# Patient Record
Sex: MALE | Race: WHITE | Employment: OTHER | URBAN - METROPOLITAN AREA
[De-identification: names, ages, dates, MRNs, and addresses within clinical notes are randomized per-mention and may not be internally consistent; named-entity substitution may affect disease eponyms.]

---

## 2017-01-02 ENCOUNTER — TELEPHONE (OUTPATIENT)
Dept: FAMILY MEDICINE | Facility: CLINIC | Age: 57
End: 2017-01-02

## 2017-01-02 ENCOUNTER — ALLIED HEALTH/NURSE VISIT (OUTPATIENT)
Dept: FAMILY MEDICINE | Facility: CLINIC | Age: 57
End: 2017-01-02
Payer: COMMERCIAL

## 2017-01-02 VITALS — DIASTOLIC BLOOD PRESSURE: 108 MMHG | SYSTOLIC BLOOD PRESSURE: 172 MMHG

## 2017-01-02 DIAGNOSIS — I10 HYPERTENSION GOAL BP (BLOOD PRESSURE) < 140/90: Primary | ICD-10-CM

## 2017-01-02 DIAGNOSIS — I10 ESSENTIAL HYPERTENSION WITH GOAL BLOOD PRESSURE LESS THAN 140/90: Primary | ICD-10-CM

## 2017-01-02 PROCEDURE — 99207 ZZC NO CHARGE NURSE ONLY: CPT | Performed by: FAMILY MEDICINE

## 2017-01-02 RX ORDER — LISINOPRIL 20 MG/1
40 TABLET ORAL DAILY
Qty: 60 TABLET | Refills: 0 | Status: SHIPPED | OUTPATIENT
Start: 2017-01-02 | End: 2017-01-09

## 2017-01-02 NOTE — TELEPHONE ENCOUNTER
Pt was told from the pharmacy he needed to be worked in today     Huddled with MD ANNA - bp 182/108 and he needs to double the lisinopril and be seen next Monday   BP Readings from Last 3 Encounters:   02/26/16 164/90   10/02/15 158/86   10/01/15 162/98       Advised pt on the above     Patient stated an understanding and agreed with plan.    Joanna Romero RN, BSN  [facility]

## 2017-01-02 NOTE — PROGRESS NOTES
"Estevan Aaron is enrolled/participating in the retail pharmacy Blood Pressure Goals Achievement Program (BPGAP).  Estevan Aaron was evaluated at [facility] on January 2, 2017 at which time his blood pressure was:    BP Readings from Last 3 Encounters:   01/02/17 172/108   02/26/16 164/90   10/02/15 158/86     Reviewed lifestyle modifications for blood pressure control and reduction: including making healthy food choices, managing weight, getting regular exercise, smoking cessation, reducing alcohol consumption, monitoring blood pressure regularly.     Estevan Aaron is not experiencing symptoms.    Follow-Up: BP is not at goal of < 140/90mmHg (patient 18+ years of age with or without diabetes), Recommended follow-up with PCP.  Routing to PCP for further review.    Completed by: Thank you,  Laila Steel [facility], Mgr [facility] [place] [phone]    NOTE: pt had been sitting x10 min.  Consistent with previous bp checks.  Pt reluctant to have office visit because he reports \"feeling fine\"  Also reports quite a bit of caffeine use and nicotine this morning.    RN increased lisinopril dose while pt was here and scheduled appt for 1/9/17     "

## 2017-01-06 ENCOUNTER — ALLIED HEALTH/NURSE VISIT (OUTPATIENT)
Dept: FAMILY MEDICINE | Facility: CLINIC | Age: 57
End: 2017-01-06
Payer: COMMERCIAL

## 2017-01-06 VITALS — DIASTOLIC BLOOD PRESSURE: 88 MMHG | SYSTOLIC BLOOD PRESSURE: 158 MMHG

## 2017-01-06 DIAGNOSIS — I10 ESSENTIAL HYPERTENSION WITH GOAL BLOOD PRESSURE LESS THAN 140/90: Primary | ICD-10-CM

## 2017-01-06 PROCEDURE — 99207 ZZC NO CHARGE NURSE ONLY: CPT | Performed by: FAMILY MEDICINE

## 2017-01-06 NOTE — PROGRESS NOTES
Estevan Aaron is enrolled/participating in the retail pharmacy Blood Pressure Goals Achievement Program (BPGAP).  Estevan Aaron was evaluated at [facility] on January 6, 2017 at which time his blood pressure was:    BP Readings from Last 3 Encounters:   01/06/17 158/88   01/02/17 172/108   02/26/16 164/90     Reviewed lifestyle modifications for blood pressure control and reduction: including making healthy food choices, managing weight, getting regular exercise, smoking cessation, reducing alcohol consumption, monitoring blood pressure regularly.     Estevan Aaron is not  experiencing symtoms: ()    Follow-Up: BP is not at goal of < 140/90mmHg (patient 18+ years of age with or without diabetes), Recommended follow-up with PCP.  Routing to PCP for further review.    Completed by: Thank you,  Laila Steel [facility], [facility] [place] [phone]    Pt has appt to be seen for htn 1/9/17.  NOTE: lisinopril was doubled (to 40 mg) daily about 5 days ago.  BP has come down but still not near goal.  Thank you,  Laila Steel [facility], [facility] [place] [phone]

## 2017-01-09 ENCOUNTER — OFFICE VISIT (OUTPATIENT)
Dept: FAMILY MEDICINE | Facility: CLINIC | Age: 57
End: 2017-01-09
Payer: COMMERCIAL

## 2017-01-09 VITALS
DIASTOLIC BLOOD PRESSURE: 86 MMHG | WEIGHT: 224 LBS | HEIGHT: 68 IN | HEART RATE: 105 BPM | SYSTOLIC BLOOD PRESSURE: 138 MMHG | OXYGEN SATURATION: 97 % | BODY MASS INDEX: 33.95 KG/M2 | TEMPERATURE: 98.1 F

## 2017-01-09 DIAGNOSIS — F17.200 TOBACCO USE DISORDER: ICD-10-CM

## 2017-01-09 DIAGNOSIS — I10 ESSENTIAL HYPERTENSION WITH GOAL BLOOD PRESSURE LESS THAN 140/90: Primary | ICD-10-CM

## 2017-01-09 DIAGNOSIS — Z12.5 SPECIAL SCREENING FOR MALIGNANT NEOPLASM OF PROSTATE: ICD-10-CM

## 2017-01-09 DIAGNOSIS — Z13.220 LIPID SCREENING: ICD-10-CM

## 2017-01-09 DIAGNOSIS — Z11.59 NEED FOR HEPATITIS C SCREENING TEST: ICD-10-CM

## 2017-01-09 DIAGNOSIS — E83.52 SERUM CALCIUM ELEVATED: ICD-10-CM

## 2017-01-09 PROCEDURE — 82043 UR ALBUMIN QUANTITATIVE: CPT | Performed by: PHYSICIAN ASSISTANT

## 2017-01-09 PROCEDURE — 99214 OFFICE O/P EST MOD 30 MIN: CPT | Performed by: PHYSICIAN ASSISTANT

## 2017-01-09 RX ORDER — TRIAMTERENE AND HYDROCHLOROTHIAZIDE 37.5; 25 MG/1; MG/1
1 CAPSULE ORAL DAILY
Qty: 90 CAPSULE | Refills: 0 | Status: SHIPPED | OUTPATIENT
Start: 2017-01-09 | End: 2017-01-17

## 2017-01-09 RX ORDER — LISINOPRIL 20 MG/1
20 TABLET ORAL DAILY
Qty: 90 TABLET | Refills: 0 | Status: SHIPPED | OUTPATIENT
Start: 2017-01-09 | End: 2017-01-17

## 2017-01-09 NOTE — NURSING NOTE
"Chief Complaint   Patient presents with     Recheck Medication     lisinopril        Initial /88 mmHg  Pulse 105  Temp(Src) 98.1  F (36.7  C) (Tympanic)  Ht 5' 8\" (1.727 m)  Wt 224 lb (101.606 kg)  BMI 34.07 kg/m2  SpO2 97% Estimated body mass index is 34.07 kg/(m^2) as calculated from the following:    Height as of this encounter: 5' 8\" (1.727 m).    Weight as of this encounter: 224 lb (101.606 kg).  BP completed using cuff size: opal Calle CMA      "

## 2017-01-09 NOTE — MR AVS SNAPSHOT
After Visit Summary   1/9/2017    Estevan Aaron    MRN: [id number]           Patient Information     Date Of Birth          1960        Visit Information        Provider Department      1/9/2017 5:20 PM Flora Gordillo PA-C [facility]        Today's Diagnoses     Essential hypertension with goal blood pressure less than 140/90    -  1     Serum calcium elevated         Special screening for malignant neoplasm of prostate         Need for hepatitis C screening test         Lipid screening         Tobacco use disorder           Care Instructions    Pharmacy BP follow up in 1 week.    Lab only appointment (fasting) in 4 weeks.        Follow-ups after your visit        Future tests that were ordered for you today     Open Future Orders        Priority Expected Expires Ordered    Basic metabolic panel Routine 2/8/2017 3/10/2017 1/9/2017    Lipid panel reflex to direct LDL Routine 2/8/2017 3/10/2017 1/9/2017            Who to contact     If you have questions or need follow up information about today's clinic visit or your schedule please contact [facility] directly at [phone].  Normal or non-critical lab and imaging results will be communicated to you by B[employer] P[employer] Gameshart, letter or phone within 4 business days after the clinic has received the results. If you do not hear from us within 7 days, please contact the clinic through [employer] or phone. If you have a critical or abnormal lab result, we will notify you by phone as soon as possible.  Submit refill requests through [employer] or call your pharmacy and they will forward the refill request to us. Please allow 3 business days for your refill to be completed.          Additional Information About Your Visit        [employer] P[employer] Information     [employer] lets you send messages to your doctor, view your test results, renew your prescriptions, schedule appointments and more. To sign up, go to [email or web address] . Click on  "\"Log in\" on the left side of the screen, which will take you to the Welcome page. Then click on \"Sign up Now\" on the right side of the page.     You will be asked to enter the access code listed below, as well as some personal information. Please follow the directions to create your username and password.     Your access code is: [email or web address]  Expires: 2017  5:55 PM     Your access code will  in 90 days. If you need help or a new code, please call your [facility] clinic or [phone].        Care EveryWhere ID     This is your Care EveryWhere ID. This could be used by other organizations to access your [facility] medical records  [id number]        Your Vitals Were     Pulse Temperature Height BMI (Body Mass Index) Pulse Oximetry       105 98.1  F (36.7  C) (Tympanic) 5' 8\" (1.727 m) 34.07 kg/m2 97%        Blood Pressure from Last 3 Encounters:   17 140/88   17 158/88   17 172/108    Weight from Last 3 Encounters:   17 224 lb (101.606 kg)   16 225 lb 3.2 oz (102.15 kg)   10/02/15 225 lb (102.059 kg)              We Performed the Following     Albumin Random Urine Quantitative     Calcium ionized     Comprehensive metabolic panel (BMP + Alb, Alk Phos, ALT, AST, Total. Bili, TP)     Hepatitis C Screen Reflex to HCV RNA Quant and Genotype     Parathyroid Hormone Intact     PSA, screen     Vitamin D Deficiency          Today's Medication Changes          These changes are accurate as of: 17  5:56 PM.  If you have any questions, ask your nurse or doctor.               Start taking these medicines.        Dose/Directions    triamterene-hydrochlorothiazide 37.5-25 MG per capsule   Commonly known as:  DYAZIDE   Used for:  Essential hypertension with goal blood pressure less than 140/90   Started by:  Flora Gordillo PA-C        Dose:  1 capsule   Take 1 capsule by mouth daily   Quantity:  90 capsule   Refills:  0         These medicines have changed or have updated " prescriptions.        Dose/Directions    lisinopril 20 MG tablet   Commonly known as:  PRINIVIL/ZESTRIL   This may have changed:  how much to take   Changed by:  Flora Gordillo PA-C        Dose:  20 mg   Take 1 tablet (20 mg) by mouth daily   Quantity:  90 tablet   Refills:  0            Where to get your medicines      These medications were sent to [facility] [address] [facility]  [address] [place] [address], [facility] [address]     Phone:  [phone]    - lisinopril 20 MG tablet  - triamterene-hydrochlorothiazide 37.5-25 MG per capsule             Primary Care Provider Office Phone # Fax #    Estevan Blair [phone]       [address] [address]        Thank you!     Thank you for choosing [facility]  for your care. Our goal is always to provide you with excellent care. Hearing back from our patients is one way we can continue to improve our services. Please take a few minutes to complete the written survey that you may receive in the mail after your visit with us. Thank you!             Your Updated Medication List - Protect others around you: Learn how to safely use, store and throw away your medicines at www.disposemymeds.org.          This list is accurate as of: 1/9/17  5:56 PM.  Always use your most recent med list.                   Brand Name Dispense Instructions for use    lisinopril 20 MG tablet    PRINIVIL/ZESTRIL    90 tablet    Take 1 tablet (20 mg) by mouth daily       triamterene-hydrochlorothiazide 37.5-25 MG per capsule    DYAZIDE    90 capsule    Take 1 capsule by mouth daily

## 2017-01-09 NOTE — PROGRESS NOTES
SUBJECTIVE:                                                    Estevan Aaron is a 56 year old male who presents to clinic today for the following health issues:    Hypertension Follow-up  Patient presents to clinic today for hypertension and medication follow up. He reports that he is aware he has high BP and attributes high BP to various life stressors. 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 He is interested in other medication options if it will help better control his BP. Currently taking 20 mg lisinopril daily as prescribed. Denies side effects from medication.   Family history in paternal grandfather and father significant for hypertension.     Outpatient blood pressures are being checked at home and pharmacy.  Results are variable.    Low Salt Diet: not monitoring salt  BP Readings from Last 6 Encounters:   01/09/17 140/88   01/06/17 158/88   01/02/17 172/108   02/26/16 164/90   10/02/15 158/86   10/01/15 162/98        Of mention, patient has PMH significant for elevated calcium levels. He has never followed up on his calcium labs. He admits to history significant for kidney stone which he was able to pass on his own.      Problem list and histories reviewed & adjusted, as indicated.  Additional history: as documented    Patient Active Problem List   Diagnosis     Tobacco use disorder     CARDIOVASCULAR SCREENING; LDL GOAL LESS THAN 160     Headache     Essential hypertension with goal blood pressure less than 140/90     Past Surgical History   Procedure Laterality Date     No history of surgery         Social History   Substance Use Topics     Smoking status: Current Every Day Smoker     Smokeless tobacco: Never Used      Comment: has cut down a lot - is very interested in [employer] quit program- referral given 4/19/06     Alcohol Use: No     Family History   Problem Relation Age of Onset     Prostate  "Cancer Father       age 59 cancer prostate, liver     CANCER Maternal Grandmother       of cancer     CEREBROVASCULAR DISEASE Maternal Grandfather      Hypertension Father          Current Outpatient Prescriptions   Medication Sig Dispense Refill     lisinopril (PRINIVIL/ZESTRIL) 20 MG tablet Take 2 tablets (40 mg) by mouth daily 60 tablet 0     No Known Allergies    ROS:  Constitutional, HEENT, cardiovascular, pulmonary, GI, , musculoskeletal, neuro, skin, endocrine and psych systems are negative, except as otherwise noted.    This document serves as a record of the services and decisions personally performed and made by Flora Gordillo PA-C. It was created on her behalf by Tonia Rubi, a trained medical scribe. The creation of this document is based the provider's statements to the medical scribe.  Tonia Rubi, 2017 5:42 PM    OBJECTIVE:                                                    /86 mmHg  Pulse 105  Temp(Src) 98.1  F (36.7  C) (Tympanic)  Ht 5' 8\" (1.727 m)  Wt 224 lb (101.606 kg)  BMI 34.07 kg/m2  SpO2 97%  Body mass index is 34.07 kg/(m^2).     GENERAL: healthy, alert and no distress  RESP: lungs clear to auscultation - no rales, rhonchi or wheezes  CV: regular rate and rhythm, normal S1 S2, no S3 or S4, no murmur, click or rub, no peripheral edema and peripheral pulses strong  NEURO: Normal strength and tone, mentation intact and speech normal  PSYCH: mentation appears normal, affect normal/bright    Diagnostic Test Results:  No results found for this or any previous visit (from the past 24 hour(s)).     ASSESSMENT/PLAN:                                                    Estevan was seen today for recheck medication.    Diagnoses and all orders for this visit:    Essential hypertension with goal blood pressure less than 140/90  Discussed with patient importance of a healthy diet, weight loss and smoking cessation. BP barely meeting goals.  Decrease lisinopril from 40 mg " to 20 mg and add triamterene-hydrochlorothiazide daily . RTC in ~1 week for pharamacy BP recheck. RTC in 4 weeks for lab-only appointment to do fasting labs.   -     Comprehensive metabolic panel (BMP + Alb, Alk Phos, ALT, AST, Total. Bili, TP)  -     Albumin Random Urine Quantitative  -     triamterene-hydrochlorothiazide (DYAZIDE) 37.5-25 MG per capsule; Take 1 capsule by mouth daily    Serum calcium elevated - will follow up pending lab results   -     Vitamin D Deficiency  -     Comprehensive metabolic panel (BMP + Alb, Alk Phos, ALT, AST, Total. Bili, TP)  -     Calcium ionized  -     Parathyroid Hormone Intact    Special screening for malignant neoplasm of prostate  -     PSA, screen    Need for hepatitis C screening test  -     Hepatitis C Screen Reflex to HCV RNA Quant and Genotype    The information in this document, created by the medical scribe for me, accurately reflects the services I personally performed and the decisions made by me. I have reviewed and approved this document for accuracy prior to leaving the patient care area.  Flora Gordillo PA-C January 9, 2017 5:42 PM    Flora Gordillo PA-C  [facility]

## 2017-01-11 LAB
CREAT UR-MCNC: 69 MG/DL
MICROALBUMIN UR-MCNC: 8 MG/L
MICROALBUMIN/CREAT UR: 11.92 MG/G CR (ref 0–17)

## 2017-01-17 ENCOUNTER — ALLIED HEALTH/NURSE VISIT (OUTPATIENT)
Dept: FAMILY MEDICINE | Facility: CLINIC | Age: 57
End: 2017-01-17
Payer: COMMERCIAL

## 2017-01-17 VITALS — DIASTOLIC BLOOD PRESSURE: 78 MMHG | SYSTOLIC BLOOD PRESSURE: 130 MMHG

## 2017-01-17 DIAGNOSIS — I10 ESSENTIAL HYPERTENSION WITH GOAL BLOOD PRESSURE LESS THAN 140/90: Primary | ICD-10-CM

## 2017-01-17 PROCEDURE — 99207 ZZC NO CHARGE NURSE ONLY: CPT | Performed by: FAMILY MEDICINE

## 2017-01-17 RX ORDER — TRIAMTERENE AND HYDROCHLOROTHIAZIDE 37.5; 25 MG/1; MG/1
1 CAPSULE ORAL DAILY
Qty: 90 CAPSULE | Refills: 1 | Status: SHIPPED | OUTPATIENT
Start: 2017-01-17 | End: 2017-09-08

## 2017-01-17 RX ORDER — LISINOPRIL 20 MG/1
20 TABLET ORAL DAILY
Qty: 90 TABLET | Refills: 1 | Status: SHIPPED | OUTPATIENT
Start: 2017-01-17 | End: 2017-10-18

## 2017-01-17 NOTE — PROGRESS NOTES
Great news.  Please ensure pt gets labs done (fasting) and follow up in 3 months.      Flora Gordillo, MS, PA-C

## 2017-01-17 NOTE — PROGRESS NOTES
Estevan Aaron is enrolled/participating in the retail pharmacy Blood Pressure Goals Achievement Program (BPGAP).  Estevan Aaron was evaluated at [facility] on January 17, 2017 at which time his blood pressure was:    BP Readings from Last 3 Encounters:   01/17/17 130/78   01/09/17 138/86   01/06/17 158/88     Reviewed lifestyle modifications for blood pressure control and reduction: including making healthy food choices, managing weight, getting regular exercise, smoking cessation, reducing alcohol consumption, monitoring blood pressure regularly.     Estevan Aaron is not experiencing symptoms.    Follow-Up: BP is at goal of < 140/90mmHg (patient 18+ years of age with or without diabetes).  Recommended follow-up at pharmacy in 6 months.     Completed by: Thank you,  Laila Steel RPh,  [facility] [place] [phone]

## 2017-03-14 ENCOUNTER — ALLIED HEALTH/NURSE VISIT (OUTPATIENT)
Dept: FAMILY MEDICINE | Facility: CLINIC | Age: 57
End: 2017-03-14
Payer: COMMERCIAL

## 2017-03-14 VITALS — DIASTOLIC BLOOD PRESSURE: 82 MMHG | SYSTOLIC BLOOD PRESSURE: 140 MMHG

## 2017-03-14 DIAGNOSIS — I10 ESSENTIAL HYPERTENSION WITH GOAL BLOOD PRESSURE LESS THAN 140/90: Primary | ICD-10-CM

## 2017-03-14 PROCEDURE — 99207 ZZC NO CHARGE NURSE ONLY: CPT | Performed by: FAMILY MEDICINE

## 2017-03-14 NOTE — MR AVS SNAPSHOT
"              After Visit Summary   3/14/2017    Estevan Aaron    MRN: [id number]           Patient Information     Date Of Birth          1960        Visit Information        Provider Department      3/14/2017 5:18 PM Estevan Blair MD [facility]        Today's Diagnoses     Essential hypertension with goal blood pressure less than 140/90    -  1       Follow-ups after your visit        Who to contact     If you have questions or need follow up information about today's clinic visit or your schedule please contact [facility] directly at [phone].  Normal or non-critical lab and imaging results will be communicated to you by myBestHelperhart, letter or phone within 4 business days after the clinic has received the results. If you do not hear from us within 7 days, please contact the clinic through WorldPassKeyt or phone. If you have a critical or abnormal lab result, we will notify you by phone as soon as possible.  Submit refill requests through [employer] or call your pharmacy and they will forward the refill request to us. Please allow 3 business days for your refill to be completed.          Additional Information About Your Visit        MyChart Information     [employer] lets you send messages to your doctor, view your test results, renew your prescriptions, schedule appointments and more. To sign up, go to [email or web address] . Click on \"Log in\" on the left side of the screen, which will take you to the Welcome page. Then click on \"Sign up Now\" on the right side of the page.     You will be asked to enter the access code listed below, as well as some personal information. Please follow the directions to create your username and password.     Your access code is: [email or web address]  Expires: 2017  6:55 PM     Your access code will  in 90 days. If you need help or a new code, please call your [facility] clinic or [phone].        Care EveryWhere ID     This is your Care " EveryWhere ID. This could be used by other organizations to access your [facility] medical records  [id number]         Blood Pressure from Last 3 Encounters:   03/14/17 140/82   01/17/17 130/78   01/09/17 138/86    Weight from Last 3 Encounters:   01/09/17 224 lb (101.6 kg)   02/26/16 225 lb 3.2 oz (102.2 kg)   10/02/15 225 lb (102.1 kg)              Today, you had the following     No orders found for display       Primary Care Provider Office Phone # Fax #    Estevan Blair [phone]       [facility] [address] [place] [id number]        Thank you!     Thank you for choosing [facility]  for your care. Our goal is always to provide you with excellent care. Hearing back from our patients is one way we can continue to improve our services. Please take a few minutes to complete the written survey that you may receive in the mail after your visit with us. Thank you!             Your Updated Medication List - Protect others around you: Learn how to safely use, store and throw away your medicines at www.disposemymeds.org.          This list is accurate as of: 3/14/17  5:42 PM.  Always use your most recent med list.                   Brand Name Dispense Instructions for use    lisinopril 20 MG tablet    PRINIVIL/ZESTRIL    90 tablet    Take 1 tablet (20 mg) by mouth daily       triamterene-hydrochlorothiazide 37.5-25 MG per capsule    DYAZIDE    90 capsule    Take 1 capsule by mouth daily

## 2017-03-14 NOTE — PROGRESS NOTES
Estevan Aaron is enrolled/participating in the retail pharmacy Blood Pressure Goals Achievement Program (BPGAP).  Estevan Aaron was evaluated at [facility] on March 14, 2017 at which time his blood pressure was:    BP Readings from Last 3 Encounters:   03/14/17 140/82   01/17/17 130/78   01/09/17 138/86       Discussed contributing factors to BP being a bit high in comparison to last measurement (had smoked 15 minutes earlier, had substantial coffee for the day, was upset about vehicle problems)    Estevan Aaron is not experiencing symptoms.    Follow-Up: BP is at goal of < 140/90mmHg (patient 18+ years of age with or without diabetes).  Recommended follow-up at pharmacy in 6 months.     Completed by:     Kalyan Ng [facility] Pharmacist on behalf of: [facility]

## 2017-06-26 ENCOUNTER — TELEPHONE (OUTPATIENT)
Dept: FAMILY MEDICINE | Facility: CLINIC | Age: 57
End: 2017-06-26

## 2017-06-26 NOTE — LETTER
[facility]  [address] [place] [address]  [place] [address]  [phone]  June 26, 2017    Estevan T Page  [address] [place] [address]    Dear Santos,    I care about your health and have reviewed your health plan. I have reviewed your medical conditions, medication list, and lab results and am making recommendations based on this review, to better manage your health.    You are in particular need of attention regarding:  -High Blood Pressure    I am recommending that you:  -schedule a NURSE-ONLY BLOOD PRESSURE APPOINTMENT within the next 1-4 weeks.  Our [facility] has walk-in blood pressure program.      Here is a list of Health Maintenance topics that are due now or due soon:  Health Maintenance Due   Topic Date Due     Wellness Visit with your Primary Provider - yearly  1960     Discuss Advance Directive Planning  04/22/1977     Hepatitis C Screening  04/22/1977     Cholesterol Lab - every 5 years  04/22/1994     Colon Cancer Screening - every 10 years.  04/22/2009     Basic Metabolic Lab - yearly  10/02/2016     Prostate Test (PSA) - yearly  10/02/2016     Please call us at [phone] (or use Zady) to address the above recommendations.                     Thank you for trusting [facility] and we appreciate the opportunity to serve you.  We look forward to supporting your healthcare needs in the future.    Healthy Regards,      Flora Gordillo PA-C

## 2017-06-26 NOTE — TELEPHONE ENCOUNTER
Please contact pt and have him do BP recheck with pharmacy when he is able.      Flora Gordillo, MS, PA-C

## 2017-07-11 ENCOUNTER — ALLIED HEALTH/NURSE VISIT (OUTPATIENT)
Dept: FAMILY MEDICINE | Facility: CLINIC | Age: 57
End: 2017-07-11
Payer: COMMERCIAL

## 2017-07-11 VITALS — DIASTOLIC BLOOD PRESSURE: 66 MMHG | SYSTOLIC BLOOD PRESSURE: 136 MMHG

## 2017-07-11 DIAGNOSIS — I10 ESSENTIAL HYPERTENSION WITH GOAL BLOOD PRESSURE LESS THAN 140/90: Primary | ICD-10-CM

## 2017-07-11 PROCEDURE — 99207 ZZC NO CHARGE NURSE ONLY: CPT | Performed by: FAMILY MEDICINE

## 2017-07-11 NOTE — MR AVS SNAPSHOT
"              After Visit Summary   2017    Estevan Aaron    MRN: [id number]           Patient Information     Date Of Birth          1960        Visit Information        Provider Department      2017 2:39 PM Estevan Blair MD [facility]        Today's Diagnoses     Essential hypertension with goal blood pressure less than 140/90    -  1       Follow-ups after your visit        Who to contact     If you have questions or need follow up information about today's clinic visit or your schedule please contact [facility] directly at [phone].  Normal or non-critical lab and imaging results will be communicated to you by [employer], letter or phone within 4 business days after the clinic has received the results. If you do not hear from us within 7 days, please contact the clinic through [employer] or phone. If you have a critical or abnormal lab result, we will notify you by phone as soon as possible.  Submit refill requests through [employer] or call your pharmacy and they will forward the refill request to us. Please allow 3 business days for your refill to be completed.          Additional Information About Your Visit        MyChart Information     [employer] lets you send messages to your doctor, view your test results, renew your prescriptions, schedule appointments and more. To sign up, go to [email or web address] . Click on \"Log in\" on the left side of the screen, which will take you to the Welcome page. Then click on \"Sign up Now\" on the right side of the page.     You will be asked to enter the access code listed below, as well as some personal information. Please follow the directions to create your username and password.     Your access code is: [email or web address]  Expires: 10/9/2017  2:41 PM     Your access code will  in 90 days. If you need help or a new code, please call your [facility] or [phone].        Care EveryWhere ID     This is your Care " EveryWhere ID. This could be used by other organizations to access your [facility] medical records  [id number]         Blood Pressure from Last 3 Encounters:   07/11/17 136/66   03/14/17 140/82   01/17/17 130/78    Weight from Last 3 Encounters:   01/09/17 224 lb (101.6 kg)   02/26/16 225 lb 3.2 oz (102.2 kg)   10/02/15 225 lb (102.1 kg)              Today, you had the following     No orders found for display       Primary Care Provider Office Phone # Fax #    Estevan Blair [phone]       [facility] [address] [facility] [address]        Equal Access to Services     MASSIMO MORRELL : Hadii aad ku hadasho Sooswaldoali, waaxda luqadaha, qaybta kaalmada adeegyada, waxay idiin haychristine kern. So [facility] [phone].    ATENCIÓN: Si habla español, tiene a jimenes disposición servicios gratuitos de asistencia lingüística. [facility] [phone].    We comply with applicable federal civil rights laws and [place] laws. We do not discriminate on the basis of race, color, national origin, age, disability sex, sexual orientation or gender identity.            Thank you!     Thank you for choosing [facility]  for your care. Our goal is always to provide you with excellent care. Hearing back from our patients is one way we can continue to improve our services. Please take a few minutes to complete the written survey that you may receive in the mail after your visit with us. Thank you!             Your Updated Medication List - Protect others around you: Learn how to safely use, store and throw away your medicines at www.disposemymeds.org.          This list is accurate as of: 7/11/17  2:41 PM.  Always use your most recent med list.                   Brand Name Dispense Instructions for use Diagnosis    lisinopril 20 MG tablet    PRINIVIL/ZESTRIL    90 tablet    Take 1 tablet (20 mg) by mouth daily    Essential hypertension with goal blood pressure less than  140/90       triamterene-hydrochlorothiazide 37.5-25 MG per capsule    DYAZIDE    90 capsule    Take 1 capsule by mouth daily    Essential hypertension with goal blood pressure less than 140/90

## 2017-07-11 NOTE — NURSING NOTE
Estevan Aaron is enrolled/participating in the retail pharmacy Blood Pressure Goals Achievement Program (BPGAP).  Estevan Aaron was evaluated at [facility] on July 11, 2017 at which time his blood pressure was:    BP Readings from Last 3 Encounters:   07/11/17 136/66   03/14/17 140/82   01/17/17 130/78     Reviewed lifestyle modifications for blood pressure control and reduction: including making healthy food choices, managing weight, getting regular exercise, smoking cessation, reducing alcohol consumption, monitoring blood pressure regularly.     Estevan Aaron is not experiencing symptoms.    Follow-Up: BP is at goal of < 140/90mmHg (patient 18+ years of age with or without diabetes).  Recommended follow-up at pharmacy in 6 months.     Recommendation to Provider: Continue current meds.  Pt ran out of 1 med, and will restart and recheck bp in 1 month     Estevan Aaron was evaluated for enrollment into the PGEN study today.    Patient eligible for enrollment:  No  Patient interested in enrollment:  No    Completed by: Thank you,  Laila Steel [facility], Mgr [facility] [phone]

## 2017-09-08 DIAGNOSIS — I10 ESSENTIAL HYPERTENSION WITH GOAL BLOOD PRESSURE LESS THAN 140/90: ICD-10-CM

## 2017-09-08 NOTE — LETTER
"September 27, 2017      Estevan PRABHAKAR Page  [address] [place] [address]        Dear Estevan,       Your triamterene-hydrochlorothiazide (DYAZIDE) 37.5-25 MG per capsule was refilled for 30 days  You are due to be seen for a \"Lab Only\" appointment for your potassium and creatinine prior to your next refill   Please contact the clinic and allow enough time to schedule prior to your next refill need.  [phone]      Galilea  for :  Dr. Kelli Marino, MPH, PA-C  Dr. Sunil Doherty                "

## 2017-09-08 NOTE — TELEPHONE ENCOUNTER
Dyazide      /Last Written Prescription Date: 1/17/17  Last Fill Quantity: 90, # refills: 1  Last Office Visit with [facility], [facility] or [facility] prescribing provider: 1/9/17       Potassium   Date Value Ref Range Status   10/02/2015 4.8 3.4 - 5.3 mmol/L Final     Creatinine   Date Value Ref Range Status   10/02/2015 0.95 0.66 - 1.25 mg/dL Final     BP Readings from Last 3 Encounters:   07/11/17 136/66   03/14/17 140/82   01/17/17 130/78     Ragini Lopez [facility]

## 2017-09-11 RX ORDER — TRIAMTERENE AND HYDROCHLOROTHIAZIDE 37.5; 25 MG/1; MG/1
CAPSULE ORAL
Qty: 30 CAPSULE | Refills: 0 | Status: SHIPPED | OUTPATIENT
Start: 2017-09-11 | End: 2017-10-18

## 2017-09-11 NOTE — TELEPHONE ENCOUNTER
Medication is being filled for 1 time refill only due tpotassium, creatinine Patient needs labs potassium, creatinine.   Vera Cat RN

## 2017-09-22 NOTE — TELEPHONE ENCOUNTER
left voicemail message for patient to contact Main Clinic Number to schedule.  NTBS: Patient needs labs BMP (does he want to schedule for [facility] or [facility])  Galilea Sol TC

## 2017-09-27 NOTE — TELEPHONE ENCOUNTER
left voicemail message for patient to contact main clinic to speak with TC  TC also mailed letter  Galilea TAYLOR

## 2017-10-18 DIAGNOSIS — I10 ESSENTIAL HYPERTENSION WITH GOAL BLOOD PRESSURE LESS THAN 140/90: ICD-10-CM

## 2017-10-18 NOTE — LETTER
[facility]  [address] [place] [address]  [place] [address]  [phone]  November 15, 2017    Estevan T Page  [address] [place] [address]    Dear Santos,    I care about your health and have reviewed your health plan. I have reviewed your medical conditions, medication list, and lab results and am making recommendations based on this review, to better manage your health.    You are in particular need of attention regarding:  -Wellness (Physical) Visit  before your next refill    I am recommending that you:  -schedule a WELLNESS (Physical) APPOINTMENT with me.   I will check fasting labs the same day - nothing to eat except water and meds for 8-10 hours prior.      Here is a list of Health Maintenance topics that are due now or due soon:  Health Maintenance Due   Topic Date Due     Wellness Visit with your Primary Provider - yearly  1960     Hepatitis C Screening  04/22/1977     Cholesterol Lab - every 5 years  04/22/1994     Colon Cancer Screening - every 10 years.  04/22/2009     Discuss Advance Directive Planning  04/22/2014     Basic Metabolic Lab - yearly  10/02/2016     Prostate Test (PSA) - yearly  10/02/2016     Flu Vaccine - yearly  09/01/2017       Please call us at [phone] (or use [employer]) to address the above recommendations.                 Thank you for trusting [facility] and we appreciate the opportunity to serve you.  We look forward to supporting your healthcare needs in the future.    Healthy Regards,            Chance Blair M.D.

## 2017-10-20 RX ORDER — LISINOPRIL 20 MG/1
TABLET ORAL
Qty: 3 TABLET | Refills: 0 | Status: SHIPPED | OUTPATIENT
Start: 2017-10-20 | End: 2017-12-11

## 2017-10-20 RX ORDER — TRIAMTERENE AND HYDROCHLOROTHIAZIDE 37.5; 25 MG/1; MG/1
CAPSULE ORAL
Qty: 30 CAPSULE | Refills: 0 | Status: SHIPPED | OUTPATIENT
Start: 2017-10-20 | End: 2017-12-11

## 2017-10-20 NOTE — TELEPHONE ENCOUNTER
Refill(s) for 1 month only.  Please call the patient and schedule a followup appointment within the next month.

## 2017-11-02 NOTE — TELEPHONE ENCOUNTER
Second attempt - Left non-detailed message for patient to call back.  (see message below)    Yany Albright

## 2017-11-15 NOTE — TELEPHONE ENCOUNTER
Third attempt - Left non-detailed message for patient to call back.  (see message below)  Letter sent.  Closing encounter.    Yany Albright

## 2017-12-11 DIAGNOSIS — I10 ESSENTIAL HYPERTENSION WITH GOAL BLOOD PRESSURE LESS THAN 140/90: ICD-10-CM

## 2017-12-11 NOTE — LETTER
[facility]  [address] [place] [address]  [place], [place] [address] [phone]  December 19, 2017    Estevan T Page  [address] [place] [address]    Dear Santos,    I care about your health and have reviewed your health plan. I have reviewed your medical conditions, medication list, and lab results and am making recommendations based on this review, to better manage your health.    You are in particular need of attention regarding:  -medication check    I am recommending that you:  -schedule a FOLLOWUP OFFICE APPOINTMENT with me.  Fill out PHQ-9 and send back in the envelope provided.        Here is a list of Health Maintenance topics that are due now or due soon:  Health Maintenance Due   Topic Date Due     Wellness Visit with your Primary Provider - yearly  1960     Hepatitis C Screening  04/22/1977     Cholesterol Lab - every 5 years  04/22/1994     Colon Cancer Screening - every 10 years.  04/22/2009     Discuss Advance Directive Planning  04/22/2014     Basic Metabolic Lab - yearly  10/02/2016     Prostate Test (PSA) - yearly  10/02/2016     Flu Vaccine - yearly  09/01/2017     Microalbumin Lab - yearly  01/09/2018       Please call us at [phone] (or use [employer]) to address the above recommendations.               Thank you for trusting [facility] and we appreciate the opportunity to serve you.  We look forward to supporting your healthcare needs in the future.    Healthy Regards,            Chance Blair M.D./rs

## 2017-12-12 NOTE — TELEPHONE ENCOUNTER
Requested Prescriptions   Pending Prescriptions Disp Refills     lisinopril (PRINIVIL/ZESTRIL) 20 MG tablet [Pharmacy Med Name: LISINOPRIL 20MG TABS]  Last Written Prescription Date:  10/20/2017  Last Fill Quantity: 3 tablet,  # refills: 0   Last Office Visit with [facility], [facility] or [facility] prescribing provider:  1/9/2017   Future Office Visit:      30 tablet 0     Sig: TAKE ONE TABLET BY MOUTH ONCE DAILY    ACE Inhibitors (Including Combos) Protocol Failed    12/11/2017  8:47 AM       Failed - Normal serum creatinine on file in past 12 months    Recent Labs   Lab Test  10/02/15   1055   CR  0.95          Failed - Normal serum potassium on file in past 12 months    Recent Labs   Lab Test  10/02/15   1055   POTASSIUM  4.8          Passed - Blood pressure under 140/90    BP Readings from Last 3 Encounters:   07/11/17 136/66   03/14/17 140/82   01/17/17 130/78          Passed - Recent or future visit with authorizing provider's specialty    Patient had office visit in the last year or has a visit in the next 30 days with authorizing provider.  See chart review.          Passed - Patient is age 18 or older              triamterene-hydrochlorothiazide (DYAZIDE) 37.5-25 MG per capsule [Pharmacy Med Name: TRIAMTERENE-HCTZ 37.5-25MG CAPS]  Last Written Prescription Date:  10/20/2017  Last Fill Quantity: 30 capsule,  # refills: 0   Last Office Visit with [facility], [facility] or [facility] prescribing provider:  1/9/2017   Future Office Visit:      30 capsule 0     Sig: TAKE ONE CAPSULE BY MOUTH EVERY DAY. DUE FOR LABS    Diuretics (Including Combos) Protocol Failed    12/11/2017  8:47 AM       Failed - Normal serum creatinine on file in past 12 months    Recent Labs   Lab Test  10/02/15   1055   CR  0.95           Failed - Normal serum potassium on file in past 12 months    Recent Labs   Lab Test  10/02/15   1055   POTASSIUM  4.8           Failed - Normal serum sodium on file in past 12 months    Recent Labs   Lab Test  10/02/15   1055   NA   139           Passed - Blood pressure under 140/90    BP Readings from Last 3 Encounters:   07/11/17 136/66   03/14/17 140/82   01/17/17 130/78          Passed - Recent or future visit with authorizing provider's specialty    Patient had office visit in the last year or has a visit in the next 30 days with authorizing provider.  See chart review.          Passed - Patient is age 18 or older

## 2017-12-13 NOTE — TELEPHONE ENCOUNTER
Routing refill request to provider for review/approval because:  Failed protocol.      Rebeca Beltran, BS, RN, PHN  [facility]) [phone]

## 2017-12-14 RX ORDER — LISINOPRIL 20 MG/1
TABLET ORAL
Qty: 30 TABLET | Refills: 0 | Status: SHIPPED | OUTPATIENT
Start: 2017-12-14 | End: 2018-01-05

## 2017-12-14 RX ORDER — TRIAMTERENE AND HYDROCHLOROTHIAZIDE 37.5; 25 MG/1; MG/1
CAPSULE ORAL
Qty: 30 CAPSULE | Refills: 0 | Status: SHIPPED | OUTPATIENT
Start: 2017-12-14 | End: 2018-01-05

## 2017-12-14 NOTE — TELEPHONE ENCOUNTER
Left non-detailed message for patient to call back.  Please schedule follow up when patient calls back.  (see previous notes for details)    Yany Albright

## 2017-12-19 NOTE — TELEPHONE ENCOUNTER
Second attempt - Left non-detailed message for patient to call back.  Please schedule follow up when patient calls back.  (see previous notes for details)   Letter sent.  Closing encounter    Yany Albright

## 2018-01-04 NOTE — PROGRESS NOTES
"  SUBJECTIVE:                                                    Estevan Aaron is a 57 year old male who presents to clinic today for the following health issues:    Hypertension Follow-up      Outpatient blood pressures are not being checked.    Low Salt Diet: low salt    Estevan is doing well on lisinopril 20 mg and Dyazide 37.5-25 mg. He states he experiences headaches if he does not take medications. Lower extremity edema has improved some with diuretic and compression socks to above the ankle.       Amount of exercise or physical activity: work active    Problems taking medications regularly: No    Medication side effects: none    Diet: low salt      Tobacco use -- He smokes 0.5 ppd, unless he has an alcoholic beverage then use increases. He states he has reduced use overall. He is not ready to cease at this time. No sleep difficulties.     Generalized aches and pains -- Estevan experiences generalized aches and pains, especially arthritis pain in hands. Secondary to job as a . He relates his skin is cracking at work despite wearing protective gloves.     Problem list and histories reviewed & adjusted, as indicated.  Additional history: as documented    ROS:  Constitutional, HEENT, cardiovascular, pulmonary, GI, , musculoskeletal, neuro, skin, endocrine and psych systems are negative, except as otherwise noted.    This document serves as a record of the services and decisions personally performed and made by Estevan Blair MD. It was created on his behalf by Charleen Ritter, a trained medical scribe. The creation of this document is based on the provider's statements to the medical scribe.  Charleen Ritter 7:52 AM January 5, 2018    OBJECTIVE:                                                    /80 (BP Location: Right arm, Patient Position: Sitting, Cuff Size: Adult Large)  Pulse 84  Temp 98.3  F (36.8  C) (Oral)  Ht 1.727 m (5' 8\")  Wt 97.5 kg (215 lb)  SpO2 96%  BMI 32.69 kg/m2 Body " mass index is 32.69 kg/(m^2).     GENERAL: healthy, alert, well nourished, well hydrated, no distress  HENT: ear canals- normal; TMs- normal; Nose- normal; Mouth- no ulcers, no lesions  NECK: no tenderness, no adenopathy, no asymmetry, no masses, no stiffness; thyroid- normal to palpation  RESP: lungs clear to auscultation - no rales, no rhonchi, no wheezes  CV: regular rates and rhythm, normal S1 S2, no S3 or S4 and no murmur, no click or rub, no carotid bruits   ABDOMEN: soft, no tenderness, no  hepatosplenomegaly, no masses, normal bowel sounds  MS: extremities- no gross deformities noted, no edema  SKIN: varicose veins on left inner ankle, otherwise no suspicious lesions, no rashes      Diagnostic test results:  No results found for this or any previous visit (from the past 24 hour(s)).     ASSESSMENT/PLAN:         Estevan was seen today for recheck medication.    Diagnoses and all orders for this visit:    Essential hypertension with goal blood pressure less than 140/90  Controlled. Continue current meds. Updating labs today.   -     BASIC METABOLIC PANEL  -     Lipid panel reflex to direct LDL Fasting  -     Albumin Random Urine Quantitative with Creat Ratio  -     lisinopril (PRINIVIL/ZESTRIL) 20 MG tablet; Take 1 tablet (20 mg) by mouth daily  -     triamterene-hydrochlorothiazide (DYAZIDE) 37.5-25 MG per capsule; Take 1 capsule by mouth daily    Tobacco use disorder  Encouraged tobacco cessation, patient is not interested at this time.     Screen for colon cancer  -     GASTROENTEROLOGY ADULT REF PROCEDURE ONLY    Screening for prostate cancer  -     PROSTATE SPEC ANTIGEN SCREEN    Need for hepatitis C screening test  -     Hepatitis C Screen Reflex to HCV RNA Quant and Genotype    CARDIOVASCULAR SCREENING; LDL GOAL LESS THAN 160  Patient is fasting.   -     Lipid panel reflex to direct LDL Fasting    Risks, benefits and alternatives of treatments discussed. Plan agreed on.      Followup: 6-12 months      Will call, return to clinic, or go to ED if worsening or symptoms not improving as discussed.    See patient instructions.     Tobacco Cessation:   reports that he has been smoking.  He has been smoking about 0.50 packs per day. He has never used smokeless tobacco.  Tobacco Cessation Action Plan: Information offered: Patient not interested at this time    Declined immunizations: Influenza due to Conscientious objector    The information in this document, created by the medical scribe for me, accurately reflects the services I personally performed and the decisions made by me. I have reviewed and approved this document for accuracy prior to leaving the patient care area.  January 5, 2018 8:17 AM        Chance Blair MD   Pager: [phone]

## 2018-01-05 ENCOUNTER — OFFICE VISIT (OUTPATIENT)
Dept: FAMILY MEDICINE | Facility: CLINIC | Age: 58
End: 2018-01-05
Payer: COMMERCIAL

## 2018-01-05 VITALS
BODY MASS INDEX: 32.58 KG/M2 | HEIGHT: 68 IN | HEART RATE: 84 BPM | OXYGEN SATURATION: 96 % | SYSTOLIC BLOOD PRESSURE: 136 MMHG | DIASTOLIC BLOOD PRESSURE: 80 MMHG | TEMPERATURE: 98.3 F | WEIGHT: 215 LBS

## 2018-01-05 DIAGNOSIS — Z12.11 SCREEN FOR COLON CANCER: ICD-10-CM

## 2018-01-05 DIAGNOSIS — Z12.5 SCREENING FOR PROSTATE CANCER: ICD-10-CM

## 2018-01-05 DIAGNOSIS — E83.52 SERUM CALCIUM ELEVATED: ICD-10-CM

## 2018-01-05 DIAGNOSIS — F17.200 TOBACCO USE DISORDER: ICD-10-CM

## 2018-01-05 DIAGNOSIS — Z11.59 NEED FOR HEPATITIS C SCREENING TEST: ICD-10-CM

## 2018-01-05 DIAGNOSIS — E78.5 HYPERLIPIDEMIA LDL GOAL <130: ICD-10-CM

## 2018-01-05 DIAGNOSIS — I10 ESSENTIAL HYPERTENSION WITH GOAL BLOOD PRESSURE LESS THAN 140/90: Primary | ICD-10-CM

## 2018-01-05 LAB
ANION GAP SERPL CALCULATED.3IONS-SCNC: 9 MMOL/L (ref 3–14)
BUN SERPL-MCNC: 19 MG/DL (ref 7–30)
CALCIUM SERPL-MCNC: 11.2 MG/DL (ref 8.5–10.1)
CHLORIDE SERPL-SCNC: 104 MMOL/L (ref 94–109)
CHOLEST SERPL-MCNC: 273 MG/DL
CO2 SERPL-SCNC: 24 MMOL/L (ref 20–32)
CREAT SERPL-MCNC: 0.94 MG/DL (ref 0.66–1.25)
CREAT UR-MCNC: 24 MG/DL
GFR SERPL CREATININE-BSD FRML MDRD: 82 ML/MIN/1.7M2
GLUCOSE SERPL-MCNC: 93 MG/DL (ref 70–99)
HDLC SERPL-MCNC: 33 MG/DL
LDLC SERPL CALC-MCNC: 172 MG/DL
MICROALBUMIN UR-MCNC: <5 MG/L
MICROALBUMIN/CREAT UR: NORMAL MG/G CR (ref 0–17)
NONHDLC SERPL-MCNC: 240 MG/DL
POTASSIUM SERPL-SCNC: 4.2 MMOL/L (ref 3.4–5.3)
PSA SERPL-ACNC: 2.69 UG/L (ref 0–4)
SODIUM SERPL-SCNC: 137 MMOL/L (ref 133–144)
TRIGL SERPL-MCNC: 341 MG/DL

## 2018-01-05 PROCEDURE — 99214 OFFICE O/P EST MOD 30 MIN: CPT | Performed by: FAMILY MEDICINE

## 2018-01-05 PROCEDURE — G0103 PSA SCREENING: HCPCS | Performed by: FAMILY MEDICINE

## 2018-01-05 PROCEDURE — 36415 COLL VENOUS BLD VENIPUNCTURE: CPT | Performed by: FAMILY MEDICINE

## 2018-01-05 PROCEDURE — 80061 LIPID PANEL: CPT | Performed by: FAMILY MEDICINE

## 2018-01-05 PROCEDURE — 82043 UR ALBUMIN QUANTITATIVE: CPT | Performed by: FAMILY MEDICINE

## 2018-01-05 PROCEDURE — 80048 BASIC METABOLIC PNL TOTAL CA: CPT | Performed by: FAMILY MEDICINE

## 2018-01-05 PROCEDURE — 86803 HEPATITIS C AB TEST: CPT | Performed by: FAMILY MEDICINE

## 2018-01-05 RX ORDER — LISINOPRIL 20 MG/1
20 TABLET ORAL DAILY
Qty: 90 TABLET | Refills: 3 | Status: SHIPPED | OUTPATIENT
Start: 2018-01-05 | End: 2019-02-04

## 2018-01-05 RX ORDER — TRIAMTERENE AND HYDROCHLOROTHIAZIDE 37.5; 25 MG/1; MG/1
1 CAPSULE ORAL DAILY
Qty: 90 CAPSULE | Refills: 3 | Status: SHIPPED | OUTPATIENT
Start: 2018-01-05 | End: 2019-02-04

## 2018-01-05 NOTE — MR AVS SNAPSHOT
After Visit Summary   1/5/2018    Estevan Aaron    MRN: [id number]           Patient Information     Date Of Birth          1960        Visit Information        Provider Department      1/5/2018 7:40 AM Estevan Blair MD [facility]        Today's Diagnoses     Essential hypertension with goal blood pressure less than 140/90    -  1    Tobacco use disorder        Screen for colon cancer        Screening for prostate cancer        Need for hepatitis C screening test        CARDIOVASCULAR SCREENING; LDL GOAL LESS THAN 160           Follow-ups after your visit        Additional Services     GASTROENTEROLOGY ADULT REF PROCEDURE ONLY       Last Lab Result: Creatinine (mg/dL)       Date                     Value                 10/02/2015               0.95             ----------  Body mass index is 32.69 kg/(m^2).      Patient will be contacted to schedule procedure.     Please be aware that coverage of these services is subject to the terms and limitations of your health insurance plan.  Call member services at your health plan with any benefit or coverage questions.  Any procedures must be performed at a [facility] facility OR coordinated by your clinic's referral office.    Please bring the following with you to your appointment:    (1) Any X-Rays, CTs or MRIs which have been performed.  Contact the facility where they were done to arrange for  prior to your scheduled appointment.    (2) List of current medications   (3) This referral request   (4) Any documents/labs given to you for this referral                  Who to contact     If you have questions or need follow up information about today's clinic visit or your schedule please contact [facility] directly at [phone].  Normal or non-critical lab and imaging results will be communicated to you by MyChart, letter or phone within 4 business days after the clinic has received the results. If you do not  "hear from us within 7 days, please contact the clinic through [facility] or phone. If you have a critical or abnormal lab result, we will notify you by phone as soon as possible.  Submit refill requests through [facility] or call your pharmacy and they will forward the refill request to us. Please allow 3 business days for your refill to be completed.          Additional Information About Your Visit        Kuona Information     K[facility]ona lets you send messages to your doctor, view your test results, renew your prescriptions, schedule appointments and more. To sign up, go to [email or web address] . Click on \"Log in\" on the left side of the screen, which will take you to the Welcome page. Then click on \"Sign up Now\" on the right side of the page.     You will be asked to enter the access code listed below, as well as some personal information. Please follow the directions to create your username and password.     Your access code is: [email or web address]  Expires: 2018  8:12 AM     Your access code will  in 90 days. If you need help or a new code, please call your [place] clinic or [phone].        Care EveryWhere ID     This is your Care EveryWhere ID. This could be used by other organizations to access your [place] medical records  [id number]        Your Vitals Were     Pulse Temperature Height Pulse Oximetry BMI (Body Mass Index)       84 98.3  F (36.8  C) (Oral) 5' 8\" (1.727 m) 96% 32.69 kg/m2        Blood Pressure from Last 3 Encounters:   18 136/80   17 136/66   17 140/82    Weight from Last 3 Encounters:   18 215 lb (97.5 kg)   17 224 lb (101.6 kg)   16 225 lb 3.2 oz (102.2 kg)              We Performed the Following     Albumin Random Urine Quantitative with Creat Ratio     BASIC METABOLIC PANEL     GASTROENTEROLOGY ADULT REF PROCEDURE ONLY     Hepatitis C Screen Reflex to HCV RNA Quant and Genotype     Lipid panel reflex to direct LDL Fasting     PROSTATE SPEC ANTIGEN " SCREEN          Today's Medication Changes          These changes are accurate as of: 1/5/18  8:12 AM.  If you have any questions, ask your nurse or doctor.               These medicines have changed or have updated prescriptions.        Dose/Directions    lisinopril 20 MG tablet   Commonly known as:  PRINIVIL/ZESTRIL   This may have changed:  See the new instructions.   Used for:  Essential hypertension with goal blood pressure less than 140/90   Changed by:  Estevan Blair MD        Dose:  20 mg   Take 1 tablet (20 mg) by mouth daily   Quantity:  90 tablet   Refills:  3       triamterene-hydrochlorothiazide 37.5-25 MG per capsule   Commonly known as:  DYAZIDE   This may have changed:  See the new instructions.   Used for:  Essential hypertension with goal blood pressure less than 140/90   Changed by:  Estevan Blair MD        Dose:  1 capsule   Take 1 capsule by mouth daily   Quantity:  90 capsule   Refills:  3            Where to get your medicines      These medications were sent to [place] [address]     Phone:  [phone]     lisinopril 20 MG tablet    triamterene-hydrochlorothiazide 37.5-25 MG per capsule                Primary Care Provider Office Phone # Fax #    Estevan Blair [phone]       [address] [place], [place] [address]        Equal Access to Services     [facility] CenterRAPHAEL AH: Hadii aad ku hadasho Soomaali, waaxda luqadaha, qaybta kaalmada adeegyada, mimi kern. So [facility] [phone].    ATENCIÓN: Si habla español, tiene a jimenes disposición servicios gratuitos de asistencia lingüística. Llame al [phone].    We comply with applicable federal civil rights laws and [place] laws. We do not discriminate on the basis of race, color, national origin, age, disability, sex, sexual orientation, or gender identity.            Thank you!     Thank  you for choosing [facility]  for your care. Our goal is always to provide you with excellent care. Hearing back from our patients is one way we can continue to improve our services. Please take a few minutes to complete the written survey that you may receive in the mail after your visit with us. Thank you!             Your Updated Medication List - Protect others around you: Learn how to safely use, store and throw away your medicines at www.disposemymeds.org.          This list is accurate as of: 1/5/18  8:12 AM.  Always use your most recent med list.                   Brand Name Dispense Instructions for use Diagnosis    lisinopril 20 MG tablet    PRINIVIL/ZESTRIL    90 tablet    Take 1 tablet (20 mg) by mouth daily    Essential hypertension with goal blood pressure less than 140/90       triamterene-hydrochlorothiazide 37.5-25 MG per capsule    DYAZIDE    90 capsule    Take 1 capsule by mouth daily    Essential hypertension with goal blood pressure less than 140/90

## 2018-01-05 NOTE — LETTER
[facility]  [address] [place] [address]  [place] [address]                  [phone]   January 10, 2018    Estevan PRABHAKAR Page  [address] [place] [address]      Dear Estevan,    Here is a summary of your recent test results:    Kidney function (GFR) is normal.   -Sodium is normal.   -Potassium is normal.   -Glucose (diabetic screening test) is normal.   -Calcium is elevated still and a followup lab test to check your PTH level is recommended.   -LDL(bad) cholesterol level is elevated, HDL(good) cholesterol level is low and your triglycerides are elevated which can increase your heart disease risk.  A diet high in fat and simple carbohydrates, genetics and being overweight can contribute to this. ADVISE: considering starting a medication to lower you heart disease risk - please let me know if I could send in a medication.  Also, exercise, a low fat, low carbohydrate diet, weight control, and omega-3 fatty acids (fish oil) 4576-3994 mg daily are helpful to improve this.  Rechecking your fasting cholesterol panel in 3 months is recommended (Lipid w/ LDL reflex, DX: hyperlipidemia)   -PSA (prostate specific antigen) test is normal.  This indicates a low likelihood of prostate cancer.  ADVISE: yearly recheck.   -Hepatitis C antibody screen test shows no signs of a previous hepatitis C infection.   -Microalbumin (urine protein) test is normal.  ADVISE: recheck annually     For additional lab test information, labtestsonline.org is an excellent reference.     Your test results are enclosed.      Please contact me if you have any questions.    In addition, here is a list of due or overdue Health Maintenance reminders.    Health Maintenance Due   Topic Date Due     Wellness Visit with your Primary Provider - yearly  1960     Colon Cancer Screening - every 10 years.  04/22/2009       Please call us at [phone] (or use [employer]) to address the above recommendations.             Thank you very much for trusting [facility]..     Healthy regards,          Chance Blair M.D.        Results for orders placed or performed in visit on 01/05/18   PROSTATE SPEC ANTIGEN SCREEN   Result Value Ref Range    PSA 2.69 0 - 4 ug/L   Hepatitis C Screen Reflex to HCV RNA Quant and Genotype   Result Value Ref Range    Hepatitis C Antibody Nonreactive NR^Nonreactive   BASIC METABOLIC PANEL   Result Value Ref Range    Sodium 137 133 - 144 mmol/L    Potassium 4.2 3.4 - 5.3 mmol/L    Chloride 104 94 - 109 mmol/L    Carbon Dioxide 24 20 - 32 mmol/L    Anion Gap 9 3 - 14 mmol/L    Glucose 93 70 - 99 mg/dL    Urea Nitrogen 19 7 - 30 mg/dL    Creatinine 0.94 0.66 - 1.25 mg/dL    GFR Estimate 82 >60 mL/min/1.7m2    GFR Estimate If Black >90 >60 mL/min/1.7m2    Calcium 11.2 (H) 8.5 - 10.1 mg/dL   Lipid panel reflex to direct LDL Fasting   Result Value Ref Range    Cholesterol 273 (H) <200 mg/dL    Triglycerides 341 (H) <150 mg/dL    HDL Cholesterol 33 (L) >39 mg/dL    LDL Cholesterol Calculated 172 (H) <100 mg/dL    Non HDL Cholesterol 240 (H) <130 mg/dL   Albumin Random Urine Quantitative with Creat Ratio   Result Value Ref Range    Creatinine Urine 24 mg/dL    Albumin Urine mg/L <5 mg/L    Albumin Urine mg/g Cr Unable to calculate due to low value 0 - 17 mg/g Cr

## 2018-01-05 NOTE — NURSING NOTE
"Chief Complaint   Patient presents with     Recheck Medication       Initial /80 (BP Location: Right arm, Patient Position: Sitting, Cuff Size: Adult Large)  Pulse 84  Temp 98.3  F (36.8  C) (Oral)  Ht 5' 8\" (1.727 m)  Wt 215 lb (97.5 kg)  SpO2 96%  BMI 32.69 kg/m2 Estimated body mass index is 32.69 kg/(m^2) as calculated from the following:    Height as of this encounter: 5' 8\" (1.727 m).    Weight as of this encounter: 215 lb (97.5 kg).  Medication Reconciliation: complete  "

## 2018-01-08 LAB — HCV AB SERPL QL IA: NONREACTIVE

## 2018-01-10 PROBLEM — E83.52 SERUM CALCIUM ELEVATED: Status: ACTIVE | Noted: 2018-01-10

## 2018-01-10 NOTE — PROGRESS NOTES
Note to Staff: please send a result letter    -Kidney function (GFR) is normal.  -Sodium is normal.  -Potassium is normal.  -Glucose (diabetic screening test) is normal.  -Calcium is elevated still and a followup lab test to check your PTH level is recommended.   -LDL(bad) cholesterol level is elevated, HDL(good) cholesterol level is low and your triglycerides are elevated which can increase your heart disease risk.  A diet high in fat and simple carbohydrates, genetics and being overweight can contribute to this. ADVISE: considering starting a medication to lower you heart disease risk - please let me know if I could send in a medication.  Also, exercise, a low fat, low carbohydrate diet, weight control, and omega-3 fatty acids (fish oil) 9391-0109 mg daily are helpful to improve this.  Rechecking your fasting cholesterol panel in 3 months is recommended (Lipid w/ LDL reflex, DX: hyperlipidemia)  -PSA (prostate specific antigen) test is normal.  This indicates a low likelihood of prostate cancer.  ADVISE: yearly recheck.   -Hepatitis C antibody screen test shows no signs of a previous hepatitis C infection.  -Microalbumin (urine protein) test is normal.  ADVISE: recheck annually     For additional lab test information, labtestsonline.org is an excellent reference.

## 2018-02-01 ENCOUNTER — TELEPHONE (OUTPATIENT)
Dept: FAMILY MEDICINE | Facility: CLINIC | Age: 58
End: 2018-02-01

## 2018-02-01 DIAGNOSIS — E78.5 HYPERLIPIDEMIA LDL GOAL <130: Primary | ICD-10-CM

## 2018-02-01 RX ORDER — ATORVASTATIN CALCIUM 10 MG/1
10 TABLET, FILM COATED ORAL DAILY
Qty: 90 TABLET | Refills: 3 | Status: SHIPPED | OUTPATIENT
Start: 2018-02-01 | End: 2019-02-04

## 2018-02-01 NOTE — TELEPHONE ENCOUNTER
Per 01/05/2018 result note:   -LDL(bad) cholesterol level is elevated, HDL(good) cholesterol level is low and your triglycerides are elevated which can increase your heart disease risk.  A diet high in fat and simple carbohydrates, genetics and being overweight can contribute to this. ADVISE: considering starting a medication to lower you heart disease risk - please let me know if I could send in a medication.    Patient requesting Rx (pharmacy pended)  Routing to PCP for further review/recommendations/orders.    Amalia Horne RN  [facility]

## 2018-02-01 NOTE — TELEPHONE ENCOUNTER
Reason for Call:  Medication or medication refill:    Do you use a [facility]?  Name of the pharmacy and phone number for the current request:  [facility] - [phone]    Name of the medication requested: High cholesterol medicine?    Other request: Dr Blair wants him to go on Med for his high cholesterol. Please prescribe him one & send RX to pharmacy. Let him know when done.    Can we leave a detailed message on this number? YES    Phone number patient can be reached at: Cell number on file:    Telephone Information:   Mobile None       Best Time: any      Call taken on 2/1/2018 at 10:34 AM by Irma Sheehan

## 2019-02-04 DIAGNOSIS — E78.5 HYPERLIPIDEMIA LDL GOAL <130: ICD-10-CM

## 2019-02-04 DIAGNOSIS — I10 ESSENTIAL HYPERTENSION WITH GOAL BLOOD PRESSURE LESS THAN 140/90: ICD-10-CM

## 2019-02-04 RX ORDER — LISINOPRIL 20 MG/1
TABLET ORAL
Qty: 30 TABLET | Refills: 0 | Status: SHIPPED | OUTPATIENT
Start: 2019-02-04 | End: 2019-03-07

## 2019-02-04 RX ORDER — ATORVASTATIN CALCIUM 10 MG/1
TABLET, FILM COATED ORAL
Qty: 30 TABLET | Refills: 0 | Status: SHIPPED | OUTPATIENT
Start: 2019-02-04 | End: 2019-03-07

## 2019-02-04 RX ORDER — TRIAMTERENE AND HYDROCHLOROTHIAZIDE 37.5; 25 MG/1; MG/1
CAPSULE ORAL
Qty: 30 CAPSULE | Refills: 0 | Status: SHIPPED | OUTPATIENT
Start: 2019-02-04 | End: 2019-03-07

## 2019-02-04 NOTE — TELEPHONE ENCOUNTER
"Requested Prescriptions   Pending Prescriptions Disp Refills     lisinopril (PRINIVIL/ZESTRIL) 20 MG tablet [Pharmacy Med Name: LISINOPRIL 20MG TABS] 120 tablet 0     Sig: TAKE ONE TABLET BY MOUTH DAILY    Last Refill:    Disp Refills Start End LOGAN   lisinopril (PRINIVIL/ZESTRIL) 20 MG tablet 90 tablet 3 1/5/2018  No   Sig - Route: Take 1 tablet (20 mg) by mouth daily - Oral     ACE Inhibitors (Including Combos) Protocol Failed - 2/4/2019  1:41 PM       Failed - Blood pressure under 140/90 in past 12 months    BP Readings from Last 3 Encounters:   01/05/18 136/80   07/11/17 136/66 03/14/17 140/82          Failed - Recent (12 mo) or future (30 days) visit within the authorizing provider's specialty    Patient had office visit in the last 12 months or has a visit in the next 30 days with authorizing provider or within the authorizing provider's specialty.  See \"Patient Info\" tab in inbasket, or \"Choose Columns\" in Meds & Orders section of the refill encounter.      LOV: 01/05/2018         Failed - Normal serum creatinine on file in past 12 months    Recent Labs   Lab Test 01/05/18  0813   CR 0.94          Failed - Normal serum potassium on file in past 12 months    Recent Labs   Lab Test 01/05/18  0813   POTASSIUM 4.2          Passed - Medication is active on med list       Passed - Patient is age 18 or older        triamterene-HCTZ (DYAZIDE) 37.5-25 MG capsule [Pharmacy Med Name: TRIAMTERENE-HCTZ 37.5-25MG CAPS] 120 capsule 0     Sig: TAKE ONE CAPSULE BY MOUTH DAILY    Last Refill:   triamterene-hydrochlorothiazide (DYAZIDE) 37.5-25 MG per capsule 90 capsule 3 1/5/2018  No   Sig - Route: Take 1 capsule by mouth daily - Oral     Diuretics (Including Combos) Protocol Failed - 2/4/2019  1:41 PM       Failed - Blood pressure under 140/90 in past 12 months    BP Readings from Last 3 Encounters:   01/05/18 136/80   07/11/17 136/66   03/14/17 140/82          Failed - Recent (12 mo) or future (30 days) visit within the " "authorizing provider's specialty    Patient had office visit in the last 12 months or has a visit in the next 30 days with authorizing provider or within the authorizing provider's specialty.  See \"Patient Info\" tab in inbasket, or \"Choose Columns\" in Meds & Orders section of the refill encounter.      LOV: 01/05/2018         Failed - Normal serum creatinine on file in past 12 months    Recent Labs   Lab Test 01/05/18  0813   CR 0.94           Failed - Normal serum potassium on file in past 12 months    Recent Labs   Lab Test 01/05/18  0813   POTASSIUM 4.2           Failed - Normal serum sodium on file in past 12 months    Recent Labs   Lab Test 01/05/18  0813              Passed - Medication is active on med list       Passed - Patient is age 18 or older        atorvastatin (LIPITOR) 10 MG tablet [Pharmacy Med Name: ATORVASTATIN CALCIUM 10MG TABS] 120 tablet 0     Sig: TAKE ONE TABLET BY MOUTH DAILY    Last Refill:   atorvastatin (LIPITOR) 10 MG tablet 90 tablet 3 2/1/2018  --   Sig - Route: Take 1 tablet (10 mg) by mouth daily - Oral     Statins Protocol Failed - 2/4/2019  1:41 PM       Failed - LDL on file in past 12 months    Recent Labs   Lab Test 01/05/18  0813   *          Failed - Recent (12 mo) or future (30 days) visit within the authorizing provider's specialty    Patient had office visit in the last 12 months or has a visit in the next 30 days with authorizing provider or within the authorizing provider's specialty.  See \"Patient Info\" tab in inRedMicasket, or \"Choose Columns\" in Meds & Orders section of the refill encounter.      LOV: 01/05/2018         Passed - No abnormal creatine kinase in past 12 months    No lab results found.          Passed - Medication is active on med list       Passed - Patient is age 18 or older        Patient due for fasting physical - no future appt scheduled  30 day supply sent with note to schedule    Amalia Horne RN  [place] [facility]  "

## 2019-02-18 ENCOUNTER — ALLIED HEALTH/NURSE VISIT (OUTPATIENT)
Dept: FAMILY MEDICINE | Facility: CLINIC | Age: 59
End: 2019-02-18
Payer: COMMERCIAL

## 2019-02-18 VITALS — SYSTOLIC BLOOD PRESSURE: 130 MMHG | DIASTOLIC BLOOD PRESSURE: 80 MMHG

## 2019-02-18 DIAGNOSIS — I10 ESSENTIAL HYPERTENSION WITH GOAL BLOOD PRESSURE LESS THAN 140/90: Primary | ICD-10-CM

## 2019-02-18 PROCEDURE — 99207 ZZC NO CHARGE NURSE ONLY: CPT | Performed by: FAMILY MEDICINE

## 2019-02-19 NOTE — PROGRESS NOTES
Estevan Aaron is enrolled/participating in the retail pharmacy Blood Pressure Goals Achievement Program (BPGAP).  Estevan Aaron was evaluated at [facility] on February 18, 2019 at which time his blood pressure was:    BP Readings from Last 3 Encounters:   02/18/19 130/80   01/05/18 136/80   07/11/17 136/66     Reviewed lifestyle modifications for blood pressure control and reduction: including making healthy food choices, managing weight, getting regular exercise, smoking cessation, reducing alcohol consumption, monitoring blood pressure regularly.     Estevan Aaron is not experiencing symptoms.    Follow-Up: BP is at goal of < 140/90mmHg (patient 18+ years of age with or without diabetes).  Recommended follow-up at pharmacy in 6 months.         This note completed by:     Kalyan Ng [facility] Pharmacist on behalf of: [facility]

## 2019-03-06 DIAGNOSIS — I10 ESSENTIAL HYPERTENSION WITH GOAL BLOOD PRESSURE LESS THAN 140/90: ICD-10-CM

## 2019-03-06 DIAGNOSIS — E78.5 HYPERLIPIDEMIA LDL GOAL <130: ICD-10-CM

## 2019-03-06 NOTE — TELEPHONE ENCOUNTER
"Requested Prescriptions   Pending Prescriptions Disp Refills     triamterene-HCTZ (DYAZIDE) 37.5-25 MG capsule [Pharmacy Med Name: TRIAMTERENE-HCTZ 37.5-25MG CAPS] 30 capsule 0      Last Written Prescription Date:  2/4/19  Last Fill Quantity: 30,  # refills: 0   Last office visit: 1/5/2018 with prescribing provider:     Future Office Visit:         Sig: TAKE ONE CAPSULE BY MOUTH ONCE DAILY (NEED TO BE SEEN IN CLINIC FOR FURTHER REFILLS)    Diuretics (Including Combos) Protocol Failed - 3/6/2019 12:52 PM       Failed - Recent (12 mo) or future (30 days) visit within the authorizing provider's specialty    Patient had office visit in the last 12 months or has a visit in the next 30 days with authorizing provider or within the authorizing provider's specialty.  See \"Patient Info\" tab in inbasket, or \"Choose Columns\" in Meds & Orders section of the refill encounter.             Failed - Normal serum creatinine on file in past 12 months    Recent Labs   Lab Test 01/05/18  0813   CR 0.94             Failed - Normal serum potassium on file in past 12 months    Recent Labs   Lab Test 01/05/18  0813   POTASSIUM 4.2                   Failed - Normal serum sodium on file in past 12 months    Recent Labs   Lab Test 01/05/18  0813                Passed - Blood pressure under 140/90 in past 12 months    BP Readings from Last 3 Encounters:   02/18/19 130/80   01/05/18 136/80   07/11/17 136/66                Passed - Medication is active on med list       Passed - Patient is age 18 or older        atorvastatin (LIPITOR) 10 MG tablet [Pharmacy Med Name: ATORVASTATIN CALCIUM 10MG TABS] 30 tablet 0    Last Written Prescription Date:  2/4/2019  Last Fill Quantity: 30,  # refills: 0   Last office visit: 1/5/2018 with prescribing provider:     Future Office Visit:       Sig: TAKE ONE TABLET BY MOUTH ONCE DAILY (NEED TO BE SEEN IN CLINIC FOR FURTHER REFILLS)    Statins Protocol Failed - 3/6/2019 12:52 PM       Failed - LDL on file " "in past 12 months    Recent Labs   Lab Test 01/05/18  0813   *            Failed - Recent (12 mo) or future (30 days) visit within the authorizing provider's specialty    Patient had office visit in the last 12 months or has a visit in the next 30 days with authorizing provider or within the authorizing provider's specialty.  See \"Patient Info\" tab in inbasket, or \"Choose Columns\" in Meds & Orders section of the refill encounter.             Passed - No abnormal creatine kinase in past 12 months    No lab results found.            Passed - Medication is active on med list       Passed - Patient is age 18 or older        lisinopril (PRINIVIL/ZESTRIL) 20 MG tablet [Pharmacy Med Name: LISINOPRIL 20MG TABS] 30 tablet 0      Last Written Prescription Date:  2/4/2019  Last Fill Quantity: 30,  # refills: 0   Last office visit: 1/5/2018 with prescribing provider:     Future Office Visit:         Sig: TAKE ONE TABLET BY MOUTH ONCE DAILY    ACE Inhibitors (Including Combos) Protocol Failed - 3/6/2019 12:52 PM       Failed - Recent (12 mo) or future (30 days) visit within the authorizing provider's specialty    Patient had office visit in the last 12 months or has a visit in the next 30 days with authorizing provider or within the authorizing provider's specialty.  See \"Patient Info\" tab in inbasket, or \"Choose Columns\" in Meds & Orders section of the refill encounter.             Failed - Normal serum creatinine on file in past 12 months    Recent Labs   Lab Test 01/05/18  0813   CR 0.94            Failed - Normal serum potassium on file in past 12 months    Recent Labs   Lab Test 01/05/18  0813   POTASSIUM 4.2            Passed - Blood pressure under 140/90 in past 12 months    BP Readings from Last 3 Encounters:   02/18/19 130/80   01/05/18 136/80   07/11/17 136/66                Passed - Medication is active on med list       Passed - Patient is age 18 or older        "

## 2019-03-07 RX ORDER — ATORVASTATIN CALCIUM 10 MG/1
10 TABLET, FILM COATED ORAL DAILY
Qty: 30 TABLET | Refills: 0 | Status: SHIPPED | OUTPATIENT
Start: 2019-03-07 | End: 2019-03-14

## 2019-03-07 RX ORDER — TRIAMTERENE AND HYDROCHLOROTHIAZIDE 37.5; 25 MG/1; MG/1
1 CAPSULE ORAL DAILY
Qty: 30 CAPSULE | Refills: 0 | Status: SHIPPED | OUTPATIENT
Start: 2019-03-07 | End: 2019-03-14

## 2019-03-07 RX ORDER — TRIAMTERENE AND HYDROCHLOROTHIAZIDE 37.5; 25 MG/1; MG/1
CAPSULE ORAL
Qty: 30 CAPSULE | Refills: 0 | OUTPATIENT
Start: 2019-03-07

## 2019-03-07 RX ORDER — LISINOPRIL 20 MG/1
TABLET ORAL
Qty: 30 TABLET | Refills: 0 | OUTPATIENT
Start: 2019-03-07

## 2019-03-07 RX ORDER — ATORVASTATIN CALCIUM 10 MG/1
TABLET, FILM COATED ORAL
Qty: 30 TABLET | Refills: 0 | OUTPATIENT
Start: 2019-03-07

## 2019-03-07 RX ORDER — LISINOPRIL 20 MG/1
20 TABLET ORAL DAILY
Qty: 30 TABLET | Refills: 0 | Status: SHIPPED | OUTPATIENT
Start: 2019-03-07 | End: 2019-03-14

## 2019-03-07 NOTE — TELEPHONE ENCOUNTER
Patient already given 1x lizzy refill and did not follow up (due for FASTING PHYSICAL) - no future appointment scheduled  Rx denied. Pharmacy notified    Routing to response pool to call/schedule      Amalia Horne RN  [place] [facility]

## 2019-03-07 NOTE — TELEPHONE ENCOUNTER
Attempt #1  Called patient @ [phone] - Left a non-detailed message to call back.    If patient calls back, please schedule a FASTING PHYSICAL and route back to RN team.       Amalia Horne RN  [facility]

## 2019-03-14 ENCOUNTER — OFFICE VISIT (OUTPATIENT)
Dept: FAMILY MEDICINE | Facility: CLINIC | Age: 59
End: 2019-03-14
Payer: COMMERCIAL

## 2019-03-14 VITALS
BODY MASS INDEX: 31.22 KG/M2 | SYSTOLIC BLOOD PRESSURE: 126 MMHG | OXYGEN SATURATION: 96 % | HEART RATE: 82 BPM | DIASTOLIC BLOOD PRESSURE: 80 MMHG | TEMPERATURE: 98.6 F | HEIGHT: 68 IN | WEIGHT: 206 LBS

## 2019-03-14 DIAGNOSIS — I10 HYPERTENSION GOAL BP (BLOOD PRESSURE) < 130/80: ICD-10-CM

## 2019-03-14 DIAGNOSIS — E78.5 HYPERLIPIDEMIA LDL GOAL <130: ICD-10-CM

## 2019-03-14 DIAGNOSIS — L82.1 SEBORRHEIC KERATOSIS: ICD-10-CM

## 2019-03-14 DIAGNOSIS — Z00.00 ROUTINE GENERAL MEDICAL EXAMINATION AT A HEALTH CARE FACILITY: Primary | ICD-10-CM

## 2019-03-14 DIAGNOSIS — Z12.11 SCREEN FOR COLON CANCER: ICD-10-CM

## 2019-03-14 DIAGNOSIS — E83.52 SERUM CALCIUM ELEVATED: ICD-10-CM

## 2019-03-14 DIAGNOSIS — E21.3 HYPERPARATHYROIDISM (H): ICD-10-CM

## 2019-03-14 DIAGNOSIS — Z12.5 SCREENING FOR PROSTATE CANCER: ICD-10-CM

## 2019-03-14 LAB
ERYTHROCYTE [DISTWIDTH] IN BLOOD BY AUTOMATED COUNT: 19.7 % (ref 10–15)
HCT VFR BLD AUTO: 46.8 % (ref 40–53)
HGB BLD-MCNC: 16.3 G/DL (ref 13.3–17.7)
MCH RBC QN AUTO: 20.5 PG (ref 26.5–33)
MCHC RBC AUTO-ENTMCNC: 34.8 G/DL (ref 31.5–36.5)
MCV RBC AUTO: 59 FL (ref 78–100)
PLATELET # BLD AUTO: 270 10E9/L (ref 150–450)
PTH-INTACT SERPL-MCNC: 103 PG/ML (ref 18–80)
RBC # BLD AUTO: 7.96 10E12/L (ref 4.4–5.9)
WBC # BLD AUTO: 7.6 10E9/L (ref 4–11)

## 2019-03-14 PROCEDURE — 82043 UR ALBUMIN QUANTITATIVE: CPT | Performed by: FAMILY MEDICINE

## 2019-03-14 PROCEDURE — 80048 BASIC METABOLIC PNL TOTAL CA: CPT | Performed by: FAMILY MEDICINE

## 2019-03-14 PROCEDURE — 80061 LIPID PANEL: CPT | Performed by: FAMILY MEDICINE

## 2019-03-14 PROCEDURE — 17110 DESTRUCTION B9 LES UP TO 14: CPT | Performed by: FAMILY MEDICINE

## 2019-03-14 PROCEDURE — 36415 COLL VENOUS BLD VENIPUNCTURE: CPT | Performed by: FAMILY MEDICINE

## 2019-03-14 PROCEDURE — 99396 PREV VISIT EST AGE 40-64: CPT | Mod: 25 | Performed by: FAMILY MEDICINE

## 2019-03-14 PROCEDURE — 85027 COMPLETE CBC AUTOMATED: CPT | Performed by: FAMILY MEDICINE

## 2019-03-14 PROCEDURE — 83970 ASSAY OF PARATHORMONE: CPT | Performed by: FAMILY MEDICINE

## 2019-03-14 PROCEDURE — G0103 PSA SCREENING: HCPCS | Performed by: FAMILY MEDICINE

## 2019-03-14 RX ORDER — TRIAMTERENE AND HYDROCHLOROTHIAZIDE 37.5; 25 MG/1; MG/1
1 CAPSULE ORAL DAILY
Qty: 90 CAPSULE | Refills: 3 | Status: SHIPPED | OUTPATIENT
Start: 2019-03-14 | End: 2019-03-16 | Stop reason: SINTOL

## 2019-03-14 RX ORDER — ATORVASTATIN CALCIUM 10 MG/1
10 TABLET, FILM COATED ORAL DAILY
Qty: 90 TABLET | Refills: 3 | Status: SHIPPED | OUTPATIENT
Start: 2019-03-14 | End: 2020-04-02

## 2019-03-14 RX ORDER — LISINOPRIL 20 MG/1
20 TABLET ORAL DAILY
Qty: 90 TABLET | Refills: 3 | Status: SHIPPED | OUTPATIENT
Start: 2019-03-14 | End: 2020-04-02

## 2019-03-14 ASSESSMENT — MIFFLIN-ST. JEOR: SCORE: 1723.91

## 2019-03-14 NOTE — PROGRESS NOTES
SUBJECTIVE:   CC: Estevan Aaron is an 58 year old male who presents for preventive health visit.     Healthy Habits:    Do you get at least three servings of calcium containing foods daily (dairy, green leafy vegetables, etc.)? yes    Amount of exercise or daily activities, outside of work: work is active    Problems taking medications regularly No    Medication side effects: No    Have you had an eye exam in the past two years? yes    Do you see a dentist twice per year? no    Do you have sleep apnea, excessive snoring or daytime drowsiness?sleep apnea    Hyperlipidemia Follow-Up    Rate your low fat/cholesterol diet?: good    Taking statin?  Yes, no muscle aches from statin    Other lipid medications/supplements?:  None    Medication: atorvastatin     Status: Santos is doing well on his medication daily without complication.    Hypertension Follow-up    Outpatient blood pressures some times    Low Salt Diet: low salt    Medication: lisinopril, triamterene-hydrochlorothiazide    Status: Santos is doing well on his medication daily without complication.      Today's PHQ-2 Score:   PHQ-2 ( 1999 Pfizer) 3/14/2019 1/31/2012   Q1: Little interest or pleasure in doing things 0 0   Q2: Feeling down, depressed or hopeless 0 0   PHQ-2 Score 0 0       Abuse: Current or Past(Physical, Sexual or Emotional)- No  Do you feel safe in your environment? Yes    Social History     Tobacco Use     Smoking status: Current Every Day Smoker     Packs/day: 0.50     Smokeless tobacco: Never Used     Tobacco comment: has cut down a lot - is very interested in [employer] quit program- referral given 4/19/06   Substance Use Topics     Alcohol use: Yes     Alcohol/week: 0.0 oz     If you drink alcohol do you typically have >3 drinks per day or >7 drinks per week? No                      Last PSA:   PSA   Date Value Ref Range Status   03/14/2019 2.49 0 - 4 ug/L Final     Comment:     Assay Method:  Chemiluminescence using Siemens Vista analyzer  "      Reviewed orders with patient. Reviewed health maintenance and updated orders accordingly - Yes  Labs reviewed in EPIC    Reviewed and updated as needed this visit by clinical staff  Tobacco  Allergies  Meds  Problems  Med Hx  Surg Hx  Fam Hx  Soc Hx          Reviewed and updated as needed this visit by Provider  Tobacco  Allergies  Meds  Problems  Med Hx  Surg Hx  Fam Hx        ROS:  Constitutional, HEENT, cardiovascular, pulmonary, GI, , musculoskeletal, neuro, skin, endocrine and psych systems are negative, except as otherwise noted.  This document serves as a record of the services and decisions personally performed and made by Estevan Blair MD. It was created on his behalf by Deepak Coto, a trained medical scribe. The creation of this document is based the provider's statements to the medical scribe.  Scribe Deepak Coto 12:16 PM, March 14, 2019    OBJECTIVE:   /80   Pulse 82   Temp 98.6  F (37  C) (Oral)   Ht 1.727 m (5' 8\")   Wt 93.4 kg (206 lb)   SpO2 96%   BMI 31.32 kg/m    EXAM:  GENERAL: healthy, alert, well nourished, well hydrated, no distress  EYES: Eyes grossly normal to inspection, extraocular movements - intact, and PERRL  HENT: ear canals- normal; TMs- normal; Nose- normal; Mouth- no ulcers, no lesions  NECK: no tenderness, no adenopathy, no asymmetry, no masses, no stiffness; thyroid- normal to palpation  RESP: lungs clear to auscultation - no rales, no rhonchi, no wheezes  CV: regular rates and rhythm, normal S1 S2, no S3 or S4 and no murmur, no click or rub -  ABDOMEN: soft, no tenderness, no  hepatosplenomegaly, no masses, normal bowel sounds  MS: extremities- no gross deformities noted, no edema  SKIN: 3mm brown growth on the left side of the nasal bridge, 3mm brown growth lateral to the left lower lid, 2mm brown growth left lateral inferior lid, otherwise no suspicious lesions, no rashes  BACK: no CVA tenderness, no paralumbar tenderness  - male: testicles- normal, no " atrophy, no masses;  no inguinal hernias  RECTAL- male: no masses, no hemorhoids, Prostate- symmetric, no  nodularity, no masses, no hypertrophy  PSYCH: Alert and oriented times 3; speech- coherent , normal rate and volume; able to articulate logical thoughts, able to abstract reason, no tangential thoughts, no hallucinations or delusions, affect- normal  LYMPHATICS: ant. cervical- normal, post. cervical- normal, axillary- normal, supraclavicular- normal, inguinal- normal  NEURO: strength and tone- normal, sensory exam- grossly normal, mentation- intact speech- normal, reflexes- symmetric    Procedure Note (Seborrheic Keratoses)  Liquid nitrogen was applied for 5-10 seconds x3 to the skin lesions at the left eyelid and left bridge of nose and the expected blistering or scabbing reaction explained. Do not pick at the areas. Patient reminded to expect hypopigmented scars from the procedure. Return if lesions fail to fully resolve.    ASSESSMENT/PLAN:   Estevan was seen today for physical.    Diagnoses and all orders for this visit:    Routine general medical examination at a health care facility  -     PROSTATE SPEC ANTIGEN SCREEN  -     BASIC METABOLIC PANEL  -     CBC with platelets    Hypertension goal BP (blood pressure) < 130/80 - Controlled, continue:   -     Albumin Random Urine Quantitative with Creat Ratio  -     lisinopril (PRINIVIL/ZESTRIL) 20 MG tablet; Take 1 tablet (20 mg) by mouth daily  -     triamterene-HCTZ (DYAZIDE) 37.5-25 MG capsule; Take 1 capsule by mouth daily    Hyperlipidemia LDL goal <130 - Controlled, continue:   -     atorvastatin (LIPITOR) 10 MG tablet; Take 1 tablet (10 mg) by mouth daily  -     Lipid panel reflex to direct LDL Fasting    Seborrheic keratosis - see procedure note  -     DESTRUCT BENIGN LESION, UP TO 14    Serum calcium elevated  -     **Parathyroid Hormone Intact FUTURE 2mo    Screening for prostate cancer  -     PROSTATE SPEC ANTIGEN SCREEN    Screen for colon  "cancer      COUNSELING:  Reviewed preventive health counseling, as reflected in patient instructions       Regular exercise       Healthy diet/nutrition    BP Readings from Last 1 Encounters:   03/14/19 126/80     Estimated body mass index is 31.32 kg/m  as calculated from the following:    Height as of this encounter: 1.727 m (5' 8\").    Weight as of this encounter: 93.4 kg (206 lb).   reports that he has been smoking.  He has been smoking about 0.50 packs per day. he has never used smokeless tobacco.    Counseling Resources:  ATP IV Guidelines  Pooled Cohorts Equation Calculator  FRAX Risk Assessment  ICSI Preventive Guidelines  Dietary Guidelines for Americans, 2010  USDA's MyPlate  ASA Prophylaxis  Lung CA Screening    The information in this document, created by the medical scribe for me, accurately reflects the services I personally performed and the decisions made by me. I have reviewed and approved this document for accuracy prior to leaving the patient care area.  12:17 PM, 03/14/19      Estevan Blair MD  [facility] [place]  "

## 2019-03-15 LAB
ANION GAP SERPL CALCULATED.3IONS-SCNC: 9 MMOL/L (ref 3–14)
BUN SERPL-MCNC: 14 MG/DL (ref 7–30)
CALCIUM SERPL-MCNC: 12 MG/DL (ref 8.5–10.1)
CHLORIDE SERPL-SCNC: 103 MMOL/L (ref 94–109)
CHOLEST SERPL-MCNC: 185 MG/DL
CO2 SERPL-SCNC: 24 MMOL/L (ref 20–32)
CREAT SERPL-MCNC: 1.01 MG/DL (ref 0.66–1.25)
CREAT UR-MCNC: 21 MG/DL
GFR SERPL CREATININE-BSD FRML MDRD: 81 ML/MIN/{1.73_M2}
GLUCOSE SERPL-MCNC: 79 MG/DL (ref 70–99)
HDLC SERPL-MCNC: 35 MG/DL
LDLC SERPL CALC-MCNC: 123 MG/DL
MICROALBUMIN UR-MCNC: 7 MG/L
MICROALBUMIN/CREAT UR: 35.94 MG/G CR (ref 0–17)
NONHDLC SERPL-MCNC: 150 MG/DL
POTASSIUM SERPL-SCNC: 4.3 MMOL/L (ref 3.4–5.3)
PSA SERPL-ACNC: 2.49 UG/L (ref 0–4)
SODIUM SERPL-SCNC: 136 MMOL/L (ref 133–144)
TRIGL SERPL-MCNC: 134 MG/DL

## 2019-03-16 RX ORDER — FUROSEMIDE 20 MG
20 TABLET ORAL DAILY
Qty: 90 TABLET | Refills: 3 | Status: SHIPPED | OUTPATIENT
Start: 2019-03-16 | End: 2020-03-05

## 2019-03-16 NOTE — RESULT ENCOUNTER NOTE
Dear Santos,    Here is a summary of your recent test results:  -Normal red blood cell (hgb) levels, normal white blood cell count and normal platelet levels.  -PSA (prostate specific antigen) test is normal.  This indicates a low likelihood of prostate cancer.  ADVISE: rechecking this in 1 year.  -Cholesterol levels are at your goal levels.  ADVISE: continuing your medication, a regular exercise program with at least 150 minutes of aerobic exercise per week, and eating a low saturated fat/low carbohydrate diet.  Also, you should recheck this fasting cholesterol panel in 12 months.  -Kidney function (GFR) is normal.  -Sodium is normal.  -Potassium is normal.  -Calcium is elevated still this can be related to your triamterene.hydrocholrothiazide medication but also it can be related to an overactive parathyroid gland.  ADVISE: stopping the triamterene/hydrochhlorothizide and starting furosemide 20 mg instead (a new prescription was sent to the pharmacy).  When you  the furosemide I would like you to stop by the lab to pick a jug to collect your urine for 24 hours.  Lastly you should recheck your blood in and blood pressure in about 1-2 weeks after starting the furosemide to see what happens to your labs.    -Glucose (diabetic screening test) is normal.  -Microalbumin (urine protein) level is elevated. This is suggestive of early damage to your kidneys from high blood pressure.  ADVISE: avoiding anti-inflamatory agents such as ibuprofen (Advil, Motrin) or naproxen (Aleve) as much as possible, keeping your blood pressure in a normal range, and continuing your medication (lisinopril) that helps protect your kidneys.  Also, this should be rechecked in 1 year.     For additional lab test information, labtestsonline.org is an excellent reference.           Thank you very much for trusting me and [facility].     Healthy regards,  Chance Blair MD

## 2019-03-18 ENCOUNTER — TELEPHONE (OUTPATIENT)
Dept: FAMILY MEDICINE | Facility: CLINIC | Age: 59
End: 2019-03-18

## 2019-03-18 NOTE — TELEPHONE ENCOUNTER
Pt called. WEnt through lab results.  The patient indicates understanding of these issues and agrees with the plan.    Rebeca Rowe RN    [facility][note 3]

## 2019-04-03 ENCOUNTER — ALLIED HEALTH/NURSE VISIT (OUTPATIENT)
Dept: FAMILY MEDICINE | Facility: CLINIC | Age: 59
End: 2019-04-03

## 2019-04-03 VITALS — DIASTOLIC BLOOD PRESSURE: 75 MMHG | SYSTOLIC BLOOD PRESSURE: 132 MMHG

## 2019-04-03 DIAGNOSIS — E83.52 SERUM CALCIUM ELEVATED: ICD-10-CM

## 2019-04-03 DIAGNOSIS — I10 HYPERTENSION GOAL BP (BLOOD PRESSURE) < 130/80: ICD-10-CM

## 2019-04-03 DIAGNOSIS — I10 HYPERTENSION GOAL BP (BLOOD PRESSURE) < 130/80: Primary | ICD-10-CM

## 2019-04-03 PROCEDURE — 82306 VITAMIN D 25 HYDROXY: CPT | Performed by: FAMILY MEDICINE

## 2019-04-03 PROCEDURE — 99207 ZZC NO CHARGE NURSE ONLY: CPT | Performed by: FAMILY MEDICINE

## 2019-04-03 PROCEDURE — 36415 COLL VENOUS BLD VENIPUNCTURE: CPT | Performed by: FAMILY MEDICINE

## 2019-04-03 PROCEDURE — 80048 BASIC METABOLIC PNL TOTAL CA: CPT | Performed by: FAMILY MEDICINE

## 2019-04-03 NOTE — PROGRESS NOTES
Estevan Aaron is enrolled/participating in the retail pharmacy Blood Pressure Goals Achievement Program (BPGAP).  Estevan Aaron was evaluated at [facility] on April 3, 2019 at which time his blood pressure was:    BP Readings from Last 3 Encounters:   04/03/19 132/75   03/14/19 126/80   02/18/19 130/80     Reviewed lifestyle modifications for blood pressure control and reduction: including making healthy food choices, managing weight, getting regular exercise, smoking cessation, reducing alcohol consumption, monitoring blood pressure regularly.     Estevan Aaron is not experiencing symptoms.    Follow-Up: BP is not at goal of < 130/80, Recommended follow-up in 1 month at the pharmacy. Routing to PCP as an FYI.    Recommendation to Provider: patient was almost at goal. He said he had a little extra stress today so attributed his elevated BP to that. I suggest he come back in 1 month to pharmacy to be checked again - and if continues to be high then follow up with provider.    This note completed by: Laila Mane, PharmD  [facility]  PH: [phone]

## 2019-04-04 LAB
ANION GAP SERPL CALCULATED.3IONS-SCNC: 7 MMOL/L (ref 3–14)
BUN SERPL-MCNC: 12 MG/DL (ref 7–30)
CALCIUM SERPL-MCNC: 11 MG/DL (ref 8.5–10.1)
CHLORIDE SERPL-SCNC: 108 MMOL/L (ref 94–109)
CO2 SERPL-SCNC: 25 MMOL/L (ref 20–32)
CREAT SERPL-MCNC: 0.86 MG/DL (ref 0.66–1.25)
DEPRECATED CALCIDIOL+CALCIFEROL SERPL-MC: 10 UG/L (ref 20–75)
GFR SERPL CREATININE-BSD FRML MDRD: >90 ML/MIN/{1.73_M2}
GLUCOSE SERPL-MCNC: 117 MG/DL (ref 70–99)
POTASSIUM SERPL-SCNC: 4.2 MMOL/L (ref 3.4–5.3)
SODIUM SERPL-SCNC: 140 MMOL/L (ref 133–144)

## 2019-04-05 DIAGNOSIS — E83.52 SERUM CALCIUM ELEVATED: ICD-10-CM

## 2019-04-05 PROCEDURE — 81050 URINALYSIS VOLUME MEASURE: CPT | Performed by: FAMILY MEDICINE

## 2019-04-05 PROCEDURE — 82340 ASSAY OF CALCIUM IN URINE: CPT | Performed by: FAMILY MEDICINE

## 2019-04-06 LAB
CALCIUM 24H UR-MRATE: 0.28 G/24 H (ref 0.1–0.3)
CALCIUM UR-MCNC: 9.5 MG/DL
CALCIUM/CREAT UR: 0.2 G/G CR
COLLECT DURATION TIME UR: NORMAL H
CREAT 24H UR-MRATE: 1.41 G/(24.H) (ref 1–2)
CREAT UR-MCNC: 47 MG/DL
SPECIMEN VOL UR: NORMAL ML

## 2019-11-20 ENCOUNTER — ALLIED HEALTH/NURSE VISIT (OUTPATIENT)
Dept: FAMILY MEDICINE | Facility: CLINIC | Age: 59
End: 2019-11-20
Payer: COMMERCIAL

## 2019-11-20 VITALS — SYSTOLIC BLOOD PRESSURE: 148 MMHG | DIASTOLIC BLOOD PRESSURE: 84 MMHG

## 2019-11-20 DIAGNOSIS — I10 HYPERTENSION GOAL BP (BLOOD PRESSURE) < 130/80: Primary | ICD-10-CM

## 2019-11-20 PROCEDURE — 99207 ZZC NO CHARGE NURSE ONLY: CPT | Performed by: FAMILY MEDICINE

## 2019-11-20 NOTE — PROGRESS NOTES
Estevan Aaron was evaluated at [facility] on November 20, 2019 at which time his blood pressure was:    BP Readings from Last 3 Encounters:   11/20/19 (!) 148/84   04/03/19 132/75   03/14/19 126/80     Pulse Readings from Last 3 Encounters:   03/14/19 82   01/05/18 84   01/09/17 105       Reviewed lifestyle modifications for blood pressure control and reduction: including making healthy food choices, managing weight, getting regular exercise, smoking cessation, reducing alcohol consumption, monitoring blood pressure regularly.     Symptoms: None    BP Goal:Other: <130/80    BP Assessment:  BP too high    Potential Reasons for BP too high: Tobacco use within past 30 minutes, salty foods, and 1 pot coffee today     BP Follow-Up Plan: Recheck BP in 30 days at pharmacy    Recommendation to Provider: Recheck in 1 month.  Discussed reducing factors that contribute to elevated blood pressure.  Pt aware and will consider.    Note completed by: Thank you,  Laila Steel RPh, Mgr [facility] [place] [phone]

## 2020-03-03 DIAGNOSIS — I10 HYPERTENSION GOAL BP (BLOOD PRESSURE) < 130/80: ICD-10-CM

## 2020-03-03 NOTE — LETTER
[address] [place] [address]                                                                                                       [phone]    March 12, 2020    Estevan PRABHAKAR Page  [phone] [place] [address]      Estevan:    We have been calling you regarding a recent refill request we received for Lasix.  Unfortunately, we were unable to reach you.  We are notifying you that you are due for Nurse visit for a blood pressure check prior to your next refill.  You can schedule this appointment via [facility] or by calling the clinic at [phone].            Sincerely,          Chance Blair M.D./[facility]

## 2020-03-03 NOTE — TELEPHONE ENCOUNTER
"Requested Prescriptions   Pending Prescriptions Disp Refills     furosemide (LASIX) 20 MG tablet [Pharmacy Med Name: FUROSEMIDE 20MG TABS] 90 tablet 3     Sig: TAKE ONE TABLET BY MOUTH ONCE DAILY     Last Written Prescription Date:  3/16/2019  Last Fill Quantity: 90,  # refills: 3   Last office visit: 3/14/2019 with prescribing provider:     Future Office Visit:          Diuretics (Including Combos) Protocol Failed - 3/3/2020  5:01 AM        Failed - Blood pressure under 140/90 in past 12 months     BP Readings from Last 3 Encounters:   11/20/19 (!) 148/84   04/03/19 132/75   03/14/19 126/80                 Passed - Recent (12 mo) or future (30 days) visit within the authorizing provider's specialty     Patient has had an office visit with the authorizing provider or a provider within the authorizing providers department within the previous 12 mos or has a future within next 30 days. See \"Patient Info\" tab in inbasket, or \"Choose Columns\" in Meds & Orders section of the refill encounter.              Passed - Medication is active on med list        Passed - Patient is age 18 or older        Passed - Normal serum creatinine on file in past 12 months     Recent Labs   Lab Test 04/03/19  1058   CR 0.86              Passed - Normal serum potassium on file in past 12 months     Recent Labs   Lab Test 04/03/19  1058   POTASSIUM 4.2                    Passed - Normal serum sodium on file in past 12 months     Recent Labs   Lab Test 04/03/19  1058                 "

## 2020-03-05 RX ORDER — FUROSEMIDE 20 MG
TABLET ORAL
Qty: 90 TABLET | Refills: 3 | Status: SHIPPED | OUTPATIENT
Start: 2020-03-05 | End: 2020-05-08

## 2020-03-10 ENCOUNTER — ALLIED HEALTH/NURSE VISIT (OUTPATIENT)
Dept: FAMILY MEDICINE | Facility: CLINIC | Age: 60
End: 2020-03-10
Payer: COMMERCIAL

## 2020-03-10 VITALS — SYSTOLIC BLOOD PRESSURE: 130 MMHG | DIASTOLIC BLOOD PRESSURE: 78 MMHG

## 2020-03-10 DIAGNOSIS — I10 HYPERTENSION GOAL BP (BLOOD PRESSURE) < 130/80: Primary | ICD-10-CM

## 2020-03-10 NOTE — PROGRESS NOTES
Estevan Aaron was evaluated at [facility] on March 10, 2020 at which time his blood pressure was:    BP Readings from Last 3 Encounters:   03/10/20 130/88   11/20/19 (!) 148/84   04/03/19 132/75     Pulse Readings from Last 3 Encounters:   03/14/19 82   01/05/18 84   01/09/17 105       Reviewed lifestyle modifications for blood pressure control and reduction: including making healthy food choices, managing weight, getting regular exercise, smoking cessation, reducing alcohol consumption, monitoring blood pressure regularly.     Symptoms: None    BP Goal:Other: <130/80    BP Assessment:  BP too high    Potential Reasons for BP too high: Tobacco use within past 30 minutes    BP Follow-Up Plan: recheck 6 months    Recommendation to Provider: recheck 6 months.  Pt will continue to reduce sodium and reduce nicotine use.      Note completed by: Thank you,  Laila Steel RPh, r [facility] [place] [phone]

## 2020-03-16 ENCOUNTER — OFFICE VISIT (OUTPATIENT)
Dept: FAMILY MEDICINE | Facility: CLINIC | Age: 60
End: 2020-03-16
Payer: COMMERCIAL

## 2020-03-16 ENCOUNTER — TELEPHONE (OUTPATIENT)
Dept: FAMILY MEDICINE | Facility: CLINIC | Age: 60
End: 2020-03-16

## 2020-03-16 VITALS
TEMPERATURE: 98.4 F | HEART RATE: 68 BPM | WEIGHT: 208 LBS | SYSTOLIC BLOOD PRESSURE: 157 MMHG | DIASTOLIC BLOOD PRESSURE: 89 MMHG | BODY MASS INDEX: 31.63 KG/M2 | OXYGEN SATURATION: 100 %

## 2020-03-16 DIAGNOSIS — S61.210A LACERATION OF RIGHT INDEX FINGER, FOREIGN BODY PRESENCE UNSPECIFIED, NAIL DAMAGE STATUS UNSPECIFIED, INITIAL ENCOUNTER: Primary | ICD-10-CM

## 2020-03-16 DIAGNOSIS — S61.209A OPEN WOUND OF FINGER, INITIAL ENCOUNTER: ICD-10-CM

## 2020-03-16 DIAGNOSIS — Z12.11 SCREEN FOR COLON CANCER: ICD-10-CM

## 2020-03-16 PROCEDURE — 12001 RPR S/N/AX/GEN/TRNK 2.5CM/<: CPT | Performed by: FAMILY MEDICINE

## 2020-03-16 RX ORDER — CEPHALEXIN 500 MG/1
500 CAPSULE ORAL 2 TIMES DAILY
Qty: 14 CAPSULE | Refills: 0 | Status: SHIPPED | OUTPATIENT
Start: 2020-03-16 | End: 2020-03-23

## 2020-03-16 NOTE — PROGRESS NOTES
"Subjective   Estevan Aaron is a 59 year old male who presents to clinic today for the following health issues:    HPI   Stated he was cutting a filter off, took his gloves off and knife out to cut a zip tie. Cut his finger on a very sharp piece of metal.   He is having a hard time getting it to stop bleeding.     Laceration - Right Index Finger    Onset: today @ 10:45    Description:   Location: R Index Finger  Character: Dull ache    Intensity: 5/10    Progression of Symptoms: same, constant    Accompanying Signs & Symptoms:  Other symptoms:     History:   Previous similar pain: no       Precipitating factors:   Trauma or overuse: YES    Alleviating factors:  Improved by: nothing     Therapies Tried and outcome: Nothing     DENIES: radiation of pain, numbness, tingling, weakness, warmth, swelling, redness, discoloration, cough, SOB, Fever, or travel    Reviewed and updated as needed this visit by provider:    Review of Systems   Constitutional, HEENT, cardiovascular, pulmonary, GI, , musculoskeletal, neuro, skin, endocrine and psych systems are negative, except as otherwise noted.    This document serves as a record of the services and decisions personally performed and made by Estevan Blair MD. It was created on his behalf by Mal Stevens, a trained medical scribe. The creation of this document is based the provider's statements to the medical scribe.  Scribe Mal Stevens 12:51 PM, March 16, 2020        Objective   BP (!) 157/89   Pulse 68   Temp 98.4  F (36.9  C)   Wt 94.3 kg (208 lb)   SpO2 100%   BMI 31.63 kg/m   Body mass index is 31.63 kg/m .  Physical Exam   GENERAL: healthy, alert, well nourished, well hydrated, no distress  EYES: Eyes grossly normal to inspection, extraocular movements - intact, and PERRL  MS: extremities- no gross deformities noted, no edema  SKIN: right second finger proximal 2 cm \"U\" shaped laceration, light touch sensation intact distally, full active extention and flexion - no " tendon laceration, otherwise, no suspicious lesions, no rashes  NEURO: strength and tone- normal, sensory exam- grossly normal, mentation- intact, speech- normal, reflexes- symmetric  PSYCH: Alert and oriented times 3; speech- coherent , normal rate and volume; able to articulate logical thoughts, able to abstract reason, no tangential thoughts, no hallucinations or delusions, affect- normal        Assessment & Plan   Estevan was seen today for laceration.    Diagnoses and all orders for this visit:    Laceration of right index finger, no foreign body , nail damage status unspecified, initial encounter - cut today from a piece of metal. Self-employed - See procedure note. Sutured today.   -     cephALEXin (KEFLEX) 500 MG capsule; Take 1 capsule (500 mg) by mouth 2 times daily for 7 days    Procedure Note:  After informed consent was obtained, using Hibiclens for cleansing and 1% Lidocaine without epinephrine for anesthetic, with sterile technique, suturing was performed. Antibiotic dressing applied and wound sutured with 5-O Ethilon with skin edges approximated - total of 8. Wound care instructions provided. Be alert for any signs of cutaneous infection. The procedure was well tolerated without complications. Follow up: Return for suture removal in 10 days.    Screen for colon cancer  -     Fecal colorectal cancer screen (FIT); Future      Tobacco Cessation:  Quit smoking !    See Patient Instructions    Return in about 10 days (around 3/26/2020) for Suture Removal.    The information in this document, created by the medical scribe for me, accurately reflects the services I personally performed and the decisions made by me. I have reviewed and approved this document for accuracy prior to leaving the patient care area.  1:35 PM, 03/16/20        Chance Blair MD      [address] [address]  [email or web address]   Office: [phone]  Pager:  [phone]

## 2020-03-16 NOTE — TELEPHONE ENCOUNTER
Called patient @ # below -     Stated he was cutting a filter off, took his gloves off and knife out to cut a big zip tie. Cut his finger on a very sharp piece of metal.   Finger is still bleeding    Laceration - R Index Finger    Onset: 3/16/20 @ 1045    Description:   Location: R Index Finger  Character: Dull ache    Intensity: 5/10    Progression of Symptoms: same, constant    Accompanying Signs & Symptoms:  Other symptoms:     History:   Previous similar pain: no       Precipitating factors:   Trauma or overuse: YES    Alleviating factors:  Improved by: nothing    Therapies Tried and outcome: Nothing    DENIES: radiation of pain, numbness, tingling, weakness, warmth, swelling, redness, discoloration, cough, SOB, Fever, or travel    Immunization History   Administered Date(s) Administered     Influenza Vaccine IM > 6 months Valent IIV4 12/31/2013     TDAP Vaccine (Boostrix) 02/26/2016     Advised OV -   Next 5 appointments (look out 90 days)    Mar 16, 2020 12:40 PM CDT  [facility] Blair [facility] [address] [place] [id number] [phone]          Amalia Horne RN  [facility]

## 2020-03-16 NOTE — TELEPHONE ENCOUNTER
Reason for call:  Patient reporting a symptom    Symptom or request: Patient has a cut finger & needs stitches. Stephy RN said she couldn't take the call & said our nurses have to talk to him & huddle with our Dr's to see where we can put him.    Duration (how long have symptoms been present): Today    Have you been treated for this before? No    Additional comments: Please call & triage him right away    Phone Number patient can be reached at:  Cell number on file:    Telephone Information:   Mobile [phone]       Best Time:  now    Can we leave a detailed message on this number:  YES    Call taken on 3/16/2020 at 12:00 PM by Irma Sheehan

## 2020-03-27 ENCOUNTER — ALLIED HEALTH/NURSE VISIT (OUTPATIENT)
Dept: NURSING | Facility: CLINIC | Age: 60
End: 2020-03-27
Payer: COMMERCIAL

## 2020-03-27 DIAGNOSIS — Z48.02 VISIT FOR SUTURE REMOVAL: Primary | ICD-10-CM

## 2020-03-27 NOTE — PROGRESS NOTES
Suture removal:     Date sutures applied: 3/16/20         Where (setting) in which they applied:Clinic    Description:  Type: sutures x 8  Location: R Index Finger    History:    Cause of laceration: metal    Accompanying Signs & Symptoms: (staff: if yes-describe)  Redness: no  Warmth: no  Drainage: no  Still bleeding: no  Fevers: no    Last tetanus shot: last tetanus booster within 10 years        Amalia Horne RN  [facility]

## 2020-03-31 DIAGNOSIS — E78.5 HYPERLIPIDEMIA LDL GOAL <130: ICD-10-CM

## 2020-03-31 DIAGNOSIS — I10 HYPERTENSION GOAL BP (BLOOD PRESSURE) < 130/80: ICD-10-CM

## 2020-03-31 NOTE — LETTER
[address] [id number] [phone]       April 6, 2020    Estevan T Page  [address] [place] [address]      We have been calling you regarding a recent refill request we received for Atorvastatin and Lisinopril.  Unfortunately, we were unable to reach you.  We are notifying you that you are due for a telephone or video visit prior to your next refill.  You can schedule this appointment by calling the clinic at [phone].          Sincerely,          Chance Blair M.D.

## 2020-03-31 NOTE — TELEPHONE ENCOUNTER
"ATORVASTATIN CALCIUM 10MG TABS   Last Written Prescription Date:  3/14/2019  Last Fill Quantity: 90,  # refills: 3   Last office visit: 3/16/2020 with prescribing provider:  Estevan Blair MD   Future Office Visit:      LISINOPRIL 20MG TABS   Last Written Prescription Date:  3/14/2019  Last Fill Quantity: 90,  # refills: 3   Last office visit: 3/16/2020 with prescribing provider:  Estevan Blair MD   Future Office Visit:        Requested Prescriptions   Pending Prescriptions Disp Refills     atorvastatin (LIPITOR) 10 MG tablet [Pharmacy Med Name: ATORVASTATIN CALCIUM 10MG TABS] 90 tablet 3     Sig: TAKE ONE TABLET BY MOUTH DAILY       Statins Protocol Failed - 3/31/2020  5:01 AM        Failed - LDL on file in past 12 months     Recent Labs   Lab Test 03/14/19  1239   *             Passed - No abnormal creatine kinase in past 12 months     No lab results found.             Passed - Recent (12 mo) or future (30 days) visit within the authorizing provider's specialty     Patient has had an office visit with the authorizing provider or a provider within the authorizing providers department within the previous 12 mos or has a future within next 30 days. See \"Patient Info\" tab in inbasket, or \"Choose Columns\" in Meds & Orders section of the refill encounter.              Passed - Medication is active on med list        Passed - Patient is age 18 or older           lisinopril (ZESTRIL) 20 MG tablet [Pharmacy Med Name: LISINOPRIL 20MG TABS] 90 tablet 3     Sig: TAKE ONE TABLET BY MOUTH DAILY       ACE Inhibitors (Including Combos) Protocol Failed - 3/31/2020  5:01 AM        Failed - Blood pressure under 140/90 in past 12 months     BP Readings from Last 3 Encounters:   03/16/20 (!) 157/89   03/10/20 130/78   11/20/19 (!) 148/84                 Passed - Recent (12 mo) or future (30 days) visit within the authorizing provider's specialty     Patient has had an office visit with the authorizing provider or a " "provider within the authorizing providers department within the previous 12 mos or has a future within next 30 days. See \"Patient Info\" tab in inbasket, or \"Choose Columns\" in Meds & Orders section of the refill encounter.              Passed - Medication is active on med list        Passed - Patient is age 18 or older        Passed - Normal serum creatinine on file in past 12 months     Recent Labs   Lab Test 04/03/19  1058   CR 0.86       Ok to refill medication if creatinine is low          Passed - Normal serum potassium on file in past 12 months     Recent Labs   Lab Test 04/03/19  1058   POTASSIUM 4.2                  "

## 2020-04-02 RX ORDER — ATORVASTATIN CALCIUM 10 MG/1
TABLET, FILM COATED ORAL
Qty: 30 TABLET | Refills: 0 | Status: SHIPPED | OUTPATIENT
Start: 2020-04-02 | End: 2020-05-08

## 2020-04-02 RX ORDER — LISINOPRIL 20 MG/1
TABLET ORAL
Qty: 30 TABLET | Refills: 0 | Status: SHIPPED | OUTPATIENT
Start: 2020-04-02 | End: 2020-05-08

## 2020-04-02 NOTE — TELEPHONE ENCOUNTER
Attempt #1  Called patient @ [phone] - Left a non-detailed message to call back.    If patient calls back, please schedule a TELEPHONE/VIDEO VISIT (MED CHECK) within 1 month and close encounter.     Amalia Horne RN  [facility]

## 2020-04-06 NOTE — TELEPHONE ENCOUNTER
Left non-detailed message for patient to call back.  Please schedule follow up when patient calls back.  (see previous notes for details)  Letter sent.  Closing encounter.    Yany Albright

## 2020-05-08 ENCOUNTER — VIRTUAL VISIT (OUTPATIENT)
Dept: FAMILY MEDICINE | Facility: CLINIC | Age: 60
End: 2020-05-08
Payer: COMMERCIAL

## 2020-05-08 DIAGNOSIS — I10 HYPERTENSION GOAL BP (BLOOD PRESSURE) < 130/80: ICD-10-CM

## 2020-05-08 DIAGNOSIS — E78.5 HYPERLIPIDEMIA LDL GOAL <130: ICD-10-CM

## 2020-05-08 PROCEDURE — 99214 OFFICE O/P EST MOD 30 MIN: CPT | Mod: 95 | Performed by: FAMILY MEDICINE

## 2020-05-08 RX ORDER — LISINOPRIL 20 MG/1
20 TABLET ORAL DAILY
Qty: 90 TABLET | Refills: 1 | Status: SHIPPED | OUTPATIENT
Start: 2020-05-08 | End: 2020-10-21

## 2020-05-08 RX ORDER — FUROSEMIDE 20 MG
20 TABLET ORAL DAILY
Qty: 90 TABLET | Refills: 1 | Status: SHIPPED | OUTPATIENT
Start: 2020-05-08 | End: 2020-11-13

## 2020-05-08 RX ORDER — ATORVASTATIN CALCIUM 10 MG/1
10 TABLET, FILM COATED ORAL DAILY
Qty: 90 TABLET | Refills: 1 | Status: SHIPPED | OUTPATIENT
Start: 2020-05-08 | End: 2020-10-21

## 2020-05-08 NOTE — PROGRESS NOTES
"Subjective   Estevan Aaron is a 60 year old male who is being evaluated via a billable telephone visit.      The patient has been notified of following:     \"This telephone visit will be conducted via a call between you and your physician/provider. We have found that certain health care needs can be provided without the need for a physical exam.  This service lets us provide the care you need with a short phone conversation.  If a prescription is necessary we can send it directly to your pharmacy.  If lab work is needed we can place an order for that and you can then stop by our lab to have the test done at a later time.    Telephone visits are billed at different rates depending on your insurance coverage. During this emergency period, for some insurers they may be billed the same as an in-person visit.  Please reach out to your insurance provider with any questions.    If during the course of the call the physician/provider feels a telephone visit is not appropriate, you will not be charged for this service.\"     Patient has given verbal consent for Telephone visit?  {YES-NO  Default Yes:4444::\"Yes\"}    How would you like to obtain your AVS? {AVS Preference:206107}    Telephone visit Start Time: {telephone visit start time for provider to select:827159}    Estevan Aaron is a 60 year old male who presents today for the following health issues:    Chief Complaint  No chief complaint on file.       Hypertension Follow-up      Do you check your blood pressure regularly outside of the clinic? { :653421}     Are you following a low salt diet? { :769240}    Are your blood pressures ever more than 140 on the top number (systolic) OR more   than 90 on the bottom number (diastolic), for example 140/90? { :475216}      How many servings of fruits and vegetables do you eat daily?  { :533101}    On average, how many sweetened beverages do you drink each day (Examples: soda, juice, sweet tea, etc.  Do NOT count diet or " "artificially sweetened beverages)?   { 1-11:539497}    How many days per week do you exercise enough to make your heart beat faster? { :826195}    How many minutes a day do you exercise enough to make your heart beat faster? { :828096}    How many days per week do you miss taking your medication? {0-7 :944759}    {PEDS Chronic and Acute Problems (Optional):357175}     Reviewed and updated as needed this visit by Provider         ROS: Constitutional, HEENT, cardiovascular, pulmonary, GI, , musculoskeletal, neuro, skin, endocrine and psych systems are negative, except as otherwise noted.       Objective   Reported vitals:  There were no vitals taken for this visit.   Gen: healthy, alert, and no distress  Psych: Alert and oriented times 3; coherent speech, normal   rate and volume, able to articulate logical thoughts, able   to abstract reason, no tangential thoughts, no hallucinations   or delusions.  His affect is normal.    {Diagnostic Test Results (Optional):961896::\"Diagnostic Test Results:\",\"Labs reviewed in Epic\"}        Assessment/Plan:  There are no diagnoses linked to this encounter.    No follow-ups on file.    Telephone visit Stop Time: {telephone visit stop time for provider to select:911117}  Phone call duration:  *** minutes        Chance Blair MD     [address] [address]  [email or web address]   Office: [phone]  Pager: [phone]     "

## 2020-05-08 NOTE — PROGRESS NOTES
"Subjective   Estevan Aaron is a 60 year old male who is being evaluated via a billable telephone visit.      The patient has been notified of following:     \"This telephone visit will be conducted via a call between you and your physician/provider. We have found that certain health care needs can be provided without the need for a physical exam.  This service lets us provide the care you need with a short phone conversation.  If a prescription is necessary we can send it directly to your pharmacy.  If lab work is needed we can place an order for that and you can then stop by our lab to have the test done at a later time.    Telephone visits are billed at different rates depending on your insurance coverage. During this emergency period, for some insurers they may be billed the same as an in-person visit.  Please reach out to your insurance provider with any questions.    If during the course of the call the physician/provider feels a telephone visit is not appropriate, you will not be charged for this service.\"     Patient has given verbal consent for Telephone visit?  Yes    How would you like to obtain your AVS? Mail a copy    Telephone visit Start Time: 4:28    Estevan Aaron is a 60 year old male who presents today for the following health issues:    Chief Complaint  Patient presents with:  Medication Problem       Hyperlipidemia Follow-Up      Are you regularly taking any medication or supplement to lower your cholesterol?   Yes- Lipitor    Are you having muscle aches or other side effects that you think could be caused by your cholesterol lowering medication?  No    Hypertension Follow-up      Do you check your blood pressure regularly outside of the clinic? No     Are you following a low salt diet? Yes    Are your blood pressures ever more than 140 on the top number (systolic) OR more   than 90 on the bottom number (diastolic), for example 140/90? No         Reviewed and updated as needed this visit by " Provider  Tobacco  Allergies  Meds  Problems  Med Hx  Surg Hx  Fam Hx         ROS: Constitutional, HEENT, cardiovascular, pulmonary, GI, , musculoskeletal, neuro, skin, endocrine and psych systems are negative, except as otherwise noted.       Objective   Reported vitals:  There were no vitals taken for this visit.   Gen: healthy, alert, and no distress  Psych: Alert and oriented times 3; coherent speech, normal   rate and volume, able to articulate logical thoughts, able   to abstract reason, no tangential thoughts, no hallucinations   or delusions.  His affect is normal.    Diagnostic Test Results:  Labs reviewed in Epic        Assessment/Plan:  Estevan was seen today for medication problem.    Diagnoses and all orders for this visit:    Hypertension goal BP (blood pressure) < 130/80 - high recently and due for recheck   Continue medications   -     lisinopril (ZESTRIL) 20 MG tablet; Take 1 tablet (20 mg) by mouth daily  -     furosemide (LASIX) 20 MG tablet; Take 1 tablet (20 mg) by mouth daily    Hyperlipidemia LDL goal <130 - controlled - continue medication.   -     atorvastatin (LIPITOR) 10 MG tablet; Take 1 tablet (10 mg) by mouth daily        Return for Wellness Exam and fasting labs and Blood Pressure Recheck in 1-2 weeks.    Telephone visit Stop Time: 4:33 PM  Phone call duration:  5 minutes        Chance Blair MD     [address] [address]  [email or web address]  [email or web address]   Office: [phone]  Pager: [phone]

## 2020-05-21 ENCOUNTER — TELEPHONE (OUTPATIENT)
Dept: FAMILY MEDICINE | Facility: CLINIC | Age: 60
End: 2020-05-21

## 2020-05-21 DIAGNOSIS — K05.10 SUPERFICIAL INJURY OF GINGIVA WITH INFECTION, INITIAL ENCOUNTER: Primary | ICD-10-CM

## 2020-05-21 DIAGNOSIS — S00.502A SUPERFICIAL INJURY OF GINGIVA WITH INFECTION, INITIAL ENCOUNTER: Primary | ICD-10-CM

## 2020-05-21 RX ORDER — AMOXICILLIN 875 MG
875 TABLET ORAL 2 TIMES DAILY
Qty: 14 TABLET | Refills: 0 | Status: SHIPPED | OUTPATIENT
Start: 2020-05-21 | End: 2020-05-28

## 2020-05-21 NOTE — TELEPHONE ENCOUNTER
Patient called again and is requesting an antibiotic for a possible gum infection in the upper right molar/wisdom tooth area. Patient feels his gum is infected but that he did get the chip out of the gum but continues to have pain.  Patient states he has been gargling with warm salt water which helps provider relief for only a short time.    Patient states he cannot afford to go to the dentist and doesn't want to be seen in clinic and is requesting and antibiotic.      Denies fever or drainage from site, edema in the face.    Routing to provider for review and advise as appropriate.  Pharmacy is pended.    CARO RandallN, RN  [employer] [facility]

## 2020-05-21 NOTE — TELEPHONE ENCOUNTER
Reason for call:  Patient reporting a symptom    Symptom or request:pt a week ago ate chip and cut gums back by wisdom tooth, feels it infected.     Duration (how long have symptoms been present): one week doesn't know if chip is still there    Have you been treated for this before? No    Additional comments: Patient is requesting antibotics doesn't want to come in    Phone Number patient can be reached at:  Home number on file [phone] (home)    Best Time:  anytime    Can we leave a detailed message on this number:  NO    Call taken on 5/21/2020 at 11:06 AM by Rebeca Bonner

## 2020-06-26 ENCOUNTER — TRANSFERRED RECORDS (OUTPATIENT)
Dept: HEALTH INFORMATION MANAGEMENT | Facility: CLINIC | Age: 60
End: 2020-06-26

## 2020-08-11 NOTE — TELEPHONE ENCOUNTER
Failed protocol    Routing refill request to provider for review/approval because:  Labs not current:  Potassium and Creatinine - last done Oct 2015    Sandy Upton RN               normal...

## 2020-09-18 ENCOUNTER — ALLIED HEALTH/NURSE VISIT (OUTPATIENT)
Dept: FAMILY MEDICINE | Facility: CLINIC | Age: 60
End: 2020-09-18

## 2020-09-18 VITALS — DIASTOLIC BLOOD PRESSURE: 78 MMHG | SYSTOLIC BLOOD PRESSURE: 146 MMHG

## 2020-09-18 DIAGNOSIS — I10 HYPERTENSION GOAL BP (BLOOD PRESSURE) < 130/80: Primary | ICD-10-CM

## 2020-09-18 PROCEDURE — 99207 ZZC NO CHARGE NURSE ONLY: CPT | Performed by: FAMILY MEDICINE

## 2020-09-18 NOTE — PROGRESS NOTES
Estevan Aaron was evaluated at [facility] on September 18, 2020 at which time his blood pressure was:    BP Readings from Last 3 Encounters:   09/18/20 (!) 146/78   03/16/20 (!) 157/89   03/10/20 130/78     Pulse Readings from Last 3 Encounters:   03/16/20 68   03/14/19 82   01/05/18 84       Reviewed lifestyle modifications for blood pressure control and reduction: including making healthy food choices, managing weight, getting regular exercise, smoking cessation, reducing alcohol consumption, monitoring blood pressure regularly.     Symptoms: None    BP Goal:< 140/90 mmHg    BP Assessment:  BP too high    Potential Reasons for BP too high: Dose of BP medication too low    BP Follow-Up Plan: Recheck BP in 30 days at pharmacy    Recommendation to Provider: Pt reports that he still is drinking 1 pot coffee per day and still smoking.  We discussed reducing this by small amounts over the course of the next several weeks/months.  He agreed to come in again in the afternoon without smoking and see what his bp is.      Note completed by: Thank you,  Laila Steel [facility], Mgr [facility] [place] [phone]

## 2020-10-21 DIAGNOSIS — E78.5 HYPERLIPIDEMIA LDL GOAL <130: ICD-10-CM

## 2020-10-21 DIAGNOSIS — I10 HYPERTENSION GOAL BP (BLOOD PRESSURE) < 130/80: ICD-10-CM

## 2020-10-21 RX ORDER — LISINOPRIL 20 MG/1
TABLET ORAL
Qty: 90 TABLET | Refills: 0 | Status: SHIPPED | OUTPATIENT
Start: 2020-10-21 | End: 2021-01-13

## 2020-10-21 RX ORDER — ATORVASTATIN CALCIUM 10 MG/1
TABLET, FILM COATED ORAL
Qty: 90 TABLET | Refills: 0 | Status: SHIPPED | OUTPATIENT
Start: 2020-10-21 | End: 2021-01-13

## 2020-10-21 NOTE — TELEPHONE ENCOUNTER
Medication recheck appointment is due please schedule with.  1 refill sent for now.    Last visit in this dept:    5/8/2020     Next visit in this dept:       Health Maintenance   Topic Date Due     COLORECTAL CANCER SCREENING  04/22/1969     ZOSTER IMMUNIZATION (1 of 2) 04/22/2009     PREVENTIVE CARE VISIT  03/14/2020     LIPID  03/14/2020     MICROALBUMIN  03/14/2020     PSA  03/14/2020     BMP  04/03/2020     INFLUENZA VACCINE (1) 09/01/2020     ADVANCE CARE PLANNING  01/05/2023     DTAP/TDAP/TD IMMUNIZATION (2 - Td) 02/26/2026     HEPATITIS C SCREENING  Completed     PHQ-2  Completed     HIV SCREENING  Addressed     Pneumococcal Vaccine: Pediatrics (0 to 5 Years) and At-Risk Patients (6 to 64 Years)  Aged Out     IPV IMMUNIZATION  Aged Out     MENINGITIS IMMUNIZATION  Aged Out     HEPATITIS B IMMUNIZATION  Aged Out

## 2020-10-21 NOTE — LETTER
[address] [address] [phone]       November 2, 2020    Estevan T Page  [address] [place] [address]    Santos:    We have been calling you regarding a recent refill request we received for liptor and lisinopril.  Unfortunately, we were unable to reach you.  We are notifying you that you are due for an office visit with labs  prior to your next refill.  You can schedule this appointment via [employer] or by calling the clinic at [phone].        Sincerely,          Chance Blair M.D./[email or web address]

## 2020-10-21 NOTE — TELEPHONE ENCOUNTER
Left non-detailed message for patient to call back.  Please schedule office visit/med check  when patient calls back.  (see previous notes for details)    Thanks Mariaelena

## 2020-10-21 NOTE — TELEPHONE ENCOUNTER
Routing refill request to provider for review/approval because:  Labs out of range:  Bp  Labs not current:  LDL, creat, K+    BP Readings from Last 3 Encounters:   09/18/20 (!) 146/78   03/16/20 (!) 157/89   03/10/20 130/78

## 2020-11-02 ENCOUNTER — NURSE TRIAGE (OUTPATIENT)
Dept: FAMILY MEDICINE | Facility: CLINIC | Age: 60
End: 2020-11-02

## 2020-11-02 NOTE — TELEPHONE ENCOUNTER
"Pt stated he was feeling not well this morning. Pt noted he was driving to the gas station when symptoms started, Pt stated may have CO poisoning. Pt stated he has a ruy van. Pt also noted he recently had a new furnace installed. Pt does have new CO detectors.   Pt stated legs felt cold, Pt stated is was cold out this morning.     Pt stated he felt dizzy. Pt stated possible blurred vision. Pt stated vision is normal as of writing.    Pt denies HA, CP, SOB, N/V, numbness or tingling.    Pt stated he is a current smoker and has BP issues. Pt advised need to check blood gases, unable to do so in clinic and would need to be done in ER. Pt advised ER visit if CO poisoning is suspected. Pt declined stating he is not going to ER. Pt does want an appointment in clinic for workup of BP and dizziness.     Pt stated dizziness actually started this morning upon awakening prior to riding in [employer] Pt stated no other family members have c/o similar symptoms or symptoms of CO poisoning.    Reason for Disposition    [1] SUSPECTED CO EXPOSURE (e.g., CO alarm sounded) AND [2] asymptomatic now (no CO poisoning symptoms)    Answer Assessment - Initial Assessment Questions  1. SYMPTOMS: \"What symptoms are you having?\" (e.g., none, headache, dizziness, nausea)      dizziness  2. ONSET:  \"When did the symptoms begin?\"      Today upon waking  3. SOURCE OF EXPOSURE: \"What happened?\" (e.g., broken furnace, home fire)      Ruy van  4. CO EXPOSURE:  \"Were you exposed to carbon monoxide?\"     - KNOWN - high CO measured in air; coworker or family member diagnosed with CO poisoning;      - SUSPECTED - CO alarm sounded; exposure to car fumes, burning charcoal, burning kerosene, or other gas burning device.      Suspected today  5. OTHERS: \"Is anyone else having similar/same symptoms?\"       No one else in home is experiencing symptoms  6. TREATMENT: \"What have you done so far to treat this?\" (e.g., open the windows, go outside)      " "rest  7. PREGNANCY: \"Is there any chance you are pregnant?\" \"When was your last menstrual period?\"      No    Protocols used: CARBON MONOXIDE EXPOSURE-BLAINE-SHANA MIRANDA RN   [facility]      "

## 2020-11-03 ENCOUNTER — OFFICE VISIT (OUTPATIENT)
Dept: FAMILY MEDICINE | Facility: CLINIC | Age: 60
End: 2020-11-03
Payer: COMMERCIAL

## 2020-11-03 VITALS
WEIGHT: 201 LBS | HEIGHT: 68 IN | OXYGEN SATURATION: 99 % | SYSTOLIC BLOOD PRESSURE: 130 MMHG | TEMPERATURE: 96.5 F | DIASTOLIC BLOOD PRESSURE: 80 MMHG | HEART RATE: 76 BPM | BODY MASS INDEX: 30.46 KG/M2

## 2020-11-03 DIAGNOSIS — R42 LIGHTHEADEDNESS: Primary | ICD-10-CM

## 2020-11-03 DIAGNOSIS — F17.200 TOBACCO USE DISORDER: ICD-10-CM

## 2020-11-03 DIAGNOSIS — D56.9 THALASSEMIA, UNSPECIFIED TYPE: ICD-10-CM

## 2020-11-03 DIAGNOSIS — E21.3 HYPERPARATHYROIDISM (H): ICD-10-CM

## 2020-11-03 LAB
ANION GAP SERPL CALCULATED.3IONS-SCNC: 6 MMOL/L (ref 3–14)
BUN SERPL-MCNC: 13 MG/DL (ref 7–30)
CALCIUM SERPL-MCNC: 11.4 MG/DL (ref 8.5–10.1)
CHLORIDE SERPL-SCNC: 107 MMOL/L (ref 94–109)
CO2 SERPL-SCNC: 23 MMOL/L (ref 20–32)
CREAT SERPL-MCNC: 0.94 MG/DL (ref 0.66–1.25)
ERYTHROCYTE [DISTWIDTH] IN BLOOD BY AUTOMATED COUNT: 19.5 % (ref 10–15)
GFR SERPL CREATININE-BSD FRML MDRD: 86 ML/MIN/{1.73_M2}
GLUCOSE SERPL-MCNC: 95 MG/DL (ref 70–99)
HCT VFR BLD AUTO: 48.2 % (ref 40–53)
HGB BLD-MCNC: 16.1 G/DL (ref 13.3–17.7)
MCH RBC QN AUTO: 20 PG (ref 26.5–33)
MCHC RBC AUTO-ENTMCNC: 33.4 G/DL (ref 31.5–36.5)
MCV RBC AUTO: 60 FL (ref 78–100)
PLATELET # BLD AUTO: 244 10E9/L (ref 150–450)
POTASSIUM SERPL-SCNC: 4.5 MMOL/L (ref 3.4–5.3)
RBC # BLD AUTO: 8.03 10E12/L (ref 4.4–5.9)
SODIUM SERPL-SCNC: 136 MMOL/L (ref 133–144)
WBC # BLD AUTO: 9.4 10E9/L (ref 4–11)

## 2020-11-03 PROCEDURE — 85027 COMPLETE CBC AUTOMATED: CPT | Performed by: FAMILY MEDICINE

## 2020-11-03 PROCEDURE — 80048 BASIC METABOLIC PNL TOTAL CA: CPT | Performed by: FAMILY MEDICINE

## 2020-11-03 PROCEDURE — 36415 COLL VENOUS BLD VENIPUNCTURE: CPT | Performed by: FAMILY MEDICINE

## 2020-11-03 PROCEDURE — 99214 OFFICE O/P EST MOD 30 MIN: CPT | Performed by: FAMILY MEDICINE

## 2020-11-03 PROCEDURE — 93000 ELECTROCARDIOGRAM COMPLETE: CPT | Performed by: FAMILY MEDICINE

## 2020-11-03 ASSESSMENT — MIFFLIN-ST. JEOR: SCORE: 1691.23

## 2020-11-03 NOTE — PROGRESS NOTES
"Assessment & Plan   Lightheadedness  EKG is okay.  Blood pressure okay as well.  Unclear if orthostatic versus otherwise.  No palpitations or chest pains.  Feels okay at the moment.  Recommended hydration.  Recheck if worsening  - EKG 12-lead complete w/read - Clinics  - CBC with platelets  - Basic metabolic panel  (Ca, Cl, CO2, Creat, Gluc, K, Na, BUN)    Hyperparathyroidism (H)  Mild hypercalcemia declines further work-up at this time  - Basic metabolic panel  (Ca, Cl, CO2, Creat, Gluc, K, Na, BUN)    Thalassemia, unspecified type  Family history and ongoing microcytic anemia    Tobacco use disorder  Continue smoking recommend quitting       Decrease caffeine intake  BMI:   Estimated body mass index is 30.56 kg/m  as calculated from the following:    Height as of this encounter: 1.727 m (5' 8\").    Weight as of this encounter: 91.2 kg (201 lb).   Weight management plan: Discussed healthy diet and exercise guidelines          Return in about 2 weeks (around 11/17/2020) for symptoms failing to improve or sooner if worsening.      Chance Blair MD      [address] [address]  [email or web address]   Office: [phone]  Pager: [phone]         Subjective   Estevan Aaron is a 60 year old male who presents to clinic today for the following health issues:    HPI     Dizziness  Noted yesterday morning. Has 2 pots of coffee the day before.  ? Palpitations. No associated chest pain.  Symptoms in the morning and he had not eaten yet - just coffee.  No melena or heartburn.     Triage note from 11/2/2020:   Pt stated he was feeling not well this morning. Pt noted he was driving to the gas station when symptoms started, Pt stated may have CO poisoning. Pt stated he has a jason van. Pt also noted he recently had a new furnace installed. Pt does have new CO detectors.   Pt stated legs felt cold, Pt stated is was cold out this morning.      Pt stated " "he felt dizzy. Pt stated possible blurred vision. Pt stated vision is normal as of writing.     Pt denies HA, CP, SOB, N/V, numbness or tingling.     Pt stated he is a current smoker and has BP issues. Pt advised need to check blood gases, unable to do so in clinic and would need to be done in ER. Pt advised ER visit if CO poisoning is suspected. Pt declined stating he is not going to ER. Pt does want an appointment in clinic for workup of BP and dizziness.      Pt stated dizziness actually started this morning upon awakening prior to riding in [employer]. Pt stated no other family members have c/o similar symptoms or symptoms of CO poisoning.       Reviewed and updated as needed this visit by provider:  Tobacco  Allergies  Meds  Problems  Med Hx  Surg Hx  Fam Hx          Review of Systems   Constitutional, HEENT, cardiovascular, pulmonary, GI, , musculoskeletal, neuro, skin, endocrine and psych systems are negative, except as otherwise noted.        Objective   /80   Pulse 76   Temp 96.5  F (35.8  C) (Tympanic)   Ht 1.727 m (5' 8\")   Wt 91.2 kg (201 lb)   SpO2 99%   BMI 30.56 kg/m   Body mass index is 30.56 kg/m .  Physical Exam   GENERAL: healthy, alert, well nourished, well hydrated, no distress  HENT: ear canals- normal; TMs- normal; Nose- normal; Mouth- no ulcers, no lesions  NECK: no tenderness, no adenopathy, no asymmetry, no masses, no stiffness; thyroid- normal to palpation  RESP: lungs clear to auscultation - no rales, no rhonchi, no wheezes  CV: regular rates and rhythm, normal S1 S2, no S3 or S4 and no murmur, no click or rub -  ABDOMEN: soft, no tenderness, no  hepatosplenomegaly, no masses, normal bowel sounds  MS: extremities- no gross deformities noted, no edema  SKIN: no suspicious lesions, no rashes  NEURO: strength and tone- normal, sensory exam- grossly normal, mentation- intact, speech- normal, reflexes- symmetric  BACK: no CVA tenderness, right> left  paralumbar tenderness " negative straight leg raising   PSYCH: Alert and oriented times 3; speech- coherent , normal rate and volume; able to articulate logical thoughts, able to abstract reason, no tangential thoughts, no hallucinations or delusions, affect- normal    Diagnostic Test Results  Results for orders placed or performed in visit on 11/03/20 (from the past 24 hour(s))   CBC with platelets   Result Value Ref Range    WBC 9.4 4.0 - 11.0 10e9/L    RBC Count 8.03 (H) 4.4 - 5.9 10e12/L    Hemoglobin 16.1 13.3 - 17.7 g/dL    Hematocrit 48.2 40.0 - 53.0 %    MCV 60 (L) 78 - 100 fl    MCH 20.0 (L) 26.5 - 33.0 pg    MCHC 33.4 31.5 - 36.5 g/dL    RDW 19.5 (H) 10.0 - 15.0 %    Platelet Count 244 150 - 450 10e9/L     EKG - appears normal, NSR, normal axis, normal intervals, no acute ST/T changes c/w ischemia, no LVH by voltage criteria, unchanged from previous tracings

## 2020-11-03 NOTE — LETTER
[facility]  [address] [place] [address]  [phone]                    November 4, 2020    Estevan PRABHAKAR Page  [address] [place] [address]      Dear Estevan,    Here is a summary of your recent test results:    ***    Your test results are enclosed.      Please contact me if you have any questions.    In addition, here is a list of due or overdue Health Maintenance reminders.    Health Maintenance Due   Topic Date Due     Pneumococcal Vaccine (1 of 3 - PCV13) 04/22/1965     Colorectal Cancer Screening  04/22/1969     Zoster (Shingles) Vaccine (1 of 2) 04/22/2009     Preventive Care Visit  03/14/2020     Cholesterol Lab  03/14/2020     Kidney Microalbumin Urine Test  03/14/2020     Prostate Test  03/14/2020     Flu Vaccine (1) 09/01/2020       Please call us at [phone] (or use [employer]) to address the above recommendations.            Thank you very much for trusting [facility]..     Healthy regards,    { providers with signatures:769867}        Results for orders placed or performed in visit on 11/03/20   CBC with platelets     Status: Abnormal   Result Value Ref Range    WBC 9.4 4.0 - 11.0 10e9/L    RBC Count 8.03 (H) 4.4 - 5.9 10e12/L    Hemoglobin 16.1 13.3 - 17.7 g/dL    Hematocrit 48.2 40.0 - 53.0 %    MCV 60 (L) 78 - 100 fl    MCH 20.0 (L) 26.5 - 33.0 pg    MCHC 33.4 31.5 - 36.5 g/dL    RDW 19.5 (H) 10.0 - 15.0 %    Platelet Count 244 150 - 450 10e9/L   Basic metabolic panel  (Ca, Cl, CO2, Creat, Gluc, K, Na, BUN)     Status: Abnormal   Result Value Ref Range    Sodium 136 133 - 144 mmol/L    Potassium 4.5 3.4 - 5.3 mmol/L    Chloride 107 94 - 109 mmol/L    Carbon Dioxide 23 20 - 32 mmol/L    Anion Gap 6 3 - 14 mmol/L    Glucose 95 70 - 99 mg/dL    Urea Nitrogen 13 7 - 30 mg/dL    Creatinine 0.94 0.66 - 1.25 mg/dL    GFR Estimate 86 >60 mL/min/[1.73_m2]    GFR Estimate If Black >90 >60 mL/min/[1.73_m2]    Calcium 11.4 (H) 8.5 - 10.1 mg/dL

## 2020-11-03 NOTE — LETTER
[facility]  [address] [place] [address], [place] [address]  [phone]                    November 4, 2020    Estevan PRABHAKAR Page  [address] [place] [address]      Dear Estevan,    Here is a summary of your recent test results:    -Normal red blood cell (hgb) levels, normal white blood cell count and normal platelet levels.   -Kidney function (GFR) is normal.   -Sodium is normal.   -Potassium is normal.   -Calcium is still elevated.     -Glucose (diabetic screening test) is normal.     For additional lab test information, [email or web address] is an excellent reference.     Your test results are enclosed.      Please contact me if you have any questions.    In addition, here is a list of due or overdue Health Maintenance reminders.    Health Maintenance Due   Topic Date Due     Pneumococcal Vaccine (1 of 3 - PCV13) 04/22/1965     Colorectal Cancer Screening  04/22/1969     Zoster (Shingles) Vaccine (1 of 2) 04/22/2009     Preventive Care Visit  03/14/2020     Cholesterol Lab  03/14/2020     Kidney Microalbumin Urine Test  03/14/2020     Prostate Test  03/14/2020     Flu Vaccine (1) 09/01/2020       Please call us at [phone] (or use [employer]) to address the above recommendations.            Thank you very much for trusting [facility]..     Healthy regards,            Chance Blair M.D.        Results for orders placed or performed in visit on 11/03/20   CBC with platelets     Status: Abnormal   Result Value Ref Range    WBC 9.4 4.0 - 11.0 10e9/L    RBC Count 8.03 (H) 4.4 - 5.9 10e12/L    Hemoglobin 16.1 13.3 - 17.7 g/dL    Hematocrit 48.2 40.0 - 53.0 %    MCV 60 (L) 78 - 100 fl    MCH 20.0 (L) 26.5 - 33.0 pg    MCHC 33.4 31.5 - 36.5 g/dL    RDW 19.5 (H) 10.0 - 15.0 %    Platelet Count 244 150 - 450 10e9/L   Basic metabolic panel  (Ca, Cl, CO2, Creat, Gluc, K, Na, BUN)     Status: Abnormal   Result Value Ref Range    Sodium 136 133 - 144 mmol/L    Potassium 4.5 3.4 - 5.3  mmol/L    Chloride 107 94 - 109 mmol/L    Carbon Dioxide 23 20 - 32 mmol/L    Anion Gap 6 3 - 14 mmol/L    Glucose 95 70 - 99 mg/dL    Urea Nitrogen 13 7 - 30 mg/dL    Creatinine 0.94 0.66 - 1.25 mg/dL    GFR Estimate 86 >60 mL/min/[1.73_m2]    GFR Estimate If Black >90 >60 mL/min/[1.73_m2]    Calcium 11.4 (H) 8.5 - 10.1 mg/dL

## 2020-11-04 NOTE — RESULT ENCOUNTER NOTE
Note to Staff: please send a result letter    -Normal red blood cell (hgb) levels, normal white blood cell count and normal platelet levels.  -Kidney function (GFR) is normal.  -Sodium is normal.  -Potassium is normal.  -Calcium is still elevated.    -Glucose (diabetic screening test) is normal.     For additional lab test information, [email or web address].[email or web address] is an excellent reference.

## 2020-11-06 ENCOUNTER — TELEPHONE (OUTPATIENT)
Dept: FAMILY MEDICINE | Facility: CLINIC | Age: 60
End: 2020-11-06

## 2020-11-06 NOTE — TELEPHONE ENCOUNTER
Returned call to patient.  Patient states he continues to have dizziness and lightheadedness off and on since appointment.     Patient was tested COVID-19 yesterday and was told he would have his results back in 3-7 days and is wondering if he should have a rapid test now.  Patient had the PCR COVID-19 test, advised patient this is the most sensitive test and will need to wait for the results.    Review LOV notes on 11/3/2020 with patient:Return in about 2 weeks (around 11/17/2020) for symptoms failing to improve or sooner if worsening.    Patient stated understanding and was agreeable with plan.    CARO RandallN, RN  [employer] [facility]

## 2020-11-06 NOTE — TELEPHONE ENCOUNTER
Symptoms    Describe your symptoms: Lightheadedness and dizzy the symptoms that he was seen already for this week with Dr Blair.        How long have you been having symptoms: over a week     Have you been seen for this:  Yes:     Appointment offered?: No    Triage offered?: Yes: okay to call back today          Okay to leave a detailed message? No at Cell number on file:    Telephone Information:   Mobile [phone]

## 2020-11-13 DIAGNOSIS — I10 HYPERTENSION GOAL BP (BLOOD PRESSURE) < 130/80: ICD-10-CM

## 2020-11-13 RX ORDER — FUROSEMIDE 20 MG
TABLET ORAL
Qty: 90 TABLET | Refills: 2 | Status: SHIPPED | OUTPATIENT
Start: 2020-11-13 | End: 2021-01-25

## 2020-11-13 NOTE — TELEPHONE ENCOUNTER
Prescription approved per [facility] Refill Protocol.  Vera Cat RN

## 2020-12-11 ENCOUNTER — ALLIED HEALTH/NURSE VISIT (OUTPATIENT)
Dept: FAMILY MEDICINE | Facility: CLINIC | Age: 60
End: 2020-12-11
Payer: COMMERCIAL

## 2020-12-11 VITALS — SYSTOLIC BLOOD PRESSURE: 122 MMHG | DIASTOLIC BLOOD PRESSURE: 70 MMHG

## 2020-12-11 DIAGNOSIS — I10 HYPERTENSION GOAL BP (BLOOD PRESSURE) < 130/80: Primary | ICD-10-CM

## 2020-12-11 PROCEDURE — 99207 PR NO CHARGE NURSE ONLY: CPT | Performed by: FAMILY MEDICINE

## 2021-01-13 DIAGNOSIS — I10 HYPERTENSION GOAL BP (BLOOD PRESSURE) < 130/80: ICD-10-CM

## 2021-01-13 DIAGNOSIS — E78.5 HYPERLIPIDEMIA LDL GOAL <130: ICD-10-CM

## 2021-01-13 RX ORDER — LISINOPRIL 20 MG/1
TABLET ORAL
Qty: 90 TABLET | Refills: 0 | Status: SHIPPED | OUTPATIENT
Start: 2021-01-13 | End: 2021-01-25

## 2021-01-13 RX ORDER — ATORVASTATIN CALCIUM 10 MG/1
TABLET, FILM COATED ORAL
Qty: 90 TABLET | Refills: 0 | Status: SHIPPED | OUTPATIENT
Start: 2021-01-13 | End: 2021-01-25

## 2021-01-13 NOTE — TELEPHONE ENCOUNTER
Medication is being filled for 1 time refill only due to:  Patient needs to be seen because due for physical.    Amalia Horne RN  [facility]

## 2021-01-25 ENCOUNTER — OFFICE VISIT (OUTPATIENT)
Dept: FAMILY MEDICINE | Facility: CLINIC | Age: 61
End: 2021-01-25
Payer: COMMERCIAL

## 2021-01-25 VITALS
SYSTOLIC BLOOD PRESSURE: 138 MMHG | HEART RATE: 67 BPM | OXYGEN SATURATION: 97 % | HEIGHT: 68 IN | BODY MASS INDEX: 30.01 KG/M2 | DIASTOLIC BLOOD PRESSURE: 80 MMHG | TEMPERATURE: 95.8 F | WEIGHT: 198 LBS

## 2021-01-25 DIAGNOSIS — R00.2 PALPITATIONS: ICD-10-CM

## 2021-01-25 DIAGNOSIS — F41.8 SITUATIONAL ANXIETY: Primary | ICD-10-CM

## 2021-01-25 DIAGNOSIS — I10 HYPERTENSION GOAL BP (BLOOD PRESSURE) < 130/80: ICD-10-CM

## 2021-01-25 DIAGNOSIS — E78.5 HYPERLIPIDEMIA LDL GOAL <130: ICD-10-CM

## 2021-01-25 PROCEDURE — 99214 OFFICE O/P EST MOD 30 MIN: CPT | Performed by: FAMILY MEDICINE

## 2021-01-25 RX ORDER — FUROSEMIDE 20 MG
20 TABLET ORAL DAILY
Qty: 90 TABLET | Refills: 1 | Status: SHIPPED | OUTPATIENT
Start: 2021-01-25 | End: 2022-01-04

## 2021-01-25 RX ORDER — FLUOXETINE 10 MG/1
10 CAPSULE ORAL DAILY
Qty: 90 CAPSULE | Refills: 1 | Status: SHIPPED | OUTPATIENT
Start: 2021-01-25 | End: 2021-07-06

## 2021-01-25 RX ORDER — LISINOPRIL 20 MG/1
20 TABLET ORAL DAILY
Qty: 90 TABLET | Refills: 1 | Status: SHIPPED | OUTPATIENT
Start: 2021-01-25 | End: 2021-08-03

## 2021-01-25 RX ORDER — ATORVASTATIN CALCIUM 10 MG/1
10 TABLET, FILM COATED ORAL DAILY
Qty: 90 TABLET | Refills: 1 | Status: SHIPPED | OUTPATIENT
Start: 2021-01-25 | End: 2021-10-01

## 2021-01-25 ASSESSMENT — ANXIETY QUESTIONNAIRES
GAD7 TOTAL SCORE: 14
2. NOT BEING ABLE TO STOP OR CONTROL WORRYING: MORE THAN HALF THE DAYS
7. FEELING AFRAID AS IF SOMETHING AWFUL MIGHT HAPPEN: SEVERAL DAYS
3. WORRYING TOO MUCH ABOUT DIFFERENT THINGS: MORE THAN HALF THE DAYS
6. BECOMING EASILY ANNOYED OR IRRITABLE: SEVERAL DAYS
5. BEING SO RESTLESS THAT IT IS HARD TO SIT STILL: NEARLY EVERY DAY
IF YOU CHECKED OFF ANY PROBLEMS ON THIS QUESTIONNAIRE, HOW DIFFICULT HAVE THESE PROBLEMS MADE IT FOR YOU TO DO YOUR WORK, TAKE CARE OF THINGS AT HOME, OR GET ALONG WITH OTHER PEOPLE: SOMEWHAT DIFFICULT
1. FEELING NERVOUS, ANXIOUS, OR ON EDGE: NEARLY EVERY DAY

## 2021-01-25 ASSESSMENT — PATIENT HEALTH QUESTIONNAIRE - PHQ9
5. POOR APPETITE OR OVEREATING: MORE THAN HALF THE DAYS
SUM OF ALL RESPONSES TO PHQ QUESTIONS 1-9: 9

## 2021-01-25 ASSESSMENT — MIFFLIN-ST. JEOR: SCORE: 1677.62

## 2021-01-25 NOTE — PATIENT INSTRUCTIONS
"  Patient Education   Breathing Tips to Help You Relax  [facility]  How can breathing help me relax?  When we are have strong emotions, our bodies can tense up, and our breathing can change to short, quick breaths. Sometimes, we hold our breath without even thinking about it. Taking slow, deep breaths can help us calm down and feel more balanced.   How do I do it?  Start by tensing different muscle groups as you breathe in through your nose. Then, gradually relax them as you breathe out (exhale) For example:   1. Legs and feet: Point or flex your toes. Breathe in as you tighten the muscles in your legs. Relax these muscles as you breathe out slowly.  2. Arms: Breathe in as you tighten your arm muscles. Pretend you're squeezing evi with your hands. Relax your muscles as you breathe out slowly.  3. Face: Breathe in as you tighten the muscles of your face. Relax your muscles as you breathe out slowly.  4. Breathe out through pursed lips, like you are blowing out a candle. Notice how the air feels on the tip of your nose, and then expands your belly and ribs. Try sensing your shoulders move up--like you're filling a cup. Focus on allowing the breath to flow, instead of forcing it.  Tips to become the most relaxed:    Imagine the breath first emptying from your belly, then the lung and rib area, and then the upper chest. It helps to count and draw the number out through the entire exhale (for example, \"onnnnnnnnneeeeeee\").    Try to let all the air out when you breathe out. Your out breath (exhale) should be about twice as long as your in breath (inhale).    Close your eyes, or try watching your belly rise and fall. You can also focus on something in the room or a word picture from your imagination.    You can breathe loudly (like you're trying to fog up a mirror) or silently.    Notice how your breath feels as you inhale and exhale through your nose, mouth or both.    It may help to place a hand " over your belly, upper chest area or any part of your body that is tense.    If your mind wanders, bring it back to what you are focusing on. Be kind to yourself--this breathing technique takes practice.    Set a time each day to practice. Work up to longer periods of time, if you can. Even brief periods of regular practice can improve mood and brain function.    For informational purposes only. Not to replace the advice of your health care provider.   Copyright   2013 [employer]". All rights reserved. [employer] 438771 - Rev 02/16.

## 2021-01-25 NOTE — PROGRESS NOTES
Assessment & Plan   Situational anxiety  Worsening symptoms.  And discussed therapy versus medications and he like to initiate meds:  - FLUoxetine (PROZAC) 10 MG capsule  Dispense: 90 capsule; Refill: 1    Hypertension goal BP (blood pressure) < 130/80  Controlled - continue medication.  - lisinopril (ZESTRIL) 20 MG tablet  Dispense: 90 tablet; Refill: 1  - furosemide (LASIX) 20 MG tablet  Dispense: 90 tablet; Refill: 1    Hyperlipidemia LDL goal <130  Controlled - continue medication.  - atorvastatin (LIPITOR) 10 MG tablet  Dispense: 90 tablet; Refill: 1    Palpitations  Off-and-on symptoms when he feels anxious.  No significant chest pains, lightheadedness or syncope.      Return in about 3 months (around 4/25/2021) for wellness exam with fasting labs, in person, with Dr Chance Blair.      Chance Blair MD      [address] [address]  [email or web address]   Office: [phone]       Meghna Graham is a 60 year old who presents to clinic today for the following health issues     HPI     Anxiety  Onset/Duration: for the last 2-3 months   Description: anxiety  Depression (if yes, do PHQ-9): YES  Anxiety (if yes, do GEORGINA-7): YES  Accompanying Signs & Symptoms:  Still participating in activities that you used to enjoy: YES  Fatigue: YES  Irritability: no  Difficulty concentrating: YES  Changes in appetite: no  Problems with sleep: no  Heart racing/beating fast: YES - skips at times.  Abnormally elevated, expansive, or irritable mood: YES  Persistently increased activity or energy: no  Thoughts of hurting yourself or others: no  History:  Recent stress or major life event: covid- work  Prior depression or anxiety: None  Family history of depression or anxiety:   Alcohol/drug use: no  Difficulty sleeping: wakes up middle of night can not shut mind off  Precipitating or alleviating factors: None  Therapies tried and outcome: none  PHQ 1/25/2021   PHQ-9 Total Score 9  "  Q9: Thoughts of better off dead/self-harm past 2 weeks Not at all     GEORGINA-7 SCORE 1/25/2021   Total Score 14       ADAM history - sleeps in recliner - declined trying a CPAP    Review of Systems   Constitutional, HEENT, cardiovascular, pulmonary, GI, , musculoskeletal, neuro, skin, endocrine and psych systems are negative, except as otherwise noted.      Objective    /80   Pulse 67   Temp 95.8  F (35.4  C) (Tympanic)   Ht 1.727 m (5' 8\")   Wt 89.8 kg (198 lb)   SpO2 97%   BMI 30.11 kg/m    Body mass index is 30.11 kg/m .  Physical Exam   GENERAL: healthy, alert and no distress  NECK: no adenopathy, no asymmetry, masses, or scars and thyroid normal to palpation  RESP: lungs clear to auscultation - no rales, rhonchi or wheezes  CV: regular rate and rhythm, normal S1 S2, no S3 or S4, no murmur, click or rub, no peripheral edema and peripheral pulses strong  ABDOMEN: soft, nontender, no hepatosplenomegaly, no masses and bowel sounds normal  MS: no gross musculoskeletal defects noted, no edema  NEURO: Normal strength and tone, mentation intact and speech normal  PSYCH: mentation appears normal, affect normal/bright  LYMPH: no cervical, supraclavicular, axillary, or inguinal adenopathy          "

## 2021-01-26 ASSESSMENT — ANXIETY QUESTIONNAIRES: GAD7 TOTAL SCORE: 14

## 2021-03-01 ENCOUNTER — ANCILLARY PROCEDURE (OUTPATIENT)
Dept: GENERAL RADIOLOGY | Facility: CLINIC | Age: 61
End: 2021-03-01
Attending: NURSE PRACTITIONER
Payer: COMMERCIAL

## 2021-03-01 ENCOUNTER — OFFICE VISIT (OUTPATIENT)
Dept: FAMILY MEDICINE | Facility: CLINIC | Age: 61
End: 2021-03-01
Payer: COMMERCIAL

## 2021-03-01 VITALS
OXYGEN SATURATION: 99 % | WEIGHT: 198 LBS | SYSTOLIC BLOOD PRESSURE: 138 MMHG | HEIGHT: 68 IN | TEMPERATURE: 97.3 F | DIASTOLIC BLOOD PRESSURE: 80 MMHG | BODY MASS INDEX: 30.01 KG/M2 | HEART RATE: 68 BPM

## 2021-03-01 DIAGNOSIS — T18.9XXA SWALLOWED CHICKEN BONE, INITIAL ENCOUNTER: ICD-10-CM
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
## 2021-03-01 DIAGNOSIS — T18.9XXA SWALLOWED CHICKEN BONE, INITIAL ENCOUNTER: Primary | ICD-10-CM
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
## 2021-03-01 PROCEDURE — 99213 OFFICE O/P EST LOW 20 MIN: CPT | Performed by: NURSE PRACTITIONER

## 2021-03-01 PROCEDURE — 74019 RADEX ABDOMEN 2 VIEWS: CPT | Mod: FY | Performed by: RADIOLOGY

## 2021-03-01 ASSESSMENT — MIFFLIN-ST. JEOR: SCORE: 1677.62

## 2021-03-01 NOTE — PROGRESS NOTES
"    Assessment & Plan     Swallowed chicken bone, initial encounter  Reassuring examination.  Reassurance provided.  Does not wish to seek higher level of care GI referral for consultation.    X-ray appears unremarkable awaiting official radiology read.  At home care described red flag symptoms discussed.  Red flag symptoms discussed and if these occur present to the emergency room or call 911.  Estevan verbalizes understanding of plan of care and is in agreement.   - XR Abdomen 2 Views        Return if symptoms worsen or fail to improve, for Recheck.    Laila Berry, [facility]    Meghna Graham is a 60 year old who presents for the following health issues   HPI       Concern - Feels he swallowed a chicken bone last night   Onset: 02/28/2021  Description: mild  Intensity: 0/10  Progression of Symptoms:  improving  Accompanying Signs & Symptoms: no pain  Previous history of similar problem: no  Precipitating factors:        Worsened by: none  Alleviating factors:        Improved by: none  Therapies tried and outcome: Milk Bread    Just wants to ensure everything is okay.    Denies nausea or vomiting.  Denies abdominal pain any difficulty swallowing or breathing.  Denies any blood in stool.    Review of Systems   Constitutional, HEENT, cardiovascular, pulmonary, GI, , musculoskeletal, neuro, skin, endocrine and psych systems are negative, except as otherwise noted in the HPI.      Objective    /80   Pulse 68   Temp 97.3  F (36.3  C) (Tympanic)   Ht 1.727 m (5' 8\")   Wt 89.8 kg (198 lb)   SpO2 99%   BMI 30.11 kg/m    Body mass index is 30.11 kg/m .  Physical Exam   GENERAL: healthy, alert and no distress  RESP: lungs clear to auscultation - no rales, rhonchi or wheezes  CV: regular rate and rhythm, normal S1 S2, no S3 or S4, no murmur, click or rub, no peripheral edema and peripheral pulses strong  ABDOMEN: soft, nontender, no hepatosplenomegaly, no masses and bowel " sounds normal  PSYCH: mentation appears normal, affect normal/bright    Results for orders placed or performed in visit on 03/01/21   XR Abdomen 2 Views     Status: None    Narrative    XR ABDOMEN 2 VW   3/1/2021 11:56 AM     HISTORY: Swallowed chicken bone, initial encounter    COMPARISON: None.      Impression    IMPRESSION: No free air. Nonobstructive bowel gas pattern. Moderate  amount of formed stool in the colon. Pelvic phleboliths. Mild  degenerative changes in the spine.     PATRICK TUCKER MD

## 2021-03-01 NOTE — PATIENT INSTRUCTIONS
Patient Education     Swallowed Foreign Body (Adult)  Indigestible objects (foreign bodies) are sometimes swallowed by adults. Whether or not the object moves all the way through the digestive tract depends on many factors. This includes the size and shape of the object, whether the object is sharp and pointy, and what the object is made of.   Based on your evaluation, no treatment is needed at this time. The swallowed object is expected to move through your digestive tract and pass out of the body in the stool with no problems. This may take about 24 to 48 hours, but could take longer depending on your bowel habits. If imaging tests were done, you will be told when the results are ready and if they affect your treatment.   Home care    Follow any instructions from your provider about eating and drinking. In some cases, you may be told to only eat soft foods and drink liquids for the first 24 to 48 hours.    You will need to check your stool each time you have a bowel movement. This is so you can confirm that the object has passed, and look for signs of bleeding. If the object does not pass, it may mean that the object is stuck somewhere along the digestive tract. In such cases, the object may need to be removed with a procedure.    Follow-up care  Follow up with your healthcare provider as advised. You will be told if further treatment is needed. In certain cases, you may need to return to have imaging tests done. This especially true if the object you swallowed is visible on X-ray and doesn't seem to be passing. Call your healthcare provider if you have any questions or concerns.   When to seek medical advice  Call your healthcare provider right away if any of these occur:    Belly pain, cramps, or swelling    Shortness of breath or coughing that won t stop    Trouble swallowing or pain with swallowing    Vomiting that won t stop    Blood in the stool (black color, like tar)    Fever of 100.4 F (38 C) or higher,  or as directed by your provider    Inability to pass stool  Call 911  Call 911 if any of these occur:     Trouble breathing, wheezing    Trouble speaking    Unusually fast heart rate    New or worsening chest pain    Vomiting blood (red or black) or bleeding from your rectum (red)    Swelling of the neck    Weakness, dizziness, or faintness  Shirlene last reviewed this educational content on 12/1/2019 2000-2020 [employer]. [address] [place] [address]. All rights reserved. This information is not intended as a substitute for professional medical care. Always follow your healthcare professional's instructions.

## 2021-03-02 NOTE — RESULT ENCOUNTER NOTE
Note to Staff: please call the patient to explain results.      Please explain abdomen xray is read by radiology also as normal. Follow up if needed.       Laila Berry, LISAP-BC

## 2021-03-24 ENCOUNTER — TRANSFERRED RECORDS (OUTPATIENT)
Dept: HEALTH INFORMATION MANAGEMENT | Facility: CLINIC | Age: 61
End: 2021-03-24

## 2021-03-30 ENCOUNTER — TRANSFERRED RECORDS (OUTPATIENT)
Dept: HEALTH INFORMATION MANAGEMENT | Facility: CLINIC | Age: 61
End: 2021-03-30

## 2021-04-15 ENCOUNTER — OFFICE VISIT (OUTPATIENT)
Dept: FAMILY MEDICINE | Facility: CLINIC | Age: 61
End: 2021-04-15
Payer: COMMERCIAL

## 2021-04-15 VITALS
HEIGHT: 68 IN | TEMPERATURE: 97.4 F | DIASTOLIC BLOOD PRESSURE: 82 MMHG | BODY MASS INDEX: 31.07 KG/M2 | RESPIRATION RATE: 20 BRPM | WEIGHT: 205 LBS | HEART RATE: 68 BPM | SYSTOLIC BLOOD PRESSURE: 146 MMHG | OXYGEN SATURATION: 98 %

## 2021-04-15 DIAGNOSIS — Z01.818 PREOPERATIVE EXAMINATION: Primary | ICD-10-CM

## 2021-04-15 DIAGNOSIS — S46.011S SUPRASPINATUS TENDON RUPTURE, RIGHT, SEQUELA: ICD-10-CM

## 2021-04-15 DIAGNOSIS — M25.511 RIGHT SHOULDER PAIN, UNSPECIFIED CHRONICITY: ICD-10-CM

## 2021-04-15 DIAGNOSIS — Z12.11 SCREEN FOR COLON CANCER: ICD-10-CM

## 2021-04-15 DIAGNOSIS — Z12.5 SCREENING FOR MALIGNANT NEOPLASM OF PROSTATE: ICD-10-CM

## 2021-04-15 DIAGNOSIS — E21.3 HYPERPARATHYROIDISM (H): ICD-10-CM

## 2021-04-15 DIAGNOSIS — I10 HYPERTENSION GOAL BP (BLOOD PRESSURE) < 130/80: ICD-10-CM

## 2021-04-15 DIAGNOSIS — E83.52 SERUM CALCIUM ELEVATED: ICD-10-CM

## 2021-04-15 PROCEDURE — 80048 BASIC METABOLIC PNL TOTAL CA: CPT | Performed by: FAMILY MEDICINE

## 2021-04-15 PROCEDURE — 99214 OFFICE O/P EST MOD 30 MIN: CPT | Performed by: FAMILY MEDICINE

## 2021-04-15 PROCEDURE — 36415 COLL VENOUS BLD VENIPUNCTURE: CPT | Performed by: FAMILY MEDICINE

## 2021-04-15 PROCEDURE — G0103 PSA SCREENING: HCPCS | Performed by: FAMILY MEDICINE

## 2021-04-15 PROCEDURE — 82043 UR ALBUMIN QUANTITATIVE: CPT | Performed by: FAMILY MEDICINE

## 2021-04-15 ASSESSMENT — MIFFLIN-ST. JEOR: SCORE: 1709.37

## 2021-04-15 NOTE — PROGRESS NOTES
[address] [facility] [address]  Phone: [phone]  Primary Provider: Estevan Small  Pre-op Performing Provider: ESTEVAN SMALL      PREOPERATIVE EVALUATION:  Today's date: 4/15/2021    Estevan Aaron is a 60 year old male who presents for a preoperative evaluation.    Surgical Information:  Surgery/Procedure: right shoulder tendon repair  Surgery Location: [facility]  Surgeon: Dr. Willoughby  Surgery Date: 4-  Time of Surgery: AM  Where patient plans to recover: At home with family  Fax number for surgical facility: [phone]    Type of Anesthesia Anticipated: General    1. No - Have you ever had a heart attack or stroke?  2. No - Have you ever had surgery on your heart or blood vessels, such as a stent, coronary (heart) bypass, or surgery on an artery in the head, neck, heart, or legs?  3. No - Do you have chest pain when you are physically active?  4. No - Do you have a history of heart failure?  5. No - Do you currently have a cold, bronchitis, or symptoms of other respiratory (head and chest) infections?  6. No - Do you have a cough, shortness of breath, or wheezing?  7. No - Do you or anyone in your family have a history of blood clots?  8. No - Do you or anyone in your family have a serious bleeding problem, such as long-lasting bleeding after surgeries or cuts?  9. No - Have you ever had anemia or been told to take iron pills?  10. No - Have you had any abnormal blood loss such as black, tarry or bloody stools, or abnormal vaginal bleeding?  11. No - Have you ever had a blood transfusion?  12. Yes - Are you willing to have a blood transfusion if it is medically needed before, during, or after your surgery?  13. No - Have you or anyone in your family ever had problems with anesthesia (sedation for surgery)?  14. Yes - Do you have sleep apnea, excessive snoring, or daytime drowsiness? Snores and sleep in a recliner, no  witnessed apnea Do you have a CPAP machine? No - never tested.  15. No - Do you have any artifical heart valves or other implanted medical devices, such as a pacemaker, defibrillator, or continuous glucose monitor?  16. No - Do you have any artifical joints?  17. No - Are you allergic to latex?    Assessment & Plan     The proposed surgical procedure is considered INTERMEDIATE risk.    Preoperative examination    Right shoulder pain, unspecified chronicity  Supraspinatus tendon rupture, right, sequela      Screen for colon cancer  - GASTROENTEROLOGY ADULT REF PROCEDURE ONLY    Medication Instructions:  Patient is to take all scheduled medications on the day of surgery EXCEPT for modifications listed below:   - ACE/ARB: May be continued on the day of surgery.   Hold Hydrochlorothiazide the day of surgery    RECOMMENDATION:  APPROVAL GIVEN to proceed with proposed procedure, without further diagnostic evaluation.      Chance Blair MD     [address] [address]  Office: [phone]  [email or web address]       Subjective     HPI related to upcoming procedure: Right shoulder pain    Health Care Directive:  Patient does not have a Health Care Directive or Living Will: Discussed advance care planning with patient; information given to patient to review.    Preoperative Review of :   reviewed - controlled substances prescribed by other outside provider(s).    Review of Systems  CONSTITUTIONAL: NEGATIVE for fever, chills, change in weight  ENT/MOUTH: NEGATIVE for ear, mouth and throat problems  RESP: NEGATIVE for significant cough or SOB  CV: NEGATIVE for chest pain, palpitations or peripheral edema    Patient Active Problem List    Diagnosis Date Noted     Hyperparathyroidism (H) 11/03/2020     Priority: High     Hypertension goal BP (blood pressure) < 130/80 10/20/2016     Priority: High     Thalassemia, unspecified type 11/03/2020      "Priority: Medium     Serum calcium elevated 01/10/2018     Priority: Medium     Hyperlipidemia LDL goal <130 01/31/2012     Priority: Medium     The 10-year ASCVD risk score (Jonasjohanne MCKEON Jr, et al., 2013) is: 28.2%    Values used to calculate the score:      Age: 57 years      Sex: Male      Is Non- : No      Diabetic: No      Tobacco smoker: Yes      Systolic Blood Pressure: 136 mmHg      Is BP treated: Yes      HDL Cholesterol: 33 mg/dL      Total Cholesterol: 273 mg/dL          Past Medical History:   Diagnosis Date     Kidney stone      Serum calcium elevated      Tobacco use disorder     tobacco quit line referral done 4/19/06     Tobacco use disorder     tobacco quit line referral done 4/19/06     Past Surgical History:   Procedure Laterality Date     NO HISTORY OF SURGERY       Current Outpatient Medications   Medication Sig Dispense Refill     atorvastatin (LIPITOR) 10 MG tablet Take 1 tablet (10 mg) by mouth daily 90 tablet 1     FLUoxetine (PROZAC) 10 MG capsule Take 1 capsule (10 mg) by mouth daily 90 capsule 1     furosemide (LASIX) 20 MG tablet Take 1 tablet (20 mg) by mouth daily 90 tablet 1     lisinopril (ZESTRIL) 20 MG tablet Take 1 tablet (20 mg) by mouth daily 90 tablet 1       No Known Allergies     Social History     Tobacco Use     Smoking status: Former Smoker     Packs/day: 0.50     Types: Cigarettes     Smokeless tobacco: Never Used   Substance Use Topics     Alcohol use: Yes     Alcohol/week: 0.0 standard drinks       History   Drug Use No     Comment: no herbal meds either          Objective     BP (!) 146/82   Pulse 68   Temp 97.4  F (36.3  C) (Tympanic)   Resp 20   Ht 1.727 m (5' 8\")   Wt 93 kg (205 lb)   SpO2 98%   BMI 31.17 kg/m      Physical Exam    GENERAL APPEARANCE: healthy, alert and no distress     EYES: EOMI,  PERRL     HENT: ear canals and TM's normal and nose and mouth without ulcers or lesions     NECK: no adenopathy, no asymmetry, masses, or scars " and thyroid normal to palpation     RESP: lungs clear to auscultation - no rales, rhonchi or wheezes     CV: regular rates and rhythm, normal S1 S2, no S3 or S4 and no murmur, click or rub     ABDOMEN:  soft, nontender, no HSM or masses and bowel sounds normal     MS: extremities normal- no gross deformities noted, no evidence of inflammation in joints, FROM in all extremities.     SKIN: no suspicious lesions or rashes     NEURO: Normal strength and tone, sensory exam grossly normal, mentation intact and speech normal     PSYCH: mentation appears normal. and affect normal/bright     LYMPHATICS: No cervical adenopathy    Recent Labs   Lab Test 11/03/20  0935   HGB 16.1         POTASSIUM 4.5   CR 0.94        Diagnostics:  Labs pending at this time.  Results will be reviewed when available.   No EKG required, no history of coronary heart disease, significant arrhythmia, peripheral arterial disease or other structural heart disease.    Revised Cardiac Risk Index (RCRI):  The patient has the following serious cardiovascular risks for perioperative complications:   - No serious cardiac risks = 0 points     RCRI Interpretation: 0 points: Class I (very low risk - 0.4% complication rate)         Signed Electronically by: Estevan Blair MD  Copy of this evaluation report is provided to requesting physician.

## 2021-04-16 LAB
ANION GAP SERPL CALCULATED.3IONS-SCNC: 3 MMOL/L (ref 3–14)
BUN SERPL-MCNC: 17 MG/DL (ref 7–30)
CALCIUM SERPL-MCNC: 11.1 MG/DL (ref 8.5–10.1)
CHLORIDE SERPL-SCNC: 105 MMOL/L (ref 94–109)
CO2 SERPL-SCNC: 29 MMOL/L (ref 20–32)
CREAT SERPL-MCNC: 0.9 MG/DL (ref 0.66–1.25)
CREAT UR-MCNC: 67 MG/DL
GFR SERPL CREATININE-BSD FRML MDRD: >90 ML/MIN/{1.73_M2}
GLUCOSE SERPL-MCNC: 86 MG/DL (ref 70–99)
MICROALBUMIN UR-MCNC: 8 MG/L
MICROALBUMIN/CREAT UR: 12.55 MG/G CR (ref 0–17)
POTASSIUM SERPL-SCNC: 3.8 MMOL/L (ref 3.4–5.3)
PSA SERPL-ACNC: 2.88 UG/L (ref 0–4)
SODIUM SERPL-SCNC: 137 MMOL/L (ref 133–144)

## 2021-04-20 NOTE — RESULT ENCOUNTER NOTE
Please call to inform them of endocrinology referral recommendation due to his hyperparathyroidism.    -PSA (prostate specific antigen) test is normal.  This indicates a low likelihood of prostate cancer.  ADVISE: rechecking this in 1 year.  -Kidney function (GFR) is normal.  -Sodium is normal.  -Potassium is normal.  -Calcium is still elevated and due to hyperparathyroidism. This can cause kidney stones (which you have had) and other problems. ADVISE: Meeting with an endocrinologist for further assessment and work-up as well as discussion of whether a procedure on your parathyroid gland would be indicated. Endocrinology referral -the scheduling department should be calling you to schedule this. If you do not hear from them then you can call: [facility] [phone]  -Glucose (diabetic screening test) is normal.  -Microalbumin (urine protein) test is normal.  ADVISE: rechecking this annually.     For additional lab test information, labtestsonline.org is an excellent reference.

## 2021-04-29 ENCOUNTER — NURSE TRIAGE (OUTPATIENT)
Dept: FAMILY MEDICINE | Facility: CLINIC | Age: 61
End: 2021-04-29

## 2021-04-29 DIAGNOSIS — K08.89 TOOTHACHE: Primary | ICD-10-CM

## 2021-04-29 NOTE — TELEPHONE ENCOUNTER
Patient calling back, in a lot of pain, wont sleep tonight without some medication. Please call patient once you get something approved.       Mariaelena Mann

## 2021-04-29 NOTE — TELEPHONE ENCOUNTER
"Patient calling    Answer Assessment - Initial Assessment Questions  1. LOCATION: \"Which tooth is hurting?\"  (e.g., right-side/left-side, upper/lower, front/back)      L Upper - filled molar   2. ONSET: \"When did the toothache start?\"  (e.g., hours, days)       Yesterday morning patient felt pain then it went away. Patient woke up this morning with much more pain.   3. SEVERITY: \"How bad is the toothache?\"  (Scale 1-10; mild, moderate or severe)    - MILD (1-3): doesn't interfere with chewing     - MODERATE (4-7): interferes with chewing, interferes with normal activities, awakens from sleep      - SEVERE (8-10): unable to eat, unable to do any normal activities, excruciating pain         6-7/10  4. SWELLING: \"Is there any visible swelling of your face?\"      no  5. OTHER SYMPTOMS: \"Do you have any other symptoms?\" (e.g., fever)      Nothing    Protocols used: TOOTHACHE-A-[place]    Patient stated his spouse is +COVID and patient is in quarantine until next Wednesday so cannot see a dentist at this time.     Patient requesting antibiotic and a small amount of pain medicatio (had been taking oxycodone 5mg tabs R/T recent shoulder surgery).     [facility]    Ph. [phone] (OK to leave a detailed VM)      Amalia Horne RN  [facility]  "

## 2021-04-30 RX ORDER — ACETAMINOPHEN 500 MG
500-1000 TABLET ORAL EVERY 6 HOURS PRN
COMMUNITY
Start: 2021-04-30 | End: 2022-01-18

## 2021-04-30 RX ORDER — IBUPROFEN 200 MG
600-800 TABLET ORAL EVERY 8 HOURS PRN
COMMUNITY
Start: 2021-04-30 | End: 2021-05-08

## 2021-04-30 RX ORDER — OXYCODONE HYDROCHLORIDE 5 MG/1
5 TABLET ORAL EVERY 6 HOURS PRN
Qty: 10 TABLET | Refills: 0 | Status: SHIPPED | OUTPATIENT
Start: 2021-04-30 | End: 2021-05-03

## 2021-04-30 RX ORDER — AMOXICILLIN 875 MG
875 TABLET ORAL 2 TIMES DAILY
Qty: 20 TABLET | Refills: 0 | Status: SHIPPED | OUTPATIENT
Start: 2021-04-30 | End: 2021-05-10

## 2021-04-30 NOTE — TELEPHONE ENCOUNTER
Pt called back, Patient stated an understanding and agreed with plan.  Pt had no further questions.    Kenneth MIRANDA RN   [facility]

## 2021-04-30 NOTE — TELEPHONE ENCOUNTER
Attempt # 1  Called # [phone]     Left a non detailed VM to call back at [phone] and ask for any available Triage Nurse.    Kenneth Haque RN   [facility]

## 2021-04-30 NOTE — TELEPHONE ENCOUNTER
I sent in some antibiotic as well as small quantity of pain medicine.  He also should take ibuprofen 6-800 every 6-8 hours and can add Tylenol 1000 mg every 6 hours.  Ice or heat might help to the area.  He should see the dentist as soon as possible and I will not be planning on prescribing any further pain medication.

## 2021-05-04 ENCOUNTER — TRANSFERRED RECORDS (OUTPATIENT)
Dept: HEALTH INFORMATION MANAGEMENT | Facility: CLINIC | Age: 61
End: 2021-05-04

## 2021-05-19 ENCOUNTER — TELEPHONE (OUTPATIENT)
Dept: FAMILY MEDICINE | Facility: CLINIC | Age: 61
End: 2021-05-19

## 2021-05-19 NOTE — TELEPHONE ENCOUNTER
Refill request for  aspirin EC 81 mg    Medication never ordered here, Dr. Blair pre-op. Patient had surgery on 4/22 at [facility]. He thought he was going to be on it 30 days, they gave him 60 tabs. He isn't sure if he needs to keep taking it.     Advised him to call [facility] to clarify and to reorder if needed. He saw Dr. Craig. Pharmacy deleted request.     Jewell Frazier RN  [facility]

## 2021-06-01 ENCOUNTER — TRANSFERRED RECORDS (OUTPATIENT)
Dept: HEALTH INFORMATION MANAGEMENT | Facility: CLINIC | Age: 61
End: 2021-06-01

## 2021-07-13 ENCOUNTER — TRANSFERRED RECORDS (OUTPATIENT)
Dept: HEALTH INFORMATION MANAGEMENT | Facility: CLINIC | Age: 61
End: 2021-07-13

## 2021-07-29 DIAGNOSIS — I10 HYPERTENSION GOAL BP (BLOOD PRESSURE) < 130/80: ICD-10-CM

## 2021-08-02 NOTE — TELEPHONE ENCOUNTER
Routing refill request to provider for review/approval because:  BP Readings from Last 3 Encounters:   04/15/21 (!) 146/82   03/01/21 138/80   01/25/21 138/80        Qi Aaron RN

## 2021-08-03 RX ORDER — LISINOPRIL 20 MG/1
20 TABLET ORAL DAILY
Qty: 90 TABLET | Refills: 1 | Status: SHIPPED | OUTPATIENT
Start: 2021-08-03 | End: 2022-01-04

## 2021-08-09 ENCOUNTER — NURSE TRIAGE (OUTPATIENT)
Dept: NURSING | Facility: CLINIC | Age: 61
End: 2021-08-09

## 2021-08-10 NOTE — TELEPHONE ENCOUNTER
Patient calling about right great toe that is painful. The toe has a pocket of fluid on top and the toe is swollen and achy. Patient has soaked in epsom salt and that has not helped.   Pain with walking 8-9/10 and at rest pain is mild when elevated.   Patient states that the symptoms started this morning when he woke up.   Protocol recommends see PCP within 24 hours.   Patient to call back with any worsening symptoms.   Kelsey Phillip RN   08/09/21 9:05 PM  [facility] Nurse Advisor    Reason for Disposition    [1] Redness of the skin AND [2] no fever    Additional Information    Negative: Followed a foot injury    Negative: Diabetes    Negative: Ankle pain is main symptom    Negative: Thigh or calf pain is main symptom    Negative: Entire foot is cool or blue in comparison to other foot    Negative: Purple or black skin on foot or toe    Negative: [1] Red area or streak AND [2] fever    Negative: [1] Swollen foot AND [2] fever    Negative: Patient sounds very sick or weak to the triager    Negative: [1] SEVERE pain (e.g., excruciating, unable to do any normal activities) AND [2] not improved after 2 hours of pain medicine    Negative: [1] Looks infected (spreading redness, pus) AND [2] large red area (> 2 in. or 5 cm)    Negative: Looks like a boil, infected sore, or deep ulcer    Protocols used: FOOT PAIN-A-

## 2021-08-10 NOTE — TELEPHONE ENCOUNTER
Pt calling to inquire if there is anything to prevent gout. Pt noted he had a foot pain yesterday in big toe. Pt noted he has 3 big pieces of fish recently.     Pt advised there are medications to help relieve gout and there are medications to help prevent gout if chronic problem. Writer advised diet changes can help alleviate/prevent gout for future. Pt advised can use OTC NSAID's for pain. Pt advised to be seen if persistent. Patient stated an understanding and agreed with plan.    Kenneth MIRANDA RN   [facility] Triage

## 2021-09-22 DIAGNOSIS — E78.5 HYPERLIPIDEMIA LDL GOAL <130: ICD-10-CM

## 2021-09-22 DIAGNOSIS — F41.8 SITUATIONAL ANXIETY: ICD-10-CM

## 2021-09-23 RX ORDER — FLUOXETINE 10 MG/1
CAPSULE ORAL
Qty: 90 CAPSULE | Refills: 0 | Status: SHIPPED | OUTPATIENT
Start: 2021-09-23 | End: 2021-12-23

## 2021-09-23 NOTE — TELEPHONE ENCOUNTER
Pt is in need of labs , then with office or virtual visit prior to refills or lizzy refill.  Last labs 3/14/19    No future appointment scheduled    Routing to [facility] to help schedule Pt.    Vika Gomes RN

## 2021-09-30 NOTE — TELEPHONE ENCOUNTER
Lab appointment scheduled for 10/1/21. Needs lab orders placed. Patient did not want to schedule at this time the follow up appointment, will call when to schedule that when his schedule opens up.     Bill Odell

## 2021-10-01 ENCOUNTER — DOCUMENTATION ONLY (OUTPATIENT)
Dept: LAB | Facility: CLINIC | Age: 61
End: 2021-10-01

## 2021-10-01 ENCOUNTER — LAB (OUTPATIENT)
Dept: LAB | Facility: CLINIC | Age: 61
End: 2021-10-01
Payer: COMMERCIAL

## 2021-10-01 DIAGNOSIS — M79.676 PAIN OF TOE, UNSPECIFIED LATERALITY: ICD-10-CM

## 2021-10-01 DIAGNOSIS — Z13.220 SCREENING FOR HYPERLIPIDEMIA: ICD-10-CM

## 2021-10-01 DIAGNOSIS — M79.676 PAIN OF TOE, UNSPECIFIED LATERALITY: Primary | ICD-10-CM

## 2021-10-01 DIAGNOSIS — Z13.220 SCREENING FOR HYPERLIPIDEMIA: Primary | ICD-10-CM

## 2021-10-01 LAB
ERYTHROCYTE [DISTWIDTH] IN BLOOD BY AUTOMATED COUNT: 18.6 % (ref 10–15)
HCT VFR BLD AUTO: 47.5 % (ref 40–53)
HGB BLD-MCNC: 15.5 G/DL (ref 13.3–17.7)
MCH RBC QN AUTO: 20.1 PG (ref 26.5–33)
MCHC RBC AUTO-ENTMCNC: 32.6 G/DL (ref 31.5–36.5)
MCV RBC AUTO: 62 FL (ref 78–100)
PLATELET # BLD AUTO: 241 10E3/UL (ref 150–450)
RBC # BLD AUTO: 7.7 10E6/UL (ref 4.4–5.9)
WBC # BLD AUTO: 8.1 10E3/UL (ref 4–11)

## 2021-10-01 PROCEDURE — 85027 COMPLETE CBC AUTOMATED: CPT

## 2021-10-01 PROCEDURE — 84550 ASSAY OF BLOOD/URIC ACID: CPT

## 2021-10-01 PROCEDURE — 80061 LIPID PANEL: CPT

## 2021-10-01 PROCEDURE — 36415 COLL VENOUS BLD VENIPUNCTURE: CPT

## 2021-10-01 RX ORDER — ATORVASTATIN CALCIUM 10 MG/1
10 TABLET, FILM COATED ORAL DAILY
Qty: 90 TABLET | Refills: 1 | Status: SHIPPED | OUTPATIENT
Start: 2021-10-01 | End: 2021-10-06

## 2021-10-01 NOTE — LETTER
[facility]  [address] [place] [address]  [phone]                    October 6, 2021    Estevan PRABHAKAR Page  [address] [place] [address]      Dear Estevan,    Here is a summary of your recent test results:    Normal red blood cell (hgb) levels, normal white blood cell count and normal platelet levels.   -Cholesterol levels are above your goal levels (LDL<70).  ADVISE: increasing the dose of your medication to a atorvastatin 20 mg daily (you can take 2 of the 10 mg tablets you have and I have sent in a new prescription to the pharmacy when you run out just let them know you like it filled for 20 mg tablets), a regular exercise program with at least 150 minutes of aerobic exercise per week, and eating a low saturated fat/low carbohydrate diet.  Also, you should recheck this fasting cholesterol panel in 12 months.   Your test results are enclosed.               Thank you very much for trusting [facility].     Healthy regards,            Chance Blair M.D.        Results for orders placed or performed in visit on 10/01/21   Lipid panel reflex to direct LDL Fasting     Status: Abnormal   Result Value Ref Range    Cholesterol 202 (H) <200 mg/dL    Triglycerides 298 (H) <150 mg/dL    Direct Measure HDL 28 (L) >=40 mg/dL    LDL Cholesterol Calculated 114 (H) <=100 mg/dL    Non HDL Cholesterol 174 (H) <130 mg/dL    Patient Fasting > 8hrs? No     Narrative    Cholesterol  Desirable:  <200 mg/dL    Triglycerides  Normal:  Less than 150 mg/dL  Borderline High:  150-199 mg/dL  High:  200-499 mg/dL  Very High:  Greater than or equal to 500 mg/dL    Direct Measure HDL  Female:  Greater than or equal to 50 mg/dL   Male:  Greater than or equal to 40 mg/dL    LDL Cholesterol  Desirable:  <100mg/dL  Above Desirable:  100-129 mg/dL   Borderline High:  130-159 mg/dL   High:  160-189 mg/dL   Very High:  >= 190 mg/dL    Non HDL Cholesterol  Desirable:  130  mg/dL  Above Desirable:  130-159 mg/dL  Borderline High:  160-189 mg/dL  High:  190-219 mg/dL  Very High:  Greater than or equal to 220 mg/dL   CBC with platelets     Status: Abnormal   Result Value Ref Range    WBC Count 8.1 4.0 - 11.0 10e3/uL    RBC Count 7.70 (H) 4.40 - 5.90 10e6/uL    Hemoglobin 15.5 13.3 - 17.7 g/dL    Hematocrit 47.5 40.0 - 53.0 %    MCV 62 (L) 78 - 100 fL    MCH 20.1 (L) 26.5 - 33.0 pg    MCHC 32.6 31.5 - 36.5 g/dL    RDW 18.6 (H) 10.0 - 15.0 %    Platelet Count 241 150 - 450 10e3/uL   Uric acid     Status: Abnormal   Result Value Ref Range    Uric Acid 7.7 (H) 3.5 - 7.2 mg/dL

## 2021-10-02 LAB
CHOLEST SERPL-MCNC: 202 MG/DL
FASTING STATUS PATIENT QL REPORTED: NO
HDLC SERPL-MCNC: 28 MG/DL
LDLC SERPL CALC-MCNC: 114 MG/DL
NONHDLC SERPL-MCNC: 174 MG/DL
TRIGL SERPL-MCNC: 298 MG/DL
URATE SERPL-MCNC: 7.7 MG/DL (ref 3.5–7.2)

## 2021-10-06 DIAGNOSIS — E78.5 HYPERLIPIDEMIA LDL GOAL <130: ICD-10-CM

## 2021-10-06 RX ORDER — ATORVASTATIN CALCIUM 20 MG/1
20 TABLET, FILM COATED ORAL DAILY
Qty: 90 TABLET | Refills: 1 | Status: SHIPPED | OUTPATIENT
Start: 2021-10-06 | End: 2022-01-18

## 2021-10-06 NOTE — RESULT ENCOUNTER NOTE
Note to Staff: please send a result letter    -Normal red blood cell (hgb) levels, normal white blood cell count and normal platelet levels.  -Cholesterol levels are above your goal levels (LDL<70).  ADVISE: increasing the dose of your medication to a atorvastatin 20 mg daily (you can take 2 of the 10 mg tablets you have and I have sent in a new prescription to the pharmacy when you run out just let them know you like it filled for 20 mg tablets), a regular exercise program with at least 150 minutes of aerobic exercise per week, and eating a low saturated fat/low carbohydrate diet.  Also, you should recheck this fasting cholesterol panel in 12 months.    -Uric acid is elevated and puts her at risk for a possible gout related joint pain episode.    For additional lab test information, labtestsonline.org is an excellent reference.

## 2021-11-02 ENCOUNTER — TRANSFERRED RECORDS (OUTPATIENT)
Dept: HEALTH INFORMATION MANAGEMENT | Facility: CLINIC | Age: 61
End: 2021-11-02
Payer: COMMERCIAL

## 2021-12-29 ENCOUNTER — NURSE TRIAGE (OUTPATIENT)
Dept: FAMILY MEDICINE | Facility: CLINIC | Age: 61
End: 2021-12-29
Payer: COMMERCIAL

## 2021-12-29 DIAGNOSIS — R22.0 FACIAL SWELLING: Primary | ICD-10-CM

## 2021-12-29 NOTE — TELEPHONE ENCOUNTER
"Please review and advise.Routed to Dr. Blair    Called patient to follow up on phone call concerning tooth pain.      Patient stated pain started last night, left upper side, patient couldn't tell me exactly what tooth and has not looked at the gum area to be able to answer if there is a white pocket or visable swelling, patient does not think the tooth is draining. Patient is not sure if there is a crown on that tooth.   Pain is moderate mostly chewing or touching the area.   no fevers, no chills     Able to work declined to come in the clinic today or tomorrow due to work. Did encouraged an e visit (gave information on how to do that) or urgent care today (gave location information) patient declined    Encouraged for him to call his dentist today( time was 430pm) and make an appointment as soon as possible, stated will when he can gets around to it with his work schedule.    Patient stated \"it should not be a big deal to just write an antibiotic prescription I have been coming to the clinic for years\" \"I don't think I should have to be seen for a toothache.\"    Declined need for pain medication, instructed to take tylenol OTC as directed, and  use ice 20 minutes on and one hour off  to reduce pain and swelling.     Reason for Disposition    Face is swollen     \"I think my cheek on the left side is a little swollen\"    Additional Information    Negative: Toothache followed tooth injury    Negative: Fever    Negative: SEVERE toothache pain     \"moderate\"    Negative: Pale cold skin and very weak (can't stand)    Negative: Similar pain previously and it was from 'heart attack'    Negative: Similar pain previously and it was from 'angina' and not relieved by nitroglycerin    Negative: Sounds like a life-threatening emergency to the triager    Negative: Chest pain    Negative: Patient sounds very sick or weak to the triager    Protocols used: TOOTHACHE-A-[place]    Declined to answer all assessment questions- per patient " needed to get off the phone to do his job.     Celi SAUCEDO RN   [facility] Triage

## 2021-12-29 NOTE — TELEPHONE ENCOUNTER
Patient calling stating that he has a bad tooth and possibly infected and he's in a lot of pain. He's wondering if you'll be able to prescribe some type of medication for this as he has to wait to get it worked on due to possible infection. Please advise.     Bill Odell

## 2021-12-30 NOTE — TELEPHONE ENCOUNTER
Attempt # 1  Called Phone # [phone]    Left a detailed voicemail , verbal ok per patient yesterday. Instructed that his rx will be ready to  when he gets a text from the pharmacy and to make a dentist appointment as soon as possible. Stated please call if you have any questions or concerns about the tooth and or medication, or if symptoms are persisting. Left phone # [phone]. Writer stated there are triage nurses available day, night and on the weekend along with urgent care and most dentist have an emergency line.     Celi SAUCEDO RN   [facility] Triage

## 2021-12-31 NOTE — TELEPHONE ENCOUNTER
Patient calling stating that his pain has severe pain. He has a throbbing headache and he has appointment at an emergency dental for Saturday 1/1/22 morning at 9am, but he's wondering if you can prescribe anything on top of his antibiotic. Please advise.     Patient would like a call back if someone is or is not sent in.     Bill Odell

## 2022-01-03 ENCOUNTER — TELEPHONE (OUTPATIENT)
Dept: FAMILY MEDICINE | Facility: CLINIC | Age: 62
End: 2022-01-03
Payer: COMMERCIAL

## 2022-01-04 ENCOUNTER — OFFICE VISIT (OUTPATIENT)
Dept: FAMILY MEDICINE | Facility: CLINIC | Age: 62
End: 2022-01-04
Payer: COMMERCIAL

## 2022-01-04 VITALS
BODY MASS INDEX: 32.3 KG/M2 | WEIGHT: 213.1 LBS | OXYGEN SATURATION: 96 % | DIASTOLIC BLOOD PRESSURE: 80 MMHG | TEMPERATURE: 98.1 F | HEART RATE: 73 BPM | HEIGHT: 68 IN | SYSTOLIC BLOOD PRESSURE: 138 MMHG

## 2022-01-04 DIAGNOSIS — I10 HYPERTENSION GOAL BP (BLOOD PRESSURE) < 130/80: Primary | ICD-10-CM

## 2022-01-04 DIAGNOSIS — Z12.11 SCREEN FOR COLON CANCER: ICD-10-CM

## 2022-01-04 DIAGNOSIS — E21.3 HYPERPARATHYROIDISM (H): ICD-10-CM

## 2022-01-04 PROCEDURE — 99213 OFFICE O/P EST LOW 20 MIN: CPT | Performed by: FAMILY MEDICINE

## 2022-01-04 RX ORDER — FUROSEMIDE 20 MG
20 TABLET ORAL DAILY
Qty: 90 TABLET | Refills: 1 | Status: SHIPPED | OUTPATIENT
Start: 2022-01-04 | End: 2022-01-18

## 2022-01-04 RX ORDER — LISINOPRIL 40 MG/1
40 TABLET ORAL DAILY
Qty: 90 TABLET | Refills: 1 | Status: SHIPPED | OUTPATIENT
Start: 2022-01-04 | End: 2022-01-18

## 2022-01-04 ASSESSMENT — MIFFLIN-ST. JEOR: SCORE: 1733.18

## 2022-01-04 NOTE — PROGRESS NOTES
"  Assessment & Plan   Hypertension goal BP (blood pressure) < 130/80  BP elevated and will increase the dose of lisinopril to 40 mg daily.  Close follow-up of blood pressure  - furosemide (LASIX) 20 MG tablet  Dispense: 90 tablet; Refill: 1  - lisinopril (ZESTRIL) 40 MG tablet  Dispense: 90 tablet; Refill: 1    Hyperparathyroidism (H)  Known history and stable, will check labs with next preventive visit    Screen for colon cancer  Due for screening  - Adult Gastro Ref - Procedure Only      BMI:   Estimated body mass index is 32.88 kg/m  as calculated from the following:    Height as of this encounter: 1.715 m (5' 7.5\").    Weight as of this encounter: 96.7 kg (213 lb 1.6 oz).   Weight management plan: Discussed healthy diet and exercise guidelines      Return in about 2 weeks (around 1/18/2022) for wellness exam with fasting labs with Chance Blair MD.      Chance Blair MD      [address] [address]  [email or web address]   Office: [phone]       Meghna Graham is a 61 year old who presents for the following health issues     HPI       Hypertension Follow-up      Do you check your blood pressure regularly outside of the clinic? Normally no - but since the Dentist - two readings that day - 187/110 and 176/101.  Headache is better lately    Are you following a low salt diet? No    Are your blood pressures ever more than 140 on the top number (systolic) OR more   than 90 on the bottom number (diastolic), for example 140/90? Yes       Review of Systems   Constitutional, HEENT, cardiovascular, pulmonary, gi and gu systems are negative, except as otherwise noted.      Objective    /80 (BP Location: Right arm, Patient Position: Sitting, Cuff Size: Adult Large)   Pulse 73   Temp 98.1  F (36.7  C) (Tympanic)   Ht 1.715 m (5' 7.5\")   Wt 96.7 kg (213 lb 1.6 oz)   SpO2 96%   BMI 32.88 kg/m    Body mass index is 32.88 kg/m .  Physical Exam   GENERAL: healthy, " alert, well nourished, well hydrated, no distress  EYES: Eyes grossly normal to inspection, extraocular movements - intact, and PERRL  HENT: ear canals- normal; TMs- normal; Nose- normal; Mouth- no ulcers, no lesions  NECK: no tenderness, no adenopathy, no asymmetry, no masses, no stiffness; thyroid- normal to palpation  RESP: lungs clear to auscultation - no rales, no rhonchi, no wheezes  CV: regular rates and rhythm, normal S1 S2, no S3 or S4 and no murmur, no click or rub -  ABDOMEN: soft, no tenderness, no  hepatosplenomegaly, no masses, normal bowel sounds  MS: extremities- no gross deformities noted, bilateral edema  SKIN: no suspicious lesions, no rashes  PSYCH: Alert and oriented times 3; speech- coherent , normal rate and volume; able to articulate logical thoughts, able to abstract reason, no tangential thoughts, no hallucinations or delusions, affect- normal

## 2022-01-18 ENCOUNTER — OFFICE VISIT (OUTPATIENT)
Dept: FAMILY MEDICINE | Facility: CLINIC | Age: 62
End: 2022-01-18
Payer: COMMERCIAL

## 2022-01-18 VITALS
TEMPERATURE: 97.4 F | OXYGEN SATURATION: 95 % | BODY MASS INDEX: 34.07 KG/M2 | HEART RATE: 70 BPM | HEIGHT: 66 IN | DIASTOLIC BLOOD PRESSURE: 88 MMHG | WEIGHT: 212 LBS | SYSTOLIC BLOOD PRESSURE: 138 MMHG

## 2022-01-18 DIAGNOSIS — Z00.00 ROUTINE GENERAL MEDICAL EXAMINATION AT A HEALTH CARE FACILITY: Primary | ICD-10-CM

## 2022-01-18 DIAGNOSIS — F41.8 SITUATIONAL ANXIETY: ICD-10-CM

## 2022-01-18 DIAGNOSIS — E78.5 HYPERLIPIDEMIA LDL GOAL <130: ICD-10-CM

## 2022-01-18 DIAGNOSIS — Z12.11 SCREEN FOR COLON CANCER: ICD-10-CM

## 2022-01-18 DIAGNOSIS — Z12.5 SCREENING FOR MALIGNANT NEOPLASM OF PROSTATE: ICD-10-CM

## 2022-01-18 DIAGNOSIS — I10 HYPERTENSION GOAL BP (BLOOD PRESSURE) < 130/80: ICD-10-CM

## 2022-01-18 DIAGNOSIS — Z13.0 SCREENING, DEFICIENCY ANEMIA, IRON: ICD-10-CM

## 2022-01-18 LAB
ALBUMIN SERPL-MCNC: 4.1 G/DL (ref 3.4–5)
ALP SERPL-CCNC: 124 U/L (ref 40–150)
ALT SERPL W P-5'-P-CCNC: 32 U/L (ref 0–70)
ANION GAP SERPL CALCULATED.3IONS-SCNC: 7 MMOL/L (ref 3–14)
AST SERPL W P-5'-P-CCNC: 23 U/L (ref 0–45)
BILIRUB SERPL-MCNC: 0.9 MG/DL (ref 0.2–1.3)
BUN SERPL-MCNC: 16 MG/DL (ref 7–30)
CALCIUM SERPL-MCNC: 10.9 MG/DL (ref 8.5–10.1)
CHLORIDE BLD-SCNC: 108 MMOL/L (ref 94–109)
CHOLEST SERPL-MCNC: 180 MG/DL
CO2 SERPL-SCNC: 22 MMOL/L (ref 20–32)
CREAT SERPL-MCNC: 0.87 MG/DL (ref 0.66–1.25)
CREAT UR-MCNC: 50 MG/DL
ERYTHROCYTE [DISTWIDTH] IN BLOOD BY AUTOMATED COUNT: 18.3 % (ref 10–15)
FASTING STATUS PATIENT QL REPORTED: YES
GFR SERPL CREATININE-BSD FRML MDRD: >90 ML/MIN/1.73M2
GLUCOSE BLD-MCNC: 98 MG/DL (ref 70–99)
HCT VFR BLD AUTO: 49.3 % (ref 40–53)
HDLC SERPL-MCNC: 31 MG/DL
HGB BLD-MCNC: 15.8 G/DL (ref 13.3–17.7)
LDLC SERPL CALC-MCNC: 123 MG/DL
MCH RBC QN AUTO: 19.8 PG (ref 26.5–33)
MCHC RBC AUTO-ENTMCNC: 32 G/DL (ref 31.5–36.5)
MCV RBC AUTO: 62 FL (ref 78–100)
MICROALBUMIN UR-MCNC: 8 MG/L
MICROALBUMIN/CREAT UR: 16 MG/G CR (ref 0–17)
NONHDLC SERPL-MCNC: 149 MG/DL
PLATELET # BLD AUTO: 274 10E3/UL (ref 150–450)
POTASSIUM BLD-SCNC: 4.5 MMOL/L (ref 3.4–5.3)
PROT SERPL-MCNC: 7.5 G/DL (ref 6.8–8.8)
PSA SERPL-MCNC: 3 UG/L (ref 0–4)
RBC # BLD AUTO: 7.98 10E6/UL (ref 4.4–5.9)
SODIUM SERPL-SCNC: 137 MMOL/L (ref 133–144)
TRIGL SERPL-MCNC: 131 MG/DL
WBC # BLD AUTO: 7.5 10E3/UL (ref 4–11)

## 2022-01-18 PROCEDURE — 99396 PREV VISIT EST AGE 40-64: CPT | Performed by: FAMILY MEDICINE

## 2022-01-18 PROCEDURE — 36415 COLL VENOUS BLD VENIPUNCTURE: CPT | Performed by: FAMILY MEDICINE

## 2022-01-18 PROCEDURE — 80053 COMPREHEN METABOLIC PANEL: CPT | Performed by: FAMILY MEDICINE

## 2022-01-18 PROCEDURE — 85027 COMPLETE CBC AUTOMATED: CPT | Performed by: FAMILY MEDICINE

## 2022-01-18 PROCEDURE — 82043 UR ALBUMIN QUANTITATIVE: CPT | Performed by: FAMILY MEDICINE

## 2022-01-18 PROCEDURE — G0103 PSA SCREENING: HCPCS | Performed by: FAMILY MEDICINE

## 2022-01-18 PROCEDURE — 80061 LIPID PANEL: CPT | Performed by: FAMILY MEDICINE

## 2022-01-18 RX ORDER — FUROSEMIDE 20 MG
20 TABLET ORAL DAILY
Qty: 90 TABLET | Refills: 3 | Status: SHIPPED | OUTPATIENT
Start: 2022-01-18 | End: 2023-02-15

## 2022-01-18 RX ORDER — FLUOXETINE 10 MG/1
10 CAPSULE ORAL DAILY
Qty: 90 CAPSULE | Refills: 3 | Status: SHIPPED | OUTPATIENT
Start: 2022-01-18 | End: 2023-02-15

## 2022-01-18 RX ORDER — LISINOPRIL 20 MG/1
TABLET ORAL
Qty: 90 TABLET | Refills: 1 | OUTPATIENT
Start: 2022-01-18

## 2022-01-18 RX ORDER — ATORVASTATIN CALCIUM 20 MG/1
20 TABLET, FILM COATED ORAL DAILY
Qty: 90 TABLET | Refills: 3 | Status: SHIPPED | OUTPATIENT
Start: 2022-01-18 | End: 2023-02-15

## 2022-01-18 RX ORDER — LISINOPRIL 40 MG/1
40 TABLET ORAL DAILY
Qty: 90 TABLET | Refills: 3 | Status: SHIPPED | OUTPATIENT
Start: 2022-01-18 | End: 2023-02-15

## 2022-01-18 ASSESSMENT — ENCOUNTER SYMPTOMS
FREQUENCY: 0
HEMATOCHEZIA: 0
PARESTHESIAS: 0
CONSTIPATION: 0
NAUSEA: 0
SORE THROAT: 0
HEARTBURN: 0
EYE PAIN: 0
DIARRHEA: 0
HEADACHES: 0
CHILLS: 0
NERVOUS/ANXIOUS: 1
ABDOMINAL PAIN: 0
HEMATURIA: 0
PALPITATIONS: 0
ARTHRALGIAS: 1
FEVER: 0
WEAKNESS: 0
SHORTNESS OF BREATH: 0
MYALGIAS: 0
COUGH: 0
DYSURIA: 0
DIZZINESS: 0
JOINT SWELLING: 0

## 2022-01-18 ASSESSMENT — ANXIETY QUESTIONNAIRES
5. BEING SO RESTLESS THAT IT IS HARD TO SIT STILL: NOT AT ALL
GAD7 TOTAL SCORE: 1
2. NOT BEING ABLE TO STOP OR CONTROL WORRYING: NOT AT ALL
6. BECOMING EASILY ANNOYED OR IRRITABLE: NOT AT ALL
7. FEELING AFRAID AS IF SOMETHING AWFUL MIGHT HAPPEN: NOT AT ALL
3. WORRYING TOO MUCH ABOUT DIFFERENT THINGS: NOT AT ALL
1. FEELING NERVOUS, ANXIOUS, OR ON EDGE: SEVERAL DAYS
IF YOU CHECKED OFF ANY PROBLEMS ON THIS QUESTIONNAIRE, HOW DIFFICULT HAVE THESE PROBLEMS MADE IT FOR YOU TO DO YOUR WORK, TAKE CARE OF THINGS AT HOME, OR GET ALONG WITH OTHER PEOPLE: NOT DIFFICULT AT ALL

## 2022-01-18 ASSESSMENT — PATIENT HEALTH QUESTIONNAIRE - PHQ9
SUM OF ALL RESPONSES TO PHQ QUESTIONS 1-9: 2
5. POOR APPETITE OR OVEREATING: NOT AT ALL

## 2022-01-18 ASSESSMENT — MIFFLIN-ST. JEOR: SCORE: 1704.38

## 2022-01-18 NOTE — LETTER
[facility]  [address] [place] [address]  [phone]                    January 27, 2022    Estevan PRABHAKAR Page  [address] [place] [address]      Dear Estevan,    Here is a summary of your recent test results:  Normal red blood cell (hgb) levels, normal white blood cell count and normal platelet levels.   -PSA (prostate specific antigen) test is normal.  This indicates a low likelihood of prostate cancer.  ADVISE: rechecking this in 1 year.   -Cholesterol levels are above your goal levels.  ADVISE: continuing your medication, a regular exercise program with at least 150 minutes of aerobic exercise per week, and eating a low saturated fat/low carbohydrate diet.  Also, you should recheck this fasting cholesterol panel in 6 months.   -Liver and gallbladder tests (ALT,AST, Alk phos,bilirubin) are normal.   -Kidney function (GFR) is normal.   -Sodium is normal.   -Potassium is normal.   -Calcium is elevated.  ADVISE: rechecking this in 1 month.   -Glucose (diabetic screening test) is normal.   -Microalbumin (urine protein) test is normal.  ADVISE: rechecking this annually.   Your test results are enclosed.      Please contact me if you have any questions.    In addition, here is a list of due or overdue Health Maintenance reminders.    Health Maintenance Due   Topic Date Due     Pneumococcal Vaccine (1 of 4 - PCV13) Never done     Colorectal Cancer Screening  Never done     LUNG CANCER SCREENING  Never done     COVID-19 Vaccine (3 - Booster for Pfizer series) 10/27/2021       Please call us at [phone] (or use [employer]) to address the above recommendations.            Thank you very much for trusting [facility].     Healthy regards,            Chance Blair M.D.        Results for orders placed or performed in visit on 01/18/22   COMPREHENSIVE METABOLIC PANEL     Status: Abnormal   Result Value Ref Range    Sodium 137 133 - 144 mmol/L    Potassium  4.5 3.4 - 5.3 mmol/L    Chloride 108 94 - 109 mmol/L    Carbon Dioxide (CO2) 22 20 - 32 mmol/L    Anion Gap 7 3 - 14 mmol/L    Urea Nitrogen 16 7 - 30 mg/dL    Creatinine 0.87 0.66 - 1.25 mg/dL    Calcium 10.9 (H) 8.5 - 10.1 mg/dL    Glucose 98 70 - 99 mg/dL    Alkaline Phosphatase 124 40 - 150 U/L    AST 23 0 - 45 U/L    ALT 32 0 - 70 U/L    Protein Total 7.5 6.8 - 8.8 g/dL    Albumin 4.1 3.4 - 5.0 g/dL    Bilirubin Total 0.9 0.2 - 1.3 mg/dL    GFR Estimate >90 >60 mL/min/1.73m2   PSA, screen     Status: Normal   Result Value Ref Range    Prostate Specific Antigen Screen 3.00 0.00 - 4.00 ug/L   Lipid panel reflex to direct LDL Fasting     Status: Abnormal   Result Value Ref Range    Cholesterol 180 <200 mg/dL    Triglycerides 131 <150 mg/dL    Direct Measure HDL 31 (L) >=40 mg/dL    LDL Cholesterol Calculated 123 (H) <=100 mg/dL    Non HDL Cholesterol 149 (H) <130 mg/dL    Patient Fasting > 8hrs? Yes     Narrative    Cholesterol  Desirable:  <200 mg/dL    Triglycerides  Normal:  Less than 150 mg/dL  Borderline High:  150-199 mg/dL  High:  200-499 mg/dL  Very High:  Greater than or equal to 500 mg/dL    Direct Measure HDL  Female:  Greater than or equal to 50 mg/dL   Male:  Greater than or equal to 40 mg/dL    LDL Cholesterol  Desirable:  <100mg/dL  Above Desirable:  100-129 mg/dL   Borderline High:  130-159 mg/dL   High:  160-189 mg/dL   Very High:  >= 190 mg/dL    Non HDL Cholesterol  Desirable:  130 mg/dL  Above Desirable:  130-159 mg/dL  Borderline High:  160-189 mg/dL  High:  190-219 mg/dL  Very High:  Greater than or equal to 220 mg/dL   CBC with platelets     Status: Abnormal   Result Value Ref Range    WBC Count 7.5 4.0 - 11.0 10e3/uL    RBC Count 7.98 (H) 4.40 - 5.90 10e6/uL    Hemoglobin 15.8 13.3 - 17.7 g/dL    Hematocrit 49.3 40.0 - 53.0 %    MCV 62 (L) 78 - 100 fL    MCH 19.8 (L) 26.5 - 33.0 pg    MCHC 32.0 31.5 - 36.5 g/dL    RDW 18.3 (H) 10.0 - 15.0 %    Platelet Count 274 150 - 450 10e3/uL   Albumin  Random Urine Quantitative with Creat Ratio     Status: None   Result Value Ref Range    Creatinine Urine mg/dL 50 mg/dL    Albumin Urine mg/L 8 mg/L    Albumin Urine mg/g Cr 16.00 0.00 - 17.00 mg/g Cr

## 2022-01-18 NOTE — PROGRESS NOTES
SUBJECTIVE:   CC: Estevan Aaron is an 61 year old male who presents for preventative health visit.       Patient has been advised of split billing requirements and indicates understanding: Yes  Healthy Habits:     Getting at least 3 servings of Calcium per day:  Yes    Bi-annual eye exam:  Yes    Dental care twice a year:  NO    Sleep apnea or symptoms of sleep apnea:  Excessive snoring    Diet:  Low salt    Frequency of exercise:  None    Taking medications regularly:  Yes    Medication side effects:  None    PHQ-2 Total Score: 1    Additional concerns today:  No    Hyperlipidemia Follow-Up      Are you regularly taking any medication or supplement to lower your cholesterol?   Yes- atorvastatin (LIPITOR) 20 MG tablet    Are you having muscle aches or other side effects that you think could be caused by your cholesterol lowering medication?  No    Hypertension Follow-up      Do you check your blood pressure regularly outside of the clinic? No     Are you following a low salt diet? Yes    Are your blood pressures ever more than 140 on the top number (systolic) OR more   than 90 on the bottom number (diastolic), for example 140/90? ?    Anxiety Follow-Up    How are you doing with your anxiety since your last visit? Improved     Are you having other symptoms that might be associated with anxiety? No    Have you had a significant life event? No     Are you feeling depressed? Yes:  sometimes    Do you have any concerns with your use of alcohol or other drugs? No    Social History     Tobacco Use     Smoking status: Former Smoker     Packs/day: 0.75     Years: 35.00     Pack years: 26.25     Types: Cigarettes     Smokeless tobacco: Never Used   Substance Use Topics     Alcohol use: Yes     Alcohol/week: 0.0 standard drinks     Drug use: No     Comment: no herbal meds either      GEORGINA-7 SCORE 1/25/2021 1/18/2022   Total Score 14 1     PHQ 1/25/2021 1/18/2022   PHQ-9 Total Score 9 2   Q9: Thoughts of better off  dead/self-harm past 2 weeks Not at all Not at all     Last PHQ-9 1/18/2022   1.  Little interest or pleasure in doing things 0   2.  Feeling down, depressed, or hopeless 1   3.  Trouble falling or staying asleep, or sleeping too much 0   4.  Feeling tired or having little energy 1   5.  Poor appetite or overeating 0   6.  Feeling bad about yourself 0   7.  Trouble concentrating 0   8.  Moving slowly or restless 0   Q9: Thoughts of better off dead/self-harm past 2 weeks 0   PHQ-9 Total Score 2   Difficulty at work, home, or with people Somewhat difficult     GEORGINA-7  1/18/2022   1. Feeling nervous, anxious, or on edge 1   2. Not being able to stop or control worrying 0   3. Worrying too much about different things 0   4. Trouble relaxing 0   5. Being so restless that it is hard to sit still 0   6. Becoming easily annoyed or irritable 0   7. Feeling afraid, as if something awful might happen 0   GEORGINA-7 Total Score 1   If you checked any problems, how difficult have they made it for you to do your work, take care of things at home, or get along with other people? Not difficult at all     Today's PHQ-2 Score:   PHQ-2 ( 1999 Pfizer) 1/18/2022   Q1: Little interest or pleasure in doing things 0   Q2: Feeling down, depressed or hopeless 1   PHQ-2 Score 1   PHQ-2 Total Score (12-17 Years)- Positive if 3 or more points; Administer PHQ-A if positive -   Q1: Little interest or pleasure in doing things Not at all   Q2: Feeling down, depressed or hopeless Several days   PHQ-2 Score 1       Abuse: Current or Past(Physical, Sexual or Emotional)- No  Do you feel safe in your environment? Yes    Social History     Tobacco Use     Smoking status: Former Smoker     Packs/day: 0.75     Years: 35.00     Pack years: 26.25     Types: Cigarettes     Smokeless tobacco: Never Used   Substance Use Topics     Alcohol use: Yes     Alcohol/week: 0.0 standard drinks     Alcohol Use 1/18/2022   Prescreen: >3 drinks/day or >7 drinks/week? No     Last  "PSA:   PSA   Date Value Ref Range Status   04/15/2021 2.88 0 - 4 ug/L Final     Comment:     Assay Method:  Chemiluminescence using Siemens Vista analyzer       Reviewed orders with patient. Reviewed health maintenance and updated orders accordingly - Yes    Reviewed and updated as needed this visit by clinical staff  Tobacco  Allergies  Meds  Problems  Med Hx  Surg Hx  Fam Hx         Reviewed and updated as needed this visit by Provider  Tobacco  Allergies  Meds  Problems  Med Hx  Surg Hx  Fam Hx        Review of Systems   Constitutional: Negative for chills and fever.   HENT: Positive for hearing loss. Negative for congestion, ear pain and sore throat.    Eyes: Negative for pain and visual disturbance.   Respiratory: Negative for cough and shortness of breath.    Cardiovascular: Positive for peripheral edema. Negative for chest pain and palpitations.   Gastrointestinal: Negative for abdominal pain, constipation, diarrhea, heartburn, hematochezia and nausea.   Genitourinary: Negative for dysuria, frequency, genital sores, hematuria, impotence and urgency.   Musculoskeletal: Positive for arthralgias. Negative for joint swelling and myalgias.   Skin: Negative for rash.   Neurological: Negative for dizziness, weakness, headaches and paresthesias.   Psychiatric/Behavioral: Negative for mood changes. The patient is nervous/anxious.        OBJECTIVE:   /88 (BP Location: Left arm, Patient Position: Sitting, Cuff Size: Adult Regular)   Pulse 70   Temp 97.4  F (36.3  C) (Tympanic)   Ht 1.676 m (5' 6\")   Wt 96.2 kg (212 lb)   SpO2 95%   BMI 34.22 kg/m    EXAM:  GENERAL: healthy, alert and no distress  EYES: Eyes grossly normal to inspection, PERRL and conjunctivae and sclerae normal  HENT: ear canals and TM's normal, nose and mouth without ulcers or lesions  NECK: no adenopathy, no asymmetry, masses, or scars and thyroid normal to palpation  RESP: lungs clear to auscultation - no rales, rhonchi or " wheezes  BREAST: normal without masses, tenderness or nipple discharge and no palpable axillary masses or adenopathy  CV: regular rate and rhythm, normal S1 S2, no S3 or S4, no murmur, click or rub, no peripheral edema and peripheral pulses strong  ABDOMEN: soft, nontender, no hepatosplenomegaly, no masses and bowel sounds normal   (male): normal male genitalia without lesions or urethral discharge, no hernia  MS: no gross musculoskeletal defects noted, no edema  SKIN: no suspicious lesions or rashes  NEURO: Normal strength and tone, mentation intact and speech normal  PSYCH: mentation appears normal, affect normal/bright  LYMPH: no cervical, supraclavicular, axillary, or inguinal adenopathy  RECTAL: declined exam  Diabetic foot exam: normal DP and PT pulses, no trophic changes or ulcerative lesions and normal sensory exam  Results for orders placed or performed in visit on 01/18/22   CBC with platelets     Status: Abnormal   Result Value Ref Range    WBC Count 7.5 4.0 - 11.0 10e3/uL    RBC Count 7.98 (H) 4.40 - 5.90 10e6/uL    Hemoglobin 15.8 13.3 - 17.7 g/dL    Hematocrit 49.3 40.0 - 53.0 %    MCV 62 (L) 78 - 100 fL    MCH 19.8 (L) 26.5 - 33.0 pg    MCHC 32.0 31.5 - 36.5 g/dL    RDW 18.3 (H) 10.0 - 15.0 %    Platelet Count 274 150 - 450 10e3/uL         ASSESSMENT/PLAN:   Routine general medical examination at a health care facility    Hyperlipidemia LDL goal <130  Controlled - continue medication(s).  - atorvastatin (LIPITOR) 20 MG tablet  Dispense: 90 tablet; Refill: 3  - Lipid panel reflex to direct LDL Fasting  - Lipid panel reflex to direct LDL Fasting    Hypertension goal BP (blood pressure) < 130/80  Controlled - continue medication(s).  - COMPREHENSIVE METABOLIC PANEL  - furosemide (LASIX) 20 MG tablet  Dispense: 90 tablet; Refill: 3  - lisinopril (ZESTRIL) 40 MG tablet  Dispense: 90 tablet; Refill: 3  - Albumin Random Urine Quantitative with Creat Ratio  - COMPREHENSIVE METABOLIC PANEL  - Albumin Random  "Urine Quantitative with Creat Ratio    Situational anxiety  Stable, ongoing stress, will continue medication.  - FLUoxetine (PROZAC) 10 MG capsule  Dispense: 90 capsule; Refill: 3    Screen for colon cancer  - Adult Gastro Ref - Procedure Only    Screening, deficiency anemia, iron  - CBC with platelets  - CBC with platelets    Screening for malignant neoplasm of prostate  - PSA, screen  - PSA, screen      COUNSELING:  Reviewed preventive health counseling, as reflected in patient instructions    Estimated body mass index is 34.22 kg/m  as calculated from the following:    Height as of this encounter: 1.676 m (5' 6\").    Weight as of this encounter: 96.2 kg (212 lb).  Weight management plan: Discussed healthy diet and exercise guidelines     reports that he has quit smoking. His smoking use included cigarettes. He has a 26.25 pack-year smoking history. He has never used smokeless tobacco.      Return in about 1 year (around 1/18/2023) for wellness exam with fasting labs with Chance Blair MD.         Chance Blair MD     [address] [address]  [email or web address]     Office: [phone]     "

## 2022-01-19 ASSESSMENT — ANXIETY QUESTIONNAIRES: GAD7 TOTAL SCORE: 1

## 2022-01-21 ENCOUNTER — TELEPHONE (OUTPATIENT)
Dept: FAMILY MEDICINE | Facility: CLINIC | Age: 62
End: 2022-01-21
Payer: COMMERCIAL

## 2022-01-21 DIAGNOSIS — R22.0 FACIAL SWELLING: ICD-10-CM

## 2022-01-21 NOTE — TELEPHONE ENCOUNTER
Called and spoke with patient.     Woke up and tooth was sore. Grinds teeth. Couldn't tell if tooth was cracked or not.  Sees dentist on 1/31 in [place].     Resent prescription to alternate pharmacy as [facility] pharmacy closes at 5.     Jewell Frazier RN  [facility]

## 2022-01-21 NOTE — TELEPHONE ENCOUNTER
[employer] is having issues refilling the amoxicillin and plavix due to it showing that [facility] has filled the prescription already. Fabi is requesting that deleted from system.     For further questions please contact pharmacy.    Thank you,    Elizabeth Olivia

## 2022-01-24 NOTE — TELEPHONE ENCOUNTER
Told our pharmacy to correct this     Joanna Romero RN, BSN  [facility]

## 2022-01-27 NOTE — RESULT ENCOUNTER NOTE
Please send result note    Here is a summary of your recent test results:  -Normal red blood cell (hgb) levels, normal white blood cell count and normal platelet levels.  -PSA (prostate specific antigen) test is normal.  This indicates a low likelihood of prostate cancer.  ADVISE: rechecking this in 1 year.  -Cholesterol levels are above your goal levels.  ADVISE: continuing your medication, a regular exercise program with at least 150 minutes of aerobic exercise per week, and eating a low saturated fat/low carbohydrate diet.  Also, you should recheck this fasting cholesterol panel in 6 months.  -Liver and gallbladder tests (ALT,AST, Alk phos,bilirubin) are normal.  -Kidney function (GFR) is normal.  -Sodium is normal.  -Potassium is normal.  -Calcium is elevated.  ADVISE: rechecking this in 1 month.  -Glucose (diabetic screening test) is normal.  -Microalbumin (urine protein) test is normal.  ADVISE: rechecking this annually.    For additional lab test information, labtestsonline.org is an excellent reference.    In addition, here is a list of due or overdue Health Maintenance reminders:  Pneumococcal Vaccine(1 of 4 - PCV13) Never done  Colorectal Cancer Screening Never done  LUNG CANCER SCREENING Never done  COVID-19 Vaccine(3 - Booster for Pfizer series) due on 10/27/2021    Please call us at [phone] (or use [employer]) to address the above recommendations if needed.           Thank you very much for trusting me and [facility].     Have a peaceful day.    Healthy regards,  Chance Blair MD

## 2022-03-15 ENCOUNTER — TRANSFERRED RECORDS (OUTPATIENT)
Dept: HEALTH INFORMATION MANAGEMENT | Facility: CLINIC | Age: 62
End: 2022-03-15
Payer: COMMERCIAL

## 2022-04-06 ENCOUNTER — TELEPHONE (OUTPATIENT)
Dept: FAMILY MEDICINE | Facility: CLINIC | Age: 62
End: 2022-04-06
Payer: COMMERCIAL

## 2022-04-06 NOTE — TELEPHONE ENCOUNTER
Situation:  Patient inhaled a large breath of  gas an hour ago that was very strong that it almost made him pass out.    Background:  Patient is a      Assessment:  No hard time breathing, does not feel short of breath  Throat was burning   Patient feels like he can take a deep breath  Patient has not been coughing  Patient feels high still one hour later.   no heart palpitations, no racing heart  A little dizziness still -able to walk around ok without worsening dizziness  Lungs do not feel like they are burning anymore, burning feeling in chest last half hour  Can breath through nose   No mouth sores developing   Skin on face is ok is not red   Voice sounds normal to patient   Patient is able to swallow ok    Recommendation: huddled with provider here at the clinic due to potential risk of throat and airway swelling     [facility] is closed to ed patient (patient knows location) insist on driving himself  instructed patient to call a friend or family and ask if someone can immediatly drive him. If not tell them he is driving to [employer].   Pull  Over and call 911 with difficulty breathing on the way or moderate dizziness.     Patient denied any other questions and agreed to go to the [facility] ED.    Celi SAUCEDO RN   [facility] Triage

## 2022-04-08 ENCOUNTER — TELEPHONE (OUTPATIENT)
Dept: FAMILY MEDICINE | Facility: CLINIC | Age: 62
End: 2022-04-08
Payer: COMMERCIAL

## 2022-04-08 NOTE — TELEPHONE ENCOUNTER
Reason for Call:  Medication or medication refill:    Do you use a [facility]?  Name of the pharmacy and phone number for the current request:  [facility] - [phone]    Name of the medication requested: loperamide     Other request: chronic diarrhea for the last two days. Patient is requesting to be put on something to help with this and was referred by a friend to try loperamide. He's unable to make an appointment as he works 7 days a week. He'd like something today. Please advise.     Can we leave a detailed message on this number? YES    Phone number patient can be reached at: Cell number on file:    Telephone Information:   Mobile [phone]       Best Time: Any     Call taken on 4/8/2022 at 9:07 AM by Bill Odell

## 2022-04-08 NOTE — TELEPHONE ENCOUNTER
Spoke with patient to get more information. He states that when he does not eat well, eating junk or not eating he will get diarrhea.  He ate a large salad at the [employer] and started having diarrhea. He drinks a pot or more of coffee a day along with pop.     Advised to eat a bland diet for the next couple days, decrease caffeine/coffee intake, increase water and fluids, and can try OTC imodium.  If pt wants a prescription medication he does need an appointment of some kind.    He opted to try the OTC medication.    Jaun MIJARES RN, BSN

## 2022-06-13 ENCOUNTER — TELEPHONE (OUTPATIENT)
Dept: FAMILY MEDICINE | Facility: CLINIC | Age: 62
End: 2022-06-13
Payer: COMMERCIAL

## 2022-06-13 NOTE — TELEPHONE ENCOUNTER
Pt calls.    He tested for covid at home.  He said there is a faint line.  Advised it sounds like he is positive.  Advised he could always do a pcr test to double check.  He thinks it is positive.  Advised of quarantine guidelines.  Advised to call or be seen if any concerning symptoms like chest pain or problems breathing.    Will advise infection prevention.

## 2022-07-18 ENCOUNTER — TRANSFERRED RECORDS (OUTPATIENT)
Dept: HEALTH INFORMATION MANAGEMENT | Facility: CLINIC | Age: 62
End: 2022-07-18

## 2022-08-01 ENCOUNTER — NURSE TRIAGE (OUTPATIENT)
Dept: FAMILY MEDICINE | Facility: CLINIC | Age: 62
End: 2022-08-01

## 2022-08-01 NOTE — TELEPHONE ENCOUNTER
"LOV 1/18/2022    SEE IN OFFICE WITHIN 3 DAYS:   * You need to be examined.   * Let me give you an appointment.    Pt stated that he is not coming in to be seen.   He will continue to soak his toe in Epsom salts and take ibuprofen. Then he hung up     Joanna Romero RN, BSN  [facility]    Reason for Disposition    MODERATE pain (e.g., interferes with normal activities, limping) and present > 3 days    Pain in the big toe joint    Additional Information    Negative: Followed an ankle or foot injury    Negative: Ankle pain is the main symptom    Negative: Entire foot is cool or blue in comparison to other foot    Negative: Purple or black skin on foot or toe    Negative: Red area or streak and fever    Negative: Swollen foot and fever    Negative: Patient sounds very sick or weak to the triager    Negative: SEVERE pain (e.g., excruciating, unable to do any normal activities)    Negative: Looks like a boil, infected sore, deep ulcer, or other infected rash (spreading redness, pus)    Negative: Swollen foot (Exceptions: localized bump from bunions, calluses, insect bite, sting)    Negative: Numbness in one foot (i.e., loss of sensation)    Negative: MILD pain (e.g., does not interfere with normal activities) and present > 7 days    Negative: Foot pain is a chronic symptom (recurrent or ongoing AND lasting > 4 weeks)    Negative: Caused by known bunions, plantar wart, or flat feet    Answer Assessment - Initial Assessment Questions  1. ONSET: \"When did the pain start?\"       3 days ago     2. LOCATION: \"Where is the pain located?\"       Right big toe     3. PAIN: \"How bad is the pain?\"    (Scale 1-10; or mild, moderate, severe)    -  MILD (1-3): doesn't interfere with normal activities     -  MODERATE (4-7): interferes with normal activities (e.g., work or school) or awakens from sleep, limping     -  SEVERE (8-10): excruciating pain, unable to do any normal activities, unable to walk      " "Moderate - painful to walk unable to do normal activities without a lot of pain     4. WORK OR EXERCISE: \"Has there been any recent work or exercise that involved this part of the body?\"       None    5. CAUSE: \"What do you think is causing the foot pain?\"      Gout     6. OTHER SYMPTOMS: \"Do you have any other symptoms?\" (e.g., leg pain, rash, fever, numbness)      Denies: leg pain, fever, rash , numbness     7. PREGNANCY: \"Is there any chance you are pregnant?\" \"When was your last menstrual period?\"      None    Protocols used: FOOT PAIN-A-OH      "

## 2023-02-15 ENCOUNTER — ALLIED HEALTH/NURSE VISIT (OUTPATIENT)
Dept: FAMILY MEDICINE | Facility: CLINIC | Age: 63
End: 2023-02-15
Payer: COMMERCIAL

## 2023-02-15 VITALS — SYSTOLIC BLOOD PRESSURE: 178 MMHG | DIASTOLIC BLOOD PRESSURE: 105 MMHG

## 2023-02-15 DIAGNOSIS — I10 HYPERTENSION GOAL BP (BLOOD PRESSURE) < 130/80: Primary | ICD-10-CM

## 2023-02-15 PROCEDURE — 99207 PR NO CHARGE NURSE ONLY: CPT | Performed by: FAMILY MEDICINE

## 2023-02-15 NOTE — PROGRESS NOTES
Estevan Aaron was evaluated at [facility] on February 15, 2023 at which time his blood pressure was:    BP Readings from Last 3 Encounters:   02/15/23 (!) 178/105   01/18/22 138/88   01/04/22 138/80     Pulse Readings from Last 3 Encounters:   01/18/22 70   01/04/22 73   04/15/21 68       Reviewed lifestyle modifications for blood pressure control and reduction: including making healthy food choices, managing weight, getting regular exercise, smoking cessation, reducing alcohol consumption, monitoring blood pressure regularly.     Symptoms: None    BP Goal:Other: <130/80    BP Assessment:  BP too high    Potential Reasons for BP too high: Dose of BP medication too low. Estevan stated that starting at 5:30am he drank 2 tall mugs of coffee and finished in the parking lot prior to coming in for BP check at 8:45am. He also said he is under a significant amount of stress / anxiety. He scheduled PE with Dr. Blair 2/17/2023. He did not want to wait to see if his BP came down. Also currently smokes.    BP Follow-Up Plan: Referral to PCP    Recommendation to Provider: may need additional medication added to regimen if BP doesn't come down to goal.     Note completed by: Laila Mane, PharmD  [facility]  PH: [phone]

## 2023-02-17 ENCOUNTER — OFFICE VISIT (OUTPATIENT)
Dept: FAMILY MEDICINE | Facility: CLINIC | Age: 63
End: 2023-02-17
Payer: COMMERCIAL

## 2023-02-17 ENCOUNTER — LAB (OUTPATIENT)
Dept: FAMILY MEDICINE | Facility: CLINIC | Age: 63
End: 2023-02-17

## 2023-02-17 VITALS
HEIGHT: 66 IN | TEMPERATURE: 97.1 F | SYSTOLIC BLOOD PRESSURE: 170 MMHG | RESPIRATION RATE: 16 BRPM | HEART RATE: 69 BPM | BODY MASS INDEX: 34.55 KG/M2 | OXYGEN SATURATION: 95 % | WEIGHT: 215 LBS | DIASTOLIC BLOOD PRESSURE: 90 MMHG

## 2023-02-17 DIAGNOSIS — Z12.11 SCREEN FOR COLON CANCER: ICD-10-CM

## 2023-02-17 DIAGNOSIS — Z00.00 ROUTINE GENERAL MEDICAL EXAMINATION AT A HEALTH CARE FACILITY: Primary | ICD-10-CM

## 2023-02-17 DIAGNOSIS — Z87.891 PERSONAL HISTORY OF TOBACCO USE: ICD-10-CM

## 2023-02-17 DIAGNOSIS — I10 HYPERTENSION GOAL BP (BLOOD PRESSURE) < 130/80: ICD-10-CM

## 2023-02-17 DIAGNOSIS — D56.9 THALASSEMIA, UNSPECIFIED TYPE: ICD-10-CM

## 2023-02-17 DIAGNOSIS — E78.5 HYPERLIPIDEMIA LDL GOAL <100: ICD-10-CM

## 2023-02-17 DIAGNOSIS — Z12.5 SCREENING FOR PROSTATE CANCER: ICD-10-CM

## 2023-02-17 DIAGNOSIS — H91.93 DECREASED HEARING OF BOTH EARS: ICD-10-CM

## 2023-02-17 DIAGNOSIS — F41.8 SITUATIONAL ANXIETY: ICD-10-CM

## 2023-02-17 LAB
ALBUMIN SERPL BCG-MCNC: 4.7 G/DL (ref 3.5–5.2)
ALP SERPL-CCNC: 95 U/L (ref 40–129)
ALT SERPL W P-5'-P-CCNC: 30 U/L (ref 10–50)
ANION GAP SERPL CALCULATED.3IONS-SCNC: 12 MMOL/L (ref 7–15)
AST SERPL W P-5'-P-CCNC: 33 U/L (ref 10–50)
BILIRUB SERPL-MCNC: 0.9 MG/DL
BUN SERPL-MCNC: 14.4 MG/DL (ref 8–23)
CALCIUM SERPL-MCNC: 10.9 MG/DL (ref 8.8–10.2)
CHLORIDE SERPL-SCNC: 104 MMOL/L (ref 98–107)
CHOLEST SERPL-MCNC: 187 MG/DL
CREAT SERPL-MCNC: 0.89 MG/DL (ref 0.67–1.17)
CREAT UR-MCNC: 122 MG/DL
DEPRECATED HCO3 PLAS-SCNC: 22 MMOL/L (ref 22–29)
ERYTHROCYTE [DISTWIDTH] IN BLOOD BY AUTOMATED COUNT: 18.8 % (ref 10–15)
GFR SERPL CREATININE-BSD FRML MDRD: >90 ML/MIN/1.73M2
GLUCOSE SERPL-MCNC: 78 MG/DL (ref 70–99)
HCT VFR BLD AUTO: 49.5 % (ref 40–53)
HDLC SERPL-MCNC: 30 MG/DL
HGB BLD-MCNC: 16 G/DL (ref 13.3–17.7)
LDLC SERPL CALC-MCNC: 126 MG/DL
MCH RBC QN AUTO: 20 PG (ref 26.5–33)
MCHC RBC AUTO-ENTMCNC: 32.3 G/DL (ref 31.5–36.5)
MCV RBC AUTO: 62 FL (ref 78–100)
MICROALBUMIN UR-MCNC: 36.2 MG/L
MICROALBUMIN/CREAT UR: 29.67 MG/G CR (ref 0–17)
NONHDLC SERPL-MCNC: 157 MG/DL
PLATELET # BLD AUTO: 209 10E3/UL (ref 150–450)
POTASSIUM SERPL-SCNC: 4.2 MMOL/L (ref 3.4–5.3)
PROT SERPL-MCNC: 6.9 G/DL (ref 6.4–8.3)
PSA SERPL-MCNC: 2.4 NG/ML (ref 0–4.5)
RBC # BLD AUTO: >8 10E6/UL (ref 4.4–5.9)
SODIUM SERPL-SCNC: 138 MMOL/L (ref 136–145)
TRIGL SERPL-MCNC: 157 MG/DL
WBC # BLD AUTO: 8.1 10E3/UL (ref 4–11)

## 2023-02-17 PROCEDURE — 82570 ASSAY OF URINE CREATININE: CPT | Performed by: FAMILY MEDICINE

## 2023-02-17 PROCEDURE — 85027 COMPLETE CBC AUTOMATED: CPT | Performed by: FAMILY MEDICINE

## 2023-02-17 PROCEDURE — 80061 LIPID PANEL: CPT | Performed by: FAMILY MEDICINE

## 2023-02-17 PROCEDURE — 80053 COMPREHEN METABOLIC PANEL: CPT | Performed by: FAMILY MEDICINE

## 2023-02-17 PROCEDURE — 99214 OFFICE O/P EST MOD 30 MIN: CPT | Mod: 25 | Performed by: FAMILY MEDICINE

## 2023-02-17 PROCEDURE — G0296 VISIT TO DETERM LDCT ELIG: HCPCS | Performed by: FAMILY MEDICINE

## 2023-02-17 PROCEDURE — 82043 UR ALBUMIN QUANTITATIVE: CPT | Performed by: FAMILY MEDICINE

## 2023-02-17 PROCEDURE — 99396 PREV VISIT EST AGE 40-64: CPT | Performed by: FAMILY MEDICINE

## 2023-02-17 PROCEDURE — G0103 PSA SCREENING: HCPCS | Performed by: FAMILY MEDICINE

## 2023-02-17 PROCEDURE — 36415 COLL VENOUS BLD VENIPUNCTURE: CPT | Performed by: FAMILY MEDICINE

## 2023-02-17 RX ORDER — FUROSEMIDE 20 MG
20 TABLET ORAL DAILY
Qty: 90 TABLET | Refills: 3 | Status: SHIPPED | OUTPATIENT
Start: 2023-02-17 | End: 2023-03-13

## 2023-02-17 RX ORDER — ATORVASTATIN CALCIUM 40 MG/1
40 TABLET, FILM COATED ORAL DAILY
Qty: 90 TABLET | Refills: 3 | Status: SHIPPED | OUTPATIENT
Start: 2023-02-17 | End: 2024-02-20

## 2023-02-17 RX ORDER — AMLODIPINE BESYLATE 5 MG/1
5 TABLET ORAL DAILY
Qty: 90 TABLET | Refills: 3 | Status: SHIPPED | OUTPATIENT
Start: 2023-02-17 | End: 2023-02-28

## 2023-02-17 RX ORDER — LISINOPRIL 40 MG/1
40 TABLET ORAL DAILY
Qty: 90 TABLET | Refills: 3 | Status: SHIPPED | OUTPATIENT
Start: 2023-02-17 | End: 2023-05-12

## 2023-02-17 RX ORDER — FLUOXETINE 10 MG/1
10 CAPSULE ORAL DAILY
Qty: 90 CAPSULE | Refills: 3 | Status: SHIPPED | OUTPATIENT
Start: 2023-02-17 | End: 2023-02-17

## 2023-02-17 ASSESSMENT — ENCOUNTER SYMPTOMS
WEAKNESS: 0
NERVOUS/ANXIOUS: 0
PARESTHESIAS: 0
HEMATURIA: 0
NAUSEA: 0
JOINT SWELLING: 0
SHORTNESS OF BREATH: 0
EYE PAIN: 0
PALPITATIONS: 0
DYSURIA: 0
SORE THROAT: 0
CONSTIPATION: 0
MYALGIAS: 0
FEVER: 0
HEADACHES: 0
CHILLS: 0
HEMATOCHEZIA: 0
FREQUENCY: 1
DIARRHEA: 0
ARTHRALGIAS: 1
ABDOMINAL PAIN: 0
HEARTBURN: 0
COUGH: 0
DIZZINESS: 0

## 2023-02-17 ASSESSMENT — ANXIETY QUESTIONNAIRES
4. TROUBLE RELAXING: NOT AT ALL
GAD7 TOTAL SCORE: 9
GAD7 TOTAL SCORE: 9
5. BEING SO RESTLESS THAT IT IS HARD TO SIT STILL: NOT AT ALL
2. NOT BEING ABLE TO STOP OR CONTROL WORRYING: NEARLY EVERY DAY
IF YOU CHECKED OFF ANY PROBLEMS ON THIS QUESTIONNAIRE, HOW DIFFICULT HAVE THESE PROBLEMS MADE IT FOR YOU TO DO YOUR WORK, TAKE CARE OF THINGS AT HOME, OR GET ALONG WITH OTHER PEOPLE: SOMEWHAT DIFFICULT
7. FEELING AFRAID AS IF SOMETHING AWFUL MIGHT HAPPEN: NOT AT ALL
GAD7 TOTAL SCORE: 9
1. FEELING NERVOUS, ANXIOUS, OR ON EDGE: NEARLY EVERY DAY
6. BECOMING EASILY ANNOYED OR IRRITABLE: NOT AT ALL
7. FEELING AFRAID AS IF SOMETHING AWFUL MIGHT HAPPEN: NOT AT ALL
8. IF YOU CHECKED OFF ANY PROBLEMS, HOW DIFFICULT HAVE THESE MADE IT FOR YOU TO DO YOUR WORK, TAKE CARE OF THINGS AT HOME, OR GET ALONG WITH OTHER PEOPLE?: SOMEWHAT DIFFICULT
3. WORRYING TOO MUCH ABOUT DIFFERENT THINGS: NEARLY EVERY DAY

## 2023-02-17 ASSESSMENT — PATIENT HEALTH QUESTIONNAIRE - PHQ9
SUM OF ALL RESPONSES TO PHQ QUESTIONS 1-9: 0
SUM OF ALL RESPONSES TO PHQ QUESTIONS 1-9: 0
10. IF YOU CHECKED OFF ANY PROBLEMS, HOW DIFFICULT HAVE THESE PROBLEMS MADE IT FOR YOU TO DO YOUR WORK, TAKE CARE OF THINGS AT HOME, OR GET ALONG WITH OTHER PEOPLE: NOT DIFFICULT AT ALL

## 2023-02-17 NOTE — PROGRESS NOTES
SUBJECTIVE:   CC: Santos is an 62 year old who presents for preventative health visit.       Patient has been advised of split billing requirements and indicates understanding: Yes     Healthy Habits:     Getting at least 3 servings of Calcium per day:  Yes    Bi-annual eye exam:  NO    Dental care twice a year:  Yes    Sleep apnea or symptoms of sleep apnea:  Daytime drowsiness    Diet:  Low salt    Frequency of exercise:  2-3 days/week    Duration of exercise:  N/A    Taking medications regularly:  Yes    Medication side effects:  None    PHQ-2 Total Score: 0    Additional concerns today:  No    Hyperlipidemia Follow-Up      Are you regularly taking any medication or supplement to lower your cholesterol?   Yes- atorvastatin (LIPITOR) 20 MG tablet    Are you having muscle aches or other side effects that you think could be caused by your cholesterol lowering medication?  No    Hypertension Follow-up      Do you check your blood pressure regularly outside of the clinic? No     Are you following a low salt diet? No - Patient frequently eats out and eats pre-packaged meals and soups.     Are your blood pressures ever more than 140 on the top number (systolic) OR more   than 90 on the bottom number (diastolic), for example 140/90? n/a    BP Readings from Last 2 Encounters:   02/17/23 (!) 170/90   02/15/23 (!) 178/105     LDL Cholesterol Calculated (mg/dL)   Date Value   01/18/2022 123 (H)   10/01/2021 114 (H)   03/14/2019 123 (H)   01/05/2018 172 (H)     Depression and Anxiety Follow-Up    How are you doing with your depression since your last visit? No change    How are you doing with your anxiety since your last visit?  Worsened     Are you having other symptoms that might be associated with depression or anxiety? No    Have you had a significant life event? No     Do you have any concerns with your use of alcohol or other drugs? No    Social History     Tobacco Use     Smoking status: Every Day     Packs/day: 1.00      Years: 35.00     Pack years: 35.00     Types: Cigarettes     Smokeless tobacco: Never   Vaping Use     Vaping Use: Never used   Substance Use Topics     Alcohol use: Yes     Alcohol/week: 0.0 standard drinks     Drug use: No     Comment: no herbal meds either      PHQ 1/25/2021 1/18/2022 2/17/2023   PHQ-9 Total Score 9 2 0   Q9: Thoughts of better off dead/self-harm past 2 weeks Not at all Not at all Not at all     GEORGINA-7 SCORE 1/25/2021 1/18/2022 2/17/2023   Total Score - - 9 (mild anxiety)   Total Score 14 1 9     Last PHQ-9 2/17/2023   1.  Little interest or pleasure in doing things 0   2.  Feeling down, depressed, or hopeless 0   3.  Trouble falling or staying asleep, or sleeping too much 0   4.  Feeling tired or having little energy 0   5.  Poor appetite or overeating 0   6.  Feeling bad about yourself 0   7.  Trouble concentrating 0   8.  Moving slowly or restless 0   Q9: Thoughts of better off dead/self-harm past 2 weeks 0   PHQ-9 Total Score 0   Difficulty at work, home, or with people -     GEORGINA-7  2/17/2023   1. Feeling nervous, anxious, or on edge 3   2. Not being able to stop or control worrying 3   3. Worrying too much about different things 3   4. Trouble relaxing 0   5. Being so restless that it is hard to sit still 0   6. Becoming easily annoyed or irritable 0   7. Feeling afraid, as if something awful might happen 0   GEORGINA-7 Total Score 9   If you checked any problems, how difficult have they made it for you to do your work, take care of things at home, or get along with other people? Somewhat difficult     Suicide Assessment Five-step Evaluation and Treatment (SAFE-T)    Have you ever done Advance Care Planning? (For example, a Health Directive, POLST, or a discussion with a medical provider or your loved ones about your wishes): No, advance care planning information given to patient to review.  Patient declined advance care planning discussion at this time.    Social History     Tobacco Use      Smoking status: Every Day     Packs/day: 1.00     Years: 35.00     Pack years: 35.00     Types: Cigarettes     Smokeless tobacco: Never   Substance Use Topics     Alcohol use: Yes     Alcohol/week: 0.0 standard drinks     If you drink alcohol do you typically have >3 drinks per day or >7 drinks per week? No    Alcohol Use 2/17/2023   Prescreen: >3 drinks/day or >7 drinks/week? No   Prescreen: >3 drinks/day or >7 drinks/week? -     Last PSA:   PSA   Date Value Ref Range Status   04/15/2021 2.88 0 - 4 ug/L Final     Comment:     Assay Method:  Chemiluminescence using Siemens Vista analyzer     Prostate Specific Antigen Screen   Date Value Ref Range Status   01/18/2022 3.00 0.00 - 4.00 ug/L Final     Reviewed orders with patient. Reviewed health maintenance and updated orders accordingly - Yes    Reviewed and updated as needed this visit by clinical staff   Tobacco  Allergies  Meds              Review of Systems   Constitutional: Negative for chills and fever.   HENT: Positive for hearing loss. Negative for congestion, ear pain and sore throat.    Eyes: Negative for pain and visual disturbance.   Respiratory: Negative for cough and shortness of breath.    Cardiovascular: Negative for chest pain, palpitations and peripheral edema.   Gastrointestinal: Negative for abdominal pain, constipation, diarrhea, heartburn, hematochezia and nausea.   Genitourinary: Positive for frequency. Negative for dysuria, genital sores, hematuria, impotence, penile discharge and urgency.   Musculoskeletal: Positive for arthralgias. Negative for joint swelling and myalgias.   Skin: Negative for rash.   Neurological: Negative for dizziness, weakness, headaches and paresthesias.   Psychiatric/Behavioral: Negative for mood changes. The patient is not nervous/anxious.      OBJECTIVE:   BP (!) 170/90 (BP Location: Left arm, Patient Position: Sitting, Cuff Size: Adult Large)   Pulse 69   Temp 97.1  F (36.2  C) (Tympanic)   Resp 16   Ht 1.67 m  "(5' 5.75\")   Wt 97.5 kg (215 lb)   SpO2 95%   BMI 34.97 kg/m    EXAM:  GENERAL: healthy, alert and no distress  EYES: Eyes grossly normal to inspection, PERRL and conjunctivae and sclerae normal  HENT: ear canals and TM's normal, nose and mouth without ulcers or lesions  NECK: no adenopathy, no asymmetry, masses, or scars and thyroid normal to palpation  RESP: lungs clear to auscultation - no rales, rhonchi or wheezes  BREAST: normal without masses, tenderness or nipple discharge and no palpable axillary masses or adenopathy  CV: regular rate and rhythm, normal S1 S2, no S3 or S4, no murmur, click or rub, no peripheral edema and peripheral pulses strong  ABDOMEN: soft, nontender, no hepatosplenomegaly, no masses and bowel sounds normal  MS: no gross musculoskeletal defects noted, no edema  SKIN: no suspicious lesions or rashes  NEURO: Normal strength and tone, mentation intact and speech normal  PSYCH: mentation appears normal, affect normal/bright  LYMPH: no cervical, supraclavicular, axillary, or inguinal adenopathy    ASSESSMENT/PLAN:   Routine general medical examination at a health care facility  - Declined colonoscopy; ordered cologuard   - Declines routine immunizations     Hypertension goal BP (blood pressure) < 130/80  Poorly controlled hypertension with SBP in the high 160's-170's. No symptoms of end organ damage on history or exam. Patient's diet consists of eating out and pre-made meals. Discussed importance of low-salt diet. Will add Amlodipine onto current regimen. BP recheck in 2 weeks.   - COMPREHENSIVE METABOLIC PANEL  - Albumin Random Urine Quantitative with Creat Ratio  - furosemide (LASIX) 20 MG tablet  Dispense: 90 tablet; Refill: 3  - lisinopril (ZESTRIL) 40 MG tablet  Dispense: 90 tablet; Refill: 3  - amLODIPine (NORVASC) 5 MG tablet  Dispense: 90 tablet; Refill: 3    Hyperlipidemia LDL goal <100  - Lipid panel reflex to direct LDL Non-fasting  - atorvastatin (LIPITOR) 40 MG tablet " "    Situational anxiety  Increased symptoms of anxiety with recent issues related to work. Will increase fluoxetine from 10 mg to 20 mg.   - FLUoxetine (PROZAC) 20 MG capsule     Thalassemia, unspecified type  - CBC with Platelets    Decreased hearing of both ears  - Adult ENT  Referral    Screening for prostate cancer  - PROSTATE SPEC ANTIGEN SCREEN    Screen for colon cancer  Patient does not want to do colonoscopies.   - COLOGUARD([employer])    Personal history of tobacco use  Cessation recommended.  History of smoking ~1.5 ppd for 35 years.   - Prof fee: Shared Decision Making for Lung Cancer Screening  - CT Chest Lung Cancer Scrn Low Dose wo    COUNSELING:  Reviewed preventive health counseling, as reflected in patient instructions    Estimated body mass index is 34.97 kg/m  as calculated from the following:    Height as of this encounter: 1.67 m (5' 5.75\").    Weight as of this encounter: 97.5 kg (215 lb).  Weight management plan: Discussed healthy diet and exercise guidelines     reports that he has been smoking cigarettes. He has a 35.00 pack-year smoking history. He has never used smokeless tobacco.  Tobacco Cessation Action Plan: Information offered: Patient not interested at this time    No follow-ups on file.     Kristi Alas MS3,  has participated in the care of this patient.     Provider Disclosure:  I agree with above History, Review of Systems, Physical exam and Plan.  I have reviewed the content of the documentation and have edited it as needed. I have personally performed the services documented here and the documentation accurately represents those services and the decisions I have made.      Electronically signed by:          Chance Blair M.D.   "

## 2023-02-17 NOTE — PATIENT INSTRUCTIONS
Preventive Health Recommendations  Male Ages 50 - 64    Yearly exam:             See your health care provider every year in order to  o   Review health changes.   o   Discuss preventive care.    o   Review your medicines if your doctor has prescribed any.     Have a cholesterol test every 5 years, or more frequently if you are at risk for high cholesterol/heart disease.     Have a diabetes test (fasting glucose) every three years. If you are at risk for diabetes, you should have this test more often.     Have a colonoscopy at age 50, or have a yearly FIT test (stool test). These exams will check for colon cancer.      Talk with your health care provider about whether or not a prostate cancer screening test (PSA) is right for you.    You should be tested each year for STDs (sexually transmitted diseases), if you re at risk.     Shots: Get a flu shot each year. Get a tetanus shot every 10 years.     Nutrition:    Eat at least 5 servings of fruits and vegetables daily.     Eat whole-grain bread, whole-wheat pasta and brown rice instead of white grains and rice.     Get adequate Calcium and Vitamin D.     Lifestyle    Exercise for at least 150 minutes a week (30 minutes a day, 5 days a week). This will help you control your weight and prevent disease.     Limit alcohol to one drink per day.     No smoking.     Wear sunscreen to prevent skin cancer.     See your dentist every six months for an exam and cleaning.     See your eye doctor every 1 to 2 years.    Lung Cancer Screening   Frequently Asked Questions  If you are at high-risk for lung cancer, getting screened with low-dose computed tomography (LDCT) every year can help save your life. This handout offers answers to some of the most common questions about lung cancer screening. If you have other questions, please call [phone] ([phone]).     What is it?  Lung cancer screening uses special X-ray technology to create an image of your lung tissue.  The exam is quick and easy and takes less than 10 seconds. We don t give you any medicine or use any needles. You can eat before and after the exam. You don t need to change your clothes as long as the clothing on your chest doesn t contain metal. But, you do need to be able to hold your breath for at least 6 seconds during the exam.    What is the goal of lung cancer screening?  The goal of lung cancer screening is to save lives. Many times, lung cancer is not found until a person starts having physical symptoms. Lung cancer screening can help detect lung cancer in the earliest stages when it may be easier to treat.    Who should be screened for lung cancer?  We suggest lung cancer screening for anyone who is at high-risk for lung cancer. You are in the high-risk group if you:      are between the ages of 55 and 79, and    have smoked at least 1 pack of cigarettes a day for 20 or more years, and    still smoke or have quit within the past 15 years.    However, if you have a new cough or shortness of breath, you should talk to your doctor before being screened.    Why does it matter if I have symptoms?  Certain symptoms can be a sign that you have a condition in your lungs that should be checked and treated by your doctor. These symptoms include fever, chest pain, a new or changing cough, shortness of breath that you have never felt before, coughing up blood or unexplained weight loss. Having any of these symptoms can greatly affect the results of lung cancer screening.       Should all smokers get an LDCT lung cancer screening exam?  It depends. Lung cancer screening is for a very specific group of men and women who have a history of heavy smoking over a long period of time (see  Who should be screened for lung cancer  above).  I am in the high-risk group, but have been diagnosed with cancer in the past. Is LDCT lung cancer screening right for me?  In some cases, you should not have LDCT lung screening, such as  when your doctor is already following your cancer with CT scan studies. Your doctor will help you decide if LDCT lung screening is right for you.  Do I need to have a screening exam every year?  Yes. If you are in the high-risk group described earlier, you should get an LDCT lung cancer screening exam every year until you are 79, or are no longer willing or able to undergo screening and possible procedures to diagnose and treat lung cancer.  How effective is LDCT at preventing death from lung cancer?  Studies have shown that LDCT lung cancer screening can lower the risk of death from lung cancer by 20 percent in people who are at high-risk.  What are the risks?  There are some risks and limitations of LDCT lung cancer screening. We want to make sure you understand the risks and benefits, so please let us know if you have any questions. Your doctor may want to talk with you more about these risks.    Radiation exposure: As with any exam that uses radiation, there is a very small increased risk of cancer. The amount of radiation in LDCT is small--about the same amount a person would get from a mammogram. Your doctor orders the exam when he or she feels the potential benefits outweigh the risks.    False negatives: No test is perfect, including LDCT. It is possible that you may have a medical condition, including lung cancer, that is not found during your exam. This is called a false negative result.    False positives and more testing: LDCT very often finds something in the lung that could be cancer, but in fact is not. This is called a false positive result. False positive tests often cause anxiety. To make sure these findings are not cancer, you may need to have more tests. These tests will be done only if you give us permission. Sometimes patients need a treatment that can have side effects, such as a biopsy. For more information on false positives, see  What can I expect from the results?     Findings not related  to lung cancer: Your LDCT exam also takes pictures of areas of your body next to your lungs. In a very small number of cases, the CT scan will show an abnormal finding in one of these areas, such as your kidneys, adrenal glands, liver or thyroid. This finding may not be serious, but you may need more tests. Your doctor can help you decide what other tests you may need, if any.  What can I expect from the results?  About 1 out of 4 LDCT exams will find something that may need more tests. Most of the time, these findings are lung nodules. Lung nodules are very small collections of tissue in the lung. These nodules are very common, and the vast majority--more than 97 percent--are not cancer (benign). Most are normal lymph nodes or small areas of scarring from past infections.  But, if a small lung nodule is found to be cancer, the cancer can be cured more than 90 percent of the time. To know if the nodule is cancer, we may need to get more images before your next yearly screening exam. If the nodule has suspicious features (for example, it is large, has an odd shape or grows over time), we will refer you to a specialist for further testing.  Will my doctor also get the results?  Yes. Your doctor will get a copy of your results.  Is it okay to keep smoking now that there s a cancer screening exam?  No. Tobacco is one of the strongest cancer-causing agents. It causes not only lung cancer, but other cancers and cardiovascular (heart) diseases as well. The damage caused by smoking builds over time. This means that the longer you smoke, the higher your risk of disease. While it is never too late to quit, the sooner you quit, the better.  Where can I find help to quit smoking?  The best way to prevent lung cancer is to stop smoking. If you have already quit smoking, congratulations and keep it up! For help on quitting smoking, please call [employer] at 8-647-QUIT-NOW (1-583.687.3335) or the American Cancer Society at  1-636.598.1433 to find local resources near you.  One-on-one health coaching:  If you d prefer to work individually with a health care provider on tobacco cessation, we offer:      Medication Therapy Management:  Our specially trained pharmacists work closely with you and your doctor to help you quit smoking.  Call [phone] or [phone] (toll free).    Lung Cancer Screening   Frequently Asked Questions  If you are at high-risk for lung cancer, getting screened with low-dose computed tomography (LDCT) every year can help save your life. This handout offers answers to some of the most common questions about lung cancer screening. If you have other questions, please call [phone] ([phone]).     What is it?  Lung cancer screening uses special X-ray technology to create an image of your lung tissue. The exam is quick and easy and takes less than 10 seconds. We don t give you any medicine or use any needles. You can eat before and after the exam. You don t need to change your clothes as long as the clothing on your chest doesn t contain metal. But, you do need to be able to hold your breath for at least 6 seconds during the exam.    What is the goal of lung cancer screening?  The goal of lung cancer screening is to save lives. Many times, lung cancer is not found until a person starts having physical symptoms. Lung cancer screening can help detect lung cancer in the earliest stages when it may be easier to treat.    Who should be screened for lung cancer?  We suggest lung cancer screening for anyone who is at high-risk for lung cancer. You are in the high-risk group if you:      are between the ages of 55 and 79, and    have smoked at least 1 pack of cigarettes a day for 20 or more years, and    still smoke or have quit within the past 15 years.    However, if you have a new cough or shortness of breath, you should talk to your doctor before being screened.    Why does it matter if I have  symptoms?  Certain symptoms can be a sign that you have a condition in your lungs that should be checked and treated by your doctor. These symptoms include fever, chest pain, a new or changing cough, shortness of breath that you have never felt before, coughing up blood or unexplained weight loss. Having any of these symptoms can greatly affect the results of lung cancer screening.       Should all smokers get an LDCT lung cancer screening exam?  It depends. Lung cancer screening is for a very specific group of men and women who have a history of heavy smoking over a long period of time (see  Who should be screened for lung cancer  above).  I am in the high-risk group, but have been diagnosed with cancer in the past. Is LDCT lung cancer screening right for me?  In some cases, you should not have LDCT lung screening, such as when your doctor is already following your cancer with CT scan studies. Your doctor will help you decide if LDCT lung screening is right for you.  Do I need to have a screening exam every year?  Yes. If you are in the high-risk group described earlier, you should get an LDCT lung cancer screening exam every year until you are 79, or are no longer willing or able to undergo screening and possible procedures to diagnose and treat lung cancer.  How effective is LDCT at preventing death from lung cancer?  Studies have shown that LDCT lung cancer screening can lower the risk of death from lung cancer by 20 percent in people who are at high-risk.  What are the risks?  There are some risks and limitations of LDCT lung cancer screening. We want to make sure you understand the risks and benefits, so please let us know if you have any questions. Your doctor may want to talk with you more about these risks.    Radiation exposure: As with any exam that uses radiation, there is a very small increased risk of cancer. The amount of radiation in LDCT is small--about the same amount a person would get from a  mammogram. Your doctor orders the exam when he or she feels the potential benefits outweigh the risks.    False negatives: No test is perfect, including LDCT. It is possible that you may have a medical condition, including lung cancer, that is not found during your exam. This is called a false negative result.    False positives and more testing: LDCT very often finds something in the lung that could be cancer, but in fact is not. This is called a false positive result. False positive tests often cause anxiety. To make sure these findings are not cancer, you may need to have more tests. These tests will be done only if you give us permission. Sometimes patients need a treatment that can have side effects, such as a biopsy. For more information on false positives, see  What can I expect from the results?     Findings not related to lung cancer: Your LDCT exam also takes pictures of areas of your body next to your lungs. In a very small number of cases, the CT scan will show an abnormal finding in one of these areas, such as your kidneys, adrenal glands, liver or thyroid. This finding may not be serious, but you may need more tests. Your doctor can help you decide what other tests you may need, if any.  What can I expect from the results?  About 1 out of 4 LDCT exams will find something that may need more tests. Most of the time, these findings are lung nodules. Lung nodules are very small collections of tissue in the lung. These nodules are very common, and the vast majority--more than 97 percent--are not cancer (benign). Most are normal lymph nodes or small areas of scarring from past infections.  But, if a small lung nodule is found to be cancer, the cancer can be cured more than 90 percent of the time. To know if the nodule is cancer, we may need to get more images before your next yearly screening exam. If the nodule has suspicious features (for example, it is large, has an odd shape or grows over time), we will  refer you to a specialist for further testing.  Will my doctor also get the results?  Yes. Your doctor will get a copy of your results.  Is it okay to keep smoking now that there s a cancer screening exam?  No. Tobacco is one of the strongest cancer-causing agents. It causes not only lung cancer, but other cancers and cardiovascular (heart) diseases as well. The damage caused by smoking builds over time. This means that the longer you smoke, the higher your risk of disease. While it is never too late to quit, the sooner you quit, the better.  Where can I find help to quit smoking?  The best way to prevent lung cancer is to stop smoking. If you have already quit smoking, congratulations and keep it up! For help on quitting smoking, please call [employer] at 0-566-QUITNOW (1-494.398.5478) or the American Cancer Society at 1-215.698.9775 to find local resources near you.  One-on-one health coaching:  If you d prefer to work individually with a health care provider on tobacco cessation, we offer:      Medication Therapy Management:  Our specially trained pharmacists work closely with you and your doctor to help you quit smoking.  Call [phone] or [phone] (toll free).

## 2023-02-17 NOTE — LETTER
[facility]  [address] [place] [address]  [phone]                    February 27, 2023    Estevan PRABHAKAR Page  [address] [place] [address]      Dear Estevan,    Here is a summary of your recent test results:    Normal red blood cell (hgb) levels, normal white blood cell count and normal platelet levels.   -PSA (prostate specific antigen) test is normal.  This indicates a low likelihood of prostate cancer.  ADVISE: rechecking this in 1 year.   -Cholesterol levels are at your goal levels.  ADVISE: continuing your medication, a regular exercise program with at least 150 minutes of aerobic exercise per week, and eating a low saturated fat/low carbohydrate diet.  Also, you should recheck this fasting cholesterol panel in 12 months.   -Liver and gallbladder tests are normal (ALT,AST, Alk phos, bilirubin), kidney function is normal (Cr, GFR), sodium is normal, potassium is normal, calcium is normal, glucose is normal.   -Microalbumin (urine protein) level is elevated. This is suggestive of early damage to your kidneys from high blood pressure.  ADVISE: avoiding anti-inflamatory agents such as ibuprofen (Advil, Motrin) or naproxen (Aleve) as much as possible, keeping your blood pressure in a normal range, and continuing your medication (lisinopril) that helps protect your kidneys.  Also, this should be rechecked in 1 year.              Thank you very much for trusting [facility].     Healthy regards,            Chance Blair M.D.        Results for orders placed or performed in visit on 02/17/23   Albumin Random Urine Quantitative with Creat Ratio     Status: Abnormal   Result Value Ref Range    Creatinine Urine mg/dL 122.0 mg/dL    Albumin Urine mg/L 36.2 mg/L    Albumin Urine mg/g Cr 29.67 (H) 0.00 - 17.00 mg/g Cr   COMPREHENSIVE METABOLIC PANEL     Status: Abnormal   Result Value Ref Range    Sodium 138 136 - 145 mmol/L    Potassium 4.2 3.4 - 5.3  mmol/L    Chloride 104 98 - 107 mmol/L    Carbon Dioxide (CO2) 22 22 - 29 mmol/L    Anion Gap 12 7 - 15 mmol/L    Urea Nitrogen 14.4 8.0 - 23.0 mg/dL    Creatinine 0.89 0.67 - 1.17 mg/dL    Calcium 10.9 (H) 8.8 - 10.2 mg/dL    Glucose 78 70 - 99 mg/dL    Alkaline Phosphatase 95 40 - 129 U/L    AST 33 10 - 50 U/L    ALT 30 10 - 50 U/L    Protein Total 6.9 6.4 - 8.3 g/dL    Albumin 4.7 3.5 - 5.2 g/dL    Bilirubin Total 0.9 <=1.2 mg/dL    GFR Estimate >90 >60 mL/min/1.73m2   Lipid panel reflex to direct LDL Non-fasting     Status: Abnormal   Result Value Ref Range    Cholesterol 187 <200 mg/dL    Triglycerides 157 (H) <150 mg/dL    Direct Measure HDL 30 (L) >=40 mg/dL    LDL Cholesterol Calculated 126 (H) <=100 mg/dL    Non HDL Cholesterol 157 (H) <130 mg/dL    Narrative    Cholesterol  Desirable:  <200 mg/dL    Triglycerides  Normal:  Less than 150 mg/dL  Borderline High:  150-199 mg/dL  High:  200-499 mg/dL  Very High:  Greater than or equal to 500 mg/dL    Direct Measure HDL  Female:  Greater than or equal to 50 mg/dL   Male:  Greater than or equal to 40 mg/dL    LDL Cholesterol  Desirable:  <100mg/dL  Above Desirable:  100-129 mg/dL   Borderline High:  130-159 mg/dL   High:  160-189 mg/dL   Very High:  >= 190 mg/dL    Non HDL Cholesterol  Desirable:  130 mg/dL  Above Desirable:  130-159 mg/dL  Borderline High:  160-189 mg/dL  High:  190-219 mg/dL  Very High:  Greater than or equal to 220 mg/dL   CBC with Platelets     Status: Abnormal   Result Value Ref Range    WBC Count 8.1 4.0 - 11.0 10e3/uL    RBC Count >8.00 (H) 4.40 - 5.90 10e6/uL    Hemoglobin 16.0 13.3 - 17.7 g/dL    Hematocrit 49.5 40.0 - 53.0 %    MCV 62 (L) 78 - 100 fL    MCH 20.0 (L) 26.5 - 33.0 pg    MCHC 32.3 31.5 - 36.5 g/dL    RDW 18.8 (H) 10.0 - 15.0 %    Platelet Count 209 150 - 450 10e3/uL   PROSTATE SPEC ANTIGEN SCREEN     Status: Normal   Result Value Ref Range    Prostate Specific Antigen Screen 2.40 0.00 - 4.50 ng/mL    Narrative    This  result is obtained using the Roche Elecsys total PSA method on the dayron e801 immunoassay analyzer. Results obtained with different assay methods or kits cannot be used interchangeably.

## 2023-02-22 NOTE — RESULT ENCOUNTER NOTE
Note to Staff: please send a result letter    -Normal red blood cell (hgb) levels, normal white blood cell count and normal platelet levels.  -PSA (prostate specific antigen) test is normal.  This indicates a low likelihood of prostate cancer.  ADVISE: rechecking this in 1 year.  -Cholesterol levels are at your goal levels.  ADVISE: continuing your medication, a regular exercise program with at least 150 minutes of aerobic exercise per week, and eating a low saturated fat/low carbohydrate diet.  Also, you should recheck this fasting cholesterol panel in 12 months.  -Liver and gallbladder tests are normal (ALT,AST, Alk phos, bilirubin), kidney function is normal (Cr, GFR), sodium is normal, potassium is normal, calcium is normal, glucose is normal.  -Microalbumin (urine protein) level is elevated. This is suggestive of early damage to your kidneys from high blood pressure.  ADVISE: avoiding anti-inflamatory agents such as ibuprofen (Advil, Motrin) or naproxen (Aleve) as much as possible, keeping your blood pressure in a normal range, and continuing your medication (lisinopril) that helps protect your kidneys.  Also, this should be rechecked in 1 year.      For additional lab test information, www.testing.com is a very good reference.

## 2023-02-24 ENCOUNTER — ALLIED HEALTH/NURSE VISIT (OUTPATIENT)
Dept: FAMILY MEDICINE | Facility: CLINIC | Age: 63
End: 2023-02-24

## 2023-02-24 ENCOUNTER — ALLIED HEALTH/NURSE VISIT (OUTPATIENT)
Dept: FAMILY MEDICINE | Facility: CLINIC | Age: 63
End: 2023-02-24
Payer: COMMERCIAL

## 2023-02-24 VITALS — DIASTOLIC BLOOD PRESSURE: 90 MMHG | SYSTOLIC BLOOD PRESSURE: 160 MMHG

## 2023-02-24 DIAGNOSIS — I10 HYPERTENSION GOAL BP (BLOOD PRESSURE) < 130/80: Primary | ICD-10-CM

## 2023-02-24 PROCEDURE — 99207 PR NO CHARGE NURSE ONLY: CPT | Performed by: FAMILY MEDICINE

## 2023-02-24 NOTE — CONFIDENTIAL NOTE
Estevan Aaron was evaluated at [facility] on February 24, 2023 at which time his blood pressure was:    BP Readings from Last 1 Encounters:   02/17/23 (!) 170/90     No data recorded      Reviewed lifestyle modifications for blood pressure control and reduction: including making healthy food choices, managing weight, getting regular exercise, smoking cessation, reducing alcohol consumption, monitoring blood pressure regularly.     Symptoms: None    BP Goal:Other: <130/80    BP Assessment:  BP too high    Potential Reasons for BP too high: Tobacco use within past 30 minutes and Caffeine use within past 30 minutes    BP Follow-Up Plan: Recheck BP in 30 days at pharmacy    Recommendation to Provider: Continue amlodipine, recheck in 2-4 weeks. x    Note completed by: Laila Steel RPH       03-Apr-2017 13:01

## 2023-02-24 NOTE — PROGRESS NOTES
Estevan Aaron was evaluated at [facility] on February 24, 2023 at which time his blood pressure was:    BP Readings from Last 1 Encounters:   02/24/23 (!) 160/90     No data recorded      Reviewed lifestyle modifications for blood pressure control and reduction: including making healthy food choices, managing weight, getting regular exercise, smoking cessation, reducing alcohol consumption, monitoring blood pressure regularly.     Symptoms: None    BP Goal:Other: <130/80    BP Assessment:  BP too high    Potential Reasons for BP too high: Tobacco use within past 30 minutes    BP Follow-Up Plan: Recheck BP in 30 days at pharmacy    Recommendation to Provider: Continue amlodipine dose that was just started 5 days ago.  Recheck in 2-4 weeks.  Pt reports that he is still smoking up to 0.75 pack per day.  He does express interest in trying the nicotine gum as he said that it helped him quit several years ago.  He will also try to start watching the sodium content of foods as well.    Note completed by: Laila Steel RPH

## 2023-02-25 NOTE — PROGRESS NOTES
Okay to increase amlodipine to 10 mg daily, (2 of the 5 mg tablets at the same time -side effect may be ankle swelling (edema) and especially if he eats too much salt, is good to Walk around as often as possible or wear compressive socks if needed.), recheck blood pressure in 1 to 2 weeks.

## 2023-02-28 RX ORDER — AMLODIPINE BESYLATE 10 MG/1
10 TABLET ORAL DAILY
Qty: 90 TABLET | Refills: 3 | Status: SHIPPED | OUTPATIENT
Start: 2023-02-28 | End: 2024-02-16

## 2023-02-28 NOTE — PROGRESS NOTES
Called and spoke with patient.     Advised of provider's note below.   He will increase his amlodipine to 10 mg daily. Will take two 5 mg tablets until he needs a refill.     He is scheduled with the pharmacy for another BP check on 3/13 at 0845.     Rx pended for amlodipine 10 mg tablets. Routing to provider to review and sign.     YAN ELIAS RN on 2/28/2023 at 5:04 PM   [facility]       English

## 2023-03-13 ENCOUNTER — TELEPHONE (OUTPATIENT)
Dept: FAMILY MEDICINE | Facility: CLINIC | Age: 63
End: 2023-03-13

## 2023-03-13 ENCOUNTER — ALLIED HEALTH/NURSE VISIT (OUTPATIENT)
Dept: FAMILY MEDICINE | Facility: CLINIC | Age: 63
End: 2023-03-13
Payer: COMMERCIAL

## 2023-03-13 VITALS — SYSTOLIC BLOOD PRESSURE: 162 MMHG | DIASTOLIC BLOOD PRESSURE: 85 MMHG

## 2023-03-13 DIAGNOSIS — F17.209 NICOTINE DEPENDENCE WITH NICOTINE-INDUCED DISORDER, UNSPECIFIED NICOTINE PRODUCT TYPE: Primary | ICD-10-CM

## 2023-03-13 DIAGNOSIS — I10 HYPERTENSION GOAL BP (BLOOD PRESSURE) < 130/80: ICD-10-CM

## 2023-03-13 DIAGNOSIS — F41.8 SITUATIONAL ANXIETY: ICD-10-CM

## 2023-03-13 DIAGNOSIS — I10 HYPERTENSION GOAL BP (BLOOD PRESSURE) < 130/80: Primary | ICD-10-CM

## 2023-03-13 PROCEDURE — 99207 PR NO CHARGE NURSE ONLY: CPT | Performed by: FAMILY MEDICINE

## 2023-03-13 RX ORDER — FUROSEMIDE 20 MG
40 TABLET ORAL DAILY
Qty: 180 TABLET | Refills: 3 | Status: SHIPPED | OUTPATIENT
Start: 2023-03-13 | End: 2024-02-20

## 2023-03-13 NOTE — PROGRESS NOTES
Estevan Aaron was evaluated at [facility] on March 13, 2023 at which time his blood pressure was:    BP Readings from Last 1 Encounters:   03/13/23 (!) 162/85     No data recorded      Reviewed lifestyle modifications for blood pressure control and reduction: including making healthy food choices, managing weight, getting regular exercise, smoking cessation, reducing alcohol consumption, monitoring blood pressure regularly.     Symptoms: None    BP Goal:Other: <130/80    BP Assessment:  BP too high    Potential Reasons for BP too high: Anxiety, BP dose too low/needs additional therapy/smoking    BP Follow-Up Plan: Referral to PCP    Recommendation to Provider: Santos came in for BP follow up today in pharmacy. He smokes 1 ppd and is  interested in trying nicotine gum for smoking cessation (has worked in past).     Additionally, he may benefit from increased dose of fluoxetine from 20mg to 40mg daily to try and reduce his daily anxiety (work related).     He would also benefit from increased dose of furosemide or an additional BP medication from a different class added to his regimen to bring him down to <130/80.     Note completed by: Laila Mane RP

## 2023-03-13 NOTE — TELEPHONE ENCOUNTER
Note from pharmacy that he like to try nicotine gum-sent.  Also there is a comment from the pharmacist that he might benefit from a higher dose of his fluoxetine and could increase dose to 40 mg.  Please call and see if he would like to increase his dose of fluoxetine to 40 mg daily and send that in if so.

## 2023-03-14 NOTE — TELEPHONE ENCOUNTER
Also recent blood pressure is elevated and would recommend increasing furosemide to 40 mg daily (with this he should increase potassium in diet) with recheck of blood pressure in 1 week at pharmacy as well as lab check (BMP-nonfasting okay).  Please schedule lab only appointment.

## 2023-03-14 NOTE — TELEPHONE ENCOUNTER
See notes below from Dr. Blair.     Attempt # 1    Called # [phone]     Left a non detailed VM to call back at [phone] and ask for any available Triage Nurse.    YAN ELIAS RN on 3/14/2023 at 10:55 AM   [facility]

## 2023-03-14 NOTE — TELEPHONE ENCOUNTER
S-(situation): Pt called in and was advised of Furosamide increase and BP/lab rechecks.     B-(background): Pharmacy had also stated he may benefit from an increase in  increase in his Fluoxetine.    A-(assessment): See questionnaires below. Patient states he has Anxiety and he doesn't think the Fluoxetine does anything.     R-(recommendations): Please advise re: Fluoxetine dose.         PHQ 1/25/2021 1/18/2022 2/17/2023   PHQ-9 Total Score 9 2 0   Q9: Thoughts of better off dead/self-harm past 2 weeks Not at all Not at all Not at all          GEORGINA-7 SCORE 1/25/2021 1/18/2022 2/17/2023   Total Score - - 9 (mild anxiety)   Total Score 14 1 9

## 2023-03-16 NOTE — TELEPHONE ENCOUNTER
Patient states he has been taking 20 mg of Prozac for the past month and he is ok with an increase.   States anxiety is not controlled with the 20mg of Prozac. He states there is a lot of things going on his life.    Celi SAUCEDO RN   [facility] Triage

## 2023-03-16 NOTE — TELEPHONE ENCOUNTER
Attempt #1  Called Phone # [phone]    Left a non detailed voicemail to please call back and ask for any available triage nurse @ [phone].     Celi SAUCEDO RN   [facility] Triage

## 2023-03-16 NOTE — TELEPHONE ENCOUNTER
I would still recommend increasing the fluoxetine from anxiety.  Recheck appointment at that time we recommended to

## 2023-03-17 RX ORDER — FLUOXETINE 40 MG/1
40 CAPSULE ORAL DAILY
Qty: 90 CAPSULE | Refills: 3 | Status: SHIPPED | OUTPATIENT
Start: 2023-03-17 | End: 2024-02-20

## 2023-03-17 NOTE — TELEPHONE ENCOUNTER
Fluoxetine 40 mg daily sent to our pharmacy.  He can use two of his 20 mg fluoxetine at the same time to use up his current stock.

## 2023-03-20 NOTE — TELEPHONE ENCOUNTER
Attempt # 1    Called #   Telephone Information:   Mobile [phone]         Left a non detailed VM     Joanna Romero RN, BSN  [place] Triage

## 2023-03-21 NOTE — TELEPHONE ENCOUNTER
Attempt # 2    Called #   Telephone Information:   Mobile [phone]       Left a non detailed VM     Joanna Romero RN, BSN  [place] [facility]riage

## 2023-03-22 ENCOUNTER — ALLIED HEALTH/NURSE VISIT (OUTPATIENT)
Dept: FAMILY MEDICINE | Facility: CLINIC | Age: 63
End: 2023-03-22

## 2023-03-22 ENCOUNTER — LAB (OUTPATIENT)
Dept: LAB | Facility: CLINIC | Age: 63
End: 2023-03-22
Payer: COMMERCIAL

## 2023-03-22 VITALS — HEART RATE: 69 BPM | DIASTOLIC BLOOD PRESSURE: 78 MMHG | SYSTOLIC BLOOD PRESSURE: 145 MMHG

## 2023-03-22 DIAGNOSIS — I10 HYPERTENSION GOAL BP (BLOOD PRESSURE) < 130/80: Primary | ICD-10-CM

## 2023-03-22 DIAGNOSIS — I10 HYPERTENSION GOAL BP (BLOOD PRESSURE) < 130/80: ICD-10-CM

## 2023-03-22 LAB
ANION GAP SERPL CALCULATED.3IONS-SCNC: 14 MMOL/L (ref 7–15)
BUN SERPL-MCNC: 19.3 MG/DL (ref 8–23)
CALCIUM SERPL-MCNC: 11.4 MG/DL (ref 8.8–10.2)
CHLORIDE SERPL-SCNC: 101 MMOL/L (ref 98–107)
CREAT SERPL-MCNC: 1 MG/DL (ref 0.67–1.17)
DEPRECATED HCO3 PLAS-SCNC: 21 MMOL/L (ref 22–29)
GFR SERPL CREATININE-BSD FRML MDRD: 85 ML/MIN/1.73M2
GLUCOSE SERPL-MCNC: 116 MG/DL (ref 70–99)
POTASSIUM SERPL-SCNC: 3.9 MMOL/L (ref 3.4–5.3)
SODIUM SERPL-SCNC: 136 MMOL/L (ref 136–145)

## 2023-03-22 PROCEDURE — 99207 PR NO CHARGE NURSE ONLY: CPT | Performed by: FAMILY MEDICINE

## 2023-03-22 PROCEDURE — 80048 BASIC METABOLIC PNL TOTAL CA: CPT

## 2023-03-22 PROCEDURE — 36415 COLL VENOUS BLD VENIPUNCTURE: CPT

## 2023-03-22 NOTE — PROGRESS NOTES
Estevan Aaron was evaluated at [facility] on March 22, 2023 at which time his blood pressure was:    BP Readings from Last 1 Encounters:   03/22/23 (!) 145/78     No data recorded      Reviewed lifestyle modifications for blood pressure control and reduction: including making healthy food choices, managing weight, getting regular exercise, smoking cessation, reducing alcohol consumption, monitoring blood pressure regularly.     Symptoms: None    BP Goal:Other: <130/80    BP Assessment:  BP too high    Potential Reasons for BP too high: Anxiety, stress, smokes.    BP Follow-Up Plan: Recheck BP in 30 days at pharmacy    Recommendation to Provider: Santos came into pharmacy for BP check today. He had recent dose increase of furosemide from 20mg to 40mg. He also had dose increase of fluoxetine 20mg to 40mg daily, which he was hesitant to start. He started using nicotine gum to try and cut back/quit smoking. Encouraged him to start fluoxetine 40mg daily to help keep anxiety/mood controlled while he cuts back on smoking. His BP was not at goal quite yet. He will bring in home monitor at next pharmacy BP check.    Note completed by: Laila Mane [facility]

## 2023-03-22 NOTE — LETTER
[facility]  [address] [place] [address]  [phone]                    March 24, 2023    Estevan PRABHAKAR Page  [address] [place] [address]      Dear Estevan,    Here is a summary of your recent test results:    -Kidney function (GFR) is normal.   -Sodium is normal.   -Potassium is normal.   -Calcium is elevated.     -Glucose is mildly elevated but you were nonfasting and this is okay..     For additional lab test information, [email or web address] is a very good reference.     Your test results are enclosed.      Please contact me if you have any questions.    In addition, here is a list of due or overdue Health Maintenance reminders.    Health Maintenance Due   Topic Date Due     Pneumococcal Vaccine (1 - PCV) Never done     Colorectal Cancer Screening  Never done     LUNG CANCER SCREENING  Never done     COVID-19 Vaccine (3 - Booster for Pfizer series) 07/22/2021     Zoster (Shingles) Vaccine (2 of 2) 03/15/2022     Flu Vaccine (1) 09/01/2022       Please call us at [phone] (or use [employer]) to address the above recommendations.            Thank you very much for trusting [facility].     Healthy regards,            Chance Blair M.D.        Results for orders placed or performed in visit on 03/22/23   **Basic metabolic panel FUTURE 14d     Status: Abnormal   Result Value Ref Range    Sodium 136 136 - 145 mmol/L    Potassium 3.9 3.4 - 5.3 mmol/L    Chloride 101 98 - 107 mmol/L    Carbon Dioxide (CO2) 21 (L) 22 - 29 mmol/L    Anion Gap 14 7 - 15 mmol/L    Urea Nitrogen 19.3 8.0 - 23.0 mg/dL    Creatinine 1.00 0.67 - 1.17 mg/dL    Calcium 11.4 (H) 8.8 - 10.2 mg/dL    Glucose 116 (H) 70 - 99 mg/dL    GFR Estimate 85 >60 mL/min/1.73m2

## 2023-03-23 NOTE — RESULT ENCOUNTER NOTE
Note to Staff: please send a result letter    -Kidney function (GFR) is normal.  -Sodium is normal.  -Potassium is normal.  -Calcium is elevated.    -Glucose is mildly elevated but you were nonfasting and this is okay..     For additional lab test information, [email or web address] is a very good reference.

## 2023-03-23 NOTE — TELEPHONE ENCOUNTER
Attempt # 3    Called # [phone]     Left a non detailed VM to call back at [phone] and ask for any available Triage Nurse.    Checked with [facility] pharmacy - patient picked up medications yesterday. Closing encounter.     YAN ELIAS RN on 3/23/2023 at 11:20 AM   [facility]

## 2023-04-24 ENCOUNTER — ALLIED HEALTH/NURSE VISIT (OUTPATIENT)
Dept: FAMILY MEDICINE | Facility: CLINIC | Age: 63
End: 2023-04-24
Payer: COMMERCIAL

## 2023-04-24 DIAGNOSIS — Z53.9 ERRONEOUS ENCOUNTER--DISREGARD: Primary | ICD-10-CM

## 2023-05-11 DIAGNOSIS — I10 HYPERTENSION GOAL BP (BLOOD PRESSURE) < 130/80: ICD-10-CM

## 2023-05-12 RX ORDER — LISINOPRIL 40 MG/1
TABLET ORAL
Qty: 90 TABLET | Refills: 0 | Status: SHIPPED | OUTPATIENT
Start: 2023-05-12 | End: 2024-02-20

## 2023-05-19 ENCOUNTER — ALLIED HEALTH/NURSE VISIT (OUTPATIENT)
Dept: FAMILY MEDICINE | Facility: CLINIC | Age: 63
End: 2023-05-19
Payer: COMMERCIAL

## 2023-05-19 VITALS — SYSTOLIC BLOOD PRESSURE: 142 MMHG | DIASTOLIC BLOOD PRESSURE: 84 MMHG

## 2023-05-19 DIAGNOSIS — I10 HYPERTENSION GOAL BP (BLOOD PRESSURE) < 130/80: Primary | ICD-10-CM

## 2023-05-19 PROCEDURE — 99207 PR NO CHARGE NURSE ONLY: CPT | Performed by: FAMILY MEDICINE

## 2023-05-19 NOTE — PROGRESS NOTES
"Estevan Aaron was evaluated at [facility] on May 19, 2023 at which time his blood pressure was:    BP Readings from Last 1 Encounters:   05/19/23 (!) 142/84     No data recorded      Reviewed lifestyle modifications for blood pressure control and reduction: including making healthy food choices, managing weight, getting regular exercise, smoking cessation, reducing alcohol consumption, monitoring blood pressure regularly.     Symptoms: None    BP Goal:Other: <130/80    BP Assessment:  BP too high    Potential Reasons for BP too high: Dose of BP medication too low    BP Follow-Up Plan: Recheck BP in 30 days at pharmacy    Recommendation to Provider: Pt has been consistently mid 140'd/80's.  We talk about smoking cessation each visit and he reports that \"I dont know if ill ever be able to quit.\"  Encouraged him to keep trying to reduce which he says he is doing.  Suggested NOT keeping cigarettes in the visor of his car and storing them less accessibly if possible.   Recommend a bump in his dose(s) to get blood pressure within range.  He is tolerating the curent doses very well with no side effects.    Note completed by: Laila Steel [facility]    "

## 2023-05-19 NOTE — Clinical Note
Routing message to PCP for review because BP checked at pharmacy is above goal. Recommended dose increase on one or more of his blood pressure medications.

## 2023-06-15 ENCOUNTER — ALLIED HEALTH/NURSE VISIT (OUTPATIENT)
Dept: FAMILY MEDICINE | Facility: CLINIC | Age: 63
End: 2023-06-15
Payer: COMMERCIAL

## 2023-06-15 VITALS — SYSTOLIC BLOOD PRESSURE: 157 MMHG | DIASTOLIC BLOOD PRESSURE: 82 MMHG

## 2023-06-15 DIAGNOSIS — I10 HYPERTENSION GOAL BP (BLOOD PRESSURE) < 130/80: Primary | ICD-10-CM

## 2023-06-15 PROCEDURE — 99207 PR NO CHARGE NURSE ONLY: CPT | Performed by: FAMILY MEDICINE

## 2023-06-15 NOTE — PROGRESS NOTES
"Estevan Aaron was evaluated at [facility] on Niyah 15, 2023 at which time his blood pressure was:    BP Readings from Last 1 Encounters:   06/15/23 (!) 157/82     No data recorded      Reviewed lifestyle modifications for blood pressure control and reduction: including making healthy food choices, managing weight, getting regular exercise, smoking cessation, reducing alcohol consumption, monitoring blood pressure regularly.     Symptoms: None    BP Goal:Other: <130/80    BP Assessment:  BP too high    Potential Reasons for BP too high: Dose of BP medication too low    BP Follow-Up Plan: Recheck BP in 30 days at pharmacy    Recommendation to Provider: BP has been consistently above goal since last med change 3/12/23.  He does report that he feels like he 'Has to pee all the time\" and is not interested in increasing the diuretic.  He is starting to consider quitting smoking as he realizes that this is hindering the progress.  He wants to try and rechck bp in 2 weeks in the morning when he hasnt smoked to see how the reading is.    Recommend consideration of adding another class of blood pressure med after the next reading if still not at goal.    Note completed by: Laila Steel RPH  "

## 2023-06-27 ENCOUNTER — ALLIED HEALTH/NURSE VISIT (OUTPATIENT)
Dept: FAMILY MEDICINE | Facility: CLINIC | Age: 63
End: 2023-06-27
Payer: COMMERCIAL

## 2023-06-27 VITALS — DIASTOLIC BLOOD PRESSURE: 85 MMHG | SYSTOLIC BLOOD PRESSURE: 166 MMHG

## 2023-06-27 DIAGNOSIS — I10 HYPERTENSION GOAL BP (BLOOD PRESSURE) < 130/80: Primary | ICD-10-CM

## 2023-06-27 PROCEDURE — 99207 PR NO CHARGE NURSE ONLY: CPT | Performed by: FAMILY MEDICINE

## 2023-06-27 NOTE — PROGRESS NOTES
Estevan Aaron was evaluated at [facility] on June 27, 2023 at which time his blood pressure was:    BP Readings from Last 1 Encounters:   06/27/23 (!) 166/85     No data recorded      Reviewed lifestyle modifications for blood pressure control and reduction: including making healthy food choices, managing weight, getting regular exercise, smoking cessation, reducing alcohol consumption, monitoring blood pressure regularly.     Symptoms: None    BP Goal:Other: <130/80    BP Assessment:  BP too high    Potential Reasons for BP too high: Dose of BP medication too low    BP Follow-Up Plan: Recheck BP in 30 days at pharmacy    Recommendation to Provider: Pt has repeatedly been above goal and has not been successful in reducing his smoking (although he is aware this is needed.)  He reports extra stressors as he and wife have recently decided to separate.    Recommendation to consider adding a class (beta blocker) to his regimen to get him to goal.  Next bp check scheduled audrey 7/18/23.  If this is decided upon, he is requesting a call as he does not use mychart.  Note completed by: Laila Steel RPH

## 2023-07-07 RX ORDER — METOPROLOL SUCCINATE 25 MG/1
25 TABLET, EXTENDED RELEASE ORAL DAILY
Qty: 90 TABLET | Refills: 3 | Status: SHIPPED | OUTPATIENT
Start: 2023-07-07 | End: 2023-07-20

## 2023-07-07 NOTE — PROGRESS NOTES
Will add metoprolol XL 25 mg daily.  Recheck blood pressure in about 2 weeks at the pharmacy or clinic, please notify him of the new blood pressure medication and schedule this.

## 2023-07-07 NOTE — PROGRESS NOTES
Attempt # 1    Called # [phone]     Left a non detailed VM to call back at [phone] and ask for any available Triage Nurse.    Tello Chávez RN  [facility]

## 2023-07-18 ENCOUNTER — ALLIED HEALTH/NURSE VISIT (OUTPATIENT)
Dept: FAMILY MEDICINE | Facility: CLINIC | Age: 63
End: 2023-07-18
Payer: COMMERCIAL

## 2023-07-18 VITALS — DIASTOLIC BLOOD PRESSURE: 81 MMHG | SYSTOLIC BLOOD PRESSURE: 150 MMHG

## 2023-07-18 DIAGNOSIS — I10 HYPERTENSION GOAL BP (BLOOD PRESSURE) < 130/80: Primary | ICD-10-CM

## 2023-07-18 PROCEDURE — 99207 PR NO CHARGE NURSE ONLY: CPT | Performed by: FAMILY MEDICINE

## 2023-07-18 NOTE — PROGRESS NOTES
Estevan Aaron was evaluated at [facility] on July 18, 2023 at which time his blood pressure was:    BP Readings from Last 1 Encounters:   06/27/23 (!) 166/85     No data recorded      Reviewed lifestyle modifications for blood pressure control and reduction: including making healthy food choices, managing weight, getting regular exercise, smoking cessation, reducing alcohol consumption, monitoring blood pressure regularly.     Symptoms: None    BP Goal: <130/80     BP Assessment:  BP too high    Potential Reasons for BP too high: NA - Not applicable    BP Follow-Up Plan: Recheck BP in 30 days at pharmacy    Recommendation to Provider: Recheck in 2 weeks- scheduled.  Pt tolerating metoprolol well.  He did not smoke more than 1/2 cigarette and had only a 'few sips of coffee' this morning.  Took meds at least 2 hours ago.    Note completed by: Laila Steel RP

## 2023-07-20 RX ORDER — METOPROLOL SUCCINATE 50 MG/1
50 TABLET, EXTENDED RELEASE ORAL DAILY
Qty: 90 TABLET | Refills: 3 | Status: SHIPPED | OUTPATIENT
Start: 2023-07-20 | End: 2024-02-20

## 2023-07-20 NOTE — PROGRESS NOTES
Blood pressure is still above goal of less than 130/80 and would recommend increasing metoprolol to 50 mg daily.  It is okay if he takes two of his 25 mg tabs lead at the same time to use up his supply and I sent in a new prescription to his pharmacy for 50 mg pills that he can fill that when he needs them by contacting the pharmacy.

## 2023-07-21 NOTE — PROGRESS NOTES
BP Readings from Last 3 Encounters:   07/18/23 (!) 150/81   06/27/23 (!) 166/85   06/15/23 (!) 157/82        Called and spoke with patient.     Advised to increase metoprolol from 25 mg to 50 mg daily. Can use up current supply by taking two 25 mg tablets, then can take one 50 mg tablet by picking up new prescription from pharmacy.     He already has another BP check at the pharmacy scheduled in 2 weeks.     YAN ELIAS RN on 7/21/2023 at 9:36 AM   [facility]

## 2023-07-31 ENCOUNTER — ALLIED HEALTH/NURSE VISIT (OUTPATIENT)
Dept: FAMILY MEDICINE | Facility: CLINIC | Age: 63
End: 2023-07-31
Payer: COMMERCIAL

## 2023-07-31 VITALS — DIASTOLIC BLOOD PRESSURE: 76 MMHG | SYSTOLIC BLOOD PRESSURE: 136 MMHG | HEART RATE: 53 BPM

## 2023-07-31 DIAGNOSIS — I10 HYPERTENSION GOAL BP (BLOOD PRESSURE) < 130/80: Primary | ICD-10-CM

## 2023-07-31 PROCEDURE — 99207 PR NO CHARGE NURSE ONLY: CPT | Performed by: FAMILY MEDICINE

## 2023-07-31 NOTE — PROGRESS NOTES
Estevan Aaron was evaluated at [facility] on July 31, 2023 at which time his blood pressure was:    BP Readings from Last 1 Encounters:   07/31/23 136/76     No data recorded      Reviewed lifestyle modifications for blood pressure control and reduction: including making healthy food choices, managing weight, getting regular exercise, smoking cessation, reducing alcohol consumption, monitoring blood pressure regularly.     Symptoms: None    BP Goal:Other: <130/80    BP Assessment:  BP too high    Potential Reasons for BP too high: Unknown/Other: Santos smokes cigarettes. Had smoked this morning.    BP Follow-Up Plan: Recheck BP in 30 days at pharmacy    Recommendation to Provider: no change at this time. Will recheck Santos in 2 weeks at pharmacy. If continues to be above goal of 130/80 then consider increasing furosemide or adding in another medication from a different class. Continue to encourage smoking cessation.     Laila Mane, PharmD  [facility]  PH: [phone]      Note completed by: Laila Mane [facility]

## 2023-08-14 ENCOUNTER — ALLIED HEALTH/NURSE VISIT (OUTPATIENT)
Dept: FAMILY MEDICINE | Facility: CLINIC | Age: 63
End: 2023-08-14
Payer: COMMERCIAL

## 2023-08-14 VITALS — SYSTOLIC BLOOD PRESSURE: 149 MMHG | DIASTOLIC BLOOD PRESSURE: 84 MMHG

## 2023-08-14 DIAGNOSIS — I10 HYPERTENSION GOAL BP (BLOOD PRESSURE) < 130/80: Primary | ICD-10-CM

## 2023-08-14 PROCEDURE — 99207 PR NO CHARGE NURSE ONLY: CPT | Performed by: FAMILY MEDICINE

## 2023-08-14 NOTE — PROGRESS NOTES
Estevan Aaron was evaluated at [facility] on August 14, 2023 at which time his blood pressure was:    BP Readings from Last 1 Encounters:   08/14/23 (!) 146/79     No data recorded      Reviewed lifestyle modifications for blood pressure control and reduction: including making healthy food choices, managing weight, getting regular exercise, smoking cessation, reducing alcohol consumption, monitoring blood pressure regularly.     Symptoms: None    BP Goal:Other: <130/80    BP Assessment:  BP too high    Potential Reasons for BP too high: Dose of BP medication too low    BP Follow-Up Plan: Recheck BP in 30 days at pharmacy    Recommendation to Provider: Blood pressure coming down since previous reading (started Metoprolol 50 mg once daily).  Consider dose increase to bump it down to goal.    Note completed by: Laila Steel RPH

## 2023-08-15 NOTE — PROGRESS NOTES
BP still above goal but pulse is quite low and would likely not tolerate elevation of his metoprolol.  Low salt and fat diet recommended.  Exercise as able and weight loss if possible, recheck blood pressure in 2 to 4 weeks.

## 2023-08-28 ENCOUNTER — ALLIED HEALTH/NURSE VISIT (OUTPATIENT)
Dept: FAMILY MEDICINE | Facility: CLINIC | Age: 63
End: 2023-08-28
Payer: COMMERCIAL

## 2023-08-28 VITALS — HEART RATE: 57 BPM | DIASTOLIC BLOOD PRESSURE: 80 MMHG | SYSTOLIC BLOOD PRESSURE: 152 MMHG

## 2023-08-28 DIAGNOSIS — I10 HYPERTENSION GOAL BP (BLOOD PRESSURE) < 130/80: Primary | ICD-10-CM

## 2023-08-28 PROCEDURE — 99207 PR NO CHARGE NURSE ONLY: CPT | Performed by: FAMILY MEDICINE

## 2023-08-28 NOTE — PROGRESS NOTES
Estevan Aaron was evaluated at [facility] on August 28, 2023 at which time his blood pressure was:    BP Readings from Last 1 Encounters:   08/28/23 (!) 152/80     No data recorded      Reviewed lifestyle modifications for blood pressure control and reduction: including making healthy food choices, managing weight, getting regular exercise, smoking cessation, reducing alcohol consumption, monitoring blood pressure regularly.     Symptoms: None    BP Goal:Other: >130/80    BP Assessment:  BP too high    Potential Reasons for BP too high: Unknown/Other: smoking, stress    BP Follow-Up Plan: Recheck BP in 30 days at pharmacy    Recommendation to Provider: Santos is taking 4 BP medications and could benefit from additional medication added to help bring BP to goal. Spoke with Santos about smoking cessation / lifestyle changes to help bring his BP to goal.     Laila Mane, PharmD  [facility]  PH: [phone]      Note completed by: Laila Mane [facility]

## 2023-08-30 RX ORDER — CLONIDINE HYDROCHLORIDE 0.1 MG/1
0.1 TABLET ORAL 2 TIMES DAILY
Qty: 180 TABLET | Refills: 3 | Status: SHIPPED | OUTPATIENT
Start: 2023-08-30 | End: 2024-02-20

## 2023-08-30 NOTE — PROGRESS NOTES
We will add low-dose clonidine 0.1 mg twice daily    Please make appointment within about the next month for follow-up visit with myself.Okay to use same-day, next day, virtual release or virtual slots (do not use provider approval slot).      Last visit in this dept:    2/17/2023     Last visit -this provider:  8/14/2023     Next visit in this dept:   Future Appointments 8/30/2023 - 2/26/2024      None            Health Maintenance   Topic Date Due    Pneumococcal Vaccine: Pediatrics (0 to 5 Years) and At-Risk Patients (6 to 64 Years) (1 - PCV) Never done    COLORECTAL CANCER SCREENING  Never done    LUNG CANCER SCREENING  Never done    COVID-19 Vaccine (3 - Pfizer series) 07/22/2021    ZOSTER IMMUNIZATION (2 of 2) 03/15/2022    INFLUENZA VACCINE (1) 09/01/2023    NICOTINE/TOBACCO CESSATION COUNSELING Q 1 YR  02/17/2024    YEARLY PREVENTIVE VISIT  02/17/2024    CMP  02/17/2024    LIPID  02/17/2024    MICROALBUMIN  02/17/2024    PSA  02/17/2024    ANNUAL REVIEW OF HM ORDERS  02/17/2024    CBC  02/17/2024    DTAP/TDAP/TD IMMUNIZATION (2 - Td or Tdap) 02/26/2026    ADVANCE CARE PLANNING  02/21/2028    HEPATITIS C SCREENING  Completed    PHQ-2 (once per calendar year)  Completed    HIV SCREENING  Addressed    IPV IMMUNIZATION  Aged Out    HPV IMMUNIZATION  Aged Out    MENINGITIS IMMUNIZATION  Aged Out        CSA -- Patient Level:    CSA: None found at the patient level.

## 2023-08-31 NOTE — PROGRESS NOTES
Attempt # 1    Called # [phone]     Left a non detailed VM to call back at [phone] and ask for any available Triage Nurse.    Tello Chávez RN  [facility]

## 2023-09-11 NOTE — PROGRESS NOTES
Called patient and advised of medication and providers note     The patient declined to schedule a follow up, he will look at his schedule and call back.     Patient was advised to ask for a triage nurse and refer to 8/28 BP so patient is scheduled in appropriate time frame.

## 2023-09-11 NOTE — PROGRESS NOTES
Attempt # 2    Called #   Telephone Information:   Mobile [phone]         Left a non detailed VM     Joanna Romero RN, BSN  [facility] Triage

## 2023-09-15 ENCOUNTER — TELEPHONE (OUTPATIENT)
Dept: FAMILY MEDICINE | Facility: CLINIC | Age: 63
End: 2023-09-15
Payer: COMMERCIAL

## 2023-09-15 NOTE — TELEPHONE ENCOUNTER
Would we be able to use any virtual spots for this patient to be seen? Please advise.    Thank you,    Amalia Winters CMA

## 2023-09-15 NOTE — TELEPHONE ENCOUNTER
Reason for Call:  Appointment Request    Patient requesting this type of appt: Chronic Diease Management/Medication/Follow-Up    Requested provider: Estevan Blair    Reason patient unable to be scheduled: Not within requested timeframe    When does patient want to be seen/preferred time: 3-7 days    Comments: Pt needs to be seen within a week or so for a follow up appointment for his blood pressure/new blood pressure medication.    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    Call taken on 9/15/2023 at 11:41 AM by Jenna Diaz

## 2023-09-19 NOTE — TELEPHONE ENCOUNTER
Placed call to patient and help schedule sooner BP check/new med appointment with Dr. Blair for 9/22/23. Closing encounter.

## 2023-09-22 ENCOUNTER — OFFICE VISIT (OUTPATIENT)
Dept: FAMILY MEDICINE | Facility: CLINIC | Age: 63
End: 2023-09-22
Payer: COMMERCIAL

## 2023-09-22 VITALS
SYSTOLIC BLOOD PRESSURE: 120 MMHG | OXYGEN SATURATION: 96 % | WEIGHT: 219 LBS | TEMPERATURE: 97.5 F | HEIGHT: 66 IN | BODY MASS INDEX: 35.2 KG/M2 | HEART RATE: 57 BPM | RESPIRATION RATE: 16 BRPM | DIASTOLIC BLOOD PRESSURE: 74 MMHG

## 2023-09-22 DIAGNOSIS — E66.01 SEVERE OBESITY (BMI 35.0-39.9) WITH COMORBIDITY (H): ICD-10-CM

## 2023-09-22 DIAGNOSIS — E21.3 HYPERPARATHYROIDISM (H): ICD-10-CM

## 2023-09-22 DIAGNOSIS — E83.52 SERUM CALCIUM ELEVATED: ICD-10-CM

## 2023-09-22 DIAGNOSIS — Z12.11 SCREEN FOR COLON CANCER: ICD-10-CM

## 2023-09-22 DIAGNOSIS — L82.1 SEBORRHEIC KERATOSIS: ICD-10-CM

## 2023-09-22 DIAGNOSIS — I10 HYPERTENSION GOAL BP (BLOOD PRESSURE) < 130/80: Primary | ICD-10-CM

## 2023-09-22 LAB
ANION GAP SERPL CALCULATED.3IONS-SCNC: 8 MMOL/L (ref 7–15)
BUN SERPL-MCNC: 14.6 MG/DL (ref 8–23)
CA-I BLD-MCNC: 5.7 MG/DL (ref 4.4–5.2)
CALCIUM SERPL-MCNC: 11 MG/DL (ref 8.8–10.2)
CHLORIDE SERPL-SCNC: 103 MMOL/L (ref 98–107)
CREAT SERPL-MCNC: 0.9 MG/DL (ref 0.67–1.17)
DEPRECATED HCO3 PLAS-SCNC: 26 MMOL/L (ref 22–29)
EGFRCR SERPLBLD CKD-EPI 2021: >90 ML/MIN/1.73M2
GLUCOSE SERPL-MCNC: 88 MG/DL (ref 70–99)
POTASSIUM SERPL-SCNC: 4.6 MMOL/L (ref 3.4–5.3)
PTH-INTACT SERPL-MCNC: 81 PG/ML (ref 15–65)
SODIUM SERPL-SCNC: 137 MMOL/L (ref 136–145)

## 2023-09-22 PROCEDURE — 83970 ASSAY OF PARATHORMONE: CPT | Performed by: FAMILY MEDICINE

## 2023-09-22 PROCEDURE — 99214 OFFICE O/P EST MOD 30 MIN: CPT | Mod: 25 | Performed by: FAMILY MEDICINE

## 2023-09-22 PROCEDURE — 82330 ASSAY OF CALCIUM: CPT | Performed by: FAMILY MEDICINE

## 2023-09-22 PROCEDURE — 17110 DESTRUCTION B9 LES UP TO 14: CPT | Performed by: FAMILY MEDICINE

## 2023-09-22 PROCEDURE — 36415 COLL VENOUS BLD VENIPUNCTURE: CPT | Performed by: FAMILY MEDICINE

## 2023-09-22 PROCEDURE — 80048 BASIC METABOLIC PNL TOTAL CA: CPT | Performed by: FAMILY MEDICINE

## 2023-09-22 NOTE — LETTER
September 25, 2023      Santos PRABHAKAR Agnieszka  [address] [place] [address]        Dear ,    We are writing to inform you of your test results.    -Kidney function (GFR) is normal.   -Sodium is normal.   -Potassium is normal.   -Calcium parathyroid hormone labs are elevated.  This suggest that you have hyperparathyroidism and you should meet with an endocrinologist as already recommended and I gave your referral at your last visit.     For additional lab test information, [email or web address].com is a very good reference.     Resulted Orders   Parathyroid Hormone Intact   Result Value Ref Range    Parathyroid Hormone Intact 81 (H) 15 - 65 pg/mL    Narrative    This result was obtained with the Roche Elecsys PTH STAT assay.   This reference range differs from PTH assays used in other [facility] laboratories.   Ionized Calcium   Result Value Ref Range    Calcium Ionized Whole Blood 5.7 (H) 4.4 - 5.2 mg/dL   Basic metabolic panel  (Ca, Cl, CO2, Creat, Gluc, K, Na, BUN)   Result Value Ref Range    Sodium 137 136 - 145 mmol/L    Potassium 4.6 3.4 - 5.3 mmol/L    Chloride 103 98 - 107 mmol/L    Carbon Dioxide (CO2) 26 22 - 29 mmol/L    Anion Gap 8 7 - 15 mmol/L    Urea Nitrogen 14.6 8.0 - 23.0 mg/dL    Creatinine 0.90 0.67 - 1.17 mg/dL    Calcium 11.0 (H) 8.8 - 10.2 mg/dL    Glucose 88 70 - 99 mg/dL    GFR Estimate >90 >60 mL/min/1.73m2       If you have any questions or concerns, please call the clinic at the number listed above.       Sincerely,              Chance Blair M.D.

## 2023-09-22 NOTE — PROGRESS NOTES
"  Assessment & Plan   Hypertension goal BP (blood pressure) < 130/80  Controlled - continue medication.   - Basic metabolic panel  (Ca, Cl, CO2, Creat, Gluc, K, Na, BUN)  - Basic metabolic panel  (Ca, Cl, CO2, Creat, Gluc, K, Na, BUN)    Seborrheic keratosis  treated  - DESTRUCT BENIGN LESION, UP TO 14    Severe obesity (BMI 35.0-39.9) with comorbidity (H)  Healthy diet and exercise    Screen for colon cancer  - Colonoscopy Screening  Referral    Hyperparathyroidism (H)  Will refer to endocrine after recheck ing labs.   - Parathyroid Hormone Intact  - Ionized Calcium  - Adult Endocrinology  Referral  - Parathyroid Hormone Intact  - Ionized Calcium    Serum calcium elevated  See above.  - Parathyroid Hormone Intact  - Ionized Calcium  - Adult Endocrinology  Referral  - Parathyroid Hormone Intact  - Ionized Calcium        BMI:   Estimated body mass index is 35.62 kg/m  as calculated from the following:    Height as of this encounter: 1.67 m (5' 5.75\").    Weight as of this encounter: 99.3 kg (219 lb).   Weight management plan: Discussed healthy diet and exercise guidelines          No follow-ups on file.          Chance Blair MD      [address] [address]  [email or web address]   Office: [phone]       Meghna Graham is a 63 year old, presenting for the following health issues:  Recheck Medication and Hypertension        9/22/2023    11:43 AM   Additional Questions   Roomed by Sharmin BRAMBILA CMA       History of Present Illness       Hypertension: He presents for follow up of hypertension.  He does not check blood pressure  regularly outside of the clinic. Outpatient blood pressures have not been over 140/90. He follows a low salt diet.     He eats 0-1 servings of fruits and vegetables daily.He consumes 11 or more sweetened beverage(s) daily.He exercises with enough effort to increase his heart rate 9 or less minutes per day.  He exercises " with enough effort to increase his heart rate 5 days per week.   He is taking medications regularly.     He denies getting lightheaded or feeling like he is going to faint lately.    He has a spot on his left lower eyelid that he would like to have removed. He had it frozen in the past, but it never fell off. It did not get sore afterwards.     Hyperlipidemia Follow-Up    Are you regularly taking any medication or supplement to lower your cholesterol?   Yes- atorvastatin  Are you having muscle aches or other side effects that you think could be caused by your cholesterol lowering medication?  No    Anxiety Follow-Up  How are you doing with your anxiety since your last visit? Improved   Are you having other symptoms that might be associated with anxiety? No  Have you had a significant life event? No   Are you feeling depressed? No  Do you have any concerns with your use of alcohol or other drugs? No    Social History     Tobacco Use    Smoking status: Every Day     Packs/day: 1.00     Years: 35.00     Pack years: 35.00     Types: Cigarettes    Smokeless tobacco: Never   Vaping Use    Vaping Use: Never used   Substance Use Topics    Alcohol use: Yes     Alcohol/week: 0.0 standard drinks of alcohol    Drug use: No     Comment: no herbal meds either          1/25/2021     4:46 PM 1/18/2022     9:08 AM 2/17/2023    10:04 AM   GEORGINA-7 SCORE   Total Score   9 (mild anxiety)   Total Score 14 1 9         1/25/2021     4:46 PM 1/18/2022     9:08 AM 2/17/2023    10:02 AM   PHQ   PHQ-9 Total Score 9 2 0   Q9: Thoughts of better off dead/self-harm past 2 weeks Not at all Not at all Not at all         2/17/2023    10:02 AM   Last PHQ-9   1.  Little interest or pleasure in doing things 0   2.  Feeling down, depressed, or hopeless 0   3.  Trouble falling or staying asleep, or sleeping too much 0   4.  Feeling tired or having little energy 0   5.  Poor appetite or overeating 0   6.  Feeling bad about yourself 0   7.  Trouble  "concentrating 0   8.  Moving slowly or restless 0   Q9: Thoughts of better off dead/self-harm past 2 weeks 0   PHQ-9 Total Score 0         2/17/2023    10:04 AM   GEORGINA-7    1. Feeling nervous, anxious, or on edge 3   2. Not being able to stop or control worrying 3   3. Worrying too much about different things 3   4. Trouble relaxing 0   5. Being so restless that it is hard to sit still 0   6. Becoming easily annoyed or irritable 0   7. Feeling afraid, as if something awful might happen 0   GEORGINA-7 Total Score 9   If you checked any problems, how difficult have they made it for you to do your work, take care of things at home, or get along with other people? Somewhat difficult           Review of Systems         Objective    /74   Pulse 57   Temp 97.5  F (36.4  C) (Tympanic)   Resp 16   Ht 1.67 m (5' 5.75\")   Wt 99.3 kg (219 lb)   SpO2 96%   BMI 35.62 kg/m    Body mass index is 35.62 kg/m .  Physical Exam   GENERAL: healthy, alert and no distress  NECK: no adenopathy, no asymmetry, masses, or scars and thyroid normal to palpation  RESP: lungs clear to auscultation - no rales, rhonchi or wheezes  CV: regular rate and rhythm, normal S1 S2, no S3 or S4, no murmur, click or rub, no peripheral edema and peripheral pulses strong  ABDOMEN: soft, nontender, no hepatosplenomegaly, no masses and bowel sounds normal  MS: no gross musculoskeletal defects noted, no edema  SKIN: Cyst 3 x 5 mm raised papule inferior and lateral to his left lower lead-cryotherapy x3 cycles done otherwise no suspicious lesions or rashes                "

## 2023-09-25 NOTE — RESULT ENCOUNTER NOTE
Note to Staff: please send a result letter    -Kidney function (GFR) is normal.  -Sodium is normal.  -Potassium is normal.  -Calcium parathyroid hormone labs are elevated.  This suggest that you have hyperparathyroidism and you should meet with an endocrinologist as already recommended and I gave your referral at your last visit.     For additional lab test information, [email or web address] is a very good reference.

## 2024-01-19 DIAGNOSIS — E78.5 HYPERLIPIDEMIA LDL GOAL <100: ICD-10-CM

## 2024-01-19 RX ORDER — ATORVASTATIN CALCIUM 40 MG/1
40 TABLET, FILM COATED ORAL DAILY
Qty: 90 TABLET | Refills: 3 | OUTPATIENT
Start: 2024-01-19

## 2024-01-26 DIAGNOSIS — E78.5 HYPERLIPIDEMIA LDL GOAL <100: ICD-10-CM

## 2024-01-26 RX ORDER — ATORVASTATIN CALCIUM 40 MG/1
40 TABLET, FILM COATED ORAL DAILY
Qty: 90 TABLET | Refills: 3 | OUTPATIENT
Start: 2024-01-26

## 2024-02-16 DIAGNOSIS — I10 HYPERTENSION GOAL BP (BLOOD PRESSURE) < 130/80: ICD-10-CM

## 2024-02-16 DIAGNOSIS — F41.8 SITUATIONAL ANXIETY: ICD-10-CM

## 2024-02-16 RX ORDER — AMLODIPINE BESYLATE 10 MG/1
10 TABLET ORAL DAILY
Qty: 90 TABLET | Refills: 0 | Status: SHIPPED | OUTPATIENT
Start: 2024-02-16 | End: 2024-02-20

## 2024-02-16 RX ORDER — FLUOXETINE 40 MG/1
40 CAPSULE ORAL DAILY
Qty: 90 CAPSULE | Refills: 3 | OUTPATIENT
Start: 2024-02-16

## 2024-02-20 ENCOUNTER — ORDERS ONLY (AUTO-RELEASED) (OUTPATIENT)
Dept: FAMILY MEDICINE | Facility: CLINIC | Age: 64
End: 2024-02-20

## 2024-02-20 ENCOUNTER — OFFICE VISIT (OUTPATIENT)
Dept: FAMILY MEDICINE | Facility: CLINIC | Age: 64
End: 2024-02-20
Payer: COMMERCIAL

## 2024-02-20 VITALS
SYSTOLIC BLOOD PRESSURE: 126 MMHG | OXYGEN SATURATION: 97 % | TEMPERATURE: 97.2 F | HEIGHT: 66 IN | BODY MASS INDEX: 36.96 KG/M2 | DIASTOLIC BLOOD PRESSURE: 68 MMHG | HEART RATE: 61 BPM | WEIGHT: 230 LBS | RESPIRATION RATE: 13 BRPM

## 2024-02-20 DIAGNOSIS — E66.01 SEVERE OBESITY (BMI 35.0-39.9) WITH COMORBIDITY (H): ICD-10-CM

## 2024-02-20 DIAGNOSIS — E83.52 HYPERCALCEMIA: ICD-10-CM

## 2024-02-20 DIAGNOSIS — I10 HYPERTENSION GOAL BP (BLOOD PRESSURE) < 130/80: ICD-10-CM

## 2024-02-20 DIAGNOSIS — D56.9 THALASSEMIA, UNSPECIFIED TYPE: ICD-10-CM

## 2024-02-20 DIAGNOSIS — Z12.11 SCREEN FOR COLON CANCER: ICD-10-CM

## 2024-02-20 DIAGNOSIS — Z00.00 ROUTINE GENERAL MEDICAL EXAMINATION AT A HEALTH CARE FACILITY: Primary | ICD-10-CM

## 2024-02-20 DIAGNOSIS — F41.8 SITUATIONAL ANXIETY: ICD-10-CM

## 2024-02-20 DIAGNOSIS — Z12.5 SCREENING FOR PROSTATE CANCER: ICD-10-CM

## 2024-02-20 DIAGNOSIS — E21.3 HYPERPARATHYROIDISM (H): ICD-10-CM

## 2024-02-20 DIAGNOSIS — Z87.891 PERSONAL HISTORY OF TOBACCO USE: ICD-10-CM

## 2024-02-20 DIAGNOSIS — E78.5 HYPERLIPIDEMIA LDL GOAL <100: ICD-10-CM

## 2024-02-20 LAB
ALBUMIN SERPL BCG-MCNC: 4.8 G/DL (ref 3.5–5.2)
ALP SERPL-CCNC: 110 U/L (ref 40–150)
ALT SERPL W P-5'-P-CCNC: 45 U/L (ref 0–70)
ANION GAP SERPL CALCULATED.3IONS-SCNC: 7 MMOL/L (ref 7–15)
AST SERPL W P-5'-P-CCNC: 34 U/L (ref 0–45)
BILIRUB SERPL-MCNC: 0.9 MG/DL
BUN SERPL-MCNC: 14.8 MG/DL (ref 8–23)
CALCIUM SERPL-MCNC: 11.4 MG/DL (ref 8.8–10.2)
CHLORIDE SERPL-SCNC: 102 MMOL/L (ref 98–107)
CHOLEST SERPL-MCNC: 192 MG/DL
CREAT SERPL-MCNC: 1 MG/DL (ref 0.67–1.17)
CREAT UR-MCNC: 79.3 MG/DL
DEPRECATED HCO3 PLAS-SCNC: 29 MMOL/L (ref 22–29)
EGFRCR SERPLBLD CKD-EPI 2021: 84 ML/MIN/1.73M2
ERYTHROCYTE [DISTWIDTH] IN BLOOD BY AUTOMATED COUNT: 19.3 % (ref 10–15)
FASTING STATUS PATIENT QL REPORTED: YES
GLUCOSE SERPL-MCNC: 97 MG/DL (ref 70–99)
HCT VFR BLD AUTO: 48.1 % (ref 40–53)
HDLC SERPL-MCNC: 34 MG/DL
HGB BLD-MCNC: 15.6 G/DL (ref 13.3–17.7)
LDLC SERPL CALC-MCNC: 109 MG/DL
MCH RBC QN AUTO: 20.4 PG (ref 26.5–33)
MCHC RBC AUTO-ENTMCNC: 32.4 G/DL (ref 31.5–36.5)
MCV RBC AUTO: 63 FL (ref 78–100)
MICROALBUMIN UR-MCNC: <12 MG/L
MICROALBUMIN/CREAT UR: NORMAL MG/G{CREAT}
NONHDLC SERPL-MCNC: 158 MG/DL
PLATELET # BLD AUTO: 214 10E3/UL (ref 150–450)
POTASSIUM SERPL-SCNC: 4.8 MMOL/L (ref 3.4–5.3)
PROT SERPL-MCNC: 7.2 G/DL (ref 6.4–8.3)
PSA SERPL DL<=0.01 NG/ML-MCNC: 2.32 NG/ML (ref 0–4.5)
PTH-INTACT SERPL-MCNC: 100 PG/ML (ref 15–65)
RBC # BLD AUTO: 7.66 10E6/UL (ref 4.4–5.9)
SODIUM SERPL-SCNC: 138 MMOL/L (ref 135–145)
TRIGL SERPL-MCNC: 244 MG/DL
WBC # BLD AUTO: 9.8 10E3/UL (ref 4–11)

## 2024-02-20 PROCEDURE — 85027 COMPLETE CBC AUTOMATED: CPT | Performed by: FAMILY MEDICINE

## 2024-02-20 PROCEDURE — 83970 ASSAY OF PARATHORMONE: CPT | Performed by: FAMILY MEDICINE

## 2024-02-20 PROCEDURE — 90471 IMMUNIZATION ADMIN: CPT | Performed by: FAMILY MEDICINE

## 2024-02-20 PROCEDURE — G0103 PSA SCREENING: HCPCS | Performed by: FAMILY MEDICINE

## 2024-02-20 PROCEDURE — G0296 VISIT TO DETERM LDCT ELIG: HCPCS | Performed by: FAMILY MEDICINE

## 2024-02-20 PROCEDURE — 80053 COMPREHEN METABOLIC PANEL: CPT | Performed by: FAMILY MEDICINE

## 2024-02-20 PROCEDURE — 99214 OFFICE O/P EST MOD 30 MIN: CPT | Mod: 25 | Performed by: FAMILY MEDICINE

## 2024-02-20 PROCEDURE — 36415 COLL VENOUS BLD VENIPUNCTURE: CPT | Performed by: FAMILY MEDICINE

## 2024-02-20 PROCEDURE — 82570 ASSAY OF URINE CREATININE: CPT | Performed by: FAMILY MEDICINE

## 2024-02-20 PROCEDURE — 82043 UR ALBUMIN QUANTITATIVE: CPT | Performed by: FAMILY MEDICINE

## 2024-02-20 PROCEDURE — 80061 LIPID PANEL: CPT | Performed by: FAMILY MEDICINE

## 2024-02-20 PROCEDURE — 90750 HZV VACC RECOMBINANT IM: CPT | Performed by: FAMILY MEDICINE

## 2024-02-20 PROCEDURE — 99396 PREV VISIT EST AGE 40-64: CPT | Mod: 25 | Performed by: FAMILY MEDICINE

## 2024-02-20 RX ORDER — ATORVASTATIN CALCIUM 40 MG/1
40 TABLET, FILM COATED ORAL DAILY
Qty: 90 TABLET | Refills: 4 | Status: SHIPPED | OUTPATIENT
Start: 2024-02-20

## 2024-02-20 RX ORDER — AMLODIPINE BESYLATE 10 MG/1
10 TABLET ORAL DAILY
Qty: 90 TABLET | Refills: 4 | Status: SHIPPED | OUTPATIENT
Start: 2024-02-20

## 2024-02-20 RX ORDER — FLUOXETINE 40 MG/1
40 CAPSULE ORAL DAILY
Qty: 90 CAPSULE | Refills: 4 | Status: SHIPPED | OUTPATIENT
Start: 2024-02-20

## 2024-02-20 RX ORDER — LISINOPRIL 40 MG/1
40 TABLET ORAL DAILY
Qty: 90 TABLET | Refills: 4 | Status: SHIPPED | OUTPATIENT
Start: 2024-02-20

## 2024-02-20 RX ORDER — FUROSEMIDE 20 MG
40 TABLET ORAL DAILY
Qty: 180 TABLET | Refills: 4 | Status: SHIPPED | OUTPATIENT
Start: 2024-02-20

## 2024-02-20 RX ORDER — METOPROLOL SUCCINATE 50 MG/1
50 TABLET, EXTENDED RELEASE ORAL DAILY
Qty: 90 TABLET | Refills: 4 | Status: SHIPPED | OUTPATIENT
Start: 2024-02-20

## 2024-02-20 RX ORDER — CLONIDINE HYDROCHLORIDE 0.1 MG/1
0.1 TABLET ORAL 2 TIMES DAILY
Qty: 180 TABLET | Refills: 4 | Status: SHIPPED | OUTPATIENT
Start: 2024-02-20

## 2024-02-20 SDOH — HEALTH STABILITY: PHYSICAL HEALTH: ON AVERAGE, HOW MANY DAYS PER WEEK DO YOU ENGAGE IN MODERATE TO STRENUOUS EXERCISE (LIKE A BRISK WALK)?: 2 DAYS

## 2024-02-20 SDOH — HEALTH STABILITY: PHYSICAL HEALTH: ON AVERAGE, HOW MANY MINUTES DO YOU ENGAGE IN EXERCISE AT THIS LEVEL?: 10 MIN

## 2024-02-20 ASSESSMENT — ANXIETY QUESTIONNAIRES
1. FEELING NERVOUS, ANXIOUS, OR ON EDGE: SEVERAL DAYS
7. FEELING AFRAID AS IF SOMETHING AWFUL MIGHT HAPPEN: NOT AT ALL
3. WORRYING TOO MUCH ABOUT DIFFERENT THINGS: MORE THAN HALF THE DAYS
GAD7 TOTAL SCORE: 3
6. BECOMING EASILY ANNOYED OR IRRITABLE: NOT AT ALL
IF YOU CHECKED OFF ANY PROBLEMS ON THIS QUESTIONNAIRE, HOW DIFFICULT HAVE THESE PROBLEMS MADE IT FOR YOU TO DO YOUR WORK, TAKE CARE OF THINGS AT HOME, OR GET ALONG WITH OTHER PEOPLE: NOT DIFFICULT AT ALL
7. FEELING AFRAID AS IF SOMETHING AWFUL MIGHT HAPPEN: NOT AT ALL
4. TROUBLE RELAXING: NOT AT ALL
GAD7 TOTAL SCORE: 3
8. IF YOU CHECKED OFF ANY PROBLEMS, HOW DIFFICULT HAVE THESE MADE IT FOR YOU TO DO YOUR WORK, TAKE CARE OF THINGS AT HOME, OR GET ALONG WITH OTHER PEOPLE?: NOT DIFFICULT AT ALL
2. NOT BEING ABLE TO STOP OR CONTROL WORRYING: NOT AT ALL
5. BEING SO RESTLESS THAT IT IS HARD TO SIT STILL: NOT AT ALL
GAD7 TOTAL SCORE: 3

## 2024-02-20 ASSESSMENT — PATIENT HEALTH QUESTIONNAIRE - PHQ9
10. IF YOU CHECKED OFF ANY PROBLEMS, HOW DIFFICULT HAVE THESE PROBLEMS MADE IT FOR YOU TO DO YOUR WORK, TAKE CARE OF THINGS AT HOME, OR GET ALONG WITH OTHER PEOPLE: NOT DIFFICULT AT ALL
SUM OF ALL RESPONSES TO PHQ QUESTIONS 1-9: 2
SUM OF ALL RESPONSES TO PHQ QUESTIONS 1-9: 2

## 2024-02-20 ASSESSMENT — SOCIAL DETERMINANTS OF HEALTH (SDOH): HOW OFTEN DO YOU GET TOGETHER WITH FRIENDS OR RELATIVES?: ONCE A WEEK

## 2024-02-20 NOTE — PROGRESS NOTES
Preventive Care Visit  [facility]  Estevan Blair MD, Family Medicine  Feb 20, 2024      Assessment & Plan     Routine general medical examination at a health care facility  Overall doing okay, he feels some stress in regards to planning to retire and having a hard time selling his home.    Hypertension goal BP (blood pressure) < 130/80  Controlled - continue medication(s).  - COMPREHENSIVE METABOLIC PANEL  - Albumin Random Urine Quantitative with Creat Ratio  - amLODIPine (NORVASC) 10 MG tablet  Dispense: 90 tablet; Refill: 4  - cloNIDine (CATAPRES) 0.1 MG tablet  Dispense: 180 tablet; Refill: 4  - metoprolol succinate ER (TOPROL XL) 50 MG 24 hr tablet  Dispense: 90 tablet; Refill: 4  - lisinopril (ZESTRIL) 40 MG tablet  Dispense: 90 tablet; Refill: 4  - furosemide (LASIX) 20 MG tablet  Dispense: 180 tablet; Refill: 4    Thalassemia, unspecified type  Stable - continue medication(s).  - CBC with Platelets    Screening for prostate cancer  - PROSTATE SPEC ANTIGEN SCREEN    Situational anxiety  Controlled - continue medication(s).  - FLUoxetine (PROZAC) 40 MG capsule  Dispense: 90 capsule; Refill: 4    Hyperlipidemia LDL goal <100  Controlled - continue medication(s).  - Lipid panel reflex to direct LDL Non-fasting  - atorvastatin (LIPITOR) 40 MG tablet  Dispense: 90 tablet; Refill: 4    Severe obesity (BMI 35.0-39.9) with comorbidity (H)  Healthy diet and exercise recommend    Screen for colon cancer  - SALVADOR([employer])    Personal history of tobacco use  - Prof fee: Shared Decision Making for Lung Cancer Screening  - CT Chest Lung Cancer Scrn Low Dose wo    Hyperparathyroidism (H24)  Hypercalcemia  Hypercalcemia noted last year and found to be hyperparathyroid.  Referred to endocrine but he never did make that appointment, will refer back to endocrine:  - Adult Endocrinology  Referral  - Parathyroid Hormone Intact    COUNSELING:  Reviewed preventive health counseling,  as reflected in patient instructions      Nicotine/Tobacco Cessation  He reports that he has been smoking cigarettes. He has a 35 pack-year smoking history. He has never used smokeless tobacco.  Nicotine/Tobacco Cessation Plan  Self help information given to patient      Counseling  Appropriate preventive services were discussed with this patient, including applicable screening as appropriate for fall prevention, nutrition, physical activity, Tobacco-use cessation, weight loss and cognition.  Checklist reviewing preventive services available has been given to the patient.  Reviewed patient's diet, addressing concerns and/or questions.   He is at risk for lack of exercise and has been provided with information to increase physical activity for the benefit of his well-being.   The patient was instructed to see the dentist every 6 months.       No follow-ups on file.    Follow-up Visit   Expected date:  Feb 20, 2025 (Approximate)      Follow Up Appointment Details:     Follow-up with whom?: PCP    Follow-Up for what?: Adult Preventive    Any Additional Chronic Condition Management?:  Hypertension  Hyperlipidemia       How?: In Person                         Chance Blair MD     [address] [address]  [email or web address]     Office: [phone]         Meghna Garham is a 63 year old, presenting for the following:  Physical (Not Fasting (had banana))    Health Care Directive  Patient does not have a Health Care Directive or Living Will: Discussed advance care planning with patient; however, patient declined at this time.    HPI        Hyperlipidemia Follow-Up    Are you regularly taking any medication or supplement to lower your cholesterol?   Yes- atorvastatin (LIPITOR) 40 MG tablet  Are you having muscle aches or other side effects that you think could be caused by your cholesterol lowering medication?  No    Hypertension Follow-up    Do you check your blood  pressure regularly outside of the clinic? No   Are you following a low salt diet? Yes  Are your blood pressures ever more than 140 on the top number (systolic) OR more   than 90 on the bottom number (diastolic), for example 140/90? N/A    BP Readings from Last 2 Encounters:   02/20/24 126/68   09/22/23 120/74     LDL Cholesterol Calculated (mg/dL)   Date Value   02/17/2023 126 (H)   01/18/2022 123 (H)   03/14/2019 123 (H)   01/05/2018 172 (H)     Depression and Anxiety Follow-Up  How are you doing with your depression since your last visit? Worsened slightly  How are you doing with your anxiety since your last visit?  No change  Are you having other symptoms that might be associated with depression or anxiety? No  Have you had a significant life event? OTHER: Stress    Do you have any concerns with your use of alcohol or other drugs? No    Social History     Tobacco Use    Smoking status: Every Day     Packs/day: 1.00     Years: 35.00     Additional pack years: 0.00     Total pack years: 35.00     Types: Cigarettes    Smokeless tobacco: Never   Vaping Use    Vaping Use: Never used   Substance Use Topics    Alcohol use: Yes     Alcohol/week: 0.0 standard drinks of alcohol    Drug use: No     Comment: no herbal meds either          1/18/2022     9:08 AM 2/17/2023    10:02 AM 2/20/2024     8:33 AM   PHQ   PHQ-9 Total Score 2 0 2   Q9: Thoughts of better off dead/self-harm past 2 weeks Not at all Not at all Not at all         1/18/2022     9:08 AM 2/17/2023    10:04 AM 2/20/2024     8:37 AM   GEORGINA-7 SCORE   Total Score  9 (mild anxiety) 3 (minimal anxiety)   Total Score 1 9 3         2/20/2024     8:33 AM   Last PHQ-9   1.  Little interest or pleasure in doing things 0   2.  Feeling down, depressed, or hopeless 0   3.  Trouble falling or staying asleep, or sleeping too much 0   4.  Feeling tired or having little energy 2   5.  Poor appetite or overeating 0   6.  Feeling bad about yourself 0   7.  Trouble concentrating 0    8.  Moving slowly or restless 0   Q9: Thoughts of better off dead/self-harm past 2 weeks 0   PHQ-9 Total Score 2         2/20/2024     8:37 AM   GEORGINA-7    1. Feeling nervous, anxious, or on edge 1   2. Not being able to stop or control worrying 0   3. Worrying too much about different things 2   4. Trouble relaxing 0   5. Being so restless that it is hard to sit still 0   6. Becoming easily annoyed or irritable 0   7. Feeling afraid, as if something awful might happen 0   GEORGINA-7 Total Score 3   If you checked any problems, how difficult have they made it for you to do your work, take care of things at home, or get along with other people? Not difficult at all     Suicide Assessment Five-step Evaluation and Treatment (SAFE-T)      2/20/2024   General Health   How would you rate your overall physical health? Good   Feel stress (tense, anxious, or unable to sleep) To some extent   (!) STRESS CONCERN      2/20/2024   Nutrition   Three or more servings of calcium each day? (!) NO   Diet: Low salt   How many servings of fruit and vegetables per day? (!) 0-1   How many sweetened beverages each day? 0-1         2/20/2024   Exercise   Days per week of moderate/strenous exercise 2 days   Average minutes spent exercising at this level 10 min   (!) EXERCISE CONCERN      2/20/2024   Social Factors   Frequency of gathering with friends or relatives Once a week   Worry food won't last until get money to buy more No   Food not last or not have enough money for food? No   Do you have housing?  Yes   Are you worried about losing your housing? No   Lack of transportation? No   Unable to get utilities (heat,electricity)? No         2/20/2024   Fall Risk   Fallen 2 or more times in the past year? No   Trouble with walking or balance? No          2/20/2024   Dental   Dentist two times every year? (!) NO         2/20/2024   TB Screening   Were you born outside of [place]?  No       Today's PHQ-9 Score:       2/20/2024     8:33 AM   PHQ-9 SCORE  "  PHQ-9 Total Score MyChart 2 (Minimal depression)   PHQ-9 Total Score 2         2/20/2024   Substance Use   Alcohol more than 3/day or more than 7/wk No   Do you use any other substances recreationally? No     Social History     Tobacco Use    Smoking status: Every Day     Packs/day: 1.00     Years: 35.00     Additional pack years: 0.00     Total pack years: 35.00     Types: Cigarettes    Smokeless tobacco: Never   Vaping Use    Vaping Use: Never used   Substance Use Topics    Alcohol use: Yes     Alcohol/week: 0.0 standard drinks of alcohol    Drug use: No     Comment: no herbal meds either            2/20/2024   STI Screening   New sexual partner(s) since last STI/HIV test? No   Last PSA:   PSA   Date Value Ref Range Status   04/15/2021 2.88 0 - 4 ug/L Final     Comment:     Assay Method:  Chemiluminescence using Siemens Vista analyzer     Prostate Specific Antigen Screen   Date Value Ref Range Status   02/17/2023 2.40 0.00 - 4.50 ng/mL Final   01/18/2022 3.00 0.00 - 4.00 ug/L Final            Reviewed and updated as needed this visit by Provider   Tobacco  Allergies  Meds  Problems  Med Hx  Surg Hx  Fam Hx     Sexual Activity             Objective    Exam  /68   Pulse 61   Temp 97.2  F (36.2  C) (Tympanic)   Resp 13   Ht 1.676 m (5' 6\")   Wt 104.3 kg (230 lb)   SpO2 97%   BMI 37.12 kg/m     Estimated body mass index is 37.12 kg/m  as calculated from the following:    Height as of this encounter: 1.676 m (5' 6\").    Weight as of this encounter: 104.3 kg (230 lb).    Physical Exam  GENERAL: healthy, alert and no distress  EYES: Eyes grossly normal to inspection, PERRL and conjunctivae and sclerae normal  HENT: ear canals and TM's normal, nose and mouth without ulcers or lesions  NECK: no adenopathy, no asymmetry, masses, or scars and thyroid normal to palpation  RESP: lungs clear to auscultation - no rales, rhonchi or wheezes  BREAST: normal without masses, tenderness or nipple discharge and " no palpable axillary masses or adenopathy  CV: regular rate and rhythm, normal S1 S2, no S3 or S4, no murmur, click or rub, no peripheral edema and peripheral pulses strong  ABDOMEN: soft, nontender, no hepatosplenomegaly, no masses and bowel sounds normal   (male): normal male genitalia without lesions or urethral discharge, no hernia  MS: no gross musculoskeletal defects noted, no edema  SKIN: no suspicious lesions or rashes  NEURO: Normal strength and tone, mentation intact and speech normal  PSYCH: mentation appears normal, affect normal/bright  LYMPH: no cervical, supraclavicular, axillary, or inguinal adenopathy  RECTAL: declined exam      Signed Electronically by: Estevan Blair MD  Lung Cancer Screening Shared Decision Making Visit     Estevan Aaron, a 63 year old male, is eligible for lung cancer screening    History   Smoking Status    Every Day    Packs/day: 1.00    Years: 35.00    Types: Cigarettes   Smokeless Tobacco    Never       I have discussed with patient the risks and benefits of screening for lung cancer with low-dose CT.     The risks include:    radiation exposure: one low dose chest CT has as much ionizing radiation as about 15 chest x-rays, or 6 months of background radiation living in [place]      false positives: most findings/nodules are NOT cancer, but some might still require additional diagnostic evaluation, including biopsy    over-diagnosis: some slow growing cancers that might never have been clinically significant will be detected and treated unnecessarily     The benefit of early detection of lung cancer is contingent upon adherence to annual screening or more frequent follow up if indicated.     Furthermore, to benefit from screening, Estevan must be willing and able to undergo diagnostic procedures, if indicated. Although no specific guide is available for determining severity of comorbidities, it is reasonable to withhold screening in patients who have greater mortality  risk from other diseases.     We did discuss that the best way to prevent lung cancer is to not smoke.    Some patients may value a numeric estimation of lung cancer risk when evaluating if lung cancer screening is right for them, here is one calculator:    ShouldIScreen

## 2024-02-20 NOTE — LETTER
February 21, 2024      Santos PRABHAKAR Page  [id number] [place] [address]        Dear ,    We are writing to inform you of your test results.    -Normal red blood cell (hgb) levels, normal white blood cell count and normal platelet levels.   -PSA (prostate specific antigen) test is normal.  This indicates a low likelihood of prostate cancer.  ADVISE: rechecking this in 1 year.   -Cholesterol levels are at your goal levels.  ADVISE: continuing your medication, a regular exercise program with at least 150 minutes of aerobic exercise per week, and eating a low saturated fat/low carbohydrate diet.  Also, you should recheck this fasting cholesterol panel in 12 months.   -Liver and gallbladder tests (ALT,AST, Alk phos,bilirubin) are normal.   -Kidney function (GFR) is normal.   -Sodium is normal.   -Potassium is normal.   -Calcium is elevated.  ADVISE: Make an appointment with an endocrinologist as discussed.   -Glucose (diabetic screening test) is normal.   -Microalbumin (urine protein) test is normal.  ADVISE: rechecking this annually.   -Parathyroid hormone level is still elevated and you should see an endocrinologist as recommended.     For additional lab test information, www.testing.com is a very good reference.     Resulted Orders   Albumin Random Urine Quantitative with Creat Ratio   Result Value Ref Range    Creatinine Urine mg/dL 79.3 mg/dL      Comment:      The reference ranges have not been established in urine creatinine. The results should be integrated into the clinical context for interpretation.    Albumin Urine mg/L <12.0 mg/L      Comment:      The reference ranges have not been established in urine albumin. The results should be integrated into the clinical context for interpretation.    Albumin Urine mg/g Cr        Comment:      Unable to calculate, urine albumin and/or urine creatinine is outside detectable limits.  Microalbuminuria is defined as an albumin:creatinine ratio of 17 to 299 for  males and 25 to 299 for females. A ratio of albumin:creatinine of 300 or higher is indicative of overt proteinuria.  Due to biologic variability, positive results should be confirmed by a second, first-morning random or 24-hour timed urine specimen. If there is discrepancy, a third specimen is recommended. When 2 out of 3 results are in the microalbuminuria range, this is evidence for incipient nephropathy and warrants increased efforts at glucose control, blood pressure control, and institution of therapy with an angiotensin-converting-enzyme (ACE) inhibitor (if the patient can tolerate it).     COMPREHENSIVE METABOLIC PANEL   Result Value Ref Range    Sodium 138 135 - 145 mmol/L      Comment:      Reference intervals for this test were updated on 09/26/2023 to more accurately reflect our healthy population. There may be differences in the flagging of prior results with similar values performed with this method. Interpretation of those prior results can be made in the context of the updated reference intervals.     Potassium 4.8 3.4 - 5.3 mmol/L    Carbon Dioxide (CO2) 29 22 - 29 mmol/L    Anion Gap 7 7 - 15 mmol/L    Urea Nitrogen 14.8 8.0 - 23.0 mg/dL    Creatinine 1.00 0.67 - 1.17 mg/dL    GFR Estimate 84 >60 mL/min/1.73m2    Calcium 11.4 (H) 8.8 - 10.2 mg/dL    Chloride 102 98 - 107 mmol/L    Glucose 97 70 - 99 mg/dL    Alkaline Phosphatase 110 40 - 150 U/L      Comment:      Reference intervals for this test were updated on 11/14/2023 to more accurately reflect our healthy population. There may be differences in the flagging of prior results with similar values performed with this method. Interpretation of those prior results can be made in the context of the updated reference intervals.    AST 34 0 - 45 U/L      Comment:      Reference intervals for this test were updated on 6/12/2023 to more accurately reflect our healthy population. There may be differences in the flagging of prior results with similar  values performed with this method. Interpretation of those prior results can be made in the context of the updated reference intervals.    ALT 45 0 - 70 U/L      Comment:      Reference intervals for this test were updated on 6/12/2023 to more accurately reflect our healthy population. There may be differences in the flagging of prior results with similar values performed with this method. Interpretation of those prior results can be made in the context of the updated reference intervals.      Protein Total 7.2 6.4 - 8.3 g/dL    Albumin 4.8 3.5 - 5.2 g/dL    Bilirubin Total 0.9 <=1.2 mg/dL   Lipid panel reflex to direct LDL Non-fasting   Result Value Ref Range    Cholesterol 192 <200 mg/dL    Triglycerides 244 (H) <150 mg/dL    Direct Measure HDL 34 (L) >=40 mg/dL    LDL Cholesterol Calculated 109 (H) <=100 mg/dL    Non HDL Cholesterol 158 (H) <130 mg/dL    Patient Fasting > 8hrs? Yes     Narrative    Cholesterol  Desirable:  <200 mg/dL    Triglycerides  Normal:  Less than 150 mg/dL  Borderline High:  150-199 mg/dL  High:  200-499 mg/dL  Very High:  Greater than or equal to 500 mg/dL    Direct Measure HDL  Female:  Greater than or equal to 50 mg/dL   Male:  Greater than or equal to 40 mg/dL    LDL Cholesterol  Desirable:  <100mg/dL  Above Desirable:  100-129 mg/dL   Borderline High:  130-159 mg/dL   High:  160-189 mg/dL   Very High:  >= 190 mg/dL    Non HDL Cholesterol  Desirable:  130 mg/dL  Above Desirable:  130-159 mg/dL  Borderline High:  160-189 mg/dL  High:  190-219 mg/dL  Very High:  Greater than or equal to 220 mg/dL   CBC with Platelets   Result Value Ref Range    WBC Count 9.8 4.0 - 11.0 10e3/uL    RBC Count 7.66 (H) 4.40 - 5.90 10e6/uL    Hemoglobin 15.6 13.3 - 17.7 g/dL    Hematocrit 48.1 40.0 - 53.0 %    MCV 63 (L) 78 - 100 fL    MCH 20.4 (L) 26.5 - 33.0 pg    MCHC 32.4 31.5 - 36.5 g/dL    RDW 19.3 (H) 10.0 - 15.0 %    Platelet Count 214 150 - 450 10e3/uL   PROSTATE SPEC ANTIGEN SCREEN   Result Value  Ref Range    Prostate Specific Antigen Screen 2.32 0.00 - 4.50 ng/mL    Narrative    This result is obtained using the Roche Elecsys total PSA method on the dayron e801 immunoassay analyzer. Results obtained with different assay methods or kits cannot be used interchangeably.   Parathyroid Hormone Intact   Result Value Ref Range    Parathyroid Hormone Intact 100 (H) 15 - 65 pg/mL    Narrative    This result was obtained with the Roche Elecsys PTH STAT assay.   This reference range differs from PTH assays used in other [facility] laboratories.       If you have any questions or concerns, please call the clinic at the number listed above.       Sincerely,              Chance Blair M.D.

## 2024-02-20 NOTE — PATIENT INSTRUCTIONS
Prostate Cancer Screening: Care Instructions  Overview     Prostate cancer is the abnormal growth of cells in the prostate. The prostate is part of the male reproductive system. It is a small organ below the bladder that makes fluid for semen.  Screening can help find prostate cancer early. When it's found and treated early, the cancer may be cured. But it's not always treated. That's because the treatments can cause serious side effects. In most cases, prostate cancer isn't life-threatening. This is especially true in someone who is older and when the cancer grows slowly.  Prostate cancer is a common type of cancer. Most cases occur after age 65. The disease runs in families. And it's more common in  Americans.  Follow-up care is a key part of your treatment and safety. Be sure to make and go to all appointments, and call your doctor if you are having problems. It's also a good idea to know your test results and keep a list of the medicines you take.  What is the screening test for prostate cancer?  The main screening test for prostate cancer is the prostate-specific antigen (PSA) test. This is a blood test that measures how much PSA is in your blood. A high level may mean that you have an enlarged prostate, an infection, or cancer.  Along with the PSA test, you may have a digital (finger) rectal exam. This exam checks for anything abnormal in your prostate. To do the exam, the doctor puts a lubricated, gloved finger into your rectum.  If these tests suggest cancer, you may need a prostate biopsy to see if there are cancer cells in the prostate.  What are the pros and cons of screening?  Neither a PSA test nor a digital rectal exam can tell you for sure that you do or do not have cancer. But they can help you decide if you need more tests, such as a prostate biopsy. Screening tests may be useful because prostate cancer often doesn't cause symptoms. It can be hard to know if you have cancer until it's more  "advanced. And then it's harder to treat.  But having a PSA test can also cause harm. The test may show high levels of PSA that aren't caused by cancer. So you could have a prostate biopsy you didn't need. Or the PSA test might be normal when there is cancer, so a cancer might not be found early. The test can also find cancers that would never have caused a problem during your lifetime. So you might have treatment that wasn't needed.  Prostate cancer usually develops late in life and grows slowly. In most cases, it doesn't shorten lives.  Talk with your doctor to see if screening is right for you.  Where can you learn more?  Go to https://www.[employer].net/patiented  Enter R550 in the search box to learn more about \"Prostate Cancer Screening: Care Instructions.\"  Current as of: February 28, 2023               Content Version: 13.8    2123-3321 [employer].   Care instructions adapted under license by your healthcare professional. If you have questions about a medical condition or this instruction, always ask your healthcare professional. [employer] disclaims any warranty or liability for your use of this information.      Preventive Care Advice   This is general advice given by our system to help you stay healthy. However, your care team may have specific advice just for you. Please talk to your care team about your preventive care needs.  Nutrition  Eat 5 or more servings of fruits and vegetables each day.  Try wheat bread, brown rice and whole grain pasta (instead of white bread, rice, and pasta).  Get enough calcium and vitamin D. Check the label on foods and aim for 100% of the RDA (recommended daily allowance).  Lifestyle  Exercise at least 150 minutes each week  (30 minutes a day, 5 days a week).  Do muscle strengthening activities 2 days a week. These help control your weight and prevent disease.  No smoking.  Wear sunscreen to prevent skin cancer.  Have a dental exam and cleaning " every 6 months.  Yearly exams  See your health care team every year to talk about:  Any changes in your health.  Any medicines your care team has prescribed.  Preventive care, family planning, and ways to prevent chronic diseases.  Shots (vaccines)   HPV shots (up to age 26), if you've never had them before.  Hepatitis B shots (up to age 59), if you've never had them before.  COVID-19 shot: Get this shot when it's due.  Flu shot: Get a flu shot every year.  Tetanus shot: Get a tetanus shot every 10 years.  Pneumococcal, hepatitis A, and RSV shots: Ask your care team if you need these based on your risk.  Shingles shot (for age 50 and up)  General health tests  Diabetes screening:  Starting at age 35, Get screened for diabetes at least every 3 years.  If you are younger than age 35, ask your care team if you should be screened for diabetes.  Cholesterol test: At age 39, start having a cholesterol test every 5 years, or more often if advised.  Bone density scan (DEXA): At age 50, ask your care team if you should have this scan for osteoporosis (brittle bones).  Hepatitis C: Get tested at least once in your life.  STIs (sexually transmitted infections)  Before age 24: Ask your care team if you should be screened for STIs.  After age 24: Get screened for STIs if you're at risk. You are at risk for STIs (including HIV) if:  You are sexually active with more than one person.  You don't use condoms every time.  You or a partner was diagnosed with a sexually transmitted infection.  If you are at risk for HIV, ask about PrEP medicine to prevent HIV.  Get tested for HIV at least once in your life, whether you are at risk for HIV or not.  Cancer screening tests  Cervical cancer screening: If you have a cervix, begin getting regular cervical cancer screening tests starting at age 21.  Breast cancer scan (mammogram): If you've ever had breasts, begin having regular mammograms starting at age 40. This is a scan to check for  breast cancer.  Colon cancer screening: It is important to start screening for colon cancer at age 45.  Have a colonoscopy test every 10 years (or more often if you're at risk) Or, ask your provider about stool tests like a FIT test every year or Cologuard test every 3 years.  To learn more about your testing options, visit:   https://www.[employer]/595539.pdf.  For help making a decision, visit:   https://bit.ly/up35543.  Prostate cancer screening test: If you have a prostate, ask your care team if a prostate cancer screening test (PSA) at age 55 is right for you.  Lung cancer screening: If you are a current or former smoker ages 50 to 80, ask your care team if ongoing lung cancer screenings are right for you.  For informational purposes only. Not to replace the advice of your health care provider. Copyright   2023 [facility]. All rights reserved. Clinically reviewed by the [facility]. Ze[employer] [id number] - REV 01/24.    Learning About Stress  What is stress?     Stress is your body's response to a hard situation. Your body can have a physical, emotional, or mental response. Stress is a fact of life for most people, and it affects everyone differently. What causes stress for you may not be stressful for someone else.  A lot of things can cause stress. You may feel stress when you go on a job interview, take a test, or run a race. This kind of short-term stress is normal and even useful. It can help you if you need to work hard or react quickly. For example, stress can help you finish an important job on time.  Long-term stress is caused by ongoing stressful situations or events. Examples of long-term stress include long-term health problems, ongoing problems at work, or conflicts in your family. Long-term stress can harm your health.  How does stress affect your health?  When you are stressed, your body responds as though you are in danger. It makes hormones that speed up  your heart, make you breathe faster, and give you a burst of energy. This is called the fight-or-flight stress response. If the stress is over quickly, your body goes back to normal and no harm is done.  But if stress happens too often or lasts too long, it can have bad effects. Long-term stress can make you more likely to get sick, and it can make symptoms of some diseases worse. If you tense up when you are stressed, you may develop neck, shoulder, or low back pain. Stress is linked to high blood pressure and heart disease.  Stress also harms your emotional health. It can make you caruso, tense, or depressed. Your relationships may suffer, and you may not do well at work or school.  What can you do to manage stress?  You can try these things to help manage stress:   Do something active. Exercise or activity can help reduce stress. Walking is a great way to get started. Even everyday activities such as housecleaning or yard work can help.  Try yoga or kermit chi. These techniques combine exercise and meditation. You may need some training at first to learn them.  Do something you enjoy. For example, listen to music or go to a movie. Practice your hobby or do volunteer work.  Meditate. This can help you relax, because you are not worrying about what happened before or what may happen in the future.  Do guided imagery. Imagine yourself in any setting that helps you feel calm. You can use online videos, books, or a teacher to guide you.  Do breathing exercises. For example:  From a standing position, bend forward from the waist with your knees slightly bent. Let your arms dangle close to the floor.  Breathe in slowly and deeply as you return to a standing position. Roll up slowly and lift your head last.  Hold your breath for just a few seconds in the standing position.  Breathe out slowly and bend forward from the waist.  Let your feelings out. Talk, laugh, cry, and express anger when you need to. Talking with supportive  "friends or family, a counselor, or a cruzito leader about your feelings is a healthy way to relieve stress. Avoid discussing your feelings with people who make you feel worse.  Write. It may help to write about things that are bothering you. This helps you find out how much stress you feel and what is causing it. When you know this, you can find better ways to cope.  What can you do to prevent stress?  You might try some of these things to help prevent stress:  Manage your time. This helps you find time to do the things you want and need to do.  Get enough sleep. Your body recovers from the stresses of the day while you are sleeping.  Get support. Your family, friends, and community can make a difference in how you experience stress.  Limit your news feed. Avoid or limit time on social media or news that may make you feel stressed.  Do something active. Exercise or activity can help reduce stress. Walking is a great way to get started.  Where can you learn more?  Go to https://www.[employer].net/patiented  Enter N032 in the search box to learn more about \"Learning About Stress.\"  Current as of: February 26, 2023               Content Version: 13.8    6067-3640 [employer].   Care instructions adapted under license by your healthcare professional. If you have questions about a medical condition or this instruction, always ask your healthcare professional. [employer] disclaims any warranty or liability for your use of this information.      Lung Cancer Screening   Frequently Asked Questions  If you are at high-risk for lung cancer, getting screened with low-dose computed tomography (LDCT) every year can help save your life. This handout offers answers to some of the most common questions about lung cancer screening. If you have other questions, please call [phone] ([phone]).     What is it?  Lung cancer screening uses special X-ray technology to create an image of your lung " tissue. The exam is quick and easy and takes less than 10 seconds. We don t give you any medicine or use any needles. You can eat before and after the exam. You don t need to change your clothes as long as the clothing on your chest doesn t contain metal. But, you do need to be able to hold your breath for at least 6 seconds during the exam.    What is the goal of lung cancer screening?  The goal of lung cancer screening is to save lives. Many times, lung cancer is not found until a person starts having physical symptoms. Lung cancer screening can help detect lung cancer in the earliest stages when it may be easier to treat.    Who should be screened for lung cancer?  We suggest lung cancer screening for anyone who is at high-risk for lung cancer. You are in the high-risk group if you:      are between the ages of 55 and 79, and    have smoked at least 1 pack of cigarettes a day for 20 or more years, and    still smoke or have quit within the past 15 years.    However, if you have a new cough or shortness of breath, you should talk to your doctor before being screened.    Why does it matter if I have symptoms?  Certain symptoms can be a sign that you have a condition in your lungs that should be checked and treated by your doctor. These symptoms include fever, chest pain, a new or changing cough, shortness of breath that you have never felt before, coughing up blood or unexplained weight loss. Having any of these symptoms can greatly affect the results of lung cancer screening.       Should all smokers get an LDCT lung cancer screening exam?  It depends. Lung cancer screening is for a very specific group of men and women who have a history of heavy smoking over a long period of time (see  Who should be screened for lung cancer  above).  I am in the high-risk group, but have been diagnosed with cancer in the past. Is LDCT lung cancer screening right for me?  In some cases, you should not have LDCT lung screening,  such as when your doctor is already following your cancer with CT scan studies. Your doctor will help you decide if LDCT lung screening is right for you.  Do I need to have a screening exam every year?  Yes. If you are in the high-risk group described earlier, you should get an LDCT lung cancer screening exam every year until you are 79, or are no longer willing or able to undergo screening and possible procedures to diagnose and treat lung cancer.  How effective is LDCT at preventing death from lung cancer?  Studies have shown that LDCT lung cancer screening can lower the risk of death from lung cancer by 20 percent in people who are at high-risk.  What are the risks?  There are some risks and limitations of LDCT lung cancer screening. We want to make sure you understand the risks and benefits, so please let us know if you have any questions. Your doctor may want to talk with you more about these risks.    Radiation exposure: As with any exam that uses radiation, there is a very small increased risk of cancer. The amount of radiation in LDCT is small--about the same amount a person would get from a mammogram. Your doctor orders the exam when he or she feels the potential benefits outweigh the risks.    False negatives: No test is perfect, including LDCT. It is possible that you may have a medical condition, including lung cancer, that is not found during your exam. This is called a false negative result.    False positives and more testing: LDCT very often finds something in the lung that could be cancer, but in fact is not. This is called a false positive result. False positive tests often cause anxiety. To make sure these findings are not cancer, you may need to have more tests. These tests will be done only if you give us permission. Sometimes patients need a treatment that can have side effects, such as a biopsy. For more information on false positives, see  What can I expect from the results?     Findings not  related to lung cancer: Your LDCT exam also takes pictures of areas of your body next to your lungs. In a very small number of cases, the CT scan will show an abnormal finding in one of these areas, such as your kidneys, adrenal glands, liver or thyroid. This finding may not be serious, but you may need more tests. Your doctor can help you decide what other tests you may need, if any.  What can I expect from the results?  About 1 out of 4 LDCT exams will find something that may need more tests. Most of the time, these findings are lung nodules. Lung nodules are very small collections of tissue in the lung. These nodules are very common, and the vast majority--more than 97 percent--are not cancer (benign). Most are normal lymph nodes or small areas of scarring from past infections.  But, if a small lung nodule is found to be cancer, the cancer can be cured more than 90 percent of the time. To know if the nodule is cancer, we may need to get more images before your next yearly screening exam. If the nodule has suspicious features (for example, it is large, has an odd shape or grows over time), we will refer you to a specialist for further testing.  Will my doctor also get the results?  Yes. Your doctor will get a copy of your results.  Is it okay to keep smoking now that there s a cancer screening exam?  No. Tobacco is one of the strongest cancer-causing agents. It causes not only lung cancer, but other cancers and cardiovascular (heart) diseases as well. The damage caused by smoking builds over time. This means that the longer you smoke, the higher your risk of disease. While it is never too late to quit, the sooner you quit, the better.  Where can I find help to quit smoking?  The best way to prevent lung cancer is to stop smoking. If you have already quit smoking, congratulations and keep it up! For help on quitting smoking, please call [employer] at 2-361-QUIT-NOW (1-604.609.9030) or the American [employer] Society  at 1-165.360.4158 to find local resources near you.  One-on-one health coaching:  If you d prefer to work individually with a health care provider on tobacco cessation, we offer:      Medication Therapy Management:  Our specially trained pharmacists work closely with you and your doctor to help you quit smoking.  Call [phone] or [phone] (toll free).

## 2024-02-21 NOTE — RESULT ENCOUNTER NOTE
Note to Staff: please send a result letter    -Normal red blood cell (hgb) levels, normal white blood cell count and normal platelet levels.  -PSA (prostate specific antigen) test is normal.  This indicates a low likelihood of prostate cancer.  ADVISE: rechecking this in 1 year.  -Cholesterol levels are at your goal levels.  ADVISE: continuing your medication, a regular exercise program with at least 150 minutes of aerobic exercise per week, and eating a low saturated fat/low carbohydrate diet.  Also, you should recheck this fasting cholesterol panel in 12 months.  -Liver and gallbladder tests (ALT,AST, Alk phos,bilirubin) are normal.  -Kidney function (GFR) is normal.  -Sodium is normal.  -Potassium is normal.  -Calcium is elevated.  ADVISE: Make an appointment with an endocrinologist as discussed.  -Glucose (diabetic screening test) is normal.  -Microalbumin (urine protein) test is normal.  ADVISE: rechecking this annually.   -Parathyroid hormone level is still elevated and you should see an endocrinologist as recommended.    For additional lab test information, www.testing.com is a very good reference.

## 2024-03-18 DIAGNOSIS — I10 HYPERTENSION GOAL BP (BLOOD PRESSURE) < 130/80: ICD-10-CM

## 2024-03-18 RX ORDER — LISINOPRIL 40 MG/1
40 TABLET ORAL DAILY
Qty: 90 TABLET | Refills: 3 | OUTPATIENT
Start: 2024-03-18

## 2024-04-11 ENCOUNTER — TRANSFERRED RECORDS (OUTPATIENT)
Dept: HEALTH INFORMATION MANAGEMENT | Facility: CLINIC | Age: 64
End: 2024-04-11
Payer: COMMERCIAL

## 2024-04-18 ENCOUNTER — TRANSFERRED RECORDS (OUTPATIENT)
Dept: HEALTH INFORMATION MANAGEMENT | Facility: CLINIC | Age: 64
End: 2024-04-18
Payer: COMMERCIAL

## 2024-09-03 ENCOUNTER — ALLIED HEALTH/NURSE VISIT (OUTPATIENT)
Dept: FAMILY MEDICINE | Facility: CLINIC | Age: 64
End: 2024-09-03
Payer: COMMERCIAL

## 2024-09-03 VITALS — SYSTOLIC BLOOD PRESSURE: 143 MMHG | HEART RATE: 61 BPM | DIASTOLIC BLOOD PRESSURE: 79 MMHG

## 2024-09-03 DIAGNOSIS — I10 HYPERTENSION GOAL BP (BLOOD PRESSURE) < 130/80: Primary | ICD-10-CM

## 2024-09-03 PROCEDURE — 99207 PR NO CHARGE NURSE ONLY: CPT | Performed by: FAMILY MEDICINE

## 2024-09-03 NOTE — PROGRESS NOTES
Estevan Aaron was evaluated at [facility] on September 3, 2024 at which time his blood pressure was:    BP Readings from Last 1 Encounters:   09/03/24 (!) 143/79     No data recorded      Reviewed lifestyle modifications for blood pressure control and reduction: including making healthy food choices, managing weight, getting regular exercise, smoking cessation, reducing alcohol consumption, monitoring blood pressure regularly.     Symptoms: None    BP Goal:Other: <130/80    BP Assessment:  BP too high    Potential Reasons for BP too high: Anxiety    BP Follow-Up Plan: Recheck BP in 30 days in the pharmacy.Date of next scheduled BP visit or call: Estevan did not want to schedule at time we will call when due in 30 days. He is under some stress right now which is probably contributing to his elevated BP.    Recommendation to Provider: we will recheck in 30 days.    Laila Mane, PharmD  [facility]  PH: [phone]      Note completed by: Laila Mane [facility]

## 2024-09-27 ENCOUNTER — ALLIED HEALTH/NURSE VISIT (OUTPATIENT)
Dept: FAMILY MEDICINE | Facility: CLINIC | Age: 64
End: 2024-09-27
Payer: COMMERCIAL

## 2024-09-27 VITALS — DIASTOLIC BLOOD PRESSURE: 91 MMHG | SYSTOLIC BLOOD PRESSURE: 171 MMHG

## 2024-09-27 DIAGNOSIS — I10 HYPERTENSION GOAL BP (BLOOD PRESSURE) < 130/80: Primary | ICD-10-CM

## 2024-09-27 PROCEDURE — 99207 PR NO CHARGE LOS: CPT | Performed by: FAMILY MEDICINE

## 2024-09-27 NOTE — PROGRESS NOTES
Patient called.  Does not want to make any adjustments to meds and feels like most of his symptoms are due to stress of needing to find a home with relationship separation.  Does not have any headache or chest pains and recommended he get to the emergency department if those develop in any significant way.  Suggested increasing clonidine to 0.2 mg twice daily but he does not want to make any changes.  He plans to follow-up with the pharmacy in about 3 to 4 weeks.

## 2024-09-27 NOTE — PROGRESS NOTES
"Estevan Aaron was evaluated at [facility] on September 27, 2024 at which time his blood pressure was:    BP Readings from Last 1 Encounters:   09/27/24 (!) 171/91     No data recorded      Reviewed lifestyle modifications for blood pressure control and reduction: including making healthy food choices, managing weight, getting regular exercise, smoking cessation, reducing alcohol consumption, monitoring blood pressure regularly.     Symptoms: None    BP Goal:Other: 130/80    BP Assessment:  BP too high    Potential Reasons for BP too high: Unknown/Other: Pt had cigarrettes this morning and brought his large cup of coffee into the appointment.     BP Follow-Up Plan: Recheck BP in 30 days in the pharmacy.Date of next scheduled BP visit or call: 10/16    Recommendation to Provider: Pt is living in a hotel, smoking, drinking high amounts of caffeine, and is in the process of finding new housing (\"very high stress times\") he reports.  Denies any symptoms.  Pt does not want to have any medication changes at this time and was reluctant to schedule the next appointment.  I encouraged him to watch the factors he can control and we will continue to keep appointments    Note completed by: Laila Steel [facility]    "

## 2024-10-10 ENCOUNTER — HOSPITAL ENCOUNTER (EMERGENCY)
Facility: CLINIC | Age: 64
Discharge: PSYCHIATRIC OR SUBSTANCE USE FACILITY | End: 2024-10-12
Attending: EMERGENCY MEDICINE | Admitting: EMERGENCY MEDICINE
Payer: COMMERCIAL

## 2024-10-10 DIAGNOSIS — F23 ACUTE PSYCHOSIS (H): ICD-10-CM

## 2024-10-10 LAB
ALBUMIN SERPL BCG-MCNC: 4.1 G/DL (ref 3.5–5.2)
ALP SERPL-CCNC: 124 U/L (ref 40–150)
ALT SERPL W P-5'-P-CCNC: 26 U/L (ref 0–70)
ANION GAP SERPL CALCULATED.3IONS-SCNC: 13 MMOL/L (ref 7–15)
AST SERPL W P-5'-P-CCNC: 35 U/L (ref 0–45)
BASOPHILS # BLD AUTO: 0.1 10E3/UL (ref 0–0.2)
BASOPHILS NFR BLD AUTO: 1 %
BILIRUB SERPL-MCNC: 0.5 MG/DL
BUN SERPL-MCNC: 19.2 MG/DL (ref 8–23)
CA-I BLD-MCNC: 5.8 MG/DL (ref 4.4–5.2)
CALCIUM SERPL-MCNC: 10.9 MG/DL (ref 8.8–10.4)
CHLORIDE SERPL-SCNC: 102 MMOL/L (ref 98–107)
CREAT SERPL-MCNC: 1.05 MG/DL (ref 0.67–1.17)
EGFRCR SERPLBLD CKD-EPI 2021: 79 ML/MIN/1.73M2
EOSINOPHIL # BLD AUTO: 0.2 10E3/UL (ref 0–0.7)
EOSINOPHIL NFR BLD AUTO: 2 %
ERYTHROCYTE [DISTWIDTH] IN BLOOD BY AUTOMATED COUNT: 18.9 % (ref 10–15)
ETHANOL SERPL-MCNC: <0.01 G/DL
GLUCOSE SERPL-MCNC: 124 MG/DL (ref 70–99)
HCO3 SERPL-SCNC: 22 MMOL/L (ref 22–29)
HCT VFR BLD AUTO: 46.4 % (ref 40–53)
HGB BLD-MCNC: 14.2 G/DL (ref 13.3–17.7)
HOLD SPECIMEN: NORMAL
HOLD SPECIMEN: NORMAL
IMM GRANULOCYTES # BLD: 0.1 10E3/UL
IMM GRANULOCYTES NFR BLD: 1 %
LYMPHOCYTES # BLD AUTO: 2.5 10E3/UL (ref 0.8–5.3)
LYMPHOCYTES NFR BLD AUTO: 26 %
MAGNESIUM SERPL-MCNC: 2.1 MG/DL (ref 1.7–2.3)
MCH RBC QN AUTO: 19.7 PG (ref 26.5–33)
MCHC RBC AUTO-ENTMCNC: 30.6 G/DL (ref 31.5–36.5)
MCV RBC AUTO: 64 FL (ref 78–100)
MONOCYTES # BLD AUTO: 0.9 10E3/UL (ref 0–1.3)
MONOCYTES NFR BLD AUTO: 9 %
NEUTROPHILS # BLD AUTO: 6.1 10E3/UL (ref 1.6–8.3)
NEUTROPHILS NFR BLD AUTO: 61 %
NRBC # BLD AUTO: 0 10E3/UL
NRBC BLD AUTO-RTO: 0 /100
PHOSPHATE SERPL-MCNC: 2.3 MG/DL (ref 2.5–4.5)
PLAT MORPH BLD: NORMAL
PLATELET # BLD AUTO: 247 10E3/UL (ref 150–450)
POTASSIUM SERPL-SCNC: 4.2 MMOL/L (ref 3.4–5.3)
PROT SERPL-MCNC: 6.5 G/DL (ref 6.4–8.3)
RBC # BLD AUTO: 7.21 10E6/UL (ref 4.4–5.9)
RBC MORPH BLD: NORMAL
SODIUM SERPL-SCNC: 137 MMOL/L (ref 135–145)
TSH SERPL DL<=0.005 MIU/L-ACNC: 2.99 UIU/ML (ref 0.3–4.2)
WBC # BLD AUTO: 9.9 10E3/UL (ref 4–11)

## 2024-10-10 PROCEDURE — 99291 CRITICAL CARE FIRST HOUR: CPT | Mod: 25

## 2024-10-10 PROCEDURE — 83735 ASSAY OF MAGNESIUM: CPT | Performed by: EMERGENCY MEDICINE

## 2024-10-10 PROCEDURE — 258N000003 HC RX IP 258 OP 636: Performed by: EMERGENCY MEDICINE

## 2024-10-10 PROCEDURE — 80053 COMPREHEN METABOLIC PANEL: CPT | Performed by: EMERGENCY MEDICINE

## 2024-10-10 PROCEDURE — 96360 HYDRATION IV INFUSION INIT: CPT | Mod: 59

## 2024-10-10 PROCEDURE — 82330 ASSAY OF CALCIUM: CPT | Performed by: EMERGENCY MEDICINE

## 2024-10-10 PROCEDURE — 82077 ASSAY SPEC XCP UR&BREATH IA: CPT | Performed by: EMERGENCY MEDICINE

## 2024-10-10 PROCEDURE — 84443 ASSAY THYROID STIM HORMONE: CPT | Performed by: EMERGENCY MEDICINE

## 2024-10-10 PROCEDURE — 93005 ELECTROCARDIOGRAM TRACING: CPT | Mod: RTG

## 2024-10-10 PROCEDURE — 36415 COLL VENOUS BLD VENIPUNCTURE: CPT | Performed by: EMERGENCY MEDICINE

## 2024-10-10 PROCEDURE — 85025 COMPLETE CBC W/AUTO DIFF WBC: CPT | Performed by: EMERGENCY MEDICINE

## 2024-10-10 PROCEDURE — 84100 ASSAY OF PHOSPHORUS: CPT | Performed by: EMERGENCY MEDICINE

## 2024-10-10 RX ADMIN — SODIUM CHLORIDE 1000 ML: 9 INJECTION, SOLUTION INTRAVENOUS at 23:01

## 2024-10-10 ASSESSMENT — ACTIVITIES OF DAILY LIVING (ADL)
ADLS_ACUITY_SCORE: 35
ADLS_ACUITY_SCORE: 35

## 2024-10-11 ENCOUNTER — TELEPHONE (OUTPATIENT)
Dept: BEHAVIORAL HEALTH | Facility: CLINIC | Age: 64
End: 2024-10-11
Payer: COMMERCIAL

## 2024-10-11 PROBLEM — F29 PSYCHOSIS, UNSPECIFIED PSYCHOSIS TYPE (H): Status: ACTIVE | Noted: 2024-10-11

## 2024-10-11 LAB
AMPHETAMINES UR QL SCN: NORMAL
ATRIAL RATE - MUSE: 69 BPM
BARBITURATES UR QL SCN: NORMAL
BENZODIAZ UR QL SCN: NORMAL
BZE UR QL SCN: NORMAL
CANNABINOIDS UR QL SCN: NORMAL
DIASTOLIC BLOOD PRESSURE - MUSE: NORMAL MMHG
FENTANYL UR QL: NORMAL
INTERPRETATION ECG - MUSE: NORMAL
OPIATES UR QL SCN: NORMAL
P AXIS - MUSE: 50 DEGREES
PCP QUAL URINE (ROCHE): NORMAL
PR INTERVAL - MUSE: 176 MS
QRS DURATION - MUSE: 92 MS
QT - MUSE: 364 MS
QTC - MUSE: 390 MS
R AXIS - MUSE: -10 DEGREES
SYSTOLIC BLOOD PRESSURE - MUSE: NORMAL MMHG
T AXIS - MUSE: 61 DEGREES
VENTRICULAR RATE- MUSE: 69 BPM

## 2024-10-11 PROCEDURE — 250N000011 HC RX IP 250 OP 636: Mod: JZ | Performed by: EMERGENCY MEDICINE

## 2024-10-11 PROCEDURE — 80307 DRUG TEST PRSMV CHEM ANLYZR: CPT | Performed by: EMERGENCY MEDICINE

## 2024-10-11 PROCEDURE — 250N000013 HC RX MED GY IP 250 OP 250 PS 637: Performed by: EMERGENCY MEDICINE

## 2024-10-11 PROCEDURE — 96372 THER/PROPH/DIAG INJ SC/IM: CPT | Performed by: EMERGENCY MEDICINE

## 2024-10-11 RX ORDER — LISINOPRIL 10 MG/1
40 TABLET ORAL DAILY
Status: DISCONTINUED | OUTPATIENT
Start: 2024-10-11 | End: 2024-10-12 | Stop reason: HOSPADM

## 2024-10-11 RX ORDER — AMLODIPINE BESYLATE 5 MG/1
10 TABLET ORAL DAILY
Status: DISCONTINUED | OUTPATIENT
Start: 2024-10-11 | End: 2024-10-12 | Stop reason: HOSPADM

## 2024-10-11 RX ORDER — FUROSEMIDE 40 MG/1
40 TABLET ORAL DAILY
Status: DISCONTINUED | OUTPATIENT
Start: 2024-10-11 | End: 2024-10-12 | Stop reason: HOSPADM

## 2024-10-11 RX ORDER — OLANZAPINE 5 MG/1
10 TABLET, ORALLY DISINTEGRATING ORAL
Status: COMPLETED | OUTPATIENT
Start: 2024-10-11 | End: 2024-10-11

## 2024-10-11 RX ORDER — NICOTINE 21 MG/24HR
1 PATCH, TRANSDERMAL 24 HOURS TRANSDERMAL DAILY
Status: DISCONTINUED | OUTPATIENT
Start: 2024-10-11 | End: 2024-10-12 | Stop reason: HOSPADM

## 2024-10-11 RX ORDER — CLONIDINE HYDROCHLORIDE 0.1 MG/1
0.1 TABLET ORAL 2 TIMES DAILY
Status: DISCONTINUED | OUTPATIENT
Start: 2024-10-11 | End: 2024-10-12 | Stop reason: HOSPADM

## 2024-10-11 RX ORDER — OLANZAPINE 10 MG/2ML
10 INJECTION, POWDER, FOR SOLUTION INTRAMUSCULAR
Status: COMPLETED | OUTPATIENT
Start: 2024-10-11 | End: 2024-10-11

## 2024-10-11 RX ORDER — METOPROLOL SUCCINATE 50 MG/1
50 TABLET, EXTENDED RELEASE ORAL DAILY
Status: DISCONTINUED | OUTPATIENT
Start: 2024-10-11 | End: 2024-10-12 | Stop reason: HOSPADM

## 2024-10-11 RX ADMIN — CLONIDINE HYDROCHLORIDE 0.1 MG: 0.1 TABLET ORAL at 19:41

## 2024-10-11 RX ADMIN — METOPROLOL SUCCINATE 50 MG: 50 TABLET, EXTENDED RELEASE ORAL at 10:30

## 2024-10-11 RX ADMIN — CLONIDINE HYDROCHLORIDE 0.1 MG: 0.1 TABLET ORAL at 10:13

## 2024-10-11 RX ADMIN — AMLODIPINE BESYLATE 10 MG: 5 TABLET ORAL at 10:11

## 2024-10-11 RX ADMIN — OLANZAPINE 10 MG: 5 TABLET, ORALLY DISINTEGRATING ORAL at 08:29

## 2024-10-11 RX ADMIN — OLANZAPINE 10 MG: 10 INJECTION, POWDER, FOR SOLUTION INTRAMUSCULAR at 21:35

## 2024-10-11 RX ADMIN — NICOTINE 1 PATCH: 21 PATCH, EXTENDED RELEASE TRANSDERMAL at 10:29

## 2024-10-11 RX ADMIN — LISINOPRIL 40 MG: 10 TABLET ORAL at 10:11

## 2024-10-11 RX ADMIN — FUROSEMIDE 40 MG: 40 TABLET ORAL at 10:12

## 2024-10-11 RX ADMIN — FLUOXETINE HYDROCHLORIDE 40 MG: 20 CAPSULE ORAL at 10:11

## 2024-10-11 NOTE — ED PROVIDER NOTES
Patient medically cleared for mental health admission.   Pt currently declining urine drug screen. This is not required for medical clearance.      Troy Hensley MD  10/11/24 [id number]

## 2024-10-11 NOTE — PROGRESS NOTES
IP MH Referral Acuity Rating Score (RARS)    LMHP complete at referral to IP MH, with DEC; and, daily while awaiting IP MH placement. Call score to PPS.  CRITERIA SCORING   New 71 HH and Involuntary for IP MH (not adolescent) 0/1   Boarding over 24 hours 0/1   Vulnerable adult at least 55+ with multiple co morbidities; or, Patient age 11 or under 1/1   Suicide ideation without relief of precipitating factors 0/1   Current plan for suicide 0/1   Current plan for homicide 1/1   Imminent risk or actual attempt to seriously harm another without relief of factors precipitating the attempt 1/1   Severe dysfunction in daily living (ex: complete neglect for self care, extreme disruption in vegetative function, extreme deterioration in social interactions) 1/1   Recent (last 2 weeks) or current physical aggression in the ED 0/1   Restraints or seclusion episode in ED 0/1   Verbal aggression, agitation, yelling, etc., while in the ED 0/1   Active psychosis with psychomotor agitation or catatonia 0/1   Need for constant or near constant redirection (from leaving, from others, etc).  0/1   Intrusive or disruptive behaviors 0/1   TOTAL Acuity Total Score: 4
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
## 2024-10-11 NOTE — CONSULTS
Diagnostic Evaluation Consultation  Crisis Assessment    Patient Name: Estevan Aaron  Age:  64 year old  Legal Sex: male  Gender Identity: male  Pronouns: he/him   Race: White  Ethnicity: Choose not to answer  Language: English    Patient was assessed: Virtual: Unight   Crisis Assessment Start Date: 10/11/24  Crisis Assessment Start Time: 0903  Crisis Assessment Stop Time: 0937  Patient location: [facility] EMERGENCY DEPT                             ED07    Referral Data and Chief Complaint  Estevan Aaron presents to the ED via police. Patient is presenting to the ED for the following concerns:  (Psychosis).       Factors that make the mental health crisis life threatening or complex are:  The patient reports that since selling his house two months ago, a gang of people have been  after me,  as the result of reported scam between his realtor and a third party. He has been living in hotels for the past two months. The gang has been following in  mobs  down the highway and finding him wherever he goes. Last night, he saw the gang outside with guns and then heard them in the hallway. He left his hotel room, went to the lobby, called police and told them people were looking for him. Police did not find anyone outside. The patient did not have any guns or weapons on his person but stated he had loaded guns in his car and in his hotel room. Police brought patient to [facility] ED last night, 10/10/2024 around 2130.       Patient spent the night in the ED was interviewed on 10/11/2024 at 0902. Currently the patient is alert, oriented to person and place, engaged in interview, but is having trouble providing a history (some thought blocking and tangential thinking) and has impaired insight. The patient does not think this could be a delusion or hallucinations even though police did not see the people he saw. The patient reports having 40 guns because  we are a hunting family.  He states he has loaded guns in his  hotel room and in his car. When asked directly if has homicidal ideation he reports  no  but also states he needs them to  protect myself  against the people following him. He denies suicidal ideation, intent, or plan. He denies history of suicide attempts. He denies history of drug or alcohol use. Drug screen has not yet been collected. [place] [address] records shows no history of violence, commitment, or guardianship.       Attempted to safety plan but patient was not able or willing to engage. He was adamant that we can not call a collateral contact and was not willing to have someone hold his guns until he feels safer.  He made several arguments about his right to own guns and would like to be discharged. Explained that because we can t contact collateral and understand his history and risk better and allow his family to help safety plan, he has impaired insight and judgement, he appears to have delusions and hallucinations, he still feels he is being threatened and unsafe, and he won t consider letting someone hold his guns, it doesn t appear safe for him to be discharged at this time. Patient not in agreement. MD Zita, signed 72 hour hold at 09 on 10/11/2024.    Informed Consent and Assessment Methods  Explained the crisis assessment process, including applicable information disclosures and limits to confidentiality, assessed understanding of the process, and obtained consent to proceed with the assessment.  Assessment methods included conducting a formal interview with patient, review of medical records, collaboration with medical staff, and obtaining relevant collateral information from family and community providers when available: done     Patient response to interventions: acceptance expressed  Coping skills were attempted to reduce the crisis:  Unknown     History of the Crisis   The patient denies any history of mental illness or mental health admissions. EPIC shows no history of mental illness or  admissions. Patient has no outpatient providers and takes no medicines for mental health.    Brief Psychosocial History  Family:   ( two months ago), Children yes (adult children)  Support System:     Employment Status:   (self employeed )  Source of Income:  salary/wages  Financial Environmental Concerns:  none  Current Hobbies:   (unknown)  Barriers in Personal Life:   (unknown)    Significant Clinical History  Current Anxiety Symptoms:  anxious  Current Depression/Trauma:   (none reported)  Current Somatic Symptoms:  anxious  Current Psychosis/Thought Disturbance:  visual hallucinations, auditory hallucinations  Current Eating Symptoms:   (none reported)  Chemical Use History:  Alcohol:  (denies)  Benzodiazepines:  (denies)  Opiates:  (denies)  Cocaine:  (denies)  Marijuana:  (denies)  Other Use:  (denies)  Withdrawal Symptoms:  (denies)  Addictions:  (denies)   Past diagnosis:  No known past diagnosis  Family history:   (unknown)  Past treatment:  No known formal treatment attempts     Collateral Information  Is there collateral information: No (Patient declined to allow collateral to be contacted.)      Risk Assessment  [place] Suicide Severity Rating Scale Full Clinical Version: 10/11/2024  Suicidal Ideation  Q1 Wish to be Dead (Lifetime): No  Q2 Non-Specific Active Suicidal Thoughts (Lifetime): No     Suicidal Behavior (Lifetime)  Actual Attempt (Lifetime): No    [place] Suicide Severity Rating Scale Recent: 10/11/2024  Suicidal Ideation (Recent)  Q1 Wished to be Dead (Past Month): no  Q2 Suicidal Thoughts (Past Month): no  Level of Risk per Screen: no risks indicated     Suicidal Behavior (Recent)  Actual Attempt (Past 3 Months): No    Environmental or Psychosocial Events:  (believes people are after him)  Protective Factors: Protective Factors:  (limited)    Does the patient have thoughts of harming others? Feels Like Hurting Others: no  Previous Attempt to Hurt Others: no  Is the patient  engaging in sexually inappropriate behavior?: no    Is the patient engaging in sexually inappropriate behavior?  no        Mental Status Exam   Affect: Labile  Appearance: Disheveled  Attention Span/Concentration: Attentive  Eye Contact: Engaged    Fund of Knowledge: Appropriate   Language /Speech Content: Fluent  Language /Speech Volume: Normal  Language /Speech Rate/Productions: Normal  Recent Memory: Poor  Remote Memory: Poor  Mood: Normal, Anxious  Orientation to Person: Yes   Orientation to Place: Yes  Orientation to Time of Day: Yes  Orientation to Date: Yes     Situation (Do they understand why they are here?): No  Psychomotor Behavior: Normal  Thought Content: Hallucinations, Delusions  Thought Form: Tangential     Medication  Psychotropic medications:   Medication Orders - Psychiatric (From admission, onward)      Start     Dose/Rate Route Frequency Ordered Stop    10/11/24 1025  nicotine (NICODERM CQ) 21 MG/24HR 24 hr patch 1 patch         1 patch  over 24 Hours Transdermal DAILY 10/11/24 1020      10/11/24 0955  FLUoxetine (PROzac) capsule 40 mg         40 mg Oral DAILY 10/11/24 0950      10/11/24 0534  OLANZapine (zyPREXA) injection 10 mg         10 mg Intramuscular ONCE PRN 10/11/24 0534          Current Care Team  Patient Care Team:  Estevan Blair MD as PCP - General (Family Practice)  Estevan Blair MD as Assigned PCP    Diagnosis  Patient Active Problem List   Diagnosis Code    Hyperlipidemia LDL goal <130 E78.5    Hypertension goal BP (blood pressure) < 130/80 I10    Hypercalcemia E83.52    Hyperparathyroidism (H) E21.3    Thalassemia, unspecified type D56.9    Psychosis, unspecified psychosis type (H) F29     Primary Problem This Admission  Active Hospital Problems    *F29 Psychosis, unspecified psychosis type     Clinical Summary and Substantiation of Recommendations   The patient reports that since selling his house two months ago, a gang of people have been  after me,  as the result of  reported scam between his realtor and a third party. He has been living in hotels for the past two months. The gang has been following in  mobs  down the highway and finding him wherever he goes. Last night, he saw the gang outside with guns and then heard them in the hallway but police did not find any people. The patient states he has guns he is not willing to have someone hold because he needs them for his  safety  against the people following him.  Attempted to safety plan but patient was not able or willing to engage. He was adamant that we can not call a collateral contact and was not willing to have someone hold his guns until he feels safer.  He made several arguments about his right to own guns and would like to be discharged. Explained that because we can t contact collateral and understand his history and risk better and allow his family to help safety plan, he has impaired insight and judgement, he appears to have delusions and hallucinations, he still feels he is being threatened and unsafe, and he won t consider letting someone hold his guns, it doesn t appear safe for him to be discharged at this time. Patient not in agreement. MD Zita, signed 72 hour hold at 0949 on 10/11/2024.    Patient coping skills attempted to reduce the crisis:  Unknown    Disposition  Recommended disposition: Inpatient Mental Health        Reviewed case and recommendations with attending provider. Attending Name: MD Zita       Attending concurs with disposition: yes       Patient and/or validated legal guardian concurs with disposition: no     Final disposition:  inpatient mental health    Legal status on admission:  72 hour hold     Called intake and patient was placed on waiting list for inpatient mental health.     Assessment Details   Total duration spent with the patient: 34 min     CPT code(s) utilized: 47578 - Psychotherapy for Crisis - 60 (30-74*) min    JESSICA Bazzi   DEC - Triage & Transition Services    Callback: [phone]

## 2024-10-11 NOTE — PHARMACY-ADMISSION MEDICATION HISTORY
Pharmacist Admission Medication History    Admission medication history is complete. The information provided in this note is only as accurate as the sources available at the time of the update.    Information Source(s): Patient via in-person    Pertinent Information: Pt had pictures of med bottles on phone.    Changes made to PTA medication list:  Added: None  Deleted: None  Changed: None    Allergies reviewed with patient and updates made in EHR: yes    Medication History Completed By: Jeannie Dempsey [facility] 10/11/2024 8:25 AM    PTA Med List   Medication Sig Last Dose    amLODIPine (NORVASC) 10 MG tablet Take 1 tablet (10 mg) by mouth daily 10/10/2024 at am    atorvastatin (LIPITOR) 40 MG tablet Take 1 tablet (40 mg) by mouth daily 10/10/2024 at am    cloNIDine (CATAPRES) 0.1 MG tablet Take 1 tablet (0.1 mg) by mouth 2 times daily 10/10/2024 at am    FLUoxetine (PROZAC) 40 MG capsule Take 1 capsule (40 mg) by mouth daily 10/10/2024 at am    furosemide (LASIX) 20 MG tablet Take 2 tablets (40 mg) by mouth daily 10/10/2024 at am    lisinopril (ZESTRIL) 40 MG tablet Take 1 tablet (40 mg) by mouth daily 10/10/2024 at am    metoprolol succinate ER (TOPROL XL) 50 MG 24 hr tablet Take 1 tablet (50 mg) by mouth daily 10/10/2024 at am

## 2024-10-11 NOTE — PLAN OF CARE
Estevan PRABHAKAR Page  October 11, 2024  Plan of Care Hand-off Note     Patient Care Path: inpatient mental health    Plan for Care:   The patient reports that since selling his house two months ago, a gang of people have been  after me,  as the result of reported scam between his realtor and a third party. He has been living in hotels for the past two months. The gang has been following in  mobs  down the highway and finding him wherever he goes. Last night, he saw the gang outside with guns and then heard them in the hallway but police did not find any people. The patient states he has loaded guns he is not willing to have someone hold because he needs them for his  safety  against the people following him.      Attempted to safety plan but patient was not able or willing to engage. He was adamant that we can not call a collateral contact and was not willing to have someone hold his guns until he feels safer.  He made several arguments about his right to own guns and would like to be discharged. Explained that because we can t contact collateral and understand his history and risk better and allow his family to help safety plan, he has impaired insight and judgement, he appears to have delusions and hallucinations, he still feels he is being threatened and unsafe, and he won t consider letting someone hold his guns, it doesn t appear safe for him to be discharged at this time. Patient not in agreement. MD Zita, signed 72 hour hold at 0949 on 10/11/2024.    Called intake and patient placed on waiting list.     Legal Status:  72 hour hold    Psychiatry Consult: MD can order if needed     Updated MD regarding plan of care.         JESSICA Bazzi

## 2024-10-11 NOTE — ED PROVIDER NOTES
"  Emergency Department Note      History of Present Illness     Chief Complaint   Mental Health Problem      HPI   Estevan Aaron is a 64 year old male with a history of hyperlipidemia and hypertension who presents to the emergency department via EMS for evaluation of a mental health problem. The patient reports that he is feeling fine and the  misunderstood him and sent him here. He states that he was \"almost shot through the door by rebels\" because he \"knows too much about realtors\". He denies any alcohol or drug use.    Per police report, the patient was at [facility] and called 911 because he said \"people were looking for him\". They state that when the arrived nobody was outside the room. They report that the patient said he had a loaded gun in the hotel room and police found a loaded gun in the room. They report that the patient was searched before arrival with no weapons on his persons. Patient is on a transport hold. They report that patient stated he would shoot anyone who came into his room. They state that he is a danger to others.    Independent Historian   EMS as detailed above.    Review of External Notes       Past Medical History     Medical History and Problem List   Kidney stone  Tobacco use disorder  Hyperlipidemia  Hypertension  Hyperparathyroidism  Thalassemia    Medications   Norvasc  Lipitor  Catapres  Prozac  Lasix  Zestril  Toprol    Surgical History   Rotator cuff repair    Physical Exam     Patient Vitals for the past 24 hrs:   BP Temp Temp src Pulse Resp SpO2 Height   10/10/24 2132 (!) 158/85 98.6  F (37  C) Oral 71 18 95 % 1.676 m (5' 6\")     Physical Exam  Vitals and nursing note reviewed.   HENT:      Head: Normocephalic.   Cardiovascular:      Rate and Rhythm: Normal rate.   Pulmonary:      Effort: Pulmonary effort is normal.   Abdominal:      General: Abdomen is flat. Bowel sounds are normal.   Musculoskeletal:         General: Normal range of motion.   Skin:     General: Skin is " warm.      Capillary Refill: Capillary refill takes less than 2 seconds.   Neurological:      General: No focal deficit present.      Mental Status: He is alert and oriented to person, place, and time.           Diagnostics     Lab Results   Labs Ordered and Resulted from Time of ED Arrival to Time of ED Departure   COMPREHENSIVE METABOLIC PANEL - Abnormal       Result Value    Sodium 137      Potassium 4.2      Carbon Dioxide (CO2) 22      Anion Gap 13      Urea Nitrogen 19.2      Creatinine 1.05      GFR Estimate 79      Calcium 10.9 (*)     Chloride 102      Glucose 124 (*)     Alkaline Phosphatase 124      AST 35      ALT 26      Protein Total 6.5      Albumin 4.1      Bilirubin Total 0.5     PHOSPHORUS - Abnormal    Phosphorus 2.3 (*)    IONIZED CALCIUM - Abnormal    Calcium Ionized Whole Blood 5.8 (*)    CBC WITH PLATELETS AND DIFFERENTIAL - Abnormal    WBC Count 9.9      RBC Count 7.21 (*)     Hemoglobin 14.2      Hematocrit 46.4      MCV 64 (*)     MCH 19.7 (*)     MCHC 30.6 (*)     RDW 18.9 (*)     Platelet Count 247      % Neutrophils 61      % Lymphocytes 26      % Monocytes 9      % Eosinophils 2      % Basophils 1      % Immature Granulocytes 1      NRBCs per 100 WBC 0      Absolute Neutrophils 6.1      Absolute Lymphocytes 2.5      Absolute Monocytes 0.9      Absolute Eosinophils 0.2      Absolute Basophils 0.1      Absolute Immature Granulocytes 0.1      Absolute NRBCs 0.0     TSH WITH FREE T4 REFLEX - Normal    TSH 2.99     ETHYL ALCOHOL LEVEL - Normal    Alcohol ethyl <0.01     MAGNESIUM - Normal    Magnesium 2.1     URINE DRUG SCREEN PANEL - Normal    Amphetamines Urine Screen Negative      Barbituates Urine Screen Negative      Benzodiazepine Urine Screen Negative      Cannabinoids Urine Screen Negative      Cocaine Urine Screen Negative      Fentanyl Qual Urine Screen Negative      Opiates Urine Screen Negative      PCP Urine Screen Negative     RBC AND PLATELET MORPHOLOGY    RBC Morphology  Confirmed RBC Indices      Platelet Assessment        Value: Automated Count Confirmed. Platelet morphology is normal.       Imaging   No orders to display         Independent Interpretation   None    ED Course      Medications Administered   Medications - No data to display    Procedures   Procedures     Discussion of Management   ED Mental Health,      ED Course   ED Course as of 10/10/24 2153   Thu Oct 10, 2024   2141 I initially assessed the patient and obtained the above history and physical exam.         Additional Documentation  None    Medical Decision Making / Diagnosis     CMS Diagnoses: None    MIPS       None    MDM   Estevan Aaron is a 64 year old male presents with agitated behavior and concerns for homicidal ideation patient has access to guns.  Is quite manic.  Is difficult to follow his conversation line.  Patient is placed on a hold by police and this was maintained in the emergency room.  Due to delays in mental health care patient was signed out to the oncoming physician patient would likely require placement and admission due to his manic state and access to weapons in his threatening behavior.    Disposition   Care of the patient was transferred to my colleague Dr. Schofield pending dec ASSESSMENT  .     Diagnosis     ICD-10-CM    1. Acute psychosis (H)  F23            Discharge Medications   New Prescriptions    No medications on file         Scribe Disclosure:  I, Lydia Osman, am serving as a scribe at 10:01 PM on 10/10/2024 to document services personally performed by Brown Roberts MD based on my observations and the provider's statements to me.        Brown Roberts MD  10/24/24 0009

## 2024-10-11 NOTE — PROGRESS NOTES
DEBORAH received a [facility] Crisis workerMarilyn [phone]. She reported that the [place] police found a loaded gun and 12 boxes of ammunition in patients hotel room.     Information given to DEC worker.    Kaitlynn Morton, [facility] DEBORAH   Inpatient Care Coordination   Supervisor  [facility]  [phone]

## 2024-10-11 NOTE — ED NOTES
RN ED Mental Health Handoff Note    72 hour hold    Does patient require 1:1? No    Hold and rights been given and documented for patient: Yes    Is the patient in BH scrubs? Yes    Has the patient been searched? Yes    Is the 15 minute observation tool up to date? Yes    Was patient issued a welcome folder? Yes    Room check completed this shift: Yes    PSS3 and [place] Assessment/Reassessment this shift:    C-SSRS ([place])      Date and Time Q1 Wished to be Dead (Past Month) Q2 Suicidal Thoughts (Past Month) Q3 Suicidal Thought Method Q4 Suicidal Intent without Specific Plan Q5 Suicide Intent with Specific Plan Q6 Suicide Behavior (Lifetime) If yes to Q6, within past 3 months? Level of Risk per Screen Level of Risk per Screen User   10/11/24 1017 0-->no 0-->no -- -- -- -- -- -- no risks indicated JH            Behavioral status of patient: Green    Code 21 called this shift? No    Use of restraints/seclusion this shift? No    Most recent vital signs:  Temp: 98.6  F (37  C) Temp src: Oral BP: (!) 157/98 Pulse: 69   Resp: 18 SpO2: 100 % O2 Device: None (Room air)      Medications:  Scheduled medication compliance? Yes    PRN Meds administered this shift? No    Medications   OLANZapine (zyPREXA) injection 10 mg (has no administration in time range)   amLODIPine (NORVASC) tablet 10 mg (10 mg Oral $Given 10/11/24 1011)   cloNIDine (CATAPRES) tablet 0.1 mg (0.1 mg Oral $Given 10/11/24 1013)   FLUoxetine (PROzac) capsule 40 mg (40 mg Oral $Given 10/11/24 1011)   furosemide (LASIX) tablet 40 mg (40 mg Oral $Given 10/11/24 1012)   lisinopril (ZESTRIL) tablet 40 mg (40 mg Oral $Given 10/11/24 1011)   metoprolol succinate ER (TOPROL XL) 24 hr tablet 50 mg (50 mg Oral $Given 10/11/24 1030)   nicotine (NICODERM CQ) 21 MG/24HR 24 hr patch 1 patch (1 patch Transdermal $Patch/Med Applied 10/11/24 1029)   sodium chloride 0.9% BOLUS 1,000 mL (0 mLs Intravenous Stopped 10/11/24 0025)   OLANZapine zydis (zyPREXA) ODT tab 10 mg (10  mg Oral $Given 10/11/24 0879)         ADLs    Meal Provided this shift? Yes    Hygiene items provided? No    ADLs completed? No    Date of last shower: PTA    Any significant events this shift? No    Any information that would be helpful in caring for this patient?  N/A    Family present/updated? No    Location of patient's belongings: DEC office    Critical Care Minutes:  Does the patient need critical care minutes documented? No

## 2024-10-11 NOTE — ED NOTES
"Pt became agitative when waking up, keep saying \"you have no right to keep me here\", \"I'm not going to stay\" \"you are not going to put me through any psych place\", \"I'm a professional tom and you all doing this to me\", \"what happened to the bad guys out there trying to shoot me\". Writer,  and Dr. Esparza explained to pt the process and SURYA multiple time, and frequent reinforcement required. Pt demonstrated no evident of learning. Pt declined food     Pt's belongings were collected and placed in DEC office. Pt still has his phone and eyeglasses   "

## 2024-10-11 NOTE — ED NOTES
RN ED Mental Health Handoff Note    SURYA    Does patient require 1:1? Yes    Hold and rights been given and documented for patient: Yes    Is the patient in  scrubs? Yes    Has the patient been searched? Yes - pt was searched by PD and his belongings were locked in DEC office     Is the 15 minute observation tool up to date? Yes    Was patient issued a welcome folder? Yes    Room check completed this shift: Yes    PSS3 and [place] Assessment/Reassessment this shift:        Behavioral status of patient: Yellow. Threaten to leave     Code 21 called this shift? No    Use of restraints/seclusion this shift? No    Most recent vital signs:  Temp: 98.6  F (37  C) Temp src: Oral BP: (!) 185/70 Pulse: 70   Resp: 18 SpO2: 94 % O2 Device: None (Room air)      Medications:  Scheduled medication compliance? No    PRN Meds administered this shift? No    Medications   OLANZapine (zyPREXA) injection 10 mg (has no administration in time range)   sodium chloride 0.9% BOLUS 1,000 mL (0 mLs Intravenous Stopped 10/11/24 0025)         ADLs    Meal Provided this shift? Yes - Pt declined     Hygiene items provided? No    ADLs completed? No    Date of last shower: PTA    Any significant events this shift? Yes. Details: verbally agitative and threaten to leave when woke up     Any information that would be helpful in caring for this patient?    NA     Family present/updated? No    Location of patient's belongings: DEC office, 3 bags     Critical Care Minutes:  Does the patient need critical care minutes documented? No

## 2024-10-11 NOTE — TELEPHONE ENCOUNTER
"S: [facility] ED , DEC  Leila  calling at 9:50 am about a 64 year old/Male presenting last night around 9:30 pm appearing to be delusional.     B: Pt arrived via Police. Presenting problem, stressors:   he appears delusional. He called 911 from a hotel last night saying people were in the [place] and parking lot trying to \"get him.\" He was referencing his guns in his car and hotel room that he could use to protect himself. Police did not find anyone and then brought him in. Today he says people follow him in mobs on the highway and says he has his gun to protect himself with. No hx of psychosis or mh issues. He refuses to allow  to call his family. He refuses to safety plan.     Pt affect in ED: Labile angry at times, happy other times  Pt Dx: Unspecified Psychosis  Previous [facility] hx? no  Pt denies SI   Hx of suicide attempt? No  Pt denies SIB  Pt     says he is not going to hurt anyone \"unless someone is chasing me\"; he references having guns to protect himself with   Pt denies hallucinations .  Pt RARS Score: 4    Hx of aggression/violence, sexual offenses, legal concerns, Epic care plan? describe: no  Current concerns for aggression this visit? No  Does pt have a history of Civil Commitment? No  Is Pt their own guardian? Yes    Pt is not prescribed medication. Is patient medication compliant? N/A  Pt denies OP services   CD concerns: None he denies  Acute or chronic medical concerns: none  Does Pt present with specific needs, assistive devices, or exclusionary criteria? None      Pt is ambulatory  Pt is able to perform ADLs independently      A: Pt to be reviewed for [facility] admission. Pt is on a 72HH, initiated 10/11/24 at 0949  Preferred placement: Statewide    COVID Symptoms: No  If yes, COVID test required   Utox: Not ordered, intake to request lab  called ED at 10:04 am and asked Cipriano to order this  CMP: Abnormalities: calcium 10.9, glucose 124  CBC: Abnormalities: RBC count 7.21, MCV 64, MCH 19.7, MCHC " 30.6, RDW 18.9  HCG: N/A    R: Patient cleared and ready for behavioral bed placement: Yes  Pt placed on IPMH worklist? Yes    Does Patient need a Transfer Center request created? Yes, writer completed Transfer Center request at:  10:01 am    Paged Joanne at 11:46 am  Per Joanne at 12 pm, she just learned unit !0 is now case by case.   Per Dayana at 12:05 pm, #10 is now case by case.  Paged Joanne at 12:06 pm and said that since unit is case by case, author will have unit CRN review pt.   Called #10 CRN Mable at 12:08 pm and asked her to review pt, as unit is currently case by case.   Per Mable at 12:47 pm, she has not yet had a chance to review pt but said she will call back after she does.   Passed to lesli. starr

## 2024-10-11 NOTE — ED NOTES
Pt woke up to use the restroom. Was also incontinent in the bed and on the floor. Bed changed and given a pair of scrubs to change into.

## 2024-10-11 NOTE — ED PROVIDER NOTES
Care for this patient after signout from my colleague.  Plan at time of signout was to follow-up with DEC after their assessment.  DEC informed me that they recommend inpatient admission given that he reports having guns at his disposal and he seems very paranoid and in acute psychosis.  He is on a 72-hour hold and currently awaiting bed placement at time of signout to my oncoming colleague.    Nic Ahumada MD on 10/11/2024 at 4:31 PM       Nic Ahumada MD  10/11/24 [id number]

## 2024-10-11 NOTE — ED NOTES
RN gave pt all his home medications in a med cup. He is sleepy, and went to grab it, then dropped his pills on the floor. Pt wanted RN to pick them up and he wanted to take them. RN picked pills up. Pt took some, however when he moved rooms there was still some pills under his chair. Unsure what full doses he got. Only 1 pill was found under his chair. All the others he took.

## 2024-10-12 ENCOUNTER — APPOINTMENT (OUTPATIENT)
Dept: CT IMAGING | Facility: CLINIC | Age: 64
End: 2024-10-12
Attending: EMERGENCY MEDICINE
Payer: COMMERCIAL

## 2024-10-12 ENCOUNTER — HOSPITAL ENCOUNTER (INPATIENT)
Facility: CLINIC | Age: 64
End: 2024-10-12
Attending: PSYCHIATRY & NEUROLOGY | Admitting: PSYCHIATRY & NEUROLOGY
Payer: COMMERCIAL

## 2024-10-12 ENCOUNTER — TELEPHONE (OUTPATIENT)
Dept: BEHAVIORAL HEALTH | Facility: CLINIC | Age: 64
End: 2024-10-12

## 2024-10-12 VITALS
BODY MASS INDEX: 37.12 KG/M2 | TEMPERATURE: 98.1 F | DIASTOLIC BLOOD PRESSURE: 103 MMHG | OXYGEN SATURATION: 97 % | SYSTOLIC BLOOD PRESSURE: 191 MMHG | RESPIRATION RATE: 20 BRPM | HEART RATE: 62 BPM | HEIGHT: 66 IN

## 2024-10-12 DIAGNOSIS — I10 HYPERTENSION GOAL BP (BLOOD PRESSURE) < 130/80: ICD-10-CM

## 2024-10-12 DIAGNOSIS — F29 PSYCHOSIS, UNSPECIFIED PSYCHOSIS TYPE (H): ICD-10-CM

## 2024-10-12 DIAGNOSIS — F23 ACUTE PSYCHOSIS (H): Primary | ICD-10-CM

## 2024-10-12 DIAGNOSIS — E83.52 HYPERCALCEMIA: ICD-10-CM

## 2024-10-12 DIAGNOSIS — E78.5 HYPERLIPIDEMIA LDL GOAL <100: ICD-10-CM

## 2024-10-12 PROCEDURE — 250N000013 HC RX MED GY IP 250 OP 250 PS 637: Performed by: EMERGENCY MEDICINE

## 2024-10-12 PROCEDURE — 99207 PR NO BILLABLE SERVICE THIS VISIT: CPT | Performed by: STUDENT IN AN ORGANIZED HEALTH CARE EDUCATION/TRAINING PROGRAM

## 2024-10-12 PROCEDURE — 124N000002 HC R&B MH UMMC

## 2024-10-12 PROCEDURE — 70450 CT HEAD/BRAIN W/O DYE: CPT

## 2024-10-12 PROCEDURE — 250N000013 HC RX MED GY IP 250 OP 250 PS 637

## 2024-10-12 PROCEDURE — 99223 1ST HOSP IP/OBS HIGH 75: CPT | Mod: AI | Performed by: PSYCHIATRY & NEUROLOGY

## 2024-10-12 RX ORDER — GABAPENTIN 100 MG/1
100 CAPSULE ORAL EVERY 6 HOURS PRN
Status: DISPENSED | OUTPATIENT
Start: 2024-10-12

## 2024-10-12 RX ORDER — METOPROLOL SUCCINATE 50 MG/1
50 TABLET, EXTENDED RELEASE ORAL DAILY
Status: DISPENSED | OUTPATIENT
Start: 2024-10-12

## 2024-10-12 RX ORDER — LISINOPRIL 10 MG/1
40 TABLET ORAL DAILY
Status: DISPENSED | OUTPATIENT
Start: 2024-10-12

## 2024-10-12 RX ORDER — ACETAMINOPHEN 325 MG/1
650 TABLET ORAL EVERY 4 HOURS PRN
Status: DISPENSED | OUTPATIENT
Start: 2024-10-12

## 2024-10-12 RX ORDER — OLANZAPINE 10 MG/2ML
10 INJECTION, POWDER, FOR SOLUTION INTRAMUSCULAR
Status: COMPLETED | OUTPATIENT
Start: 2024-10-12 | End: 2024-10-12

## 2024-10-12 RX ORDER — OLANZAPINE 5 MG/1
5 TABLET ORAL 3 TIMES DAILY PRN
Status: DISCONTINUED | OUTPATIENT
Start: 2024-10-12 | End: 2024-10-13

## 2024-10-12 RX ORDER — POLYETHYLENE GLYCOL 3350 17 G/17G
17 POWDER, FOR SOLUTION ORAL DAILY PRN
Status: ACTIVE | OUTPATIENT
Start: 2024-10-12

## 2024-10-12 RX ORDER — OLANZAPINE 10 MG/1
10 TABLET, ORALLY DISINTEGRATING ORAL AT BEDTIME
Status: DISCONTINUED | OUTPATIENT
Start: 2024-10-12 | End: 2024-10-14

## 2024-10-12 RX ORDER — AMLODIPINE BESYLATE 5 MG/1
10 TABLET ORAL DAILY
Status: DISPENSED | OUTPATIENT
Start: 2024-10-12

## 2024-10-12 RX ORDER — FUROSEMIDE 40 MG/1
40 TABLET ORAL DAILY
Status: DISPENSED | OUTPATIENT
Start: 2024-10-12

## 2024-10-12 RX ORDER — CLONIDINE HYDROCHLORIDE 0.1 MG/1
0.1 TABLET ORAL 2 TIMES DAILY
Status: DISPENSED | OUTPATIENT
Start: 2024-10-12

## 2024-10-12 RX ORDER — ATORVASTATIN CALCIUM 20 MG/1
40 TABLET, FILM COATED ORAL DAILY
Status: DISPENSED | OUTPATIENT
Start: 2024-10-12

## 2024-10-12 RX ORDER — OLANZAPINE 10 MG/2ML
10 INJECTION, POWDER, FOR SOLUTION INTRAMUSCULAR ONCE
Status: COMPLETED | OUTPATIENT
Start: 2024-10-12 | End: 2024-10-12

## 2024-10-12 RX ORDER — MAGNESIUM HYDROXIDE/ALUMINUM HYDROXICE/SIMETHICONE 120; 1200; 1200 MG/30ML; MG/30ML; MG/30ML
30 SUSPENSION ORAL EVERY 4 HOURS PRN
Status: DISPENSED | OUTPATIENT
Start: 2024-10-12

## 2024-10-12 RX ORDER — OLANZAPINE 10 MG/2ML
5 INJECTION, POWDER, FOR SOLUTION INTRAMUSCULAR 3 TIMES DAILY PRN
Status: DISCONTINUED | OUTPATIENT
Start: 2024-10-12 | End: 2024-10-13

## 2024-10-12 RX ADMIN — AMLODIPINE BESYLATE 10 MG: 5 TABLET ORAL at 09:07

## 2024-10-12 RX ADMIN — NICOTINE 1 PATCH: 21 PATCH, EXTENDED RELEASE TRANSDERMAL at 09:05

## 2024-10-12 RX ADMIN — FLUOXETINE HYDROCHLORIDE 40 MG: 20 CAPSULE ORAL at 09:09

## 2024-10-12 RX ADMIN — METOPROLOL SUCCINATE 50 MG: 50 TABLET, EXTENDED RELEASE ORAL at 09:18

## 2024-10-12 RX ADMIN — FUROSEMIDE 40 MG: 40 TABLET ORAL at 09:09

## 2024-10-12 RX ADMIN — CLONIDINE HYDROCHLORIDE 0.1 MG: 0.1 TABLET ORAL at 09:09

## 2024-10-12 RX ADMIN — CLONIDINE HYDROCHLORIDE 0.1 MG: 0.1 TABLET ORAL at 19:31

## 2024-10-12 RX ADMIN — LISINOPRIL 40 MG: 10 TABLET ORAL at 09:10

## 2024-10-12 ASSESSMENT — ACTIVITIES OF DAILY LIVING (ADL)
ADLS_ACUITY_SCORE: 35
WALKING_OR_CLIMBING_STAIRS_DIFFICULTY: NO
ADLS_ACUITY_SCORE: 28
ADLS_ACUITY_SCORE: 35
ADLS_ACUITY_SCORE: 28
ADLS_ACUITY_SCORE: 35
ADLS_ACUITY_SCORE: 35
ADLS_ACUITY_SCORE: 28
WEAR_GLASSES_OR_BLIND: NO
TOILETING_ISSUES: NO
ADLS_ACUITY_SCORE: 28
ADLS_ACUITY_SCORE: 28
DIFFICULTY_EATING/SWALLOWING: NO
ORAL_HYGIENE: INDEPENDENT
ADLS_ACUITY_SCORE: 28
ADLS_ACUITY_SCORE: 35
DIFFICULTY_COMMUNICATING: NO
DRESS: INDEPENDENT;SCRUBS (BEHAVIORAL HEALTH)
ADLS_ACUITY_SCORE: 35
ADLS_ACUITY_SCORE: 28
ADLS_ACUITY_SCORE: 28
ADLS_ACUITY_SCORE: 35
HEARING_DIFFICULTY_OR_DEAF: NO
ADLS_ACUITY_SCORE: 35
CONCENTRATING,_REMEMBERING_OR_MAKING_DECISIONS_DIFFICULTY: NO
ADLS_ACUITY_SCORE: 28
ADLS_ACUITY_SCORE: 45
HYGIENE/GROOMING: INDEPENDENT
ADLS_ACUITY_SCORE: 28
DOING_ERRANDS_INDEPENDENTLY_DIFFICULTY: NO
ADLS_ACUITY_SCORE: 35
ADLS_ACUITY_SCORE: 28
DRESSING/BATHING_DIFFICULTY: NO
FALL_HISTORY_WITHIN_LAST_SIX_MONTHS: NO
CHANGE_IN_FUNCTIONAL_STATUS_SINCE_ONSET_OF_CURRENT_ILLNESS/INJURY: NO
ADLS_ACUITY_SCORE: 35
ADLS_ACUITY_SCORE: 28
HYGIENE/GROOMING: INDEPENDENT

## 2024-10-12 NOTE — PROGRESS NOTES
"Pt admitted to station 20 via [facility] ED on a 72 hour hold .  Pt cooperative with sear and vitals being taken,  Pt given tour of unit.  Pt appears very confused and disorganized.  During admit interview pt focused on talking about the sale of this house and incident last night at the hotel he was staying at.  This staff unable to follow what he was saying.  Some of things he was saying didn't make sense.  He was also having trouble recalling words and names.  Pt did acknowledge having been under a lot of stress recently.  Pt states has 3 daughters. 1 daughter lives in [place].  Pt states this daughter wants him to stay with her.  Pt denies SI, denies thoughts of wanting to hurt others.  Pt ate 100% of lunch.  Pt at first wanted to take a shower. Pt appears he hasn't take a shower. But later he didn't want to.  Pt got very upset that he can't have all his belongings, with them.  Pt has demanded that he have all his belongings.  Pt attempted to get behind the desk.   Staff tried to redirect him which worked for a minute but then would go to back to being angry. Pt offered PRN Zyprexa or Gabapentin.  Pt delcined them stating \"no drugs.\"  Pt states this is his 1st psych hosp. P t reports hx of CD tx about 10 years ago in [facility].  PT stats he does not drink or do drugs.  [place] [facility] [place]  P  "

## 2024-10-12 NOTE — PROVIDER NOTIFICATION
10/12/24 1322   Patient Belongings   Did you bring any home meds/supplements to the hospital?  No   Patient Belongings locker;sent to security per site process   Patient Belongings Put in Hospital Secure Location (Security or Locker, etc.) body jewelry;cash/credit card;shoes;wallet;keys;glasses;clothing   Belongings Search Yes   Clothing Search Yes   Second Staff riya         1 pair of galsses, 1 belt, 1 gold chain, 1 ring says LEXIE, 1 green lighter, 1 vikings hat, 1 pair of new balance tennis shoes, 1 pair of aury shorts, 1 gray shirt, 1 brown wallet, 1 insurance card, 1 AAA card, 1 [place] ID card, 1 state of [place] ID, auto insurance card,   Addendum 1 phone  Addendum 10/17/2024: Shirt (4), Pants (4), Package of Underwear, Jacket  2 sets of keys were given to family member    Envelope sent to security #[id number]  923$ in [employer], Zayda gift cards x3, [employer] cards #0011 x2 cards, [employer] Visa #3361, Capital on #8857, Capital one Spark Business #4730, Visa Citi #3697, Discover #9693, [place] [employer] Debit #6847, Visa Zayda, #5494, Home depot #1558, [place] bank #3170, [employer] farm Visa #3046    ..A               Admission:  I am responsible for any personal items that are not sent to the safe or pharmacy.  [facility] is not responsible for loss, theft or damage of any property in my possession.    Signature:  _________________________________ Date: _______  Time: _____                                              Staff Signature:  ____________________________ Date: ________  Time: _____      2nd Staff person, if patient is unable/unwilling to sign:    Signature: ________________________________ Date: ________  Time: _____     Discharge:  [facility] has returned all of my personal belongings:    Signature: _________________________________ Date: ________  Time: _____                                          Staff Signature:  ____________________________ Date: ________  Time: _____              Signature:  ____________________________ Date: ________  Time: _____

## 2024-10-12 NOTE — ED NOTES
RN ED Mental Health Handoff Note    72 hour hold    Does patient require 1:1? Yes    Hold and rights been given and documented for patient: Yes    Is the patient in BH scrubs? yes    Has the patient been searched? Yes    Is the 15 minute observation tool up to date? Yes    Was patient issued a welcome folder? No -    Room check completed this shift: Yes    PSS3 and [place] Assessment/Reassessment this shift:    C-SSRS ([place])      Date and Time Q1 Wished to be Dead (Past Month) Q2 Suicidal Thoughts (Past Month) Q3 Suicidal Thought Method Q4 Suicidal Intent without Specific Plan Q5 Suicide Intent with Specific Plan Q6 Suicide Behavior (Lifetime) If yes to Q6, within past 3 months? Level of Risk per Screen Level of Risk per Screen User   10/11/24 2140 0-->no 0-->no -- -- -- 0-->no -- -- no risks indicated MRM   10/11/24 1017 0-->no 0-->no -- -- -- -- -- -- no risks indicated JH            Behavioral status of patient: Red.      Code 21 called this shift? Yes    Use of restraints/seclusion this shift? Yes. Details: pt required restraints during the night    Most recent vital signs:      BP: (!) 175/99 Pulse: 69   Resp: 18 SpO2: 96 %        Medications:  Scheduled medication compliance? Yes    PRN Meds administered this shift? Yes    Medications   amLODIPine (NORVASC) tablet 10 mg (10 mg Oral $Given 10/11/24 1011)   cloNIDine (CATAPRES) tablet 0.1 mg (0.1 mg Oral $Given 10/11/24 1941)   FLUoxetine (PROzac) capsule 40 mg (40 mg Oral $Given 10/11/24 1011)   furosemide (LASIX) tablet 40 mg (40 mg Oral $Given 10/11/24 1012)   lisinopril (ZESTRIL) tablet 40 mg (40 mg Oral $Given 10/11/24 1011)   metoprolol succinate ER (TOPROL XL) 24 hr tablet 50 mg (50 mg Oral $Given 10/11/24 1030)   nicotine (NICODERM CQ) 21 MG/24HR 24 hr patch 1 patch (1 patch Transdermal $Patch/Med Applied 10/11/24 1029)   sodium chloride 0.9% BOLUS 1,000 mL (0 mLs Intravenous Stopped 10/11/24 0025)   OLANZapine (zyPREXA) injection 10 mg (10 mg  Intramuscular $Given 10/11/24 0306)   OLANZapine zydis (zyPREXA) ODT tab 10 mg (10 mg Oral $Given 10/11/24 0685)   OLANZapine (zyPREXA) injection 10 mg (0 mg Intramuscular Return to Cabinet 10/12/24 0045)         ADLs    Meal Provided this shift? No-snacks    Hygiene items provided? Yes    ADLs completed? No    Date of last shower: unknown    Any significant events this shift? Yes. Details: pt became agitated and wanted to leave. Required restraints.    Any information that would be helpful in caring for this patient?      Family present/updated? No--pt refuses to allow any family contact    Location of patient's belongings: DEC office    Critical Care Minutes:  Does the patient need critical care minutes documented? No

## 2024-10-12 NOTE — ED NOTES
"Pt came out of room stating \"I am going to leave I have stuff to do\". RN explained to pt that he is on a hold and is going inpatient for mental health. Pt states \"no I am leaving\". RN tried to deescalate pt and get him back in room. Security also tried to deescalate pt. Pt refused to go back in room. Code 21 called.  "

## 2024-10-12 NOTE — ED NOTES
Patient initially refusing to go with ALS for transfer to [place], verbal de escalation was used to settle patient who eventually got onto ALS stretcher independently. Care explained throughout process and reassurance given to patient. Snack and drink provided.

## 2024-10-12 NOTE — TELEPHONE ENCOUNTER
R: Iron / Sarah    7:18 Pt accepted by on call provider Dr. Caceres    7:27 PM Unit notified.     7:28 PM Indicia Completed
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
## 2024-10-12 NOTE — PLAN OF CARE
"BP (!) 155/75   Pulse 83   Temp 97.7  F (36.5  C) (Oral)   Resp 16   Wt 105.7 kg (233 lb)   SpO2 97%   BMI 37.61 kg/m    Iso:  No active isolations  Diet: Regular Diet Adult  Mental Status:       Problem: Adult Behavioral Health Plan of Care  Goal: Patient-Specific Goal (Individualization)  Description: You can add care plan individualizations to a care plan. Examples of Individualization might be:  \"Parent requests to be called daily at 9am for status\", \"I have a hard time hearing out of my right ear\", or \"Do not touch me to wake me up as it startles  me\".  Outcome: Progressing   Goal Outcome Evaluation:      Plan of Care Reviewed With: patient           RN Assessment:  SI/Self harm:  Denied.  Aggression/agitation/HI:  Denied  AVH:  Denied  Sleep:   PRN Med: No PRNs administered this shift  Medication : Compliant   Physical Complaints/Issues:  I & O: eating and drinking well  LBM:   ADLs: independent  Visits:   Vitals: BP was 158/45,rechecked and was 134/75  COVID 19 Assessment:    Milieu Participation: Visible in the milieu  Behavior: Impulsive ,irritable and Constantly pacing the hallway and lingering next to the exits . House keeping staff opened the exit door and tried collect grab the rubbish bin from the dining room ,pt took the opportunity and tried to elope. Pt was redirected back to the nursing station .  Pt does not understand why he is in the unit. Very intrusive and need a a lot of redirection . Pt denied having pain and discomfort . Pt endorsed anxiety and depression but refused interventions .Denied all MH symptoms. Pt refused to have his labs drawn. Provider rescheduled labs for tomorrow.Continue with POC.    "

## 2024-10-12 NOTE — ED NOTES
RN ED Mental Health Handoff Note    72 hour hold    Does patient require 1:1? Yes    Hold and rights been given and documented for patient: Yes    Is the patient in BH scrubs? Yes    Has the patient been searched? Yes    Is the 15 minute observation tool up to date? Yes    Was patient issued a welcome folder? Yes    Room check completed this shift: Yes    PSS3 and [place] Assessment/Reassessment this shift:    C-SSRS ([place])      Date and Time Q1 Wished to be Dead (Past Month) Q2 Suicidal Thoughts (Past Month) Q3 Suicidal Thought Method Q4 Suicidal Intent without Specific Plan Q5 Suicide Intent with Specific Plan Q6 Suicide Behavior (Lifetime) If yes to Q6, within past 3 months? Level of Risk per Screen Level of Risk per Screen User   10/12/24 0932 0-->no 0-->no -- -- -- 0-->no -- -- no risks indicated WH   10/11/24 2140 0-->no 0-->no -- -- -- 0-->no -- -- no risks indicated MRM   10/11/24 1017 0-->no 0-->no -- -- -- -- -- -- no risks indicated             Behavioral status of patient: Yellow. Verbal de escalation     Code 21 called this shift? No    Use of restraints/seclusion this shift? No    Most recent vital signs:  Temp: 98.1  F (36.7  C) Temp src: Oral BP: (!) 191/103 Pulse: 62   Resp: 20 SpO2: 97 % O2 Device: None (Room air)      Medications:  Scheduled medication compliance? Yes    PRN Meds administered this shift? No    Medications   amLODIPine (NORVASC) tablet 10 mg (10 mg Oral $Given 10/12/24 0907)   cloNIDine (CATAPRES) tablet 0.1 mg (0.1 mg Oral $Given 10/12/24 0909)   FLUoxetine (PROzac) capsule 40 mg (40 mg Oral $Given 10/12/24 0909)   furosemide (LASIX) tablet 40 mg (40 mg Oral $Given 10/12/24 0909)   lisinopril (ZESTRIL) tablet 40 mg (40 mg Oral $Given 10/12/24 0910)   metoprolol succinate ER (TOPROL XL) 24 hr tablet 50 mg (50 mg Oral $Given 10/12/24 0918)   nicotine (NICODERM CQ) 21 MG/24HR 24 hr patch 1 patch (1 patch Transdermal Patch/Med Removed 10/12/24 0910)   sodium chloride 0.9% BOLUS  1,000 mL (0 mLs Intravenous Stopped 10/11/24 0025)   OLANZapine (zyPREXA) injection 10 mg (10 mg Intramuscular $Given 10/11/24 2135)   OLANZapine zydis (zyPREXA) ODT tab 10 mg (10 mg Oral $Given 10/11/24 0829)   OLANZapine (zyPREXA) injection 10 mg (0 mg Intramuscular Return to Cabinet 10/12/24 0045)         ADLs    Meal Provided this shift? Yes    Hygiene items provided? Yes    ADLs completed? No    Date of last shower: NA    Any significant events this shift? Patient transferred to [place] via ALS at 1018    Any information that would be helpful in caring for this patient?  NA    Family present/updated? No, patient refuses for family to be contacted.     Location of patient's belongings: given to ALS staff to transfer with patient to [place].     Critical Care Minutes:  Does the patient need critical care minutes documented? Yes

## 2024-10-12 NOTE — ED PROVIDER NOTES
I received patient in signout from Dr. Perez.  Please refer to their complete H&P for further information.  Briefly, patient presented for mental health eval. Patient required IM zyprexa and restraints, at time of signout reeval pending. Has been evaluated by DEC who recommended inpatient placement given concern for new onset psychosis.     Patient taken out of restraints on my shift. Formal head CT ordered as well given concern for new onset psychosis. To be followed by Dr. Perez. Remains on an SURYA.            Jewell Alegria,   10/12/24 0616

## 2024-10-12 NOTE — ED NOTES
RN called and gave report to [place] [facility]. LESLY Stephens, is talking with on call supervisor regarding pt being in restraints. [place] RN to call back.

## 2024-10-12 NOTE — CONSULTS
Brief Hospital Progress Note      Assessment and plan:  Estevan Aaron is a 64 year old male with a known history of hyperparathyroidism and hypertension.  Patient currently admitted to station 20 for mental health evaluation in context of psychosis.  Internal medicine consult was placed due to hypercalcemia in context of known hyperparathyroidism    # Hypercalcemia secondary to hyperparathyroidism  Discussed consult with psychiatry service via [employer].  Psychiatry team concerned that hypercalcemia could be contributing to current psychosis/change in mental status.  Per chart review, the patient has known hyperparathyroidism documented since at least November 2020 and has been advised to undergo parathyroidectomy for over 1 year.  Unfortunately, there are no simple, oral medication options to correct hypercalcemia to my knowledge.  Ultimately, definitive treatment for the patient's hypercalcemia is parathyroidectomy.  - Medicine team unfortunately has nothing to offer in this context apart from repeating PTH level (ordered)   - I advised endocrine consult for further evaluation and guidance on management.      Patient was not seen today. No charge. Not created to fulfill consult order.    Arjun Gallegos PA-C  Internal Medicine [facility]  Pager [phone]

## 2024-10-12 NOTE — TELEPHONE ENCOUNTER
11:58am - Resident Nicki called Behavioral Intake to ask questions about possible next steps when a pt's acuity rises when they are in unit queue. Intake notified Resident of unit calling ANS and speaking with them as to next steps, otherwise wait until restraints are concluded and re-evaluate pt's acuity after medication. Resident understood and will call unit to pass info along.     3:54am - INtake called [facility] about any updates or ETA for pt admission. Per CRN, they talked to the Resident and the ANS and they agreed to wait until next shift to re-evaluate/admit pt due to pt needing medication and restraints prior to transfer.     Due to pt's acuity change, admission will be pushed to next shift. Pt will remain in Station 20's queue.

## 2024-10-12 NOTE — ED NOTES
Pt out of room to restroom.  Had to be redirected to room after using the restroom.  Was then given snacks and fresh water.   Pt stating he needs an , making comments about guns, making nonsensical mumbling statements and asking for breakfast.

## 2024-10-12 NOTE — PHARMACY-ADMISSION MEDICATION HISTORY
Please see Admission Medication reconciliation note placed 10/11 for information regarding prior to admission medications.     Missy Prasad, PharmD  PGY-2 Behavioral Health Pharmacy Resident  [facility] ([facility])  Available on [employer] [place]

## 2024-10-12 NOTE — ED PROVIDER NOTES
Estevan Aaron was signed out to me by Dr. Alegria awaiting further psychiatric evaluation with concerns for new onset paranoia/psychosis.  He has no history in the past therefore CT head was obtained to exclude intracranial process.  He was signed out to me awaiting this imaging.  Fortunately this was normal.  He has no fever.  No focal neurologic deficit.  No concerning laboratory abnormality to explain his symptoms.  No indication for lumbar puncture or further emergent medical evaluation of aside from ongoing mental health assessment.  Using reasonable clinical judgment, medically cleared at this time for further evaluation.  Awaiting inpatient psychiatric bed availability.     Tricia Perez MD  10/12/24 5643

## 2024-10-12 NOTE — ED NOTES
"Transfer planned to [facility] today and care explained to patient. Patient states \"I will not go, there is nothing wrong with me\". Patient raising voice, demanding his phone and belongings and coffee. Explained that his belongings are safe and will not be returned to him at this time. Patient continues to state \"I'm getting out of here but no one is taking me somewhere else\". Patient then talking about \"people after him with guns at a hotel, pointing a gun at his head through the peek hole on the door\".   "

## 2024-10-12 NOTE — ED NOTES
RN went into pt room to assess coming out of restraints. RN educated pt on situation and why he is going inpatient. Pt rude and uncooperative. RN will continue to monitor pt.

## 2024-10-13 LAB
ALBUMIN SERPL BCG-MCNC: 4.6 G/DL (ref 3.5–5.2)
ALBUMIN UR-MCNC: NEGATIVE MG/DL
APPEARANCE UR: CLEAR
BILIRUB UR QL STRIP: NEGATIVE
CALCIUM SERPL-MCNC: 12.5 MG/DL (ref 8.8–10.4)
CHOLEST SERPL-MCNC: 210 MG/DL
COLOR UR AUTO: NORMAL
EST. AVERAGE GLUCOSE BLD GHB EST-MCNC: 123 MG/DL
FOLATE SERPL-MCNC: 14.8 NG/ML (ref 4.6–34.8)
GLUCOSE UR STRIP-MCNC: NEGATIVE MG/DL
HBA1C MFR BLD: 5.9 %
HDLC SERPL-MCNC: 30 MG/DL
HGB UR QL STRIP: NEGATIVE
KETONES UR STRIP-MCNC: NEGATIVE MG/DL
LDLC SERPL CALC-MCNC: 108 MG/DL
LEUKOCYTE ESTERASE UR QL STRIP: NEGATIVE
NITRATE UR QL: NEGATIVE
NONHDLC SERPL-MCNC: 180 MG/DL
PH UR STRIP: 6 [PH] (ref 5–7)
PTH-INTACT SERPL-MCNC: 85 PG/ML (ref 15–65)
RBC URINE: <1 /HPF
SP GR UR STRIP: 1.01 (ref 1–1.03)
T4 FREE SERPL-MCNC: 1.13 NG/DL (ref 0.9–1.7)
TRIGL SERPL-MCNC: 362 MG/DL
UROBILINOGEN UR STRIP-MCNC: NORMAL MG/DL
VIT B12 SERPL-MCNC: 493 PG/ML (ref 232–1245)
VIT D+METAB SERPL-MCNC: 18 NG/ML (ref 20–50)
WBC URINE: 1 /HPF

## 2024-10-13 PROCEDURE — 84439 ASSAY OF FREE THYROXINE: CPT

## 2024-10-13 PROCEDURE — 250N000013 HC RX MED GY IP 250 OP 250 PS 637

## 2024-10-13 PROCEDURE — 99253 IP/OBS CNSLTJ NEW/EST LOW 45: CPT | Performed by: INTERNAL MEDICINE

## 2024-10-13 PROCEDURE — 83970 ASSAY OF PARATHORMONE: CPT

## 2024-10-13 PROCEDURE — 81001 URINALYSIS AUTO W/SCOPE: CPT

## 2024-10-13 PROCEDURE — 82310 ASSAY OF CALCIUM: CPT

## 2024-10-13 PROCEDURE — 250N000011 HC RX IP 250 OP 636: Mod: JW

## 2024-10-13 PROCEDURE — 83036 HEMOGLOBIN GLYCOSYLATED A1C: CPT

## 2024-10-13 PROCEDURE — 80061 LIPID PANEL: CPT

## 2024-10-13 PROCEDURE — 82607 VITAMIN B-12: CPT

## 2024-10-13 PROCEDURE — 82746 ASSAY OF FOLIC ACID SERUM: CPT

## 2024-10-13 PROCEDURE — 82040 ASSAY OF SERUM ALBUMIN: CPT

## 2024-10-13 PROCEDURE — 82306 VITAMIN D 25 HYDROXY: CPT

## 2024-10-13 PROCEDURE — 250N000011 HC RX IP 250 OP 636

## 2024-10-13 PROCEDURE — 36415 COLL VENOUS BLD VENIPUNCTURE: CPT

## 2024-10-13 PROCEDURE — 124N000002 HC R&B MH UMMC

## 2024-10-13 RX ORDER — NICOTINE 21 MG/24HR
1 PATCH, TRANSDERMAL 24 HOURS TRANSDERMAL DAILY PRN
Status: DISCONTINUED | OUTPATIENT
Start: 2024-10-13 | End: 2024-12-09

## 2024-10-13 RX ORDER — OLANZAPINE 10 MG/2ML
5 INJECTION, POWDER, FOR SOLUTION INTRAMUSCULAR 4 TIMES DAILY PRN
Status: DISCONTINUED | OUTPATIENT
Start: 2024-10-13 | End: 2024-10-15

## 2024-10-13 RX ORDER — CINACALCET 30 MG/1
30 TABLET, FILM COATED ORAL 2 TIMES DAILY
Status: DISCONTINUED | OUTPATIENT
Start: 2024-10-13 | End: 2024-10-16

## 2024-10-13 RX ORDER — OLANZAPINE 5 MG/1
5 TABLET ORAL 4 TIMES DAILY PRN
Status: DISCONTINUED | OUTPATIENT
Start: 2024-10-13 | End: 2024-10-15

## 2024-10-13 RX ADMIN — OLANZAPINE 5 MG: 10 INJECTION, POWDER, FOR SOLUTION INTRAMUSCULAR at 10:01

## 2024-10-13 RX ADMIN — LISINOPRIL 40 MG: 10 TABLET ORAL at 08:09

## 2024-10-13 RX ADMIN — AMLODIPINE BESYLATE 10 MG: 5 TABLET ORAL at 08:08

## 2024-10-13 RX ADMIN — OLANZAPINE 5 MG: 10 INJECTION, POWDER, FOR SOLUTION INTRAMUSCULAR at 17:02

## 2024-10-13 RX ADMIN — OLANZAPINE 10 MG: 10 TABLET, ORALLY DISINTEGRATING ORAL at 20:42

## 2024-10-13 RX ADMIN — CLONIDINE HYDROCHLORIDE 0.1 MG: 0.1 TABLET ORAL at 08:08

## 2024-10-13 RX ADMIN — METOPROLOL SUCCINATE 50 MG: 50 TABLET, EXTENDED RELEASE ORAL at 08:09

## 2024-10-13 RX ADMIN — NICOTINE 1 PATCH: 21 PATCH, EXTENDED RELEASE TRANSDERMAL at 16:11

## 2024-10-13 RX ADMIN — CINACALCET 30 MG: 30 TABLET ORAL at 20:18

## 2024-10-13 RX ADMIN — ATORVASTATIN CALCIUM 40 MG: 20 TABLET, FILM COATED ORAL at 08:09

## 2024-10-13 RX ADMIN — CLONIDINE HYDROCHLORIDE 0.1 MG: 0.1 TABLET ORAL at 20:18

## 2024-10-13 RX ADMIN — FUROSEMIDE 40 MG: 40 TABLET ORAL at 08:09

## 2024-10-13 RX ADMIN — CINACALCET 30 MG: 30 TABLET ORAL at 11:00

## 2024-10-13 RX ADMIN — OLANZAPINE 5 MG: 5 TABLET, FILM COATED ORAL at 13:48

## 2024-10-13 ASSESSMENT — ACTIVITIES OF DAILY LIVING (ADL)
ADLS_ACUITY_SCORE: 28
LAUNDRY: UNABLE TO COMPLETE
ADLS_ACUITY_SCORE: 28
ADLS_ACUITY_SCORE: 28
ORAL_HYGIENE: INDEPENDENT
ADLS_ACUITY_SCORE: 28
ADLS_ACUITY_SCORE: 28
HYGIENE/GROOMING: INDEPENDENT
ADLS_ACUITY_SCORE: 28
ADLS_ACUITY_SCORE: 28
LAUNDRY: UNABLE TO COMPLETE
ORAL_HYGIENE: INDEPENDENT
ADLS_ACUITY_SCORE: 28
ADLS_ACUITY_SCORE: 28
DRESS: INDEPENDENT;SCRUBS (BEHAVIORAL HEALTH)
ADLS_ACUITY_SCORE: 28
HYGIENE/GROOMING: INDEPENDENT
ADLS_ACUITY_SCORE: 28
DRESS: INDEPENDENT;SCRUBS (BEHAVIORAL HEALTH)

## 2024-10-13 NOTE — PROGRESS NOTES
Pt as morning progressed pt getting more and more agitated.  Pt getting more focused on discharge. Pt demanding to leave.  Pt telling staff to open the door for him.  Staff repeatedly telling pt unable to do this.  Many staff tried to redirect and distract him onto something else. Pt lingering at front doors.   Pt continues to focus on leaving.  Pt standing extremely close to staff. Pt redirected to back up.  Pt did for a second or so then again got extremely close to staffs face. Pt then threatened to break things and break the door down if he was not let off the unit. Pt offered po PRN Zyprexa.  Pt threw the pill across room.  Staff did physical hold and IM Zyprexa 5mg administered.  Pt was escorted to his room.  Explained to pt that he needs to stay in his room for 15 minutes.  Pt did comply with staying in his room fir that time.

## 2024-10-13 NOTE — PLAN OF CARE
"  Problem: Sleep Disturbance  Goal: Adequate Sleep/Rest  Outcome: Not Progressing   Goal Outcome Evaluation:    Patient slept a total of 0 hours.     Awake in lounge at start of shift and remained awake until the end of the shift, continuously asking his SIO attendees to let him out of the unit. Refused to go to his room all shift despite encouragements from staff to get rest. Staff offered him PRN gabapentin, PRN melatonin, and PRN Zyprexa but patient refused all of them and continued to perseverate on needing to leave. \"Get my phone first, my gun, and let me out!\" Patient's thought process is disorganized and he would often make paranoid statements (\"It wasn't my fault, get that in your mind. I didn't tell him that I was gonna beat him up, the other marina did...\").     At some point patient appeared to be hallucinating (e.g. talking to a person that's not present or talking to and petting a dog, etc). Patient spent much of the night walking around and trying to open all the doors and fidgeting with badge readers and light switches in hopes of getting out (asking staff, \"Come on, get me outta here!\"). Eventually redirectable from these behaviors and able to remain calm most of the night. Safety/environment checks conducted every 15 minutes with no further concerns noted. No complaints of pain/discomfort.          "

## 2024-10-13 NOTE — CARE PLAN
10/13/24 1740   Seclusion or Restraint Order Upon Initiation and With Every Order   In Person Face to Face Assessment Conducted Yes-Eval of pt's immediate situation, reaction to intervention, complete review of systems assessment, behavioral assessment & review/assessment of hx, drugs & meds, recent labs, etc, behavioral condition, need to continue/terminate restraint/seclusion  (Writer offered snack and water, pt declined. Pt refused to answer assesment questions, No bodily injury was observed. The pt contracted for safety and the physician was notified.)   RN Notified Provider of Result of Face to Face Yes   Describe adverse physical outcome N/A   Describe follow-up needed none

## 2024-10-13 NOTE — PLAN OF CARE
"After pt rec'd IM Zyprexa pt calmed down.  Pt was more oriented.  He got numbers out of his phone. Pt ate lunch.  At about 2 pm pt getting agitated demanding to be discharged.  Pt demanding his keys.  Pt thought his car was outside.  Explained to pt few times that he is in the hospital.  Pt did not seem to understand.  Pt talking about going hunting and to get his DNR license out of his belongings.   Again trying to explain to pt need D/C order.  Pt offered PRN Zyprexa 5mg PO which he did take.  Pt again demanding to leave.  Pt wanting his DNR license and credit credit.  Pt telling staff we've taken his cash.  Pt activity trying to leave the unit.  Again explained to pt many times by many people that his belongings are safe.  Multiple people are trying to redirect him watching the doors.   Pt continues to be disoriented about where he is.  Pt remains on 2:1 for severe intrusiveness and agressiveness.      Problem: Adult Behavioral Health Plan of Care  Goal: Plan of Care Review  Outcome: Not Progressing  Goal: Patient-Specific Goal (Individualization)  Description: You can add care plan individualizations to a care plan. Examples of Individualization might be:  \"Parent requests to be called daily at 9am for status\", \"I have a hard time hearing out of my right ear\", or \"Do not touch me to wake me up as it startles  me\".  Outcome: Not Progressing  Goal: Adheres to Safety Considerations for Self and Others  Outcome: Not Progressing  Intervention: Develop and Maintain Individualized Safety Plan  Recent Flowsheet Documentation  Taken 10/13/2024 1300 by Catherine Szymanski, RN  Safety Measures: safety rounds completed  Goal: Absence of New-Onset Illness or Injury  Outcome: Not Progressing  Intervention: Identify and Manage Fall Risk  Recent Flowsheet Documentation  Taken 10/13/2024 1300 by Catherine Szymanski, RN  Safety Measures: safety rounds completed  Goal: Optimized Coping Skills in Response to Life Stressors  Outcome: Not " Progressing  Goal: Develops/Participates in Therapeutic [place] to Support Successful Transition  Outcome: Not Progressing     Problem: Psychotic Signs/Symptoms  Goal: Improved Behavioral Control (Psychotic Signs/Symptoms)  Outcome: Not Progressing  Goal: Optimal Cognitive Function (Psychotic Signs/Symptoms)  Outcome: Not Progressing  Goal: Increased Participation and Engagement (Psychotic Signs/Symptoms)  Outcome: Not Progressing  Goal: Improved Mood Symptoms (Psychotic Signs/Symptoms)  Outcome: Not Progressing  Goal: Improved Psychomotor Symptoms (Psychotic Signs/Symptoms)  Outcome: Not Progressing  Goal: Decreased Sensory Symptoms (Psychotic Signs/Symptoms)  Outcome: Not Progressing  Goal: Improved Sleep (Psychotic Signs/Symptoms)  Outcome: Not Progressing  Goal: Enhanced Social, Occupational or Functional Skills (Psychotic Signs/Symptoms)  Outcome: Not Progressing     Problem: Depression  Goal: Improved Mood  Outcome: Not Progressing     Problem: Suicidal Behavior  Goal: Suicidal Behavior is Absent or Managed  Outcome: Not Progressing     Problem: Anxiety  Goal: Anxiety Reduction or Resolution  Outcome: Not Progressing     Problem: Sleep Disturbance  Goal: Adequate Sleep/Rest  Outcome: Not Progressing     Problem: Seclusion/Restraint, Behavioral  Goal: Seclusion/Behavioral Restraint Goal: Absence of Harm or Injury  Outcome: Not Progressing  Intervention: Implement Least Restrictive Safety Strategies  Recent Flowsheet Documentation  Taken 10/13/2024 1300 by Catherine Szymanski RN  Safety Measures: safety rounds completed   Goal Outcome Evaluation:

## 2024-10-13 NOTE — CONSULTS
Endocrinology Consult        I was asked to see Estevan Aaron in consultation by Dr. Dagher for evaluation and treatment of hypercalcemia.    Assessment:     Primary hyperparathyroidism.  Longstanding mild to moderate hypercalcemia, with concurrent hyperparathyroidism and upper-normal 24-hour urine calcium excretion consistent with primary hyperparathyroidism.  Review of records in our EHR does not indicate he has had prior treatment with lithium.  Psychosis.  Although hypercalcemia can present with altered mental status or psychosis, not certain that this is the underlying etiology for this patient.  Typically, longstanding mild and moderate hypercalcemia (patient has been hypercalcemic since 2015) is generally tolerated.  Nonetheless, would recommend treating primary hyperparathyroidism since--in addition to mental status/mental health changes--the patient would be a candidate for treatment based on degree of hypercalcemia and history of kidney stones.  I am not certain that the patient has the ability to consent to immediate intervention such as surgery.  We have the option to try cinacalcet for calcium lowering at least for the short-term.  History of kidney stones.  History of vitamin D deficiency.  Noted in 4/2019.  Follow-up vitamin D level is pending.    Recommendations:  -Start cinacalcet (Sensipar) 30 mg twice daily  -I do not expect that patient will be able to cooperate with imaging studies or surgical consultation: Once psychiatrically improved/stable, recommend coordinating imaging studies and ENT surgery consult for consideration of parathyroidectomy (will likely need to do this outpatient)  -Recommend rechecking calcium tomorrow, 10/14/2024, along with albumin (would need to periodically monitor calcium while on cinacalcet)  -Maintain sufficient hydration  -Endocrinology service will continue to follow peripherally, please contact us with any questions      Chief Complaint: Hypercalcemia,  psychosis    History of Present Illness:        Mr. Aaron is a 64 year old patient who was hospitalized on the inpatient psychiatry service: He was admitted from the ER on 10/12/2024 due to concern for homicidal ideation and psychosis.    I visited the patient on inpatient psychiatry floor: He is agitated, eager to leave, and unable to provide any history.  He repeatedly declines interview.    Based on my review of his chart, the patient has had hypercalcemia dating back to 2015.  His primary care physician has completed a thorough workup: In the event that thiazide therapy was contributing to hypercalcemia, hydrochlorothiazide was discontinued in 2019.  Yet, hypercalcemia persisted.  He has had concurrently elevated PTH.  24-hour urine calcium excretion, as detailed below, was noted to be on 280 mg per 24 hours in 4/2019.    He has been referred to endocrinology twice but has not been able to complete this consultation.    His records indicate history of kidney stones.    No DXA scan on record.    No history of treatment with lithium based on review of medication records and progress notes.    Social History: Reviewed with patient, pertinent findings in HPI.        Past Medical History:  Past Medical History:   Diagnosis Date    Kidney stone     Serum calcium elevated     Tobacco use disorder     tobacco quit line referral done 4/19/06       Medications:  I reviewed inpatient and out medications in MAR and also reviewed outpatient regimen.  Pertinent outpatient and inpatient medications are noted in HPI.     Allergies:    No Known Allergies                Family history: Unable to elicit family history as above.    Review of Systems: Complete review of systems was attempted but review of systems cannot be completed due to agitation.    Physical Exam:  BP (!) 155/75   Pulse 83   Temp 97.7  F (36.5  C) (Oral)   Resp 16   Wt 105.7 kg (233 lb)   SpO2 96%   BMI 37.61 kg/m      GENERAL: Mild to moderate agitation,  eager to leave the enriquez.    SKIN: Visible skin clear. No significant rash, abnormal pigmentation or lesions.  EYES: Eyes grossly normal to inspection.  No discharge or erythema, or obvious scleral/conjunctival abnormalities.  NECK: No visible masses; no visible goiter.  RESP: No audible wheeze, cough, or visible cyanosis.  No visible retractions or increased work of breathing.    NEURO: Cranial nerves grossly intact.  Awake, alert.  PSYCH: Agitated, limited insight.      Laboratory and Radiology Results:               I spent a total of 50 minutes on the date of encounter reviewing medical records, evaluating the patient, coordinating care and documenting in the EHR, as well as performing any further activities detailed above.      Isa Vang MD   Division of Diabetes, Endocrinology and Metabolism  Department of Medicine

## 2024-10-13 NOTE — PLAN OF CARE
"  Problem: Anxiety  Goal: Anxiety Reduction or Resolution  Outcome: Progressing     Problem: Seclusion/Restraint, Behavioral  Goal: Seclusion/Behavioral Restraint Goal: Absence of Harm or Injury  Outcome: Progressing   Goal Outcome Evaluation:    Pt became agitated at the beginning of the shift.  [place] near exit  doors. started pacing the hallway, asking staff to open the door  because he wanted to get out of here. He was making threats to staff, aggressive towards staff stated\" I will kill you , I have guns,\" He was verbally abuse to staff, he kicked the exit door multiple times he also bang his shoulder against the  exit door and posturing making treats. Writer offer snacks pt declined. Pt refused assessment questions. No bodily injury was observed. Writer offered  oral PRN Zyprexa for agitation pt declined. He throw it on the floor and stepped on it. IM Zyprexa offered pt decline. The [facility] team  was called. They encourage pt to take the medication pt declined. Writer called the doctor  at 1637 and obtain orders for physical hold. IM Zyprexa was administered at 1640.  Face to face done and no apparent injury noted. Doctor notified. Pt calmed down after IM medication was administered and contracted for safety. The physical hold lasted for 3 minutes and it was discontinued.     Currently pt is on the hallway sitting calmly, he ate his dinner . Will continue to monitor.  Pt  started pacing the [place]  again at about 10:45 PM. Writer gave pt PRN Melatonin but pt refused. Will continue to monitor.      "

## 2024-10-14 LAB
ALBUMIN SERPL BCG-MCNC: 4.3 G/DL (ref 3.5–5.2)
CALCIUM SERPL-MCNC: 11.4 MG/DL (ref 8.8–10.4)
HOLD SPECIMEN: NORMAL

## 2024-10-14 PROCEDURE — 124N000002 HC R&B MH UMMC

## 2024-10-14 PROCEDURE — 99232 SBSQ HOSP IP/OBS MODERATE 35: CPT | Mod: GC | Performed by: PSYCHIATRY & NEUROLOGY

## 2024-10-14 PROCEDURE — 250N000013 HC RX MED GY IP 250 OP 250 PS 637

## 2024-10-14 PROCEDURE — 36415 COLL VENOUS BLD VENIPUNCTURE: CPT | Performed by: PSYCHIATRY & NEUROLOGY

## 2024-10-14 PROCEDURE — 82310 ASSAY OF CALCIUM: CPT | Performed by: PSYCHIATRY & NEUROLOGY

## 2024-10-14 PROCEDURE — 82040 ASSAY OF SERUM ALBUMIN: CPT | Performed by: PSYCHIATRY & NEUROLOGY

## 2024-10-14 RX ORDER — CHOLECALCIFEROL (VITAMIN D3) 1250 MCG
1250 CAPSULE ORAL
Status: DISPENSED | OUTPATIENT
Start: 2024-10-15

## 2024-10-14 RX ORDER — OLANZAPINE 10 MG/1
10 TABLET, ORALLY DISINTEGRATING ORAL 2 TIMES DAILY
Status: DISCONTINUED | OUTPATIENT
Start: 2024-10-14 | End: 2024-10-15

## 2024-10-14 RX ORDER — NICOTINE 21 MG/24HR
1 PATCH, TRANSDERMAL 24 HOURS TRANSDERMAL DAILY
Status: DISPENSED | OUTPATIENT
Start: 2024-10-14

## 2024-10-14 RX ORDER — TRAZODONE HYDROCHLORIDE 50 MG/1
50 TABLET, FILM COATED ORAL
Status: DISPENSED | OUTPATIENT
Start: 2024-10-14

## 2024-10-14 RX ADMIN — CINACALCET 30 MG: 30 TABLET ORAL at 10:15

## 2024-10-14 RX ADMIN — FUROSEMIDE 40 MG: 40 TABLET ORAL at 10:15

## 2024-10-14 RX ADMIN — CINACALCET 30 MG: 30 TABLET ORAL at 19:05

## 2024-10-14 RX ADMIN — Medication 1 PATCH: at 15:43

## 2024-10-14 RX ADMIN — Medication 3 MG: at 18:51

## 2024-10-14 RX ADMIN — CLONIDINE HYDROCHLORIDE 0.1 MG: 0.1 TABLET ORAL at 10:15

## 2024-10-14 RX ADMIN — OLANZAPINE 10 MG: 10 TABLET, ORALLY DISINTEGRATING ORAL at 19:06

## 2024-10-14 RX ADMIN — ATORVASTATIN CALCIUM 40 MG: 20 TABLET, FILM COATED ORAL at 10:15

## 2024-10-14 RX ADMIN — Medication 25 MG: at 23:22

## 2024-10-14 RX ADMIN — METOPROLOL SUCCINATE 50 MG: 50 TABLET, EXTENDED RELEASE ORAL at 10:15

## 2024-10-14 RX ADMIN — LISINOPRIL 40 MG: 10 TABLET ORAL at 10:15

## 2024-10-14 RX ADMIN — AMLODIPINE BESYLATE 10 MG: 5 TABLET ORAL at 10:15

## 2024-10-14 RX ADMIN — CLONIDINE HYDROCHLORIDE 0.1 MG: 0.1 TABLET ORAL at 19:05

## 2024-10-14 ASSESSMENT — ACTIVITIES OF DAILY LIVING (ADL)
ORAL_HYGIENE: PROMPTS;INDEPENDENT
ADLS_ACUITY_SCORE: 28
DRESS: PROMPTS;INDEPENDENT
ADLS_ACUITY_SCORE: 28
ADLS_ACUITY_SCORE: 48
ADLS_ACUITY_SCORE: 48
ADLS_ACUITY_SCORE: 28
HYGIENE/GROOMING: PROMPTS;INDEPENDENT
ADLS_ACUITY_SCORE: 48
ADLS_ACUITY_SCORE: 48
ADLS_ACUITY_SCORE: 28
ADLS_ACUITY_SCORE: 48
ADLS_ACUITY_SCORE: 28
LAUNDRY: WITH SUPERVISION
ADLS_ACUITY_SCORE: 28

## 2024-10-14 NOTE — PLAN OF CARE
BEH IP Unit Acuity Rating Score (UARS)  Patient is given one point for every criteria they meet.    CRITERIA SCORING   On a 72 hour hold, court hold, committed, stay of commitment, or revocation. 1    Patient LOS on BEH unit exceeds 20 days. 0  LOS: 2   Patient under guardianship, 55+, otherwise medically complex, or under age 11. 1   Suicide ideation without relief of precipitating factors. 0   Current plan for suicide. 0   Current plan for homicide. 0   Imminent risk or actual attempt to seriously harm another without relief of factors precipitating the attempt. 1   Severe dysfunction in daily living (ex: complete neglect for self care, extreme disruption in vegetative function, extreme deterioration in social interactions). 1   Recent (last 7 days) or current physical aggression in the ED or on unit. 0   Restraints or seclusion episode in past 72 hours. 0   Recent (last 7 days) or current verbal aggression, agitation, yelling, etc., while in the ED or unit. 1 - last 10/13   Active psychosis. 1   Need for constant or near constant redirection (from leaving, from others, etc).  1   Intrusive or disruptive behaviors. 1   Patient requires 3 or more hours of individualized nursing care per 8-hour shift (i.e. for ADLs, meds, therapeutic interventions). 1   TOTAL 9

## 2024-10-14 NOTE — PLAN OF CARE
Team Note Due:  Monday    Assessment/Intervention/Current Symtoms and Care Coordination:  Chart review and met with team, discussed pt progress, symptomology, and response to treatment.  Discussed the discharge plan and any potential impediments to discharge.    Per MD, a petition for MI Commitment and [facility]tega will be filed due to concerns for safety. Documents given to MD to complete. Writer completed Exhibit A. Sent completed documents to leadership for signature.    Writer spoke with [facility] as pt most recently had residence there but due to patient selling his home in August, [facility] stated they would not accept the petition.    Writer spoke with Lonnie Co as pt last stayed in a hotel in [place]. [facility] will accept the petition. Provided initial information to have case assigned to [facility].    Petition documents sent to [employer] at 2:05pm. Called and left vm with [facility] intake to confirm documents were sent. Katie responded and confirmed documents were received and this will be assigned to Shelby MIJARES    Copy of petition documents placed in pt's paper chart.    Received call from [facility] Shelby confirming she plans to visit sometime tomorrow morning before 12pm. She requested clarification on pt's  as they have things noted by the county as his  being 1961. RN verified  via pt's drivers license in his locker as 1960 and confirmed this with Shelby.    Discharge Plan or Goal:  TBD - patient has been living out of hotels for the last 2 months but may be able to stay with one of his daughters upon discharge.     Barriers to Discharge:  Patient requires further psychiatric stabilization due to current symptomology, medication management with changes subject to provider, coordination with outside supports, and aftercare planning. Pt is also on [facility] and a petition for MI Commitment/Ortega has been filed.     Referral Status:  None at this time     Legal Status:  [facility] - 10/11 at 0949 - 10/16 at 0949.      Petition for MI Commitment and Ortega filed 10/14/24  County: [place]  File Number: [id number]  Start and expiration date of commitment: TBD    Ortega meds requested: Haldol, Prolixin, Clozaril, Risperdal, Invega, Zyprexa, Seroquel, Abilify    [facility]PS:  Shelby Chowdary: [phone]  [email or web address]    Contacts:  N/A      Upcoming Meetings and Dates/Important Information and next steps:  Follow up on petition

## 2024-10-14 NOTE — PLAN OF CARE
"Pt was sleeping when staff got here.  Pt slept until about 1000 am.  Pt called his daughter.  Pt compliant with having his vitals taken and took am medications.  Pt later in shift pt getting more agitated asking for his belongings for discharge.  Tried to explain again that we need a discharge order from MD.  Pt continued to state he wanted his belongings.  Staff attempted to redirect and distract him.  Pt currently watching TV in [facility].  Pt took a nicotine patch off that he rec'd at [facility].  Got new order for another one. Pt has said many times that he wants to smoke.  Offered him the nicotine patch.  Pt looked at patch very carefully.  Then he proceeded to attempt to put the pat on a piece of paper.  Took patch back.  Explained to pt to ask for it if he wanted it.  Pt did able to state he was at the hospital.  Asked which hospital pt [facility].  Pt states does not need to be in hospital and how he is in half-way.   Pt remains on 2:1 SIO for severe intrusiveness  and assault risk.    Problem: Adult Behavioral Health Plan of Care  Goal: Plan of Care Review  Outcome: Not Progressing  Goal: Patient-Specific Goal (Individualization)  Description: You can add care plan individualizations to a care plan. Examples of Individualization might be:  \"Parent requests to be called daily at 9am for status\", \"I have a hard time hearing out of my right ear\", or \"Do not touch me to wake me up as it startles  me\".  Outcome: Not Progressing  Goal: Adheres to Safety Considerations for Self and Others  Outcome: Not Progressing  Goal: Absence of New-Onset Illness or Injury  Outcome: Not Progressing  Goal: Optimized Coping Skills in Response to Life Stressors  Outcome: Not Progressing  Goal: Develops/Participates in Therapeutic [place] to Support Successful Transition  Outcome: Not Progressing     Problem: Psychotic Signs/Symptoms  Goal: Improved Behavioral Control (Psychotic Signs/Symptoms)  Outcome: Not " "Progressing  Goal: Optimal Cognitive Function (Psychotic Signs/Symptoms)  Outcome: Not Progressing  Goal: Increased Participation and Engagement (Psychotic Signs/Symptoms)  Outcome: Not Progressing  Goal: Improved Mood Symptoms (Psychotic Signs/Symptoms)  Outcome: Not Progressing  Goal: Improved Psychomotor Symptoms (Psychotic Signs/Symptoms)  Outcome: Not Progressing  Goal: Decreased Sensory Symptoms (Psychotic Signs/Symptoms)  Outcome: Not Progressing  Goal: Improved Sleep (Psychotic Signs/Symptoms)  Outcome: Not Progressing  Goal: Enhanced Social, Occupational or Functional Skills (Psychotic Signs/Symptoms)  Outcome: Not Progressing     Problem: Depression  Goal: Improved Mood  Outcome: Not Progressing     Problem: Suicidal Behavior  Goal: Suicidal Behavior is Absent or Managed  Outcome: Not Progressing     Problem: Anxiety  Goal: Anxiety Reduction or Resolution  Outcome: Not Progressing     Problem: Sleep Disturbance  Goal: Adequate Sleep/Rest  Outcome: Not Progressing     Problem: Seclusion/Restraint, Behavioral  Goal: Seclusion/Behavioral Restraint Goal: Absence of Harm or Injury  Outcome: Not Progressing     Problem: Adult Behavioral Health Plan of Care  Goal: Plan of Care Review  Outcome: Not Progressing  Goal: Patient-Specific Goal (Individualization)  Description: You can add care plan individualizations to a care plan. Examples of Individualization might be:  \"Parent requests to be called daily at 9am for status\", \"I have a hard time hearing out of my right ear\", or \"Do not touch me to wake me up as it startles  me\".  Outcome: Not Progressing  Goal: Adheres to Safety Considerations for Self and Others  Outcome: Not Progressing  Goal: Absence of New-Onset Illness or Injury  Outcome: Not Progressing  Goal: Optimized Coping Skills in Response to Life Stressors  Outcome: Not Progressing  Goal: Develops/Participates in Therapeutic [place] to Support Successful Transition  Outcome: Not Progressing     Problem: " Psychotic Signs/Symptoms  Goal: Improved Behavioral Control (Psychotic Signs/Symptoms)  Outcome: Not Progressing  Goal: Optimal Cognitive Function (Psychotic Signs/Symptoms)  Outcome: Not Progressing  Goal: Increased Participation and Engagement (Psychotic Signs/Symptoms)  Outcome: Not Progressing  Goal: Improved Mood Symptoms (Psychotic Signs/Symptoms)  Outcome: Not Progressing  Goal: Improved Psychomotor Symptoms (Psychotic Signs/Symptoms)  Outcome: Not Progressing  Goal: Decreased Sensory Symptoms (Psychotic Signs/Symptoms)  Outcome: Not Progressing  Goal: Improved Sleep (Psychotic Signs/Symptoms)  Outcome: Not Progressing  Goal: Enhanced Social, Occupational or Functional Skills (Psychotic Signs/Symptoms)  Outcome: Not Progressing     Problem: Depression  Goal: Improved Mood  Outcome: Not Progressing     Problem: Suicidal Behavior  Goal: Suicidal Behavior is Absent or Managed  Outcome: Not Progressing     Problem: Anxiety  Goal: Anxiety Reduction or Resolution  Outcome: Not Progressing     Problem: Sleep Disturbance  Goal: Adequate Sleep/Rest  Outcome: Not Progressing     Problem: Seclusion/Restraint, Behavioral  Goal: Seclusion/Behavioral Restraint Goal: Absence of Harm or Injury  Outcome: Not Progressing   Goal Outcome Evaluation:

## 2024-10-14 NOTE — CARE PLAN
10/13/24 0950   Seclusion or Restraint Order Upon Initiation and With Every Order   Attending Provider Notified Yes   Attending Provider's Name Dr Trujillo   In Person Face to Face Assessment Conducted Yes-Eval of pt's immediate situation, reaction to intervention, complete review of systems assessment, behavioral assessment & review/assessment of hx, drugs & meds, recent labs, etc, behavioral condition, need to continue/terminate restraint/seclusion   RN Notified Provider of Result of Face to Face Yes   Explain why Face to Face not done N/A   Describe adverse physical outcome N/A   Describe actions taken N/A   Describe follow-up needed none

## 2024-10-14 NOTE — PLAN OF CARE
Problem: Sleep Disturbance  Goal: Adequate Sleep/Rest  Outcome: Progressing   Goal Outcome Evaluation:    Patient appears to have slept a total of 6.25 hours. Safety/environment checks conducted every 15 minutes with no concerns noted. No complaints of pain/discomfort.

## 2024-10-14 NOTE — PROGRESS NOTES
"  ----------------------------------------------------------------------------------------------------------  [facility]  Psychiatry Progress Note  Hospital Day #2     Interim History:     The patient's care was discussed with the treatment team and chart notes were reviewed.    Vitals: VSS  Sleep: 6.25 hours (10/14/24 0657)  Scheduled medications: Took all scheduled medications as prescribed  Psychiatric PRN medications: No psychiatric prns given    Staff Report:   No acute events or safety concerns overnight. Please see staff notes for details.     Subjective:     Patient Interview:  Estevan Aaron was interviewed in his room. Describes this weekend as \"no good\". Perseverates on his belongings being missing and about being chased by a realtor named Wang Gomez for the past 6 months.  Reports that he was going to sell his property in July but the realtor was not able to close the deal. Then explains he's been chased by the realtor and his gangs for the past 6 months, how the realtor has been altering things over the phone. When there was a beep in the room (from a phone), patient says \"that's how he (realtor) identifies himself. Turn that phone off\".     He expresses increased anxiety for the past 6 months since being chased; has been staying in hotels to be be a safe environment. As far as the events leading up to this current admission, says that he was watching football at the hotel he was staying at,  saw a truck pulling into the hotel, saw people outside, got extremely anxious and called the police. Upon being questioned about the gang members, he responds \"It's not in my head, promise me\".    Upon being asked about his understanding of why he's hospitalized, he notes \"I was just looking for shelter.\" When informed his daughters are worried about him, he responded with \"my daughter, who is a nurse, does not want me to take Zyprexa because it gets you high. I don't " "need it.\" Denies needing to be in the hospital and expresses desire to be discharged. Upon being asked how the writer can help, he responds, \"not my brain. My brain is fine\". Regarding sleep, patient notes he \"always get sleep\" but unable to say how many hours of sleep he's been getting.     Collateral was obtained from daughter Maria C Aaron ([phone]):   Maria C shares the patient has a long history of intermittent delusions (decades). Patient has said \"weird things, like seeing UFOs, aliens, people moving in the trees\" for daughter's entire life, but these symptoms have never impacted his functioning as a . Patient split with his ex-wife in spring of 2023. The divorce was finalized in August 2024. Daughter feels a history of \"anger issues\" in the patient and has been \"sleeping with a gun next to him\" for multiple years. Daughter shares patient has slept in a recliner for years and snores loudly. Patient repeatedly declined family wishes for him to get a sleep study. Patient became much more anxious during covid, when he started fluoxetine. Daughter states he was forced to sell his \"dream home\", which he largely built himself. She shared it has been incredibly hard for him to part with the home and is emotionally difficult for him. Shared patient bought a 70k truck in cash the past few weeks. Daughter says patient or family has no known hospitalizations for psychiatric illness. Says patient's memory has appeared to decline in the past 2 years. For example, last thanksgiving he forgot the name of the long-time family cat. Daughter states patient has \"many guns\". Shares that patient is avid buck and hunted as much as he could each fall.    ROS:  Patient has no bothersome physical symptoms  Patient denies insomnia (falling asleep  or staying asleep )     Objective:     Vitals:  BP (!) 141/76   Pulse 75   Temp 97.7  F (36.5  C) (Oral)   Resp 16   Wt 105.7 kg (233 lb)   SpO2 96%   BMI 37.61 kg/m  "     Allergies:  No Known Allergies    Current Medications:  Scheduled:  Current Facility-Administered Medications   Medication Dose Route Frequency Provider Last Rate Last Admin    acetaminophen (TYLENOL) tablet 650 mg  650 mg Oral Q4H PRN Nikki Caceres MD        alum & mag hydroxide-simethicone (MAALOX) suspension 30 mL  30 mL Oral Q4H PRN Nikki Caceres MD        amLODIPine (NORVASC) tablet 10 mg  10 mg Oral Daily Nikki Caceres MD   10 mg at 10/14/24 1015    atorvastatin (LIPITOR) tablet 40 mg  40 mg Oral Daily Nikki Caceres MD   40 mg at 10/14/24 1015    cinacalcet (SENSIPAR) tablet 30 mg  30 mg Oral BID Britt Kang MD   30 mg at 10/14/24 1015    cloNIDine (CATAPRES) tablet 0.1 mg  0.1 mg Oral BID Nikki Caceres MD   0.1 mg at 10/14/24 1015    [Held by provider] FLUoxetine (PROzac) capsule 40 mg  40 mg Oral Daily Nikki Caceres MD        furosemide (LASIX) tablet 40 mg  40 mg Oral Daily Nikki Caceres MD   40 mg at 10/14/24 1015    gabapentin (NEURONTIN) capsule 100 mg  100 mg Oral Q6H PRN Nikki Caceres MD        lisinopril (ZESTRIL) tablet 40 mg  40 mg Oral Daily Nikki Caceres MD   40 mg at 10/14/24 1015    melatonin tablet 3 mg  3 mg Oral At Bedtime PRN Nikki Caceres MD        metoprolol succinate ER (TOPROL XL) 24 hr tablet 50 mg  50 mg Oral Daily Nikki Caceres MD   50 mg at 10/14/24 1015    nicotine (NICODERM CQ) 21 MG/24HR 24 hr patch 1 patch  1 patch Transdermal Daily PRN Britt Kang MD   1 patch at 10/13/24 1611    OLANZapine (zyPREXA) tablet 5 mg  5 mg Oral 4x Daily PRN Britt Kang MD   5 mg at 10/13/24 1348    Or    OLANZapine (zyPREXA) injection 5 mg  5 mg Intramuscular 4x Daily PRN Britt Kang MD   5 mg at 10/13/24 1702    OLANZapine zydis (zyPREXA) ODT tab 10 mg  10 mg Oral At Bedtime Dagher, Ramez, MD   10 mg at 10/13/24 2042    polyethylene glycol (MIRALAX) Packet 17 g  17 g Oral Daily PRN Nikki Caceres MD            PRN:  Current Facility-Administered Medications   Medication Dose Route Frequency Provider Last Rate Last Admin    acetaminophen (TYLENOL) tablet 650 mg  650 mg Oral Q4H PRN Nikki Caceres MD        alum & mag hydroxide-simethicone (MAALOX) suspension 30 mL  30 mL Oral Q4H PRN Nikki Caceres MD        amLODIPine (NORVASC) tablet 10 mg  10 mg Oral Daily Nikki Caceres MD   10 mg at 10/14/24 1015    atorvastatin (LIPITOR) tablet 40 mg  40 mg Oral Daily Nikki Caceres MD   40 mg at 10/14/24 1015    cinacalcet (SENSIPAR) tablet 30 mg  30 mg Oral BID Britt Kang MD   30 mg at 10/14/24 1015    cloNIDine (CATAPRES) tablet 0.1 mg  0.1 mg Oral BID Nikki Caceres MD   0.1 mg at 10/14/24 1015    [Held by provider] FLUoxetine (PROzac) capsule 40 mg  40 mg Oral Daily Nikki Caceres MD        furosemide (LASIX) tablet 40 mg  40 mg Oral Daily Nikki Caceres MD   40 mg at 10/14/24 1015    gabapentin (NEURONTIN) capsule 100 mg  100 mg Oral Q6H PRN Nikki Caceres MD        lisinopril (ZESTRIL) tablet 40 mg  40 mg Oral Daily Nikki Caceres MD   40 mg at 10/14/24 1015    melatonin tablet 3 mg  3 mg Oral At Bedtime PRN Nikki Caceres MD        metoprolol succinate ER (TOPROL XL) 24 hr tablet 50 mg  50 mg Oral Daily Nikki Caceres MD   50 mg at 10/14/24 1015    nicotine (NICODERM CQ) 21 MG/24HR 24 hr patch 1 patch  1 patch Transdermal Daily PRN Britt Kang MD   1 patch at 10/13/24 1611    OLANZapine (zyPREXA) tablet 5 mg  5 mg Oral 4x Daily PRN Britt Kang MD   5 mg at 10/13/24 1348    Or    OLANZapine (zyPREXA) injection 5 mg  5 mg Intramuscular 4x Daily PRN Britt Kang MD   5 mg at 10/13/24 1702    OLANZapine zydis (zyPREXA) ODT tab 10 mg  10 mg Oral At Bedtime Dagher, Ramez, MD   10 mg at 10/13/24 2042    polyethylene glycol (MIRALAX) Packet 17 g  17 g Oral Daily PRN Nikki Caceres MD           Labs and Imaging:  New results:   Recent Results  "(from the past 24 hour(s))   UA with Microscopic reflex to Culture    Collection Time: 10/13/24  2:47 PM    Specimen: Urine, NOS   Result Value Ref Range    Color Urine Light Yellow Colorless, Straw, Light Yellow, Yellow    Appearance Urine Clear Clear    Glucose Urine Negative Negative mg/dL    Bilirubin Urine Negative Negative    Ketones Urine Negative Negative mg/dL    Specific Gravity Urine 1.010 1.003 - 1.035    Blood Urine Negative Negative    pH Urine 6.0 5.0 - 7.0    Protein Albumin Urine Negative Negative mg/dL    Urobilinogen Urine Normal Normal, 2.0 mg/dL    Nitrite Urine Negative Negative    Leukocyte Esterase Urine Negative Negative    RBC Urine <1 <=2 /HPF    WBC Urine 1 <=5 /HPF       Data this admission:  - CBC unremarkable  - CMP unremarkable  - TSH normal  - UDS negative  - Vit D low  - Hgb A1c 5.9 (10/13/24)  - Lipids unremarkable  - Vit B12 normal  - Folate normal  - Urinalysis unremarkable  - EKG normal sinus rhythm, QTc 390  - Head CT showed no acute changes     Mental Status Exam:     Oriented to:  Grossly Oriented, Person/Self, City, and Year  General:  Awake and Alert  Appearance:  appears stated age  Behavior/Attitude:  accusatory, grandiose, easily distracted, secretive, and suspicious  Eye Contact:  appropriate  Psychomotor: no evidence of tics, dystonia, or tardive dyskinesia, agitated, and restless no catatonia present  Speech:  loud volume/tone and talkative  Language: Fluent in English with appropriate syntax and vocabulary.  Mood:  \"no good\"  Affect:  irritable  Thought Process:  looseness of association, thought blocking, disorganized, and confused  Thought Content:  homicidal ideation; delusions of paranoia and delusions of grandiosity  Associations:  loose  Insight:  impaired due to beliefs that he is being followed by realtors  Judgment:  impaired due to recent plan to fire gun at \"mob\" at hotel. Does not recognize that he needs treatment  Impulse control: decreased: loaded gun " with HI  Attention Span:  decreased  Concentration: Distractible  Recent and Remote Memory:  not formally assessed  Fund of Knowledge:  Not assessed  Muscle Strength and Tone: normal  Gait and Station: Normal     Psychiatric Assessment     Estevan Aaron is a 64 year old male with previous psychiatric diagnoses of unspecified anxiety vs generalized anxiety disorder admitted from the ER on 10/12/2024 due to concern for HI and psychosis in the context of medical issues (hyperthyroidism, hypercalcemia), psychosocial stressors including family dynamics with recent possible divorce. This is the patient's first psychiatric hospitalization and possibly contact with psychiatry. Significant symptoms on admission include delusions of persecution with grandiose beliefs, as with secondary homicidal ideations, as well as disorganized thinking and behavior. The MSE on admission was pertinent for a thought process which was perseverative, with rambling, looseness of association, circumstantial, tangential, flight of ideals, disorganized, word salad, and incoherent. Psychological contributions to mental health presentation include absent insight at the moment. Social factors contributing to mental health presentation include a possibly poor support system as well as possible marital conflicts that could be secondary or triggers to mental health symptoms. Biological contributions to mental health presentation include a history of hyperparathyroidism with chronic hypercalcemia per charts.     Patient was an extremely poor historian due to severe disorganized thinking on interview; he is observed with 1) persecutory delusion pertaining to the realtor and his gang members, 2) disorganized behavior as these delusions have led him to flee to different hotels to stay safe/ has called  complaining of being targeted and 3) auditory hallucinations of voices for the past 12 months. Patient's symptoms of severely disorganized thinking and  behavior with delusions fitting a picture of acute to subacute psychosis. Though he has no prior dx of psychosis or past decompensation leading to psychiatric hospitalization, per collateral, in the past has endorsed visual hallucinations 10-15 years ago and in the past has been paranoid/distrusting of others, hence this may not be a first episode psychosis.     Due to patient's prior history of anxiety with possible / speculated comorbid mood components with it and his current treatment with fluoxetine, and considering the patient grossly showed symptoms on interview such as an affect that was irritable, non blunted, and somewhat labile, grandiosity in regards to his profession, flight of ideas, and self-described changes in sleep that has been more poor, primary leading ddx is bipolar I disorder, current manic episode with psychotic features. Additionally, per collateral, he has recently been noted with hyper verbal speech and recently has spent $70,000 on a car in the past month, which is not usual for him. For these reasons, it would be valuable to hold his Fluoxetine at the moment and continue to monitor for symptoms.     Patient's presentation remains unusual in regards to his age, and psychosis/zelalem due to a medical illness such chronic hypercalcemia, his long standing hyperparathyroid disorder, or any other possible endocrine disorder or syndrome associated with these disorders can be contributing to the patient's symptoms, as case reports in the literature have correlated onsets of psychosis with hypercalcemia or hyperparathyroidism. A prompt management of his hyperparathyroidism might thus be beneficial to patient's mental health presentation.    In summary, the patient's diagnosis is still in evolution. He will likely benefit from further assessment and management this admission. Given that he currently has HI and psychosis, patient warrants inpatient psychiatric hospitalization to maintain his  safety.     Psychiatric Plan by Diagnosis      Today's changes:  - Increase Olanzapine from 10 mg at bedtime to Olanzapine 10 mg BID  - Start Vitamin D3 1250 mcg supplementation  - Filed for commitment with Jordan through [facility]      # Bipolar I Disorder, current manic episode with psychotic features    1. Medications:  - Hold PTA fluoxetine 40 mg.  - Zyprexa 10 mg BID      2. Pertinent Labs/Monitoring:   - Ca2+ (10/13) 12.5, (10/14) 11.4      3. Additional Plans:  - Patient will be treated in therapeutic milieu with appropriate individual and group therapies as described     # Unspecified anxiety vs Generalized Anxiety Disorder  - Monitor for symptoms.  - Fluoxetine held due to suspicion of ongoing manic symptoms.     Psychiatric Hospital Course:      Estevan Aaron was admitted to [facility] on a 72 hour hold.   Medications:  PTA fluoxetine was held due to concern for worsening of zelalem   New medications started at the time of admission include olanzapine.   Zyprexa 10 mg at bedtime was increased to 10 BID (10/14)     The risks, benefits, alternatives, and side effects were discussed and understood by the patient and other caregivers.     Medical Assessment and Plan     Medical diagnoses to be addressed this admission:    # Hypertension  - Continue PTA medications  Furosemide 40 mg daily  Lisinopril 40 mg daily  Metoprolol 50 mg daily  Amlodipine 10 mg daily  Clonidine 0.1 mg BID     # Hyperlipidemia  - Continue PTA Atorvastatin 40 mg     # Hyperparathyroidism  # Hypercalcemia, hypophosphoremia   Increased Ca level to 10.9 and decreased Ph level to 2.3 in the ED. Suspicion patient's hypercalcemia could be contributing to symptoms of psychosis.  - Consulted endocrinology, who started cinacalcet 30 mg BID on 10/13     Medical course: Patient was physically examined by the ED prior to being transferred to the unit and was found to be medically stable and appropriate for admission.      Consults: Psychiatry,  Endocrinology (follow-up of hyperthyroidism / hypercalcemia and hypertension)     Checklist     Legal Status: 72-h Hold signed on 10/12 , Filed for commitment with Jordan through [facility] on 10/14    Safety Assessment:   Behavioral Orders   Procedures    Assault precautions    Code 1 - Restrict to Unit    Elopement precautions    Routine Programming     As clinically indicated    Status 15     Every 15 minutes.    Status Individual Observation     2:1 SIO    Patient SIO status reviewed with team/RN.  Please also refer to RN/team documentation for add'l detail.    -SIO staff to monitor following which have contributed to patient being on SIO:  Pt has psychosis regarding being followed and attacked, very paranoid, HI    -Possible interventions SIO staff could use to support patient's treatment progress:  Redirect and reassure  -When following observed, team will review discontinuation of SIO:  Psychosis improves     Order Specific Question:   CONTINUOUS 24 hours / day     Answer:   Other     Order Specific Question:   Specify distance     Answer:   10 feet     Order Specific Question:   Indications for SIO     Answer:   Assault risk     Order Specific Question:   Indications for SIO     Answer:   Severe intrusiveness       Risk Assessment:  Risk for harm is elevated.  Risk factors: SI, HI, impulsive, and past behaviors  Protective factors: family     SIO: 2:1    Disposition: Pending stabilization, medication optimization, & development of a safe discharge plan. We are in the process of submitting commitment request to [facility].     Attestations     Chandra Castanon, MS3  [facility] [employer]    Resident/Fellow Attestation   I, Evelia Grimm DO, was present with the medical/ALLEN student who participated in the service and in the documentation of the note.  I have verified the history and personally performed the physical exam and medical decision making.  I agree with the assessment and plan of care as documented in the  note.        Evelia Grimm DO  PGY-1   Date of Service (when I saw the patient): 10/14/24    Attestation:  This patient has been seen and evaluated by me, Pete Smith MD.  I have discussed this patient with the house staff team including the resident and/or medical student and I agree with the findings and plan in this note.    I have reviewed today's vital signs, medications, labs and imaging. Pete Smith MD , PhD.

## 2024-10-14 NOTE — PROVIDER NOTIFICATION
10/13/24 0950   Seclusion or Restraint Order Upon Initiation and With Every Order   In Person Face to Face Assessment Conducted Yes-Eval of pt's immediate situation, reaction to intervention, complete review of systems assessment, behavioral assessment & review/assessment of hx, drugs & meds, recent labs, etc, behavioral condition, need to continue/terminate restraint/seclusion   RN Notified Provider of Result of Face to Face Yes   Explain why Face to Face not done N/A   Describe adverse physical outcome N/A   Describe actions taken N/A   Describe follow-up needed none     Writer conducted face to face with patient regarding physical hold; recent orders, labs, and medications reviewed. Pt reported no physical sequelae following physical hold event. Pt showing minimally insight regarding situation but was able to agree to walk to room after medication administration. Primary RN and on call provider notified of face to face results.

## 2024-10-14 NOTE — PROGRESS NOTES
Endocrine Progress Note  Patient: Estevan Aaron   MRN: [id number]  Date of Service: 10/14/2024      Physical Examination:  BP (!) 141/76   Pulse 75   Temp 97.7  F (36.5  C) (Oral)   Resp 16   Wt 105.7 kg (233 lb)   SpO2 96%   BMI 37.61 kg/m        Medications:  Medications:    Scheduled:   amLODIPine, 10 mg, Daily  atorvastatin, 40 mg, Daily  cinacalcet, 30 mg, BID  cloNIDine, 0.1 mg, BID  [Held by provider] FLUoxetine, 40 mg, Daily  furosemide, 40 mg, Daily  lisinopril, 40 mg, Daily  metoprolol succinate ER, 50 mg, Daily  OLANZapine zydis, 10 mg, At Bedtime      Endocrine Labs     Most Recent 3 CBC's:  Recent Labs   Lab Test 10/10/24  2205 02/20/24  0933 02/17/23  1159   WBC 9.9 9.8 8.1   HGB 14.2 15.6 16.0   MCV 64* 63* 62*    214 209      Most Recent 3 BMP's:  Recent Labs   Lab Test 10/13/24  0805 10/10/24  2205 02/20/24  0933 09/22/23  1245   NA  --  137 138 137   POTASSIUM  --  4.2 4.8 4.6   CHLORIDE  --  102 102 103   CO2  --  22 29 26   BUN  --  19.2 14.8 14.6   CR  --  1.05 1.00 0.90   ANIONGAP  --  13 7 8   UDAY 12.5* 10.9* 11.4* 11.0*   GLC  --  124* 97 88      Latest Reference Range & Units 09/22/23 12:45 02/20/24 09:33 10/10/24 22:05 10/13/24 08:05 10/14/24 12:41   Calcium 8.8 - 10.4 mg/dL 11.0 (H) 11.4 (H) 10.9 (H) 12.5 (H) 11.4 (H)   Albumin 3.5 - 5.2 g/dL  4.8 4.1 4.6 4.3   (H): Data is abnormally high(H): Data is abnormally high  Most Recent 2 LFT's:  Recent Labs   Lab Test 10/10/24  2205 02/20/24  0933   AST 35 34   ALT 26 45   ALKPHOS 124 110   BILITOTAL 0.5 0.9     Most Recent Cholesterol Panel:  Recent Labs   Lab Test 10/13/24  0805   CHOL 210*   *   HDL 30*   TRIG 362*     Most Recent TSH, T4 and A1c Labs:  Recent Labs   Lab Test 10/13/24  0805 10/10/24  2205   TSH  --  2.99   T4 1.13  --    A1C 5.9*  --      Most Recent 6 glucoses:  Recent Labs   Lab Test 10/10/24  2205 02/20/24  0933 09/22/23  1245 03/22/23  1004 02/17/23  1159 01/18/22  0851   * 97 88 116* 78 98          Assessment and plan:  Estevan Aaron is a 64 year old  male who was hospitalized on the inpatient psychiatry service: He was admitted from the ER on 10/12/2024 due to concern for homicidal ideation and psychosis.       Plan:   1. Primary Hyperparathyroidism: The patient has a history of mild to moderate hypercalcemia, along with elevated parathyroid hormone levels and upper-normal 24-hour urine calcium excretion, indicating primary hyperparathyroidism. A review of his records in our EHR shows no prior use of lithium. It was given 2 doses on 10/12/24, 11AM, 8 PM and Today in the AM was given. Scheduled today at 8PM. Calcium today is 11.4, will continue monitoring.      2. Psychosis: While hypercalcemia can sometimes cause altered mental status or psychosis, it s unclear if this is the cause in this patient. Longstanding mild to moderate hypercalcemia (present since 2015) is generally well-tolerated. However, treatment for primary hyperparathyroidism is advised due to the patient's mental health changes, hypercalcemia severity, and history of kidney stones. I'm unsure if the patient is able to consent to immediate intervention, such as surgery. Cinacalcet could be an option to manage calcium levels in the short term.    3. History of Kidney Stones    4. Vitamin D Deficiency: Noted in April 2019.18 (10/13/24)    Plan:   - Continue monitoring, calcium and albumin, daily. Will consider Bisphosphonate if no responding to treatment.   - Continue with fluids   - DEXA scan, outpatient.   - Endocrinology will follow up      Erica Alvarez MD   Endocrinology Fellow   Page # [phone]  On Afsaneh    Case was discussed and reviewed with Endocrinology Attending Dr. David Hull

## 2024-10-14 NOTE — PROVIDER NOTIFICATION
10/14/24 1114   Individualization/Patient Specific Goals   Patient Personal Strengths resourceful;resilient;positive vocational history;ability to maintain sobriety   Patient Vulnerabilities housing insecurity;lacks insight into illness   Interprofessional Rounds   Summary New admit. Admitted due to concern for psychosis. Pt placed on 72HH while in ED.   Participants nursing;CTC;psychiatrist;other (see comments)   Behavioral Team Discussion   Participants Dr. Sarah MD; Dr. Sirisha DO; Catherine GRACE; Kylee Becerra [facility]; medical students   Progress Initial assessment   Anticipated length of stay 5-10 days   Continued Stay Criteria/Rationale Medication management; symptom stabilization; care coordination   Medical/Physical See H&P   Precautions See below   Plan Psychiatric assessment/Medication management. Therapeutic Milieu. Individual care planning and after care planning. Patient to participate in unit groups and activities. Individual and group support on unit.   Safety Plan Completed by unit therapist prior to discharge   Anticipated Discharge Disposition home with family     PRECAUTIONS AND SAFETY    Behavioral Orders   Procedures    Assault precautions    Code 1 - Restrict to Unit    Elopement precautions    Routine Programming     As clinically indicated    Status 15     Every 15 minutes.    Status Individual Observation     2:1 SIO    Patient SIO status reviewed with team/RN.  Please also refer to RN/team documentation for add'l detail.    -SIO staff to monitor following which have contributed to patient being on SIO:  Pt has psychosis regarding being followed and attacked, very paranoid, HI    -Possible interventions SIO staff could use to support patient's treatment progress:  Redirect and reassure  -When following observed, team will review discontinuation of SIO:  Psychosis improves     Order Specific Question:   CONTINUOUS 24 hours / day     Answer:   Other     Order Specific Question:   Specify distance      Answer:   10 feet     Order Specific Question:   Indications for SIO     Answer:   Assault risk     Order Specific Question:   Indications for SIO     Answer:   Severe intrusiveness       Safety  Safety WDL: WDL  Patient Location: hallway, patient room, own, lounge, dining room  Observed Behavior:  (pacing, agitated)  Safety Measures: safety rounds completed  Assault: status 15, status continuous sight  Elopement Assessment: Loitering near exit doors, Hypervigilance to activities on and off the unit, Statements about wanting to leave, Distress about legal situation (holds for mental health or criminal)  Elopement Interventions: status 15, status continuous sight

## 2024-10-14 NOTE — PLAN OF CARE
Initial meeting note:    Therapist introduced self to patient and discussed psychotherapy service available to patient.     Pt response: Pt reported he plans to discharge today and was not interested in engaging further, did not indicate if he would like to meet in the future.    Plan: Will continue to check in with Pt for 1:1 sessions.

## 2024-10-15 PROCEDURE — 124N000002 HC R&B MH UMMC

## 2024-10-15 PROCEDURE — 250N000013 HC RX MED GY IP 250 OP 250 PS 637

## 2024-10-15 PROCEDURE — 99232 SBSQ HOSP IP/OBS MODERATE 35: CPT | Mod: GC | Performed by: PSYCHIATRY & NEUROLOGY

## 2024-10-15 RX ORDER — OLANZAPINE 5 MG/1
5 TABLET ORAL 3 TIMES DAILY PRN
Status: DISPENSED | OUTPATIENT
Start: 2024-10-15

## 2024-10-15 RX ORDER — OLANZAPINE 10 MG/2ML
5 INJECTION, POWDER, FOR SOLUTION INTRAMUSCULAR 3 TIMES DAILY PRN
Status: ACTIVE | OUTPATIENT
Start: 2024-10-15

## 2024-10-15 RX ORDER — OLANZAPINE 15 MG/1
15 TABLET, ORALLY DISINTEGRATING ORAL AT BEDTIME
Status: DISCONTINUED | OUTPATIENT
Start: 2024-10-15 | End: 2024-10-21

## 2024-10-15 RX ORDER — OLANZAPINE 10 MG/1
10 TABLET ORAL EVERY MORNING
Status: DISCONTINUED | OUTPATIENT
Start: 2024-10-16 | End: 2024-10-28

## 2024-10-15 RX ADMIN — METOPROLOL SUCCINATE 50 MG: 50 TABLET, EXTENDED RELEASE ORAL at 08:30

## 2024-10-15 RX ADMIN — FUROSEMIDE 40 MG: 40 TABLET ORAL at 08:30

## 2024-10-15 RX ADMIN — OLANZAPINE 10 MG: 10 TABLET, ORALLY DISINTEGRATING ORAL at 08:30

## 2024-10-15 RX ADMIN — CLONIDINE HYDROCHLORIDE 0.1 MG: 0.1 TABLET ORAL at 08:30

## 2024-10-15 RX ADMIN — CLONIDINE HYDROCHLORIDE 0.1 MG: 0.1 TABLET ORAL at 21:08

## 2024-10-15 RX ADMIN — AMLODIPINE BESYLATE 10 MG: 5 TABLET ORAL at 08:30

## 2024-10-15 RX ADMIN — CINACALCET 30 MG: 30 TABLET ORAL at 21:08

## 2024-10-15 RX ADMIN — LISINOPRIL 40 MG: 10 TABLET ORAL at 08:30

## 2024-10-15 RX ADMIN — OLANZAPINE 15 MG: 15 TABLET, ORALLY DISINTEGRATING ORAL at 21:08

## 2024-10-15 RX ADMIN — ATORVASTATIN CALCIUM 40 MG: 20 TABLET, FILM COATED ORAL at 08:30

## 2024-10-15 RX ADMIN — Medication 1250 MCG: at 12:52

## 2024-10-15 RX ADMIN — CINACALCET 30 MG: 30 TABLET ORAL at 08:30

## 2024-10-15 RX ADMIN — Medication 25 MG: at 21:08

## 2024-10-15 RX ADMIN — Medication 1 PATCH: at 08:37

## 2024-10-15 ASSESSMENT — ACTIVITIES OF DAILY LIVING (ADL)
ADLS_ACUITY_SCORE: 28
ADLS_ACUITY_SCORE: 48
ADLS_ACUITY_SCORE: 48
ADLS_ACUITY_SCORE: 28
ADLS_ACUITY_SCORE: 48
ADLS_ACUITY_SCORE: 28
ADLS_ACUITY_SCORE: 48
DRESS: INDEPENDENT
ADLS_ACUITY_SCORE: 28
ADLS_ACUITY_SCORE: 28
ADLS_ACUITY_SCORE: 48
HYGIENE/GROOMING: INDEPENDENT
ORAL_HYGIENE: INDEPENDENT
ADLS_ACUITY_SCORE: 48
ADLS_ACUITY_SCORE: 28
ADLS_ACUITY_SCORE: 28
DRESS: INDEPENDENT;SCRUBS (BEHAVIORAL HEALTH)
ADLS_ACUITY_SCORE: 48
LAUNDRY: WITH SUPERVISION
ADLS_ACUITY_SCORE: 28
HYGIENE/GROOMING: INDEPENDENT
ADLS_ACUITY_SCORE: 28
ADLS_ACUITY_SCORE: 48
ADLS_ACUITY_SCORE: 48
ADLS_ACUITY_SCORE: 28
ORAL_HYGIENE: INDEPENDENT
ADLS_ACUITY_SCORE: 48
ADLS_ACUITY_SCORE: 28

## 2024-10-15 NOTE — PLAN OF CARE
BEH IP Unit Acuity Rating Score (UARS)  Patient is given one point for every criteria they meet.    CRITERIA SCORING   On a 72 hour hold, court hold, committed, stay of commitment, or revocation. 1    Patient LOS on BEH unit exceeds 20 days. 0  LOS: 3   Patient under guardianship, 55+, otherwise medically complex, or under age 11. 1   Suicide ideation without relief of precipitating factors. 0   Current plan for suicide. 0   Current plan for homicide. 0   Imminent risk or actual attempt to seriously harm another without relief of factors precipitating the attempt. 1   Severe dysfunction in daily living (ex: complete neglect for self care, extreme disruption in vegetative function, extreme deterioration in social interactions). 1   Recent (last 7 days) or current physical aggression in the ED or on unit. 0   Restraints or seclusion episode in past 72 hours. 0   Recent (last 7 days) or current verbal aggression, agitation, yelling, etc., while in the ED or unit. 1 - last 10/14   Active psychosis. 1   Need for constant or near constant redirection (from leaving, from others, etc).  1   Intrusive or disruptive behaviors. 1   Patient requires 3 or more hours of individualized nursing care per 8-hour shift (i.e. for ADLs, meds, therapeutic interventions). 1   TOTAL 9

## 2024-10-15 NOTE — PLAN OF CARE
Team Note Due:  Monday    Assessment/Intervention/Current Symtoms and Care Coordination:  Chart review and met with team, discussed pt progress, symptomology, and response to treatment.  Discussed the discharge plan and any potential impediments to discharge.    PPS came to meet with pt. Provided Shelby with phone number for pt's daughter Maria C who was able to provide collateral information yesterday to MD. Shelby confirmed she will send an update on support/nonsupport before the end of the day with 72HH expiring tomorrow morning.    Received update from Shelby at 4:05pm they are supporting the petition and a court hold will be issued shortly. MD updated.    Discharge Plan or Goal:  TBD - patient has been living out of hotels for the last 2 months but may be able to stay with one of his daughters upon discharge.     Barriers to Discharge:  Patient requires further psychiatric stabilization due to current symptomology, medication management with changes subject to provider, coordination with outside supports, and aftercare planning. Pt is also on 72HH and a petition for MI Commitment/Ortega has been filed.     Referral Status:  None at this time     Legal Status:  Court hold    Petition for MI Commitment and Ortega filed 10/14/24  [facility]: [place]  File Number: TBD  Start and expiration date of commitment: TBD    Ortega meds requested: Haldol, Prolixin, Clozaril, Risperdal, Invega, Zyprexa, Seroquel, Abilify    PPS:  Shelby Chowdary: [phone]  [email or web address].    Contacts:  N/A      Upcoming Meetings and Dates/Important Information and next steps:  Follow commitment process

## 2024-10-15 NOTE — PROGRESS NOTES
"  ----------------------------------------------------------------------------------------------------------  [facility]  Psychiatry Progress Note  Hospital Day #3     Interim History:     The patient's care was discussed with the treatment team and chart notes were reviewed.    Vitals: VSS  Sleep: 5.25 hours (10/15/24 0623)  Scheduled medications: Took all scheduled medications as prescribed  Psychiatric PRN medications: Trazodone 25 mg x1     Staff Report:   - DARREN team called for physical agitation   - Patient making multiple elopement attempts, staying around the unit exit doors. Patient eventually redirectable back to room and fell asleep by 0045.   -Please see staff notes for details.     Subjective:     Patient Interview:  Estevan Aaron was interviewed in his room. Reports sleep was \"pretty good\".   Still with disorganized thought process as he continues by saying \"they don't wanna hear what they can hear in that room\".     Perseverates on being discharged, still with disorganized thought process.  \"I'm not gonna answer anything\". Upon questioning on mood says \"I don't ever feel down. Yeah, I'm okay.\"  Asks for an \"expert person\" to tell him why he cannot be released at this time. Says \"now you're gonna screw me. So don't go there. My mind is still there adry. You don't have a choice\". Upon being informed that his safety is of concern, he is adamant that there is nothing to worry and that he has \"everything under control\". Then he unpromptly shares that at the hotel he was staying at there were a lot of cars that were sent by the [employer].     Upon being asked if there was anything else team could do to make him more comfortable, he says \"hell no\". When told the [facility]  has arrived says we are \"screwing him.\" Ended the interview by saying he needs to be given time to go buy land or a house.    ROS:  Patient has no bothersome physical symptoms  Patient " denies insomnia (falling asleep  or staying asleep )     Objective:     Vitals:  /80   Pulse 73   Temp 98  F (36.7  C) (Temporal)   Resp 16   Wt 105.7 kg (233 lb)   SpO2 95%   BMI 37.61 kg/m      Allergies:  No Known Allergies    Current Medications:  Scheduled:  Current Facility-Administered Medications   Medication Dose Route Frequency Provider Last Rate Last Admin    - Psychiatric Emergency -   Other See Admin Instructions Evelia Grimm DO        acetaminophen (TYLENOL) tablet 650 mg  650 mg Oral Q4H PRN Nikki Caceres MD        alum & mag hydroxide-simethicone (MAALOX) suspension 30 mL  30 mL Oral Q4H PRN Nikki Caceres MD        amLODIPine (NORVASC) tablet 10 mg  10 mg Oral Daily Nikki Caceres MD   10 mg at 10/14/24 1015    atorvastatin (LIPITOR) tablet 40 mg  40 mg Oral Daily Nikki Caceres MD   40 mg at 10/14/24 1015    cholecalciferol (VITAMIN D3) capsule 1,250 mcg  1,250 mcg Oral Q7 Days Evelia Grimm DO        cinacalcet (SENSIPAR) tablet 30 mg  30 mg Oral BID Britt Kang MD   30 mg at 10/14/24 1905    cloNIDine (CATAPRES) tablet 0.1 mg  0.1 mg Oral BID Nikki Caceres MD   0.1 mg at 10/14/24 1905    [Held by provider] FLUoxetine (PROzac) capsule 40 mg  40 mg Oral Daily Nikki Caceres MD        furosemide (LASIX) tablet 40 mg  40 mg Oral Daily Nikki Caceres MD   40 mg at 10/14/24 1015    gabapentin (NEURONTIN) capsule 100 mg  100 mg Oral Q6H PRN Nikki Caceres MD        lisinopril (ZESTRIL) tablet 40 mg  40 mg Oral Daily Nikki Caceres MD   40 mg at 10/14/24 1015    metoprolol succinate ER (TOPROL XL) 24 hr tablet 50 mg  50 mg Oral Daily Nikki Caceres MD   50 mg at 10/14/24 1015    nicotine (NICODERM CQ) 14 MG/24HR 24 hr patch 1 patch  1 patch Transdermal Daily Evelia Grimm DO   1 patch at 10/14/24 1543    nicotine (NICODERM CQ) 21 MG/24HR 24 hr patch 1 patch  1 patch Transdermal Daily PRN Britt Kang MD   1 patch at 10/13/24 0660     OLANZapine (zyPREXA) tablet 5 mg  5 mg Oral 4x Daily PRN Evelia Grimm DO   5 mg at 10/13/24 1348    Or    OLANZapine (zyPREXA) injection 5 mg  5 mg Intramuscular 4x Daily PRN Evelia Grimm DO   5 mg at 10/13/24 1702    OLANZapine zydis (zyPREXA) ODT tab 10 mg  10 mg Oral BID Evelia Grimm, DO   10 mg at 10/14/24 1906    polyethylene glycol (MIRALAX) Packet 17 g  17 g Oral Daily PRN Nikki Caceres MD        traZODone (DESYREL) half-tab 25 mg  25 mg Oral At Bedtime PRN Evelia Grimm DO   25 mg at 10/14/24 2322    Or    traZODone (DESYREL) tablet 50 mg  50 mg Oral At Bedtime PRN Evelia Grimm DO           PRN:  Current Facility-Administered Medications   Medication Dose Route Frequency Provider Last Rate Last Admin    - Psychiatric Emergency -   Other See Admin Instructions Evelia Grimm DO        acetaminophen (TYLENOL) tablet 650 mg  650 mg Oral Q4H PRN Nikki Caceres MD        alum & mag hydroxide-simethicone (MAALOX) suspension 30 mL  30 mL Oral Q4H PRN Nikki Caceres MD        amLODIPine (NORVASC) tablet 10 mg  10 mg Oral Daily Nikki Caceres MD   10 mg at 10/14/24 1015    atorvastatin (LIPITOR) tablet 40 mg  40 mg Oral Daily Nikki Caceres MD   40 mg at 10/14/24 1015    cholecalciferol (VITAMIN D3) capsule 1,250 mcg  1,250 mcg Oral Q7 Days Evelia Grimm DO        cinacalcet (SENSIPAR) tablet 30 mg  30 mg Oral BID Britt Kang MD   30 mg at 10/14/24 1905    cloNIDine (CATAPRES) tablet 0.1 mg  0.1 mg Oral BID Nikki Caceres MD   0.1 mg at 10/14/24 1905    [Held by provider] FLUoxetine (PROzac) capsule 40 mg  40 mg Oral Daily Nikki Caceres MD        furosemide (LASIX) tablet 40 mg  40 mg Oral Daily Nikki Caceres MD   40 mg at 10/14/24 1015    gabapentin (NEURONTIN) capsule 100 mg  100 mg Oral Q6H PRN Nikki Caceres MD        lisinopril (ZESTRIL) tablet 40 mg  40 mg Oral Daily Nikki Caceres MD   40 mg at 10/14/24 1015    metoprolol succinate ER (TOPROL XL) 24 hr tablet 50  mg  50 mg Oral Daily Nikki Caceres MD   50 mg at 10/14/24 1015    nicotine (NICODERM CQ) 14 MG/24HR 24 hr patch 1 patch  1 patch Transdermal Daily Evelia Grimm,    1 patch at 10/14/24 1543    nicotine (NICODERM CQ) 21 MG/24HR 24 hr patch 1 patch  1 patch Transdermal Daily PRN Britt Kang MD   1 patch at 10/13/24 1611    OLANZapine (zyPREXA) tablet 5 mg  5 mg Oral 4x Daily PRN Evelia Grimm, DO   5 mg at 10/13/24 1348    Or    OLANZapine (zyPREXA) injection 5 mg  5 mg Intramuscular 4x Daily PRN Evelia Grimm, DO   5 mg at 10/13/24 1702    OLANZapine zydis (zyPREXA) ODT tab 10 mg  10 mg Oral BID Evelia Grimm, DO   10 mg at 10/14/24 1906    polyethylene glycol (MIRALAX) Packet 17 g  17 g Oral Daily PRN Nikki Caceres MD        traZODone (DESYREL) half-tab 25 mg  25 mg Oral At Bedtime PRN Evelia Grimm DO   25 mg at 10/14/24 2322    Or    traZODone (DESYREL) tablet 50 mg  50 mg Oral At Bedtime PRN Evelia Grimm DO           Labs and Imaging:  New results:   Recent Results (from the past 24 hour(s))   Albumin level    Collection Time: 10/14/24 12:41 PM   Result Value Ref Range    Albumin 4.3 3.5 - 5.2 g/dL   Calcium    Collection Time: 10/14/24 12:41 PM   Result Value Ref Range    Calcium 11.4 (H) 8.8 - 10.4 mg/dL   Extra Purple Top Tube    Collection Time: 10/14/24 12:41 PM   Result Value Ref Range    Hold Specimen JI        Data this admission:  - CBC unremarkable  - CMP unremarkable  - TSH normal  - UDS negative  - Vit D low  - Hgb A1c 5.9 (10/13/24)  - Lipids unremarkable  - Vit B12 normal  - Folate normal  - Urinalysis unremarkable  - EKG normal sinus rhythm, QTc 390  - Head CT showed no acute changes     Mental Status Exam:     Oriented to:  Grossly Oriented, Person/Self, City, and Year  General:  Awake and Alert  Appearance:  appears stated age  Behavior/Attitude:  accusatory, grandiose, easily distracted, secretive, and suspicious  Eye Contact:  appropriate  Psychomotor: no evidence of tics, dystonia, or  "tardive dyskinesia, agitated, and restless no catatonia present  Speech:  loud volume/tone and talkative  Language: Fluent in English with appropriate syntax and vocabulary.  Mood:  \"I don't ever feel down. Yeah, I'm okay.\"  Affect:  irritable  Thought Process:  looseness of association, thought blocking, disorganized, and confused  Thought Content:  homicidal ideation; delusions of paranoia and delusions of grandiosity  Associations:  loose  Insight:  impaired due to beliefs that he is being followed by realtors  Judgment:  impaired due to recent plan to fire gun at \"mob\" at hotel. Does not recognize that he needs treatment  Impulse control: decreased: loaded gun with HI  Attention Span:  decreased  Concentration: Distractible  Recent and Remote Memory:  not formally assessed  Fund of Knowledge:  Not assessed  Muscle Strength and Tone: normal  Gait and Station: Normal     Psychiatric Assessment     Estevan Aaron is a 64 year old male with previous psychiatric diagnoses of GEORGINA admitted from the ER on 10/12/2024 due to concern for HI and psychosis in the context of medical issues (hyperthyroidism, hypercalcemia), psychosocial stressors including family dynamics with recent possible divorce. This is the patient's first psychiatric hospitalization and possibly contact with psychiatry. Significant symptoms on admission include delusions of persecution with grandiose beliefs, as with secondary homicidal ideations, as well as disorganized thinking and behavior. The MSE on admission was pertinent for a thought process which was perseverative, with rambling, looseness of association, circumstantial, tangential, flight of ideals, disorganized, word salad, and incoherent. Psychological contributions to mental health presentation include absent insight at the moment. Social factors contributing to mental health presentation include a possibly poor support system as well as possible marital conflicts that could be secondary or " triggers to mental health symptoms. Biological contributions to mental health presentation include a history of hyperparathyroidism with chronic hypercalcemia per charts.     Patient was an extremely poor historian due to severe disorganized thinking on interview; he is observed with 1) persecutory delusion pertaining to the realtor and his gang members, 2) disorganized behavior as these delusions have led him to flee to different hotels to stay safe/ has called  complaining of being targeted and 3) auditory hallucinations of voices for the past 12 months. Patient's symptoms of severely disorganized thinking and behavior with delusions fitting a picture of acute to subacute psychosis. Though he has no prior dx of psychosis or past decompensation leading to psychiatric hospitalization, per collateral, in the past has endorsed visual hallucinations 10-15 years ago and in the past has been paranoid/distrusting of others, hence this may not be a first episode psychosis.     Due to patient's prior history of anxiety with possible / speculated comorbid mood components with it and his current treatment with fluoxetine, and considering the patient grossly showed symptoms on interview such as an affect that was irritable, non blunted, and somewhat labile, grandiosity in regards to his profession, flight of ideas, and self-described changes in sleep that has been more poor, primary leading ddx is bipolar I disorder, current manic episode with psychotic features. Additionally, per collateral, he has recently been noted with hyper verbal speech and recently has spent $70,000 on a car in the past month, which is not usual for him. For these reasons, it would be valuable to hold his Fluoxetine at the moment and continue to monitor for symptoms.     Patient's presentation remains unusual in regards to his age, and psychosis/zelalem due to a medical illness such chronic hypercalcemia, his long standing hyperparathyroid disorder,  or any other possible endocrine disorder or syndrome associated with these disorders can be contributing to the patient's symptoms, as case reports in the literature have correlated onsets of psychosis with hypercalcemia or hyperparathyroidism. A prompt management of his hyperparathyroidism might thus be beneficial to patient's mental health presentation.    In summary, the patient's diagnosis is still in evolution. He will likely benefit from further assessment and management this admission. Given that he currently has HI and psychosis, patient warrants inpatient psychiatric hospitalization to maintain his safety.     Psychiatric Plan by Diagnosis      Today's changes:  - Increase Zyprexa 10 mg BID to 10 mg during day and 15 mg at bedtime    - Hold daily Ca2+ and albumin labs due to patient agitation.   - Commitment with Ortega supported by [facility], patient on court hold as of 10/15.      # Bipolar I Disorder, current manic episode with psychotic features    1. Medications:  - Hold PTA fluoxetine 40 mg.  - Zyprexa 10 mg during day and 15 mg at bedtime     2. Pertinent Labs/Monitoring:   - Ca2+ (10/13) 12.5, (10/14) 11.4, held 10/15 due to patient agitation     3. Additional Plans:  - Patient will be treated in therapeutic milieu with appropriate individual and group therapies as described     # Unspecified anxiety vs Generalized Anxiety Disorder  - Monitor for symptoms.  - Fluoxetine held due to suspicion of ongoing manic symptoms.     Psychiatric Hospital Course:      Estevan Aaron was admitted to [facility] on a 72 hour hold.   Medications:  PTA fluoxetine was held due to concern for worsening of zelalem   New medications started at the time of admission include Zyprexa.   Increased Zyprexa 10 mg at bedtime was to 10 mg BID (10/14)   Increased Zyprexa 10 mg BID to 10 mg during day and 15 mg at bedtime (10/15)  The risks, benefits, alternatives, and side effects were discussed and understood by the patient and  other caregivers.     Medical Assessment and Plan     Medical diagnoses to be addressed this admission:    # Hypertension  - Continue PTA medications  Furosemide 40 mg daily  Lisinopril 40 mg daily  Metoprolol 50 mg daily  Amlodipine 10 mg daily  Clonidine 0.1 mg BID     # Hyperlipidemia  - Continue PTA Atorvastatin 40 mg     # Hyperparathyroidism  # Hypercalcemia, hypophosphoremia   Increased Ca level to 10.9 and decreased Ph level to 2.3 in the ED. Suspicion patient's hypercalcemia could be contributing to symptoms of psychosis.  - Consulted endocrinology, who started cinacalcet 30 mg BID on 10/13     Medical course: Patient was physically examined by the ED prior to being transferred to the unit and was found to be medically stable and appropriate for admission.      Consults: Psychiatry, Endocrinology (follow-up of hyperthyroidism / hypercalcemia and hypertension)     Checklist     Legal Status: Court hold     Safety Assessment:   Behavioral Orders   Procedures    Assault precautions    Code 1 - Restrict to Unit    Elopement precautions    Routine Programming     As clinically indicated    Status 15     Every 15 minutes.    Status Individual Observation     2:1 SIO    Patient SIO status reviewed with team/RN.  Please also refer to RN/team documentation for add'l detail.    -SIO staff to monitor following which have contributed to patient being on SIO:  Pt has psychosis regarding being followed and attacked, very paranoid, HI    -Possible interventions SIO staff could use to support patient's treatment progress:  Redirect and reassure  -When following observed, team will review discontinuation of SIO:  Psychosis improves     Order Specific Question:   CONTINUOUS 24 hours / day     Answer:   Other     Order Specific Question:   Specify distance     Answer:   10 feet     Order Specific Question:   Indications for SIO     Answer:   Assault risk     Order Specific Question:   Indications for SIO     Answer:   Severe  intrusiveness       Risk Assessment:  Risk for harm is elevated.  Risk factors: SI, HI, impulsive, and past behaviors  Protective factors: family     SIO: 2:1    Disposition: Pending stabilization, medication optimization, & development of a safe discharge plan. We are in the process of submitting commitment request to [facility].     Attestations     Chandra Castanon, MS3  [employer]    Resident/Fellow Attestation   I, Evelia Grimm DO, was present with the medical/ALLEN student who participated in the service and in the documentation of the note.  I have verified the history and personally performed the physical exam and medical decision making.  I agree with the assessment and plan of care as documented in the note.        Evelia Grimm DO  PGY-1   Date of Service (when I saw the patient): 10/15/24    Attestation:  This patient has been seen and evaluated by me, Pete Smith MD.  I have discussed this patient with the house staff team including the resident and/or medical student and I agree with the findings and plan in this note.    I have reviewed today's vital signs, medications, labs and imaging. Pete Smith MD , PhD.

## 2024-10-15 NOTE — PLAN OF CARE
"This am pt stated he did not want his vitals taken did not want any drugs.  His sister is a RN and she didn't want him to do these.  Pt was redirected and pt allowed to have his vitals taken and took his am medications.  Pt got coffee and stated there was a hair in his cup.  Pt put his fingers in the cup and pulled them out and said there is a hair.  Staff saw not felt any hairs.  During lunch pt appeared to be picking this up in the hair.  Pt was also talking himself. At about 1:45pm pt went to front doors demanding to leave. Pt appeared very agitated.   Pt watching if anyone was trying to come onto unit. Pt declined PRN.  Its almost 3:30pm and pt continuing to demand to leave.  Pt states is being held against his will.  Staff tried to explain being on hold and court process.  Pt did not appear to understand what was being told to him.  Pt remains on 2:1 for assault risk and self intrusiveness.       Problem: Adult Behavioral Health Plan of Care  Goal: Plan of Care Review  Outcome: Not Progressing  Goal: Patient-Specific Goal (Individualization)  Description: You can add care plan individualizations to a care plan. Examples of Individualization might be:  \"Parent requests to be called daily at 9am for status\", \"I have a hard time hearing out of my right ear\", or \"Do not touch me to wake me up as it startles  me\".  Outcome: Not Progressing  Goal: Adheres to Safety Considerations for Self and Others  Outcome: Not Progressing  Intervention: Develop and Maintain Individualized Safety Plan  Recent Flowsheet Documentation  Taken 10/15/2024 1200 by Catherine Szymanski RN  Safety Measures:   environmental rounds completed   safety rounds completed  Goal: Absence of New-Onset Illness or Injury  Outcome: Not Progressing  Intervention: Identify and Manage Fall Risk  Recent Flowsheet Documentation  Taken 10/15/2024 1200 by Catherine Szymanski RN  Safety Measures:   environmental rounds completed   safety rounds completed  Goal: " Optimized Coping Skills in Response to Life Stressors  Outcome: Not Progressing  Goal: Develops/Participates in Therapeutic [place] to Support Successful Transition  Outcome: Not Progressing     Problem: Psychotic Signs/Symptoms  Goal: Improved Behavioral Control (Psychotic Signs/Symptoms)  Outcome: Not Progressing  Goal: Optimal Cognitive Function (Psychotic Signs/Symptoms)  Outcome: Not Progressing  Goal: Increased Participation and Engagement (Psychotic Signs/Symptoms)  Outcome: Not Progressing  Goal: Improved Mood Symptoms (Psychotic Signs/Symptoms)  Outcome: Not Progressing  Goal: Improved Psychomotor Symptoms (Psychotic Signs/Symptoms)  Outcome: Not Progressing  Intervention: Manage Psychomotor Movement  Recent Flowsheet Documentation  Taken 10/15/2024 1200 by Catherine Szymanski RN  Activity (Behavioral Health): up ad diya  Goal: Decreased Sensory Symptoms (Psychotic Signs/Symptoms)  Outcome: Not Progressing  Goal: Improved Sleep (Psychotic Signs/Symptoms)  Outcome: Not Progressing  Goal: Enhanced Social, Occupational or Functional Skills (Psychotic Signs/Symptoms)  Outcome: Not Progressing     Problem: Depression  Goal: Improved Mood  Outcome: Not Progressing     Problem: Suicidal Behavior  Goal: Suicidal Behavior is Absent or Managed  Outcome: Not Progressing     Problem: Anxiety  Goal: Anxiety Reduction or Resolution  Outcome: Not Progressing     Problem: Sleep Disturbance  Goal: Adequate Sleep/Rest  Outcome: Not Progressing     Problem: Seclusion/Restraint, Behavioral  Goal: Seclusion/Behavioral Restraint Goal: Absence of Harm or Injury  Outcome: Not Progressing  Intervention: Implement Least Restrictive Safety Strategies  Recent Flowsheet Documentation  Taken 10/15/2024 1200 by Catherine Szymanski, RN  Safety Measures:   environmental rounds completed   safety rounds completed   Goal Outcome Evaluation:

## 2024-10-15 NOTE — PLAN OF CARE
Problem: Sleep Disturbance  Goal: Adequate Sleep/Rest  Outcome: Progressing   Goal Outcome Evaluation:    Patient appears to have slept a total of 5.25 hours.     Observed lingering around the unit exit doors, trying to leave at start of shift. Patient eventually redirectable back to room and fell asleep by 0045. Remains on 2:1 for assault risk and severe intrusiveness. Safety/environment checks conducted every 15 minutes with no further concerns noted. No complaints of pain/discomfort.

## 2024-10-15 NOTE — PLAN OF CARE
Problem: Adult Behavioral Health Plan of Care  Goal: Plan of Care Review  Outcome: Not Progressing  Flowsheets (Taken 10/14/2024 1630)  Patient Agreement with Plan of Care: agrees   Goal Outcome Evaluation:    Plan of Care Reviewed With: patient          Pt continued on SIO, 2:1 for assault, intrusiveness, and elopement. He was out in the milieu all shift. He was pacing loitering near all the exit doors demanding to leave. He made statements about wanting to leave. He was observed trying handles of doors to see if they will open. Pt needed lots of redirections. Pt is very confused and lacks concentration. He is in [place] and thinks he is not supposed to be hear, as a result, he has been asking to leave. He has no insight of his situation and lacks judgement. At about 5:30 pm, he started demanding to go home. He was yelling, and walking towards the exit door and he pushed it but it did not opened. Staff were able to de-escalate the situation and Pt was redirectable. This happens several time and he was redirected several times but difficult to follow redirections sometimes. He is eating and drinking okay. Ate 100% of his dinner and snacks. Hygiene was poor. Pt encouraged to take a shower but refused. He was cooperative with assessment. He denied pain and all mental health psych symptoms even though he made some few delusional statements. He contracted for safety. He was medication compliant. He was given prn Melatonin 3 mg for insomnia. After taking his bed time medications, he calmed down and was engaged with the SIO staff. He played cards with them and later he went to his room but sat at the door. He took a little nap sitting on the chair. At about 11 pm, pt went back to the exit door demanding to leave. He was redirected but refused to go to his room. The DARREN Team was called and they also tried to redirect pt but he kept insisting to leave. He was offered prn Trazodone 25 mg which he took. Report given to the  night nurse. He is still awake pacing the hallway and loitering around the exit door. Will continue to monitor and will assist if need arise. Pt was moved from N223 to N202 this evening.     Pt has new order for psychiatry Emergency. See order. He was started on Trazodone 25 mg and 50 mg for insomnia. Vitals were WNL.

## 2024-10-16 LAB
ALBUMIN SERPL BCG-MCNC: 4.3 G/DL (ref 3.5–5.2)
CALCIUM SERPL-MCNC: 10.3 MG/DL (ref 8.8–10.4)
HOLD SPECIMEN: NORMAL

## 2024-10-16 PROCEDURE — 250N000013 HC RX MED GY IP 250 OP 250 PS 637

## 2024-10-16 PROCEDURE — 82310 ASSAY OF CALCIUM: CPT

## 2024-10-16 PROCEDURE — 82040 ASSAY OF SERUM ALBUMIN: CPT

## 2024-10-16 PROCEDURE — 99232 SBSQ HOSP IP/OBS MODERATE 35: CPT | Mod: GC | Performed by: PSYCHIATRY & NEUROLOGY

## 2024-10-16 PROCEDURE — 36415 COLL VENOUS BLD VENIPUNCTURE: CPT

## 2024-10-16 PROCEDURE — 124N000002 HC R&B MH UMMC

## 2024-10-16 RX ORDER — OLANZAPINE 20 MG/1
20 TABLET, ORALLY DISINTEGRATING ORAL AT BEDTIME
Status: CANCELLED | OUTPATIENT
Start: 2024-10-16

## 2024-10-16 RX ORDER — CINACALCET 30 MG/1
30 TABLET, FILM COATED ORAL 2 TIMES DAILY
Status: COMPLETED | OUTPATIENT
Start: 2024-10-16 | End: 2024-10-16

## 2024-10-16 RX ADMIN — CLONIDINE HYDROCHLORIDE 0.1 MG: 0.1 TABLET ORAL at 08:43

## 2024-10-16 RX ADMIN — LISINOPRIL 40 MG: 10 TABLET ORAL at 08:37

## 2024-10-16 RX ADMIN — CINACALCET 30 MG: 30 TABLET ORAL at 22:44

## 2024-10-16 RX ADMIN — CLONIDINE HYDROCHLORIDE 0.1 MG: 0.1 TABLET ORAL at 22:43

## 2024-10-16 RX ADMIN — CINACALCET 30 MG: 30 TABLET ORAL at 08:38

## 2024-10-16 RX ADMIN — FUROSEMIDE 40 MG: 40 TABLET ORAL at 08:39

## 2024-10-16 RX ADMIN — OLANZAPINE 10 MG: 10 TABLET, FILM COATED ORAL at 08:37

## 2024-10-16 RX ADMIN — OLANZAPINE 15 MG: 15 TABLET, ORALLY DISINTEGRATING ORAL at 22:44

## 2024-10-16 RX ADMIN — ATORVASTATIN CALCIUM 40 MG: 20 TABLET, FILM COATED ORAL at 08:38

## 2024-10-16 RX ADMIN — METOPROLOL SUCCINATE 50 MG: 50 TABLET, EXTENDED RELEASE ORAL at 08:37

## 2024-10-16 RX ADMIN — AMLODIPINE BESYLATE 10 MG: 5 TABLET ORAL at 08:38

## 2024-10-16 RX ADMIN — Medication 1 PATCH: at 08:45

## 2024-10-16 ASSESSMENT — ACTIVITIES OF DAILY LIVING (ADL)
ADLS_ACUITY_SCORE: 28
DRESS: INDEPENDENT;SCRUBS (BEHAVIORAL HEALTH)
ADLS_ACUITY_SCORE: 28
ORAL_HYGIENE: INDEPENDENT
ADLS_ACUITY_SCORE: 28
HYGIENE/GROOMING: HANDWASHING;INDEPENDENT
ADLS_ACUITY_SCORE: 28

## 2024-10-16 NOTE — PLAN OF CARE
Team Note Due:  Monday    Assessment/Intervention/Current Symtoms and Care Coordination:  Chart review and met with team, discussed pt progress, symptomology, and response to treatment.  Discussed the discharge plan and any potential impediments to discharge.    MD completed treating physician recommendation form for preliminary hearing tomorrow and medication side effects form. Sent to PPS.    Sent email to coordinators regarding upcoming court hearings.    DA completed and returned to PPS.    Discharge Plan or Goal:  TBD - patient has been living out of hotels for the last 2 months but may be able to stay with one of his daughters upon discharge.     Barriers to Discharge:  Patient requires further psychiatric stabilization due to current symptomology, medication management with changes subject to provider, coordination with outside supports, and aftercare planning. Pt is also on 72HH and a petition for MI Commitment/Ortega has been filed.     Referral Status:  None at this time     Legal Status:  Court hold    Petition for MI Commitment and Ortega filed 10/14/24  [facility]: [place]  File Number: [id number]  Start and expiration date of commitment: TBD    Ortega meds requested: Haldol, Prolixin, Clozaril, Risperdal, Invega, Zyprexa, Seroquel, Abilify    Future Court Hearings:  Preliminary Hearing: Thursday 10/17 at 2:30pm  Final Hearing: Thursday 10/24 at TBD    PPS:  Shelby Chowdary: [phone]  [email or web address]    Contacts:  N/A      Upcoming Meetings and Dates/Important Information and next steps:  Follow commitment process  Send Treating Physician Recommendation form to PPS by Wedneday 10/23 at 12pm for final hearing.

## 2024-10-16 NOTE — PLAN OF CARE
"  Problem: Adult Behavioral Health Plan of Care  Goal: Plan of Care Review  10/16/2024 1457 by Timothy Uriostegui RN  Outcome: Not Progressing  Flowsheets (Taken 10/16/2024 1457)  Plan of Care Reviewed With: patient  Overall Patient Progress: no change  Patient Agreement with Plan of Care: agrees with comment (describe)  10/16/2024 1454 by Timothy Uriostegui RN  Outcome: Progressing  Flowsheets (Taken 10/16/2024 1454)  Plan of Care Reviewed With: patient  10/16/2024 1355 by Timothy Uriostegui RN  Outcome: Not Progressing  Flowsheets (Taken 10/16/2024 1355)  Plan of Care Reviewed With: patient  Overall Patient Progress: no change  Patient Agreement with Plan of Care: agrees with comment (describe)  Goal: Patient-Specific Goal (Individualization)  Description: You can add care plan individualizations to a care plan. Examples of Individualization might be:  \"Parent requests to be called daily at 9am for status\", \"I have a hard time hearing out of my right ear\", or \"Do not touch me to wake me up as it startles  me\".  10/16/2024 1457 by Timothy Uriostegui RN  Outcome: Not Progressing  10/16/2024 1454 by Timothy Uriostegui RN  Outcome: Progressing  10/16/2024 1355 by Timothy Uriostegui RN  Outcome: Not Progressing  Goal: Adheres to Safety Considerations for Self and Others  10/16/2024 1457 by Timothy Uriostegui RN  Outcome: Not Progressing  10/16/2024 1454 by Timothy Uriostegui RN  Outcome: Progressing  10/16/2024 1355 by Timothy Uriostegui RN  Outcome: Not Progressing  Goal: Absence of New-Onset Illness or Injury  10/16/2024 1457 by Timothy Uriostegui RN  Outcome: Not Progressing  10/16/2024 1454 by Timothy Uriostegui RN  Outcome: Progressing  10/16/2024 1355 by Timothy Uriostegui RN  Outcome: Not Progressing  Goal: Optimized Coping Skills in Response to Life Stressors  10/16/2024 1457 by Timothy Uriostegui RN  Outcome: Not Progressing  10/16/2024 1454 by Timothy Uriostegui, RN  Outcome: Progressing  10/16/2024 1355 by Timothy Uriostegui, RN  Outcome: Not Progressing  Intervention: " Promote Effective Coping Strategies  Recent Flowsheet Documentation  Taken 10/16/2024 0800 by Timothy Uriostegui RN  Supportive Measures:   verbalization of feelings encouraged   positive reinforcement provided   active listening utilized  Goal: Develops/Participates in Therapeutic [place] to Support Successful Transition  10/16/2024 1457 by Timothy Uriostegui RN  Outcome: Not Progressing  10/16/2024 1454 by Timothy Uriostegui RN  Outcome: Progressing  10/16/2024 1355 by Timothy Uriostegui RN  Outcome: Not Progressing  Intervention: Foster Therapeutic [place]  Recent Flowsheet Documentation  Taken 10/16/2024 0800 by Timothy Uriostegui RN  Trust Relationship/Rapport:   care explained   choices provided   emotional support provided   empathic listening provided   questions answered   questions encouraged   reassurance provided   thoughts/feelings acknowledged     Problem: Psychotic Signs/Symptoms  Goal: Improved Behavioral Control (Psychotic Signs/Symptoms)  10/16/2024 1457 by Timothy Uriostegui RN  Outcome: Not Progressing  10/16/2024 1454 by Timothy Uriostegui RN  Outcome: Progressing  10/16/2024 1355 by Timothy Uriostegui RN  Outcome: Not Progressing  Goal: Optimal Cognitive Function (Psychotic Signs/Symptoms)  10/16/2024 1457 by Timothy Uriostegui RN  Outcome: Not Progressing  10/16/2024 1454 by Timothy Uriostegui RN  Outcome: Progressing  10/16/2024 1355 by Timothy Uriostegui RN  Outcome: Not Progressing  Intervention: Support and Promote Cognitive Ability  Recent Flowsheet Documentation  Taken 10/16/2024 0800 by Timothy Uriostegui RN  Trust Relationship/Rapport:   care explained   choices provided   emotional support provided   empathic listening provided   questions answered   questions encouraged   reassurance provided   thoughts/feelings acknowledged  Goal: Increased Participation and Engagement (Psychotic Signs/Symptoms)  10/16/2024 1457 by Timothy Uriostegui RN  Outcome: Not Progressing  10/16/2024 1454 by Timothy Uriostegui RN  Outcome:  Progressing  10/16/2024 1355 by Timothy Uriostegui RN  Outcome: Not Progressing  Intervention: Facilitate Participation and Engagement  Recent Flowsheet Documentation  Taken 10/16/2024 0800 by Timothy Uriostegui RN  Supportive Measures:   verbalization of feelings encouraged   positive reinforcement provided   active listening utilized  Goal: Improved Mood Symptoms (Psychotic Signs/Symptoms)  10/16/2024 1457 by Timothy Uriostegui RN  Outcome: Not Progressing  10/16/2024 1454 by Timothy Uriostegui RN  Outcome: Progressing  10/16/2024 1355 by Timothy Uriostegui RN  Outcome: Not Progressing  Intervention: Optimize Emotion and Mood  Recent Flowsheet Documentation  Taken 10/16/2024 0800 by Timothy Uriostegui RN  Supportive Measures:   verbalization of feelings encouraged   positive reinforcement provided   active listening utilized  Goal: Improved Psychomotor Symptoms (Psychotic Signs/Symptoms)  10/16/2024 1457 by Timothy Uriostegui RN  Outcome: Not Progressing  10/16/2024 1454 by Timothy Uriostegui RN  Outcome: Progressing  10/16/2024 1355 by Timothy Uriostegui RN  Outcome: Not Progressing  Goal: Decreased Sensory Symptoms (Psychotic Signs/Symptoms)  10/16/2024 1457 by Timothy Uriostegui RN  Outcome: Not Progressing  10/16/2024 1454 by Timothy Uriosetgui RN  Outcome: Progressing  10/16/2024 1355 by Timothy Uriostegui RN  Outcome: Not Progressing  Intervention: Minimize and Manage Sensory Impairment  Recent Flowsheet Documentation  Taken 10/16/2024 0800 by Timothy Uriostegui RN  Sensory Stimulation Regulation: quiet environment promoted  Goal: Improved Sleep (Psychotic Signs/Symptoms)  10/16/2024 1457 by Timothy Uriostegui RN  Outcome: Not Progressing  10/16/2024 1454 by Timothy Uriostegui RN  Outcome: Progressing  10/16/2024 1355 by Timothy Uriostegui RN  Outcome: Not Progressing  Goal: Enhanced Social, Occupational or Functional Skills (Psychotic Signs/Symptoms)  10/16/2024 1457 by Timothy Uriostegui RN  Outcome: Not Progressing  10/16/2024 1454 by Timothy Uriostegui RN  Outcome:  Progressing  10/16/2024 1355 by Timothy Uriostegui RN  Outcome: Not Progressing  Intervention: Promote Social, Occupational and Functional Ability  Recent Flowsheet Documentation  Taken 10/16/2024 0800 by Timothy Uriostegui RN  Social Functional Ability Promotion:   opportunity for activity provided   self-expression encouraged   social interaction promoted  Trust Relationship/Rapport:   care explained   choices provided   emotional support provided   empathic listening provided   questions answered   questions encouraged   reassurance provided   thoughts/feelings acknowledged     Problem: Depression  Goal: Improved Mood  10/16/2024 1457 by Timothy Uriostegui RN  Outcome: Not Progressing  10/16/2024 1454 by Timothy Uriostegui RN  Outcome: Progressing  10/16/2024 1355 by Timothy Uriostegui RN  Outcome: Not Progressing  Intervention: Monitor and Manage Depressive Symptoms  Recent Flowsheet Documentation  Taken 10/16/2024 0800 by Timothy Urisotegui RN  Supportive Measures:   verbalization of feelings encouraged   positive reinforcement provided   active listening utilized  Family/Support System Care:   involvement promoted   presence promoted   self-care encouraged   support provided     Problem: Suicidal Behavior  Goal: Suicidal Behavior is Absent or Managed  10/16/2024 1457 by Timothy Uriostegui RN  Outcome: Not Progressing  10/16/2024 1454 by Timothy Uriostegui RN  Outcome: Progressing  10/16/2024 1355 by Timothy Uriostegui RN  Outcome: Not Progressing  Intervention: Provide Immediate and Ongoing Protective Physical Environment  Recent Flowsheet Documentation  Taken 10/16/2024 0800 by Timothy Uriostegui RN  Safe Transition Promotion: protective factors promoted     Problem: Anxiety  Goal: Anxiety Reduction or Resolution  10/16/2024 1457 by Timothy Uriostegui RN  Outcome: Not Progressing  10/16/2024 1454 by Timothy Uriostegui RN  Outcome: Progressing  10/16/2024 1355 by Timothy Uriostegui RN  Outcome: Not Progressing  Intervention: Promote Anxiety Reduction  Recent  Flowsheet Documentation  Taken 10/16/2024 0800 by Timothy Uriostegui RN  Supportive Measures:   verbalization of feelings encouraged   positive reinforcement provided   active listening utilized  Family/Support System Care:   involvement promoted   presence promoted   self-care encouraged   support provided     Problem: Sleep Disturbance  Goal: Adequate Sleep/Rest  10/16/2024 1457 by Timothy Uriostegui RN  Outcome: Not Progressing  10/16/2024 1454 by Timothy Uriostegui RN  Outcome: Progressing  10/16/2024 1355 by Timothy Uriostegui RN  Outcome: Not Progressing  Intervention: Promote Sleep/Rest  Recent Flowsheet Documentation  Taken 10/16/2024 0800 by Timothy Uriostegui RN  Sleep/Rest Enhancement: regular sleep/rest pattern promoted     Problem: Seclusion/Restraint, Behavioral  Goal: Seclusion/Behavioral Restraint Goal: Absence of Harm or Injury  10/16/2024 1457 by Timothy Uriostegui RN  Outcome: Not Progressing  10/16/2024 1454 by Timothy Uriostegui RN  Outcome: Progressing  10/16/2024 1355 by Timothy Uriostegui RN  Outcome: Not Progressing  Intervention: Protect Dignity, Rights, and Personal Wellbeing  Recent Flowsheet Documentation  Taken 10/16/2024 0800 by Timothy Uriostegui RN  Trust Relationship/Rapport:   care explained   choices provided   emotional support provided   empathic listening provided   questions answered   questions encouraged   reassurance provided   thoughts/feelings acknowledged  Intervention: Protect Skin and Joint Integrity  Recent Flowsheet Documentation  Taken 10/16/2024 0800 by Timothy Uriostegui RN  Body Position: position changed independently   Goal Outcome Evaluation:      Plan of Care Reviewed With: patient    Overall Patient Progress: no change  Patient has been visible in the milieu.Still SIO 2:1.Patient took all his morning medication without issues.Patient refused prn Gabapentin that was offered to him.Patient is disorganized and he demands to leave.Patient stated he was being held here against his will.Patient became  argumentative when he was re-directed.He told the SIO staff when he is discharged he will come with guns and kill the staff here,message send to attending doctor.Patient has not escalated behaviors.Patient denies all psych symptoms.Patient is in denial of his illness.Patient has no insight & no judgement.Affect is flat and blunted.Mood is anxious.Patient  has flight of ideas.In the afternoon,patient was encouraged to color and this seemed to calm him down.Patient spend most of the time in the dining room without acting out or asking to leave.  Temp: 97.5  F (36.4  C) Temp src: Oral BP: 112/73 Pulse: 68   Resp: 16 SpO2: 96 % O2 Device: None (Room air)

## 2024-10-16 NOTE — PLAN OF CARE
Problem: Sleep Disturbance  Goal: Adequate Sleep/Rest  Outcome: Progressing   Goal Outcome Evaluation:    Patient appears to have slept a total of 5.25 hours. Safety/environment checks conducted every 15 minutes with no concerns noted. No complaints of pain/discomfort.      Continues on a 2:1 for severe intrusiveness and assault risk. Also has acuity order due to high elopement risk.

## 2024-10-16 NOTE — PLAN OF CARE
BEH IP Unit Acuity Rating Score (UARS)  Patient is given one point for every criteria they meet.    CRITERIA SCORING   On a 72 hour hold, court hold, committed, stay of commitment, or revocation. 1    Patient LOS on BEH unit exceeds 20 days. 0  LOS: 4   Patient under guardianship, 55+, otherwise medically complex, or under age 11. 1   Suicide ideation without relief of precipitating factors. 0   Current plan for suicide. 0   Current plan for homicide. 0   Imminent risk or actual attempt to seriously harm another without relief of factors precipitating the attempt. 1   Severe dysfunction in daily living (ex: complete neglect for self care, extreme disruption in vegetative function, extreme deterioration in social interactions). 1   Recent (last 7 days) or current physical aggression in the ED or on unit. 0   Restraints or seclusion episode in past 72 hours. 0   Recent (last 7 days) or current verbal aggression, agitation, yelling, etc., while in the ED or unit. 1 - last 10/14   Active psychosis. 1   Need for constant or near constant redirection (from leaving, from others, etc).  1   Intrusive or disruptive behaviors. 1   Patient requires 3 or more hours of individualized nursing care per 8-hour shift (i.e. for ADLs, meds, therapeutic interventions). 1   TOTAL 9

## 2024-10-16 NOTE — PROGRESS NOTES
"  ----------------------------------------------------------------------------------------------------------  [facility]  Psychiatry Progress Note  Hospital Day #4     Interim History:     The patient's care was discussed with the treatment team and chart notes were reviewed.    Vitals: VSS  Sleep: 5.25 hours (10/16/24 0600)  Scheduled medications: Took all scheduled medications as prescribed  Psychiatric PRN medications: Trazodone 25 mg at bedtime     Staff Report:   -Patient stands by exit doors with his belongings in a bag, demanding to leave  -DARREN team present while he took medications, took several attempts for patient to take his medications   -Attempted to enter staff break room    Please see staff note for details      Subjective:     Patient Interview:  Estevan Aaron was interviewed in his room. Reports he is doing \"fine\" today. When asked about his mood he says \"I could have left 12 hours ago but they messed it up.\" Tells this writer he is in [place] on floor 15 of [facility]. States he needs to get out of here and then accuses new team member of \"ruining everything, \" saying \"I'm gonna make you pay.\" Tells this writer \"I'm not going to hurt anybody, I never have.\" Reports \"I feel I've been mistreated\" and shows staff his armpit which appears normal. Reports no pain in that region.     Upon informing him we may increase his evening Zyprexa he says \"No. No, no, no. I don't want any drugs. That's what you do, you get them all drugged up, I've seen it.\" Expressed frustration regarding his preliminary hearing because he says he's never hit anyone and doesn't see the need for the hearing. Tells this writer \"What a joke. You are all jokes.\" Repeats stories about being at the hotel. Upon inquiring what we can do for him he says \"not drugs. You turn them into zombies.\" Reports I need to get my [place] and get it out of there [motions to window]. \"I'm gonna go to " "[place] and build a cabin. Get away from you- whoever you are.\" Tells staff \"you're an idiot. It's nothing mental.\" Tells this writer about people outside the hotel \"Mexicans\" and the  not doing everything. Tells this writer \"I'm gonna break laws tonight if I don't get my phone.\" Raises his voice upon being told we cannot give him his phone. Upon being told we won't unlock the doors he says \"you'll never guess. The plates are... the DMV can't keep up.\" Tells more stories about the  and having guns on him.     ROS:  Negative except for above.      Objective:     Vitals:  /77 (BP Location: Right arm, Patient Position: Sitting, Cuff Size: Adult Large)   Pulse 62   Temp 97.5  F (36.4  C) (Oral)   Resp 16   Wt 105.7 kg (233 lb)   SpO2 96%   BMI 37.61 kg/m      Allergies:  No Known Allergies    Current Medications:  Scheduled:  Current Facility-Administered Medications   Medication Dose Route Frequency Provider Last Rate Last Admin    acetaminophen (TYLENOL) tablet 650 mg  650 mg Oral Q4H PRN Nikki Caceres MD        alum & mag hydroxide-simethicone (MAALOX) suspension 30 mL  30 mL Oral Q4H PRN Nikki Caceres MD        amLODIPine (NORVASC) tablet 10 mg  10 mg Oral Daily Nikki Caceres MD   10 mg at 10/15/24 0830    atorvastatin (LIPITOR) tablet 40 mg  40 mg Oral Daily Nikki Caceres MD   40 mg at 10/15/24 0830    cholecalciferol (VITAMIN D3) capsule 1,250 mcg  1,250 mcg Oral Q7 Days Evelia Grimm DO   1,250 mcg at 10/15/24 1252    cinacalcet (SENSIPAR) tablet 30 mg  30 mg Oral BID Britt Kang MD   30 mg at 10/15/24 2108    cloNIDine (CATAPRES) tablet 0.1 mg  0.1 mg Oral BID Nikki Caceres MD   0.1 mg at 10/15/24 2108    [Held by provider] FLUoxetine (PROzac) capsule 40 mg  40 mg Oral Daily Nikki Caceres MD        furosemide (LASIX) tablet 40 mg  40 mg Oral Daily Nikki Caceres MD   40 mg at 10/15/24 0830    gabapentin (NEURONTIN) capsule 100 mg  100 mg Oral Q6H " PRN Nikki Caceres MD        lisinopril (ZESTRIL) tablet 40 mg  40 mg Oral Daily Nikki Caceres MD   40 mg at 10/15/24 0830    metoprolol succinate ER (TOPROL XL) 24 hr tablet 50 mg  50 mg Oral Daily Nikki Caceres MD   50 mg at 10/15/24 0830    nicotine (NICODERM CQ) 14 MG/24HR 24 hr patch 1 patch  1 patch Transdermal Daily Evelia Grimm DO   1 patch at 10/15/24 0837    nicotine (NICODERM CQ) 21 MG/24HR 24 hr patch 1 patch  1 patch Transdermal Daily PRN Britt Kang MD   1 patch at 10/13/24 1611    OLANZapine (zyPREXA) tablet 5 mg  5 mg Oral TID PRN Evelia Grimm DO        Or    OLANZapine (zyPREXA) injection 5 mg  5 mg Intramuscular TID PRN Evelia Grimm DO        OLANZapine (zyPREXA) tablet 10 mg  10 mg Oral QAM Evelia Grimm DO        OLANZapine zydis (zyPREXA) ODT tab 15 mg  15 mg Oral At Bedtime Evelia Grimm DO   15 mg at 10/15/24 2108    polyethylene glycol (MIRALAX) Packet 17 g  17 g Oral Daily PRN Nikki Caceres MD        traZODone (DESYREL) half-tab 25 mg  25 mg Oral At Bedtime PRN Evelia Grimm DO   25 mg at 10/15/24 2108    Or    traZODone (DESYREL) tablet 50 mg  50 mg Oral At Bedtime PRN Evelia Grimm DO         PRN:  Current Facility-Administered Medications   Medication Dose Route Frequency Provider Last Rate Last Admin    acetaminophen (TYLENOL) tablet 650 mg  650 mg Oral Q4H PRN Nikki Caceres MD        alum & mag hydroxide-simethicone (MAALOX) suspension 30 mL  30 mL Oral Q4H PRN Nikki Caceres MD        amLODIPine (NORVASC) tablet 10 mg  10 mg Oral Daily Nikki Caceres MD   10 mg at 10/15/24 0830    atorvastatin (LIPITOR) tablet 40 mg  40 mg Oral Daily Nikki Caceres MD   40 mg at 10/15/24 0830    cholecalciferol (VITAMIN D3) capsule 1,250 mcg  1,250 mcg Oral Q7 Days Evelia Grimm DO   1,250 mcg at 10/15/24 1252    cinacalcet (SENSIPAR) tablet 30 mg  30 mg Oral BID Britt Kang MD   30 mg at 10/15/24 2108    cloNIDine (CATAPRES) tablet 0.1 mg  0.1 mg Oral BID  Nikki Caceres MD   0.1 mg at 10/15/24 2108    [Held by provider] FLUoxetine (PROzac) capsule 40 mg  40 mg Oral Daily Nikki Caceres MD        furosemide (LASIX) tablet 40 mg  40 mg Oral Daily Nikki Caceres MD   40 mg at 10/15/24 0830    gabapentin (NEURONTIN) capsule 100 mg  100 mg Oral Q6H PRN Nikki Caceres MD        lisinopril (ZESTRIL) tablet 40 mg  40 mg Oral Daily Nikki Caceres MD   40 mg at 10/15/24 0830    metoprolol succinate ER (TOPROL XL) 24 hr tablet 50 mg  50 mg Oral Daily Nikki Caceres MD   50 mg at 10/15/24 0830    nicotine (NICODERM CQ) 14 MG/24HR 24 hr patch 1 patch  1 patch Transdermal Daily Evelia Grimm DO   1 patch at 10/15/24 0837    nicotine (NICODERM CQ) 21 MG/24HR 24 hr patch 1 patch  1 patch Transdermal Daily PRN Britt Kang MD   1 patch at 10/13/24 1611    OLANZapine (zyPREXA) tablet 5 mg  5 mg Oral TID PRN Evelia Grimm DO        Or    OLANZapine (zyPREXA) injection 5 mg  5 mg Intramuscular TID PRN Evelia Grimm DO        OLANZapine (zyPREXA) tablet 10 mg  10 mg Oral QAM Evelia Grimm DO        OLANZapine zydis (zyPREXA) ODT tab 15 mg  15 mg Oral At Bedtime Evelia Grimm DO   15 mg at 10/15/24 2108    polyethylene glycol (MIRALAX) Packet 17 g  17 g Oral Daily PRN Nikki Caceres MD        traZODone (DESYREL) half-tab 25 mg  25 mg Oral At Bedtime PRN Evelia Grimm DO   25 mg at 10/15/24 2108    Or    traZODone (DESYREL) tablet 50 mg  50 mg Oral At Bedtime PRN Evelia Grimm DO         Labs and Imaging:  Data this admission:  - CBC unremarkable  - CMP unremarkable  - TSH normal  - UDS negative  - Vit D low  - Hgb A1c 5.9 (10/13/24)  - Lipids unremarkable  - Vit B12 normal  - Folate normal  - Urinalysis unremarkable  - EKG normal sinus rhythm, QTc 390  - Head CT showed no acute changes    Parathyroid hormone:   10/13: 85    Calcium:  10/14: 11.4    Albumin:  10/14: 4.3     Mental Status Exam:     Oriented to:   Not oriented to situation or city, knew he was in a  "hospital  General:  Awake and Alert  Appearance:  appears stated age  Behavior/Attitude:  accusatory, grandiose, easily distracted, secretive, and suspicious  Eye Contact:  appropriate  Psychomotor: no evidence of tics, dystonia, or tardive dyskinesia, agitated, and restless no catatonia present  Speech:  loud volume/tone and talkative  Language: Fluent in English with appropriate syntax and vocabulary.  Mood:  \"fine\"  Affect:  angry, hostile, and irritable  Thought Process:  ruminative, looseness of association, thought blocking, disorganized, and confused  Thought Content:   denies HI ; delusions of paranoia and delusions of grandiosity  Associations:  loose  Insight:  impaired due to beliefs that he is being followed by realtors  Judgment:  impaired due to recent plan to fire gun at \"mob\" at hotel. Does not recognize that he needs treatment  Impulse control: decreased: loaded gun with HI  Attention Span:  decreased  Concentration: Distractible  Recent and Remote Memory:  not formally assessed  Fund of Knowledge:  Not assessed  Muscle Strength and Tone: normal  Gait and Station: Normal     Psychiatric Assessment     Estevan Aaron is a 64 year old male with previous psychiatric diagnoses of GEORGINA admitted from the ER on 10/12/2024 due to concern for HI and psychosis in the context of medical issues (hyperthyroidism, hypercalcemia), psychosocial stressors including family dynamics with recent possible divorce. This is the patient's first psychiatric hospitalization and possibly contact with psychiatry. Significant symptoms on admission included delusions of persecution with grandiose beliefs, homicidal ideations, as well as disorganized thinking and behavior. The MSE on admission was pertinent for a thought process which was perseverative, with rambling, looseness of association, circumstantial, tangential, flight of ideals, disorganized, word salad, and incoherent. Psychological contributions to mental health " presentation included absent insight at the moment. Social factors contributing to mental health presentation included a possibly poor support system as well as possible marital conflicts that could be secondary or triggers to mental health symptoms. Biological contributions to mental health presentation included a history of hyperparathyroidism with chronic hypercalcemia per charts.     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 Patient's symptoms of severely disorganized thinking and behavior with delusions fit with a picture of acute to subacute psychosis. Though he has no prior dx of psychosis or past decompensation leading to psychiatric hospitalization, per collateral, in the past has endorsed visual hallucinations 10-15 years ago and in the past has been paranoid/distrusting of others, hence this may not be a first episode psychosis.     Due to patient's prior history of anxiety with possible / speculated comorbid mood components with it and his current treatment with fluoxetine, and considering the patient grossly showed symptoms on interview such as an affect that was irritable, non blunted, and somewhat labile, grandiosity in regards to his profession, flight of ideas, and self-described changes in sleep that has been more poor, primary leading ddx is bipolar I disorder, current manic episode with psychotic features. Additionally, per collateral, he has recently been noted with hyper verbal speech and recently has spent $70,000 on a car in the past month, which is not usual for him. For these reasons, it would be valuable to hold his Fluoxetine at the moment and  continue to monitor for symptoms.     Patient's presentation remains unusual in regards to his age, and psychosis/zelalem due to a medical illness such chronic hypercalcemia, his long standing hyperparathyroid disorder, or any other possible endocrine disorder or syndrome associated with these disorders can be contributing to the patient's symptoms, as case reports in the literature have correlated onsets of psychosis with hypercalcemia or hyperparathyroidism. A prompt management of his hyperparathyroidism might thus be beneficial to patient's mental health presentation.    In summary, the patient's diagnosis is still in evolution. He will likely benefit from further assessment and management this admission. Given that he currently has HI and psychosis, patient warrants inpatient psychiatric hospitalization to maintain his safety.     Psychiatric Plan by Diagnosis      Today's changes:  - None     # Bipolar I Disorder, current manic episode with psychotic features    1. Medications:  - Hold PTA fluoxetine 40 mg.  - Zyprexa 10 mg during day and 15 mg at bedtime     2. Pertinent Labs/Monitoring:   - Ca2+ (10/13) 12.5, (10/14) 11.4, held 10/15 due to patient agitation     3. Additional Plans:  - Patient will be treated in therapeutic milieu with appropriate individual and group therapies as described     # Unspecified anxiety vs Generalized Anxiety Disorder  - Monitor for symptoms.  - Fluoxetine held due to suspicion of ongoing manic symptoms.     Psychiatric Hospital Course:      Estevan Aaron was admitted to [facility] on a 72 hour hold.   Medications:  PTA fluoxetine was held due to concern for worsening of zelalem   New medications started at the time of admission include Zyprexa.   Increased Zyprexa 10 mg at bedtime was to 10 mg BID (10/14)   Increased Zyprexa 10 mg BID to 10 mg during day and 15 mg at bedtime (10/15)  The risks, benefits, alternatives, and side effects were discussed and understood by the patient and  other caregivers.     Medical Assessment and Plan     Medical diagnoses to be addressed this admission:    # Hypertension  - Continue PTA medications  Furosemide 40 mg daily  Lisinopril 40 mg daily  Metoprolol 50 mg daily  Amlodipine 10 mg daily  Clonidine 0.1 mg BID     # Hyperlipidemia  - Continue PTA Atorvastatin 40 mg     # Hyperparathyroidism  # Hypercalcemia, hypophosphoremia   Increased Ca level to 10.9 and decreased Ph level to 2.3 in the ED. Suspicion patient's hypercalcemia could be contributing to symptoms of psychosis.  - Consulted endocrinology, who started cinacalcet 30 mg BID on 10/13  - 10/16 endocrinology recommended continuing to trend calcium and albumin to make sure patient does not become hypocalcemic     Medical course: Patient was physically examined by the ED prior to being transferred to the unit and was found to be medically stable and appropriate for admission.      Consults: Psychiatry, Endocrinology (follow-up of hyperthyroidism / hypercalcemia and hypertension)     Checklist     Legal Status: Court hold     Safety Assessment:   Behavioral Orders   Procedures    Assault precautions    Code 1 - Restrict to Unit    Elopement precautions    Routine Programming     As clinically indicated    Status 15     Every 15 minutes.    Status Individual Observation     2:1 SIO    Patient SIO status reviewed with team/RN.  Please also refer to RN/team documentation for add'l detail.    -SIO staff to monitor following which have contributed to patient being on SIO:  Pt has psychosis regarding being followed and attacked, very paranoid, HI    -Possible interventions SIO staff could use to support patient's treatment progress:  Redirect and reassure  -When following observed, team will review discontinuation of SIO:  Psychosis improves     Order Specific Question:   CONTINUOUS 24 hours / day     Answer:   Other     Order Specific Question:   Specify distance     Answer:   10 feet     Order Specific  Question:   Indications for SIO     Answer:   Assault risk     Order Specific Question:   Indications for SIO     Answer:   Severe intrusiveness     Risk Assessment:  Risk for harm is elevated.  Risk factors: SI, HI, impulsive, and past behaviors  Protective factors: family     SIO: 2:1    Disposition: Pending stabilization, medication optimization, & development of a safe discharge plan. We are in the process of submitting commitment request to [facility].     Attestations     Rebeca Saucedo, MS3  [employer]    Resident/Fellow Attestation   I, Presley Barrera MD, was present with the medical/ALLEN student who participated in the service and in the documentation of the note.  I have verified the history and personally performed the physical exam and medical decision making.  I agree with the assessment and plan of care as documented in the note.      Presley Barrera MD  PGY-1   Date of Service (when I saw the patient): 10/16/2024    Attestation:  This patient has been seen and evaluated by me, Pete Smith MD.  I have discussed this patient with the house staff team including the resident and/or medical student and I agree with the findings and plan in this note.    I have reviewed today's vital signs, medications, labs and imaging. Pete Smith MD , PhD.

## 2024-10-16 NOTE — PLAN OF CARE
"Problem: Adult Behavioral Health Plan of Care  Goal: Plan of Care Review  Outcome: Not Progressing  Flowsheets (Taken 10/15/2024 1955)  Patient Agreement with Plan of Care: agrees   Goal Outcome Evaluation:    Plan of Care Reviewed With: patient          Pt continued on SIO, 2:1 for assault, intrusiveness, and elopement. Pt was out in the milieu all shift. He was intermittently visible in lounge watching TV with peers. Pt continue to loiter near all the exit doors caring a paper bag with his belongings in it demanding to leave. He made statements about wanting to leave. He tried handles of doors to see if they will open. Pt was very confused and lacks concentration. He was redirected several times away from the exit door.     Pt is in denier of his situation and thinks he is held out of his will. Pt made statements \"I am not supposed to be hear, I have business to do, I have to go  my truck.\" As a result of all these, he continued to demand to leave which required lots of redirection. Pt has no insight of his situation and lacks any judgement. His appetite is good. He is eating and drinking okay. Ate 100% of his dinner and snacks. Hygiene was poor. Pt encouraged to take a shower but refused.     He was cooperative with assessment. He denied pain and all mental health psych symptoms. He denied hearing voices or seeing things which were not real. His thoughts were very disorganized with flight of ideas. He contracted for safety. He was medication compliant after several attempts. The DARREN team was called and present before he took his medications. He was calm after taking his bed time medication and was observed playing cards with his SIO staff.    He was very argumentative. He is also still very intrusive. He attempted eloping into the staff break room when a staff opened the break room door. He was redirected out of the break room by the staff and the SIO staff who were present. The staff were told to take " precaution when leaving or entering the unit, break room, or kitchen. His vitals were WNL. He was also noted playing with the electrical outlet in the dinning room. The SIO staff redirected him and it was covered with a paper tape.     His legal status was changed to court hold. He was given prn Trazodone 25 mg for insomnia. No medication side effect noted or reported this shift. He attended community meeting at 4 pm but did not participate. He did not attend OT evening group. Pt spoke with one of his daughter on the phone. Will continue to monitor and will assist if need arise.

## 2024-10-16 NOTE — PROGRESS NOTES
Endocrine Progress Note  Patient: Estevan Aaron   MRN: [id number]  Date of Service: 10/14/2024    Assessment and plan:  Estevan Aaron is a 64 year old  male who was hospitalized on the inpatient psychiatry service: He was admitted from the ER on 10/12/2024 due to concern for homicidal ideation and psychosis.         Plan:   1. Primary Hyperparathyroidism: The patient has a history of mild to moderate hypercalcemia, along with elevated parathyroid hormone levels and upper-normal 24-hour urine calcium excretion, indicating primary hyperparathyroidism. A review of his records in our EHR shows no prior use of lithium. It was given 2 doses on 10/12/24, 11AM, 8 PM and Today in the AM was given. Scheduled today at 8PM. Calcium today is 11.4, will continue monitoring.       2. Psychosis: While hypercalcemia can sometimes cause altered mental status or psychosis, it s unclear if this is the cause in this patient. Longstanding mild to moderate hypercalcemia (present since 2015) is generally well-tolerated. However, treatment for primary hyperparathyroidism is advised due to the patient's mental health changes, hypercalcemia severity, and history of kidney stones. I'm unsure if the patient is able to consent to immediate intervention, such as surgery. Cinacalcet could be an option to manage calcium levels in the short term. Yesterday psychosis got worse.      3. History of Kidney Stones     4. Vitamin D Deficiency: Noted in April 2019.18 (10/13/24)     Plan:   - Get calcium and albumin, today. It was communicated with primary team about the importance of calcium monitoring when patient is on Cinacalcet medication. He already received 4 days twice a day. No labs since 10/14/2024.   - DEXA scan, outpatient.   - Endocrinology will follow up    Erica Alvarez MD   Endocrinology Fellow   Page # 7675  On Vocera    Case was discussed and reviewed with Endocrinology Attending Dr. David Hull

## 2024-10-17 LAB
ALBUMIN SERPL BCG-MCNC: 4.1 G/DL (ref 3.5–5.2)
CALCIUM SERPL-MCNC: 10.3 MG/DL (ref 8.8–10.4)

## 2024-10-17 PROCEDURE — 124N000002 HC R&B MH UMMC

## 2024-10-17 PROCEDURE — 250N000013 HC RX MED GY IP 250 OP 250 PS 637

## 2024-10-17 PROCEDURE — 36415 COLL VENOUS BLD VENIPUNCTURE: CPT

## 2024-10-17 PROCEDURE — 99232 SBSQ HOSP IP/OBS MODERATE 35: CPT | Mod: GC | Performed by: PSYCHIATRY & NEUROLOGY

## 2024-10-17 PROCEDURE — 82040 ASSAY OF SERUM ALBUMIN: CPT

## 2024-10-17 PROCEDURE — 82310 ASSAY OF CALCIUM: CPT

## 2024-10-17 RX ORDER — OLANZAPINE 20 MG/1
20 TABLET, ORALLY DISINTEGRATING ORAL AT BEDTIME
Status: CANCELLED | OUTPATIENT
Start: 2024-10-17

## 2024-10-17 RX ORDER — CINACALCET 30 MG/1
30 TABLET, FILM COATED ORAL DAILY
Status: DISPENSED | OUTPATIENT
Start: 2024-10-17

## 2024-10-17 RX ORDER — CINACALCET 30 MG/1
30 TABLET, FILM COATED ORAL 2 TIMES DAILY
Status: DISCONTINUED | OUTPATIENT
Start: 2024-10-17 | End: 2024-10-17

## 2024-10-17 RX ADMIN — OLANZAPINE 10 MG: 10 TABLET, FILM COATED ORAL at 08:36

## 2024-10-17 RX ADMIN — CLONIDINE HYDROCHLORIDE 0.1 MG: 0.1 TABLET ORAL at 19:20

## 2024-10-17 RX ADMIN — OLANZAPINE 15 MG: 15 TABLET, ORALLY DISINTEGRATING ORAL at 19:03

## 2024-10-17 RX ADMIN — ACETAMINOPHEN 650 MG: 325 TABLET, FILM COATED ORAL at 08:37

## 2024-10-17 RX ADMIN — FUROSEMIDE 40 MG: 40 TABLET ORAL at 08:36

## 2024-10-17 RX ADMIN — LISINOPRIL 40 MG: 10 TABLET ORAL at 08:35

## 2024-10-17 RX ADMIN — Medication 1 PATCH: at 08:39

## 2024-10-17 RX ADMIN — CINACALCET 30 MG: 30 TABLET ORAL at 13:24

## 2024-10-17 RX ADMIN — AMLODIPINE BESYLATE 10 MG: 5 TABLET ORAL at 08:34

## 2024-10-17 RX ADMIN — CLONIDINE HYDROCHLORIDE 0.1 MG: 0.1 TABLET ORAL at 08:36

## 2024-10-17 RX ADMIN — ALUMINUM HYDROXIDE, MAGNESIUM HYDROXIDE, AND SIMETHICONE 30 ML: 1200; 120; 1200 SUSPENSION ORAL at 08:37

## 2024-10-17 RX ADMIN — METOPROLOL SUCCINATE 50 MG: 50 TABLET, EXTENDED RELEASE ORAL at 08:36

## 2024-10-17 RX ADMIN — ATORVASTATIN CALCIUM 40 MG: 20 TABLET, FILM COATED ORAL at 08:36

## 2024-10-17 ASSESSMENT — ACTIVITIES OF DAILY LIVING (ADL)
ADLS_ACUITY_SCORE: 28
ORAL_HYGIENE: INDEPENDENT
ADLS_ACUITY_SCORE: 28
DRESS: INDEPENDENT
ADLS_ACUITY_SCORE: 28
HYGIENE/GROOMING: HANDWASHING;INDEPENDENT
ADLS_ACUITY_SCORE: 28

## 2024-10-17 NOTE — PROGRESS NOTES
Endocrine Progress Note  Patient: Estevan Aaron   MRN: [id number]  Date of Service: 10/17/2024    Patient has received cinacalcet 30 mg twice day for the past few days. Calcium is trending down to 10.3 yesterday.        Lab reviewed   Latest Reference Range & Units 10/13/24 08:05 10/14/24 12:41 10/16/24 14:15 10/17/24 08:09   Calcium 8.8 - 10.4 mg/dL 12.5 (H) 11.4 (H) 10.3 10.3   Albumin 3.5 - 5.2 g/dL 4.6 4.3 4.3 4.1   (H): Data is abnormally high    Assessment and plan:  Estevan Aaron is a 64 year old  male who was hospitalized on the inpatient psychiatry service: He was admitted from the ER on 10/12/2024 due to concern for homicidal ideation and psychosis. Endocrinology is consulted due to hypercalcemia and hyperparathyroidism     1. Primary Hyperparathyroidism: The patient has a history of mild to moderate hypercalcemia, along with elevated parathyroid hormone levels and upper-normal 24-hour urine calcium excretion, indicating primary hyperparathyroidism. A review of his records in our EHR shows no prior use of lithium.   - calcium trending down responded well with cinacalcet.  - recommend to reduce cinacalcet to 30 mg daily and recheck calcium on Saturday     2. Psychosis: While hypercalcemia can sometimes cause altered mental status or psychosis, it s unclear if this is the cause in this patient. Longstanding mild to moderate hypercalcemia (present since 2015) is generally well-tolerated. However, treatment for primary hyperparathyroidism is advised due to the patient's mental health changes, hypercalcemia severity, and history of kidney stones. Unclear if the patient is able to consent to immediate intervention, such as surgery. Cinacalcet could be an option to manage calcium levels in the short term.      3. History of Kidney Stones     4. Vitamin D Deficiency: Noted in April 2019.18 (10/13/24). Currently on vitamin D3 50,000 international unit(s) weekly. Can reduce to 1000 international unit(s) daily.      Plan:   - recommend to reduce cinacalcet to 30 mg daily and recheck calcium on Saturday  - reduce vitamin D to 1000 international unit(s) daily.  - Endocrinology will follow up peripherally    David Hull MD, MS  Division of Diabetes and Endocrinology  Department of Medicine

## 2024-10-17 NOTE — PLAN OF CARE
BEH IP Unit Acuity Rating Score (UARS)  Patient is given one point for every criteria they meet.    CRITERIA SCORING   On a 72 hour hold, court hold, committed, stay of commitment, or revocation. 1    Patient LOS on BEH unit exceeds 20 days. 0  LOS: 5   Patient under guardianship, 55+, otherwise medically complex, or under age 11. 1   Suicide ideation without relief of precipitating factors. 0   Current plan for suicide. 0   Current plan for homicide. 0   Imminent risk or actual attempt to seriously harm another without relief of factors precipitating the attempt. 1   Severe dysfunction in daily living (ex: complete neglect for self care, extreme disruption in vegetative function, extreme deterioration in social interactions). 1   Recent (last 7 days) or current physical aggression in the ED or on unit. 0   Restraints or seclusion episode in past 72 hours. 0   Recent (last 7 days) or current verbal aggression, agitation, yelling, etc., while in the ED or unit. 1 - last 10/16   Active psychosis. 1   Need for constant or near constant redirection (from leaving, from others, etc).  1   Intrusive or disruptive behaviors. 1   Patient requires 3 or more hours of individualized nursing care per 8-hour shift (i.e. for ADLs, meds, therapeutic interventions). 1   TOTAL 9

## 2024-10-17 NOTE — PLAN OF CARE
"  Problem: Adult Behavioral Health Plan of Care  Goal: Plan of Care Review  Outcome: Not Progressing  Flowsheets (Taken 10/17/2024 1406)  Plan of Care Reviewed With: patient  Overall Patient Progress: no change  Patient Agreement with Plan of Care: agrees with comment (describe)  Goal: Patient-Specific Goal (Individualization)  Description: You can add care plan individualizations to a care plan. Examples of Individualization might be:  \"Parent requests to be called daily at 9am for status\", \"I have a hard time hearing out of my right ear\", or \"Do not touch me to wake me up as it startles  me\".  Outcome: Not Progressing  Goal: Adheres to Safety Considerations for Self and Others  Outcome: Not Progressing  Goal: Absence of New-Onset Illness or Injury  Outcome: Not Progressing  Goal: Optimized Coping Skills in Response to Life Stressors  Outcome: Not Progressing  Intervention: Promote Effective Coping Strategies  Recent Flowsheet Documentation  Taken 10/17/2024 0900 by Timothy Uriostegui RN  Supportive Measures:   verbalization of feelings encouraged   positive reinforcement provided   active listening utilized  Goal: Develops/Participates in Therapeutic [place] to Support Successful Transition  Outcome: Not Progressing  Intervention: Foster Therapeutic [place]  Recent Flowsheet Documentation  Taken 10/17/2024 0900 by Timothy Uriostegui RN  Trust Relationship/Rapport:   care explained   choices provided   emotional support provided   empathic listening provided   questions answered   questions encouraged   reassurance provided   thoughts/feelings acknowledged     Problem: Psychotic Signs/Symptoms  Goal: Improved Behavioral Control (Psychotic Signs/Symptoms)  Outcome: Not Progressing  Goal: Optimal Cognitive Function (Psychotic Signs/Symptoms)  Outcome: Not Progressing  Intervention: Support and Promote Cognitive Ability  Recent Flowsheet Documentation  Taken 10/17/2024 0900 by Timothy Uriostegui RN  Trust " Relationship/Rapport:   care explained   choices provided   emotional support provided   empathic listening provided   questions answered   questions encouraged   reassurance provided   thoughts/feelings acknowledged  Goal: Increased Participation and Engagement (Psychotic Signs/Symptoms)  Outcome: Not Progressing  Intervention: Facilitate Participation and Engagement  Recent Flowsheet Documentation  Taken 10/17/2024 0900 by Timothy Uriostegui RN  Supportive Measures:   verbalization of feelings encouraged   positive reinforcement provided   active listening utilized  Goal: Improved Mood Symptoms (Psychotic Signs/Symptoms)  Outcome: Not Progressing  Intervention: Optimize Emotion and Mood  Recent Flowsheet Documentation  Taken 10/17/2024 0900 by Timothy Uriostegui RN  Supportive Measures:   verbalization of feelings encouraged   positive reinforcement provided   active listening utilized  Goal: Improved Psychomotor Symptoms (Psychotic Signs/Symptoms)  Outcome: Not Progressing  Goal: Decreased Sensory Symptoms (Psychotic Signs/Symptoms)  Outcome: Not Progressing  Intervention: Minimize and Manage Sensory Impairment  Recent Flowsheet Documentation  Taken 10/17/2024 0900 by Timothy Uriostegui RN  Sensory Stimulation Regulation: quiet environment promoted  Goal: Improved Sleep (Psychotic Signs/Symptoms)  Outcome: Not Progressing  Goal: Enhanced Social, Occupational or Functional Skills (Psychotic Signs/Symptoms)  Outcome: Not Progressing  Intervention: Promote Social, Occupational and Functional Ability  Recent Flowsheet Documentation  Taken 10/17/2024 0900 by Timothy Uriostegui RN  Social Functional Ability Promotion:   opportunity for activity provided   self-expression encouraged   social interaction promoted  Trust Relationship/Rapport:   care explained   choices provided   emotional support provided   empathic listening provided   questions answered   questions encouraged   reassurance provided   thoughts/feelings acknowledged      Problem: Depression  Goal: Improved Mood  Outcome: Not Progressing  Intervention: Monitor and Manage Depressive Symptoms  Recent Flowsheet Documentation  Taken 10/17/2024 0900 by Timothy Uriostegui RN  Supportive Measures:   verbalization of feelings encouraged   positive reinforcement provided   active listening utilized  Family/Support System Care:   involvement promoted   presence promoted   self-care encouraged   support provided     Problem: Suicidal Behavior  Goal: Suicidal Behavior is Absent or Managed  Outcome: Not Progressing  Intervention: Provide Immediate and Ongoing Protective Physical Environment  Recent Flowsheet Documentation  Taken 10/17/2024 0900 by Timothy Uriostegui RN  Safe Transition Promotion: protective factors promoted     Problem: Anxiety  Goal: Anxiety Reduction or Resolution  Outcome: Not Progressing  Intervention: Promote Anxiety Reduction  Recent Flowsheet Documentation  Taken 10/17/2024 0900 by Timothy Uriostegui RN  Supportive Measures:   verbalization of feelings encouraged   positive reinforcement provided   active listening utilized  Family/Support System Care:   involvement promoted   presence promoted   self-care encouraged   support provided     Problem: Sleep Disturbance  Goal: Adequate Sleep/Rest  Outcome: Not Progressing  Intervention: Promote Sleep/Rest  Recent Flowsheet Documentation  Taken 10/17/2024 0900 by Timothy Uriostegui RN  Sleep/Rest Enhancement: regular sleep/rest pattern promoted     Problem: Seclusion/Restraint, Behavioral  Goal: Seclusion/Behavioral Restraint Goal: Absence of Harm or Injury  Outcome: Not Progressing  Intervention: Protect Dignity, Rights, and Personal Wellbeing  Recent Flowsheet Documentation  Taken 10/17/2024 0900 by Timothy Uriostegui RN  Trust Relationship/Rapport:   care explained   choices provided   emotional support provided   empathic listening provided   questions answered   questions encouraged   reassurance provided   thoughts/feelings  acknowledged  Intervention: Protect Skin and Joint Integrity  Recent Flowsheet Documentation  Taken 10/17/2024 0900 by Timothy Uriostegui RN  Body Position: position changed independently   Goal Outcome Evaluation:      Plan of Care Reviewed With: patient    Overall Patient Progress: no change  Patient still on SIO 2:1,still disorganized.Patient is confused only oriented to self.Patient does not know where he is,cannot say today's date/time.Patient was prompted for ten minutes in the morning before he took his medication.This afternoon patient put a pill in his mouth and removed it.After 3-4 prompts he took it.Patient has been demanding for his phone,difficult to redirect.Patient almost snatched phone from lab tech this morning.Patient is independent with bathroom,he was given a urinal to collect sample but instead he filled it with water.Patient has no insight of his illness.Patient does not understand why he is here.Patient was dismissive of  assessment questions.He eats and drinks okay.Hygiene is appropriate.Plan is to continue to redirect patient hoping that things will turn around soon.  Temp: 97.7  F (36.5  C) Temp src: Oral BP: 108/73 Pulse: 60     SpO2: 95 % O2 Device: None (Room air)

## 2024-10-17 NOTE — PLAN OF CARE
Problem: Adult Behavioral Health Plan of Care  Goal: Adheres to Safety Considerations for Self and Others  Intervention: Develop and Maintain Individualized Safety Plan  Recent Flowsheet Documentation  Taken 10/16/2024 1856 by Torito Cruz, RN  Safety Measures:   environmental rounds completed   safety rounds completed     Problem: Adult Behavioral Health Plan of Care  Goal: Absence of New-Onset Illness or Injury  Outcome: Progressing  Intervention: Identify and Manage Fall Risk  Recent Flowsheet Documentation  Taken 10/16/2024 1856 by Torito Cruz RN  Safety Measures:   environmental rounds completed   safety rounds completed   Goal Outcome Evaluation:    Plan of Care Reviewed With: patient Plan of Care Reviewed With: patient    Overall Patient Progress: no changeOverall Patient Progress: no change      Alert, forgetful and confused, he was able to communicate needs. Visible in milieu, intermittently restless, observed loitering by exit doors, trying door handles and was intrusive at times but he was redirectable. Social and interactive with peers and staff members. Flat, tangential, rambling speech at times. He denied anxiety or depression, no suicidal behavior observed. Medication administered with a lot of encouragement. ADLs WNL, he denied pain or discomfort. Food and fluid intake WNL.

## 2024-10-17 NOTE — PROGRESS NOTES
"  ----------------------------------------------------------------------------------------------------------  [facility]  Psychiatry Progress Note  Hospital Day #5     Interim History:     The patient's care was discussed with the treatment team and chart notes were reviewed.    Vitals: VSS  Sleep: 6.25 hours (10/17/24 0600)  Scheduled medications: Took all scheduled medications as prescribed  Psychiatric PRN medications: None     Staff Report:   -He told the O staff when he is discharged he will come with guns and kill the staff here   -Continues to stand by doors, trying the handles  -Tried to enter other patient's rooms    Please see staff note for details      Subjective:     Patient Interview:   Estevan Aaron was interviewed outside his room. He reports \"doing pretty good\" this morning and that he slept well last night. Upon asking about his preliminary hearing he says he's not going to go. Says he doesn't need to go because he can \"russ better than ever.\" Reports his mood is \"good because he's hanging out with good people.\" Argumentative with this writer when told he cannot have this phone on this unit. Threatens staff when told he cannot have his phone. Not agreeable to increasing evening Zyprexa dose. Tells this writer \"that's what you do to people\" \"it turns them into zombies\" upon being told we would like to increase Zyprexa.    ROS:  Negative except for above.      Objective:     Vitals:  BP (P) 130/72   Pulse 68   Temp 97.8  F (36.6  C) (Oral)   Resp 16   Wt 105.7 kg (233 lb)   SpO2 97%   BMI 37.61 kg/m      Allergies:  No Known Allergies    Current Medications:  Scheduled:  Current Facility-Administered Medications   Medication Dose Route Frequency Provider Last Rate Last Admin    acetaminophen (TYLENOL) tablet 650 mg  650 mg Oral Q4H PRN Nikki Caceres MD        alum & mag hydroxide-simethicone (MAALOX) suspension 30 mL  30 mL Oral Q4H PRN " Nikki Caceres MD        amLODIPine (NORVASC) tablet 10 mg  10 mg Oral Daily Presley Barrera MD   10 mg at 10/16/24 0838    atorvastatin (LIPITOR) tablet 40 mg  40 mg Oral Daily Nikki Caceres MD   40 mg at 10/16/24 0838    cholecalciferol (VITAMIN D3) capsule 1,250 mcg  1,250 mcg Oral Q7 Days Evelia Grimm DO   1,250 mcg at 10/15/24 1252    cloNIDine (CATAPRES) tablet 0.1 mg  0.1 mg Oral BID Presley Barrera MD   0.1 mg at 10/16/24 2243    [Held by provider] FLUoxetine (PROzac) capsule 40 mg  40 mg Oral Daily Nikki Caceres MD        furosemide (LASIX) tablet 40 mg  40 mg Oral Daily Presley Barrera MD   40 mg at 10/16/24 0839    gabapentin (NEURONTIN) capsule 100 mg  100 mg Oral Q6H PRN Nikki Caceres MD        lisinopril (ZESTRIL) tablet 40 mg  40 mg Oral Daily Presley Barrera MD   40 mg at 10/16/24 0837    metoprolol succinate ER (TOPROL XL) 24 hr tablet 50 mg  50 mg Oral Daily Presley Barrera MD   50 mg at 10/16/24 0837    nicotine (NICODERM CQ) 14 MG/24HR 24 hr patch 1 patch  1 patch Transdermal Daily Evelia Grimm DO   1 patch at 10/16/24 0845    nicotine (NICODERM CQ) 21 MG/24HR 24 hr patch 1 patch  1 patch Transdermal Daily PRN Britt Kang MD   1 patch at 10/13/24 1611    OLANZapine (zyPREXA) tablet 5 mg  5 mg Oral TID PRN Evelia Grimm DO        Or    OLANZapine (zyPREXA) injection 5 mg  5 mg Intramuscular TID PRN Evelia Grimm DO        OLANZapine (zyPREXA) tablet 10 mg  10 mg Oral QAM Evelia Grimm DO   10 mg at 10/16/24 0837    OLANZapine zydis (zyPREXA) ODT tab 15 mg  15 mg Oral At Bedtime Evelia Grimm DO   15 mg at 10/16/24 2244    polyethylene glycol (MIRALAX) Packet 17 g  17 g Oral Daily PRN Nikki Caceres MD        traZODone (DESYREL) half-tab 25 mg  25 mg Oral At Bedtime PRN Evelia Grimm DO   25 mg at 10/15/24 2108    Or    traZODone (DESYREL) tablet 50 mg  50 mg Oral At Bedtime PRN Evelia Grimm DO         PRN:  Current Facility-Administered Medications   Medication Dose Route  Frequency Provider Last Rate Last Admin    acetaminophen (TYLENOL) tablet 650 mg  650 mg Oral Q4H PRN Nikki Caceres MD        alum & mag hydroxide-simethicone (MAALOX) suspension 30 mL  30 mL Oral Q4H PRN Nikki Caceres MD        amLODIPine (NORVASC) tablet 10 mg  10 mg Oral Daily Presley Barrera MD   10 mg at 10/16/24 0838    atorvastatin (LIPITOR) tablet 40 mg  40 mg Oral Daily Nikki Caceres MD   40 mg at 10/16/24 0838    cholecalciferol (VITAMIN D3) capsule 1,250 mcg  1,250 mcg Oral Q7 Days Evelia Grimm DO   1,250 mcg at 10/15/24 1252    cloNIDine (CATAPRES) tablet 0.1 mg  0.1 mg Oral BID Presley Barrera MD   0.1 mg at 10/16/24 2243    [Held by provider] FLUoxetine (PROzac) capsule 40 mg  40 mg Oral Daily Nikki Caceres MD        furosemide (LASIX) tablet 40 mg  40 mg Oral Daily Presley Barrera MD   40 mg at 10/16/24 0839    gabapentin (NEURONTIN) capsule 100 mg  100 mg Oral Q6H PRN Nikki Caceres MD        lisinopril (ZESTRIL) tablet 40 mg  40 mg Oral Daily Presley Barrera MD   40 mg at 10/16/24 0837    metoprolol succinate ER (TOPROL XL) 24 hr tablet 50 mg  50 mg Oral Daily Presley Barrera MD   50 mg at 10/16/24 0837    nicotine (NICODERM CQ) 14 MG/24HR 24 hr patch 1 patch  1 patch Transdermal Daily Evelia Grimm DO   1 patch at 10/16/24 0845    nicotine (NICODERM CQ) 21 MG/24HR 24 hr patch 1 patch  1 patch Transdermal Daily PRN Britt Kang MD   1 patch at 10/13/24 1611    OLANZapine (zyPREXA) tablet 5 mg  5 mg Oral TID PRN Evelia Grimm DO        Or    OLANZapine (zyPREXA) injection 5 mg  5 mg Intramuscular TID PRN Evelia Grimm DO        OLANZapine (zyPREXA) tablet 10 mg  10 mg Oral QAM Evelia Grimm DO   10 mg at 10/16/24 0837    OLANZapine zydis (zyPREXA) ODT tab 15 mg  15 mg Oral At Bedtime Evelia Grimm DO   15 mg at 10/16/24 3879    polyethylene glycol (MIRALAX) Packet 17 g  17 g Oral Daily PRN Nikki Caceres MD        traZODone (DESYREL) half-tab 25 mg  25 mg Oral At Bedtime  "Evelia Kyle DO   25 mg at 10/15/24 2108    Or    traZODone (DESYREL) tablet 50 mg  50 mg Oral At Bedtime PREvelia Talavera DO         Labs and Imaging:  Data this admission:  - CBC unremarkable  - CMP unremarkable  - TSH normal  - UDS negative  - Vit D low  - Hgb A1c 5.9 (10/13/24)  - Lipids unremarkable  - Vit B12 normal  - Folate normal  - Urinalysis unremarkable  - EKG normal sinus rhythm, QTc 390  - Head CT showed no acute changes    Parathyroid hormone:   10/13: 85    Calcium:  10/14: 11.4  10/16: 10.3  10/17: 10.3    Albumin:  10/14: 4.3  10/16: 4.3  10/17: 4.1     Mental Status Exam:     Oriented to:  Grossly Oriented  General:  Awake and Alert  Appearance:  appears stated age, in hospital scrubs  Behavior/Attitude:  accusatory, grandiose, easily distracted, secretive, and suspicious  Eye Contact:  appropriate  Psychomotor: no evidence of tics, dystonia, or tardive dyskinesia, agitated, and restless no catatonia present  Speech:  loud volume/tone and talkative  Language: Fluent in English with appropriate syntax and vocabulary.  Mood:  \"good\"  Affect:  angry, hostile, and irritable  Thought Process:  ruminative, looseness of association, thought blocking, disorganized, and confused  Thought Content:   denies HI ; delusions of paranoia and delusions of grandiosity  Associations:  loose  Insight:  impaired   Judgment:  impaired   Impulse control: decreased  Attention Span:  decreased  Concentration: Distractible  Recent and Remote Memory:  not formally assessed  Fund of Knowledge:  Not assessed  Muscle Strength and Tone: normal  Gait and Station: Normal     Psychiatric Assessment     Estevan Aaron is a 64 year old male with previous psychiatric diagnoses of GEORGINA admitted from the ER on 10/12/2024 due to concern for HI and psychosis in the context of medical issues (hyperthyroidism, hypercalcemia), psychosocial stressors including family dynamics with recent possible divorce. This is the patient's first " psychiatric hospitalization and possibly contact with psychiatry. Significant symptoms on admission included delusions of persecution with grandiose beliefs, homicidal ideations, as well as disorganized thinking and behavior. The MSE on admission was pertinent for a thought process which was perseverative, with rambling, looseness of association, circumstantial, tangential, flight of ideals, disorganized, word salad, and incoherent. Psychological contributions to mental health presentation included absent insight at the moment. Social factors contributing to mental health presentation included a possibly poor support system as well as possible marital conflicts that could be secondary or triggers to mental health symptoms. Biological contributions to mental health presentation included a history of hyperparathyroidism with chronic hypercalcemia per charts.     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 Patient's symptoms of severely disorganized thinking and behavior with delusions fit with a picture of acute to subacute psychosis. Though he has no prior dx of psychosis or past decompensation leading to psychiatric hospitalization, per collateral, in the past has endorsed visual hallucinations 10-15 years ago and in the past has been paranoid/distrusting of others, hence this may not be a first episode psychosis.     Due to patient's prior history of anxiety with possible / speculated comorbid mood components with it and his current treatment with fluoxetine, and considering the patient grossly showed symptoms on interview such as an affect that was irritable, non  blunted, and somewhat labile, grandiosity in regards to his profession, flight of ideas, and self-described changes in sleep that has been more poor, primary leading ddx is bipolar I disorder, current manic episode with psychotic features. Additionally, per collateral, he has recently been noted with hyper verbal speech and recently has spent $70,000 on a car in the past month, which is not usual for him. For these reasons, it would be valuable to hold his Fluoxetine at the moment and continue to monitor for symptoms.     Patient's presentation remains unusual in regards to his age, and psychosis/zelalem due to a medical illness such chronic hypercalcemia, his long standing hyperparathyroid disorder, or any other possible endocrine disorder or syndrome associated with these disorders can be contributing to the patient's symptoms, as case reports in the literature have correlated onsets of psychosis with hypercalcemia or hyperparathyroidism. Patient's calcium returned to normal limits on 10/16, but patient continues to have psychosis.    In summary, the patient's diagnosis is still in evolution. He will likely benefit from further assessment and management this admission. Given that he currently has HI and psychosis, patient warrants inpatient psychiatric hospitalization to maintain his safety.     Psychiatric Plan by Diagnosis      Today's changes:  - None  - FEP work-up started     # Bipolar I Disorder, current manic episode with psychotic features    1. Medications:  - Hold PTA fluoxetine 40 mg.  - Zyprexa 10 mg during day and 15 mg at bedtime     2. Pertinent Labs/Monitoring:   - See above     3. Additional Plans:  - Patient will be treated in therapeutic milieu with appropriate individual and group therapies as described     # Unspecified anxiety vs Generalized Anxiety Disorder  - Monitor for symptoms.  - Fluoxetine held due to suspicion of ongoing manic symptoms.     Psychiatric Hospital Course:      Estevan PRABHAKAR  Agnieszka was admitted to [facility] on a 72 hour hold.   Medications:  PTA fluoxetine was held due to concern for worsening of zelalem   New medications started at the time of admission include Zyprexa.   Increased Zyprexa 10 mg at bedtime was to 10 mg BID (10/14)   Increased Zyprexa 10 mg BID to 10 mg during day and 15 mg at bedtime (10/15)  The risks, benefits, alternatives, and side effects were discussed and understood by the patient and other caregivers.     Medical Assessment and Plan     Medical diagnoses to be addressed this admission:    # Hypertension  - Continue PTA medications  Furosemide 40 mg daily  Lisinopril 40 mg daily  Metoprolol 50 mg daily  Amlodipine 10 mg daily  Clonidine 0.1 mg BID     # Hyperlipidemia  - Continue PTA Atorvastatin 40 mg     # Hyperparathyroidism  # Hypercalcemia, hypophosphoremia   Increased Ca level to 10.9 and decreased Ph level to 2.3 in the ED. Suspicion patient's hypercalcemia could be contributing to symptoms of psychosis.  - Consulted endocrinology, who started cinacalcet 30 mg BID on 10/13  - 10/16 endocrinology recommended continuing to trend calcium and albumin to make sure patient does not become hypocalcemic and recommended holding cinacalcet   - 10/17, calcium and albumin wnl, endo recommended cinacalcet 30 mg once daily and repeat labs 10/19     Medical course: Patient was physically examined by the ED prior to being transferred to the unit and was found to be medically stable and appropriate for admission.      Consults: Psychiatry, Endocrinology (follow-up of hyperthyroidism / hypercalcemia and hypertension)     Checklist     Legal Status: Court hold     Safety Assessment:   Behavioral Orders   Procedures    Assault precautions    Code 1 - Restrict to Unit    Elopement precautions    Routine Programming     As clinically indicated    Status 15     Every 15 minutes.    Status Individual Observation     2:1 SIO    Patient SIO status reviewed with team/RN.  Please also  refer to RN/team documentation for add'l detail.    -SIO staff to monitor following which have contributed to patient being on SIO:  Pt has psychosis regarding being followed and attacked, very paranoid, HI    -Possible interventions SIO staff could use to support patient's treatment progress:  Redirect and reassure  -When following observed, team will review discontinuation of SIO:  Psychosis improves     Order Specific Question:   CONTINUOUS 24 hours / day     Answer:   Other     Order Specific Question:   Specify distance     Answer:   10 feet, 5 feet when close to the doors     Order Specific Question:   Indications for SIO     Answer:   Assault risk     Order Specific Question:   Indications for SIO     Answer:   Severe intrusiveness     Risk Assessment:  Risk for harm is elevated.  Risk factors: SI, HI, impulsive, and past behaviors  Protective factors: family     SIO: 2:1    Disposition: Pending stabilization, medication optimization, & development of a safe discharge plan. We are in the process of submitting commitment request to [facility].     Attestations     Rebeca Saucedo, MS3  [employer]    Resident/Fellow Attestation   I, Presley Barrera MD, was present with the medical/ALLEN student who participated in the service and in the documentation of the note.  I have verified the history and personally performed the physical exam and medical decision making.  I agree with the assessment and plan of care as documented in the note.      Presley Barerra MD  PGY-1   Date of Service (when I saw the patient): 10/17/2024      Attestation:  This patient has been seen and evaluated by me, Pete Smith MD.  I have discussed this patient with the house staff team including the resident and/or medical student and I agree with the findings and plan in this note.    I have reviewed today's vital signs, medications, labs and imaging. Pete Smith MD , PhD.

## 2024-10-17 NOTE — PLAN OF CARE
Problem: Sleep Disturbance  Goal: Adequate Sleep/Rest  Outcome: Progressing   Goal Outcome Evaluation:       Pt was in bed at the start of this shift. Pt is on SIO 2:1 for assault risk, and severe intrusiveness. He apppears to have slept for  6.25 hours. Pt denies any pain or discomfort this shift.his shift. Pt was observed pacing the marmolejo a few times, and trying to enter other pt's rooms, and exit doors. He was redirected by his SIO staffs a few times.    Pt's most recent vitals:  Blood pressure (P) 130/72, pulse 68, temperature 97.8  F (36.6  C), temperature source Oral, resp. rate 16, weight 105.7 kg (233 lb), SpO2 97%.

## 2024-10-17 NOTE — PLAN OF CARE
"Team Note Due:  Monday    Assessment/Intervention/Current Symtoms and Care Coordination:  Chart review and met with team, discussed pt progress, symptomology, and response to treatment.  Discussed the discharge plan and any potential impediments to discharge.    Pt has preliminary court hearing this afternoon and when this was brought up to pt in the morning, he stated he would not be attending the hearing. Sent update to PPS on possibility of pt declining to participate.    Met with pt again at 2pm to remind him of court this afternoon. Pt said \"well that doesn't sound good \" and asked \"so you'll have the locks taken off then?\" I let pt know it would be encouraged for him to attend and we would have him on a laptop in one of the interview rooms for privacy.    Pt reluctantly agreed to participate in hearing with encouragement.    Discharge Plan or Goal:  TBD - patient has been living out of hotels for the last 2 months but may be able to stay with one of his daughters upon discharge.     Barriers to Discharge:  Patient requires further psychiatric stabilization due to current symptomology, medication management with changes subject to provider, coordination with outside supports, and aftercare planning. Pt is also on [facility] and a petition for MI Commitment/Ortega has been filed.     Referral Status:  None at this time     Legal Status:  Court hold    Petition for MI Commitment and Ortega filed 10/14/24  [facility]: [place]  File Number: [id number]  Start and expiration date of commitment: [facility]    Ortega meds requested: Haldol, Prolixin, Clozaril, Risperdal, Invega, Zyprexa, Seroquel, Abilify    Future Court Hearings:  Final Hearing: Thursday 10/24 at [facility]    PPS:  Shelby Chowdary: [phone]  [email or web address]    Contacts:  N/A      Upcoming Meetings and Dates/Important Information and next steps:  Follow commitment process  Send Treating Physician Recommendation form to PPS by Wedneday 10/23 at 12pm for final " hearing.

## 2024-10-18 PROCEDURE — 250N000013 HC RX MED GY IP 250 OP 250 PS 637

## 2024-10-18 PROCEDURE — 124N000002 HC R&B MH UMMC

## 2024-10-18 PROCEDURE — 99232 SBSQ HOSP IP/OBS MODERATE 35: CPT | Mod: GC | Performed by: PSYCHIATRY & NEUROLOGY

## 2024-10-18 RX ADMIN — AMLODIPINE BESYLATE 10 MG: 5 TABLET ORAL at 08:42

## 2024-10-18 RX ADMIN — FUROSEMIDE 40 MG: 40 TABLET ORAL at 08:42

## 2024-10-18 RX ADMIN — Medication 1 PATCH: at 08:41

## 2024-10-18 RX ADMIN — CLONIDINE HYDROCHLORIDE 0.1 MG: 0.1 TABLET ORAL at 08:43

## 2024-10-18 RX ADMIN — OLANZAPINE 2.5 MG: 5 TABLET, FILM COATED ORAL at 14:55

## 2024-10-18 RX ADMIN — OLANZAPINE 15 MG: 15 TABLET, ORALLY DISINTEGRATING ORAL at 19:49

## 2024-10-18 RX ADMIN — LISINOPRIL 40 MG: 10 TABLET ORAL at 08:42

## 2024-10-18 RX ADMIN — CINACALCET 30 MG: 30 TABLET ORAL at 08:43

## 2024-10-18 RX ADMIN — OLANZAPINE 10 MG: 10 TABLET, FILM COATED ORAL at 08:43

## 2024-10-18 RX ADMIN — METOPROLOL SUCCINATE 50 MG: 50 TABLET, EXTENDED RELEASE ORAL at 08:43

## 2024-10-18 RX ADMIN — ATORVASTATIN CALCIUM 40 MG: 20 TABLET, FILM COATED ORAL at 08:43

## 2024-10-18 RX ADMIN — CLONIDINE HYDROCHLORIDE 0.1 MG: 0.1 TABLET ORAL at 19:49

## 2024-10-18 ASSESSMENT — ACTIVITIES OF DAILY LIVING (ADL)
ADLS_ACUITY_SCORE: 28
HYGIENE/GROOMING: INDEPENDENT;HANDWASHING
LAUNDRY: UNABLE TO COMPLETE
ADLS_ACUITY_SCORE: 28
ADLS_ACUITY_SCORE: 28
DRESS: STREET CLOTHES
DRESS: INDEPENDENT
ADLS_ACUITY_SCORE: 28
ORAL_HYGIENE: INDEPENDENT
ADLS_ACUITY_SCORE: 28
ORAL_HYGIENE: INDEPENDENT
ADLS_ACUITY_SCORE: 28

## 2024-10-18 NOTE — PLAN OF CARE
Problem: Adult Behavioral Health Plan of Care  Goal: Plan of Care Review  Outcome: Not Progressing  Flowsheets  Taken 10/17/2024 2002  Plan of Care Reviewed With: patient  Overall Patient Progress: no change  Patient Agreement with Plan of Care: agrees with comment (describe)  Taken 10/17/2024 1700  Patient Agreement with Plan of Care: agrees with comment (describe)     Problem: Adult Behavioral Health Plan of Care  Goal: Absence of New-Onset Illness or Injury  Intervention: Identify and Manage Fall Risk  Recent Flowsheet Documentation  Taken 10/17/2024 1700 by Torito Cruz RN  Safety Measures:   environmental rounds completed   safety rounds completed   Goal Outcome Evaluation:    Plan of Care Reviewed With: patient Plan of Care Reviewed With: patient    Overall Patient Progress: no changeOverall Patient Progress: no change     Patient continues to frequently loiter at exit doors, try door handles, and make statement of wanting to leave. He attempted to go into several other rooms, he was redirectable and he required constant reorientation and redirection. 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 Able to communicate needs, remained in milieu most of the shift. Intermittently pleasant, he denied any anxiety or depression, no suicidal behavior observed. Food and fluid intake WNL, he denied any pain or discomfort. Medication compliant.   Ex-wife visited, visit went well, FILIBERTO signed
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
## 2024-10-18 NOTE — PLAN OF CARE
Problem: Sleep Disturbance  Goal: Adequate Sleep/Rest  Outcome: Progressing    Pt appears to have slept for 6 hours. Pt did not complain of pain and no PRN medications were given. Pt denies anxiety, depression, and SI/SIB. Pt is on 2-1 SIO for assault risk and severe intrusiveness. 15 minutes safety checks were in place. Staff will continue to offer support to pt.

## 2024-10-18 NOTE — PROGRESS NOTES
At 1530 Upon walking on the unit from report room, duress bottom activated, writer observed from a distance patient being manually guided out of the break room. Per PA report, patient attempted to go into the break room and was not responding to verbal redirections/reorientation. Patient remains confused, disoriented to place, time and situation. He requires constant redirection and reorientation for elopement behaviors.

## 2024-10-18 NOTE — PLAN OF CARE
BEH IP Unit Acuity Rating Score (UARS)  Patient is given one point for every criteria they meet.    CRITERIA SCORING   On a 72 hour hold, court hold, committed, stay of commitment, or revocation. 1    Patient LOS on BEH unit exceeds 20 days. 0  LOS: 6   Patient under guardianship, 55+, otherwise medically complex, or under age 11. 1   Suicide ideation without relief of precipitating factors. 0   Current plan for suicide. 0   Current plan for homicide. 0   Imminent risk or actual attempt to seriously harm another without relief of factors precipitating the attempt. 1   Severe dysfunction in daily living (ex: complete neglect for self care, extreme disruption in vegetative function, extreme deterioration in social interactions). 1   Recent (last 7 days) or current physical aggression in the ED or on unit. 0   Restraints or seclusion episode in past 72 hours. 0   Recent (last 7 days) or current verbal aggression, agitation, yelling, etc., while in the ED or unit. 1 - last 10/16   Active psychosis. 1   Need for constant or near constant redirection (from leaving, from others, etc).  1   Intrusive or disruptive behaviors. 1   Patient requires 3 or more hours of individualized nursing care per 8-hour shift (i.e. for ADLs, meds, therapeutic interventions). 1   TOTAL 9

## 2024-10-18 NOTE — PLAN OF CARE
Team Note Due:  Monday    Assessment/Intervention/Current Symtoms and Care Coordination:  Chart review and met with team, discussed pt progress, symptomology, and response to treatment.  Discussed the discharge plan and any potential impediments to discharge.    Pt's daughter and ex-wife are requesting conference call with team today as they have questions and would like an update on plan for pt. Scheduled for 1pm.    Received update from [employer] there are no Ortega examiners next week, so pt's final hearing will still take place on 10/24 but Ortega hearing is not until 10/31.    Attending MD, resident MD, and writer met with pt's daughter Maria C and ex-wife Clifford via Teams at 1pm. Family provided additional information about pt's history and concern about longstanding paranoia/delusions. Maria C agreed to be the main point of contact. She and Clifford are looking into emergency power of  and were given information for [facility]  as they will likely need this court ordered.    Discharge Plan or Goal:  TBD - patient has been living out of hotels for the last 2 months but may be able to stay with one of his daughters upon discharge.     Barriers to Discharge:  Patient requires further psychiatric stabilization due to current symptomology, medication management with changes subject to provider, coordination with outside supports, and aftercare planning. A petition for MI Commitment/Ortega has been filed.     Referral Status:  None at this time     Legal Status:  Court hold    Petition for MI Commitment and Ortega filed 10/14/24  [facility]: [place]  File Number: [id number]  Start and expiration date of commitment: [facility]    Ortega meds requested: Haldol, Prolixin, Clozaril, Risperdal, Invega, Zyprexa, Seroquel, Abilify    Future Court Hearings:  Final Hearing: Thursday 10/24 at [facility]  Ortega Hearing: Thursday 10/31 at [facility]    [employer]PS:  Shelby Chowdary:  [phone]  [email or web address].    Contacts:  Maria C Agnieszka (Daughter): [phone]   Clifford Agnieszka (ex-wife): [phone]      Upcoming Meetings and Dates/Important Information and next steps:  Follow commitment process  Send Treating Physician Recommendation form to [employer] by Wedneday 10/23 at 12pm for final hearing.

## 2024-10-18 NOTE — PLAN OF CARE
"At the start of the shift patient was sleeping in his room. Upon waking he came to the nurses station and stood there waiting without saying anything. When another staff member asked him if he was waiting for coffee he replied \"Of course I am. Why else would I be standing here\". Staff asked him if he would be willing to do a urine sample or blood draw as lab had arrived on the unit and he replied \"Why? Why? There's nothing wrong with me. Why do you need that?\" Staff attempted to explain why this was necessary however, pt showed no insight into why the blood draw was important and then adamantly refused. Patient stated \"I'm not giving any urine, or doing any 24 hour blood draws\". Writer asked pt if he would be okay with writer taking vitals and pt agreed. Writer walked to pts room with pt. During vitals pt kept removing blood pressure cuff, stating it was too loose and that he could fit his fist into there. He continued to critique how writer was taking vitals. Writer informed pt that they did not need to take pts vitals if he didn't want him to and pt replied \"No, I do want you too, but do it right\". At one point during vitals he looked at writer and stated \"You know what's going on\".    Throughout shift patient presented as very disorganized and was difficult to assess.  At one point during shift pt made comment to nurse on floor about wanting to die but then told the nurse \"that was just a joke. Don't tell anyone I said that\". Pt appears to be experiencing paranoid delusions. At one point he told writer to \"Give Bill a call\" when asked who Bill is patient replied \"Never mind\". Pt later mentioned that Bill was the reason for everything. He continued to bring Bill up to staff, mumbling under his breath. When nurse on staff asked about Bill he replied \"You know who he is\" and then crossed his arms.     When taking his meds patient presented as very suspicious. He sat looking at his legs which both had noticeable " "edema. He stated \"look what they've done to me\". He agreed to take his meds so staff began to go over each medication as they opened it. After all of the medications had been opened he became suspicious and he commented that the number of pills he was being given were more then what he took at home. He examined each pill and for some of the pills asked \"What's this one?\" After staff spoke to him for awhile patient eventually took his meds.     During check in pt was focused intently on something he saw in his hands and was moving his hands in a way that resembled reeling in fishing lines. When asked what he was doing he said \"What do you mean what I'm doing? I'm fishing\". He then brushed his hand along the surface of the table. When asked what was on the table he replied \"fishing hooks\". Both the table and his hands were empty. Pt clearly having visual hallucinations. When asked who he was fishing with he pointed and looked over to his left stating \"Them. They were here and now they're not here\". Staff asked who and he replied \"my wife\". When asked if he knew where he was he replied   \"[facility] [place]\". Staff asked \"you think you're in a villa?\" Pt replied \"I didn't say villa, they're condos.\" When asked if he knew what year it was he first stated that it was 1352 and then laughed and replied that it is almost 2025. When told that he was in the hospital and asked if he knew why he was here he replied \"Yes\" but did not elaborate and instead mumbled disorganized phrases. When asked if he was having thoughts of harming himself or others he adamantly replied \"No! I would never\". When asked if he was hearing voices he shook his head no. Pt presented as relatively calm, however, tensed up a little when the team of residents and doctors entered the milieu. He stated \"they're mad at me but I don't give a shit.\" Pt then raised both of his middle fingers. At one point during the assessment he also said \"Fuck the doctors\". " Pt appeared to become even more tense when another patient began laughing loudly. Staff asked him if he wanted to go to his room where it was quieter he agreed and then walked to his room with staff. Later in the shift pt went up to exit doors and was peering out, lingering by the exits but was redirected by staff and then moved into milieu. He then tried to enter another patients room but was again able to be redirected.    Towards the end of the shift patient became increasingly restless and had increased elopement behaviors. Staff attempted to redirect patient and nursing staff attempted to administer Zyprexa. Staff explained what the medication was and patient took the pill and dropped it into the water cup, dissolving the medication. Pt started trying to get other staff members to drink the water cup with the pill in it. Nursing staff asked him if he was going to drink it or not and explained that if he wasn't going to then staff would need to dispose of it. Pt drank half of the cup and then threw the rest of it in the sink.     At start of shift pts pulse was 57, otherwise all other vital signs were stable.    Problem: Psychotic Signs/Symptoms  Goal: Optimal Cognitive Function (Psychotic Signs/Symptoms)  Outcome: Not Progressing

## 2024-10-19 LAB
ALBUMIN SERPL BCG-MCNC: 4.2 G/DL (ref 3.5–5.2)
CALCIUM SERPL-MCNC: 9.8 MG/DL (ref 8.8–10.4)

## 2024-10-19 PROCEDURE — 36415 COLL VENOUS BLD VENIPUNCTURE: CPT

## 2024-10-19 PROCEDURE — 124N000002 HC R&B MH UMMC

## 2024-10-19 PROCEDURE — 250N000013 HC RX MED GY IP 250 OP 250 PS 637

## 2024-10-19 PROCEDURE — 82040 ASSAY OF SERUM ALBUMIN: CPT

## 2024-10-19 PROCEDURE — 82310 ASSAY OF CALCIUM: CPT

## 2024-10-19 RX ADMIN — CINACALCET 30 MG: 30 TABLET ORAL at 08:17

## 2024-10-19 RX ADMIN — ATORVASTATIN CALCIUM 40 MG: 20 TABLET, FILM COATED ORAL at 08:17

## 2024-10-19 RX ADMIN — CLONIDINE HYDROCHLORIDE 0.1 MG: 0.1 TABLET ORAL at 19:12

## 2024-10-19 RX ADMIN — OLANZAPINE 15 MG: 15 TABLET, ORALLY DISINTEGRATING ORAL at 19:13

## 2024-10-19 RX ADMIN — FUROSEMIDE 40 MG: 40 TABLET ORAL at 08:19

## 2024-10-19 RX ADMIN — Medication 1 PATCH: at 08:19

## 2024-10-19 RX ADMIN — OLANZAPINE 10 MG: 10 TABLET, FILM COATED ORAL at 08:17

## 2024-10-19 RX ADMIN — CLONIDINE HYDROCHLORIDE 0.1 MG: 0.1 TABLET ORAL at 08:18

## 2024-10-19 RX ADMIN — AMLODIPINE BESYLATE 10 MG: 5 TABLET ORAL at 08:19

## 2024-10-19 RX ADMIN — LISINOPRIL 40 MG: 10 TABLET ORAL at 08:17

## 2024-10-19 RX ADMIN — METOPROLOL SUCCINATE 50 MG: 50 TABLET, EXTENDED RELEASE ORAL at 08:19

## 2024-10-19 RX ADMIN — OLANZAPINE 5 MG: 5 TABLET, FILM COATED ORAL at 11:10

## 2024-10-19 ASSESSMENT — ACTIVITIES OF DAILY LIVING (ADL)
ADLS_ACUITY_SCORE: 28
ORAL_HYGIENE: INDEPENDENT
HYGIENE/GROOMING: HANDWASHING;INDEPENDENT
ADLS_ACUITY_SCORE: 28

## 2024-10-19 NOTE — PROGRESS NOTES
Endocrine Progress Note  Patient: Estevan Aaron   MRN: [id number]  Date of Service: 10/19/2024    Patient has received cinacalcet 30 mg twice day for the past few days. Calcium is trending down to 10.3 10/17/2024. .        Lab reviewed   Latest Reference Range & Units 10/13/24 08:05 10/14/24 12:41 10/16/24 14:15 10/17/24 08:09   Calcium 8.8 - 10.4 mg/dL 12.5 (H) 11.4 (H) 10.3 10.3   Albumin 3.5 - 5.2 g/dL 4.6 4.3 4.3 4.1   (H): Data is abnormally high    Assessment and plan:  Estevan Aaron is a 64 year old  male who was hospitalized on the inpatient psychiatry service: He was admitted from the ER on 10/12/2024 due to concern for homicidal ideation and psychosis. Endocrinology is consulted due to hypercalcemia and hyperparathyroidism     1. Primary Hyperparathyroidism: The patient has a history of mild to moderate hypercalcemia, along with elevated parathyroid hormone levels and upper-normal 24-hour urine calcium excretion, indicating primary hyperparathyroidism. A review of his records in our EHR shows no prior use of lithium.   - calcium trending down responded well with cinacalcet.  - recommend to reduce cinacalcet to 30 mg daily and recheck calcium on Wednesday and will be check today.      2. Psychosis: While hypercalcemia can sometimes cause altered mental status or psychosis, it s unclear if this is the cause in this patient. Longstanding mild to moderate hypercalcemia (present since 2015) is generally well-tolerated. However, treatment for primary hyperparathyroidism is advised due to the patient's mental health changes, hypercalcemia severity, and history of kidney stones. Unclear if the patient is able to consent to immediate intervention, such as surgery. Cinacalcet could be an option to manage calcium levels in the short term.      3. History of Kidney Stones     4. Vitamin D Deficiency: Noted in April 2019.18 (10/13/24). Currently on vitamin D3 50,000 international unit(s) weekly. Can reduce to 1000  international unit(s) daily.     Plan:   - Continue cinacalcet to 30 mg daily and recheck calcium today  - Continue  vitamin D to 1000 international unit(s) daily.  - Endocrinology will follow up peripherally      Erica Alvarez MD   Endocrinology Fellow   Page # [phone]  On Vocera    Case was discussed and reviewed with Endocrinology Attending Dr. David Hull

## 2024-10-19 NOTE — PLAN OF CARE
Problem: Adult Behavioral Health Plan of Care  Goal: Plan of Care Review  Outcome: Not Progressing  Flowsheets  Taken 10/19/2024 1813  Plan of Care Reviewed With: patient  Overall Patient Progress: no change  Patient Agreement with Plan of Care: unable to participate  Taken 10/19/2024 1650  Patient Agreement with Plan of Care: unable to participate     Problem: Adult Behavioral Health Plan of Care  Goal: Develops/Participates in Therapeutic [place] to Support Successful Transition  Intervention: Foster Therapeutic [place]  Recent Flowsheet Documentation  Taken 10/19/2024 1650 by Torito Cruz RN  Trust Relationship/Rapport:   choices provided   emotional support provided   empathic listening provided   questions answered   questions encouraged   reassurance provided   Goal Outcome Evaluation:    Plan of Care Reviewed With: patient Plan of Care Reviewed With: patient    Overall Patient Progress: no changeOverall Patient Progress: no change     Patient remains restless and agitated all shift. He required constant reorientation and redirections. Alert but confused, disoriented to time, place and situation. Continues with loitering at exit doors, trying door handles, and making statement of wanting to leave. Continues to to be intrusive, attempting to enter other patient' rooms. Observed running in hallway, appeared to be having visual hallucinations. SIO 2:1 continues.  He denied pain or discomfort, food and fluid intake WNL. Unable to participate in mental health assessment.

## 2024-10-19 NOTE — PLAN OF CARE
Problem: Adult Behavioral Health Plan of Care  Goal: Absence of New-Onset Illness or Injury  Outcome: Progressing     Problem: Adult Behavioral Health Plan of Care  Goal: Plan of Care Review  Outcome: Not Progressing  Flowsheets  Taken 10/18/2024 2000  Plan of Care Reviewed With: patient  Overall Patient Progress: no change  Patient Agreement with Plan of Care: unable to participate  Taken 10/18/2024 1609  Patient Agreement with Plan of Care: unable to participate   Goal Outcome Evaluation:    Plan of Care Reviewed With: patient Plan of Care Reviewed With: patient    Overall Patient Progress: no changeOverall Patient Progress: no change     Alert, disoriented to time, place and situation. Visible in milieu all shift, continued to try door handles, loiter by exit doors, and make statement about leaving. Required constant redirections, he was mostly restless and agitated. He denied anxiety or depression, denied suicidal thoughts during assessment. Appeared to be responding to internal stimuli, and having visual hallucinations. Stated that he was fishing when observed gesturing motioning his arms. He denied pain or discomfort. Food and fluid intake WNL. Required encouragement for medication administration.
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
## 2024-10-19 NOTE — PLAN OF CARE
Problem: Depression  Goal: Improved Mood  Outcome: Progressing  Intervention: Monitor and Manage Depressive Symptoms  Recent Flowsheet Documentation  Taken 10/19/2024 1200 by Rocío Curry RN  Supportive Measures: self-care encouraged     Problem: Suicidal Behavior  Goal: Suicidal Behavior is Absent or Managed  Outcome: Progressing     Problem: Anxiety  Goal: Anxiety Reduction or Resolution  Outcome: Progressing  Intervention: Promote Anxiety Reduction  Recent Flowsheet Documentation  Taken 10/19/2024 1200 by Rocío Curry RN  Supportive Measures: self-care encouraged   Goal Outcome Evaluation:    Plan of Care Reviewed With: patient    Pt continues to be confused and  disorganized. He loiters near doorways, tries to open them  or goes into any open doors.  In the morning pt was cooperative with medications. He is pleasant and cooperative. When given medications, pt looks a the wrappers despite appearing not to know his medications. Pt is eating and drinking fine. Pt was encouraged to shower but he declined. Pt goes to and from his room, walks the halls and sits by the dining room, wear the door. Aften times he is bringing his folder  to the dining area and forgets about it. When he remembers his paper work, he goes around looking for it. Pt is being redirected by his SIO staff. At times , he is compliant, at other times, he gets frustrated and becomes verbally aggressive to them. Mid morning, he started to be agitated and really pushing the doors  to open them. He then started to verbally  insult staff. PRN administered.  Pt also observed to be sitting on the phone talking to someone. At other times, pt observed to be fixing a imaginary fishing line on something at the same time rambling to himself. Pt appear to be responding to internal stimuli. Pt tells staff that there is  boat and water  to where he is going .     PRN : Zyprexa

## 2024-10-20 PROCEDURE — 250N000013 HC RX MED GY IP 250 OP 250 PS 637

## 2024-10-20 PROCEDURE — 124N000002 HC R&B MH UMMC

## 2024-10-20 RX ADMIN — Medication 1 PATCH: at 08:33

## 2024-10-20 RX ADMIN — FUROSEMIDE 40 MG: 40 TABLET ORAL at 08:33

## 2024-10-20 RX ADMIN — CLONIDINE HYDROCHLORIDE 0.1 MG: 0.1 TABLET ORAL at 08:33

## 2024-10-20 RX ADMIN — OLANZAPINE 15 MG: 15 TABLET, ORALLY DISINTEGRATING ORAL at 19:05

## 2024-10-20 RX ADMIN — CLONIDINE HYDROCHLORIDE 0.1 MG: 0.1 TABLET ORAL at 19:05

## 2024-10-20 RX ADMIN — AMLODIPINE BESYLATE 10 MG: 5 TABLET ORAL at 08:32

## 2024-10-20 RX ADMIN — OLANZAPINE 10 MG: 10 TABLET, FILM COATED ORAL at 08:33

## 2024-10-20 RX ADMIN — METOPROLOL SUCCINATE 50 MG: 50 TABLET, EXTENDED RELEASE ORAL at 08:33

## 2024-10-20 RX ADMIN — LISINOPRIL 40 MG: 10 TABLET ORAL at 08:32

## 2024-10-20 RX ADMIN — ATORVASTATIN CALCIUM 40 MG: 20 TABLET, FILM COATED ORAL at 08:33

## 2024-10-20 RX ADMIN — CINACALCET 30 MG: 30 TABLET ORAL at 08:33

## 2024-10-20 ASSESSMENT — ACTIVITIES OF DAILY LIVING (ADL)
ADLS_ACUITY_SCORE: 28
HYGIENE/GROOMING: INDEPENDENT
ADLS_ACUITY_SCORE: 28
ADLS_ACUITY_SCORE: 28
ORAL_HYGIENE: INDEPENDENT
ADLS_ACUITY_SCORE: 28
ADLS_ACUITY_SCORE: 28
DRESS: SCRUBS (BEHAVIORAL HEALTH);INDEPENDENT
ADLS_ACUITY_SCORE: 28
ADLS_ACUITY_SCORE: 28
ORAL_HYGIENE: INDEPENDENT
ADLS_ACUITY_SCORE: 28
HYGIENE/GROOMING: HANDWASHING;INDEPENDENT
ADLS_ACUITY_SCORE: 28
DRESS: INDEPENDENT

## 2024-10-20 NOTE — PLAN OF CARE
Problem: Adult Behavioral Health Plan of Care  Goal: Plan of Care Review  Outcome: Not Progressing  Flowsheets  Taken 10/20/2024 1824  Plan of Care Reviewed With: patient  Overall Patient Progress: no change  Patient Agreement with Plan of Care: unable to participate  Taken 10/20/2024 1736  Patient Agreement with Plan of Care: unable to participate     Problem: Adult Behavioral Health Plan of Care  Goal: Absence of New-Onset Illness or Injury  Intervention: Identify and Manage Fall Risk  Recent Flowsheet Documentation  Taken 10/20/2024 1736 by Torito Cruz RN  Safety Measures:   environmental rounds completed   safety rounds completed   Goal Outcome Evaluation:    Plan of Care Reviewed With: patient Plan of Care Reviewed With: patient    Overall Patient Progress: no changeOverall Patient Progress: no change      Unable to participate in MH assessment, denied pain or discomfort. Adequate food and fluids intake. Medication administered with a lot of encouragement/reapproaching. Patient remains on SIO 2:1, he continues to loiter at exit doors, trying door handles, attempted to kick door a few times. Constant reorientation and redirections not always effective. Confused, disoriented to place, time and situation. Had poor boundaries, severely intrusive, wondered in other patient's room several times. Increased restlessness and agitation most after 2000, attempting to hit when told not to go into other patient' room. Extreme visual hallucinations observed. At 2155, DARREN team involved to deescalate, difficult to redirect or reorient as he is confused, refused offered PRN Zyprexa and Trazodone. Patient became calmer, redirected to his room.

## 2024-10-20 NOTE — PROGRESS NOTES
Endocrine Progress Note  Patient: Estevan Aaron   MRN: [id number]  Date of Service: 10/19/2024    Patient has received cinacalcet 30 mg twice day for the past few days. Calcium is trending down to  normal, yesterday.         Lab reviewed   Latest Reference Range & Units 10/13/24 08:05 10/14/24 12:41 10/16/24 14:15 10/17/24 08:09 10/19/24 07:46   Calcium 8.8 - 10.4 mg/dL 12.5 (H) 11.4 (H) 10.3 10.3 9.8   Albumin 3.5 - 5.2 g/dL 4.6 4.3 4.3 4.1 4.2   (H): Data is abnormally high    Assessment and plan:  Estevan Aaron is a 64 year old  male who was hospitalized on the inpatient psychiatry service: He was admitted from the ER on 10/12/2024 due to concern for homicidal ideation and psychosis. Endocrinology is consulted due to hypercalcemia and hyperparathyroidism     1. Primary Hyperparathyroidism: The patient has a history of mild to moderate hypercalcemia, along with elevated parathyroid hormone levels and upper-normal 24-hour urine calcium excretion, indicating primary hyperparathyroidism. A review of his records in our EHR shows no prior use of lithium.   - calcium trending down to normal, responded well with cinacalcet.  - recommend to reduce cinacalcet to 30 mg daily and recheck calcium Monday.      2. Psychosis: While hypercalcemia can sometimes cause altered mental status or psychosis, it s unclear if this is the cause in this patient. Longstanding mild to moderate hypercalcemia (present since 2015) is generally well-tolerated. However, treatment for primary hyperparathyroidism is advised due to the patient's mental health changes, hypercalcemia severity, and history of kidney stones. Unclear if the patient is able to consent to immediate intervention, such as surgery. Cinacalcet could be an option to manage calcium levels in the short term.      3. History of Kidney Stones     4. Vitamin D Deficiency: Noted in April 2019.18 (10/13/24). Currently on vitamin D3 50,000 international unit(s) weekly. Can reduce to  1000 international unit(s) daily.     Plan:   - Continue cinacalcet to 30 mg   and recheck calcium on Monday  - Continue  vitamin D to 1000 international unit(s) daily.  - Endocrinology will follow up peripherally      Erica Alvarez MD   Endocrinology Fellow   Page # [phone]  On Vocera    Case was discussed and reviewed with Endocrinology Attending Dr. David Hull
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
## 2024-10-20 NOTE — PLAN OF CARE
Problem: Sleep Disturbance  Goal: Adequate Sleep/Rest  Outcome: Not Progressing     Problem: Depression  Goal: Improved Mood  Outcome: Progressing  Intervention: Monitor and Manage Depressive Symptoms  Recent Flowsheet Documentation  Taken 10/20/2024 1200 by Rocío Curry RN  Supportive Measures:   active listening utilized   positive reinforcement provided   self-care encouraged   Goal Outcome Evaluation:  Pt is intermittently visible in the milieu. His appetite is adequate. He consumes 100% of his meals. Pt was medication compliant. He was asking about his medications and counting the tablets that were on the cup. Pt was provided with explanations which he appear to accept without much understanding and took the medications. Pt warmed up to this writer when asked about fishing. He  then talked about  how much they catch,  fishing tournament and that he sold his boat. Speech was clear and logical at this time. He was polite and appreciative with staff who helped him with his oatmeal. When pt went back to his room, he was observed to be fixing a line or reeling a fishing line with his fingers.  He appears to be responding to internal stimuli. Pt continues to be confused and  disorganized. He loiters around doorways and  to tries  to open doors. He is aware that badges open to doors.  He walks about with his folder on hand. Continues to ask for his phone.      During visiting hours, pt bolted for the OT room, said he was  thinking that he saw his ex wife going there. Staff redirected him and he went out of the OT room. Pt stayed by the main doorway for a long time and was again trying for the doors. On redirection, he yelled at staff. PRN offered. Pt declined. After a while, he  finally agreed to go back to his area and sat outside his room.      Pt on 2:1 SIO for severe intrusiveness.

## 2024-10-21 LAB
ALBUMIN SERPL BCG-MCNC: 4.5 G/DL (ref 3.5–5.2)
CALCIUM SERPL-MCNC: 10.6 MG/DL (ref 8.8–10.4)
HOLD SPECIMEN: NORMAL

## 2024-10-21 PROCEDURE — 124N000002 HC R&B MH UMMC

## 2024-10-21 PROCEDURE — 82040 ASSAY OF SERUM ALBUMIN: CPT

## 2024-10-21 PROCEDURE — 99232 SBSQ HOSP IP/OBS MODERATE 35: CPT | Mod: GC | Performed by: STUDENT IN AN ORGANIZED HEALTH CARE EDUCATION/TRAINING PROGRAM

## 2024-10-21 PROCEDURE — 36415 COLL VENOUS BLD VENIPUNCTURE: CPT

## 2024-10-21 PROCEDURE — 87476 LYME DIS DNA AMP PROBE: CPT

## 2024-10-21 PROCEDURE — 250N000013 HC RX MED GY IP 250 OP 250 PS 637

## 2024-10-21 PROCEDURE — 82310 ASSAY OF CALCIUM: CPT

## 2024-10-21 RX ORDER — OLANZAPINE 20 MG/1
20 TABLET, ORALLY DISINTEGRATING ORAL AT BEDTIME
Status: DISCONTINUED | OUTPATIENT
Start: 2024-10-21 | End: 2024-10-30

## 2024-10-21 RX ADMIN — AMLODIPINE BESYLATE 10 MG: 5 TABLET ORAL at 08:41

## 2024-10-21 RX ADMIN — OLANZAPINE 20 MG: 20 TABLET, ORALLY DISINTEGRATING ORAL at 20:09

## 2024-10-21 RX ADMIN — LISINOPRIL 40 MG: 10 TABLET ORAL at 08:41

## 2024-10-21 RX ADMIN — CINACALCET 30 MG: 30 TABLET ORAL at 08:41

## 2024-10-21 RX ADMIN — Medication 1 PATCH: at 08:56

## 2024-10-21 RX ADMIN — METOPROLOL SUCCINATE 50 MG: 50 TABLET, EXTENDED RELEASE ORAL at 08:42

## 2024-10-21 RX ADMIN — ATORVASTATIN CALCIUM 40 MG: 20 TABLET, FILM COATED ORAL at 08:42

## 2024-10-21 RX ADMIN — FUROSEMIDE 40 MG: 40 TABLET ORAL at 08:56

## 2024-10-21 RX ADMIN — OLANZAPINE 10 MG: 10 TABLET, FILM COATED ORAL at 08:42

## 2024-10-21 RX ADMIN — CLONIDINE HYDROCHLORIDE 0.1 MG: 0.1 TABLET ORAL at 20:09

## 2024-10-21 RX ADMIN — CLONIDINE HYDROCHLORIDE 0.1 MG: 0.1 TABLET ORAL at 08:42

## 2024-10-21 ASSESSMENT — ACTIVITIES OF DAILY LIVING (ADL)
ADLS_ACUITY_SCORE: 28
HYGIENE/GROOMING: INDEPENDENT
ADLS_ACUITY_SCORE: 28
ORAL_HYGIENE: INDEPENDENT
ADLS_ACUITY_SCORE: 28
LAUNDRY: WITH SUPERVISION
ADLS_ACUITY_SCORE: 28
ADLS_ACUITY_SCORE: 28
ORAL_HYGIENE: INDEPENDENT
ADLS_ACUITY_SCORE: 28
HYGIENE/GROOMING: INDEPENDENT
ADLS_ACUITY_SCORE: 28
ADLS_ACUITY_SCORE: 28
DRESS: INDEPENDENT
DRESS: INDEPENDENT
ADLS_ACUITY_SCORE: 28

## 2024-10-21 NOTE — PLAN OF CARE
Pt remains on SIO 2:1 for severe intrusiveness. Pt was up at the beginning of the shift. Pt attempted a couple to times to enter  peers rooms, staff would timely intervene. Pt noted to be agitated and confused whenever staff redirected or reoriented him. Pt refused PRN Zyprexa and Trazodone offered. Denied pain; refused to answer any other MH assessment questions. Safety checks completed every 15 minutes. Pt was able to fall asleep towards the end of the shift; appears to have slept a total of 3.75 hours. Will continue to monitor and offer support.    Problem: Psychotic Signs/Symptoms  Goal: Improved Behavioral Control (Psychotic Signs/Symptoms)  Outcome: Not Progressing   Goal Outcome Evaluation:

## 2024-10-21 NOTE — PLAN OF CARE
BEH IP Unit Acuity Rating Score (UARS)  Patient is given one point for every criteria they meet.    CRITERIA SCORING   On a 72 hour hold, court hold, committed, stay of commitment, or revocation. 1    Patient LOS on BEH unit exceeds 20 days. 0  LOS: 9   Patient under guardianship, 55+, otherwise medically complex, or under age 11. 1   Suicide ideation without relief of precipitating factors. 0   Current plan for suicide. 0   Current plan for homicide. 0   Imminent risk or actual attempt to seriously harm another without relief of factors precipitating the attempt. 1   Severe dysfunction in daily living (ex: complete neglect for self care, extreme disruption in vegetative function, extreme deterioration in social interactions). 1   Recent (last 7 days) or current physical aggression in the ED or on unit. 0   Restraints or seclusion episode in past 72 hours. 0   Recent (last 7 days) or current verbal aggression, agitation, yelling, etc., while in the ED or unit. 1 - last 10/20   Active psychosis. 1   Need for constant or near constant redirection (from leaving, from others, etc).  1   Intrusive or disruptive behaviors. 1   Patient requires 3 or more hours of individualized nursing care per 8-hour shift (i.e. for ADLs, meds, therapeutic interventions). 1   TOTAL 9

## 2024-10-21 NOTE — PLAN OF CARE
Goal Outcome Evaluation:    Plan of Care Reviewed With: patient      Problem: Sleep Disturbance  Goal: Adequate Sleep/Rest  Outcome: Not Progressing     Problem: Adult Behavioral Health Plan of Care  Goal: Absence of New-Onset Illness or Injury  Outcome: Progressing  Intervention: Identify and Manage Fall Risk  Recent Flowsheet Documentation  Taken 10/21/2024 1100 by Rocío Curry RN  Safety Measures:   environmental rounds completed   safety rounds completed     Pt is intermittently visible in the milieu. He comes out for meals. Nutrition and hydration is adequate.  He is medication compliant. Pt continues to be confused and appears to  have visual hallucinations. Pt tangential in speech. Pt unable to participate with assessment.  He slept for 3.75 hours last night and he was sleepy in the morning. Pt able to take a long nap this morning. Pt is pleasant when he got up from his nap. After lunch, pt got back to his room and slept.

## 2024-10-21 NOTE — PROGRESS NOTES
"  ----------------------------------------------------------------------------------------------------------  [facility]  Psychiatry Progress Note  Hospital Day #9     Interim History:     The patient's care was discussed with the treatment team and chart notes were reviewed.    Vitals: VSS, intermittently bradycardic to 50s   Sleep: 4.75 hours (10/20/24 0636)  Scheduled medications: Took all scheduled medications as prescribed  Psychiatric PRN medications:   Zyprexa 5 mg x 1  on Saturday     Staff Report:   - Continues to loiter near doors and will enter any open door   - Kicked the door multiple times  - Appears to be going through the movements of fishing  - Disoriented to time, place, situation  - Requires frequent redirection   - Possibly having visual hallucinations    Please see staff notes for details      Subjective:     Patient Interview:   Estevan Aaron was interviewed in his room. Reports his mood is \"okay.\" Tells this writer the weekend was fine, he enjoyed watching TV. Tells this writer the date is February 19th, 2024. Says this building is a \"walk-out rambler\" then correctly identifies it as a hospital when given options by interviewer. Reports sleep is good. Tells this writer he is getting \"pissed off\" that he cannot have his phone and he's getting anxiety talking about it. Upon discussing his behaviors trying to leave the unit/ entering other patient's rooms he says \"I'm a 64 year old boy so that was kind of fun.\" Denies AVH, but then tells this writer to come watch something that is moving inside his shelf. Unwilling to elaborate on what he is seeing. Tells this writer there are a bunch of critters in this place. Agreeable to increasing nighttime Zyprexa. Tells this writer \"there's nothing wrong with me and I could go back to work anytime.\" States \"Im not going to any mental hospital.\" At the end of the interview, tells this writer \"I wish you'd hang " "out in the mornings. See that has chipmunks [points to shelves]. See it's there.\"    ROS:  Negative except for above.      Objective:     Vitals:  /64 (BP Location: Right arm, Patient Position: Sitting, Cuff Size: Adult Regular)   Pulse 83   Temp 97.5  F (36.4  C) (Temporal)   Resp 18   Wt 104.1 kg (229 lb 9.6 oz)   SpO2 98%   BMI 37.06 kg/m      Allergies:  No Known Allergies    Current Medications:  Scheduled:  Current Facility-Administered Medications   Medication Dose Route Frequency Provider Last Rate Last Admin    acetaminophen (TYLENOL) tablet 650 mg  650 mg Oral Q4H PRN Nikki Caceres MD   650 mg at 10/17/24 0837    alum & mag hydroxide-simethicone (MAALOX) suspension 30 mL  30 mL Oral Q4H PRN Nikki Caceres MD   30 mL at 10/17/24 0837    amLODIPine (NORVASC) tablet 10 mg  10 mg Oral Daily Presley Barrera MD   10 mg at 10/20/24 0832    atorvastatin (LIPITOR) tablet 40 mg  40 mg Oral Daily Nikki Caceres MD   40 mg at 10/20/24 0833    cholecalciferol (VITAMIN D3) capsule 1,250 mcg  1,250 mcg Oral Q7 Days Evelia Grimm DO   1,250 mcg at 10/15/24 1252    cinacalcet (SENSIPAR) tablet 30 mg  30 mg Oral Daily Presley Barrera MD   30 mg at 10/20/24 0833    cloNIDine (CATAPRES) tablet 0.1 mg  0.1 mg Oral BID Presley Barrera MD   0.1 mg at 10/20/24 1905    [Held by provider] FLUoxetine (PROzac) capsule 40 mg  40 mg Oral Daily Nikki Caceres MD        furosemide (LASIX) tablet 40 mg  40 mg Oral Daily Presley Barrera MD   40 mg at 10/20/24 0833    gabapentin (NEURONTIN) capsule 100 mg  100 mg Oral Q6H PRN Nikki Caceres MD        lisinopril (ZESTRIL) tablet 40 mg  40 mg Oral Daily Presley Barrera MD   40 mg at 10/20/24 0832    metoprolol succinate ER (TOPROL XL) 24 hr tablet 50 mg  50 mg Oral Daily Presley Barrera MD   50 mg at 10/20/24 0833    nicotine (NICODERM CQ) 14 MG/24HR 24 hr patch 1 patch  1 patch Transdermal Daily Evelia Grimm DO   1 patch at 10/20/24 0833    nicotine " (NICODERM CQ) 21 MG/24HR 24 hr patch 1 patch  1 patch Transdermal Daily PRN Britt Kang MD   1 patch at 10/13/24 1611    nicotine (NICORETTE) gum 2 mg  2 mg Buccal Q1H PRN González Garcia MD        OLANZapine (zyPREXA) tablet 5 mg  5 mg Oral TID PRN Evelia Grimm DO   5 mg at 10/19/24 1110    Or    OLANZapine (zyPREXA) injection 5 mg  5 mg Intramuscular TID PRN Evelia Grimm DO        OLANZapine (zyPREXA) tablet 10 mg  10 mg Oral QAM Evelia Grimm DO   10 mg at 10/20/24 0833    OLANZapine zydis (zyPREXA) ODT tab 15 mg  15 mg Oral At Bedtime Evelia Grimm DO   15 mg at 10/20/24 1905    polyethylene glycol (MIRALAX) Packet 17 g  17 g Oral Daily PRN Nikki Caceres MD        traZODone (DESYREL) half-tab 25 mg  25 mg Oral At Bedtime PRN Evelia Grimm DO   25 mg at 10/15/24 2108    Or    traZODone (DESYREL) tablet 50 mg  50 mg Oral At Bedtime PRN Evelia Grimm DO         PRN:  Current Facility-Administered Medications   Medication Dose Route Frequency Provider Last Rate Last Admin    acetaminophen (TYLENOL) tablet 650 mg  650 mg Oral Q4H PRN Nikki Caceres MD   650 mg at 10/17/24 0837    alum & mag hydroxide-simethicone (MAALOX) suspension 30 mL  30 mL Oral Q4H PRN Nikki Caceres MD   30 mL at 10/17/24 0837    amLODIPine (NORVASC) tablet 10 mg  10 mg Oral Daily Presley Barrera MD   10 mg at 10/20/24 0832    atorvastatin (LIPITOR) tablet 40 mg  40 mg Oral Daily Nikki Caceres MD   40 mg at 10/20/24 0833    cholecalciferol (VITAMIN D3) capsule 1,250 mcg  1,250 mcg Oral Q7 Days Evelia Grimm DO   1,250 mcg at 10/15/24 1252    cinacalcet (SENSIPAR) tablet 30 mg  30 mg Oral Daily Presley Barrera MD   30 mg at 10/20/24 0833    cloNIDine (CATAPRES) tablet 0.1 mg  0.1 mg Oral BID Presley Barrera MD   0.1 mg at 10/20/24 1905    [Held by provider] FLUoxetine (PROzac) capsule 40 mg  40 mg Oral Daily Nikki Caceres MD        furosemide (LASIX) tablet 40 mg  40 mg Oral Daily Presley Barrera MD   40 mg at 10/20/24  0833    gabapentin (NEURONTIN) capsule 100 mg  100 mg Oral Q6H PRN Nikki Caceres MD        lisinopril (ZESTRIL) tablet 40 mg  40 mg Oral Daily Presley Barrera MD   40 mg at 10/20/24 0832    metoprolol succinate ER (TOPROL XL) 24 hr tablet 50 mg  50 mg Oral Daily Presley Barrera MD   50 mg at 10/20/24 0833    nicotine (NICODERM CQ) 14 MG/24HR 24 hr patch 1 patch  1 patch Transdermal Daily Evelia Grimm DO   1 patch at 10/20/24 0833    nicotine (NICODERM CQ) 21 MG/24HR 24 hr patch 1 patch  1 patch Transdermal Daily PRN Britt Kang MD   1 patch at 10/13/24 1611    nicotine (NICORETTE) gum 2 mg  2 mg Buccal Q1H PRN González Garcia MD        OLANZapine (zyPREXA) tablet 5 mg  5 mg Oral TID PRN Evelia Grimm DO   5 mg at 10/19/24 1110    Or    OLANZapine (zyPREXA) injection 5 mg  5 mg Intramuscular TID PRN Evelia Grimm DO        OLANZapine (zyPREXA) tablet 10 mg  10 mg Oral QAM Evelia Grimm DO   10 mg at 10/20/24 0833    OLANZapine zydis (zyPREXA) ODT tab 15 mg  15 mg Oral At Bedtime Evelia Grimm DO   15 mg at 10/20/24 1905    polyethylene glycol (MIRALAX) Packet 17 g  17 g Oral Daily PRN Nikki Caceres MD        traZODone (DESYREL) half-tab 25 mg  25 mg Oral At Bedtime PRN Evelia Grimm DO   25 mg at 10/15/24 2108    Or    traZODone (DESYREL) tablet 50 mg  50 mg Oral At Bedtime PRN Evelia Grimm DO         Labs and Imaging:  Data this admission:  - CBC unremarkable  - CMP unremarkable  - TSH normal  - UDS negative  - Vit D low  - Hgb A1c 5.9 (10/13/24)  - Lipids unremarkable  - Vit B12 normal  - Folate normal  - Urinalysis unremarkable  - EKG normal sinus rhythm, QTc 390  - Head CT showed no acute changes    Parathyroid hormone:   10/13: 85    Calcium:  10/14: 11.4  10/16: 10.3  10/17: 10.3  10/19: 9.8  10/21: 10.6    Albumin:  10/14: 4.3  10/16: 4.3  10/17: 4.1  10/19: 4.2  10/21: 4.5     Mental Status Exam:     Oriented to:  Oriented to self. Not oriented to date, location, or situation.  General:  Awake and  "Drowsy  Appearance:  appears stated age, in hospital scrubs  Behavior/Attitude:  accusatory, easily distracted, secretive, and suspicious  Eye Contact:  appropriate  Psychomotor: no evidence of tics, dystonia, or tardive dyskinesia, agitated, and restless no catatonia present  Speech:  loud volume/tone and talkative  Language: Fluent in English with appropriate syntax and vocabulary.  Mood:  \"okay\"  Affect:  irritable  Thought Process:  ruminative, looseness of association, thought blocking, disorganized, and confused  Thought Content:  denies HI/SI, endorses visual hallucinations; delusions of paranoia and delusions of grandiosity  Associations:  loose  Insight:  impaired   Judgment:  impaired   Impulse control: decreased  Attention Span:  decreased  Concentration: Distractible  Recent and Remote Memory:  not formally assessed  Fund of Knowledge:  Not assessed  Muscle Strength and Tone: normal  Gait and Station: Normal     Psychiatric Assessment     Estevan Aaron is a 64 year old male with previous psychiatric diagnoses of GEORGINA admitted from the ER on 10/12/2024 due to concern for HI and psychosis in the context of medical issues (hyperthyroidism, hypercalcemia), psychosocial stressors including family dynamics with recent possible divorce. This is the patient's first psychiatric hospitalization and possibly contact with psychiatry. Significant symptoms on admission included delusions of persecution with grandiose beliefs, homicidal ideations, as well as disorganized thinking and behavior. The MSE on admission was pertinent for a thought process which was perseverative, with rambling, looseness of association, circumstantial, tangential, flight of ideals, disorganized, word salad, and incoherent. Psychological contributions to presentation included absent insight at the moment. Social factors contributing to presentation included a possibly poor support system as well as possible marital conflicts that could be " secondary or triggers to symptoms. Biological contributions to presentation included a history of hyperparathyroidism with chronic hypercalcemia per charts.     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 Patient's symptoms of severely disorganized thinking and behavior with delusions fit with a picture of acute to subacute psychosis. Though he has no prior dx of psychosis or past decompensation leading to psychiatric hospitalization, per collateral, in the past has endorsed visual hallucinations 10-15 years ago and in the past has been paranoid/distrusting of others, hence this may not be a first episode psychosis.     Due to patient's prior history of anxiety with possible / speculated comorbid mood components with it and his current treatment with fluoxetine, and considering the patient grossly showed symptoms on interview such as an affect that was irritable, non blunted, and somewhat labile, grandiosity in regards to his profession, flight of ideas, and self-described changes in sleep that has been more poor, primary leading ddx is bipolar I disorder, current manic episode with psychotic features. Additionally, per collateral, he has recently been noted with hyper verbal speech and recently has spent $70,000 on a car in the past month, which is not usual for him. For these reasons, it would be valuable to hold his Fluoxetine at the moment and continue to monitor for symptoms.     Patient's presentation remains unusual in regards to his age, and psychosis/zelalem due to a medical illness such chronic hypercalcemia, his long standing hyperparathyroid disorder, or any  other possible endocrine disorder or syndrome associated with these disorders can be contributing to the patient's symptoms, as case reports in the literature have correlated onsets of psychosis with hypercalcemia or hyperparathyroidism. Patient's calcium returned to normal limits on 10/16, but patient continues to have psychosis.    In summary, the patient's diagnosis is still in evolution. He will likely benefit from further assessment and management this admission. Given that he currently has psychosis, patient warrants inpatient psychiatric hospitalization to maintain his safety.     Psychiatric Plan by Diagnosis      Today's changes:  - Increased olanzapine pm dose from 15 to 20 mg  - Fluoxetine was held on admission, discontinued it today     # Bipolar I Disorder, current manic episode with psychotic features    1. Medications:  - Discontinued PTA fluoxetine 40 mg, consider restarting at a later time   - Olanzapine 10 mg during day and 20 mg at bedtime     2. Pertinent Labs/Monitoring:   - See above     3. Additional Plans:  - Patient will be treated in therapeutic milieu with appropriate individual and group therapies as described     # Unspecified anxiety vs Generalized Anxiety Disorder  - Monitor for symptoms.  - Fluoxetine held due to suspicion of ongoing manic symptoms.     Psychiatric Hospital Course:      Estevan Aaron was admitted to [facility] on a 72 hour hold.   Medications:  PTA fluoxetine was held due to concern for worsening of zelalem   New medications started at the time of admission include Zyprexa.   Increased olanzapine 10 mg at bedtime was to 10 mg BID (10/14)   Increased olanzapine 10 mg BID to 10 mg during day and 15 mg at bedtime (10/15)  10/21: increased Zyprexa pm dose from 15 to 20 mg    Care conference 10/18/24 with daughter Maria C and ex-wife Clifford  - Report that patient has always been paranoid since ex-wife first met him. She describes him always feeling like he is getting ripped  off.   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 They reported once incidence in which he made a bid on a job and the client paid him, but he never ended up finishing the job  - Wife and him  officially this year, and since selling the house several months ago, patient has been moving from hotel to hotel due to thinking people are out to get him   - When they were selling their house, patient threatened realtors and felt he was being ripped off   - Clifford reports that she started to be afraid to be around him alone  - When he was found in the hotel, he had a generator full of gasoline, binoculars, bullets, and hunting equipment.    10/21/24: updated daughter on Sanots's treatment plan     The risks, benefits, alternatives, and side effects were discussed and understood by the patient and other caregivers.     Medical Assessment and Plan     Medical diagnoses to be addressed this admission:    # Hypertension  - Continue PTA medications  Furosemide 40 mg daily  Lisinopril 40 mg daily  Metoprolol 50 mg daily  Amlodipine 10 mg daily  Clonidine 0.1 mg BID     # Hyperlipidemia  - Continue PTA Atorvastatin 40 mg     # Hyperparathyroidism  # Hypercalcemia, hypophosphoremia   Increased Ca level to 10.9 and decreased Ph level to 2.3 in the ED. Suspicion patient's hypercalcemia could be contributing to symptoms of psychosis.  - Consulted endocrinology, who started cinacalcet 30 mg BID on 10/13  - 10/16 endocrinology recommended continuing to trend calcium and albumin to make sure patient does not  become hypocalcemic and recommended holding cinacalcet   - 10/17, calcium and albumin wnl, endo recommended cinacalcet 30 mg once daily   - 10/21, per endo continue cincaclcet and recheck calcium and albumin on October 24     Medical course: Patient was physically examined by the ED prior to being transferred to the unit and was found to be medically stable and appropriate for admission.      Consults: Psychiatry, Endocrinology (follow-up of hyperthyroidism / hypercalcemia and hypertension)     Checklist     Legal Status: Court hold     Safety Assessment:   Behavioral Orders   Procedures    Assault precautions    Code 1 - Restrict to Unit    Elopement precautions    Routine Programming     As clinically indicated    Status 15     Every 15 minutes.    Status Individual Observation     2:1 SIO    Patient SIO status reviewed with team/RN.  Please also refer to RN/team documentation for add'l detail.    -SIO staff to monitor following which have contributed to patient being on SIO:  Pt has psychosis regarding being followed and attacked, very paranoid, HI    -Possible interventions SIO staff could use to support patient's treatment progress:  Redirect and reassure  -When following observed, team will review discontinuation of SIO:  Psychosis improves     Order Specific Question:   CONTINUOUS 24 hours / day     Answer:   Other     Order Specific Question:   Specify distance     Answer:   10 feet, 5 feet when close to the doors     Order Specific Question:   Indications for SIO     Answer:   Assault risk     Order Specific Question:   Indications for SIO     Answer:   Severe intrusiveness     Risk Assessment:  Risk for harm is elevated.  Risk factors: SI, HI, impulsive, and past behaviors  Protective factors: family     SIO: 2:1    Disposition: Pending stabilization, medication optimization, & development of a safe discharge plan.      Attestations     This patient was seen and discussed with my attending  physician.  Presley Barrera MD  PGY-1 Psychiatry Resident

## 2024-10-21 NOTE — PROGRESS NOTES
Endocrine Progress Note  Patient: Estevan Aaron   MRN: [id number]  Date of Service: 10/19/2024    Patient has received cinacalcet 30 mg. Calcium was trending down to  normal, 2 days ago, now slight trending up  calcium 10.6      Lab reviewed   Latest Reference Range & Units 10/14/24 12:41 10/16/24 14:15 10/17/24 08:09 10/19/24 07:46 10/21/24 07:52   Calcium 8.8 - 10.4 mg/dL 11.4 (H) 10.3 10.3 9.8 10.6 (H)   Albumin 3.5 - 5.2 g/dL 4.3 4.3 4.1 4.2 4.5   (H): Data is abnormally high    Assessment and plan:  Estevan Aaron is a 64 year old  male who was hospitalized on the inpatient psychiatry service: He was admitted from the ER on 10/12/2024 due to concern for homicidal ideation and psychosis. Endocrinology is consulted due to hypercalcemia and hyperparathyroidism     1. Primary Hyperparathyroidism: The patient has a history of mild to moderate hypercalcemia, along with elevated parathyroid hormone levels and upper-normal 24-hour urine calcium excretion, indicating primary hyperparathyroidism. A review of his records in our EHR shows no prior use of lithium.   - calcium trending down to normal, now trending up. Will continue monitoring.  - Continue with cinacalcet to 30 mg daily and recheck calcium and albumin in 3 days.      2. Psychosis: While hypercalcemia can sometimes cause altered mental status or psychosis, it s unclear if this is the cause in this patient. Longstanding mild to moderate hypercalcemia (present since 2015) is generally well-tolerated. However, treatment for primary hyperparathyroidism is advised due to the patient's mental health changes, hypercalcemia severity, and history of kidney stones. Unclear if the patient is able to consent to immediate intervention, such as surgery. Cinacalcet could be an option to manage calcium levels in the short term, then follow up outpatient for further studies.      3. History of Kidney Stones     4. Vitamin D Deficiency: Noted in April 2019.18 (10/13/24).  Currently on vitamin D3 50,000 international unit(s) weekly, then was reduced to 1000 international unit(s) daily.     Plan:   - Continue cinacalcet to 30 mg   and recheck calcium on Friday. If patient is discharged before please let us know, patient will need follow-up endocrinology outpatient for further primary parathyroid work up.   - Continue  vitamin D to 1000 international unit(s) daily  - Endocrinology will follow up peripherally      Erica Alvarez MD   Endocrinology Fellow   Page # [phone]  On Vocera    Case was discussed and reviewed with Endocrinology Attending Dr. Briseyda Persaud

## 2024-10-21 NOTE — PLAN OF CARE
Team Note Due:  Monday    Assessment/Intervention/Current Symtoms and Care Coordination:  Chart review and met with team, discussed pt progress, symptomology, and response to treatment.  Discussed the discharge plan and any potential impediments to discharge.    Received call from PPS regarding questions about POA and emergency guardianship/conservatorship. Pt's ex-wife planned to visit on Wednesday to have pt sign POA forms but given pt's capacity being in question with civil commitment being petitioned, pt would not be able to consent to POA. Updated pt's ex-wife on this and discussed routes for emergency access to assist with bills, etc.     Discharge Plan or Goal:  TBD - patient has been living out of hotels for the last 2 months but may be able to stay with one of his daughters upon discharge.     Barriers to Discharge:  Patient requires further psychiatric stabilization due to current symptomology, medication management with changes subject to provider, coordination with outside supports, and aftercare planning. A petition for MI Commitment/Ortega has been filed.     Referral Status:  None at this time     Legal Status:  Court hold    Petition for MI Commitment and Ortega filed 10/14/24  [facility]  File Number: [id number]  Start and expiration date of commitment: [facility]    Ortega meds requested: Haldol, Prolixin, Clozaril, Risperdal, Invega, Zyprexa, Seroquel, Abilify    Future Court Hearings:  Final Hearing: Thursday 10/24 at [facility]  Ortega Hearing: Thursday 10/31 at [facility]    PPS:  Shelby Chowdary: [phone]  [email or web address].    Contacts:  Maria C Agnieszka (Daughter): [phone]   Clifford Agnieszka (ex-wife): [phone]      Upcoming Meetings and Dates/Important Information and next steps:  Follow commitment process  Send Treating Physician Recommendation form to PPS by Wedneday 10/23 at 12pm for final hearing.
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
## 2024-10-22 PROCEDURE — 250N000013 HC RX MED GY IP 250 OP 250 PS 637

## 2024-10-22 PROCEDURE — 124N000002 HC R&B MH UMMC

## 2024-10-22 PROCEDURE — 99232 SBSQ HOSP IP/OBS MODERATE 35: CPT | Mod: GC | Performed by: STUDENT IN AN ORGANIZED HEALTH CARE EDUCATION/TRAINING PROGRAM

## 2024-10-22 RX ADMIN — ATORVASTATIN CALCIUM 40 MG: 20 TABLET, FILM COATED ORAL at 08:37

## 2024-10-22 RX ADMIN — FUROSEMIDE 40 MG: 40 TABLET ORAL at 08:37

## 2024-10-22 RX ADMIN — Medication 1 PATCH: at 08:50

## 2024-10-22 RX ADMIN — LISINOPRIL 40 MG: 10 TABLET ORAL at 08:37

## 2024-10-22 RX ADMIN — Medication 1250 MCG: at 11:24

## 2024-10-22 RX ADMIN — METOPROLOL SUCCINATE 50 MG: 50 TABLET, EXTENDED RELEASE ORAL at 08:37

## 2024-10-22 RX ADMIN — OLANZAPINE 20 MG: 20 TABLET, ORALLY DISINTEGRATING ORAL at 19:54

## 2024-10-22 RX ADMIN — CLONIDINE HYDROCHLORIDE 0.1 MG: 0.1 TABLET ORAL at 19:54

## 2024-10-22 RX ADMIN — AMLODIPINE BESYLATE 10 MG: 5 TABLET ORAL at 08:36

## 2024-10-22 RX ADMIN — Medication 25 MG: at 22:25

## 2024-10-22 RX ADMIN — OLANZAPINE 10 MG: 10 TABLET, FILM COATED ORAL at 08:37

## 2024-10-22 RX ADMIN — CLONIDINE HYDROCHLORIDE 0.1 MG: 0.1 TABLET ORAL at 08:37

## 2024-10-22 RX ADMIN — CINACALCET 30 MG: 30 TABLET ORAL at 08:37

## 2024-10-22 ASSESSMENT — ACTIVITIES OF DAILY LIVING (ADL)
ADLS_ACUITY_SCORE: 28
ORAL_HYGIENE: INDEPENDENT
LAUNDRY: WITH SUPERVISION
ADLS_ACUITY_SCORE: 28
ADLS_ACUITY_SCORE: 28
HYGIENE/GROOMING: INDEPENDENT
ADLS_ACUITY_SCORE: 28
HYGIENE/GROOMING: INDEPENDENT
DRESS: INDEPENDENT
ADLS_ACUITY_SCORE: 28
DRESS: INDEPENDENT
ADLS_ACUITY_SCORE: 28
ORAL_HYGIENE: INDEPENDENT
ADLS_ACUITY_SCORE: 28

## 2024-10-22 NOTE — PLAN OF CARE
Problem: Adult Behavioral Health Plan of Care  Goal: Absence of New-Onset Illness or Injury  Outcome: Progressing  Intervention: Identify and Manage Fall Risk  Recent Flowsheet Documentation  Taken 10/22/2024 1400 by Rocío Curry RN  Safety Measures:   environmental rounds completed   safety rounds completed     Problem: Suicidal Behavior  Goal: Suicidal Behavior is Absent or Managed  10/22/2024 1447 by Rocío Curry RN  Outcome: Progressing  10/22/2024 1446 by Rocío Curry RN  Outcome: Progressing     Problem: Suicidal Behavior  Goal: Suicidal Behavior is Absent or Managed  10/22/2024 1447 by Rocío Curry RN  Outcome: Progressing  10/22/2024 1446 by Rocío Curry RN  Outcome: Progressing   Goal Outcome Evaluation:    Plan of Care Reviewed With: patient    Pt continues to be disorganized and confused. He asks for his phone  and tries for the doors.  When he is redirected, pt gets agitated. Speech is tangential and he can be heard rambling. Pt has tactile hallucinations, like he is fixing a fishing line and he is observed  handing something to SIO staff.  When given medications, pt looks at the wrappings and questions the quantity, complains about taking a lot of medications. BP elevated in the morning managed with scheduled BP medications. Pt continues to loiter around doors. Pt appetite is good, pt is eating and drinking adequately.

## 2024-10-22 NOTE — PLAN OF CARE
Team Note Due:  Monday    Assessment/Intervention/Current Symtoms and Care Coordination:  Chart review and met with team, discussed pt progress, symptomology, and response to treatment.  Discussed the discharge plan and any potential impediments to discharge.    Pt's daughter sent FMLA forms requesting MD complete. Forms given to MD.    Received call from Dr. Hinojosa (county examiner) and provided brief update. He intends to call and speak with pt as well.    Discharge Plan or Goal:  TBD - patient has been living out of hotels for the last 2 months but may be able to stay with one of his daughters upon discharge.     Barriers to Discharge:  Patient requires further psychiatric stabilization due to current symptomology, medication management with changes subject to provider, coordination with outside supports, and aftercare planning. A petition for MI Commitment/Ortega has been filed.     Referral Status:  None at this time     Legal Status:  Court hold    Petition for MI Commitment and Ortega filed 10/14/24  [facility] [place]  File Number: [id number]  Start and expiration date of commitment: [facility]    Ortega meds requested: Haldol, Prolixin, Clozaril, Risperdal, Invega, Zyprexa, Seroquel, Abilify    Future Court Hearings:  Final Hearing: Thursday 10/24 at [facility]  Ortega Hearing: Thursday 10/31 at [facility]    PPS:  Shelby Chowdary: [phone]  [email or web address]    Contacts:  Maria C Aaron (Daughter): [phone]   Clifford Aaron (ex-wife): [phone]      Upcoming Meetings and Dates/Important Information and next steps:  Follow commitment process  Send Treating Physician Recommendation form to PPS by Wedneday 10/23 at 12pm for final hearing.
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
## 2024-10-22 NOTE — PLAN OF CARE
BEH IP Unit Acuity Rating Score (UARS)  Patient is given one point for every criteria they meet.    CRITERIA SCORING   On a 72 hour hold, court hold, committed, stay of commitment, or revocation. 1    Patient LOS on BEH unit exceeds 20 days. 0  LOS: 10   Patient under guardianship, 55+, otherwise medically complex, or under age 11. 1   Suicide ideation without relief of precipitating factors. 0   Current plan for suicide. 0   Current plan for homicide. 0   Imminent risk or actual attempt to seriously harm another without relief of factors precipitating the attempt. 1   Severe dysfunction in daily living (ex: complete neglect for self care, extreme disruption in vegetative function, extreme deterioration in social interactions). 1   Recent (last 7 days) or current physical aggression in the ED or on unit. 0   Restraints or seclusion episode in past 72 hours. 0   Recent (last 7 days) or current verbal aggression, agitation, yelling, etc., while in the ED or unit. 1 - last 10/21   Active psychosis. 1   Need for constant or near constant redirection (from leaving, from others, etc).  1   Intrusive or disruptive behaviors. 1   Patient requires 3 or more hours of individualized nursing care per 8-hour shift (i.e. for ADLs, meds, therapeutic interventions). 1   TOTAL 9
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
## 2024-10-22 NOTE — PROGRESS NOTES
"  ----------------------------------------------------------------------------------------------------------  [facility]  Psychiatry Progress Note  Hospital Day #10     Interim History:     The patient's care was discussed with the treatment team and chart notes were reviewed.    Vitals: VSS, intermittently bradycardic to 50s   Sleep: 3.25 hours (10/22/24 0703)  Scheduled medications: Took all scheduled medications as prescribed  Psychiatric PRNs:  - None    Staff Report:   - Yelling to get his belongings because he wants to leave  - Confused and disorganized  - Attempting to enter any open door  - Agitation when redirected     Please see staff notes for details      Subjective:     Patient Interview:   Estevan Aaron was interviewed outside his room. Tells this writer he did not sleep well last night because someone was messing with a board outside his room. Asks if we are going to throw him in treatment. Tells this writer he is going to get dressed and then get out of here. Reports his mood is \"not good\" and he is feeling anxious because he wants out of here. Tells this writer he is in a motel in [place]. Reports it is Wednesday, October 20th and the year is 2024. Tells this writer \"this is a shadow of what's to come.\" Wants to speak to his daughter again before signing [employer] forms for her [facility] paperwork. Accuses the writer of telling him he could have his phone. States \"I want to go hunting. You knew it was for gun hunting.\"     ROS:  Negative except for above.      Objective:     Vitals:  /68   Pulse 57   Temp 98.3  F (36.8  C) (Oral)   Resp 18   Wt 104.1 kg (229 lb 9.6 oz)   SpO2 97%   BMI 37.06 kg/m      Allergies:  No Known Allergies    Current Medications:  Scheduled:  Current Facility-Administered Medications   Medication Dose Route Frequency Provider Last Rate Last Admin    acetaminophen (TYLENOL) tablet 650 mg  650 mg Oral Q4H PRN Nikki Caceres, " MD   650 mg at 10/17/24 0837    alum & mag hydroxide-simethicone (MAALOX) suspension 30 mL  30 mL Oral Q4H PRN Nikki Caceres MD   30 mL at 10/17/24 0837    amLODIPine (NORVASC) tablet 10 mg  10 mg Oral Daily Presley Barrera MD   10 mg at 10/21/24 0841    atorvastatin (LIPITOR) tablet 40 mg  40 mg Oral Daily Nikki Caceres MD   40 mg at 10/21/24 0842    cholecalciferol (VITAMIN D3) capsule 1,250 mcg  1,250 mcg Oral Q7 Days Evelia Grimm DO   1,250 mcg at 10/15/24 1252    cinacalcet (SENSIPAR) tablet 30 mg  30 mg Oral Daily Presley Barrera MD   30 mg at 10/21/24 0841    cloNIDine (CATAPRES) tablet 0.1 mg  0.1 mg Oral BID Presley Barrera MD   0.1 mg at 10/21/24 2009    furosemide (LASIX) tablet 40 mg  40 mg Oral Daily Presley Barrera MD   40 mg at 10/21/24 0856    gabapentin (NEURONTIN) capsule 100 mg  100 mg Oral Q6H PRN Nikki Caceres MD        lisinopril (ZESTRIL) tablet 40 mg  40 mg Oral Daily Presley Barrera MD   40 mg at 10/21/24 0841    metoprolol succinate ER (TOPROL XL) 24 hr tablet 50 mg  50 mg Oral Daily Presley Barrera MD   50 mg at 10/21/24 0842    nicotine (NICODERM CQ) 14 MG/24HR 24 hr patch 1 patch  1 patch Transdermal Daily Evelia Grimm DO   1 patch at 10/21/24 0856    nicotine (NICODERM CQ) 21 MG/24HR 24 hr patch 1 patch  1 patch Transdermal Daily PRN Britt Kang MD   1 patch at 10/13/24 1611    nicotine (NICORETTE) gum 2 mg  2 mg Buccal Q1H PRN González Garcia MD        OLANZapine (zyPREXA) tablet 5 mg  5 mg Oral TID PRN Evelia Grimm DO   5 mg at 10/19/24 1110    Or    OLANZapine (zyPREXA) injection 5 mg  5 mg Intramuscular TID PRN Evelia Grimm DO        OLANZapine (zyPREXA) tablet 10 mg  10 mg Oral QAM Sirisha, Evelia, DO   10 mg at 10/21/24 0842    OLANZapine zydis (zyPREXA) ODT tab 20 mg  20 mg Oral At Bedtime Presley Barrera MD   20 mg at 10/21/24 2009    polyethylene glycol (MIRALAX) Packet 17 g  17 g Oral Daily PRN Nikki Caceres MD        traZODone (DESYREL) half-tab 25 mg   25 mg Oral At Bedtime PRN Evelia Grimm DO   25 mg at 10/15/24 2108    Or    traZODone (DESYREL) tablet 50 mg  50 mg Oral At Bedtime PRN Evelia Grimm DO         PRN:  Current Facility-Administered Medications   Medication Dose Route Frequency Provider Last Rate Last Admin    acetaminophen (TYLENOL) tablet 650 mg  650 mg Oral Q4H PRN Nikki Caceres MD   650 mg at 10/17/24 0837    alum & mag hydroxide-simethicone (MAALOX) suspension 30 mL  30 mL Oral Q4H PRN Nikki Caceres MD   30 mL at 10/17/24 0837    amLODIPine (NORVASC) tablet 10 mg  10 mg Oral Daily Presley Barrera MD   10 mg at 10/21/24 0841    atorvastatin (LIPITOR) tablet 40 mg  40 mg Oral Daily Nikki Caceres MD   40 mg at 10/21/24 0842    cholecalciferol (VITAMIN D3) capsule 1,250 mcg  1,250 mcg Oral Q7 Days Evelia Grimm DO   1,250 mcg at 10/15/24 1252    cinacalcet (SENSIPAR) tablet 30 mg  30 mg Oral Daily Presley Barrera MD   30 mg at 10/21/24 0841    cloNIDine (CATAPRES) tablet 0.1 mg  0.1 mg Oral BID Presley Barrera MD   0.1 mg at 10/21/24 2009    furosemide (LASIX) tablet 40 mg  40 mg Oral Daily Presley Barrera MD   40 mg at 10/21/24 0856    gabapentin (NEURONTIN) capsule 100 mg  100 mg Oral Q6H PRN Nikki Caceres MD        lisinopril (ZESTRIL) tablet 40 mg  40 mg Oral Daily Presley Barrera MD   40 mg at 10/21/24 0841    metoprolol succinate ER (TOPROL XL) 24 hr tablet 50 mg  50 mg Oral Daily Presley Barrera MD   50 mg at 10/21/24 0842    nicotine (NICODERM CQ) 14 MG/24HR 24 hr patch 1 patch  1 patch Transdermal Daily Evelia Grimm DO   1 patch at 10/21/24 0856    nicotine (NICODERM CQ) 21 MG/24HR 24 hr patch 1 patch  1 patch Transdermal Daily PRN Britt Kang MD   1 patch at 10/13/24 1611    nicotine (NICORETTE) gum 2 mg  2 mg Buccal Q1H PRN González Garcia MD        OLANZapine (zyPREXA) tablet 5 mg  5 mg Oral TID PRN Evelia Grimm DO   5 mg at 10/19/24 1110    Or    OLANZapine (zyPREXA) injection 5 mg  5 mg Intramuscular TID PRN  "Evelia Grimm DO        OLANZapine (zyPREXA) tablet 10 mg  10 mg Oral QAM Evelia Grimm DO   10 mg at 10/21/24 0842    OLANZapine zydis (zyPREXA) ODT tab 20 mg  20 mg Oral At Bedtime Presley Barrera MD   20 mg at 10/21/24 2009    polyethylene glycol (MIRALAX) Packet 17 g  17 g Oral Daily PRN Nikki Caceres MD        traZODone (DESYREL) half-tab 25 mg  25 mg Oral At Bedtime PRN Evelia Grimm DO   25 mg at 10/15/24 2108    Or    traZODone (DESYREL) tablet 50 mg  50 mg Oral At Bedtime PRN Evelia Grimm DO         Labs and Imaging:  Data this admission:  - CBC unremarkable  - CMP unremarkable  - TSH normal  - UDS negative  - Vit D low  - Hgb A1c 5.9 (10/13/24)  - Lipids unremarkable  - Vit B12 normal  - Folate normal  - Urinalysis unremarkable  - EKG normal sinus rhythm, QTc 390  - Head CT showed no acute changes    Parathyroid hormone:   10/13: 85    Calcium:  10/14: 11.4  10/16: 10.3  10/17: 10.3  10/19: 9.8  10/21: 10.6    Albumin:  10/14: 4.3  10/16: 4.3  10/17: 4.1  10/19: 4.2  10/21: 4.5     Mental Status Exam:     Oriented to:  Oriented to self. Not oriented to date, location, or situation.  General:  Awake and Drowsy  Appearance:  appears stated age, in hospital scrubs  Behavior/Attitude:  accusatory, easily distracted, secretive, and suspicious  Eye Contact:  appropriate  Psychomotor: no evidence of tics, dystonia, or tardive dyskinesia, agitated, and restless no catatonia present  Speech:  loud volume/tone and talkative  Language: Fluent in English with appropriate syntax and vocabulary.  Mood:  \"okay\"  Affect:  hostile and irritable  Thought Process:  ruminative, looseness of association, thought blocking, disorganized, and confused  Thought Content:  did not assess SI/HI endorses visual hallucinations; delusions of paranoia and delusions of grandiosity  Associations:  loose  Insight:  impaired   Judgment:  impaired   Impulse control: decreased  Attention Span:  decreased  Concentration: Distractible  Recent and " Remote Memory:  not formally assessed  Fund of Knowledge:  Not assessed  Muscle Strength and Tone: normal  Gait and Station: Normal     Psychiatric Assessment     Estevan Aaron is a 64 year old male with previous psychiatric diagnoses of GEORGINA admitted from the ER on 10/12/2024 due to concern for HI and psychosis in the context of medical issues (hyperthyroidism, hypercalcemia), psychosocial stressors including with recent divorce and selling his home. This is the patient's first psychiatric hospitalization. Significant symptoms on admission included delusions of persecution with grandiose beliefs, homicidal ideations, as well as disorganized thinking and behavior. The MSE on admission was pertinent for a thought process which was perseverative, circumstantial, tangential, disorganized and tangential; with rambling and looseness of associations,. Psychological contributions to presentation included lack of insight. Social factors contributing to presentation include isolation, recent divorce, selling his house, and moving from hotel to hotel. Biological contributions to presentation included a history of hyperparathyroidism with chronic hypercalcemia per charts as well as a history of methamphetamine use per collateral from ex-wife.     History was difficult to obtain due to the patient's severe disorganized thinking on interview; he was observed with 1) persecutory delusions pertaining to the realtor and gang members, 2) disorganized behavior as these delusions have led him to flee to different hotels to stay safe/ has called  complaining of being targeted and 3) auditory hallucinations of voices for the past 12 months. Per collateral from ex-wife, Santos has had paranoid ideations since they first met. He has always felt like people are out to get him or trying to rip him off. She says that he has had visual hallucinations (he will point things out that the rest of the family can't see) for a long time, but the  family would just go along with him to avoid making him angry. Things began to worsen around the beginning of the COVID pandemic, when Santos became more isolated and the family started to notice cognitive issues such as impaired memory. Immediately prior to this hospitalization, the ex-wife found Santos in a hotel room with a generator full of gasoline, binoculars, and hunting equipment. This time course does not suggest acute psychosis, however given the patient has never had a full psychiatric work-up, it is reasonable to obtain a first-episode psychosis work-up.     Other things that could be contributing to his presentation is hyperparathyroidism with hypercalcemia. However, patient's calcium returned to normal limits on 10/16, and patient continues to have psychosis.    In summary, the patient's diagnosis is still in evolution. He will likely benefit from further assessment and management this admission. Given that he currently has psychosis, patient warrants inpatient psychiatric hospitalization to maintain his safety.     Psychiatric Plan by Diagnosis      Today's changes:  - None     # Bipolar I Disorder, current manic episode with psychotic features    1. Medications:  - Discontinued PTA fluoxetine 40 mg, consider restarting at a later time   - Olanzapine 10 mg during day and 20 mg at bedtime     2. Pertinent Labs/Monitoring:   - See above     3. Additional Plans:  - Patient will be treated in therapeutic milieu with appropriate individual and group therapies as described     # Unspecified anxiety vs Generalized Anxiety Disorder  - Monitor for symptoms.  - Fluoxetine held due to suspicion of ongoing manic symptoms.     Psychiatric Hospital Course:      Estevan Aaron was admitted to [facility] on a 72 hour hold.   Medications:  PTA fluoxetine was held due to concern for worsening of zelalem   New medications started at the time of admission include Zyprexa.   Increased olanzapine 10 mg at bedtime was to 10 mg BID  (10/14)   Increased olanzapine 10 mg BID to 10 mg during day and 15 mg at bedtime (10/15)  10/21: increased Zyprexa pm dose from 15 to 20 mg    Care conference 10/18/24 with daughter Maria C and ex-wife Clifford  - Report that patient has always been paranoid since ex-wife first met him. She describes him always feeling like he is getting ripped off.   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 They reported once incidence in which he made a bid on a job and the client paid him, but he never ended up finishing the job  - Wife and him  officially this year, and since selling the house several months ago, patient has been moving from hotel to hotel due to thinking people are out to get him   - When they were selling their house, patient threatened realtors and felt he was being ripped off   - Clifford reports that she started to be afraid to be around him alone  - When he was found in the hotel, he had a generator full of gasoline, binoculars, bullets, and hunting equipment.    10/21/24: updated daughter on Santos's treatment plan     The risks, benefits, alternatives, and side effects were discussed and understood by the patient and other caregivers.     Medical Assessment and Plan     Medical diagnoses to be addressed this admission:    # Hypertension  - Continue PTA medications  Furosemide 40 mg daily  Lisinopril 40 mg daily  Metoprolol 50 mg daily  Amlodipine 10 mg daily  Clonidine 0.1 mg BID     # Hyperlipidemia  - Continue PTA Atorvastatin 40 mg     # Hyperparathyroidism  #  Hypercalcemia, hypophosphoremia   Increased Ca level to 10.9 and decreased Ph level to 2.3 in the ED. Suspicion patient's hypercalcemia could be contributing to symptoms of psychosis.  - Consulted endocrinology, who started cinacalcet 30 mg BID on 10/13  - 10/16 endocrinology recommended continuing to trend calcium and albumin to make sure patient does not become hypocalcemic and recommended holding cinacalcet   - 10/17, calcium and albumin wnl, endo recommended cinacalcet 30 mg once daily   - 10/21, per endo continue cincaclcet and recheck calcium and albumin on October 24     Medical course: Patient was physically examined by the ED prior to being transferred to the unit and was found to be medically stable and appropriate for admission.      Consults: Psychiatry, Endocrinology (follow-up of hyperthyroidism / hypercalcemia and hypertension)     Checklist     Legal Status: Court hold     Safety Assessment:   Behavioral Orders   Procedures    Assault precautions    Code 1 - Restrict to Unit    Elopement precautions    Routine Programming     As clinically indicated    Status 15     Every 15 minutes.    Status Individual Observation     1:1 SIO    Patient SIO status reviewed with team/RN.  Please also refer to RN/team documentation for add'l detail.    -SIO staff to monitor following which have contributed to patient being on SIO:  Pt has psychosis regarding being followed and attacked, very paranoid, HI    -Possible interventions SIO staff could use to support patient's treatment progress:  Redirect and reassure  -When following observed, team will review discontinuation of SIO:  Psychosis improves     Order Specific Question:   CONTINUOUS 24 hours / day     Answer:   Other     Order Specific Question:   Specify distance     Answer:   10 feet, 5 feet when close to the doors     Order Specific Question:   Indications for SIO     Answer:   Assault risk     Order Specific Question:   Indications for SIO     Answer:    Severe intrusiveness     Risk Assessment:  Risk for harm is elevated.  Risk factors: SI, HI, impulsive, and past behaviors  Protective factors: family     Disposition: Pending stabilization, medication optimization, & development of a safe discharge plan.      Attestations     This patient was seen and discussed with my attending physician.  Presley Barrera MD  PGY-1 Psychiatry Resident

## 2024-10-23 LAB
ALBUMIN SERPL BCG-MCNC: 4.3 G/DL (ref 3.5–5.2)
B BURGDOR DNA SPEC QL NAA+PROBE: NOT DETECTED
CALCIUM SERPL-MCNC: 10.3 MG/DL (ref 8.8–10.4)
ERYTHROCYTE [SEDIMENTATION RATE] IN BLOOD BY WESTERGREN METHOD: 2 MM/HR (ref 0–20)
HIV 1+2 AB+HIV1 P24 AG SERPL QL IA: NONREACTIVE
T PALLIDUM AB SER QL: NONREACTIVE

## 2024-10-23 PROCEDURE — 250N000013 HC RX MED GY IP 250 OP 250 PS 637

## 2024-10-23 PROCEDURE — 82390 ASSAY OF CERULOPLASMIN: CPT | Performed by: STUDENT IN AN ORGANIZED HEALTH CARE EDUCATION/TRAINING PROGRAM

## 2024-10-23 PROCEDURE — 36415 COLL VENOUS BLD VENIPUNCTURE: CPT | Performed by: STUDENT IN AN ORGANIZED HEALTH CARE EDUCATION/TRAINING PROGRAM

## 2024-10-23 PROCEDURE — 86038 ANTINUCLEAR ANTIBODIES: CPT | Performed by: STUDENT IN AN ORGANIZED HEALTH CARE EDUCATION/TRAINING PROGRAM

## 2024-10-23 PROCEDURE — 85652 RBC SED RATE AUTOMATED: CPT | Performed by: STUDENT IN AN ORGANIZED HEALTH CARE EDUCATION/TRAINING PROGRAM

## 2024-10-23 PROCEDURE — 99232 SBSQ HOSP IP/OBS MODERATE 35: CPT | Mod: GC | Performed by: STUDENT IN AN ORGANIZED HEALTH CARE EDUCATION/TRAINING PROGRAM

## 2024-10-23 PROCEDURE — 87389 HIV-1 AG W/HIV-1&-2 AB AG IA: CPT | Performed by: STUDENT IN AN ORGANIZED HEALTH CARE EDUCATION/TRAINING PROGRAM

## 2024-10-23 PROCEDURE — 82310 ASSAY OF CALCIUM: CPT | Performed by: STUDENT IN AN ORGANIZED HEALTH CARE EDUCATION/TRAINING PROGRAM

## 2024-10-23 PROCEDURE — 82040 ASSAY OF SERUM ALBUMIN: CPT | Performed by: STUDENT IN AN ORGANIZED HEALTH CARE EDUCATION/TRAINING PROGRAM

## 2024-10-23 PROCEDURE — 86780 TREPONEMA PALLIDUM: CPT | Performed by: STUDENT IN AN ORGANIZED HEALTH CARE EDUCATION/TRAINING PROGRAM

## 2024-10-23 PROCEDURE — 124N000002 HC R&B MH UMMC

## 2024-10-23 RX ADMIN — CLONIDINE HYDROCHLORIDE 0.1 MG: 0.1 TABLET ORAL at 19:32

## 2024-10-23 RX ADMIN — ATORVASTATIN CALCIUM 40 MG: 20 TABLET, FILM COATED ORAL at 08:53

## 2024-10-23 RX ADMIN — METOPROLOL SUCCINATE 50 MG: 50 TABLET, EXTENDED RELEASE ORAL at 08:54

## 2024-10-23 RX ADMIN — OLANZAPINE 20 MG: 20 TABLET, ORALLY DISINTEGRATING ORAL at 19:32

## 2024-10-23 RX ADMIN — LISINOPRIL 40 MG: 10 TABLET ORAL at 08:54

## 2024-10-23 RX ADMIN — CLONIDINE HYDROCHLORIDE 0.1 MG: 0.1 TABLET ORAL at 08:54

## 2024-10-23 RX ADMIN — Medication 1 PATCH: at 08:55

## 2024-10-23 RX ADMIN — Medication 3 MG: at 19:32

## 2024-10-23 RX ADMIN — OLANZAPINE 10 MG: 10 TABLET, FILM COATED ORAL at 08:54

## 2024-10-23 RX ADMIN — AMLODIPINE BESYLATE 10 MG: 5 TABLET ORAL at 08:55

## 2024-10-23 RX ADMIN — CINACALCET 30 MG: 30 TABLET ORAL at 08:54

## 2024-10-23 RX ADMIN — FUROSEMIDE 40 MG: 40 TABLET ORAL at 08:54

## 2024-10-23 RX ADMIN — OLANZAPINE 5 MG: 5 TABLET, FILM COATED ORAL at 04:49

## 2024-10-23 ASSESSMENT — ACTIVITIES OF DAILY LIVING (ADL)
ADLS_ACUITY_SCORE: 0
ADLS_ACUITY_SCORE: 0
ORAL_HYGIENE: INDEPENDENT
ADLS_ACUITY_SCORE: 0
DRESS: INDEPENDENT
ADLS_ACUITY_SCORE: 0
ADLS_ACUITY_SCORE: 28
ADLS_ACUITY_SCORE: 13.5
ADLS_ACUITY_SCORE: 0
ADLS_ACUITY_SCORE: 13.5
ADLS_ACUITY_SCORE: 0
ADLS_ACUITY_SCORE: 13.5
HYGIENE/GROOMING: INDEPENDENT
ADLS_ACUITY_SCORE: 13.5
ADLS_ACUITY_SCORE: 13.5
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
## 2024-10-23 NOTE — PLAN OF CARE
BEH IP Unit Acuity Rating Score (UARS)  Patient is given one point for every criteria they meet.    CRITERIA SCORING   On a 72 hour hold, court hold, committed, stay of commitment, or revocation. 1    Patient LOS on BEH unit exceeds 20 days. 0  LOS: 11   Patient under guardianship, 55+, otherwise medically complex, or under age 11. 1   Suicide ideation without relief of precipitating factors. 0   Current plan for suicide. 0   Current plan for homicide. 0   Imminent risk or actual attempt to seriously harm another without relief of factors precipitating the attempt. 1   Severe dysfunction in daily living (ex: complete neglect for self care, extreme disruption in vegetative function, extreme deterioration in social interactions). 1   Recent (last 7 days) or current physical aggression in the ED or on unit. 1 - last 10/22   Restraints or seclusion episode in past 72 hours. 0   Recent (last 7 days) or current verbal aggression, agitation, yelling, etc., while in the ED or unit. 1 - last 10/22   Active psychosis. 1   Need for constant or near constant redirection (from leaving, from others, etc).  1   Intrusive or disruptive behaviors. 1   Patient requires 3 or more hours of individualized nursing care per 8-hour shift (i.e. for ADLs, meds, therapeutic interventions). 1   TOTAL 10

## 2024-10-23 NOTE — PLAN OF CARE
The pt remained on SIO 2:1 for severe intrusiveness during the shift. The pt visible in milieu for most of the shift and presented with a calm mood, and flat/blunted affect. The pt appeared disorganized and tangential in thought, hyper verbal at times, and verbalized frustration about being here. The pt seen loitering around the exit door, denied of all MH concerns and contracted for safety. The pt engaged in playing cards and watching TV with staff as distraction but occasionally wandered around exit door, staff able to redirect the pt without any behavioral escalation. The pt complied med with prompt and multiple attempt during HS.    Problem: Adult Behavioral Health Plan of Care  Goal: Plan of Care Review  Outcome: Progressing  Flowsheets (Taken 10/23/2024 1645)  Plan of Care Reviewed With: patient  Overall Patient Progress: improving  Patient Agreement with Plan of Care: agrees     Problem: Psychotic Signs/Symptoms  Goal: Improved Mood Symptoms (Psychotic Signs/Symptoms)  Outcome: Progressing  Intervention: Optimize Emotion and Mood  Recent Flowsheet Documentation  Taken 10/23/2024 1645 by Jarrod Keenan RN  Supportive Measures:   active listening utilized                        positive reinforcement provided   self-care encouraged                           self-reflection promoted   self-responsibility promoted               verbalization of feelings encouraged  Diversional Activity:   art work            journaling               music   play                   puzzles                    reading   television   Goal Outcome Evaluation:    Plan of Care Reviewed With: patient Plan of Care Reviewed With: patient    Overall Patient Progress: improvingOverall Patient Progress: improving

## 2024-10-23 NOTE — PLAN OF CARE
Goal Outcome Evaluation:    Met pt in the room on taking over, pt is calm and relaxed, dosing off and on. Spent the morning part quietly in his room. Out in the milieu around 1155. Pt is confused, cooperative and redirectable. Presents with flat affect and depressed mood. Med compliant. Spent the rest of the shift in lounge sitting quietly and watching TV, sitting in [address] way close to his room. Continues on SIO. Denies anxiety, depression, SI,HI SIB,AVH. Denied physical discomfort. Pt is eating and drinking in adequate amount. No behavioral issue noted.  Plan: Continue to provide safe environment and therapeutic milieu.        Vital signs:  Temp: 97.6  F (36.4  C) Temp src: Temporal BP: (!) 150/95 Pulse: 61   Resp: 16 SpO2: 98 % O2 Device: None (Room air)
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
## 2024-10-23 NOTE — PROGRESS NOTES
Endocrine Progress Note  Patient: Estevan Aaron   MRN: [id number]  Date of Service: 10/19/2024    Patient has received cinacalcet 30 mg. Calcium was trending down to  normal, 2 days ago, now slight trending up  calcium 10.6      Lab reviewed   Latest Reference Range & Units 10/02/15 10:55 01/05/18 08:13 03/14/19 12:39 04/03/19 10:58 11/03/20 09:35 04/15/21 15:04 01/18/22 08:51 02/17/23 11:59 03/22/23 10:04 09/22/23 12:45 02/20/24 09:33 10/10/24 22:05 10/13/24 08:05 10/14/24 12:41 10/16/24 14:15 10/17/24 08:09 10/19/24 07:46 10/21/24 07:52 10/23/24 08:16   Calcium 8.8 - 10.4 mg/dL 11.1 (H) 11.2 (H) 12.0 (H) 11.0 (H) 11.4 (H) 11.1 (H) 10.9 (H) 10.9 (H) 11.4 (H) 11.0 (H) 11.4 (H) 10.9 (H) 12.5 (H) 11.4 (H) 10.3 10.3 9.8 10.6 (H) 10.3   Albumin 3.5 - 5.2 g/dL        4.7   4.8 4.1 4.6 4.3 4.3 4.1 4.2 4.5 4.3   (H): Data is abnormally high    Assessment and plan:  Estevan Aaron is a 64 year old  male who was hospitalized on the inpatient psychiatry service: He was admitted from the ER on 10/12/2024 due to concern for homicidal ideation and psychosis. Endocrinology is consulted due to hypercalcemia and hyperparathyroidism     1. Primary Hyperparathyroidism: The patient has a history of mild to moderate hypercalcemia, along with elevated parathyroid hormone levels and upper-normal 24-hour urine calcium excretion, indicating primary hyperparathyroidism. A review of his records in our EHR shows no prior use of lithium.   - calcium is stable no need to continue monitoring   - Continue with cinacalcet to 30 mg daily      2. Psychosis: While hypercalcemia can sometimes cause altered mental status or psychosis, it s unclear if this is the cause in this patient. Longstanding mild to moderate hypercalcemia (present since 2015) is generally well-tolerated. However, treatment for primary hyperparathyroidism is advised due to the patient's mental health changes, hypercalcemia severity, and history of kidney stones. Unclear if the  patient is able to consent to immediate intervention, such as surgery. Cinacalcet could be an option to manage calcium levels in the short term, then follow up outpatient for further studies.      3. History of Kidney Stones     4. Vitamin D Deficiency: Noted in April 2019.18 (10/13/24). Currently on vitamin D3 50,000 international unit(s) weekly, then was reduced to 1000 international unit(s) daily.     Plan:   - Continue cinacalcet to 30 mg   and recheck calcium in 1 month with Endocrinology.      Patient will need follow-up endocrinology outpatient for further primary parathyroid work up.   - Continue  vitamin D to 1000 international unit(s) daily  - Endocrinology will sign off.      Erica Alvarez MD   Endocrinology Fellow   Page # [phone]  On Vocera    Case was discussed and reviewed with Endocrinology Attending Dr. Briseyda Persaud

## 2024-10-23 NOTE — PROGRESS NOTES
----------------------------------------------------------------------------------------------------------  [facility]  Psychiatry Progress Note  Hospital Day #11     Interim History:     The patient's care was discussed with the treatment team and chart notes were reviewed.    Vitals: VSS, intermittently bradycardic to 50s   Sleep: 3.25 hours (10/22/24 0703)  Scheduled medications: Took all scheduled medications as prescribed  Psychiatric PRNs:  - Zyprexa 5 mg x 1  - Trazodone 25 mg x 1    Staff Report:   -Continues to ask for his phone  -Trying multiple doors  -Carrying paper bag with his belongings   -Loitered around exit doors  -Entered other patients rooms  -Using his scrubs to clean the floors of other patients' rooms and the hallway   -Was put back on 2:1   -Tried to attack SIO staff    Please see staff notes for details      Subjective:     Patient Interview:   Estevan Aaron was interviewed in his room. Upon approach he was sleeping in bed and difficult to arouse. Later met with patient in the dining room. He reports he doing well. He asked how this writer is doing. Patient knew it was October (thought it was the 24th), but he thought he was in a hotel in [place]. Patient became mildly agitated when he was informed that he still cannot leave the hospital. Patient reports he has been sleeping well, and was surprised when he was told that nursing reports said he did not sleep. Patient was agreeable to trying melatonin.     ROS:  Negative except for above.      Objective:     Vitals:  /62 (BP Location: Left arm, Patient Position: Sitting)   Pulse 61   Temp 97  F (36.1  C) (Oral)   Resp 16   Wt 104.1 kg (229 lb 9.6 oz)   SpO2 97%   BMI 37.06 kg/m      Allergies:  No Known Allergies    Current Medications:  Scheduled:  Current Facility-Administered Medications   Medication Dose Route Frequency Provider Last Rate Last Admin    acetaminophen (TYLENOL)  tablet 650 mg  650 mg Oral Q4H PRN Nikki Caceres MD   650 mg at 10/17/24 0837    alum & mag hydroxide-simethicone (MAALOX) suspension 30 mL  30 mL Oral Q4H PRN Nikki Caceres MD   30 mL at 10/17/24 0837    amLODIPine (NORVASC) tablet 10 mg  10 mg Oral Daily Presley Barrera MD   10 mg at 10/22/24 0836    atorvastatin (LIPITOR) tablet 40 mg  40 mg Oral Daily Nikki Caceres MD   40 mg at 10/22/24 0837    cholecalciferol (VITAMIN D3) capsule 1,250 mcg  1,250 mcg Oral Q7 Days Evelia Grimm DO   1,250 mcg at 10/22/24 1124    cinacalcet (SENSIPAR) tablet 30 mg  30 mg Oral Daily Presley Barrera MD   30 mg at 10/22/24 0837    cloNIDine (CATAPRES) tablet 0.1 mg  0.1 mg Oral BID Presley Barrera MD   0.1 mg at 10/22/24 1954    furosemide (LASIX) tablet 40 mg  40 mg Oral Daily Presley Barrera MD   40 mg at 10/22/24 0837    gabapentin (NEURONTIN) capsule 100 mg  100 mg Oral Q6H PRN Nikki Caceres MD        lisinopril (ZESTRIL) tablet 40 mg  40 mg Oral Daily Presley Barrera MD   40 mg at 10/22/24 0837    metoprolol succinate ER (TOPROL XL) 24 hr tablet 50 mg  50 mg Oral Daily Presley Barrera MD   50 mg at 10/22/24 0837    nicotine (NICODERM CQ) 14 MG/24HR 24 hr patch 1 patch  1 patch Transdermal Daily Evelia Grimm DO   1 patch at 10/22/24 0850    nicotine (NICODERM CQ) 21 MG/24HR 24 hr patch 1 patch  1 patch Transdermal Daily PRN Britt Kang MD   1 patch at 10/13/24 1611    nicotine (NICORETTE) gum 2 mg  2 mg Buccal Q1H PRN González Garcia MD        OLANZapine (zyPREXA) tablet 5 mg  5 mg Oral TID PRN Evelia Grimm DO   5 mg at 10/23/24 0449    Or    OLANZapine (zyPREXA) injection 5 mg  5 mg Intramuscular TID PRN Evelia Grimm DO        OLANZapine (zyPREXA) tablet 10 mg  10 mg Oral QAM Evelia Grimm DO   10 mg at 10/22/24 0837    OLANZapine zydis (zyPREXA) ODT tab 20 mg  20 mg Oral At Bedtime Presley Barrera MD   20 mg at 10/22/24 1954    polyethylene glycol (MIRALAX) Packet 17 g  17 g Oral Daily PRN Nicki,  MD Nikki        traZODone (DESYREL) half-tab 25 mg  25 mg Oral At Bedtime PRN Evelia Grimm DO   25 mg at 10/22/24 2225    Or    traZODone (DESYREL) tablet 50 mg  50 mg Oral At Bedtime PRN Evelia Grimm DO         PRN:  Current Facility-Administered Medications   Medication Dose Route Frequency Provider Last Rate Last Admin    acetaminophen (TYLENOL) tablet 650 mg  650 mg Oral Q4H PRN Nikki Caceres MD   650 mg at 10/17/24 0837    alum & mag hydroxide-simethicone (MAALOX) suspension 30 mL  30 mL Oral Q4H PRN Nikki Caceres MD   30 mL at 10/17/24 0837    amLODIPine (NORVASC) tablet 10 mg  10 mg Oral Daily Presley Barrera MD   10 mg at 10/22/24 0836    atorvastatin (LIPITOR) tablet 40 mg  40 mg Oral Daily Nikki Caceres MD   40 mg at 10/22/24 0837    cholecalciferol (VITAMIN D3) capsule 1,250 mcg  1,250 mcg Oral Q7 Days Evelia Grimm DO   1,250 mcg at 10/22/24 1124    cinacalcet (SENSIPAR) tablet 30 mg  30 mg Oral Daily Presley Barrera MD   30 mg at 10/22/24 0837    cloNIDine (CATAPRES) tablet 0.1 mg  0.1 mg Oral BID Presley Barrera MD   0.1 mg at 10/22/24 1954    furosemide (LASIX) tablet 40 mg  40 mg Oral Daily Presley Barrera MD   40 mg at 10/22/24 0837    gabapentin (NEURONTIN) capsule 100 mg  100 mg Oral Q6H PRN Nikki Caceres MD        lisinopril (ZESTRIL) tablet 40 mg  40 mg Oral Daily Presley Barrera MD   40 mg at 10/22/24 0837    metoprolol succinate ER (TOPROL XL) 24 hr tablet 50 mg  50 mg Oral Daily Presley Barrera MD   50 mg at 10/22/24 0837    nicotine (NICODERM CQ) 14 MG/24HR 24 hr patch 1 patch  1 patch Transdermal Daily Evelia Grimm DO   1 patch at 10/22/24 0850    nicotine (NICODERM CQ) 21 MG/24HR 24 hr patch 1 patch  1 patch Transdermal Daily PRN Britt Kang MD   1 patch at 10/13/24 1611    nicotine (NICORETTE) gum 2 mg  2 mg Buccal Q1H PRN González Garcia MD        OLANZapine (zyPREXA) tablet 5 mg  5 mg Oral TID PRN Evelia Grimm DO   5 mg at 10/23/24 9390    Or     "OLANZapine (zyPREXA) injection 5 mg  5 mg Intramuscular TID PRN Evelia Grimm DO        OLANZapine (zyPREXA) tablet 10 mg  10 mg Oral QAM Evelia Grimm DO   10 mg at 10/22/24 0837    OLANZapine zydis (zyPREXA) ODT tab 20 mg  20 mg Oral At Bedtime Presley Barrera MD   20 mg at 10/22/24 1954    polyethylene glycol (MIRALAX) Packet 17 g  17 g Oral Daily PRN Nikki Caceres MD        traZODone (DESYREL) half-tab 25 mg  25 mg Oral At Bedtime PRN Evelia Grimm DO   25 mg at 10/22/24 2225    Or    traZODone (DESYREL) tablet 50 mg  50 mg Oral At Bedtime PRN Evelia Grimm DO         Labs and Imaging:  Data this admission:  - CBC unremarkable  - CMP unremarkable  - TSH normal  - UDS negative  - Vit D low  - Hgb A1c 5.9 (10/13/24)  - Lipids unremarkable  - Vit B12 normal  - Folate normal  - Urinalysis unremarkable  - EKG normal sinus rhythm, QTc 390  - Head CT showed no acute changes    Parathyroid hormone:   10/13: 85    Calcium:  10/14: 11.4  10/16: 10.3  10/17: 10.3  10/19: 9.8  10/21: 10.6    Albumin:  10/14: 4.3  10/16: 4.3  10/17: 4.1  10/19: 4.2  10/21: 4.5     Mental Status Exam:     Oriented to:  Oriented to self and month. Not oriented to location, or situation.  General:  Awake and Drowsy  Appearance:  appears stated age, in hospital scrubs  Behavior/Attitude:  easily distracted, secretive, and suspicious  Eye Contact:  appropriate  Psychomotor: no evidence of tics, dystonia, or tardive dyskinesia, agitated, and restless no catatonia present  Speech:  appropriate volume/tone  Language: Fluent in English with appropriate syntax and vocabulary.  Mood:  \"okay\"  Affect:  hostile and irritable  Thought Process:  ruminative, looseness of association, thought blocking, disorganized, and confused  Thought Content:  did not assess SI/HI endorses visual hallucinations; delusions of paranoia and delusions of grandiosity  Associations:  loose  Insight:  impaired   Judgment:  impaired   Impulse control: decreased  Attention Span:  " decreased  Concentration: Distractible  Recent and Remote Memory:  not formally assessed  Fund of Knowledge:  Not assessed  Muscle Strength and Tone: normal  Gait and Station: Normal     Psychiatric Assessment     Estevan Aaron is a 64 year old male with previous psychiatric diagnoses of GEORGINA admitted from the ER on 10/12/2024 due to concern for HI and psychosis in the context of medical issues (hyperthyroidism, hypercalcemia), psychosocial stressors including with recent divorce and selling his home. This is the patient's first psychiatric hospitalization. Significant symptoms on admission included delusions of persecution with grandiose beliefs, homicidal ideations, as well as disorganized thinking and behavior. The MSE on admission was pertinent for a thought process which was perseverative, circumstantial, tangential, disorganized and tangential; with rambling and looseness of associations,. Psychological contributions to presentation included lack of insight. Social factors contributing to presentation include isolation, recent divorce, selling his house, and moving from hotel to hotel. Biological contributions to presentation included a history of hyperparathyroidism with chronic hypercalcemia per charts as well as a history of methamphetamine use per collateral from ex-wife.     History was difficult to obtain due to the patient's severe disorganized thinking on interview; he was observed with 1) persecutory delusions pertaining to the realtor and gang members, 2) disorganized behavior as these delusions have led him to flee to different hotels to stay safe/ has called  complaining of being targeted and 3) auditory hallucinations of voices for the past 12 months. Per collateral from ex-wife, Snatos has had paranoid ideations since they first met. He has always felt like people are out to get him or trying to rip him off. She says that he has had visual hallucinations (he will point things out that the rest of  the family can't see) for a long time, but the family would just go along with him to avoid making him angry. Things began to worsen around the beginning of the COVID pandemic, when Santos became more isolated and the family started to notice cognitive issues such as impaired memory. Immediately prior to this hospitalization, the ex-wife found Santos in a hotel room with a generator full of gasoline, binoculars, and hunting equipment. This time course does not suggest acute psychosis, however given the patient has never had a full psychiatric work-up, it is reasonable to obtain a first-episode psychosis work-up.     Other things that could be contributing to his presentation is hyperparathyroidism with hypercalcemia. However, patient's calcium returned to normal limits on 10/16, and patient continues to have psychosis.    Patient also continues to be disoriented and his behaviors appear to worsen in the evenings. This raises concern for underlying neurocognitive disorder. We are continuing to rule out other possible etiologies and will attempt to get brain imaging when patient is more stable and can tolerate MRI.     In summary, the patient's diagnosis is still in evolution. He will likely benefit from further assessment and management this admission. Given that he currently has psychosis, patient warrants inpatient psychiatric hospitalization to maintain his safety.     Psychiatric Plan by Diagnosis      Today's changes:  - Schedule melatonin 3 mg at 7 pm      # Bipolar I Disorder, current manic episode with psychotic features    1. Medications:  - Discontinued PTA fluoxetine 40 mg, consider restarting at a later time   - Olanzapine 10 mg during day and 20 mg at bedtime     2. Pertinent Labs/Monitoring:   - See above     3. Additional Plans:  - Patient will be treated in therapeutic milieu with appropriate individual and group therapies as described     # Unspecified anxiety vs Generalized Anxiety Disorder  - Monitor for  symptoms.  - Fluoxetine held due to suspicion of ongoing manic symptoms.     Psychiatric Hospital Course:      Estevan Aaron was admitted to [facility] on a 72 hour hold.   Medications:  PTA fluoxetine was held due to concern for worsening of zelalem   New medications started at the time of admission include Zyprexa.   Increased olanzapine 10 mg at bedtime was to 10 mg BID (10/14)   Increased olanzapine 10 mg BID to 10 mg during day and 15 mg at bedtime (10/15)  10/21: increased Zyprexa pm dose from 15 to 20 mg  10/23: started melatonin 3 mg at 7 pm to help patient with circadian rhythm as he has been staying up throughout the night and sleeping a lot during the day and there is some concern for delirium     Care conference 10/18/24 with daughter Maria C and ex-wife Clifford  - Report that patient has always been paranoid since ex-wife first met him. She describes him always feeling like he is getting ripped off.   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 They reported once incidence in which he made a bid on a job and the client paid him, but he never ended up finishing the job  - Wife and him  officially this year, and since selling the house several months ago, patient has been moving from hotel to hotel due to thinking people are out to get him   - When they were selling their house, patient threatened realtors and felt he was being ripped off   - Clifford reports that she started to be afraid to be around him alone  - When he was found in the hotel, he had a generator full of  gasoline, binoculars, bullets, and hunting equipment.    10/21/24: updated daughter on Santos's treatment plan     10/23/24: updated daughter on Santos's treatment plan     The risks, benefits, alternatives, and side effects were discussed and understood by the patient and other caregivers.     Medical Assessment and Plan     Medical diagnoses to be addressed this admission:    # Hypertension  - Continue PTA medications  Furosemide 40 mg daily  Lisinopril 40 mg daily  Metoprolol 50 mg daily  Amlodipine 10 mg daily  Clonidine 0.1 mg BID     # Hyperlipidemia  - Continue PTA Atorvastatin 40 mg     # Hyperparathyroidism  # Hypercalcemia, hypophosphoremia   Increased Ca level to 10.9 and decreased Ph level to 2.3 in the ED. Suspicion patient's hypercalcemia could be contributing to symptoms of psychosis.  - Consulted endocrinology, who started cinacalcet 30 mg BID on 10/13  - 10/16 endocrinology recommended continuing to trend calcium and albumin to make sure patient does not become hypocalcemic and recommended holding cinacalcet   - 10/17, calcium and albumin wnl, endo recommended cinacalcet 30 mg once daily   - 10/21, per endo continue cincaclcet and recheck calcium and albumin on October 24     Medical course: Patient was physically examined by the ED prior to being transferred to the unit and was found to be medically stable and appropriate for admission.      Consults: Psychiatry, Endocrinology (follow-up of hyperthyroidism / hypercalcemia and hypertension)     Checklist     Legal Status: Court hold     Safety Assessment:   Behavioral Orders   Procedures    Assault precautions    Code 1 - Restrict to Unit    Elopement precautions    Routine Programming     As clinically indicated    Status 15     Every 15 minutes.    Status Individual Observation     1:1 during day shift, 2:1 during evening and night shift     Patient SIO status reviewed with team/RN.  Please also refer to RN/team documentation for add'l detail.    -SIO  staff to monitor following which have contributed to patient being on SIO:  Pt has psychosis regarding being followed and attacked, very paranoid, HI    -Possible interventions SIO staff could use to support patient's treatment progress:  Redirect and reassure  -When following observed, team will review discontinuation of SIO:  Psychosis improves     Order Specific Question:   CONTINUOUS 24 hours / day     Answer:   Other     Order Specific Question:   Specify distance     Answer:   10 feet, 5 feet when close to the doors     Order Specific Question:   Indications for SIO     Answer:   Assault risk     Order Specific Question:   Indications for SIO     Answer:   Severe intrusiveness     Risk Assessment:  Risk for harm is elevated.  Risk factors: impulsive and past behaviors  Protective factors: family     Disposition: Pending stabilization, medication optimization, & development of a safe discharge plan.      Attestations     This patient was seen and discussed with my attending physician.  Presley Barrera MD  PGY-1 Psychiatry Resident

## 2024-10-24 LAB
ANA SER QL IF: NEGATIVE
CERULOPLASMIN SERPL-MCNC: 33 MG/DL (ref 20–60)

## 2024-10-24 PROCEDURE — 250N000013 HC RX MED GY IP 250 OP 250 PS 637

## 2024-10-24 PROCEDURE — 99232 SBSQ HOSP IP/OBS MODERATE 35: CPT | Mod: GC | Performed by: PSYCHIATRY & NEUROLOGY

## 2024-10-24 PROCEDURE — 124N000002 HC R&B MH UMMC

## 2024-10-24 RX ADMIN — OLANZAPINE 10 MG: 10 TABLET, FILM COATED ORAL at 08:49

## 2024-10-24 RX ADMIN — LISINOPRIL 40 MG: 10 TABLET ORAL at 09:49

## 2024-10-24 RX ADMIN — AMLODIPINE BESYLATE 10 MG: 5 TABLET ORAL at 08:48

## 2024-10-24 RX ADMIN — ATORVASTATIN CALCIUM 40 MG: 20 TABLET, FILM COATED ORAL at 08:48

## 2024-10-24 RX ADMIN — FUROSEMIDE 40 MG: 40 TABLET ORAL at 08:51

## 2024-10-24 RX ADMIN — NICOTINE POLACRILEX 2 MG: 2 GUM, CHEWING BUCCAL at 12:54

## 2024-10-24 RX ADMIN — OLANZAPINE 5 MG: 5 TABLET, FILM COATED ORAL at 11:01

## 2024-10-24 RX ADMIN — Medication 3 MG: at 19:41

## 2024-10-24 RX ADMIN — Medication 1 PATCH: at 08:59

## 2024-10-24 RX ADMIN — OLANZAPINE 20 MG: 20 TABLET, ORALLY DISINTEGRATING ORAL at 19:41

## 2024-10-24 RX ADMIN — CINACALCET 30 MG: 30 TABLET ORAL at 08:49

## 2024-10-24 RX ADMIN — CLONIDINE HYDROCHLORIDE 0.1 MG: 0.1 TABLET ORAL at 19:41

## 2024-10-24 RX ADMIN — CLONIDINE HYDROCHLORIDE 0.1 MG: 0.1 TABLET ORAL at 08:49

## 2024-10-24 RX ADMIN — METOPROLOL SUCCINATE 50 MG: 50 TABLET, EXTENDED RELEASE ORAL at 08:51

## 2024-10-24 ASSESSMENT — ACTIVITIES OF DAILY LIVING (ADL)
ADLS_ACUITY_SCORE: 13.5
DRESS: INDEPENDENT
ADLS_ACUITY_SCORE: 13.5
HYGIENE/GROOMING: INDEPENDENT
ADLS_ACUITY_SCORE: 13.5
ORAL_HYGIENE: INDEPENDENT
ADLS_ACUITY_SCORE: 13.5
ADLS_ACUITY_SCORE: 13.5
HYGIENE/GROOMING: INDEPENDENT
ADLS_ACUITY_SCORE: 13.5
DRESS: INDEPENDENT
ADLS_ACUITY_SCORE: 13.5
ADLS_ACUITY_SCORE: 13.5
ORAL_HYGIENE: INDEPENDENT

## 2024-10-24 NOTE — PLAN OF CARE
Team Note Due:  Monday    Assessment/Intervention/Current Symtoms and Care Coordination:  Chart review and met with team, discussed pt progress, symptomology, and response to treatment.  Discussed the discharge plan and any potential impediments to discharge.    Pt attended final court hearing at 9:30am. Ortega hearing scheduled 10/31.    Discharge Plan or Goal:  TBD - patient has been living out of hotels for the last 2 months but may be able to stay with one of his daughters upon discharge.     Barriers to Discharge:  Patient requires further psychiatric stabilization due to current symptomology, medication management with changes subject to provider, coordination with outside supports, and aftercare planning. A petition for MI Commitment/Ortega has been filed.     Referral Status:  None at this time     Legal Status:  Court hold    Petition for MI Commitment and Ortega filed 10/14/24  [facility]  File Number: [id number]  Start and expiration date of commitment: TBD    Ortega meds requested: Haldol, Prolixin, Clozaril, Risperdal, Invega, Zyprexa, Seroquel, Abilify    Future Court Hearings:  Final Hearing: Thursday 10/24 at 09:30 AM  Ortega Hearing: Thursday 10/31 at D    PPS:  Shelby Chowdary: [phone]  [email or web address].    Contacts:  Maria C Agnieszka (Daughter): [phone]   Clifford Agnieszka (ex-wife): [phone]      Upcoming Meetings and Dates/Important Information and next steps:  Follow commitment process  Discharge planning when appropriate    Provisional Discharge and Change of Status needed at discharge.

## 2024-10-24 NOTE — PLAN OF CARE
BEH IP Unit Acuity Rating Score (UARS)  Patient is given one point for every criteria they meet.    CRITERIA SCORING   On a 72 hour hold, court hold, committed, stay of commitment, or revocation. 1    Patient LOS on BEH unit exceeds 20 days. 0  LOS: 12   Patient under guardianship, 55+, otherwise medically complex, or under age 11. 1   Suicide ideation without relief of precipitating factors. 0   Current plan for suicide. 0   Current plan for homicide. 0   Imminent risk or actual attempt to seriously harm another without relief of factors precipitating the attempt. 1   Severe dysfunction in daily living (ex: complete neglect for self care, extreme disruption in vegetative function, extreme deterioration in social interactions). 1   Recent (last 7 days) or current physical aggression in the ED or on unit. 1 - last 10/22   Restraints or seclusion episode in past 72 hours. 0   Recent (last 7 days) or current verbal aggression, agitation, yelling, etc., while in the ED or unit. 1 - last 10/22   Active psychosis. 1   Need for constant or near constant redirection (from leaving, from others, etc).  1   Intrusive or disruptive behaviors. 1   Patient requires 3 or more hours of individualized nursing care per 8-hour shift (i.e. for ADLs, meds, therapeutic interventions). 1   TOTAL 10

## 2024-10-24 NOTE — PROGRESS NOTES
"  ----------------------------------------------------------------------------------------------------------  [facility]  Psychiatry Progress Note  Hospital Day #12     Interim History:     The patient's care was discussed with the treatment team and chart notes were reviewed.    Vitals: VSS, intermittently bradycardic to 50s   Sleep: 6.25 hours (10/24/24 0615)  Scheduled medications: Took all scheduled medications as prescribed  Psychiatric PRNs: None    Staff Report:   -Loitering near exit doors  -Redirectable   -Disorganized     Please see staff notes for details      Subjective:     Patient Interview:     Patient interviewed in the hallway. When asked how he is today points to chair and tells this writer \"It's broke. Pam katz. I've had it. I can russ the world. When the  sees the part is off you call the .\" When asked how he slept \"patient replies \"Well, I don't know. Plumbing is everyone's dream. I've never heard a contractor say. Look down at me. He's huge. I said, go get the part. A contractor has. They are not fool-proof.\" Patient then ended interview by walking into his room.     ROS:  Negative except for above.      Objective:     Vitals:  /69 (BP Location: Left arm, Patient Position: Sitting, Cuff Size: Adult Large)   Pulse 61   Temp 97.5  F (36.4  C) (Oral)   Resp 16   Wt 104.1 kg (229 lb 9.6 oz)   SpO2 97%   BMI 37.06 kg/m      Allergies:  No Known Allergies    Current Medications:  Scheduled:  Current Facility-Administered Medications   Medication Dose Route Frequency Provider Last Rate Last Admin    acetaminophen (TYLENOL) tablet 650 mg  650 mg Oral Q4H PRN Nikki Caceres MD   650 mg at 10/17/24 0837    alum & mag hydroxide-simethicone (MAALOX) suspension 30 mL  30 mL Oral Q4H PRN Nikki Caceres MD   30 mL at 10/17/24 0837    amLODIPine (NORVASC) tablet 10 mg  10 mg Oral Daily Presley Barrera MD   10 mg " at 10/23/24 0855    atorvastatin (LIPITOR) tablet 40 mg  40 mg Oral Daily Nikki Caceres MD   40 mg at 10/23/24 0853    cholecalciferol (VITAMIN D3) capsule 1,250 mcg  1,250 mcg Oral Q7 Days Evelia Grimm DO   1,250 mcg at 10/22/24 1124    cinacalcet (SENSIPAR) tablet 30 mg  30 mg Oral Daily Presley Barrera MD   30 mg at 10/23/24 0854    cloNIDine (CATAPRES) tablet 0.1 mg  0.1 mg Oral BID Presley Barrera MD   0.1 mg at 10/23/24 1932    furosemide (LASIX) tablet 40 mg  40 mg Oral Daily Presley Barrera MD   40 mg at 10/23/24 0854    gabapentin (NEURONTIN) capsule 100 mg  100 mg Oral Q6H PRN Nikki Caceres MD        lisinopril (ZESTRIL) tablet 40 mg  40 mg Oral Daily Presley Barrera MD   40 mg at 10/23/24 0854    melatonin tablet 3 mg  3 mg Oral At Bedtime Presley Barrera MD   3 mg at 10/23/24 1932    metoprolol succinate ER (TOPROL XL) 24 hr tablet 50 mg  50 mg Oral Daily Presley Barrera MD   50 mg at 10/23/24 0854    nicotine (NICODERM CQ) 14 MG/24HR 24 hr patch 1 patch  1 patch Transdermal Daily Evelia Grimm DO   1 patch at 10/23/24 0855    nicotine (NICODERM CQ) 21 MG/24HR 24 hr patch 1 patch  1 patch Transdermal Daily PRN Britt Kang MD   1 patch at 10/13/24 1611    nicotine (NICORETTE) gum 2 mg  2 mg Buccal Q1H PRN González Garcia MD        OLANZapine (zyPREXA) tablet 5 mg  5 mg Oral TID PRN Evelia Grimm DO   5 mg at 10/23/24 0449    Or    OLANZapine (zyPREXA) injection 5 mg  5 mg Intramuscular TID PRN Evelia Grimm,         OLANZapine (zyPREXA) tablet 10 mg  10 mg Oral QAM Evelia Grimm DO   10 mg at 10/23/24 0854    OLANZapine zydis (zyPREXA) ODT tab 20 mg  20 mg Oral At Bedtime Presley Barrrea MD   20 mg at 10/23/24 1932    polyethylene glycol (MIRALAX) Packet 17 g  17 g Oral Daily PRN Nikki Caceres MD        traZODone (DESYREL) half-tab 25 mg  25 mg Oral At Bedtime PRN Evelia Grimm DO   25 mg at 10/22/24 2225    Or    traZODone (DESYREL) tablet 50 mg  50 mg Oral At Bedtime PRN Evelia Grimm DO          PRN:  Current Facility-Administered Medications   Medication Dose Route Frequency Provider Last Rate Last Admin    acetaminophen (TYLENOL) tablet 650 mg  650 mg Oral Q4H PRN Nikki Caceres MD   650 mg at 10/17/24 0837    alum & mag hydroxide-simethicone (MAALOX) suspension 30 mL  30 mL Oral Q4H PRN Nikki Caceres MD   30 mL at 10/17/24 0837    amLODIPine (NORVASC) tablet 10 mg  10 mg Oral Daily Presley Barrera MD   10 mg at 10/23/24 0855    atorvastatin (LIPITOR) tablet 40 mg  40 mg Oral Daily Nikki Caceres MD   40 mg at 10/23/24 0853    cholecalciferol (VITAMIN D3) capsule 1,250 mcg  1,250 mcg Oral Q7 Days Evelia Grimm DO   1,250 mcg at 10/22/24 1124    cinacalcet (SENSIPAR) tablet 30 mg  30 mg Oral Daily Presley Barrera MD   30 mg at 10/23/24 0854    cloNIDine (CATAPRES) tablet 0.1 mg  0.1 mg Oral BID Presley Barrera MD   0.1 mg at 10/23/24 1932    furosemide (LASIX) tablet 40 mg  40 mg Oral Daily Presley Barrera MD   40 mg at 10/23/24 0854    gabapentin (NEURONTIN) capsule 100 mg  100 mg Oral Q6H PRN Nikki Caceres MD        lisinopril (ZESTRIL) tablet 40 mg  40 mg Oral Daily Presley Barrera MD   40 mg at 10/23/24 0854    melatonin tablet 3 mg  3 mg Oral At Bedtime Presley Barrera MD   3 mg at 10/23/24 1932    metoprolol succinate ER (TOPROL XL) 24 hr tablet 50 mg  50 mg Oral Daily Presley Barrera MD   50 mg at 10/23/24 0854    nicotine (NICODERM CQ) 14 MG/24HR 24 hr patch 1 patch  1 patch Transdermal Daily Evelia Grimm DO   1 patch at 10/23/24 0855    nicotine (NICODERM CQ) 21 MG/24HR 24 hr patch 1 patch  1 patch Transdermal Daily PRN Britt Kang MD   1 patch at 10/13/24 1611    nicotine (NICORETTE) gum 2 mg  2 mg Buccal Q1H PRN González Garcia MD        OLANZapine (zyPREXA) tablet 5 mg  5 mg Oral TID PRN Evelia Grimm DO   5 mg at 10/23/24 0449    Or    OLANZapine (zyPREXA) injection 5 mg  5 mg Intramuscular TID PRN Evelia Grimm DO        OLANZapine (zyPREXA) tablet 10 mg  10 mg  Oral QAM Evelia Grimm DO   10 mg at 10/23/24 0854    OLANZapine zydis (zyPREXA) ODT tab 20 mg  20 mg Oral At Bedtime Presley Barrera MD   20 mg at 10/23/24 1932    polyethylene glycol (MIRALAX) Packet 17 g  17 g Oral Daily PRN Nikki Caceres MD        traZODone (DESYREL) half-tab 25 mg  25 mg Oral At Bedtime PRN Evelia Grimm DO   25 mg at 10/22/24 2225    Or    traZODone (DESYREL) tablet 50 mg  50 mg Oral At Bedtime PRN Evelia Grimm DO         Labs and Imaging:  Data this admission:  - CBC unremarkable  - CMP unremarkable  - TSH normal  - UDS negative  - Vit D low  - Hgb A1c 5.9 (10/13/24)  - Lipids unremarkable  - Vit B12 normal  - Folate normal  - Urinalysis unremarkable  - EKG normal sinus rhythm, QTc 390  - Head CT showed no acute changes  - HIV non reactive  - Treponema antibody non reactive  - ESR wnl   - Ceruloplasmin pending  - BOOGIE pending  - Lyme negative     Parathyroid hormone:   10/13: 85    Calcium:  10/14: 11.4  10/16: 10.3  10/17: 10.3  10/19: 9.8  10/21: 10.6  10/23: 10.3    Albumin:  10/14: 4.3  10/16: 4.3  10/17: 4.1  10/19: 4.2  10/21: 4.5  10/23: 4.3      Mental Status Exam:     Oriented to:  Not oriented to situation.   General:  Awake and Confused  Appearance:  appears stated age, in hospital scrubs  Behavior/Attitude:  easily distracted, secretive, and suspicious  Eye Contact:  appropriate  Psychomotor: no evidence of tics, dystonia, or tardive dyskinesia, agitated, and restless no catatonia present  Speech:  appropriate volume/tone  Language: Speaks nonsensically   Mood:  unable to assess   Affect:  labile and irritable  Thought Process:  ruminative, looseness of association, thought blocking, disorganized, and confused  Thought Content:  did not assess SI/HI endorses visual hallucinations; delusions of paranoia and delusions of grandiosity  Associations:  loose  Insight:  impaired   Judgment:  impaired   Impulse control: decreased  Attention Span:  decreased  Concentration:  Distractible  Recent and Remote Memory:  not formally assessed  Fund of Knowledge:  Not assessed  Muscle Strength and Tone: normal  Gait and Station: Normal     Psychiatric Assessment     Estevan Aaron is a 64 year old male with previous psychiatric diagnoses of GEORGINA admitted from the ER on 10/12/2024 due to concern for HI and psychosis in the context of medical issues (hyperthyroidism, hypercalcemia), psychosocial stressors including with recent divorce and selling his home. This is the patient's first psychiatric hospitalization. Significant symptoms on admission included delusions of persecution with grandiose beliefs, homicidal ideations, as well as disorganized thinking and behavior. The MSE on admission was pertinent for a thought process which was perseverative, circumstantial, tangential, disorganized and tangential; with rambling and looseness of associations,. Psychological contributions to presentation included lack of insight. Social factors contributing to presentation include isolation, recent divorce, selling his house, and moving from hotel to hotel. Biological contributions to presentation included a history of hyperparathyroidism with chronic hypercalcemia per charts as well as a history of methamphetamine use per collateral from ex-wife.     History was difficult to obtain due to the patient's severe disorganized thinking on interview; he was observed with 1) persecutory delusions pertaining to the realtor and gang members, 2) disorganized behavior as these delusions have led him to flee to different hotels to stay safe/ has called  complaining of being targeted and 3) auditory hallucinations of voices for the past 12 months. Per collateral from ex-wife, Santos has had paranoid ideations since they first met. He has always felt like people are out to get him or trying to rip him off. She says that he has had visual hallucinations (he will point things out that the rest of the family can't see) for  a long time, but the family would just go along with him to avoid making him angry. Things began to worsen around the beginning of the COVID pandemic, when Santos became more isolated and the family started to notice cognitive issues such as impaired memory. Immediately prior to this hospitalization, the ex-wife found Santos in a hotel room with a generator full of gasoline, binoculars, and hunting equipment. This time course does not suggest acute psychosis, however given the patient has never had a full psychiatric work-up, it is reasonable to obtain a first-episode psychosis work-up.     Other things that could be contributing to his presentation is hyperparathyroidism with hypercalcemia. However, patient's calcium returned to normal limits on 10/16, and patient continues to have psychosis.    Patient also continues to be disoriented and his behaviors appear to worsen in the evenings. This raises concern for underlying neurocognitive disorder. We are continuing to rule out other possible etiologies and will attempt to get brain MRI which will require sedation.     In summary, the patient's diagnosis is still in evolution. He will likely benefit from further assessment and management this admission. Given that he currently has psychosis, patient warrants inpatient psychiatric hospitalization to maintain his safety.     Psychiatric Plan by Diagnosis      Today's changes:  - No med changes  - Consulted anesthesia for MRI sedation      # Bipolar I Disorder, current manic episode with psychotic features    1. Medications:  - Discontinued PTA fluoxetine 40 mg, consider restarting at a later time   - Olanzapine 10 mg during day and 20 mg at bedtime     2. Pertinent Labs/Monitoring:   - See above     3. Additional Plans:  - Patient will be treated in therapeutic milieu with appropriate individual and group therapies as described     # Unspecified anxiety vs Generalized Anxiety Disorder  - Monitor for symptoms.  - Fluoxetine  held due to suspicion of ongoing manic symptoms.     Psychiatric Hospital Course:      Estevan Aaron was admitted to [facility] on a 72 hour hold.   Medications:  PTA fluoxetine was held due to concern for worsening of zelalem   New medications started at the time of admission include Zyprexa.   Increased olanzapine 10 mg at bedtime was to 10 mg BID (10/14)   Increased olanzapine 10 mg BID to 10 mg during day and 15 mg at bedtime (10/15)  10/21: increased Zyprexa pm dose from 15 to 20 mg  10/23: started melatonin 3 mg at 7 pm to help patient with circadian rhythm as he has been staying up throughout the night and sleeping a lot during the day and there is some concern for delirium   10/24: consulted anesthesia for MRI brain     Care conference 10/18/24 with daughter Maria C and ex-wife Clifford  - Report that patient has always been paranoid since ex-wife first met him. She describes him always feeling like he is getting ripped off.   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 They reported once incidence in which he made a bid on a job and the client paid him, but he never ended up finishing the job  - Wife and him  officially this year, and since selling the house several months ago, patient has been moving from hotel to hotel due to thinking people are out to get him   - When they were selling their house, patient threatened realtors and felt he was being ripped off   - Clifford reports that she started to be afraid to be around him alone  - When he was found in the hotel, he had a  generator full of gasoline, binoculars, bullets, and hunting equipment.    10/21/24: updated daughter on Santos's treatment plan     10/23/24: updated daughter on Santos's treatment plan     The risks, benefits, alternatives, and side effects were discussed and understood by the patient and other caregivers.     Medical Assessment and Plan     Medical diagnoses to be addressed this admission:    # Hypertension  - Continue PTA medications  Furosemide 40 mg daily  Lisinopril 40 mg daily  Metoprolol 50 mg daily  Amlodipine 10 mg daily  Clonidine 0.1 mg BID     # Hyperlipidemia  - Continue PTA Atorvastatin 40 mg     # Hyperparathyroidism  # Hypercalcemia, hypophosphoremia   Increased Ca level to 10.9 and decreased Ph level to 2.3 in the ED. Suspicion patient's hypercalcemia could be contributing to symptoms of psychosis.  - Consulted endocrinology, who started cinacalcet 30 mg BID on 10/13  - 10/16 endocrinology recommended continuing to trend calcium and albumin to make sure patient does not become hypocalcemic and recommended holding cinacalcet   - 10/17, calcium and albumin wnl, endo recommended cinacalcet 30 mg once daily   - 10/21, per endo continue cincaclcet and recheck calcium and albumin on October 24  - 10/23: per endo, patient needs to follow up outpatient for further management of hyperparathyroidism      Medical course: Patient was physically examined by the ED prior to being transferred to the unit and was found to be medically stable and appropriate for admission.      Consults: Psychiatry, Endocrinology (follow-up of hyperthyroidism / hypercalcemia and hypertension)     Checklist     Legal Status: Court hold     Safety Assessment:   Behavioral Orders   Procedures    Assault precautions    Code 1 - Restrict to Unit    Elopement precautions    Routine Programming     As clinically indicated    Status 15     Every 15 minutes.    Status Individual Observation     1:1 during day shift, 2:1 during evening and night  shift     Patient SIO status reviewed with team/RN.  Please also refer to RN/team documentation for add'l detail.    -SIO staff to monitor following which have contributed to patient being on SIO:  Pt has psychosis regarding being followed and attacked, very paranoid, HI    -Possible interventions SIO staff could use to support patient's treatment progress:  Redirect and reassure  -When following observed, team will review discontinuation of SIO:  Psychosis improves     Order Specific Question:   CONTINUOUS 24 hours / day     Answer:   Other     Order Specific Question:   Specify distance     Answer:   10 feet, 5 feet when close to the doors     Order Specific Question:   Indications for SIO     Answer:   Assault risk     Order Specific Question:   Indications for SIO     Answer:   Severe intrusiveness     Risk Assessment:  Risk for harm is elevated.  Risk factors: impulsive and past behaviors  Protective factors: family     Disposition: Pending stabilization, medication optimization, & development of a safe discharge plan.      Attestations     This patient was seen and discussed with my attending physician.  Presley Barrera MD  PGY-1 Psychiatry Resident    Attestation:  This patient has been seen and evaluated by me, Pete Smith MD.  I have discussed this patient with the house staff team including the resident and/or medical student and I agree with the findings and plan in this note.    I have reviewed today's vital signs, medications, labs and imaging. Pete Smith MD , PhD.

## 2024-10-24 NOTE — PLAN OF CARE
Problem: Sleep Disturbance  Goal: Adequate Sleep/Rest  Outcome: Progressing     Pt  appears to have slept for 6.25 hours. Pt did not complain of pain and no PRN medications were given. Pt denies anxiety, depression, and SI/SIB. Pt is on 2-1 SIO for assault risk and severe intrusiveness for evening and night shift. 15 minutes safety checks were in place. Staff will continue to offer support to pt.

## 2024-10-24 NOTE — PLAN OF CARE
Problem: Adult Behavioral Health Plan of Care  Goal: Optimized Coping Skills in Response to Life Stressors  Outcome: Not Progressing  Intervention: Promote Effective Coping Strategies  Recent Flowsheet Documentation  Taken 10/24/2024 1100 by Rocío Curry RN  Supportive Measures:   active listening utilized   positive reinforcement provided   self-care encouraged   self-reflection promoted   self-responsibility promoted   verbalization of feelings encouraged     Problem: Adult Behavioral Health Plan of Care  Goal: Absence of New-Onset Illness or Injury  Outcome: Progressing  Intervention: Identify and Manage Fall Risk  Recent Flowsheet Documentation  Taken 10/24/2024 1100 by Rocío Curry RN  Safety Measures: safety rounds completed     Problem: Psychotic Signs/Symptoms  Goal: Improved Behavioral Control (Psychotic Signs/Symptoms)  Outcome: Progressing  Intervention: Manage Behavior  Recent Flowsheet Documentation  Taken 10/24/2024 1100 by Rocío Curry RN  De-Escalation Techniques:   appropriate behavior reinforced   diversional activity encouraged   verbally redirected   Goal Outcome Evaluation:    Plan of Care Reviewed With: patient    Pt is visible in the milieu. He comes out for meals and medications. Pt appeared sleepy at the beginning of the shift. He needs explanations when accepting medications. Pt is eating and drinking adequately. No shower done this shift. Mid morning pt got agitated, trying to push doors open and turning chairs upside down. Pt was difficult to redirect and he got more agitated with redirection. It took a while before the pt took a PRN. Pt is observed to have tactile hallucinations, he appeared busy reeling a fishing line. Pt unable to participate with assessment.  He declined blood draw 2 x this shift. Pt continues to be confused, forgetful and disorganized.    Continues to be on SIO for severe intrusiveness.

## 2024-10-25 PROCEDURE — 250N000013 HC RX MED GY IP 250 OP 250 PS 637

## 2024-10-25 PROCEDURE — 99231 SBSQ HOSP IP/OBS SF/LOW 25: CPT | Mod: GC | Performed by: PSYCHIATRY & NEUROLOGY

## 2024-10-25 PROCEDURE — 124N000002 HC R&B MH UMMC

## 2024-10-25 RX ADMIN — LISINOPRIL 40 MG: 10 TABLET ORAL at 08:44

## 2024-10-25 RX ADMIN — CINACALCET 30 MG: 30 TABLET ORAL at 08:44

## 2024-10-25 RX ADMIN — ATORVASTATIN CALCIUM 40 MG: 20 TABLET, FILM COATED ORAL at 08:44

## 2024-10-25 RX ADMIN — FUROSEMIDE 40 MG: 40 TABLET ORAL at 08:44

## 2024-10-25 RX ADMIN — CLONIDINE HYDROCHLORIDE 0.1 MG: 0.1 TABLET ORAL at 19:30

## 2024-10-25 RX ADMIN — CLONIDINE HYDROCHLORIDE 0.1 MG: 0.1 TABLET ORAL at 08:44

## 2024-10-25 RX ADMIN — METOPROLOL SUCCINATE 50 MG: 50 TABLET, EXTENDED RELEASE ORAL at 08:47

## 2024-10-25 RX ADMIN — AMLODIPINE BESYLATE 10 MG: 5 TABLET ORAL at 08:44

## 2024-10-25 RX ADMIN — OLANZAPINE 10 MG: 10 TABLET, FILM COATED ORAL at 08:44

## 2024-10-25 RX ADMIN — OLANZAPINE 20 MG: 20 TABLET, ORALLY DISINTEGRATING ORAL at 19:30

## 2024-10-25 RX ADMIN — Medication 3 MG: at 19:30

## 2024-10-25 RX ADMIN — Medication 1 PATCH: at 08:45

## 2024-10-25 ASSESSMENT — ACTIVITIES OF DAILY LIVING (ADL)
ADLS_ACUITY_SCORE: 13.5
ADLS_ACUITY_SCORE: 13.5
HYGIENE/GROOMING: INDEPENDENT
ADLS_ACUITY_SCORE: 13.5
ADLS_ACUITY_SCORE: 0
DRESS: INDEPENDENT
ADLS_ACUITY_SCORE: 13.5
ADLS_ACUITY_SCORE: 0
ADLS_ACUITY_SCORE: 13.5
ADLS_ACUITY_SCORE: 13.5
HYGIENE/GROOMING: INDEPENDENT
ADLS_ACUITY_SCORE: 13.5
ADLS_ACUITY_SCORE: 0
DRESS: INDEPENDENT
LAUNDRY: WITH SUPERVISION
ADLS_ACUITY_SCORE: 13.5
ADLS_ACUITY_SCORE: 0
ADLS_ACUITY_SCORE: 13.5
ADLS_ACUITY_SCORE: 13.5
ADLS_ACUITY_SCORE: 0
ADLS_ACUITY_SCORE: 13.5
ADLS_ACUITY_SCORE: 13.5
ADLS_ACUITY_SCORE: 0
ADLS_ACUITY_SCORE: 13.5
ORAL_HYGIENE: INDEPENDENT
ORAL_HYGIENE: INDEPENDENT
LAUNDRY: WITH SUPERVISION

## 2024-10-25 NOTE — DISCHARGE INSTRUCTIONS
Behavioral Discharge Planning and Instructions    Summary: You were admitted on 10/12/2024 due to concern for HI and psychosis in the context of medical issues (hyperthyroidism, hypercalcemia) and psychosocial stressors. You were treated by Pete Smith MD and discharged on *** from [facility] to {[facility] D/C Locations:[id number]}    Main Diagnosis: ***  Bipolar I Disorder, current manic episode with psychotic features   Unspecified anxiety vs Generalized Anxiety Disorder     Commitment:   You were dually committed to the [facility] on 10/24/2024 and you are being discharged on a Provisional Discharge Agreement which shall remain in effect for the duration of the Commitment which expires on 04/24/2025. Ortega: You are also court ordered to take the medications that the doctor ordered for you.   : As part of your commitment, you have been assigned a  through [facility]. You are expected to maintain contact with your  throughout the duration of your commitment.      Health Care Follow-up:   Appointment Date/Time: ***   Psychiatrist/Primary Care Giver: ***   Address: ***   Phone Number: ***          Information will be faxed to your outpatient providers to ensure a healthy continuity of care for you.     Attend all scheduled appointments with your outpatient providers. Call at least 24 hours in advance if you need to reschedule an appointment to ensure continued access to your outpatient providers.     Major Treatments, Procedures and Findings:  You were provided with: a psychiatric assessment, assessed for medical stability, medication evaluation and/or management, group therapy, individual therapy, milieu management, and medical interventions    Symptoms to Report: feeling more aggressive, increased confusion, losing more sleep, mood getting worse, or thoughts of suicide    Early warning signs can include: increased  depression or anxiety sleep disturbances increased thoughts or behaviors of suicide or self-harm  increased unusual thinking, such as paranoia or hearing voices    Safety and Wellness:  Take all medicines as directed.  Make no changes unless your doctor suggests them.      Follow treatment recommendations.  Refrain from alcohol and non-prescribed drugs.  If there is a concern for safety, call 911.    Resources:   Mental Health Crisis Resources  Throughout [place]: call **CRISIS (**003491)  Crisis Text Line: is available for free, 24/7 by texting [place] to 832056  Suicide Awareness Voices of Education (SAVE) (www.save.org): 759-305-MTBM (9119)  The [place] Suicide Prevention Lifeline is now: 988 Suicide and Crisis Lifeline. Call 988 anytime.  National [place] on Mental Illness (www.[place].lui.org): 847.790.6537 or 045-876-4799.  [employer]vra[employer]ahks: text the word LIFE to 04911 for immediate support and crisis intervention  Mental Health Consumer/Survivor Network of [place] (www.mhcsn.net): 888.567.9093 or 010-609-0179  Mental Health Association of [place] (www.mentalhealth.org): 977.671.6594 or 015-655-5880  Peer Support Connection [place] Warmline (PSC) 1-658.449.3411 Available from 5pm - 9am (7 days a week/365 days a year)  [facility] 1-855.636.1368      General Medication Instructions:   See your medication sheet(s) for instructions.   Take all medicines as directed.  Make no changes unless your doctor suggests them.   Go to all your doctor visits.  Be sure to have all your required lab tests. This way, your medicines can be refilled on time.  Do not use any drugs not prescribed by your doctor.  Avoid alcohol.    Advance Directives:   Scanned document on file with [facility]? No scanned doc  Is document scanned? Pt states no documents  Honoring Choices Your Rights Handout: Informed and given  Was more information offered? Pt declined    The Treatment team has appreciated the opportunity to work with you. If you have any questions or  concerns about your recent admission, you can contact the unit which can receive your call 24 hours a day, 7 days a week. They will be able to get in touch with a Provider if needed. The unit number is [phone] .

## 2024-10-25 NOTE — PLAN OF CARE
"Team Note Due:  Monday    Assessment/Intervention/Current Symtoms and Care Coordination:  Chart review and met with team, discussed pt progress, symptomology, and response to treatment.  Discussed the discharge plan and any potential impediments to discharge.    Received copy of order for commitment. Copy placed in pt's paper chart, copy given to pt. Pt stated \"I'm not signing anything\" and was informed there is nothing for him to sign with the court paperwork but is simply his copy to keep. Requested pt follow up with any questions after looking through paperwork.     MD updated on legal status.    Discharge Plan or Goal:  TBD - patient has been living out of hotels for the last 2 months but may be able to stay with one of his daughters upon discharge.     Barriers to Discharge:  Patient requires further psychiatric stabilization due to current symptomology, medication management with changes subject to provider, coordination with outside supports, and aftercare planning. A petition for MI Commitment/Ortega has been filed.     Referral Status:  None at this time     Legal Status:  MI Commitment (Ortega pending - hearing 10/31)   [facility]: [place]  File Number: [id number]  Start and expiration date of commitment: 10/24/24 - 04/24/25    Ortega meds requested: Haldol, Prolixin, Clozaril, Risperdal, Invega, Zyprexa, Seroquel, Abilify    Future Court Hearings:  Ortega Hearing: Thursday 10/31 at [facility]    PPS:  Shelby Chowdary: [phone]  [email or web address]    Contacts:  Maria C Agnieszka (Daughter): [phone]   Clifford Agnieszka (ex-wife): [phone]      Upcoming Meetings and Dates/Important Information and next steps:  Follow commitment process  Ortega hearing 10/31  Discharge planning when appropriate  If pt needs Memory Care and needs funding assistance, pt will need to apply for MA with financial counselors before a M[employer]hoices Assessment/SMRT can be requested for waivers.    Provisional Discharge and Change " of Status needed at discharge.

## 2024-10-25 NOTE — PLAN OF CARE
The pt remained on SIO 2:1 for severe intrusiveness throughout the shift. The pt exhibited a calm to tense and suspicious mood. The pt occasionally loitered intrusively around the exit door, demanding to leave. The pt thought was disorganized and hyper verbal, and occasionally confused, thinking he was in a hotel and stating he did not have money to pay for it. Staff continued to redirect the pt and sometimes needed to prompt the pt. The pt was able to engage in playing cards with staff and watching TV for distraction. No behavioral escalations or safety concerns were noted/reported. Appetite was good and fluid intake was adequate. The pt complied with scheduled HS med with minimal prompting and complied with care.    /70 (BP Location: Left arm, Patient Position: Sitting, Cuff Size: Adult Large)   Pulse 61   Temp 97.6  F (36.4  C) (Oral)   Resp 16   Wt 104.1 kg (229 lb 9.6 oz)   SpO2 95%   BMI 37.06 kg/m         Problem: Adult Behavioral Health Plan of Care  Goal: Plan of Care Review  Outcome: Progressing  Flowsheets (Taken 10/24/2024 1630)  Plan of Care Reviewed With: patient  Overall Patient Progress: improving  Patient Agreement with Plan of Care: agrees     Problem: Psychotic Signs/Symptoms  Goal: Improved Mood Symptoms (Psychotic Signs/Symptoms)  Outcome: Progressing  Intervention: Optimize Emotion and Mood  Recent Flowsheet Documentation  Taken 10/24/2024 1630 by Jarrod Keenan RN  Supportive Measures:   active listening utilized                      positive reinforcement provided   self-care encouraged                         self-reflection promoted   self-responsibility promoted              verbalization of feelings encouraged  Diversional Activity: television   Goal Outcome Evaluation:    Plan of Care Reviewed With: patient Plan of Care Reviewed With: patient    Overall Patient Progress: improvingOverall Patient Progress: improving

## 2024-10-25 NOTE — PROGRESS NOTES
"  ----------------------------------------------------------------------------------------------------------  [facility]  Psychiatry Progress Note  Hospital Day #13     Interim History:     The patient's care was discussed with the treatment team and chart notes were reviewed.    Vitals: VSS, intermittently bradycardic to 50s   Sleep: 5 hours (10/25/24 0615)  Scheduled medications: Took all scheduled medications as prescribed  Psychiatric PRNs: Zyprexa 5 mg x 1    Staff Report:   -Agitated--tried to push doors open and flipped chairs upside down  -Difficult to redirect  -Appears to be reeling a fishing line     Please see staff notes for details      Subjective:     Patient Interview:   Estevan Aaron was interviewed in the milieu. Tells this writer he slept \"as good as a bell.\" Reports it is a Friday in August. When asked what type of building we are in he struggles to provide a response. Tells this writer he enjoys golfing, fishing, hunting, \"cleaning all of the leaves\" when he is outside the hospital. Upon asking what we can do for him today he responds \"let me out of her.\"     ROS:  Negative except for above.      Objective:     Vitals:  /70 (BP Location: Left arm, Patient Position: Sitting, Cuff Size: Adult Large)   Pulse 61   Temp 97.6  F (36.4  C) (Oral)   Resp 16   Wt 104.1 kg (229 lb 9.6 oz)   SpO2 95%   BMI 37.06 kg/m      Allergies:  No Known Allergies    Current Medications:  Scheduled:  Current Facility-Administered Medications   Medication Dose Route Frequency Provider Last Rate Last Admin    acetaminophen (TYLENOL) tablet 650 mg  650 mg Oral Q4H PRN Nikki Caceres MD   650 mg at 10/17/24 0837    alum & mag hydroxide-simethicone (MAALOX) suspension 30 mL  30 mL Oral Q4H PRN Nikki Caceres MD   30 mL at 10/17/24 0837    amLODIPine (NORVASC) tablet 10 mg  10 mg Oral Daily Presley Barrera MD   10 mg at 10/24/24 0848    atorvastatin " (LIPITOR) tablet 40 mg  40 mg Oral Daily Nikki Caceres MD   40 mg at 10/24/24 0848    cholecalciferol (VITAMIN D3) capsule 1,250 mcg  1,250 mcg Oral Q7 Days Evelia Grimm DO   1,250 mcg at 10/22/24 1124    cinacalcet (SENSIPAR) tablet 30 mg  30 mg Oral Daily Presley Barrera MD   30 mg at 10/24/24 0849    cloNIDine (CATAPRES) tablet 0.1 mg  0.1 mg Oral BID Presley Barrera MD   0.1 mg at 10/24/24 1941    furosemide (LASIX) tablet 40 mg  40 mg Oral Daily Presley Barrera MD   40 mg at 10/24/24 0851    gabapentin (NEURONTIN) capsule 100 mg  100 mg Oral Q6H PRN Nikki Caceres MD        lisinopril (ZESTRIL) tablet 40 mg  40 mg Oral Daily Presley Barrera MD   40 mg at 10/24/24 0949    melatonin tablet 3 mg  3 mg Oral At Bedtime Presley Barrera MD   3 mg at 10/24/24 1941    metoprolol succinate ER (TOPROL XL) 24 hr tablet 50 mg  50 mg Oral Daily Presley Barrera MD   50 mg at 10/24/24 0851    nicotine (NICODERM CQ) 14 MG/24HR 24 hr patch 1 patch  1 patch Transdermal Daily Evelia Grimm DO   1 patch at 10/24/24 0859    nicotine (NICODERM CQ) 21 MG/24HR 24 hr patch 1 patch  1 patch Transdermal Daily PRN Britt Kang MD   1 patch at 10/13/24 1611    nicotine (NICORETTE) gum 2 mg  2 mg Buccal Q1H PRN González Garcia MD   2 mg at 10/24/24 1254    OLANZapine (zyPREXA) tablet 5 mg  5 mg Oral TID PRN Evelia Grimm DO   5 mg at 10/24/24 1101    Or    OLANZapine (zyPREXA) injection 5 mg  5 mg Intramuscular TID PRN Evelia Grimm DO        OLANZapine (zyPREXA) tablet 10 mg  10 mg Oral QAM Evelia Grimm DO   10 mg at 10/24/24 0849    OLANZapine zydis (zyPREXA) ODT tab 20 mg  20 mg Oral At Bedtime Presley Barrera MD   20 mg at 10/24/24 1941    polyethylene glycol (MIRALAX) Packet 17 g  17 g Oral Daily PRN Nikki Caceres MD        traZODone (DESYREL) half-tab 25 mg  25 mg Oral At Bedtime PRN Evelia Grimm DO   25 mg at 10/22/24 2225    Or    traZODone (DESYREL) tablet 50 mg  50 mg Oral At Bedtime PRN Evelia Grimm DO          PRN:  Current Facility-Administered Medications   Medication Dose Route Frequency Provider Last Rate Last Admin    acetaminophen (TYLENOL) tablet 650 mg  650 mg Oral Q4H PRN Nikki Caceres MD   650 mg at 10/17/24 0837    alum & mag hydroxide-simethicone (MAALOX) suspension 30 mL  30 mL Oral Q4H PRN Nikki Caceres MD   30 mL at 10/17/24 0837    amLODIPine (NORVASC) tablet 10 mg  10 mg Oral Daily Presley Barrera MD   10 mg at 10/24/24 0848    atorvastatin (LIPITOR) tablet 40 mg  40 mg Oral Daily Nikki Caceres MD   40 mg at 10/24/24 0848    cholecalciferol (VITAMIN D3) capsule 1,250 mcg  1,250 mcg Oral Q7 Days Evelia Grimm DO   1,250 mcg at 10/22/24 1124    cinacalcet (SENSIPAR) tablet 30 mg  30 mg Oral Daily Presley Barrera MD   30 mg at 10/24/24 0849    cloNIDine (CATAPRES) tablet 0.1 mg  0.1 mg Oral BID Presley Barrera MD   0.1 mg at 10/24/24 1941    furosemide (LASIX) tablet 40 mg  40 mg Oral Daily Presley Barrera MD   40 mg at 10/24/24 0851    gabapentin (NEURONTIN) capsule 100 mg  100 mg Oral Q6H PRN Nikki Caceres MD        lisinopril (ZESTRIL) tablet 40 mg  40 mg Oral Daily Presley Barrera MD   40 mg at 10/24/24 0949    melatonin tablet 3 mg  3 mg Oral At Bedtime Presley Barrera MD   3 mg at 10/24/24 1941    metoprolol succinate ER (TOPROL XL) 24 hr tablet 50 mg  50 mg Oral Daily Presley Barrera MD   50 mg at 10/24/24 0851    nicotine (NICODERM CQ) 14 MG/24HR 24 hr patch 1 patch  1 patch Transdermal Daily Evelia Grimm DO   1 patch at 10/24/24 0859    nicotine (NICODERM CQ) 21 MG/24HR 24 hr patch 1 patch  1 patch Transdermal Daily PRN Britt Kang MD   1 patch at 10/13/24 1611    nicotine (NICORETTE) gum 2 mg  2 mg Buccal Q1H PRN González Garcia MD   2 mg at 10/24/24 1254    OLANZapine (zyPREXA) tablet 5 mg  5 mg Oral TID PRN Evelia Grimm DO   5 mg at 10/24/24 1101    Or    OLANZapine (zyPREXA) injection 5 mg  5 mg Intramuscular TID PRN Evelia Grimm DO        OLANZapine (zyPREXA)  "tablet 10 mg  10 mg Oral QAM Evelia Grimm DO   10 mg at 10/24/24 0849    OLANZapine zydis (zyPREXA) ODT tab 20 mg  20 mg Oral At Bedtime Presley Barrera MD   20 mg at 10/24/24 1941    polyethylene glycol (MIRALAX) Packet 17 g  17 g Oral Daily PRN Nikki Caceres MD        traZODone (DESYREL) half-tab 25 mg  25 mg Oral At Bedtime PRN Evelia Grimm DO   25 mg at 10/22/24 2225    Or    traZODone (DESYREL) tablet 50 mg  50 mg Oral At Bedtime PRN Evelia Grimm,          Labs and Imaging:  Data this admission:  - CBC unremarkable  - CMP unremarkable  - TSH normal  - UDS negative  - Vit D low  - Hgb A1c 5.9 (10/13/24)  - Lipids unremarkable  - Vit B12 normal  - Folate normal  - Urinalysis unremarkable  - EKG normal sinus rhythm, QTc 390  - Head CT showed no acute changes  - HIV non reactive  - Treponema antibody non reactive  - ESR wnl   - Ceruloplasmin pending  - BOOGIE pending  - Lyme negative     Parathyroid hormone:   10/13: 85    Calcium:  10/14: 11.4  10/16: 10.3  10/17: 10.3  10/19: 9.8  10/21: 10.6  10/23: 10.3    Albumin:  10/14: 4.3  10/16: 4.3  10/17: 4.1  10/19: 4.2  10/21: 4.5  10/23: 4.3      Mental Status Exam:     Oriented to:  Oriented to day of the week. Said it was August. Not oriented to situation.   General:  Awake and Confused  Appearance:  appears stated age, in hospital scrubs  Behavior/Attitude:  easily distracted and suspicious  Eye Contact:  appropriate  Psychomotor: no evidence of tics, dystonia, or tardive dyskinesia, agitated, and restless no catatonia present  Speech:  appropriate volume/tone  Language: Speaks nonsensically   Mood:  \"good\"   Affect:  blunted and irritable  Thought Process:  ruminative, looseness of association, thought blocking, disorganized, and confused  Thought Content:  did not assess SI/HI/AH/VH; delusions of paranoia and delusions of grandiosity  Associations:  loose  Insight:  impaired   Judgment:  impaired   Impulse control: decreased  Attention Span:  " decreased  Concentration: Distractible  Recent and Remote Memory:  not formally assessed  Fund of Knowledge:  Not assessed  Muscle Strength and Tone: normal  Gait and Station: Normal     Psychiatric Assessment     Estevan Aaron is a 64 year old male with previous psychiatric diagnoses of GEORGINA admitted from the ER on 10/12/2024 due to concern for HI and psychosis in the context of medical issues (hyperthyroidism, hypercalcemia), psychosocial stressors including with recent divorce and selling his home. This is the patient's first psychiatric hospitalization. Significant symptoms on admission included delusions of persecution with grandiose beliefs, homicidal ideations, as well as disorganized thinking and behavior. The MSE on admission was pertinent for a thought process which was perseverative, circumstantial, tangential, disorganized and tangential; with rambling and looseness of associations,. Psychological contributions to presentation included lack of insight. Social factors contributing to presentation include isolation, recent divorce, selling his house, and moving from hotel to hotel. Biological contributions to presentation included a history of hyperparathyroidism with chronic hypercalcemia per charts as well as a history of methamphetamine use per collateral from ex-wife.     History was difficult to obtain due to the patient's severe disorganized thinking on interview; he was observed with 1) persecutory delusions pertaining to the realtor and gang members, 2) disorganized behavior as these delusions have led him to flee to different hotels to stay safe/ has called  complaining of being targeted and 3) auditory hallucinations of voices for the past 12 months. Per collateral from ex-wife, Santos has had paranoid ideations since they first met. He has always felt like people are out to get him or trying to rip him off. She says that he has had visual hallucinations (he will point things out that the rest of  the family can't see) for a long time, but the family would just go along with him to avoid making him angry. Things began to worsen around the beginning of the COVID pandemic, when Santos became more isolated and the family started to notice cognitive issues such as impaired memory. Immediately prior to this hospitalization, the ex-wife found Santos in a hotel room with a generator full of gasoline, binoculars, and hunting equipment. This time course does not suggest acute psychosis, however given the patient has never had a full psychiatric work-up, it is reasonable to obtain a first-episode psychosis work-up.     Other things that could be contributing to his presentation is hyperparathyroidism with hypercalcemia. However, patient's calcium returned to normal limits on 10/16, and patient continues to have psychosis.    Patient also continues to be disoriented and his behaviors appear to worsen in the evenings. This raises concern for underlying neurocognitive disorder. We are continuing to rule out other possible etiologies and will attempt to get brain MRI which will require sedation.     In summary, the patient's diagnosis is still in evolution. He will likely benefit from further assessment and management this admission. Given that he currently has psychosis, patient warrants inpatient psychiatric hospitalization to maintain his safety.     Psychiatric Plan by Diagnosis      Today's changes:  - None     # Bipolar I Disorder, current manic episode with psychotic features    1. Medications:  - Discontinued PTA fluoxetine 40 mg, consider restarting at a later time   - Olanzapine 10 mg during day and 20 mg at bedtime     2. Pertinent Labs/Monitoring:   - See above     3. Additional Plans:  - Patient will be treated in therapeutic milieu with appropriate individual and group therapies as described     # Unspecified anxiety vs Generalized Anxiety Disorder  - Monitor for symptoms.  - Fluoxetine held due to suspicion of  ongoing manic symptoms.     Psychiatric Hospital Course:      Estevan Aaron was admitted to [facility] on a 72 hour hold.   Medications:  PTA fluoxetine was held due to concern for worsening of zelalem   New medications started at the time of admission include Zyprexa.   Increased olanzapine 10 mg at bedtime was to 10 mg BID (10/14)   Increased olanzapine 10 mg BID to 10 mg during day and 15 mg at bedtime (10/15)  10/21: increased Zyprexa pm dose from 15 to 20 mg  10/23: started melatonin 3 mg at 7 pm to help patient with circadian rhythm as he has been staying up throughout the night and sleeping a lot during the day and there is some concern for delirium   10/24: consulted anesthesia for MRI brain     Care conference 10/18/24 with daughter Maria C and ex-wife Clifford  - Report that patient has always been paranoid since ex-wife first met him. She describes him always feeling like he is getting ripped off.   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 They reported once incidence in which he made a bid on a job and the client paid him, but he never ended up finishing the job  - Wife and him  officially this year, and since selling the house several months ago, patient has been moving from hotel to hotel due to thinking people are out to get him   - When they were selling their house, patient threatened realtors and felt he was being ripped off   - Clifford reports that she started to be afraid to be around him alone  - When he was found in the hotel, he had a generator full of gasoline,  binoculars, bullets, and hunting equipment.    10/21/24: updated daughter on Santos's treatment plan     10/23/24: updated daughter on Santos's treatment plan     10/25/24: updated daughter on Santos's treatment plan, including plan to try and get MRI brain done under sedation. She said that Santos's grandfather had dementia and his sister has a brain tumor.     The risks, benefits, alternatives, and side effects were discussed and understood by the patient and other caregivers.     Medical Assessment and Plan     Medical diagnoses to be addressed this admission:    # Hypertension  - Continue PTA medications  Furosemide 40 mg daily  Lisinopril 40 mg daily  Metoprolol 50 mg daily  Amlodipine 10 mg daily  Clonidine 0.1 mg BID     # Hyperlipidemia  - Continue PTA Atorvastatin 40 mg     # Hyperparathyroidism  # Hypercalcemia, hypophosphoremia   Increased Ca level to 10.9 and decreased Ph level to 2.3 in the ED. Suspicion patient's hypercalcemia could be contributing to symptoms of psychosis.  - Consulted endocrinology, who started cinacalcet 30 mg BID on 10/13  - 10/16 endocrinology recommended continuing to trend calcium and albumin to make sure patient does not become hypocalcemic and recommended holding cinacalcet   - 10/17, calcium and albumin wnl, endo recommended cinacalcet 30 mg once daily   - 10/21, per endo continue cincaclcet and recheck calcium and albumin on October 24  - 10/23: per endo, patient needs to follow up outpatient for further management of hyperparathyroidism      Medical course: Patient was physically examined by the ED prior to being transferred to the unit and was found to be medically stable and appropriate for admission.      Consults: Psychiatry, Endocrinology (follow-up of hyperthyroidism / hypercalcemia and hypertension)     Checklist     Legal Status: Court hold     Safety Assessment:   Behavioral Orders   Procedures    Assault precautions    Code 1 - Restrict to Unit    Elopement precautions     Routine Programming     As clinically indicated    Status 15     Every 15 minutes.    Status Individual Observation     1:1 during day shift, 2:1 during evening and night shift     Patient SIO status reviewed with team/RN.  Please also refer to RN/team documentation for add'l detail.    -SIO staff to monitor following which have contributed to patient being on SIO:  Pt has psychosis regarding being followed and attacked, very paranoid, HI    -Possible interventions SIO staff could use to support patient's treatment progress:  Redirect and reassure  -When following observed, team will review discontinuation of SIO:  Psychosis improves     Order Specific Question:   CONTINUOUS 24 hours / day     Answer:   Other     Order Specific Question:   Specify distance     Answer:   10 feet, 5 feet when close to the doors     Order Specific Question:   Indications for SIO     Answer:   Assault risk     Order Specific Question:   Indications for SIO     Answer:   Severe intrusiveness     Risk Assessment:  Risk for harm is elevated.  Risk factors: impulsive and past behaviors  Protective factors: family     Disposition: Pending stabilization, medication optimization, & development of a safe discharge plan.      Attestations     This patient was seen and discussed with my attending physician.  Presley Barrera MD  PGY-1 Psychiatry Resident      Attestation:  This patient has been seen and evaluated by me, Pete Smith MD.  I have discussed this patient with the house staff team including the resident and/or medical student and I agree with the findings and plan in this note.    I have reviewed today's vital signs, medications, labs and imaging. Pete Smith MD , PhD.

## 2024-10-25 NOTE — PLAN OF CARE
Pt has been visible in the milieu, he does not attend groups and does not tend to interact with peers. Pt has appeared sedated throughout the shift and has spent a majority of the day sleeping in the lounge. He denies all mental health symptoms upon assessment, continues to have poor insight to situation. Pt continues to present as confused and is not aware where he is or his current situation. He is med compliant with encouragement, no PRNs received. He has not displayed any attempts to elope from the unit today and has not been agitated.     Problem: Psychotic Signs/Symptoms  Goal: Improved Behavioral Control (Psychotic Signs/Symptoms)  Outcome: Progressing   Goal Outcome Evaluation:    Plan of Care Reviewed With: patient

## 2024-10-25 NOTE — PLAN OF CARE
BEH IP Unit Acuity Rating Score (UARS)  Patient is given one point for every criteria they meet.    CRITERIA SCORING   On a 72 hour hold, court hold, committed, stay of commitment, or revocation. 1    Patient LOS on BEH unit exceeds 20 days. 0  LOS: 13   Patient under guardianship, 55+, otherwise medically complex, or under age 11. 1   Suicide ideation without relief of precipitating factors. 0   Current plan for suicide. 0   Current plan for homicide. 0   Imminent risk or actual attempt to seriously harm another without relief of factors precipitating the attempt. 1   Severe dysfunction in daily living (ex: complete neglect for self care, extreme disruption in vegetative function, extreme deterioration in social interactions). 1   Recent (last 7 days) or current physical aggression in the ED or on unit. 1 - last 10/22   Restraints or seclusion episode in past 72 hours. 0   Recent (last 7 days) or current verbal aggression, agitation, yelling, etc., while in the ED or unit. 1 - last 10/24   Active psychosis. 1   Need for constant or near constant redirection (from leaving, from others, etc).  1   Intrusive or disruptive behaviors. 1   Patient requires 3 or more hours of individualized nursing care per 8-hour shift (i.e. for ADLs, meds, therapeutic interventions). 1   TOTAL 10

## 2024-10-25 NOTE — PLAN OF CARE
Problem: Sleep Disturbance  Goal: Adequate Sleep/Rest  Outcome: Progressing   Goal Outcome Evaluation:  Patient was sleeping in the lounge area at the beginning if the shift but was encouraged to get in bed at around midnight but was out again at approximately 0400. No acute concerns during the shift. No complaints of pain, no administered prn's. Thought process is disorganized. Patient on SIO 2:1 as per ordered during the night. Appears to have slept for 5 hours.      .

## 2024-10-26 PROCEDURE — 250N000013 HC RX MED GY IP 250 OP 250 PS 637

## 2024-10-26 PROCEDURE — 124N000002 HC R&B MH UMMC

## 2024-10-26 RX ADMIN — ATORVASTATIN CALCIUM 40 MG: 20 TABLET, FILM COATED ORAL at 09:05

## 2024-10-26 RX ADMIN — OLANZAPINE 20 MG: 20 TABLET, ORALLY DISINTEGRATING ORAL at 20:43

## 2024-10-26 RX ADMIN — AMLODIPINE BESYLATE 10 MG: 5 TABLET ORAL at 09:05

## 2024-10-26 RX ADMIN — CLONIDINE HYDROCHLORIDE 0.1 MG: 0.1 TABLET ORAL at 20:30

## 2024-10-26 RX ADMIN — METOPROLOL SUCCINATE 50 MG: 50 TABLET, EXTENDED RELEASE ORAL at 09:06

## 2024-10-26 RX ADMIN — Medication 1 PATCH: at 09:06

## 2024-10-26 RX ADMIN — CINACALCET 30 MG: 30 TABLET ORAL at 09:05

## 2024-10-26 RX ADMIN — LISINOPRIL 40 MG: 10 TABLET ORAL at 09:06

## 2024-10-26 RX ADMIN — FUROSEMIDE 40 MG: 40 TABLET ORAL at 09:14

## 2024-10-26 RX ADMIN — CLONIDINE HYDROCHLORIDE 0.1 MG: 0.1 TABLET ORAL at 09:05

## 2024-10-26 RX ADMIN — OLANZAPINE 10 MG: 10 TABLET, FILM COATED ORAL at 09:06

## 2024-10-26 RX ADMIN — Medication 3 MG: at 20:42

## 2024-10-26 ASSESSMENT — ACTIVITIES OF DAILY LIVING (ADL)
ADLS_ACUITY_SCORE: 0
HYGIENE/GROOMING: INDEPENDENT
ADLS_ACUITY_SCORE: 0
ORAL_HYGIENE: INDEPENDENT
ADLS_ACUITY_SCORE: 0
DRESS: INDEPENDENT
ADLS_ACUITY_SCORE: 0
LAUNDRY: WITH SUPERVISION
ADLS_ACUITY_SCORE: 0
ORAL_HYGIENE: INDEPENDENT
ADLS_ACUITY_SCORE: 0
HYGIENE/GROOMING: INDEPENDENT
ADLS_ACUITY_SCORE: 0
ADLS_ACUITY_SCORE: 0
DRESS: INDEPENDENT

## 2024-10-26 NOTE — PLAN OF CARE
Problem: Sleep Disturbance  Goal: Adequate Sleep/Rest  Outcome: Progressing   Goal Outcome Evaluation:  Patient had no new concerns during the night. Asleep at the beginning of the shift but awake during the early morning hours. No complaints or noted signs of pain. No requested or administered prn's. Remains on SIO 2:1 as per orders for assault risk and severe intrusiveness, no behavioral issues this shift. Patient appears to have slept for 5 hours.

## 2024-10-26 NOTE — PLAN OF CARE
Pt remains on SIO 1:1 for intrusive behavior and elopement risk. He was initially pleasant this morning and was cooperative with care, then became agitated around lunchtime demanding to leave and demanding to have his phone. Pt did not escalate beyond becoming verbally agitated and was generally redirectable. Pt denies all mental health symptoms upon assessment. He was compliant with morning medications without issue today, no PRNs received. Hygiene appears unkempt, intake is adequate.    Problem: Psychotic Signs/Symptoms  Goal: Improved Mood Symptoms (Psychotic Signs/Symptoms)  Outcome: Not Progressing   Goal Outcome Evaluation:    Plan of Care Reviewed With: patient

## 2024-10-26 NOTE — PLAN OF CARE
Problem: Adult Behavioral Health Plan of Care  Goal: Plan of Care Review  Outcome: Progressing  Flowsheets (Taken 10/25/2024 4552)  Patient Agreement with Plan of Care: agrees   Goal Outcome Evaluation:    Plan of Care Reviewed With: patient          Pt was out in the milieu most of the shift. He continued on SIO 2:1 10 Ft for assault and severe intrusiveness. He is on assaults and elopement precautions. His affect was flat and blunted. His appearance was unkempt. Encouraged to take a shower but refused. He is eating and drinking okay. He was out for dinner and snacks and ate 100%. He was observed loitering around the exit doors with a paper bag containing his belongings requesting to leave. He had to be redirected several times. He attempted to elope when anyone is leaving or entering the unit. He was cooperative with assessment. He denied pain and all mental health psych symptoms even though he was noted with visual hallucination. Pt said there was water in his room and he was going to do some fishing. He was also observed doing actions as though he was getting something from the wall in his room. No prn given to him this shift. He contracted for safety and was medication compliant. Will continue to monitor and will assist if need arise. Pt is scheduled for labs tomorrow morning. Pt has CBC with Platelets and differential and Vit B1. His Vitals were WNL.    Blood pressure 125/64, pulse 53, temperature 97.6  F (36.4  C), temperature source Oral, resp. rate 16, SpO2 98%.

## 2024-10-26 NOTE — PLAN OF CARE
Problem: Psychotic Signs/Symptoms  Goal: Improved Behavioral Control (Psychotic Signs/Symptoms)  Outcome: Progressing  Intervention: Manage Behavior  Recent Flowsheet Documentation  Taken 10/26/2024 1700 by Pool Urbina RN  De-Escalation Techniques:   verbally redirected   diversional activity encouraged     Problem: Anxiety  Goal: Anxiety Reduction or Resolution  Outcome: Progressing  Intervention: Promote Anxiety Reduction  Recent Flowsheet Documentation  Taken 10/26/2024 1700 by Pool Urbina RN  Supportive Measures:   verbalization of feelings encouraged   positive reinforcement provided   active listening utilized  Family/Support System Care:   involvement promoted   presence promoted   self-care encouraged   support provided   Goal Outcome Evaluation:    Plan of Care Reviewed With: patient      Patient has been actively trying to elope. He has been standing by the main entrance, banging on the door, yelling and trying to follow staff out of the unit. Multiple attempts to redirect was somewhat challenging, as he loitered around the exit door telling every body he wants out.Thought process continue to be disorganized and disoriented; patient continue to be intrusive even with a 2:1 SIO by his side. He declined vitals and refused dinner.

## 2024-10-27 ENCOUNTER — ANESTHESIA EVENT (OUTPATIENT)
Dept: SURGERY | Facility: CLINIC | Age: 64
End: 2024-10-27
Payer: COMMERCIAL

## 2024-10-27 LAB
BASOPHILS # BLD AUTO: 0.1 10E3/UL (ref 0–0.2)
BASOPHILS NFR BLD AUTO: 1 %
EOSINOPHIL # BLD AUTO: 0.2 10E3/UL (ref 0–0.7)
EOSINOPHIL NFR BLD AUTO: 3 %
ERYTHROCYTE [DISTWIDTH] IN BLOOD BY AUTOMATED COUNT: 18.1 % (ref 10–15)
HCT VFR BLD AUTO: 46.5 % (ref 40–53)
HGB BLD-MCNC: 14.2 G/DL (ref 13.3–17.7)
HOLD SPECIMEN: NORMAL
IMM GRANULOCYTES # BLD: 0 10E3/UL
IMM GRANULOCYTES NFR BLD: 0 %
LYMPHOCYTES # BLD AUTO: 2.1 10E3/UL (ref 0.8–5.3)
LYMPHOCYTES NFR BLD AUTO: 28 %
MCH RBC QN AUTO: 19.8 PG (ref 26.5–33)
MCHC RBC AUTO-ENTMCNC: 30.5 G/DL (ref 31.5–36.5)
MCV RBC AUTO: 65 FL (ref 78–100)
MONOCYTES # BLD AUTO: 0.7 10E3/UL (ref 0–1.3)
MONOCYTES NFR BLD AUTO: 9 %
NEUTROPHILS # BLD AUTO: 4.4 10E3/UL (ref 1.6–8.3)
NEUTROPHILS NFR BLD AUTO: 59 %
NRBC # BLD AUTO: 0 10E3/UL
NRBC BLD AUTO-RTO: 0 /100
PLATELET # BLD AUTO: 242 10E3/UL (ref 150–450)
RBC # BLD AUTO: 7.17 10E6/UL (ref 4.4–5.9)
WBC # BLD AUTO: 7.5 10E3/UL (ref 4–11)

## 2024-10-27 PROCEDURE — 36415 COLL VENOUS BLD VENIPUNCTURE: CPT | Performed by: PSYCHIATRY & NEUROLOGY

## 2024-10-27 PROCEDURE — 84425 ASSAY OF VITAMIN B-1: CPT | Performed by: PSYCHIATRY & NEUROLOGY

## 2024-10-27 PROCEDURE — 83550 IRON BINDING TEST: CPT

## 2024-10-27 PROCEDURE — 250N000013 HC RX MED GY IP 250 OP 250 PS 637

## 2024-10-27 PROCEDURE — 85004 AUTOMATED DIFF WBC COUNT: CPT | Performed by: PSYCHIATRY & NEUROLOGY

## 2024-10-27 PROCEDURE — 124N000002 HC R&B MH UMMC

## 2024-10-27 PROCEDURE — 85041 AUTOMATED RBC COUNT: CPT | Performed by: PSYCHIATRY & NEUROLOGY

## 2024-10-27 PROCEDURE — 82728 ASSAY OF FERRITIN: CPT

## 2024-10-27 RX ADMIN — FUROSEMIDE 40 MG: 40 TABLET ORAL at 08:00

## 2024-10-27 RX ADMIN — METOPROLOL SUCCINATE 50 MG: 50 TABLET, EXTENDED RELEASE ORAL at 08:01

## 2024-10-27 RX ADMIN — Medication 3 MG: at 20:56

## 2024-10-27 RX ADMIN — ATORVASTATIN CALCIUM 40 MG: 20 TABLET, FILM COATED ORAL at 08:00

## 2024-10-27 RX ADMIN — CLONIDINE HYDROCHLORIDE 0.1 MG: 0.1 TABLET ORAL at 20:56

## 2024-10-27 RX ADMIN — OLANZAPINE 20 MG: 20 TABLET, ORALLY DISINTEGRATING ORAL at 20:56

## 2024-10-27 RX ADMIN — Medication 1 PATCH: at 08:01

## 2024-10-27 RX ADMIN — CLONIDINE HYDROCHLORIDE 0.1 MG: 0.1 TABLET ORAL at 08:01

## 2024-10-27 RX ADMIN — OLANZAPINE 10 MG: 10 TABLET, FILM COATED ORAL at 08:01

## 2024-10-27 RX ADMIN — AMLODIPINE BESYLATE 10 MG: 5 TABLET ORAL at 08:01

## 2024-10-27 RX ADMIN — LISINOPRIL 40 MG: 10 TABLET ORAL at 08:01

## 2024-10-27 RX ADMIN — CINACALCET 30 MG: 30 TABLET ORAL at 08:01

## 2024-10-27 ASSESSMENT — ACTIVITIES OF DAILY LIVING (ADL)
LAUNDRY: WITH SUPERVISION
LAUNDRY: WITH SUPERVISION
ADLS_ACUITY_SCORE: 0
ORAL_HYGIENE: INDEPENDENT
DRESS: INDEPENDENT
ADLS_ACUITY_SCORE: 0
DRESS: INDEPENDENT
ADLS_ACUITY_SCORE: 0
HYGIENE/GROOMING: INDEPENDENT;PROMPTS
ADLS_ACUITY_SCORE: 0
HYGIENE/GROOMING: INDEPENDENT
ADLS_ACUITY_SCORE: 0
ORAL_HYGIENE: INDEPENDENT
ADLS_ACUITY_SCORE: 0

## 2024-10-27 NOTE — PLAN OF CARE
Pt continues on SIO 1:1 for intrusive behavior and elopement risk. He has presented with a labile mood today, pt is intermittently calm and pleasant but overall irritable. Pt fixated on using his phone and accusing staff of using his phone; pt only semi-redirectable when he becomes agitated. He has not had any elopement attempts today but does watch the exit door often. Pt denies mental health symptoms but appears responding to visual hallucinations at times, believes he is fishing while sitting in the lounge. He was med compliant with minimal issue, no PRNs received. Hygiene is poor, pt encouraged to shower but declined.     Problem: Psychotic Signs/Symptoms  Goal: Improved Behavioral Control (Psychotic Signs/Symptoms)  Outcome: Not Progressing   Goal Outcome Evaluation:    Plan of Care Reviewed With: patient

## 2024-10-28 ENCOUNTER — ANESTHESIA (OUTPATIENT)
Dept: SURGERY | Facility: CLINIC | Age: 64
End: 2024-10-28
Payer: COMMERCIAL

## 2024-10-28 ENCOUNTER — APPOINTMENT (OUTPATIENT)
Dept: MRI IMAGING | Facility: CLINIC | Age: 64
End: 2024-10-28
Payer: COMMERCIAL

## 2024-10-28 LAB
FERRITIN SERPL-MCNC: 396 NG/ML (ref 31–409)
IRON BINDING CAPACITY (ROCHE): 291 UG/DL (ref 240–430)
IRON SATN MFR SERPL: 37 % (ref 15–46)
IRON SERPL-MCNC: 108 UG/DL (ref 61–157)

## 2024-10-28 PROCEDURE — 370N000017 HC ANESTHESIA TECHNICAL FEE, PER MIN

## 2024-10-28 PROCEDURE — 70551 MRI BRAIN STEM W/O DYE: CPT | Performed by: NURSE ANESTHETIST, CERTIFIED REGISTERED

## 2024-10-28 PROCEDURE — 99232 SBSQ HOSP IP/OBS MODERATE 35: CPT | Mod: GC | Performed by: PSYCHIATRY & NEUROLOGY

## 2024-10-28 PROCEDURE — 70553 MRI BRAIN STEM W/O & W/DYE: CPT

## 2024-10-28 PROCEDURE — 124N000002 HC R&B MH UMMC

## 2024-10-28 PROCEDURE — 999N000202 HC STATISTICAL VASC ACCESS NURSE TIME, 1-15 MINUTES

## 2024-10-28 PROCEDURE — 250N000011 HC RX IP 250 OP 636: Performed by: NURSE ANESTHETIST, CERTIFIED REGISTERED

## 2024-10-28 PROCEDURE — 250N000009 HC RX 250: Performed by: NURSE ANESTHETIST, CERTIFIED REGISTERED

## 2024-10-28 PROCEDURE — 258N000003 HC RX IP 258 OP 636: Performed by: NURSE ANESTHETIST, CERTIFIED REGISTERED

## 2024-10-28 PROCEDURE — A9585 GADOBUTROL INJECTION: HCPCS

## 2024-10-28 PROCEDURE — 255N000002 HC RX 255 OP 636

## 2024-10-28 PROCEDURE — 250N000013 HC RX MED GY IP 250 OP 250 PS 637

## 2024-10-28 PROCEDURE — 70551 MRI BRAIN STEM W/O DYE: CPT | Performed by: ANESTHESIOLOGY

## 2024-10-28 PROCEDURE — 250N000009 HC RX 250

## 2024-10-28 PROCEDURE — 999N000141 HC STATISTIC PRE-PROCEDURE NURSING ASSESSMENT

## 2024-10-28 PROCEDURE — 250N000013 HC RX MED GY IP 250 OP 250 PS 637: Performed by: ANESTHESIOLOGY

## 2024-10-28 PROCEDURE — 250N000011 HC RX IP 250 OP 636

## 2024-10-28 PROCEDURE — 70553 MRI BRAIN STEM W/O & W/DYE: CPT | Mod: 26 | Performed by: RADIOLOGY

## 2024-10-28 PROCEDURE — 710N000010 HC RECOVERY PHASE 1, LEVEL 2, PER MIN

## 2024-10-28 RX ORDER — PROPOFOL 10 MG/ML
INJECTION, EMULSION INTRAVENOUS CONTINUOUS PRN
Status: DISCONTINUED | OUTPATIENT
Start: 2024-10-28 | End: 2024-10-28

## 2024-10-28 RX ORDER — EPHEDRINE SULFATE 50 MG/ML
INJECTION, SOLUTION INTRAMUSCULAR; INTRAVENOUS; SUBCUTANEOUS PRN
Status: DISCONTINUED | OUTPATIENT
Start: 2024-10-28 | End: 2024-10-28

## 2024-10-28 RX ORDER — GADOBUTROL 604.72 MG/ML
10 INJECTION INTRAVENOUS ONCE
Status: COMPLETED | OUTPATIENT
Start: 2024-10-28 | End: 2024-10-28

## 2024-10-28 RX ORDER — OLANZAPINE 5 MG/1
5 TABLET ORAL EVERY MORNING
Status: DISCONTINUED | OUTPATIENT
Start: 2024-10-29 | End: 2024-10-30

## 2024-10-28 RX ORDER — PROPOFOL 10 MG/ML
INJECTION, EMULSION INTRAVENOUS PRN
Status: DISCONTINUED | OUTPATIENT
Start: 2024-10-28 | End: 2024-10-28

## 2024-10-28 RX ORDER — DEXAMETHASONE SODIUM PHOSPHATE 4 MG/ML
INJECTION, SOLUTION INTRA-ARTICULAR; INTRALESIONAL; INTRAMUSCULAR; INTRAVENOUS; SOFT TISSUE PRN
Status: DISCONTINUED | OUTPATIENT
Start: 2024-10-28 | End: 2024-10-28

## 2024-10-28 RX ORDER — LIDOCAINE HYDROCHLORIDE 20 MG/ML
INJECTION, SOLUTION INFILTRATION; PERINEURAL PRN
Status: DISCONTINUED | OUTPATIENT
Start: 2024-10-28 | End: 2024-10-28

## 2024-10-28 RX ORDER — SODIUM CHLORIDE, SODIUM LACTATE, POTASSIUM CHLORIDE, CALCIUM CHLORIDE 600; 310; 30; 20 MG/100ML; MG/100ML; MG/100ML; MG/100ML
INJECTION, SOLUTION INTRAVENOUS CONTINUOUS PRN
Status: DISCONTINUED | OUTPATIENT
Start: 2024-10-28 | End: 2024-10-28

## 2024-10-28 RX ORDER — KETAMINE HYDROCHLORIDE 100 MG/ML
300 INJECTION, SOLUTION INTRAMUSCULAR; INTRAVENOUS ONCE
Status: DISCONTINUED | OUTPATIENT
Start: 2024-10-28 | End: 2024-10-28 | Stop reason: HOSPADM

## 2024-10-28 RX ORDER — MIDAZOLAM HYDROCHLORIDE 2 MG/ML
20 SYRUP ORAL ONCE
Status: COMPLETED | OUTPATIENT
Start: 2024-10-28 | End: 2024-10-28

## 2024-10-28 RX ADMIN — FUROSEMIDE 40 MG: 40 TABLET ORAL at 08:53

## 2024-10-28 RX ADMIN — Medication 3 MG: at 20:00

## 2024-10-28 RX ADMIN — OLANZAPINE 20 MG: 20 TABLET, ORALLY DISINTEGRATING ORAL at 20:00

## 2024-10-28 RX ADMIN — EPHEDRINE SULFATE 5 MG: 5 INJECTION INTRAVENOUS at 12:07

## 2024-10-28 RX ADMIN — METOPROLOL SUCCINATE 50 MG: 50 TABLET, EXTENDED RELEASE ORAL at 08:53

## 2024-10-28 RX ADMIN — EPHEDRINE SULFATE 5 MG: 5 INJECTION INTRAVENOUS at 12:06

## 2024-10-28 RX ADMIN — CLONIDINE HYDROCHLORIDE 0.1 MG: 0.1 TABLET ORAL at 08:53

## 2024-10-28 RX ADMIN — OLANZAPINE 10 MG: 10 TABLET, FILM COATED ORAL at 08:53

## 2024-10-28 RX ADMIN — EPHEDRINE SULFATE 5 MG: 5 INJECTION INTRAVENOUS at 12:11

## 2024-10-28 RX ADMIN — EPHEDRINE SULFATE 10 MG: 5 INJECTION INTRAVENOUS at 12:20

## 2024-10-28 RX ADMIN — GADOBUTROL 10 ML: 604.72 INJECTION INTRAVENOUS at 12:01

## 2024-10-28 RX ADMIN — PROPOFOL 60 MG: 10 INJECTION, EMULSION INTRAVENOUS at 11:39

## 2024-10-28 RX ADMIN — AMLODIPINE BESYLATE 10 MG: 5 TABLET ORAL at 08:53

## 2024-10-28 RX ADMIN — DEXAMETHASONE SODIUM PHOSPHATE 1 MG: 4 INJECTION, SOLUTION INTRAMUSCULAR; INTRAVENOUS at 13:24

## 2024-10-28 RX ADMIN — PROPOFOL 150 MCG/KG/MIN: 10 INJECTION, EMULSION INTRAVENOUS at 11:39

## 2024-10-28 RX ADMIN — MIDAZOLAM HYDROCHLORIDE 20 MG: 2 SYRUP ORAL at 10:57

## 2024-10-28 RX ADMIN — SODIUM CHLORIDE, POTASSIUM CHLORIDE, SODIUM LACTATE AND CALCIUM CHLORIDE: 600; 310; 30; 20 INJECTION, SOLUTION INTRAVENOUS at 11:37

## 2024-10-28 RX ADMIN — CLONIDINE HYDROCHLORIDE 0.1 MG: 0.1 TABLET ORAL at 20:00

## 2024-10-28 RX ADMIN — LIDOCAINE HYDROCHLORIDE 60 MG: 20 INJECTION, SOLUTION INFILTRATION; PERINEURAL at 11:37

## 2024-10-28 ASSESSMENT — ACTIVITIES OF DAILY LIVING (ADL)
ADLS_ACUITY_SCORE: 0
ORAL_HYGIENE: INDEPENDENT
ADLS_ACUITY_SCORE: 0
LAUNDRY: WITH SUPERVISION
HYGIENE/GROOMING: INDEPENDENT
ORAL_HYGIENE: INDEPENDENT
ADLS_ACUITY_SCORE: 0
DRESS: INDEPENDENT
HYGIENE/GROOMING: INDEPENDENT
ADLS_ACUITY_SCORE: 0
DRESS: INDEPENDENT
ADLS_ACUITY_SCORE: 0

## 2024-10-28 NOTE — PLAN OF CARE
Problem: Psychotic Signs/Symptoms  Goal: Improved Behavioral Control (Psychotic Signs/Symptoms)  Outcome: Progressing  Intervention: Manage Behavior  Recent Flowsheet Documentation  Taken 10/28/2024 1800 by Pool Urbina RN  De-Escalation Techniques: verbally redirected     Problem: Anxiety  Goal: Anxiety Reduction or Resolution  Outcome: Progressing  Intervention: Promote Anxiety Reduction  Recent Flowsheet Documentation  Taken 10/28/2024 1800 by Pool Urbina RN  Supportive Measures:   verbalization of feelings encouraged   positive reinforcement provided   active listening utilized  Family/Support System Care:   involvement promoted   presence promoted   self-care encouraged   support provided   Goal Outcome Evaluation:    Plan of Care Reviewed With: patient      No significant change in behavior observed. Patient was still trying to leave the building . On multiple occasion, he was standing by the main entrance with folders in his hands trying to exit. Mentation was poor as he lacked insight to his situations and environment. He was still disorganized, disoriented, and intrusive and needed to be repeatedly redirected by SIO staff. He initially declined his bedtime medications, but eventually took them after multiple attempts. BP was normal and he took a shower.

## 2024-10-28 NOTE — PLAN OF CARE
Pt continues on SIO 1:1 for disorganized behavior and elopement risk. He was initially irritable this morning and strongly opposed to going to MRI, but then was cooperative and pleasant. [facility] team called for transport to pre-op d/t high elopement tendencies. He denies all mental health symptoms upon assessment. Pt is med compliant, no PRNs received. Writer unable to give remaining morning meds (lisinopril, lipitor, vitamin D, sensipar) d/t increased level of sedation. Pt sleeping upon return from MRI, did not wake up in transfer from [facility] to unit bed. Pt propped up with additional pillows to keep head elevated. Pt has not eaten yet today d/t being NPO and then being sedated upon return to unit, he did shower this morning; no other concerns at this time.    Problem: Psychotic Signs/Symptoms  Goal: Improved Behavioral Control (Psychotic Signs/Symptoms)  Outcome: Progressing   Goal Outcome Evaluation:    Plan of Care Reviewed With: patient

## 2024-10-28 NOTE — PLAN OF CARE
BEH IP Unit Acuity Rating Score (UARS)  Patient is given one point for every criteria they meet.    CRITERIA SCORING   On a 72 hour hold, court hold, committed, stay of commitment, or revocation. 1    Patient LOS on BEH unit exceeds 20 days. 0  LOS: 16   Patient under guardianship, 55+, otherwise medically complex, or under age 11. 1   Suicide ideation without relief of precipitating factors. 0   Current plan for suicide. 0   Current plan for homicide. 0   Imminent risk or actual attempt to seriously harm another without relief of factors precipitating the attempt. 1   Severe dysfunction in daily living (ex: complete neglect for self care, extreme disruption in vegetative function, extreme deterioration in social interactions). 1   Recent (last 7 days) or current physical aggression in the ED or on unit. 1 - last 10/22   Restraints or seclusion episode in past 72 hours. 0   Recent (last 7 days) or current verbal aggression, agitation, yelling, etc., while in the ED or unit. 1 - last 10/26   Active psychosis. 1   Need for constant or near constant redirection (from leaving, from others, etc).  1   Intrusive or disruptive behaviors. 1   Patient requires 3 or more hours of individualized nursing care per 8-hour shift (i.e. for ADLs, meds, therapeutic interventions). 1   TOTAL 10

## 2024-10-28 NOTE — ANESTHESIA POSTPROCEDURE EVALUATION
Patient: Estevan Aaron    Procedure: Procedure(s):  1.5 MRI Brain       Anesthesia Type:  MAC    Note:  Disposition: Inpatient   Postop Pain Control: Uneventful            Sign Out: Well controlled pain   PONV: No   Neuro/Psych: Uneventful            Sign Out: Acceptable/Baseline neuro status   Airway/Respiratory: Uneventful            Sign Out: Acceptable/Baseline resp. status   CV/Hemodynamics: Uneventful            Sign Out: Acceptable CV status; No obvious hypovolemia; No obvious fluid overload   Other NRE: NONE   DID A NON-ROUTINE EVENT OCCUR? No           Last vitals:  Vitals Value Taken Time   /76 10/28/24 1327   Temp 36.4  C (97.6  F) 10/28/24 1245   Pulse 61 10/28/24 1305   Resp 16 10/28/24 1305   SpO2 93 % 10/28/24 1329   Vitals shown include unfiled device data.    Electronically Signed By: Jorge Long DO  October 28, 2024  1:29 PM

## 2024-10-28 NOTE — PROVIDER NOTIFICATION
Dr. Long bedside to assess pt; Dr. Long informed that [facility] does not have medical beds that elevate, but per RN from [facility], they have recliners and foam wedges they could use to have pt sleep elevated post operatively because pt does snore and likely have undiagnosed ADAM. Dr. Long explained pt would be more sleepy today because of their baseline medications as well as the anesthesia medications. Dr. Long clears pt to transfer back to [facility] on room air and to sleep elevated as much as possible for the rest of the day. Pt is stable VSS; denies pain; denies nausea. Pt is 93% on room air while elevated. Pt is able to clear on own.

## 2024-10-28 NOTE — PROGRESS NOTES
----------------------------------------------------------------------------------------------------------  [facility]  Psychiatry Progress Note  Hospital Day #16     Interim History:     The patient's care was discussed with the treatment team and chart notes were reviewed.    Vitals: VSS, intermittently bradycardic to 50s   Sleep: 6 hours (10/27/24 0642)  Scheduled medications: Took all scheduled medications as prescribed  Psychiatric PRNs: None    Staff Report:   -Intermittently agitated  -Demands to have his phone   -Standing by main door, banging on door  -Telling staff he wants to leave   -Disorganized  -Sometimes redirectable, sometimes very resistant and becomes agitated     Please see staff notes for details      Subjective:     Patient Interview:   Patient interview deferred as patient is very sedated after his MRI.    ROS:  Negative except for above.      Objective:     Vitals:  /70 (BP Location: Left arm, Patient Position: Sitting, Cuff Size: Adult Regular)   Pulse 53   Temp 98.1  F (36.7  C) (Oral)   Resp 18   Wt 104.8 kg (231 lb 1.6 oz)   SpO2 94%   BMI 37.30 kg/m      Allergies:  No Known Allergies    Current Medications:  Scheduled:  Current Facility-Administered Medications   Medication Dose Route Frequency Provider Last Rate Last Admin    acetaminophen (TYLENOL) tablet 650 mg  650 mg Oral Q4H PRN Nikki Caceres MD   650 mg at 10/17/24 0837    alum & mag hydroxide-simethicone (MAALOX) suspension 30 mL  30 mL Oral Q4H PRN Nikki Caceres MD   30 mL at 10/17/24 0837    amLODIPine (NORVASC) tablet 10 mg  10 mg Oral Daily Presley Barrera MD   10 mg at 10/27/24 0801    atorvastatin (LIPITOR) tablet 40 mg  40 mg Oral Daily Nikki Caceres MD   40 mg at 10/27/24 0800    cholecalciferol (VITAMIN D3) capsule 1,250 mcg  1,250 mcg Oral Q7 Days Evelia Grimm DO   1,250 mcg at 10/22/24 1124    cinacalcet (SENSIPAR) tablet 30 mg  30 mg Oral  Daily Presley Barrera MD   30 mg at 10/27/24 0801    cloNIDine (CATAPRES) tablet 0.1 mg  0.1 mg Oral BID Presley Barrera MD   0.1 mg at 10/27/24 2056    furosemide (LASIX) tablet 40 mg  40 mg Oral Daily Presley Barrera MD   40 mg at 10/27/24 0800    gabapentin (NEURONTIN) capsule 100 mg  100 mg Oral Q6H PRN Nikki Caceres MD        lisinopril (ZESTRIL) tablet 40 mg  40 mg Oral Daily Presley Barrera MD   40 mg at 10/27/24 0801    melatonin tablet 3 mg  3 mg Oral At Bedtime Presley Barrera MD   3 mg at 10/27/24 2056    metoprolol succinate ER (TOPROL XL) 24 hr tablet 50 mg  50 mg Oral Daily Presley Barrera MD   50 mg at 10/27/24 0801    nicotine (NICODERM CQ) 14 MG/24HR 24 hr patch 1 patch  1 patch Transdermal Daily Evelia Grimm DO   1 patch at 10/27/24 0801    nicotine (NICODERM CQ) 21 MG/24HR 24 hr patch 1 patch  1 patch Transdermal Daily PRN Britt Kang MD   1 patch at 10/13/24 1611    nicotine (NICORETTE) gum 2 mg  2 mg Buccal Q1H PRN González Garcia MD   2 mg at 10/24/24 1254    OLANZapine (zyPREXA) tablet 5 mg  5 mg Oral TID PRN Evelia Grimm, DO   5 mg at 10/24/24 1101    Or    OLANZapine (zyPREXA) injection 5 mg  5 mg Intramuscular TID PRN Evelia Grimm DO        OLANZapine (zyPREXA) tablet 10 mg  10 mg Oral QAM Evelia Grimm DO   10 mg at 10/27/24 0801    OLANZapine zydis (zyPREXA) ODT tab 20 mg  20 mg Oral At Bedtime Presley Barrera MD   20 mg at 10/27/24 2056    polyethylene glycol (MIRALAX) Packet 17 g  17 g Oral Daily PRN Nikki Caceres MD        traZODone (DESYREL) half-tab 25 mg  25 mg Oral At Bedtime PRN Evelia Grimm DO   25 mg at 10/22/24 2225    Or    traZODone (DESYREL) tablet 50 mg  50 mg Oral At Bedtime PRN Evelia Grimm DO         PRN:  Current Facility-Administered Medications   Medication Dose Route Frequency Provider Last Rate Last Admin    acetaminophen (TYLENOL) tablet 650 mg  650 mg Oral Q4H PRN Nikki Caceres MD   650 mg at 10/17/24 0837    alum & mag hydroxide-simethicone  (MAALOX) suspension 30 mL  30 mL Oral Q4H PRN Nikki Caceres MD   30 mL at 10/17/24 0837    amLODIPine (NORVASC) tablet 10 mg  10 mg Oral Daily Presley Barrera MD   10 mg at 10/27/24 0801    atorvastatin (LIPITOR) tablet 40 mg  40 mg Oral Daily Nikki Caceres MD   40 mg at 10/27/24 0800    cholecalciferol (VITAMIN D3) capsule 1,250 mcg  1,250 mcg Oral Q7 Days Evelia Grimm DO   1,250 mcg at 10/22/24 1124    cinacalcet (SENSIPAR) tablet 30 mg  30 mg Oral Daily Presley Barrera MD   30 mg at 10/27/24 0801    cloNIDine (CATAPRES) tablet 0.1 mg  0.1 mg Oral BID Presley Barrera MD   0.1 mg at 10/27/24 2056    furosemide (LASIX) tablet 40 mg  40 mg Oral Daily Presley Barrera MD   40 mg at 10/27/24 0800    gabapentin (NEURONTIN) capsule 100 mg  100 mg Oral Q6H PRN Nikki Caceres MD        lisinopril (ZESTRIL) tablet 40 mg  40 mg Oral Daily Presley Barrera MD   40 mg at 10/27/24 0801    melatonin tablet 3 mg  3 mg Oral At Bedtime Presley Barrera MD   3 mg at 10/27/24 2056    metoprolol succinate ER (TOPROL XL) 24 hr tablet 50 mg  50 mg Oral Daily Presley Barrera MD   50 mg at 10/27/24 0801    nicotine (NICODERM CQ) 14 MG/24HR 24 hr patch 1 patch  1 patch Transdermal Daily Evelia Grimm DO   1 patch at 10/27/24 0801    nicotine (NICODERM CQ) 21 MG/24HR 24 hr patch 1 patch  1 patch Transdermal Daily PRN Britt Kang MD   1 patch at 10/13/24 1611    nicotine (NICORETTE) gum 2 mg  2 mg Buccal Q1H PRN González Garcia MD   2 mg at 10/24/24 1254    OLANZapine (zyPREXA) tablet 5 mg  5 mg Oral TID PRN Evelia Grimm DO   5 mg at 10/24/24 1101    Or    OLANZapine (zyPREXA) injection 5 mg  5 mg Intramuscular TID PRN Evelia Grimm DO        OLANZapine (zyPREXA) tablet 10 mg  10 mg Oral QAM Evelia Grimm DO   10 mg at 10/27/24 0801    OLANZapine zydis (zyPREXA) ODT tab 20 mg  20 mg Oral At Bedtime Presley Barrera MD   20 mg at 10/27/24 2056    polyethylene glycol (MIRALAX) Packet 17 g  17 g Oral Daily PRN Nikki Caceres,  MD        traZODone (DESYREL) half-tab 25 mg  25 mg Oral At Bedtime PRN Evelia Grimm DO   25 mg at 10/22/24 2225    Or    traZODone (DESYREL) tablet 50 mg  50 mg Oral At Bedtime PRN Evelia Grimm DO         Labs and Imaging:  Data this admission:  - CBC unremarkable  - CMP unremarkable  - TSH normal  - UDS negative  - Vit D low  - Hgb A1c 5.9 (10/13/24)  - Lipids unremarkable  - Vit B12 normal  - Folate normal  - Urinalysis unremarkable  - EKG normal sinus rhythm, QTc 390  - Head CT showed no acute changes  - HIV non reactive  - Treponema antibody non reactive  - ESR wnl   - Ceruloplasmin wnl   - BOOGIE negative  - Lyme negative     Parathyroid hormone:   10/13: 85    Calcium:  10/14: 11.4  10/16: 10.3  10/17: 10.3  10/19: 9.8  10/21: 10.6  10/23: 10.3    Albumin:  10/14: 4.3  10/16: 4.3  10/17: 4.1  10/19: 4.2  10/21: 4.5  10/23: 4.3      Mental Status Exam:     Patient asleep  in bed. Deferred MSE.      Psychiatric Assessment     Estevan Aaron is a 64 year old male with previous psychiatric diagnoses of GEORGINA admitted from the ER on 10/12/2024 due to concern for HI and psychosis in the context of medical issues (hyperthyroidism, hypercalcemia), psychosocial stressors including with recent divorce and selling his home. This is the patient's first psychiatric hospitalization. Significant symptoms on admission included delusions of persecution with grandiose beliefs, homicidal ideations, as well as disorganized thinking and behavior. The MSE on admission was pertinent for a thought process which was perseverative, circumstantial, tangential, disorganized and tangential; with rambling and looseness of associations,. Psychological contributions to presentation included lack of insight. Social factors contributing to presentation include isolation, recent divorce, selling his house, and moving from hotel to hotel. Biological contributions to presentation included a history of hyperparathyroidism with chronic hypercalcemia per  charts as well as a history of methamphetamine use per collateral from ex-wife.     History was difficult to obtain due to the patient's severe disorganized thinking on interview; he was observed with 1) persecutory delusions pertaining to the realtor and gang members, 2) disorganized behavior as these delusions have led him to flee to different hotels to stay safe/ has called  complaining of being targeted and 3) auditory hallucinations of voices for the past 12 months. Per collateral from ex-wife, Santos has had paranoid ideations since they first met. He has always felt like people are out to get him or trying to rip him off. She says that he has had visual hallucinations (he will point things out that the rest of the family can't see) for a long time, but the family would just go along with him to avoid making him angry. Things began to worsen around the beginning of the COVID pandemic, when Santos became more isolated and the family started to notice cognitive issues such as impaired memory. Immediately prior to this hospitalization, the ex-wife found Santos in a hotel room with a generator full of gasoline, binoculars, and hunting equipment. This time course does not suggest acute psychosis, however given the patient has never had a full psychiatric work-up, it is reasonable to obtain a first-episode psychosis work-up.     Other things that could be contributing to his presentation is hyperparathyroidism with hypercalcemia. However, patient's calcium returned to normal limits on 10/16, and patient continues to have psychosis.    Patient also continues to be disoriented and his behaviors appear to worsen in the evenings. This raises concern for underlying neurocognitive disorder. Patient received MRI brain with and without contrast which showed frontal and parietal atrophy greater than would be expected for patient's age. This is consistent with neurocognitive disorder. Therefore, will attempt to lower patient's  antipsychotic and see if he begins to improve.     In summary, the patient's diagnosis is still in evolution. He will likely benefit from further assessment and management this admission. Given that he currently has psychosis, patient warrants inpatient psychiatric hospitalization to maintain his safety.     Psychiatric Plan by Diagnosis      Today's changes:  - Decrease morning Zyprexa to 5 mg      # Bipolar I Disorder, current manic episode with psychotic features    1. Medications:  - Discontinued PTA fluoxetine 40 mg, consider restarting at a later time   - Olanzapine 5 mg during day and 20 mg at bedtime     2. Pertinent Labs/Monitoring:   - See above     3. Additional Plans:  - Patient will be treated in therapeutic milieu with appropriate individual and group therapies as described     # Unspecified anxiety vs Generalized Anxiety Disorder  - Monitor for symptoms.  - Fluoxetine held due to suspicion of ongoing manic symptoms.     Psychiatric Hospital Course:      Estevan Aaron was admitted to [facility] on a 72 hour hold.   Medications:  PTA fluoxetine was held due to concern for worsening of zelalem   New medications started at the time of admission include Zyprexa.   Increased olanzapine 10 mg at bedtime was to 10 mg BID (10/14)   Increased olanzapine 10 mg BID to 10 mg during day and 15 mg at bedtime (10/15)  10/21: increased Zyprexa pm dose from 15 to 20 mg  10/23: started melatonin 3 mg at 7 pm to help patient with circadian rhythm as he has been staying up throughout the night and sleeping a lot during the day and there is some concern for delirium   10/24: consulted anesthesia for MRI brain   10/28: MRI brain complete, decrease AM zyprexa from 10 to 5 mg     Care conference 10/18/24 with daughter Maria C and ex-wife Clifford  - Report that patient has always been paranoid since ex-wife first met him. She describes him always feeling like he is getting ripped off.   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 They reported once incidence in which he made a bid on a job and the client paid him, but he never ended up finishing the job  - Wife and him  officially this year, and since selling the house several months ago, patient has been moving from hotel to hotel due to thinking people are out to get him   - When they were selling their house, patient threatened realtors and felt he was being ripped off   - Clifford reports that she started to be afraid to be around him alone  - When he was found in the hotel, he had a generator full of gasoline, binoculars, bullets, and hunting equipment.    10/21/24: updated daughter on Santos's treatment plan     10/23/24: updated daughter on Santos's treatment plan     10/25/24: updated daughter on Santos's treatment plan, including plan to try and get MRI brain done under sedation. She said that Santos's grandfather had dementia and his sister has a brain tumor.     10/28/24: updated daughter on Santos's MRI results. She is planning on coming into town soon.     The risks, benefits, alternatives, and side effects were discussed and understood by the patient and other caregivers.     Medical Assessment and Plan     Medical diagnoses to be addressed this admission:    # Hypertension  - Continue PTA medications  Furosemide 40 mg daily  Lisinopril 40 mg daily  Metoprolol 50 mg daily  Amlodipine 10 mg daily  Clonidine 0.1 mg BID     # Hyperlipidemia  - Continue PTA Atorvastatin 40 mg     # Hyperparathyroidism  # Hypercalcemia, hypophosphoremia   Increased Ca level to 10.9  and decreased Ph level to 2.3 in the ED. Suspicion patient's hypercalcemia could be contributing to symptoms of psychosis.  - Consulted endocrinology, who started cinacalcet 30 mg BID on 10/13  - 10/16 endocrinology recommended continuing to trend calcium and albumin to make sure patient does not become hypocalcemic and recommended holding cinacalcet   - 10/17, calcium and albumin wnl, endo recommended cinacalcet 30 mg once daily   - 10/21, per endo continue cincaclcet and recheck calcium and albumin on October 24  - 10/23: per endo, patient needs to follow up outpatient for further management of hyperparathyroidism      Medical course: Patient was physically examined by the ED prior to being transferred to the unit and was found to be medically stable and appropriate for admission.      Consults: Psychiatry, Endocrinology (follow-up of hyperthyroidism / hypercalcemia and hypertension)     Checklist     Legal Status: Court hold     Safety Assessment:   Behavioral Orders   Procedures    Assault precautions    Code 1 - Restrict to Unit    Elopement precautions    Routine Programming     As clinically indicated    Status 15     Every 15 minutes.    Status Individual Observation     1:1 during day shift, 2:1 during evening and night shift     Patient SIO status reviewed with team/RN.  Please also refer to RN/team documentation for add'l detail.    -SIO staff to monitor following which have contributed to patient being on SIO:  Pt has psychosis regarding being followed and attacked, very paranoid, HI    -Possible interventions SIO staff could use to support patient's treatment progress:  Redirect and reassure  -When following observed, team will review discontinuation of SIO:  Psychosis improves     Order Specific Question:   CONTINUOUS 24 hours / day     Answer:   Other     Order Specific Question:   Specify distance     Answer:   10 feet, 5 feet when close to the doors     Order Specific Question:   Indications for SIO      Answer:   Assault risk     Order Specific Question:   Indications for SIO     Answer:   Severe intrusiveness     Risk Assessment:  Risk for harm is elevated.  Risk factors: impulsive and past behaviors  Protective factors: family     Disposition: Pending stabilization, medication optimization, & development of a safe discharge plan.      Attestations     This patient was seen and discussed with my attending physician.  Presley Barrera MD  PGY-1 Psychiatry Resident      Attestation:  This patient has been seen and evaluated by me, Pete Smith MD.  I have discussed this patient with the house staff team including the resident and/or medical student and I agree with the findings and plan in this note.    I have reviewed today's vital signs, medications, labs and imaging. Pete Smith MD , PhD.

## 2024-10-28 NOTE — PROGRESS NOTES
"PACU to Inpatient Nursing Handoff    Patient Estevan Aaron is a 64 year old male who speaks English.   Procedure Procedure(s):  1.5 MRI Brain   Surgeon(s) Primary: GENERIC ANESTHESIA PROVIDER     No Known Allergies    Isolation  No active isolations     Past Medical History   has a past medical history of Kidney stone, Serum calcium elevated, and Tobacco use disorder.    Anesthesia General   Dermatome Level     Preop Meds Not applicable   Nerve block Not applicable   Intraop Meds Versed 20mg @1057; ephedrine 25mg total   Local Meds No   Antibiotics Not applicable     Pain Patient Currently in Pain: no   PACU meds  Not applicable   PCA / epidural No   Capnography     Telemetry ECG Rhythm: Sinus bradycardia   Inpatient Telemetry Monitor Ordered? No        Labs Glucose Lab Results   Component Value Date     10/10/2024    GLC 98 01/18/2022    GLC 86 04/15/2021       Hgb Lab Results   Component Value Date    HGB 14.2 10/27/2024    HGB 16.1 11/03/2020       INR No results found for: \"INR\"   PACU Imaging Not applicable     Wound/Incision     CMS        Equipment Not applicable   Other LDA       IV Access Peripheral IV 10/28/24 Right Lower forearm (Active)   Site Assessment WDL 10/28/24 1224   Line Status Infusing 10/28/24 1224   Dressing Transparent 10/28/24 1224   Dressing Status clean;dry;intact 10/28/24 1224   Phlebitis Scale 0-->no symptoms 10/28/24 1224   Infiltration? no 10/28/24 1224   Number of days: 0      Blood Products Not applicable EBL none mL   Intake/Output    Drains / Alberto     Time of void PreOp Time of Void Prior to Procedure: 0800 (10/28/24 1037)    PostOp      Diapered? No   Bladder Scan     PO    PACU     Vitals    B/P: 98/65  T: 97.8  F (36.6  C)    Temp src: Temporal  P:  Pulse: 51 (10/28/24 1245)          R: 19  O2:  SpO2: 97 %    O2 Device: Simple face mask (10/28/24 1224)    Oxygen Delivery: 5 LPM (10/28/24 1224)         Family/support present Daughter- Maria C   Patient belongings   "   Patient transported on cart   DC meds/scripts (obs/outpt) Not applicable   Inpatient Pain Meds Released? No       Special needs/considerations None   Tasks needing completion None       Maria L Acharya, RN  [facility]

## 2024-10-28 NOTE — PLAN OF CARE
Problem: Adult Behavioral Health Plan of Care  Goal: Plan of Care Review  Outcome: Progressing  Flowsheets (Taken 10/27/2024 1645)  Patient Agreement with Plan of Care: agrees   Goal Outcome Evaluation:    Plan of Care Reviewed With: patient          Pt continued on SIO, 2:110 Ft for assault risk, severe intrusiveness, and elopement risk. He was intermittently visible at the exit door requesting to leave. He was redirected away from the exit door several times. Sometimes he follows redirections but sometimes he was very resistant and irritated. He had minimal interaction with peers. His mood was labile. Affect was flat and blunted. Eating and drinking okay. Ate 100% of his snacks and dinner. Hygiene was poor and was encouraged to take a shower but he refused. His vitals were WNL. He denied pain and all mental health psych symptoms all shift even though he was observed responding to visual hallucinations. Pt thought he was fishing while he was sitting in the dinning room and lounge area. He contracted for safety. He was medication compliant. No prn was given this shift. Pt has MRI of the Brain scheduled at 11 am tomorrow morning 10/28/24. Per morning report, pt should be NPO from mid night. No safety concern noted at this time. Will continue to monitor and will assist if need arise.

## 2024-10-28 NOTE — ANESTHESIA PREPROCEDURE EVALUATION
Anesthesia Pre-Procedure Evaluation    Patient: Estevan Aaron   MRN: [id number] : 1960        Procedure : Procedure(s):  1.5 MRI Brain          Past Medical History:   Diagnosis Date    Kidney stone     Serum calcium elevated     Tobacco use disorder     tobacco quit line referral done 06      Past Surgical History:   Procedure Laterality Date    ROTATOR CUFF REPAIR RT/LT Right       No Known Allergies   Social History     Tobacco Use    Smoking status: Every Day     Current packs/day: 1.00     Average packs/day: 1 pack/day for 35.0 years (35.0 ttl pk-yrs)     Types: Cigarettes    Smokeless tobacco: Never   Substance Use Topics    Alcohol use: Yes     Alcohol/week: 0.0 standard drinks of alcohol      Wt Readings from Last 1 Encounters:   10/27/24 104.8 kg (231 lb 1.6 oz)        Anesthesia Evaluation   Pt has had prior anesthetic.     No history of anesthetic complications       ROS/MED HX  ENT/Pulmonary:  - neg pulmonary ROS     Neurologic:  - neg neurologic ROS     Cardiovascular:     (+)  hypertension- -   -  - -                                      METS/Exercise Tolerance: >4 METS    Hematologic:  - neg hematologic  ROS     Musculoskeletal:  - neg musculoskeletal ROS     GI/Hepatic:  - neg GI/hepatic ROS     Renal/Genitourinary:     (+)       Nephrolithiasis ,       Endo: Comment: hyperparathyroid      Psychiatric/Substance Use:     (+) psychiatric history other (comment) (Psychosis)       Infectious Disease:  - neg infectious disease ROS     Malignancy:  - neg malignancy ROS     Other:  - neg other ROS          Physical Exam    Airway  airway exam normal      Mallampati: I   TM distance: > 3 FB   Neck ROM: full   Mouth opening: > 3 cm    Respiratory Devices and Support         Dental       (+) Completely normal teeth      Cardiovascular   cardiovascular exam normal       Rhythm and rate: regular and normal     Pulmonary   pulmonary exam normal        breath sounds clear to auscultation  "      Other findings: [unfilled]     OUTSIDE LABS:  CBC:   Lab Results   Component Value Date    WBC 7.5 10/27/2024    WBC 9.9 10/10/2024    HGB 14.2 10/27/2024    HGB 14.2 10/10/2024    HCT 46.5 10/27/2024    HCT 46.4 10/10/2024     10/27/2024     10/10/2024     BMP:   Lab Results   Component Value Date     10/10/2024     02/20/2024    POTASSIUM 4.2 10/10/2024    POTASSIUM 4.8 02/20/2024    CHLORIDE 102 10/10/2024    CHLORIDE 102 02/20/2024    CO2 22 10/10/2024    CO2 29 02/20/2024    BUN 19.2 10/10/2024    BUN 14.8 02/20/2024    CR 1.05 10/10/2024    CR 1.00 02/20/2024     (H) 10/10/2024    GLC 97 02/20/2024     COAGS: No results found for: \"PTT\", \"INR\", \"FIBR\"  POC: No results found for: \"BGM\", \"HCG\", \"HCGS\"  HEPATIC:   Lab Results   Component Value Date    ALBUMIN 4.3 10/23/2024    PROTTOTAL 6.5 10/10/2024    ALT 26 10/10/2024    AST 35 10/10/2024    ALKPHOS 124 10/10/2024    BILITOTAL 0.5 10/10/2024     OTHER:   Lab Results   Component Value Date    A1C 5.9 (H) 10/13/2024    UDAY 10.3 10/23/2024    PHOS 2.3 (L) 10/10/2024    MAG 2.1 10/10/2024    TSH 2.99 10/10/2024    T4 1.13 10/13/2024    SED 2 10/23/2024       Anesthesia Plan    ASA Status:  2    NPO Status:  NPO Appropriate    Anesthesia Type: MAC.     - Reason for MAC: Deep or markedly invasive procedure (G8)   Induction: Propofol.   Maintenance: N/A.        Consents    Anesthesia Plan(s) and associated risks, benefits, and realistic alternatives discussed. Questions answered and patient/representative(s) expressed understanding.     - Discussed:     - Discussed with:  Patient       Use of blood products discussed: No .     Postoperative Care    Pain management: Multi-modal analgesia, Oral pain medications.   PONV prophylaxis: Ondansetron (or other 5HT-3), Dexamethasone or Solumedrol     Comments:               Jorge Long, DO    I have reviewed the pertinent notes and labs in the chart from the past 30 days and " "(re)examined the patient.  Any updates or changes from those notes are reflected in this note.               # Hypertension: Noted on problem list           # Obesity: Estimated body mass index is 37.3 kg/m  as calculated from the following:    Height as of 10/10/24: 1.676 m (5' 6\").    Weight as of this encounter: 104.8 kg (231 lb 1.6 oz).      # Financial/Environmental Concerns:          "

## 2024-10-28 NOTE — ANESTHESIA CARE TRANSFER NOTE
Patient: Estevan Aaron    Procedure: Procedure(s):  1.5 MRI Brain       Diagnosis: Psychosis (H) [F29]  Diagnosis Additional Information: No value filed.    Anesthesia Type:   MAC     Note:    Oropharynx: oropharynx clear of all foreign objects and spontaneously breathing  Level of Consciousness: drowsy  Oxygen Supplementation: face mask  Level of Supplemental Oxygen (L/min / FiO2): 6  Independent Airway: airway patency satisfactory and stable  Dentition: dentition unchanged  Vital Signs Stable: post-procedure vital signs reviewed and stable  Report to RN Given: handoff report given  Patient transferred to: PACU    Handoff Report: Identifed the Patient, Identified the Reponsible Provider, Reviewed the pertinent medical history, Discussed the surgical course, Reviewed Intra-OP anesthesia mangement and issues during anesthesia, Set expectations for post-procedure period and Allowed opportunity for questions and acknowledgement of understanding      Vitals:  Vitals Value Taken Time   /68 10/28/24 1300   Temp 36.4  C (97.6  F) 10/28/24 1245   Pulse 61 10/28/24 1305   Resp 16 10/28/24 1305   SpO2 94 % 10/28/24 1314   Vitals shown include unfiled device data.    Electronically Signed By: DILCIA Perera CRNA  October 28, 2024  1:17 PM

## 2024-10-28 NOTE — PLAN OF CARE
10/28/24 0913   Individualization/Patient Specific Goals   Patient Personal Strengths resourceful;resilient;positive vocational history;ability to maintain sobriety   Patient Vulnerabilities housing insecurity;lacks insight into illness;poor impulse control   Interprofessional Rounds   Summary Discussed patient progress and continued confusion, verbal agitation, and lack of insight into mental illness/hospitalization. Patient is under full commitment with Ortega hearing on 10/31/24.   Participants nursing;CTC;psychiatrist;other (see comments);pharmacy   Behavioral Team Discussion   Participants Dr. Sarah MD; Dr. Bruce MD; Adilia RN; Kylee Becerra [facility]; medical students; pharmacy resident   Progress Minimal   Anticipated length of stay 20+ days   Continued Stay Criteria/Rationale Medication management; symptom stabilization; care coordination   Medical/Physical See H&P   Precautions See below   Plan Psychiatric assessment/Medication management. Therapeutic Milieu. Individual care planning and after care planning. Patient to participate in unit groups and activities. Individual and group support on unit.   Safety Plan Completed by unit therapist prior to discharge   Anticipated Discharge Disposition home with family     PRECAUTIONS AND SAFETY    Behavioral Orders   Procedures    Assault precautions    Code 1 - Restrict to Unit    Elopement precautions    Routine Programming     As clinically indicated    Status 15     Every 15 minutes.    Status Individual Observation     1:1 during day shift, 2:1 during evening and night shift     Patient SIO status reviewed with team/RN.  Please also refer to RN/team documentation for add'l detail.    -SIO staff to monitor following which have contributed to patient being on SIO:  Pt has psychosis regarding being followed and attacked, very paranoid, HI    -Possible interventions SIO staff could use to support patient's treatment progress:  Redirect and reassure  -When  following observed, team will review discontinuation of SIO:  Psychosis improves     Order Specific Question:   CONTINUOUS 24 hours / day     Answer:   Other     Order Specific Question:   Specify distance     Answer:   10 feet, 5 feet when close to the doors     Order Specific Question:   Indications for SIO     Answer:   Assault risk     Order Specific Question:   Indications for SIO     Answer:   Severe intrusiveness       Safety  Safety WDL: WDL  Patient Location: patient room, own  Observed Behavior: angry/hostile, pacing, sitting, walking  Observed Behavior (Comment): pacing hallway  Safety Measures: environmental rounds completed, safety rounds completed  Diversional Activity: art work, journaling, music, play, puzzles, reading, television  De-Escalation Techniques: verbally redirected  Suicidality: Status 15, Minimal personal belongings in room  Assault: status continuous sight, minimal personal belongings in room  Elopement Assessment: Loitering near exit doors, Statements about wanting to leave  Elopement Interventions: status continuous sight, signs posted on unit entrance / exit doors

## 2024-10-28 NOTE — PLAN OF CARE
Problem: Sleep Disturbance  Goal: Adequate Sleep/Rest  Outcome: Progressing  Patient is being monitored per Status Individual Observation due to severe intrusiveness , assault risk and elopement risk. Number of staff required to monitor 2. Staff to patient distance 10 feet, 5 feet when close to the doors. Patient appears sleeping most of the shift. No complaints of pain and discomfort. Patient continues on q15 minutes safety checks, Patient has VH noted. The patient had 6.25 total hours of sleep this shift. Patient started NPO @ midnight-MRI procedure at 1030.  Will continue to monitor the patient and provide therapeutic intervention as needed. Will continue with current plan of care. Notify MD with any concerns.

## 2024-10-29 ENCOUNTER — TELEPHONE (OUTPATIENT)
Dept: FAMILY MEDICINE | Facility: CLINIC | Age: 64
End: 2024-10-29
Payer: COMMERCIAL

## 2024-10-29 PROCEDURE — 250N000013 HC RX MED GY IP 250 OP 250 PS 637

## 2024-10-29 PROCEDURE — 99231 SBSQ HOSP IP/OBS SF/LOW 25: CPT | Mod: GC | Performed by: PSYCHIATRY & NEUROLOGY

## 2024-10-29 PROCEDURE — 124N000002 HC R&B MH UMMC

## 2024-10-29 RX ADMIN — CINACALCET 30 MG: 30 TABLET ORAL at 08:59

## 2024-10-29 RX ADMIN — AMLODIPINE BESYLATE 10 MG: 5 TABLET ORAL at 08:59

## 2024-10-29 RX ADMIN — Medication 1 PATCH: at 08:58

## 2024-10-29 RX ADMIN — OLANZAPINE 5 MG: 5 TABLET, FILM COATED ORAL at 08:59

## 2024-10-29 RX ADMIN — CLONIDINE HYDROCHLORIDE 0.1 MG: 0.1 TABLET ORAL at 08:59

## 2024-10-29 RX ADMIN — OLANZAPINE 20 MG: 20 TABLET, ORALLY DISINTEGRATING ORAL at 22:20

## 2024-10-29 RX ADMIN — METOPROLOL SUCCINATE 50 MG: 50 TABLET, EXTENDED RELEASE ORAL at 08:59

## 2024-10-29 RX ADMIN — ATORVASTATIN CALCIUM 40 MG: 20 TABLET, FILM COATED ORAL at 08:59

## 2024-10-29 RX ADMIN — LISINOPRIL 40 MG: 10 TABLET ORAL at 08:59

## 2024-10-29 RX ADMIN — CLONIDINE HYDROCHLORIDE 0.1 MG: 0.1 TABLET ORAL at 22:21

## 2024-10-29 RX ADMIN — FUROSEMIDE 40 MG: 40 TABLET ORAL at 08:59

## 2024-10-29 RX ADMIN — Medication 3 MG: at 22:18

## 2024-10-29 ASSESSMENT — ACTIVITIES OF DAILY LIVING (ADL)
ORAL_HYGIENE: INDEPENDENT
ADLS_ACUITY_SCORE: 0
HYGIENE/GROOMING: INDEPENDENT
ADLS_ACUITY_SCORE: 0
ORAL_HYGIENE: PROMPTS;INDEPENDENT
ADLS_ACUITY_SCORE: 0
HYGIENE/GROOMING: INDEPENDENT
ADLS_ACUITY_SCORE: 0
DRESS: INDEPENDENT
ADLS_ACUITY_SCORE: 0
DRESS: INDEPENDENT

## 2024-10-29 NOTE — TELEPHONE ENCOUNTER
Spouse called to notify provider that patient had an MRI brain yesterday, which confirmed that the patient has dementia.     Patient was admitted on 10/10/24 for acute psychosis and is currently being locked up at [facility] psych enriquez. Per spouse, there is no way to contact patient right now. The family will have a meeting with the doctor on Thursday/Friday to determine next steps.     For any questions, can call spouse Sharon at [phone].

## 2024-10-29 NOTE — PLAN OF CARE
Team Note Due:  Monday    Assessment/Intervention/Current Symtoms and Care Coordination:  Chart review and met with team, discussed pt progress, symptomology, and response to treatment.  Discussed the discharge plan and any potential impediments to discharge.    Update PPS on MRI results and difficulties moving forward with MA application and MNChoice assessment referral as pt needs to consent to these as well as provide information, which he struggles to do. Per MD, pt's daughter has been encouraged to move forward with emergency POA/guardianship to be able to assist with these next steps.    Discharge Plan or Goal:  TBD - patient has been living out of hotels for the last 2 months but may be able to stay with one of his daughters upon discharge.     Barriers to Discharge:  Patient requires further psychiatric stabilization due to current symptomology, medication management with changes subject to provider, coordination with outside supports, and aftercare planning. Pt is under civil commitment.     Referral Status:  None at this time     Legal Status:  MI Commitment (Ortega pending - hearing 10/31)   [facility]: [place]  File Number: [id number]  Start and expiration date of commitment: 10/24/24 - 04/24/25    Ortega meds requested: Haldol, Prolixin, Clozaril, Risperdal, Invega, Zyprexa, Seroquel, Abilify    Future Court Hearings:  [facility] Hearing: Thursday 10/31 at [facility]    PPS:  Shelby Chowdary: [phone]  [email or web address]    Contacts:  Maria C Aaron (Daughter): [phone]   Clifford Aaron (ex-wife): [phone]      Upcoming Meetings and Dates/Important Information and next steps:  Follow commitment process  Ortega hearing 10/31  Discharge planning when appropriate  If pt needs Memory Care and needs funding assistance, pt will need to apply for MA with financial counselors before a MNChoices Assessment/SMRT can be requested for waivers.    Provisional Discharge and Change of Status needed at  discharge.

## 2024-10-29 NOTE — PLAN OF CARE
BEH IP Unit Acuity Rating Score (UARS)  Patient is given one point for every criteria they meet.    CRITERIA SCORING   On a 72 hour hold, court hold, committed, stay of commitment, or revocation. 1    Patient LOS on BEH unit exceeds 20 days. 0  LOS: 17   Patient under guardianship, 55+, otherwise medically complex, or under age 11. 1   Suicide ideation without relief of precipitating factors. 0   Current plan for suicide. 0   Current plan for homicide. 0   Imminent risk or actual attempt to seriously harm another without relief of factors precipitating the attempt. 1   Severe dysfunction in daily living (ex: complete neglect for self care, extreme disruption in vegetative function, extreme deterioration in social interactions). 1   Recent (last 7 days) or current physical aggression in the ED or on unit. 0   Restraints or seclusion episode in past 72 hours. 0   Recent (last 7 days) or current verbal aggression, agitation, yelling, etc., while in the ED or unit. 1 - last 10/26   Active psychosis. 1   Need for constant or near constant redirection (from leaving, from others, etc).  1   Intrusive or disruptive behaviors. 1   Patient requires 3 or more hours of individualized nursing care per 8-hour shift (i.e. for ADLs, meds, therapeutic interventions). 1   TOTAL 9

## 2024-10-29 NOTE — PLAN OF CARE
"  Problem: Sleep Disturbance  Goal: Adequate Sleep/Rest  Outcome: Not Progressing     Problem: Adult Behavioral Health Plan of Care  Goal: Adheres to Safety Considerations for Self and Others  Outcome: Progressing  Intervention: Develop and Maintain Individualized Safety Plan  Recent Flowsheet Documentation  Taken 10/29/2024 1100 by Rocío Curry RN  Safety Measures: environmental rounds completed  Goal: Absence of New-Onset Illness or Injury  Outcome: Progressing  Intervention: Identify and Manage Fall Risk  Recent Flowsheet Documentation  Taken 10/29/2024 1100 by Rocío Curry RN  Safety Measures: environmental rounds completed     Problem: Suicidal Behavior  Goal: Suicidal Behavior is Absent or Managed  Outcome: Progressing   Goal Outcome Evaluation:    Pt is visible in the milieu. He comes out for meals. He is medication compliant. Pt nutrition and hydration is adequate. Pt is pleasant and cooperative, medication compliant. Pt is disorganized and is seeing things. His visual hallucinations are so real to him that he sees his work projects and shows  these to staff and discusses how he plans to go about these. 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 Pt had been busy with his tactile hallucinations and he kept to himself. No elopement behaviors this shift. Pt is  confused and has tried going to other rooms. Verbal redirection provided by staff.     Pt continues to be on SIO 1:1 this shift for severe intrusiveness.     "

## 2024-10-29 NOTE — PLAN OF CARE
Initial meeting note:    Therapist introduced self to patient and discussed psychotherapy service available to patient.     Pt response: Pt was fairly disorganized but polite, not interested in meeting 1:1 currently.    Plan: Will continue to check in with Pt for 1:1 sessions.

## 2024-10-29 NOTE — PLAN OF CARE
Problem: Sleep Disturbance  Goal: Adequate Sleep/Rest  Outcome: Not Progressing   Patient alert, confused, paced the hallway, tried to open multiple doors- redirectable. Patient continuous with VH/AH, patient stated he was fishing, seeing cat and dogs, calling and talking on the phone. Patient is being monitored per Status Individual Observation due to severe intrusiveness and elopement risk.  Number of staff required to monitor 2. Staff to patient distance 10 feet, 5 feet when close to the doors. Patient didn't sleep well this shift- had 2 hour of sleep. Will continue to monitor the patient and notify MD with any issues.

## 2024-10-29 NOTE — PROGRESS NOTES
"  ----------------------------------------------------------------------------------------------------------  [facility]  Psychiatry Progress Note  Hospital Day #17     Interim History:     The patient's care was discussed with the treatment team and chart notes were reviewed.    Vitals: VSS, intermittently bradycardic to the 50s   Sleep: 2 hours (10/29/24 0600)  Scheduled medications: Took all scheduled medications as prescribed (except some morning meds due to sedated MRI)  Psychiatric PRNs: None    Staff Report:   -Got MRI without significant issues   -Later in evening was standing by main entrance trying to leave  -Disorganized, disoriented  -Did not sleep well over night     Please see staff notes for details      Subjective:     Patient Interview:   Estevan \"Santos\" Page was interviewed in his room. Reports that he slept \"perfect\" last night. Writer told patient that we tried to see him after his MRI yesterday but he was sleeping. Patient is oriented to the day and month. Not oriented to location. Thought this building was a hotel. Tells this writer that he has been unable to get a hold of his family. Writer discussed with patient that his daughter is heading into town soon. Reports that his \"kids were nice when they were little\".     Explained that we will be decreasing his medications. Patient requests red jolly ranchers.     ROS:  Negative except for above.      Objective:     Vitals:  /67 (BP Location: Right arm, Patient Position: Sitting)   Pulse 67   Temp 97.6  F (36.4  C)   Resp 16   Wt 104.8 kg (231 lb 1.6 oz)   SpO2 97%   BMI 37.30 kg/m      Allergies:  No Known Allergies    Current Medications:  Scheduled:  Current Facility-Administered Medications   Medication Dose Route Frequency Provider Last Rate Last Admin    acetaminophen (TYLENOL) tablet 650 mg  650 mg Oral Q4H PRN Nikki Caceres MD   650 mg at 10/17/24 0837    alum & mag " hydroxide-simethicone (MAALOX) suspension 30 mL  30 mL Oral Q4H PRN Nikki Caceres MD   30 mL at 10/17/24 0837    amLODIPine (NORVASC) tablet 10 mg  10 mg Oral Daily Presley Barrera MD   10 mg at 10/28/24 0853    atorvastatin (LIPITOR) tablet 40 mg  40 mg Oral Daily Nikki Caceres MD   40 mg at 10/27/24 0800    cholecalciferol (VITAMIN D3) capsule 1,250 mcg  1,250 mcg Oral Q7 Days Evelia Grimm DO   1,250 mcg at 10/22/24 1124    cinacalcet (SENSIPAR) tablet 30 mg  30 mg Oral Daily Presley Barrera MD   30 mg at 10/27/24 0801    cloNIDine (CATAPRES) tablet 0.1 mg  0.1 mg Oral BID Presley Barrera MD   0.1 mg at 10/28/24 2000    furosemide (LASIX) tablet 40 mg  40 mg Oral Daily Presley Barrera MD   40 mg at 10/28/24 0853    gabapentin (NEURONTIN) capsule 100 mg  100 mg Oral Q6H PRN Nikki Caceres MD        lisinopril (ZESTRIL) tablet 40 mg  40 mg Oral Daily Presley Barrera MD   40 mg at 10/27/24 0801    melatonin tablet 3 mg  3 mg Oral At Bedtime Presley Barrera MD   3 mg at 10/28/24 2000    metoprolol succinate ER (TOPROL XL) 24 hr tablet 50 mg  50 mg Oral Daily Presley Barrera MD   50 mg at 10/28/24 0853    nicotine (NICODERM CQ) 14 MG/24HR 24 hr patch 1 patch  1 patch Transdermal Daily Evelia Grimm DO   1 patch at 10/27/24 0801    nicotine (NICODERM CQ) 21 MG/24HR 24 hr patch 1 patch  1 patch Transdermal Daily PRN Britt Kang MD   1 patch at 10/13/24 1611    nicotine (NICORETTE) gum 2 mg  2 mg Buccal Q1H PRN González Garcia MD   2 mg at 10/24/24 1254    OLANZapine (zyPREXA) tablet 5 mg  5 mg Oral TID PRN Evelia Grimm DO   5 mg at 10/24/24 1101    Or    OLANZapine (zyPREXA) injection 5 mg  5 mg Intramuscular TID PRN Evelia Grimm DO        OLANZapine (zyPREXA) tablet 5 mg  5 mg Oral QAM Presley Barrera MD        OLANZapine zydis (zyPREXA) ODT tab 20 mg  20 mg Oral At Bedtime Presley Barrera MD   20 mg at 10/28/24 2000    polyethylene glycol (MIRALAX) Packet 17 g  17 g Oral Daily PRN Nicki,  MD Nikki        traZODone (DESYREL) half-tab 25 mg  25 mg Oral At Bedtime PRN Evelia Grimm DO   25 mg at 10/22/24 2225    Or    traZODone (DESYREL) tablet 50 mg  50 mg Oral At Bedtime PRN Evelia Grimm DO         PRN:  Current Facility-Administered Medications   Medication Dose Route Frequency Provider Last Rate Last Admin    acetaminophen (TYLENOL) tablet 650 mg  650 mg Oral Q4H PRN Nikki Caceres MD   650 mg at 10/17/24 0837    alum & mag hydroxide-simethicone (MAALOX) suspension 30 mL  30 mL Oral Q4H PRN Nikki Caceres MD   30 mL at 10/17/24 0837    amLODIPine (NORVASC) tablet 10 mg  10 mg Oral Daily Presley Barrera MD   10 mg at 10/28/24 0853    atorvastatin (LIPITOR) tablet 40 mg  40 mg Oral Daily Nikki Caceres MD   40 mg at 10/27/24 0800    cholecalciferol (VITAMIN D3) capsule 1,250 mcg  1,250 mcg Oral Q7 Days Evelia Grimm DO   1,250 mcg at 10/22/24 1124    cinacalcet (SENSIPAR) tablet 30 mg  30 mg Oral Daily Presley Barrera MD   30 mg at 10/27/24 0801    cloNIDine (CATAPRES) tablet 0.1 mg  0.1 mg Oral BID Presley Barrera MD   0.1 mg at 10/28/24 2000    furosemide (LASIX) tablet 40 mg  40 mg Oral Daily Presley Barrera MD   40 mg at 10/28/24 0853    gabapentin (NEURONTIN) capsule 100 mg  100 mg Oral Q6H PRN Nikki Caceres MD        lisinopril (ZESTRIL) tablet 40 mg  40 mg Oral Daily Presley Barrera MD   40 mg at 10/27/24 0801    melatonin tablet 3 mg  3 mg Oral At Bedtime Presley Barrera MD   3 mg at 10/28/24 2000    metoprolol succinate ER (TOPROL XL) 24 hr tablet 50 mg  50 mg Oral Daily Presley Barrera MD   50 mg at 10/28/24 0853    nicotine (NICODERM CQ) 14 MG/24HR 24 hr patch 1 patch  1 patch Transdermal Daily Evelia Grimm DO   1 patch at 10/27/24 0801    nicotine (NICODERM CQ) 21 MG/24HR 24 hr patch 1 patch  1 patch Transdermal Daily PRN Britt Kang MD   1 patch at 10/13/24 1611    nicotine (NICORETTE) gum 2 mg  2 mg Buccal Q1H PRN González Garcia MD   2 mg at 10/24/24 1254     "OLANZapine (zyPREXA) tablet 5 mg  5 mg Oral TID PRN Evelia Grimm DO   5 mg at 10/24/24 1101    Or    OLANZapine (zyPREXA) injection 5 mg  5 mg Intramuscular TID PRN Evelia Grimm DO        OLANZapine (zyPREXA) tablet 5 mg  5 mg Oral QAM Presley Barrera MD        OLANZapine zydis (zyPREXA) ODT tab 20 mg  20 mg Oral At Bedtime Presley Barrera MD   20 mg at 10/28/24 2000    polyethylene glycol (MIRALAX) Packet 17 g  17 g Oral Daily PRN Nikki Caceres MD        traZODone (DESYREL) half-tab 25 mg  25 mg Oral At Bedtime PRN Evelia Grimm DO   25 mg at 10/22/24 2225    Or    traZODone (DESYREL) tablet 50 mg  50 mg Oral At Bedtime PRN Evelia Grimm DO         Labs and Imaging:  Data this admission:  - CBC unremarkable  - CMP unremarkable  - TSH normal  - UDS negative  - Vit D low  - Hgb A1c 5.9 (10/13/24)  - Lipids unremarkable  - Vit B12 normal  - Folate normal  - Urinalysis unremarkable  - EKG normal sinus rhythm, QTc 390  - Head CT showed no acute changes  - HIV non reactive  - Treponema antibody non reactive  - ESR wnl   - Ceruloplasmin wnl   - BOOGIE negative  - Lyme negative     Parathyroid hormone:   10/13: 85    Calcium:  10/14: 11.4  10/16: 10.3  10/17: 10.3  10/19: 9.8  10/21: 10.6  10/23: 10.3    Albumin:  10/14: 4.3  10/16: 4.3  10/17: 4.1  10/19: 4.2  10/21: 4.5  10/23: 4.3      Mental Status Exam:     Oriented to:  Oriented to to day and month. Not oriented to location or situation.   General:  Awake and Confused  Appearance:  appears stated age, in hospital scrubs  Behavior/Attitude:  easily distracted and suspicious  Eye Contact:  appropriate  Psychomotor: no evidence of tics, dystonia, or tardive dyskinesia, agitated, and restless no catatonia present  Speech:  appropriate volume/tone  Language: Speaks nonsensically   Mood:  \"good\"   Affect:  congruent with mood (smiling intermittently)  Thought Process:  ruminative, looseness of association, thought blocking, disorganized, and confused  Thought Content:  did " not assess SI/HI/AH/VH; delusions of paranoia   Associations:  loose  Insight:  impaired   Judgment:  impaired   Impulse control: decreased  Attention Span:  decreased  Concentration: Distractible  Recent and Remote Memory:  not formally assessed  Fund of Knowledge:  Not assessed  Muscle Strength and Tone: normal  Gait and Station: Normal     Psychiatric Assessment     Estevan Aaron is a 64 year old male with previous psychiatric diagnoses of GEORGINA admitted from the ER on 10/12/2024 due to concern for HI and psychosis in the context of medical issues (hyperthyroidism, hypercalcemia), psychosocial stressors including with recent divorce and selling his home. This is the patient's first psychiatric hospitalization. Significant symptoms on admission included delusions of persecution with grandiose beliefs, homicidal ideations, as well as disorganized thinking and behavior. The MSE on admission was pertinent for a thought process which was perseverative, circumstantial, tangential, disorganized and tangential; with rambling and looseness of associations,. Psychological contributions to presentation included lack of insight. Social factors contributing to presentation include isolation, recent divorce, selling his house, and moving from hotel to hotel. Biological contributions to presentation included a history of hyperparathyroidism with chronic hypercalcemia per charts as well as a history of methamphetamine use per collateral from ex-wife.     History was difficult to obtain due to the patient's severe disorganized thinking on interview; he was observed with 1) persecutory delusions pertaining to the realtor and gang members, 2) disorganized behavior as these delusions have led him to flee to different hotels to stay safe/ has called  complaining of being targeted and 3) auditory hallucinations of voices for the past 12 months. Per collateral from ex-wife, Santos has had paranoid ideations since they first met. He has  always felt like people are out to get him or trying to rip him off. She says that he has had visual hallucinations (he will point things out that the rest of the family can't see) for a long time, but the family would just go along with him to avoid making him angry. Things began to worsen around the beginning of the COVID pandemic, when Santos became more isolated and the family started to notice cognitive issues such as impaired memory. Immediately prior to this hospitalization, the ex-wife found Santos in a hotel room with a generator full of gasoline, binoculars, and hunting equipment. This time course does not suggest acute psychosis, however given the patient has never had a full psychiatric work-up, it is reasonable to obtain a first-episode psychosis work-up.     Other things that could be contributing to his presentation is hyperparathyroidism with hypercalcemia. However, patient's calcium returned to normal limits on 10/16, and patient continues to have psychosis.    Patient also continues to be disoriented and his behaviors appear to worsen in the evenings. This raises concern for underlying neurocognitive disorder with delirium. Patient received MRI brain with and without contrast which showed frontal and parietal atrophy greater than would be expected for patient's age. This is consistent with neurocognitive disorder. Therefore, will attempt to lower patient's antipsychotic and see if he begins to improve. Patient will likely require long-term memory care.     Given that he currently has psychosis, patient warrants inpatient psychiatric hospitalization to maintain his safety.     Psychiatric Plan by Diagnosis      Today's changes:  - None     # Bipolar I Disorder, current manic episode with psychotic features    1. Medications:  - Discontinued PTA fluoxetine 40 mg, consider restarting at a later time   - Olanzapine 5 mg during day and 20 mg at bedtime     2. Pertinent Labs/Monitoring:   - See above     3.  Additional Plans:  - Patient will be treated in therapeutic milieu with appropriate individual and group therapies as described     # Unspecified anxiety vs Generalized Anxiety Disorder  - Monitor for symptoms.  - Fluoxetine held due to suspicion of ongoing manic symptoms.     Psychiatric Hospital Course:      Estevan Aaron was admitted to [facility] on a 72 hour hold.   Medications:  PTA fluoxetine was held due to concern for worsening of zelalem   New medications started at the time of admission include Zyprexa.   Increased olanzapine 10 mg at bedtime was to 10 mg BID (10/14)   Increased olanzapine 10 mg BID to 10 mg during day and 15 mg at bedtime (10/15)  10/21: increased Zyprexa pm dose from 15 to 20 mg  10/23: started melatonin 3 mg at 7 pm to help patient with circadian rhythm as he has been staying up throughout the night and sleeping a lot during the day and there is some concern for delirium   10/24: consulted anesthesia for MRI brain   10/28: MRI brain complete, decrease AM zyprexa from 10 to 5 mg     Care conference 10/18/24 with daughter Maria C and ex-wife Clifford  - Report that patient has always been paranoid since ex-wife first met him. She describes him always feeling like he is getting ripped off.   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 They reported once incidence in which he made a bid on a job and the client paid him, but he never ended up finishing the job  - Wife and him  officially this year, and since selling the house several months ago, patient has been  moving from hotel to hotel due to thinking people are out to get him   - When they were selling their house, patient threatened realtors and felt he was being ripped off   - Clifford reports that she started to be afraid to be around him alone  - When he was found in the hotel, he had a generator full of gasoline, binoculars, bullets, and hunting equipment.    10/21/24: updated daughter on Santos's treatment plan     10/23/24: updated daughter on Satnos's treatment plan     10/25/24: updated daughter on Santos's treatment plan, including plan to try and get MRI brain done under sedation. She said that Santos's grandfather had dementia and his sister has a brain tumor.     10/28/24: updated daughter on Santos's MRI results. She is planning on coming into town soon.     The risks, benefits, alternatives, and side effects were discussed and understood by the patient and other caregivers.     Medical Assessment and Plan     Medical diagnoses to be addressed this admission:    # Hypertension  - Continue PTA medications  Furosemide 40 mg daily  Lisinopril 40 mg daily  Metoprolol 50 mg daily  Amlodipine 10 mg daily  Clonidine 0.1 mg BID     # Hyperlipidemia  - Continue PTA Atorvastatin 40 mg     # Hyperparathyroidism  # Hypercalcemia, hypophosphoremia   Increased Ca level to 10.9 and decreased Ph level to 2.3 in the ED. Suspicion patient's hypercalcemia could be contributing to symptoms of psychosis.  - Consulted endocrinology, who started cinacalcet 30 mg BID on 10/13  - 10/16 endocrinology recommended continuing to trend calcium and albumin to make sure patient does not become hypocalcemic and recommended holding cinacalcet   - 10/17, calcium and albumin wnl, endo recommended cinacalcet 30 mg once daily   - 10/21, per endo continue cincaclcet and recheck calcium and albumin on October 24  - 10/23: per endo, patient needs to follow up outpatient for further management of hyperparathyroidism      Medical course: Patient was physically  examined by the ED prior to being transferred to the unit and was found to be medically stable and appropriate for admission.      Consults: Psychiatry, Endocrinology (follow-up of hyperthyroidism / hypercalcemia and hypertension)     Checklist     Legal Status: Court hold     Safety Assessment:   Behavioral Orders   Procedures    Assault precautions    Code 1 - Restrict to Unit    Elopement precautions    Routine Programming     As clinically indicated    Status 15     Every 15 minutes.    Status Individual Observation     1:1 during day shift, 2:1 during evening and night shift     Patient SIO status reviewed with team/RN.  Please also refer to RN/team documentation for add'l detail.    -SIO staff to monitor following which have contributed to patient being on SIO:  Pt has psychosis regarding being followed and attacked, very paranoid, HI    -Possible interventions SIO staff could use to support patient's treatment progress:  Redirect and reassure  -When following observed, team will review discontinuation of SIO:  Psychosis improves     Order Specific Question:   CONTINUOUS 24 hours / day     Answer:   Other     Order Specific Question:   Specify distance     Answer:   10 feet, 5 feet when close to the doors     Order Specific Question:   Indications for SIO     Answer:   Assault risk     Order Specific Question:   Indications for SIO     Answer:   Severe intrusiveness     Risk Assessment:  Risk for harm is elevated.  Risk factors: impulsive and past behaviors  Protective factors: family     Disposition: Pending stabilization, medication optimization, & development of a safe discharge plan.      Attestations     This patient was seen and discussed with my attending physician.  Presley Barrera MD  PGY-1 Psychiatry Resident    Attestation:  This patient has been seen and evaluated by me, Pete Smith MD.  I have discussed this patient with the house staff team including the resident and/or medical student and I  agree with the findings and plan in this note.    I have reviewed today's vital signs, medications, labs and imaging. Pete Smith MD , PhD.

## 2024-10-30 PROCEDURE — 82175 ASSAY OF ARSENIC: CPT | Performed by: PSYCHIATRY & NEUROLOGY

## 2024-10-30 PROCEDURE — 250N000013 HC RX MED GY IP 250 OP 250 PS 637

## 2024-10-30 PROCEDURE — 124N000002 HC R&B MH UMMC

## 2024-10-30 PROCEDURE — 36415 COLL VENOUS BLD VENIPUNCTURE: CPT | Performed by: PSYCHIATRY & NEUROLOGY

## 2024-10-30 PROCEDURE — 99232 SBSQ HOSP IP/OBS MODERATE 35: CPT | Mod: GC | Performed by: PSYCHIATRY & NEUROLOGY

## 2024-10-30 PROCEDURE — 90853 GROUP PSYCHOTHERAPY: CPT

## 2024-10-30 PROCEDURE — 83825 ASSAY OF MERCURY: CPT | Performed by: PSYCHIATRY & NEUROLOGY

## 2024-10-30 RX ORDER — OLANZAPINE 15 MG/1
15 TABLET, ORALLY DISINTEGRATING ORAL AT BEDTIME
Status: DISCONTINUED | OUTPATIENT
Start: 2024-10-30 | End: 2024-11-01

## 2024-10-30 RX ADMIN — FUROSEMIDE 40 MG: 40 TABLET ORAL at 08:33

## 2024-10-30 RX ADMIN — CINACALCET 30 MG: 30 TABLET ORAL at 08:33

## 2024-10-30 RX ADMIN — Medication 1 PATCH: at 08:31

## 2024-10-30 RX ADMIN — OLANZAPINE 15 MG: 15 TABLET, ORALLY DISINTEGRATING ORAL at 19:25

## 2024-10-30 RX ADMIN — CLONIDINE HYDROCHLORIDE 0.1 MG: 0.1 TABLET ORAL at 08:33

## 2024-10-30 RX ADMIN — OLANZAPINE 5 MG: 5 TABLET, FILM COATED ORAL at 08:32

## 2024-10-30 RX ADMIN — ATORVASTATIN CALCIUM 40 MG: 20 TABLET, FILM COATED ORAL at 08:33

## 2024-10-30 RX ADMIN — AMLODIPINE BESYLATE 10 MG: 5 TABLET ORAL at 08:33

## 2024-10-30 RX ADMIN — METOPROLOL SUCCINATE 50 MG: 50 TABLET, EXTENDED RELEASE ORAL at 08:33

## 2024-10-30 RX ADMIN — CLONIDINE HYDROCHLORIDE 0.1 MG: 0.1 TABLET ORAL at 19:26

## 2024-10-30 RX ADMIN — LISINOPRIL 40 MG: 10 TABLET ORAL at 08:33

## 2024-10-30 RX ADMIN — Medication 3 MG: at 19:24

## 2024-10-30 ASSESSMENT — ACTIVITIES OF DAILY LIVING (ADL)
ADLS_ACUITY_SCORE: 0
ORAL_HYGIENE: INDEPENDENT
ADLS_ACUITY_SCORE: 0
DRESS: INDEPENDENT
ORAL_HYGIENE: INDEPENDENT
ADLS_ACUITY_SCORE: 0
HYGIENE/GROOMING: INDEPENDENT
ADLS_ACUITY_SCORE: 0
HYGIENE/GROOMING: INDEPENDENT
ADLS_ACUITY_SCORE: 0
DRESS: INDEPENDENT
ADLS_ACUITY_SCORE: 0

## 2024-10-30 NOTE — PROGRESS NOTES
"  ----------------------------------------------------------------------------------------------------------  [facility]  Psychiatry Progress Note  Hospital Day #18     Interim History:     The patient's care was discussed with the treatment team and chart notes were reviewed.    Vitals: VSS, intermittently bradycardic to the 50s   Sleep: 5.5 hours (10/30/24 0600)  Scheduled medications: Took all scheduled medications as prescribed   Psychiatric PRNs: None    Staff Report:   -More calm and cooperative  -Accepting of redirection  -Wife and daughter visited  -Appears to be having visual hallucinations     Please see staff notes for details      Subjective:     Patient Interview:   Estevan \"Santos\" Page was interviewed in the [facility].     Patient was sleeping in a chair in the community area when approached by the team. Reports that visit from daughter was \"fine\". Believes that she will also come to visit today. No orientated to the day or month. Orientated to county but not to building. Unsure if he was in a school or hotel or hospital when asked by the writer.     Says that \"they drug me up pretty good at night.\" Writer explained that we will decrease the dose of his medication today. Patient was agitated when writer asked if patient had any needs. Patient started yelling and said that he needs his phone.     ROS:  Negative except for above.      Objective:     Vitals:  BP (!) 144/83   Pulse 55   Temp 96.8  F (36  C) (Temporal)   Resp 18   Wt 104.2 kg (229 lb 11.2 oz)   SpO2 94%   BMI 37.07 kg/m      Allergies:  No Known Allergies    Current Medications:  Scheduled:  Current Facility-Administered Medications   Medication Dose Route Frequency Provider Last Rate Last Admin    acetaminophen (TYLENOL) tablet 650 mg  650 mg Oral Q4H PRN Nikki Caceres MD   650 mg at 10/17/24 0837    alum & mag hydroxide-simethicone (MAALOX) suspension 30 mL  30 mL Oral Q4H PRN Nicki, " MD Nikki   30 mL at 10/17/24 0837    amLODIPine (NORVASC) tablet 10 mg  10 mg Oral Daily Presley Barrera MD   10 mg at 10/29/24 0859    atorvastatin (LIPITOR) tablet 40 mg  40 mg Oral Daily Nikki Caceres MD   40 mg at 10/29/24 0859    cholecalciferol (VITAMIN D3) capsule 1,250 mcg  1,250 mcg Oral Q7 Days Evelia Grimm DO   1,250 mcg at 10/22/24 1124    cinacalcet (SENSIPAR) tablet 30 mg  30 mg Oral Daily Presley Barrera MD   30 mg at 10/29/24 0859    cloNIDine (CATAPRES) tablet 0.1 mg  0.1 mg Oral BID Presley Barrera MD   0.1 mg at 10/29/24 2221    furosemide (LASIX) tablet 40 mg  40 mg Oral Daily Presley Barrera MD   40 mg at 10/29/24 0859    gabapentin (NEURONTIN) capsule 100 mg  100 mg Oral Q6H PRN Nikki Caceres MD        lisinopril (ZESTRIL) tablet 40 mg  40 mg Oral Daily Presley Barrera MD   40 mg at 10/29/24 0859    melatonin tablet 3 mg  3 mg Oral At Bedtime Presley Barrera MD   3 mg at 10/29/24 2218    metoprolol succinate ER (TOPROL XL) 24 hr tablet 50 mg  50 mg Oral Daily Presley Barrera MD   50 mg at 10/29/24 0859    nicotine (NICODERM CQ) 14 MG/24HR 24 hr patch 1 patch  1 patch Transdermal Daily Evelia Grimm DO   1 patch at 10/29/24 0858    nicotine (NICODERM CQ) 21 MG/24HR 24 hr patch 1 patch  1 patch Transdermal Daily PRN Britt Kang MD   1 patch at 10/13/24 1611    nicotine (NICORETTE) gum 2 mg  2 mg Buccal Q1H PRN González Garcia MD   2 mg at 10/24/24 1254    OLANZapine (zyPREXA) tablet 5 mg  5 mg Oral TID PRN Evelia Grimm DO   5 mg at 10/24/24 1101    Or    OLANZapine (zyPREXA) injection 5 mg  5 mg Intramuscular TID PRN Evelia Grimm DO        OLANZapine (zyPREXA) tablet 5 mg  5 mg Oral QAM Presley Barrera MD   5 mg at 10/29/24 0859    OLANZapine zydis (zyPREXA) ODT tab 20 mg  20 mg Oral At Bedtime Presley Barrera MD   20 mg at 10/29/24 2220    polyethylene glycol (MIRALAX) Packet 17 g  17 g Oral Daily PRN Nikki Caceres MD        traZODone (DESYREL) half-tab 25 mg  25 mg  Oral At Bedtime PRN Evelia Grimm DO   25 mg at 10/22/24 2225    Or    traZODone (DESYREL) tablet 50 mg  50 mg Oral At Bedtime PRN Evelia Grimm DO         PRN:  Current Facility-Administered Medications   Medication Dose Route Frequency Provider Last Rate Last Admin    acetaminophen (TYLENOL) tablet 650 mg  650 mg Oral Q4H PRN Nikki Caceres MD   650 mg at 10/17/24 0837    alum & mag hydroxide-simethicone (MAALOX) suspension 30 mL  30 mL Oral Q4H PRN Nikki Caceres MD   30 mL at 10/17/24 0837    amLODIPine (NORVASC) tablet 10 mg  10 mg Oral Daily Presley Barrera MD   10 mg at 10/29/24 0859    atorvastatin (LIPITOR) tablet 40 mg  40 mg Oral Daily Nikki Caceres MD   40 mg at 10/29/24 0859    cholecalciferol (VITAMIN D3) capsule 1,250 mcg  1,250 mcg Oral Q7 Days Evelia Grimm DO   1,250 mcg at 10/22/24 1124    cinacalcet (SENSIPAR) tablet 30 mg  30 mg Oral Daily Presley Barrera MD   30 mg at 10/29/24 0859    cloNIDine (CATAPRES) tablet 0.1 mg  0.1 mg Oral BID Presley Barrera MD   0.1 mg at 10/29/24 2221    furosemide (LASIX) tablet 40 mg  40 mg Oral Daily Presley Barrera MD   40 mg at 10/29/24 0859    gabapentin (NEURONTIN) capsule 100 mg  100 mg Oral Q6H PRN Nikki Caceres MD        lisinopril (ZESTRIL) tablet 40 mg  40 mg Oral Daily Presley Barrera MD   40 mg at 10/29/24 0859    melatonin tablet 3 mg  3 mg Oral At Bedtime Presley Barrera MD   3 mg at 10/29/24 2218    metoprolol succinate ER (TOPROL XL) 24 hr tablet 50 mg  50 mg Oral Daily Presley Barrera MD   50 mg at 10/29/24 0859    nicotine (NICODERM CQ) 14 MG/24HR 24 hr patch 1 patch  1 patch Transdermal Daily Evelia Grimm DO   1 patch at 10/29/24 0858    nicotine (NICODERM CQ) 21 MG/24HR 24 hr patch 1 patch  1 patch Transdermal Daily PRN Britt Kang MD   1 patch at 10/13/24 1611    nicotine (NICORETTE) gum 2 mg  2 mg Buccal Q1H PRN González Garcia MD   2 mg at 10/24/24 1254    OLANZapine (zyPREXA) tablet 5 mg  5 mg Oral TID PRN Sirisha  "DO Evelia   5 mg at 10/24/24 1101    Or    OLANZapine (zyPREXA) injection 5 mg  5 mg Intramuscular TID PRN Evelia Grimm DO        OLANZapine (zyPREXA) tablet 5 mg  5 mg Oral QAM Presley Barrera MD   5 mg at 10/29/24 0859    OLANZapine zydis (zyPREXA) ODT tab 20 mg  20 mg Oral At Bedtime Presley Barrera MD   20 mg at 10/29/24 2220    polyethylene glycol (MIRALAX) Packet 17 g  17 g Oral Daily PRN Nikki Caceres MD        traZODone (DESYREL) half-tab 25 mg  25 mg Oral At Bedtime PRN Evelia Grimm DO   25 mg at 10/22/24 2225    Or    traZODone (DESYREL) tablet 50 mg  50 mg Oral At Bedtime PRN Evelia Grimm DO         Labs and Imaging:  Data this admission:  - CBC unremarkable  - CMP unremarkable  - TSH normal  - UDS negative  - Vit D low  - Hgb A1c 5.9 (10/13/24)  - Lipids unremarkable  - Vit B12 normal  - Folate normal  - Urinalysis unremarkable  - EKG normal sinus rhythm, QTc 390  - Head CT showed no acute changes  - HIV non reactive  - Treponema antibody non reactive  - ESR wnl   - Ceruloplasmin wnl   - BOOGIE negative  - Lyme negative     Parathyroid hormone:   10/13: 85    Calcium:  10/14: 11.4  10/16: 10.3  10/17: 10.3  10/19: 9.8  10/21: 10.6  10/23: 10.3    Albumin:  10/14: 4.3  10/16: 4.3  10/17: 4.1  10/19: 4.2  10/21: 4.5  10/23: 4.3      Mental Status Exam:     Oriented to:  Oriented to to day. Not oriented to location or situation.   General:  Awake and Confused  Appearance:  appears stated age, in hospital scrubs  Behavior/Attitude:  easily distracted and suspicious  Eye Contact:  appropriate  Psychomotor: no evidence of tics, dystonia, or tardive dyskinesia, agitated, and restless no catatonia present  Speech:  appropriate volume/tone  Language: Speaks nonsensically   Mood:  \"good\"   Affect:  congruent with mood (smiling intermittently)  Thought Process:  ruminative, looseness of association, thought blocking, disorganized, and confused  Thought Content:  did not assess SI/HI/AH/VH; delusions of paranoia "   Associations:  loose  Insight:  impaired   Judgment:  impaired   Impulse control: decreased  Attention Span:  decreased  Concentration: Distractible  Recent and Remote Memory:  not formally assessed  Fund of Knowledge:  Not assessed  Muscle Strength and Tone: normal  Gait and Station: Normal     Psychiatric Assessment     Estevan Aaron is a 64 year old male with previous psychiatric diagnoses of GEORGINA admitted from the ER on 10/12/2024 due to concern for HI and psychosis in the context of medical issues (hyperthyroidism, hypercalcemia), psychosocial stressors including with recent divorce and selling his home. This is the patient's first psychiatric hospitalization. Significant symptoms on admission included delusions of persecution with grandiose beliefs, homicidal ideations, as well as disorganized thinking and behavior. The MSE on admission was pertinent for a thought process which was perseverative, circumstantial, tangential, disorganized and tangential; with rambling and looseness of associations,. Psychological contributions to presentation included lack of insight. Social factors contributing to presentation include isolation, recent divorce, selling his house, and moving from hotel to hotel. Biological contributions to presentation included a history of hyperparathyroidism with chronic hypercalcemia per charts as well as a history of methamphetamine use per collateral from ex-wife.     History was difficult to obtain due to the patient's severe disorganized thinking on interview; he was observed with 1) persecutory delusions pertaining to the realtor and gang members, 2) disorganized behavior as these delusions have led him to flee to different hotels to stay safe/ has called  complaining of being targeted and 3) auditory hallucinations of voices for the past 12 months. Per collateral from ex-wife, Santos has had paranoid ideations since they first met. He has always felt like people are out to get him or  trying to rip him off. She says that he has had visual hallucinations (he will point things out that the rest of the family can't see) for a long time, but the family would just go along with him to avoid making him angry. Things began to worsen around the beginning of the COVID pandemic, when Santos became more isolated and the family started to notice cognitive issues such as impaired memory. Immediately prior to this hospitalization, the ex-wife found Santos in a hotel room with a generator full of gasoline, binoculars, and hunting equipment. This time course does not suggest acute psychosis, however given the patient has never had a full psychiatric work-up, it is reasonable to obtain a first-episode psychosis work-up.     Other things that could be contributing to his presentation is hyperparathyroidism with hypercalcemia. However, patient's calcium returned to normal limits on 10/16, and patient continues to have psychosis.    Patient also continues to be disoriented and his behaviors appear to worsen in the evenings. This raises concern for underlying neurocognitive disorder with delirium. Patient received MRI brain with and without contrast which showed frontal and parietal atrophy greater than would be expected for patient's age. This is consistent with neurocognitive disorder. Therefore, will attempt to lower patient's antipsychotic and see if he begins to improve. Patient will likely require long-term memory care.     Given that he currently has psychosis, patient warrants inpatient psychiatric hospitalization to maintain his safety.     Psychiatric Plan by Diagnosis      Today's changes:  - Morning Zyprexa decreased to 2.5 mg   - Evening Zyprexa decreased to 15 mg      # Bipolar I Disorder, current manic episode with psychotic features    1. Medications:  - Discontinued PTA fluoxetine 40 mg, consider restarting at a later time   - Olanzapine 2.5 mg during day and 15 mg at bedtime     2. Pertinent  Labs/Monitoring:   - See above     3. Additional Plans:  - Patient will be treated in therapeutic milieu with appropriate individual and group therapies as described     # Unspecified anxiety vs Generalized Anxiety Disorder  - Monitor for symptoms.  - Fluoxetine held due to suspicion of ongoing manic symptoms.     Psychiatric Hospital Course:      Estevan Aaron was admitted to [facility] on a 72 hour hold.   Medications:  PTA fluoxetine was held due to concern for worsening of zelalem   New medications started at the time of admission include Zyprexa.   Increased olanzapine 10 mg at bedtime was to 10 mg BID (10/14)   Increased olanzapine 10 mg BID to 10 mg during day and 15 mg at bedtime (10/15)  10/21: increased Zyprexa pm dose from 15 to 20 mg  10/23: started melatonin 3 mg at 7 pm to help patient with circadian rhythm as he has been staying up throughout the night and sleeping a lot during the day and there is some concern for delirium   10/24: consulted anesthesia for MRI brain   10/28: MRI brain complete, decrease AM zyprexa from 10 to 5 mg  10/29:  Morning Zyprexa decreased to 2.5 mg, evening Zyprexa decreased to 15 mg     Care conference 10/18/24 with daughter Maria C and ex-wife Clifford  - Report that patient has always been paranoid since ex-wife first met him. She describes him always feeling like he is getting ripped off.   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 They reported once incidence in which he made a bid on a job and the client paid him, but he never ended up finishing the  job  - Wife and him  officially this year, and since selling the house several months ago, patient has been moving from hotel to hotel due to thinking people are out to get him   - When they were selling their house, patient threatened realtors and felt he was being ripped off   - Clifford reports that she started to be afraid to be around him alone  - When he was found in the hotel, he had a generator full of gasoline, binoculars, bullets, and hunting equipment.    10/21/24: updated daughter on Santos's treatment plan     10/23/24: updated daughter on Santos's treatment plan     10/25/24: updated daughter on Santos's treatment plan, including plan to try and get MRI brain done under sedation. She said that Santos's grandfather had dementia and his sister has a brain tumor.     10/28/24: updated daughter on Santos's MRI results. She is planning on coming into town soon.     The risks, benefits, alternatives, and side effects were discussed and understood by the patient and other caregivers.     Medical Assessment and Plan     Medical diagnoses to be addressed this admission:    # Hypertension  - Continue PTA medications  Furosemide 40 mg daily  Lisinopril 40 mg daily  Metoprolol 50 mg daily  Amlodipine 10 mg daily  Clonidine 0.1 mg BID     # Hyperlipidemia  - Continue PTA Atorvastatin 40 mg     # Hyperparathyroidism  # Hypercalcemia, hypophosphoremia   Increased Ca level to 10.9 and decreased Ph level to 2.3 in the ED. Suspicion patient's hypercalcemia could be contributing to symptoms of psychosis.  - Consulted endocrinology, who started cinacalcet 30 mg BID on 10/13  - 10/16 endocrinology recommended continuing to trend calcium and albumin to make sure patient does not become hypocalcemic and recommended holding cinacalcet   - 10/17, calcium and albumin wnl, endo recommended cinacalcet 30 mg once daily   - 10/21, per endo continue cincaclcet and recheck calcium and albumin on October 24  - 10/23: per endo, patient needs  to follow up outpatient for further management of hyperparathyroidism      Medical course: Patient was physically examined by the ED prior to being transferred to the unit and was found to be medically stable and appropriate for admission.      Consults: Psychiatry, Endocrinology (follow-up of hyperthyroidism / hypercalcemia and hypertension)     Checklist     Legal Status: Court hold     Safety Assessment:   Behavioral Orders   Procedures    Assault precautions    Code 1 - Restrict to Unit    Elopement precautions    Routine Programming     As clinically indicated    Status 15     Every 15 minutes.    Status Individual Observation     1:1 during day shift, 2:1 during evening and night shift     Patient SIO status reviewed with team/RN.  Please also refer to RN/team documentation for add'l detail.    -SIO staff to monitor following which have contributed to patient being on SIO:  Pt has psychosis regarding being followed and attacked, very paranoid, HI    -Possible interventions SIO staff could use to support patient's treatment progress:  Redirect and reassure  -When following observed, team will review discontinuation of SIO:  Psychosis improves     Order Specific Question:   CONTINUOUS 24 hours / day     Answer:   Other     Order Specific Question:   Specify distance     Answer:   10 feet, 5 feet when close to the doors     Order Specific Question:   Indications for SIO     Answer:   Assault risk     Order Specific Question:   Indications for SIO     Answer:   Severe intrusiveness     Risk Assessment:  Risk for harm is elevated.  Risk factors: impulsive and past behaviors  Protective factors: family     Disposition: Pending stabilization, medication optimization, & development of a safe discharge plan.      Attestations     This patient was seen and discussed with my attending physician.  Presley Barrera MD  PGY-1 Psychiatry Resident    Attestation:  This patient has been seen and evaluated by me, Pete Smith,  MD.  I have discussed this patient with the house staff team including the resident and/or medical student and I agree with the findings and plan in this note.    I have reviewed today's vital signs, medications, labs and imaging. Pete Smith MD , PhD.

## 2024-10-30 NOTE — PLAN OF CARE
BEH IP Unit Acuity Rating Score (UARS)  Patient is given one point for every criteria they meet.    CRITERIA SCORING   On a 72 hour hold, court hold, committed, stay of commitment, or revocation. 1    Patient LOS on BEH unit exceeds 20 days. 0  LOS: 18   Patient under guardianship, 55+, otherwise medically complex, or under age 11. 1   Suicide ideation without relief of precipitating factors. 0   Current plan for suicide. 0   Current plan for homicide. 0   Imminent risk or actual attempt to seriously harm another without relief of factors precipitating the attempt. 1   Severe dysfunction in daily living (ex: complete neglect for self care, extreme disruption in vegetative function, extreme deterioration in social interactions). 1   Recent (last 7 days) or current physical aggression in the ED or on unit. 0   Restraints or seclusion episode in past 72 hours. 0   Recent (last 7 days) or current verbal aggression, agitation, yelling, etc., while in the ED or unit. 1 - last 10/26   Active psychosis. 1   Need for constant or near constant redirection (from leaving, from others, etc).  1   Intrusive or disruptive behaviors. 1   Patient requires 3 or more hours of individualized nursing care per 8-hour shift (i.e. for ADLs, meds, therapeutic interventions). 1   TOTAL 9

## 2024-10-30 NOTE — PLAN OF CARE
Problem: Psychotic Signs/Symptoms  Goal: Improved Behavioral Control (Psychotic Signs/Symptoms)  Outcome: Progressing  Intervention: Manage Behavior  Recent Flowsheet Documentation  Taken 10/30/2024 8133 by Tamie Fraser RN  De-Escalation Techniques: verbally redirected     Problem: Anxiety  Goal: Anxiety Reduction or Resolution  Outcome: Progressing   Goal Outcome Evaluation:    Pt was visible in the in the milieu watching TV with peers. He was up pacing the hallway talking to staff. Speech  is rambling and confused. Pt was observed on the phone multiple times  making phone calls. Pt was dismissive with mental health assessments. He was pleasant and flirtatious on approach. Pt nutrition and hydration is adequate. Pt has lab orders for Covid swap but declined when writer approach him for the swab.Pt was compliant with medication. No safety concerns. Pt continuous on SIO 1:!.for days and 2:1 on evening shift  and NOC for severe intrusiveness and assault Risk. Pt  got irritated slamped the door because he does not want his door left open at night. Notified the resident he gave new orders for ok to do 15 minute checks through the window blind.

## 2024-10-30 NOTE — PLAN OF CARE
Team Note Due:  Monday    Assessment/Intervention/Current Symtoms and Care Coordination:  Chart review and met with team, discussed pt progress, symptomology, and response to treatment.  Discussed the discharge plan and any potential impediments to discharge.    Pt's ex-wife reached out requesting a care conference this week. Confirmed with providers Friday 11/1 at 12:30pm works. Clifford confirmed this works for family as well. Discussed coordinating with financial counselors as well to try and complete MA application with pt on Friday while family is here to hopefully assist with the application.    Message sent to financial counselors regarding applying for MA. Writer was notified pt also has Medicare and confirmed he will still need to apply for MA due to likely discharge plan to memory care facility.    [place] Hearing scheduled for 9:30am tomorrow.    Discharge Plan or Goal:  Likely memory care facility     Barriers to Discharge:  Patient requires further psychiatric stabilization due to current symptomology, medication management with changes subject to provider, coordination with outside supports, and aftercare planning. Pt is under civil commitment.     Referral Status:  None at this time     Legal Status:  MI Commitment (Ortega pending - hearing 10/31)   [facility]  File Number: [id number]  Start and expiration date of commitment: 10/24/24 - 04/24/25    [place]ga meds requested: Haldol, Prolixin, Clozaril, Risperdal, Invega, Zyprexa, Seroquel, Abilify    Future Court Hearings:  [place]ga Hearing: Thursday 10/31 at 0930    PPS:  Shelby Chowdary: [phone]  [email or web address].    Contacts:  Maria C Agnieszka (Daughter): [phone]   Clifford Agnieszka (ex-wife): [phone]      Upcoming Meetings and Dates/Important Information and next steps:  Follow commitment process  Ortega hearing 10/31  Family conference Friday 11/1 at 12:30pm  Discharge planning when appropriate  If pt needs Memory Care and  needs funding assistance, pt will need to apply for MA with financial counselors before a MNChoices Assessment/SMRT can be requested for waivers.    Provisional Discharge and Change of Status needed at discharge.

## 2024-10-30 NOTE — PROGRESS NOTES
"  Rehab Group    Start time: 1625  End time: 1730  Patient time total: 35 minutes    attended partial group    #6 attended   Group Type: dance movement   Group Topic Covered: coping skills, relationship skills/support systems, and experiencing safety through emotional connection       Group Session Detail:  Group theme revolved around bound vs flow states of safety and relational connection.  Creative self-expression was increasingly fluid as it was affirmed.  Self-determination within the context of social consideration was danced and verbalized.  This simultaneous \"loosening\" of self-protective or defensive postures subsided throughout the group as participants moved through environmental/unit-based factors and were able to focus on their own needs.  While this was supported (by both therapist, unit staff and peers) self-expression also increased, therefore, more complex expressions emerged toward the end of the session.     Patient Response/Contribution:  positive affect, supportive of peers, actively engaged, and appeared confused        Patient Detail:    Pt presented as disheveled or lacking understanding but swung his arms from a seated position and smiled appropriately at group interactions.  He appeared to enjoy his own movements as well as those of his peers and engaged himself fully.  He left the session a bit early though appeared to have completed his process for the session.  He left a card with important phone numbers so when this therapist returned that information to him, his reaction was socially appropriate as he recognized this was important information he had misplaced.      Group Therapy [address]210      Patient Active Problem List   Diagnosis    Hyperlipidemia LDL goal <130    Hypertension goal BP (blood pressure) < 130/80    Hypercalcemia    Hyperparathyroidism (H)    Thalassemia, unspecified type    Psychosis, unspecified psychosis type (H)    Acute psychosis (H)      "

## 2024-10-30 NOTE — PLAN OF CARE
Problem: Adult Behavioral Health Plan of Care  Goal: Adheres to Safety Considerations for Self and Others  Outcome: Progressing  Intervention: Develop and Maintain Individualized Safety Plan  Recent Flowsheet Documentation  Taken 10/30/2024 1300 by Rocío Curry RN  Safety Measures:   environmental rounds completed   safety rounds completed     Problem: Anxiety  Goal: Anxiety Reduction or Resolution  Outcome: Progressing  Intervention: Promote Anxiety Reduction  Recent Flowsheet Documentation  Taken 10/30/2024 1300 by Rocío Curry RN  Supportive Measures:   verbalization of feelings encouraged   positive reinforcement provided   active listening utilized  Family/Support System Care:   involvement promoted   self-care encouraged   support provided     Problem: Sleep Disturbance  Goal: Adequate Sleep/Rest  Outcome: Progressing   Goal Outcome Evaluation:    Plan of Care Reviewed With: patient      Pt is visible in the milieu. He mostly stayed in the lounge area snoozing at times. At other times he watches TV. He comes out for meals. He is medication compliant. Pt nutrition and hydration is adequate. Pt is pleasant and cooperative. Pt is observed to be on the phone a couple of times. His speech is relevant. At one time though,  he talked about plumbing with his SIO staff.  No elopement behaviors this shift. Pt appears sleepy mostly this shift. PT BP was elevated in the morning, managed with scheduled medications. Last BP is 101/65 P 53.    Later in the afternoon, pt adamantly asked for his wallet and all the items in it.  Explained to him that it is his locker , pt was angry about it. SIO intervened and redirected him.      Pt  SIO 1:1 modified for 1:1 on days and evenings, and 2:1 for NOC shift for severe intrusiveness.

## 2024-10-30 NOTE — PLAN OF CARE
Problem: Psychotic Signs/Symptoms  Goal: Improved Behavioral Control (Psychotic Signs/Symptoms)  Outcome: Progressing  Intervention: Manage Behavior  Recent Flowsheet Documentation  Taken 10/29/2024 1900 by Pool Urbina RN  De-Escalation Techniques: verbally redirected     Problem: Anxiety  Goal: Anxiety Reduction or Resolution  Outcome: Progressing  Intervention: Promote Anxiety Reduction  Recent Flowsheet Documentation  Taken 10/29/2024 1900 by Pool Urbina RN  Supportive Measures:   verbalization of feelings encouraged   positive reinforcement provided   active listening utilized  Family/Support System Care:   involvement promoted   presence promoted   self-care encouraged   support provided   Goal Outcome Evaluation:    Plan of Care Reviewed With: patient      Patient mentation is improving. He was less confused and more constructive and logical with his sentences. On multiple occasions he was able to identify were he was and the staff around him. He also, identified his daughter and his x-wife when they visited him.His insight to his situations and environment is improving. He however, continue to be intrusive and remains on SIO as a result. He initially declined his bedtime medications, but eventually took them after multiple attempts.

## 2024-10-30 NOTE — PLAN OF CARE
Problem: Sleep Disturbance  Goal: Adequate Sleep/Rest  Outcome: Progressing   Goal Outcome Evaluation:  Patient appears to have slept for 5.5 hours. Intermittently awake during the shift, snacks offered while awake. Patient thought process is disorganized, required verbal redirections as on one occasion he picked up the PA phone at the . Patient was receptive to redirections.  No complaints of pain. No requested or administered prn's. Continues on SIO 2:1 for overnights.

## 2024-10-31 LAB — SARS-COV-2 RNA RESP QL NAA+PROBE: NEGATIVE

## 2024-10-31 PROCEDURE — 250N000013 HC RX MED GY IP 250 OP 250 PS 637

## 2024-10-31 PROCEDURE — 124N000002 HC R&B MH UMMC

## 2024-10-31 PROCEDURE — 87635 SARS-COV-2 COVID-19 AMP PRB: CPT

## 2024-10-31 PROCEDURE — 97150 GROUP THERAPEUTIC PROCEDURES: CPT | Mod: GO

## 2024-10-31 PROCEDURE — 99231 SBSQ HOSP IP/OBS SF/LOW 25: CPT | Mod: GC | Performed by: PSYCHIATRY & NEUROLOGY

## 2024-10-31 RX ADMIN — LISINOPRIL 40 MG: 10 TABLET ORAL at 09:10

## 2024-10-31 RX ADMIN — OLANZAPINE 2.5 MG: 5 TABLET, ORALLY DISINTEGRATING ORAL at 09:09

## 2024-10-31 RX ADMIN — CLONIDINE HYDROCHLORIDE 0.1 MG: 0.1 TABLET ORAL at 20:30

## 2024-10-31 RX ADMIN — Medication 1 PATCH: at 09:10

## 2024-10-31 RX ADMIN — CLONIDINE HYDROCHLORIDE 0.1 MG: 0.1 TABLET ORAL at 09:09

## 2024-10-31 RX ADMIN — FUROSEMIDE 40 MG: 40 TABLET ORAL at 09:09

## 2024-10-31 RX ADMIN — CINACALCET 30 MG: 30 TABLET ORAL at 09:09

## 2024-10-31 RX ADMIN — ATORVASTATIN CALCIUM 40 MG: 20 TABLET, FILM COATED ORAL at 09:09

## 2024-10-31 RX ADMIN — AMLODIPINE BESYLATE 10 MG: 5 TABLET ORAL at 09:10

## 2024-10-31 RX ADMIN — OLANZAPINE 15 MG: 15 TABLET, ORALLY DISINTEGRATING ORAL at 20:31

## 2024-10-31 RX ADMIN — METOPROLOL SUCCINATE 50 MG: 50 TABLET, EXTENDED RELEASE ORAL at 09:10

## 2024-10-31 RX ADMIN — Medication 3 MG: at 20:30

## 2024-10-31 NOTE — CARE PLAN
Rehab Group    Start time: 1015  End time: 1400  Patient time total: 45 minutes  *group split by lunch hour    attended partial group    #7 attended   Group Type: OT Clinic   Group Topic Covered: activity therapy and coping skills     Group Session Detail:  Coping skill exploration, creative expression within personally meaningful activities, and observation of social, cognitive, and kinesthetic performance skills     Patient Response/Contribution:  positive affect, cooperative with task, and actively engaged     Patient Detail:  Presented with a polite and friendly demeanor. SIO present. Expressed desire to initiate a self-directed project that he observed peers participating in; a novel stained-glass painting task. Required min assist to gather materials, organize work space, and sequence task. Although Pt needed additional directions with modeling, he demonstrated fair problem solving and organization within follow-through. Engaged in appropriate conversation with peers upon approach. Plans to complete project next session.         [id number] OT Group (2 or more in attendance)      Patient Active Problem List   Diagnosis    Hyperlipidemia LDL goal <130    Hypertension goal BP (blood pressure) < 130/80    Hypercalcemia    Hyperparathyroidism (H)    Thalassemia, unspecified type    Psychosis, unspecified psychosis type (H)    Acute psychosis (H)

## 2024-10-31 NOTE — PLAN OF CARE
Problem: Sleep Disturbance  Goal: Adequate Sleep/Rest  Outcome: Progressing  Patient is being monitored per Status Individual Observation due to severe intrusiveness , assault risk and elopement risk. Number of staff required to monitor 2. Staff to patient distance 10 feet, 5 feet when close to the doors. Patient appears sleeping most of the shift. No complaints of pain and discomfort. Patient continues on q15 minutes safety checks,The patient had 6.25 total hours of sleep this shift.  Will continue to monitor the patient and provide therapeutic intervention as needed. Will continue with current plan of care. Notify MD with any concerns.

## 2024-10-31 NOTE — PLAN OF CARE
Team Note Due:  Monday    Assessment/Intervention/Current Symtoms and Care Coordination:  Chart review and met with team, discussed pt progress, symptomology, and response to treatment.  Discussed the discharge plan and any potential impediments to discharge.    Pt attended [facility] Hearing 9:30am.    Received update from financial counselor. She will call pt tomorrow afternoon while family is on the unit to assist with an MA application.    Consulted with PPS regarding plan for conference tomorrow, which she intends to join either in person or virtually. She stated she will make a [place] assessment referral as the court order will allow her to do so without pt's consent (pt has not consented as he's not understanding the reasoning/need for M[place]hoice assessment due to ongoing confusion).     [employer] order received via fax at 12:57pm. MD updated. In reviewing [facility] order, Prolixin was requested by MD when petitio was filed but this medication was not included in the order. Per MD, the medications included in the order are sufficient, so it does not appear necessary to contact the 's office to request regarding this.    Copy of order placed in pt's paper chart. Copy given to pt.    Discharge Plan or Goal:  Likely memory care facility     Barriers to Discharge:  Patient requires further psychiatric stabilization due to current symptomology, medication management with changes subject to provider, coordination with outside supports, and aftercare planning. Pt is under civil commitment.     Referral Status:  None at this time     Legal Status:  MI Commitment with [facility]  File Number: [id number]  Start and expiration date of commitment: 10/24/24 - 04/24/25    [employer] meds: Haldol, Clozaril, Risperdal, Invega, Zyprexa, Seroquel, Abilify    PPS:  Shelby Chowdary: [phone]  [email or web address]    Contacts:  Maria C Agnieszka (Daughter): [phone]   Clifford Agnieszka (ex-wife):  [phone]      Upcoming Meetings and Dates/Important Information and next steps:  Family conference Friday 11/1 at 12:30pm  Discharge planning when appropriate  If pt needs Memory Care and needs funding assistance, pt will need to apply for MA with financial counselors before a [facility] Assessment/SMRT can be requested for waivers.    Provisional Discharge and Change of Status needed at discharge

## 2024-10-31 NOTE — PROGRESS NOTES
"  ----------------------------------------------------------------------------------------------------------  [facility]  Psychiatry Progress Note  Hospital Day #19     Interim History:     The patient's care was discussed with the treatment team and chart notes were reviewed.    Vitals: VSS, intermittently bradycardic to the 50s   Sleep: 6.25 hours (10/31/24 0600)  Scheduled medications: Took all scheduled medications as prescribed   Psychiatric PRNs: None    Staff Report:   -Patient sleeping in the lounge for a lot of day shift, later became irritable that he cannot have his wallet  -Speech is rambling and confused  -Patient irritable because he wants his door shut at night     Please see staff notes for details      Subjective:     Patient Interview:   Estevan \"Santos\" Page was interviewed in his room. Reports that \"I am not doing too bad, and I'm ready to go home.\" Tells the team that all of his daughters are coming to town. Says his mood is \"good.\"     Writer explained that we have been decreasing his medications and explained that most of the medications that he is currently taking are for his blood pressure. Discussed with patient that we will continue to decrease his medications tomorrow. No oriented to the day, city, or building. He says \"I do not need all of this anxiety mixed in with the narcotics.\"     ROS:  Negative except for above.      Objective:     Vitals:  /64   Pulse 58   Temp 97.7  F (36.5  C) (Oral)   Resp 16   Wt 104.2 kg (229 lb 11.2 oz)   SpO2 95%   BMI 37.07 kg/m      Allergies:  No Known Allergies    Current Medications:  Scheduled:  Current Facility-Administered Medications   Medication Dose Route Frequency Provider Last Rate Last Admin    acetaminophen (TYLENOL) tablet 650 mg  650 mg Oral Q4H PRN Nikki Caceres MD   650 mg at 10/17/24 0837    alum & mag hydroxide-simethicone (MAALOX) suspension 30 mL  30 mL Oral Q4H PRN Nicki, " MD Nikki   30 mL at 10/17/24 0837    amLODIPine (NORVASC) tablet 10 mg  10 mg Oral Daily Presley Barrera MD   10 mg at 10/30/24 0833    atorvastatin (LIPITOR) tablet 40 mg  40 mg Oral Daily Nikki Caceres MD   40 mg at 10/30/24 0833    cholecalciferol (VITAMIN D3) capsule 1,250 mcg  1,250 mcg Oral Q7 Days Evelia Grimm DO   1,250 mcg at 10/22/24 1124    cinacalcet (SENSIPAR) tablet 30 mg  30 mg Oral Daily Presley Barrera MD   30 mg at 10/30/24 0833    cloNIDine (CATAPRES) tablet 0.1 mg  0.1 mg Oral BID Presley Barrera MD   0.1 mg at 10/30/24 1926    furosemide (LASIX) tablet 40 mg  40 mg Oral Daily Presley Barrera MD   40 mg at 10/30/24 0833    gabapentin (NEURONTIN) capsule 100 mg  100 mg Oral Q6H PRN Nikki Caceres MD        lisinopril (ZESTRIL) tablet 40 mg  40 mg Oral Daily Presley Barrera MD   40 mg at 10/30/24 0833    melatonin tablet 3 mg  3 mg Oral At Bedtime Presley Barrera MD   3 mg at 10/30/24 1924    metoprolol succinate ER (TOPROL XL) 24 hr tablet 50 mg  50 mg Oral Daily Presley Barrera MD   50 mg at 10/30/24 0833    nicotine (NICODERM CQ) 14 MG/24HR 24 hr patch 1 patch  1 patch Transdermal Daily Evelia Grimm DO   1 patch at 10/30/24 0831    nicotine (NICODERM CQ) 21 MG/24HR 24 hr patch 1 patch  1 patch Transdermal Daily PRN Britt Kang MD   1 patch at 10/13/24 1611    nicotine (NICORETTE) gum 2 mg  2 mg Buccal Q1H PRN González Garcia MD   2 mg at 10/24/24 1254    OLANZapine (zyPREXA) tablet 5 mg  5 mg Oral TID PRN Evelia Grimm DO   5 mg at 10/24/24 1101    Or    OLANZapine (zyPREXA) injection 5 mg  5 mg Intramuscular TID PRN Evelia Grimm DO        OLANZapine zydis (zyPREXA) ODT half-tab 2.5 mg  2.5 mg Oral QAM Presley Barrera MD        OLANZapine zydis (zyPREXA) ODT tab 15 mg  15 mg Oral At Bedtime Presley Barrera MD   15 mg at 10/30/24 1925    polyethylene glycol (MIRALAX) Packet 17 g  17 g Oral Daily PRN Nikki Caceres MD        traZODone (DESYREL) half-tab 25 mg  25 mg Oral  At Bedtime PRN Evelia Grimm DO   25 mg at 10/22/24 2225    Or    traZODone (DESYREL) tablet 50 mg  50 mg Oral At Bedtime PRN Evelia Grimm DO         PRN:  Current Facility-Administered Medications   Medication Dose Route Frequency Provider Last Rate Last Admin    acetaminophen (TYLENOL) tablet 650 mg  650 mg Oral Q4H PRN Nikki Caceres MD   650 mg at 10/17/24 0837    alum & mag hydroxide-simethicone (MAALOX) suspension 30 mL  30 mL Oral Q4H PRN Nikki Caceres MD   30 mL at 10/17/24 0837    amLODIPine (NORVASC) tablet 10 mg  10 mg Oral Daily Presley Barrera MD   10 mg at 10/30/24 0833    atorvastatin (LIPITOR) tablet 40 mg  40 mg Oral Daily Nikki Caceres MD   40 mg at 10/30/24 0833    cholecalciferol (VITAMIN D3) capsule 1,250 mcg  1,250 mcg Oral Q7 Days Evelia Grimm DO   1,250 mcg at 10/22/24 1124    cinacalcet (SENSIPAR) tablet 30 mg  30 mg Oral Daily Presley Barrera MD   30 mg at 10/30/24 0833    cloNIDine (CATAPRES) tablet 0.1 mg  0.1 mg Oral BID Presley Barrera MD   0.1 mg at 10/30/24 1926    furosemide (LASIX) tablet 40 mg  40 mg Oral Daily Presley Barrera MD   40 mg at 10/30/24 0833    gabapentin (NEURONTIN) capsule 100 mg  100 mg Oral Q6H PRN Nikki Caceres MD        lisinopril (ZESTRIL) tablet 40 mg  40 mg Oral Daily Presley Barrera MD   40 mg at 10/30/24 0833    melatonin tablet 3 mg  3 mg Oral At Bedtime Presley Barrera MD   3 mg at 10/30/24 1924    metoprolol succinate ER (TOPROL XL) 24 hr tablet 50 mg  50 mg Oral Daily Presley Barrera MD   50 mg at 10/30/24 0833    nicotine (NICODERM CQ) 14 MG/24HR 24 hr patch 1 patch  1 patch Transdermal Daily Evelia Grimm DO   1 patch at 10/30/24 0831    nicotine (NICODERM CQ) 21 MG/24HR 24 hr patch 1 patch  1 patch Transdermal Daily PRN Britt Kang MD   1 patch at 10/13/24 1611    nicotine (NICORETTE) gum 2 mg  2 mg Buccal Q1H PRN González Garcia MD   2 mg at 10/24/24 1254    OLANZapine (zyPREXA) tablet 5 mg  5 mg Oral TID PRN Evelia Grimm, DO   " 5 mg at 10/24/24 1101    Or    OLANZapine (zyPREXA) injection 5 mg  5 mg Intramuscular TID PRN Evelia Grimm DO        OLANZapine zydis (zyPREXA) ODT half-tab 2.5 mg  2.5 mg Oral QAM Presley Barrera MD        OLANZapine zydis (zyPREXA) ODT tab 15 mg  15 mg Oral At Bedtime Presley Barrera MD   15 mg at 10/30/24 1925    polyethylene glycol (MIRALAX) Packet 17 g  17 g Oral Daily PRN Nikki Caceres MD        traZODone (DESYREL) half-tab 25 mg  25 mg Oral At Bedtime PRN Evelia Grimm DO   25 mg at 10/22/24 2225    Or    traZODone (DESYREL) tablet 50 mg  50 mg Oral At Bedtime PRN Evelia Grimm DO         Labs and Imaging:  Data this admission:  - CBC unremarkable  - CMP unremarkable  - TSH normal  - UDS negative  - Vit D low  - Hgb A1c 5.9 (10/13/24)  - Lipids unremarkable  - Vit B12 normal  - Folate normal  - Urinalysis unremarkable  - EKG normal sinus rhythm, QTc 390  - Head CT showed no acute changes  - HIV non reactive  - Treponema antibody non reactive  - ESR wnl   - Ceruloplasmin wnl   - BOOGIE negative  - Lyme negative     Parathyroid hormone:   10/13: 85    Calcium:  10/14: 11.4  10/16: 10.3  10/17: 10.3  10/19: 9.8  10/21: 10.6  10/23: 10.3    Albumin:  10/14: 4.3  10/16: 4.3  10/17: 4.1  10/19: 4.2  10/21: 4.5  10/23: 4.3      Mental Status Exam:     Oriented to:  Not oriented to date, location or situation.   General:  Awake and Confused  Appearance:  appears stated age, in hospital scrubs  Behavior/Attitude:  easily distracted and suspicious  Eye Contact:  appropriate  Psychomotor: no evidence of tics, dystonia, or tardive dyskinesia, agitated, and restless no catatonia present  Speech:  appropriate volume/tone  Language: Speaks nonsensically   Mood:  \"good\"   Affect:  congruent with mood (smiling intermittently)  Thought Process:  ruminative, looseness of association, thought blocking, disorganized, and confused  Thought Content:  did not assess SI/HI, appears to be having VH; delusions of paranoia "   Associations:  loose  Insight:  impaired   Judgment:  impaired   Impulse control: decreased  Attention Span:  decreased  Concentration: Distractible  Recent and Remote Memory:  not formally assessed  Fund of Knowledge:  Not assessed  Muscle Strength and Tone: normal  Gait and Station: Normal     Psychiatric Assessment     Estevan Aaron is a 64 year old male with previous psychiatric diagnoses of GEORGINA admitted from the ER on 10/12/2024 due to concern for HI and psychosis in the context of medical issues (hyperthyroidism, hypercalcemia), psychosocial stressors including with recent divorce and selling his home. This is the patient's first psychiatric hospitalization. Significant symptoms on admission included delusions of persecution with grandiose beliefs, homicidal ideations, as well as disorganized thinking and behavior. The MSE on admission was pertinent for a thought process which was perseverative, circumstantial, tangential, disorganized and tangential; with rambling and looseness of associations,. Psychological contributions to presentation included lack of insight. Social factors contributing to presentation include isolation, recent divorce, selling his house, and moving from hotel to hotel. Biological contributions to presentation included a history of hyperparathyroidism with chronic hypercalcemia per charts as well as a history of methamphetamine use per collateral from ex-wife.     History was difficult to obtain due to the patient's severe disorganized thinking on interview; he was observed with 1) persecutory delusions pertaining to the realtor and gang members, 2) disorganized behavior as these delusions have led him to flee to different hotels to stay safe/ has called  complaining of being targeted and 3) auditory hallucinations of voices for the past 12 months. Per collateral from ex-wife, Santos has had paranoid ideations since they first met. He has always felt like people are out to get him or  trying to rip him off. She says that he has had visual hallucinations (he will point things out that the rest of the family can't see) for a long time, but the family would just go along with him to avoid making him angry. Things began to worsen around the beginning of the COVID pandemic, when Santos became more isolated and the family started to notice cognitive issues such as impaired memory. Immediately prior to this hospitalization, the ex-wife found Santos in a hotel room with a generator full of gasoline, binoculars, and hunting equipment. This time course does not suggest acute psychosis, however given the patient has never had a full psychiatric work-up, it is reasonable to obtain a first-episode psychosis work-up.     Other things that could be contributing to his presentation is hyperparathyroidism with hypercalcemia. However, patient's calcium returned to normal limits on 10/16, and patient continues to have psychosis so likely not a significant contributing factor to patient's presentation.     Patient also continues to be disoriented and his behaviors appear to worsen in the evenings. This raises concern for underlying neurocognitive disorder with delirium. Patient received MRI brain with and without contrast which showed frontal and parietal atrophy greater than would be expected for patient's age. This is consistent with neurocognitive disorder. Therefore, will attempt to lower patient's antipsychotic and see if he begins to improve. Patient will likely require long-term memory care.     Given that he currently has psychosis, patient warrants inpatient psychiatric hospitalization to maintain his safety.     Psychiatric Plan by Diagnosis      Today's changes:  - None     # Bipolar I Disorder, current manic episode with psychotic features    1. Medications:  - Discontinued PTA fluoxetine 40 mg, consider restarting at a later time   - Olanzapine 2.5 mg during day and 15 mg at bedtime     2. Pertinent  Labs/Monitoring:   - See above     3. Additional Plans:  - Patient will be treated in therapeutic milieu with appropriate individual and group therapies as described     # Unspecified anxiety vs Generalized Anxiety Disorder  - Monitor for symptoms.  - Fluoxetine held due to suspicion of ongoing manic symptoms     Psychiatric Hospital Course:      Estevan Aaron was admitted to [facility] on a 72 hour hold.   Medications:  PTA fluoxetine was held due to concern for worsening of zelalem   New medications started at the time of admission include Zyprexa.   Increased olanzapine 10 mg at bedtime was to 10 mg BID (10/14)   Increased olanzapine 10 mg BID to 10 mg during day and 15 mg at bedtime (10/15)  10/21: increased Zyprexa pm dose from 15 to 20 mg  10/23: started melatonin 3 mg at 7 pm to help patient with circadian rhythm as he has been staying up throughout the night and sleeping a lot during the day and there is some concern for delirium   10/24: consulted anesthesia for MRI brain   10/28: MRI brain complete, decrease AM zyprexa from 10 to 5 mg  10/29: Morning Zyprexa decreased to 2.5 mg, evening Zyprexa decreased to 15 mg     Care conference 10/18/24 with daughter Maria C and ex-wife Clifford  - Report that patient has always been paranoid since ex-wife first met him. She describes him always feeling like he is getting ripped off.   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 They reported once incidence in which he made a bid on a job and the client paid him, but he never ended up finishing the  job  - Wife and him  officially this year, and since selling the house several months ago, patient has been moving from hotel to hotel due to thinking people are out to get him   - When they were selling their house, patient threatened realtors and felt he was being ripped off   - Clifford reports that she started to be afraid to be around him alone  - When he was found in the hotel, he had a generator full of gasoline, binoculars, bullets, and hunting equipment.    10/21/24: updated daughter on Santos's treatment plan     10/23/24: updated daughter on Santos's treatment plan     10/25/24: updated daughter on Santos's treatment plan, including plan to try and get MRI brain done under sedation. She said that Santos's grandfather had dementia and his sister has a brain tumor.     10/28/24: updated daughter on Santos's MRI results. She is planning on coming into town soon.     The risks, benefits, alternatives, and side effects were discussed and understood by the patient and other caregivers.     Medical Assessment and Plan     Medical diagnoses to be addressed this admission:    # Hypertension  - Continue PTA medications  Furosemide 40 mg daily  Lisinopril 40 mg daily  Metoprolol 50 mg daily  Amlodipine 10 mg daily  Clonidine 0.1 mg BID     # Hyperlipidemia  - Continue PTA Atorvastatin 40 mg     # Hyperparathyroidism  # Hypercalcemia, hypophosphoremia   Increased Ca level to 10.9 and decreased Ph level to 2.3 in the ED. Suspicion patient's hypercalcemia could be contributing to symptoms of psychosis.  - Consulted endocrinology, who started cinacalcet 30 mg BID on 10/13  - 10/16 endocrinology recommended continuing to trend calcium and albumin to make sure patient does not become hypocalcemic and recommended holding cinacalcet   - 10/17, calcium and albumin wnl, endo recommended cinacalcet 30 mg once daily   - 10/21, per endo continue cincaclcet and recheck calcium and albumin on October 24  - 10/23: per endo, patient needs  to follow up outpatient for further management of hyperparathyroidism      Medical course: Patient was physically examined by the ED prior to being transferred to the unit and was found to be medically stable and appropriate for admission.      Consults: Psychiatry, Endocrinology (follow-up of hyperthyroidism / hypercalcemia and hypertension)     Checklist     Legal Status: Committed     Safety Assessment:   Behavioral Orders   Procedures    Assault precautions    Code 1 - Restrict to Unit    Elopement precautions    Routine Programming     As clinically indicated    Status 15     Every 15 minutes.    Status Individual Observation     1:1 during day shift and evening shift, 2:1 during night shift  If patient refuses overnight presence of staff in his room, it's ok to do 15 min checks through the window blinds.    Patient SIO status reviewed with team/RN.  Please also refer to RN/team documentation for add'l detail.    -SIO staff to monitor following which have contributed to patient being on SIO:  Pt has psychosis regarding being followed and attacked, very paranoid, HI    -Possible interventions SIO staff could use to support patient's treatment progress:  Redirect and reassure  -When following observed, team will review discontinuation of SIO:  Psychosis improves     Order Specific Question:   CONTINUOUS 24 hours / day     Answer:   Other     Order Specific Question:   Specify distance     Answer:   10 feet, 5 feet when close to the doors     Order Specific Question:   Indications for SIO     Answer:   Assault risk     Order Specific Question:   Indications for SIO     Answer:   Severe intrusiveness     Risk Assessment:  Risk for harm is elevated.  Risk factors: impulsive and past behaviors  Protective factors: family     Disposition: Pending stabilization, medication optimization, & development of a safe discharge plan.      Attestations     This patient was seen and discussed with my attending physician.  Presley  MD Bruce  PGY-1 Psychiatry Resident    Attestation:  This patient has been seen and evaluated by me, Pete Smith MD.  I have discussed this patient with the house staff team including the resident and/or medical student and I agree with the findings and plan in this note.    I have reviewed today's vital signs, medications, labs and imaging. Pete Smith MD , PhD.

## 2024-10-31 NOTE — PLAN OF CARE
BEH IP Unit Acuity Rating Score (UARS)  Patient is given one point for every criteria they meet.    CRITERIA SCORING   On a 72 hour hold, court hold, committed, stay of commitment, or revocation. 1    Patient LOS on BEH unit exceeds 20 days. 0  LOS: 19   Patient under guardianship, 55+, otherwise medically complex, or under age 11. 1   Suicide ideation without relief of precipitating factors. 0   Current plan for suicide. 0   Current plan for homicide. 0   Imminent risk or actual attempt to seriously harm another without relief of factors precipitating the attempt. 1   Severe dysfunction in daily living (ex: complete neglect for self care, extreme disruption in vegetative function, extreme deterioration in social interactions). 1   Recent (last 7 days) or current physical aggression in the ED or on unit. 0   Restraints or seclusion episode in past 72 hours. 0   Recent (last 7 days) or current verbal aggression, agitation, yelling, etc., while in the ED or unit. 1 - last 10/30   Active psychosis. 1   Need for constant or near constant redirection (from leaving, from others, etc).  1   Intrusive or disruptive behaviors. 1   Patient requires 3 or more hours of individualized nursing care per 8-hour shift (i.e. for ADLs, meds, therapeutic interventions). 1   TOTAL 9

## 2024-11-01 LAB
ARSENIC BLD-MCNC: <10 UG/L
LEAD BLDV-MCNC: <2 UG/DL
MERCURY BLD-MCNC: <2.5 UG/L
VIT B1 PYROPHOSHATE BLD-SCNC: 126 NMOL/L

## 2024-11-01 PROCEDURE — 250N000013 HC RX MED GY IP 250 OP 250 PS 637

## 2024-11-01 PROCEDURE — 124N000002 HC R&B MH UMMC

## 2024-11-01 PROCEDURE — 99232 SBSQ HOSP IP/OBS MODERATE 35: CPT | Mod: GC | Performed by: PSYCHIATRY & NEUROLOGY

## 2024-11-01 RX ORDER — OLANZAPINE 10 MG/1
10 TABLET, ORALLY DISINTEGRATING ORAL AT BEDTIME
Status: DISCONTINUED | OUTPATIENT
Start: 2024-11-01 | End: 2024-11-04

## 2024-11-01 RX ADMIN — Medication 3 MG: at 20:03

## 2024-11-01 RX ADMIN — CLONIDINE HYDROCHLORIDE 0.1 MG: 0.1 TABLET ORAL at 20:03

## 2024-11-01 RX ADMIN — OLANZAPINE 2.5 MG: 5 TABLET, ORALLY DISINTEGRATING ORAL at 08:50

## 2024-11-01 RX ADMIN — ATORVASTATIN CALCIUM 40 MG: 20 TABLET, FILM COATED ORAL at 08:50

## 2024-11-01 RX ADMIN — Medication 1 PATCH: at 08:55

## 2024-11-01 RX ADMIN — LISINOPRIL 40 MG: 10 TABLET ORAL at 08:50

## 2024-11-01 RX ADMIN — METOPROLOL SUCCINATE 50 MG: 50 TABLET, EXTENDED RELEASE ORAL at 08:50

## 2024-11-01 RX ADMIN — CLONIDINE HYDROCHLORIDE 0.1 MG: 0.1 TABLET ORAL at 08:50

## 2024-11-01 RX ADMIN — CINACALCET 30 MG: 30 TABLET ORAL at 08:50

## 2024-11-01 RX ADMIN — AMLODIPINE BESYLATE 10 MG: 5 TABLET ORAL at 08:49

## 2024-11-01 RX ADMIN — FUROSEMIDE 40 MG: 40 TABLET ORAL at 08:50

## 2024-11-01 RX ADMIN — OLANZAPINE 10 MG: 10 TABLET, ORALLY DISINTEGRATING ORAL at 20:03

## 2024-11-01 ASSESSMENT — ACTIVITIES OF DAILY LIVING (ADL)
ADLS_ACUITY_SCORE: 0
ORAL_HYGIENE: INDEPENDENT
DRESS: INDEPENDENT
ADLS_ACUITY_SCORE: 0
HYGIENE/GROOMING: INDEPENDENT
ADLS_ACUITY_SCORE: 0

## 2024-11-01 NOTE — PROGRESS NOTES
"  ----------------------------------------------------------------------------------------------------------  [facility]  Psychiatry Progress Note  Hospital Day #20     Interim History:     The patient's care was discussed with the treatment team and chart notes were reviewed.    Vitals: VSS, intermittently bradycardic to the 50s   Sleep: 5.5 hours (11/01/24 0600)  Scheduled medications: Took all scheduled medications as prescribed   Psychiatric PRNs: None    Staff Report:   - No significant behavioral issues  - Remains disorganized, but not trying to elope   - Fell off of his bed this am ~6:45, neuro check normal, vital signs normal     Please see staff notes for details      Subjective:     Patient Interview:   Estevan \"Santos\" Agnieszka was interviewed in the milieu. He was sleeping on a longue chair when approached by the team.     Writer asked patient about his fall yesterday. He reports that he fell off the side of his bed while he was sleeping and hit his head. Writer examined his head, no obvious signs of concern.     Tells the team that his \"900 came through\". Not oriented to the city. Oriented to the date. Writer informed the patient that we will continue to decrease his medications.     ROS:  Negative except for above.      Objective:     Vitals:  /81   Pulse 80   Temp 98.2  F (36.8  C) (Oral)   Resp 17   Wt 104.3 kg (230 lb)   SpO2 98%   BMI 37.12 kg/m      Allergies:  No Known Allergies    Current Medications:  Scheduled:  Current Facility-Administered Medications   Medication Dose Route Frequency Provider Last Rate Last Admin    acetaminophen (TYLENOL) tablet 650 mg  650 mg Oral Q4H PRN Nikki Caceres MD   650 mg at 10/17/24 0837    alum & mag hydroxide-simethicone (MAALOX) suspension 30 mL  30 mL Oral Q4H PRN Nikki Caceres MD   30 mL at 10/17/24 0837    amLODIPine (NORVASC) tablet 10 mg  10 mg Oral Daily Presley Barrera MD   10 mg at 10/31/24 " 0910    atorvastatin (LIPITOR) tablet 40 mg  40 mg Oral Daily Nikki Caceres MD   40 mg at 10/31/24 0909    cholecalciferol (VITAMIN D3) capsule 1,250 mcg  1,250 mcg Oral Q7 Days Evelia Grimm DO   1,250 mcg at 10/22/24 1124    cinacalcet (SENSIPAR) tablet 30 mg  30 mg Oral Daily Presley Barrera MD   30 mg at 10/31/24 0909    cloNIDine (CATAPRES) tablet 0.1 mg  0.1 mg Oral BID Presley Barrera MD   0.1 mg at 10/31/24 2030    furosemide (LASIX) tablet 40 mg  40 mg Oral Daily Presley Barrera MD   40 mg at 10/31/24 0909    gabapentin (NEURONTIN) capsule 100 mg  100 mg Oral Q6H PRN Nikki Caceres MD        lisinopril (ZESTRIL) tablet 40 mg  40 mg Oral Daily Presley Barrera MD   40 mg at 10/31/24 0910    melatonin tablet 3 mg  3 mg Oral At Bedtime Presley Barrera MD   3 mg at 10/31/24 2030    metoprolol succinate ER (TOPROL XL) 24 hr tablet 50 mg  50 mg Oral Daily Presley Barrera MD   50 mg at 10/31/24 0910    nicotine (NICODERM CQ) 14 MG/24HR 24 hr patch 1 patch  1 patch Transdermal Daily Evelia Grimm DO   1 patch at 10/31/24 0910    nicotine (NICODERM CQ) 21 MG/24HR 24 hr patch 1 patch  1 patch Transdermal Daily PRN Britt Kang MD   1 patch at 10/13/24 1611    nicotine (NICORETTE) gum 2 mg  2 mg Buccal Q1H PRN González Garcia MD   2 mg at 10/24/24 1254    OLANZapine (zyPREXA) tablet 5 mg  5 mg Oral TID PRN Evelia Grimm DO   5 mg at 10/24/24 1101    Or    OLANZapine (zyPREXA) injection 5 mg  5 mg Intramuscular TID PRN Evelia Grimm DO        OLANZapine zydis (zyPREXA) ODT half-tab 2.5 mg  2.5 mg Oral QAM Presley Barrera MD   2.5 mg at 10/31/24 0909    OLANZapine zydis (zyPREXA) ODT tab 15 mg  15 mg Oral At Bedtime Presley Barrera MD   15 mg at 10/31/24 2031    polyethylene glycol (MIRALAX) Packet 17 g  17 g Oral Daily PRN Nikki Caceres MD        traZODone (DESYREL) half-tab 25 mg  25 mg Oral At Bedtime PRN Evelia Grimm DO   25 mg at 10/22/24 2225    Or    traZODone (DESYREL) tablet 50 mg  50 mg Oral At  Bedtime PRN Evelia Grimm DO         PRN:  Current Facility-Administered Medications   Medication Dose Route Frequency Provider Last Rate Last Admin    acetaminophen (TYLENOL) tablet 650 mg  650 mg Oral Q4H PRN Nikki Caceres MD   650 mg at 10/17/24 0837    alum & mag hydroxide-simethicone (MAALOX) suspension 30 mL  30 mL Oral Q4H PRN Nikki Caceres MD   30 mL at 10/17/24 0837    amLODIPine (NORVASC) tablet 10 mg  10 mg Oral Daily Presley Barrera MD   10 mg at 10/31/24 0910    atorvastatin (LIPITOR) tablet 40 mg  40 mg Oral Daily Nikki Caceres MD   40 mg at 10/31/24 0909    cholecalciferol (VITAMIN D3) capsule 1,250 mcg  1,250 mcg Oral Q7 Days Evelia Grimm DO   1,250 mcg at 10/22/24 1124    cinacalcet (SENSIPAR) tablet 30 mg  30 mg Oral Daily Presley Barrera MD   30 mg at 10/31/24 0909    cloNIDine (CATAPRES) tablet 0.1 mg  0.1 mg Oral BID Presley Barrera MD   0.1 mg at 10/31/24 2030    furosemide (LASIX) tablet 40 mg  40 mg Oral Daily Presley Barrera MD   40 mg at 10/31/24 0909    gabapentin (NEURONTIN) capsule 100 mg  100 mg Oral Q6H PRN Nikki Caceres MD        lisinopril (ZESTRIL) tablet 40 mg  40 mg Oral Daily Presley aBrrera MD   40 mg at 10/31/24 0910    melatonin tablet 3 mg  3 mg Oral At Bedtime Presley Barrera MD   3 mg at 10/31/24 2030    metoprolol succinate ER (TOPROL XL) 24 hr tablet 50 mg  50 mg Oral Daily Presley Barrera MD   50 mg at 10/31/24 0910    nicotine (NICODERM CQ) 14 MG/24HR 24 hr patch 1 patch  1 patch Transdermal Daily Evelia Grimm DO   1 patch at 10/31/24 0910    nicotine (NICODERM CQ) 21 MG/24HR 24 hr patch 1 patch  1 patch Transdermal Daily PRN Britt Kang MD   1 patch at 10/13/24 1611    nicotine (NICORETTE) gum 2 mg  2 mg Buccal Q1H PRN González Garcia MD   2 mg at 10/24/24 1254    OLANZapine (zyPREXA) tablet 5 mg  5 mg Oral TID PRN Evelia Grimm DO   5 mg at 10/24/24 1101    Or    OLANZapine (zyPREXA) injection 5 mg  5 mg Intramuscular TID PRN Evelia Grimm, DO   "      OLANZapine zydis (zyPREXA) ODT half-tab 2.5 mg  2.5 mg Oral QAM Presley Barrera MD   2.5 mg at 10/31/24 0909    OLANZapine zydis (zyPREXA) ODT tab 15 mg  15 mg Oral At Bedtime Presley Barrera MD   15 mg at 10/31/24 2031    polyethylene glycol (MIRALAX) Packet 17 g  17 g Oral Daily PRN Nikki Caceres MD        traZODone (DESYREL) half-tab 25 mg  25 mg Oral At Bedtime PRN Evleia Grimm DO   25 mg at 10/22/24 2225    Or    traZODone (DESYREL) tablet 50 mg  50 mg Oral At Bedtime PRN Evelia Grimm DO         Labs and Imaging:  Data this admission:  - CBC unremarkable  - CMP unremarkable  - TSH normal  - UDS negative  - Vit D low  - Hgb A1c 5.9 (10/13/24)  - Lipids unremarkable  - Vit B12 normal  - Folate normal  - Urinalysis unremarkable  - EKG normal sinus rhythm, QTc 390  - Head CT showed no acute changes  - HIV non reactive  - Treponema antibody non reactive  - ESR wnl   - Ceruloplasmin wnl   - BOOGIE negative  - Lyme negative     Parathyroid hormone:   10/13: 85    Calcium:  10/14: 11.4  10/16: 10.3  10/17: 10.3  10/19: 9.8  10/21: 10.6  10/23: 10.3    Albumin:  10/14: 4.3  10/16: 4.3  10/17: 4.1  10/19: 4.2  10/21: 4.5  10/23: 4.3     MRI:   IMPRESSION:  1. No acute intracranial pathology.  2. No focal lesion or structural abnormality.   3. Symmetric frontoparietal cortical volume loss slightly more than  expected for age.     Mental Status Exam:     Oriented to:  Oriented to date. Not oriented to location or situation.   General:  Awake and Confused  Appearance:  appears stated age, in hospital scrubs  Behavior/Attitude: calm, cooperative, engaged  Eye Contact:  appropriate  Psychomotor: no evidence of tics, dystonia, or tardive dyskinesia, agitated, and restless no catatonia present  Speech:  appropriate volume/tone  Language: Fluent in English with appropriate syntax and vocabulary  Mood:  \"good\"   Affect:  congruent with mood (smiling intermittently)  Thought Process:  looseness of association, disorganized, " and confused  Thought Content:  did not assess SI/HI, appears to be having VH; no apparent delusions  Associations:  loose  Insight:  impaired   Judgment:  partial   Impulse control: limited  Attention Span:  decreased  Concentration: Distractible  Recent and Remote Memory:  recent memory impaired, remote memory intact  Fund of Knowledge:  estimated average  Muscle Strength and Tone: normal  Gait and Station: Normal     Psychiatric Assessment     Estevan Aaron is a 64 year old male with previous psychiatric diagnoses of GEORGINA admitted from the ER on 10/12/2024 due to concern for HI and psychosis in the context of medical issues (hyperthyroidism, hypercalcemia), psychosocial stressors including with recent divorce and selling his home. This is the patient's first psychiatric hospitalization. Significant symptoms on admission included delusions of persecution with grandiose beliefs, homicidal ideations, as well as disorganized thinking and behavior. The MSE on admission was pertinent for a thought process which was perseverative, circumstantial, tangential, disorganized and tangential; with rambling and looseness of associations,. Psychological contributions to presentation included lack of insight. Social factors contributing to presentation include isolation, recent divorce, selling his house, and moving from hotel to hotel. Biological contributions to presentation included a history of hyperparathyroidism with chronic hypercalcemia per charts as well as a history of methamphetamine use per collateral from ex-wife.     History was difficult to obtain due to the patient's severe disorganized thinking on interview; he was observed with 1) persecutory delusions pertaining to the realtor and gang members, 2) disorganized behavior as these delusions have led him to flee to different hotels to stay safe/ has called  complaining of being targeted and 3) auditory hallucinations of voices for the past 12 months. Per  collateral from ex-wife, Santos has had paranoid ideations since they first met. He has always felt like people are out to get him or trying to rip him off. She says that he has had visual hallucinations (he will point things out that the rest of the family can't see) for a long time, but the family would just go along with him to avoid making him angry. Things began to worsen around the beginning of the COVID pandemic, when Satnos became more isolated and the family started to notice cognitive issues such as impaired memory. Immediately prior to this hospitalization, the ex-wife found Santos in a hotel room with a generator full of gasoline, binoculars, and hunting equipment. This time course does not suggest acute psychosis, however given the patient has never had a full psychiatric work-up, it is reasonable to obtain a first-episode psychosis work-up.     Other things that could be contributing to his presentation is hyperparathyroidism with hypercalcemia. However, patient's calcium returned to normal limits on 10/16, and patient continues to have psychosis so likely not a significant contributing factor to patient's presentation.     Patient also continues to be disoriented and his behaviors appear to worsen in the evenings. This raises concern for underlying neurocognitive disorder with delirium. Patient received MRI brain with and without contrast which showed frontal and parietal atrophy greater than would be expected for patient's age. This is consistent with neurocognitive disorder. Therefore, will attempt to lower patient's antipsychotic and see if he begins to improve. Patient will likely require long-term memory care.     Given that he currently has psychosis, patient warrants inpatient psychiatric hospitalization to maintain his safety.     Psychiatric Plan by Diagnosis      Today's changes:  - Decrease evening dose of Zyprexa to 10 mg      # Bipolar I Disorder, current manic episode with psychotic features     1. Medications:  - Discontinued PTA fluoxetine 40 mg, consider restarting at a later time   - Olanzapine 2.5 mg during day and 10 mg at bedtime     2. Pertinent Labs/Monitoring:   - See above     3. Additional Plans:  - Patient will be treated in therapeutic milieu with appropriate individual and group therapies as described     # Unspecified anxiety vs Generalized Anxiety Disorder  - Monitor for symptoms.  - Fluoxetine held due to suspicion of ongoing manic symptoms     Psychiatric Hospital Course:      Estevan Aaron was admitted to [facility] on a 72 hour hold.   Medications:  PTA fluoxetine was held due to concern for worsening of zelalem   New medications started at the time of admission include Zyprexa.   Increased olanzapine 10 mg at bedtime was to 10 mg BID (10/14)   Increased olanzapine 10 mg BID to 10 mg during day and 15 mg at bedtime (10/15)  10/21: increased Zyprexa pm dose from 15 to 20 mg  10/23: started melatonin 3 mg at 7 pm to help patient with circadian rhythm as he has been staying up throughout the night and sleeping a lot during the day and there is some concern for delirium   10/24: consulted anesthesia for MRI brain   10/28: MRI brain complete, decrease AM zyprexa from 10 to 5 mg  10/29: Morning Zyprexa decreased to 2.5 mg, evening Zyprexa decreased to 15 mg   11/1: Decreased evening Zyprexa to 10 mg     Care conference 10/18/24 with daughter Maria C and ex-wife Clifford  - Report that patient has always been paranoid since ex-wife first met him. She describes him always feeling like he is getting ripped off.   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 They reported once incidence in which he made a bid on a job and the client paid him, but he never ended up finishing the job  - Wife and him  officially this year, and since selling the house several months ago, patient has been moving from hotel to hotel due to thinking people are out to get him   - When they were selling their house, patient threatened realtors and felt he was being ripped off   - Clifford reports that she started to be afraid to be around him alone  - When he was found in the hotel, he had a generator full of gasoline, binoculars, bullets, and hunting equipment.    10/21/24: updated daughter on Santos's treatment plan     10/23/24: updated daughter on Santos's treatment plan     10/25/24: updated daughter on Santos's treatment plan, including plan to try and get MRI brain done under sedation. She said that Santos's grandfather had dementia and his sister has a brain tumor.     10/28/24: updated daughter on Santos's MRI results. She is planning on coming into town soon.     Care conference 11/1/24 with daughters Maria C and Estefani and ex-wife Clifford  - Discussed dementia diagnosis   - Clifford asked about plans moving forward. Explained that applying for medical assistance is the first step. Explained that application processing time can be very variable. Informed family that Santos will most likely be staying on this inpatient unit during this time.   - Clifford expressed that their family would like to be the power of /have conservatorship for Santos.   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 Reports that he has bills that he has not paid.   - Maria C is planning to move to [place], and Clifford currently lives in [place]. Family prefer that Santos would in a facility that are near these locations. Discussed with family that they can also try to request a  specific location (memory care).   - Clifford reports that he had previously gotten help applying for his social security and medicare, but unsure if it had been approved.   - Informed family that there is a geriatric unit and there might be a transfer if there becomes availability on this unit.   - Family shared that he was completely different just two months ago.   - Estefani shared that his family found his medications in his old truck recently, expressed that it was likely that he was not taking his medications prior to his hospitalization.     The risks, benefits, alternatives, and side effects were discussed and understood by the patient and other caregivers.     Medical Assessment and Plan     Medical diagnoses to be addressed this admission:    # Hypertension  - Continue PTA medications  Furosemide 40 mg daily  Lisinopril 40 mg daily  Metoprolol 50 mg daily  Amlodipine 10 mg daily  Clonidine 0.1 mg BID     # Hyperlipidemia  - Continue PTA Atorvastatin 40 mg     # Hyperparathyroidism  # Hypercalcemia, hypophosphoremia   Increased Ca level to 10.9 and decreased Ph level to 2.3 in the ED. Suspicion patient's hypercalcemia could be contributing to symptoms of psychosis.  - Consulted endocrinology, who started cinacalcet 30 mg BID on 10/13  - 10/16 endocrinology recommended continuing to trend calcium and albumin to make sure patient does not become hypocalcemic and recommended holding cinacalcet   - 10/17, calcium and albumin wnl, endo recommended cinacalcet 30 mg once daily   - 10/21, per endo continue cincaclcet and recheck calcium and albumin on October 24  - 10/23: per endo, patient needs to follow up outpatient for further management of hyperparathyroidism      Medical course: Patient was physically examined by the ED prior to being transferred to the unit and was found to be medically stable and appropriate for admission.      Consults: Psychiatry, Endocrinology (follow-up of hyperthyroidism / hypercalcemia  and hypertension)     Checklist     Legal Status: Committed     Safety Assessment:   Behavioral Orders   Procedures    Assault precautions    Code 1 - Restrict to Unit    Elopement precautions    Routine Programming     As clinically indicated    Status 15     Every 15 minutes.    Status Individual Observation     1:1 during day shift and evening shift, 2:1 during night shift  If patient refuses overnight presence of staff in his room, it's ok to do 15 min checks through the window blinds.    Patient SIO status reviewed with team/RN.  Please also refer to RN/team documentation for add'l detail.    -SIO staff to monitor following which have contributed to patient being on SIO:  Pt has psychosis regarding being followed and attacked, very paranoid, HI    -Possible interventions SIO staff could use to support patient's treatment progress:  Redirect and reassure  -When following observed, team will review discontinuation of SIO:  Psychosis improves     Order Specific Question:   CONTINUOUS 24 hours / day     Answer:   Other     Order Specific Question:   Specify distance     Answer:   10 feet, 5 feet when close to the doors     Order Specific Question:   Indications for SIO     Answer:   Assault risk     Order Specific Question:   Indications for SIO     Answer:   Severe intrusiveness     Risk Assessment:  Risk for harm is elevated.  Risk factors: impulsive and past behaviors  Protective factors: family     Disposition: Pending stabilization, medication optimization, & development of a safe discharge plan.      Attestations     This patient was seen and discussed with my attending physician.  Presley Barrera MD  PGY-1 Psychiatry Resident    Attestation:  This patient has been seen and evaluated by me, Pete Smith MD.  I have discussed this patient with the house staff team including the resident and/or medical student and I agree with the findings and plan in this note.    I have reviewed today's vital signs,  medications, labs and imaging. Pete Smith MD , PhD.

## 2024-11-01 NOTE — PLAN OF CARE
BEH IP Unit Acuity Rating Score (UARS)  Patient is given one point for every criteria they meet.    CRITERIA SCORING   On a 72 hour hold, court hold, committed, stay of commitment, or revocation. 1    Patient LOS on BEH unit exceeds 20 days. 0  LOS: 20   Patient under guardianship, 55+, otherwise medically complex, or under age 11. 1   Suicide ideation without relief of precipitating factors. 0   Current plan for suicide. 0   Current plan for homicide. 0   Imminent risk or actual attempt to seriously harm another without relief of factors precipitating the attempt. 1   Severe dysfunction in daily living (ex: complete neglect for self care, extreme disruption in vegetative function, extreme deterioration in social interactions). 1   Recent (last 7 days) or current physical aggression in the ED or on unit. 0   Restraints or seclusion episode in past 72 hours. 0   Recent (last 7 days) or current verbal aggression, agitation, yelling, etc., while in the ED or unit. 1 - last 10/30   Active psychosis. 1   Need for constant or near constant redirection (from leaving, from others, etc).  1   Intrusive or disruptive behaviors. 1   Patient requires 3 or more hours of individualized nursing care per 8-hour shift (i.e. for ADLs, meds, therapeutic interventions). 1   TOTAL 9

## 2024-11-01 NOTE — PROGRESS NOTES
Paged Dr. Dagher via [employer] @ [phone] to continue  SIO with 5 feet distance. Per Dr. Dagher will let the team know.

## 2024-11-01 NOTE — PLAN OF CARE
"  Problem: Suicidal Behavior  Goal: Suicidal Behavior is Absent or Managed  Outcome: Progressing   Goal Outcome Evaluation:       Pt has been up and out in the lounge all morning. Pt took his meds and ate breakfast. Pt was perseverating on money he brought to the hospital insisting someone stole it. Staff reassured him it is in our safe and he will get it at discharge. Pt denies SI/SIB/HI and hallucinations. Pt has scattered and disconnected thoughts. Pt c/o the top of his head hurting from his fall this morning. Pt said, \"feel my head, I have a lump.\" Writer felt the top of his head and no bump felt at all.\" Writer offered pt tylenol but pt declined.                  "

## 2024-11-01 NOTE — PROGRESS NOTES
Patient has been sleeping since the start of writer shift he woke up around 1400 and paced the halls. Pt denies all mental health symptoms. Pt denies pain or any physical concerns.  No concerns noted from the remainder for the shift from 11:30-3:00pm.

## 2024-11-01 NOTE — PLAN OF CARE
Problem: Sleep Disturbance  Goal: Adequate Sleep/Rest  Outcome: Progressing   Patient is being monitored per Status Individual Observation due to severe intrussiveness. Number of staff required to monitor 2. Staff to patient distance 10, Staff to remain 10 feet from the patient space. Patient continues on q15 minutes safety checks, no behavioral issues noted. Will continue to monitor the patient and provide therapeutic intervention as needed. Will continue with current plan of care. Notify MD with any concerns. The patient had 5.5 total hours of sleep this shift.

## 2024-11-01 NOTE — PLAN OF CARE
Problem: Psychotic Signs/Symptoms  Goal: Improved Behavioral Control (Psychotic Signs/Symptoms)  Intervention: Manage Behavior  Recent Flowsheet Documentation  Taken 10/31/2024 1755 by Tamie Fraser RN  De-Escalation Techniques: verbally redirected     Problem: Adult Behavioral Health Plan of Care  Goal: Adheres to Safety Considerations for Self and Others  Intervention: Develop and Maintain Individualized Safety Plan  Recent Flowsheet Documentation  Taken 10/31/2024 1755 by Tamie Fraser RN  Safety Measures:   environmental rounds completed   safety rounds completed  Goal: Absence of New-Onset Illness or Injury  Intervention: Identify and Manage Fall Risk  Recent Flowsheet Documentation  Taken 10/31/2024 1755 by Tamie Fraser RN  Safety Measures:   environmental rounds completed   safety rounds completed  Intervention: Prevent Infection  Recent Flowsheet Documentation  Taken 10/31/2024 1855 by Tamie Fraser RN  Infection Prevention: other (see comments)     Problem: Psychotic Signs/Symptoms  Goal: Improved Behavioral Control (Psychotic Signs/Symptoms)  Intervention: Manage Behavior  Recent Flowsheet Documentation  Taken 10/31/2024 1755 by Tamie Fraser RN  De-Escalation Techniques: verbally redirected  Goal: Increased Participation and Engagement (Psychotic Signs/Symptoms)  Intervention: Facilitate Participation and Engagement  Recent Flowsheet Documentation  Taken 10/31/2024 1755 by Tamie Fraser RN  Diversional Activity: television  Goal: Improved Mood Symptoms (Psychotic Signs/Symptoms)  Intervention: Optimize Emotion and Mood  Recent Flowsheet Documentation  Taken 10/31/2024 1755 by Tamie Fraser RN  Diversional Activity: television  Goal: Improved Psychomotor Symptoms (Psychotic Signs/Symptoms)  Intervention: Manage Psychomotor Movement  Recent Flowsheet Documentation  Taken 10/31/2024 1755 by Tamie Fraser RN  Diversional Activity: television  Activity  (Behavioral Health): activity encouraged     Problem: Suicidal Behavior  Goal: Suicidal Behavior is Absent or Managed  Intervention: Provide Immediate and Ongoing Protective Physical Environment  Recent Flowsheet Documentation  Taken 10/31/2024 1755 by Tamie Fraser RN  Safe Transition Promotion: protective factors promoted     Problem: Seclusion/Restraint, Behavioral  Goal: Seclusion/Behavioral Restraint Goal: Absence of Harm or Injury  Intervention: Implement Least Restrictive Safety Strategies  Recent Flowsheet Documentation  Taken 10/31/2024 1755 by Tamie Fraser RN  De-Escalation Techniques: verbally redirected  Safety Measures:   environmental rounds completed   safety rounds completed  Intervention: Protect Skin and Joint Integrity  Recent Flowsheet Documentation  Taken 10/31/2024 1755 by Tamie Fraser RN  Body Position: position changed independently   Goal Outcome Evaluation:    Pt was visible in the milieu watching TV with select peers. Affect  Labile, blunted,  flat. Pt was pleasant on approach even though his thought process was disorganized.Mood is calm, distrustful and suspicious.  Pt was observed pacing the marmolejo  way and socializing with staff.Denies pain or discomfort. Denies all mental health and psych symptoms. Nutrition and hydration adequate. Pt was compliant with medications. No safety concerns. No behavioral concerns noted this shift. Pt family visited and it went well.

## 2024-11-01 NOTE — PROGRESS NOTES
Patient fell off on the right side of the bed at 0648.  Blood pressure (!) 148/85, pulse 51, temperature 97.2  F (36.2  C), temperature source Temporal, resp. rate 20, weight 104.3 kg (230 lb), SpO2 96%.  No LOC. Patient stated he was dreaming. The SIO staff saw the patient fell off on the bed but didn't witness or observed what the patient hit first when he landed on the floor. The patient stood up right after he fell on the floor. The patient complained of tenderness when this writer touch his anterior top right of his head. Neuro check done see flowsheet. Patient able to move all extremities. No bruises or any open area noted on the head. Patient denies any pain but the anterior head. Patient declined to remove his clothes for assessment of his lower body. Dr. Barrera and Dr. Dagher paged and notified via [employer] about the patient's fall. New order for Neuro checks was ordered by Dr. Dagher. Reported to the next shift.

## 2024-11-01 NOTE — PLAN OF CARE
Team Note Due:  Monday    Assessment/Intervention/Current Symtoms and Care Coordination:  Chart review and met with team, discussed pt progress, symptomology, and response to treatment.  Discussed the discharge plan and any potential impediments to discharge.    Met with patient to discuss family coming to visit this afternoon. Also informed pt one of the financial counselors will be calling while family is here to start an application for MA.     Met with pt's ex-wife and two daughters, Maria C and Estefani, for a care conference with MDs and JAYSON/WAYNE Ryan. Discussed pt's diagnosis and recommended placement being memory care. They prefer to find a facility in the [facility]. I let family know I would look into facilities and send a list early next week to start considering. PPS/CM Shelby addressed pursuing emergency guardianship/emergency conservatorship and provided family with a list of attorneys to consult with to start this process. It was emphasized this will need to be started ASAP.     Pt and family spoke with financial counselor to apply for MA. Writer received update from financial counselor who will be following up with Clifford next week as family needed to look into how much money pt has in his accounts.    Discharge Plan or Goal:  Memory care facility     Barriers to Discharge:  Patient requires further psychiatric stabilization due to current symptomology, medication management with changes subject to provider, coordination with outside supports, and aftercare planning. Pt is under civil commitment.     Referral Status:  None at this time     Legal Status:  MI Commitment with [facility]  File Number: [id number]  Start and expiration date of commitment: 10/24/24 - 04/24/25    [facility] meds: Haldol, Clozaril, Risperdal, Invega, Zyprexa, Seroquel, Abilify    PPS:  Shelby Chowdary: [phone]  [email or web address].    Contacts:  Maria C Aaron (Daughter): [phone]    Cliffordjackeline Aaron (ex-wife): [phone]      Upcoming Meetings and Dates/Important Information and next steps:  Send updated DA with new diagnosis to Shelby  Discharge planning when appropriate  Pt will need to apply for MA with financial counselors before a [employer] Assessment/SMRT can be requested for waivers.    Provisional Discharge and Change of Status needed at discharge

## 2024-11-02 PROCEDURE — 250N000013 HC RX MED GY IP 250 OP 250 PS 637

## 2024-11-02 PROCEDURE — 124N000002 HC R&B MH UMMC

## 2024-11-02 RX ADMIN — LISINOPRIL 40 MG: 10 TABLET ORAL at 08:33

## 2024-11-02 RX ADMIN — Medication 3 MG: at 21:05

## 2024-11-02 RX ADMIN — CLONIDINE HYDROCHLORIDE 0.1 MG: 0.1 TABLET ORAL at 08:33

## 2024-11-02 RX ADMIN — OLANZAPINE 2.5 MG: 5 TABLET, ORALLY DISINTEGRATING ORAL at 08:33

## 2024-11-02 RX ADMIN — Medication 1 PATCH: at 08:34

## 2024-11-02 RX ADMIN — CINACALCET 30 MG: 30 TABLET ORAL at 08:34

## 2024-11-02 RX ADMIN — FUROSEMIDE 40 MG: 40 TABLET ORAL at 08:34

## 2024-11-02 RX ADMIN — CLONIDINE HYDROCHLORIDE 0.1 MG: 0.1 TABLET ORAL at 21:05

## 2024-11-02 RX ADMIN — METOPROLOL SUCCINATE 50 MG: 50 TABLET, EXTENDED RELEASE ORAL at 08:33

## 2024-11-02 RX ADMIN — AMLODIPINE BESYLATE 10 MG: 5 TABLET ORAL at 08:33

## 2024-11-02 RX ADMIN — OLANZAPINE 10 MG: 10 TABLET, ORALLY DISINTEGRATING ORAL at 21:05

## 2024-11-02 RX ADMIN — ATORVASTATIN CALCIUM 40 MG: 20 TABLET, FILM COATED ORAL at 08:33

## 2024-11-02 ASSESSMENT — ACTIVITIES OF DAILY LIVING (ADL)
ADLS_ACUITY_SCORE: 0
ORAL_HYGIENE: INDEPENDENT
HYGIENE/GROOMING: INDEPENDENT
ADLS_ACUITY_SCORE: 0
ADLS_ACUITY_SCORE: 0
DRESS: INDEPENDENT
ADLS_ACUITY_SCORE: 0

## 2024-11-02 NOTE — PLAN OF CARE
"  Problem: Sleep Disturbance  Goal: Adequate Sleep/Rest  Outcome: Not Progressing  Intervention: Promote Sleep/Rest  Recent Flowsheet Documentation  Taken 11/2/2024 1100 by Rocío Curry RN  Sleep/Rest Enhancement: regular sleep/rest pattern promoted     Problem: Adult Behavioral Health Plan of Care  Goal: Patient-Specific Goal (Individualization)  Description: You can add care plan individualizations to a care plan. Examples of Individualization might be:  \"Parent requests to be called daily at 9am for status\", \"I have a hard time hearing out of my right ear\", or \"Do not touch me to wake me up as it startles  me\".  Outcome: Progressing  Goal: Adheres to Safety Considerations for Self and Others  Outcome: Progressing  Intervention: Develop and Maintain Individualized Safety Plan  Recent Flowsheet Documentation  Taken 11/2/2024 1100 by Rocío Curry RN  Safety Measures: environmental rounds completed  Goal: Absence of New-Onset Illness or Injury  Outcome: Progressing  Intervention: Identify and Manage Fall Risk  Recent Flowsheet Documentation  Taken 11/2/2024 1100 by Rocío Curry RN  Safety Measures: environmental rounds completed     Problem: Depression  Goal: Improved Mood  Outcome: Progressing  Intervention: Monitor and Manage Depressive Symptoms  Recent Flowsheet Documentation  Taken 11/2/2024 1100 by Rocío Curry RN  Supportive Measures:   verbalization of feelings encouraged   positive reinforcement provided   active listening utilized  Family/Support System Care:   involvement promoted   self-care encouraged   support provided     Problem: Suicidal Behavior  Goal: Suicidal Behavior is Absent or Managed  Outcome: Progressing  Intervention: Provide Immediate and Ongoing Protective Physical Environment  Recent Flowsheet Documentation  Taken 11/2/2024 1100 by Rocío Curry RN  Safe Transition Promotion: protective factors promoted     Problem: " Anxiety  Goal: Anxiety Reduction or Resolution  Outcome: Progressing  Intervention: Promote Anxiety Reduction  Recent Flowsheet Documentation  Taken 11/2/2024 1100 by Rocío Curry RN  Supportive Measures:   verbalization of feelings encouraged   positive reinforcement provided   active listening utilized  Family/Support System Care:   involvement promoted   self-care encouraged   support provided   Goal Outcome Evaluation:    Plan of Care Reviewed With: patient    Pt is intermittently visible in the milieu. Pt is eating and drinking adequately. No shower done this shift. He spilled his coffee by the lounge area and he changed his scrubs then. He felt bad about spilling. Pt was re assured by staff. Pt continues to be confused  and was having some scattered thoughts. He approaches the nurses' station asking for his money and bill fold. Showed some irritability but  was redirected by staff. Pt took a long nap in the morning. Per report, he slept for 2 hours only last night.   Pt had his family visit in the afternoon. Pt agreed for daughter Maria C to take his cash and credit cards. Pt thought that he is leaving with family. Pt banged the main door to get out and had been trying to open other doors. Pt was suspicious that some people are stealing his property .  Staff and RN continues to redirect him.     Continues to be on SIO 1:1 on days and evening, 2:1 at night for severe intrusiveness.

## 2024-11-02 NOTE — PLAN OF CARE
Patient exhibited a lack of insight regarding his situation and environment. Displayed disorganized and disoriented behavior, as he is observed aimlessly looking for thinks in the group and checking the exit doors, Patient intermittently required repeated redirection from staff some intrusive behaviors were noted, however he appears to have improved from baseline, his vitals were initially low improvements were noted on repeat vitals. 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   Problem: Adult Behavioral Health Plan of Care  Goal: Optimized Coping Skills in Response to Life Stressors  Outcome: Not Progressing  Flowsheets (Taken 11/1/2024 2051)  Optimized Coping Skills in Response to Life Stressors: unable to achieve outcome  Note: Patient continues to present with disorganized thinking.      Problem: Adult Behavioral Health Plan of Care  Goal: Adheres to Safety Considerations for Self and Others  Intervention: Develop and Maintain Individualized Safety Plan  Recent Flowsheet Documentation  Taken 11/1/2024 2000 by Alice Sethi RN  Safety Measures:   environmental rounds completed   safety rounds completed     Problem: Psychotic Signs/Symptoms  Goal: Improved Behavioral Control (Psychotic Signs/Symptoms)  Outcome: Progressing  Intervention: Manage Behavior  Recent Flowsheet Documentation  Taken 11/1/2024 2000 by Alice Sethi RN  De-Escalation Techniques: verbally redirected   Goal Outcome Evaluation:    Plan of Care Reviewed With: patient

## 2024-11-02 NOTE — PLAN OF CARE
Problem: Sleep Disturbance  Goal: Adequate Sleep/Rest  Outcome: Progressing   Goal Outcome Evaluation:     Pt has been pacing the marmolejo intermittently this shift. Pt has been intrusive and difficult to redirect at times. Pt c/o of not being able to sleep,  and was offered PRN trazodone, which he initially agreed to take. When writer went to give the medication to the pt, he declined.   Pt appears to have slept for 2 hours this shift.    Blood pressure 117/69, pulse 56, temperature 98.5  F (36.9  C), temperature source Oral, resp. rate 18, weight 104.3 kg (230 lb), SpO2 95%.

## 2024-11-03 PROCEDURE — 250N000013 HC RX MED GY IP 250 OP 250 PS 637

## 2024-11-03 PROCEDURE — 124N000002 HC R&B MH UMMC

## 2024-11-03 RX ADMIN — CINACALCET 30 MG: 30 TABLET ORAL at 08:33

## 2024-11-03 RX ADMIN — Medication 3 MG: at 20:35

## 2024-11-03 RX ADMIN — ATORVASTATIN CALCIUM 40 MG: 20 TABLET, FILM COATED ORAL at 08:33

## 2024-11-03 RX ADMIN — FUROSEMIDE 40 MG: 40 TABLET ORAL at 08:33

## 2024-11-03 RX ADMIN — CLONIDINE HYDROCHLORIDE 0.1 MG: 0.1 TABLET ORAL at 20:35

## 2024-11-03 RX ADMIN — LISINOPRIL 40 MG: 10 TABLET ORAL at 08:34

## 2024-11-03 RX ADMIN — AMLODIPINE BESYLATE 10 MG: 5 TABLET ORAL at 08:34

## 2024-11-03 RX ADMIN — CLONIDINE HYDROCHLORIDE 0.1 MG: 0.1 TABLET ORAL at 08:33

## 2024-11-03 RX ADMIN — OLANZAPINE 2.5 MG: 5 TABLET, ORALLY DISINTEGRATING ORAL at 08:33

## 2024-11-03 RX ADMIN — OLANZAPINE 10 MG: 10 TABLET, ORALLY DISINTEGRATING ORAL at 20:35

## 2024-11-03 RX ADMIN — METOPROLOL SUCCINATE 50 MG: 50 TABLET, EXTENDED RELEASE ORAL at 08:33

## 2024-11-03 RX ADMIN — Medication 1 PATCH: at 08:42

## 2024-11-03 ASSESSMENT — ACTIVITIES OF DAILY LIVING (ADL)
ADLS_ACUITY_SCORE: 0
ORAL_HYGIENE: INDEPENDENT
ADLS_ACUITY_SCORE: 0
HYGIENE/GROOMING: INDEPENDENT
ADLS_ACUITY_SCORE: 0
DRESS: INDEPENDENT
ADLS_ACUITY_SCORE: 0
ADLS_ACUITY_SCORE: 0
DRESS: INDEPENDENT
ADLS_ACUITY_SCORE: 0
ORAL_HYGIENE: INDEPENDENT
ADLS_ACUITY_SCORE: 0
ADLS_ACUITY_SCORE: 0
HYGIENE/GROOMING: INDEPENDENT
ADLS_ACUITY_SCORE: 0
ADLS_ACUITY_SCORE: 0

## 2024-11-03 NOTE — PLAN OF CARE
Problem: Sleep Disturbance  Goal: Adequate Sleep/Rest  Outcome: Progressing    Pt appears to have slept for  7 hours. Pt denies pain and no PRN medications were given. Pt denies anxiety, depression, and SI/SIB. Pt is on 2-1 SIO for assault risk and severe intrusiveness for night shift. 15 minutes safety checks were in place. Staff will continue to offer support to pt.

## 2024-11-03 NOTE — PLAN OF CARE
Patient was observed in the milieu watching tv with peers, he appeared calm, was compliant with care management, he denied pain and discomfort, writer was unable to assess patient's mental state as he continues to present with disorganized confused state, denied all mental health symptoms, intake was adequate.   Problem: Adult Behavioral Health Plan of Care  Goal: Adheres to Safety Considerations for Self and Others  Outcome: Progressing  Flowsheets (Taken 11/2/2024 2238)  Adheres to Safety Considerations for Self and Others: making progress toward outcome  Intervention: Develop and Maintain Individualized Safety Plan  Recent Flowsheet Documentation  Taken 11/2/2024 2200 by Alice Sethi, RN  Safety Measures: environmental rounds completed   Goal Outcome Evaluation:    Plan of Care Reviewed With: patient

## 2024-11-03 NOTE — PLAN OF CARE
Problem: Adult Behavioral Health Plan of Care  Goal: Adheres to Safety Considerations for Self and Others  Outcome: Progressing  Intervention: Develop and Maintain Individualized Safety Plan  Recent Flowsheet Documentation  Taken 11/3/2024 1300 by Rocío Curry RN  Safety Measures: environmental rounds completed     Problem: Depression  Goal: Improved Mood  Outcome: Progressing  Intervention: Monitor and Manage Depressive Symptoms  Recent Flowsheet Documentation  Taken 11/3/2024 1300 by Rocío Curry, RN  Family/Support System Care:   involvement promoted   self-care encouraged   support provided     Problem: Anxiety  Goal: Anxiety Reduction or Resolution  Outcome: Progressing  Intervention: Promote Anxiety Reduction  Recent Flowsheet Documentation  Taken 11/3/2024 1300 by Rocío Curry, RN  Family/Support System Care:   involvement promoted   self-care encouraged   support provided   Goal Outcome Evaluation:    Plan of Care Reviewed With: patient      Pt appears sleepy  this shift. He is observed to be snoozing off and on by the lounge area. He took a nap in the morning. He is pleasant and cooperative, medication compliant. He complained of mild pain at the back, unable to rate it and did not want any intervention. Pt continues to be disorganized , having scattered thoughts. At one point, he was able to carry out conversation with this RN about his previous job and his children when they were young. Pt nutrition and hydration is adequate. He sat by the lounge area and watched  football game. Pt does not appear to engage in what he was watching. No interaction observed with peers.     Continues to be on SIO 1:1 for severe intrusiveness.

## 2024-11-04 PROCEDURE — 250N000013 HC RX MED GY IP 250 OP 250 PS 637

## 2024-11-04 PROCEDURE — 99232 SBSQ HOSP IP/OBS MODERATE 35: CPT | Mod: GC | Performed by: PSYCHIATRY & NEUROLOGY

## 2024-11-04 PROCEDURE — 124N000002 HC R&B MH UMMC

## 2024-11-04 PROCEDURE — 97150 GROUP THERAPEUTIC PROCEDURES: CPT | Mod: GO

## 2024-11-04 RX ADMIN — CINACALCET 30 MG: 30 TABLET ORAL at 08:56

## 2024-11-04 RX ADMIN — Medication 1 PATCH: at 09:07

## 2024-11-04 RX ADMIN — ACETAMINOPHEN 650 MG: 325 TABLET, FILM COATED ORAL at 01:41

## 2024-11-04 RX ADMIN — AMLODIPINE BESYLATE 10 MG: 5 TABLET ORAL at 08:56

## 2024-11-04 RX ADMIN — LISINOPRIL 40 MG: 10 TABLET ORAL at 08:56

## 2024-11-04 RX ADMIN — FUROSEMIDE 40 MG: 40 TABLET ORAL at 08:56

## 2024-11-04 RX ADMIN — CLONIDINE HYDROCHLORIDE 0.1 MG: 0.1 TABLET ORAL at 19:32

## 2024-11-04 RX ADMIN — Medication 3 MG: at 19:30

## 2024-11-04 RX ADMIN — CLONIDINE HYDROCHLORIDE 0.1 MG: 0.1 TABLET ORAL at 08:56

## 2024-11-04 RX ADMIN — ATORVASTATIN CALCIUM 40 MG: 20 TABLET, FILM COATED ORAL at 08:56

## 2024-11-04 RX ADMIN — OLANZAPINE 2.5 MG: 5 TABLET, ORALLY DISINTEGRATING ORAL at 08:56

## 2024-11-04 RX ADMIN — METOPROLOL SUCCINATE 50 MG: 50 TABLET, EXTENDED RELEASE ORAL at 08:56

## 2024-11-04 RX ADMIN — Medication 25 MG: at 01:41

## 2024-11-04 RX ADMIN — OLANZAPINE 7.5 MG: 5 TABLET, ORALLY DISINTEGRATING ORAL at 19:31

## 2024-11-04 ASSESSMENT — ACTIVITIES OF DAILY LIVING (ADL)
ADLS_ACUITY_SCORE: 0
DRESS: SCRUBS (BEHAVIORAL HEALTH);INDEPENDENT
ADLS_ACUITY_SCORE: 0
DRESS: INDEPENDENT
ADLS_ACUITY_SCORE: 0
ADLS_ACUITY_SCORE: 0
ORAL_HYGIENE: INDEPENDENT
ADLS_ACUITY_SCORE: 0
ORAL_HYGIENE: INDEPENDENT
ADLS_ACUITY_SCORE: 0
HYGIENE/GROOMING: INDEPENDENT
ADLS_ACUITY_SCORE: 0
HYGIENE/GROOMING: INDEPENDENT

## 2024-11-04 NOTE — PROVIDER NOTIFICATION
11/04/24 0913   Individualization/Patient Specific Goals   Patient Personal Strengths resourceful;resilient;positive vocational history;ability to maintain sobriety   Patient Vulnerabilities housing insecurity;lacks insight into illness;poor impulse control   Interprofessional Rounds   Summary Discussed patient progress and improvement in pt's behaviors (less intrusive and agitated, not trying to elope) but continues to exhibit confusion and disorientation.   Participants nursing;CTC;psychiatrist;other (see comments);pharmacy   Behavioral Team Discussion   Participants Dr. Vilma MD; Dr. Jose MD; Timothy GRACE; Kylee Becerra [facility]; medical students; pharmacy resident   Progress Pt has improved slightly   Anticipated length of stay 60+ days   Continued Stay Criteria/Rationale Medication management; symptom stabilization; care coordination   Medical/Physical See H&P   Precautions See below   Plan Psychiatric assessment/Medication management. Therapeutic Milieu. Individual care planning and after care planning. Patient to participate in unit groups and activities. Individual and group support on unit.   Safety Plan Completed by unit therapist prior to discharge   Anticipated Discharge Disposition other (see comments)  (Memory Care)     PRECAUTIONS AND SAFETY    Behavioral Orders   Procedures    Assault precautions    Code 1 - Restrict to Unit    Routine Programming     As clinically indicated    Status 15     Every 15 minutes.    Status Individual Observation     1:1 during day shift, evening shift, & night shift.    If patient refuses overnight presence of staff in his room, it's ok to do 15 min checks through the window blinds.    Patient SIO status reviewed with team/RN.  Please also refer to RN/team documentation for add'l detail.    -SIO staff to monitor following which have contributed to patient being on SIO:  Pt has psychosis regarding being followed and attacked, very paranoid, HI    -Possible  interventions SIO staff could use to support patient's treatment progress:  Redirect and reassure  -When following observed, team will review discontinuation of SIO:  Psychosis improves     Order Specific Question:   CONTINUOUS 24 hours / day     Answer:   Other     Order Specific Question:   Specify distance     Answer:   10 feet, 5 feet when close to the doors     Order Specific Question:   Indications for SIO     Answer:   Assault risk     Order Specific Question:   Indications for SIO     Answer:   Severe intrusiveness       Safety  Safety WDL: WDL  Patient Location: lounge, patient room, own, hallway  Observed Behavior: calm, walking, sitting  Observed Behavior (Comment): pacing hallway  Airway Safety Measures: other (see comments)  Safety Measures: environmental rounds completed  Diversional Activity: television  De-Escalation Techniques: verbally redirected  Suicidality: Status 15, SIO (Status Individual Observation)  (NOTE - order will specify distance  Assault: status continuous sight, private room, status 15  Elopement Assessment: Loitering near exit doors, Statements about wanting to leave  Elopement Interventions: status continuous sight, no shoes, room away from unit doors

## 2024-11-04 NOTE — PLAN OF CARE
Patient was observed out in the milieu dowsing off intermittently, he watched tv and paced the hallway, his mood was calm consistent with affect, continues to slightly improve however no significance noted, she denied pain, insight is inappropriate.  Problem: Psychotic Signs/Symptoms  Goal: Increased Participation and Engagement (Psychotic Signs/Symptoms)  Outcome: Progressing  Intervention: Facilitate Participation and Engagement  Recent Flowsheet Documentation  Taken 11/3/2024 1900 by Alice Sethi RN  Diversional Activity: television  Goal: Improved Mood Symptoms (Psychotic Signs/Symptoms)  Outcome: Progressing  Intervention: Optimize Emotion and Mood  Recent Flowsheet Documentation  Taken 11/3/2024 1900 by Alice Sethi RN  Diversional Activity: television   Goal Outcome Evaluation:    Plan of Care Reviewed With: patient

## 2024-11-04 NOTE — PLAN OF CARE
BEH IP Unit Acuity Rating Score (UARS)  Patient is given one point for every criteria they meet.    CRITERIA SCORING   On a 72 hour hold, court hold, committed, stay of commitment, or revocation. 1    Patient LOS on BEH unit exceeds 20 days. 1  LOS: 23   Patient under guardianship, 55+, otherwise medically complex, or under age 11. 1   Suicide ideation without relief of precipitating factors. 0   Current plan for suicide. 0   Current plan for homicide. 0   Imminent risk or actual attempt to seriously harm another without relief of factors precipitating the attempt. 1   Severe dysfunction in daily living (ex: complete neglect for self care, extreme disruption in vegetative function, extreme deterioration in social interactions). 1   Recent (last 7 days) or current physical aggression in the ED or on unit. 0   Restraints or seclusion episode in past 72 hours. 0   Recent (last 7 days) or current verbal aggression, agitation, yelling, etc., while in the ED or unit. 0   Active psychosis. 1   Need for constant or near constant redirection (from leaving, from others, etc).  0   Intrusive or disruptive behaviors. 0   Patient requires 3 or more hours of individualized nursing care per 8-hour shift (i.e. for ADLs, meds, therapeutic interventions). 1   TOTAL 7

## 2024-11-04 NOTE — PLAN OF CARE
"  Problem: Adult Behavioral Health Plan of Care  Goal: Adheres to Safety Considerations for Self and Others  Outcome: Progressing  Intervention: Develop and Maintain Individualized Safety Plan  Recent Flowsheet Documentation  Taken 11/4/2024 1200 by Rocío Curry RN  Safety Measures: environmental rounds completed     Problem: Depression  Goal: Improved Mood  Outcome: Progressing  Intervention: Monitor and Manage Depressive Symptoms  Recent Flowsheet Documentation  Taken 11/4/2024 1200 by Rocío Curry RN  Family/Support System Care:   involvement promoted   self-care encouraged   support provided     Problem: Suicidal Behavior  Goal: Suicidal Behavior is Absent or Managed  Outcome: Progressing  Intervention: Provide Immediate and Ongoing Protective Physical Environment  Recent Flowsheet Documentation  Taken 11/4/2024 1200 by Rocío Curry RN  Safe Transition Promotion: protective factors promoted     Problem: Anxiety  Goal: Anxiety Reduction or Resolution  Outcome: Progressing  Intervention: Promote Anxiety Reduction  Recent Flowsheet Documentation  Taken 11/4/2024 1200 by Rocío Curry RN  Family/Support System Care:   involvement promoted   self-care encouraged   support provided   Goal Outcome Evaluation:    Plan of Care Reviewed With: patient      Pt is intermittently visible in the milieu. He sits by the unge area, watches TV and snoozes every now and then. Pt is pleasant and cooperative, he is medication compliant. Food and fluid intake is adequate. When he took the medications in the morning, pt said \" I don't trust these medications\"  looking at the pills but took them anyway. Pt got a little confused when he awoke from sleep by the lounge area and requested for a cigarette.He got a little irritated when staff redirected him.  During check in with pt, pt said the he will try to \"call his adry so they can do hunting tomorrow.\" Encouraged pt to take shower, " "he said, \"not today. I will go hunting tomorrow\" . Pt complained of back pain that he feels when he gets up from bed, but gets resolved afterward. Pt was observed to be loitering by the main door holding his paper bag of belongings. When he was in front of the door, he  said \" now, I don't know what to do\". Staff redirected him and he was receptive to redirection. Pt able to join 1 OT group in the morning.    Pt SIO discontinued in the morning but on 1:1 for evenings and nights for severe intrusiveness.     "

## 2024-11-04 NOTE — PLAN OF CARE
Team Note Due:  Monday    Assessment/Intervention/Current Symtoms and Care Coordination:  Chart review and met with team, discussed pt progress, symptomology, and response to treatment.  Discussed the discharge plan and any potential impediments to discharge.    Identified multiple memory care facilities in [facility] (preferred locations per family). Sent list to family and PPS/CM Shelby to review.    Discharge Plan or Goal:  Memory care facility     Barriers to Discharge:  Patient requires further psychiatric stabilization due to current symptomology, medication management with changes subject to provider, coordination with outside supports, and aftercare planning. Pt is under civil commitment.     Referral Status:  None at this time     Legal Status:  MI Commitment with [facility]  File Number: [id number]  Start and expiration date of commitment: 10/24/24 - 04/24/25    [facility] meds: Haldol, Clozaril, Risperdal, Invega, Zyprexa, Seroquel, Abilify    PPS:  Shelby Chowdary: [phone]  [email or web address].    Contacts:  Maria C Agnieszka (Daughter): [phone]   Clifford Agnieszka (ex-wife): [phone]      Upcoming Meetings and Dates/Important Information and next steps:  Send updated DA with new diagnosis to Shelby  Discharge planning when appropriate  Pt will need to apply for MA with financial counselors before a [employer] Assessment/SMRT can be requested for waivers.    Provisional Discharge and Change of Status needed at discharge

## 2024-11-04 NOTE — PLAN OF CARE
Problem: Sleep Disturbance  Goal: Adequate Sleep/Rest  Outcome: Progressing    Pt appears to have slept for  4.25  hours. PRN Tylenol was given for lower back pain rated 5/10 and PRN Trazodone was given to promote sleep. PRN medications were effective, as there was no further complain of pain and pt was able to sleep after medications administration. Pt denies anxiety, depression, and SI/SIB. Pt is on 1-1 SIO for assault risk and severe intrusiveness. 15 minutes safety checks were in place. Staff will continue to offer support to pt.

## 2024-11-04 NOTE — CARE PLAN
Rehab Group    Start time: 1115  End time: 1200  Patient time total: 45 minutes    attended full group    #7 attended   Group Type: OT Clinic   Group Topic Covered: activity therapy and coping skills     Group Session Detail:  Coping skill exploration, creative expression within personally meaningful activities, and observation of social, cognitive, and kinesthetic performance skills     Patient Response/Contribution:  positive affect, cooperative with task, and organized     Patient Detail:  Expressed interest to initiate a self-directed project - one previously started from last session. IND to gather materials, organize work space, and sequence task. Demonstrated fair organization and sustained attention throughout entire session.         [id number] OT Group (2 or more in attendance)      Patient Active Problem List   Diagnosis    Hyperlipidemia LDL goal <130    Hypertension goal BP (blood pressure) < 130/80    Hypercalcemia    Hyperparathyroidism (H)    Thalassemia, unspecified type    Psychosis, unspecified psychosis type (H)    Acute psychosis (H)        equal bilaterally/clear

## 2024-11-04 NOTE — PROGRESS NOTES
"  ----------------------------------------------------------------------------------------------------------  [facility]  Psychiatry Progress Note  Hospital Day #23     Interim History:     The patient's care was discussed with the treatment team and chart notes were reviewed.    Vitals: VSS except for bradycardia (HR 56 and 57 yesterday)   Sleep: 4.25 hours (11/04/24 0600)  Scheduled medications: Took all scheduled medications as prescribed  Psychiatric PRN medications: Trazadone 25 mg tablet x1     Staff Report:   - Pleasant and cooperative with staff   - Intermittently in the milieu, frequently napping in the lounge area   - Reported 5/10 back pain yesterday and was given PRN tylenol   - Continues to be on SIO for assault risk and severe intrusiveness     Please see staff notes for details.      Subjective:     Patient Interview:  Estevan Aaron was interviewed in the milieu. He was sleeping when approached by the team. He was alert and oriented to person and place, but had some difficulty with time orientation. When asked about the current date, he initially stated it was Monday the 9th, then corrected to the 4th when prompted, but was unsure of the month (thought it was October when it is November).     Patient reported his family, including his daughters and ex-wives, visited last Friday. He described the visit neutrally, stating they were \"doing fine.\" Patient mentioned experiencing intermittent back pain that radiates down his leg, particularly when getting out of bed. He denied any numbness or tingling associated with the pain. When discussing this, patient showed some confusion about the timing, initially saying it was \"in the past, way long ago\" before acknowledging it occurred over the weekend.     Patient did not spontaneously report any psychotic symptoms during the interview. He did not express any suicidal ideation, intent, or plan. When informed about the " "plan to decrease his olanzapine dose, patient appeared indifferent, stating \"That's their problem.\" He did not voice any concerns or questions about this medication change.     Overall, patient's affect was flat and his responses were brief. He demonstrated some mild confusion and disorientation but was generally cooperative with the interview.     ROS:  Negative except for above.      Objective:     Vitals:  /67 (BP Location: Right arm, Patient Position: Sitting, Cuff Size: Adult Large)   Pulse 57   Temp 97.5  F (36.4  C) (Oral)   Resp 18   Wt 105.3 kg (232 lb 1.6 oz)   SpO2 93%   BMI 37.46 kg/m      Allergies:  No Known Allergies    Current Medications:  Scheduled:  Current Facility-Administered Medications   Medication Dose Route Frequency Provider Last Rate Last Admin    acetaminophen (TYLENOL) tablet 650 mg  650 mg Oral Q4H PRN Nikki Caceres MD   650 mg at 11/04/24 0141    alum & mag hydroxide-simethicone (MAALOX) suspension 30 mL  30 mL Oral Q4H PRN Nikki Caceres MD   30 mL at 10/17/24 0837    amLODIPine (NORVASC) tablet 10 mg  10 mg Oral Daily Presley Barrera MD   10 mg at 11/03/24 0834    atorvastatin (LIPITOR) tablet 40 mg  40 mg Oral Daily Nikki Caceres MD   40 mg at 11/03/24 0833    cholecalciferol (VITAMIN D3) capsule 1,250 mcg  1,250 mcg Oral Q7 Days SirishaEveliaDO   1,250 mcg at 10/22/24 1124    cinacalcet (SENSIPAR) tablet 30 mg  30 mg Oral Daily Presley Barrera MD   30 mg at 11/03/24 0833    cloNIDine (CATAPRES) tablet 0.1 mg  0.1 mg Oral BID Presley Barrera MD   0.1 mg at 11/03/24 2035    furosemide (LASIX) tablet 40 mg  40 mg Oral Daily Presley Barrera MD   40 mg at 11/03/24 0833    gabapentin (NEURONTIN) capsule 100 mg  100 mg Oral Q6H PRN Nikki Caceres MD        lisinopril (ZESTRIL) tablet 40 mg  40 mg Oral Daily Presley Barrera MD   40 mg at 11/03/24 0834    melatonin tablet 3 mg  3 mg Oral At Bedtime Presley Barrera MD   3 mg at 11/03/24 2035    metoprolol " succinate ER (TOPROL XL) 24 hr tablet 50 mg  50 mg Oral Daily Presley Barrera MD   50 mg at 11/03/24 0833    nicotine (NICODERM CQ) 14 MG/24HR 24 hr patch 1 patch  1 patch Transdermal Daily Evelia Grimm DO   1 patch at 11/03/24 0842    nicotine (NICODERM CQ) 21 MG/24HR 24 hr patch 1 patch  1 patch Transdermal Daily PRN Britt Kang MD   1 patch at 10/13/24 1611    nicotine (NICORETTE) gum 2 mg  2 mg Buccal Q1H PRN González Garcia MD   2 mg at 10/24/24 1254    OLANZapine (zyPREXA) tablet 5 mg  5 mg Oral TID PRN Evelia Grimm DO   5 mg at 10/24/24 1101    Or    OLANZapine (zyPREXA) injection 5 mg  5 mg Intramuscular TID PRN Evelia Grimm DO        OLANZapine zydis (zyPREXA) ODT half-tab 2.5 mg  2.5 mg Oral QAM Presley Barrera MD   2.5 mg at 11/03/24 0833    OLANZapine zydis (zyPREXA) ODT tab 10 mg  10 mg Oral At Bedtime Presley Barrera MD   10 mg at 11/03/24 2035    polyethylene glycol (MIRALAX) Packet 17 g  17 g Oral Daily PRN Nikki Caceres MD        traZODone (DESYREL) half-tab 25 mg  25 mg Oral At Bedtime PRN Evelia Grimm DO   25 mg at 11/04/24 0141    Or    traZODone (DESYREL) tablet 50 mg  50 mg Oral At Bedtime PRN Evelia Grimm DO           PRN:  Current Facility-Administered Medications   Medication Dose Route Frequency Provider Last Rate Last Admin    acetaminophen (TYLENOL) tablet 650 mg  650 mg Oral Q4H PRN Nikki Caceres MD   650 mg at 11/04/24 0141    alum & mag hydroxide-simethicone (MAALOX) suspension 30 mL  30 mL Oral Q4H PRN Nikki Caceres MD   30 mL at 10/17/24 0837    amLODIPine (NORVASC) tablet 10 mg  10 mg Oral Daily Presley Barrera MD   10 mg at 11/03/24 0834    atorvastatin (LIPITOR) tablet 40 mg  40 mg Oral Daily Nikki Caceres MD   40 mg at 11/03/24 0833    cholecalciferol (VITAMIN D3) capsule 1,250 mcg  1,250 mcg Oral Q7 Days Evelia Grimm DO   1,250 mcg at 10/22/24 1124    cinacalcet (SENSIPAR) tablet 30 mg  30 mg Oral Daily Presley Barrera MD   30 mg at 11/03/24 0833     cloNIDine (CATAPRES) tablet 0.1 mg  0.1 mg Oral BID Presley Barrera MD   0.1 mg at 11/03/24 2035    furosemide (LASIX) tablet 40 mg  40 mg Oral Daily Presley Barrera MD   40 mg at 11/03/24 0833    gabapentin (NEURONTIN) capsule 100 mg  100 mg Oral Q6H PRN Nikki Caceres MD        lisinopril (ZESTRIL) tablet 40 mg  40 mg Oral Daily Presley Barrera MD   40 mg at 11/03/24 0834    melatonin tablet 3 mg  3 mg Oral At Bedtime Presley Barrera MD   3 mg at 11/03/24 2035    metoprolol succinate ER (TOPROL XL) 24 hr tablet 50 mg  50 mg Oral Daily Presley Barrera MD   50 mg at 11/03/24 0833    nicotine (NICODERM CQ) 14 MG/24HR 24 hr patch 1 patch  1 patch Transdermal Daily Evelia Grimm DO   1 patch at 11/03/24 0842    nicotine (NICODERM CQ) 21 MG/24HR 24 hr patch 1 patch  1 patch Transdermal Daily PRN Britt Kang MD   1 patch at 10/13/24 1611    nicotine (NICORETTE) gum 2 mg  2 mg Buccal Q1H PRN González Garcia MD   2 mg at 10/24/24 1254    OLANZapine (zyPREXA) tablet 5 mg  5 mg Oral TID PRN Evelia Grimm DO   5 mg at 10/24/24 1101    Or    OLANZapine (zyPREXA) injection 5 mg  5 mg Intramuscular TID PRN Evelia Grimm DO        OLANZapine zydis (zyPREXA) ODT half-tab 2.5 mg  2.5 mg Oral QAM Presley Barrera MD   2.5 mg at 11/03/24 0833    OLANZapine zydis (zyPREXA) ODT tab 10 mg  10 mg Oral At Bedtime Presley Barrera MD   10 mg at 11/03/24 2035    polyethylene glycol (MIRALAX) Packet 17 g  17 g Oral Daily PRN Nikki Caceres MD        traZODone (DESYREL) half-tab 25 mg  25 mg Oral At Bedtime PRN Evelia Grimm DO   25 mg at 11/04/24 0141    Or    traZODone (DESYREL) tablet 50 mg  50 mg Oral At Bedtime PRN Evelia Grimm, DO           Labs and Imaging:    Data this admission:  - CBC unremarkable  - CMP unremarkable  - TSH normal  - UDS negative  - Vit D low  - Hgb A1c 5.9 (10/13/24)  - Lipids unremarkable  - Vit B12 normal  - Folate normal  - Urinalysis unremarkable  - EKG normal sinus rhythm, QTc 390  - Head CT showed no  "acute changes  - HIV non reactive  - Treponema antibody non reactive  - ESR wnl   - Ceruloplasmin wnl   - BOOGIE negative  - Lyme negative      Parathyroid hormone:   10/13: 85     Calcium:  10/14: 11.4  10/16: 10.3  10/17: 10.3  10/19: 9.8  10/21: 10.6  10/23: 10.3     Albumin:  10/14: 4.3  10/16: 4.3  10/17: 4.1  10/19: 4.2  10/21: 4.5  10/23: 4.3      MRI:   IMPRESSION:  1. No acute intracranial pathology.  2. No focal lesion or structural abnormality.   3. Symmetric frontoparietal cortical volume loss slightly more than  expected for age.     Mental Status Exam:     Oriented to:  Oriented to person and place. Not oriented to time.   General:  Awake and Confused  Appearance:  appears stated age and Dressed in hospital scrubs  Behavior/Attitude:  Calm, Cooperative, and Indifferent  Eye Contact: Appropriate  Psychomotor: No evidence of tics, dystonia, or tardive dyskinesia  no catatonia present  Speech:  appropriate volume/tone  Language: Fluent in English with appropriate syntax and vocabulary.  Mood:  \"Good\"  Affect:  congruent with mood  Thought Process:  looseness of association, disorganized, and confused  Thought Content:   No reports of SI; No apparent delusions  Associations:  loose  Insight:  impaired   Judgment:  partial   Impulse control: limited  Attention Span:  decreased  Concentration:  distractible  Recent and Remote Memory:  recent memory impaired, remote memory intact  Fund of Knowledge: average  Muscle Strength and Tone: normal  Gait and Station: Normal     Psychiatric Assessment     Estevan Aaron is a 64 year old male with previous psychiatric diagnoses of GEORGINA admitted from the ER on 10/12/2024 due to concern for HI and psychosis in the context of medical issues (hyperthyroidism, hypercalcemia), psychosocial stressors including with recent divorce and selling his home. This is the patient's first psychiatric hospitalization. Significant symptoms on admission included delusions of persecution with " grandiose beliefs, homicidal ideations, as well as disorganized thinking and behavior. The MSE on admission was pertinent for a thought process which was perseverative, circumstantial, tangential, disorganized and tangential; with rambling and looseness of associations,. Psychological contributions to presentation included lack of insight. Social factors contributing to presentation include isolation, recent divorce, selling his house, and moving from hotel to hotel. Biological contributions to presentation included a history of hyperparathyroidism with chronic hypercalcemia per charts as well as a history of methamphetamine use per collateral from ex-wife.     History was difficult to obtain due to the patient's severe disorganized thinking on interview; he was observed with 1) persecutory delusions pertaining to the realtor and gang members, 2) disorganized behavior as these delusions have led him to flee to different hotels to stay safe/ has called  complaining of being targeted and 3) auditory hallucinations of voices for the past 12 months. Per collateral from ex-wife, Santos has had paranoid ideations since they first met. He has always felt like people are out to get him or trying to rip him off. She says that he has had visual hallucinations (he will point things out that the rest of the family can't see) for a long time, but the family would just go along with him to avoid making him angry. Things began to worsen around the beginning of the COVID pandemic, when Santos became more isolated and the family started to notice cognitive issues such as impaired memory. Immediately prior to this hospitalization, the ex-wife found Santos in a hotel room with a generator full of gasoline, binoculars, and hunting equipment. This time course does not suggest acute psychosis, however given the patient has never had a full psychiatric work-up, it is reasonable to obtain a first-episode psychosis work-up.      Other things  that could be contributing to his presentation is hyperparathyroidism with hypercalcemia. However, patient's calcium returned to normal limits on 10/16, and patient continues to have psychosis so likely not a significant contributing factor to patient's presentation.      Patient also continues to be disoriented and his behaviors appear to worsen in the evenings. This raises concern for underlying neurocognitive disorder with delirium. Patient received MRI brain with and without contrast which showed frontal and parietal atrophy greater than would be expected for patient's age. This is consistent with neurocognitive disorder. Therefore, will attempt to lower patient's antipsychotic and see if he begins to improve. Patient will likely require long-term memory care.      Given that he currently has psychosis, patient warrants inpatient psychiatric hospitalization to maintain his safety.     Psychiatric Plan by Diagnosis      Today's changes:  - Decrease evening dose of Zyprexa to 7.5 mg      # Bipolar I Disorder, current manic episode with psychotic features    1. Medications:  - Discontinued PTA fluoxetine 40 mg, consider restarting at a later time   - Olanzapine 2.5 mg during day and 7.5 mg at bedtime     2. Pertinent Labs/Monitoring:   - See above     3. Additional Plans:  - Patient will be treated in therapeutic milieu with appropriate individual and group therapies as described     # Unspecified anxiety vs Generalized Anxiety Disorder  - Monitor for symptoms.  - Fluoxetine held due to suspicion of ongoing manic symptoms     Psychiatric Hospital Course:      Estevan Aaron was admitted to [facility] on a 72 hour hold.   Medications:  PTA fluoxetine was held due to concern for worsening of zelalem   New medications started at the time of admission include Zyprexa.   Increased olanzapine 10 mg at bedtime was to 10 mg BID (10/14)   Increased olanzapine 10 mg BID to 10 mg during day and 15 mg at bedtime (10/15)  10/21:  increased Zyprexa pm dose from 15 to 20 mg  10/23: started melatonin 3 mg at 7 pm to help patient with circadian rhythm as he has been staying up throughout the night and sleeping a lot during the day and there is some concern for delirium   10/24: consulted anesthesia for MRI brain   10/28: MRI brain complete, decrease AM zyprexa from 10 to 5 mg  10/29: Morning Zyprexa decreased to 2.5 mg, evening Zyprexa decreased to 15 mg   11/1: Decreased evening Zyprexa to 10 mg   11/4: Decreased evening Zyprexa to 7.5 mg      Care conference 10/18/24 with daughter Maria C and ex-wife Clifford  - Report that patient has always been paranoid since ex-wife first met him. She describes him always feeling like he is getting ripped off.   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 They reported once incidence in which he made a bid on a job and the client paid him, but he never ended up finishing the job  - Wife and him  officially this year, and since selling the house several months ago, patient has been moving from hotel to hotel due to thinking people are out to get him   - When they were selling their house, patient threatened realtors and felt he was being ripped off   - Clifford reports that she started to be afraid to be around him alone  - When he was found in the hotel, he had a generator full of gasoline, binoculars, bullets, and hunting equipment.     10/21/24: updated daughter on Santos's treatment plan      10/23/24: updated daughter on Santos's treatment plan      10/25/24: updated daughter on  Santos's treatment plan, including plan to try and get MRI brain done under sedation. She said that Santos's grandfather had dementia and his sister has a brain tumor.      10/28/24: updated daughter on Santos's MRI results. She is planning on coming into town soon.      Care conference 11/1/24 with daughters Maria C and Estefani and ex-wife Clifford  - Discussed dementia diagnosis   - Clifford asked about plans moving forward. Explained that applying for medical assistance is the first step. Explained that application processing time can be very variable. Informed family that Santos will most likely be staying on this inpatient unit during this time.   - Clifford expressed that their family would like to be the power of /have conservatorship for Santos.   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 Reports that he has bills that he has not paid.   - Maria C is planning to move to [place], and Clifford currently lives in [place]. Family prefer that Santos would in a facility that are near these locations. Discussed with family that they can also try to request a specific location (memory care).   - Clifford reports that he had previously gotten help applying for his social security and medicare, but unsure if it had been approved.   - Informed family that there is a geriatric unit and there might be a transfer if there becomes availability on this unit.   - Family shared that he was completely different just two months ago.   - Estefani shared that his family found his medications in his old truck recently, expressed that it was likely that he was not taking his medications prior to his hospitalization.      The risks, benefits, alternatives, and side effects were discussed and understood by the patient and other caregivers.     Medical Assessment and Plan     Medical diagnoses to be addressed this admission:    # Hypertension  - Continue PTA medications  Furosemide 40 mg  daily  Lisinopril 40 mg daily  Metoprolol 50 mg daily  Amlodipine 10 mg daily  Clonidine 0.1 mg BID     # Hyperlipidemia  - Continue PTA Atorvastatin 40 mg     # Hyperparathyroidism  # Hypercalcemia, hypophosphoremia   Increased Ca level to 10.9 and decreased Ph level to 2.3 in the ED. Suspicion patient's hypercalcemia could be contributing to symptoms of psychosis.  - Consulted endocrinology, who started cinacalcet 30 mg BID on 10/13  - 10/16 endocrinology recommended continuing to trend calcium and albumin to make sure patient does not become hypocalcemic and recommended holding cinacalcet   - 10/17, calcium and albumin wnl, endo recommended cinacalcet 30 mg once daily   - 10/21, per endo continue cincaclcet and recheck calcium and albumin on October 24  - 10/23: per endo, patient needs to follow up outpatient for further management of hyperparathyroidism      Medical course: Patient was physically examined by the ED prior to being transferred to the unit and was found to be medically stable and appropriate for admission.      Consults: Psychiatry, Endocrinology (follow-up of hyperthyroidism / hypercalcemia and hypertension)     Checklist     Legal Status: Committed     Safety Assessment:   Behavioral Orders   Procedures    Assault precautions    Code 1 - Restrict to Unit    Routine Programming     As clinically indicated    Status 15     Every 15 minutes.    Status Individual Observation     1:1 during day shift, evening shift, & night shift.    If patient refuses overnight presence of staff in his room, it's ok to do 15 min checks through the window blinds.    Patient SIO status reviewed with team/RN.  Please also refer to RN/team documentation for add'l detail.    -SIO staff to monitor following which have contributed to patient being on SIO:  Pt has psychosis regarding being followed and attacked, very paranoid, HI    -Possible interventions SIO staff could use to support patient's treatment  progress:  Redirect and reassure  -When following observed, team will review discontinuation of SIO:  Psychosis improves     Order Specific Question:   CONTINUOUS 24 hours / day     Answer:   Other     Order Specific Question:   Specify distance     Answer:   10 feet, 5 feet when close to the doors     Order Specific Question:   Indications for SIO     Answer:   Assault risk     Order Specific Question:   Indications for SIO     Answer:   Severe intrusiveness       Risk Assessment:  Risk for harm is elevated.  Risk factors: impulsive and past behaviors  Protective factors: family      Disposition: Pending stabilization, medication optimization, & development of a safe discharge plan.      Attestations     This patient was seen and discussed with my resident and attending physician.   Qi Jean, MS3  [facility] Medical Student    Resident/Fellow Attestation   I, González Garcia MD, was present with the medical/ALLEN student who participated in the service and in the documentation of the note.  I have verified the history and personally performed the physical exam and medical decision making.  I agree with the assessment and plan of care as documented in the note.      González Garcia MD  PGY2  Date of Service (when I saw the patient): 11/04/24    Attending Attestation:  I saw and evaluated the patient.  I agree with the history, findings, assessment and plan documented by González Garcia MD.  I personally spent a total of 35 minutes on care for this patient on the date of the encounter. This includes face-to-face as well as non-face-to-face time reviewing the chart, lab review, and coordination of care.     LANA Esparza MD  [facility]  Department of Psychiatry & Behavioral Sciences  11/04/24

## 2024-11-05 PROCEDURE — 99232 SBSQ HOSP IP/OBS MODERATE 35: CPT | Mod: GC | Performed by: PSYCHIATRY & NEUROLOGY

## 2024-11-05 PROCEDURE — 124N000002 HC R&B MH UMMC

## 2024-11-05 PROCEDURE — 250N000013 HC RX MED GY IP 250 OP 250 PS 637

## 2024-11-05 PROCEDURE — 97150 GROUP THERAPEUTIC PROCEDURES: CPT | Mod: GO

## 2024-11-05 RX ORDER — OLANZAPINE 5 MG/1
5 TABLET, ORALLY DISINTEGRATING ORAL AT BEDTIME
Status: DISCONTINUED | OUTPATIENT
Start: 2024-11-05 | End: 2024-12-02

## 2024-11-05 RX ADMIN — OLANZAPINE 5 MG: 5 TABLET, ORALLY DISINTEGRATING ORAL at 19:48

## 2024-11-05 RX ADMIN — CLONIDINE HYDROCHLORIDE 0.1 MG: 0.1 TABLET ORAL at 19:48

## 2024-11-05 RX ADMIN — METOPROLOL SUCCINATE 50 MG: 50 TABLET, EXTENDED RELEASE ORAL at 09:04

## 2024-11-05 RX ADMIN — OLANZAPINE 2.5 MG: 5 TABLET, ORALLY DISINTEGRATING ORAL at 08:50

## 2024-11-05 RX ADMIN — ATORVASTATIN CALCIUM 40 MG: 20 TABLET, FILM COATED ORAL at 08:50

## 2024-11-05 RX ADMIN — Medication 1 PATCH: at 09:03

## 2024-11-05 RX ADMIN — Medication 1250 MCG: at 12:34

## 2024-11-05 RX ADMIN — ACETAMINOPHEN 650 MG: 325 TABLET, FILM COATED ORAL at 09:05

## 2024-11-05 RX ADMIN — CLONIDINE HYDROCHLORIDE 0.1 MG: 0.1 TABLET ORAL at 08:50

## 2024-11-05 RX ADMIN — FUROSEMIDE 40 MG: 40 TABLET ORAL at 09:10

## 2024-11-05 RX ADMIN — LISINOPRIL 40 MG: 10 TABLET ORAL at 08:50

## 2024-11-05 RX ADMIN — CINACALCET 30 MG: 30 TABLET ORAL at 09:23

## 2024-11-05 RX ADMIN — Medication 3 MG: at 19:48

## 2024-11-05 RX ADMIN — AMLODIPINE BESYLATE 10 MG: 5 TABLET ORAL at 08:50

## 2024-11-05 ASSESSMENT — ACTIVITIES OF DAILY LIVING (ADL)
ADLS_ACUITY_SCORE: 0
DRESS: INDEPENDENT
ADLS_ACUITY_SCORE: 0
ORAL_HYGIENE: INDEPENDENT
ADLS_ACUITY_SCORE: 0
DRESS: SCRUBS (BEHAVIORAL HEALTH);INDEPENDENT
ORAL_HYGIENE: INDEPENDENT
ADLS_ACUITY_SCORE: 0
HYGIENE/GROOMING: INDEPENDENT
ADLS_ACUITY_SCORE: 0
HYGIENE/GROOMING: INDEPENDENT
ADLS_ACUITY_SCORE: 0

## 2024-11-05 NOTE — PLAN OF CARE
"  Problem: Psychotic Signs/Symptoms  Goal: Improved Behavioral Control (Psychotic Signs/Symptoms)  Outcome: Progressing  Intervention: Manage Behavior  Recent Flowsheet Documentation  Taken 11/5/2024 1634 by Tamie Fraser RN  De-Escalation Techniques: verbally redirected     Problem: Anxiety  Goal: Anxiety Reduction or Resolution  Outcome: Progressing     I     Problem: Psychotic Signs/Symptoms  Goal: Increased Participation and Engagement (Psychotic Signs/Symptoms)  Intervention: Facilitate Participation and Engagement  Recent Flowsheet Documentation  Taken 11/5/2024 1634 by Tamie Fraser RN  Diversional Activity: television  Goal: Improved Mood Symptoms (Psychotic Signs/Symptoms)  Intervention: Optimize Emotion and Mood  Recent Flowsheet Documentation  Taken 11/5/2024 1634 by Tamie Fraser RN  Diversional Activity: television      Pt was visible in the lounge watching TV dozing on an off. Pt denies pain or discomfort. Stated \"I had leg pain this morning but it is gone now\". Denies all mental health and psych symptoms. Po intake adequate. Pt is pleasant, cooperative and compliant with medication. Hygiene unkempt. Mood is flat and labile.Pt family visited and it went well. Pt  kept walking towards the door demanding to go home. No escalation of behavior this shift.  "

## 2024-11-05 NOTE — PLAN OF CARE
Rehab Group    Start time: 10:25  End time: 11:50  Patient time total: 40 minutes    attended partial group    #5 attended   Group Type: occupational therapy   Group Topic Covered: balanced lifestyle, cognitive activities, coping skills, emotional regulation, healthy leisure time, relaxation , and social skills     Group Session Detail:  Patient engaged in a therapeutic creative task activity (sun-catcher) in order to improve focus/attention on a goal-directed activity, promote social engagement, and foster creativity and creative expression.        Patient Response/Contribution:  cooperative with task, socially appropriate, safe use of materials/supplies, attentive, and actively engaged     Patient Detail:  Patient entered group late. Patient opted to continue work on a previously started task. Patient was independent in workstation set-up. Patient worked in an organized manner and exhibited moments of good problem-solving. Patient worked in a goal-directed manner with good follow-through of verbalized plans and ideas. Patient was intermittently social with writer and spoke about his past occupational experience, artistic interests, and family. Patient was able to sustain attention on the task for roughly 35 minutes in order to reach task completion. Patient endorsed a positive response to activity and completed project. Patient assisted with clean-up of workstation and thanked writer for group prior to exiting. No safety or behavioral concerns observed in group setting.        [id number] OT Group (2 or more in attendance)      Patient Active Problem List   Diagnosis    Hyperlipidemia LDL goal <130    Hypertension goal BP (blood pressure) < 130/80    Hypercalcemia    Hyperparathyroidism (H)    Thalassemia, unspecified type    Psychosis, unspecified psychosis type (H)    Acute psychosis (H)

## 2024-11-05 NOTE — PLAN OF CARE
Problem: Psychotic Signs/Symptoms  Goal: Improved Behavioral Control (Psychotic Signs/Symptoms)  Outcome: Progressing  Intervention: Manage Behavior  Recent Flowsheet Documentation  Taken 11/4/2024 1700 by Tamie Fraser RN  De-Escalation Techniques: verbally redirected     Problem: Anxiety  Goal: Anxiety Reduction or Resolution  Outcome: Progressing   Goal Outcome Evaluation:    Pt was visible in the milieu intermittently watching TV with select peers. He was observed pacing the marmolejo interacting with staff. Pt denied pain or discomfort. Denies all mental health and psych symptoms.General appearance unkempt. Disoriented to situation. Mood flight of ideas labile. Food and fluid intake adequate. General appearance is unkempt. PT is compliant with medications. He was a little suspicious when writer gave  him his medication but he took anyway. No safety concerns this shift. Pt SIO for day shift discontinue but on  I:I for evenings   and nights.  No behavior escalation noted.   Hamida Angel  (RN)  2018 16:58:24

## 2024-11-05 NOTE — PLAN OF CARE
Team Note Due:  Monday    Assessment/Intervention/Current Symtoms and Care Coordination:  Chart review and met with team, discussed pt progress, symptomology, and response to treatment.  Discussed the discharge plan and any potential impediments to discharge.    Per UR, writer reached out to Clifford asking when pt will be off [employer] [place] insurance plan. Will update Flora in UR when this is clarified.    Discharge Plan or Goal:  Memory care facility     Barriers to Discharge:  Patient requires further psychiatric stabilization due to current symptomology, medication management with changes subject to provider, coordination with outside supports, and aftercare planning. Pt is under civil commitment.     Referral Status:  None at this time     Legal Status:  MI Commitment with [facility]  File Number: [id number]  Start and expiration date of commitment: 10/24/24 - 04/24/25    [facility] meds: Haldol, Clozaril, Risperdal, Invega, Zyprexa, Seroquel, Abilify    PPS:  Shelby Chowdary: [phone]  [email or web address].    Contacts:  Maria C Aaron (Daughter): [phone]   Clifford Aaron (ex-wife): [phone]      Upcoming Meetings and Dates/Important Information and next steps:  Send updated DA with new diagnosis to Shelby  Follow up with family regarding list of Memory Care facilities  Discharge planning when appropriate  Pt will need to apply for MA with financial counselors before a [employer] Assessment/SMRT can be requested for waivers.    Provisional Discharge and Change of Status needed at discharge

## 2024-11-05 NOTE — PLAN OF CARE
BEH IP Unit Acuity Rating Score (UARS)  Patient is given one point for every criteria they meet.    CRITERIA SCORING   On a 72 hour hold, court hold, committed, stay of commitment, or revocation. 1    Patient LOS on BEH unit exceeds 20 days. 1  LOS: 24   Patient under guardianship, 55+, otherwise medically complex, or under age 11. 1   Suicide ideation without relief of precipitating factors. 0   Current plan for suicide. 0   Current plan for homicide. 0   Imminent risk or actual attempt to seriously harm another without relief of factors precipitating the attempt. 1   Severe dysfunction in daily living (ex: complete neglect for self care, extreme disruption in vegetative function, extreme deterioration in social interactions). 1   Recent (last 7 days) or current physical aggression in the ED or on unit. 0   Restraints or seclusion episode in past 72 hours. 0   Recent (last 7 days) or current verbal aggression, agitation, yelling, etc., while in the ED or unit. 0   Active psychosis. 1   Need for constant or near constant redirection (from leaving, from others, etc).  0   Intrusive or disruptive behaviors. 0   Patient requires 3 or more hours of individualized nursing care per 8-hour shift (i.e. for ADLs, meds, therapeutic interventions). 1   TOTAL 7

## 2024-11-05 NOTE — PLAN OF CARE
"  Problem: Adult Behavioral Health Plan of Care  Goal: Adheres to Safety Considerations for Self and Others  Outcome: Progressing  Intervention: Develop and Maintain Individualized Safety Plan  Recent Flowsheet Documentation  Taken 11/5/2024 1200 by Rocío Curry RN  Safety Measures: environmental rounds completed  Goal: Absence of New-Onset Illness or Injury  Outcome: Progressing  Intervention: Identify and Manage Fall Risk  Recent Flowsheet Documentation  Taken 11/5/2024 1200 by Rocío Curry RN  Safety Measures: environmental rounds completed     Problem: Depression  Goal: Improved Mood  Outcome: Progressing     Problem: Anxiety  Goal: Anxiety Reduction or Resolution  Outcome: Progressing     Problem: Sleep Disturbance  Goal: Adequate Sleep/Rest  Outcome: Progressing   Goal Outcome Evaluation:    Plan of Care Reviewed With: patient    Pt is  visible in the milieu. Comes out for meals. Sits by the lounge and TV area. Watches TV and doses off, on and off. At one time, he appeared to be waking up from a dream and was talking and showing this staff \"something heavy\". Pt is eating and drinking adequately. He is pleasant and cooperative, medication compliant. Pt was observed bringing his paper bag of his belongings and was asking this staff to go to intake room and get his belongings so he can get out. Pt continues to have poor insight . Pt was able to participate and join 1 OT group this shift.     Pt is on SIO for evening and nights for severe intrusiveness.   "

## 2024-11-05 NOTE — PROGRESS NOTES
"  ----------------------------------------------------------------------------------------------------------  [facility]  Psychiatry Progress Note  Hospital Day #24     Interim History:     The patient's care was discussed with the treatment team and chart notes were reviewed.    Vitals: VSS (BP was 109/73)   Sleep: 6.25 hours (11/05/24 0600)  Scheduled medications: Took all scheduled medications as prescribed  Psychiatric PRN medications: Trazodone 25 mg x1     Staff Report:   - Pleasant and cooperative with staff   - Intermittently in the milieu  - Reported back pain when he gets out of bed, but quickly resolves afterward  - Compliant with medications   - No behavior escalations noted following removal of SIO for day shift     Please see staff notes for details.      Subjective:     Patient Interview:  Estevan Aaron was interviewed in the milieu. He was participating in an art activity when approached by the team. 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 Told the team that he was in [facility]. 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     Denies SI/HI/AH/VH. Reports improvement in his back pain after receiving medication. He expressed concern about feeling \"drugged\" from his medications. The team informed him about continuing to decrease his dose of olanzapine at bedtime, and he seemed agreeable.     ROS:  Negative except for above.      Objective:     Vitals:  /73   Pulse 54   Temp 97.6  F (36.4  C) (Oral)   Resp 18   Wt 105.3 kg (232 lb 1.6 oz)   SpO2 95%   BMI 37.46 kg/m      Allergies:  No Known Allergies    Current Medications:  Scheduled:  Current Facility-Administered Medications   Medication Dose Route Frequency Provider Last Rate Last Admin    acetaminophen (TYLENOL) tablet 650 mg  650 mg Oral Q4H PRN Nicki, " MD Nikki   650 mg at 11/04/24 0141    alum & mag hydroxide-simethicone (MAALOX) suspension 30 mL  30 mL Oral Q4H PRN Nikki Caceres MD   30 mL at 10/17/24 0837    amLODIPine (NORVASC) tablet 10 mg  10 mg Oral Daily Presley Barrera MD   10 mg at 11/04/24 0856    atorvastatin (LIPITOR) tablet 40 mg  40 mg Oral Daily Nikki Caceres MD   40 mg at 11/04/24 0856    cholecalciferol (VITAMIN D3) capsule 1,250 mcg  1,250 mcg Oral Q7 Days Evelia Grimm DO   1,250 mcg at 10/22/24 1124    cinacalcet (SENSIPAR) tablet 30 mg  30 mg Oral Daily Presley Barrera MD   30 mg at 11/04/24 0856    cloNIDine (CATAPRES) tablet 0.1 mg  0.1 mg Oral BID Presley Barrera MD   0.1 mg at 11/04/24 1932    furosemide (LASIX) tablet 40 mg  40 mg Oral Daily Presley Barrera MD   40 mg at 11/04/24 0856    gabapentin (NEURONTIN) capsule 100 mg  100 mg Oral Q6H PRN Nikki Caceres MD        lisinopril (ZESTRIL) tablet 40 mg  40 mg Oral Daily Presley Barrera MD   40 mg at 11/04/24 0856    melatonin tablet 3 mg  3 mg Oral At Bedtime Presley Barrera MD   3 mg at 11/04/24 1930    metoprolol succinate ER (TOPROL XL) 24 hr tablet 50 mg  50 mg Oral Daily Presley Barrera MD   50 mg at 11/04/24 0856    nicotine (NICODERM CQ) 14 MG/24HR 24 hr patch 1 patch  1 patch Transdermal Daily Evelia Grimm DO   1 patch at 11/04/24 0907    nicotine (NICODERM CQ) 21 MG/24HR 24 hr patch 1 patch  1 patch Transdermal Daily PRN Britt Kang MD   1 patch at 10/13/24 1611    nicotine (NICORETTE) gum 2 mg  2 mg Buccal Q1H PRN González Garcia MD   2 mg at 10/24/24 1254    OLANZapine (zyPREXA) tablet 5 mg  5 mg Oral TID PRN Evelia Grimm DO   5 mg at 10/24/24 1101    Or    OLANZapine (zyPREXA) injection 5 mg  5 mg Intramuscular TID PRN Evelia Grimm,         OLANZapine zydis (zyPREXA) ODT half-tab 2.5 mg  2.5 mg Oral QAM Presley Barrera MD   2.5 mg at 11/04/24 0856    OLANZapine zydis (zyPREXA) ODT half-tab 7.5 mg  7.5 mg Oral At Bedtime González Garcia MD   7.5  mg at 11/04/24 1931    polyethylene glycol (MIRALAX) Packet 17 g  17 g Oral Daily PRN Nikki Caceres MD        traZODone (DESYREL) half-tab 25 mg  25 mg Oral At Bedtime PRN Evelia Grimm DO   25 mg at 11/04/24 0141    Or    traZODone (DESYREL) tablet 50 mg  50 mg Oral At Bedtime PRN Evelia Grimm DO           PRN:  Current Facility-Administered Medications   Medication Dose Route Frequency Provider Last Rate Last Admin    acetaminophen (TYLENOL) tablet 650 mg  650 mg Oral Q4H PRN Nikki Caceres MD   650 mg at 11/04/24 0141    alum & mag hydroxide-simethicone (MAALOX) suspension 30 mL  30 mL Oral Q4H PRN Nikki Caceres MD   30 mL at 10/17/24 0837    amLODIPine (NORVASC) tablet 10 mg  10 mg Oral Daily Presley Barrera MD   10 mg at 11/04/24 0856    atorvastatin (LIPITOR) tablet 40 mg  40 mg Oral Daily Nikki Caceres MD   40 mg at 11/04/24 0856    cholecalciferol (VITAMIN D3) capsule 1,250 mcg  1,250 mcg Oral Q7 Days Evelia Grimm DO   1,250 mcg at 10/22/24 1124    cinacalcet (SENSIPAR) tablet 30 mg  30 mg Oral Daily Presley Barrera MD   30 mg at 11/04/24 0856    cloNIDine (CATAPRES) tablet 0.1 mg  0.1 mg Oral BID Presley Barrera MD   0.1 mg at 11/04/24 1932    furosemide (LASIX) tablet 40 mg  40 mg Oral Daily Presley Barrera MD   40 mg at 11/04/24 0856    gabapentin (NEURONTIN) capsule 100 mg  100 mg Oral Q6H PRN Nikki Caceres MD        lisinopril (ZESTRIL) tablet 40 mg  40 mg Oral Daily Presley Barrera MD   40 mg at 11/04/24 0856    melatonin tablet 3 mg  3 mg Oral At Bedtime Presley Barrera MD   3 mg at 11/04/24 1930    metoprolol succinate ER (TOPROL XL) 24 hr tablet 50 mg  50 mg Oral Daily Presley Barrera MD   50 mg at 11/04/24 0856    nicotine (NICODERM CQ) 14 MG/24HR 24 hr patch 1 patch  1 patch Transdermal Daily Evelia Grimm DO   1 patch at 11/04/24 0907    nicotine (NICODERM CQ) 21 MG/24HR 24 hr patch 1 patch  1 patch Transdermal Daily PRN Britt Kang MD   1 patch at 10/13/24 7969     nicotine (NICORETTE) gum 2 mg  2 mg Buccal Q1H PRN González Garcia MD   2 mg at 10/24/24 1254    OLANZapine (zyPREXA) tablet 5 mg  5 mg Oral TID PRN Evelia Grimm DO   5 mg at 10/24/24 1101    Or    OLANZapine (zyPREXA) injection 5 mg  5 mg Intramuscular TID PRN Evelia Grimm DO        OLANZapine zydis (zyPREXA) ODT half-tab 2.5 mg  2.5 mg Oral QAM Presley Barrera MD   2.5 mg at 11/04/24 0856    OLANZapine zydis (zyPREXA) ODT half-tab 7.5 mg  7.5 mg Oral At Bedtime González Garcia MD   7.5 mg at 11/04/24 1931    polyethylene glycol (MIRALAX) Packet 17 g  17 g Oral Daily PRN Nikki Caceres MD        traZODone (DESYREL) half-tab 25 mg  25 mg Oral At Bedtime PRN Evelia Grimm DO   25 mg at 11/04/24 0141    Or    traZODone (DESYREL) tablet 50 mg  50 mg Oral At Bedtime PRN Evelia Grimm DO           Labs and Imaging:    Data this admission:  - CBC unremarkable  - CMP unremarkable  - TSH normal  - UDS negative  - Vit D low  - Hgb A1c 5.9 (10/13/24)  - Lipids unremarkable  - Vit B12 normal  - Folate normal  - Urinalysis unremarkable  - EKG normal sinus rhythm, QTc 390  - Head CT showed no acute changes  - HIV non reactive  - Treponema antibody non reactive  - ESR wnl   - Ceruloplasmin wnl   - BOOGIE negative  - Lyme negative      Parathyroid hormone:   10/13: 85     Calcium:  10/14: 11.4  10/16: 10.3  10/17: 10.3  10/19: 9.8  10/21: 10.6  10/23: 10.3     Albumin:  10/14: 4.3  10/16: 4.3  10/17: 4.1  10/19: 4.2  10/21: 4.5  10/23: 4.3      MRI:   IMPRESSION:  1. No acute intracranial pathology.  2. No focal lesion or structural abnormality.   3. Symmetric frontoparietal cortical volume loss slightly more than  expected for age.     Mental Status Exam:     Oriented to:  Oriented to person and building. Not oriented to the month or city.   General:  Awake and Confused  Appearance:  appears stated age and Dressed in hospital scrubs  Behavior/Attitude:  Calm, Cooperative, and Indifferent  Eye Contact: Appropriate  Psychomotor:  "No evidence of tics, dystonia, or tardive dyskinesia  no catatonia present  Speech:  appropriate volume/tone  Language: Fluent in English with appropriate syntax and vocabulary.  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   Associations:  loose  Insight:  impaired   Judgment:  partial   Impulse control: limited  Attention Span:  decreased  Concentration:  distractible  Recent and Remote Memory:  recent memory impaired, remote memory intact  Fund of Knowledge: average  Muscle Strength and Tone: normal  Gait and Station: Normal     Psychiatric Assessment     Estevan Aaron is a 64 year old male with previous psychiatric diagnoses of GEORGINA admitted from the ER on 10/12/2024 due to concern for HI and psychosis in the context of medical issues (hyperthyroidism, hypercalcemia), psychosocial stressors including with recent divorce and selling his home. This is the patient's first psychiatric hospitalization. Significant symptoms on admission included delusions of persecution with grandiose beliefs, homicidal ideations, as well as disorganized thinking and behavior. The MSE on admission was pertinent for a thought process which was perseverative, circumstantial, tangential, disorganized and tangential; with rambling and looseness of associations,. Psychological contributions to presentation included lack of insight. Social factors contributing to presentation include isolation, recent divorce, selling his house, and moving from hotel to hotel. Biological contributions to presentation included a history of hyperparathyroidism with chronic hypercalcemia per charts as well as a history of methamphetamine use per collateral from ex-wife.     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 Per collateral from ex-wife, Santos has had paranoid ideations since they first met. He has always felt like people are out to get him or trying to rip him off. She says that he has had visual hallucinations (he will point things out that the rest of the family can't see) for a long time, but the family would just go along with him to avoid making him angry. Things began to worsen around the beginning of the COVID pandemic, when Santos became more isolated and the family started to notice cognitive issues such as impaired memory. Immediately prior to this hospitalization, the ex-wife found Santos in a hotel room with a generator full of gasoline, binoculars, and hunting equipment. This time course does not suggest acute psychosis, however given the patient has never had a full psychiatric work-up, it is reasonable to obtain a first-episode psychosis work-up.      Other things that could be contributing to his presentation is hyperparathyroidism with hypercalcemia. However, patient's calcium returned to normal limits on 10/16, and patient continues to have psychosis so likely not a significant contributing factor to patient's presentation.      Patient also continues to be disoriented and his behaviors appear to worsen in the evenings. This raises concern for underlying neurocognitive disorder with delirium. Patient received MRI brain with and without contrast which showed frontal and parietal atrophy greater than would be expected for patient's age. This is consistent with neurocognitive disorder. Therefore, will attempt to lower patient's antipsychotic and see if he begins to improve. Patient will likely require long-term memory care.      Given that he currently has  psychosis, patient warrants inpatient psychiatric hospitalization to maintain his safety.     Psychiatric Plan by Diagnosis      Today's changes:  - Decreased olanzapine to 5 mg PO at bedtime      # Bipolar I Disorder, current manic episode with psychotic features    1. Medications:  - Discontinued PTA fluoxetine 40 mg, consider restarting at a later time   - Olanzapine 2.5 mg during day and 5 mg at bedtime     2. Pertinent Labs/Monitoring:   - See above     3. Additional Plans:  - Patient will be treated in therapeutic milieu with appropriate individual and group therapies as described     # Unspecified anxiety vs Generalized Anxiety Disorder  - Monitor for symptoms.  - Fluoxetine held due to suspicion of ongoing manic symptoms     Psychiatric Hospital Course:      Estevan Aaron was admitted to [facility] on a 72 hour hold.   Medications:  PTA fluoxetine was held due to concern for worsening of zelalem   New medications started at the time of admission include Zyprexa.   Increased olanzapine 10 mg at bedtime was to 10 mg BID (10/14)   Increased olanzapine 10 mg BID to 10 mg during day and 15 mg at bedtime (10/15)  10/21: increased Zyprexa pm dose from 15 to 20 mg  10/23: started melatonin 3 mg at 7 pm to help patient with circadian rhythm as he has been staying up throughout the night and sleeping a lot during the day and there is some concern for delirium   10/24: consulted anesthesia for MRI brain   10/28: MRI brain complete, decrease AM zyprexa from 10 to 5 mg  10/29: Morning Zyprexa decreased to 2.5 mg, evening Zyprexa decreased to 15 mg   11/1: Decreased evening Zyprexa to 10 mg   11/4: Decreased evening Zyprexa to 7.5 mg   11/5: Decreased evening Zyprexa to 5 mg      Care conference 10/18/24 with daughter Maria C and ex-wife Clifford  - Report that patient has always been paranoid since ex-wife first met him. She describes him always feeling like he is getting ripped off.   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 They reported once incidence in which he made a bid on a job and the client paid him, but he never ended up finishing the job  - Wife and him  officially this year, and since selling the house several months ago, patient has been moving from hotel to hotel due to thinking people are out to get him   - When they were selling their house, patient threatened realtors and felt he was being ripped off   - Clifford reports that she started to be afraid to be around him alone  - When he was found in the hotel, he had a generator full of gasoline, binoculars, bullets, and hunting equipment.     10/21/24: updated daughter on Santos's treatment plan      10/23/24: updated daughter on Santos's treatment plan      10/25/24: updated daughter on Santos's treatment plan, including plan to try and get MRI brain done under sedation. She said that Santos's grandfather had dementia and his sister has a brain tumor.      10/28/24: updated daughter on Santos's MRI results. She is planning on coming into town soon.      Care conference 11/1/24 with daughters Maria C and Estefani and ex-wife Clifford  - Discussed dementia diagnosis   - Clifford asked about plans moving forward. Explained that applying for medical assistance is the first step. Explained that application processing time can be very variable. Informed family that Santos will most likely be staying on this inpatient unit during this time.   - Clifford expressed that their family would like to be the power of /have conservatorship for Santos.   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 Reports that he has bills that he has not paid.   - Maria C is planning to move to [place], and Clifford currently lives in [place]. Family prefer that Santos would in a facility that are near these locations. Discussed with family that they can also try to request a specific location (memory care).   - Clifford reports that he had previously gotten help applying for his social security and medicare, but unsure if it had been approved.   - Informed family that there is a geriatric unit and there might be a transfer if there becomes availability on this unit.   - Family shared that he was completely different just two months ago.   - Estefani shared that his family found his medications in his old truck recently, expressed that it was likely that he was not taking his medications prior to his hospitalization.      The risks, benefits, alternatives, and side effects were discussed and understood by the patient and other caregivers.     Medical Assessment and Plan     Medical diagnoses to be addressed this admission:    # Hypertension  - Continue PTA medications  Furosemide 40 mg daily  Lisinopril 40 mg daily  Metoprolol 50 mg daily  Amlodipine 10 mg daily  Clonidine 0.1 mg BID     # Hyperlipidemia  - Continue PTA Atorvastatin 40 mg     # Hyperparathyroidism  # Hypercalcemia, hypophosphoremia   Increased Ca level to 10.9 and decreased Ph level to 2.3 in the ED. Suspicion patient's hypercalcemia could be contributing to symptoms of psychosis.  - Consulted endocrinology, who started cinacalcet 30 mg BID on 10/13  - 10/16 endocrinology recommended continuing to trend calcium and albumin to make sure patient does not become hypocalcemic and recommended holding cinacalcet   - 10/17, calcium and albumin wnl, endo recommended cinacalcet 30 mg once daily   - 10/21, per endo continue cincaclcet and recheck calcium and albumin on October 24  -  10/23: per endo, patient needs to follow up outpatient for further management of hyperparathyroidism      Medical course: Patient was physically examined by the ED prior to being transferred to the unit and was found to be medically stable and appropriate for admission.      Consults: Psychiatry, Endocrinology (follow-up of hyperthyroidism / hypercalcemia and hypertension)     Checklist     Legal Status: Committed     Safety Assessment:   Behavioral Orders   Procedures    Assault precautions    Code 1 - Restrict to Unit    Routine Programming     As clinically indicated    Status 15     Every 15 minutes.    Status Individual Observation     1:1 during evening shift, & night shift.    If patient refuses overnight presence of staff in his room, it's ok to do 15 min checks through the window blinds.    Patient SIO status reviewed with team/RN.  Please also refer to RN/team documentation for add'l detail.    -SIO staff to monitor following which have contributed to patient being on SIO:  Pt has psychosis regarding being followed and attacked, very paranoid, HI    -Possible interventions SIO staff could use to support patient's treatment progress:  Redirect and reassure  -When following observed, team will review discontinuation of SIO:  Psychosis improves     Order Specific Question:   CONTINUOUS 24 hours / day     Answer:   Other     Order Specific Question:   Specify distance     Answer:   10 feet, 5 feet when close to the doors     Order Specific Question:   Indications for SIO     Answer:   Assault risk     Order Specific Question:   Indications for SIO     Answer:   Severe intrusiveness       Risk Assessment:  Risk for harm is elevated.  Risk factors: impulsive and past behaviors  Protective factors: family     SIO: 1:1 during evening and night shift     Disposition: Pending stabilization, medication optimization, & development of a safe discharge plan.      Attestations     This patient was seen and discussed with  my resident and attending physician.   Qi Jean, MS3  [facility] Medical Student    Resident/Fellow Attestation   I, González Garcia MD, was present with the medical/ALLEN student who participated in the service and in the documentation of the note.  I have verified the history and personally performed the physical exam and medical decision making.  I agree with the assessment and plan of care as documented in the note.    González Garcia MD  PGY2  Date of Service (when I saw the patient): 11/05/24    Attestation:  This patient has been seen and evaluated by me, Pete Smith MD.  I have discussed this patient with the house staff team including the resident and/or medical student and I agree with the findings and plan in this note.    I have reviewed today's vital signs, medications, labs and imaging. Pete Smith MD , PhD.

## 2024-11-05 NOTE — PLAN OF CARE
Problem: Sleep Disturbance  Goal: Adequate Sleep/Rest  Outcome: Progressing      Pt appears to have slept for  6.25 hours. Pt denies pain and no PRN medications were given. Pt denies anxiety, depression, and SI/SIB. Pt is on 1-1 SIO for assault risk and severe intrusiveness for night shift. 15 minutes safety checks were in place. Staff will continue to offer support to pt.

## 2024-11-06 ENCOUNTER — TELEPHONE (OUTPATIENT)
Dept: BEHAVIORAL HEALTH | Facility: CLINIC | Age: 64
End: 2024-11-06
Payer: COMMERCIAL

## 2024-11-06 PROCEDURE — 250N000013 HC RX MED GY IP 250 OP 250 PS 637

## 2024-11-06 PROCEDURE — 124N000002 HC R&B MH UMMC

## 2024-11-06 PROCEDURE — 99232 SBSQ HOSP IP/OBS MODERATE 35: CPT | Mod: GC | Performed by: PSYCHIATRY & NEUROLOGY

## 2024-11-06 RX ADMIN — CINACALCET 30 MG: 30 TABLET ORAL at 08:03

## 2024-11-06 RX ADMIN — ATORVASTATIN CALCIUM 40 MG: 20 TABLET, FILM COATED ORAL at 08:03

## 2024-11-06 RX ADMIN — OLANZAPINE 2.5 MG: 5 TABLET, ORALLY DISINTEGRATING ORAL at 08:03

## 2024-11-06 RX ADMIN — OLANZAPINE 5 MG: 5 TABLET, ORALLY DISINTEGRATING ORAL at 19:05

## 2024-11-06 RX ADMIN — CLONIDINE HYDROCHLORIDE 0.1 MG: 0.1 TABLET ORAL at 08:04

## 2024-11-06 RX ADMIN — METOPROLOL SUCCINATE 50 MG: 50 TABLET, EXTENDED RELEASE ORAL at 08:04

## 2024-11-06 RX ADMIN — CLONIDINE HYDROCHLORIDE 0.1 MG: 0.1 TABLET ORAL at 19:04

## 2024-11-06 RX ADMIN — FUROSEMIDE 40 MG: 40 TABLET ORAL at 08:04

## 2024-11-06 RX ADMIN — Medication 3 MG: at 19:04

## 2024-11-06 RX ADMIN — LISINOPRIL 40 MG: 10 TABLET ORAL at 08:04

## 2024-11-06 RX ADMIN — Medication 1 PATCH: at 08:09

## 2024-11-06 RX ADMIN — AMLODIPINE BESYLATE 10 MG: 5 TABLET ORAL at 08:03

## 2024-11-06 ASSESSMENT — ACTIVITIES OF DAILY LIVING (ADL)
ADLS_ACUITY_SCORE: 0
HYGIENE/GROOMING: INDEPENDENT;PROMPTS
ADLS_ACUITY_SCORE: 0
DRESS: INDEPENDENT;SCRUBS (BEHAVIORAL HEALTH)
HYGIENE/GROOMING: INDEPENDENT;PROMPTS
ADLS_ACUITY_SCORE: 0
ORAL_HYGIENE: INDEPENDENT
ORAL_HYGIENE: INDEPENDENT
ADLS_ACUITY_SCORE: 0
DRESS: INDEPENDENT;SCRUBS (BEHAVIORAL HEALTH)
ADLS_ACUITY_SCORE: 0
ADLS_ACUITY_SCORE: 0

## 2024-11-06 NOTE — PROGRESS NOTES
"  ----------------------------------------------------------------------------------------------------------  [facility]  Psychiatry Progress Note  Hospital Day #25     Interim History:     The patient's care was discussed with the treatment team and chart notes were reviewed.    Vitals: VSS, intermittently bradycardic to 50s  Sleep: 3.75 h  Scheduled medications: Took all scheduled medications as prescribed  Psychiatric PRN medications: None     Staff Report:   -Visible in the milieu, often sleeping   -Family visited, went well   -Continues to ask to go home    Please see staff notes for details.      Subjective:     Patient Interview:  Estevan \"Santos\" Agnieszka was interviewed in his room. Reports that the visit from his family was good. Shares details about his family. Reports that he is \"hanging around and talking today.\" Tells the team that he slept \"here\" yesterday. Shares that he is getting up early to look at the eagles that dive and catch fish outside his window.     Tells the team that he wants chocolate cookies and shares that his favorite candy is watermelon. Shares that he does not know what he will do tomorrow. Orientated to his age. Expressed interest in the art group activities, currently working on a bird project.     ROS:  Negative except for above.      Objective:     Vitals:  /79   Pulse 54   Temp 98.3  F (36.8  C) (Oral)   Resp 18   Wt 105.4 kg (232 lb 4.8 oz)   SpO2 95%   BMI 37.49 kg/m      Allergies:  No Known Allergies    Current Medications:  Scheduled:  Current Facility-Administered Medications   Medication Dose Route Frequency Provider Last Rate Last Admin    acetaminophen (TYLENOL) tablet 650 mg  650 mg Oral Q4H PRN Nikki Caceres MD   650 mg at 11/05/24 0905    alum & mag hydroxide-simethicone (MAALOX) suspension 30 mL  30 mL Oral Q4H PRN Nikki Caceres MD   30 mL at 10/17/24 0837    amLODIPine (NORVASC) tablet 10 mg  10 mg " Oral Daily Presley Barrera MD   10 mg at 11/05/24 0850    atorvastatin (LIPITOR) tablet 40 mg  40 mg Oral Daily Nikki Caceres MD   40 mg at 11/05/24 0850    cholecalciferol (VITAMIN D3) capsule 1,250 mcg  1,250 mcg Oral Q7 Days Evelia Grimm DO   1,250 mcg at 11/05/24 1234    cinacalcet (SENSIPAR) tablet 30 mg  30 mg Oral Daily Presley Barrera MD   30 mg at 11/05/24 0923    cloNIDine (CATAPRES) tablet 0.1 mg  0.1 mg Oral BID Presley Barrera MD   0.1 mg at 11/05/24 1948    furosemide (LASIX) tablet 40 mg  40 mg Oral Daily Presley Barrera MD   40 mg at 11/05/24 0910    gabapentin (NEURONTIN) capsule 100 mg  100 mg Oral Q6H PRN Nikki Caceres MD        lisinopril (ZESTRIL) tablet 40 mg  40 mg Oral Daily Presley Barrera MD   40 mg at 11/05/24 0850    melatonin tablet 3 mg  3 mg Oral At Bedtime Presley Barrera MD   3 mg at 11/05/24 1948    metoprolol succinate ER (TOPROL XL) 24 hr tablet 50 mg  50 mg Oral Daily Presley Barrera MD   50 mg at 11/05/24 0904    nicotine (NICODERM CQ) 14 MG/24HR 24 hr patch 1 patch  1 patch Transdermal Daily Evelia Grimm DO   1 patch at 11/05/24 0903    nicotine (NICODERM CQ) 21 MG/24HR 24 hr patch 1 patch  1 patch Transdermal Daily PRN Britt Kang MD   1 patch at 10/13/24 1611    nicotine (NICORETTE) gum 2 mg  2 mg Buccal Q1H PRN González Garcia MD   2 mg at 10/24/24 1254    OLANZapine (zyPREXA) tablet 5 mg  5 mg Oral TID PRN Evelia Grimm DO   5 mg at 10/24/24 1101    Or    OLANZapine (zyPREXA) injection 5 mg  5 mg Intramuscular TID PRN Evelia Grimm DO        OLANZapine zydis (zyPREXA) ODT half-tab 2.5 mg  2.5 mg Oral QAM Presley Barrera MD   2.5 mg at 11/05/24 0850    OLANZapine zydis (zyPREXA) ODT tab 5 mg  5 mg Oral At Bedtime González Garcia MD   5 mg at 11/05/24 1948    polyethylene glycol (MIRALAX) Packet 17 g  17 g Oral Daily PRN Nikki Caceres MD        traZODone (DESYREL) half-tab 25 mg  25 mg Oral At Bedtime PRN Evelia Grimm DO   25 mg at 11/04/24 0141    Or     traZODone (DESYREL) tablet 50 mg  50 mg Oral At Bedtime PRN Evelia Grimm DO           PRN:  Current Facility-Administered Medications   Medication Dose Route Frequency Provider Last Rate Last Admin    acetaminophen (TYLENOL) tablet 650 mg  650 mg Oral Q4H PRN Nikki Caceres MD   650 mg at 11/05/24 0905    alum & mag hydroxide-simethicone (MAALOX) suspension 30 mL  30 mL Oral Q4H PRN Nikki Caceres MD   30 mL at 10/17/24 0837    amLODIPine (NORVASC) tablet 10 mg  10 mg Oral Daily Presley Barrera MD   10 mg at 11/05/24 0850    atorvastatin (LIPITOR) tablet 40 mg  40 mg Oral Daily Nikki Caceres MD   40 mg at 11/05/24 0850    cholecalciferol (VITAMIN D3) capsule 1,250 mcg  1,250 mcg Oral Q7 Days Evelia Grimm DO   1,250 mcg at 11/05/24 1234    cinacalcet (SENSIPAR) tablet 30 mg  30 mg Oral Daily Presley Barrera MD   30 mg at 11/05/24 0923    cloNIDine (CATAPRES) tablet 0.1 mg  0.1 mg Oral BID Presley Barrera MD   0.1 mg at 11/05/24 1948    furosemide (LASIX) tablet 40 mg  40 mg Oral Daily Presley Barrera MD   40 mg at 11/05/24 0910    gabapentin (NEURONTIN) capsule 100 mg  100 mg Oral Q6H PRN Nikki Caceres MD        lisinopril (ZESTRIL) tablet 40 mg  40 mg Oral Daily Presley Barrera MD   40 mg at 11/05/24 0850    melatonin tablet 3 mg  3 mg Oral At Bedtime Presley Barrera MD   3 mg at 11/05/24 1948    metoprolol succinate ER (TOPROL XL) 24 hr tablet 50 mg  50 mg Oral Daily Presley Barrera MD   50 mg at 11/05/24 0904    nicotine (NICODERM CQ) 14 MG/24HR 24 hr patch 1 patch  1 patch Transdermal Daily Evelia Grimm DO   1 patch at 11/05/24 0903    nicotine (NICODERM CQ) 21 MG/24HR 24 hr patch 1 patch  1 patch Transdermal Daily PRN Britt Kang MD   1 patch at 10/13/24 1611    nicotine (NICORETTE) gum 2 mg  2 mg Buccal Q1H PRN González Garcia MD   2 mg at 10/24/24 1254    OLANZapine (zyPREXA) tablet 5 mg  5 mg Oral TID PRN Evelia Grimm DO   5 mg at 10/24/24 1101    Or    OLANZapine (zyPREXA)  "injection 5 mg  5 mg Intramuscular TID PRN Evelia Grimm DO        OLANZapine zydis (zyPREXA) ODT half-tab 2.5 mg  2.5 mg Oral QAM Presley Barrera MD   2.5 mg at 11/05/24 0850    OLANZapine zydis (zyPREXA) ODT tab 5 mg  5 mg Oral At Bedtime González Garcia MD   5 mg at 11/05/24 1948    polyethylene glycol (MIRALAX) Packet 17 g  17 g Oral Daily PRN Nikki Caceres MD        traZODone (DESYREL) half-tab 25 mg  25 mg Oral At Bedtime PRN Evelia Grimm DO   25 mg at 11/04/24 0141    Or    traZODone (DESYREL) tablet 50 mg  50 mg Oral At Bedtime PRN Evelia Grimm DO           Labs and Imaging:    Data this admission:  - CBC unremarkable  - CMP unremarkable  - TSH normal  - UDS negative  - Vit D low  - Hgb A1c 5.9 (10/13/24)  - Lipids unremarkable  - Vit B12 normal  - Folate normal  - Urinalysis unremarkable  - EKG normal sinus rhythm, QTc 390  - Head CT showed no acute changes  - HIV non reactive  - Treponema antibody non reactive  - ESR wnl   - Ceruloplasmin wnl   - BOOGIE negative  - Lyme negative      Parathyroid hormone:   10/13: 85     Calcium:  10/14: 11.4  10/16: 10.3  10/17: 10.3  10/19: 9.8  10/21: 10.6  10/23: 10.3     Albumin:  10/14: 4.3  10/16: 4.3  10/17: 4.1  10/19: 4.2  10/21: 4.5  10/23: 4.3      MRI:   IMPRESSION:  1. No acute intracranial pathology.  2. No focal lesion or structural abnormality.   3. Symmetric frontoparietal cortical volume loss slightly more than  expected for age.     Mental Status Exam:     Oriented to:  Oriented to self  General:  Awake and Confused  Appearance:  appears stated age and Dressed in hospital scrubs  Behavior/Attitude:  Calm, Cooperative, and Indifferent  Eye Contact: Appropriate  Psychomotor: No evidence of tics, dystonia, or tardive dyskinesia  no catatonia present  Speech:  appropriate volume/tone  Language: Fluent in English with appropriate syntax and vocabulary.  Mood:  \"good\"  Affect:  congruent with mood  Thought Process:  looseness of association, disorganized, " and confused  Thought Content:   Did not assess SI/HI/AH/VH today; no apparent delusions  Associations:  loose  Insight:  impaired   Judgment:  partial   Impulse control: limited  Attention Span:  decreased  Concentration:  distractible  Recent and Remote Memory:  recent memory impaired, remote memory intact  Fund of Knowledge: average  Muscle Strength and Tone: normal  Gait and Station: Normal     Psychiatric Assessment     Estevan Aaron is a 64 year old male with previous psychiatric diagnoses of GEORGINA admitted from the ER on 10/12/2024 due to concern for HI and psychosis in the context of medical issues (hyperthyroidism, hypercalcemia), psychosocial stressors including with recent divorce and selling his home. This is the patient's first psychiatric hospitalization. Significant symptoms on admission included delusions of persecution with grandiose beliefs, homicidal ideations, as well as disorganized thinking and behavior. The MSE on admission was pertinent for a thought process which was perseverative, circumstantial, tangential, disorganized and tangential; with rambling and looseness of associations. Psychological contributions to presentation included lack of insight. Social factors contributing to presentation included isolation, recent divorce, selling his house, and moving from hotel to hotel. Biological contributions to presentation included a history of hyperparathyroidism with chronic hypercalcemia per charts as well as a history of methamphetamine use per collateral from ex-wife.     History was difficult to obtain due to the patient's severe disorganized thinking on interview; he was observed with persecutory delusions pertaining to the realtor and gang members, disorganized behavior as these delusions have led him to flee to different hotels to stay safe/ has called  complaining of being targeted and auditory hallucinations of voices for the past 12 months. Per collateral from ex-wife, Santos has had  paranoid ideations since they first met. He has always felt like people are out to get him or trying to rip him off. She says that he has had visual hallucinations (he will point things out that the rest of the family can't see) for a long time, but the family would just go along with him to avoid making him angry. This timeline and presentation could be consistent with diagnosis of paranoid personality disorder. Things began to worsen around the beginning of the COVID pandemic, when Santos became more isolated and the family started to notice cognitive issues such as impaired memory. Immediately prior to this hospitalization, the ex-wife found Santos in a hotel room with a generator full of gasoline, binoculars, and hunting equipment. This time course does not suggest acute psychosis, however given the patient has never had a full psychiatric work-up, we completed a first-episode psychosis work-up.      Other things that could be contributing to his presentation is hyperparathyroidism with hypercalcemia. However, patient's calcium returned to normal limits on 10/16, and patient continues to have psychosis so likely not a significant contributing factor to patient's presentation.      Patient also continues to be disoriented and his behaviors appear to worsen in the evenings. This raised concern for underlying neurocognitive disorder with delirium. Patient received MRI brain with and without contrast which showed frontal and parietal atrophy greater than would be expected for patient's age. This is consistent with neurocognitive disorder. Patient has continued to improve with decreasing his antipsychotic. Working to get patient transferred to geriatric psychiatry in the short term and to memory care long term.      Given that he currently has psychosis, patient warrants inpatient psychiatric hospitalization to maintain his safety.     Psychiatric Plan by Diagnosis      Today's changes:  - None     # Major Neurocognitive  Disorder  # Rule out paranoid personality disorder  1. Medications:  - Discontinued PTA fluoxetine 40 mg, consider restarting at a later time   - Olanzapine 2.5 mg during day and 5 mg at bedtime     2. Pertinent Labs/Monitoring:   - See above     3. Additional Plans:  - Patient will be treated in therapeutic milieu with appropriate individual and group therapies as described     # Unspecified anxiety vs Generalized Anxiety Disorder  - Monitor for symptoms.  - Fluoxetine held due to suspicion of ongoing manic symptoms     Psychiatric Hospital Course:      Estevan Aaron was admitted to [facility] on a 72 hour hold.   Medications:  PTA fluoxetine was held due to concern for worsening of zelalem   New medications started at the time of admission include Zyprexa.   Increased olanzapine 10 mg at bedtime was to 10 mg BID (10/14)   Increased olanzapine 10 mg BID to 10 mg during day and 15 mg at bedtime (10/15)  10/21: increased Zyprexa pm dose from 15 to 20 mg  10/23: started melatonin 3 mg at 7 pm to help patient with circadian rhythm as he has been staying up throughout the night and sleeping a lot during the day and there is some concern for delirium   10/24: consulted anesthesia for MRI brain   10/28: MRI brain complete, decrease AM zyprexa from 10 to 5 mg  10/29: Morning Zyprexa decreased to 2.5 mg, evening Zyprexa decreased to 15 mg   11/1: Decreased evening Zyprexa to 10 mg   11/4: Decreased evening Zyprexa to 7.5 mg   11/5: Decreased evening Zyprexa to 5 mg      Care conference 10/18/24 with daughter Maria C and ex-wife Clifford  - Report that patient has always been paranoid since ex-wife first met him. She describes him always feeling like he is getting ripped off.   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 They reported once incidence in which he made a bid on a job and the client paid him, but he never ended up finishing the job  - Wife and him  officially this year, and since selling the house several months ago, patient has been moving from hotel to hotel due to thinking people are out to get him   - When they were selling their house, patient threatened realtors and felt he was being ripped off   - Clifford reports that she started to be afraid to be around him alone  - When he was found in the hotel, he had a generator full of gasoline, binoculars, bullets, and hunting equipment.     10/21/24: updated daughter on Santos's treatment plan      10/23/24: updated daughter on Santos's treatment plan      10/25/24: updated daughter on Santos's treatment plan, including plan to try and get MRI brain done under sedation. She said that Santos's grandfather had dementia and his sister has a brain tumor.      10/28/24: updated daughter on Santos's MRI results. She is planning on coming into town soon.      Care conference 11/1/24 with daughters Maria C and Estefani and ex-wife Clifford  - Discussed dementia diagnosis   - Clifford asked about plans moving forward. Explained that applying for medical assistance is the first step. Explained that application processing time can be very variable. Informed family that Santos will most likely be staying on this inpatient unit during this time.   - Clifford expressed that their family would like to be the power of /have conservatorship for Santos.   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 Reports that he has bills that he has not paid.   - Maria C is planning to move to [place], and Clifford currently lives in  [place]. Family prefer that Santos would in a facility that are near these locations. Discussed with family that they can also try to request a specific location (memory care).   - Clifford reports that he had previously gotten help applying for his social security and medicare, but unsure if it had been approved.   - Informed family that there is a geriatric unit and there might be a transfer if there becomes availability on this unit.   - Family shared that he was completely different just two months ago.   - Estefani shared that his family found his medications in his old truck recently, expressed that it was likely that he was not taking his medications prior to his hospitalization.     11/6/24: updated Maria C on plan to try and transfer Santos to Geriatric unit.      The risks, benefits, alternatives, and side effects were discussed and understood by the patient and other caregivers.     Medical Assessment and Plan     Medical diagnoses to be addressed this admission:    # Hypertension  - Continue PTA medications  Furosemide 40 mg daily  Lisinopril 40 mg daily  Metoprolol 50 mg daily  Amlodipine 10 mg daily  Clonidine 0.1 mg BID     # Hyperlipidemia  - Continue PTA Atorvastatin 40 mg     # Hyperparathyroidism  # Hypercalcemia, hypophosphoremia   Increased Ca level to 10.9 and decreased Ph level to 2.3 in the ED. Suspicion patient's hypercalcemia could be contributing to symptoms of psychosis.  - Consulted endocrinology, who started cinacalcet 30 mg BID on 10/13  - 10/16 endocrinology recommended continuing to trend calcium and albumin to make sure patient does not become hypocalcemic and recommended holding cinacalcet   - 10/17, calcium and albumin wnl, endo recommended cinacalcet 30 mg once daily   - 10/21, per endo continue cincaclcet and recheck calcium and albumin on October 24  - 10/23: per endo, patient needs to follow up outpatient for further management of hyperparathyroidism      Medical course: Patient was  physically examined by the ED prior to being transferred to the unit and was found to be medically stable and appropriate for admission.      Consults: Psychiatry, Endocrinology (follow-up of hyperthyroidism / hypercalcemia and hypertension)     Checklist     Legal Status: Committed     Safety Assessment:   Behavioral Orders   Procedures    Assault precautions    Code 1 - Restrict to Unit    Routine Programming     As clinically indicated    Status 15     Every 15 minutes.    Status Individual Observation     1:1 during evening shift, & night shift.    If patient refuses overnight presence of staff in his room, it's ok to do 15 min checks through the window blinds.    Patient SIO status reviewed with team/RN.  Please also refer to RN/team documentation for add'l detail.    -SIO staff to monitor following which have contributed to patient being on SIO:  Pt has psychosis regarding being followed and attacked, very paranoid, HI    -Possible interventions SIO staff could use to support patient's treatment progress:  Redirect and reassure  -When following observed, team will review discontinuation of SIO:  Psychosis improves     Order Specific Question:   CONTINUOUS 24 hours / day     Answer:   Other     Order Specific Question:   Specify distance     Answer:   10 feet, 5 feet when close to the doors     Order Specific Question:   Indications for SIO     Answer:   Assault risk     Order Specific Question:   Indications for SIO     Answer:   Severe intrusiveness     Risk Assessment:  Risk for harm is elevated.  Risk factors: impulsive and past behaviors  Protective factors: family     SIO: 1:1 during evening and night shift     Disposition: Pending stabilization, medication optimization, & development of a safe discharge plan.      Attestations     This patient was seen and discussed with my attending physician.  Presley Barrera MD  PGY-1 Psychiatry Resident    Attestation:  This patient has been seen and evaluated by me,  Pete Smith MD.  I have discussed this patient with the house staff team including the resident and/or medical student and I agree with the findings and plan in this note.    I have reviewed today's vital signs, medications, labs and imaging. Pete Smith MD , PhD.

## 2024-11-06 NOTE — PLAN OF CARE
Problem: Sleep Disturbance  Goal: Adequate Sleep/Rest  Outcome: Progressing   Goal Outcome Evaluation:    Patient appears to have slept a total of 3.75 hours. Safety/environment checks conducted every 15 minutes with no concerns noted. No complaints of pain/discomfort.      Continues on 1:1 10 ft SIO (5 ft when close to the doors) for assault risk and severe intrusiveness.

## 2024-11-06 NOTE — PLAN OF CARE
Problem: Psychotic Signs/Symptoms  Goal: Improved Behavioral Control (Psychotic Signs/Symptoms)  Outcome: Progressing  Intervention: Manage Behavior  Recent Flowsheet Documentation  Taken 11/6/2024 1621 by Tamie Fraser RN  De-Escalation Techniques: verbally redirected     Problem: Anxiety  Goal: Anxiety Reduction or Resolution  Outcome: Progressing   Goal Outcome Evaluation:    Pt was visible in the lounge watching TV. He did not socialize with peers. Presented with a flat affect .Pleasant upon approach. Mood is calm.Pt was observed pacing the [address] way intermittently with a package in his hand.  Nutrition and hydration adequate.  Denies pain or discomfort. Denies all mental health and psych symptoms. Continuous on SIO for severe intrusiveness and assault risk.

## 2024-11-06 NOTE — PLAN OF CARE
"Pt denies SI.  Pt unsure of what day of week it is.  Pt this am very focused on getting into exam room.  Pt has been in a good mood frandy most of the day.  Pt been to one group this afternoon.  Pt has been eating meals and drinking coffee. Pt reported his mood is \"good.\"  Pt denies SI.  Pt continues to have intermittent confusions.  \"I painting with that women she's lonely.\"  Asking about some bear int he exam room.   Later in shift pt focused on calling his ex wife to bring him a key.  Pt getting upset.  Pt stating wanting his truck and phone.  Have explained to pt many times that his family took his phone.  Pt on phone will ex wife yelling telling her to bring him keys.      Problem: Adult Behavioral Health Plan of Care  Goal: Plan of Care Review  11/6/2024 1324 by Catherine Szymanski RN  Outcome: Not Progressing  11/6/2024 1324 by Catherine Szymanski RN  Outcome: Not Progressing  Goal: Patient-Specific Goal (Individualization)  Description: You can add care plan individualizations to a care plan. Examples of Individualization might be:  \"Parent requests to be called daily at 9am for status\", \"I have a hard time hearing out of my right ear\", or \"Do not touch me to wake me up as it startles  me\".  11/6/2024 1324 by Catherine Szymanski RN  Outcome: Not Progressing  11/6/2024 1324 by Catherine Szymanski RN  Outcome: Not Progressing  Goal: Adheres to Safety Considerations for Self and Others  11/6/2024 1324 by Catherine Szymanski RN  Outcome: Not Progressing  11/6/2024 1324 by Catherine Szymanski RN  Outcome: Not Progressing  Intervention: Develop and Maintain Individualized Safety Plan  Recent Flowsheet Documentation  Taken 11/6/2024 1000 by Catherine Szymanski RN  Safety Measures: environmental rounds completed  Goal: Absence of New-Onset Illness or Injury  11/6/2024 1324 by Catherine Szymanski, RN  Outcome: Not Progressing  11/6/2024 1324 by Catherine Szymanski, RN  Outcome: Not Progressing  Intervention: Identify and Manage Fall Risk  Recent Flowsheet " Documentation  Taken 11/6/2024 1000 by Catherine Szymanski RN  Safety Measures: environmental rounds completed  Goal: Optimized Coping Skills in Response to Life Stressors  11/6/2024 1324 by Catherine Szymanski RN  Outcome: Not Progressing  11/6/2024 1324 by Catherine Szymanski RN  Outcome: Not Progressing  Goal: Develops/Participates in Therapeutic [place] to Support Successful Transition  11/6/2024 1324 by Catherine Szymanski RN  Outcome: Not Progressing  11/6/2024 1324 by Catherine Szymanski RN  Outcome: Not Progressing     Problem: Psychotic Signs/Symptoms  Goal: Improved Behavioral Control (Psychotic Signs/Symptoms)  11/6/2024 1324 by Catherine Szymanski RN  Outcome: Not Progressing  11/6/2024 1324 by Catherine Szymanski RN  Outcome: Not Progressing  Goal: Optimal Cognitive Function (Psychotic Signs/Symptoms)  11/6/2024 1324 by Catherine Szymanski RN  Outcome: Not Progressing  11/6/2024 1324 by Catherine Szymanski RN  Outcome: Not Progressing  Goal: Increased Participation and Engagement (Psychotic Signs/Symptoms)  11/6/2024 1324 by Catherine Szymanski RN  Outcome: Not Progressing  11/6/2024 1324 by Catherine Szymanski RN  Outcome: Not Progressing  Goal: Improved Mood Symptoms (Psychotic Signs/Symptoms)  11/6/2024 1324 by Catherine Szymanski RN  Outcome: Not Progressing  11/6/2024 1324 by Catherine Szymanski RN  Outcome: Not Progressing  Goal: Improved Psychomotor Symptoms (Psychotic Signs/Symptoms)  11/6/2024 1324 by Catherine Szymanski RN  Outcome: Not Progressing  11/6/2024 1324 by Catherine Szymanski RN  Outcome: Not Progressing  Intervention: Manage Psychomotor Movement  Recent Flowsheet Documentation  Taken 11/6/2024 1000 by Catherine Szymanski RN  Activity (Behavioral Health): up ad diya  Goal: Decreased Sensory Symptoms (Psychotic Signs/Symptoms)  11/6/2024 1324 by Catherine Szymanski RN  Outcome: Not Progressing  11/6/2024 1324 by Catherine Szymanski RN  Outcome: Not Progressing  Goal: Improved Sleep (Psychotic Signs/Symptoms)  11/6/2024 1324 by Catherine Szymanski RN  Outcome: Not  Progressing  11/6/2024 1324 by Catherine Szymanski RN  Outcome: Not Progressing  Goal: Enhanced Social, Occupational or Functional Skills (Psychotic Signs/Symptoms)  11/6/2024 1324 by Catherine Szymanski RN  Outcome: Not Progressing  11/6/2024 1324 by Catherine Szymanski RN  Outcome: Not Progressing     Problem: Depression  Goal: Improved Mood  11/6/2024 1324 by Catherine Szymanski RN  Outcome: Not Progressing  11/6/2024 1324 by Catherine Szymanski RN  Outcome: Not Progressing     Problem: Suicidal Behavior  Goal: Suicidal Behavior is Absent or Managed  11/6/2024 1324 by Catherine Szymanski RN  Outcome: Not Progressing  11/6/2024 1324 by Catherine Szymanski RN  Outcome: Not Progressing     Problem: Anxiety  Goal: Anxiety Reduction or Resolution  11/6/2024 1324 by Catherine Szymanski RN  Outcome: Not Progressing  11/6/2024 1324 by Catherine Szymanski RN  Outcome: Not Progressing     Problem: Sleep Disturbance  Goal: Adequate Sleep/Rest  11/6/2024 1324 by Catherine Szymanski RN  Outcome: Not Progressing  11/6/2024 1324 by Catherine Szymanski RN  Outcome: Not Progressing     Problem: Seclusion/Restraint, Behavioral  Goal: Seclusion/Behavioral Restraint Goal: Absence of Harm or Injury  11/6/2024 1324 by Catherine Szymanski RN  Outcome: Not Progressing  11/6/2024 1324 by Catherine Szymanski RN  Outcome: Not Progressing  Intervention: Implement Least Restrictive Safety Strategies  Recent Flowsheet Documentation  Taken 11/6/2024 1000 by Catherine Szymanski RN  Safety Measures: environmental rounds completed     Problem: Pain Acute  Goal: Optimal Pain Control and Function  11/6/2024 1324 by Catherine Szymanski RN  Outcome: Not Progressing  11/6/2024 1324 by Catherine Szymanski RN  Outcome: Not Progressing   Goal Outcome Evaluation:

## 2024-11-06 NOTE — TELEPHONE ENCOUNTER
R:    12:19 PM Received a call from [facility] resident, Dr Barrera, who is requesting pt be placed on the work list for transfer to . Per Bruce, pt's working dx is dementia and they are waiting for memory care placement. In the mean time, she does not feel [facility] is therapeutic and would like pt placed on the work list for transfer to . She states pt is only on an SIO on the night shift.    12:26 PM Provided  LENNIE Mccracken with [facility] for review.   1:06 PM Per LENNIE Mccracken, pt's SIO orders are for evenings and nights. They cannot take anymore SIOs per SEVERIANO Cardona.     Pt remains on the work list pending appropriate bed availability.

## 2024-11-06 NOTE — CARE PLAN
"  Rehab Group    Start time: 1325  End time: 1415  Patient time total: 45 minutes    attended full group    #1 attended   Group Type: OT Clinic   Group Topic Covered: activity therapy and coping skills     Group Session Detail:  Coping skill exploration, creative expression within personally meaningful activities, and observation of social, cognitive, and kinesthetic performance skills     Patient Response/Contribution:  positive affect and actively engaged     Patient Detail:  IND to select and initiate a detailed paint-by-sticker project. Required min assist from writer for fine motor component as Pt became \"frustrated\" by such tedious step. However,demonstrated independence with sequencing all other steps. Remained detail oriented and organized. Engaged in appropriate conversation with writer upon approach.         No Charge      Patient Active Problem List   Diagnosis    Hyperlipidemia LDL goal <130    Hypertension goal BP (blood pressure) < 130/80    Hypercalcemia    Hyperparathyroidism (H)    Thalassemia, unspecified type    Psychosis, unspecified psychosis type (H)    Acute psychosis (H)       "

## 2024-11-06 NOTE — PLAN OF CARE
Team Note Due:  Monday    Assessment/Intervention/Current Symtoms and Care Coordination:  Chart review and met with team, discussed pt progress, symptomology, and response to treatment.  Discussed the discharge plan and any potential impediments to discharge.    Received update from pt's daughter Maria C that they are working with an  now to file a petition for guardianship/conservatorship.    Discharge Plan or Goal:  Memory care facility     Barriers to Discharge:  Patient requires further psychiatric stabilization due to current symptomology, medication management with changes subject to provider, coordination with outside supports, and aftercare planning. Pt is under civil commitment.     Referral Status:  None at this time    Family would like to consider the following Memory Care Facilities:  [facility] ([place])  [facility] ([place])  [facility] ([place])     Legal Status:  [place] Commitment with [facility]  File Number: [id number]  Start and expiration date of commitment: 10/24/24 - 04/24/25    [facility] meds: Haldol, Clozaril, Risperdal, Invega, Zyprexa, Seroquel, Abilify    PPS:  Shelby Chowdary: [phone]  [email or web address].    Contacts:  Maria C Aaron (Daughter): [phone]   Clifford Aaron (ex-wife): [phone]     No Del Angel (guardianship/conservatorship ): [phone]  [email or web address]     Upcoming Meetings and Dates/Important Information and next steps:  Update UR on when pt's BX GA plan will term  Send updated DA with new diagnosis to [place]  Follow up with family regarding list of Memory Care facilities  Discharge planning when appropriate  Pt will need to apply for MA with financial counselors before a [employer] Assessment/SMRT can be requested for waivers.    Provisional Discharge and Change of Status needed at discharge

## 2024-11-06 NOTE — PLAN OF CARE
BEH IP Unit Acuity Rating Score (UARS)  Patient is given one point for every criteria they meet.    CRITERIA SCORING   On a 72 hour hold, court hold, committed, stay of commitment, or revocation. 1    Patient LOS on BEH unit exceeds 20 days. 1  LOS: 25   Patient under guardianship, 55+, otherwise medically complex, or under age 11. 1   Suicide ideation without relief of precipitating factors. 0   Current plan for suicide. 0   Current plan for homicide. 0   Imminent risk or actual attempt to seriously harm another without relief of factors precipitating the attempt. 1   Severe dysfunction in daily living (ex: complete neglect for self care, extreme disruption in vegetative function, extreme deterioration in social interactions). 1   Recent (last 7 days) or current physical aggression in the ED or on unit. 0   Restraints or seclusion episode in past 72 hours. 0   Recent (last 7 days) or current verbal aggression, agitation, yelling, etc., while in the ED or unit. 0   Active psychosis. 1   Need for constant or near constant redirection (from leaving, from others, etc).  0   Intrusive or disruptive behaviors. 0   Patient requires 3 or more hours of individualized nursing care per 8-hour shift (i.e. for ADLs, meds, therapeutic interventions). 1   TOTAL 7

## 2024-11-07 ENCOUNTER — TELEPHONE (OUTPATIENT)
Dept: BEHAVIORAL HEALTH | Facility: CLINIC | Age: 64
End: 2024-11-07
Payer: COMMERCIAL

## 2024-11-07 LAB

## 2024-11-07 PROCEDURE — 250N000013 HC RX MED GY IP 250 OP 250 PS 637

## 2024-11-07 PROCEDURE — 87635 SARS-COV-2 COVID-19 AMP PRB: CPT

## 2024-11-07 PROCEDURE — 124N000002 HC R&B MH UMMC

## 2024-11-07 PROCEDURE — 87633 RESP VIRUS 12-25 TARGETS: CPT

## 2024-11-07 PROCEDURE — 97150 GROUP THERAPEUTIC PROCEDURES: CPT | Mod: GO

## 2024-11-07 PROCEDURE — 99232 SBSQ HOSP IP/OBS MODERATE 35: CPT | Mod: GC | Performed by: STUDENT IN AN ORGANIZED HEALTH CARE EDUCATION/TRAINING PROGRAM

## 2024-11-07 RX ADMIN — CLONIDINE HYDROCHLORIDE 0.1 MG: 0.1 TABLET ORAL at 08:33

## 2024-11-07 RX ADMIN — LISINOPRIL 40 MG: 10 TABLET ORAL at 08:33

## 2024-11-07 RX ADMIN — OLANZAPINE 2.5 MG: 5 TABLET, ORALLY DISINTEGRATING ORAL at 08:33

## 2024-11-07 RX ADMIN — AMLODIPINE BESYLATE 10 MG: 5 TABLET ORAL at 08:33

## 2024-11-07 RX ADMIN — OLANZAPINE 5 MG: 5 TABLET, ORALLY DISINTEGRATING ORAL at 20:31

## 2024-11-07 RX ADMIN — ATORVASTATIN CALCIUM 40 MG: 20 TABLET, FILM COATED ORAL at 08:33

## 2024-11-07 RX ADMIN — FUROSEMIDE 40 MG: 40 TABLET ORAL at 08:33

## 2024-11-07 RX ADMIN — CINACALCET 30 MG: 30 TABLET ORAL at 08:33

## 2024-11-07 RX ADMIN — CLONIDINE HYDROCHLORIDE 0.1 MG: 0.1 TABLET ORAL at 20:32

## 2024-11-07 RX ADMIN — Medication 1 PATCH: at 08:33

## 2024-11-07 RX ADMIN — Medication 3 MG: at 20:32

## 2024-11-07 RX ADMIN — METOPROLOL SUCCINATE 50 MG: 50 TABLET, EXTENDED RELEASE ORAL at 08:33

## 2024-11-07 ASSESSMENT — ACTIVITIES OF DAILY LIVING (ADL)
ADLS_ACUITY_SCORE: 0
ADLS_ACUITY_SCORE: 0
HYGIENE/GROOMING: INDEPENDENT;PROMPTS
ADLS_ACUITY_SCORE: 0
ADLS_ACUITY_SCORE: 0
ORAL_HYGIENE: PROMPTS;INDEPENDENT
ADLS_ACUITY_SCORE: 0
ORAL_HYGIENE: INDEPENDENT
ADLS_ACUITY_SCORE: 0
DRESS: PROMPTS;INDEPENDENT
ADLS_ACUITY_SCORE: 0
HYGIENE/GROOMING: INDEPENDENT;PROMPTS
ADLS_ACUITY_SCORE: 0
DRESS: INDEPENDENT;STREET CLOTHES
ADLS_ACUITY_SCORE: 0
ADLS_ACUITY_SCORE: 0

## 2024-11-07 NOTE — TELEPHONE ENCOUNTER
R: [place]  Access Inpatient Bed Call Log 11/7/24 @ 7:04 am:    Intake has called facilities that have not updated the bed status within the last 12 hours.  -TRANSFER TO [address] [place] [address] is at capacity.                Pt remains on the work list pending appropriate bed availability.

## 2024-11-07 NOTE — TELEPHONE ENCOUNTER
R: [place]  Access Inpatient Bed Call Log  11/7/24 @ 1:00am   Intake has called facilities that have not updated their bed status within the last 12 hours.     *[facility] Transfer:  [place] [facility]: @ [facility].     Pt remains on waitlist pending appropriate placement availability.

## 2024-11-07 NOTE — CARE PLAN
Rehab Group    Start time: 1015  End time: 1055  Patient time total: 35 minutes    attended partial group    #5 attended   Group Type: occupational therapy   Group Topic Covered: coping skills and healthy leisure time     Group Session Detail:  Chair Yoga for occupation-based coping skill exploration group through mindful movement to facilitate stress management, awakening and/or relaxation. Education was provided on the use of stretching, exercise, and meditation practice as a coping tool within daily/weekly routine.      Patient Response/Contribution:  positive affect, cooperative with task, and socially appropriate     Patient Detail:  Independently followed and engaged in ~15 minutes of a guided yoga practice. Required min VC to refocus d/t intermittent distractibility. Expressed feeling sore in shoulders; writer encouraged continued gentle movement within ROM.         [id number] OT Group (2 or more in attendance)      Patient Active Problem List   Diagnosis    Hyperlipidemia LDL goal <130    Hypertension goal BP (blood pressure) < 130/80    Hypercalcemia    Hyperparathyroidism (H)    Thalassemia, unspecified type    Psychosis, unspecified psychosis type (H)    Acute psychosis (H)

## 2024-11-07 NOTE — PLAN OF CARE
Problem: Psychotic Signs/Symptoms  Goal: Improved Behavioral Control (Psychotic Signs/Symptoms)  Outcome: Progressing  Intervention: Manage Behavior  Recent Flowsheet Documentation  Taken 11/7/2024 1700 by Pool Urbina RN  De-Escalation Techniques: verbally redirected     Problem: Anxiety  Goal: Anxiety Reduction or Resolution  Outcome: Progressing  Intervention: Promote Anxiety Reduction  Recent Flowsheet Documentation  Taken 11/7/2024 1700 by Pool Urbina RN  Family/Support System Care:   involvement promoted   self-care encouraged   support provided   Goal Outcome Evaluation:    Plan of Care Reviewed With: patient      Patient is pleasant and cooperative with staff; he is present in the milieu intermittently; he is compliant with medications; he is calm, but suspicious of staff;he still exhibits flight of ideas when communicating and still makes illogical statements; his judgement and insight to his situation is still somewhat poor, but better than before; he still appears confused and disoriented to situations; this evening, he thought his 1:1 staff was his niece and refused vital sign checks. He  denied mental health symptoms and was difficult to redirect. Will continue to monitor

## 2024-11-07 NOTE — PROGRESS NOTES
"  ----------------------------------------------------------------------------------------------------------  [facility]  Psychiatry Progress Note  Hospital Day #26     Interim History:     The patient's care was discussed with the treatment team and chart notes were reviewed.    Vitals: VSS  Sleep: 6 h  Scheduled medications: Took all scheduled medications as prescribed  Psychiatric PRN medications: None     Staff Report:   -Trying to get into the exam room   -Attended group  -During evening, did not try to elope     Please see staff notes for details.      Subjective:     Patient Interview:  Estevan \"Santos\" Agnieszka was interviewed in his room. He was lying on his bed when approached by the team. Reports that he has been coughing white phlegm that has been occurring for the past couple weeks. Reports feeling achy. The team informed him that we will order a respiratory panel and test for COVID, patient was agreeable.     Reports his mood is good. Not oriented to the building. Oriented to the date and month (November 7th). Shares that he has not gotten a chance to talk to his daughter Maria C. Tells the team that there was a party and \"I don't know where I was. I was wondering around at the [facility] building.\"     ROS:  Negative except for above.      Objective:     Vitals:  /68   Pulse 60   Temp 97.6  F (36.4  C) (Oral)   Resp 16   Wt 105.4 kg (232 lb 4.8 oz)   SpO2 96%   BMI 37.49 kg/m      Allergies:  No Known Allergies    Current Medications:  Scheduled:  Current Facility-Administered Medications   Medication Dose Route Frequency Provider Last Rate Last Admin    acetaminophen (TYLENOL) tablet 650 mg  650 mg Oral Q4H PRN Nikki Caceres MD   650 mg at 11/05/24 0905    alum & mag hydroxide-simethicone (MAALOX) suspension 30 mL  30 mL Oral Q4H PRN Nikki Caceres MD   30 mL at 10/17/24 0837    amLODIPine (NORVASC) tablet 10 mg  10 mg Oral Daily Bruce, " MD Presley   10 mg at 11/06/24 0803    atorvastatin (LIPITOR) tablet 40 mg  40 mg Oral Daily Nikki Caceres MD   40 mg at 11/06/24 0803    cholecalciferol (VITAMIN D3) capsule 1,250 mcg  1,250 mcg Oral Q7 Days Evelia Grimm DO   1,250 mcg at 11/05/24 1234    cinacalcet (SENSIPAR) tablet 30 mg  30 mg Oral Daily Presley Barrera MD   30 mg at 11/06/24 0803    cloNIDine (CATAPRES) tablet 0.1 mg  0.1 mg Oral BID Presley Barrera MD   0.1 mg at 11/06/24 1904    furosemide (LASIX) tablet 40 mg  40 mg Oral Daily Presley Barrera MD   40 mg at 11/06/24 0804    gabapentin (NEURONTIN) capsule 100 mg  100 mg Oral Q6H PRN Nikki Caceres MD        lisinopril (ZESTRIL) tablet 40 mg  40 mg Oral Daily Presley Barrera MD   40 mg at 11/06/24 0804    melatonin tablet 3 mg  3 mg Oral At Bedtime Presley Barrera MD   3 mg at 11/06/24 1904    metoprolol succinate ER (TOPROL XL) 24 hr tablet 50 mg  50 mg Oral Daily Presley Barrera MD   50 mg at 11/06/24 0804    nicotine (NICODERM CQ) 14 MG/24HR 24 hr patch 1 patch  1 patch Transdermal Daily Evelia Grimm DO   1 patch at 11/06/24 0809    nicotine (NICODERM CQ) 21 MG/24HR 24 hr patch 1 patch  1 patch Transdermal Daily PRN Britt Kang MD   1 patch at 10/13/24 1611    nicotine (NICORETTE) gum 2 mg  2 mg Buccal Q1H PRN González Garcia MD   2 mg at 10/24/24 1254    OLANZapine (zyPREXA) tablet 5 mg  5 mg Oral TID PRN Evelia Grimm DO   5 mg at 10/24/24 1101    Or    OLANZapine (zyPREXA) injection 5 mg  5 mg Intramuscular TID PRN Evelia Grimm DO        OLANZapine zydis (zyPREXA) ODT half-tab 2.5 mg  2.5 mg Oral QAM Presley Barrera MD   2.5 mg at 11/06/24 0803    OLANZapine zydis (zyPREXA) ODT tab 5 mg  5 mg Oral At Bedtime González Garcia MD   5 mg at 11/06/24 1905    polyethylene glycol (MIRALAX) Packet 17 g  17 g Oral Daily PRN Nikki Caceres MD        traZODone (DESYREL) half-tab 25 mg  25 mg Oral At Bedtime PRN Evelia Grimm DO   25 mg at 11/04/24 0141    Or    traZODone  (DESYREL) tablet 50 mg  50 mg Oral At Bedtime PRN Evelia Grimm DO         PRN:  Current Facility-Administered Medications   Medication Dose Route Frequency Provider Last Rate Last Admin    acetaminophen (TYLENOL) tablet 650 mg  650 mg Oral Q4H PRN Nikki Caceres MD   650 mg at 11/05/24 0905    alum & mag hydroxide-simethicone (MAALOX) suspension 30 mL  30 mL Oral Q4H PRN Nikki Caceres MD   30 mL at 10/17/24 0837    amLODIPine (NORVASC) tablet 10 mg  10 mg Oral Daily Presley Barrera MD   10 mg at 11/06/24 0803    atorvastatin (LIPITOR) tablet 40 mg  40 mg Oral Daily Nikki Caceres MD   40 mg at 11/06/24 0803    cholecalciferol (VITAMIN D3) capsule 1,250 mcg  1,250 mcg Oral Q7 Days Evelia Grimm DO   1,250 mcg at 11/05/24 1234    cinacalcet (SENSIPAR) tablet 30 mg  30 mg Oral Daily Presley Barrera MD   30 mg at 11/06/24 0803    cloNIDine (CATAPRES) tablet 0.1 mg  0.1 mg Oral BID Presley Barrera MD   0.1 mg at 11/06/24 1904    furosemide (LASIX) tablet 40 mg  40 mg Oral Daily Presley Barrera MD   40 mg at 11/06/24 0804    gabapentin (NEURONTIN) capsule 100 mg  100 mg Oral Q6H PRN Nikki Caceres MD        lisinopril (ZESTRIL) tablet 40 mg  40 mg Oral Daily Presley Barrera MD   40 mg at 11/06/24 0804    melatonin tablet 3 mg  3 mg Oral At Bedtime Presley Barrera MD   3 mg at 11/06/24 1904    metoprolol succinate ER (TOPROL XL) 24 hr tablet 50 mg  50 mg Oral Daily Presley Barrera MD   50 mg at 11/06/24 0804    nicotine (NICODERM CQ) 14 MG/24HR 24 hr patch 1 patch  1 patch Transdermal Daily Evelia Grimm DO   1 patch at 11/06/24 0809    nicotine (NICODERM CQ) 21 MG/24HR 24 hr patch 1 patch  1 patch Transdermal Daily PRN Britt Kang MD   1 patch at 10/13/24 1611    nicotine (NICORETTE) gum 2 mg  2 mg Buccal Q1H PRN González Garcia MD   2 mg at 10/24/24 1254    OLANZapine (zyPREXA) tablet 5 mg  5 mg Oral TID PRN Evelia Grimm DO   5 mg at 10/24/24 1101    Or    OLANZapine (zyPREXA) injection 5 mg  5 mg  "Intramuscular TID PRN Evelia Grimm DO        OLANZapine zydis (zyPREXA) ODT half-tab 2.5 mg  2.5 mg Oral QAM Presley Barrera MD   2.5 mg at 11/06/24 0803    OLANZapine zydis (zyPREXA) ODT tab 5 mg  5 mg Oral At Bedtime González Garcia MD   5 mg at 11/06/24 1905    polyethylene glycol (MIRALAX) Packet 17 g  17 g Oral Daily PRN Nikki Caceres MD        traZODone (DESYREL) half-tab 25 mg  25 mg Oral At Bedtime PRN Evelia Grimm DO   25 mg at 11/04/24 0141    Or    traZODone (DESYREL) tablet 50 mg  50 mg Oral At Bedtime PRN Evelia Grimm DO         Labs and Imaging:    Data this admission:  - CBC unremarkable  - CMP unremarkable  - TSH normal  - UDS negative  - Vit D low  - Hgb A1c 5.9 (10/13/24)  - Lipids unremarkable  - Vit B12 normal  - Folate normal  - Urinalysis unremarkable  - EKG normal sinus rhythm, QTc 390  - Head CT showed no acute changes  - HIV non reactive  - Treponema antibody non reactive  - ESR wnl   - Ceruloplasmin wnl   - BOOGIE negative  - Lyme negative      Parathyroid hormone:   10/13: 85     Calcium:  10/14: 11.4  10/16: 10.3  10/17: 10.3  10/19: 9.8  10/21: 10.6  10/23: 10.3     Albumin:  10/14: 4.3  10/16: 4.3  10/17: 4.1  10/19: 4.2  10/21: 4.5  10/23: 4.3      MRI:   IMPRESSION:  1. No acute intracranial pathology.  2. No focal lesion or structural abnormality.   3. Symmetric frontoparietal cortical volume loss slightly more than  expected for age.     Mental Status Exam:     Oriented to:  Oriented to self and day, not oriented to location or situation  General:  Awake and Confused  Appearance:  appears stated age and Dressed in his own clothes   Behavior/Attitude:  Calm, Cooperative, and Indifferent  Eye Contact: Appropriate  Psychomotor: No evidence of tics, dystonia, or tardive dyskinesia  no catatonia present  Speech:  appropriate volume/tone  Language: Fluent in English with appropriate syntax and vocabulary.  Mood:  \"good\"  Affect:  congruent with mood  Thought Process:  looseness of " association, disorganized, and confused  Thought Content:   Did not assess SI/HI/AH/VH today; no apparent delusions  Associations:  loose  Insight:  impaired   Judgment:  partial   Impulse control: limited  Attention Span:  decreased  Concentration:  distractible  Recent and Remote Memory:  recent memory impaired, remote memory intact  Fund of Knowledge: average  Muscle Strength and Tone: normal  Gait and Station: Normal     Psychiatric Assessment     Estevan Aaron is a 64 year old male with previous psychiatric diagnoses of GEORGINA admitted from the ER on 10/12/2024 due to concern for HI and psychosis in the context of medical issues (hyperthyroidism, hypercalcemia), psychosocial stressors including with recent divorce and selling his home. This is the patient's first psychiatric hospitalization. Significant symptoms on admission included delusions of persecution with grandiose beliefs, homicidal ideations, as well as disorganized thinking and behavior. The MSE on admission was pertinent for a thought process which was perseverative, circumstantial, tangential, disorganized and tangential; with rambling and looseness of associations. Psychological contributions to presentation included lack of insight. Social factors contributing to presentation included isolation, recent divorce, selling his house, and moving from hotel to hotel. Biological contributions to presentation included a history of hyperparathyroidism with chronic hypercalcemia per charts as well as a history of methamphetamine use per collateral from ex-wife.     History was difficult to obtain due to the patient's severe disorganized thinking on interview; he was observed with persecutory delusions pertaining to the realtor and gang members, disorganized behavior as these delusions have led him to flee to different hotels to stay safe/ has called  complaining of being targeted and auditory hallucinations of voices for the past 12 months. Per collateral  from ex-wife, Santos has had paranoid ideations since they first met. He has always felt like people are out to get him or trying to rip him off. She says that he has had visual hallucinations (he will point things out that the rest of the family can't see) for a long time, but the family would just go along with him to avoid making him angry. This timeline and presentation could be consistent with diagnosis of paranoid personality disorder. Things began to worsen around the beginning of the COVID pandemic, when Santos became more isolated and the family started to notice cognitive issues such as impaired memory. Immediately prior to this hospitalization, the ex-wife found Santos in a hotel room with a generator full of gasoline, binoculars, and hunting equipment. This time course does not suggest acute psychosis, however given the patient has never had a full psychiatric work-up, we completed a first-episode psychosis work-up.      Other things that could be contributing to his presentation is hyperparathyroidism with hypercalcemia. However, patient's calcium returned to normal limits on 10/16, and patient continues to have psychosis so likely not a significant contributing factor to patient's presentation.      Patient also continues to be disoriented and his behaviors appear to worsen in the evenings. This raised concern for underlying neurocognitive disorder with delirium. Patient received MRI brain with and without contrast which showed frontal and parietal atrophy greater than would be expected for patient's age. This is consistent with neurocognitive disorder. Patient has continued to improve with decreasing his antipsychotic. Working to get patient transferred to geriatric psychiatry in the short term and to memory care long term.      Given that he currently has psychosis, patient warrants inpatient psychiatric hospitalization to maintain his safety.     Psychiatric Plan by Diagnosis      Today's changes:  - None      # Major Neurocognitive Disorder  # Rule out paranoid personality disorder  1. Medications:  - Discontinued PTA fluoxetine 40 mg, consider restarting at a later time   - Olanzapine 2.5 mg during day and 5 mg at bedtime     2. Pertinent Labs/Monitoring:   - See above     3. Additional Plans:  - Patient will be treated in therapeutic milieu with appropriate individual and group therapies as described     # Unspecified anxiety vs Generalized Anxiety Disorder  - Monitor for symptoms.  - Fluoxetine held due to suspicion of ongoing manic symptoms     Psychiatric Hospital Course:      Estevan Aaron was admitted to [facility] on a 72 hour hold.   Medications:  PTA fluoxetine was held due to concern for worsening of zelalem   New medications started at the time of admission include Zyprexa.   Increased olanzapine 10 mg at bedtime was to 10 mg BID (10/14)   Increased olanzapine 10 mg BID to 10 mg during day and 15 mg at bedtime (10/15)  10/21: increased olanzapine pm dose from 15 to 20 mg  10/23: started melatonin 3 mg at 7 pm to help patient with circadian rhythm as he has been staying up throughout the night and sleeping a lot during the day and there is some concern for delirium   10/24: consulted anesthesia for MRI brain   10/28: MRI brain complete, decrease AM olanzapine from 10 to 5 mg  10/29: Morning olanzapine decreased to 2.5 mg, evening olanzapine decreased to 15 mg   11/1: Decreased evening olanzapine to 10 mg   11/4: Decreased evening olanzapine to 7.5 mg   11/5: Decreased evening olanzapine to 5 mg      Care conference 10/18/24 with daughter Maria C and ex-wife Clifford  - Report that patient has always been paranoid since ex-wife first met him. She describes him always feeling like he is getting ripped off.   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 They reported once incidence in which he made a bid on a job and the client paid him, but he never ended up finishing the job  - Wife and him  officially this year, and since selling the house several months ago, patient has been moving from hotel to hotel due to thinking people are out to get him   - When they were selling their house, patient threatened realtors and felt he was being ripped off   - Clifford reports that she started to be afraid to be around him alone  - When he was found in the hotel, he had a generator full of gasoline, binoculars, bullets, and hunting equipment.     10/21/24: updated daughter on Santos's treatment plan      10/23/24: updated daughter on Santos's treatment plan      10/25/24: updated daughter on Santos's treatment plan, including plan to try and get MRI brain done under sedation. She said that Santos's grandfather had dementia and his sister has a brain tumor.      10/28/24: updated daughter on Santos's MRI results. She is planning on coming into town soon.      Care conference 11/1/24 with daughters Maria C and Estefani and ex-wife Clifford  - Discussed dementia diagnosis   - Clifford asked about plans moving forward. Explained that applying for medical assistance is the first step. Explained that application processing time can be very variable. Informed family that Santos will most likely be staying on this inpatient unit during this time.   - Clifford expressed that their family would like to be the power of /have conservatorship for Santos.   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 Reports that he has bills that he has not paid.   - Maria C is planning to move to  [place], and Clifford currently lives in [place]. Family prefer that Santos would in a facility that are near these locations. Discussed with family that they can also try to request a specific location (memory care).   - Clifford reports that he had previously gotten help applying for his social security and medicare, but unsure if it had been approved.   - Informed family that there is a geriatric unit and there might be a transfer if there becomes availability on this unit.   - Family shared that he was completely different just two months ago.   - Estefani shared that his family found his medications in his old truck recently, expressed that it was likely that he was not taking his medications prior to his hospitalization.     11/6/24: updated Maria C on plan to try and transfer Santos to Geriatric unit.      The risks, benefits, alternatives, and side effects were discussed and understood by the patient and other caregivers.     Medical Assessment and Plan     Medical diagnoses to be addressed this admission:    # Hypertension  - Continue PTA medications  Furosemide 40 mg daily  Lisinopril 40 mg daily  Metoprolol 50 mg daily  Amlodipine 10 mg daily  Clonidine 0.1 mg BID     # Hyperlipidemia  - Continue PTA Atorvastatin 40 mg     # Hyperparathyroidism  # Hypercalcemia, hypophosphoremia   Increased Ca level to 10.9 and decreased Ph level to 2.3 in the ED. Suspicion patient's hypercalcemia could be contributing to symptoms of psychosis.  - Consulted endocrinology, who started cinacalcet 30 mg BID on 10/13  - 10/16 endocrinology recommended continuing to trend calcium and albumin to make sure patient does not become hypocalcemic and recommended holding cinacalcet   - 10/17, calcium and albumin wnl, endo recommended cinacalcet 30 mg once daily   - 10/21, per endo continue cincaclcet and recheck calcium and albumin on October 24  - 10/23: per endo, patient needs to follow up outpatient for further management of  hyperparathyroidism      Medical course: Patient was physically examined by the ED prior to being transferred to the unit and was found to be medically stable and appropriate for admission.      Consults: Psychiatry, Endocrinology (follow-up of hyperthyroidism / hypercalcemia and hypertension)     Checklist     Legal Status: Committed     Safety Assessment:   Behavioral Orders   Procedures    Assault precautions    Code 1 - Restrict to Unit    Routine Programming     As clinically indicated    Status 15     Every 15 minutes.    Status Individual Observation     1:1 during evening shift, & night shift.    If patient refuses overnight presence of staff in his room, it's ok to do 15 min checks through the window blinds.    Patient SIO status reviewed with team/RN.  Please also refer to RN/team documentation for add'l detail.    -SIO staff to monitor following which have contributed to patient being on SIO:  Pt has psychosis regarding being followed and attacked, very paranoid, HI    -Possible interventions SIO staff could use to support patient's treatment progress:  Redirect and reassure  -When following observed, team will review discontinuation of SIO:  Psychosis improves     Order Specific Question:   CONTINUOUS 24 hours / day     Answer:   Other     Order Specific Question:   Specify distance     Answer:   10 feet, 5 feet when close to the doors     Order Specific Question:   Indications for SIO     Answer:   Assault risk     Order Specific Question:   Indications for SIO     Answer:   Severe intrusiveness     Risk Assessment:  Risk for harm is elevated.  Risk factors: impulsive and past behaviors  Protective factors: family     SIO: 1:1 during evening and night shift     Disposition: Pending stabilization, medication optimization, & development of a safe discharge plan.      Attestations     This patient was seen and discussed with my attending physician.  Presley Barrera MD  PGY-1 Psychiatry Resident

## 2024-11-07 NOTE — PLAN OF CARE
Problem: Sleep Disturbance  Goal: Adequate Sleep/Rest  Outcome: Progressing    Pt appears to have slept for 6 hours. Pt denies pain and no PRN medications were given. Pt denies anxiety, depression, and SI/SIB. Pt is on 1-1 SIO for assault risk and severe intrusiveness . 15 minutes safety checks were in place. Staff will continue to offer support to pt.

## 2024-11-07 NOTE — PLAN OF CARE
BEH IP Unit Acuity Rating Score (UARS)  Patient is given one point for every criteria they meet.    CRITERIA SCORING   On a 72 hour hold, court hold, committed, stay of commitment, or revocation. 1    Patient LOS on BEH unit exceeds 20 days. 1  LOS: 26   Patient under guardianship, 55+, otherwise medically complex, or under age 11. 1   Suicide ideation without relief of precipitating factors. 0   Current plan for suicide. 0   Current plan for homicide. 0   Imminent risk or actual attempt to seriously harm another without relief of factors precipitating the attempt. 1   Severe dysfunction in daily living (ex: complete neglect for self care, extreme disruption in vegetative function, extreme deterioration in social interactions). 1   Recent (last 7 days) or current physical aggression in the ED or on unit. 0   Restraints or seclusion episode in past 72 hours. 0   Recent (last 7 days) or current verbal aggression, agitation, yelling, etc., while in the ED or unit. 0   Active psychosis. 1   Need for constant or near constant redirection (from leaving, from others, etc).  0   Intrusive or disruptive behaviors. 0   Patient requires 3 or more hours of individualized nursing care per 8-hour shift (i.e. for ADLs, meds, therapeutic interventions). 1   TOTAL 7

## 2024-11-07 NOTE — PLAN OF CARE
Team Note Due:  Monday    Assessment/Intervention/Current Symtoms and Care Coordination:  Chart review and met with team, discussed pt progress, symptomology, and response to treatment.  Discussed the discharge plan and any potential impediments to discharge.    Updated DA completed to reflect Major Neurcog Disorder dx.    Received update from PPS/CM indicating she's been unable to make referral for MNChoice assessment but is continuing to reach out to [facility].    Discharge Plan or Goal:  Memory care facility     Barriers to Discharge:  Patient requires further psychiatric stabilization due to current symptomology, medication management with changes subject to provider, coordination with outside supports, and aftercare planning. Pt is under civil commitment.     Referral Status:  None at this time    Family would like to consider the following Memory Care Facilities:  [facility] ([facility] ([facility]     Legal Status:  MI Commitment with [facility]  File Number: [id number]  Start and expiration date of commitment: 10/24/24 - 04/24/25    [facility] meds: Haldol, Clozaril, Risperdal, Invega, Zyprexa, Seroquel, Abilify    PPS/CM:  Shelby Chowdary: [phone]  [email or web address].    Contacts:  Maria C Aaron (Daughter): [phone]   Clifford Agnieszka (ex-wife): [phone]     No Del Angel (guardianship/conservatorship ): [phone]  [email or web address]     Upcoming Meetings and Dates/Important Information and next steps:  Update UR on when pt's BX GA plan will term  Follow up with family regarding list of Memory Care facilities  Discharge planning when appropriate  Pt will need to apply for MA with financial counselors before a MNChoices Assessment/SMRT can be requested for waivers.    Provisional Discharge and Change of Status needed at discharge
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
## 2024-11-07 NOTE — TELEPHONE ENCOUNTER
R: [place]  Access Inpatient Bed Call Log 11/7/24 @ 7:04 am:    Intake has called facilities that have not updated the bed status within the last 12 hours.  -TRANSFER TO .                          [facility] is at capacity.              Pt remains on the work list pending appropriate bed availability.

## 2024-11-08 ENCOUNTER — TELEPHONE (OUTPATIENT)
Dept: BEHAVIORAL HEALTH | Facility: CLINIC | Age: 64
End: 2024-11-08
Payer: COMMERCIAL

## 2024-11-08 PROCEDURE — 124N000002 HC R&B MH UMMC

## 2024-11-08 PROCEDURE — 250N000013 HC RX MED GY IP 250 OP 250 PS 637

## 2024-11-08 PROCEDURE — 99232 SBSQ HOSP IP/OBS MODERATE 35: CPT | Mod: GC | Performed by: PSYCHIATRY & NEUROLOGY

## 2024-11-08 RX ADMIN — CLONIDINE HYDROCHLORIDE 0.1 MG: 0.1 TABLET ORAL at 08:07

## 2024-11-08 RX ADMIN — OLANZAPINE 2.5 MG: 5 TABLET, ORALLY DISINTEGRATING ORAL at 08:07

## 2024-11-08 RX ADMIN — Medication 3 MG: at 19:56

## 2024-11-08 RX ADMIN — LISINOPRIL 40 MG: 10 TABLET ORAL at 08:07

## 2024-11-08 RX ADMIN — FUROSEMIDE 40 MG: 40 TABLET ORAL at 08:07

## 2024-11-08 RX ADMIN — ATORVASTATIN CALCIUM 40 MG: 20 TABLET, FILM COATED ORAL at 08:07

## 2024-11-08 RX ADMIN — CLONIDINE HYDROCHLORIDE 0.1 MG: 0.1 TABLET ORAL at 19:56

## 2024-11-08 RX ADMIN — OLANZAPINE 5 MG: 5 TABLET, ORALLY DISINTEGRATING ORAL at 19:56

## 2024-11-08 RX ADMIN — METOPROLOL SUCCINATE 50 MG: 50 TABLET, EXTENDED RELEASE ORAL at 08:07

## 2024-11-08 RX ADMIN — AMLODIPINE BESYLATE 10 MG: 5 TABLET ORAL at 08:07

## 2024-11-08 RX ADMIN — Medication 1 PATCH: at 08:12

## 2024-11-08 RX ADMIN — CINACALCET 30 MG: 30 TABLET ORAL at 08:07

## 2024-11-08 ASSESSMENT — ACTIVITIES OF DAILY LIVING (ADL)
ADLS_ACUITY_SCORE: 0
LAUNDRY: UNABLE TO COMPLETE
ADLS_ACUITY_SCORE: 0
LAUNDRY: UNABLE TO COMPLETE
ADLS_ACUITY_SCORE: 0
ORAL_HYGIENE: INDEPENDENT;PROMPTS
ADLS_ACUITY_SCORE: 0
ORAL_HYGIENE: INDEPENDENT;PROMPTS
ADLS_ACUITY_SCORE: 0
ADLS_ACUITY_SCORE: 0
DRESS: INDEPENDENT;STREET CLOTHES
ADLS_ACUITY_SCORE: 0
HYGIENE/GROOMING: INDEPENDENT;PROMPTS
ADLS_ACUITY_SCORE: 0
HYGIENE/GROOMING: INDEPENDENT
ADLS_ACUITY_SCORE: 0
DRESS: INDEPENDENT;STREET CLOTHES
ADLS_ACUITY_SCORE: 0
ADLS_ACUITY_SCORE: 0

## 2024-11-08 NOTE — TELEPHONE ENCOUNTER
R: Per Zayda on 3b at 3:03 pm, pt is declined due to needing an SIO and unit is capped for SIO's. Author informed Yara in intake.

## 2024-11-08 NOTE — PLAN OF CARE
BEH IP Unit Acuity Rating Score (UARS)  Patient is given one point for every criteria they meet.    CRITERIA SCORING   On a 72 hour hold, court hold, committed, stay of commitment, or revocation. 1    Patient LOS on BEH unit exceeds 20 days. 1  LOS: 27   Patient under guardianship, 55+, otherwise medically complex, or under age 11. 1   Suicide ideation without relief of precipitating factors. 0   Current plan for suicide. 0   Current plan for homicide. 0   Imminent risk or actual attempt to seriously harm another without relief of factors precipitating the attempt. 1   Severe dysfunction in daily living (ex: complete neglect for self care, extreme disruption in vegetative function, extreme deterioration in social interactions). 1   Recent (last 7 days) or current physical aggression in the ED or on unit. 0   Restraints or seclusion episode in past 72 hours. 0   Recent (last 7 days) or current verbal aggression, agitation, yelling, etc., while in the ED or unit. 0   Active psychosis. 1   Need for constant or near constant redirection (from leaving, from others, etc).  0   Intrusive or disruptive behaviors. 0   Patient requires 3 or more hours of individualized nursing care per 8-hour shift (i.e. for ADLs, meds, therapeutic interventions). 1   TOTAL 7

## 2024-11-08 NOTE — PLAN OF CARE
Team Note Due:  Monday    Assessment/Intervention/Current Symtoms and Care Coordination:  Chart review and met with team, discussed pt progress, symptomology, and response to treatment.  Discussed the discharge plan and any potential impediments to discharge.    Shelby submitted referral for MNChoice assessment. Writer was listed as primary scheduling contact.    Discharge Plan or Goal:  Memory care facility     Barriers to Discharge:  Patient requires further psychiatric stabilization due to current symptomology, medication management with changes subject to provider, coordination with outside supports, and aftercare planning. Pt is under civil commitment.     Referral Status:  None at this time    Family would like to consider the following Memory Care Facilities:  [facility] ([place])  [facility] ([place])  [facility] ([place])     Legal Status:  MI Commitment with [facility]  File Number: [id number]  Start and expiration date of commitment: 10/24/24 - 04/24/25    [facility] meds: Haldol, Clozaril, Risperdal, Invega, Zyprexa, Seroquel, Abilify    PPS/CM:  Shelby Chowdary: [phone]  [email or web address]    Contacts:  Maria C Aaron (Daughter): [phone]   Clifford Agnieszka (ex-wife): [phone]     No Del Angel (guardianship/conservatorship ): [phone]  [email or web address]     Upcoming Meetings and Dates/Important Information and next steps:  Update UR on when pt's BX GA plan will term  Follow up with family regarding list of Memory Care facilities  Discharge planning when appropriate  Pt will need to apply for MA with financial counselors before a MNChoices Assessment/SMRT can be requested for waivers.    Provisional Discharge and Change of Status needed at discharge

## 2024-11-08 NOTE — PLAN OF CARE
"Pt took am meds as per order.  Pt eat meals and filled out his menu this am.  Pt did not attend any groups. Pt observed sitting on bed and looked out his window.  Pt continues to have periods of confusion.  Pt irritable in late afternoon.  Told staff to go away and did not want to talk to them,.     Problem: Adult Behavioral Health Plan of Care  Goal: Plan of Care Review  Outcome: Not Progressing  Goal: Patient-Specific Goal (Individualization)  Description: You can add care plan individualizations to a care plan. Examples of Individualization might be:  \"Parent requests to be called daily at 9am for status\", \"I have a hard time hearing out of my right ear\", or \"Do not touch me to wake me up as it startles  me\".  Outcome: Not Progressing  Goal: Adheres to Safety Considerations for Self and Others  Outcome: Not Progressing  Intervention: Develop and Maintain Individualized Safety Plan  Recent Flowsheet Documentation  Taken 11/8/2024 1000 by Catherine Szymanski RN  Safety Measures: environmental rounds completed  Goal: Absence of New-Onset Illness or Injury  Outcome: Not Progressing  Intervention: Identify and Manage Fall Risk  Recent Flowsheet Documentation  Taken 11/8/2024 1000 by Catherine Szymanski RN  Safety Measures: environmental rounds completed  Goal: Optimized Coping Skills in Response to Life Stressors  Outcome: Not Progressing  Goal: Develops/Participates in Therapeutic [place] to Support Successful Transition  Outcome: Not Progressing     Problem: Psychotic Signs/Symptoms  Goal: Improved Behavioral Control (Psychotic Signs/Symptoms)  Outcome: Not Progressing  Goal: Optimal Cognitive Function (Psychotic Signs/Symptoms)  Outcome: Not Progressing  Goal: Increased Participation and Engagement (Psychotic Signs/Symptoms)  Outcome: Not Progressing  Goal: Improved Mood Symptoms (Psychotic Signs/Symptoms)  Outcome: Not Progressing  Goal: Improved Psychomotor Symptoms (Psychotic Signs/Symptoms)  Outcome: Not " Progressing  Intervention: Manage Psychomotor Movement  Recent Flowsheet Documentation  Taken 11/8/2024 1000 by Catherine Szymanski RN  Activity (Behavioral Health): up ad diya  Goal: Decreased Sensory Symptoms (Psychotic Signs/Symptoms)  Outcome: Not Progressing  Goal: Improved Sleep (Psychotic Signs/Symptoms)  Outcome: Not Progressing  Goal: Enhanced Social, Occupational or Functional Skills (Psychotic Signs/Symptoms)  Outcome: Not Progressing     Problem: Depression  Goal: Improved Mood  Outcome: Not Progressing     Problem: Suicidal Behavior  Goal: Suicidal Behavior is Absent or Managed  Outcome: Not Progressing     Problem: Anxiety  Goal: Anxiety Reduction or Resolution  Outcome: Not Progressing     Problem: Sleep Disturbance  Goal: Adequate Sleep/Rest  Outcome: Not Progressing     Problem: Seclusion/Restraint, Behavioral  Goal: Seclusion/Behavioral Restraint Goal: Absence of Harm or Injury  Outcome: Not Progressing  Intervention: Implement Least Restrictive Safety Strategies  Recent Flowsheet Documentation  Taken 11/8/2024 1000 by Catherine Szymanski, RN  Safety Measures: environmental rounds completed     Problem: Pain Acute  Goal: Optimal Pain Control and Function  Outcome: Not Progressing   Goal Outcome Evaluation:

## 2024-11-08 NOTE — PLAN OF CARE
Problem: Sleep Disturbance  Goal: Adequate Sleep/Rest  Outcome: Progressing   Patient sleeps on and off this shift. No complaints of pain and discomfort. Patient continues on q15 minutes safety checks, no behavioral issues noted. Patient easy to redirect. Patient continuous on SIO (1:1) on evening and night shift only. Will continue to monitor the patient and provide therapeutic intervention as needed. Will continue with current plan of care. Notify MD with any concerns. The patient had 4.25 total hours of sleep this shift.

## 2024-11-08 NOTE — PLAN OF CARE
Problem: Psychotic Signs/Symptoms  Goal: Improved Behavioral Control (Psychotic Signs/Symptoms)  Outcome: Progressing  Intervention: Manage Behavior  Recent Flowsheet Documentation  Taken 11/8/2024 0383 by Tamie Fraser RN  De-Escalation Techniques: verbally redirected     Problem: Anxiety  Goal: Anxiety Reduction or Resolution  Outcome: Progressing   Goal Outcome Evaluation:    Pt was visible  in the lounge watching TV. Pt presented  with a flat  affect but friendly upon approach. Mood is calm and cooperative.  He was observed pacing the marmolejo. Pt denied pain or discomfort. Nutrition and hydration adequate. Pt denies all psych and mental health symptoms. Hygiene is appropriate. Pt was cooperative, compliant with medication and contracted for safety. No behavioral escalation. No PRN administered.

## 2024-11-08 NOTE — PROGRESS NOTES
"  ----------------------------------------------------------------------------------------------------------  [facility]  Psychiatry Progress Note  Hospital Day #27     Interim History:     The patient's care was discussed with the treatment team and chart notes were reviewed.    Vitals: VSS  Sleep: 4.25 h  Scheduled medications: Took all scheduled medications as prescribed  Psychiatric PRN medications: None     Staff Report:   -Trying to get into the exam room   -Attended group  -During evening, did not try to elope     Please see staff notes for details.      Subjective:     Patient Interview:  Estevan \"Santos\" Agnieszka was interviewed in his room. He was sitting on his bed looking out the window when approached by the team. Reports he slept good last night. Tells the team he thinks we will be getting some rain. Reports his mood is good today. Shares that he is making a Goose for his art project.     Not oriented to building (thought he was in a school). Oriented to day, month, and year (November 8, 2024). Patient pointed out the window and asked \"Are those docks?\"     ROS:  Negative except for above.      Objective:     Vitals:  /79 (BP Location: Right arm, Patient Position: Sitting, Cuff Size: Adult Large)   Pulse 76   Temp 97.6  F (36.4  C) (Temporal)   Resp 16   Wt 105.5 kg (232 lb 8 oz)   SpO2 98%   BMI 37.53 kg/m      Allergies:  No Known Allergies    Current Medications:  Scheduled:  Current Facility-Administered Medications   Medication Dose Route Frequency Provider Last Rate Last Admin    acetaminophen (TYLENOL) tablet 650 mg  650 mg Oral Q4H PRN Nikki Caceres MD   650 mg at 11/05/24 0905    alum & mag hydroxide-simethicone (MAALOX) suspension 30 mL  30 mL Oral Q4H PRN Nikki Caceres MD   30 mL at 10/17/24 0837    amLODIPine (NORVASC) tablet 10 mg  10 mg Oral Daily Dishno, Presley, MD   10 mg at 11/07/24 0833    atorvastatin (LIPITOR) tablet 40 " mg  40 mg Oral Daily Nikki Caceres MD   40 mg at 11/07/24 0833    cholecalciferol (VITAMIN D3) capsule 1,250 mcg  1,250 mcg Oral Q7 Days Evelia Grimm DO   1,250 mcg at 11/05/24 1234    cinacalcet (SENSIPAR) tablet 30 mg  30 mg Oral Daily Presley Barrera MD   30 mg at 11/07/24 0833    cloNIDine (CATAPRES) tablet 0.1 mg  0.1 mg Oral BID Presley Barrera MD   0.1 mg at 11/07/24 2032    furosemide (LASIX) tablet 40 mg  40 mg Oral Daily Presley Barrera MD   40 mg at 11/07/24 0833    gabapentin (NEURONTIN) capsule 100 mg  100 mg Oral Q6H PRN Nikki Caceres MD        lisinopril (ZESTRIL) tablet 40 mg  40 mg Oral Daily Presley Barrera MD   40 mg at 11/07/24 0833    melatonin tablet 3 mg  3 mg Oral At Bedtime Presley Barrera MD   3 mg at 11/07/24 2032    metoprolol succinate ER (TOPROL XL) 24 hr tablet 50 mg  50 mg Oral Daily Presley Barrera MD   50 mg at 11/07/24 0833    nicotine (NICODERM CQ) 14 MG/24HR 24 hr patch 1 patch  1 patch Transdermal Daily Evelia Grimm DO   1 patch at 11/07/24 0833    nicotine (NICODERM CQ) 21 MG/24HR 24 hr patch 1 patch  1 patch Transdermal Daily PRN Britt Kang MD   1 patch at 10/13/24 1611    nicotine (NICORETTE) gum 2 mg  2 mg Buccal Q1H PRN González Garcia MD   2 mg at 10/24/24 1254    OLANZapine (zyPREXA) tablet 5 mg  5 mg Oral TID PRN Evelia Grimm DO   5 mg at 10/24/24 1101    Or    OLANZapine (zyPREXA) injection 5 mg  5 mg Intramuscular TID PRN Evelia Grimm DO        OLANZapine zydis (zyPREXA) ODT half-tab 2.5 mg  2.5 mg Oral QAM Presley Barrera MD   2.5 mg at 11/07/24 0833    OLANZapine zydis (zyPREXA) ODT tab 5 mg  5 mg Oral At Bedtime González Garcia MD   5 mg at 11/07/24 2031    polyethylene glycol (MIRALAX) Packet 17 g  17 g Oral Daily PRN Nikki Caceres MD        traZODone (DESYREL) half-tab 25 mg  25 mg Oral At Bedtime PRN Evelia Grimm DO   25 mg at 11/04/24 0141    Or    traZODone (DESYREL) tablet 50 mg  50 mg Oral At Bedtime PRN Evelia Grimm DO          PRN:  Current Facility-Administered Medications   Medication Dose Route Frequency Provider Last Rate Last Admin    acetaminophen (TYLENOL) tablet 650 mg  650 mg Oral Q4H PRN Nikki Caceres MD   650 mg at 11/05/24 0905    alum & mag hydroxide-simethicone (MAALOX) suspension 30 mL  30 mL Oral Q4H PRN Nikki Caceres MD   30 mL at 10/17/24 0837    amLODIPine (NORVASC) tablet 10 mg  10 mg Oral Daily Presley Barrera MD   10 mg at 11/07/24 0833    atorvastatin (LIPITOR) tablet 40 mg  40 mg Oral Daily Nikki Caceres MD   40 mg at 11/07/24 0833    cholecalciferol (VITAMIN D3) capsule 1,250 mcg  1,250 mcg Oral Q7 Days Evelia Grimm DO   1,250 mcg at 11/05/24 1234    cinacalcet (SENSIPAR) tablet 30 mg  30 mg Oral Daily Presley Barrera MD   30 mg at 11/07/24 0833    cloNIDine (CATAPRES) tablet 0.1 mg  0.1 mg Oral BID Presley Barrera MD   0.1 mg at 11/07/24 2032    furosemide (LASIX) tablet 40 mg  40 mg Oral Daily Presley Barrera MD   40 mg at 11/07/24 0833    gabapentin (NEURONTIN) capsule 100 mg  100 mg Oral Q6H PRN Nikki Caceres MD        lisinopril (ZESTRIL) tablet 40 mg  40 mg Oral Daily Presley Barrera MD   40 mg at 11/07/24 0833    melatonin tablet 3 mg  3 mg Oral At Bedtime Presley Barrera MD   3 mg at 11/07/24 2032    metoprolol succinate ER (TOPROL XL) 24 hr tablet 50 mg  50 mg Oral Daily Presley Barrera MD   50 mg at 11/07/24 0833    nicotine (NICODERM CQ) 14 MG/24HR 24 hr patch 1 patch  1 patch Transdermal Daily Evelia Grimm DO   1 patch at 11/07/24 0833    nicotine (NICODERM CQ) 21 MG/24HR 24 hr patch 1 patch  1 patch Transdermal Daily PRN Britt Kang MD   1 patch at 10/13/24 1611    nicotine (NICORETTE) gum 2 mg  2 mg Buccal Q1H PRN González Garcia MD   2 mg at 10/24/24 1254    OLANZapine (zyPREXA) tablet 5 mg  5 mg Oral TID PRN Evelia Grimm DO   5 mg at 10/24/24 1101    Or    OLANZapine (zyPREXA) injection 5 mg  5 mg Intramuscular TID PRN Evelia Grimm DO        OLANZapine zydis  "(zyPREXA) ODT half-tab 2.5 mg  2.5 mg Oral QAM Presley Barrera MD   2.5 mg at 11/07/24 0833    OLANZapine zydis (zyPREXA) ODT tab 5 mg  5 mg Oral At Bedtime González Garcia MD   5 mg at 11/07/24 2031    polyethylene glycol (MIRALAX) Packet 17 g  17 g Oral Daily PRN Nikki Caceres MD        traZODone (DESYREL) half-tab 25 mg  25 mg Oral At Bedtime PRN Evelia Grimm DO   25 mg at 11/04/24 0141    Or    traZODone (DESYREL) tablet 50 mg  50 mg Oral At Bedtime PRN Evelia Grimm DO         Labs and Imaging:    Data this admission:  - CBC unremarkable  - CMP unremarkable  - TSH normal  - UDS negative  - Vit D low  - Hgb A1c 5.9 (10/13/24)  - Lipids unremarkable  - Vit B12 normal  - Folate normal  - Urinalysis unremarkable  - EKG normal sinus rhythm, QTc 390  - Head CT showed no acute changes  - HIV non reactive  - Treponema antibody non reactive  - ESR wnl   - Ceruloplasmin wnl   - BOOGIE negative  - Lyme negative      Parathyroid hormone:   10/13: 85     Calcium:  10/14: 11.4  10/16: 10.3  10/17: 10.3  10/19: 9.8  10/21: 10.6  10/23: 10.3     Albumin:  10/14: 4.3  10/16: 4.3  10/17: 4.1  10/19: 4.2  10/21: 4.5  10/23: 4.3      MRI:   IMPRESSION:  1. No acute intracranial pathology.  2. No focal lesion or structural abnormality.   3. Symmetric frontoparietal cortical volume loss slightly more than  expected for age.     Mental Status Exam:     Oriented to:  Oriented to self and day, not oriented to location or situation  General:  Awake and Confused  Appearance:  appears stated age and Dressed in his own clothes   Behavior/Attitude:  Calm, Cooperative, and Indifferent  Eye Contact: Appropriate  Psychomotor: No evidence of tics, dystonia, or tardive dyskinesia  no catatonia present  Speech:  appropriate volume/tone  Language: Fluent in English with appropriate syntax and vocabulary.  Mood:  \"good\"  Affect:  congruent with mood  Thought Process:  looseness of association, disorganized, and confused  Thought Content:   Did " not assess SI/HI/AH/VH today; no apparent delusions  Associations:  loose  Insight:  impaired   Judgment:  partial   Impulse control: limited  Attention Span:  decreased  Concentration:  distractible  Recent and Remote Memory:  recent memory impaired, remote memory intact  Fund of Knowledge: average  Muscle Strength and Tone: normal  Gait and Station: Normal     Psychiatric Assessment     Estevan Aaron is a 64 year old male with previous psychiatric diagnoses of GEORGINA admitted from the ER on 10/12/2024 due to concern for HI and psychosis in the context of medical issues (hyperthyroidism, hypercalcemia), psychosocial stressors including with recent divorce and selling his home. This is the patient's first psychiatric hospitalization. Significant symptoms on admission included delusions of persecution with grandiose beliefs, homicidal ideations, as well as disorganized thinking and behavior. The MSE on admission was pertinent for a thought process which was perseverative, circumstantial, tangential, disorganized and tangential; with rambling and looseness of associations. Psychological contributions to presentation included lack of insight. Social factors contributing to presentation included isolation, recent divorce, selling his house, and moving from hotel to hotel. Biological contributions to presentation included a history of hyperparathyroidism with chronic hypercalcemia per charts as well as a history of methamphetamine use per collateral from ex-wife.     History was difficult to obtain due to the patient's severe disorganized thinking on interview; he was observed with persecutory delusions pertaining to the realtor and gang members, disorganized behavior as these delusions have led him to flee to different hotels to stay safe/ has called  complaining of being targeted and auditory hallucinations of voices for the past 12 months. Per collateral from ex-wife, Santos has had paranoid ideations since they first  met. He has always felt like people are out to get him or trying to rip him off. She says that he has had visual hallucinations (he will point things out that the rest of the family can't see) for a long time, but the family would just go along with him to avoid making him angry. This timeline and presentation could be consistent with diagnosis of paranoid personality disorder. Things began to worsen around the beginning of the COVID pandemic, when Santos became more isolated and the family started to notice cognitive issues such as impaired memory. Immediately prior to this hospitalization, the ex-wife found Santos in a hotel room with a generator full of gasoline, binoculars, and hunting equipment. This time course does not suggest acute psychosis, however given the patient has never had a full psychiatric work-up, we completed a first-episode psychosis work-up.      Other things that could be contributing to his presentation is hyperparathyroidism with hypercalcemia. However, patient's calcium returned to normal limits on 10/16, and patient continues to have psychosis so likely not a significant contributing factor to patient's presentation.      Patient also continues to be disoriented and his behaviors appear to worsen in the evenings. This raised concern for underlying neurocognitive disorder with delirium. Patient received MRI brain with and without contrast which showed frontal and parietal atrophy greater than would be expected for patient's age. This is consistent with neurocognitive disorder. Patient has continued to improve with decreasing his antipsychotic. Working to get patient transferred to geriatric psychiatry in the short term and to memory care long term.      Given that he currently has psychosis, patient warrants inpatient psychiatric hospitalization to maintain his safety.     Psychiatric Plan by Diagnosis      Today's changes:  - None     # Major Neurocognitive Disorder  # Rule out paranoid  personality disorder  1. Medications:  - Discontinued PTA fluoxetine 40 mg, consider restarting at a later time   - Olanzapine 2.5 mg during day and 5 mg at bedtime  - Neurology consult 11/8/24     2. Pertinent Labs/Monitoring:   - See above     3. Additional Plans:  - Patient will be treated in therapeutic milieu with appropriate individual and group therapies as described     # Unspecified anxiety vs Generalized Anxiety Disorder  - Monitor for symptoms.  - Fluoxetine held due to suspicion of ongoing manic symptoms     Psychiatric Hospital Course:      Estevan Aaron was admitted to [facility] on a 72 hour hold.   Medications:  PTA fluoxetine was held due to concern for worsening of zelalem   New medications started at the time of admission include Zyprexa.   Increased olanzapine 10 mg at bedtime was to 10 mg BID (10/14)   Increased olanzapine 10 mg BID to 10 mg during day and 15 mg at bedtime (10/15)  10/21: increased olanzapine pm dose from 15 to 20 mg  10/23: started melatonin 3 mg at 7 pm to help patient with circadian rhythm as he has been staying up throughout the night and sleeping a lot during the day and there is some concern for delirium   10/24: consulted anesthesia for MRI brain   10/28: MRI brain complete, decrease AM olanzapine from 10 to 5 mg  10/29: Morning olanzapine decreased to 2.5 mg, evening olanzapine decreased to 15 mg   11/1: Decreased evening olanzapine to 10 mg   11/4: Decreased evening olanzapine to 7.5 mg   11/5: Decreased evening olanzapine to 5 mg      Care conference 10/18/24 with daughter Maria C and ex-wife Clifford  - Report that patient has always been paranoid since ex-wife first met him. She describes him always feeling like he is getting ripped off.   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 They reported once incidence in which he made a bid on a job and the client paid him, but he never ended up finishing the job  - Wife and him  officially this year, and since selling the house several months ago, patient has been moving from hotel to hotel due to thinking people are out to get him   - When they were selling their house, patient threatened realtors and felt he was being ripped off   - Clifford reports that she started to be afraid to be around him alone  - When he was found in the hotel, he had a generator full of gasoline, binoculars, bullets, and hunting equipment.     10/21/24: updated daughter on Santos's treatment plan      10/23/24: updated daughter on Santos's treatment plan      10/25/24: updated daughter on Santos's treatment plan, including plan to try and get MRI brain done under sedation. She said that Santos's grandfather had dementia and his sister has a brain tumor.      10/28/24: updated daughter on Santos's MRI results. She is planning on coming into town soon.      Care conference 11/1/24 with daughters Maria C and Estefani and ex-wife Clifford  - Discussed dementia diagnosis   - Clifford asked about plans moving forward. Explained that applying for medical assistance is the first step. Explained that application processing time can be very variable. Informed family that Santos will most likely be staying on this inpatient unit during this time.   - Clifford expressed that their family would like to be the power of /have conservatorship for Santos.   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 Reports that he has bills that he has not paid.   - Maria C is planning to move to [place], and Clifford  currently lives in [place]. Family prefer that Santos would in a facility that are near these locations. Discussed with family that they can also try to request a specific location (memory care).   - Clifford reports that he had previously gotten help applying for his social security and medicare, but unsure if it had been approved.   - Informed family that there is a geriatric unit and there might be a transfer if there becomes availability on this unit.   - Family shared that he was completely different just two months ago.   - Estefani shared that his family found his medications in his old truck recently, expressed that it was likely that he was not taking his medications prior to his hospitalization.     11/6/24: updated Maria C on plan to try and transfer Santos to Geriatric unit.      The risks, benefits, alternatives, and side effects were discussed and understood by the patient and other caregivers.     Medical Assessment and Plan     Medical diagnoses to be addressed this admission:    # Hypertension  - Continue PTA medications  Furosemide 40 mg daily  Lisinopril 40 mg daily  Metoprolol 50 mg daily  Amlodipine 10 mg daily  Clonidine 0.1 mg BID     # Hyperlipidemia  - Continue PTA Atorvastatin 40 mg     # Hyperparathyroidism  # Hypercalcemia, hypophosphoremia   Increased Ca level to 10.9 and decreased Ph level to 2.3 in the ED. Suspicion patient's hypercalcemia could be contributing to symptoms of psychosis.  - Consulted endocrinology, who started cinacalcet 30 mg BID on 10/13  - 10/16 endocrinology recommended continuing to trend calcium and albumin to make sure patient does not become hypocalcemic and recommended holding cinacalcet   - 10/17, calcium and albumin wnl, endo recommended cinacalcet 30 mg once daily   - 10/21, per endo continue cincaclcet and recheck calcium and albumin on October 24  - 10/23: per endo, patient needs to follow up outpatient for further management of hyperparathyroidism      Medical  course: Patient was physically examined by the ED prior to being transferred to the unit and was found to be medically stable and appropriate for admission.      Consults: Psychiatry, Endocrinology (follow-up of hyperthyroidism / hypercalcemia and hypertension), neurology     Checklist     Legal Status: Committed with [facility]     Safety Assessment:   Behavioral Orders   Procedures    Assault precautions    Code 1 - Restrict to Unit    Routine Programming     As clinically indicated    Status 15     Every 15 minutes.    Status Individual Observation     1:1 during evening shift, & night shift.    If patient refuses overnight presence of staff in his room, it's ok to do 15 min checks through the window blinds.    Patient SIO status reviewed with team/RN.  Please also refer to RN/team documentation for add'l detail.    -SIO staff to monitor following which have contributed to patient being on SIO:  Pt has psychosis regarding being followed and attacked, very paranoid, HI    -Possible interventions SIO staff could use to support patient's treatment progress:  Redirect and reassure  -When following observed, team will review discontinuation of SIO:  Psychosis improves     Order Specific Question:   CONTINUOUS 24 hours / day     Answer:   Other     Order Specific Question:   Specify distance     Answer:   10 feet, 5 feet when close to the doors     Order Specific Question:   Indications for SIO     Answer:   Assault risk     Order Specific Question:   Indications for SIO     Answer:   Severe intrusiveness     Risk Assessment:  Risk for harm is elevated.  Risk factors: impulsive and past behaviors  Protective factors: family     SIO: 1:1 during evening and night shift     Disposition: Pending stabilization, medication optimization, & development of a safe discharge plan.      Attestations     This patient was seen and discussed with my attending physician.  Presley Barrera MD  PGY-1 Psychiatry Resident    Attestation:  This  patient has been seen and evaluated by me, Pete Smith MD.  I have discussed this patient with the house staff team including the resident and/or medical student and I agree with the findings and plan in this note.    I have reviewed today's vital signs, medications, labs and imaging. Pete Smith MD , PhD.

## 2024-11-08 NOTE — TELEPHONE ENCOUNTER
R: PT NEEDS TRANSFER TO UNIT 3B  2:58p Received notification from [facility] that pt is under review for Unit 3B, awaiting response.  R: Per Zayda on 3b at 3:03 pm, pt is declined due to needing an SIO and unit is capped for SIO's. Author informed Yara in intake.   [facility] Access Inpatient Bed Call Log 11/8/2024 8:23:59 AM  [facility] has called facilities that have not updated the bed status within the last 12 hours.         (Adults);        [facility]:                [facility] is posting 0 beds.                 Pt remains on the work list pending appropriate bed availability.

## 2024-11-09 ENCOUNTER — TELEPHONE (OUTPATIENT)
Dept: BEHAVIORAL HEALTH | Facility: CLINIC | Age: 64
End: 2024-11-09
Payer: COMMERCIAL

## 2024-11-09 PROCEDURE — 124N000002 HC R&B MH UMMC

## 2024-11-09 PROCEDURE — 99254 IP/OBS CNSLTJ NEW/EST MOD 60: CPT | Performed by: PSYCHIATRY & NEUROLOGY

## 2024-11-09 PROCEDURE — 250N000013 HC RX MED GY IP 250 OP 250 PS 637

## 2024-11-09 RX ADMIN — OLANZAPINE 5 MG: 5 TABLET, ORALLY DISINTEGRATING ORAL at 19:20

## 2024-11-09 RX ADMIN — CLONIDINE HYDROCHLORIDE 0.1 MG: 0.1 TABLET ORAL at 08:05

## 2024-11-09 RX ADMIN — METOPROLOL SUCCINATE 50 MG: 50 TABLET, EXTENDED RELEASE ORAL at 08:05

## 2024-11-09 RX ADMIN — CINACALCET 30 MG: 30 TABLET ORAL at 08:04

## 2024-11-09 RX ADMIN — OLANZAPINE 2.5 MG: 5 TABLET, ORALLY DISINTEGRATING ORAL at 08:05

## 2024-11-09 RX ADMIN — Medication 1 PATCH: at 08:05

## 2024-11-09 RX ADMIN — Medication 3 MG: at 19:19

## 2024-11-09 RX ADMIN — FUROSEMIDE 40 MG: 40 TABLET ORAL at 08:05

## 2024-11-09 RX ADMIN — ATORVASTATIN CALCIUM 40 MG: 20 TABLET, FILM COATED ORAL at 08:04

## 2024-11-09 RX ADMIN — LISINOPRIL 40 MG: 10 TABLET ORAL at 08:05

## 2024-11-09 RX ADMIN — AMLODIPINE BESYLATE 10 MG: 5 TABLET ORAL at 08:05

## 2024-11-09 RX ADMIN — CLONIDINE HYDROCHLORIDE 0.1 MG: 0.1 TABLET ORAL at 19:20

## 2024-11-09 ASSESSMENT — ACTIVITIES OF DAILY LIVING (ADL)
ADLS_ACUITY_SCORE: 0
LAUNDRY: UNABLE TO COMPLETE
ADLS_ACUITY_SCORE: 0
ADLS_ACUITY_SCORE: 0
ORAL_HYGIENE: INDEPENDENT;PROMPTS
ADLS_ACUITY_SCORE: 0
LAUNDRY: UNABLE TO COMPLETE
ADLS_ACUITY_SCORE: 0
DRESS: INDEPENDENT;STREET CLOTHES
HYGIENE/GROOMING: INDEPENDENT
ADLS_ACUITY_SCORE: 0
DRESS: INDEPENDENT;STREET CLOTHES
ORAL_HYGIENE: INDEPENDENT;PROMPTS
HYGIENE/GROOMING: INDEPENDENT
ADLS_ACUITY_SCORE: 0

## 2024-11-09 NOTE — TELEPHONE ENCOUNTER
R: [place]  Access Inpatient Bed Call Log 11/8/2024 4:45 PM  Intake has called facilities that have not updated the bed status within the last 12 hours.         (3B ONLY);        [facility] is posting 0 beds.          Pt remains on the work list pending appropriate bed availability.

## 2024-11-09 NOTE — TELEPHONE ENCOUNTER
[facility] ONLY: 3B Unit  R: [place] [facility] Access Inpatient Bed Call Log  11/9/24 @ 1:00am    [facility]: @ capacity for Adult MH beds.     Pt remains on waitlist pending appropriate placement availability.

## 2024-11-09 NOTE — CONSULTS
[facility]  Neurology Consult[facility]    Patient Name:  Estevan Aaron  MRN:  [id number]    :  1960  Date of Service:  2024  Primary care provider:  Estevan Blair      Neurology consultation service was asked to see Estevan Aaron by Dr. Barrera to evaluate neurocogntive disorder.    Chief Complaint:  he denies complaints    History of Present Illness:   Estevan Aaron is a 64 year old male with history of kidney stone, elevated calcium who presents with homicidal ideations and psychosis in the setting of psychosocial stressors and medical issues.  History is obtained primarily through chart as patient's history is not entirely reliable when corroborated.  He does report that he went through a separation couple months ago.  Since then it appears there is been more paranoia.  He has been living in hotels, and concerned that he is going to be attacked.  He was noted to have tangential thinking and thought brought going when he was brought to the ED by police.  Patient was then unable to engage in safety plan and ultimately had to be transferred to inpatient psychiatry.  His only previous psychiatric diagnoses were anxiety, and he had not been psychiatrically stabilized before.  Symptoms on admission included delusions of persecution with grandiose beliefs, homicidal ideations, disorganized behavior and thinking.  Patient was escalated on antipsychotics.  However, most recent psychiatry notes and notes that his behaviors are improving with decreasing doses of antipsychotics.   Psychiatry team noted that his behaviors seem to worsen in the evenings and were concerned for an underlying neurocognitive disorder.  He got an MRI that showed frontoparietal cortical volume loss more than expected for age.  They have been having care conferences and discussion with family regarding concern for additional assistance.      Patient tells me he feels well currently and denies  headache, vision changes, numbness, weakness, chest pain, shortness of breath, nausea, vomiting.      PMH  Past Medical History:   Diagnosis Date    Kidney stone     Serum calcium elevated     Tobacco use disorder     tobacco quit line referral done 4/19/06     Past Surgical History:   Procedure Laterality Date    ANESTHESIA OUT OF OR MRI N/A 10/28/2024    Procedure: 1.5 MRI Brain;  Surgeon: GENERIC ANESTHESIA PROVIDER;  Location: [facility] OR    ROTATOR CUFF REPAIR RT/LT Right 2021       Medications   I have personally reviewed the patient's medication list.     Allergies  I have personally reviewed the patient's allergy list.     Social History  Quit tobacco many years ago.  Denies alcohol or drugs.  Worked as a .    Family History    States both his grandfather had dementia but is unclear what type.      Physical Examination   Vitals: BP (!) 156/80 (BP Location: Right arm, Patient Position: Sitting)   Pulse 109   Temp 97.8  F (36.6  C) (Oral)   Resp 18   Wt 105.5 kg (232 lb 8 oz)   SpO2 96%   BMI 37.53 kg/m    General: Sitting in chair NAD  Head: NC/AT  Eyes: no icterus, op pink and moist  Cardiac: Extremities warm, no edema.   Respiratory: non-labored on RA  Skin: No rash or lesion on exposed skin  Psych: Mood pleasant, thought processes are tangential but not responding to internal stimuli.  Neuro:  Mental status: Awake, alert, attentive, oriented to self, and year, states month is October.  Unable to tell me where he is and states he is here because of his daughters.  Able to name simple objects.  Able to follow multistep commands.  Has difficulty with calculating quarters.    Cranial nerves: VFF, PERRL, conjugate gaze, EOMI, facial sensation intact, face symmetric, shoulder shrug strong, tongue/uvula midline, no dysarthria.   Motor: Normal bulk and tone. No abnormal movements. 5/5 strength bilaterally in deltoids, biceps, triceps, hand , hip flexors, hip extensors, knee flexion, knee extension,  plantarflexion, dorsiflexion.   Reflexes: Normo-reflexic and symmetric biceps, brachioradialis, triceps, patellae, and achilles.  no clonus,   Sensory: Intact to light touch throughout.  No sensory extinction.  Negative Romberg   coordination: FNF intact without dysmetria   gait: Normal width, stride length, turn, and arm swing.   Tandem walk intact     investigations   I have personally reviewed pertinent labs, tests, and radiological imaging. Discussion of notable findings is included under Impression.     MRI brain personally reviewed: There is subtle cortical atrophy in the frontal lobes.  No hydrocephalus seen.  Few scattered T2 hyperintensities with no significant susceptibility weighted changes.  Mild T2 changes seen in the cerebellum on the left    Was patient transferred from outside hospital?   No    Impression  #Neurocognitive disorder  #psychosis    Ms. Aaron is a 64-year-old male who neurology is consulted for neurocognitive disorder.  It is not possible to formally diagnose neurocognitive disorder in the setting of psychosis.  He does not manifest overt signs of parkinsonism to suggest Lewy body disease.  Possible early frontotemporal dementia given that there appears to be a significant personality change.  His MRI is not particularly impressive or suggestive of any clear entity.  At this time would recommend continuing to optimize treatment of his psychosis and he should follow-up with neurology outpatient to consider outpatient neuropsych testing for more formal diagnosis when his psychiatric symptoms are stabilized.    Recommendations  -- OUtpaitent neurology referral and neuropsych testing  -- Will sign off    Thank you for involving Neurology in the care of Estevan Aaron.     Devan Scott, DO      Medical Decision Making       MANAGEMENT DISCUSSED with the following over the past 24 hours: psych resident   NOTE(S)/MEDICAL RECORDS REVIEWED over the past 24 hours: psych h&p and progress  notes  Tests personally interpreted in the past 24 hours:  - BRAIN MRI showing as above  Tests ORDERED & REVIEWED in the past 24 hours:  -Ferritin, heavy metal panel, COVID, ceruloplasmin, ESR, treponema, HIV, Lyme, B12, folate, vitamin D, UA, free T4, thiamine reviewed

## 2024-11-09 NOTE — TELEPHONE ENCOUNTER
R: [place]  [place] Inpatient Bed Call Log 11/9/2024 3:15PM  Intake has called facilities that have not updated the bed status within the last 12 hours.         (Adults);          [facility] ONLY- Transfer:                [facility] is posting 0 beds.                Pt remains on the work list pending appropriate bed availability.

## 2024-11-09 NOTE — PLAN OF CARE
Problem: Psychotic Signs/Symptoms  Goal: Improved Behavioral Control (Psychotic Signs/Symptoms)  Outcome: Progressing  Intervention: Manage Behavior  Recent Flowsheet Documentation  Taken 11/9/2024 1738 by Tamie Fraser RN  De-Escalation Techniques: verbally redirected     Problem: Anxiety  Goal: Anxiety Reduction or Resolution  Outcome: Progressing   Goal Outcome Evaluation:    Pt was up pacing the hallway  socializing with staff. Affect flat but brightened upon approach.Pt denied pain or discomfort. Denies all mental health and psych symptoms. Pt slide through the door into the man trap when the pharmacy tech was leaving the unit. He was redirected back to the unit without issues. Pt is eating and drink adequately. Continous on SIO 1:1 for Assault risk and severe intrusiveness. Pt was cooperative and compliant with medications. No escalation of behavior.

## 2024-11-09 NOTE — PLAN OF CARE
Goal Outcome Evaluation:    Plan of Care Reviewed With: patient     Patient affect was flat and blunted but pleasant upon approach. His mood was calm. Patient was compliant with medication. He was pleasant throughout the shift. His hygiene was unkept. He is eating and drinking adequately. Patient denied all MH psych symptoms and contracted for safety. He denied pain or any discomfort. There was no safety or behavioral concerns.

## 2024-11-09 NOTE — PLAN OF CARE
Problem: Sleep Disturbance  Goal: Adequate Sleep/Rest  Outcome: Progressing     Patient alert, confused, agitated at beginning of the shift. Patient paced the hallway, declined to take sleep medication when offered. Patient was easily redirectable. Patient slept for 5.75 hours this shift. No psyche symptoms observed this shift. Patient continuous on 1:1 SIO on evening and night shift.  No complaints of pain and discomfort. Patient continues on q15 minutes safety checks. Will continue to monitor the patient and provide therapeutic intervention as needed. Will continue with current plan of care. Notify MD with any concerns.

## 2024-11-09 NOTE — TELEPHONE ENCOUNTER
R: 9:33AM- Pt is Vol and pending a transfer to  when an appropriate bed becomes available.      Pt remains on the waitlist.

## 2024-11-10 ENCOUNTER — TELEPHONE (OUTPATIENT)
Dept: BEHAVIORAL HEALTH | Facility: CLINIC | Age: 64
End: 2024-11-10
Payer: COMMERCIAL

## 2024-11-10 PROCEDURE — 124N000002 HC R&B MH UMMC

## 2024-11-10 PROCEDURE — 250N000013 HC RX MED GY IP 250 OP 250 PS 637

## 2024-11-10 RX ADMIN — Medication 1 PATCH: at 07:52

## 2024-11-10 RX ADMIN — OLANZAPINE 2.5 MG: 5 TABLET, ORALLY DISINTEGRATING ORAL at 07:51

## 2024-11-10 RX ADMIN — AMLODIPINE BESYLATE 10 MG: 5 TABLET ORAL at 07:52

## 2024-11-10 RX ADMIN — METOPROLOL SUCCINATE 50 MG: 50 TABLET, EXTENDED RELEASE ORAL at 07:52

## 2024-11-10 RX ADMIN — CLONIDINE HYDROCHLORIDE 0.1 MG: 0.1 TABLET ORAL at 07:52

## 2024-11-10 RX ADMIN — ATORVASTATIN CALCIUM 40 MG: 20 TABLET, FILM COATED ORAL at 07:51

## 2024-11-10 RX ADMIN — LISINOPRIL 40 MG: 10 TABLET ORAL at 07:52

## 2024-11-10 RX ADMIN — CLONIDINE HYDROCHLORIDE 0.1 MG: 0.1 TABLET ORAL at 20:08

## 2024-11-10 RX ADMIN — CINACALCET 30 MG: 30 TABLET ORAL at 07:52

## 2024-11-10 RX ADMIN — Medication 3 MG: at 20:08

## 2024-11-10 RX ADMIN — OLANZAPINE 5 MG: 5 TABLET, ORALLY DISINTEGRATING ORAL at 20:08

## 2024-11-10 RX ADMIN — FUROSEMIDE 40 MG: 40 TABLET ORAL at 07:52

## 2024-11-10 NOTE — PLAN OF CARE
Goal Outcome Evaluation:               Problem: Adult Behavioral Health Plan of Care  Goal: Adheres to Safety Considerations for Self and Others  Outcome: Progressing  Intervention: Develop and Maintain Individualized Safety Plan  Recent Flowsheet Documentation  Taken 11/10/2024 1713 by Tamie Fraser RN  Safety Measures: environmental rounds completed     Problem: Psychotic Signs/Symptoms  Goal: Improved Behavioral Control (Psychotic Signs/Symptoms)  Outcome: Progressing  Intervention: Manage Behavior  Recent Flowsheet Documentation  Taken 11/10/2024 1713 by Tamie Fraser RN  De-Escalation Techniques: verbally redirected     Pt was visible in the lounge  majority of the shift watching TV. He was observed pacing the hallway intermittently and interacting with staff and peers. Pt mood  and behavior has improved. Pt is eating and drinking adequately. He denied all mental health and psych symptoms. Pt was compliant with medications. No behavior escalation noted this shift. No safety issues. Pt continuous on SIO 1:1 for severe intrusivness and Assault risk.

## 2024-11-10 NOTE — TELEPHONE ENCOUNTER
R: [facility] 3B ONLY    9:47a Called Unit 20 LENNIE Pat to inquire if pt is on SIO d/t unit 3B being unable to accommodate SIO's per their Nurse Manager. CRN informed pt is not on an SIO during days, but is on an SIO during evening and nights.     [facility] Access Inpatient Bed Call Log 11/10/24 @8:02 AM:   Intake has called facilities that have not updated the bed status within the last 12 hours.                                                      [facility] is at capacity.                 Pt remains on the work list pending appropriate bed availability.

## 2024-11-10 NOTE — TELEPHONE ENCOUNTER
[facility] ONLY: 3B Unit  R: [place] [facility] [place]patient [place] Call [place]  11/10/24 @ 1:00am    [facility]: @ capacity for Adult MH beds.     Pt remains on waitlist pending appropriate placement availability.

## 2024-11-10 NOTE — PLAN OF CARE
"  Problem: Adult Behavioral Health Plan of Care  Goal: Plan of Care Review  Outcome: Progressing  Flowsheets  Taken 11/10/2024 1140  Plan of Care Reviewed With: patient  Patient Agreement with Plan of Care: agrees  Taken 11/10/2024 0900  Patient Agreement with Plan of Care: agrees  Goal: Patient-Specific Goal (Individualization)  Description: You can add care plan individualizations to a care plan. Examples of Individualization might be:  \"Parent requests to be called daily at 9am for status\", \"I have a hard time hearing out of my right ear\", or \"Do not touch me to wake me up as it startles  me\".  Outcome: Progressing  Goal: Adheres to Safety Considerations for Self and Others  Outcome: Progressing  Intervention: Develop and Maintain Individualized Safety Plan  Recent Flowsheet Documentation  Taken 11/10/2024 0900 by Vera Graham RN  Safety Measures: environmental rounds completed  Goal: Absence of New-Onset Illness or Injury  Outcome: Progressing  Intervention: Identify and Manage Fall Risk  Recent Flowsheet Documentation  Taken 11/10/2024 0900 by Vera Graham RN  Safety Measures: environmental rounds completed  Intervention: Prevent VTE (Venous Thromboembolism)  Recent Flowsheet Documentation  Taken 11/10/2024 0900 by Vera Graham RN  VTE Prevention/Management: SCDs off (sequential compression devices)  Goal: Optimized Coping Skills in Response to Life Stressors  Outcome: Progressing  Intervention: Promote Effective Coping Strategies  Recent Flowsheet Documentation  Taken 11/10/2024 0900 by Vera Graham RN  Supportive Measures:   verbalization of feelings encouraged   positive reinforcement provided   active listening utilized  Goal: Develops/Participates in Therapeutic Assaria to Support Successful Transition  Outcome: Progressing  Intervention: Foster Therapeutic Assaria  Recent Flowsheet Documentation  Taken 11/10/2024 0900 by Vera Graham RN  Trust " Relationship/Rapport:   choices provided   emotional support provided   empathic listening provided   questions encouraged   reassurance provided   thoughts/feelings acknowledged       Goal Outcome Evaluation:    Plan of Care Reviewed With: patient     Patient was visible in the milieu during the shift.. He was mostly keeping to himself and interacting more with the staff than peers. Patient spent a lot of time in his room today. He denied pain or any form of discomfort. He denied all psych and mental health symptoms and contracted for safety. He was medication compliant. He dressed properly for the whether.  His fluid and food intake is adequate. There was no safety or behavioral concerns. His VS were WNL and per patient's baseline.  /76 (BP Location: Left arm, Patient Position: Sitting, Cuff Size: Adult Regular)   Pulse 60   Temp 98.2  F (36.8  C) (Oral)   Resp 18   Wt 105.5 kg (232 lb 8 oz)   SpO2 98%   BMI 37.53 kg/m

## 2024-11-10 NOTE — PLAN OF CARE
Problem: Sleep Disturbance  Goal: Adequate Sleep/Rest  Outcome: Progressing   Patient woke up a few times this shift but sleeping most of the shift. No complaints of pain and discomfort. Patient continues on q15 minutes safety checks, no behavioral issues noted or any safety concerns this shift. Patient on status individual observation with 1 staffing, and space restriction for safety, to prevent/manage severe intrusiveness, assaultive behavior, to mitigate safety risks.  Will continue to monitor the patient and provide therapeutic intervention as needed. Will continue with current plan of care. Notify MD with any concerns. The patient had 6.25 total hours of sleep this shift.

## 2024-11-10 NOTE — TELEPHONE ENCOUNTER
[place]: [place] Inpatient Bed Call Log 11/10/24 @3:23 PM:   Intake has called facilities that have not updated the bed status within the last 12 hours.                                                  [facility] is at capacity.               Pt remains on the work list pending appropriate bed availability.

## 2024-11-11 ENCOUNTER — TELEPHONE (OUTPATIENT)
Dept: BEHAVIORAL HEALTH | Facility: CLINIC | Age: 64
End: 2024-11-11
Payer: COMMERCIAL

## 2024-11-11 PROCEDURE — 124N000002 HC R&B MH UMMC

## 2024-11-11 PROCEDURE — 250N000013 HC RX MED GY IP 250 OP 250 PS 637

## 2024-11-11 PROCEDURE — 99232 SBSQ HOSP IP/OBS MODERATE 35: CPT | Mod: GC | Performed by: PSYCHIATRY & NEUROLOGY

## 2024-11-11 RX ADMIN — OLANZAPINE 5 MG: 5 TABLET, ORALLY DISINTEGRATING ORAL at 19:54

## 2024-11-11 RX ADMIN — LISINOPRIL 40 MG: 10 TABLET ORAL at 09:46

## 2024-11-11 RX ADMIN — OLANZAPINE 2.5 MG: 5 TABLET, ORALLY DISINTEGRATING ORAL at 14:24

## 2024-11-11 RX ADMIN — FUROSEMIDE 40 MG: 40 TABLET ORAL at 09:46

## 2024-11-11 RX ADMIN — AMLODIPINE BESYLATE 10 MG: 5 TABLET ORAL at 09:47

## 2024-11-11 RX ADMIN — Medication 3 MG: at 19:54

## 2024-11-11 RX ADMIN — ATORVASTATIN CALCIUM 40 MG: 20 TABLET, FILM COATED ORAL at 09:46

## 2024-11-11 RX ADMIN — CLONIDINE HYDROCHLORIDE 0.1 MG: 0.1 TABLET ORAL at 19:54

## 2024-11-11 RX ADMIN — Medication 1 PATCH: at 09:47

## 2024-11-11 RX ADMIN — CLONIDINE HYDROCHLORIDE 0.1 MG: 0.1 TABLET ORAL at 09:46

## 2024-11-11 RX ADMIN — CINACALCET 30 MG: 30 TABLET ORAL at 09:46

## 2024-11-11 RX ADMIN — METOPROLOL SUCCINATE 50 MG: 50 TABLET, EXTENDED RELEASE ORAL at 09:47

## 2024-11-11 ASSESSMENT — ACTIVITIES OF DAILY LIVING (ADL)
ADLS_ACUITY_SCORE: 0
ORAL_HYGIENE: INDEPENDENT;PROMPTS
ADLS_ACUITY_SCORE: 0
HYGIENE/GROOMING: INDEPENDENT
LAUNDRY: WITH SUPERVISION
ADLS_ACUITY_SCORE: 0
ORAL_HYGIENE: INDEPENDENT
ADLS_ACUITY_SCORE: 0
HYGIENE/GROOMING: INDEPENDENT;PROMPTS
DRESS: INDEPENDENT
ADLS_ACUITY_SCORE: 0
DRESS: INDEPENDENT
LAUNDRY: WITH SUPERVISION
ADLS_ACUITY_SCORE: 0

## 2024-11-11 NOTE — TELEPHONE ENCOUNTER
[facility] ONLY: 3B Unit  R: [place] [facility] Access [place]patient Bed Call Log  11/11/24 @ 1:00am     [facility]: @ capacity for Adult MH beds.      Pt remains on waitlist pending appropriate placement availability.

## 2024-11-11 NOTE — PLAN OF CARE
"Team Note Due:  Monday    Assessment/Intervention/Current Symtoms and Care Coordination:  Chart review and met with team, discussed pt progress, symptomology, and response to treatment.  Discussed the discharge plan and any potential impediments to discharge.    Writer met with pt who asked if I had heard from his ex-wife today. When I told him I hadn't spoken with Clifford today, pt then stated \"she's telling everyone I've been doing candy for the last 5 years.\" Pt states he now feels he has to prove himself to show he hasn't been doing drugs/cocaine. Writer attempted to assure pt the team hasn't been concerned about substance use and pt said \"well good, I have a job lined up in a week.\" Pt asked writer to let the team know he's not doing drugs and was assured this would be passed along.    Discharge Plan or Goal:  Memory care facility     Barriers to Discharge:  Patient requires further psychiatric stabilization due to current symptomology, medication management with changes subject to provider, coordination with outside supports, and aftercare planning. Pt is under civil commitment.     Referral Status:  None at this time    Family would like to consider the following Memory Care Facilities:  [facility] ([facility]     Legal Status:  [place] Commitment with [facility]  File Number: [id number]  Start and expiration date of commitment: 10/24/24 - 04/24/25    [facility] meds: Haldol, Clozaril, Risperdal, Invega, Zyprexa, Seroquel, Abilify    PPS/CM:  Shelby Chowdary: [phone]  [email or web address].    Contacts:  Maria C Aaron (Daughter): [phone]   Clifford Aaron (ex-wife): [phone]     No Del Angel (guardianship/conservatorship ): [phone]  [email or web address]     Upcoming Meetings and Dates/Important Information and next steps:  Update [facility] on when pt's BX GA plan will term  Follow up with " family regarding list of Memory Care facilities  Discharge planning when appropriate  Pt will need to apply for MA with financial counselors before a MNChoices Assessment/SMRT can be requested for waivers.    Provisional Discharge and Change of Status needed at discharge

## 2024-11-11 NOTE — PLAN OF CARE
Problem: Sleep Disturbance  Goal: Adequate Sleep/Rest  Outcome: Progressing  Patient alert, pleasantly confused. Patient sleeps on and of but sleeping most of the shift. Patient denies pain and discomfort.  Patient continues on q15 minutes safety checks, no behavioral issues noted. Patient continues to be on SIO with severe intrusiveness and assault risk . Patient appeared to be asleep for 5 hours during this shift. No concerns or complaints voiced by patient or noted by staff. Will continue to monitor  the patient and update MD if there are any concerns. Will continue to monitor the patient and provide therapeutic intervention as needed. Will continue with current plan of care.

## 2024-11-11 NOTE — PLAN OF CARE
"Pt has been visible in the milieu most of the shift, he is selectively social with peers. Pt has not had any behavioral concerns or elopements attempts this shift. He was initially pretty disoriented this morning and fixated on whether or not he got a job here, also stating that the team is trying to \"birth a baby out of him.\" Pt was med compliant without issue, verbalized being happy that one of his medications was moved to the afternoon. He denies all mental health symptoms upon assessment. No other concerns at this time.    Problem: Psychotic Signs/Symptoms  Goal: Improved Behavioral Control (Psychotic Signs/Symptoms)  Outcome: Progressing   Goal Outcome Evaluation:    Plan of Care Reviewed With: patient                   "

## 2024-11-11 NOTE — PROVIDER NOTIFICATION
11/11/24 0911   Individualization/Patient Specific Goals   Patient Personal Strengths resourceful;resilient;positive vocational history;ability to maintain sobriety   Patient Vulnerabilities housing insecurity;lacks insight into illness;poor impulse control   Interprofessional Rounds   Summary Discussed patient progress and ongoing mental health symptoms.   Participants nursing;CTC;psychiatrist;other (see comments);pharmacy   Behavioral Team Discussion   Participants Dr. Sarah MD; Dr. Bruce MD; Jose A GRACE; Kylee Becerra [facility]; medical students; pharmacy resident   Progress Pt has improved slightly   Anticipated length of stay 60+ days   Continued Stay Criteria/Rationale Medication management; symptom stabilization; care coordination   Medical/Physical See H&P   Precautions See below   Plan Psychiatric assessment/Medication management. Therapeutic Milieu. Individual care planning and after care planning. Patient to participate in unit groups and activities. Individual and group support on unit.   Safety Plan Completed by unit therapist prior to discharge   Anticipated Discharge Disposition another healthcare facility     PRECAUTIONS AND SAFETY    Behavioral Orders   Procedures    Assault precautions    Code 1 - Restrict to Unit    Routine Programming     As clinically indicated    Status 15     Every 15 minutes.    Status Individual Observation     1:1 during evening shift, & night shift.    If patient refuses overnight presence of staff in his room, it's ok to do 15 min checks through the window blinds.    Patient SIO status reviewed with team/RN.  Please also refer to RN/team documentation for add'l detail.    -SIO staff to monitor following which have contributed to patient being on SIO:  Pt has psychosis regarding being followed and attacked, very paranoid, HI    -Possible interventions SIO staff could use to support patient's treatment progress:  Redirect and reassure  -When following observed, team will  review discontinuation of SIO:  Psychosis improves     Order Specific Question:   CONTINUOUS 24 hours / day     Answer:   Other     Order Specific Question:   Specify distance     Answer:   10 feet, 5 feet when close to the doors     Order Specific Question:   Indications for SIO     Answer:   Assault risk     Order Specific Question:   Indications for SIO     Answer:   Severe intrusiveness       Safety  Safety WDL: WDL  Patient Location[facility]  Observed Behavior: calm, sitting, walking  Observed Behavior (Comment): pacing hallway  Airway Safety Measures: other (see comments)  Safety Measures: environmental rounds completed  Diversional Activity: television  De-Escalation Techniques: verbally redirected  Suicidality: Status 15, SIO (Status Individual Observation)  (NOTE - order will specify distance  Assault: status 15, private room  Elopement Assessment: Loitering near exit doors, Statements about wanting to leave  Elopement Interventions: status continuous sight, no shoes, room away from unit doors

## 2024-11-11 NOTE — PROGRESS NOTES
"  ----------------------------------------------------------------------------------------------------------  [facility]  Psychiatry Progress Note  Hospital Day #30     Interim History:     The patient's care was discussed with the treatment team and chart notes were reviewed.    Vitals: VSS  Sleep: 5 h  Scheduled medications: Took all scheduled medications as prescribed  Psychiatric PRN medications: None     Staff Report:   - Seen by neurology   - Patient did try to walk out the main doors once, but was easily redirectable  - Denying all mental health concerns     Please see staff notes for details.      Subjective:     Patient Interview:  Reports his weekend was good. Reports he slept good. Talked to the team about the [employer]. The team discussed changing one of his medications to the evening time. Asked the team \"Can you count me out?\"     ROS:  Negative except for above.      Objective:     Vitals:  /74   Pulse 60   Temp 97.6  F (36.4  C) (Oral)   Resp 16   Wt 105.5 kg (232 lb 8 oz)   SpO2 95%   BMI 37.53 kg/m      Allergies:  No Known Allergies    Current Medications:  Scheduled:  Current Facility-Administered Medications   Medication Dose Route Frequency Provider Last Rate Last Admin    acetaminophen (TYLENOL) tablet 650 mg  650 mg Oral Q4H PRN Nikki Caceres MD   650 mg at 11/05/24 0905    alum & mag hydroxide-simethicone (MAALOX) suspension 30 mL  30 mL Oral Q4H PRN Nikki Caceres MD   30 mL at 10/17/24 0837    amLODIPine (NORVASC) tablet 10 mg  10 mg Oral Daily Presley Barrera MD   10 mg at 11/10/24 0752    atorvastatin (LIPITOR) tablet 40 mg  40 mg Oral Daily Nikki Caceres MD   40 mg at 11/10/24 0751    cholecalciferol (VITAMIN D3) capsule 1,250 mcg  1,250 mcg Oral Q7 Days Sirisha, Evelia, DO   1,250 mcg at 11/05/24 1234    cinacalcet (SENSIPAR) tablet 30 mg  30 mg Oral Daily Presley Barrera MD   30 mg at 11/10/24 0752    cloNIDine " (CATAPRES) tablet 0.1 mg  0.1 mg Oral BID Presley Barrera MD   0.1 mg at 11/10/24 2008    furosemide (LASIX) tablet 40 mg  40 mg Oral Daily Presley Barrera MD   40 mg at 11/10/24 0752    gabapentin (NEURONTIN) capsule 100 mg  100 mg Oral Q6H PRN Nikki Caceres MD        lisinopril (ZESTRIL) tablet 40 mg  40 mg Oral Daily Presley Barrera MD   40 mg at 11/10/24 0752    melatonin tablet 3 mg  3 mg Oral At Bedtime Presley Barrera MD   3 mg at 11/10/24 2008    metoprolol succinate ER (TOPROL XL) 24 hr tablet 50 mg  50 mg Oral Daily Presley Barrera MD   50 mg at 11/10/24 0752    nicotine (NICODERM CQ) 14 MG/24HR 24 hr patch 1 patch  1 patch Transdermal Daily Evelia Grimm DO   1 patch at 11/10/24 0752    nicotine (NICODERM CQ) 21 MG/24HR 24 hr patch 1 patch  1 patch Transdermal Daily PRN Britt Kang MD   1 patch at 10/13/24 1611    nicotine (NICORETTE) gum 2 mg  2 mg Buccal Q1H PRN González Garcia MD   2 mg at 10/24/24 1254    OLANZapine (zyPREXA) tablet 5 mg  5 mg Oral TID PRN Evelia Grimm DO   5 mg at 10/24/24 1101    Or    OLANZapine (zyPREXA) injection 5 mg  5 mg Intramuscular TID PRN Evelia Grimm DO        OLANZapine zydis (zyPREXA) ODT half-tab 2.5 mg  2.5 mg Oral QAM Presley Barrera MD   2.5 mg at 11/10/24 0751    OLANZapine zydis (zyPREXA) ODT tab 5 mg  5 mg Oral At Bedtime González Garcia MD   5 mg at 11/10/24 2008    polyethylene glycol (MIRALAX) Packet 17 g  17 g Oral Daily PRN Nikki Caceres MD        traZODone (DESYREL) half-tab 25 mg  25 mg Oral At Bedtime PRN Evelia Grimm DO   25 mg at 11/04/24 0141    Or    traZODone (DESYREL) tablet 50 mg  50 mg Oral At Bedtime PRN Evelia Grimm DO         PRN:  Current Facility-Administered Medications   Medication Dose Route Frequency Provider Last Rate Last Admin    acetaminophen (TYLENOL) tablet 650 mg  650 mg Oral Q4H PRN Nikki Caceres MD   650 mg at 11/05/24 0905    alum & mag hydroxide-simethicone (MAALOX) suspension 30 mL  30 mL Oral Q4H PRN  Nikki Caceres MD   30 mL at 10/17/24 0837    amLODIPine (NORVASC) tablet 10 mg  10 mg Oral Daily Presley Barrera MD   10 mg at 11/10/24 0752    atorvastatin (LIPITOR) tablet 40 mg  40 mg Oral Daily Nikki Caceres MD   40 mg at 11/10/24 0751    cholecalciferol (VITAMIN D3) capsule 1,250 mcg  1,250 mcg Oral Q7 Days Evelia Grimm DO   1,250 mcg at 11/05/24 1234    cinacalcet (SENSIPAR) tablet 30 mg  30 mg Oral Daily Presley Barrera MD   30 mg at 11/10/24 0752    cloNIDine (CATAPRES) tablet 0.1 mg  0.1 mg Oral BID Presley Barrera MD   0.1 mg at 11/10/24 2008    furosemide (LASIX) tablet 40 mg  40 mg Oral Daily Presley Barrera MD   40 mg at 11/10/24 0752    gabapentin (NEURONTIN) capsule 100 mg  100 mg Oral Q6H PRN Nikki Caceres MD        lisinopril (ZESTRIL) tablet 40 mg  40 mg Oral Daily Presley Barrera MD   40 mg at 11/10/24 0752    melatonin tablet 3 mg  3 mg Oral At Bedtime Presley Barrera MD   3 mg at 11/10/24 2008    metoprolol succinate ER (TOPROL XL) 24 hr tablet 50 mg  50 mg Oral Daily Presley Barrera MD   50 mg at 11/10/24 0752    nicotine (NICODERM CQ) 14 MG/24HR 24 hr patch 1 patch  1 patch Transdermal Daily Evelia Grimm DO   1 patch at 11/10/24 0752    nicotine (NICODERM CQ) 21 MG/24HR 24 hr patch 1 patch  1 patch Transdermal Daily PRN Britt Kang MD   1 patch at 10/13/24 1611    nicotine (NICORETTE) gum 2 mg  2 mg Buccal Q1H PRN González Garcia MD   2 mg at 10/24/24 1254    OLANZapine (zyPREXA) tablet 5 mg  5 mg Oral TID PRN Evelia Grimm DO   5 mg at 10/24/24 1101    Or    OLANZapine (zyPREXA) injection 5 mg  5 mg Intramuscular TID PRN Evelia Grimm DO        OLANZapine zydis (zyPREXA) ODT half-tab 2.5 mg  2.5 mg Oral QAM Presley Barrera MD   2.5 mg at 11/10/24 0751    OLANZapine zydis (zyPREXA) ODT tab 5 mg  5 mg Oral At Bedtime González Garcia MD   5 mg at 11/10/24 2008    polyethylene glycol (MIRALAX) Packet 17 g  17 g Oral Daily PRN Nikki Caceres MD        traZODone  "(DESYREL) half-tab 25 mg  25 mg Oral At Bedtime PRN Evelia Grimm DO   25 mg at 11/04/24 0141    Or    traZODone (DESYREL) tablet 50 mg  50 mg Oral At Bedtime PRN Evelia Grimm DO         Labs and Imaging:    Data this admission:  - CBC unremarkable  - CMP unremarkable  - TSH normal  - UDS negative  - Vit D low  - Hgb A1c 5.9 (10/13/24)  - Lipids unremarkable  - Vit B12 normal  - Folate normal  - Urinalysis unremarkable  - EKG normal sinus rhythm, QTc 390  - Head CT showed no acute changes  - HIV non reactive  - Treponema antibody non reactive  - ESR wnl   - Ceruloplasmin wnl   - BOOGIE negative  - Lyme negative      Parathyroid hormone:   10/13: 85     Calcium:  10/14: 11.4  10/16: 10.3  10/17: 10.3  10/19: 9.8  10/21: 10.6  10/23: 10.3     Albumin:  10/14: 4.3  10/16: 4.3  10/17: 4.1  10/19: 4.2  10/21: 4.5  10/23: 4.3      MRI:   IMPRESSION:  1. No acute intracranial pathology.  2. No focal lesion or structural abnormality.   3. Symmetric frontoparietal cortical volume loss slightly more than  expected for age.     Mental Status Exam:     Oriented to:  Oriented to self and day, not oriented to location or situation  General:  Awake and Confused  Appearance:  appears stated age and Dressed in his own clothes   Behavior/Attitude:  Calm, Cooperative, and Indifferent  Eye Contact: Appropriate  Psychomotor: No evidence of tics, dystonia, or tardive dyskinesia  no catatonia present  Speech:  appropriate volume/tone  Language: Fluent in English with appropriate syntax and vocabulary.  Mood:  \"good\"  Affect:  congruent with mood  Thought Process:  looseness of association, disorganized, and confused  Thought Content:   Did not assess SI/HI/AH/VH today; no apparent delusions  Associations:  loose  Insight:  impaired   Judgment:  partial   Impulse control: limited  Attention Span:  decreased  Concentration:  distractible  Recent and Remote Memory:  recent memory impaired, remote memory intact  Fund of Knowledge: average  Muscle " Strength and Tone: normal  Gait and Station: Normal     Psychiatric Assessment     Estevan Aaron is a 64 year old male with previous psychiatric diagnoses of GEORGINA admitted from the ER on 10/12/2024 due to concern for HI and psychosis in the context of medical issues (hyperthyroidism, hypercalcemia), psychosocial stressors including with recent divorce and selling his home. This is the patient's first psychiatric hospitalization. Significant symptoms on admission included delusions of persecution with grandiose beliefs, homicidal ideations, as well as disorganized thinking and behavior. The MSE on admission was pertinent for a thought process which was perseverative, circumstantial, tangential, disorganized and tangential; with rambling and looseness of associations. Psychological contributions to presentation included lack of insight. Social factors contributing to presentation included isolation, recent divorce, selling his house, and moving from hotel to hotel. Biological contributions to presentation included a history of hyperparathyroidism with chronic hypercalcemia per charts as well as a history of methamphetamine use per collateral from ex-wife.     History was difficult to obtain due to the patient's severe disorganized thinking on interview; he was observed with persecutory delusions pertaining to the realtor and gang members, disorganized behavior as these delusions have led him to flee to different hotels to stay safe/ has called  complaining of being targeted and auditory hallucinations of voices for the past 12 months. Per collateral from ex-wife, Santos has had paranoid ideations since they first met. He has always felt like people are out to get him or trying to rip him off. She says that he has had visual hallucinations (he will point things out that the rest of the family can't see) for a long time, but the family would just go along with him to avoid making him angry. This timeline and  presentation could be consistent with diagnosis of paranoid personality disorder. Things began to worsen around the beginning of the COVID pandemic, when Santos became more isolated and the family started to notice cognitive issues such as impaired memory. Immediately prior to this hospitalization, the ex-wife found Santos in a hotel room with a generator full of gasoline, binoculars, and hunting equipment. This time course does not suggest acute psychosis, however given the patient has never had a full psychiatric work-up, we completed a first-episode psychosis work-up.      Other things that could be contributing to his presentation is hyperparathyroidism with hypercalcemia. However, patient's calcium returned to normal limits on 10/16, and patient continues to have psychosis so likely not a significant contributing factor to patient's presentation.      Patient also continues to be disoriented and his behaviors appear to worsen in the evenings. This raised concern for underlying neurocognitive disorder with delirium. Patient received MRI brain with and without contrast which showed frontal and parietal atrophy greater than would be expected for patient's age. This is consistent with neurocognitive disorder. Patient has continued to improve with decreasing his antipsychotic. Working to get patient transferred to geriatric psychiatry in the short term and to memory care long term.      Given that he currently has psychosis, patient warrants inpatient psychiatric hospitalization to maintain his safety.     Psychiatric Plan by Diagnosis      Today's changes:  - Moved daytime dose of olanzapine to afternoon (2 pm)     # Major Neurocognitive Disorder  # Rule out paranoid personality disorder  1. Medications:  - Discontinued PTA fluoxetine 40 mg, consider restarting at a later time   - Olanzapine 2.5 mg during afternoon and 5 mg at bedtime  - Neurology consult 11/8/24, recommend outpatient follow-up and neuropsychiatric  testing     2. Pertinent Labs/Monitoring:   - See above     3. Additional Plans:  - Patient will be treated in therapeutic milieu with appropriate individual and group therapies as described     # Unspecified anxiety vs Generalized Anxiety Disorder  - Monitor for symptoms.  - Fluoxetine held due to suspicion of ongoing manic symptoms     Psychiatric Hospital Course:      Estevan Aaron was admitted to [facility] on a 72 hour hold.   Medications:  PTA fluoxetine was held due to concern for worsening of zelalem   New medications started at the time of admission include Zyprexa.   Increased olanzapine 10 mg at bedtime was to 10 mg BID (10/14)   Increased olanzapine 10 mg BID to 10 mg during day and 15 mg at bedtime (10/15)  10/21: increased olanzapine pm dose from 15 to 20 mg  10/23: started melatonin 3 mg at 7 pm to help patient with circadian rhythm as he has been staying up throughout the night and sleeping a lot during the day and there is some concern for delirium   10/24: consulted anesthesia for MRI brain   10/28: MRI brain complete, decrease AM olanzapine from 10 to 5 mg  10/29: Morning olanzapine decreased to 2.5 mg, evening olanzapine decreased to 15 mg   11/1: Decreased evening olanzapine to 10 mg   11/4: Decreased evening olanzapine to 7.5 mg   11/5: Decreased evening olanzapine to 5 mg   11/11: Moved morning dose of olanzapine to the afternoon as patient appears more agitated in the afternoons/evenings     Care conference 10/18/24 with daughter Maria C and ex-wife Clifford  - Report that patient has always been paranoid since ex-wife first met him. She describes him always feeling like he is getting ripped off.   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 They reported once incidence in which he made a bid on a job and the client paid him, but he never ended up finishing the job  - Wife and him  officially this year, and since selling the house several months ago, patient has been moving from hotel to hotel due to thinking people are out to get him   - When they were selling their house, patient threatened realtors and felt he was being ripped off   - Clifford reports that she started to be afraid to be around him alone  - When he was found in the hotel, he had a generator full of gasoline, binoculars, bullets, and hunting equipment.     10/21/24: updated daughter on Santos's treatment plan      10/23/24: updated daughter on Santos's treatment plan      10/25/24: updated daughter on Santos's treatment plan, including plan to try and get MRI brain done under sedation. She said that Santos's grandfather had dementia and his sister has a brain tumor.      10/28/24: updated daughter on Santos's MRI results. She is planning on coming into town soon.      Care conference 11/1/24 with daughters Maria C and Estefani and ex-wife Clifford  - Discussed dementia diagnosis   - Clifford asked about plans moving forward. Explained that applying for medical assistance is the first step. Explained that application processing time can be very variable. Informed family that Santos will most likely be staying on this inpatient unit during this time.   - Clifford expressed that their family would like to be the power of /have conservatorship for Santos.   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 Reports that he has bills that he has not paid.   - Maria C is planning to move to [place], and Clifford currently lives in [place]. Family prefer that Santos would in a facility  that are near these locations. Discussed with family that they can also try to request a specific location (memory care).   - Clifford reports that he had previously gotten help applying for his social security and medicare, but unsure if it had been approved.   - Informed family that there is a geriatric unit and there might be a transfer if there becomes availability on this unit.   - Family shared that he was completely different just two months ago.   - Estefani shared that his family found his medications in his old truck recently, expressed that it was likely that he was not taking his medications prior to his hospitalization.     11/6/24: updated Maria C on plan to try and transfer Santos to Geriatric unit.     11/11/24: updated Maria C on neurology consult      The risks, benefits, alternatives, and side effects were discussed and understood by the patient and other caregivers.     Medical Assessment and Plan     Medical diagnoses to be addressed this admission:    # Hypertension  - Continue PTA medications  Furosemide 40 mg daily  Lisinopril 40 mg daily  Metoprolol 50 mg daily  Amlodipine 10 mg daily  Clonidine 0.1 mg BID     # Hyperlipidemia  - Continue PTA Atorvastatin 40 mg     # Hyperparathyroidism  # Hypercalcemia, hypophosphoremia   Increased Ca level to 10.9 and decreased Ph level to 2.3 in the ED. Suspicion patient's hypercalcemia could be contributing to symptoms of psychosis.  - Consulted endocrinology, who started cinacalcet 30 mg BID on 10/13  - 10/16 endocrinology recommended continuing to trend calcium and albumin to make sure patient does not become hypocalcemic and recommended holding cinacalcet   - 10/17, calcium and albumin wnl, endo recommended cinacalcet 30 mg once daily   - 10/21, per endo continue cincaclcet and recheck calcium and albumin on October 24  - 10/23: per endo, patient needs to follow up outpatient for further management of hyperparathyroidism      Medical course: Patient was  physically examined by the ED prior to being transferred to the unit and was found to be medically stable and appropriate for admission.      Consults: Psychiatry, Endocrinology (follow-up of hyperthyroidism / hypercalcemia and hypertension), neurology     Checklist     Legal Status: Committed with [facility]     Safety Assessment:   Behavioral Orders   Procedures    Assault precautions    Code 1 - Restrict to Unit    Routine Programming     As clinically indicated    Status 15     Every 15 minutes.    Status Individual Observation     1:1 during evening shift, & night shift.    If patient refuses overnight presence of staff in his room, it's ok to do 15 min checks through the window blinds.    Patient SIO status reviewed with team/RN.  Please also refer to RN/team documentation for add'l detail.    -SIO staff to monitor following which have contributed to patient being on SIO:  Pt has psychosis regarding being followed and attacked, very paranoid, HI    -Possible interventions SIO staff could use to support patient's treatment progress:  Redirect and reassure  -When following observed, team will review discontinuation of SIO:  Psychosis improves     Order Specific Question:   CONTINUOUS 24 hours / day     Answer:   Other     Order Specific Question:   Specify distance     Answer:   10 feet, 5 feet when close to the doors     Order Specific Question:   Indications for SIO     Answer:   Assault risk     Order Specific Question:   Indications for SIO     Answer:   Severe intrusiveness     Risk Assessment:  Risk for harm is elevated.  Risk factors: impulsive and past behaviors  Protective factors: family     SIO: 1:1 during evening and night shift     Disposition: Pending stabilization, medication optimization, & development of a safe discharge plan.      Attestations     This patient was seen and discussed with my attending physician.  Presley Barrera MD  PGY-1 Psychiatry Resident     seen and evaluated by me, Pete Smith MD.  I have discussed this patient with the house staff team including the resident and/or medical student and I agree with the findings and plan in this note.    I have reviewed today's vital signs, medications, labs and imaging. Pete Smith MD , PhD.

## 2024-11-11 NOTE — PLAN OF CARE
BEH IP Unit Acuity Rating Score (UARS)  Patient is given one point for every criteria they meet.    CRITERIA SCORING   On a 72 hour hold, court hold, committed, stay of commitment, or revocation. 1    Patient LOS on BEH unit exceeds 20 days. 1  LOS: 30   Patient under guardianship, 55+, otherwise medically complex, or under age 11. 1   Suicide ideation without relief of precipitating factors. 0   Current plan for suicide. 0   Current plan for homicide. 0   Imminent risk or actual attempt to seriously harm another without relief of factors precipitating the attempt. 1   Severe dysfunction in daily living (ex: complete neglect for self care, extreme disruption in vegetative function, extreme deterioration in social interactions). 1   Recent (last 7 days) or current physical aggression in the ED or on unit. 0   Restraints or seclusion episode in past 72 hours. 0   Recent (last 7 days) or current verbal aggression, agitation, yelling, etc., while in the ED or unit. 0   Active psychosis. 1   Need for constant or near constant redirection (from leaving, from others, etc).  0   Intrusive or disruptive behaviors. 0   Patient requires 3 or more hours of individualized nursing care per 8-hour shift (i.e. for ADLs, meds, therapeutic interventions). 1   TOTAL 7

## 2024-11-11 NOTE — TELEPHONE ENCOUNTER
R:  [place]  Access Inpatient Bed Call Log 11/11/2024  @4:20 PM:  Intake has called facilities that have not updated the bed status within the last 12 hours.         (Adults);                   [facility] is posting 0 beds.                 Pt remains on the work list pending appropriate bed availability.

## 2024-11-12 ENCOUNTER — APPOINTMENT (OUTPATIENT)
Dept: GENERAL RADIOLOGY | Facility: CLINIC | Age: 64
End: 2024-11-12
Payer: COMMERCIAL

## 2024-11-12 ENCOUNTER — TELEPHONE (OUTPATIENT)
Dept: BEHAVIORAL HEALTH | Facility: CLINIC | Age: 64
End: 2024-11-12
Payer: COMMERCIAL

## 2024-11-12 PROCEDURE — 124N000002 HC R&B MH UMMC

## 2024-11-12 PROCEDURE — 99231 SBSQ HOSP IP/OBS SF/LOW 25: CPT | Mod: GC | Performed by: PSYCHIATRY & NEUROLOGY

## 2024-11-12 PROCEDURE — 71045 X-RAY EXAM CHEST 1 VIEW: CPT

## 2024-11-12 PROCEDURE — 250N000013 HC RX MED GY IP 250 OP 250 PS 637

## 2024-11-12 PROCEDURE — 71045 X-RAY EXAM CHEST 1 VIEW: CPT | Mod: 26 | Performed by: RADIOLOGY

## 2024-11-12 RX ADMIN — OLANZAPINE 5 MG: 5 TABLET, FILM COATED ORAL at 02:49

## 2024-11-12 RX ADMIN — Medication 3 MG: at 21:22

## 2024-11-12 RX ADMIN — OLANZAPINE 5 MG: 5 TABLET, ORALLY DISINTEGRATING ORAL at 21:22

## 2024-11-12 RX ADMIN — METOPROLOL SUCCINATE 50 MG: 50 TABLET, EXTENDED RELEASE ORAL at 09:32

## 2024-11-12 RX ADMIN — CLONIDINE HYDROCHLORIDE 0.1 MG: 0.1 TABLET ORAL at 09:32

## 2024-11-12 RX ADMIN — OLANZAPINE 2.5 MG: 5 TABLET, ORALLY DISINTEGRATING ORAL at 14:14

## 2024-11-12 RX ADMIN — CINACALCET 30 MG: 30 TABLET ORAL at 09:31

## 2024-11-12 RX ADMIN — Medication 1250 MCG: at 11:53

## 2024-11-12 RX ADMIN — CLONIDINE HYDROCHLORIDE 0.1 MG: 0.1 TABLET ORAL at 21:22

## 2024-11-12 RX ADMIN — Medication 25 MG: at 02:49

## 2024-11-12 RX ADMIN — FUROSEMIDE 40 MG: 40 TABLET ORAL at 09:32

## 2024-11-12 RX ADMIN — ATORVASTATIN CALCIUM 40 MG: 20 TABLET, FILM COATED ORAL at 09:31

## 2024-11-12 RX ADMIN — Medication 1 PATCH: at 09:32

## 2024-11-12 RX ADMIN — LISINOPRIL 40 MG: 10 TABLET ORAL at 09:31

## 2024-11-12 RX ADMIN — AMLODIPINE BESYLATE 10 MG: 5 TABLET ORAL at 09:32

## 2024-11-12 ASSESSMENT — ACTIVITIES OF DAILY LIVING (ADL)
ADLS_ACUITY_SCORE: 0
ORAL_HYGIENE: INDEPENDENT
ADLS_ACUITY_SCORE: 0
HYGIENE/GROOMING: INDEPENDENT
ADLS_ACUITY_SCORE: 0
DRESS: INDEPENDENT
ADLS_ACUITY_SCORE: 0

## 2024-11-12 NOTE — PLAN OF CARE
Problem: Sleep Disturbance  Goal: Adequate Sleep/Rest  Outcome: Progressing     Patient denies pain and discomfort. Patient agitated, very intrusive tried to open multiple doors, wanted to go home, likes to watch TV before 0700.  Patient sleeps in the lounge area most of the shift. Encouraged to go in his room but declined. Patient was difficult to be redirected- [facility] team was called. Patient agreed to take Trazodone and Zyprexa for sleep and agitation. Patient exposing self in the lounge, pulling his pants down-multiple encouragement to go to his room by the staff.  Patient continues to be on SIO with severe intrusiveness and assault risk . Patient appeared to be asleep for 5 hours during this shift. Will continue to monitor  the patient and update MD if there are any concerns. Will continue to monitor the patient and provide therapeutic intervention as needed. Will continue with current plan of care.

## 2024-11-12 NOTE — PLAN OF CARE
Problem: Psychotic Signs/Symptoms  Goal: Improved Behavioral Control (Psychotic Signs/Symptoms)  Outcome: Progressing  Intervention: Manage Behavior  Recent Flowsheet Documentation  Taken 11/11/2024 1900 by Pool Urbina RN  De-Escalation Techniques: verbally redirected     Problem: Anxiety  Goal: Anxiety Reduction or Resolution  Outcome: Progressing  Intervention: Promote Anxiety Reduction  Recent Flowsheet Documentation  Taken 11/11/2024 1900 by Pool Urbina RN  Supportive Measures:   verbalization of feelings encouraged   positive reinforcement provided   active listening utilized  Family/Support System Care:   involvement promoted   self-care encouraged   support provided   Goal Outcome Evaluation:    Plan of Care Reviewed With: patient      Patient was disorganized and confused as evidence by him walking into peers' rooms and unable to identify where his room was . His thoughts were scattered,as evidence from his senseless statements. he was difficult to redirect. On multiple occasion, he was standing by the main entrance with folders in his hands trying to exit and was irritable on redirect. He refused vitals and medications, but eventually too his meds after multiple attempts.

## 2024-11-12 NOTE — PROGRESS NOTES
"  ----------------------------------------------------------------------------------------------------------  [facility]  Psychiatry Progress Note  Hospital Day #31     Interim History:     The patient's care was discussed with the treatment team and chart notes were reviewed.    Vitals: VSS  Sleep: 5 h  Scheduled medications: Took all scheduled medications as prescribed  Psychiatric PRN medications: None     Staff Report:   - During evening shift, was walking into other patient's rooms  - Stood by exit with folders in his hands, trying to leave    Please see staff notes for details.      Subjective:     Patient Interview:  Reports he is good today. Reports he \"slept like a log.\" Shares with the team that he was going to look up his daughter's address and check on his car. The team discussed that one of his medications was moved to the afternoon yesterday. Patient says \"20 of them?\"     Patient does not remember waking up at 3am this morning. Denies sleep walking in the past.     ROS:  Negative except for above.      Objective:     Vitals:  /79   Pulse 52   Temp 97.7  F (36.5  C) (Oral)   Resp 16   Wt 105.5 kg (232 lb 8 oz)   SpO2 95%   BMI 37.53 kg/m      Allergies:  No Known Allergies    Current Medications:  Scheduled:  Current Facility-Administered Medications   Medication Dose Route Frequency Provider Last Rate Last Admin    acetaminophen (TYLENOL) tablet 650 mg  650 mg Oral Q4H PRN Nikki Caceres MD   650 mg at 11/05/24 0905    alum & mag hydroxide-simethicone (MAALOX) suspension 30 mL  30 mL Oral Q4H PRN Nikki Caceres MD   30 mL at 10/17/24 0837    amLODIPine (NORVASC) tablet 10 mg  10 mg Oral Daily Presley Barrera MD   10 mg at 11/11/24 0947    atorvastatin (LIPITOR) tablet 40 mg  40 mg Oral Daily Nikki Caceres MD   40 mg at 11/11/24 0946    cholecalciferol (VITAMIN D3) capsule 1,250 mcg  1,250 mcg Oral Q7 Days Evelia Grimm DO   1,250 " mcg at 11/05/24 1234    cinacalcet (SENSIPAR) tablet 30 mg  30 mg Oral Daily Presley Barrera MD   30 mg at 11/11/24 0946    cloNIDine (CATAPRES) tablet 0.1 mg  0.1 mg Oral BID Presley Barrera MD   0.1 mg at 11/11/24 1954    furosemide (LASIX) tablet 40 mg  40 mg Oral Daily Presley Barrera MD   40 mg at 11/11/24 0946    gabapentin (NEURONTIN) capsule 100 mg  100 mg Oral Q6H PRN Nikki Caceres MD        lisinopril (ZESTRIL) tablet 40 mg  40 mg Oral Daily Presley Barrera MD   40 mg at 11/11/24 0946    melatonin tablet 3 mg  3 mg Oral At Bedtime Presley Barrera MD   3 mg at 11/11/24 1954    metoprolol succinate ER (TOPROL XL) 24 hr tablet 50 mg  50 mg Oral Daily Presley Barrera MD   50 mg at 11/11/24 0947    nicotine (NICODERM CQ) 14 MG/24HR 24 hr patch 1 patch  1 patch Transdermal Daily Evelia Grimm DO   1 patch at 11/11/24 0947    nicotine (NICODERM CQ) 21 MG/24HR 24 hr patch 1 patch  1 patch Transdermal Daily PRN Britt Kang MD   1 patch at 10/13/24 1611    nicotine (NICORETTE) gum 2 mg  2 mg Buccal Q1H PRN González Garcia MD   2 mg at 10/24/24 1254    OLANZapine (zyPREXA) tablet 5 mg  5 mg Oral TID PRN Evelia Grimm DO   5 mg at 11/12/24 0249    Or    OLANZapine (zyPREXA) injection 5 mg  5 mg Intramuscular TID PRN Evelia Grimm DO        OLANZapine zydis (zyPREXA) ODT half-tab 2.5 mg  2.5 mg Oral QAM Presley Barrera MD   2.5 mg at 11/11/24 1424    OLANZapine zydis (zyPREXA) ODT tab 5 mg  5 mg Oral At Bedtime González Garcia MD   5 mg at 11/11/24 1954    polyethylene glycol (MIRALAX) Packet 17 g  17 g Oral Daily PRN Nikki Caceres MD        traZODone (DESYREL) half-tab 25 mg  25 mg Oral At Bedtime PRN Evelia Grimm DO   25 mg at 11/12/24 0249    Or    traZODone (DESYREL) tablet 50 mg  50 mg Oral At Bedtime PRN Evelia Grimm DO         PRN:  Current Facility-Administered Medications   Medication Dose Route Frequency Provider Last Rate Last Admin    acetaminophen (TYLENOL) tablet 650 mg  650 mg Oral Q4H  PRN Nikki Caceres MD   650 mg at 11/05/24 0905    alum & mag hydroxide-simethicone (MAALOX) suspension 30 mL  30 mL Oral Q4H PRN Nikki Caceres MD   30 mL at 10/17/24 0837    amLODIPine (NORVASC) tablet 10 mg  10 mg Oral Daily Presley Barrera MD   10 mg at 11/11/24 0947    atorvastatin (LIPITOR) tablet 40 mg  40 mg Oral Daily Nikki Caceres MD   40 mg at 11/11/24 0946    cholecalciferol (VITAMIN D3) capsule 1,250 mcg  1,250 mcg Oral Q7 Days Evelia Grimm DO   1,250 mcg at 11/05/24 1234    cinacalcet (SENSIPAR) tablet 30 mg  30 mg Oral Daily Presley Barrera MD   30 mg at 11/11/24 0946    cloNIDine (CATAPRES) tablet 0.1 mg  0.1 mg Oral BID Presley Barrera MD   0.1 mg at 11/11/24 1954    furosemide (LASIX) tablet 40 mg  40 mg Oral Daily Presley Barrrea MD   40 mg at 11/11/24 0946    gabapentin (NEURONTIN) capsule 100 mg  100 mg Oral Q6H PRN Nikki Caceres MD        lisinopril (ZESTRIL) tablet 40 mg  40 mg Oral Daily Presley Barrera MD   40 mg at 11/11/24 0946    melatonin tablet 3 mg  3 mg Oral At Bedtime Presley Barrera MD   3 mg at 11/11/24 1954    metoprolol succinate ER (TOPROL XL) 24 hr tablet 50 mg  50 mg Oral Daily Presley Barrera MD   50 mg at 11/11/24 0947    nicotine (NICODERM CQ) 14 MG/24HR 24 hr patch 1 patch  1 patch Transdermal Daily Evelia Grimm DO   1 patch at 11/11/24 0947    nicotine (NICODERM CQ) 21 MG/24HR 24 hr patch 1 patch  1 patch Transdermal Daily PRN Britt Kang MD   1 patch at 10/13/24 1611    nicotine (NICORETTE) gum 2 mg  2 mg Buccal Q1H PRN González Garcia MD   2 mg at 10/24/24 1254    OLANZapine (zyPREXA) tablet 5 mg  5 mg Oral TID PRN Evelia Grimm DO   5 mg at 11/12/24 0249    Or    OLANZapine (zyPREXA) injection 5 mg  5 mg Intramuscular TID PRN Evelia Grimm DO        OLANZapine zydis (zyPREXA) ODT half-tab 2.5 mg  2.5 mg Oral Presley Mendiola MD   2.5 mg at 11/11/24 1424    OLANZapine zydis (zyPREXA) ODT tab 5 mg  5 mg Oral At Bedtime González Garcia MD    "5 mg at 11/11/24 1954    polyethylene glycol (MIRALAX) Packet 17 g  17 g Oral Daily PRN Nikki Caceres MD        traZODone (DESYREL) half-tab 25 mg  25 mg Oral At Bedtime PRN Evelia Grimm DO   25 mg at 11/12/24 0249    Or    traZODone (DESYREL) tablet 50 mg  50 mg Oral At Bedtime PRN Evelia Grimm DO         Labs and Imaging:    Data this admission:  - CBC unremarkable  - CMP unremarkable  - TSH normal  - UDS negative  - Vit D low  - Hgb A1c 5.9 (10/13/24)  - Lipids unremarkable  - Vit B12 normal  - Folate normal  - Urinalysis unremarkable  - EKG normal sinus rhythm, QTc 390  - Head CT showed no acute changes  - HIV non reactive  - Treponema antibody non reactive  - ESR wnl   - Ceruloplasmin wnl   - BOOGIE negative  - Lyme negative      Parathyroid hormone:   10/13: 85     Calcium:  10/14: 11.4  10/16: 10.3  10/17: 10.3  10/19: 9.8  10/21: 10.6  10/23: 10.3     Albumin:  10/14: 4.3  10/16: 4.3  10/17: 4.1  10/19: 4.2  10/21: 4.5  10/23: 4.3     CXR:   -Pending      MRI:   IMPRESSION:  1. No acute intracranial pathology.  2. No focal lesion or structural abnormality.   3. Symmetric frontoparietal cortical volume loss slightly more than  expected for age.     Mental Status Exam:     Oriented to:  Not oriented to location or situation  General:  Awake and Confused  Appearance:  appears stated age and Dressed in his own clothes   Behavior/Attitude:  Calm, Cooperative, and Indifferent  Eye Contact: Appropriate  Psychomotor: No evidence of tics, dystonia, or tardive dyskinesia  no catatonia present  Speech:  appropriate volume/tone  Language: Fluent in English with appropriate syntax and vocabulary.  Mood:  \"good\"  Affect:  congruent with mood  Thought Process:  looseness of association, disorganized, and confused  Thought Content:   Did not assess SI/HI/AH/VH today; no apparent delusions  Associations:  loose  Insight:  impaired   Judgment:  partial   Impulse control: limited  Attention Span:  decreased  Concentration:  " distractible  Recent and Remote Memory:  recent memory impaired, remote memory intact  Fund of Knowledge: average  Muscle Strength and Tone: normal  Gait and Station: Normal     Psychiatric Assessment     Estevan Aaron is a 64 year old male with previous psychiatric diagnoses of GEORGINA admitted from the ER on 10/12/2024 due to concern for HI and psychosis in the context of medical issues (hyperthyroidism, hypercalcemia), psychosocial stressors including with recent divorce and selling his home. This is the patient's first psychiatric hospitalization. Significant symptoms on admission included delusions of persecution with grandiose beliefs, homicidal ideations, as well as disorganized thinking and behavior. The MSE on admission was pertinent for a thought process which was perseverative, circumstantial, tangential, disorganized and tangential; with rambling and looseness of associations. Psychological contributions to presentation included lack of insight. Social factors contributing to presentation included isolation, recent divorce, selling his house, and moving from hotel to hotel. Biological contributions to presentation included a history of hyperparathyroidism with chronic hypercalcemia per charts as well as a history of methamphetamine use per collateral from ex-wife.     History was difficult to obtain due to the patient's severe disorganized thinking on interview; he was observed with persecutory delusions pertaining to the realtor and gang members, disorganized behavior as these delusions have led him to flee to different hotels to stay safe/ has called  complaining of being targeted and auditory hallucinations of voices for the past 12 months. Per collateral from ex-wife, Santos has had paranoid ideations since they first met. He has always felt like people are out to get him or trying to rip him off. She says that he has had visual hallucinations (he will point things out that the rest of the family can't  see) for a long time, but the family would just go along with him to avoid making him angry. This timeline and presentation could be consistent with diagnosis of paranoid personality disorder. Things began to worsen around the beginning of the COVID pandemic, when Santos became more isolated and the family started to notice cognitive issues such as impaired memory. Immediately prior to this hospitalization, the ex-wife found Santos in a hotel room with a generator full of gasoline, binoculars, and hunting equipment. This time course does not suggest acute psychosis, however given the patient has never had a full psychiatric work-up, we completed a first-episode psychosis work-up.      Other things that could be contributing to his presentation is hyperparathyroidism with hypercalcemia. However, patient's calcium returned to normal limits on 10/16, and patient continues to have psychosis so likely not a significant contributing factor to patient's presentation.      Patient also continues to be disoriented and his behaviors appear to worsen in the evenings. This raised concern for underlying neurocognitive disorder with delirium. Patient received MRI brain with and without contrast which showed frontal and parietal atrophy greater than would be expected for patient's age. This is consistent with neurocognitive disorder. Patient has continued to improve with decreasing his antipsychotic. Working to get patient transferred to geriatric psychiatry in the short term and to memory care long term.      Given that he currently has psychosis, patient warrants inpatient psychiatric hospitalization to maintain his safety.     Psychiatric Plan by Diagnosis      Today's changes:  - None     # Major Neurocognitive Disorder  # Rule out paranoid personality disorder  1. Medications:  - Discontinued PTA fluoxetine 40 mg, consider restarting at a later time   - Olanzapine 2.5 mg during afternoon and 5 mg at bedtime  - Neurology consult  11/8/24, recommend outpatient follow-up and neuropsychiatric testing     2. Pertinent Labs/Monitoring:   - See above     3. Additional Plans:  - Patient will be treated in therapeutic milieu with appropriate individual and group therapies as described     # Unspecified anxiety vs Generalized Anxiety Disorder  - Monitor for symptoms.  - Fluoxetine held due to suspicion of ongoing manic symptoms     Psychiatric Hospital Course:      Estevan Aaron was admitted to [facility] on a 72 hour hold.   Medications:  PTA fluoxetine was held due to concern for worsening of zelalem   New medications started at the time of admission include Zyprexa.   Increased olanzapine 10 mg at bedtime was to 10 mg BID (10/14)   Increased olanzapine 10 mg BID to 10 mg during day and 15 mg at bedtime (10/15)  10/21: increased olanzapine pm dose from 15 to 20 mg  10/23: started melatonin 3 mg at 7 pm to help patient with circadian rhythm as he has been staying up throughout the night and sleeping a lot during the day and there is some concern for delirium   10/24: consulted anesthesia for MRI brain   10/28: MRI brain complete, decrease AM olanzapine from 10 to 5 mg  10/29: Morning olanzapine decreased to 2.5 mg, evening olanzapine decreased to 15 mg   11/1: Decreased evening olanzapine to 10 mg   11/4: Decreased evening olanzapine to 7.5 mg   11/5: Decreased evening olanzapine to 5 mg   11/11: Moved morning dose of olanzapine to the afternoon as patient appears more agitated in the afternoons/evenings     Care conference 10/18/24 with daughter Maria C and ex-wife Clifford  - Report that patient has always been paranoid since ex-wife first met him. She describes him always feeling like he is getting ripped off.   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 They reported once incidence in which he made a bid on a job and the client paid him, but he never ended up finishing the job  - Wife and him  officially this year, and since selling the house several months ago, patient has been moving from hotel to hotel due to thinking people are out to get him   - When they were selling their house, patient threatened realtors and felt he was being ripped off   - Clifford reports that she started to be afraid to be around him alone  - When he was found in the hotel, he had a generator full of gasoline, binoculars, bullets, and hunting equipment.     10/21/24: updated daughter on Santos's treatment plan      10/23/24: updated daughter on Santos's treatment plan      10/25/24: updated daughter on Santos's treatment plan, including plan to try and get MRI brain done under sedation. She said that Santos's grandfather had dementia and his sister has a brain tumor.      10/28/24: updated daughter on Santos's MRI results. She is planning on coming into town soon.      Care conference 11/1/24 with daughters Maria C and Estefani and ex-wife Clifford  - Discussed dementia diagnosis   - Clifford asked about plans moving forward. Explained that applying for medical assistance is the first step. Explained that application processing time can be very variable. Informed family that Santos will most likely be staying on this inpatient unit during this time.   - Clifford expressed that their family would like to be the power of /have conservatorship for Santos.   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 Reports that he has bills that he has not paid.   - Maria C is planning to move to [place], and Clifford  currently lives in [place]. Family prefer that Santos would in a facility that are near these locations. Discussed with family that they can also try to request a specific location (memory care).   - Clifford reports that he had previously gotten help applying for his social security and medicare, but unsure if it had been approved.   - Informed family that there is a geriatric unit and there might be a transfer if there becomes availability on this unit.   - Family shared that he was completely different just two months ago.   - Estefani shared that his family found his medications in his old truck recently, expressed that it was likely that he was not taking his medications prior to his hospitalization.     11/6/24: updated Maria C on plan to try and transfer Santos to Geriatric unit.     11/11/24: updated Maria C on neurology consult      The risks, benefits, alternatives, and side effects were discussed and understood by the patient and other caregivers.     Medical Assessment and Plan     Medical diagnoses to be addressed this admission:    # Hypertension  - Continue PTA medications  Furosemide 40 mg daily  Lisinopril 40 mg daily  Metoprolol 50 mg daily  Amlodipine 10 mg daily  Clonidine 0.1 mg BID     # Hyperlipidemia  - Continue PTA Atorvastatin 40 mg     # Hyperparathyroidism  # Hypercalcemia, hypophosphoremia   Increased Ca level to 10.9 and decreased Ph level to 2.3 in the ED. Suspicion patient's hypercalcemia could be contributing to symptoms of psychosis.  - Consulted endocrinology, who started cinacalcet 30 mg BID on 10/13  - 10/16 endocrinology recommended continuing to trend calcium and albumin to make sure patient does not become hypocalcemic and recommended holding cinacalcet   - 10/17, calcium and albumin wnl, endo recommended cinacalcet 30 mg once daily   - 10/21, per endo continue cincaclcet and recheck calcium and albumin on October 24  - 10/23: per endo, patient needs to follow up outpatient for further  management of hyperparathyroidism      Medical course: Patient was physically examined by the ED prior to being transferred to the unit and was found to be medically stable and appropriate for admission.      Consults: Psychiatry, Endocrinology (follow-up of hyperthyroidism / hypercalcemia and hypertension), neurology     Checklist     Legal Status: Committed with [facility]     Safety Assessment:   Behavioral Orders   Procedures    Assault precautions    Code 1 - Restrict to Unit    Routine Programming     As clinically indicated    Status 15     Every 15 minutes.    Status Individual Observation     1:1 during evening shift, & night shift.    If patient refuses overnight presence of staff in his room, it's ok to do 15 min checks through the window blinds.    Patient SIO status reviewed with team/RN.  Please also refer to RN/team documentation for add'l detail.    -SIO staff to monitor following which have contributed to patient being on SIO:  Pt has psychosis regarding being followed and attacked, very paranoid, HI    -Possible interventions SIO staff could use to support patient's treatment progress:  Redirect and reassure  -When following observed, team will review discontinuation of SIO:  Psychosis improves     Order Specific Question:   CONTINUOUS 24 hours / day     Answer:   Other     Order Specific Question:   Specify distance     Answer:   10 feet, 5 feet when close to the doors     Order Specific Question:   Indications for SIO     Answer:   Assault risk     Order Specific Question:   Indications for SIO     Answer:   Severe intrusiveness     Risk Assessment:  Risk for harm is elevated.  Risk factors: impulsive and past behaviors  Protective factors: family     SIO: 1:1 during evening and night shift     Disposition: Pending stabilization, medication optimization, & development of a safe discharge plan.      Attestations     This patient was seen and discussed with my attending physician.  Presley Barrera,  MD  PGY-1 Psychiatry Resident

## 2024-11-12 NOTE — PLAN OF CARE
BEH IP Unit Acuity Rating Score (UARS)  Patient is given one point for every criteria they meet.    CRITERIA SCORING   On a 72 hour hold, court hold, committed, stay of commitment, or revocation. 1    Patient LOS on BEH unit exceeds 20 days. 1  LOS: 31   Patient under guardianship, 55+, otherwise medically complex, or under age 11. 1   Suicide ideation without relief of precipitating factors. 0   Current plan for suicide. 0   Current plan for homicide. 0   Imminent risk or actual attempt to seriously harm another without relief of factors precipitating the attempt. 1   Severe dysfunction in daily living (ex: complete neglect for self care, extreme disruption in vegetative function, extreme deterioration in social interactions). 1   Recent (last 7 days) or current physical aggression in the ED or on unit. 0   Restraints or seclusion episode in past 72 hours. 0   Recent (last 7 days) or current verbal aggression, agitation, yelling, etc., while in the ED or unit. 0   Active psychosis. 1   Need for constant or near constant redirection (from leaving, from others, etc).  0   Intrusive or disruptive behaviors. 1   Patient requires 3 or more hours of individualized nursing care per 8-hour shift (i.e. for ADLs, meds, therapeutic interventions). 1   TOTAL 8

## 2024-11-12 NOTE — PLAN OF CARE
"  Problem: Sleep Disturbance  Goal: Adequate Sleep/Rest  Outcome: Not Progressing     Problem: Adult Behavioral Health Plan of Care  Goal: Adheres to Safety Considerations for Self and Others  Outcome: Progressing  Intervention: Develop and Maintain Individualized Safety Plan  Recent Flowsheet Documentation  Taken 11/12/2024 1400 by Rocío Curry RN  Safety Measures: environmental rounds completed  Goal: Absence of New-Onset Illness or Injury  Outcome: Progressing  Intervention: Identify and Manage Fall Risk  Recent Flowsheet Documentation  Taken 11/12/2024 1400 by Rocío Curry RN  Safety Measures: environmental rounds completed     Problem: Suicidal Behavior  Goal: Suicidal Behavior is Absent or Managed  11/12/2024 1436 by Rocío Curry RN  Outcome: Progressing  11/12/2024 1434 by Rocío Curry RN  Outcome: Not Progressing  Intervention: Provide Immediate and Ongoing Protective Physical Environment  Recent Flowsheet Documentation  Taken 11/12/2024 1400 by Rocío Curry RN  Safe Transition Promotion: protective factors promoted   Goal Outcome Evaluation:    Plan of Care Reviewed With: patient    Pt is intermittently visible in the milieu. He is pleasant and cooperative, medication compliant. He is eating and drinking fine. He needs prompting with hygiene. Pt was offered shower and responded \" maybe\".  Pt continues to be confused. He denied anxiety , SI and hallucinations. Initially declined Xray, but eventually agreed to it.  Pt  talked about  his suspicion about his wife taking away his money. Pt also talked about hunting.  Pt did not go tyo any group this shift.     "

## 2024-11-12 NOTE — TELEPHONE ENCOUNTER
R: Bed search ([facility] transfer - 3B) @ 12:07AM:    [facility]: No appropriate bed available on 3B    Pt remains on the work list pending appropriate bed availability.

## 2024-11-12 NOTE — PLAN OF CARE
Team Note Due:  Monday    Assessment/Intervention/Current Symtoms and Care Coordination:  Chart review and met with team, discussed pt progress, symptomology, and response to treatment.  Discussed the discharge plan and any potential impediments to discharge.    Received paperwork from family regarding emergency guardianship for MD to fill out. Paperwork provided to MD.    Discharge Plan or Goal:  Memory care facility     Barriers to Discharge:  Patient requires further psychiatric stabilization due to current symptomology, medication management with changes subject to provider, coordination with outside supports, and aftercare planning. Pt is under civil commitment.     Referral Status:  None at this time    Family would like to consider the following Memory Care Facilities:  [facility] ([place])  [facility] ([place])  [facility] ([place])     Legal Status:  MI Commitment with [facility]  File Number: [id number]  Start and expiration date of commitment: 10/24/24 - 04/24/25    [facility] meds: Haldol, Clozaril, Risperdal, Invega, Zyprexa, Seroquel, Abilify    PPS/CM:  Shelby Chowdary: [phone]  [email or web address].    Contacts:  Maria C Aaron (Daughter): [phone]   Clifford Agnieszka (ex-wife): [phone]     No Del Angel (guardianship/conservatorship ): [phone]  [email or web address]     Upcoming Meetings and Dates/Important Information and next steps:  Update UR on when pt's BX GA plan will term  Follow up with family regarding list of Memory Care facilities  Discharge planning when appropriate  Pt will need to apply for MA with financial counselors before a M[employer]hoices Assessment/SMRT can be requested for waivers.    Provisional Discharge and Change of Status needed at discharge

## 2024-11-13 ENCOUNTER — TELEPHONE (OUTPATIENT)
Dept: BEHAVIORAL HEALTH | Facility: CLINIC | Age: 64
End: 2024-11-13
Payer: COMMERCIAL

## 2024-11-13 VITALS
TEMPERATURE: 98 F | HEART RATE: 59 BPM | RESPIRATION RATE: 16 BRPM | WEIGHT: 232.5 LBS | SYSTOLIC BLOOD PRESSURE: 132 MMHG | OXYGEN SATURATION: 97 % | DIASTOLIC BLOOD PRESSURE: 79 MMHG | BODY MASS INDEX: 37.53 KG/M2

## 2024-11-13 PROCEDURE — 99232 SBSQ HOSP IP/OBS MODERATE 35: CPT | Mod: GC | Performed by: STUDENT IN AN ORGANIZED HEALTH CARE EDUCATION/TRAINING PROGRAM

## 2024-11-13 PROCEDURE — 250N000013 HC RX MED GY IP 250 OP 250 PS 637

## 2024-11-13 PROCEDURE — 124N000002 HC R&B MH UMMC

## 2024-11-13 RX ORDER — DIAPER,BRIEF,INFANT-TODD,DISP
EACH MISCELLANEOUS DAILY PRN
Status: ACTIVE | OUTPATIENT
Start: 2024-11-13

## 2024-11-13 RX ADMIN — AMLODIPINE BESYLATE 10 MG: 5 TABLET ORAL at 08:26

## 2024-11-13 RX ADMIN — CLONIDINE HYDROCHLORIDE 0.1 MG: 0.1 TABLET ORAL at 08:26

## 2024-11-13 RX ADMIN — OLANZAPINE 5 MG: 5 TABLET, ORALLY DISINTEGRATING ORAL at 20:06

## 2024-11-13 RX ADMIN — FUROSEMIDE 40 MG: 40 TABLET ORAL at 08:27

## 2024-11-13 RX ADMIN — ATORVASTATIN CALCIUM 40 MG: 20 TABLET, FILM COATED ORAL at 08:27

## 2024-11-13 RX ADMIN — CLONIDINE HYDROCHLORIDE 0.1 MG: 0.1 TABLET ORAL at 20:05

## 2024-11-13 RX ADMIN — CINACALCET 30 MG: 30 TABLET ORAL at 08:27

## 2024-11-13 RX ADMIN — Medication 1 PATCH: at 08:41

## 2024-11-13 RX ADMIN — LISINOPRIL 40 MG: 10 TABLET ORAL at 08:27

## 2024-11-13 RX ADMIN — OLANZAPINE 2.5 MG: 5 TABLET, ORALLY DISINTEGRATING ORAL at 15:21

## 2024-11-13 RX ADMIN — METOPROLOL SUCCINATE 50 MG: 50 TABLET, EXTENDED RELEASE ORAL at 08:26

## 2024-11-13 RX ADMIN — ACETAMINOPHEN 650 MG: 325 TABLET, FILM COATED ORAL at 20:03

## 2024-11-13 RX ADMIN — Medication 3 MG: at 20:06

## 2024-11-13 NOTE — PLAN OF CARE
Team Note Due:  Monday    Assessment/Intervention/Current Symtoms and Care Coordination:  Chart review and met with team, discussed pt progress, symptomology, and response to treatment.  Discussed the discharge plan and any potential impediments to discharge.    Emergency guardianship completed by MD. Returned to Kristen.    Discharge Plan or Goal:  Memory care facility     Barriers to Discharge:  Patient requires further psychiatric stabilization due to current symptomology, medication management with changes subject to provider, coordination with outside supports, and aftercare planning. Pt is under civil commitment.     Referral Status:  None at this time    Family would like to consider the following Memory Care Facilities:  [facility] ([place])  [facility] ([place])  [facility] ([place])     Legal Status:  MI Commitment with [facility]  File Number: [id number]  Start and expiration date of commitment: 10/24/24 - 04/24/25    [facility] meds: Haldol, Clozaril, Risperdal, Invega, Zyprexa, Seroquel, Abilify    PPS/CM:  Shelby Chowdary: [phone]  [email or web address]    Contacts:  Maria C Agnieszka (Daughter): [phone]   Clifford Agnieszka (ex-wife): [phone]     No Del Angel (guardianship/conservatorship ): [phone]  [email or web address]     Upcoming Meetings and Dates/Important Information and next steps:  Update UR on when pt's BX GA plan will term  Follow up with family regarding list of Memory Care facilities  Discharge planning when appropriate  Pt will need to apply for MA with financial counselors before a M[employer]hoices Assessment/SMRT can be requested for waivers.    Provisional Discharge and Change of Status needed at discharge

## 2024-11-13 NOTE — PLAN OF CARE
Patient attempted to engage however looses train of thought, he was able to recall some memories, writer was not able to assess mental status as patient struggles with confusion, he endorsed hip pain declined medication when offered, he was observed talking on the phone, hygiene and food intake were adequate, patient continues to be on an SIO for intrusive behaviors.   Problem: Psychotic Signs/Symptoms  Goal: Optimal Cognitive Function (Psychotic Signs/Symptoms)  Outcome: Not Progressing     Problem: Adult Behavioral Health Plan of Care  Goal: Adheres to Safety Considerations for Self and Others  Outcome: Progressing  Flowsheets (Taken 11/12/2024 1916)  Adheres to Safety Considerations for Self and Others: making progress toward outcome   Goal Outcome Evaluation:    Plan of Care Reviewed With: patient

## 2024-11-13 NOTE — PLAN OF CARE
BEH IP Unit Acuity Rating Score (UARS)  Patient is given one point for every criteria they meet.    CRITERIA SCORING   On a 72 hour hold, court hold, committed, stay of commitment, or revocation. 1    Patient LOS on BEH unit exceeds 20 days. 1  LOS: 32   Patient under guardianship, 55+, otherwise medically complex, or under age 11. 1   Suicide ideation without relief of precipitating factors. 0   Current plan for suicide. 0   Current plan for homicide. 0   Imminent risk or actual attempt to seriously harm another without relief of factors precipitating the attempt. 1   Severe dysfunction in daily living (ex: complete neglect for self care, extreme disruption in vegetative function, extreme deterioration in social interactions). 1   Recent (last 7 days) or current physical aggression in the ED or on unit. 0   Restraints or seclusion episode in past 72 hours. 0   Recent (last 7 days) or current verbal aggression, agitation, yelling, etc., while in the ED or unit. 0   Active psychosis. 1   Need for constant or near constant redirection (from leaving, from others, etc).  0   Intrusive or disruptive behaviors. 1   Patient requires 3 or more hours of individualized nursing care per 8-hour shift (i.e. for ADLs, meds, therapeutic interventions). 1   TOTAL 8

## 2024-11-13 NOTE — PLAN OF CARE
Problem: Psychotic Signs/Symptoms  Goal: Improved Behavioral Control (Psychotic Signs/Symptoms)  Outcome: Progressing  Intervention: Manage Behavior  Recent Flowsheet Documentation  Taken 11/13/2024 1620 by Tamie Fraser RN  De-Escalation Techniques: verbally redirected     Problem: Anxiety  Goal: Anxiety Reduction or Resolution  Outcome: Progressing   Goal Outcome Evaluation:  Pt was visible in the milieu majority of the shift watching TV. Pt did not engage or socialize with peer. Pt is still confused. Affect is flat. Pt was pleasant upon approach. Mood is calm. He complained of a headache. Requested  and received  PRN Tylenol . Pt reported intervention was effective.Pt is eating and drinking adequately.  Pt denies all mental health and psych symptoms. He was compliant with medications. No safety concerns. No escalation of behavior this shift.

## 2024-11-13 NOTE — TELEPHONE ENCOUNTER
2:40 PM 3B/CRN declined d/t Pt requiring SIO. WL and admit board updated.    R: [place]  Access Inpatient Bed Call Log 11/13/24 8:00 AM    Intake has called facilities that have not updated the bed status within the last 12 hours.                    TRANSFER TO [address] [place] [address] is at capacity.               Pt remains on the work list pending appropriate bed availability.

## 2024-11-13 NOTE — PLAN OF CARE
"  Problem: Adult Behavioral Health Plan of Care  Goal: Adheres to Safety Considerations for Self and Others  Outcome: Progressing  Intervention: Develop and Maintain Individualized Safety Plan  Recent Flowsheet Documentation  Taken 11/13/2024 1300 by Rocío Curry RN  Safety Measures: environmental rounds completed     Problem: Depression  Goal: Improved Mood  Outcome: Progressing  Intervention: Monitor and Manage Depressive Symptoms  Recent Flowsheet Documentation  Taken 11/13/2024 1300 by Rocío Curry RN  Family/Support System Care:   involvement promoted   self-care encouraged     Problem: Suicidal Behavior  Goal: Suicidal Behavior is Absent or Managed  Outcome: Progressing  Intervention: Provide Immediate and Ongoing Protective Physical Environment  Recent Flowsheet Documentation  Taken 11/13/2024 1300 by Rocío Curry, RN  Safe Transition Promotion: protective factors promoted     Problem: Anxiety  Goal: Anxiety Reduction or Resolution  Outcome: Progressing  Intervention: Promote Anxiety Reduction  Recent Flowsheet Documentation  Taken 11/13/2024 1300 by Rocío Curry RN  Family/Support System Care:   involvement promoted   self-care encouraged   Goal Outcome Evaluation:    Plan of Care Reviewed With: patient      Pt is intermittently visible in the milieu. He is pleasant and cooperative, medication compliant. Pt nutrition, hydration and hygiene is adequate. Pt was able to do self care this shift. He cut his nails and took a shower. Bedding had been changed as well.  Pt continues to be confused and suspicious about his medication. Pt asked for a copy of his medications which were administereand was aware that there had been a medication reduction and he wanted to make sure of that. Pt cannot specify what it was. Pt said that he is \"trying to catch up\". Pt has orders for compression stockings. Pt complained of pain on butt cheeks. New orders for Hydrocortisone " cream for red rash in between butt cheeks.

## 2024-11-13 NOTE — PLAN OF CARE
Brief Psychotherapy Note    Therapist checked in with Pt to remind Pt about psychotherapy service available.    Pt response: Pt not interested currently in meeting 1:1.    Plan: Writer will remain available to Pt, encouraged Pt to attend group sessions.
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
## 2024-11-13 NOTE — TELEPHONE ENCOUNTER
R: [place]  Access Inpatient Bed Call Log  11/13/2024 12:31 AM  Intake has called facilities that have not updated their bed status within the last 12 hours.??      ADULTS:     *[facility] Only: Transfer  [place] [facility]: @ Cap per website.     Pt remains on waitlist pending appropriate placement availability.

## 2024-11-14 ENCOUNTER — TELEPHONE (OUTPATIENT)
Dept: BEHAVIORAL HEALTH | Facility: CLINIC | Age: 64
End: 2024-11-14
Payer: COMMERCIAL

## 2024-11-14 PROCEDURE — 250N000013 HC RX MED GY IP 250 OP 250 PS 637

## 2024-11-14 PROCEDURE — 90853 GROUP PSYCHOTHERAPY: CPT

## 2024-11-14 PROCEDURE — 99232 SBSQ HOSP IP/OBS MODERATE 35: CPT | Mod: GC | Performed by: STUDENT IN AN ORGANIZED HEALTH CARE EDUCATION/TRAINING PROGRAM

## 2024-11-14 PROCEDURE — 97150 GROUP THERAPEUTIC PROCEDURES: CPT | Mod: GO

## 2024-11-14 PROCEDURE — 124N000002 HC R&B MH UMMC

## 2024-11-14 RX ADMIN — ACETAMINOPHEN 650 MG: 325 TABLET, FILM COATED ORAL at 06:13

## 2024-11-14 RX ADMIN — OLANZAPINE 5 MG: 5 TABLET, ORALLY DISINTEGRATING ORAL at 23:04

## 2024-11-14 RX ADMIN — CINACALCET 30 MG: 30 TABLET ORAL at 08:52

## 2024-11-14 RX ADMIN — CLONIDINE HYDROCHLORIDE 0.1 MG: 0.1 TABLET ORAL at 08:53

## 2024-11-14 RX ADMIN — ATORVASTATIN CALCIUM 40 MG: 20 TABLET, FILM COATED ORAL at 08:53

## 2024-11-14 RX ADMIN — FUROSEMIDE 40 MG: 40 TABLET ORAL at 08:52

## 2024-11-14 RX ADMIN — AMLODIPINE BESYLATE 10 MG: 5 TABLET ORAL at 08:52

## 2024-11-14 RX ADMIN — LISINOPRIL 40 MG: 10 TABLET ORAL at 08:52

## 2024-11-14 RX ADMIN — OLANZAPINE 2.5 MG: 5 TABLET, ORALLY DISINTEGRATING ORAL at 14:42

## 2024-11-14 RX ADMIN — Medication 1 PATCH: at 08:55

## 2024-11-14 ASSESSMENT — ACTIVITIES OF DAILY LIVING (ADL)
ADLS_ACUITY_SCORE: 0
DRESS: STREET CLOTHES;INDEPENDENT
DRESS: STREET CLOTHES;INDEPENDENT;PROMPTS
HYGIENE/GROOMING: INDEPENDENT;HANDWASHING
ADLS_ACUITY_SCORE: 0
ORAL_HYGIENE: INDEPENDENT;PROMPTS
ADLS_ACUITY_SCORE: 0
ADLS_ACUITY_SCORE: 0
ORAL_HYGIENE: INDEPENDENT;PROMPTS
ADLS_ACUITY_SCORE: 0
HYGIENE/GROOMING: HANDWASHING;INDEPENDENT
LAUNDRY: UNABLE TO COMPLETE
ADLS_ACUITY_SCORE: 0
LAUNDRY: UNABLE TO COMPLETE
ADLS_ACUITY_SCORE: 0
ADLS_ACUITY_SCORE: 0

## 2024-11-14 NOTE — TELEPHONE ENCOUNTER
R: [place]  Access Inpatient Bed Call Log 11/14/24 @ 7:03 am:    Intake has called facilities that have not updated the bed status within the last 12 hours.                                 [facility] is at capacity.                  Pt remains on the work list pending appropriate bed on 3B availability.

## 2024-11-14 NOTE — PLAN OF CARE
Initial meeting note:    Therapist introduced self to patient and discussed psychotherapy service available to patient.     Pt response: Pt not interested currently in meeting 1:1; therapist will continue remaining available for pt     Plan: Pt was encouraged to attend groups and therapist will remain available for 1:1 sessions    Pt talked with writer for a few minutes, was very confused and disorganized. Pt went to group to work on a project. Pt was kind and polite during out conversation.

## 2024-11-14 NOTE — PLAN OF CARE
Team Note Due:  Monday    Assessment/Intervention/Current Symtoms and Care Coordination:  Chart review and met with team, discussed pt progress, symptomology, and response to treatment.  Discussed the discharge plan and any potential impediments to discharge.    Received update from [facility] that pt's [facility] plan is denying coverage but an expedited appeal was requested. [facility] requested update on discharge plan/barriers. Update provided.    Received update from Clifford that pt's [employer] plan will term on 12/31/24. Updated [facility].    Discharge Plan or Goal:  Memory care facility     Barriers to Discharge:  Patient requires further psychiatric stabilization due to current symptomology, medication management with changes subject to provider, coordination with outside supports, and aftercare planning. Pt is under civil commitment.     Referral Status:  None at this time    Family would like to consider the following Memory Care Facilities:  [facility] ([facility] ([place])     Legal Status:  [place] Commitment with [facility]  File Number: [id number]  Start and expiration date of commitment: 10/24/24 - 04/24/25    [facility] meds: Haldol, Clozaril, Risperdal, Invega, Zyprexa, Seroquel, Abilify    PPS/CM:  Shelby Chowdary: [phone]  [email or web address]    Contacts:  Maria C Aaron (Daughter): [phone]   Clifford Agnieszka (ex-wife): [phone]     No Del Angel (guardianship/conservatorship ): [phone]  [email or web address]     Upcoming Meetings and Dates/Important Information and next steps:  Follow up with family regarding emergency guardianship/conservatorship and list of Memory Care facilities  Discharge planning when appropriate  Pt does not qualify for MA per financial counselor on 11/8/2024. Pt will likely privately pay for Memory Care until he qualifies for MA/waivers in the future.  Pt will need to apply for MA before a  [place] Assessment/SMRT can be completed for waivers.    Provisional Discharge and Change of Status needed at discharge

## 2024-11-14 NOTE — PROGRESS NOTES
"  ----------------------------------------------------------------------------------------------------------  [facility]  Psychiatry Progress Note  Hospital Day #33     Interim History:     The patient's care was discussed with the treatment team and chart notes were reviewed.    Vitals: VSS  Sleep: 5.5 hours (11/13/24 0632)  Scheduled medications: Took all scheduled medications as prescribed  Psychiatric PRN medications: No psychiatric prns given    Staff Report:   - Intermittently visible in the milieu  - Able to perform self care yesterday. He cut his nails and took a shower.   - Confused and suspicious about his medications  - Patient reported pain on his bottom. Reported improvement with Hydrocortisone cream.   - Patient reported a headache. Reported improvement after PRN tylenol.   - Patient woke up around 0230 and stayed awake in his room for a period of time before coming out to the [facility]. Patient was confused and accusing staff of taking his clothes. After returning to his room, he came out to the [facility] again and asked for a cigarette.     Please see staff notes for details.      Subjective:     Patient Interview:  Estevan Aaron was interviewed in his room. He was suspicious about his medications and told the team he was getting illegal pills. Declined offer when team asked if he wanted to talk about his medications. Reports that the rash on his bottom is less itchy and he longer has any pain in this area after using lotion.     Reports his mood is good. Shares that he slept well last night. He says he remembers waking up at 2am and walked to the [facility]. He reports falling asleep after coming back to his room. Patient walks out of his room mid-interview, saying he had to throw away his cups, and then comes back.     Reports that he is here because \"my freaky wife brought me in here.\" Shares that \"It all felt like a dream.\" He endorses meeting a neurologist who " "told him he was a smart marina. He says the neurologist did not assess for  strength because \"I could crush it.\" He does not remember getting an MRI of his brain.     Patient was engaged and interested in looking at the MRI of his brain. The team discussed with the patient that he had early-stage dementia and explained the results of his MRI. Patient asked for a picture of his brain images.     ROS:  Negative except for above.      Objective:     Vitals:  /79   Pulse 59   Temp 98  F (36.7  C)   Resp 16   Wt 105.5 kg (232 lb 8 oz)   SpO2 97%   BMI 37.53 kg/m      Allergies:  No Known Allergies    Current Medications:  Scheduled:  Current Facility-Administered Medications   Medication Dose Route Frequency Provider Last Rate Last Admin    acetaminophen (TYLENOL) tablet 650 mg  650 mg Oral Q4H PRN Nikki Caceres MD   650 mg at 11/14/24 0613    alum & mag hydroxide-simethicone (MAALOX) suspension 30 mL  30 mL Oral Q4H PRN Nikki Caceres MD   30 mL at 10/17/24 0837    amLODIPine (NORVASC) tablet 10 mg  10 mg Oral Daily Presley Barrera MD   10 mg at 11/13/24 0826    atorvastatin (LIPITOR) tablet 40 mg  40 mg Oral Daily Nikki Caceres MD   40 mg at 11/13/24 0827    cholecalciferol (VITAMIN D3) capsule 1,250 mcg  1,250 mcg Oral Q7 Days Sirisha EveliaDO   1,250 mcg at 11/12/24 1153    cinacalcet (SENSIPAR) tablet 30 mg  30 mg Oral Daily Presley Barrera MD   30 mg at 11/13/24 0827    cloNIDine (CATAPRES) tablet 0.1 mg  0.1 mg Oral BID Presley Barrera MD   0.1 mg at 11/13/24 2005    furosemide (LASIX) tablet 40 mg  40 mg Oral Daily Presley Barrera MD   40 mg at 11/13/24 0827    gabapentin (NEURONTIN) capsule 100 mg  100 mg Oral Q6H PRN Nikki Caceres MD        hydrocortisone (CORTAID) 0.5 % cream   Topical Daily PRN Savi Chamorro MD        lisinopril (ZESTRIL) tablet 40 mg  40 mg Oral Daily Presley Barrera MD   40 mg at 11/13/24 0827    melatonin tablet 3 mg  3 mg Oral At Bedtime Bruce, " MD Presley   3 mg at 11/13/24 2006    metoprolol succinate ER (TOPROL XL) 24 hr tablet 50 mg  50 mg Oral Daily Presley Barrera MD   50 mg at 11/13/24 0826    nicotine (NICODERM CQ) 14 MG/24HR 24 hr patch 1 patch  1 patch Transdermal Daily Evelia Grimm DO   1 patch at 11/13/24 0841    nicotine (NICODERM CQ) 21 MG/24HR 24 hr patch 1 patch  1 patch Transdermal Daily PRN Britt Kang MD   1 patch at 10/13/24 1611    nicotine (NICORETTE) gum 2 mg  2 mg Buccal Q1H PRN González Garcia MD   2 mg at 10/24/24 1254    OLANZapine (zyPREXA) tablet 5 mg  5 mg Oral TID PRN Evelia Grimm DO   5 mg at 11/12/24 0249    Or    OLANZapine (zyPREXA) injection 5 mg  5 mg Intramuscular TID PRN Evelia Grimm DO        OLANZapine zydis (zyPREXA) ODT half-tab 2.5 mg  2.5 mg Oral QAM Presley Barrera MD   2.5 mg at 11/13/24 1521    OLANZapine zydis (zyPREXA) ODT tab 5 mg  5 mg Oral At Bedtime González Garcia MD   5 mg at 11/13/24 2006    polyethylene glycol (MIRALAX) Packet 17 g  17 g Oral Daily PRN Nikki Caceres MD        traZODone (DESYREL) half-tab 25 mg  25 mg Oral At Bedtime PRN Evelia Grimm DO   25 mg at 11/12/24 0249    Or    traZODone (DESYREL) tablet 50 mg  50 mg Oral At Bedtime PRN Evelia rGimm DO           PRN:  Current Facility-Administered Medications   Medication Dose Route Frequency Provider Last Rate Last Admin    acetaminophen (TYLENOL) tablet 650 mg  650 mg Oral Q4H PRN Nikki Caceres MD   650 mg at 11/14/24 0613    alum & mag hydroxide-simethicone (MAALOX) suspension 30 mL  30 mL Oral Q4H PRN Nikki Caceres MD   30 mL at 10/17/24 0837    amLODIPine (NORVASC) tablet 10 mg  10 mg Oral Daily Presley Barrera MD   10 mg at 11/13/24 0826    atorvastatin (LIPITOR) tablet 40 mg  40 mg Oral Daily Nikki Caceres MD   40 mg at 11/13/24 0827    cholecalciferol (VITAMIN D3) capsule 1,250 mcg  1,250 mcg Oral Q7 Days Evelia Grimm DO   1,250 mcg at 11/12/24 1153    cinacalcet (SENSIPAR) tablet 30 mg  30 mg Oral Daily  Presley Barrera MD   30 mg at 11/13/24 0827    cloNIDine (CATAPRES) tablet 0.1 mg  0.1 mg Oral BID Presley Barrera MD   0.1 mg at 11/13/24 2005    furosemide (LASIX) tablet 40 mg  40 mg Oral Daily Presley Barrera MD   40 mg at 11/13/24 0827    gabapentin (NEURONTIN) capsule 100 mg  100 mg Oral Q6H PRN Nikki Caceres MD        hydrocortisone (CORTAID) 0.5 % cream   Topical Daily PRN Savi Chamorro MD        lisinopril (ZESTRIL) tablet 40 mg  40 mg Oral Daily Presley Barrera MD   40 mg at 11/13/24 0827    melatonin tablet 3 mg  3 mg Oral At Bedtime Presley Barrera MD   3 mg at 11/13/24 2006    metoprolol succinate ER (TOPROL XL) 24 hr tablet 50 mg  50 mg Oral Daily Presley Barrera MD   50 mg at 11/13/24 0826    nicotine (NICODERM CQ) 14 MG/24HR 24 hr patch 1 patch  1 patch Transdermal Daily Evelia Grimm DO   1 patch at 11/13/24 0841    nicotine (NICODERM CQ) 21 MG/24HR 24 hr patch 1 patch  1 patch Transdermal Daily PRN Britt Kang MD   1 patch at 10/13/24 1611    nicotine (NICORETTE) gum 2 mg  2 mg Buccal Q1H PRN González Garcia MD   2 mg at 10/24/24 1254    OLANZapine (zyPREXA) tablet 5 mg  5 mg Oral TID PRN Evelia Grimm DO   5 mg at 11/12/24 0249    Or    OLANZapine (zyPREXA) injection 5 mg  5 mg Intramuscular TID PRN Evelia Grimm DO        OLANZapine zydis (zyPREXA) ODT half-tab 2.5 mg  2.5 mg Oral QAM Presley Barrera MD   2.5 mg at 11/13/24 1521    OLANZapine zydis (zyPREXA) ODT tab 5 mg  5 mg Oral At Bedtime González Garcia MD   5 mg at 11/13/24 2006    polyethylene glycol (MIRALAX) Packet 17 g  17 g Oral Daily PRN Nikki Caceres MD        traZODone (DESYREL) half-tab 25 mg  25 mg Oral At Bedtime PRN Evelia Grimm DO   25 mg at 11/12/24 0249    Or    traZODone (DESYREL) tablet 50 mg  50 mg Oral At Bedtime PRN Evelia Grimm DO           Labs and Imaging:    Data this admission:  - CBC unremarkable  - CMP unremarkable  - TSH normal  - UDS negative  - Vit D low  - Hgb A1c 5.9 (10/13/24)  -  "Lipids unremarkable  - Vit B12 normal  - Folate normal  - Urinalysis unremarkable  - EKG normal sinus rhythm, QTc 390  - Head CT showed no acute changes  - HIV non reactive  - Treponema antibody non reactive  - ESR wnl   - Ceruloplasmin wnl   - BOOGIE negative  - Lyme negative      Parathyroid hormone:   10/13: 85     Calcium:  10/14: 11.4  10/16: 10.3  10/17: 10.3  10/19: 9.8  10/21: 10.6  10/23: 10.3     Albumin:  10/14: 4.3  10/16: 4.3  10/17: 4.1  10/19: 4.2  10/21: 4.5  10/23: 4.3      CXR:   Impression:   1. No definite radiographic evidence of asbestosis. If clinically  indicated, consider evaluation with high resolution chest CT.  2. Nonspecific left costophrenic blunting. Given symmetrically low  lung volumes may be related to poor inspiratory effort/timing.  3. Pulmonary vascular congestion.     MRI:   IMPRESSION:  1. No acute intracranial pathology.  2. No focal lesion or structural abnormality.   3. Symmetric frontoparietal cortical volume loss slightly more than  expected for age.     Mental Status Exam:     Oriented to:  Oriented to time, not oriented to location or building.   General:  Awake and Confused  Appearance:  appears stated age and Dressed in hospital pants and his own shirt  Behavior/Attitude:  Calm and Indifferent  Eye Contact: Appropriate  Psychomotor: No evidence of tics, dystonia, or tardive dyskinesia  no catatonia present  Speech:  appropriate volume/tone  Language: Fluent in English with appropriate syntax and vocabulary.  Mood:  \"Good\"  Affect:  congruent with mood and irritable  Thought Process:  looseness of association, disorganized, and confused  Thought Content:  Did not assess SI/HI/AH/VH; delusions of confusion   Associations:  loose  Insight:  impaired   Judgment:  partial   Impulse control: limited  Attention Span:  decreased  Concentration:  distractible   Recent and Remote Memory:  recent memory impaired, remote memory intact   Fund of Knowledge: average  Muscle Strength and " Tone: normal  Gait and Station: Normal     Psychiatric Assessment     Estevan Aaron is a 64 year old male with previous psychiatric diagnoses of GEORGINA admitted from the ER on 10/12/2024 due to concern for HI and psychosis in the context of medical issues (hyperthyroidism, hypercalcemia), psychosocial stressors including with recent divorce and selling his home. This is the patient's first psychiatric hospitalization. Significant symptoms on admission included delusions of persecution with grandiose beliefs, homicidal ideations, as well as disorganized thinking and behavior. The MSE on admission was pertinent for a thought process which was perseverative, circumstantial, tangential, disorganized and tangential; with rambling and looseness of associations. Psychological contributions to presentation included lack of insight. Social factors contributing to presentation included isolation, recent divorce, selling his house, and moving from hotel to hotel. Biological contributions to presentation included a history of hyperparathyroidism with chronic hypercalcemia per charts as well as a history of methamphetamine use per collateral from ex-wife.     History was difficult to obtain due to the patient's severe disorganized thinking on interview; he was observed with persecutory delusions pertaining to the realtor and gang members, disorganized behavior as these delusions have led him to flee to different hotels to stay safe/ has called  complaining of being targeted and auditory hallucinations of voices for the past 12 months. Per collateral from ex-wife, Santos has had paranoid ideations since they first met. He has always felt like people are out to get him or trying to rip him off. She says that he has had visual hallucinations (he will point things out that the rest of the family can't see) for a long time, but the family would just go along with him to avoid making him angry. This timeline and presentation could be  consistent with diagnosis of paranoid personality disorder. Things began to worsen around the beginning of the COVID pandemic, when Santos became more isolated and the family started to notice cognitive issues such as impaired memory. Immediately prior to this hospitalization, the ex-wife found Santos in a hotel room with a generator full of gasoline, binoculars, and hunting equipment. This time course does not suggest acute psychosis, however given the patient has never had a full psychiatric work-up, we completed a first-episode psychosis work-up.      Other things that could be contributing to his presentation is hyperparathyroidism with hypercalcemia. However, patient's calcium returned to normal limits on 10/16, and patient continues to have psychosis so likely not a significant contributing factor to patient's presentation.      Patient also continues to be disoriented and his behaviors appear to worsen in the evenings. This raised concern for underlying neurocognitive disorder with delirium. Patient received MRI brain with and without contrast which showed frontal and parietal atrophy greater than would be expected for patient's age. This is consistent with neurocognitive disorder. Patient has continued to improve with decreasing his antipsychotic. Working to get patient transferred to geriatric psychiatry in the short term and to memory care long term.      Given that he currently has psychosis, patient warrants inpatient psychiatric hospitalization to maintain his safety.     Psychiatric Plan by Diagnosis      Today's changes:  - None     # Major Neurocognitive Disorder  # Rule out paranoid personality disorder  1. Medications:  - Discontinued PTA fluoxetine 40 mg, consider restarting at a later time   - Olanzapine 2.5 mg during afternoon and 5 mg at bedtime  - Neurology consult 11/8/24, recommend outpatient follow-up and neuropsychiatric testing     2. Pertinent Labs/Monitoring:   - See above     3. Additional  Plans:  - Patient will be treated in therapeutic milieu with appropriate individual and group therapies as described     # Unspecified anxiety vs Generalized Anxiety Disorder  - Monitor for symptoms.  - Fluoxetine held due to suspicion of ongoing manic symptoms     Psychiatric Hospital Course:      Estevan Aaron was admitted to [facility] on a 72 hour hold.   Medications:  PTA fluoxetine was held due to concern for worsening of zelalem   New medications started at the time of admission include Zyprexa.   Increased olanzapine 10 mg at bedtime was to 10 mg BID (10/14)   Increased olanzapine 10 mg BID to 10 mg during day and 15 mg at bedtime (10/15)  10/21: increased olanzapine pm dose from 15 to 20 mg  10/23: started melatonin 3 mg at 7 pm to help patient with circadian rhythm as he has been staying up throughout the night and sleeping a lot during the day and there is some concern for delirium   10/24: consulted anesthesia for MRI brain   10/28: MRI brain complete, decrease AM olanzapine from 10 to 5 mg  10/29: Morning olanzapine decreased to 2.5 mg, evening olanzapine decreased to 15 mg   11/1: Decreased evening olanzapine to 10 mg   11/4: Decreased evening olanzapine to 7.5 mg   11/5: Decreased evening olanzapine to 5 mg   11/11: Moved morning dose of olanzapine to the afternoon as patient appears more agitated in the afternoons/evenings     Care conference 10/18/24 with daughter Maria C and ex-wife Clifford  - Report that patient has always been paranoid since ex-wife first met him. She describes him always feeling like he is getting ripped off.   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 They reported once incidence in which he made a bid on a job and the client paid him, but he never ended up finishing the job  - Wife and him  officially this year, and since selling the house several months ago, patient has been moving from hotel to hotel due to thinking people are out to get him   - When they were selling their house, patient threatened realtors and felt he was being ripped off   - Clifford reports that she started to be afraid to be around him alone  - When he was found in the hotel, he had a generator full of gasoline, binoculars, bullets, and hunting equipment.     10/21/24: updated daughter on Santos's treatment plan      10/23/24: updated daughter on Santos's treatment plan      10/25/24: updated daughter on Santos's treatment plan, including plan to try and get MRI brain done under sedation. She said that Santos's grandfather had dementia and his sister has a brain tumor.      10/28/24: updated daughter on Santos's MRI results. She is planning on coming into town soon.      Care conference 11/1/24 with daughters Maria C and Estefani and ex-wife Clifford  - Discussed dementia diagnosis   - Clifford asked about plans moving forward. Explained that applying for medical assistance is the first step. Explained that application processing time can be very variable. Informed family that Santos will most likely be staying on this inpatient unit during this time.   - Clifford expressed that their family would like to be the power of /have conservatorship for Santos.   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 Reports that he has bills that he has not paid.   - Maria C is planning to move to [place], and Clifford currently lives in [place]. Family prefer that Santos would in a facility that are near these locations. Discussed with family that they can  also try to request a specific location (memory care).   - Clifford reports that he had previously gotten help applying for his social security and medicare, but unsure if it had been approved.   - Informed family that there is a geriatric unit and there might be a transfer if there becomes availability on this unit.   - Family shared that he was completely different just two months ago.   - Estefani shared that his family found his medications in his old truck recently, expressed that it was likely that he was not taking his medications prior to his hospitalization.      11/6/24: updated Maria C on plan to try and transfer Santos to Geriatric unit.      11/11/24: updated Maria C on neurology consult      The risks, benefits, alternatives, and side effects were discussed and understood by the patient and other caregivers.     Medical Assessment and Plan     Medical diagnoses to be addressed this admission:    # Hypertension  - Continue PTA medications  Furosemide 40 mg daily  Lisinopril 40 mg daily  Metoprolol 50 mg daily  Amlodipine 10 mg daily  Clonidine 0.1 mg BID     # Hyperlipidemia  - Continue PTA Atorvastatin 40 mg     # Hyperparathyroidism  # Hypercalcemia, hypophosphoremia   Increased Ca level to 10.9 and decreased Ph level to 2.3 in the ED. Suspicion patient's hypercalcemia could be contributing to symptoms of psychosis.  - Consulted endocrinology, who started cinacalcet 30 mg BID on 10/13  - 10/16 endocrinology recommended continuing to trend calcium and albumin to make sure patient does not become hypocalcemic and recommended holding cinacalcet   - 10/17, calcium and albumin wnl, endo recommended cinacalcet 30 mg once daily   - 10/21, per endo continue cincaclcet and recheck calcium and albumin on October 24  - 10/23: per endo, patient needs to follow up outpatient for further management of hyperparathyroidism      Medical course: Patient was physically examined by the ED prior to being transferred to the unit  and was found to be medically stable and appropriate for admission.      Consults: Psychiatry, Endocrinology (follow-up of hyperthyroidism / hypercalcemia and hypertension), neurology     Checklist     Legal Status: Committed with [facility] (Haldol, Clozaril, Risperdal, Invega, Zyprexa, Seroquel, Abilify)    Safety Assessment:   Behavioral Orders   Procedures    Assault precautions    Code 1 - Restrict to Unit    Routine Programming     As clinically indicated    Status 15     Every 15 minutes.    Status Individual Observation     1:1 during evening shift, & night shift.    If patient refuses overnight presence of staff in his room, it's ok to do 15 min checks through the window blinds.    Patient SIO status reviewed with team/RN.  Please also refer to RN/team documentation for add'l detail.    -SIO staff to monitor following which have contributed to patient being on SIO:  Pt has psychosis regarding being followed and attacked, very paranoid, HI    -Possible interventions SIO staff could use to support patient's treatment progress:  Redirect and reassure  -When following observed, team will review discontinuation of SIO:  Psychosis improves     Order Specific Question:   CONTINUOUS 24 hours / day     Answer:   Other     Order Specific Question:   Specify distance     Answer:   10 feet, 5 feet when close to the doors     Order Specific Question:   Indications for SIO     Answer:   Assault risk     Order Specific Question:   Indications for SIO     Answer:   Severe intrusiveness       Risk Assessment:  Risk for harm is elevated.  Risk factors: impulsive and past behaviors  Protective factors: family      Disposition: Pending stabilization, medication optimization, & development of a safe discharge plan.      Attestations     The patient was seen and discussed with my resident and attending physician.   Qi Jean, MS3  [facility] Medical Student      Resident/Fellow Attestation   I, Savi Chamorro MD, was present with  the medical/ALLEN student who participated in the service and in the documentation of the note.  I have verified the history and personally performed the physical exam and medical decision making.  I agree with the assessment and plan of care as documented in the note.        Savi Chamorro MD  PGY1  Date of Service (when I saw the patient): 11/14/24

## 2024-11-14 NOTE — PLAN OF CARE
"Pt woke up at about 0230 and stayed in his room for a while before coming out to the lounge. Pt was confused and was accusing staff of taking his clothes. Pt was agitated telling staff \"you are crazy all of you\". Prn Trazodone was offered but pt declined. Pt returned to his room but did not go back to bed. Pt was out again and was asking for cigarette and staff redirected him. [facility]. Slept  for 4 hours this shift. Prn Tylenol was given for headache.    Goal Outcome Evaluation:    Problem: Psychotic Signs/Symptoms  Goal: Improved Behavioral Control (Psychotic Signs/Symptoms)  Outcome: Progressing     Problem: Sleep Disturbance  Goal: Adequate Sleep/Rest  Outcome: Progressing                         "

## 2024-11-14 NOTE — PLAN OF CARE
Goal Outcome Evaluation:    Plan of Care Reviewed With: patient Plan of Care Reviewed With: patient    Overall Patient Progress: improvingOverall Patient Progress: improving       Problem: Adult Behavioral Health Plan of Care  Goal: Plan of Care Review  Outcome: Progressing  Flowsheets  Taken 11/14/2024 1247  Plan of Care Reviewed With: patient  Overall Patient Progress: improving  Patient Agreement with Plan of Care: agrees  Taken 11/14/2024 1200  Patient Agreement with Plan of Care: agrees     Pt was up and visible in the milieu intermittently. Pt minimally socializing with peers. Pt remains disorganized with delusional statement, stated, he wants the car keys to go visit his ex wife.  Pt denies SI/HI/SIB, hallucinations, anxiety and depression. Pt denies pain.  Pt was on assault precautions. Safety was maintained throughout the shift. Pt is medication complaint but requires several prompts. Metoprolol was held due to parameters not met.     Blood pressure 120/75, pulse 56, temperature 97.8  F (36.6  C), resp. rate 16, weight 105.5 kg (232 lb 8 oz), SpO2 95%.

## 2024-11-14 NOTE — PLAN OF CARE
BEH IP Unit Acuity Rating Score (UARS)  Patient is given one point for every criteria they meet.    CRITERIA SCORING   On a 72 hour hold, court hold, committed, stay of commitment, or revocation. 1    Patient LOS on BEH unit exceeds 20 days. 1  LOS: 33   Patient under guardianship, 55+, otherwise medically complex, or under age 11. 1   Suicide ideation without relief of precipitating factors. 0   Current plan for suicide. 0   Current plan for homicide. 0   Imminent risk or actual attempt to seriously harm another without relief of factors precipitating the attempt. 1   Severe dysfunction in daily living (ex: complete neglect for self care, extreme disruption in vegetative function, extreme deterioration in social interactions). 1   Recent (last 7 days) or current physical aggression in the ED or on unit. 0   Restraints or seclusion episode in past 72 hours. 0   Recent (last 7 days) or current verbal aggression, agitation, yelling, etc., while in the ED or unit. 0   Active psychosis. 1   Need for constant or near constant redirection (from leaving, from others, etc).  0   Intrusive or disruptive behaviors. 1   Patient requires 3 or more hours of individualized nursing care per 8-hour shift (i.e. for ADLs, meds, therapeutic interventions). 1   TOTAL 8

## 2024-11-14 NOTE — CARE PLAN
Rehab Group    Start time: 1115  End time: 1400  Patient time total: 55 minutes    attended partial group    #5 attended   Group Type: OT Clinic   Group Topic Covered: activity therapy and coping skills     Group Session Detail:  Coping skill exploration, creative expression within personally meaningful activities, and observation of social, cognitive, and kinesthetic performance skills     Patient Response/Contribution:  positive affect, cooperative with task, and socially appropriate     Patient Detail:  Expressed minimal interest to initiate an independent project. However, appeared highly invested in helping a peer with her wooden box. Demonstrated good problem solving as he worked towards opening a wooden box that had been painted shut; used a variety of tools and strategies appropriately. IND to gather materials, organize work space, and sequence to completion. Friendly and E=engaged in appropriate conversation with peers.         [id number] OT Group (2 or more in attendance)      Patient Active Problem List   Diagnosis    Hyperlipidemia LDL goal <130    Hypertension goal BP (blood pressure) < 130/80    Hypercalcemia    Hyperparathyroidism (H)    Thalassemia, unspecified type    Psychosis, unspecified psychosis type (H)    Acute psychosis (H)

## 2024-11-14 NOTE — TELEPHONE ENCOUNTER
R: Bed search ([facility] transfer - 3B) @ 1:13AM:     [facility]: No appropriate bed available on 3B     Pt remains on the work list pending appropriate bed availability.

## 2024-11-14 NOTE — PLAN OF CARE
Problem: Psychotic Signs/Symptoms  Goal: Improved Behavioral Control (Psychotic Signs/Symptoms)  Outcome: Progressing  Intervention: Manage Behavior  Recent Flowsheet Documentation  Taken 11/14/2024 1625 by Tamie Fraser RN  De-Escalation Techniques: verbally redirected     Problem: Anxiety  Goal: Anxiety Reduction or Resolution  Outcome: Progressing   Goal Outcome Evaluation:    Pt was visible in the milieu most of the shift watching TV and interacting with select peers.Pt is  calm. Thought process is confused. He denied mental health and psych symptoms. Hydration and nutrition adequate.Pt is compliant with medications but requires multiple prompts and reassurance. Hygiene is appropriate. Pt was cooperative, compliant with medication and contracted for safety.

## 2024-11-15 ENCOUNTER — TELEPHONE (OUTPATIENT)
Dept: BEHAVIORAL HEALTH | Facility: CLINIC | Age: 64
End: 2024-11-15
Payer: COMMERCIAL

## 2024-11-15 PROCEDURE — 124N000002 HC R&B MH UMMC

## 2024-11-15 PROCEDURE — 250N000013 HC RX MED GY IP 250 OP 250 PS 637

## 2024-11-15 PROCEDURE — 99232 SBSQ HOSP IP/OBS MODERATE 35: CPT | Mod: GC | Performed by: STUDENT IN AN ORGANIZED HEALTH CARE EDUCATION/TRAINING PROGRAM

## 2024-11-15 PROCEDURE — H2032 ACTIVITY THERAPY, PER 15 MIN: HCPCS

## 2024-11-15 RX ADMIN — ATORVASTATIN CALCIUM 40 MG: 20 TABLET, FILM COATED ORAL at 07:53

## 2024-11-15 RX ADMIN — LISINOPRIL 40 MG: 10 TABLET ORAL at 07:53

## 2024-11-15 RX ADMIN — FUROSEMIDE 40 MG: 40 TABLET ORAL at 07:53

## 2024-11-15 RX ADMIN — Medication 3 MG: at 21:53

## 2024-11-15 RX ADMIN — METOPROLOL SUCCINATE 50 MG: 50 TABLET, EXTENDED RELEASE ORAL at 07:53

## 2024-11-15 RX ADMIN — OLANZAPINE 2.5 MG: 5 TABLET, ORALLY DISINTEGRATING ORAL at 14:51

## 2024-11-15 RX ADMIN — AMLODIPINE BESYLATE 10 MG: 5 TABLET ORAL at 07:53

## 2024-11-15 RX ADMIN — CLONIDINE HYDROCHLORIDE 0.1 MG: 0.1 TABLET ORAL at 07:54

## 2024-11-15 RX ADMIN — Medication 1 PATCH: at 08:01

## 2024-11-15 RX ADMIN — CLONIDINE HYDROCHLORIDE 0.1 MG: 0.1 TABLET ORAL at 21:53

## 2024-11-15 RX ADMIN — OLANZAPINE 5 MG: 5 TABLET, ORALLY DISINTEGRATING ORAL at 21:53

## 2024-11-15 RX ADMIN — GABAPENTIN 100 MG: 100 CAPSULE ORAL at 04:18

## 2024-11-15 RX ADMIN — CINACALCET 30 MG: 30 TABLET ORAL at 07:53

## 2024-11-15 ASSESSMENT — ACTIVITIES OF DAILY LIVING (ADL)
ADLS_ACUITY_SCORE: 0
LAUNDRY: WITH SUPERVISION
ADLS_ACUITY_SCORE: 0
HYGIENE/GROOMING: HANDWASHING;SHOWER;INDEPENDENT
ADLS_ACUITY_SCORE: 0
DRESS: STREET CLOTHES
ADLS_ACUITY_SCORE: 0
ORAL_HYGIENE: INDEPENDENT
HYGIENE/GROOMING: INDEPENDENT
ORAL_HYGIENE: INDEPENDENT;PROMPTS
ADLS_ACUITY_SCORE: 0
DRESS: STREET CLOTHES;INDEPENDENT
ADLS_ACUITY_SCORE: 0

## 2024-11-15 NOTE — PLAN OF CARE
BEH IP Unit Acuity Rating Score (UARS)  Patient is given one point for every criteria they meet.    CRITERIA SCORING   On a 72 hour hold, court hold, committed, stay of commitment, or revocation. 1    Patient LOS on BEH unit exceeds 20 days. 1  LOS: 34   Patient under guardianship, 55+, otherwise medically complex, or under age 11. 1   Suicide ideation without relief of precipitating factors. 0   Current plan for suicide. 0   Current plan for homicide. 0   Imminent risk or actual attempt to seriously harm another without relief of factors precipitating the attempt. 1   Severe dysfunction in daily living (ex: complete neglect for self care, extreme disruption in vegetative function, extreme deterioration in social interactions). 1   Recent (last 7 days) or current physical aggression in the ED or on unit. 0   Restraints or seclusion episode in past 72 hours. 0   Recent (last 7 days) or current verbal aggression, agitation, yelling, etc., while in the ED or unit. 0   Active psychosis. 1   Need for constant or near constant redirection (from leaving, from others, etc).  0   Intrusive or disruptive behaviors. 1   Patient requires 3 or more hours of individualized nursing care per 8-hour shift (i.e. for ADLs, meds, therapeutic interventions). 1   TOTAL 8

## 2024-11-15 NOTE — PROGRESS NOTES
Rehab Group     Start time: 1115  End time: 1445  Patient time total: 45 minutes     attended partial groups      #3 attended   Group Type: music   Group Topic Covered: coping skills, emotional regulation, mindfulness, and problem solving      Group Session Details:      1) relaxation  2) HUMS (Healthy-Unhealthy Music Assessment)  3)Music Preferences and History Interviews/Topic Discussion       Patient Response/Contribution:  positive affect, cooperative with task, organized, attentive, and actively engaged      Patient Detail:     Pt calmly identified healthy and unhealthy ways they use music in their personal life and discussed with the group.     Also engaged and cooperative in group music interviews re: formative music in their lives.     Appropriately social with peers, brightened with interventions, appeared comfortable sharing, though seemed to process information more slowly at times.           Activity Therapy Per 15 min ()    Patient Active Problem List   Diagnosis    Hyperlipidemia LDL goal <130    Hypertension goal BP (blood pressure) < 130/80    Hypercalcemia    Hyperparathyroidism (H)    Thalassemia, unspecified type    Psychosis, unspecified psychosis type (H)    Acute psychosis (H)

## 2024-11-15 NOTE — PROGRESS NOTES
"  ----------------------------------------------------------------------------------------------------------  [facility]  Psychiatry Progress Note  Hospital Day #34     Interim History:     The patient's care was discussed with the treatment team and chart notes were reviewed.    Vitals: Had lower blood pressure 91/69   Sleep: 4.75 hours (11/15/24 0600)  Scheduled medications: Did not take evening dose of clonidine 0.1 mg tablet or metoprolol succinate ER 24 hr 50 mg tablet (held), or scheduled melatonin 3 mg tablet   Psychiatric PRN medications: Gabapentin 100 mg capsule x1 (anxiety)     Staff Report:   - Intermittently visible in the milieu   - Participated in group   - Reported to be disorganized and made delusional statements (wanted the car keys to go visit his ex-wife)   - Patient reported anxiety and was given PRN gabapentin     Please see staff notes for details.      Subjective:     Patient Interview:  Estevan Aaron is interviewed in his room. He reports he is fantastic. Reports his mood is pretty good. Shows his brain picture (printed image from MRI) and is fascinated by it. Says \"This is the most fascinating picture I have ever seen.\"     Denies chest pain and abdominal pain. Denies having anxiety last night and denied taking any PRN medication for anxiety (He reported anxiety and took gabapentin per chart review). Shares with the team that he feels safe despite the incident with the new patient earlier today. The new patient was holding Santos's hand.     Tells the team that the [place] building [place] is [place]. Oriented to building, city, and time. Tells the team that he is in a hospital in [place]. Tells the team that he has not seen them lately.     ROS:  Negative except for above.      Objective:     Vitals:  BP 91/69   Pulse 56   Temp 97.7  F (36.5  C)   Resp 16   Wt 105.5 kg (232 lb 8 oz)   SpO2 92%   BMI 37.53 kg/m  "     Allergies:  No Known Allergies    Current Medications:  Scheduled:  Current Facility-Administered Medications   Medication Dose Route Frequency Provider Last Rate Last Admin    acetaminophen (TYLENOL) tablet 650 mg  650 mg Oral Q4H PRN Nikki Caceres MD   650 mg at 11/14/24 0613    alum & mag hydroxide-simethicone (MAALOX) suspension 30 mL  30 mL Oral Q4H PRN Nikki Caceres MD   30 mL at 10/17/24 0837    amLODIPine (NORVASC) tablet 10 mg  10 mg Oral Daily Presley Barrera MD   10 mg at 11/14/24 0852    atorvastatin (LIPITOR) tablet 40 mg  40 mg Oral Daily Nikki Caceres MD   40 mg at 11/14/24 0853    cholecalciferol (VITAMIN D3) capsule 1,250 mcg  1,250 mcg Oral Q7 Days Evelia Grimm DO   1,250 mcg at 11/12/24 1153    cinacalcet (SENSIPAR) tablet 30 mg  30 mg Oral Daily Presley Barrera MD   30 mg at 11/14/24 0852    cloNIDine (CATAPRES) tablet 0.1 mg  0.1 mg Oral BID Presley Barrera MD   0.1 mg at 11/14/24 0853    furosemide (LASIX) tablet 40 mg  40 mg Oral Daily Presley Barrera MD   40 mg at 11/14/24 0852    gabapentin (NEURONTIN) capsule 100 mg  100 mg Oral Q6H PRN Nikki Caceres MD   100 mg at 11/15/24 0418    hydrocortisone (CORTAID) 0.5 % cream   Topical Daily PRN Savi Chamorro MD        lisinopril (ZESTRIL) tablet 40 mg  40 mg Oral Daily Presley Barrera MD   40 mg at 11/14/24 0852    melatonin tablet 3 mg  3 mg Oral At Bedtime Presley Barrera MD   3 mg at 11/13/24 2006    metoprolol succinate ER (TOPROL XL) 24 hr tablet 50 mg  50 mg Oral Daily Presley Barrera MD   50 mg at 11/13/24 0826    nicotine (NICODERM CQ) 14 MG/24HR 24 hr patch 1 patch  1 patch Transdermal Daily Evelia Grimm DO   1 patch at 11/14/24 0855    nicotine (NICODERM CQ) 21 MG/24HR 24 hr patch 1 patch  1 patch Transdermal Daily PRN Britt Kang MD   1 patch at 10/13/24 1611    nicotine (NICORETTE) gum 2 mg  2 mg Buccal Q1H PRN González Garcia MD   2 mg at 10/24/24 1254    OLANZapine (zyPREXA) tablet 5 mg  5  mg Oral TID PRN Evelia Grimm DO   5 mg at 11/12/24 0249    Or    OLANZapine (zyPREXA) injection 5 mg  5 mg Intramuscular TID PRN Evelia Grimm DO        OLANZapine zydis (zyPREXA) ODT half-tab 2.5 mg  2.5 mg Oral QAM Presley Barrera MD   2.5 mg at 11/14/24 1442    OLANZapine zydis (zyPREXA) ODT tab 5 mg  5 mg Oral At Bedtime González Garcia MD   5 mg at 11/14/24 2304    polyethylene glycol (MIRALAX) Packet 17 g  17 g Oral Daily PRN Nikki Caceres MD        traZODone (DESYREL) half-tab 25 mg  25 mg Oral At Bedtime PRN Evelia Grimm DO   25 mg at 11/12/24 0249    Or    traZODone (DESYREL) tablet 50 mg  50 mg Oral At Bedtime PRN Evelia Grimm DO           PRN:  Current Facility-Administered Medications   Medication Dose Route Frequency Provider Last Rate Last Admin    acetaminophen (TYLENOL) tablet 650 mg  650 mg Oral Q4H PRN Nikki Caceres MD   650 mg at 11/14/24 0613    alum & mag hydroxide-simethicone (MAALOX) suspension 30 mL  30 mL Oral Q4H PRN Nikki Caceres MD   30 mL at 10/17/24 0837    amLODIPine (NORVASC) tablet 10 mg  10 mg Oral Daily Presley Barrera MD   10 mg at 11/14/24 0852    atorvastatin (LIPITOR) tablet 40 mg  40 mg Oral Daily Nikki Caceres MD   40 mg at 11/14/24 0853    cholecalciferol (VITAMIN D3) capsule 1,250 mcg  1,250 mcg Oral Q7 Days Evelia Grimm DO   1,250 mcg at 11/12/24 1153    cinacalcet (SENSIPAR) tablet 30 mg  30 mg Oral Daily Presley Barrera MD   30 mg at 11/14/24 0852    cloNIDine (CATAPRES) tablet 0.1 mg  0.1 mg Oral BID Presley Barrera MD   0.1 mg at 11/14/24 0853    furosemide (LASIX) tablet 40 mg  40 mg Oral Daily Presley Barrera MD   40 mg at 11/14/24 0852    gabapentin (NEURONTIN) capsule 100 mg  100 mg Oral Q6H PRN Nikki Caceres MD   100 mg at 11/15/24 0418    hydrocortisone (CORTAID) 0.5 % cream   Topical Daily PRN Savi Chamorro MD        lisinopril (ZESTRIL) tablet 40 mg  40 mg Oral Daily Presley Barrera MD   40 mg at 11/14/24 0852    melatonin  tablet 3 mg  3 mg Oral At Bedtime Presley Barrera MD   3 mg at 11/13/24 2006    metoprolol succinate ER (TOPROL XL) 24 hr tablet 50 mg  50 mg Oral Daily Presley Barrera MD   50 mg at 11/13/24 0826    nicotine (NICODERM CQ) 14 MG/24HR 24 hr patch 1 patch  1 patch Transdermal Daily Evelia Grimm DO   1 patch at 11/14/24 0855    nicotine (NICODERM CQ) 21 MG/24HR 24 hr patch 1 patch  1 patch Transdermal Daily PRN Britt Kang MD   1 patch at 10/13/24 1611    nicotine (NICORETTE) gum 2 mg  2 mg Buccal Q1H PRN González Garcia MD   2 mg at 10/24/24 1254    OLANZapine (zyPREXA) tablet 5 mg  5 mg Oral TID PRN Evelia Grimm DO   5 mg at 11/12/24 0249    Or    OLANZapine (zyPREXA) injection 5 mg  5 mg Intramuscular TID PRN Evelia Grimm DO        OLANZapine zydis (zyPREXA) ODT half-tab 2.5 mg  2.5 mg Oral QAM Presley Barrera MD   2.5 mg at 11/14/24 1442    OLANZapine zydis (zyPREXA) ODT tab 5 mg  5 mg Oral At Bedtime González Garcia MD   5 mg at 11/14/24 2304    polyethylene glycol (MIRALAX) Packet 17 g  17 g Oral Daily PRN Nikki Caceres MD        traZODone (DESYREL) half-tab 25 mg  25 mg Oral At Bedtime PRN Evelia Grimm DO   25 mg at 11/12/24 0249    Or    traZODone (DESYREL) tablet 50 mg  50 mg Oral At Bedtime PRN Evelia Grimm DO           Labs and Imaging:    Data this admission:  - CBC unremarkable  - CMP unremarkable  - TSH normal  - UDS negative  - Vit D low  - Hgb A1c 5.9 (10/13/24)  - Lipids unremarkable  - Vit B12 normal  - Folate normal  - Urinalysis unremarkable  - EKG normal sinus rhythm, QTc 390 ms  - Head CT showed no acute changes  - HIV non reactive  - Treponema antibody non reactive  - ESR wnl   - Ceruloplasmin wnl   - BOOGIE negative  - Lyme negative      Parathyroid hormone:   10/13: 85     Calcium:  10/14: 11.4  10/16: 10.3  10/17: 10.3  10/19: 9.8  10/21: 10.6  10/23: 10.3     Albumin:  10/14: 4.3  10/16: 4.3  10/17: 4.1  10/19: 4.2  10/21: 4.5  10/23: 4.3      CXR:   Impression:   1. No definite  "radiographic evidence of asbestosis. If clinically  indicated, consider evaluation with high resolution chest CT.  2. Nonspecific left costophrenic blunting. Given symmetrically low  lung volumes may be related to poor inspiratory effort/timing.  3. Pulmonary vascular congestion.     MRI:   IMPRESSION:  1. No acute intracranial pathology.  2. No focal lesion or structural abnormality.   3. Symmetric frontoparietal cortical volume loss slightly more than  expected for age.     Mental Status Exam:     Oriented to:  Grossly Oriented; oriented to building, city, and date  General:  Awake and Confused  Appearance:  appears stated age, Grooming is adequate, and Dressed in hospital scrubs and his own tshirt  Behavior/Attitude:  Calm, Cooperative, and Engaged  Eye Contact: Appropriate  Psychomotor: No evidence of tics, dystonia, or tardive dyskinesia  no catatonia present  Speech:  appropriate volume/tone  Language: Fluent in English with appropriate syntax and vocabulary.  Mood:  \"Pretty good\"  Affect:  congruent with mood  Thought Process:  looseness of association, disorganized, and confused   Thought Content:   Did not assess SI/HI/AH/VH; No apparent delusions  Associations:  loose  Insight:  impaired   Judgment:  partial   Impulse control: limited  Attention Span:  decreased  Concentration:  distractible  Recent and Remote Memory:  recent memory impaired, remote memory intact   Fund of Knowledge: average  Muscle Strength and Tone: normal  Gait and Station: Normal     Psychiatric Assessment     Estevan Aaron is a 64 year old male with previous psychiatric diagnoses of GEORGINA admitted from the ER on 10/12/2024 due to concern for HI and psychosis in the context of medical issues (hyperthyroidism, hypercalcemia), psychosocial stressors including with recent divorce and selling his home. This is the patient's first psychiatric hospitalization. Significant symptoms on admission included delusions of persecution with grandiose " beliefs, homicidal ideations, as well as disorganized thinking and behavior. The MSE on admission was pertinent for a thought process which was perseverative, circumstantial, tangential, disorganized and tangential; with rambling and looseness of associations. Psychological contributions to presentation included lack of insight. Social factors contributing to presentation included isolation, recent divorce, selling his house, and moving from hotel to hotel. Biological contributions to presentation included a history of hyperparathyroidism with chronic hypercalcemia per charts as well as a history of methamphetamine use per collateral from ex-wife.     History was difficult to obtain due to the patient's severe disorganized thinking on interview; he was observed with persecutory delusions pertaining to the realtor and gang members, disorganized behavior as these delusions have led him to flee to different hotels to stay safe/ has called  complaining of being targeted and auditory hallucinations of voices for the past 12 months. Per collateral from ex-wife, Santos has had paranoid ideations since they first met. He has always felt like people are out to get him or trying to rip him off. She says that he has had visual hallucinations (he will point things out that the rest of the family can't see) for a long time, but the family would just go along with him to avoid making him angry. This timeline and presentation could be consistent with diagnosis of paranoid personality disorder. Things began to worsen around the beginning of the COVID pandemic, when Santos became more isolated and the family started to notice cognitive issues such as impaired memory. Immediately prior to this hospitalization, the ex-wife found Santos in a hotel room with a generator full of gasoline, binoculars, and hunting equipment. This time course does not suggest acute psychosis, however given the patient has never had a full psychiatric work-up, we  completed a first-episode psychosis work-up.      Other things that could be contributing to his presentation is hyperparathyroidism with hypercalcemia. However, patient's calcium returned to normal limits on 10/16, and patient continues to have psychosis so likely not a significant contributing factor to patient's presentation.      Patient also continues to be disoriented and his behaviors appear to worsen in the evenings. This raised concern for underlying neurocognitive disorder with delirium. Patient received MRI brain with and without contrast which showed frontal and parietal atrophy greater than would be expected for patient's age. This is consistent with neurocognitive disorder. Patient has continued to improve with decreasing his antipsychotic. Working to get patient transferred to geriatric psychiatry in the short term and to memory care long term.      Given that he currently has psychosis, patient warrants inpatient psychiatric hospitalization to maintain his safety.     Psychiatric Plan by Diagnosis      Today's changes:  - None     # Major Neurocognitive Disorder  # Rule out paranoid personality disorder  1. Medications:  - Discontinued PTA fluoxetine 40 mg, consider restarting at a later time   - Olanzapine 2.5 mg during afternoon and 5 mg at bedtime  - Neurology consult 11/8/24, recommend outpatient follow-up and neuropsychiatric testing     2. Pertinent Labs/Monitoring:   - See above     3. Additional Plans:  - Patient will be treated in therapeutic milieu with appropriate individual and group therapies as described     # Unspecified anxiety vs Generalized Anxiety Disorder  - Monitor for symptoms.  - Fluoxetine held due to suspicion of ongoing manic symptoms     Psychiatric Hospital Course:      Estevan Aaron was admitted to [facility] on a 72 hour hold.   Medications:  PTA fluoxetine was held due to concern for worsening of zelalem   New medications started at the time of admission include Zyprexa.    Increased olanzapine 10 mg at bedtime was to 10 mg BID (10/14)   Increased olanzapine 10 mg BID to 10 mg during day and 15 mg at bedtime (10/15)  10/21: increased olanzapine pm dose from 15 to 20 mg  10/23: started melatonin 3 mg at 7 pm to help patient with circadian rhythm as he has been staying up throughout the night and sleeping a lot during the day and there is some concern for delirium   10/24: consulted anesthesia for MRI brain   10/28: MRI brain complete, decrease AM olanzapine from 10 to 5 mg  10/29: Morning olanzapine decreased to 2.5 mg, evening olanzapine decreased to 15 mg   11/1: Decreased evening olanzapine to 10 mg   11/4: Decreased evening olanzapine to 7.5 mg   11/5: Decreased evening olanzapine to 5 mg   11/11: Moved morning dose of olanzapine to the afternoon as patient appears more agitated in the afternoons/evenings     Care conference 10/18/24 with daughter Maria C and ex-wife Clifford  - Report that patient has always been paranoid since ex-wife first met him. She describes him always feeling like he is getting ripped off.   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 They reported once incidence in which he made a bid on a job and the client paid him, but he never ended up finishing the job  - Wife and him  officially this year, and since selling the house several months ago, patient has been moving from hotel to hotel due to thinking people are out to get him   - When they were selling their house, patient threatened realtors and felt he was being ripped off    - Clifford reports that she started to be afraid to be around him alone  - When he was found in the hotel, he had a generator full of gasoline, binoculars, bullets, and hunting equipment.     10/21/24: updated daughter on Santos's treatment plan      10/23/24: updated daughter on Santos's treatment plan      10/25/24: updated daughter on Santos's treatment plan, including plan to try and get MRI brain done under sedation. She said that Santos's grandfather had dementia and his sister has a brain tumor.      10/28/24: updated daughter on Santos's MRI results. She is planning on coming into town soon.      Care conference 11/1/24 with daughters Maria C and Estefani and ex-wife Clifford  - Discussed dementia diagnosis   - Clifford asked about plans moving forward. Explained that applying for medical assistance is the first step. Explained that application processing time can be very variable. Informed family that Santos will most likely be staying on this inpatient unit during this time.   - Clifford expressed that their family would like to be the power of /have conservatorship for Santos.   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 Reports that he has bills that he has not paid.   - Maria C is planning to move to [place], and Clifford currently lives in [place]. Family prefer that Santos would in a facility that are near these locations. Discussed with family that they can also try to request a specific location (memory care).   - Clifford reports that he had previously gotten help applying for his social security and medicare, but unsure if it had been approved.   - Informed family that there is a geriatric unit and there might be a transfer if there becomes availability on this unit.   - Family shared that he was completely different just two months ago.   - Estefani shared that his family found his medications in his old truck recently, expressed that it was likely that he  was not taking his medications prior to his hospitalization.      11/6/24: updated Maria C on plan to try and transfer Satnos to Geriatric unit.      11/11/24: updated Maria C on neurology consult      The risks, benefits, alternatives, and side effects were discussed and understood by the patient and other caregivers.     Medical Assessment and Plan     Medical diagnoses to be addressed this admission:    # Hypertension  - Continue PTA medications  Furosemide 40 mg daily  Lisinopril 40 mg daily  Metoprolol 50 mg daily  Amlodipine 10 mg daily  Clonidine 0.1 mg BID     # Hyperlipidemia  - Continue PTA Atorvastatin 40 mg     # Hyperparathyroidism  # Hypercalcemia, hypophosphoremia   Increased Ca level to 10.9 and decreased Ph level to 2.3 in the ED. Suspicion patient's hypercalcemia could be contributing to symptoms of psychosis.  - Consulted endocrinology, who started cinacalcet 30 mg BID on 10/13  - 10/16 endocrinology recommended continuing to trend calcium and albumin to make sure patient does not become hypocalcemic and recommended holding cinacalcet   - 10/17, calcium and albumin wnl, endo recommended cinacalcet 30 mg once daily   - 10/21, per endo continue cincaclcet and recheck calcium and albumin on October 24  - 10/23: per endo, patient needs to follow up outpatient for further management of hyperparathyroidism      Medical course: Patient was physically examined by the ED prior to being transferred to the unit and was found to be medically stable and appropriate for admission.      Consults: Psychiatry, Endocrinology (follow-up of hyperthyroidism / hypercalcemia and hypertension), neurology     Checklist     Legal Status: Committed with [facility] (Haldol, Clozaril, Risperdal, Invega, Zyprexa, Seroquel, Abilify)     Safety Assessment:   Behavioral Orders   Procedures    Assault precautions    Code 1 - Restrict to Unit    Routine Programming     As clinically indicated    Status 15     Every 15 minutes.    Status  Individual Observation     1:1 during evening shift, & night shift.    If patient refuses overnight presence of staff in his room, it's ok to do 15 min checks through the window blinds.    Patient SIO status reviewed with team/RN.  Please also refer to RN/team documentation for add'l detail.    -SIO staff to monitor following which have contributed to patient being on SIO:  Pt has psychosis regarding being followed and attacked, very paranoid, HI    -Possible interventions SIO staff could use to support patient's treatment progress:  Redirect and reassure  -When following observed, team will review discontinuation of SIO:  Psychosis improves     Order Specific Question:   CONTINUOUS 24 hours / day     Answer:   Other     Order Specific Question:   Specify distance     Answer:   10 feet, 5 feet when close to the doors     Order Specific Question:   Indications for SIO     Answer:   Assault risk     Order Specific Question:   Indications for SIO     Answer:   Severe intrusiveness       Risk Assessment:  Risk for harm is elevated.  Risk factors: impulsive and past behaviors  Protective factors: family      Disposition: Pending stabilization, medication optimization, & development of a safe discharge plan.      Attestations     The patient was seen and discussed with my resident and attending physician.   Qi Jean, MS3  [facility] Medical Student    Resident/Fellow Attestation   I, Savi Chamorro MD, was present with the medical/ALLEN student who participated in the service and in the documentation of the note.  I have verified the history and personally performed the physical exam and medical decision making.  I agree with the assessment and plan of care as documented in the note.        Savi Chamorro MD  PGY1  Date of Service (when I saw the patient): 11/15/24

## 2024-11-15 NOTE — PLAN OF CARE
Goal Outcome Evaluation:    Plan of Care Reviewed With: patient Plan of Care Reviewed With: patient    Overall Patient Progress: improvingOverall Patient Progress: improving       Problem: Adult Behavioral Health Plan of Care  Goal: Plan of Care Review  Outcome: Progressing  Flowsheets  Taken 11/15/2024 1219  Plan of Care Reviewed With: patient  Overall Patient Progress: improving  Patient Agreement with Plan of Care: agrees  Taken 11/15/2024 1122  Patient Agreement with Plan of Care: agrees     Pt was presented with calm mood and brighter affect. Pt was up and visible in the milieu intermittently. Pt minimally socializing with peers. Pt was less disorganized with less delusional statement. Pt denies SI/HI/SIB, hallucinations, anxiety and depression. Pt denies pain. Pt was on assault precautions. Safety was maintained throughout the shift. Pt is medication complaint.    Blood pressure (!) 148/76, pulse 67, temperature 98.2  F (36.8  C), temperature source Oral, resp. rate 16, weight 105.5 kg (232 lb 8 oz), SpO2 96%.

## 2024-11-15 NOTE — PLAN OF CARE
Team Note Due:  Monday    Assessment/Intervention/Current Symtoms and Care Coordination:  Chart review and met with team, discussed pt progress, symptomology, and response to treatment.  Discussed the discharge plan and any potential impediments to discharge.    Updated Clifford on exploring special needs trust with their  to see if this is something pt would qualify for. Advised he may need to privately pay for memory care until he qualifies for MA + waiver in the future. Clifford shared the petition for emergency guardianship/conservatorship was filed and there is a hearing scheduled next week.    Left  for [facility] [address] requesting call back to discuss referral process, availability, and funding options.     Discharge Plan or Goal:  Memory care facility     Barriers to Discharge:  Patient requires further psychiatric stabilization due to current symptomology, medication management with changes subject to provider, coordination with outside supports, and aftercare planning. Pt is under civil commitment.     Referral Status:  None at this time    Family would like to consider the following Memory Care Facilities:  [facility] ([facility] ([place])  [facility] ([place])     Legal Status:  MI Commitment with [facility]  File Number: [id number]  Start and expiration date of commitment: 10/24/24 - 04/24/25    [facility] meds: Haldol, Clozaril, Risperdal, Invega, Zyprexa, Seroquel, Abilify    PPS/CM:  Shelby Chowdary: [phone]  [email or web address].    Contacts:  Maria C Aaron (Daughter): [phone]   Clifford Aaron (ex-wife): [phone]     No Del Angel (guardianship/conservatorship ): [phone]  [email or web address]     Upcoming Meetings and Dates/Important Information and next steps:  Follow up with family regarding emergency guardianship/conservatorship and list of Memory Care  facilities  Discharge planning when appropriate  Pt does not qualify for MA per financial counselor on 11/8/2024. Pt will likely privately pay for Memory Care until he qualifies for MA/waivers in the future.  Pt will need to apply for MA before a M[employer] Assessment/SMRT can be completed for waivers.    Provisional Discharge and Change of Status needed at discharge

## 2024-11-15 NOTE — TELEPHONE ENCOUNTER
R: Bed search ([facility] transfer - 3B) @ 11:51PM:     [facility]: No appropriate bed available on 3B     Pt remains on the work list pending appropriate bed availability.

## 2024-11-15 NOTE — TELEPHONE ENCOUNTER
R:  Pt awaiting bed availability for transfer to unit 3B.  No available beds.      Pt to remain on worklist

## 2024-11-15 NOTE — PLAN OF CARE
Problem: Sleep Disturbance  Goal: Adequate Sleep/Rest  Outcome: Progressing     Pt appears to have slept for 4.75 hours. PRN Gabapentin was given for anxiety. PRN medication was effective, as there was no further complain of anxiety, and pt was able to sleep after medication administration. Pt denies  pain, depression, and SI/SIB. Pt is on 1-1 SIO for assault risk and severe intrusiveness . 15 minutes safety checks were in place. Staff will continue to offer support to pt.

## 2024-11-15 NOTE — PLAN OF CARE
Group Attendance:  attended full group    Time session began: 2000  Time session ended: 2033  Patient's total time in group: 33    Total # Attendees   6   Group Type psychotherapeutic     Group Topic Covered relationships and healthy coping skills     Group Session Detail Group members participated in a discussion centered on loneliness and relationships. Coping methods for loneliness and challenging relationship dynamics were discussed.      Patient's response to the group topic/interactions:  socially appropriate, listened actively, expressed understanding of topic, attentive, and actively engaged     Patient Details: Santos shared feelings of loneliness after  from his wife this August. He was supportive of peers as they shared their lived experiences with loneliness.          [id number] - Group psychotherapy - 1 Session  Patient Active Problem List   Diagnosis    Hyperlipidemia LDL goal <130    Hypertension goal BP (blood pressure) < 130/80    Hypercalcemia    Hyperparathyroidism (H)    Thalassemia, unspecified type    Psychosis, unspecified psychosis type (H)    Acute psychosis (H)        -Known diffuse bilateral lower extremity atherosclerotic changes, right ISMAEL 1 01 and left ISMAEL noncompressible   -Slow gait with walker  -No claudication symptoms  -Prior non invasive testing 2018  -Recommended lower extremity arterial study now   -Continue antiplatelet and statin therapy, on for stroke prevention  -Follow up after duplex to review

## 2024-11-16 ENCOUNTER — TELEPHONE (OUTPATIENT)
Dept: BEHAVIORAL HEALTH | Facility: CLINIC | Age: 64
End: 2024-11-16
Payer: COMMERCIAL

## 2024-11-16 VITALS
DIASTOLIC BLOOD PRESSURE: 70 MMHG | TEMPERATURE: 97.9 F | WEIGHT: 232.5 LBS | HEART RATE: 59 BPM | OXYGEN SATURATION: 95 % | SYSTOLIC BLOOD PRESSURE: 117 MMHG | BODY MASS INDEX: 37.53 KG/M2 | RESPIRATION RATE: 16 BRPM

## 2024-11-16 PROCEDURE — 250N000013 HC RX MED GY IP 250 OP 250 PS 637

## 2024-11-16 PROCEDURE — 124N000002 HC R&B MH UMMC

## 2024-11-16 RX ADMIN — Medication 1 PATCH: at 08:17

## 2024-11-16 RX ADMIN — CINACALCET 30 MG: 30 TABLET ORAL at 08:18

## 2024-11-16 RX ADMIN — Medication 3 MG: at 19:48

## 2024-11-16 RX ADMIN — LISINOPRIL 40 MG: 10 TABLET ORAL at 08:19

## 2024-11-16 RX ADMIN — METOPROLOL SUCCINATE 50 MG: 50 TABLET, EXTENDED RELEASE ORAL at 08:20

## 2024-11-16 RX ADMIN — OLANZAPINE 5 MG: 5 TABLET, ORALLY DISINTEGRATING ORAL at 19:48

## 2024-11-16 RX ADMIN — FUROSEMIDE 40 MG: 40 TABLET ORAL at 08:18

## 2024-11-16 RX ADMIN — CLONIDINE HYDROCHLORIDE 0.1 MG: 0.1 TABLET ORAL at 08:19

## 2024-11-16 RX ADMIN — ATORVASTATIN CALCIUM 40 MG: 20 TABLET, FILM COATED ORAL at 08:20

## 2024-11-16 RX ADMIN — AMLODIPINE BESYLATE 10 MG: 5 TABLET ORAL at 08:18

## 2024-11-16 RX ADMIN — CLONIDINE HYDROCHLORIDE 0.1 MG: 0.1 TABLET ORAL at 19:48

## 2024-11-16 ASSESSMENT — ACTIVITIES OF DAILY LIVING (ADL)
ADLS_ACUITY_SCORE: 0
ORAL_HYGIENE: INDEPENDENT
ADLS_ACUITY_SCORE: 0
DRESS: STREET CLOTHES;INDEPENDENT
ADLS_ACUITY_SCORE: 0
HYGIENE/GROOMING: INDEPENDENT
ADLS_ACUITY_SCORE: 0
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
## 2024-11-16 NOTE — TELEPHONE ENCOUNTER
R: [place]  Access Inpatient Bed Call Log 11/16/24 @4:00 PM::    Intake has called facilities that have not updated the bed status within the last 12 hours.                                 TRANSFER TO  / NO APPROPRIATE BED AVAILABLE     [facility] is posting 0 beds.                 Pt remains on the work list pending appropriate bed availability.

## 2024-11-16 NOTE — TELEPHONE ENCOUNTER
R: Bed search ([facility] transfer - 3B) @ 11:54PM:     [facility]: No appropriate bed available on 3B     Pt remains on the work list pending appropriate bed availability.

## 2024-11-16 NOTE — PLAN OF CARE
Problem: Sleep Disturbance  Goal: Adequate Sleep/Rest  Outcome: Progressing    Pt appears to have slept for 5.75 hours.  Pt denies  pain, and no PRN medications were given. Denies anxiety,depression, and SI/SIB. Pt is on 1-1 SIO for assault risk and severe intrusiveness . 15 minutes safety checks were in place. Staff will continue to offer support to pt.

## 2024-11-16 NOTE — TELEPHONE ENCOUNTER
R: [place]  Access Inpatient Bed Call Log 11/15/24 @ 4:45PM    Intake has called facilities that have not updated the bed status within the last 12 hours.                               TRANSFER TO 3B / NO APPROPRIATE BED D/T NEEDING AN O     [facility] is at capacity.                Pt remains on the work list pending appropriate bed availability.

## 2024-11-16 NOTE — TELEPHONE ENCOUNTER
R: [place]  Access Inpatient Bed Call Log 11/16/24 @ 7:06 am:    Intake has called facilities that have not updated the bed status within the last 12 hours.                                 [facility] is posting 0 beds.                  Pt remains on the work list pending appropriate bed availability.

## 2024-11-16 NOTE — PLAN OF CARE
The writer met with the patient to assess overall health, mental status, and physical well-being. Hygiene and food intake were observed to be adequate. The patient was seen talking on the phone during the assessment, suggesting some level of social interaction. The patient continues to be on an SIO for intrusive behaviors. He denied pain and discomfort, was observed watching TV, keeps to self, calm cooperative and polite on interaction.   Problem: Adult Behavioral Health Plan of Care  Goal: Adheres to Safety Considerations for Self and Others  Outcome: Progressing  Flowsheets (Taken 11/15/2024 1843)  Adheres to Safety Considerations for Self and Others: making progress toward outcome     Problem: Psychotic Signs/Symptoms  Goal: Improved Mood Symptoms (Psychotic Signs/Symptoms)  Intervention: Optimize Emotion and Mood  Recent Flowsheet Documentation  Taken 11/15/2024 1800 by Alice Sethi, RN  Diversional Activity: television   Goal Outcome Evaluation:    Plan of Care Reviewed With: patient

## 2024-11-17 ENCOUNTER — TELEPHONE (OUTPATIENT)
Dept: BEHAVIORAL HEALTH | Facility: CLINIC | Age: 64
End: 2024-11-17
Payer: COMMERCIAL

## 2024-11-17 PROCEDURE — 250N000013 HC RX MED GY IP 250 OP 250 PS 637

## 2024-11-17 PROCEDURE — 124N000002 HC R&B MH UMMC

## 2024-11-17 RX ADMIN — Medication 1 PATCH: at 07:50

## 2024-11-17 RX ADMIN — CLONIDINE HYDROCHLORIDE 0.1 MG: 0.1 TABLET ORAL at 07:48

## 2024-11-17 RX ADMIN — CINACALCET 30 MG: 30 TABLET ORAL at 07:49

## 2024-11-17 RX ADMIN — FUROSEMIDE 40 MG: 40 TABLET ORAL at 07:48

## 2024-11-17 RX ADMIN — OLANZAPINE 2.5 MG: 5 TABLET, ORALLY DISINTEGRATING ORAL at 14:41

## 2024-11-17 RX ADMIN — LISINOPRIL 40 MG: 10 TABLET ORAL at 07:48

## 2024-11-17 RX ADMIN — CLONIDINE HYDROCHLORIDE 0.1 MG: 0.1 TABLET ORAL at 20:54

## 2024-11-17 RX ADMIN — Medication 3 MG: at 20:54

## 2024-11-17 RX ADMIN — AMLODIPINE BESYLATE 10 MG: 5 TABLET ORAL at 07:48

## 2024-11-17 RX ADMIN — OLANZAPINE 5 MG: 5 TABLET, ORALLY DISINTEGRATING ORAL at 20:53

## 2024-11-17 RX ADMIN — ATORVASTATIN CALCIUM 40 MG: 20 TABLET, FILM COATED ORAL at 07:48

## 2024-11-17 RX ADMIN — Medication 25 MG: at 01:48

## 2024-11-17 ASSESSMENT — ACTIVITIES OF DAILY LIVING (ADL)
ADLS_ACUITY_SCORE: 0
LAUNDRY: WITH SUPERVISION
ADLS_ACUITY_SCORE: 0
HYGIENE/GROOMING: HANDWASHING;SHOWER;INDEPENDENT
ADLS_ACUITY_SCORE: 0
ORAL_HYGIENE: INDEPENDENT
ADLS_ACUITY_SCORE: 0
DRESS: STREET CLOTHES;INDEPENDENT
ADLS_ACUITY_SCORE: 0
ADLS_ACUITY_SCORE: 0
DRESS: STREET CLOTHES;INDEPENDENT
ADLS_ACUITY_SCORE: 0
ADLS_ACUITY_SCORE: 0
HYGIENE/GROOMING: INDEPENDENT
ADLS_ACUITY_SCORE: 0
ORAL_HYGIENE: INDEPENDENT;PROMPTS
ADLS_ACUITY_SCORE: 0

## 2024-11-17 NOTE — TELEPHONE ENCOUNTER
R:  Pt is on the Adult worklist @ 7:30am awaiting review and availability for a bed on 3B.  Currently no beds available on unit 3B for pt to be reviewed for a transfer to.      Pt will remain on the adult worklist pending appropriate 3B bed to be reviewed for.

## 2024-11-17 NOTE — TELEPHONE ENCOUNTER
R: Bed search ([facility] transfer - 3B) @ 11:27PM:     [facility]: No appropriate bed available on 3B     Pt remains on the work list pending appropriate bed availability.

## 2024-11-17 NOTE — PLAN OF CARE
Patient was out visible watching tv with peers, appropriately social and engaging however continues to present with confusion, he denied pain and discomfort, unable to assess patient's emotional and mental state as he struggles to focus and concentrate with confused state of mind.   Problem: Adult Behavioral Health Plan of Care  Goal: Adheres to Safety Considerations for Self and Others  Outcome: Progressing  Flowsheets (Taken 11/16/2024 2008)  Adheres to Safety Considerations for Self and Others: making progress toward outcome  Intervention: Develop and Maintain Individualized Safety Plan  Recent Flowsheet Documentation  Taken 11/16/2024 2000 by Alice Sethi RN  Safety Measures:   environmental rounds completed   safety rounds completed     Problem: Psychotic Signs/Symptoms  Goal: Optimal Cognitive Function (Psychotic Signs/Symptoms)  Outcome: Progressing  Flowsheets (Taken 11/16/2024 2008)  Mutually Determined Action Steps (Optimal Cognitive Function): follows step-by-step instructions   Goal Outcome Evaluation:    Plan of Care Reviewed With: patient

## 2024-11-17 NOTE — PLAN OF CARE
"Goal Outcome Evaluation:    Plan of Care Reviewed With: patient Plan of Care Reviewed With: patient    Overall Patient Progress: improvingOverall Patient Progress: improving       Problem: Adult Behavioral Health Plan of Care  Goal: Plan of Care Review  Outcome: Progressing  Flowsheets  Taken 11/17/2024 0928  Plan of Care Reviewed With: patient  Overall Patient Progress: improving  Patient Agreement with Plan of Care: agrees  Taken 11/17/2024 0858  Patient Agreement with Plan of Care: agrees     Pt was presented with disorganized behavior. Pt was observed packing his belongings in the paper bag, stated that \"I am going home.\" Pt was argumentative with TV remote while the group was going on. Pt remain visible in the milieu intermittently. Pt minimally socializing with peers. Pt denies SI/HI/SIB, hallucinations, anxiety and depression. Pt denies pain. Pt was on assault precautions. Safety was maintained throughout the shift. Pt is medication complaint.     Blood pressure 118/76, pulse 50, temperature 98  F (36.7  C), temperature source Oral, resp. rate 16, weight 105.5 kg (232 lb 8 oz), SpO2 97%.    "

## 2024-11-17 NOTE — PLAN OF CARE
Problem: Sleep Disturbance  Goal: Adequate Sleep/Rest  Outcome: Progressing    Pt appears to have slept for 4 hours. PRN Trazodone  was given to promote sleep. PRN medication was effective, as  pt was able to sleep after medication administration. Pt denies  pain, depression, and SI/SIB. Pt is on 1-1 SIO for assault risk and severe intrusiveness for evening and night shift. 15 minutes safety checks were in place. Staff will continue to offer support to pt.

## 2024-11-18 ENCOUNTER — TELEPHONE (OUTPATIENT)
Dept: BEHAVIORAL HEALTH | Facility: CLINIC | Age: 64
End: 2024-11-18
Payer: COMMERCIAL

## 2024-11-18 PROCEDURE — 250N000013 HC RX MED GY IP 250 OP 250 PS 637

## 2024-11-18 PROCEDURE — 124N000002 HC R&B MH UMMC

## 2024-11-18 PROCEDURE — 99232 SBSQ HOSP IP/OBS MODERATE 35: CPT | Mod: GC | Performed by: PSYCHIATRY & NEUROLOGY

## 2024-11-18 RX ADMIN — CLONIDINE HYDROCHLORIDE 0.1 MG: 0.1 TABLET ORAL at 07:53

## 2024-11-18 RX ADMIN — AMLODIPINE BESYLATE 10 MG: 5 TABLET ORAL at 07:53

## 2024-11-18 RX ADMIN — OLANZAPINE 2.5 MG: 5 TABLET, ORALLY DISINTEGRATING ORAL at 15:14

## 2024-11-18 RX ADMIN — OLANZAPINE 5 MG: 5 TABLET, FILM COATED ORAL at 04:34

## 2024-11-18 RX ADMIN — CINACALCET 30 MG: 30 TABLET ORAL at 07:53

## 2024-11-18 RX ADMIN — CLONIDINE HYDROCHLORIDE 0.1 MG: 0.1 TABLET ORAL at 20:36

## 2024-11-18 RX ADMIN — ATORVASTATIN CALCIUM 40 MG: 20 TABLET, FILM COATED ORAL at 07:53

## 2024-11-18 RX ADMIN — LISINOPRIL 40 MG: 10 TABLET ORAL at 07:53

## 2024-11-18 RX ADMIN — Medication 1 PATCH: at 08:05

## 2024-11-18 RX ADMIN — Medication 25 MG: at 04:34

## 2024-11-18 RX ADMIN — FUROSEMIDE 40 MG: 40 TABLET ORAL at 07:53

## 2024-11-18 RX ADMIN — Medication 3 MG: at 20:36

## 2024-11-18 RX ADMIN — OLANZAPINE 5 MG: 5 TABLET, ORALLY DISINTEGRATING ORAL at 20:35

## 2024-11-18 ASSESSMENT — ACTIVITIES OF DAILY LIVING (ADL)
ADLS_ACUITY_SCORE: 0
ADLS_ACUITY_SCORE: 0
DRESS: STREET CLOTHES;INDEPENDENT
ORAL_HYGIENE: INDEPENDENT;PROMPTS
ADLS_ACUITY_SCORE: 0
ADLS_ACUITY_SCORE: 0
LAUNDRY: WITH SUPERVISION
ADLS_ACUITY_SCORE: 0
HYGIENE/GROOMING: INDEPENDENT
ADLS_ACUITY_SCORE: 0
DRESS: STREET CLOTHES;INDEPENDENT
ADLS_ACUITY_SCORE: 0
ORAL_HYGIENE: INDEPENDENT;PROMPTS
ADLS_ACUITY_SCORE: 0
ADLS_ACUITY_SCORE: 0
HYGIENE/GROOMING: HANDWASHING;SHOWER;INDEPENDENT
ADLS_ACUITY_SCORE: 0
LAUNDRY: WITH SUPERVISION
ADLS_ACUITY_SCORE: 0
ADLS_ACUITY_SCORE: 0

## 2024-11-18 NOTE — TELEPHONE ENCOUNTER
R: Bed search ([facility] transfer - 3B) @ 11:00 AM:     [facility]: No appropriate bed available on 3B     Pt remains on the work list pending appropriate bed availability.
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
## 2024-11-18 NOTE — PLAN OF CARE
BEH IP Unit Acuity Rating Score (UARS)  Patient is given one point for every criteria they meet.    CRITERIA SCORING   On a 72 hour hold, court hold, committed, stay of commitment, or revocation. 1    Patient LOS on BEH unit exceeds 20 days. 1  LOS: 37   Patient under guardianship, 55+, otherwise medically complex, or under age 11. 1   Suicide ideation without relief of precipitating factors. 0   Current plan for suicide. 0   Current plan for homicide. 0   Imminent risk or actual attempt to seriously harm another without relief of factors precipitating the attempt. 1   Severe dysfunction in daily living (ex: complete neglect for self care, extreme disruption in vegetative function, extreme deterioration in social interactions). 1   Recent (last 7 days) or current physical aggression in the ED or on unit. 0   Restraints or seclusion episode in past 72 hours. 0   Recent (last 7 days) or current verbal aggression, agitation, yelling, etc., while in the ED or unit. 0   Active psychosis. 1   Need for constant or near constant redirection (from leaving, from others, etc).  0   Intrusive or disruptive behaviors. 1   Patient requires 3 or more hours of individualized nursing care per 8-hour shift (i.e. for ADLs, meds, therapeutic interventions). 1   TOTAL 8

## 2024-11-18 NOTE — PLAN OF CARE
Team Note Due:  Monday    Assessment/Intervention/Current Symtoms and Care Coordination:  Chart review and met with team, discussed pt progress, symptomology, and response to treatment.  Discussed the discharge plan and any potential impediments to discharge.    Writer spoke with Fanta at [facility]. They currently have a studio apartment open and accept private pay for 2 years with potential to accept MA afterwards. Writer indicated I would pass this information along to family to follow up. Update sent to Joleen.     Left vms for [facility].    Summary of updates sent to Maria C Horton, Patel, and Shelby/PPS.    Discharge Plan or Goal:  Memory care facility     Barriers to Discharge:  Patient requires further psychiatric stabilization due to current symptomology, medication management with changes subject to provider, coordination with outside supports, and aftercare planning. Pt is under civil commitment.     Referral Status:  None at this time    Family would like to consider the following Memory Care Facilities:  [facility] ([place])  [facility] ([place])  [facility] ([place]) - has studio apt available as of 11/18     Legal Status:  MI Commitment with [facility]  File Number: [id number]  Start and expiration date of commitment: 10/24/24 - 04/24/25    [facility] meds: Haldol, Clozaril, Risperdal, Invega, Zyprexa, Seroquel, Abilify    PPS/CM:  Shelby Chowdary: [phone]  [email or web address].    Contacts:  Maria C Aaron (Daughter): [phone]   Cliffordjackeline Aaron (ex-wife): [phone]     No Del Angel (guardianship/conservatorship ): [phone]  [email or web address]     Upcoming Meetings and Dates/Important Information and next steps:  Follow up with family regarding emergency guardianship/conservatorship and list of Memory Care facilities  Discharge planning  when appropriate  Pt does not qualify for MA per financial counselor on 11/8/2024. Pt will likely privately pay for Memory Care until he qualifies for MA/waivers in the future.  Pt will need to apply for MA before a MNChoices Assessment/SMRT can be completed for waivers.    Provisional Discharge and Change of Status needed at discharge

## 2024-11-18 NOTE — PROGRESS NOTES
"  ----------------------------------------------------------------------------------------------------------  [facility]  Psychiatry Progress Note  Hospital Day #37     Interim History:     The patient's care was discussed with the treatment team and chart notes were reviewed.    Vitals: Low heart rates between 50-55 when patient awake   Sleep: 4.25 hours (11/18/24 0600)  Scheduled medications: Took all scheduled medications as prescribed except the following below  11/16/24:  - Did not take olanzapine 2.5 mg half-tablet   11/17/24:  - Did not take metoprolol succinate ER 24hr 50 mg tablet  Psychiatric PRN medications:   11/17/24:  - Trazodone 25 mg half-tablet x1   11/18/24:  - Olanzapine 5 mg tablet x1  - Trazodone 25 mg half-tablet x1    Staff Report:   - Attended group (music therapy)   - Slept between 4 to 5.75 hours this weekend   - No reports of behavioral outbursts of attempts of elopement  - Continued to have disorganized behaviors (thought other patients would go into his room and take all of his belongings, packed his belongings in a paper bag and told staff that he was going home, argumentative with TV remote)      Please see staff notes for details.      Subjective:     Patient Interview:  Estevan Aaron is interviewed in his room. He reports his mood is good today. Reports that he watched the [employer] game and that the [place] will be going to the super bowl. Tells he team that they played against \"[employer]ers or something. I can't remember.\" Reports that it was windy last night. Tells the team that he had trouble sleeping. Denied taking any PRN medications and was unsure if they were effective. Denies pain.     Oriented to building and date. Told the team that we are in a hospital and the date is November 18th. Mentioned that we are in a building where surgeries are performed. The team informed that he would not be having any surgeries performed. He was also " "informed of his guardianship hearing this week. Patient got agitated after hearing this update. He raised his voice and said \"My ex-wife is the one behind this. She is trying to take all my money.\" The interview ended when the patient told the team to leave his room.     ROS:  Negative except for above.      Objective:     Vitals:  /73 (BP Location: Left arm, Patient Position: Sitting, Cuff Size: Adult Large)   Pulse 55   Temp 97.6  F (36.4  C) (Oral)   Resp 16   Wt 105.5 kg (232 lb 8 oz)   SpO2 93%   BMI 37.53 kg/m      Allergies:  No Known Allergies    Current Medications:  Scheduled:  Current Facility-Administered Medications   Medication Dose Route Frequency Provider Last Rate Last Admin    acetaminophen (TYLENOL) tablet 650 mg  650 mg Oral Q4H PRN Nikki Caceres MD   650 mg at 11/14/24 0613    alum & mag hydroxide-simethicone (MAALOX) suspension 30 mL  30 mL Oral Q4H PRN Nikki Caceres MD   30 mL at 10/17/24 0837    amLODIPine (NORVASC) tablet 10 mg  10 mg Oral Daily Presley Barrera MD   10 mg at 11/17/24 0748    atorvastatin (LIPITOR) tablet 40 mg  40 mg Oral Daily Nikki Caceres MD   40 mg at 11/17/24 0748    cholecalciferol (VITAMIN D3) capsule 1,250 mcg  1,250 mcg Oral Q7 Days Sirisha EveliaDO   1,250 mcg at 11/12/24 1153    cinacalcet (SENSIPAR) tablet 30 mg  30 mg Oral Daily Presley Barrera MD   30 mg at 11/17/24 0749    cloNIDine (CATAPRES) tablet 0.1 mg  0.1 mg Oral BID Presley Barrera MD   0.1 mg at 11/17/24 2054    furosemide (LASIX) tablet 40 mg  40 mg Oral Daily Presley Barrera MD   40 mg at 11/17/24 0748    gabapentin (NEURONTIN) capsule 100 mg  100 mg Oral Q6H PRN Nikki Caceres MD   100 mg at 11/15/24 0418    hydrocortisone (CORTAID) 0.5 % cream   Topical Daily PRN Savi Chamorro MD        lisinopril (ZESTRIL) tablet 40 mg  40 mg Oral Daily Presley Barrera MD   40 mg at 11/17/24 0748    melatonin tablet 3 mg  3 mg Oral At Bedtime Presley Barrera MD   3 mg at " 11/17/24 2054    metoprolol succinate ER (TOPROL XL) 24 hr tablet 50 mg  50 mg Oral Daily Presley Barrera MD   50 mg at 11/16/24 0820    nicotine (NICODERM CQ) 14 MG/24HR 24 hr patch 1 patch  1 patch Transdermal Daily Evelia Grimm DO   1 patch at 11/17/24 0750    nicotine (NICODERM CQ) 21 MG/24HR 24 hr patch 1 patch  1 patch Transdermal Daily PRN Britt Kang MD   1 patch at 10/13/24 1611    nicotine (NICORETTE) gum 2 mg  2 mg Buccal Q1H PRN González Garcia MD   2 mg at 10/24/24 1254    OLANZapine (zyPREXA) tablet 5 mg  5 mg Oral TID PRN Evelia Grimm DO   5 mg at 11/18/24 0434    Or    OLANZapine (zyPREXA) injection 5 mg  5 mg Intramuscular TID PRN Evelia Grimm DO        OLANZapine zydis (zyPREXA) ODT half-tab 2.5 mg  2.5 mg Oral QAM Presley Barrera MD   2.5 mg at 11/17/24 1441    OLANZapine zydis (zyPREXA) ODT tab 5 mg  5 mg Oral At Bedtime González Garcia MD   5 mg at 11/17/24 2053    polyethylene glycol (MIRALAX) Packet 17 g  17 g Oral Daily PRN Nikki Caceres MD        traZODone (DESYREL) half-tab 25 mg  25 mg Oral At Bedtime PRN Evelia Grimm DO   25 mg at 11/18/24 0434    Or    traZODone (DESYREL) tablet 50 mg  50 mg Oral At Bedtime PRN Evelia Grimm DO           PRN:  Current Facility-Administered Medications   Medication Dose Route Frequency Provider Last Rate Last Admin    acetaminophen (TYLENOL) tablet 650 mg  650 mg Oral Q4H PRN Nikki Caceres MD   650 mg at 11/14/24 0613    alum & mag hydroxide-simethicone (MAALOX) suspension 30 mL  30 mL Oral Q4H PRN Nikki Caceres MD   30 mL at 10/17/24 0837    amLODIPine (NORVASC) tablet 10 mg  10 mg Oral Daily Presley Barrera MD   10 mg at 11/17/24 0748    atorvastatin (LIPITOR) tablet 40 mg  40 mg Oral Daily Nikki Caceres MD   40 mg at 11/17/24 0748    cholecalciferol (VITAMIN D3) capsule 1,250 mcg  1,250 mcg Oral Q7 Days Evelia Grimm DO   1,250 mcg at 11/12/24 1153    cinacalcet (SENSIPAR) tablet 30 mg  30 mg Oral Daily Presley Barrera MD    30 mg at 11/17/24 0749    cloNIDine (CATAPRES) tablet 0.1 mg  0.1 mg Oral BID Presley Barrera MD   0.1 mg at 11/17/24 2054    furosemide (LASIX) tablet 40 mg  40 mg Oral Daily Presley Barrera MD   40 mg at 11/17/24 0748    gabapentin (NEURONTIN) capsule 100 mg  100 mg Oral Q6H PRN Nikki Cacerse MD   100 mg at 11/15/24 0418    hydrocortisone (CORTAID) 0.5 % cream   Topical Daily PRN Savi Chamorro MD        lisinopril (ZESTRIL) tablet 40 mg  40 mg Oral Daily Presley Barrera MD   40 mg at 11/17/24 0748    melatonin tablet 3 mg  3 mg Oral At Bedtime Presley Barrera MD   3 mg at 11/17/24 2054    metoprolol succinate ER (TOPROL XL) 24 hr tablet 50 mg  50 mg Oral Daily Presley Barrera MD   50 mg at 11/16/24 0820    nicotine (NICODERM CQ) 14 MG/24HR 24 hr patch 1 patch  1 patch Transdermal Daily Evelia Grimm DO   1 patch at 11/17/24 0750    nicotine (NICODERM CQ) 21 MG/24HR 24 hr patch 1 patch  1 patch Transdermal Daily PRN Britt Kang MD   1 patch at 10/13/24 1611    nicotine (NICORETTE) gum 2 mg  2 mg Buccal Q1H PRN González Garcia MD   2 mg at 10/24/24 1254    OLANZapine (zyPREXA) tablet 5 mg  5 mg Oral TID PRN Evelia Grimm DO   5 mg at 11/18/24 0434    Or    OLANZapine (zyPREXA) injection 5 mg  5 mg Intramuscular TID PRN Evelia Grimm DO        OLANZapine zydis (zyPREXA) ODT half-tab 2.5 mg  2.5 mg Oral QAM Presley Barrera MD   2.5 mg at 11/17/24 1441    OLANZapine zydis (zyPREXA) ODT tab 5 mg  5 mg Oral At Bedtime González Garcia MD   5 mg at 11/17/24 2053    polyethylene glycol (MIRALAX) Packet 17 g  17 g Oral Daily PRN Nikki Caceres MD        traZODone (DESYREL) half-tab 25 mg  25 mg Oral At Bedtime PRN Evelia Grimm DO   25 mg at 11/18/24 0434    Or    traZODone (DESYREL) tablet 50 mg  50 mg Oral At Bedtime PRN Evelia Grimm DO           Labs and Imaging:  Data this admission:  - CBC unremarkable  - CMP unremarkable  - TSH normal  - UDS negative  - Vit D low  - Hgb A1c 5.9 (10/13/24)  -  "Lipids unremarkable  - Vit B12 normal  - Folate normal  - Urinalysis unremarkable  - EKG normal sinus rhythm, QTc 390 ms  - Head CT showed no acute changes  - HIV non reactive  - Treponema antibody non reactive  - ESR wnl   - Ceruloplasmin wnl   - BOOGIE negative  - Lyme negative      Parathyroid hormone:   10/13: 85     Calcium:  10/14: 11.4  10/16: 10.3  10/17: 10.3  10/19: 9.8  10/21: 10.6  10/23: 10.3     Albumin:  10/14: 4.3  10/16: 4.3  10/17: 4.1  10/19: 4.2  10/21: 4.5  10/23: 4.3      CXR:   Impression:   1. No definite radiographic evidence of asbestosis. If clinically  indicated, consider evaluation with high resolution chest CT.  2. Nonspecific left costophrenic blunting. Given symmetrically low  lung volumes may be related to poor inspiratory effort/timing.  3. Pulmonary vascular congestion.     MRI:   IMPRESSION:  1. No acute intracranial pathology.  2. No focal lesion or structural abnormality.   3. Symmetric frontoparietal cortical volume loss slightly more than  expected for age.     Mental Status Exam:     Oriented to:  Oriented to building and time.   General:  Awake and Confused  Appearance:  appears stated age, Grooming is adequate, and Dressed in his own clothes  Behavior/Attitude:  Engaged, Accusatory, and Guarded  Eye Contact: Appropriate  Psychomotor: No evidence of tics, dystonia, or tardive dyskinesia  no catatonia present  Speech:  loud volume/tone and paucity with trying to find words   Language: Fluent in English with appropriate syntax and vocabulary.  Mood:  \"Good\"  Affect:  congruent with mood and irritable  Thought Process:  disorganized and confused  Thought Content:   Did not assess SI/HI/AH/VH; delusions of confusion   Associations:  loose  Insight:  limited   Judgment:  limited   Impulse control: limited  Attention Span:  decreased   Concentration:  distractible   Recent and Remote Memory:  recent memory impaired, remote memory intact   Fund of Knowledge: average  Muscle Strength " and Tone: normal   Gait and Station: normal      Psychiatric Assessment     Estevan Aaron is a 64 year old male with previous psychiatric diagnoses of GEORGINA admitted from the ER on 10/12/2024 due to concern for HI and psychosis in the context of medical issues (hyperthyroidism, hypercalcemia), psychosocial stressors including with recent divorce and selling his home. This is the patient's first psychiatric hospitalization. Significant symptoms on admission included delusions of persecution with grandiose beliefs, homicidal ideations, as well as disorganized thinking and behavior. The MSE on admission was pertinent for a thought process which was perseverative, circumstantial, tangential, disorganized and tangential; with rambling and looseness of associations. Psychological contributions to presentation included lack of insight. Social factors contributing to presentation included isolation, recent divorce, selling his house, and moving from hotel to hotel. Biological contributions to presentation included a history of hyperparathyroidism with chronic hypercalcemia per charts as well as a history of methamphetamine use per collateral from ex-wife.     History was difficult to obtain due to the patient's severe disorganized thinking on interview; he was observed with persecutory delusions pertaining to the realtor and gang members, disorganized behavior as these delusions have led him to flee to different hotels to stay safe/ has called  complaining of being targeted and auditory hallucinations of voices for the past 12 months. Per collateral from ex-wife, Santos has had paranoid ideations since they first met. He has always felt like people are out to get him or trying to rip him off. She says that he has had visual hallucinations (he will point things out that the rest of the family can't see) for a long time, but the family would just go along with him to avoid making him angry. This timeline and presentation could  be consistent with diagnosis of paranoid personality disorder. Things began to worsen around the beginning of the COVID pandemic, when Santos became more isolated and the family started to notice cognitive issues such as impaired memory. Immediately prior to this hospitalization, the ex-wife found Santos in a hotel room with a generator full of gasoline, binoculars, and hunting equipment. This time course does not suggest acute psychosis, however given the patient has never had a full psychiatric work-up, we completed a first-episode psychosis work-up.      Other things that could be contributing to his presentation is hyperparathyroidism with hypercalcemia. However, patient's calcium returned to normal limits on 10/16, and patient continues to have psychosis so likely not a significant contributing factor to patient's presentation.      Patient also continues to be disoriented and his behaviors appear to worsen in the evenings. This raised concern for underlying neurocognitive disorder with delirium. Patient received MRI brain with and without contrast which showed frontal and parietal atrophy greater than would be expected for patient's age. This is consistent with neurocognitive disorder. Patient has continued to improve with decreasing his antipsychotic. Working to get patient transferred to geriatric psychiatry in the short term and to memory care long term.      Given that he currently has psychosis, patient warrants inpatient psychiatric hospitalization to maintain his safety.     Psychiatric Plan by Diagnosis      Today's changes:  - None     # Major Neurocognitive Disorder  # Rule out paranoid personality disorder  1. Medications:  - Discontinued PTA fluoxetine 40 mg, consider restarting at a later time   - Olanzapine 2.5 mg during afternoon and 5 mg at bedtime  - Neurology consult 11/8/24, recommend outpatient follow-up and neuropsychiatric testing     2. Pertinent Labs/Monitoring:   - See above     3.  Additional Plans:  - Patient will be treated in therapeutic milieu with appropriate individual and group therapies as described     # Unspecified anxiety vs Generalized Anxiety Disorder  - Monitor for symptoms.  - Fluoxetine held due to suspicion of ongoing manic symptoms     Psychiatric Hospital Course:      Estevan Aaron was admitted to [facility] on a 72 hour hold.   Medications:  PTA fluoxetine was held due to concern for worsening of zelalem   New medications started at the time of admission include Zyprexa.   Increased olanzapine 10 mg at bedtime was to 10 mg BID (10/14)   Increased olanzapine 10 mg BID to 10 mg during day and 15 mg at bedtime (10/15)  10/21: increased olanzapine pm dose from 15 to 20 mg  10/23: started melatonin 3 mg at 7 pm to help patient with circadian rhythm as he has been staying up throughout the night and sleeping a lot during the day and there is some concern for delirium   10/24: consulted anesthesia for MRI brain   10/28: MRI brain complete, decrease AM olanzapine from 10 to 5 mg  10/29: Morning olanzapine decreased to 2.5 mg, evening olanzapine decreased to 15 mg   11/1: Decreased evening olanzapine to 10 mg   11/4: Decreased evening olanzapine to 7.5 mg   11/5: Decreased evening olanzapine to 5 mg   11/11: Moved morning dose of olanzapine to the afternoon as patient appears more agitated in the afternoons/evenings     Care conference 10/18/24 with daughter Maria C and ex-wife Clifford  - Report that patient has always been paranoid since ex-wife first met him. She describes him always feeling like he is getting ripped off.   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 They reported once incidence in which he made a bid on a job and the client paid him, but he never ended up finishing the job  - Wife and him  officially this year, and since selling the house several months ago, patient has been moving from hotel to hotel due to thinking people are out to get him   - When they were selling their house, patient threatened realtors and felt he was being ripped off   - Clifford reports that she started to be afraid to be around him alone  - When he was found in the hotel, he had a generator full of gasoline, binoculars, bullets, and hunting equipment.     10/21/24: updated daughter on Santos's treatment plan      10/23/24: updated daughter on Santos's treatment plan      10/25/24: updated daughter on Santos's treatment plan, including plan to try and get MRI brain done under sedation. She said that Santos's grandfather had dementia and his sister has a brain tumor.      10/28/24: updated daughter on Santos's MRI results. She is planning on coming into town soon.      Care conference 11/1/24 with daughters Maria C and Estefani and ex-wife Clifford  - Discussed dementia diagnosis   - Clifford asked about plans moving forward. Explained that applying for medical assistance is the first step. Explained that application processing time can be very variable. Informed family that Santos will most likely be staying on this inpatient unit during this time.   - Clifford expressed that their family would like to be the power of /have conservatorship for Santos.   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 Reports that he has bills that he has not paid.   - Maria C is planning to move to [place], and Clifford currently lives in [place]. Family prefer that Santos would in a facility that are near these locations. Discussed with family that  they can also try to request a specific location (memory care).   - Clifford reports that he had previously gotten help applying for his social security and medicare, but unsure if it had been approved.   - Informed family that there is a geriatric unit and there might be a transfer if there becomes availability on this unit.   - Family shared that he was completely different just two months ago.   - Estefani shared that his family found his medications in his old truck recently, expressed that it was likely that he was not taking his medications prior to his hospitalization.      11/6/24: updated Maria C on plan to try and transfer Santos to Geriatric unit.      11/11/24: updated Maria C on neurology consult      The risks, benefits, alternatives, and side effects were discussed and understood by the patient and other caregivers.     Medical Assessment and Plan     Medical diagnoses to be addressed this admission:    # Hypertension  - Continue PTA medications  Furosemide 40 mg daily  Lisinopril 40 mg daily  Metoprolol 50 mg daily  Amlodipine 10 mg daily  Clonidine 0.1 mg BID     # Hyperlipidemia  - Continue PTA Atorvastatin 40 mg     # Hyperparathyroidism  # Hypercalcemia, hypophosphoremia   Increased Ca level to 10.9 and decreased Ph level to 2.3 in the ED. Suspicion patient's hypercalcemia could be contributing to symptoms of psychosis.  - Consulted endocrinology, who started cinacalcet 30 mg BID on 10/13  - 10/16 endocrinology recommended continuing to trend calcium and albumin to make sure patient does not become hypocalcemic and recommended holding cinacalcet   - 10/17, calcium and albumin wnl, endo recommended cinacalcet 30 mg once daily   - 10/21, per endo continue cincaclcet and recheck calcium and albumin on October 24  - 10/23: per endo, patient needs to follow up outpatient for further management of hyperparathyroidism      Medical course: Patient was physically examined by the ED prior to being transferred to  the unit and was found to be medically stable and appropriate for admission.      Consults: Psychiatry, Endocrinology (follow-up of hyperthyroidism / hypercalcemia and hypertension), neurology     Checklist     Legal Status: Committed with [facility] (Haldol, Clozaril, Risperdal, Invega, Zyprexa, Seroquel, Abilify)     Safety Assessment:   Behavioral Orders   Procedures    Assault precautions    Code 1 - Restrict to Unit    Routine Programming     As clinically indicated    Status 15     Every 15 minutes.    Status Individual Observation     1:1 during evening shift, & night shift.    If patient refuses overnight presence of staff in his room, it's ok to do 15 min checks through the window blinds.    Patient SIO status reviewed with team/RN.  Please also refer to RN/team documentation for add'l detail.    -SIO staff to monitor following which have contributed to patient being on SIO:  Pt has psychosis regarding being followed and attacked, very paranoid, HI    -Possible interventions SIO staff could use to support patient's treatment progress:  Redirect and reassure  -When following observed, team will review discontinuation of SIO:  Psychosis improves     Order Specific Question:   CONTINUOUS 24 hours / day     Answer:   Other     Order Specific Question:   Specify distance     Answer:   10 feet, 5 feet when close to the doors     Order Specific Question:   Indications for SIO     Answer:   Assault risk     Order Specific Question:   Indications for SIO     Answer:   Severe intrusiveness       Risk Assessment:  Risk for harm is elevated.  Risk factors: impulsive and past behaviors  Protective factors: family      Disposition: Pending stabilization, medication optimization, & development of a safe discharge plan.      Attestations     The patient was seen and discussed with my resident and attending physician.   Qi Jean, MS3  [facility] Medical Student      Resident/Fellow Attestation   I, Savi Chamorro MD, was  present with the medical/ALLEN student who participated in the service and in the documentation of the note.  I have verified the history and personally performed the physical exam and medical decision making.  I agree with the assessment and plan of care as documented in the note.      Savi Chamorro MD  PGY1  Date of Service (when I saw the patient): 11/18/24     Attestation:  This patient has been seen and evaluated by me, Pete Smith MD.  I have discussed this patient with the house staff team including the resident and/or medical student and I agree with the findings and plan in this note.    I have reviewed today's vital signs, medications, labs and imaging. Pete Smith MD , PhD.

## 2024-11-18 NOTE — PLAN OF CARE
Problem: Sleep Disturbance  Goal: Adequate Sleep/Rest  Outcome: Progressing    Pt appears to have slept for 4.25 hours. Pt was agitated and was not compliant with redirection. PRN Zyprexa  and Trazodone  were given with  several encouragement/ redirection. PRN medications were effective, as pt was able to sleep after medication administration. Pt denies  pain, depression, and SI/SIB. Pt is on 1-1 SIO for assault risk and severe intrusiveness for evening and night shift. 15 minutes safety checks were in place. Staff will continue to offer support to pt.

## 2024-11-18 NOTE — PLAN OF CARE
Problem: Psychotic Signs/Symptoms  Goal: Improved Behavioral Control (Psychotic Signs/Symptoms)  Outcome: Progressing     Problem: Anxiety  Goal: Anxiety Reduction or Resolution  Outcome: Progressing   Goal Outcome Evaluation:  Pt was visible in the milieu watching TV with select peers. Pt was observed pacing the [facility] intermittently socializing with staff. Pt describe his mood as calm. Pt denied any physical pain or discomfort.Disoriented to situation. Denies all mental health and psych symptoms.Food and fluid intake adequate. Pt was cooperative and compliant with medication. No safety concerns. No escalation of behavior.

## 2024-11-18 NOTE — TELEPHONE ENCOUNTER
R: 6:30 PM Bed Search Update- No appropriate beds available on 3B.  Pt remains on PPS worklist awaiting appropriate bed.

## 2024-11-18 NOTE — TELEPHONE ENCOUNTER
R: Bed search ([facility] transfer - 3B) @3:15 PM:     [facility]: No appropriate bed available on 3B     Pt remains on the work list pending appropriate bed availability.

## 2024-11-18 NOTE — PLAN OF CARE
Goal Outcome Evaluation:    Plan of Care Reviewed With: patient Plan of Care Reviewed With: patient    Overall Patient Progress: improvingOverall Patient Progress: improving           Problem: Adult Behavioral Health Plan of Care  Goal: Plan of Care Review  Outcome: Progressing  Flowsheets  Taken 11/18/2024 1210  Plan of Care Reviewed With: patient  Overall Patient Progress: improving  Patient Agreement with Plan of Care: agrees  Taken 11/18/2024 1000  Patient Agreement with Plan of Care: agrees    Pt was up and visible in the milieu majority of the time, sitting watching TV with peers. Pt attended medication education group.  Pt appeared less disorganized. Pt minimally socializing with peers. Pt denies SI/HI/SIB, hallucinations, anxiety and depression. Pt denies pain. Pt was on assault precautions. Safety was maintained throughout the shift. Pt is medication complaint. Pt has a new diet order for double portion.    Metoprolol was held due to HR being less than 60.    Blood pressure 130/72, pulse 56, temperature 98.2  F (36.8  C), temperature source Oral, resp. rate 16, weight 105.5 kg (232 lb 8 oz), SpO2 95%.

## 2024-11-18 NOTE — TELEPHONE ENCOUNTER
R: Bed search ([facility] transfer - 3B) @ 11:29PM:     [facility]: No appropriate bed available on 3B     Pt remains on the work list pending appropriate bed availability.

## 2024-11-18 NOTE — PLAN OF CARE
Patient was out visible calm cooperative with care management, he was compliant with medication and vitals, continues to present with confusion and inability to assess his emotional state, denied pain and discomfort, hygiene and intake were adequate, no other behavioral concerns noted.   Problem: Adult Behavioral Health Plan of Care  Goal: Optimized Coping Skills in Response to Life Stressors  Outcome: Progressing  Flowsheets (Taken 11/17/2024 2214)  Optimized Coping Skills in Response to Life Stressors: making progress toward outcome     Problem: Psychotic Signs/Symptoms  Goal: Improved Behavioral Control (Psychotic Signs/Symptoms)  Intervention: Manage Behavior  Recent Flowsheet Documentation  Taken 11/17/2024 2200 by Alice Sethi RN  De-Escalation Techniques: verbally redirected   Goal Outcome Evaluation:    Plan of Care Reviewed With: patient

## 2024-11-19 ENCOUNTER — TELEPHONE (OUTPATIENT)
Dept: BEHAVIORAL HEALTH | Facility: CLINIC | Age: 64
End: 2024-11-19
Payer: COMMERCIAL

## 2024-11-19 PROCEDURE — 250N000013 HC RX MED GY IP 250 OP 250 PS 637

## 2024-11-19 PROCEDURE — 124N000002 HC R&B MH UMMC

## 2024-11-19 PROCEDURE — 97150 GROUP THERAPEUTIC PROCEDURES: CPT | Mod: GO

## 2024-11-19 PROCEDURE — 99231 SBSQ HOSP IP/OBS SF/LOW 25: CPT | Mod: GC | Performed by: PSYCHIATRY & NEUROLOGY

## 2024-11-19 RX ADMIN — CLONIDINE HYDROCHLORIDE 0.1 MG: 0.1 TABLET ORAL at 07:52

## 2024-11-19 RX ADMIN — CINACALCET 30 MG: 30 TABLET ORAL at 07:51

## 2024-11-19 RX ADMIN — OLANZAPINE 5 MG: 5 TABLET, ORALLY DISINTEGRATING ORAL at 19:19

## 2024-11-19 RX ADMIN — FUROSEMIDE 40 MG: 40 TABLET ORAL at 07:52

## 2024-11-19 RX ADMIN — Medication 1 PATCH: at 07:58

## 2024-11-19 RX ADMIN — Medication 1250 MCG: at 12:59

## 2024-11-19 RX ADMIN — METOPROLOL SUCCINATE 50 MG: 50 TABLET, EXTENDED RELEASE ORAL at 07:51

## 2024-11-19 RX ADMIN — CLONIDINE HYDROCHLORIDE 0.1 MG: 0.1 TABLET ORAL at 19:19

## 2024-11-19 RX ADMIN — Medication 3 MG: at 19:19

## 2024-11-19 RX ADMIN — AMLODIPINE BESYLATE 10 MG: 5 TABLET ORAL at 07:51

## 2024-11-19 RX ADMIN — ATORVASTATIN CALCIUM 40 MG: 20 TABLET, FILM COATED ORAL at 07:51

## 2024-11-19 RX ADMIN — LISINOPRIL 40 MG: 10 TABLET ORAL at 07:51

## 2024-11-19 ASSESSMENT — ACTIVITIES OF DAILY LIVING (ADL)
ADLS_ACUITY_SCORE: 0
DRESS: INDEPENDENT;STREET CLOTHES
ADLS_ACUITY_SCORE: 0
LAUNDRY: UNABLE TO COMPLETE
ADLS_ACUITY_SCORE: 0
ORAL_HYGIENE: INDEPENDENT
ADLS_ACUITY_SCORE: 0
ORAL_HYGIENE: INDEPENDENT
ADLS_ACUITY_SCORE: 0
HYGIENE/GROOMING: INDEPENDENT
ADLS_ACUITY_SCORE: 0
LAUNDRY: UNABLE TO COMPLETE
HYGIENE/GROOMING: INDEPENDENT
ADLS_ACUITY_SCORE: 0
ADLS_ACUITY_SCORE: 0
DRESS: INDEPENDENT;STREET CLOTHES

## 2024-11-19 NOTE — TELEPHONE ENCOUNTER
R: [place]  Access Inpatient Bed Call Log 11/19/24 8:20 AM    Intake has called facilities that have not updated the bed status within the last 12 hours.                          Transfer to [address] [place] [address] is at capacity.             Pt remains on the work list pending appropriate bed availability.

## 2024-11-19 NOTE — PLAN OF CARE
BEH IP Unit Acuity Rating Score (UARS)  Patient is given one point for every criteria they meet.    CRITERIA SCORING   On a 72 hour hold, court hold, committed, stay of commitment, or revocation. 1    Patient LOS on BEH unit exceeds 20 days. 1  LOS: 38   Patient under guardianship, 55+, otherwise medically complex, or under age 11. 1   Suicide ideation without relief of precipitating factors. 0   Current plan for suicide. 0   Current plan for homicide. 0   Imminent risk or actual attempt to seriously harm another without relief of factors precipitating the attempt. 1   Severe dysfunction in daily living (ex: complete neglect for self care, extreme disruption in vegetative function, extreme deterioration in social interactions). 1   Recent (last 7 days) or current physical aggression in the ED or on unit. 0   Restraints or seclusion episode in past 72 hours. 0   Recent (last 7 days) or current verbal aggression, agitation, yelling, etc., while in the ED or unit. 0   Active psychosis. 1   Need for constant or near constant redirection (from leaving, from others, etc).  0   Intrusive or disruptive behaviors. 1   Patient requires 3 or more hours of individualized nursing care per 8-hour shift (i.e. for ADLs, meds, therapeutic interventions). 1   TOTAL 8

## 2024-11-19 NOTE — PLAN OF CARE
"  Problem: Psychotic Signs/Symptoms  Goal: Improved Behavioral Control (Psychotic Signs/Symptoms)  Outcome: Progressing  Intervention: Manage Behavior  Recent Flowsheet Documentation  Taken 11/19/2024 1609 by Tamie Fraser RN  De-Escalation Techniques: verbally redirected     Problem: Suicidal Behavior  Goal: Suicidal Behavior is Absent or Managed  Outcome: Progressing  Intervention: Provide Immediate and Ongoing Protective Physical Environment  Recent Flowsheet Documentation  Taken 11/19/2024 1609 by Tamie Fraser RN  Safe Transition Promotion: protective factors promoted     Problem: Depression  Goal: Improved Mood  Outcome: Progressing   Goal Outcome Evaluation:    Pt was visible in the lounge watching TV with select peers. Affect labile. Disoriented to situation.  Stated,\"I am having a bad day everything is going wrong\". Speech tangential. Hygiene is appropriate. Pt took a shower this evening.Pt denies pain or discomfort. Denies all mental health and psych symptoms. Food and fluid intake adequate. Pt was cooperative, compliant with medication and contracted for safety. No behavioral issues this shift. Pt continuous on SIO 1:1  10 ft for severe intrusiveness  "

## 2024-11-19 NOTE — PROGRESS NOTES
"  ----------------------------------------------------------------------------------------------------------  [facility]  Psychiatry Progress Note  Hospital Day #38     Interim History:     The patient's care was discussed with the treatment team and chart notes were reviewed.    Vitals: Had a low HR of 56  Sleep: 6 hours (11/19/24 0659)  Scheduled medications:   - Metoprolol was held due to HR being less than 60 (second day in a row this medication was held)   Psychiatric PRN medications: Olanzapine 5 mg tablet x1 and trazodone 25 mg half-tablet x1     Staff Report:   - Visible in the milieu, watching TV with peers  - Attended medication education group yesterday   - Denies SI/HI/SIB, hallucinations, anxiety, depression, and pain     Please see staff notes for details.    Subjective:     Patient Interview:  Estevan Aaron is interviewed in his room. Patient reminisces about his occupation as a . He reports that his wife did not make any financial contributions to their house payments. He reports being on medicaid right now. Reports his mood is good. Patient talks at length about moving into a different house. Tells the team \"The guys are looking at me for plumbing, the customer is now Estevan. He needs help with snow plowing.\" He reports the customer is on the unit and needs help with snow plowing because he is busy with customers during the winter. Patient reports feeling safe on the unit. Denies any safety concerns.     Patient denies any medication side effects. Denies shortness of breath, dizziness, nausea. Denies pain. Patient says \"I am getting upset now, if I don't get what they took from me, I get high blood pressure\". I don't want to hurt anyone, I love people and people love me. I have many friends, not many people can do what I do.\" He also mentioned, \"I can't thank the team enough for the MRI pictures.\"     ROS:  Negative except for above.      " Objective:     Vitals:  /89   Pulse 68   Temp 98.1  F (36.7  C)   Resp 16   Wt 105.5 kg (232 lb 8 oz)   SpO2 96%   BMI 37.53 kg/m      Allergies:  No Known Allergies    Current Medications:  Scheduled:  Current Facility-Administered Medications   Medication Dose Route Frequency Provider Last Rate Last Admin    acetaminophen (TYLENOL) tablet 650 mg  650 mg Oral Q4H PRN Nikki Caceres MD   650 mg at 11/14/24 0613    alum & mag hydroxide-simethicone (MAALOX) suspension 30 mL  30 mL Oral Q4H PRN Nikki Caceres MD   30 mL at 10/17/24 0837    amLODIPine (NORVASC) tablet 10 mg  10 mg Oral Daily Presley Barrera MD   10 mg at 11/18/24 0753    atorvastatin (LIPITOR) tablet 40 mg  40 mg Oral Daily Nikki Caceres MD   40 mg at 11/18/24 0753    cholecalciferol (VITAMIN D3) capsule 1,250 mcg  1,250 mcg Oral Q7 Days Evelia Grimm DO   1,250 mcg at 11/12/24 1153    cinacalcet (SENSIPAR) tablet 30 mg  30 mg Oral Daily Presley Barrera MD   30 mg at 11/18/24 0753    cloNIDine (CATAPRES) tablet 0.1 mg  0.1 mg Oral BID Presley Barrera MD   0.1 mg at 11/18/24 2036    furosemide (LASIX) tablet 40 mg  40 mg Oral Daily Presley Barrera MD   40 mg at 11/18/24 0753    gabapentin (NEURONTIN) capsule 100 mg  100 mg Oral Q6H PRN Nikki Caceres MD   100 mg at 11/15/24 0418    hydrocortisone (CORTAID) 0.5 % cream   Topical Daily PRN Savi Chamorro MD        lisinopril (ZESTRIL) tablet 40 mg  40 mg Oral Daily Presley Barrera MD   40 mg at 11/18/24 0753    melatonin tablet 3 mg  3 mg Oral At Bedtime Presley Barrera MD   3 mg at 11/18/24 2036    metoprolol succinate ER (TOPROL XL) 24 hr tablet 50 mg  50 mg Oral Daily Presley Barrera MD   50 mg at 11/16/24 0820    nicotine (NICODERM CQ) 14 MG/24HR 24 hr patch 1 patch  1 patch Transdermal Daily Evelia Grimm DO   1 patch at 11/18/24 0805    nicotine (NICODERM CQ) 21 MG/24HR 24 hr patch 1 patch  1 patch Transdermal Daily PRN Britt Kang MD   1 patch at 10/13/24  1611    nicotine (NICORETTE) gum 2 mg  2 mg Buccal Q1H PRN González Garcia MD   2 mg at 10/24/24 1254    OLANZapine (zyPREXA) tablet 5 mg  5 mg Oral TID PRN Evelia Grimm DO   5 mg at 11/18/24 0434    Or    OLANZapine (zyPREXA) injection 5 mg  5 mg Intramuscular TID PRN Evelia Grimm DO        OLANZapine zydis (zyPREXA) ODT half-tab 2.5 mg  2.5 mg Oral QAM Presley Barrera MD   2.5 mg at 11/18/24 1514    OLANZapine zydis (zyPREXA) ODT tab 5 mg  5 mg Oral At Bedtime González Garcia MD   5 mg at 11/18/24 2035    polyethylene glycol (MIRALAX) Packet 17 g  17 g Oral Daily PRN Nikki Caceres MD        traZODone (DESYREL) half-tab 25 mg  25 mg Oral At Bedtime PRN Evelia Grimm DO   25 mg at 11/18/24 0434    Or    traZODone (DESYREL) tablet 50 mg  50 mg Oral At Bedtime PRN Evelia Grimm DO           PRN:  Current Facility-Administered Medications   Medication Dose Route Frequency Provider Last Rate Last Admin    acetaminophen (TYLENOL) tablet 650 mg  650 mg Oral Q4H PRN Nikki Caceres MD   650 mg at 11/14/24 0613    alum & mag hydroxide-simethicone (MAALOX) suspension 30 mL  30 mL Oral Q4H PRN Nikki Caceres MD   30 mL at 10/17/24 0837    amLODIPine (NORVASC) tablet 10 mg  10 mg Oral Daily Presley Barrera MD   10 mg at 11/18/24 0753    atorvastatin (LIPITOR) tablet 40 mg  40 mg Oral Daily Nikki Caceres MD   40 mg at 11/18/24 0753    cholecalciferol (VITAMIN D3) capsule 1,250 mcg  1,250 mcg Oral Q7 Days Evelia Grimm DO   1,250 mcg at 11/12/24 1153    cinacalcet (SENSIPAR) tablet 30 mg  30 mg Oral Daily Presley Barrera MD   30 mg at 11/18/24 0753    cloNIDine (CATAPRES) tablet 0.1 mg  0.1 mg Oral BID Presley Barrera MD   0.1 mg at 11/18/24 2036    furosemide (LASIX) tablet 40 mg  40 mg Oral Daily Presley Barrera MD   40 mg at 11/18/24 0753    gabapentin (NEURONTIN) capsule 100 mg  100 mg Oral Q6H PRN Nikki Caceres MD   100 mg at 11/15/24 0418    hydrocortisone (CORTAID) 0.5 % cream   Topical Daily PRN  Savi Chamorro MD        lisinopril (ZESTRIL) tablet 40 mg  40 mg Oral Daily Presley Barrera MD   40 mg at 11/18/24 0753    melatonin tablet 3 mg  3 mg Oral At Bedtime Presley Barrera MD   3 mg at 11/18/24 2036    metoprolol succinate ER (TOPROL XL) 24 hr tablet 50 mg  50 mg Oral Daily Presley Barrera MD   50 mg at 11/16/24 0820    nicotine (NICODERM CQ) 14 MG/24HR 24 hr patch 1 patch  1 patch Transdermal Daily Evelia Grimm DO   1 patch at 11/18/24 0805    nicotine (NICODERM CQ) 21 MG/24HR 24 hr patch 1 patch  1 patch Transdermal Daily PRN Britt Kang MD   1 patch at 10/13/24 1611    nicotine (NICORETTE) gum 2 mg  2 mg Buccal Q1H PRN González Garcia MD   2 mg at 10/24/24 1254    OLANZapine (zyPREXA) tablet 5 mg  5 mg Oral TID PRN Evelia Grimm DO   5 mg at 11/18/24 0434    Or    OLANZapine (zyPREXA) injection 5 mg  5 mg Intramuscular TID PRN Evelia Grimm DO        OLANZapine zydis (zyPREXA) ODT half-tab 2.5 mg  2.5 mg Oral QAM Presley Barrera MD   2.5 mg at 11/18/24 1514    OLANZapine zydis (zyPREXA) ODT tab 5 mg  5 mg Oral At Bedtime González Garcia MD   5 mg at 11/18/24 2035    polyethylene glycol (MIRALAX) Packet 17 g  17 g Oral Daily PRN Nikki Caceres MD        traZODone (DESYREL) half-tab 25 mg  25 mg Oral At Bedtime PRN Evelia Grimm DO   25 mg at 11/18/24 0434    Or    traZODone (DESYREL) tablet 50 mg  50 mg Oral At Bedtime PRN Evelia Grimm DO           Labs and Imaging:  Data this admission:  - CBC unremarkable  - CMP unremarkable  - TSH normal  - UDS negative  - Vit D low  - Hgb A1c 5.9 (10/13/24)  - Lipids unremarkable  - Vit B12 normal  - Folate normal  - Urinalysis unremarkable  - EKG normal sinus rhythm, QTc 390 ms  - Head CT showed no acute changes  - HIV non reactive  - Treponema antibody non reactive  - ESR wnl   - Ceruloplasmin wnl   - BOOGIE negative  - Lyme negative      Parathyroid hormone:   10/13: 85     Calcium:  10/14: 11.4  10/16: 10.3  10/17: 10.3  10/19: 9.8  10/21:  "10.6  10/23: 10.3     Albumin:  10/14: 4.3  10/16: 4.3  10/17: 4.1  10/19: 4.2  10/21: 4.5  10/23: 4.3      CXR:   Impression:   1. No definite radiographic evidence of asbestosis. If clinically  indicated, consider evaluation with high resolution chest CT.  2. Nonspecific left costophrenic blunting. Given symmetrically low  lung volumes may be related to poor inspiratory effort/timing.  3. Pulmonary vascular congestion.     MRI:   IMPRESSION:  1. No acute intracranial pathology.  2. No focal lesion or structural abnormality.   3. Symmetric frontoparietal cortical volume loss slightly more than  expected for age.     Mental Status Exam:     Oriented to:  Oriented to building and time.   General:  Awake and Confused  Appearance:  appears stated age, Grooming is adequate, and Dressed in his own clothes  Behavior/Attitude:  Engaged and Easily distracted  Eye Contact: Appropriate  Psychomotor: No evidence of tics, dystonia, or tardive dyskinesia  no catatonia present  Speech:  appropriate volume/tone  Language: Fluent in English with appropriate syntax and vocabulary.  Mood:  \"Good\"  Affect:  congruent with mood  Thought Process:  disorganized and confused  Thought Content:   Did not assess SI/HI/AH/VH; delusions of confusion   Associations:  loose   Insight:  limited   Judgment:  limited   Impulse control: limited   Attention Span:  decreased  Concentration:  distractible   Recent and Remote Memory:  recent memory impaired, remote memory intact   Fund of Knowledge: average  Muscle Strength and Tone: normal  Gait and Station: Normal     Psychiatric Assessment     Estevan Aaron is a 64 year old male with previous psychiatric diagnoses of GEORGINA admitted from the ER on 10/12/2024 due to concern for HI and psychosis in the context of medical issues (hyperthyroidism, hypercalcemia), psychosocial stressors including with recent divorce and selling his home. This is the patient's first psychiatric hospitalization. Significant " symptoms on admission included delusions of persecution with grandiose beliefs, homicidal ideations, as well as disorganized thinking and behavior. The MSE on admission was pertinent for a thought process which was perseverative, circumstantial, tangential, disorganized and tangential; with rambling and looseness of associations. Psychological contributions to presentation included lack of insight. Social factors contributing to presentation included isolation, recent divorce, selling his house, and moving from hotel to hotel. Biological contributions to presentation included a history of hyperparathyroidism with chronic hypercalcemia per charts as well as a history of methamphetamine use per collateral from ex-wife.     History was difficult to obtain due to the patient's severe disorganized thinking on interview; he was observed with persecutory delusions pertaining to the realtor and gang members, disorganized behavior as these delusions have led him to flee to different hotels to stay safe/ has called  complaining of being targeted and auditory hallucinations of voices for the past 12 months. Per collateral from ex-wife, Santos has had paranoid ideations since they first met. He has always felt like people are out to get him or trying to rip him off. She says that he has had visual hallucinations (he will point things out that the rest of the family can't see) for a long time, but the family would just go along with him to avoid making him angry. This timeline and presentation could be consistent with diagnosis of paranoid personality disorder. Things began to worsen around the beginning of the COVID pandemic, when Santos became more isolated and the family started to notice cognitive issues such as impaired memory. Immediately prior to this hospitalization, the ex-wife found Santos in a hotel room with a generator full of gasoline, binoculars, and hunting equipment. This time course does not suggest acute psychosis,  however given the patient has never had a full psychiatric work-up, we completed a first-episode psychosis work-up.      Other things that could be contributing to his presentation is hyperparathyroidism with hypercalcemia. However, patient's calcium returned to normal limits on 10/16, and patient continues to have psychosis so likely not a significant contributing factor to patient's presentation.      Patient also continues to be disoriented and his behaviors appear to worsen in the evenings. This raised concern for underlying neurocognitive disorder with delirium. Patient received MRI brain with and without contrast which showed frontal and parietal atrophy greater than would be expected for patient's age. This is consistent with neurocognitive disorder. Patient has continued to improve with decreasing his antipsychotic. Working to get patient transferred to geriatric psychiatry in the short term and to memory care long term.      Given that he currently has psychosis, patient warrants inpatient psychiatric hospitalization to maintain his safety.     Psychiatric Plan by Diagnosis      Today's changes:  - None     # Major Neurocognitive Disorder  # Rule out paranoid personality disorder  1. Medications:  - Discontinued PTA fluoxetine 40 mg, consider restarting at a later time   - Olanzapine 2.5 mg during afternoon and 5 mg at bedtime  - Neurology consult 11/8/24, recommend outpatient follow-up and neuropsychiatric testing     2. Pertinent Labs/Monitoring:   - See above     3. Additional Plans:  - Patient will be treated in therapeutic milieu with appropriate individual and group therapies as described     # Unspecified anxiety vs Generalized Anxiety Disorder  - Monitor for symptoms.  - Fluoxetine held due to suspicion of ongoing manic symptoms     Psychiatric Hospital Course:      Estevan Aaron was admitted to [facility] on a 72 hour hold.   Medications:  PTA fluoxetine was held due to concern for worsening of  zelalem   New medications started at the time of admission include Zyprexa.   Increased olanzapine 10 mg at bedtime was to 10 mg BID (10/14)   Increased olanzapine 10 mg BID to 10 mg during day and 15 mg at bedtime (10/15)  10/21: increased olanzapine pm dose from 15 to 20 mg  10/23: started melatonin 3 mg at 7 pm to help patient with circadian rhythm as he has been staying up throughout the night and sleeping a lot during the day and there is some concern for delirium   10/24: consulted anesthesia for MRI brain   10/28: MRI brain complete, decrease AM olanzapine from 10 to 5 mg  10/29: Morning olanzapine decreased to 2.5 mg, evening olanzapine decreased to 15 mg   11/1: Decreased evening olanzapine to 10 mg   11/4: Decreased evening olanzapine to 7.5 mg   11/5: Decreased evening olanzapine to 5 mg   11/11: Moved morning dose of olanzapine to the afternoon as patient appears more agitated in the afternoons/evenings     Care conference 10/18/24 with daughter Maria C and ex-wife Clifford  - Report that patient has always been paranoid since ex-wife first met him. She describes him always feeling like he is getting ripped off.   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 They reported once incidence in which he made a bid on a job and the client paid him, but he never ended up finishing the job  - Wife and him  officially this year, and since selling the house several months ago, patient has been moving from hotel to hotel due to thinking people are out to get him   - When they were selling  their house, patient threatened realtors and felt he was being ripped off   - Clifford reports that she started to be afraid to be around him alone  - When he was found in the hotel, he had a generator full of gasoline, binoculars, bullets, and hunting equipment.     10/21/24: updated daughter on Santos's treatment plan      10/23/24: updated daughter on Santos's treatment plan      10/25/24: updated daughter on Santos's treatment plan, including plan to try and get MRI brain done under sedation. She said that Santos's grandfather had dementia and his sister has a brain tumor.      10/28/24: updated daughter on Santos's MRI results. She is planning on coming into town soon.      Care conference 11/1/24 with daughters Maria C and Estefani and ex-wife Clifford  - Discussed dementia diagnosis   - Clifford asked about plans moving forward. Explained that applying for medical assistance is the first step. Explained that application processing time can be very variable. Informed family that Santos will most likely be staying on this inpatient unit during this time.   - Clifford expressed that their family would like to be the power of /have conservatorship for Santos.   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 Reports that he has bills that he has not paid.   - Maria C is planning to move to [place], and Clifford currently lives in [place]. Family prefer that Santos would in a facility that are near these locations. Discussed with family that they can also try to request a specific location (memory care).   - Clifford reports that he had previously gotten help applying for his social security and medicare, but unsure if it had been approved.   - Informed family that there is a geriatric unit and there might be a transfer if there becomes availability on this unit.   - Family shared that he was completely different just two months ago.   - Estefani shared that his family found his  medications in his old truck recently, expressed that it was likely that he was not taking his medications prior to his hospitalization.      11/6/24: updated Maria C on plan to try and transfer Santos to Geriatric unit.      11/11/24: updated Maria C on neurology consult      The risks, benefits, alternatives, and side effects were discussed and understood by the patient and other caregivers.     Medical Assessment and Plan     Medical diagnoses to be addressed this admission:    # Hypertension  - Continue PTA medications  Furosemide 40 mg daily  Lisinopril 40 mg daily  Metoprolol 50 mg daily  Amlodipine 10 mg daily  Clonidine 0.1 mg BID     # Hyperlipidemia  - Continue PTA Atorvastatin 40 mg     # Hyperparathyroidism  # Hypercalcemia, hypophosphoremia   Increased Ca level to 10.9 and decreased Ph level to 2.3 in the ED. Suspicion patient's hypercalcemia could be contributing to symptoms of psychosis.  - Consulted endocrinology, who started cinacalcet 30 mg BID on 10/13  - 10/16 endocrinology recommended continuing to trend calcium and albumin to make sure patient does not become hypocalcemic and recommended holding cinacalcet   - 10/17, calcium and albumin wnl, endo recommended cinacalcet 30 mg once daily   - 10/21, per endo continue cincaclcet and recheck calcium and albumin on October 24  - 10/23: per endo, patient needs to follow up outpatient for further management of hyperparathyroidism      Medical course: Patient was physically examined by the ED prior to being transferred to the unit and was found to be medically stable and appropriate for admission.      Consults: Psychiatry, Endocrinology (follow-up of hyperthyroidism / hypercalcemia and hypertension), neurology     Checklist     Legal Status: Committed with [facility] (Haldol, Clozaril, Risperdal, Invega, Zyprexa, Seroquel, Abilify)     Safety Assessment:   Behavioral Orders   Procedures    Assault precautions    Code 1 - Restrict to Unit    Routine Programming      As clinically indicated    Status 15     Every 15 minutes.    Status Individual Observation     1:1 during evening shift, & night shift.    If patient refuses overnight presence of staff in his room, it's ok to do 15 min checks through the window blinds.    Patient SIO status reviewed with team/RN.  Please also refer to RN/team documentation for add'l detail.    -SIO staff to monitor following which have contributed to patient being on SIO:  Pt has psychosis regarding being followed and attacked, very paranoid, HI    -Possible interventions SIO staff could use to support patient's treatment progress:  Redirect and reassure  -When following observed, team will review discontinuation of SIO:  Psychosis improves     Order Specific Question:   CONTINUOUS 24 hours / day     Answer:   Other     Order Specific Question:   Specify distance     Answer:   10 feet, 5 feet when close to the doors     Order Specific Question:   Indications for SIO     Answer:   Assault risk     Order Specific Question:   Indications for SIO     Answer:   Severe intrusiveness     Risk Assessment:  Risk for harm is elevated.  Risk factors: impulsive and past behaviors  Protective factors: family      Disposition: Pending stabilization, medication optimization, & development of a safe discharge plan.      Attestations     The patient was seen and discussed with my resident and attending physician.   Qi Jean, MS3  [facility] Medical Student      Resident/Fellow Attestation   I, Savi Chamorro MD, was present with the medical/ALLEN student who participated in the service and in the documentation of the note.  I have verified the history and personally performed the physical exam and medical decision making.  I agree with the assessment and plan of care as documented in the note.        Savi Chamorro MD  PGY1  Date of Service (when I saw the patient): 11/19/24     Attestation:  This patient has been seen and evaluated by me, Pete Smith MD.   I have discussed this patient with the house staff team including the resident and/or medical student and I agree with the findings and plan in this note.    I have reviewed today's vital signs, medications, labs and imaging. Pete Smith MD , PhD.

## 2024-11-19 NOTE — PLAN OF CARE
Problem: Sleep Disturbance  Goal: Adequate Sleep/Rest  Outcome: Progressing    Pt appears to have slept for 6 hours. Pt denies pain or discomfort, and no PRN medications were given.Pt denies anxiety,  depression, and SI/SIB. Pt is on 1-1 SIO for assault risk and severe intrusiveness for evening and night shift. 15 minutes safety checks were in place. Staff will continue to offer support to pt.

## 2024-11-19 NOTE — PLAN OF CARE
Team Note Due:  Monday    Assessment/Intervention/Current Symtoms and Care Coordination:  Chart review and met with team, discussed pt progress, symptomology, and response to treatment.  Discussed the discharge plan and any potential impediments to discharge.    Received call from [facility]. Gathered general information to pass along to family - currently have a few units available. 2 years of private pay required before they may accept EW (have limited availability for EW).     Sent update to pt's family regarding [facility] and their contact information.    Discharge Plan or Goal:  Memory care facility     Barriers to Discharge:  Patient requires further psychiatric stabilization due to current symptomology, medication management with changes subject to provider, coordination with outside supports, and aftercare planning. Pt is under civil commitment.     Referral Status:  None at this time    Family would like to consider the following Memory Care Facilities:  [facility] ([place]) - have a few units available as of 11/19. Requires 2 years of private pay before accepting MA (limited availability for EW).  [facility] ([place]) - vm left 11/18  [facility] ([place]) - has studio apt available as of 11/18. Requires 2 years of private pay before accepting MA.     Legal Status:  MI Commitment with [facility]  File Number: [id number]  Start and expiration date of commitment: 10/24/24 - 04/24/25    [facility] meds: Haldol, Clozaril, Risperdal, Invega, Zyprexa, Seroquel, Abilify    PPS/CM:  Shelby Chowdary: [phone]  [email or web address].    Contacts:  Maria C Aaron (Daughter): [phone]   Clifford Agnieszka (ex-wife): [phone]     No Del Angel (guardianship/conservatorship ): [phone]  [email or web address]     Upcoming Meetings and Dates/Important Information and next steps:  Follow up with family regarding emergency  guardianship/conservatorship and list of Memory Care facilities  Discharge planning when appropriate  Pt does not qualify for MA per financial counselor on 11/8/2024. Pt will likely privately pay for Memory Care until he qualifies for MA/waivers in the future.  Pt will need to apply for MA before a [employer] Assessment/SMRT can be completed for waivers.    Provisional Discharge and Change of Status needed at discharge

## 2024-11-19 NOTE — TELEPHONE ENCOUNTER
5:38 PM 3B/RN declined d/t requiring SIO. WL and admit board updated.    R: [facility] Call Log 11/19/24  @3:15 PM:  Intake has called facilities that have not updated the bed status within the last 12 hours.                        Transfer to [address] [place] [address] is at capacity.               Pt remains on the work list pending appropriate bed availability.

## 2024-11-19 NOTE — TELEPHONE ENCOUNTER
R: [place]  Access Inpatient Bed Call Log  11/19/24 @ 1:00am   Intake has called facilities that have not updated their bed status within the last 12 hours.     *[facility] Only: Transfer  [place] [facility]: @ capacity.     Pt remains on waitlist pending appropriate placement availability.

## 2024-11-19 NOTE — CARE PLAN
"  Rehab Group    Start time: 1330  End time: 1415  Patient time total: 45 minutes    attended full group     #3 attended   Group Type: occupational therapy   Group Topic Covered: activity therapy, cognitive activities, and healthy leisure time     Group Session Detail:  Leisure exploration and engagement offered for increased intrinsic motivation to engage in social non-obligatory occupations, challenge new learning, sequencing, problem solving, and retention via a cognitive group game (card game).     Patient Response/Contribution:  socially appropriate, attentive, and required repetition of directions     Patient Detail:  Overall positive affect. Required encouragement and direct verbal cue to attend group.     Able to learn and follow novel game instructions with mod-max assist along the way. Overall mostly able to track the activity, though did need assistance to follow pattern and rectify errors. Pt was mildly delayed with processing and needed additional time to play each turn.     Became distracted by suspected VH as he pointed out the group window towards the river and stated \"Look those guys are tagging their bucks. The guys in the orange hats. I wish I was with them\". Accepted redirection back to game. 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 Mildly receptive to supportive words from writer and peers.       [id number] OT Group (2 or more in attendance)      Patient Active Problem List   Diagnosis    Hyperlipidemia LDL goal <130    Hypertension goal BP (blood pressure) < 130/80    Hypercalcemia    Hyperparathyroidism (H)    Thalassemia, unspecified type    Psychosis, unspecified psychosis type (H)    Acute psychosis (H)       "

## 2024-11-19 NOTE — PLAN OF CARE
"Pt denies SI.  Pt asked pt what day of the week it is. Pt stated he did not know.  Asked pt what year it is pt stated 2024.  Pt stated he wants to discharge. Pt stated currently waiting for MD to come to talk to them.  Pt at times pauses when talks appears to be trying to find words or maybe confused.  Pt out in lounge eating meals and took medications w/o issues.    Problem: Adult Behavioral Health Plan of Care  Goal: Plan of Care Review  Outcome: Not Progressing  Goal: Patient-Specific Goal (Individualization)  Description: You can add care plan individualizations to a care plan. Examples of Individualization might be:  \"Parent requests to be called daily at 9am for status\", \"I have a hard time hearing out of my right ear\", or \"Do not touch me to wake me up as it startles  me\".  Outcome: Not Progressing  Goal: Adheres to Safety Considerations for Self and Others  Outcome: Not Progressing  Intervention: Develop and Maintain Individualized Safety Plan  Recent Flowsheet Documentation  Taken 11/19/2024 1000 by Catherine Szymanski RN  Safety Measures: environmental rounds completed  Goal: Absence of New-Onset Illness or Injury  Outcome: Not Progressing  Intervention: Identify and Manage Fall Risk  Recent Flowsheet Documentation  Taken 11/19/2024 1000 by Catherine Szymanski RN  Safety Measures: environmental rounds completed  Goal: Optimized Coping Skills in Response to Life Stressors  Outcome: Not Progressing  Goal: Develops/Participates in Therapeutic [place] to Support Successful Transition  Outcome: Not Progressing     Problem: Psychotic Signs/Symptoms  Goal: Improved Behavioral Control (Psychotic Signs/Symptoms)  Outcome: Not Progressing  Goal: Optimal Cognitive Function (Psychotic Signs/Symptoms)  Outcome: Not Progressing  Goal: Increased Participation and Engagement (Psychotic Signs/Symptoms)  Outcome: Not Progressing  Goal: Improved Mood Symptoms (Psychotic Signs/Symptoms)  Outcome: Not Progressing  Goal: Improved " Psychomotor Symptoms (Psychotic Signs/Symptoms)  Outcome: Not Progressing  Intervention: Manage Psychomotor Movement  Recent Flowsheet Documentation  Taken 11/19/2024 1000 by Catherine Szymanski RN  Activity (Behavioral Health):   up ad diya   activity encouraged  Goal: Decreased Sensory Symptoms (Psychotic Signs/Symptoms)  Outcome: Not Progressing  Goal: Improved Sleep (Psychotic Signs/Symptoms)  Outcome: Not Progressing  Goal: Enhanced Social, Occupational or Functional Skills (Psychotic Signs/Symptoms)  Outcome: Not Progressing     Problem: Depression  Goal: Improved Mood  Outcome: Not Progressing     Problem: Suicidal Behavior  Goal: Suicidal Behavior is Absent or Managed  Outcome: Not Progressing     Problem: Anxiety  Goal: Anxiety Reduction or Resolution  Outcome: Not Progressing     Problem: Sleep Disturbance  Goal: Adequate Sleep/Rest  Outcome: Not Progressing     Problem: Seclusion/Restraint, Behavioral  Goal: Seclusion/Behavioral Restraint Goal: Absence of Harm or Injury  Outcome: Not Progressing  Intervention: Implement Least Restrictive Safety Strategies  Recent Flowsheet Documentation  Taken 11/19/2024 1000 by Catherine Szymanski, RN  Safety Measures: environmental rounds completed     Problem: Pain Acute  Goal: Optimal Pain Control and Function  Outcome: Not Progressing   Goal Outcome Evaluation:

## 2024-11-20 ENCOUNTER — TELEPHONE (OUTPATIENT)
Dept: BEHAVIORAL HEALTH | Facility: CLINIC | Age: 64
End: 2024-11-20
Payer: COMMERCIAL

## 2024-11-20 PROCEDURE — 250N000013 HC RX MED GY IP 250 OP 250 PS 637

## 2024-11-20 PROCEDURE — 124N000002 HC R&B MH UMMC

## 2024-11-20 PROCEDURE — 99231 SBSQ HOSP IP/OBS SF/LOW 25: CPT | Mod: GC | Performed by: PSYCHIATRY & NEUROLOGY

## 2024-11-20 RX ADMIN — CLONIDINE HYDROCHLORIDE 0.1 MG: 0.1 TABLET ORAL at 09:53

## 2024-11-20 RX ADMIN — ATORVASTATIN CALCIUM 40 MG: 20 TABLET, FILM COATED ORAL at 07:51

## 2024-11-20 RX ADMIN — CLONIDINE HYDROCHLORIDE 0.1 MG: 0.1 TABLET ORAL at 19:53

## 2024-11-20 RX ADMIN — Medication 1 PATCH: at 10:03

## 2024-11-20 RX ADMIN — FUROSEMIDE 40 MG: 40 TABLET ORAL at 09:53

## 2024-11-20 RX ADMIN — OLANZAPINE 5 MG: 5 TABLET, ORALLY DISINTEGRATING ORAL at 19:53

## 2024-11-20 RX ADMIN — Medication 3 MG: at 19:52

## 2024-11-20 RX ADMIN — METOPROLOL SUCCINATE 50 MG: 50 TABLET, EXTENDED RELEASE ORAL at 09:53

## 2024-11-20 RX ADMIN — LISINOPRIL 40 MG: 10 TABLET ORAL at 09:53

## 2024-11-20 RX ADMIN — CINACALCET 30 MG: 30 TABLET ORAL at 07:51

## 2024-11-20 RX ADMIN — AMLODIPINE BESYLATE 10 MG: 5 TABLET ORAL at 09:53

## 2024-11-20 ASSESSMENT — ACTIVITIES OF DAILY LIVING (ADL)
ADLS_ACUITY_SCORE: 0
ADLS_ACUITY_SCORE: 26
ADLS_ACUITY_SCORE: 26
ORAL_HYGIENE: INDEPENDENT
ADLS_ACUITY_SCORE: 0
ADLS_ACUITY_SCORE: 26
ADLS_ACUITY_SCORE: 26
DRESS: INDEPENDENT;STREET CLOTHES
HYGIENE/GROOMING: INDEPENDENT
ADLS_ACUITY_SCORE: 0
ADLS_ACUITY_SCORE: 26
ORAL_HYGIENE: INDEPENDENT
ADLS_ACUITY_SCORE: 0
DRESS: INDEPENDENT;STREET CLOTHES
LAUNDRY: UNABLE TO COMPLETE
ADLS_ACUITY_SCORE: 0
HYGIENE/GROOMING: INDEPENDENT
ADLS_ACUITY_SCORE: 0
ADLS_ACUITY_SCORE: 0
ADLS_ACUITY_SCORE: 26
ADLS_ACUITY_SCORE: 0
ADLS_ACUITY_SCORE: 26
ADLS_ACUITY_SCORE: 0

## 2024-11-20 NOTE — TELEPHONE ENCOUNTER
On list for transfer to .     R: [facility] Call Log 11/20/24  @0845 Intake has called facilities that have not updated the bed status within the last 12 hours.                             [facility] is at capacity.             Pt remains on the work list pending appropriate bed availability.     1:14 PM Called  CRN [facility] to see if unit has capabilities to accommodate admission today. He states that the unit already has 3 SIOs but he is going to discuss with NM can call back.   1:48 PM Writer informed that per 3B CRN, pt is not appropriate for current milieu.

## 2024-11-20 NOTE — PLAN OF CARE
BEH IP Unit Acuity Rating Score (UARS)  Patient is given one point for every criteria they meet.    CRITERIA SCORING   On a 72 hour hold, court hold, committed, stay of commitment, or revocation. 1    Patient LOS on BEH unit exceeds 20 days. 1  LOS: 39   Patient under guardianship, 55+, otherwise medically complex, or under age 11. 1   Suicide ideation without relief of precipitating factors. 0   Current plan for suicide. 0   Current plan for homicide. 0   Imminent risk or actual attempt to seriously harm another without relief of factors precipitating the attempt. 1   Severe dysfunction in daily living (ex: complete neglect for self care, extreme disruption in vegetative function, extreme deterioration in social interactions). 1   Recent (last 7 days) or current physical aggression in the ED or on unit. 0   Restraints or seclusion episode in past 72 hours. 0   Recent (last 7 days) or current verbal aggression, agitation, yelling, etc., while in the ED or unit. 0   Active psychosis. 1   Need for constant or near constant redirection (from leaving, from others, etc).  0   Intrusive or disruptive behaviors. 1   Patient requires 3 or more hours of individualized nursing care per 8-hour shift (i.e. for ADLs, meds, therapeutic interventions). 1   TOTAL 8

## 2024-11-20 NOTE — PLAN OF CARE
"Pt denies SI.  When talking with pt he said look over there its [place].  Explained to pt that there were no people where he was pointing.  Pt out in [facility] is social with other patients.  Talking to pt about hockey which he was talking about too.  Then in middle of conversation he's tarts talking about some marina up there.  And something about construction.   Pt frequently does not stay on topic with conversation and will jump around and seems confused.   gave him guardianship paperwork.   Problem: Adult Behavioral Health Plan of Care  Goal: Plan of Care Review  Outcome: Not Progressing  Goal: Patient-Specific Goal (Individualization)  Description: You can add care plan individualizations to a care plan. Examples of Individualization might be:  \"Parent requests to be called daily at 9am for status\", \"I have a hard time hearing out of my right ear\", or \"Do not touch me to wake me up as it startles  me\".  Outcome: Not Progressing  Goal: Adheres to Safety Considerations for Self and Others  Outcome: Not Progressing  Intervention: Develop and Maintain Individualized Safety Plan  Recent Flowsheet Documentation  Taken 11/20/2024 1100 by Catherine Szymanski RN  Safety Measures: environmental rounds completed  Goal: Absence of New-Onset Illness or Injury  Outcome: Not Progressing  Intervention: Identify and Manage Fall Risk  Recent Flowsheet Documentation  Taken 11/20/2024 1100 by Catherine Szymanski RN  Safety Measures: environmental rounds completed  Goal: Optimized Coping Skills in Response to Life Stressors  Outcome: Not Progressing  Goal: Develops/Participates in Therapeutic [place] to Support Successful Transition  Outcome: Not Progressing     Problem: Psychotic Signs/Symptoms  Goal: Improved Behavioral Control (Psychotic Signs/Symptoms)  Outcome: Not Progressing  Goal: Optimal Cognitive Function (Psychotic Signs/Symptoms)  Outcome: Not Progressing  Goal: Increased Participation and Engagement (Psychotic " Signs/Symptoms)  Outcome: Not Progressing  Goal: Improved Mood Symptoms (Psychotic Signs/Symptoms)  Outcome: Not Progressing  Goal: Improved Psychomotor Symptoms (Psychotic Signs/Symptoms)  Outcome: Not Progressing  Intervention: Manage Psychomotor Movement  Recent Flowsheet Documentation  Taken 11/20/2024 1100 by Catherine Szymanski RN  Activity (Behavioral Health):   up ad diya   activity encouraged  Goal: Decreased Sensory Symptoms (Psychotic Signs/Symptoms)  Outcome: Not Progressing  Goal: Improved Sleep (Psychotic Signs/Symptoms)  Outcome: Not Progressing  Goal: Enhanced Social, Occupational or Functional Skills (Psychotic Signs/Symptoms)  Outcome: Not Progressing     Problem: Depression  Goal: Improved Mood  Outcome: Not Progressing     Problem: Suicidal Behavior  Goal: Suicidal Behavior is Absent or Managed  Outcome: Not Progressing     Problem: Anxiety  Goal: Anxiety Reduction or Resolution  Outcome: Not Progressing     Problem: Sleep Disturbance  Goal: Adequate Sleep/Rest  Outcome: Not Progressing     Problem: Seclusion/Restraint, Behavioral  Goal: Seclusion/Behavioral Restraint Goal: Absence of Harm or Injury  Outcome: Not Progressing  Intervention: Implement Least Restrictive Safety Strategies  Recent Flowsheet Documentation  Taken 11/20/2024 1100 by Catherine Szymanski, RN  Safety Measures: environmental rounds completed     Problem: Pain Acute  Goal: Optimal Pain Control and Function  Outcome: Not Progressing   Goal Outcome Evaluation:

## 2024-11-20 NOTE — PLAN OF CARE
The pt continues on SIO 1:1 during evening and night shifts due to assault risk and severe intrusiveness. The pt was visible in the milieu for most of the shift, watching TV and minimally socializing with selected peers. The pt was presented with a calm mood, displaying a flat/labile affect. The pt reported feeling in a good mood but continued to express a desire to leave. The pt speech remain tangential and the pt occasionally appeared disoriented to situation. The pt also appeared to experience visual hallucinations, making remarks about seeing people and objects on ceiling that were not present, and requested to perform plumbing on the floor. Staff provided verbal redirection, to which the pt was receptive, and no safety or behavioral escalations were observed or reported. The pt denied all MH concerns and contracted for safety. The pt complied with med and care.    /71 (BP Location: Left arm, Patient Position: Sitting, Cuff Size: Adult Large)   Pulse 62   Temp 97.8  F (36.6  C) (Oral)   Resp 16   Wt 106 kg (233 lb 11.2 oz)   SpO2 94%   BMI 37.72 kg/m      Problem: Psychotic Signs/Symptoms  Goal: Improved Behavioral Control (Psychotic Signs/Symptoms)  Outcome: Progressing  Intervention: Manage Behavior  Recent Flowsheet Documentation  Taken 11/20/2024 1530 by Jarrod Keenan RN  De-Escalation Techniques: verbally redirected  Goal: Improved Mood Symptoms (Psychotic Signs/Symptoms)  Outcome: Progressing  Intervention: Optimize Emotion and Mood  Recent Flowsheet Documentation  Taken 11/20/2024 1530 by Jarrod Keenan RN  Supportive Measures:   verbalization of feelings encouraged             positive reinforcement provided   active listening utilized                                   self-care encouraged   self-reflection promoted                                 self-responsibility promoted   relaxation techniques promoted  Diversional Activity:   television   art work   Goal Outcome  Evaluation:    Plan of Care Reviewed With: patient

## 2024-11-20 NOTE — PROGRESS NOTES
"  ----------------------------------------------------------------------------------------------------------  [facility]  Psychiatry Progress Note  Hospital Day #39     Interim History:     The patient's care was discussed with the treatment team and chart notes were reviewed.    Vitals: Low HR of 54  Sleep: 5 hours (11/20/24 0600)  Scheduled medications: Did not take scheduled olanzapine 2.5 mg half-tablet   Psychiatric PRN medications: No psychiatric prns given    Staff Report:   - Patient watched TV in the milieu.    - Patient took a shower yesterday.   - Attended OT group. OT suspected the patient had a visual hallucination as he point out the window towards the river and stated, \"Look those guys are tagging their bucks. The guys in the orange hats. I wish I was with them.\"     Please see staff notes for details.      Subjective:     Patient Interview:  Estevan Aaron is interviewed in the [facility]. He reports being fine. He reports his mood is fine. He asked the team \"How are you doing?\" He denies any thoughts of hurting himself or someone else. Tells the team \"I wasn't seeing anything yesterday.\" Denies hearing any voices. He says \"I want to get out of this situation that I am in. I have a big family I want to go back to.\" Does not have any questions for the team. Requested to get sagittal images of his brain MRI.     ROS:  Negative except for above.      Objective:     Vitals:  /68   Pulse 54   Temp 98.2  F (36.8  C) (Oral)   Resp 16   Wt 106 kg (233 lb 11.2 oz)   SpO2 95%   BMI 37.72 kg/m      Allergies:  No Known Allergies    Current Medications:  Scheduled:  Current Facility-Administered Medications   Medication Dose Route Frequency Provider Last Rate Last Admin    acetaminophen (TYLENOL) tablet 650 mg  650 mg Oral Q4H PRN Nikki Caceres MD   650 mg at 11/14/24 0613    alum & mag hydroxide-simethicone (MAALOX) suspension 30 mL  30 mL Oral Q4H PRN " Nikki Caceres MD   30 mL at 10/17/24 0837    amLODIPine (NORVASC) tablet 10 mg  10 mg Oral Daily Presley Barrera MD   10 mg at 11/19/24 0751    atorvastatin (LIPITOR) tablet 40 mg  40 mg Oral Daily Nikki Caceres MD   40 mg at 11/19/24 0751    cholecalciferol (VITAMIN D3) capsule 1,250 mcg  1,250 mcg Oral Q7 Days Evelia Grimm DO   1,250 mcg at 11/19/24 1259    cinacalcet (SENSIPAR) tablet 30 mg  30 mg Oral Daily Presley Barrera MD   30 mg at 11/19/24 0751    cloNIDine (CATAPRES) tablet 0.1 mg  0.1 mg Oral BID Presley Barrera MD   0.1 mg at 11/19/24 1919    furosemide (LASIX) tablet 40 mg  40 mg Oral Daily Presley Barrera MD   40 mg at 11/19/24 0752    gabapentin (NEURONTIN) capsule 100 mg  100 mg Oral Q6H PRN Nikki Caceres MD   100 mg at 11/15/24 0418    hydrocortisone (CORTAID) 0.5 % cream   Topical Daily PRN Savi Chamorro MD        lisinopril (ZESTRIL) tablet 40 mg  40 mg Oral Daily Presley Barrera MD   40 mg at 11/19/24 0751    melatonin tablet 3 mg  3 mg Oral At Bedtime Presley Barrera MD   3 mg at 11/19/24 1919    metoprolol succinate ER (TOPROL XL) 24 hr tablet 50 mg  50 mg Oral Daily Presley Barrera MD   50 mg at 11/19/24 0751    nicotine (NICODERM CQ) 14 MG/24HR 24 hr patch 1 patch  1 patch Transdermal Daily Evelia Grimm DO   1 patch at 11/19/24 0758    nicotine (NICODERM CQ) 21 MG/24HR 24 hr patch 1 patch  1 patch Transdermal Daily PRN Britt Kang MD   1 patch at 10/13/24 1611    nicotine (NICORETTE) gum 2 mg  2 mg Buccal Q1H PRN González Garcia MD   2 mg at 10/24/24 1254    OLANZapine (zyPREXA) tablet 5 mg  5 mg Oral TID PRN Evelia Grimm DO   5 mg at 11/18/24 0434    Or    OLANZapine (zyPREXA) injection 5 mg  5 mg Intramuscular TID PRN Evelia Grimm,         OLANZapine zydis (zyPREXA) ODT half-tab 2.5 mg  2.5 mg Oral QAM Presley Barrera MD   2.5 mg at 11/18/24 1514    OLANZapine zydis (zyPREXA) ODT tab 5 mg  5 mg Oral At Bedtime González Garcia MD   5 mg at 11/19/24 1919     polyethylene glycol (MIRALAX) Packet 17 g  17 g Oral Daily PRN Nikki Caceres MD        traZODone (DESYREL) half-tab 25 mg  25 mg Oral At Bedtime PRN Evelia Grimm DO   25 mg at 11/18/24 0434    Or    traZODone (DESYREL) tablet 50 mg  50 mg Oral At Bedtime PRN Evelia Grimm DO           PRN:  Current Facility-Administered Medications   Medication Dose Route Frequency Provider Last Rate Last Admin    acetaminophen (TYLENOL) tablet 650 mg  650 mg Oral Q4H PRN Nikki Caceres MD   650 mg at 11/14/24 0613    alum & mag hydroxide-simethicone (MAALOX) suspension 30 mL  30 mL Oral Q4H PRN Nikki Caceres MD   30 mL at 10/17/24 0837    amLODIPine (NORVASC) tablet 10 mg  10 mg Oral Daily Presley Barrera MD   10 mg at 11/19/24 0751    atorvastatin (LIPITOR) tablet 40 mg  40 mg Oral Daily Nikki Caceres MD   40 mg at 11/19/24 0751    cholecalciferol (VITAMIN D3) capsule 1,250 mcg  1,250 mcg Oral Q7 Days Evelia Grimm DO   1,250 mcg at 11/19/24 1259    cinacalcet (SENSIPAR) tablet 30 mg  30 mg Oral Daily Presley Barrera MD   30 mg at 11/19/24 0751    cloNIDine (CATAPRES) tablet 0.1 mg  0.1 mg Oral BID Presley Barrera MD   0.1 mg at 11/19/24 1919    furosemide (LASIX) tablet 40 mg  40 mg Oral Daily Presley Barrera MD   40 mg at 11/19/24 0752    gabapentin (NEURONTIN) capsule 100 mg  100 mg Oral Q6H PRN Nikki Caceres MD   100 mg at 11/15/24 0418    hydrocortisone (CORTAID) 0.5 % cream   Topical Daily PRN Savi Chamorro MD        lisinopril (ZESTRIL) tablet 40 mg  40 mg Oral Daily Presley Barrera MD   40 mg at 11/19/24 0751    melatonin tablet 3 mg  3 mg Oral At Bedtime Presley Barrera MD   3 mg at 11/19/24 1919    metoprolol succinate ER (TOPROL XL) 24 hr tablet 50 mg  50 mg Oral Daily Presley Barrera MD   50 mg at 11/19/24 0751    nicotine (NICODERM CQ) 14 MG/24HR 24 hr patch 1 patch  1 patch Transdermal Daily Evelia Grimm DO   1 patch at 11/19/24 0758    nicotine (NICODERM CQ) 21 MG/24HR 24 hr patch 1  patch  1 patch Transdermal Daily PRN Britt Kang MD   1 patch at 10/13/24 1611    nicotine (NICORETTE) gum 2 mg  2 mg Buccal Q1H PRN González Garcia MD   2 mg at 10/24/24 1254    OLANZapine (zyPREXA) tablet 5 mg  5 mg Oral TID PRN Evelia Grimm DO   5 mg at 11/18/24 0434    Or    OLANZapine (zyPREXA) injection 5 mg  5 mg Intramuscular TID PRN Evelia Grimm DO        OLANZapine zydis (zyPREXA) ODT half-tab 2.5 mg  2.5 mg Oral QAM Presley Barrera MD   2.5 mg at 11/18/24 1514    OLANZapine zydis (zyPREXA) ODT tab 5 mg  5 mg Oral At Bedtime González Garcia MD   5 mg at 11/19/24 1919    polyethylene glycol (MIRALAX) Packet 17 g  17 g Oral Daily PRN Nikki Caceres MD        traZODone (DESYREL) half-tab 25 mg  25 mg Oral At Bedtime PRN Evelia Grimm DO   25 mg at 11/18/24 0434    Or    traZODone (DESYREL) tablet 50 mg  50 mg Oral At Bedtime PRN Evelia Grimm DO           Labs and Imaging:  Data this admission:  - CBC unremarkable  - CMP unremarkable  - TSH normal  - UDS negative  - Vit D low  - Hgb A1c 5.9 (10/13/24)  - Lipids unremarkable  - Vit B12 normal  - Folate normal  - Urinalysis unremarkable  - EKG normal sinus rhythm, QTc 390 ms  - Head CT showed no acute changes  - HIV non reactive  - Treponema antibody non reactive  - ESR wnl   - Ceruloplasmin wnl   - BOOGIE negative  - Lyme negative      Parathyroid hormone:   10/13: 85     Calcium:  10/14: 11.4  10/16: 10.3  10/17: 10.3  10/19: 9.8  10/21: 10.6  10/23: 10.3     Albumin:  10/14: 4.3  10/16: 4.3  10/17: 4.1  10/19: 4.2  10/21: 4.5  10/23: 4.3      CXR:   Impression:   1. No definite radiographic evidence of asbestosis. If clinically  indicated, consider evaluation with high resolution chest CT.  2. Nonspecific left costophrenic blunting. Given symmetrically low  lung volumes may be related to poor inspiratory effort/timing.  3. Pulmonary vascular congestion.     MRI:   IMPRESSION:  1. No acute intracranial pathology.  2. No focal lesion or structural  "abnormality.   3. Symmetric frontoparietal cortical volume loss slightly more than  expected for age.     Mental Status Exam:     Oriented to:  Oriented to the building   General:  Awake and Confused  Appearance:  appears stated age, Grooming is adequate, and Dressed in hospital scrubs  Behavior/Attitude:  Calm, Cooperative, and Engaged  Eye Contact: Appropriate  Psychomotor: No evidence of tics, dystonia, or tardive dyskinesia  no catatonia present  Speech:  appropriate volume/tone  Language: Fluent in English with appropriate syntax and vocabulary.  Mood:  \"Fine\"  Affect:  congruent with mood and irritable  Thought Process:  disorganized and confused  Thought Content:   Denies SI/HI/AH/VH; Delusions of confusion  Associations:  loose   Insight:  limited   Judgment:  limited  Impulse control: limited  Attention Span:  decreased  Concentration:  distractible  Recent and Remote Memory:  recent memory impaired, remote memory intact   Fund of Knowledge: average  Muscle Strength and Tone: normal  Gait and Station: Normal     Psychiatric Assessment     Estevan Aaron is a 64 year old male with previous psychiatric diagnoses of GEORGINA admitted from the ER on 10/12/2024 due to concern for HI and psychosis in the context of medical issues (hyperthyroidism, hypercalcemia), psychosocial stressors including with recent divorce and selling his home. This is the patient's first psychiatric hospitalization. Significant symptoms on admission included delusions of persecution with grandiose beliefs, homicidal ideations, as well as disorganized thinking and behavior. The MSE on admission was pertinent for a thought process which was perseverative, circumstantial, tangential, disorganized and tangential; with rambling and looseness of associations. Psychological contributions to presentation included lack of insight. Social factors contributing to presentation included isolation, recent divorce, selling his house, and moving from [facility] " to hotel. Biological contributions to presentation included a history of hyperparathyroidism with chronic hypercalcemia per charts as well as a history of methamphetamine use per collateral from ex-wife.     History was difficult to obtain due to the patient's severe disorganized thinking on interview; he was observed with persecutory delusions pertaining to the realtor and gang members, disorganized behavior as these delusions have led him to flee to different hotels to stay safe/ has called  complaining of being targeted and auditory hallucinations of voices for the past 12 months. Per collateral from ex-wife, Santos has had paranoid ideations since they first met. He has always felt like people are out to get him or trying to rip him off. She says that he has had visual hallucinations (he will point things out that the rest of the family can't see) for a long time, but the family would just go along with him to avoid making him angry. This timeline and presentation could be consistent with diagnosis of paranoid personality disorder. Things began to worsen around the beginning of the COVID pandemic, when Santos became more isolated and the family started to notice cognitive issues such as impaired memory. Immediately prior to this hospitalization, the ex-wife found Santos in a hotel room with a generator full of gasoline, binoculars, and hunting equipment. This time course does not suggest acute psychosis, however given the patient has never had a full psychiatric work-up, we completed a first-episode psychosis work-up.      Other things that could be contributing to his presentation is hyperparathyroidism with hypercalcemia. However, patient's calcium returned to normal limits on 10/16, and patient continues to have psychosis so likely not a significant contributing factor to patient's presentation.      Patient also continues to be disoriented and his behaviors appear to worsen in the evenings. This raised concern  for underlying neurocognitive disorder with delirium. Patient received MRI brain with and without contrast which showed frontal and parietal atrophy greater than would be expected for patient's age. This is consistent with neurocognitive disorder. Patient has continued to improve with decreasing his antipsychotic. Working to get patient transferred to geriatric psychiatry in the short term and to memory care long term.      Given that he currently has psychosis, patient warrants inpatient psychiatric hospitalization to maintain his safety.     Psychiatric Plan by Diagnosis      Today's changes:  - None     # Major Neurocognitive Disorder  # Rule out paranoid personality disorder  1. Medications:  - Discontinued PTA fluoxetine 40 mg, consider restarting at a later time   - Olanzapine 2.5 mg during afternoon and 5 mg at bedtime  - Neurology consult 11/8/24, recommend outpatient follow-up and neuropsychiatric testing     2. Pertinent Labs/Monitoring:   - See above     3. Additional Plans:  - Patient will be treated in therapeutic milieu with appropriate individual and group therapies as described     # Unspecified anxiety vs Generalized Anxiety Disorder  - Monitor for symptoms.  - Fluoxetine held due to suspicion of ongoing manic symptoms     Psychiatric Hospital Course:      Estevan Aaron was admitted to [facility] on a 72 hour hold.   Medications:  PTA fluoxetine was held due to concern for worsening of zelalem   New medications started at the time of admission include Zyprexa.   Increased olanzapine 10 mg at bedtime was to 10 mg BID (10/14)   Increased olanzapine 10 mg BID to 10 mg during day and 15 mg at bedtime (10/15)  10/21: increased olanzapine pm dose from 15 to 20 mg  10/23: started melatonin 3 mg at 7 pm to help patient with circadian rhythm as he has been staying up throughout the night and sleeping a lot during the day and there is some concern for delirium   10/24: consulted anesthesia for MRI brain    10/28: MRI brain complete, decrease AM olanzapine from 10 to 5 mg  10/29: Morning olanzapine decreased to 2.5 mg, evening olanzapine decreased to 15 mg   11/1: Decreased evening olanzapine to 10 mg   11/4: Decreased evening olanzapine to 7.5 mg   11/5: Decreased evening olanzapine to 5 mg   11/11: Moved morning dose of olanzapine to the afternoon as patient appears more agitated in the afternoons/evenings     Care conference 10/18/24 with daughter Maria C and ex-wife Cilfford  - Report that patient has always been paranoid since ex-wife first met him. She describes him always feeling like he is getting ripped off.   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 They reported once incidence in which he made a bid on a job and the client paid him, but he never ended up finishing the job  - Wife and him  officially this year, and since selling the house several months ago, patient has been moving from hotel to hotel due to thinking people are out to get him   - When they were selling their house, patient threatened realtors and felt he was being ripped off   - Clifford reports that she started to be afraid to be around him alone  - When he was found in the hotel, he had a generator full of gasoline, binoculars, bullets, and hunting equipment.     10/21/24: updated daughter on Santos's treatment plan      10/23/24: updated daughter on Santos's treatment plan      10/25/24: updated daughter on Santos's treatment plan, including plan to try and get MRI brain done under sedation. She said that Santos's  grandfather had dementia and his sister has a brain tumor.      10/28/24: updated daughter on Santos's MRI results. She is planning on coming into town soon.      Care conference 11/1/24 with daughters Maria C and Estefani and ex-wife Clifford  - Discussed dementia diagnosis   - Clifford asked about plans moving forward. Explained that applying for medical assistance is the first step. Explained that application processing time can be very variable. Informed family that Santos will most likely be staying on this inpatient unit during this time.   - Clifford expressed that their family would like to be the power of /have conservatorship for Santos.   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 Reports that he has bills that he has not paid.   - Maria C is planning to move to [place], and Clifford currently lives in [place]. Family prefer that Santos would in a facility that are near these locations. Discussed with family that they can also try to request a specific location (memory care).   - Clifford reports that he had previously gotten help applying for his social security and medicare, but unsure if it had been approved.   - Informed family that there is a geriatric unit and there might be a transfer if there becomes availability on this unit.   - Family shared that he was completely different just two months ago.   - Estefani shared that his family found his medications in his old truck recently, expressed that it was likely that he was not taking his medications prior to his hospitalization.      11/6/24: updated Maria C on plan to try and transfer Santos to Geriatric unit.      11/11/24: updated Maria C on neurology consult      The risks, benefits, alternatives, and side effects were discussed and understood by the patient and other caregivers.     Medical Assessment and Plan     Medical diagnoses to be addressed this admission:    # Hypertension  - Continue PTA  medications  Furosemide 40 mg daily  Lisinopril 40 mg daily  Metoprolol 50 mg daily  Amlodipine 10 mg daily  Clonidine 0.1 mg BID     # Hyperlipidemia  - Continue PTA Atorvastatin 40 mg     # Hyperparathyroidism  # Hypercalcemia, hypophosphoremia   Increased Ca level to 10.9 and decreased Ph level to 2.3 in the ED. Suspicion patient's hypercalcemia could be contributing to symptoms of psychosis.  - Consulted endocrinology, who started cinacalcet 30 mg BID on 10/13  - 10/16 endocrinology recommended continuing to trend calcium and albumin to make sure patient does not become hypocalcemic and recommended holding cinacalcet   - 10/17, calcium and albumin wnl, endo recommended cinacalcet 30 mg once daily   - 10/21, per endo continue cincaclcet and recheck calcium and albumin on October 24  - 10/23: per endo, patient needs to follow up outpatient for further management of hyperparathyroidism      Medical course: Patient was physically examined by the ED prior to being transferred to the unit and was found to be medically stable and appropriate for admission.      Consults: Psychiatry, Endocrinology (follow-up of hyperthyroidism / hypercalcemia and hypertension), neurology     Checklist     Legal Status: Committed with [facility] (Haldol, Clozaril, Risperdal, Invega, Zyprexa, Seroquel, Abilify)     Safety Assessment:   Behavioral Orders   Procedures    Assault precautions    Code 1 - Restrict to Unit    Routine Programming     As clinically indicated    Status 15     Every 15 minutes.    Status Individual Observation     1:1 during evening shift, & night shift.    If patient refuses overnight presence of staff in his room, it's ok to do 15 min checks through the window blinds.    Patient SIO status reviewed with team/RN.  Please also refer to RN/team documentation for add'l detail.    -SIO staff to monitor following which have contributed to patient being on SIO:  Pt has psychosis regarding being followed and attacked, very  paranoid, HI    -Possible interventions SIO staff could use to support patient's treatment progress:  Redirect and reassure  -When following observed, team will review discontinuation of SIO:  Psychosis improves     Order Specific Question:   CONTINUOUS 24 hours / day     Answer:   Other     Order Specific Question:   Specify distance     Answer:   10 feet, 5 feet when close to the doors     Order Specific Question:   Indications for SIO     Answer:   Assault risk     Order Specific Question:   Indications for SIO     Answer:   Severe intrusiveness     Risk Assessment:  Risk for harm is elevated.  Risk factors: impulsive and past behaviors  Protective factors: family      Disposition: Pending stabilization, medication optimization, & development of a safe discharge plan.      Attestations     The patient was seen and discussed with my resident and attending physician.   Qi Jean, MS3  [facility] Medical Student      Resident/Fellow Attestation   I, Savi Chamorro MD, was present with the medical/ALLEN student who participated in the service and in the documentation of the note.  I have verified the history and personally performed the physical exam and medical decision making.  I agree with the assessment and plan of care as documented in the note.      Savi Chamorro MD  PGY1  Date of Service (when I saw the patient): 11/20/24     Attestation:  This patient has been seen and evaluated by me, Pete Smith MD.  I have discussed this patient with the house staff team including the resident and/or medical student and I agree with the findings and plan in this note.    I have reviewed today's vital signs, medications, labs and imaging. Pete Smith MD , PhD.

## 2024-11-20 NOTE — TELEPHONE ENCOUNTER
[facility] ONLY: 3B Unit  R: [place] [facility] Access Inpatient Bed Call Log  11/19/24 @ 1:00am    [facility]: @ capacity for Adult MH beds.     Pt remains on waitlist pending appropriate placement availability.

## 2024-11-20 NOTE — PLAN OF CARE
Problem: Sleep Disturbance  Goal: Adequate Sleep/Rest  Outcome: Progressing   Goal Outcome Evaluation:    Patient appears to have slept a total of 5 hours. Safety/environment checks conducted every 15 minutes with no concerns noted. No complaints of pain/discomfort.

## 2024-11-21 ENCOUNTER — TELEPHONE (OUTPATIENT)
Dept: BEHAVIORAL HEALTH | Facility: CLINIC | Age: 64
End: 2024-11-21
Payer: COMMERCIAL

## 2024-11-21 PROCEDURE — 99232 SBSQ HOSP IP/OBS MODERATE 35: CPT | Mod: GC | Performed by: PSYCHIATRY & NEUROLOGY

## 2024-11-21 PROCEDURE — 250N000013 HC RX MED GY IP 250 OP 250 PS 637

## 2024-11-21 PROCEDURE — 124N000002 HC R&B MH UMMC

## 2024-11-21 PROCEDURE — 97150 GROUP THERAPEUTIC PROCEDURES: CPT | Mod: GO

## 2024-11-21 RX ORDER — MEMANTINE HYDROCHLORIDE 5 MG/1
5 TABLET ORAL DAILY
Status: DISCONTINUED | OUTPATIENT
Start: 2024-11-21 | End: 2024-12-02

## 2024-11-21 RX ADMIN — CINACALCET 30 MG: 30 TABLET ORAL at 10:13

## 2024-11-21 RX ADMIN — OLANZAPINE 5 MG: 5 TABLET, ORALLY DISINTEGRATING ORAL at 19:48

## 2024-11-21 RX ADMIN — AMLODIPINE BESYLATE 10 MG: 5 TABLET ORAL at 10:13

## 2024-11-21 RX ADMIN — CLONIDINE HYDROCHLORIDE 0.1 MG: 0.1 TABLET ORAL at 19:48

## 2024-11-21 RX ADMIN — Medication 3 MG: at 19:48

## 2024-11-21 RX ADMIN — FUROSEMIDE 40 MG: 40 TABLET ORAL at 10:13

## 2024-11-21 RX ADMIN — MEMANTINE 5 MG: 5 TABLET ORAL at 13:56

## 2024-11-21 RX ADMIN — LISINOPRIL 40 MG: 10 TABLET ORAL at 10:14

## 2024-11-21 RX ADMIN — OLANZAPINE 5 MG: 5 TABLET, FILM COATED ORAL at 02:53

## 2024-11-21 RX ADMIN — Medication 1 PATCH: at 10:14

## 2024-11-21 RX ADMIN — METOPROLOL SUCCINATE 50 MG: 50 TABLET, EXTENDED RELEASE ORAL at 10:13

## 2024-11-21 RX ADMIN — OLANZAPINE 2.5 MG: 5 TABLET, ORALLY DISINTEGRATING ORAL at 13:56

## 2024-11-21 RX ADMIN — CLONIDINE HYDROCHLORIDE 0.1 MG: 0.1 TABLET ORAL at 10:13

## 2024-11-21 RX ADMIN — ATORVASTATIN CALCIUM 40 MG: 20 TABLET, FILM COATED ORAL at 10:14

## 2024-11-21 RX ADMIN — Medication 25 MG: at 02:54

## 2024-11-21 ASSESSMENT — ACTIVITIES OF DAILY LIVING (ADL)
ADLS_ACUITY_SCORE: 26
ORAL_HYGIENE: INDEPENDENT
ADLS_ACUITY_SCORE: 26
ADLS_ACUITY_SCORE: 26
HYGIENE/GROOMING: INDEPENDENT
ADLS_ACUITY_SCORE: 26
DRESS: INDEPENDENT
ADLS_ACUITY_SCORE: 26

## 2024-11-21 NOTE — TELEPHONE ENCOUNTER
R: [place]  Access Inpatient Bed Call Log  11/20/24 @ 1:00am   Intake has called facilities that have not updated their bed status within the last 12 hours.     *[facility] Only- Transfer:  [place] -- [facility]: @ capacity.     Pt remains on waitlist pending appropriate placement availability.
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
## 2024-11-21 NOTE — PLAN OF CARE
Problem: Sleep Disturbance  Goal: Adequate Sleep/Rest  Outcome: Progressing    Pt appears to have slept for  4.5 hours. PRN Zyprexa was given for agitation and PRN Trazodone was given to promote sleep. PRN medications were effective, as there was no further agitation noted or reported and pt was able to sleep after medications administration. Pt denies pain, anxiety, depression, and SI/SIB. Pt is on 1-1 SIO for assault risk and severe intrusiveness. 15 minutes safety checks were in place. Staff will continue to offer support to pt.

## 2024-11-21 NOTE — CARE PLAN
"  Rehab Group    Start time: 1330  End time: 1415  Patient time total: 45 minutes    attended full group     #3 attended   Group Type: occupational therapy   Group Topic Covered: Physical activity     Group Session Detail:  Ch[employer] for occupation-based coping skill exploration group through mindful movement to facilitate stress management, awakening and/or relaxation. Education was provided on the use of stretching, exercise, and meditation practice as a coping tool within daily/weekly routine.      Patient Response/Contribution:  positive affect and cooperative with task     Patient Detail:  Unable to engage in check-in conversation r/t experience with movement/yoga at home; Pt stated \"cold\" and \"I don't have boots\" w/o elaboration when questioned what type of movement he has enjoyed in the past.    Intermittently followed a 30 minute guided yoga practice. Appeared distracted and initiated various conversation with peers throughout sequence. Verbalized feeling \"good\" at the end of group and stated \"I needed that\".         [id number] OT Group (2 or more in attendance)      Patient Active Problem List   Diagnosis    Hyperlipidemia LDL goal <130    Hypertension goal BP (blood pressure) < 130/80    Hypercalcemia    Hyperparathyroidism (H)    Thalassemia, unspecified type    Psychosis, unspecified psychosis type (H)    Acute psychosis (H)       "

## 2024-11-21 NOTE — PLAN OF CARE
BEH IP Unit Acuity Rating Score (UARS)  Patient is given one point for every criteria they meet.    CRITERIA SCORING   On a 72 hour hold, court hold, committed, stay of commitment, or revocation. 1    Patient LOS on BEH unit exceeds 20 days. 1  LOS: 40   Patient under guardianship, 55+, otherwise medically complex, or under age 11. 1   Suicide ideation without relief of precipitating factors. 0   Current plan for suicide. 0   Current plan for homicide. 0   Imminent risk or actual attempt to seriously harm another without relief of factors precipitating the attempt. 1   Severe dysfunction in daily living (ex: complete neglect for self care, extreme disruption in vegetative function, extreme deterioration in social interactions). 1   Recent (last 7 days) or current physical aggression in the ED or on unit. 0   Restraints or seclusion episode in past 72 hours. 0   Recent (last 7 days) or current verbal aggression, agitation, yelling, etc., while in the ED or unit. 0   Active psychosis. 1   Need for constant or near constant redirection (from leaving, from others, etc).  0   Intrusive or disruptive behaviors. 1   Patient requires 3 or more hours of individualized nursing care per 8-hour shift (i.e. for ADLs, meds, therapeutic interventions). 1   TOTAL 8

## 2024-11-21 NOTE — PLAN OF CARE
"Team Note Due:  Monday    Assessment/Intervention/Current Symtoms and Care Coordination:  Chart review and met with team, discussed pt progress, symptomology, and response to treatment.  Discussed the discharge plan and any potential impediments to discharge.    Writer assisted pt with emergency guardianship/conservatorship hearing at 9:00 AM. Pt was very confused and despite multiple attempts at explaining purpose of the hearing, pt continued to believe the hearing was related to a \"construction project.\" Emergency guardianship/conservatorship was granted, will await court order to send to [facility].    Discharge Plan or Goal:  Memory care facility     Barriers to Discharge:  Patient requires further psychiatric stabilization due to current symptomology, medication management with changes subject to provider, coordination with outside supports, and aftercare planning. Pt is under civil commitment.     Referral Status:  None at this time    Family would like to consider the following Memory Care Facilities:  [facility] ([place]) - have a few units available as of 11/19. Requires 2 years of private pay before accepting MA (limited availability for ).  [facility] ([place]) - vm left 11/18  [facility] ([place]) - has studio apt available as of 11/18. Requires 2 years of private pay before accepting MA.     Legal Status:  MI Commitment with [facility]  File Number: [id number]  Start and expiration date of commitment: 10/24/24 - 04/24/25    [facility] meds: Haldol, Clozaril, Risperdal, Invega, Zyprexa, Seroquel, Abilify    PPS/CM:  Shelby Chowdary: [phone]  [email or web address].    Contacts:  Maria C Agnieszka (Daughter): [phone]   Clifford Agnieszka (ex-wife): [phone]     No Del Angel (guardianship/conservatorship ): [phone]  [email or web address]     Upcoming Meetings and Dates/Important Information and next steps:  Send " emergency guardianship/conservatorship paperwork to [facility] when received  Follow up with family regarding list of Memory Care facilities  Discharge planning when appropriate  Pt does not qualify for MA per financial counselor on 11/8/2024. Pt will likely privately pay for Memory Care until he qualifies for MA/waivers in the future.  Pt will need to apply for MA before a [employer] Assessment/SMRT can be completed for waivers.    Provisional Discharge and Change of Status needed at discharge

## 2024-11-21 NOTE — PLAN OF CARE
Problem: Psychotic Signs/Symptoms  Goal: Improved Behavioral Control (Psychotic Signs/Symptoms)  Outcome: Progressing  Intervention: Manage Behavior  Recent Flowsheet Documentation  Taken 11/21/2024 1636 by Pool Urbina RN  De-Escalation Techniques: verbally redirected     Problem: Anxiety  Goal: Anxiety Reduction or Resolution  Outcome: Progressing  Intervention: Promote Anxiety Reduction  Recent Flowsheet Documentation  Taken 11/21/2024 1636 by Pool Urbina RN  Supportive Measures:   verbalization of feelings encouraged   positive reinforcement provided   active listening utilized   self-care encouraged   self-reflection promoted   self-responsibility promoted   relaxation techniques promoted  Family/Support System Care:   involvement promoted   self-care encouraged   presence promoted   support provided   Goal Outcome Evaluation:    Plan of Care Reviewed With: patient      Patient was in the milieu the entire shift, watching TV and socializing minimally.  Mood was calm and affect was somewhat flat and labile. He was confused and intrusive at times and repeatedly asked writer to open the storage room so he could collect his belonging and go home. When writer told him it wasn't possible, patient became agitated and yelled at writer. He was however, redirectable. He refused his vitals, but ate 100% of his dinner and HS snacks. He continues on SIO 1:1 for assault risk and severe intrusiveness.Will continue to encourage and support.

## 2024-11-21 NOTE — PLAN OF CARE
Problem: Psychotic Signs/Symptoms  Goal: Improved Behavioral Control (Psychotic Signs/Symptoms)  Outcome: Not Progressing  Note: Patient appears distracted and preoccupied. Speech is tangential. He is adequately groomed. He did attend and participate in group. Spends time out in the milieu. He is cooperative with medication. He seems surprised when he looks at them as though he has no recall of what he takes. He is appropriate out on the unit. No intrusive behaviors. Requires no redirection. Aga Reardon RN      Goal Outcome Evaluation:

## 2024-11-21 NOTE — TELEPHONE ENCOUNTER
R:  Intake called 3B Charge @ 8:46am to review pt for 3B Transfer.    Warren reviewed and pt too acute for current [facility]    Pt added to admit board under decline.

## 2024-11-22 LAB — CALCIUM SERPL-MCNC: 10.6 MG/DL (ref 8.8–10.4)

## 2024-11-22 PROCEDURE — 250N000013 HC RX MED GY IP 250 OP 250 PS 637

## 2024-11-22 PROCEDURE — 124N000002 HC R&B MH UMMC

## 2024-11-22 PROCEDURE — 82310 ASSAY OF CALCIUM: CPT

## 2024-11-22 PROCEDURE — H2032 ACTIVITY THERAPY, PER 15 MIN: HCPCS

## 2024-11-22 PROCEDURE — 99231 SBSQ HOSP IP/OBS SF/LOW 25: CPT | Mod: GC | Performed by: PSYCHIATRY & NEUROLOGY

## 2024-11-22 PROCEDURE — 36415 COLL VENOUS BLD VENIPUNCTURE: CPT

## 2024-11-22 RX ADMIN — FUROSEMIDE 40 MG: 40 TABLET ORAL at 08:01

## 2024-11-22 RX ADMIN — ATORVASTATIN CALCIUM 40 MG: 20 TABLET, FILM COATED ORAL at 08:01

## 2024-11-22 RX ADMIN — CLONIDINE HYDROCHLORIDE 0.1 MG: 0.1 TABLET ORAL at 08:01

## 2024-11-22 RX ADMIN — Medication 3 MG: at 20:56

## 2024-11-22 RX ADMIN — METOPROLOL SUCCINATE 50 MG: 50 TABLET, EXTENDED RELEASE ORAL at 08:01

## 2024-11-22 RX ADMIN — AMLODIPINE BESYLATE 10 MG: 5 TABLET ORAL at 08:01

## 2024-11-22 RX ADMIN — CLONIDINE HYDROCHLORIDE 0.1 MG: 0.1 TABLET ORAL at 20:56

## 2024-11-22 RX ADMIN — CINACALCET 30 MG: 30 TABLET ORAL at 08:01

## 2024-11-22 RX ADMIN — Medication 1 PATCH: at 08:01

## 2024-11-22 RX ADMIN — OLANZAPINE 2.5 MG: 5 TABLET, ORALLY DISINTEGRATING ORAL at 14:57

## 2024-11-22 RX ADMIN — MEMANTINE 5 MG: 5 TABLET ORAL at 08:01

## 2024-11-22 RX ADMIN — OLANZAPINE 5 MG: 5 TABLET, ORALLY DISINTEGRATING ORAL at 20:56

## 2024-11-22 RX ADMIN — LISINOPRIL 40 MG: 10 TABLET ORAL at 08:00

## 2024-11-22 ASSESSMENT — ACTIVITIES OF DAILY LIVING (ADL)
HYGIENE/GROOMING: INDEPENDENT
ADLS_ACUITY_SCORE: 26
ORAL_HYGIENE: INDEPENDENT
ADLS_ACUITY_SCORE: 26
LAUNDRY: WITH SUPERVISION
ADLS_ACUITY_SCORE: 26
DRESS: INDEPENDENT
DRESS: INDEPENDENT
ORAL_HYGIENE: INDEPENDENT
ADLS_ACUITY_SCORE: 26
HYGIENE/GROOMING: INDEPENDENT

## 2024-11-22 NOTE — PLAN OF CARE
Patient observed dowsing off in the milieu area; spent a significant amount of time watching tv; briefly paced the hallway;  mood was calm and affect dull; patient  denied all psych symptoms including pain; insight continue to be somewhat inappropriate. However, there were a number of interventions utilized to help mitigate behavior(see below)    Problem: Psychotic Signs/Symptoms  Goal: Improved Behavioral Control (Psychotic Signs/Symptoms)  Outcome: Progressing  Intervention: Manage Behavior  Recent Flowsheet Documentation  Taken 11/22/2024 1600 by Pool Urbina RN  De-Escalation Techniques: verbally redirected     Problem: Anxiety  Goal: Anxiety Reduction or Resolution  Outcome: Progressing  Intervention: Promote Anxiety Reduction  Recent Flowsheet Documentation  Taken 11/22/2024 1600 by Pool Urbina, RN  Supportive Measures:   verbalization of feelings encouraged   positive reinforcement provided   active listening utilized   self-care encouraged   self-reflection promoted   self-responsibility promoted   relaxation techniques promoted  Family/Support System Care:   involvement promoted   self-care encouraged   presence promoted   support provided   Goal Outcome Evaluation:    Plan of Care Reviewed With: patient

## 2024-11-22 NOTE — TELEPHONE ENCOUNTER
R: 7:45 PM Bed Search Update [facility] Only- Pt requesting transfer to .  3B declined pt 11/21 due to current acuity.  Pt remains on worklist awaiting transfer.

## 2024-11-22 NOTE — PROGRESS NOTES
----------------------------------------------------------------------------------------------------------  [facility]  Psychiatry Progress Note  Hospital Day #41     Interim History:     The patient's care was discussed with the treatment team and chart notes were reviewed.    Vitals: Low HR of 54  Sleep: 5.25 hours (11/22/24 0643)  Scheduled medications: Took all scheduled medications as prescribed  Psychiatric PRN medications: Olanzapine 5 mg tablet x1 and trazodone 25 mg half-tablet     Staff Report:   - Visible in the milieu with minimal socializing   - Mood was calm and affect was flat and labile   - Attended OT group  - Repeatedly asked nurse to open the storage room so he could collect his belongings and go home, yelled at nurse     Please see staff notes for details.      Subjective:     Patient Interview:  Estevan Aaron was interviewed in the milieu, he reports to be doing good. He states sleeping well last night besides waking up to a lady in his room and a flashing light. He notes been dressed for work but does not know what the plan is. He says I am just sitting here making so much money. He denies experiencing any new symptoms today. He denies chest pain, dizziness, nausea, and headache. He reports having to take a lot of medications lately, and does not mind this. He denies any new medication side effects. He does not have any questions for the team at this time. Patient feels safe on the unit.    ROS:  Patient has no bothersome physical symptoms  Patient denies acute concerns     Objective:     Vitals:  /65 (BP Location: Left arm, Patient Position: Sitting, Cuff Size: Adult Large)   Pulse 54   Temp 98.8  F (37.1  C) (Oral)   Resp 16   Wt 107.4 kg (236 lb 11.2 oz)   SpO2 97%   BMI 38.20 kg/m      Allergies:  No Known Allergies    Current Medications:  Scheduled:  Current Facility-Administered Medications   Medication Dose Route Frequency  Provider Last Rate Last Admin    acetaminophen (TYLENOL) tablet 650 mg  650 mg Oral Q4H PRN Nikki Caceres MD   650 mg at 11/14/24 0613    alum & mag hydroxide-simethicone (MAALOX) suspension 30 mL  30 mL Oral Q4H PRN Nikki Caceres MD   30 mL at 10/17/24 0837    amLODIPine (NORVASC) tablet 10 mg  10 mg Oral Daily Presley Barrera MD   10 mg at 11/21/24 1013    atorvastatin (LIPITOR) tablet 40 mg  40 mg Oral Daily Nikki Caceres MD   40 mg at 11/21/24 1014    cholecalciferol (VITAMIN D3) capsule 1,250 mcg  1,250 mcg Oral Q7 Days Evelia Grimm DO   1,250 mcg at 11/19/24 1259    cinacalcet (SENSIPAR) tablet 30 mg  30 mg Oral Daily Presley Barrera MD   30 mg at 11/21/24 1013    cloNIDine (CATAPRES) tablet 0.1 mg  0.1 mg Oral BID Presley Barrera MD   0.1 mg at 11/21/24 1948    furosemide (LASIX) tablet 40 mg  40 mg Oral Daily Presley Barrera MD   40 mg at 11/21/24 1013    gabapentin (NEURONTIN) capsule 100 mg  100 mg Oral Q6H PRN Nikki Caceres MD   100 mg at 11/15/24 0418    hydrocortisone (CORTAID) 0.5 % cream   Topical Daily PRN Savi Chamorro MD        lisinopril (ZESTRIL) tablet 40 mg  40 mg Oral Daily Presley Barrera MD   40 mg at 11/21/24 1014    melatonin tablet 3 mg  3 mg Oral At Bedtime Presley Barrera MD   3 mg at 11/21/24 1948    memantine (NAMENDA) tablet 5 mg  5 mg Oral Daily Savi Chamorro MD   5 mg at 11/21/24 1356    metoprolol succinate ER (TOPROL XL) 24 hr tablet 50 mg  50 mg Oral Daily Presley Barrera MD   50 mg at 11/21/24 1013    nicotine (NICODERM CQ) 14 MG/24HR 24 hr patch 1 patch  1 patch Transdermal Daily Evelia Grimm DO   1 patch at 11/21/24 1014    nicotine (NICODERM CQ) 21 MG/24HR 24 hr patch 1 patch  1 patch Transdermal Daily PRN Britt Kang MD   1 patch at 10/13/24 1611    nicotine (NICORETTE) gum 2 mg  2 mg Buccal Q1H PRN González Garcia MD   2 mg at 10/24/24 1254    OLANZapine (zyPREXA) tablet 5 mg  5 mg Oral TID PRN Evelia Grimm DO   5 mg at  11/21/24 0253    Or    OLANZapine (zyPREXA) injection 5 mg  5 mg Intramuscular TID PRN Evelia Grimm DO        OLANZapine zydis (zyPREXA) ODT half-tab 2.5 mg  2.5 mg Oral QAM Presley Barrera MD   2.5 mg at 11/21/24 1356    OLANZapine zydis (zyPREXA) ODT tab 5 mg  5 mg Oral At Bedtime González Garcia MD   5 mg at 11/21/24 1948    polyethylene glycol (MIRALAX) Packet 17 g  17 g Oral Daily PRN Nikki Caceres MD        traZODone (DESYREL) half-tab 25 mg  25 mg Oral At Bedtime PRN Evelia Grimm DO   25 mg at 11/21/24 0254    Or    traZODone (DESYREL) tablet 50 mg  50 mg Oral At Bedtime PRN Evelia Grimm DO           PRN:  Current Facility-Administered Medications   Medication Dose Route Frequency Provider Last Rate Last Admin    acetaminophen (TYLENOL) tablet 650 mg  650 mg Oral Q4H PRN Nikki Caceres MD   650 mg at 11/14/24 0613    alum & mag hydroxide-simethicone (MAALOX) suspension 30 mL  30 mL Oral Q4H PRN Nikki Caceres MD   30 mL at 10/17/24 0837    amLODIPine (NORVASC) tablet 10 mg  10 mg Oral Daily Presley Barrera MD   10 mg at 11/21/24 1013    atorvastatin (LIPITOR) tablet 40 mg  40 mg Oral Daily Nikki Caceres MD   40 mg at 11/21/24 1014    cholecalciferol (VITAMIN D3) capsule 1,250 mcg  1,250 mcg Oral Q7 Days Evelia Grimm DO   1,250 mcg at 11/19/24 1259    cinacalcet (SENSIPAR) tablet 30 mg  30 mg Oral Daily Presley Barrera MD   30 mg at 11/21/24 1013    cloNIDine (CATAPRES) tablet 0.1 mg  0.1 mg Oral BID Presley Barrera MD   0.1 mg at 11/21/24 1948    furosemide (LASIX) tablet 40 mg  40 mg Oral Daily Presley Barrera MD   40 mg at 11/21/24 1013    gabapentin (NEURONTIN) capsule 100 mg  100 mg Oral Q6H PRN Nikki Caceres MD   100 mg at 11/15/24 0418    hydrocortisone (CORTAID) 0.5 % cream   Topical Daily PRN Savi Chamorro MD        lisinopril (ZESTRIL) tablet 40 mg  40 mg Oral Daily Presley Barrera MD   40 mg at 11/21/24 1014    melatonin tablet 3 mg  3 mg Oral At Bedtime Bruce  MD Presley   3 mg at 11/21/24 1948    memantine (NAMENDA) tablet 5 mg  5 mg Oral Daily Savi Chamorro MD   5 mg at 11/21/24 1356    metoprolol succinate ER (TOPROL XL) 24 hr tablet 50 mg  50 mg Oral Daily Presley Barrera MD   50 mg at 11/21/24 1013    nicotine (NICODERM CQ) 14 MG/24HR 24 hr patch 1 patch  1 patch Transdermal Daily Evelia Grimm DO   1 patch at 11/21/24 1014    nicotine (NICODERM CQ) 21 MG/24HR 24 hr patch 1 patch  1 patch Transdermal Daily PRN Britt Kang MD   1 patch at 10/13/24 1611    nicotine (NICORETTE) gum 2 mg  2 mg Buccal Q1H PRN González Garcia MD   2 mg at 10/24/24 1254    OLANZapine (zyPREXA) tablet 5 mg  5 mg Oral TID PRN Evelia Grimm DO   5 mg at 11/21/24 0253    Or    OLANZapine (zyPREXA) injection 5 mg  5 mg Intramuscular TID PRN Evelia Grimm DO        OLANZapine zydis (zyPREXA) ODT half-tab 2.5 mg  2.5 mg Oral QAM Presley Barrera MD   2.5 mg at 11/21/24 1356    OLANZapine zydis (zyPREXA) ODT tab 5 mg  5 mg Oral At Bedtime González Garcia MD   5 mg at 11/21/24 1948    polyethylene glycol (MIRALAX) Packet 17 g  17 g Oral Daily PRN Nikki Caceres MD        traZODone (DESYREL) half-tab 25 mg  25 mg Oral At Bedtime PRN Evelia Grimm DO   25 mg at 11/21/24 0254    Or    traZODone (DESYREL) tablet 50 mg  50 mg Oral At Bedtime PRN Evelia Grimm DO         Labs and Imaging:  Data this admission:  - CBC unremarkable  - CMP unremarkable  - TSH normal  - UDS negative  - Vit D low  - Hgb A1c 5.9 (10/13/24)  - Lipids unremarkable  - Vit B12 normal  - Folate normal  - Urinalysis unremarkable  - EKG normal sinus rhythm, QTc 390 ms  - Head CT showed no acute changes  - HIV non reactive  - Treponema antibody non reactive  - ESR wnl   - Ceruloplasmin wnl   - BOOGIE negative  - Lyme negative      Parathyroid hormone:   10/13: 85     Calcium:  10/14: 11.4  10/16: 10.3  10/17: 10.3  10/19: 9.8  10/21: 10.6  10/23: 10.3     Albumin:  10/14: 4.3  10/16: 4.3  10/17: 4.1  10/19: 4.2  10/21:  "4.5  10/23: 4.3      CXR:   Impression:   1. No definite radiographic evidence of asbestosis. If clinically  indicated, consider evaluation with high resolution chest CT.  2. Nonspecific left costophrenic blunting. Given symmetrically low  lung volumes may be related to poor inspiratory effort/timing.  3. Pulmonary vascular congestion.     MRI:   IMPRESSION:  1. No acute intracranial pathology.  2. No focal lesion or structural abnormality.   3. Symmetric frontoparietal cortical volume loss slightly more than  expected for age.     Mental Status Exam:     Oriented to:  Grossly Oriented  General:  Awake and Alert  Appearance:  appears stated age, Grooming is adequate, and Dressed in his own clothes  Behavior/Attitude:  Calm and Easily distracted  Eye Contact: Appropriate for conversation  Psychomotor: No evidence of tics, dystonia, or tardive dyskinesia  no catatonia present  Speech:  appropriate volume/tone  Language: Fluent in English with appropriate syntax and vocabulary.  Mood:  \"Doing good\"  Affect:  congruent with mood  Thought Process:  disorganized and confused  Thought Content:   No SI/HI/ delusion of confusion  unable to assess AH/VH  Associations:  loose  Insight:  limited   Judgment:  limited   Impulse control: limited  Attention Span:   decreased  Concentration:   distractible  Recent and Remote Memory:   remote memory intact, recent memory impaired  Fund of Knowledge: average  Muscle Strength and Tone: normal  Gait and Station: Normal     Psychiatric Assessment     Estevan Aaron is a 64 year old male with previous psychiatric diagnoses of GEORGINA admitted from the ER on 10/12/2024 due to concern for HI and psychosis in the context of medical issues (hyperthyroidism, hypercalcemia), psychosocial stressors including with recent divorce and selling his home. This is the patient's first psychiatric hospitalization. Significant symptoms on admission included delusions of persecution with grandiose beliefs, " homicidal ideations, as well as disorganized thinking and behavior. The MSE on admission was pertinent for a thought process which was perseverative, circumstantial, tangential, disorganized and tangential; with rambling and looseness of associations. Psychological contributions to presentation included lack of insight. Social factors contributing to presentation included isolation, recent divorce, selling his house, and moving from hotel to hotel. Biological contributions to presentation included a history of hyperparathyroidism with chronic hypercalcemia per charts as well as a history of methamphetamine use per collateral from ex-wife.     History was difficult to obtain due to the patient's severe disorganized thinking on interview; he was observed with persecutory delusions pertaining to the realtor and gang members, disorganized behavior as these delusions have led him to flee to different hotels to stay safe/ has called  complaining of being targeted and auditory hallucinations of voices for the past 12 months. Per collateral from ex-wife, Santos has had paranoid ideations since they first met. He has always felt like people are out to get him or trying to rip him off. She says that he has had visual hallucinations (he will point things out that the rest of the family can't see) for a long time, but the family would just go along with him to avoid making him angry. This timeline and presentation could be consistent with diagnosis of paranoid personality disorder. Things began to worsen around the beginning of the COVID pandemic, when Santos became more isolated and the family started to notice cognitive issues such as impaired memory. Immediately prior to this hospitalization, the ex-wife found Santos in a hotel room with a generator full of gasoline, binoculars, and hunting equipment. This time course does not suggest acute psychosis, however given the patient has never had a full psychiatric work-up, we  completed a first-episode psychosis work-up.      Other things that could be contributing to his presentation is hyperparathyroidism with hypercalcemia. However, patient's calcium returned to normal limits on 10/16, and patient continues to have psychosis so likely not a significant contributing factor to patient's presentation.      Patient also continues to be disoriented and his behaviors appear to worsen in the evenings. This raised concern for underlying neurocognitive disorder with delirium. Patient received MRI brain with and without contrast which showed frontal and parietal atrophy greater than would be expected for patient's age. This is consistent with neurocognitive disorder. Patient has continued to improve with decreasing his antipsychotic. Working to get patient transferred to geriatric psychiatry in the short term and to memory care long term.      Given that he currently has psychosis, patient warrants inpatient psychiatric hospitalization to maintain his safety.     Psychiatric Plan by Diagnosis      Today's changes:  - None     # Major Neurocognitive Disorder  # Rule out paranoid personality disorder  1. Medications:  - Discontinued PTA fluoxetine 40 mg, consider restarting at a later time   - Olanzapine 2.5 mg during afternoon and 5 mg at bedtime  - Memantine 5 mg, daily  - Neurology consult 11/8/24, recommend outpatient follow-up and neuropsychiatric testing     2. Pertinent Labs/Monitoring:   - See above     3. Additional Plans:  - Patient will be treated in therapeutic milieu with appropriate individual and group therapies as described     # Unspecified anxiety vs Generalized Anxiety Disorder  - Monitor for symptoms.  - Fluoxetine held due to suspicion of ongoing manic symptoms     Psychiatric Hospital Course:      Estevan Aaron was admitted to [facility] on a 72 hour hold.   Medications:  PTA fluoxetine was held due to concern for worsening of zelalem   New medications started at the time of  admission include Zyprexa.   Increased olanzapine 10 mg at bedtime was to 10 mg BID (10/14)   Increased olanzapine 10 mg BID to 10 mg during day and 15 mg at bedtime (10/15)  10/21: increased olanzapine pm dose from 15 to 20 mg  10/23: started melatonin 3 mg at 7 pm to help patient with circadian rhythm as he has been staying up throughout the night and sleeping a lot during the day and there is some concern for delirium   10/24: consulted anesthesia for MRI brain   10/28: MRI brain complete, decrease AM olanzapine from 10 to 5 mg  10/29: Morning olanzapine decreased to 2.5 mg, evening olanzapine decreased to 15 mg   11/1: Decreased evening olanzapine to 10 mg   11/4: Decreased evening olanzapine to 7.5 mg   11/5: Decreased evening olanzapine to 5 mg   11/11: Moved morning dose of olanzapine to the afternoon as patient appears more agitated in the afternoons/evenings  11/21: Started memantine 5 mg daily      Care conference 10/18/24 with daughter Maria C and ex-wife Clifford  - Report that patient has always been paranoid since ex-wife first met him. She describes him always feeling like he is getting ripped off.   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 They reported once incidence in which he made a bid on a job and the client paid him, but he never ended up finishing the job  - Wife and him  officially this year, and since selling the house several months ago, patient has been moving from hotel to hotel due to thinking people are out to get him   - When they were selling their  house, patient threatened realtors and felt he was being ripped off   - Clifford reports that she started to be afraid to be around him alone  - When he was found in the hotel, he had a generator full of gasoline, binoculars, bullets, and hunting equipment.     10/21/24: updated daughter on Santos's treatment plan      10/23/24: updated daughter on Santos's treatment plan      10/25/24: updated daughter on Santos's treatment plan, including plan to try and get MRI brain done under sedation. She said that Santos's grandfather had dementia and his sister has a brain tumor.      10/28/24: updated daughter on Santos's MRI results. She is planning on coming into town soon.      Care conference 11/1/24 with daughters Maria C and Estefani and ex-wife Clifford  - Discussed dementia diagnosis   - Clifford asked about plans moving forward. Explained that applying for medical assistance is the first step. Explained that application processing time can be very variable. Informed family that Santos will most likely be staying on this inpatient unit during this time.   - Clifford expressed that their family would like to be the power of /have conservatorship for Santos.   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 Reports that he has bills that he has not paid.   - Maria C is planning to move to [place], and Clifford currently lives in [place]. Family prefer that Santos would in a facility that are near these locations. Discussed with family that they can also try to request a specific location (memory care).   - Clifford reports that he had previously gotten help applying for his social security and medicare, but unsure if it had been approved.   - Informed family that there is a geriatric unit and there might be a transfer if there becomes availability on this unit.   - Family shared that he was completely different just two months ago.   - Estefani shared that his family found his  medications in his old truck recently, expressed that it was likely that he was not taking his medications prior to his hospitalization.      11/6/24: updated Maria C on plan to try and transfer Santos to Geriatric unit.      11/11/24: updated Maria C on neurology consult      The risks, benefits, alternatives, and side effects were discussed and understood by the patient and other caregivers.     Medical Assessment and Plan     Medical diagnoses to be addressed this admission:    # Hypertension  - Continue PTA medications  Furosemide 40 mg daily  Lisinopril 40 mg daily  Metoprolol 50 mg daily  Amlodipine 10 mg daily  Clonidine 0.1 mg BID     # Hyperlipidemia  - Continue PTA Atorvastatin 40 mg     # Hyperparathyroidism  # Hypercalcemia, hypophosphoremia   Increased Ca level to 10.9 and decreased Ph level to 2.3 in the ED. Suspicion patient's hypercalcemia could be contributing to symptoms of psychosis.  - Consulted endocrinology, who started cinacalcet 30 mg BID on 10/13  - 10/16 endocrinology recommended continuing to trend calcium and albumin to make sure patient does not become hypocalcemic and recommended holding cinacalcet   - 10/17, calcium and albumin wnl, endo recommended cinacalcet 30 mg once daily   - 10/21, per endo continue cincaclcet and recheck calcium and albumin on October 24  - 10/23: per endo, patient needs to follow up outpatient for further management of hyperparathyroidism      Medical course: Patient was physically examined by the ED prior to being transferred to the unit and was found to be medically stable and appropriate for admission.      Consults: Psychiatry, Endocrinology (follow-up of hyperthyroidism / hypercalcemia and hypertension), neurology     Checklist     Legal Status: Committed     Safety Assessment:   Behavioral Orders   Procedures    Assault precautions    Code 1 - Restrict to Unit    Routine Programming     As clinically indicated    Status 15     Every 15 minutes.    Status  Individual Observation     1:1 during evening shift, & night shift.    If patient refuses overnight presence of staff in his room, it's ok to do 15 min checks through the window blinds.    Patient SIO status reviewed with team/RN.  Please also refer to RN/team documentation for add'l detail.    -SIO staff to monitor following which have contributed to patient being on SIO:  Pt has psychosis regarding being followed and attacked, very paranoid, HI    -Possible interventions SIO staff could use to support patient's treatment progress:  Redirect and reassure  -When following observed, team will review discontinuation of SIO:  Psychosis improves     Order Specific Question:   CONTINUOUS 24 hours / day     Answer:   Other     Order Specific Question:   Specify distance     Answer:   10 feet, 5 feet when close to the doors     Order Specific Question:   Indications for SIO     Answer:   Assault risk     Order Specific Question:   Indications for SIO     Answer:   Severe intrusiveness       Risk Assessment:  Risk for harm is elevated.  Risk factors: impulsive and past behaviors  Protective factors: family      Disposition: Pending stabilization, medication optimization, & development of a safe discharge plan.     Attestations     Resident without student: This patient was seen and discussed with my attending physician.  [facility]  [facility] Resident Physician    Attestation:  This patient has been seen and evaluated by me, Pete Smith MD.  I have discussed this patient with the house staff team including the resident and/or medical student and I agree with the findings and plan in this note.    I have reviewed today's vital signs, medications, labs and imaging. Pete Smith MD , PhD.

## 2024-11-22 NOTE — PLAN OF CARE
Pt has been visible in the milieu, he spends most of his time watching TV and is selectively social today. 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 Pt is med compliant with some encouragement. He denies all mental health symptoms upon assessment. No other concerns at this time.    Problem: Psychotic Signs/Symptoms  Goal: Optimal Cognitive Function (Psychotic Signs/Symptoms)  Outcome: Not Progressing   Goal Outcome Evaluation:    Plan of Care Reviewed With: patient

## 2024-11-22 NOTE — PLAN OF CARE
Problem: Sleep Disturbance  Goal: Adequate Sleep/Rest  Outcome: Progressing   Goal Outcome Evaluation:    Patient appears to have slept a total of 5.25 hours. Safety/environment checks conducted every 15 minutes with no concerns noted. No complaints of pain/discomfort.      Continues on SIO (10 feet, 5 feet when close to the doors ) for assault risk and severe intrusiveness.

## 2024-11-22 NOTE — PROGRESS NOTES
"  Rehab Group    Start time: 1115  End time: 1445  Patient time total: 35 minutes (billed for only 15 minutes d/t being the only pt in attendance for portions of session)    came in and out of group session     #3 attended   Group Type: music   Group Topic Covered: cognitive activities, coping skills, healthy leisure time, and problem solving     Group Session Detail: Participated in Music Therapy intervention of Music Apryl bowser.  Goals of session were focusing, memory recall, positive distraction, emotional containment and social cohesion.       Patient Response/Contribution:  worked intermittently, required encouragement to attend group, left group on several occasions, and required prompts or assistance to participate     Patient Detail: MT encountered Santos this afternoon when he was cleaning up coffee he spilled on the unit floor.  He was cooperative and organized in the cleaning up process, but presented as confused and disoriented overall.    With short, clear, prompts from Music Therapist he readily accepted invitation to come to session.     MT and pt walked down the marmolejo to group room.  Pt was pleasantly social, to the best of his ability.     Pt engaged with MT Bindarien card by crossing off the artists he knew (a more unorthodox way to play).  Showed poor memory recall for matching bands with songs played, but maintained calm affect.     At one point during session, a member of the housekeeping staff came by the change the trash.  \"She's a hard worker!\" Santos exclaimed.  \"I want to give her a shoulder rub!\"  Then Santos articulated and remembered how \"we can't touch people in the hospital.\"  MT affirmed.    Santos was in and out of session x 3, maintaining a 10-15 minute attention span at maximum, and within that time being easily distractible.     Santos asked MT about where she went to college, and MT returned the question. Santos stated he did not go to college, but worked \"on the job. Not easy.\"    Towards the end of " the afternoon, Santos went to check on the status of the floor where he spilled his coffee, and did not return to group after that.       Activity Therapy Per 15 min ()    Patient Active Problem List   Diagnosis    Hyperlipidemia LDL goal <130    Hypertension goal BP (blood pressure) < 130/80    Hypercalcemia    Hyperparathyroidism (H)    Thalassemia, unspecified type    Psychosis, unspecified psychosis type (H)    Acute psychosis (H)

## 2024-11-22 NOTE — PLAN OF CARE
BEH IP Unit Acuity Rating Score (UARS)  Patient is given one point for every criteria they meet.    CRITERIA SCORING   On a 72 hour hold, court hold, committed, stay of commitment, or revocation. 1    Patient LOS on BEH unit exceeds 20 days. 1  LOS: 41   Patient under guardianship, 55+, otherwise medically complex, or under age 11. 1   Suicide ideation without relief of precipitating factors. 0   Current plan for suicide. 0   Current plan for homicide. 0   Imminent risk or actual attempt to seriously harm another without relief of factors precipitating the attempt. 1   Severe dysfunction in daily living (ex: complete neglect for self care, extreme disruption in vegetative function, extreme deterioration in social interactions). 1   Recent (last 7 days) or current physical aggression in the ED or on unit. 0   Restraints or seclusion episode in past 72 hours. 0   Recent (last 7 days) or current verbal aggression, agitation, yelling, etc., while in the ED or unit. 0   Active psychosis. 1   Need for constant or near constant redirection (from leaving, from others, etc).  0   Intrusive or disruptive behaviors. 1   Patient requires 3 or more hours of individualized nursing care per 8-hour shift (i.e. for ADLs, meds, therapeutic interventions). 1   TOTAL 8

## 2024-11-23 ENCOUNTER — TELEPHONE (OUTPATIENT)
Dept: BEHAVIORAL HEALTH | Facility: CLINIC | Age: 64
End: 2024-11-23
Payer: COMMERCIAL

## 2024-11-23 PROCEDURE — 250N000013 HC RX MED GY IP 250 OP 250 PS 637

## 2024-11-23 PROCEDURE — 124N000002 HC R&B MH UMMC

## 2024-11-23 RX ADMIN — METOPROLOL SUCCINATE 50 MG: 50 TABLET, EXTENDED RELEASE ORAL at 08:58

## 2024-11-23 RX ADMIN — Medication 3 MG: at 21:09

## 2024-11-23 RX ADMIN — LISINOPRIL 40 MG: 10 TABLET ORAL at 08:58

## 2024-11-23 RX ADMIN — CLONIDINE HYDROCHLORIDE 0.1 MG: 0.1 TABLET ORAL at 21:13

## 2024-11-23 RX ADMIN — MEMANTINE 5 MG: 5 TABLET ORAL at 08:58

## 2024-11-23 RX ADMIN — OLANZAPINE 5 MG: 5 TABLET, ORALLY DISINTEGRATING ORAL at 21:14

## 2024-11-23 RX ADMIN — ATORVASTATIN CALCIUM 40 MG: 20 TABLET, FILM COATED ORAL at 08:58

## 2024-11-23 RX ADMIN — CINACALCET 30 MG: 30 TABLET ORAL at 08:58

## 2024-11-23 RX ADMIN — FUROSEMIDE 40 MG: 40 TABLET ORAL at 08:58

## 2024-11-23 RX ADMIN — CLONIDINE HYDROCHLORIDE 0.1 MG: 0.1 TABLET ORAL at 08:58

## 2024-11-23 RX ADMIN — AMLODIPINE BESYLATE 10 MG: 5 TABLET ORAL at 08:58

## 2024-11-23 RX ADMIN — OLANZAPINE 2.5 MG: 5 TABLET, ORALLY DISINTEGRATING ORAL at 15:28

## 2024-11-23 RX ADMIN — Medication 1 PATCH: at 08:58

## 2024-11-23 ASSESSMENT — ACTIVITIES OF DAILY LIVING (ADL)
ADLS_ACUITY_SCORE: 26
ORAL_HYGIENE: INDEPENDENT
ADLS_ACUITY_SCORE: 26
ADLS_ACUITY_SCORE: 0
ADLS_ACUITY_SCORE: 26
DRESS: INDEPENDENT
ADLS_ACUITY_SCORE: 26
DRESS: INDEPENDENT
ADLS_ACUITY_SCORE: 26
LAUNDRY: WITH SUPERVISION
ADLS_ACUITY_SCORE: 0
ORAL_HYGIENE: INDEPENDENT
HYGIENE/GROOMING: INDEPENDENT
ADLS_ACUITY_SCORE: 26
HYGIENE/GROOMING: INDEPENDENT
ADLS_ACUITY_SCORE: 26
LAUNDRY: WITH SUPERVISION
ADLS_ACUITY_SCORE: 26

## 2024-11-23 NOTE — TELEPHONE ENCOUNTER
R: [place]  Access Inpatient Bed Call Log 11/23/24 8:20 AM    Intake has called facilities that have not updated the bed status within the last 12 hours.                                       [facility] is at capacity.             Pt remains on the work list pending appropriate bed availability.

## 2024-11-23 NOTE — PLAN OF CARE
Patient appeared mildly confused, but less disorganized this shift. He socialized appropriately with peers and staff and  all psych symptoms. He was present in the milieu through the shift  with no significant intrusive behavior noted. He hydrated well, ate 100 % of his dinner and was medication compliant.     Problem: Psychotic Signs/Symptoms  Goal: Improved Behavioral Control (Psychotic Signs/Symptoms)  Outcome: Progressing  Intervention: Manage Behavior  Recent Flowsheet Documentation  Taken 11/23/2024 1625 by Pool Urbina RN  De-Escalation Techniques: verbally redirected     Problem: Anxiety  Goal: Anxiety Reduction or Resolution  Outcome: Progressing  Intervention: Promote Anxiety Reduction  Recent Flowsheet Documentation  Taken 11/23/2024 1625 by Pool Urbina, RN  Supportive Measures:   verbalization of feelings encouraged   positive reinforcement provided   active listening utilized   self-care encouraged   self-reflection promoted   self-responsibility promoted   relaxation techniques promoted  Family/Support System Care:   involvement promoted   self-care encouraged   presence promoted   support provided   Goal Outcome Evaluation:    Plan of Care Reviewed With: patient                    Statement Selected

## 2024-11-23 NOTE — TELEPHONE ENCOUNTER
[facility] ONLY: 3B Only  R: [place] MH Access Inpatient Bed Call Log  11/23/24 @ 1:00am    [facility]: @ capacity for Adult MH beds.     Pt remains on waitlist pending appropriate placement availability.

## 2024-11-23 NOTE — PLAN OF CARE
Pt has been visible in the milieu, selectively social with peers. He is overall disorganized and gets confused easily during conversation. He was becoming irritable when trying to dial phone numbers and insisting that he needed to be picked up to go home. Pt has not had any significant behavioral escalation and was generally easily redirectable. Pt denies all mental health symptoms upon assessment. Pt was medication compliant, though dumped some of his pills into his water cup this morning. Pt hygiene is poor, he was encouraged to shower and was eventually compliant.    Problem: Psychotic Signs/Symptoms  Goal: Optimal Cognitive Function (Psychotic Signs/Symptoms)  Outcome: Not Progressing   Goal Outcome Evaluation:    Plan of Care Reviewed With: patient

## 2024-11-23 NOTE — TELEPHONE ENCOUNTER
R: 8:38AM-Zayda reports they are at capacity for SIOs.  Unable to accommodate Pt at this time.      [facility] Access Inpatient Bed Call Log 11/23/24 8:40AM    Intake has called facilities that have not updated the bed status within the last 12 hours.                             [facility] is at capacity.             Pt remains on the work list pending appropriate bed availability.

## 2024-11-23 NOTE — PLAN OF CARE
Problem: Sleep Disturbance  Goal: Adequate Sleep/Rest  Outcome: Progressing   Goal Outcome Evaluation:    Patient appears to have slept a total of 6 hours.     At times suddenly irritable and argumentative with SIO staff. Redirectable after repeated attempts.   Safety/environment checks conducted every 15 minutes with no further concerns noted. No complaints of pain/discomfort.      Continues on SIO (10 feet, 5 feet when close to the doors ) for assault risk and severe intrusiveness.

## 2024-11-24 ENCOUNTER — TELEPHONE (OUTPATIENT)
Dept: BEHAVIORAL HEALTH | Facility: CLINIC | Age: 64
End: 2024-11-24
Payer: COMMERCIAL

## 2024-11-24 PROCEDURE — 250N000013 HC RX MED GY IP 250 OP 250 PS 637

## 2024-11-24 PROCEDURE — 124N000002 HC R&B MH UMMC

## 2024-11-24 RX ADMIN — METOPROLOL SUCCINATE 50 MG: 50 TABLET, EXTENDED RELEASE ORAL at 08:24

## 2024-11-24 RX ADMIN — LISINOPRIL 40 MG: 10 TABLET ORAL at 08:24

## 2024-11-24 RX ADMIN — CLONIDINE HYDROCHLORIDE 0.1 MG: 0.1 TABLET ORAL at 08:24

## 2024-11-24 RX ADMIN — MEMANTINE 5 MG: 5 TABLET ORAL at 08:24

## 2024-11-24 RX ADMIN — ATORVASTATIN CALCIUM 40 MG: 20 TABLET, FILM COATED ORAL at 08:24

## 2024-11-24 RX ADMIN — CLONIDINE HYDROCHLORIDE 0.1 MG: 0.1 TABLET ORAL at 20:23

## 2024-11-24 RX ADMIN — Medication 3 MG: at 20:22

## 2024-11-24 RX ADMIN — AMLODIPINE BESYLATE 10 MG: 5 TABLET ORAL at 08:24

## 2024-11-24 RX ADMIN — OLANZAPINE 5 MG: 5 TABLET, ORALLY DISINTEGRATING ORAL at 20:23

## 2024-11-24 RX ADMIN — Medication 1 PATCH: at 08:23

## 2024-11-24 RX ADMIN — OLANZAPINE 5 MG: 5 TABLET, FILM COATED ORAL at 04:36

## 2024-11-24 RX ADMIN — OLANZAPINE 2.5 MG: 5 TABLET, ORALLY DISINTEGRATING ORAL at 13:16

## 2024-11-24 RX ADMIN — FUROSEMIDE 40 MG: 40 TABLET ORAL at 08:24

## 2024-11-24 RX ADMIN — CINACALCET 30 MG: 30 TABLET ORAL at 08:24

## 2024-11-24 ASSESSMENT — ACTIVITIES OF DAILY LIVING (ADL)
ADLS_ACUITY_SCORE: 0
LAUNDRY: WITH SUPERVISION
ADLS_ACUITY_SCORE: 0
DRESS: PROMPTS;INDEPENDENT
ADLS_ACUITY_SCORE: 0
ORAL_HYGIENE: PROMPTS;INDEPENDENT
ADLS_ACUITY_SCORE: 0
ADLS_ACUITY_SCORE: 0
HYGIENE/GROOMING: INDEPENDENT;PROMPTS
ADLS_ACUITY_SCORE: 0

## 2024-11-24 NOTE — PLAN OF CARE
Problem: Psychotic Signs/Symptoms  Goal: Improved Behavioral Control (Psychotic Signs/Symptoms)  Outcome: Progressing  Intervention: Manage Behavior  Recent Flowsheet Documentation  Taken 11/24/2024 1700 by Pool Urbina RN  De-Escalation Techniques: verbally redirected   Goal Outcome Evaluation:    Plan of Care Reviewed With: patient      Quite an uneventful night for patient. He slept in the lounge area through out the shift, and was quiet and withdrawn to self when awake.. He denied all mental health symptoms, but still appeared disorganized and confused. He initially refused medications and was mildly irritable when writer tried to convince him to take them. His vitals were normal and he ate and hydrated well. Overall, patient was calm and pleasant.

## 2024-11-24 NOTE — TELEPHONE ENCOUNTER
R: 8PM- Pt wants 3B only.  3B is unable to accommodate d/t acuity.    Pt remains on waitlist pending appropriate bed availability.

## 2024-11-24 NOTE — PLAN OF CARE
Pt has been intermittently visible in the milieu, has been overall socially withdrawn and quiet today. Pt denies all mental health symptoms upon assessment. Medication compliant, no PRNs received. Pt continues to present as disorganized, no behavioral outbursts this shift. Pt entered peer's room and attempted to use their bathroom, redirected by staff. Hygiene and intake are adequate, no other concerns at this time.    Problem: Psychotic Signs/Symptoms  Goal: Optimal Cognitive Function (Psychotic Signs/Symptoms)  Outcome: Not Progressing   Goal Outcome Evaluation:    Plan of Care Reviewed With: patient

## 2024-11-24 NOTE — PLAN OF CARE
"  Problem: Sleep Disturbance  Goal: Adequate Sleep/Rest  Outcome: Progressing     Problem: Pain Acute  Goal: Optimal Pain Control and Function  Intervention: Develop Pain Management Plan  Recent Flowsheet Documentation  Taken 11/24/2024 0145 by Sirena Man RN  Pain Management Interventions: declines   Goal Outcome Evaluation:    Patient appears to have slept a total of 3.75 hours.     Complained of pain in leg and right hip area. Declined PRN medication but accepted heat pack. Later also complained about edema in his feet (states it's worse in left feet) and was advised to elevate his feet in bed instead of sleeping in the chair in the lounge. Large compression socks ordered and applied.      Around 0415 patient suddenly woke up and became irritable, yelling and accusing his SIO attendant of stealing from him. Patient difficult to redirect from this and continued to make paranoid statements: \"She's a swindler!\" ; \"She stole $70,000 from me!\" \"Where is my truck?!\" ; \"You guys are all going to long-term!\"     Given oral PRN Zyprexa (5 mg) for agitation. Presented with calmer affect afterwards and was sleeping on later assessment. On-call psychiatrist notified per orders.     Safety/environment checks conducted every 15 minutes with no further concerns noted.   "

## 2024-11-24 NOTE — TELEPHONE ENCOUNTER
R: 5pm- Bed search: 3B only at [facility].     [facility] is at capacity.     Pt remains on worklist pending appropriate bed availability.

## 2024-11-25 ENCOUNTER — DOCUMENTATION ONLY (OUTPATIENT)
Dept: OTHER | Facility: CLINIC | Age: 64
End: 2024-11-25
Payer: COMMERCIAL

## 2024-11-25 ENCOUNTER — TELEPHONE (OUTPATIENT)
Dept: BEHAVIORAL HEALTH | Facility: CLINIC | Age: 64
End: 2024-11-25
Payer: COMMERCIAL

## 2024-11-25 PROCEDURE — 250N000013 HC RX MED GY IP 250 OP 250 PS 637

## 2024-11-25 PROCEDURE — 124N000002 HC R&B MH UMMC

## 2024-11-25 PROCEDURE — H2032 ACTIVITY THERAPY, PER 15 MIN: HCPCS

## 2024-11-25 PROCEDURE — 99231 SBSQ HOSP IP/OBS SF/LOW 25: CPT | Mod: GC | Performed by: PSYCHIATRY & NEUROLOGY

## 2024-11-25 RX ADMIN — OLANZAPINE 5 MG: 5 TABLET, ORALLY DISINTEGRATING ORAL at 19:16

## 2024-11-25 RX ADMIN — ATORVASTATIN CALCIUM 40 MG: 20 TABLET, FILM COATED ORAL at 08:54

## 2024-11-25 RX ADMIN — OLANZAPINE 2.5 MG: 5 TABLET, ORALLY DISINTEGRATING ORAL at 15:20

## 2024-11-25 RX ADMIN — AMLODIPINE BESYLATE 10 MG: 5 TABLET ORAL at 08:54

## 2024-11-25 RX ADMIN — FUROSEMIDE 40 MG: 40 TABLET ORAL at 08:55

## 2024-11-25 RX ADMIN — METOPROLOL SUCCINATE 50 MG: 50 TABLET, EXTENDED RELEASE ORAL at 08:54

## 2024-11-25 RX ADMIN — CINACALCET 30 MG: 30 TABLET ORAL at 08:54

## 2024-11-25 RX ADMIN — CLONIDINE HYDROCHLORIDE 0.1 MG: 0.1 TABLET ORAL at 19:16

## 2024-11-25 RX ADMIN — Medication 3 MG: at 19:16

## 2024-11-25 RX ADMIN — LISINOPRIL 40 MG: 10 TABLET ORAL at 08:54

## 2024-11-25 RX ADMIN — CLONIDINE HYDROCHLORIDE 0.1 MG: 0.1 TABLET ORAL at 08:54

## 2024-11-25 RX ADMIN — MEMANTINE 5 MG: 5 TABLET ORAL at 08:54

## 2024-11-25 RX ADMIN — Medication 1 PATCH: at 09:16

## 2024-11-25 ASSESSMENT — ACTIVITIES OF DAILY LIVING (ADL)
HYGIENE/GROOMING: INDEPENDENT;PROMPTS
ORAL_HYGIENE: INDEPENDENT;PROMPTS
LAUNDRY: WITH SUPERVISION
ADLS_ACUITY_SCORE: 74
ADLS_ACUITY_SCORE: 0
ADLS_ACUITY_SCORE: 74
ADLS_ACUITY_SCORE: 0
ADLS_ACUITY_SCORE: 85
ADLS_ACUITY_SCORE: 0
ADLS_ACUITY_SCORE: 0
ADLS_ACUITY_SCORE: 85
ADLS_ACUITY_SCORE: 0
ADLS_ACUITY_SCORE: 85
ADLS_ACUITY_SCORE: 0
ADLS_ACUITY_SCORE: 74
ADLS_ACUITY_SCORE: 0
ADLS_ACUITY_SCORE: 85
ADLS_ACUITY_SCORE: 0
ADLS_ACUITY_SCORE: 0
DRESS: INDEPENDENT;PROMPTS
ADLS_ACUITY_SCORE: 74
ADLS_ACUITY_SCORE: 85
ADLS_ACUITY_SCORE: 85

## 2024-11-25 NOTE — TELEPHONE ENCOUNTER
[facility], 3B ONLY     R: [place] MH Access Inpatient Bed Call Log 11/25/24 7:46 AM   Intake has called facilities that have not updated the bed status within the last 12 hours.                          [facility] is posting 0 beds.                     Pt remains on the work list pending appropriate bed availability.          12:05 PM Per call to , CRN requesting NM to messaged about pt. Not appropriate for  at this time.   12:11 PM Messaged NM Brendan via TEAMS chat.

## 2024-11-25 NOTE — TELEPHONE ENCOUNTER
[facility] ONLY: 3B UNIT ONLY  R: [place] MH Access Inpatient Bed Call Log  11/25/24 @ 1:00am    [facility]: @ capacity for Adult MH beds.     Pt remains on waitlist pending appropriate placement availability.

## 2024-11-25 NOTE — PLAN OF CARE
"Pt has been repeatly coming to staff asking for his truck. Pt states its downstairs.  Pt wanting his wallet and phone. Explained to pt these things were given to his family. Pt not accepting this information.  Pt asked a male pt to come to his room.  Pt redirected.  Pt has been confused and irritable at times.  Pt took his medications without issues.  Pt bene out of his room most of the shift,  Pt denies SI.    Problem: Adult Behavioral Health Plan of Care  Goal: Plan of Care Review  Outcome: Not Progressing  Goal: Patient-Specific Goal (Individualization)  Description: You can add care plan individualizations to a care plan. Examples of Individualization might be:  \"Parent requests to be called daily at 9am for status\", \"I have a hard time hearing out of my right ear\", or \"Do not touch me to wake me up as it startles  me\".  Outcome: Not Progressing  Goal: Adheres to Safety Considerations for Self and Others  Outcome: Not Progressing  Goal: Absence of New-Onset Illness or Injury  Outcome: Not Progressing  Goal: Optimized Coping Skills in Response to Life Stressors  Outcome: Not Progressing  Goal: Develops/Participates in Therapeutic Richardson to Support Successful Transition  Outcome: Not Progressing     Problem: Psychotic Signs/Symptoms  Goal: Improved Behavioral Control (Psychotic Signs/Symptoms)  Outcome: Not Progressing  Goal: Optimal Cognitive Function (Psychotic Signs/Symptoms)  Outcome: Not Progressing  Goal: Increased Participation and Engagement (Psychotic Signs/Symptoms)  Outcome: Not Progressing  Goal: Improved Mood Symptoms (Psychotic Signs/Symptoms)  Outcome: Not Progressing  Goal: Improved Psychomotor Symptoms (Psychotic Signs/Symptoms)  Outcome: Not Progressing  Goal: Decreased Sensory Symptoms (Psychotic Signs/Symptoms)  Outcome: Not Progressing  Goal: Improved Sleep (Psychotic Signs/Symptoms)  Outcome: Not Progressing  Goal: Enhanced Social, Occupational or Functional Skills (Psychotic " Signs/Symptoms)  Outcome: Not Progressing     Problem: Depression  Goal: Improved Mood  Outcome: Not Progressing     Problem: Suicidal Behavior  Goal: Suicidal Behavior is Absent or Managed  Outcome: Not Progressing     Problem: Anxiety  Goal: Anxiety Reduction or Resolution  Outcome: Not Progressing     Problem: Sleep Disturbance  Goal: Adequate Sleep/Rest  Outcome: Not Progressing     Problem: Seclusion/Restraint, Behavioral  Goal: Seclusion/Behavioral Restraint Goal: Absence of Harm or Injury  Outcome: Not Progressing     Problem: Pain Acute  Goal: Optimal Pain Control and Function  Outcome: Not Progressing   Goal Outcome Evaluation:

## 2024-11-25 NOTE — PLAN OF CARE
The pt was visible in the milieu for most of the shift, watching TV and socializing with selected peers. The pt presented as calm to irritable in mood, with a flat affect. Occasionally, the pt exhibited tangential speech and minimally disorganized, making delusional statements about candy given on the unit tapered with drugs, and expressed suspicious thoughts. The pt minimally receptive to staff redirection but was verbally able to accept it. No behavioral escalations or safety concerns were observed or noted. Appetite was good and fluid intake was adequate. The pt complied with med and care.    Problem: Psychotic Signs/Symptoms  Goal: Improved Mood Symptoms (Psychotic Signs/Symptoms)  Outcome: Progressing  Intervention: Optimize Emotion and Mood  Recent Flowsheet Documentation  Taken 11/25/2024 1630 by Jarrod Keenan, RN  Supportive Measures:   active listening utilized                       self-care encouraged   self-reflection promoted                     self-responsibility promoted   verbalization of feelings encouraged  Diversional Activity:   television   art work   Goal Outcome Evaluation:    Plan of Care Reviewed With: patient

## 2024-11-25 NOTE — PLAN OF CARE
Team Note Due:  Monday    Assessment/Intervention/Current Symtoms and Care Coordination:  Chart review and met with team, discussed pt progress, symptomology, and response to treatment.  Discussed the discharge plan and any potential impediments to discharge. Clifford Aaron is currently emergency guardian/conservator until 01/20/2025.    Emergency guardianship/conservatorship documents sent to [facility]. Documents also scanned into pt's chart.    Discharge Plan or Goal:  Memory care facility     Barriers to Discharge:  Patient requires further psychiatric stabilization due to current symptomology, medication management with changes subject to provider, coordination with outside supports, and aftercare planning. Pt is under civil commitment.     Referral Status:  None at this time    Family would like to consider the following Memory Care Facilities:  [facility] ([place]) - have a few units available as of 11/19. Requires 2 years of private pay before accepting MA (limited availability for ).  [facility] ([place]) - vm left 11/18  [facility] ([place]) - has studio apt available as of 11/18. Requires 2 years of private pay before accepting MA.     Legal Status:  MI Commitment with [facility]  File Number: [id number]  Start and expiration date of commitment: 10/24/24 - 04/24/25    [facility] meds: Haldol, Clozaril, Risperdal, Invega, Zyprexa, Seroquel, Abilify    PPS/CM:  Shelby Chowdary: [phone]  [email or web address]    Contacts:  Maria C Aaron (Daughter): [phone]   Clifford Aaron (ex-wife): [phone]     No Del Angel (guardianship/conservatorship ): [phone]  [email or web address]     Upcoming Meetings and Dates/Important Information and next steps:  Follow up with family regarding list of Memory Care facilities  Discharge planning when appropriate  Pt does not qualify for MA per financial counselor on 11/8/2024. Pt will  likely privately pay for Memory Care until he qualifies for MA/waivers in the future.  Pt will need to apply for MA before a [employer] Assessment/SMRT can be completed for waivers.    Provisional Discharge and Change of Status needed at discharge

## 2024-11-25 NOTE — PROGRESS NOTES
"Rehab Group     Start time: 1015  End time: 1200  Patient time total: 45 minutes     attended partial group     #4 attended   Group Type: music   Group Topic Covered: emotional regulation, healthy leisure time, mindfulness, self-care, and self-esteem      Group Session Detail: Therapeutic Group Music Listening Clinic      Patient Response/Contribution:  cooperative to the best of his ability,  perseverative on one topic    Patient Detail:  Pt was perseverative on trying to \"find my truck - it's a Meka\" ... Throughout the morning.  Pt did participate in 45 minutes of session.  Pt is well accepted by peers.  Pt shared about a [employer] concert he had been to while group listened to \"[employer]\".   Pt appears to have intact long-term memory, but presents with some confusion in the present.         Activity Therapy Per 15 min ()    Patient Active Problem List   Diagnosis    Hyperlipidemia LDL goal <130    Hypertension goal BP (blood pressure) < 130/80    Hypercalcemia    Hyperparathyroidism (H)    Thalassemia, unspecified type    Psychosis, unspecified psychosis type (H)    Acute psychosis (H)       "

## 2024-11-25 NOTE — PLAN OF CARE
BEH IP Unit Acuity Rating Score (UARS)  Patient is given one point for every criteria they meet.    CRITERIA SCORING   On a 72 hour hold, court hold, committed, stay of commitment, or revocation. 1    Patient LOS on BEH unit exceeds 20 days. 1  LOS: 44   Patient under guardianship, 55+, otherwise medically complex, or under age 11. 1   Suicide ideation without relief of precipitating factors. 0   Current plan for suicide. 0   Current plan for homicide. 0   Imminent risk or actual attempt to seriously harm another without relief of factors precipitating the attempt. 1   Severe dysfunction in daily living (ex: complete neglect for self care, extreme disruption in vegetative function, extreme deterioration in social interactions). 1   Recent (last 7 days) or current physical aggression in the ED or on unit. 0   Restraints or seclusion episode in past 72 hours. 0   Recent (last 7 days) or current verbal aggression, agitation, yelling, etc., while in the ED or unit. 0   Active psychosis. 1   Need for constant or near constant redirection (from leaving, from others, etc).  0   Intrusive or disruptive behaviors. 1   Patient requires 3 or more hours of individualized nursing care per 8-hour shift (i.e. for ADLs, meds, therapeutic interventions). 1   TOTAL 8

## 2024-11-25 NOTE — TELEPHONE ENCOUNTER
R: 5:57pm- Pt wants [facility] only.  3B.     [place]  Access Inpatient Bed Call Log 11/25/24   Intake has called facilities that have not updated the bed status within the last 12 hours.                          [facility] is posting 0 beds.                     Pt remains on the work list pending appropriate bed availability.

## 2024-11-25 NOTE — PROGRESS NOTES
"  ----------------------------------------------------------------------------------------------------------  [facility]  Psychiatry Progress Note  Hospital Day #44     Interim History:     The patient's care was discussed with the treatment team and chart notes were reviewed.    Vitals: Low HR of 54  Sleep: 3.5 hours (11/25/24 0600)  Scheduled medications: Took all scheduled medications as prescribed  Psychiatric PRN medications:   None  Staff Report:   Pt has been intermittently visible in the milieu.   Pt denies all mental health symptoms upon assessment.   Medication compliant, no PRNs received.   Pt continues to present as disorganized, no behavioral outbursts this shift.   Pt entered peer's room and attempted to use their bathroom, redirected by staff.     - Please see staff notes for details.      Subjective:     Patient Interview:  Patient wanted to show medical students his MRI. Said there was light in his brain that allowed the MRI. Patient stated he was doing well. Believed that everyone in the enriquez was having fun together and couldn't move on because they were doing construction upstairs. Didn't know it was November. Upon prompting he knew he was in a medical facility. Excited for Superbowl. Believed [employer]  could not hold under pressure. Slept well. No chest pain or dizziness. Doesn't know when medications are given to him. No new side effects noticed. Feels safe on the unit. No visual or auditory hallucinations. Does not believed anyone is out to get him. Would state random sentences \"cleaning hunting slug\" and asked \"if we have three stop bells in the repertoire\" and \"wants to grab one of his three cars\" and \"six or seven people looking for his Meka\" and \"what a bullwax is.\" Said that he has \"problems with Mexicans.\" Does not want to hurt anyone. States that he is good to go.    ROS:  See above     Objective:     Vitals:  /82   Pulse 65   " Temp 97.8  F (36.6  C) (Oral)   Resp 18   Wt 107.3 kg (236 lb 9.6 oz)   SpO2 96%   BMI 38.19 kg/m      Allergies:  No Known Allergies    Current Medications:  Scheduled:  Current Facility-Administered Medications   Medication Dose Route Frequency Provider Last Rate Last Admin    acetaminophen (TYLENOL) tablet 650 mg  650 mg Oral Q4H PRN Nikki Caceres MD   650 mg at 11/14/24 0613    alum & mag hydroxide-simethicone (MAALOX) suspension 30 mL  30 mL Oral Q4H PRN Nikki Caceres MD   30 mL at 10/17/24 0837    amLODIPine (NORVASC) tablet 10 mg  10 mg Oral Daily Presley Barrera MD   10 mg at 11/24/24 0824    atorvastatin (LIPITOR) tablet 40 mg  40 mg Oral Daily Nikki Caceres MD   40 mg at 11/24/24 0824    cholecalciferol (VITAMIN D3) capsule 1,250 mcg  1,250 mcg Oral Q7 Days Evelia Grimm DO   1,250 mcg at 11/19/24 1259    cinacalcet (SENSIPAR) tablet 30 mg  30 mg Oral Daily Presley Barrera MD   30 mg at 11/24/24 0824    cloNIDine (CATAPRES) tablet 0.1 mg  0.1 mg Oral BID Presley Barrera MD   0.1 mg at 11/24/24 2023    furosemide (LASIX) tablet 40 mg  40 mg Oral Daily Presley Barrera MD   40 mg at 11/24/24 0824    gabapentin (NEURONTIN) capsule 100 mg  100 mg Oral Q6H PRN Nikki Caecres MD   100 mg at 11/15/24 0418    hydrocortisone (CORTAID) 0.5 % cream   Topical Daily PRN Savi Chamorro MD        lisinopril (ZESTRIL) tablet 40 mg  40 mg Oral Daily Presley Barrera MD   40 mg at 11/24/24 0824    melatonin tablet 3 mg  3 mg Oral At Bedtime Presley Barrera MD   3 mg at 11/24/24 2022    memantine (NAMENDA) tablet 5 mg  5 mg Oral Daily Savi Chamorro MD   5 mg at 11/24/24 0824    metoprolol succinate ER (TOPROL XL) 24 hr tablet 50 mg  50 mg Oral Daily Presley Barrera MD   50 mg at 11/24/24 0824    nicotine (NICODERM CQ) 14 MG/24HR 24 hr patch 1 patch  1 patch Transdermal Daily Evelia Grimm DO   1 patch at 11/24/24 0823    nicotine (NICODERM CQ) 21 MG/24HR 24 hr patch 1 patch  1 patch  Transdermal Daily PRN Britt Kang MD   1 patch at 10/13/24 1611    nicotine (NICORETTE) gum 2 mg  2 mg Buccal Q1H PRN González Garcia MD   2 mg at 10/24/24 1254    OLANZapine (zyPREXA) tablet 5 mg  5 mg Oral TID PRN Evelia Grimm DO   5 mg at 11/24/24 0436    Or    OLANZapine (zyPREXA) injection 5 mg  5 mg Intramuscular TID PRN Evelia Grimm DO        OLANZapine zydis (zyPREXA) ODT half-tab 2.5 mg  2.5 mg Oral QAM Presley Barrera MD   2.5 mg at 11/24/24 1316    OLANZapine zydis (zyPREXA) ODT tab 5 mg  5 mg Oral At Bedtime González Garcia MD   5 mg at 11/24/24 2023    polyethylene glycol (MIRALAX) Packet 17 g  17 g Oral Daily PRN Nikki Caceres MD        traZODone (DESYREL) half-tab 25 mg  25 mg Oral At Bedtime PRN Evelia Grimm DO   25 mg at 11/21/24 0254    Or    traZODone (DESYREL) tablet 50 mg  50 mg Oral At Bedtime PRN Evelia Grimm DO           PRN:  Current Facility-Administered Medications   Medication Dose Route Frequency Provider Last Rate Last Admin    acetaminophen (TYLENOL) tablet 650 mg  650 mg Oral Q4H PRN Nikki Caceres MD   650 mg at 11/14/24 0613    alum & mag hydroxide-simethicone (MAALOX) suspension 30 mL  30 mL Oral Q4H PRN Nikki Caceres MD   30 mL at 10/17/24 0837    amLODIPine (NORVASC) tablet 10 mg  10 mg Oral Daily Presley Barrera MD   10 mg at 11/24/24 0824    atorvastatin (LIPITOR) tablet 40 mg  40 mg Oral Daily Nikki Caceres MD   40 mg at 11/24/24 0824    cholecalciferol (VITAMIN D3) capsule 1,250 mcg  1,250 mcg Oral Q7 Days Evelia Grimm DO   1,250 mcg at 11/19/24 1259    cinacalcet (SENSIPAR) tablet 30 mg  30 mg Oral Daily Presley Barrera MD   30 mg at 11/24/24 0824    cloNIDine (CATAPRES) tablet 0.1 mg  0.1 mg Oral BID Presley Barrera MD   0.1 mg at 11/24/24 2023    furosemide (LASIX) tablet 40 mg  40 mg Oral Daily Presley Barrera MD   40 mg at 11/24/24 0824    gabapentin (NEURONTIN) capsule 100 mg  100 mg Oral Q6H PRN Nikki Caceres MD   100 mg at 11/15/24  0418    hydrocortisone (CORTAID) 0.5 % cream   Topical Daily PRN Savi Chamorro MD        lisinopril (ZESTRIL) tablet 40 mg  40 mg Oral Daily Presley Barrera MD   40 mg at 11/24/24 0824    melatonin tablet 3 mg  3 mg Oral At Bedtime Presley Barrera MD   3 mg at 11/24/24 2022    memantine (NAMENDA) tablet 5 mg  5 mg Oral Daily Savi Chamorro MD   5 mg at 11/24/24 0824    metoprolol succinate ER (TOPROL XL) 24 hr tablet 50 mg  50 mg Oral Daily Presley Barrera MD   50 mg at 11/24/24 0824    nicotine (NICODERM CQ) 14 MG/24HR 24 hr patch 1 patch  1 patch Transdermal Daily Evelia Grimm DO   1 patch at 11/24/24 0823    nicotine (NICODERM CQ) 21 MG/24HR 24 hr patch 1 patch  1 patch Transdermal Daily PRN Britt Kang MD   1 patch at 10/13/24 1611    nicotine (NICORETTE) gum 2 mg  2 mg Buccal Q1H PRN González Garcia MD   2 mg at 10/24/24 1254    OLANZapine (zyPREXA) tablet 5 mg  5 mg Oral TID PRN Evelia Grimm DO   5 mg at 11/24/24 0436    Or    OLANZapine (zyPREXA) injection 5 mg  5 mg Intramuscular TID PRN Evelia Grimm DO        OLANZapine zydis (zyPREXA) ODT half-tab 2.5 mg  2.5 mg Oral QAM Presley Barrera MD   2.5 mg at 11/24/24 1316    OLANZapine zydis (zyPREXA) ODT tab 5 mg  5 mg Oral At Bedtime González Garcia MD   5 mg at 11/24/24 2023    polyethylene glycol (MIRALAX) Packet 17 g  17 g Oral Daily PRN Nikki Caceres MD        traZODone (DESYREL) half-tab 25 mg  25 mg Oral At Bedtime PRN Evelia Grimm DO   25 mg at 11/21/24 0254    Or    traZODone (DESYREL) tablet 50 mg  50 mg Oral At Bedtime PRN Evelia Grimm DO         Labs and Imaging:  Data this admission:  - CBC unremarkable  - CMP unremarkable  - TSH normal  - UDS negative  - Vit D low  - Hgb A1c 5.9 (10/13/24)  - Lipids unremarkable  - Vit B12 normal  - Folate normal  - Urinalysis unremarkable  - EKG normal sinus rhythm, QTc 390 ms  - Head CT showed no acute changes  - HIV non reactive  - Treponema antibody non reactive  - ESR wnl   -  "Ceruloplasmin wnl   - BOOGIE negative  - Lyme negative      Parathyroid hormone:   10/13: 85     Calcium:  10/14: 11.4  10/16: 10.3  10/17: 10.3  10/19: 9.8  10/21: 10.6  10/23: 10.3     Albumin:  10/14: 4.3  10/16: 4.3  10/17: 4.1  10/19: 4.2  10/21: 4.5  10/23: 4.3      CXR:   Impression:   1. No definite radiographic evidence of asbestosis. If clinically  indicated, consider evaluation with high resolution chest CT.  2. Nonspecific left costophrenic blunting. Given symmetrically low  lung volumes may be related to poor inspiratory effort/timing.  3. Pulmonary vascular congestion.     MRI:   IMPRESSION:  1. No acute intracranial pathology.  2. No focal lesion or structural abnormality.   3. Symmetric frontoparietal cortical volume loss slightly more than  expected for age.     Mental Status Exam:     Oriented to: Oriented to self but not time and place  General:  Awake and Alert  Appearance:  appears stated age, Grooming is adequate, and Dressed in his own clothes  Behavior/Attitude:  Calm and Easily distracted  Eye Contact: Appropriate for conversation  Psychomotor: No evidence of tics, dystonia, or tardive dyskinesia  no catatonia present  Speech:  appropriate volume/tone  Language: Fluent in English with appropriate syntax and vocabulary.  Mood:  \"good\"  Affect:  congruent with mood  Thought Process:  disorganized and confused  Thought Content:   No SI/HI/ delusion of confusion  Denies AH/VH.  Associations:  loose  Insight:  limited   Judgment:  limited   Impulse control: limited  Attention Span:   decreased  Concentration:   distractible  Recent and Remote Memory:   remote memory intact, recent memory impaired  Fund of Knowledge: average  Muscle Strength and Tone: normal  Gait and Station: Normal     Psychiatric Assessment     Estevan Aaron is a 64 year old male with previous psychiatric diagnoses of GEORGINA admitted from the ER on 10/12/2024 due to concern for HI and psychosis in the context of medical issues " (hyperthyroidism, hypercalcemia), psychosocial stressors including with recent divorce and selling his home. This is the patient's first psychiatric hospitalization. Significant symptoms on admission included delusions of persecution with grandiose beliefs, homicidal ideations, as well as disorganized thinking and behavior. The MSE on admission was pertinent for a thought process which was perseverative, circumstantial, tangential, disorganized and tangential; with rambling and looseness of associations. Psychological contributions to presentation included lack of insight. Social factors contributing to presentation included isolation, recent divorce, selling his house, and moving from hotel to hotel. Biological contributions to presentation included a history of hyperparathyroidism with chronic hypercalcemia per charts as well as a history of methamphetamine use per collateral from ex-wife.     History was difficult to obtain due to the patient's severe disorganized thinking on interview; he was observed with persecutory delusions pertaining to the realtor and gang members, disorganized behavior as these delusions have led him to flee to different hotels to stay safe/ has called  complaining of being targeted and auditory hallucinations of voices for the past 12 months. Per collateral from ex-wife, Santos has had paranoid ideations since they first met. He has always felt like people are out to get him or trying to rip him off. She says that he has had visual hallucinations (he will point things out that the rest of the family can't see) for a long time, but the family would just go along with him to avoid making him angry. This timeline and presentation could be consistent with diagnosis of paranoid personality disorder. Things began to worsen around the beginning of the COVID pandemic, when Santos became more isolated and the family started to notice cognitive issues such as impaired memory. Immediately prior to  this hospitalization, the ex-wife found Santos in a hotel room with a generator full of gasoline, binoculars, and hunting equipment. This time course does not suggest acute psychosis, however given the patient has never had a full psychiatric work-up, we completed a first-episode psychosis work-up.      Other things that could be contributing to his presentation is hyperparathyroidism with hypercalcemia. However, patient's calcium returned to normal limits on 10/16, and patient continues to have psychosis so likely not a significant contributing factor to patient's presentation.      Patient also continues to be disoriented and his behaviors appear to worsen in the evenings. This raised concern for underlying neurocognitive disorder with delirium. Patient received MRI brain with and without contrast which showed frontal and parietal atrophy greater than would be expected for patient's age. This is consistent with neurocognitive disorder. Patient has continued to improve with decreasing his antipsychotic. Working to get patient transferred to geriatric psychiatry in the short term and to memory care long term.      Given that he currently has psychosis, patient warrants inpatient psychiatric hospitalization to maintain his safety.     Psychiatric Plan by Diagnosis      Today's changes:  None     # Major Neurocognitive Disorder  # Rule out paranoid personality disorder  1. Medications:  - Discontinued PTA fluoxetine 40 mg, consider restarting at a later time   - Olanzapine 2.5 mg during afternoon and 5 mg at bedtime  - Memantine 5 mg, daily  - Neurology consult 11/8/24, recommend outpatient follow-up and neuropsychiatric testing     2. Pertinent Labs/Monitoring:   - See above     3. Additional Plans:  - Patient will be treated in therapeutic milieu with appropriate individual and group therapies as described     # Unspecified anxiety vs Generalized Anxiety Disorder  - Monitor for symptoms.  - Fluoxetine held due to  suspicion of ongoing manic symptoms     Psychiatric Hospital Course:      Estevan Aaron was admitted to [facility] on a 72 hour hold.   Medications:  PTA fluoxetine was held due to concern for worsening of zelalem   New medications started at the time of admission include Zyprexa.   Increased olanzapine 10 mg at bedtime was to 10 mg BID (10/14)   Increased olanzapine 10 mg BID to 10 mg during day and 15 mg at bedtime (10/15)  10/21: increased olanzapine pm dose from 15 to 20 mg  10/23: started melatonin 3 mg at 7 pm to help patient with circadian rhythm as he has been staying up throughout the night and sleeping a lot during the day and there is some concern for delirium   10/24: consulted anesthesia for MRI brain   10/28: MRI brain complete, decrease AM olanzapine from 10 to 5 mg  10/29: Morning olanzapine decreased to 2.5 mg, evening olanzapine decreased to 15 mg   11/1: Decreased evening olanzapine to 10 mg   11/4: Decreased evening olanzapine to 7.5 mg   11/5: Decreased evening olanzapine to 5 mg   11/11: Moved morning dose of olanzapine to the afternoon as patient appears more agitated in the afternoons/evenings  11/21: Started memantine 5 mg daily      Care conference 10/18/24 with daughter Maria C and ex-wife Clifford  - Report that patient has always been paranoid since ex-wife first met him. She describes him always feeling like he is getting ripped off.   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 They reported once incidence in which he made a bid on a job and the client paid him, but  he never ended up finishing the job  - Wife and him  officially this year, and since selling the house several months ago, patient has been moving from hotel to hotel due to thinking people are out to get him   - When they were selling their house, patient threatened realtors and felt he was being ripped off   - Clifford reports that she started to be afraid to be around him alone  - When he was found in the hotel, he had a generator full of gasoline, binoculars, bullets, and hunting equipment.     10/21/24: updated daughter on Santos's treatment plan      10/23/24: updated daughter on Santos's treatment plan      10/25/24: updated daughter on Santos's treatment plan, including plan to try and get MRI brain done under sedation. She said that Santos's grandfather had dementia and his sister has a brain tumor.      10/28/24: updated daughter on Santos's MRI results. She is planning on coming into town soon.      Care conference 11/1/24 with daughters Maria C and Estefani and ex-wife Clifford  - Discussed dementia diagnosis   - Clifford asked about plans moving forward. Explained that applying for medical assistance is the first step. Explained that application processing time can be very variable. Informed family that Santos will most likely be staying on this inpatient unit during this time.   - Clifford expressed that their family would like to be the power of /have conservatorship for Santos.   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 Reports that he has bills that he has not paid.   - Maria C is planning to move to [place], and Clifford currently lives in [place]. Family prefer that Santos would in a facility that are near these locations. Discussed with family that they can also try to request a specific location (memory care).   - Clifford reports that he had previously gotten help applying for his social security and medicare, but unsure if it had been approved.    - Informed family that there is a geriatric unit and there might be a transfer if there becomes availability on this unit.   - Family shared that he was completely different just two months ago.   - Estefani shared that his family found his medications in his old truck recently, expressed that it was likely that he was not taking his medications prior to his hospitalization.      11/6/24: updated Maria C on plan to try and transfer Santos to Geriatric unit.      11/11/24: updated Maria C on neurology consult      The risks, benefits, alternatives, and side effects were discussed and understood by the patient and other caregivers.     Medical Assessment and Plan     Medical diagnoses to be addressed this admission:    # Hypertension  - Continue PTA medications  Furosemide 40 mg daily  Lisinopril 40 mg daily  Metoprolol 50 mg daily  Amlodipine 10 mg daily  Clonidine 0.1 mg BID     # Hyperlipidemia  - Continue PTA Atorvastatin 40 mg     # Hyperparathyroidism  # Hypercalcemia, hypophosphoremia   Increased Ca level to 10.9 and decreased Ph level to 2.3 in the ED. Suspicion patient's hypercalcemia could be contributing to symptoms of psychosis.  - Consulted endocrinology, who started cinacalcet 30 mg BID on 10/13  - 10/16 endocrinology recommended continuing to trend calcium and albumin to make sure patient does not become hypocalcemic and recommended holding cinacalcet   - 10/17, calcium and albumin wnl, endo recommended cinacalcet 30 mg once daily   - 10/21, per endo continue cincaclcet and recheck calcium and albumin on October 24  - 10/23: per endo, patient needs to follow up outpatient for further management of hyperparathyroidism      Medical course: Patient was physically examined by the ED prior to being transferred to the unit and was found to be medically stable and appropriate for admission.      Consults: Psychiatry, Endocrinology (follow-up of hyperthyroidism / hypercalcemia and hypertension), neurology      Checklist     Legal Status: Committed     Safety Assessment:   Behavioral Orders   Procedures    Assault precautions    Code 1 - Restrict to Unit    Routine Programming     As clinically indicated    Status 15     Every 15 minutes.    Status Individual Observation     1:1 during evening shift, & night shift.    If patient refuses overnight presence of staff in his room, it's ok to do 15 min checks through the window blinds.    Patient SIO status reviewed with team/RN.  Please also refer to RN/team documentation for add'l detail.    -SIO staff to monitor following which have contributed to patient being on SIO:  Pt has psychosis regarding being followed and attacked, very paranoid, HI    -Possible interventions SIO staff could use to support patient's treatment progress:  Redirect and reassure  -When following observed, team will review discontinuation of SIO:  Psychosis improves     Order Specific Question:   CONTINUOUS 24 hours / day     Answer:   Other     Order Specific Question:   Specify distance     Answer:   10 feet, 5 feet when close to the doors     Order Specific Question:   Indications for SIO     Answer:   Assault risk     Order Specific Question:   Indications for SIO     Answer:   Severe intrusiveness       Risk Assessment:  Risk for harm is elevated.  Risk factors: impulsive and past behaviors  Protective factors: family      Disposition: Pending stabilization, medication optimization, & development of a safe discharge plan.     Attestations     Resident without student: This patient was seen and discussed with my attending physician.  [facility]  Psychiatry Resident Physician    Attestation:  This patient has been seen and evaluated by me, Pete Smith MD.  I have discussed this patient with the house staff team including the resident and/or medical student and I agree with the findings and plan in this note.    I have reviewed today's vital signs, medications, labs and imaging. Pete  MD Sarah , PhD.

## 2024-11-25 NOTE — PROVIDER NOTIFICATION
11/25/24 0914   Individualization/Patient Specific Goals   Patient Personal Strengths resourceful;resilient;positive vocational history;ability to maintain sobriety   Patient Vulnerabilities housing insecurity;lacks insight into illness;poor impulse control   Interprofessional Rounds   Summary Discussed patient progress and ongoing MH symptoms. Emergency guardianship/conservatorship has been granted.   Participants nursing;CTC;psychiatrist;other (see comments)   Behavioral Team Discussion   Participants Dr. Sarah MD; Dr. Pedro Pablo MD; Catherine GRACE; Kylee Becerra [facility]; medical students   Progress Minimal   Anticipated length of stay 60+ days   Continued Stay Criteria/Rationale Medication management; symptom stabilization; care coordination   Medical/Physical See H&P   Precautions See below   Plan Psychiatric assessment/Medication management. Therapeutic Milieu. Individual care planning and after care planning. Patient to participate in unit groups and activities. Individual and group support on unit.   Safety Plan Completed by unit therapist prior to discharge   Anticipated Discharge Disposition another healthcare facility     PRECAUTIONS AND SAFETY    Behavioral Orders   Procedures    Assault precautions    Code 1 - Restrict to Unit    Routine Programming     As clinically indicated    Status 15     Every 15 minutes.    Status Individual Observation     1:1 during evening shift, & night shift.    If patient refuses overnight presence of staff in his room, it's ok to do 15 min checks through the window blinds.    Patient SIO status reviewed with team/RN.  Please also refer to RN/team documentation for add'l detail.    -SIO staff to monitor following which have contributed to patient being on SIO:  Pt has psychosis regarding being followed and attacked, very paranoid, HI    -Possible interventions SIO staff could use to support patient's treatment progress:  Redirect and reassure  -When following observed,  team will review discontinuation of SIO:  Psychosis improves     Order Specific Question:   CONTINUOUS 24 hours / day     Answer:   Other     Order Specific Question:   Specify distance     Answer:   10 feet, 5 feet when close to the doors     Order Specific Question:   Indications for SIO     Answer:   Assault risk     Order Specific Question:   Indications for SIO     Answer:   Severe intrusiveness       Safety  Safety WDL: WDL  Patient Location: lounge, dining room, hallway, patient room, own  Observed Behavior: calm, walking, sitting  Observed Behavior (Comment): Watching TV  Airway Safety Measures: other (see comments)  Safety Measures: safety rounds completed  Diversional Activity: television, art work  De-Escalation Techniques: verbally redirected  Suicidality: Status 15, SIO (Status Individual Observation)  (NOTE - order will specify distance  Assault: status 15, private room  Elopement Assessment: Loitering near exit doors, Statements about wanting to leave  Elopement Interventions: status 15

## 2024-11-25 NOTE — PLAN OF CARE
"  Problem: Sleep Disturbance  Goal: Adequate Sleep/Rest  Outcome: Progressing   Goal Outcome Evaluation:    Patient appears to have slept a total of 3.5 hours.     Patient suddenly upset and argumentative with staff around mid shift, calling staff \"slobs\", asking about trucks, and later on slamming his room door shut. Pt also tried to barricade his room with a chair but was redirected from this. Pt only receptive to redirection from one select staff during these episodes and generally verbally hostile to rest of staff.     Compression socks removed due to being too tight on Pt's ankle. Pt encouraged to elevate his legs. Safety/environment checks conducted every 15 minutes with no further concerns noted. No complaints of pain/discomfort.      "

## 2024-11-26 ENCOUNTER — TELEPHONE (OUTPATIENT)
Dept: BEHAVIORAL HEALTH | Facility: CLINIC | Age: 64
End: 2024-11-26
Payer: COMMERCIAL

## 2024-11-26 PROCEDURE — 250N000013 HC RX MED GY IP 250 OP 250 PS 637

## 2024-11-26 PROCEDURE — 99232 SBSQ HOSP IP/OBS MODERATE 35: CPT | Mod: GC | Performed by: PSYCHIATRY & NEUROLOGY

## 2024-11-26 PROCEDURE — 124N000002 HC R&B MH UMMC

## 2024-11-26 RX ADMIN — AMLODIPINE BESYLATE 10 MG: 5 TABLET ORAL at 08:16

## 2024-11-26 RX ADMIN — ATORVASTATIN CALCIUM 40 MG: 20 TABLET, FILM COATED ORAL at 08:16

## 2024-11-26 RX ADMIN — Medication 1 PATCH: at 08:17

## 2024-11-26 RX ADMIN — CLONIDINE HYDROCHLORIDE 0.1 MG: 0.1 TABLET ORAL at 20:27

## 2024-11-26 RX ADMIN — CLONIDINE HYDROCHLORIDE 0.1 MG: 0.1 TABLET ORAL at 08:17

## 2024-11-26 RX ADMIN — CINACALCET 30 MG: 30 TABLET ORAL at 08:16

## 2024-11-26 RX ADMIN — Medication 3 MG: at 20:28

## 2024-11-26 RX ADMIN — METOPROLOL SUCCINATE 50 MG: 50 TABLET, EXTENDED RELEASE ORAL at 08:17

## 2024-11-26 RX ADMIN — LISINOPRIL 40 MG: 10 TABLET ORAL at 08:16

## 2024-11-26 RX ADMIN — MEMANTINE 5 MG: 5 TABLET ORAL at 08:17

## 2024-11-26 RX ADMIN — Medication 1250 MCG: at 15:26

## 2024-11-26 RX ADMIN — OLANZAPINE 5 MG: 5 TABLET, ORALLY DISINTEGRATING ORAL at 20:27

## 2024-11-26 RX ADMIN — FUROSEMIDE 40 MG: 40 TABLET ORAL at 08:17

## 2024-11-26 ASSESSMENT — ACTIVITIES OF DAILY LIVING (ADL)
ADLS_ACUITY_SCORE: 85
ADLS_ACUITY_SCORE: 74
ADLS_ACUITY_SCORE: 85
HYGIENE/GROOMING: INDEPENDENT;PROMPTS
ADLS_ACUITY_SCORE: 85
DRESS: INDEPENDENT;PROMPTS
ADLS_ACUITY_SCORE: 85
HYGIENE/GROOMING: PROMPTS;INDEPENDENT
ADLS_ACUITY_SCORE: 85
DRESS: PROMPTS;INDEPENDENT
ORAL_HYGIENE: PROMPTS;INDEPENDENT
ORAL_HYGIENE: INDEPENDENT;PROMPTS
ADLS_ACUITY_SCORE: 85

## 2024-11-26 NOTE — PLAN OF CARE
Problem: Sleep Disturbance  Goal: Adequate Sleep/Rest  Outcome: Not Progressing   Patient sleeps on and off this shift. Patient is pleasantly confused, mood calm, no behavioral issues noted. Patient denies pain and discomfort. BLE with +3 edema noted-encouraged to elevate on pillow while in bed. Patient is on diuretics. Patient continuous on SIO at night and evening shift with 1 staff for Assault risk and Severe intrusiveness. Specify distance: 10 feet, 5 feet when close to the doors. Patient is easy to redirect. Will continue to monitor the patient and notify MD with any concerns. Patient appears to have slept for 3.75 hours.

## 2024-11-26 NOTE — PROGRESS NOTES
"  ----------------------------------------------------------------------------------------------------------  [facility]  Psychiatry Progress Note  Hospital Day #45     Interim History:     The patient's care was discussed with the treatment team and chart notes were reviewed.    Vitals: VSS  Sleep: 3.75 hours (11/26/24 0634)  Scheduled medications: Took all scheduled medications as prescribed  Psychiatric PRN medications:   None  Staff Report:   Patient sleeps on and off this shift. Patient is pleasantly confused, and irritable at times. no behavioral issues noted   Patient denies pain and discomfort.   BLE with +3 edema noted-encouraged to elevate on pillow while in bed.   Pt has been repeatly coming to staff asking for his truck. Pt states its downstairs. Pt wanting his wallet and phone.  - Please see staff notes for details.      Subjective:     Patient Interview:  Patient states he is feeling good. Feels some anxiety. Patient feels his mood is doing well. Still demonstrates confusion and states \"he is going up to get the plumbing changed\" and states \"he has done it many times.\" Also said he had a meeting with the . Santos denies any chest pain. There is bilateral pitting edema which is more pronounced on the right than on the left. The patient states that he has bilateral leg edema for a significant amount of time. The patient was unable to state his location but could confirm that he was at a hospital when prompted. The patient was unable to provide comprehensible goals and instead stated he was hoping to work more on the construction project that he believed was happening in the unit.    ROS:  See above     Objective:     Vitals:  /71 (BP Location: Left arm, Patient Position: Sitting, Cuff Size: Adult Large)   Pulse 61   Temp 97.7  F (36.5  C) (Oral)   Resp 16   Wt 107.5 kg (236 lb 14.4 oz)   SpO2 96%   BMI 38.24 kg/m      Allergies:  No " Known Allergies    Current Medications:  Scheduled:  Current Facility-Administered Medications   Medication Dose Route Frequency Provider Last Rate Last Admin    acetaminophen (TYLENOL) tablet 650 mg  650 mg Oral Q4H PRN Nikki Caceres MD   650 mg at 11/14/24 0613    alum & mag hydroxide-simethicone (MAALOX) suspension 30 mL  30 mL Oral Q4H PRN Nikki Caceres MD   30 mL at 10/17/24 0837    amLODIPine (NORVASC) tablet 10 mg  10 mg Oral Daily Presley Barrera MD   10 mg at 11/25/24 0854    atorvastatin (LIPITOR) tablet 40 mg  40 mg Oral Daily Nikki Caceres MD   40 mg at 11/25/24 0854    cholecalciferol (VITAMIN D3) capsule 1,250 mcg  1,250 mcg Oral Q7 Days Evelia Grimm DO   1,250 mcg at 11/19/24 1259    cinacalcet (SENSIPAR) tablet 30 mg  30 mg Oral Daily Presley Barrera MD   30 mg at 11/25/24 0854    cloNIDine (CATAPRES) tablet 0.1 mg  0.1 mg Oral BID Presley Barrera MD   0.1 mg at 11/25/24 1916    furosemide (LASIX) tablet 40 mg  40 mg Oral Daily Presley Barrera MD   40 mg at 11/25/24 0855    gabapentin (NEURONTIN) capsule 100 mg  100 mg Oral Q6H PRN Nikki Caceres MD   100 mg at 11/15/24 0418    hydrocortisone (CORTAID) 0.5 % cream   Topical Daily PRN Savi Chamorro MD        lisinopril (ZESTRIL) tablet 40 mg  40 mg Oral Daily Presley Barrera MD   40 mg at 11/25/24 0854    melatonin tablet 3 mg  3 mg Oral At Bedtime Presley Barrera MD   3 mg at 11/25/24 1916    memantine (NAMENDA) tablet 5 mg  5 mg Oral Daily Savi Chamorro MD   5 mg at 11/25/24 0854    metoprolol succinate ER (TOPROL XL) 24 hr tablet 50 mg  50 mg Oral Daily Presley Barrera MD   50 mg at 11/25/24 0854    nicotine (NICODERM CQ) 14 MG/24HR 24 hr patch 1 patch  1 patch Transdermal Daily Siirsha, Evelia, DO   1 patch at 11/25/24 0916    nicotine (NICODERM CQ) 21 MG/24HR 24 hr patch 1 patch  1 patch Transdermal Daily PRN Britt Kang MD   1 patch at 10/13/24 1611    nicotine (NICORETTE) gum 2 mg  2 mg Buccal Q1H PRN  González Garcia MD   2 mg at 10/24/24 1254    OLANZapine (zyPREXA) tablet 5 mg  5 mg Oral TID PRN Evelia Grimm DO   5 mg at 11/24/24 0436    Or    OLANZapine (zyPREXA) injection 5 mg  5 mg Intramuscular TID PRN Evelia Grimm DO        OLANZapine zydis (zyPREXA) ODT half-tab 2.5 mg  2.5 mg Oral QAM Presley Barrera MD   2.5 mg at 11/25/24 1520    OLANZapine zydis (zyPREXA) ODT tab 5 mg  5 mg Oral At Bedtime González Garcia MD   5 mg at 11/25/24 1916    polyethylene glycol (MIRALAX) Packet 17 g  17 g Oral Daily PRN Nikki Caceres MD        traZODone (DESYREL) half-tab 25 mg  25 mg Oral At Bedtime PRN Evelia Grimm DO   25 mg at 11/21/24 0254    Or    traZODone (DESYREL) tablet 50 mg  50 mg Oral At Bedtime PRN Evelia Grimm DO           PRN:  Current Facility-Administered Medications   Medication Dose Route Frequency Provider Last Rate Last Admin    acetaminophen (TYLENOL) tablet 650 mg  650 mg Oral Q4H PRN Nikki Caceres MD   650 mg at 11/14/24 0613    alum & mag hydroxide-simethicone (MAALOX) suspension 30 mL  30 mL Oral Q4H PRN Nikki Caceres MD   30 mL at 10/17/24 0837    amLODIPine (NORVASC) tablet 10 mg  10 mg Oral Daily Presley Barrera MD   10 mg at 11/25/24 0854    atorvastatin (LIPITOR) tablet 40 mg  40 mg Oral Daily Nikki Caceres MD   40 mg at 11/25/24 0854    cholecalciferol (VITAMIN D3) capsule 1,250 mcg  1,250 mcg Oral Q7 Days Evelia Grimm DO   1,250 mcg at 11/19/24 1259    cinacalcet (SENSIPAR) tablet 30 mg  30 mg Oral Daily Presley Barrera MD   30 mg at 11/25/24 0854    cloNIDine (CATAPRES) tablet 0.1 mg  0.1 mg Oral BID Presley Barrera MD   0.1 mg at 11/25/24 1916    furosemide (LASIX) tablet 40 mg  40 mg Oral Daily Presley Barrera MD   40 mg at 11/25/24 0855    gabapentin (NEURONTIN) capsule 100 mg  100 mg Oral Q6H PRN Nikki Caceres MD   100 mg at 11/15/24 0418    hydrocortisone (CORTAID) 0.5 % cream   Topical Daily PRN Savi Chamorro MD        lisinopril (ZESTRIL) tablet  40 mg  40 mg Oral Daily Presley Barrera MD   40 mg at 11/25/24 0854    melatonin tablet 3 mg  3 mg Oral At Bedtime Presley Barrera MD   3 mg at 11/25/24 1916    memantine (NAMENDA) tablet 5 mg  5 mg Oral Daily Savi Chamorro MD   5 mg at 11/25/24 0854    metoprolol succinate ER (TOPROL XL) 24 hr tablet 50 mg  50 mg Oral Daily Presley Barrera MD   50 mg at 11/25/24 0854    nicotine (NICODERM CQ) 14 MG/24HR 24 hr patch 1 patch  1 patch Transdermal Daily Evelia Grimm DO   1 patch at 11/25/24 0916    nicotine (NICODERM CQ) 21 MG/24HR 24 hr patch 1 patch  1 patch Transdermal Daily PRN Britt Kang MD   1 patch at 10/13/24 1611    nicotine (NICORETTE) gum 2 mg  2 mg Buccal Q1H PRN González Garcia MD   2 mg at 10/24/24 1254    OLANZapine (zyPREXA) tablet 5 mg  5 mg Oral TID PRN Evelia Grimm DO   5 mg at 11/24/24 0436    Or    OLANZapine (zyPREXA) injection 5 mg  5 mg Intramuscular TID PRN Evelia Grimm DO        OLANZapine zydis (zyPREXA) ODT half-tab 2.5 mg  2.5 mg Oral QAM Presley Barrera MD   2.5 mg at 11/25/24 1520    OLANZapine zydis (zyPREXA) ODT tab 5 mg  5 mg Oral At Bedtime González Garcia MD   5 mg at 11/25/24 1916    polyethylene glycol (MIRALAX) Packet 17 g  17 g Oral Daily PRN Nikki Caceres MD        traZODone (DESYREL) half-tab 25 mg  25 mg Oral At Bedtime PRN Evelia Grimm DO   25 mg at 11/21/24 0254    Or    traZODone (DESYREL) tablet 50 mg  50 mg Oral At Bedtime PRN Evelia Grimm DO         Labs and Imaging:  Data this admission:  - CBC unremarkable  - CMP unremarkable  - TSH normal  - UDS negative  - Vit D low  - Hgb A1c 5.9 (10/13/24)  - Lipids unremarkable  - Vit B12 normal  - Folate normal  - Urinalysis unremarkable  - EKG normal sinus rhythm, QTc 390 ms  - Head CT showed no acute changes  - HIV non reactive  - Treponema antibody non reactive  - ESR wnl   - Ceruloplasmin wnl   - BOOGIE negative  - Lyme negative      Parathyroid hormone:   10/13: 85     Calcium:  10/14: 11.4  10/16:  "10.3  10/17: 10.3  10/19: 9.8  10/21: 10.6  10/23: 10.3     Albumin:  10/14: 4.3  10/16: 4.3  10/17: 4.1  10/19: 4.2  10/21: 4.5  10/23: 4.3      CXR:   Impression:   1. No definite radiographic evidence of asbestosis. If clinically  indicated, consider evaluation with high resolution chest CT.  2. Nonspecific left costophrenic blunting. Given symmetrically low  lung volumes may be related to poor inspiratory effort/timing.  3. Pulmonary vascular congestion.     MRI:   IMPRESSION:  1. No acute intracranial pathology.  2. No focal lesion or structural abnormality.   3. Symmetric frontoparietal cortical volume loss slightly more than  expected for age.     Mental Status Exam:   Oriented to: Oriented to self but not time and place  General:  Awake and Alert  Appearance:  appears stated age, Grooming is adequate, and Dressed in his own clothes  Behavior/Attitude:  Calm and Easily distracted  Eye Contact: Appropriate for conversation, downcast at times  Psychomotor: No evidence of tics, dystonia, or tardive dyskinesia  no catatonia present  Speech:  appropriate volume/tone  Language: Fluent in English with appropriate syntax and vocabulary.  Mood:  \"I'm doing good\"  Affect:  congruent with mood  Thought Process:  disorganized and confused  Thought Content:   SI/HI/ delusion of confusion  . Patient denies AH/VH  Associations:  loose  Insight:  limited   Judgment:  limited   Impulse control: limited  Attention Span:   decreased  Concentration:   distractible  Recent and Remote Memory:   remote memory intact, recent memory impaired  Fund of Knowledge: average  Muscle Strength and Tone: normal  Gait and Station: Normal     Psychiatric Assessment     Estevan Aaron is a 64 year old male with previous psychiatric diagnoses of GEORGINA admitted from the ER on 10/12/2024 due to concern for HI and psychosis in the context of medical issues (hyperthyroidism, hypercalcemia), psychosocial stressors including with recent divorce and " selling his home. This is the patient's first psychiatric hospitalization. Significant symptoms on admission included delusions of persecution with grandiose beliefs, homicidal ideations, as well as disorganized thinking and behavior. The MSE on admission was pertinent for a thought process which was perseverative, circumstantial, tangential, disorganized and tangential; with rambling and looseness of associations. Psychological contributions to presentation included lack of insight. Social factors contributing to presentation included isolation, recent divorce, selling his house, and moving from hotel to hotel. Biological contributions to presentation included a history of hyperparathyroidism with chronic hypercalcemia per charts as well as a history of methamphetamine use per collateral from ex-wife.     History was difficult to obtain due to the patient's severe disorganized thinking on interview; he was observed with persecutory delusions pertaining to the realtor and gang members, disorganized behavior as these delusions have led him to flee to different hotels to stay safe/ has called  complaining of being targeted and auditory hallucinations of voices for the past 12 months. Per collateral from ex-wife, Santos has had paranoid ideations since they first met. He has always felt like people are out to get him or trying to rip him off. She says that he has had visual hallucinations (he will point things out that the rest of the family can't see) for a long time, but the family would just go along with him to avoid making him angry. This timeline and presentation could be consistent with diagnosis of paranoid personality disorder. Things began to worsen around the beginning of the COVID pandemic, when Santos became more isolated and the family started to notice cognitive issues such as impaired memory. Immediately prior to this hospitalization, the ex-wife found Santos in a hotel room with a generator full of  gasoline, binoculars, and hunting equipment. This time course does not suggest acute psychosis, however given the patient has never had a full psychiatric work-up, we completed a first-episode psychosis work-up.      Other things that could be contributing to his presentation is hyperparathyroidism with hypercalcemia. However, patient's calcium returned to normal limits on 10/16, and patient continues to have psychosis so likely not a significant contributing factor to patient's presentation.      Patient also continues to be disoriented and his behaviors appear to worsen in the evenings. This raised concern for underlying neurocognitive disorder with delirium. Patient received MRI brain with and without contrast which showed frontal and parietal atrophy greater than would be expected for patient's age. This is consistent with neurocognitive disorder. Patient has continued to improve with decreasing his antipsychotic. Working to get patient transferred to geriatric psychiatry in the short term and to memory care long term.      Given that he currently has psychosis, patient warrants inpatient psychiatric hospitalization to maintain his safety.     Psychiatric Plan by Diagnosis    Today's changes:   Discontinued olanzapine 2.5 mg during the afternoon  # Major Neurocognitive Disorder  # Rule out paranoid personality disorder  1. Medications:  - Olanzapine 5 mg at bedtime  - Memantine 5 mg, daily  - Neurology consult 11/8/24, recommend outpatient follow-up and neuropsychiatric testing     2. Pertinent Labs/Monitoring:   - See above     3. Additional Plans:  - Patient will be treated in therapeutic milieu with appropriate individual and group therapies as described     # Unspecified anxiety vs Generalized Anxiety Disorder  - Monitor for symptoms.  - Fluoxetine held due to suspicion of ongoing manic symptoms     Psychiatric Hospital Course:      Estevan Aaron was admitted to [facility] on a 72 hour hold.    Medications:  PTA fluoxetine was held due to concern for worsening of zelalem   New medications started at the time of admission include Zyprexa.   Increased olanzapine 10 mg at bedtime was to 10 mg BID (10/14)   Increased olanzapine 10 mg BID to 10 mg during day and 15 mg at bedtime (10/15)  10/21: increased olanzapine pm dose from 15 to 20 mg  10/23: started melatonin 3 mg at 7 pm to help patient with circadian rhythm as he has been staying up throughout the night and sleeping a lot during the day and there is some concern for delirium   10/24: consulted anesthesia for MRI brain   10/28: MRI brain complete, decrease AM olanzapine from 10 to 5 mg  10/29: Morning olanzapine decreased to 2.5 mg, evening olanzapine decreased to 15 mg   11/1: Decreased evening olanzapine to 10 mg   11/4: Decreased evening olanzapine to 7.5 mg   11/5: Decreased evening olanzapine to 5 mg   11/11: Moved morning dose of olanzapine to the afternoon as patient appears more agitated in the afternoons/evenings  11/21: Started memantine 5 mg daily   11/26: Discontinued olanzapine 2.5 mg during the afternoon     Care conference 10/18/24 with daughter Maria C and ex-wife Clifford  - Report that patient has always been paranoid since ex-wife first met him. She describes him always feeling like he is getting ripped off.   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 They reported once incidence in which he made a bid on a job and the client paid him, but he never ended up finishing the job  - Wife and him  officially  this year, and since selling the house several months ago, patient has been moving from hotel to hotel due to thinking people are out to get him   - When they were selling their house, patient threatened realtors and felt he was being ripped off   - Clifford reports that she started to be afraid to be around him alone  - When he was found in the hotel, he had a generator full of gasoline, binoculars, bullets, and hunting equipment.     10/21/24: updated daughter on Santos's treatment plan      10/23/24: updated daughter on Santos's treatment plan      10/25/24: updated daughter on Santos's treatment plan, including plan to try and get MRI brain done under sedation. She said that Santos's grandfather had dementia and his sister has a brain tumor.      10/28/24: updated daughter on Santos's MRI results. She is planning on coming into town soon.      Care conference 11/1/24 with daughters Maria C and Estefani and ex-wife Clifford  - Discussed dementia diagnosis   - Clifford asked about plans moving forward. Explained that applying for medical assistance is the first step. Explained that application processing time can be very variable. Informed family that Santos will most likely be staying on this inpatient unit during this time.   - Clifford expressed that their family would like to be the power of /have conservatorship for Santos.   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 Reports that he has bills that he has not paid.   - Maria C is planning to move to [place], and Clifford currently lives in [place]. Family prefer that Santos would in a facility that are near these locations. Discussed with family that they can also try to request a specific location (memory care).   - Clifford reports that he had previously gotten help applying for his social security and medicare, but unsure if it had been approved.   - Informed family that there is a geriatric unit and there might be a  transfer if there becomes availability on this unit.   - Family shared that he was completely different just two months ago.   - Estefani shared that his family found his medications in his old truck recently, expressed that it was likely that he was not taking his medications prior to his hospitalization.      11/6/24: updated Maria C on plan to try and transfer Santos to Geriatric unit.      11/11/24: updated Maria C on neurology consult      The risks, benefits, alternatives, and side effects were discussed and understood by the patient and other caregivers.     Medical Assessment and Plan     Medical diagnoses to be addressed this admission:    # Hypertension  - Continue PTA medications  Furosemide 40 mg daily  Lisinopril 40 mg daily  Metoprolol 50 mg daily  Amlodipine 10 mg daily  Clonidine 0.1 mg BID     # Hyperlipidemia  - Continue PTA Atorvastatin 40 mg     # Hyperparathyroidism  # Hypercalcemia, hypophosphoremia   Increased Ca level to 10.9 and decreased Ph level to 2.3 in the ED. Suspicion patient's hypercalcemia could be contributing to symptoms of psychosis.  - Consulted endocrinology, who started cinacalcet 30 mg BID on 10/13  - 10/16 endocrinology recommended continuing to trend calcium and albumin to make sure patient does not become hypocalcemic and recommended holding cinacalcet   - 10/17, calcium and albumin wnl, endo recommended cinacalcet 30 mg once daily   - 10/21, per endo continue cincaclcet and recheck calcium and albumin on October 24  - 10/23: per endo, patient needs to follow up outpatient for further management of hyperparathyroidism      Medical course: Patient was physically examined by the ED prior to being transferred to the unit and was found to be medically stable and appropriate for admission.      Consults: Psychiatry, Endocrinology (follow-up of hyperthyroidism / hypercalcemia and hypertension), neurology     Checklist     Legal Status: Committed     Safety Assessment:   Behavioral  Orders   Procedures    Assault precautions    Code 1 - Restrict to Unit    Routine Programming     As clinically indicated    Status 15     Every 15 minutes.    Status Individual Observation     1:1 during evening shift, & night shift.    If patient refuses overnight presence of staff in his room, it's ok to do 15 min checks through the window blinds.    Patient SIO status reviewed with team/RN.  Please also refer to RN/team documentation for add'l detail.    -SIO staff to monitor following which have contributed to patient being on SIO:  Pt has psychosis regarding being followed and attacked, very paranoid, HI    -Possible interventions SIO staff could use to support patient's treatment progress:  Redirect and reassure  -When following observed, team will review discontinuation of SIO:  Psychosis improves     Order Specific Question:   CONTINUOUS 24 hours / day     Answer:   Other     Order Specific Question:   Specify distance     Answer:   10 feet, 5 feet when close to the doors     Order Specific Question:   Indications for SIO     Answer:   Assault risk     Order Specific Question:   Indications for SIO     Answer:   Severe intrusiveness       Risk Assessment:  Risk for harm is elevated.  Risk factors: impulsive and past behaviors  Protective factors: family      Disposition: Pending stabilization, medication optimization, & development of a safe discharge plan.     Attestations     Resident without student: This patient was seen and discussed with my attending physician.  [facility]  [facility]iatry Resident Physician    Attestation:  This patient has been seen and evaluated by me, Pete Smith MD.  I have discussed this patient with the house staff team including the resident and/or medical student and I agree with the findings and plan in this note.    I have reviewed today's vital signs, medications, labs and imaging. Pete Smith MD , PhD.

## 2024-11-26 NOTE — TELEPHONE ENCOUNTER
R: Bed search ([facility] transfer - 3B) @ 1:40AM:     [facility]: No appropriate bed available on 3B. Declined 11/25/24 for 3B     Pt remains on the work list pending appropriate bed availability.

## 2024-11-26 NOTE — TELEPHONE ENCOUNTER
R: 12:13AM- Pt wants [facility] only.  3B.      [place]  Access Inpatient Bed Call Log 11/25/24   Intake has called facilities that have not updated the bed status within the last 12 hours.                          [facility] is posting 0 beds.                     Pt remains on the work list pending appropriate bed availability.

## 2024-11-26 NOTE — PLAN OF CARE
"  Problem: Sleep Disturbance  Goal: Adequate Sleep/Rest  Outcome: Not Progressing     Problem: Adult Behavioral Health Plan of Care  Goal: Patient-Specific Goal (Individualization)  Description: You can add care plan individualizations to a care plan. Examples of Individualization might be:  \"Parent requests to be called daily at 9am for status\", \"I have a hard time hearing out of my right ear\", or \"Do not touch me to wake me up as it startles  me\".  Outcome: Progressing  Goal: Adheres to Safety Considerations for Self and Others  Outcome: Progressing  Intervention: Develop and Maintain Individualized Safety Plan  Recent Flowsheet Documentation  Taken 11/26/2024 1200 by Rocío Curry RN  Safety Measures: safety rounds completed  Goal: Absence of New-Onset Illness or Injury  Outcome: Progressing  Intervention: Identify and Manage Fall Risk  Recent Flowsheet Documentation  Taken 11/26/2024 1200 by Rocío Curry RN  Safety Measures: safety rounds completed     Problem: Suicidal Behavior  Goal: Suicidal Behavior is Absent or Managed  Outcome: Progressing  Intervention: Provide Immediate and Ongoing Protective Physical Environment  Recent Flowsheet Documentation  Taken 11/26/2024 1200 by Rocío Curry RN  Safe Transition Promotion: protective factors promoted     Problem: Anxiety  Goal: Anxiety Reduction or Resolution  Outcome: Progressing  Intervention: Promote Anxiety Reduction  Recent Flowsheet Documentation  Taken 11/26/2024 1200 by Rocío Curry RN  Supportive Measures:   active listening utilized   self-care encouraged   self-reflection promoted   self-responsibility promoted   verbalization of feelings encouraged  Family/Support System Care:   involvement promoted   self-care encouraged   presence promoted   support provided   Goal Outcome Evaluation:    Plan of Care Reviewed With: patient      Pt continues to be disoriented and confused about his situation. His " thoughts perseverates on  getting  his truck. He is suspicious about people around him. He expressed frustration about his situation and his coffee. Pt nutrition and hydration is adequate. Consumes 100% of his meals. He pleasant and cooperative, medication compliant. Pt continues to have bilateral LE swelling. Pt  sits by [facility] area and snoozes off and on. No behavioral concerns this shift.

## 2024-11-26 NOTE — PLAN OF CARE
Team Note Due:  Monday    Assessment/Intervention/Current Symtoms and Care Coordination:  Chart review and met with team, discussed pt progress, symptomology, and response to treatment.  Discussed the discharge plan and any potential impediments to discharge. Clifford Aaron is currently emergency guardian/conservator until 01/20/2025.    Received update from family on Memory Care facilities, planned tours, etc. Updated below.    Discharge Plan or Goal:  Memory care facility     Barriers to Discharge:  Patient requires further psychiatric stabilization due to current symptomology, medication management with changes subject to provider, coordination with outside supports, and aftercare planning. Pt is under civil commitment.     Referral Status:    Memory Care facilities currently in process:  [facility]. Tour scheduled 11/27.  [facility]. Tour scheduled 11/29.  [facility]. Tour scheduled 11/29.  [facility]. Tour scheduled 11/30.    Memory Care facilities pending family review:  [facility].   [facility].  [facility].  [facility].  On license of [facility].  [facility].  [facility].    Memory Care facilities declined:  [facility] (private pay only, no EW).  [facility] [address] (private pay only, no EW).  [facility] (no openings).     Legal Status:  MI Commitment with [facility]  File Number: [id number]  Start and expiration date of commitment: 10/24/24 - 04/24/25    [facility] meds: Haldol, Clozaril, Risperdal, Invega, Zyprexa, Seroquel, Abilify    PPS/CM:  Shelby Chowdary: [phone]  [email or web address]    Contacts:  Maria C Aaron (Daughter): [phone]   Clifford Aaron (ex-wife): [phone]     No Del Angel (guardianship/conservatorship ): [phone]  [email or web address]     Upcoming Meetings and Dates/Important Information and next steps:  Follow up with family regarding list of Memory Care facilities  Discharge planning when appropriate  Pt does not qualify for MA per financial counselor on 11/8/2024. Pt will likely privately pay for Memory Care until he qualifies for MA/waivers in the future.  Pt will need to apply for MA before a MNChoices Assessment/SMRT can be completed for waivers.    Provisional Discharge and Change of Status needed at discharge

## 2024-11-26 NOTE — PLAN OF CARE
BEH IP Unit Acuity Rating Score (UARS)  Patient is given one point for every criteria they meet.    CRITERIA SCORING   On a 72 hour hold, court hold, committed, stay of commitment, or revocation. 1    Patient LOS on BEH unit exceeds 20 days. 1  LOS: 45   Patient under guardianship, 55+, otherwise medically complex, or under age 11. 1   Suicide ideation without relief of precipitating factors. 0   Current plan for suicide. 0   Current plan for homicide. 0   Imminent risk or actual attempt to seriously harm another without relief of factors precipitating the attempt. 1   Severe dysfunction in daily living (ex: complete neglect for self care, extreme disruption in vegetative function, extreme deterioration in social interactions). 1   Recent (last 7 days) or current physical aggression in the ED or on unit. 0   Restraints or seclusion episode in past 72 hours. 0   Recent (last 7 days) or current verbal aggression, agitation, yelling, etc., while in the ED or unit. 0   Active psychosis. 1   Need for constant or near constant redirection (from leaving, from others, etc).  0   Intrusive or disruptive behaviors. 1   Patient requires 3 or more hours of individualized nursing care per 8-hour shift (i.e. for ADLs, meds, therapeutic interventions). 1   TOTAL 8

## 2024-11-26 NOTE — TELEPHONE ENCOUNTER
R:  At the direction of Intake supervisor Kala, after consulting with Dr. Lambert and Eileen Cruz, there is not option for pt's transfer to . It is recommended that pt remain on [facility] to stabilize. Pt to be removed from work list.     3:32 PM Paged Sarah to inform him of pt being removed from work list.   3:33 PM Per call with Sarah, he states [facility] has been able to reasonably accommodate pt and is accepting of pt being removed from [facility] at this time.   3:39 PM Updated [facility] CRN on pt being removed from work list. He will pass onto the team.    Pt removed from work list at this time. Intake no longer following.

## 2024-11-26 NOTE — PLAN OF CARE
"Initial meeting note:    Therapist introduced self to patient and discussed psychotherapy service available to patient.     Pt response: Pt politely declined, reports \"not today,\" not currently interested in meeting 1:1.     Plan: Will continue to check in with Pt for 1:1 sessions.          "

## 2024-11-26 NOTE — PLAN OF CARE
Group Attendance:  attended partial group    Time session began: 1115  Time session ended: 1145  Patient's total time in group: 10    Total # Attendees   6   Group Type psychotherapeutic, life skills, and DBT     Group Topic Covered insight improvement, symptom management, DBT skills: emotional validation, Triggers and warning sighs, and relationships and boundaries     Group Session Detail Discussed DBT skill emotional validation, importance of self-compassion on mental health to enhance emotional resilience, reduce stress and anxiety, boost motivation and self-esteem and foster better relationships.      Patient's response to the group topic/interactions:  verbalizations were off topic, disruptive to the group, and was asked to leave      Patient Details: Pt came into group room partway through discussion, off-topic verbalizations were projected to entire group, was unable to redirect. Pt was gently asked to leave but did not, group was ended early.           No Charge (0-15 min)  Patient Active Problem List   Diagnosis    Hyperlipidemia LDL goal <130    Hypertension goal BP (blood pressure) < 130/80    Hypercalcemia    Hyperparathyroidism (H)    Thalassemia, unspecified type    Psychosis, unspecified psychosis type (H)    Acute psychosis (H)

## 2024-11-27 ENCOUNTER — APPOINTMENT (OUTPATIENT)
Dept: ULTRASOUND IMAGING | Facility: CLINIC | Age: 64
End: 2024-11-27
Payer: COMMERCIAL

## 2024-11-27 PROCEDURE — 250N000013 HC RX MED GY IP 250 OP 250 PS 637

## 2024-11-27 PROCEDURE — 93971 EXTREMITY STUDY: CPT | Mod: 26 | Performed by: RADIOLOGY

## 2024-11-27 PROCEDURE — 97150 GROUP THERAPEUTIC PROCEDURES: CPT | Mod: GO

## 2024-11-27 PROCEDURE — 99232 SBSQ HOSP IP/OBS MODERATE 35: CPT

## 2024-11-27 PROCEDURE — 99231 SBSQ HOSP IP/OBS SF/LOW 25: CPT | Mod: GC | Performed by: PSYCHIATRY & NEUROLOGY

## 2024-11-27 PROCEDURE — 124N000002 HC R&B MH UMMC

## 2024-11-27 PROCEDURE — 93971 EXTREMITY STUDY: CPT | Mod: LT

## 2024-11-27 RX ADMIN — MEMANTINE 5 MG: 5 TABLET ORAL at 08:36

## 2024-11-27 RX ADMIN — OLANZAPINE 5 MG: 5 TABLET, ORALLY DISINTEGRATING ORAL at 22:03

## 2024-11-27 RX ADMIN — Medication 3 MG: at 22:03

## 2024-11-27 RX ADMIN — LISINOPRIL 40 MG: 10 TABLET ORAL at 08:23

## 2024-11-27 RX ADMIN — CLONIDINE HYDROCHLORIDE 0.1 MG: 0.1 TABLET ORAL at 08:23

## 2024-11-27 RX ADMIN — ATORVASTATIN CALCIUM 40 MG: 20 TABLET, FILM COATED ORAL at 08:28

## 2024-11-27 RX ADMIN — FUROSEMIDE 40 MG: 40 TABLET ORAL at 08:19

## 2024-11-27 RX ADMIN — AMLODIPINE BESYLATE 10 MG: 5 TABLET ORAL at 08:19

## 2024-11-27 RX ADMIN — METOPROLOL SUCCINATE 50 MG: 50 TABLET, EXTENDED RELEASE ORAL at 08:23

## 2024-11-27 RX ADMIN — Medication 1 PATCH: at 08:46

## 2024-11-27 RX ADMIN — CLONIDINE HYDROCHLORIDE 0.1 MG: 0.1 TABLET ORAL at 22:02

## 2024-11-27 RX ADMIN — CINACALCET 30 MG: 30 TABLET ORAL at 08:19

## 2024-11-27 ASSESSMENT — ACTIVITIES OF DAILY LIVING (ADL)
ADLS_ACUITY_SCORE: 74
HYGIENE/GROOMING: PROMPTS;INDEPENDENT
ORAL_HYGIENE: PROMPTS;INDEPENDENT
ADLS_ACUITY_SCORE: 74
DRESS: PROMPTS;INDEPENDENT
ADLS_ACUITY_SCORE: 74

## 2024-11-27 NOTE — CONSULTS
[facility]  Consult Note - Hospitalist Service  Date of Admission:  10/12/2024  Consult Requested by: Savi Chamorro MD   Reason for Consult: Left lower extremity pain, low extremity edema     Assessment & Plan   Estevan Aaron is a 64 year old male admitted on 10/12/2024.  Medical history notable for hyperparathyroidism, hypertension, hyperlipidemia, tobacco use disorder, thalassemia.  Admitted by psychiatry for psychosis.    #Left lower extremity pain  #Bilateral lower extremity edema  States he has had lower extremity edema most of his life.  Recently noted that it has been increasing.  It was noted that he had some ache in his left lower extremity. Denies any past history of heart failure, shortness of breath, orthopnea.  Psych associate notes that he spends most of his day sitting and will sleep with recliner with feet down.  Upon exam note 2-3+ bilateral lower extremity edema, worse on the left.  DVT ultrasound left leg negative.  Current concern likely due to dependent edema.  Offered the patient compression stockings/lymphedema therapy.  Declined lymphedema therapy.   -Recommend compression stockings  -May need additional workup to assess for possible cardiac etiology  -Will reassess in a.m.       The patient's care was discussed with the Patient.    Clinically Significant Risk Factors                   # Hypertension: Noted on problem list                # Financial/Environmental Concerns:           Mohamud Miller PA-C  Hospitalist Service  Securely message with [facility] (more info)  Text page via [facility] Paging/Directory   ______________________________________________________________________    Chief Complaint   Lower extremity edema, left lower extremity pain    History is obtained from the patient    History of Present Illness   Estevan Aaron is a 64 year old male admitted on 10/12/2024.  Medical history notable for hyperparathyroidism, hypertension,  hyperlipidemia, tobacco use disorder, thalassemia.  Admitted by psychiatry for psychosis.  Saw patient in room on station [facility].  States that he has had lower extremity edema most of his life.  Recently has been getting worse.  Denies any cardiac history, shortness of breath, orthopnea.       Past Medical History    Past Medical History:   Diagnosis Date    Kidney stone     Serum calcium elevated     Tobacco use disorder     tobacco quit line referral done 4/19/06       Past Surgical History   Past Surgical History:   Procedure Laterality Date    ANESTHESIA OUT OF OR MRI N/A 10/28/2024    Procedure: 1.5 MRI Brain;  Surgeon: GENERIC ANESTHESIA PROVIDER;  Location: [facility] OR    ROTATOR CUFF REPAIR RT/LT Right 2021       Medications   Current Facility-Administered Medications   Medication Dose Route Frequency Provider Last Rate Last Admin    acetaminophen (TYLENOL) tablet 650 mg  650 mg Oral Q4H PRN Nikki Caceres MD   650 mg at 11/14/24 0613    alum & mag hydroxide-simethicone (MAALOX) suspension 30 mL  30 mL Oral Q4H PRN Nikki Caceres MD   30 mL at 10/17/24 0837    amLODIPine (NORVASC) tablet 10 mg  10 mg Oral Daily Presley Barrera MD   10 mg at 11/27/24 0819    atorvastatin (LIPITOR) tablet 40 mg  40 mg Oral Daily Nikki Caceres MD   40 mg at 11/27/24 0828    cholecalciferol (VITAMIN D3) capsule 1,250 mcg  1,250 mcg Oral Q7 Days Evelia Grimm DO   1,250 mcg at 11/26/24 1526    cinacalcet (SENSIPAR) tablet 30 mg  30 mg Oral Daily Presley Barrera MD   30 mg at 11/27/24 0819    cloNIDine (CATAPRES) tablet 0.1 mg  0.1 mg Oral BID Presley Barrera MD   0.1 mg at 11/27/24 0823    furosemide (LASIX) tablet 40 mg  40 mg Oral Daily Presley Barrera MD   40 mg at 11/27/24 0819    gabapentin (NEURONTIN) capsule 100 mg  100 mg Oral Q6H PRN Nikki Caceres MD   100 mg at 11/15/24 0418    hydrocortisone (CORTAID) 0.5 % cream   Topical Daily PRN Savi Chamorro MD        lisinopril (ZESTRIL) tablet 40 mg  40 mg Oral  Daily Presley Barrera MD   40 mg at 11/27/24 0823    melatonin tablet 3 mg  3 mg Oral At Bedtime Presley Barrera MD   3 mg at 11/26/24 2028    memantine (NAMENDA) tablet 5 mg  5 mg Oral Daily Savi Chamorro MD   5 mg at 11/27/24 0836    metoprolol succinate ER (TOPROL XL) 24 hr tablet 50 mg  50 mg Oral Daily Presley Barrera MD   50 mg at 11/27/24 0823    nicotine (NICODERM CQ) 14 MG/24HR 24 hr patch 1 patch  1 patch Transdermal Daily Evelia Grimm DO   1 patch at 11/27/24 0846    nicotine (NICODERM CQ) 21 MG/24HR 24 hr patch 1 patch  1 patch Transdermal Daily PRN Britt Kang MD   1 patch at 10/13/24 1611    nicotine (NICORETTE) gum 2 mg  2 mg Buccal Q1H PRN González Garcia MD   2 mg at 10/24/24 1254    OLANZapine (zyPREXA) tablet 5 mg  5 mg Oral TID PRN Evelia Grimm DO   5 mg at 11/24/24 0436    Or    OLANZapine (zyPREXA) injection 5 mg  5 mg Intramuscular TID PRN Evelia Grimm DO        OLANZapine zydis (zyPREXA) ODT tab 5 mg  5 mg Oral At Bedtime González Garcia MD   5 mg at 11/26/24 2027    polyethylene glycol (MIRALAX) Packet 17 g  17 g Oral Daily PRN Nikki Caceres MD        traZODone (DESYREL) half-tab 25 mg  25 mg Oral At Bedtime PRN Evelia Grimm DO   25 mg at 11/21/24 0254    Or    traZODone (DESYREL) tablet 50 mg  50 mg Oral At Bedtime PRN Evelia Grimm DO              Physical Exam   Vital Signs: Temp: 97.5  F (36.4  C)   BP: 119/74 Pulse: 54     SpO2: 96 % O2 Device: None (Room air)    Weight: 237 lbs 1.6 oz    Exam:  General: Appears stated age, no acute distress  Head: Normocephalic.   Cardiovascular: S1/S2, Normal rate and rhythm, 2-3+ lower extremity edema, L>R   Respiratory: Normal respiratory effort, lung fields grossly clear to auscultation  Musculoskeletal: extremities normal- no gross deformities noted, gait normal, and normal muscle tone  Neuropsych: Speaks clearly, answers questions appropriately, motor/sensory innervation of extremities grossly intact         Medical Decision  Making       45 MINUTES SPENT BY ME on the date of service doing chart review, history, exam, documentation & further activities per the note.

## 2024-11-27 NOTE — PLAN OF CARE
Problem: Psychotic Signs/Symptoms  Goal: Improved Behavioral Control (Psychotic Signs/Symptoms)  Outcome: Progressing   Goal Outcome Evaluation:    Plan of Care Reviewed With: patient        Patient was disoriented and confused; was irritable and rude when staff attempted to redirect.  Initially refused bed time medications, including his Ortega( Zyprexa 5 mg), but eventually took it from a different staff. Told writer to Shove  the medications up his  his ass. Appeared  anxious suspicious of staff, Thinks staff was out to drug him. Denied all psych symptoms and refused vitals. Will continue to monitor and encourage.

## 2024-11-27 NOTE — PLAN OF CARE
Problem: Sleep Disturbance  Goal: Adequate Sleep/Rest  Outcome: Progressing   Patient sleeping at beginning of the shift. Patient declined to take his HS medication. VS Blood pressure (!) 140/95, pulse 57, temperature 97.5  F (36.4  C), resp. rate 16, weight 107.5 kg (237 lb 1.6 oz), SpO2 95%.  Patient encouraged to take his medications by this writer-patient agreed to take all his medications without any problem.Patient sleeps on and off. Patient using inappropriate words at times - easy to redirect. . Patient has edema on BLE-encouraged to elevate legs on pillows- compliant. Patient continuous on SIO 1:1 on noc and evening shift.Patient appears to have slept for  5.75 hours. Will continue with current plan of care.

## 2024-11-27 NOTE — CARE PLAN
Rehab Group    Start time: 1015  End time: 1100  Patient time total: 35 minutes    attended partial group    #4 attended   Group Type: occupational therapy   Group Topic Covered: activity therapy and social skills     Group Session Detail:  Occupation-based group focused on identifying and expressing gratitude towards support network.        Patient Response/Contribution:  attentive, disorganized, and appeared confused      Patient Detail:  Pt initially reported having a difficult time getting organized. Perseverated on this thought when asked different questions (ie. What his goal is for the day and who he is thankful for). Unable to clearly identify a significant support. Opted to color vs create a handmade card.       [id number] OT Group (2 or more in attendance)      Patient Active Problem List   Diagnosis    Hyperlipidemia LDL goal <130    Hypertension goal BP (blood pressure) < 130/80    Hypercalcemia    Hyperparathyroidism (H)    Thalassemia, unspecified type    Psychosis, unspecified psychosis type (H)    Acute psychosis (H)

## 2024-11-27 NOTE — PLAN OF CARE
"    Problem: Adult Behavioral Health Plan of Care  Goal: Plan of Care Review  Outcome: Progressing  Flowsheets  Taken 11/27/2024 1206  Plan of Care Reviewed With: patient  Taken 11/27/2024 0823  Patient Agreement with Plan of Care: agrees  Goal: Patient-Specific Goal (Individualization)  Description: You can add care plan individualizations to a care plan. Examples of Individualization might be:  \"Parent requests to be called daily at 9am for status\", \"I have a hard time hearing out of my right ear\", or \"Do not touch me to wake me up as it startles  me\".  Outcome: Progressing  Goal: Adheres to Safety Considerations for Self and Others  Outcome: Progressing  Goal: Absence of New-Onset Illness or Injury  Outcome: Progressing  Intervention: Prevent VTE (Venous Thromboembolism)  Recent Flowsheet Documentation  Taken 11/27/2024 0823 by Vera Graham RN  VTE Prevention/Management: SCDs off (sequential compression devices)  Intervention: Prevent Infection  Recent Flowsheet Documentation  Taken 11/27/2024 0823 by Vera Graham RN  Infection Prevention: other (see comments)  Goal: Optimized Coping Skills in Response to Life Stressors  Outcome: Progressing  Intervention: Promote Effective Coping Strategies  Recent Flowsheet Documentation  Taken 11/27/2024 0823 by Vera Graham RN  Supportive Measures:   active listening utilized   self-care encouraged   self-reflection promoted   self-responsibility promoted   verbalization of feelings encouraged  Goal: Develops/Participates in Therapeutic [place] to Support Successful Transition  Outcome: Progressing  Intervention: Foster Therapeutic [place]  Recent Flowsheet Documentation  Taken 11/27/2024 0823 by Vera Graham RN  Trust Relationship/Rapport:   choices provided   emotional support provided   empathic listening provided   reassurance provided   thoughts/feelings acknowledged   questions answered   care explained       Goal Outcome " Evaluation:    Plan of Care Reviewed With: patient     Patient was at the launch area at the start of shift. He was sleeping on and off. Upon approach patient was pleasant. He denied pain. He denied all MH psych symptoms and contracted for safety. His hygiene was appropriate. His fluid and food intake was adequate; patient ate 100% of his meals. He is medication compliant, though at first he would not take the medication from this writer. He eventually took the medication from another staff with no hesitation. Patient continues to have BLE edema. Ultra sound was ordered for BLE. The patient was taken to [facility] accompanied by the [facility] team and security.  There was no disruptive behavior this shift. Will continue the current plan of care and notify the MD of any changes.

## 2024-11-27 NOTE — PLAN OF CARE
Team Note Due:  Monday    Assessment/Intervention/Current Symtoms and Care Coordination:  Chart review and met with team, discussed pt progress, symptomology, and response to treatment.  Discussed the discharge plan and any potential impediments to discharge. Clifford Aaron is currently emergency guardian/conservator until 01/20/2025.    Discharge Plan or Goal:  Memory care facility     Barriers to Discharge:  Patient requires further psychiatric stabilization due to current symptomology, medication management with changes subject to provider, coordination with outside supports, and aftercare planning. Pt is under civil commitment.     Referral Status:    Memory Care facilities currently in process:  [facility]. Tour scheduled 11/27.  [facility]. Tour scheduled 11/29.  [facility]. Tour scheduled 11/29.  [facility]. Tour scheduled 11/30.    Memory Care facilities pending family review:  [facility].   [facility].  [facility].  [facility].  [facility].  [facility].  [facility].    Memory Care facilities declined:  [facility] (private pay only, no EW).  [facility] (private pay only, no EW).  [facility] (no openings).     Legal Status:  MI Commitment with [facility]: [place]  File Number: [id number]  Start and expiration date of commitment: 10/24/24 - 04/24/25    [facility] meds: Haldol, Clozaril, Risperdal, Invega, Zyprexa, Seroquel, Abilify    PPS/CM:  Shelby Chowdary: [phone]  [email or web address]    Contacts:  Maria C Aaron (Daughter): [phone]   Clifford Aaron (ex-wife): [phone]     No Del Angel (guardianship/conservatorship ): [phone]  [email or web address]     Upcoming Meetings and Dates/Important Information  and next steps:  Follow up with family regarding list of Memory Care facilities  Discharge planning when appropriate  Pt does not qualify for MA per financial counselor on 11/8/2024. Pt will likely privately pay for Memory Care until he qualifies for MA/waivers in the future.  Pt will need to apply for MA before a MNChoices Assessment/SMRT can be completed for waivers.    Provisional Discharge and Change of Status needed at discharge

## 2024-11-27 NOTE — PROGRESS NOTES
----------------------------------------------------------------------------------------------------------  [facility]  Psychiatry Progress Note  Hospital Day #46     Interim History:     The patient's care was discussed with the treatment team and chart notes were reviewed.    Vitals: VSS  Sleep: 3.75 hours (11/26/24 0634)  Scheduled medications: Took all scheduled medications as prescribed  Psychiatric PRN medications:   none  Staff Report:   Patient was disoriented and confused; was irritable and rude when staff attempted to redirect.  Initially refused bed time medications, including his Ortega( Zyprexa 5 mg), but eventually took it from a different staff.  Thinks staff was out to drug him.   Denied all psych symptoms and refused vitals.     - Please see staff notes for details.      Subjective:     Patient Interview:  Patient is interviewed in the milieu with a piece of paper that had a couple phone numbers. Patient reports he was trying to call a person called 'Juliano,' on the phone, a , and 'Aby, a mechanical lady.' He says they were supposed to show up here last evening but they did not come. He says he has Maria C, his daughter on the phone and wants to know if there has been any harm to any of his cars. Patient reports medications are going 'just fine,' but wants to be off all of the medications except his blood pressure medications. Patient does not feel like anyone is out to get him here on the unit 'anymore.' He states he is not afraid of anyone, says 'just be yourself.'     He endorses intermittent throbbing, pain, achiness in L ankle and distal tibial area, started as was watching movie last night. Denies trauma to the area. No pain in R leg. Asks if there is something he can take for the pain. Patient denies any uncomfortable sensation in his hands.     ROS:  See above     Objective:     Vitals:  BP (!) 140/95 (BP Location: Left arm)   Pulse  57   Temp 97.5  F (36.4  C)   Resp 16   Wt 107.5 kg (237 lb 1.6 oz)   SpO2 95%   BMI 38.27 kg/m      Allergies:  No Known Allergies    Current Medications:  Scheduled:  Current Facility-Administered Medications   Medication Dose Route Frequency Provider Last Rate Last Admin    acetaminophen (TYLENOL) tablet 650 mg  650 mg Oral Q4H PRN Nikki Caceres MD   650 mg at 11/14/24 0613    alum & mag hydroxide-simethicone (MAALOX) suspension 30 mL  30 mL Oral Q4H PRN Nikki Caceres MD   30 mL at 10/17/24 0837    amLODIPine (NORVASC) tablet 10 mg  10 mg Oral Daily Presley Barrera MD   10 mg at 11/26/24 0816    atorvastatin (LIPITOR) tablet 40 mg  40 mg Oral Daily Nikki Caceres MD   40 mg at 11/26/24 0816    cholecalciferol (VITAMIN D3) capsule 1,250 mcg  1,250 mcg Oral Q7 Days Evelia Grimm DO   1,250 mcg at 11/26/24 1526    cinacalcet (SENSIPAR) tablet 30 mg  30 mg Oral Daily Presley Barrera MD   30 mg at 11/26/24 0816    cloNIDine (CATAPRES) tablet 0.1 mg  0.1 mg Oral BID Presley Barrera MD   0.1 mg at 11/26/24 2027    furosemide (LASIX) tablet 40 mg  40 mg Oral Daily Presley Barrera MD   40 mg at 11/26/24 0817    gabapentin (NEURONTIN) capsule 100 mg  100 mg Oral Q6H PRN Nikki Caceres MD   100 mg at 11/15/24 0418    hydrocortisone (CORTAID) 0.5 % cream   Topical Daily PRN Savi Chamorro MD        lisinopril (ZESTRIL) tablet 40 mg  40 mg Oral Daily Presley Barrera MD   40 mg at 11/26/24 0816    melatonin tablet 3 mg  3 mg Oral At Bedtime Presley Barrera MD   3 mg at 11/26/24 2028    memantine (NAMENDA) tablet 5 mg  5 mg Oral Daily Savi Chamorro MD   5 mg at 11/26/24 0817    metoprolol succinate ER (TOPROL XL) 24 hr tablet 50 mg  50 mg Oral Daily Presley Barrera MD   50 mg at 11/26/24 0817    nicotine (NICODERM CQ) 14 MG/24HR 24 hr patch 1 patch  1 patch Transdermal Daily Evelia Grimm DO   1 patch at 11/26/24 0817    nicotine (NICODERM CQ) 21 MG/24HR 24 hr patch 1 patch  1 patch  Transdermal Daily PRN Britt Kang MD   1 patch at 10/13/24 1611    nicotine (NICORETTE) gum 2 mg  2 mg Buccal Q1H PRN González Garcia MD   2 mg at 10/24/24 1254    OLANZapine (zyPREXA) tablet 5 mg  5 mg Oral TID PRN Evelia Grimm DO   5 mg at 11/24/24 0436    Or    OLANZapine (zyPREXA) injection 5 mg  5 mg Intramuscular TID PRN Evelia Grimm DO        OLANZapine zydis (zyPREXA) ODT tab 5 mg  5 mg Oral At Bedtime González Garcia MD   5 mg at 11/26/24 2027    polyethylene glycol (MIRALAX) Packet 17 g  17 g Oral Daily PRN Nikki Caceres MD        traZODone (DESYREL) half-tab 25 mg  25 mg Oral At Bedtime PRN Evelia Grimm DO   25 mg at 11/21/24 0254    Or    traZODone (DESYREL) tablet 50 mg  50 mg Oral At Bedtime PRN Evelia Grimm DO           PRN:  Current Facility-Administered Medications   Medication Dose Route Frequency Provider Last Rate Last Admin    acetaminophen (TYLENOL) tablet 650 mg  650 mg Oral Q4H PRN Nikki Caceres MD   650 mg at 11/14/24 0613    alum & mag hydroxide-simethicone (MAALOX) suspension 30 mL  30 mL Oral Q4H PRN Nikki Caceres MD   30 mL at 10/17/24 0837    amLODIPine (NORVASC) tablet 10 mg  10 mg Oral Daily Presley Barrera MD   10 mg at 11/26/24 0816    atorvastatin (LIPITOR) tablet 40 mg  40 mg Oral Daily Nikki Caceres MD   40 mg at 11/26/24 0816    cholecalciferol (VITAMIN D3) capsule 1,250 mcg  1,250 mcg Oral Q7 Days Evelia Grimm DO   1,250 mcg at 11/26/24 1526    cinacalcet (SENSIPAR) tablet 30 mg  30 mg Oral Daily Presley Barrera MD   30 mg at 11/26/24 0816    cloNIDine (CATAPRES) tablet 0.1 mg  0.1 mg Oral BID Presley Barrera MD   0.1 mg at 11/26/24 2027    furosemide (LASIX) tablet 40 mg  40 mg Oral Daily Presley Barrera MD   40 mg at 11/26/24 0817    gabapentin (NEURONTIN) capsule 100 mg  100 mg Oral Q6H PRN Nikki Caceres MD   100 mg at 11/15/24 0418    hydrocortisone (CORTAID) 0.5 % cream   Topical Daily PRN Savi Chamorro MD        lisinopril  (ZESTRIL) tablet 40 mg  40 mg Oral Daily Presley Barrera MD   40 mg at 11/26/24 0816    melatonin tablet 3 mg  3 mg Oral At Bedtime Presley Barrera MD   3 mg at 11/26/24 2028    memantine (NAMENDA) tablet 5 mg  5 mg Oral Daily Savi Chamorro MD   5 mg at 11/26/24 0817    metoprolol succinate ER (TOPROL XL) 24 hr tablet 50 mg  50 mg Oral Daily Presley Barrera MD   50 mg at 11/26/24 0817    nicotine (NICODERM CQ) 14 MG/24HR 24 hr patch 1 patch  1 patch Transdermal Daily Evelia Grimm DO   1 patch at 11/26/24 0817    nicotine (NICODERM CQ) 21 MG/24HR 24 hr patch 1 patch  1 patch Transdermal Daily PRN Britt Kang MD   1 patch at 10/13/24 1611    nicotine (NICORETTE) gum 2 mg  2 mg Buccal Q1H PRN González Garcia MD   2 mg at 10/24/24 1254    OLANZapine (zyPREXA) tablet 5 mg  5 mg Oral TID PRN Evelia Grimm DO   5 mg at 11/24/24 0436    Or    OLANZapine (zyPREXA) injection 5 mg  5 mg Intramuscular TID PRN Evelia Grimm DO        OLANZapine zydis (zyPREXA) ODT tab 5 mg  5 mg Oral At Bedtime González Garcia MD   5 mg at 11/26/24 2027    polyethylene glycol (MIRALAX) Packet 17 g  17 g Oral Daily PRN Nikki Caceres MD        traZODone (DESYREL) half-tab 25 mg  25 mg Oral At Bedtime PRN Evelia Grimm DO   25 mg at 11/21/24 0254    Or    traZODone (DESYREL) tablet 50 mg  50 mg Oral At Bedtime PRN Evelia Grimm DO         Labs and Imaging:  Data this admission:  - CBC unremarkable  - CMP unremarkable  - TSH normal  - UDS negative  - Vit D low  - Hgb A1c 5.9 (10/13/24)  - Lipids unremarkable  - Vit B12 normal  - Folate normal  - Urinalysis unremarkable  - EKG normal sinus rhythm, QTc 390 ms  - Head CT showed no acute changes  - HIV non reactive  - Treponema antibody non reactive  - ESR wnl   - Ceruloplasmin wnl   - BOOGIE negative  - Lyme negative      Parathyroid hormone:   10/13: 85     Calcium:  10/14: 11.4  10/16: 10.3  10/17: 10.3  10/19: 9.8  10/21: 10.6  10/23: 10.3     Albumin:  10/14: 4.3  10/16: 4.3  10/17:  "4.1  10/19: 4.2  10/21: 4.5  10/23: 4.3      CXR:   Impression:   1. No definite radiographic evidence of asbestosis. If clinically  indicated, consider evaluation with high resolution chest CT.  2. Nonspecific left costophrenic blunting. Given symmetrically low  lung volumes may be related to poor inspiratory effort/timing.  3. Pulmonary vascular congestion.     MRI:   IMPRESSION:  1. No acute intracranial pathology.  2. No focal lesion or structural abnormality.   3. Symmetric frontoparietal cortical volume loss slightly more than  expected for age.     Mental Status Exam:   Oriented to: Oriented to self but not time and place  General:  Awake and Alert  Appearance:  appears stated age, Grooming is adequate, and Dressed in his own clothes  Behavior/Attitude:  Calm and Easily distracted  Eye Contact: Appropriate for conversation, downcast at times  Psychomotor: No evidence of tics, dystonia, or tardive dyskinesia  no catatonia present  Speech:  appropriate volume/tone  Language: Fluent in English with appropriate syntax and vocabulary.  Mood:  \"I don't know\"  Affect:  confused  Thought Process:  disorganized and confused  Thought Content:   SI/HI/ delusion of confusion  . Patient denies paranoia  Associations:  loose  Insight:  limited   Judgment:  limited   Impulse control: limited  Attention Span:   decreased  Concentration:   distractible  Recent and Remote Memory:   remote memory intact, recent memory impaired  Fund of Knowledge: average  Muscle Strength and Tone: normal  Gait and Station: Normal     Psychiatric Assessment     Estevan Aaron is a 64 year old male with previous psychiatric diagnoses of GEORGINA admitted from the ER on 10/12/2024 due to concern for HI and psychosis in the context of medical issues (hyperthyroidism, hypercalcemia), psychosocial stressors including with recent divorce and selling his home. This is the patient's first psychiatric hospitalization. Significant symptoms on admission included " delusions of persecution with grandiose beliefs, homicidal ideations, as well as disorganized thinking and behavior. The MSE on admission was pertinent for a thought process which was perseverative, circumstantial, tangential, disorganized and tangential; with rambling and looseness of associations. Psychological contributions to presentation included lack of insight. Social factors contributing to presentation included isolation, recent divorce, selling his house, and moving from hotel to hotel. Biological contributions to presentation included a history of hyperparathyroidism with chronic hypercalcemia per charts as well as a history of methamphetamine use per collateral from ex-wife.     History was difficult to obtain due to the patient's severe disorganized thinking on interview; he was observed with persecutory delusions pertaining to the realtor and gang members, disorganized behavior as these delusions have led him to flee to different hotels to stay safe/ has called  complaining of being targeted and auditory hallucinations of voices for the past 12 months. Per collateral from ex-wife, Santos has had paranoid ideations since they first met. He has always felt like people are out to get him or trying to rip him off. She says that he has had visual hallucinations (he will point things out that the rest of the family can't see) for a long time, but the family would just go along with him to avoid making him angry. This timeline and presentation could be consistent with diagnosis of paranoid personality disorder. Things began to worsen around the beginning of the COVID pandemic, when Santos became more isolated and the family started to notice cognitive issues such as impaired memory. Immediately prior to this hospitalization, the ex-wife found Santos in a hotel room with a generator full of gasoline, binoculars, and hunting equipment. This time course does not suggest acute psychosis, however given the patient has  never had a full psychiatric work-up, we completed a first-episode psychosis work-up.      Other things that could be contributing to his presentation is hyperparathyroidism with hypercalcemia. However, patient's calcium returned to normal limits on 10/16, and patient continues to have psychosis so likely not a significant contributing factor to patient's presentation.      Patient also continues to be disoriented and his behaviors appear to worsen in the evenings. This raised concern for underlying neurocognitive disorder with delirium. Patient received MRI brain with and without contrast which showed frontal and parietal atrophy greater than would be expected for patient's age. This is consistent with neurocognitive disorder. Patient has continued to improve with decreasing his antipsychotic. Working to get patient transferred to geriatric psychiatry in the short term and to memory care long term.      Given that he currently has psychosis, patient warrants inpatient psychiatric hospitalization to maintain his safety.     Psychiatric Plan by Diagnosis   Today's changes:   None    # Major Neurocognitive Disorder  # Rule out paranoid personality disorder  1. Medications:  - Olanzapine 5 mg at bedtime  - Memantine 5 mg, daily  - Neurology consult 11/8/24, recommend outpatient follow-up and neuropsychiatric testing     2. Pertinent Labs/Monitoring:   - See above     3. Additional Plans:  - Patient will be treated in therapeutic milieu with appropriate individual and group therapies as described     # Unspecified anxiety vs Generalized Anxiety Disorder  - Monitor for symptoms.  - Fluoxetine held due to suspicion of ongoing manic symptoms     Psychiatric Hospital Course:      Estevan Aaron was admitted to [facility] on a 72 hour hold.   Medications:  PTA fluoxetine was held due to concern for worsening of zelalem   New medications started at the time of admission include Zyprexa.   Increased olanzapine 10 mg at bedtime  was to 10 mg BID (10/14)   Increased olanzapine 10 mg BID to 10 mg during day and 15 mg at bedtime (10/15)  10/21: increased olanzapine pm dose from 15 to 20 mg  10/23: started melatonin 3 mg at 7 pm to help patient with circadian rhythm as he has been staying up throughout the night and sleeping a lot during the day and there is some concern for delirium   10/24: consulted anesthesia for MRI brain   10/28: MRI brain complete, decrease AM olanzapine from 10 to 5 mg  10/29: Morning olanzapine decreased to 2.5 mg, evening olanzapine decreased to 15 mg   11/1: Decreased evening olanzapine to 10 mg   11/4: Decreased evening olanzapine to 7.5 mg   11/5: Decreased evening olanzapine to 5 mg   11/11: Moved morning dose of olanzapine to the afternoon as patient appears more agitated in the afternoons/evenings  11/21: Started memantine 5 mg daily   11/26: Discontinued olanzapine 2.5 mg during the afternoon     Care conference 10/18/24 with daughter Maria C and ex-wife Clifford  - Report that patient has always been paranoid since ex-wife first met him. She describes him always feeling like he is getting ripped off.   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 They reported once incidence in which he made a bid on a job and the client paid him, but he never ended up finishing the job  - Wife and him  officially this year, and since selling the house several months ago, patient has been moving from hotel to hotel due to thinking people are out to get him   - When they were selling their house,  patient threatened realtors and felt he was being ripped off   - Clifford reports that she started to be afraid to be around him alone  - When he was found in the hotel, he had a generator full of gasoline, binoculars, bullets, and hunting equipment.     10/21/24: updated daughter on Santos's treatment plan      10/23/24: updated daughter on Santos's treatment plan      10/25/24: updated daughter on Santos's treatment plan, including plan to try and get MRI brain done under sedation. She said that Santos's grandfather had dementia and his sister has a brain tumor.      10/28/24: updated daughter on Santos's MRI results. She is planning on coming into town soon.      Care conference 11/1/24 with daughters Maria C and Estefani and ex-wife Clifford  - Discussed dementia diagnosis   - Clifford asked about plans moving forward. Explained that applying for medical assistance is the first step. Explained that application processing time can be very variable. Informed family that Santos will most likely be staying on this inpatient unit during this time.   - Clifford expressed that their family would like to be the power of /have conservatorship for Santos.   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 Reports that he has bills that he has not paid.   - Maria C is planning to move to [place], and Clifford currently lives in [place]. Family prefer that Santos would in a facility that are near these locations. Discussed with family that they can also try to request a specific location (memory care).   - Clifford reports that he had previously gotten help applying for his social security and medicare, but unsure if it had been approved.   - Informed family that there is a geriatric unit and there might be a transfer if there becomes availability on this unit.   - Family shared that he was completely different just two months ago.   - Estefani shared that his family found his medications in  his old truck recently, expressed that it was likely that he was not taking his medications prior to his hospitalization.      11/6/24: updated Maria C on plan to try and transfer Santos to Geriatric unit.      11/11/24: updated Maria C on neurology consult      The risks, benefits, alternatives, and side effects were discussed and understood by the patient and other caregivers.     Medical Assessment and Plan     Medical diagnoses to be addressed this admission:    # Hypertension  - Continue PTA medications  Furosemide 40 mg daily  Lisinopril 40 mg daily  Metoprolol 50 mg daily  Amlodipine 10 mg daily  Clonidine 0.1 mg BID     # Hyperlipidemia  - Continue PTA Atorvastatin 40 mg     # Hyperparathyroidism  # Hypercalcemia, hypophosphoremia   Increased Ca level to 10.9 and decreased Ph level to 2.3 in the ED. Suspicion patient's hypercalcemia could be contributing to symptoms of psychosis.  - Consulted endocrinology, who started cinacalcet 30 mg BID on 10/13  - 10/16 endocrinology recommended continuing to trend calcium and albumin to make sure patient does not become hypocalcemic and recommended holding cinacalcet   - 10/17, calcium and albumin wnl, endo recommended cinacalcet 30 mg once daily   - 10/21, per endo continue cincaclcet and recheck calcium and albumin on October 24  - 10/23: per endo, patient needs to follow up outpatient for further management of hyperparathyroidism      Medical course: Patient was physically examined by the ED prior to being transferred to the unit and was found to be medically stable and appropriate for admission.      Consults: Psychiatry, Endocrinology (follow-up of hyperthyroidism / hypercalcemia and hypertension), neurology     Checklist     Legal Status: Committed     Safety Assessment:   Behavioral Orders   Procedures    Assault precautions    Code 1 - Restrict to Unit    Routine Programming     As clinically indicated    Status 15     Every 15 minutes.    Status Individual Observation      1:1 during evening shift, & night shift.    If patient refuses overnight presence of staff in his room, it's ok to do 15 min checks through the window blinds.    Patient SIO status reviewed with team/RN.  Please also refer to RN/team documentation for add'l detail.    -SIO staff to monitor following which have contributed to patient being on SIO:  Pt has psychosis regarding being followed and attacked, very paranoid, HI    -Possible interventions SIO staff could use to support patient's treatment progress:  Redirect and reassure  -When following observed, team will review discontinuation of SIO:  Psychosis improves     Order Specific Question:   CONTINUOUS 24 hours / day     Answer:   Other     Order Specific Question:   Specify distance     Answer:   10 feet, 5 feet when close to the doors     Order Specific Question:   Indications for SIO     Answer:   Assault risk     Order Specific Question:   Indications for SIO     Answer:   Severe intrusiveness       Risk Assessment:  Risk for harm is elevated.  Risk factors: impulsive and past behaviors  Protective factors: family      Disposition: Pending stabilization, medication optimization, & development of a safe discharge plan.     Attestations     Resident without student: This patient was seen and discussed with my attending physician.  [facility]  Psychiatry Resident Physician    Attestation:  This patient has been seen and evaluated by me, Pete Smith MD.  I have discussed this patient with the house staff team including the resident and/or medical student and I agree with the findings and plan in this note.    I have reviewed today's vital signs, medications, labs and imaging. Pete Smith MD , PhD.

## 2024-11-27 NOTE — PLAN OF CARE
BEH IP Unit Acuity Rating Score (UARS)  Patient is given one point for every criteria they meet.    CRITERIA SCORING   On a 72 hour hold, court hold, committed, stay of commitment, or revocation. 1    Patient LOS on BEH unit exceeds 20 days. 1  LOS: 46   Patient under guardianship, 55+, otherwise medically complex, or under age 11. 1   Suicide ideation without relief of precipitating factors. 0   Current plan for suicide. 0   Current plan for homicide. 0   Imminent risk or actual attempt to seriously harm another without relief of factors precipitating the attempt. 1   Severe dysfunction in daily living (ex: complete neglect for self care, extreme disruption in vegetative function, extreme deterioration in social interactions). 1   Recent (last 7 days) or current physical aggression in the ED or on unit. 0   Restraints or seclusion episode in past 72 hours. 0   Recent (last 7 days) or current verbal aggression, agitation, yelling, etc., while in the ED or unit. 0   Active psychosis. 1   Need for constant or near constant redirection (from leaving, from others, etc).  0   Intrusive or disruptive behaviors. 1   Patient requires 3 or more hours of individualized nursing care per 8-hour shift (i.e. for ADLs, meds, therapeutic interventions). 1   TOTAL 8

## 2024-11-28 PROCEDURE — 250N000013 HC RX MED GY IP 250 OP 250 PS 637

## 2024-11-28 PROCEDURE — 124N000002 HC R&B MH UMMC

## 2024-11-28 PROCEDURE — H2032 ACTIVITY THERAPY, PER 15 MIN: HCPCS

## 2024-11-28 RX ADMIN — LISINOPRIL 40 MG: 10 TABLET ORAL at 08:00

## 2024-11-28 RX ADMIN — ATORVASTATIN CALCIUM 40 MG: 20 TABLET, FILM COATED ORAL at 08:01

## 2024-11-28 RX ADMIN — CINACALCET 30 MG: 30 TABLET ORAL at 08:01

## 2024-11-28 RX ADMIN — Medication 3 MG: at 20:31

## 2024-11-28 RX ADMIN — METOPROLOL SUCCINATE 50 MG: 50 TABLET, EXTENDED RELEASE ORAL at 08:01

## 2024-11-28 RX ADMIN — FUROSEMIDE 40 MG: 40 TABLET ORAL at 08:01

## 2024-11-28 RX ADMIN — Medication 1 PATCH: at 08:11

## 2024-11-28 RX ADMIN — MEMANTINE 5 MG: 5 TABLET ORAL at 08:01

## 2024-11-28 RX ADMIN — CLONIDINE HYDROCHLORIDE 0.1 MG: 0.1 TABLET ORAL at 20:32

## 2024-11-28 RX ADMIN — AMLODIPINE BESYLATE 10 MG: 5 TABLET ORAL at 08:00

## 2024-11-28 RX ADMIN — OLANZAPINE 5 MG: 5 TABLET, ORALLY DISINTEGRATING ORAL at 20:32

## 2024-11-28 RX ADMIN — CLONIDINE HYDROCHLORIDE 0.1 MG: 0.1 TABLET ORAL at 08:01

## 2024-11-28 ASSESSMENT — ACTIVITIES OF DAILY LIVING (ADL)
ADLS_ACUITY_SCORE: 74

## 2024-11-28 NOTE — PROGRESS NOTES
Rehab Group    Start time: 1015  End time: 1115  Patient time total: 45 minutes    attended full group    #6 attended   Group Type: art   Group Topic Covered: activity therapy       Group Session Detail:  Art Therapy directive was to create a peaceful/safe place drawing in response to a guided imagery relaxation script read by author. Pts were encouraged to identify the five senses within their peaceful/safe place.  Goals of directive: emotional expression, emotional regulation, trauma containment, relaxation.     Patient Response/Contribution:  cooperative with task       Patient Detail:    Pt was focused on task for the first half of time spent in group today. Pt appeared disorganized during the second half of group and was observed putting the wrong caps on various markers. Pt appeared to be trying to organize markers.  Pt briefly shared his drawing with author and group. Pt created a free hand drawing of a horse and talked about how he loved horses when he was young and still would like to be around horses if it were an option.  Pts mood was calm.      Activity Therapy Per 15 min ()      Patient Active Problem List   Diagnosis    Hyperlipidemia LDL goal <130    Hypertension goal BP (blood pressure) < 130/80    Hypercalcemia    Hyperparathyroidism (H)    Thalassemia, unspecified type    Psychosis, unspecified psychosis type (H)    Acute psychosis (H)

## 2024-11-28 NOTE — PLAN OF CARE
"  Problem: Adult Behavioral Health Plan of Care  Goal: Plan of Care Review  Outcome: Progressing  Flowsheets  Taken 11/28/2024 1036  Plan of Care Reviewed With: patient  Taken 11/28/2024 1000  Patient Agreement with Plan of Care: agrees  Goal: Patient-Specific Goal (Individualization)  Description: You can add care plan individualizations to a care plan. Examples of Individualization might be:  \"Parent requests to be called daily at 9am for status\", \"I have a hard time hearing out of my right ear\", or \"Do not touch me to wake me up as it startles  me\".  Outcome: Progressing  Goal: Adheres to Safety Considerations for Self and Others  Outcome: Progressing  Intervention: Develop and Maintain Individualized Safety Plan  Recent Flowsheet Documentation  Taken 11/28/2024 1000 by Vera Graham RN  Safety Measures:   environmental rounds completed   safety rounds completed  Goal: Absence of New-Onset Illness or Injury  Outcome: Progressing  Intervention: Identify and Manage Fall Risk  Recent Flowsheet Documentation  Taken 11/28/2024 1000 by Vera Graham RN  Safety Measures:   environmental rounds completed   safety rounds completed  Intervention: Prevent VTE (Venous Thromboembolism)  Recent Flowsheet Documentation  Taken 11/28/2024 1000 by Vera Graham RN  VTE Prevention/Management: SCDs off (sequential compression devices)  Intervention: Prevent Infection  Recent Flowsheet Documentation  Taken 11/28/2024 1000 by Vera Graham RN  Infection Prevention: hand hygiene promoted  Goal: Optimized Coping Skills in Response to Life Stressors  Outcome: Progressing  Intervention: Promote Effective Coping Strategies  Recent Flowsheet Documentation  Taken 11/28/2024 1000 by Vera Graham RN  Supportive Measures:   verbalization of feelings encouraged   positive reinforcement provided   active listening utilized   self-care encouraged   self-reflection promoted   self-responsibility promoted   " relaxation techniques promoted  Goal: Develops/Participates in Therapeutic [place] to Support Successful Transition  Outcome: Progressing  Intervention: Foster Therapeutic [place]  Recent Flowsheet Documentation  Taken 11/28/2024 1000 by Vera Graham RN  Trust Relationship/Rapport:   choices provided   emotional support provided   empathic listening provided   reassurance provided   thoughts/feelings acknowledged   questions answered   care explained       Goal Outcome Evaluation:    Plan of Care Reviewed With: patient     Patient was at the launch area at the start of shift. He was sleeping on and off. He presented with a flat and blunted affect but upon approach patient was pleasant. He denied pain. He denied all MH psych symptoms and contracted for safety. His hygiene was unkempt. His fluid and food intake was adequate; patient ate 100% of his meals. He is medication compliant. Patient attended group art therapy today. He was very pleased with his artwork. He showed off his piece of drawing to the writer and other staff members. There was no behavioral or safety concerns this shift. Will continue the current care plan and support. Patient's VS were per patient's baseline. BP (!) 149/85   Pulse 54   Temp 98.3  F (36.8  C) (Oral)   Resp 16   Wt 108.5 kg (239 lb 3.2 oz)   SpO2 97%   BMI 38.61 kg/m

## 2024-11-28 NOTE — PLAN OF CARE
Problem: Sleep Disturbance  Goal: Adequate Sleep/Rest  Outcome: Progressing   Goal Outcome Evaluation:         Pt was sleeping on and off all night. No sign or symptoms of pain or discomfort noted during 15 minutes safety checks. Pt appeared sleeping comfortable and breathing normally with no distress noted. He woke up some few times and came out to the lounge but was easily redirected back to his room. Pt slept for 4.25 hours. No prn given this shift. No abnormal behavior noted this shift. Will continue to monitor and will assist if need arise.

## 2024-11-28 NOTE — PLAN OF CARE
Problem: Psychotic Signs/Symptoms  Goal: Improved Behavioral Control (Psychotic Signs/Symptoms)  Outcome: Progressing  Intervention: Manage Behavior  Recent Flowsheet Documentation  Taken 11/27/2024 1800 by Pool Urbina RN  De-Escalation Techniques: verbally redirected  Goal: Optimal Cognitive Function (Psychotic Signs/Symptoms)  Outcome: Progressing   Goal Outcome Evaluation:    Plan of Care Reviewed With: patient      Patient spent a significant amount of his time sleeping in the lounge area( dozing on and off).  Attempts to direct him to his room made him irritable/angry. He continue to be disorganized and confused and needs prompting to perform basic task. His thoughts are still scattered and he still occasionally wants to elope from the unit. He refused vitals and refused to answer assessment questions.  Will continue to monitor and encourage.

## 2024-11-28 NOTE — PROGRESS NOTES
Brief medicine progress note    DVT ultrasound of left leg yesterday negative.  Current presentation likely due to dependent edema.  No current shortness of breath, history of heart failure, orthopnea etc. If edema worsens or does not improve, recommend patient follow-up with PCP for possible cardiac etiology.    Medicine will sign off, please page medicine for any additional concerns.    Mohamud Miller PA-C  Hospitalist Service, [facility]  Securely message with [employer] (more info)  Text page via [facility] Paging/Directory   See signed in provider for up to date coverage information

## 2024-11-28 NOTE — PLAN OF CARE
Problem: Psychotic Signs/Symptoms  Goal: Improved Behavioral Control (Psychotic Signs/Symptoms)  Outcome: Progressing  Intervention: Manage Behavior  Recent Flowsheet Documentation  Taken 11/28/2024 1600 by Pool Urbina RN  De-Escalation Techniques: verbally redirected   Goal Outcome Evaluation:    Plan of Care Reviewed With: patient      Patient dozed off  a couple of times while sitting in the lounge; He briefly attend group, but spent most of his time watching tv. Mood was calm and pleasant, but patient continue to be confused and disoriented. He denied all psych symptoms and was extremely receptive and exited with visiting family members. He denied vitals , but ate and hydrated well. Feet and hands are still edematic (see flow sheet)

## 2024-11-29 PROCEDURE — 250N000013 HC RX MED GY IP 250 OP 250 PS 637

## 2024-11-29 PROCEDURE — 124N000002 HC R&B MH UMMC

## 2024-11-29 RX ADMIN — LISINOPRIL 40 MG: 10 TABLET ORAL at 08:13

## 2024-11-29 RX ADMIN — Medication 1 PATCH: at 08:28

## 2024-11-29 RX ADMIN — CLONIDINE HYDROCHLORIDE 0.1 MG: 0.1 TABLET ORAL at 08:13

## 2024-11-29 RX ADMIN — OLANZAPINE 5 MG: 5 TABLET, ORALLY DISINTEGRATING ORAL at 21:30

## 2024-11-29 RX ADMIN — METOPROLOL SUCCINATE 50 MG: 50 TABLET, EXTENDED RELEASE ORAL at 08:13

## 2024-11-29 RX ADMIN — FUROSEMIDE 40 MG: 40 TABLET ORAL at 08:13

## 2024-11-29 RX ADMIN — CINACALCET 30 MG: 30 TABLET ORAL at 08:12

## 2024-11-29 RX ADMIN — MEMANTINE 5 MG: 5 TABLET ORAL at 08:13

## 2024-11-29 RX ADMIN — ATORVASTATIN CALCIUM 40 MG: 20 TABLET, FILM COATED ORAL at 08:12

## 2024-11-29 RX ADMIN — CLONIDINE HYDROCHLORIDE 0.1 MG: 0.1 TABLET ORAL at 21:30

## 2024-11-29 RX ADMIN — Medication 25 MG: at 01:48

## 2024-11-29 RX ADMIN — Medication 3 MG: at 21:30

## 2024-11-29 RX ADMIN — AMLODIPINE BESYLATE 10 MG: 5 TABLET ORAL at 08:13

## 2024-11-29 ASSESSMENT — ACTIVITIES OF DAILY LIVING (ADL)
ADLS_ACUITY_SCORE: 74
LAUNDRY: WITH SUPERVISION
ADLS_ACUITY_SCORE: 74
HYGIENE/GROOMING: INDEPENDENT;PROMPTS
ADLS_ACUITY_SCORE: 74
ORAL_HYGIENE: INDEPENDENT;PROMPTS
ADLS_ACUITY_SCORE: 74
DRESS: INDEPENDENT;PROMPTS
ADLS_ACUITY_SCORE: 74

## 2024-11-29 NOTE — PLAN OF CARE
"  Problem: Adult Behavioral Health Plan of Care  Goal: Optimized Coping Skills in Response to Life Stressors  Outcome: Not Progressing  Intervention: Promote Effective Coping Strategies  Recent Flowsheet Documentation  Taken 11/29/2024 1100 by Rocío Curry RN  Supportive Measures:   verbalization of feelings encouraged   positive reinforcement provided   active listening utilized   self-care encouraged   relaxation techniques promoted     Problem: Sleep Disturbance  Goal: Adequate Sleep/Rest  Outcome: Not Progressing     Problem: Adult Behavioral Health Plan of Care  Goal: Adheres to Safety Considerations for Self and Others  11/29/2024 1236 by Rocío Curry RN  Outcome: Progressing  11/29/2024 1222 by Rocío Curry RN  Outcome: Progressing  Intervention: Develop and Maintain Individualized Safety Plan  Recent Flowsheet Documentation  Taken 11/29/2024 1100 by Rocío Curry RN  Safety Measures:   environmental rounds completed   safety rounds completed     Problem: Suicidal Behavior  Goal: Suicidal Behavior is Absent or Managed  Outcome: Progressing  Intervention: Provide Immediate and Ongoing Protective Physical Environment  Recent Flowsheet Documentation  Taken 11/29/2024 1100 by Rocío Curry RN  Safe Transition Promotion: protective factors promoted   Goal Outcome Evaluation:    Pt is visible in the milieu. Mostly sitting by the lounge area, watching TV,  minimally interactive with peers. Pt nutrition and hydration is adequate. Pt is medication compliant. Pt denies anxiety, depression, SI and hallucinations. Pt denies any pain. His speech/thought  is tangential.He perseverated on discharge.  He is said \" My family is coming to pick me up today\".     "

## 2024-11-29 NOTE — PLAN OF CARE
Problem: Sleep Disturbance  Goal: Adequate Sleep/Rest  Outcome: Progressing    Pt appears to have slept for 3.5 hours. PRN Trazodone was given to promote sleep. PRN medication was effective, as pt was able to sleep after medication administration. Pt denies pain, anxiety, depression, and SI/SIB. Pt is on 1:1 SIO for assault risk and severe intrusiveness. 15 minutes safety checks were in place. Staff will continue to offer support to pt.

## 2024-11-29 NOTE — PLAN OF CARE
Team Note Due:  Monday    Assessment/Intervention/Current Symtoms and Care Coordination:  Chart review and met with team, discussed pt progress, symptomology, and response to treatment.  Discussed the discharge plan and any potential impediments to discharge. Clifford Aaron is currently emergency guardian/conservator until 01/20/2025.    Discharge Plan or Goal:  Memory care facility     Barriers to Discharge:  Patient requires further psychiatric stabilization due to current symptomology, medication management with changes subject to provider, coordination with outside supports, and aftercare planning. Pt is under civil commitment.     Referral Status:    Memory Care facilities currently in process:  [facility]. Tour scheduled 11/27.  [facility]. Tour scheduled 11/29.  [facility]. Tour scheduled 11/29.  [facility]. Tour scheduled 11/30.    Memory Care facilities pending family review:  [facility].   [facility].  [facility].  [facility].  [facility].  [facility].  [facility].    Memory Care facilities declined:  [facility] (private pay only, no EW).  [facility] (private pay only, no EW).  [facility] (no openings).     Legal Status:  MI Commitment with [facility] [place]  File Number: [id number]  Start and expiration date of commitment: 10/24/24 - 04/24/25    [facility] meds: Haldol, Clozaril, Risperdal, Invega, Zyprexa, Seroquel, Abilify    PPS/CM:  Shelby Chowdary: [phone]  [email or web address]    Contacts:  Maria C Aaron (Daughter): [phone]   Clifford Aaron (ex-wife): [phone]     No Del Angel (guardianship/conservatorship ): [phone]  [email or web address]     Upcoming Meetings and Dates/Important Information  and next steps:  Follow up with family regarding list of Memory Care facilities  Discharge planning when appropriate  Pt does not qualify for MA per financial counselor on 11/8/2024. Pt will likely privately pay for Memory Care until he qualifies for MA/waivers in the future.  Pt will need to apply for MA before a M[employer] Assessment/SMRT can be completed for waivers.    Provisional Discharge and Change of Status needed at discharge

## 2024-11-29 NOTE — PLAN OF CARE
BEH IP Unit Acuity Rating Score (UARS)  Patient is given one point for every criteria they meet.    CRITERIA SCORING   On a 72 hour hold, court hold, committed, stay of commitment, or revocation. 1    Patient LOS on BEH unit exceeds 20 days. 1  LOS: 48   Patient under guardianship, 55+, otherwise medically complex, or under age 11. 1   Suicide ideation without relief of precipitating factors. 0   Current plan for suicide. 0   Current plan for homicide. 0   Imminent risk or actual attempt to seriously harm another without relief of factors precipitating the attempt. 1   Severe dysfunction in daily living (ex: complete neglect for self care, extreme disruption in vegetative function, extreme deterioration in social interactions). 1   Recent (last 7 days) or current physical aggression in the ED or on unit. 0   Restraints or seclusion episode in past 72 hours. 0   Recent (last 7 days) or current verbal aggression, agitation, yelling, etc., while in the ED or unit. 0   Active psychosis. 1   Need for constant or near constant redirection (from leaving, from others, etc).  0   Intrusive or disruptive behaviors. 1   Patient requires 3 or more hours of individualized nursing care per 8-hour shift (i.e. for ADLs, meds, therapeutic interventions). 1   TOTAL 8

## 2024-11-29 NOTE — PLAN OF CARE
The pt was visible in the milieu for most of the shift watching TV, with minimally interaction with staff and peers. The pt was intermittently observed napping in the lounge area. The pt presented similarly to the last encounter with the writer, showing a calm yet suspicious mood, flat into labile affect, and occasionally confusion and tangential in speech. The pt was inappropriately focused on discharge but receptive to verbal redirection by staff. The pt remained on SIO 1:1 due to severe intrusiveness. Appetite was good and fluid intake was adequate. No behavioral escalations were observed or reported during the shift. The pt was prompted to take a shower, to which the pt was receptive and appeared well groomed. The pt complied with med after multiple prompts from staff.   BP (!) 140/76 (BP Location: Right arm, Patient Position: Sitting, Cuff Size: Adult Large)   Pulse 63   Temp 97.6  F (36.4  C) (Oral)   Resp 16   Wt 108.5 kg (239 lb 3.2 oz)   SpO2 96%   BMI 38.61 kg/m      Problem: Adult Behavioral Health Plan of Care  Goal: Plan of Care Review  Outcome: Progressing  Flowsheets (Taken 11/29/2024 1630)  Plan of Care Reviewed With: patient  Overall Patient Progress: improving  Patient Agreement with Plan of Care: agrees     Problem: Psychotic Signs/Symptoms  Goal: Improved Behavioral Control (Psychotic Signs/Symptoms)  Outcome: Progressing  Intervention: Manage Behavior  Recent Flowsheet Documentation  Taken 11/29/2024 1630 by Jarrod Keenan RN  De-Escalation Techniques: verbally redirected  Goal: Improved Mood Symptoms (Psychotic Signs/Symptoms)  Outcome: Progressing  Intervention: Optimize Emotion and Mood  Recent Flowsheet Documentation  Taken 11/29/2024 1630 by Jarrod Keenan RN  Supportive Measures:   active listening utilized                           positive reinforcement provided   relaxation techniques promoted             self-care encouraged   self-reflection promoted                          verbalization of feelings encouraged  Diversional Activity: television   Goal Outcome Evaluation:    Plan of Care Reviewed With: patient Plan of Care Reviewed With: patient    Overall Patient Progress: improvingOverall Patient Progress: improving

## 2024-11-30 PROCEDURE — 250N000013 HC RX MED GY IP 250 OP 250 PS 637

## 2024-11-30 PROCEDURE — 124N000002 HC R&B MH UMMC

## 2024-11-30 RX ADMIN — METOPROLOL SUCCINATE 50 MG: 50 TABLET, EXTENDED RELEASE ORAL at 08:54

## 2024-11-30 RX ADMIN — LISINOPRIL 40 MG: 10 TABLET ORAL at 08:54

## 2024-11-30 RX ADMIN — ACETAMINOPHEN 650 MG: 325 TABLET, FILM COATED ORAL at 11:33

## 2024-11-30 RX ADMIN — FUROSEMIDE 40 MG: 40 TABLET ORAL at 08:54

## 2024-11-30 RX ADMIN — Medication 3 MG: at 21:35

## 2024-11-30 RX ADMIN — CINACALCET 30 MG: 30 TABLET ORAL at 08:53

## 2024-11-30 RX ADMIN — Medication 1 PATCH: at 08:58

## 2024-11-30 RX ADMIN — MEMANTINE 5 MG: 5 TABLET ORAL at 08:54

## 2024-11-30 RX ADMIN — OLANZAPINE 5 MG: 5 TABLET, ORALLY DISINTEGRATING ORAL at 21:34

## 2024-11-30 RX ADMIN — AMLODIPINE BESYLATE 10 MG: 5 TABLET ORAL at 08:53

## 2024-11-30 RX ADMIN — CLONIDINE HYDROCHLORIDE 0.1 MG: 0.1 TABLET ORAL at 08:54

## 2024-11-30 RX ADMIN — ATORVASTATIN CALCIUM 40 MG: 20 TABLET, FILM COATED ORAL at 08:54

## 2024-11-30 RX ADMIN — CLONIDINE HYDROCHLORIDE 0.1 MG: 0.1 TABLET ORAL at 21:35

## 2024-11-30 ASSESSMENT — ACTIVITIES OF DAILY LIVING (ADL)
ADLS_ACUITY_SCORE: 74
ADLS_ACUITY_SCORE: 85
HYGIENE/GROOMING: INDEPENDENT;PROMPTS
ADLS_ACUITY_SCORE: 74
DRESS: INDEPENDENT;PROMPTS
LAUNDRY: WITH SUPERVISION
DRESS: INDEPENDENT;PROMPTS
HYGIENE/GROOMING: INDEPENDENT;PROMPTS
ADLS_ACUITY_SCORE: 74
ORAL_HYGIENE: INDEPENDENT;PROMPTS
ADLS_ACUITY_SCORE: 74
ADLS_ACUITY_SCORE: 85
ADLS_ACUITY_SCORE: 85
ADLS_ACUITY_SCORE: 74
ADLS_ACUITY_SCORE: 74
ADLS_ACUITY_SCORE: 85
ORAL_HYGIENE: INDEPENDENT;PROMPTS
LAUNDRY: WITH SUPERVISION
ADLS_ACUITY_SCORE: 74
ADLS_ACUITY_SCORE: 85

## 2024-11-30 NOTE — PLAN OF CARE
The [facility], watching [facility] and socializing with selected peers. The pt's presentation was similar to previous evening shift, appearing calm to suspicious mood. The pt exhibited flat to labile affect. As the evening progressed, the pt occasionally appeared confused and made delusional statements, claiming that staff had drugged him. He was suspicious when approached during HS med and declined to take med from the writer; another RN gave med with prompting. No behavioral escalations or safety concerns were observed or reported. Appetite was good and fluid intake was adequate.     Problem: Psychotic Signs/Symptoms  Goal: Improved Behavioral Control (Psychotic Signs/Symptoms)  Outcome: Progressing  Intervention: Manage Behavior  Recent Flowsheet Documentation  Taken 11/30/2024 1630 by Jarrod Keenan RN  De-Escalation Techniques: verbally redirected  Goal: Improved Mood Symptoms (Psychotic Signs/Symptoms)  Outcome: Progressing  Intervention: Optimize Emotion and Mood  Recent Flowsheet Documentation  Taken 11/30/2024 1630 by Jarrod Keenan RN  Supportive Measures:   active listening utilized                                          positive reinforcement provided   relaxation techniques promoted                            self-care encouraged   self-reflection promoted                                        verbalization of feelings encouraged  Diversional Activity: television   Goal Outcome Evaluation:    Plan of Care Reviewed With: patient
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
## 2024-11-30 NOTE — PLAN OF CARE
"Initially pt was awake at beginning of shift. After breakfast pt observed nodding off and on in the lounge. Pt eventually went to his room and napped. Pt reported headache and rec'd PRN Tylenol at 1130.  Pt encouraged to drink water.  Pt reported his mood is \"pretty good.\"  Pt asked if having hallucinations. Initially pt stated no buy then he had been.  Asked what hallucinations were pt seemed unable to say.  Pt went on to talk about a nurse and them giving him his meds.  Pt was describing his colors and shapes of his pills then pointed and said just like the pill on your pants.  Was unable to follow his story.  Explained to pt don't have any pills on my pills.  PT ate meals.  Medication compliant.    Problem: Adult Behavioral Health Plan of Care  Goal: Plan of Care Review  Outcome: Not Progressing  Goal: Patient-Specific Goal (Individualization)  Description: You can add care plan individualizations to a care plan. Examples of Individualization might be:  \"Parent requests to be called daily at 9am for status\", \"I have a hard time hearing out of my right ear\", or \"Do not touch me to wake me up as it startles  me\".  Outcome: Not Progressing  Goal: Adheres to Safety Considerations for Self and Others  Outcome: Not Progressing  Intervention: Develop and Maintain Individualized Safety Plan  Recent Flowsheet Documentation  Taken 11/30/2024 0900 by Catherine Szymanski RN  Safety Measures: safety rounds completed  Goal: Absence of New-Onset Illness or Injury  Outcome: Not Progressing  Intervention: Identify and Manage Fall Risk  Recent Flowsheet Documentation  Taken 11/30/2024 0900 by Catherine Szymanski RN  Safety Measures: safety rounds completed  Goal: Optimized Coping Skills in Response to Life Stressors  Outcome: Not Progressing  Goal: Develops/Participates in Therapeutic Shafer to Support Successful Transition  Outcome: Not Progressing     Problem: Psychotic Signs/Symptoms  Goal: Improved Behavioral Control (Psychotic " Signs/Symptoms)  Outcome: Not Progressing  Goal: Optimal Cognitive Function (Psychotic Signs/Symptoms)  Outcome: Not Progressing  Goal: Increased Participation and Engagement (Psychotic Signs/Symptoms)  Outcome: Not Progressing  Goal: Improved Mood Symptoms (Psychotic Signs/Symptoms)  Outcome: Not Progressing  Goal: Improved Psychomotor Symptoms (Psychotic Signs/Symptoms)  Outcome: Not Progressing  Intervention: Manage Psychomotor Movement  Recent Flowsheet Documentation  Taken 11/30/2024 0900 by Catherine Szymanski RN  Activity (Behavioral Health):   activity encouraged   up ad diya  Goal: Decreased Sensory Symptoms (Psychotic Signs/Symptoms)  Outcome: Not Progressing  Goal: Improved Sleep (Psychotic Signs/Symptoms)  Outcome: Not Progressing  Goal: Enhanced Social, Occupational or Functional Skills (Psychotic Signs/Symptoms)  Outcome: Not Progressing     Problem: Depression  Goal: Improved Mood  Outcome: Not Progressing     Problem: Suicidal Behavior  Goal: Suicidal Behavior is Absent or Managed  Outcome: Not Progressing     Problem: Anxiety  Goal: Anxiety Reduction or Resolution  Outcome: Not Progressing     Problem: Sleep Disturbance  Goal: Adequate Sleep/Rest  Outcome: Not Progressing     Problem: Seclusion/Restraint, Behavioral  Goal: Seclusion/Behavioral Restraint Goal: Absence of Harm or Injury  Outcome: Not Progressing  Intervention: Implement Least Restrictive Safety Strategies  Recent Flowsheet Documentation  Taken 11/30/2024 0900 by Catherine Szymanski RN  Safety Measures: safety rounds completed     Problem: Pain Acute  Goal: Optimal Pain Control and Function  Outcome: Not Progressing  Intervention: Develop Pain Management Plan  Recent Flowsheet Documentation  Taken 11/30/2024 1133 by Catherine Szymanski RN  Pain Management Interventions: medication (see MAR)   Goal Outcome Evaluation:

## 2024-12-01 PROCEDURE — 124N000002 HC R&B MH UMMC

## 2024-12-01 PROCEDURE — 250N000013 HC RX MED GY IP 250 OP 250 PS 637

## 2024-12-01 RX ADMIN — Medication 3 MG: at 22:35

## 2024-12-01 RX ADMIN — OLANZAPINE 5 MG: 5 TABLET, ORALLY DISINTEGRATING ORAL at 22:35

## 2024-12-01 RX ADMIN — METOPROLOL SUCCINATE 50 MG: 50 TABLET, EXTENDED RELEASE ORAL at 09:39

## 2024-12-01 RX ADMIN — AMLODIPINE BESYLATE 10 MG: 5 TABLET ORAL at 09:39

## 2024-12-01 RX ADMIN — LISINOPRIL 40 MG: 10 TABLET ORAL at 09:39

## 2024-12-01 RX ADMIN — FUROSEMIDE 40 MG: 40 TABLET ORAL at 09:40

## 2024-12-01 RX ADMIN — ATORVASTATIN CALCIUM 40 MG: 20 TABLET, FILM COATED ORAL at 09:39

## 2024-12-01 RX ADMIN — CLONIDINE HYDROCHLORIDE 0.1 MG: 0.1 TABLET ORAL at 09:40

## 2024-12-01 RX ADMIN — CINACALCET 30 MG: 30 TABLET ORAL at 09:39

## 2024-12-01 RX ADMIN — MEMANTINE 5 MG: 5 TABLET ORAL at 09:40

## 2024-12-01 RX ADMIN — CLONIDINE HYDROCHLORIDE 0.1 MG: 0.1 TABLET ORAL at 22:35

## 2024-12-01 RX ADMIN — Medication 1 PATCH: at 09:44

## 2024-12-01 ASSESSMENT — ACTIVITIES OF DAILY LIVING (ADL)
ADLS_ACUITY_SCORE: 85
ADLS_ACUITY_SCORE: 64
ADLS_ACUITY_SCORE: 85
ADLS_ACUITY_SCORE: 85
HYGIENE/GROOMING: INDEPENDENT;PROMPTS
ADLS_ACUITY_SCORE: 64
ADLS_ACUITY_SCORE: 85
ADLS_ACUITY_SCORE: 85
HYGIENE/GROOMING: INDEPENDENT;PROMPTS
ADLS_ACUITY_SCORE: 85
ADLS_ACUITY_SCORE: 85
ADLS_ACUITY_SCORE: 64
ORAL_HYGIENE: INDEPENDENT;PROMPTS
ADLS_ACUITY_SCORE: 85
ORAL_HYGIENE: INDEPENDENT;PROMPTS
ADLS_ACUITY_SCORE: 85
ADLS_ACUITY_SCORE: 85
ADLS_ACUITY_SCORE: 64
DRESS: INDEPENDENT;STREET CLOTHES
ADLS_ACUITY_SCORE: 64
ADLS_ACUITY_SCORE: 85
ADLS_ACUITY_SCORE: 85
LAUNDRY: UNABLE TO COMPLETE
ADLS_ACUITY_SCORE: 85
LAUNDRY: UNABLE TO COMPLETE
ADLS_ACUITY_SCORE: 85

## 2024-12-01 NOTE — PLAN OF CARE
"Pt denies SI, pt compliant with taking his scheduled medications.  Pt observed nodding off in the lounge.  Pt seemed less tense today. Pt has not talked about getting his phone or his truck.  Pt does has edema noted in both extremities.  Pt declining to wear PAM hose.  Pt eating meals.  Pt currently in lounge watching [employer] game.   Problem: Adult Behavioral Health Plan of Care  Goal: Plan of Care Review  12/1/2024 1421 by Catherine Szymanski RN  Outcome: Not Progressing  12/1/2024 0758 by Catherine Szymanski RN  Outcome: Not Progressing  Goal: Patient-Specific Goal (Individualization)  Description: You can add care plan individualizations to a care plan. Examples of Individualization might be:  \"Parent requests to be called daily at 9am for status\", \"I have a hard time hearing out of my right ear\", or \"Do not touch me to wake me up as it startles  me\".  12/1/2024 1421 by Catherine Szymanski RN  Outcome: Not Progressing  12/1/2024 0758 by Catherine Szymanski RN  Outcome: Not Progressing  Goal: Adheres to Safety Considerations for Self and Others  12/1/2024 1421 by Catherine Szymanski RN  Outcome: Not Progressing  12/1/2024 0758 by Catherine Szymanski RN  Outcome: Not Progressing  Intervention: Develop and Maintain Individualized Safety Plan  Recent Flowsheet Documentation  Taken 12/1/2024 1000 by Catherine Szymanski RN  Safety Measures:   environmental rounds completed   safety rounds completed  Goal: Absence of New-Onset Illness or Injury  12/1/2024 1421 by Catherine Szymanski RN  Outcome: Not Progressing  12/1/2024 0758 by Catherine Szymanski RN  Outcome: Not Progressing  Intervention: Identify and Manage Fall Risk  Recent Flowsheet Documentation  Taken 12/1/2024 1000 by Catherine Szymanski RN  Safety Measures:   environmental rounds completed   safety rounds completed  Goal: Optimized Coping Skills in Response to Life Stressors  12/1/2024 1421 by Catherine Szymanski RN  Outcome: Not Progressing  12/1/2024 0758 by Catherine Szymanski RN  Outcome: Not Progressing  Goal: " Develops/Participates in Therapeutic [place] Run to Support Successful Transition  12/1/2024 1421 by Catherine Szymanski RN  Outcome: Not Progressing  12/1/2024 0758 by Catherine Szymanski RN  Outcome: Not Progressing     Problem: Psychotic Signs/Symptoms  Goal: Improved Behavioral Control (Psychotic Signs/Symptoms)  12/1/2024 1421 by Catherine Szymanski RN  Outcome: Not Progressing  12/1/2024 0758 by Catherine Szymanski RN  Outcome: Not Progressing  Goal: Optimal Cognitive Function (Psychotic Signs/Symptoms)  12/1/2024 1421 by Catherine Szymanski RN  Outcome: Not Progressing  12/1/2024 0758 by Catherine Szymanski RN  Outcome: Not Progressing  Goal: Increased Participation and Engagement (Psychotic Signs/Symptoms)  12/1/2024 1421 by Catherine Szymanski RN  Outcome: Not Progressing  12/1/2024 0758 by Catherine Szymanski RN  Outcome: Not Progressing  Goal: Improved Mood Symptoms (Psychotic Signs/Symptoms)  12/1/2024 1421 by Catherine Szymanski RN  Outcome: Not Progressing  12/1/2024 0758 by Catherine Szymanski RN  Outcome: Not Progressing  Goal: Improved Psychomotor Symptoms (Psychotic Signs/Symptoms)  12/1/2024 1421 by Catherine Szymanski RN  Outcome: Not Progressing  12/1/2024 0758 by Catherine Szymanski RN  Outcome: Not Progressing  Intervention: Manage Psychomotor Movement  Recent Flowsheet Documentation  Taken 12/1/2024 1000 by Catherine Szymanski RN  Activity (Behavioral Health):   activity encouraged   up ad diya  Goal: Decreased Sensory Symptoms (Psychotic Signs/Symptoms)  12/1/2024 1421 by Catherine Szymanski RN  Outcome: Not Progressing  12/1/2024 0758 by Catherine Szymanski RN  Outcome: Not Progressing  Goal: Improved Sleep (Psychotic Signs/Symptoms)  12/1/2024 1421 by Catherine Szymanski RN  Outcome: Not Progressing  12/1/2024 0758 by Catherine Szymanski RN  Outcome: Not Progressing  Goal: Enhanced Social, Occupational or Functional Skills (Psychotic Signs/Symptoms)  12/1/2024 1421 by Catherine Szymanski RN  Outcome: Not Progressing  12/1/2024 0758 by Catherine Szymanski RN  Outcome: Not Progressing      Problem: Depression  Goal: Improved Mood  12/1/2024 1421 by Catherine Szymanski RN  Outcome: Not Progressing  12/1/2024 0758 by Catherine Szymanski RN  Outcome: Not Progressing     Problem: Suicidal Behavior  Goal: Suicidal Behavior is Absent or Managed  12/1/2024 1421 by Catherine Szymanski RN  Outcome: Not Progressing  12/1/2024 0758 by Catherine Szymanski RN  Outcome: Not Progressing     Problem: Anxiety  Goal: Anxiety Reduction or Resolution  12/1/2024 1421 by Catherine Szymanski RN  Outcome: Not Progressing  12/1/2024 0758 by Catherine Szymanski RN  Outcome: Not Progressing     Problem: Sleep Disturbance  Goal: Adequate Sleep/Rest  12/1/2024 1421 by Catherine Szymanski RN  Outcome: Not Progressing  12/1/2024 0758 by Catherine Szymanski RN  Outcome: Not Progressing     Problem: Seclusion/Restraint, Behavioral  Goal: Seclusion/Behavioral Restraint Goal: Absence of Harm or Injury  12/1/2024 1421 by Catherine Szymanski RN  Outcome: Not Progressing  12/1/2024 0758 by Catherine Szymanski RN  Outcome: Not Progressing  Intervention: Implement Least Restrictive Safety Strategies  Recent Flowsheet Documentation  Taken 12/1/2024 1000 by Catherine Szymanski RN  Safety Measures:   environmental rounds completed   safety rounds completed     Problem: Pain Acute  Goal: Optimal Pain Control and Function  12/1/2024 1421 by Catherine Szymanski RN  Outcome: Not Progressing  12/1/2024 0758 by Catherine Szymanski RN  Outcome: Not Progressing   Goal Outcome Evaluation:
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
## 2024-12-02 PROCEDURE — 250N000013 HC RX MED GY IP 250 OP 250 PS 637

## 2024-12-02 PROCEDURE — 99232 SBSQ HOSP IP/OBS MODERATE 35: CPT | Mod: GC | Performed by: PSYCHIATRY & NEUROLOGY

## 2024-12-02 PROCEDURE — 124N000002 HC R&B MH UMMC

## 2024-12-02 RX ORDER — OLANZAPINE 5 MG/1
5 TABLET, ORALLY DISINTEGRATING ORAL AT BEDTIME
Status: COMPLETED | OUTPATIENT
Start: 2024-12-03 | End: 2024-12-03

## 2024-12-02 RX ORDER — OLANZAPINE 10 MG/2ML
10 INJECTION, POWDER, FOR SOLUTION INTRAMUSCULAR AT BEDTIME
Status: DISCONTINUED | OUTPATIENT
Start: 2024-12-03 | End: 2024-12-02

## 2024-12-02 RX ORDER — OLANZAPINE 5 MG/1
5 TABLET, ORALLY DISINTEGRATING ORAL AT BEDTIME
Status: DISCONTINUED | OUTPATIENT
Start: 2024-12-03 | End: 2024-12-02

## 2024-12-02 RX ORDER — OLANZAPINE 10 MG/2ML
10 INJECTION, POWDER, FOR SOLUTION INTRAMUSCULAR AT BEDTIME
Status: COMPLETED | OUTPATIENT
Start: 2024-12-03 | End: 2024-12-03

## 2024-12-02 RX ADMIN — METOPROLOL SUCCINATE 50 MG: 50 TABLET, EXTENDED RELEASE ORAL at 08:13

## 2024-12-02 RX ADMIN — Medication 1 PATCH: at 08:21

## 2024-12-02 RX ADMIN — CLONIDINE HYDROCHLORIDE 0.1 MG: 0.1 TABLET ORAL at 08:12

## 2024-12-02 RX ADMIN — CINACALCET 30 MG: 30 TABLET ORAL at 08:12

## 2024-12-02 RX ADMIN — AMLODIPINE BESYLATE 10 MG: 5 TABLET ORAL at 08:12

## 2024-12-02 RX ADMIN — ATORVASTATIN CALCIUM 40 MG: 20 TABLET, FILM COATED ORAL at 08:12

## 2024-12-02 RX ADMIN — FUROSEMIDE 40 MG: 40 TABLET ORAL at 08:12

## 2024-12-02 RX ADMIN — MEMANTINE 5 MG: 5 TABLET ORAL at 08:12

## 2024-12-02 RX ADMIN — LISINOPRIL 40 MG: 10 TABLET ORAL at 08:12

## 2024-12-02 ASSESSMENT — ACTIVITIES OF DAILY LIVING (ADL)
ADLS_ACUITY_SCORE: 64
ADLS_ACUITY_SCORE: 64
DRESS: STREET CLOTHES;INDEPENDENT
DRESS: STREET CLOTHES
ADLS_ACUITY_SCORE: 64
HYGIENE/GROOMING: PROMPTS;INDEPENDENT
ADLS_ACUITY_SCORE: 64
ORAL_HYGIENE: PROMPTS;INDEPENDENT
ADLS_ACUITY_SCORE: 64
ORAL_HYGIENE: INDEPENDENT
HYGIENE/GROOMING: INDEPENDENT;PROMPTS
ADLS_ACUITY_SCORE: 64
LAUNDRY: UNABLE TO COMPLETE
ADLS_ACUITY_SCORE: 64
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
## 2024-12-02 NOTE — PLAN OF CARE
Problem: Sleep Disturbance  Goal: Adequate Sleep/Rest  Outcome: Progressing    Pt appears to have slept for  5.5 hours.  He woke up intermittently and sat in the lounge with his SIO staff by his side.  Pt denies pain, anxiety, depression, and SI/SIB. No PRN medications were given. Pt is on 1:1 SIO for assault risk and severe intrusiveness. 15 minutes safety checks were in place. Staff will continue to offer support to pt.

## 2024-12-02 NOTE — PLAN OF CARE
The pt presented similarly to previous encounters, displaying a mood ranging calm to suspicious in mood. The pt affect was flat to labile, and occasionally confused. The pt made fewer delusional statements but maintained tangential speech, primarily focused on his desire for discharge. Staff were able to verbally redirect the pt and he was receptive. The pt denied all MH concerns and contracted for safety. No behavioral escalations or safety concerns were observed or reported. Appetite was good and fluid intake was adequate. The pt remained suspicious when approached during HS med and declined to take med from the writer; another RN gave med with multiple prompting.     Problem: Psychotic Signs/Symptoms  Goal: Improved Behavioral Control (Psychotic Signs/Symptoms)  Outcome: Progressing  Intervention: Manage Behavior  Recent Flowsheet Documentation  Taken 12/1/2024 1630 by Jarrod Keenan RN  De-Escalation Techniques: verbally redirected  Goal: Improved Mood Symptoms (Psychotic Signs/Symptoms)  Outcome: Progressing  Intervention: Optimize Emotion and Mood  Recent Flowsheet Documentation  Taken 12/1/2024 1630 by Jarrod Keenan RN  Supportive Measures:   active listening utilized                           positive reinforcement provided   relaxation techniques promoted             self-care encouraged   self-reflection promoted                          verbalization of feelings encouraged  Diversional Activity: television   Goal Outcome Evaluation:    Plan of Care Reviewed With: patient

## 2024-12-02 NOTE — PLAN OF CARE
BEH IP Unit Acuity Rating Score (UARS)  Patient is given one point for every criteria they meet.    CRITERIA SCORING   On a 72 hour hold, court hold, committed, stay of commitment, or revocation. 1    Patient LOS on BEH unit exceeds 20 days. 1  LOS: 51   Patient under guardianship, 55+, otherwise medically complex, or under age 11. 1   Suicide ideation without relief of precipitating factors. 0   Current plan for suicide. 0   Current plan for homicide. 0   Imminent risk or actual attempt to seriously harm another without relief of factors precipitating the attempt. 1   Severe dysfunction in daily living (ex: complete neglect for self care, extreme disruption in vegetative function, extreme deterioration in social interactions). 1   Recent (last 7 days) or current physical aggression in the ED or on unit. 0   Restraints or seclusion episode in past 72 hours. 0   Recent (last 7 days) or current verbal aggression, agitation, yelling, etc., while in the ED or unit. 0   Active psychosis. 1   Need for constant or near constant redirection (from leaving, from others, etc).  0   Intrusive or disruptive behaviors. 1   Patient requires 3 or more hours of individualized nursing care per 8-hour shift (i.e. for ADLs, meds, therapeutic interventions). 1   TOTAL 8

## 2024-12-02 NOTE — PLAN OF CARE
Team Note Due:  Monday    Assessment/Intervention/Current Symtoms and Care Coordination:  Chart review and met with team, discussed pt progress, symptomology, and response to treatment.  Discussed the discharge plan and any potential impediments to discharge. Clifford Aaron is currently emergency guardian/conservator until 01/20/2025.    Received update from Clifford that they would like to move forward with having records sent to [facility] (fax [phone]). FILIBERTO sent to Clifford to sign. Copy of signed FILIBERTO placed in pt's paper chart.    3 weeks of psych notes, MAR, and recent medicine notes sent via fax to [facility].    Discharge Plan or Goal:  Memory care facility     Barriers to Discharge:  Patient requires further psychiatric stabilization due to current symptomology, medication management with changes subject to provider, coordination with outside supports, and aftercare planning. Pt is under civil commitment.     Referral Status:    Memory Care facilities currently in process:  [facility]. Tour completed 11/27.  [facility]. Tour completed 11/29. Records sent 12/2/24.  [facility]. Tour completed 11/29.  [facility]. Tour completed 11/30.    Memory Care facilities pending family review:  [facility] [place].   [facility].  [facility].  [facility].  [facility].  [facility].  [facility].    Memory Care facilities declined:  [facility] (private pay only, no EW).  [facility] (private pay only, no EW).  [facility] (no openings).     Legal Status:  MI Commitment with [facility]  File Number: [id number]  Start and expiration date of commitment: 10/24/24 - 04/24/25    [facility] meds: Haldol, Clozaril, Risperdal, Invega, Zyprexa, Seroquel,  Abilify    PPS/CM:  Shelby Chowdary: [phone]  [email or web address].    Contacts:  Maria C Aaron (Daughter): [phone]   Clifford Aaron (ex-wife): [phone]     No Bean (guardianship/conservatorship ): [phone]  [email or web address]     Upcoming Meetings and Dates/Important Information and next steps:  Follow up with family regarding list of Memory Care facilities  Discharge planning when appropriate  Pt does not qualify for MA per financial counselor on 11/8/2024. Pt will likely privately pay for Memory Care until he qualifies for MA/waivers in the future.  Pt will need to apply for MA before a [employer] Assessment/SMRT can be completed for waivers.    Provisional Discharge and Change of Status needed at discharge
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
## 2024-12-02 NOTE — PROGRESS NOTES
"  ----------------------------------------------------------------------------------------------------------  [facility]  Psychiatry Progress Note  Hospital Day #51     Interim History:     The patient's care was discussed with the treatment team and chart notes were reviewed.    Vitals: VSS  Sleep: 5.5 hours (12/02/24 0600)  Scheduled medications: Took all scheduled medications as prescribed  Psychiatric PRN medications:   None  Staff Report:   -The pt presented with mood ranging calm to suspicious in mood.   -The pt affect was flat to labile, and occasionally confused.   -The pt made fewer delusional statements but maintained tangential speech, primarily focused on his desire for discharge.   -The pt denied all MH concerns and contracted for safety.   -Appetite was good and fluid intake was adequate.      - Please see staff notes for details.      Subjective:     Patient Interview:  Santos reports he is doing well. And had a good holiday with family visiting. He reports concern regarding itchy feeling on the left ankle. He expresses interest in getting new comprehension socks. He reports mild dry mouth.     Santos, notes that his wife says he can't use guns anymore and wishes she would go away with her boyfriend. He reports missing hunting deer and his \"44\". He proceeds to request a \"44\" from the nurses, but was easily redirectable. He requested and received a candy for his dry mouth.  ROS:  See above     Objective:     Vitals:  /78 (BP Location: Right arm, Patient Position: Sitting, Cuff Size: Adult Large)   Pulse 61   Temp 97.7  F (36.5  C) (Oral)   Resp 16   Wt 108.6 kg (239 lb 8 oz)   SpO2 98%   BMI 38.66 kg/m      Allergies:  No Known Allergies    Current Medications:  Scheduled:  Current Facility-Administered Medications   Medication Dose Route Frequency Provider Last Rate Last Admin    acetaminophen (TYLENOL) tablet 650 mg  650 mg Oral Q4H PRN Nicki, " MD Nikki   650 mg at 11/30/24 1133    alum & mag hydroxide-simethicone (MAALOX) suspension 30 mL  30 mL Oral Q4H PRN Nikki Caceres MD   30 mL at 10/17/24 0837    amLODIPine (NORVASC) tablet 10 mg  10 mg Oral Daily Presley Barrera MD   10 mg at 12/01/24 0939    atorvastatin (LIPITOR) tablet 40 mg  40 mg Oral Daily Nikki Caceres MD   40 mg at 12/01/24 0939    cholecalciferol (VITAMIN D3) capsule 1,250 mcg  1,250 mcg Oral Q7 Days Evelia Grimm DO   1,250 mcg at 11/26/24 1526    cinacalcet (SENSIPAR) tablet 30 mg  30 mg Oral Daily Presley Barrera MD   30 mg at 12/01/24 0939    cloNIDine (CATAPRES) tablet 0.1 mg  0.1 mg Oral BID Presley Barrera MD   0.1 mg at 12/01/24 2235    furosemide (LASIX) tablet 40 mg  40 mg Oral Daily Presley Barrera MD   40 mg at 12/01/24 0940    gabapentin (NEURONTIN) capsule 100 mg  100 mg Oral Q6H PRN Nikki Caceres MD   100 mg at 11/15/24 0418    hydrocortisone (CORTAID) 0.5 % cream   Topical Daily PRN Savi Chamorro MD        lisinopril (ZESTRIL) tablet 40 mg  40 mg Oral Daily Presley Barrera MD   40 mg at 12/01/24 0939    melatonin tablet 3 mg  3 mg Oral At Bedtime Presley Barrera MD   3 mg at 12/01/24 2235    memantine (NAMENDA) tablet 5 mg  5 mg Oral Daily Savi Chamorro MD   5 mg at 12/01/24 0940    metoprolol succinate ER (TOPROL XL) 24 hr tablet 50 mg  50 mg Oral Daily Presley Barrera MD   50 mg at 12/01/24 0939    nicotine (NICODERM CQ) 14 MG/24HR 24 hr patch 1 patch  1 patch Transdermal Daily Evelia Grimm DO   1 patch at 12/01/24 0944    nicotine (NICODERM CQ) 21 MG/24HR 24 hr patch 1 patch  1 patch Transdermal Daily PRN Britt Kang MD   1 patch at 10/13/24 1611    nicotine (NICORETTE) gum 2 mg  2 mg Buccal Q1H PRN González Garcia MD   2 mg at 10/24/24 1254    OLANZapine (zyPREXA) tablet 5 mg  5 mg Oral TID PRN Evelia Grimm DO   5 mg at 11/24/24 0436    Or    OLANZapine (zyPREXA) injection 5 mg  5 mg Intramuscular TID PRN Evelia Grimm DO         OLANZapine zydis (zyPREXA) ODT tab 5 mg  5 mg Oral At Bedtime González Garcia MD   5 mg at 12/01/24 2235    polyethylene glycol (MIRALAX) Packet 17 g  17 g Oral Daily PRN Nikki Caceres MD        traZODone (DESYREL) half-tab 25 mg  25 mg Oral At Bedtime PRN Evelia Grimm DO   25 mg at 11/29/24 0148    Or    traZODone (DESYREL) tablet 50 mg  50 mg Oral At Bedtime PRN Evelia Grimm DO           PRN:  Current Facility-Administered Medications   Medication Dose Route Frequency Provider Last Rate Last Admin    acetaminophen (TYLENOL) tablet 650 mg  650 mg Oral Q4H PRN Nikki Caceres MD   650 mg at 11/30/24 1133    alum & mag hydroxide-simethicone (MAALOX) suspension 30 mL  30 mL Oral Q4H PRN Nikki Caceres MD   30 mL at 10/17/24 0837    amLODIPine (NORVASC) tablet 10 mg  10 mg Oral Daily Presley Barrera MD   10 mg at 12/01/24 0939    atorvastatin (LIPITOR) tablet 40 mg  40 mg Oral Daily Nikki Caceres MD   40 mg at 12/01/24 0939    cholecalciferol (VITAMIN D3) capsule 1,250 mcg  1,250 mcg Oral Q7 Days Evelia Grimm DO   1,250 mcg at 11/26/24 1526    cinacalcet (SENSIPAR) tablet 30 mg  30 mg Oral Daily Presley Barrera MD   30 mg at 12/01/24 0939    cloNIDine (CATAPRES) tablet 0.1 mg  0.1 mg Oral BID Presley Barrera MD   0.1 mg at 12/01/24 2235    furosemide (LASIX) tablet 40 mg  40 mg Oral Daily Presley Barrera MD   40 mg at 12/01/24 0940    gabapentin (NEURONTIN) capsule 100 mg  100 mg Oral Q6H PRN Nikki Caceres MD   100 mg at 11/15/24 0418    hydrocortisone (CORTAID) 0.5 % cream   Topical Daily PRN Savi Chamorro MD        lisinopril (ZESTRIL) tablet 40 mg  40 mg Oral Daily Presley Barrera MD   40 mg at 12/01/24 0939    melatonin tablet 3 mg  3 mg Oral At Bedtime Presley Barrera MD   3 mg at 12/01/24 2235    memantine (NAMENDA) tablet 5 mg  5 mg Oral Daily Savi Chamorro MD   5 mg at 12/01/24 0940    metoprolol succinate ER (TOPROL XL) 24 hr tablet 50 mg  50 mg Oral Daily Bruce  MD Presley   50 mg at 12/01/24 0939    nicotine (NICODERM CQ) 14 MG/24HR 24 hr patch 1 patch  1 patch Transdermal Daily Evelia Grimm DO   1 patch at 12/01/24 0944    nicotine (NICODERM CQ) 21 MG/24HR 24 hr patch 1 patch  1 patch Transdermal Daily PRN Britt Kang MD   1 patch at 10/13/24 1611    nicotine (NICORETTE) gum 2 mg  2 mg Buccal Q1H PRN González Garcia MD   2 mg at 10/24/24 1254    OLANZapine (zyPREXA) tablet 5 mg  5 mg Oral TID PRN Evelia Grimm DO   5 mg at 11/24/24 0436    Or    OLANZapine (zyPREXA) injection 5 mg  5 mg Intramuscular TID PRN Evelia Grimm DO        OLANZapine zydis (zyPREXA) ODT tab 5 mg  5 mg Oral At Bedtime González Garcia MD   5 mg at 12/01/24 2235    polyethylene glycol (MIRALAX) Packet 17 g  17 g Oral Daily PRN Nikki Caceres MD        traZODone (DESYREL) half-tab 25 mg  25 mg Oral At Bedtime PRN Evelia Grimm DO   25 mg at 11/29/24 0148    Or    traZODone (DESYREL) tablet 50 mg  50 mg Oral At Bedtime PRN Evelia Grimm DO         Labs and Imaging:  Data this admission:  - CBC unremarkable  - CMP unremarkable  - TSH normal  - UDS negative  - Vit D low  - Hgb A1c 5.9 (10/13/24)  - Lipids unremarkable  - Vit B12 normal  - Folate normal  - Urinalysis unremarkable  - EKG normal sinus rhythm, QTc 390 ms  - Head CT showed no acute changes  - HIV non reactive  - Treponema antibody non reactive  - ESR wnl   - Ceruloplasmin wnl   - BOOGIE negative  - Lyme negative      Parathyroid hormone:   10/13: 85     Calcium:  10/14: 11.4  10/16: 10.3  10/17: 10.3  10/19: 9.8  10/21: 10.6  10/23: 10.3     Albumin:  10/14: 4.3  10/16: 4.3  10/17: 4.1  10/19: 4.2  10/21: 4.5  10/23: 4.3      CXR:   Impression:   1. No definite radiographic evidence of asbestosis. If clinically  indicated, consider evaluation with high resolution chest CT.  2. Nonspecific left costophrenic blunting. Given symmetrically low  lung volumes may be related to poor inspiratory effort/timing.  3. Pulmonary vascular  "congestion.     MRI:   IMPRESSION:  1. No acute intracranial pathology.  2. No focal lesion or structural abnormality.   3. Symmetric frontoparietal cortical volume loss slightly more than  expected for age.     Mental Status Exam:   Oriented to: Oriented to self but not time and place  General:  Awake and Alert  Appearance:  appears stated age, Grooming is adequate, and Dressed in his own clothes  Behavior/Attitude:  Calm and Easily distracted  Eye Contact: Appropriate for conversation  Psychomotor: No evidence of tics, dystonia, or tardive dyskinesia  no catatonia present  Speech:  appropriate volume/tone  Language: Fluent in English with appropriate syntax and vocabulary.  Mood:  \"good\"  Affect:  confused  Thought Process:  disorganized and confused  Thought Content:   SI/HI/ delusion of confusion  . Patient denies paranoia  Associations:  loose  Insight:  limited   Judgment:  limited   Impulse control: limited  Attention Span:   decreased  Concentration:   distractible  Recent and Remote Memory:   remote memory intact, recent memory impaired  Fund of Knowledge: average  Muscle Strength and Tone: normal  Gait and Station: Normal     Psychiatric Assessment     Estevan Aaron is a 64 year old male with previous psychiatric diagnoses of GEORGINA admitted from the ER on 10/12/2024 due to concern for HI and psychosis in the context of medical issues (hyperthyroidism, hypercalcemia), psychosocial stressors including with recent divorce and selling his home. This is the patient's first psychiatric hospitalization. Significant symptoms on admission included delusions of persecution with grandiose beliefs, homicidal ideations, as well as disorganized thinking and behavior. The MSE on admission was pertinent for a thought process which was perseverative, circumstantial, tangential, disorganized and tangential; with rambling and looseness of associations. Psychological contributions to presentation included lack of insight. " Social factors contributing to presentation included isolation, recent divorce, selling his house, and moving from hotel to hotel. Biological contributions to presentation included a history of hyperparathyroidism with chronic hypercalcemia per charts as well as a history of methamphetamine use per collateral from ex-wife.     History was difficult to obtain due to the patient's severe disorganized thinking on interview; he was observed with persecutory delusions pertaining to the realtor and gang members, disorganized behavior as these delusions have led him to flee to different hotels to stay safe/ has called  complaining of being targeted and auditory hallucinations of voices for the past 12 months. Per collateral from ex-wife, Santos has had paranoid ideations since they first met. He has always felt like people are out to get him or trying to rip him off. She says that he has had visual hallucinations (he will point things out that the rest of the family can't see) for a long time, but the family would just go along with him to avoid making him angry. This timeline and presentation could be consistent with diagnosis of paranoid personality disorder. Things began to worsen around the beginning of the COVID pandemic, when Santos became more isolated and the family started to notice cognitive issues such as impaired memory. Immediately prior to this hospitalization, the ex-wife found Santos in a hotel room with a generator full of gasoline, binoculars, and hunting equipment. This time course does not suggest acute psychosis, however given the patient has never had a full psychiatric work-up, we completed a first-episode psychosis work-up.      Other things that could be contributing to his presentation is hyperparathyroidism with hypercalcemia. However, patient's calcium returned to normal limits on 10/16, and patient continues to have psychosis so likely not a significant contributing factor to patient's presentation.       Patient also continues to be disoriented and his behaviors appear to worsen in the evenings. This raised concern for underlying neurocognitive disorder with delirium. Patient received MRI brain with and without contrast which showed frontal and parietal atrophy greater than would be expected for patient's age. This is consistent with neurocognitive disorder. Patient has continued to improve with decreasing his antipsychotic. Working to get patient transferred to geriatric psychiatry in the short term and to memory care long term.      Given that he currently has psychosis, patient warrants inpatient psychiatric hospitalization to maintain his safety.     Psychiatric Plan by Diagnosis   Today's changes:  Discontinued memantine 5 mg    # Major Neurocognitive Disorder  # Rule out paranoid personality disorder  1. Medications:  - Olanzapine 5 mg at bedtime  - Neurology consult 11/8/24, recommend outpatient follow-up and neuropsychiatric testing     2. Pertinent Labs/Monitoring:   - See above     3. Additional Plans:  - Patient will be treated in therapeutic milieu with appropriate individual and group therapies as described     # Unspecified anxiety vs Generalized Anxiety Disorder  - Monitor for symptoms.  - Fluoxetine held due to suspicion of ongoing manic symptoms     Psychiatric Hospital Course:      Estevan Aaron was admitted to [facility] on a 72 hour hold.   Medications:  PTA fluoxetine was held due to concern for worsening of zelalem   New medications started at the time of admission include Zyprexa.   Increased olanzapine 10 mg at bedtime was to 10 mg BID (10/14)   Increased olanzapine 10 mg BID to 10 mg during day and 15 mg at bedtime (10/15)  10/21: increased olanzapine pm dose from 15 to 20 mg  10/23: started melatonin 3 mg at 7 pm to help patient with circadian rhythm as he has been staying up throughout the night and sleeping a lot during the day and there is some concern for delirium   10/24: consulted  anesthesia for MRI brain   10/28: MRI brain complete, decrease AM olanzapine from 10 to 5 mg  10/29: Morning olanzapine decreased to 2.5 mg, evening olanzapine decreased to 15 mg   11/1: Decreased evening olanzapine to 10 mg   11/4: Decreased evening olanzapine to 7.5 mg   11/5: Decreased evening olanzapine to 5 mg   11/11: Moved morning dose of olanzapine to the afternoon as patient appears more agitated in the afternoons/evenings  11/21: Started memantine 5 mg daily   11/26: Discontinued olanzapine 2.5 mg during the afternoon  12/2:   Discontinued memantine 5 mg, daily     Care conference 10/18/24 with daughter Maria C and ex-wife Clifford  - Report that patient has always been paranoid since ex-wife first met him. She describes him always feeling like he is getting ripped off.   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 They reported once incidence in which he made a bid on a job and the client paid him, but he never ended up finishing the job  - Wife and him  officially this year, and since selling the house several months ago, patient has been moving from hotel to hotel due to thinking people are out to get him   - When they were selling their house, patient threatened realtors and felt he was being ripped off   - Clifford reports that she started to be afraid to be around him alone  - When he was found in the hotel, he had a generator full of gasoline, binoculars, bullets, and hunting equipment.     10/21/24: updated daughter on Santos's treatment plan      10/23/24: updated daughter  on Santos's treatment plan      10/25/24: updated daughter on Santos's treatment plan, including plan to try and get MRI brain done under sedation. She said that Santos's grandfather had dementia and his sister has a brain tumor.      10/28/24: updated daughter on Santos's MRI results. She is planning on coming into town soon.      Care conference 11/1/24 with daughters Maria C and Estefani and ex-wife Cliffrod  - Discussed dementia diagnosis   - Clifford asked about plans moving forward. Explained that applying for medical assistance is the first step. Explained that application processing time can be very variable. Informed family that Santos will most likely be staying on this inpatient unit during this time.   - Clifford expressed that their family would like to be the power of /have conservatorship for Santos.   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 Reports that he has bills that he has not paid.   - Maria C is planning to move to [place], and Clifford currently lives in [place]. Family prefer that Santos would in a facility that are near these locations. Discussed with family that they can also try to request a specific location (memory care).   - Clifford reports that he had previously gotten help applying for his social security and medicare, but unsure if it had been approved.   - Informed family that there is a geriatric unit and there might be a transfer if there becomes availability on this unit.   - Family shared that he was completely different just two months ago.   - Estefani shared that his family found his medications in his old truck recently, expressed that it was likely that he was not taking his medications prior to his hospitalization.      11/6/24: updated Maria C on plan to try and transfer Santos to Geriatric unit.      11/11/24: updated Maria C on neurology consult      The risks, benefits, alternatives, and side effects were discussed and understood  by the patient and other caregivers.     Medical Assessment and Plan     Medical diagnoses to be addressed this admission:    # Hypertension  - Continue PTA medications  Furosemide 40 mg daily  Lisinopril 40 mg daily  Metoprolol 50 mg daily  Amlodipine 10 mg daily  Clonidine 0.1 mg BID     # Hyperlipidemia  - Continue PTA Atorvastatin 40 mg     # Hyperparathyroidism  # Hypercalcemia, hypophosphoremia   Increased Ca level to 10.9 and decreased Ph level to 2.3 in the ED. Suspicion patient's hypercalcemia could be contributing to symptoms of psychosis.  - Consulted endocrinology, who started cinacalcet 30 mg BID on 10/13  - 10/16 endocrinology recommended continuing to trend calcium and albumin to make sure patient does not become hypocalcemic and recommended holding cinacalcet   - 10/17, calcium and albumin wnl, endo recommended cinacalcet 30 mg once daily   - 10/21, per endo continue cincaclcet and recheck calcium and albumin on October 24  - 10/23: per endo, patient needs to follow up outpatient for further management of hyperparathyroidism      Medical course: Patient was physically examined by the ED prior to being transferred to the unit and was found to be medically stable and appropriate for admission.      Consults: Psychiatry, Endocrinology (follow-up of hyperthyroidism / hypercalcemia and hypertension), neurology     Checklist     Legal Status: Committed     Safety Assessment:   Behavioral Orders   Procedures    Assault precautions    Code 1 - Restrict to Unit    Routine Programming     As clinically indicated    Status 15     Every 15 minutes.    Status Individual Observation     1:1 during evening shift, & night shift.    If patient refuses overnight presence of staff in his room, it's ok to do 15 min checks through the window blinds.    Patient SIO status reviewed with team/RN.  Please also refer to RN/team documentation for add'l detail.    -SIO staff to monitor following which have contributed to  patient being on SIO:  Pt has psychosis regarding being followed and attacked, very paranoid, HI    -Possible interventions SIO staff could use to support patient's treatment progress:  Redirect and reassure  -When following observed, team will review discontinuation of SIO:  Psychosis improves     Order Specific Question:   CONTINUOUS 24 hours / day     Answer:   Other     Order Specific Question:   Specify distance     Answer:   10 feet, 5 feet when close to the doors     Order Specific Question:   Indications for SIO     Answer:   Assault risk     Order Specific Question:   Indications for SIO     Answer:   Severe intrusiveness       Risk Assessment:  Risk for harm is elevated.  Risk factors: impulsive and past behaviors  Protective factors: family      Disposition: Pending stabilization, medication optimization, & development of a safe discharge plan.     Attestations     Resident without student: This patient was seen and discussed with my attending physician.  [facility]  [facility]iatry Resident Physician    Attestation:  This patient has been seen and evaluated by me, Pete Smith MD.  I have discussed this patient with the house staff team including the resident and/or medical student and I agree with the findings and plan in this note.    I have reviewed today's vital signs, medications, labs and imaging. Pete Smith MD , PhD.

## 2024-12-03 PROCEDURE — 250N000013 HC RX MED GY IP 250 OP 250 PS 637

## 2024-12-03 PROCEDURE — 250N000011 HC RX IP 250 OP 636: Mod: JZ

## 2024-12-03 PROCEDURE — 124N000002 HC R&B MH UMMC

## 2024-12-03 PROCEDURE — 99231 SBSQ HOSP IP/OBS SF/LOW 25: CPT | Mod: GC | Performed by: PSYCHIATRY & NEUROLOGY

## 2024-12-03 RX ORDER — OLANZAPINE 10 MG/2ML
10 INJECTION, POWDER, FOR SOLUTION INTRAMUSCULAR AT BEDTIME
Status: ACTIVE | OUTPATIENT
Start: 2024-12-03

## 2024-12-03 RX ORDER — OLANZAPINE 5 MG/1
5 TABLET, ORALLY DISINTEGRATING ORAL AT BEDTIME
Status: DISPENSED | OUTPATIENT
Start: 2024-12-03

## 2024-12-03 RX ADMIN — AMLODIPINE BESYLATE 10 MG: 5 TABLET ORAL at 09:42

## 2024-12-03 RX ADMIN — FUROSEMIDE 40 MG: 40 TABLET ORAL at 09:42

## 2024-12-03 RX ADMIN — Medication 1 PATCH: at 09:46

## 2024-12-03 RX ADMIN — Medication 3 MG: at 21:35

## 2024-12-03 RX ADMIN — ATORVASTATIN CALCIUM 40 MG: 20 TABLET, FILM COATED ORAL at 09:42

## 2024-12-03 RX ADMIN — CLONIDINE HYDROCHLORIDE 0.1 MG: 0.1 TABLET ORAL at 21:34

## 2024-12-03 RX ADMIN — CINACALCET 30 MG: 30 TABLET ORAL at 09:42

## 2024-12-03 RX ADMIN — Medication 1250 MCG: at 09:43

## 2024-12-03 RX ADMIN — CLONIDINE HYDROCHLORIDE 0.1 MG: 0.1 TABLET ORAL at 09:42

## 2024-12-03 RX ADMIN — LISINOPRIL 40 MG: 10 TABLET ORAL at 09:42

## 2024-12-03 RX ADMIN — OLANZAPINE 10 MG: 10 INJECTION, POWDER, FOR SOLUTION INTRAMUSCULAR at 00:25

## 2024-12-03 RX ADMIN — OLANZAPINE 5 MG: 5 TABLET, ORALLY DISINTEGRATING ORAL at 21:55

## 2024-12-03 ASSESSMENT — ACTIVITIES OF DAILY LIVING (ADL)
ADLS_ACUITY_SCORE: 54
ADLS_ACUITY_SCORE: 64
HYGIENE/GROOMING: INDEPENDENT;PROMPTS
ADLS_ACUITY_SCORE: 64
ORAL_HYGIENE: INDEPENDENT
ADLS_ACUITY_SCORE: 64
ADLS_ACUITY_SCORE: 64
LAUNDRY: UNABLE TO COMPLETE
ADLS_ACUITY_SCORE: 54
ADLS_ACUITY_SCORE: 54
ADLS_ACUITY_SCORE: 64
ADLS_ACUITY_SCORE: 54
ADLS_ACUITY_SCORE: 64
ADLS_ACUITY_SCORE: 64
ADLS_ACUITY_SCORE: 54
ADLS_ACUITY_SCORE: 64
ADLS_ACUITY_SCORE: 64
DRESS: INDEPENDENT
ADLS_ACUITY_SCORE: 54
ADLS_ACUITY_SCORE: 64
ADLS_ACUITY_SCORE: 64
ORAL_HYGIENE: INDEPENDENT;PROMPTS
ADLS_ACUITY_SCORE: 64
DRESS: INDEPENDENT;STREET CLOTHES
ADLS_ACUITY_SCORE: 64
HYGIENE/GROOMING: INDEPENDENT
LAUNDRY: UNABLE TO COMPLETE

## 2024-12-03 NOTE — PLAN OF CARE
"Pt dismissive   Problem: Adult Behavioral Health Plan of Care  Goal: Plan of Care Review  Outcome: Progressing  Flowsheets (Taken 12/2/2024 1630)  Patient Agreement with Plan of Care: agrees     Problem: Psychotic Signs/Symptoms  Goal: Improved Behavioral Control (Psychotic Signs/Symptoms)  Outcome: Progressing     Problem: Anxiety  Goal: Anxiety Reduction or Resolution  Outcome: Progressing   Goal Outcome Evaluation:    Plan of Care Reviewed With: patient      Pt has been visible in the milieu, paced the halls with SIO staff, affect remains flat and blunted, no insight of his mental illness, pt occasionally confused with delusional statement, frequently was asking to be given \"44\" however was able to be redirectable by staff SIO. Pt dismissive with mental health assessment, walked away from the writer. Adequate food/fluid intake, dubious on approach when giving HS medication, denied medication from the writer after two attempts, another RN prompted but the pt declined stated he's not tired yet to take the medication \"I feel fine with no drugs\". RN will try again           "

## 2024-12-03 NOTE — PLAN OF CARE
Team Note Due:  Monday    Assessment/Intervention/Current Symtoms and Care Coordination:  Chart review and met with team, discussed pt progress, symptomology, and response to treatment.  Discussed the discharge plan and any potential impediments to discharge. Clifford Aaron is currently emergency guardian/conservator until 01/20/2025.    Family indicated pt was initially declined from [facility] due to concern for aggression based on what they read in psych notes faxed yesterday. [facility] is open to a meeting with writer and treating MDs to discuss pt further and clarify their concerns as pt has improved significantly since admission.    Per family's request, writer contacted Lorraine at [facility] to request a meeting.    Discharge Plan or Goal:  Memory care facility     Barriers to Discharge:  Patient requires further psychiatric stabilization due to current symptomology, medication management with changes subject to provider, coordination with outside supports, and aftercare planning. Pt is under civil commitment.     Referral Status:    Memory Care facilities currently in process:  [facility]. Tour completed 11/27.  [facility]. Tour completed 11/29. Records sent 12/2/24.  Lorraine (): [email or web address]; [phone]  Isatu (director of nursing): [email or web address]  [facility]. Tour completed 11/29.  [facility]. Tour completed 11/30.    Memory Care facilities pending family review:  [facility].   [facility].  [facility].  [facility].  [facility].  [facility].  [facility].    Memory Care facilities declined:  [facility] (private pay only, no EW).  [facility] (private [facility] only, no EW).  [facility]  (no openings).     Legal Status:  MI Commitment with [facility]  File Number: [id number]  Start and expiration date of commitment: 10/24/24 - 04/24/25    Jordan meds: Haldol, Clozaril, Risperdal, Invega, Zyprexa, Seroquel, Abilify    PPS/CM:  Shelby Chowdary: [phone]  [email or web address]    Contacts:  Maria C Aaron (Daughter): [phone]   Clifford Aaron (ex-wife): [phone]     No Del Angel (guardianship/conservatorship ): [phone]  [email or web address]     Upcoming Meetings and Dates/Important Information and next steps:  Follow up with family regarding list of Memory Care facilities  Discharge planning when appropriate  Pt does not qualify for MA per financial counselor on 11/8/2024. Pt will likely privately pay for Memory Care until he qualifies for MA/waivers in the future.  Pt will need to apply for MA before a [employer] Assessment/SMRT can be completed for waivers.    Provisional Discharge and Change of Status needed at discharge

## 2024-12-03 NOTE — PLAN OF CARE
BEH IP Unit Acuity Rating Score (UARS)  Patient is given one point for every criteria they meet.    CRITERIA SCORING   On a 72 hour hold, court hold, committed, stay of commitment, or revocation. 1    Patient LOS on BEH unit exceeds 20 days. 1  LOS: 52   Patient under guardianship, 55+, otherwise medically complex, or under age 11. 1   Suicide ideation without relief of precipitating factors. 0   Current plan for suicide. 0   Current plan for homicide. 0   Imminent risk or actual attempt to seriously harm another without relief of factors precipitating the attempt. 1   Severe dysfunction in daily living (ex: complete neglect for self care, extreme disruption in vegetative function, extreme deterioration in social interactions). 1   Recent (last 7 days) or current physical aggression in the ED or on unit. 0   Restraints or seclusion episode in past 72 hours. 0   Recent (last 7 days) or current verbal aggression, agitation, yelling, etc., while in the ED or unit. 1 - last 12/3   Active psychosis. 1   Need for constant or near constant redirection (from leaving, from others, etc).  0   Intrusive or disruptive behaviors. 1   Patient requires 3 or more hours of individualized nursing care per 8-hour shift (i.e. for ADLs, meds, therapeutic interventions). 1   TOTAL 9

## 2024-12-03 NOTE — PLAN OF CARE
Problem: Seclusion/Restraint, Behavioral  Goal: Seclusion/Behavioral Restraint Goal: Absence of Harm or Injury  Outcome: Progressing  Sleep hours 4  Pt was awake at the start of shift in his room. He was observed to be agitated, restless, not redirectable, posturing ( making fist at staff), and verbal threatening to staff. Oral Zyprexa Ortega medication was offered to patient several times with education/encouragement, but patient continue to refuse. BELT team was called, and encouraged patient to take his  oral Ortega medication, but still continue to refuse. Patient was then offered the option to take the IM injection without physical hold, pt refused. Then physical hold was applied at 0025 AM. Medication was administered on the left deltoid, patient tolerated procedure.  Face to face done, no injury noted or reported at this time. Doctor was notified and order for physical hold was obtained.  Physical hold ended at 0030 AM. Pt is on 1:1 SIO for assault risk and severe intrusiveness   Environmental safety checks and 15 minutes safety checks were in place. Staff will continue to offer support to pt.

## 2024-12-03 NOTE — PLAN OF CARE
"Pt focused on getting his truck.  He wanting staff to talk to his ex wife about getting his vehicles. Pt states his [place] his ex wife [place].   Staff tried to reassure pt that seems like a safe place for them to be.  Pt continues to want staff to get his truck. Pt came to staff again wants staff to ell his ex wife that his 43 (gun) is clear to . Pt wanting his gums so he can go hunting.   Pt did take am medications w/o issues.  Pt spent time in lounge. Pt social with some other patients.  Pt denies all psych sx's/  Pt able to state the year, date and where he is.  Pt has had no bx issues this shift.       Problem: Adult Behavioral Health Plan of Care  Goal: Plan of Care Review  Outcome: Not Progressing  Goal: Patient-Specific Goal (Individualization)  Description: You can add care plan individualizations to a care plan. Examples of Individualization might be:  \"Parent requests to be called daily at 9am for status\", \"I have a hard time hearing out of my right ear\", or \"Do not touch me to wake me up as it startles  me\".  Outcome: Not Progressing  Goal: Adheres to Safety Considerations for Self and Others  Outcome: Not Progressing  Intervention: Develop and Maintain Individualized Safety Plan  Recent Flowsheet Documentation  Taken 12/3/2024 1100 by Catherine Szymanski, RN  Safety Measures: safety rounds completed  Goal: Absence of New-Onset Illness or Injury  Outcome: Not Progressing  Intervention: Identify and Manage Fall Risk  Recent Flowsheet Documentation  Taken 12/3/2024 1100 by Catherine Szymanski RN  Safety Measures: safety rounds completed  Goal: Optimized Coping Skills in Response to Life Stressors  Outcome: Not Progressing  Goal: Develops/Participates in Therapeutic [place] to Support Successful Transition  Outcome: Not Progressing     Problem: Psychotic Signs/Symptoms  Goal: Improved Behavioral Control (Psychotic Signs/Symptoms)  Outcome: Not Progressing  Goal: Optimal " Cognitive Function (Psychotic Signs/Symptoms)  Outcome: Not Progressing  Goal: Increased Participation and Engagement (Psychotic Signs/Symptoms)  Outcome: Not Progressing  Goal: Improved Mood Symptoms (Psychotic Signs/Symptoms)  Outcome: Not Progressing  Goal: Improved Psychomotor Symptoms (Psychotic Signs/Symptoms)  Outcome: Not Progressing  Goal: Decreased Sensory Symptoms (Psychotic Signs/Symptoms)  Outcome: Not Progressing  Goal: Improved Sleep (Psychotic Signs/Symptoms)  Outcome: Not Progressing  Goal: Enhanced Social, Occupational or Functional Skills (Psychotic Signs/Symptoms)  Outcome: Not Progressing     Problem: Depression  Goal: Improved Mood  Outcome: Not Progressing     Problem: Suicidal Behavior  Goal: Suicidal Behavior is Absent or Managed  Outcome: Not Progressing     Problem: Anxiety  Goal: Anxiety Reduction or Resolution  Outcome: Not Progressing     Problem: Sleep Disturbance  Goal: Adequate Sleep/Rest  Outcome: Not Progressing     Problem: Seclusion/Restraint, Behavioral  Goal: Seclusion/Behavioral Restraint Goal: Absence of Harm or Injury  Outcome: Not Progressing  Intervention: Implement Least Restrictive Safety Strategies  Recent Flowsheet Documentation  Taken 12/3/2024 1100 by Catherine Szymanski RN  Safety Measures: safety rounds completed     Problem: Pain Acute  Goal: Optimal Pain Control and Function  Outcome: Not Progressing   Goal Outcome Evaluation:

## 2024-12-03 NOTE — CARE PLAN
12/03/24 0025   Seclusion or Restraint Order Upon Initiation and With Every Order   Attending Provider Notified Yes   Attending Provider's Name Dr. González Garcia   In Person Face to Face Assessment Conducted Yes-Eval of pt's immediate situation, reaction to intervention, complete review of systems assessment, behavioral assessment & review/assessment of hx, drugs & meds, recent labs, etc, behavioral condition, need to continue/terminate restraint/seclusion   RN Notified Provider of Result of Face to Face Yes   Describe adverse physical outcome N/A   Describe actions taken N/A   Describe follow-up needed None

## 2024-12-03 NOTE — PROGRESS NOTES
"  ----------------------------------------------------------------------------------------------------------  [facility]  Psychiatry Progress Note  Hospital Day #52     Interim History:     The patient's care was discussed with the treatment team and chart notes were reviewed.    Vitals: VSS  Sleep: 4 hours (12/03/24 0633)  Scheduled medications: Took all scheduled medications as prescribed  Psychiatric PRN medications:   None  Staff Report:   Pt was awake at the start of shift in his room. He was observed to be agitated, restless, not redirectable, posturing ( making fist at staff), and verbal threatening to staff. Oral Zyprexa Ortega medication was offered to patient several times with education/encouragement, but patient continue to refuse. BELT team was called, and encouraged patient to take his  oral Ortega medication, but still continue to refuse. Patient was then offered the option to take the IM injection without physical hold, pt refused. Then physical hold was applied at 0025 AM. Medication was administered on the left deltoid, patient tolerated procedure.  Face to face done, no injury noted or reported at this time. Doctor was notified and order for physical hold was obtained.  Physical hold ended at 0030 AM. Pt is on 1:1 SIO for assault risk and severe intrusiveness   Environmental safety checks and 15 minutes safety checks were in place.       - Please see staff notes for details.      Subjective:     Patient Interview:  Patient stated he was doing good. Said there was a good group in the lobby and came into his room to take a nap. Patient said he was forced to take the medications yesterday but said it was \"kind of fun.\" Patient said he got sleep last night and is well rested. Patient said he does not feel pain anywhere. Says his ankle feels fine now. Was wondering about masks and whether they are needed. Said Brendan thomas.       ROS:  See above     " Objective:     Vitals:  /75 (BP Location: Left arm, Patient Position: Sitting, Cuff Size: Adult Large)   Pulse 57   Temp 97.2  F (36.2  C) (Oral)   Resp 16   Wt 109.6 kg (241 lb 9.6 oz)   SpO2 98%   BMI 39.00 kg/m      Allergies:  No Known Allergies    Current Medications:  Scheduled:  Current Facility-Administered Medications   Medication Dose Route Frequency Provider Last Rate Last Admin    acetaminophen (TYLENOL) tablet 650 mg  650 mg Oral Q4H PRN Nikki Caceres MD   650 mg at 11/30/24 1133    alum & mag hydroxide-simethicone (MAALOX) suspension 30 mL  30 mL Oral Q4H PRN Nikki Caceres MD   30 mL at 10/17/24 0837    amLODIPine (NORVASC) tablet 10 mg  10 mg Oral Daily Presley Barrera MD   10 mg at 12/02/24 0812    atorvastatin (LIPITOR) tablet 40 mg  40 mg Oral Daily Nikki Caceres MD   40 mg at 12/02/24 0812    cholecalciferol (VITAMIN D3) capsule 1,250 mcg  1,250 mcg Oral Q7 Days Sirisha EveliaDO adalid   1,250 mcg at 11/26/24 1526    cinacalcet (SENSIPAR) tablet 30 mg  30 mg Oral Daily Presley Barrera MD   30 mg at 12/02/24 0812    cloNIDine (CATAPRES) tablet 0.1 mg  0.1 mg Oral BID Presley Barrera MD   0.1 mg at 12/02/24 0812    furosemide (LASIX) tablet 40 mg  40 mg Oral Daily Presley aBrrera MD   40 mg at 12/02/24 0812    gabapentin (NEURONTIN) capsule 100 mg  100 mg Oral Q6H PRN Nikki Caceres MD   100 mg at 11/15/24 0418    hydrocortisone (CORTAID) 0.5 % cream   Topical Daily PRN Savi Chamorro MD        lisinopril (ZESTRIL) tablet 40 mg  40 mg Oral Daily Presley Barrera MD   40 mg at 12/02/24 0812    melatonin tablet 3 mg  3 mg Oral At Bedtime Presley Barrera MD   3 mg at 12/01/24 2235    metoprolol succinate ER (TOPROL XL) 24 hr tablet 50 mg  50 mg Oral Daily Presley Barrera MD   50 mg at 12/02/24 0813    nicotine (NICODERM CQ) 14 MG/24HR 24 hr patch 1 patch  1 patch Transdermal Daily Evelia Grimm DO   1 patch at 12/02/24 0821    nicotine (NICODERM CQ) 21 MG/24HR 24 hr patch  1 patch  1 patch Transdermal Daily PRN Britt Kang MD   1 patch at 10/13/24 1611    nicotine (NICORETTE) gum 2 mg  2 mg Buccal Q1H PRN González Garcia MD   2 mg at 10/24/24 1254    OLANZapine zydis (zyPREXA) ODT tab 5 mg  5 mg Oral At Bedtime González Garcia MD        Or    OLANZapine (zyPREXA) injection 10 mg  10 mg Intramuscular At Bedtime González Garcia MD        OLANZapine (zyPREXA) tablet 5 mg  5 mg Oral TID PRN Evelia Grimm DO   5 mg at 11/24/24 0436    Or    OLANZapine (zyPREXA) injection 5 mg  5 mg Intramuscular TID PRN Evelia Grimm DO        polyethylene glycol (MIRALAX) Packet 17 g  17 g Oral Daily PRN Nikki Caceres MD        traZODone (DESYREL) half-tab 25 mg  25 mg Oral At Bedtime PRN Evelia Grimm DO   25 mg at 11/29/24 0148    Or    traZODone (DESYREL) tablet 50 mg  50 mg Oral At Bedtime PRN Evelia Grimm DO           PRN:  Current Facility-Administered Medications   Medication Dose Route Frequency Provider Last Rate Last Admin    acetaminophen (TYLENOL) tablet 650 mg  650 mg Oral Q4H PRN Nikki Caceres MD   650 mg at 11/30/24 1133    alum & mag hydroxide-simethicone (MAALOX) suspension 30 mL  30 mL Oral Q4H PRN Nikki Caceres MD   30 mL at 10/17/24 0837    amLODIPine (NORVASC) tablet 10 mg  10 mg Oral Daily Presley Barrera MD   10 mg at 12/02/24 0812    atorvastatin (LIPITOR) tablet 40 mg  40 mg Oral Daily Nikki Caceres MD   40 mg at 12/02/24 0812    cholecalciferol (VITAMIN D3) capsule 1,250 mcg  1,250 mcg Oral Q7 Days Evelia Grimm DO   1,250 mcg at 11/26/24 1526    cinacalcet (SENSIPAR) tablet 30 mg  30 mg Oral Daily Presley Barrera MD   30 mg at 12/02/24 0812    cloNIDine (CATAPRES) tablet 0.1 mg  0.1 mg Oral BID Presley Barrera MD   0.1 mg at 12/02/24 0812    furosemide (LASIX) tablet 40 mg  40 mg Oral Daily Presley Barrera MD   40 mg at 12/02/24 0812    gabapentin (NEURONTIN) capsule 100 mg  100 mg Oral Q6H PRN Nikki Caceres MD   100 mg at 11/15/24 0416     hydrocortisone (CORTAID) 0.5 % cream   Topical Daily PRN Savi Chamorro MD        lisinopril (ZESTRIL) tablet 40 mg  40 mg Oral Daily Presley Barrera MD   40 mg at 12/02/24 0812    melatonin tablet 3 mg  3 mg Oral At Bedtime Presley Barrera MD   3 mg at 12/01/24 2235    metoprolol succinate ER (TOPROL XL) 24 hr tablet 50 mg  50 mg Oral Daily Presley Barrera MD   50 mg at 12/02/24 0813    nicotine (NICODERM CQ) 14 MG/24HR 24 hr patch 1 patch  1 patch Transdermal Daily Evelia Grimm DO   1 patch at 12/02/24 0821    nicotine (NICODERM CQ) 21 MG/24HR 24 hr patch 1 patch  1 patch Transdermal Daily PRN Britt Kang MD   1 patch at 10/13/24 1611    nicotine (NICORETTE) gum 2 mg  2 mg Buccal Q1H PRN González Garcia MD   2 mg at 10/24/24 1254    OLANZapine zydis (zyPREXA) ODT tab 5 mg  5 mg Oral At Bedtime González Garcia MD        Or    OLANZapine (zyPREXA) injection 10 mg  10 mg Intramuscular At Bedtime González Garcia MD        OLANZapine (zyPREXA) tablet 5 mg  5 mg Oral TID PRN Evelia Grimm DO   5 mg at 11/24/24 0436    Or    OLANZapine (zyPREXA) injection 5 mg  5 mg Intramuscular TID PRN Evelia Grimm DO        polyethylene glycol (MIRALAX) Packet 17 g  17 g Oral Daily PRN Nikki Caceres MD        traZODone (DESYREL) half-tab 25 mg  25 mg Oral At Bedtime PRN Evelia Grimm DO   25 mg at 11/29/24 0148    Or    traZODone (DESYREL) tablet 50 mg  50 mg Oral At Bedtime PRN Evelia Grimm DO         Labs and Imaging:  Data this admission:  - CBC unremarkable  - CMP unremarkable  - TSH normal  - UDS negative  - Vit D low  - Hgb A1c 5.9 (10/13/24)  - Lipids unremarkable  - Vit B12 normal  - Folate normal  - Urinalysis unremarkable  - EKG normal sinus rhythm, QTc 390 ms  - Head CT showed no acute changes  - HIV non reactive  - Treponema antibody non reactive  - ESR wnl   - Ceruloplasmin wnl   - BOOGIE negative  - Lyme negative      Parathyroid hormone:   10/13: 85     Calcium:  10/14: 11.4  10/16: 10.3  10/17:  "10.3  10/19: 9.8  10/21: 10.6  10/23: 10.3     Albumin:  10/14: 4.3  10/16: 4.3  10/17: 4.1  10/19: 4.2  10/21: 4.5  10/23: 4.3      CXR:   Impression:   1. No definite radiographic evidence of asbestosis. If clinically  indicated, consider evaluation with high resolution chest CT.  2. Nonspecific left costophrenic blunting. Given symmetrically low  lung volumes may be related to poor inspiratory effort/timing.  3. Pulmonary vascular congestion.     MRI:   IMPRESSION:  1. No acute intracranial pathology.  2. No focal lesion or structural abnormality.   3. Symmetric frontoparietal cortical volume loss slightly more than  expected for age.     Mental Status Exam:   Oriented to: Oriented to self but not time and place  General:  Awake and Alert  Appearance:  appears stated age, Grooming is adequate, and Dressed in his own clothes  Behavior/Attitude:  Calm and Easily distracted  Eye Contact: Appropriate for conversation  Psychomotor: No evidence of tics, dystonia, or tardive dyskinesia  no catatonia present  Speech:  appropriate volume/tone  Language: Fluent in English with appropriate syntax and vocabulary.  Mood:  \"doing good\"  Affect:  confused  Thought Process:  disorganized and confused  Thought Content:   SI/HI/ delusion of confusion  . Patient denies paranoia  Associations:  loose  Insight:  limited   Judgment:  limited   Impulse control: limited  Attention Span:   decreased  Concentration:   distractible  Recent and Remote Memory:   remote memory intact, recent memory impaired  Fund of Knowledge: average  Muscle Strength and Tone: normal  Gait and Station: Normal     Psychiatric Assessment     Estevan Aaron is a 64 year old male with previous psychiatric diagnoses of GEORGINA admitted from the ER on 10/12/2024 due to concern for HI and psychosis in the context of medical issues (hyperthyroidism, hypercalcemia), psychosocial stressors including with recent divorce and selling his home. This is the patient's first " psychiatric hospitalization. Significant symptoms on admission included delusions of persecution with grandiose beliefs, homicidal ideations, as well as disorganized thinking and behavior. The MSE on admission was pertinent for a thought process which was perseverative, circumstantial, tangential, disorganized and tangential; with rambling and looseness of associations. Psychological contributions to presentation included lack of insight. Social factors contributing to presentation included isolation, recent divorce, selling his house, and moving from hotel to hotel. Biological contributions to presentation included a history of hyperparathyroidism with chronic hypercalcemia per charts as well as a history of methamphetamine use per collateral from ex-wife.     History was difficult to obtain due to the patient's severe disorganized thinking on interview; he was observed with persecutory delusions pertaining to the realtor and gang members, disorganized behavior as these delusions have led him to flee to different hotels to stay safe/ has called  complaining of being targeted and auditory hallucinations of voices for the past 12 months. Per collateral from ex-wife, Santos has had paranoid ideations since they first met. He has always felt like people are out to get him or trying to rip him off. She says that he has had visual hallucinations (he will point things out that the rest of the family can't see) for a long time, but the family would just go along with him to avoid making him angry. This timeline and presentation could be consistent with diagnosis of paranoid personality disorder. Things began to worsen around the beginning of the COVID pandemic, when Santos became more isolated and the family started to notice cognitive issues such as impaired memory. Immediately prior to this hospitalization, the ex-wife found Santos in a hotel room with a generator full of gasoline, binoculars, and hunting equipment. This time  course does not suggest acute psychosis, however given the patient has never had a full psychiatric work-up, we completed a first-episode psychosis work-up.      Other things that could be contributing to his presentation is hyperparathyroidism with hypercalcemia. However, patient's calcium returned to normal limits on 10/16, and patient continues to have psychosis so likely not a significant contributing factor to patient's presentation.      Patient also continues to be disoriented and his behaviors appear to worsen in the evenings. This raised concern for underlying neurocognitive disorder with delirium. Patient received MRI brain with and without contrast which showed frontal and parietal atrophy greater than would be expected for patient's age. This is consistent with neurocognitive disorder. Patient has continued to improve with decreasing his antipsychotic. Working to get patient transferred to geriatric psychiatry in the short term and to memory care long term.      Given that he currently has psychosis, patient warrants inpatient psychiatric hospitalization to maintain his safety.     Psychiatric Plan by Diagnosis   Today's changes:  None  # Major Neurocognitive Disorder  # Rule out paranoid personality disorder  1. Medications:  - Olanzapine 5 mg at bedtime  - Neurology consult 11/8/24, recommend outpatient follow-up and neuropsychiatric testing     2. Pertinent Labs/Monitoring:   - See above     3. Additional Plans:  - Patient will be treated in therapeutic milieu with appropriate individual and group therapies as described     # Unspecified anxiety vs Generalized Anxiety Disorder  - Monitor for symptoms.  - Fluoxetine held due to suspicion of ongoing manic symptoms     Psychiatric Hospital Course:      Estevan Aaron was admitted to [facility] on a 72 hour hold.   Medications:  PTA fluoxetine was held due to concern for worsening of zelalem   New medications started at the time of admission include Zyprexa.    Increased olanzapine 10 mg at bedtime was to 10 mg BID (10/14)   Increased olanzapine 10 mg BID to 10 mg during day and 15 mg at bedtime (10/15)  10/21: increased olanzapine pm dose from 15 to 20 mg  10/23: started melatonin 3 mg at 7 pm to help patient with circadian rhythm as he has been staying up throughout the night and sleeping a lot during the day and there is some concern for delirium   10/24: consulted anesthesia for MRI brain   10/28: MRI brain complete, decrease AM olanzapine from 10 to 5 mg  10/29: Morning olanzapine decreased to 2.5 mg, evening olanzapine decreased to 15 mg   11/1: Decreased evening olanzapine to 10 mg   11/4: Decreased evening olanzapine to 7.5 mg   11/5: Decreased evening olanzapine to 5 mg   11/11: Moved morning dose of olanzapine to the afternoon as patient appears more agitated in the afternoons/evenings  11/21: Started memantine 5 mg daily   11/26: Discontinued olanzapine 2.5 mg during the afternoon  12/2:   Discontinued memantine 5 mg, daily     Care conference 10/18/24 with daughter Maria C and ex-wife Clifford  - Report that patient has always been paranoid since ex-wife first met him. She describes him always feeling like he is getting ripped off.   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 They reported once incidence in which he made a bid on a job and the client paid him, but he never ended up finishing the job  - Wife and him  officially this year, and since selling the house several months ago, patient has been moving from hotel to hotel  due to thinking people are out to get him   - When they were selling their house, patient threatened realtors and felt he was being ripped off   - Clifford reports that she started to be afraid to be around him alone  - When he was found in the hotel, he had a generator full of gasoline, binoculars, bullets, and hunting equipment.     10/21/24: updated daughter on Santos's treatment plan      10/23/24: updated daughter on Santos's treatment plan      10/25/24: updated daughter on Santos's treatment plan, including plan to try and get MRI brain done under sedation. She said that Santos's grandfather had dementia and his sister has a brain tumor.      10/28/24: updated daughter on Santos's MRI results. She is planning on coming into town soon.      Care conference 11/1/24 with daughters Maria C and Estefani and ex-wife Clifford  - Discussed dementia diagnosis   - Clifford asked about plans moving forward. Explained that applying for medical assistance is the first step. Explained that application processing time can be very variable. Informed family that Santos will most likely be staying on this inpatient unit during this time.   - Clifford expressed that their family would like to be the power of /have conservatorship for Santos.   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 Reports that he has bills that he has not paid.   - Maria C is planning to move to [place], and Clifford currently lives in [place]. Family prefer that Santos would in a facility that are near these locations. Discussed with family that they can also try to request a specific location (memory care).   - Clifford reports that he had previously gotten help applying for his social security and medicare, but unsure if it had been approved.   - Informed family that there is a geriatric unit and there might be a transfer if there becomes availability on this unit.   - Family shared that he was completely different  just two months ago.   - Estefani shared that his family found his medications in his old truck recently, expressed that it was likely that he was not taking his medications prior to his hospitalization.      11/6/24: updated Maria C on plan to try and transfer Santos to Geriatric unit.      11/11/24: updated Maria C on neurology consult      The risks, benefits, alternatives, and side effects were discussed and understood by the patient and other caregivers.     Medical Assessment and Plan     Medical diagnoses to be addressed this admission:    # Hypertension  - Continue PTA medications  Furosemide 40 mg daily  Lisinopril 40 mg daily  Metoprolol 50 mg daily  Amlodipine 10 mg daily  Clonidine 0.1 mg BID     # Hyperlipidemia  - Continue PTA Atorvastatin 40 mg     # Hyperparathyroidism  # Hypercalcemia, hypophosphoremia   Increased Ca level to 10.9 and decreased Ph level to 2.3 in the ED. Suspicion patient's hypercalcemia could be contributing to symptoms of psychosis.  - Consulted endocrinology, who started cinacalcet 30 mg BID on 10/13  - 10/16 endocrinology recommended continuing to trend calcium and albumin to make sure patient does not become hypocalcemic and recommended holding cinacalcet   - 10/17, calcium and albumin wnl, endo recommended cinacalcet 30 mg once daily   - 10/21, per endo continue cincaclcet and recheck calcium and albumin on October 24  - 10/23: per endo, patient needs to follow up outpatient for further management of hyperparathyroidism      Medical course: Patient was physically examined by the ED prior to being transferred to the unit and was found to be medically stable and appropriate for admission.      Consults: Psychiatry, Endocrinology (follow-up of hyperthyroidism / hypercalcemia and hypertension), neurology     Checklist     Legal Status: Committed     Safety Assessment:   Behavioral Orders   Procedures    Assault precautions    Code 1 - Restrict to Unit    Routine Programming     As  clinically indicated    Status 15     Every 15 minutes.    Status Individual Observation     1:1 during evening shift, & night shift.    If patient refuses overnight presence of staff in his room, it's ok to do 15 min checks through the window blinds.    Patient SIO status reviewed with team/RN.  Please also refer to RN/team documentation for add'l detail.    -SIO staff to monitor following which have contributed to patient being on SIO:  Pt has psychosis regarding being followed and attacked, very paranoid, HI    -Possible interventions SIO staff could use to support patient's treatment progress:  Redirect and reassure  -When following observed, team will review discontinuation of SIO:  Psychosis improves     Order Specific Question:   CONTINUOUS 24 hours / day     Answer:   Other     Order Specific Question:   Specify distance     Answer:   10 feet, 5 feet when close to the doors     Order Specific Question:   Indications for SIO     Answer:   Assault risk     Order Specific Question:   Indications for SIO     Answer:   Severe intrusiveness       Risk Assessment:  Risk for harm is elevated.  Risk factors: impulsive and past behaviors  Protective factors: family      Disposition: Pending stabilization, medication optimization, & development of a safe discharge plan.     Attestations     Resident without student: This patient was seen and discussed with my attending physician.  [facility]  [facility]iatry Resident Physician    Attestation:  This patient has been seen and evaluated by me, Pete Smith MD.  I have discussed this patient with the house staff team including the resident and/or medical student and I agree with the findings and plan in this note.    I have reviewed today's vital signs, medications, labs and imaging. Pete Smith MD , PhD.

## 2024-12-04 PROCEDURE — 250N000013 HC RX MED GY IP 250 OP 250 PS 637

## 2024-12-04 PROCEDURE — 99254 IP/OBS CNSLTJ NEW/EST MOD 60: CPT | Performed by: PHYSICIAN ASSISTANT

## 2024-12-04 PROCEDURE — 93010 ELECTROCARDIOGRAM REPORT: CPT | Performed by: INTERNAL MEDICINE

## 2024-12-04 PROCEDURE — 124N000002 HC R&B MH UMMC

## 2024-12-04 PROCEDURE — 99231 SBSQ HOSP IP/OBS SF/LOW 25: CPT | Mod: GC | Performed by: PSYCHIATRY & NEUROLOGY

## 2024-12-04 PROCEDURE — 99222 1ST HOSP IP/OBS MODERATE 55: CPT | Performed by: PHYSICIAN ASSISTANT

## 2024-12-04 PROCEDURE — 93005 ELECTROCARDIOGRAM TRACING: CPT

## 2024-12-04 RX ADMIN — FUROSEMIDE 40 MG: 40 TABLET ORAL at 09:25

## 2024-12-04 RX ADMIN — CINACALCET 30 MG: 30 TABLET ORAL at 09:24

## 2024-12-04 RX ADMIN — CLONIDINE HYDROCHLORIDE 0.1 MG: 0.1 TABLET ORAL at 20:25

## 2024-12-04 RX ADMIN — AMLODIPINE BESYLATE 10 MG: 5 TABLET ORAL at 09:25

## 2024-12-04 RX ADMIN — LISINOPRIL 40 MG: 10 TABLET ORAL at 09:24

## 2024-12-04 RX ADMIN — ATORVASTATIN CALCIUM 40 MG: 20 TABLET, FILM COATED ORAL at 09:24

## 2024-12-04 RX ADMIN — OLANZAPINE 5 MG: 5 TABLET, ORALLY DISINTEGRATING ORAL at 20:25

## 2024-12-04 RX ADMIN — CLONIDINE HYDROCHLORIDE 0.1 MG: 0.1 TABLET ORAL at 09:25

## 2024-12-04 RX ADMIN — Medication 3 MG: at 20:25

## 2024-12-04 RX ADMIN — Medication 1 PATCH: at 09:28

## 2024-12-04 ASSESSMENT — ACTIVITIES OF DAILY LIVING (ADL)
ADLS_ACUITY_SCORE: 54
ADLS_ACUITY_SCORE: 54
ADLS_ACUITY_SCORE: 64
HYGIENE/GROOMING: INDEPENDENT;PROMPTS
ADLS_ACUITY_SCORE: 54
ADLS_ACUITY_SCORE: 64
ADLS_ACUITY_SCORE: 54
DRESS: INDEPENDENT;STREET CLOTHES
ADLS_ACUITY_SCORE: 54
DRESS: INDEPENDENT
ADLS_ACUITY_SCORE: 64
HYGIENE/GROOMING: HANDWASHING;INDEPENDENT
ADLS_ACUITY_SCORE: 54
ADLS_ACUITY_SCORE: 54
ADLS_ACUITY_SCORE: 64
ORAL_HYGIENE: INDEPENDENT;PROMPTS
ADLS_ACUITY_SCORE: 64
LAUNDRY: UNABLE TO COMPLETE
ORAL_HYGIENE: INDEPENDENT

## 2024-12-04 NOTE — PROGRESS NOTES
"  ----------------------------------------------------------------------------------------------------------  [facility]  Psychiatry Progress Note  Hospital Day #53     Interim History:     The patient's care was discussed with the treatment team and chart notes were reviewed.    Vitals: VSS  Sleep: 5.75 hours (12/04/24 0600)  Scheduled medications: Took all scheduled medications as prescribed  Psychiatric PRN medications:   None  Staff Report:   -Pt has been in and out of his room, seen on the phone couple times talking.  -Affect remains flat and labile, he periodically comes to the desk with statements of wanting his car and go hunt.   -Pt dismissive with mental health assessment questions, no insight of his mental health status,   -Denied physical pain, compliant with medications with prompts and encouragement.   -Food/intake adequate      - Please see staff notes for details.      Subjective:     Patient Interview:  Patient was on the phone. Wanted to have Ileana with the family. Patient reported his sleep was good. Mood is somewhat down because he needed to get in touch with family but people were busy. Said wife found a place for him to live but he hasn't heard from her. [place] a couple weeks ago. No chest pain. Said hands and feet are always swollen. No pain in the feet. \"Are you going to respect me on the 65 so I can get out of here.\" Was saying that the team are trapping him.    ROS:  See above     Objective:     Vitals:  /75 (BP Location: Right arm, Patient Position: Sitting, Cuff Size: Adult Large)   Pulse 58   Temp 97  F (36.1  C) (Temporal)   Resp 16   Wt 109.6 kg (241 lb 9.6 oz)   SpO2 98%   BMI 39.00 kg/m      Allergies:  No Known Allergies    Current Medications:  Scheduled:  Current Facility-Administered Medications   Medication Dose Route Frequency Provider Last Rate Last Admin    acetaminophen (TYLENOL) tablet 650 mg  650 mg Oral Q4H PRN " Nikki Caceres MD   650 mg at 11/30/24 1133    alum & mag hydroxide-simethicone (MAALOX) suspension 30 mL  30 mL Oral Q4H PRN Nikki Caceres MD   30 mL at 10/17/24 0837    amLODIPine (NORVASC) tablet 10 mg  10 mg Oral Daily Presley Barrera MD   10 mg at 12/03/24 0942    atorvastatin (LIPITOR) tablet 40 mg  40 mg Oral Daily Nikki Caceres MD   40 mg at 12/03/24 0942    cholecalciferol (VITAMIN D3) capsule 1,250 mcg  1,250 mcg Oral Q7 Days Evelia Grimm DO   1,250 mcg at 12/03/24 0943    cinacalcet (SENSIPAR) tablet 30 mg  30 mg Oral Daily Presley Barrera MD   30 mg at 12/03/24 0942    cloNIDine (CATAPRES) tablet 0.1 mg  0.1 mg Oral BID Presley Barrera MD   0.1 mg at 12/03/24 2134    furosemide (LASIX) tablet 40 mg  40 mg Oral Daily Presley Barrera MD   40 mg at 12/03/24 0942    gabapentin (NEURONTIN) capsule 100 mg  100 mg Oral Q6H PRN Nikki Caceres MD   100 mg at 11/15/24 0418    hydrocortisone (CORTAID) 0.5 % cream   Topical Daily PRN Savi Chamorro MD        lisinopril (ZESTRIL) tablet 40 mg  40 mg Oral Daily Presley Barrera MD   40 mg at 12/03/24 0942    melatonin tablet 3 mg  3 mg Oral At Bedtime Presley Barrera MD   3 mg at 12/03/24 2135    metoprolol succinate ER (TOPROL XL) 24 hr tablet 50 mg  50 mg Oral Daily Presley Barrera MD   50 mg at 12/02/24 0813    nicotine (NICODERM CQ) 14 MG/24HR 24 hr patch 1 patch  1 patch Transdermal Daily Evelia Grimm DO   1 patch at 12/03/24 0946    nicotine (NICODERM CQ) 21 MG/24HR 24 hr patch 1 patch  1 patch Transdermal Daily PRN Britt Kang MD   1 patch at 10/13/24 1611    nicotine (NICORETTE) gum 2 mg  2 mg Buccal Q1H PRN González Garcia MD   2 mg at 10/24/24 1254    OLANZapine zydis (zyPREXA) ODT tab 5 mg  5 mg Oral At Bedtime González Garcia MD   5 mg at 12/03/24 2155    Or    OLANZapine (zyPREXA) injection 10 mg  10 mg Intramuscular At Bedtime González Garcia MD        OLANZapine (zyPREXA) tablet 5 mg  5 mg Oral TID PRN Sirisha  DO Evelia   5 mg at 11/24/24 0436    Or    OLANZapine (zyPREXA) injection 5 mg  5 mg Intramuscular TID PRN Evelia Grimm DO        polyethylene glycol (MIRALAX) Packet 17 g  17 g Oral Daily PRN Nikki Caceres MD        traZODone (DESYREL) half-tab 25 mg  25 mg Oral At Bedtime PRN Evelia Grimm DO   25 mg at 11/29/24 0148    Or    traZODone (DESYREL) tablet 50 mg  50 mg Oral At Bedtime PRN Evelia Grimm DO           PRN:  Current Facility-Administered Medications   Medication Dose Route Frequency Provider Last Rate Last Admin    acetaminophen (TYLENOL) tablet 650 mg  650 mg Oral Q4H PRN Nikki Caceres MD   650 mg at 11/30/24 1133    alum & mag hydroxide-simethicone (MAALOX) suspension 30 mL  30 mL Oral Q4H PRN Nikki Caceres MD   30 mL at 10/17/24 0837    amLODIPine (NORVASC) tablet 10 mg  10 mg Oral Daily Presley Barrera MD   10 mg at 12/03/24 0942    atorvastatin (LIPITOR) tablet 40 mg  40 mg Oral Daily Nikki Caceres MD   40 mg at 12/03/24 0942    cholecalciferol (VITAMIN D3) capsule 1,250 mcg  1,250 mcg Oral Q7 Days Evelia Grimm DO   1,250 mcg at 12/03/24 0943    cinacalcet (SENSIPAR) tablet 30 mg  30 mg Oral Daily Presley Barrera MD   30 mg at 12/03/24 0942    cloNIDine (CATAPRES) tablet 0.1 mg  0.1 mg Oral BID Presley Barrera MD   0.1 mg at 12/03/24 2134    furosemide (LASIX) tablet 40 mg  40 mg Oral Daily Presley Brarera MD   40 mg at 12/03/24 0942    gabapentin (NEURONTIN) capsule 100 mg  100 mg Oral Q6H PRN Nikki Caceres MD   100 mg at 11/15/24 0418    hydrocortisone (CORTAID) 0.5 % cream   Topical Daily PRN Savi Chamorro MD        lisinopril (ZESTRIL) tablet 40 mg  40 mg Oral Daily Presley Barrera MD   40 mg at 12/03/24 0942    melatonin tablet 3 mg  3 mg Oral At Bedtime Presley Barrera MD   3 mg at 12/03/24 2135    metoprolol succinate ER (TOPROL XL) 24 hr tablet 50 mg  50 mg Oral Daily Presley Barrera MD   50 mg at 12/02/24 0813    nicotine (NICODERM CQ) 14 MG/24HR 24 hr patch 1  patch  1 patch Transdermal Daily Evelia Grimm DO   1 patch at 12/03/24 0946    nicotine (NICODERM CQ) 21 MG/24HR 24 hr patch 1 patch  1 patch Transdermal Daily PRN Britt Kang MD   1 patch at 10/13/24 1611    nicotine (NICORETTE) gum 2 mg  2 mg Buccal Q1H PRN González Garcia MD   2 mg at 10/24/24 1254    OLANZapine zydis (zyPREXA) ODT tab 5 mg  5 mg Oral At Bedtime González Garcia MD   5 mg at 12/03/24 2155    Or    OLANZapine (zyPREXA) injection 10 mg  10 mg Intramuscular At Bedtime González Garcia MD        OLANZapine (zyPREXA) tablet 5 mg  5 mg Oral TID PRN Evelia Grimm DO   5 mg at 11/24/24 0436    Or    OLANZapine (zyPREXA) injection 5 mg  5 mg Intramuscular TID PRN Evelia Grimm DO        polyethylene glycol (MIRALAX) Packet 17 g  17 g Oral Daily PRN Nikki Caceres MD        traZODone (DESYREL) half-tab 25 mg  25 mg Oral At Bedtime PRN Evelia Grimm DO   25 mg at 11/29/24 0148    Or    traZODone (DESYREL) tablet 50 mg  50 mg Oral At Bedtime PRN Evelia Grimm DO         Labs and Imaging:  Data this admission:  - CBC unremarkable  - CMP unremarkable  - TSH normal  - UDS negative  - Vit D low  - Hgb A1c 5.9 (10/13/24)  - Lipids unremarkable  - Vit B12 normal  - Folate normal  - Urinalysis unremarkable  - EKG normal sinus rhythm, QTc 390 ms  - Head CT showed no acute changes  - HIV non reactive  - Treponema antibody non reactive  - ESR wnl   - Ceruloplasmin wnl   - BOOGIE negative  - Lyme negative      Parathyroid hormone:   10/13: 85     Calcium:  10/14: 11.4  10/16: 10.3  10/17: 10.3  10/19: 9.8  10/21: 10.6  10/23: 10.3     Albumin:  10/14: 4.3  10/16: 4.3  10/17: 4.1  10/19: 4.2  10/21: 4.5  10/23: 4.3      CXR:   Impression:   1. No definite radiographic evidence of asbestosis. If clinically  indicated, consider evaluation with high resolution chest CT.  2. Nonspecific left costophrenic blunting. Given symmetrically low  lung volumes may be related to poor inspiratory effort/timing.  3. Pulmonary  "vascular congestion.     MRI:   IMPRESSION:  1. No acute intracranial pathology.  2. No focal lesion or structural abnormality.   3. Symmetric frontoparietal cortical volume loss slightly more than  expected for age.     Mental Status Exam:   Oriented to: Oriented to self but not time and place  General:  Awake and Alert  Appearance:  appears stated age, Grooming is adequate, and Dressed in his own clothes  Behavior/Attitude:  Calm and Easily distracted  Eye Contact: Appropriate for conversation  Psychomotor: No evidence of tics, dystonia, or tardive dyskinesia  no catatonia present  Speech:  appropriate volume/tone  Language: Fluent in English with appropriate syntax and vocabulary.  Mood:  \"low\"  Affect:  confused  Thought Process:  disorganized and confused  Thought Content:   no SI/HI/ delusion of confusion . Patient denies paranoia  Associations:  loose  Insight:  limited   Judgment:  limited   Impulse control: limited  Attention Span:   decreased  Concentration:   distractible  Recent and Remote Memory:   remote memory intact, recent memory impaired  Fund of Knowledge: average  Muscle Strength and Tone: normal  Gait and Station: Normal     Psychiatric Assessment     Estevan Aaron is a 64 year old male with previous psychiatric diagnoses of GEORGINA admitted from the ER on 10/12/2024 due to concern for HI and psychosis in the context of medical issues (hyperthyroidism, hypercalcemia), psychosocial stressors including with recent divorce and selling his home. This is the patient's first psychiatric hospitalization. Significant symptoms on admission included delusions of persecution with grandiose beliefs, homicidal ideations, as well as disorganized thinking and behavior. The MSE on admission was pertinent for a thought process which was perseverative, circumstantial, tangential, disorganized and tangential; with rambling and looseness of associations. Psychological contributions to presentation included lack of " insight. Social factors contributing to presentation included isolation, recent divorce, selling his house, and moving from hotel to hotel. Biological contributions to presentation included a history of hyperparathyroidism with chronic hypercalcemia per charts as well as a history of methamphetamine use per collateral from ex-wife.     History was difficult to obtain due to the patient's severe disorganized thinking on interview; he was observed with persecutory delusions pertaining to the realtor and gang members, disorganized behavior as these delusions have led him to flee to different hotels to stay safe/ has called  complaining of being targeted and auditory hallucinations of voices for the past 12 months. Per collateral from ex-wife, Santos has had paranoid ideations since they first met. He has always felt like people are out to get him or trying to rip him off. She says that he has had visual hallucinations (he will point things out that the rest of the family can't see) for a long time, but the family would just go along with him to avoid making him angry. This timeline and presentation could be consistent with diagnosis of paranoid personality disorder. Things began to worsen around the beginning of the COVID pandemic, when Santos became more isolated and the family started to notice cognitive issues such as impaired memory. Immediately prior to this hospitalization, the ex-wife found Santos in a hotel room with a generator full of gasoline, binoculars, and hunting equipment. This time course does not suggest acute psychosis, however given the patient has never had a full psychiatric work-up, we completed a first-episode psychosis work-up.      Other things that could be contributing to his presentation is hyperparathyroidism with hypercalcemia. However, patient's calcium returned to normal limits on 10/16, and patient continues to have psychosis so likely not a significant contributing factor to patient's  presentation.      Patient also continues to be disoriented and his behaviors appear to worsen in the evenings. This raised concern for underlying neurocognitive disorder with delirium. Patient received MRI brain with and without contrast which showed frontal and parietal atrophy greater than would be expected for patient's age. This is consistent with neurocognitive disorder. Patient has continued to improve with decreasing his antipsychotic. Working to get patient transferred to geriatric psychiatry in the short term and to memory care long term.      Given that he currently has psychosis, patient warrants inpatient psychiatric hospitalization to maintain his safety.     Psychiatric Plan by Diagnosis   Today's changes:  None  # Major Neurocognitive Disorder  # Rule out paranoid personality disorder  1. Medications:  - Olanzapine 5 mg at bedtime  - Neurology consult 11/8/24, recommend outpatient follow-up and neuropsychiatric testing     2. Pertinent Labs/Monitoring:   - See above     3. Additional Plans:  - Patient will be treated in therapeutic milieu with appropriate individual and group therapies as described     # Unspecified anxiety vs Generalized Anxiety Disorder  - Monitor for symptoms.  - Fluoxetine held due to suspicion of ongoing manic symptoms     Psychiatric Hospital Course:      Estevan Aaron was admitted to [facility] on a 72 hour hold.   Medications:  PTA fluoxetine was held due to concern for worsening of zelalem   New medications started at the time of admission include Zyprexa.   Increased olanzapine 10 mg at bedtime was to 10 mg BID (10/14)   Increased olanzapine 10 mg BID to 10 mg during day and 15 mg at bedtime (10/15)  10/21: increased olanzapine pm dose from 15 to 20 mg  10/23: started melatonin 3 mg at 7 pm to help patient with circadian rhythm as he has been staying up throughout the night and sleeping a lot during the day and there is some concern for delirium   10/24: consulted anesthesia  for MRI brain   10/28: MRI brain complete, decrease AM olanzapine from 10 to 5 mg  10/29: Morning olanzapine decreased to 2.5 mg, evening olanzapine decreased to 15 mg   11/1: Decreased evening olanzapine to 10 mg   11/4: Decreased evening olanzapine to 7.5 mg   11/5: Decreased evening olanzapine to 5 mg   11/11: Moved morning dose of olanzapine to the afternoon as patient appears more agitated in the afternoons/evenings  11/21: Started memantine 5 mg daily   11/26: Discontinued olanzapine 2.5 mg during the afternoon  12/2:   Discontinued memantine 5 mg, daily     Care conference 10/18/24 with daughter Maria C and ex-wife Clifford  - Report that patient has always been paranoid since ex-wife first met him. She describes him always feeling like he is getting ripped off.   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 They reported once incidence in which he made a bid on a job and the client paid him, but he never ended up finishing the job  - Wife and him  officially this year, and since selling the house several months ago, patient has been moving from hotel to hotel due to thinking people are out to get him   - When they were selling their house, patient threatened realtors and felt he was being ripped off   - Clifford reports that she started to be afraid to be around him alone  - When he was found in the hotel, he had a generator full of gasoline, binoculars, bullets, and hunting equipment.     10/21/24: updated daughter on Santos's treatment plan      10/23/24: updated daughter on Santos's  treatment plan      10/25/24: updated daughter on Santos's treatment plan, including plan to try and get MRI brain done under sedation. She said that Santos's grandfather had dementia and his sister has a brain tumor.      10/28/24: updated daughter on Santos's MRI results. She is planning on coming into town soon.      Care conference 11/1/24 with daughters Maria C and Estefani and ex-wife Clifford  - Discussed dementia diagnosis   - Clifford asked about plans moving forward. Explained that applying for medical assistance is the first step. Explained that application processing time can be very variable. Informed family that Santos will most likely be staying on this inpatient unit during this time.   - Clifford expressed that their family would like to be the power of /have conservatorship for Santos.   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 Reports that he has bills that he has not paid.   - Maria C is planning to move to [place], and Clifford currently lives in [place]. Family prefer that Santos would in a facility that are near these locations. Discussed with family that they can also try to request a specific location (memory care).   - Clifford reports that he had previously gotten help applying for his social security and medicare, but unsure if it had been approved.   - Informed family that there is a geriatric unit and there might be a transfer if there becomes availability on this unit.   - Family shared that he was completely different just two months ago.   - Estefani shared that his family found his medications in his old truck recently, expressed that it was likely that he was not taking his medications prior to his hospitalization.      11/6/24: updated Maria C on plan to try and transfer Santos to Geriatric unit.      11/11/24: updated Maria C on neurology consult      The risks, benefits, alternatives, and side effects were discussed and understood by the  patient and other caregivers.     Medical Assessment and Plan     Medical diagnoses to be addressed this admission:    # Hypertension  - Continue PTA medications  Furosemide 40 mg daily  Lisinopril 40 mg daily  Metoprolol 50 mg daily  Amlodipine 10 mg daily  Clonidine 0.1 mg BID     # Hyperlipidemia  - Continue PTA Atorvastatin 40 mg     # Hyperparathyroidism  # Hypercalcemia, hypophosphoremia   Increased Ca level to 10.9 and decreased Ph level to 2.3 in the ED. Suspicion patient's hypercalcemia could be contributing to symptoms of psychosis.  - Consulted endocrinology, who started cinacalcet 30 mg BID on 10/13  - 10/16 endocrinology recommended continuing to trend calcium and albumin to make sure patient does not become hypocalcemic and recommended holding cinacalcet   - 10/17, calcium and albumin wnl, endo recommended cinacalcet 30 mg once daily   - 10/21, per endo continue cincaclcet and recheck calcium and albumin on October 24  - 10/23: per endo, patient needs to follow up outpatient for further management of hyperparathyroidism      Medical course: Patient was physically examined by the ED prior to being transferred to the unit and was found to be medically stable and appropriate for admission.      Consults: Psychiatry, Endocrinology (follow-up of hyperthyroidism / hypercalcemia and hypertension), neurology     Checklist     Legal Status: Committed     Safety Assessment:   Behavioral Orders   Procedures    Assault precautions    Code 1 - Restrict to Unit    Routine Programming     As clinically indicated    Status 15     Every 15 minutes.    Status Individual Observation     1:1 during evening shift, & night shift.    If patient refuses overnight presence of staff in his room, it's ok to do 15 min checks through the window blinds.    Patient SIO status reviewed with team/RN.  Please also refer to RN/team documentation for add'l detail.    -SIO staff to monitor following which have contributed to patient being  on SIO:  Pt has psychosis regarding being followed and attacked, very paranoid, HI    -Possible interventions SIO staff could use to support patient's treatment progress:  Redirect and reassure  -When following observed, team will review discontinuation of SIO:  Psychosis improves     Order Specific Question:   CONTINUOUS 24 hours / day     Answer:   Other     Order Specific Question:   Specify distance     Answer:   10 feet, 5 feet when close to the doors     Order Specific Question:   Indications for SIO     Answer:   Assault risk     Order Specific Question:   Indications for SIO     Answer:   Severe intrusiveness       Risk Assessment:  Risk for harm is elevated.  Risk factors: impulsive and past behaviors  Protective factors: family      Disposition: Pending stabilization, medication optimization, & development of a safe discharge plan.     Attestations     Resident without student: This patient was seen and discussed with my attending physician.  [facility]  [facility] Resident Physician    Attestation:  This patient has been seen and evaluated by me, Pete Smith MD.  I have discussed this patient with the house staff team including the resident and/or medical student and I agree with the findings and plan in this note.    I have reviewed today's vital signs, medications, labs and imaging. Pete Smith MD , PhD.

## 2024-12-04 NOTE — PLAN OF CARE
BEH IP Unit Acuity Rating Score (UARS)  Patient is given one point for every criteria they meet.    CRITERIA SCORING   On a 72 hour hold, court hold, committed, stay of commitment, or revocation. 1    Patient LOS on BEH unit exceeds 20 days. 1  LOS: 53   Patient under guardianship, 55+, otherwise medically complex, or under age 11. 1   Suicide ideation without relief of precipitating factors. 0   Current plan for suicide. 0   Current plan for homicide. 0   Imminent risk or actual attempt to seriously harm another without relief of factors precipitating the attempt. 1   Severe dysfunction in daily living (ex: complete neglect for self care, extreme disruption in vegetative function, extreme deterioration in social interactions). 1   Recent (last 7 days) or current physical aggression in the ED or on unit. 0   Restraints or seclusion episode in past 72 hours. 0   Recent (last 7 days) or current verbal aggression, agitation, yelling, etc., while in the ED or unit. 1 - last 12/3   Active psychosis. 1   Need for constant or near constant redirection (from leaving, from others, etc).  0   Intrusive or disruptive behaviors. 1   Patient requires 3 or more hours of individualized nursing care per 8-hour shift (i.e. for ADLs, meds, therapeutic interventions). 1   TOTAL 9

## 2024-12-04 NOTE — PLAN OF CARE
"Pt encouraged to take a shower.  Pt declined.  Pt able to state day and year.  Pt asking to discharge.  Pt talking about a marina who saw him today and is he a surgeon.  Santos said this person told him he does not have a vote.  Pt often appears confused.  But pt eating well.  Pt has been out of his room most of the shift. Pt social with a few peers.   No bx issues this shift.      Problem: Adult Behavioral Health Plan of Care  Goal: Plan of Care Review  Outcome: Not Progressing  Goal: Patient-Specific Goal (Individualization)  Description: You can add care plan individualizations to a care plan. Examples of Individualization might be:  \"Parent requests to be called daily at 9am for status\", \"I have a hard time hearing out of my right ear\", or \"Do not touch me to wake me up as it startles  me\".  Outcome: Not Progressing  Goal: Adheres to Safety Considerations for Self and Others  Outcome: Not Progressing  Intervention: Develop and Maintain Individualized Safety Plan  Recent Flowsheet Documentation  Taken 12/4/2024 1100 by Catherine Szymanski RN  Safety Measures: safety rounds completed  Goal: Absence of New-Onset Illness or Injury  Outcome: Not Progressing  Intervention: Identify and Manage Fall Risk  Recent Flowsheet Documentation  Taken 12/4/2024 1100 by Catherine Szymanski RN  Safety Measures: safety rounds completed  Goal: Optimized Coping Skills in Response to Life Stressors  Outcome: Not Progressing  Goal: Develops/Participates in Therapeutic [place] to Support Successful Transition  Outcome: Not Progressing     Problem: Psychotic Signs/Symptoms  Goal: Improved Behavioral Control (Psychotic Signs/Symptoms)  Outcome: Not Progressing  Goal: Optimal Cognitive Function (Psychotic Signs/Symptoms)  Outcome: Not Progressing  Goal: Increased Participation and Engagement (Psychotic Signs/Symptoms)  Outcome: Not Progressing  Goal: Improved Mood Symptoms (Psychotic Signs/Symptoms)  Outcome: Not Progressing  Goal: Improved Psychomotor " Symptoms (Psychotic Signs/Symptoms)  Outcome: Not Progressing  Goal: Decreased Sensory Symptoms (Psychotic Signs/Symptoms)  Outcome: Not Progressing  Goal: Improved Sleep (Psychotic Signs/Symptoms)  Outcome: Not Progressing  Goal: Enhanced Social, Occupational or Functional Skills (Psychotic Signs/Symptoms)  Outcome: Not Progressing     Problem: Depression  Goal: Improved Mood  Outcome: Not Progressing     Problem: Suicidal Behavior  Goal: Suicidal Behavior is Absent or Managed  Outcome: Not Progressing     Problem: Anxiety  Goal: Anxiety Reduction or Resolution  Outcome: Not Progressing     Problem: Sleep Disturbance  Goal: Adequate Sleep/Rest  Outcome: Not Progressing     Problem: Seclusion/Restraint, Behavioral  Goal: Seclusion/Behavioral Restraint Goal: Absence of Harm or Injury  Outcome: Not Progressing  Intervention: Implement Least Restrictive Safety Strategies  Recent Flowsheet Documentation  Taken 12/4/2024 1100 by Catherine Szymanski RN  Safety Measures: safety rounds completed     Problem: Pain Acute  Goal: Optimal Pain Control and Function  Outcome: Not Progressing   Goal Outcome Evaluation:

## 2024-12-04 NOTE — PLAN OF CARE
Problem: Adult Behavioral Health Plan of Care  Goal: Adheres to Safety Considerations for Self and Others  Outcome: Progressing     Problem: Psychotic Signs/Symptoms  Goal: Improved Behavioral Control (Psychotic Signs/Symptoms)  Outcome: Progressing     Problem: Sleep Disturbance  Goal: Adequate Sleep/Rest  Outcome: Progressing   Goal Outcome Evaluation:  Patient is alert, and oriented, and able to communicated needs without difficulty. Patient continued to be on SIO 1:1 for intrusiveness, and assault risk. Denied pain, anxiety,depression, SI/SIB, and other mental illness symptoms. Patient slept for 5.75 hours this shift. No other concerns noted at this time, team will continue to monitor for needs.

## 2024-12-04 NOTE — CONSULTS
"[facility]  Consult Note - Hospitalist Service  Date of Admission:  10/12/2024  Consult Requested by: Psychiatry   Reason for Consult: \"Patient has a persistent low pulse and is on several blood pressure lowering medications, requesting recommendations for blood pressure management while patient. In addition, patient also has bilateral hand swelling\"    Assessment & Plan   Estevan Aaron is a 64 year old male with PMH significant for HTN, thalassemia, situational anxiety, HLD, hyperparathyroidism, hypercalcemia, nicotine use, admitted on 10/12/2024 to inpatient Psychiatry for psychosis. Medicine was consulted for bradycardia and and bilateral hand swelling.     Bradycardia, sinus; chronic   Appears patient has chronically had heart rate in 50-60s per chart review and review of clinic visits over the past year. Also appears that patient's heart rate has been 50-60s here over the past month. Notably, patient is on clonidine and metoprolol PTA for HTN. Patient is asymptomatic. ECG from 10/10 without significant block. Repeat ECG without concerning block as well and normal sinus rhythm, no bradycardia appreciated with HR of 66. Suspect that this is patient's baseline and in light of no block on ECG and no symptoms, would recommend continuing regimen as already ordered with indicated hold parameters. Would recommend clonidine also be held, as appears this has been given despite outside of parameters.   -Monitor  -Please continue clonidine and metoprolol with hold parameters   -Patient will need follow up with PCP as OP to continue to manage blood pressure as patient is on multiple agents (clonidine, metoprolol, lisinopril, furosemide, amlodipine)    Reported bilateral hand edema   Concern in consult placed by nursing, noted to be swelling in AM on 12/04. Patient denies any concern for swelling in bilateral hands and none appreciated on my exam. Notes his hands have not been " "edematous. No discoloration, changes in sensation, wounds, signs of trauma. ROM in tact. Bilateral upper extremities are warm and well perfused.   -Monitor      The patient's care was discussed with the Bedside Nurse, Patient, and Primary team via this note.    Medicine team will sign off at this time. Thank you for the consult. If further questions arise, please reach out to Medicine team.     Clinically Significant Risk Factors                   # Hypertension: Noted on problem list                # Financial/Environmental Concerns:           Grace Gilman PA-C  Hospitalist Service  Securely message with [employer] (more info)  Text page via [facility] Paging/Directory   ______________________________________________________________________    Chief Complaint   \"I feel fine\"    History is obtained from the patient    History of Present Illness   Estevan Aaron is a 64 year old male with PMH significant for HTN, thalassemia, situational anxiety, HLD, hyperparathyroidism, hypercalcemia, nicotine use, admitted on 10/12/2024 to inpatient Psychiatry for psychosis. Medicine was consulted for bradycardia and and bilateral hand swelling.     Feeling well. No concerns at all. No dizziness, lightheadedness, chest pain, dyspnea. No symptoms with movement or ambulation. When asked about hand swelling patient says \"that's a lie, my hands are normal, they are not swollen\". Denies concern for swelling in bilateral upper extremities. Notes his hands are unchanged from how they have always been. No changes in sensation. Using hands without difficulty. No trauma. No other concerns.       Past Medical History    Past Medical History:   Diagnosis Date    Kidney stone     Serum calcium elevated     Tobacco use disorder     tobacco quit line referral done 4/19/06       Past Surgical History   Past Surgical History:   Procedure Laterality Date    ANESTHESIA OUT OF OR MRI N/A 10/28/2024    Procedure: 1.5 MRI Brain;  Surgeon: GENERIC ANESTHESIA " PROVIDER;  Location: [facility] OR    ROTATOR CUFF REPAIR RT/LT Right        Medications   I have reviewed this patient's current medications       Review of Systems    The 10 point Review of Systems is negative other than noted in the HPI or here.     Social History   I have reviewed this patient's social history and updated it with pertinent information if needed.  Social History     Tobacco Use    Smoking status: Every Day     Current packs/day: 1.00     Average packs/day: 1 pack/day for 35.0 years (35.0 ttl pk-yrs)     Types: Cigarettes    Smokeless tobacco: Never   Vaping Use    Vaping status: Never Used   Substance Use Topics    Alcohol use: Yes     Alcohol/week: 0.0 standard drinks of alcohol    Drug use: No     Comment: no herbal meds either          Family History   I have reviewed this patient's family history and updated it with pertinent information if needed.  Family History   Problem Relation Age of Onset    Hyperlipidemia Father     Prostate Cancer Father          age 59 cancer prostate, liver    Hypertension Father     No Known Problems Sister     No Known Problems Sister     No Known Problems Sister     Cancer Maternal Grandmother          of cancer    Cerebrovascular Disease Maternal Grandfather     No Known Problems Daughter     No Known Problems Daughter     No Known Problems Daughter     Diabetes No family hx of     Coronary Artery Disease No family hx of     Breast Cancer No family hx of     Colon Cancer No family hx of          Allergies   No Known Allergies     Physical Exam   Vital Signs: Temp: 97  F (36.1  C) Temp src: Temporal BP: 113/75 Pulse: 58   Resp: 16 SpO2: 98 % O2 Device: None (Room air)    Weight: 241 lbs 9.6 oz  General: laying in bed, alert, cooperative, awake, in no acute distress  HEENT: normocephalic, atraumatic, anicteric sclera  Respiratory: breathing comfortably on room air, clear to auscultation bilaterally without wheezing, crackles, or rhonchi appreciated  Cardiac:  regular rate and rhythm with normal S1/S2 without murmurs, clicks, rubs or gallops, 2+ radial pulse on bilateral upper extremities, < 2 second capillary refill on bilateral upper extremities. No edema appreciated on bilateral upper extremities   Neuro: grossly non-focal, alert and oriented, normal speech, sensation in tact on bilateral upper extremities, hand  5/5 on bilateral upper extremities   MSK: no bony deformities, moving all extremities independently, ROM on bilateral upper extremities in tact.   Skin: no rashes or lesions on uncovered surfaces, no jaundice. No discoloration to bilateral upper extremities       Medical Decision Making             Data

## 2024-12-04 NOTE — PLAN OF CARE
Team Note Due:  Monday    Assessment/Intervention/Current Symtoms and Care Coordination:  Chart review and met with team, discussed pt progress, symptomology, and response to treatment.  Discussed the discharge plan and any potential impediments to discharge. Clifford Agnieszka is currently emergency guardian/conservator until 01/20/2025.    Discharge Plan or Goal:  Memory care facility     Barriers to Discharge:  Patient requires further psychiatric stabilization due to current symptomology, medication management with changes subject to provider, coordination with outside supports, and aftercare planning. Pt is under civil commitment.     Referral Status:    Memory Care facilities currently in process:  [facility]. Tour completed 11/27.  [facility]. Tour completed 11/29. Records sent 12/2/24.  Lorraine (): [email or web address]; [phone]  Isatu (director of nursing): [email or web address]  [facility] [place]. Tour completed 11/29.  [facility]. Tour completed 11/30.    Memory Care facilities pending family review:  [facility].   [facility].  [facility] [place].  [facility].  [facility].  [facility].  [facility].    Memory Care facilities declined:  [facility] (private pay only, no EW).  [facility] (private pay only, no EW).  [facility] (no openings).     Legal Status:  MI Commitment with [facility] [place]  File Number: [id number]  Start and expiration date of commitment: 10/24/24 - 04/24/25    [facility] meds: Haldol, Clozaril, Risperdal, Invega, Zyprexa, Seroquel, Abilify    PPS/CM:  Shelby Chowdary: [phone]  [email or web address].    Contacts:  Maria C Agnieszka (Daughter): [phone]   Clifford Aaron (ex-wife):  [phone]     No Del Angel (guardianship/conservatorship ): [phone]  [email or web address]     Upcoming Meetings and Dates/Important Information and next steps:  Follow up with family regarding list of Memory Care facilities  Discharge planning when appropriate  Pt does not qualify for MA per financial counselor on 11/8/2024. Pt will likely privately pay for Memory Care until he qualifies for MA/waivers in the future.  Pt will need to apply for MA before a M[place]hoices Assessment/SMRT can be completed for waivers.    Provisional Discharge and Change of Status needed at discharge

## 2024-12-04 NOTE — PLAN OF CARE
Problem: Adult Behavioral Health Plan of Care  Goal: Plan of Care Review  Outcome: Progressing     Problem: Psychotic Signs/Symptoms  Goal: Improved Behavioral Control (Psychotic Signs/Symptoms)  Outcome: Progressing     Problem: Anxiety  Goal: Anxiety Reduction or Resolution  Outcome: Progressing   Goal Outcome Evaluation:         Pt has been in and out of his room, seen on the phone couple times talking. Pt was watching television with peers but keeping to himself, affect remains flat and labile, he periodically comes to the desk with statements of wanting his car and go hunt. Pt dismissive with mental health assessment questions, no insight of his mental health status, denied physical pain, compliant with medications with prompts and encouragement. Food/intake adequate

## 2024-12-05 VITALS
BODY MASS INDEX: 39 KG/M2 | RESPIRATION RATE: 16 BRPM | SYSTOLIC BLOOD PRESSURE: 127 MMHG | HEART RATE: 60 BPM | TEMPERATURE: 98.2 F | DIASTOLIC BLOOD PRESSURE: 64 MMHG | OXYGEN SATURATION: 97 % | WEIGHT: 241.6 LBS

## 2024-12-05 LAB
ATRIAL RATE - MUSE: 66 BPM
DIASTOLIC BLOOD PRESSURE - MUSE: NORMAL MMHG
INTERPRETATION ECG - MUSE: NORMAL
P AXIS - MUSE: 50 DEGREES
PR INTERVAL - MUSE: 192 MS
QRS DURATION - MUSE: 110 MS
QT - MUSE: 390 MS
QTC - MUSE: 408 MS
R AXIS - MUSE: 1 DEGREES
SYSTOLIC BLOOD PRESSURE - MUSE: NORMAL MMHG
T AXIS - MUSE: 65 DEGREES
VENTRICULAR RATE- MUSE: 66 BPM

## 2024-12-05 PROCEDURE — 250N000013 HC RX MED GY IP 250 OP 250 PS 637

## 2024-12-05 PROCEDURE — 99231 SBSQ HOSP IP/OBS SF/LOW 25: CPT | Mod: GC | Performed by: PSYCHIATRY & NEUROLOGY

## 2024-12-05 PROCEDURE — 97150 GROUP THERAPEUTIC PROCEDURES: CPT | Mod: GO

## 2024-12-05 PROCEDURE — 124N000002 HC R&B MH UMMC

## 2024-12-05 RX ADMIN — LISINOPRIL 40 MG: 10 TABLET ORAL at 08:50

## 2024-12-05 RX ADMIN — METOPROLOL SUCCINATE 50 MG: 50 TABLET, EXTENDED RELEASE ORAL at 08:51

## 2024-12-05 RX ADMIN — Medication 3 MG: at 19:49

## 2024-12-05 RX ADMIN — OLANZAPINE 5 MG: 5 TABLET, ORALLY DISINTEGRATING ORAL at 19:49

## 2024-12-05 RX ADMIN — FUROSEMIDE 40 MG: 40 TABLET ORAL at 08:51

## 2024-12-05 RX ADMIN — CLONIDINE HYDROCHLORIDE 0.1 MG: 0.1 TABLET ORAL at 08:51

## 2024-12-05 RX ADMIN — AMLODIPINE BESYLATE 10 MG: 5 TABLET ORAL at 08:51

## 2024-12-05 RX ADMIN — Medication 1 PATCH: at 08:59

## 2024-12-05 RX ADMIN — CINACALCET 30 MG: 30 TABLET ORAL at 08:50

## 2024-12-05 RX ADMIN — ATORVASTATIN CALCIUM 40 MG: 20 TABLET, FILM COATED ORAL at 08:50

## 2024-12-05 RX ADMIN — CLONIDINE HYDROCHLORIDE 0.1 MG: 0.1 TABLET ORAL at 19:49

## 2024-12-05 ASSESSMENT — ACTIVITIES OF DAILY LIVING (ADL)
DRESS: INDEPENDENT
ADLS_ACUITY_SCORE: 54
ADLS_ACUITY_SCORE: 54
HYGIENE/GROOMING: HANDWASHING;INDEPENDENT
ADLS_ACUITY_SCORE: 54
ORAL_HYGIENE: INDEPENDENT
ORAL_HYGIENE: INDEPENDENT
ADLS_ACUITY_SCORE: 54
ADLS_ACUITY_SCORE: 54
DRESS: INDEPENDENT
ADLS_ACUITY_SCORE: 54
HYGIENE/GROOMING: INDEPENDENT
ADLS_ACUITY_SCORE: 54

## 2024-12-05 NOTE — PLAN OF CARE
Problem: Sleep Disturbance  Goal: Adequate Sleep/Rest  Outcome: Progressing     Pt appears to have slept for  hours.  He woke up intermittently and sat in the lounge with his SIO staff by his side.  Pt denies pain, anxiety, depression, and SI/SIB. No PRN medications were given. Pt is on 1:1 SIO for assault risk and severe intrusiveness. 15 minutes safety checks were in place. Staff will continue to offer support to pt.

## 2024-12-05 NOTE — PLAN OF CARE
Team Note Due:  Monday    Assessment/Intervention/Current Symtoms and Care Coordination:  Chart review and met with team, discussed pt progress, symptomology, and response to treatment.  Discussed the discharge plan and any potential impediments to discharge. Clifford Aaron is currently emergency guardian/conservator until 01/20/2025.    Sent follow up to Gia at [facility] requesting availability for a meeting.    Updated Clifford on attempts to contact [facility].     Discharge Plan or Goal:  Memory care facility     Barriers to Discharge:  Patient requires further psychiatric stabilization due to current symptomology, medication management with changes subject to provider, coordination with outside supports, and aftercare planning. Pt is under civil commitment.     Referral Status:    Memory Care facilities currently in process:  [facility]. Tour completed 11/27.  [facility]. Tour completed 11/29. Records sent 12/2/24.  Lorraine (): [email or web address]; [phone]  Isatu (director of nursing): [email or web address]  [facility] [place] [facility]. Tour completed 11/29.  [facility]. Tour completed 11/30.    Memory Care facilities pending family review:  [facility].   [facility].  [facility] [place].  [facility].  [facility].  [facility].  [facility].    Memory Care facilities declined:  [facility] (private pay only, no EW).  [facility] [address] (private pay only, no EW).  [facility] (no openings).     Legal Status:  MI Commitment with [facility]  File Number: [id number]  Start and expiration date of commitment: 10/24/24 - 04/24/25    [facility] meds: Haldol, Clozaril, Risperdal, Invega, Zyprexa, Seroquel,  Abilify    PPS/CM:  Shelby Chowdary: [phone]  [email or web address].    Contacts:  Maria C Aaron (Daughter): [phone]   Clifford Aaron (ex-wife): [phone]     No Bean (guardianship/conservatorship ): [phone]  [email or web address]     Upcoming Meetings and Dates/Important Information and next steps:  Follow up with family regarding list of Memory Care facilities  Discharge planning when appropriate  Pt does not qualify for MA per financial counselor on 11/8/2024. Pt will likely privately pay for Memory Care until he qualifies for MA/waivers in the future.  Pt will need to apply for MA before a MNChoices Assessment/SMRT can be completed for waivers.    Provisional Discharge and Change of Status needed at discharge

## 2024-12-05 NOTE — PLAN OF CARE
BEH IP Unit Acuity Rating Score (UARS)  Patient is given one point for every criteria they meet.    CRITERIA SCORING   On a 72 hour hold, court hold, committed, stay of commitment, or revocation. 1    Patient LOS on BEH unit exceeds 20 days. 1  LOS: 54   Patient under guardianship, 55+, otherwise medically complex, or under age 11. 1   Suicide ideation without relief of precipitating factors. 0   Current plan for suicide. 0   Current plan for homicide. 0   Imminent risk or actual attempt to seriously harm another without relief of factors precipitating the attempt. 1   Severe dysfunction in daily living (ex: complete neglect for self care, extreme disruption in vegetative function, extreme deterioration in social interactions). 1   Recent (last 7 days) or current physical aggression in the ED or on unit. 0   Restraints or seclusion episode in past 72 hours. 0   Recent (last 7 days) or current verbal aggression, agitation, yelling, etc., while in the ED or unit. 1 - last 12/3   Active psychosis. 1   Need for constant or near constant redirection (from leaving, from others, etc).  0   Intrusive or disruptive behaviors. 1   Patient requires 3 or more hours of individualized nursing care per 8-hour shift (i.e. for ADLs, meds, therapeutic interventions). 1   TOTAL 9

## 2024-12-05 NOTE — PROGRESS NOTES
Rehab Group    Start time: 1020  End time: 1200  Patient time total: 75 minutes    attended full group     #3 attended   Group Type: occupational therapy   Group Topic Covered: coping skills     Group Session Detail:  OT clinic     Patient Response/Contribution:  cooperative with task, attentive, and actively engaged     Patient Detail:    Pt actively participated in occupational therapy clinic to facilitate coping skill exploration, creative expression within personally meaningful activities, and clinical observation of social, cognitive, and kinesthetic performance skills. Pt response: Upon arrival to group, pt needed assistance to locate his in-progress project, though chose to leave group shortly after retrieving his project due to reported knee pain. He later returned to group and initially appeared somewhat scattered, as he was observed trying to open locked cabinets after retrieving his task materials, and didn't respond when writer asked what he was looking for. Upon settling into his creative expression task, he worked in a diligent and goal-directed manner for approximately x60 minutes, with good attention to task and attention to detail observed. Occasionally socialized with peers and staff, though spent a majority of group quietly focused on his task. At the end of group, he showed staff an image of his brain that he had taped onto a canvas to trace, and pointed out a few specific areas of his brain related to vision and hearing. Calm, pleasant, and engaged.      847[address] OT Group (2 or more in attendance)      Patient Active Problem List   Diagnosis    Hyperlipidemia LDL goal <130    Hypertension goal BP (blood pressure) < 130/80    Hypercalcemia    Hyperparathyroidism (H)    Thalassemia, unspecified type    Psychosis, unspecified psychosis type (H)    Acute psychosis (H)

## 2024-12-05 NOTE — PLAN OF CARE
Problem: Adult Behavioral Health Plan of Care  Goal: Absence of New-Onset Illness or Injury  Outcome: Progressing     Problem: Adult Behavioral Health Plan of Care  Goal: Adheres to Safety Considerations for Self and Others  Outcome: Progressing  Flowsheets (Taken 12/4/2024 2158)  Adheres to Safety Considerations for Self and Others: making progress toward outcome   Goal Outcome Evaluation:    Plan of Care Reviewed With: patient Plan of Care Reviewed With: patient    Overall Patient Progress: no changeOverall Patient Progress: no change     Patient was alert, able to communicate needs. He continues on SIO 1:1. He remains disorganized, illogical/confused, and mistrustful. He spent most of this shift in milieu watching tv, he was able to interact with peers and staff member. He was dismissive and refused to participate in MH assessment. He appeared calm, no signs of anxiety or agitation noted. He denied any pain, appeared in no distress. ADLs WNL, food and fluid intake WNL. Medication compliant.

## 2024-12-05 NOTE — PLAN OF CARE
"  Problem: Psychotic Signs/Symptoms  Goal: Improved Behavioral Control (Psychotic Signs/Symptoms)  Outcome: Progressing     Problem: Suicidal Behavior  Goal: Suicidal Behavior is Absent or Managed  Outcome: Progressing     Problem: Anxiety  Goal: Anxiety Reduction or Resolution  Outcome: Progressing     Problem: Sleep Disturbance  Goal: Adequate Sleep/Rest  Outcome: Progressing   Goal Outcome Evaluation:    Pt was out by the [facility] early. He snoozes off and on. He watches TV with peers. Interacts with them. Interactions were appropriate. Pt nutrition and hydration is adequate. He appears clean. Pt is medication compliant. He was able to join and participate in groups. Mid shift, pt approached this RN asking to \"do it, I am all cleared by the doctors.\" When this writer responded  that I need  the orders, pt said \"call them!\", Pt was irritable and appeared adamant on his request. He then went to his room. When he came back, pt has not repeated or inquired about  the request to lita PENG. MD still on the unit  was notified .Pt now snoozing in front of the TV by the lounge area.   "

## 2024-12-05 NOTE — PROGRESS NOTES
"  ----------------------------------------------------------------------------------------------------------  [facility]  Psychiatry Progress Note  Hospital Day #54     Interim History:     The patient's care was discussed with the treatment team and chart notes were reviewed.    Vitals: VSS  Sleep: 4.75 hours (12/05/24 0600)  Scheduled medications: Took all scheduled medications as prescribed  Psychiatric PRN medications:   None  Staff Report:   -He remains disorganized, illogical/confused, and mistrustful   -He denied any pain, appeared in no distress   -Pt asking to discharge.     - Please see staff notes for details.      Subjective:     Patient Interview:  Pt approached for interview in the group room as he was sitting down at a table, coloring. Pt is confused and asks team if he can drive. Pt is wondering if he is \"free to go.\" When clarified that pt is committed, pt says \"oh, that's right.\" Pt asks again if he is able to drive. Pt pulls out MRI brain scan, and asks team what is wrong with his brain.     Team explains what the MRI shows, pt asks clarifying questions and seems to understand. However, pt again repeats question about if he can drive. Says \"tell my daughter Maria C to get off my back,\" as daughter told him he cannot drive. Asks whether the team can \"clear him.\"     ROS:  See above     Objective:     Vitals:  BP (!) 144/78   Pulse 64   Temp 97.3  F (36.3  C) (Oral)   Resp 16   Wt 109.6 kg (241 lb 9.6 oz)   SpO2 100%   BMI 39.00 kg/m      Allergies:  No Known Allergies    Current Medications:  Scheduled:  Current Facility-Administered Medications   Medication Dose Route Frequency Provider Last Rate Last Admin    acetaminophen (TYLENOL) tablet 650 mg  650 mg Oral Q4H PRN Nikki Caceres MD   650 mg at 11/30/24 1133    alum & mag hydroxide-simethicone (MAALOX) suspension 30 mL  30 mL Oral Q4H PRN Nikki Caceres MD   30 mL at 10/17/24 0837    " amLODIPine (NORVASC) tablet 10 mg  10 mg Oral Daily Presley Barrera MD   10 mg at 12/04/24 0925    atorvastatin (LIPITOR) tablet 40 mg  40 mg Oral Daily Nikki Caceres MD   40 mg at 12/04/24 0924    cholecalciferol (VITAMIN D3) capsule 1,250 mcg  1,250 mcg Oral Q7 Days Evelia Grimm DO   1,250 mcg at 12/03/24 0943    cinacalcet (SENSIPAR) tablet 30 mg  30 mg Oral Daily Presley Barrera MD   30 mg at 12/04/24 0924    cloNIDine (CATAPRES) tablet 0.1 mg  0.1 mg Oral BID Presley Barrera MD   0.1 mg at 12/04/24 2025    furosemide (LASIX) tablet 40 mg  40 mg Oral Daily Presley Barrera MD   40 mg at 12/04/24 0925    gabapentin (NEURONTIN) capsule 100 mg  100 mg Oral Q6H PRN Nikki Caceres MD   100 mg at 11/15/24 0418    hydrocortisone (CORTAID) 0.5 % cream   Topical Daily PRN Savi Chamorro MD        lisinopril (ZESTRIL) tablet 40 mg  40 mg Oral Daily Presley Barrera MD   40 mg at 12/04/24 0924    melatonin tablet 3 mg  3 mg Oral At Bedtime Presley Barrera MD   3 mg at 12/04/24 2025    metoprolol succinate ER (TOPROL XL) 24 hr tablet 50 mg  50 mg Oral Daily Presley Barrera MD   50 mg at 12/02/24 0813    nicotine (NICODERM CQ) 14 MG/24HR 24 hr patch 1 patch  1 patch Transdermal Daily Evelia Grimm DO   1 patch at 12/04/24 0928    nicotine (NICODERM CQ) 21 MG/24HR 24 hr patch 1 patch  1 patch Transdermal Daily PRN Britt Kang MD   1 patch at 10/13/24 1611    nicotine (NICORETTE) gum 2 mg  2 mg Buccal Q1H PRN González Garcia MD   2 mg at 10/24/24 1254    OLANZapine zydis (zyPREXA) ODT tab 5 mg  5 mg Oral At Bedtime González Garcia MD   5 mg at 12/04/24 2025    Or    OLANZapine (zyPREXA) injection 10 mg  10 mg Intramuscular At Bedtime González Garcia MD        OLANZapine (zyPREXA) tablet 5 mg  5 mg Oral TID PRN Evelia Grimm DO   5 mg at 11/24/24 0436    Or    OLANZapine (zyPREXA) injection 5 mg  5 mg Intramuscular TID PRN Evelia Grimm DO        polyethylene glycol (MIRALAX) Packet 17 g  17 g Oral Daily  PRN Nikki Caceres MD        traZODone (DESYREL) half-tab 25 mg  25 mg Oral At Bedtime PRN Evelia Grimm DO   25 mg at 11/29/24 0148    Or    traZODone (DESYREL) tablet 50 mg  50 mg Oral At Bedtime PRN Evelia Grimm DO           PRN:  Current Facility-Administered Medications   Medication Dose Route Frequency Provider Last Rate Last Admin    acetaminophen (TYLENOL) tablet 650 mg  650 mg Oral Q4H PRN Nikki Caceres MD   650 mg at 11/30/24 1133    alum & mag hydroxide-simethicone (MAALOX) suspension 30 mL  30 mL Oral Q4H PRN Nikki Caceres MD   30 mL at 10/17/24 0837    amLODIPine (NORVASC) tablet 10 mg  10 mg Oral Daily Presley Barrera MD   10 mg at 12/04/24 0925    atorvastatin (LIPITOR) tablet 40 mg  40 mg Oral Daily Nikki Caceres MD   40 mg at 12/04/24 0924    cholecalciferol (VITAMIN D3) capsule 1,250 mcg  1,250 mcg Oral Q7 Days Evelia Grimm DO   1,250 mcg at 12/03/24 0943    cinacalcet (SENSIPAR) tablet 30 mg  30 mg Oral Daily Presley Barrera MD   30 mg at 12/04/24 0924    cloNIDine (CATAPRES) tablet 0.1 mg  0.1 mg Oral BID Presley Barrera MD   0.1 mg at 12/04/24 2025    furosemide (LASIX) tablet 40 mg  40 mg Oral Daily Presley Barrear MD   40 mg at 12/04/24 0925    gabapentin (NEURONTIN) capsule 100 mg  100 mg Oral Q6H PRN Nikki Caceres MD   100 mg at 11/15/24 0418    hydrocortisone (CORTAID) 0.5 % cream   Topical Daily PRN Savi Chamorro MD        lisinopril (ZESTRIL) tablet 40 mg  40 mg Oral Daily Presley Barrera MD   40 mg at 12/04/24 0924    melatonin tablet 3 mg  3 mg Oral At Bedtime Presley Barrera MD   3 mg at 12/04/24 2025    metoprolol succinate ER (TOPROL XL) 24 hr tablet 50 mg  50 mg Oral Daily Presley Barrera MD   50 mg at 12/02/24 0813    nicotine (NICODERM CQ) 14 MG/24HR 24 hr patch 1 patch  1 patch Transdermal Daily Evelia Grimm,    1 patch at 12/04/24 0928    nicotine (NICODERM CQ) 21 MG/24HR 24 hr patch 1 patch  1 patch Transdermal Daily PRN Britt Kang MD   1  patch at 10/13/24 1611    nicotine (NICORETTE) gum 2 mg  2 mg Buccal Q1H PRN González Garcia MD   2 mg at 10/24/24 1254    OLANZapine zydis (zyPREXA) ODT tab 5 mg  5 mg Oral At Bedtime González Garcia MD   5 mg at 12/04/24 2025    Or    OLANZapine (zyPREXA) injection 10 mg  10 mg Intramuscular At Bedtime González Garcia MD        OLANZapine (zyPREXA) tablet 5 mg  5 mg Oral TID PRN Evelia Grimm DO   5 mg at 11/24/24 0436    Or    OLANZapine (zyPREXA) injection 5 mg  5 mg Intramuscular TID PRN Evelia Grimm DO        polyethylene glycol (MIRALAX) Packet 17 g  17 g Oral Daily PRN Nikki Caceres MD        traZODone (DESYREL) half-tab 25 mg  25 mg Oral At Bedtime PRN Evelia Grimm,    25 mg at 11/29/24 0148    Or    traZODone (DESYREL) tablet 50 mg  50 mg Oral At Bedtime PRN Evelia Grimm DO         Labs and Imaging:  Data this admission:  - CBC unremarkable  - CMP unremarkable  - TSH normal  - UDS negative  - Vit D low  - Hgb A1c 5.9 (10/13/24)  - Lipids unremarkable  - Vit B12 normal  - Folate normal  - Urinalysis unremarkable  - EKG normal sinus rhythm, QTc 390 ms  - Head CT showed no acute changes  - HIV non reactive  - Treponema antibody non reactive  - ESR wnl   - Ceruloplasmin wnl   - BOOGIE negative  - Lyme negative      Parathyroid hormone:   10/13: 85     Calcium:  10/14: 11.4  10/16: 10.3  10/17: 10.3  10/19: 9.8  10/21: 10.6  10/23: 10.3     Albumin:  10/14: 4.3  10/16: 4.3  10/17: 4.1  10/19: 4.2  10/21: 4.5  10/23: 4.3      CXR:   Impression:   1. No definite radiographic evidence of asbestosis. If clinically  indicated, consider evaluation with high resolution chest CT.  2. Nonspecific left costophrenic blunting. Given symmetrically low  lung volumes may be related to poor inspiratory effort/timing.  3. Pulmonary vascular congestion.     MRI:   IMPRESSION:  1. No acute intracranial pathology.  2. No focal lesion or structural abnormality.   3. Symmetric frontoparietal cortical volume loss slightly  "more than  expected for age.     Mental Status Exam:   Oriented to: Oriented to self but not time and place  General:  Awake and Alert  Appearance:  appears stated age, Grooming is adequate, and Dressed in his own clothes  Behavior/Attitude:  Calm and Easily distracted  Eye Contact: Appropriate for conversation  Psychomotor: No evidence of tics, dystonia, or tardive dyskinesia  no catatonia present  Speech:  appropriate volume/tone  Language: Fluent in English with appropriate syntax and vocabulary.  Mood:  \"can you clear me to drive\"  Affect:  confused  Thought Process:  disorganized and confused  Thought Content:   did not assess SI/HI/ delusion of confusion . Patient denies paranoia  Associations:  loose  Insight:  limited   Judgment:  limited   Impulse control: limited  Attention Span:   decreased  Concentration:   distractible  Recent and Remote Memory:   remote memory intact, recent memory impaired  Fund of Knowledge: average  Muscle Strength and Tone: normal  Gait and Station: Normal     Psychiatric Assessment     Estevan Aaron is a 64 year old male with previous psychiatric diagnoses of GEORGINA admitted from the ER on 10/12/2024 due to concern for HI and psychosis in the context of medical issues (hyperthyroidism, hypercalcemia), psychosocial stressors including with recent divorce and selling his home. This is the patient's first psychiatric hospitalization. Significant symptoms on admission included delusions of persecution with grandiose beliefs, homicidal ideations, as well as disorganized thinking and behavior. The MSE on admission was pertinent for a thought process which was perseverative, circumstantial, tangential, disorganized and tangential; with rambling and looseness of associations. Psychological contributions to presentation included lack of insight. Social factors contributing to presentation included isolation, recent divorce, selling his house, and moving from hotel to hotel. Biological " contributions to presentation included a history of hyperparathyroidism with chronic hypercalcemia per charts as well as a history of methamphetamine use per collateral from ex-wife.     History was difficult to obtain due to the patient's severe disorganized thinking on interview; he was observed with persecutory delusions pertaining to the realtor and gang members, disorganized behavior as these delusions have led him to flee to different hotels to stay safe/ has called  complaining of being targeted and auditory hallucinations of voices for the past 12 months. Per collateral from ex-wife, Santos has had paranoid ideations since they first met. He has always felt like people are out to get him or trying to rip him off. She says that he has had visual hallucinations (he will point things out that the rest of the family can't see) for a long time, but the family would just go along with him to avoid making him angry. This timeline and presentation could be consistent with diagnosis of paranoid personality disorder. Things began to worsen around the beginning of the COVID pandemic, when Santos became more isolated and the family started to notice cognitive issues such as impaired memory. Immediately prior to this hospitalization, the ex-wife found Santos in a hotel room with a generator full of gasoline, binoculars, and hunting equipment. This time course does not suggest acute psychosis, however given the patient has never had a full psychiatric work-up, we completed a first-episode psychosis work-up.      Other things that could be contributing to his presentation is hyperparathyroidism with hypercalcemia. However, patient's calcium returned to normal limits on 10/16, and patient continues to have psychosis so likely not a significant contributing factor to patient's presentation.      Patient also continues to be disoriented and his behaviors appear to worsen in the evenings. This raised concern for underlying  neurocognitive disorder with delirium. Patient received MRI brain with and without contrast which showed frontal and parietal atrophy greater than would be expected for patient's age. This is consistent with neurocognitive disorder. Patient has continued to improve with decreasing his antipsychotic. Working to get patient transferred to geriatric psychiatry in the short term and to memory care long term.      Given that he currently has psychosis, patient warrants inpatient psychiatric hospitalization to maintain his safety.     Psychiatric Plan by Diagnosis   Today's changes:  None    # Major Neurocognitive Disorder  # Rule out paranoid personality disorder  1. Medications:  - Olanzapine 5 mg at bedtime  - Neurology consult 11/8/24, recommend outpatient follow-up and neuropsychiatric testing     2. Pertinent Labs/Monitoring:   - See above     3. Additional Plans:  - Patient will be treated in therapeutic milieu with appropriate individual and group therapies as described     # Unspecified anxiety vs Generalized Anxiety Disorder  - Monitor for symptoms.  - Fluoxetine held due to suspicion of ongoing manic symptoms     Psychiatric Hospital Course:      Estevan Aaron was admitted to [facility] on a 72 hour hold.   Medications:  PTA fluoxetine was held due to concern for worsening of zelalem   New medications started at the time of admission include Zyprexa.   Increased olanzapine 10 mg at bedtime was to 10 mg BID (10/14)   Increased olanzapine 10 mg BID to 10 mg during day and 15 mg at bedtime (10/15)  10/21: increased olanzapine pm dose from 15 to 20 mg  10/23: started melatonin 3 mg at 7 pm to help patient with circadian rhythm as he has been staying up throughout the night and sleeping a lot during the day and there is some concern for delirium   10/24: consulted anesthesia for MRI brain   10/28: MRI brain complete, decrease AM olanzapine from 10 to 5 mg  10/29: Morning olanzapine decreased to 2.5 mg, evening  olanzapine decreased to 15 mg   11/1: Decreased evening olanzapine to 10 mg   11/4: Decreased evening olanzapine to 7.5 mg   11/5: Decreased evening olanzapine to 5 mg   11/11: Moved morning dose of olanzapine to the afternoon as patient appears more agitated in the afternoons/evenings  11/21: Started memantine 5 mg daily   11/26: Discontinued olanzapine 2.5 mg during the afternoon  12/2:   Discontinued memantine 5 mg, daily     Care conference 10/18/24 with daughter Maria C and ex-wife Clifford  - Report that patient has always been paranoid since ex-wife first met him. She describes him always feeling like he is getting ripped off.   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 They reported once incidence in which he made a bid on a job and the client paid him, but he never ended up finishing the job  - Wife and him  officially this year, and since selling the house several months ago, patient has been moving from hotel to hotel due to thinking people are out to get him   - When they were selling their house, patient threatened realtors and felt he was being ripped off   - Clifford reports that she started to be afraid to be around him alone  - When he was found in the hotel, he had a generator full of gasoline, binoculars, bullets, and hunting equipment.     10/21/24: updated daughter on Santos's treatment plan      10/23/24: updated daughter on Santos's treatment plan      10/25/24: updated daughter on Santos's treatment plan, including plan to try and get MRI brain done under sedation. She  said that Santos's grandfather had dementia and his sister has a brain tumor.      10/28/24: updated daughter on Santos's MRI results. She is planning on coming into town soon.      Care conference 11/1/24 with daughters Maria C and Estefani and ex-wife Clifford  - Discussed dementia diagnosis   - Clifford asked about plans moving forward. Explained that applying for medical assistance is the first step. Explained that application processing time can be very variable. Informed family that Santos will most likely be staying on this inpatient unit during this time.   - Clifford expressed that their family would like to be the power of /have conservatorship for Santos.   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 Reports that he has bills that he has not paid.   - Maria C is planning to move to [place], and Clifford currently lives in [place]. Family prefer that Santos would in a facility that are near these locations. Discussed with family that they can also try to request a specific location (memory care).   - Clifford reports that he had previously gotten help applying for his social security and medicare, but unsure if it had been approved.   - Informed family that there is a geriatric unit and there might be a transfer if there becomes availability on this unit.   - Family shared that he was completely different just two months ago.   - Estefani shared that his family found his medications in his old truck recently, expressed that it was likely that he was not taking his medications prior to his hospitalization.      11/6/24: updated Maria C on plan to try and transfer Santos to Geriatric unit.      11/11/24: updated Maria C on neurology consult      The risks, benefits, alternatives, and side effects were discussed and understood by the patient and other caregivers.     Medical Assessment and Plan     Medical diagnoses to be addressed this admission:    # Hypertension  -  Continue PTA medications  Furosemide 40 mg daily  Lisinopril 40 mg daily  Metoprolol 50 mg daily  Amlodipine 10 mg daily  Clonidine 0.1 mg BID     # Hyperlipidemia  - Continue PTA Atorvastatin 40 mg     # Hyperparathyroidism  # Hypercalcemia, hypophosphoremia   Increased Ca level to 10.9 and decreased Ph level to 2.3 in the ED. Suspicion patient's hypercalcemia could be contributing to symptoms of psychosis.  - Consulted endocrinology, who started cinacalcet 30 mg BID on 10/13  - 10/16 endocrinology recommended continuing to trend calcium and albumin to make sure patient does not become hypocalcemic and recommended holding cinacalcet   - 10/17, calcium and albumin wnl, endo recommended cinacalcet 30 mg once daily   - 10/21, per endo continue cincaclcet and recheck calcium and albumin on October 24  - 10/23: per endo, patient needs to follow up outpatient for further management of hyperparathyroidism      Medical course: Patient was physically examined by the ED prior to being transferred to the unit and was found to be medically stable and appropriate for admission.      Consults: Psychiatry, Endocrinology (follow-up of hyperthyroidism / hypercalcemia and hypertension), neurology     Checklist     Legal Status: Committed     Safety Assessment:   Behavioral Orders   Procedures    Assault precautions    Code 1 - Restrict to Unit    Routine Programming     As clinically indicated    Status 15     Every 15 minutes.    Status Individual Observation     1:1 during evening shift, & night shift.    If patient refuses overnight presence of staff in his room, it's ok to do 15 min checks through the window blinds.    Patient SIO status reviewed with team/RN.  Please also refer to RN/team documentation for add'l detail.    -SIO staff to monitor following which have contributed to patient being on SIO:  Pt has psychosis regarding being followed and attacked, very paranoid, HI    -Possible interventions SIO staff could use to  support patient's treatment progress:  Redirect and reassure  -When following observed, team will review discontinuation of SIO:  Psychosis improves     Order Specific Question:   CONTINUOUS 24 hours / day     Answer:   Other     Order Specific Question:   Specify distance     Answer:   10 feet, 5 feet when close to the doors     Order Specific Question:   Indications for SIO     Answer:   Assault risk     Order Specific Question:   Indications for SIO     Answer:   Severe intrusiveness       Risk Assessment:  Risk for harm is elevated.  Risk factors: impulsive and past behaviors  Protective factors: family      Disposition: Pending stabilization, medication optimization, & development of a safe discharge plan.     Attestations     Resident without student: This patient was seen and discussed with my attending physician.  [facility]  [facility]iat[facility] Resident Physician      Attestation:  This patient has been seen and evaluated by me, Pete Smith MD.  I have discussed this patient with the house staff team including the resident and/or medical student and I agree with the findings and plan in this note.    I have reviewed today's vital signs, medications, labs and imaging. Pete Smith MD , PhD.

## 2024-12-06 PROCEDURE — 99231 SBSQ HOSP IP/OBS SF/LOW 25: CPT | Mod: GC | Performed by: PSYCHIATRY & NEUROLOGY

## 2024-12-06 PROCEDURE — 250N000013 HC RX MED GY IP 250 OP 250 PS 637

## 2024-12-06 PROCEDURE — 124N000002 HC R&B MH UMMC

## 2024-12-06 RX ADMIN — AMLODIPINE BESYLATE 10 MG: 5 TABLET ORAL at 08:32

## 2024-12-06 RX ADMIN — ATORVASTATIN CALCIUM 40 MG: 20 TABLET, FILM COATED ORAL at 08:32

## 2024-12-06 RX ADMIN — CLONIDINE HYDROCHLORIDE 0.1 MG: 0.1 TABLET ORAL at 08:32

## 2024-12-06 RX ADMIN — CINACALCET 30 MG: 30 TABLET ORAL at 08:32

## 2024-12-06 RX ADMIN — Medication 1 PATCH: at 08:38

## 2024-12-06 RX ADMIN — FUROSEMIDE 40 MG: 40 TABLET ORAL at 08:32

## 2024-12-06 RX ADMIN — METOPROLOL SUCCINATE 50 MG: 50 TABLET, EXTENDED RELEASE ORAL at 08:32

## 2024-12-06 RX ADMIN — LISINOPRIL 40 MG: 10 TABLET ORAL at 08:32

## 2024-12-06 RX ADMIN — CLONIDINE HYDROCHLORIDE 0.1 MG: 0.1 TABLET ORAL at 20:57

## 2024-12-06 RX ADMIN — Medication 3 MG: at 20:57

## 2024-12-06 RX ADMIN — OLANZAPINE 5 MG: 5 TABLET, ORALLY DISINTEGRATING ORAL at 20:57

## 2024-12-06 ASSESSMENT — ACTIVITIES OF DAILY LIVING (ADL)
ADLS_ACUITY_SCORE: 54
ADLS_ACUITY_SCORE: 64
DRESS: INDEPENDENT;STREET CLOTHES
ADLS_ACUITY_SCORE: 54
ADLS_ACUITY_SCORE: 64
ADLS_ACUITY_SCORE: 54
ORAL_HYGIENE: INDEPENDENT
ADLS_ACUITY_SCORE: 54
ADLS_ACUITY_SCORE: 64
ADLS_ACUITY_SCORE: 64
ADLS_ACUITY_SCORE: 54
LAUNDRY: UNABLE TO COMPLETE
ADLS_ACUITY_SCORE: 54
HYGIENE/GROOMING: INDEPENDENT;PROMPTS
LAUNDRY: UNABLE TO COMPLETE
ADLS_ACUITY_SCORE: 64
ADLS_ACUITY_SCORE: 54
DRESS: INDEPENDENT;STREET CLOTHES
ORAL_HYGIENE: PROMPTS;INDEPENDENT
ADLS_ACUITY_SCORE: 54
ADLS_ACUITY_SCORE: 64
HYGIENE/GROOMING: PROMPTS;INDEPENDENT
ADLS_ACUITY_SCORE: 64

## 2024-12-06 NOTE — PLAN OF CARE
BEH IP Unit Acuity Rating Score (UARS)  Patient is given one point for every criteria they meet.    CRITERIA SCORING   On a 72 hour hold, court hold, committed, stay of commitment, or revocation. 1    Patient LOS on BEH unit exceeds 20 days. 1  LOS: 55   Patient under guardianship, 55+, otherwise medically complex, or under age 11. 1   Suicide ideation without relief of precipitating factors. 0   Current plan for suicide. 0   Current plan for homicide. 0   Imminent risk or actual attempt to seriously harm another without relief of factors precipitating the attempt. 1   Severe dysfunction in daily living (ex: complete neglect for self care, extreme disruption in vegetative function, extreme deterioration in social interactions). 1   Recent (last 7 days) or current physical aggression in the ED or on unit. 0   Restraints or seclusion episode in past 72 hours. 0   Recent (last 7 days) or current verbal aggression, agitation, yelling, etc., while in the ED or unit. 1 - last 12/3   Active psychosis. 1   Need for constant or near constant redirection (from leaving, from others, etc).  0   Intrusive or disruptive behaviors. 1   Patient requires 3 or more hours of individualized nursing care per 8-hour shift (i.e. for ADLs, meds, therapeutic interventions). 1   TOTAL 9        Patient presents to liver disease clinic with Dr. Mendoza for follow up for elevated LFT's. Medications and allergies reviewed via [employer]. Assessment deferred to MD. Per Dr. Mendoza - needs start vitamin E 400 units BID, vitamin D 2000 units daily, start multivitamin, follow up in 6 months with labs and fibroscan done prior. Plan reviewed with patient who verbalized understanding. Orders entered.

## 2024-12-06 NOTE — PLAN OF CARE
Team Note Due:  Monday    Assessment/Intervention/Current Symtoms and Care Coordination:  Chart review and met with team, discussed pt progress, symptomology, and response to treatment.  Discussed the discharge plan and any potential impediments to discharge. Clifford Agnieszka is currently emergency guardian/conservator until 01/20/2025.    Discharge Plan or Goal:  Memory care facility     Barriers to Discharge:  Patient requires further psychiatric stabilization due to current symptomology, medication management with changes subject to provider, coordination with outside supports, and aftercare planning. Pt is under civil commitment.     Referral Status:    Memory Care facilities currently in process:  [facility]. Tour completed 11/27.  [facility]. Tour completed 11/29. Records sent 12/2/24.  Lorraine (): [email or web address]; [phone]  Isatu (director of nursing): [email or web address]  [facility]. Tour completed 11/29.  [facility]. Tour completed 11/30.    Memory Care facilities pending family review:  [facility].   [facility].  [facility].  [facility].  [facility].  [facility].  [facility]    Memory Care facilities declined:  [facility] (private pay only, no EW).  [facility] (private pay only, no EW).  [facility] (no openings).     Legal Status:  MI Commitment with [facility]: [place]  File Number: [id number]  Start and expiration date of commitment: 10/24/24 - 04/24/25    [facility] meds: Haldol, Clozaril, Risperdal, Invega, Zyprexa, Seroquel, Abilify    PPS/CM:  Shelby Chowdary: [phone]  [email or web address].    Contacts:  Maria C Agnieszka (Daughter): [phone]   Clifford Aaron (ex-wife):  [phone]     No Del Angel (guardianship/conservatorship ): [phone]  [email or web address]     Upcoming Meetings and Dates/Important Information and next steps:  Follow up with family regarding list of Memory Care facilities  Discharge planning when appropriate  Pt does not qualify for MA per financial counselor on 11/8/2024. Pt will likely privately pay for Memory Care until he qualifies for MA/waivers in the future.  Pt will need to apply for MA before a [employer] Assessment/SMRT can be completed for waivers.    Provisional Discharge and Change of Status needed at discharge

## 2024-12-06 NOTE — PLAN OF CARE
Problem: Adult Behavioral Health Plan of Care  Goal: Adheres to Safety Considerations for Self and Others  Outcome: Progressing  Flowsheets (Taken 12/5/2024 1802)  Adheres to Safety Considerations for Self and Others: making progress toward outcome     Problem: Depression  Goal: Improved Mood  Outcome: Progressing     Problem: Suicidal Behavior  Goal: Suicidal Behavior is Absent or Managed  Outcome: Progressing   Goal Outcome Evaluation:    Plan of Care Reviewed With: patient Plan of Care Reviewed With: patient    Overall Patient Progress: no changeOverall Patient Progress: no change     Alert, able to communicate needs. Remained confused, increased confusion around 1800, redirectable. Continues to be disorganized and circumstantial. He was visible in milieu, he mostly watched tv, social and interactive with peers. Presented as labile but mostly cooperative and medication compliant. He denied anxiety or depression, denied SI/HI. Mistrustful at times.  Patient denied any pain or discomfort. ADLs WNL.

## 2024-12-06 NOTE — PLAN OF CARE
"  Problem: Adult Behavioral Health Plan of Care  Goal: Plan of Care Review  Outcome: Progressing     Problem: Psychotic Signs/Symptoms  Goal: Improved Behavioral Control (Psychotic Signs/Symptoms)  Outcome: Progressing     Problem: Anxiety  Goal: Anxiety Reduction or Resolution  Outcome: Progressing   Goal Outcome Evaluation:    Pt very agitated, came to the desk couple times requesting for invoice and some additional documents supporting it, pt was redirected by SIO staff and other staff but unable to. Speech tangential with irrelevant information. Pt started posturing on one specific staff stating \"I will choke you\", DARREN team was called, pt offered to take PRN Zyprexa refused and trying to throw it away from the CN. Pt later was talked to by the DARREN team, he was calm and redirectable throughout the shift. Pt dismissive with MH assessment, no insight of his mental status. Pt took his HS medication with no issues or prompts, adequate food/fluid intake                    "

## 2024-12-06 NOTE — PLAN OF CARE
"Pt was awake when day shift started. Pt insistent on getting his wallet, ID, and hunting licence.  Explained to him that his family have it.  Pt continuing to state he wants it and for staff to fix the situation.  Pt difficult to redirect.  When breakfast cart was her pt looking and heading to the front doors. Pt was redirected.  Pt appears to need a shower. PT states he not going to take one and that he doesn't stink.   After breakfast pt was nodding off in a chair in the lounge.    Problem: Adult Behavioral Health Plan of Care  Goal: Plan of Care Review  Outcome: Not Progressing  Goal: Patient-Specific Goal (Individualization)  Description: You can add care plan individualizations to a care plan. Examples of Individualization might be:  \"Parent requests to be called daily at 9am for status\", \"I have a hard time hearing out of my right ear\", or \"Do not touch me to wake me up as it startles  me\".  Outcome: Not Progressing  Goal: Adheres to Safety Considerations for Self and Others  Outcome: Not Progressing  Goal: Absence of New-Onset Illness or Injury  Outcome: Not Progressing  Goal: Optimized Coping Skills in Response to Life Stressors  Outcome: Not Progressing  Goal: Develops/Participates in Therapeutic [place] to Support Successful Transition  Outcome: Not Progressing     Problem: Psychotic Signs/Symptoms  Goal: Improved Behavioral Control (Psychotic Signs/Symptoms)  Outcome: Not Progressing  Goal: Optimal Cognitive Function (Psychotic Signs/Symptoms)  Outcome: Not Progressing  Goal: Increased Participation and Engagement (Psychotic Signs/Symptoms)  Outcome: Not Progressing  Goal: Improved Mood Symptoms (Psychotic Signs/Symptoms)  Outcome: Not Progressing  Goal: Improved Psychomotor Symptoms (Psychotic Signs/Symptoms)  Outcome: Not Progressing  Goal: Decreased Sensory Symptoms (Psychotic Signs/Symptoms)  Outcome: Not Progressing  Goal: Improved Sleep (Psychotic Signs/Symptoms)  Outcome: Not Progressing  Goal: " Enhanced Social, Occupational or Functional Skills (Psychotic Signs/Symptoms)  Outcome: Not Progressing     Problem: Depression  Goal: Improved Mood  Outcome: Not Progressing     Problem: Suicidal Behavior  Goal: Suicidal Behavior is Absent or Managed  Outcome: Not Progressing     Problem: Anxiety  Goal: Anxiety Reduction or Resolution  Outcome: Not Progressing     Problem: Sleep Disturbance  Goal: Adequate Sleep/Rest  Outcome: Not Progressing     Problem: Seclusion/Restraint, Behavioral  Goal: Seclusion/Behavioral Restraint Goal: Absence of Harm or Injury  Outcome: Not Progressing     Problem: Pain Acute  Goal: Optimal Pain Control and Function  Outcome: Not Progressing   Goal Outcome Evaluation:

## 2024-12-06 NOTE — PROGRESS NOTES
"  ----------------------------------------------------------------------------------------------------------  [facility]  Psychiatry Progress Note  Hospital Day #55     Interim History:     The patient's care was discussed with the treatment team and chart notes were reviewed.    Vitals: VSS  Sleep: 4.25 hours (12/06/24 0615)  Scheduled medications: Took all scheduled medications as prescribed  Psychiatric PRN medications:   None  Staff Report:   - Continues to be disorganized and circumstantial.   - Presented as labile but mostly cooperative and medication compliant.   - He denied anxiety or depression, denied SI/HI. Mistrustful at times. Patient denied any pain or discomfort. ADLs WNL.   - Patient appears to have slept a total of 4.25 hours.   - Continues on 1:1 10 ft SIO (5 ft when close to the doors) for assault risk and severe intrusiveness.     - Please see staff notes for details.      Subjective:     Patient Interview:  Patient stated his sleep was \"lumpy and up and down.\" Didn't wake up at all in the middle of the night. Says he falls asleep quickly. Feels well rested. Said there was a message for a little kid. Says the medications are going ok. Denies pain or any discomfort. Denies dizziness, nausea/vomiting, constipation and abdominal discomfort. Feels like his stomach is growing. Says he played football when he was younger. Says his wife is trying to suppress him. And the team should let her know that he is fine.    ROS:  See above     Objective:     Vitals:  /64   Pulse 60   Temp 98.2  F (36.8  C) (Oral)   Resp 16   Wt 109.6 kg (241 lb 9.6 oz)   SpO2 97%   BMI 39.00 kg/m      Allergies:  No Known Allergies    Current Medications:  Scheduled:  Current Facility-Administered Medications   Medication Dose Route Frequency Provider Last Rate Last Admin    acetaminophen (TYLENOL) tablet 650 mg  650 mg Oral Q4H PRN iNkki Caceres MD   650 mg at " 11/30/24 1133    alum & mag hydroxide-simethicone (MAALOX) suspension 30 mL  30 mL Oral Q4H PRN Nikki Caceres MD   30 mL at 10/17/24 0837    amLODIPine (NORVASC) tablet 10 mg  10 mg Oral Daily Presley Barrera MD   10 mg at 12/06/24 0832    atorvastatin (LIPITOR) tablet 40 mg  40 mg Oral Daily Nikki Caceres MD   40 mg at 12/06/24 0832    cholecalciferol (VITAMIN D3) capsule 1,250 mcg  1,250 mcg Oral Q7 Days Evelia Grimm DO   1,250 mcg at 12/03/24 0943    cinacalcet (SENSIPAR) tablet 30 mg  30 mg Oral Daily Presley Barrera MD   30 mg at 12/06/24 0832    cloNIDine (CATAPRES) tablet 0.1 mg  0.1 mg Oral BID Presley Barrera MD   0.1 mg at 12/06/24 0832    furosemide (LASIX) tablet 40 mg  40 mg Oral Daily Presley Barrera MD   40 mg at 12/06/24 0832    gabapentin (NEURONTIN) capsule 100 mg  100 mg Oral Q6H PRN Nikki Caceres MD   100 mg at 11/15/24 0418    hydrocortisone (CORTAID) 0.5 % cream   Topical Daily PRN Savi Chamorro MD        lisinopril (ZESTRIL) tablet 40 mg  40 mg Oral Daily Presley Barrera MD   40 mg at 12/06/24 0832    melatonin tablet 3 mg  3 mg Oral At Bedtime Presley Barrera MD   3 mg at 12/05/24 1949    metoprolol succinate ER (TOPROL XL) 24 hr tablet 50 mg  50 mg Oral Daily Presley Barrera MD   50 mg at 12/06/24 0832    nicotine (NICODERM CQ) 14 MG/24HR 24 hr patch 1 patch  1 patch Transdermal Daily Evelia Grimm DO   1 patch at 12/05/24 0859    nicotine (NICODERM CQ) 21 MG/24HR 24 hr patch 1 patch  1 patch Transdermal Daily PRN Britt Kang MD   1 patch at 10/13/24 1611    nicotine (NICORETTE) gum 2 mg  2 mg Buccal Q1H PRN González Garcia MD   2 mg at 10/24/24 1254    OLANZapine zydis (zyPREXA) ODT tab 5 mg  5 mg Oral At Bedtime González Garcia MD   5 mg at 12/05/24 1949    Or    OLANZapine (zyPREXA) injection 10 mg  10 mg Intramuscular At Bedtime González Garcia MD        OLANZapine (zyPREXA) tablet 5 mg  5 mg Oral TID PRN Evelia Grimm DO   5 mg at 11/24/24 0436    Or     OLANZapine (zyPREXA) injection 5 mg  5 mg Intramuscular TID PRN Evelia Grimm DO        polyethylene glycol (MIRALAX) Packet 17 g  17 g Oral Daily PRN Nikki Caceres MD        traZODone (DESYREL) half-tab 25 mg  25 mg Oral At Bedtime PRN Evelia Grimm DO   25 mg at 11/29/24 0148    Or    traZODone (DESYREL) tablet 50 mg  50 mg Oral At Bedtime PRN Evelia Grimm DO           PRN:  Current Facility-Administered Medications   Medication Dose Route Frequency Provider Last Rate Last Admin    acetaminophen (TYLENOL) tablet 650 mg  650 mg Oral Q4H PRN Nikki Caceres MD   650 mg at 11/30/24 1133    alum & mag hydroxide-simethicone (MAALOX) suspension 30 mL  30 mL Oral Q4H PRN Nikki Caceres MD   30 mL at 10/17/24 0837    amLODIPine (NORVASC) tablet 10 mg  10 mg Oral Daily Presley Barrera MD   10 mg at 12/06/24 0832    atorvastatin (LIPITOR) tablet 40 mg  40 mg Oral Daily Nikki Caceres MD   40 mg at 12/06/24 0832    cholecalciferol (VITAMIN D3) capsule 1,250 mcg  1,250 mcg Oral Q7 Days Evelia Grimm DO   1,250 mcg at 12/03/24 0943    cinacalcet (SENSIPAR) tablet 30 mg  30 mg Oral Daily Presley Barrera MD   30 mg at 12/06/24 0832    cloNIDine (CATAPRES) tablet 0.1 mg  0.1 mg Oral BID Presley Barrera MD   0.1 mg at 12/06/24 0832    furosemide (LASIX) tablet 40 mg  40 mg Oral Daily Presley Barrera MD   40 mg at 12/06/24 0832    gabapentin (NEURONTIN) capsule 100 mg  100 mg Oral Q6H PRN Nikki Caceres MD   100 mg at 11/15/24 0418    hydrocortisone (CORTAID) 0.5 % cream   Topical Daily PRN Savi Chamorro MD        lisinopril (ZESTRIL) tablet 40 mg  40 mg Oral Daily Presley Barrera MD   40 mg at 12/06/24 0832    melatonin tablet 3 mg  3 mg Oral At Bedtime Presley Barrera MD   3 mg at 12/05/24 1949    metoprolol succinate ER (TOPROL XL) 24 hr tablet 50 mg  50 mg Oral Daily Presley Barrera MD   50 mg at 12/06/24 0832    nicotine (NICODERM CQ) 14 MG/24HR 24 hr patch 1 patch  1 patch Transdermal Daily Sirisha  DO Evelia   1 patch at 12/05/24 0859    nicotine (NICODERM CQ) 21 MG/24HR 24 hr patch 1 patch  1 patch Transdermal Daily PRN Britt Kang MD   1 patch at 10/13/24 1611    nicotine (NICORETTE) gum 2 mg  2 mg Buccal Q1H PRN González Garcia MD   2 mg at 10/24/24 1254    OLANZapine zydis (zyPREXA) ODT tab 5 mg  5 mg Oral At Bedtime González Garcia MD   5 mg at 12/05/24 1949    Or    OLANZapine (zyPREXA) injection 10 mg  10 mg Intramuscular At Bedtime González Garcia MD        OLANZapine (zyPREXA) tablet 5 mg  5 mg Oral TID PRN Evelia Grimm DO   5 mg at 11/24/24 0436    Or    OLANZapine (zyPREXA) injection 5 mg  5 mg Intramuscular TID PRN Evelia Grimm DO        polyethylene glycol (MIRALAX) Packet 17 g  17 g Oral Daily PRN Nikki Caceres MD        traZODone (DESYREL) half-tab 25 mg  25 mg Oral At Bedtime PRN Evelia Grimm DO   25 mg at 11/29/24 0148    Or    traZODone (DESYREL) tablet 50 mg  50 mg Oral At Bedtime PRN Evelia Grimm DO         Labs and Imaging:  Data this admission:  - CBC unremarkable  - CMP unremarkable  - TSH normal  - UDS negative  - Vit D low  - Hgb A1c 5.9 (10/13/24)  - Lipids unremarkable  - Vit B12 normal  - Folate normal  - Urinalysis unremarkable  - EKG normal sinus rhythm, QTc 390 ms  - Head CT showed no acute changes  - HIV non reactive  - Treponema antibody non reactive  - ESR wnl   - Ceruloplasmin wnl   - BOOGIE negative  - Lyme negative      Parathyroid hormone:   10/13: 85     Calcium:  10/14: 11.4  10/16: 10.3  10/17: 10.3  10/19: 9.8  10/21: 10.6  10/23: 10.3     Albumin:  10/14: 4.3  10/16: 4.3  10/17: 4.1  10/19: 4.2  10/21: 4.5  10/23: 4.3      CXR:   Impression:   1. No definite radiographic evidence of asbestosis. If clinically  indicated, consider evaluation with high resolution chest CT.  2. Nonspecific left costophrenic blunting. Given symmetrically low  lung volumes may be related to poor inspiratory effort/timing.  3. Pulmonary vascular congestion.     MRI:  "  IMPRESSION:  1. No acute intracranial pathology.  2. No focal lesion or structural abnormality.   3. Symmetric frontoparietal cortical volume loss slightly more than  expected for age.     Mental Status Exam:   Oriented to: Oriented to self but not time and place  General:  Awake and Alert  Appearance:  appears stated age, Grooming is adequate, and Dressed in his own clothes  Behavior/Attitude:  Calm and Easily distracted  Eye Contact: Appropriate for conversation  Psychomotor: No evidence of tics, dystonia, or tardive dyskinesia  no catatonia present  Speech:  appropriate volume/tone  Language: Fluent in English with appropriate syntax and vocabulary.  Mood:  \"fine\"  Affect:  confused  Thought Process:  disorganized and confused  Thought Content:   did not assess SI/HI/ delusion of confusion . Patient denies paranoia  Associations:  loose  Insight:  limited   Judgment:  limited   Impulse control: limited  Attention Span:   decreased  Concentration:   distractible  Recent and Remote Memory:   remote memory intact, recent memory impaired  Fund of Knowledge: average  Muscle Strength and Tone: normal  Gait and Station: Normal     Psychiatric Assessment     Estevan Aaron is a 64 year old male with previous psychiatric diagnoses of GEORGINA admitted from the ER on 10/12/2024 due to concern for HI and psychosis in the context of medical issues (hyperthyroidism, hypercalcemia), psychosocial stressors including with recent divorce and selling his home. This is the patient's first psychiatric hospitalization. Significant symptoms on admission included delusions of persecution with grandiose beliefs, homicidal ideations, as well as disorganized thinking and behavior. The MSE on admission was pertinent for a thought process which was perseverative, circumstantial, tangential, disorganized and tangential; with rambling and looseness of associations. Psychological contributions to presentation included lack of insight. Social " factors contributing to presentation included isolation, recent divorce, selling his house, and moving from hotel to hotel. Biological contributions to presentation included a history of hyperparathyroidism with chronic hypercalcemia per charts as well as a history of methamphetamine use per collateral from ex-wife.     History was difficult to obtain due to the patient's severe disorganized thinking on interview; he was observed with persecutory delusions pertaining to the realtor and gang members, disorganized behavior as these delusions have led him to flee to different hotels to stay safe/ has called  complaining of being targeted and auditory hallucinations of voices for the past 12 months. Per collateral from ex-wife, Santos has had paranoid ideations since they first met. He has always felt like people are out to get him or trying to rip him off. She says that he has had visual hallucinations (he will point things out that the rest of the family can't see) for a long time, but the family would just go along with him to avoid making him angry. This timeline and presentation could be consistent with diagnosis of paranoid personality disorder. Things began to worsen around the beginning of the COVID pandemic, when Santos became more isolated and the family started to notice cognitive issues such as impaired memory. Immediately prior to this hospitalization, the ex-wife found Santos in a hotel room with a generator full of gasoline, binoculars, and hunting equipment. This time course does not suggest acute psychosis, however given the patient has never had a full psychiatric work-up, we completed a first-episode psychosis work-up.      Other things that could be contributing to his presentation is hyperparathyroidism with hypercalcemia. However, patient's calcium returned to normal limits on 10/16, and patient continues to have psychosis so likely not a significant contributing factor to patient's presentation.       Patient also continues to be disoriented and his behaviors appear to worsen in the evenings. This raised concern for underlying neurocognitive disorder with delirium. Patient received MRI brain with and without contrast which showed frontal and parietal atrophy greater than would be expected for patient's age. This is consistent with neurocognitive disorder. Patient has continued to improve with decreasing his antipsychotic. Working to get patient transferred to geriatric psychiatry in the short term and to memory care long term.      Given that he currently has psychosis, patient warrants inpatient psychiatric hospitalization to maintain his safety.     Psychiatric Plan by Diagnosis   Today's changes:  None  # Major Neurocognitive Disorder  # Rule out paranoid personality disorder  1. Medications:  - Olanzapine 5 mg at bedtime  - Neurology consult 11/8/24, recommend outpatient follow-up and neuropsychiatric testing     2. Pertinent Labs/Monitoring:   - See above     3. Additional Plans:  - Patient will be treated in therapeutic milieu with appropriate individual and group therapies as described     # Unspecified anxiety vs Generalized Anxiety Disorder  - Monitor for symptoms.  - Fluoxetine held due to suspicion of ongoing manic symptoms     Psychiatric Hospital Course:      Estevan Aaron was admitted to [facility] on a 72 hour hold.   Medications:  PTA fluoxetine was held due to concern for worsening of zelalme   New medications started at the time of admission include Zyprexa.   Increased olanzapine 10 mg at bedtime was to 10 mg BID (10/14)   Increased olanzapine 10 mg BID to 10 mg during day and 15 mg at bedtime (10/15)  10/21: increased olanzapine pm dose from 15 to 20 mg  10/23: started melatonin 3 mg at 7 pm to help patient with circadian rhythm as he has been staying up throughout the night and sleeping a lot during the day and there is some concern for delirium   10/24: consulted anesthesia for MRI brain    10/28: MRI brain complete, decrease AM olanzapine from 10 to 5 mg  10/29: Morning olanzapine decreased to 2.5 mg, evening olanzapine decreased to 15 mg   11/1: Decreased evening olanzapine to 10 mg   11/4: Decreased evening olanzapine to 7.5 mg   11/5: Decreased evening olanzapine to 5 mg   11/11: Moved morning dose of olanzapine to the afternoon as patient appears more agitated in the afternoons/evenings  11/21: Started memantine 5 mg daily   11/26: Discontinued olanzapine 2.5 mg during the afternoon  12/2:   Discontinued memantine 5 mg, daily     Care conference 10/18/24 with daughter Maria C and ex-wife Clifford  - Report that patient has always been paranoid since ex-wife first met him. She describes him always feeling like he is getting ripped off.   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 They reported once incidence in which he made a bid on a job and the client paid him, but he never ended up finishing the job  - Wife and him  officially this year, and since selling the house several months ago, patient has been moving from hotel to hotel due to thinking people are out to get him   - When they were selling their house, patient threatened realtors and felt he was being ripped off   - Clifford reports that she started to be afraid to be around him alone  - When he was found in the hotel, he had a generator full of gasoline, binoculars, bullets, and hunting equipment.     10/21/24: updated daughter on Santos's treatment plan      10/23/24: updated daughter on Santos's treatment plan       10/25/24: updated daughter on Santos's treatment plan, including plan to try and get MRI brain done under sedation. She said that Santos's grandfather had dementia and his sister has a brain tumor.      10/28/24: updated daughter on Santos's MRI results. She is planning on coming into town soon.      Care conference 11/1/24 with daughters Maria C and Estefani and ex-wife Clifford  - Discussed dementia diagnosis   - Clifford asked about plans moving forward. Explained that applying for medical assistance is the first step. Explained that application processing time can be very variable. Informed family that Santos will most likely be staying on this inpatient unit during this time.   - Clifford expressed that their family would like to be the power of /have conservatorship for Santos.   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 Reports that he has bills that he has not paid.   - Maria C is planning to move to [place], and Clifford currently lives in [place]. Family prefer that Santos would in a facility that are near these locations. Discussed with family that they can also try to request a specific location (memory care).   - Clifford reports that he had previously gotten help applying for his social security and medicare, but unsure if it had been approved.   - Informed family that there is a geriatric unit and there might be a transfer if there becomes availability on this unit.   - Family shared that he was completely different just two months ago.   - Estefani shared that his family found his medications in his old truck recently, expressed that it was likely that he was not taking his medications prior to his hospitalization.      11/6/24: updated Maria C on plan to try and transfer Santos to Geriatric unit.      11/11/24: updated Maria C on neurology consult      The risks, benefits, alternatives, and side effects were discussed and understood by the patient and  other caregivers.     Medical Assessment and Plan     Medical diagnoses to be addressed this admission:    # Hypertension  - Continue PTA medications  Furosemide 40 mg daily  Lisinopril 40 mg daily  Metoprolol 50 mg daily  Amlodipine 10 mg daily  Clonidine 0.1 mg BID     # Hyperlipidemia  - Continue PTA Atorvastatin 40 mg     # Hyperparathyroidism  # Hypercalcemia, hypophosphoremia   Increased Ca level to 10.9 and decreased Ph level to 2.3 in the ED. Suspicion patient's hypercalcemia could be contributing to symptoms of psychosis.  - Consulted endocrinology, who started cinacalcet 30 mg BID on 10/13  - 10/16 endocrinology recommended continuing to trend calcium and albumin to make sure patient does not become hypocalcemic and recommended holding cinacalcet   - 10/17, calcium and albumin wnl, endo recommended cinacalcet 30 mg once daily   - 10/21, per endo continue cincaclcet and recheck calcium and albumin on October 24  - 10/23: per endo, patient needs to follow up outpatient for further management of hyperparathyroidism      Medical course: Patient was physically examined by the ED prior to being transferred to the unit and was found to be medically stable and appropriate for admission.      Consults: Psychiatry, Endocrinology (follow-up of hyperthyroidism / hypercalcemia and hypertension), neurology     Checklist     Legal Status: Committed     Safety Assessment:   Behavioral Orders   Procedures    Assault precautions    Code 1 - Restrict to Unit    Routine Programming     As clinically indicated    Status 15     Every 15 minutes.    Status Individual Observation     1:1 during evening shift, & night shift.    If patient refuses overnight presence of staff in his room, it's ok to do 15 min checks through the window blinds.    Patient SIO status reviewed with team/RN.  Please also refer to RN/team documentation for add'l detail.    -SIO staff to monitor following which have contributed to patient being on SIO:  Pt  has psychosis regarding being followed and attacked, very paranoid, HI    -Possible interventions SIO staff could use to support patient's treatment progress:  Redirect and reassure  -When following observed, team will review discontinuation of SIO:  Psychosis improves     Order Specific Question:   CONTINUOUS 24 hours / day     Answer:   Other     Order Specific Question:   Specify distance     Answer:   10 feet, 5 feet when close to the doors     Order Specific Question:   Indications for SIO     Answer:   Assault risk     Order Specific Question:   Indications for SIO     Answer:   Severe intrusiveness       Risk Assessment:  Risk for harm is elevated.  Risk factors: impulsive and past behaviors  Protective factors: family      Disposition: Pending stabilization, medication optimization, & development of a safe discharge plan.     Attestations   Resident without student: This patient was seen and discussed with my attending physician.  [facility]  Psychiatry Resident Physician    Attestation:  This patient has been seen and evaluated by me, Pete Smith MD.  I have discussed this patient with the house staff team including the resident and/or medical student and I agree with the findings and plan in this note.    I have reviewed today's vital signs, medications, labs and imaging. Pete Smith MD , PhD.

## 2024-12-06 NOTE — PLAN OF CARE
Problem: Sleep Disturbance  Goal: Adequate Sleep/Rest  Outcome: Progressing   Goal Outcome Evaluation:    Patient appears to have slept a total of 4.25 hours. At times irritable and needing redirection as usual.     Safety/environment checks conducted every 15 minutes with no new concerns noted. No complaints of pain/discomfort.      Continues on 1:1 10 ft SIO (5 ft when close to the doors) for assault risk and severe intrusiveness.

## 2024-12-07 PROCEDURE — 250N000013 HC RX MED GY IP 250 OP 250 PS 637

## 2024-12-07 PROCEDURE — 124N000002 HC R&B MH UMMC

## 2024-12-07 RX ADMIN — Medication 3 MG: at 21:27

## 2024-12-07 RX ADMIN — CLONIDINE HYDROCHLORIDE 0.1 MG: 0.1 TABLET ORAL at 21:26

## 2024-12-07 RX ADMIN — CLONIDINE HYDROCHLORIDE 0.1 MG: 0.1 TABLET ORAL at 08:00

## 2024-12-07 RX ADMIN — FUROSEMIDE 40 MG: 40 TABLET ORAL at 08:00

## 2024-12-07 RX ADMIN — ATORVASTATIN CALCIUM 40 MG: 20 TABLET, FILM COATED ORAL at 08:00

## 2024-12-07 RX ADMIN — CINACALCET 30 MG: 30 TABLET ORAL at 08:00

## 2024-12-07 RX ADMIN — METOPROLOL SUCCINATE 50 MG: 50 TABLET, EXTENDED RELEASE ORAL at 08:00

## 2024-12-07 RX ADMIN — OLANZAPINE 5 MG: 5 TABLET, ORALLY DISINTEGRATING ORAL at 21:27

## 2024-12-07 RX ADMIN — AMLODIPINE BESYLATE 10 MG: 5 TABLET ORAL at 08:00

## 2024-12-07 RX ADMIN — LISINOPRIL 40 MG: 10 TABLET ORAL at 08:00

## 2024-12-07 RX ADMIN — Medication 1 PATCH: at 08:00

## 2024-12-07 ASSESSMENT — ACTIVITIES OF DAILY LIVING (ADL)
ORAL_HYGIENE: PROMPTS;INDEPENDENT
ADLS_ACUITY_SCORE: 64
LAUNDRY: UNABLE TO COMPLETE
HYGIENE/GROOMING: PROMPTS;INDEPENDENT
ADLS_ACUITY_SCORE: 64
ORAL_HYGIENE: INDEPENDENT
ADLS_ACUITY_SCORE: 64
HYGIENE/GROOMING: PROMPTS;INDEPENDENT
ADLS_ACUITY_SCORE: 64
DRESS: INDEPENDENT
ADLS_ACUITY_SCORE: 64
LAUNDRY: UNABLE TO COMPLETE
ADLS_ACUITY_SCORE: 64
DRESS: INDEPENDENT
ADLS_ACUITY_SCORE: 64

## 2024-12-07 NOTE — PLAN OF CARE
Problem: Adult Behavioral Health Plan of Care  Goal: Plan of Care Review  Outcome: Progressing  Flowsheets (Taken 12/7/2024 1610)  Patient Agreement with Plan of Care: agrees     Problem: Psychotic Signs/Symptoms  Goal: Improved Behavioral Control (Psychotic Signs/Symptoms)  Outcome: Progressing     Problem: Anxiety  Goal: Anxiety Reduction or Resolution  Outcome: Progressing   Goal Outcome Evaluation:    Plan of Care Reviewed With: patient       Pt has been pacing the halls back and forth with SIO staff in place, visible in the milieu, presents with flat affect, mood labile. Speech remains tangential, pt disorganized with lack of focus during conversation. Pt unable to answer mental health assessment questions and shifted to something else. Pt later intrusive however less than yesterday. Upon approach during HS medication, pt was calling the writer names and required a lot of prompts and encouragement before he finally took it. Hygiene is ok, intake and food was adequate

## 2024-12-07 NOTE — PLAN OF CARE
Pt had a good shift today, no behavioral escalations. Denies all mental health symptoms. Disorganized at times but redirectable. Pt took a shower this shift. Med compliant, no PRNs. No other concerns at this time.    Problem: Psychotic Signs/Symptoms  Goal: Improved Behavioral Control (Psychotic Signs/Symptoms)  Outcome: Progressing   Goal Outcome Evaluation:    Plan of Care Reviewed With: patient

## 2024-12-07 NOTE — PLAN OF CARE
Problem: Sleep Disturbance  Goal: Adequate Sleep/Rest  Outcome: Progressing   Goal Outcome Evaluation:    Patient appears to have slept a total of 6 hours. Safety/environment checks conducted every 15 minutes with no concerns noted. No complaints of pain/discomfort.

## 2024-12-08 PROCEDURE — 250N000013 HC RX MED GY IP 250 OP 250 PS 637

## 2024-12-08 PROCEDURE — 124N000002 HC R&B MH UMMC

## 2024-12-08 RX ADMIN — Medication 3 MG: at 21:41

## 2024-12-08 RX ADMIN — AMLODIPINE BESYLATE 10 MG: 5 TABLET ORAL at 08:41

## 2024-12-08 RX ADMIN — METOPROLOL SUCCINATE 50 MG: 50 TABLET, EXTENDED RELEASE ORAL at 08:41

## 2024-12-08 RX ADMIN — CLONIDINE HYDROCHLORIDE 0.1 MG: 0.1 TABLET ORAL at 21:40

## 2024-12-08 RX ADMIN — FUROSEMIDE 40 MG: 40 TABLET ORAL at 08:41

## 2024-12-08 RX ADMIN — LISINOPRIL 40 MG: 10 TABLET ORAL at 08:41

## 2024-12-08 RX ADMIN — CLONIDINE HYDROCHLORIDE 0.1 MG: 0.1 TABLET ORAL at 08:41

## 2024-12-08 RX ADMIN — Medication 1 PATCH: at 08:41

## 2024-12-08 RX ADMIN — OLANZAPINE 5 MG: 5 TABLET, ORALLY DISINTEGRATING ORAL at 21:40

## 2024-12-08 RX ADMIN — ATORVASTATIN CALCIUM 40 MG: 20 TABLET, FILM COATED ORAL at 08:41

## 2024-12-08 RX ADMIN — CINACALCET 30 MG: 30 TABLET ORAL at 08:41

## 2024-12-08 ASSESSMENT — ACTIVITIES OF DAILY LIVING (ADL)
ADLS_ACUITY_SCORE: 54
ADLS_ACUITY_SCORE: 64
ADLS_ACUITY_SCORE: 64
ADLS_ACUITY_SCORE: 54
ADLS_ACUITY_SCORE: 64
HYGIENE/GROOMING: INDEPENDENT
ADLS_ACUITY_SCORE: 54
ADLS_ACUITY_SCORE: 64
ADLS_ACUITY_SCORE: 64
ADLS_ACUITY_SCORE: 54
ADLS_ACUITY_SCORE: 54
ORAL_HYGIENE: INDEPENDENT;PROMPTS
ADLS_ACUITY_SCORE: 64
LAUNDRY: WITH SUPERVISION
ADLS_ACUITY_SCORE: 54
ADLS_ACUITY_SCORE: 54
ADLS_ACUITY_SCORE: 64
ADLS_ACUITY_SCORE: 64
LAUNDRY: UNABLE TO COMPLETE
ADLS_ACUITY_SCORE: 64
DRESS: STREET CLOTHES
ORAL_HYGIENE: INDEPENDENT
ADLS_ACUITY_SCORE: 64
HYGIENE/GROOMING: INDEPENDENT;PROMPTS
ADLS_ACUITY_SCORE: 64
ADLS_ACUITY_SCORE: 64
DRESS: INDEPENDENT
ADLS_ACUITY_SCORE: 64
ADLS_ACUITY_SCORE: 64

## 2024-12-08 NOTE — PLAN OF CARE
Problem: Anxiety  Goal: Anxiety Reduction or Resolution  12/8/2024 1656 by Tamie Fraser RN  Outcome: Progressing  12/8/2024 1651 by Tamie Fraser RN  Outcome: Progressing     Problem: Sleep Disturbance  Goal: Adequate Sleep/Rest  12/8/2024 1652 by Tamie Fraser RN  Outcome: Progressing  12/8/2024 1649 by Tamie Fraser RN  Outcome: Progressing     Problem: Adult Behavioral Health Plan of Care  Goal: Adheres to Safety Considerations for Self and Others  Intervention: Develop and Maintain Individualized Safety Plan  Recent Flowsheet Documentation  Taken 12/8/2024 1629 by Tamie Fraser RN  Safety Measures:   environmental rounds completed   safety rounds completed  Intervention: Develop and Maintain Individualized Safety Plan  Recent Flowsheet Documentation  Taken 12/8/2024 1629 by Tamie Fraser RN  Safety Measures:   environmental rounds completed   safety rounds completed   Goal Outcome Evaluation:    Pt was visible in the milieu watching TV with peers. Affect labile. Mood calm and cooperative. Pt denied pain and was dismissive with the rest of mental health and psych symptoms. Po intake is adequate. Pt was compliant with medications. No PRN administered. No safety concerns. No behavioral concerns.

## 2024-12-08 NOTE — PLAN OF CARE
Problem: Sleep Disturbance  Goal: Adequate Sleep/Rest  Outcome: Progressing  Patient sleeps on and off this shift. Affect flat, mood calm, no behavioral issues or any safety concerns so far. Patient no complaints of pain and discomfort.Patient on status individual observation with 1 staffing, and space restriction for safety, to prevent/manage severe intrusiveness, assaultive behavior, to mitigate safety risks.  Patient slept for 5.75 hours.

## 2024-12-09 PROCEDURE — 250N000013 HC RX MED GY IP 250 OP 250 PS 637

## 2024-12-09 PROCEDURE — 124N000002 HC R&B MH UMMC

## 2024-12-09 PROCEDURE — 99231 SBSQ HOSP IP/OBS SF/LOW 25: CPT | Mod: GC | Performed by: PSYCHIATRY & NEUROLOGY

## 2024-12-09 RX ADMIN — Medication 1 PATCH: at 08:06

## 2024-12-09 RX ADMIN — METOPROLOL SUCCINATE 50 MG: 50 TABLET, EXTENDED RELEASE ORAL at 08:06

## 2024-12-09 RX ADMIN — LISINOPRIL 40 MG: 10 TABLET ORAL at 08:06

## 2024-12-09 RX ADMIN — ATORVASTATIN CALCIUM 40 MG: 20 TABLET, FILM COATED ORAL at 08:06

## 2024-12-09 RX ADMIN — OLANZAPINE 5 MG: 5 TABLET, ORALLY DISINTEGRATING ORAL at 21:15

## 2024-12-09 RX ADMIN — AMLODIPINE BESYLATE 10 MG: 5 TABLET ORAL at 08:06

## 2024-12-09 RX ADMIN — Medication 3 MG: at 21:14

## 2024-12-09 RX ADMIN — CLONIDINE HYDROCHLORIDE 0.1 MG: 0.1 TABLET ORAL at 21:15

## 2024-12-09 RX ADMIN — FUROSEMIDE 40 MG: 40 TABLET ORAL at 08:07

## 2024-12-09 RX ADMIN — CLONIDINE HYDROCHLORIDE 0.1 MG: 0.1 TABLET ORAL at 08:06

## 2024-12-09 RX ADMIN — CINACALCET 30 MG: 30 TABLET ORAL at 08:06

## 2024-12-09 ASSESSMENT — ACTIVITIES OF DAILY LIVING (ADL)
ADLS_ACUITY_SCORE: 54
ORAL_HYGIENE: INDEPENDENT
ADLS_ACUITY_SCORE: 54
ORAL_HYGIENE: INDEPENDENT
ADLS_ACUITY_SCORE: 54
HYGIENE/GROOMING: INDEPENDENT
ADLS_ACUITY_SCORE: 54
LAUNDRY: WITH SUPERVISION
DRESS: INDEPENDENT
ADLS_ACUITY_SCORE: 54
ADLS_ACUITY_SCORE: 54
DRESS: INDEPENDENT
LAUNDRY: WITH SUPERVISION
ADLS_ACUITY_SCORE: 54
HYGIENE/GROOMING: INDEPENDENT
ADLS_ACUITY_SCORE: 54

## 2024-12-09 NOTE — PLAN OF CARE
Brief Psychotherapy Note    Therapist checked in with Pt to remind Pt about psychotherapy service available.    Pt response: Pt not interested currently in meeting 1:1, was at the phone in [facility], concerned with contacting someone about getting his vehicles out of storage. Shared he may be willing to meet later this week.    Plan: Writer will remain available to Pt and check in again later this week.

## 2024-12-09 NOTE — PROGRESS NOTES
"  ----------------------------------------------------------------------------------------------------------  [facility]  Psychiatry Progress Note  Hospital Day #58     Interim History:     The patient's care was discussed with the treatment team and chart notes were reviewed.    Vitals: VSS  Sleep: 5.5 hours (12/09/24 0600)  Scheduled medications: Took all scheduled medications as prescribed  Psychiatric PRN medications:   None  Staff Report:   -Pt very fixated on clogged toilet, though explained numerous times that this needs to be done by maintenance.   -Affect flat, mood calm, no behavioral issues or any safety concerns so far.   -No complaints of pain and discomfort  -Patient on status individual observation with 1 staffing, and space restriction for safety, to prevent/manage severe intrusiveness, assaultive behavior, to mitigate safety risks.   -Patient slept for 5.5 hours.     - Please see staff notes for details.      Subjective:     Patient Interview:  Patient reports he is doing good today and slept well last night. He endorses his mood is ''ok\" and denies any new symptoms today. Patient reports he would like to go sometime this week. He says \"I thought you let me go last time in the [place] [place]\". Patient wonders about his equipments and says \"it is money, I am not going to rent a locker that is expensive\". \"And she is out there spending money\". \"I don't appreciate that, we shook on that\". \"I have a lot of nice stuff, including the deer head of course\". Patient states he is willing to look at potential placement somewhere in [place], and likes that it is local. Santos talks about the games over the weekend, and says it was hard off the screen. Patient asks who is playing football tonight and responds \"sweet\" after being informed.    ROS:  See above     Objective:     Vitals:  /62   Pulse 58   Temp 97.6  F (36.4  C) (Oral)   Resp 16   Wt 109.6 kg (241 " lb 9.6 oz)   SpO2 96%   BMI 39.00 kg/m      Allergies:  No Known Allergies    Current Medications:  Scheduled:  Current Facility-Administered Medications   Medication Dose Route Frequency Provider Last Rate Last Admin    acetaminophen (TYLENOL) tablet 650 mg  650 mg Oral Q4H PRN Nikki Caceres MD   650 mg at 11/30/24 1133    alum & mag hydroxide-simethicone (MAALOX) suspension 30 mL  30 mL Oral Q4H PRN Nikki Caceres MD   30 mL at 10/17/24 0837    amLODIPine (NORVASC) tablet 10 mg  10 mg Oral Daily Presley Barrera MD   10 mg at 12/09/24 0806    atorvastatin (LIPITOR) tablet 40 mg  40 mg Oral Daily Nikki Caceres MD   40 mg at 12/09/24 0806    cholecalciferol (VITAMIN D3) capsule 1,250 mcg  1,250 mcg Oral Q7 Days Evelia Grimm DO   1,250 mcg at 12/03/24 0943    cinacalcet (SENSIPAR) tablet 30 mg  30 mg Oral Daily Presley Barrera MD   30 mg at 12/09/24 0806    cloNIDine (CATAPRES) tablet 0.1 mg  0.1 mg Oral BID Presley Barrera MD   0.1 mg at 12/09/24 0806    furosemide (LASIX) tablet 40 mg  40 mg Oral Daily Presley Barrera MD   40 mg at 12/09/24 0807    gabapentin (NEURONTIN) capsule 100 mg  100 mg Oral Q6H PRN Nikki Caceres MD   100 mg at 11/15/24 0418    hydrocortisone (CORTAID) 0.5 % cream   Topical Daily PRN Savi Chamorro MD        lisinopril (ZESTRIL) tablet 40 mg  40 mg Oral Daily Presley Barrera MD   40 mg at 12/09/24 0806    melatonin tablet 3 mg  3 mg Oral At Bedtime Presley Barrera MD   3 mg at 12/08/24 2141    metoprolol succinate ER (TOPROL XL) 24 hr tablet 50 mg  50 mg Oral Daily Presley Barrera MD   50 mg at 12/09/24 0806    nicotine (NICODERM CQ) 14 MG/24HR 24 hr patch 1 patch  1 patch Transdermal Daily Evelia Grimm DO   1 patch at 12/09/24 0806    nicotine (NICODERM CQ) 21 MG/24HR 24 hr patch 1 patch  1 patch Transdermal Daily PRN Britt Kang MD   1 patch at 10/13/24 1611    nicotine (NICORETTE) gum 2 mg  2 mg Buccal Q1H PRN González Garcia MD   2 mg at 10/24/24  1254    OLANZapine zydis (zyPREXA) ODT tab 5 mg  5 mg Oral At Bedtime González Garcia MD   5 mg at 12/08/24 2140    Or    OLANZapine (zyPREXA) injection 10 mg  10 mg Intramuscular At Bedtime González Garcia MD        OLANZapine (zyPREXA) tablet 5 mg  5 mg Oral TID PRN Evelia Grimm DO   5 mg at 11/24/24 0436    Or    OLANZapine (zyPREXA) injection 5 mg  5 mg Intramuscular TID PRN Evelia Grimm DO        polyethylene glycol (MIRALAX) Packet 17 g  17 g Oral Daily PRN Nikki Caceres MD        traZODone (DESYREL) half-tab 25 mg  25 mg Oral At Bedtime PRN Evelia Grimm DO   25 mg at 11/29/24 0148    Or    traZODone (DESYREL) tablet 50 mg  50 mg Oral At Bedtime PRN Evelia Grimm DO           PRN:  Current Facility-Administered Medications   Medication Dose Route Frequency Provider Last Rate Last Admin    acetaminophen (TYLENOL) tablet 650 mg  650 mg Oral Q4H PRN Nikki Caceres MD   650 mg at 11/30/24 1133    alum & mag hydroxide-simethicone (MAALOX) suspension 30 mL  30 mL Oral Q4H PRN Nikki Caceres MD   30 mL at 10/17/24 0837    amLODIPine (NORVASC) tablet 10 mg  10 mg Oral Daily Presley Barrera MD   10 mg at 12/09/24 0806    atorvastatin (LIPITOR) tablet 40 mg  40 mg Oral Daily Nikki Caceres MD   40 mg at 12/09/24 0806    cholecalciferol (VITAMIN D3) capsule 1,250 mcg  1,250 mcg Oral Q7 Days Evelia Grimm DO   1,250 mcg at 12/03/24 0943    cinacalcet (SENSIPAR) tablet 30 mg  30 mg Oral Daily Presley Barrera MD   30 mg at 12/09/24 0806    cloNIDine (CATAPRES) tablet 0.1 mg  0.1 mg Oral BID Presley Barrera MD   0.1 mg at 12/09/24 0806    furosemide (LASIX) tablet 40 mg  40 mg Oral Daily Presley Barrera MD   40 mg at 12/09/24 0807    gabapentin (NEURONTIN) capsule 100 mg  100 mg Oral Q6H PRN Nikki Caceres MD   100 mg at 11/15/24 0418    hydrocortisone (CORTAID) 0.5 % cream   Topical Daily PRN Savi Chamorro MD        lisinopril (ZESTRIL) tablet 40 mg  40 mg Oral Daily Presley Barrera MD   40  mg at 12/09/24 0806    melatonin tablet 3 mg  3 mg Oral At Bedtime Presley Barrera MD   3 mg at 12/08/24 2141    metoprolol succinate ER (TOPROL XL) 24 hr tablet 50 mg  50 mg Oral Daily Presley Barrera MD   50 mg at 12/09/24 0806    nicotine (NICODERM CQ) 14 MG/24HR 24 hr patch 1 patch  1 patch Transdermal Daily Evelia Grimm DO   1 patch at 12/09/24 0806    nicotine (NICODERM CQ) 21 MG/24HR 24 hr patch 1 patch  1 patch Transdermal Daily PRN Britt Kang MD   1 patch at 10/13/24 1611    nicotine (NICORETTE) gum 2 mg  2 mg Buccal Q1H PRN González Garcia MD   2 mg at 10/24/24 1254    OLANZapine zydis (zyPREXA) ODT tab 5 mg  5 mg Oral At Bedtime González Garcia MD   5 mg at 12/08/24 2140    Or    OLANZapine (zyPREXA) injection 10 mg  10 mg Intramuscular At Bedtime González Garcia MD        OLANZapine (zyPREXA) tablet 5 mg  5 mg Oral TID PRN Evelia Grimm DO   5 mg at 11/24/24 0436    Or    OLANZapine (zyPREXA) injection 5 mg  5 mg Intramuscular TID PRN Evelia Grimm DO        polyethylene glycol (MIRALAX) Packet 17 g  17 g Oral Daily PRN Nikki Caceres MD        traZODone (DESYREL) half-tab 25 mg  25 mg Oral At Bedtime PRN Evelia Grimm DO   25 mg at 11/29/24 0148    Or    traZODone (DESYREL) tablet 50 mg  50 mg Oral At Bedtime PRN Evelia Grimm DO         Labs and Imaging:  Data this admission:  - CBC unremarkable  - CMP unremarkable  - TSH normal  - UDS negative  - Vit D low  - Hgb A1c 5.9 (10/13/24)  - Lipids unremarkable  - Vit B12 normal  - Folate normal  - Urinalysis unremarkable  - EKG normal sinus rhythm, QTc 390 ms  - Head CT showed no acute changes  - HIV non reactive  - Treponema antibody non reactive  - ESR wnl   - Ceruloplasmin wnl   - BOOGIE negative  - Lyme negative      Parathyroid hormone:   10/13: 85     Calcium:  10/14: 11.4  10/16: 10.3  10/17: 10.3  10/19: 9.8  10/21: 10.6  10/23: 10.3     Albumin:  10/14: 4.3  10/16: 4.3  10/17: 4.1  10/19: 4.2  10/21: 4.5  10/23: 4.3      CXR:   Impression:  "  1. No definite radiographic evidence of asbestosis. If clinically  indicated, consider evaluation with high resolution chest CT.  2. Nonspecific left costophrenic blunting. Given symmetrically low  lung volumes may be related to poor inspiratory effort/timing.  3. Pulmonary vascular congestion.     MRI:   IMPRESSION:  1. No acute intracranial pathology.  2. No focal lesion or structural abnormality.   3. Symmetric frontoparietal cortical volume loss slightly more than  expected for age.     Mental Status Exam:   Oriented to: Oriented to self but not time and place  General:  Awake and Alert  Appearance:  appears stated age, Grooming is adequate, and Dressed in his own clothes  Behavior/Attitude:  Calm and Easily distracted  Eye Contact: Appropriate for conversation  Psychomotor: No evidence of tics, dystonia, or tardive dyskinesia  no catatonia present  Speech:  appropriate volume/tone  Language: Fluent in English with appropriate syntax and vocabulary.  Mood:  \"fine\"  Affect:  confused  Thought Process:  disorganized and confused  Thought Content:   did not assess SI/HI/ delusion of confusion . Patient denies paranoia  Associations:  loose  Insight:  limited   Judgment:  limited   Impulse control: limited  Attention Span:   decreased  Concentration:   distractible  Recent and Remote Memory:   remote memory intact, recent memory impaired  Fund of Knowledge: average  Muscle Strength and Tone: normal  Gait and Station: Normal     Psychiatric Assessment     Estevan Aaron is a 64 year old male with previous psychiatric diagnoses of GEORGINA admitted from the ER on 10/12/2024 due to concern for HI and psychosis in the context of medical issues (hyperthyroidism, hypercalcemia), psychosocial stressors including with recent divorce and selling his home. This is the patient's first psychiatric hospitalization. Significant symptoms on admission included delusions of persecution with grandiose beliefs, homicidal ideations, as " well as disorganized thinking and behavior. The MSE on admission was pertinent for a thought process which was perseverative, circumstantial, tangential, disorganized and tangential; with rambling and looseness of associations. Psychological contributions to presentation included lack of insight. Social factors contributing to presentation included isolation, recent divorce, selling his house, and moving from hotel to hotel. Biological contributions to presentation included a history of hyperparathyroidism with chronic hypercalcemia per charts as well as a history of methamphetamine use per collateral from ex-wife.     History was difficult to obtain due to the patient's severe disorganized thinking on interview; he was observed with persecutory delusions pertaining to the realtor and gang members, disorganized behavior as these delusions have led him to flee to different hotels to stay safe/ has called  complaining of being targeted and auditory hallucinations of voices for the past 12 months. Per collateral from ex-wife, Santos has had paranoid ideations since they first met. He has always felt like people are out to get him or trying to rip him off. She says that he has had visual hallucinations (he will point things out that the rest of the family can't see) for a long time, but the family would just go along with him to avoid making him angry. This timeline and presentation could be consistent with diagnosis of paranoid personality disorder. Things began to worsen around the beginning of the COVID pandemic, when Santos became more isolated and the family started to notice cognitive issues such as impaired memory. Immediately prior to this hospitalization, the ex-wife found Santos in a hotel room with a generator full of gasoline, binoculars, and hunting equipment. This time course does not suggest acute psychosis, however given the patient has never had a full psychiatric work-up, we completed a first-episode  psychosis work-up.      Other things that could be contributing to his presentation is hyperparathyroidism with hypercalcemia. However, patient's calcium returned to normal limits on 10/16, and patient continues to have psychosis so likely not a significant contributing factor to patient's presentation.      Patient also continues to be disoriented and his behaviors appear to worsen in the evenings. This raised concern for underlying neurocognitive disorder with delirium. Patient received MRI brain with and without contrast which showed frontal and parietal atrophy greater than would be expected for patient's age. This is consistent with neurocognitive disorder. Patient has continued to improve with decreasing his antipsychotic. Working to get patient transferred to geriatric psychiatry in the short term and to memory care long term.      Given that he currently has psychosis, patient warrants inpatient psychiatric hospitalization to maintain his safety.     Psychiatric Plan by Diagnosis   Today's changes:  None    # Major Neurocognitive Disorder  # Rule out paranoid personality disorder  1. Medications:  - Olanzapine 5 mg at bedtime  - Neurology consult 11/8/24, recommend outpatient follow-up and neuropsychiatric testing     2. Pertinent Labs/Monitoring:   - See above     3. Additional Plans:  - Patient will be treated in therapeutic milieu with appropriate individual and group therapies as described     # Unspecified anxiety vs Generalized Anxiety Disorder  - Monitor for symptoms.  - Fluoxetine held due to suspicion of ongoing manic symptoms     Psychiatric Hospital Course:      Estevan Aaron was admitted to [facility] on a 72 hour hold.   Medications:  PTA fluoxetine was held due to concern for worsening of zelalem   New medications started at the time of admission include Zyprexa.   Increased olanzapine 10 mg at bedtime was to 10 mg BID (10/14)   Increased olanzapine 10 mg BID to 10 mg during day and 15 mg at  bedtime (10/15)  10/21: increased olanzapine pm dose from 15 to 20 mg  10/23: started melatonin 3 mg at 7 pm to help patient with circadian rhythm as he has been staying up throughout the night and sleeping a lot during the day and there is some concern for delirium   10/24: consulted anesthesia for MRI brain   10/28: MRI brain complete, decrease AM olanzapine from 10 to 5 mg  10/29: Morning olanzapine decreased to 2.5 mg, evening olanzapine decreased to 15 mg   11/1: Decreased evening olanzapine to 10 mg   11/4: Decreased evening olanzapine to 7.5 mg   11/5: Decreased evening olanzapine to 5 mg   11/11: Moved morning dose of olanzapine to the afternoon as patient appears more agitated in the afternoons/evenings  11/21: Started memantine 5 mg daily   11/26: Discontinued olanzapine 2.5 mg during the afternoon  12/2:   Discontinued memantine 5 mg, daily     Care conference 10/18/24 with daughter Maria C and ex-wife Clifford  - Report that patient has always been paranoid since ex-wife first met him. She describes him always feeling like he is getting ripped off.   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 They reported once incidence in which he made a bid on a job and the client paid him, but he never ended up finishing the job  - Wife and him  officially this year, and since selling the house several months ago, patient has been moving from hotel to hotel due to thinking people are out to get him   - When they were selling their house, patient threatened realtors and felt he was being  ripped off   - Clifford reports that she started to be afraid to be around him alone  - When he was found in the hotel, he had a generator full of gasoline, binoculars, bullets, and hunting equipment.     10/21/24: updated daughter on Santos's treatment plan      10/23/24: updated daughter on Santos's treatment plan      10/25/24: updated daughter on Santos's treatment plan, including plan to try and get MRI brain done under sedation. She said that Santos's grandfather had dementia and his sister has a brain tumor.      10/28/24: updated daughter on Santos's MRI results. She is planning on coming into town soon.      Care conference 11/1/24 with daughters Maria C and Estefani and ex-wife Clifford  - Discussed dementia diagnosis   - Clifford asked about plans moving forward. Explained that applying for medical assistance is the first step. Explained that application processing time can be very variable. Informed family that Santos will most likely be staying on this inpatient unit during this time.   - Clifford expressed that their family would like to be the power of /have conservatorship for Santos.   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 Reports that he has bills that he has not paid.   - Maria C is planning to move to [place], and Clifford currently lives in [place]. Family prefer that Santos would in a facility that are near these locations. Discussed with family that they can also try to request a specific location (memory care).   - Clifford reports that he had previously gotten help applying for his social security and medicare, but unsure if it had been approved.   - Informed family that there is a geriatric unit and there might be a transfer if there becomes availability on this unit.   - Family shared that he was completely different just two months ago.   - Estefani shared that his family found his medications in his old truck recently, expressed that it was  likely that he was not taking his medications prior to his hospitalization.      11/6/24: updated Maria C on plan to try and transfer Santos to Geriatric unit.      11/11/24: updated Maria C on neurology consult      The risks, benefits, alternatives, and side effects were discussed and understood by the patient and other caregivers.     Medical Assessment and Plan     Medical diagnoses to be addressed this admission:    # Hypertension  - Continue PTA medications  Furosemide 40 mg daily  Lisinopril 40 mg daily  Metoprolol 50 mg daily  Amlodipine 10 mg daily  Clonidine 0.1 mg BID     # Hyperlipidemia  - Continue PTA Atorvastatin 40 mg     # Hyperparathyroidism  # Hypercalcemia, hypophosphoremia   Increased Ca level to 10.9 and decreased Ph level to 2.3 in the ED. Suspicion patient's hypercalcemia could be contributing to symptoms of psychosis.  - Consulted endocrinology, who started cinacalcet 30 mg BID on 10/13  - 10/16 endocrinology recommended continuing to trend calcium and albumin to make sure patient does not become hypocalcemic and recommended holding cinacalcet   - 10/17, calcium and albumin wnl, endo recommended cinacalcet 30 mg once daily   - 10/21, per endo continue cincaclcet and recheck calcium and albumin on October 24  - 10/23: per endo, patient needs to follow up outpatient for further management of hyperparathyroidism      Medical course: Patient was physically examined by the ED prior to being transferred to the unit and was found to be medically stable and appropriate for admission.      Consults: Psychiatry, Endocrinology (follow-up of hyperthyroidism / hypercalcemia and hypertension), neurology     Checklist     Legal Status: Committed     Safety Assessment:   Behavioral Orders   Procedures    Assault precautions    Code 1 - Restrict to Unit    Routine Programming     As clinically indicated    Status 15     Every 15 minutes.    Status Individual Observation     1:1 during evening shift, & night  shift.    If patient refuses overnight presence of staff in his room, it's ok to do 15 min checks through the window blinds.    Patient SIO status reviewed with team/RN.  Please also refer to RN/team documentation for add'l detail.    -SIO staff to monitor following which have contributed to patient being on SIO:  Pt has psychosis regarding being followed and attacked, very paranoid, HI    -Possible interventions SIO staff could use to support patient's treatment progress:  Redirect and reassure  -When following observed, team will review discontinuation of SIO:  Psychosis improves     Order Specific Question:   CONTINUOUS 24 hours / day     Answer:   Other     Order Specific Question:   Specify distance     Answer:   10 feet, 5 feet when close to the doors     Order Specific Question:   Indications for SIO     Answer:   Assault risk     Order Specific Question:   Indications for SIO     Answer:   Severe intrusiveness       Risk Assessment:  Risk for harm is elevated.  Risk factors: impulsive and past behaviors  Protective factors: family      Disposition: Pending stabilization, medication optimization, & development of a safe discharge plan.     Attestations   Resident without student: This patient was seen and discussed with my attending physician.  [facility]  Psychiatry Resident Physician    Attestation:  This patient has been seen and evaluated by me, Pete Smith MD.  I have discussed this patient with the house staff team including the resident and/or medical student and I agree with the findings and plan in this note.    I have reviewed today's vital signs, medications, labs and imaging. Pete Smith MD , PhD.
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
## 2024-12-09 NOTE — PLAN OF CARE
Pt has been intermittently visible in the milieu, selectively social with peers. He did not attend any groups today. Irritable at times but no significant behavioral concerns. Pt denies MH sx. Med compliant, no PRNs. No other concerns at this time.    Problem: Psychotic Signs/Symptoms  Goal: Improved Behavioral Control (Psychotic Signs/Symptoms)  Outcome: Progressing   Goal Outcome Evaluation:    Plan of Care Reviewed With: patient

## 2024-12-09 NOTE — PLAN OF CARE
Problem: Psychotic Signs/Symptoms  Goal: Improved Behavioral Control (Psychotic Signs/Symptoms)  Outcome: Progressing  Intervention: Manage Behavior  Recent Flowsheet Documentation  Taken 12/9/2024 1627 by Tamie Fraser RN  De-Escalation Techniques: verbally redirected     Problem: Anxiety  Goal: Anxiety Reduction or Resolution  Outcome: Progressing     Problem: Manic or Hypomanic Signs/Symptoms  Goal: Improved Impulse Control (Manic/Hypomanic Signs/Symptoms)  Outcome: Progressing  Intervention: Promote Behavior and Impulse Control  Recent Flowsheet Documentation  Taken 12/9/2024 1627 by Tamie Fraser RN  De-Escalation Techniques: verbally redirected  Diversional Activity: television   Goal Outcome Evaluation:     Pt was visible in the milieu watching TV majority of the shift Affect labile. Mood calm. Pt was pleasant upon approach. He was observed pacing the hallway intermittently. He denied any physical pain or discomfort. He denied all mental health and psych symptoms.Po intake adequate. No PRN administered. No safety concerns. Pt was cooperative and compliant with medications. No behavioral issues this shift.

## 2024-12-09 NOTE — PLAN OF CARE
Problem: Sleep Disturbance  Goal: Adequate Sleep/Rest  Outcome: Progressing   Goal Outcome Evaluation:    Patient appears to have slept a total of 5.5 hours.   Attempted to enter another patient's room and was redirected.     Safety/environment checks conducted every 15 minutes with no further concerns noted. No complaints of pain/discomfort.

## 2024-12-09 NOTE — PLAN OF CARE
Team Note Due:  Monday    Assessment/Intervention/Current Symtoms and Care Coordination:  Chart review and met with team, discussed pt progress, symptomology, and response to treatment.  Discussed the discharge plan and any potential impediments to discharge. Clifford Aaron is currently emergency guardian/conservator until 01/20/2025.    Clifford provided update she will follow up with Jessica Dean and request they coordinate with writer to schedule meeting with hospital. She is also following up with another facility in [place] to schedule a tour.    Discharge Plan or Goal:  Memory care facility     Barriers to Discharge:  Patient requires further psychiatric stabilization due to current symptomology, medication management with changes subject to provider, coordination with outside supports, and aftercare planning. Pt is under civil commitment.     Referral Status:    Memory Care facilities currently in process:  [facility]. Tour completed 11/27.  [facility]. Tour completed 11/29. Records sent 12/2/24.  Lorraine (): [email or web address]; [phone]  Isatu (director of nursing): [email or web address]  [facility]. Tour completed 11/29.  [facility]. Tour completed 11/30.    Memory Care facilities pending family review:  [facility] [place].   [facility].  [facility] [place].  [facility].  [facility].  [facility].  [facility].    Memory Care facilities declined:  [facility] (private pay only, no EW).  [facility] (private pay only, no EW).  [facility] (no openings).     Legal Status:  MI Commitment with [facility]  File Number: [id number]  Start and expiration date of commitment: 10/24/24 - 04/24/25    Jordan  meds: Haldol, Clozaril, Risperdal, Invega, Zyprexa, Seroquel, Abilify    PPS/CM:  Shelby Chowdary: [phone]  [email or web address].    Contacts:  Maria C Aaron (Daughter): [phone]   Clifford Aaron (ex-wife): [phone]     No Del Angel (guardianship/conservatorship ): [phone]  [email or web address]     Upcoming Meetings and Dates/Important Information and next steps:  Follow up with family regarding list of Memory Care facilities  Discharge planning when appropriate  Pt does not qualify for MA per financial counselor on 11/8/2024. Pt will likely privately pay for Memory Care until he qualifies for MA/waivers in the future.  Pt will need to apply for MA before a [employer] Assessment/SMRT can be completed for waivers.    Provisional Discharge and Change of Status needed at discharge

## 2024-12-09 NOTE — PLAN OF CARE
BEH IP Unit Acuity Rating Score (UARS)  Patient is given one point for every criteria they meet.    CRITERIA SCORING   On a 72 hour hold, court hold, committed, stay of commitment, or revocation. 1    Patient LOS on BEH unit exceeds 20 days. 1  LOS: 58   Patient under guardianship, 55+, otherwise medically complex, or under age 11. 1   Suicide ideation without relief of precipitating factors. 0   Current plan for suicide. 0   Current plan for homicide. 0   Imminent risk or actual attempt to seriously harm another without relief of factors precipitating the attempt. 1   Severe dysfunction in daily living (ex: complete neglect for self care, extreme disruption in vegetative function, extreme deterioration in social interactions). 1   Recent (last 7 days) or current physical aggression in the ED or on unit. 0   Restraints or seclusion episode in past 72 hours. 0   Recent (last 7 days) or current verbal aggression, agitation, yelling, etc., while in the ED or unit. 1 - last 12/3   Active psychosis. 1   Need for constant or near constant redirection (from leaving, from others, etc).  0   Intrusive or disruptive behaviors. 1   Patient requires 3 or more hours of individualized nursing care per 8-hour shift (i.e. for ADLs, meds, therapeutic interventions). 1   TOTAL 9

## 2024-12-10 PROCEDURE — 250N000013 HC RX MED GY IP 250 OP 250 PS 637

## 2024-12-10 PROCEDURE — 99231 SBSQ HOSP IP/OBS SF/LOW 25: CPT | Mod: GC | Performed by: PSYCHIATRY & NEUROLOGY

## 2024-12-10 PROCEDURE — 97150 GROUP THERAPEUTIC PROCEDURES: CPT | Mod: GO

## 2024-12-10 PROCEDURE — 124N000002 HC R&B MH UMMC

## 2024-12-10 RX ADMIN — Medication 3 MG: at 20:41

## 2024-12-10 RX ADMIN — Medication 1250 MCG: at 12:33

## 2024-12-10 RX ADMIN — OLANZAPINE 5 MG: 5 TABLET, ORALLY DISINTEGRATING ORAL at 20:41

## 2024-12-10 RX ADMIN — CLONIDINE HYDROCHLORIDE 0.1 MG: 0.1 TABLET ORAL at 20:41

## 2024-12-10 RX ADMIN — CINACALCET 30 MG: 30 TABLET ORAL at 08:13

## 2024-12-10 RX ADMIN — Medication 1 PATCH: at 09:26

## 2024-12-10 RX ADMIN — LISINOPRIL 40 MG: 10 TABLET ORAL at 09:36

## 2024-12-10 RX ADMIN — CLONIDINE HYDROCHLORIDE 0.1 MG: 0.1 TABLET ORAL at 09:36

## 2024-12-10 RX ADMIN — ATORVASTATIN CALCIUM 40 MG: 20 TABLET, FILM COATED ORAL at 08:13

## 2024-12-10 RX ADMIN — AMLODIPINE BESYLATE 10 MG: 5 TABLET ORAL at 09:36

## 2024-12-10 ASSESSMENT — ACTIVITIES OF DAILY LIVING (ADL)
ORAL_HYGIENE: INDEPENDENT
HYGIENE/GROOMING: INDEPENDENT;PROMPTS
ADLS_ACUITY_SCORE: 64
ADLS_ACUITY_SCORE: 54
DRESS: INDEPENDENT;STREET CLOTHES
ADLS_ACUITY_SCORE: 54
ADLS_ACUITY_SCORE: 64
ADLS_ACUITY_SCORE: 54
ADLS_ACUITY_SCORE: 64
ADLS_ACUITY_SCORE: 64
ADLS_ACUITY_SCORE: 54
LAUNDRY: UNABLE TO COMPLETE
ADLS_ACUITY_SCORE: 54
ADLS_ACUITY_SCORE: 64
LAUNDRY: UNABLE TO COMPLETE
ADLS_ACUITY_SCORE: 54
ADLS_ACUITY_SCORE: 54
ADLS_ACUITY_SCORE: 64
HYGIENE/GROOMING: INDEPENDENT
ADLS_ACUITY_SCORE: 54
DRESS: INDEPENDENT
ADLS_ACUITY_SCORE: 64
ADLS_ACUITY_SCORE: 54
ADLS_ACUITY_SCORE: 64
ADLS_ACUITY_SCORE: 54
ORAL_HYGIENE: INDEPENDENT;PROMPTS
ADLS_ACUITY_SCORE: 54
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
## 2024-12-10 NOTE — PLAN OF CARE
Santos spend most of the time in the in the lounge watching tv with others. Presents with flat affect. Pt thoughts are disorganized. Pt was calm most of the time. Pt was getting agitated about changing of tv channel and writer was able to redirect pt and he calmed down. Pt talks about random things and jumps from one topic to another. Denies for anxiety and depression. Compliant with medications. Food and fluid intake is adequate. No prns administered. Vital signs:  Temp: 97.7  F (36.5  C) Temp src: Oral BP: 128/80 Pulse: 73     SpO2: 93 % O2 Device: None (Room air)     Goal Outcome Evaluation:    Plan of Care Reviewed With: patient      Problem: Psychotic Signs/Symptoms  Goal: Improved Behavioral Control (Psychotic Signs/Symptoms)  Outcome: Progressing  Intervention: Manage Behavior  Recent Flowsheet Documentation  Taken 12/10/2024 1709 by Noah Murray, RN  De-Escalation Techniques: verbally redirected     Problem: Psychotic Signs/Symptoms  Goal: Improved Mood Symptoms (Psychotic Signs/Symptoms)  Intervention: Optimize Emotion and Mood  Recent Flowsheet Documentation  Taken 12/10/2024 1709 by Noah Murray RN  Diversional Activity: television     Problem: Depression  Goal: Improved Mood  Outcome: Progressing     Problem: Anxiety  Goal: Anxiety Reduction or Resolution  Outcome: Progressing

## 2024-12-10 NOTE — PROGRESS NOTES
"  ----------------------------------------------------------------------------------------------------------  [facility]  Psychiatry Progress Note  Hospital Day #59     Interim History:     The patient's care was discussed with the treatment team and chart notes were reviewed.    Vitals: VSS  Sleep: 5.25 hours (12/10/24 0639)  Scheduled medications: Took all scheduled medications as prescribed  Psychiatric PRN medications:   None  Staff Report:   - Remains on SIO 1:1 as per orders for assault risk and severe intrusiveness, no behavioral issues this shift.   -  Patient appears to have slept for 5.25 hours.   - Pt was visible in the milieu watching TV majority of the shift Affect labile.   - Mood calm. Pt was pleasant upon approach.  - He denied any physical pain or discomfort.    - He denied all mental health and psych symptoms   - Please see staff notes for details.      Subjective:     Patient Interview:  We interviewed Santos in [facility] care team room. Pt states he removed shower door and put it against the wall, and as he did that, the [employer] won, speaking of the football game last night.     Feels he slept well last night, \"amazing.\"     We updated Santos that we are working on paperwork with regard to new housing after he leaves here. Pt states he is not able to do paperwork, we clarified that we are helping with the paperwork. Pt is wondering what kind of living environment he can go to. 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     Acknowledges might be nice to live somewhere where he has some technology help.     Denies chest pain.     Endorses he is a \"puffy marina,\"  feels he is cold in the \"art studio.\"  Has been enjoying artwork.     ROS:  See above     Objective:     Vitals:  /72   Pulse 59   Temp 97.4  F (36.3  C) (Oral)   Resp 17   Wt 109.6 kg (241 lb 9.6 oz)   SpO2 97%   BMI 39.00 " kg/m      Allergies:  No Known Allergies    Current Medications:  Scheduled:  Current Facility-Administered Medications   Medication Dose Route Frequency Provider Last Rate Last Admin    acetaminophen (TYLENOL) tablet 650 mg  650 mg Oral Q4H PRN Nikki Caceres MD   650 mg at 11/30/24 1133    alum & mag hydroxide-simethicone (MAALOX) suspension 30 mL  30 mL Oral Q4H PRN Nikki Caceres MD   30 mL at 10/17/24 0837    amLODIPine (NORVASC) tablet 10 mg  10 mg Oral Daily Presley Barrera MD   10 mg at 12/09/24 0806    atorvastatin (LIPITOR) tablet 40 mg  40 mg Oral Daily Nikki Caceres MD   40 mg at 12/10/24 0813    cholecalciferol (VITAMIN D3) capsule 1,250 mcg  1,250 mcg Oral Q7 Days Evelia Grimm DO   1,250 mcg at 12/03/24 0943    cinacalcet (SENSIPAR) tablet 30 mg  30 mg Oral Daily Presley Barrera MD   30 mg at 12/10/24 0813    cloNIDine (CATAPRES) tablet 0.1 mg  0.1 mg Oral BID Presley Barrera MD   0.1 mg at 12/09/24 2115    furosemide (LASIX) tablet 40 mg  40 mg Oral Daily Presley Barrera MD   40 mg at 12/09/24 0807    gabapentin (NEURONTIN) capsule 100 mg  100 mg Oral Q6H PRN Nikki Caceres MD   100 mg at 11/15/24 0418    hydrocortisone (CORTAID) 0.5 % cream   Topical Daily PRN Savi Chamorro MD        lisinopril (ZESTRIL) tablet 40 mg  40 mg Oral Daily Presley Barrera MD   40 mg at 12/09/24 0806    melatonin tablet 3 mg  3 mg Oral At Bedtime Presley Barrera MD   3 mg at 12/09/24 2114    metoprolol succinate ER (TOPROL XL) 24 hr tablet 50 mg  50 mg Oral Daily Presley Barrera MD   50 mg at 12/09/24 0806    nicotine (NICODERM CQ) 14 MG/24HR 24 hr patch 1 patch  1 patch Transdermal Daily Evelia Grimm DO   1 patch at 12/09/24 0806    nicotine (NICORETTE) gum 2 mg  2 mg Buccal Q1H PRN González Garcia MD   2 mg at 10/24/24 1254    OLANZapine zydis (zyPREXA) ODT tab 5 mg  5 mg Oral At Bedtime González Garcia MD   5 mg at 12/09/24 2115    Or    OLANZapine (zyPREXA) injection 10 mg  10 mg  Intramuscular At Bedtime González Garcia MD        OLANZapine (zyPREXA) tablet 5 mg  5 mg Oral TID PRN Evelia Grimm DO   5 mg at 11/24/24 0436    Or    OLANZapine (zyPREXA) injection 5 mg  5 mg Intramuscular TID PRN Evelia Grimm DO        polyethylene glycol (MIRALAX) Packet 17 g  17 g Oral Daily PRN Nikki Caceres MD        traZODone (DESYREL) half-tab 25 mg  25 mg Oral At Bedtime PRN Evelia Grimm DO   25 mg at 11/29/24 0148    Or    traZODone (DESYREL) tablet 50 mg  50 mg Oral At Bedtime PRN Evelia Grimm DO           PRN:  Current Facility-Administered Medications   Medication Dose Route Frequency Provider Last Rate Last Admin    acetaminophen (TYLENOL) tablet 650 mg  650 mg Oral Q4H PRN Nikki Caceres MD   650 mg at 11/30/24 1133    alum & mag hydroxide-simethicone (MAALOX) suspension 30 mL  30 mL Oral Q4H PRN Nikki Caecres MD   30 mL at 10/17/24 0837    amLODIPine (NORVASC) tablet 10 mg  10 mg Oral Daily Presley Barrera MD   10 mg at 12/09/24 0806    atorvastatin (LIPITOR) tablet 40 mg  40 mg Oral Daily Nikki Caceres MD   40 mg at 12/10/24 0813    cholecalciferol (VITAMIN D3) capsule 1,250 mcg  1,250 mcg Oral Q7 Days Evelia Grimm DO   1,250 mcg at 12/03/24 0943    cinacalcet (SENSIPAR) tablet 30 mg  30 mg Oral Daily Presley Barrera MD   30 mg at 12/10/24 0813    cloNIDine (CATAPRES) tablet 0.1 mg  0.1 mg Oral BID Presley Barrera MD   0.1 mg at 12/09/24 2115    furosemide (LASIX) tablet 40 mg  40 mg Oral Daily Presley Barrera MD   40 mg at 12/09/24 0807    gabapentin (NEURONTIN) capsule 100 mg  100 mg Oral Q6H PRN Nikki Caceres MD   100 mg at 11/15/24 0418    hydrocortisone (CORTAID) 0.5 % cream   Topical Daily PRN Savi Chamorro MD        lisinopril (ZESTRIL) tablet 40 mg  40 mg Oral Daily Presley Barrera MD   40 mg at 12/09/24 0806    melatonin tablet 3 mg  3 mg Oral At Bedtime Presley Barrera MD   3 mg at 12/09/24 2114    metoprolol succinate ER (TOPROL XL) 24 hr tablet 50 mg   50 mg Oral Daily Presley Barrera MD   50 mg at 12/09/24 0806    nicotine (NICODERM CQ) 14 MG/24HR 24 hr patch 1 patch  1 patch Transdermal Daily Evelia Grimm DO   1 patch at 12/09/24 0806    nicotine (NICORETTE) gum 2 mg  2 mg Buccal Q1H PRN González Garcia MD   2 mg at 10/24/24 1254    OLANZapine zydis (zyPREXA) ODT tab 5 mg  5 mg Oral At Bedtime González Garcia MD   5 mg at 12/09/24 2115    Or    OLANZapine (zyPREXA) injection 10 mg  10 mg Intramuscular At Bedtime González Garcia MD        OLANZapine (zyPREXA) tablet 5 mg  5 mg Oral TID PRN Evelia Grimm DO   5 mg at 11/24/24 0436    Or    OLANZapine (zyPREXA) injection 5 mg  5 mg Intramuscular TID PRN Evelia Grimm DO        polyethylene glycol (MIRALAX) Packet 17 g  17 g Oral Daily PRN Nikki Caceres MD        traZODone (DESYREL) half-tab 25 mg  25 mg Oral At Bedtime PRN Evelia Grimm DO   25 mg at 11/29/24 0148    Or    traZODone (DESYREL) tablet 50 mg  50 mg Oral At Bedtime PRN Evelia Grimm DO         Labs and Imaging:  Data this admission:  - CBC unremarkable  - CMP unremarkable  - TSH normal  - UDS negative  - Vit D low  - Hgb A1c 5.9 (10/13/24)  - Lipids unremarkable  - Vit B12 normal  - Folate normal  - Urinalysis unremarkable  - EKG normal sinus rhythm, QTc 390 ms  - Head CT showed no acute changes  - HIV non reactive  - Treponema antibody non reactive  - ESR wnl   - Ceruloplasmin wnl   - BOOGIE negative  - Lyme negative      Parathyroid hormone:   10/13: 85     Calcium:  10/14: 11.4  10/16: 10.3  10/17: 10.3  10/19: 9.8  10/21: 10.6  10/23: 10.3     Albumin:  10/14: 4.3  10/16: 4.3  10/17: 4.1  10/19: 4.2  10/21: 4.5  10/23: 4.3      CXR:   Impression:   1. No definite radiographic evidence of asbestosis. If clinically  indicated, consider evaluation with high resolution chest CT.  2. Nonspecific left costophrenic blunting. Given symmetrically low  lung volumes may be related to poor inspiratory effort/timing.  3. Pulmonary vascular congestion.    "  MRI:   IMPRESSION:  1. No acute intracranial pathology.  2. No focal lesion or structural abnormality.   3. Symmetric frontoparietal cortical volume loss slightly more than  expected for age.     Mental Status Exam:   Oriented to: Oriented to self but not time and place  General:  Awake and Alert  Appearance:  appears stated age, Grooming is adequate, and Dressed in his own clothes  Behavior/Attitude:  Calm and Easily distracted  Eye Contact: Appropriate for conversation  Psychomotor: No evidence of tics, dystonia, or tardive dyskinesia  no catatonia present  Speech:  appropriate volume/tone  Language: Fluent in English with appropriate syntax and vocabulary.  Mood:  \"good\"  Affect:  confused  Thought Process:  disorganized and confused  Thought Content:   did not assess SI/HI/ delusion of confusion . Patient denies paranoia  Associations:  loose  Insight:  limited   Judgment:  limited   Impulse control: limited  Attention Span:   decreased  Concentration:   distractible  Recent and Remote Memory:   remote memory intact, recent memory impaired  Fund of Knowledge: average  Muscle Strength and Tone: normal  Gait and Station: Normal     Psychiatric Assessment     Estevan Aaron is a 64 year old male with previous psychiatric diagnoses of GEORGINA admitted from the ER on 10/12/2024 due to concern for HI and psychosis in the context of medical issues (hyperthyroidism, hypercalcemia), psychosocial stressors including with recent divorce and selling his home. This is the patient's first psychiatric hospitalization. Significant symptoms on admission included delusions of persecution with grandiose beliefs, homicidal ideations, as well as disorganized thinking and behavior. The MSE on admission was pertinent for a thought process which was perseverative, circumstantial, tangential, disorganized and tangential; with rambling and looseness of associations. Psychological contributions to presentation included lack of insight. " Social factors contributing to presentation included isolation, recent divorce, selling his house, and moving from hotel to hotel. Biological contributions to presentation included a history of hyperparathyroidism with chronic hypercalcemia per charts as well as a history of methamphetamine use per collateral from ex-wife.     History was difficult to obtain due to the patient's severe disorganized thinking on interview; he was observed with persecutory delusions pertaining to the realtor and gang members, disorganized behavior as these delusions have led him to flee to different hotels to stay safe/ has called  complaining of being targeted and auditory hallucinations of voices for the past 12 months. Per collateral from ex-wife, Santos has had paranoid ideations since they first met. He has always felt like people are out to get him or trying to rip him off. She says that he has had visual hallucinations (he will point things out that the rest of the family can't see) for a long time, but the family would just go along with him to avoid making him angry. This timeline and presentation could be consistent with diagnosis of paranoid personality disorder. Things began to worsen around the beginning of the COVID pandemic, when Santos became more isolated and the family started to notice cognitive issues such as impaired memory. Immediately prior to this hospitalization, the ex-wife found Santos in a hotel room with a generator full of gasoline, binoculars, and hunting equipment. This time course does not suggest acute psychosis, however given the patient has never had a full psychiatric work-up, we completed a first-episode psychosis work-up.      Other things that could be contributing to his presentation is hyperparathyroidism with hypercalcemia. However, patient's calcium returned to normal limits on 10/16, and patient continues to have psychosis so likely not a significant contributing factor to patient's presentation.       Patient also continues to be disoriented and his behaviors appear to worsen in the evenings. This raised concern for underlying neurocognitive disorder with delirium. Patient received MRI brain with and without contrast which showed frontal and parietal atrophy greater than would be expected for patient's age. This is consistent with neurocognitive disorder. Patient has continued to improve with decreasing his antipsychotic. Working to get patient transferred to geriatric psychiatry in the short term and to memory care long term.      Given that he currently has psychosis, patient warrants inpatient psychiatric hospitalization to maintain his safety.     Psychiatric Plan by Diagnosis   Today's changes:  None    # Major Neurocognitive Disorder  # Rule out paranoid personality disorder  1. Medications:  - Olanzapine 5 mg at bedtime  - Neurology consult 11/8/24, recommend outpatient follow-up and neuropsychiatric testing     2. Pertinent Labs/Monitoring:   - See above     3. Additional Plans:  - Patient will be treated in therapeutic milieu with appropriate individual and group therapies as described     # Unspecified anxiety vs Generalized Anxiety Disorder  - Monitor for symptoms.  - Fluoxetine held due to suspicion of ongoing manic symptoms     Psychiatric Hospital Course:      Estevan Aaron was admitted to [facility] on a 72 hour hold.   Medications:  PTA fluoxetine was held due to concern for worsening of zelalem   New medications started at the time of admission include Zyprexa.   Increased olanzapine 10 mg at bedtime was to 10 mg BID (10/14)   Increased olanzapine 10 mg BID to 10 mg during day and 15 mg at bedtime (10/15)  10/21: increased olanzapine pm dose from 15 to 20 mg  10/23: started melatonin 3 mg at 7 pm to help patient with circadian rhythm as he has been staying up throughout the night and sleeping a lot during the day and there is some concern for delirium   10/24: consulted anesthesia for MRI  brain   10/28: MRI brain complete, decrease AM olanzapine from 10 to 5 mg  10/29: Morning olanzapine decreased to 2.5 mg, evening olanzapine decreased to 15 mg   11/1: Decreased evening olanzapine to 10 mg   11/4: Decreased evening olanzapine to 7.5 mg   11/5: Decreased evening olanzapine to 5 mg   11/11: Moved morning dose of olanzapine to the afternoon as patient appears more agitated in the afternoons/evenings  11/21: Started memantine 5 mg daily   11/26: Discontinued olanzapine 2.5 mg during the afternoon  12/2:   Discontinued memantine 5 mg, daily     Care conference 10/18/24 with daughter Maria C and ex-wife Clifford  - Report that patient has always been paranoid since ex-wife first met him. She describes him always feeling like he is getting ripped off.   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 They reported once incidence in which he made a bid on a job and the client paid him, but he never ended up finishing the job  - Wife and him  officially this year, and since selling the house several months ago, patient has been moving from hotel to hotel due to thinking people are out to get him   - When they were selling their house, patient threatened realtors and felt he was being ripped off   - Clifford reports that she started to be afraid to be around him alone  - When he was found in the hotel, he had a generator full of gasoline, binoculars, bullets, and hunting equipment.     10/21/24: updated daughter on Santos's treatment plan      10/23/24: updated daughter on Santos's treatment  plan      10/25/24: updated daughter on Santos's treatment plan, including plan to try and get MRI brain done under sedation. She said that Santos's grandfather had dementia and his sister has a brain tumor.      10/28/24: updated daughter on Santos's MRI results. She is planning on coming into town soon.      Care conference 11/1/24 with daughters Maria C and Estefani and ex-wife Clifford  - Discussed dementia diagnosis   - Clifford asked about plans moving forward. Explained that applying for medical assistance is the first step. Explained that application processing time can be very variable. Informed family that Santos will most likely be staying on this inpatient unit during this time.   - Clifford expressed that their family would like to be the power of /have conservatorship for Santos.   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 Reports that he has bills that he has not paid.   - Maria C is planning to move to [place], and Clifford currently lives in [place]. Family prefer that Santos would in a facility that are near these locations. Discussed with family that they can also try to request a specific location (memory care).   - Clifford reports that he had previously gotten help applying for his social security and medicare, but unsure if it had been approved.   - Informed family that there is a geriatric unit and there might be a transfer if there becomes availability on this unit.   - Family shared that he was completely different just two months ago.   - Estefani shared that his family found his medications in his old truck recently, expressed that it was likely that he was not taking his medications prior to his hospitalization.      11/6/24: updated Maria C on plan to try and transfer Santos to Geriatric unit.      11/11/24: updated Maria C on neurology consult      The risks, benefits, alternatives, and side effects were discussed and understood by the patient and  other caregivers.     Medical Assessment and Plan     Medical diagnoses to be addressed this admission:    # Hypertension  - Continue PTA medications  Furosemide 40 mg daily  Lisinopril 40 mg daily  Metoprolol 50 mg daily  Amlodipine 10 mg daily  Clonidine 0.1 mg BID     # Hyperlipidemia  - Continue PTA Atorvastatin 40 mg     # Hyperparathyroidism  # Hypercalcemia, hypophosphoremia   Increased Ca level to 10.9 and decreased Ph level to 2.3 in the ED. Suspicion patient's hypercalcemia could be contributing to symptoms of psychosis.  - Consulted endocrinology, who started cinacalcet 30 mg BID on 10/13  - 10/16 endocrinology recommended continuing to trend calcium and albumin to make sure patient does not become hypocalcemic and recommended holding cinacalcet   - 10/17, calcium and albumin wnl, endo recommended cinacalcet 30 mg once daily   - 10/21, per endo continue cincaclcet and recheck calcium and albumin on October 24  - 10/23: per endo, patient needs to follow up outpatient for further management of hyperparathyroidism      Medical course: Patient was physically examined by the ED prior to being transferred to the unit and was found to be medically stable and appropriate for admission.      Consults: Psychiatry, Endocrinology (follow-up of hyperthyroidism / hypercalcemia and hypertension), neurology     Checklist     Legal Status: Committed     Safety Assessment:   Behavioral Orders   Procedures    Assault precautions    Code 1 - Restrict to Unit    Routine Programming     As clinically indicated    Status 15     Every 15 minutes.    Status Individual Observation     1:1 during evening shift, & night shift.    If patient refuses overnight presence of staff in his room, it's ok to do 15 min checks through the window blinds.    Patient SIO status reviewed with team/RN.  Please also refer to RN/team documentation for add'l detail.    -SIO staff to monitor following which have contributed to patient being on SIO:  Pt  has psychosis regarding being followed and attacked, very paranoid, HI    -Possible interventions SIO staff could use to support patient's treatment progress:  Redirect and reassure  -When following observed, team will review discontinuation of SIO:  Psychosis improves     Order Specific Question:   CONTINUOUS 24 hours / day     Answer:   Other     Order Specific Question:   Specify distance     Answer:   10 feet, 5 feet when close to the doors     Order Specific Question:   Indications for SIO     Answer:   Assault risk     Order Specific Question:   Indications for SIO     Answer:   Severe intrusiveness       Risk Assessment:  Risk for harm is elevated.  Risk factors: impulsive and past behaviors  Protective factors: family      Disposition: Pending stabilization, medication optimization, & development of a safe discharge plan.     Attestations   Resident without student: This patient was seen and discussed with my attending physician.  [facility]  Psychiatry Resident Physician    Attestation:  This patient has been seen and evaluated by me, Pete Smith MD.  I have discussed this patient with the house staff team including the resident and/or medical student and I agree with the findings and plan in this note.    I have reviewed today's vital signs, medications, labs and imaging. Pete Smith MD , PhD.

## 2024-12-10 NOTE — PLAN OF CARE
Team Note Due:  Monday    Assessment/Intervention/Current Symtoms and Care Coordination:  Chart review and met with team, discussed pt progress, symptomology, and response to treatment.  Discussed the discharge plan and any potential impediments to discharge. Clifford Aaron is currently emergency guardian/conservator until 01/20/2025.    Received call from [employer] to schedule MNChoice Assessment for pt. Scheduled for 12/31 at 10:00am. Was given contact information for scheduling team if pt discharges or if this needs to be rescheduled for any reason. Updated family on this being scheduled.    Received message from Lorraine at [facility] requesting medications and H&P Cares from this week. Provided MAR and recent RN notes, specifying pt is living independently and there is no current assistance with cares.    Discharge Plan or Goal:  Memory care facility     Barriers to Discharge:  Patient requires further psychiatric stabilization due to current symptomology, medication management with changes subject to provider, coordination with outside supports, and aftercare planning. Pt is under civil commitment.     Referral Status:    Memory Care facilities currently in process:  [facility]. Tour completed 11/27.  [facility]. Tour completed 11/29. Records sent 12/2/24.  Lorraine (): [email or web address]; [phone]  Isatu (director of nursing): [email or web address]  [facility] [place]. Tour completed 11/29.  [facility]. Tour completed 11/30.    Memory Care facilities pending family review:  [facility].   [facility].  [facility] [place].  [facility] [place].  [facility].  [facility].  [facility].    Memory Care facilities declined:  [facility] (private pay only, no  EW).  [facility] (private pay only, no EW).  [facility] [place] (no openings).     Legal Status:  MI Commitment with [facility]: [place]  File Number: [id number]  Start and expiration date of commitment: 10/24/24 - 04/24/25    [place] meds: Haldol, Clozaril, Risperdal, Invega, Zyprexa, Seroquel, Abilify    PPS/CM:  Shelby Chowdary: [phone]  [email or web address]    Contacts:  Maria C Aaron (Daughter): [phone]   Clifford Agnieszka (ex-wife): [phone]     No Del Angel (guardianship/conservatorship ): [phone]  [email or web address]     Upcoming Meetings and Dates/Important Information and next steps:  Follow up with family regarding list of Memory Care facilities  Discharge planning when appropriate  Pt does not qualify for MA per financial counselor on 11/8/2024. Pt will likely privately pay for Memory Care until he qualifies for MA/waivers in the future.  MNChoice Assessment scheduled on 12/31 at 10:00am with Amanda Beard.  Call scheduling team if pt is discharged or if this needs to be rescheduled: [phone]     Provisional Discharge and Change of Status needed at discharge

## 2024-12-10 NOTE — PROGRESS NOTES
Rehab Group    Start time: 1015  End time: 1200  Patient time total: 80 minutes    attended full group    #5 attended   Group Type: occupational therapy   Group Topic Covered: coping skills     Group Session Detail:  OT clinic     Patient Response/Contribution:  cooperative with task, attentive, and actively engaged     Patient Detail:    Pt actively participated in occupational therapy clinic to facilitate coping skill exploration, creative expression within personally meaningful activities, and clinical observation of social, cognitive, and kinesthetic performance skills. Pt response: Upon arrival to group, he needed one verbal cue to locate task materials, and was subsequently independent to initiate, gather materials, sequence, and adjust to workspace demands as needed. Demonstrated good focus, planning, and attention to detail for selected creative expression task. Able to ask for assistance as needed, and occasionally socialized with peers and staff, though spent a majority of this group quietly focused on his task. He was pleasantly dismissive when offered compliments on his task and his creative skills. He unintentionally took task materials out of the group room when he was taking a brief break from group, though readily returned materials. Calm, pleasant, and engaged.      [id number] OT Group (2 or more in attendance)      Patient Active Problem List   Diagnosis    Hyperlipidemia LDL goal <130    Hypertension goal BP (blood pressure) < 130/80    Hypercalcemia    Hyperparathyroidism (H)    Thalassemia, unspecified type    Psychosis, unspecified psychosis type (H)    Acute psychosis (H)

## 2024-12-10 NOTE — PLAN OF CARE
Problem: Sleep Disturbance  Goal: Adequate Sleep/Rest  Outcome: Progressing   Goal Outcome Evaluation:  Patient had no new concerns during the night. Awake intermittently during the shift. No complaints or noted signs of pain. No requested or administered prn's. Remains on SIO 1:1 as per orders for assault risk and severe intrusiveness, no behavioral issues this shift. Patient appears to have slept for 5.25 hours.

## 2024-12-10 NOTE — PLAN OF CARE
Individual Therapy Note    Pt presented to process group requesting access to OT cabinet, writer informed Pt that writer does not have the key and invited Pt to join for process group. Pt declined.

## 2024-12-10 NOTE — PLAN OF CARE
BEH IP Unit Acuity Rating Score (UARS)  Patient is given one point for every criteria they meet.    CRITERIA SCORING   On a 72 hour hold, court hold, committed, stay of commitment, or revocation. 1    Patient LOS on BEH unit exceeds 20 days. 1  LOS: 59   Patient under guardianship, 55+, otherwise medically complex, or under age 11. 1   Suicide ideation without relief of precipitating factors. 0   Current plan for suicide. 0   Current plan for homicide. 0   Imminent risk or actual attempt to seriously harm another without relief of factors precipitating the attempt. 1   Severe dysfunction in daily living (ex: complete neglect for self care, extreme disruption in vegetative function, extreme deterioration in social interactions). 1   Recent (last 7 days) or current physical aggression in the ED or on unit. 0   Restraints or seclusion episode in past 72 hours. 0   Recent (last 7 days) or current verbal aggression, agitation, yelling, etc., while in the ED or unit. 0   Active psychosis. 1   Need for constant or near constant redirection (from leaving, from others, etc).  0   Intrusive or disruptive behaviors. 1   Patient requires 3 or more hours of individualized nursing care per 8-hour shift (i.e. for ADLs, meds, therapeutic interventions). 1   TOTAL 8

## 2024-12-11 PROCEDURE — 99232 SBSQ HOSP IP/OBS MODERATE 35: CPT | Mod: GC | Performed by: STUDENT IN AN ORGANIZED HEALTH CARE EDUCATION/TRAINING PROGRAM

## 2024-12-11 PROCEDURE — 250N000013 HC RX MED GY IP 250 OP 250 PS 637

## 2024-12-11 PROCEDURE — 124N000002 HC R&B MH UMMC

## 2024-12-11 RX ADMIN — FUROSEMIDE 40 MG: 40 TABLET ORAL at 08:44

## 2024-12-11 RX ADMIN — Medication 1 PATCH: at 08:51

## 2024-12-11 RX ADMIN — AMLODIPINE BESYLATE 10 MG: 5 TABLET ORAL at 08:44

## 2024-12-11 RX ADMIN — CLONIDINE HYDROCHLORIDE 0.1 MG: 0.1 TABLET ORAL at 08:44

## 2024-12-11 RX ADMIN — OLANZAPINE 5 MG: 5 TABLET, ORALLY DISINTEGRATING ORAL at 20:05

## 2024-12-11 RX ADMIN — METOPROLOL SUCCINATE 50 MG: 50 TABLET, EXTENDED RELEASE ORAL at 08:44

## 2024-12-11 RX ADMIN — Medication 3 MG: at 20:04

## 2024-12-11 RX ADMIN — ATORVASTATIN CALCIUM 40 MG: 20 TABLET, FILM COATED ORAL at 08:44

## 2024-12-11 RX ADMIN — CINACALCET 30 MG: 30 TABLET ORAL at 08:44

## 2024-12-11 RX ADMIN — CLONIDINE HYDROCHLORIDE 0.1 MG: 0.1 TABLET ORAL at 20:05

## 2024-12-11 RX ADMIN — LISINOPRIL 40 MG: 10 TABLET ORAL at 08:44

## 2024-12-11 ASSESSMENT — ACTIVITIES OF DAILY LIVING (ADL)
ADLS_ACUITY_SCORE: 54
ORAL_HYGIENE: INDEPENDENT
DRESS: INDEPENDENT
ADLS_ACUITY_SCORE: 54
ORAL_HYGIENE: INDEPENDENT
ADLS_ACUITY_SCORE: 54
LAUNDRY: WITH SUPERVISION
ADLS_ACUITY_SCORE: 54
HYGIENE/GROOMING: INDEPENDENT
ADLS_ACUITY_SCORE: 54
HYGIENE/GROOMING: INDEPENDENT
DRESS: INDEPENDENT
ADLS_ACUITY_SCORE: 54

## 2024-12-11 NOTE — PLAN OF CARE
Problem: Anxiety  Goal: Anxiety Reduction or Resolution  Outcome: Progressing  Patient continues to be on SIO with 1 staffing for safety as ordered. Patient appeared to be asleep for 3.5 during this shift. No concerns or complaints voiced by patient or noted by staff. Will continue to monitor  the patient and update MD if there are any concerns. Patient appears calm, no behavioral issues or safety concerns. Will continue with current plan of care.

## 2024-12-11 NOTE — PLAN OF CARE
Problem: Sleep Disturbance  Goal: Adequate Sleep/Rest  Outcome: Not Progressing     Problem: Adult Behavioral Health Plan of Care  Goal: Adheres to Safety Considerations for Self and Others  Outcome: Progressing  Intervention: Develop and Maintain Individualized Safety Plan  Recent Flowsheet Documentation  Taken 12/11/2024 1300 by Rocío Curry RN  Safety Measures: environmental rounds completed  Intervention: Develop and Maintain Individualized Safety Plan  Recent Flowsheet Documentation  Taken 12/11/2024 1300 by Rocío Curry RN  Safety Measures: environmental rounds completed  Goal: Absence of New-Onset Illness or Injury  Outcome: Progressing  Intervention: Identify and Manage Fall Risk  Recent Flowsheet Documentation  Taken 12/11/2024 1300 by Rocío Curry RN  Safety Measures: environmental rounds completed     Problem: Depression  Goal: Improved Mood  Outcome: Progressing     Problem: Suicidal Behavior  Goal: Suicidal Behavior is Absent or Managed  Outcome: Progressing   Goal Outcome Evaluation:    Plan of Care Reviewed With: patient      Pt is visible by the lounge area, watching TV and snoozing every now and then. He goes back to his room to pee and then comes back to his chair . Pt was observed to make a phone call about lunch time.  Pt is eating and drinking adequately. Minimally interactive with peers. Interactions with peers are appropriate. Pt is pleasant and cooperative with staff. He is medication compliant. Per report , pt slept for 3.5 hours, doesn't feel it and is not aware of it. He denies any pain. Pt denies all mental health symptoms.

## 2024-12-11 NOTE — PLAN OF CARE
Team Note Due:  Monday    Assessment/Intervention/Current Symtoms and Care Coordination:  Chart review and met with team, discussed pt progress, symptomology, and response to treatment.  Discussed the discharge plan and any potential impediments to discharge. Clifford Agnieszka is currently emergency guardian/conservator until 01/20/2025.    Discharge Plan or Goal:  Memory care facility     Barriers to Discharge:  Patient requires further psychiatric stabilization due to current symptomology, medication management with changes subject to provider, coordination with outside supports, and aftercare planning. Pt is under civil commitment.     Referral Status:    Memory Care facilities currently in process:  [facility]. Tour completed 11/27.  [facility]. Tour completed 11/29. Records sent 12/2/24.  Lorraine (): [email or web address]; [phone]  Isatu (director of nursing): [email or web address]  [facility] [place]. Tour completed 11/29.  [facility]. Tour completed 11/30.    Memory Care facilities pending family review:  [facility].   [facility].  [facility] [place].  [facility].  [facility] [place].  [facility].  [facility].    Memory Care facilities declined:  [facility] (private pay only, no EW).  [facility] (private pay only, no EW).  [facility] [place] (no openings).     Legal Status:  MI Commitment with [facility]: [place]  File Number: [id number]  Start and expiration date of commitment: 10/24/24 - 04/24/25    [place] [facility] meds: Haldol, Clozaril, Risperdal, Invega, Zyprexa, Seroquel, Abilify    PPS/CM:  Shelby Chowdary: [phone]  [email or web address].    Contacts:  Maria C Agnieszka (Daughter): [phone]   Clifford Aaron (ex-wife):  [phone]     No Del Angel (guardianship/conservatorship ): [phone]  [email or web address]     Upcoming Meetings and Dates/Important Information and next steps:  Follow up with family regarding list of Memory Care facilities  Discharge planning when appropriate  Pt does not qualify for MA per financial counselor on 11/8/2024. Pt will likely privately pay for Memory Care until he qualifies for MA/waivers in the future.  MNChoice Assessment scheduled on 12/31 at 10:00am with Amanda Beard.  Call scheduling team if pt is discharged or if this needs to be rescheduled: [phone]     Provisional Discharge and Change of Status needed at discharge

## 2024-12-11 NOTE — PLAN OF CARE
BEH IP Unit Acuity Rating Score (UARS)  Patient is given one point for every criteria they meet.    CRITERIA SCORING   On a 72 hour hold, court hold, committed, stay of commitment, or revocation. 1    Patient LOS on BEH unit exceeds 20 days. 1  LOS: 60   Patient under guardianship, 55+, otherwise medically complex, or under age 11. 1   Suicide ideation without relief of precipitating factors. 0   Current plan for suicide. 0   Current plan for homicide. 0   Imminent risk or actual attempt to seriously harm another without relief of factors precipitating the attempt. 1   Severe dysfunction in daily living (ex: complete neglect for self care, extreme disruption in vegetative function, extreme deterioration in social interactions). 1   Recent (last 7 days) or current physical aggression in the ED or on unit. 0   Restraints or seclusion episode in past 72 hours. 0   Recent (last 7 days) or current verbal aggression, agitation, yelling, etc., while in the ED or unit. 0   Active psychosis. 1   Need for constant or near constant redirection (from leaving, from others, etc).  0   Intrusive or disruptive behaviors. 1   Patient requires 3 or more hours of individualized nursing care per 8-hour shift (i.e. for ADLs, meds, therapeutic interventions). 1   TOTAL 8

## 2024-12-11 NOTE — PROGRESS NOTES
"  ----------------------------------------------------------------------------------------------------------  [facility]  Psychiatry Progress Note  Hospital Day #60     Interim History:     The patient's care was discussed with the treatment team and chart notes were reviewed.    Identifier: Estevan Aaron is a 64 year old male with previous psychiatric diagnoses of unspecified anxiety vs generalized anxiety disorder admitted from the ED 10/12/2024 due to concern for HI and psychosis in the context of medical issues (hyperthyroidism, hypercalcemia) psychosocial stressors including family dynamics with recent possible divorce.    Vitals: VSS  Sleep: 3.5 hours (12/11/24 0600)  Scheduled medications: Took all scheduled medications as prescribed  Psychiatric PRN medications:   None    Staff Report:   Patient continues to be on SIO with 1 staffing for safety as ordered. No concerns or complaints voiced by patient or noted by staff. Will continue to monitor the patient and update MD if there are any concerns. Patient appears calm, no behavioral issues or safety concerns.     See staff notes for more information     Subjective:     Patient Interview:  Santos is met in his room. He says he is feeling good today, no trouble sleeping. When asked about if appetite is good, he says that the \"kids are not getting the bowl, who is feeding the kids?\". Team asked what that means and Santos responded \"you need to get out of my bowl\". Patient does not report other concerns and demonstrates some disorganized speech. He show us his progress with his drawing and mentions he will eventually finish it. He reports no other additional symptoms.     ROS:  See above     Objective:     Vitals:  BP (!) 148/79 (BP Location: Right arm)   Pulse 63   Temp 97.2  F (36.2  C) (Temporal)   Resp 18   Wt 110 kg (242 lb 8 oz)   SpO2 97%   BMI 39.14 kg/m      Allergies:  No Known Allergies    Current " Medications:  Scheduled:  Current Facility-Administered Medications   Medication Dose Route Frequency Provider Last Rate Last Admin    acetaminophen (TYLENOL) tablet 650 mg  650 mg Oral Q4H PRN Nikki Caceres MD   650 mg at 11/30/24 1133    alum & mag hydroxide-simethicone (MAALOX) suspension 30 mL  30 mL Oral Q4H PRN Nikki Caceres MD   30 mL at 10/17/24 0837    amLODIPine (NORVASC) tablet 10 mg  10 mg Oral Daily Presley Barrera MD   10 mg at 12/10/24 0936    atorvastatin (LIPITOR) tablet 40 mg  40 mg Oral Daily Nikki Caceres MD   40 mg at 12/10/24 0813    cholecalciferol (VITAMIN D3) capsule 1,250 mcg  1,250 mcg Oral Q7 Days Evelia Grimm DO   1,250 mcg at 12/10/24 1233    cinacalcet (SENSIPAR) tablet 30 mg  30 mg Oral Daily Presley Barrera MD   30 mg at 12/10/24 0813    cloNIDine (CATAPRES) tablet 0.1 mg  0.1 mg Oral BID Presley Barrera MD   0.1 mg at 12/10/24 2041    furosemide (LASIX) tablet 40 mg  40 mg Oral Daily Presley Barrera MD   40 mg at 12/09/24 0807    gabapentin (NEURONTIN) capsule 100 mg  100 mg Oral Q6H PRN Nikki Caceres MD   100 mg at 11/15/24 0418    hydrocortisone (CORTAID) 0.5 % cream   Topical Daily PRN Savi Chamorro MD        lisinopril (ZESTRIL) tablet 40 mg  40 mg Oral Daily Presley Barrera MD   40 mg at 12/10/24 0936    melatonin tablet 3 mg  3 mg Oral At Bedtime Presley Barrera MD   3 mg at 12/10/24 2041    metoprolol succinate ER (TOPROL XL) 24 hr tablet 50 mg  50 mg Oral Daily Presley Barrera MD   50 mg at 12/09/24 0806    nicotine (NICODERM CQ) 14 MG/24HR 24 hr patch 1 patch  1 patch Transdermal Daily Evelia Grimm DO   1 patch at 12/10/24 0926    nicotine (NICORETTE) gum 2 mg  2 mg Buccal Q1H PRN González Garcia MD   2 mg at 10/24/24 1254    OLANZapine zydis (zyPREXA) ODT tab 5 mg  5 mg Oral At Bedtime González Garcia MD   5 mg at 12/10/24 2041    Or    OLANZapine (zyPREXA) injection 10 mg  10 mg Intramuscular At Bedtime González Garcia MD         OLANZapine (zyPREXA) tablet 5 mg  5 mg Oral TID PRN Evelia Grimm DO   5 mg at 11/24/24 0436    Or    OLANZapine (zyPREXA) injection 5 mg  5 mg Intramuscular TID PRN Evelia Grimm DO        polyethylene glycol (MIRALAX) Packet 17 g  17 g Oral Daily PRN Nikki Caceres MD        traZODone (DESYREL) half-tab 25 mg  25 mg Oral At Bedtime PRN Evelia Grimm DO   25 mg at 11/29/24 0148    Or    traZODone (DESYREL) tablet 50 mg  50 mg Oral At Bedtime PRN Evelia Grimm DO           PRN:  Current Facility-Administered Medications   Medication Dose Route Frequency Provider Last Rate Last Admin    acetaminophen (TYLENOL) tablet 650 mg  650 mg Oral Q4H PRN Nikki Caceres MD   650 mg at 11/30/24 1133    alum & mag hydroxide-simethicone (MAALOX) suspension 30 mL  30 mL Oral Q4H PRN Nikki Caceres MD   30 mL at 10/17/24 0837    amLODIPine (NORVASC) tablet 10 mg  10 mg Oral Daily Presley Barrera MD   10 mg at 12/10/24 0936    atorvastatin (LIPITOR) tablet 40 mg  40 mg Oral Daily Nikki Caceres MD   40 mg at 12/10/24 0813    cholecalciferol (VITAMIN D3) capsule 1,250 mcg  1,250 mcg Oral Q7 Days Evelia Grimm DO   1,250 mcg at 12/10/24 1233    cinacalcet (SENSIPAR) tablet 30 mg  30 mg Oral Daily Presley Barrera MD   30 mg at 12/10/24 0813    cloNIDine (CATAPRES) tablet 0.1 mg  0.1 mg Oral BID Presley Barrera MD   0.1 mg at 12/10/24 2041    furosemide (LASIX) tablet 40 mg  40 mg Oral Daily Presley Barrera MD   40 mg at 12/09/24 0807    gabapentin (NEURONTIN) capsule 100 mg  100 mg Oral Q6H PRN Nikki Caceres MD   100 mg at 11/15/24 0418    hydrocortisone (CORTAID) 0.5 % cream   Topical Daily PRN Savi Chamorro MD        lisinopril (ZESTRIL) tablet 40 mg  40 mg Oral Daily Presley Barrera MD   40 mg at 12/10/24 0936    melatonin tablet 3 mg  3 mg Oral At Bedtime Presley Barrera MD   3 mg at 12/10/24 2041    metoprolol succinate ER (TOPROL XL) 24 hr tablet 50 mg  50 mg Oral Daily Presley Barrera MD   50 mg at  12/09/24 0806    nicotine (NICODERM CQ) 14 MG/24HR 24 hr patch 1 patch  1 patch Transdermal Daily Evelia Grimm DO   1 patch at 12/10/24 0926    nicotine (NICORETTE) gum 2 mg  2 mg Buccal Q1H PRN González Garcia MD   2 mg at 10/24/24 1254    OLANZapine zydis (zyPREXA) ODT tab 5 mg  5 mg Oral At Bedtime González Garcia MD   5 mg at 12/10/24 2041    Or    OLANZapine (zyPREXA) injection 10 mg  10 mg Intramuscular At Bedtime González Garcia MD        OLANZapine (zyPREXA) tablet 5 mg  5 mg Oral TID PRN Evelia Grimm DO   5 mg at 11/24/24 0436    Or    OLANZapine (zyPREXA) injection 5 mg  5 mg Intramuscular TID PRN Evelia Grimm DO        polyethylene glycol (MIRALAX) Packet 17 g  17 g Oral Daily PRN Nikki Caceres MD        traZODone (DESYREL) half-tab 25 mg  25 mg Oral At Bedtime PRN Evelia Grimm DO   25 mg at 11/29/24 0148    Or    traZODone (DESYREL) tablet 50 mg  50 mg Oral At Bedtime PRN Evelia Grimm DO         Labs and Imaging:  Data this admission:  - CBC unremarkable  - CMP unremarkable  - TSH normal  - UDS negative  - Vit D low  - Hgb A1c 5.9 (10/13/24)  - Lipids unremarkable  - Vit B12 normal  - Folate normal  - Urinalysis unremarkable  - EKG normal sinus rhythm, QTc 390 ms  - Head CT showed no acute changes  - HIV non reactive  - Treponema antibody non reactive  - ESR wnl   - Ceruloplasmin wnl   - BOOGIE negative  - Lyme negative      Parathyroid hormone:   10/13: 85     Calcium:  10/14: 11.4  10/16: 10.3  10/17: 10.3  10/19: 9.8  10/21: 10.6  10/23: 10.3     Albumin:  10/14: 4.3  10/16: 4.3  10/17: 4.1  10/19: 4.2  10/21: 4.5  10/23: 4.3      CXR:   Impression:   1. No definite radiographic evidence of asbestosis. If clinically  indicated, consider evaluation with high resolution chest CT.  2. Nonspecific left costophrenic blunting. Given symmetrically low  lung volumes may be related to poor inspiratory effort/timing.  3. Pulmonary vascular congestion.     MRI:   IMPRESSION:  1. No acute intracranial  "pathology.  2. No focal lesion or structural abnormality.   3. Symmetric frontoparietal cortical volume loss slightly more than  expected for age.     Mental Status Exam:   Oriented to: Oriented to self and time only  General:  Awake and Alert  Appearance:  appears stated age, Grooming is adequate, and Dressed in his own clothes  Behavior/Attitude:  Calm, Disengaged, Bizarre, Easy to redirect, and Easily distracted  Eye Contact: Appropriate for conversation  Psychomotor: No evidence of tics, dystonia, or tardive dyskinesia  no catatonia present  Speech:  appropriate volume/tone  Language: Fluent in English with appropriate syntax and vocabulary.  Mood:  \"good\"  Affect:  confused  Thought Process:  tangential, disorganized, confused, and incoherent  Thought Content:   did not assess SI/HI/ delusion of confusion . Patient denies paranoia  Associations:  loose  Insight:  impaired   Judgment:  impaired   Impulse control: limited  Attention Span:   decreased  Concentration:   distractible  Recent and Remote Memory:   remote memory intact, recent memory impaired  Fund of Knowledge: average  Muscle Strength and Tone: normal  Gait and Station: Normal     Psychiatric Assessment     Estevan Aaron is a 64 year old male with previous psychiatric diagnoses of GEORGINA admitted from the ER on 10/12/2024 due to concern for HI and psychosis in the context of medical issues (hyperthyroidism, hypercalcemia), psychosocial stressors including with recent divorce and selling his home. This is the patient's first psychiatric hospitalization. Significant symptoms on admission included delusions of persecution with grandiose beliefs, homicidal ideations, as well as disorganized thinking and behavior. The MSE on admission was pertinent for a thought process which was perseverative, circumstantial, tangential, disorganized and tangential; with rambling and looseness of associations. Psychological contributions to presentation included lack of " insight. Social factors contributing to presentation included isolation, recent divorce, selling his house, and moving from hotel to hotel. Biological contributions to presentation included a history of hyperparathyroidism with chronic hypercalcemia per charts as well as a history of methamphetamine use per collateral from ex-wife.     History was difficult to obtain due to the patient's severe disorganized thinking on interview; he was observed with persecutory delusions pertaining to the realtor and gang members, disorganized behavior as these delusions have led him to flee to different hotels to stay safe/ has called  complaining of being targeted and auditory hallucinations of voices for the past 12 months. Per collateral from ex-wife, Santos has had paranoid ideations since they first met. He has always felt like people are out to get him or trying to rip him off. She says that he has had visual hallucinations (he will point things out that the rest of the family can't see) for a long time, but the family would just go along with him to avoid making him angry. This timeline and presentation could be consistent with diagnosis of paranoid personality disorder. Things began to worsen around the beginning of the COVID pandemic, when Santos became more isolated and the family started to notice cognitive issues such as impaired memory. Immediately prior to this hospitalization, the ex-wife found Santos in a hotel room with a generator full of gasoline, binoculars, and hunting equipment. This time course does not suggest acute psychosis, however given the patient has never had a full psychiatric work-up, we completed a first-episode psychosis work-up.      Other things that could be contributing to his presentation is hyperparathyroidism with hypercalcemia. However, patient's calcium returned to normal limits on 10/16, and patient continues to have psychosis so likely not a significant contributing factor to patient's  presentation. Still we will assess with new labs tomorrow.     Patient also continues to be disoriented and his behaviors appear to worsen in the evenings, this seems to be due to current Neurocognitive Disorder diagnosed, this could evolved to be his new baseline. Patient currently with no more improve in bizarre behaviors with current neuroleptic dose, patient probably wont benefit from any modification in current therapy. Still patient did not present with any new episodes of physical agitation and is able to attend groups and interact with peers without major altercations.    Goal of treatment: pending placement to a memory care facility long term.           Psychiatric Plan by Diagnosis   Today's changes:  - none    # Major Neurocognitive Disorder  # Rule out paranoid personality disorder  1. Medications:  - Olanzapine 5 mg at bedtime  - Neurology consult 11/8/24, recommend outpatient follow-up and neuropsychiatric testing     2. Pertinent Labs/Monitoring:   - Re-eval Calcium parameters tomorrow     3. Additional Plans:  - Patient will be treated in therapeutic milieu with appropriate individual and group therapies as described  - Patient is Commitment with [facility]  - [facility] meds: Haldol, Clozaril, Risperdal, Invega, Zyprexa, Seroquel, Abilify     # Unspecified anxiety vs Generalized Anxiety Disorder  - Monitor for symptoms.  - Fluoxetine held due to suspicion of ongoing manic symptoms     Psychiatric Hospital Course:      Estevan Aaron was admitted to [facility] on a 72 hour hold.   Medications:  PTA fluoxetine was held due to concern for worsening of zelalem   New medications started at the time of admission include Zyprexa.   Increased olanzapine 10 mg at bedtime was to 10 mg BID (10/14)   Increased olanzapine 10 mg BID to 10 mg during day and 15 mg at bedtime (10/15)  10/21: increased olanzapine pm dose from 15 to 20 mg  10/23: started melatonin 3 mg at 7 pm to help patient with circadian rhythm as he has been  staying up throughout the night and sleeping a lot during the day and there is some concern for delirium   10/24: consulted anesthesia for MRI brain   10/28: MRI brain complete, decrease AM olanzapine from 10 to 5 mg  10/29: Morning olanzapine decreased to 2.5 mg, evening olanzapine decreased to 15 mg   11/1: Decreased evening olanzapine to 10 mg   11/4: Decreased evening olanzapine to 7.5 mg   11/5: Decreased evening olanzapine to 5 mg   11/11: Moved morning dose of olanzapine to the afternoon as patient appears more agitated in the afternoons/evenings  11/21: Started memantine 5 mg daily   11/26: Discontinued olanzapine 2.5 mg during the afternoon  12/2:   Discontinued memantine 5 mg, daily     Care conference 10/18/24 with daughter Maria C and ex-wife Clifford  - Report that patient has always been paranoid since ex-wife first met him. She describes him always feeling like he is getting ripped off.   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 They reported once incidence in which he made a bid on a job and the client paid him, but he never ended up finishing the job  - Wife and him  officially this year, and since selling the house several months ago, patient has been moving from hotel to hotel due to thinking people are out to get him   - When they were selling their house, patient threatened realtors and felt he was being ripped off   - Clifford reports that she started to be afraid to be around him alone  - When he was found in the hotel, he had a generator full of gasoline,  binoculars, bullets, and hunting equipment.     10/21/24: updated daughter on Santos's treatment plan      10/23/24: updated daughter on Santos's treatment plan      10/25/24: updated daughter on Santos's treatment plan, including plan to try and get MRI brain done under sedation. She said that Santos's grandfather had dementia and his sister has a brain tumor.      10/28/24: updated daughter on Santos's MRI results. She is planning on coming into town soon.      Care conference 11/1/24 with daughters Maria C and Estefani and ex-wife Clifford  - Discussed dementia diagnosis   - Clifford asked about plans moving forward. Explained that applying for medical assistance is the first step. Explained that application processing time can be very variable. Informed family that Santos will most likely be staying on this inpatient unit during this time.   - Clifford expressed that their family would like to be the power of /have conservatorship for Santos.   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 Reports that he has bills that he has not paid.   - Maria C is planning to move to [place], and Clifford currently lives in [place]. Family prefer that Santos would in a facility that are near these locations. Discussed with family that they can also try to request a specific location (memory care).   - Clifford reports that he had previously gotten help applying for his social security and medicare, but unsure if it had been approved.   - Informed family that there is a geriatric unit and there might be a transfer if there becomes availability on this unit.   - Family shared that he was completely different just two months ago.   - Estefani shared that his family found his medications in his old truck recently, expressed that it was likely that he was not taking his medications prior to his hospitalization.      11/6/24: updated Maria C on plan to try and transfer Santos to Geriatric unit.       11/11/24: updated Maria C on neurology consult      The risks, benefits, alternatives, and side effects were discussed and understood by the patient and other caregivers.     Medical Assessment and Plan     Medical diagnoses to be addressed this admission:    # Hypertension  - Continue PTA medications  Furosemide 40 mg daily  Lisinopril 40 mg daily  Metoprolol 50 mg daily  Amlodipine 10 mg daily  Clonidine 0.1 mg BID     # Hyperlipidemia  - Continue PTA Atorvastatin 40 mg     # Primary Hyperparathyroidism  # Hypercalcemia, hypophosphoremia   Increased Ca level to 10.9 and decreased Ph level to 2.3 in the ED. Suspicion patient's hypercalcemia could be contributing to symptoms of psychosis.  - Consulted endocrinology, who started cinacalcet 30 mg BID on 10/13  - 10/16 endocrinology recommended continuing to trend calcium and albumin to make sure patient does not become hypocalcemic and recommended holding cinacalcet   - 10/17, calcium and albumin wnl, endo recommended cinacalcet 30 mg once daily   - 10/21, per endo continue cincaclcet and recheck calcium and albumin on October 24  - 10/23: per endo, patient needs to follow up outpatient for further management of hyperparathyroidism      Medical course: Patient was physically examined by the ED prior to being transferred to the unit and was found to be medically stable and appropriate for admission.      Consults: Psychiatry, Endocrinology (follow-up of hyperthyroidism / hypercalcemia and hypertension), neurology     Checklist     Legal Status: Committed   MI Commitment with [facility]  File Number: [id number]  Start and expiration date of commitment: 10/24/24 - 04/24/25     [facility] meds: Haldol, Clozaril, Risperdal, Invega, Zyprexa, Seroquel, Abilify     PPS/CM:  Shelby Chowdary: [phone]  [email or web address].    Safety Assessment:   Behavioral Orders   Procedures    Assault precautions    Code 1 - Restrict to Unit    Routine  Programming     As clinically indicated    Status 15     Every 15 minutes.    Status Individual Observation     1:1 during evening shift, & night shift.    If patient refuses overnight presence of staff in his room, it's ok to do 15 min checks through the window blinds.    Patient SIO status reviewed with team/RN.  Please also refer to RN/team documentation for add'l detail.    -SIO staff to monitor following which have contributed to patient being on SIO:  Pt has psychosis regarding being followed and attacked, very paranoid, HI    -Possible interventions SIO staff could use to support patient's treatment progress:  Redirect and reassure  -When following observed, team will review discontinuation of SIO:  Psychosis improves     Order Specific Question:   CONTINUOUS 24 hours / day     Answer:   Other     Order Specific Question:   Specify distance     Answer:   10 feet, 5 feet when close to the doors     Order Specific Question:   Indications for SIO     Answer:   Assault risk     Order Specific Question:   Indications for SIO     Answer:   Severe intrusiveness       Risk Assessment:    Risk for harm is moderate  Risk factors: impulsive and past behaviors  Protective factors: family      Disposition: Pending stabilization, medication optimization, & development of a safe discharge plan.     Attestations     Resident without student: This patient was seen and discussed with my attending physician.    Rocío Orellana MD  N Psychiatry Resident  12/11/2024

## 2024-12-12 VITALS
RESPIRATION RATE: 17 BRPM | TEMPERATURE: 98.1 F | SYSTOLIC BLOOD PRESSURE: 119 MMHG | WEIGHT: 242.5 LBS | HEART RATE: 92 BPM | OXYGEN SATURATION: 98 % | BODY MASS INDEX: 39.14 KG/M2 | DIASTOLIC BLOOD PRESSURE: 73 MMHG

## 2024-12-12 PROCEDURE — 124N000002 HC R&B MH UMMC

## 2024-12-12 PROCEDURE — 250N000013 HC RX MED GY IP 250 OP 250 PS 637

## 2024-12-12 PROCEDURE — 99232 SBSQ HOSP IP/OBS MODERATE 35: CPT | Mod: GC | Performed by: STUDENT IN AN ORGANIZED HEALTH CARE EDUCATION/TRAINING PROGRAM

## 2024-12-12 RX ADMIN — Medication 3 MG: at 20:21

## 2024-12-12 RX ADMIN — AMLODIPINE BESYLATE 10 MG: 5 TABLET ORAL at 08:35

## 2024-12-12 RX ADMIN — ATORVASTATIN CALCIUM 40 MG: 20 TABLET, FILM COATED ORAL at 08:36

## 2024-12-12 RX ADMIN — METOPROLOL SUCCINATE 50 MG: 50 TABLET, EXTENDED RELEASE ORAL at 08:36

## 2024-12-12 RX ADMIN — CLONIDINE HYDROCHLORIDE 0.1 MG: 0.1 TABLET ORAL at 08:36

## 2024-12-12 RX ADMIN — CLONIDINE HYDROCHLORIDE 0.1 MG: 0.1 TABLET ORAL at 20:21

## 2024-12-12 RX ADMIN — FUROSEMIDE 40 MG: 40 TABLET ORAL at 08:35

## 2024-12-12 RX ADMIN — Medication 1 PATCH: at 08:39

## 2024-12-12 RX ADMIN — LISINOPRIL 40 MG: 10 TABLET ORAL at 08:35

## 2024-12-12 RX ADMIN — OLANZAPINE 5 MG: 5 TABLET, ORALLY DISINTEGRATING ORAL at 20:21

## 2024-12-12 RX ADMIN — CINACALCET 30 MG: 30 TABLET ORAL at 08:36

## 2024-12-12 ASSESSMENT — ACTIVITIES OF DAILY LIVING (ADL)
ADLS_ACUITY_SCORE: 54
DRESS: STREET CLOTHES;INDEPENDENT
ADLS_ACUITY_SCORE: 54
ORAL_HYGIENE: INDEPENDENT
ADLS_ACUITY_SCORE: 54
LAUNDRY: WITH SUPERVISION
ADLS_ACUITY_SCORE: 54
HYGIENE/GROOMING: BATH
LAUNDRY: WITH SUPERVISION
ADLS_ACUITY_SCORE: 54
ADLS_ACUITY_SCORE: 54
HYGIENE/GROOMING: INDEPENDENT
DRESS: INDEPENDENT
ADLS_ACUITY_SCORE: 54
ORAL_HYGIENE: INDEPENDENT
ADLS_ACUITY_SCORE: 54

## 2024-12-12 NOTE — PLAN OF CARE
"  Problem: Adult Behavioral Health Plan of Care  Goal: Patient-Specific Goal (Individualization)  Description: You can add care plan individualizations to a care plan. Examples of Individualization might be:  \"Parent requests to be called daily at 9am for status\", \"I have a hard time hearing out of my right ear\", or \"Do not touch me to wake me up as it startles  me\".  Outcome: Progressing  Goal: Adheres to Safety Considerations for Self and Others  Outcome: Progressing  Intervention: Develop and Maintain Individualized Safety Plan  Recent Flowsheet Documentation  Taken 12/12/2024 1400 by Rocío Curry, RN  Safety Measures: safety rounds completed  Intervention: Develop and Maintain Individualized Safety Plan  Recent Flowsheet Documentation  Taken 12/12/2024 1400 by Rocío Curry, RN  Safety Measures: safety rounds completed  Goal: Absence of New-Onset Illness or Injury  Outcome: Progressing  Intervention: Identify and Manage Fall Risk  Recent Flowsheet Documentation  Taken 12/12/2024 1400 by Rocío Curry RN  Safety Measures: safety rounds completed     Problem: Suicidal Behavior  Goal: Suicidal Behavior is Absent or Managed  Outcome: Progressing     Problem: Sleep Disturbance  Goal: Adequate Sleep/Rest  Outcome: Progressing     Problem: Pain Acute  Goal: Optimal Pain Control and Function  Outcome: Progressing   Goal Outcome Evaluation:    Plan of Care Reviewed With: patient    Pt  is visible in the milieu. He likes to watch TV and he has a favorite spot at the [facility] area. Pt is pleasant and cooperative, medication compliant. Pt is eating and hydrating adequately. Pt was able to take shower this shift after a lot of encouragement/promptings. He also did his laundry this shift. Pt is more interactive with staff this shift. Pt denied all mental health symptoms. He declined lab draw in the morning.    Pt SIO discontinued for evening and night shift. No behavioral concerns.        "

## 2024-12-12 NOTE — PLAN OF CARE
Team Note Due:  Monday    Assessment/Intervention/Current Symtoms and Care Coordination:  Chart review and met with team, discussed pt progress, symptomology, and response to treatment.  Discussed the discharge plan and any potential impediments to discharge. Clifford Aaron is currently emergency guardian/conservator until 01/20/2025.    Isatu from [facility] requested a call at 11:30am today to discuss pt further. Writer, attending MD Dr. Parks, and psych resident Dr. Juarez spoke with Isatu and clarified pt's medications, behaviors, and appropriate for discharge as soon as placement is secured. Isatu stated she would review information further and she or Lorraine will get back to writer with any updates.    Sent update to pt's family.    Discharge Plan or Goal:  Memory care facility     Barriers to Discharge:  Patient requires further psychiatric stabilization due to current symptomology, medication management with changes subject to provider, coordination with outside supports, and aftercare planning. Pt is under civil commitment.     Referral Status:    Memory Care facilities currently in process:  [facility]. Tour completed 11/27.  [facility]. Tour completed 11/29. Records sent 12/2/24.  Lorraine (): [email or web address]; [phone]  Isatu (director of nursing): [email or web address]  [facility] [place]. Tour completed 11/29.  [facility]. Tour completed 11/30.    Memory Care facilities pending family review:  [facility].   [facility].  [facility] [place].  [facility].  [facility].  [facility].  [facility].    Memory Care facilities declined:  [facility] (private pay only, no EW).  [facility] [address] (private pay only, no EW).  [facility] [place] (no openings).     Legal Status:  [place] Commitment with [facility]  File Number: [id number]  Start and expiration date of commitment: 10/24/24 - 04/24/25    [place] meds: Haldol, Clozaril, Risperdal, Invega, Zyprexa, Seroquel, Abilify    PPS/CM:  Shelby Chowdary: [phone]  [email or web address]    Contacts:  Maria C Aaron (Daughter): [phone]   Clifford Aaron (ex-wife): [phone]     No Del Angel (guardianship/conservatorship ): [phone]  [email or web address]     Upcoming Meetings and Dates/Important Information and next steps:  Follow up with family regarding list of Memory Care facilities  Discharge planning when appropriate  Pt does not qualify for MA per financial counselor on 11/8/2024. Pt will likely privately pay for Memory Care until he qualifies for MA/waivers in the future.  MNChoice Assessment scheduled on 12/31 at 10:00am with Amanda Beard.  Call scheduling team if pt is discharged or if this needs to be rescheduled: [phone]     Provisional Discharge and Change of Status needed at discharge
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
## 2024-12-12 NOTE — PLAN OF CARE
Pt was visible in the milieu. Affect is flat and thoughts are disorganized. Pt is confused at times and makes irrelevant and delusional statements. Pt was compliant with medications. No prns administered this shift. Ate supper well. Temp: 97.8  F (36.6  C) Temp src: Oral BP: 120/68 Pulse: 60   Resp: 18 SpO2: 96 % O2 Device: None (Room air)        Goal Outcome Evaluation:    Problem: Depression  Goal: Improved Mood  Outcome: Progressing     Problem: Suicidal Behavior  Goal: Suicidal Behavior is Absent or Managed  Outcome: Progressing

## 2024-12-12 NOTE — PROGRESS NOTES
"  ----------------------------------------------------------------------------------------------------------  [facility]  Psychiatry Progress Note  Hospital Day #61     Interim History:     The patient's care was discussed with the treatment team and chart notes were reviewed.    Identifier: Estevan Aaron is a 64 year old male with previous psychiatric diagnoses of unspecified anxiety vs generalized anxiety disorder admitted from the ED 10/12/2024 due to concern for HI and psychosis in the context of medical issues (hyperthyroidism, hypercalcemia) psychosocial stressors including family dynamics with recent possible divorce.    Vitals: VSS  Sleep: 3.75 hours (12/12/24 0600)  Scheduled medications: Took all scheduled medications as prescribed  Psychiatric PRN medications:   None    Staff Report:   Patient affect flat, mood calm, easily redirectable. Patient no complaints of pain and discomfort and was visible in the milieu. Affect is flat and thoughts are disorganized. Pt is confused at times and makes irrelevant and delusional statements but compliant with medications. No prns administered this shift.      See staff notes for more information     Subjective:     Patient Interview:  Santos was met on the milieu, he mentions that he \"cannot talk\" as he \"on his way to take a shower\". Patient mentions that \"they said I stink, but I don't\". Team informs him that if he has any needs to let the team know.     ROS:  See above     Objective:     Vitals:  /68   Pulse 60   Temp 98.1  F (36.7  C) (Oral)   Resp 18   Wt 110 kg (242 lb 8 oz)   SpO2 97%   BMI 39.14 kg/m      Allergies:  No Known Allergies    Current Medications:  Scheduled:  Current Facility-Administered Medications   Medication Dose Route Frequency Provider Last Rate Last Admin    acetaminophen (TYLENOL) tablet 650 mg  650 mg Oral Q4H PRN Nikki Caceres MD   650 mg at 11/30/24 1133    alum & mag " hydroxide-simethicone (MAALOX) suspension 30 mL  30 mL Oral Q4H PRN Nikki Caceres MD   30 mL at 10/17/24 0837    amLODIPine (NORVASC) tablet 10 mg  10 mg Oral Daily Presley Barrera MD   10 mg at 12/11/24 0844    atorvastatin (LIPITOR) tablet 40 mg  40 mg Oral Daily Nikki Caceres MD   40 mg at 12/11/24 0844    cholecalciferol (VITAMIN D3) capsule 1,250 mcg  1,250 mcg Oral Q7 Days Evelia Grimm DO   1,250 mcg at 12/10/24 1233    cinacalcet (SENSIPAR) tablet 30 mg  30 mg Oral Daily Presley Barrera MD   30 mg at 12/11/24 0844    cloNIDine (CATAPRES) tablet 0.1 mg  0.1 mg Oral BID Presley Barrera MD   0.1 mg at 12/11/24 2005    furosemide (LASIX) tablet 40 mg  40 mg Oral Daily Presley Barrera MD   40 mg at 12/11/24 0844    gabapentin (NEURONTIN) capsule 100 mg  100 mg Oral Q6H PRN Nikki Caceres MD   100 mg at 11/15/24 0418    hydrocortisone (CORTAID) 0.5 % cream   Topical Daily PRN Savi Chamorro MD        lisinopril (ZESTRIL) tablet 40 mg  40 mg Oral Daily Presley Barrera MD   40 mg at 12/11/24 0844    melatonin tablet 3 mg  3 mg Oral At Bedtime Presley Barrera MD   3 mg at 12/11/24 2004    metoprolol succinate ER (TOPROL XL) 24 hr tablet 50 mg  50 mg Oral Daily Presley Barrera MD   50 mg at 12/11/24 0844    nicotine (NICODERM CQ) 14 MG/24HR 24 hr patch 1 patch  1 patch Transdermal Daily Evelia Grimm DO   1 patch at 12/11/24 0851    nicotine (NICORETTE) gum 2 mg  2 mg Buccal Q1H PRN González Garcia MD   2 mg at 10/24/24 1254    OLANZapine zydis (zyPREXA) ODT tab 5 mg  5 mg Oral At Bedtime González Garcia MD   5 mg at 12/11/24 2005    Or    OLANZapine (zyPREXA) injection 10 mg  10 mg Intramuscular At Bedtime González Garcia MD        OLANZapine (zyPREXA) tablet 5 mg  5 mg Oral TID PRN Evelia Grimm DO   5 mg at 11/24/24 0436    Or    OLANZapine (zyPREXA) injection 5 mg  5 mg Intramuscular TID PRN Evelia Grimm DO        polyethylene glycol (MIRALAX) Packet 17 g  17 g Oral Daily PRN Nicki,  MD Nikki        traZODone (DESYREL) half-tab 25 mg  25 mg Oral At Bedtime PRN Evelia Grimm DO   25 mg at 11/29/24 0148    Or    traZODone (DESYREL) tablet 50 mg  50 mg Oral At Bedtime PRN Evelia Grimm DO           PRN:  Current Facility-Administered Medications   Medication Dose Route Frequency Provider Last Rate Last Admin    acetaminophen (TYLENOL) tablet 650 mg  650 mg Oral Q4H PRN Nikki Caceres MD   650 mg at 11/30/24 1133    alum & mag hydroxide-simethicone (MAALOX) suspension 30 mL  30 mL Oral Q4H PRN Nikki Caceres MD   30 mL at 10/17/24 0837    amLODIPine (NORVASC) tablet 10 mg  10 mg Oral Daily Presley Barrera MD   10 mg at 12/11/24 0844    atorvastatin (LIPITOR) tablet 40 mg  40 mg Oral Daily Nikki Caceres MD   40 mg at 12/11/24 0844    cholecalciferol (VITAMIN D3) capsule 1,250 mcg  1,250 mcg Oral Q7 Days Evelia Grimm DO   1,250 mcg at 12/10/24 1233    cinacalcet (SENSIPAR) tablet 30 mg  30 mg Oral Daily Presley Barrera MD   30 mg at 12/11/24 0844    cloNIDine (CATAPRES) tablet 0.1 mg  0.1 mg Oral BID Presley Barrera MD   0.1 mg at 12/11/24 2005    furosemide (LASIX) tablet 40 mg  40 mg Oral Daily Presley Barrera MD   40 mg at 12/11/24 0844    gabapentin (NEURONTIN) capsule 100 mg  100 mg Oral Q6H PRN Nikki Caceres MD   100 mg at 11/15/24 0418    hydrocortisone (CORTAID) 0.5 % cream   Topical Daily PRN Savi Chamorro MD        lisinopril (ZESTRIL) tablet 40 mg  40 mg Oral Daily Presley Barrera MD   40 mg at 12/11/24 0844    melatonin tablet 3 mg  3 mg Oral At Bedtime Presley Barrera MD   3 mg at 12/11/24 2004    metoprolol succinate ER (TOPROL XL) 24 hr tablet 50 mg  50 mg Oral Daily Presley Barrera MD   50 mg at 12/11/24 0844    nicotine (NICODERM CQ) 14 MG/24HR 24 hr patch 1 patch  1 patch Transdermal Daily Evelia Grimm,    1 patch at 12/11/24 0851    nicotine (NICORETTE) gum 2 mg  2 mg Buccal Q1H PRN González Garcia MD   2 mg at 10/24/24 1254    OLANZapine zydis  (zyPREXA) ODT tab 5 mg  5 mg Oral At Bedtime González Garcia MD   5 mg at 12/11/24 2005    Or    OLANZapine (zyPREXA) injection 10 mg  10 mg Intramuscular At Bedtime González Garcia MD        OLANZapine (zyPREXA) tablet 5 mg  5 mg Oral TID PRN Evelia Grimm,    5 mg at 11/24/24 0436    Or    OLANZapine (zyPREXA) injection 5 mg  5 mg Intramuscular TID PRN Evelia Grimm, DO        polyethylene glycol (MIRALAX) Packet 17 g  17 g Oral Daily PRN Nikki Caceres MD        traZODone (DESYREL) half-tab 25 mg  25 mg Oral At Bedtime PRN Evelia Grimm, DO   25 mg at 11/29/24 0148    Or    traZODone (DESYREL) tablet 50 mg  50 mg Oral At Bedtime PRN Evelia Grimm,          Labs and Imaging:  Data this admission:  - CBC unremarkable  - CMP unremarkable  - TSH normal  - UDS negative  - Vit D low  - Hgb A1c 5.9 (10/13/24)  - Lipids unremarkable  - Vit B12 normal  - Folate normal  - Urinalysis unremarkable  - EKG normal sinus rhythm, QTc 390 ms  - Head CT showed no acute changes  - HIV non reactive  - Treponema antibody non reactive  - ESR wnl   - Ceruloplasmin wnl   - BOOGIE negative  - Lyme negative      Parathyroid hormone:   10/13: 85     Calcium:  10/14: 11.4  10/16: 10.3  10/17: 10.3  10/19: 9.8  10/21: 10.6  10/23: 10.3     Albumin:  10/14: 4.3  10/16: 4.3  10/17: 4.1  10/19: 4.2  10/21: 4.5  10/23: 4.3      CXR:   Impression:   1. No definite radiographic evidence of asbestosis. If clinically  indicated, consider evaluation with high resolution chest CT.  2. Nonspecific left costophrenic blunting. Given symmetrically low  lung volumes may be related to poor inspiratory effort/timing.  3. Pulmonary vascular congestion.     MRI:   IMPRESSION:  1. No acute intracranial pathology.  2. No focal lesion or structural abnormality.   3. Symmetric frontoparietal cortical volume loss slightly more than  expected for age.     Mental Status Exam:   Oriented to: Oriented to self and time only  General:  Awake and Alert  Appearance:   "appears stated age, Grooming is adequate, and Dressed in his own clothes  Behavior/Attitude:  Calm, Disengaged, Bizarre, Easy to redirect, and Easily distracted  Eye Contact: Appropriate for conversation  Psychomotor: No evidence of tics, dystonia, or tardive dyskinesia  no catatonia present  Speech:  appropriate volume/tone  Language: Fluent in English with appropriate syntax and vocabulary.  Mood:  \"good\"  Affect:  confused  Thought Process:  tangential, disorganized, confused, and incoherent  Thought Content:   did not assess SI/HI/ delusion of confusion . Patient denies paranoia  Associations:  loose  Insight:  impaired   Judgment:  impaired   Impulse control: limited  Attention Span:   decreased  Concentration:   distractible  Recent and Remote Memory:   remote memory intact, recent memory impaired  Fund of Knowledge: average  Muscle Strength and Tone: normal  Gait and Station: Normal     Psychiatric Assessment     Estevan Aaron is a 64 year old male with previous psychiatric diagnoses of GEORGINA admitted from the ER on 10/12/2024 due to concern for HI and psychosis in the context of medical issues (hyperthyroidism, hypercalcemia), psychosocial stressors including with recent divorce and selling his home. This is the patient's first psychiatric hospitalization. Significant symptoms on admission included delusions of persecution with grandiose beliefs, homicidal ideations, as well as disorganized thinking and behavior. The MSE on admission was pertinent for a thought process which was perseverative, circumstantial, tangential, disorganized and tangential; with rambling and looseness of associations. Psychological contributions to presentation included lack of insight. Social factors contributing to presentation included isolation, recent divorce, selling his house, and moving from hotel to hotel. Biological contributions to presentation included a history of hyperparathyroidism with chronic hypercalcemia per charts " as well as a history of methamphetamine use per collateral from ex-wife.     History was difficult to obtain due to the patient's severe disorganized thinking on interview; he was observed with persecutory delusions pertaining to the realtor and gang members, disorganized behavior as these delusions have led him to flee to different hotels to stay safe/ has called  complaining of being targeted and auditory hallucinations of voices for the past 12 months. Per collateral from ex-wife, Santos has had paranoid ideations since they first met. He has always felt like people are out to get him or trying to rip him off. She says that he has had visual hallucinations (he will point things out that the rest of the family can't see) for a long time, but the family would just go along with him to avoid making him angry. This timeline and presentation could be consistent with diagnosis of paranoid personality disorder. Things began to worsen around the beginning of the COVID pandemic, when Santos became more isolated and the family started to notice cognitive issues such as impaired memory. Immediately prior to this hospitalization, the ex-wife found Santos in a hotel room with a generator full of gasoline, binoculars, and hunting equipment. This time course does not suggest acute psychosis, however given the patient has never had a full psychiatric work-up, we completed a first-episode psychosis work-up.      Other things that could be contributing to his presentation is hyperparathyroidism with hypercalcemia. However, patient's calcium returned to normal limits on 10/16, and patient continues to have psychosis so likely not a significant contributing factor to patient's presentation. Still we will assess with new labs tomorrow.     Patient also continues to be disoriented and his behaviors appear to worsen in the evenings, this seems to be due to current Neurocognitive Disorder diagnosed, this could evolved to be his new baseline.  Patient currently with no more improvement of bizarre behaviors with current neuroleptic dose, patient probably wont benefit from any modification in current therapy. Still patient did not present with any new episodes of physical agitation and is able to attend groups and interact with peers without major altercations. Today Memory care team interview with Psychiatry team regarding Santos's progress.    Goal of treatment: pending placement to a memory care facility long term.         Psychiatric Plan by Diagnosis   Today's changes:  - none    # Major Neurocognitive Disorder  # Rule out paranoid personality disorder  1. Medications:  - Olanzapine 5 mg at bedtime  - Neurology consult 11/8/24, recommend outpatient follow-up and neuropsychiatric testing     2. Pertinent Labs/Monitoring:   - Re-eval Calcium parameters tomorrow     3. Additional Plans:  - Patient will be treated in therapeutic milieu with appropriate individual and group therapies as described  - Patient is Commitment with [facility]  - [facility] meds: Haldol, Clozaril, Risperdal, Invega, Zyprexa, Seroquel, Abilify  - Today meeting with Memory Care facility personnel     # Unspecified anxiety vs Generalized Anxiety Disorder  - Monitor for symptoms.  - Fluoxetine held due to suspicion of ongoing manic symptoms     Psychiatric Hospital Course:      Estevan Aaron was admitted to [facility] on a 72 hour hold.   Medications:  PTA fluoxetine was held due to concern for worsening of zelalem   New medications started at the time of admission include Zyprexa.   Increased olanzapine 10 mg at bedtime was to 10 mg BID (10/14)   Increased olanzapine 10 mg BID to 10 mg during day and 15 mg at bedtime (10/15)  10/21: increased olanzapine pm dose from 15 to 20 mg  10/23: started melatonin 3 mg at 7 pm to help patient with circadian rhythm as he has been staying up throughout the night and sleeping a lot during the day and there is some concern for delirium   10/24: consulted  anesthesia for MRI brain   10/28: MRI brain complete, decrease AM olanzapine from 10 to 5 mg  10/29: Morning olanzapine decreased to 2.5 mg, evening olanzapine decreased to 15 mg   11/1: Decreased evening olanzapine to 10 mg   11/4: Decreased evening olanzapine to 7.5 mg   11/5: Decreased evening olanzapine to 5 mg   11/11: Moved morning dose of olanzapine to the afternoon as patient appears more agitated in the afternoons/evenings  11/21: Started memantine 5 mg daily   11/26: Discontinued olanzapine 2.5 mg during the afternoon  12/2:   Discontinued memantine 5 mg, daily     Care conference 10/18/24 with daughter Maria C and ex-wife Clifford  - Report that patient has always been paranoid since ex-wife first met him. She describes him always feeling like he is getting ripped off.   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 They reported once incidence in which he made a bid on a job and the client paid him, but he never ended up finishing the job  - Wife and him  officially this year, and since selling the house several months ago, patient has been moving from hotel to hotel due to thinking people are out to get him   - When they were selling their house, patient threatened realtors and felt he was being ripped off   - Clifford reports that she started to be afraid to be around him alone  - When he was found in the hotel, he had a generator full of gasoline, binoculars, bullets, and hunting equipment.     10/21/24: updated daughter on Santos's treatment plan      10/23/24: updated daughter  on Santos's treatment plan      10/25/24: updated daughter on Santos's treatment plan, including plan to try and get MRI brain done under sedation. She said that Santos's grandfather had dementia and his sister has a brain tumor.      10/28/24: updated daughter on Santos's MRI results. She is planning on coming into town soon.      Care conference 11/1/24 with daughters Maria C and Estefani and ex-wife Clifford  - Discussed dementia diagnosis   - Clifford asked about plans moving forward. Explained that applying for medical assistance is the first step. Explained that application processing time can be very variable. Informed family that Santos will most likely be staying on this inpatient unit during this time.   - Clifford expressed that their family would like to be the power of /have conservatorship for Santos.   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 Reports that he has bills that he has not paid.   - Maria C is planning to move to [place], and Clifford currently lives in [place]. Family prefer that Santos would in a facility that are near these locations. Discussed with family that they can also try to request a specific location (memory care).   - Clifford reports that he had previously gotten help applying for his social security and medicare, but unsure if it had been approved.   - Informed family that there is a geriatric unit and there might be a transfer if there becomes availability on this unit.   - Family shared that he was completely different just two months ago.   - Estefani shared that his family found his medications in his old truck recently, expressed that it was likely that he was not taking his medications prior to his hospitalization.      11/6/24: updated Maria C on plan to try and transfer Santos to Geriatric unit.      11/11/24: updated Maria C on neurology consult      The risks, benefits, alternatives, and side effects were discussed and understood  by the patient and other caregivers.     Medical Assessment and Plan     Medical diagnoses to be addressed this admission:    # Hypertension  - Continue PTA medications  Furosemide 40 mg daily  Lisinopril 40 mg daily  Metoprolol 50 mg daily  Amlodipine 10 mg daily  Clonidine 0.1 mg BID     # Hyperlipidemia  - Continue PTA Atorvastatin 40 mg     # Primary Hyperparathyroidism  # Hypercalcemia, hypophosphoremia   Increased Ca level to 10.9 and decreased Ph level to 2.3 in the ED. Suspicion patient's hypercalcemia could be contributing to symptoms of psychosis.  - Consulted endocrinology, who started cinacalcet 30 mg BID on 10/13  - 10/16 endocrinology recommended continuing to trend calcium and albumin to make sure patient does not become hypocalcemic and recommended holding cinacalcet   - 10/17, calcium and albumin wnl, endo recommended cinacalcet 30 mg once daily   - 10/21, per endo continue cincaclcet and recheck calcium and albumin on October 24  - 10/23: per endo, patient needs to follow up outpatient for further management of hyperparathyroidism      Medical course: Patient was physically examined by the ED prior to being transferred to the unit and was found to be medically stable and appropriate for admission.      Consults: Psychiatry, Endocrinology (follow-up of hyperthyroidism / hypercalcemia and hypertension), neurology     Checklist     Legal Status: Committed   MI Commitment with [facility]  File Number: [id number]  Start and expiration date of commitment: 10/24/24 - 04/24/25     [facility] meds: Haldol, Clozaril, Risperdal, Invega, Zyprexa, Seroquel, Abilify     PPS/CM:  Shelby Chowdary: [phone]  [email or web address].    Safety Assessment:   Behavioral Orders   Procedures    Code 1 - Restrict to Unit    Routine Programming     As clinically indicated    Status 15     Every 15 minutes.    Status Individual Observation     1:1 during evening shift, & night shift.    If patient  refuses overnight presence of staff in his room, it's ok to do 15 min checks through the window blinds.    Patient SIO status reviewed with team/RN.  Please also refer to RN/team documentation for add'l detail.    -SIO staff to monitor following which have contributed to patient being on SIO:  Pt has psychosis regarding being followed and attacked, very paranoid, HI    -Possible interventions SIO staff could use to support patient's treatment progress:  Redirect and reassure  -When following observed, team will review discontinuation of SIO:  Psychosis improves     Order Specific Question:   CONTINUOUS 24 hours / day     Answer:   Other     Order Specific Question:   Specify distance     Answer:   10 feet, 5 feet when close to the doors     Order Specific Question:   Indications for SIO     Answer:   Assault risk     Order Specific Question:   Indications for SIO     Answer:   Severe intrusiveness       Risk Assessment:  Risk for harm is low  Risk factors: impulsive and past behaviors  Protective factors: family      Disposition: Pending stabilization, medication optimization, & development of a safe discharge plan.     Attestations     Resident without student: This patient was seen and discussed with my attending physician.    Rocío Orellana MD  [facility] Psychiatry Resident  12/12/2024

## 2024-12-12 NOTE — PLAN OF CARE
BEH IP Unit Acuity Rating Score (UARS)  Patient is given one point for every criteria they meet.    CRITERIA SCORING   On a 72 hour hold, court hold, committed, stay of commitment, or revocation. 1    Patient LOS on BEH unit exceeds 20 days. 1  LOS: 61   Patient under guardianship, 55+, otherwise medically complex, or under age 11. 1   Suicide ideation without relief of precipitating factors. 0   Current plan for suicide. 0   Current plan for homicide. 0   Imminent risk or actual attempt to seriously harm another without relief of factors precipitating the attempt. 1   Severe dysfunction in daily living (ex: complete neglect for self care, extreme disruption in vegetative function, extreme deterioration in social interactions). 1   Recent (last 7 days) or current physical aggression in the ED or on unit. 0   Restraints or seclusion episode in past 72 hours. 0   Recent (last 7 days) or current verbal aggression, agitation, yelling, etc., while in the ED or unit. 0   Active psychosis. 1   Need for constant or near constant redirection (from leaving, from others, etc).  0   Intrusive or disruptive behaviors. 1   Patient requires 3 or more hours of individualized nursing care per 8-hour shift (i.e. for ADLs, meds, therapeutic interventions). 1   TOTAL 8

## 2024-12-12 NOTE — PLAN OF CARE
Problem: Adult Behavioral Health Plan of Care  Goal: Adheres to Safety Considerations for Self and Others  Intervention: Develop and Maintain Individualized Safety Plan  Recent Flowsheet Documentation  Taken 12/12/2024 1626 by Tamie Fraser RN  Safety Measures: safety rounds completed  Intervention: Develop and Maintain Individualized Safety Plan  Recent Flowsheet Documentation  Taken 12/12/2024 1626 by Tamie Fraser RN  Safety Measures: safety rounds completed  Goal: Absence of New-Onset Illness or Injury  Intervention: Identify and Manage Fall Risk  Recent Flowsheet Documentation  Taken 12/12/2024 1626 by Tamie Fraser RN  Safety Measures: safety rounds completed     Problem: Psychotic Signs/Symptoms  Goal: Improved Behavioral Control (Psychotic Signs/Symptoms)  Intervention: Manage Behavior  Recent Flowsheet Documentation  Taken 12/12/2024 1626 by Tamie Fraser RN  De-Escalation Techniques: verbally redirected  Goal: Increased Participation and Engagement (Psychotic Signs/Symptoms)  Intervention: Facilitate Participation and Engagement  Recent Flowsheet Documentation  Taken 12/12/2024 1626 by Tamie Fraser RN  Diversional Activity: television  Intervention: Implement Least Restrictive Safety Strategies  Recent Flowsheet Documentation  Taken 12/12/2024 1626 by Tamie Fraser RN  De-Escalation Techniques: verbally redirected  Safety Measures: safety rounds completed  Intervention: Protect Skin and Joint Integrity  Recent Flowsheet Documentation  Taken 12/12/2024 1626 by Tamie Fraser RN  Body Position: position changed independently     Problem: Manic or Hypomanic Signs/Symptoms  Goal: Improved Impulse Control (Manic/Hypomanic Signs/Symptoms)  Intervention: Promote Behavior and Impulse Control  Recent Flowsheet Documentation  Taken 12/12/2024 1626 by Tamie Fraser RN  De-Escalation Techniques: verbally redirected  Diversional Activity: television  Goal: Improved Mood  Symptoms (Manic/Hypomanic Signs/Symptoms)  Intervention: Optimize Emotion and Mood  Recent Flowsheet Documentation  Taken 12/12/2024 1626 by Tamie Fraser RN  Diversional Activity: television  Goal: Improved Psychomotor Symptoms (Manic/Hypomanic Signs/Symptoms)  Intervention: Manage Psychomotor Movement  Recent Flowsheet Documentation  Taken 12/12/2024 1626 by Tamie Fraser RN  Diersional Activity: television  Activity (Behavioral Health):   activity encouraged   up ad diya   Goal Outcome Evaluation:     Pt  was visible in the lounge watching TV with select peers. Pt was pleasant and cooperative on approach. Pt denies pain all mental health and psych symptoms. Nutrition and hydration adequate.  Pt was compliant with medicatios. No behavioral concerns. No safety concerns.

## 2024-12-13 LAB
ALBUMIN SERPL BCG-MCNC: 4.2 G/DL (ref 3.5–5.2)
ALP SERPL-CCNC: 127 U/L (ref 40–150)
ALT SERPL W P-5'-P-CCNC: 39 U/L (ref 0–70)
ANION GAP SERPL CALCULATED.3IONS-SCNC: 10 MMOL/L (ref 7–15)
AST SERPL W P-5'-P-CCNC: 29 U/L (ref 0–45)
BILIRUB SERPL-MCNC: 0.9 MG/DL
BUN SERPL-MCNC: 17.4 MG/DL (ref 8–23)
CA-I BLD-MCNC: 5.3 MG/DL (ref 4.4–5.2)
CALCIUM SERPL-MCNC: 10.5 MG/DL (ref 8.8–10.4)
CHLORIDE SERPL-SCNC: 101 MMOL/L (ref 98–107)
CREAT SERPL-MCNC: 0.85 MG/DL (ref 0.67–1.17)
EGFRCR SERPLBLD CKD-EPI 2021: >90 ML/MIN/1.73M2
GLUCOSE SERPL-MCNC: 94 MG/DL (ref 70–99)
HCO3 SERPL-SCNC: 27 MMOL/L (ref 22–29)
POTASSIUM SERPL-SCNC: 4.1 MMOL/L (ref 3.4–5.3)
PROT SERPL-MCNC: 7 G/DL (ref 6.4–8.3)
PTH-INTACT SERPL-MCNC: 64 PG/ML (ref 15–65)
SODIUM SERPL-SCNC: 138 MMOL/L (ref 135–145)

## 2024-12-13 PROCEDURE — 83970 ASSAY OF PARATHORMONE: CPT | Performed by: STUDENT IN AN ORGANIZED HEALTH CARE EDUCATION/TRAINING PROGRAM

## 2024-12-13 PROCEDURE — 250N000013 HC RX MED GY IP 250 OP 250 PS 637

## 2024-12-13 PROCEDURE — 36415 COLL VENOUS BLD VENIPUNCTURE: CPT | Performed by: STUDENT IN AN ORGANIZED HEALTH CARE EDUCATION/TRAINING PROGRAM

## 2024-12-13 PROCEDURE — 124N000002 HC R&B MH UMMC

## 2024-12-13 PROCEDURE — 82040 ASSAY OF SERUM ALBUMIN: CPT | Performed by: STUDENT IN AN ORGANIZED HEALTH CARE EDUCATION/TRAINING PROGRAM

## 2024-12-13 PROCEDURE — 82330 ASSAY OF CALCIUM: CPT | Performed by: STUDENT IN AN ORGANIZED HEALTH CARE EDUCATION/TRAINING PROGRAM

## 2024-12-13 PROCEDURE — 99232 SBSQ HOSP IP/OBS MODERATE 35: CPT | Mod: GC | Performed by: STUDENT IN AN ORGANIZED HEALTH CARE EDUCATION/TRAINING PROGRAM

## 2024-12-13 RX ADMIN — CLONIDINE HYDROCHLORIDE 0.1 MG: 0.1 TABLET ORAL at 08:27

## 2024-12-13 RX ADMIN — Medication 1 PATCH: at 08:34

## 2024-12-13 RX ADMIN — CLONIDINE HYDROCHLORIDE 0.1 MG: 0.1 TABLET ORAL at 21:07

## 2024-12-13 RX ADMIN — METOPROLOL SUCCINATE 50 MG: 50 TABLET, EXTENDED RELEASE ORAL at 08:27

## 2024-12-13 RX ADMIN — ATORVASTATIN CALCIUM 40 MG: 20 TABLET, FILM COATED ORAL at 08:27

## 2024-12-13 RX ADMIN — CINACALCET 30 MG: 30 TABLET ORAL at 08:27

## 2024-12-13 RX ADMIN — OLANZAPINE 5 MG: 5 TABLET, ORALLY DISINTEGRATING ORAL at 21:06

## 2024-12-13 RX ADMIN — MELATONIN TAB 3 MG 3 MG: 3 TAB at 21:06

## 2024-12-13 RX ADMIN — FUROSEMIDE 40 MG: 40 TABLET ORAL at 08:27

## 2024-12-13 RX ADMIN — AMLODIPINE BESYLATE 10 MG: 5 TABLET ORAL at 08:27

## 2024-12-13 RX ADMIN — LISINOPRIL 40 MG: 10 TABLET ORAL at 08:27

## 2024-12-13 ASSESSMENT — ACTIVITIES OF DAILY LIVING (ADL)
HYGIENE/GROOMING: INDEPENDENT
ADLS_ACUITY_SCORE: 54
ORAL_HYGIENE: INDEPENDENT
ADLS_ACUITY_SCORE: 54
DRESS: INDEPENDENT
HYGIENE/GROOMING: INDEPENDENT
LAUNDRY: WITH SUPERVISION
ADLS_ACUITY_SCORE: 54
ORAL_HYGIENE: INDEPENDENT
ADLS_ACUITY_SCORE: 54
DRESS: INDEPENDENT

## 2024-12-13 NOTE — PLAN OF CARE
BEH IP Unit Acuity Rating Score (UARS)  Patient is given one point for every criteria they meet.    CRITERIA SCORING   On a 72 hour hold, court hold, committed, stay of commitment, or revocation. 1    Patient LOS on BEH unit exceeds 20 days. 1  LOS: 62   Patient under guardianship, 55+, otherwise medically complex, or under age 11. 1   Suicide ideation without relief of precipitating factors. 0   Current plan for suicide. 0   Current plan for homicide. 0   Imminent risk or actual attempt to seriously harm another without relief of factors precipitating the attempt. 1   Severe dysfunction in daily living (ex: complete neglect for self care, extreme disruption in vegetative function, extreme deterioration in social interactions). 1   Recent (last 7 days) or current physical aggression in the ED or on unit. 0   Restraints or seclusion episode in past 72 hours. 0   Recent (last 7 days) or current verbal aggression, agitation, yelling, etc., while in the ED or unit. 0   Active psychosis. 1   Need for constant or near constant redirection (from leaving, from others, etc).  0   Intrusive or disruptive behaviors. 1   Patient requires 3 or more hours of individualized nursing care per 8-hour shift (i.e. for ADLs, meds, therapeutic interventions). 1   TOTAL 8

## 2024-12-13 NOTE — PLAN OF CARE
"  Problem: Adult Behavioral Health Plan of Care  Goal: Optimized Coping Skills in Response to Life Stressors  Outcome: Not Progressing     Problem: Sleep Disturbance  Goal: Adequate Sleep/Rest  Outcome: Not Progressing     Problem: Adult Behavioral Health Plan of Care  Goal: Adheres to Safety Considerations for Self and Others  Outcome: Progressing  Intervention: Develop and Maintain Individualized Safety Plan  Recent Flowsheet Documentation  Taken 12/13/2024 1100 by Rocío Curry RN  Safety Measures:   safety rounds completed   environmental rounds completed  Intervention: Develop and Maintain Individualized Safety Plan  Recent Flowsheet Documentation  Taken 12/13/2024 1100 by Rocío Curry RN  Safety Measures:   safety rounds completed   environmental rounds completed  Goal: Absence of New-Onset Illness or Injury  Outcome: Progressing  Intervention: Identify and Manage Fall Risk  Recent Flowsheet Documentation  Taken 12/13/2024 1100 by Rocío Curry RN  Safety Measures:   safety rounds completed   environmental rounds completed     Problem: Psychotic Signs/Symptoms  Goal: Improved Behavioral Control (Psychotic Signs/Symptoms)  Outcome: Progressing  Intervention: Manage Behavior  Recent Flowsheet Documentation  Taken 12/13/2024 1100 by Rocío Curry RN  De-Escalation Techniques: verbally redirected     Problem: Depression  Goal: Improved Mood  Outcome: Progressing   Goal Outcome Evaluation:    Plan of Care Reviewed With: patient       Pt is visible in the milieu. He is more alert this shift. After speaking with the team, he spread his MRI images at the dining area, showing them to staff and some peers and saying \"they say I have dementia. I am sheyla'na fight it\". He showed some confusion when he told one staff that he has known her for years. At times he  would ask questions and unable to complete his question. One time pt came to the desk and asked this writer \"Do I " "have dementia?\" Appears to deny and irritable about the diagnosis.  Pt is pleasant and cooperative, interacts with staff and peers appropriately, declined groups. Pt nutrition and hydration is adequate. Observed to be snacking on fruits. Pt denied anxiety, depression and hallucinations.     "

## 2024-12-13 NOTE — PLAN OF CARE
Problem: Sleep Disturbance  Goal: Adequate Sleep/Rest  Outcome: Progressing    Pt appears to have slept for 3.25 hours.  He woke up intermittently and sat on his bed. He attempted to enter other patients rooms, but was easily redirected by staff.  Pt denies pain, anxiety, depression, and SI/SIB. No PRN medications were given.  15 minutes safety checks were in place. Staff will continue to offer support to pt.

## 2024-12-13 NOTE — PROGRESS NOTES
"  ----------------------------------------------------------------------------------------------------------  [facility]  Psychiatry Progress Note  Hospital Day #62     Interim History:     The patient's care was discussed with the treatment team and chart notes were reviewed.    Identifier: Estevan Aaron is a 64 year old male with previous psychiatric diagnoses of unspecified anxiety vs generalized anxiety disorder admitted from the ED 10/12/2024 due to concern for HI and psychosis in the context of medical issues (hyperthyroidism, hypercalcemia) psychosocial stressors including family dynamics with recent possible divorce.    Vitals: VSS  Sleep: 3.25 hours (12/13/24 0600)  Scheduled medications: Took all scheduled medications as prescribed  Psychiatric PRN medications: none    Staff Report:   Patient woke up intermittently and sat on his bed. He attempted to enter other patients rooms, but was easily redirected by staff.  Pt denies pain, anxiety, depression, and SI/SIB. No PRN medications were given.     See staff notes for more information     Subjective:     Patient Interview:  Santos says he is doing well this morning, states he was coloring.  He mentions he had a good day, and he will go to a couple of groups today. Santos says he took shower yesterday and it was  \"stuck.\" Says he does not have any questions/concerns for the team. Team let him know to reach out if he needs anything.    ROS:  See above     Objective:     Vitals:  /77   Pulse 62   Temp 97.5  F (36.4  C) (Temporal)   Resp 18   Wt 110 kg (242 lb 8 oz)   SpO2 98%   BMI 39.14 kg/m      Allergies:  No Known Allergies    Current Medications:  Scheduled:  Current Facility-Administered Medications   Medication Dose Route Frequency Provider Last Rate Last Admin    acetaminophen (TYLENOL) tablet 650 mg  650 mg Oral Q4H PRN Nikki Caceres MD   650 mg at 11/30/24 1133    alum & mag " hydroxide-simethicone (MAALOX) suspension 30 mL  30 mL Oral Q4H PRN Nikki Caceres MD   30 mL at 10/17/24 0837    amLODIPine (NORVASC) tablet 10 mg  10 mg Oral Daily Presley Barrera MD   10 mg at 12/13/24 0827    atorvastatin (LIPITOR) tablet 40 mg  40 mg Oral Daily Nikki Caceres MD   40 mg at 12/13/24 0827    cholecalciferol (VITAMIN D3) capsule 1,250 mcg  1,250 mcg Oral Q7 Days Evelia Grimm DO   1,250 mcg at 12/10/24 1233    cinacalcet (SENSIPAR) tablet 30 mg  30 mg Oral Daily Presley Barrera MD   30 mg at 12/13/24 0827    cloNIDine (CATAPRES) tablet 0.1 mg  0.1 mg Oral BID Presley Barrera MD   0.1 mg at 12/13/24 0827    furosemide (LASIX) tablet 40 mg  40 mg Oral Daily Presley Barrera MD   40 mg at 12/13/24 0827    gabapentin (NEURONTIN) capsule 100 mg  100 mg Oral Q6H PRN Nikki Caceres MD   100 mg at 11/15/24 0418    hydrocortisone (CORTAID) 0.5 % cream   Topical Daily PRN Savi Chamorro MD        lisinopril (ZESTRIL) tablet 40 mg  40 mg Oral Daily Presley Barrera MD   40 mg at 12/13/24 0827    melatonin tablet 3 mg  3 mg Oral At Bedtime Presley Barrera MD   3 mg at 12/12/24 2021    metoprolol succinate ER (TOPROL XL) 24 hr tablet 50 mg  50 mg Oral Daily Presley Barrera MD   50 mg at 12/13/24 0827    nicotine (NICODERM CQ) 14 MG/24HR 24 hr patch 1 patch  1 patch Transdermal Daily Evelia Grimm DO   1 patch at 12/13/24 0834    nicotine (NICORETTE) gum 2 mg  2 mg Buccal Q1H PRN González Garcia MD   2 mg at 10/24/24 1254    OLANZapine zydis (zyPREXA) ODT tab 5 mg  5 mg Oral At Bedtime González Garcia MD   5 mg at 12/12/24 2021    Or    OLANZapine (zyPREXA) injection 10 mg  10 mg Intramuscular At Bedtime González Garcia MD        OLANZapine (zyPREXA) tablet 5 mg  5 mg Oral TID PRN Evelia Grimm DO   5 mg at 11/24/24 0436    Or    OLANZapine (zyPREXA) injection 5 mg  5 mg Intramuscular TID PRN Evelia Grimm DO        polyethylene glycol (MIRALAX) Packet 17 g  17 g Oral Daily PRN Nicki,  MD Nikki        traZODone (DESYREL) half-tab 25 mg  25 mg Oral At Bedtime PRN Evelia Grimm DO   25 mg at 11/29/24 0148    Or    traZODone (DESYREL) tablet 50 mg  50 mg Oral At Bedtime PRN Evelia Grimm DO           PRN:  Current Facility-Administered Medications   Medication Dose Route Frequency Provider Last Rate Last Admin    acetaminophen (TYLENOL) tablet 650 mg  650 mg Oral Q4H PRN Nikki Caceres MD   650 mg at 11/30/24 1133    alum & mag hydroxide-simethicone (MAALOX) suspension 30 mL  30 mL Oral Q4H PRN Nikki Caceres MD   30 mL at 10/17/24 0837    amLODIPine (NORVASC) tablet 10 mg  10 mg Oral Daily Presley Barrera MD   10 mg at 12/13/24 0827    atorvastatin (LIPITOR) tablet 40 mg  40 mg Oral Daily Nikki Caceres MD   40 mg at 12/13/24 0827    cholecalciferol (VITAMIN D3) capsule 1,250 mcg  1,250 mcg Oral Q7 Days Evelia Grimm DO   1,250 mcg at 12/10/24 1233    cinacalcet (SENSIPAR) tablet 30 mg  30 mg Oral Daily Presley Barrera MD   30 mg at 12/13/24 0827    cloNIDine (CATAPRES) tablet 0.1 mg  0.1 mg Oral BID Presley Barrera MD   0.1 mg at 12/13/24 0827    furosemide (LASIX) tablet 40 mg  40 mg Oral Daily Presley Barrera MD   40 mg at 12/13/24 0827    gabapentin (NEURONTIN) capsule 100 mg  100 mg Oral Q6H PRN Nikki Caceres MD   100 mg at 11/15/24 0418    hydrocortisone (CORTAID) 0.5 % cream   Topical Daily PRN Savi Chamorro MD        lisinopril (ZESTRIL) tablet 40 mg  40 mg Oral Daily Presley Barrera MD   40 mg at 12/13/24 0827    melatonin tablet 3 mg  3 mg Oral At Bedtime Presley Barrera MD   3 mg at 12/12/24 2021    metoprolol succinate ER (TOPROL XL) 24 hr tablet 50 mg  50 mg Oral Daily Presley Barrera MD   50 mg at 12/13/24 0827    nicotine (NICODERM CQ) 14 MG/24HR 24 hr patch 1 patch  1 patch Transdermal Daily Evelia Grimm,    1 patch at 12/13/24 0834    nicotine (NICORETTE) gum 2 mg  2 mg Buccal Q1H PRN González Garcia MD   2 mg at 10/24/24 1254    OLANZapine zydis  (zyPREXA) ODT tab 5 mg  5 mg Oral At Bedtime González Garcia MD   5 mg at 12/12/24 2021    Or    OLANZapine (zyPREXA) injection 10 mg  10 mg Intramuscular At Bedtime González Garcia MD        OLANZapine (zyPREXA) tablet 5 mg  5 mg Oral TID PRN Evelia Grimm,    5 mg at 11/24/24 0436    Or    OLANZapine (zyPREXA) injection 5 mg  5 mg Intramuscular TID PRN Evelia Grimm DO        polyethylene glycol (MIRALAX) Packet 17 g  17 g Oral Daily PRN Nikki Caceres MD        traZODone (DESYREL) half-tab 25 mg  25 mg Oral At Bedtime PRN Evelia Grimm DO   25 mg at 11/29/24 0148    Or    traZODone (DESYREL) tablet 50 mg  50 mg Oral At Bedtime PRN Evelia Grimm,          Labs and Imaging:  Data this admission:  - CBC unremarkable  - CMP unremarkable  - TSH normal  - UDS negative  - Vit D low  - Hgb A1c 5.9 (10/13/24)  - Lipids unremarkable  - Vit B12 normal  - Folate normal  - Urinalysis unremarkable  - EKG normal sinus rhythm, QTc 390 ms  - Head CT showed no acute changes  - HIV non reactive  - Treponema antibody non reactive  - ESR wnl   - Ceruloplasmin wnl   - BOOGIE negative  - Lyme negative      Parathyroid hormone:   10/13: 85     Calcium:  10/14: 11.4  10/16: 10.3  10/17: 10.3  10/19: 9.8  10/21: 10.6  10/23: 10.3     Albumin:  10/14: 4.3  10/16: 4.3  10/17: 4.1  10/19: 4.2  10/21: 4.5  10/23: 4.3      CXR:   Impression:   1. No definite radiographic evidence of asbestosis. If clinically  indicated, consider evaluation with high resolution chest CT.  2. Nonspecific left costophrenic blunting. Given symmetrically low  lung volumes may be related to poor inspiratory effort/timing.  3. Pulmonary vascular congestion.     MRI:   IMPRESSION:  1. No acute intracranial pathology.  2. No focal lesion or structural abnormality.   3. Symmetric frontoparietal cortical volume loss slightly more than  expected for age.     Mental Status Exam:   Oriented to: Oriented to self and time only  General:  Awake and Alert  Appearance:   "appears stated age, Grooming is adequate, and Dressed in his own clothes  Behavior/Attitude:  Engaged, Bizarre, Easy to redirect, and Easily distracted  Eye Contact: Appropriate for conversation  Psychomotor: No evidence of tics, dystonia, or tardive dyskinesia  no catatonia present  Speech:  appropriate volume/tone  Language: Fluent in English with appropriate syntax and vocabulary.  Mood:  \"good\"  Affect:  confused  Thought Process:  tangential, disorganized, confused, and incoherent  Thought Content:   did not assess SI/HI/ delusion of confusion . Patient denies paranoia  Associations:  loose  Insight:  impaired   Judgment:  impaired   Impulse control: limited  Attention Span:   decreased  Concentration:   distractible  Recent and Remote Memory:   remote memory intact, recent memory impaired  Fund of Knowledge: average  Muscle Strength and Tone: normal  Gait and Station: Normal     Psychiatric Assessment     Estevan Aaron is a 64 year old male with previous psychiatric diagnoses of GEORGINA admitted from the ER on 10/12/2024 due to concern for HI and psychosis in the context of medical issues (hyperthyroidism, hypercalcemia), psychosocial stressors including with recent divorce and selling his home. This is the patient's first psychiatric hospitalization. Significant symptoms on admission included delusions of persecution with grandiose beliefs, homicidal ideations, as well as disorganized thinking and behavior. The MSE on admission was pertinent for a thought process which was perseverative, circumstantial, tangential, disorganized and tangential; with rambling and looseness of associations. Psychological contributions to presentation included lack of insight. Social factors contributing to presentation included isolation, recent divorce, selling his house, and moving from hotel to hotel. Biological contributions to presentation included a history of hyperparathyroidism with chronic hypercalcemia per charts as well " as a history of methamphetamine use per collateral from ex-wife.     History was difficult to obtain due to the patient's severe disorganized thinking on interview; he was observed with persecutory delusions pertaining to the realtor and gang members, disorganized behavior as these delusions have led him to flee to different hotels to stay safe/ has called  complaining of being targeted and auditory hallucinations of voices for the past 12 months. Per collateral from ex-wife, Santos has had paranoid ideations since they first met. He has always felt like people are out to get him or trying to rip him off. She says that he has had visual hallucinations (he will point things out that the rest of the family can't see) for a long time, but the family would just go along with him to avoid making him angry. This timeline and presentation could be consistent with diagnosis of paranoid personality disorder. Things began to worsen around the beginning of the COVID pandemic, when Santos became more isolated and the family started to notice cognitive issues such as impaired memory. Immediately prior to this hospitalization, the ex-wife found Santos in a hotel room with a generator full of gasoline, binoculars, and hunting equipment. This time course does not suggest acute psychosis, however given the patient has never had a full psychiatric work-up, we completed a first-episode psychosis work-up.      Other things that could be contributing to his presentation is hyperparathyroidism with hypercalcemia. However, patient's calcium returned to normal limits on 10/16, and patient continues to have psychosis so likely not a significant contributing factor to patient's presentation. Still we will assess with new labs tomorrow.     Patient also continues to be disoriented and his behaviors appear to worsen in the evenings, this seems to be due to current Neurocognitive Disorder diagnosed, this could evolved to be his new baseline. Patient  currently with no more improvement of bizarre behaviors with current neuroleptic dose, patient probably wont benefit from any modification in current therapy. Still patient did not present with any new episodes of physical agitation and is able to attend groups and interact with peers without major altercations. Today Memory care team interview with Psychiatry team regarding Santos's progress.    Goal of treatment: pending placement to a memory care facility long term. CTC will reach out to second option for placement and discuss next week.        Psychiatric Plan by Diagnosis   Today's changes:  - none    # Major Neurocognitive Disorder  # Rule out paranoid personality disorder  1. Medications:  - Olanzapine 5 mg at bedtime  - Neurology consult 11/8/24, recommend outpatient follow-up and neuropsychiatric testing     2. Pertinent Labs/Monitoring:   - Re-eval Calcium parameters tomorrow     3. Additional Plans:  - Patient will be treated in therapeutic milieu with appropriate individual and group therapies as described  - Patient is Commitment with [facility]  - [facility] meds: Haldol, Clozaril, Risperdal, Invega, Zyprexa, Seroquel, Abilify  - 12/12/24: Psych team had phone call meeting with Memory Care facility personnel     # Unspecified anxiety vs Generalized Anxiety Disorder  - Monitor for symptoms.  - Fluoxetine held due to suspicion of ongoing manic symptoms     Psychiatric Hospital Course:      Estevan Aaron was admitted to [facility] on a 72 hour hold.   Medications:  PTA fluoxetine was held due to concern for worsening of zelalem   New medications started at the time of admission include Zyprexa.   Increased olanzapine 10 mg at bedtime was to 10 mg BID (10/14)   Increased olanzapine 10 mg BID to 10 mg during day and 15 mg at bedtime (10/15)  10/21: increased olanzapine pm dose from 15 to 20 mg  10/23: started melatonin 3 mg at 7 pm to help patient with circadian rhythm as he has been staying up throughout the night and  sleeping a lot during the day and there is some concern for delirium   10/24: consulted anesthesia for MRI brain   10/28: MRI brain complete, decrease AM olanzapine from 10 to 5 mg  10/29: Morning olanzapine decreased to 2.5 mg, evening olanzapine decreased to 15 mg   11/1: Decreased evening olanzapine to 10 mg   11/4: Decreased evening olanzapine to 7.5 mg   11/5: Decreased evening olanzapine to 5 mg   11/11: Moved morning dose of olanzapine to the afternoon as patient appears more agitated in the afternoons/evenings  11/21: Started memantine 5 mg daily   11/26: Discontinued olanzapine 2.5 mg during the afternoon  12/2:   Discontinued memantine 5 mg, daily     Care conference 10/18/24 with daughter Maria C and ex-wife Clifford  - Report that patient has always been paranoid since ex-wife first met him. She describes him always feeling like he is getting ripped off.   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 They reported once incidence in which he made a bid on a job and the client paid him, but he never ended up finishing the job  - Wife and him  officially this year, and since selling the house several months ago, patient has been moving from hotel to hotel due to thinking people are out to get him   - When they were selling their house, patient threatened realtors and felt he was being ripped off   - Clifford reports that she started to be afraid to be around him alone  - When he was found in the hotel, he had a generator full of gasoline, binoculars, bullets, and hunting  equipment.     10/21/24: updated daughter on Santos's treatment plan      10/23/24: updated daughter on Santos's treatment plan      10/25/24: updated daughter on Santos's treatment plan, including plan to try and get MRI brain done under sedation. She said that Santos's grandfather had dementia and his sister has a brain tumor.      10/28/24: updated daughter on Santos's MRI results. She is planning on coming into town soon.      Care conference 11/1/24 with daughters Mraia C and Estefani and ex-wife Clifford  - Discussed dementia diagnosis   - Clifford asked about plans moving forward. Explained that applying for medical assistance is the first step. Explained that application processing time can be very variable. Informed family that Santos will most likely be staying on this inpatient unit during this time.   - Clifford expressed that their family would like to be the power of /have conservatorship for Santos.   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 Reports that he has bills that he has not paid.   - Maria C is planning to move to [place], and Clifford currently lives in [place]. Family prefer that Santos would in a facility that are near these locations. Discussed with family that they can also try to request a specific location (memory care).   - Clifford reports that he had previously gotten help applying for his social security and medicare, but unsure if it had been approved.   - Informed family that there is a geriatric unit and there might be a transfer if there becomes availability on this unit.   - Family shared that he was completely different just two months ago.   - Estefani shared that his family found his medications in his old truck recently, expressed that it was likely that he was not taking his medications prior to his hospitalization.      11/6/24: updated Maria C on plan to try and transfer Santos to Geriatric unit.      11/11/24: updated Maria C on  neurology consult      The risks, benefits, alternatives, and side effects were discussed and understood by the patient and other caregivers.     Medical Assessment and Plan     Medical diagnoses to be addressed this admission:    # Hypertension  - Continue PTA medications  Furosemide 40 mg daily  Lisinopril 40 mg daily  Metoprolol 50 mg daily  Amlodipine 10 mg daily  Clonidine 0.1 mg BID     # Hyperlipidemia  - Continue PTA Atorvastatin 40 mg     # Primary Hyperparathyroidism  # Hypercalcemia, hypophosphoremia   Increased Ca level to 10.9 and decreased Ph level to 2.3 in the ED. Suspicion patient's hypercalcemia could be contributing to symptoms of psychosis.  - Consulted endocrinology, who started cinacalcet 30 mg BID on 10/13  - 10/16 endocrinology recommended continuing to trend calcium and albumin to make sure patient does not become hypocalcemic and recommended holding cinacalcet   - 10/17, calcium and albumin wnl, endo recommended cinacalcet 30 mg once daily   - 10/21, per endo continue cincaclcet and recheck calcium and albumin on October 24  - 10/23: per endo, patient needs to follow up outpatient for further management of hyperparathyroidism      Medical course: Patient was physically examined by the ED prior to being transferred to the unit and was found to be medically stable and appropriate for admission.      Consults: Psychiatry, Endocrinology (follow-up of hyperthyroidism / hypercalcemia and hypertension), neurology     Checklist     Legal Status: Committed   MI Commitment with [facility]  File Number: [id number]  Start and expiration date of commitment: 10/24/24 - 04/24/25     [facility] meds: Haldol, Clozaril, Risperdal, Invega, Zyprexa, Seroquel, Abilify     PPS/CM:  Shelby Chowdary: [phone]  [email or web address].    Safety Assessment:   Behavioral Orders   Procedures    Code 1 - Restrict to Unit    Routine Programming     As clinically indicated    Status 15     Every  15 minutes.       Risk Assessment:  Risk for harm is low  Risk factors: impulsive and past behaviors  Protective factors: family      Disposition: Pending stabilization, medication optimization, & development of a safe discharge plan.     Attestations     Resident without student: This patient was seen and discussed with my attending physician.    Rocío Orellana MD  [facility] Psychiatry Resident  12/13/2024

## 2024-12-14 PROCEDURE — 250N000013 HC RX MED GY IP 250 OP 250 PS 637

## 2024-12-14 PROCEDURE — 124N000002 HC R&B MH UMMC

## 2024-12-14 RX ADMIN — CLONIDINE HYDROCHLORIDE 0.1 MG: 0.1 TABLET ORAL at 08:01

## 2024-12-14 RX ADMIN — CINACALCET 30 MG: 30 TABLET ORAL at 08:00

## 2024-12-14 RX ADMIN — MELATONIN TAB 3 MG 3 MG: 3 TAB at 20:28

## 2024-12-14 RX ADMIN — OLANZAPINE 5 MG: 5 TABLET, ORALLY DISINTEGRATING ORAL at 20:28

## 2024-12-14 RX ADMIN — METOPROLOL SUCCINATE 50 MG: 50 TABLET, EXTENDED RELEASE ORAL at 08:01

## 2024-12-14 RX ADMIN — LISINOPRIL 40 MG: 10 TABLET ORAL at 08:00

## 2024-12-14 RX ADMIN — Medication 1 PATCH: at 08:09

## 2024-12-14 RX ADMIN — AMLODIPINE BESYLATE 10 MG: 5 TABLET ORAL at 08:00

## 2024-12-14 RX ADMIN — FUROSEMIDE 40 MG: 40 TABLET ORAL at 08:01

## 2024-12-14 RX ADMIN — ATORVASTATIN CALCIUM 40 MG: 20 TABLET, FILM COATED ORAL at 08:01

## 2024-12-14 ASSESSMENT — ACTIVITIES OF DAILY LIVING (ADL)
ADLS_ACUITY_SCORE: 64
ADLS_ACUITY_SCORE: 64
HYGIENE/GROOMING: HANDWASHING;INDEPENDENT
ADLS_ACUITY_SCORE: 64
ADLS_ACUITY_SCORE: 64
ADLS_ACUITY_SCORE: 54
ADLS_ACUITY_SCORE: 54
ORAL_HYGIENE: INDEPENDENT;PROMPTS
LAUNDRY: WITH SUPERVISION
ADLS_ACUITY_SCORE: 54
ADLS_ACUITY_SCORE: 64
ADLS_ACUITY_SCORE: 54
ADLS_ACUITY_SCORE: 64
ADLS_ACUITY_SCORE: 54
ADLS_ACUITY_SCORE: 64
ORAL_HYGIENE: INDEPENDENT
ADLS_ACUITY_SCORE: 64
ADLS_ACUITY_SCORE: 54
ADLS_ACUITY_SCORE: 64
HYGIENE/GROOMING: HANDWASHING;SHOWER;INDEPENDENT
ADLS_ACUITY_SCORE: 64
ADLS_ACUITY_SCORE: 54
ADLS_ACUITY_SCORE: 54

## 2024-12-14 NOTE — PLAN OF CARE
Problem: Sleep Disturbance  Goal: Adequate Sleep/Rest  Outcome: Progressing    Pt appears to have slept for 5.5 hours.  He woke up intermittently and sat on the lounge. Pt was severely intrusive during shift, and redirection was not effective. He went to  203 and encouraged the pt to come to the dinning area with him for some discussion( pt said they used to work in the same company long time ago). Even with staff redirection to separate both patients, redirection was not effective. Also, he  attempted to enter other patients rooms, but was easily redirected by staff.  Pt denies pain, anxiety, depression, and SI/SIB. No PRN medications were given.  15 minutes safety checks were in place. Staff will continue to offer support to pt.

## 2024-12-14 NOTE — PLAN OF CARE
Santos has been calm this shift. Pt was out in the lounge watching tv and also looking through a football book. Pt makes delusional statements every ones in a while.  Pt shows some confusion sometimes, pt thinks he knows a new pt and was telling this writer that he used to work together with the new pt in the past. Presents with flat affect. Pt was on the phone few times this shift. No prns administered. Medication compliant. Intake is adequate. Vital signs:Temp: 97.7  F (36.5  C) Temp src: Oral BP: 133/74 Pulse: 56   Resp: 18 SpO2: 98 % O2 Device: None (Room air)     Goal Outcome Evaluation:    Plan of Care Reviewed With: patient      Problem: Depression  Goal: Improved Mood  Outcome: Progressing     Problem: Suicidal Behavior  Goal: Suicidal Behavior is Absent or Managed  Outcome: Progressing     Problem: Seclusion/Restraint, Behavioral  Goal: Seclusion/Behavioral Restraint Goal: Absence of Harm or Injury  Intervention: Implement Least Restrictive Safety Strategies  Recent Flowsheet Documentation  Taken 12/13/2024 1728 by Noah Murray RN  Safety Measures:   safety rounds completed   environmental rounds completed     Problem: Manic or Hypomanic Signs/Symptoms  Goal: Improved Impulse Control (Manic/Hypomanic Signs/Symptoms)  Intervention: Promote Behavior and Impulse Control  Recent Flowsheet Documentation  Taken 12/13/2024 1728 by Noah Murray RN  Diversional Activity:   television   reading  Goal: Improved Mood Symptoms (Manic/Hypomanic Signs/Symptoms)  Intervention: Optimize Emotion and Mood  Recent Flowsheet Documentation  Taken 12/13/2024 1728 by Noah Murray RN  Diversional Activity:   television   reading  Goal: Improved Psychomotor Symptoms (Manic/Hypomanic Signs/Symptoms)  Intervention: Manage Psychomotor Movement  Recent Flowsheet Documentation  Taken 12/13/2024 1728 by Noah Murray RN  Diversional Activity:   television   reading

## 2024-12-14 NOTE — PLAN OF CARE
Goal Outcome Evaluation:    Plan of Care Reviewed With: patient Plan of Care Reviewed With: patient    Overall Patient Progress: improvingOverall Patient Progress: improving           Problem: Adult Behavioral Health Plan of Care  Goal: Plan of Care Review  Outcome: Progressing  Flowsheets  Taken 12/14/2024 1248  Plan of Care Reviewed With: patient  Overall Patient Progress: improving  Patient Agreement with Plan of Care: (pt remains intrusive, poor boundaries and argumentative at times) agrees with comment (describe)  Taken 12/14/2024 1056  Patient Agreement with Plan of Care: agrees    Pt was visible in the milieu majority of the shift. Pt did not attend group but appears to be engages with select peers. Pt is eating and drinking adequately. Hygiene is adequate to situation. Pt denied pain, denied SI/HI/SIB, hallucinations, depression or anxiety. Pt has lack of insight to his mental illness. Pt is alert and oriented x 3. Pt is able to make needs known. Mood is labile. Affect is restricted. Pt has a new order for SIO 1:1 at night due to continue exhibiting severe intrusiveness at night. Blood pressure 132/82, pulse 82, temperature 97.5  F (36.4  C), temperature source Oral, resp. rate 18, weight 110 kg (242 lb 8 oz), SpO2 97%.

## 2024-12-15 PROCEDURE — 250N000013 HC RX MED GY IP 250 OP 250 PS 637

## 2024-12-15 PROCEDURE — 124N000002 HC R&B MH UMMC

## 2024-12-15 RX ADMIN — LISINOPRIL 40 MG: 10 TABLET ORAL at 08:38

## 2024-12-15 RX ADMIN — Medication 1 PATCH: at 08:39

## 2024-12-15 RX ADMIN — ACETAMINOPHEN 650 MG: 325 TABLET, FILM COATED ORAL at 15:41

## 2024-12-15 RX ADMIN — ACETAMINOPHEN 650 MG: 325 TABLET, FILM COATED ORAL at 00:13

## 2024-12-15 RX ADMIN — OLANZAPINE 5 MG: 5 TABLET, ORALLY DISINTEGRATING ORAL at 20:20

## 2024-12-15 RX ADMIN — MELATONIN TAB 3 MG 3 MG: 3 TAB at 20:20

## 2024-12-15 RX ADMIN — CINACALCET 30 MG: 30 TABLET ORAL at 08:39

## 2024-12-15 RX ADMIN — ACETAMINOPHEN 650 MG: 325 TABLET, FILM COATED ORAL at 06:41

## 2024-12-15 RX ADMIN — FUROSEMIDE 40 MG: 40 TABLET ORAL at 08:39

## 2024-12-15 RX ADMIN — CLONIDINE HYDROCHLORIDE 0.1 MG: 0.1 TABLET ORAL at 20:21

## 2024-12-15 RX ADMIN — ATORVASTATIN CALCIUM 40 MG: 20 TABLET, FILM COATED ORAL at 08:38

## 2024-12-15 RX ADMIN — CLONIDINE HYDROCHLORIDE 0.1 MG: 0.1 TABLET ORAL at 08:38

## 2024-12-15 RX ADMIN — AMLODIPINE BESYLATE 10 MG: 5 TABLET ORAL at 08:38

## 2024-12-15 ASSESSMENT — ACTIVITIES OF DAILY LIVING (ADL)
ADLS_ACUITY_SCORE: 64
ORAL_HYGIENE: INDEPENDENT
ADLS_ACUITY_SCORE: 64
LAUNDRY: WITH SUPERVISION
ADLS_ACUITY_SCORE: 64
DRESS: STREET CLOTHES;INDEPENDENT;PROMPTS
ADLS_ACUITY_SCORE: 64
HYGIENE/GROOMING: HANDWASHING;INDEPENDENT
ADLS_ACUITY_SCORE: 64
HYGIENE/GROOMING: HANDWASHING;INDEPENDENT
ADLS_ACUITY_SCORE: 64
ORAL_HYGIENE: INDEPENDENT;PROMPTS

## 2024-12-15 NOTE — PLAN OF CARE
Problem: Psychotic Signs/Symptoms  Goal: Improved Sleep (Psychotic Signs/Symptoms)  Outcome: Progressing  Intervention: Promote Healthy Sleep Hygiene  Sleep Hygiene Promotion:   regular sleep pattern promoted   awakenings minimized   noise level reduced  Goal Outcome Evaluation:  Patient observed in assigned room. in the lounge area, and hallway walking on and off throughout the night. Patient slept intermittently throughout the night in his room and lounge area. Patient reported right hip pain.   PRN medications administered: Acetaminophen 650 mg twice.   Safety rounds completed.   Sleep hours - 5.  Nursing will continue to monitor.

## 2024-12-15 NOTE — PLAN OF CARE
Problem: Adult Behavioral Health Plan of Care  Goal: Plan of Care Review  Outcome: Progressing  Flowsheets (Taken 12/14/2024 1650)  Patient Agreement with Plan of Care: agrees with comment (describe)     Problem: Suicidal Behavior  Goal: Suicidal Behavior is Absent or Managed  Outcome: Progressing   Goal Outcome Evaluation:    Plan of Care Reviewed With: patient        Alert and oriented, intermittently confused, he was able to communicate needs. Presented as labile, irritable at times but redirectable. He was cooperative and medication compliant. Independent with ADLs, he denied any pain or discomfort. Adequate food and fluid intake. Clonidine held due to diastolic BP below parameter. Patient denied anxiety or depression, denied SI/HI.

## 2024-12-15 NOTE — PLAN OF CARE
Goal Outcome Evaluation:    Plan of Care Reviewed With: patient Plan of Care Reviewed With: patient    Overall Patient Progress: improvingOverall Patient Progress: improving           Problem: Adult Behavioral Health Plan of Care  Goal: Plan of Care Review  Outcome: Progressing  Flowsheets  Taken 12/15/2024 1319  Plan of Care Reviewed With: patient  Overall Patient Progress: improving  Patient Agreement with Plan of Care: agrees  Taken 12/15/2024 1300  Patient Agreement with Plan of Care: agrees     Pt presented with calm mood but remain labile. Affect is restricted. Pt was visible in the milieu majority of the shift. Pt appears to be engages with select peers. Pt is eating and drinking adequately. Hygiene is adequate to situation. Pt denied pain, denied SI/HI/SIB, hallucinations, depression or anxiety. Pt has lack of insight to his mental illness. Pt is alert and oriented x 3. Pt is able to make needs known. Pt voiced concerns about his right leg being swollen. Upon assessment, both ankles are +3 pitting edema. Pt has an order to wear compression stocking but declined. Provider was notified. Blood pressure (!) 146/79, pulse 56, temperature 97.9  F (36.6  C), temperature source Oral, resp. rate 18, weight 111.4 kg (245 lb 11.2 oz), SpO2 96%.

## 2024-12-16 PROCEDURE — 250N000013 HC RX MED GY IP 250 OP 250 PS 637

## 2024-12-16 PROCEDURE — 99232 SBSQ HOSP IP/OBS MODERATE 35: CPT | Mod: GC | Performed by: STUDENT IN AN ORGANIZED HEALTH CARE EDUCATION/TRAINING PROGRAM

## 2024-12-16 PROCEDURE — 124N000002 HC R&B MH UMMC

## 2024-12-16 RX ADMIN — CLONIDINE HYDROCHLORIDE 0.1 MG: 0.1 TABLET ORAL at 08:36

## 2024-12-16 RX ADMIN — ACETAMINOPHEN 650 MG: 325 TABLET, FILM COATED ORAL at 00:02

## 2024-12-16 RX ADMIN — ATORVASTATIN CALCIUM 40 MG: 20 TABLET, FILM COATED ORAL at 08:36

## 2024-12-16 RX ADMIN — CINACALCET 30 MG: 30 TABLET ORAL at 08:36

## 2024-12-16 RX ADMIN — FUROSEMIDE 40 MG: 40 TABLET ORAL at 08:37

## 2024-12-16 RX ADMIN — CLONIDINE HYDROCHLORIDE 0.1 MG: 0.1 TABLET ORAL at 21:34

## 2024-12-16 RX ADMIN — Medication 1 PATCH: at 08:44

## 2024-12-16 RX ADMIN — LISINOPRIL 40 MG: 10 TABLET ORAL at 08:36

## 2024-12-16 RX ADMIN — OLANZAPINE 5 MG: 5 TABLET, ORALLY DISINTEGRATING ORAL at 21:35

## 2024-12-16 RX ADMIN — AMLODIPINE BESYLATE 10 MG: 5 TABLET ORAL at 08:36

## 2024-12-16 RX ADMIN — MELATONIN TAB 3 MG 3 MG: 3 TAB at 21:34

## 2024-12-16 RX ADMIN — Medication 25 MG: at 00:00

## 2024-12-16 RX ADMIN — ACETAMINOPHEN 650 MG: 325 TABLET, FILM COATED ORAL at 16:47

## 2024-12-16 RX ADMIN — METOPROLOL SUCCINATE 50 MG: 50 TABLET, EXTENDED RELEASE ORAL at 08:37

## 2024-12-16 RX ADMIN — Medication 25 MG: at 21:35

## 2024-12-16 ASSESSMENT — ACTIVITIES OF DAILY LIVING (ADL)
ADLS_ACUITY_SCORE: 64
ADLS_ACUITY_SCORE: 64
DRESS: WITH SUPERVISION
ADLS_ACUITY_SCORE: 64
HYGIENE/GROOMING: HANDWASHING;INDEPENDENT
ADLS_ACUITY_SCORE: 64
ORAL_HYGIENE: INDEPENDENT
ADLS_ACUITY_SCORE: 64
ADLS_ACUITY_SCORE: 54
ORAL_HYGIENE: INDEPENDENT
HYGIENE/GROOMING: INDEPENDENT
ADLS_ACUITY_SCORE: 54
ADLS_ACUITY_SCORE: 64
LAUNDRY: WITH SUPERVISION
ADLS_ACUITY_SCORE: 54
DRESS: STREET CLOTHES;INDEPENDENT
ADLS_ACUITY_SCORE: 64
LAUNDRY: WITH SUPERVISION
ADLS_ACUITY_SCORE: 64
ADLS_ACUITY_SCORE: 54
ADLS_ACUITY_SCORE: 64
ADLS_ACUITY_SCORE: 54
ADLS_ACUITY_SCORE: 64
ADLS_ACUITY_SCORE: 54

## 2024-12-16 NOTE — PROGRESS NOTES
----------------------------------------------------------------------------------------------------------  [facility]  Psychiatry Progress Note  Hospital Day #65     Interim History:     The patient's care was discussed with the treatment team and chart notes were reviewed.    Identifier: Estevan Aaron is a 64 year old male with previous psychiatric diagnoses of  generalized anxiety disorder, Neurocognitive disorder, admitted from the ED 10/12/2024 due to concern for HI and psychosis in the context of medical issues (hyperthyroidism, hypercalcemia) psychosocial stressors including family dynamics with recent possible divorce.    Vitals: VSS  Sleep: 3.25 hours (12/16/24 0600)  Scheduled medications: Took all scheduled medications as prescribed  Psychiatric PRN medications:   Last 24H PRN:     acetaminophen (TYLENOL) tablet 650 mg, 650 mg at 12/16/24 0002    traZODone (DESYREL) half-tab 25 mg, 25 mg at 12/16/24 0000 **OR** traZODone (DESYREL) tablet 50 mg      Staff Report:   Throughout the weekend, patient presented with calm mood but remain labile, making delusional statements once in a while. Affect is restricted.  He was visible in the milieu and appears to engages with select peers. He is eating and drinking adequately. Independent with ADLs  with adequate hygiene. Yesterday he voiced concerns about his right leg being swollen. Upon assessment, both ankles are +3 pitting edema. Santos was intermittently intrusive, but was easily redirected by the SIO staff. PRN Tylenol was given for generalized body pain and Trazodone was given to promote sleep. Pt denies anxiety, depression, and SI/SIB     See staff notes for more information     Subjective:     Patient Interview:  Santos mentions that he is doing great today. He was able to sleep yesterday and today he ate scramble eggs with potatoes and cucumber. He mentions it was good. Santos says he will go to groups to draw some  more. Denied any other concerns,  team let him know to reach out if something is needed.    ROS:  See above     Objective:     Vitals:  /75 (BP Location: Right arm, Patient Position: Sitting, Cuff Size: Adult Large)   Pulse 70   Temp 97.6  F (36.4  C) (Oral)   Resp 18   Wt 111.4 kg (245 lb 11.2 oz)   SpO2 96%   BMI 39.66 kg/m      Allergies:  No Known Allergies    Current Medications:  Scheduled:  Current Facility-Administered Medications   Medication Dose Route Frequency Provider Last Rate Last Admin    acetaminophen (TYLENOL) tablet 650 mg  650 mg Oral Q4H PRN Nikki Caceres MD   650 mg at 12/16/24 0002    alum & mag hydroxide-simethicone (MAALOX) suspension 30 mL  30 mL Oral Q4H PRN Nikki Caceres MD   30 mL at 10/17/24 0837    amLODIPine (NORVASC) tablet 10 mg  10 mg Oral Daily Presley Barrera MD   10 mg at 12/15/24 0838    atorvastatin (LIPITOR) tablet 40 mg  40 mg Oral Daily Nikki Caceres MD   40 mg at 12/15/24 0838    cholecalciferol (VITAMIN D3) capsule 1,250 mcg  1,250 mcg Oral Q7 Days Evelia Grimm DO   1,250 mcg at 12/10/24 1233    cinacalcet (SENSIPAR) tablet 30 mg  30 mg Oral Daily Presley Barrera MD   30 mg at 12/15/24 0839    cloNIDine (CATAPRES) tablet 0.1 mg  0.1 mg Oral BID Presley Barrera MD   0.1 mg at 12/15/24 2021    furosemide (LASIX) tablet 40 mg  40 mg Oral Daily Presley Barrera MD   40 mg at 12/15/24 0839    gabapentin (NEURONTIN) capsule 100 mg  100 mg Oral Q6H PRN Nikki Caceres MD   100 mg at 11/15/24 0418    hydrocortisone (CORTAID) 0.5 % cream   Topical Daily PRN Savi Chamorro MD        lisinopril (ZESTRIL) tablet 40 mg  40 mg Oral Daily Presley Barrera MD   40 mg at 12/15/24 0838    melatonin tablet 3 mg  3 mg Oral At Bedtime Presley Barrera MD   3 mg at 12/15/24 2020    metoprolol succinate ER (TOPROL XL) 24 hr tablet 50 mg  50 mg Oral Daily Presley Barrera MD   50 mg at 12/14/24 0801    nicotine (NICODERM CQ) 14 MG/24HR 24 hr patch 1 patch  1  patch Transdermal Daily Evelia Grimm DO   1 patch at 12/15/24 0839    nicotine (NICORETTE) gum 2 mg  2 mg Buccal Q1H PRN González Garcia MD   2 mg at 10/24/24 1254    OLANZapine zydis (zyPREXA) ODT tab 5 mg  5 mg Oral At Bedtime González Garcia MD   5 mg at 12/15/24 2020    Or    OLANZapine (zyPREXA) injection 10 mg  10 mg Intramuscular At Bedtime González Garcia MD        OLANZapine (zyPREXA) tablet 5 mg  5 mg Oral TID PRN Evelia Grimm DO   5 mg at 11/24/24 0436    Or    OLANZapine (zyPREXA) injection 5 mg  5 mg Intramuscular TID PRN Evelia Grimm DO        polyethylene glycol (MIRALAX) Packet 17 g  17 g Oral Daily PRN Nikki Caceres MD        traZODone (DESYREL) half-tab 25 mg  25 mg Oral At Bedtime PRN Evelia Grimm DO   25 mg at 12/16/24 0000    Or    traZODone (DESYREL) tablet 50 mg  50 mg Oral At Bedtime PRN Evelia Grimm DO           PRN:  Current Facility-Administered Medications   Medication Dose Route Frequency Provider Last Rate Last Admin    acetaminophen (TYLENOL) tablet 650 mg  650 mg Oral Q4H PRN Nikki Caceres MD   650 mg at 12/16/24 0002    alum & mag hydroxide-simethicone (MAALOX) suspension 30 mL  30 mL Oral Q4H PRN Nikki Caceres MD   30 mL at 10/17/24 0837    amLODIPine (NORVASC) tablet 10 mg  10 mg Oral Daily Presley Barrera MD   10 mg at 12/15/24 0838    atorvastatin (LIPITOR) tablet 40 mg  40 mg Oral Daily Nikki Caceres MD   40 mg at 12/15/24 0838    cholecalciferol (VITAMIN D3) capsule 1,250 mcg  1,250 mcg Oral Q7 Days Evelia Grimm DO   1,250 mcg at 12/10/24 1233    cinacalcet (SENSIPAR) tablet 30 mg  30 mg Oral Daily Presley Barrera MD   30 mg at 12/15/24 0839    cloNIDine (CATAPRES) tablet 0.1 mg  0.1 mg Oral BID Presley Barrera MD   0.1 mg at 12/15/24 2021    furosemide (LASIX) tablet 40 mg  40 mg Oral Daily Presley Barrera MD   40 mg at 12/15/24 0839    gabapentin (NEURONTIN) capsule 100 mg  100 mg Oral Q6H PRN Nikki Caceres MD   100 mg at 11/15/24 3923     hydrocortisone (CORTAID) 0.5 % cream   Topical Daily PRN Savi Chamorro MD        lisinopril (ZESTRIL) tablet 40 mg  40 mg Oral Daily Presley Barrera MD   40 mg at 12/15/24 0838    melatonin tablet 3 mg  3 mg Oral At Bedtime Presley Barrera MD   3 mg at 12/15/24 2020    metoprolol succinate ER (TOPROL XL) 24 hr tablet 50 mg  50 mg Oral Daily Presley Barrera MD   50 mg at 12/14/24 0801    nicotine (NICODERM CQ) 14 MG/24HR 24 hr patch 1 patch  1 patch Transdermal Daily Evelia Grimm DO   1 patch at 12/15/24 0839    nicotine (NICORETTE) gum 2 mg  2 mg Buccal Q1H PRN González Garcia MD   2 mg at 10/24/24 1254    OLANZapine zydis (zyPREXA) ODT tab 5 mg  5 mg Oral At Bedtime González Garcia MD   5 mg at 12/15/24 2020    Or    OLANZapine (zyPREXA) injection 10 mg  10 mg Intramuscular At Bedtime González Garcia MD        OLANZapine (zyPREXA) tablet 5 mg  5 mg Oral TID PRN Evelia Grimm DO   5 mg at 11/24/24 0436    Or    OLANZapine (zyPREXA) injection 5 mg  5 mg Intramuscular TID PRN Evelia Grimm DO        polyethylene glycol (MIRALAX) Packet 17 g  17 g Oral Daily PRN Nikki Caceres MD        traZODone (DESYREL) half-tab 25 mg  25 mg Oral At Bedtime PRN Evelia Grimm DO   25 mg at 12/16/24 0000    Or    traZODone (DESYREL) tablet 50 mg  50 mg Oral At Bedtime PRN Evelia Grimm DO         Labs and Imaging:  Data this admission:  - CBC unremarkable  - CMP unremarkable  - TSH normal  - UDS negative  - Vit D low  - Hgb A1c 5.9 (10/13/24)  - Lipids unremarkable  - Vit B12 normal  - Folate normal  - Urinalysis unremarkable  - EKG normal sinus rhythm, QTc 390 ms  - Head CT showed no acute changes  - HIV non reactive  - Treponema antibody non reactive  - ESR wnl   - Ceruloplasmin wnl   - BOOGIE negative  - Lyme negative      Parathyroid hormone:   10/13: 85     Calcium:  10/14: 11.4  10/16: 10.3  10/17: 10.3  10/19: 9.8  10/21: 10.6  10/23: 10.3     Albumin:  10/14: 4.3  10/16: 4.3  10/17: 4.1  10/19: 4.2  10/21:  "4.5  10/23: 4.3      CXR:   Impression:   1. No definite radiographic evidence of asbestosis. If clinically  indicated, consider evaluation with high resolution chest CT.  2. Nonspecific left costophrenic blunting. Given symmetrically low  lung volumes may be related to poor inspiratory effort/timing.  3. Pulmonary vascular congestion.     MRI:   IMPRESSION:  1. No acute intracranial pathology.  2. No focal lesion or structural abnormality.   3. Symmetric frontoparietal cortical volume loss slightly more than  expected for age.     Mental Status Exam:   Oriented to: Oriented to self and time only  General:  Awake and Alert  Appearance:  appears stated age, Grooming is adequate, and Dressed in his own clothes  Behavior/Attitude:  Engaged, Bizarre, Easy to redirect, and Easily distracted  Eye Contact: Appropriate for conversation  Psychomotor: No evidence of tics, dystonia, or tardive dyskinesia  no catatonia present  Speech:  appropriate volume/tone  Language: Fluent in English with appropriate syntax and vocabulary.  Mood:  \"great\"  Affect:  confused  Thought Process:  tangential and confused  Thought Content:   did not assess SI/HI/ delusion of confusion . Patient denies paranoia  Associations:  loose  Insight:  impaired   Judgment:  impaired   Impulse control: limited  Attention Span:   mildly decreased  Concentration:   distractible  Recent and Remote Memory:   remote memory intact, recent memory impaired  Fund of Knowledge: average  Muscle Strength and Tone: normal  Gait and Station: Normal     Psychiatric Assessment     Estevan Aaron is a 64 year old male with previous psychiatric diagnoses of GEORGINA admitted from the ER on 10/12/2024 due to concern for HI and psychosis in the context of medical issues (hyperthyroidism, hypercalcemia), psychosocial stressors including with recent divorce and selling his home. This is the patient's first psychiatric hospitalization. Significant symptoms on admission included " delusions of persecution with grandiose beliefs, homicidal ideations, as well as disorganized thinking and behavior. The MSE on admission was pertinent for a thought process which was perseverative, circumstantial, tangential, disorganized and tangential; with rambling and looseness of associations. Psychological contributions to presentation included lack of insight. Social factors contributing to presentation included isolation, recent divorce, selling his house, and moving from hotel to hotel. Biological contributions to presentation included a history of hyperparathyroidism with chronic hypercalcemia per charts as well as a history of methamphetamine use per collateral from ex-wife.     History was difficult to obtain due to the patient's severe disorganized thinking on interview; he was observed with persecutory delusions pertaining to the realtor and gang members, disorganized behavior as these delusions have led him to flee to different hotels to stay safe/ has called  complaining of being targeted and auditory hallucinations of voices for the past 12 months. Per collateral from ex-wife, Santos has had paranoid ideations since they first met. He has always felt like people are out to get him or trying to rip him off. She says that he has had visual hallucinations (he will point things out that the rest of the family can't see) for a long time, but the family would just go along with him to avoid making him angry. This timeline and presentation could be consistent with diagnosis of paranoid personality disorder. Things began to worsen around the beginning of the COVID pandemic, when Santos became more isolated and the family started to notice cognitive issues such as impaired memory. Immediately prior to this hospitalization, the ex-wife found Santos in a hotel room with a generator full of gasoline, binoculars, and hunting equipment. This time course does not suggest acute psychosis, however given the patient has  never had a full psychiatric work-up, we completed a first-episode psychosis work-up.      Other things that could be contributing to his presentation is hyperparathyroidism with hypercalcemia. However, patient's calcium returned to normal limits on 10/16, and patient continues to have psychosis so likely not a significant contributing factor to patient's presentation. Still we will assess with new labs tomorrow.     Patient also continues to be disoriented and his behaviors appear to worsen in the evenings, this seems to be due to current Neurocognitive Disorder diagnosed, this could evolved to be his new baseline. Patient currently with no more improvement of bizarre behaviors with current neuroleptic dose, patient probably wont benefit from any modification in current therapy. Still patient did not present with any new episodes of physical agitation and is able to attend groups and interact with peers without major altercations. Today Memory care team interview with Psychiatry team regarding Santos's progress.    Goal of treatment: pending placement to a memory care facility long term. CTC will reach out to second option for placement and discuss next week.        Psychiatric Plan by Diagnosis   Today's changes:  - none    # Major Neurocognitive Disorder  # Rule out paranoid personality disorder  1. Medications:  - Olanzapine 5 mg at bedtime  - Neurology consult 11/8/24, recommend outpatient follow-up and neuropsychiatric testing     2. Pertinent Labs/Monitoring:   - Re-eval Calcium parameters tomorrow     3. Additional Plans:  - Patient will be treated in therapeutic milieu with appropriate individual and group therapies as described  - Patient is Commitment with [facility]  - [facility] meds: Haldol, Clozaril, Risperdal, Invega, Zyprexa, Seroquel, Abilify  - 12/12/24: Psych team had phone call meeting with Memory Care facility personnel     # Unspecified anxiety vs Generalized Anxiety Disorder  - Monitor for symptoms.  -  Fluoxetine held due to suspicion of ongoing manic symptoms     Psychiatric Hospital Course:      Estevan Aaron was admitted to [facility] on a 72 hour hold.   Medications:  PTA fluoxetine was held due to concern for worsening of zelalem   New medications started at the time of admission include Zyprexa.   Increased olanzapine 10 mg at bedtime was to 10 mg BID (10/14)   Increased olanzapine 10 mg BID to 10 mg during day and 15 mg at bedtime (10/15)  10/21: increased olanzapine pm dose from 15 to 20 mg  10/23: started melatonin 3 mg at 7 pm to help patient with circadian rhythm as he has been staying up throughout the night and sleeping a lot during the day and there is some concern for delirium   10/24: consulted anesthesia for MRI brain   10/28: MRI brain complete, decrease AM olanzapine from 10 to 5 mg  10/29: Morning olanzapine decreased to 2.5 mg, evening olanzapine decreased to 15 mg   11/1: Decreased evening olanzapine to 10 mg   11/4: Decreased evening olanzapine to 7.5 mg   11/5: Decreased evening olanzapine to 5 mg   11/11: Moved morning dose of olanzapine to the afternoon as patient appears more agitated in the afternoons/evenings  11/21: Started memantine 5 mg daily   11/26: Discontinued olanzapine 2.5 mg during the afternoon  12/2:   Discontinued memantine 5 mg, daily     Care conference 10/18/24 with daughter Maria C and ex-wife Clifford  - Report that patient has always been paranoid since ex-wife first met him. She describes him always feeling like he is getting ripped off.   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 They reported once incidence in which he made a bid on a job and the client paid him, but he never ended up finishing the job  - Wife and him  officially this year, and since selling the house several months ago, patient has been moving from hotel to hotel due to thinking people are out to get him   - When they were selling their house, patient threatened realtors and felt he was being ripped off   - Clifford reports that she started to be afraid to be around him alone  - When he was found in the hotel, he had a generator full of gasoline, binoculars, bullets, and hunting equipment.     10/21/24: updated daughter on Santos's treatment plan      10/23/24: updated daughter on Santos's treatment plan      10/25/24: updated daughter on Santos's treatment plan, including plan to try and get MRI brain done under sedation. She said that Santos's grandfather had dementia and his sister has a brain tumor.      10/28/24: updated daughter on Santos's MRI results. She is planning on coming into town soon.      Care conference 11/1/24 with daughters Maria C and Estefani and ex-wife Clifford  - Discussed dementia diagnosis   - Clifford asked about plans moving forward. Explained that applying for medical assistance is the first step. Explained that application processing time can be very variable. Informed family that Santos will most likely be staying on this inpatient unit during this time.   - Clifford expressed that their family would like to be the power of /have conservatorship for Santos.   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 Reports that he has bills that he has not paid.   - Maria C is planning to move to [place], and Clifford currently lives in [place]. Family prefer that Santos would in a facility that are near these locations. Discussed with family that they can also try to request a specific location (memory care).   -  Clifford reports that he had previously gotten help applying for his social security and medicare, but unsure if it had been approved.   - Informed family that there is a geriatric unit and there might be a transfer if there becomes availability on this unit.   - Family shared that he was completely different just two months ago.   - Estefani shared that his family found his medications in his old truck recently, expressed that it was likely that he was not taking his medications prior to his hospitalization.      11/6/24: updated Maria C on plan to try and transfer Santos to Geriatric unit.      11/11/24: updated Maria C on neurology consult      The risks, benefits, alternatives, and side effects were discussed and understood by the patient and other caregivers.     Medical Assessment and Plan     Medical diagnoses to be addressed this admission:    # Hypertension  - Continue PTA medications  Furosemide 40 mg daily  Lisinopril 40 mg daily  Metoprolol 50 mg daily  Amlodipine 10 mg daily  Clonidine 0.1 mg BID     # Hyperlipidemia  - Continue PTA Atorvastatin 40 mg     # Primary Hyperparathyroidism  # Hypercalcemia, hypophosphoremia   Increased Ca level to 10.9 and decreased Ph level to 2.3 in the ED. Suspicion patient's hypercalcemia could be contributing to symptoms of psychosis.  - Consulted endocrinology, who started cinacalcet 30 mg BID on 10/13  - 10/16 endocrinology recommended continuing to trend calcium and albumin to make sure patient does not become hypocalcemic and recommended holding cinacalcet   - 10/17, calcium and albumin wnl, endo recommended cinacalcet 30 mg once daily   - 10/21, per endo continue cincaclcet and recheck calcium and albumin on October 24  - 10/23: per arpita, patient needs to follow up outpatient for further management of hyperparathyroidism      Medical course: Patient was physically examined by the ED prior to being transferred to the unit and was found to be medically stable and appropriate  for admission.      Consults: Psychiatry, Endocrinology (follow-up of hyperthyroidism / hypercalcemia and hypertension), neurology     Checklist     Legal Status: Committed   MI Commitment with [facility]  File Number: [id number]  Start and expiration date of commitment: 10/24/24 - 04/24/25     [facility] meds: Haldol, Clozaril, Risperdal, Invega, Zyprexa, Seroquel, Abilify     PPS/CM:  Shelby Chowdary: [phone]  [email or web address].    Safety Assessment:   Behavioral Orders   Procedures    Code 1 - Restrict to Unit    Routine Programming     As clinically indicated    Status 15     Every 15 minutes.    Status Individual Observation     SIO AT NIGHT     Patient SIO status reviewed with team/RN.  Please also refer to RN/team documentation for add'l detail.    -SIO staff to monitor following which have contributed to patient being on SIO:  Disruptive behavior at night     -When following observed, team will review discontinuation of SIO:  Less disruptive and redirectable at night     Order Specific Question:   CONTINUOUS 24 hours / day     Answer:   Other     Order Specific Question:   Specify distance     Answer:   10 ft     Order Specific Question:   Indications for SIO     Answer:   Severe intrusiveness       Risk Assessment:  Risk for harm is low  Risk factors: impulsive and past behaviors  Protective factors: family      Disposition: Pending stabilization, medication optimization, & development of a safe discharge plan.     Attestations     Resident without student: This patient was seen and discussed with my attending physician.    Rocío Orellana MD  N Psychiatry Resident  12/16/2024

## 2024-12-16 NOTE — PLAN OF CARE
Problem: Adult Behavioral Health Plan of Care  Goal: Optimized Coping Skills in Response to Life Stressors  Outcome: Progressing     Problem: Psychotic Signs/Symptoms  Goal: Improved Behavioral Control (Psychotic Signs/Symptoms)  Outcome: Progressing  Intervention: Manage Behavior  Recent Flowsheet Documentation  Taken 12/16/2024 1635 by Tamie Fraser RN  De-Escalation Techniques: verbally redirected     Problem: Anxiety  Goal: Anxiety Reduction or Resolution  Outcome: Progressing     Problem: Manic or Hypomanic Signs/Symptoms  Goal: Improved Impulse Control (Manic/Hypomanic Signs/Symptoms)  Outcome: Progressing  Intervention: Promote Behavior and Impulse Control  Recent Flowsheet Documentation  Taken 12/16/2024 1635 by Tamie Fraser RN  De-Escalation Techniques: verbally redirected  Diversional Activity: television   Goal Outcome Evaluation:    Pt was visible in the lounge watching TV. Isolated and withdrawn to self.  Presented with a flat affect but pleasant on approach. Complained of heel and hip pain. PRN tylenol administered. Pt reported that intervention was effective. Food and fluid intake adequate. Pt was cooperative, compliant with medications and contracted for safety. No behavioral concerns.

## 2024-12-16 NOTE — PROVIDER NOTIFICATION
12/16/24 1247   Individualization/Patient Specific Goals   Patient Personal Strengths resourceful;resilient;positive vocational history;ability to maintain sobriety   Patient Vulnerabilities housing insecurity;lacks insight into illness;poor impulse control   Interprofessional Rounds   Summary Discussed patient progress and new Memory Care referral. Pt continues to await placement.   Participants nursing;CTC;psychiatrist;other (see comments)   Behavioral Team Discussion   Participants Dr. Charly MD; Dr. Larry MD; Gerson Flores RN; Kylee Becerra [facility]; medical students   Progress Pt has improved   Anticipated length of stay 60+ days   Continued Stay Criteria/Rationale Medication management; symptom stabilization; care coordination   Medical/Physical See H&P   Precautions See below   Plan Psychiatric assessment/Medication management. Therapeutic Milieu. Individual care planning and after care planning. Patient to participate in unit groups and activities. Individual and group support on unit.   Safety Plan Completed by unit therapist   Anticipated Discharge Disposition another healthcare facility     PRECAUTIONS AND SAFETY    Behavioral Orders   Procedures    Code 1 - Restrict to Unit    Routine Programming     As clinically indicated    Status 15     Every 15 minutes.    Status Individual Observation     SIO AT NIGHT     Patient SIO status reviewed with team/RN.  Please also refer to RN/team documentation for add'l detail.    -SIO staff to monitor following which have contributed to patient being on SIO:  Disruptive behavior at night     -When following observed, team will review discontinuation of SIO:  Less disruptive and redirectable at night     Order Specific Question:   CONTINUOUS 24 hours / day     Answer:   Other     Order Specific Question:   Specify distance     Answer:   10 ft     Order Specific Question:   Indications for SIO     Answer:   Severe intrusiveness       Safety  Safety WDL:  WDL  Patient Location: lounge  Observed Behavior: calm  Observed Behavior (Comment): sitting on the lounge watching a game  Airway Safety Measures: other (see comments)  Safety Measures: environmental rounds completed, safety rounds completed  Diversional Activity: television  De-Escalation Techniques: verbally redirected  Suicidality: Status 15, Minimal furniture in room, Minimal personal belongings in room, Promote patient engagement with treatment process, Identify and strengthen protective factors, Optimize communication / relationship to minimize opportunity for self-harm  Assault: status continuous sight  Elopement Assessment: Loitering near exit doors, Statements about wanting to leave  Elopement Interventions: status 15

## 2024-12-16 NOTE — PLAN OF CARE
Goal Outcome Evaluation:    Plan of Care Reviewed With: patient     Patient's affect was flat and blunted. His mood was calm. He denied pain. He denied all MH psych symptoms and contracted for safety. His hygiene is unkempt. He is eating and drinking adequately. He spent most of the time in the lounge; watching TV and sleeping on and off. He was medication compliant. He socialized with select peers and with staff. There was no safety or behavioral concerns this shift. Will continue current care plan and notify the MD of any concerns.

## 2024-12-16 NOTE — PLAN OF CARE
Team Note Due:  Monday    Assessment/Intervention/Current Symtoms and Care Coordination:  Chart review and met with team, discussed pt progress, symptomology, and response to treatment.  Discussed the discharge plan and any potential impediments to discharge. Clifford Aaron is currently emergency guardian/conservator until 01/20/2025.    Clifford requested records be sent to Nikki at [facility] in [place] as she completed an application for pt. RN notes and MAR sent via fax. Writer followed up with Nikki to notify her of incoming fax.    Discharge Plan or Goal:  Memory care facility     Barriers to Discharge:  Patient requires further psychiatric stabilization due to current symptomology, medication management with changes subject to provider, coordination with outside supports, and aftercare planning. Pt is under civil commitment.     Referral Status:    Memory Care facilities currently in process:  [facility]. Tour completed 11/27.  [facility]. [facility]. Records sent 12/2/24.  Lorraine (): [email or web address]; [phone]  Isatu (director of nursing): [email or web address]  [facility]. Tour completed 11/29.  [facility]. Tour completed 11/30.  [facility]. Records sent 12/16/14.  Nikki Noel: [email or web address]; Office [phone], Fax [phone]    Memory Care facilities pending family review:  [facility].   [facility].  [facility].  [facility].  [facility].  [facility].    Memory Care facilities declined:  [facility] (private pay only, no EW).  [facility] [address] (private pay only, no EW).  [facility] (no openings).     Legal Status:  MI Commitment with [facility]:  [place]  File Number: [id number]  Start and expiration date of commitment: 10/24/24 - 04/24/25    [place] meds: Haldol, Clozaril, Risperdal, Invega, Zyprexa, Seroquel, Abilify    PPS/CM:  Shelby Chowdary: [phone]  [email or web address]    Contacts:  Maria C Aaron (Daughter): [phone]   Clifford Agnieszka (ex-wife): [phone]     No Del Angel (guardianship/conservatorship ): [phone]  [email or web address]     Upcoming Meetings and Dates/Important Information and next steps:  Follow up with family regarding list of Memory Care facilities  Discharge planning when appropriate  Pt does not qualify for MA per financial counselor on 11/8/2024. Pt will likely privately pay for Memory Care until he qualifies for MA/waivers in the future.  MNChoice Assessment scheduled on 12/31 at 10:00am with Amanda Beard.  Call scheduling team if pt is discharged or if this needs to be rescheduled: [phone]     Provisional Discharge and Change of Status needed at discharge

## 2024-12-16 NOTE — PLAN OF CARE
Problem: Adult Behavioral Health Plan of Care  Goal: Plan of Care Review  Outcome: Progressing  Flowsheets  Taken 12/15/2024 1848  Plan of Care Reviewed With: patient  Overall Patient Progress: improving  Patient Agreement with Plan of Care: agrees  Taken 12/15/2024 1627  Patient Agreement with Plan of Care: agrees     Problem: Adult Behavioral Health Plan of Care  Goal: Absence of New-Onset Illness or Injury  Outcome: Progressing  Intervention: Identify and Manage Fall Risk  Recent Flowsheet Documentation  Taken 12/15/2024 1627 by Torito Cruz, RN  Safety Measures:   environmental rounds completed   safety rounds completed     Problem: Pain Acute  Goal: Optimal Pain Control and Function  Intervention: Develop Pain Management Plan  Recent Flowsheet Documentation  Taken 12/15/2024 1541 by Torito Cruz, RN  Pain Management Interventions: medication (see MAR)   Goal Outcome Evaluation:    Plan of Care Reviewed With: patient Plan of Care Reviewed With: patient    Overall Patient Progress: improvingOverall Patient Progress: improving      Alert, intermittently confused and forgetful. Able to communicate needs. Visible in milieu all shift. He was social and interactive with peers and staff members. Pleasant, cooperative, and medication compliant. Tylenol PRN given for mild right hip pain, medication effective. Patient denied any anxiety or depression, denied any suicidal thoughts. ADLs WNL. Food and fluid intake WNL.

## 2024-12-16 NOTE — PLAN OF CARE
BEH IP Unit Acuity Rating Score (UARS)  Patient is given one point for every criteria they meet.    CRITERIA SCORING   On a 72 hour hold, court hold, committed, stay of commitment, or revocation. 1    Patient LOS on BEH unit exceeds 20 days. 1  LOS: 65   Patient under guardianship, 55+, otherwise medically complex, or under age 11. 1   Suicide ideation without relief of precipitating factors. 0   Current plan for suicide. 0   Current plan for homicide. 0   Imminent risk or actual attempt to seriously harm another without relief of factors precipitating the attempt. 1   Severe dysfunction in daily living (ex: complete neglect for self care, extreme disruption in vegetative function, extreme deterioration in social interactions). 1   Recent (last 7 days) or current physical aggression in the ED or on unit. 0   Restraints or seclusion episode in past 72 hours. 0   Recent (last 7 days) or current verbal aggression, agitation, yelling, etc., while in the ED or unit. 0   Active psychosis. 1   Need for constant or near constant redirection (from leaving, from others, etc).  0   Intrusive or disruptive behaviors. 1   Patient requires 3 or more hours of individualized nursing care per 8-hour shift (i.e. for ADLs, meds, therapeutic interventions). 1   TOTAL 8

## 2024-12-16 NOTE — PLAN OF CARE
Problem: Sleep Disturbance  Goal: Adequate Sleep/Rest  Outcome: Progressing    Pt appears to have slept for 3.25 hours. Pt was intermittently intrusive, but was easily redirected by the SIO staff. PRN Tylenol was given for generalized body pain and Trazodone was given to promote sleep. PRN Tylenol  was effective, as there was no further complain of pain, but PRN Trazodone was partially effective. Pt denies anxiety, depression, and SI/SIB.  Pt is on 1:1 SIO for severe intrusiveness during night shift. 15 minutes safety checks were in place. Staff will continue to offer support to pt.

## 2024-12-16 NOTE — PLAN OF CARE
"Individual Therapy Note    Session duration in minutes: 9    Pt progress: Met with Pt in [facility]. Pt has previously declined 1:1 sessions but was open to light conversation, was engaged though provided mainly one word answers. Pt reports he has been \"bored\" in the hospital though overall doing well. Pt was somewhat irritable talking about his care team, reports he does not have a doctor anymore. Writer assured him he will meet with a doctor today if he hasn't already but Pt did not express understanding. Pt became less focused throughout session and became minimally engaged, ended session. Reminded Pt 1:1 therapy is available to him again.    Therapeutic Intervention(s):   Provided active listening, unconditional positive regard, and validation.   Engaged in guided discovery, explored patient's perspectives and helped expand them through socratic dialogue      No Charge (0-15 min)        "

## 2024-12-17 PROCEDURE — 250N000013 HC RX MED GY IP 250 OP 250 PS 637

## 2024-12-17 PROCEDURE — 124N000002 HC R&B MH UMMC

## 2024-12-17 PROCEDURE — 99231 SBSQ HOSP IP/OBS SF/LOW 25: CPT | Mod: GC | Performed by: PSYCHIATRY & NEUROLOGY

## 2024-12-17 RX ADMIN — CINACALCET 30 MG: 30 TABLET ORAL at 08:49

## 2024-12-17 RX ADMIN — Medication 1250 MCG: at 12:34

## 2024-12-17 RX ADMIN — Medication 25 MG: at 03:32

## 2024-12-17 RX ADMIN — FUROSEMIDE 40 MG: 40 TABLET ORAL at 08:49

## 2024-12-17 RX ADMIN — CLONIDINE HYDROCHLORIDE 0.1 MG: 0.1 TABLET ORAL at 21:25

## 2024-12-17 RX ADMIN — Medication 1 PATCH: at 08:55

## 2024-12-17 RX ADMIN — ATORVASTATIN CALCIUM 40 MG: 20 TABLET, FILM COATED ORAL at 08:49

## 2024-12-17 RX ADMIN — Medication 25 MG: at 21:24

## 2024-12-17 RX ADMIN — LISINOPRIL 40 MG: 10 TABLET ORAL at 08:49

## 2024-12-17 RX ADMIN — ACETAMINOPHEN 650 MG: 325 TABLET, FILM COATED ORAL at 03:32

## 2024-12-17 RX ADMIN — AMLODIPINE BESYLATE 10 MG: 5 TABLET ORAL at 08:49

## 2024-12-17 RX ADMIN — CLONIDINE HYDROCHLORIDE 0.1 MG: 0.1 TABLET ORAL at 08:49

## 2024-12-17 RX ADMIN — METOPROLOL SUCCINATE 50 MG: 50 TABLET, EXTENDED RELEASE ORAL at 08:49

## 2024-12-17 RX ADMIN — OLANZAPINE 5 MG: 5 TABLET, ORALLY DISINTEGRATING ORAL at 21:25

## 2024-12-17 RX ADMIN — MELATONIN TAB 3 MG 3 MG: 3 TAB at 21:25

## 2024-12-17 ASSESSMENT — ACTIVITIES OF DAILY LIVING (ADL)
ADLS_ACUITY_SCORE: 54
DRESS: INDEPENDENT;STREET CLOTHES
DRESS: INDEPENDENT
ADLS_ACUITY_SCORE: 54
ORAL_HYGIENE: INDEPENDENT
ADLS_ACUITY_SCORE: 54
ADLS_ACUITY_SCORE: 54
HYGIENE/GROOMING: INDEPENDENT
ADLS_ACUITY_SCORE: 54
HYGIENE/GROOMING: INDEPENDENT
ADLS_ACUITY_SCORE: 54
ORAL_HYGIENE: INDEPENDENT
ADLS_ACUITY_SCORE: 54
LAUNDRY: UNABLE TO COMPLETE
ADLS_ACUITY_SCORE: 54
LAUNDRY: UNABLE TO COMPLETE
ADLS_ACUITY_SCORE: 54

## 2024-12-17 NOTE — PLAN OF CARE
Team Note Due:  Monday    Assessment/Intervention/Current Symtoms and Care Coordination:  Chart review and met with team, discussed pt progress, symptomology, and response to treatment.  Discussed the discharge plan and any potential impediments to discharge. Clifford Aaron is currently emergency guardian/conservator until 01/20/2025.    Shelby plans to visit pt this afternoon for check-in. Updated RN.    Discharge Plan or Goal:  Memory care facility     Barriers to Discharge:  Patient requires further psychiatric stabilization due to current symptomology, medication management with changes subject to provider, coordination with outside supports, and aftercare planning. Pt is under civil commitment.     Referral Status:    Memory Care facilities currently in process:  [facility]. Tour completed 11/27.  [facility]. Tour completed 11/[facility]. Records sent 12/2/24.  Lorraine (): [email or web address]; [phone]  Isatu (director of nursing): [email or web address]  [facility]. Tour completed 11/29.  [facility]. Tour completed 11/30.  [facility]. Records sent 12/16/14.  Nikki Noel: [email or web address]; Office [phone], Fax [phone]    Memory Care facilities pending family review:  [facility].   [facility].  [facility].  [facility].  [facility].  [facility].    Memory Care facilities declined:  [facility] (private pay only, no EW).  [facility] [address] (private pay only, no EW).  [facility] (no openings).     Legal Status:  MI Commitment with [facility]  File Number: [id number]  Start and expiration date of commitment: 10/24/24 - 04/24/25    [facility] meds: Haldol, Clozaril, Risperdal, Invega,  Zyprexa, Seroquel, Abilify    PPS/CM:  Shelby Chowdary: [phone]  [email or web address].    Contacts:  Maria C Aaron (Daughter): [phone]   Clifford Aaron (ex-wife): [phone]     No Del Angel (guardianship/conservatorship ): [phone]  [email or web address]     Upcoming Meetings and Dates/Important Information and next steps:  Follow up with family regarding list of Memory Care facilities  Discharge planning when appropriate  Pt does not qualify for MA per financial counselor on 11/8/2024. Pt will likely privately pay for Memory Care until he qualifies for MA/waivers in the future.  MNChoice Assessment scheduled on 12/31 at 10:00am with Amanda Beard.  Call scheduling team if pt is discharged or if this needs to be rescheduled: [phone]     Provisional Discharge and Change of Status needed at discharge

## 2024-12-17 NOTE — PLAN OF CARE
"Pt took am medications. Though pt tried few times to pour of cup of am medications into his cup of water. This staff eventually put his am meds on a spoon and pt took his meds.  Pt sleeping  a lot in the lounge.  Pt encouraged to attend groups.  Pt got irritable when suggested.  Pt observed what appeared to try to screw a piece of paper into the wall. Pt not hold a screw  and there were no screws in the wall.  At lunch upset because the tomatoes were not re enough.  Pt no bx issues this shift.    Problem: Adult Behavioral Health Plan of Care  Goal: Plan of Care Review  Outcome: Not Progressing  Goal: Patient-Specific Goal (Individualization)  Description: You can add care plan individualizations to a care plan. Examples of Individualization might be:  \"Parent requests to be called daily at 9am for status\", \"I have a hard time hearing out of my right ear\", or \"Do not touch me to wake me up as it startles  me\".  Outcome: Not Progressing  Goal: Adheres to Safety Considerations for Self and Others  Outcome: Not Progressing  Intervention: Develop and Maintain Individualized Safety Plan  Recent Flowsheet Documentation  Taken 12/17/2024 1300 by Catherine Szymanski RN  Safety Measures:   environmental rounds completed   safety rounds completed  Intervention: Develop and Maintain Individualized Safety Plan  Recent Flowsheet Documentation  Taken 12/17/2024 1300 by Catherine Szymanski, RN  Safety Measures:   environmental rounds completed   safety rounds completed  Goal: Absence of New-Onset Illness or Injury  Outcome: Not Progressing  Intervention: Identify and Manage Fall Risk  Recent Flowsheet Documentation  Taken 12/17/2024 1300 by Catherine Szymanski RN  Safety Measures:   environmental rounds completed   safety rounds completed  Goal: Optimized Coping Skills in Response to Life Stressors  Outcome: Not Progressing  Goal: Develops/Participates in Therapeutic [place] to Support Successful Transition  Outcome: Not Progressing   "   Problem: Psychotic Signs/Symptoms  Goal: Improved Behavioral Control (Psychotic Signs/Symptoms)  Outcome: Not Progressing  Goal: Optimal Cognitive Function (Psychotic Signs/Symptoms)  Outcome: Not Progressing  Goal: Increased Participation and Engagement (Psychotic Signs/Symptoms)  Outcome: Not Progressing  Goal: Improved Mood Symptoms (Psychotic Signs/Symptoms)  Outcome: Not Progressing  Goal: Improved Psychomotor Symptoms (Psychotic Signs/Symptoms)  Outcome: Not Progressing  Intervention: Manage Psychomotor Movement  Recent Flowsheet Documentation  Taken 12/17/2024 1300 by Catherine Szymanski RN  Activity (Behavioral Health):   activity encouraged   up ad diya  Goal: Decreased Sensory Symptoms (Psychotic Signs/Symptoms)  Outcome: Not Progressing  Goal: Improved Sleep (Psychotic Signs/Symptoms)  Outcome: Not Progressing  Goal: Enhanced Social, Occupational or Functional Skills (Psychotic Signs/Symptoms)  Outcome: Not Progressing     Problem: Depression  Goal: Improved Mood  Outcome: Not Progressing     Problem: Suicidal Behavior  Goal: Suicidal Behavior is Absent or Managed  Outcome: Not Progressing     Problem: Anxiety  Goal: Anxiety Reduction or Resolution  Outcome: Not Progressing     Problem: Sleep Disturbance  Goal: Adequate Sleep/Rest  Outcome: Not Progressing     Problem: Seclusion/Restraint, Behavioral  Goal: Seclusion/Behavioral Restraint Goal: Absence of Harm or Injury  Outcome: Not Progressing  Intervention: Implement Least Restrictive Safety Strategies  Recent Flowsheet Documentation  Taken 12/17/2024 1300 by Catherine Szymanski, RN  Safety Measures:   environmental rounds completed   safety rounds completed     Problem: Pain Acute  Goal: Optimal Pain Control and Function  Outcome: Not Progressing     Problem: Manic or Hypomanic Signs/Symptoms  Goal: Improved Impulse Control (Manic/Hypomanic Signs/Symptoms)  Outcome: Not Progressing  Goal: Optimized Cognitive Function (Manic/Hypomanic Signs/Symptoms)  Outcome:  Not Progressing  Goal: Improved Mood Symptoms (Manic/Hypomanic Signs/Symptoms)  Outcome: Not Progressing  Goal: Optimized Nutrition Intake (Manic or Hypomanic Signs/Symptoms)  Outcome: Not Progressing  Goal: Improved Psychomotor Symptoms (Manic/Hypomanic Signs/Symptoms)  Outcome: Not Progressing  Intervention: Manage Psychomotor Movement  Recent Flowsheet Documentation  Taken 12/17/2024 1300 by Catherine Szymanski RN  Activity (Behavioral Health):   activity encouraged   up ad diya  Goal: Improved Sleep (Manic/Hypomanic Signs/Symptoms)  Outcome: Not Progressing  Goal: Enhanced Social, Occupational or Functional Skills (Manic/Hypomanic Signs/Symptoms)  Outcome: Not Progressing   Goal Outcome Evaluation:

## 2024-12-17 NOTE — PLAN OF CARE
Problem: Adult Behavioral Health Plan of Care  Goal: Plan of Care Review  Outcome: Progressing  Flowsheets (Taken 12/17/2024 1600)  Patient Agreement with Plan of Care: agrees     Problem: Psychotic Signs/Symptoms  Goal: Improved Behavioral Control (Psychotic Signs/Symptoms)  Outcome: Progressing     Problem: Anxiety  Goal: Anxiety Reduction or Resolution  Outcome: Progressing   Goal Outcome Evaluation:    Plan of Care Reviewed With: patient       Pt was visible in the milieu, speech remains tangential with word of ideas, he asked a peer to turn the TV volume down while the TV was off. Pt started mumbling to be given the remote to take control but he was able to be redirectable. Pt has been sitting on lounge calmly watching sports and dozing on and off. Dismissive with mental health assessment questions, denied any physical pain.     Pt was compliant with medication with no issues, hygiene remains unkempt, adequate food/fluid intake, no major behavior noted.

## 2024-12-17 NOTE — PROGRESS NOTES
----------------------------------------------------------------------------------------------------------  [facility]  Psychiatry Progress Note  Hospital Day #66     Interim History:     The patient's care was discussed with the treatment team and chart notes were reviewed.    Identifier: Estevan Aaron is a 64 year old male with previous psychiatric diagnoses of  generalized anxiety disorder, Neurocognitive disorder, admitted from the ED 10/12/2024 due to concern for HI and psychosis in the context of medical issues (hyperthyroidism, hypercalcemia) psychosocial stressors including family dynamics with recent possible divorce.    Vitals: VSS  Sleep: 3.25 hours (12/17/24 0622)  Scheduled medications: Took all scheduled medications as prescribed  Psychiatric PRN medications:   Last 24H PRN:     acetaminophen (TYLENOL) tablet 650 mg, 650 mg at 12/17/24 0332    traZODone (DESYREL) half-tab 25 mg, 25 mg at 12/17/24 0332 **OR** [DISCONTINUED] traZODone (DESYREL) tablet 50 mg      Staff Report:   He appears to be disorganized, and not easily redirected by the SIO staff. PRN Tylenol was given for generalized body pain and Trazodone was given to promote sleep. Pt denies anxiety, depression, and SI/SIB. Pt is on 1:1 SIO for severe intrusiveness during night shift. 15 minutes safety checks were in place. Throughout the day, he spent most of the time in the lounge; watching TV and sleeping on and off. He was medication compliant. He socialized with select peers and with staff. Food and fluid intake adequate.     See staff notes for more information     Subjective:     Patient Interview:  Santos was woken up during a nap for the interview. Santos mentions that he is doing great today. He was able to sleep yesterday and today he ate scramble eggs with potatoes and cucumber. He mentions it was good. Santos says he will go to groups to draw some more. Patient states he is drawing. Denied any  other concerns,  team let him know to reach out if something is needed.    ROS:  See above     Objective:     Vitals:  /74   Pulse 70   Temp 97.6  F (36.4  C) (Oral)   Resp 18   Wt 111.4 kg (245 lb 11.2 oz)   SpO2 99%   BMI 39.66 kg/m      Allergies:  No Known Allergies    Current Medications:  Scheduled:  Current Facility-Administered Medications   Medication Dose Route Frequency Provider Last Rate Last Admin    acetaminophen (TYLENOL) tablet 650 mg  650 mg Oral Q4H PRN Nikki Caceres MD   650 mg at 12/17/24 0332    alum & mag hydroxide-simethicone (MAALOX) suspension 30 mL  30 mL Oral Q4H PRN Nikki Caceres MD   30 mL at 10/17/24 0837    amLODIPine (NORVASC) tablet 10 mg  10 mg Oral Daily Presley Barrera MD   10 mg at 12/16/24 0836    atorvastatin (LIPITOR) tablet 40 mg  40 mg Oral Daily Nikki Caceres MD   40 mg at 12/16/24 0836    cholecalciferol (VITAMIN D3) capsule 1,250 mcg  1,250 mcg Oral Q7 Days Sirisha EveliaDO   1,250 mcg at 12/10/24 1233    cinacalcet (SENSIPAR) tablet 30 mg  30 mg Oral Daily Presley Barrera MD   30 mg at 12/16/24 0836    cloNIDine (CATAPRES) tablet 0.1 mg  0.1 mg Oral BID Presley Barrera MD   0.1 mg at 12/16/24 2134    furosemide (LASIX) tablet 40 mg  40 mg Oral Daily Presley Barrera MD   40 mg at 12/16/24 0837    gabapentin (NEURONTIN) capsule 100 mg  100 mg Oral Q6H PRN Nikki Caceres MD   100 mg at 11/15/24 0418    hydrocortisone (CORTAID) 0.5 % cream   Topical Daily PRN Savi Chamorro MD        lisinopril (ZESTRIL) tablet 40 mg  40 mg Oral Daily Presley Barrera MD   40 mg at 12/16/24 0836    melatonin tablet 3 mg  3 mg Oral At Bedtime Presley Barrera MD   3 mg at 12/16/24 2134    metoprolol succinate ER (TOPROL XL) 24 hr tablet 50 mg  50 mg Oral Daily Presley Barrera MD   50 mg at 12/16/24 0837    nicotine (NICODERM CQ) 14 MG/24HR 24 hr patch 1 patch  1 patch Transdermal Daily Evelia Grimm DO   1 patch at 12/16/24 0844    nicotine (NICORETTE) gum  2 mg  2 mg Buccal Q1H PRN González Garcia MD   2 mg at 10/24/24 1254    OLANZapine zydis (zyPREXA) ODT tab 5 mg  5 mg Oral At Bedtime González Garcia MD   5 mg at 12/16/24 2135    Or    OLANZapine (zyPREXA) injection 10 mg  10 mg Intramuscular At Bedtime González Garcia MD        OLANZapine (zyPREXA) tablet 5 mg  5 mg Oral TID PRN Evelia Grimm DO   5 mg at 11/24/24 0436    Or    OLANZapine (zyPREXA) injection 5 mg  5 mg Intramuscular TID PRN Evelia Grimm DO        polyethylene glycol (MIRALAX) Packet 17 g  17 g Oral Daily PRN Nikki Caceres MD        traZODone (DESYREL) half-tab 25 mg  25 mg Oral At Bedtime Rocío Lopez MD   25 mg at 12/16/24 2135    traZODone (DESYREL) half-tab 25 mg  25 mg Oral At Bedtime PRN Evelia Grimm DO   25 mg at 12/17/24 0332       PRN:  Current Facility-Administered Medications   Medication Dose Route Frequency Provider Last Rate Last Admin    acetaminophen (TYLENOL) tablet 650 mg  650 mg Oral Q4H PRN Nikki Caceres MD   650 mg at 12/17/24 0332    alum & mag hydroxide-simethicone (MAALOX) suspension 30 mL  30 mL Oral Q4H PRN Nikki Caceres MD   30 mL at 10/17/24 0837    amLODIPine (NORVASC) tablet 10 mg  10 mg Oral Daily Presley Barrera MD   10 mg at 12/16/24 0836    atorvastatin (LIPITOR) tablet 40 mg  40 mg Oral Daily Nikki Caceres MD   40 mg at 12/16/24 0836    cholecalciferol (VITAMIN D3) capsule 1,250 mcg  1,250 mcg Oral Q7 Days Evelia Grimm DO   1,250 mcg at 12/10/24 1233    cinacalcet (SENSIPAR) tablet 30 mg  30 mg Oral Daily Presley Barrera MD   30 mg at 12/16/24 0836    cloNIDine (CATAPRES) tablet 0.1 mg  0.1 mg Oral BID Presley Barrera MD   0.1 mg at 12/16/24 2134    furosemide (LASIX) tablet 40 mg  40 mg Oral Daily Presley Barrera MD   40 mg at 12/16/24 0837    gabapentin (NEURONTIN) capsule 100 mg  100 mg Oral Q6H PRN Nikki Caceres MD   100 mg at 11/15/24 0418    hydrocortisone (CORTAID) 0.5 % cream   Topical Daily PRN  Savi Chamorro MD        lisinopril (ZESTRIL) tablet 40 mg  40 mg Oral Daily Presley Barrera MD   40 mg at 12/16/24 0836    melatonin tablet 3 mg  3 mg Oral At Bedtime Presley Barrera MD   3 mg at 12/16/24 2134    metoprolol succinate ER (TOPROL XL) 24 hr tablet 50 mg  50 mg Oral Daily Presley Barrera MD   50 mg at 12/16/24 0837    nicotine (NICODERM CQ) 14 MG/24HR 24 hr patch 1 patch  1 patch Transdermal Daily Evelia Grimm DO   1 patch at 12/16/24 0844    nicotine (NICORETTE) gum 2 mg  2 mg Buccal Q1H PRN González Garcia MD   2 mg at 10/24/24 1254    OLANZapine zydis (zyPREXA) ODT tab 5 mg  5 mg Oral At Bedtime González Garcia MD   5 mg at 12/16/24 2135    Or    OLANZapine (zyPREXA) injection 10 mg  10 mg Intramuscular At Bedtime González Garcia MD        OLANZapine (zyPREXA) tablet 5 mg  5 mg Oral TID PRN Evelia Grimm DO   5 mg at 11/24/24 0436    Or    OLANZapine (zyPREXA) injection 5 mg  5 mg Intramuscular TID PRN Evelia Grimm DO        polyethylene glycol (MIRALAX) Packet 17 g  17 g Oral Daily PRN Nikki Caceres MD        traZODone (DESYREL) half-tab 25 mg  25 mg Oral At Bedtime Rocío Lopez MD   25 mg at 12/16/24 2135    traZODone (DESYREL) half-tab 25 mg  25 mg Oral At Bedtime PRN Evelia Grimm DO   25 mg at 12/17/24 0332     Labs and Imaging:  Data this admission:  - CBC unremarkable  - CMP unremarkable  - TSH normal  - UDS negative  - Vit D low  - Hgb A1c 5.9 (10/13/24)  - Lipids unremarkable  - Vit B12 normal  - Folate normal  - Urinalysis unremarkable  - EKG normal sinus rhythm, QTc 390 ms  - Head CT showed no acute changes  - HIV non reactive  - Treponema antibody non reactive  - ESR wnl   - Ceruloplasmin wnl   - BOOGIE negative  - Lyme negative      Parathyroid hormone:   10/13: 85     Calcium:  10/14: 11.4  10/16: 10.3  10/17: 10.3  10/19: 9.8  10/21: 10.6  10/23: 10.3     Albumin:  10/14: 4.3  10/16: 4.3  10/17: 4.1  10/19: 4.2  10/21: 4.5  10/23: 4.3      CXR:  "  Impression:   1. No definite radiographic evidence of asbestosis. If clinically  indicated, consider evaluation with high resolution chest CT.  2. Nonspecific left costophrenic blunting. Given symmetrically low  lung volumes may be related to poor inspiratory effort/timing.  3. Pulmonary vascular congestion.     MRI:   IMPRESSION:  1. No acute intracranial pathology.  2. No focal lesion or structural abnormality.   3. Symmetric frontoparietal cortical volume loss slightly more than  expected for age.     Mental Status Exam:   Oriented to: Oriented to self and time only  General:  Awake and Alert  Appearance:  appears stated age, Grooming is adequate, and Dressed in his own clothes  Behavior/Attitude:  Disengaged, Easy to redirect, Easily distracted, and sleepy  Eye Contact: Appropriate for conversation  Psychomotor: No evidence of tics, dystonia, or tardive dyskinesia  no catatonia present  Speech:  appropriate volume/tone and slurred due to being sleepy  Language: Fluent in English with appropriate syntax and vocabulary.  Mood:  \"good\"  Affect:  confused  Thought Process:  tangential and confused  Thought Content:   did not assess SI/HI/ delusion of confusion . Patient denies paranoia  Associations:  loose  Insight:  impaired   Judgment:  impaired   Impulse control: limited  Attention Span:   mildly decreased  Concentration:   distractible  Recent and Remote Memory:   remote memory intact, recent memory impaired  Fund of Knowledge: average  Muscle Strength and Tone: normal  Gait and Station: Normal     Psychiatric Assessment     Estevan Aaron is a 64 year old male with previous psychiatric diagnoses of GEORGINA admitted from the ER on 10/12/2024 due to concern for HI and psychosis in the context of medical issues (hyperthyroidism, hypercalcemia), psychosocial stressors including with recent divorce and selling his home. This is the patient's first psychiatric hospitalization. Significant symptoms on admission included " delusions of persecution with grandiose beliefs, homicidal ideations, as well as disorganized thinking and behavior. The MSE on admission was pertinent for a thought process which was perseverative, circumstantial, tangential, disorganized and tangential; with rambling and looseness of associations. Psychological contributions to presentation included lack of insight. Social factors contributing to presentation included isolation, recent divorce, selling his house, and moving from hotel to hotel. Biological contributions to presentation included a history of hyperparathyroidism with chronic hypercalcemia per charts as well as a history of methamphetamine use per collateral from ex-wife.     History was difficult to obtain due to the patient's severe disorganized thinking on interview; he was observed with persecutory delusions pertaining to the realtor and gang members, disorganized behavior as these delusions have led him to flee to different hotels to stay safe/ has called  complaining of being targeted and auditory hallucinations of voices for the past 12 months. Per collateral from ex-wife, Santos has had paranoid ideations since they first met. He has always felt like people are out to get him or trying to rip him off. She says that he has had visual hallucinations (he will point things out that the rest of the family can't see) for a long time, but the family would just go along with him to avoid making him angry. This timeline and presentation could be consistent with diagnosis of paranoid personality disorder. Things began to worsen around the beginning of the COVID pandemic, when Santos became more isolated and the family started to notice cognitive issues such as impaired memory. Immediately prior to this hospitalization, the ex-wife found Santos in a hotel room with a generator full of gasoline, binoculars, and hunting equipment. This time course does not suggest acute psychosis, however given the patient has  never had a full psychiatric work-up, we completed a first-episode psychosis work-up.      Other things that could be contributing to his presentation is hyperparathyroidism with hypercalcemia. However, patient's calcium returned to normal limits on 10/16, and patient continues to have psychosis so likely not a significant contributing factor to patient's presentation. Still we will assess with new labs tomorrow.     Patient also continues to be disoriented and his behaviors appear to worsen in the evenings, this seems to be due to current Neurocognitive Disorder diagnosed, this could evolved to be his new baseline. Patient currently with no more improvement of bizarre behaviors with current neuroleptic dose, patient probably wont benefit from any modification in current therapy. Still patient did not present with any new episodes of physical agitation and is able to attend groups and interact with peers without major altercations. Today Memory care team interview with Psychiatry team regarding Santos's progress.    Goal of treatment: pending placement to a memory care facility long term. CTC will reach out to second option for placement and discuss next week.        Psychiatric Plan by Diagnosis   Today's changes:  - none    # Major Neurocognitive Disorder  # Rule out paranoid personality disorder  1. Medications:  - Olanzapine 5 mg at bedtime  - Neurology consult 11/8/24, recommend outpatient follow-up and neuropsychiatric testing     2. Pertinent Labs/Monitoring:   - Re-eval Calcium parameters tomorrow     3. Additional Plans:  - Patient will be treated in therapeutic milieu with appropriate individual and group therapies as described  - Patient is Commitment with [facility]  - [facility] meds: Haldol, Clozaril, Risperdal, Invega, Zyprexa, Seroquel, Abilify  - 12/12/24: Psych team had phone call meeting with Memory Care facility personnel  - Pending responses primary from [facility] assisted and [facility]  Care      # Unspecified anxiety vs Generalized Anxiety Disorder  - Monitor for symptoms.  - Fluoxetine held due to suspicion of ongoing manic symptoms     Psychiatric Hospital Course:      Estevan Aaron was admitted to [facility] on a 72 hour hold.   Medications:  PTA fluoxetine was held due to concern for worsening of zelalem   New medications started at the time of admission include Zyprexa.   Increased olanzapine 10 mg at bedtime was to 10 mg BID (10/14)   Increased olanzapine 10 mg BID to 10 mg during day and 15 mg at bedtime (10/15)  10/21: increased olanzapine pm dose from 15 to 20 mg  10/23: started melatonin 3 mg at 7 pm to help patient with circadian rhythm as he has been staying up throughout the night and sleeping a lot during the day and there is some concern for delirium   10/24: consulted anesthesia for MRI brain   10/28: MRI brain complete, decrease AM olanzapine from 10 to 5 mg  10/29: Morning olanzapine decreased to 2.5 mg, evening olanzapine decreased to 15 mg   11/1: Decreased evening olanzapine to 10 mg   11/4: Decreased evening olanzapine to 7.5 mg   11/5: Decreased evening olanzapine to 5 mg   11/11: Moved morning dose of olanzapine to the afternoon as patient appears more agitated in the afternoons/evenings  11/21: Started memantine 5 mg daily   11/26: Discontinued olanzapine 2.5 mg during the afternoon  12/2:   Discontinued memantine 5 mg, daily     Care conference 10/18/24 with daughter Maria C and ex-wife Clifford  - Report that patient has always been paranoid since ex-wife first met him. She describes him always feeling like he is getting ripped off.   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 They reported once incidence in which he made a bid on a job and the client paid him, but he never ended up finishing the job  - Wife and him  officially this year, and since selling the house several months ago, patient has been moving from hotel to hotel due to thinking people are out to get him   - When they were selling their house, patient threatened realtors and felt he was being ripped off   - Clifford reports that she started to be afraid to be around him alone  - When he was found in the hotel, he had a generator full of gasoline, binoculars, bullets, and hunting equipment.     10/21/24: updated daughter on Santos's treatment plan      10/23/24: updated daughter on Santos's treatment plan      10/25/24: updated daughter on Santos's treatment plan, including plan to try and get MRI brain done under sedation. She said that Santos's grandfather had dementia and his sister has a brain tumor.      10/28/24: updated daughter on Santos's MRI results. She is planning on coming into town soon.      Care conference 11/1/24 with daughters Maria C and Estefani and ex-wife Clifford  - Discussed dementia diagnosis   - Clifford asked about plans moving forward. Explained that applying for medical assistance is the first step. Explained that application processing time can be very variable. Informed family that Santos will most likely be staying on this inpatient unit during this time.   - Clifford expressed that their family would like to be the power of /have conservatorship for Santos.   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 Reports that he has bills that he has not paid.   - Maria C is planning to move to [place], and Clifford currently lives in [place]. Family prefer that Santos would in a facility that are near these locations. Discussed  with family that they can also try to request a specific location (memory care).   - Clifford reports that he had previously gotten help applying for his social security and medicare, but unsure if it had been approved.   - Informed family that there is a geriatric unit and there might be a transfer if there becomes availability on this unit.   - Family shared that he was completely different just two months ago.   - Estefani shared that his family found his medications in his old truck recently, expressed that it was likely that he was not taking his medications prior to his hospitalization.      11/6/24: updated Maria C on plan to try and transfer Santos to Geriatric unit.      11/11/24: updated Maria C on neurology consult      The risks, benefits, alternatives, and side effects were discussed and understood by the patient and other caregivers.     Medical Assessment and Plan     Medical diagnoses to be addressed this admission:    # Hypertension  - Continue PTA medications  Furosemide 40 mg daily  Lisinopril 40 mg daily  Metoprolol 50 mg daily  Amlodipine 10 mg daily  Clonidine 0.1 mg BID     # Hyperlipidemia  - Continue PTA Atorvastatin 40 mg     # Primary Hyperparathyroidism  # Hypercalcemia, hypophosphoremia   Increased Ca level to 10.9 and decreased Ph level to 2.3 in the ED. Suspicion patient's hypercalcemia could be contributing to symptoms of psychosis.  - Consulted endocrinology, who started cinacalcet 30 mg BID on 10/13  - 10/16 endocrinology recommended continuing to trend calcium and albumin to make sure patient does not become hypocalcemic and recommended holding cinacalcet   - 10/17, calcium and albumin wnl, endo recommended cinacalcet 30 mg once daily   - 10/21, per endo continue cincaclcet and recheck calcium and albumin on October 24  - 10/23: per endo, patient needs to follow up outpatient for further management of hyperparathyroidism      Medical course: Patient was physically examined by the ED prior  to being transferred to the unit and was found to be medically stable and appropriate for admission.      Consults: Psychiatry, Endocrinology (follow-up of hyperthyroidism / hypercalcemia and hypertension), neurology     Checklist     Legal Status: Committed   MI Commitment with [facility]  File Number: [id number]  Start and expiration date of commitment: 10/24/24 - 04/24/25     [facility] meds: Haldol, Clozaril, Risperdal, Invega, Zyprexa, Seroquel, Abilify     PPS/CM:  Shelby Chowdary: [phone]  [email or web address].    Guardian/Conservator:  Clifford Aaron is currently emergency until 01/20/2025.       Safety Assessment:   Behavioral Orders   Procedures    Code 1 - Restrict to Unit    Routine Programming     As clinically indicated    Status 15     Every 15 minutes.    Status Individual Observation     SIO AT NIGHT     Patient SIO status reviewed with team/RN.  Please also refer to RN/team documentation for add'l detail.    -SIO staff to monitor following which have contributed to patient being on SIO:  Disruptive behavior at night     -When following observed, team will review discontinuation of SIO:  Less disruptive and redirectable at night     Order Specific Question:   CONTINUOUS 24 hours / day     Answer:   Other     Order Specific Question:   Specify distance     Answer:   10 ft     Order Specific Question:   Indications for SIO     Answer:   Severe intrusiveness       Risk Assessment:  Risk for harm is low  Risk factors: impulsive and past behaviors  Protective factors: family      Disposition: Pending stabilization, medication optimization, & development of a safe discharge plan.     Attestations     Resident without student: This patient was seen and discussed with my attending physician.    Rocío Orellana MD  [facility] Psychiatry Resident  12/17/2024    Attestation:  This patient has been seen and evaluated by me, Pete Smith MD.  I have discussed this patient with  the house staff team including the resident and/or medical student and I agree with the findings and plan in this note.    I have reviewed today's vital signs, medications, labs and imaging. Pete Smith MD , PhD.

## 2024-12-17 NOTE — PLAN OF CARE
BEH IP Unit Acuity Rating Score (UARS)  Patient is given one point for every criteria they meet.    CRITERIA SCORING   On a 72 hour hold, court hold, committed, stay of commitment, or revocation. 1    Patient LOS on BEH unit exceeds 20 days. 1  LOS: 66   Patient under guardianship, 55+, otherwise medically complex, or under age 11. 1   Suicide ideation without relief of precipitating factors. 0   Current plan for suicide. 0   Current plan for homicide. 0   Imminent risk or actual attempt to seriously harm another without relief of factors precipitating the attempt. 1   Severe dysfunction in daily living (ex: complete neglect for self care, extreme disruption in vegetative function, extreme deterioration in social interactions). 1   Recent (last 7 days) or current physical aggression in the ED or on unit. 0   Restraints or seclusion episode in past 72 hours. 0   Recent (last 7 days) or current verbal aggression, agitation, yelling, etc., while in the ED or unit. 0   Active psychosis. 1   Need for constant or near constant redirection (from leaving, from others, etc).  0   Intrusive or disruptive behaviors. 1   Patient requires 3 or more hours of individualized nursing care per 8-hour shift (i.e. for ADLs, meds, therapeutic interventions). 1   TOTAL 8

## 2024-12-17 NOTE — PLAN OF CARE
Problem: Sleep Disturbance  Goal: Adequate Sleep/Rest  Outcome: Progressing    Pt appears to have slept for 3.25 hours.     He appears to be disorganized, and not easily  redirected by the SIO staff. PRN Tylenol was given for generalized body pain and Trazodone was given to promote sleep. PRN Tylenol  was effective, as there was no further complain of pain, but PRN Trazodone was partially effective. Pt denies anxiety, depression, and SI/SIB.  Pt is on 1:1 SIO for severe intrusiveness during night shift. 15 minutes safety checks were in place. Staff will continue to offer support to pt.

## 2024-12-18 PROCEDURE — 124N000002 HC R&B MH UMMC

## 2024-12-18 PROCEDURE — 250N000013 HC RX MED GY IP 250 OP 250 PS 637

## 2024-12-18 PROCEDURE — 90853 GROUP PSYCHOTHERAPY: CPT

## 2024-12-18 PROCEDURE — 99231 SBSQ HOSP IP/OBS SF/LOW 25: CPT | Mod: GC | Performed by: PSYCHIATRY & NEUROLOGY

## 2024-12-18 RX ADMIN — AMLODIPINE BESYLATE 10 MG: 5 TABLET ORAL at 09:00

## 2024-12-18 RX ADMIN — METOPROLOL SUCCINATE 50 MG: 50 TABLET, EXTENDED RELEASE ORAL at 09:00

## 2024-12-18 RX ADMIN — CLONIDINE HYDROCHLORIDE 0.1 MG: 0.1 TABLET ORAL at 09:01

## 2024-12-18 RX ADMIN — OLANZAPINE 5 MG: 5 TABLET, ORALLY DISINTEGRATING ORAL at 21:17

## 2024-12-18 RX ADMIN — ATORVASTATIN CALCIUM 40 MG: 20 TABLET, FILM COATED ORAL at 09:01

## 2024-12-18 RX ADMIN — ACETAMINOPHEN 650 MG: 325 TABLET, FILM COATED ORAL at 20:25

## 2024-12-18 RX ADMIN — CLONIDINE HYDROCHLORIDE 0.1 MG: 0.1 TABLET ORAL at 21:17

## 2024-12-18 RX ADMIN — ACETAMINOPHEN 650 MG: 325 TABLET, FILM COATED ORAL at 06:26

## 2024-12-18 RX ADMIN — Medication 25 MG: at 21:17

## 2024-12-18 RX ADMIN — ACETAMINOPHEN 650 MG: 325 TABLET, FILM COATED ORAL at 09:41

## 2024-12-18 RX ADMIN — MELATONIN TAB 3 MG 3 MG: 3 TAB at 21:27

## 2024-12-18 RX ADMIN — LISINOPRIL 40 MG: 10 TABLET ORAL at 09:00

## 2024-12-18 RX ADMIN — CINACALCET 30 MG: 30 TABLET ORAL at 09:00

## 2024-12-18 RX ADMIN — Medication 1 PATCH: at 09:01

## 2024-12-18 RX ADMIN — FUROSEMIDE 40 MG: 40 TABLET ORAL at 09:01

## 2024-12-18 ASSESSMENT — ACTIVITIES OF DAILY LIVING (ADL)
ORAL_HYGIENE: INDEPENDENT;PROMPTS
ADLS_ACUITY_SCORE: 74
ADLS_ACUITY_SCORE: 54
HYGIENE/GROOMING: HANDWASHING;INDEPENDENT
ADLS_ACUITY_SCORE: 54
ADLS_ACUITY_SCORE: 74
ADLS_ACUITY_SCORE: 74
ADLS_ACUITY_SCORE: 54
DRESS: STREET CLOTHES;INDEPENDENT;PROMPTS
ADLS_ACUITY_SCORE: 54
ADLS_ACUITY_SCORE: 54
ADLS_ACUITY_SCORE: 74
ADLS_ACUITY_SCORE: 54
LAUNDRY: WITH SUPERVISION
ADLS_ACUITY_SCORE: 54
LAUNDRY: UNABLE TO COMPLETE
ADLS_ACUITY_SCORE: 54
ORAL_HYGIENE: INDEPENDENT;PROMPTS
HYGIENE/GROOMING: HANDWASHING;INDEPENDENT;PROMPTS
DRESS: STREET CLOTHES;INDEPENDENT

## 2024-12-18 NOTE — PLAN OF CARE
BEH IP Unit Acuity Rating Score (UARS)  Patient is given one point for every criteria they meet.    CRITERIA SCORING   On a 72 hour hold, court hold, committed, stay of commitment, or revocation. 1    Patient LOS on BEH unit exceeds 20 days. 1  LOS: 67   Patient under guardianship, 55+, otherwise medically complex, or under age 11. 1   Suicide ideation without relief of precipitating factors. 0   Current plan for suicide. 0   Current plan for homicide. 0   Imminent risk or actual attempt to seriously harm another without relief of factors precipitating the attempt. 1   Severe dysfunction in daily living (ex: complete neglect for self care, extreme disruption in vegetative function, extreme deterioration in social interactions). 1   Recent (last 7 days) or current physical aggression in the ED or on unit. 0   Restraints or seclusion episode in past 72 hours. 0   Recent (last 7 days) or current verbal aggression, agitation, yelling, etc., while in the ED or unit. 0   Active psychosis. 1   Need for constant or near constant redirection (from leaving, from others, etc).  0   Intrusive or disruptive behaviors. 1   Patient requires 3 or more hours of individualized nursing care per 8-hour shift (i.e. for ADLs, meds, therapeutic interventions). 1   TOTAL 8

## 2024-12-18 NOTE — PLAN OF CARE
Team Note Due:  Monday    Assessment/Intervention/Current Symtoms and Care Coordination:  Chart review and met with team, discussed pt progress, symptomology, and response to treatment.  Discussed the discharge plan and any potential impediments to discharge. Clifford Aaron is currently emergency guardian/conservator until 01/20/2025.    Discharge Plan or Goal:  Memory care facility     Barriers to Discharge:  Patient requires further psychiatric stabilization due to current symptomology, medication management with changes subject to provider, coordination with outside supports, and aftercare planning. Pt is under civil commitment.     Referral Status:    Memory Care facilities currently in process:  [facility]. Tour completed 11/27.  [facility]. [facility]. Records sent 12/2/24.  Lorraine (): [email or web address]; [phone]  Isatu (director of nursing): [email or web address]  [facility]. Tour completed 11/29.  [facility]. Tour completed 11/30.  [facility]. Records sent 12/16/14.  Nikki Noel: [email or web address]; Office [phone], Fax [phone]    Memory Care facilities pending family review:  [facility].   [facility].  [facility].  [facility].  [facility].  [facility].    Memory Care facilities declined:  [facility] (private pay only, no EW).  [facility] (private pay only, no EW).  [facility] (no openings).     Legal Status:  MI Commitment with [facility]  File Number: [id number]  Start and expiration date of commitment: 10/24/24 - 04/24/25    [facility] meds: Haldol, Clozaril, Risperdal, Invega, Zyprexa, Seroquel, Abilify    PPS/CM:  Shelby Chowdary:  [phone]  [email or web address].    Contacts:  Maria C Aaron (Daughter): [phone]   Clifford Aaron (ex-wife): [phone]     No Del Angel (guardianship/conservatorship ): [phone]  [email or web address]     Upcoming Meetings and Dates/Important Information and next steps:  Follow up with family regarding list of Memory Care facilities  Discharge planning when appropriate  Pt does not qualify for MA per financial counselor on 11/8/2024. Pt will likely privately pay for Memory Care until he qualifies for MA/waivers in the future.  M[facility]hoice Assessment scheduled on 12/31 at 10:00am with Amanda Beard.  Call scheduling team if pt is discharged or if this needs to be rescheduled: [phone]     Provisional Discharge and Change of Status needed at discharge

## 2024-12-18 NOTE — PROGRESS NOTES
[facility]    Billing code: [id number]  Start time:1020  End time: 1120  Patient total time in session:  50 minutes    Interventions:  Session theme focused on organization, self-regulation.  Energy levels varied throughout the session and this was reflected in higher or lower energy movement.  Stretching and balancing began to calm, then bounce and swing allowed for a greater range of expression.  Attention focused on self in the environment and our impact on others.  Noticing the ways we trust our own bodies and the response of others to that trust.  Sources of knowledge come from the entire nervous system.      Patient response:  Patient was inconsistent, starting with appearing fatigued, sitting and not joining movement but sharing verbally.  He then joined some movement from the chair, but slipped in and out of fatigue and a brusque approach to participation in interventions.    Billing code: [id number]

## 2024-12-18 NOTE — PLAN OF CARE
Goal Outcome Evaluation:    Plan of Care Reviewed With: patient Plan of Care Reviewed With: patient    Overall Patient Progress: improvingOverall Patient Progress: improving           Problem: Adult Behavioral Health Plan of Care  Goal: Plan of Care Review  Outcome: Progressing  Flowsheets  Taken 12/18/2024 1254  Plan of Care Reviewed With: patient  Overall Patient Progress: improving  Patient Agreement with Plan of Care: agrees  Taken 12/18/2024 1248  Patient Agreement with Plan of Care: (pt remains disorganized with delusional thoughts) agrees    Pt was up and visible in the unit majority of this shift. Pt was observed sitting on the lounge watching TV. Pt eat and drinking adequately. Hygiene appears appropriate to situation. Pt has pitting edema to both ankles +3 and has an order to wear compression stockings but pt declines. Pt complains of chronic back and right hip pain. PRN Tylenol was utilized with minimum affect. Pt affect is disorganized and lack of insight. Pt denied all psych symptoms. Pt complaint with medications. Pt remains on SIO 1:1 at night. VSS. Blood pressure 128/75, pulse 63, temperature 97.8  F (36.6  C), resp. rate 18, weight 110.6 kg (243 lb 12.8 oz), SpO2 95%.

## 2024-12-18 NOTE — PROGRESS NOTES
"  ----------------------------------------------------------------------------------------------------------  [facility]  Psychiatry Progress Note  Hospital Day #67     Interim History:     The patient's care was discussed with the treatment team and chart notes were reviewed.    Identifier: Estevan Aaron is a 64 year old male with previous psychiatric diagnoses of  generalized anxiety disorder, Neurocognitive disorder, admitted from the ED 10/12/2024 due to concern for HI and psychosis in the context of medical issues (hyperthyroidism, hypercalcemia) psychosocial stressors including family dynamics with recent possible divorce.    Vitals: VSS  Sleep: 5.5 hours (12/18/24 0615)  Scheduled medications: Took all scheduled medications as prescribed  Psychiatric PRN medications:   Last 24H PRN:     acetaminophen (TYLENOL) tablet 650 mg, 650 mg at 12/18/24 0941      Staff Report:   Santos slept in the lounge for most of the shift. Patient reported right hip pain and  PRN tylenol was given and somewhat effective. No new concern at this time.  Yesterday he was visible in the [facility], speech remains tangential with word of ideas, he started mumbling to be given the remote to take control but he was able to be redirectable. Pt has been sitting on [facility] calmly watching sports and dozing on and off. Though patient tried few times to pour of cup of am medications into his cup of water. This staff eventually put his am meds on a spoon and pt took his meds. He was encouraged to attend groups.    See staff notes for more information     Subjective:     Patient Interview:  Santos was met at the [facility], he mentions to feel good. When asked what he was doing, he started mumbling some words without any particular connection (\"pen\", \"flow?\"). Later patient started to dose off. Team addresses that if he has any questions or need any help at the moment. Santos mentions that he is okay.    ROS:  See " above     Objective:     Vitals:  /75   Pulse 63   Temp 97.8  F (36.6  C)   Resp 18   Wt 110.6 kg (243 lb 12.8 oz)   SpO2 95%   BMI 39.35 kg/m      Allergies:  No Known Allergies    Current Medications:  Scheduled:  Current Facility-Administered Medications   Medication Dose Route Frequency Provider Last Rate Last Admin    acetaminophen (TYLENOL) tablet 650 mg  650 mg Oral Q4H PRN Nikki Caceres MD   650 mg at 12/18/24 0941    alum & mag hydroxide-simethicone (MAALOX) suspension 30 mL  30 mL Oral Q4H PRN Nikki Caceres MD   30 mL at 10/17/24 0837    amLODIPine (NORVASC) tablet 10 mg  10 mg Oral Daily Presley Barrera MD   10 mg at 12/18/24 0900    atorvastatin (LIPITOR) tablet 40 mg  40 mg Oral Daily Nikki Caceres MD   40 mg at 12/18/24 0901    cholecalciferol (VITAMIN D3) capsule 1,250 mcg  1,250 mcg Oral Q7 Days Evelia Grimm DO   1,250 mcg at 12/17/24 1234    cinacalcet (SENSIPAR) tablet 30 mg  30 mg Oral Daily Presley Barrera MD   30 mg at 12/18/24 0900    cloNIDine (CATAPRES) tablet 0.1 mg  0.1 mg Oral BID Presley Barrera MD   0.1 mg at 12/18/24 0901    furosemide (LASIX) tablet 40 mg  40 mg Oral Daily Presley Barrera MD   40 mg at 12/18/24 0901    gabapentin (NEURONTIN) capsule 100 mg  100 mg Oral Q6H PRN Nikki Caceres MD   100 mg at 11/15/24 0418    hydrocortisone (CORTAID) 0.5 % cream   Topical Daily PRN Savi Chamorro MD        lisinopril (ZESTRIL) tablet 40 mg  40 mg Oral Daily Presley Barrera MD   40 mg at 12/18/24 0900    melatonin tablet 3 mg  3 mg Oral At Bedtime Presley Barrera MD   3 mg at 12/17/24 2125    metoprolol succinate ER (TOPROL XL) 24 hr tablet 50 mg  50 mg Oral Daily Presley Barrera MD   50 mg at 12/18/24 0900    nicotine (NICODERM CQ) 14 MG/24HR 24 hr patch 1 patch  1 patch Transdermal Daily Evelia Grimm DO   1 patch at 12/18/24 0901    nicotine (NICORETTE) gum 2 mg  2 mg Buccal Q1H PRN González Garcia MD   2 mg at 10/24/24 1254    OLANZapine zydis  (zyPREXA) ODT tab 5 mg  5 mg Oral At Bedtime González Garcia MD   5 mg at 12/17/24 2125    Or    OLANZapine (zyPREXA) injection 10 mg  10 mg Intramuscular At Bedtime González Garcia MD        OLANZapine (zyPREXA) tablet 5 mg  5 mg Oral TID PRN Evelia Grimm DO   5 mg at 11/24/24 0436    Or    OLANZapine (zyPREXA) injection 5 mg  5 mg Intramuscular TID PRN Evelia Grimm DO        polyethylene glycol (MIRALAX) Packet 17 g  17 g Oral Daily PRN Nikki Caceres MD        traZODone (DESYREL) half-tab 25 mg  25 mg Oral At Bedtime Rocío Lopez MD   25 mg at 12/17/24 2124    traZODone (DESYREL) half-tab 25 mg  25 mg Oral At Bedtime PRN Evelia Grimm DO   25 mg at 12/17/24 0332       PRN:  Current Facility-Administered Medications   Medication Dose Route Frequency Provider Last Rate Last Admin    acetaminophen (TYLENOL) tablet 650 mg  650 mg Oral Q4H PRN Nikki Caceres MD   650 mg at 12/18/24 0941    alum & mag hydroxide-simethicone (MAALOX) suspension 30 mL  30 mL Oral Q4H PRN Nikki Caceres MD   30 mL at 10/17/24 0837    amLODIPine (NORVASC) tablet 10 mg  10 mg Oral Daily Presley Barrera MD   10 mg at 12/18/24 0900    atorvastatin (LIPITOR) tablet 40 mg  40 mg Oral Daily Nikki Caceres MD   40 mg at 12/18/24 0901    cholecalciferol (VITAMIN D3) capsule 1,250 mcg  1,250 mcg Oral Q7 Days Evelia Grimm DO   1,250 mcg at 12/17/24 1234    cinacalcet (SENSIPAR) tablet 30 mg  30 mg Oral Daily Presley Barrera MD   30 mg at 12/18/24 0900    cloNIDine (CATAPRES) tablet 0.1 mg  0.1 mg Oral BID Presley Barrera MD   0.1 mg at 12/18/24 0901    furosemide (LASIX) tablet 40 mg  40 mg Oral Daily Presley Barrera MD   40 mg at 12/18/24 0901    gabapentin (NEURONTIN) capsule 100 mg  100 mg Oral Q6H PRN Nikki Caceres MD   100 mg at 11/15/24 0418    hydrocortisone (CORTAID) 0.5 % cream   Topical Daily PRN Savi Chamorro MD        lisinopril (ZESTRIL) tablet 40 mg  40 mg Oral Daily Bruce,  MD Presley   40 mg at 12/18/24 0900    melatonin tablet 3 mg  3 mg Oral At Bedtime Presley Barrera MD   3 mg at 12/17/24 2125    metoprolol succinate ER (TOPROL XL) 24 hr tablet 50 mg  50 mg Oral Daily Presley Barrera MD   50 mg at 12/18/24 0900    nicotine (NICODERM CQ) 14 MG/24HR 24 hr patch 1 patch  1 patch Transdermal Daily Evelia Grimm DO   1 patch at 12/18/24 0901    nicotine (NICORETTE) gum 2 mg  2 mg Buccal Q1H PRN González Garcia MD   2 mg at 10/24/24 1254    OLANZapine zydis (zyPREXA) ODT tab 5 mg  5 mg Oral At Bedtime González Garcia MD   5 mg at 12/17/24 2125    Or    OLANZapine (zyPREXA) injection 10 mg  10 mg Intramuscular At Bedtime González Garcia MD        OLANZapine (zyPREXA) tablet 5 mg  5 mg Oral TID PRN Evelia Grimm DO   5 mg at 11/24/24 0436    Or    OLANZapine (zyPREXA) injection 5 mg  5 mg Intramuscular TID PRN Evelia Grimm DO        polyethylene glycol (MIRALAX) Packet 17 g  17 g Oral Daily PRN Nikki Caceres MD        traZODone (DESYREL) half-tab 25 mg  25 mg Oral At Bedtime Rocío Lopez MD   25 mg at 12/17/24 2124    traZODone (DESYREL) half-tab 25 mg  25 mg Oral At Bedtime PRN Evelia Grimm DO   25 mg at 12/17/24 0332     Labs and Imaging:  Data this admission:  - CBC unremarkable  - CMP unremarkable  - TSH normal  - UDS negative  - Vit D low  - Hgb A1c 5.9 (10/13/24)  - Lipids unremarkable  - Vit B12 normal  - Folate normal  - Urinalysis unremarkable  - EKG normal sinus rhythm, QTc 390 ms  - Head CT showed no acute changes  - HIV non reactive  - Treponema antibody non reactive  - ESR wnl   - Ceruloplasmin wnl   - BOOGIE negative  - Lyme negative      Parathyroid hormone:   10/13: 85     Calcium:  10/14: 11.4  10/16: 10.3  10/17: 10.3  10/19: 9.8  10/21: 10.6  10/23: 10.3     Albumin:  10/14: 4.3  10/16: 4.3  10/17: 4.1  10/19: 4.2  10/21: 4.5  10/23: 4.3      CXR:   Impression:   1. No definite radiographic evidence of asbestosis. If clinically  indicated,  "consider evaluation with high resolution chest CT.  2. Nonspecific left costophrenic blunting. Given symmetrically low  lung volumes may be related to poor inspiratory effort/timing.  3. Pulmonary vascular congestion.     MRI:   IMPRESSION:  1. No acute intracranial pathology.  2. No focal lesion or structural abnormality.   3. Symmetric frontoparietal cortical volume loss slightly more than  expected for age.     Mental Status Exam:   Oriented to: Oriented to self and time only  General:  Awake and Alert  Appearance:  appears stated age, Grooming is adequate, and Dressed in his own clothes  Behavior/Attitude:  Disengaged, Easy to redirect, Easily distracted, and sleepy  Eye Contact: Appropriate for conversation  Psychomotor: No evidence of tics, dystonia, or tardive dyskinesia  no catatonia present  Speech:  appropriate volume/tone and slurred due to being sleepy  Language: Fluent in English with appropriate syntax and vocabulary.  Mood:  \"good\"  Affect:  confused  Thought Process:  tangential and confused  Thought Content:   did not assess SI/HI/ delusion of confusion . Patient denies paranoia  Associations:  loose  Insight:  impaired   Judgment:  impaired   Impulse control: limited  Attention Span:   mildly decreased  Concentration:   distractible  Recent and Remote Memory:   remote memory intact, recent memory impaired  Fund of Knowledge: average  Muscle Strength and Tone: normal  Gait and Station: Normal     Psychiatric Assessment     Estevan Aaron is a 64 year old male with previous psychiatric diagnoses of GEORGINA admitted from the ER on 10/12/2024 due to concern for HI and psychosis in the context of medical issues (hyperthyroidism, hypercalcemia), psychosocial stressors including with recent divorce and selling his home. This is the patient's first psychiatric hospitalization. Significant symptoms on admission included delusions of persecution with grandiose beliefs, homicidal ideations, as well as disorganized " thinking and behavior. The MSE on admission was pertinent for a thought process which was perseverative, circumstantial, tangential, disorganized and tangential; with rambling and looseness of associations. Psychological contributions to presentation included lack of insight. Social factors contributing to presentation included isolation, recent divorce, selling his house, and moving from hotel to hotel. Biological contributions to presentation included a history of hyperparathyroidism with chronic hypercalcemia per charts as well as a history of methamphetamine use per collateral from ex-wife.     History was difficult to obtain due to the patient's severe disorganized thinking on interview; he was observed with persecutory delusions pertaining to the realtor and gang members, disorganized behavior as these delusions have led him to flee to different hotels to stay safe/ has called  complaining of being targeted and auditory hallucinations of voices for the past 12 months. Per collateral from ex-wife, Santos has had paranoid ideations since they first met. He has always felt like people are out to get him or trying to rip him off. She says that he has had visual hallucinations (he will point things out that the rest of the family can't see) for a long time, but the family would just go along with him to avoid making him angry. This timeline and presentation could be consistent with diagnosis of paranoid personality disorder. Things began to worsen around the beginning of the COVID pandemic, when Santos became more isolated and the family started to notice cognitive issues such as impaired memory. Immediately prior to this hospitalization, the ex-wife found Santos in a hotel room with a generator full of gasoline, binoculars, and hunting equipment. This time course does not suggest acute psychosis, however given the patient has never had a full psychiatric work-up, we completed a first-episode psychosis work-up.       Other things that could be contributing to his presentation is hyperparathyroidism with hypercalcemia. However, patient's calcium returned to normal limits on 10/16, and patient continues to have psychosis so likely not a significant contributing factor to patient's presentation. Still we will assess with new labs tomorrow.     Patient also continues to be disoriented and his behaviors appear to worsen in the evenings, this seems to be due to current Neurocognitive Disorder diagnosed, this could evolved to be his new baseline. Patient currently with no more improvement of bizarre behaviors with current neuroleptic dose, patient probably wont benefit from any modification in current therapy. Still patient did not present with any new episodes of physical agitation and is able to attend groups and interact with peers without major altercations.     Goal of treatment: pending placement to a memory care facility long term.         Psychiatric Plan by Diagnosis   Today's changes:  - none    # Major Neurocognitive Disorder  # Rule out paranoid personality disorder  1. Medications:  - Olanzapine 5 mg at bedtime  - Neurology consult 11/8/24, recommend outpatient follow-up and neuropsychiatric testing     2. Pertinent Labs/Monitoring:   - Re-eval Calcium parameters tomorrow     3. Additional Plans:  - Patient will be treated in therapeutic milieu with appropriate individual and group therapies as described  - Patient is Commitment with [facility]  - [facility] meds: Haldol, Clozaril, Risperdal, Invega, Zyprexa, Seroquel, Abilify  - 12/12/24: Psych team had phone call meeting with [facility] facility personnel  - Pending responses primary from [facility] and [facility]  - MNChoice Assessment scheduled on 12/31 at 10:00am with Amanda Beard.       # Unspecified anxiety vs Generalized Anxiety Disorder  - Monitor for symptoms.  - Fluoxetine held due to suspicion of ongoing manic symptoms     Psychiatric  Hospital Course:      Estevan Aaron was admitted to [facility] on a 72 hour hold.   Medications:  PTA fluoxetine was held due to concern for worsening of zelalem   New medications started at the time of admission include Zyprexa.   Increased olanzapine 10 mg at bedtime was to 10 mg BID (10/14)   Increased olanzapine 10 mg BID to 10 mg during day and 15 mg at bedtime (10/15)  10/21: increased olanzapine pm dose from 15 to 20 mg  10/23: started melatonin 3 mg at 7 pm to help patient with circadian rhythm as he has been staying up throughout the night and sleeping a lot during the day and there is some concern for delirium   10/24: consulted anesthesia for MRI brain   10/28: MRI brain complete, decrease AM olanzapine from 10 to 5 mg  10/29: Morning olanzapine decreased to 2.5 mg, evening olanzapine decreased to 15 mg   11/1: Decreased evening olanzapine to 10 mg   11/4: Decreased evening olanzapine to 7.5 mg   11/5: Decreased evening olanzapine to 5 mg   11/11: Moved morning dose of olanzapine to the afternoon as patient appears more agitated in the afternoons/evenings  11/21: Started memantine 5 mg daily   11/26: Discontinued olanzapine 2.5 mg during the afternoon  12/2:   Discontinued memantine 5 mg, daily     Care conference 10/18/24 with daughter Maria C and ex-wife Clifford  - Report that patient has always been paranoid since ex-wife first met him. She describes him always feeling like he is getting ripped off.   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 They reported once incidence in which  he made a bid on a job and the client paid him, but he never ended up finishing the job  - Wife and him  officially this year, and since selling the house several months ago, patient has been moving from hotel to hotel due to thinking people are out to get him   - When they were selling their house, patient threatened realtors and felt he was being ripped off   - Clifford reports that she started to be afraid to be around him alone  - When he was found in the hotel, he had a generator full of gasoline, binoculars, bullets, and hunting equipment.     10/21/24: updated daughter on Santos's treatment plan      10/23/24: updated daughter on Santos's treatment plan      10/25/24: updated daughter on Santos's treatment plan, including plan to try and get MRI brain done under sedation. She said that Santos's grandfather had dementia and his sister has a brain tumor.      10/28/24: updated daughter on Santos's MRI results. She is planning on coming into town soon.      Care conference 11/1/24 with daughters Maria C and Estefani and ex-wife Clifford  - Discussed dementia diagnosis   - Clifford asked about plans moving forward. Explained that applying for medical assistance is the first step. Explained that application processing time can be very variable. Informed family that Santos will most likely be staying on this inpatient unit during this time.   - Clifford expressed that their family would like to be the power of /have conservatorship for Santos.   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 Reports that he has bills that he has not paid.   - Maria C is planning to move to [place], and Clifford currently lives in [place]. Family prefer that Santos would in a facility that are near these locations. Discussed with family that they can also try to request a specific location (memory care).   - Clifford reports that he had previously gotten help applying for his social  security and medicare, but unsure if it had been approved.   - Informed family that there is a geriatric unit and there might be a transfer if there becomes availability on this unit.   - Family shared that he was completely different just two months ago.   - Estefani shared that his family found his medications in his old truck recently, expressed that it was likely that he was not taking his medications prior to his hospitalization.      11/6/24: updated Maria C on plan to try and transfer Santos to Geriatric unit.      11/11/24: updated Maria C on neurology consult      The risks, benefits, alternatives, and side effects were discussed and understood by the patient and other caregivers.     Medical Assessment and Plan     Medical diagnoses to be addressed this admission:    # Hypertension  - Continue PTA medications  Furosemide 40 mg daily  Lisinopril 40 mg daily  Metoprolol 50 mg daily  Amlodipine 10 mg daily  Clonidine 0.1 mg BID     # Hyperlipidemia  - Continue PTA Atorvastatin 40 mg     # Primary Hyperparathyroidism  # Hypercalcemia, hypophosphoremia   Increased Ca level to 10.9 and decreased Ph level to 2.3 in the ED. Suspicion patient's hypercalcemia could be contributing to symptoms of psychosis.  - Consulted endocrinology, who started cinacalcet 30 mg BID on 10/13  - 10/16 endocrinology recommended continuing to trend calcium and albumin to make sure patient does not become hypocalcemic and recommended holding cinacalcet   - 10/17, calcium and albumin wnl, endo recommended cinacalcet 30 mg once daily   - 10/21, per endo continue cincaclcet and recheck calcium and albumin on October 24  - 10/23: per endo, patient needs to follow up outpatient for further management of hyperparathyroidism      Medical course: Patient was physically examined by the ED prior to being transferred to the unit and was found to be medically stable and appropriate for admission.      Consults: Psychiatry, Endocrinology (follow-up of  hyperthyroidism / hypercalcemia and hypertension), neurology     Checklist     Legal Status: Committed   MI Commitment with [facility]  File Number: [id number]  Start and expiration date of commitment: 10/24/24 - 04/24/25     [facility] meds: Haldol, Clozaril, Risperdal, Invega, Zyprexa, Seroquel, Abilify     PPS/CM:  Shelby Chowdary: [phone]  [email or web address]    Guardian/Conservator:  Clifford Aaron is currently emergency until 01/20/2025.       Safety Assessment:   Behavioral Orders   Procedures    Code 1 - Restrict to Unit    Routine Programming     As clinically indicated    Status 15     Every 15 minutes.    Status Individual Observation     SIO AT NIGHT     Patient SIO status reviewed with team/RN.  Please also refer to RN/team documentation for add'l detail.    -SIO staff to monitor following which have contributed to patient being on SIO:  Disruptive behavior at night     -When following observed, team will review discontinuation of SIO:  Less disruptive and redirectable at night     Order Specific Question:   CONTINUOUS 24 hours / day     Answer:   Other     Order Specific Question:   Specify distance     Answer:   10 ft     Order Specific Question:   Indications for SIO     Answer:   Severe intrusiveness       Risk Assessment:  Risk for harm is low  Risk factors: impulsive and past behaviors  Protective factors: family      Disposition: Pending stabilization, medication optimization, & development of a safe discharge plan.     Attestations     Resident without student: This patient was seen and discussed with my attending physician.    Rocío Orellana MD  [facility] Psychiatry Resident  12/18/2024    Attestation:  This patient has been seen and evaluated by me, Pete Smith MD.  I have discussed this patient with the house staff team including the resident and/or medical student and I agree with the findings and plan in this note.    I have reviewed today's vital  signs, medications, labs and imaging. Pete Smith MD , PhD.

## 2024-12-18 NOTE — PLAN OF CARE
Problem: Sleep Disturbance  Goal: Adequate Sleep/Rest  Outcome: Progressing   Goal Outcome Evaluation:    Pt slept in the lounge for most of the shift. He slept for about 5.50 hours this shift. Pt continues on SIO 1:1 for  severe intrusiveness. Pt reported right hip pain and  PRN tylenol was given and somewhat effective. No new concern at this time.  Pt most resent vitals are:  Blood pressure 122/74, pulse 60, temperature 97.4  F (36.3  C), temperature source Oral, resp. rate 18, weight 110.6 kg (243 lb 12.8 oz), SpO2 96%.

## 2024-12-18 NOTE — PLAN OF CARE
Problem: Psychotic Signs/Symptoms  Goal: Improved Mood Symptoms (Psychotic Signs/Symptoms)  Outcome: Progressing  Intervention: Optimize Emotion and Mood  Recent Flowsheet Documentation  Taken 12/18/2024 1600 by Tamie Fraser RN  Diversional Activity:   television   music  Intervention: Optimize Emotion and Mood  Recent Flowsheet Documentation  Taken 12/18/2024 1600 by Tamie Fraser RN  Diversional Activity:   television   music     Problem: Anxiety  Goal: Anxiety Reduction or Resolution  Outcome: Progressing     Problem: Manic or Hypomanic Signs/Symptoms  Goal: Improved Impulse Control (Manic/Hypomanic Signs/Symptoms)  Outcome: Progressing  Intervention: Promote Behavior and Impulse Control  Recent Flowsheet Documentation  Taken 12/18/2024 1600 by Tamie Fraser RN  De-Escalation Techniques: verbally redirected  Diversional Activity:   television   music   Goal Outcome Evaluation:  Pt was visible in the milieu watching TV majority of the shift dozing on and off.. Affect blunted and  flat. Thought process disorganized. Pt was dismissive  with  any physical pain all mental health and psych symptoms. Food and fluid intake adequate. Hygiene unkempt..Pt continuous on SIO  1:1 nights only for severe intrusive behavior. Pt was compliant with medication and contracted for safety. No behavioral concerns.

## 2024-12-19 PROCEDURE — 124N000002 HC R&B MH UMMC

## 2024-12-19 PROCEDURE — 250N000013 HC RX MED GY IP 250 OP 250 PS 637

## 2024-12-19 RX ORDER — LIDOCAINE 4 G/G
2 PATCH TOPICAL
Status: DISCONTINUED | OUTPATIENT
Start: 2024-12-19 | End: 2024-12-19

## 2024-12-19 RX ORDER — LIDOCAINE 4 G/G
1 PATCH TOPICAL
Status: DISPENSED | OUTPATIENT
Start: 2024-12-19

## 2024-12-19 RX ADMIN — DICLOFENAC SODIUM TOPICAL GEL, 1% 2 G: 10 GEL TOPICAL at 17:23

## 2024-12-19 RX ADMIN — ATORVASTATIN CALCIUM 40 MG: 20 TABLET, FILM COATED ORAL at 08:26

## 2024-12-19 RX ADMIN — Medication 25 MG: at 21:26

## 2024-12-19 RX ADMIN — CLONIDINE HYDROCHLORIDE 0.1 MG: 0.1 TABLET ORAL at 08:26

## 2024-12-19 RX ADMIN — CINACALCET 30 MG: 30 TABLET ORAL at 08:26

## 2024-12-19 RX ADMIN — CLONIDINE HYDROCHLORIDE 0.1 MG: 0.1 TABLET ORAL at 21:26

## 2024-12-19 RX ADMIN — FUROSEMIDE 40 MG: 40 TABLET ORAL at 08:26

## 2024-12-19 RX ADMIN — ACETAMINOPHEN 650 MG: 325 TABLET, FILM COATED ORAL at 12:13

## 2024-12-19 RX ADMIN — AMLODIPINE BESYLATE 10 MG: 5 TABLET ORAL at 08:26

## 2024-12-19 RX ADMIN — OLANZAPINE 5 MG: 5 TABLET, ORALLY DISINTEGRATING ORAL at 21:26

## 2024-12-19 RX ADMIN — MELATONIN TAB 3 MG 3 MG: 3 TAB at 21:26

## 2024-12-19 RX ADMIN — LIDOCAINE 4% 1 PATCH: 40 PATCH TOPICAL at 17:22

## 2024-12-19 RX ADMIN — LISINOPRIL 40 MG: 10 TABLET ORAL at 08:25

## 2024-12-19 RX ADMIN — ACETAMINOPHEN 650 MG: 325 TABLET, FILM COATED ORAL at 05:13

## 2024-12-19 RX ADMIN — Medication 1 PATCH: at 08:26

## 2024-12-19 RX ADMIN — ACETAMINOPHEN 650 MG: 325 TABLET, FILM COATED ORAL at 22:47

## 2024-12-19 ASSESSMENT — ACTIVITIES OF DAILY LIVING (ADL)
ADLS_ACUITY_SCORE: 54
LAUNDRY: WITH SUPERVISION
ADLS_ACUITY_SCORE: 54
HYGIENE/GROOMING: INDEPENDENT
ADLS_ACUITY_SCORE: 54
ORAL_HYGIENE: INDEPENDENT
ADLS_ACUITY_SCORE: 54
HYGIENE/GROOMING: HANDWASHING;SHOWER;INDEPENDENT
DRESS: STREET CLOTHES;INDEPENDENT
ADLS_ACUITY_SCORE: 54
DRESS: INDEPENDENT
LAUNDRY: WITH SUPERVISION
ADLS_ACUITY_SCORE: 54
ORAL_HYGIENE: INDEPENDENT;PROMPTS

## 2024-12-19 NOTE — PLAN OF CARE
Problem: Sleep Disturbance  Goal: Adequate Sleep/Rest  Outcome: Progressing    Pt appears to have slept for 5 hours. PRN Tylenol was given for generalized body pain rated 5/10. PRN medication was effective, as there were no further complain of pain during shift. Pt was intermittently intrusive, but was easily redirected by the SIO staff.  Pt denies anxiety, depression, and SI/SIB.  Pt is on 1:1 SIO for severe intrusiveness during night shift. 15 minutes safety checks were in place. Staff will continue to offer support to pt.

## 2024-12-19 NOTE — PLAN OF CARE
BEH IP Unit Acuity Rating Score (UARS)  Patient is given one point for every criteria they meet.    CRITERIA SCORING   On a 72 hour hold, court hold, committed, stay of commitment, or revocation. 1    Patient LOS on BEH unit exceeds 20 days. 1  LOS: 68   Patient under guardianship, 55+, otherwise medically complex, or under age 11. 1   Suicide ideation without relief of precipitating factors. 0   Current plan for suicide. 0   Current plan for homicide. 0   Imminent risk or actual attempt to seriously harm another without relief of factors precipitating the attempt. 1   Severe dysfunction in daily living (ex: complete neglect for self care, extreme disruption in vegetative function, extreme deterioration in social interactions). 1   Recent (last 7 days) or current physical aggression in the ED or on unit. 0   Restraints or seclusion episode in past 72 hours. 0   Recent (last 7 days) or current verbal aggression, agitation, yelling, etc., while in the ED or unit. 0   Active psychosis. 1   Need for constant or near constant redirection (from leaving, from others, etc).  0   Intrusive or disruptive behaviors. 1   Patient requires 3 or more hours of individualized nursing care per 8-hour shift (i.e. for ADLs, meds, therapeutic interventions). 1   TOTAL 8

## 2024-12-19 NOTE — PLAN OF CARE
Goal Outcome Evaluation:    Plan of Care Reviewed With: patient Plan of Care Reviewed With: patient    Overall Patient Progress: improvingOverall Patient Progress: improving       Problem: Adult Behavioral Health Plan of Care  Goal: Plan of Care Review  Outcome: Progressing  Flowsheets  Taken 12/19/2024 1320  Plan of Care Reviewed With: patient  Overall Patient Progress: improving  Patient Agreement with Plan of Care: agrees  Taken 12/19/2024 1000  Patient Agreement with Plan of Care: agrees     Pt was up and visible in the unit majority of this shift. Pt was observed sitting on the lounge watching TV. Pt eat and drinking adequately. Hygiene appears appropriate to situation. Pt has pitting edema to both ankles +3 and has an order to wear compression stockings but pt declines. Pt complains of chronic back and right hip pain. Rated 9/10. PRN Tylenol was utilized with minimum affect. Hot pack and pillows adjustments was utilized. Pt will benefits from hospital bed. Pt affect is disorganized and lack of insight. Pt denied all psych symptoms. Pt complaint with medications. Pt remains on SIO 1:1 at night. VSS. Metoprolol was held due to HR being less than 60. Blood pressure 123/75, pulse 59, temperature 97.5  F (36.4  C), temperature source Oral, resp. rate 18, weight 110.6 kg (243 lb 12.8 oz), SpO2 94%.

## 2024-12-19 NOTE — PROGRESS NOTES
"  ----------------------------------------------------------------------------------------------------------  [facility]  Psychiatry Progress Note  Hospital Day #68     Interim History:     The patient's care was discussed with the treatment team and chart notes were reviewed.    Identifier: Estevan Aaron is a 64 year old male with previous psychiatric diagnoses of  generalized anxiety disorder, Neurocognitive disorder, admitted from the ED 10/12/2024 due to concern for HI and psychosis in the context of medical issues (hyperthyroidism, hypercalcemia) psychosocial stressors including family dynamics with recent possible divorce.    Vitals: VSS  Sleep: 5 hours (12/19/24 0600)  Scheduled medications: Took all scheduled medications as prescribed  Psychiatric PRN medications:   Last 24H PRN:     acetaminophen (TYLENOL) tablet 650 mg, 650 mg at 12/19/24 0513      Staff Report:   Santos was able to sleep well at night. Took PRN Tylenol was given for generalized body pain rated 5/10 and was effective, as there were no further complain of pain during shift. Patient was intermittently intrusive, but was easily redirected by the SIO staff. He denies anxiety, depression, and SI/SIB.     See staff notes for more information     Subjective:     Patient Interview:  Santos states he is doing \"fine\" this morning and had a delicious breakfast. He says he will let us know if he needs anything from the team. Says he probably will do some painting today.     ROS:  See above     Objective:     Vitals:  /75   Pulse 59   Temp 97.5  F (36.4  C) (Oral)   Resp 18   Wt 110.6 kg (243 lb 12.8 oz)   SpO2 94%   BMI 39.35 kg/m      Allergies:  No Known Allergies    Current Medications:  Scheduled:  Current Facility-Administered Medications   Medication Dose Route Frequency Provider Last Rate Last Admin    acetaminophen (TYLENOL) tablet 650 mg  650 mg Oral Q4H PRN Nikki Caceres MD   650 mg " at 12/19/24 0513    alum & mag hydroxide-simethicone (MAALOX) suspension 30 mL  30 mL Oral Q4H PRN Nikki Caceres MD   30 mL at 10/17/24 0837    amLODIPine (NORVASC) tablet 10 mg  10 mg Oral Daily Presley Barrera MD   10 mg at 12/19/24 0826    atorvastatin (LIPITOR) tablet 40 mg  40 mg Oral Daily Nikki Caceres MD   40 mg at 12/19/24 0826    cholecalciferol (VITAMIN D3) capsule 1,250 mcg  1,250 mcg Oral Q7 Days Evelia Grimm DO   1,250 mcg at 12/17/24 1234    cinacalcet (SENSIPAR) tablet 30 mg  30 mg Oral Daily Presley Barrera MD   30 mg at 12/19/24 0826    cloNIDine (CATAPRES) tablet 0.1 mg  0.1 mg Oral BID Presley Barrera MD   0.1 mg at 12/19/24 0826    furosemide (LASIX) tablet 40 mg  40 mg Oral Daily Presley Barrera MD   40 mg at 12/19/24 0826    gabapentin (NEURONTIN) capsule 100 mg  100 mg Oral Q6H PRN Nikki Caceres MD   100 mg at 11/15/24 0418    hydrocortisone (CORTAID) 0.5 % cream   Topical Daily PRN Savi Chamorro MD        lisinopril (ZESTRIL) tablet 40 mg  40 mg Oral Daily Presley Barrera MD   40 mg at 12/19/24 0825    melatonin tablet 3 mg  3 mg Oral At Bedtime Presley Barrera MD   3 mg at 12/18/24 2127    metoprolol succinate ER (TOPROL XL) 24 hr tablet 50 mg  50 mg Oral Daily Presley Barrera MD   50 mg at 12/18/24 0900    nicotine (NICODERM CQ) 14 MG/24HR 24 hr patch 1 patch  1 patch Transdermal Daily Evelia Grimm DO   1 patch at 12/19/24 0826    nicotine (NICORETTE) gum 2 mg  2 mg Buccal Q1H PRN González Garcia MD   2 mg at 10/24/24 1254    OLANZapine zydis (zyPREXA) ODT tab 5 mg  5 mg Oral At Bedtime González Garcia MD   5 mg at 12/18/24 2117    Or    OLANZapine (zyPREXA) injection 10 mg  10 mg Intramuscular At Bedtime González Garcia MD        OLANZapine (zyPREXA) tablet 5 mg  5 mg Oral TID PRN Evelia Grimm DO   5 mg at 11/24/24 0436    Or    OLANZapine (zyPREXA) injection 5 mg  5 mg Intramuscular TID PRN Evelia Grimm DO        polyethylene glycol (MIRALAX) Packet 17 g   17 g Oral Daily PRN Nikki Caceres MD        traZODone (DESYREL) half-tab 25 mg  25 mg Oral At Bedtime Rocío Lopez MD   25 mg at 12/18/24 2117    traZODone (DESYREL) half-tab 25 mg  25 mg Oral At Bedtime PRN Evelia Grimm DO   25 mg at 12/17/24 0332       PRN:  Current Facility-Administered Medications   Medication Dose Route Frequency Provider Last Rate Last Admin    acetaminophen (TYLENOL) tablet 650 mg  650 mg Oral Q4H PRN Nikki Caceres MD   650 mg at 12/19/24 0513    alum & mag hydroxide-simethicone (MAALOX) suspension 30 mL  30 mL Oral Q4H PRN Nikki Caceres MD   30 mL at 10/17/24 0837    amLODIPine (NORVASC) tablet 10 mg  10 mg Oral Daily Presley Barrera MD   10 mg at 12/19/24 0826    atorvastatin (LIPITOR) tablet 40 mg  40 mg Oral Daily Nikki Caceres MD   40 mg at 12/19/24 0826    cholecalciferol (VITAMIN D3) capsule 1,250 mcg  1,250 mcg Oral Q7 Days Evelia Grimm DO   1,250 mcg at 12/17/24 1234    cinacalcet (SENSIPAR) tablet 30 mg  30 mg Oral Daily Presley Barrera MD   30 mg at 12/19/24 0826    cloNIDine (CATAPRES) tablet 0.1 mg  0.1 mg Oral BID Presley Barrera MD   0.1 mg at 12/19/24 0826    furosemide (LASIX) tablet 40 mg  40 mg Oral Daily Presley Barrera MD   40 mg at 12/19/24 0826    gabapentin (NEURONTIN) capsule 100 mg  100 mg Oral Q6H PRN Nikki Caceres MD   100 mg at 11/15/24 0418    hydrocortisone (CORTAID) 0.5 % cream   Topical Daily PRN Savi Chamorro MD        lisinopril (ZESTRIL) tablet 40 mg  40 mg Oral Daily Presley Barrera MD   40 mg at 12/19/24 0825    melatonin tablet 3 mg  3 mg Oral At Bedtime Presley Barrera MD   3 mg at 12/18/24 2127    metoprolol succinate ER (TOPROL XL) 24 hr tablet 50 mg  50 mg Oral Daily Presley Barrera MD   50 mg at 12/18/24 0900    nicotine (NICODERM CQ) 14 MG/24HR 24 hr patch 1 patch  1 patch Transdermal Daily Evelia Grimm DO   1 patch at 12/19/24 0826    nicotine (NICORETTE) gum 2 mg  2 mg Buccal Q1H PRN  González Garcia MD   2 mg at 10/24/24 1254    OLANZapine zydis (zyPREXA) ODT tab 5 mg  5 mg Oral At Bedtime González Garcia MD   5 mg at 12/18/24 2117    Or    OLANZapine (zyPREXA) injection 10 mg  10 mg Intramuscular At Bedtime González Garcia MD        OLANZapine (zyPREXA) tablet 5 mg  5 mg Oral TID PRN Evelia Grimm DO   5 mg at 11/24/24 0436    Or    OLANZapine (zyPREXA) injection 5 mg  5 mg Intramuscular TID PRN Evelia Grimm,         polyethylene glycol (MIRALAX) Packet 17 g  17 g Oral Daily PRN Nikki Caceres MD        traZODone (DESYREL) half-tab 25 mg  25 mg Oral At Bedtime Rocío Lopez MD   25 mg at 12/18/24 2117    traZODone (DESYREL) half-tab 25 mg  25 mg Oral At Bedtime PRN Evelia Grimm DO   25 mg at 12/17/24 0332     Labs and Imaging:  Data this admission:  - CBC unremarkable  - CMP unremarkable  - TSH normal  - UDS negative  - Vit D low  - Hgb A1c 5.9 (10/13/24)  - Lipids unremarkable  - Vit B12 normal  - Folate normal  - Urinalysis unremarkable  - EKG normal sinus rhythm, QTc 390 ms  - Head CT showed no acute changes  - HIV non reactive  - Treponema antibody non reactive  - ESR wnl   - Ceruloplasmin wnl   - BOOGIE negative  - Lyme negative      Parathyroid hormone:   10/13: 85     Calcium:  10/14: 11.4  10/16: 10.3  10/17: 10.3  10/19: 9.8  10/21: 10.6  10/23: 10.3     Albumin:  10/14: 4.3  10/16: 4.3  10/17: 4.1  10/19: 4.2  10/21: 4.5  10/23: 4.3      CXR:   Impression:   1. No definite radiographic evidence of asbestosis. If clinically  indicated, consider evaluation with high resolution chest CT.  2. Nonspecific left costophrenic blunting. Given symmetrically low  lung volumes may be related to poor inspiratory effort/timing.  3. Pulmonary vascular congestion.     MRI:   IMPRESSION:  1. No acute intracranial pathology.  2. No focal lesion or structural abnormality.   3. Symmetric frontoparietal cortical volume loss slightly more than  expected for age.     Mental  "Status Exam:   Oriented to: Oriented to self and time only  General:  Awake and Alert  Appearance:  appears stated age, Grooming is adequate, and Dressed in his own clothes  Behavior/Attitude:  Disengaged, Easy to redirect, Easily distracted, and sleepy  Eye Contact: Appropriate for conversation  Psychomotor: No evidence of tics, dystonia, or tardive dyskinesia  no catatonia present  Speech:  appropriate volume/tone and slurred due to being sleepy  Language: Fluent in English with appropriate syntax and vocabulary.  Mood:  \"good\"  Affect:  confused  Thought Process:  tangential and confused  Thought Content:   did not assess SI/HI/ delusion of confusion . Patient denies paranoia  Associations:  loose  Insight:  impaired   Judgment:  impaired   Impulse control: limited  Attention Span:   mildly decreased  Concentration:   distractible  Recent and Remote Memory:   remote memory intact, recent memory impaired  Fund of Knowledge: average  Muscle Strength and Tone: normal  Gait and Station: Normal     Psychiatric Assessment     Estevan Aaron is a 64 year old male with previous psychiatric diagnoses of GEORGINA admitted from the ER on 10/12/2024 due to concern for HI and psychosis in the context of medical issues (hyperthyroidism, hypercalcemia), psychosocial stressors including with recent divorce and selling his home. This is the patient's first psychiatric hospitalization. Significant symptoms on admission included delusions of persecution with grandiose beliefs, homicidal ideations, as well as disorganized thinking and behavior. The MSE on admission was pertinent for a thought process which was perseverative, circumstantial, tangential, disorganized and tangential; with rambling and looseness of associations. Psychological contributions to presentation included lack of insight. Social factors contributing to presentation included isolation, recent divorce, selling his house, and moving from hotel to hotel. Biological " contributions to presentation included a history of hyperparathyroidism with chronic hypercalcemia per charts as well as a history of methamphetamine use per collateral from ex-wife.     History was difficult to obtain due to the patient's severe disorganized thinking on interview; he was observed with persecutory delusions pertaining to the realtor and gang members, disorganized behavior as these delusions have led him to flee to different hotels to stay safe/ has called  complaining of being targeted and auditory hallucinations of voices for the past 12 months. Per collateral from ex-wife, Santos has had paranoid ideations since they first met. He has always felt like people are out to get him or trying to rip him off. She says that he has had visual hallucinations (he will point things out that the rest of the family can't see) for a long time, but the family would just go along with him to avoid making him angry. This timeline and presentation could be consistent with diagnosis of paranoid personality disorder. Things began to worsen around the beginning of the COVID pandemic, when Santos became more isolated and the family started to notice cognitive issues such as impaired memory. Immediately prior to this hospitalization, the ex-wife found Santos in a hotel room with a generator full of gasoline, binoculars, and hunting equipment. This time course does not suggest acute psychosis, however given the patient has never had a full psychiatric work-up, we completed a first-episode psychosis work-up.      Other things that could be contributing to his presentation is hyperparathyroidism with hypercalcemia. However, patient's calcium returned to normal limits on 10/16, and patient continues to have psychosis so likely not a significant contributing factor to patient's presentation. Still we will assess with new labs tomorrow.     Patient also continues to be disoriented and his confused behavior tend to increase in the  evenings, this seems to be related to current Neurocognitive Disorder diagnosed, this could evolved to be his new baseline. Patient currently with no more improvement of bizarre behaviors with current neuroleptic dose, patient probably wont benefit from any modification in current therapy. Still patient did not present with any new episodes of physical agitation and is able to attend groups and interact with peers without major altercations.     Goal of treatment: pending placement to a memory care facility long term.         Psychiatric Plan by Diagnosis   Today's changes:  - none    # Major Neurocognitive Disorder  # Rule out paranoid personality disorder  1. Medications:  - Olanzapine 5 mg at bedtime  - Neurology consult 11/8/24, recommend outpatient follow-up and neuropsychiatric testing     2. Pertinent Labs/Monitoring:   - Re-eval Calcium parameters tomorrow     3. Additional Plans:  - Patient will be treated in therapeutic milieu with appropriate individual and group therapies as described  - Patient is Commitment with [facility]  - [facility] meds: Haldol, Clozaril, Risperdal, Invega, Zyprexa, Seroquel, Abilify  - 12/12/24: Psych team had phone call meeting with [facility] Care facility personnel  - [facility]  - MNChoice Assessment scheduled on 12/31 at 10:00am with Amanda Beard.       # Unspecified anxiety vs Generalized Anxiety Disorder  - Monitor for symptoms.  - Fluoxetine held due to suspicion of ongoing manic symptoms     Psychiatric Hospital Course:      Estevan Aaron was admitted to [facility] on a 72 hour hold.   Medications:  PTA fluoxetine was held due to concern for worsening of zelalem   New medications started at the time of admission include Zyprexa.   Increased olanzapine 10 mg at bedtime was to 10 mg BID (10/14)   Increased olanzapine 10 mg BID to 10 mg during day and 15 mg at bedtime (10/15)  10/21: increased olanzapine pm dose  from 15 to 20 mg  10/23: started melatonin 3 mg at 7 pm to help patient with circadian rhythm as he has been staying up throughout the night and sleeping a lot during the day and there is some concern for delirium   10/24: consulted anesthesia for MRI brain   10/28: MRI brain complete, decrease AM olanzapine from 10 to 5 mg  10/29: Morning olanzapine decreased to 2.5 mg, evening olanzapine decreased to 15 mg   11/1: Decreased evening olanzapine to 10 mg   11/4: Decreased evening olanzapine to 7.5 mg   11/5: Decreased evening olanzapine to 5 mg   11/11: Moved morning dose of olanzapine to the afternoon as patient appears more agitated in the afternoons/evenings  11/21: Started memantine 5 mg daily   11/26: Discontinued olanzapine 2.5 mg during the afternoon  12/2:   Discontinued memantine 5 mg, daily     Care conference 10/18/24 with daughter Maria C and ex-wife Clifford  - Report that patient has always been paranoid since ex-wife first met him. She describes him always feeling like he is getting ripped off.   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 They reported once incidence in which he made a bid on a job and the client paid him, but he never ended up finishing the job  - Wife and him  officially this year, and since selling the house several months ago, patient has been moving from hotel to hotel due to thinking people are out to get him   - When they were selling their house, patient threatened realtors and felt he was being ripped off   - Clifford reports that she started to be  afraid to be around him alone  - When he was found in the hotel, he had a generator full of gasoline, binoculars, bullets, and hunting equipment.     10/21/24: updated daughter on Santos's treatment plan      10/23/24: updated daughter on Santos's treatment plan      10/25/24: updated daughter on Santos's treatment plan, including plan to try and get MRI brain done under sedation. She said that Santos's grandfather had dementia and his sister has a brain tumor.      10/28/24: updated daughter on Santos's MRI results. She is planning on coming into town soon.      Care conference 11/1/24 with daughters Maria C and Estefani and ex-wife Clifford  - Discussed dementia diagnosis   - Clifford asked about plans moving forward. Explained that applying for medical assistance is the first step. Explained that application processing time can be very variable. Informed family that Santos will most likely be staying on this inpatient unit during this time.   - Clifford expressed that their family would like to be the power of /have conservatorship for Santos.   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 Reports that he has bills that he has not paid.   - Maria C is planning to move to [place], and Clifford currently lives in [place]. Family prefer that Santos would in a facility that are near these locations. Discussed with family that they can also try to request a specific location (memory care).   - Clifford reports that he had previously gotten help applying for his social security and medicare, but unsure if it had been approved.   - Informed family that there is a geriatric unit and there might be a transfer if there becomes availability on this unit.   - Family shared that he was completely different just two months ago.   - Estefani shared that his family found his medications in his old truck recently, expressed that it was likely that he was not taking his medications prior to his  hospitalization.      11/6/24: updated Maria C on plan to try and transfer Santos to Geriatric unit.      11/11/24: updated Maria C on neurology consult      The risks, benefits, alternatives, and side effects were discussed and understood by the patient and other caregivers.     Medical Assessment and Plan     Medical diagnoses to be addressed this admission:    # Hypertension  - Continue PTA medications  Furosemide 40 mg daily  Lisinopril 40 mg daily  Metoprolol 50 mg daily  Amlodipine 10 mg daily  Clonidine 0.1 mg BID     # Hyperlipidemia  - Continue PTA Atorvastatin 40 mg     # Primary Hyperparathyroidism  # Hypercalcemia, hypophosphoremia   Increased Ca level to 10.9 and decreased Ph level to 2.3 in the ED. Suspicion patient's hypercalcemia could be contributing to symptoms of psychosis.  - Consulted endocrinology, who started cinacalcet 30 mg BID on 10/13  - 10/16 endocrinology recommended continuing to trend calcium and albumin to make sure patient does not become hypocalcemic and recommended holding cinacalcet   - 10/17, calcium and albumin wnl, endo recommended cinacalcet 30 mg once daily   - 10/21, per endo continue cincaclcet and recheck calcium and albumin on October 24  - 10/23: per endo, patient needs to follow up outpatient for further management of hyperparathyroidism      Medical course: Patient was physically examined by the ED prior to being transferred to the unit and was found to be medically stable and appropriate for admission.      Consults: Psychiatry, Endocrinology (follow-up of hyperthyroidism / hypercalcemia and hypertension), neurology     Checklist     Legal Status: Committed   MI Commitment with [facility]  File Number: [id number]  Start and expiration date of commitment: 10/24/24 - 04/24/25     [facility] meds: Haldol, Clozaril, Risperdal, Invega, Zyprexa, Seroquel, Abilify     PPS/CM:  Shelby Chowdary:  [phone]  [email or web address].    Guardian/Conservator:  Clifford Aaron is currently emergency until 01/20/2025.       Safety Assessment:   Behavioral Orders   Procedures    Code 1 - Restrict to Unit    Routine Programming     As clinically indicated    Status 15     Every 15 minutes.    Status Individual Observation     SIO AT NIGHT     Patient SIO status reviewed with team/RN.  Please also refer to RN/team documentation for add'l detail.    -SIO staff to monitor following which have contributed to patient being on SIO:  Disruptive behavior at night     -When following observed, team will review discontinuation of SIO:  Less disruptive and redirectable at night     Order Specific Question:   CONTINUOUS 24 hours / day     Answer:   Other     Order Specific Question:   Specify distance     Answer:   10 ft     Order Specific Question:   Indications for SIO     Answer:   Severe intrusiveness       Risk Assessment:  Risk for harm is low  Risk factors: impulsive and past behaviors  Protective factors: family      Disposition: Pending stabilization, medication optimization, & development of a safe discharge plan.     Attestations     Resident without student: This patient was seen and discussed with my attending physician.    Rocío Orellana MD  [facility] Psychiatry Resident  12/19/2024

## 2024-12-19 NOTE — PLAN OF CARE
Team Note Due:  Monday    Assessment/Intervention/Current Symtoms and Care Coordination:  Chart review and met with team, discussed pt progress, symptomology, and response to treatment.  Discussed the discharge plan and any potential impediments to discharge. Clifford Aaron is currently emergency guardian/conservator until 01/20/2025.    Sent follow up to [facility] EP requesting any updates. Nikki states their nursing team is doing a pre-screen this afternoon, so there should be an update later today or tomorrow.    Requested update from Jessica Dean. Per Lorraine, they are not willing to reconsider the denial.    Received call from Anneliese at [facility] to discuss pt. She has reviewed RN notes that were sent earlier this week and is requesting a face sheet, any recent therapy notes, and MD notes. She stated they should be able to have staff come meet with pt this weekend in person but she will confirm with writer tomorrow. Agreed the goal would be to discharge by the end of the month if at all possible. Additional information was sent via fax. Sent update to family.    Discharge Plan or Goal:  Memory care facility     Barriers to Discharge:  Patient requires further psychiatric stabilization due to current symptomology, medication management with changes subject to provider, coordination with outside supports, and aftercare planning. Pt is under civil commitment.     Referral Status:    Memory Care facilities currently in process:  [facility]. Tour completed 11/27.  [facility] [place]. Tour completed 11/29.  [facility]. Tour completed 11/30.  [facility] [place]. Records sent 12/16/14.  Nikki Neol: [email or web address]; Office [phone], Fax [phone]    Memory Care facilities pending family review:  [facility].   [facility] [place] [facility].  [facility].  [facility].  [facility].  [facility].    Memory Care facilities declined:  [facility] (private pay only, no EW).  [facility] [address] (private pay only, no EW).  [facility] (no openings).  [facility]. Tour completed 11/29. Records sent 12/2/24. Declined 12/19/24 (don't feel they are an appropriate facility for pt's needs).  Lorraine (): [email or web address]; [phone]  Isatu (director of nursing): [email or web address]     Legal Status:  MI Commitment with [facility]  File Number: [id number]  Start and expiration date of commitment: 10/24/24 - 04/24/25    [facility] meds: Haldol, Clozaril, Risperdal, Invega, Zyprexa, Seroquel, Abilify    PPS/CM:  Shelby Chowdary: [phone]  [email or web address]    Contacts:  Maria C Aaron (Daughter): [phone]   Clifford Agnieszka (ex-wife): [phone]     No Del Angel (guardianship/conservatorship ): [phone]  [email or web address]     Upcoming Meetings and Dates/Important Information and next steps:  Follow up with family regarding list of Memory Care facilities  Discharge planning when appropriate  Pt does not qualify for MA per financial counselor on 11/8/2024. Pt will likely privately pay for Memory Care until he qualifies for MA/waivers in the future.  MNChoice Assessment scheduled on 12/31 at 10:00am with Amanda Beard.  Call scheduling team if pt is discharged or if this needs to be rescheduled: [phone]     Provisional Discharge and Change of Status needed at discharge

## 2024-12-20 ENCOUNTER — APPOINTMENT (OUTPATIENT)
Dept: ULTRASOUND IMAGING | Facility: CLINIC | Age: 64
End: 2024-12-20
Payer: COMMERCIAL

## 2024-12-20 LAB
ALBUMIN SERPL BCG-MCNC: 4.7 G/DL (ref 3.5–5.2)
ALP SERPL-CCNC: 134 U/L (ref 40–150)
ALT SERPL W P-5'-P-CCNC: 48 U/L (ref 0–70)
ANION GAP SERPL CALCULATED.3IONS-SCNC: 11 MMOL/L (ref 7–15)
AST SERPL W P-5'-P-CCNC: 32 U/L (ref 0–45)
BASOPHILS # BLD AUTO: 0.1 10E3/UL (ref 0–0.2)
BASOPHILS NFR BLD AUTO: 1 %
BILIRUB SERPL-MCNC: 0.6 MG/DL
BUN SERPL-MCNC: 19.9 MG/DL (ref 8–23)
CALCIUM SERPL-MCNC: 10.9 MG/DL (ref 8.8–10.4)
CHLORIDE SERPL-SCNC: 101 MMOL/L (ref 98–107)
CREAT SERPL-MCNC: 0.9 MG/DL (ref 0.67–1.17)
D DIMER PPP FEU-MCNC: 0.32 UG/ML FEU (ref 0–0.5)
EGFRCR SERPLBLD CKD-EPI 2021: >90 ML/MIN/1.73M2
EOSINOPHIL # BLD AUTO: 0.2 10E3/UL (ref 0–0.7)
EOSINOPHIL NFR BLD AUTO: 3 %
ERYTHROCYTE [DISTWIDTH] IN BLOOD BY AUTOMATED COUNT: 18.7 % (ref 10–15)
GLUCOSE SERPL-MCNC: 117 MG/DL (ref 70–99)
HCO3 SERPL-SCNC: 29 MMOL/L (ref 22–29)
HCT VFR BLD AUTO: 42.5 % (ref 40–53)
HGB BLD-MCNC: 13.2 G/DL (ref 13.3–17.7)
IMM GRANULOCYTES # BLD: 0.1 10E3/UL
IMM GRANULOCYTES NFR BLD: 1 %
LYMPHOCYTES # BLD AUTO: 2 10E3/UL (ref 0.8–5.3)
LYMPHOCYTES NFR BLD AUTO: 25 %
MCH RBC QN AUTO: 19.4 PG (ref 26.5–33)
MCHC RBC AUTO-ENTMCNC: 31.1 G/DL (ref 31.5–36.5)
MCV RBC AUTO: 62 FL (ref 78–100)
MONOCYTES # BLD AUTO: 0.6 10E3/UL (ref 0–1.3)
MONOCYTES NFR BLD AUTO: 8 %
NEUTROPHILS # BLD AUTO: 4.8 10E3/UL (ref 1.6–8.3)
NEUTROPHILS NFR BLD AUTO: 61 %
NRBC # BLD AUTO: 0 10E3/UL
NRBC BLD AUTO-RTO: 0 /100
PLATELET # BLD AUTO: 288 10E3/UL (ref 150–450)
POTASSIUM SERPL-SCNC: 4.2 MMOL/L (ref 3.4–5.3)
PROT SERPL-MCNC: 7.5 G/DL (ref 6.4–8.3)
RBC # BLD AUTO: 6.81 10E6/UL (ref 4.4–5.9)
SODIUM SERPL-SCNC: 141 MMOL/L (ref 135–145)
WBC # BLD AUTO: 7.8 10E3/UL (ref 4–11)

## 2024-12-20 PROCEDURE — 99232 SBSQ HOSP IP/OBS MODERATE 35: CPT | Mod: GC | Performed by: STUDENT IN AN ORGANIZED HEALTH CARE EDUCATION/TRAINING PROGRAM

## 2024-12-20 PROCEDURE — 250N000013 HC RX MED GY IP 250 OP 250 PS 637

## 2024-12-20 PROCEDURE — 82040 ASSAY OF SERUM ALBUMIN: CPT

## 2024-12-20 PROCEDURE — 85041 AUTOMATED RBC COUNT: CPT

## 2024-12-20 PROCEDURE — 85379 FIBRIN DEGRADATION QUANT: CPT

## 2024-12-20 PROCEDURE — 93970 EXTREMITY STUDY: CPT

## 2024-12-20 PROCEDURE — 93970 EXTREMITY STUDY: CPT | Mod: 26 | Performed by: RADIOLOGY

## 2024-12-20 PROCEDURE — 124N000002 HC R&B MH UMMC

## 2024-12-20 PROCEDURE — 85004 AUTOMATED DIFF WBC COUNT: CPT

## 2024-12-20 PROCEDURE — 36415 COLL VENOUS BLD VENIPUNCTURE: CPT

## 2024-12-20 RX ADMIN — ACETAMINOPHEN 650 MG: 325 TABLET, FILM COATED ORAL at 05:08

## 2024-12-20 RX ADMIN — Medication 25 MG: at 21:00

## 2024-12-20 RX ADMIN — LIDOCAINE 4% 1 PATCH: 40 PATCH TOPICAL at 16:55

## 2024-12-20 RX ADMIN — LISINOPRIL 40 MG: 10 TABLET ORAL at 08:28

## 2024-12-20 RX ADMIN — AMLODIPINE BESYLATE 10 MG: 5 TABLET ORAL at 08:28

## 2024-12-20 RX ADMIN — Medication 1 PATCH: at 08:37

## 2024-12-20 RX ADMIN — CINACALCET 30 MG: 30 TABLET ORAL at 08:28

## 2024-12-20 RX ADMIN — MELATONIN TAB 3 MG 3 MG: 3 TAB at 20:10

## 2024-12-20 RX ADMIN — METOPROLOL SUCCINATE 50 MG: 50 TABLET, EXTENDED RELEASE ORAL at 08:28

## 2024-12-20 RX ADMIN — ATORVASTATIN CALCIUM 40 MG: 20 TABLET, FILM COATED ORAL at 08:28

## 2024-12-20 RX ADMIN — DICLOFENAC SODIUM TOPICAL GEL, 1% 2 G: 10 GEL TOPICAL at 05:08

## 2024-12-20 RX ADMIN — ACETAMINOPHEN 650 MG: 325 TABLET, FILM COATED ORAL at 14:28

## 2024-12-20 RX ADMIN — OLANZAPINE 5 MG: 5 TABLET, ORALLY DISINTEGRATING ORAL at 20:11

## 2024-12-20 RX ADMIN — CLONIDINE HYDROCHLORIDE 0.1 MG: 0.1 TABLET ORAL at 20:11

## 2024-12-20 RX ADMIN — FUROSEMIDE 40 MG: 40 TABLET ORAL at 08:28

## 2024-12-20 RX ADMIN — CLONIDINE HYDROCHLORIDE 0.1 MG: 0.1 TABLET ORAL at 08:28

## 2024-12-20 ASSESSMENT — ACTIVITIES OF DAILY LIVING (ADL)
ADLS_ACUITY_SCORE: 54
HYGIENE/GROOMING: INDEPENDENT
DRESS: INDEPENDENT
ORAL_HYGIENE: INDEPENDENT
ADLS_ACUITY_SCORE: 54
ORAL_HYGIENE: INDEPENDENT
ADLS_ACUITY_SCORE: 54
LAUNDRY: UNABLE TO COMPLETE
ADLS_ACUITY_SCORE: 54
ADLS_ACUITY_SCORE: 66
ADLS_ACUITY_SCORE: 54
ADLS_ACUITY_SCORE: 66
HYGIENE/GROOMING: INDEPENDENT
ADLS_ACUITY_SCORE: 66
ADLS_ACUITY_SCORE: 54
ADLS_ACUITY_SCORE: 66
ADLS_ACUITY_SCORE: 54
DRESS: INDEPENDENT;STREET CLOTHES
ADLS_ACUITY_SCORE: 54

## 2024-12-20 NOTE — PLAN OF CARE
"Pt reported right hip pain this am pt had rec'd PRN tyelnol 650mg at 0508. Pt had couple of warm packs on his hip.  Pt irritated on how the warm pack are designed and he stated that a  would never design them this way.  Pt pt continues to have LE edema.  MD informed.  Pt had labs drawn and completed a U/S of bilateral lower extremities. Pt has a medicine consult pending for BP, right hip pain, and his edema.  Pt at times confused.  Pt walking down [address] and stated he was going to his garage.  Pt at time seems loss for words.  Pt been eating well. No bs issues.      Problem: Adult Behavioral Health Plan of Care  Goal: Plan of Care Review  12/20/2024 1524 by Catherine Szymanski RN  Outcome: Not Progressing  12/20/2024 1504 by Catherine Szymanski RN  Outcome: Not Progressing  12/20/2024 1454 by Catherine Szymanski RN  Outcome: Not Progressing  Goal: Patient-Specific Goal (Individualization)  Description: You can add care plan individualizations to a care plan. Examples of Individualization might be:  \"Parent requests to be called daily at 9am for status\", \"I have a hard time hearing out of my right ear\", or \"Do not touch me to wake me up as it startles  me\".  12/20/2024 1524 by Catherine Szymanski RN  Outcome: Not Progressing  12/20/2024 1504 by Catherine Szymanski RN  Outcome: Not Progressing  12/20/2024 1454 by Catherine Szymanski RN  Outcome: Not Progressing  Goal: Adheres to Safety Considerations for Self and Others  12/20/2024 1524 by Catherine Szymanski RN  Outcome: Not Progressing  12/20/2024 1504 by Catherine Szymanski RN  Outcome: Not Progressing  12/20/2024 1454 by Catherine Szymanski RN  Outcome: Not Progressing  Intervention: Develop and Maintain Individualized Safety Plan  Recent Flowsheet Documentation  Taken 12/20/2024 1400 by Catherine Szymanski RN  Safety Measures: environmental rounds completed  Intervention: Develop and Maintain Individualized Safety Plan  Recent Flowsheet Documentation  Taken 12/20/2024 1400 by Catherine Szymanski RN  Safety " Measures: environmental rounds completed  Goal: Absence of New-Onset Illness or Injury  12/20/2024 1524 by Catherine Szymanski RN  Outcome: Not Progressing  12/20/2024 1504 by Catherine Szymanski RN  Outcome: Not Progressing  12/20/2024 1454 by Catherine Szymanski RN  Outcome: Not Progressing  Intervention: Identify and Manage Fall Risk  Recent Flowsheet Documentation  Taken 12/20/2024 1400 by Catherine Szymanski RN  Safety Measures: environmental rounds completed  Goal: Optimized Coping Skills in Response to Life Stressors  12/20/2024 1524 by Catherine Szymanski RN  Outcome: Not Progressing  12/20/2024 1504 by Catherine Szymanski RN  Outcome: Not Progressing  12/20/2024 1454 by Catherine Szymanski RN  Outcome: Not Progressing  Goal: Develops/Participates in Therapeutic [place] to Support Successful Transition  12/20/2024 1524 by Catherine Szymanski RN  Outcome: Not Progressing  12/20/2024 1504 by Catherine Szymanski RN  Outcome: Not Progressing  12/20/2024 1454 by Catherine Szymanski RN  Outcome: Not Progressing     Problem: Psychotic Signs/Symptoms  Goal: Improved Behavioral Control (Psychotic Signs/Symptoms)  12/20/2024 1524 by Catherine Szymanski RN  Outcome: Not Progressing  12/20/2024 1504 by Catherine Szymanski RN  Outcome: Not Progressing  12/20/2024 1454 by Catherine Szymanski RN  Outcome: Not Progressing  Goal: Optimal Cognitive Function (Psychotic Signs/Symptoms)  12/20/2024 1524 by Catherine Szymanski RN  Outcome: Not Progressing  12/20/2024 1504 by Catherine Szymanski RN  Outcome: Not Progressing  12/20/2024 1454 by Catherine Szymanski RN  Outcome: Not Progressing  Goal: Increased Participation and Engagement (Psychotic Signs/Symptoms)  12/20/2024 1524 by Catherine Szymanski RN  Outcome: Not Progressing  12/20/2024 1504 by Catherine Szymanski RN  Outcome: Not Progressing  12/20/2024 1454 by Catherine Szymanski RN  Outcome: Not Progressing  Goal: Improved Mood Symptoms (Psychotic Signs/Symptoms)  12/20/2024 1524 by Catherine Szymanski RN  Outcome: Not Progressing  12/20/2024 1504 by Catherine Szymanski  RN  Outcome: Not Progressing  12/20/2024 1454 by Catherine Szymanski RN  Outcome: Not Progressing  Goal: Improved Psychomotor Symptoms (Psychotic Signs/Symptoms)  12/20/2024 1524 by Catherine Szymanski RN  Outcome: Not Progressing  12/20/2024 1504 by Catherine Szymanski RN  Outcome: Not Progressing  12/20/2024 1454 by Catherine Szymanski RN  Outcome: Not Progressing  Intervention: Manage Psychomotor Movement  Recent Flowsheet Documentation  Taken 12/20/2024 1400 by Catherine Szymanski RN  Activity (Behavioral Health):   up ad diya   activity encouraged  Goal: Decreased Sensory Symptoms (Psychotic Signs/Symptoms)  12/20/2024 1524 by Catherine Szymanski RN  Outcome: Not Progressing  12/20/2024 1504 by Catherine Szymanski RN  Outcome: Not Progressing  12/20/2024 1454 by Catherine Szymanski RN  Outcome: Not Progressing  Goal: Improved Sleep (Psychotic Signs/Symptoms)  12/20/2024 1524 by Catherine Szymanski RN  Outcome: Not Progressing  12/20/2024 1504 by Catherine Szymanski RN  Outcome: Not Progressing  12/20/2024 1454 by Catherine Szymanski RN  Outcome: Not Progressing  Goal: Enhanced Social, Occupational or Functional Skills (Psychotic Signs/Symptoms)  12/20/2024 1524 by Catherine Szymanski RN  Outcome: Not Progressing  12/20/2024 1504 by Catherine Szymanski RN  Outcome: Not Progressing  12/20/2024 1454 by Catherine Szymanski RN  Outcome: Not Progressing     Problem: Depression  Goal: Improved Mood  12/20/2024 1524 by Catherine Szymanski RN  Outcome: Not Progressing  12/20/2024 1504 by Catherine Szymanski RN  Outcome: Not Progressing  12/20/2024 1454 by Catherine Szymanski RN  Outcome: Not Progressing     Problem: Suicidal Behavior  Goal: Suicidal Behavior is Absent or Managed  12/20/2024 1524 by Catherine Szymanski RN  Outcome: Not Progressing  12/20/2024 1504 by Catherine Szymanski RN  Outcome: Not Progressing  12/20/2024 1454 by Catherine Szymanski RN  Outcome: Not Progressing     Problem: Anxiety  Goal: Anxiety Reduction or Resolution  12/20/2024 1524 by Catherine Szymanski RN  Outcome: Not Progressing  12/20/2024  1504 by Catherine Szymanski RN  Outcome: Not Progressing  12/20/2024 1454 by Catherine Szymanski RN  Outcome: Not Progressing     Problem: Sleep Disturbance  Goal: Adequate Sleep/Rest  12/20/2024 1524 by Catherine Szymanski RN  Outcome: Not Progressing  12/20/2024 1504 by Catherine Szymanski RN  Outcome: Not Progressing  12/20/2024 1454 by Catherine Szymanski RN  Outcome: Not Progressing     Problem: Seclusion/Restraint, Behavioral  Goal: Seclusion/Behavioral Restraint Goal: Absence of Harm or Injury  12/20/2024 1524 by Catherine Szymanski RN  Outcome: Not Progressing  12/20/2024 1504 by Catherine Szymanski RN  Outcome: Not Progressing  12/20/2024 1454 by Catherine Szymanski RN  Outcome: Not Progressing  Intervention: Implement Least Restrictive Safety Strategies  Recent Flowsheet Documentation  Taken 12/20/2024 1400 by Catherine Szymanski RN  Safety Measures: environmental rounds completed     Problem: Pain Acute  Goal: Optimal Pain Control and Function  12/20/2024 1524 by Catherine Szymanski RN  Outcome: Not Progressing  12/20/2024 1504 by Catherine Szymanski RN  Outcome: Not Progressing  12/20/2024 1454 by Catherine Szymanski RN  Outcome: Not Progressing     Problem: Manic or Hypomanic Signs/Symptoms  Goal: Improved Impulse Control (Manic/Hypomanic Signs/Symptoms)  12/20/2024 1524 by Catherine Szymanski RN  Outcome: Not Progressing  12/20/2024 1504 by Catherine Szymanski RN  Outcome: Not Progressing  12/20/2024 1454 by Catherine Szymanski RN  Outcome: Not Progressing  Goal: Optimized Cognitive Function (Manic/Hypomanic Signs/Symptoms)  12/20/2024 1524 by Ctaherine Szymanski RN  Outcome: Not Progressing  12/20/2024 1504 by Catherine Szymanski RN  Outcome: Not Progressing  12/20/2024 1454 by Catherine Szymanski RN  Outcome: Not Progressing  Goal: Improved Mood Symptoms (Manic/Hypomanic Signs/Symptoms)  12/20/2024 1524 by Catherine Szymanski RN  Outcome: Not Progressing  12/20/2024 1504 by Catherine Szymanski RN  Outcome: Not Progressing  12/20/2024 1454 by Catherine Szymanski RN  Outcome: Not Progressing  Goal:  Optimized Nutrition Intake (Manic or Hypomanic Signs/Symptoms)  12/20/2024 1524 by Catherine Szymanski RN  Outcome: Not Progressing  12/20/2024 1504 by Catherine Szymanski RN  Outcome: Not Progressing  12/20/2024 1454 by Catherine Szymanski RN  Outcome: Not Progressing  Goal: Improved Psychomotor Symptoms (Manic/Hypomanic Signs/Symptoms)  12/20/2024 1524 by Catherine Szymanski RN  Outcome: Not Progressing  12/20/2024 1504 by Catherine Szymanski RN  Outcome: Not Progressing  12/20/2024 1454 by Catherine Szymanski RN  Outcome: Not Progressing  Intervention: Manage Psychomotor Movement  Recent Flowsheet Documentation  Taken 12/20/2024 1400 by Catherine Szymanski RN  Activity (Behavioral Health):   up ad diya   activity encouraged  Goal: Improved Sleep (Manic/Hypomanic Signs/Symptoms)  12/20/2024 1524 by Catherine Szymanski RN  Outcome: Not Progressing  12/20/2024 1504 by Catherine Szymanski RN  Outcome: Not Progressing  12/20/2024 1454 by Catherine Szymanski RN  Outcome: Not Progressing  Goal: Enhanced Social, Occupational or Functional Skills (Manic/Hypomanic Signs/Symptoms)  12/20/2024 1524 by Catherine Szymanski RN  Outcome: Not Progressing  12/20/2024 1504 by Catherine Szymanski RN  Outcome: Not Progressing  12/20/2024 1454 by Catherine Szymanski RN  Outcome: Not Progressing   Goal Outcome Evaluation:

## 2024-12-20 NOTE — PROGRESS NOTES
----------------------------------------------------------------------------------------------------------  [facility]  Psychiatry Progress Note  Hospital Day #69     Interim History:     The patient's care was discussed with the treatment team and chart notes were reviewed.    Identifier: Estevan Aaron is a 64 year old male with previous psychiatric diagnoses of  generalized anxiety disorder, Neurocognitive disorder, admitted from the ED 10/12/2024 due to concern for HI and psychosis in the context of medical issues (hyperthyroidism, hypercalcemia) psychosocial stressors including family dynamics with recent possible divorce.    Vitals: VSS  Sleep: 3 hours (12/20/24 0600)  Scheduled medications: Took all scheduled medications as prescribed  Psychiatric PRN medications:   Last 24H PRN:     acetaminophen (TYLENOL) tablet 650 mg, 650 mg at 12/20/24 0508    diclofenac (VOLTAREN) 1 % topical gel 2 g, 2 g at 12/20/24 0508    Lidocaine (LIDOCARE) 4 % Patch 1 patch, 1 patch at 12/19/24 1722      Staff Report:   Santos was visible in the milieu in the lounge watching tv most of the time with flat affect. He is intermittently confused. Denies for anxiety, depression, suicidal thoughts or hallucinations. Pt c/o pain on right hip and bilateral knees. Lidocaine patch and Voltaren gel ordered and administered. Patient complained of R foot pain, R knee and R hip pain-Tylenol, Diclofenac gel given. Ice pack on the R knee and warm pack on the R hip applied. Patient's bilateral hands and B eyelids are swollen, edema +3 on B feet, B legs and B ankle. Sticky note left to MD. No complaints of chest pain, SOB, lightheadedness, or any N/V. Later he declined Lidocaine patch on the R hip to be removed.     See staff notes for more information     Subjective:     Patient Interview:  Santos is sitting on the millieu. He mentions he feels good, but sleepy, says he is going to have lab work, but says  "he will not do it. He says he just had pad taken off, and says his hip has been hurting. Asks about drawing blood. Says there is pain in the back of his butt, says he will have a patch tonight. Says he will not draw blood, says \"it breaks your body down,\" says he could get an infection.     ROS:  See above     Objective:     Vitals:  BP (!) 154/81 (BP Location: Right arm)   Pulse 68   Temp 97.7  F (36.5  C) (Temporal)   Resp 18   Wt 110.6 kg (243 lb 12.8 oz)   SpO2 97%   BMI 39.35 kg/m      Allergies:  No Known Allergies    Current Medications:  Scheduled:  Current Facility-Administered Medications   Medication Dose Route Frequency Provider Last Rate Last Admin    acetaminophen (TYLENOL) tablet 650 mg  650 mg Oral Q4H PRN Nikki Caceres MD   650 mg at 12/20/24 0508    alum & mag hydroxide-simethicone (MAALOX) suspension 30 mL  30 mL Oral Q4H PRN Nikki Caceres MD   30 mL at 10/17/24 0837    amLODIPine (NORVASC) tablet 10 mg  10 mg Oral Daily Presley Barrera MD   10 mg at 12/19/24 0826    atorvastatin (LIPITOR) tablet 40 mg  40 mg Oral Daily Nikki Caceres MD   40 mg at 12/19/24 0826    cholecalciferol (VITAMIN D3) capsule 1,250 mcg  1,250 mcg Oral Q7 Days Sirisha EveliaDO   1,250 mcg at 12/17/24 1234    cinacalcet (SENSIPAR) tablet 30 mg  30 mg Oral Daily Presley Barrera MD   30 mg at 12/19/24 0826    cloNIDine (CATAPRES) tablet 0.1 mg  0.1 mg Oral BID Presley Barrera MD   0.1 mg at 12/19/24 2126    diclofenac (VOLTAREN) 1 % topical gel 2 g  2 g Topical 4x Daily PRN Rocío Lopez MD   2 g at 12/20/24 0508    furosemide (LASIX) tablet 40 mg  40 mg Oral Daily Presley Barrera MD   40 mg at 12/19/24 0826    gabapentin (NEURONTIN) capsule 100 mg  100 mg Oral Q6H PRN Nikki Caceres MD   100 mg at 11/15/24 0418    hydrocortisone (CORTAID) 0.5 % cream   Topical Daily PRN Savi Chamorro MD        Lidocaine (LIDOCARE) 4 % Patch 1 patch  1 patch Transdermal Q24H PRN Salazar " Rocío Juarez MD   1 patch at 12/19/24 1722    lisinopril (ZESTRIL) tablet 40 mg  40 mg Oral Daily Presley Barrera MD   40 mg at 12/19/24 0825    melatonin tablet 3 mg  3 mg Oral At Bedtime Presley Barrera MD   3 mg at 12/19/24 2126    metoprolol succinate ER (TOPROL XL) 24 hr tablet 50 mg  50 mg Oral Daily Presley Barrera MD   50 mg at 12/18/24 0900    nicotine (NICODERM CQ) 14 MG/24HR 24 hr patch 1 patch  1 patch Transdermal Daily Evelia Grimm DO   1 patch at 12/19/24 0826    nicotine (NICORETTE) gum 2 mg  2 mg Buccal Q1H PRN González Garcia MD   2 mg at 10/24/24 1254    OLANZapine zydis (zyPREXA) ODT tab 5 mg  5 mg Oral At Bedtime González Garcia MD   5 mg at 12/19/24 2126    Or    OLANZapine (zyPREXA) injection 10 mg  10 mg Intramuscular At Bedtime González Garcia MD        OLANZapine (zyPREXA) tablet 5 mg  5 mg Oral TID PRN Evelia Grimm DO   5 mg at 11/24/24 0436    Or    OLANZapine (zyPREXA) injection 5 mg  5 mg Intramuscular TID PRN Evelia Grimm DO        polyethylene glycol (MIRALAX) Packet 17 g  17 g Oral Daily PRN Nikki Caceres MD        traZODone (DESYREL) half-tab 25 mg  25 mg Oral At Bedtime Rocío Lopez MD   25 mg at 12/19/24 2126    traZODone (DESYREL) half-tab 25 mg  25 mg Oral At Bedtime PRN Evelia Grimm DO   25 mg at 12/17/24 0332       PRN:  Current Facility-Administered Medications   Medication Dose Route Frequency Provider Last Rate Last Admin    acetaminophen (TYLENOL) tablet 650 mg  650 mg Oral Q4H PRN Nikki Caceres MD   650 mg at 12/20/24 0508    alum & mag hydroxide-simethicone (MAALOX) suspension 30 mL  30 mL Oral Q4H PRN Nikki Caceres MD   30 mL at 10/17/24 0837    amLODIPine (NORVASC) tablet 10 mg  10 mg Oral Daily Presley Barrera MD   10 mg at 12/19/24 0826    atorvastatin (LIPITOR) tablet 40 mg  40 mg Oral Daily Nikki Caceres MD   40 mg at 12/19/24 0826    cholecalciferol (VITAMIN D3) capsule 1,250 mcg  1,250 mcg Oral Q7  Days Evelia Grimm DO   1,250 mcg at 12/17/24 1234    cinacalcet (SENSIPAR) tablet 30 mg  30 mg Oral Daily Presley Barrera MD   30 mg at 12/19/24 0826    cloNIDine (CATAPRES) tablet 0.1 mg  0.1 mg Oral BID Presley Barrera MD   0.1 mg at 12/19/24 2126    diclofenac (VOLTAREN) 1 % topical gel 2 g  2 g Topical 4x Daily PRN Rocío Lopez MD   2 g at 12/20/24 0508    furosemide (LASIX) tablet 40 mg  40 mg Oral Daily Presley Barrera MD   40 mg at 12/19/24 0826    gabapentin (NEURONTIN) capsule 100 mg  100 mg Oral Q6H PRN Nikki Caceres MD   100 mg at 11/15/24 0418    hydrocortisone (CORTAID) 0.5 % cream   Topical Daily PRN Savi Chamorro MD        Lidocaine (LIDOCARE) 4 % Patch 1 patch  1 patch Transdermal Q24H PRN Rocío Lopez MD   1 patch at 12/19/24 1722    lisinopril (ZESTRIL) tablet 40 mg  40 mg Oral Daily Presley Barrera MD   40 mg at 12/19/24 0825    melatonin tablet 3 mg  3 mg Oral At Bedtime Presley Barrera MD   3 mg at 12/19/24 2126    metoprolol succinate ER (TOPROL XL) 24 hr tablet 50 mg  50 mg Oral Daily Presley Barrera MD   50 mg at 12/18/24 0900    nicotine (NICODERM CQ) 14 MG/24HR 24 hr patch 1 patch  1 patch Transdermal Daily Evelia Grimm DO   1 patch at 12/19/24 0826    nicotine (NICORETTE) gum 2 mg  2 mg Buccal Q1H PRN González Garcia MD   2 mg at 10/24/24 1254    OLANZapine zydis (zyPREXA) ODT tab 5 mg  5 mg Oral At Bedtime González Garcia MD   5 mg at 12/19/24 2126    Or    OLANZapine (zyPREXA) injection 10 mg  10 mg Intramuscular At Bedtime González Garcia MD        OLANZapine (zyPREXA) tablet 5 mg  5 mg Oral TID PRN Evelia Grimm DO   5 mg at 11/24/24 0436    Or    OLANZapine (zyPREXA) injection 5 mg  5 mg Intramuscular TID PRN Evelia Grimm DO        polyethylene glycol (MIRALAX) Packet 17 g  17 g Oral Daily PRN Nikki Caceres MD        traZODone (DESYREL) half-tab 25 mg  25 mg Oral At Bedtime Rocío Lopez MD    "25 mg at 12/19/24 2126    traZODone (DESYREL) half-tab 25 mg  25 mg Oral At Bedtime PRN Evelia Grimm DO   25 mg at 12/17/24 0332     Labs and Imaging:  Data this admission:  - CBC unremarkable  - CMP unremarkable  - TSH normal  - UDS negative  - Vit D low  - Hgb A1c 5.9 (10/13/24)  - Lipids unremarkable  - Vit B12 normal  - Folate normal  - Urinalysis unremarkable  - EKG normal sinus rhythm, QTc 390 ms  - Head CT showed no acute changes  - HIV non reactive  - Treponema antibody non reactive  - ESR wnl   - Ceruloplasmin wnl   - BOOGIE negative  - Lyme negative      Parathyroid hormone:   10/13: 85     Calcium:  10/14: 11.4  10/16: 10.3  10/17: 10.3  10/19: 9.8  10/21: 10.6  10/23: 10.3     Albumin:  10/14: 4.3  10/16: 4.3  10/17: 4.1  10/19: 4.2  10/21: 4.5  10/23: 4.3      CXR:   Impression:   1. No definite radiographic evidence of asbestosis. If clinically  indicated, consider evaluation with high resolution chest CT.  2. Nonspecific left costophrenic blunting. Given symmetrically low  lung volumes may be related to poor inspiratory effort/timing.  3. Pulmonary vascular congestion.     MRI:   IMPRESSION:  1. No acute intracranial pathology.  2. No focal lesion or structural abnormality.   3. Symmetric frontoparietal cortical volume loss slightly more than  expected for age.     Mental Status Exam:   Oriented to: Oriented to self and time only  General:  Awake and Alert  Appearance:  appears stated age, Grooming is adequate, and Dressed in his own clothes  Behavior/Attitude:  Disengaged, Easy to redirect, Easily distracted, and sleepy  Eye Contact: Appropriate for conversation  Psychomotor: No evidence of tics, dystonia, or tardive dyskinesia  no catatonia present  Speech:  appropriate volume/tone and slurred due to being sleepy  Language: Fluent in English with appropriate syntax and vocabulary.  Mood:  \"have pain in my hip\"  Affect:  confused  Thought Process:  tangential and confused  Thought Content:   did not " assess SI/HI/ delusion of confusion . Patient denies paranoia  Associations:  loose  Insight:  impaired   Judgment:  impaired   Impulse control: limited  Attention Span:   mildly decreased  Concentration:   distractible  Recent and Remote Memory:   remote memory intact, recent memory impaired  Fund of Knowledge: average  Muscle Strength and Tone: normal  Gait and Station: Normal     Psychiatric Assessment     Estevan Aaron is a 64 year old male with previous psychiatric diagnoses of GEORGINA admitted from the ER on 10/12/2024 due to concern for HI and psychosis in the context of medical issues (hyperthyroidism, hypercalcemia), psychosocial stressors including with recent divorce and selling his home. This is the patient's first psychiatric hospitalization. Significant symptoms on admission included delusions of persecution with grandiose beliefs, homicidal ideations, as well as disorganized thinking and behavior. The MSE on admission was pertinent for a thought process which was perseverative, circumstantial, tangential, disorganized and tangential; with rambling and looseness of associations. Psychological contributions to presentation included lack of insight. Social factors contributing to presentation included isolation, recent divorce, selling his house, and moving from hotel to hotel. Biological contributions to presentation included a history of hyperparathyroidism with chronic hypercalcemia per charts as well as a history of methamphetamine use per collateral from ex-wife.     History was difficult to obtain due to the patient's severe disorganized thinking on interview; he was observed with persecutory delusions pertaining to the realtor and gang members, disorganized behavior as these delusions have led him to flee to different hotels to stay safe/ has called  complaining of being targeted and auditory hallucinations of voices for the past 12 months. Per collateral from ex-wife, Santos has had paranoid  ideations since they first met. He has always felt like people are out to get him or trying to rip him off. She says that he has had visual hallucinations (he will point things out that the rest of the family can't see) for a long time, but the family would just go along with him to avoid making him angry. This timeline and presentation could be consistent with diagnosis of paranoid personality disorder. Things began to worsen around the beginning of the COVID pandemic, when Santos became more isolated and the family started to notice cognitive issues such as impaired memory. Immediately prior to this hospitalization, the ex-wife found Santos in a hotel room with a generator full of gasoline, binoculars, and hunting equipment. This time course does not suggest acute psychosis, however given the patient has never had a full psychiatric work-up, we completed a first-episode psychosis work-up.      Other things that could be contributing to his presentation is hyperparathyroidism with hypercalcemia. However, patient's calcium returned to normal limits on 10/16, and patient continues to have psychosis so likely not a significant contributing factor to patient's presentation. Still we will assess with new labs tomorrow.     Patient also continues to be disoriented and his confused behavior tend to increase in the evenings, this seems to be related to current Neurocognitive Disorder diagnosed, this could evolved to be his new baseline. Patient currently with no more improvement of bizarre behaviors with current neuroleptic dose, patient probably wont benefit from any modification in current therapy. Still patient did not present with any new episodes of physical agitation and is able to attend groups and interact with peers without major altercations.     Goal of treatment: pending placement to a memory care facility long term.         Psychiatric Plan by Diagnosis   Today's changes:  - none    # Major Neurocognitive  Disorder  # Rule out paranoid personality disorder  1. Medications:  - Olanzapine 5 mg at bedtime  - Neurology consult 11/8/24, recommend outpatient follow-up and neuropsychiatric testing     2. Pertinent Labs/Monitoring:   - Re-eval Calcium parameters tomorrow     3. Additional Plans:  - Patient will be treated in therapeutic milieu with appropriate individual and group therapies as described  - Patient is Commitment with [facility]  - [facility] meds: Haldol, Clozaril, Risperdal, Invega, Zyprexa, Seroquel, Abilify  - 12/12/24: Psych team had phone call meeting with Memory Care facility personnel  - [facility]  - MNChoice Assessment scheduled on 12/31 at 10:00am with Amanda Beard.       # Unspecified anxiety vs Generalized Anxiety Disorder  - Monitor for symptoms.  - Fluoxetine held due to suspicion of ongoing manic symptoms     Psychiatric Hospital Course:      Estevan Aaron was admitted to [facility] on a 72 hour hold.   Medications:  PTA fluoxetine was held due to concern for worsening of zelalem   New medications started at the time of admission include Zyprexa.   Increased olanzapine 10 mg at bedtime was to 10 mg BID (10/14)   Increased olanzapine 10 mg BID to 10 mg during day and 15 mg at bedtime (10/15)  10/21: increased olanzapine pm dose from 15 to 20 mg  10/23: started melatonin 3 mg at 7 pm to help patient with circadian rhythm as he has been staying up throughout the night and sleeping a lot during the day and there is some concern for delirium   10/24: consulted anesthesia for MRI brain   10/28: MRI brain complete, decrease AM olanzapine from 10 to 5 mg  10/29: Morning olanzapine decreased to 2.5 mg, evening olanzapine decreased to 15 mg   11/1: Decreased evening olanzapine to 10 mg   11/4: Decreased evening olanzapine to 7.5 mg   11/5: Decreased evening olanzapine to 5 mg   11/11: Moved morning dose of olanzapine to the afternoon as  patient appears more agitated in the afternoons/evenings  11/21: Started memantine 5 mg daily   11/26: Discontinued olanzapine 2.5 mg during the afternoon  12/2:   Discontinued memantine 5 mg, daily     Care conference 10/18/24 with daughter Maria C and ex-wife Clifford  - Report that patient has always been paranoid since ex-wife first met him. She describes him always feeling like he is getting ripped off.   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 They reported once incidence in which he made a bid on a job and the client paid him, but he never ended up finishing the job  - Wife and him  officially this year, and since selling the house several months ago, patient has been moving from hotel to hotel due to thinking people are out to get him   - When they were selling their house, patient threatened realtors and felt he was being ripped off   - Clifford reports that she started to be afraid to be around him alone  - When he was found in the hotel, he had a generator full of gasoline, binoculars, bullets, and hunting equipment.     10/21/24: updated daughter on Santos's treatment plan      10/23/24: updated daughter on Santos's treatment plan      10/25/24: updated daughter on Santos's treatment plan, including plan to try and get MRI brain done under sedation. She said that Santos's grandfather had dementia and his sister has a brain tumor.      10/28/24: updated daughter on Santos's MRI results. She is planning on coming into town soon.      Care conference 11/1/24 with daughters Maria C and Estefani  and ex-wife Clifford  - Discussed dementia diagnosis   - Clifford asked about plans moving forward. Explained that applying for medical assistance is the first step. Explained that application processing time can be very variable. Informed family that Santos will most likely be staying on this inpatient unit during this time.   - Clifford expressed that their family would like to be the power of /have conservatorship for Santos.   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 Reports that he has bills that he has not paid.   - Maria C is planning to move to [place], and Clifford currently lives in [place]. Family prefer that Santos would in a facility that are near these locations. Discussed with family that they can also try to request a specific location (memory care).   - Clifford reports that he had previously gotten help applying for his social security and medicare, but unsure if it had been approved.   - Informed family that there is a geriatric unit and there might be a transfer if there becomes availability on this unit.   - Family shared that he was completely different just two months ago.   - Estefani shared that his family found his medications in his old truck recently, expressed that it was likely that he was not taking his medications prior to his hospitalization.      11/6/24: updated Maria C on plan to try and transfer Santos to Geriatric unit.      11/11/24: updated Maria C on neurology consult      The risks, benefits, alternatives, and side effects were discussed and understood by the patient and other caregivers.     Medical Assessment and Plan     Medical diagnoses to be addressed this admission:    # Hip Pain  - New right hip pain, and mild pain in multiple joints. Moderate response to topical antiinflammatory gel and lidocaine patch.   - Medicine consulted for recommendations 12/20    # CHF  # Hypertension  - Continue PTA medications  Furosemide  40 mg daily  Lisinopril 40 mg daily  Metoprolol 50 mg daily  Amlodipine 10 mg daily  Clonidine 0.1 mg BID  - Pitting edema in lower extremities, not painful 3+: Medicine consulted for recommendations 12/20    # Hyperlipidemia  - Continue PTA Atorvastatin 40 mg     # Primary Hyperparathyroidism  # Hypercalcemia, hypophosphoremia   Increased Ca level to 10.9 and decreased Ph level to 2.3 in the ED. Suspicion patient's hypercalcemia could be contributing to symptoms of psychosis.  - Consulted endocrinology, who started cinacalcet 30 mg BID on 10/13  - 10/16 endocrinology recommended continuing to trend calcium and albumin to make sure patient does not become hypocalcemic and recommended holding cinacalcet   - 10/17, calcium and albumin wnl, endo recommended cinacalcet 30 mg once daily   - 10/21, per endo continue cincaclcet and recheck calcium and albumin on October 24  - 10/23: per endo, patient needs to follow up outpatient for further management of hyperparathyroidism      Medical course: Patient was physically examined by the ED prior to being transferred to the unit and was found to be medically stable and appropriate for admission.      Consults: Psychiatry, Endocrinology (follow-up of hyperthyroidism / hypercalcemia and hypertension), neurology     Checklist     Legal Status: Committed   MI Commitment with [facility]  File Number: [id number]  Start and expiration date of commitment: 10/24/24 - 04/24/25     [facility] meds: Haldol, Clozaril, Risperdal, Invega, Zyprexa, Seroquel, Abilify     PPS/CM:  Shelby Chowdary: [phone]  [email or web address].    Guardian/Conservator:  Clifford Aaron is currently emergency until 01/20/2025.       Safety Assessment:   Behavioral Orders   Procedures    Code 1 - Restrict to Unit    Routine Programming     As clinically indicated    Status 15     Every 15 minutes.    Status Individual Observation     SIO AT NIGHT     Patient SIO status reviewed with  team/RN.  Please also refer to RN/team documentation for add'l detail.    -SIO staff to monitor following which have contributed to patient being on SIO:  Disruptive behavior at night     -When following observed, team will review discontinuation of SIO:  Less disruptive and redirectable at night     Order Specific Question:   CONTINUOUS 24 hours / day     Answer:   Other     Order Specific Question:   Specify distance     Answer:   10 ft     Order Specific Question:   Indications for SIO     Answer:   Severe intrusiveness       Risk Assessment:  Risk for harm is low  Risk factors: impulsive and past behaviors  Protective factors: family      Disposition: Pending stabilization, medication optimization, & development of a safe discharge plan.     Attestations     Resident without student: This patient was seen and discussed with my attending physician.    Rocío Orellana MD  N Psychiatry Resident  12/20/2024

## 2024-12-20 NOTE — PLAN OF CARE
"Pt reported right hip pain this am pt had rec'd PRN tyelnol 650mg at 0508. Pt had couple of warm packs on his hip.  Pt irritated on how the warm pack are designed and he stated that a  would never design them this way.  Pt pt continues to have LE edema.  MD informed.  Pt had labs drawn and completed a U/S of bilateral lower extremities. Pt has a medicine consult pending for BP, right hip pain, and his edema.  Pt at times confused.  Pt walking down [place] and stated he was going to his garage.  Pt at time seems loss for words.  Pt been eating well. No bs issues.      Problem: Adult Behavioral Health Plan of Care  Goal: Plan of Care Review  12/20/2024 1524 by Catherine Szymanski RN  Outcome: Not Progressing  12/20/2024 1504 by Catherine Szymanski RN  Outcome: Not Progressing  12/20/2024 1454 by Catherine Szymanski RN  Outcome: Not Progressing  Goal: Patient-Specific Goal (Individualization)  Description: You can add care plan individualizations to a care plan. Examples of Individualization might be:  \"Parent requests to be called daily at 9am for status\", \"I have a hard time hearing out of my right ear\", or \"Do not touch me to wake me up as it startles  me\".  12/20/2024 1524 by Catherine Szymanski RN  Outcome: Not Progressing  12/20/2024 1504 by Catherine Szymanski RN  Outcome: Not Progressing  12/20/2024 1454 by Catherine Szymanski RN  Outcome: Not Progressing  Goal: Adheres to Safety Considerations for Self and Others  12/20/2024 1524 by Catherine Szymanski RN  Outcome: Not Progressing  12/20/2024 1504 by Catherine Szymanski RN  Outcome: Not Progressing  12/20/2024 1454 by Catherine Szymanski RN  Outcome: Not Progressing  Intervention: Develop and Maintain Individualized Safety Plan  Recent Flowsheet Documentation  Taken 12/20/2024 1400 by Catherine Szymanski RN  Safety Measures: environmental rounds completed  Intervention: Develop and Maintain Individualized Safety Plan  Recent Flowsheet Documentation  Taken 12/20/2024 1400 by Catherine Szymanski RN  Safety " Measures: environmental rounds completed  Goal: Absence of New-Onset Illness or Injury  12/20/2024 1524 by Catherine Szymanski RN  Outcome: Not Progressing  12/20/2024 1504 by Catherine Szymanski RN  Outcome: Not Progressing  12/20/2024 1454 by Catherine Szymanski RN  Outcome: Not Progressing  Intervention: Identify and Manage Fall Risk  Recent Flowsheet Documentation  Taken 12/20/2024 1400 by Catherine Szymanski RN  Safety Measures: environmental rounds completed  Goal: Optimized Coping Skills in Response to Life Stressors  12/20/2024 1524 by Catherine Szymanski RN  Outcome: Not Progressing  12/20/2024 1504 by Catherine Szymanski RN  Outcome: Not Progressing  12/20/2024 1454 by Catherine Szymanski RN  Outcome: Not Progressing  Goal: Develops/Participates in Therapeutic [place] to Support Successful Transition  12/20/2024 1524 by Catherine Szymanski RN  Outcome: Not Progressing  12/20/2024 1504 by Catherine Szymanski RN  Outcome: Not Progressing  12/20/2024 1454 by Catherine Szymanski RN  Outcome: Not Progressing     Problem: Psychotic Signs/Symptoms  Goal: Improved Behavioral Control (Psychotic Signs/Symptoms)  12/20/2024 1524 by Catherine Szymanski RN  Outcome: Not Progressing  12/20/2024 1504 by Catherine Szymanski RN  Outcome: Not Progressing  12/20/2024 1454 by Catherine Szymanski RN  Outcome: Not Progressing  Goal: Optimal Cognitive Function (Psychotic Signs/Symptoms)  12/20/2024 1524 by Catherine Szymanski RN  Outcome: Not Progressing  12/20/2024 1504 by Catherine Szymanski RN  Outcome: Not Progressing  12/20/2024 1454 by Catherine Szymanski RN  Outcome: Not Progressing  Goal: Increased Participation and Engagement (Psychotic Signs/Symptoms)  12/20/2024 1524 by Catherine Szymanski RN  Outcome: Not Progressing  12/20/2024 1504 by Catherine Szymanski RN  Outcome: Not Progressing  12/20/2024 1454 by Catherine Szymanski RN  Outcome: Not Progressing  Goal: Improved Mood Symptoms (Psychotic Signs/Symptoms)  12/20/2024 1524 by Catherine Szymanski RN  Outcome: Not Progressing  12/20/2024 1504 by Catherine Szymanski  RN  Outcome: Not Progressing  12/20/2024 1454 by Catherine Szymanski RN  Outcome: Not Progressing  Goal: Improved Psychomotor Symptoms (Psychotic Signs/Symptoms)  12/20/2024 1524 by Catherine Szymanski RN  Outcome: Not Progressing  12/20/2024 1504 by Catherine Syzmanski RN  Outcome: Not Progressing  12/20/2024 1454 by Catherine Szymanski RN  Outcome: Not Progressing  Intervention: Manage Psychomotor Movement  Recent Flowsheet Documentation  Taken 12/20/2024 1400 by Catherine Szymanski RN  Activity (Behavioral Health):   up ad diya   activity encouraged  Goal: Decreased Sensory Symptoms (Psychotic Signs/Symptoms)  12/20/2024 1524 by Catherine Szymanski RN  Outcome: Not Progressing  12/20/2024 1504 by Catherine Szymanski RN  Outcome: Not Progressing  12/20/2024 1454 by Catherine Szymanski RN  Outcome: Not Progressing  Goal: Improved Sleep (Psychotic Signs/Symptoms)  12/20/2024 1524 by Catherine Szymanski RN  Outcome: Not Progressing  12/20/2024 1504 by Catherine Szymanski RN  Outcome: Not Progressing  12/20/2024 1454 by Catherine Szymanski RN  Outcome: Not Progressing  Goal: Enhanced Social, Occupational or Functional Skills (Psychotic Signs/Symptoms)  12/20/2024 1524 by Catherine Szymanski RN  Outcome: Not Progressing  12/20/2024 1504 by Catherine Szymanski RN  Outcome: Not Progressing  12/20/2024 1454 by Catherine Szymanski RN  Outcome: Not Progressing     Problem: Depression  Goal: Improved Mood  12/20/2024 1524 by Catherine Szymanski RN  Outcome: Not Progressing  12/20/2024 1504 by Catherine Szymanski RN  Outcome: Not Progressing  12/20/2024 1454 by Catherine Szymanski RN  Outcome: Not Progressing     Problem: Suicidal Behavior  Goal: Suicidal Behavior is Absent or Managed  12/20/2024 1524 by Catherine Szymanski RN  Outcome: Not Progressing  12/20/2024 1504 by Catherine Szymanski RN  Outcome: Not Progressing  12/20/2024 1454 by Catherine Szymanski RN  Outcome: Not Progressing     Problem: Anxiety  Goal: Anxiety Reduction or Resolution  12/20/2024 1524 by Catherine Szymanski RN  Outcome: Not Progressing  12/20/2024  1504 by Catherine Szymanski RN  Outcome: Not Progressing  12/20/2024 1454 by Catherine Szymanski RN  Outcome: Not Progressing     Problem: Sleep Disturbance  Goal: Adequate Sleep/Rest  12/20/2024 1524 by Catherine Szymanski RN  Outcome: Not Progressing  12/20/2024 1504 by Catherine Szymanski RN  Outcome: Not Progressing  12/20/2024 1454 by Catherine Szymanski RN  Outcome: Not Progressing     Problem: Seclusion/Restraint, Behavioral  Goal: Seclusion/Behavioral Restraint Goal: Absence of Harm or Injury  12/20/2024 1524 by Catherine Szymanski RN  Outcome: Not Progressing  12/20/2024 1504 by Catherine Szymanski RN  Outcome: Not Progressing  12/20/2024 1454 by Catherine Szymanski RN  Outcome: Not Progressing  Intervention: Implement Least Restrictive Safety Strategies  Recent Flowsheet Documentation  Taken 12/20/2024 1400 by Catherine Szymanski RN  Safety Measures: environmental rounds completed     Problem: Pain Acute  Goal: Optimal Pain Control and Function  12/20/2024 1524 by Catherine Szymanski RN  Outcome: Not Progressing  12/20/2024 1504 by Catherine Szymanski RN  Outcome: Not Progressing  12/20/2024 1454 by Catherine Szymanski RN  Outcome: Not Progressing     Problem: Manic or Hypomanic Signs/Symptoms  Goal: Improved Impulse Control (Manic/Hypomanic Signs/Symptoms)  12/20/2024 1524 by Catherine Szymanski RN  Outcome: Not Progressing  12/20/2024 1504 by Catherine Szymanski RN  Outcome: Not Progressing  12/20/2024 1454 by Catherine Szymanski RN  Outcome: Not Progressing  Goal: Optimized Cognitive Function (Manic/Hypomanic Signs/Symptoms)  12/20/2024 1524 by Catherine Szymanski RN  Outcome: Not Progressing  12/20/2024 1504 by Catherine Szymanski RN  Outcome: Not Progressing  12/20/2024 1454 by Catherine Szymanski RN  Outcome: Not Progressing  Goal: Improved Mood Symptoms (Manic/Hypomanic Signs/Symptoms)  12/20/2024 1524 by Catherine Szymanski RN  Outcome: Not Progressing  12/20/2024 1504 by Catherine Szymanski RN  Outcome: Not Progressing  12/20/2024 1454 by Catherine Szymanski RN  Outcome: Not Progressing  Goal:  Optimized Nutrition Intake (Manic or Hypomanic Signs/Symptoms)  12/20/2024 1524 by Catherine Szymanski RN  Outcome: Not Progressing  12/20/2024 1504 by Catherine Szymanski RN  Outcome: Not Progressing  12/20/2024 1454 by Catherine Szymanski RN  Outcome: Not Progressing  Goal: Improved Psychomotor Symptoms (Manic/Hypomanic Signs/Symptoms)  12/20/2024 1524 by Catherine Szymanski RN  Outcome: Not Progressing  12/20/2024 1504 by Catherine Szymanski RN  Outcome: Not Progressing  12/20/2024 1454 by Catherine Szymanski RN  Outcome: Not Progressing  Intervention: Manage Psychomotor Movement  Recent Flowsheet Documentation  Taken 12/20/2024 1400 by Catherine Szymanski RN  Activity (Behavioral Health):   up ad diya   activity encouraged  Goal: Improved Sleep (Manic/Hypomanic Signs/Symptoms)  12/20/2024 1524 by Catherine Szymanski RN  Outcome: Not Progressing  12/20/2024 1504 by Catherine Szymanski RN  Outcome: Not Progressing  12/20/2024 1454 by Catherine Szymanski RN  Outcome: Not Progressing  Goal: Enhanced Social, Occupational or Functional Skills (Manic/Hypomanic Signs/Symptoms)  12/20/2024 1524 by Catherine Szymanski RN  Outcome: Not Progressing  12/20/2024 1504 by Catherine Szymanski RN  Outcome: Not Progressing  12/20/2024 1454 by Catherine Szymanski RN  Outcome: Not Progressing   Goal Outcome Evaluation:

## 2024-12-20 NOTE — PLAN OF CARE
Problem: Psychotic Signs/Symptoms  Goal: Improved Behavioral Control (Psychotic Signs/Symptoms)  Outcome: Progressing  Intervention: Manage Behavior  Recent Flowsheet Documentation  Taken 12/20/2024 1700 by Pool Urbina RN  De-Escalation Techniques: verbally redirected     Problem: Pain Acute  Goal: Optimal Pain Control and Function  Outcome: Progressing  Intervention: Optimize Psychosocial Wellbeing  Recent Flowsheet Documentation  Taken 12/20/2024 1735 by Pool Urbina RN  Supportive Measures:   problem-solving facilitated   self-care encouraged  Taken 12/20/2024 1700 by Pool Urbina RN  Supportive Measures:   active listening utilized   positive reinforcement provided   relaxation techniques promoted   self-care encouraged   self-reflection promoted   verbalization of feelings encouraged  Intervention: Develop Pain Management Plan  Recent Flowsheet Documentation  Taken 12/20/2024 1700 by Pool Urbina RN  Pain Management Interventions: medication (see MAR)  Intervention: Prevent or Manage Pain  Recent Flowsheet Documentation  Taken 12/20/2024 1735 by Pool Urbina RN  Sleep/Rest Enhancement:   noise level reduced   relaxation techniques promoted  Taken 12/20/2024 1700 by Pool Urbina RN  Sensory Stimulation Regulation: quiet environment promoted  Sleep/Rest Enhancement: regular sleep/rest pattern promoted  Bowel Elimination Promotion:   adequate fluid intake promoted   ambulation promoted   Goal Outcome Evaluation:    Plan of Care Reviewed With: patient      Patient was visible in the the lounge area majority of the shift watching tv. Affect  remained flat, but pleasant on approach, with patient appearing confused intermittently. He denied all mental health symptom, but endorsed a right hip pain of 5/10, and requested and received Lidocaine patch  with some relief. He was overall calm and cooperative and took his evening medications willingly.

## 2024-12-20 NOTE — PLAN OF CARE
Team Note Due:  Monday    Assessment/Intervention/Current Symtoms and Care Coordination:  Chart review and met with team, discussed pt progress, symptomology, and response to treatment.  Discussed the discharge plan and any potential impediments to discharge. Clifford Aaron is currently emergency guardian/conservator until 01/20/2025.    Spoke with Anneliese at [facility] and she confirmed staff will be coming to visit this weekend but is unsure of day/time. RN updated.    Discharge Plan or Goal:  Memory care facility     Barriers to Discharge:  Patient requires further psychiatric stabilization due to current symptomology, medication management with changes subject to provider, coordination with outside supports, and aftercare planning. Pt is under civil commitment.     Referral Status:    Memory Care facilities currently in process:  [facility]. Tour completed 11/27.  [facility] [place]. Tour completed 11/29.  [facility]. Tour completed 11/30.  [facility] [place]. Records sent 12/16/14.  Nikki Noel: [email or web address]; Office [phone], Fax [phone]    Memory Care facilities pending family review:  [facility] [place].   [facility] [place].  [facility].  [facility].  [facility].  [facility].    Memory Care facilities declined:  [facility] (private pay only, no EW).  [facility] [address] (private pay only, no EW).  [facility] (no openings).  [facility]. Tour completed 11/29. Records sent 12/2/24. Declined 12/19/24 (don't feel they are an appropriate facility for pt's needs).  Lorraine (): [email or web address]; [phone]  Isatu (director of nursing): [email or web address]     Legal Status:  MI Commitment with [facility]:  [place]  File Number: [id number]  Start and expiration date of commitment: 10/24/24 - 04/24/25    [place] meds: Haldol, Clozaril, Risperdal, Invega, Zyprexa, Seroquel, Abilify    PPS/CM:  Shelby Chowdary: [phone]  [email or web address]    Contacts:  Maria C Aaron (Daughter): [phone]   Clifford Agnieszka (ex-wife): [phone]     No Del Angel (guardianship/conservatorship ): [phone]  [email or web address]     Upcoming Meetings and Dates/Important Information and next steps:  Follow up with family regarding list of Memory Care facilities  Discharge planning when appropriate  Pt does not qualify for MA per financial counselor on 11/8/2024. Pt will likely privately pay for Memory Care until he qualifies for MA/waivers in the future.  MNChoice Assessment scheduled on 12/31 at 10:00am with Amanda Beard.  Call scheduling team if pt is discharged or if this needs to be rescheduled: [phone]     Provisional Discharge and Change of Status needed at discharge

## 2024-12-20 NOTE — PLAN OF CARE
Pt was visible in the milieu in the lounge watching tv most of the time. Pt presents with flat affect. Pt is intermittently confused. Denies for anxiety, depression, suicidal thoughts or hallucinations. Pt c/o pain on right hip and bilateral knees. Lidocaine patch and Voltaren gel ordered and administered. Pt reported moderate relief. Pt ate adequate for supper and snacks. Medications compliant. Prn Tylenol administered bedtime for hip pain. Vital signs:  Temp: 97.5  F (36.4  C) Temp src: Oral BP: (!) 148/72 Pulse: 71   Resp: 18 SpO2: 94 % O2 Device: None (Room air)     Goal Outcome Evaluation:    Plan of Care Reviewed With: patient      Problem: Psychotic Signs/Symptoms  Goal: Improved Behavioral Control (Psychotic Signs/Symptoms)  Outcome: Progressing  Intervention: Manage Behavior  Recent Flowsheet Documentation  Taken 12/19/2024 1800 by Noah Murray RN  De-Escalation Techniques: verbally redirected  Goal: Increased Participation and Engagement (Psychotic Signs/Symptoms)  Intervention: Facilitate Participation and Engagement  Recent Flowsheet Documentation  Taken 12/19/2024 1800 by Noah Murray RN  Diversional Activity: television  Goal: Improved Mood Symptoms (Psychotic Signs/Symptoms)  Intervention: Optimize Emotion and Mood  Recent Flowsheet Documentation  Taken 12/19/2024 1800 by Noah Murray RN  Diversional Activity: television  Intervention: Optimize Emotion and Mood  Recent Flowsheet Documentation  Taken 12/19/2024 1800 by Noah Murray RN  Diversional Activity: television  Goal: Improved Psychomotor Symptoms (Psychotic Signs/Symptoms)  Intervention: Manage Psychomotor Movement  Recent Flowsheet Documentation  Taken 12/19/2024 1800 by Noah Murray RN  Diversional Activity: television  Activity (Behavioral Health):   up ad diya   activity encouraged     Problem: Depression  Goal: Improved Mood  Outcome: Progressing     Problem: Suicidal Behavior  Goal: Suicidal Behavior is Absent or  Managed  Outcome: Progressing     Problem: Anxiety  Goal: Anxiety Reduction or Resolution  Outcome: Progressing

## 2024-12-20 NOTE — PLAN OF CARE
BEH IP Unit Acuity Rating Score (UARS)  Patient is given one point for every criteria they meet.    CRITERIA SCORING   On a 72 hour hold, court hold, committed, stay of commitment, or revocation. 1    Patient LOS on BEH unit exceeds 20 days. 1  LOS: 69   Patient under guardianship, 55+, otherwise medically complex, or under age 11. 1   Suicide ideation without relief of precipitating factors. 0   Current plan for suicide. 0   Current plan for homicide. 0   Imminent risk or actual attempt to seriously harm another without relief of factors precipitating the attempt. 1   Severe dysfunction in daily living (ex: complete neglect for self care, extreme disruption in vegetative function, extreme deterioration in social interactions). 1   Recent (last 7 days) or current physical aggression in the ED or on unit. 0   Restraints or seclusion episode in past 72 hours. 0   Recent (last 7 days) or current verbal aggression, agitation, yelling, etc., while in the ED or unit. 0   Active psychosis. 1   Need for constant or near constant redirection (from leaving, from others, etc).  0   Intrusive or disruptive behaviors. 1   Patient requires 3 or more hours of individualized nursing care per 8-hour shift (i.e. for ADLs, meds, therapeutic interventions). 1   TOTAL 8

## 2024-12-21 PROCEDURE — 124N000002 HC R&B MH UMMC

## 2024-12-21 PROCEDURE — 250N000013 HC RX MED GY IP 250 OP 250 PS 637

## 2024-12-21 RX ADMIN — MELATONIN TAB 3 MG 3 MG: 3 TAB at 19:16

## 2024-12-21 RX ADMIN — LIDOCAINE 4% 1 PATCH: 40 PATCH TOPICAL at 21:19

## 2024-12-21 RX ADMIN — ACETAMINOPHEN 650 MG: 325 TABLET, FILM COATED ORAL at 02:21

## 2024-12-21 RX ADMIN — CLONIDINE HYDROCHLORIDE 0.1 MG: 0.1 TABLET ORAL at 19:16

## 2024-12-21 RX ADMIN — CINACALCET 30 MG: 30 TABLET ORAL at 08:51

## 2024-12-21 RX ADMIN — ACETAMINOPHEN 650 MG: 325 TABLET, FILM COATED ORAL at 15:25

## 2024-12-21 RX ADMIN — CLONIDINE HYDROCHLORIDE 0.1 MG: 0.1 TABLET ORAL at 08:50

## 2024-12-21 RX ADMIN — ATORVASTATIN CALCIUM 40 MG: 20 TABLET, FILM COATED ORAL at 08:51

## 2024-12-21 RX ADMIN — LISINOPRIL 40 MG: 10 TABLET ORAL at 08:50

## 2024-12-21 RX ADMIN — FUROSEMIDE 40 MG: 40 TABLET ORAL at 08:50

## 2024-12-21 RX ADMIN — METOPROLOL SUCCINATE 50 MG: 50 TABLET, EXTENDED RELEASE ORAL at 08:50

## 2024-12-21 RX ADMIN — Medication 25 MG: at 21:18

## 2024-12-21 RX ADMIN — AMLODIPINE BESYLATE 10 MG: 5 TABLET ORAL at 08:51

## 2024-12-21 RX ADMIN — OLANZAPINE 5 MG: 5 TABLET, ORALLY DISINTEGRATING ORAL at 21:18

## 2024-12-21 ASSESSMENT — ACTIVITIES OF DAILY LIVING (ADL)
ADLS_ACUITY_SCORE: 66

## 2024-12-21 NOTE — PLAN OF CARE
Problem: Sleep Disturbance  Goal: Adequate Sleep/Rest  Outcome: Not Progressing   Patient;s affect flat and blunted. Patient complained of R hip pain and R knee pain rate pain as 10/10- Tylenol given and he was able to sleep after. Patient's Lidocaine patch on the R thigh was removed. Patient had an US of BLE and result was negative for DVT. Patient sleep on and off. Patient calm no behavioral issue or any safety concerns at this time. Will continue with current plan of care. Notify MD with any concerns.

## 2024-12-21 NOTE — PLAN OF CARE
"  Problem: Psychotic Signs/Symptoms  Goal: Improved Behavioral Control (Psychotic Signs/Symptoms)  Outcome: Progressing  Intervention: Manage Behavior  Recent Flowsheet Documentation  Taken 12/21/2024 1700 by Pool Urbina RN  De-Escalation Techniques: verbally redirected     Problem: Pain Acute  Goal: Optimal Pain Control and Function  Outcome: Progressing  Intervention: Optimize Psychosocial Wellbeing  Recent Flowsheet Documentation  Taken 12/21/2024 1700 by Pool Urbina RN  Supportive Measures:   active listening utilized   positive reinforcement provided   relaxation techniques promoted   self-care encouraged   self-reflection promoted   verbalization of feelings encouraged  Intervention: Develop Pain Management Plan  Recent Flowsheet Documentation  Taken 12/21/2024 1700 by Pool Urbina RN  Pain Management Interventions: medication (see MAR)  Intervention: Prevent or Manage Pain  Recent Flowsheet Documentation  Taken 12/21/2024 1700 by Pool Urbina RN  Sensory Stimulation Regulation: quiet environment promoted  Sleep/Rest Enhancement: regular sleep/rest pattern promoted  Bowel Elimination Promotion:   adequate fluid intake promoted   ambulation promoted   Goal Outcome Evaluation:    Plan of Care Reviewed With: patient      Patient is still mildly confused, but less disorganized. He socializes appropriately with others and  denies all psych symptoms. Vitals are normal and patient is cooperative and friendly, but reports a severe hip pain rate @ 10 and can barely walk. Lidocaine patch PRN  administered with little or no effect as patient repeatedly states \" it hurts. It still hurts very much. You are not doing your job. I told you and you spent too much time.The pain is bad.\" Resident notified and patient offered ice pack, which appeared to be helpful. Appetite was poor this shift as evidence by patient not eating dinner and HS snacks. He remains compliant with medications. Will continue to support " and monitor.

## 2024-12-21 NOTE — CONSULTS
"Brief Consult Note   21 December 2024       Internal Medicine consulted for right hip pain, knee pain, and bilateral lower extremity edema.  Attempted to see patient x 2 yesterday and was eating lunch and then had gone to ultrasound.  Today he declines physical exam, but does tell me his R hip and leg hurt, but that it is nothing new.  He says he \"doesn't have\" any swelling.  I asked about exam and he says \"I'm not doing it\".  US venous doppler of bilateral lower extremities is negative for DVT but does show a 3.8cm fluid collection in R popliteal fossa c/w Baker's cyst.  This can be managed as outpatient unless there is concern for septic joint.  Currently afebrile and no acute distress.       Medicine will sign off, no further recommendations at this time.  Please feel free to reconsult if patient's symptoms worsen or if new problems arise.  Thank you for the opportunity to care for this patient.     Elisa Mitchell, CNP, APRN  Internal Medicine [facility]  [employer]  Available on [employer]    "

## 2024-12-21 NOTE — PLAN OF CARE
"Per the start of the shift pt was calm and cooperative. He reported his mood as \"okay\" and was med complaint. Pt had a calm demeanor with a depressed affect. He ate breakfast/lunch in the lounge, watched football with peers and napped through the shift. Pt denied anxiety,depression, SI,HI,SIB, paranoia and AVC hallucinations.Denied pain.No medical or behavioral concerns.     BP (!) 152/89 (BP Location: Right arm)   Pulse 56   Temp 98.3  F (36.8  C) (Oral)   Resp 18   Wt 110.6 kg (243 lb 12.8 oz)   SpO2 96%   BMI 39.35 kg/m         Last 24H PRN:     acetaminophen (TYLENOL) tablet 650 mg, 650 mg at 12/21/24 0221    Lidocaine (LIDOCARE) 4 % Patch 1 patch, 1 patch at 12/20/24 4285   "

## 2024-12-22 PROCEDURE — 250N000013 HC RX MED GY IP 250 OP 250 PS 637

## 2024-12-22 PROCEDURE — 124N000002 HC R&B MH UMMC

## 2024-12-22 RX ADMIN — DICLOFENAC SODIUM TOPICAL GEL, 1% 2 G: 10 GEL TOPICAL at 20:32

## 2024-12-22 RX ADMIN — ACETAMINOPHEN 650 MG: 325 TABLET, FILM COATED ORAL at 05:18

## 2024-12-22 RX ADMIN — FUROSEMIDE 40 MG: 40 TABLET ORAL at 08:17

## 2024-12-22 RX ADMIN — METOPROLOL SUCCINATE 50 MG: 50 TABLET, EXTENDED RELEASE ORAL at 08:17

## 2024-12-22 RX ADMIN — ACETAMINOPHEN 650 MG: 325 TABLET, FILM COATED ORAL at 17:51

## 2024-12-22 RX ADMIN — MELATONIN TAB 3 MG 3 MG: 3 TAB at 20:21

## 2024-12-22 RX ADMIN — CLONIDINE HYDROCHLORIDE 0.1 MG: 0.1 TABLET ORAL at 08:16

## 2024-12-22 RX ADMIN — Medication 25 MG: at 21:00

## 2024-12-22 RX ADMIN — LISINOPRIL 40 MG: 10 TABLET ORAL at 08:16

## 2024-12-22 RX ADMIN — AMLODIPINE BESYLATE 10 MG: 5 TABLET ORAL at 08:16

## 2024-12-22 RX ADMIN — ACETAMINOPHEN 650 MG: 325 TABLET, FILM COATED ORAL at 09:51

## 2024-12-22 RX ADMIN — CLONIDINE HYDROCHLORIDE 0.1 MG: 0.1 TABLET ORAL at 20:20

## 2024-12-22 RX ADMIN — CINACALCET 30 MG: 30 TABLET ORAL at 08:16

## 2024-12-22 RX ADMIN — ATORVASTATIN CALCIUM 40 MG: 20 TABLET, FILM COATED ORAL at 08:16

## 2024-12-22 RX ADMIN — LIDOCAINE 4% 1 PATCH: 40 PATCH TOPICAL at 20:23

## 2024-12-22 RX ADMIN — OLANZAPINE 5 MG: 5 TABLET, ORALLY DISINTEGRATING ORAL at 20:21

## 2024-12-22 ASSESSMENT — ACTIVITIES OF DAILY LIVING (ADL)
ADLS_ACUITY_SCORE: 66
HYGIENE/GROOMING: INDEPENDENT
ADLS_ACUITY_SCORE: 66
ORAL_HYGIENE: INDEPENDENT
ADLS_ACUITY_SCORE: 66
LAUNDRY: WITH SUPERVISION
ADLS_ACUITY_SCORE: 66
DRESS: INDEPENDENT
ADLS_ACUITY_SCORE: 66

## 2024-12-22 NOTE — PLAN OF CARE
"Per the start of the shift pt was laying in bed. He eventually got up, ate breakfast then took his meds. He spent most of the shift watching tv, napping in the lounge area and his room. Pt was in the lounge while staff was handling another pts code, and he refused to go to the conference room or his room for safety saying 'I don't have to. Leave me alone.\".Pt was irritable with a depressed affect. He reported his mood as \"crankey\" but was labile. At  times being tearful, to being upset then to trying to make jokes. He denied anxiety,depression, SI,HI,SIB, paranoia and AVC hallucinations. No medical or other behavioral concerns. Used tylenol and hot packs to manage side pain which he reported as high today but was relieved with interventions.    BP (!) 146/79 (BP Location: Right arm, Patient Position: Sitting)   Pulse 65   Temp 98  F (36.7  C) (Oral)   Resp 18   Wt 110.6 kg (243 lb 12.8 oz)   SpO2 97%   BMI 39.35 kg/m         Last 24H PRN:     acetaminophen (TYLENOL) tablet 650 mg, 650 mg at 12/22/24 0951    Lidocaine (LIDOCARE) 4 % Patch 1 patch, 1 patch at 12/21/24 4399   "

## 2024-12-22 NOTE — PLAN OF CARE
Problem: Psychotic Signs/Symptoms  Goal: Improved Behavioral Control (Psychotic Signs/Symptoms)  Outcome: Progressing  Intervention: Manage Behavior  Recent Flowsheet Documentation  Taken 12/22/2024 1700 by Pool Urbina RN  De-Escalation Techniques: verbally redirected     Problem: Pain Acute  Goal: Optimal Pain Control and Function  Outcome: Progressing  Intervention: Optimize Psychosocial Wellbeing  Recent Flowsheet Documentation  Taken 12/22/2024 1717 by Pool Urbina RN  Supportive Measures:   active listening utilized   problem-solving facilitated   positive reinforcement provided   relaxation techniques promoted   self-care encouraged  Taken 12/22/2024 1700 by Pool Urbina RN  Supportive Measures:   active listening utilized   positive reinforcement provided   relaxation techniques promoted   self-care encouraged   self-reflection promoted   verbalization of feelings encouraged  Intervention: Develop Pain Management Plan  Recent Flowsheet Documentation  Taken 12/22/2024 1700 by Pool Urbina RN  Pain Management Interventions: medication (see MAR)  Intervention: Prevent or Manage Pain  Recent Flowsheet Documentation  Taken 12/22/2024 1717 by Pool Urbina RN  Sleep/Rest Enhancement: relaxation techniques promoted  Taken 12/22/2024 1700 by Pool Urbina RN  Sensory Stimulation Regulation: quiet environment promoted  Sleep/Rest Enhancement: regular sleep/rest pattern promoted  Bowel Elimination Promotion:   adequate fluid intake promoted   ambulation promoted   Goal Outcome Evaluation:    Plan of Care Reviewed With: patient      Patient was somewhat disorganized, and difficult to redirect this shift. He reported right hip pain  and was administered tylenol 650 mg, lidocaine patch PRN, which was effective.Vitals were stable; patient was calm, friendly and funny, and affect was bright and approachable. He denied all psych symptoms.Mentation was still somewhat disorganized and confused, but  significantly better, compared to the past couple of weeks. Patient ate and hydrated well. Will continue to encourage.

## 2024-12-22 NOTE — PLAN OF CARE
Problem: Sleep Disturbance  Goal: Adequate Sleep/Rest  Outcome: Progressing   Patient sleeping most of the time in the chair. Patient woke up complained of  pain on his R hip. Patient was medicated with Tylenol and heat applied, Lidocaine patch on the R hip. Patient was encouraged to elevate BLE on a pillow to  decrease edema. Continuous to have swelling on B hands and edema on BLE. Patient went in his room, assisted to get situated in bed, warm pack reapplied. Patient continuous on SIO, mood labile more agitated but redirectable. Will continue to monitor  the patient and notify MD with any issues. Patient slept for 5.75 hours this shift.

## 2024-12-23 PROCEDURE — 250N000013 HC RX MED GY IP 250 OP 250 PS 637

## 2024-12-23 PROCEDURE — 99231 SBSQ HOSP IP/OBS SF/LOW 25: CPT | Mod: GC | Performed by: PSYCHIATRY & NEUROLOGY

## 2024-12-23 PROCEDURE — 124N000002 HC R&B MH UMMC

## 2024-12-23 RX ADMIN — ATORVASTATIN CALCIUM 40 MG: 20 TABLET, FILM COATED ORAL at 09:04

## 2024-12-23 RX ADMIN — CLONIDINE HYDROCHLORIDE 0.1 MG: 0.1 TABLET ORAL at 09:04

## 2024-12-23 RX ADMIN — METOPROLOL SUCCINATE 50 MG: 50 TABLET, EXTENDED RELEASE ORAL at 09:04

## 2024-12-23 RX ADMIN — MELATONIN TAB 3 MG 3 MG: 3 TAB at 20:02

## 2024-12-23 RX ADMIN — OLANZAPINE 5 MG: 5 TABLET, ORALLY DISINTEGRATING ORAL at 20:02

## 2024-12-23 RX ADMIN — CLONIDINE HYDROCHLORIDE 0.1 MG: 0.1 TABLET ORAL at 20:02

## 2024-12-23 RX ADMIN — Medication 1 PATCH: at 09:02

## 2024-12-23 RX ADMIN — Medication 25 MG: at 20:02

## 2024-12-23 RX ADMIN — ACETAMINOPHEN 650 MG: 325 TABLET, FILM COATED ORAL at 15:16

## 2024-12-23 RX ADMIN — ACETAMINOPHEN 650 MG: 325 TABLET, FILM COATED ORAL at 02:49

## 2024-12-23 RX ADMIN — FUROSEMIDE 40 MG: 40 TABLET ORAL at 09:04

## 2024-12-23 RX ADMIN — LISINOPRIL 40 MG: 10 TABLET ORAL at 09:04

## 2024-12-23 RX ADMIN — CINACALCET 30 MG: 30 TABLET ORAL at 09:04

## 2024-12-23 RX ADMIN — AMLODIPINE BESYLATE 10 MG: 5 TABLET ORAL at 09:04

## 2024-12-23 ASSESSMENT — ACTIVITIES OF DAILY LIVING (ADL)
ADLS_ACUITY_SCORE: 66
ADLS_ACUITY_SCORE: 66
DRESS: INDEPENDENT;STREET CLOTHES
ADLS_ACUITY_SCORE: 66
HYGIENE/GROOMING: HANDWASHING;INDEPENDENT
ADLS_ACUITY_SCORE: 66
DRESS: INDEPENDENT
ADLS_ACUITY_SCORE: 66
ADLS_ACUITY_SCORE: 66
ORAL_HYGIENE: INDEPENDENT
ORAL_HYGIENE: INDEPENDENT
ADLS_ACUITY_SCORE: 66
HYGIENE/GROOMING: INDEPENDENT
ADLS_ACUITY_SCORE: 66

## 2024-12-23 NOTE — PROVIDER NOTIFICATION
12/23/24 0947   Individualization/Patient Specific Goals   Patient Personal Strengths resourceful;resilient;positive vocational history;ability to maintain sobriety   Patient Vulnerabilities housing insecurity;lacks insight into illness;poor impulse control   Anxieties, Fears or Concerns None   Interprofessional Rounds   Summary Discussed patient progress and new Memory Care referral. Pt continues to await placement.   Participants nursing;CTC;psychiatrist;other (see comments)   Behavioral Team Discussion   Participants Dr. Smith,Dr. Juarez, Catherine Hammond RN; Jolie Holden MA.LP   Progress Patient has remained fairly stable.  Has been compliant with meds, cares.  No behavioral issues   Anticipated length of stay 7-10 days   Continued Stay Criteria/Rationale Medication management; symptom stabilization; care coordination, placement   Medical/Physical See H&P   Precautions See below   Plan Psychiatric assessment/Medication management. Therapeutic Milieu. Individual care planning and after care planning. Patient to participate in unit groups and activities. Individual and group support on unit.    Family/CTC working on Memory Care placement- currently under consideration at a facilty   Rationale for change in precautions or plan No change in plan/precautions   Safety Plan Completed by unit therapist   Anticipated Discharge Disposition another healthcare facility     Goal Outcome Evaluation:

## 2024-12-23 NOTE — PLAN OF CARE
Problem: Sleep Disturbance  Goal: Adequate Sleep/Rest  Outcome: Progressing   Patient sleeps on and off. Patient   Complained of R hip pain-had Tylenol and ice pack applied-effective per patient's report. Patient stated pain is getting better. Lidocaine patch on the R hip. Patient continues on q15 minutes safety checks. Patient on SIO at night only. Patient mood labile, agitated at times but easy to redirect. Patient has BLE +3 pitting edema- BLE elevated on a pillows. Will continue with current plan of care. Notify MD with any concerns. The patient had 3.75  total hours of sleep this shift.

## 2024-12-23 NOTE — PLAN OF CARE
BEH IP Unit Acuity Rating Score (UARS)  Patient is given one point for every criteria they meet.    CRITERIA SCORING   On a 72 hour hold, court hold, committed, stay of commitment, or revocation. 1    Patient LOS on BEH unit exceeds 20 days. 1  LOS: 72   Patient under guardianship, 55+, otherwise medically complex, or under age 11. 1   Suicide ideation without relief of precipitating factors. 0   Current plan for suicide. 0   Current plan for homicide. 0   Imminent risk or actual attempt to seriously harm another without relief of factors precipitating the attempt. 1   Severe dysfunction in daily living (ex: complete neglect for self care, extreme disruption in vegetative function, extreme deterioration in social interactions). 1   Recent (last 7 days) or current physical aggression in the ED or on unit. 0   Restraints or seclusion episode in past 72 hours. 0   Recent (last 7 days) or current verbal aggression, agitation, yelling, etc., while in the ED or unit. 0   Active psychosis. 1   Need for constant or near constant redirection (from leaving, from others, etc).  0   Intrusive or disruptive behaviors. 1   Patient requires 3 or more hours of individualized nursing care per 8-hour shift (i.e. for ADLs, meds, therapeutic interventions). 1   TOTAL 8

## 2024-12-23 NOTE — PLAN OF CARE
"  Problem: Psychotic Signs/Symptoms  Goal: Improved Sleep (Psychotic Signs/Symptoms)  Outcome: Not Progressing     Problem: Adult Behavioral Health Plan of Care  Goal: Adheres to Safety Considerations for Self and Others  Outcome: Progressing     Problem: Depression  Goal: Improved Mood  Outcome: Progressing     Problem: Suicidal Behavior  Goal: Suicidal Behavior is Absent or Managed  Outcome: Progressing   Goal Outcome Evaluation:    Pt is agitated this morning, and ranted about how he feels.. He was perseverating on his master tom card, and was asking for it . He is angry and yelling  about being \"stuck here in a rotten hole, wants his car to drive in and out of here, needing to get out and about\". Pt talked to one of the RNs, who he tried to reason with, saying, I had been good, he also tried telling her that she should be on his side\". After ventilating how he feels, pt now sitting quietly  in front of the TV, eating his candy.  Pt approached the nurses' desk few more times,asking for his belongings and cards. He then goes back to his chair at the [facility].   Pt is eating and drinking adequately. He is medication compliant.       "

## 2024-12-23 NOTE — PLAN OF CARE
Brief Coverage Note  Patient met with LESLY Carolina from [facility] this AM.  Per  Rocío GRACE, meeting went well.  Amalia requested last few days meds records be sent, which they were..    Writer will follow up tomorrow on whether patient has been accepted.

## 2024-12-23 NOTE — PROGRESS NOTES
"  ----------------------------------------------------------------------------------------------------------  [facility]  Psychiatry Progress Note  Hospital Day #72     Interim History:     The patient's care was discussed with the treatment team and chart notes were reviewed.    Identifier: Estevan Aaron is a 64 year old male with previous psychiatric diagnoses of  generalized anxiety disorder, Neurocognitive disorder, admitted from the ED 10/12/2024 due to concern for HI and psychosis in the context of medical issues (hyperthyroidism, hypercalcemia) psychosocial stressors including family dynamics with recent possible divorce.    Vitals: VSS  Sleep: 3.75 hours (12/23/24 0600)  Scheduled medications: Took all scheduled medications as prescribed  Psychiatric PRN medications:   Last 24H PRN:     acetaminophen (TYLENOL) tablet 650 mg, 650 mg at 12/23/24 0249    diclofenac (VOLTAREN) 1 % topical gel 2 g, 2 g at 12/22/24 2032    Lidocaine (LIDOCARE) 4 % Patch 1 patch, 1 patch at 12/22/24 2023      Staff Report:   Throughout the weekend Santos had R hip pain-had Tylenol and ice pack applied-effective per patient's report. Patient stated pain is getting better. Lidocaine patch on the R hip. Patient mood labile, agitated at times but easy to redirect. Patient has BLE +3 pitting edema- BLE elevated on a pillows.     See staff notes for more information     Subjective:     Patient Interview:  Santos was seen in the common room. He says he feels \"all right\" he says that the writer must give him a signed \"card\" which would allow him to leave the unit.  Santos was informed that was not accurate. Throughout the interview Gael was oriented to self only.  He was impatient with questions, we decided to follow up with him later if he has any needs, he is agreeable to it.    ROS:  See above     Objective:     Vitals:  /73 (BP Location: Left arm, Patient Position: Sitting, Cuff Size: Adult " Large)   Pulse 58   Temp 97.8  F (36.6  C) (Oral)   Resp 16   Wt 110.6 kg (243 lb 12.8 oz)   SpO2 96%   BMI 39.35 kg/m      Allergies:  No Known Allergies    Current Medications:  Scheduled:  Current Facility-Administered Medications   Medication Dose Route Frequency Provider Last Rate Last Admin    acetaminophen (TYLENOL) tablet 650 mg  650 mg Oral Q4H PRN Nikki Caceres MD   650 mg at 12/23/24 0249    alum & mag hydroxide-simethicone (MAALOX) suspension 30 mL  30 mL Oral Q4H PRN Nikki Caceres MD   30 mL at 10/17/24 0837    amLODIPine (NORVASC) tablet 10 mg  10 mg Oral Daily Presley Barrera MD   10 mg at 12/22/24 0816    atorvastatin (LIPITOR) tablet 40 mg  40 mg Oral Daily Nikki Caceres MD   40 mg at 12/22/24 0816    cholecalciferol (VITAMIN D3) capsule 1,250 mcg  1,250 mcg Oral Q7 Days Evelia Grimm DO   1,250 mcg at 12/17/24 1234    cinacalcet (SENSIPAR) tablet 30 mg  30 mg Oral Daily Presley Barrera MD   30 mg at 12/22/24 0816    cloNIDine (CATAPRES) tablet 0.1 mg  0.1 mg Oral BID Presley Barrera MD   0.1 mg at 12/22/24 2020    diclofenac (VOLTAREN) 1 % topical gel 2 g  2 g Topical 4x Daily PRN Rocío Lopez MD   2 g at 12/22/24 2032    furosemide (LASIX) tablet 40 mg  40 mg Oral Daily Presley Barrera MD   40 mg at 12/22/24 0817    gabapentin (NEURONTIN) capsule 100 mg  100 mg Oral Q6H PRN Nikki Caceres MD   100 mg at 11/15/24 0418    hydrocortisone (CORTAID) 0.5 % cream   Topical Daily PRN Savi Chamorro MD        Lidocaine (LIDOCARE) 4 % Patch 1 patch  1 patch Transdermal Q24H PRN Rocío Lopez MD   1 patch at 12/22/24 2023    lisinopril (ZESTRIL) tablet 40 mg  40 mg Oral Daily Presley Barrera MD   40 mg at 12/22/24 0816    melatonin tablet 3 mg  3 mg Oral At Bedtime Presley Barrera MD   3 mg at 12/22/24 2021    metoprolol succinate ER (TOPROL XL) 24 hr tablet 50 mg  50 mg Oral Daily Presley Barrera MD   50 mg at 12/22/24 0817     nicotine (NICODERM CQ) 14 MG/24HR 24 hr patch 1 patch  1 patch Transdermal Daily Evelia Grimm DO   1 patch at 12/20/24 0837    nicotine (NICORETTE) gum 2 mg  2 mg Buccal Q1H PRN González Garcia MD   2 mg at 10/24/24 1254    OLANZapine zydis (zyPREXA) ODT tab 5 mg  5 mg Oral At Bedtime González Garcia MD   5 mg at 12/22/24 2021    Or    OLANZapine (zyPREXA) injection 10 mg  10 mg Intramuscular At Bedtime González Garcia MD        OLANZapine (zyPREXA) tablet 5 mg  5 mg Oral TID PRN Evelia Grimm DO   5 mg at 11/24/24 0436    Or    OLANZapine (zyPREXA) injection 5 mg  5 mg Intramuscular TID PRN Evelia Grimm DO        polyethylene glycol (MIRALAX) Packet 17 g  17 g Oral Daily PRN Nikki Caceres MD        traZODone (DESYREL) half-tab 25 mg  25 mg Oral At Bedtime Rocío Lopez MD   25 mg at 12/22/24 2100    traZODone (DESYREL) half-tab 25 mg  25 mg Oral At Bedtime PRN Evelia Grimm DO   25 mg at 12/17/24 0332       PRN:  Current Facility-Administered Medications   Medication Dose Route Frequency Provider Last Rate Last Admin    acetaminophen (TYLENOL) tablet 650 mg  650 mg Oral Q4H PRN Nikki Caceres MD   650 mg at 12/23/24 0249    alum & mag hydroxide-simethicone (MAALOX) suspension 30 mL  30 mL Oral Q4H PRN Nikki Caceres MD   30 mL at 10/17/24 0837    amLODIPine (NORVASC) tablet 10 mg  10 mg Oral Daily Presley Barrera MD   10 mg at 12/22/24 0816    atorvastatin (LIPITOR) tablet 40 mg  40 mg Oral Daily Nikki Caceres MD   40 mg at 12/22/24 0816    cholecalciferol (VITAMIN D3) capsule 1,250 mcg  1,250 mcg Oral Q7 Days Evelia Grimm DO   1,250 mcg at 12/17/24 1234    cinacalcet (SENSIPAR) tablet 30 mg  30 mg Oral Daily Presley Barrera MD   30 mg at 12/22/24 0816    cloNIDine (CATAPRES) tablet 0.1 mg  0.1 mg Oral BID Presley Barrera MD   0.1 mg at 12/22/24 2020    diclofenac (VOLTAREN) 1 % topical gel 2 g  2 g Topical 4x Daily PRN Rocío Lopez MD   2 g at  12/22/24 2032    furosemide (LASIX) tablet 40 mg  40 mg Oral Daily Presley Barrera MD   40 mg at 12/22/24 0817    gabapentin (NEURONTIN) capsule 100 mg  100 mg Oral Q6H PRN Nikki Caceres MD   100 mg at 11/15/24 0418    hydrocortisone (CORTAID) 0.5 % cream   Topical Daily PRN Savi Chamorro MD        Lidocaine (LIDOCARE) 4 % Patch 1 patch  1 patch Transdermal Q24H PRN Rocío Lopez MD   1 patch at 12/22/24 2023    lisinopril (ZESTRIL) tablet 40 mg  40 mg Oral Daily Presley Barrera MD   40 mg at 12/22/24 0816    melatonin tablet 3 mg  3 mg Oral At Bedtime Presley Barrera MD   3 mg at 12/22/24 2021    metoprolol succinate ER (TOPROL XL) 24 hr tablet 50 mg  50 mg Oral Daily Presley Barrera MD   50 mg at 12/22/24 0817    nicotine (NICODERM CQ) 14 MG/24HR 24 hr patch 1 patch  1 patch Transdermal Daily Evelia Grimm DO   1 patch at 12/20/24 0837    nicotine (NICORETTE) gum 2 mg  2 mg Buccal Q1H PRN González Garcia MD   2 mg at 10/24/24 1254    OLANZapine zydis (zyPREXA) ODT tab 5 mg  5 mg Oral At Bedtime González Garcia MD   5 mg at 12/22/24 2021    Or    OLANZapine (zyPREXA) injection 10 mg  10 mg Intramuscular At Bedtime González Garcia MD        OLANZapine (zyPREXA) tablet 5 mg  5 mg Oral TID PRN Evelia Grimm DO   5 mg at 11/24/24 0436    Or    OLANZapine (zyPREXA) injection 5 mg  5 mg Intramuscular TID PRN Evelia Grimm DO        polyethylene glycol (MIRALAX) Packet 17 g  17 g Oral Daily PRN Nikki Caceres MD        traZODone (DESYREL) half-tab 25 mg  25 mg Oral At Bedtime Rocío Lopez MD   25 mg at 12/22/24 2100    traZODone (DESYREL) half-tab 25 mg  25 mg Oral At Bedtime PRN Evelia Grimm, DO   25 mg at 12/17/24 0332     Labs and Imaging:  Data this admission:  - CBC unremarkable  - CMP unremarkable  - TSH normal  - UDS negative  - Vit D low  - Hgb A1c 5.9 (10/13/24)  - Lipids unremarkable  - Vit B12 normal  - Folate normal  - Urinalysis unremarkable  -  "EKG normal sinus rhythm, QTc 390 ms  - Head CT showed no acute changes  - HIV non reactive  - Treponema antibody non reactive  - ESR wnl   - Ceruloplasmin wnl   - BOOGIE negative  - Lyme negative      Parathyroid hormone:   10/13: 85     Calcium:  10/14: 11.4  10/16: 10.3  10/17: 10.3  10/19: 9.8  10/21: 10.6  10/23: 10.3     Albumin:  10/14: 4.3  10/16: 4.3  10/17: 4.1  10/19: 4.2  10/21: 4.5  10/23: 4.3      CXR:   Impression:   1. No definite radiographic evidence of asbestosis. If clinically  indicated, consider evaluation with high resolution chest CT.  2. Nonspecific left costophrenic blunting. Given symmetrically low  lung volumes may be related to poor inspiratory effort/timing.  3. Pulmonary vascular congestion.     MRI:   IMPRESSION:  1. No acute intracranial pathology.  2. No focal lesion or structural abnormality.   3. Symmetric frontoparietal cortical volume loss slightly more than  expected for age.     Mental Status Exam:   Oriented to: Oriented to self only  General:  Awake and Alert  Appearance:  appears stated age, Grooming is adequate, and Dressed in his own clothes  Behavior/Attitude:  Disengaged, Easy to redirect, Easily distracted, and sleepy  Eye Contact: Downcast distracted  Psychomotor: No evidence of tics, dystonia, or tardive dyskinesia  no catatonia present  Speech:  appropriate volume/tone and slurred due to being sleepy  Language: Fluent in English with appropriate syntax and vocabulary.  Mood:  \"have pain in my hip\"  Affect:  confused  Thought Process:  tangential and confused  Thought Content:   did not assess SI/HI/ delusion of confusion . Patient denies paranoia  Associations:  loose  Insight:  impaired   Judgment:  impaired   Impulse control: limited  Attention Span:   mildly decreased  Concentration:   distractible  Recent and Remote Memory:   remote memory intact, recent memory impaired  Fund of Knowledge: average  Muscle Strength and Tone: normal  Gait and Station: Normal     " Psychiatric Assessment     Estevan Aaron is a 64 year old male with previous psychiatric diagnoses of GEORGINA admitted from the ER on 10/12/2024 due to concern for HI and psychosis in the context of medical issues (hyperthyroidism, hypercalcemia), psychosocial stressors including with recent divorce and selling his home. This is the patient's first psychiatric hospitalization. Significant symptoms on admission included delusions of persecution with grandiose beliefs, homicidal ideations, as well as disorganized thinking and behavior. The MSE on admission was pertinent for a thought process which was perseverative, circumstantial, tangential, disorganized and tangential; with rambling and looseness of associations. Psychological contributions to presentation included lack of insight. Social factors contributing to presentation included isolation, recent divorce, selling his house, and moving from hotel to hotel. Biological contributions to presentation included a history of hyperparathyroidism with chronic hypercalcemia per charts as well as a history of methamphetamine use per collateral from ex-wife.     History was difficult to obtain due to the patient's severe disorganized thinking on interview; he was observed with persecutory delusions pertaining to the realtor and gang members, disorganized behavior as these delusions have led him to flee to different hotels to stay safe/ has called  complaining of being targeted and auditory hallucinations of voices for the past 12 months. Per collateral from ex-wife, Santos has had paranoid ideations since they first met. He has always felt like people are out to get him or trying to rip him off. She says that he has had visual hallucinations (he will point things out that the rest of the family can't see) for a long time, but the family would just go along with him to avoid making him angry. This timeline and presentation could be consistent with diagnosis of paranoid  personality disorder. Things began to worsen around the beginning of the COVID pandemic, when Santos became more isolated and the family started to notice cognitive issues such as impaired memory. Immediately prior to this hospitalization, the ex-wife found Santos in a hotel room with a generator full of gasoline, binoculars, and hunting equipment. This time course does not suggest acute psychosis, however given the patient has never had a full psychiatric work-up, we completed a first-episode psychosis work-up.      Other things that could be contributing to his presentation is hyperparathyroidism with hypercalcemia. However, patient's calcium returned to normal limits on 10/16, and patient continues to have psychosis so likely not a significant contributing factor to patient's presentation. Still we will assess with new labs tomorrow.     Santos is still disoriented and his confused behavior tend to increase in the evenings, this seems to be related to current Neurocognitive Disorder diagnosed, this could evolved to be his new baseline. Patient currently with no more improvement of bizarre behaviors with current neuroleptic dose, patient probably wont benefit from any modification in current therapy. Still patient did not present with any new episodes of physical agitation and is able to attend groups and interact with peers without major altercations.     Goal of treatment: Today interview with new memory care placement.         Psychiatric Plan by Diagnosis   Today's changes:  - none    # Major Neurocognitive Disorder  # Rule out paranoid personality disorder  1. Medications:  - Olanzapine 5 mg at bedtime  - Neurology consult 11/8/24, recommend outpatient follow-up and neuropsychiatric testing     2. Pertinent Labs/Monitoring:   - Re-eval Calcium parameters tomorrow     3. Additional Plans:  - Patient will be treated in therapeutic milieu with appropriate individual and group therapies as described  - Patient is  Commitment with [facility]  - [facility] meds: Haldol, Clozaril, Risperdal, Invega, Zyprexa, Seroquel, Abilify  - 12/12/24: Psych team had phone call meeting with Memory Care facility personnel  - Pending responses primary from [facility] and [facility]  - MNChoice Assessment scheduled on 12/31 at 10:00am with Amanda Beard.       # Unspecified anxiety vs Generalized Anxiety Disorder  - Monitor for symptoms.  - Fluoxetine held due to suspicion of ongoing manic symptoms     Psychiatric Hospital Course:      Estevan Aaron was admitted to [facility] on a 72 hour hold.   Medications:  PTA fluoxetine was held due to concern for worsening of zelalem   New medications started at the time of admission include Zyprexa.   Increased olanzapine 10 mg at bedtime was to 10 mg BID (10/14)   Increased olanzapine 10 mg BID to 10 mg during day and 15 mg at bedtime (10/15)  10/21: increased olanzapine pm dose from 15 to 20 mg  10/23: started melatonin 3 mg at 7 pm to help patient with circadian rhythm as he has been staying up throughout the night and sleeping a lot during the day and there is some concern for delirium   10/24: consulted anesthesia for MRI brain   10/28: MRI brain complete, decrease AM olanzapine from 10 to 5 mg  10/29: Morning olanzapine decreased to 2.5 mg, evening olanzapine decreased to 15 mg   11/1: Decreased evening olanzapine to 10 mg   11/4: Decreased evening olanzapine to 7.5 mg   11/5: Decreased evening olanzapine to 5 mg   11/11: Moved morning dose of olanzapine to the afternoon as patient appears more agitated in the afternoons/evenings  11/21: Started memantine 5 mg daily   11/26: Discontinued olanzapine 2.5 mg during the afternoon  12/2:   Discontinued memantine 5 mg, daily     Care conference 10/18/24 with daughter Maria C and ex-wife Clifford  - Report that patient has always been paranoid since ex-wife first met him. She describes him always feeling like he is getting ripped off.   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 They reported once incidence in which he made a bid on a job and the client paid him, but he never ended up finishing the job  - Wife and him  officially this year, and since selling the house several months ago, patient has been moving from hotel to hotel due to thinking people are out to get him   - When they were selling their house, patient threatened realtors and felt he was being ripped off   - Clifford reports that she started to be afraid to be around him alone  - When he was found in the hotel, he had a generator full of gasoline, binoculars, bullets, and hunting equipment.     10/21/24: updated daughter on Santos's treatment plan      10/23/24: updated daughter on Santos's treatment plan      10/25/24: updated daughter on Santos's treatment plan, including plan to try and get MRI brain done under sedation. She said that Santos's grandfather had dementia and his sister has a brain tumor.      10/28/24: updated daughter on Santos's MRI results. She is planning on coming into town soon.      Care conference 11/1/24 with daughters Maria C and Estefani and ex-wife Clifford  - Discussed dementia diagnosis   - Clifford asked about plans moving forward. Explained that applying for medical assistance is the first step. Explained that application processing time can be very variable. Informed family that Santos will most likely be staying on this inpatient unit during this time.   - Clifford expressed that their family would like to be the power of /have  conservatorship for Santos.   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 Reports that he has bills that he has not paid.   - Maria C is planning to move to [place], and Clifford currently lives in [place]. Family prefer that Santos would in a facility that are near these locations. Discussed with family that they can also try to request a specific location (memory care).   - Clifford reports that he had previously gotten help applying for his social security and medicare, but unsure if it had been approved.   - Informed family that there is a geriatric unit and there might be a transfer if there becomes availability on this unit.   - Family shared that he was completely different just two months ago.   - Estefani shared that his family found his medications in his old truck recently, expressed that it was likely that he was not taking his medications prior to his hospitalization.      11/6/24: updated Maria C on plan to try and transfer Santos to Geriatric unit.      11/11/24: updated Maria C on neurology consult      The risks, benefits, alternatives, and side effects were discussed and understood by the patient and other caregivers.     Medical Assessment and Plan     Medical diagnoses to be addressed this admission:    # Hip Pain  - New right hip pain, and mild pain in multiple joints. Moderate response to topical antiinflammatory gel and lidocaine patch.   - Medicine consulted for recommendations 12/20    # CHF  # Hypertension  - Continue PTA medications  Furosemide 40 mg daily  Lisinopril 40 mg daily  Metoprolol 50 mg daily  Amlodipine 10 mg daily  Clonidine 0.1 mg BID  - Pitting edema in lower extremities, not painful 3+: Medicine consulted for recommendations 12/20    # Hyperlipidemia  - Continue PTA Atorvastatin 40 mg     # Primary Hyperparathyroidism  # Hypercalcemia, hypophosphoremia   Increased Ca level to 10.9 and decreased Ph level to 2.3 in the ED.  Suspicion patient's hypercalcemia could be contributing to symptoms of psychosis.  - Consulted endocrinology, who started cinacalcet 30 mg BID on 10/13  - 10/16 endocrinology recommended continuing to trend calcium and albumin to make sure patient does not become hypocalcemic and recommended holding cinacalcet   - 10/17, calcium and albumin wnl, endo recommended cinacalcet 30 mg once daily   - 10/21, per endo continue cincaclcet and recheck calcium and albumin on October 24  - 10/23: per endo, patient needs to follow up outpatient for further management of hyperparathyroidism      Medical course: Patient was physically examined by the ED prior to being transferred to the unit and was found to be medically stable and appropriate for admission.      Consults: Psychiatry, Endocrinology (follow-up of hyperthyroidism / hypercalcemia and hypertension), neurology     Checklist     Legal Status: Committed   MI Commitment with [facility]  File Number: [id number]  Start and expiration date of commitment: 10/24/24 - 04/24/25     [facility] meds: Haldol, Clozaril, Risperdal, Invega, Zyprexa, Seroquel, Abilify     PPS/CM:  Shelby Chowdary: [phone]  [email or web address].    Guardian/Conservator:  Clifford Aaron is currently emergency until 01/20/2025.       Safety Assessment:   Behavioral Orders   Procedures    Code 1 - Restrict to Unit    Routine Programming     As clinically indicated    Status 15     Every 15 minutes.    Status Individual Observation     SIO AT NIGHT     Patient SIO status reviewed with team/RN.  Please also refer to RN/team documentation for add'l detail.    -SIO staff to monitor following which have contributed to patient being on SIO:  Disruptive behavior at night     -When following observed, team will review discontinuation of SIO:  Less disruptive and redirectable at night     Order Specific Question:   CONTINUOUS 24 hours / day     Answer:   Other     Order Specific Question:    Specify distance     Answer:   10 ft     Order Specific Question:   Indications for SIO     Answer:   Severe intrusiveness       Risk Assessment:  Risk for harm is low  Risk factors: impulsive and past behaviors  Protective factors: family      SIO: 1:1 at nights    Disposition: Pending stabilization, medication optimization, & development of a safe discharge plan.     Attestations     Resident without student: This patient was seen and discussed with my attending physician.    Rocío Orellana MD  [facility] [employer] Psychiatry Resident  12/23/2024    Attestation:  This patient has been seen and evaluated by me, Pete Smith MD.  I have discussed this patient with the house staff team including the resident and/or medical student and I agree with the findings and plan in this note.    I have reviewed today's vital signs, medications, labs and imaging. Pete Smith MD , PhD.

## 2024-12-24 PROCEDURE — 250N000013 HC RX MED GY IP 250 OP 250 PS 637

## 2024-12-24 PROCEDURE — 124N000002 HC R&B MH UMMC

## 2024-12-24 RX ADMIN — ATORVASTATIN CALCIUM 40 MG: 20 TABLET, FILM COATED ORAL at 08:40

## 2024-12-24 RX ADMIN — CLONIDINE HYDROCHLORIDE 0.1 MG: 0.1 TABLET ORAL at 21:37

## 2024-12-24 RX ADMIN — ACETAMINOPHEN 650 MG: 325 TABLET, FILM COATED ORAL at 03:50

## 2024-12-24 RX ADMIN — METOPROLOL SUCCINATE 50 MG: 50 TABLET, EXTENDED RELEASE ORAL at 08:41

## 2024-12-24 RX ADMIN — Medication 1 PATCH: at 08:47

## 2024-12-24 RX ADMIN — AMLODIPINE BESYLATE 10 MG: 5 TABLET ORAL at 08:40

## 2024-12-24 RX ADMIN — FUROSEMIDE 40 MG: 40 TABLET ORAL at 08:41

## 2024-12-24 RX ADMIN — MELATONIN TAB 3 MG 3 MG: 3 TAB at 21:37

## 2024-12-24 RX ADMIN — Medication 1250 MCG: at 12:52

## 2024-12-24 RX ADMIN — CINACALCET 30 MG: 30 TABLET ORAL at 08:40

## 2024-12-24 RX ADMIN — CLONIDINE HYDROCHLORIDE 0.1 MG: 0.1 TABLET ORAL at 08:41

## 2024-12-24 RX ADMIN — Medication 25 MG: at 00:46

## 2024-12-24 RX ADMIN — Medication 25 MG: at 21:38

## 2024-12-24 RX ADMIN — OLANZAPINE 5 MG: 5 TABLET, ORALLY DISINTEGRATING ORAL at 21:38

## 2024-12-24 RX ADMIN — LISINOPRIL 40 MG: 10 TABLET ORAL at 08:40

## 2024-12-24 RX ADMIN — GABAPENTIN 100 MG: 100 CAPSULE ORAL at 00:46

## 2024-12-24 ASSESSMENT — ACTIVITIES OF DAILY LIVING (ADL)
ADLS_ACUITY_SCORE: 66
ORAL_HYGIENE: INDEPENDENT
ADLS_ACUITY_SCORE: 66
HYGIENE/GROOMING: INDEPENDENT
ADLS_ACUITY_SCORE: 66
DRESS: STREET CLOTHES;INDEPENDENT
ADLS_ACUITY_SCORE: 66
HYGIENE/GROOMING: INDEPENDENT
ADLS_ACUITY_SCORE: 66
DRESS: INDEPENDENT
ADLS_ACUITY_SCORE: 66
LAUNDRY: WITH SUPERVISION
ADLS_ACUITY_SCORE: 66
ORAL_HYGIENE: INDEPENDENT
ADLS_ACUITY_SCORE: 66
ADLS_ACUITY_SCORE: 66

## 2024-12-24 NOTE — PLAN OF CARE
Problem: Psychotic Signs/Symptoms  Goal: Improved Behavioral Control (Psychotic Signs/Symptoms)  Outcome: Not Progressing   Goal Outcome Evaluation:    Patient out at 00:30 started ramping about getting his medications and vitals checked, he told nurse to shout up when he was talking and became hyper verbal disrupting the Unit. Patient was redirected by 3 staffs, he took PRN Gabapentin, second dose of  Trazodone 25 mg and Tylenol. Patient slept for 5 hrs sitting in chair in the lounge.

## 2024-12-24 NOTE — PLAN OF CARE
"  Problem: Adult Behavioral Health Plan of Care  Goal: Optimized Coping Skills in Response to Life Stressors  Outcome: Not Progressing     Problem: Depression  Goal: Improved Mood  Outcome: Not Progressing     Problem: Adult Behavioral Health Plan of Care  Goal: Patient-Specific Goal (Individualization)  Description: You can add care plan individualizations to a care plan. Examples of Individualization might be:  \"Parent requests to be called daily at 9am for status\", \"I have a hard time hearing out of my right ear\", or \"Do not touch me to wake me up as it startles  me\".  Outcome: Progressing  Goal: Adheres to Safety Considerations for Self and Others  Outcome: Progressing  Intervention: Develop and Maintain Individualized Safety Plan  Recent Flowsheet Documentation  Taken 12/24/2024 1100 by Rocío Curry, RN  Safety Measures:   environmental rounds completed   safety rounds completed  Intervention: Develop and Maintain Individualized Safety Plan  Recent Flowsheet Documentation  Taken 12/24/2024 1100 by Rocío Curry, RN  Safety Measures:   environmental rounds completed   safety rounds completed     Problem: Suicidal Behavior  Goal: Suicidal Behavior is Absent or Managed  Outcome: Progressing   Goal Outcome Evaluation:    Plan of Care Reviewed With: patient      Pt is visible in the milieu, sitting by the lounge area. Pt snoozed a few times while sitting in front of the TV. Pt nutrition and hydration is adequate. Pt is medication compliant. Pt again presented with same mood as that of yesterday morning, demanded to have his belongings from the locker because he wanted to go home. He was argumentative and suspicious with staff, saying that he is being robbed of his belongings and that staff tells him lies.  Pt had been hard to redirect, but eventually got distracted with some sports on TV.   In the afternoon, pt expressed disappointment about his situation . He is unable to  verbalize his " thoughts clearly, appearing to be tangential/illogical in speech. Pt does not join groups despite encouragement. Interaction with peers are short  single word comments.

## 2024-12-24 NOTE — PLAN OF CARE
"  Problem: Psychotic Signs/Symptoms  Goal: Improved Behavioral Control (Psychotic Signs/Symptoms)  Outcome: Progressing     Problem: Sleep Disturbance  Goal: Adequate Sleep/Rest  Outcome: Progressing     Problem: Pain Acute  Goal: Optimal Pain Control and Function  Outcome: Progressing  Intervention: Prevent or Manage Pain  Recent Flowsheet Documentation  Taken 12/24/2024 1653 by Tamie Fraser RN  Sensory Stimulation Regulation: quiet environment promoted     Problem: Manic or Hypomanic Signs/Symptoms  Goal: Improved Impulse Control (Manic/Hypomanic Signs/Symptoms)  Outcome: Progressing  Intervention: Promote Behavior and Impulse Control  Recent Flowsheet Documentation  Taken 12/24/2024 1653 by Tamie Frsaer RN  Diversional Activity: television   Goal Outcome Evaluation:    Pt was visible in the lounge watching TV dozing on and off. Affect flat but pleasant on approach. Pt C/O of leg pain but denied intervention. Stated,\"They gave me tylenol already and it is effective\". Pt paced the hallway intermittently and returned to his room. Nutrition and hydration adequate. Pt denies all mental health and psych symptoms. Pt was compliant with medications. No safety concerns. No behavior outburst.  "

## 2024-12-24 NOTE — PLAN OF CARE
BEH IP Unit Acuity Rating Score (UARS)  Patient is given one point for every criteria they meet.    CRITERIA SCORING   On a 72 hour hold, court hold, committed, stay of commitment, or revocation. 1    Patient LOS on BEH unit exceeds 20 days. 1  LOS: 73   Patient under guardianship, 55+, otherwise medically complex, or under age 11. 1   Suicide ideation without relief of precipitating factors. 0   Current plan for suicide. 0   Current plan for homicide. 0   Imminent risk or actual attempt to seriously harm another without relief of factors precipitating the attempt. 1   Severe dysfunction in daily living (ex: complete neglect for self care, extreme disruption in vegetative function, extreme deterioration in social interactions). 1   Recent (last 7 days) or current physical aggression in the ED or on unit. 0   Restraints or seclusion episode in past 72 hours. 0   Recent (last 7 days) or current verbal aggression, agitation, yelling, etc., while in the ED or unit. 0   Active psychosis. 1   Need for constant or near constant redirection (from leaving, from others, etc).  0   Intrusive or disruptive behaviors. 1   Patient requires 3 or more hours of individualized nursing care per 8-hour shift (i.e. for ADLs, meds, therapeutic interventions). 1   TOTAL 8

## 2024-12-24 NOTE — PLAN OF CARE
Problem: Adult Behavioral Health Plan of Care  Goal: Plan of Care Review  Outcome: Not Progressing  Flowsheets  Taken 12/23/2024 1920  Plan of Care Reviewed With: patient  Overall Patient Progress: no change  Patient Agreement with Plan of Care: agrees  Taken 12/23/2024 1654  Patient Agreement with Plan of Care: agrees     Problem: Psychotic Signs/Symptoms  Goal: Improved Mood Symptoms (Psychotic Signs/Symptoms)  Intervention: Optimize Emotion and Mood  Recent Flowsheet Documentation  Taken 12/23/2024 1654 by Torito Cruz RN  Diversional Activity: television   Goal Outcome Evaluation:    Plan of Care Reviewed With: patient Plan of Care Reviewed With: patient    Overall Patient Progress: no changeOverall Patient Progress: no change     Alert, able to communicate needs, intermittently confused. He was argumentative at beginning of the shift but redirectable. Visible in milieu all shift, mostly watched tv. Interactive with peers and staff members. Pleasant, cooperative, and medication compliant. He denied any anxiety or depression, denied suicidal thoughts. ADLs WNL, he denied pain. Food and fluid intake WNL.

## 2024-12-25 PROCEDURE — 250N000013 HC RX MED GY IP 250 OP 250 PS 637

## 2024-12-25 PROCEDURE — 124N000002 HC R&B MH UMMC

## 2024-12-25 RX ADMIN — OLANZAPINE 5 MG: 5 TABLET, ORALLY DISINTEGRATING ORAL at 20:57

## 2024-12-25 RX ADMIN — Medication 1 PATCH: at 09:27

## 2024-12-25 RX ADMIN — ACETAMINOPHEN 650 MG: 325 TABLET, FILM COATED ORAL at 23:24

## 2024-12-25 RX ADMIN — AMLODIPINE BESYLATE 10 MG: 5 TABLET ORAL at 09:14

## 2024-12-25 RX ADMIN — FUROSEMIDE 40 MG: 40 TABLET ORAL at 09:14

## 2024-12-25 RX ADMIN — CINACALCET 30 MG: 30 TABLET ORAL at 09:14

## 2024-12-25 RX ADMIN — METOPROLOL SUCCINATE 50 MG: 50 TABLET, EXTENDED RELEASE ORAL at 09:14

## 2024-12-25 RX ADMIN — ATORVASTATIN CALCIUM 40 MG: 20 TABLET, FILM COATED ORAL at 09:14

## 2024-12-25 RX ADMIN — MELATONIN TAB 3 MG 3 MG: 3 TAB at 20:57

## 2024-12-25 RX ADMIN — CLONIDINE HYDROCHLORIDE 0.1 MG: 0.1 TABLET ORAL at 20:56

## 2024-12-25 RX ADMIN — Medication 25 MG: at 20:59

## 2024-12-25 RX ADMIN — LISINOPRIL 40 MG: 10 TABLET ORAL at 09:14

## 2024-12-25 RX ADMIN — CLONIDINE HYDROCHLORIDE 0.1 MG: 0.1 TABLET ORAL at 09:14

## 2024-12-25 RX ADMIN — ACETAMINOPHEN 650 MG: 325 TABLET, FILM COATED ORAL at 02:32

## 2024-12-25 ASSESSMENT — ACTIVITIES OF DAILY LIVING (ADL)
ADLS_ACUITY_SCORE: 66

## 2024-12-25 NOTE — PLAN OF CARE
Problem: Psychotic Signs/Symptoms  Goal: Improved Behavioral Control (Psychotic Signs/Symptoms)  Outcome: Progressing     Problem: Anxiety  Goal: Anxiety Reduction or Resolution  Outcome: Progressing     Problem: Manic or Hypomanic Signs/Symptoms  Goal: Improved Impulse Control (Manic/Hypomanic Signs/Symptoms)  Outcome: Progressing  Intervention: Promote Behavior and Impulse Control  Recent Flowsheet Documentation  Taken 12/25/2024 0108 by Tamie Fraser RN  Diversional Activity: television   Goal Outcome Evaluation:     Pt was visible in the lounge watching TV. Affect labile. Mood is anxious. Pleasant and cooperative on approach. Lack of insight to his illness. Denies pain and all mental health and psych symptoms. A medical bed was ordered for pt by on call MD. Po intake adequate. Pt was compliant with medications. No safety concerns noted this shift. No behavioral concerns noted this shift. Pt continues on SIO 1:1 for severe intrusive behavior.

## 2024-12-25 NOTE — PLAN OF CARE
"  Problem: Adult Behavioral Health Plan of Care  Goal: Optimized Coping Skills in Response to Life Stressors  Outcome: Not Progressing     Problem: Psychotic Signs/Symptoms  Goal: Improved Behavioral Control (Psychotic Signs/Symptoms)  Outcome: Not Progressing     Problem: Anxiety  Goal: Anxiety Reduction or Resolution  Outcome: Not Progressing     Problem: Adult Behavioral Health Plan of Care  Goal: Adheres to Safety Considerations for Self and Others  Outcome: Progressing  Intervention: Develop and Maintain Individualized Safety Plan  Recent Flowsheet Documentation  Taken 12/25/2024 1400 by Rocío Curry RN  Safety Measures:   environmental rounds completed   safety rounds completed  Intervention: Develop and Maintain Individualized Safety Plan  Recent Flowsheet Documentation  Taken 12/25/2024 1400 by Rocío Curry RN  Safety Measures:   environmental rounds completed   safety rounds completed  Goal: Absence of New-Onset Illness or Injury  Outcome: Progressing  Intervention: Identify and Manage Fall Risk  Recent Flowsheet Documentation  Taken 12/25/2024 1400 by Rocío Curry RN  Safety Measures:   environmental rounds completed   safety rounds completed   Goal Outcome Evaluation:    Plan of Care Reviewed With: patient      Pt is visible in the milieu. Compliant with medications. Adequate food and fluid intake. Pt is irritable this shift, asking to watch football game on the big TV., which was impossible as game was only showing on Netflix. When staff was able to put the game on at OT room, pt was angry. He was not receptive to redirection.He was arguumentative.  Pt eventually agreed to watching football by the OT room. Pt dozed off a few times while watching TV.  Pt complained of his mattress, saying\" If I am going to stay here, I want my mattress changed\". Awaiting to get egg crate mattress from [facility].  In the morning, pt sadly said \"I feel like I am gonna die\". Asked him to " elaborate and he could not. He babbled some illogical  words that did not make sense.

## 2024-12-26 LAB — SARS-COV-2 RNA RESP QL NAA+PROBE: NEGATIVE

## 2024-12-26 PROCEDURE — 250N000013 HC RX MED GY IP 250 OP 250 PS 637

## 2024-12-26 PROCEDURE — 124N000002 HC R&B MH UMMC

## 2024-12-26 PROCEDURE — 87635 SARS-COV-2 COVID-19 AMP PRB: CPT

## 2024-12-26 RX ADMIN — CLONIDINE HYDROCHLORIDE 0.1 MG: 0.1 TABLET ORAL at 08:12

## 2024-12-26 RX ADMIN — OLANZAPINE 5 MG: 5 TABLET, ORALLY DISINTEGRATING ORAL at 21:30

## 2024-12-26 RX ADMIN — CINACALCET 30 MG: 30 TABLET ORAL at 08:13

## 2024-12-26 RX ADMIN — Medication 1 PATCH: at 08:38

## 2024-12-26 RX ADMIN — MELATONIN TAB 3 MG 3 MG: 3 TAB at 21:30

## 2024-12-26 RX ADMIN — LISINOPRIL 40 MG: 10 TABLET ORAL at 08:12

## 2024-12-26 RX ADMIN — FUROSEMIDE 40 MG: 40 TABLET ORAL at 08:13

## 2024-12-26 RX ADMIN — Medication 25 MG: at 21:31

## 2024-12-26 RX ADMIN — METOPROLOL SUCCINATE 50 MG: 50 TABLET, EXTENDED RELEASE ORAL at 08:12

## 2024-12-26 RX ADMIN — ATORVASTATIN CALCIUM 40 MG: 20 TABLET, FILM COATED ORAL at 08:13

## 2024-12-26 RX ADMIN — CLONIDINE HYDROCHLORIDE 0.1 MG: 0.1 TABLET ORAL at 21:29

## 2024-12-26 RX ADMIN — AMLODIPINE BESYLATE 10 MG: 5 TABLET ORAL at 08:12

## 2024-12-26 ASSESSMENT — ACTIVITIES OF DAILY LIVING (ADL)
ADLS_ACUITY_SCORE: 66
HYGIENE/GROOMING: INDEPENDENT
ADLS_ACUITY_SCORE: 66
LAUNDRY: WITH SUPERVISION
ADLS_ACUITY_SCORE: 66
ORAL_HYGIENE: INDEPENDENT;PROMPTS
ADLS_ACUITY_SCORE: 66
HYGIENE/GROOMING: HANDWASHING;SHOWER;INDEPENDENT
ADLS_ACUITY_SCORE: 66
DRESS: STREET CLOTHES;INDEPENDENT
DRESS: STREET CLOTHES;INDEPENDENT
ADLS_ACUITY_SCORE: 66

## 2024-12-26 NOTE — PLAN OF CARE
Goal Outcome Evaluation:    Plan of Care Reviewed With: patient Plan of Care Reviewed With: patient    Overall Patient Progress: improvingOverall Patient Progress: improving         Problem: Adult Behavioral Health Plan of Care  Goal: Plan of Care Review  Outcome: Progressing  Flowsheets  Taken 12/26/2024 1059  Plan of Care Reviewed With: patient  Overall Patient Progress: improving  Patient Agreement with Plan of Care: agrees  Taken 12/26/2024 1000  Patient Agreement with Plan of Care: agrees     Pt had covid test done. Results are negative. Pt was up and visible in the lounge most part of the shift. Pt was intermittently observed getting up and go to other patients rooms. Pt was re directed few times. Pt appears more disorganized today.  Pt is medications complaint. Hygiene appears to be inadequate. Pt was encouraged to shower but he declined. Good intake of foods and fluids. Pt denied all psych symptoms. Endorsed some mild pain and did not require interventions. Blood pressure (!) 159/69, pulse 61, temperature 97.4  F (36.3  C), temperature source Oral, resp. rate 16, weight 110.6 kg (243 lb 12.8 oz), SpO2 96%.  BP elevated, pt on BP medications. Possible discharge on the 30th.

## 2024-12-26 NOTE — PLAN OF CARE
Problem: Psychotic Signs/Symptoms  Goal: Improved Behavioral Control (Psychotic Signs/Symptoms)  Outcome: Progressing  Intervention: Manage Behavior  Recent Flowsheet Documentation  Taken 12/26/2024 1656 by Tamie Fraser RN  De-Escalation Techniques:   1:1 observation initiated   appropriate behavior reinforced   diversional activity encouraged   medication administered   stimulation decreased     Problem: Anxiety  Goal: Anxiety Reduction or Resolution  Outcome: Progressing     Problem: Pain Acute  Goal: Optimal Pain Control and Function  Outcome: Progressing     Problem: Psychotic Signs/Symptoms  Goal: Improved Behavioral Control (Psychotic Signs/Symptoms)  Outcome: Progressing  Intervention: Manage Behavior  Recent Flowsheet Documentation  Taken 12/26/2024 1656 by Tamie Fraser RN  De-Escalation Techniques:   1:1 observation initiated   appropriate behavior reinforced   diversional activity encouraged   medication administered   stimulation decreased   Goal Outcome Evaluation:     Pt was sitting in the lounge watching TV dozing on and off. Affect labile. Mood anxious and restless.  Pt denies pain or discomfort. Denies all mental health and psych symptoms. Food and fluid intake adequate. Hygiene inappropriate. Pt refused to take a shower. He  was cooperative and compliant with medications No safety concerns. No behavioral issues this shift.

## 2024-12-26 NOTE — PLAN OF CARE
BEH IP Unit Acuity Rating Score (UARS)  Patient is given one point for every criteria they meet.    CRITERIA SCORING   On a 72 hour hold, court hold, committed, stay of commitment, or revocation. 1    Patient LOS on BEH unit exceeds 20 days. 1  LOS: 75   Patient under guardianship, 55+, otherwise medically complex, or under age 11. 1   Suicide ideation without relief of precipitating factors. 0   Current plan for suicide. 0   Current plan for homicide. 0   Imminent risk or actual attempt to seriously harm another without relief of factors precipitating the attempt. 1   Severe dysfunction in daily living (ex: complete neglect for self care, extreme disruption in vegetative function, extreme deterioration in social interactions). 1   Recent (last 7 days) or current physical aggression in the ED or on unit. 0   Restraints or seclusion episode in past 72 hours. 0   Recent (last 7 days) or current verbal aggression, agitation, yelling, etc., while in the ED or unit. 0   Active psychosis. 1   Need for constant or near constant redirection (from leaving, from others, etc).  0   Intrusive or disruptive behaviors. 1   Patient requires 3 or more hours of individualized nursing care per 8-hour shift (i.e. for ADLs, meds, therapeutic interventions). 1   TOTAL 8

## 2024-12-26 NOTE — PLAN OF CARE
Team Note Due:  Monday    Assessment/Intervention/Current Symtoms and Care Coordination:  Chart review and met with team, discussed pt progress, symptomology, and response to treatment.  Discussed the discharge plan and any potential impediments to discharge. Clifford Aaron is currently emergency guardian/conservator until 01/20/2025.    Requested update from [facility] to clarify possible plan for discharge next week.    Discharge Plan or Goal:  Memory care facility     Barriers to Discharge:  Patient requires further psychiatric stabilization due to current symptomology, medication management with changes subject to provider, coordination with outside supports, and aftercare planning. Pt is under civil commitment.     Referral Status:    Memory Care facilities currently in process:  [facility]. Tour completed 11/27.  [facility]. Tour completed 11/29.  [facility]. Tour completed 11/30.  [facility] [place] [place]. Admission pending.  Nikki Noel: [email or web address]; Office [phone], Fax [phone]    Memory Care facilities pending family review:  [facility].   [facility] [place].  [facility].  [facility].  [facility].  [facility].    Memory Care facilities declined:  [facility] (private pay only, no EW).  [facility] (private pay only, no EW).  [facility] (no openings).  [facility]. Tour completed 11/29. Records sent 12/2/24. Declined 12/19/24 (don't feel they are an appropriate facility for pt's needs).  Lorraine (): [email or web address]; [phone]  Isatu (director of nursing): [email or web address]     Legal Status:  MI Commitment with [facility]  File Number: [id number]  Start and  expiration date of commitment: 10/24/24 - 04/24/25    [place] meds: Haldol, Clozaril, Risperdal, Invega, Zyprexa, Seroquel, Abilify    PPS/CM:  Shelby Chowdary: [phone]  [email or web address].    Contacts:  Maria C Aaron (Daughter): [phone]   Clifford Aaron (ex-wife): [phone]     No Del Angel (guardianship/conservatorship ): [phone]  [email or web address]     Upcoming Meetings and Dates/Important Information and next steps:  Follow up with family regarding list of Memory Care facilities  Discharge planning when appropriate  Pt does not qualify for MA per financial counselor on 11/8/2024. Pt will likely privately pay for Memory Care until he qualifies for MA/waivers in the future.  MNChoice Assessment scheduled on 12/31 at 10:00am with Amanda Beard.  Call scheduling team if pt is discharged or if this needs to be rescheduled: [phone]     Provisional Discharge and Change of Status needed at discharge

## 2024-12-26 NOTE — PLAN OF CARE
Problem: Sleep Disturbance  Goal: Adequate Sleep/Rest  Outcome: Progressing   Patient sleeps on and off had 5.5 hours of sleep this shift. Patient had Tylenol at beginning of the shift. No other complaints so far. Patient has hospital bed and he likes it. Patient BLE elevated on a pillow, raised foot of the bed. Patient on status individual observation with 1 staffing, and space restriction for safety, to prevent/manage severe intrusiveness, assaultive behavior, to mitigate safety risks.

## 2024-12-27 PROCEDURE — 124N000002 HC R&B MH UMMC

## 2024-12-27 PROCEDURE — 250N000013 HC RX MED GY IP 250 OP 250 PS 637

## 2024-12-27 PROCEDURE — 99231 SBSQ HOSP IP/OBS SF/LOW 25: CPT | Mod: GC | Performed by: PSYCHIATRY & NEUROLOGY

## 2024-12-27 RX ADMIN — CLONIDINE HYDROCHLORIDE 0.1 MG: 0.1 TABLET ORAL at 07:57

## 2024-12-27 RX ADMIN — AMLODIPINE BESYLATE 10 MG: 5 TABLET ORAL at 07:57

## 2024-12-27 RX ADMIN — Medication 1 PATCH: at 07:57

## 2024-12-27 RX ADMIN — LISINOPRIL 40 MG: 10 TABLET ORAL at 07:57

## 2024-12-27 RX ADMIN — MELATONIN TAB 3 MG 3 MG: 3 TAB at 21:51

## 2024-12-27 RX ADMIN — ATORVASTATIN CALCIUM 40 MG: 20 TABLET, FILM COATED ORAL at 07:56

## 2024-12-27 RX ADMIN — FUROSEMIDE 40 MG: 40 TABLET ORAL at 07:57

## 2024-12-27 RX ADMIN — CINACALCET 30 MG: 30 TABLET ORAL at 07:56

## 2024-12-27 RX ADMIN — CLONIDINE HYDROCHLORIDE 0.1 MG: 0.1 TABLET ORAL at 21:51

## 2024-12-27 RX ADMIN — Medication 25 MG: at 21:51

## 2024-12-27 RX ADMIN — OLANZAPINE 5 MG: 5 TABLET, ORALLY DISINTEGRATING ORAL at 21:51

## 2024-12-27 ASSESSMENT — ACTIVITIES OF DAILY LIVING (ADL)
ADLS_ACUITY_SCORE: 66
ADLS_ACUITY_SCORE: 66
HYGIENE/GROOMING: INDEPENDENT;HANDWASHING
ADLS_ACUITY_SCORE: 66
DRESS: STREET CLOTHES
ADLS_ACUITY_SCORE: 66
ORAL_HYGIENE: INDEPENDENT
ADLS_ACUITY_SCORE: 66
DRESS: INDEPENDENT
ADLS_ACUITY_SCORE: 66
HYGIENE/GROOMING: INDEPENDENT
ADLS_ACUITY_SCORE: 66
ORAL_HYGIENE: INDEPENDENT
ADLS_ACUITY_SCORE: 66

## 2024-12-27 NOTE — PLAN OF CARE
Problem: Sleep Disturbance  Goal: Adequate Sleep/Rest  Outcome: Progressing    Pt appears to have slept for 5.5 hours. Pt  was occasionally intrusive, but easily redirectable during shift. He denies pain, anxiety, depression, and SI/SIB. No PRN medications were given. Pt is on 1:1 SIO for severe intrusiveness on night shift only. 15 minutes safety checks were in place. Staff will continue to offer support to pt.

## 2024-12-27 NOTE — PROGRESS NOTES
"  ----------------------------------------------------------------------------------------------------------  [facility]  Psychiatry Progress Note  Hospital Day #76     Interim History:     The patient's care was discussed with the treatment team and chart notes were reviewed.    Identifier: Estevan Aaron is a 64 year old male with previous psychiatric diagnoses of  generalized anxiety disorder, Neurocognitive disorder, admitted from the ED 10/12/2024 due to concern for HI and psychosis in the context of medical issues (hyperthyroidism, hypercalcemia) psychosocial stressors including family dynamics with recent possible divorce.    Sleep: 5.5 hours (12/27/24 0600)  Scheduled medications: Took all scheduled medications as prescribed  Psychiatric PRN medications: None      Staff Report:   Santos was occasionally intrusive, but easily redirectable during shift. At time with some mild pain relieved with Tylenol. Overnight, he denies pain, anxiety, depression, and SI/SIB. No PRN medications were given     See staff notes for more information     Subjective:     Patient Interview:  Santos was seen in the common room. He says he feels \"good\". Throughout the interview Gael was oriented to self only.  He was impatient with questions, we decided to follow up with him later if he has any needs, he is agreeable to it.     ROS:  See above     Objective:     Vitals:  BP (!) 156/76 (BP Location: Left arm, Patient Position: Sitting, Cuff Size: Adult Large)   Pulse 54   Temp 97.3  F (36.3  C) (Temporal)   Resp 16   Wt 110.6 kg (243 lb 12.8 oz)   SpO2 98%   BMI 39.35 kg/m      Allergies:  No Known Allergies    Current Medications:  Scheduled:  Current Facility-Administered Medications   Medication Dose Route Frequency Provider Last Rate Last Admin    acetaminophen (TYLENOL) tablet 650 mg  650 mg Oral Q4H PRN Nikki Caceres MD   650 mg at 12/25/24 3703    alum & mag " hydroxide-simethicone (MAALOX) suspension 30 mL  30 mL Oral Q4H PRN Nikki Caceres MD   30 mL at 10/17/24 0837    amLODIPine (NORVASC) tablet 10 mg  10 mg Oral Daily Presley Barrera MD   10 mg at 12/27/24 0757    atorvastatin (LIPITOR) tablet 40 mg  40 mg Oral Daily Nikki Caceres MD   40 mg at 12/27/24 0756    cholecalciferol (VITAMIN D3) capsule 1,250 mcg  1,250 mcg Oral Q7 Days Evelia Grimm DO   1,250 mcg at 12/24/24 1252    cinacalcet (SENSIPAR) tablet 30 mg  30 mg Oral Daily Presley Barrera MD   30 mg at 12/27/24 0756    cloNIDine (CATAPRES) tablet 0.1 mg  0.1 mg Oral BID Presley Barrera MD   0.1 mg at 12/27/24 0757    diclofenac (VOLTAREN) 1 % topical gel 2 g  2 g Topical 4x Daily PRN Rocío Lopez MD   2 g at 12/22/24 2032    furosemide (LASIX) tablet 40 mg  40 mg Oral Daily Presley Barrera MD   40 mg at 12/27/24 0757    gabapentin (NEURONTIN) capsule 100 mg  100 mg Oral Q6H PRN Nikki Caceres MD   100 mg at 12/24/24 0046    hydrocortisone (CORTAID) 0.5 % cream   Topical Daily PRN Savi Chamorro MD        Lidocaine (LIDOCARE) 4 % Patch 1 patch  1 patch Transdermal Q24H PRN Rocío Lopez MD   1 patch at 12/22/24 2023    lisinopril (ZESTRIL) tablet 40 mg  40 mg Oral Daily Presley Barrera MD   40 mg at 12/27/24 0757    melatonin tablet 3 mg  3 mg Oral At Bedtime Presley Barrera MD   3 mg at 12/26/24 2130    metoprolol succinate ER (TOPROL XL) 24 hr tablet 50 mg  50 mg Oral Daily Presley Barrera MD   50 mg at 12/26/24 0812    nicotine (NICODERM CQ) 14 MG/24HR 24 hr patch 1 patch  1 patch Transdermal Daily Evelia Grimm DO   1 patch at 12/27/24 0757    nicotine (NICORETTE) gum 2 mg  2 mg Buccal Q1H PRN González Garcia MD   2 mg at 10/24/24 1254    OLANZapine zydis (zyPREXA) ODT tab 5 mg  5 mg Oral At Bedtime González Garcia MD   5 mg at 12/26/24 2130    Or    OLANZapine (zyPREXA) injection 10 mg  10 mg Intramuscular At Bedtime González Garcia,  MD        OLANZapine (zyPREXA) tablet 5 mg  5 mg Oral TID PRN Evelia Grimm DO   5 mg at 11/24/24 0436    Or    OLANZapine (zyPREXA) injection 5 mg  5 mg Intramuscular TID PRN Evelia Grimm DO        polyethylene glycol (MIRALAX) Packet 17 g  17 g Oral Daily PRN Nikki Caceres MD        traZODone (DESYREL) half-tab 25 mg  25 mg Oral At Bedtime Rocío Lopez MD   25 mg at 12/26/24 2131    traZODone (DESYREL) half-tab 25 mg  25 mg Oral At Bedtime PRN Evelia Grimm DO   25 mg at 12/24/24 0046       PRN:  Current Facility-Administered Medications   Medication Dose Route Frequency Provider Last Rate Last Admin    acetaminophen (TYLENOL) tablet 650 mg  650 mg Oral Q4H PRN Nikki Caceres MD   650 mg at 12/25/24 2324    alum & mag hydroxide-simethicone (MAALOX) suspension 30 mL  30 mL Oral Q4H PRN Nikki Caceres MD   30 mL at 10/17/24 0837    amLODIPine (NORVASC) tablet 10 mg  10 mg Oral Daily Presley Barrera MD   10 mg at 12/27/24 0757    atorvastatin (LIPITOR) tablet 40 mg  40 mg Oral Daily Nikki Caceres MD   40 mg at 12/27/24 0756    cholecalciferol (VITAMIN D3) capsule 1,250 mcg  1,250 mcg Oral Q7 Days Evelai Grimm DO   1,250 mcg at 12/24/24 1252    cinacalcet (SENSIPAR) tablet 30 mg  30 mg Oral Daily Presley Barrera MD   30 mg at 12/27/24 0756    cloNIDine (CATAPRES) tablet 0.1 mg  0.1 mg Oral BID Presley Barrera MD   0.1 mg at 12/27/24 0757    diclofenac (VOLTAREN) 1 % topical gel 2 g  2 g Topical 4x Daily PRN Rocío Lopez MD   2 g at 12/22/24 2032    furosemide (LASIX) tablet 40 mg  40 mg Oral Daily Presley Barrera MD   40 mg at 12/27/24 0757    gabapentin (NEURONTIN) capsule 100 mg  100 mg Oral Q6H PRN Nikki Caceres MD   100 mg at 12/24/24 0046    hydrocortisone (CORTAID) 0.5 % cream   Topical Daily PRN Savi Chamorro MD        Lidocaine (LIDOCARE) 4 % Patch 1 patch  1 patch Transdermal Q24H PRN Rocío Lopez MD   1 patch  at 12/22/24 2023    lisinopril (ZESTRIL) tablet 40 mg  40 mg Oral Daily Presley Barrera MD   40 mg at 12/27/24 0757    melatonin tablet 3 mg  3 mg Oral At Bedtime Presley Barrera MD   3 mg at 12/26/24 2130    metoprolol succinate ER (TOPROL XL) 24 hr tablet 50 mg  50 mg Oral Daily Presley Barrera MD   50 mg at 12/26/24 0812    nicotine (NICODERM CQ) 14 MG/24HR 24 hr patch 1 patch  1 patch Transdermal Daily Evelia Grimm DO   1 patch at 12/27/24 0757    nicotine (NICORETTE) gum 2 mg  2 mg Buccal Q1H PRN González Garcia MD   2 mg at 10/24/24 1254    OLANZapine zydis (zyPREXA) ODT tab 5 mg  5 mg Oral At Bedtime González Garcia MD   5 mg at 12/26/24 2130    Or    OLANZapine (zyPREXA) injection 10 mg  10 mg Intramuscular At Bedtime González Garcia MD        OLANZapine (zyPREXA) tablet 5 mg  5 mg Oral TID PRN Evelia Grimm DO   5 mg at 11/24/24 0436    Or    OLANZapine (zyPREXA) injection 5 mg  5 mg Intramuscular TID PRN Evelia Grimm DO        polyethylene glycol (MIRALAX) Packet 17 g  17 g Oral Daily PRN Nikki Caceres MD        traZODone (DESYREL) half-tab 25 mg  25 mg Oral At Bedtime Rocío Lopez MD   25 mg at 12/26/24 2131    traZODone (DESYREL) half-tab 25 mg  25 mg Oral At Bedtime PRN Evelia Grimm DO   25 mg at 12/24/24 0046     Labs and Imaging:  Data this admission:  - CBC unremarkable  - CMP unremarkable  - TSH normal  - UDS negative  - Vit D low  - Hgb A1c 5.9 (10/13/24)  - Lipids unremarkable  - Vit B12 normal  - Folate normal  - Urinalysis unremarkable  - EKG normal sinus rhythm, QTc 390 ms  - Head CT showed no acute changes  - HIV non reactive  - Treponema antibody non reactive  - ESR wnl   - Ceruloplasmin wnl   - BOOGIE negative  - Lyme negative      Parathyroid hormone:   10/13: 85     Calcium:  10/14: 11.4  10/16: 10.3  10/17: 10.3  10/19: 9.8  10/21: 10.6  10/23: 10.3     Albumin:  10/14: 4.3  10/16: 4.3  10/17: 4.1  10/19: 4.2  10/21: 4.5  10/23: 4.3      CXR:   Impression:  "  1. No definite radiographic evidence of asbestosis. If clinically  indicated, consider evaluation with high resolution chest CT.  2. Nonspecific left costophrenic blunting. Given symmetrically low  lung volumes may be related to poor inspiratory effort/timing.  3. Pulmonary vascular congestion.     MRI:   IMPRESSION:  1. No acute intracranial pathology.  2. No focal lesion or structural abnormality.   3. Symmetric frontoparietal cortical volume loss slightly more than  expected for age.     Mental Status Exam:   Oriented to: Oriented to self only  General:  Awake and Alert  Appearance:  appears stated age, Grooming is adequate, and Dressed in his own clothes  Behavior/Attitude:  Disengaged, Easy to redirect, and Easily distracted  Eye Contact: Downcast distracted  Psychomotor: No evidence of tics, dystonia, or tardive dyskinesia  no catatonia present  Speech:  appropriate volume/tone, talkative, and spontaneous   Language: Fluent in English with appropriate syntax and vocabulary.  Mood:  \"good, do not want to talk to you\"  Affect:  confused  Thought Process:  tangential and confused  Thought Content:   did not assess SI/HI/ delusion of confusion . Patient denies paranoia  Associations:  loose  Insight:  impaired   Judgment:  impaired   Impulse control: limited  Attention Span:   mildly decreased  Concentration:   distractible  Recent and Remote Memory:   remote memory intact, recent memory impaired  Fund of Knowledge: average  Muscle Strength and Tone: normal  Gait and Station: Normal     Psychiatric Assessment     Estevan Aaron is a 64 year old male with previous psychiatric diagnoses of GEORGINA admitted from the ER on 10/12/2024 due to concern for HI and psychosis in the context of medical issues (hyperthyroidism, hypercalcemia), psychosocial stressors including with recent divorce and selling his home. This is the patient's first psychiatric hospitalization. Significant symptoms on admission included delusions of " persecution with grandiose beliefs, homicidal ideations, as well as disorganized thinking and behavior. The MSE on admission was pertinent for a thought process which was perseverative, circumstantial, tangential, disorganized and tangential; with rambling and looseness of associations. Psychological contributions to presentation included lack of insight. Social factors contributing to presentation included isolation, recent divorce, selling his house, and moving from hotel to hotel. Biological contributions to presentation included a history of hyperparathyroidism with chronic hypercalcemia per charts as well as a history of methamphetamine use per collateral from ex-wife.     History was difficult to obtain due to the patient's severe disorganized thinking on interview; he was observed with persecutory delusions pertaining to the realtor and gang members, disorganized behavior as these delusions have led him to flee to different hotels to stay safe/ has called  complaining of being targeted and auditory hallucinations of voices for the past 12 months. Per collateral from ex-wife, Santos has had paranoid ideations since they first met. He has always felt like people are out to get him or trying to rip him off. She says that he has had visual hallucinations (he will point things out that the rest of the family can't see) for a long time, but the family would just go along with him to avoid making him angry. This timeline and presentation could be consistent with diagnosis of paranoid personality disorder. Things began to worsen around the beginning of the COVID pandemic, when Santos became more isolated and the family started to notice cognitive issues such as impaired memory.     Immediately prior to this hospitalization, the ex-wife found Santos in a hotel room with a generator full of gasoline, binoculars, and hunting equipment. This time course does not suggest acute psychosis, however given the patient has never had  a full psychiatric work-up, we completed a first-episode psychosis work-up. Other things that could be contributing to his presentation is hyperparathyroidism with hypercalcemia. However, patient's calcium returned to normal limits on 10/16, and patient continues to have disorganized behavior unrelated to calcium elevation, so likely this is not a significant contributing factor to patient's presentation.      Currently, Santos is at times disoriented, but easily re-directable. He is able to take care of his ADLs without much assistance and he is mostly independent in the unit.. On the other hand, his confused behavior tend to increase in the evenings, seemingly related to  Neurocognitive Disorder diagnosis, and this could evolved to be his new baseline. Santos does not present with any new episodes of physical agitation and is able to attend groups and interact with peers without major altercations. Patient currently is stable with current neuroleptic dose, patient probably wont benefit from any modification in current therapy.     Goal of treatment:  Procure a memory care placement.         Psychiatric Plan by Diagnosis   Today's changes:  - none    # Major Neurocognitive Disorder  # Rule out paranoid personality disorder  1. Medications:  - Olanzapine 5 mg at bedtime  - Neurology consult 11/8/24, recommend outpatient follow-up and neuropsychiatric testing     2. Pertinent Labs/Monitoring:   - Re-eval Calcium parameters tomorrow     3. Additional Plans:  - Patient will be treated in therapeutic milieu with appropriate individual and group therapies as described  - Patient is Commitment with [facility]  - [facility] meds: Haldol, Clozaril, Risperdal, Invega, Zyprexa, Seroquel, Abilify  - 12/12/24: Psych team had phone call meeting with Memory Care facility personnel  - Pending responses primary from [facility]  - MNChoice Assessment scheduled on 12/31 at 10:00am with Amanda Beard.        # Unspecified anxiety vs Generalized Anxiety Disorder  - Monitor for symptoms.  - Fluoxetine held due to suspicion of ongoing manic symptoms     Psychiatric Hospital Course:      Estevan Aaron was admitted to [facility] on a 72 hour hold.   Medications:  PTA fluoxetine was held due to concern for worsening of zelalem   New medications started at the time of admission include Zyprexa.   Increased olanzapine 10 mg at bedtime was to 10 mg BID (10/14)   Increased olanzapine 10 mg BID to 10 mg during day and 15 mg at bedtime (10/15)  10/21: increased olanzapine pm dose from 15 to 20 mg  10/23: started melatonin 3 mg at 7 pm to help patient with circadian rhythm as he has been staying up throughout the night and sleeping a lot during the day and there is some concern for delirium   10/24: consulted anesthesia for MRI brain   10/28: MRI brain complete, decrease AM olanzapine from 10 to 5 mg  10/29: Morning olanzapine decreased to 2.5 mg, evening olanzapine decreased to 15 mg   11/1: Decreased evening olanzapine to 10 mg   11/4: Decreased evening olanzapine to 7.5 mg   11/5: Decreased evening olanzapine to 5 mg   11/11: Moved morning dose of olanzapine to the afternoon as patient appears more agitated in the afternoons/evenings  11/21: Started memantine 5 mg daily   11/26: Discontinued olanzapine 2.5 mg during the afternoon  12/2:   Discontinued memantine 5 mg, daily     Care conference 10/18/24 with daughter Maria C and ex-wife Clifford  - Report that patient has always been paranoid since ex-wife first met him. She describes him always feeling like he is getting ripped off.   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 They reported once incidence in which he made a bid on a job and the client paid him, but he never ended up finishing the job  - Wife and him  officially this year, and since selling the house several months ago, patient has been moving from hotel to hotel due to thinking people are out to get him   - When they were selling their house, patient threatened realtors and felt he was being ripped off   - Cilfford reports that she started to be afraid to be around him alone  - When he was found in the hotel, he had a generator full of gasoline, binoculars, bullets, and hunting equipment.     10/21/24: updated daughter on Santos's treatment plan      10/23/24: updated daughter on Santos's treatment plan      10/25/24: updated daughter on Santos's treatment plan, including plan to try and get MRI brain done under sedation. She said that Santos's grandfather had dementia and his sister has a brain tumor.      10/28/24: updated daughter on Santos's MRI results. She is planning on coming into town soon.      Care conference 11/1/24 with daughters Maria C and Estefani and ex-wife Clifford  - Discussed dementia diagnosis   - Clifford asked about plans moving forward. Explained that applying for medical assistance is the first step. Explained that application processing time can be very variable. Informed family that Santos will most likely be staying on this inpatient unit during this time.   - Clifford expressed that their family would like to be the power of /have conservatorship for Santos.   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 Reports that he has bills that he has not paid.   - Maria C is planning to move to [place], and Clifford currently lives in [place]. Family prefer that Santos would in a facility that are near these locations. Discussed with  family that they can also try to request a specific location (memory care).   - Clifford reports that he had previously gotten help applying for his social security and medicare, but unsure if it had been approved.   - Informed family that there is a geriatric unit and there might be a transfer if there becomes availability on this unit.   - Family shared that he was completely different just two months ago.   - Estefani shared that his family found his medications in his old truck recently, expressed that it was likely that he was not taking his medications prior to his hospitalization.      11/6/24: updated Maria C on plan to try and transfer Santos to Geriatric unit.      11/11/24: updated Maria C on neurology consult      The risks, benefits, alternatives, and side effects were discussed and understood by the patient and other caregivers.     Medical Assessment and Plan     Medical diagnoses to be addressed this admission:    # Hip Pain  - New right hip pain, and mild pain in multiple joints. Moderate response to topical antiinflammatory gel and lidocaine patch.   - Medicine consulted for recommendations 12/20    # CHF  # Hypertension  - Continue PTA medications  Furosemide 40 mg daily  Lisinopril 40 mg daily  Metoprolol 50 mg daily  Amlodipine 10 mg daily  Clonidine 0.1 mg BID  - Pitting edema in lower extremities, not painful 3+: Medicine consulted for recommendations 12/20    # Hyperlipidemia  - Continue PTA Atorvastatin 40 mg     # Primary Hyperparathyroidism  # Hypercalcemia, hypophosphoremia   Increased Ca level to 10.9 and decreased Ph level to 2.3 in the ED. Suspicion patient's hypercalcemia could be contributing to symptoms of psychosis.  - Consulted endocrinology, who started cinacalcet 30 mg BID on 10/13  - 10/16 endocrinology recommended continuing to trend calcium and albumin to make sure patient does not become hypocalcemic and recommended holding cinacalcet   - 10/17, calcium and albumin wnl, endo  recommended cinacalcet 30 mg once daily   - 10/21, per endo continue cincaclcet and recheck calcium and albumin on October 24  - 10/23: per endo, patient needs to follow up outpatient for further management of hyperparathyroidism      Medical course: Patient was physically examined by the ED prior to being transferred to the unit and was found to be medically stable and appropriate for admission.      Consults: Psychiatry, Endocrinology (follow-up of hyperthyroidism / hypercalcemia and hypertension), neurology     Checklist     Legal Status: Committed   MI Commitment with [facility]  File Number: [id number]  Start and expiration date of commitment: 10/24/24 - 04/24/25     [facility] meds: Haldol, Clozaril, Risperdal, Invega, Zyprexa, Seroquel, Abilify     PPS/CM:  Shelby Chowdary: [phone]  [email or web address].    Guardian/Conservator:  Clifford Aaron is currently emergency until 01/20/2025.       Safety Assessment:   Behavioral Orders   Procedures    Code 1 - Restrict to Unit    Routine Programming     As clinically indicated    Status 15     Every 15 minutes.    Status Individual Observation     SIO AT NIGHT     Patient SIO status reviewed with team/RN.  Please also refer to RN/team documentation for add'l detail.    -SIO staff to monitor following which have contributed to patient being on SIO:  Disruptive behavior at night     -When following observed, team will review discontinuation of SIO:  Less disruptive and redirectable at night     Order Specific Question:   CONTINUOUS 24 hours / day     Answer:   Other     Order Specific Question:   Specify distance     Answer:   10 ft     Order Specific Question:   Indications for SIO     Answer:   Severe intrusiveness       Risk Assessment:  Risk for harm is low  Risk factors: impulsive and past behaviors  Protective factors: family      SIO: 1:1 at nights    Disposition: Pending stabilization, medication optimization, & development of a safe  discharge plan.     Attestations     Resident without student: This patient was seen and discussed with my attending physician.    Rocío Orellana MD  [facility] Psychiatry Resident  12/27/2024    Attestation:  This patient has been seen and evaluated by me, Pete Smith MD.  I have discussed this patient with the house staff team including the resident and/or medical student and I agree with the findings and plan in this note.    I have reviewed today's vital signs, medications, labs and imaging. Pete Smith MD , PhD.

## 2024-12-27 NOTE — PLAN OF CARE
BEH IP Unit Acuity Rating Score (UARS)  Patient is given one point for every criteria they meet.    CRITERIA SCORING   On a 72 hour hold, court hold, committed, stay of commitment, or revocation. 1    Patient LOS on BEH unit exceeds 20 days. 1  LOS: 76   Patient under guardianship, 55+, otherwise medically complex, or under age 11. 1   Suicide ideation without relief of precipitating factors. 0   Current plan for suicide. 0   Current plan for homicide. 0   Imminent risk or actual attempt to seriously harm another without relief of factors precipitating the attempt. 1   Severe dysfunction in daily living (ex: complete neglect for self care, extreme disruption in vegetative function, extreme deterioration in social interactions). 1   Recent (last 7 days) or current physical aggression in the ED or on unit. 0   Restraints or seclusion episode in past 72 hours. 0   Recent (last 7 days) or current verbal aggression, agitation, yelling, etc., while in the ED or unit. 0   Active psychosis. 1   Need for constant or near constant redirection (from leaving, from others, etc).  0   Intrusive or disruptive behaviors. 1   Patient requires 3 or more hours of individualized nursing care per 8-hour shift (i.e. for ADLs, meds, therapeutic interventions). 1   TOTAL 8

## 2024-12-27 NOTE — PLAN OF CARE
Problem: Adult Behavioral Health Plan of Care  Goal: Optimized Coping Skills in Response to Life Stressors  Outcome: Not Progressing  Intervention: Promote Effective Coping Strategies  Recent Flowsheet Documentation  Taken 12/27/2024 1000 by Rocío Curry RN  Supportive Measures:   active listening utilized   counseling provided   goal-setting facilitated   positive reinforcement provided   self-care encouraged   self-reflection promoted   self-responsibility promoted   verbalization of feelings encouraged  Intervention: Promote Effective Coping Strategies  Recent Flowsheet Documentation  Taken 12/27/2024 1000 by Rocío Curry RN  Supportive Measures:   active listening utilized   counseling provided   goal-setting facilitated   positive reinforcement provided   self-care encouraged   self-reflection promoted   self-responsibility promoted   verbalization of feelings encouraged     Problem: Depression  Goal: Improved Mood  Outcome: Not Progressing  Intervention: Monitor and Manage Depressive Symptoms  Recent Flowsheet Documentation  Taken 12/27/2024 1000 by Rocío Curry RN  Supportive Measures:   active listening utilized   counseling provided   goal-setting facilitated   positive reinforcement provided   self-care encouraged   self-reflection promoted   self-responsibility promoted   verbalization of feelings encouraged  Family/Support System Care:   involvement promoted   self-care encouraged   presence promoted   support provided     Problem: Anxiety  Goal: Anxiety Reduction or Resolution  Outcome: Not Progressing  Intervention: Promote Anxiety Reduction  Recent Flowsheet Documentation  Taken 12/27/2024 1000 by Rocío Curry RN  Supportive Measures:   active listening utilized   counseling provided   goal-setting facilitated   positive reinforcement provided   self-care encouraged   self-reflection promoted   self-responsibility promoted   verbalization of  "feelings encouraged  Family/Support System Care:   involvement promoted   self-care encouraged   presence promoted   support provided     Problem: Adult Behavioral Health Plan of Care  Goal: Adheres to Safety Considerations for Self and Others  Outcome: Progressing  Intervention: Develop and Maintain Individualized Safety Plan  Recent Flowsheet Documentation  Taken 12/27/2024 1000 by Rocío Curry RN  Safety Measures:   environmental rounds completed   safety rounds completed  Intervention: Develop and Maintain Individualized Safety Plan  Recent Flowsheet Documentation  Taken 12/27/2024 1000 by Rocío Curry RN  Safety Measures:   environmental rounds completed   safety rounds completed   Goal Outcome Evaluation:    Plan of Care Reviewed With: patient      Pt was sleepy at the beginning of the shift. He was out in the lounge in front of the TV. He is eating and drinking adequately. He is compliant with medications. Mid morning, pt was irritable. He said  \"Dementia??? And I can't drive a car?\" Pt is angry about his diagnosis. Pt  cannot fully verbalize his frustration, impoverished with words.Pt continue to say the word dementia whole morning.  He denied all mental health symptoms.       "

## 2024-12-27 NOTE — PLAN OF CARE
"Team Note Due:  Monday    Assessment/Intervention/Current Symtoms and Care Coordination:  Chart review and met with team, discussed pt progress, symptomology, and response to treatment.  Discussed the discharge plan and any potential impediments to discharge. Clifford Aaron is currently emergency guardian/conservator until 01/20/2025.    Per Clifford, she received an update from [facility] stating: \"After reviewing the clinical documentation and looping in our corporate team, we feel he is better suited for a Skilled setting with 24-hour Nursing oversight or a men's group home due to the level of care and oversight he is needing.\" I followed up with [facility] to request more details on reason for denial and notified family of attempting to clarify this.    Received call from [facility]  to confirm assessment scheduled next week. In discussing pt's care, writer clarified pt does not qualify for MA at this time so an application has not yet been completed. Since the [facility] Assessment is only good for 60 days, we agreed it would be best to postpone this until his discharge from the hospital. Derrell will hold onto the referral and requested she be contacted directly to reschedule.    Discharge Plan or Goal:  Memory care facility     Barriers to Discharge:  Patient requires further psychiatric stabilization due to current symptomology, medication management with changes subject to provider, coordination with outside supports, and aftercare planning. Pt is under civil commitment.     Referral Status:    [facility] Care facilities currently in process:  [facility]. Tour completed 11/27.  [facility] [place] completed 11/[facility].  [facility]. Tour completed 11/30.  [facility] [place]. Admission pending. Denied 12/26?  Nikki Noel: [email or web address]; Office [phone], Fax [phone]    Memory Care facilities pending family " review:  [facility].   [facility].  [facility].  [facility]placed by [facility].  [facility].  [facility].    Memory Care facilities declined:  [facility] (private pay only, no EW).  [facility] [address] (private pay only, no EW).  [facility] (no openings).  [facility]. Tour completed 11/29. Records sent 12/2/24. Declined 12/19/24 (don't feel they are an appropriate facility for pt's needs).  Lorraine (): [email or web address]; [phone]  Isatu (director of nursing): [email or web address]     Legal Status:  MI Commitment with [facility]  File Number: [id number]  Start and expiration date of commitment: 10/24/24 - 04/24/25    [facility] meds: Haldol, Clozaril, Risperdal, Invega, Zyprexa, Seroquel, Abilify    PPS/CM:  Shelby Chowdary: [phone]  [email or web address].    Contacts:  Maria C Aaron (Daughter): [phone]   Clifford Agnieszka (ex-wife): [phone]     No Del Angel (guardianship/conservatorship ): [phone]  [email or web address]     Upcoming Meetings and Dates/Important Information and next steps:  Follow up with family regarding list of Memory Care facilities  Discharge planning when appropriate  Pt does not qualify for MA per financial counselor on 11/8/2024. Pt will likely privately pay for Memory Care until he qualifies for MA/waivers in the future.  [facility] Assessment needs to be rescheduled (only valid for 60 days, so ideally scheduled for after pt discharges and has an opportunity to apply for MA)  Contact Derrell Duff to do this ([phone]; [email or web address].)    Provisional Discharge and Change of Status needed at discharge

## 2024-12-28 LAB — SARS-COV-2 RNA RESP QL NAA+PROBE: NEGATIVE

## 2024-12-28 PROCEDURE — 124N000002 HC R&B MH UMMC

## 2024-12-28 PROCEDURE — 250N000013 HC RX MED GY IP 250 OP 250 PS 637

## 2024-12-28 PROCEDURE — 87635 SARS-COV-2 COVID-19 AMP PRB: CPT

## 2024-12-28 RX ADMIN — AMLODIPINE BESYLATE 10 MG: 5 TABLET ORAL at 09:51

## 2024-12-28 RX ADMIN — FUROSEMIDE 40 MG: 40 TABLET ORAL at 09:51

## 2024-12-28 RX ADMIN — Medication 1 PATCH: at 10:14

## 2024-12-28 RX ADMIN — CINACALCET 30 MG: 30 TABLET ORAL at 09:50

## 2024-12-28 RX ADMIN — CLONIDINE HYDROCHLORIDE 0.1 MG: 0.1 TABLET ORAL at 09:51

## 2024-12-28 RX ADMIN — METOPROLOL SUCCINATE 50 MG: 50 TABLET, EXTENDED RELEASE ORAL at 09:51

## 2024-12-28 RX ADMIN — ACETAMINOPHEN 650 MG: 325 TABLET, FILM COATED ORAL at 23:25

## 2024-12-28 RX ADMIN — LISINOPRIL 40 MG: 10 TABLET ORAL at 09:50

## 2024-12-28 RX ADMIN — ATORVASTATIN CALCIUM 40 MG: 20 TABLET, FILM COATED ORAL at 09:50

## 2024-12-28 RX ADMIN — OLANZAPINE 5 MG: 5 TABLET, ORALLY DISINTEGRATING ORAL at 22:12

## 2024-12-28 ASSESSMENT — ACTIVITIES OF DAILY LIVING (ADL)
ADLS_ACUITY_SCORE: 66
ADLS_ACUITY_SCORE: 66
ORAL_HYGIENE: INDEPENDENT
ADLS_ACUITY_SCORE: 66
DRESS: INDEPENDENT
ADLS_ACUITY_SCORE: 66
DRESS: INDEPENDENT
ADLS_ACUITY_SCORE: 66
ORAL_HYGIENE: INDEPENDENT
ADLS_ACUITY_SCORE: 66
HYGIENE/GROOMING: INDEPENDENT
ADLS_ACUITY_SCORE: 66
HYGIENE/GROOMING: INDEPENDENT
ADLS_ACUITY_SCORE: 66

## 2024-12-28 NOTE — PLAN OF CARE
Problem: Seclusion/Restraint, Behavioral  Goal: Seclusion/Behavioral Restraint Goal: Absence of Harm or Injury  Outcome: Progressing    Pt appears to have slept for 5 hours. Pt  was intermittently intrusive/attempting to go to other pt's rooms, but was easily redirected. He denies pain, anxiety, depression, and SI/SIB. No PRN medications were given. Pt is on SIO, 1:1 for severe intrusiveness on night shift only. 15 minutes safety checks were in place. Staff will continue to offer support to pt.

## 2024-12-28 NOTE — PLAN OF CARE
Problem: Adult Behavioral Health Plan of Care  Goal: Adheres to Safety Considerations for Self and Others  Outcome: Progressing  Goal: Absence of New-Onset Illness or Injury  Outcome: Progressing  Goal: Optimized Coping Skills in Response to Life Stressors  Outcome: Progressing     Problem: Suicidal Behavior  Goal: Suicidal Behavior is Absent or Managed  Outcome: Progressing     Problem: Sleep Disturbance  Goal: Adequate Sleep/Rest  Outcome: Progressing     Problem: Pain Acute  Goal: Optimal Pain Control and Function  Outcome: Progressing   Goal Outcome Evaluation:    Pt got up later than usual. Per report he slept for 5 hours.  Pt sat up on his usual seat at the [facility] area, ate his meal, then watched movies. He snoozes every now and then. Pt is pleasant and cooperative. He denied anxiety, depression, SI and hallucinations. He still wants to get his belongings from the locker and  expresses his  disappointment  about not being able to discharge. Pt is drinking and eating adequately. He drinks lots of coffee. Pt Covid test result was negative.

## 2024-12-28 NOTE — PLAN OF CARE
Patient was out visible, appropriate, continues to present with lack of insight to situation, affect was bright consistent with mood, participated in group activities, intake and hygiene was adequate, denies any concerns, vitals were stable, he was medication compliant.   Problem: Psychotic Signs/Symptoms  Goal: Improved Behavioral Control (Psychotic Signs/Symptoms)  Outcome: Progressing  Intervention: Manage Behavior  Recent Flowsheet Documentation  Taken 12/27/2024 2200 by Alice Sethi RN  De-Escalation Techniques:   appropriate behavior reinforced   diversional activity encouraged   medication administered   stimulation decreased   Goal Outcome Evaluation:    Plan of Care Reviewed With: patient

## 2024-12-29 PROCEDURE — 124N000002 HC R&B MH UMMC

## 2024-12-29 PROCEDURE — 250N000013 HC RX MED GY IP 250 OP 250 PS 637

## 2024-12-29 RX ADMIN — CLONIDINE HYDROCHLORIDE 0.1 MG: 0.1 TABLET ORAL at 08:34

## 2024-12-29 RX ADMIN — ACETAMINOPHEN 650 MG: 325 TABLET, FILM COATED ORAL at 08:35

## 2024-12-29 RX ADMIN — AMLODIPINE BESYLATE 10 MG: 5 TABLET ORAL at 08:34

## 2024-12-29 RX ADMIN — CINACALCET 30 MG: 30 TABLET ORAL at 08:34

## 2024-12-29 RX ADMIN — ATORVASTATIN CALCIUM 40 MG: 20 TABLET, FILM COATED ORAL at 08:34

## 2024-12-29 RX ADMIN — FUROSEMIDE 40 MG: 40 TABLET ORAL at 08:34

## 2024-12-29 RX ADMIN — LISINOPRIL 40 MG: 10 TABLET ORAL at 08:34

## 2024-12-29 RX ADMIN — OLANZAPINE 5 MG: 5 TABLET, ORALLY DISINTEGRATING ORAL at 21:56

## 2024-12-29 RX ADMIN — Medication 1 PATCH: at 08:34

## 2024-12-29 RX ADMIN — METOPROLOL SUCCINATE 50 MG: 50 TABLET, EXTENDED RELEASE ORAL at 08:34

## 2024-12-29 ASSESSMENT — ACTIVITIES OF DAILY LIVING (ADL)
ADLS_ACUITY_SCORE: 66
DRESS: INDEPENDENT
ADLS_ACUITY_SCORE: 66
HYGIENE/GROOMING: INDEPENDENT
ADLS_ACUITY_SCORE: 66
ORAL_HYGIENE: INDEPENDENT
ADLS_ACUITY_SCORE: 66

## 2024-12-29 NOTE — PLAN OF CARE
Problem: Sleep Disturbance  Goal: Adequate Sleep/Rest  Outcome: Progressing    Pt appears to have slept for 4.75 hours. Pt  was occasionally  intrusive, but was easily redirected. He denies pain, or discomfort, and no PRN medications were given. Pt is on SIO, 1:1 for severe intrusiveness on night shift only. 15 minutes safety checks were in place. Staff will continue to offer support to pt.

## 2024-12-29 NOTE — PLAN OF CARE
"Goal Outcome Evaluation:    Plan of Care Reviewed With: patient        Problem: Adult Behavioral Health Plan of Care  Goal: Adheres to Safety Considerations for Self and Others  Outcome: Progressing  Intervention: Develop and Maintain Individualized Safety Plan  Recent Flowsheet Documentation  Taken 12/29/2024 1100 by Rocío Curry RN  Safety Measures:   environmental rounds completed   safety rounds completed  Intervention: Develop and Maintain Individualized Safety Plan  Recent Flowsheet Documentation  Taken 12/29/2024 1100 by Rocío Curry RN  Safety Measures:   environmental rounds completed   safety rounds completed  Goal: Absence of New-Onset Illness or Injury  Outcome: Progressing  Intervention: Identify and Manage Fall Risk  Recent Flowsheet Documentation  Taken 12/29/2024 1100 by Rocío Curry RN  Safety Measures:   environmental rounds completed   safety rounds completed     Problem: Depression  Goal: Improved Mood  Outcome: Progressing  Intervention: Monitor and Manage Depressive Symptoms  Recent Flowsheet Documentation  Taken 12/29/2024 1100 by Rocío Curry RN  Family/Support System Care:   involvement promoted   self-care encouraged   presence promoted   support provided     Problem: Suicidal Behavior  Goal: Suicidal Behavior is Absent or Managed  Outcome: Progressing     Pt is visible in the milieu. He said \" I still want to. you know... to get out of here\". His presentation has been fairly the same. Eats and drinks adequately. Medication compliant. He complained of hip pain at 8/10. PRN Tylenol administered. Pain alleviated . Pt was observed talking on the phone with his \" 2nd favorite sister.\" He can be over heard saying \"I am not retired\".   Pt was noted trying to help the  with her chores and the  declined. He came out again from his room while he was brushing his teeth. He was redirected. Pt complied, but he complained " "irritably \"everything is wrong in here, .. You can't do this, you can't do that\". Overall, pt is cooperative.   "

## 2024-12-29 NOTE — PLAN OF CARE
Patient was intermittently visible, affect was blunted, he was intermittently presenting with baseline confusion, was complaint with medication management, ate dinner well, appeared well kept, he denied pain and discomfort, required multiple redirection and prompting.     Problem: Adult Behavioral Health Plan of Care  Goal: Optimized Coping Skills in Response to Life Stressors  Outcome: Not Progressing

## 2024-12-30 LAB — SARS-COV-2 RNA RESP QL NAA+PROBE: NEGATIVE

## 2024-12-30 PROCEDURE — 87635 SARS-COV-2 COVID-19 AMP PRB: CPT

## 2024-12-30 PROCEDURE — 250N000013 HC RX MED GY IP 250 OP 250 PS 637

## 2024-12-30 PROCEDURE — 99231 SBSQ HOSP IP/OBS SF/LOW 25: CPT | Mod: GC | Performed by: PSYCHIATRY & NEUROLOGY

## 2024-12-30 PROCEDURE — 124N000002 HC R&B MH UMMC

## 2024-12-30 RX ADMIN — ACETAMINOPHEN 650 MG: 325 TABLET, FILM COATED ORAL at 04:18

## 2024-12-30 RX ADMIN — CLONIDINE HYDROCHLORIDE 0.1 MG: 0.1 TABLET ORAL at 21:12

## 2024-12-30 RX ADMIN — ATORVASTATIN CALCIUM 40 MG: 20 TABLET, FILM COATED ORAL at 08:39

## 2024-12-30 RX ADMIN — CLONIDINE HYDROCHLORIDE 0.1 MG: 0.1 TABLET ORAL at 08:39

## 2024-12-30 RX ADMIN — METOPROLOL SUCCINATE 50 MG: 50 TABLET, EXTENDED RELEASE ORAL at 08:39

## 2024-12-30 RX ADMIN — AMLODIPINE BESYLATE 10 MG: 5 TABLET ORAL at 08:39

## 2024-12-30 RX ADMIN — Medication 1 PATCH: at 08:39

## 2024-12-30 RX ADMIN — MELATONIN TAB 3 MG 3 MG: 3 TAB at 21:13

## 2024-12-30 RX ADMIN — LISINOPRIL 40 MG: 10 TABLET ORAL at 08:39

## 2024-12-30 RX ADMIN — OLANZAPINE 5 MG: 5 TABLET, ORALLY DISINTEGRATING ORAL at 21:12

## 2024-12-30 RX ADMIN — CINACALCET 30 MG: 30 TABLET ORAL at 08:39

## 2024-12-30 RX ADMIN — Medication 25 MG: at 21:11

## 2024-12-30 RX ADMIN — FUROSEMIDE 40 MG: 40 TABLET ORAL at 08:39

## 2024-12-30 ASSESSMENT — ACTIVITIES OF DAILY LIVING (ADL)
ADLS_ACUITY_SCORE: 66
DRESS: STREET CLOTHES;INDEPENDENT
ADLS_ACUITY_SCORE: 66
ORAL_HYGIENE: INDEPENDENT
DRESS: INDEPENDENT
ADLS_ACUITY_SCORE: 66
LAUNDRY: WITH SUPERVISION
ADLS_ACUITY_SCORE: 66
HYGIENE/GROOMING: BATH;INDEPENDENT
ADLS_ACUITY_SCORE: 66
ORAL_HYGIENE: INDEPENDENT
ADLS_ACUITY_SCORE: 66
HYGIENE/GROOMING: BATH;INDEPENDENT
ADLS_ACUITY_SCORE: 66
LAUNDRY: WITH SUPERVISION
ADLS_ACUITY_SCORE: 66

## 2024-12-30 NOTE — PLAN OF CARE
Problem: Adult Behavioral Health Plan of Care  Goal: Adheres to Safety Considerations for Self and Others  Outcome: Progressing  Intervention: Develop and Maintain Individualized Safety Plan  Recent Flowsheet Documentation  Taken 12/30/2024 1400 by Rocío Curry RN  Safety Measures:   environmental rounds completed   safety rounds completed  Intervention: Develop and Maintain Individualized Safety Plan  Recent Flowsheet Documentation  Taken 12/30/2024 1400 by Rocío Curry RN  Safety Measures:   environmental rounds completed   safety rounds completed  Goal: Absence of New-Onset Illness or Injury  Outcome: Progressing  Intervention: Identify and Manage Fall Risk  Recent Flowsheet Documentation  Taken 12/30/2024 1400 by Rocío Curry RN  Safety Measures:   environmental rounds completed   safety rounds completed     Problem: Depression  Goal: Improved Mood  Outcome: Progressing  Intervention: Monitor and Manage Depressive Symptoms  Recent Flowsheet Documentation  Taken 12/30/2024 1400 by Rocío Curry RN  Family/Support System Care:   involvement promoted   self-care encouraged   presence promoted   support provided     Problem: Suicidal Behavior  Goal: Suicidal Behavior is Absent or Managed  Outcome: Progressing     Problem: Anxiety  Goal: Anxiety Reduction or Resolution  Outcome: Progressing  Intervention: Promote Anxiety Reduction  Recent Flowsheet Documentation  Taken 12/30/2024 1400 by Rocío Curry RN  Family/Support System Care:   involvement promoted   self-care encouraged   presence promoted   support provided   Goal Outcome Evaluation:    Plan of Care Reviewed With: patient    Pt is visible in the milieu this shift. He is pleasant and cooperative. He was compliant with COVID swab test which is negative. His nutrition, hydration and hygiene is adequate. He took a long bath this shift. He was able to do his laundry with supervision. Pt  denied all  mental health symptoms. Pt SIO discontinued for nights. No behavioral concerns this shift.

## 2024-12-30 NOTE — PLAN OF CARE
The patient was observed out in the common area, visibly frustrated and exhibiting angry outbursts. Despite moments of agitation, he engaged in watching football during the shift. The patient denied experiencing any pain or discomfort but continued to deny his illness, exhibiting poor insight and judgment.The patient initially refused to take his HS (hour of sleep) medication. However, after multiple prompts from different staff members, he complied with taking his Ortega Zyprexa 5 mg ODT. Staff noted that repeated redirection and encouragement were required to achieve compliance. While he engaged in some activities, his frustration impacted his interactions with staff and peers. Additional support and monitoring may be needed to address his refusal behaviors and emotional regulation.  Problem: Psychotic Signs/Symptoms  Goal: Improved Behavioral Control (Psychotic Signs/Symptoms)  Outcome: Progressing   Goal Outcome Evaluation:    Plan of Care Reviewed With: patient

## 2024-12-30 NOTE — PROGRESS NOTES
----------------------------------------------------------------------------------------------------------  [facility]  Psychiatry Progress Note  Hospital Day #79     Interim History:     The patient's care was discussed with the treatment team and chart notes were reviewed.    Identifier: Estevan Aaron is a 64 year old male with previous psychiatric diagnoses of  generalized anxiety disorder, Neurocognitive disorder, admitted from the ED 10/12/2024 due to concern for HI and psychosis in the context of medical issues (hyperthyroidism, hypercalcemia) psychosocial stressors including family dynamics with recent possible divorce.    Sleep: 4.25 hours (12/30/24 0600)  Scheduled medications: Took all scheduled medications as prescribed  Psychiatric PRN medications:   Last 24H PRN:     acetaminophen (TYLENOL) tablet 650 mg, 650 mg at 12/30/24 0418None      Staff Report:   Overnight, Santos was occasionally  intrusive, but was easily redirected. He denies anxiety, depression, and SI/SIB. On the weekend, he refused to take his HS (hour of sleep) medication. However, after multiple prompts from different staff members, he complied with taking his [facility] Zyprexa 5 mg ODT. Staff noted that repeated redirection and encouragement were required to achieve compliance. While he engaged in some activities, his frustration impacted his interactions with staff and peers.     See staff notes for more information     Subjective:     Patient Interview:  Santos was met on his room. He mentions to have a shower and feels good. He denies any physical concerns and mentions how his appetite is good with physical motions of having a full belly. No psychiatric symptoms addressed.     ROS:  See above     Objective:     Vitals:  /76   Pulse 61   Temp 98.2  F (36.8  C)   Resp 17   Wt 110.6 kg (243 lb 12.8 oz)   SpO2 98%   BMI 39.35 kg/m      Allergies:  No Known Allergies    Current  Medications:  Scheduled:  Current Facility-Administered Medications   Medication Dose Route Frequency Provider Last Rate Last Admin    acetaminophen (TYLENOL) tablet 650 mg  650 mg Oral Q4H PRN Nikki Caceres MD   650 mg at 12/30/24 0418    alum & mag hydroxide-simethicone (MAALOX) suspension 30 mL  30 mL Oral Q4H PRN Nikki Caceres MD   30 mL at 10/17/24 0837    amLODIPine (NORVASC) tablet 10 mg  10 mg Oral Daily Presley Barrera MD   10 mg at 12/29/24 0834    atorvastatin (LIPITOR) tablet 40 mg  40 mg Oral Daily Nikki Caceres MD   40 mg at 12/29/24 0834    cholecalciferol (VITAMIN D3) capsule 1,250 mcg  1,250 mcg Oral Q7 Days SirishaEveliaDO   1,250 mcg at 12/24/24 1252    cinacalcet (SENSIPAR) tablet 30 mg  30 mg Oral Daily Presley Barrera MD   30 mg at 12/29/24 0834    cloNIDine (CATAPRES) tablet 0.1 mg  0.1 mg Oral BID Presley Barrera MD   0.1 mg at 12/29/24 0834    diclofenac (VOLTAREN) 1 % topical gel 2 g  2 g Topical 4x Daily PRN Rocío Lopez MD   2 g at 12/22/24 2032    furosemide (LASIX) tablet 40 mg  40 mg Oral Daily Presley Barrera MD   40 mg at 12/29/24 0834    gabapentin (NEURONTIN) capsule 100 mg  100 mg Oral Q6H PRN Nikki Caceres MD   100 mg at 12/24/24 0046    hydrocortisone (CORTAID) 0.5 % cream   Topical Daily PRN Savi Chamorro MD        Lidocaine (LIDOCARE) 4 % Patch 1 patch  1 patch Transdermal Q24H PRN Rocío Lopez MD   1 patch at 12/22/24 2023    lisinopril (ZESTRIL) tablet 40 mg  40 mg Oral Daily Presley Barrera MD   40 mg at 12/29/24 0834    melatonin tablet 3 mg  3 mg Oral At Bedtime Presley Barrera MD   3 mg at 12/27/24 2151    metoprolol succinate ER (TOPROL XL) 24 hr tablet 50 mg  50 mg Oral Daily Presley Barrera MD   50 mg at 12/29/24 0834    nicotine (NICODERM CQ) 14 MG/24HR 24 hr patch 1 patch  1 patch Transdermal Daily Evelia Grimm DO   1 patch at 12/29/24 0834    nicotine (NICORETTE) gum 2 mg  2 mg Buccal Q1H  PRN González Garcia MD   2 mg at 10/24/24 1254    OLANZapine zydis (zyPREXA) ODT tab 5 mg  5 mg Oral At Bedtime González Garcia MD   5 mg at 12/29/24 2156    Or    OLANZapine (zyPREXA) injection 10 mg  10 mg Intramuscular At Bedtime González Garcia MD        OLANZapine (zyPREXA) tablet 5 mg  5 mg Oral TID PRN Evelia Grimm DO   5 mg at 11/24/24 0436    Or    OLANZapine (zyPREXA) injection 5 mg  5 mg Intramuscular TID PRN Evelia Grimm DO        polyethylene glycol (MIRALAX) Packet 17 g  17 g Oral Daily PRN Nikki Caceres MD        traZODone (DESYREL) half-tab 25 mg  25 mg Oral At Bedtime Rocío Lopez MD   25 mg at 12/27/24 2151    traZODone (DESYREL) half-tab 25 mg  25 mg Oral At Bedtime PRN Evelia Grimm DO   25 mg at 12/24/24 0046       PRN:  Current Facility-Administered Medications   Medication Dose Route Frequency Provider Last Rate Last Admin    acetaminophen (TYLENOL) tablet 650 mg  650 mg Oral Q4H PRN Nikki Caceres MD   650 mg at 12/30/24 0418    alum & mag hydroxide-simethicone (MAALOX) suspension 30 mL  30 mL Oral Q4H PRN Nikki Caceres MD   30 mL at 10/17/24 0837    amLODIPine (NORVASC) tablet 10 mg  10 mg Oral Daily Presley Barrera MD   10 mg at 12/29/24 0834    atorvastatin (LIPITOR) tablet 40 mg  40 mg Oral Daily Nikki Caceres MD   40 mg at 12/29/24 0834    cholecalciferol (VITAMIN D3) capsule 1,250 mcg  1,250 mcg Oral Q7 Days Evelia Grimm DO   1,250 mcg at 12/24/24 1252    cinacalcet (SENSIPAR) tablet 30 mg  30 mg Oral Daily Presley Barrera MD   30 mg at 12/29/24 0834    cloNIDine (CATAPRES) tablet 0.1 mg  0.1 mg Oral BID Presley Barrera MD   0.1 mg at 12/29/24 0834    diclofenac (VOLTAREN) 1 % topical gel 2 g  2 g Topical 4x Daily PRN Rocío Lopez MD   2 g at 12/22/24 2032    furosemide (LASIX) tablet 40 mg  40 mg Oral Daily Presley Barrera MD   40 mg at 12/29/24 0834    gabapentin (NEURONTIN) capsule 100 mg  100 mg Oral Q6H PRN  Nikki Caceres MD   100 mg at 12/24/24 0046    hydrocortisone (CORTAID) 0.5 % cream   Topical Daily PRN Savi Chamorro MD        Lidocaine (LIDOCARE) 4 % Patch 1 patch  1 patch Transdermal Q24H PRN Rocío Lopez MD   1 patch at 12/22/24 2023    lisinopril (ZESTRIL) tablet 40 mg  40 mg Oral Daily Presley Barrera MD   40 mg at 12/29/24 0834    melatonin tablet 3 mg  3 mg Oral At Bedtime Presley Barrera MD   3 mg at 12/27/24 2151    metoprolol succinate ER (TOPROL XL) 24 hr tablet 50 mg  50 mg Oral Daily Presley Barrera MD   50 mg at 12/29/24 0834    nicotine (NICODERM CQ) 14 MG/24HR 24 hr patch 1 patch  1 patch Transdermal Daily Evelia Grimm DO   1 patch at 12/29/24 0834    nicotine (NICORETTE) gum 2 mg  2 mg Buccal Q1H PRN González Garcia MD   2 mg at 10/24/24 1254    OLANZapine zydis (zyPREXA) ODT tab 5 mg  5 mg Oral At Bedtime González Garcia MD   5 mg at 12/29/24 2156    Or    OLANZapine (zyPREXA) injection 10 mg  10 mg Intramuscular At Bedtime González Garcia MD        OLANZapine (zyPREXA) tablet 5 mg  5 mg Oral TID PRN Evelia Grimm DO   5 mg at 11/24/24 0436    Or    OLANZapine (zyPREXA) injection 5 mg  5 mg Intramuscular TID PRN Evelia Grimm DO        polyethylene glycol (MIRALAX) Packet 17 g  17 g Oral Daily PRN Nikki Caceres MD        traZODone (DESYREL) half-tab 25 mg  25 mg Oral At Bedtime Rocío Lopez MD   25 mg at 12/27/24 2151    traZODone (DESYREL) half-tab 25 mg  25 mg Oral At Bedtime PRN Evelia Grimm DO   25 mg at 12/24/24 0046     Labs and Imaging:  Data this admission:  - CBC unremarkable  - CMP unremarkable  - TSH normal  - UDS negative  - Vit D low  - Hgb A1c 5.9 (10/13/24)  - Lipids unremarkable  - Vit B12 normal  - Folate normal  - Urinalysis unremarkable  - EKG normal sinus rhythm, QTc 390 ms  - Head CT showed no acute changes  - HIV non reactive  - Treponema antibody non reactive  - ESR wnl   - Ceruloplasmin wnl   - BOOGIE  "negative  - Lyme negative      Parathyroid hormone:   10/13: 85     Calcium:  10/14: 11.4  10/16: 10.3  10/17: 10.3  10/19: 9.8  10/21: 10.6  10/23: 10.3     Albumin:  10/14: 4.3  10/16: 4.3  10/17: 4.1  10/19: 4.2  10/21: 4.5  10/23: 4.3      CXR:   Impression:   1. No definite radiographic evidence of asbestosis. If clinically  indicated, consider evaluation with high resolution chest CT.  2. Nonspecific left costophrenic blunting. Given symmetrically low  lung volumes may be related to poor inspiratory effort/timing.  3. Pulmonary vascular congestion.     MRI:   IMPRESSION:  1. No acute intracranial pathology.  2. No focal lesion or structural abnormality.   3. Symmetric frontoparietal cortical volume loss slightly more than  expected for age.     Mental Status Exam:   Oriented to: Oriented to self only  General:  Awake and Alert  Appearance:  appears stated age, Grooming is adequate, and Dressed in his own clothes  Behavior/Attitude:  Disengaged, Easy to redirect, and Easily distracted  Eye Contact: Downcast distracted  Psychomotor: No evidence of tics, dystonia, or tardive dyskinesia  no catatonia present  Speech:  appropriate volume/tone, talkative, and spontaneous   Language: Fluent in English with appropriate syntax and vocabulary.  Mood:  \"good\"  Affect:  appropriate  Thought Process:  tangential and confused  Thought Content:   did not assess SI/HI/ delusion of confusion . Patient denies paranoia  Associations:  loose  Insight:  impaired   Judgment:  impaired   Impulse control: limited  Attention Span:   mildly decreased  Concentration:   distractible  Recent and Remote Memory:   remote memory intact, recent memory impaired  Fund of Knowledge: average  Muscle Strength and Tone: normal  Gait and Station: Normal     Psychiatric Assessment     Estevan Aaron is a 64 year old male with previous psychiatric diagnoses of GEORGINA admitted from the ER on 10/12/2024 due to concern for HI and psychosis in the context of " medical issues (hyperthyroidism, hypercalcemia), psychosocial stressors including with recent divorce and selling his home. This is the patient's first psychiatric hospitalization. Significant symptoms on admission included delusions of persecution with grandiose beliefs, homicidal ideations, as well as disorganized thinking and behavior. The MSE on admission was pertinent for a thought process which was perseverative, circumstantial, tangential, disorganized and tangential; with rambling and looseness of associations. Psychological contributions to presentation included lack of insight. Social factors contributing to presentation included isolation, recent divorce, selling his house, and moving from hotel to hotel. Biological contributions to presentation included a history of hyperparathyroidism with chronic hypercalcemia per charts as well as a history of methamphetamine use per collateral from ex-wife.     History was difficult to obtain due to the patient's severe disorganized thinking on interview; he was observed with persecutory delusions pertaining to the realtor and gang members, disorganized behavior as these delusions have led him to flee to different hotels to stay safe/ has called  complaining of being targeted and auditory hallucinations of voices for the past 12 months. Per collateral from ex-wife, Santos has had paranoid ideations since they first met. He has always felt like people are out to get him or trying to rip him off. She says that he has had visual hallucinations (he will point things out that the rest of the family can't see) for a long time, but the family would just go along with him to avoid making him angry. This timeline and presentation could be consistent with diagnosis of paranoid personality disorder. Things began to worsen around the beginning of the COVID pandemic, when Santos became more isolated and the family started to notice cognitive issues such as impaired memory.      Immediately prior to this hospitalization, the ex-wife found Santos in a hotel room with a generator full of gasoline, binoculars, and hunting equipment. This time course does not suggest acute psychosis, however given the patient has never had a full psychiatric work-up, we completed a first-episode psychosis work-up. Other things that could be contributing to his presentation is hyperparathyroidism with hypercalcemia. However, patient's calcium returned to normal limits on 10/16, and patient continues to have disorganized behavior unrelated to calcium elevation, so likely this is not a significant contributing factor to patient's presentation.      Currently, Santos is at times disoriented, but easily re-directable. He is able to take care of his ADLs without much assistance and he is mostly independent in the unit. On the other hand, his confused behavior tend to increase in the evenings, seemingly related to  Neurocognitive Disorder diagnosis, and this could evolved to be his new baseline. Either way Santos does not present as a danger to himself or other so his SIO was discontinued. Santos does not present with any new episodes of physical agitation and is able to attend groups and interact with peers without major altercations. Patient currently is stable with current neuroleptic dose, patient probably wont benefit from any modification in current therapy.     Goal of treatment:  Procure a memory care placement.        Psychiatric Plan by Diagnosis   Today's changes:  - discontinue SIO    # Major Neurocognitive Disorder  # Rule out paranoid personality disorder  1. Medications:  - Olanzapine 5 mg at bedtime  - Neurology consult 11/8/24, recommend outpatient follow-up and neuropsychiatric testing     2. Pertinent Labs/Monitoring:   - Re-eval Calcium parameters tomorrow     3. Additional Plans:  - Patient will be treated in therapeutic milieu with appropriate individual and group therapies as described  - Patient is  Commitment with [facility]  - [facility] meds: Haldol, Clozaril, Risperdal, Invega, Zyprexa, Seroquel, Abilify  - 12/12/24: Psych team had phone call meeting with Memory Care facility personnel  - Pending responses primary from [facility] and [facility]  - MNChoice Assessment scheduled on 12/31 at 10:00am with Amanda Beard.       # Unspecified anxiety vs Generalized Anxiety Disorder  - Monitor for symptoms.  - Fluoxetine held due to suspicion of ongoing manic symptoms     Psychiatric Hospital Course:      Estevan Aaron was admitted to [facility] on a 72 hour hold.   Medications:  PTA fluoxetine was held due to concern for worsening of zelalem   New medications started at the time of admission include Zyprexa.   Increased olanzapine 10 mg at bedtime was to 10 mg BID (10/14)   Increased olanzapine 10 mg BID to 10 mg during day and 15 mg at bedtime (10/15)  10/21: increased olanzapine pm dose from 15 to 20 mg  10/23: started melatonin 3 mg at 7 pm to help patient with circadian rhythm as he has been staying up throughout the night and sleeping a lot during the day and there is some concern for delirium   10/24: consulted anesthesia for MRI brain   10/28: MRI brain complete, decrease AM olanzapine from 10 to 5 mg  10/29: Morning olanzapine decreased to 2.5 mg, evening olanzapine decreased to 15 mg   11/1: Decreased evening olanzapine to 10 mg   11/4: Decreased evening olanzapine to 7.5 mg   11/5: Decreased evening olanzapine to 5 mg   11/11: Moved morning dose of olanzapine to the afternoon as patient appears more agitated in the afternoons/evenings  11/21: Started memantine 5 mg daily   11/26: Discontinued olanzapine 2.5 mg during the afternoon  12/2:   Discontinued memantine 5 mg, daily  12/30: Discontinued SIO     Care conference 10/18/24 with daughter Maria C and ex-wife Clifford  - Report that patient has always been paranoid since ex-wife first met him. She describes him always feeling like he is  getting ripped off.   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 They reported once incidence in which he made a bid on a job and the client paid him, but he never ended up finishing the job  - Wife and him  officially this year, and since selling the house several months ago, patient has been moving from hotel to hotel due to thinking people are out to get him   - When they were selling their house, patient threatened realtors and felt he was being ripped off   - Clifford reports that she started to be afraid to be around him alone  - When he was found in the hotel, he had a generator full of gasoline, binoculars, bullets, and hunting equipment.     10/21/24: updated daughter on Santos's treatment plan      10/23/24: updated daughter on Santos's treatment plan      10/25/24: updated daughter on Santos's treatment plan, including plan to try and get MRI brain done under sedation. She said that Santos's grandfather had dementia and his sister has a brain tumor.      10/28/24: updated daughter on Santos's MRI results. She is planning on coming into town soon.      Care conference 11/1/24 with daughters Maria C and Estefani and ex-wife Clifford  - Discussed dementia diagnosis   - Clifford asked about plans moving forward. Explained that applying for medical assistance is the first step. Explained that application processing time can be very variable. Informed family that Santos will most likely be staying on this inpatient unit during this time.   - Clifford expressed that their family would like to be the power  of /have conservatorship for Santos.   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 Reports that he has bills that he has not paid.   - Maria C is planning to move to [place], and Clifford currently lives in [place]. Family prefer that Santos would in a facility that are near these locations. Discussed with family that they can also try to request a specific location (memory care).   - Clifford reports that he had previously gotten help applying for his social security and medicare, but unsure if it had been approved.   - Informed family that there is a geriatric unit and there might be a transfer if there becomes availability on this unit.   - Family shared that he was completely different just two months ago.   - Estefani shared that his family found his medications in his old truck recently, expressed that it was likely that he was not taking his medications prior to his hospitalization.      11/6/24: updated Maria C on plan to try and transfer Santos to Geriatric unit.      11/11/24: updated Maria C on neurology consult      The risks, benefits, alternatives, and side effects were discussed and understood by the patient and other caregivers.     Medical Assessment and Plan     Medical diagnoses to be addressed this admission:    # Hip Pain  - New right hip pain, and mild pain in multiple joints. Moderate response to topical antiinflammatory gel and lidocaine patch.   - Medicine consulted for recommendations 12/20    # CHF  # Hypertension  - Continue PTA medications  Furosemide 40 mg daily  Lisinopril 40 mg daily  Metoprolol 50 mg daily  Amlodipine 10 mg daily  Clonidine 0.1 mg BID  - Pitting edema in lower extremities, not painful 3+: Medicine consulted for recommendations 12/20    # Hyperlipidemia  - Continue PTA Atorvastatin 40 mg     # Primary Hyperparathyroidism  # Hypercalcemia, hypophosphoremia   Increased Ca level to 10.9 and decreased Ph level to 2.3  in the ED. Suspicion patient's hypercalcemia could be contributing to symptoms of psychosis.  - Consulted endocrinology, who started cinacalcet 30 mg BID on 10/13  - 10/16 endocrinology recommended continuing to trend calcium and albumin to make sure patient does not become hypocalcemic and recommended holding cinacalcet   - 10/17, calcium and albumin wnl, endo recommended cinacalcet 30 mg once daily   - 10/21, per endo continue cincaclcet and recheck calcium and albumin on October 24  - 10/23: per endo, patient needs to follow up outpatient for further management of hyperparathyroidism      Medical course: Patient was physically examined by the ED prior to being transferred to the unit and was found to be medically stable and appropriate for admission.      Consults: Psychiatry, Endocrinology (follow-up of hyperthyroidism / hypercalcemia and hypertension), neurology     Checklist     Legal Status: Committed   MI Commitment with [facility]  File Number: [id number]  Start and expiration date of commitment: 10/24/24 - 04/24/25     [facility] meds: Haldol, Clozaril, Risperdal, Invega, Zyprexa, Seroquel, Abilify     PPS/CM:  Shelby Chowdary: [phone]  [email or web address].    Guardian/Conservator:  Clifford Aaron is currently emergency until 01/20/2025.       Safety Assessment:   Behavioral Orders   Procedures    Code 1 - Restrict to Unit    Routine Programming     As clinically indicated    Status 15     Every 15 minutes.    Status Individual Observation     SIO AT NIGHT     Patient SIO status reviewed with team/RN.  Please also refer to RN/team documentation for add'l detail.    -SIO staff to monitor following which have contributed to patient being on SIO:  Disruptive behavior at night     -When following observed, team will review discontinuation of SIO:  Less disruptive and redirectable at night     Order Specific Question:   CONTINUOUS 24 hours / day     Answer:   Other     Order Specific  Question:   Specify distance     Answer:   10 ft     Order Specific Question:   Indications for SIO     Answer:   Severe intrusiveness       Risk Assessment:  Risk for harm is low  Risk factors: impulsive and past behaviors  Protective factors: family      SIO: No    Disposition: Pending stabilization, medication optimization, & development of a safe discharge plan.     Attestations     This patient was seen and discussed with my attending physician.  Rocío Orellana MD  [facility] Psychiatry Resident  12/30/2024    Attestation:  This patient has been seen and evaluated by me, Pete Smith MD.  I have discussed this patient with the house staff team including the resident and/or medical student and I agree with the findings and plan in this note.    I have reviewed today's vital signs, medications, labs and imaging. Pete Smith MD , PhD.

## 2024-12-30 NOTE — PLAN OF CARE
"Team Note Due:  Monday    Assessment/Intervention/Current Symtoms and Care Coordination:  Chart review and met with team, discussed pt progress, symptomology, and response to treatment.  Discussed the discharge plan and any potential impediments to discharge. Clifford Aaron is currently emergency guardian/conservator until 01/20/2025.    Called and spoke with Nikki at [facility] to clarify reason for denial. She stated their RN had concerns about dementia with psychosis, history of hallucination, still on 1:1, high elopement risk. They feel he needs \"a skilled setting with 24-hour nursing\" and they cannot accommodate this.    Provided update to family on feedback from [facility]. Maria C requesting a care conference to discuss placement plan further. Provided availability and requested confirmation on what works best for family.    Discharge Plan or Goal:  Memory care facility     Barriers to Discharge:  Patient requires further psychiatric stabilization due to current symptomology, medication management with changes subject to provider, coordination with outside supports, and aftercare planning. Pt is under civil commitment.     Referral Status:    Memory Care facilities currently in process:  [facility]. Tour completed 11/27.  [facility] [place] [facility] [place]. Tour completed 11/29.  [facility]. Tour completed 11/30.    Memory Care facilities pending family review:  [facility].   [facility] [place].  [facility].  [facility].  [facility].  [facility].    Memory Care facilities declined:  [facility] (private pay only, no EW).  [facility] (private pay only, no EW).  [facility] [place] (no openings).  [facility]. Tour completed 11/29. Records sent 12/2/24. Declined 12/19/24 (don't feel they are an appropriate facility for " pt's needs).  Lorraine (): [email or web address]; [phone]  Isatu (director of nursing): [email or web address]  [facility].  Denied 12/26 (concerned about dementia with psychosis, history of hallucinations, high elopement risk, still on 1:1).  Nikki Noel: [email or web address]; Office [phone], Fax [phone]     Legal Status:  MI Commitment with [facility]  File Number: [id number]  Start and expiration date of commitment: 10/24/24 - 04/24/25    [facility] meds: Haldol, Clozaril, Risperdal, Invega, Zyprexa, Seroquel, Abilify    PPS/CM:  Shelby Chowdary: [phone]  [email or web address].    Contacts:  Maria C Aaron (Daughter): [phone]   Clifford Agnieszka (ex-wife): [phone]     No Del Angel (guardianship/conservatorship ): [phone]  [email or web address]     Upcoming Meetings and Dates/Important Information and next steps:  Follow up with family regarding list of Memory Care facilities  Discharge planning when appropriate  Pt does not qualify for MA per financial counselor on 11/8/2024. Pt will likely privately pay for Memory Care until he qualifies for MA/waivers in the future.  MNChoice Assessment needs to be rescheduled (only valid for 60 days, so ideally scheduled for after pt discharges and has an opportunity to apply for MA)  Contact Derrell Duff to do this ([phone]; [email or web address].)    Provisional Discharge and Change of Status needed at discharge

## 2024-12-30 NOTE — PLAN OF CARE
Problem: Psychotic Signs/Symptoms  Goal: Improved Behavioral Control (Psychotic Signs/Symptoms)  Outcome: Progressing  Intervention: Manage Behavior  Recent Flowsheet Documentation  Taken 12/30/2024 1630 by Tamie Fraser RN  De-Escalation Techniques: appropriate behavior reinforced     Problem: Anxiety  Goal: Anxiety Reduction or Resolution  Outcome: Progressing     Problem: Sleep Disturbance  Goal: Adequate Sleep/Rest  Outcome: Progressing   Goal Outcome Evaluation:    Pt was visible in the lounge watching TV and interacting with select peers. Pt  was frequently seeking out staff demanding to be discharge. Affect blunted, ftat. Mood irritable, restless. Pt denied pain or discomfort. Denies all mental health and psych symptoms. Nutrition and hydration adequate. Hygiene poor. Pt was compliant with medications and contracted for safety. No behavioral concerns noted this shift.

## 2024-12-30 NOTE — PLAN OF CARE
BEH IP Unit Acuity Rating Score (UARS)  Patient is given one point for every criteria they meet.    CRITERIA SCORING   On a 72 hour hold, court hold, committed, stay of commitment, or revocation. 1    Patient LOS on BEH unit exceeds 20 days. 1  LOS: 79   Patient under guardianship, 55+, otherwise medically complex, or under age 11. 1   Suicide ideation without relief of precipitating factors. 0   Current plan for suicide. 0   Current plan for homicide. 0   Imminent risk or actual attempt to seriously harm another without relief of factors precipitating the attempt. 1   Severe dysfunction in daily living (ex: complete neglect for self care, extreme disruption in vegetative function, extreme deterioration in social interactions). 1   Recent (last 7 days) or current physical aggression in the ED or on unit. 0   Restraints or seclusion episode in past 72 hours. 0   Recent (last 7 days) or current verbal aggression, agitation, yelling, etc., while in the ED or unit. 0   Active psychosis. 1   Need for constant or near constant redirection (from leaving, from others, etc).  0   Intrusive or disruptive behaviors. 1   Patient requires 3 or more hours of individualized nursing care per 8-hour shift (i.e. for ADLs, meds, therapeutic interventions). 1   TOTAL 8

## 2024-12-30 NOTE — PROVIDER NOTIFICATION
12/30/24 1226   Individualization/Patient Specific Goals   Patient Personal Strengths resourceful;resilient;positive vocational history;ability to maintain sobriety   Patient Vulnerabilities housing insecurity;lacks insight into illness;poor impulse control   Interprofessional Rounds   Summary Discussed patient progress and challenges with Memory Care placement.   Participants nursing;CTC;psychiatrist   Behavioral Team Discussion   Participants Dr. Smith; Dr. Juarez; Rocío Curry RN; Kylee Becerra MA [facility]   Progress Pt has progressed   Anticipated length of stay 60+ days   Continued Stay Criteria/Rationale Medication management; symptom stabilization; care coordination   Medical/Physical See H&P   Precautions See below   Plan Psychiatric assessment/Medication management. Therapeutic Milieu. Individual care planning and after care planning. Patient to participate in unit groups and activities. Individual and group support on unit.   Safety Plan Completed by unit therapist prior to discharge   Anticipated Discharge Disposition another healthcare facility     PRECAUTIONS AND SAFETY    Behavioral Orders   Procedures    Code 1 - Restrict to Unit    Routine Programming     As clinically indicated    Status 15     Every 15 minutes.       Safety  Safety WDL: WDL  Patient Location: lounge, patient room, own, hallway  Observed Behavior: calm  Observed Behavior (Comment): sitting  Airway Safety Measures: other (see comments)  Safety Measures: environmental rounds completed, safety rounds completed  Diversional Activity: television  De-Escalation Techniques: appropriate behavior reinforced, diversional activity encouraged, medication administered, stimulation decreased  Suicidality: Status 15  Assault: status continuous sight  Elopement Assessment: Loitering near exit doors, Statements about wanting to leave  Elopement Interventions: status 15

## 2024-12-30 NOTE — PLAN OF CARE
Problem: Sleep Disturbance  Goal: Adequate Sleep/Rest  Outcome: Progressing    Pt appears to have slept for 4.25 hours. PRN Tylenol was given for generalized body pain rated 5/10, and was effective. Pt  was occasionally  intrusive, but was easily redirected. He denies anxiety, depression, and SI/SIB. Pt is on SIO, 1:1 for severe intrusiveness on night shift only. 15 minutes safety checks were in place. Staff will continue to offer support to pt.

## 2024-12-31 PROCEDURE — 250N000013 HC RX MED GY IP 250 OP 250 PS 637

## 2024-12-31 PROCEDURE — 99231 SBSQ HOSP IP/OBS SF/LOW 25: CPT | Mod: GC | Performed by: PSYCHIATRY & NEUROLOGY

## 2024-12-31 PROCEDURE — 124N000002 HC R&B MH UMMC

## 2024-12-31 RX ADMIN — Medication 25 MG: at 20:22

## 2024-12-31 RX ADMIN — CLONIDINE HYDROCHLORIDE 0.1 MG: 0.1 TABLET ORAL at 08:19

## 2024-12-31 RX ADMIN — Medication 1 PATCH: at 08:23

## 2024-12-31 RX ADMIN — FUROSEMIDE 40 MG: 40 TABLET ORAL at 08:19

## 2024-12-31 RX ADMIN — ATORVASTATIN CALCIUM 40 MG: 20 TABLET, FILM COATED ORAL at 08:19

## 2024-12-31 RX ADMIN — CINACALCET 30 MG: 30 TABLET ORAL at 08:19

## 2024-12-31 RX ADMIN — CLONIDINE HYDROCHLORIDE 0.1 MG: 0.1 TABLET ORAL at 20:23

## 2024-12-31 RX ADMIN — Medication 1250 MCG: at 14:26

## 2024-12-31 RX ADMIN — LISINOPRIL 40 MG: 10 TABLET ORAL at 08:19

## 2024-12-31 RX ADMIN — MELATONIN TAB 3 MG 3 MG: 3 TAB at 20:22

## 2024-12-31 RX ADMIN — METOPROLOL SUCCINATE 50 MG: 50 TABLET, EXTENDED RELEASE ORAL at 08:19

## 2024-12-31 RX ADMIN — OLANZAPINE 5 MG: 5 TABLET, ORALLY DISINTEGRATING ORAL at 20:23

## 2024-12-31 RX ADMIN — AMLODIPINE BESYLATE 10 MG: 5 TABLET ORAL at 08:19

## 2024-12-31 ASSESSMENT — ACTIVITIES OF DAILY LIVING (ADL)
ADLS_ACUITY_SCORE: 66
DRESS: INDEPENDENT
ADLS_ACUITY_SCORE: 66
ADLS_ACUITY_SCORE: 66
HYGIENE/GROOMING: INDEPENDENT
DRESS: INDEPENDENT
ADLS_ACUITY_SCORE: 66
HYGIENE/GROOMING: INDEPENDENT
ADLS_ACUITY_SCORE: 66
ORAL_HYGIENE: INDEPENDENT
ADLS_ACUITY_SCORE: 66
LAUNDRY: WITH SUPERVISION
ADLS_ACUITY_SCORE: 66
ORAL_HYGIENE: INDEPENDENT
ADLS_ACUITY_SCORE: 66
ADLS_ACUITY_SCORE: 66

## 2024-12-31 NOTE — PLAN OF CARE
Problem: Sleep Disturbance  Goal: Adequate Sleep/Rest  Outcome: Progressing   Goal Outcome Evaluation:  Patient was observed to have slept for 5.5 hours. No acute events thus far during the night. No complaints of pain or requested prn's. Respirations regular and non-labored. No behavioral issues. Routine q 15 minute safety checks completed, no acute immediate concerns noted.

## 2024-12-31 NOTE — PLAN OF CARE
Team Note Due:  Monday    Assessment/Intervention/Current Symtoms and Care Coordination:  Chart review and met with team, discussed pt progress, symptomology, and response to treatment.  Discussed the discharge plan and any potential impediments to discharge. Clifford Aaron is currently emergency guardian/conservator until 01/20/2025.    Family coming for in person care conference on Thursday at 11:30am. Will discuss plans for placement and request update on guardianship/conservatorship.    Discharge Plan or Goal:  Memory care facility     Barriers to Discharge:  Patient requires further psychiatric stabilization due to current symptomology, medication management with changes subject to provider, coordination with outside supports, and aftercare planning. Pt is under civil commitment.     Referral Status:    Memory Care facilities currently in process:  [facility]. Tour completed 11/27.  [facility]. Tour completed 11/29.  [facility]. Tour completed 11/30.    Memory Care facilities pending family review[facility]  [facility].   [facility].  [facility].  [facility].  [facility].  [facility].    Memory Care facilities declined:  [facility] (private pay only, no EW).  [facility] (private pay only, no EW).  [facility] (no openings).  [facility]. Tour completed 11/29. Records sent 12/2/24. Declined 12/19/24 (don't feel they are an appropriate facility for pt's needs).  Lorraine (): [email or web address]; [phone]  Isatu (director of nursing): [email or web address]  [facility] [place] [place].  Denied 12/26 (concerned about dementia with psychosis, history of hallucinations, high elopement risk, still on 1:1).  Nikki Noel:  [email or web address]; Office [phone], Fax [phone]     Legal Status:  MI Commitment with [facility]  File Number: [id number]  Start and expiration date of commitment: 10/24/24 - 04/24/25    [place] meds: Haldol, Clozaril, Risperdal, Invega, Zyprexa, Seroquel, Abilify    PPS/CM:  Shelby Chowdary: [phone]  [email or web address].    Contacts:  Maria C Agnieszka (Daughter): [phone]   Clifford Agnieszka (ex-wife): [phone]     No Del Angel (guardianship/conservatorship ): [phone]  [email or web address]     Upcoming Meetings and Dates/Important Information and next steps:  Follow up with family regarding list of Memory Care facilities  Discharge planning when appropriate  Pt does not qualify for MA per financial counselor on 11/8/2024. Pt will likely privately pay for Memory Care until he qualifies for MA/waivers in the future.  MNChoice Assessment needs to be rescheduled (only valid for 60 days, so ideally scheduled for after pt discharges and has an opportunity to apply for MA)  Contact Derrell Duff to do this ([phone]; [email or web address].)    Provisional Discharge and Change of Status needed at discharge

## 2024-12-31 NOTE — PROGRESS NOTES
"  ----------------------------------------------------------------------------------------------------------  [facility]  Psychiatry Progress Note  Hospital Day #80     Interim History:     The patient's care was discussed with the treatment team and chart notes were reviewed.    Identifier: Estevan Aaron is a 64 year old male with previous psychiatric diagnoses of  generalized anxiety disorder, Neurocognitive disorder, admitted from the ED 10/12/2024 due to concern for HI and psychosis in the context of medical issues (hyperthyroidism, hypercalcemia) psychosocial stressors including family dynamics with recent possible divorce.    Sleep: 5.5 hours (12/31/24 0600)  Scheduled medications: Took all scheduled medications as prescribed  Psychiatric PRN medications: None     Staff Report:   Santos was visible in the lounge watching TV and interacting with select peers. He was frequently seeking out staff demanding to be discharge. Affect blunted, ftat. Mood irritable, restless. Santos denied pain or discomfort. Denies all mental health and psych symptoms. Nutrition and hydration adequate. Hygiene poor. Santos was compliant with medications and contracted for safety     See staff notes for more information     Subjective:     Patient Interview:  aSntos was meet while pacing in the milieu. He mentions to feel good today. While walking Santos starts talking about his future \"meetings\" and progressively got frustrated when trying to articulate his responses about this. He mentions that he would like for the attending psychiatrist to do a \"clean slate\", so he will be able to have more meetings. We interpret this as him talking about future discharge placement and team will update him with more information as it comes.    ROS:  See above     Objective:     Vitals:  /70   Pulse 61   Temp 98.2  F (36.8  C)   Resp 17   Wt 110.6 kg (243 lb 12.8 oz)   SpO2 98%   BMI 39.35 kg/m  "     Allergies:  No Known Allergies    Current Medications:  Scheduled:  Current Facility-Administered Medications   Medication Dose Route Frequency Provider Last Rate Last Admin    acetaminophen (TYLENOL) tablet 650 mg  650 mg Oral Q4H PRN Nikki Caceres MD   650 mg at 12/30/24 0418    alum & mag hydroxide-simethicone (MAALOX) suspension 30 mL  30 mL Oral Q4H PRN Nikki Caceres MD   30 mL at 10/17/24 0837    amLODIPine (NORVASC) tablet 10 mg  10 mg Oral Daily Presley Barrera MD   10 mg at 12/31/24 0819    atorvastatin (LIPITOR) tablet 40 mg  40 mg Oral Daily Nikki Caceres MD   40 mg at 12/31/24 0819    cholecalciferol (VITAMIN D3) capsule 1,250 mcg  1,250 mcg Oral Q7 Days SirishaEveliaDO   1,250 mcg at 12/24/24 1252    cinacalcet (SENSIPAR) tablet 30 mg  30 mg Oral Daily Presley Barrera MD   30 mg at 12/31/24 0819    cloNIDine (CATAPRES) tablet 0.1 mg  0.1 mg Oral BID Presley Barrera MD   0.1 mg at 12/31/24 0819    diclofenac (VOLTAREN) 1 % topical gel 2 g  2 g Topical 4x Daily PRN Rocío Lopez MD   2 g at 12/22/24 2032    furosemide (LASIX) tablet 40 mg  40 mg Oral Daily Presley Barrera MD   40 mg at 12/31/24 0819    gabapentin (NEURONTIN) capsule 100 mg  100 mg Oral Q6H PRN Nikki Caceres MD   100 mg at 12/24/24 0046    hydrocortisone (CORTAID) 0.5 % cream   Topical Daily PRN Savi Chamorro MD        Lidocaine (LIDOCARE) 4 % Patch 1 patch  1 patch Transdermal Q24H PRN Rocío Lopez MD   1 patch at 12/22/24 2023    lisinopril (ZESTRIL) tablet 40 mg  40 mg Oral Daily Presley Barrera MD   40 mg at 12/31/24 0819    melatonin tablet 3 mg  3 mg Oral At Bedtime Presley Barrera MD   3 mg at 12/30/24 2113    metoprolol succinate ER (TOPROL XL) 24 hr tablet 50 mg  50 mg Oral Daily Presley Barrera MD   50 mg at 12/31/24 0819    nicotine (NICODERM CQ) 14 MG/24HR 24 hr patch 1 patch  1 patch Transdermal Daily Evelia Grimm DO   1 patch at 12/31/24 0823     nicotine (NICORETTE) gum 2 mg  2 mg Buccal Q1H PRN González Garcia MD   2 mg at 10/24/24 1254    OLANZapine zydis (zyPREXA) ODT tab 5 mg  5 mg Oral At Bedtime González Garcia MD   5 mg at 12/30/24 2112    Or    OLANZapine (zyPREXA) injection 10 mg  10 mg Intramuscular At Bedtime González Garcia MD        OLANZapine (zyPREXA) tablet 5 mg  5 mg Oral TID PRN Evelia Grimm DO   5 mg at 11/24/24 0436    Or    OLANZapine (zyPREXA) injection 5 mg  5 mg Intramuscular TID PRN Evelia Grimm DO        polyethylene glycol (MIRALAX) Packet 17 g  17 g Oral Daily PRN Nikki Caceres MD        traZODone (DESYREL) half-tab 25 mg  25 mg Oral At Bedtime Rocío Lopez MD   25 mg at 12/30/24 2111    traZODone (DESYREL) half-tab 25 mg  25 mg Oral At Bedtime PRN Evelia Grimm DO   25 mg at 12/24/24 0046       PRN:  Current Facility-Administered Medications   Medication Dose Route Frequency Provider Last Rate Last Admin    acetaminophen (TYLENOL) tablet 650 mg  650 mg Oral Q4H PRN Nikki Caceres MD   650 mg at 12/30/24 0418    alum & mag hydroxide-simethicone (MAALOX) suspension 30 mL  30 mL Oral Q4H PRN Nikki Caceres MD   30 mL at 10/17/24 0837    amLODIPine (NORVASC) tablet 10 mg  10 mg Oral Daily Presley Barrera MD   10 mg at 12/31/24 0819    atorvastatin (LIPITOR) tablet 40 mg  40 mg Oral Daily Nikki Caceres MD   40 mg at 12/31/24 0819    cholecalciferol (VITAMIN D3) capsule 1,250 mcg  1,250 mcg Oral Q7 Days Evelia Grimm DO   1,250 mcg at 12/24/24 1252    cinacalcet (SENSIPAR) tablet 30 mg  30 mg Oral Daily Presley Barrera MD   30 mg at 12/31/24 0819    cloNIDine (CATAPRES) tablet 0.1 mg  0.1 mg Oral BID Presley Barrera MD   0.1 mg at 12/31/24 0819    diclofenac (VOLTAREN) 1 % topical gel 2 g  2 g Topical 4x Daily PRN Rocío Lopez MD   2 g at 12/22/24 2032    furosemide (LASIX) tablet 40 mg  40 mg Oral Daily Presley Barrera MD   40 mg at 12/31/24 0819    gabapentin  (NEURONTIN) capsule 100 mg  100 mg Oral Q6H PRN Nikki Caceres MD   100 mg at 12/24/24 0046    hydrocortisone (CORTAID) 0.5 % cream   Topical Daily PRN Savi Chamorro MD        Lidocaine (LIDOCARE) 4 % Patch 1 patch  1 patch Transdermal Q24H PRN Rocío Lopez MD   1 patch at 12/22/24 2023    lisinopril (ZESTRIL) tablet 40 mg  40 mg Oral Daily Presley Barrera MD   40 mg at 12/31/24 0819    melatonin tablet 3 mg  3 mg Oral At Bedtime Presley Barrera MD   3 mg at 12/30/24 2113    metoprolol succinate ER (TOPROL XL) 24 hr tablet 50 mg  50 mg Oral Daily Presley Barrera MD   50 mg at 12/31/24 0819    nicotine (NICODERM CQ) 14 MG/24HR 24 hr patch 1 patch  1 patch Transdermal Daily Evelia Grimm DO   1 patch at 12/31/24 0823    nicotine (NICORETTE) gum 2 mg  2 mg Buccal Q1H PRN González Garcia MD   2 mg at 10/24/24 1254    OLANZapine zydis (zyPREXA) ODT tab 5 mg  5 mg Oral At Bedtime González Garcia MD   5 mg at 12/30/24 2112    Or    OLANZapine (zyPREXA) injection 10 mg  10 mg Intramuscular At Bedtime González Garcia MD        OLANZapine (zyPREXA) tablet 5 mg  5 mg Oral TID PRN Evelia Grimm DO   5 mg at 11/24/24 0436    Or    OLANZapine (zyPREXA) injection 5 mg  5 mg Intramuscular TID PRN Evelia Grimm DO        polyethylene glycol (MIRALAX) Packet 17 g  17 g Oral Daily PRN Nikki Caceres MD        traZODone (DESYREL) half-tab 25 mg  25 mg Oral At Bedtime Rocío Lopez MD   25 mg at 12/30/24 2111    traZODone (DESYREL) half-tab 25 mg  25 mg Oral At Bedtime PRN Evelia Grimm DO   25 mg at 12/24/24 0046     Labs and Imaging:  Data this admission:  - CBC unremarkable  - CMP unremarkable  - TSH normal  - UDS negative  - Vit D low  - Hgb A1c 5.9 (10/13/24)  - Lipids unremarkable  - Vit B12 normal  - Folate normal  - Urinalysis unremarkable  - EKG normal sinus rhythm, QTc 390 ms  - Head CT showed no acute changes  - HIV non reactive  - Treponema antibody non  "reactive  - ESR wnl   - Ceruloplasmin wnl   - BOOGIE negative  - Lyme negative      Parathyroid hormone:   10/13: 85     Calcium:  10/14: 11.4  10/16: 10.3  10/17: 10.3  10/19: 9.8  10/21: 10.6  10/23: 10.3     Albumin:  10/14: 4.3  10/16: 4.3  10/17: 4.1  10/19: 4.2  10/21: 4.5  10/23: 4.3      CXR:   Impression:   1. No definite radiographic evidence of asbestosis. If clinically  indicated, consider evaluation with high resolution chest CT.  2. Nonspecific left costophrenic blunting. Given symmetrically low  lung volumes may be related to poor inspiratory effort/timing.  3. Pulmonary vascular congestion.     MRI:   IMPRESSION:  1. No acute intracranial pathology.  2. No focal lesion or structural abnormality.   3. Symmetric frontoparietal cortical volume loss slightly more than  expected for age.     Mental Status Exam:   Oriented to: Oriented to self only  General:  Awake and Alert  Appearance:  appears stated age, Grooming is adequate, and Dressed in his own clothes  Behavior/Attitude:  Disengaged, Easy to redirect, and Easily distracted  Eye Contact: Downcast distracted  Psychomotor: No evidence of tics, dystonia, or tardive dyskinesia  no catatonia present  Speech:  appropriate volume/tone, talkative, and spontaneous , apraxia of speech  Language: Fluent in English with appropriate syntax and vocabulary.  Mood:  \"good\"  Affect:  labile  Thought Process:  perseverative, tangential, and confused  Thought Content:   did not assess SI/HI/ delusion of confusion . Patient denies paranoia  Associations:  loose  Insight:  impaired   Judgment:  impaired   Impulse control: limited  Attention Span:   mildly decreased  Concentration:   distractible  Recent and Remote Memory:   remote memory intact, recent memory impaired  Fund of Knowledge: average  Muscle Strength and Tone: normal  Gait and Station: Normal     Psychiatric Assessment     Estevan Aaron is a 64 year old male with previous psychiatric diagnoses of GEORGINA admitted " from the ER on 10/12/2024 due to concern for HI and psychosis in the context of medical issues (hyperthyroidism, hypercalcemia), psychosocial stressors including with recent divorce and selling his home. This is the patient's first psychiatric hospitalization. Significant symptoms on admission included delusions of persecution with grandiose beliefs, homicidal ideations, as well as disorganized thinking and behavior. The MSE on admission was pertinent for a thought process which was perseverative, circumstantial, tangential, disorganized and tangential; with rambling and looseness of associations. Psychological contributions to presentation included lack of insight. Social factors contributing to presentation included isolation, recent divorce, selling his house, and moving from hotel to hotel. Biological contributions to presentation included a history of hyperparathyroidism with chronic hypercalcemia per charts as well as a history of methamphetamine use per collateral from ex-wife.     History was difficult to obtain due to the patient's severe disorganized thinking on interview; he was observed with persecutory delusions pertaining to the realtor and gang members, disorganized behavior as these delusions have led him to flee to different hotels to stay safe/ has called  complaining of being targeted and auditory hallucinations of voices for the past 12 months. Per collateral from ex-wife, Santos has had paranoid ideations since they first met. He has always felt like people are out to get him or trying to rip him off. She says that he has had visual hallucinations (he will point things out that the rest of the family can't see) for a long time, but the family would just go along with him to avoid making him angry. This timeline and presentation could be consistent with diagnosis of paranoid personality disorder. Things began to worsen around the beginning of the COVID pandemic, when Santos became more isolated and  the family started to notice cognitive issues such as impaired memory.     Immediately prior to this hospitalization, the ex-wife found Santos in a hotel room with a generator full of gasoline, binoculars, and hunting equipment. This time course does not suggest acute psychosis, however given the patient has never had a full psychiatric work-up, we completed a first-episode psychosis work-up. Other things that could be contributing to his presentation is hyperparathyroidism with hypercalcemia. However, patient's calcium returned to normal limits on 10/16, and patient continues to have disorganized behavior unrelated to calcium elevation, so likely this is not a significant contributing factor to patient's presentation.      Currently, Santos is at times disoriented, but easily re-directable. Presents with some difficulty of word articulation, which contributes to his frustration when interacting with psych team, as he finds it difficult to explain himself. He is able to take care of his ADLs without much assistance and he is mostly independent in the unit. On the other hand, his confused behavior tend to increase in the evenings, seemingly related to  Neurocognitive Disorder diagnosis, and this could evolved to be his new baseline. Either way, Santos does not present as a danger to himself or other so his SIO was discontinued. Santos does not present with any new episodes of physical agitation and is able to attend groups and interact with peers without major altercations. Patient currently is stable with current neuroleptic dose, patient probably wont benefit from any modification in current therapy.     Goal of treatment:  Procure a memory care placement.        Psychiatric Plan by Diagnosis   Today's changes:  - none    # Major Neurocognitive Disorder  # Rule out paranoid personality disorder  1. Medications:  - Olanzapine 5 mg at bedtime  - Neurology consult 11/8/24, recommend outpatient follow-up and neuropsychiatric  testing     2. Pertinent Labs/Monitoring:   - Re-eval Calcium parameters tomorrow     3. Additional Plans:  - Patient will be treated in therapeutic milieu with appropriate individual and group therapies as described  - Patient is Commitment with [facility]  - [facility] meds: Haldol, Clozaril, Risperdal, Invega, Zyprexa, Seroquel, Abilify  - 12/12/24: Psych team had phone call meeting with Memory Care facility personnel  - Pending responses primary from [facility] and [facility]  - MNChoice Assessment needs to be rescheduled (only valid for 60 days, so ideally scheduled for after pt discharges and has an opportunity to apply for MA)   - Care meeting on Thursday      # Unspecified anxiety vs Generalized Anxiety Disorder  - Monitor for symptoms.  - Fluoxetine held due to suspicion of ongoing manic symptoms     Psychiatric Hospital Course:      Estevan Aaron was admitted to [facility] on a 72 hour hold.   Medications:  PTA fluoxetine was held due to concern for worsening of zelalem   New medications started at the time of admission include Zyprexa.   Increased olanzapine 10 mg at bedtime was to 10 mg BID (10/14)   Increased olanzapine 10 mg BID to 10 mg during day and 15 mg at bedtime (10/15)  10/21: increased olanzapine pm dose from 15 to 20 mg  10/23: started melatonin 3 mg at 7 pm to help patient with circadian rhythm as he has been staying up throughout the night and sleeping a lot during the day and there is some concern for delirium   10/24: consulted anesthesia for MRI brain   10/28: MRI brain complete, decrease AM olanzapine from 10 to 5 mg  10/29: Morning olanzapine decreased to 2.5 mg, evening olanzapine decreased to 15 mg   11/1: Decreased evening olanzapine to 10 mg   11/4: Decreased evening olanzapine to 7.5 mg   11/5: Decreased evening olanzapine to 5 mg   11/11: Moved morning dose of olanzapine to the afternoon as patient appears more agitated in the afternoons/evenings  11/21:  Started memantine 5 mg daily   11/26: Discontinued olanzapine 2.5 mg during the afternoon  12/2:   Discontinued memantine 5 mg, daily  12/30: Discontinued SIO     Care conference 10/18/24 with daughter Maria C and ex-wife Clifford  - Report that patient has always been paranoid since ex-wife first met him. She describes him always feeling like he is getting ripped off.   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 They reported once incidence in which he made a bid on a job and the client paid him, but he never ended up finishing the job  - Wife and him  officially this year, and since selling the house several months ago, patient has been moving from hotel to hotel due to thinking people are out to get him   - When they were selling their house, patient threatened realtors and felt he was being ripped off   - Clifford reports that she started to be afraid to be around him alone  - When he was found in the hotel, he had a generator full of gasoline, binoculars, bullets, and hunting equipment.     10/21/24: updated daughter on Santos's treatment plan      10/23/24: updated daughter on Santos's treatment plan      10/25/24: updated daughter on Santos's treatment plan, including plan to try and get MRI brain done under sedation. She said that Santos's grandfather had dementia and his sister has a brain tumor.      10/28/24: updated daughter on Santos's MRI results. She is planning on coming into town soon.      Care conference 11/1/24 with daughters Johanna and ex-wife Clifford  - Discussed  dementia diagnosis   - Clifford asked about plans moving forward. Explained that applying for medical assistance is the first step. Explained that application processing time can be very variable. Informed family that Santos will most likely be staying on this inpatient unit during this time.   - Clifford expressed that their family would like to be the power of /have conservatorship for Santos.   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 Reports that he has bills that he has not paid.   - Maria C is planning to move to [place], and Clifford currently lives in [place]. Family prefer that Santos would in a facility that are near these locations. Discussed with family that they can also try to request a specific location (memory care).   - Clifford reports that he had previously gotten help applying for his social security and medicare, but unsure if it had been approved.   - Informed family that there is a geriatric unit and there might be a transfer if there becomes availability on this unit.   - Family shared that he was completely different just two months ago.   - Estefani shared that his family found his medications in his old truck recently, expressed that it was likely that he was not taking his medications prior to his hospitalization.      11/6/24: updated Maria C on plan to try and transfer Santos to Geriatric unit.      11/11/24: updated Maria C on neurology consult      The risks, benefits, alternatives, and side effects were discussed and understood by the patient and other caregivers.     Medical Assessment and Plan     Medical diagnoses to be addressed this admission:    # Hip Pain  - New right hip pain, and mild pain in multiple joints. Moderate response to topical antiinflammatory gel and lidocaine patch.   - Medicine consulted for recommendations 12/20    # CHF  # Hypertension  - Continue PTA medications  Furosemide 40 mg daily  Lisinopril 40 mg  daily  Metoprolol 50 mg daily  Amlodipine 10 mg daily  Clonidine 0.1 mg BID  - Pitting edema in lower extremities, not painful 3+: Medicine consulted for recommendations 12/20    # Hyperlipidemia  - Continue PTA Atorvastatin 40 mg     # Primary Hyperparathyroidism  # Hypercalcemia, hypophosphoremia   Increased Ca level to 10.9 and decreased Ph level to 2.3 in the ED. Suspicion patient's hypercalcemia could be contributing to symptoms of psychosis.  - Consulted endocrinology, who started cinacalcet 30 mg BID on 10/13  - 10/16 endocrinology recommended continuing to trend calcium and albumin to make sure patient does not become hypocalcemic and recommended holding cinacalcet   - 10/17, calcium and albumin wnl, endo recommended cinacalcet 30 mg once daily   - 10/21, per endo continue cincaclcet and recheck calcium and albumin on October 24  - 10/23: per endo, patient needs to follow up outpatient for further management of hyperparathyroidism      Medical course: Patient was physically examined by the ED prior to being transferred to the unit and was found to be medically stable and appropriate for admission.      Consults: Psychiatry, Endocrinology (follow-up of hyperthyroidism / hypercalcemia and hypertension), neurology     Checklist     Legal Status: Committed   MI Commitment with [facility]  File Number: [id number]  Start and expiration date of commitment: 10/24/24 - 04/24/25     [facility] meds: Haldol, Clozaril, Risperdal, Invega, Zyprexa, Seroquel, Abilify     PPS/CM:  Shelby Chowdary: [phone]  [email or web address].    Guardian/Conservator:  Clifford Aaron is currently emergency until 01/20/2025.       Safety Assessment:   Behavioral Orders   Procedures    Code 1 - Restrict to Unit    Routine Programming     As clinically indicated    Status 15     Every 15 minutes.       Risk Assessment:  Risk for harm is low  Risk factors: impulsive and past behaviors  Protective factors: family       SIO: No    Disposition: Pending stabilization, medication optimization, & development of a safe discharge plan.     Attestations     This patient was seen and discussed with my attending physician.  Rocío Orellana MD  [facility] Psychiatry Resident  12/31/2024

## 2024-12-31 NOTE — PLAN OF CARE
"Pt denies SI.  Pt needs and rec'd reminders for meals.  Pt few times observed sleeping in lounge.  Pt at times having difficulty asking or expressing himself.  Pt said things that are missing some words. Pt medications compliant no bx issues this shift.  Pt sister came to visit.    Problem: Adult Behavioral Health Plan of Care  Goal: Plan of Care Review  Outcome: Not Progressing  Goal: Patient-Specific Goal (Individualization)  Description: You can add care plan individualizations to a care plan. Examples of Individualization might be:  \"Parent requests to be called daily at 9am for status\", \"I have a hard time hearing out of my right ear\", or \"Do not touch me to wake me up as it startles  me\".  Outcome: Not Progressing  Goal: Adheres to Safety Considerations for Self and Others  Outcome: Not Progressing  Intervention: Develop and Maintain Individualized Safety Plan  Recent Flowsheet Documentation  Taken 12/31/2024 1324 by Catherine Szymanski RN  Safety Measures: safety rounds completed  Intervention: Develop and Maintain Individualized Safety Plan  Recent Flowsheet Documentation  Taken 12/31/2024 1324 by Catherine Szymanski RN  Safety Measures: safety rounds completed  Goal: Absence of New-Onset Illness or Injury  Outcome: Not Progressing  Intervention: Identify and Manage Fall Risk  Recent Flowsheet Documentation  Taken 12/31/2024 1324 by Catherine Szymanski RN  Safety Measures: safety rounds completed  Goal: Optimized Coping Skills in Response to Life Stressors  Outcome: Not Progressing  Goal: Develops/Participates in Therapeutic [place] to Support Successful Transition  Outcome: Not Progressing     Problem: Psychotic Signs/Symptoms  Goal: Improved Behavioral Control (Psychotic Signs/Symptoms)  Outcome: Not Progressing  Goal: Optimal Cognitive Function (Psychotic Signs/Symptoms)  Outcome: Not Progressing  Goal: Increased Participation and Engagement (Psychotic Signs/Symptoms)  Outcome: Not Progressing  Goal: Improved Mood " Symptoms (Psychotic Signs/Symptoms)  Outcome: Not Progressing  Goal: Improved Psychomotor Symptoms (Psychotic Signs/Symptoms)  Outcome: Not Progressing  Goal: Decreased Sensory Symptoms (Psychotic Signs/Symptoms)  Outcome: Not Progressing  Goal: Improved Sleep (Psychotic Signs/Symptoms)  Outcome: Not Progressing  Goal: Enhanced Social, Occupational or Functional Skills (Psychotic Signs/Symptoms)  Outcome: Not Progressing     Problem: Depression  Goal: Improved Mood  Outcome: Not Progressing     Problem: Suicidal Behavior  Goal: Suicidal Behavior is Absent or Managed  Outcome: Not Progressing     Problem: Anxiety  Goal: Anxiety Reduction or Resolution  Outcome: Not Progressing     Problem: Sleep Disturbance  Goal: Adequate Sleep/Rest  Outcome: Not Progressing     Problem: Seclusion/Restraint, Behavioral  Goal: Seclusion/Behavioral Restraint Goal: Absence of Harm or Injury  Outcome: Not Progressing  Intervention: Implement Least Restrictive Safety Strategies  Recent Flowsheet Documentation  Taken 12/31/2024 1324 by Catherine Szymanski RN  Safety Measures: safety rounds completed     Problem: Pain Acute  Goal: Optimal Pain Control and Function  Outcome: Not Progressing     Problem: Manic or Hypomanic Signs/Symptoms  Goal: Improved Impulse Control (Manic/Hypomanic Signs/Symptoms)  Outcome: Not Progressing  Goal: Optimized Cognitive Function (Manic/Hypomanic Signs/Symptoms)  Outcome: Not Progressing  Goal: Improved Mood Symptoms (Manic/Hypomanic Signs/Symptoms)  Outcome: Not Progressing  Goal: Optimized Nutrition Intake (Manic or Hypomanic Signs/Symptoms)  Outcome: Not Progressing  Goal: Improved Psychomotor Symptoms (Manic/Hypomanic Signs/Symptoms)  Outcome: Not Progressing  Goal: Improved Sleep (Manic/Hypomanic Signs/Symptoms)  Outcome: Not Progressing  Goal: Enhanced Social, Occupational or Functional Skills (Manic/Hypomanic Signs/Symptoms)  Outcome: Not Progressing   Goal Outcome Evaluation:

## 2025-01-01 PROCEDURE — 250N000013 HC RX MED GY IP 250 OP 250 PS 637

## 2025-01-01 PROCEDURE — 124N000002 HC R&B MH UMMC

## 2025-01-01 RX ADMIN — LISINOPRIL 40 MG: 10 TABLET ORAL at 08:49

## 2025-01-01 RX ADMIN — CINACALCET 30 MG: 30 TABLET ORAL at 08:49

## 2025-01-01 RX ADMIN — ATORVASTATIN CALCIUM 40 MG: 20 TABLET, FILM COATED ORAL at 08:49

## 2025-01-01 RX ADMIN — METOPROLOL SUCCINATE 50 MG: 50 TABLET, EXTENDED RELEASE ORAL at 08:48

## 2025-01-01 RX ADMIN — Medication 25 MG: at 21:46

## 2025-01-01 RX ADMIN — OLANZAPINE 5 MG: 5 TABLET, ORALLY DISINTEGRATING ORAL at 21:46

## 2025-01-01 RX ADMIN — FUROSEMIDE 40 MG: 40 TABLET ORAL at 08:49

## 2025-01-01 RX ADMIN — Medication 1 PATCH: at 08:54

## 2025-01-01 RX ADMIN — AMLODIPINE BESYLATE 10 MG: 5 TABLET ORAL at 08:49

## 2025-01-01 RX ADMIN — ACETAMINOPHEN 650 MG: 325 TABLET, FILM COATED ORAL at 21:52

## 2025-01-01 RX ADMIN — CLONIDINE HYDROCHLORIDE 0.1 MG: 0.1 TABLET ORAL at 21:46

## 2025-01-01 RX ADMIN — CLONIDINE HYDROCHLORIDE 0.1 MG: 0.1 TABLET ORAL at 08:49

## 2025-01-01 RX ADMIN — MELATONIN TAB 3 MG 3 MG: 3 TAB at 21:46

## 2025-01-01 ASSESSMENT — ACTIVITIES OF DAILY LIVING (ADL)
HYGIENE/GROOMING: INDEPENDENT
ADLS_ACUITY_SCORE: 66
DRESS: INDEPENDENT
ADLS_ACUITY_SCORE: 66
ORAL_HYGIENE: INDEPENDENT
ADLS_ACUITY_SCORE: 66

## 2025-01-01 NOTE — PLAN OF CARE
Problem: Sleep Disturbance  Goal: Adequate Sleep/Rest  Outcome: Progressing   Goal Outcome Evaluation:  Patient had an uneventful night, asleep at the beginning of the shift and for majority of the night. No behavioral issues. No complaints of pain. No requested or administered prn's. Respirations regular and non-labored. Routine safety checks in place q 15 minutes. Appears to have slept for 5.75 hours.

## 2025-01-01 NOTE — PLAN OF CARE
Patient was out visible, appropriate, continues to present with lack of insight to situation, affect was bright consistent with mood, participated in group activities, intake and hygiene was adequate, denies any concerns, vitals were stable, he was medication compliant.         Problem: Adult Behavioral Health Plan of Care  Goal: Adheres to Safety Considerations for Self and Others  Outcome: Progressing  Flowsheets (Taken 12/31/2024 2230)  Adheres to Safety Considerations for Self and Others: making progress toward outcome  Intervention: Develop and Maintain Individualized Safety Plan  Recent Flowsheet Documentation  Taken 12/31/2024 2000 by Alice Sethi RN  Safety Measures: safety rounds completed  Intervention: Develop and Maintain Individualized Safety Plan  Recent Flowsheet Documentation  Taken 12/31/2024 2000 by Alice Sethi RN  Safety Measures: safety rounds completed   Goal Outcome Evaluation:    Plan of Care Reviewed With: patient

## 2025-01-01 NOTE — PLAN OF CARE
"  Problem: Adult Behavioral Health Plan of Care  Goal: Patient-Specific Goal (Individualization)  Description: You can add care plan individualizations to a care plan. Examples of Individualization might be:  \"Parent requests to be called daily at 9am for status\", \"I have a hard time hearing out of my right ear\", or \"Do not touch me to wake me up as it startles  me\".  Outcome: Progressing  Goal: Adheres to Safety Considerations for Self and Others  Outcome: Progressing  Intervention: Develop and Maintain Individualized Safety Plan  Recent Flowsheet Documentation  Taken 1/1/2025 1100 by Rocío Curry, RN  Safety Measures: safety rounds completed  Intervention: Develop and Maintain Individualized Safety Plan  Recent Flowsheet Documentation  Taken 1/1/2025 1100 by Rocío Curry RN  Safety Measures: safety rounds completed     Problem: Depression  Goal: Improved Mood  Outcome: Progressing  Intervention: Monitor and Manage Depressive Symptoms  Recent Flowsheet Documentation  Taken 1/1/2025 1100 by Rocío Curry, RN  Family/Support System Care:   involvement promoted   self-care encouraged   presence promoted   support provided     Problem: Suicidal Behavior  Goal: Suicidal Behavior is Absent or Managed  Outcome: Progressing  Intervention: Provide Immediate and Ongoing Protective Physical Environment  Recent Flowsheet Documentation  Taken 1/1/2025 1100 by Rocío Curry, RN  Safe Transition Promotion: protective factors promoted   Goal Outcome Evaluation:    Plan of Care Reviewed With: patient      Pt is visible in the milieu sitting by the lounge area. Pt is talking and engaging with peers. Interactions are appropriate. Pt is eating and drinking adequately . He appears neat and clean. Pt goes to his room to use the bathroom, then he goes back out to the lounge area. At about lunch, Pt was a little irritable with the movies playing because the genre had been the same for a " couple of days now. He was redirectable. No behavioral concerns this shift.

## 2025-01-02 VITALS
TEMPERATURE: 97.7 F | OXYGEN SATURATION: 95 % | HEART RATE: 61 BPM | SYSTOLIC BLOOD PRESSURE: 135 MMHG | DIASTOLIC BLOOD PRESSURE: 84 MMHG | WEIGHT: 249.1 LBS | BODY MASS INDEX: 40.21 KG/M2 | RESPIRATION RATE: 18 BRPM

## 2025-01-02 PROCEDURE — 250N000013 HC RX MED GY IP 250 OP 250 PS 637

## 2025-01-02 PROCEDURE — 99231 SBSQ HOSP IP/OBS SF/LOW 25: CPT | Mod: GC | Performed by: PSYCHIATRY & NEUROLOGY

## 2025-01-02 PROCEDURE — 124N000002 HC R&B MH UMMC

## 2025-01-02 RX ADMIN — OLANZAPINE 5 MG: 5 TABLET, ORALLY DISINTEGRATING ORAL at 21:06

## 2025-01-02 RX ADMIN — CINACALCET 30 MG: 30 TABLET ORAL at 08:26

## 2025-01-02 RX ADMIN — AMLODIPINE BESYLATE 10 MG: 5 TABLET ORAL at 08:26

## 2025-01-02 RX ADMIN — ATORVASTATIN CALCIUM 40 MG: 20 TABLET, FILM COATED ORAL at 08:26

## 2025-01-02 RX ADMIN — Medication 1 PATCH: at 08:32

## 2025-01-02 RX ADMIN — MELATONIN TAB 3 MG 3 MG: 3 TAB at 21:05

## 2025-01-02 RX ADMIN — CLONIDINE HYDROCHLORIDE 0.1 MG: 0.1 TABLET ORAL at 21:05

## 2025-01-02 RX ADMIN — Medication 25 MG: at 21:05

## 2025-01-02 RX ADMIN — METOPROLOL SUCCINATE 50 MG: 50 TABLET, EXTENDED RELEASE ORAL at 08:29

## 2025-01-02 RX ADMIN — FUROSEMIDE 40 MG: 40 TABLET ORAL at 08:25

## 2025-01-02 RX ADMIN — LISINOPRIL 40 MG: 10 TABLET ORAL at 09:09

## 2025-01-02 RX ADMIN — CLONIDINE HYDROCHLORIDE 0.1 MG: 0.1 TABLET ORAL at 08:29

## 2025-01-02 ASSESSMENT — ACTIVITIES OF DAILY LIVING (ADL)
ADLS_ACUITY_SCORE: 66
LAUNDRY: WITH SUPERVISION
ADLS_ACUITY_SCORE: 66
HYGIENE/GROOMING: INDEPENDENT
DRESS: INDEPENDENT
ADLS_ACUITY_SCORE: 66
ADLS_ACUITY_SCORE: 66
DRESS: INDEPENDENT
ADLS_ACUITY_SCORE: 66
ORAL_HYGIENE: INDEPENDENT
ADLS_ACUITY_SCORE: 66
ORAL_HYGIENE: INDEPENDENT
HYGIENE/GROOMING: INDEPENDENT
LAUNDRY: WITH SUPERVISION
ADLS_ACUITY_SCORE: 66

## 2025-01-02 NOTE — PROGRESS NOTES
"  ----------------------------------------------------------------------------------------------------------  [facility]  Psychiatry Progress Note  Hospital Day #82     Interim History:     The patient's care was discussed with the treatment team and chart notes were reviewed.    Identifier: Estevan Aaron is a 64 year old male with previous psychiatric diagnoses of  generalized anxiety disorder, Neurocognitive disorder, admitted from the ED 10/12/2024 due to concern for HI and psychosis in the context of medical issues (hyperthyroidism, hypercalcemia) psychosocial stressors including family dynamics with recent possible divorce.    Sleep: 3.5 hours (01/02/25 0600)  Scheduled medications: Took all scheduled medications as prescribed  Psychiatric PRN medications:   Last 24H PRN:     acetaminophen (TYLENOL) tablet 650 mg, 650 mg at 01/01/25 2152None     Staff Report:   Santos continues to present with a lack of insight into his current situation, showing limited understanding of his treatment plan and hospitalization. He participated in group activities and interacted appropriately with peers during the session, demonstrating social engagement. His mood appeared bright, and his affect was consistent with his reported mood, indicating some emotional stability. He denies any concerns regarding his hospitalization or treatment at this time.    See staff notes for more information     Subjective:     Patient Interview:  Santos was meet in the milieu. He mentions feeling \"I'm back\", when explaining further, he reports that he was talking with another patient and we interrupted and now they are gone. He mentions to have a rough sleep due to the same concerns about pain. He ate fine and have no other concerns at this moment.    ROS:  See above     Objective:     Vitals:  BP (!) 147/70 (BP Location: Right arm)   Pulse 64   Temp 97.7  F (36.5  C) (Temporal)   Resp 18   Wt 110.6 kg " (243 lb 12.8 oz)   SpO2 94%   BMI 39.35 kg/m      Allergies:  No Known Allergies    Current Medications:  Scheduled:  Current Facility-Administered Medications   Medication Dose Route Frequency Provider Last Rate Last Admin    acetaminophen (TYLENOL) tablet 650 mg  650 mg Oral Q4H PRN Nikki Caceres MD   650 mg at 01/01/25 2152    alum & mag hydroxide-simethicone (MAALOX) suspension 30 mL  30 mL Oral Q4H PRN Nikki Caceres MD   30 mL at 10/17/24 0837    amLODIPine (NORVASC) tablet 10 mg  10 mg Oral Daily Presley Barrera MD   10 mg at 01/01/25 0849    atorvastatin (LIPITOR) tablet 40 mg  40 mg Oral Daily Nikki Caceres MD   40 mg at 01/01/25 0849    cholecalciferol (VITAMIN D3) capsule 1,250 mcg  1,250 mcg Oral Q7 Days SirishaEveliaDO   1,250 mcg at 12/31/24 1426    cinacalcet (SENSIPAR) tablet 30 mg  30 mg Oral Daily Presley Barrera MD   30 mg at 01/01/25 0849    cloNIDine (CATAPRES) tablet 0.1 mg  0.1 mg Oral BID Presley Barrera MD   0.1 mg at 01/01/25 2146    diclofenac (VOLTAREN) 1 % topical gel 2 g  2 g Topical 4x Daily PRN Rocío Lopez MD   2 g at 12/22/24 2032    furosemide (LASIX) tablet 40 mg  40 mg Oral Daily Presley Barrera MD   40 mg at 01/01/25 0849    gabapentin (NEURONTIN) capsule 100 mg  100 mg Oral Q6H PRN Nikki Caceres MD   100 mg at 12/24/24 0046    hydrocortisone (CORTAID) 0.5 % cream   Topical Daily PRN Savi Chamorro MD        Lidocaine (LIDOCARE) 4 % Patch 1 patch  1 patch Transdermal Q24H PRN Rocío Lopez MD   1 patch at 12/22/24 2023    lisinopril (ZESTRIL) tablet 40 mg  40 mg Oral Daily Presley Barrera MD   40 mg at 01/01/25 0849    melatonin tablet 3 mg  3 mg Oral At Bedtime Presley Barrera MD   3 mg at 01/01/25 2146    metoprolol succinate ER (TOPROL XL) 24 hr tablet 50 mg  50 mg Oral Daily Presley Barrera MD   50 mg at 01/01/25 0848    nicotine (NICODERM CQ) 14 MG/24HR 24 hr patch 1 patch  1 patch Transdermal Daily  Evelia Grimm DO   1 patch at 01/01/25 0854    nicotine (NICORETTE) gum 2 mg  2 mg Buccal Q1H PRN González Garcia MD   2 mg at 10/24/24 1254    OLANZapine zydis (zyPREXA) ODT tab 5 mg  5 mg Oral At Bedtime González Garcia MD   5 mg at 01/01/25 2146    Or    OLANZapine (zyPREXA) injection 10 mg  10 mg Intramuscular At Bedtime González Garcia MD        OLANZapine (zyPREXA) tablet 5 mg  5 mg Oral TID PRN Evelia Grimm DO   5 mg at 11/24/24 0436    Or    OLANZapine (zyPREXA) injection 5 mg  5 mg Intramuscular TID PRN Evelia Grimm DO        polyethylene glycol (MIRALAX) Packet 17 g  17 g Oral Daily PRN Nikki Caceres MD        traZODone (DESYREL) half-tab 25 mg  25 mg Oral At Bedtime Rocío Lopez MD   25 mg at 01/01/25 2146    traZODone (DESYREL) half-tab 25 mg  25 mg Oral At Bedtime PRN Evelia Grimm DO   25 mg at 12/24/24 0046       PRN:  Current Facility-Administered Medications   Medication Dose Route Frequency Provider Last Rate Last Admin    acetaminophen (TYLENOL) tablet 650 mg  650 mg Oral Q4H PRN Nikki Caceres MD   650 mg at 01/01/25 2152    alum & mag hydroxide-simethicone (MAALOX) suspension 30 mL  30 mL Oral Q4H PRN Nikki Caceres MD   30 mL at 10/17/24 0837    amLODIPine (NORVASC) tablet 10 mg  10 mg Oral Daily Presley Barrera MD   10 mg at 01/01/25 0849    atorvastatin (LIPITOR) tablet 40 mg  40 mg Oral Daily Nikki Caceres MD   40 mg at 01/01/25 0849    cholecalciferol (VITAMIN D3) capsule 1,250 mcg  1,250 mcg Oral Q7 Days Evelia Grimm DO   1,250 mcg at 12/31/24 1426    cinacalcet (SENSIPAR) tablet 30 mg  30 mg Oral Daily Presley Barrera MD   30 mg at 01/01/25 0849    cloNIDine (CATAPRES) tablet 0.1 mg  0.1 mg Oral BID Presley Barrera MD   0.1 mg at 01/01/25 2146    diclofenac (VOLTAREN) 1 % topical gel 2 g  2 g Topical 4x Daily PRN Rocío Lopez MD   2 g at 12/22/24 2032    furosemide (LASIX) tablet 40 mg  40 mg Oral Daily Presley Barrera,  MD   40 mg at 01/01/25 0849    gabapentin (NEURONTIN) capsule 100 mg  100 mg Oral Q6H PRN Nikki Caceres MD   100 mg at 12/24/24 0046    hydrocortisone (CORTAID) 0.5 % cream   Topical Daily PRN Savi Chamorro MD        Lidocaine (LIDOCARE) 4 % Patch 1 patch  1 patch Transdermal Q24H PRN Rocío Lopez MD   1 patch at 12/22/24 2023    lisinopril (ZESTRIL) tablet 40 mg  40 mg Oral Daily Presley Barrera MD   40 mg at 01/01/25 0849    melatonin tablet 3 mg  3 mg Oral At Bedtime Presley Barrera MD   3 mg at 01/01/25 2146    metoprolol succinate ER (TOPROL XL) 24 hr tablet 50 mg  50 mg Oral Daily Presley Barrera MD   50 mg at 01/01/25 0848    nicotine (NICODERM CQ) 14 MG/24HR 24 hr patch 1 patch  1 patch Transdermal Daily Evelia Grimm DO   1 patch at 01/01/25 0854    nicotine (NICORETTE) gum 2 mg  2 mg Buccal Q1H PRN González Garcia MD   2 mg at 10/24/24 1254    OLANZapine zydis (zyPREXA) ODT tab 5 mg  5 mg Oral At Bedtime González Garcia MD   5 mg at 01/01/25 2146    Or    OLANZapine (zyPREXA) injection 10 mg  10 mg Intramuscular At Bedtime González Garcia MD        OLANZapine (zyPREXA) tablet 5 mg  5 mg Oral TID PRN Evelia Grimm DO   5 mg at 11/24/24 0436    Or    OLANZapine (zyPREXA) injection 5 mg  5 mg Intramuscular TID PRN Evelia Grimm DO        polyethylene glycol (MIRALAX) Packet 17 g  17 g Oral Daily PRN Nikki Caceres MD        traZODone (DESYREL) half-tab 25 mg  25 mg Oral At Bedtime Rocío Lopez MD   25 mg at 01/01/25 2146    traZODone (DESYREL) half-tab 25 mg  25 mg Oral At Bedtime PRN Evelia Grimm DO   25 mg at 12/24/24 0046     Labs and Imaging:  Data this admission:  - CBC unremarkable  - CMP unremarkable  - TSH normal  - UDS negative  - Vit D low  - Hgb A1c 5.9 (10/13/24)  - Lipids unremarkable  - Vit B12 normal  - Folate normal  - Urinalysis unremarkable  - EKG normal sinus rhythm, QTc 390 ms  - Head CT showed no acute changes  - HIV non  "reactive  - Treponema antibody non reactive  - ESR wnl   - Ceruloplasmin wnl   - BOOGIE negative  - Lyme negative      Parathyroid hormone:   10/13: 85     Calcium:  10/14: 11.4  10/16: 10.3  10/17: 10.3  10/19: 9.8  10/21: 10.6  10/23: 10.3     Albumin:  10/14: 4.3  10/16: 4.3  10/17: 4.1  10/19: 4.2  10/21: 4.5  10/23: 4.3      CXR:   Impression:   1. No definite radiographic evidence of asbestosis. If clinically  indicated, consider evaluation with high resolution chest CT.  2. Nonspecific left costophrenic blunting. Given symmetrically low  lung volumes may be related to poor inspiratory effort/timing.  3. Pulmonary vascular congestion.     MRI:   IMPRESSION:  1. No acute intracranial pathology.  2. No focal lesion or structural abnormality.   3. Symmetric frontoparietal cortical volume loss slightly more than  expected for age.     Mental Status Exam:   Oriented to: Oriented to self only  General:  Awake and Alert  Appearance:  appears stated age, Grooming is adequate, and Dressed in his own clothes  Behavior/Attitude:  Disengaged, Easy to redirect, and Easily distracted  Eye Contact: Downcast distracted  Psychomotor: No evidence of tics, dystonia, or tardive dyskinesia  no catatonia present  Speech:  appropriate volume/tone, talkative, and spontaneous , apraxia of speech  Language: Fluent in English with appropriate syntax and vocabulary.  Mood:  \"I'm back\"  Affect:  labile, blunted  Thought Process:  perseverative, tangential, and confused  Thought Content:   did not assess SI/HI/ delusion of confusion . Patient denies paranoia  Associations:  loose  Insight:  impaired   Judgment:  impaired   Impulse control: limited  Attention Span:   mildly decreased  Concentration:   distractible  Recent and Remote Memory:   remote memory intact, recent memory impaired  Fund of Knowledge: average  Muscle Strength and Tone: normal  Gait and Station: Normal     Psychiatric Assessment     Estevan Aaron is a 64 year old male with " previous psychiatric diagnoses of GEORGINA admitted from the ER on 10/12/2024 due to concern for HI and psychosis in the context of medical issues (hyperthyroidism, hypercalcemia), psychosocial stressors including with recent divorce and selling his home. This is the patient's first psychiatric hospitalization. Significant symptoms on admission included delusions of persecution with grandiose beliefs, homicidal ideations, as well as disorganized thinking and behavior. The MSE on admission was pertinent for a thought process which was perseverative, circumstantial, tangential, disorganized and tangential; with rambling and looseness of associations. Psychological contributions to presentation included lack of insight. Social factors contributing to presentation included isolation, recent divorce, selling his house, and moving from hotel to hotel. Biological contributions to presentation included a history of hyperparathyroidism with chronic hypercalcemia per charts as well as a history of methamphetamine use per collateral from ex-wife.     History was difficult to obtain due to the patient's severe disorganized thinking on interview; he was observed with persecutory delusions pertaining to the realtor and gang members, disorganized behavior as these delusions have led him to flee to different hotels to stay safe/ has called  complaining of being targeted and auditory hallucinations of voices for the past 12 months. Per collateral from ex-wife, Santos has had paranoid ideations since they first met. He has always felt like people are out to get him or trying to rip him off. She says that he has had visual hallucinations (he will point things out that the rest of the family can't see) for a long time, but the family would just go along with him to avoid making him angry. This timeline and presentation could be consistent with diagnosis of paranoid personality disorder. Things began to worsen around the beginning of the  COVID pandemic, when Santos became more isolated and the family started to notice cognitive issues such as impaired memory.     Immediately prior to this hospitalization, the ex-wife found Santos in a hotel room with a generator full of gasoline, binoculars, and hunting equipment. This time course does not suggest acute psychosis, however given the patient has never had a full psychiatric work-up, we completed a first-episode psychosis work-up. Other things that could be contributing to his presentation is hyperparathyroidism with hypercalcemia. However, patient's calcium returned to normal limits on 10/16, and patient continues to have disorganized behavior unrelated to calcium elevation, so likely this is not a significant contributing factor to patient's presentation.      Currently, Santos is at times disoriented, but easily re-directable. Presents with some difficulty of word articulation, which contributes to his frustration when interacting with psych team, as he finds it difficult to explain himself. He is able to take care of his ADLs without much assistance and he is mostly independent in the unit. On the other hand, his confused behavior tend to increase in the evenings, seemingly related to  Neurocognitive Disorder diagnosis, and this could evolved to be his new baseline. Either way, Santos does not present as a danger to himself or other so his SIO was discontinued. Santos does not present with any new episodes of physical agitation and is able to attend groups and interact with peers without major altercations. Patient currently is stable with current neuroleptic dose, patient probably wont benefit from any modification in current therapy.     Goal of treatment:  Procure a memory care placement.        Psychiatric Plan by Diagnosis   Today's changes:  - Care conference with family    # Major Neurocognitive Disorder  1. Medications:  - Olanzapine 5 mg at bedtime  - Neurology consult 11/8/24, recommend outpatient  follow-up and neuropsychiatric testing     2. Pertinent Labs/Monitoring:   - Re-eval Calcium parameters tomorrow     3. Additional Plans:  - Patient will be treated in therapeutic milieu with appropriate individual and group therapies as described  - Patient is Commitment with [facility]  - [facility] meds: Haldol, Clozaril, Risperdal, Invega, Zyprexa, Seroquel, Abilify  - 12/12/24: Psych team had phone call meeting with Memory Care facility personnel  - Pending responses primary from [facility] and [facility]  - MNChoice Assessment needs to be rescheduled (only valid for 60 days, so ideally scheduled for after pt discharges and has an opportunity to apply for MA)   - Care meeting today     # Unspecified anxiety vs Generalized Anxiety Disorder  - Monitor for symptoms.  - Fluoxetine held due to suspicion of ongoing manic symptoms     Psychiatric Hospital Course:      Estevan Aaron was admitted to [facility] on a 72 hour hold.   Medications:  PTA fluoxetine was held due to concern for worsening of zelalem   New medications started at the time of admission include Zyprexa.   Increased olanzapine 10 mg at bedtime was to 10 mg BID (10/14)   Increased olanzapine 10 mg BID to 10 mg during day and 15 mg at bedtime (10/15)  10/21: increased olanzapine pm dose from 15 to 20 mg  10/23: started melatonin 3 mg at 7 pm to help patient with circadian rhythm as he has been staying up throughout the night and sleeping a lot during the day and there is some concern for delirium   10/24: consulted anesthesia for MRI brain   10/28: MRI brain complete, decrease AM olanzapine from 10 to 5 mg  10/29: Morning olanzapine decreased to 2.5 mg, evening olanzapine decreased to 15 mg   11/1: Decreased evening olanzapine to 10 mg   11/4: Decreased evening olanzapine to 7.5 mg   11/5: Decreased evening olanzapine to 5 mg   11/11: Moved morning dose of olanzapine to the afternoon as patient appears more agitated in the  afternoons/evenings  11/21: Started memantine 5 mg daily   11/26: Discontinued olanzapine 2.5 mg during the afternoon  12/2:   Discontinued memantine 5 mg, daily  12/30: Discontinued SIO     Care conference 10/18/24 with daughter Maria C and ex-wife Clifford  - Report that patient has always been paranoid since ex-wife first met him. She describes him always feeling like he is getting ripped off.   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 They reported once incidence in which he made a bid on a job and the client paid him, but he never ended up finishing the job  - Wife and him  officially this year, and since selling the house several months ago, patient has been moving from hotel to hotel due to thinking people are out to get him   - When they were selling their house, patient threatened realtors and felt he was being ripped off   - Clifford reports that she started to be afraid to be around him alone  - When he was found in the hotel, he had a generator full of gasoline, binoculars, bullets, and hunting equipment.     10/21/24: updated daughter on Santos's treatment plan      10/23/24: updated daughter on Santos's treatment plan      10/25/24: updated daughter on Santos's treatment plan, including plan to try and get MRI brain done under sedation. She said that Santos's grandfather had dementia and his sister has a brain tumor.      10/28/24: updated daughter on Santos's MRI results. She is planning on coming into town soon.      Care conference 11/1/24 with daughters Maria C and Estefani and ex-wife  Clifford  - Discussed dementia diagnosis   - Clifford asked about plans moving forward. Explained that applying for medical assistance is the first step. Explained that application processing time can be very variable. Informed family that Santos will most likely be staying on this inpatient unit during this time.   - Clifford expressed that their family would like to be the power of /have conservatorship for Santos.   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 Reports that he has bills that he has not paid.   - Maria C is planning to move to [place], and Clifford currently lives in [place]. Family prefer that Santos would in a facility that are near these locations. Discussed with family that they can also try to request a specific location (memory care).   - Clifford reports that he had previously gotten help applying for his social security and medicare, but unsure if it had been approved.   - Informed family that there is a geriatric unit and there might be a transfer if there becomes availability on this unit.   - Family shared that he was completely different just two months ago.   - Estefani shared that his family found his medications in his old truck recently, expressed that it was likely that he was not taking his medications prior to his hospitalization.      11/6/24: updated Maria C on plan to try and transfer Santos to Geriatric unit.      11/11/24: updated Maria C on neurology consult      The risks, benefits, alternatives, and side effects were discussed and understood by the patient and other caregivers.     Medical Assessment and Plan     Medical diagnoses to be addressed this admission:    # Hip Pain  - New right hip pain, and mild pain in multiple joints. Moderate response to topical antiinflammatory gel and lidocaine patch.   - Medicine consulted for recommendations 12/20    # CHF  # Hypertension  - Continue PTA medications  Furosemide 40 mg  daily  Lisinopril 40 mg daily  Metoprolol 50 mg daily  Amlodipine 10 mg daily  Clonidine 0.1 mg BID  - Pitting edema in lower extremities, not painful 3+: Medicine consulted for recommendations 12/20    # Hyperlipidemia  - Continue PTA Atorvastatin 40 mg     # Primary Hyperparathyroidism  # Hypercalcemia, hypophosphoremia   Increased Ca level to 10.9 and decreased Ph level to 2.3 in the ED. Suspicion patient's hypercalcemia could be contributing to symptoms of psychosis.  - Consulted endocrinology, who started cinacalcet 30 mg BID on 10/13  - 10/16 endocrinology recommended continuing to trend calcium and albumin to make sure patient does not become hypocalcemic and recommended holding cinacalcet   - 10/17, calcium and albumin wnl, endo recommended cinacalcet 30 mg once daily   - 10/21, per endo continue cincaclcet and recheck calcium and albumin on October 24  - 10/23: per endo, patient needs to follow up outpatient for further management of hyperparathyroidism      Medical course: Patient was physically examined by the ED prior to being transferred to the unit and was found to be medically stable and appropriate for admission.      Consults: Psychiatry, Endocrinology (follow-up of hyperthyroidism / hypercalcemia and hypertension), neurology     Checklist     Legal Status: Committed   MI Commitment with [facility]  File Number: [id number]  Start and expiration date of commitment: 10/24/24 - 04/24/25     [facility] meds: Haldol, Clozaril, Risperdal, Invega, Zyprexa, Seroquel, Abilify     PPS/CM:  Shelby Chowdary: [phone]  [email or web address].    Guardian/Conservator:  Clifford Aaron is currently emergency until 01/20/2025.       Safety Assessment:   Behavioral Orders   Procedures    Code 1 - Restrict to Unit    Routine Programming     As clinically indicated    Status 15     Every 15 minutes.       Risk Assessment:  Risk for harm is low  Risk factors: impulsive and past  behaviors  Protective factors: family      SIO: No    Disposition: Pending stabilization, medication optimization, & development of a safe discharge plan.     Attestations     This patient was seen and discussed with my attending physician.  Rocío Orellana MD  [facility] Psychiatry Resident  01/02/2025

## 2025-01-02 NOTE — PLAN OF CARE
Problem: Sleep Disturbance  Goal: Adequate Sleep/Rest  Outcome: Progressing   Goal Outcome Evaluation:    Patient appears to have slept a total of 3.5 hours.     Approached nursing desk twice asking for a bottle of water. Became upset with staff when there was no more left. Patient does not seem to understand that bottled water is not stored on the unit despite it being explained multiple times.     Safety/environment checks conducted every 15 minutes with no further concerns noted. No complaints of pain/discomfort.

## 2025-01-02 NOTE — PLAN OF CARE
BEH IP Unit Acuity Rating Score (UARS)  Patient is given one point for every criteria they meet.    CRITERIA SCORING   On a 72 hour hold, court hold, committed, stay of commitment, or revocation. 1    Patient LOS on BEH unit exceeds 20 days. 1  LOS: 82   Patient under guardianship, 55+, otherwise medically complex, or under age 11. 1   Suicide ideation without relief of precipitating factors. 0   Current plan for suicide. 0   Current plan for homicide. 0   Imminent risk or actual attempt to seriously harm another without relief of factors precipitating the attempt. 1   Severe dysfunction in daily living (ex: complete neglect for self care, extreme disruption in vegetative function, extreme deterioration in social interactions). 1   Recent (last 7 days) or current physical aggression in the ED or on unit. 0   Restraints or seclusion episode in past 72 hours. 0   Recent (last 7 days) or current verbal aggression, agitation, yelling, etc., while in the ED or unit. 0   Active psychosis. 1   Need for constant or near constant redirection (from leaving, from others, etc).  0   Intrusive or disruptive behaviors. 1   Patient requires 3 or more hours of individualized nursing care per 8-hour shift (i.e. for ADLs, meds, therapeutic interventions). 1   TOTAL 8

## 2025-01-02 NOTE — PLAN OF CARE
The patient was observed to be out and visible in the common areas of the facility, appearing appropriate and engaged. Despite this, he continues to present with a lack of insight into his current situation, showing limited understanding of his treatment plan and hospitalization. He participated in group activities and interacted appropriately with peers during the session, demonstrating social engagement. His mood appeared bright, and his affect was consistent with his reported mood, indicating some emotional stability. He denies any concerns regarding his hospitalization or treatment at this time. Intake and hygiene were adequate, and he appeared to be following his personal care routines without issues. His vital signs were stable, and there were no acute physical concerns. He continues to be compliant with his medication regimen, with no reports of side effects or issues related to adherence.  Problem: Adult Behavioral Health Plan of Care  Goal: Optimized Coping Skills in Response to Life Stressors  Outcome: Progressing  Flowsheets (Taken 1/1/2025 1931)  Optimized Coping Skills in Response to Life Stressors: making progress toward outcome   Goal Outcome Evaluation:    Plan of Care Reviewed With: patient

## 2025-01-02 NOTE — PLAN OF CARE
Team Note Due:  Monday    Assessment/Intervention/Current Symtoms and Care Coordination:  Chart review and met with team, discussed pt progress, symptomology, and response to treatment.  Discussed the discharge plan and any potential impediments to discharge. Clifford Aaron is currently emergency guardian/conservator until 01/20/2025.    Care conference held with Clifford, Maria C, and Patel. Clifford is working with another  on extending emergency guardianship/conservatorship. Family is agreeable to expanding location preference and is okay with writer calling facilities to initially explore feasibility of a referral. Discussed overall patient progress and MD addressed concern about pt's legs/edema. MD okayed family will bring in pt's iPad so they can FaceTime in the future when visiting in-person isn't an option.     Contacted [facility] regarding the possibility of Medicare covering IP days starting Jan 1 now that pt's BX GA is inactive. Will likely receive an update on this tomorrow or early next week.    Discharge Plan or Goal:  Memory care facility     Barriers to Discharge:  Patient requires further psychiatric stabilization due to current symptomology, medication management with changes subject to provider, coordination with outside supports, and aftercare planning. Pt is under civil commitment.     Referral Status:    Memory Care facilities currently in process:  [facility]. Tour completed 11/27.  [facility] [place]. Tour completed 11/29.  [facility]. Tour completed 11/30.    Memory Care facilities pending family review:  [facility].   [facility].  [facility].  [facility].  [facility].  [facility].    Memory Care facilities declined:  [facility] (private pay only, no EW).  [facility] [address] (private pay only, no EW).  [facility] (no openings).  [facility]. Tour completed 11/29. Records sent 12/2/24. Declined 12/19/24 (don't feel they are an appropriate facility for pt's needs).  Lorraine (): [email or web address]; [phone]  Isatu (director of nursing): [email or web address]  [facility].  Denied 12/26 (concerned about dementia with psychosis, history of hallucinations, high elopement risk, still on 1:1).  Nikki Noel: [email or web address]; Office [phone], Fax [phone]     Legal Status:  MI Commitment with [facility]: [place]  File Number: [id number]  Start and expiration date of commitment: 10/24/24 - 04/24/25    [facility] meds: Haldol, Clozaril, Risperdal, Invega, Zyprexa, Seroquel, Abilify    PPS/CM:  Shelby Chowdary: [phone]  [email or web address].    Contacts:  Maria C Aaron (Daughter): [phone]   Clifford Aaron (ex-wife): [phone]     No Del Angel (guardianship/conservatorship ): [phone]  [email or web address]     Upcoming Meetings and Dates/Important Information and next steps:  Follow up with family regarding list of Memory Care facilities  Discharge planning when appropriate  Pt does not qualify for MA per financial counselor on 11/8/2024. Pt will likely privately pay for Memory Care until he qualifies for MA/waivers in the future.  MNChoice Assessment needs to be rescheduled (only valid for 60 days, so ideally scheduled for after pt discharges and has an opportunity to apply for MA)  Contact Derrell Duff to do this ([phone]; [email or web address])    Provisional Discharge and Change of Status needed at discharge

## 2025-01-02 NOTE — PLAN OF CARE
"Goal Outcome Evaluation:    Plan of Care Reviewed With: patient        Santos was up ad diya, slept 3.5 hrs on NOC shift and has dozed off in the lounge throughout the shift while seated upright. Writer discussed with staff who worked on NOC shift and they indicated Pt snores loudly when laying in bed, Santos is not snoring while seated upright.    Santos denies having suicidal ideation or self harm thoughts. Santos was med adherent, insisted that \"I take 13 pills in the morning every day\" though was unable to remember his medications and also was unable to remember that this writer had met with and spoken with him last week. Santos interacted minimally with peers.Santos did not attend groups. Pt's food and fluid intake were WNL.            "

## 2025-01-03 PROCEDURE — 250N000013 HC RX MED GY IP 250 OP 250 PS 637

## 2025-01-03 PROCEDURE — 124N000002 HC R&B MH UMMC

## 2025-01-03 PROCEDURE — 99231 SBSQ HOSP IP/OBS SF/LOW 25: CPT | Mod: GC | Performed by: PSYCHIATRY & NEUROLOGY

## 2025-01-03 RX ADMIN — METOPROLOL SUCCINATE 50 MG: 50 TABLET, EXTENDED RELEASE ORAL at 08:04

## 2025-01-03 RX ADMIN — AMLODIPINE BESYLATE 10 MG: 5 TABLET ORAL at 08:04

## 2025-01-03 RX ADMIN — Medication 1 PATCH: at 08:03

## 2025-01-03 RX ADMIN — CINACALCET 30 MG: 30 TABLET ORAL at 08:04

## 2025-01-03 RX ADMIN — ATORVASTATIN CALCIUM 40 MG: 20 TABLET, FILM COATED ORAL at 08:04

## 2025-01-03 RX ADMIN — MELATONIN TAB 3 MG 3 MG: 3 TAB at 21:07

## 2025-01-03 RX ADMIN — OLANZAPINE 5 MG: 5 TABLET, ORALLY DISINTEGRATING ORAL at 21:08

## 2025-01-03 RX ADMIN — CLONIDINE HYDROCHLORIDE 0.1 MG: 0.1 TABLET ORAL at 21:07

## 2025-01-03 RX ADMIN — CLONIDINE HYDROCHLORIDE 0.1 MG: 0.1 TABLET ORAL at 08:04

## 2025-01-03 RX ADMIN — LISINOPRIL 40 MG: 10 TABLET ORAL at 08:04

## 2025-01-03 RX ADMIN — FUROSEMIDE 40 MG: 40 TABLET ORAL at 08:04

## 2025-01-03 RX ADMIN — Medication 25 MG: at 21:08

## 2025-01-03 ASSESSMENT — ACTIVITIES OF DAILY LIVING (ADL)
ORAL_HYGIENE: INDEPENDENT
ADLS_ACUITY_SCORE: 66
ADLS_ACUITY_SCORE: 66
DRESS: INDEPENDENT
ADLS_ACUITY_SCORE: 66
HYGIENE/GROOMING: INDEPENDENT
ADLS_ACUITY_SCORE: 66
HYGIENE/GROOMING: INDEPENDENT
DRESS: INDEPENDENT
ADLS_ACUITY_SCORE: 66
ORAL_HYGIENE: INDEPENDENT
ADLS_ACUITY_SCORE: 66

## 2025-01-03 NOTE — PLAN OF CARE
BEH IP Unit Acuity Rating Score (UARS)  Patient is given one point for every criteria they meet.    CRITERIA SCORING   On a 72 hour hold, court hold, committed, stay of commitment, or revocation. 1    Patient LOS on BEH unit exceeds 20 days. 1  LOS: 83   Patient under guardianship, 55+, otherwise medically complex, or under age 11. 1   Suicide ideation without relief of precipitating factors. 0   Current plan for suicide. 0   Current plan for homicide. 0   Imminent risk or actual attempt to seriously harm another without relief of factors precipitating the attempt. 1   Severe dysfunction in daily living (ex: complete neglect for self care, extreme disruption in vegetative function, extreme deterioration in social interactions). 1   Recent (last 7 days) or current physical aggression in the ED or on unit. 0   Restraints or seclusion episode in past 72 hours. 0   Recent (last 7 days) or current verbal aggression, agitation, yelling, etc., while in the ED or unit. 0   Active psychosis. 1   Need for constant or near constant redirection (from leaving, from others, etc).  0   Intrusive or disruptive behaviors. 1   Patient requires 3 or more hours of individualized nursing care per 8-hour shift (i.e. for ADLs, meds, therapeutic interventions). 1   TOTAL 8

## 2025-01-03 NOTE — PLAN OF CARE
Problem: Psychotic Signs/Symptoms  Goal: Increased Participation and Engagement (Psychotic Signs/Symptoms)  Outcome: Progressing  Intervention: Facilitate Participation and Engagement  Diversional Activity: television    Pt presented as alert and oriented to self only throughout shift.  They were intermittently visible in the milieu during the day.  Pt did not attend OT groups on this shift.  They were dressed appropriately and appeared adequately hygenic.  Pt is eating and drinking adequately.  They were compliant with their scheduled medications.  Pt did not request or require any PRNs on this shift.  BP elevated, otherwise VSS and no issues with bowel or bladder.  Pt denied anxiety and depression.  They did not endorse any symptoms of psychosis.  Pt denied pain or any acute physical health concerns.  No side effects to medications noted this shift.      Goal Outcome Evaluation:    Plan of Care Reviewed With: patient

## 2025-01-03 NOTE — PLAN OF CARE
Problem: Sleep Disturbance  Goal: Adequate Sleep/Rest  Outcome: Progressing   Goal Outcome Evaluation:    Patient appears to have slept a total of 6.5 hours. Safety/environment checks conducted every 15 minutes with no concerns noted. No complaints of pain/discomfort.

## 2025-01-03 NOTE — PLAN OF CARE
Pt was mostly sitting th e lounge watching tv. Pt was observed napping in the chair in the lounge during reflection time. Pt sometimes gets confused sometimes and makes delusional statements. Denies all mental health symptoms.  Pt ate about 75% of dinner. Pt was calm this shift. Compliant with medications. No prn given this shift. Vital signs: Temp: 97.7  F (36.5  C) Temp src: Oral BP: 135/84 Pulse: 61   Resp: 18 SpO2: 95 % O2 Device: None (Room air)     Goal Outcome Evaluation:    Plan of Care Reviewed With: patient      Problem: Psychotic Signs/Symptoms  Goal: Improved Behavioral Control (Psychotic Signs/Symptoms)  Outcome: Progressing     Problem: Psychotic Signs/Symptoms  Goal: Increased Participation and Engagement (Psychotic Signs/Symptoms)  Intervention: Facilitate Participation and Engagement  Recent Flowsheet Documentation  Taken 1/2/2025 1853 by Noah Murray RN  Diversional Activity: television     Problem: Psychotic Signs/Symptoms  Goal: Improved Mood Symptoms (Psychotic Signs/Symptoms)  Intervention: Optimize Emotion and Mood  Recent Flowsheet Documentation  Taken 1/2/2025 1853 by Noah Murray RN  Diversional Activity: television  Intervention: Optimize Emotion and Mood  Recent Flowsheet Documentation  Taken 1/2/2025 1853 by Noah Murray RN  Diversional Activity: television

## 2025-01-03 NOTE — PROGRESS NOTES
----------------------------------------------------------------------------------------------------------  [facility]  Psychiatry Progress Note  Hospital Day #83     Interim History:     The patient's care was discussed with the treatment team and chart notes were reviewed.    Identifier: Estevan Aaron is a 64 year old male with previous psychiatric diagnoses of  generalized anxiety disorder, Neurocognitive disorder, admitted from the ED 10/12/2024 due to concern for HI and psychosis in the context of medical issues (hyperthyroidism, hypercalcemia) psychosocial stressors including family dynamics with recent possible divorce.    Sleep: 6.5 hours (01/03/25 0600)  Scheduled medications: Took all scheduled medications as prescribed  Psychiatric PRN medications: None     Staff Report:   Santos slept well overnight, yesterday mostly sitting in the lounge watching tv. At times napping in the chair in the lounge during reflection time. Santos sometimes gets confused and makes delusional statements but he is redirectable. He denies all mental health symptoms.     See staff notes for more information     Subjective:     Patient Interview:  Santos was met in his room. He mentions to be busy in the bathroom. He later denies any complains at the moment, is sleeping good and eating well. We talk about the pictures of his family and pets. Team informs him that if he needs anything to let the staff know.     ROS:  See above     Objective:     Vitals:  BP (!) 161/73 (BP Location: Left arm)   Pulse 58   Temp 97.4  F (36.3  C) (Temporal)   Resp 18   Wt 113 kg (249 lb 1.6 oz)   SpO2 98%   BMI 40.21 kg/m      Allergies:  No Known Allergies    Current Medications:  Scheduled:  Current Facility-Administered Medications   Medication Dose Route Frequency Provider Last Rate Last Admin    acetaminophen (TYLENOL) tablet 650 mg  650 mg Oral Q4H PRN Nikki Caceres MD   650 mg at 01/01/25 3114     alum & mag hydroxide-simethicone (MAALOX) suspension 30 mL  30 mL Oral Q4H PRN Nikki Caceres MD   30 mL at 10/17/24 0837    amLODIPine (NORVASC) tablet 10 mg  10 mg Oral Daily Presley Barrera MD   10 mg at 01/03/25 0804    atorvastatin (LIPITOR) tablet 40 mg  40 mg Oral Daily Nikki Caceres MD   40 mg at 01/03/25 0804    cholecalciferol (VITAMIN D3) capsule 1,250 mcg  1,250 mcg Oral Q7 Days Evelia Grimm DO   1,250 mcg at 12/31/24 1426    cinacalcet (SENSIPAR) tablet 30 mg  30 mg Oral Daily Presley Barrera MD   30 mg at 01/03/25 0804    cloNIDine (CATAPRES) tablet 0.1 mg  0.1 mg Oral BID Presley Barrera MD   0.1 mg at 01/03/25 0804    diclofenac (VOLTAREN) 1 % topical gel 2 g  2 g Topical 4x Daily PRN Rocío Lopez MD   2 g at 12/22/24 2032    furosemide (LASIX) tablet 40 mg  40 mg Oral Daily Presley Barrera MD   40 mg at 01/03/25 0804    gabapentin (NEURONTIN) capsule 100 mg  100 mg Oral Q6H PRN Nikki Caceres MD   100 mg at 12/24/24 0046    hydrocortisone (CORTAID) 0.5 % cream   Topical Daily PRN Savi Chamorro MD        Lidocaine (LIDOCARE) 4 % Patch 1 patch  1 patch Transdermal Q24H PRN Rocío Lopez MD   1 patch at 12/22/24 2023    lisinopril (ZESTRIL) tablet 40 mg  40 mg Oral Daily Presley Barrera MD   40 mg at 01/03/25 0804    melatonin tablet 3 mg  3 mg Oral At Bedtime Presley Barrera MD   3 mg at 01/02/25 2105    metoprolol succinate ER (TOPROL XL) 24 hr tablet 50 mg  50 mg Oral Daily Presley Barrera MD   50 mg at 01/03/25 0804    nicotine (NICODERM CQ) 14 MG/24HR 24 hr patch 1 patch  1 patch Transdermal Daily Sirisha, Evelia, DO   1 patch at 01/03/25 0803    nicotine (NICORETTE) gum 2 mg  2 mg Buccal Q1H PRN González Garcia MD   2 mg at 10/24/24 1254    OLANZapine zydis (zyPREXA) ODT tab 5 mg  5 mg Oral At Bedtime González Garcia MD   5 mg at 01/02/25 2106    Or    OLANZapine (zyPREXA) injection 10 mg  10 mg Intramuscular At Bedtime  González Garcia MD        OLANZapine (zyPREXA) tablet 5 mg  5 mg Oral TID PRN Evelia Grimm DO   5 mg at 11/24/24 0436    Or    OLANZapine (zyPREXA) injection 5 mg  5 mg Intramuscular TID PRN Evelia Grimm DO        polyethylene glycol (MIRALAX) Packet 17 g  17 g Oral Daily PRN Nikki Caceres MD        traZODone (DESYREL) half-tab 25 mg  25 mg Oral At Bedtime Rocío Lopez MD   25 mg at 01/02/25 2105    traZODone (DESYREL) half-tab 25 mg  25 mg Oral At Bedtime PRN Evelia Grimm DO   25 mg at 12/24/24 0046       PRN:  Current Facility-Administered Medications   Medication Dose Route Frequency Provider Last Rate Last Admin    acetaminophen (TYLENOL) tablet 650 mg  650 mg Oral Q4H PRN Nikki Caceres MD   650 mg at 01/01/25 2152    alum & mag hydroxide-simethicone (MAALOX) suspension 30 mL  30 mL Oral Q4H PRN Nikki Caceres MD   30 mL at 10/17/24 0837    amLODIPine (NORVASC) tablet 10 mg  10 mg Oral Daily Presley Barrera MD   10 mg at 01/03/25 0804    atorvastatin (LIPITOR) tablet 40 mg  40 mg Oral Daily Nikki Caceres MD   40 mg at 01/03/25 0804    cholecalciferol (VITAMIN D3) capsule 1,250 mcg  1,250 mcg Oral Q7 Days Evelia Grimm DO   1,250 mcg at 12/31/24 1426    cinacalcet (SENSIPAR) tablet 30 mg  30 mg Oral Daily Presley Barrera MD   30 mg at 01/03/25 0804    cloNIDine (CATAPRES) tablet 0.1 mg  0.1 mg Oral BID Presley Barrera MD   0.1 mg at 01/03/25 0804    diclofenac (VOLTAREN) 1 % topical gel 2 g  2 g Topical 4x Daily PRN Rocío Lopez MD   2 g at 12/22/24 2032    furosemide (LASIX) tablet 40 mg  40 mg Oral Daily Presley Barrera MD   40 mg at 01/03/25 0804    gabapentin (NEURONTIN) capsule 100 mg  100 mg Oral Q6H PRN Nikki Caceres MD   100 mg at 12/24/24 0046    hydrocortisone (CORTAID) 0.5 % cream   Topical Daily PRN Savi Chamorro MD        Lidocaine (LIDOCARE) 4 % Patch 1 patch  1 patch Transdermal Q24H PRN Rocío Lopez  MD Michelle   1 patch at 12/22/24 2023    lisinopril (ZESTRIL) tablet 40 mg  40 mg Oral Daily Presley Barrera MD   40 mg at 01/03/25 0804    melatonin tablet 3 mg  3 mg Oral At Bedtime Presley Barrera MD   3 mg at 01/02/25 2105    metoprolol succinate ER (TOPROL XL) 24 hr tablet 50 mg  50 mg Oral Daily Presley Barrera MD   50 mg at 01/03/25 0804    nicotine (NICODERM CQ) 14 MG/24HR 24 hr patch 1 patch  1 patch Transdermal Daily Evelia Grimm DO   1 patch at 01/03/25 0803    nicotine (NICORETTE) gum 2 mg  2 mg Buccal Q1H PRN González Garcia MD   2 mg at 10/24/24 1254    OLANZapine zydis (zyPREXA) ODT tab 5 mg  5 mg Oral At Bedtime González Garcia MD   5 mg at 01/02/25 2106    Or    OLANZapine (zyPREXA) injection 10 mg  10 mg Intramuscular At Bedtime González Garcia MD        OLANZapine (zyPREXA) tablet 5 mg  5 mg Oral TID PRN Evelia Grimm DO   5 mg at 11/24/24 0436    Or    OLANZapine (zyPREXA) injection 5 mg  5 mg Intramuscular TID PRN Evelia Grimm DO        polyethylene glycol (MIRALAX) Packet 17 g  17 g Oral Daily PRN Nikki Caceres MD        traZODone (DESYREL) half-tab 25 mg  25 mg Oral At Bedtime Rocío Lopez MD   25 mg at 01/02/25 2105    traZODone (DESYREL) half-tab 25 mg  25 mg Oral At Bedtime PRN Evelia Grimm DO   25 mg at 12/24/24 0046     Labs and Imaging:  Data this admission:  - CBC unremarkable  - CMP unremarkable  - TSH normal  - UDS negative  - Vit D low  - Hgb A1c 5.9 (10/13/24)  - Lipids unremarkable  - Vit B12 normal  - Folate normal  - Urinalysis unremarkable  - EKG normal sinus rhythm, QTc 390 ms  - Head CT showed no acute changes  - HIV non reactive  - Treponema antibody non reactive  - ESR wnl   - Ceruloplasmin wnl   - BOOGIE negative  - Lyme negative      Parathyroid hormone:   10/13: 85     Calcium:  10/14: 11.4  10/16: 10.3  10/17: 10.3  10/19: 9.8  10/21: 10.6  10/23: 10.3     Albumin:  10/14: 4.3  10/16: 4.3  10/17: 4.1  10/19: 4.2  10/21: 4.5  10/23: 4.3     "  CXR:   Impression:   1. No definite radiographic evidence of asbestosis. If clinically  indicated, consider evaluation with high resolution chest CT.  2. Nonspecific left costophrenic blunting. Given symmetrically low  lung volumes may be related to poor inspiratory effort/timing.  3. Pulmonary vascular congestion.     MRI:   IMPRESSION:  1. No acute intracranial pathology.  2. No focal lesion or structural abnormality.   3. Symmetric frontoparietal cortical volume loss slightly more than  expected for age.     Mental Status Exam:   Oriented to: Oriented to self only  General:  Awake and Alert  Appearance:  appears stated age, Grooming is adequate, and Dressed in his own clothes  Behavior/Attitude:  Disengaged, Easy to redirect, and Easily distracted  Eye Contact: Downcast distracted  Psychomotor: No evidence of tics, dystonia, or tardive dyskinesia  no catatonia present  Speech:  appropriate volume/tone, talkative, and spontaneous , apraxia of speech  Language: Fluent in English with appropriate syntax and vocabulary.  Mood:  \"I'm busy\"  Affect:  labile, blunted  Thought Process:  perseverative, tangential, and confused  Thought Content:   did not assess SI/HI/ delusion of confusion . Patient denies paranoia  Associations:  loose  Insight:  impaired   Judgment:  impaired   Impulse control: limited  Attention Span:   mildly decreased  Concentration:   distractible  Recent and Remote Memory:   remote memory intact, recent memory impaired  Fund of Knowledge: average  Muscle Strength and Tone: normal  Gait and Station: Normal     Psychiatric Assessment     Estevan Aaron is a 64 year old male with previous psychiatric diagnoses of GEORGINA admitted from the ER on 10/12/2024 due to concern for HI and psychosis in the context of medical issues (hyperthyroidism, hypercalcemia), psychosocial stressors including with recent divorce and selling his home. This is the patient's first psychiatric hospitalization. The MSE on " admission was pertinent for a thought process which was perseverative, circumstantial, tangential, disorganized and tangential; with rambling and looseness of associations. Psychological contributions to presentation included lack of insight. Social factors contributing to presentation included isolation, recent divorce, selling his house, and moving from hotel to hotel. Biological contributions to presentation included a history of hyperparathyroidism with chronic hypercalcemia per charts as well as a history of methamphetamine use per collateral from ex-wife.     Per collateral from ex-wife, Santos has had paranoid ideations since they first met. He has always felt like people are out to get him or trying to rip him off. She says that he has had visual hallucinations (he will point things out that the rest of the family can't see) for a long time, but the family would just go along with him to avoid making him angry. This timeline and presentation could be consistent with diagnosis of paranoid personality disorder. Things began to worsen around the beginning of the COVID pandemic, when Santos became more isolated and the family started to notice cognitive issues such as impaired memory. Other things that could be contributing to his presentation is hyperparathyroidism with hypercalcemia. However, patient's calcium returned to normal limits on 10/16, and patient continues to have disorganized behavior unrelated to calcium elevation, so likely this is not a significant contributing factor to patient's presentation.      Currently, Santos is at times disoriented, but easily re-directable. Presents with some difficulty of word articulation, which contributes to his frustration when interacting with psych team, as he finds it difficult to explain himself. He is able to take care of his ADLs without much assistance and he is mostly independent in the unit. On the other hand, his confused behavior tend to increase in the  evenings, seemingly related to  Neurocognitive Disorder diagnosis, and this could evolved to be his new baseline. Either way, Santos does not present as a danger to himself or other so his SIO was discontinued. Santos does not present with any new episodes of physical agitation and is able to attend groups and interact with peers without major altercations. Patient currently is stable with current neuroleptic dose, patient probably wont benefit from any modification in current therapy.     Family care meeting yesterday with psych team: update on current availability of placement options and Santos's current presentation. Family aware of Santos's level of functionality with ADLs, besides  disorientation episodes at evenings, that only requires minimal intervention for redirection.    Goal of treatment:  Procure a memory care placement.        Psychiatric Plan by Diagnosis   Today's changes:  - none    # Major Neurocognitive Disorder  1. Medications:  - Olanzapine 5 mg at bedtime  - Neurology consult 11/8/24, recommend outpatient follow-up and neuropsychiatric testing     2. Pertinent Labs/Monitoring:   - Re-eval Calcium parameters tomorrow     3. Additional Plans:  - Patient will be treated in therapeutic milieu with appropriate individual and group therapies as described  - Patient is Commitment with [facility]  - [facility] meds: Haldol, Clozaril, Risperdal, Invega, Zyprexa, Seroquel, Abilify  - 12/12/24: Psych team had phone call meeting with Memory Care facility personnel  - Pending responses primary from [facility] and [facility]  - MNChoice Assessment needs to be rescheduled (only valid for 60 days, so ideally scheduled for after pt discharges and has an opportunity to apply for MA)   - Care meeting today     # Unspecified anxiety vs Generalized Anxiety Disorder  - Monitor for symptoms.  - Fluoxetine held due to suspicion of ongoing manic symptoms     Psychiatric Hospital Course:      Estevan Aaron  was admitted to [facility] on a 72 hour hold.   Medications:  PTA fluoxetine was held due to concern for worsening of zellaem   New medications started at the time of admission include Zyprexa.   Increased olanzapine 10 mg at bedtime was to 10 mg BID (10/14)   Increased olanzapine 10 mg BID to 10 mg during day and 15 mg at bedtime (10/15)  10/21: increased olanzapine pm dose from 15 to 20 mg  10/23: started melatonin 3 mg at 7 pm to help patient with circadian rhythm as he has been staying up throughout the night and sleeping a lot during the day and there is some concern for delirium   10/24: consulted anesthesia for MRI brain   10/28: MRI brain complete, decrease AM olanzapine from 10 to 5 mg  10/29: Morning olanzapine decreased to 2.5 mg, evening olanzapine decreased to 15 mg   11/1: Decreased evening olanzapine to 10 mg   11/4: Decreased evening olanzapine to 7.5 mg   11/5: Decreased evening olanzapine to 5 mg   11/11: Moved morning dose of olanzapine to the afternoon as patient appears more agitated in the afternoons/evenings  11/21: Started memantine 5 mg daily   11/26: Discontinued olanzapine 2.5 mg during the afternoon  12/2:   Discontinued memantine 5 mg, daily  12/30: Discontinued O     Care conference 10/18/24 with daughter Maria C and ex-wife Clifford  - Report that patient has always been paranoid since ex-wife first met him. She describes him always feeling like he is getting ripped off.   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 They reported once incidence in which he made a  bid on a job and the client paid him, but he never ended up finishing the job  - Wife and him  officially this year, and since selling the house several months ago, patient has been moving from hotel to hotel due to thinking people are out to get him   - When they were selling their house, patient threatened realtors and felt he was being ripped off   - Clifford reports that she started to be afraid to be around him alone  - When he was found in the hotel, he had a generator full of gasoline, binoculars, bullets, and hunting equipment.     10/21/24: updated daughter on Santos's treatment plan      10/23/24: updated daughter on Santos's treatment plan      10/25/24: updated daughter on Santos's treatment plan, including plan to try and get MRI brain done under sedation. She said that Santos's grandfather had dementia and his sister has a brain tumor.      10/28/24: updated daughter on Santos's MRI results. She is planning on coming into town soon.      Care conference 11/1/24 with daughters Maria C and Estefani and ex-wife Clifford  - Discussed dementia diagnosis   - Clifford asked about plans moving forward. Explained that applying for medical assistance is the first step. Explained that application processing time can be very variable. Informed family that Santos will most likely be staying on this inpatient unit during this time.   - Clifford expressed that their family would like to be the power of /have conservatorship for Santos.   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 Reports that he has bills that he has not paid.   - Maira C is planning to move to [place], and Clifford currently lives in [place]. Family prefer that Santos would in a facility that are near these locations. Discussed with family that they can also try to request a specific location (memory care).   - Clifford reports that he had previously gotten help applying for his social security and  medicare, but unsure if it had been approved.   - Informed family that there is a geriatric unit and there might be a transfer if there becomes availability on this unit.   - Family shared that he was completely different just two months ago.   - Estefani shared that his family found his medications in his old truck recently, expressed that it was likely that he was not taking his medications prior to his hospitalization.      11/6/24: updated Maria C on plan to try and transfer Santos to Geriatric unit.      11/11/24: updated Maria C on neurology consult      The risks, benefits, alternatives, and side effects were discussed and understood by the patient and other caregivers.     Medical Assessment and Plan     Medical diagnoses to be addressed this admission:    # Hip Pain  - New right hip pain, and mild pain in multiple joints. Moderate response to topical antiinflammatory gel and lidocaine patch.   - Medicine consulted for recommendations 12/20    # CHF  # Hypertension  - Continue PTA medications  Furosemide 40 mg daily  Lisinopril 40 mg daily  Metoprolol 50 mg daily  Amlodipine 10 mg daily  Clonidine 0.1 mg BID  - Pitting edema in lower extremities, not painful 3+: Medicine consulted for recommendations 12/20    # Hyperlipidemia  - Continue PTA Atorvastatin 40 mg     # Primary Hyperparathyroidism  # Hypercalcemia, hypophosphoremia   Increased Ca level to 10.9 and decreased Ph level to 2.3 in the ED. Suspicion patient's hypercalcemia could be contributing to symptoms of psychosis.  - Consulted endocrinology, who started cinacalcet 30 mg BID on 10/13  - 10/16 endocrinology recommended continuing to trend calcium and albumin to make sure patient does not become hypocalcemic and recommended holding cinacalcet   - 10/17, calcium and albumin wnl, endo recommended cinacalcet 30 mg once daily   - 10/21, per endo continue cincaclcet and recheck calcium and albumin on October 24  - 10/23: per endo, patient needs to follow up  outpatient for further management of hyperparathyroidism      Medical course: Patient was physically examined by the ED prior to being transferred to the unit and was found to be medically stable and appropriate for admission.      Consults: Psychiatry, Endocrinology (follow-up of hyperthyroidism / hypercalcemia and hypertension), neurology     Checklist     Legal Status: Committed   MI Commitment with [facility]  [facility]  File Number: [id number]  Start and expiration date of commitment: 10/24/24 - 04/24/25     [facility] meds: Haldol, Clozaril, Risperdal, Invega, Zyprexa, Seroquel, Abilify     PPS/CM:  Shelby Chowdary: [phone]  [email or web address].    Guardian/Conservator:  Clifford Aaron is currently emergency until 01/20/2025.       Safety Assessment:   Behavioral Orders   Procedures    Code 1 - Restrict to Unit    Routine Programming     As clinically indicated    Status 15     Every 15 minutes.       Risk Assessment:  Risk for harm is low  Risk factors: impulsive and past behaviors  Protective factors: family      SIO: No    Disposition: Pending stabilization, medication optimization, & development of a safe discharge plan.     Attestations     This patient was seen and discussed with my attending physician.  Rocío Orellana MD  [facility] Psychiatry Resident  01/03/2025    Attestation:  This patient has been seen and evaluated by me, Pete Smith MD.  I have discussed this patient with the house staff team including the resident and/or medical student and I agree with the findings and plan in this note.    I have reviewed today's vital signs, medications, labs and imaging. Pete Smith MD , PhD.

## 2025-01-03 NOTE — PLAN OF CARE
Team Note Due:  Monday    Assessment/Intervention/Current Symtoms and Care Coordination:  Chart review and met with team, discussed pt progress, symptomology, and response to treatment.  Discussed the discharge plan and any potential impediments to discharge. Clifford Aaron is currently emergency guardian/conservator until 01/20/2025.    Discharge Plan or Goal:  Memory care facility     Barriers to Discharge:  Patient requires further psychiatric stabilization due to current symptomology, medication management with changes subject to provider, coordination with outside supports, and aftercare planning. Pt is under civil commitment.     Referral Status:    Memory Care facilities currently in process:  [facility] [place]. Tour completed 11/27.  [facility] [place] [facility] [place]. Tour completed 11/29.  [facility]. Tour completed 11/30.    Memory Care facilities pending family review:  [facility] [place].   [facility].  [facility].  [facility].  [facility].  [facility].    Memory Care facilities declined:  [facility] (private pay only, no EW).  [facility] (private pay only, no EW).  [facility] (no openings).  [facility]. Tour completed 11/29. Records sent 12/2/24. Declined 12/19/24 (don't feel they are an appropriate facility for pt's needs).  Lorraine (): [email or web address]; [phone]  Isatu (director of nursing): [email or web address]  [facility] [place].  Denied 12/26 (concerned about dementia with psychosis, history of hallucinations, high elopement risk, still on 1:1).  Nikki Noel: [email or web address]; Office [phone], Fax [phone]     Legal Status:  MI Commitment with [facility]  File Number: [id number]  Start  and expiration date of commitment: 10/24/24 - 04/24/25    [place] meds: Haldol, Clozaril, Risperdal, Invega, Zyprexa, Seroquel, Abilify    PPS/CM:  Shelby Chowdary: [phone]  [email or web address].    Contacts:  Maria C Aaron (Daughter): [phone]   Clifford Aaron (ex-wife): [phone]     No Del Angel (guardianship/conservatorship ): [phone]  [email or web address]     Upcoming Meetings and Dates/Important Information and next steps:  Follow up with family regarding list of Memory Care facilities  Discharge planning when appropriate  Pt does not qualify for MA per financial counselor on 11/8/2024. Pt will likely privately pay for Memory Care until he qualifies for MA/waivers in the future.  MNChoice Assessment needs to be rescheduled (only valid for 60 days, so ideally scheduled for after pt discharges and has an opportunity to apply for MA)  Contact Derrell Duff to do this ([phone]; [email or web address].)    Provisional Discharge and Change of Status needed at discharge

## 2025-01-04 PROCEDURE — 250N000013 HC RX MED GY IP 250 OP 250 PS 637

## 2025-01-04 PROCEDURE — 124N000002 HC R&B MH UMMC

## 2025-01-04 RX ADMIN — ACETAMINOPHEN 650 MG: 325 TABLET, FILM COATED ORAL at 00:18

## 2025-01-04 RX ADMIN — CLONIDINE HYDROCHLORIDE 0.1 MG: 0.1 TABLET ORAL at 20:55

## 2025-01-04 RX ADMIN — MELATONIN TAB 3 MG 3 MG: 3 TAB at 20:55

## 2025-01-04 RX ADMIN — FUROSEMIDE 40 MG: 40 TABLET ORAL at 08:49

## 2025-01-04 RX ADMIN — OLANZAPINE 5 MG: 5 TABLET, ORALLY DISINTEGRATING ORAL at 20:55

## 2025-01-04 RX ADMIN — CINACALCET 30 MG: 30 TABLET ORAL at 08:49

## 2025-01-04 RX ADMIN — ATORVASTATIN CALCIUM 40 MG: 20 TABLET, FILM COATED ORAL at 08:49

## 2025-01-04 RX ADMIN — Medication 1 PATCH: at 08:48

## 2025-01-04 RX ADMIN — CLONIDINE HYDROCHLORIDE 0.1 MG: 0.1 TABLET ORAL at 08:48

## 2025-01-04 RX ADMIN — AMLODIPINE BESYLATE 10 MG: 5 TABLET ORAL at 08:49

## 2025-01-04 RX ADMIN — METOPROLOL SUCCINATE 50 MG: 50 TABLET, EXTENDED RELEASE ORAL at 08:49

## 2025-01-04 RX ADMIN — Medication 25 MG: at 20:55

## 2025-01-04 RX ADMIN — LISINOPRIL 40 MG: 10 TABLET ORAL at 08:49

## 2025-01-04 ASSESSMENT — ACTIVITIES OF DAILY LIVING (ADL)
ADLS_ACUITY_SCORE: 66
HYGIENE/GROOMING: INDEPENDENT
ADLS_ACUITY_SCORE: 66
ADLS_ACUITY_SCORE: 66
HYGIENE/GROOMING: INDEPENDENT
ADLS_ACUITY_SCORE: 66

## 2025-01-04 NOTE — PLAN OF CARE
Goal Outcome Evaluation:    Plan of Care Reviewed With: patient    Patient was visible in the milieu. Patient watched TV with peers. Patient presented with bright affect, and calm mood. When approached for assessment, patient presented with disorganized thoughts. Patient made delusional statements occasionally while talking to the peers and staff. Patient denies pain. Patient was unable to engage in mental health assessment due to disorganized thoughts. Patient visited with his family. Patient used unit  I pad to face time his family with staff supervision. Patient's blood pressure at 1600 was 128/74, pulse 60, and 132/70, pulse 64 at hs. Patient was med compliant.

## 2025-01-04 NOTE — PLAN OF CARE
Goal Outcome Evaluation:    Plan of Care Reviewed With: patient     Patient was sleeping upon arrival to the unit. He woke up with a flat and blunted affect . His mood was calm. Patient denied pain. He denied all psych mental health symptoms and contracted for safety. He was medication complaint. His hygiene was appropriate. He was eating and drinking adequately. Patient was seen in the [facility] area, socializing with select peers and watching TV. Patient's daughters came to visit and it went well. There was no behavioral or safety concerns noted this shift.

## 2025-01-04 NOTE — PLAN OF CARE
Problem: Sleep Disturbance  Goal: Adequate Sleep/Rest  Outcome: Progressing     Problem: Pain Acute  Goal: Optimal Pain Control and Function  Intervention: Develop Pain Management Plan  Recent Flowsheet Documentation  Taken 1/4/2025 0018 by Sirena Man, RN  Pain Management Interventions: medication (see MAR)   Goal Outcome Evaluation:    Patient appears to have slept a total of 6.75 hours.     Given PRN Tylenol and hot packs for hip pain. Redirected to sleep in his bed and elevate his legs. Sleeping upon reassessment.     Safety/environment checks conducted every 15 minutes with no further concerns noted.

## 2025-01-05 PROCEDURE — 250N000013 HC RX MED GY IP 250 OP 250 PS 637

## 2025-01-05 PROCEDURE — 124N000002 HC R&B MH UMMC

## 2025-01-05 RX ADMIN — LISINOPRIL 40 MG: 10 TABLET ORAL at 07:52

## 2025-01-05 RX ADMIN — ACETAMINOPHEN 650 MG: 325 TABLET, FILM COATED ORAL at 04:32

## 2025-01-05 RX ADMIN — CINACALCET 30 MG: 30 TABLET ORAL at 07:51

## 2025-01-05 RX ADMIN — FUROSEMIDE 40 MG: 40 TABLET ORAL at 07:51

## 2025-01-05 RX ADMIN — OLANZAPINE 5 MG: 5 TABLET, ORALLY DISINTEGRATING ORAL at 20:36

## 2025-01-05 RX ADMIN — CLONIDINE HYDROCHLORIDE 0.1 MG: 0.1 TABLET ORAL at 20:36

## 2025-01-05 RX ADMIN — METOPROLOL SUCCINATE 50 MG: 50 TABLET, EXTENDED RELEASE ORAL at 07:51

## 2025-01-05 RX ADMIN — MELATONIN TAB 3 MG 3 MG: 3 TAB at 20:35

## 2025-01-05 RX ADMIN — Medication 25 MG: at 20:35

## 2025-01-05 RX ADMIN — AMLODIPINE BESYLATE 10 MG: 5 TABLET ORAL at 07:51

## 2025-01-05 RX ADMIN — OLANZAPINE 5 MG: 5 TABLET, FILM COATED ORAL at 16:45

## 2025-01-05 RX ADMIN — CLONIDINE HYDROCHLORIDE 0.1 MG: 0.1 TABLET ORAL at 07:51

## 2025-01-05 RX ADMIN — Medication 1 PATCH: at 07:55

## 2025-01-05 RX ADMIN — ATORVASTATIN CALCIUM 40 MG: 20 TABLET, FILM COATED ORAL at 07:52

## 2025-01-05 ASSESSMENT — ACTIVITIES OF DAILY LIVING (ADL)
ADLS_ACUITY_SCORE: 66
DRESS: INDEPENDENT
ADLS_ACUITY_SCORE: 66
LAUNDRY: WITH SUPERVISION
HYGIENE/GROOMING: INDEPENDENT
ADLS_ACUITY_SCORE: 66
LAUNDRY: WITH SUPERVISION
ADLS_ACUITY_SCORE: 66
ORAL_HYGIENE: INDEPENDENT
ADLS_ACUITY_SCORE: 66
ORAL_HYGIENE: INDEPENDENT
ADLS_ACUITY_SCORE: 66
HYGIENE/GROOMING: INDEPENDENT
ADLS_ACUITY_SCORE: 66
DRESS: INDEPENDENT

## 2025-01-05 NOTE — PLAN OF CARE
Goal Outcome Evaluation:    Plan of Care Reviewed With: patient        Patient endorses high anxiety and agitation this shift,  he is very loud on the unit and hard to redirect. Patient was upset due to the lounge area being shut down because of verbal aggression between two other peers. Staff attempted verbal de-escalation multiple times but that was unsuccessful. He continues to use derogatory words on the unit, cursing out staff very loud. DARREN Team was called but verbal de-escalation was still unsuccessful. He presents with a very a disorganized thought process with some confusion requiring constant redirection. He appears to be some how confused but he is very alert to self and place. Writer offered patient PRN Zyprexa after multiple attempts to verbally de-escalate patient was unsuccessful. Patient's verbal aggressive behavior towards staff continued, demanding the TV to be turned on even after the lounge was shut down. Patient's care plan was reviewed and an acuity staff was requested for this shift for safety. Order for acuity staff obtained. Patient agreed to take the medication. He denies having any pain at this time, refuses to answer assessment questions. Patient was reluctant with taking his scheduled meds, with a lot of encouragement, he later agreed to take all his scheduled medication.

## 2025-01-05 NOTE — PLAN OF CARE
Problem: Sleep Disturbance  Goal: Adequate Sleep/Rest  Outcome: Progressing   Goal Outcome Evaluation:    Patient appears to have slept a total of 5 hours.     Given PRN Tylenol and hot packs for right hip pain. Unable to give coherent answer on reassessment but no signs of pain observed.     Safety/environment checks conducted every 15 minutes with no further concerns noted.

## 2025-01-05 NOTE — PLAN OF CARE
"  Problem: Adult Behavioral Health Plan of Care  Goal: Plan of Care Review  Outcome: Progressing  Flowsheets  Taken 1/5/2025 1052  Plan of Care Reviewed With: patient  Taken 1/5/2025 1000  Patient Agreement with Plan of Care: agrees  Goal: Patient-Specific Goal (Individualization)  Description: You can add care plan individualizations to a care plan. Examples of Individualization might be:  \"Parent requests to be called daily at 9am for status\", \"I have a hard time hearing out of my right ear\", or \"Do not touch me to wake me up as it startles  me\".  Outcome: Progressing  Goal: Adheres to Safety Considerations for Self and Others  Outcome: Progressing  Intervention: Develop and Maintain Individualized Safety Plan  Recent Flowsheet Documentation  Taken 1/5/2025 1000 by Vera Graham RN  Safety Measures: environmental rounds completed  Intervention: Develop and Maintain Individualized Safety Plan  Recent Flowsheet Documentation  Taken 1/5/2025 1000 by Vera Graham RN  Safety Measures: environmental rounds completed  Goal: Absence of New-Onset Illness or Injury  Outcome: Progressing  Intervention: Identify and Manage Fall Risk  Recent Flowsheet Documentation  Taken 1/5/2025 1000 by Vera Graham RN  Safety Measures: environmental rounds completed  Intervention: Prevent VTE (Venous Thromboembolism)  Recent Flowsheet Documentation  Taken 1/5/2025 1000 by Vera Graham RN  VTE Prevention/Management: SCDs off (sequential compression devices)  Intervention: Prevent VTE (Venous Thromboembolism)  Recent Flowsheet Documentation  Taken 1/5/2025 1000 by Vera Graham RN  VTE Prevention/Management: SCDs off (sequential compression devices)  Goal: Optimized Coping Skills in Response to Life Stressors  Outcome: Progressing  Intervention: Promote Effective Coping Strategies  Recent Flowsheet Documentation  Taken 1/5/2025 1000 by Vera Graham RN  Supportive Measures: active listening " "utilized  Intervention: Promote Effective Coping Strategies  Recent Flowsheet Documentation  Taken 1/5/2025 1000 by Vera Graham RN  Supportive Measures: active listening utilized  Goal: Develops/Participates in Therapeutic [place] to Support Successful Transition  Outcome: Progressing  Intervention: Foster Therapeutic [place]  Recent Flowsheet Documentation  Taken 1/5/2025 1000 by Vera Graham RN  Trust Relationship/Rapport:   care explained   choices provided  Intervention: Foster Therapeutic [place]  Recent Flowsheet Documentation  Taken 1/5/2025 1000 by Vera Graham RN  Trust Relationship/Rapport:   care explained   choices provided     Problem: Adult Behavioral Health Plan of Care  Goal: Plan of Care Review  Outcome: Progressing  Flowsheets  Taken 1/5/2025 1052  Plan of Care Reviewed With: patient  Taken 1/5/2025 1000  Patient Agreement with Plan of Care: agrees  Goal: Patient-Specific Goal (Individualization)  Description: You can add care plan individualizations to a care plan. Examples of Individualization might be:  \"Parent requests to be called daily at 9am for status\", \"I have a hard time hearing out of my right ear\", or \"Do not touch me to wake me up as it startles  me\".  Outcome: Progressing  Goal: Adheres to Safety Considerations for Self and Others  Outcome: Progressing  Intervention: Develop and Maintain Individualized Safety Plan  Recent Flowsheet Documentation  Taken 1/5/2025 1000 by Vera Graham RN  Safety Measures: environmental rounds completed  Intervention: Develop and Maintain Individualized Safety Plan  Recent Flowsheet Documentation  Taken 1/5/2025 1000 by Vera Graham RN  Safety Measures: environmental rounds completed  Goal: Absence of New-Onset Illness or Injury  Outcome: Progressing  Intervention: Identify and Manage Fall Risk  Recent Flowsheet Documentation  Taken 1/5/2025 1000 by Vera Graham RN  Safety Measures: environmental rounds " "completed  Intervention: Prevent VTE (Venous Thromboembolism)  Recent Flowsheet Documentation  Taken 1/5/2025 1000 by Vera Graham RN  VTE Prevention/Management: SCDs off (sequential compression devices)  Intervention: Prevent VTE (Venous Thromboembolism)  Recent Flowsheet Documentation  Taken 1/5/2025 1000 by Vera Graham RN  VTE Prevention/Management: SCDs off (sequential compression devices)  Goal: Optimized Coping Skills in Response to Life Stressors  Outcome: Progressing  Intervention: Promote Effective Coping Strategies  Recent Flowsheet Documentation  Taken 1/5/2025 1000 by Vera Graham RN  Supportive Measures: active listening utilized  Intervention: Promote Effective Coping Strategies  Recent Flowsheet Documentation  Taken 1/5/2025 1000 by Vera Graham RN  Supportive Measures: active listening utilized  Goal: Develops/Participates in Therapeutic [place] to Support Successful Transition  Outcome: Progressing  Intervention: Foster Therapeutic [place]  Recent Flowsheet Documentation  Taken 1/5/2025 1000 by Vera Graham RN  Trust Relationship/Rapport:   care explained   choices provided  Intervention: Foster Therapeutic [place]  Recent Flowsheet Documentation  Taken 1/5/2025 1000 by Vera Graham RN  Trust Relationship/Rapport:   care explained   choices provided     Problem: Adult Behavioral Health Plan of Care  Goal: Plan of Care Review  Outcome: Progressing  Flowsheets  Taken 1/5/2025 1052  Plan of Care Reviewed With: patient  Taken 1/5/2025 1000  Patient Agreement with Plan of Care: agrees  Goal: Patient-Specific Goal (Individualization)  Description: You can add care plan individualizations to a care plan. Examples of Individualization might be:  \"Parent requests to be called daily at 9am for status\", \"I have a hard time hearing out of my right ear\", or \"Do not touch me to wake me up as it startles  me\".  Outcome: Progressing  Goal: Adheres to Safety " Considerations for Self and Others  Outcome: Progressing  Intervention: Develop and Maintain Individualized Safety Plan  Recent Flowsheet Documentation  Taken 1/5/2025 1000 by Vera Graham RN  Safety Measures: environmental rounds completed  Intervention: Develop and Maintain Individualized Safety Plan  Recent Flowsheet Documentation  Taken 1/5/2025 1000 by Vera Graham RN  Safety Measures: environmental rounds completed  Goal: Absence of New-Onset Illness or Injury  Outcome: Progressing  Intervention: Identify and Manage Fall Risk  Recent Flowsheet Documentation  Taken 1/5/2025 1000 by Vera Graham RN  Safety Measures: environmental rounds completed  Intervention: Prevent VTE (Venous Thromboembolism)  Recent Flowsheet Documentation  Taken 1/5/2025 1000 by Vera Graham RN  VTE Prevention/Management: SCDs off (sequential compression devices)  Intervention: Prevent VTE (Venous Thromboembolism)  Recent Flowsheet Documentation  Taken 1/5/2025 1000 by Vera Graham RN  VTE Prevention/Management: SCDs off (sequential compression devices)  Goal: Optimized Coping Skills in Response to Life Stressors  Outcome: Progressing  Intervention: Promote Effective Coping Strategies  Recent Flowsheet Documentation  Taken 1/5/2025 1000 by Vera Graham RN  Supportive Measures: active listening utilized  Intervention: Promote Effective Coping Strategies  Recent Flowsheet Documentation  Taken 1/5/2025 1000 by Vera Graham RN  Supportive Measures: active listening utilized  Goal: Develops/Participates in Therapeutic [place] to Support Successful Transition  Outcome: Progressing  Intervention: Foster Therapeutic [place]  Recent Flowsheet Documentation  Taken 1/5/2025 1000 by Vera Graham RN  Trust Relationship/Rapport:   care explained   choices provided  Intervention: Foster Therapeutic [place]  Recent Flowsheet Documentation  Taken 1/5/2025 1000 by Vera Graham  RN  Trust Relationship/Rapport:   care explained   choices provided       Goal Outcome Evaluation:    Plan of Care Reviewed With: patient     Patient presented with a flat and blunted affect. Patient denied pain. Patient denied all mental health symptoms and contracted for safety. The patient's hygiene was appropriate. His fluid and food intake was adequate. Patient was medication complaint. He was seen in the lounge area, watching TV and socializing with peers. He also was isolative and withdrawn to his room sometimes. One of the patient's daughters stopped by to help fix his iPad so that patient could do Facetime with family. This was shortly before lunch. The iPad was collected from patient to be placed back in safe in his locker, but patient took an office and became very irritated, and said he wanted the iPad to be with him in his safe. Patient was redirected. He was told that the iPad was going to be given back to him after lunch. Patient agreed and went quietly to his room. There was no behavioral escalations or safety concerns noted this shift. Will continue the current care plan and and notify provider as needed.

## 2025-01-05 NOTE — PLAN OF CARE
"Goal Outcome Evaluation:    Plan of Care Reviewed With: patient    Patient was isolative in his room at the beginning of the shift. Patient did not attend therapy group.When this writer approached patient for mental health assessment in his arnav, patient told the writer, \"I have been watching eagles flying\". Patient denied pain. Patient denied bowel and bladder issues. Patient denied mental health signs and symptoms. Contracts for safety. Patient's daughter called a couple times requesting to face time [id number]. According to patient care order, patient can face time family using unit Ipad. Family states that Jolie told them that patient can use his own I pad. Patient was approached and updated regarding family wanting to face time. Patient appeared disorganized and did not understand. Writer showed patient his I pad. Patient declined but agreed later. Writer and another staff attempted to login into patient's I pad without success. Writer called the family to ask for I pad access password and pass code. Writer and another staff attempted without success. Patient's family (Maria C) said that she will  patient's I pad tomorrow. Patient called his family. Patient asked this writer after phone conversation; \"do you have sites to sell to the , do you have a program buying property\". It is difficulty to tell whether I pad will be helpful considering patient's disorganization. Duration and frequency of I pad use will be beneficial to staff and family. Patient was med compliant. Vitals were within normal limit.               "

## 2025-01-06 PROCEDURE — 250N000013 HC RX MED GY IP 250 OP 250 PS 637

## 2025-01-06 PROCEDURE — 99231 SBSQ HOSP IP/OBS SF/LOW 25: CPT | Mod: GC | Performed by: PSYCHIATRY & NEUROLOGY

## 2025-01-06 PROCEDURE — 124N000002 HC R&B MH UMMC

## 2025-01-06 RX ADMIN — OLANZAPINE 5 MG: 5 TABLET, ORALLY DISINTEGRATING ORAL at 20:08

## 2025-01-06 RX ADMIN — CINACALCET 30 MG: 30 TABLET ORAL at 09:02

## 2025-01-06 RX ADMIN — CLONIDINE HYDROCHLORIDE 0.1 MG: 0.1 TABLET ORAL at 20:08

## 2025-01-06 RX ADMIN — FUROSEMIDE 40 MG: 40 TABLET ORAL at 09:02

## 2025-01-06 RX ADMIN — Medication 25 MG: at 20:08

## 2025-01-06 RX ADMIN — LISINOPRIL 40 MG: 10 TABLET ORAL at 10:00

## 2025-01-06 RX ADMIN — ATORVASTATIN CALCIUM 40 MG: 20 TABLET, FILM COATED ORAL at 09:01

## 2025-01-06 RX ADMIN — METOPROLOL SUCCINATE 50 MG: 50 TABLET, EXTENDED RELEASE ORAL at 09:01

## 2025-01-06 RX ADMIN — AMLODIPINE BESYLATE 10 MG: 5 TABLET ORAL at 09:02

## 2025-01-06 RX ADMIN — Medication 1 PATCH: at 09:01

## 2025-01-06 RX ADMIN — CLONIDINE HYDROCHLORIDE 0.1 MG: 0.1 TABLET ORAL at 09:01

## 2025-01-06 RX ADMIN — MELATONIN TAB 3 MG 3 MG: 3 TAB at 20:08

## 2025-01-06 ASSESSMENT — ACTIVITIES OF DAILY LIVING (ADL)
ADLS_ACUITY_SCORE: 66
ADLS_ACUITY_SCORE: 66
ORAL_HYGIENE: INDEPENDENT
ADLS_ACUITY_SCORE: 66
ORAL_HYGIENE: INDEPENDENT
ADLS_ACUITY_SCORE: 66
DRESS: INDEPENDENT
ADLS_ACUITY_SCORE: 66
HYGIENE/GROOMING: HANDWASHING;INDEPENDENT
ADLS_ACUITY_SCORE: 66
LAUNDRY: WITH SUPERVISION
ADLS_ACUITY_SCORE: 66
DRESS: INDEPENDENT
HYGIENE/GROOMING: INDEPENDENT

## 2025-01-06 NOTE — PLAN OF CARE
"  Problem: Adult Behavioral Health Plan of Care  Goal: Plan of Care Review  Outcome: Progressing  Flowsheets  Taken 1/6/2025 1254  Plan of Care Reviewed With: patient  Taken 1/6/2025 1000  Patient Agreement with Plan of Care: other (see comments)  Goal: Patient-Specific Goal (Individualization)  Description: You can add care plan individualizations to a care plan. Examples of Individualization might be:  \"Parent requests to be called daily at 9am for status\", \"I have a hard time hearing out of my right ear\", or \"Do not touch me to wake me up as it startles  me\".  Outcome: Progressing  Goal: Adheres to Safety Considerations for Self and Others  Outcome: Progressing  Intervention: Develop and Maintain Individualized Safety Plan  Recent Flowsheet Documentation  Taken 1/6/2025 1000 by Vera Graham RN  Safety Measures: environmental rounds completed  Intervention: Develop and Maintain Individualized Safety Plan  Recent Flowsheet Documentation  Taken 1/6/2025 1000 by Vera Graham RN  Safety Measures: environmental rounds completed  Goal: Absence of New-Onset Illness or Injury  Outcome: Progressing  Intervention: Identify and Manage Fall Risk  Recent Flowsheet Documentation  Taken 1/6/2025 1000 by Vera Graham RN  Safety Measures: environmental rounds completed  Intervention: Prevent VTE (Venous Thromboembolism)  Recent Flowsheet Documentation  Taken 1/6/2025 1000 by Vera Graham RN  VTE Prevention/Management: SCDs off (sequential compression devices)  Intervention: Prevent VTE (Venous Thromboembolism)  Recent Flowsheet Documentation  Taken 1/6/2025 1000 by Vera Graham RN  VTE Prevention/Management: SCDs off (sequential compression devices)  Goal: Optimized Coping Skills in Response to Life Stressors  Outcome: Progressing  Intervention: Promote Effective Coping Strategies  Recent Flowsheet Documentation  Taken 1/6/2025 1000 by Vera Graham RN  Supportive Measures: active " listening utilized  Intervention: Promote Effective Coping Strategies  Recent Flowsheet Documentation  Taken 1/6/2025 1000 by Vera Graham RN  Supportive Measures: active listening utilized  Goal: Develops/Participates in Therapeutic [place] to Support Successful Transition  Outcome: Progressing  Intervention: Foster Therapeutic [place]  Recent Flowsheet Documentation  Taken 1/6/2025 1000 by Vera Graham RN  Trust Relationship/Rapport:   care explained   choices provided   emotional support provided   empathic listening provided   questions answered   questions encouraged   reassurance provided   thoughts/feelings acknowledged  Intervention: Foster Therapeutic [place]  Recent Flowsheet Documentation  Taken 1/6/2025 1000 by Vera Graham RN  Trust Relationship/Rapport:   care explained   choices provided   emotional support provided   empathic listening provided   questions answered   questions encouraged   reassurance provided   thoughts/feelings acknowledged         Goal Outcome Evaluation:    Plan of Care Reviewed With: patient     The patient presented with a flat and blunted affect. His mood was calm. He denied pain or any physical discomfort. He denied SI/HI/SIB/VH/AH.He denied anxiety and depression and contracted for safety.  Patient was able to make his needs know. He is medication compliant. His hygiene was appropriate. He was eating and drinking  adequately. There was no behavioral escalations or safety concerns during the shift. Patient spent most of his shift in the lounge interacting with a few number of peers and also watching TV. There was no behavioral escalation or safety concerns noted this shift. /81 (BP Location: Left arm, Patient Position: Sitting, Cuff Size: Adult Large)   Pulse 63   Temp 97.8  F (36.6  C) (Oral)   Resp 16   Wt 113 kg (249 lb 1.6 oz)   SpO2 96%   BMI 40.21 kg/m

## 2025-01-06 NOTE — PROVIDER NOTIFICATION
01/06/25 1117   Individualization/Patient Specific Goals   Patient Personal Strengths resourceful;resilient;positive vocational history;ability to maintain sobriety   Patient Vulnerabilities housing insecurity;lacks insight into illness;poor impulse control   Interprofessional Rounds   Summary Discussed patient progress and barriers to discharge. Family clemente Hernandez and submitted an application. Pt needs MnCHOICE assessment completed before their RN will come do assessment with pt.   Participants nursing;CTC;psychiatrist;other (see comments)   Behavioral Team Discussion   Participants Dr. Smith; Dr. Juarez; Sunil Graham RN; Kylee Becerra MA [facility]; medical students   Progress Pt has progressed   Anticipated length of stay 60+ days   Continued Stay Criteria/Rationale Medication management; symptom stabilization; care coordination   Medical/Physical See H&P   Precautions See below   Plan Psychiatric assessment/Medication management. Therapeutic Milieu. Individual care planning and after care planning. Patient to participate in unit groups and activities. Individual and group support on unit.   Safety Plan Completed by unit therapist prior to discharge     PRECAUTIONS AND SAFETY    Behavioral Orders   Procedures    Code 1 - Restrict to Unit    Routine Programming     As clinically indicated    Status 15     Every 15 minutes.       Safety  Safety WDL: WDL  Patient Location: [facility]  Observed Behavior: angry/hostile  Observed Behavior (Comment): Watching tv  Airway Safety Measures: other (see comments)  Safety Measures: environmental rounds completed  Diversional Activity: television  De-Escalation Techniques: appropriate behavior reinforced  Suicidality: Status 15  Assault: private room  Elopement Assessment: Loitering near exit doors, Statements about wanting to leave  Elopement Interventions: status 15

## 2025-01-06 NOTE — PLAN OF CARE
BEH IP Unit Acuity Rating Score (UARS)  Patient is given one point for every criteria they meet.    CRITERIA SCORING   On a 72 hour hold, court hold, committed, stay of commitment, or revocation. 1    Patient LOS on BEH unit exceeds 20 days. 1  LOS: 86   Patient under guardianship, 55+, otherwise medically complex, or under age 11. 1   Suicide ideation without relief of precipitating factors. 0   Current plan for suicide. 0   Current plan for homicide. 0   Imminent risk or actual attempt to seriously harm another without relief of factors precipitating the attempt. 1   Severe dysfunction in daily living (ex: complete neglect for self care, extreme disruption in vegetative function, extreme deterioration in social interactions). 1   Recent (last 7 days) or current physical aggression in the ED or on unit. 0   Restraints or seclusion episode in past 72 hours. 0   Recent (last 7 days) or current verbal aggression, agitation, yelling, etc., while in the ED or unit. 0   Active psychosis. 1   Need for constant or near constant redirection (from leaving, from others, etc).  0   Intrusive or disruptive behaviors. 1   Patient requires 3 or more hours of individualized nursing care per 8-hour shift (i.e. for ADLs, meds, therapeutic interventions). 1   TOTAL 8

## 2025-01-06 NOTE — PROGRESS NOTES
----------------------------------------------------------------------------------------------------------  [facility]  Psychiatry Progress Note  Hospital Day #86     Interim History:     The patient's care was discussed with the treatment team and chart notes were reviewed.    Identifier: Estevan Aaron is a 64 year old male with previous psychiatric diagnoses of  generalized anxiety disorder, Neurocognitive disorder, admitted from the ED 10/12/2024 due to concern for HI and psychosis in the context of medical issues (hyperthyroidism, hypercalcemia) psychosocial stressors including family dynamics with recent possible divorce.    Sleep: 6.5 hours (01/06/25 0600)  Scheduled medications: Took all scheduled medications as prescribed  Psychiatric PRN medications:   Last 24H PRN:     OLANZapine (zyPREXA) tablet 5 mg, 5 mg at 01/05/25 1645 **OR** OLANZapine (zyPREXA) injection 5 mg    Staff Report:   Overnight events: DARREN Team was called due to Santos becoming disoriented and upset due to the lounge area being shut down because of verbal aggression between other two patients. After use of various attempts with de-escalation techniques and Zyprexa orally,Santos was able to be redirected. Over the weekend, Santos was confused and somehow disorganized in the evenings, but redirectionable.    See staff notes for more information     Subjective:     Patient Interview:  Santos was meet at the milieu. He mentions feeling good, and when his food came he kindly offered some pizza slices to the team. He mentions to be sleeping well and he is eating fine. Santos also mentions to not feel anxiety before or currently. No other concerns reported.    ROS:  See above     Objective:     Vitals:  /70   Pulse 63   Temp 97.9  F (36.6  C) (Oral)   Resp 16   Wt 113 kg (249 lb 1.6 oz)   SpO2 94%   BMI 40.21 kg/m      Allergies:  No Known Allergies    Current Medications:  Scheduled:  Current  Facility-Administered Medications   Medication Dose Route Frequency Provider Last Rate Last Admin    acetaminophen (TYLENOL) tablet 650 mg  650 mg Oral Q4H PRN Nikki Caceres MD   650 mg at 01/05/25 0432    alum & mag hydroxide-simethicone (MAALOX) suspension 30 mL  30 mL Oral Q4H PRN Nikki Caceres MD   30 mL at 10/17/24 0837    amLODIPine (NORVASC) tablet 10 mg  10 mg Oral Daily Presley Barrera MD   10 mg at 01/05/25 0751    atorvastatin (LIPITOR) tablet 40 mg  40 mg Oral Daily Nikki Caceres MD   40 mg at 01/05/25 0752    cholecalciferol (VITAMIN D3) capsule 1,250 mcg  1,250 mcg Oral Q7 Days SirishaEveliaDO   1,250 mcg at 12/31/24 1426    cinacalcet (SENSIPAR) tablet 30 mg  30 mg Oral Daily Presley Barrera MD   30 mg at 01/05/25 0751    cloNIDine (CATAPRES) tablet 0.1 mg  0.1 mg Oral BID Presley Barrera MD   0.1 mg at 01/05/25 2036    diclofenac (VOLTAREN) 1 % topical gel 2 g  2 g Topical 4x Daily PRN Rocío Lopez MD   2 g at 12/22/24 2032    furosemide (LASIX) tablet 40 mg  40 mg Oral Daily Presley Barrera MD   40 mg at 01/05/25 0751    gabapentin (NEURONTIN) capsule 100 mg  100 mg Oral Q6H PRN Nikki Caceres MD   100 mg at 12/24/24 0046    hydrocortisone (CORTAID) 0.5 % cream   Topical Daily PRN Savi Chamorro MD        Lidocaine (LIDOCARE) 4 % Patch 1 patch  1 patch Transdermal Q24H PRN Rocío Lopez MD   1 patch at 12/22/24 2023    lisinopril (ZESTRIL) tablet 40 mg  40 mg Oral Daily Presley Barrera MD   40 mg at 01/05/25 0752    melatonin tablet 3 mg  3 mg Oral At Bedtime Presley Barrera MD   3 mg at 01/05/25 2035    metoprolol succinate ER (TOPROL XL) 24 hr tablet 50 mg  50 mg Oral Daily Presley Barrera MD   50 mg at 01/05/25 0751    nicotine (NICODERM CQ) 14 MG/24HR 24 hr patch 1 patch  1 patch Transdermal Daily Evelia Grimm DO   1 patch at 01/05/25 0755    nicotine (NICORETTE) gum 2 mg  2 mg Buccal Q1H PRN González Garcia MD   2 mg at  10/24/24 1254    OLANZapine zydis (zyPREXA) ODT tab 5 mg  5 mg Oral At Bedtime González Garcia MD   5 mg at 01/05/25 2036    Or    OLANZapine (zyPREXA) injection 10 mg  10 mg Intramuscular At Bedtime González Garcia MD        OLANZapine (zyPREXA) tablet 5 mg  5 mg Oral TID PRN Evelia Grimm DO   5 mg at 01/05/25 1645    Or    OLANZapine (zyPREXA) injection 5 mg  5 mg Intramuscular TID PRN Evelia Grimm DO        polyethylene glycol (MIRALAX) Packet 17 g  17 g Oral Daily PRN Nikki Caceres MD        traZODone (DESYREL) half-tab 25 mg  25 mg Oral At Bedtime Rocío Lopez MD   25 mg at 01/05/25 2035    traZODone (DESYREL) half-tab 25 mg  25 mg Oral At Bedtime PRN Evelia Grimm DO   25 mg at 12/24/24 0046       PRN:  Current Facility-Administered Medications   Medication Dose Route Frequency Provider Last Rate Last Admin    acetaminophen (TYLENOL) tablet 650 mg  650 mg Oral Q4H PRN Nikki Caceres MD   650 mg at 01/05/25 0432    alum & mag hydroxide-simethicone (MAALOX) suspension 30 mL  30 mL Oral Q4H PRN Nikki Caceres MD   30 mL at 10/17/24 0837    amLODIPine (NORVASC) tablet 10 mg  10 mg Oral Daily Presley Barrera MD   10 mg at 01/05/25 0751    atorvastatin (LIPITOR) tablet 40 mg  40 mg Oral Daily Nikki Caceres MD   40 mg at 01/05/25 0752    cholecalciferol (VITAMIN D3) capsule 1,250 mcg  1,250 mcg Oral Q7 Days Evelia Grimm DO   1,250 mcg at 12/31/24 1426    cinacalcet (SENSIPAR) tablet 30 mg  30 mg Oral Daily Presley Barrera MD   30 mg at 01/05/25 0751    cloNIDine (CATAPRES) tablet 0.1 mg  0.1 mg Oral BID Presley Barrera MD   0.1 mg at 01/05/25 2036    diclofenac (VOLTAREN) 1 % topical gel 2 g  2 g Topical 4x Daily PRN Rocío Lopez MD   2 g at 12/22/24 2032    furosemide (LASIX) tablet 40 mg  40 mg Oral Daily Presley Barrera MD   40 mg at 01/05/25 0751    gabapentin (NEURONTIN) capsule 100 mg  100 mg Oral Q6H PRN Nikki Caceres MD   100 mg at  12/24/24 0046    hydrocortisone (CORTAID) 0.5 % cream   Topical Daily PRN Savi Chamorro MD        Lidocaine (LIDOCARE) 4 % Patch 1 patch  1 patch Transdermal Q24H PRN Rocío Lopez MD   1 patch at 12/22/24 2023    lisinopril (ZESTRIL) tablet 40 mg  40 mg Oral Daily Presley Barrera MD   40 mg at 01/05/25 0752    melatonin tablet 3 mg  3 mg Oral At Bedtime Presley Barrera MD   3 mg at 01/05/25 2035    metoprolol succinate ER (TOPROL XL) 24 hr tablet 50 mg  50 mg Oral Daily Presley Barrera MD   50 mg at 01/05/25 0751    nicotine (NICODERM CQ) 14 MG/24HR 24 hr patch 1 patch  1 patch Transdermal Daily Evelia Grimm DO   1 patch at 01/05/25 0755    nicotine (NICORETTE) gum 2 mg  2 mg Buccal Q1H PRN González Garcia MD   2 mg at 10/24/24 1254    OLANZapine zydis (zyPREXA) ODT tab 5 mg  5 mg Oral At Bedtime González Garcia MD   5 mg at 01/05/25 2036    Or    OLANZapine (zyPREXA) injection 10 mg  10 mg Intramuscular At Bedtime González Garcia MD        OLANZapine (zyPREXA) tablet 5 mg  5 mg Oral TID PRN Evelia Grimm DO   5 mg at 01/05/25 1645    Or    OLANZapine (zyPREXA) injection 5 mg  5 mg Intramuscular TID PRN Evelai Grimm DO        polyethylene glycol (MIRALAX) Packet 17 g  17 g Oral Daily PRN Nikki Caceres MD        traZODone (DESYREL) half-tab 25 mg  25 mg Oral At Bedtime Rocío Lopez MD   25 mg at 01/05/25 2035    traZODone (DESYREL) half-tab 25 mg  25 mg Oral At Bedtime PRN Evelia Grimm DO   25 mg at 12/24/24 0046     Labs and Imaging:  Data this admission:  - CBC unremarkable  - CMP unremarkable  - TSH normal  - UDS negative  - Vit D low  - Hgb A1c 5.9 (10/13/24)  - Lipids unremarkable  - Vit B12 normal  - Folate normal  - Urinalysis unremarkable  - EKG normal sinus rhythm, QTc 390 ms  - Head CT showed no acute changes  - HIV non reactive  - Treponema antibody non reactive  - ESR wnl   - Ceruloplasmin wnl   - BOOGIE negative  - Lyme negative     "  Parathyroid hormone:   10/13: 85     Calcium:  10/14: 11.4  10/16: 10.3  10/17: 10.3  10/19: 9.8  10/21: 10.6  10/23: 10.3     Albumin:  10/14: 4.3  10/16: 4.3  10/17: 4.1  10/19: 4.2  10/21: 4.5  10/23: 4.3      CXR:   Impression:   1. No definite radiographic evidence of asbestosis. If clinically  indicated, consider evaluation with high resolution chest CT.  2. Nonspecific left costophrenic blunting. Given symmetrically low  lung volumes may be related to poor inspiratory effort/timing.  3. Pulmonary vascular congestion.     MRI:   IMPRESSION:  1. No acute intracranial pathology.  2. No focal lesion or structural abnormality.   3. Symmetric frontoparietal cortical volume loss slightly more than  expected for age.     Mental Status Exam:   Oriented to: Oriented to self only  General:  Awake and Alert  Appearance:  appears stated age, Grooming is adequate, and Dressed in his own clothes  Behavior/Attitude:  Disengaged, Easy to redirect, and Easily distracted  Eye Contact: Downcast distracted  Psychomotor: No evidence of tics, dystonia, or tardive dyskinesia  no catatonia present  Speech:  appropriate volume/tone, talkative, and spontaneous , apraxia of speech  Language: Fluent in English with appropriate syntax and vocabulary.  Mood:  \"good\"  Affect: congruent with mood, appropriate  Thought Process:  perseverative, tangential, and at times confused, but mostly, at nights  Thought Content:    delusion of confusion . Patient denies paranoia  Associations:  loose  Insight:  impaired   Judgment:  impaired   Impulse control: limited  Attention Span:   mildly decreased  Concentration:   distractible  Recent and Remote Memory:   remote memory intact, recent memory impaired  Fund of Knowledge: average  Muscle Strength and Tone: normal  Gait and Station: Normal     Psychiatric Assessment     Estevan Aaron is a 64 year old male with previous psychiatric diagnoses of GEORGINA admitted from the ER on 10/12/2024 due to concern " for HI and psychosis in the context of medical issues (hyperthyroidism, hypercalcemia), psychosocial stressors including with recent divorce and selling his home. This is the patient's first psychiatric hospitalization. The MSE on admission was pertinent for a thought process which was perseverative, circumstantial, tangential, disorganized and tangential; with rambling and looseness of associations. Psychological contributions to presentation included lack of insight. Social factors contributing to presentation included isolation, recent divorce, selling his house, and moving from hotel to hotel. Biological contributions to presentation included a history of hyperparathyroidism with chronic hypercalcemia per charts as well as a history of methamphetamine use per collateral from ex-wife.     Per collateral from ex-wife, Santos has had paranoid ideations since they first met. He has always felt like people are out to get him or trying to rip him off. She says that he has had visual hallucinations (he will point things out that the rest of the family can't see) for a long time, but the family would just go along with him to avoid making him angry. This timeline and presentation could be consistent with diagnosis of paranoid personality disorder. Things began to worsen around the beginning of the COVID pandemic, when Santos became more isolated and the family started to notice cognitive issues such as impaired memory. Other things that could be contributing to his presentation is hyperparathyroidism with hypercalcemia. However, patient's calcium returned to normal limits on 10/16, and patient continues to have disorganized behavior unrelated to calcium elevation, so likely this is not a significant contributing factor to patient's presentation.      Currently, Santos is at times disoriented, but easily re-directable. Presents with some difficulty of word articulation, which contributes to his frustration when interacting with  psych team, as he finds it difficult to explain himself. He is able to take care of his ADLs without much assistance and he is mostly independent in the unit. On the other hand, his confused behavior tend to increase in the evenings, seemingly related to  Neurocognitive Disorder diagnosis, and this could evolved to be his new baseline. Either way, Santos does not present as a danger to himself or other so his SIO was discontinued. Santos does not present with any new episodes of physical agitation and is able to attend groups and interact with peers without major altercations. Patient currently is stable with current neuroleptic dose, patient probably wont benefit from any modification in current therapy.     Family care meeting on 01/02/25: update on current availability of placement options and Santos's current presentation. Family aware of Santos's level of functionality with ADLs, besides  disorientation episodes at evenings, that only requires minimal intervention for redirection.     Goal of treatment:  Procure a memory care placement.        Psychiatric Plan by Diagnosis   Today's changes:  - none    # Major Neurocognitive Disorder  1. Medications:  - Olanzapine 5 mg at bedtime  - Neurology consult 11/8/24, recommend outpatient follow-up and neuropsychiatric testing     2. Pertinent Labs/Monitoring:   - Re-eval Calcium parameters tomorrow     3. Additional Plans:  - Patient will be treated in therapeutic milieu with appropriate individual and group therapies as described  - Patient is Commitment with [facility]  - [facility] meds: Haldol, Clozaril, Risperdal, Invega, Zyprexa, Seroquel, Abilify  - 12/12/24: Psych team had phone call meeting with Memory Care facility personnel  - Pending responses of memory care facilities toured by family  - MNChoice Assessment on Friday  01/10/25     # Unspecified anxiety vs Generalized Anxiety Disorder  - Monitor for symptoms.  - Fluoxetine held due to suspicion of ongoing manic symptoms      Psychiatric Hospital Course:      Estevan Aaron was admitted to [facility] on a 72 hour hold.   Medications:  PTA fluoxetine was held due to concern for worsening of zelalem   New medications started at the time of admission include Zyprexa.   Increased olanzapine 10 mg at bedtime was to 10 mg BID (10/14)   Increased olanzapine 10 mg BID to 10 mg during day and 15 mg at bedtime (10/15)  10/21: increased olanzapine pm dose from 15 to 20 mg  10/23: started melatonin 3 mg at 7 pm to help patient with circadian rhythm as he has been staying up throughout the night and sleeping a lot during the day and there is some concern for delirium   10/24: consulted anesthesia for MRI brain   10/28: MRI brain complete, decrease AM olanzapine from 10 to 5 mg  10/29: Morning olanzapine decreased to 2.5 mg, evening olanzapine decreased to 15 mg   11/1: Decreased evening olanzapine to 10 mg   11/4: Decreased evening olanzapine to 7.5 mg   11/5: Decreased evening olanzapine to 5 mg   11/11: Moved morning dose of olanzapine to the afternoon as patient appears more agitated in the afternoons/evenings  11/21: Started memantine 5 mg daily   11/26: Discontinued olanzapine 2.5 mg during the afternoon  12/2:   Discontinued memantine 5 mg, daily  12/30: Discontinued O     Care conference 10/18/24 with daughter Maria C and ex-wife Clifford  - Report that patient has always been paranoid since ex-wife first met him. She describes him always feeling like he is getting ripped off.   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  They reported once incidence in which he made a bid on a job and the client paid him, but he never ended up finishing the job  - Wife and him  officially this year, and since selling the house several months ago, patient has been moving from hotel to hotel due to thinking people are out to get him   - When they were selling their house, patient threatened realtors and felt he was being ripped off   - Clifford reports that she started to be afraid to be around him alone  - When he was found in the hotel, he had a generator full of gasoline, binoculars, bullets, and hunting equipment.     10/21/24: updated daughter on Santos's treatment plan      10/23/24: updated daughter on Santos's treatment plan      10/25/24: updated daughter on Santos's treatment plan, including plan to try and get MRI brain done under sedation. She said that Santos's grandfather had dementia and his sister has a brain tumor.      10/28/24: updated daughter on Santos's MRI results. She is planning on coming into town soon.      Care conference 11/1/24 with daughters Maria C and Estefani and ex-wife Clifford  - Discussed dementia diagnosis   - Clifford asked about plans moving forward. Explained that applying for medical assistance is the first step. Explained that application processing time can be very variable. Informed family that Santos will most likely be staying on this inpatient unit during this time.   - Clifford expressed that their family would like to be the power of /have conservatorship for Santos.   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 Reports that he has bills that he has not paid.   - Maria C is planning to move to [place], and Clifford currently lives in [place]. Family prefer that Santos would in a facility that are near these locations. Discussed with family that they can also try to request a specific location (memory care).   - Clifford reports that he had previously  gotten help applying for his social security and medicare, but unsure if it had been approved.   - Informed family that there is a geriatric unit and there might be a transfer if there becomes availability on this unit.   - Family shared that he was completely different just two months ago.   - Estefani shared that his family found his medications in his old truck recently, expressed that it was likely that he was not taking his medications prior to his hospitalization.      11/6/24: updated Maria C on plan to try and transfer Santos to Geriatric unit.      11/11/24: updated Maria C on neurology consult      The risks, benefits, alternatives, and side effects were discussed and understood by the patient and other caregivers.     Medical Assessment and Plan     Medical diagnoses to be addressed this admission:    # Hip Pain  - New right hip pain, and mild pain in multiple joints. Moderate response to topical antiinflammatory gel and lidocaine patch.   - Medicine consulted for recommendations 12/20    # CHF  # Hypertension  - Continue PTA medications  Furosemide 40 mg daily  Lisinopril 40 mg daily  Metoprolol 50 mg daily  Amlodipine 10 mg daily  Clonidine 0.1 mg BID  - Pitting edema in lower extremities, not painful 3+: Medicine consulted for recommendations 12/20    # Hyperlipidemia  - Continue PTA Atorvastatin 40 mg     # Primary Hyperparathyroidism  # Hypercalcemia, hypophosphoremia   Increased Ca level to 10.9 and decreased Ph level to 2.3 in the ED. Suspicion patient's hypercalcemia could be contributing to symptoms of psychosis.  - Consulted endocrinology, who started cinacalcet 30 mg BID on 10/13  - 10/16 endocrinology recommended continuing to trend calcium and albumin to make sure patient does not become hypocalcemic and recommended holding cinacalcet   - 10/17, calcium and albumin wnl, endo recommended cinacalcet 30 mg once daily   - 10/21, per endo continue cincaclcet and recheck calcium and albumin on October  24  - 10/23: per endo, patient needs to follow up outpatient for further management of hyperparathyroidism      Medical course: Patient was physically examined by the ED prior to being transferred to the unit and was found to be medically stable and appropriate for admission.      Consults: Psychiatry, Endocrinology (follow-up of hyperthyroidism / hypercalcemia and hypertension), neurology     Checklist     Legal Status: Committed   [facility] Commitment with [facility]  File Number: [id number]  Start and expiration date of commitment: 10/24/24 - 04/24/25     [facility] meds: Haldol, Clozaril, Risperdal, Invega, Zyprexa, Seroquel, Abilify     PPS/CM:  Shelby Chowdary: [phone]  [email or web address].    Guardian/Conservator:  Clifford Aaron is currently emergency until 01/20/2025.       Safety Assessment:   Behavioral Orders   Procedures    Code 1 - Restrict to Unit    Routine Programming     As clinically indicated    Status 15     Every 15 minutes.       Risk Assessment:  Risk for harm is low  Risk factors: impulsive and past behaviors  Protective factors: family      SIO: No    Disposition: Pending stabilization, medication optimization, & development of a safe discharge plan.     Attestations     This patient was seen and discussed with my attending physician.  Rocío Orellana MD  [facility] Psychiatry Resident  01/06/2025    Attestation:  This patient has been seen and evaluated by me, Pete Smith MD.  I have discussed this patient with the house staff team including the resident and/or medical student and I agree with the findings and plan in this note.    I have reviewed today's vital signs, medications, labs and imaging. Pete Smith MD , PhD.

## 2025-01-06 NOTE — PLAN OF CARE
Team Note Due:  Monday    Assessment/Intervention/Current Symtoms and Care Coordination:  Chart review and met with team, discussed pt progress, symptomology, and response to treatment.  Discussed the discharge plan and any potential impediments to discharge. Clifford Aaron is currently emergency guardian/conservator until 01/20/2025.    Family toured [facility] and put a deposit to hold the unit for 2 weeks. [facility] requires the [facility] assessment to be completed before their RN will come do an assessment with pt.    Contacted [employer]  requesting assessment to be rescheduled. Ka can come on Friday at 10am. Provided contact information for Clifford as pt's legal guardian.    Discharge Plan or Goal:  Memory care facility     Barriers to Discharge:  Patient requires further psychiatric stabilization due to current symptomology, medication management with changes subject to provider, coordination with outside supports, and aftercare planning. Pt is under civil commitment.     Referral Status:    Memory Care facilities currently in process:  [facility]. Unit being held for 2 weeks as of 1/4/25. RN assessment pending completion of [facility] assessment.  [facility]. Tour completed 11/27.  [facility]. Tour completed 11/29.  [facility]. Tour completed 11/30. Willing to review records if family wants to move forward.    Memory Care facilities pending family review:  [facility].  [facility].  [facility].  [facility].  [facility].    Memory Care facilities declined:  [facility] (private pay only, no EW).  [facility] (private pay only, no EW).  [facility] (no openings).  [facility]. Tour completed 11/29. Records sent 12/2/24. Declined 12/19/24 (don't feel they are an appropriate facility for  pt's needs).  Lorraine (): [email or web address]; [phone]  Isatu (director of nursing): [email or web address]  [facility].  Denied 12/26 (concerned about dementia with psychosis, history of hallucinations, high elopement risk, still on 1:1).  Nikki Noel: [email or web address]; Office [phone], Fax [phone]     Legal Status:  MI Commitment with [facility]  File Number: [id number]  Start and expiration date of commitment: 10/24/24 - 04/24/25    [facility] meds: Haldol, Clozaril, Risperdal, Invega, Zyprexa, Seroquel, Abilify    PPS/CM:  Shelby Chowdary: [phone]  [email or web address].    Contacts:  Maria C Aaron (Daughter): [phone]   Clifford Agnieszka (ex-wife): [phone]     No Del Angel (guardianship/conservatorship ): [phone]  [email or web address]    Derrell Dfuf ([facility] ): [phone]  [email or web address]     Upcoming Meetings and Dates/Important Information and next steps:  [facility] Assessment Friday 1/10/25 at 10am.  Follow up with family regarding list of Memory Care facilities  Discharge planning when appropriate  Pt does not qualify for MA per financial counselor on 11/8/2024. Pt will likely privately pay for Memory Care until he qualifies for MA/waivers in the future.    Provisional Discharge and Change of Status needed at discharge

## 2025-01-06 NOTE — PLAN OF CARE
Problem: Sleep Disturbance  Goal: Adequate Sleep/Rest  Outcome: Progressing   Goal Outcome Evaluation:    Patient appears to have slept a total of 6.5 hours.     Bed linens changed, given new scrub top and socks. Pt encouraged to sleep in his bed but spent most of night sleeping in the lounge.     Safety/environment checks conducted every 15 minutes with no further concerns noted. No complaints of pain/discomfort.

## 2025-01-07 PROCEDURE — 250N000013 HC RX MED GY IP 250 OP 250 PS 637

## 2025-01-07 PROCEDURE — 99231 SBSQ HOSP IP/OBS SF/LOW 25: CPT | Mod: GC | Performed by: PSYCHIATRY & NEUROLOGY

## 2025-01-07 PROCEDURE — 124N000002 HC R&B MH UMMC

## 2025-01-07 RX ADMIN — Medication 1 PATCH: at 07:50

## 2025-01-07 RX ADMIN — AMLODIPINE BESYLATE 10 MG: 5 TABLET ORAL at 07:44

## 2025-01-07 RX ADMIN — Medication 25 MG: at 20:16

## 2025-01-07 RX ADMIN — METOPROLOL SUCCINATE 50 MG: 50 TABLET, EXTENDED RELEASE ORAL at 07:44

## 2025-01-07 RX ADMIN — Medication 1250 MCG: at 13:00

## 2025-01-07 RX ADMIN — OLANZAPINE 5 MG: 5 TABLET, ORALLY DISINTEGRATING ORAL at 20:17

## 2025-01-07 RX ADMIN — CINACALCET 30 MG: 30 TABLET ORAL at 07:44

## 2025-01-07 RX ADMIN — LISINOPRIL 40 MG: 10 TABLET ORAL at 07:44

## 2025-01-07 RX ADMIN — CLONIDINE HYDROCHLORIDE 0.1 MG: 0.1 TABLET ORAL at 07:44

## 2025-01-07 RX ADMIN — MELATONIN TAB 3 MG 3 MG: 3 TAB at 20:17

## 2025-01-07 RX ADMIN — CLONIDINE HYDROCHLORIDE 0.1 MG: 0.1 TABLET ORAL at 20:17

## 2025-01-07 RX ADMIN — ATORVASTATIN CALCIUM 40 MG: 20 TABLET, FILM COATED ORAL at 07:44

## 2025-01-07 RX ADMIN — FUROSEMIDE 40 MG: 40 TABLET ORAL at 07:44

## 2025-01-07 ASSESSMENT — ACTIVITIES OF DAILY LIVING (ADL)
ADLS_ACUITY_SCORE: 66
ADLS_ACUITY_SCORE: 66
LAUNDRY: UNABLE TO COMPLETE
ADLS_ACUITY_SCORE: 66
ORAL_HYGIENE: INDEPENDENT
ADLS_ACUITY_SCORE: 66
DRESS: INDEPENDENT;STREET CLOTHES
ADLS_ACUITY_SCORE: 66
HYGIENE/GROOMING: INDEPENDENT
ADLS_ACUITY_SCORE: 66

## 2025-01-07 NOTE — PROGRESS NOTES
----------------------------------------------------------------------------------------------------------  [facility]  Psychiatry Progress Note  Hospital Day #87     Interim History:     The patient's care was discussed with the treatment team and chart notes were reviewed.    Identifier: Estevan Aaron is a 64 year old male with previous psychiatric diagnoses of  generalized anxiety disorder, Neurocognitive disorder, admitted from the ED 10/12/2024 due to concern for HI and psychosis in the context of medical issues (hyperthyroidism, hypercalcemia) psychosocial stressors including family dynamics with recent possible divorce.    Sleep: 7 hours (01/07/25 0600)  Scheduled medications: Took all scheduled medications as prescribed  Psychiatric PRN medications: None     Staff Report:   Santos was alert and able to communicate needs, intermittently confused. He was visible in milieu all shift, he was social and interactive with peers and staff members. Labile mood, argumentative with staff members at times, overall cooperative and medication compliant. He denied any pain or discomfort, appeared in no distress, ADLs WNL.     See staff notes for more information     Subjective:     Patient Interview:  Santos was meet in the milieu. He has been doing fine. He mentions to be eating and sleeping good. 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 He does not report any concerns at this time but he inquired about a  worker or supplies to the team. We could not give him an answer regarding that.     ROS:  See above     Objective:     Vitals:  BP (!) 155/80 (BP Location: Left arm)   Pulse 58   Temp 97.1  F (36.2  C) (Temporal)   Resp 18   Wt 113 kg (249 lb 1.6 oz)   SpO2 96%   BMI 40.21 kg/m      Allergies:  No Known Allergies    Current Medications:  Scheduled:  Current Facility-Administered Medications   Medication Dose  Route Frequency Provider Last Rate Last Admin    acetaminophen (TYLENOL) tablet 650 mg  650 mg Oral Q4H PRN Nikki Caceres MD   650 mg at 01/05/25 0432    alum & mag hydroxide-simethicone (MAALOX) suspension 30 mL  30 mL Oral Q4H PRN Nikki Caceres MD   30 mL at 10/17/24 0837    amLODIPine (NORVASC) tablet 10 mg  10 mg Oral Daily Presley Barrera MD   10 mg at 01/07/25 0744    atorvastatin (LIPITOR) tablet 40 mg  40 mg Oral Daily Nikki Caceres MD   40 mg at 01/07/25 0744    cholecalciferol (VITAMIN D3) capsule 1,250 mcg  1,250 mcg Oral Q7 Days Domingo GrimmjiDO   1,250 mcg at 12/31/24 1426    cinacalcet (SENSIPAR) tablet 30 mg  30 mg Oral Daily Presley Barrera MD   30 mg at 01/07/25 0744    cloNIDine (CATAPRES) tablet 0.1 mg  0.1 mg Oral BID Presley Barrera MD   0.1 mg at 01/07/25 0744    diclofenac (VOLTAREN) 1 % topical gel 2 g  2 g Topical 4x Daily PRN Rocío Lopez MD   2 g at 12/22/24 2032    furosemide (LASIX) tablet 40 mg  40 mg Oral Daily Presley Barrera MD   40 mg at 01/07/25 0744    gabapentin (NEURONTIN) capsule 100 mg  100 mg Oral Q6H PRN Nikki Caceres MD   100 mg at 12/24/24 0046    hydrocortisone (CORTAID) 0.5 % cream   Topical Daily PRN Savi Chamorro MD        Lidocaine (LIDOCARE) 4 % Patch 1 patch  1 patch Transdermal Q24H PRN Rocío Lopez MD   1 patch at 12/22/24 2023    lisinopril (ZESTRIL) tablet 40 mg  40 mg Oral Daily Presley Barrera MD   40 mg at 01/07/25 0744    melatonin tablet 3 mg  3 mg Oral At Bedtime Presley Barrera MD   3 mg at 01/06/25 2008    metoprolol succinate ER (TOPROL XL) 24 hr tablet 50 mg  50 mg Oral Daily Presley Barrera MD   50 mg at 01/07/25 0744    nicotine (NICODERM CQ) 14 MG/24HR 24 hr patch 1 patch  1 patch Transdermal Daily Evelia Grimm DO   1 patch at 01/07/25 0750    nicotine (NICORETTE) gum 2 mg  2 mg Buccal Q1H PRN González Garcia MD   2 mg at 10/24/24 1254    OLANZapine zydis (zyPREXA) ODT tab  5 mg  5 mg Oral At Bedtime González Garcia MD   5 mg at 01/06/25 2008    Or    OLANZapine (zyPREXA) injection 10 mg  10 mg Intramuscular At Bedtime González Garcia MD        OLANZapine (zyPREXA) tablet 5 mg  5 mg Oral TID PRN vEelia Grimm DO   5 mg at 01/05/25 1645    Or    OLANZapine (zyPREXA) injection 5 mg  5 mg Intramuscular TID PRN Evelia Grimm DO        polyethylene glycol (MIRALAX) Packet 17 g  17 g Oral Daily PRN Nikki Caceres MD        traZODone (DESYREL) half-tab 25 mg  25 mg Oral At Bedtime Rocío Lopez MD   25 mg at 01/06/25 2008    traZODone (DESYREL) half-tab 25 mg  25 mg Oral At Bedtime PRN Evelia Grimm DO   25 mg at 12/24/24 0046       PRN:  Current Facility-Administered Medications   Medication Dose Route Frequency Provider Last Rate Last Admin    acetaminophen (TYLENOL) tablet 650 mg  650 mg Oral Q4H PRN Nikki Caceres MD   650 mg at 01/05/25 0432    alum & mag hydroxide-simethicone (MAALOX) suspension 30 mL  30 mL Oral Q4H PRN Nikki Caceres MD   30 mL at 10/17/24 0837    amLODIPine (NORVASC) tablet 10 mg  10 mg Oral Daily Presley Barrera MD   10 mg at 01/07/25 0744    atorvastatin (LIPITOR) tablet 40 mg  40 mg Oral Daily Nikki Caceres MD   40 mg at 01/07/25 0744    cholecalciferol (VITAMIN D3) capsule 1,250 mcg  1,250 mcg Oral Q7 Days Evelia Grimm DO   1,250 mcg at 12/31/24 1426    cinacalcet (SENSIPAR) tablet 30 mg  30 mg Oral Daily Presley Barrera MD   30 mg at 01/07/25 0744    cloNIDine (CATAPRES) tablet 0.1 mg  0.1 mg Oral BID Presley Barrera MD   0.1 mg at 01/07/25 0744    diclofenac (VOLTAREN) 1 % topical gel 2 g  2 g Topical 4x Daily PRN Rocío Lopez MD   2 g at 12/22/24 2032    furosemide (LASIX) tablet 40 mg  40 mg Oral Daily Presley Barrera MD   40 mg at 01/07/25 0744    gabapentin (NEURONTIN) capsule 100 mg  100 mg Oral Q6H PRN Nikki Caceres MD   100 mg at 12/24/24 0046    hydrocortisone (CORTAID) 0.5 % cream    Topical Daily PRN Savi Chamorro MD        Lidocaine (LIDOCARE) 4 % Patch 1 patch  1 patch Transdermal Q24H PRN Rocío Lopez MD   1 patch at 12/22/24 2023    lisinopril (ZESTRIL) tablet 40 mg  40 mg Oral Daily Presley Barrera MD   40 mg at 01/07/25 0744    melatonin tablet 3 mg  3 mg Oral At Bedtime Presley Barrera MD   3 mg at 01/06/25 2008    metoprolol succinate ER (TOPROL XL) 24 hr tablet 50 mg  50 mg Oral Daily Presley Barrera MD   50 mg at 01/07/25 0744    nicotine (NICODERM CQ) 14 MG/24HR 24 hr patch 1 patch  1 patch Transdermal Daily Evelia Grimm DO   1 patch at 01/07/25 0750    nicotine (NICORETTE) gum 2 mg  2 mg Buccal Q1H PRN González Garcia MD   2 mg at 10/24/24 1254    OLANZapine zydis (zyPREXA) ODT tab 5 mg  5 mg Oral At Bedtime González Garcia MD   5 mg at 01/06/25 2008    Or    OLANZapine (zyPREXA) injection 10 mg  10 mg Intramuscular At Bedtime González Garcia MD        OLANZapine (zyPREXA) tablet 5 mg  5 mg Oral TID PRN Evelia Grimm DO   5 mg at 01/05/25 1645    Or    OLANZapine (zyPREXA) injection 5 mg  5 mg Intramuscular TID PRN Evelia Grimm DO        polyethylene glycol (MIRALAX) Packet 17 g  17 g Oral Daily PRN Nikki Caceres MD        traZODone (DESYREL) half-tab 25 mg  25 mg Oral At Bedtime Rocío Lopez MD   25 mg at 01/06/25 2008    traZODone (DESYREL) half-tab 25 mg  25 mg Oral At Bedtime PRN Evelia Grimm DO   25 mg at 12/24/24 0046     Labs and Imaging:  Data this admission:  - CBC unremarkable  - CMP unremarkable  - TSH normal  - UDS negative  - Vit D low  - Hgb A1c 5.9 (10/13/24)  - Lipids unremarkable  - Vit B12 normal  - Folate normal  - Urinalysis unremarkable  - EKG normal sinus rhythm, QTc 390 ms  - Head CT showed no acute changes  - HIV non reactive  - Treponema antibody non reactive  - ESR wnl   - Ceruloplasmin wnl   - BOOGIE negative  - Lyme negative      Parathyroid hormone:   10/13: 85     Calcium:  10/14: 11.4  10/16:  "10.3  10/17: 10.3  10/19: 9.8  10/21: 10.6  10/23: 10.3     Albumin:  10/14: 4.3  10/16: 4.3  10/17: 4.1  10/19: 4.2  10/21: 4.5  10/23: 4.3      CXR:   Impression:   1. No definite radiographic evidence of asbestosis. If clinically  indicated, consider evaluation with high resolution chest CT.  2. Nonspecific left costophrenic blunting. Given symmetrically low  lung volumes may be related to poor inspiratory effort/timing.  3. Pulmonary vascular congestion.     MRI:   IMPRESSION:  1. No acute intracranial pathology.  2. No focal lesion or structural abnormality.   3. Symmetric frontoparietal cortical volume loss slightly more than  expected for age.     Mental Status Exam:   Oriented to: Oriented to self only  General:  Awake and Alert  Appearance:  appears stated age, Grooming is adequate, and Dressed in his own clothes  Behavior/Attitude:  Disengaged, Easy to redirect, and Easily distracted  Eye Contact: Downcast distracted  Psychomotor: No evidence of tics, dystonia, or tardive dyskinesia  no catatonia present  Speech:  appropriate volume/tone, talkative, and spontaneous , apraxia of speech  Language: Fluent in English with appropriate syntax and vocabulary.  Mood:  \"good\"  Affect:  labile, blunted  Thought Process:  perseverative, tangential, and confused  Thought Content:    delusion of confusion . Patient denies paranoia  Associations:  loose  Insight:  impaired   Judgment:  impaired   Impulse control: limited  Attention Span:   mildly decreased  Concentration:   distractible  Recent and Remote Memory:   remote memory intact, recent memory impaired  Fund of Knowledge: average  Muscle Strength and Tone: normal  Gait and Station: Normal     Psychiatric Assessment     Estevan Aaron is a 64 year old male with previous psychiatric diagnoses of GEORGINA admitted from the ER on 10/12/2024 due to concern for HI and psychosis in the context of medical issues (hyperthyroidism, hypercalcemia), psychosocial stressors " including with recent divorce and selling his home. This is the patient's first psychiatric hospitalization. The MSE on admission was pertinent for a thought process which was perseverative, circumstantial, tangential, disorganized and tangential; with rambling and looseness of associations. Psychological contributions to presentation included lack of insight. Social factors contributing to presentation included isolation, recent divorce, selling his house, and moving from hotel to hotel. Biological contributions to presentation included a history of hyperparathyroidism with chronic hypercalcemia per charts as well as a history of methamphetamine use per collateral from ex-wife.     Per collateral from ex-wife, Santos has had paranoid ideations since they first met. He has always felt like people are out to get him or trying to rip him off. She says that he has had visual hallucinations (he will point things out that the rest of the family can't see) for a long time, but the family would just go along with him to avoid making him angry. This timeline and presentation could be consistent with diagnosis of paranoid personality disorder. Things began to worsen around the beginning of the COVID pandemic, when Santos became more isolated and the family started to notice cognitive issues such as impaired memory. Other things that could be contributing to his presentation is hyperparathyroidism with hypercalcemia. However, patient's calcium returned to normal limits on 10/16, and patient continues to have disorganized behavior unrelated to calcium elevation, so likely this is not a significant contributing factor to patient's presentation.      Currently, Santos is at times disoriented, but easily re-directable. Presents with some difficulty of word articulation, which contributes to his frustration when interacting with psych team, as he finds it difficult to explain himself. He is able to take care of his ADLs without much  assistance and he is mostly independent in the unit. On the other hand, his confused behavior tend to increase in the evenings, seemingly related to  Neurocognitive Disorder diagnosis, and this could evolved to be his new baseline. Either way, Santos does not present as a danger to himself or other so his SIO was discontinued. Santos does not present with any new episodes of physical agitation and is able to attend groups and interact with peers without major altercations. Patient currently is stable with current neuroleptic dose, patient probably wont benefit from any modification in current therapy.     Family care meeting 01/02 with psych team: update on current availability of placement options and Santos's current presentation. Family aware of Santos's level of functionality with ADLs, besides  disorientation episodes at evenings, that only requires minimal intervention for redirection. Still pending visits to possible memory care placements by family.    Santos appeared more confused throughout the day, different from his usual baseline, per staff report. No changed in vital signs but concern for acute delirium. UA was requested.    Goal of treatment:  Procure a memory care placement.        Psychiatric Plan by Diagnosis   Today's changes:  - UA ordered    # Major Neurocognitive Disorder  1. Medications:  - Olanzapine 5 mg at bedtime  - Neurology consult 11/8/24, recommend outpatient follow-up and neuropsychiatric testing     2. Pertinent Labs/Monitoring:   - Re-eval Calcium parameters tomorrow     3. Additional Plans:  - Patient will be treated in therapeutic milieu with appropriate individual and group therapies as described  - Patient is Commitment with [facility]  - [facility] meds: Haldol, Clozaril, Risperdal, Invega, Zyprexa, Seroquel, Abilify  - 12/12/24: Psych team had phone call meeting with Memory Care facility personnel  - Pending responses primary from [facility] and [facility]  - [facility]  Assessment needs to be rescheduled (only valid for 60 days, so ideally scheduled for after pt discharges and has an opportunity to apply for MA)   - Care meeting on 01/02     # Unspecified anxiety vs Generalized Anxiety Disorder  - Monitor for symptoms.  - Fluoxetine held due to suspicion of ongoing manic symptoms     Psychiatric Hospital Course:      Estevan Aaron was admitted to [facility] on a 72 hour hold.   Medications:  PTA fluoxetine was held due to concern for worsening of zelalem   New medications started at the time of admission include Zyprexa.   Increased olanzapine 10 mg at bedtime was to 10 mg BID (10/14)   Increased olanzapine 10 mg BID to 10 mg during day and 15 mg at bedtime (10/15)  10/21: increased olanzapine pm dose from 15 to 20 mg  10/23: started melatonin 3 mg at 7 pm to help patient with circadian rhythm as he has been staying up throughout the night and sleeping a lot during the day and there is some concern for delirium   10/24: consulted anesthesia for MRI brain   10/28: MRI brain complete, decrease AM olanzapine from 10 to 5 mg  10/29: Morning olanzapine decreased to 2.5 mg, evening olanzapine decreased to 15 mg   11/1: Decreased evening olanzapine to 10 mg   11/4: Decreased evening olanzapine to 7.5 mg   11/5: Decreased evening olanzapine to 5 mg   11/11: Moved morning dose of olanzapine to the afternoon as patient appears more agitated in the afternoons/evenings  11/21: Started memantine 5 mg daily   11/26: Discontinued olanzapine 2.5 mg during the afternoon  12/2:   Discontinued memantine 5 mg, daily  12/30: Discontinued SIO     Care conference 10/18/24 with daughter Maria C and ex-wife Clifford  - Report that patient has always been paranoid since ex-wife first met him. She describes him always feeling like he is getting ripped off.   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 They reported once incidence in which he made a bid on a job and the client paid him, but he never ended up finishing the job  - Wife and him  officially this year, and since selling the house several months ago, patient has been moving from hotel to hotel due to thinking people are out to get him   - When they were selling their house, patient threatened realtors and felt he was being ripped off   - Clifford reports that she started to be afraid to be around him alone  - When he was found in the hotel, he had a generator full of gasoline, binoculars, bullets, and hunting equipment.     10/21/24: updated daughter on Santos's treatment plan      10/23/24: updated daughter on Santos's treatment plan      10/25/24: updated daughter on Santos's treatment plan, including plan to try and get MRI brain done under sedation. She said that Santos's grandfather had dementia and his sister has a brain tumor.      10/28/24: updated daughter on Santos's MRI results. She is planning on coming into town soon.      Care conference 11/1/24 with daughters Maria C and Estefani and ex-wife Clifford  - Discussed dementia diagnosis   - Clifford asked about plans moving forward. Explained that applying for medical assistance is the first step. Explained that application processing time can be very variable. Informed family that Santos will most likely be staying on this inpatient unit during this time.   - Clifford expressed that their family would like to be the power of /have conservatorship for Santos.   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 Reports that he has bills that  he has not paid.   - Maria C is planning to move to [place], and Clifford currently lives in [place]. Family prefer that Santos would in a facility that are near these locations. Discussed with family that they can also try to request a specific location (memory care).   - Clifford reports that he had previously gotten help applying for his social security and medicare, but unsure if it had been approved.   - Informed family that there is a geriatric unit and there might be a transfer if there becomes availability on this unit.   - Family shared that he was completely different just two months ago.   - Estefani shared that his family found his medications in his old truck recently, expressed that it was likely that he was not taking his medications prior to his hospitalization.      11/6/24: updated Maria C on plan to try and transfer Santos to Geriatric unit.      11/11/24: updated Maria C on neurology consult      The risks, benefits, alternatives, and side effects were discussed and understood by the patient and other caregivers.     Medical Assessment and Plan     Medical diagnoses to be addressed this admission:    # Hip Pain  - New right hip pain, and mild pain in multiple joints. Moderate response to topical antiinflammatory gel and lidocaine patch.   - Medicine consulted for recommendations 12/20    # CHF  # Hypertension  - Continue PTA medications  Furosemide 40 mg daily  Lisinopril 40 mg daily  Metoprolol 50 mg daily  Amlodipine 10 mg daily  Clonidine 0.1 mg BID  - Pitting edema in lower extremities, not painful 3+: Medicine consulted for recommendations 12/20  - Weight gain of about 8 pounds in about 2 months currently 249lb as of 01/07/25    # Hyperlipidemia  - Continue PTA Atorvastatin 40 mg     # Primary Hyperparathyroidism  # Hypercalcemia, hypophosphoremia   Increased Ca level to 10.9 and decreased Ph level to 2.3 in the ED. Suspicion patient's hypercalcemia could be contributing to symptoms of psychosis.  -  Consulted endocrinology, who started cinacalcet 30 mg BID on 10/13  - 10/16 endocrinology recommended continuing to trend calcium and albumin to make sure patient does not become hypocalcemic and recommended holding cinacalcet   - 10/17, calcium and albumin wnl, endo recommended cinacalcet 30 mg once daily   - 10/21, per endo continue cincaclcet and recheck calcium and albumin on October 24  - 10/23: per endo, patient needs to follow up outpatient for further management of hyperparathyroidism      Medical course: Patient was physically examined by the ED prior to being transferred to the unit and was found to be medically stable and appropriate for admission.      Consults: Psychiatry, Endocrinology (follow-up of hyperthyroidism / hypercalcemia and hypertension), neurology     Checklist     Legal Status: Committed   MI Commitment with [facility]  File Number: [id number]  Start and expiration date of commitment: 10/24/24 - 04/24/25     [facility] meds: Haldol, Clozaril, Risperdal, Invega, Zyprexa, Seroquel, Abilify     PPS/CM:  Shelby Chowdary: [phone]  [email or web address].    Guardian/Conservator:  Clifford Aaron is currently emergency until 01/20/2025.       Safety Assessment:   Behavioral Orders   Procedures    Code 1 - Restrict to Unit    Routine Programming     As clinically indicated    Status 15     Every 15 minutes.       Risk Assessment:  Risk for harm is low  Risk factors: impulsive and past behaviors  Protective factors: family      SIO: No    Disposition: Pending stabilization, medication optimization, & development of a safe discharge plan.     Attestations     This patient was seen and discussed with my attending physician.  Rocío Orellana MD  [facility] Psychiatry Resident  01/07/2025    Attestation:  This patient has been seen and evaluated by me, Pete Smith MD.  I have discussed this patient with the house staff team including the resident and/or medical student  and I agree with the findings and plan in this note.    I have reviewed today's vital signs, medications, labs and imaging. Pete Smith MD , PhD.

## 2025-01-07 NOTE — PLAN OF CARE
Team Note Due:  Monday    Assessment/Intervention/Current Symtoms and Care Coordination:  Chart review and met with team, discussed pt progress, symptomology, and response to treatment.  Discussed the discharge plan and any potential impediments to discharge. Clifford Aaron is currently emergency guardian/conservator until 01/20/2025.    Clifford sent update that [facility] now will not accept someone on an EW as they do not have a capacity at the moment (pt could move forward if they could private pay for 1-2 years but that does not appear to be financially feasible). Clifford is touring another facility tomorrow she hopes to move forward with.    Will contact Ka after Clifford's facility tour tomorrow to potentially reschedule MnCHOICE assessment.    Discharge Plan or Goal:  Memory care facility     Barriers to Discharge:  Patient requires further psychiatric stabilization due to current symptomology, medication management with changes subject to provider, coordination with outside supports, and aftercare planning. Pt is under civil commitment.     Referral Status:    Memory Care facilities currently in process:  [facility]. Tour completed 11/27.  [facility]. Tour completed 11/29.  [facility]. Tour completed 11/30. Willing to review records if family wants to move forward.  [facility]. Tour scheduled 1/8/25. Have immediate openings and will accept EW.    Memory Care facilities pending family review:  [facility].  [facility].  [facility].  [facility].  [facility]    Memory Care facilities declined:  [facility] (private pay only, no EW).  [facility] [address] (private pay only, no EW).  [facility] (no openings).  [facility]. Tour completed 11/29. Records sent 12/2/24. Declined 12/19/24 (don't  feel they are an appropriate facility for pt's needs).  Lorraine (): [email or web address]; [phone]  Isatu (director of nursing): [email or web address]  [facility].  Denied 12/26 (concerned about dementia with psychosis, history of hallucinations, high elopement risk, still on 1:1).  Nikki Noel: [email or web address]; Office [phone], Fax [phone]   [facility]. Not accepting EW as of 1/7/25.    Legal Status:  MI Commitment with [facility]  File Number: [id number]  Start and expiration date of commitment: 10/24/24 - 04/24/25    [facility] meds: Haldol, Clozaril, Risperdal, Invega, Zyprexa, Seroquel, Abilify    PPS/CM:  Shelby Chowdary: [phone]  [email or web address].    Contacts:  Maria C Aaron (Daughter): [phone]   Clifford Aaron (ex-wife): [phone]     No Del Angel (guardianship/conservatorship ): [phone]  [email or web address]    Derrell Duff (MnCHOICE ): [phone]  [email or web address]     Upcoming Meetings and Dates/Important Information and next steps:  MnCHOICE Assessment Friday 1/10/25 at 10am. May need to reschedule.  Follow up with family regarding list of Memory Care facilities  Discharge planning when appropriate  Pt does not qualify for MA per financial counselor on 11/8/2024. Pt will likely privately pay for Memory Care until he qualifies for MA/waivers in the future.    Provisional Discharge and Change of Status needed at discharge

## 2025-01-07 NOTE — PLAN OF CARE
BEH IP Unit Acuity Rating Score (UARS)  Patient is given one point for every criteria they meet.    CRITERIA SCORING   On a 72 hour hold, court hold, committed, stay of commitment, or revocation. 1    Patient LOS on BEH unit exceeds 20 days. 1  LOS: 87   Patient under guardianship, 55+, otherwise medically complex, or under age 11. 1   Suicide ideation without relief of precipitating factors. 0   Current plan for suicide. 0   Current plan for homicide. 0   Imminent risk or actual attempt to seriously harm another without relief of factors precipitating the attempt. 1   Severe dysfunction in daily living (ex: complete neglect for self care, extreme disruption in vegetative function, extreme deterioration in social interactions). 1   Recent (last 7 days) or current physical aggression in the ED or on unit. 0   Restraints or seclusion episode in past 72 hours. 0   Recent (last 7 days) or current verbal aggression, agitation, yelling, etc., while in the ED or unit. 0   Active psychosis. 1   Need for constant or near constant redirection (from leaving, from others, etc).  0   Intrusive or disruptive behaviors. 1   Patient requires 3 or more hours of individualized nursing care per 8-hour shift (i.e. for ADLs, meds, therapeutic interventions). 1   TOTAL 8

## 2025-01-07 NOTE — PLAN OF CARE
Problem: Adult Behavioral Health Plan of Care  Goal: Plan of Care Review  Outcome: Progressing  Flowsheets  Taken 1/6/2025 2013  Plan of Care Reviewed With: patient  Overall Patient Progress: improving  Patient Agreement with Plan of Care: agrees  Taken 1/6/2025 1725  Patient Agreement with Plan of Care: agrees     Problem: Pain Acute  Goal: Optimal Pain Control and Function  Outcome: Progressing     Problem: Manic or Hypomanic Signs/Symptoms  Goal: Improved Mood Symptoms (Manic/Hypomanic Signs/Symptoms)  Intervention: Optimize Emotion and Mood  Recent Flowsheet Documentation  Taken 1/6/2025 1725 by Torito Cruz RN  Diversional Activity: television   Goal Outcome Evaluation:    Plan of Care Reviewed With: patient Plan of Care Reviewed With: patient    Overall Patient Progress: improvingOverall Patient Progress: improving     Alert and able to communicate needs, intermittently confused. Patient was visible in milieu all shift, he was social and interactive with peers and staff members. Labile mood, argumentative with staff members at times, overall cooperative and medication compliant. He denied any pain or discomfort, appeared in no distress, ADLs WNL. He denied any anxiety or depression, denied suicidal thoughts.

## 2025-01-07 NOTE — PLAN OF CARE
"Pt denies SI.  At beginning of shift pt was standing in front of front door was looking out windows.  Pt was encouraged to sit in lounge.  Instead he followed this staff to OT room and knocking on windows just few times.  Pt was talking about what kind of purchases was made.  Pt seemed confused.  At lunch pt sitting at chair at coffee table.  He upset b/c he thought another pt burped.  Pt was sitting across form this pt.  Pt was upset because his tray was there.  Pt was able to deescalate with interventions. Pt has been sleeping off and on the in the lounge.    Problem: Adult Behavioral Health Plan of Care  Goal: Plan of Care Review  Outcome: Not Progressing  Goal: Patient-Specific Goal (Individualization)  Description: You can add care plan individualizations to a care plan. Examples of Individualization might be:  \"Parent requests to be called daily at 9am for status\", \"I have a hard time hearing out of my right ear\", or \"Do not touch me to wake me up as it startles  me\".  Outcome: Not Progressing  Goal: Adheres to Safety Considerations for Self and Others  Outcome: Not Progressing  Intervention: Develop and Maintain Individualized Safety Plan  Recent Flowsheet Documentation  Taken 1/7/2025 1200 by Catherine Szymanski RN  Safety Measures:   environmental rounds completed   safety rounds completed  Intervention: Develop and Maintain Individualized Safety Plan  Recent Flowsheet Documentation  Taken 1/7/2025 1200 by Catherine Szymanski, RN  Safety Measures:   environmental rounds completed   safety rounds completed  Goal: Absence of New-Onset Illness or Injury  Outcome: Not Progressing  Intervention: Identify and Manage Fall Risk  Recent Flowsheet Documentation  Taken 1/7/2025 1200 by Catherine Szymanski RN  Safety Measures:   environmental rounds completed   safety rounds completed  Goal: Optimized Coping Skills in Response to Life Stressors  Outcome: Not Progressing  Goal: Develops/Participates in Therapeutic [place] to Support " Successful Transition  Outcome: Not Progressing     Problem: Psychotic Signs/Symptoms  Goal: Improved Behavioral Control (Psychotic Signs/Symptoms)  Outcome: Not Progressing  Goal: Optimal Cognitive Function (Psychotic Signs/Symptoms)  Outcome: Not Progressing  Goal: Increased Participation and Engagement (Psychotic Signs/Symptoms)  Outcome: Not Progressing  Goal: Improved Mood Symptoms (Psychotic Signs/Symptoms)  Outcome: Not Progressing  Goal: Improved Psychomotor Symptoms (Psychotic Signs/Symptoms)  Outcome: Not Progressing  Intervention: Manage Psychomotor Movement  Recent Flowsheet Documentation  Taken 1/7/2025 1200 by Catherine Szymanski RN  Activity (Behavioral Health): up ad diya  Goal: Decreased Sensory Symptoms (Psychotic Signs/Symptoms)  Outcome: Not Progressing  Goal: Improved Sleep (Psychotic Signs/Symptoms)  Outcome: Not Progressing  Goal: Enhanced Social, Occupational or Functional Skills (Psychotic Signs/Symptoms)  Outcome: Not Progressing     Problem: Depression  Goal: Improved Mood  Outcome: Not Progressing     Problem: Suicidal Behavior  Goal: Suicidal Behavior is Absent or Managed  Outcome: Not Progressing     Problem: Sleep Disturbance  Goal: Adequate Sleep/Rest  Outcome: Not Progressing     Problem: Seclusion/Restraint, Behavioral  Goal: Seclusion/Behavioral Restraint Goal: Absence of Harm or Injury  Outcome: Not Progressing  Intervention: Implement Least Restrictive Safety Strategies  Recent Flowsheet Documentation  Taken 1/7/2025 1200 by Catherine Szymanski, RN  Safety Measures:   environmental rounds completed   safety rounds completed     Problem: Pain Acute  Goal: Optimal Pain Control and Function  Outcome: Not Progressing     Problem: Manic or Hypomanic Signs/Symptoms  Goal: Improved Impulse Control (Manic/Hypomanic Signs/Symptoms)  Outcome: Not Progressing  Goal: Optimized Cognitive Function (Manic/Hypomanic Signs/Symptoms)  Outcome: Not Progressing  Goal: Improved Mood Symptoms (Manic/Hypomanic  Signs/Symptoms)  Outcome: Not Progressing  Goal: Optimized Nutrition Intake (Manic or Hypomanic Signs/Symptoms)  Outcome: Not Progressing  Goal: Improved Psychomotor Symptoms (Manic/Hypomanic Signs/Symptoms)  Outcome: Not Progressing  Intervention: Manage Psychomotor Movement  Recent Flowsheet Documentation  Taken 1/7/2025 1200 by Catherine Szymanski RN  Activity (Behavioral Health): up ad diya  Goal: Improved Sleep (Manic/Hypomanic Signs/Symptoms)  Outcome: Not Progressing  Goal: Enhanced Social, Occupational or Functional Skills (Manic/Hypomanic Signs/Symptoms)  Outcome: Not Progressing   Goal Outcome Evaluation:

## 2025-01-07 NOTE — PLAN OF CARE
Problem: Sleep Disturbance  Goal: Adequate Sleep/Rest  Outcome: Progressing   Goal Outcome Evaluation:    Pt has been sleeping most the both in the launch and in his room. Pt  denies pain or discomfort this shift. No PRNs given. Pt slept for about 7 hours. Continue to monitor and offer support.    Most Resent Vitals  Blood pressure (!) 143/74, pulse 65, temperature 97.2  F (36.2  C), temperature source Oral, resp. rate 18, weight 113 kg (249 lb 1.6 oz), SpO2 96%.

## 2025-01-08 LAB
ALBUMIN UR-MCNC: NEGATIVE MG/DL
APPEARANCE UR: CLEAR
BILIRUB UR QL STRIP: NEGATIVE
COLOR UR AUTO: ABNORMAL
GLUCOSE UR STRIP-MCNC: NEGATIVE MG/DL
HGB UR QL STRIP: NEGATIVE
KETONES UR STRIP-MCNC: NEGATIVE MG/DL
LEUKOCYTE ESTERASE UR QL STRIP: NEGATIVE
MUCOUS THREADS #/AREA URNS LPF: PRESENT /LPF
NITRATE UR QL: NEGATIVE
PH UR STRIP: 6 [PH] (ref 5–7)
RBC URINE: <1 /HPF
SP GR UR STRIP: 1.01 (ref 1–1.03)
UROBILINOGEN UR STRIP-MCNC: NORMAL MG/DL
WBC URINE: 1 /HPF

## 2025-01-08 PROCEDURE — 250N000013 HC RX MED GY IP 250 OP 250 PS 637

## 2025-01-08 PROCEDURE — 99231 SBSQ HOSP IP/OBS SF/LOW 25: CPT | Mod: GC | Performed by: PSYCHIATRY & NEUROLOGY

## 2025-01-08 PROCEDURE — 81001 URINALYSIS AUTO W/SCOPE: CPT

## 2025-01-08 PROCEDURE — 124N000002 HC R&B MH UMMC

## 2025-01-08 RX ORDER — AMOXICILLIN 250 MG
1 CAPSULE ORAL AT BEDTIME
Status: DISCONTINUED | OUTPATIENT
Start: 2025-01-08 | End: 2025-01-08

## 2025-01-08 RX ORDER — AMOXICILLIN 250 MG
1 CAPSULE ORAL DAILY
Status: DISPENSED | OUTPATIENT
Start: 2025-01-08

## 2025-01-08 RX ADMIN — SENNOSIDES AND DOCUSATE SODIUM 1 TABLET: 50; 8.6 TABLET ORAL at 09:51

## 2025-01-08 RX ADMIN — OLANZAPINE 5 MG: 5 TABLET, ORALLY DISINTEGRATING ORAL at 20:16

## 2025-01-08 RX ADMIN — ATORVASTATIN CALCIUM 40 MG: 20 TABLET, FILM COATED ORAL at 09:03

## 2025-01-08 RX ADMIN — FUROSEMIDE 40 MG: 40 TABLET ORAL at 09:03

## 2025-01-08 RX ADMIN — MELATONIN TAB 3 MG 3 MG: 3 TAB at 20:16

## 2025-01-08 RX ADMIN — CLONIDINE HYDROCHLORIDE 0.1 MG: 0.1 TABLET ORAL at 20:17

## 2025-01-08 RX ADMIN — LISINOPRIL 40 MG: 10 TABLET ORAL at 09:03

## 2025-01-08 RX ADMIN — METOPROLOL SUCCINATE 50 MG: 50 TABLET, EXTENDED RELEASE ORAL at 09:03

## 2025-01-08 RX ADMIN — CINACALCET 30 MG: 30 TABLET ORAL at 09:02

## 2025-01-08 RX ADMIN — Medication 25 MG: at 20:16

## 2025-01-08 RX ADMIN — AMLODIPINE BESYLATE 10 MG: 5 TABLET ORAL at 09:02

## 2025-01-08 RX ADMIN — CLONIDINE HYDROCHLORIDE 0.1 MG: 0.1 TABLET ORAL at 09:03

## 2025-01-08 RX ADMIN — Medication 1 PATCH: at 09:09

## 2025-01-08 ASSESSMENT — ACTIVITIES OF DAILY LIVING (ADL)
LAUNDRY: UNABLE TO COMPLETE
ADLS_ACUITY_SCORE: 66
HYGIENE/GROOMING: INDEPENDENT
HYGIENE/GROOMING: INDEPENDENT
ADLS_ACUITY_SCORE: 66
DRESS: STREET CLOTHES;INDEPENDENT
ADLS_ACUITY_SCORE: 66
ORAL_HYGIENE: INDEPENDENT
ADLS_ACUITY_SCORE: 66
DRESS: INDEPENDENT
ADLS_ACUITY_SCORE: 66
ORAL_HYGIENE: INDEPENDENT
ADLS_ACUITY_SCORE: 66
ADLS_ACUITY_SCORE: 66
LAUNDRY: UNABLE TO COMPLETE

## 2025-01-08 NOTE — PLAN OF CARE
Team Note Due:  Monday    Assessment/Intervention/Current Symtoms and Care Coordination:  Chart review and met with team, discussed pt progress, symptomology, and response to treatment.  Discussed the discharge plan and any potential impediments to discharge. Clifford Aaron is currently emergency guardian/conservator until 01/20/2025.    Clifford would like to move forward with [facility]. She is visiting [facility] and will let writer know if any records are needed for a referral after the visit. Given 2 possible placement options, Clifford is requesting to keep MnCHOICE assessment on Friday.    Records faxed to Lobo Tamez at [facility].    Discharge Plan or Goal:  Memory care facility     Barriers to Discharge:  Patient requires further psychiatric stabilization due to current symptomology, medication management with changes subject to provider, coordination with outside supports, and aftercare planning. Pt is under civil commitment.     Referral Status:    Memory Care facilities currently in process:  [facility]. Tour completed 11/27.  [facility]. Tour completed 11/29.  [facility]. Tour completed 11/30. Per Clifford on 1/8, she would like to move forward with a referral. Records sent 1/8.  [facility]. Tour scheduled 1/8/25. Have immediate openings and will accept EW.    Memory Care facilities pending family review:  [facility].  [facility].  [facility].  [facility].  [facility].    Memory Care facilities declined:  [facility] (private pay only, no EW).  [facility]private pay only, no EW).  [facility] (no openings).  [facility]. Tour completed 11/29. Records sent 12/2/24. Declined 12/19/24 (don't feel they are an appropriate facility for pt's needs).  Lorraine (marketing  director): [email or web address]; [phone]  Isatu (director of nursing): [email or web address]  [facility].  Denied 12/26 (concerned about dementia with psychosis, history of hallucinations, high elopement risk, still on 1:1).  Nikki Noel: [email or web address]; Office [phone], Fax [phone]   [facility]. Not accepting EW as of 1/7/25.    Legal Status:  [facility] Commitment with [facility]  File Number: [id number]  Start and expiration date of commitment: 10/24/24 - 04/24/25    [facility] meds: Haldol, Clozaril, Risperdal, Invega, Zyprexa, Seroquel, Abilify    PPS/CM:  Shelby Chowdary: [phone]  [email or web address].    Contacts:  Maria C Aaron (Daughter): [phone]   Clifford Agnieszka (ex-wife): [phone]     No Del Angel (guardianship/conservatorship ): [phone]  [email or web address]    Derrell Duff (MnCHOICE ): [phone]  [email or web address]     Upcoming Meetings and Dates/Important Information and next steps:  MnCHOICE Assessment Friday 1/10/25 at 10am  Follow up with family regarding list of Memory Care facilities  Discharge planning when appropriate  Pt does not qualify for MA per financial counselor on 11/8/2024. Pt will likely privately pay for Memory Care until he qualifies for MA/waivers in the future.    Provisional Discharge and Change of Status needed at discharge

## 2025-01-08 NOTE — PLAN OF CARE
"  Problem: Adult Behavioral Health Plan of Care  Goal: Plan of Care Review  Outcome: Progressing  Flowsheets  Taken 1/8/2025 1447  Plan of Care Reviewed With: patient  Taken 1/8/2025 1000  Patient Agreement with Plan of Care: agrees  Goal: Patient-Specific Goal (Individualization)  Description: You can add care plan individualizations to a care plan. Examples of Individualization might be:  \"Parent requests to be called daily at 9am for status\", \"I have a hard time hearing out of my right ear\", or \"Do not touch me to wake me up as it startles  me\".  Outcome: Progressing  Goal: Adheres to Safety Considerations for Self and Others  Outcome: Progressing  Intervention: Develop and Maintain Individualized Safety Plan  Recent Flowsheet Documentation  Taken 1/8/2025 1000 by Vera Graham RN  Safety Measures:   environmental rounds completed   safety rounds completed  Intervention: Develop and Maintain Individualized Safety Plan  Recent Flowsheet Documentation  Taken 1/8/2025 1000 by Vera Graham RN  Safety Measures:   environmental rounds completed   safety rounds completed  Goal: Absence of New-Onset Illness or Injury  Outcome: Progressing  Intervention: Identify and Manage Fall Risk  Recent Flowsheet Documentation  Taken 1/8/2025 1000 by Vera Graham RN  Safety Measures:   environmental rounds completed   safety rounds completed  Intervention: Prevent VTE (Venous Thromboembolism)  Recent Flowsheet Documentation  Taken 1/8/2025 1000 by Vera Graham RN  VTE Prevention/Management: SCDs off (sequential compression devices)  Intervention: Prevent VTE (Venous Thromboembolism)  Recent Flowsheet Documentation  Taken 1/8/2025 1000 by Vera Graham RN  VTE Prevention/Management: SCDs off (sequential compression devices)  Goal: Optimized Coping Skills in Response to Life Stressors  Outcome: Progressing  Intervention: Promote Effective Coping Strategies  Recent Flowsheet Documentation  Taken " 1/8/2025 1000 by Vera Graham RN  Supportive Measures: active listening utilized  Intervention: Promote Effective Coping Strategies  Recent Flowsheet Documentation  Taken 1/8/2025 1000 by Vera Graham RN  Supportive Measures: active listening utilized  Goal: Develops/Participates in Therapeutic [place] to Support Successful Transition  Outcome: Progressing  Intervention: Foster Therapeutic Wingate  Recent Flowsheet Documentation  Taken 1/8/2025 1000 by Vera Graham RN  Trust Relationship/Rapport:   care explained   choices provided   emotional support provided   empathic listening provided   questions encouraged   questions answered   reassurance provided   thoughts/feelings acknowledged  Intervention: Foster Therapeutic Wingate  Recent Flowsheet Documentation  Taken 1/8/2025 1000 by Vera Graham RN  Trust Relationship/Rapport:   care explained   choices provided   emotional support provided   empathic listening provided   questions encouraged   questions answered   reassurance provided   thoughts/feelings acknowledged       Goal Outcome Evaluation:    Plan of Care Reviewed With: patient     Patient presented with a flat and blunted affect. His mood was calm. He denied pain. He denied all psych and MH symptoms and contracted for safety. Patient was medication compliant. His dressing is appropriate for the weather. His food and fluid intake is adequate. Patient was visible in the milieu socializing with select peers. Patient was doing his ADLs without prompts. He was in and out of his room sometimes. Patient was able to make his needs known. There was no behavioral escalations or safety concerns noted this shift. Will continue current plan of care and notify the provider of any changes.  BP (!) 153/75   Pulse 53   Temp 96.9  F (36.1  C) (Temporal)   Resp 18   Wt 113 kg (249 lb 1.6 oz)   SpO2 97%   BMI 40.21 kg/m

## 2025-01-08 NOTE — PROGRESS NOTES
"  ----------------------------------------------------------------------------------------------------------  [facility]  Psychiatry Progress Note  Hospital Day #88     Interim History:     The patient's care was discussed with the treatment team and chart notes were reviewed.    Identifier: Estevan Aaron is a 64 year old male with previous psychiatric diagnoses of  generalized anxiety disorder, Neurocognitive disorder, admitted from the ED 10/12/2024 due to concern for HI and psychosis in the context of medical issues (hyperthyroidism, hypercalcemia) psychosocial stressors including family dynamics with recent possible divorce.    Sleep: 3.75 hours (01/08/25 0600)  Scheduled medications: Took all scheduled medications as prescribed  Psychiatric PRN medications:     Staff Report:   Patient noted to be confused during the day time, intermittently irritable towards peers but redirectable.     See staff notes for more information     Subjective:     Patient Interview:  Santos was seen in his room. Starts the interview by saying \"I'm getting ready to fight you all.\" Expresses how he doesn't want to go to memory care and asks to be discharged to the community. When informed that the best disposition option is for him to go to Memory care facility, he remains frustrated. Then continues talking about how he called the  before he wad admitted, says that's the reason why he is admitted in the first place.   ROS:  See above     Objective:     Vitals:  /73 (BP Location: Left arm, Patient Position: Sitting)   Pulse 64   Temp 97.1  F (36.2  C) (Temporal)   Resp 18   Wt 113 kg (249 lb 1.6 oz)   SpO2 96%   BMI 40.21 kg/m      Allergies:  No Known Allergies    Current Medications:  Scheduled:  Current Facility-Administered Medications   Medication Dose Route Frequency Provider Last Rate Last Admin    acetaminophen (TYLENOL) tablet 650 mg  650 mg Oral Q4H PRN Nicki, " MD Nikki   650 mg at 01/05/25 0432    alum & mag hydroxide-simethicone (MAALOX) suspension 30 mL  30 mL Oral Q4H PRN Nikki Caceres MD   30 mL at 10/17/24 0837    amLODIPine (NORVASC) tablet 10 mg  10 mg Oral Daily Presley Barrera MD   10 mg at 01/07/25 0744    atorvastatin (LIPITOR) tablet 40 mg  40 mg Oral Daily Nikki Caceres MD   40 mg at 01/07/25 0744    cholecalciferol (VITAMIN D3) capsule 1,250 mcg  1,250 mcg Oral Q7 Days Evelia Grimm DO   1,250 mcg at 01/07/25 1300    cinacalcet (SENSIPAR) tablet 30 mg  30 mg Oral Daily Presley Barrera MD   30 mg at 01/07/25 0744    cloNIDine (CATAPRES) tablet 0.1 mg  0.1 mg Oral BID Presley Barrera MD   0.1 mg at 01/07/25 2017    diclofenac (VOLTAREN) 1 % topical gel 2 g  2 g Topical 4x Daily PRN Rocío Lopez MD   2 g at 12/22/24 2032    furosemide (LASIX) tablet 40 mg  40 mg Oral Daily Presley Barrera MD   40 mg at 01/07/25 0744    gabapentin (NEURONTIN) capsule 100 mg  100 mg Oral Q6H PRN Nikki Caceres MD   100 mg at 12/24/24 0046    hydrocortisone (CORTAID) 0.5 % cream   Topical Daily PRN Savi Chamorro MD        Lidocaine (LIDOCARE) 4 % Patch 1 patch  1 patch Transdermal Q24H PRN Rocío Lopez MD   1 patch at 12/22/24 2023    lisinopril (ZESTRIL) tablet 40 mg  40 mg Oral Daily Presley Barrera MD   40 mg at 01/07/25 0744    melatonin tablet 3 mg  3 mg Oral At Bedtime Presley Barrera MD   3 mg at 01/07/25 2017    metoprolol succinate ER (TOPROL XL) 24 hr tablet 50 mg  50 mg Oral Daily Presley Barrera MD   50 mg at 01/07/25 0744    nicotine (NICODERM CQ) 14 MG/24HR 24 hr patch 1 patch  1 patch Transdermal Daily Domingo Grimmji,    1 patch at 01/07/25 0750    nicotine (NICORETTE) gum 2 mg  2 mg Buccal Q1H PRN González Garcia MD   2 mg at 10/24/24 1254    OLANZapine zydis (zyPREXA) ODT tab 5 mg  5 mg Oral At Bedtime González Garcia MD   5 mg at 01/07/25 2017    Or    OLANZapine (zyPREXA) injection 10  mg  10 mg Intramuscular At Bedtime González Garcia MD        OLANZapine (zyPREXA) tablet 5 mg  5 mg Oral TID PRN Evelia Grimm DO   5 mg at 01/05/25 1645    Or    OLANZapine (zyPREXA) injection 5 mg  5 mg Intramuscular TID PRN Evelia Grimm DO        polyethylene glycol (MIRALAX) Packet 17 g  17 g Oral Daily PRN Nikki Caceres MD        traZODone (DESYREL) half-tab 25 mg  25 mg Oral At Bedtime Rocío Lopez MD   25 mg at 01/07/25 2016    traZODone (DESYREL) half-tab 25 mg  25 mg Oral At Bedtime PRN Evelia Grimm DO   25 mg at 12/24/24 0046       PRN:  Current Facility-Administered Medications   Medication Dose Route Frequency Provider Last Rate Last Admin    acetaminophen (TYLENOL) tablet 650 mg  650 mg Oral Q4H PRN Nikki Caceres MD   650 mg at 01/05/25 0432    alum & mag hydroxide-simethicone (MAALOX) suspension 30 mL  30 mL Oral Q4H PRN Nikki Caceres MD   30 mL at 10/17/24 0837    amLODIPine (NORVASC) tablet 10 mg  10 mg Oral Daily Presley Barrera MD   10 mg at 01/07/25 0744    atorvastatin (LIPITOR) tablet 40 mg  40 mg Oral Daily Nikki Caceres MD   40 mg at 01/07/25 0744    cholecalciferol (VITAMIN D3) capsule 1,250 mcg  1,250 mcg Oral Q7 Days Evelia Grimm DO   1,250 mcg at 01/07/25 1300    cinacalcet (SENSIPAR) tablet 30 mg  30 mg Oral Daily Presley Barrera MD   30 mg at 01/07/25 0744    cloNIDine (CATAPRES) tablet 0.1 mg  0.1 mg Oral BID Presley Barrera MD   0.1 mg at 01/07/25 2017    diclofenac (VOLTAREN) 1 % topical gel 2 g  2 g Topical 4x Daily PRN Rocío Lopez MD   2 g at 12/22/24 2032    furosemide (LASIX) tablet 40 mg  40 mg Oral Daily Presley Barrera MD   40 mg at 01/07/25 0744    gabapentin (NEURONTIN) capsule 100 mg  100 mg Oral Q6H PRN Nikki Caceres MD   100 mg at 12/24/24 0046    hydrocortisone (CORTAID) 0.5 % cream   Topical Daily PRN Savi Chamorro MD        Lidocaine (LIDOCARE) 4 % Patch 1 patch  1 patch Transdermal Q24H  PRN Rocío Lopez MD   1 patch at 12/22/24 2023    lisinopril (ZESTRIL) tablet 40 mg  40 mg Oral Daily Presley Barrera MD   40 mg at 01/07/25 0744    melatonin tablet 3 mg  3 mg Oral At Bedtime Presley Barrera MD   3 mg at 01/07/25 2017    metoprolol succinate ER (TOPROL XL) 24 hr tablet 50 mg  50 mg Oral Daily Presley Barrera MD   50 mg at 01/07/25 0744    nicotine (NICODERM CQ) 14 MG/24HR 24 hr patch 1 patch  1 patch Transdermal Daily Evelia Grimm DO   1 patch at 01/07/25 0750    nicotine (NICORETTE) gum 2 mg  2 mg Buccal Q1H PRN González Garcia MD   2 mg at 10/24/24 1254    OLANZapine zydis (zyPREXA) ODT tab 5 mg  5 mg Oral At Bedtime González Garcia MD   5 mg at 01/07/25 2017    Or    OLANZapine (zyPREXA) injection 10 mg  10 mg Intramuscular At Bedtime González Garcia MD        OLANZapine (zyPREXA) tablet 5 mg  5 mg Oral TID PRN Evelia Grimm DO   5 mg at 01/05/25 1645    Or    OLANZapine (zyPREXA) injection 5 mg  5 mg Intramuscular TID PRN Evelia Grimm DO        polyethylene glycol (MIRALAX) Packet 17 g  17 g Oral Daily PRN Nikki Caceres MD        traZODone (DESYREL) half-tab 25 mg  25 mg Oral At Bedtime Rocío Lopez MD   25 mg at 01/07/25 2016    traZODone (DESYREL) half-tab 25 mg  25 mg Oral At Bedtime PRN Evelia Grimm DO   25 mg at 12/24/24 0046     Labs and Imaging:  Data this admission:  - CBC unremarkable  - CMP unremarkable  - TSH normal  - UDS negative  - Vit D low  - Hgb A1c 5.9 (10/13/24)  - Lipids unremarkable  - Vit B12 normal  - Folate normal  - Urinalysis unremarkable  - EKG normal sinus rhythm, QTc 390 ms  - Head CT showed no acute changes  - HIV non reactive  - Treponema antibody non reactive  - ESR wnl   - Ceruloplasmin wnl   - BOOGIE negative  - Lyme negative      Parathyroid hormone:   85 (10/13)     Calcium:  11.4 (10/14) -> 10.3 (10/16) -> 9.8 (10/19) -> 10.6 (10/21) -> 10.3 (10/23)     Albumin:  4.3 (10/14) -->  4.3 (10/16) -> 4.1 (10/17)  "-> 4.2 (10/19) -> 4.5 (10/21) -> 4.3 (10/23)      Mental Status Exam:   Oriented to: Oriented to self only  General:  Awake and Alert  Appearance:  appears stated age, Grooming is adequate, and Dressed in his own clothes  Behavior/Attitude:  Disengaged, Easy to redirect, and Easily distracted  Eye Contact: Downcast distracted  Psychomotor: No evidence of tics, dystonia, or tardive dyskinesia  no catatonia present  Speech:  appropriate volume/tone, talkative, and spontaneous , apraxia of speech  Language: Fluent in English with appropriate syntax and vocabulary.  Mood:  \"good\"  Affect:  labile, blunted  Thought Process:  perseverative, tangential, and confused  Thought Content:    delusion of confusion . Patient denies paranoia  Associations:  loose  Insight:  impaired   Judgment:  impaired   Impulse control: limited  Attention Span:   mildly decreased  Concentration:   distractible  Recent and Remote Memory:   remote memory intact, recent memory impaired  Fund of Knowledge: average  Muscle Strength and Tone: normal  Gait and Station: Normal     Psychiatric Assessment     Estevan Aaron is a 64 year old male with previous psychiatric diagnoses of GEORGINA admitted from the ER on 10/12/2024 due to concern for HI and psychosis in the context of medical issues (hyperthyroidism, hypercalcemia), psychosocial stressors including with recent divorce and selling his home. This is the patient's first psychiatric hospitalization. The MSE on admission was pertinent for a thought process which was perseverative, circumstantial, tangential, disorganized and tangential; with rambling and looseness of associations. Psychological contributions to presentation included lack of insight. Social factors contributing to presentation included isolation, recent divorce, selling his house, and moving [place] [facility] [place]. Biological contributions to presentation included a history of hyperparathyroidism with chronic hypercalcemia per charts as " well as a history of methamphetamine use per collateral from ex-wife.     Per collateral from ex-wife, Santos has had paranoid ideations since they first met. He has always felt like people are out to get him or trying to rip him off. She says that he has had visual hallucinations (he will point things out that the rest of the family can't see) for a long time, but the family would just go along with him to avoid making him angry. This timeline and presentation could be consistent with diagnosis of paranoid personality disorder. Things began to worsen around the beginning of the COVID pandemic, when Santos became more isolated and the family started to notice cognitive issues such as impaired memory. Other things that could be contributing to his presentation is hyperparathyroidism with hypercalcemia. However, patient's calcium returned to normal limits on 10/16, and patient continues to have disorganized behavior unrelated to calcium elevation, so likely this is not a significant contributing factor to patient's presentation.      Currently, Santos is at times disoriented, but easily re-directable. Presents with some difficulty of word articulation, which contributes to his frustration when interacting with psych team, as he finds it difficult to explain himself. He is able to take care of his ADLs without much assistance and he is mostly independent in the unit. On the other hand, his confused behavior tend to increase in the evenings, seemingly related to  Neurocognitive Disorder diagnosis, and this could evolved to be his new baseline. Either way, Santos does not present as a danger to himself or other so his SIO was discontinued. Santos does not present with any new episodes of physical agitation and is able to attend groups and interact with peers without major altercations. Patient currently is stable with current neuroleptic dose, patient probably wont benefit from any modification in current therapy.     Family care  meeting 01/02 with psych team: update on current availability of placement options and Santos's current presentation. Family aware of Santos's level of functionality with ADLs, besides  disorientation episodes at evenings, that only requires minimal intervention for redirection. Still pending visits to possible memory care placements by family.    Santos appeared more confused throughout the day, different from his usual baseline, per staff report. No changed in vital signs but concern for acute delirium. UA was requested.    Goal of treatment:  Procure a memory care placement.      Psychiatric Plan by Diagnosis     Today's changes:  - No medication changes     # Major Neurocognitive Disorder  1. Medications:  - Olanzapine 5 mg at bedtime     3. Additional Plans:  - Patient will be treated in therapeutic milieu with appropriate individual and group therapies as described  - Pending memory facility placement.      # Unspecified anxiety vs Generalized Anxiety Disorder  - Monitor for symptoms.  - Fluoxetine held due to suspicion of ongoing manic symptoms     Psychiatric Hospital Course:      Estevan Aaron was admitted to [facility] on a 72 hour hold.   Medications:  PTA fluoxetine was held due to concern for worsening of zelalem   New medications started at the time of admission include Zyprexa.   Increased olanzapine 10 mg at bedtime was to 10 mg BID (10/14)   Increased olanzapine 10 mg BID to 10 mg during day and 15 mg at bedtime (10/15)  10/21: increased olanzapine pm dose from 15 to 20 mg  10/23: started melatonin 3 mg at 7 pm to help patient with circadian rhythm as he has been staying up throughout the night and sleeping a lot during the day and there is some concern for delirium   10/24: consulted anesthesia for MRI brain   10/28: MRI brain complete, decrease AM olanzapine from 10 to 5 mg  10/29: Morning olanzapine decreased to 2.5 mg, evening olanzapine decreased to 15 mg   11/1: Decreased evening olanzapine to 10 mg    11/4: Decreased evening olanzapine to 7.5 mg   11/5: Decreased evening olanzapine to 5 mg   11/11: Moved morning dose of olanzapine to the afternoon as patient appears more agitated in the afternoons/evenings  11/21: Started memantine 5 mg daily   11/26: Discontinued olanzapine 2.5 mg during the afternoon  12/2:   Discontinued memantine 5 mg, daily    The risks, benefits, alternatives, and side effects were discussed and understood by the patient and other caregivers.     Medical Assessment and Plan     Medical diagnoses to be addressed this admission:    # Hip Pain  - New right hip pain, and mild pain in multiple joints. Moderate response to topical antiinflammatory gel and lidocaine patch.   - Medicine consulted for recommendations 12/20    # CHF  # Hypertension  - Continue PTA medications  Furosemide 40 mg daily  Lisinopril 40 mg daily  Metoprolol 50 mg daily  Amlodipine 10 mg daily  Clonidine 0.1 mg BID  - Pitting edema in lower extremities, not painful 3+: Medicine consulted for recommendations 12/20  - Weight gain of about 8 pounds in about 2 months currently 249lb as of 01/07/25    # Hyperlipidemia  - Continue PTA Atorvastatin 40 mg     # Primary Hyperparathyroidism  # Hypercalcemia, hypophosphoremia   Increased Ca level to 10.9 and decreased Ph level to 2.3 in the ED. Suspicion patient's hypercalcemia could be contributing to symptoms of psychosis.  - Consulted endocrinology, who started cinacalcet 30 mg BID on 10/13  - 10/16 endocrinology recommended continuing to trend calcium and albumin to make sure patient does not become hypocalcemic and recommended holding cinacalcet   - 10/17, calcium and albumin wnl, endo recommended cinacalcet 30 mg once daily   - 10/21, per endo continue cincaclcet and recheck calcium and albumin on October 24  - 10/23: per endo, patient needs to follow up outpatient for further management of hyperparathyroidism      Medical course: Patient was physically examined by the ED  prior to being transferred to the unit and was found to be medically stable and appropriate for admission.      Consults: Psychiatry, Endocrinology (follow-up of hyperthyroidism / hypercalcemia and hypertension), neurology     Checklist     Legal Status: Committed   Ortega meds: Haldol, Clozaril, Risperdal, Invega, Zyprexa, Seroquel, Abilify    Safety Assessment:   Behavioral Orders   Procedures    Code 1 - Restrict to Unit    Routine Programming     As clinically indicated    Status 15     Every 15 minutes.       Risk Assessment:  Risk for harm is low  Risk factors: impulsive and past behaviors  Protective factors: family      SIO: No    Disposition: Pending stabilization, medication optimization, & development of a safe discharge plan.     Attestations     This patient was seen and discussed with my attending physician.  Evelia Grimm DO  PGY-1 Psychiatry Resident       Attestation:  This patient has been seen and evaluated by me, Pete Smith MD.  I have discussed this patient with the house staff team including the resident and/or medical student and I agree with the findings and plan in this note.    I have reviewed today's vital signs, medications, labs and imaging. Pete Smith MD , PhD.

## 2025-01-08 NOTE — PLAN OF CARE
Problem: Adult Behavioral Health Plan of Care  Goal: Absence of New-Onset Illness or Injury  Outcome: Progressing  Intervention: Identify and Manage Fall Risk  Recent Flowsheet Documentation  Taken 1/7/2025 1817 by Torito Cruz RN  Safety Measures:   environmental rounds completed   safety rounds completed     Problem: Suicidal Behavior  Goal: Suicidal Behavior is Absent or Managed  Outcome: Progressing   Goal Outcome Evaluation:    Plan of Care Reviewed With: patient Plan of Care Reviewed With: patient    Overall Patient Progress: improvingOverall Patient Progress: improving      Alert, intermittently confused, able to communicate needs. Visible in milieu, episode of yelling at a peers (redirectable), no further escalations. Argumentative at times and labile throughout the shift. Medication compliant. Patient denied any anxiety or depression, he denied suicidal/homicidal ideation. ADLs WNL, he denied pain or discomfort. Adequate food and fluids intake. Unable to collect urine sample, patient not able to comprehend the collection process.

## 2025-01-08 NOTE — PLAN OF CARE
Problem: Sleep Disturbance  Goal: Adequate Sleep/Rest  Outcome: Not Progressing  Patient appears calm, no behavioral issues noted. Patient cooperative with his care. Urine sample collected and send to the lab for ua with reflex to urine culture. UA result with mucus present. Patient denies pain and discomfort. Patient sleeping in the lounge area most of the shift. Patient slept for 3.75 hour this shift.  Patient on continuous safety check q15 minutes, done. Will continue with current plan of care.

## 2025-01-08 NOTE — PLAN OF CARE
BEH IP Unit Acuity Rating Score (UARS)  Patient is given one point for every criteria they meet.    CRITERIA SCORING   On a 72 hour hold, court hold, committed, stay of commitment, or revocation. 1    Patient LOS on BEH unit exceeds 20 days. 1  LOS: 88   Patient under guardianship, 55+, otherwise medically complex, or under age 11. 1   Suicide ideation without relief of precipitating factors. 0   Current plan for suicide. 0   Current plan for homicide. 0   Imminent risk or actual attempt to seriously harm another without relief of factors precipitating the attempt. 1   Severe dysfunction in daily living (ex: complete neglect for self care, extreme disruption in vegetative function, extreme deterioration in social interactions). 1   Recent (last 7 days) or current physical aggression in the ED or on unit. 0   Restraints or seclusion episode in past 72 hours. 0   Recent (last 7 days) or current verbal aggression, agitation, yelling, etc., while in the ED or unit. 0   Active psychosis. 1   Need for constant or near constant redirection (from leaving, from others, etc).  0   Intrusive or disruptive behaviors. 1   Patient requires 3 or more hours of individualized nursing care per 8-hour shift (i.e. for ADLs, meds, therapeutic interventions). 1   TOTAL 8

## 2025-01-08 NOTE — PLAN OF CARE
Problem: Psychotic Signs/Symptoms  Goal: Improved Behavioral Control (Psychotic Signs/Symptoms)  Outcome: Progressing     Problem: Manic or Hypomanic Signs/Symptoms  Goal: Improved Mood Symptoms (Manic/Hypomanic Signs/Symptoms)  Outcome: Progressing  Intervention: Optimize Emotion and Mood  Recent Flowsheet Documentation  Taken 1/8/2025 1616 by Tamie Fraser RN  Supportive Measures: active listening utilized     Problem: Psychotic Signs/Symptoms  Goal: Improved Behavioral Control (Psychotic Signs/Symptoms)  Outcome: Progressing   Goal Outcome Evaluation:    Pt was visible in the milieu having snacks and interacting with select  peers. Affect labile. Mood agitated and irritable upon approach. Pt denied any physical pain and discomfort. Denies all mental health and psych symptoms. Nutrition and hydration adequate. Pt was compliant with medications and contracted for safety. No behavioral concerns this shift.

## 2025-01-09 PROCEDURE — 250N000013 HC RX MED GY IP 250 OP 250 PS 637

## 2025-01-09 PROCEDURE — 99231 SBSQ HOSP IP/OBS SF/LOW 25: CPT | Mod: GC | Performed by: PSYCHIATRY & NEUROLOGY

## 2025-01-09 PROCEDURE — 124N000002 HC R&B MH UMMC

## 2025-01-09 RX ADMIN — CINACALCET 30 MG: 30 TABLET ORAL at 08:27

## 2025-01-09 RX ADMIN — ACETAMINOPHEN 650 MG: 325 TABLET, FILM COATED ORAL at 02:28

## 2025-01-09 RX ADMIN — Medication 25 MG: at 20:44

## 2025-01-09 RX ADMIN — SENNOSIDES AND DOCUSATE SODIUM 1 TABLET: 50; 8.6 TABLET ORAL at 08:26

## 2025-01-09 RX ADMIN — MELATONIN TAB 3 MG 3 MG: 3 TAB at 20:43

## 2025-01-09 RX ADMIN — LISINOPRIL 40 MG: 10 TABLET ORAL at 08:25

## 2025-01-09 RX ADMIN — ATORVASTATIN CALCIUM 40 MG: 20 TABLET, FILM COATED ORAL at 08:26

## 2025-01-09 RX ADMIN — AMLODIPINE BESYLATE 10 MG: 5 TABLET ORAL at 08:26

## 2025-01-09 RX ADMIN — FUROSEMIDE 40 MG: 40 TABLET ORAL at 08:26

## 2025-01-09 RX ADMIN — OLANZAPINE 5 MG: 5 TABLET, ORALLY DISINTEGRATING ORAL at 20:43

## 2025-01-09 RX ADMIN — CLONIDINE HYDROCHLORIDE 0.1 MG: 0.1 TABLET ORAL at 08:26

## 2025-01-09 RX ADMIN — CLONIDINE HYDROCHLORIDE 0.1 MG: 0.1 TABLET ORAL at 20:44

## 2025-01-09 RX ADMIN — METOPROLOL SUCCINATE 50 MG: 50 TABLET, EXTENDED RELEASE ORAL at 08:26

## 2025-01-09 RX ADMIN — Medication 1 PATCH: at 08:37

## 2025-01-09 ASSESSMENT — ACTIVITIES OF DAILY LIVING (ADL)
ADLS_ACUITY_SCORE: 66

## 2025-01-09 NOTE — PLAN OF CARE
Problem: Adult Behavioral Health Plan of Care  Goal: Optimized Coping Skills in Response to Life Stressors  Intervention: Promote Effective Coping Strategies  Recent Flowsheet Documentation  Taken 1/9/2025 0800 by Vera Graham RN  Supportive Measures: active listening utilized     Problem: Adult Behavioral Health Plan of Care  Goal: Absence of New-Onset Illness or Injury  Intervention: Prevent VTE (Venous Thromboembolism)  Recent Flowsheet Documentation  Taken 1/9/2025 0800 by Vera Graham RN  VTE Prevention/Management: SCDs off (sequential compression devices)     Goal Outcome Evaluation:    Plan of Care Reviewed With: patient     Patient's affect was flat and blunted. His mood was calm. He denied pain. He denied all psych MH symptoms and contracted for safety. Patient was medication compliant. His hygiene was appropriate. He was eating and drinking adequately. Patient said he has not had a BM even with the Senokot intervention from yesterday. Another scheduled senokot was given this morning. Patient was intermittently visible in the milieu. He was seen interacting and socializing with peers. Patient requested for a chair in his room. He was re-directed. There was no behavioral escalations or safety concerns noted this shift. Will continue current plan of care and assist when need.   BP (!) 141/72   Pulse 60   Temp 98.2  F (36.8  C)   Resp 18   Wt 112.9 kg (248 lb 12.8 oz)   SpO2 95%   BMI 40.16 kg/m

## 2025-01-09 NOTE — PLAN OF CARE
Team Note Due:  Monday    Assessment/Intervention/Current Symtoms and Care Coordination:  Chart review and met with team, discussed pt progress, symptomology, and response to treatment.  Discussed the discharge plan and any potential impediments to discharge. Clifford Aaron is currently emergency guardian/conservator until 01/20/2025.    Met with pt do notify him of MnCHOICE  coming tomorrow. I let pt know I would be present as well but the purpose of her coming to meet with pt is to help get funding for a place to discharge to. Pt in agreement with meeting for assessment tomorrow.    Sent update to WAYNE Ryan.    Discharge Plan or Goal:  Memory care facility     Barriers to Discharge:  Patient requires further psychiatric stabilization due to current symptomology, medication management with changes subject to provider, coordination with outside supports, and aftercare planning. Pt is under civil commitment.     Referral Status:    Memory Care facilities currently in process:  [facility]. Tour completed 11/27.  [facility] [place]. Tour completed 11/29.  [facility]. Tour completed 11/30. Per Clifford on 1/8, she would like to move forward with a referral. Records sent 1/8.  [facility] [place]. Tour scheduled 1/8/25. Have immediate openings and will accept EW.    Memory Care facilities pending family review:  [facility].  [facility] [place].  [facility] [place].  [facility].  [facility].    Memory Care facilities declined:  [facility] (private pay only, no EW).  [facility] [address] (private pay only, no EW).  [facility] (no openings).  [facility]. Tour completed 11/29. Records sent 12/2/24. Declined 12/19/24 (don't feel they are an appropriate facility for pt's needs).  Lorraine ():  [email or web address]; [phone]  Isatu (director of nursing): [email or web address]  [facility].  Denied 12/26 (concerned about dementia with psychosis, history of hallucinations, high elopement risk, still on 1:1).  Nikki Noel: [email or web address]; Office [phone], Fax [phone]   [facility]. Not accepting EW as of 1/7/25.    Legal Status:  MI Commitment with [facility]  File Number: [id number]  Start and expiration date of commitment: 10/24/24 - 04/24/25    [facility] meds: Haldol, Clozaril, Risperdal, Invega, Zyprexa, Seroquel, Abilify    PPS/CM:  Shelby Chowdary: [phone]  [email or web address].    Contacts:  Maria C Aaron (Daughter): [phone]   Clifford Agnieszka (ex-wife): [phone]     No Del Angel (guardianship/conservatorship ): [phone]  [email or web address]    Derrell Duff (MnCHOICE ): [phone]  [email or web address]     Upcoming Meetings and Dates/Important Information and next steps:  MnCHOICE Assessment Friday 1/10/25 at 10am  Follow up with family regarding list of Memory Care facilities  Discharge planning when appropriate  Pt does not qualify for MA per financial counselor on 11/8/2024. Pt will likely privately pay for Memory Care until he qualifies for MA/waivers in the future.    Provisional Discharge and Change of Status needed at discharge

## 2025-01-09 NOTE — PROGRESS NOTES
----------------------------------------------------------------------------------------------------------  [facility]  Psychiatry Progress Note  Hospital Day #89     Interim History:     The patient's care was discussed with the treatment team and chart notes were reviewed.    Identifier: Estevan Aaron is a 64 year old male with previous psychiatric diagnoses of  generalized anxiety disorder, Neurocognitive disorder, admitted from the ED 10/12/2024 due to concern for HI and psychosis in the context of medical issues (hyperthyroidism, hypercalcemia) psychosocial stressors including family dynamics with recent possible divorce.    Sleep: 5.25 hours (01/09/25 0600)  Scheduled medications: Took all scheduled medications as prescribed  Psychiatric PRN medications:   Last 24H PRN:     acetaminophen (TYLENOL) tablet 650 mg, 650 mg at 01/09/25 0228    Staff Report:   Was visible in the milieu having snacks and interacting with select peers. Affect labile. Denies all mental health symptoms.     See staff notes for more information     Subjective:     Patient Interview:  Santos was seen in his room. Says that he's been doing well.   Discusses with the writer what he was watching on the TV before the interview. Says that he had a good day yesterday. Notes that his plan for the day is to attend different groups.     ROS:  See above     Objective:     Vitals:  /70   Pulse 53   Temp 96.9  F (36.1  C) (Temporal)   Resp 18   Wt 113 kg (249 lb 1.6 oz)   SpO2 97%   BMI 40.21 kg/m      Allergies:  No Known Allergies    Current Medications:  Scheduled:  Current Facility-Administered Medications   Medication Dose Route Frequency Provider Last Rate Last Admin    acetaminophen (TYLENOL) tablet 650 mg  650 mg Oral Q4H PRN Nikki Caceres MD   650 mg at 01/09/25 0228    alum & mag hydroxide-simethicone (MAALOX) suspension 30 mL  30 mL Oral Q4H PRN Nikki Caceres MD   30 mL  at 10/17/24 0837    amLODIPine (NORVASC) tablet 10 mg  10 mg Oral Daily Presley Barrera MD   10 mg at 01/08/25 0902    atorvastatin (LIPITOR) tablet 40 mg  40 mg Oral Daily Nikki Caceres MD   40 mg at 01/08/25 0903    cholecalciferol (VITAMIN D3) capsule 1,250 mcg  1,250 mcg Oral Q7 Days Evelia Grimm DO   1,250 mcg at 01/07/25 1300    cinacalcet (SENSIPAR) tablet 30 mg  30 mg Oral Daily Presley Barrera MD   30 mg at 01/08/25 0902    cloNIDine (CATAPRES) tablet 0.1 mg  0.1 mg Oral BID Presley Barrera MD   0.1 mg at 01/08/25 2017    diclofenac (VOLTAREN) 1 % topical gel 2 g  2 g Topical 4x Daily PRN Rocío Lopez MD   2 g at 12/22/24 2032    furosemide (LASIX) tablet 40 mg  40 mg Oral Daily Presley Barrera MD   40 mg at 01/08/25 0903    gabapentin (NEURONTIN) capsule 100 mg  100 mg Oral Q6H PRN Nikki Caceres MD   100 mg at 12/24/24 0046    hydrocortisone (CORTAID) 0.5 % cream   Topical Daily PRN Savi Chamorro MD        Lidocaine (LIDOCARE) 4 % Patch 1 patch  1 patch Transdermal Q24H PRN Rocío Lopez MD   1 patch at 12/22/24 2023    lisinopril (ZESTRIL) tablet 40 mg  40 mg Oral Daily Presley Barrera MD   40 mg at 01/08/25 0903    melatonin tablet 3 mg  3 mg Oral At Bedtime Presley Barrera MD   3 mg at 01/08/25 2016    metoprolol succinate ER (TOPROL XL) 24 hr tablet 50 mg  50 mg Oral Daily Presley Barrera MD   50 mg at 01/08/25 0903    nicotine (NICODERM CQ) 14 MG/24HR 24 hr patch 1 patch  1 patch Transdermal Daily Evelia Grimm DO   1 patch at 01/08/25 0909    nicotine (NICORETTE) gum 2 mg  2 mg Buccal Q1H PRN González Garcia MD   2 mg at 10/24/24 1254    OLANZapine zydis (zyPREXA) ODT tab 5 mg  5 mg Oral At Bedtime González Garcia MD   5 mg at 01/08/25 2016    Or    OLANZapine (zyPREXA) injection 10 mg  10 mg Intramuscular At Bedtime González Garcia MD        OLANZapine (zyPREXA) tablet 5 mg  5 mg Oral TID PRN Evelia Grimm DO   5 mg at 01/05/25  1645    Or    OLANZapine (zyPREXA) injection 5 mg  5 mg Intramuscular TID PRN Evelia Grimm DO        polyethylene glycol (MIRALAX) Packet 17 g  17 g Oral Daily PRN Nikki Caceres MD        senna-docusate (SENOKOT-S/PERICOLACE) 8.6-50 MG per tablet 1 tablet  1 tablet Oral Daily Evelia Grimm DO   1 tablet at 01/08/25 0951    traZODone (DESYREL) half-tab 25 mg  25 mg Oral At Bedtime Rocío Lopez MD   25 mg at 01/08/25 2016    traZODone (DESYREL) half-tab 25 mg  25 mg Oral At Bedtime PRN Evelia Grimm DO   25 mg at 12/24/24 0046       PRN:  Current Facility-Administered Medications   Medication Dose Route Frequency Provider Last Rate Last Admin    acetaminophen (TYLENOL) tablet 650 mg  650 mg Oral Q4H PRN Nikki Caceres MD   650 mg at 01/09/25 0228    alum & mag hydroxide-simethicone (MAALOX) suspension 30 mL  30 mL Oral Q4H PRN Nikki Caceres MD   30 mL at 10/17/24 0837    amLODIPine (NORVASC) tablet 10 mg  10 mg Oral Daily Presley Barrera MD   10 mg at 01/08/25 0902    atorvastatin (LIPITOR) tablet 40 mg  40 mg Oral Daily Nikki Caceres MD   40 mg at 01/08/25 0903    cholecalciferol (VITAMIN D3) capsule 1,250 mcg  1,250 mcg Oral Q7 Days Evelia Grimm DO   1,250 mcg at 01/07/25 1300    cinacalcet (SENSIPAR) tablet 30 mg  30 mg Oral Daily Presley Barrera MD   30 mg at 01/08/25 0902    cloNIDine (CATAPRES) tablet 0.1 mg  0.1 mg Oral BID Presley Barrera MD   0.1 mg at 01/08/25 2017    diclofenac (VOLTAREN) 1 % topical gel 2 g  2 g Topical 4x Daily PRN Rocío Lopez MD   2 g at 12/22/24 2032    furosemide (LASIX) tablet 40 mg  40 mg Oral Daily Presley Barrera MD   40 mg at 01/08/25 0903    gabapentin (NEURONTIN) capsule 100 mg  100 mg Oral Q6H PRN Nikki Caceres MD   100 mg at 12/24/24 0046    hydrocortisone (CORTAID) 0.5 % cream   Topical Daily PRN Savi Chamorro MD        Lidocaine (LIDOCARE) 4 % Patch 1 patch  1 patch Transdermal Q24H PRN Salazar  Rocío Juarez MD   1 patch at 12/22/24 2023    lisinopril (ZESTRIL) tablet 40 mg  40 mg Oral Daily Presley Barrera MD   40 mg at 01/08/25 0903    melatonin tablet 3 mg  3 mg Oral At Bedtime Presley Barrera MD   3 mg at 01/08/25 2016    metoprolol succinate ER (TOPROL XL) 24 hr tablet 50 mg  50 mg Oral Daily Presley Barrera MD   50 mg at 01/08/25 0903    nicotine (NICODERM CQ) 14 MG/24HR 24 hr patch 1 patch  1 patch Transdermal Daily Evelia Grimm DO   1 patch at 01/08/25 0909    nicotine (NICORETTE) gum 2 mg  2 mg Buccal Q1H PRN González Garcai MD   2 mg at 10/24/24 1254    OLANZapine zydis (zyPREXA) ODT tab 5 mg  5 mg Oral At Bedtime González Garcia MD   5 mg at 01/08/25 2016    Or    OLANZapine (zyPREXA) injection 10 mg  10 mg Intramuscular At Bedtime Gonzálze Garcia MD        OLANZapine (zyPREXA) tablet 5 mg  5 mg Oral TID PRN Evelia Grimm DO   5 mg at 01/05/25 1645    Or    OLANZapine (zyPREXA) injection 5 mg  5 mg Intramuscular TID PRN Evelia Grimm DO        polyethylene glycol (MIRALAX) Packet 17 g  17 g Oral Daily PRN Nikki Caceres MD        senna-docusate (SENOKOT-S/PERICOLACE) 8.6-50 MG per tablet 1 tablet  1 tablet Oral Daily Evelia Grimm DO   1 tablet at 01/08/25 0951    traZODone (DESYREL) half-tab 25 mg  25 mg Oral At Bedtime Rocío Lopez MD   25 mg at 01/08/25 2016    traZODone (DESYREL) half-tab 25 mg  25 mg Oral At Bedtime PRN Evelia Grimm DO   25 mg at 12/24/24 0046     Labs and Imaging:  Data this admission:  - CBC unremarkable  - CMP unremarkable  - TSH normal  - UDS negative  - Vit D low  - Hgb A1c 5.9 (10/13/24)  - Lipids unremarkable  - Vit B12 normal  - Folate normal  - Urinalysis unremarkable  - EKG normal sinus rhythm, QTc 390 ms  - Head CT showed no acute changes  - HIV non reactive  - Treponema antibody non reactive  - ESR wnl   - Ceruloplasmin wnl   - BOOGIE negative  - Lyme negative      Parathyroid hormone:   85 (10/13)     Calcium:  11.4  "(10/14) -> 10.3 (10/16) -> 9.8 (10/19) -> 10.6 (10/21) -> 10.3 (10/23)     Albumin:  4.3 (10/14) -->  4.3 (10/16) -> 4.1 (10/17) -> 4.2 (10/19) -> 4.5 (10/21) -> 4.3 (10/23)      Mental Status Exam:   Oriented to: Oriented to self only  General:  Awake and Alert  Appearance:  appears stated age, Grooming is adequate, and Dressed in his own clothes  Behavior/Attitude:  Engaged   Eye Contact: Downcast distracted  Psychomotor: No evidence of tics, dystonia, or tardive dyskinesia  no catatonia present  Speech:  appropriate volume/tone, talkative, and spontaneous , apraxia of speech  Language: Fluent in English with appropriate syntax and vocabulary.  Mood:  \"good\"  Affect:  labile, blunted  Thought Process:  perseverative, tangential, and confused  Thought Content:    delusion of confusion . Patient denies paranoia  Associations:  loose  Insight:  impaired   Judgment:  impaired   Impulse control: limited  Attention Span:   mildly decreased  Concentration:   distractible  Recent and Remote Memory:   remote memory intact, recent memory impaired  Fund of Knowledge: average  Muscle Strength and Tone: normal  Gait and Station: Normal     Psychiatric Assessment     Estevan Aaron is a 64 year old male with previous psychiatric diagnoses of GEORGINA admitted from the ER on 10/12/2024 due to concern for HI and psychosis in the context of medical issues (hyperthyroidism, hypercalcemia), psychosocial stressors including with recent divorce and selling his home. This is the patient's first psychiatric hospitalization. The MSE on admission was pertinent for a thought process which was perseverative, circumstantial, tangential, disorganized and tangential; with rambling and looseness of associations. Psychological contributions to presentation included lack of insight. Social factors contributing to presentation included isolation, recent divorce, selling his house, and moving from hotel to hotel. Biological contributions to presentation " included a history of hyperparathyroidism with chronic hypercalcemia per charts as well as a history of methamphetamine use per collateral from ex-wife.     Per collateral from ex-wife, Santos has had paranoid ideations since they first met. He has always felt like people are out to get him or trying to rip him off. She says that he has had visual hallucinations (he will point things out that the rest of the family can't see) for a long time, but the family would just go along with him to avoid making him angry. This timeline and presentation could be consistent with diagnosis of paranoid personality disorder. Things began to worsen around the beginning of the COVID pandemic, when Santos became more isolated and the family started to notice cognitive issues such as impaired memory. Other things that could be contributing to his presentation is hyperparathyroidism with hypercalcemia. However, patient's calcium returned to normal limits on 10/16, and patient continues to have disorganized behavior unrelated to calcium elevation, so likely this is not a significant contributing factor to patient's presentation.      Currently, Santos is at times disoriented, but easily re-directable. Presents with some difficulty of word articulation, which contributes to his frustration when interacting with psych team, as he finds it difficult to explain himself. He is able to take care of his ADLs without much assistance and he is mostly independent in the unit. On the other hand, his confused behavior tend to increase in the evenings, seemingly related to  Neurocognitive Disorder diagnosis, and this could evolved to be his new baseline. Either way, Santos does not present as a danger to himself or other so his SIO was discontinued. Santos does not present with any new episodes of physical agitation and is able to attend groups and interact with peers without major altercations. Patient currently is stable with current neuroleptic dose, patient  probably wont benefit from any modification in current therapy.     Family care meeting 01/02 with psych team: update on current availability of placement options and Santos's current presentation. Family aware of Santos's level of functionality with ADLs, besides  disorientation episodes at evenings, that only requires minimal intervention for redirection. Still pending visits to possible memory care placements by family.    Santos appeared more confused throughout the day, different from his usual baseline, per staff report. No changed in vital signs but concern for acute delirium. UA was requested.    Goal of treatment:  Procure a memory care placement.      Psychiatric Plan by Diagnosis     Today's changes:  - No medication changes     # Major Neurocognitive Disorder  1. Medications:  - Olanzapine 5 mg at bedtime     3. Additional Plans:  - Patient will be treated in therapeutic milieu with appropriate individual and group therapies as described  - Pending memory facility placement.      # Unspecified anxiety vs Generalized Anxiety Disorder  - Monitor for symptoms.  - Fluoxetine held due to suspicion of ongoing manic symptoms     Psychiatric Hospital Course:      Estevan Aaron was admitted to [facility] on a 72 hour hold.   Medications:  PTA fluoxetine was held due to concern for worsening of zelalem   New medications started at the time of admission include Zyprexa.   Increased olanzapine 10 mg at bedtime was to 10 mg BID (10/14)   Increased olanzapine 10 mg BID to 10 mg during day and 15 mg at bedtime (10/15)  10/21: increased olanzapine pm dose from 15 to 20 mg  10/23: started melatonin 3 mg at 7 pm to help patient with circadian rhythm as he has been staying up throughout the night and sleeping a lot during the day and there is some concern for delirium   10/24: consulted anesthesia for MRI brain   10/28: MRI brain complete, decrease AM olanzapine from 10 to 5 mg  10/29: Morning olanzapine decreased to 2.5 mg, evening  olanzapine decreased to 15 mg   11/1: Decreased evening olanzapine to 10 mg   11/4: Decreased evening olanzapine to 7.5 mg   11/5: Decreased evening olanzapine to 5 mg   11/11: Moved morning dose of olanzapine to the afternoon as patient appears more agitated in the afternoons/evenings  11/21: Started memantine 5 mg daily   11/26: Discontinued olanzapine 2.5 mg during the afternoon  12/2:   Discontinued memantine 5 mg, daily    The risks, benefits, alternatives, and side effects were discussed and understood by the patient and other caregivers.     Medical Assessment and Plan     Medical diagnoses to be addressed this admission:    # Hip Pain  - New right hip pain, and mild pain in multiple joints. Moderate response to topical antiinflammatory gel and lidocaine patch.   - Medicine consulted for recommendations 12/20    # CHF  # Hypertension  - Continue PTA medications  Furosemide 40 mg daily  Lisinopril 40 mg daily  Metoprolol 50 mg daily  Amlodipine 10 mg daily  Clonidine 0.1 mg BID  - Pitting edema in lower extremities, not painful 3+: Medicine consulted for recommendations 12/20  - Weight gain of about 8 pounds in about 2 months currently 249lb as of 01/07/25    # Hyperlipidemia  - Continue PTA Atorvastatin 40 mg     # Primary Hyperparathyroidism  # Hypercalcemia, hypophosphoremia   Increased Ca level to 10.9 and decreased Ph level to 2.3 in the ED. Suspicion patient's hypercalcemia could be contributing to symptoms of psychosis.  - Consulted endocrinology, who started cinacalcet 30 mg BID on 10/13  - 10/16 endocrinology recommended continuing to trend calcium and albumin to make sure patient does not become hypocalcemic and recommended holding cinacalcet   - 10/17, calcium and albumin wnl, endo recommended cinacalcet 30 mg once daily   - 10/21, per endo continue cincaclcet and recheck calcium and albumin on October 24  - 10/23: per endo, patient needs to follow up outpatient for further management of  hyperparathyroidism      Medical course: Patient was physically examined by the ED prior to being transferred to the unit and was found to be medically stable and appropriate for admission.      Consults: Psychiatry, Endocrinology (follow-up of hyperthyroidism / hypercalcemia and hypertension), neurology     Checklist     Legal Status: Committed   [facility] meds: Haldol, Clozaril, Risperdal, Invega, Zyprexa, Seroquel, Abilify    Safety Assessment:   Behavioral Orders   Procedures    Code 1 - Restrict to Unit    Routine Programming     As clinically indicated    Status 15     Every 15 minutes.       Risk Assessment:  Risk for harm is low  Risk factors: impulsive and past behaviors  Protective factors: family      SIO: No    Disposition: Pending stabilization, medication optimization, & development of a safe discharge plan.     Attestations     This patient was seen and discussed with my attending physician.  Evelia Grimm DO  PGY-1 Psychiatry Resident     Attestation:  This patient has been seen and evaluated by me, Pete Smith MD.  I have discussed this patient with the house staff team including the resident and/or medical student and I agree with the findings and plan in this note.    I have reviewed today's vital signs, medications, labs and imaging. Pete Smith MD , PhD.

## 2025-01-09 NOTE — PLAN OF CARE
BEH IP Unit Acuity Rating Score (UARS)  Patient is given one point for every criteria they meet.    CRITERIA SCORING   On a 72 hour hold, court hold, committed, stay of commitment, or revocation. 1    Patient LOS on BEH unit exceeds 20 days. 1  LOS: 89   Patient under guardianship, 55+, otherwise medically complex, or under age 11. 1   Suicide ideation without relief of precipitating factors. 0   Current plan for suicide. 0   Current plan for homicide. 0   Imminent risk or actual attempt to seriously harm another without relief of factors precipitating the attempt. 1   Severe dysfunction in daily living (ex: complete neglect for self care, extreme disruption in vegetative function, extreme deterioration in social interactions). 1   Recent (last 7 days) or current physical aggression in the ED or on unit. 0   Restraints or seclusion episode in past 72 hours. 0   Recent (last 7 days) or current verbal aggression, agitation, yelling, etc., while in the ED or unit. 0   Active psychosis. 1   Need for constant or near constant redirection (from leaving, from others, etc).  0   Intrusive or disruptive behaviors. 1   Patient requires 3 or more hours of individualized nursing care per 8-hour shift (i.e. for ADLs, meds, therapeutic interventions). 1   TOTAL 8

## 2025-01-09 NOTE — PLAN OF CARE
Problem: Pain Acute  Goal: Optimal Pain Control and Function  Intervention: Develop Pain Management Plan  Recent Flowsheet Documentation  Taken 1/9/2025 0228 by Laila Lucero RN  Pain Management Interventions:   medication (see MAR)   heat applied        Patient complained of R leg pain-medicated with Tylenol, warm pack applied to R hip per his request. Patient's BLE elevated on a pillows-slept after. Patient woke up early at 0545, sat in the chair, reported pain better. Patient slept in the chair in the hallway. Patient calm, cooperative with his cares. No behavioral issues or safety concerns. Patient on q 15 mins safety checks. Patient had 5.25 total hours of sleep. Will continue with current plan of care.

## 2025-01-10 PROCEDURE — 99231 SBSQ HOSP IP/OBS SF/LOW 25: CPT | Mod: GC | Performed by: PSYCHIATRY & NEUROLOGY

## 2025-01-10 PROCEDURE — 250N000013 HC RX MED GY IP 250 OP 250 PS 637

## 2025-01-10 PROCEDURE — 124N000002 HC R&B MH UMMC

## 2025-01-10 RX ADMIN — Medication 1 PATCH: at 08:08

## 2025-01-10 RX ADMIN — Medication 25 MG: at 20:04

## 2025-01-10 RX ADMIN — OLANZAPINE 5 MG: 5 TABLET, ORALLY DISINTEGRATING ORAL at 20:05

## 2025-01-10 RX ADMIN — LISINOPRIL 40 MG: 10 TABLET ORAL at 08:08

## 2025-01-10 RX ADMIN — ATORVASTATIN CALCIUM 40 MG: 20 TABLET, FILM COATED ORAL at 08:08

## 2025-01-10 RX ADMIN — AMLODIPINE BESYLATE 10 MG: 5 TABLET ORAL at 08:08

## 2025-01-10 RX ADMIN — FUROSEMIDE 40 MG: 40 TABLET ORAL at 08:08

## 2025-01-10 RX ADMIN — METOPROLOL SUCCINATE 50 MG: 50 TABLET, EXTENDED RELEASE ORAL at 08:08

## 2025-01-10 RX ADMIN — CINACALCET 30 MG: 30 TABLET ORAL at 08:08

## 2025-01-10 RX ADMIN — CLONIDINE HYDROCHLORIDE 0.1 MG: 0.1 TABLET ORAL at 20:04

## 2025-01-10 RX ADMIN — CLONIDINE HYDROCHLORIDE 0.1 MG: 0.1 TABLET ORAL at 08:08

## 2025-01-10 RX ADMIN — MELATONIN TAB 3 MG 3 MG: 3 TAB at 20:04

## 2025-01-10 RX ADMIN — SENNOSIDES AND DOCUSATE SODIUM 1 TABLET: 50; 8.6 TABLET ORAL at 08:08

## 2025-01-10 ASSESSMENT — ACTIVITIES OF DAILY LIVING (ADL)
ADLS_ACUITY_SCORE: 66
HYGIENE/GROOMING: INDEPENDENT
ADLS_ACUITY_SCORE: 66
ORAL_HYGIENE: INDEPENDENT
ADLS_ACUITY_SCORE: 66
DRESS: INDEPENDENT
ADLS_ACUITY_SCORE: 66
ADLS_ACUITY_SCORE: 66
HYGIENE/GROOMING: HANDWASHING;INDEPENDENT
ADLS_ACUITY_SCORE: 66
DRESS: INDEPENDENT;STREET CLOTHES
ORAL_HYGIENE: INDEPENDENT
ADLS_ACUITY_SCORE: 66

## 2025-01-10 NOTE — PROGRESS NOTES
"  ----------------------------------------------------------------------------------------------------------  [facility]  Psychiatry Progress Note  Hospital Day #90     Interim History:     The patient's care was discussed with the treatment team and chart notes were reviewed.    Identifier: Estevan Aaron is a 64 year old male with previous psychiatric diagnoses of  generalized anxiety disorder, Neurocognitive disorder, admitted from the ED 10/12/2024 due to concern for HI and psychosis in the context of medical issues (hyperthyroidism, hypercalcemia) psychosocial stressors including family dynamics with recent possible divorce.    Sleep: 5.75 hours (01/10/25 0600)  Scheduled medications: Took all scheduled medications as prescribed  Psychiatric PRN medications:       Staff Report:   Was visible in the milieu, often watching television and interacting with peers. Pacing in the hallway. Seen with mood lability intermittently.     See staff notes for more information     Subjective:     Patient Interview:  Santos was interviewed in the milieu. Patient initially hesitant to engage with the provider. Notes that \"you guys are not set up for me (referring to housing outside the hospital).\" Says he's just going to build his house.   Notes that he hasn't followed up with his daughters in weeks. 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   When asked about if there's anything he needs help with, he says \"give me 300,000 dollars\" and talks about loans and being a homeowner.     Later encounter was told to be respectful towards peers and keep the milieu a therapeutic place for others.    ROS:  See above     Objective:     Vitals:  /63 (BP Location: Left arm, Patient Position: Sitting, Cuff Size: Adult Large)   Pulse 61   Temp 98.4  F (36.9  C)   Resp 16   Wt 112.9 kg (248 lb 12.8 " oz)   SpO2 94%   BMI 40.16 kg/m      Allergies:  No Known Allergies    Current Medications:  Scheduled:  Current Facility-Administered Medications   Medication Dose Route Frequency Provider Last Rate Last Admin    acetaminophen (TYLENOL) tablet 650 mg  650 mg Oral Q4H PRN Nikki Caceres MD   650 mg at 01/09/25 0228    alum & mag hydroxide-simethicone (MAALOX) suspension 30 mL  30 mL Oral Q4H PRN Nikki Caceres MD   30 mL at 10/17/24 0837    amLODIPine (NORVASC) tablet 10 mg  10 mg Oral Daily Presley Barrera MD   10 mg at 01/10/25 0808    atorvastatin (LIPITOR) tablet 40 mg  40 mg Oral Daily Nikki Caceres MD   40 mg at 01/10/25 0808    cholecalciferol (VITAMIN D3) capsule 1,250 mcg  1,250 mcg Oral Q7 Days SirishaEveliaDO   1,250 mcg at 01/07/25 1300    cinacalcet (SENSIPAR) tablet 30 mg  30 mg Oral Daily Presley Barrera MD   30 mg at 01/10/25 0808    cloNIDine (CATAPRES) tablet 0.1 mg  0.1 mg Oral BID Presley Barrera MD   0.1 mg at 01/10/25 0808    diclofenac (VOLTAREN) 1 % topical gel 2 g  2 g Topical 4x Daily PRN Rocío Lopez MD   2 g at 12/22/24 2032    furosemide (LASIX) tablet 40 mg  40 mg Oral Daily Presley Barrera MD   40 mg at 01/10/25 0808    gabapentin (NEURONTIN) capsule 100 mg  100 mg Oral Q6H PRN Nikki Caceres MD   100 mg at 12/24/24 0046    hydrocortisone (CORTAID) 0.5 % cream   Topical Daily PRN Savi Chamorro MD        Lidocaine (LIDOCARE) 4 % Patch 1 patch  1 patch Transdermal Q24H PRN Rocío Lopez MD   1 patch at 12/22/24 2023    lisinopril (ZESTRIL) tablet 40 mg  40 mg Oral Daily Presley Barrera MD   40 mg at 01/10/25 0808    melatonin tablet 3 mg  3 mg Oral At Bedtime Presley Barrera MD   3 mg at 01/09/25 2043    metoprolol succinate ER (TOPROL XL) 24 hr tablet 50 mg  50 mg Oral Daily Presley Barrera MD   50 mg at 01/10/25 0808    nicotine (NICODERM CQ) 14 MG/24HR 24 hr patch 1 patch  1 patch Transdermal Daily Evelia Grimm,  DO   1 patch at 01/10/25 0808    nicotine (NICORETTE) gum 2 mg  2 mg Buccal Q1H PRN González Garcia MD   2 mg at 10/24/24 1254    OLANZapine zydis (zyPREXA) ODT tab 5 mg  5 mg Oral At Bedtime González Garcia MD   5 mg at 01/09/25 2043    Or    OLANZapine (zyPREXA) injection 10 mg  10 mg Intramuscular At Bedtime González Garcia MD        OLANZapine (zyPREXA) tablet 5 mg  5 mg Oral TID PRN Evelia Grimm DO   5 mg at 01/05/25 1645    Or    OLANZapine (zyPREXA) injection 5 mg  5 mg Intramuscular TID PRN Evelia Grimm DO        polyethylene glycol (MIRALAX) Packet 17 g  17 g Oral Daily PRN Nikki Caceres MD        senna-docusate (SENOKOT-S/PERICOLACE) 8.6-50 MG per tablet 1 tablet  1 tablet Oral Daily Evelia Grimm DO   1 tablet at 01/10/25 0808    traZODone (DESYREL) half-tab 25 mg  25 mg Oral At Bedtime Rocío Lopez MD   25 mg at 01/09/25 2044    traZODone (DESYREL) half-tab 25 mg  25 mg Oral At Bedtime PRN Evelia Grimm DO   25 mg at 12/24/24 0046       PRN:  Current Facility-Administered Medications   Medication Dose Route Frequency Provider Last Rate Last Admin    acetaminophen (TYLENOL) tablet 650 mg  650 mg Oral Q4H PRN Nikki Caceres MD   650 mg at 01/09/25 0228    alum & mag hydroxide-simethicone (MAALOX) suspension 30 mL  30 mL Oral Q4H PRN Nikki Caceres MD   30 mL at 10/17/24 0837    amLODIPine (NORVASC) tablet 10 mg  10 mg Oral Daily Presley Barrera MD   10 mg at 01/10/25 0808    atorvastatin (LIPITOR) tablet 40 mg  40 mg Oral Daily Nikki Caceres MD   40 mg at 01/10/25 0808    cholecalciferol (VITAMIN D3) capsule 1,250 mcg  1,250 mcg Oral Q7 Days Evelia Grimm DO   1,250 mcg at 01/07/25 1300    cinacalcet (SENSIPAR) tablet 30 mg  30 mg Oral Daily Presley Barrera MD   30 mg at 01/10/25 0808    cloNIDine (CATAPRES) tablet 0.1 mg  0.1 mg Oral BID Presley Barrera MD   0.1 mg at 01/10/25 0808    diclofenac (VOLTAREN) 1 % topical gel 2 g  2 g Topical 4x Daily PRN Salazar  Rocío Juarez MD   2 g at 12/22/24 2032    furosemide (LASIX) tablet 40 mg  40 mg Oral Daily Presley Barrera MD   40 mg at 01/10/25 0808    gabapentin (NEURONTIN) capsule 100 mg  100 mg Oral Q6H PRN Nikki Caceres MD   100 mg at 12/24/24 0046    hydrocortisone (CORTAID) 0.5 % cream   Topical Daily PRN Savi Chamorro MD        Lidocaine (LIDOCARE) 4 % Patch 1 patch  1 patch Transdermal Q24H PRN Rocío Lopez MD   1 patch at 12/22/24 2023    lisinopril (ZESTRIL) tablet 40 mg  40 mg Oral Daily Presley Barrera MD   40 mg at 01/10/25 0808    melatonin tablet 3 mg  3 mg Oral At Bedtime Presley Barrera MD   3 mg at 01/09/25 2043    metoprolol succinate ER (TOPROL XL) 24 hr tablet 50 mg  50 mg Oral Daily Presley Barrera MD   50 mg at 01/10/25 0808    nicotine (NICODERM CQ) 14 MG/24HR 24 hr patch 1 patch  1 patch Transdermal Daily Evelia Grimm DO   1 patch at 01/10/25 0808    nicotine (NICORETTE) gum 2 mg  2 mg Buccal Q1H PRN González Garcia MD   2 mg at 10/24/24 1254    OLANZapine zydis (zyPREXA) ODT tab 5 mg  5 mg Oral At Bedtime González Garcia MD   5 mg at 01/09/25 2043    Or    OLANZapine (zyPREXA) injection 10 mg  10 mg Intramuscular At Bedtime González Garcia MD        OLANZapine (zyPREXA) tablet 5 mg  5 mg Oral TID PRN Evelia Grimm DO   5 mg at 01/05/25 1645    Or    OLANZapine (zyPREXA) injection 5 mg  5 mg Intramuscular TID PRN Evelia Grimm DO        polyethylene glycol (MIRALAX) Packet 17 g  17 g Oral Daily PRN Nikki Caceres MD        senna-docusate (SENOKOT-S/PERICOLACE) 8.6-50 MG per tablet 1 tablet  1 tablet Oral Daily Evelia Grimm DO   1 tablet at 01/10/25 0808    traZODone (DESYREL) half-tab 25 mg  25 mg Oral At Bedtime Rocío Lopez MD   25 mg at 01/09/25 2044    traZODone (DESYREL) half-tab 25 mg  25 mg Oral At Bedtime PRN Evelia Grimm DO   25 mg at 12/24/24 0046     Labs and Imaging:  Data this admission:  - CBC unremarkable  -  "CMP unremarkable  - TSH normal  - UDS negative  - Vit D low  - Hgb A1c 5.9 (10/13/24)  - Lipids unremarkable  - Vit B12 normal  - Folate normal  - Urinalysis unremarkable  - EKG normal sinus rhythm, QTc 390 ms  - Head CT showed no acute changes  - HIV non reactive  - Treponema antibody non reactive  - ESR wnl   - Ceruloplasmin wnl   - BOOGIE negative  - Lyme negative      Parathyroid hormone:   85 (10/13)     Calcium:  11.4 (10/14) -> 10.3 (10/16) -> 9.8 (10/19) -> 10.6 (10/21) -> 10.3 (10/23)     Albumin:  4.3 (10/14) -->  4.3 (10/16) -> 4.1 (10/17) -> 4.2 (10/19) -> 4.5 (10/21) -> 4.3 (10/23)      Mental Status Exam:   Oriented to: Oriented to self only  General:  Awake and Alert  Appearance:  appears stated age, Grooming is adequate, and Dressed in his own clothes  Behavior/Attitude:  Engaged   Eye Contact: Appropriate   Psychomotor: No evidence of tics, dystonia, or tardive dyskinesia  no catatonia present  Speech:  appropriate volume/tone, talkative, and spontaneous , apraxia of speech  Language: Fluent in English with appropriate syntax and vocabulary.  Mood:  \"good\"  Affect:  labile, blunted  Thought Process:  perseverative, tangential, and confused  Thought Content:    delusion of confusion . Patient denies paranoia  Associations:  loose  Insight:  impaired   Judgment:  impaired   Impulse control: limited  Attention Span:   mildly decreased  Concentration:   distractible  Recent and Remote Memory:   remote memory intact, recent memory impaired  Fund of Knowledge: average  Muscle Strength and Tone: normal  Gait and Station: Normal     Psychiatric Assessment     Estevan Aaron is a 64 year old male with previous psychiatric diagnoses of GEORGINA admitted from the ER on 10/12/2024 due to concern for HI and psychosis in the context of medical issues (hyperthyroidism, hypercalcemia), psychosocial stressors including with recent divorce and selling his home. This is the patient's first psychiatric hospitalization. The MSE " on admission was pertinent for a thought process which was perseverative, circumstantial, tangential, disorganized and tangential; with rambling and looseness of associations. Psychological contributions to presentation included lack of insight. Social factors contributing to presentation included isolation, recent divorce, selling his house, and moving from hotel to hotel. Biological contributions to presentation included a history of hyperparathyroidism with chronic hypercalcemia per charts as well as a history of methamphetamine use per collateral from ex-wife.     Per collateral from ex-wife, Santos has had paranoid ideations since they first met. He has always felt like people are out to get him or trying to rip him off. She says that he has had visual hallucinations (he will point things out that the rest of the family can't see) for a long time, but the family would just go along with him to avoid making him angry. This timeline and presentation could be consistent with diagnosis of paranoid personality disorder. Things began to worsen around the beginning of the COVID pandemic, when Santos became more isolated and the family started to notice cognitive issues such as impaired memory. Other things that could be contributing to his presentation is hyperparathyroidism with hypercalcemia. However, patient's calcium returned to normal limits on 10/16, and patient continues to have disorganized behavior unrelated to calcium elevation, so likely this is not a significant contributing factor to patient's presentation.      Currently, Santos is at times disoriented, but easily re-directable. Presents with some difficulty of word articulation, which contributes to his frustration when interacting with psych team, as he finds it difficult to explain himself. He is able to take care of his ADLs without much assistance and he is mostly independent in the unit. On the other hand, his confused behavior tend to increase in the  evenings, seemingly related to  Neurocognitive Disorder diagnosis, and this could evolved to be his new baseline. Either way, Santos does not present as a danger to himself or other so his SIO was discontinued. Santos does not present with any new episodes of physical agitation and is able to attend groups and interact with peers without major altercations. Patient currently is stable with current neuroleptic dose, patient probably wont benefit from any modification in current therapy.     Family care meeting 01/02 with psych team: update on current availability of placement options and Santos's current presentation. Family aware of Santos's level of functionality with ADLs, besides  disorientation episodes at evenings, that only requires minimal intervention for redirection. Still pending visits to possible memory care placements by family.    Santos appeared more confused throughout the day, different from his usual baseline, per staff report. No changed in vital signs but concern for acute delirium. UA was requested.    Goal of treatment:  Procure a memory care placement.      Psychiatric Plan by Diagnosis     Today's changes:  - No medication changes     # Major Neurocognitive Disorder  1. Medications:  - Olanzapine 5 mg at bedtime     3. Additional Plans:  - Patient will be treated in therapeutic milieu with appropriate individual and group therapies as described  - Pending memory facility placement.      # Unspecified anxiety vs Generalized Anxiety Disorder  - Monitor for symptoms.  - Fluoxetine held due to suspicion of ongoing manic symptoms     Psychiatric Hospital Course:      Estevan Aaron was admitted to [facility] on a 72 hour hold.   Medications:  PTA fluoxetine was held due to concern for worsening of zelalem   New medications started at the time of admission include Zyprexa.   Increased olanzapine 10 mg at bedtime was to 10 mg BID (10/14)   Increased olanzapine 10 mg BID to 10 mg during day and 15 mg at bedtime  (10/15)  10/21: increased olanzapine pm dose from 15 to 20 mg  10/23: started melatonin 3 mg at 7 pm to help patient with circadian rhythm as he has been staying up throughout the night and sleeping a lot during the day and there is some concern for delirium   10/24: consulted anesthesia for MRI brain   10/28: MRI brain complete, decrease AM olanzapine from 10 to 5 mg  10/29: Morning olanzapine decreased to 2.5 mg, evening olanzapine decreased to 15 mg   11/1: Decreased evening olanzapine to 10 mg   11/4: Decreased evening olanzapine to 7.5 mg   11/5: Decreased evening olanzapine to 5 mg   11/11: Moved morning dose of olanzapine to the afternoon as patient appears more agitated in the afternoons/evenings  11/21: Started memantine 5 mg daily   11/26: Discontinued olanzapine 2.5 mg during the afternoon  12/2:   Discontinued memantine 5 mg, daily    The risks, benefits, alternatives, and side effects were discussed and understood by the patient and other caregivers.     Medical Assessment and Plan     Medical diagnoses to be addressed this admission:    # Hip Pain  - New right hip pain, and mild pain in multiple joints. Moderate response to topical antiinflammatory gel and lidocaine patch.   - Medicine consulted for recommendations 12/20    # CHF  # Hypertension  - Continue PTA medications  Furosemide 40 mg daily  Lisinopril 40 mg daily  Metoprolol 50 mg daily  Amlodipine 10 mg daily  Clonidine 0.1 mg BID  - Pitting edema in lower extremities, not painful 3+: Medicine consulted for recommendations 12/20  - Weight gain of about 8 pounds in about 2 months currently 249lb as of 01/07/25    # Hyperlipidemia  - Continue PTA Atorvastatin 40 mg     # Primary Hyperparathyroidism  # Hypercalcemia, hypophosphoremia   Increased Ca level to 10.9 and decreased Ph level to 2.3 in the ED   - Consulted endocrinology, who started cinacalcet 30 mg BID on 10/13  - 10/16 endocrinology recommended continuing to trend calcium and albumin to  make sure patient does not become hypocalcemic and recommended holding cinacalcet   - 10/17, calcium and albumin wnl, endo recommended cinacalcet 30 mg once daily   - 10/21, per endo continue cincaclcet and recheck calcium and albumin on October 24  - 10/23: per endo, patient needs to follow up outpatient for further management of hyperparathyroidism      Medical course: Patient was physically examined by the ED prior to being transferred to the unit and was found to be medically stable and appropriate for admission.      Consults: Psychiatry, Endocrinology (follow-up of hyperthyroidism / hypercalcemia and hypertension), neurology     Checklist     Legal Status: Committed   [facility] meds: Haldol, Clozaril, Risperdal, Invega, Zyprexa, Seroquel, Abilify    Safety Assessment:   Behavioral Orders   Procedures    Code 1 - Restrict to Unit    Routine Programming     As clinically indicated    Status 15     Every 15 minutes.       Risk Assessment:  Risk for harm is low  Risk factors: impulsive and past behaviors  Protective factors: family      SIO: No    Disposition: Pending stabilization, medication optimization, & development of a safe discharge plan.     Attestations     This patient was seen and discussed with my attending physician.  Evelia Grimm DO  PGY-1 Psychiatry Resident     Attestation:  This patient has been seen and evaluated by me, Pete Smith MD.  I have discussed this patient with the house staff team including the resident and/or medical student and I agree with the findings and plan in this note.    I have reviewed today's vital signs, medications, labs and imaging. Pete Smith MD , PhD.

## 2025-01-10 NOTE — PLAN OF CARE
BEH IP Unit Acuity Rating Score (UARS)  Patient is given one point for every criteria they meet.    CRITERIA SCORING   On a 72 hour hold, court hold, committed, stay of commitment, or revocation. 1    Patient LOS on BEH unit exceeds 20 days. 1  LOS: 90   Patient under guardianship, 55+, otherwise medically complex, or under age 11. 1   Suicide ideation without relief of precipitating factors. 0   Current plan for suicide. 0   Current plan for homicide. 0   Imminent risk or actual attempt to seriously harm another without relief of factors precipitating the attempt. 1   Severe dysfunction in daily living (ex: complete neglect for self care, extreme disruption in vegetative function, extreme deterioration in social interactions). 1   Recent (last 7 days) or current physical aggression in the ED or on unit. 0   Restraints or seclusion episode in past 72 hours. 0   Recent (last 7 days) or current verbal aggression, agitation, yelling, etc., while in the ED or unit. 0   Active psychosis. 1   Need for constant or near constant redirection (from leaving, from others, etc).  0   Intrusive or disruptive behaviors. 1   Patient requires 3 or more hours of individualized nursing care per 8-hour shift (i.e. for ADLs, meds, therapeutic interventions). 1   TOTAL 8

## 2025-01-10 NOTE — PLAN OF CARE
Problem: Sleep Disturbance  Goal: Adequate Sleep/Rest  Outcome: Progressing    Pt appears to have slept for 5.75 hours. He slept intermittently in his room and the lounge area. Pt refused redirection to sleep in his room for the most part of the shift. Appears to be slightly confused, but easily redirectable. He denies pain, anxiety, depression, and SI/SIB. No PRN medications were given. 15 minutes safety checks were in place. Staff will continue to offer support to pt.

## 2025-01-10 NOTE — PLAN OF CARE
Problem: Adult Behavioral Health Plan of Care  Goal: Plan of Care Review  Outcome: Progressing  Flowsheets (Taken 1/9/2025 1620)  Patient Agreement with Plan of Care: agrees     Problem: Psychotic Signs/Symptoms  Goal: Improved Behavioral Control (Psychotic Signs/Symptoms)  Outcome: Progressing   Goal Outcome Evaluation:    Plan of Care Reviewed With: patient      Pt was intermittently visible in the milieu, alert and able to make needs known, he's been hyper verbal, affect was flat and blunted, mood is labile, he was watching television with peers and falling a sleep. He paced the halls, denied any physical pain, denied all psych symptoms. Pt was compliant with medication with no issues, food/fluid intake was adequate, hygiene ok, VSWDL.

## 2025-01-10 NOTE — PLAN OF CARE
Team Note Due:  Monday    Assessment/Intervention/Current Symtoms and Care Coordination:  Chart review and met with team, discussed pt progress, symptomology, and response to treatment.  Discussed the discharge plan and any potential impediments to discharge. Clifford Aaron is currently emergency guardian/conservator until 01/20/2025.    Met with pt and Ka to complete MnCHOICE assessment.    Discharge Plan or Goal:  Memory care facility     Barriers to Discharge:  Patient requires further psychiatric stabilization due to current symptomology, medication management with changes subject to provider, coordination with outside supports, and aftercare planning. Pt is under civil commitment.     Referral Status:    Memory Care facilities currently in process:  [facility]. Tour completed 11/27.  [facility]. Tour completed 11/29.  [facility]. Tour completed 11/30. Per Clifford on 1/8, she would like to move forward with a referral. Records sent 1/8.  [facility]. Tour scheduled 1/8/25. Have immediate openings and will accept EW.    Memory Care facilities pending family review:  [facility] [place].  [facility].  [facility].  [facility].  [facility].    Memory Care facilities declined:  [facility] (private pay only, no EW).  [facility] (private pay only, no EW).  [facility] (no openings).  [facility]. Tour completed 11/29. Records sent 12/2/24. Declined 12/19/24 (don't feel they are an appropriate facility for pt's needs).  Lorraine (): [email or web address]; [phone]  Isatu (director of nursing): [email or web address]  [facility] [place].  Denied 12/26 (concerned about dementia with psychosis, history of hallucinations, high  elopement risk, still on 1:1).  Nikki Noel: [email or web address]; Office [phone], Fax [phone]   [facility]. Not accepting EW as of 1/7/25.    Legal Status:  MI Commitment with [facility]  File Number: [id number]  Start and expiration date of commitment: 10/24/24 - 04/24/25    [facility] meds: Haldol, Clozaril, Risperdal, Invega, Zyprexa, Seroquel, Abilify    PPS/CM:  Shelby Chowdary: [phone]  [email or web address]    Contacts:  Maria C Aaron (Daughter): [phone]   Clifford Aaron (ex-wife): [phone]     No Del Angel (guardianship/conservatorship ): [phone]  [email or web address]    eDrrell Duff (MnCHOICE ): [phone]  [email or web address]     Upcoming Meetings and Dates/Important Information and next steps:  Follow up with family regarding list of Memory Care facilities  Discharge planning when appropriate  Pt does not qualify for MA per financial counselor on 11/8/2024. Pt will likely privately pay for Memory Care until he qualifies for MA/waivers in the future.    Provisional Discharge and Change of Status needed at discharge

## 2025-01-10 NOTE — PLAN OF CARE
"Pt has spent most of the shift sitting calmly in the lounge, socializing with peers and watching TV.   Continues with a labile mood. Initially this morning he was quite irritable on approach. In response to MSE assessment questions, he responded \"why are you asking me all that?!\"   Intermittent confusion and confabulation-  \"What's going on now? Did I piss off that [place] lady?\"  \"What are we doing here? Some construction? We need to make a border.\"- Pt making hand gestures in the lounge as if he's working.   Charge Nurse and writer confronted pt about reported sexually inappropriate comments that he made last evening toward a female peer. Per pt-he has no recollection and denies.   A short while later, pt was heard summoning a female peer from his room- \"Hey come here.\"  New order received for acuity staff monitoring for redirection as needed due to the above behavior.    He took scheduled medications without incident. No prns given.   He denies any mental health sx or concerns. No agitation or aggressive behavior. No overt paranoia noted.    He denies pain or physical concerns. He appears to be eating and hydrating well.   VSS. /63 (BP Location: Left arm, Patient Position: Sitting, Cuff Size: Adult Large)   Pulse 61   Temp 98.4  F (36.9  C)   Resp 16   Wt 112.9 kg (248 lb 12.8 oz)   SpO2 94%   BMI 40.16 kg/m        Problem: Adult Behavioral Health Plan of Care  Goal: Plan of Care Review  Outcome: Progressing  Flowsheets (Taken 1/10/2025 0810)  Patient Agreement with Plan of Care: agrees     Problem: Psychotic Signs/Symptoms  Goal: Improved Mood Symptoms (Psychotic Signs/Symptoms)  Outcome: Progressing  Intervention: Optimize Emotion and Mood  Recent Flowsheet Documentation  Taken 1/10/2025 0810 by Lavinia Andrade, RN  Diversional Activity: television  Intervention: Optimize Emotion and Mood  Recent Flowsheet Documentation  Taken 1/10/2025 0810 by Lavinia Andrade, RN  Diversional Activity: " television   Goal Outcome Evaluation:    Plan of Care Reviewed With: patient                    Normal for race

## 2025-01-11 PROCEDURE — 250N000013 HC RX MED GY IP 250 OP 250 PS 637

## 2025-01-11 PROCEDURE — 124N000002 HC R&B MH UMMC

## 2025-01-11 RX ADMIN — OLANZAPINE 5 MG: 5 TABLET, ORALLY DISINTEGRATING ORAL at 20:33

## 2025-01-11 RX ADMIN — Medication 25 MG: at 20:32

## 2025-01-11 RX ADMIN — METOPROLOL SUCCINATE 50 MG: 50 TABLET, EXTENDED RELEASE ORAL at 09:21

## 2025-01-11 RX ADMIN — AMLODIPINE BESYLATE 10 MG: 5 TABLET ORAL at 09:20

## 2025-01-11 RX ADMIN — ATORVASTATIN CALCIUM 40 MG: 20 TABLET, FILM COATED ORAL at 09:19

## 2025-01-11 RX ADMIN — FUROSEMIDE 40 MG: 40 TABLET ORAL at 09:21

## 2025-01-11 RX ADMIN — CLONIDINE HYDROCHLORIDE 0.1 MG: 0.1 TABLET ORAL at 09:20

## 2025-01-11 RX ADMIN — SENNOSIDES AND DOCUSATE SODIUM 1 TABLET: 50; 8.6 TABLET ORAL at 09:19

## 2025-01-11 RX ADMIN — Medication 1 PATCH: at 09:24

## 2025-01-11 RX ADMIN — MELATONIN TAB 3 MG 3 MG: 3 TAB at 20:33

## 2025-01-11 RX ADMIN — LISINOPRIL 40 MG: 10 TABLET ORAL at 09:19

## 2025-01-11 RX ADMIN — CINACALCET 30 MG: 30 TABLET ORAL at 09:19

## 2025-01-11 RX ADMIN — CLONIDINE HYDROCHLORIDE 0.1 MG: 0.1 TABLET ORAL at 20:32

## 2025-01-11 ASSESSMENT — ACTIVITIES OF DAILY LIVING (ADL)
ADLS_ACUITY_SCORE: 66
DRESS: INDEPENDENT
ADLS_ACUITY_SCORE: 66
ORAL_HYGIENE: INDEPENDENT
ADLS_ACUITY_SCORE: 66
HYGIENE/GROOMING: INDEPENDENT
ADLS_ACUITY_SCORE: 66

## 2025-01-11 NOTE — PLAN OF CARE
Problem: Adult Behavioral Health Plan of Care  Goal: Adheres to Safety Considerations for Self and Others  Outcome: Progressing  Intervention: Develop and Maintain Individualized Safety Plan  Recent Flowsheet Documentation  Taken 1/11/2025 1200 by Rocío Curry RN  Safety Measures: safety rounds completed  Intervention: Develop and Maintain Individualized Safety Plan  Recent Flowsheet Documentation  Taken 1/11/2025 1200 by Rocío Curry RN  Safety Measures: safety rounds completed  Goal: Absence of New-Onset Illness or Injury  Outcome: Progressing  Intervention: Identify and Manage Fall Risk  Recent Flowsheet Documentation  Taken 1/11/2025 1200 by Rocío Curry RN  Safety Measures: safety rounds completed  Goal: Optimized Coping Skills in Response to Life Stressors  Outcome: Progressing     Problem: Depression  Goal: Improved Mood  Outcome: Progressing     Problem: Suicidal Behavior  Goal: Suicidal Behavior is Absent or Managed  Outcome: Progressing   Goal Outcome Evaluation:    Plan of Care Reviewed With: patient      Pt is visible in the milieu. Sits [facility] area where he eats his meals. Pt is eating and drinking adequately. He is medication compliant.. Pt interacts with peers. Interactions are appropriate.  Pt was dismissive with assessment questions. He comes to desk to ask for candies. No behavioral concerns this shift.

## 2025-01-11 NOTE — PLAN OF CARE
Problem: Sleep Disturbance  Goal: Adequate Sleep/Rest  Outcome: Progressing       Pt appears to have slept for 5.75 hours. Pt was awake and sitting in the lounge area at the start of the shift. He later went to his room, slept intermittently in his room and the lounge area. Pt refused redirection to sleep in his room for the most part of the shift. Appears to be slightly confused, but easily redirectable. He denies pain, anxiety, depression, and SI/SIB. No PRN medications were given. 15 minutes safety checks were in place. Staff will continue to offer support to pt.

## 2025-01-11 NOTE — PLAN OF CARE
Problem: Suicidal Behavior  Goal: Suicidal Behavior is Absent or Managed  Outcome: Progressing  Flowsheets (Taken 1/10/2025 2953)  Mutually Determined Action Steps (Suicidal Behavior Absent/Managed): other (see comments)     Problem: Pain Acute  Goal: Optimal Pain Control and Function  Outcome: Progressing   Goal Outcome Evaluation:    Plan of Care Reviewed With: patient        Patient was unable to participate in assessment. He was alert, intermittently confused, able to communicate needs. Visible in milieu, mostly watching tv. He was social and interactive with peers. Labile, argumentative at times. No noted or reported sexual comments towards peers No signs of anxiety/depression. Medication compliant. ADLs WNL, he denied any pain/discomfort, no noted distress. Declined to eat supper.

## 2025-01-12 PROCEDURE — 250N000013 HC RX MED GY IP 250 OP 250 PS 637

## 2025-01-12 PROCEDURE — 250N000011 HC RX IP 250 OP 636: Mod: JZ

## 2025-01-12 PROCEDURE — 124N000002 HC R&B MH UMMC

## 2025-01-12 RX ADMIN — LISINOPRIL 40 MG: 10 TABLET ORAL at 09:12

## 2025-01-12 RX ADMIN — METOPROLOL SUCCINATE 50 MG: 50 TABLET, EXTENDED RELEASE ORAL at 09:12

## 2025-01-12 RX ADMIN — ATORVASTATIN CALCIUM 40 MG: 20 TABLET, FILM COATED ORAL at 09:12

## 2025-01-12 RX ADMIN — CINACALCET 30 MG: 30 TABLET ORAL at 09:18

## 2025-01-12 RX ADMIN — OLANZAPINE 10 MG: 10 INJECTION, POWDER, FOR SOLUTION INTRAMUSCULAR at 22:11

## 2025-01-12 RX ADMIN — Medication 1 PATCH: at 09:15

## 2025-01-12 RX ADMIN — AMLODIPINE BESYLATE 10 MG: 5 TABLET ORAL at 09:12

## 2025-01-12 RX ADMIN — FUROSEMIDE 40 MG: 40 TABLET ORAL at 09:12

## 2025-01-12 RX ADMIN — SENNOSIDES AND DOCUSATE SODIUM 1 TABLET: 50; 8.6 TABLET ORAL at 09:12

## 2025-01-12 RX ADMIN — CLONIDINE HYDROCHLORIDE 0.1 MG: 0.1 TABLET ORAL at 09:12

## 2025-01-12 ASSESSMENT — ACTIVITIES OF DAILY LIVING (ADL)
ADLS_ACUITY_SCORE: 66
ADLS_ACUITY_SCORE: 66
ADLS_ACUITY_SCORE: 76
ADLS_ACUITY_SCORE: 66
ADLS_ACUITY_SCORE: 66
ORAL_HYGIENE: INDEPENDENT
ADLS_ACUITY_SCORE: 66
ADLS_ACUITY_SCORE: 66
ADLS_ACUITY_SCORE: 76
ADLS_ACUITY_SCORE: 76
ADLS_ACUITY_SCORE: 66
ADLS_ACUITY_SCORE: 76
ADLS_ACUITY_SCORE: 66
ADLS_ACUITY_SCORE: 66
HYGIENE/GROOMING: INDEPENDENT
ADLS_ACUITY_SCORE: 66
ORAL_HYGIENE: INDEPENDENT
ADLS_ACUITY_SCORE: 76
ADLS_ACUITY_SCORE: 66
ADLS_ACUITY_SCORE: 76
ADLS_ACUITY_SCORE: 66
ADLS_ACUITY_SCORE: 66
DRESS: INDEPENDENT
HYGIENE/GROOMING: HANDWASHING;INDEPENDENT
ADLS_ACUITY_SCORE: 66
ADLS_ACUITY_SCORE: 76
ADLS_ACUITY_SCORE: 66
ADLS_ACUITY_SCORE: 66
DRESS: WITH ASSISTANCE;STREET CLOTHES

## 2025-01-12 NOTE — PLAN OF CARE
Goal Outcome Evaluation:    Plan of Care Reviewed With: patient      Problem: Adult Behavioral Health Plan of Care  Goal: Adheres to Safety Considerations for Self and Others  Outcome: Progressing  Intervention: Develop and Maintain Individualized Safety Plan  Recent Flowsheet Documentation  Taken 1/12/2025 1300 by Rocío Curry RN  Safety Measures:   safety rounds completed   environmental rounds completed  Intervention: Develop and Maintain Individualized Safety Plan  Recent Flowsheet Documentation  Taken 1/12/2025 1300 by Rocío Curry RN  Safety Measures:   safety rounds completed   environmental rounds completed  Goal: Optimized Coping Skills in Response to Life Stressors  Outcome: Progressing     Problem: Depression  Goal: Improved Mood  Outcome: Progressing    Pt continues to have the same presentation as that of previous days. He sits by the [facility] area, watches TV with peers, interacts with them and compliant with medication. Pt is eating and drinking adequately.  Pt tried to make conversation with this staff about some vague thing that happened last night. Pt appear confused and cannot fully verbalize what he wants to say.  Pt was intrusive when another pt was crying. Pt was advised to focus on himself and his journey to getting better. Pt was receptive to redirection.

## 2025-01-12 NOTE — PLAN OF CARE
Problem: Sleep Disturbance  Goal: Adequate Sleep/Rest  Outcome: Progressing    Pt appears to have slept for 5.25 hours.  Pt was asleep in his room at the start of the shift. He slept intermittently in his room and the lounge area. Pt refused redirection to sleep in his room for the most part of the shift. He denies pain, anxiety, depression, and SI/SIB. No PRN medications were given. 15 minutes safety checks were in place. Staff will continue to offer support to pt.

## 2025-01-12 NOTE — PLAN OF CARE
"  Problem: Adult Behavioral Health Plan of Care  Goal: Absence of New-Onset Illness or Injury  Intervention: Identify and Manage Fall Risk  Recent Flowsheet Documentation  Taken 1/11/2025 2800 by Torito Cruz RN  Safety Measures:   safety rounds completed   environmental rounds completed     Problem: Suicidal Behavior  Goal: Suicidal Behavior is Absent or Managed  Outcome: Progressing  Flowsheets (Taken 1/11/2025 0434)  Mutually Determined Action Steps (Suicidal Behavior Absent/Managed): other (see comments)     Problem: Depression  Goal: Improved Mood  Outcome: Progressing   Goal Outcome Evaluation:    Plan of Care Reviewed With: patient Plan of Care Reviewed With: patient    Overall Patient Progress: improvingOverall Patient Progress: improving      Alert, intermittent confused, able to communicate needs. Patient was intermittently visible in milieu, he spent most of the shift in his room napping. Social and interactive with peers and staff members. He denied any anxiety or depression, he denied suicidal/homicidal ideation. Anger, agitated when approach for HS medication administration. Yelling to the point of disrupting milieu, declined medication \"you people are addicts\" Threatened to strike writerStefan BANKS team called for Ortega medication administration, patient agreed to taking all HS medications orally.            "

## 2025-01-13 LAB — SARS-COV-2 RNA RESP QL NAA+PROBE: NEGATIVE

## 2025-01-13 PROCEDURE — 250N000013 HC RX MED GY IP 250 OP 250 PS 637

## 2025-01-13 PROCEDURE — 124N000002 HC R&B MH UMMC

## 2025-01-13 PROCEDURE — 99231 SBSQ HOSP IP/OBS SF/LOW 25: CPT | Mod: GC | Performed by: PSYCHIATRY & NEUROLOGY

## 2025-01-13 PROCEDURE — 87635 SARS-COV-2 COVID-19 AMP PRB: CPT

## 2025-01-13 RX ADMIN — CLONIDINE HYDROCHLORIDE 0.1 MG: 0.1 TABLET ORAL at 09:00

## 2025-01-13 RX ADMIN — LISINOPRIL 40 MG: 10 TABLET ORAL at 08:59

## 2025-01-13 RX ADMIN — FUROSEMIDE 40 MG: 40 TABLET ORAL at 09:00

## 2025-01-13 RX ADMIN — AMLODIPINE BESYLATE 10 MG: 5 TABLET ORAL at 09:00

## 2025-01-13 RX ADMIN — MELATONIN TAB 3 MG 3 MG: 3 TAB at 20:26

## 2025-01-13 RX ADMIN — METOPROLOL SUCCINATE 50 MG: 50 TABLET, EXTENDED RELEASE ORAL at 09:00

## 2025-01-13 RX ADMIN — Medication 25 MG: at 20:26

## 2025-01-13 RX ADMIN — ATORVASTATIN CALCIUM 40 MG: 20 TABLET, FILM COATED ORAL at 08:59

## 2025-01-13 RX ADMIN — CINACALCET 30 MG: 30 TABLET ORAL at 08:59

## 2025-01-13 RX ADMIN — SENNOSIDES AND DOCUSATE SODIUM 1 TABLET: 50; 8.6 TABLET ORAL at 09:00

## 2025-01-13 RX ADMIN — CLONIDINE HYDROCHLORIDE 0.1 MG: 0.1 TABLET ORAL at 20:26

## 2025-01-13 RX ADMIN — Medication 1 PATCH: at 08:59

## 2025-01-13 RX ADMIN — OLANZAPINE 5 MG: 5 TABLET, ORALLY DISINTEGRATING ORAL at 20:26

## 2025-01-13 ASSESSMENT — ACTIVITIES OF DAILY LIVING (ADL)
ADLS_ACUITY_SCORE: 76
ADLS_ACUITY_SCORE: 66
ADLS_ACUITY_SCORE: 76
DRESS: INDEPENDENT
DRESS: INDEPENDENT
ADLS_ACUITY_SCORE: 76
ADLS_ACUITY_SCORE: 66
ADLS_ACUITY_SCORE: 76
ADLS_ACUITY_SCORE: 66
HYGIENE/GROOMING: INDEPENDENT
ADLS_ACUITY_SCORE: 66
ORAL_HYGIENE: INDEPENDENT
ADLS_ACUITY_SCORE: 76
ORAL_HYGIENE: INDEPENDENT
ADLS_ACUITY_SCORE: 66
ADLS_ACUITY_SCORE: 66
ADLS_ACUITY_SCORE: 76
HYGIENE/GROOMING: INDEPENDENT
ADLS_ACUITY_SCORE: 66
ADLS_ACUITY_SCORE: 76
ADLS_ACUITY_SCORE: 66
ADLS_ACUITY_SCORE: 76
ADLS_ACUITY_SCORE: 76
ADLS_ACUITY_SCORE: 66
ADLS_ACUITY_SCORE: 66
ADLS_ACUITY_SCORE: 76

## 2025-01-13 NOTE — PROVIDER NOTIFICATION
01/13/25 1002   Individualization/Patient Specific Goals   Patient Personal Strengths resourceful;resilient;positive vocational history;ability to maintain sobriety   Patient Vulnerabilities housing insecurity;lacks insight into illness;poor impulse control   Interprofessional Rounds   Summary Discussed patient progress and barriers to discharge.   Participants nursing;CTC;psychiatrist;other (see comments)   Behavioral Team Discussion   Participants Dr. Smith; Dr. Grimm; Rocío Curry RN; Kylee Becerra MA [facility]; medical students   Progress Pt has progressed   Anticipated length of stay 60+ days   Continued Stay Criteria/Rationale Medication management; symptom stabilization; care coordination   Medical/Physical See H&P   Precautions See below   Plan Psychiatric assessment/Medication management. Therapeutic Milieu. Individual care planning and after care planning. Patient to participate in unit groups and activities. Individual and group support on unit.   Safety Plan Completed by unit therapist prior to discharge   Anticipated Discharge Disposition another healthcare facility     PRECAUTIONS AND SAFETY    Behavioral Orders   Procedures    Code 1 - Restrict to Unit    Routine Programming     As clinically indicated    Sexual precautions     Reportedly making crude sexual comments toward female peers.    Status 15     Every 15 minutes.       Safety  Safety WDL: WDL  Patient Location: [facility]  Observed Behavior: calm  Observed Behavior (Comment): Watching tv  Airway Safety Measures: other (see comments)  Safety Measures: environmental rounds completed, safety rounds completed  Diversional Activity: television  De-Escalation Techniques: appropriate behavior reinforced  Suicidality: Status 15  Assault: private room  Elopement Assessment: Loitering near exit doors, Statements about wanting to leave  Elopement Interventions: status 15  Sexual: status 15

## 2025-01-13 NOTE — PLAN OF CARE
Problem: Adult Behavioral Health Plan of Care  Goal: Adheres to Safety Considerations for Self and Others  Outcome: Progressing  Intervention: Develop and Maintain Individualized Safety Plan  Recent Flowsheet Documentation  Taken 1/13/2025 1300 by Rocío Curry RN  Safety Measures:   environmental rounds completed   safety rounds completed  Intervention: Develop and Maintain Individualized Safety Plan  Recent Flowsheet Documentation  Taken 1/13/2025 1300 by Rocío Curry RN  Safety Measures:   environmental rounds completed   safety rounds completed  Goal: Optimized Coping Skills in Response to Life Stressors  Outcome: Progressing     Problem: Psychotic Signs/Symptoms  Goal: Improved Behavioral Control (Psychotic Signs/Symptoms)  Outcome: Progressing     Problem: Depression  Goal: Improved Mood  Outcome: Progressing     Problem: Suicidal Behavior  Goal: Suicidal Behavior is Absent or Managed  Outcome: Progressing   Goal Outcome Evaluation:    Plan of Care Reviewed With: patient    Pt mostly sat on his favorite spot at the [facility] area. Pt is eating and drinking adequately. He declined offer of shower. Pt denied anxiety, SI and hallucinations. He endorsed depression because he wants to go home. Pt was observed interacting with peers . Interactions were appropriate. Pt said that he got the shot last night and asked him why, he said that sometimes I get confused. Pt snoozing  very now and then on his chair.

## 2025-01-13 NOTE — PLAN OF CARE
Problem: Adult Behavioral Health Plan of Care  Goal: Plan of Care Review  Outcome: Not Progressing  Flowsheets  Taken 1/12/2025 2149  Plan of Care Reviewed With: patient  Overall Patient Progress: declining  Patient Agreement with Plan of Care: agrees  Taken 1/12/2025 1619  Patient Agreement with Plan of Care: agrees     Problem: Adult Behavioral Health Plan of Care  Goal: Absence of New-Onset Illness or Injury  Intervention: Identify and Manage Fall Risk  Recent Flowsheet Documentation  Taken 1/12/2025 1619 by Torito Cruz RN  Safety Measures:   environmental rounds completed   safety rounds completed     Problem: Depression  Goal: Improved Mood  Outcome: Progressing   Goal Outcome Evaluation:    Plan of Care Reviewed With: patient Plan of Care Reviewed With: patient    Overall Patient Progress: decliningOverall Patient Progress: declining     Alert, intermittently confused. Visible in milieu, social and interactive with peers. He spent most of his time in the lounge watching tv. He denied any anxiety or depression, he denied SI/HI. Adequate food and fluid intake, ADLs WNL. Patient denied any pain or discomfort. He declined all PO scheduled medication at HS. DARREN team called for Ortega medication administration. Physical hold at 2215, Zyprexa IM administered with resistance. No apparent injuries during hold, ROM WNL, no pain.

## 2025-01-13 NOTE — PROGRESS NOTES
"  ----------------------------------------------------------------------------------------------------------  [facility]  Psychiatry Progress Note  Hospital Day #93     Interim History:     The patient's care was discussed with the treatment team and chart notes were reviewed.    Identifier: Estevan Aaron is a 64 year old male with previous psychiatric diagnoses of  generalized anxiety disorder, Neurocognitive disorder, admitted from the ED 10/12/2024 due to concern for HI and psychosis in the context of medical issues (hyperthyroidism, hypercalcemia) psychosocial stressors including family dynamics with recent possible divorce.    Sleep: 3.75 hours (01/13/25 0605)  Scheduled medications: Took all scheduled medications as prescribed  Psychiatric PRN medications:       Staff Report:   Was visible in the milieu, often watching television and interacting with peers. Continues to have poor boundaries with certain peers but redirectable. Denies all mental health concerns.     See staff notes for more information     Subjective:     Patient Interview:  Santos was interviewed in the milieu. He notes that his weekend went \"well\". Has no acute concerns. Says he looks forward the [employer] game today. Denies having made inappropriate comments to other peers (as reported by nursing staff).     ROS:  See above     Objective:     Vitals:  /75 (BP Location: Right arm, Patient Position: Sitting, Cuff Size: Adult Large)   Pulse 60   Temp 98.3  F (36.8  C) (Temporal)   Resp 18   Wt 112.9 kg (248 lb 12.8 oz)   SpO2 94%   BMI 40.16 kg/m      Allergies:  No Known Allergies    Current Medications:  Scheduled:  Current Facility-Administered Medications   Medication Dose Route Frequency Provider Last Rate Last Admin    acetaminophen (TYLENOL) tablet 650 mg  650 mg Oral Q4H PRN Nikki Caceres MD   650 mg at 01/09/25 0228    alum & mag hydroxide-simethicone (MAALOX) suspension 30 mL  " 30 mL Oral Q4H PRN Nikki Caceres MD   30 mL at 10/17/24 0837    amLODIPine (NORVASC) tablet 10 mg  10 mg Oral Daily Presley Barrera MD   10 mg at 01/12/25 0912    atorvastatin (LIPITOR) tablet 40 mg  40 mg Oral Daily Nikki Caceres MD   40 mg at 01/12/25 0912    cholecalciferol (VITAMIN D3) capsule 1,250 mcg  1,250 mcg Oral Q7 Days Evelia Grimm DO   1,250 mcg at 01/07/25 1300    cinacalcet (SENSIPAR) tablet 30 mg  30 mg Oral Daily Presley Barrera MD   30 mg at 01/12/25 0918    cloNIDine (CATAPRES) tablet 0.1 mg  0.1 mg Oral BID Presley Barrera MD   0.1 mg at 01/12/25 0912    diclofenac (VOLTAREN) 1 % topical gel 2 g  2 g Topical 4x Daily PRN Rocío Lopez MD   2 g at 12/22/24 2032    furosemide (LASIX) tablet 40 mg  40 mg Oral Daily Presely Barrera MD   40 mg at 01/12/25 0912    gabapentin (NEURONTIN) capsule 100 mg  100 mg Oral Q6H PRN Nikki Caceres MD   100 mg at 12/24/24 0046    hydrocortisone (CORTAID) 0.5 % cream   Topical Daily PRN Savi Chamorro MD        Lidocaine (LIDOCARE) 4 % Patch 1 patch  1 patch Transdermal Q24H PRN Rocío Lopez MD   1 patch at 12/22/24 2023    lisinopril (ZESTRIL) tablet 40 mg  40 mg Oral Daily Presley Barrera MD   40 mg at 01/12/25 0912    melatonin tablet 3 mg  3 mg Oral At Bedtime Presley Barrera MD   3 mg at 01/11/25 2033    metoprolol succinate ER (TOPROL XL) 24 hr tablet 50 mg  50 mg Oral Daily Presley Barrera MD   50 mg at 01/12/25 0912    nicotine (NICODERM CQ) 14 MG/24HR 24 hr patch 1 patch  1 patch Transdermal Daily Evelia Grimm DO   1 patch at 01/12/25 0915    nicotine (NICORETTE) gum 2 mg  2 mg Buccal Q1H PRN González Garcia MD   2 mg at 10/24/24 1254    OLANZapine zydis (zyPREXA) ODT tab 5 mg  5 mg Oral At Bedtime González Garcia MD   5 mg at 01/11/25 2033    Or    OLANZapine (zyPREXA) injection 10 mg  10 mg Intramuscular At Bedtime González Garcia MD   10 mg at 01/12/25 2211    OLANZapine  (zyPREXA) tablet 5 mg  5 mg Oral TID PRN Evelia Grimm DO   5 mg at 01/05/25 1645    Or    OLANZapine (zyPREXA) injection 5 mg  5 mg Intramuscular TID PRN Evelia Grimm DO        polyethylene glycol (MIRALAX) Packet 17 g  17 g Oral Daily PRN Nikki Caceres MD        senna-docusate (SENOKOT-S/PERICOLACE) 8.6-50 MG per tablet 1 tablet  1 tablet Oral Daily Evelia Grimm DO   1 tablet at 01/12/25 0912    traZODone (DESYREL) half-tab 25 mg  25 mg Oral At Bedtime Rocío oLpez MD   25 mg at 01/11/25 2032    traZODone (DESYREL) half-tab 25 mg  25 mg Oral At Bedtime PRN Evelia Grimm DO   25 mg at 12/24/24 0046       PRN:  Current Facility-Administered Medications   Medication Dose Route Frequency Provider Last Rate Last Admin    acetaminophen (TYLENOL) tablet 650 mg  650 mg Oral Q4H PRN Nikki Caceres MD   650 mg at 01/09/25 0228    alum & mag hydroxide-simethicone (MAALOX) suspension 30 mL  30 mL Oral Q4H PRN Nikki Caceres MD   30 mL at 10/17/24 0837    amLODIPine (NORVASC) tablet 10 mg  10 mg Oral Daily Presley Barrera MD   10 mg at 01/12/25 0912    atorvastatin (LIPITOR) tablet 40 mg  40 mg Oral Daily Nikki Caceres MD   40 mg at 01/12/25 0912    cholecalciferol (VITAMIN D3) capsule 1,250 mcg  1,250 mcg Oral Q7 Days Evelia Grimm DO   1,250 mcg at 01/07/25 1300    cinacalcet (SENSIPAR) tablet 30 mg  30 mg Oral Daily Presley Barrera MD   30 mg at 01/12/25 0918    cloNIDine (CATAPRES) tablet 0.1 mg  0.1 mg Oral BID Presley Barrera MD   0.1 mg at 01/12/25 0912    diclofenac (VOLTAREN) 1 % topical gel 2 g  2 g Topical 4x Daily PRN Rocío Lopez MD   2 g at 12/22/24 2032    furosemide (LASIX) tablet 40 mg  40 mg Oral Daily Presley Barrera MD   40 mg at 01/12/25 0912    gabapentin (NEURONTIN) capsule 100 mg  100 mg Oral Q6H PRN Nikki Caceres MD   100 mg at 12/24/24 0046    hydrocortisone (CORTAID) 0.5 % cream   Topical Daily PRN Savi Chamorro MD         Lidocaine (LIDOCARE) 4 % Patch 1 patch  1 patch Transdermal Q24H PRN Rocío Lopez MD   1 patch at 12/22/24 2023    lisinopril (ZESTRIL) tablet 40 mg  40 mg Oral Daily Presley Barrera MD   40 mg at 01/12/25 0912    melatonin tablet 3 mg  3 mg Oral At Bedtime Presley Barrera MD   3 mg at 01/11/25 2033    metoprolol succinate ER (TOPROL XL) 24 hr tablet 50 mg  50 mg Oral Daily Presley Barrera MD   50 mg at 01/12/25 0912    nicotine (NICODERM CQ) 14 MG/24HR 24 hr patch 1 patch  1 patch Transdermal Daily Evelia Grimm DO   1 patch at 01/12/25 0915    nicotine (NICORETTE) gum 2 mg  2 mg Buccal Q1H PRN González Garcia MD   2 mg at 10/24/24 1254    OLANZapine zydis (zyPREXA) ODT tab 5 mg  5 mg Oral At Bedtime González Garcia MD   5 mg at 01/11/25 2033    Or    OLANZapine (zyPREXA) injection 10 mg  10 mg Intramuscular At Bedtime González Garcia MD   10 mg at 01/12/25 2211    OLANZapine (zyPREXA) tablet 5 mg  5 mg Oral TID PRN Evelia Grimm DO   5 mg at 01/05/25 1645    Or    OLANZapine (zyPREXA) injection 5 mg  5 mg Intramuscular TID PRN Evelia Grimm DO        polyethylene glycol (MIRALAX) Packet 17 g  17 g Oral Daily PRN Nikki Caceres MD        senna-docusate (SENOKOT-S/PERICOLACE) 8.6-50 MG per tablet 1 tablet  1 tablet Oral Daily Evelia Grimm DO   1 tablet at 01/12/25 0912    traZODone (DESYREL) half-tab 25 mg  25 mg Oral At Bedtime Rocío Lopez MD   25 mg at 01/11/25 2032    traZODone (DESYREL) half-tab 25 mg  25 mg Oral At Bedtime PRN Evelia Grimm DO   25 mg at 12/24/24 0046     Labs and Imaging:  Data this admission:  - CBC unremarkable  - CMP unremarkable  - TSH normal  - UDS negative  - Vit D low  - Hgb A1c 5.9 (10/13/24)  - Lipids unremarkable  - Vit B12 normal  - Folate normal  - Urinalysis unremarkable  - EKG normal sinus rhythm, QTc 390 ms  - Head CT showed no acute changes  - HIV non reactive  - Treponema antibody non reactive  - ESR wnl   - Ceruloplasmin wnl  "  - BOOGIE negative  - Lyme negative      Parathyroid hormone:   85 (10/13)     Calcium:  11.4 (10/14) -> 10.3 (10/16) -> 9.8 (10/19) -> 10.6 (10/21) -> 10.3 (10/23)     Albumin:  4.3 (10/14) -->  4.3 (10/16) -> 4.1 (10/17) -> 4.2 (10/19) -> 4.5 (10/21) -> 4.3 (10/23)      Mental Status Exam:   Oriented to: Oriented to self only  General:  Awake and Alert  Appearance:  appears stated age, Grooming is adequate, and Dressed in his own clothes  Behavior/Attitude:  Engaged   Eye Contact: Appropriate   Psychomotor: No evidence of tics, dystonia, or tardive dyskinesia  no catatonia present  Speech:  appropriate volume/tone, talkative, and spontaneous , apraxia of speech  Language: Fluent in English with appropriate syntax and vocabulary.  Mood:  \"good\"  Affect:  labile, blunted  Thought Process:  perseverative, tangential, and confused  Thought Content:    delusion of confusion . Patient denies paranoia  Associations:  loose  Insight:  impaired   Judgment:  impaired   Impulse control: limited  Attention Span:   mildly decreased  Concentration:   distractible  Recent and Remote Memory:   remote memory intact, recent memory impaired  Fund of Knowledge: average  Muscle Strength and Tone: normal  Gait and Station: Normal     Psychiatric Assessment     Estevan Aaron is a 64 year old male with previous psychiatric diagnoses of GEORGINA admitted from the ER on 10/12/2024 due to concern for HI and psychosis in the context of medical issues (hyperthyroidism, hypercalcemia), psychosocial stressors including with recent divorce and selling his home. This is the patient's first psychiatric hospitalization. The MSE on admission was pertinent for a thought process which was perseverative, circumstantial, tangential, disorganized and tangential; with rambling and looseness of associations. Psychological contributions to presentation included lack of insight. Social factors contributing to presentation included isolation, recent divorce, selling " his house, and moving from hotel to hotel. Biological contributions to presentation included a history of hyperparathyroidism with chronic hypercalcemia per charts as well as a history of methamphetamine use per collateral from ex-wife.     Per collateral from ex-wife, Santos has had paranoid ideations since they first met. He has always felt like people are out to get him or trying to rip him off. She says that he has had visual hallucinations (he will point things out that the rest of the family can't see) for a long time, but the family would just go along with him to avoid making him angry. This timeline and presentation could be consistent with diagnosis of paranoid personality disorder. Things began to worsen around the beginning of the COVID pandemic, when Santos became more isolated and the family started to notice cognitive issues such as impaired memory. Other things that could be contributing to his presentation is hyperparathyroidism with hypercalcemia. However, patient's calcium returned to normal limits on 10/16, and patient continues to have disorganized behavior unrelated to calcium elevation, so likely this is not a significant contributing factor to patient's presentation.      Currently, Santos is at times disoriented, but easily re-directable. Presents with some difficulty of word articulation, which contributes to his frustration when interacting with psych team, as he finds it difficult to explain himself. He is able to take care of his ADLs without much assistance and he is mostly independent in the unit. On the other hand, his confused behavior tend to increase in the evenings, seemingly related to  Neurocognitive Disorder diagnosis, and this could evolved to be his new baseline. Either way, Santos does not present as a danger to himself or other so his SIO was discontinued. Santos does not present with any new episodes of physical agitation and is able to attend groups and interact with peers without major  altercations. Patient currently is stable with current neuroleptic dose, patient probably wont benefit from any modification in current therapy.     Family care meeting 01/02 with psych team: update on current availability of placement options and Santos's current presentation. Family aware of Santos's level of functionality with ADLs, besides  disorientation episodes at evenings, that only requires minimal intervention for redirection. Still pending visits to possible memory care placements by family.    Santos appeared more confused throughout the day, different from his usual baseline, per staff report. No changed in vital signs but concern for acute delirium. UA was requested.    Goal of treatment:  Procure a memory care placement.      Psychiatric Plan by Diagnosis     Today's changes:  - No medication changes     # Major Neurocognitive Disorder  1. Medications:  - Olanzapine 5 mg at bedtime     3. Additional Plans:  - Patient will be treated in therapeutic milieu with appropriate individual and group therapies as described  - Pending memory facility placement.      # Unspecified anxiety vs Generalized Anxiety Disorder  - Monitor for symptoms.  - Fluoxetine held due to suspicion of ongoing manic symptoms     Psychiatric Hospital Course:      Estevan Aaron was admitted to [facility] on a 72 hour hold.   Medications:  PTA fluoxetine was held due to concern for worsening of zelalem   New medications started at the time of admission include Zyprexa.   Increased olanzapine 10 mg at bedtime was to 10 mg BID (10/14)   Increased olanzapine 10 mg BID to 10 mg during day and 15 mg at bedtime (10/15)  10/21: increased olanzapine pm dose from 15 to 20 mg  10/23: started melatonin 3 mg at 7 pm to help patient with circadian rhythm as he has been staying up throughout the night and sleeping a lot during the day and there is some concern for delirium   10/24: consulted anesthesia for MRI brain   10/28: MRI brain complete, decrease AM  olanzapine from 10 to 5 mg  10/29: Morning olanzapine decreased to 2.5 mg, evening olanzapine decreased to 15 mg   11/1: Decreased evening olanzapine to 10 mg   11/4: Decreased evening olanzapine to 7.5 mg   11/5: Decreased evening olanzapine to 5 mg   11/11: Moved morning dose of olanzapine to the afternoon as patient appears more agitated in the afternoons/evenings  11/21: Started memantine 5 mg daily   11/26: Discontinued olanzapine 2.5 mg during the afternoon  12/2:   Discontinued memantine 5 mg, daily    The risks, benefits, alternatives, and side effects were discussed and understood by the patient and other caregivers.     Medical Assessment and Plan     Medical diagnoses to be addressed this admission:    # Hip Pain  - New right hip pain, and mild pain in multiple joints. Moderate response to topical antiinflammatory gel and lidocaine patch.   - Medicine consulted for recommendations 12/20    # CHF  # Hypertension  - Continue PTA medications  Furosemide 40 mg daily  Lisinopril 40 mg daily  Metoprolol 50 mg daily  Amlodipine 10 mg daily  Clonidine 0.1 mg BID  - Pitting edema in lower extremities, not painful 3+: Medicine consulted for recommendations 12/20  - Weight gain of about 8 pounds in about 2 months currently 249lb as of 01/07/25    # Hyperlipidemia  - Continue PTA Atorvastatin 40 mg     # Primary Hyperparathyroidism  # Hypercalcemia, hypophosphoremia   Increased Ca level to 10.9 and decreased Ph level to 2.3 in the ED   - Consulted endocrinology, who started cinacalcet 30 mg BID on 10/13  - 10/16 endocrinology recommended continuing to trend calcium and albumin to make sure patient does not become hypocalcemic and recommended holding cinacalcet   - 10/17, calcium and albumin wnl, endo recommended cinacalcet 30 mg once daily   - 10/21, per endo continue cincaclcet and recheck calcium and albumin on October 24  - 10/23: per endo, patient needs to follow up outpatient for further management of  hyperparathyroidism      Medical course: Patient was physically examined by the ED prior to being transferred to the unit and was found to be medically stable and appropriate for admission.      Consults: Psychiatry, Endocrinology (follow-up of hyperthyroidism / hypercalcemia and hypertension), neurology     Checklist     Legal Status: Committed   Ortega meds: Haldol, Clozaril, Risperdal, Invega, Zyprexa, Seroquel, Abilify    Safety Assessment:   Behavioral Orders   Procedures    Code 1 - Restrict to Unit    Routine Programming     As clinically indicated    Sexual precautions     Reportedly making crude sexual comments toward female peers.    Status 15     Every 15 minutes.       Risk Assessment:  Risk for harm is low  Risk factors: impulsive and past behaviors  Protective factors: family      SIO: No    Disposition: Pending stabilization, medication optimization, & development of a safe discharge plan.     Attestations     This patient was seen and discussed with my attending physician.  Evelia Grimm DO  PGY-1 Psychiatry Resident     Attestation:  This patient has been seen and evaluated by me, Pete Smith MD.  I have discussed this patient with the house staff team including the resident and/or medical student and I agree with the findings and plan in this note.    I have reviewed today's vital signs, medications, labs and imaging. Pete Smith MD , PhD.

## 2025-01-13 NOTE — PLAN OF CARE
BEH IP Unit Acuity Rating Score (UARS)  Patient is given one point for every criteria they meet.    CRITERIA SCORING   On a 72 hour hold, court hold, committed, stay of commitment, or revocation. 1    Patient LOS on BEH unit exceeds 20 days. 1  LOS: 93   Patient under guardianship, 55+, otherwise medically complex, or under age 11. 1   Suicide ideation without relief of precipitating factors. 0   Current plan for suicide. 0   Current plan for homicide. 0   Imminent risk or actual attempt to seriously harm another without relief of factors precipitating the attempt. 1   Severe dysfunction in daily living (ex: complete neglect for self care, extreme disruption in vegetative function, extreme deterioration in social interactions). 1   Recent (last 7 days) or current physical aggression in the ED or on unit. 0   Restraints or seclusion episode in past 72 hours. 0   Recent (last 7 days) or current verbal aggression, agitation, yelling, etc., while in the ED or unit. 0   Active psychosis. 1   Need for constant or near constant redirection (from leaving, from others, etc).  0   Intrusive or disruptive behaviors. 1   Patient requires 3 or more hours of individualized nursing care per 8-hour shift (i.e. for ADLs, meds, therapeutic interventions). 1   TOTAL 8

## 2025-01-13 NOTE — PLAN OF CARE
Problem: Sleep Disturbance  Goal: Adequate Sleep/Rest  Outcome: Progressing    Pt appears to have slept for 3.75 hours.  Pt was asleep in his room at the start of the shift. He slept intermittently in his room and the lounge area. Pt refused redirection to sleep in his room for the most part of the shift. He denies pain, anxiety, depression, and SI/SIB. No PRN medications were given. 15 minutes safety checks were in place. Staff will continue to offer support to pt.

## 2025-01-13 NOTE — PLAN OF CARE
Team Note Due:  Monday    Assessment/Intervention/Current Symtoms and Care Coordination:  Chart review and met with team, discussed pt progress, symptomology, and response to treatment.  Discussed the discharge plan and any potential impediments to discharge. Clifford Aaron is currently emergency guardian/conservator until 01/20/2025.    Left vm for Lobo Tamez at [facility] requesting an update on status of referral.    Discharge Plan or Goal:  Memory care facility     Barriers to Discharge:  Patient requires further psychiatric stabilization due to current symptomology, medication management with changes subject to provider, coordination with outside supports, and aftercare planning. Pt is under civil commitment.     Referral Status:    Memory Care facilities currently in process:  [facility]. Tour completed 11/27.  [facility]. Tour completed 11/29.  [facility]. Tour completed 11/30. Per Clifford on 1/8, she would like to move forward with a referral. Records sent 1/8.  [facility]. Tour scheduled 1/8/25. Have immediate openings and will accept EW.    Memory Care facilities pending family review:  [facility] [address].  [facility].  [facility].  [facility].  [facility].    Memory Care facilities declined:  [facility] (private pay only, no EW).  [facility] [address] (private pay only, no EW).  [facility] (no openings).  [facility]. Tour completed 11/29. Records sent 12/2/24. Declined 12/19/24 (don't feel they are an appropriate facility for pt's needs).  Lorraine (): [email or web address]; [phone]  Isatu (director of nursing): [email or web address]  [facility] [address].  Denied 12/26 (concerned about dementia with psychosis,  history of hallucinations, high elopement risk, still on 1:1).  Nikki Noel: [email or web address]; Office [phone], Fax [phone]   [facility]. Not accepting EW as of 1/7/25.    Legal Status:  MI Commitment with [facility]  File Number: [id number]  Start and expiration date of commitment: 10/24/24 - 04/24/25    Ortega meds: Haldol, Clozaril, Risperdal, Invega, Zyprexa, Seroquel, Abilify    PPS/CM:  Shelby Chowdary: [phone]  [email or web address].    Contacts:  Maria C Aaron (Daughter): [phone]   Clifford Aaron (ex-wife): [phone]     No Del Angel (guardianship/conservatorship ): [phone]  [email or web address]    Derrell Duff ([employer] ): [phone]  [email or web address]     Upcoming Meetings and Dates/Important Information and next steps:  Follow up with family regarding list of Memory Care facilities  Discharge planning when appropriate  Pt does not qualify for MA per financial counselor on 11/8/2024. Pt will likely privately pay for Memory Care until he qualifies for MA/waivers in the future.    Provisional Discharge and Change of Status needed at discharge

## 2025-01-13 NOTE — PLAN OF CARE
Problem: Adult Behavioral Health Plan of Care  Goal: Plan of Care Review  Outcome: Progressing  Flowsheets (Taken 1/13/2025 9763)  Patient Agreement with Plan of Care: agrees     Problem: Psychotic Signs/Symptoms  Goal: Improved Behavioral Control (Psychotic Signs/Symptoms)  Outcome: Progressing   Goal Outcome Evaluation:    Plan of Care Reviewed With: patient       Pt has been agitated at the start of the shift, he was arguing with a select peer and staff on wearing the mask and demanded to have the tablet for COVID that was created when COVID came. Pt was yelling but was able to be redirectable. Pt was later calm, paced the halls, speech was everywhere. Pt denied all psych symptoms, denied physical pain. Food/fluid intake was adequate, hygiene is unkempt. Pt was compliant with medication, /64 at HS

## 2025-01-13 NOTE — CARE PLAN
01/12/25 2229   Seclusion or Restraint Order Upon Initiation and With Every Order   Attending Provider Notified Yes   Attending Provider's Name Efrem Bernard   In Person Face to Face Assessment Conducted Yes-Eval of pt's immediate situation, reaction to intervention, complete review of systems assessment, behavioral assessment & review/assessment of hx, drugs & meds, recent labs, etc, behavioral condition, need to continue/terminate restraint/seclusion   RN Notified Provider of Result of Face to Face Yes

## 2025-01-14 PROCEDURE — 250N000013 HC RX MED GY IP 250 OP 250 PS 637

## 2025-01-14 PROCEDURE — 99232 SBSQ HOSP IP/OBS MODERATE 35: CPT | Mod: GC | Performed by: PSYCHIATRY & NEUROLOGY

## 2025-01-14 PROCEDURE — 124N000002 HC R&B MH UMMC

## 2025-01-14 RX ADMIN — LISINOPRIL 40 MG: 10 TABLET ORAL at 08:43

## 2025-01-14 RX ADMIN — Medication 25 MG: at 21:04

## 2025-01-14 RX ADMIN — CLONIDINE HYDROCHLORIDE 0.1 MG: 0.1 TABLET ORAL at 08:43

## 2025-01-14 RX ADMIN — Medication 1 PATCH: at 08:47

## 2025-01-14 RX ADMIN — AMLODIPINE BESYLATE 10 MG: 5 TABLET ORAL at 08:43

## 2025-01-14 RX ADMIN — CLONIDINE HYDROCHLORIDE 0.1 MG: 0.1 TABLET ORAL at 21:04

## 2025-01-14 RX ADMIN — FUROSEMIDE 40 MG: 40 TABLET ORAL at 08:43

## 2025-01-14 RX ADMIN — METOPROLOL SUCCINATE 50 MG: 50 TABLET, EXTENDED RELEASE ORAL at 08:43

## 2025-01-14 RX ADMIN — Medication 1250 MCG: at 10:53

## 2025-01-14 RX ADMIN — ATORVASTATIN CALCIUM 40 MG: 20 TABLET, FILM COATED ORAL at 08:43

## 2025-01-14 RX ADMIN — CINACALCET 30 MG: 30 TABLET ORAL at 08:43

## 2025-01-14 RX ADMIN — SENNOSIDES AND DOCUSATE SODIUM 1 TABLET: 50; 8.6 TABLET ORAL at 08:44

## 2025-01-14 RX ADMIN — OLANZAPINE 5 MG: 5 TABLET, ORALLY DISINTEGRATING ORAL at 21:04

## 2025-01-14 RX ADMIN — MELATONIN TAB 3 MG 3 MG: 3 TAB at 21:04

## 2025-01-14 ASSESSMENT — ACTIVITIES OF DAILY LIVING (ADL)
ADLS_ACUITY_SCORE: 66
ADLS_ACUITY_SCORE: 66
DRESS: INDEPENDENT
ADLS_ACUITY_SCORE: 66
ORAL_HYGIENE: INDEPENDENT
ADLS_ACUITY_SCORE: 66
HYGIENE/GROOMING: INDEPENDENT
ADLS_ACUITY_SCORE: 66
DRESS: INDEPENDENT
ADLS_ACUITY_SCORE: 66
LAUNDRY: WITH SUPERVISION
ADLS_ACUITY_SCORE: 66
ORAL_HYGIENE: INDEPENDENT
ADLS_ACUITY_SCORE: 66
HYGIENE/GROOMING: INDEPENDENT

## 2025-01-14 NOTE — PLAN OF CARE
"  Problem: Adult Behavioral Health Plan of Care  Goal: Patient-Specific Goal (Individualization)  Description: You can add care plan individualizations to a care plan. Examples of Individualization might be:  \"Parent requests to be called daily at 9am for status\", \"I have a hard time hearing out of my right ear\", or \"Do not touch me to wake me up as it startles  me\".  Outcome: Progressing  Goal: Adheres to Safety Considerations for Self and Others  Outcome: Progressing     Problem: Depression  Goal: Improved Mood  Outcome: Progressing     Problem: Suicidal Behavior  Goal: Suicidal Behavior is Absent or Managed  Outcome: Progressing   Goal Outcome Evaluation:    Pt is mostly sitting on his favorite chair at the [facility] [place]. He is pleasant and cooperative, medication compliant. Nutrition and hydration is adequate. He had some discussion with a peer who refuses to wear mask on the unit. Pt was redirected and  he then went to the OT room and watched news from there. Pt denied anxiety, depression, SI and hallucinations. He was encouraged to join OT groups but he declined, saying \"I am tired and I don't sleep much\". Gave sleep education to pt . He did not appear to be receptive. He sleeps at the [facility] chairs every now and then. No behavioral concerns.    Pt continues to be on SIO for redirection.       "

## 2025-01-14 NOTE — PROGRESS NOTES
"  ----------------------------------------------------------------------------------------------------------  [facility]  Psychiatry Progress Note  Hospital Day #94     Interim History:     The patient's care was discussed with the treatment team and chart notes were reviewed.    Identifier: Estevan Aaron is a 64 year old male with previous psychiatric diagnoses of  generalized anxiety disorder, Neurocognitive disorder, admitted from the ED 10/12/2024 due to concern for HI and psychosis in the context of medical issues (hyperthyroidism, hypercalcemia) psychosocial stressors including family dynamics with recent possible divorce.    Sleep: 5 hours (01/14/25 0600)  Scheduled medications: Took all scheduled medications as prescribed  Psychiatric PRN medications: none       Staff Report:   Was visible in the milieu, often watching television and interacting with peers. Continues to have poor boundaries with certain peers but redirectable. Denies all mental health concerns.     See staff notes for more information     Subjective:     Patient Interview:  Santos was interviewed in the hallway. Has no acute concerns. Says he talked to his wife recently and that he is working on \"deals\" and his state licensure number. Denies having any issues with memories. Denies having any acute concerns.     ROS:  See above     Objective:     Vitals:  BP (!) 142/64   Pulse 62   Temp 98.1  F (36.7  C) (Oral)   Resp 18   Wt 112.9 kg (248 lb 12.8 oz)   SpO2 95%   BMI 40.16 kg/m      Allergies:  No Known Allergies    Current Medications:  Scheduled:  Current Facility-Administered Medications   Medication Dose Route Frequency Provider Last Rate Last Admin    acetaminophen (TYLENOL) tablet 650 mg  650 mg Oral Q4H PRN Nikki Caceres MD   650 mg at 01/09/25 0228    alum & mag hydroxide-simethicone (MAALOX) suspension 30 mL  30 mL Oral Q4H PRN Nikki Caceres MD   30 mL at 10/17/24 0837    " amLODIPine (NORVASC) tablet 10 mg  10 mg Oral Daily Presley Barrera MD   10 mg at 01/13/25 0900    atorvastatin (LIPITOR) tablet 40 mg  40 mg Oral Daily Nikki Caceres MD   40 mg at 01/13/25 0859    cholecalciferol (VITAMIN D3) capsule 1,250 mcg  1,250 mcg Oral Q7 Days Evelia Grimm DO   1,250 mcg at 01/07/25 1300    cinacalcet (SENSIPAR) tablet 30 mg  30 mg Oral Daily Presley Barrera MD   30 mg at 01/13/25 0859    cloNIDine (CATAPRES) tablet 0.1 mg  0.1 mg Oral BID Presley Barrera MD   0.1 mg at 01/13/25 2026    diclofenac (VOLTAREN) 1 % topical gel 2 g  2 g Topical 4x Daily PRN Rocío Lopez MD   2 g at 12/22/24 2032    furosemide (LASIX) tablet 40 mg  40 mg Oral Daily Presley Barrera MD   40 mg at 01/13/25 0900    gabapentin (NEURONTIN) capsule 100 mg  100 mg Oral Q6H PRN Nikki Caceres MD   100 mg at 12/24/24 0046    hydrocortisone (CORTAID) 0.5 % cream   Topical Daily PRN Savi Chamorro MD        Lidocaine (LIDOCARE) 4 % Patch 1 patch  1 patch Transdermal Q24H PRN Rocío Lopez MD   1 patch at 12/22/24 2023    lisinopril (ZESTRIL) tablet 40 mg  40 mg Oral Daily Presley Barrera MD   40 mg at 01/13/25 0859    melatonin tablet 3 mg  3 mg Oral At Bedtime Presley Barrera MD   3 mg at 01/13/25 2026    metoprolol succinate ER (TOPROL XL) 24 hr tablet 50 mg  50 mg Oral Daily Presley Barrera MD   50 mg at 01/13/25 0900    nicotine (NICODERM CQ) 14 MG/24HR 24 hr patch 1 patch  1 patch Transdermal Daily Evelia Grimm DO   1 patch at 01/13/25 0859    nicotine (NICORETTE) gum 2 mg  2 mg Buccal Q1H PRN González Garcia MD   2 mg at 10/24/24 1254    OLANZapine zydis (zyPREXA) ODT tab 5 mg  5 mg Oral At Bedtime González Garcia MD   5 mg at 01/13/25 2026    Or    OLANZapine (zyPREXA) injection 10 mg  10 mg Intramuscular At Bedtime González Garcia MD   10 mg at 01/12/25 2211    OLANZapine (zyPREXA) tablet 5 mg  5 mg Oral TID PRN Evelia Grimm DO   5 mg at 01/05/25  1645    Or    OLANZapine (zyPREXA) injection 5 mg  5 mg Intramuscular TID PRN Evelia Grimm DO        polyethylene glycol (MIRALAX) Packet 17 g  17 g Oral Daily PRN Nikki Caceres MD        senna-docusate (SENOKOT-S/PERICOLACE) 8.6-50 MG per tablet 1 tablet  1 tablet Oral Daily Evelia Grimm DO   1 tablet at 01/13/25 0900    traZODone (DESYREL) half-tab 25 mg  25 mg Oral At Bedtime Rocío Lopez MD   25 mg at 01/13/25 2026    traZODone (DESYREL) half-tab 25 mg  25 mg Oral At Bedtime PRN Evelia Grimm DO   25 mg at 12/24/24 0046       PRN:  Current Facility-Administered Medications   Medication Dose Route Frequency Provider Last Rate Last Admin    acetaminophen (TYLENOL) tablet 650 mg  650 mg Oral Q4H PRN Nikki Caceres MD   650 mg at 01/09/25 0228    alum & mag hydroxide-simethicone (MAALOX) suspension 30 mL  30 mL Oral Q4H PRN Nikki Caceres MD   30 mL at 10/17/24 0837    amLODIPine (NORVASC) tablet 10 mg  10 mg Oral Daily Presley Barrera MD   10 mg at 01/13/25 0900    atorvastatin (LIPITOR) tablet 40 mg  40 mg Oral Daily Nikki Caceres MD   40 mg at 01/13/25 0859    cholecalciferol (VITAMIN D3) capsule 1,250 mcg  1,250 mcg Oral Q7 Days Evelia Grimm DO   1,250 mcg at 01/07/25 1300    cinacalcet (SENSIPAR) tablet 30 mg  30 mg Oral Daily Presley Barrera MD   30 mg at 01/13/25 0859    cloNIDine (CATAPRES) tablet 0.1 mg  0.1 mg Oral BID Presley Barrera MD   0.1 mg at 01/13/25 2026    diclofenac (VOLTAREN) 1 % topical gel 2 g  2 g Topical 4x Daily PRN Rocío Lopez MD   2 g at 12/22/24 2032    furosemide (LASIX) tablet 40 mg  40 mg Oral Daily Presley Barrera MD   40 mg at 01/13/25 0900    gabapentin (NEURONTIN) capsule 100 mg  100 mg Oral Q6H PRN Nikki Caceres MD   100 mg at 12/24/24 0046    hydrocortisone (CORTAID) 0.5 % cream   Topical Daily PRN Savi Chamorro MD        Lidocaine (LIDOCARE) 4 % Patch 1 patch  1 patch Transdermal Q24H PRN Salazar  Rocío Juarez MD   1 patch at 12/22/24 2023    lisinopril (ZESTRIL) tablet 40 mg  40 mg Oral Daily Presley Barrera MD   40 mg at 01/13/25 0859    melatonin tablet 3 mg  3 mg Oral At Bedtime Presley Barrera MD   3 mg at 01/13/25 2026    metoprolol succinate ER (TOPROL XL) 24 hr tablet 50 mg  50 mg Oral Daily Presley Barrera MD   50 mg at 01/13/25 0900    nicotine (NICODERM CQ) 14 MG/24HR 24 hr patch 1 patch  1 patch Transdermal Daily Evelia Grimm DO   1 patch at 01/13/25 0859    nicotine (NICORETTE) gum 2 mg  2 mg Buccal Q1H PRN González Garcia MD   2 mg at 10/24/24 1254    OLANZapine zydis (zyPREXA) ODT tab 5 mg  5 mg Oral At Bedtime González Garcia MD   5 mg at 01/13/25 2026    Or    OLANZapine (zyPREXA) injection 10 mg  10 mg Intramuscular At Bedtime González Garcia MD   10 mg at 01/12/25 2211    OLANZapine (zyPREXA) tablet 5 mg  5 mg Oral TID PRN Evelia Grimm DO   5 mg at 01/05/25 1645    Or    OLANZapine (zyPREXA) injection 5 mg  5 mg Intramuscular TID PRN Evelia Grimm DO        polyethylene glycol (MIRALAX) Packet 17 g  17 g Oral Daily PRN Nikki Caceres MD        senna-docusate (SENOKOT-S/PERICOLACE) 8.6-50 MG per tablet 1 tablet  1 tablet Oral Daily Evelia Grimm DO   1 tablet at 01/13/25 0900    traZODone (DESYREL) half-tab 25 mg  25 mg Oral At Bedtime Rocío Lopez MD   25 mg at 01/13/25 2026    traZODone (DESYREL) half-tab 25 mg  25 mg Oral At Bedtime PRN Evelia Grimm DO   25 mg at 12/24/24 0046     Labs and Imaging:  Data this admission:  - CBC unremarkable  - CMP unremarkable  - TSH normal  - UDS negative  - Vit D low  - Hgb A1c 5.9 (10/13/24)  - Lipids unremarkable  - Vit B12 normal  - Folate normal  - Urinalysis unremarkable  - EKG normal sinus rhythm, QTc 390 ms  - Head CT showed no acute changes  - HIV non reactive  - Treponema antibody non reactive  - ESR wnl   - Ceruloplasmin wnl   - BOOGIE negative  - Lyme negative      Parathyroid hormone:   85 (10/13)  "    Calcium:  11.4 (10/14) -> 10.3 (10/16) -> 9.8 (10/19) -> 10.6 (10/21) -> 10.3 (10/23)     Albumin:  4.3 (10/14) -->  4.3 (10/16) -> 4.1 (10/17) -> 4.2 (10/19) -> 4.5 (10/21) -> 4.3 (10/23)      Mental Status Exam:   Oriented to: Oriented to self only  General:  Awake and Alert  Appearance:  appears stated age, Grooming is adequate, and Dressed in his own clothes  Behavior/Attitude:  Engaged   Eye Contact: Appropriate   Psychomotor: No evidence of tics, dystonia, or tardive dyskinesia  no catatonia present  Speech:  appropriate volume/tone, talkative, and spontaneous , apraxia of speech  Language: Fluent in English with appropriate syntax and vocabulary.  Mood:  \"good\"  Affect: appropriate   Thought Process:  coherent  Thought Content:  No apparent delusions   Associations:  loose  Insight:  impaired   Judgment:  impaired   Impulse control: limited  Attention Span:   mildly decreased  Concentration:   distractible  Recent and Remote Memory:   remote memory intact, recent memory impaired  Fund of Knowledge: average  Muscle Strength and Tone: normal  Gait and Station: Normal     Psychiatric Assessment     Estevan Aaron is a 64 year old male with previous psychiatric diagnoses of GEORGINA admitted from the ER on 10/12/2024 due to concern for HI and psychosis in the context of medical issues (hyperthyroidism, hypercalcemia), psychosocial stressors including with recent divorce and selling his home. This is the patient's first psychiatric hospitalization. The MSE on admission was pertinent for a thought process which was perseverative, circumstantial, tangential, disorganized and tangential; with rambling and looseness of associations. Psychological contributions to presentation included lack of insight. Social factors contributing to presentation included isolation, recent divorce, selling his house, and moving from hotel to hotel. Biological contributions to presentation included a history of hyperparathyroidism with " chronic hypercalcemia per charts as well as a history of methamphetamine use per collateral from ex-wife.     Per collateral from ex-wife, Santos has had paranoid ideations since they first met. He has always felt like people are out to get him or trying to rip him off. She says that he has had visual hallucinations (he will point things out that the rest of the family can't see) for a long time, but the family would just go along with him to avoid making him angry. This timeline and presentation could be consistent with diagnosis of paranoid personality disorder. Things began to worsen around the beginning of the COVID pandemic, when Santos became more isolated and the family started to notice cognitive issues such as impaired memory. Other things that could be contributing to his presentation is hyperparathyroidism with hypercalcemia. However, patient's calcium returned to normal limits on 10/16, and patient continues to have disorganized behavior unrelated to calcium elevation, so likely this is not a significant contributing factor to patient's presentation.      Currently, Santos is at times disoriented, but easily re-directable. Presents with some difficulty of word articulation, which contributes to his frustration when interacting with psych team, as he finds it difficult to explain himself. He is able to take care of his ADLs without much assistance and he is mostly independent in the unit. On the other hand, his confused behavior tend to increase in the evenings, seemingly related to  Neurocognitive Disorder diagnosis, and this could evolved to be his new baseline. Either way, Santos does not present as a danger to himself or other so his SIO was discontinued. Santos does not present with any new episodes of physical agitation and is able to attend groups and interact with peers without major altercations. Patient currently is stable with current neuroleptic dose, patient probably wont benefit from any modification in  current therapy.     Family care meeting 01/02 with psych team: update on current availability of placement options and Santos's current presentation. Family aware of Santos's level of functionality with ADLs, besides  disorientation episodes at evenings, that only requires minimal intervention for redirection. Still pending visits to possible memory care placements by family.    Santos appeared more confused throughout the day, different from his usual baseline, per staff report. No changed in vital signs but concern for acute delirium. UA was requested.    Goal of treatment:  Procure a memory care placement.      Psychiatric Plan by Diagnosis     Today's changes:  - No medication changes     # Major Neurocognitive Disorder  1. Medications:  - Olanzapine 5 mg at bedtime     3. Additional Plans:  - Patient will be treated in therapeutic milieu with appropriate individual and group therapies as described  - Pending memory facility placement.      # Unspecified anxiety vs Generalized Anxiety Disorder  - Monitor for symptoms.  - Fluoxetine held due to suspicion of ongoing manic symptoms     Psychiatric Hospital Course:      Estevan Aaron was admitted to [facility] on a 72 hour hold.   Medications:  PTA fluoxetine was held due to concern for worsening of zelalem   New medications started at the time of admission include Zyprexa.   Increased olanzapine 10 mg at bedtime was to 10 mg BID (10/14)   Increased olanzapine 10 mg BID to 10 mg during day and 15 mg at bedtime (10/15)  10/21: increased olanzapine pm dose from 15 to 20 mg  10/23: started melatonin 3 mg at 7 pm to help patient with circadian rhythm as he has been staying up throughout the night and sleeping a lot during the day and there is some concern for delirium   10/24: consulted anesthesia for MRI brain   10/28: MRI brain complete, decrease AM olanzapine from 10 to 5 mg  10/29: Morning olanzapine decreased to 2.5 mg, evening olanzapine decreased to 15 mg   11/1: Decreased  evening olanzapine to 10 mg   11/4: Decreased evening olanzapine to 7.5 mg   11/5: Decreased evening olanzapine to 5 mg   11/11: Moved morning dose of olanzapine to the afternoon as patient appears more agitated in the afternoons/evenings  11/21: Started memantine 5 mg daily   11/26: Discontinued olanzapine 2.5 mg during the afternoon  12/2:   Discontinued memantine 5 mg, daily    The risks, benefits, alternatives, and side effects were discussed and understood by the patient and other caregivers.     Medical Assessment and Plan     Medical diagnoses to be addressed this admission:    # Hip Pain  - New right hip pain, and mild pain in multiple joints. Moderate response to topical antiinflammatory gel and lidocaine patch.   - Medicine consulted for recommendations 12/20    # CHF  # Hypertension  - Continue PTA medications  Furosemide 40 mg daily  Lisinopril 40 mg daily  Metoprolol 50 mg daily  Amlodipine 10 mg daily  Clonidine 0.1 mg BID  - Pitting edema in lower extremities, not painful 3+: Medicine consulted for recommendations 12/20  - Weight gain of about 8 pounds in about 2 months currently 249lb as of 01/07/25    # Hyperlipidemia  - Continue PTA Atorvastatin 40 mg     # Primary Hyperparathyroidism  # Hypercalcemia, hypophosphoremia   Increased Ca level to 10.9 and decreased Ph level to 2.3 in the ED   - Consulted endocrinology, who started cinacalcet 30 mg BID on 10/13  - 10/16 endocrinology recommended continuing to trend calcium and albumin to make sure patient does not become hypocalcemic and recommended holding cinacalcet   - 10/17, calcium and albumin wnl, endo recommended cinacalcet 30 mg once daily   - 10/21, per endo continue cincaclcet and recheck calcium and albumin on October 24  - 10/23: per endo, patient needs to follow up outpatient for further management of hyperparathyroidism      Medical course: Patient was physically examined by the ED prior to being transferred to the unit and was found to be  medically stable and appropriate for admission.      Consults: Psychiatry, Endocrinology (follow-up of hyperthyroidism / hypercalcemia and hypertension), neurology     Checklist     Legal Status: Committed   [facility] meds: Haldol, Clozaril, Risperdal, Invega, Zyprexa, Seroquel, Abilify    Safety Assessment:   Behavioral Orders   Procedures    Code 1 - Restrict to Unit    Routine Programming     As clinically indicated    Sexual precautions     Reportedly making crude sexual comments toward female peers.    Status 15     Every 15 minutes.    Status Individual Observation     Patient SIO status reviewed with team/RN.  Please also refer to RN/team documentation for add'l detail.    -SIO staff to monitor following which have contributed to patient being on SIO:  Severe intrusiveness towards peers, verbal and physical agitation    -Possible interventions SIO staff could use to support patient's treatment progress:  Verbal deescalation, offer PRNs   -When following observed, team will review discontinuation of SIO:  Clinical improvement     Order Specific Question:   CONTINUOUS 24 hours / day     Answer:   Other     Order Specific Question:   Specify distance     Answer:   20 feet     Order Specific Question:   Indications for SIO     Answer:   Severe intrusiveness       Risk Assessment:  Risk for harm is low  Risk factors: impulsive and past behaviors  Protective factors: family      SIO: No    Disposition: Pending stabilization, medication optimization, & development of a safe discharge plan.     Attestations     This patient was seen and discussed with my attending physician.  Evelia Grimm DO  PGY-1 Psychiatry Resident        Attestation:  This patient has been seen and evaluated by me, Pete Smith MD.  I have discussed this patient with the house staff team including the resident and/or medical student and I agree with the findings and plan in this note.    I have reviewed today's vital signs, medications, labs and  imaging. Pete Smith MD , PhD.

## 2025-01-14 NOTE — PLAN OF CARE
Problem: Sleep Disturbance  Goal: Adequate Sleep/Rest  Outcome: Progressing    Pt appears to have slept for 5 hours.  He woke up intermittently and sat in the lounge with his SIO staff by his side.  Pt denies pain, anxiety, depression, and SI/SIB. No PRN medications were given. Pt is on 1:1 SIO for assault risk and severe intrusiveness. 15 minutes safety checks were in place. Staff will continue to offer support to pt.

## 2025-01-14 NOTE — PLAN OF CARE
Team Note Due:  Monday    Assessment/Intervention/Current Symtoms and Care Coordination:  Chart review and met with team, discussed pt progress, symptomology, and response to treatment.  Discussed the discharge plan and any potential impediments to discharge. Clifford Aaron is currently emergency guardian/conservator until 01/20/2025.    Received call from , Vera, at [facility] [place] wanting to discuss the referral for pt. She had some concerns about pt's initial MH symptoms, civil commitment, and finances. These concerns were addressed and Vera felt comfortable moving forward with scheduling an RN assessment. She also plans to be in touch with Clifford tomorrow morning to discuss finances further.    Sent update to pt's family on conversation with Vera and pending RN assessment.    Discharge Plan or Goal:  Memory care facility     Barriers to Discharge:  Patient requires further psychiatric stabilization due to current symptomology, medication management with changes subject to provider, coordination with outside supports, and aftercare planning. Pt is under civil commitment.     Referral Status:    Memory Care facilities currently in process:  [facility]. Marcus completed 11/27.  [facility]. Tour completed 11/29.  [facility]. Tour completed 11/30. Per Clifford on 1/8, she would like to move forward with a referral. Records sent 1/8. RN assessment pending as of 1/14.  [facility]. Marcus scheduled 1/8/25. Have immediate openings and will accept EW.    Memory Care facilities pending family review:  [facility].  [facility] [place].  [facility].  [facility].  [facility].    Memory Care facilities declined:  [facility] (private pay only, no EW).  [facility] [address] (private pay only, no EW).  [facility] [place] (no  openings).  [facility]. Tour completed 11/29. Records sent 12/2/24. Declined 12/19/24 (don't feel they are an appropriate facility for pt's needs).  [facility] (): [email or web address]; [phone]  Isatu (director of nursing): [email or web address]  [facility] [place].  Denied 12/26 (concerned about dementia with psychosis, history of hallucinations, high elopement risk, still on 1:1).  Nikki Noel: [email or web address]; Office [phone], Fax [phone]   [facility]. Not accepting EW as of 1/7/25.    Legal Status:  MI Commitment with [facility]  File Number: [id number]  Start and expiration date of commitment: 10/24/24 - 04/24/25    [facility] meds: Haldol, Clozaril, Risperdal, Invega, Zyprexa, Seroquel, Abilify    PPS/CM:  Shelby Chowdary: [phone]  [email or web address].    Contacts:  Maria C Aaron (Daughter): [phone]   Clifford Aaron (ex-wife): [phone]     No Del Angel (guardianship/conservatorship ): [phone]  [email or web address]    Derrell Duff ([place] ): [phone]  [email or web address]     Upcoming Meetings and Dates/Important Information and next steps:  Follow up with family regarding list of Memory Care facilities  Discharge planning when appropriate  Pt does not qualify for MA per financial counselor on 11/8/2024. Pt will likely privately pay for Memory Care until he qualifies for MA/waivers in the future.    Provisional Discharge and Change of Status needed at discharge

## 2025-01-14 NOTE — PLAN OF CARE
BEH IP Unit Acuity Rating Score (UARS)  Patient is given one point for every criteria they meet.    CRITERIA SCORING   On a 72 hour hold, court hold, committed, stay of commitment, or revocation. 1    Patient LOS on BEH unit exceeds 20 days. 1  LOS: 94   Patient under guardianship, 55+, otherwise medically complex, or under age 11. 1   Suicide ideation without relief of precipitating factors. 0   Current plan for suicide. 0   Current plan for homicide. 0   Imminent risk or actual attempt to seriously harm another without relief of factors precipitating the attempt. 1   Severe dysfunction in daily living (ex: complete neglect for self care, extreme disruption in vegetative function, extreme deterioration in social interactions). 1   Recent (last 7 days) or current physical aggression in the ED or on unit. 0   Restraints or seclusion episode in past 72 hours. 0   Recent (last 7 days) or current verbal aggression, agitation, yelling, etc., while in the ED or unit. 0   Active psychosis. 1   Need for constant or near constant redirection (from leaving, from others, etc).  0   Intrusive or disruptive behaviors. 1   Patient requires 3 or more hours of individualized nursing care per 8-hour shift (i.e. for ADLs, meds, therapeutic interventions). 1   TOTAL 8

## 2025-01-15 PROCEDURE — 99231 SBSQ HOSP IP/OBS SF/LOW 25: CPT | Mod: GC | Performed by: PSYCHIATRY & NEUROLOGY

## 2025-01-15 PROCEDURE — 250N000013 HC RX MED GY IP 250 OP 250 PS 637

## 2025-01-15 PROCEDURE — 124N000002 HC R&B MH UMMC

## 2025-01-15 RX ADMIN — Medication 1 PATCH: at 08:34

## 2025-01-15 RX ADMIN — MELATONIN TAB 3 MG 3 MG: 3 TAB at 20:10

## 2025-01-15 RX ADMIN — Medication 25 MG: at 20:10

## 2025-01-15 RX ADMIN — AMLODIPINE BESYLATE 10 MG: 5 TABLET ORAL at 08:31

## 2025-01-15 RX ADMIN — OLANZAPINE 5 MG: 5 TABLET, ORALLY DISINTEGRATING ORAL at 20:10

## 2025-01-15 RX ADMIN — ATORVASTATIN CALCIUM 40 MG: 20 TABLET, FILM COATED ORAL at 08:31

## 2025-01-15 RX ADMIN — CLONIDINE HYDROCHLORIDE 0.1 MG: 0.1 TABLET ORAL at 08:31

## 2025-01-15 RX ADMIN — METOPROLOL SUCCINATE 50 MG: 50 TABLET, EXTENDED RELEASE ORAL at 08:31

## 2025-01-15 RX ADMIN — CLONIDINE HYDROCHLORIDE 0.1 MG: 0.1 TABLET ORAL at 20:10

## 2025-01-15 RX ADMIN — CINACALCET 30 MG: 30 TABLET ORAL at 08:30

## 2025-01-15 RX ADMIN — LISINOPRIL 40 MG: 10 TABLET ORAL at 08:30

## 2025-01-15 RX ADMIN — FUROSEMIDE 40 MG: 40 TABLET ORAL at 08:31

## 2025-01-15 RX ADMIN — SENNOSIDES AND DOCUSATE SODIUM 1 TABLET: 50; 8.6 TABLET ORAL at 08:31

## 2025-01-15 ASSESSMENT — ACTIVITIES OF DAILY LIVING (ADL)
ADLS_ACUITY_SCORE: 66
DRESS: INDEPENDENT
ADLS_ACUITY_SCORE: 66
ADLS_ACUITY_SCORE: 66
HYGIENE/GROOMING: INDEPENDENT
LAUNDRY: WITH SUPERVISION
ADLS_ACUITY_SCORE: 66
ORAL_HYGIENE: INDEPENDENT
ADLS_ACUITY_SCORE: 66

## 2025-01-15 NOTE — PROGRESS NOTES
----------------------------------------------------------------------------------------------------------  [facility]  Psychiatry Progress Note  Hospital Day #95     Interim History:     The patient's care was discussed with the treatment team and chart notes were reviewed.    Identifier: Estevan Aaron is a 64 year old male with previous psychiatric diagnoses of  generalized anxiety disorder, Neurocognitive disorder, admitted from the ED 10/12/2024 due to concern for HI and psychosis in the context of medical issues (hyperthyroidism, hypercalcemia) psychosocial stressors including family dynamics with recent possible divorce.    Sleep: 5.5 hours (01/15/25 0600)  Scheduled medications: Took all scheduled medications as prescribed  Psychiatric PRN medications: none     Staff Report:   No acute events overnight. In summary he has continued to be confused but generally agreeable and having interaction with select peers and discussing masking with ongoing COVID on unit. See staff notes for details.      Subjective:     Patient Interview:  Santos was interviewed in the milieu. He denies having a sore throat or sniffles, is enjoying watching a movie and prefers to not be interrupted. He believes he slept well and feels fine. Has no acute concerns.    ROS:  See above     Objective:     Vitals:  BP (!) 143/83   Pulse 56   Temp (!) 96.2  F (35.7  C)   Resp 16   Wt 113.7 kg (250 lb 9.6 oz)   SpO2 96%   BMI 40.45 kg/m      Allergies:  No Known Allergies    Current Medications:  Scheduled:  Current Facility-Administered Medications   Medication Dose Route Frequency Provider Last Rate Last Admin    acetaminophen (TYLENOL) tablet 650 mg  650 mg Oral Q4H PRN Nikki Caceres MD   650 mg at 01/09/25 0228    alum & mag hydroxide-simethicone (MAALOX) suspension 30 mL  30 mL Oral Q4H PRN Nikki Caceres MD   30 mL at 10/17/24 0837    amLODIPine (NORVASC) tablet 10 mg  10 mg  Oral Daily Presley Barrera MD   10 mg at 01/15/25 0831    atorvastatin (LIPITOR) tablet 40 mg  40 mg Oral Daily Nikki Caceres MD   40 mg at 01/15/25 0831    cholecalciferol (VITAMIN D3) capsule 1,250 mcg  1,250 mcg Oral Q7 Days Evelia Grimm DO   1,250 mcg at 01/14/25 1053    cinacalcet (SENSIPAR) tablet 30 mg  30 mg Oral Daily Presley Barrera MD   30 mg at 01/15/25 0830    cloNIDine (CATAPRES) tablet 0.1 mg  0.1 mg Oral BID Presley Barrera MD   0.1 mg at 01/15/25 0831    diclofenac (VOLTAREN) 1 % topical gel 2 g  2 g Topical 4x Daily PRN Rocío Lopez MD   2 g at 12/22/24 2032    furosemide (LASIX) tablet 40 mg  40 mg Oral Daily Presley Barrera MD   40 mg at 01/15/25 0831    gabapentin (NEURONTIN) capsule 100 mg  100 mg Oral Q6H PRN Nikki Caceres MD   100 mg at 12/24/24 0046    hydrocortisone (CORTAID) 0.5 % cream   Topical Daily PRN Savi Chamorro MD        Lidocaine (LIDOCARE) 4 % Patch 1 patch  1 patch Transdermal Q24H PRN Rocío Lopez MD   1 patch at 12/22/24 2023    lisinopril (ZESTRIL) tablet 40 mg  40 mg Oral Daily Presley Barrera MD   40 mg at 01/15/25 0830    melatonin tablet 3 mg  3 mg Oral At Bedtime Presley Barrera MD   3 mg at 01/14/25 2104    metoprolol succinate ER (TOPROL XL) 24 hr tablet 50 mg  50 mg Oral Daily Presley Barrera MD   50 mg at 01/15/25 0831    nicotine (NICODERM CQ) 14 MG/24HR 24 hr patch 1 patch  1 patch Transdermal Daily Evelia Grimm DO   1 patch at 01/14/25 0847    nicotine (NICORETTE) gum 2 mg  2 mg Buccal Q1H PRN González Garcia MD   2 mg at 10/24/24 1254    OLANZapine zydis (zyPREXA) ODT tab 5 mg  5 mg Oral At Bedtime González Garcia MD   5 mg at 01/14/25 2104    Or    OLANZapine (zyPREXA) injection 10 mg  10 mg Intramuscular At Bedtime González Garcia MD   10 mg at 01/12/25 2211    OLANZapine (zyPREXA) tablet 5 mg  5 mg Oral TID PRN Evelia Grimm DO   5 mg at 01/05/25 1645    Or    OLANZapine (zyPREXA)  injection 5 mg  5 mg Intramuscular TID PRN Evelia Grimm DO        polyethylene glycol (MIRALAX) Packet 17 g  17 g Oral Daily PRN Nikki Caceres MD        senna-docusate (SENOKOT-S/PERICOLACE) 8.6-50 MG per tablet 1 tablet  1 tablet Oral Daily Evelia Grimm DO   1 tablet at 01/15/25 0831    traZODone (DESYREL) half-tab 25 mg  25 mg Oral At Bedtime Rocío Lopez MD   25 mg at 01/14/25 2104    traZODone (DESYREL) half-tab 25 mg  25 mg Oral At Bedtime PRN Evelia Grimm DO   25 mg at 12/24/24 0046       PRN:  Current Facility-Administered Medications   Medication Dose Route Frequency Provider Last Rate Last Admin    acetaminophen (TYLENOL) tablet 650 mg  650 mg Oral Q4H PRN Nikki Caceres MD   650 mg at 01/09/25 0228    alum & mag hydroxide-simethicone (MAALOX) suspension 30 mL  30 mL Oral Q4H PRN Nikki Caceres MD   30 mL at 10/17/24 0837    amLODIPine (NORVASC) tablet 10 mg  10 mg Oral Daily Presley Barrera MD   10 mg at 01/15/25 0831    atorvastatin (LIPITOR) tablet 40 mg  40 mg Oral Daily Nikik Caceres MD   40 mg at 01/15/25 0831    cholecalciferol (VITAMIN D3) capsule 1,250 mcg  1,250 mcg Oral Q7 Days Evelia Grimm DO   1,250 mcg at 01/14/25 1053    cinacalcet (SENSIPAR) tablet 30 mg  30 mg Oral Daily Presley Barrera MD   30 mg at 01/15/25 0830    cloNIDine (CATAPRES) tablet 0.1 mg  0.1 mg Oral BID Presley Barrera MD   0.1 mg at 01/15/25 0831    diclofenac (VOLTAREN) 1 % topical gel 2 g  2 g Topical 4x Daily PRN Rocío Lopez MD   2 g at 12/22/24 2032    furosemide (LASIX) tablet 40 mg  40 mg Oral Daily Presley Barrera MD   40 mg at 01/15/25 0831    gabapentin (NEURONTIN) capsule 100 mg  100 mg Oral Q6H PRN Nikki Caceres MD   100 mg at 12/24/24 0046    hydrocortisone (CORTAID) 0.5 % cream   Topical Daily PRN Savi Chamorro MD        Lidocaine (LIDOCARE) 4 % Patch 1 patch  1 patch Transdermal Q24H PRN Rocío Lopez MD   1  patch at 12/22/24 2023    lisinopril (ZESTRIL) tablet 40 mg  40 mg Oral Daily Presley Barrera MD   40 mg at 01/15/25 0830    melatonin tablet 3 mg  3 mg Oral At Bedtime Presley Barrera MD   3 mg at 01/14/25 2104    metoprolol succinate ER (TOPROL XL) 24 hr tablet 50 mg  50 mg Oral Daily Presley Barrera MD   50 mg at 01/15/25 0831    nicotine (NICODERM CQ) 14 MG/24HR 24 hr patch 1 patch  1 patch Transdermal Daily Evelia Grimm DO   1 patch at 01/14/25 0847    nicotine (NICORETTE) gum 2 mg  2 mg Buccal Q1H PRN González Garcia MD   2 mg at 10/24/24 1254    OLANZapine zydis (zyPREXA) ODT tab 5 mg  5 mg Oral At Bedtime González Garcia MD   5 mg at 01/14/25 2104    Or    OLANZapine (zyPREXA) injection 10 mg  10 mg Intramuscular At Bedtime González Garcia MD   10 mg at 01/12/25 2211    OLANZapine (zyPREXA) tablet 5 mg  5 mg Oral TID PRN Evelia Grimm DO   5 mg at 01/05/25 1645    Or    OLANZapine (zyPREXA) injection 5 mg  5 mg Intramuscular TID PRN Evelia Grimm DO        polyethylene glycol (MIRALAX) Packet 17 g  17 g Oral Daily PRN Nikki Caceres MD        senna-docusate (SENOKOT-S/PERICOLACE) 8.6-50 MG per tablet 1 tablet  1 tablet Oral Daily Evelia Grimm DO   1 tablet at 01/15/25 0831    traZODone (DESYREL) half-tab 25 mg  25 mg Oral At Bedtime Rocío Lopez MD   25 mg at 01/14/25 2104    traZODone (DESYREL) half-tab 25 mg  25 mg Oral At Bedtime PRN Evelia Grimm DO   25 mg at 12/24/24 0046     Labs and Imaging:  Data this admission:  - CBC unremarkable  - CMP unremarkable  - TSH normal  - UDS negative  - Vit D low  - Hgb A1c 5.9 (10/13/24)  - Lipids unremarkable  - Vit B12 normal  - Folate normal  - Urinalysis unremarkable  - EKG normal sinus rhythm, QTc 390 ms  - Head CT showed no acute changes  - HIV non reactive  - Treponema antibody non reactive  - ESR wnl   - Ceruloplasmin wnl   - BOOGIE negative  - Lyme negative      Parathyroid hormone:   85 (10/13)     Calcium:  11.4 (10/14) -> 10.3  "(10/16) -> 9.8 (10/19) -> 10.6 (10/21) -> 10.3 (10/23)     Albumin:  4.3 (10/14) -->  4.3 (10/16) -> 4.1 (10/17) -> 4.2 (10/19) -> 4.5 (10/21) -> 4.3 (10/23)      Mental Status Exam:   Oriented to:  Person/Self  General:  Awake and Alert  Appearance:  appears stated age, Grooming is adequate, and Dressed in his own clothing  Behavior/Attitude:  Calm and Disengaged  Eye Contact: Appropriate and Glances  Psychomotor: Normal no catatonia present  Speech:  appropriate volume/tone  Language: Fluent in English with appropriate syntax and vocabulary.  Mood:  \"Fine\"  Affect:  appropriate and congruent with mood  Thought Process:  confused  Thought Content:   No SI/HI/AH/VH; No apparent delusions  Associations:  questionable  Insight:  impaired due to neurocognitive disorder  Judgment:  impaired due to neurocognitive disorder  Impulse control: limited  Attention Span:  inattentive  Concentration:   distractible  Recent and Remote Memory:   Remote memory intact, recent memory impaired  Fund of Knowledge: average  Muscle Strength and Tone: normal  Gait and Station: Normal     Psychiatric Assessment     Estevan Aaron is a 64 year old male with previous psychiatric diagnoses of GEORGINA admitted from the ER on 10/12/2024 due to concern for HI and psychosis in the context of medical issues (hyperthyroidism, hypercalcemia), psychosocial stressors including with recent divorce and selling his home. This is the patient's first psychiatric hospitalization. The MSE on admission was pertinent for a thought process which was perseverative, circumstantial, tangential, disorganized and tangential; with rambling and looseness of associations. Psychological contributions to presentation included lack of insight. Social factors contributing to presentation included isolation, recent divorce, selling his house, and moving from hotel to hotel. Biological contributions to presentation included a history of hyperparathyroidism with chronic " hypercalcemia per charts as well as a history of methamphetamine use per collateral from ex-wife.     Per collateral from ex-wife, Santos has had paranoid ideations since they first met. He has always felt like people are out to get him or trying to rip him off. She says that he has had visual hallucinations (he will point things out that the rest of the family can't see) for a long time, but the family would just go along with him to avoid making him angry. This timeline and presentation could be consistent with diagnosis of paranoid personality disorder. Things began to worsen around the beginning of the COVID pandemic, when Santos became more isolated and the family started to notice cognitive issues such as impaired memory. Other things that could be contributing to his presentation is hyperparathyroidism with hypercalcemia. However, patient's calcium returned to normal limits on 10/16, and patient continues to have disorganized behavior unrelated to calcium elevation, so likely this is not a significant contributing factor to patient's presentation.      Currently, Santos is at times disoriented, but easily re-directable. Presents with some difficulty of word articulation, which contributes to his frustration when interacting with psych team, as he finds it difficult to explain himself. He is able to take care of his ADLs without much assistance and he is mostly independent in the unit. On the other hand, his confused behavior tend to increase in the evenings, seemingly related to  Neurocognitive Disorder diagnosis, and this could evolved to be his new baseline. Either way, Santos does not present as a danger to himself or other so his SIO was discontinued. Santos does not present with any new episodes of physical agitation and is able to attend groups and interact with peers without major altercations. Patient currently is stable with current neuroleptic dose, patient probably wont benefit from any modification in current  therapy.     Family care meeting 01/02 with psych team: update on current availability of placement options and Santos's current presentation. Family aware of Santos's level of functionality with ADLs, besides  disorientation episodes at evenings, that only requires minimal intervention for redirection. Still pending visits to possible memory care placements by family.    Santos appeared more confused throughout the day, different from his usual baseline, per staff report. No changed in vital signs but concern for acute delirium. UA was requested.    Goal of treatment:  Procure a memory care placement.      Psychiatric Plan by Diagnosis     Today's changes:  - COVID PCR today  - Discontinue SIO at 1400 today  - No medication changes     # Major Neurocognitive Disorder  1. Medications:  - Olanzapine 5 mg at bedtime     3. Additional Plans:  - Patient will be treated in therapeutic milieu with appropriate individual and group therapies as described  - Pending memory facility placement.      # Unspecified anxiety vs Generalized Anxiety Disorder  - Monitor for symptoms.  - Fluoxetine held due to suspicion of ongoing manic symptoms     Psychiatric Hospital Course:      Estevan Aaron was admitted to [facility] on a 72 hour hold.   Medications:  PTA fluoxetine was held due to concern for worsening of zelalem   New medications started at the time of admission include Zyprexa.   Increased olanzapine 10 mg at bedtime was to 10 mg BID (10/14)   Increased olanzapine 10 mg BID to 10 mg during day and 15 mg at bedtime (10/15)  10/21: increased olanzapine pm dose from 15 to 20 mg  10/23: started melatonin 3 mg at 7 pm to help patient with circadian rhythm as he has been staying up throughout the night and sleeping a lot during the day and there is some concern for delirium   10/24: consulted anesthesia for MRI brain   10/28: MRI brain complete, decrease AM olanzapine from 10 to 5 mg  10/29: Morning olanzapine decreased to 2.5 mg, evening  olanzapine decreased to 15 mg   11/1: Decreased evening olanzapine to 10 mg   11/4: Decreased evening olanzapine to 7.5 mg   11/5: Decreased evening olanzapine to 5 mg   11/11: Moved morning dose of olanzapine to the afternoon as patient appears more agitated in the afternoons/evenings  11/21: Started memantine 5 mg daily   11/26: Discontinued olanzapine 2.5 mg during the afternoon  12/2:   Discontinued memantine 5 mg, daily  01/15 COVID PCR due to reported sniffles    The risks, benefits, alternatives, and side effects were discussed and understood by the patient and other caregivers.     Medical Assessment and Plan     Medical diagnoses to be addressed this admission:    # Hip Pain  - New right hip pain, and mild pain in multiple joints. Moderate response to topical antiinflammatory gel and lidocaine patch.   - Medicine consulted for recommendations 12/20    # CHF  # Hypertension  - Continue PTA medications  Furosemide 40 mg daily  Lisinopril 40 mg daily  Metoprolol 50 mg daily  Amlodipine 10 mg daily  Clonidine 0.1 mg BID  - Pitting edema in lower extremities, not painful 3+: Medicine consulted for recommendations 12/20  - Weight gain of about 8 pounds in about 2 months currently 249lb as of 01/07/25    # Hyperlipidemia  - Continue PTA Atorvastatin 40 mg     # Primary Hyperparathyroidism  # Hypercalcemia, hypophosphoremia   Increased Ca level to 10.9 and decreased Ph level to 2.3 in the ED   - Consulted endocrinology, who started cinacalcet 30 mg BID on 10/13  - 10/16 endocrinology recommended continuing to trend calcium and albumin to make sure patient does not become hypocalcemic and recommended holding cinacalcet   - 10/17, calcium and albumin wnl, endo recommended cinacalcet 30 mg once daily   - 10/21, per endo continue cincaclcet and recheck calcium and albumin on October 24  - 10/23: per endo, patient needs to follow up outpatient for further management of hyperparathyroidism     # Rhinorrhea  1/15  currently multiple COVID infections on unit. No other symptoms of COVID noted. COVID PCR - on 1/13.  - COVID PCR today (1/15)    Medical course: Patient was physically examined by the ED prior to being transferred to the unit and was found to be medically stable and appropriate for admission.      Consults: Psychiatry, Endocrinology (follow-up of hyperthyroidism / hypercalcemia and hypertension), neurology     Checklist     Legal Status: Committed   Ortega meds: Haldol, Clozaril, Risperdal, Invega, Zyprexa, Seroquel, Abilify    Safety Assessment:   Behavioral Orders   Procedures    Code 1 - Restrict to Unit    Routine Programming     As clinically indicated    Status 15     Every 15 minutes.    Status Individual Observation     Patient SIO status reviewed with team/RN.  Please also refer to RN/team documentation for add'l detail.    -SIO staff to monitor following which have contributed to patient being on SIO:  Severe intrusiveness towards peers, verbal and physical agitation    -Possible interventions SIO staff could use to support patient's treatment progress:  Verbal deescalation, offer PRNs   -When following observed, team will review discontinuation of SIO:  Clinical improvement     Order Specific Question:   CONTINUOUS 24 hours / day     Answer:   Other     Order Specific Question:   Specify distance     Answer:   20 feet     Order Specific Question:   Indications for SIO     Answer:   Severe intrusiveness       Risk Assessment:  Risk for harm is low  Risk factors: impulsive and past behaviors  Protective factors: family      SIO: Discontinue at 1400 today. Previously 20 ft for intrusiveness and agitation    Disposition: Pending assessment for memory care facility. Assessment planned for Friday 1/17 or Monday 1/20.     Attestations   Estevan Aaron was seen and discussed with attending psychiatrist Dr. Pete Smith.     Pb Rust, MS3  [facility]    I was present with the medical student  who participated in the service and in the documentation of the note. I have verified the history and personally performed the exam and medical decision making. I agree with the assessment and plan of care as documented in the note. Pete Smith M.D., Ph.D.

## 2025-01-15 NOTE — PLAN OF CARE
Team Note Due:  Monday    Assessment/Intervention/Current Symtoms and Care Coordination:  Chart review and met with team, discussed pt progress, symptomology, and response to treatment.  Discussed the discharge plan and any potential impediments to discharge. Clifford Aaron is currently emergency guardian/conservator until 01/20/2025.      Discharge Plan or Goal:  Memory care facility     Barriers to Discharge:  Patient requires further psychiatric stabilization due to current symptomology, medication management with changes subject to provider, coordination with outside supports, and aftercare planning. Pt is under civil commitment.     Referral Status:    Memory Care facilities currently in process:  [facility]. Tour completed 11/27.  [facility]. Tour completed 11/29.  [facility]. Tour completed 11/30. Per Clifford on 1/8, she would like to move forward with a referral. Records sent 1/8. RN assessment pending as of 1/14.  Vera (Exec Director): [phone]  [facility]. Tour scheduled 1/8/25. Have immediate openings and will accept EW.    Memory Care facilities pending family review:  [facility].  [facility].  [facility].  [facility].  [facility].    Memory Care facilities declined:  [facility] (private pay only, no EW).  [facility] (private pay only, no EW).  [facility] (no openings).  [facility]. Tour completed 11/29. Records sent 12/2/24. Declined 12/19/24 (don't feel they are an appropriate facility for pt's needs).  oLrraine (): [email or web address]; [phone]  Isatu (director of nursing): [email or web address]  [facility].  Denied 12/26 (concerned about dementia with psychosis, history of  hallucinations, high elopement risk, still on 1:1).  Nikki Noel: [email or web address]; Office [phone], Fax [phone]   [facility]. Not accepting EW as of 1/7/25.    Legal Status:  MI Commitment with [facility]  File Number: [id number]  Start and expiration date of commitment: 10/24/24 - 04/24/25    Ortega meds: Haldol, Clozaril, Risperdal, Invega, Zyprexa, Seroquel, Abilify    PPS/CM:  Shelby Chowdary: [phone]  [email or web address].    Contacts:  Maria C Aaron (Daughter): [phone]   Clifford Aaron (ex-wife): [phone]     No Del Angel (guardianship/conservatorship ): [phone]  [email or web address]    Derrell Duff ([place] ): [phone]  [email or web address]     Upcoming Meetings and Dates/Important Information and next steps:  Follow up with family regarding list of Memory Care facilities  Discharge planning when appropriate  Pt does not qualify for MA per financial counselor on 11/8/2024. Pt will likely privately pay for Memory Care until he qualifies for MA/waivers in the future.    Provisional Discharge and Change of Status needed at discharge

## 2025-01-15 NOTE — PLAN OF CARE
Problem: Sleep Disturbance  Goal: Adequate Sleep/Rest  Outcome: Progressing  Patient sleeps on and off this shift. No complaints of pain and discomfort. Patient continues on q15 minutes safety checks, no behavioral issues noted. Will continue to monitor the patient and provide therapeutic intervention as needed. Will continue with current plan of care. Notify MD with any concerns. The patient had 5.5 total hours of sleep this shift.  Patient on status individual observation with 1 staffing, and space restriction for safety, to prevent/manage severe intrusiveness, assaultive behavior, to mitigate safety risks.

## 2025-01-15 NOTE — PLAN OF CARE
Pt was visible in the milieu watching tv in the lounge. Pt was calm and interactive with select peers. Pt presents with intermittent confusion. Presents with flat affect. Denies all mental health symptoms. Compliant with medications. No prn administered this shift. Fluid and food intake was adequate. Vital signs: Temp: 97.7  F (36.5  C) Temp src: Oral BP: (!) 135/94 Pulse: 59   Resp: 16 SpO2: 96 % O2 Device: None (Room air)     Goal Outcome Evaluation:    Plan of Care Reviewed With: patient      Problem: Psychotic Signs/Symptoms  Goal: Improved Behavioral Control (Psychotic Signs/Symptoms)  Intervention: Manage Behavior  Recent Flowsheet Documentation  Taken 1/14/2025 1700 by Noah Murray RN  De-Escalation Techniques:   verbally redirected   reoriented     Problem: Psychotic Signs/Symptoms  Goal: Improved Mood Symptoms (Psychotic Signs/Symptoms)  Intervention: Optimize Emotion and Mood  Recent Flowsheet Documentation  Taken 1/14/2025 1700 by Noah Murray RN  Diversional Activity: television  Intervention: Optimize Emotion and Mood  Recent Flowsheet Documentation  Taken 1/14/2025 1700 by Noah Murray RN  Diversional Activity: television     Problem: Depression  Goal: Improved Mood  Outcome: Progressing     Problem: Suicidal Behavior  Goal: Suicidal Behavior is Absent or Managed  Outcome: Progressing

## 2025-01-15 NOTE — PLAN OF CARE
"Pt denies SI.  Pt had some runny nose this am.  VSS, temp normal.  MD's wanted pt to do COVID swab this staff attempted several times to do.  1st time pt said the swab going to give him COVID. Showed pt that the swab came form a sterile package.  Attempted to get later pt stated get that thing (swab) away from me.  Pt has been in lounge most of shift.  Pt watching TV, drinking coffee.  Pt did not attend any groups.  Pt had no bx issues this shift.  Pt at times appears confused and  appear to have difficulty finding words to say.      Problem: Adult Behavioral Health Plan of Care  Goal: Plan of Care Review  1/15/2025 1430 by Catherine Szymanski RN  Outcome: Not Progressing  1/15/2025 1344 by Catherine Szymanski RN  Outcome: Not Progressing  Goal: Patient-Specific Goal (Individualization)  Description: You can add care plan individualizations to a care plan. Examples of Individualization might be:  \"Parent requests to be called daily at 9am for status\", \"I have a hard time hearing out of my right ear\", or \"Do not touch me to wake me up as it startles  me\".  1/15/2025 1430 by Catherine Szymanski RN  Outcome: Not Progressing  1/15/2025 1344 by Catherine Szymanski RN  Outcome: Not Progressing  Goal: Adheres to Safety Considerations for Self and Others  1/15/2025 1430 by Catherine Szymanski RN  Outcome: Not Progressing  1/15/2025 1344 by Catherine Szymanski RN  Outcome: Not Progressing  Intervention: Develop and Maintain Individualized Safety Plan  Recent Flowsheet Documentation  Taken 1/15/2025 1058 by Catherine Szymanski RN  Safety Measures:   1:1 observation maintained   safety rounds completed   environmental rounds completed  Intervention: Develop and Maintain Individualized Safety Plan  Recent Flowsheet Documentation  Taken 1/15/2025 1058 by Catherine Szymanski RN  Safety Measures:   1:1 observation maintained   safety rounds completed   environmental rounds completed  Goal: Absence of New-Onset Illness or Injury  1/15/2025 1430 by Catherine Szymanski, " RN  Outcome: Not Progressing  1/15/2025 1344 by Catherine Szymanski RN  Outcome: Not Progressing  Intervention: Identify and Manage Fall Risk  Recent Flowsheet Documentation  Taken 1/15/2025 1058 by Catherine Szymanski RN  Safety Measures:   1:1 observation maintained   safety rounds completed   environmental rounds completed  Goal: Optimized Coping Skills in Response to Life Stressors  1/15/2025 1430 by Catherine Szymanski RN  Outcome: Not Progressing  1/15/2025 1344 by Catherine Szymanski RN  Outcome: Not Progressing  Goal: Develops/Participates in Therapeutic Ferguson to Support Successful Transition  1/15/2025 1430 by Catherine Szymanski RN  Outcome: Not Progressing  1/15/2025 1344 by Catherine Szymanski RN  Outcome: Not Progressing     Problem: Psychotic Signs/Symptoms  Goal: Improved Behavioral Control (Psychotic Signs/Symptoms)  1/15/2025 1430 by Catherine Szymanski RN  Outcome: Not Progressing  1/15/2025 1344 by Catherine Szymanski RN  Outcome: Not Progressing  Goal: Optimal Cognitive Function (Psychotic Signs/Symptoms)  1/15/2025 1430 by Catherine Szymanski RN  Outcome: Not Progressing  1/15/2025 1344 by Catherine Szymanski RN  Outcome: Not Progressing  Goal: Increased Participation and Engagement (Psychotic Signs/Symptoms)  1/15/2025 1430 by Catherine Szymanski RN  Outcome: Not Progressing  1/15/2025 1344 by Catherine Szymanski RN  Outcome: Not Progressing  Goal: Improved Mood Symptoms (Psychotic Signs/Symptoms)  1/15/2025 1430 by Catherine Szymanski RN  Outcome: Not Progressing  1/15/2025 1344 by Catherine Szymanski RN  Outcome: Not Progressing  Goal: Improved Psychomotor Symptoms (Psychotic Signs/Symptoms)  1/15/2025 1430 by Catherine Szymanski RN  Outcome: Not Progressing  1/15/2025 1344 by Catherine Szymanski RN  Outcome: Not Progressing  Goal: Decreased Sensory Symptoms (Psychotic Signs/Symptoms)  1/15/2025 1430 by Catherine Szymanski RN  Outcome: Not Progressing  1/15/2025 1344 by Catherine Szymanski RN  Outcome: Not Progressing  Goal: Improved Sleep (Psychotic Signs/Symptoms)  1/15/2025  1430 by Catherine Szymanski RN  Outcome: Not Progressing  1/15/2025 1344 by Catherine Szymanski RN  Outcome: Not Progressing  Goal: Enhanced Social, Occupational or Functional Skills (Psychotic Signs/Symptoms)  1/15/2025 1430 by Catherine Szymanski RN  Outcome: Not Progressing  1/15/2025 1344 by Catherine Szymanski RN  Outcome: Not Progressing     Problem: Depression  Goal: Improved Mood  1/15/2025 1430 by Catherine Szymanski RN  Outcome: Not Progressing  1/15/2025 1344 by Catherine Szymanski RN  Outcome: Not Progressing     Problem: Suicidal Behavior  Goal: Suicidal Behavior is Absent or Managed  1/15/2025 1430 by Catherine Szymanski RN  Outcome: Not Progressing  1/15/2025 1344 by Catherine Szymanski RN  Outcome: Not Progressing     Problem: Sleep Disturbance  Goal: Adequate Sleep/Rest  1/15/2025 1430 by Catherine Szymanski RN  Outcome: Not Progressing  1/15/2025 1344 by Catherine Szymanski RN  Outcome: Not Progressing     Problem: Seclusion/Restraint, Behavioral  Goal: Seclusion/Behavioral Restraint Goal: Absence of Harm or Injury  1/15/2025 1430 by Catherine Szymanski RN  Outcome: Not Progressing  1/15/2025 1344 by Catherine Szymanski RN  Outcome: Not Progressing  Intervention: Implement Least Restrictive Safety Strategies  Recent Flowsheet Documentation  Taken 1/15/2025 1058 by Catherine Szymanski RN  Safety Measures:   1:1 observation maintained   safety rounds completed   environmental rounds completed     Problem: Pain Acute  Goal: Optimal Pain Control and Function  1/15/2025 1430 by Catherine Szymanski RN  Outcome: Not Progressing  1/15/2025 1344 by Catherine Szymanski RN  Outcome: Not Progressing     Problem: Manic or Hypomanic Signs/Symptoms  Goal: Improved Impulse Control (Manic/Hypomanic Signs/Symptoms)  1/15/2025 1430 by Catherine Szymanski RN  Outcome: Not Progressing  1/15/2025 1344 by Catherine Szymanski RN  Outcome: Not Progressing  Goal: Optimized Cognitive Function (Manic/Hypomanic Signs/Symptoms)  1/15/2025 1430 by Catherine Szymanski RN  Outcome: Not Progressing  1/15/2025 1344 by  Catherine Szymanski RN  Outcome: Not Progressing  Goal: Improved Mood Symptoms (Manic/Hypomanic Signs/Symptoms)  1/15/2025 1430 by Catherine Szymanski RN  Outcome: Not Progressing  1/15/2025 1344 by Catherine Szymanski RN  Outcome: Not Progressing  Goal: Optimized Nutrition Intake (Manic or Hypomanic Signs/Symptoms)  1/15/2025 1430 by Catherine Szymanski RN  Outcome: Not Progressing  1/15/2025 1344 by Catherine Szymanski RN  Outcome: Not Progressing  Goal: Improved Psychomotor Symptoms (Manic/Hypomanic Signs/Symptoms)  1/15/2025 1430 by Catherine Szymanski RN  Outcome: Not Progressing  1/15/2025 1344 by Catherine Szymanski RN  Outcome: Not Progressing  Goal: Improved Sleep (Manic/Hypomanic Signs/Symptoms)  1/15/2025 1430 by Catherine Szymanski RN  Outcome: Not Progressing  1/15/2025 1344 by Catherine Szymanski RN  Outcome: Not Progressing  Goal: Enhanced Social, Occupational or Functional Skills (Manic/Hypomanic Signs/Symptoms)  1/15/2025 1430 by Catherine Szymanski RN  Outcome: Not Progressing  1/15/2025 1344 by Catherine Szymanski RN  Outcome: Not Progressing   Goal Outcome Evaluation:

## 2025-01-15 NOTE — PLAN OF CARE
BEH IP Unit Acuity Rating Score (UARS)  Patient is given one point for every criteria they meet.    CRITERIA SCORING   On a 72 hour hold, court hold, committed, stay of commitment, or revocation. 1    Patient LOS on BEH unit exceeds 20 days. 1  LOS: 95   Patient under guardianship, 55+, otherwise medically complex, or under age 11. 1   Suicide ideation without relief of precipitating factors. 0   Current plan for suicide. 0   Current plan for homicide. 0   Imminent risk or actual attempt to seriously harm another without relief of factors precipitating the attempt. 1   Severe dysfunction in daily living (ex: complete neglect for self care, extreme disruption in vegetative function, extreme deterioration in social interactions). 1   Recent (last 7 days) or current physical aggression in the ED or on unit. 0   Restraints or seclusion episode in past 72 hours. 0   Recent (last 7 days) or current verbal aggression, agitation, yelling, etc., while in the ED or unit. 0   Active psychosis. 1   Need for constant or near constant redirection (from leaving, from others, etc).  0   Intrusive or disruptive behaviors. 1   Patient requires 3 or more hours of individualized nursing care per 8-hour shift (i.e. for ADLs, meds, therapeutic interventions). 1   TOTAL 8

## 2025-01-16 PROCEDURE — 250N000013 HC RX MED GY IP 250 OP 250 PS 637

## 2025-01-16 PROCEDURE — 87635 SARS-COV-2 COVID-19 AMP PRB: CPT

## 2025-01-16 PROCEDURE — 124N000002 HC R&B MH UMMC

## 2025-01-16 PROCEDURE — 99231 SBSQ HOSP IP/OBS SF/LOW 25: CPT | Mod: GC | Performed by: PSYCHIATRY & NEUROLOGY

## 2025-01-16 RX ADMIN — CLONIDINE HYDROCHLORIDE 0.1 MG: 0.1 TABLET ORAL at 20:37

## 2025-01-16 RX ADMIN — LISINOPRIL 40 MG: 10 TABLET ORAL at 08:13

## 2025-01-16 RX ADMIN — CLONIDINE HYDROCHLORIDE 0.1 MG: 0.1 TABLET ORAL at 08:13

## 2025-01-16 RX ADMIN — SENNOSIDES AND DOCUSATE SODIUM 1 TABLET: 50; 8.6 TABLET ORAL at 08:13

## 2025-01-16 RX ADMIN — FUROSEMIDE 40 MG: 40 TABLET ORAL at 08:13

## 2025-01-16 RX ADMIN — Medication 25 MG: at 20:37

## 2025-01-16 RX ADMIN — OLANZAPINE 5 MG: 5 TABLET, ORALLY DISINTEGRATING ORAL at 20:37

## 2025-01-16 RX ADMIN — AMLODIPINE BESYLATE 10 MG: 5 TABLET ORAL at 08:13

## 2025-01-16 RX ADMIN — ATORVASTATIN CALCIUM 40 MG: 20 TABLET, FILM COATED ORAL at 08:13

## 2025-01-16 RX ADMIN — CINACALCET 30 MG: 30 TABLET ORAL at 08:13

## 2025-01-16 RX ADMIN — MELATONIN TAB 3 MG 3 MG: 3 TAB at 20:37

## 2025-01-16 RX ADMIN — Medication 1 PATCH: at 08:22

## 2025-01-16 RX ADMIN — METOPROLOL SUCCINATE 50 MG: 50 TABLET, EXTENDED RELEASE ORAL at 08:13

## 2025-01-16 ASSESSMENT — ACTIVITIES OF DAILY LIVING (ADL)
ADLS_ACUITY_SCORE: 66
LAUNDRY: WITH SUPERVISION
ADLS_ACUITY_SCORE: 66
HYGIENE/GROOMING: INDEPENDENT
ADLS_ACUITY_SCORE: 66
DRESS: INDEPENDENT
ADLS_ACUITY_SCORE: 66
ORAL_HYGIENE: INDEPENDENT
ADLS_ACUITY_SCORE: 66

## 2025-01-16 NOTE — PLAN OF CARE
Problem: Adult Behavioral Health Plan of Care  Goal: Adheres to Safety Considerations for Self and Others  Intervention: Develop and Maintain Individualized Safety Plan  Recent Flowsheet Documentation  Taken 2025 0900 by Vera Graham RN  Safety Measures:   environmental rounds completed   safety rounds completed   Goal Outcome Evaluation:    Plan of Care Reviewed With: patient     Patient presented with a flat and blunted affect. Patient denied pain or any physical discomfort. He denied all psych mental health symptoms and contracted for safety. Patient continues to refuse the Covid test after trying multiple times. Another staff also tried X2 to encourage patient to do his Covid test, patient still declined. Patient's hygiene is unkempt. He is eating and drinking adequately. During the course of the shift, the provider ordered SIO for patient due to severe intrusiveness. This is to  0700 in the morning at shift change tomorrow, 2025.  There was no behavior escalations or safety concerns noted this shift. Patient was mostly sitting in his favorite [place] [facility] [place] He was socializing with selected peers. He was also sleeping on and off. There was no behavior escalations or safety concerns noted this shift. Will continue current plan of care and notify provider of any concerns.  BP (!) 156/71 (BP Location: Left arm, Patient Position: Sitting, Cuff Size: Adult Regular)   Pulse 58   Temp 97.7  F (36.5  C) (Oral)   Resp 18   Wt 113.7 kg (250 lb 9.6 oz)   SpO2 96%   BMI 40.45 kg/m

## 2025-01-16 NOTE — PLAN OF CARE
Team Note Due:  Monday    Assessment/Intervention/Current Symtoms and Care Coordination:  Chart review and met with team, discussed pt progress, symptomology, and response to treatment.  Discussed the discharge plan and any potential impediments to discharge. Clifford Aaron is currently emergency guardian/conservator until 01/20/2025.    Left vm for Vera with [facility] [employer] inquiring about pending RN assessment.    Coordinated with financial clearance team regarding Medicare coverage. Pt is now covered starting Jan 1.    Discharge Plan or Goal:  Memory care facility     Barriers to Discharge:  Patient requires further psychiatric stabilization due to current symptomology, medication management with changes subject to provider, coordination with outside supports, and aftercare planning. Pt is under civil commitment.     Referral Status:    Memory Care facilities currently in process:  [facility]. Tour completed 11/27.  [facility]. Tour completed 11/29.  [facility]. Tour completed 11/30. Per Clifford on 1/8, she would like to move forward with a referral. Records sent 1/8. RN assessment pending as of 1/14.  Vera (Exec Director): [phone]  [facility]. Tour scheduled 1/8/25. Have immediate openings and will accept EW.    Memory Care facilities pending family review:  [facility].  [facility].  [facility].  [facility].  [facility].    Memory Care facilities declined:  [facility] (private pay only, no EW).  [facility] [address] (private pay only, no EW).  [facility] (no openings).  [facility]. Tour completed 11/29. Records sent 12/2/24. Declined 12/19/24 (don't feel they are an appropriate facility for pt's needs).  Lorraine (): [email or web address]; [phone]  [phone]  Isatu (director of nursing): [email or web address]  [facility].  Denied 12/26 (concerned about dementia with psychosis, history of hallucinations, high elopement risk, still on 1:1).  Nikki Noel: [email or web address]; Office [phone], Fax [phone]   [facility]. Not accepting EW as of 1/7/25.    Legal Status:  MI Commitment with [facility]  File Number: [id number]  Start and expiration date of commitment: 10/24/24 - 04/24/25    [facility] meds: Haldol, Clozaril, Risperdal, Invega, Zyprexa, Seroquel, Abilify    PPS/CM:  Shelby Chowdary: [phone]  [email or web address]    Contacts:  Maria C Aaron (Daughter): [phone]   Clifford Aaron (ex-wife): [phone]     No Del Angel (guardianship/conservatorship ): [phone]  [email or web address]    Derrell Duff ([place] ): [phone]  [email or web address]     Upcoming Meetings and Dates/Important Information and next steps:  Follow up with family regarding list of Memory Care facilities  Discharge planning when appropriate  Pt does not qualify for MA per financial counselor on 11/8/2024. Pt will likely privately pay for Memory Care until he qualifies for MA/waivers in the future.    Provisional Discharge and Change of Status needed at discharge

## 2025-01-16 NOTE — PLAN OF CARE
BEH IP Unit Acuity Rating Score (UARS)  Patient is given one point for every criteria they meet.    CRITERIA SCORING   On a 72 hour hold, court hold, committed, stay of commitment, or revocation. 1    Patient LOS on BEH unit exceeds 20 days. 1  LOS: 96   Patient under guardianship, 55+, otherwise medically complex, or under age 11. 1   Suicide ideation without relief of precipitating factors. 0   Current plan for suicide. 0   Current plan for homicide. 0   Imminent risk or actual attempt to seriously harm another without relief of factors precipitating the attempt. 1   Severe dysfunction in daily living (ex: complete neglect for self care, extreme disruption in vegetative function, extreme deterioration in social interactions). 1   Recent (last 7 days) or current physical aggression in the ED or on unit. 0   Restraints or seclusion episode in past 72 hours. 0   Recent (last 7 days) or current verbal aggression, agitation, yelling, etc., while in the ED or unit. 0   Active psychosis. 1   Need for constant or near constant redirection (from leaving, from others, etc).  0   Intrusive or disruptive behaviors. 1   Patient requires 3 or more hours of individualized nursing care per 8-hour shift (i.e. for ADLs, meds, therapeutic interventions). 1   TOTAL 8

## 2025-01-16 NOTE — PLAN OF CARE
"  Problem: Sleep Disturbance  Goal: Adequate Sleep/Rest  Outcome: Progressing     Pt appears to have slept for 5.25 hours.  Pt was asleep in his room at the start of the shift. He slept intermittently in his room and the lounge area. Pt refused redirection to sleep in his room for the most part of the shift. At 0600, pt was slightly confused and stated, \" I left some things in my truck and I want to get them,\" He was redirected, and redirection was effective. He denies pain, anxiety, depression, and SI/SIB. No PRN medications were given. 15 minutes safety checks were in place. Staff will continue to offer support to pt.                         "

## 2025-01-16 NOTE — PLAN OF CARE
Pt was out in the lounge this shift watching movies with peers. Pt presents with flat affect. Pt was confused and agitated earlier this evening. Pt was asking for his ipad and accusing staff of losing it. Pt yelled at staff when staff attempted  to explain about the ipad and pt did not seem to process anything he was told. Pt kept on fixated on the Ipad but after after awhile before calming down on himself. Pt remained calm for the rest of the shift. Took his medications without any resistance. No prns administered. Pt refused vitals taken.    Goal Outcome Evaluation:    Plan of Care Reviewed With: patient      Problem: Psychotic Signs/Symptoms  Goal: Improved Behavioral Control (Psychotic Signs/Symptoms)  Intervention: Manage Behavior  Recent Flowsheet Documentation  Taken 1/15/2025 1751 by Noah Murray RN  De-Escalation Techniques:   verbally redirected   reoriented     Problem: Depression  Goal: Improved Mood  Outcome: Progressing     Problem: Suicidal Behavior  Goal: Suicidal Behavior is Absent or Managed  Outcome: Progressing

## 2025-01-16 NOTE — PROGRESS NOTES
----------------------------------------------------------------------------------------------------------  [facility]  Psychiatry Progress Note  Hospital Day #96     Interim History:     The patient's care was discussed with the treatment team and chart notes were reviewed.    Identifier: Estevan Aaron is a 64 year old male with previous psychiatric diagnoses of  generalized anxiety disorder, Neurocognitive disorder, admitted from the ED 10/12/2024 due to concern for HI and psychosis in the context of medical issues (hyperthyroidism, hypercalcemia) psychosocial stressors including family dynamics with recent possible divorce.    Sleep: 5.25 hours (01/16/25 0600)  Scheduled medications: Took all scheduled medications as prescribed  Psychiatric PRN medications: None    Staff Report:   No acute events overnight. In summary he has continued to be confused and was breifly agitated last night about his iPad although he subsequently moved on. He unfortunately refused the COVID swab despite multiple attempts. He slept 5.25 hours. See staff notes for details.      Subjective:     Patient Interview:  Santos was interviewed in the milieu. Reports that the night was scrambled. No runny nose, sore throat, or cough. Discussed with him the need to keep his comments to himself and to be unobtrusive. He has no memory of being intrusive. Frustrated by team's questions and wishing to be left alone.     ROS:  See above     Objective:     Vitals:  BP (!) 156/71 (BP Location: Left arm, Patient Position: Sitting, Cuff Size: Adult Regular)   Pulse 58   Temp 97.7  F (36.5  C) (Oral)   Resp 18   Wt 113.7 kg (250 lb 9.6 oz)   SpO2 96%   BMI 40.45 kg/m      Allergies:  No Known Allergies    Current Medications:  Scheduled:  Current Facility-Administered Medications   Medication Dose Route Frequency Provider Last Rate Last Admin    acetaminophen (TYLENOL) tablet 650 mg  650 mg Oral Q4H PRN  Nikki Caceres MD   650 mg at 01/09/25 0228    alum & mag hydroxide-simethicone (MAALOX) suspension 30 mL  30 mL Oral Q4H PRN Nikki Caceres MD   30 mL at 10/17/24 0837    amLODIPine (NORVASC) tablet 10 mg  10 mg Oral Daily Presley Barrera MD   10 mg at 01/16/25 0813    atorvastatin (LIPITOR) tablet 40 mg  40 mg Oral Daily Nikki Caceres MD   40 mg at 01/16/25 0813    cholecalciferol (VITAMIN D3) capsule 1,250 mcg  1,250 mcg Oral Q7 Days Evelia Grimm DO   1,250 mcg at 01/14/25 1053    cinacalcet (SENSIPAR) tablet 30 mg  30 mg Oral Daily Presley Barrera MD   30 mg at 01/16/25 0813    cloNIDine (CATAPRES) tablet 0.1 mg  0.1 mg Oral BID Presley Barrera MD   0.1 mg at 01/16/25 0813    diclofenac (VOLTAREN) 1 % topical gel 2 g  2 g Topical 4x Daily PRN Rocío Lopez MD   2 g at 12/22/24 2032    furosemide (LASIX) tablet 40 mg  40 mg Oral Daily Presley Barrera MD   40 mg at 01/16/25 0813    gabapentin (NEURONTIN) capsule 100 mg  100 mg Oral Q6H PRN Nikki Caceres MD   100 mg at 12/24/24 0046    hydrocortisone (CORTAID) 0.5 % cream   Topical Daily PRN Savi Chamorro MD        Lidocaine (LIDOCARE) 4 % Patch 1 patch  1 patch Transdermal Q24H PRN Rocío Lopez MD   1 patch at 12/22/24 2023    lisinopril (ZESTRIL) tablet 40 mg  40 mg Oral Daily Presley Barrera MD   40 mg at 01/16/25 0813    melatonin tablet 3 mg  3 mg Oral At Bedtime Presley Barrera MD   3 mg at 01/15/25 2010    metoprolol succinate ER (TOPROL XL) 24 hr tablet 50 mg  50 mg Oral Daily Presley Barrera MD   50 mg at 01/16/25 0813    nicotine (NICODERM CQ) 14 MG/24HR 24 hr patch 1 patch  1 patch Transdermal Daily SirishaEvelia, DO   1 patch at 01/16/25 0822    nicotine (NICORETTE) gum 2 mg  2 mg Buccal Q1H PRN González Garcia MD   2 mg at 10/24/24 1254    OLANZapine zydis (zyPREXA) ODT tab 5 mg  5 mg Oral At Bedtime González Garcia MD   5 mg at 01/15/25 2010    Or    OLANZapine (zyPREXA)  injection 10 mg  10 mg Intramuscular At Bedtime González Garcia MD   10 mg at 01/12/25 2211    OLANZapine (zyPREXA) tablet 5 mg  5 mg Oral TID PRN Evelia Grimm DO   5 mg at 01/05/25 1645    Or    OLANZapine (zyPREXA) injection 5 mg  5 mg Intramuscular TID PRN Evelia Grimm DO        polyethylene glycol (MIRALAX) Packet 17 g  17 g Oral Daily PRN Nikki Caceres MD        senna-docusate (SENOKOT-S/PERICOLACE) 8.6-50 MG per tablet 1 tablet  1 tablet Oral Daily Evelia Grimm DO   1 tablet at 01/16/25 0813    traZODone (DESYREL) half-tab 25 mg  25 mg Oral At Bedtime Rocío Lopez MD   25 mg at 01/15/25 2010    traZODone (DESYREL) half-tab 25 mg  25 mg Oral At Bedtime PRN Evelia Grimm DO   25 mg at 12/24/24 0046       PRN:  Current Facility-Administered Medications   Medication Dose Route Frequency Provider Last Rate Last Admin    acetaminophen (TYLENOL) tablet 650 mg  650 mg Oral Q4H PRN Nikki Caceres MD   650 mg at 01/09/25 0228    alum & mag hydroxide-simethicone (MAALOX) suspension 30 mL  30 mL Oral Q4H PRN Nikki Caceres MD   30 mL at 10/17/24 0837    amLODIPine (NORVASC) tablet 10 mg  10 mg Oral Daily Presley Barrera MD   10 mg at 01/16/25 0813    atorvastatin (LIPITOR) tablet 40 mg  40 mg Oral Daily Nikki Caceres MD   40 mg at 01/16/25 0813    cholecalciferol (VITAMIN D3) capsule 1,250 mcg  1,250 mcg Oral Q7 Days Evelia Grimm DO   1,250 mcg at 01/14/25 1053    cinacalcet (SENSIPAR) tablet 30 mg  30 mg Oral Daily Presley Barrera MD   30 mg at 01/16/25 0813    cloNIDine (CATAPRES) tablet 0.1 mg  0.1 mg Oral BID Presley Barerra MD   0.1 mg at 01/16/25 0813    diclofenac (VOLTAREN) 1 % topical gel 2 g  2 g Topical 4x Daily PRN Rocío Lopez MD   2 g at 12/22/24 2032    furosemide (LASIX) tablet 40 mg  40 mg Oral Daily Presley Barrera MD   40 mg at 01/16/25 0813    gabapentin (NEURONTIN) capsule 100 mg  100 mg Oral Q6H PRN Nikki Caceres MD   100 mg at  12/24/24 0046    hydrocortisone (CORTAID) 0.5 % cream   Topical Daily PRN Savi Chamorro MD        Lidocaine (LIDOCARE) 4 % Patch 1 patch  1 patch Transdermal Q24H PRN Rocío Lopez MD   1 patch at 12/22/24 2023    lisinopril (ZESTRIL) tablet 40 mg  40 mg Oral Daily Presley Barrera MD   40 mg at 01/16/25 0813    melatonin tablet 3 mg  3 mg Oral At Bedtime Presley Barrera MD   3 mg at 01/15/25 2010    metoprolol succinate ER (TOPROL XL) 24 hr tablet 50 mg  50 mg Oral Daily Presley Barrera MD   50 mg at 01/16/25 0813    nicotine (NICODERM CQ) 14 MG/24HR 24 hr patch 1 patch  1 patch Transdermal Daily Evelia Grimm DO   1 patch at 01/16/25 0822    nicotine (NICORETTE) gum 2 mg  2 mg Buccal Q1H PRN González Garcia MD   2 mg at 10/24/24 1254    OLANZapine zydis (zyPREXA) ODT tab 5 mg  5 mg Oral At Bedtime González Garcia MD   5 mg at 01/15/25 2010    Or    OLANZapine (zyPREXA) injection 10 mg  10 mg Intramuscular At Bedtime González Garcia MD   10 mg at 01/12/25 2211    OLANZapine (zyPREXA) tablet 5 mg  5 mg Oral TID PRN Evelia Grimm DO   5 mg at 01/05/25 1645    Or    OLANZapine (zyPREXA) injection 5 mg  5 mg Intramuscular TID PRN Evelia Grimm DO        polyethylene glycol (MIRALAX) Packet 17 g  17 g Oral Daily PRN Nikki Caceres MD        senna-docusate (SENOKOT-S/PERICOLACE) 8.6-50 MG per tablet 1 tablet  1 tablet Oral Daily Evelia Grimm DO   1 tablet at 01/16/25 0813    traZODone (DESYREL) half-tab 25 mg  25 mg Oral At Bedtime Rocío Lopez MD   25 mg at 01/15/25 2010    traZODone (DESYREL) half-tab 25 mg  25 mg Oral At Bedtime PRN Sirisha, Evelia, DO   25 mg at 12/24/24 0046     Labs and Imaging:  Data this admission:  - CBC unremarkable  - CMP unremarkable  - TSH normal  - UDS negative  - Vit D low  - Hgb A1c 5.9 (10/13/24)  - Lipids unremarkable  - Vit B12 normal  - Folate normal  - Urinalysis unremarkable  - EKG normal sinus rhythm, QTc 390 ms  - Head CT  "showed no acute changes  - HIV non reactive  - Treponema antibody non reactive  - ESR wnl   - Ceruloplasmin wnl   - BOOGIE negative  - Lyme negative      Parathyroid hormone:   85 (10/13)     Calcium:  11.4 (10/14) -> 10.3 (10/16) -> 9.8 (10/19) -> 10.6 (10/21) -> 10.3 (10/23)     Albumin:  4.3 (10/14) -->  4.3 (10/16) -> 4.1 (10/17) -> 4.2 (10/19) -> 4.5 (10/21) -> 4.3 (10/23)      Mental Status Exam:   Oriented to:  Person/Self  General:  Awake, Alert, and Confused  Appearance:  appears stated age, Grooming is adequate, Dressed in own clothing, and overweight  Behavior/Attitude:  Disengaged, Refuses to participate, Accusatory, Cursing, Hostile, and Defensive  Eye Contact: Intense or Glaring  Psychomotor: Normal no catatonia present  Speech:  loud volume/tone  Language: Fluent in English with appropriate syntax and vocabulary.  Mood:  \"Fine, scrambled\"  Affect:  labile, angry, hostile, and irritable  Thought Process:  rambling and confused  Thought Content:   No SI/HI/AH/VH; No apparent delusions  Associations:  loose  Insight:  impaired due to neurocognitive disorder  Judgment:  impaired due to neurocognitive disorder  Impulse control: impaired  Attention Span:  inattentive  Concentration:   impaired by neurocognitive disorder  Recent and Remote Memory:   remote memory intact, recent memory impaired  Fund of Knowledge: average  Muscle Strength and Tone: normal  Gait and Station: Normal     Psychiatric Assessment     Estevan Aaron is a 64 year old male with previous psychiatric diagnoses of GEORGINA admitted from the ER on 10/12/2024 due to concern for HI and psychosis in the context of medical issues (hyperthyroidism, hypercalcemia), psychosocial stressors including with recent divorce and selling his home. This is the patient's first psychiatric hospitalization. The MSE on admission was pertinent for a thought process which was perseverative, circumstantial, tangential, disorganized and tangential; with rambling and " looseness of associations. Psychological contributions to presentation included lack of insight. Social factors contributing to presentation included isolation, recent divorce, selling his house, and moving from hotel to hotel. Biological contributions to presentation included a history of hyperparathyroidism with chronic hypercalcemia per charts as well as a history of methamphetamine use per collateral from ex-wife.     Per collateral from ex-wife, Santos has had paranoid ideations since they first met. He has always felt like people are out to get him or trying to rip him off. She says that he has had visual hallucinations (he will point things out that the rest of the family can't see) for a long time, but the family would just go along with him to avoid making him angry. This timeline and presentation could be consistent with diagnosis of paranoid personality disorder. Things began to worsen around the beginning of the COVID pandemic, when Santos became more isolated and the family started to notice cognitive issues such as impaired memory. Other things that could be contributing to his presentation is hyperparathyroidism with hypercalcemia. However, patient's calcium returned to normal limits on 10/16, and patient continues to have disorganized behavior unrelated to calcium elevation, so likely this is not a significant contributing factor to patient's presentation.      Currently, Santos is at times disoriented, but easily re-directable. Presents with some difficulty of word articulation, which contributes to his frustration when interacting with psych team, as he finds it difficult to explain himself. He is able to take care of his ADLs without much assistance and he is mostly independent in the unit. On the other hand, his confused behavior tend to increase in the evenings, seemingly related to  Neurocognitive Disorder diagnosis, and this could evolved to be his new baseline. Either way, Santos does not present as a  danger to himself or other so his SIO was discontinued. Santos does not present with any new episodes of physical agitation and is able to attend groups and interact with peers without major altercations. Patient currently is stable with current neuroleptic dose, patient probably wont benefit from any modification in current therapy.     Family care meeting 01/02 with psych team: update on current availability of placement options and Santos's current presentation. Family aware of Santos's level of functionality with ADLs, besides  disorientation episodes at evenings, that only requires minimal intervention for redirection. Still pending visits to possible memory care placements by family.    Santos appeared more confused throughout the day, different from his usual baseline, per staff report. No changed in vital signs but concern for acute delirium. UA was requested.    Goal of treatment:  Procure a memory care placement.      Psychiatric Plan by Diagnosis     Today's changes:  - SIO for severe intrusiveness for 24 hours.    # Major Neurocognitive Disorder  1. Medications:  - Olanzapine 5 mg at bedtime     3. Additional Plans:  - Patient will be treated in therapeutic milieu with appropriate individual and group therapies as described  - Pending memory facility placement.      # Unspecified anxiety vs Generalized Anxiety Disorder  - Monitor for symptoms.  - Fluoxetine held due to suspicion of ongoing manic symptoms     Psychiatric Hospital Course:      Estevan Aaron was admitted to [facility] on a 72 hour hold.   Medications:  PTA fluoxetine was held due to concern for worsening of zelalem   New medications started at the time of admission include Zyprexa.   Increased olanzapine 10 mg at bedtime was to 10 mg BID (10/14)   Increased olanzapine 10 mg BID to 10 mg during day and 15 mg at bedtime (10/15)  10/21: increased olanzapine pm dose from 15 to 20 mg  10/23: started melatonin 3 mg at 7 pm to help patient with circadian rhythm  as he has been staying up throughout the night and sleeping a lot during the day and there is some concern for delirium   10/24: consulted anesthesia for MRI brain   10/28: MRI brain complete, decrease AM olanzapine from 10 to 5 mg  10/29: Morning olanzapine decreased to 2.5 mg, evening olanzapine decreased to 15 mg   11/1: Decreased evening olanzapine to 10 mg   11/4: Decreased evening olanzapine to 7.5 mg   11/5: Decreased evening olanzapine to 5 mg   11/11: Moved morning dose of olanzapine to the afternoon as patient appears more agitated in the afternoons/evenings  11/21: Started memantine 5 mg daily   11/26: Discontinued olanzapine 2.5 mg during the afternoon  12/2:   Discontinued memantine 5 mg, daily    The risks, benefits, alternatives, and side effects were discussed and understood by the patient and other caregivers.     Medical Assessment and Plan     Medical diagnoses to be addressed this admission:    # Hip Pain  - New right hip pain, and mild pain in multiple joints. Moderate response to topical antiinflammatory gel and lidocaine patch.   - Medicine consulted for recommendations 12/20    # CHF  # Hypertension  - Continue PTA medications  Furosemide 40 mg daily  Lisinopril 40 mg daily  Metoprolol 50 mg daily  Amlodipine 10 mg daily  Clonidine 0.1 mg BID  - Pitting edema in lower extremities, not painful 3+: Medicine consulted for recommendations 12/20  - Weight gain of about 8 pounds in about 2 months currently 249lb as of 01/07/25    # Hyperlipidemia  - Continue PTA Atorvastatin 40 mg     # Primary Hyperparathyroidism  # Hypercalcemia, hypophosphoremia   Increased Ca level to 10.9 and decreased Ph level to 2.3 in the ED   - Consulted endocrinology, who started cinacalcet 30 mg BID on 10/13  - 10/16 endocrinology recommended continuing to trend calcium and albumin to make sure patient does not become hypocalcemic and recommended holding cinacalcet   - 10/17, calcium and albumin wnl, endo recommended  cinacalcet 30 mg once daily   - 10/21, per endo continue cincaclcet and recheck calcium and albumin on October 24  - 10/23: per endo, patient needs to follow up outpatient for further management of hyperparathyroidism     # Rhinorrhea  1/15 currently multiple COVID infections on unit. No other symptoms of COVID noted. COVID PCR - on 1/13.    Medical course: Patient was physically examined by the ED prior to being transferred to the unit and was found to be medically stable and appropriate for admission.      Consults: Psychiatry, Endocrinology (follow-up of hyperthyroidism / hypercalcemia and hypertension), neurology     Checklist     Legal Status: Committed   [place] meds: Haldol, Clozaril, Risperdal, Invega, Zyprexa, Seroquel, Abilify    Safety Assessment:   Behavioral Orders   Procedures    Code 1 - Restrict to Unit    Routine Programming     As clinically indicated    Status 15     Every 15 minutes.       Risk Assessment:  Risk for harm is low  Risk factors: impulsive and past behaviors  Protective factors: family      SIO: No    Disposition: Pending assessment for memory care facility. Assessment planned for Friday 1/17 or Monday 1/20.     Attestations   Estevan Aaron was seen and discussed with attending psychiatrist Dr. Pete Smith.     Pb Rust, MS3  [facility]    I was present with the medical student who participated in the service and in the documentation of the note. I have verified the history and personally performed the exam and medical decision making. I agree with the assessment and plan of care as documented in the note. Pete Smith M.D., Ph.D.

## 2025-01-17 LAB — SARS-COV-2 RNA RESP QL NAA+PROBE: NEGATIVE

## 2025-01-17 PROCEDURE — 250N000013 HC RX MED GY IP 250 OP 250 PS 637

## 2025-01-17 PROCEDURE — 124N000002 HC R&B MH UMMC

## 2025-01-17 PROCEDURE — 99231 SBSQ HOSP IP/OBS SF/LOW 25: CPT | Mod: GC | Performed by: PSYCHIATRY & NEUROLOGY

## 2025-01-17 RX ADMIN — OLANZAPINE 5 MG: 5 TABLET, ORALLY DISINTEGRATING ORAL at 20:21

## 2025-01-17 RX ADMIN — AMLODIPINE BESYLATE 10 MG: 5 TABLET ORAL at 09:31

## 2025-01-17 RX ADMIN — SENNOSIDES AND DOCUSATE SODIUM 1 TABLET: 50; 8.6 TABLET ORAL at 09:30

## 2025-01-17 RX ADMIN — CLONIDINE HYDROCHLORIDE 0.1 MG: 0.1 TABLET ORAL at 09:31

## 2025-01-17 RX ADMIN — Medication 25 MG: at 20:22

## 2025-01-17 RX ADMIN — LISINOPRIL 40 MG: 10 TABLET ORAL at 09:30

## 2025-01-17 RX ADMIN — CLONIDINE HYDROCHLORIDE 0.1 MG: 0.1 TABLET ORAL at 20:21

## 2025-01-17 RX ADMIN — MELATONIN TAB 3 MG 3 MG: 3 TAB at 20:22

## 2025-01-17 RX ADMIN — CINACALCET 30 MG: 30 TABLET ORAL at 09:31

## 2025-01-17 RX ADMIN — METOPROLOL SUCCINATE 50 MG: 50 TABLET, EXTENDED RELEASE ORAL at 09:30

## 2025-01-17 RX ADMIN — Medication 1 PATCH: at 09:38

## 2025-01-17 RX ADMIN — FUROSEMIDE 40 MG: 40 TABLET ORAL at 09:29

## 2025-01-17 RX ADMIN — ATORVASTATIN CALCIUM 40 MG: 20 TABLET, FILM COATED ORAL at 09:31

## 2025-01-17 ASSESSMENT — ACTIVITIES OF DAILY LIVING (ADL)
ADLS_ACUITY_SCORE: 66
DRESS: STREET CLOTHES;INDEPENDENT
ADLS_ACUITY_SCORE: 66
LAUNDRY: WITH SUPERVISION
ADLS_ACUITY_SCORE: 66

## 2025-01-17 NOTE — PLAN OF CARE
"  Problem: Adult Behavioral Health Plan of Care  Goal: Plan of Care Review  Outcome: Progressing  Flowsheets  Taken 1/17/2025 1417  Plan of Care Reviewed With: patient  Taken 1/17/2025 1000  Patient Agreement with Plan of Care: agrees  Goal: Patient-Specific Goal (Individualization)  Description: You can add care plan individualizations to a care plan. Examples of Individualization might be:  \"Parent requests to be called daily at 9am for status\", \"I have a hard time hearing out of my right ear\", or \"Do not touch me to wake me up as it startles  me\".  Outcome: Progressing  Goal: Adheres to Safety Considerations for Self and Others  Outcome: Progressing  Intervention: Develop and Maintain Individualized Safety Plan  Recent Flowsheet Documentation  Taken 1/17/2025 1000 by Vera Graham RN  Safety Measures:   1:1 observation maintained   environmental rounds completed   safety rounds completed  Intervention: Develop and Maintain Individualized Safety Plan  Recent Flowsheet Documentation  Taken 1/17/2025 1000 by Vera Graham RN  Safety Measures:   1:1 observation maintained   environmental rounds completed   safety rounds completed  Goal: Absence of New-Onset Illness or Injury  Outcome: Progressing  Intervention: Identify and Manage Fall Risk  Recent Flowsheet Documentation  Taken 1/17/2025 1000 by Vera Graham RN  Safety Measures:   1:1 observation maintained   environmental rounds completed   safety rounds completed  Intervention: Prevent VTE (Venous Thromboembolism)  Recent Flowsheet Documentation  Taken 1/17/2025 1000 by Vera Grhaam RN  VTE Prevention/Management: SCDs off (sequential compression devices)  Intervention: Prevent VTE (Venous Thromboembolism)  Recent Flowsheet Documentation  Taken 1/17/2025 1000 by Vera Graham RN  VTE Prevention/Management: SCDs off (sequential compression devices)  Goal: Optimized Coping Skills in Response to Life Stressors  Outcome: " Progressing  Intervention: Promote Effective Coping Strategies  Recent Flowsheet Documentation  Taken 1/17/2025 1000 by Vera Graham RN  Supportive Measures: active listening utilized  Intervention: Promote Effective Coping Strategies  Recent Flowsheet Documentation  Taken 1/17/2025 1000 by Vera Graham RN  Supportive Measures: active listening utilized  Goal: Develops/Participates in Therapeutic Collins to Support Successful Transition  Outcome: Progressing  Intervention: Foster Therapeutic Collins  Recent Flowsheet Documentation  Taken 1/17/2025 1000 by Vera Graham RN  Trust Relationship/Rapport:   care explained   choices provided   emotional support provided   empathic listening provided   questions answered   questions encouraged   reassurance provided   thoughts/feelings acknowledged  Intervention: Foster Therapeutic Collins  Recent Flowsheet Documentation  Taken 1/17/2025 1000 by Vera Graham RN  Trust Relationship/Rapport:   care explained   choices provided   emotional support provided   empathic listening provided   questions answered   questions encouraged   reassurance provided   thoughts/feelings acknowledged       Goal Outcome Evaluation:    Plan of Care Reviewed With: patient     Patient's affect was flat and blunted. His mood was calm. He denied pain or physical discomfort. He denied all psych MH symptoms and contracted for safety. Patient was observed sitting in his favorite chair in the milieu. He was observed sleeping on and off while sitting in his chair. He was also observed socializing with selected peers. Patient C/O his pants falling off his waist. A couple of fall precaution wrist bands were used to hold his pants in place. Patient was eating and drinking adequately. There was no behavioral escalations or safety concerns noted this shift. Will continue the current plan of care and notify the provider of any concerns.

## 2025-01-17 NOTE — PLAN OF CARE
Problem: Sleep Disturbance  Goal: Adequate Sleep/Rest  Outcome: Not Progressing    Patient denies pain and discomfort. Slept on and off this shift. Patient had 3.75 total hours of sleep. Patient was loud in the hallway but redirectable. Covid-19 specimen sent to Lab with negative result noted. Patient confused, disorganized, wants to buy some coffee outside. Patient talking while sleeping.  Patient on status individual observation with 1 staffing, and space restriction for safety, to prevent/manage severe intrusiveness, assaultive behavior, to mitigate safety risks.   Will continue with current plan of care.

## 2025-01-17 NOTE — PLAN OF CARE
Problem: Psychotic Signs/Symptoms  Goal: Improved Sleep (Psychotic Signs/Symptoms)  Outcome: Progressing     Problem: Depression  Goal: Improved Mood  Outcome: Progressing     Problem: Manic or Hypomanic Signs/Symptoms  Goal: Improved Impulse Control (Manic/Hypomanic Signs/Symptoms)  Outcome: Progressing   Goal Outcome Evaluation:     Pt was visible in  the lounge watching TV with select peers. Presented with a flat and blunted affect but pleasant on approach. Mood  is calm. Pt denied any physical pain or discomfort. Pt is eating and drinking well. Pt denied all mental health and psych symptoms upon assessment. Pt was compliant with medication and contracted for safety. No behavioral escalation noted this shift. No safety concerns.

## 2025-01-17 NOTE — PLAN OF CARE
BEH IP Unit Acuity Rating Score (UARS)  Patient is given one point for every criteria they meet.    CRITERIA SCORING   On a 72 hour hold, court hold, committed, stay of commitment, or revocation. 1    Patient LOS on BEH unit exceeds 20 days. 1  LOS: 97   Patient under guardianship, 55+, otherwise medically complex, or under age 11. 1   Suicide ideation without relief of precipitating factors. 0   Current plan for suicide. 0   Current plan for homicide. 0   Imminent risk or actual attempt to seriously harm another without relief of factors precipitating the attempt. 1   Severe dysfunction in daily living (ex: complete neglect for self care, extreme disruption in vegetative function, extreme deterioration in social interactions). 1   Recent (last 7 days) or current physical aggression in the ED or on unit. 0   Restraints or seclusion episode in past 72 hours. 0   Recent (last 7 days) or current verbal aggression, agitation, yelling, etc., while in the ED or unit. 0   Active psychosis. 1   Need for constant or near constant redirection (from leaving, from others, etc).  0   Intrusive or disruptive behaviors. 1   Patient requires 3 or more hours of individualized nursing care per 8-hour shift (i.e. for ADLs, meds, therapeutic interventions). 1   TOTAL 8

## 2025-01-17 NOTE — PLAN OF CARE
Team Note Due:  Monday    Assessment/Intervention/Current Symtoms and Care Coordination:  Chart review and met with team, discussed pt progress, symptomology, and response to treatment.  Discussed the discharge plan and any potential impediments to discharge. Clifford Aaron is currently emergency guardian/conservator until 2025.    No updates. Waiting for update from [facility] [place] and family on status of guardianship set to  next week.    Discharge Plan or Goal:  Memory care facility     Barriers to Discharge:  Patient requires further psychiatric stabilization due to current symptomology, medication management with changes subject to provider, coordination with outside supports, and aftercare planning. Pt is under civil commitment.     Referral Status:    Memory Care facilities currently in process:  [facility]. Tour completed .  [facility]. Tour completed .  [facility]. Tour completed . Per Clifford on , she would like to move forward with a referral. Records sent . RN assessment pending as of .  Vera (Exec Director): [phone]  [facility] [place]. Tour scheduled 25. Have immediate openings and will accept EW.    Memory Care facilities pending family review:  [facility].  [facility].  [facility].  [facility].  [facility].    Memory Care facilities declined:  [facility] (private pay only, no EW).  [facility] (private pay only, no EW).  [facility] (no openings).  [facility]. Tour completed . Records sent 24. Declined 24 (don't feel they are an appropriate facility for pt's needs).  Lorraine (): [email or web address]; [phone]  Isatu (director of nursing):  [email or web address]  [facility].  Denied 12/26 (concerned about dementia with psychosis, history of hallucinations, high elopement risk, still on 1:1).  Nikki Noel: [email or web address]; Office [phone], Fax [phone]   [facility]. Not accepting EW as of 1/7/25.    Legal Status:  MI Commitment with [facility]  File Number: [id number]  Start and expiration date of commitment: 10/24/24 - 04/24/25    [facility] meds: Haldol, Clozaril, Risperdal, Invega, Zyprexa, Seroquel, Abilify    PPS/CM:  Shelby Chowdary: [phone]  [email or web address]    Contacts:  Maria C Aaron (Daughter): [phone]   Clifford Agnieszka (ex-wife): [phone]     No Del Angel (guardianship/conservatorship ): [phone]  [email or web address]    Derrell Duff ([employer] ): [phone]  [email or web address]     Upcoming Meetings and Dates/Important Information and next steps:  Follow up with family regarding list of Memory Care facilities  Discharge planning when appropriate  Pt does not qualify for MA per financial counselor on 11/8/2024. Pt will likely privately pay for Memory Care until he qualifies for MA/waivers in the future.    Provisional Discharge and Change of Status needed at discharge

## 2025-01-17 NOTE — PROGRESS NOTES
"  ----------------------------------------------------------------------------------------------------------  [facility]  Psychiatry Progress Note  Hospital Day #97     Interim History:     The patient's care was discussed with the treatment team and chart notes were reviewed.    Identifier: Estevan Aaron is a 64 year old male with previous psychiatric diagnoses of  generalized anxiety disorder, Neurocognitive disorder, admitted from the ED 10/12/2024 due to concern for HI and psychosis in the context of medical issues (hyperthyroidism, hypercalcemia) psychosocial stressors including family dynamics with recent possible divorce.    Sleep: 3.75 hours (01/17/25 0600)  Scheduled medications: Took all scheduled medications as prescribed  Psychiatric PRN medications: None    Staff Report:   No acute events overnight. In summary he has continued to be confused and selectively interactive with peers. He slept 3.75 hours. See staff notes for details.      Subjective:     Patient Interview:  Santos was interviewed in the [facility]. Reports being \"Fine\". Notes that last night went fine. Did not watch any TV this morning. No concerns or questions this morning.     ROS:  See above     Objective:     Vitals:  BP (!) 147/71   Pulse 69   Temp 97.5  F (36.4  C) (Temporal)   Resp 18   Wt 113.7 kg (250 lb 9.6 oz)   SpO2 95%   BMI 40.45 kg/m      Allergies:  No Known Allergies    Current Medications:  Scheduled:  Current Facility-Administered Medications   Medication Dose Route Frequency Provider Last Rate Last Admin    acetaminophen (TYLENOL) tablet 650 mg  650 mg Oral Q4H PRN Nikki Caceres MD   650 mg at 01/09/25 0228    alum & mag hydroxide-simethicone (MAALOX) suspension 30 mL  30 mL Oral Q4H PRN Nikki Caceres MD   30 mL at 10/17/24 0837    amLODIPine (NORVASC) tablet 10 mg  10 mg Oral Daily Presley Barrera MD   10 mg at 01/16/25 0813    atorvastatin (LIPITOR) tablet 40 mg "  40 mg Oral Daily Nikki Caceres MD   40 mg at 01/16/25 0813    cholecalciferol (VITAMIN D3) capsule 1,250 mcg  1,250 mcg Oral Q7 Days Evelia Grimm DO   1,250 mcg at 01/14/25 1053    cinacalcet (SENSIPAR) tablet 30 mg  30 mg Oral Daily Presley Barrera MD   30 mg at 01/16/25 0813    cloNIDine (CATAPRES) tablet 0.1 mg  0.1 mg Oral BID Presley Barrera MD   0.1 mg at 01/16/25 2037    diclofenac (VOLTAREN) 1 % topical gel 2 g  2 g Topical 4x Daily PRN Rocío Lopez MD   2 g at 12/22/24 2032    furosemide (LASIX) tablet 40 mg  40 mg Oral Daily Presley Barrera MD   40 mg at 01/16/25 0813    gabapentin (NEURONTIN) capsule 100 mg  100 mg Oral Q6H PRN Nikki Caceres MD   100 mg at 12/24/24 0046    hydrocortisone (CORTAID) 0.5 % cream   Topical Daily PRN Savi Chamorro MD        Lidocaine (LIDOCARE) 4 % Patch 1 patch  1 patch Transdermal Q24H PRN Rocío Lopez MD   1 patch at 12/22/24 2023    lisinopril (ZESTRIL) tablet 40 mg  40 mg Oral Daily Presley Barrera MD   40 mg at 01/16/25 0813    melatonin tablet 3 mg  3 mg Oral At Bedtime Presley Barrera MD   3 mg at 01/16/25 2037    metoprolol succinate ER (TOPROL XL) 24 hr tablet 50 mg  50 mg Oral Daily Presley Barrera MD   50 mg at 01/16/25 0813    nicotine (NICODERM CQ) 14 MG/24HR 24 hr patch 1 patch  1 patch Transdermal Daily Evelia Grimm DO   1 patch at 01/16/25 0822    nicotine (NICORETTE) gum 2 mg  2 mg Buccal Q1H PRN González Garcia MD   2 mg at 10/24/24 1254    OLANZapine zydis (zyPREXA) ODT tab 5 mg  5 mg Oral At Bedtime González Garcia MD   5 mg at 01/16/25 2037    Or    OLANZapine (zyPREXA) injection 10 mg  10 mg Intramuscular At Bedtime González Garcia MD   10 mg at 01/12/25 2211    OLANZapine (zyPREXA) tablet 5 mg  5 mg Oral TID PRN Evelia Grimm DO   5 mg at 01/05/25 1645    Or    OLANZapine (zyPREXA) injection 5 mg  5 mg Intramuscular TID PRN Evelia Grimm DO        polyethylene glycol (MIRALAX) Packet  17 g  17 g Oral Daily PRN Nikki Caceres MD        senna-docusate (SENOKOT-S/PERICOLACE) 8.6-50 MG per tablet 1 tablet  1 tablet Oral Daily Evelia Grimm DO   1 tablet at 01/16/25 0813    traZODone (DESYREL) half-tab 25 mg  25 mg Oral At Bedtime Rocío Lopez MD   25 mg at 01/16/25 2037    traZODone (DESYREL) half-tab 25 mg  25 mg Oral At Bedtime PRN Evelia Grimm DO   25 mg at 12/24/24 0046       PRN:  Current Facility-Administered Medications   Medication Dose Route Frequency Provider Last Rate Last Admin    acetaminophen (TYLENOL) tablet 650 mg  650 mg Oral Q4H PRN Nikki Caceres MD   650 mg at 01/09/25 0228    alum & mag hydroxide-simethicone (MAALOX) suspension 30 mL  30 mL Oral Q4H PRN Nikki Caceres MD   30 mL at 10/17/24 0837    amLODIPine (NORVASC) tablet 10 mg  10 mg Oral Daily Presley Barrera MD   10 mg at 01/16/25 0813    atorvastatin (LIPITOR) tablet 40 mg  40 mg Oral Daily Nikki Caceres MD   40 mg at 01/16/25 0813    cholecalciferol (VITAMIN D3) capsule 1,250 mcg  1,250 mcg Oral Q7 Days Evelia Grimm DO   1,250 mcg at 01/14/25 1053    cinacalcet (SENSIPAR) tablet 30 mg  30 mg Oral Daily Presley Barrera MD   30 mg at 01/16/25 0813    cloNIDine (CATAPRES) tablet 0.1 mg  0.1 mg Oral BID Presley Barrera MD   0.1 mg at 01/16/25 2037    diclofenac (VOLTAREN) 1 % topical gel 2 g  2 g Topical 4x Daily PRN Rocío Lopez MD   2 g at 12/22/24 2032    furosemide (LASIX) tablet 40 mg  40 mg Oral Daily Presley Barrera MD   40 mg at 01/16/25 0813    gabapentin (NEURONTIN) capsule 100 mg  100 mg Oral Q6H PRN Nikki Caceres MD   100 mg at 12/24/24 0046    hydrocortisone (CORTAID) 0.5 % cream   Topical Daily PRN Savi Chamorro MD        Lidocaine (LIDOCARE) 4 % Patch 1 patch  1 patch Transdermal Q24H PRN Rocío Lopez MD   1 patch at 12/22/24 2023    lisinopril (ZESTRIL) tablet 40 mg  40 mg Oral Daily Presley Barrera MD   40 mg  at 01/16/25 0813    melatonin tablet 3 mg  3 mg Oral At Bedtime Presley Barrera MD   3 mg at 01/16/25 2037    metoprolol succinate ER (TOPROL XL) 24 hr tablet 50 mg  50 mg Oral Daily Presley Barrera MD   50 mg at 01/16/25 0813    nicotine (NICODERM CQ) 14 MG/24HR 24 hr patch 1 patch  1 patch Transdermal Daily Evelia Grimm DO   1 patch at 01/16/25 0822    nicotine (NICORETTE) gum 2 mg  2 mg Buccal Q1H PRN González Garcia MD   2 mg at 10/24/24 1254    OLANZapine zydis (zyPREXA) ODT tab 5 mg  5 mg Oral At Bedtime González Garcia MD   5 mg at 01/16/25 2037    Or    OLANZapine (zyPREXA) injection 10 mg  10 mg Intramuscular At Bedtime González Garcia MD   10 mg at 01/12/25 2211    OLANZapine (zyPREXA) tablet 5 mg  5 mg Oral TID PRN Evelia Grimm DO   5 mg at 01/05/25 1645    Or    OLANZapine (zyPREXA) injection 5 mg  5 mg Intramuscular TID PRN Evelia Grimm DO        polyethylene glycol (MIRALAX) Packet 17 g  17 g Oral Daily PRN Nikki Caceres MD        senna-docusate (SENOKOT-S/PERICOLACE) 8.6-50 MG per tablet 1 tablet  1 tablet Oral Daily Evelia Grimm DO   1 tablet at 01/16/25 0813    traZODone (DESYREL) half-tab 25 mg  25 mg Oral At Bedtime Rocío Lopez MD   25 mg at 01/16/25 2037    traZODone (DESYREL) half-tab 25 mg  25 mg Oral At Bedtime PRN Evelia Grimm DO   25 mg at 12/24/24 0046     Labs and Imaging:  Data this admission:  - CBC unremarkable  - CMP unremarkable  - TSH normal  - UDS negative  - Vit D low  - Hgb A1c 5.9 (10/13/24)  - Lipids unremarkable  - Vit B12 normal  - Folate normal  - Urinalysis unremarkable  - EKG normal sinus rhythm, QTc 390 ms  - Head CT showed no acute changes  - HIV non reactive  - Treponema antibody non reactive  - ESR wnl   - Ceruloplasmin wnl   - BOOGIE negative  - Lyme negative      Parathyroid hormone:   85 (10/13)     Calcium:  11.4 (10/14) -> 10.3 (10/16) -> 9.8 (10/19) -> 10.6 (10/21) -> 10.3 (10/23)     Albumin:  4.3 (10/14) -->  4.3 (10/16) -> 4.1  "(10/17) -> 4.2 (10/19) -> 4.5 (10/21) -> 4.3 (10/23)      Mental Status Exam:   Oriented to:  Person/Self  General:  Awake and Alert  Appearance:  appears stated age, Grooming is adequate, Dressed in own clothing, and overweight  Behavior/Attitude:  Calm and Disengaged  Eye Contact: Avoids or Evasive  Psychomotor: Normal no catatonia present  Speech:  appropriate volume/tone  Language: Fluent in English with appropriate syntax and vocabulary.  Mood:  \"Fine\"  Affect:  appropriate and congruent with mood  Thought Process:  impoverished and confused  Thought Content:   No SI/HI/AH/VH; No apparent delusions  Associations:  loose  Insight:  impaired due to neurocognitive disorder   Judgment:  impaired due to neurocognitive disorder  Impulse control: impaired  Attention Span:  inattentive  Concentration:   impaired by neurocognitive disorder  Recent and Remote Memory:   remote memory intact, recent memory impaired  Fund of Knowledge: average  Muscle Strength and Tone: normal  Gait and Station: Normal     Psychiatric Assessment     Estevan Aaron is a 64 year old male with previous psychiatric diagnoses of GEORGINA admitted from the ER on 10/12/2024 due to concern for HI and psychosis in the context of medical issues (hyperthyroidism, hypercalcemia), psychosocial stressors including with recent divorce and selling his home. This is the patient's first psychiatric hospitalization. The MSE on admission was pertinent for a thought process which was perseverative, circumstantial, tangential, disorganized and tangential; with rambling and looseness of associations. Psychological contributions to presentation included lack of insight. Social factors contributing to presentation included isolation, recent divorce, selling his house, and moving from hotel to hotel. Biological contributions to presentation included a history of hyperparathyroidism with chronic hypercalcemia per charts as well as a history of methamphetamine use per " collateral from ex-wife.     Per collateral from ex-wife, Santos has had paranoid ideations since they first met. He has always felt like people are out to get him or trying to rip him off. She says that he has had visual hallucinations (he will point things out that the rest of the family can't see) for a long time, but the family would just go along with him to avoid making him angry. This timeline and presentation could be consistent with diagnosis of paranoid personality disorder. Things began to worsen around the beginning of the COVID pandemic, when Santos became more isolated and the family started to notice cognitive issues such as impaired memory. Other things that could be contributing to his presentation is hyperparathyroidism with hypercalcemia. However, patient's calcium returned to normal limits on 10/16, and patient continues to have disorganized behavior unrelated to calcium elevation, so likely this is not a significant contributing factor to patient's presentation.      Currently, Santos is at times disoriented, but easily re-directable. Presents with some difficulty of word articulation, which contributes to his frustration when interacting with psych team, as he finds it difficult to explain himself. He is able to take care of his ADLs without much assistance and he is mostly independent in the unit. On the other hand, his confused behavior tend to increase in the evenings, seemingly related to  Neurocognitive Disorder diagnosis, and this could evolved to be his new baseline. Either way, Santos does not present as a danger to himself or other so his SIO was discontinued. Santos does not present with any new episodes of physical agitation and is able to attend groups and interact with peers without major altercations. Patient currently is stable with current neuroleptic dose, patient probably wont benefit from any modification in current therapy.     Family care meeting 01/02 with psych team: update on current  availability of placement options and Santos's current presentation. Family aware of Santos's level of functionality with ADLs, besides  disorientation episodes at evenings, that only requires minimal intervention for redirection. Still pending visits to possible memory care placements by family.    Santos appeared more confused throughout the day, different from his usual baseline, per staff report. No changed in vital signs but concern for acute delirium. UA was requested.    Goal of treatment:  Procure a memory care placement.      Psychiatric Plan by Diagnosis     Today's changes:  - None    # Major Neurocognitive Disorder  1. Medications:  - Olanzapine 5 mg at bedtime     3. Additional Plans:  - Patient will be treated in therapeutic milieu with appropriate individual and group therapies as described  - Pending memory facility placement.      # Unspecified anxiety vs Generalized Anxiety Disorder  - Monitor for symptoms.  - Fluoxetine held due to suspicion of ongoing manic symptoms     Psychiatric Hospital Course:      Estevan Aaron was admitted to [facility] on a 72 hour hold.   Medications:  PTA fluoxetine was held due to concern for worsening of zelalem   New medications started at the time of admission include Zyprexa.   Increased olanzapine 10 mg at bedtime was to 10 mg BID (10/14)   Increased olanzapine 10 mg BID to 10 mg during day and 15 mg at bedtime (10/15)  10/21: increased olanzapine pm dose from 15 to 20 mg  10/23: started melatonin 3 mg at 7 pm to help patient with circadian rhythm as he has been staying up throughout the night and sleeping a lot during the day and there is some concern for delirium   10/24: consulted anesthesia for MRI brain   10/28: MRI brain complete, decrease AM olanzapine from 10 to 5 mg  10/29: Morning olanzapine decreased to 2.5 mg, evening olanzapine decreased to 15 mg   11/1: Decreased evening olanzapine to 10 mg   11/4: Decreased evening olanzapine to 7.5 mg   11/5: Decreased  evening olanzapine to 5 mg   11/11: Moved morning dose of olanzapine to the afternoon as patient appears more agitated in the afternoons/evenings  11/21: Started memantine 5 mg daily   11/26: Discontinued olanzapine 2.5 mg during the afternoon  12/2:   Discontinued memantine 5 mg, daily    The risks, benefits, alternatives, and side effects were discussed and understood by the patient and other caregivers.     Medical Assessment and Plan     Medical diagnoses to be addressed this admission:    # Hip Pain  - New right hip pain, and mild pain in multiple joints. Moderate response to topical antiinflammatory gel and lidocaine patch.   - Medicine consulted for recommendations 12/20    # CHF  # Hypertension  - Continue PTA medications  Furosemide 40 mg daily  Lisinopril 40 mg daily  Metoprolol 50 mg daily  Amlodipine 10 mg daily  Clonidine 0.1 mg BID  - Pitting edema in lower extremities, not painful 3+: Medicine consulted for recommendations 12/20  - Weight gain of about 8 pounds in about 2 months currently 249lb as of 01/07/25    # Hyperlipidemia  - Continue PTA Atorvastatin 40 mg     # Primary Hyperparathyroidism  # Hypercalcemia, hypophosphoremia   Increased Ca level to 10.9 and decreased Ph level to 2.3 in the ED   - Consulted endocrinology, who started cinacalcet 30 mg BID on 10/13  - 10/16 endocrinology recommended continuing to trend calcium and albumin to make sure patient does not become hypocalcemic and recommended holding cinacalcet   - 10/17, calcium and albumin wnl, endo recommended cinacalcet 30 mg once daily   - 10/21, per endo continue cincaclcet and recheck calcium and albumin on October 24  - 10/23: per endo, patient needs to follow up outpatient for further management of hyperparathyroidism     # Rhinorrhea  1/15 currently multiple COVID infections on unit. No other symptoms of COVID noted. COVID PCR - on 1/13.    Medical course: Patient was physically examined by the ED prior to being transferred to  the unit and was found to be medically stable and appropriate for admission.      Consults: Psychiatry, Endocrinology (follow-up of hyperthyroidism / hypercalcemia and hypertension), neurology     Checklist     Legal Status: Committed   [place] meds: Haldol, Clozaril, Risperdal, Invega, Zyprexa, Seroquel, Abilify    Safety Assessment:   Behavioral Orders   Procedures    Code 1 - Restrict to Unit    Routine Programming     As clinically indicated    Status 15     Every 15 minutes.    Status Individual Observation     Patient SIO status reviewed with team/RN.  Please also refer to RN/team documentation for add'l detail.    -SIO staff to monitor following which have contributed to patient being on SIO:  Patient intrusiveness to other patients  -Possible interventions SIO staff could use to support patient's treatment progress:  Redirection  -When following observed, team will review discontinuation of SIO:  Improvement in intrusive behavior.     Order Specific Question:   CONTINUOUS 24 hours / day     Answer:   Other     Order Specific Question:   Specify distance     Answer:   10 feet     Order Specific Question:   Indications for SIO     Answer:   Severe intrusiveness       Risk Assessment:  Risk for harm is low  Risk factors: impulsive and past behaviors  Protective factors: family      SIO: No    Disposition: Pending assessment for memory care facility. Assessment planned for Friday 1/17 or Monday 1/20.     Attestations   Estevan VANNA Aaron was seen and discussed with attending psychiatrist Dr. Pete Smith.     Pb Rust, MS3  [facility]      I was present with the medical student who participated in the service and in the documentation of the note. I have verified the history and personally performed the exam and medical decision making. I agree with the assessment and plan of care as documented in the note. Pete Smith M.D., Ph.D.

## 2025-01-18 PROCEDURE — 250N000013 HC RX MED GY IP 250 OP 250 PS 637

## 2025-01-18 PROCEDURE — 124N000002 HC R&B MH UMMC

## 2025-01-18 RX ADMIN — AMLODIPINE BESYLATE 10 MG: 5 TABLET ORAL at 08:50

## 2025-01-18 RX ADMIN — Medication 1 PATCH: at 08:58

## 2025-01-18 RX ADMIN — METOPROLOL SUCCINATE 50 MG: 50 TABLET, EXTENDED RELEASE ORAL at 08:50

## 2025-01-18 RX ADMIN — SENNOSIDES AND DOCUSATE SODIUM 1 TABLET: 50; 8.6 TABLET ORAL at 08:50

## 2025-01-18 RX ADMIN — LISINOPRIL 40 MG: 10 TABLET ORAL at 08:50

## 2025-01-18 RX ADMIN — CINACALCET 30 MG: 30 TABLET ORAL at 08:50

## 2025-01-18 RX ADMIN — OLANZAPINE 5 MG: 5 TABLET, ORALLY DISINTEGRATING ORAL at 20:26

## 2025-01-18 RX ADMIN — CLONIDINE HYDROCHLORIDE 0.1 MG: 0.1 TABLET ORAL at 20:27

## 2025-01-18 RX ADMIN — CLONIDINE HYDROCHLORIDE 0.1 MG: 0.1 TABLET ORAL at 08:50

## 2025-01-18 RX ADMIN — Medication 25 MG: at 20:26

## 2025-01-18 RX ADMIN — MELATONIN TAB 3 MG 3 MG: 3 TAB at 20:25

## 2025-01-18 RX ADMIN — ATORVASTATIN CALCIUM 40 MG: 20 TABLET, FILM COATED ORAL at 08:50

## 2025-01-18 RX ADMIN — FUROSEMIDE 40 MG: 40 TABLET ORAL at 08:50

## 2025-01-18 ASSESSMENT — ACTIVITIES OF DAILY LIVING (ADL)
HYGIENE/GROOMING: INDEPENDENT
ADLS_ACUITY_SCORE: 66
ORAL_HYGIENE: INDEPENDENT
LAUNDRY: WITH SUPERVISION
ADLS_ACUITY_SCORE: 66
LAUNDRY: WITH SUPERVISION
ADLS_ACUITY_SCORE: 66
DRESS: STREET CLOTHES
ADLS_ACUITY_SCORE: 66
DRESS: STREET CLOTHES;INDEPENDENT
ADLS_ACUITY_SCORE: 66
ORAL_HYGIENE: INDEPENDENT
HYGIENE/GROOMING: INDEPENDENT
ADLS_ACUITY_SCORE: 66

## 2025-01-18 NOTE — PLAN OF CARE
Problem: Sleep Disturbance  Goal: Adequate Sleep/Rest  Outcome: Progressing   Goal Outcome Evaluation:    Patient appears to have slept a total of 6.25 hours.     At one point had to be reminded to put his pants on before leaving his room and was easily redirectable.     Safety/environment checks conducted every 15 minutes with no further concerns noted. No complaints of pain/discomfort.

## 2025-01-18 NOTE — PLAN OF CARE
Goal Outcome Evaluation:    Plan of Care Reviewed With: patient      Patient spent most of his time in the milieu. He was either watching TV or Socializing with peers. His affect was flat and blunted. His mood was calm. He was medication compliant. He denied pain. He denied all psych and MH symptoms and contracted for safety. His hygiene was appropriate. He was eating and hydrating well. There was no behavior escalations or safety concerns this shift.

## 2025-01-18 NOTE — PLAN OF CARE
Problem: Adult Behavioral Health Plan of Care  Goal: Adheres to Safety Considerations for Self and Others  Outcome: Progressing     Problem: Psychotic Signs/Symptoms  Goal: Improved Behavioral Control (Psychotic Signs/Symptoms)  Outcome: Progressing     Problem: Manic or Hypomanic Signs/Symptoms  Goal: Improved Impulse Control (Manic/Hypomanic Signs/Symptoms)  Outcome: Progressing   Goal Outcome Evaluation:  Pt was visible in the milieu watching TV  with select peers dosing on and off. Affect blunted and flat. Mood is calm. Pt denied any physical pain and discomfort. Denies all mental health and psych symptoms upon assessments. No safety concerns.  Pt was cooperative and compliant with medications. No behavioral escalation noted this shift.

## 2025-01-19 PROCEDURE — 124N000002 HC R&B MH UMMC

## 2025-01-19 PROCEDURE — 250N000013 HC RX MED GY IP 250 OP 250 PS 637

## 2025-01-19 RX ADMIN — Medication 25 MG: at 20:18

## 2025-01-19 RX ADMIN — CINACALCET 30 MG: 30 TABLET ORAL at 08:05

## 2025-01-19 RX ADMIN — CLONIDINE HYDROCHLORIDE 0.1 MG: 0.1 TABLET ORAL at 08:06

## 2025-01-19 RX ADMIN — LISINOPRIL 40 MG: 10 TABLET ORAL at 08:05

## 2025-01-19 RX ADMIN — Medication 1 PATCH: at 08:17

## 2025-01-19 RX ADMIN — CLONIDINE HYDROCHLORIDE 0.1 MG: 0.1 TABLET ORAL at 20:17

## 2025-01-19 RX ADMIN — MELATONIN TAB 3 MG 3 MG: 3 TAB at 20:17

## 2025-01-19 RX ADMIN — OLANZAPINE 5 MG: 5 TABLET, ORALLY DISINTEGRATING ORAL at 20:18

## 2025-01-19 RX ADMIN — METOPROLOL SUCCINATE 50 MG: 50 TABLET, EXTENDED RELEASE ORAL at 08:05

## 2025-01-19 RX ADMIN — SENNOSIDES AND DOCUSATE SODIUM 1 TABLET: 50; 8.6 TABLET ORAL at 08:06

## 2025-01-19 RX ADMIN — AMLODIPINE BESYLATE 10 MG: 5 TABLET ORAL at 08:05

## 2025-01-19 RX ADMIN — FUROSEMIDE 40 MG: 40 TABLET ORAL at 08:06

## 2025-01-19 RX ADMIN — ATORVASTATIN CALCIUM 40 MG: 20 TABLET, FILM COATED ORAL at 08:05

## 2025-01-19 ASSESSMENT — ACTIVITIES OF DAILY LIVING (ADL)
DRESS: STREET CLOTHES;INDEPENDENT
ADLS_ACUITY_SCORE: 66
HYGIENE/GROOMING: INDEPENDENT
LAUNDRY: WITH SUPERVISION
LAUNDRY: WITH SUPERVISION
ADLS_ACUITY_SCORE: 66
ORAL_HYGIENE: INDEPENDENT
ORAL_HYGIENE: INDEPENDENT
ADLS_ACUITY_SCORE: 66
DRESS: STREET CLOTHES;SCRUBS (BEHAVIORAL HEALTH)
ADLS_ACUITY_SCORE: 66
HYGIENE/GROOMING: INDEPENDENT
ADLS_ACUITY_SCORE: 66

## 2025-01-19 NOTE — PLAN OF CARE
Problem: Sleep Disturbance  Goal: Adequate Sleep/Rest  Outcome: Progressing   Goal Outcome Evaluation:    Patient appears to have slept a total of 5.25 hours.     Compression socks ordered for pt as requested. Safety/environment checks conducted every 15 minutes with no concerns noted. No complaints of pain/discomfort.

## 2025-01-19 NOTE — PLAN OF CARE
Problem: Manic or Hypomanic Signs/Symptoms  Goal: Improved Impulse Control (Manic/Hypomanic Signs/Symptoms)  Outcome: Progressing  Intervention: Promote Behavior and Impulse Control  Recent Flowsheet Documentation  Taken 1/19/2025 1700 by Tamie Fraser RN  De-Escalation Techniques:   verbally redirected   1:1 observation initiated  Diversional Activity: television     Problem: Psychotic Signs/Symptoms  Goal: Improved Behavioral Control (Psychotic Signs/Symptoms)  Outcome: Progressing  Intervention: Manage Behavior  Recent Flowsheet Documentation  Taken 1/19/2025 1700 by Tamie Fraser RN  De-Escalation Techniques:   verbally redirected   1:1 observation initiated     Problem: Manic or Hypomanic Signs/Symptoms  Goal: Improved Impulse Control (Manic/Hypomanic Signs/Symptoms)  Outcome: Progressing  Intervention: Promote Behavior and Impulse Control  Recent Flowsheet Documentation  Taken 1/19/2025 1700 by Tamie Fraser RN  De-Escalation Techniques:   verbally redirected   1:1 observation initiated  Diversional Activity: television   Goal Outcome Evaluation:    Pt was visible in the lounge watching TV with select peers. Affect labile. Mood is calm. Pt was observed pacing the mamrolejo with peers. Denies pain, all mental health and psych symptoms upon assessment. Pt is eating and drinking well. Hygiene is poor. Pt was observed sleeping on and off in the lounge. Pt refused to wear his compression socks this shift. Pt was compliant with medication and contracted for safety. No behavioral escalation noted this shift..

## 2025-01-19 NOTE — PLAN OF CARE
"  Problem: Adult Behavioral Health Plan of Care  Goal: Plan of Care Review  Outcome: Progressing  Flowsheets  Taken 1/19/2025 1054  Plan of Care Reviewed With: patient  Taken 1/19/2025 0800  Patient Agreement with Plan of Care: agrees  Goal: Patient-Specific Goal (Individualization)  Description: You can add care plan individualizations to a care plan. Examples of Individualization might be:  \"Parent requests to be called daily at 9am for status\", \"I have a hard time hearing out of my right ear\", or \"Do not touch me to wake me up as it startles  me\".  Outcome: Progressing  Goal: Adheres to Safety Considerations for Self and Others  Outcome: Progressing  Intervention: Develop and Maintain Individualized Safety Plan  Recent Flowsheet Documentation  Taken 1/19/2025 0800 by Vera Graham RN  Safety Measures:   1:1 observation maintained   environmental rounds completed   safety rounds completed  Intervention: Develop and Maintain Individualized Safety Plan  Recent Flowsheet Documentation  Taken 1/19/2025 0800 by Vera Graham RN  Safety Measures:   1:1 observation maintained   environmental rounds completed   safety rounds completed  Goal: Absence of New-Onset Illness or Injury  Outcome: Progressing  Intervention: Identify and Manage Fall Risk  Recent Flowsheet Documentation  Taken 1/19/2025 0800 by Vera Graham RN  Safety Measures:   1:1 observation maintained   environmental rounds completed   safety rounds completed  Intervention: Prevent VTE (Venous Thromboembolism)  Recent Flowsheet Documentation  Taken 1/19/2025 0800 by Vera Graham RN  VTE Prevention/Management: SCDs off (sequential compression devices)  Intervention: Prevent VTE (Venous Thromboembolism)  Recent Flowsheet Documentation  Taken 1/19/2025 0800 by Vera Graham RN  VTE Prevention/Management: SCDs off (sequential compression devices)  Goal: Optimized Coping Skills in Response to Life Stressors  Outcome: " Progressing  Intervention: Promote Effective Coping Strategies  Recent Flowsheet Documentation  Taken 1/19/2025 0800 by Vera Graham RN  Supportive Measures: active listening utilized  Intervention: Promote Effective Coping Strategies  Recent Flowsheet Documentation  Taken 1/19/2025 0800 by Vera Graham RN  Supportive Measures: active listening utilized  Goal: Develops/Participates in Therapeutic [place] to Support Successful Transition  Outcome: Progressing  Intervention: Foster Therapeutic [place]  Recent Flowsheet Documentation  Taken 1/19/2025 0800 by Vera Graham RN  Trust Relationship/Rapport:   care explained   choices provided   emotional support provided   empathic listening provided   questions answered   questions encouraged   reassurance provided   thoughts/feelings acknowledged  Intervention: Foster Therapeutic [place]  Recent Flowsheet Documentation  Taken 1/19/2025 0800 by Vera Graham RN  Trust Relationship/Rapport:   care explained   choices provided   emotional support provided   empathic listening provided   questions answered   questions encouraged   reassurance provided   thoughts/feelings acknowledged       Goal Outcome Evaluation:    Plan of Care Reviewed With: patient      Patient spent most of his time in the milieu. He was watching TV or Socializing with peers. His affect was flat and blunted. His mood was calm. He was medication compliant. He denied pain. He denied all psych and MH symptoms and contracted for safety. His hygiene was unkempt. He was eating and hydrating well. There was no behavior escalations or safety concerns noted this shift.  Will continue to support and notify the provider of any concerns.  BP (!) 157/82 (BP Location: Left arm)   Pulse 58   Temp 97.3  F (36.3  C) (Temporal)   Resp 18   Wt 114.6 kg (252 lb 9.6 oz)   SpO2 96%   BMI 40.77 kg/m

## 2025-01-20 PROCEDURE — 250N000013 HC RX MED GY IP 250 OP 250 PS 637

## 2025-01-20 PROCEDURE — 124N000002 HC R&B MH UMMC

## 2025-01-20 RX ADMIN — ATORVASTATIN CALCIUM 40 MG: 20 TABLET, FILM COATED ORAL at 08:23

## 2025-01-20 RX ADMIN — LISINOPRIL 40 MG: 10 TABLET ORAL at 08:23

## 2025-01-20 RX ADMIN — OLANZAPINE 5 MG: 5 TABLET, ORALLY DISINTEGRATING ORAL at 21:21

## 2025-01-20 RX ADMIN — MELATONIN TAB 3 MG 3 MG: 3 TAB at 21:20

## 2025-01-20 RX ADMIN — CLONIDINE HYDROCHLORIDE 0.1 MG: 0.1 TABLET ORAL at 21:20

## 2025-01-20 RX ADMIN — CINACALCET 30 MG: 30 TABLET ORAL at 08:23

## 2025-01-20 RX ADMIN — CLONIDINE HYDROCHLORIDE 0.1 MG: 0.1 TABLET ORAL at 08:23

## 2025-01-20 RX ADMIN — METOPROLOL SUCCINATE 50 MG: 50 TABLET, EXTENDED RELEASE ORAL at 08:23

## 2025-01-20 RX ADMIN — FUROSEMIDE 40 MG: 40 TABLET ORAL at 08:23

## 2025-01-20 RX ADMIN — SENNOSIDES AND DOCUSATE SODIUM 1 TABLET: 50; 8.6 TABLET ORAL at 08:23

## 2025-01-20 RX ADMIN — AMLODIPINE BESYLATE 10 MG: 5 TABLET ORAL at 08:22

## 2025-01-20 RX ADMIN — Medication 1 PATCH: at 08:26

## 2025-01-20 RX ADMIN — Medication 25 MG: at 21:20

## 2025-01-20 ASSESSMENT — ACTIVITIES OF DAILY LIVING (ADL)
ADLS_ACUITY_SCORE: 66
ORAL_HYGIENE: INDEPENDENT
ADLS_ACUITY_SCORE: 66
HYGIENE/GROOMING: INDEPENDENT
ADLS_ACUITY_SCORE: 66
ADLS_ACUITY_SCORE: 66
DRESS: INDEPENDENT
ADLS_ACUITY_SCORE: 66
ADLS_ACUITY_SCORE: 66
HYGIENE/GROOMING: INDEPENDENT
ADLS_ACUITY_SCORE: 66
ORAL_HYGIENE: INDEPENDENT
ADLS_ACUITY_SCORE: 66
ADLS_ACUITY_SCORE: 66
DRESS: INDEPENDENT
ADLS_ACUITY_SCORE: 66
ADLS_ACUITY_SCORE: 66

## 2025-01-20 NOTE — PLAN OF CARE
BEH IP Unit Acuity Rating Score (UARS)  Patient is given one point for every criteria they meet.    CRITERIA SCORING   On a 72 hour hold, court hold, committed, stay of commitment, or revocation. 1    Patient LOS on BEH unit exceeds 20 days. 1  LOS: 100   Patient under guardianship, 55+, otherwise medically complex, or under age 11. 1   Suicide ideation without relief of precipitating factors. 0   Current plan for suicide. 0   Current plan for homicide. 0   Imminent risk or actual attempt to seriously harm another without relief of factors precipitating the attempt. 1   Severe dysfunction in daily living (ex: complete neglect for self care, extreme disruption in vegetative function, extreme deterioration in social interactions). 1   Recent (last 7 days) or current physical aggression in the ED or on unit. 0   Restraints or seclusion episode in past 72 hours. 0   Recent (last 7 days) or current verbal aggression, agitation, yelling, etc., while in the ED or unit. 0   Active psychosis. 1   Need for constant or near constant redirection (from leaving, from others, etc).  0   Intrusive or disruptive behaviors. 1   Patient requires 3 or more hours of individualized nursing care per 8-hour shift (i.e. for ADLs, meds, therapeutic interventions). 1   TOTAL 8

## 2025-01-20 NOTE — PLAN OF CARE
Problem: Sleep Disturbance  Goal: Adequate Sleep/Rest  Outcome: Progressing   Goal Outcome Evaluation:    Patient appears to have slept a total of 5.5 hours.     Compression socks applied in morning with encouragement. Safety/environment checks conducted every 15 minutes with no concerns noted. No complaints of pain/discomfort.

## 2025-01-20 NOTE — PLAN OF CARE
Problem: Adult Behavioral Health Plan of Care  Goal: Adheres to Safety Considerations for Self and Others  Outcome: Progressing  Goal: Optimized Coping Skills in Response to Life Stressors  Outcome: Progressing     Problem: Psychotic Signs/Symptoms  Goal: Improved Behavioral Control (Psychotic Signs/Symptoms)  Outcome: Progressing     Problem: Depression  Goal: Improved Mood  Outcome: Progressing     Problem: Suicidal Behavior  Goal: Suicidal Behavior is Absent or Managed  Outcome: Progressing   Goal Outcome Evaluation:    Pt is intermittently out in the milieu watching television. He is pleasant and cooperative , medication compliant. Pt is fixated on his clothes, asking staff to check on the laundry. When he did not find them there, he was looking at places where he can find them. Finally, he was observed arranging his clothes in his room. Pt cut his fingernails this shift. Encouraged him to take shower and he declined. Pt spent much of his time watching TV/news. He sleeps every now and then on his chair at the [facility] area. No behavioral concerns this shift.

## 2025-01-20 NOTE — PLAN OF CARE
"  Problem: Adult Behavioral Health Plan of Care  Goal: Plan of Care Review  Outcome: Progressing  Flowsheets (Taken 1/20/2025 1600)  Patient Agreement with Plan of Care: agrees     Problem: Psychotic Signs/Symptoms  Goal: Improved Behavioral Control (Psychotic Signs/Symptoms)  Outcome: Progressing   Goal Outcome Evaluation:    Plan of Care Reviewed With: patient      Pt was intermittently visible in the milieu, alert and able to make needs known, affect remains flat, He paced the halls. At the beginning of the shift pt met with LESLY Quiroga from [facility] with the  and the pt to discuss about discharge plan, pt was cooperative.   Pt has been watching television with peers, he has been intrusive and calling names to peers and staff and cursing them but was able to be redirectable.   At 2050 the writer took the medication to the pt on the lounge, pt became so agitated, he started cursing and told the writer to go away, pt later was re-approached but refused. Two peers encouraged the pt, and after 30 minutes pt called the writer \"now I can take my medication\". Pt took all his meds, adequate food/fluid intake, hygiene unkempt.       "

## 2025-01-20 NOTE — PLAN OF CARE
"Team Note Due:  Monday    Assessment/Intervention/Current Symtoms and Care Coordination:  Chart review and met with team, discussed pt progress, symptomology, and response to treatment.  Discussed the discharge plan and any potential impediments to discharge. Clifford Aaron is currently emergency guardian/conservator until 01/20/2025.    Clifford requested records be sent to [facility]. She is waiting to hear back from [facility] to see if they have openings for a referral as well. She is working with another  on establishing permanent guardianship.    Records sent to Basilio at [facility].    Received call from Kimber GRACE ([phone]) with [employer] wanting to come out for nursing assessment today. Briefly discussed pt's history and current symptoms. She plans to be here around 3pm. Updated family.    Met with pt in his room to discuss Kimber coming to visit this afternoon. Explained this is for somewhere for him to discharge to and live. Pt believes this meeting is for a potential job and asked if \"it's a new building.\"    Kimber arrived at 3:30pm. Pt was agreeable to meeting with her for assessment.    Discharge Plan or Goal:  Memory care facility     Barriers to Discharge:  Patient requires further psychiatric stabilization due to current symptomology, medication management with changes subject to provider, coordination with outside supports, and aftercare planning. Pt is under civil commitment.     Referral Status:    Memory Care facilities currently in process:  [facility]. Marcus completed 11/27.  [facility]. Marcus completed 11/30. Per Clifford on 1/8, she would like to move forward with a referral. Records sent 1/8. RN assessment pending as of 1/14.  [facility]era (Exec Director): [phone]  [facility]. Marcus scheduled 1/8/25. Have immediate openings and will accept EW. Family not requesting referral yet.  [facility]. Per Clifford on 1/20, she would like to move forward with a referral. Records sent 1/20.  [email or web address]    Memory Care facilities pending family review:  [facility].  [facility].  [facility].  [facility].  [facility].    Memory Care facilities declined:  [facility] (private pay only, no EW).  [facility] [address] [facility] (private pay only, no EW).  [facility] (no openings).  [facility]. Tour completed 11/29. Records sent 12/2/24. Declined 12/19/24 (don't feel they are an appropriate facility for pt's needs).  Lorraine (): [email or web address]; [phone]  Isatu (director of nursing): [email or web address]  [facility].  Denied 12/26 (concerned about dementia with psychosis, history of hallucinations, high elopement risk, still on 1:1).  Nikki Noel: [email or web address]; Office [phone], Fax [phone]   [facility]. Not accepting EW as of 1/7/25.    Legal Status:  MI Commitment with [facility]  File Number: [id number]  Start and expiration date of commitment: 10/24/24 - 04/24/25    [facility] meds: Haldol, Clozaril, Risperdal, Invega, Zyprexa, Seroquel, Abilify    PPS/CM:  Shelby Chowdary: [phone]  [email or web address].    Contacts:  Maria C Aaron (Daughter): [phone]   Clifford Agnieszka (ex-wife): [phone]     No Del Angel (guardianship/conservatorship ): [phone]  [email or web address]    Derrell Duff (MnCHOICE ): [phone]  [email or web address].)     Upcoming Meetings and Dates/Important Information and next steps:  Follow up with family regarding list of Memory Care facilities  Discharge planning when appropriate  Pt does not qualify for MA per financial counselor on  11/8/2024. Pt will likely privately pay for Memory Care until he qualifies for MA/waivers in the future.    Provisional Discharge and Change of Status needed at discharge

## 2025-01-21 PROCEDURE — 124N000002 HC R&B MH UMMC

## 2025-01-21 PROCEDURE — 250N000013 HC RX MED GY IP 250 OP 250 PS 637

## 2025-01-21 PROCEDURE — 99231 SBSQ HOSP IP/OBS SF/LOW 25: CPT | Mod: GC | Performed by: PSYCHIATRY & NEUROLOGY

## 2025-01-21 RX ADMIN — FUROSEMIDE 40 MG: 40 TABLET ORAL at 08:49

## 2025-01-21 RX ADMIN — SENNOSIDES AND DOCUSATE SODIUM 1 TABLET: 50; 8.6 TABLET ORAL at 08:49

## 2025-01-21 RX ADMIN — CLONIDINE HYDROCHLORIDE 0.1 MG: 0.1 TABLET ORAL at 20:26

## 2025-01-21 RX ADMIN — Medication 1 PATCH: at 08:50

## 2025-01-21 RX ADMIN — ATORVASTATIN CALCIUM 40 MG: 20 TABLET, FILM COATED ORAL at 08:48

## 2025-01-21 RX ADMIN — Medication 1250 MCG: at 10:59

## 2025-01-21 RX ADMIN — METOPROLOL SUCCINATE 50 MG: 50 TABLET, EXTENDED RELEASE ORAL at 08:49

## 2025-01-21 RX ADMIN — AMLODIPINE BESYLATE 10 MG: 5 TABLET ORAL at 08:48

## 2025-01-21 RX ADMIN — LISINOPRIL 40 MG: 10 TABLET ORAL at 08:48

## 2025-01-21 RX ADMIN — OLANZAPINE 5 MG: 5 TABLET, ORALLY DISINTEGRATING ORAL at 20:28

## 2025-01-21 RX ADMIN — CLONIDINE HYDROCHLORIDE 0.1 MG: 0.1 TABLET ORAL at 08:48

## 2025-01-21 RX ADMIN — Medication 25 MG: at 20:28

## 2025-01-21 RX ADMIN — MELATONIN TAB 3 MG 3 MG: 3 TAB at 20:27

## 2025-01-21 RX ADMIN — CINACALCET 30 MG: 30 TABLET ORAL at 08:49

## 2025-01-21 ASSESSMENT — ACTIVITIES OF DAILY LIVING (ADL)
ADLS_ACUITY_SCORE: 66
ORAL_HYGIENE: INDEPENDENT
ADLS_ACUITY_SCORE: 66
ADLS_ACUITY_SCORE: 66
DRESS: INDEPENDENT
ADLS_ACUITY_SCORE: 66
DRESS: INDEPENDENT
ADLS_ACUITY_SCORE: 66
ORAL_HYGIENE: INDEPENDENT
ADLS_ACUITY_SCORE: 66
ADLS_ACUITY_SCORE: 66
HYGIENE/GROOMING: INDEPENDENT
ADLS_ACUITY_SCORE: 66
HYGIENE/GROOMING: INDEPENDENT
ADLS_ACUITY_SCORE: 66

## 2025-01-21 NOTE — PLAN OF CARE
Team Note Due:  Monday    Assessment/Intervention/Current Symtoms and Care Coordination:  Chart review and met with team, discussed pt progress, symptomology, and response to treatment.  Discussed the discharge plan and any potential impediments to discharge. Clifford Aaron was emergency guardian/conservator until 01/20/2025. A petition for permanent guardianship/conservatorship has been filed.    Waiting for update from [facility] with final approval.    Discharge Plan or Goal:  Memory care facility     Barriers to Discharge:  Patient requires further psychiatric stabilization due to current symptomology, medication management with changes subject to provider, coordination with outside supports, and aftercare planning. Pt is under civil commitment.     Referral Status:    Memory Care facilities currently in process:  [facility]. Tour completed 11/27.  [facility]. Tour completed 11/30. Per Clifford on 1/8, she would like to move forward with a referral. RN assessment completed 1/20 (RN approved pt). Awaiting approval from corporate.  Vera (Exec Director): [phone]  [facility]. Tour scheduled 1/8/25. Have immediate openings and will accept EW. Family not requesting referral yet.  [facility]. Per Clifford on 1/20, she would like to move forward with a referral. Records sent 1/20.  [email or web address]    Memory Care facilities pending family review:  [facility].  [facility].  [facility].  [facility].  [facility].    Memory Care facilities declined:  [facility] (private pay only, no EW).  [facility] (private pay only, no EW).  [facility] (no openings).  [facility]. Tour completed 11/29. Records sent 12/2/24. Declined 12/19/24 (don't feel they are an appropriate  facility for pt's needs).  Lorraine (): [email or web address]; [phone]  Isatu (director of nursing): [email or web address]  [facility].  Denied 12/26 (concerned about dementia with psychosis, history of hallucinations, high elopement risk, still on 1:1).  Nikki Noel: [email or web address]; Office [phone], Fax [phone]   [facility]. Not accepting EW as of 1/7/25.    Legal Status:  [facility]  File Number: [id number]  Start and expiration date of commitment: 10/24/24 - 04/24/25    [facility] meds: Haldol, Clozaril, Risperdal, Invega, Zyprexa, Seroquel, Abilify    PPS/CM:  Shelby Chowdary: [phone]  [email or web address].    Contacts:  Maria C Aaron (Daughter): [phone]   Clifford Aaron (ex-wife): [phone]     No Del Angel (guardianship/conservatorship ): [phone]  [email or web address]    Derrell Duff (MnCHOICE ): [phone]  [email or web address].)     Upcoming Meetings and Dates/Important Information and next steps:  Follow up with family regarding list of Memory Care facilities  Discharge planning when appropriate  Pt does not qualify for MA per financial counselor on 11/8/2024. Pt will likely privately pay for Memory Care until he qualifies for MA/waivers in the future.    Provisional Discharge and Change of Status needed at discharge

## 2025-01-21 NOTE — PLAN OF CARE
"  Problem: Adult Behavioral Health Plan of Care  Goal: Patient-Specific Goal (Individualization)  Description: You can add care plan individualizations to a care plan. Examples of Individualization might be:  \"Parent requests to be called daily at 9am for status\", \"I have a hard time hearing out of my right ear\", or \"Do not touch me to wake me up as it startles  me\".  Outcome: Progressing  Goal: Adheres to Safety Considerations for Self and Others  Outcome: Progressing     Problem: Psychotic Signs/Symptoms  Goal: Improved Behavioral Control (Psychotic Signs/Symptoms)  Outcome: Progressing     Problem: Suicidal Behavior  Goal: Suicidal Behavior is Absent or Managed  Outcome: Progressing   Goal Outcome Evaluation:    Pt is visible in the milieu. Pleasant and cooperative, he is compliant  with medications. Continues to decline shower. Pt  hydration and nutrition is adequate. Pt sleeps off and on in his chair while watching movies. Pt denied anxiety, SI and hallucinations. He said he feels depressed because he wants to go home and he just \"had it here\". Pt took off his silver stockings. Continues to have bilateral edema on lower extremities. Pt interacts with peers and staff. He is able to  verbalize small needs. He has intermittent confusion and has poverty of words.         "

## 2025-01-21 NOTE — PLAN OF CARE
BEH IP Unit Acuity Rating Score (UARS)  Patient is given one point for every criteria they meet.    CRITERIA SCORING   On a 72 hour hold, court hold, committed, stay of commitment, or revocation. 1    Patient LOS on BEH unit exceeds 20 days. 1  LOS: 101   Patient under guardianship, 55+, otherwise medically complex, or under age 11. 1   Suicide ideation without relief of precipitating factors. 0   Current plan for suicide. 0   Current plan for homicide. 0   Imminent risk or actual attempt to seriously harm another without relief of factors precipitating the attempt. 1   Severe dysfunction in daily living (ex: complete neglect for self care, extreme disruption in vegetative function, extreme deterioration in social interactions). 1   Recent (last 7 days) or current physical aggression in the ED or on unit. 0   Restraints or seclusion episode in past 72 hours. 0   Recent (last 7 days) or current verbal aggression, agitation, yelling, etc., while in the ED or unit. 0   Active psychosis. 1   Need for constant or near constant redirection (from leaving, from others, etc).  0   Intrusive or disruptive behaviors. 1   Patient requires 3 or more hours of individualized nursing care per 8-hour shift (i.e. for ADLs, meds, therapeutic interventions). 1   TOTAL 8

## 2025-01-21 NOTE — PROGRESS NOTES
"  ----------------------------------------------------------------------------------------------------------  [facility]  Psychiatry Progress Note  Hospital Day #101     Interim History:     The patient's care was discussed with the treatment team and chart notes were reviewed.    Identifier: Estevan Aaron is a 64 year old male with previous psychiatric diagnoses of  generalized anxiety disorder, Neurocognitive disorder, admitted from the ED 10/12/2024 due to concern for HI and psychosis in the context of medical issues (hyperthyroidism, hypercalcemia) psychosocial stressors including family dynamics with recent possible divorce.    Sleep: 6 hours (01/21/25 0600)  Scheduled medications: Took all scheduled medications as prescribed  Psychiatric PRN medications: None    Staff Report:   No acute events overnight. In summary he has continued to be confused and selectively interactive with peers. He slept 3.75 hours. See staff notes for details.      Subjective:     Patient Interview:  Santos was interviewed in workroom. Weekend was great just messing around. Joking with team. Food has not been good. Notes that he feels unadjusted \"in the hotel\". Despite being informed that he has not been hospitalized for a year, she notes \"I am hoping to get over the heap. It has been over a year.\" Reoriented the patient. Does not have any acute concerns at this time.     ROS:  See above     Objective:     Vitals:  /78   Pulse 64   Temp 97.5  F (36.4  C)   Resp 18   Wt 114.6 kg (252 lb 9.6 oz)   SpO2 95%   BMI 40.77 kg/m      Allergies:  No Known Allergies    Current Medications:  Scheduled:  Current Facility-Administered Medications   Medication Dose Route Frequency Provider Last Rate Last Admin    acetaminophen (TYLENOL) tablet 650 mg  650 mg Oral Q4H PRN Nikki Caceres MD   650 mg at 01/09/25 0228    alum & mag hydroxide-simethicone (MAALOX) suspension 30 mL  30 mL Oral Q4H " PRN Nikki Caceres MD   30 mL at 10/17/24 0837    amLODIPine (NORVASC) tablet 10 mg  10 mg Oral Daily Presley Barrera MD   10 mg at 01/20/25 0822    atorvastatin (LIPITOR) tablet 40 mg  40 mg Oral Daily Nikki Caceres MD   40 mg at 01/20/25 0823    cholecalciferol (VITAMIN D3) capsule 1,250 mcg  1,250 mcg Oral Q7 Days Evelia Grimm DO   1,250 mcg at 01/14/25 1053    cinacalcet (SENSIPAR) tablet 30 mg  30 mg Oral Daily Presley Barrera MD   30 mg at 01/20/25 0823    cloNIDine (CATAPRES) tablet 0.1 mg  0.1 mg Oral BID Presley Barrera MD   0.1 mg at 01/20/25 2120    diclofenac (VOLTAREN) 1 % topical gel 2 g  2 g Topical 4x Daily PRN Rocío Lopez MD   2 g at 12/22/24 2032    furosemide (LASIX) tablet 40 mg  40 mg Oral Daily Presley Barrera MD   40 mg at 01/20/25 0823    gabapentin (NEURONTIN) capsule 100 mg  100 mg Oral Q6H PRN Nikki Caceres MD   100 mg at 12/24/24 0046    hydrocortisone (CORTAID) 0.5 % cream   Topical Daily PRN Savi Chamorro MD        Lidocaine (LIDOCARE) 4 % Patch 1 patch  1 patch Transdermal Q24H PRN Rocío Lopez MD   1 patch at 12/22/24 2023    lisinopril (ZESTRIL) tablet 40 mg  40 mg Oral Daily Presley Barrera MD   40 mg at 01/20/25 0823    melatonin tablet 3 mg  3 mg Oral At Bedtime Presley Barrera MD   3 mg at 01/20/25 2120    metoprolol succinate ER (TOPROL XL) 24 hr tablet 50 mg  50 mg Oral Daily Presley Barrera MD   50 mg at 01/20/25 0823    nicotine (NICODERM CQ) 14 MG/24HR 24 hr patch 1 patch  1 patch Transdermal Daily Evelia Grimm DO   1 patch at 01/20/25 0826    nicotine (NICORETTE) gum 2 mg  2 mg Buccal Q1H PRN González Garcia MD   2 mg at 10/24/24 1254    OLANZapine zydis (zyPREXA) ODT tab 5 mg  5 mg Oral At Bedtime González Garcia MD   5 mg at 01/20/25 2121    Or    OLANZapine (zyPREXA) injection 10 mg  10 mg Intramuscular At Bedtime González Garcia MD   10 mg at 01/12/25 2211    OLANZapine (zyPREXA) tablet 5 mg   5 mg Oral TID PRN Evelia Grimm DO   5 mg at 01/05/25 1645    Or    OLANZapine (zyPREXA) injection 5 mg  5 mg Intramuscular TID PRN Evelia Grimm DO        polyethylene glycol (MIRALAX) Packet 17 g  17 g Oral Daily PRN Nikki Caceres MD        senna-docusate (SENOKOT-S/PERICOLACE) 8.6-50 MG per tablet 1 tablet  1 tablet Oral Daily Evelia Grimm DO   1 tablet at 01/20/25 0823    traZODone (DESYREL) half-tab 25 mg  25 mg Oral At Bedtime Rocío Lopez MD   25 mg at 01/20/25 2120    traZODone (DESYREL) half-tab 25 mg  25 mg Oral At Bedtime PRN Evelia Grimm DO   25 mg at 12/24/24 0046       PRN:  Current Facility-Administered Medications   Medication Dose Route Frequency Provider Last Rate Last Admin    acetaminophen (TYLENOL) tablet 650 mg  650 mg Oral Q4H PRN Nikki Caceres MD   650 mg at 01/09/25 0228    alum & mag hydroxide-simethicone (MAALOX) suspension 30 mL  30 mL Oral Q4H PRN Nikki Caceres MD   30 mL at 10/17/24 0837    amLODIPine (NORVASC) tablet 10 mg  10 mg Oral Daily Presley Barrera MD   10 mg at 01/20/25 0822    atorvastatin (LIPITOR) tablet 40 mg  40 mg Oral Daily Nikki Caceres MD   40 mg at 01/20/25 0823    cholecalciferol (VITAMIN D3) capsule 1,250 mcg  1,250 mcg Oral Q7 Days Evelia Grimm DO   1,250 mcg at 01/14/25 1053    cinacalcet (SENSIPAR) tablet 30 mg  30 mg Oral Daily Presley Barrera MD   30 mg at 01/20/25 0823    cloNIDine (CATAPRES) tablet 0.1 mg  0.1 mg Oral BID Presley Barrera MD   0.1 mg at 01/20/25 2120    diclofenac (VOLTAREN) 1 % topical gel 2 g  2 g Topical 4x Daily PRN Rocío Lopez MD   2 g at 12/22/24 2032    furosemide (LASIX) tablet 40 mg  40 mg Oral Daily Presley Barrera MD   40 mg at 01/20/25 0823    gabapentin (NEURONTIN) capsule 100 mg  100 mg Oral Q6H PRN Nikki Caceres MD   100 mg at 12/24/24 0046    hydrocortisone (CORTAID) 0.5 % cream   Topical Daily PRN Savi Chamorro MD        Lidocaine (LIDOCARE) 4 %  Patch 1 patch  1 patch Transdermal Q24H PRN Rocío Lopez MD   1 patch at 12/22/24 2023    lisinopril (ZESTRIL) tablet 40 mg  40 mg Oral Daily Presley Barrera MD   40 mg at 01/20/25 0823    melatonin tablet 3 mg  3 mg Oral At Bedtime Presley Barrera MD   3 mg at 01/20/25 2120    metoprolol succinate ER (TOPROL XL) 24 hr tablet 50 mg  50 mg Oral Daily Presley Barrera MD   50 mg at 01/20/25 0823    nicotine (NICODERM CQ) 14 MG/24HR 24 hr patch 1 patch  1 patch Transdermal Daily Evelia Grimm DO   1 patch at 01/20/25 0826    nicotine (NICORETTE) gum 2 mg  2 mg Buccal Q1H PRN González Garcia MD   2 mg at 10/24/24 1254    OLANZapine zydis (zyPREXA) ODT tab 5 mg  5 mg Oral At Bedtime González Garcia MD   5 mg at 01/20/25 2121    Or    OLANZapine (zyPREXA) injection 10 mg  10 mg Intramuscular At Bedtime González Garcia MD   10 mg at 01/12/25 2211    OLANZapine (zyPREXA) tablet 5 mg  5 mg Oral TID PRN Eveila Grimm DO   5 mg at 01/05/25 1645    Or    OLANZapine (zyPREXA) injection 5 mg  5 mg Intramuscular TID PRN Evelia Grimm DO        polyethylene glycol (MIRALAX) Packet 17 g  17 g Oral Daily PRN Nikki Caceres MD        senna-docusate (SENOKOT-S/PERICOLACE) 8.6-50 MG per tablet 1 tablet  1 tablet Oral Daily Evelia Grimm DO   1 tablet at 01/20/25 0823    traZODone (DESYREL) half-tab 25 mg  25 mg Oral At Bedtime Rocío Lopez MD   25 mg at 01/20/25 2120    traZODone (DESYREL) half-tab 25 mg  25 mg Oral At Bedtime PRN Evelia Grimm DO   25 mg at 12/24/24 0046     Labs and Imaging:  Data this admission:  - CBC unremarkable  - CMP unremarkable  - TSH normal  - UDS negative  - Vit D low  - Hgb A1c 5.9 (10/13/24)  - Lipids unremarkable  - Vit B12 normal  - Folate normal  - Urinalysis unremarkable  - EKG normal sinus rhythm, QTc 390 ms  - Head CT showed no acute changes  - HIV non reactive  - Treponema antibody non reactive  - ESR wnl   - Ceruloplasmin wnl   - BOOGIE negative  - Lyme  "negative      Parathyroid hormone:   85 (10/13)     Calcium:  11.4 (10/14) -> 10.3 (10/16) -> 9.8 (10/19) -> 10.6 (10/21) -> 10.3 (10/23)     Albumin:  4.3 (10/14) -->  4.3 (10/16) -> 4.1 (10/17) -> 4.2 (10/19) -> 4.5 (10/21) -> 4.3 (10/23)      Mental Status Exam:   Oriented to:  Person/Self  General:  Awake and Alert  Appearance:  appears stated age, Grooming is adequate, Dressed in own clothing, and overweight  Behavior/Attitude:  Calm and Disengaged  Eye Contact: Avoids or Evasive  Psychomotor: Normal no catatonia present  Speech:  appropriate volume/tone  Language: Fluent in English with appropriate syntax and vocabulary.  Mood:  \"wonderful\"  Affect:  appropriate and congruent with mood  Thought Process:  impoverished and confused  Thought Content:   No SI/HI/AH/VH; No apparent delusions  Associations:  loose  Insight:  impaired due to neurocognitive disorder   Judgment:  impaired due to neurocognitive disorder  Impulse control: impaired  Attention Span:  inattentive  Concentration:   impaired by neurocognitive disorder  Recent and Remote Memory:   remote memory intact, recent memory impaired  Fund of Knowledge: average  Muscle Strength and Tone: normal  Gait and Station: Normal     Psychiatric Assessment     Estevan Aaron is a 64 year old male with previous psychiatric diagnoses of GEORGINA admitted from the ER on 10/12/2024 due to concern for HI and psychosis in the context of medical issues (hyperthyroidism, hypercalcemia), psychosocial stressors including with recent divorce and selling his home. This is the patient's first psychiatric hospitalization. The MSE on admission was pertinent for a thought process which was perseverative, circumstantial, tangential, disorganized and tangential; with rambling and looseness of associations. Psychological contributions to presentation included lack of insight. Social factors contributing to presentation included isolation, recent divorce, selling his house, and moving " from hotel to hotel. Biological contributions to presentation included a history of hyperparathyroidism with chronic hypercalcemia per charts as well as a history of methamphetamine use per collateral from ex-wife.     Per collateral from ex-wife, Santos has had paranoid ideations since they first met. He has always felt like people are out to get him or trying to rip him off. She says that he has had visual hallucinations (he will point things out that the rest of the family can't see) for a long time, but the family would just go along with him to avoid making him angry. This timeline and presentation could be consistent with diagnosis of paranoid personality disorder. Things began to worsen around the beginning of the COVID pandemic, when Santos became more isolated and the family started to notice cognitive issues such as impaired memory. Other things that could be contributing to his presentation is hyperparathyroidism with hypercalcemia. However, patient's calcium returned to normal limits on 10/16, and patient continues to have disorganized behavior unrelated to calcium elevation, so likely this is not a significant contributing factor to patient's presentation.      Currently, Santos is at times disoriented, but easily re-directable. Presents with some difficulty of word articulation, which contributes to his frustration when interacting with psych team, as he finds it difficult to explain himself. He is able to take care of his ADLs without much assistance and he is mostly independent in the unit. On the other hand, his confused behavior tend to increase in the evenings, seemingly related to  Neurocognitive Disorder diagnosis, and this could evolved to be his new baseline. Either way, Santos does not present as a danger to himself or other so his SIO was discontinued. Santos does not present with any new episodes of physical agitation and is able to attend groups and interact with peers without major altercations. Patient  currently is stable with current neuroleptic dose, patient probably wont benefit from any modification in current therapy.     Family care meeting 01/02 with psych team: update on current availability of placement options and Santos's current presentation. Family aware of Santos's level of functionality with ADLs, besides  disorientation episodes at evenings, that only requires minimal intervention for redirection. Still pending visits to possible memory care placements by family.    Santos appeared more confused throughout the day, different from his usual baseline, per staff report. No changed in vital signs but concern for acute delirium. UA was requested.    Goal of treatment:  Procure a memory care placement.      Psychiatric Plan by Diagnosis     Today's changes:  - No medication changes.     # Major Neurocognitive Disorder  1. Medications:  - Olanzapine 5 mg at bedtime     3. Additional Plans:  - Patient will be treated in therapeutic milieu with appropriate individual and group therapies as described  - Pending memory facility placement.      # Unspecified anxiety vs Generalized Anxiety Disorder  - Monitor for symptoms.  - Fluoxetine held due to suspicion of ongoing manic symptoms     Psychiatric Hospital Course:      Estevan Aaron was admitted to [facility] on a 72 hour hold.   Medications:  PTA fluoxetine was held due to concern for worsening of zelalem   New medications started at the time of admission include Zyprexa.   Increased olanzapine 10 mg at bedtime was to 10 mg BID (10/14)   Increased olanzapine 10 mg BID to 10 mg during day and 15 mg at bedtime (10/15)  10/21: increased olanzapine pm dose from 15 to 20 mg  10/23: started melatonin 3 mg at 7 pm to help patient with circadian rhythm as he has been staying up throughout the night and sleeping a lot during the day and there is some concern for delirium   10/24: consulted anesthesia for MRI brain   10/28: MRI brain complete, decrease AM olanzapine from 10 to 5  mg  10/29: Morning olanzapine decreased to 2.5 mg, evening olanzapine decreased to 15 mg   11/1: Decreased evening olanzapine to 10 mg   11/4: Decreased evening olanzapine to 7.5 mg   11/5: Decreased evening olanzapine to 5 mg   11/11: Moved morning dose of olanzapine to the afternoon as patient appears more agitated in the afternoons/evenings  11/21: Started memantine 5 mg daily   11/26: Discontinued olanzapine 2.5 mg during the afternoon  12/2:   Discontinued memantine 5 mg, daily    The risks, benefits, alternatives, and side effects were discussed and understood by the patient and other caregivers.     Medical Assessment and Plan     Medical diagnoses to be addressed this admission:    # Hip Pain  - New right hip pain, and mild pain in multiple joints. Moderate response to topical antiinflammatory gel and lidocaine patch.   - Medicine consulted for recommendations 12/20    # CHF  # Hypertension  - Continue PTA medications  Furosemide 40 mg daily  Lisinopril 40 mg daily  Metoprolol 50 mg daily  Amlodipine 10 mg daily  Clonidine 0.1 mg BID  - Pitting edema in lower extremities, not painful 3+: Medicine consulted for recommendations 12/20  - Weight gain of about 8 pounds in about 2 months currently 249lb as of 01/07/25    # Hyperlipidemia  - Continue PTA Atorvastatin 40 mg     # Primary Hyperparathyroidism  # Hypercalcemia, hypophosphoremia   Increased Ca level to 10.9 and decreased Ph level to 2.3 in the ED   - Consulted endocrinology, who started cinacalcet 30 mg BID on 10/13  - 10/16 endocrinology recommended continuing to trend calcium and albumin to make sure patient does not become hypocalcemic and recommended holding cinacalcet   - 10/17, calcium and albumin wnl, endo recommended cinacalcet 30 mg once daily   - 10/21, per endo continue cincaclcet and recheck calcium and albumin on October 24  - 10/23: per endo, patient needs to follow up outpatient for further management of hyperparathyroidism     #  Rhinorrhea  1/15 currently multiple COVID infections on unit. No other symptoms of COVID noted. COVID PCR - on 1/13.    Medical course: Patient was physically examined by the ED prior to being transferred to the unit and was found to be medically stable and appropriate for admission.      Consults: Psychiatry, Endocrinology (follow-up of hyperthyroidism / hypercalcemia and hypertension), neurology     Checklist     Legal Status: Committed   Ortega meds: Haldol, Clozaril, Risperdal, Invega, Zyprexa, Seroquel, Abilify    Safety Assessment:   Behavioral Orders   Procedures    Code 1 - Restrict to Unit    Routine Programming     As clinically indicated    Status 15     Every 15 minutes.    Status Individual Observation     Patient SIO status reviewed with team/RN.  Please also refer to RN/team documentation for add'l detail.    -SIO staff to monitor following which have contributed to patient being on SIO:  Patient intrusiveness to other patients  -Possible interventions SIO staff could use to support patient's treatment progress:  Redirection  -When following observed, team will review discontinuation of SIO:  Improvement in intrusive behavior.     Order Specific Question:   CONTINUOUS 24 hours / day     Answer:   Other     Order Specific Question:   Specify distance     Answer:   10 feet     Order Specific Question:   Indications for SIO     Answer:   Severe intrusiveness       Risk Assessment:  Risk for harm is low  Risk factors: impulsive and past behaviors  Protective factors: family      SIO: No    Disposition: Pending assessment for memory care facility.       Attestations     This patient was seen and discussed with my attending physician.  Evelia Grimm DO  PGY-1 Psychiatry Resident       Attestation:  This patient has been seen and evaluated by me, Pete Smith MD.  I have discussed this patient with the house staff team including the resident and/or medical student and I agree with the findings and plan in this  note.    I have reviewed today's vital signs, medications, labs and imaging. Pete Smith MD , PhD.

## 2025-01-21 NOTE — PROVIDER NOTIFICATION
01/21/25 0924   Individualization/Patient Specific Goals   Patient Personal Strengths resourceful;resilient;positive vocational history;ability to maintain sobriety   Patient Vulnerabilities housing insecurity;lacks insight into illness;poor impulse control   Interprofessional Rounds   Summary Discussed patient progress and RN assessment for [facility] completed yesterday. Pending corporate approval for placement at [place].   Participants nursing;CTC;psychiatrist;other (see comments);pharmacy   Behavioral Team Discussion   Participants Dr. Smith; Dr. Grimm; Rocío Curry RN; Kylee Becerra MA [facility]; medical students; pharmacy   Progress Pt has progressed   Anticipated length of stay 60+ days   Continued Stay Criteria/Rationale Medication management; symptom stabilization; care coordination   Medical/Physical See H&P   Precautions See below   Plan Psychiatric assessment/Medication management. Therapeutic Milieu. Individual care planning and after care planning. Patient to participate in unit groups and activities. Individual and group support on unit.   Safety Plan Completed by unit therapist   Anticipated Discharge Disposition another healthcare facility     PRECAUTIONS AND SAFETY    Behavioral Orders   Procedures    Code 1 - Restrict to Unit    Routine Programming     As clinically indicated    Status 15     Every 15 minutes.    Status Individual Observation     Patient SIO status reviewed with team/RN.  Please also refer to RN/team documentation for add'l detail.    -SIO staff to monitor following which have contributed to patient being on SIO:  Patient intrusiveness to other patients  -Possible interventions SIO staff could use to support patient's treatment progress:  Redirection  -When following observed, team will review discontinuation of SIO:  Improvement in intrusive behavior.     Order Specific Question:   CONTINUOUS 24 hours / day     Answer:   Other     Order Specific Question:    Specify distance     Answer:   10 feet     Order Specific Question:   Indications for SIO     Answer:   Severe intrusiveness       Safety  Safety WDL: WDL  Patient Location: lounge, patient room, own  Observed Behavior: calm, sitting  Observed Behavior (Comment): sitting  Airway Safety Measures: other (see comments)  Safety Measures: 1:1 observation maintained, environmental rounds completed, safety rounds completed  Diversional Activity: television  De-Escalation Techniques: verbally redirected, 1:1 observation initiated  Suicidality: Status 15  Assault: private room  Elopement Assessment: Loitering near exit doors, Statements about wanting to leave  Elopement Interventions: status 15  Sexual: status 15

## 2025-01-21 NOTE — PLAN OF CARE
Problem: Psychotic Signs/Symptoms  Goal: Optimal Cognitive Function (Psychotic Signs/Symptoms)  Outcome: Progressing  Intervention: Support and Promote Cognitive Ability  Recent Flowsheet Documentation  Taken 1/21/2025 1612 by Tamie Fraser RN  Communication Support Strategies: active listening utilized     Problem: Manic or Hypomanic Signs/Symptoms  Goal: Improved Mood Symptoms (Manic/Hypomanic Signs/Symptoms)  Outcome: Progressing  Intervention: Optimize Emotion and Mood  Recent Flowsheet Documentation  Taken 1/21/2025 1612 by Tamie Fraser RN  Supportive Measures: active listening utilized  Diversional Activity: television   Goal Outcome Evaluation:    Pt was visible in the lounge watching TV and engaging with select peers. Affect flat and blunted. Pt denied pain and all mental health and psych symptoms. P O intake adequate. Pt was compliant with medications and contracted for safety. No behavioral concerns this shift.

## 2025-01-22 PROCEDURE — 250N000013 HC RX MED GY IP 250 OP 250 PS 637

## 2025-01-22 PROCEDURE — 99231 SBSQ HOSP IP/OBS SF/LOW 25: CPT | Mod: GC | Performed by: PSYCHIATRY & NEUROLOGY

## 2025-01-22 PROCEDURE — 124N000002 HC R&B MH UMMC

## 2025-01-22 RX ADMIN — LISINOPRIL 40 MG: 10 TABLET ORAL at 09:00

## 2025-01-22 RX ADMIN — OLANZAPINE 5 MG: 5 TABLET, ORALLY DISINTEGRATING ORAL at 20:43

## 2025-01-22 RX ADMIN — SENNOSIDES AND DOCUSATE SODIUM 1 TABLET: 50; 8.6 TABLET ORAL at 09:00

## 2025-01-22 RX ADMIN — ATORVASTATIN CALCIUM 40 MG: 20 TABLET, FILM COATED ORAL at 09:01

## 2025-01-22 RX ADMIN — AMLODIPINE BESYLATE 10 MG: 5 TABLET ORAL at 09:00

## 2025-01-22 RX ADMIN — Medication 25 MG: at 20:43

## 2025-01-22 RX ADMIN — CINACALCET 30 MG: 30 TABLET ORAL at 09:01

## 2025-01-22 RX ADMIN — FUROSEMIDE 40 MG: 40 TABLET ORAL at 09:00

## 2025-01-22 RX ADMIN — Medication 1 PATCH: at 09:01

## 2025-01-22 RX ADMIN — CLONIDINE HYDROCHLORIDE 0.1 MG: 0.1 TABLET ORAL at 09:00

## 2025-01-22 RX ADMIN — MELATONIN TAB 3 MG 3 MG: 3 TAB at 20:42

## 2025-01-22 RX ADMIN — CLONIDINE HYDROCHLORIDE 0.1 MG: 0.1 TABLET ORAL at 20:42

## 2025-01-22 RX ADMIN — METOPROLOL SUCCINATE 50 MG: 50 TABLET, EXTENDED RELEASE ORAL at 09:00

## 2025-01-22 ASSESSMENT — ACTIVITIES OF DAILY LIVING (ADL)
ADLS_ACUITY_SCORE: 66
LAUNDRY: WITH SUPERVISION
ORAL_HYGIENE: INDEPENDENT
ADLS_ACUITY_SCORE: 66
ORAL_HYGIENE: INDEPENDENT
ADLS_ACUITY_SCORE: 66
ADLS_ACUITY_SCORE: 66
DRESS: STREET CLOTHES;INDEPENDENT
ADLS_ACUITY_SCORE: 66
DRESS: INDEPENDENT
ADLS_ACUITY_SCORE: 66
HYGIENE/GROOMING: INDEPENDENT
ADLS_ACUITY_SCORE: 66
HYGIENE/GROOMING: INDEPENDENT
ADLS_ACUITY_SCORE: 66
ADLS_ACUITY_SCORE: 66

## 2025-01-22 NOTE — PLAN OF CARE
Problem: Psychotic Signs/Symptoms  Goal: Improved Behavioral Control (Psychotic Signs/Symptoms)  Outcome: Progressing     Problem: Manic or Hypomanic Signs/Symptoms  Goal: Improved Impulse Control (Manic/Hypomanic Signs/Symptoms)  Outcome: Progressing   Goal Outcome Evaluation:      Problem: Adult Behavioral Health Plan of Care  Goal: Absence of New-Onset Illness or Injury  Intervention: Prevent Infection  Recent Flowsheet Documentation  Taken 1/22/2025 1650 by Tamie Fraser RN  Infection Prevention: other (see comments)  Intervention: Prevent Infection  Recent Flowsheet Documentation  Taken 1/22/2025 1650 by Tamie Fraser RN  Infection Prevention: other (see comments)     Problem: Psychotic Signs/Symptoms  Goal: Optimal Cognitive Function (Psychotic Signs/Symptoms)  Intervention: Support and Promote Cognitive Ability  Recent Flowsheet Documentation  Taken 1/22/2025 1610 by Tamie Fraser RN  Communication Support Strategies: active listening utilized  Goal: Improved Psychomotor Symptoms (Psychotic Signs/Symptoms)  Intervention: Manage Psychomotor Movement  Recent Flowsheet Documentation  Taken 1/22/2025 1610 by Tamie Fraser RN  Activity (Behavioral Health): up ad diya     Problem: Suicidal Behavior  Goal: Suicidal Behavior is Absent or Managed  Intervention: Provide Immediate and Ongoing Protective Physical Environment  Recent Flowsheet Documentation  Taken 1/22/2025 1610 by Tamie Fraser RN  Safe Transition Promotion: protective factors promoted     Problem: Manic or Hypomanic Signs/Symptoms  Goal: Improved Psychomotor Symptoms (Manic/Hypomanic Signs/Symptoms)  Intervention: Manage Psychomotor Movement  Recent Flowsheet Documentation  Taken 1/22/2025 1610 by Tamie Fraser RN  Activity (Behavioral Health): up ad diya   Pt was visible in the lounge watching TV with peers. He was also observer pacing the marmolejo. Affect labile. Mood is calm. Pt denied pain and was dismissive with the  rest of mental health and psych symptoms. Food and fluid intake adequate. Pt was compliant with medications and contracted for safety. No behavioral escalation noted this shift.

## 2025-01-22 NOTE — PLAN OF CARE
Problem: Sleep Disturbance  Goal: Adequate Sleep/Rest  Outcome: Progressing   Goal Outcome Evaluation:    Pt had been in and out of his room this shift. He appears to have slept for about 5 hours. Pt denies pain and all mental health and psych symptoms. Continue to monitor and offer support.    Blood pressure 120/59, pulse 64, temperature 98  F (36.7  C), temperature source Temporal, resp. rate 18, weight 114.6 kg (252 lb 9.6 oz), SpO2 94%.

## 2025-01-22 NOTE — PLAN OF CARE
BEH IP Unit Acuity Rating Score (UARS)  Patient is given one point for every criteria they meet.    CRITERIA SCORING   On a 72 hour hold, court hold, committed, stay of commitment, or revocation. 1    Patient LOS on BEH unit exceeds 20 days. 1  LOS: 102   Patient under guardianship, 55+, otherwise medically complex, or under age 11. 1   Suicide ideation without relief of precipitating factors. 0   Current plan for suicide. 0   Current plan for homicide. 0   Imminent risk or actual attempt to seriously harm another without relief of factors precipitating the attempt. 1   Severe dysfunction in daily living (ex: complete neglect for self care, extreme disruption in vegetative function, extreme deterioration in social interactions). 1   Recent (last 7 days) or current physical aggression in the ED or on unit. 0   Restraints or seclusion episode in past 72 hours. 0   Recent (last 7 days) or current verbal aggression, agitation, yelling, etc., while in the ED or unit. 0   Active psychosis. 1   Need for constant or near constant redirection (from leaving, from others, etc).  0   Intrusive or disruptive behaviors. 1   Patient requires 3 or more hours of individualized nursing care per 8-hour shift (i.e. for ADLs, meds, therapeutic interventions). 1   TOTAL 8

## 2025-01-22 NOTE — PROGRESS NOTES
----------------------------------------------------------------------------------------------------------  [facility]  Psychiatry Progress Note  Hospital Day #102     Interim History:     The patient's care was discussed with the treatment team and chart notes were reviewed.    Identifier: Estevan Aaron is a 64 year old male with previous psychiatric diagnoses of  generalized anxiety disorder, Neurocognitive disorder, admitted from the ED 10/12/2024 due to concern for HI and psychosis in the context of medical issues (hyperthyroidism, hypercalcemia) psychosocial stressors including family dynamics with recent possible divorce.    Sleep: 5 hours (01/22/25 0600)  Scheduled medications: Took all scheduled medications as prescribed  Psychiatric PRN medications: None    Staff Report: (updated for 1/22/25)  Pt had been in and out of his room this shift. He appears to have slept for about 5 hours. Pt denies pain and all mental health and psych symptoms. Continue to monitor and offer support.      Subjective:     Patient Interview:  Santos was interviewed in the milieu. Was concerned about his dog, is uncertain where he was staying prior to admission. Says he is bummed out because he is worried about the dog. Endorses that his sleep, eating, and bowel movements are all fine.     ROS:  See above     Objective:     Vitals:  /59   Pulse 64   Temp 98  F (36.7  C) (Temporal)   Resp 18   Wt 114.6 kg (252 lb 9.6 oz)   SpO2 94%   BMI 40.77 kg/m      Allergies:  No Known Allergies    Current Medications:  Scheduled:  Current Facility-Administered Medications   Medication Dose Route Frequency Provider Last Rate Last Admin    acetaminophen (TYLENOL) tablet 650 mg  650 mg Oral Q4H PRN Nikki Caceres MD   650 mg at 01/09/25 0228    alum & mag hydroxide-simethicone (MAALOX) suspension 30 mL  30 mL Oral Q4H PRN Nikki Caceres MD   30 mL at 10/17/24 0837    amLODIPine  (NORVASC) tablet 10 mg  10 mg Oral Daily Presley Barrera MD   10 mg at 01/21/25 0848    atorvastatin (LIPITOR) tablet 40 mg  40 mg Oral Daily Nikki Caceres MD   40 mg at 01/21/25 0848    cholecalciferol (VITAMIN D3) capsule 1,250 mcg  1,250 mcg Oral Q7 Days Evelia Grimm DO   1,250 mcg at 01/21/25 1059    cinacalcet (SENSIPAR) tablet 30 mg  30 mg Oral Daily Presley Barrera MD   30 mg at 01/21/25 0849    cloNIDine (CATAPRES) tablet 0.1 mg  0.1 mg Oral BID Presley Barrera MD   0.1 mg at 01/21/25 2026    diclofenac (VOLTAREN) 1 % topical gel 2 g  2 g Topical 4x Daily PRN Rocío Lopez MD   2 g at 12/22/24 2032    furosemide (LASIX) tablet 40 mg  40 mg Oral Daily Presley Barrera MD   40 mg at 01/21/25 0849    gabapentin (NEURONTIN) capsule 100 mg  100 mg Oral Q6H PRN Nikki Caceres MD   100 mg at 12/24/24 0046    hydrocortisone (CORTAID) 0.5 % cream   Topical Daily PRN Savi Chamorro MD        Lidocaine (LIDOCARE) 4 % Patch 1 patch  1 patch Transdermal Q24H PRN Rocío Lopez MD   1 patch at 12/22/24 2023    lisinopril (ZESTRIL) tablet 40 mg  40 mg Oral Daily Presley Barrera MD   40 mg at 01/21/25 0848    melatonin tablet 3 mg  3 mg Oral At Bedtime Presley Barrera MD   3 mg at 01/21/25 2027    metoprolol succinate ER (TOPROL XL) 24 hr tablet 50 mg  50 mg Oral Daily Presley Barrera MD   50 mg at 01/21/25 0849    nicotine (NICODERM CQ) 14 MG/24HR 24 hr patch 1 patch  1 patch Transdermal Daily Evelia Grimm DO   1 patch at 01/21/25 0850    nicotine (NICORETTE) gum 2 mg  2 mg Buccal Q1H PRN González Garica MD   2 mg at 10/24/24 1254    OLANZapine zydis (zyPREXA) ODT tab 5 mg  5 mg Oral At Bedtime González Garcia MD   5 mg at 01/21/25 2028    Or    OLANZapine (zyPREXA) injection 10 mg  10 mg Intramuscular At Bedtime González Garcia MD   10 mg at 01/12/25 2211    OLANZapine (zyPREXA) tablet 5 mg  5 mg Oral TID PRN Evelia Grimm DO   5 mg at 01/05/25 1645    Or     OLANZapine (zyPREXA) injection 5 mg  5 mg Intramuscular TID PRN Evelia Grimm DO        polyethylene glycol (MIRALAX) Packet 17 g  17 g Oral Daily PRN Nikki Caceres MD        senna-docusate (SENOKOT-S/PERICOLACE) 8.6-50 MG per tablet 1 tablet  1 tablet Oral Daily Evelia Grimm DO   1 tablet at 01/21/25 0849    traZODone (DESYREL) half-tab 25 mg  25 mg Oral At Bedtime Rocío Lopez MD   25 mg at 01/21/25 2028    traZODone (DESYREL) half-tab 25 mg  25 mg Oral At Bedtime PRN Evelia Grimm DO   25 mg at 12/24/24 0046       PRN:  Current Facility-Administered Medications   Medication Dose Route Frequency Provider Last Rate Last Admin    acetaminophen (TYLENOL) tablet 650 mg  650 mg Oral Q4H PRN Nikki Caceres MD   650 mg at 01/09/25 0228    alum & mag hydroxide-simethicone (MAALOX) suspension 30 mL  30 mL Oral Q4H PRN Nikki Caceres MD   30 mL at 10/17/24 0837    amLODIPine (NORVASC) tablet 10 mg  10 mg Oral Daily Presley Barrera MD   10 mg at 01/21/25 0848    atorvastatin (LIPITOR) tablet 40 mg  40 mg Oral Daily Nikki Caceres MD   40 mg at 01/21/25 0848    cholecalciferol (VITAMIN D3) capsule 1,250 mcg  1,250 mcg Oral Q7 Days Evelia Grimm DO   1,250 mcg at 01/21/25 1059    cinacalcet (SENSIPAR) tablet 30 mg  30 mg Oral Daily Presley Barrera MD   30 mg at 01/21/25 0849    cloNIDine (CATAPRES) tablet 0.1 mg  0.1 mg Oral BID Presley Barrera MD   0.1 mg at 01/21/25 2026    diclofenac (VOLTAREN) 1 % topical gel 2 g  2 g Topical 4x Daily PRN Rocío Lopez MD   2 g at 12/22/24 2032    furosemide (LASIX) tablet 40 mg  40 mg Oral Daily Presley Barrera MD   40 mg at 01/21/25 0849    gabapentin (NEURONTIN) capsule 100 mg  100 mg Oral Q6H PRN Nikki Caceres MD   100 mg at 12/24/24 0046    hydrocortisone (CORTAID) 0.5 % cream   Topical Daily PRN Savi Chamorro MD        Lidocaine (LIDOCARE) 4 % Patch 1 patch  1 patch Transdermal Q24H PRN Rocío Lopez  MD Michelle   1 patch at 12/22/24 2023    lisinopril (ZESTRIL) tablet 40 mg  40 mg Oral Daily Presley Barrera MD   40 mg at 01/21/25 0848    melatonin tablet 3 mg  3 mg Oral At Bedtime Presley Barrera MD   3 mg at 01/21/25 2027    metoprolol succinate ER (TOPROL XL) 24 hr tablet 50 mg  50 mg Oral Daily Presley Barrera MD   50 mg at 01/21/25 0849    nicotine (NICODERM CQ) 14 MG/24HR 24 hr patch 1 patch  1 patch Transdermal Daily Evelia Grimm DO   1 patch at 01/21/25 0850    nicotine (NICORETTE) gum 2 mg  2 mg Buccal Q1H PRN González Garcia MD   2 mg at 10/24/24 1254    OLANZapine zydis (zyPREXA) ODT tab 5 mg  5 mg Oral At Bedtime González Garcia MD   5 mg at 01/21/25 2028    Or    OLANZapine (zyPREXA) injection 10 mg  10 mg Intramuscular At Bedtime González Garcia MD   10 mg at 01/12/25 2211    OLANZapine (zyPREXA) tablet 5 mg  5 mg Oral TID PRN Evelia Grimm DO   5 mg at 01/05/25 1645    Or    OLANZapine (zyPREXA) injection 5 mg  5 mg Intramuscular TID PRN Evelia Grimm DO        polyethylene glycol (MIRALAX) Packet 17 g  17 g Oral Daily PRN Nikki Caceres MD        senna-docusate (SENOKOT-S/PERICOLACE) 8.6-50 MG per tablet 1 tablet  1 tablet Oral Daily Evelia Grimm DO   1 tablet at 01/21/25 0849    traZODone (DESYREL) half-tab 25 mg  25 mg Oral At Bedtime Rocío Lopez MD   25 mg at 01/21/25 2028    traZODone (DESYREL) half-tab 25 mg  25 mg Oral At Bedtime PRN Evelia Grimm DO   25 mg at 12/24/24 0046     Labs and Imaging:  Data this admission:  - CBC unremarkable  - CMP unremarkable  - TSH normal  - UDS negative  - Vit D low  - Hgb A1c 5.9 (10/13/24)  - Lipids unremarkable  - Vit B12 normal  - Folate normal  - Urinalysis unremarkable  - EKG normal sinus rhythm, QTc 390 ms  - Head CT showed no acute changes  - HIV non reactive  - Treponema antibody non reactive  - ESR wnl   - Ceruloplasmin wnl   - BOOGIE negative  - Lyme negative      Parathyroid hormone:   85 (10/13)     Calcium:  11.4  "(10/14) -> 10.3 (10/16) -> 9.8 (10/19) -> 10.6 (10/21) -> 10.3 (10/23)     Albumin:  4.3 (10/14) -->  4.3 (10/16) -> 4.1 (10/17) -> 4.2 (10/19) -> 4.5 (10/21) -> 4.3 (10/23)      Mental Status Exam:   Oriented to:  Person/Self  General:  Awake and Alert  Appearance:  appears stated age, Grooming is adequate, Dressed in own clothing, and overweight  Behavior/Attitude:  Disengaged  Eye Contact: Avoids or Evasive  Psychomotor: Normal no catatonia present  Speech:  appropriate volume/tone  Language: Fluent in English with appropriate syntax and vocabulary.  Mood:  \"bummed\" about concerns over his dog  Affect:  appropriate and congruent with mood  Thought Process:  impoverished and confused  Thought Content:   No SI/HI/AH/VH; No apparent delusions  Associations:  loose  Insight:  impaired due to neurocognitive disorder   Judgment:  impaired due to neurocognitive disorder  Impulse control: impaired  Attention Span:  inattentive  Concentration:   impaired by neurocognitive disorder  Recent and Remote Memory:   remote memory intact, recent memory impaired  Fund of Knowledge: average  Muscle Strength and Tone: normal  Gait and Station: Normal     Psychiatric Assessment     Estevan Aaron is a 64 year old male with previous psychiatric diagnoses of GEORGINA admitted from the ER on 10/12/2024 due to concern for HI and psychosis in the context of medical issues (hyperthyroidism, hypercalcemia), psychosocial stressors including with recent divorce and selling his home. This is the patient's first psychiatric hospitalization. The MSE on admission was pertinent for a thought process which was perseverative, circumstantial, tangential, disorganized and tangential; with rambling and looseness of associations. Psychological contributions to presentation included lack of insight. Social factors contributing to presentation included isolation, recent divorce, selling his house, and moving from hotel to hotel. Biological contributions to " presentation included a history of hyperparathyroidism with chronic hypercalcemia per charts as well as a history of methamphetamine use per collateral from ex-wife.     Per collateral from ex-wife, Santos has had paranoid ideations since they first met. He has always felt like people are out to get him or trying to rip him off. She says that he has had visual hallucinations (he will point things out that the rest of the family can't see) for a long time, but the family would just go along with him to avoid making him angry. This timeline and presentation could be consistent with diagnosis of paranoid personality disorder. Things began to worsen around the beginning of the COVID pandemic, when Santos became more isolated and the family started to notice cognitive issues such as impaired memory. Other things that could be contributing to his presentation is hyperparathyroidism with hypercalcemia. However, patient's calcium returned to normal limits on 10/16, and patient continues to have disorganized behavior unrelated to calcium elevation, so likely this is not a significant contributing factor to patient's presentation.      Currently, Santos is at times disoriented, but easily re-directable. Presents with some difficulty of word articulation, which contributes to his frustration when interacting with psych team, as he finds it difficult to explain himself. He is able to take care of his ADLs without much assistance and he is mostly independent in the unit. On the other hand, his confused behavior tend to increase in the evenings, seemingly related to  Neurocognitive Disorder diagnosis, and this could evolved to be his new baseline. Either way, Santos does not present as a danger to himself or other so his SIO was discontinued. Santos does not present with any new episodes of physical agitation and is able to attend groups and interact with peers without major altercations. Patient currently is stable with current neuroleptic  dose, patient probably wont benefit from any modification in current therapy.          Psychiatric Plan by Diagnosis     Today's changes:  - No medication changes.     # Major Neurocognitive Disorder  1. Medications:  - Olanzapine 5 mg at bedtime     3. Additional Plans:  - Patient will be treated in therapeutic milieu with appropriate individual and group therapies as described  - Pending memory facility placement.      # Unspecified anxiety vs Generalized Anxiety Disorder  - Monitor for symptoms.  - Fluoxetine held due to suspicion of ongoing manic symptoms     Psychiatric Hospital Course:      Estevan Aaron was admitted to [facility] on a 72 hour hold.   Medications:  PTA fluoxetine was held due to concern for worsening of zelalem   New medications started at the time of admission include Zyprexa.   Increased olanzapine 10 mg at bedtime was to 10 mg BID (10/14)   Increased olanzapine 10 mg BID to 10 mg during day and 15 mg at bedtime (10/15)  10/21: increased olanzapine pm dose from 15 to 20 mg  10/23: started melatonin 3 mg at 7 pm to help patient with circadian rhythm as he has been staying up throughout the night and sleeping a lot during the day and there is some concern for delirium   10/24: consulted anesthesia for MRI brain   10/28: MRI brain complete, decrease AM olanzapine from 10 to 5 mg  10/29: Morning olanzapine decreased to 2.5 mg, evening olanzapine decreased to 15 mg   11/1: Decreased evening olanzapine to 10 mg   11/4: Decreased evening olanzapine to 7.5 mg   11/5: Decreased evening olanzapine to 5 mg   11/11: Moved morning dose of olanzapine to the afternoon as patient appears more agitated in the afternoons/evenings  11/21: Started memantine 5 mg daily   11/26: Discontinued olanzapine 2.5 mg during the afternoon  12/2:   Discontinued memantine 5 mg, daily    The risks, benefits, alternatives, and side effects were discussed and understood by the patient and other caregivers.     Medical Assessment  and Plan     Medical diagnoses to be addressed this admission:    # Hip Pain  - New right hip pain, and mild pain in multiple joints. Moderate response to topical antiinflammatory gel and lidocaine patch.   - Medicine consulted for recommendations 12/20    # CHF  # Hypertension  - Continue PTA medications  Furosemide 40 mg daily  Lisinopril 40 mg daily  Metoprolol 50 mg daily  Amlodipine 10 mg daily  Clonidine 0.1 mg BID  - Pitting edema in lower extremities, not painful 3+: Medicine consulted for recommendations 12/20  - Weight gain of about 8 pounds in about 2 months currently 249lb as of 01/07/25    # Hyperlipidemia  - Continue PTA Atorvastatin 40 mg     # Primary Hyperparathyroidism  # Hypercalcemia, hypophosphoremia   Increased Ca level to 10.9 and decreased Ph level to 2.3 in the ED   - Consulted endocrinology, who started cinacalcet 30 mg BID on 10/13  - 10/16 endocrinology recommended continuing to trend calcium and albumin to make sure patient does not become hypocalcemic and recommended holding cinacalcet   - 10/17, calcium and albumin wnl, endo recommended cinacalcet 30 mg once daily   - 10/21, per endo continue cincaclcet and recheck calcium and albumin on October 24  - 10/23: per endo, patient needs to follow up outpatient for further management of hyperparathyroidism     # Rhinorrhea  1/15 currently multiple COVID infections on unit. No other symptoms of COVID noted. COVID PCR - on 1/13.    Medical course: Patient was physically examined by the ED prior to being transferred to the unit and was found to be medically stable and appropriate for admission.      Consults: Psychiatry, Endocrinology (follow-up of hyperthyroidism / hypercalcemia and hypertension), neurology     Checklist     Legal Status: Committed   [facility] meds: Haldol, Clozaril, Risperdal, Invega, Zyprexa, Seroquel, Abilify    Safety Assessment:   Behavioral Orders   Procedures    Code 1 - Restrict to Unit    Routine Programming     As  clinically indicated    Status 15     Every 15 minutes.    Status Individual Observation     Patient SIO status reviewed with team/RN.  Please also refer to RN/team documentation for add'l detail.    -SIO staff to monitor following which have contributed to patient being on SIO:  Patient intrusiveness to other patients  -Possible interventions SIO staff could use to support patient's treatment progress:  Redirection  -When following observed, team will review discontinuation of SIO:  Improvement in intrusive behavior.     Order Specific Question:   CONTINUOUS 24 hours / day     Answer:   Other     Order Specific Question:   Specify distance     Answer:   10 feet     Order Specific Question:   Indications for SIO     Answer:   Severe intrusiveness       Risk Assessment:  Risk for harm is low  Risk factors: impulsive and past behaviors  Protective factors: family      SIO: No    Disposition: Pending assessment for memory care facility.       Attestations     This patient was seen and discussed with my attending physician.  Evelia Grimm, DO  PGY-1 Psychiatry Resident

## 2025-01-22 NOTE — PLAN OF CARE
Problem: Adult Behavioral Health Plan of Care  Goal: Adheres to Safety Considerations for Self and Others  Outcome: Progressing  Goal: Optimized Coping Skills in Response to Life Stressors  Outcome: Progressing     Problem: Psychotic Signs/Symptoms  Goal: Improved Behavioral Control (Psychotic Signs/Symptoms)  Outcome: Progressing     Problem: Suicidal Behavior  Goal: Suicidal Behavior is Absent or Managed  Outcome: Progressing     Problem: Sleep Disturbance  Goal: Adequate Sleep/Rest  Outcome: Progressing     Problem: Manic or Hypomanic Signs/Symptoms  Goal: Improved Impulse Control (Manic/Hypomanic Signs/Symptoms)  Outcome: Progressing   Goal Outcome Evaluation:    Plan of Care Reviewed With: patient      Pt woke up late this shift at about 9 AM. He ate breakfast well. He is compliant with medications. He still appears sleepy and was observed sleeping on his chair. He is minimally interactive with peers. All interactions were appropriate. He denies anxiety, SI and hallucinations. Endorsed depression. Pt took a nap about lunch time. When he came out, he requested for  fresh water. Pt did not like to drink from the machine. Pt given his bottled water.  He was offered his late lunch and he accepted it.  He appeared pre occupied with his phone numbers and his paper work in his room.

## 2025-01-22 NOTE — PLAN OF CARE
Team Note Due:  Monday    Assessment/Intervention/Current Symtoms and Care Coordination:  Chart review and met with team, discussed pt progress, symptomology, and response to treatment.  Discussed the discharge plan and any potential impediments to discharge. Clifford Aaron was emergency guardian/conservator until 01/20/2025. A petition for permanent guardianship/conservatorship has been filed.    Waiting for update from [facility] with final approval.    Discharge Plan or Goal:  Memory care facility     Barriers to Discharge:  Patient requires further psychiatric stabilization due to current symptomology, medication management with changes subject to provider, coordination with outside supports, and aftercare planning. Pt is under civil commitment.     Referral Status:    Memory Care facilities currently in process:  [facility]. Tour completed 11/27.  [facility]. Tour completed 11/30. Per Clifford on 1/8, she would like to move forward with a referral. RN assessment completed 1/20 (RN approved pt). Awaiting approval from corporate.  Vera (Exec Director): [phone]  [facility]. Tour scheduled 1/8/25. Have immediate openings and will accept EW. Family not requesting referral yet.  [facility]. Per Clifford on 1/20, she would like to move forward with a referral. Records sent 1/20.  [email or web address]    Memory Care facilities pending family review:  [facility] [place].  [facility].  [facility].  [facility].  [facility].    Memory Care facilities declined:  [facility] (private pay only, no EW).  [facility] (private pay only, no EW).  [facility] (no openings).  [facility]. Tour completed 11/29. Records sent 12/2/24. Declined 12/19/24 (don't feel they are an appropriate  facility for pt's needs).  Lor[place] (): [email or web address]; [phone]  Isatu (director of nursing): [email or web address]  [facility].  Denied 12/26 (concerned about dementia with psychosis, history of hallucinations, high elopement risk, still on 1:1).  Nikki Noel: [email or web address]; Office [phone], Fax [phone]   [facility]. Not accepting EW as of 1/7/25.    Legal Status:  MI Commitment with [facility]  File Number: [id number]  Start and expiration date of commitment: 10/24/24 - 04/24/25    [facility] meds: Haldol, Clozaril, Risperdal, Invega, Zyprexa, Seroquel, Abilify    PPS/CM:  Shelby Chowdary: [phone]  [email or web address].    Contacts:  Maria C Aaron (Daughter): [phone]   Clifford Aaron (ex-wife): [phone]     No Del Angel (guardianship/conservatorship ): [phone]  [email or web address]    Derrell Duff ([employer] ): [phone]  [email or web address]     Upcoming Meetings and Dates/Important Information and next steps:  Follow up with family regarding list of Memory Care facilities  Discharge planning when appropriate  Pt does not qualify for MA per financial counselor on 11/8/2024. Pt will likely privately pay for Memory Care until he qualifies for MA/waivers in the future.    Provisional Discharge and Change of Status needed at discharge

## 2025-01-23 VITALS
TEMPERATURE: 98.2 F | SYSTOLIC BLOOD PRESSURE: 137 MMHG | BODY MASS INDEX: 40.85 KG/M2 | OXYGEN SATURATION: 99 % | HEART RATE: 76 BPM | RESPIRATION RATE: 18 BRPM | WEIGHT: 253.1 LBS | DIASTOLIC BLOOD PRESSURE: 74 MMHG

## 2025-01-23 PROCEDURE — 124N000002 HC R&B MH UMMC

## 2025-01-23 PROCEDURE — 250N000013 HC RX MED GY IP 250 OP 250 PS 637

## 2025-01-23 PROCEDURE — 90853 GROUP PSYCHOTHERAPY: CPT

## 2025-01-23 PROCEDURE — 99231 SBSQ HOSP IP/OBS SF/LOW 25: CPT | Mod: GC | Performed by: PSYCHIATRY & NEUROLOGY

## 2025-01-23 RX ADMIN — METOPROLOL SUCCINATE 50 MG: 50 TABLET, EXTENDED RELEASE ORAL at 08:13

## 2025-01-23 RX ADMIN — CINACALCET 30 MG: 30 TABLET ORAL at 08:13

## 2025-01-23 RX ADMIN — ATORVASTATIN CALCIUM 40 MG: 20 TABLET, FILM COATED ORAL at 08:12

## 2025-01-23 RX ADMIN — CLONIDINE HYDROCHLORIDE 0.1 MG: 0.1 TABLET ORAL at 21:16

## 2025-01-23 RX ADMIN — FUROSEMIDE 40 MG: 40 TABLET ORAL at 08:14

## 2025-01-23 RX ADMIN — AMLODIPINE BESYLATE 10 MG: 5 TABLET ORAL at 08:13

## 2025-01-23 RX ADMIN — Medication 25 MG: at 21:16

## 2025-01-23 RX ADMIN — OLANZAPINE 5 MG: 5 TABLET, ORALLY DISINTEGRATING ORAL at 21:17

## 2025-01-23 RX ADMIN — LISINOPRIL 40 MG: 10 TABLET ORAL at 08:14

## 2025-01-23 RX ADMIN — CLONIDINE HYDROCHLORIDE 0.1 MG: 0.1 TABLET ORAL at 08:16

## 2025-01-23 RX ADMIN — MELATONIN TAB 3 MG 3 MG: 3 TAB at 21:16

## 2025-01-23 RX ADMIN — SENNOSIDES AND DOCUSATE SODIUM 1 TABLET: 50; 8.6 TABLET ORAL at 08:13

## 2025-01-23 ASSESSMENT — ACTIVITIES OF DAILY LIVING (ADL)
ADLS_ACUITY_SCORE: 66
DRESS: STREET CLOTHES;INDEPENDENT
ADLS_ACUITY_SCORE: 66
ORAL_HYGIENE: INDEPENDENT
ADLS_ACUITY_SCORE: 66
HYGIENE/GROOMING: INDEPENDENT
ADLS_ACUITY_SCORE: 66
LAUNDRY: WITH SUPERVISION
ADLS_ACUITY_SCORE: 66
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
## 2025-01-23 NOTE — PLAN OF CARE
Problem: Psychotic Signs/Symptoms  Goal: Improved Behavioral Control (Psychotic Signs/Symptoms)  Outcome: Progressing     Problem: Sleep Disturbance  Goal: Adequate Sleep/Rest  Outcome: Progressing     Problem: Manic or Hypomanic Signs/Symptoms  Goal: Improved Impulse Control (Manic/Hypomanic Signs/Symptoms)  Outcome: Progressing   Goal Outcome Evaluation:    Pt was visible in the lounge watching TV with peers dosing on and off. Affect flat but pleasant on approach. Mood is restless, agitated and angry. Pt was intrusive, he was in and out of other pt room. Pt became agitated and hostile towards staff when they tried to redirect him.  Noted +3 petting edema  on both wrist and feet. Notified resident.. No shortness of breath noted. VSS. Pt refused to wear his compression socks this shift. Pt was compliant with medications. No safety concerns. Behavioral outburst noted.

## 2025-01-23 NOTE — PLAN OF CARE
Group Attendance:  attended full group    Time session began: 1315  Time session ended: 1400  Patient's total time in group: 38    Total # Attendees   6   Group Type psychotherapeutic     Group Topic Covered Substance use disorder abstinence, safety, personal care, and developmental health agenda     Group Session Detail In this group, the participants reviewed the external factors that could impact mental health functioning. Focus was given to substance use, poor sleep, and environmental situations. Patients asked questions related to their own experiences and how they could cope/adapt/change their behaviors in order to guarantee healthy outcomes. Feedback was given to each participant using motivational approaches. Each participant identified a plan of action for follow through on prevention and self-care.     Patient's response to the group topic/interactions:  cooperative with task     Patient Details: Pt attended and participated           [id number] - Group psychotherapy - 1 Session    Patient Active Problem List   Diagnosis    Hyperlipidemia LDL goal <130    Hypertension goal BP (blood pressure) < 130/80    Hypercalcemia    Hyperparathyroidism    Thalassemia, unspecified type    Psychosis, unspecified psychosis type (H)    Acute psychosis (H)

## 2025-01-23 NOTE — PROGRESS NOTES
"  ----------------------------------------------------------------------------------------------------------  [facility]  Psychiatry Progress Note  Hospital Day #103     Interim History:     The patient's care was discussed with the treatment team and chart notes were reviewed.    Identifier: Estevan Aaron is a 64 year old male with previous psychiatric diagnoses of  generalized anxiety disorder, Neurocognitive disorder, admitted from the ED 10/12/2024 due to concern for HI and psychosis in the context of medical issues (hyperthyroidism, hypercalcemia) psychosocial stressors including family dynamics with recent possible divorce.    Sleep: 3.75 hours (01/23/25 0600)  Scheduled medications: Took all scheduled medications as prescribed  Psychiatric PRN medications: none     Staff Report:   Patient visibile in the milieu, often falling asleep in the milieu during the day. Denied all mental health symptoms.      Subjective:     Patient Interview:  Santos was interviewed in the milieu. Says that he's doing well. When asked about his dog (which he was distressed about yesterday), says he wasn't able to find the dog... \"in his head\". Notes that he has no other questions or concerns at this time. When asked about his kids' and wife's names, he takes a few seconds to think about them but eventually able to recall their names.     ROS:  See above     Objective:     Vitals:  /65   Pulse 66   Temp 97.2  F (36.2  C)   Resp 18   Wt 114.6 kg (252 lb 9.6 oz)   SpO2 96%   BMI 40.77 kg/m      Allergies:  No Known Allergies    Current Medications:  Scheduled:  Current Facility-Administered Medications   Medication Dose Route Frequency Provider Last Rate Last Admin    acetaminophen (TYLENOL) tablet 650 mg  650 mg Oral Q4H PRN Nikki Caceres MD   650 mg at 01/09/25 0228    alum & mag hydroxide-simethicone (MAALOX) suspension 30 mL  30 mL Oral Q4H PRN Nikki Caceres MD   30 " mL at 10/17/24 0837    amLODIPine (NORVASC) tablet 10 mg  10 mg Oral Daily Presley Barrera MD   10 mg at 01/23/25 0813    atorvastatin (LIPITOR) tablet 40 mg  40 mg Oral Daily Nikki Caceres MD   40 mg at 01/23/25 0812    cholecalciferol (VITAMIN D3) capsule 1,250 mcg  1,250 mcg Oral Q7 Days Evelia Grimm DO   1,250 mcg at 01/21/25 1059    cinacalcet (SENSIPAR) tablet 30 mg  30 mg Oral Daily Presley Barrera MD   30 mg at 01/23/25 0813    cloNIDine (CATAPRES) tablet 0.1 mg  0.1 mg Oral BID Presley Barrera MD   0.1 mg at 01/23/25 0816    diclofenac (VOLTAREN) 1 % topical gel 2 g  2 g Topical 4x Daily PRN Rocío Lopez MD   2 g at 12/22/24 2032    furosemide (LASIX) tablet 40 mg  40 mg Oral Daily Presley Barrera MD   40 mg at 01/23/25 0814    gabapentin (NEURONTIN) capsule 100 mg  100 mg Oral Q6H PRN Nikki Caceres MD   100 mg at 12/24/24 0046    hydrocortisone (CORTAID) 0.5 % cream   Topical Daily PRN Savi Chamorro MD        Lidocaine (LIDOCARE) 4 % Patch 1 patch  1 patch Transdermal Q24H PRN Rocío Lopez MD   1 patch at 12/22/24 2023    lisinopril (ZESTRIL) tablet 40 mg  40 mg Oral Daily Presley Barrera MD   40 mg at 01/23/25 0814    melatonin tablet 3 mg  3 mg Oral At Bedtime Presley Barrera MD   3 mg at 01/22/25 2042    metoprolol succinate ER (TOPROL XL) 24 hr tablet 50 mg  50 mg Oral Daily Presley Barrera MD   50 mg at 01/23/25 0813    nicotine (NICODERM CQ) 14 MG/24HR 24 hr patch 1 patch  1 patch Transdermal Daily Evelia Grimm DO   1 patch at 01/22/25 0901    nicotine (NICORETTE) gum 2 mg  2 mg Buccal Q1H PRN González Garcia MD   2 mg at 10/24/24 1254    OLANZapine zydis (zyPREXA) ODT tab 5 mg  5 mg Oral At Bedtime González Garcia MD   5 mg at 01/22/25 2043    Or    OLANZapine (zyPREXA) injection 10 mg  10 mg Intramuscular At Bedtime González Garcia MD   10 mg at 01/12/25 2211    OLANZapine (zyPREXA) tablet 5 mg  5 mg Oral TID PRN Evelia Grimm,  DO   5 mg at 01/05/25 1645    Or    OLANZapine (zyPREXA) injection 5 mg  5 mg Intramuscular TID PRN Evelia Grimm DO        polyethylene glycol (MIRALAX) Packet 17 g  17 g Oral Daily PRN Nikki Caceres MD        senna-docusate (SENOKOT-S/PERICOLACE) 8.6-50 MG per tablet 1 tablet  1 tablet Oral Daily Evelia Grimm DO   1 tablet at 01/23/25 0813    traZODone (DESYREL) half-tab 25 mg  25 mg Oral At Bedtime Rocío Lopez MD   25 mg at 01/22/25 2043    traZODone (DESYREL) half-tab 25 mg  25 mg Oral At Bedtime PRN Evelia Grimm DO   25 mg at 12/24/24 0046       PRN:  Current Facility-Administered Medications   Medication Dose Route Frequency Provider Last Rate Last Admin    acetaminophen (TYLENOL) tablet 650 mg  650 mg Oral Q4H PRN Nikki Caceres MD   650 mg at 01/09/25 0228    alum & mag hydroxide-simethicone (MAALOX) suspension 30 mL  30 mL Oral Q4H PRN Nikki Caceres MD   30 mL at 10/17/24 0837    amLODIPine (NORVASC) tablet 10 mg  10 mg Oral Daily Presley Barrera MD   10 mg at 01/23/25 0813    atorvastatin (LIPITOR) tablet 40 mg  40 mg Oral Daily Nikki Caceres MD   40 mg at 01/23/25 0812    cholecalciferol (VITAMIN D3) capsule 1,250 mcg  1,250 mcg Oral Q7 Days Evelia Grimm DO   1,250 mcg at 01/21/25 1059    cinacalcet (SENSIPAR) tablet 30 mg  30 mg Oral Daily Presley Barrera MD   30 mg at 01/23/25 0813    cloNIDine (CATAPRES) tablet 0.1 mg  0.1 mg Oral BID Presley Barrera MD   0.1 mg at 01/23/25 0816    diclofenac (VOLTAREN) 1 % topical gel 2 g  2 g Topical 4x Daily PRN Rocío Lopez MD   2 g at 12/22/24 2032    furosemide (LASIX) tablet 40 mg  40 mg Oral Daily Presley Barrera MD   40 mg at 01/23/25 0814    gabapentin (NEURONTIN) capsule 100 mg  100 mg Oral Q6H PRN Nikki Caceres MD   100 mg at 12/24/24 0046    hydrocortisone (CORTAID) 0.5 % cream   Topical Daily PRN Savi Chamorro MD        Lidocaine (LIDOCARE) 4 % Patch 1 patch  1 patch  Transdermal Q24H PRN Rocío Lopez MD   1 patch at 12/22/24 2023    lisinopril (ZESTRIL) tablet 40 mg  40 mg Oral Daily Presley Barrera MD   40 mg at 01/23/25 0814    melatonin tablet 3 mg  3 mg Oral At Bedtime Presley Barrera MD   3 mg at 01/22/25 2042    metoprolol succinate ER (TOPROL XL) 24 hr tablet 50 mg  50 mg Oral Daily Presley Barrera MD   50 mg at 01/23/25 0813    nicotine (NICODERM CQ) 14 MG/24HR 24 hr patch 1 patch  1 patch Transdermal Daily Evelia Grimm DO   1 patch at 01/22/25 0901    nicotine (NICORETTE) gum 2 mg  2 mg Buccal Q1H PRN González Garcia MD   2 mg at 10/24/24 1254    OLANZapine zydis (zyPREXA) ODT tab 5 mg  5 mg Oral At Bedtime González Garcia MD   5 mg at 01/22/25 2043    Or    OLANZapine (zyPREXA) injection 10 mg  10 mg Intramuscular At Bedtime González Garcia MD   10 mg at 01/12/25 2211    OLANZapine (zyPREXA) tablet 5 mg  5 mg Oral TID PRN Evelia Grimm DO   5 mg at 01/05/25 1645    Or    OLANZapine (zyPREXA) injection 5 mg  5 mg Intramuscular TID PRN Evelia Grimm DO        polyethylene glycol (MIRALAX) Packet 17 g  17 g Oral Daily PRN Nikki Caceres MD        senna-docusate (SENOKOT-S/PERICOLACE) 8.6-50 MG per tablet 1 tablet  1 tablet Oral Daily Evelia Grimm DO   1 tablet at 01/23/25 0813    traZODone (DESYREL) half-tab 25 mg  25 mg Oral At Bedtime Rocío Lopez MD   25 mg at 01/22/25 2043    traZODone (DESYREL) half-tab 25 mg  25 mg Oral At Bedtime PRN Evelia Grimm DO   25 mg at 12/24/24 0046     Labs and Imaging:  Data this admission:  - CBC unremarkable  - CMP unremarkable  - TSH normal  - UDS negative  - Vit D low  - Hgb A1c 5.9 (10/13/24)  - Lipids unremarkable  - Vit B12 normal  - Folate normal  - Urinalysis unremarkable  - EKG normal sinus rhythm, QTc 390 ms  - Head CT showed no acute changes  - HIV non reactive  - Treponema antibody non reactive  - ESR wnl   - Ceruloplasmin wnl   - BOOGIE negative  - Lyme negative     "  Parathyroid hormone:   85 (10/13)     Calcium:  11.4 (10/14) -> 10.3 (10/16) -> 9.8 (10/19) -> 10.6 (10/21) -> 10.3 (10/23)     Albumin:  4.3 (10/14) -->  4.3 (10/16) -> 4.1 (10/17) -> 4.2 (10/19) -> 4.5 (10/21) -> 4.3 (10/23)      Mental Status Exam:   Oriented to:  Person/Self  General:  Awake and Alert  Appearance:  appears stated age, Grooming is adequate, Dressed in own clothing, and overweight  Behavior/Attitude:  Calm, Cooperative, and Easily distracted  Eye Contact: Appropriate  Psychomotor: Normal and No evidence of tics, dystonia, or tardive dyskinesia  no catatonia present  Speech:  appropriate volume/tone  Language: Fluent in English with appropriate syntax and vocabulary.  Mood:  \"good\"   Affect:  appropriate and congruent with mood  Thought Process:  linear and coherent  Thought Content:   No SI/HI/AH/VH; No apparent delusions  Associations:  loose  Insight:  impaired due to neurocognitive disorder   Judgment:  impaired due to neurocognitive disorder  Impulse control: impaired  Attention Span:  inattentive  Concentration:   impaired by neurocognitive disorder  Recent and Remote Memory:   remote memory intact, recent memory impaired  Fund of Knowledge: average  Muscle Strength and Tone: normal  Gait and Station: Normal     Psychiatric Assessment     Estevan Aaron is a 64 year old male with previous psychiatric diagnoses of GEORGINA admitted from the ER on 10/12/2024 due to concern for HI and psychosis in the context of medical issues (hyperthyroidism, hypercalcemia), psychosocial stressors including with recent divorce and selling his home. This is the patient's first psychiatric hospitalization. The MSE on admission was pertinent for a thought process which was perseverative, circumstantial, tangential, disorganized and tangential; with rambling and looseness of associations. Psychological contributions to presentation included lack of insight. Social factors contributing to presentation included " isolation, recent divorce, selling his house, and moving from hotel to hotel. Biological contributions to presentation included a history of hyperparathyroidism with chronic hypercalcemia per charts as well as a history of methamphetamine use per collateral from ex-wife.     Per collateral from ex-wife, Santos has had paranoid ideations since they first met. He has always felt like people are out to get him or trying to rip him off. She says that he has had visual hallucinations (he will point things out that the rest of the family can't see) for a long time, but the family would just go along with him to avoid making him angry. This timeline and presentation could be consistent with diagnosis of paranoid personality disorder. Things began to worsen around the beginning of the COVID pandemic, when Santos became more isolated and the family started to notice cognitive issues such as impaired memory. Other things that could be contributing to his presentation is hyperparathyroidism with hypercalcemia. However, patient's calcium returned to normal limits on 10/16, and patient continues to have disorganized behavior unrelated to calcium elevation, so likely this is not a significant contributing factor to patient's presentation.      Currently, Santos is at times disoriented, but easily re-directable. Presents with some difficulty of word articulation, which contributes to his frustration when interacting with psych team, as he finds it difficult to explain himself. He is able to take care of his ADLs without much assistance and he is mostly independent in the unit. On the other hand, his confused behavior tend to increase in the evenings, seemingly related to  Neurocognitive Disorder diagnosis, and this could evolved to be his new baseline. Either way, Santos does not present as a danger to himself or other so his SIO was discontinued. Santos does not present with any new episodes of physical agitation and is able to attend groups  and interact with peers without major altercations. Patient currently is stable with current neuroleptic dose, patient probably wont benefit from any modification in current therapy.          Psychiatric Plan by Diagnosis     Today's changes:  - No medication changes.     # Major Neurocognitive Disorder  1. Medications:  - Olanzapine 5 mg at bedtime     3. Additional Plans:  - Patient will be treated in therapeutic milieu with appropriate individual and group therapies as described  - Pending memory facility placement.      # Unspecified anxiety vs Generalized Anxiety Disorder  - Monitor for symptoms.  - Fluoxetine held due to suspicion of ongoing manic symptoms     Psychiatric Hospital Course:      Estevan Aaron was admitted to [facility] on a 72 hour hold.   Medications:  PTA fluoxetine was held due to concern for worsening of zelalem   New medications started at the time of admission include Zyprexa.   Increased olanzapine 10 mg at bedtime was to 10 mg BID (10/14)   Increased olanzapine 10 mg BID to 10 mg during day and 15 mg at bedtime (10/15)  10/21: increased olanzapine pm dose from 15 to 20 mg  10/23: started melatonin 3 mg at 7 pm to help patient with circadian rhythm as he has been staying up throughout the night and sleeping a lot during the day and there is some concern for delirium   10/24: consulted anesthesia for MRI brain   10/28: MRI brain complete, decrease AM olanzapine from 10 to 5 mg  10/29: Morning olanzapine decreased to 2.5 mg, evening olanzapine decreased to 15 mg   11/1: Decreased evening olanzapine to 10 mg   11/4: Decreased evening olanzapine to 7.5 mg   11/5: Decreased evening olanzapine to 5 mg   11/11: Moved morning dose of olanzapine to the afternoon as patient appears more agitated in the afternoons/evenings  11/21: Started memantine 5 mg daily   11/26: Discontinued olanzapine 2.5 mg during the afternoon  12/2:   Discontinued memantine 5 mg, daily    The risks, benefits, alternatives,  and side effects were discussed and understood by the patient and other caregivers.     Medical Assessment and Plan     Medical diagnoses to be addressed this admission:    # Hip Pain  - New right hip pain, and mild pain in multiple joints. Moderate response to topical antiinflammatory gel and lidocaine patch.   - Medicine consulted for recommendations 12/20    # CHF  # Hypertension  - Continue PTA medications  Furosemide 40 mg daily  Lisinopril 40 mg daily  Metoprolol 50 mg daily  Amlodipine 10 mg daily  Clonidine 0.1 mg BID  - Pitting edema in lower extremities, not painful 3+: Medicine consulted for recommendations 12/20  - Weight gain of about 8 pounds in about 2 months currently 249lb as of 01/07/25    # Hyperlipidemia  - Continue PTA Atorvastatin 40 mg     # Primary Hyperparathyroidism  # Hypercalcemia, hypophosphoremia   Increased Ca level to 10.9 and decreased Ph level to 2.3 in the ED   - Consulted endocrinology, who started cinacalcet 30 mg BID on 10/13  - 10/16 endocrinology recommended continuing to trend calcium and albumin to make sure patient does not become hypocalcemic and recommended holding cinacalcet   - 10/17, calcium and albumin wnl, endo recommended cinacalcet 30 mg once daily   - 10/21, per endo continue cincaclcet and recheck calcium and albumin on October 24  - 10/23: per endo, patient needs to follow up outpatient for further management of hyperparathyroidism     # Rhinorrhea  1/15 currently multiple COVID infections on unit. No other symptoms of COVID noted. COVID PCR - on 1/13.    Medical course: Patient was physically examined by the ED prior to being transferred to the unit and was found to be medically stable and appropriate for admission.      Consults: Psychiatry, Endocrinology (follow-up of hyperthyroidism / hypercalcemia and hypertension), neurology     Checklist     Legal Status: Committed   [facility] meds: Haldol, Clozaril, Risperdal, Invega, Zyprexa, Seroquel, Abilify    Safety  Assessment:   Behavioral Orders   Procedures    Code 1 - Restrict to Unit    Routine Programming     As clinically indicated    Status 15     Every 15 minutes.    Status Individual Observation     Patient SIO status reviewed with team/RN.  Please also refer to RN/team documentation for add'l detail.    -SIO staff to monitor following which have contributed to patient being on SIO:  Patient intrusiveness to other patients  -Possible interventions SIO staff could use to support patient's treatment progress:  Redirection  -When following observed, team will review discontinuation of SIO:  Improvement in intrusive behavior.     Order Specific Question:   CONTINUOUS 24 hours / day     Answer:   Other     Order Specific Question:   Specify distance     Answer:   10 feet     Order Specific Question:   Indications for SIO     Answer:   Severe intrusiveness       Risk Assessment:  Risk for harm is low  Risk factors: impulsive and past behaviors  Protective factors: family      SIO: No    Disposition: Pending assessment for memory care facility.       Attestations     This patient was seen and discussed with my attending physician.  Evelia Grimm, DO  PGY-1 Psychiatry Resident

## 2025-01-23 NOTE — PLAN OF CARE
Shift Summary ([id number])  Mental Health Status   Pt is alert and oriented to his name. Forgetful. Able to communicate needs. Mood is labile with blunted affect. No signs of distress noted. Insight and judgment remains impaired.   Pt denies SI, SIB, AH, VH, anxiety, depression, racing or intrusive thoughts.   Pt was visible in lounge social. Participated in therapeutic group activity.    Pt took scheduled medication ok with no problem except for Nicotine patch. Tolerated medications well. No side effect reported or observed.  No acute events or safety concern this shift.   Prn given: None   Physical Health Status   Moves around independently with no difficulties. Remains on medical bed due to obesity.   Hygiene is fair.   Appetite and fluid intake are adequate. Ate both breakfast and lunch.   Reported no problem with bowel and bladder.   Slept 3.75 hours last night per staff's report.   No c/o pain or discomfort.   Continues with edema on Bilateral lower extremities. Pt was helped to put compression stocking on ,but after one hour came off per pt's request.   Today's Lab orders: None   Sitting /76 & pulse 60  Standing BP & pulse- Not checked.  Prn given: None   Continue to monitor pt's status Q 15 minutes and anticipate needs due to being forgetful.

## 2025-01-23 NOTE — PLAN OF CARE
Problem: Sleep Disturbance  Goal: Adequate Sleep/Rest  Outcome: Progressing   Goal Outcome Evaluation:    Pt was visible in the lounge area intermittently this shift. He had an uneventful night. Pt denies pain and discomfort. He slept for about 3.75 hours.     Blood pressure 127/65, pulse 66, temperature 97.2  F (36.2  C), resp. rate 18, weight 114.6 kg (252 lb 9.6 oz), SpO2 96%.

## 2025-01-23 NOTE — PLAN OF CARE
BEH IP Unit Acuity Rating Score (UARS)  Patient is given one point for every criteria they meet.    CRITERIA SCORING   On a 72 hour hold, court hold, committed, stay of commitment, or revocation. 1    Patient LOS on BEH unit exceeds 20 days. 1  LOS: 103   Patient under guardianship, 55+, otherwise medically complex, or under age 11. 1   Suicide ideation without relief of precipitating factors. 0   Current plan for suicide. 0   Current plan for homicide. 0   Imminent risk or actual attempt to seriously harm another without relief of factors precipitating the attempt. 1   Severe dysfunction in daily living (ex: complete neglect for self care, extreme disruption in vegetative function, extreme deterioration in social interactions). 1   Recent (last 7 days) or current physical aggression in the ED or on unit. 0   Restraints or seclusion episode in past 72 hours. 0   Recent (last 7 days) or current verbal aggression, agitation, yelling, etc., while in the ED or unit. 0   Active psychosis. 1   Need for constant or near constant redirection (from leaving, from others, etc).  0   Intrusive or disruptive behaviors. 1   Patient requires 3 or more hours of individualized nursing care per 8-hour shift (i.e. for ADLs, meds, therapeutic interventions). 1   TOTAL 8

## 2025-01-24 LAB
ALBUMIN SERPL BCG-MCNC: 4.5 G/DL (ref 3.5–5.2)
ALP SERPL-CCNC: 122 U/L (ref 40–150)
ALT SERPL W P-5'-P-CCNC: 37 U/L (ref 0–70)
ANION GAP SERPL CALCULATED.3IONS-SCNC: 9 MMOL/L (ref 7–15)
AST SERPL W P-5'-P-CCNC: 34 U/L (ref 0–45)
BASOPHILS # BLD AUTO: 0.1 10E3/UL (ref 0–0.2)
BASOPHILS NFR BLD AUTO: 1 %
BILIRUB SERPL-MCNC: 0.6 MG/DL
BUN SERPL-MCNC: 18.1 MG/DL (ref 8–23)
CALCIUM SERPL-MCNC: 10.9 MG/DL (ref 8.8–10.4)
CHLORIDE SERPL-SCNC: 102 MMOL/L (ref 98–107)
CREAT SERPL-MCNC: 0.84 MG/DL (ref 0.67–1.17)
EGFRCR SERPLBLD CKD-EPI 2021: >90 ML/MIN/1.73M2
EOSINOPHIL # BLD AUTO: 0.2 10E3/UL (ref 0–0.7)
EOSINOPHIL NFR BLD AUTO: 3 %
ERYTHROCYTE [DISTWIDTH] IN BLOOD BY AUTOMATED COUNT: 18.7 % (ref 10–15)
GLUCOSE SERPL-MCNC: 113 MG/DL (ref 70–99)
HCO3 SERPL-SCNC: 29 MMOL/L (ref 22–29)
HCT VFR BLD AUTO: 42.5 % (ref 40–53)
HGB BLD-MCNC: 13.5 G/DL (ref 13.3–17.7)
HOLD SPECIMEN: NORMAL
IMM GRANULOCYTES # BLD: 0.1 10E3/UL
IMM GRANULOCYTES NFR BLD: 1 %
LYMPHOCYTES # BLD AUTO: 1.8 10E3/UL (ref 0.8–5.3)
LYMPHOCYTES NFR BLD AUTO: 25 %
MAGNESIUM SERPL-MCNC: 1.9 MG/DL (ref 1.7–2.3)
MCH RBC QN AUTO: 20.6 PG (ref 26.5–33)
MCHC RBC AUTO-ENTMCNC: 31.8 G/DL (ref 31.5–36.5)
MCV RBC AUTO: 65 FL (ref 78–100)
MONOCYTES # BLD AUTO: 0.6 10E3/UL (ref 0–1.3)
MONOCYTES NFR BLD AUTO: 9 %
NEUTROPHILS # BLD AUTO: 4.4 10E3/UL (ref 1.6–8.3)
NEUTROPHILS NFR BLD AUTO: 61 %
NRBC # BLD AUTO: 0 10E3/UL
NRBC BLD AUTO-RTO: 0 /100
NT-PROBNP SERPL-MCNC: 39 PG/ML (ref 0–900)
PHOSPHATE SERPL-MCNC: 2.6 MG/DL (ref 2.5–4.5)
PLATELET # BLD AUTO: 241 10E3/UL (ref 150–450)
POTASSIUM SERPL-SCNC: 4.3 MMOL/L (ref 3.4–5.3)
PROT SERPL-MCNC: 7.3 G/DL (ref 6.4–8.3)
RBC # BLD AUTO: 6.55 10E6/UL (ref 4.4–5.9)
SODIUM SERPL-SCNC: 140 MMOL/L (ref 135–145)
TROPONIN T SERPL HS-MCNC: 13 NG/L
TROPONIN T SERPL HS-MCNC: 13 NG/L
WBC # BLD AUTO: 7.3 10E3/UL (ref 4–11)

## 2025-01-24 PROCEDURE — 99231 SBSQ HOSP IP/OBS SF/LOW 25: CPT | Mod: GC | Performed by: PSYCHIATRY & NEUROLOGY

## 2025-01-24 PROCEDURE — 83880 ASSAY OF NATRIURETIC PEPTIDE: CPT

## 2025-01-24 PROCEDURE — 250N000013 HC RX MED GY IP 250 OP 250 PS 637

## 2025-01-24 PROCEDURE — 124N000002 HC R&B MH UMMC

## 2025-01-24 PROCEDURE — 83735 ASSAY OF MAGNESIUM: CPT

## 2025-01-24 PROCEDURE — 85025 COMPLETE CBC W/AUTO DIFF WBC: CPT

## 2025-01-24 PROCEDURE — 36415 COLL VENOUS BLD VENIPUNCTURE: CPT

## 2025-01-24 PROCEDURE — 82374 ASSAY BLOOD CARBON DIOXIDE: CPT

## 2025-01-24 PROCEDURE — 93010 ELECTROCARDIOGRAM REPORT: CPT | Performed by: INTERNAL MEDICINE

## 2025-01-24 PROCEDURE — 84100 ASSAY OF PHOSPHORUS: CPT

## 2025-01-24 PROCEDURE — 82310 ASSAY OF CALCIUM: CPT

## 2025-01-24 PROCEDURE — 84484 ASSAY OF TROPONIN QUANT: CPT

## 2025-01-24 RX ADMIN — CINACALCET 30 MG: 30 TABLET ORAL at 10:02

## 2025-01-24 RX ADMIN — Medication 1 PATCH: at 10:04

## 2025-01-24 RX ADMIN — CLONIDINE HYDROCHLORIDE 0.1 MG: 0.1 TABLET ORAL at 10:03

## 2025-01-24 RX ADMIN — LISINOPRIL 40 MG: 10 TABLET ORAL at 10:02

## 2025-01-24 RX ADMIN — Medication 25 MG: at 20:28

## 2025-01-24 RX ADMIN — METOPROLOL SUCCINATE 50 MG: 50 TABLET, EXTENDED RELEASE ORAL at 10:03

## 2025-01-24 RX ADMIN — CLONIDINE HYDROCHLORIDE 0.1 MG: 0.1 TABLET ORAL at 20:29

## 2025-01-24 RX ADMIN — ATORVASTATIN CALCIUM 40 MG: 20 TABLET, FILM COATED ORAL at 10:02

## 2025-01-24 RX ADMIN — OLANZAPINE 5 MG: 5 TABLET, ORALLY DISINTEGRATING ORAL at 20:29

## 2025-01-24 RX ADMIN — AMLODIPINE BESYLATE 10 MG: 5 TABLET ORAL at 10:03

## 2025-01-24 RX ADMIN — SENNOSIDES AND DOCUSATE SODIUM 1 TABLET: 50; 8.6 TABLET ORAL at 10:03

## 2025-01-24 RX ADMIN — FUROSEMIDE 40 MG: 40 TABLET ORAL at 10:02

## 2025-01-24 RX ADMIN — MELATONIN TAB 3 MG 3 MG: 3 TAB at 20:29

## 2025-01-24 ASSESSMENT — ACTIVITIES OF DAILY LIVING (ADL)
ADLS_ACUITY_SCORE: 66
ORAL_HYGIENE: INDEPENDENT
ADLS_ACUITY_SCORE: 66
HYGIENE/GROOMING: INDEPENDENT
ADLS_ACUITY_SCORE: 66
DRESS: INDEPENDENT
ADLS_ACUITY_SCORE: 66
ADLS_ACUITY_SCORE: 66
LAUNDRY: WITH SUPERVISION
ADLS_ACUITY_SCORE: 66

## 2025-01-24 NOTE — PROVIDER NOTIFICATION
"Psychiatry Cross Cover Note    Subjective:   Notified by staff that patient has 3+ edema on wrists and ankles. No shortness of breathing. Patient is alert and  VSS.    Objective:     /74   Pulse 76   Temp 98.2  F (36.8  C) (Oral)   Resp 18   Wt 114.8 kg (253 lb 1.6 oz)   SpO2 99%   BMI 40.85 kg/m      Assessment:    Per chart, patient has chronic pitting edema.  He has been seen by medicine and recommended to observe and monitor. Per chart has hx of CHF. He is on furosemide 40 mg alongside clonidine, lisinopril, amlodipine, metoprolol. US venous doppler of bilateral lower extremities is negative for DVT. No SOB or signs of pulm edema, chest pain, or decrease perfusion. VSS.    Plan:    - No changes in medications at this time.   - Medicine re-consult per primary team's discretion and labs results.  - EKG at the AM  - CMP/Ca/P, BNP, troponin in the AM    Resident On-call  Kat \"Echo\" MD Jeff  PGY 2, Psychiatry Resident    "

## 2025-01-24 NOTE — PLAN OF CARE
BEH IP Unit Acuity Rating Score (UARS)  Patient is given one point for every criteria they meet.    CRITERIA SCORING   On a 72 hour hold, court hold, committed, stay of commitment, or revocation. 1    Patient LOS on BEH unit exceeds 20 days. 1  LOS: 104   Patient under guardianship, 55+, otherwise medically complex, or under age 11. 1   Suicide ideation without relief of precipitating factors. 0   Current plan for suicide. 0   Current plan for homicide. 0   Imminent risk or actual attempt to seriously harm another without relief of factors precipitating the attempt. 1   Severe dysfunction in daily living (ex: complete neglect for self care, extreme disruption in vegetative function, extreme deterioration in social interactions). 1   Recent (last 7 days) or current physical aggression in the ED or on unit. 0   Restraints or seclusion episode in past 72 hours. 0   Recent (last 7 days) or current verbal aggression, agitation, yelling, etc., while in the ED or unit. 0   Active psychosis. 1   Need for constant or near constant redirection (from leaving, from others, etc).  0   Intrusive or disruptive behaviors. 1   Patient requires 3 or more hours of individualized nursing care per 8-hour shift (i.e. for ADLs, meds, therapeutic interventions). 1   TOTAL 8

## 2025-01-24 NOTE — PLAN OF CARE
"  Problem: Adult Behavioral Health Plan of Care  Goal: Adheres to Safety Considerations for Self and Others  Outcome: Progressing     Problem: Psychotic Signs/Symptoms  Goal: Improved Behavioral Control (Psychotic Signs/Symptoms)  Outcome: Progressing     Problem: Depression  Goal: Improved Mood  Outcome: Progressing     Problem: Suicidal Behavior  Goal: Suicidal Behavior is Absent or Managed  Outcome: Progressing     Problem: Sleep Disturbance  Goal: Adequate Sleep/Rest  Outcome: Progressing   Goal Outcome Evaluation:    Pt is visible in the milieu. He is eating and drinking adequately. He is cooperative with medication and labs/tests - though it took time to convince him in the morning. Pt sits by his favorite chair at the [facility] area, and sleeps off and on his chair. Pt declined offer of shower. Intermittently , pt would speak with peers. Interactions were appropriate. Pt complained of \"hurting\" during EKG. Updated resident about it. New orders made.     "

## 2025-01-24 NOTE — PROGRESS NOTES
----------------------------------------------------------------------------------------------------------  [facility]  Psychiatry Progress Note  Hospital Day #104     Interim History:     The patient's care was discussed with the treatment team and chart notes were reviewed.    Identifier: Estevan Aaron is a 64 year old male with previous psychiatric diagnoses of  generalized anxiety disorder, Neurocognitive disorder, admitted from the ED 10/12/2024 due to concern for HI and psychosis in the context of medical issues (hyperthyroidism, hypercalcemia) psychosocial stressors including family dynamics with recent possible divorce.    Sleep: 5.25 hours (01/24/25 0600)  Scheduled medications: Took all scheduled medications as prescribed  Psychiatric PRN medications:       Staff Report:   Patient visibile in the milieu, often falling asleep in the milieu during the day. Denied all mental health symptoms.      Subjective:     Patient Interview:  Santos was interviewed in the his room. Discussed his recent EKG, which was reassuring. Evaluated his leg edema, suggested using his compression socks. Otherwise says that he's doing well. Discusses riding a alberto wheel with family members. No other concerns at this time.     ROS:  See above     Objective:     Vitals:  /74   Pulse 76   Temp 98.2  F (36.8  C) (Oral)   Resp 18   Wt 114.8 kg (253 lb 1.6 oz)   SpO2 99%   BMI 40.85 kg/m      Allergies:  No Known Allergies    Current Medications:  Scheduled:  Current Facility-Administered Medications   Medication Dose Route Frequency Provider Last Rate Last Admin    acetaminophen (TYLENOL) tablet 650 mg  650 mg Oral Q4H PRN Nikki Caceres MD   650 mg at 01/09/25 0228    alum & mag hydroxide-simethicone (MAALOX) suspension 30 mL  30 mL Oral Q4H PRN Nkiki Caceres MD   30 mL at 10/17/24 0837    amLODIPine (NORVASC) tablet 10 mg  10 mg Oral Daily Presley Barrera MD   10 mg  at 01/23/25 0813    atorvastatin (LIPITOR) tablet 40 mg  40 mg Oral Daily Nikki Caceres MD   40 mg at 01/23/25 0812    cholecalciferol (VITAMIN D3) capsule 1,250 mcg  1,250 mcg Oral Q7 Days Evelia Grimm DO   1,250 mcg at 01/21/25 1059    cinacalcet (SENSIPAR) tablet 30 mg  30 mg Oral Daily Presley Barrera MD   30 mg at 01/23/25 0813    cloNIDine (CATAPRES) tablet 0.1 mg  0.1 mg Oral BID Presley Barrera MD   0.1 mg at 01/23/25 2116    diclofenac (VOLTAREN) 1 % topical gel 2 g  2 g Topical 4x Daily PRN Rocío Lopez MD   2 g at 12/22/24 2032    furosemide (LASIX) tablet 40 mg  40 mg Oral Daily Presley Barrera MD   40 mg at 01/23/25 0814    gabapentin (NEURONTIN) capsule 100 mg  100 mg Oral Q6H PRN Nikki Caceres MD   100 mg at 12/24/24 0046    hydrocortisone (CORTAID) 0.5 % cream   Topical Daily PRN Savi Chamorro MD        Lidocaine (LIDOCARE) 4 % Patch 1 patch  1 patch Transdermal Q24H PRN Rocío Lopez MD   1 patch at 12/22/24 2023    lisinopril (ZESTRIL) tablet 40 mg  40 mg Oral Daily Presley Barrera MD   40 mg at 01/23/25 0814    melatonin tablet 3 mg  3 mg Oral At Bedtime Presley Barrera MD   3 mg at 01/23/25 2116    metoprolol succinate ER (TOPROL XL) 24 hr tablet 50 mg  50 mg Oral Daily Presley Barrera MD   50 mg at 01/23/25 0813    nicotine (NICODERM CQ) 14 MG/24HR 24 hr patch 1 patch  1 patch Transdermal Daily Evelia Grimm DO   1 patch at 01/22/25 0901    nicotine (NICORETTE) gum 2 mg  2 mg Buccal Q1H PRN González Garcia MD   2 mg at 10/24/24 1254    OLANZapine zydis (zyPREXA) ODT tab 5 mg  5 mg Oral At Bedtime González Garcia MD   5 mg at 01/23/25 2117    Or    OLANZapine (zyPREXA) injection 10 mg  10 mg Intramuscular At Bedtime González Garcia MD   10 mg at 01/12/25 2211    OLANZapine (zyPREXA) tablet 5 mg  5 mg Oral TID PRN Evelia Grimm DO   5 mg at 01/05/25 1645    Or    OLANZapine (zyPREXA) injection 5 mg  5 mg Intramuscular TID PRN  Evelia Grimm DO        polyethylene glycol (MIRALAX) Packet 17 g  17 g Oral Daily PRN Nikki Caceres MD        senna-docusate (SENOKOT-S/PERICOLACE) 8.6-50 MG per tablet 1 tablet  1 tablet Oral Daily Evelia Grimm DO   1 tablet at 01/23/25 0813    traZODone (DESYREL) half-tab 25 mg  25 mg Oral At Bedtime Rocío Lopez MD   25 mg at 01/23/25 2116    traZODone (DESYREL) half-tab 25 mg  25 mg Oral At Bedtime PRN Evelia Grimm DO   25 mg at 12/24/24 0046       PRN:  Current Facility-Administered Medications   Medication Dose Route Frequency Provider Last Rate Last Admin    acetaminophen (TYLENOL) tablet 650 mg  650 mg Oral Q4H PRN Nikki Caceres MD   650 mg at 01/09/25 0228    alum & mag hydroxide-simethicone (MAALOX) suspension 30 mL  30 mL Oral Q4H PRN Nikki Caceres MD   30 mL at 10/17/24 0837    amLODIPine (NORVASC) tablet 10 mg  10 mg Oral Daily Presley Barrera MD   10 mg at 01/23/25 0813    atorvastatin (LIPITOR) tablet 40 mg  40 mg Oral Daily Nikki Caceres MD   40 mg at 01/23/25 0812    cholecalciferol (VITAMIN D3) capsule 1,250 mcg  1,250 mcg Oral Q7 Days Evelia Grimm DO   1,250 mcg at 01/21/25 1059    cinacalcet (SENSIPAR) tablet 30 mg  30 mg Oral Daily Presley Barrera MD   30 mg at 01/23/25 0813    cloNIDine (CATAPRES) tablet 0.1 mg  0.1 mg Oral BID Presley Barrera MD   0.1 mg at 01/23/25 2116    diclofenac (VOLTAREN) 1 % topical gel 2 g  2 g Topical 4x Daily PRN Rocío Lopez MD   2 g at 12/22/24 2032    furosemide (LASIX) tablet 40 mg  40 mg Oral Daily Presley Barrera MD   40 mg at 01/23/25 0814    gabapentin (NEURONTIN) capsule 100 mg  100 mg Oral Q6H PRN Nikki Caceres MD   100 mg at 12/24/24 0046    hydrocortisone (CORTAID) 0.5 % cream   Topical Daily PRN Savi Chamorro MD        Lidocaine (LIDOCARE) 4 % Patch 1 patch  1 patch Transdermal Q24H PRN Rocío Lopez MD   1 patch at 12/22/24 2023    lisinopril (ZESTRIL)  tablet 40 mg  40 mg Oral Daily Presley Barrera MD   40 mg at 01/23/25 0814    melatonin tablet 3 mg  3 mg Oral At Bedtime Presley Barrera MD   3 mg at 01/23/25 2116    metoprolol succinate ER (TOPROL XL) 24 hr tablet 50 mg  50 mg Oral Daily Presley Barrera MD   50 mg at 01/23/25 0813    nicotine (NICODERM CQ) 14 MG/24HR 24 hr patch 1 patch  1 patch Transdermal Daily Evelia Grimm DO   1 patch at 01/22/25 0901    nicotine (NICORETTE) gum 2 mg  2 mg Buccal Q1H PRN González Garcia MD   2 mg at 10/24/24 1254    OLANZapine zydis (zyPREXA) ODT tab 5 mg  5 mg Oral At Bedtime González Garcia MD   5 mg at 01/23/25 2117    Or    OLANZapine (zyPREXA) injection 10 mg  10 mg Intramuscular At Bedtime González Garcia MD   10 mg at 01/12/25 2211    OLANZapine (zyPREXA) tablet 5 mg  5 mg Oral TID PRN Evelia Grimm DO   5 mg at 01/05/25 1645    Or    OLANZapine (zyPREXA) injection 5 mg  5 mg Intramuscular TID PRN Evelia Grimm DO        polyethylene glycol (MIRALAX) Packet 17 g  17 g Oral Daily PRN Nikki Caceres MD        senna-docusate (SENOKOT-S/PERICOLACE) 8.6-50 MG per tablet 1 tablet  1 tablet Oral Daily Evelia Grimm DO   1 tablet at 01/23/25 0813    traZODone (DESYREL) half-tab 25 mg  25 mg Oral At Bedtime Rocío Lopez MD   25 mg at 01/23/25 2116    traZODone (DESYREL) half-tab 25 mg  25 mg Oral At Bedtime PRN Evelia Grimm DO   25 mg at 12/24/24 0046     Labs and Imaging:  Data this admission:  - CBC unremarkable  - CMP unremarkable  - TSH normal  - UDS negative  - Vit D low  - Hgb A1c 5.9 (10/13/24)  - Lipids unremarkable  - Vit B12 normal  - Folate normal  - Urinalysis unremarkable  - EKG normal sinus rhythm, QTc 390 ms  - Head CT showed no acute changes  - HIV non reactive  - Treponema antibody non reactive  - ESR wnl   - Ceruloplasmin wnl   - BOOGIE negative  - Lyme negative      Parathyroid hormone:   85 (10/13)     Calcium:  11.4 (10/14) -> 10.3 (10/16) -> 9.8 (10/19) -> 10.6 (10/21) -> 10.3  "(10/23)     Albumin:  4.3 (10/14) -->  4.3 (10/16) -> 4.1 (10/17) -> 4.2 (10/19) -> 4.5 (10/21) -> 4.3 (10/23)      Mental Status Exam:   Oriented to:  Person/Self  General:  Awake and Alert  Appearance:  appears stated age, Grooming is adequate, Dressed in own clothing, and overweight  Behavior/Attitude:  Calm, Cooperative, and Easily distracted  Eye Contact: Appropriate  Psychomotor: Normal and No evidence of tics, dystonia, or tardive dyskinesia  no catatonia present  Speech:  appropriate volume/tone  Language: Fluent in English with appropriate syntax and vocabulary.  Mood:  \"good\"   Affect:  appropriate and congruent with mood  Thought Process:  linear and coherent  Thought Content:   No SI/HI/AH/VH; No apparent delusions  Associations:  loose  Insight:  impaired due to neurocognitive disorder   Judgment:  impaired due to neurocognitive disorder  Impulse control: impaired  Attention Span:  inattentive  Concentration:   impaired by neurocognitive disorder  Recent and Remote Memory:   remote memory intact, recent memory impaired  Fund of Knowledge: average  Muscle Strength and Tone: normal  Gait and Station: Normal     Psychiatric Assessment     Estevan Aaron is a 64 year old male with previous psychiatric diagnoses of GEORGINA admitted from the ER on 10/12/2024 due to concern for HI and psychosis in the context of medical issues (hyperthyroidism, hypercalcemia), psychosocial stressors including with recent divorce and selling his home. This is the patient's first psychiatric hospitalization. The MSE on admission was pertinent for a thought process which was perseverative, circumstantial, tangential, disorganized and tangential; with rambling and looseness of associations. Psychological contributions to presentation included lack of insight. Social factors contributing to presentation included isolation, recent divorce, selling his house, and moving from hotel to hotel. Biological contributions to presentation " included a history of hyperparathyroidism with chronic hypercalcemia per charts as well as a history of methamphetamine use per collateral from ex-wife.     Per collateral from ex-wife, Santos has had paranoid ideations since they first met. He has always felt like people are out to get him or trying to rip him off. She says that he has had visual hallucinations (he will point things out that the rest of the family can't see) for a long time, but the family would just go along with him to avoid making him angry. This timeline and presentation could be consistent with diagnosis of paranoid personality disorder. Things began to worsen around the beginning of the COVID pandemic, when Santos became more isolated and the family started to notice cognitive issues such as impaired memory. Other things that could be contributing to his presentation is hyperparathyroidism with hypercalcemia. However, patient's calcium returned to normal limits on 10/16, and patient continues to have disorganized behavior unrelated to calcium elevation, so likely this is not a significant contributing factor to patient's presentation.      Currently, Santos is at times disoriented, but easily re-directable. Presents with some difficulty of word articulation, which contributes to his frustration when interacting with psych team, as he finds it difficult to explain himself. He is able to take care of his ADLs without much assistance and he is mostly independent in the unit. On the other hand, his confused behavior tend to increase in the evenings, seemingly related to  Neurocognitive Disorder diagnosis, and this could evolved to be his new baseline. Either way, Santos does not present as a danger to himself or other so his SIO was discontinued. Santos does not present with any new episodes of physical agitation and is able to attend groups and interact with peers without major altercations. Patient currently is stable with current neuroleptic dose, patient  probably wont benefit from any modification in current therapy.          Psychiatric Plan by Diagnosis     Today's changes:  - No medication changes.     # Major Neurocognitive Disorder  1. Medications:  - Olanzapine 5 mg at bedtime     3. Additional Plans:  - Patient will be treated in therapeutic milieu with appropriate individual and group therapies as described  - Pending memory facility placement.      # Unspecified anxiety vs Generalized Anxiety Disorder  - Monitor for symptoms.  - Fluoxetine held due to suspicion of ongoing manic symptoms     Psychiatric Hospital Course:      Estevan Aaron was admitted to [facility] on a 72 hour hold.   Medications:  PTA fluoxetine was held due to concern for worsening of zelalem   New medications started at the time of admission include Zyprexa.   Increased olanzapine 10 mg at bedtime was to 10 mg BID (10/14)   Increased olanzapine 10 mg BID to 10 mg during day and 15 mg at bedtime (10/15)  10/21: increased olanzapine pm dose from 15 to 20 mg  10/23: started melatonin 3 mg at 7 pm to help patient with circadian rhythm as he has been staying up throughout the night and sleeping a lot during the day and there is some concern for delirium   10/24: consulted anesthesia for MRI brain   10/28: MRI brain complete, decrease AM olanzapine from 10 to 5 mg  10/29: Morning olanzapine decreased to 2.5 mg, evening olanzapine decreased to 15 mg   11/1: Decreased evening olanzapine to 10 mg   11/4: Decreased evening olanzapine to 7.5 mg   11/5: Decreased evening olanzapine to 5 mg   11/11: Moved morning dose of olanzapine to the afternoon as patient appears more agitated in the afternoons/evenings  11/21: Started memantine 5 mg daily   11/26: Discontinued olanzapine 2.5 mg during the afternoon  12/2:   Discontinued memantine 5 mg, daily    The risks, benefits, alternatives, and side effects were discussed and understood by the patient and other caregivers.     Medical Assessment and Plan      Medical diagnoses to be addressed this admission:    # Hip Pain  - New right hip pain, and mild pain in multiple joints. Moderate response to topical antiinflammatory gel and lidocaine patch.   - Medicine consulted for recommendations 12/20    # CHF  # Hypertension  - Continue PTA medications  Furosemide 40 mg daily  Lisinopril 40 mg daily  Metoprolol 50 mg daily  Amlodipine 10 mg daily  Clonidine 0.1 mg BID  - Pitting edema in lower extremities, not painful 3+: Medicine consulted for recommendations 12/20  - Weight gain of about 8 pounds in about 2 months currently 249lb as of 01/07/25    # Hyperlipidemia  - Continue PTA Atorvastatin 40 mg     # Primary Hyperparathyroidism  # Hypercalcemia, hypophosphoremia   Increased Ca level to 10.9 and decreased Ph level to 2.3 in the ED   - Consulted endocrinology, who started cinacalcet 30 mg BID on 10/13  - 10/16 endocrinology recommended continuing to trend calcium and albumin to make sure patient does not become hypocalcemic and recommended holding cinacalcet   - 10/17, calcium and albumin wnl, endo recommended cinacalcet 30 mg once daily   - 10/21, per endo continue cincaclcet and recheck calcium and albumin on October 24  - 10/23: per endo, patient needs to follow up outpatient for further management of hyperparathyroidism     # Rhinorrhea  1/15 currently multiple COVID infections on unit. No other symptoms of COVID noted. COVID PCR - on 1/13.    Medical course: Patient was physically examined by the ED prior to being transferred to the unit and was found to be medically stable and appropriate for admission.      Consults: Psychiatry, Endocrinology (follow-up of hyperthyroidism / hypercalcemia and hypertension), neurology     Checklist     Legal Status: Committed   Ortega meds: Haldol, Clozaril, Risperdal, Invega, Zyprexa, Seroquel, Abilify    Safety Assessment:   Behavioral Orders   Procedures    Code 1 - Restrict to Unit    Routine Programming     As clinically  indicated    Status 15     Every 15 minutes.    Status Individual Observation     Patient SIO status reviewed with team/RN.  Please also refer to RN/team documentation for add'l detail.    -SIO staff to monitor following which have contributed to patient being on SIO:  Patient intrusiveness to other patients  -Possible interventions SIO staff could use to support patient's treatment progress:  Redirection  -When following observed, team will review discontinuation of SIO:  Improvement in intrusive behavior.     Order Specific Question:   CONTINUOUS 24 hours / day     Answer:   Other     Order Specific Question:   Specify distance     Answer:   10 feet     Order Specific Question:   Indications for SIO     Answer:   Severe intrusiveness       Risk Assessment:  Risk for harm is low  Risk factors: impulsive and past behaviors  Protective factors: family      SIO: No    Disposition: Pending assessment for memory care facility.       Attestations     This patient was seen and discussed with my attending physician.  Evelia Grimm DO  PGY-1 Psychiatry Resident      Attestation:  This patient has been seen and evaluated by me, Pete Smith MD.  I have discussed this patient with the house staff team including the resident and/or medical student and I agree with the findings and plan in this note.    I have reviewed today's vital signs, medications, labs and imaging. Pete Smith MD , PhD.

## 2025-01-24 NOTE — PLAN OF CARE
Team Note Due:  Monday    Assessment/Intervention/Current Symtoms and Care Coordination:  Chart review and met with team, discussed pt progress, symptomology, and response to treatment.  Discussed the discharge plan and any potential impediments to discharge. Clifford Aaron was emergency guardian/conservator until 01/20/2025. A petition for permanent guardianship/conservatorship has been filed.    Received update from Clifford she has arranged things with [facility] and they would like to plan on pt moving in on Monday February 3. Family requesting ambulance transport.    Sent update to WAYNE Ryan.    Will coordinate with [facility] next week on any paperwork they will need MD to complete prior to transition.    Discharge Plan or Goal:  Memory care facility     Barriers to Discharge:  Patient requires further psychiatric stabilization due to current symptomology, medication management with changes subject to provider, coordination with outside supports, and aftercare planning. Pt is under civil commitment.     Referral Status:  Memory Care approved:  [facility]. Tour completed 11/30. Per Clifford on 1/8, she would like to move forward with a referral. Approved to move in on 2/3.    Memory Care facilities currently in process:  [facility]. Tour completed 11/27.  Vera (Exec Director): [phone]  [facility]. Tour scheduled 1/8/25. Have immediate openings and will accept EW. Family not requesting referral yet.  [facility]. Per Clifford on 1/20, she would like to move forward with a referral. Records sent 1/20.  [email or web address]    Memory Care facilities pending family review:  [facility].  [facility].  [facility].  [facility].  [facility].    Memory Care facilities declined:  [facility] (private pay only, no EW).  [facility]  [address] (private pay only, no EW).  [facility] (no openings).  [facility]. Tour completed 11/29. Records sent 12/2/24. Declined 12/19/24 (don't feel they are an appropriate facility for pt's needs).  Lorraine (): [email or web address]; [phone]  Isatu (director of nursing): [email or web address]  [facility].  Denied 12/26 (concerned about dementia with psychosis, history of hallucinations, high elopement risk, still on 1:1).  Nikki Noel: [email or web address]; Office [phone], Fax [phone]   [facility]. Not accepting EW as of 1/7/25.    Legal Status:  MI Commitment with [facility]: [place]  File Number: [id number]  Start and expiration date of commitment: 10/24/24 - 04/24/25    [facility] meds: Haldol, Clozaril, Risperdal, Invega, Zyprexa, Seroquel, Abilify    PPS/CM:  Shelby Chowdary: [phone]  [email or web address].    Contacts:  Maria C Aaron (Daughter): [phone]   Clifford Agnieszka (ex-wife): [phone]     No Del Angel (guardianship/conservatorship ): [phone]  [email or web address]    Derrell Duff ([employer] ): [phone]  [email or web address]     Upcoming Meetings and Dates/Important Information and next steps:  Schedule ambulance transport on 1/31 for Monday 2/3   Coordinate discharge/paperwork with [facility]  Send PD/Discharge Summary to   Send PD/COS to [facility]    Provisional Discharge and Change of Status needed at discharge

## 2025-01-24 NOTE — PLAN OF CARE
Problem: Sleep Disturbance  Goal: Adequate Sleep/Rest  Outcome: Progressing   Goal Outcome Evaluation:     Pt was in the lounge at the start of the shift. He later went  to his room to get some sleep. Pt slept for about 5.25 hours. No c/o pain and no prn was given. Continue to monitor and offer support.    Blood pressure 137/74, pulse 76, temperature 98.2  F (36.8  C), temperature source Oral, resp. rate 18, weight 114.8 kg (253 lb 1.6 oz), SpO2 99%.

## 2025-01-25 PROCEDURE — 250N000013 HC RX MED GY IP 250 OP 250 PS 637

## 2025-01-25 PROCEDURE — 124N000002 HC R&B MH UMMC

## 2025-01-25 RX ADMIN — SENNOSIDES AND DOCUSATE SODIUM 1 TABLET: 50; 8.6 TABLET ORAL at 08:25

## 2025-01-25 RX ADMIN — CLONIDINE HYDROCHLORIDE 0.1 MG: 0.1 TABLET ORAL at 20:06

## 2025-01-25 RX ADMIN — ATORVASTATIN CALCIUM 40 MG: 20 TABLET, FILM COATED ORAL at 08:25

## 2025-01-25 RX ADMIN — AMLODIPINE BESYLATE 10 MG: 5 TABLET ORAL at 08:25

## 2025-01-25 RX ADMIN — CLONIDINE HYDROCHLORIDE 0.1 MG: 0.1 TABLET ORAL at 08:25

## 2025-01-25 RX ADMIN — FUROSEMIDE 40 MG: 40 TABLET ORAL at 08:25

## 2025-01-25 RX ADMIN — MELATONIN TAB 3 MG 3 MG: 3 TAB at 20:06

## 2025-01-25 RX ADMIN — Medication 25 MG: at 20:06

## 2025-01-25 RX ADMIN — OLANZAPINE 5 MG: 5 TABLET, ORALLY DISINTEGRATING ORAL at 20:06

## 2025-01-25 RX ADMIN — Medication 1 PATCH: at 08:33

## 2025-01-25 RX ADMIN — LISINOPRIL 40 MG: 10 TABLET ORAL at 08:25

## 2025-01-25 RX ADMIN — CINACALCET 30 MG: 30 TABLET ORAL at 08:25

## 2025-01-25 ASSESSMENT — ACTIVITIES OF DAILY LIVING (ADL)
DRESS: INDEPENDENT
ORAL_HYGIENE: INDEPENDENT
ADLS_ACUITY_SCORE: 66
HYGIENE/GROOMING: INDEPENDENT
ADLS_ACUITY_SCORE: 66
LAUNDRY: WITH SUPERVISION
ADLS_ACUITY_SCORE: 66

## 2025-01-25 NOTE — PLAN OF CARE
Problem: Adult Behavioral Health Plan of Care  Goal: Adheres to Safety Considerations for Self and Others  Outcome: Progressing  Goal: Optimized Coping Skills in Response to Life Stressors  Outcome: Progressing     Problem: Psychotic Signs/Symptoms  Goal: Improved Behavioral Control (Psychotic Signs/Symptoms)  Outcome: Progressing     Problem: Sleep Disturbance  Goal: Adequate Sleep/Rest  Outcome: Progressing   Goal Outcome Evaluation:    Pt had a face time call with his daughters and sister this shift. The calls appear to have gone well. Pt showed some anxiety and eagerness to speak with his children. Pt is pleasant and cooperative with his medications. He is a little resistant and argumentative  when he does not understand or when he gets confused.  Pt continues to refuse shower. Pt is eating and drinking adequately. Pt denies all mental health symptoms. He continues to decline shower.

## 2025-01-25 NOTE — PLAN OF CARE
Problem: Sleep Disturbance  Goal: Adequate Sleep/Rest  Outcome: Progressing   Goal Outcome Evaluation:      Pt was visible in the lounge area intermittently this shift. He had an uneventful night. Pt denies pain and discomfort. He slept for about 5.75 hours.  No new concern,    Blood pressure 136/52, pulse 58, temperature 97.7  F (36.5  C), temperature source Oral, resp. rate 18, weight 114.8 kg (253 lb 1.6 oz), SpO2 98%.

## 2025-01-25 NOTE — PLAN OF CARE
Pt was out in the lounge most of the time this evening. Pt was noted to be dozing off and on in the chair. Pt presents with flat affect. Pt confused intermittently and with labile mood. Pt making illogical statements. Denies all mental health symptoms. Pt compliant with medications. Vital signs:  Temp: 97.7  F (36.5  C) Temp src: Oral BP: 136/52 Pulse: 58  SpO2: 98 % O2 Device: None (Room air)     Goal Outcome Evaluation:    Plan of Care Reviewed With: patient      Problem: Psychotic Signs/Symptoms  Goal: Improved Behavioral Control (Psychotic Signs/Symptoms)  Outcome: Progressing  Intervention: Manage Behavior  Recent Flowsheet Documentation  Taken 1/24/2025 1720 by Noah Murray RN  De-Escalation Techniques: verbally redirected     Problem: Psychotic Signs/Symptoms  Goal: Increased Participation and Engagement (Psychotic Signs/Symptoms)  Intervention: Facilitate Participation and Engagement  Recent Flowsheet Documentation  Taken 1/24/2025 1720 by Noah Murray RN  Diversional Activity: television

## 2025-01-26 PROCEDURE — 250N000013 HC RX MED GY IP 250 OP 250 PS 637

## 2025-01-26 PROCEDURE — 124N000002 HC R&B MH UMMC

## 2025-01-26 RX ADMIN — METOPROLOL SUCCINATE 50 MG: 50 TABLET, EXTENDED RELEASE ORAL at 09:04

## 2025-01-26 RX ADMIN — CLONIDINE HYDROCHLORIDE 0.1 MG: 0.1 TABLET ORAL at 09:04

## 2025-01-26 RX ADMIN — CINACALCET 30 MG: 30 TABLET ORAL at 09:04

## 2025-01-26 RX ADMIN — Medication 1 PATCH: at 09:06

## 2025-01-26 RX ADMIN — AMLODIPINE BESYLATE 10 MG: 5 TABLET ORAL at 09:04

## 2025-01-26 RX ADMIN — FUROSEMIDE 40 MG: 40 TABLET ORAL at 09:04

## 2025-01-26 RX ADMIN — MELATONIN TAB 3 MG 3 MG: 3 TAB at 20:40

## 2025-01-26 RX ADMIN — CLONIDINE HYDROCHLORIDE 0.1 MG: 0.1 TABLET ORAL at 20:41

## 2025-01-26 RX ADMIN — SENNOSIDES AND DOCUSATE SODIUM 1 TABLET: 50; 8.6 TABLET ORAL at 09:04

## 2025-01-26 RX ADMIN — OLANZAPINE 5 MG: 5 TABLET, ORALLY DISINTEGRATING ORAL at 20:40

## 2025-01-26 RX ADMIN — LISINOPRIL 40 MG: 10 TABLET ORAL at 09:04

## 2025-01-26 RX ADMIN — Medication 25 MG: at 20:41

## 2025-01-26 RX ADMIN — ATORVASTATIN CALCIUM 40 MG: 20 TABLET, FILM COATED ORAL at 09:04

## 2025-01-26 ASSESSMENT — ACTIVITIES OF DAILY LIVING (ADL)
HYGIENE/GROOMING: INDEPENDENT
ORAL_HYGIENE: INDEPENDENT
ADLS_ACUITY_SCORE: 66
LAUNDRY: WITH SUPERVISION
ADLS_ACUITY_SCORE: 66
ORAL_HYGIENE: INDEPENDENT
ADLS_ACUITY_SCORE: 66
DRESS: INDEPENDENT
ADLS_ACUITY_SCORE: 66
DRESS: INDEPENDENT
ADLS_ACUITY_SCORE: 66
LAUNDRY: WITH SUPERVISION
ADLS_ACUITY_SCORE: 66
HYGIENE/GROOMING: BATH
ADLS_ACUITY_SCORE: 66
ADLS_ACUITY_SCORE: 66

## 2025-01-26 NOTE — PLAN OF CARE
Pt was out in the lounge most of the time this evening watching tv with others. Pt was interactive with selective peers. Pt presents with flat affect. Intermittently confused. Denies for anxiety, depression, suicidal ideations, and hallucinations. Pt had adequate intake of fluids and food. Compliant with medications. No prn given this shift.Vital signs: Temp: 97.6  F (36.4  C) Temp src: Temporal BP: 135/76 Pulse: 59   Resp: 16 SpO2: 96 % O2 Device: None (Room air)           Goal Outcome Evaluation:    Plan of Care Reviewed With: patient    Problem: Psychotic Signs/Symptoms  Goal: Improved Behavioral Control (Psychotic Signs/Symptoms)  Intervention: Manage Behavior  Recent Flowsheet Documentation  Taken 1/25/2025 1623 by Noah Murray RN  De-Escalation Techniques: verbally redirected     Problem: Psychotic Signs/Symptoms  Goal: Improved Mood Symptoms (Psychotic Signs/Symptoms)  Intervention: Optimize Emotion and Mood  Recent Flowsheet Documentation  Taken 1/25/2025 1623 by Noah Murray RN  Diversional Activity: television

## 2025-01-26 NOTE — PLAN OF CARE
"  Problem: Adult Behavioral Health Plan of Care  Goal: Patient-Specific Goal (Individualization)  Description: You can add care plan individualizations to a care plan. Examples of Individualization might be:  \"Parent requests to be called daily at 9am for status\", \"I have a hard time hearing out of my right ear\", or \"Do not touch me to wake me up as it startles  me\".  Outcome: Progressing  Flowsheets (Taken 1/26/2025 1011)  Patient Personal Strengths:   expressive of emotions   positive vocational history  Goal: Adheres to Safety Considerations for Self and Others  Outcome: Progressing     Problem: Psychotic Signs/Symptoms  Goal: Improved Behavioral Control (Psychotic Signs/Symptoms)  Outcome: Progressing     Problem: Depression  Goal: Improved Mood  Outcome: Progressing     Problem: Suicidal Behavior  Goal: Suicidal Behavior is Absent or Managed  Outcome: Progressing   Goal Outcome Evaluation:    Plan of Care Reviewed With: patient      After much convincing, Pt able to take long shower . Staff assisted him to do his laundry as well. Pt is visible in the milieu, walking to and from his room. He is more active  and a little more persistent in his requests. He is medication compliant. He is eating and drinking adequately. Pt engaging and interacting with peers while watching TV . Pt sat at the [facility] area. He slept off and on while watching movies.     "

## 2025-01-26 NOTE — PLAN OF CARE
Problem: Sleep Disturbance  Goal: Adequate Sleep/Rest  Outcome: Progressing   Goal Outcome Evaluation:     Pt has been out in the lounge most this shift. He was not given any PRNs this shift. The pt did not report any pain or discomfort. He slept for about 2 hours. No behavioral concern at this time.    Blood pressure 135/76, pulse 59, temperature 97.6  F (36.4  C), temperature source Temporal, resp. rate 16, weight 114.8 kg (253 lb 1.6 oz), SpO2 96%.

## 2025-01-27 LAB
ATRIAL RATE - MUSE: 64 BPM
DIASTOLIC BLOOD PRESSURE - MUSE: NORMAL MMHG
INTERPRETATION ECG - MUSE: NORMAL
P AXIS - MUSE: 48 DEGREES
PR INTERVAL - MUSE: 196 MS
QRS DURATION - MUSE: 112 MS
QT - MUSE: 370 MS
QTC - MUSE: 381 MS
R AXIS - MUSE: 1 DEGREES
SYSTOLIC BLOOD PRESSURE - MUSE: NORMAL MMHG
T AXIS - MUSE: 60 DEGREES
VENTRICULAR RATE- MUSE: 64 BPM

## 2025-01-27 PROCEDURE — 250N000013 HC RX MED GY IP 250 OP 250 PS 637

## 2025-01-27 PROCEDURE — 99231 SBSQ HOSP IP/OBS SF/LOW 25: CPT | Mod: GC | Performed by: PSYCHIATRY & NEUROLOGY

## 2025-01-27 PROCEDURE — 124N000002 HC R&B MH UMMC

## 2025-01-27 RX ADMIN — CLONIDINE HYDROCHLORIDE 0.1 MG: 0.1 TABLET ORAL at 20:05

## 2025-01-27 RX ADMIN — CINACALCET 30 MG: 30 TABLET ORAL at 07:52

## 2025-01-27 RX ADMIN — SENNOSIDES AND DOCUSATE SODIUM 1 TABLET: 50; 8.6 TABLET ORAL at 07:52

## 2025-01-27 RX ADMIN — MELATONIN TAB 3 MG 3 MG: 3 TAB at 20:06

## 2025-01-27 RX ADMIN — METOPROLOL SUCCINATE 50 MG: 50 TABLET, EXTENDED RELEASE ORAL at 07:51

## 2025-01-27 RX ADMIN — Medication 25 MG: at 20:06

## 2025-01-27 RX ADMIN — LISINOPRIL 40 MG: 10 TABLET ORAL at 07:52

## 2025-01-27 RX ADMIN — CLONIDINE HYDROCHLORIDE 0.1 MG: 0.1 TABLET ORAL at 07:50

## 2025-01-27 RX ADMIN — OLANZAPINE 5 MG: 5 TABLET, ORALLY DISINTEGRATING ORAL at 20:06

## 2025-01-27 RX ADMIN — Medication 1 PATCH: at 08:01

## 2025-01-27 RX ADMIN — FUROSEMIDE 40 MG: 40 TABLET ORAL at 07:52

## 2025-01-27 RX ADMIN — ATORVASTATIN CALCIUM 40 MG: 20 TABLET, FILM COATED ORAL at 07:52

## 2025-01-27 RX ADMIN — AMLODIPINE BESYLATE 10 MG: 5 TABLET ORAL at 07:53

## 2025-01-27 ASSESSMENT — ACTIVITIES OF DAILY LIVING (ADL)
ADLS_ACUITY_SCORE: 66
ORAL_HYGIENE: PROMPTS;INDEPENDENT
ADLS_ACUITY_SCORE: 66
ORAL_HYGIENE: INDEPENDENT
ADLS_ACUITY_SCORE: 66
HYGIENE/GROOMING: INDEPENDENT
ADLS_ACUITY_SCORE: 66
DRESS: STREET CLOTHES;INDEPENDENT
ADLS_ACUITY_SCORE: 66
ADLS_ACUITY_SCORE: 66
LAUNDRY: WITH SUPERVISION
ADLS_ACUITY_SCORE: 66
HYGIENE/GROOMING: INDEPENDENT
ADLS_ACUITY_SCORE: 66
DRESS: INDEPENDENT

## 2025-01-27 NOTE — PLAN OF CARE
Pt was visible in the milieu. Pt was in the lounge most the time this evening watching football. Presents with flat affect. Pt was in good mood this evening and was making jokes to staff and smiling. Pt has intermittent confusion and makes illogical comments. Denies for SI/SIB/AH/VH, anxiety, and depression. No escalation behavior this shift. Compliant with medications. No prns  given this shift. Vital signs: Temp: 98.2  F (36.8  C) Temp src: Oral BP: 119/63 Pulse: 62     SpO2: 95 % O2 Device: None (Room air)     Goal Outcome Evaluation:    Plan of Care Reviewed With: patient      Problem: Psychotic Signs/Symptoms  Goal: Improved Behavioral Control (Psychotic Signs/Symptoms)  Outcome: Progressing  Intervention: Manage Behavior  Recent Flowsheet Documentation  Taken 1/26/2025 1731 by Noah Murray RN  De-Escalation Techniques: verbally redirected     Problem: Suicidal Behavior  Goal: Suicidal Behavior is Absent or Managed  Outcome: Progressing

## 2025-01-27 NOTE — PROGRESS NOTES
"  ----------------------------------------------------------------------------------------------------------  [facility]  Psychiatry Progress Note  Hospital Day #107     Interim History:     The patient's care was discussed with the treatment team and chart notes were reviewed.    Identifier: Estevan Aaron is a 64 year old male with previous psychiatric diagnoses of  generalized anxiety disorder, Neurocognitive disorder, admitted from the ED 10/12/2024 due to concern for HI and psychosis in the context of medical issues (hyperthyroidism, hypercalcemia) psychosocial stressors including family dynamics with recent possible divorce.    Sleep: 5.5 hours (01/27/25 0600)  Scheduled medications: Took all scheduled medications as prescribed  Psychiatric PRN medications:       Staff Report:   No acute events over the weekend. Engaged and more interactive with peers. Eating and drinking adequately without any issues.      Subjective:     Patient Interview:  Santos was interviewed in the milieu. Notes that he watched the football game last night, says that in his opinion, \"the chiefs lost\" despite the scores. When asked about his shirt ([employer] shirt), he mentions that he sent all his kids there. Has no other concerns at this time.     ROS:  See above     Objective:     Vitals:  BP (!) 145/68   Pulse 64   Temp 98.2  F (36.8  C) (Oral)   Resp 16   Wt 114.8 kg (253 lb 1.6 oz)   SpO2 95%   BMI 40.85 kg/m      Allergies:  No Known Allergies    Current Medications:  Scheduled:  Current Facility-Administered Medications   Medication Dose Route Frequency Provider Last Rate Last Admin    acetaminophen (TYLENOL) tablet 650 mg  650 mg Oral Q4H PRN Nikki Caceres MD   650 mg at 01/09/25 0228    alum & mag hydroxide-simethicone (MAALOX) suspension 30 mL  30 mL Oral Q4H PRN Nikki Caceres MD   30 mL at 10/17/24 0837    amLODIPine (NORVASC) tablet 10 mg  10 mg Oral Daily Bruce, " MD Presley   10 mg at 01/27/25 0753    atorvastatin (LIPITOR) tablet 40 mg  40 mg Oral Daily Nikki Caceres MD   40 mg at 01/27/25 0752    cholecalciferol (VITAMIN D3) capsule 1,250 mcg  1,250 mcg Oral Q7 Days Evelia Grimm DO   1,250 mcg at 01/21/25 1059    cinacalcet (SENSIPAR) tablet 30 mg  30 mg Oral Daily Preslye Barrera MD   30 mg at 01/27/25 0752    cloNIDine (CATAPRES) tablet 0.1 mg  0.1 mg Oral BID Presley Barrera MD   0.1 mg at 01/27/25 0750    diclofenac (VOLTAREN) 1 % topical gel 2 g  2 g Topical 4x Daily PRN Rocío Lopez MD   2 g at 12/22/24 2032    furosemide (LASIX) tablet 40 mg  40 mg Oral Daily Presley Barrera MD   40 mg at 01/27/25 0752    gabapentin (NEURONTIN) capsule 100 mg  100 mg Oral Q6H PRN Nikki Caceres MD   100 mg at 12/24/24 0046    hydrocortisone (CORTAID) 0.5 % cream   Topical Daily PRN Savi Chamorro MD        Lidocaine (LIDOCARE) 4 % Patch 1 patch  1 patch Transdermal Q24H PRN Rocío Lopez MD   1 patch at 12/22/24 2023    lisinopril (ZESTRIL) tablet 40 mg  40 mg Oral Daily Presley Barrera MD   40 mg at 01/27/25 0752    melatonin tablet 3 mg  3 mg Oral At Bedtime Presley Barrera MD   3 mg at 01/26/25 2040    metoprolol succinate ER (TOPROL XL) 24 hr tablet 50 mg  50 mg Oral Daily Presley Barrera MD   50 mg at 01/27/25 0751    nicotine (NICODERM CQ) 14 MG/24HR 24 hr patch 1 patch  1 patch Transdermal Daily Evelia Grimm DO   1 patch at 01/27/25 0801    nicotine (NICORETTE) gum 2 mg  2 mg Buccal Q1H PRN González Garcia MD   2 mg at 10/24/24 1254    OLANZapine zydis (zyPREXA) ODT tab 5 mg  5 mg Oral At Bedtime González Garcia MD   5 mg at 01/26/25 2040    Or    OLANZapine (zyPREXA) injection 10 mg  10 mg Intramuscular At Bedtime González Garcia MD   10 mg at 01/12/25 2211    OLANZapine (zyPREXA) tablet 5 mg  5 mg Oral TID PRN Evelia Grimm DO   5 mg at 01/05/25 1645    Or    OLANZapine (zyPREXA) injection 5 mg  5 mg  Intramuscular TID PRN Evelia Grimm DO        polyethylene glycol (MIRALAX) Packet 17 g  17 g Oral Daily PRN Nikki Caceres MD        senna-docusate (SENOKOT-S/PERICOLACE) 8.6-50 MG per tablet 1 tablet  1 tablet Oral Daily Evelia Grimm DO   1 tablet at 01/27/25 0752    traZODone (DESYREL) half-tab 25 mg  25 mg Oral At Bedtime Rocío Lopez MD   25 mg at 01/26/25 2041    traZODone (DESYREL) half-tab 25 mg  25 mg Oral At Bedtime PRN Evelia Grimm DO   25 mg at 12/24/24 0046       PRN:  Current Facility-Administered Medications   Medication Dose Route Frequency Provider Last Rate Last Admin    acetaminophen (TYLENOL) tablet 650 mg  650 mg Oral Q4H PRN Nikki Caceres MD   650 mg at 01/09/25 0228    alum & mag hydroxide-simethicone (MAALOX) suspension 30 mL  30 mL Oral Q4H PRN Nikki Caceres MD   30 mL at 10/17/24 0837    amLODIPine (NORVASC) tablet 10 mg  10 mg Oral Daily Presley Barrera MD   10 mg at 01/27/25 0753    atorvastatin (LIPITOR) tablet 40 mg  40 mg Oral Daily Nikki Caceres MD   40 mg at 01/27/25 0752    cholecalciferol (VITAMIN D3) capsule 1,250 mcg  1,250 mcg Oral Q7 Days Evelia Grimm DO   1,250 mcg at 01/21/25 1059    cinacalcet (SENSIPAR) tablet 30 mg  30 mg Oral Daily Presley Barrera MD   30 mg at 01/27/25 0752    cloNIDine (CATAPRES) tablet 0.1 mg  0.1 mg Oral BID Presley Barrera MD   0.1 mg at 01/27/25 0750    diclofenac (VOLTAREN) 1 % topical gel 2 g  2 g Topical 4x Daily PRN Rocío Lopez MD   2 g at 12/22/24 2032    furosemide (LASIX) tablet 40 mg  40 mg Oral Daily Presley Barrera MD   40 mg at 01/27/25 0752    gabapentin (NEURONTIN) capsule 100 mg  100 mg Oral Q6H PRN Nikki Caceres MD   100 mg at 12/24/24 0046    hydrocortisone (CORTAID) 0.5 % cream   Topical Daily PRN Savi Chamorro MD        Lidocaine (LIDOCARE) 4 % Patch 1 patch  1 patch Transdermal Q24H PRN Rocío Lopez MD   1 patch at 12/22/24 2023     lisinopril (ZESTRIL) tablet 40 mg  40 mg Oral Daily Presley Barrera MD   40 mg at 01/27/25 0752    melatonin tablet 3 mg  3 mg Oral At Bedtime Presley Barrera MD   3 mg at 01/26/25 2040    metoprolol succinate ER (TOPROL XL) 24 hr tablet 50 mg  50 mg Oral Daily Presley Barrera MD   50 mg at 01/27/25 0751    nicotine (NICODERM CQ) 14 MG/24HR 24 hr patch 1 patch  1 patch Transdermal Daily Evelia Grimm DO   1 patch at 01/27/25 0801    nicotine (NICORETTE) gum 2 mg  2 mg Buccal Q1H PRN González Garcia MD   2 mg at 10/24/24 1254    OLANZapine zydis (zyPREXA) ODT tab 5 mg  5 mg Oral At Bedtime González Garcia MD   5 mg at 01/26/25 2040    Or    OLANZapine (zyPREXA) injection 10 mg  10 mg Intramuscular At Bedtime González Garcia MD   10 mg at 01/12/25 2211    OLANZapine (zyPREXA) tablet 5 mg  5 mg Oral TID PRN Evelia Grimm DO   5 mg at 01/05/25 1645    Or    OLANZapine (zyPREXA) injection 5 mg  5 mg Intramuscular TID PRN Evelia Grimm DO        polyethylene glycol (MIRALAX) Packet 17 g  17 g Oral Daily PRN Nikki Caceres MD        senna-docusate (SENOKOT-S/PERICOLACE) 8.6-50 MG per tablet 1 tablet  1 tablet Oral Daily Evelia Grimm DO   1 tablet at 01/27/25 0752    traZODone (DESYREL) half-tab 25 mg  25 mg Oral At Bedtime Rocío Lopez MD   25 mg at 01/26/25 2041    traZODone (DESYREL) half-tab 25 mg  25 mg Oral At Bedtime PRN Evelia Grimm DO   25 mg at 12/24/24 0046     Labs and Imaging:  Data this admission:  - CBC unremarkable  - CMP unremarkable  - TSH normal  - UDS negative  - Vit D low  - Hgb A1c 5.9 (10/13/24)  - Lipids unremarkable  - Vit B12 normal  - Folate normal  - Urinalysis unremarkable  - EKG normal sinus rhythm, QTc 390 ms  - Head CT showed no acute changes  - HIV non reactive  - Treponema antibody non reactive  - ESR wnl   - Ceruloplasmin wnl   - BOOGIE negative  - Lyme negative      Parathyroid hormone:   85 (10/13)     Calcium:  11.4 (10/14) -> 10.3 (10/16) -> 9.8 (10/19) ->  "10.6 (10/21) -> 10.3 (10/23)     Albumin:  4.3 (10/14) -->  4.3 (10/16) -> 4.1 (10/17) -> 4.2 (10/19) -> 4.5 (10/21) -> 4.3 (10/23)      Mental Status Exam:   Oriented to:  Person/Self  General:  Awake and Alert  Appearance:  appears stated age, Grooming is adequate, Dressed in own clothing, and overweight  Behavior/Attitude:  Calm and Cooperative  Eye Contact: Appropriate  Psychomotor: Normal and No evidence of tics, dystonia, or tardive dyskinesia  no catatonia present  Speech:  appropriate volume/tone  Language: Fluent in English with appropriate syntax and vocabulary.  Mood:  \"good\"   Affect:  appropriate and congruent with mood  Thought Process:  linear and coherent  Thought Content:   No SI/HI/AH/VH; No apparent delusions  Associations:  loose  Insight:  impaired due to neurocognitive disorder   Judgment:  impaired due to neurocognitive disorder  Impulse control: impaired  Attention Span:  inattentive  Concentration:   impaired by neurocognitive disorder  Recent and Remote Memory:   remote memory intact, recent memory impaired  Fund of Knowledge: average  Muscle Strength and Tone: normal  Gait and Station: Normal     Psychiatric Assessment     Estevan Aaron is a 64 year old male with previous psychiatric diagnoses of GEORGINA admitted from the ER on 10/12/2024 due to concern for HI and psychosis in the context of medical issues (hyperthyroidism, hypercalcemia), psychosocial stressors including with recent divorce and selling his home. This is the patient's first psychiatric hospitalization. The MSE on admission was pertinent for a thought process which was perseverative, circumstantial, tangential, disorganized and tangential; with rambling and looseness of associations. Psychological contributions to presentation included lack of insight. Social factors contributing to presentation included isolation, recent divorce, selling his house, and moving from hotel to hotel. Biological contributions to presentation " included a history of hyperparathyroidism with chronic hypercalcemia per charts as well as a history of methamphetamine use per collateral from ex-wife.     Per collateral from ex-wife, Santos has had paranoid ideations since they first met. He has always felt like people are out to get him or trying to rip him off. She says that he has had visual hallucinations (he will point things out that the rest of the family can't see) for a long time, but the family would just go along with him to avoid making him angry. This timeline and presentation could be consistent with diagnosis of paranoid personality disorder. Things began to worsen around the beginning of the COVID pandemic, when Santos became more isolated and the family started to notice cognitive issues such as impaired memory. Other things that could be contributing to his presentation is hyperparathyroidism with hypercalcemia. However, patient's calcium returned to normal limits on 10/16, and patient continues to have disorganized behavior unrelated to calcium elevation, so likely this is not a significant contributing factor to patient's presentation.      Currently, Santos is at times disoriented, but easily re-directable. Presents with some difficulty of word articulation, which contributes to his frustration when interacting with psych team, as he finds it difficult to explain himself. He is able to take care of his ADLs without much assistance and he is mostly independent in the unit. On the other hand, his confused behavior tend to increase in the evenings, seemingly related to  Neurocognitive Disorder diagnosis, and this could evolved to be his new baseline. Either way, Santos does not present as a danger to himself or other so his SIO was discontinued. Santos does not present with any new episodes of physical agitation and is able to attend groups and interact with peers without major altercations. Patient currently is stable with current neuroleptic dose, patient  probably wont benefit from any modification in current therapy.          Psychiatric Plan by Diagnosis     Today's changes:  - No medication changes.     # Major Neurocognitive Disorder  1. Medications:  - Olanzapine 5 mg at bedtime     3. Additional Plans:  - Patient will be treated in therapeutic milieu with appropriate individual and group therapies as described  - Pending memory facility placement.      # Unspecified anxiety vs Generalized Anxiety Disorder  - Monitor for symptoms.  - Fluoxetine held due to suspicion of ongoing manic symptoms     Psychiatric Hospital Course:      Estevan Aaron was admitted to [facility] on a 72 hour hold.   Medications:  PTA fluoxetine was held due to concern for worsening of zelalem   New medications started at the time of admission include Zyprexa.   Increased olanzapine 10 mg at bedtime was to 10 mg BID (10/14)   Increased olanzapine 10 mg BID to 10 mg during day and 15 mg at bedtime (10/15)  10/21: increased olanzapine pm dose from 15 to 20 mg  10/23: started melatonin 3 mg at 7 pm to help patient with circadian rhythm as he has been staying up throughout the night and sleeping a lot during the day and there is some concern for delirium   10/24: consulted anesthesia for MRI brain   10/28: MRI brain complete, decrease AM olanzapine from 10 to 5 mg  10/29: Morning olanzapine decreased to 2.5 mg, evening olanzapine decreased to 15 mg   11/1: Decreased evening olanzapine to 10 mg   11/4: Decreased evening olanzapine to 7.5 mg   11/5: Decreased evening olanzapine to 5 mg   11/11: Moved morning dose of olanzapine to the afternoon as patient appears more agitated in the afternoons/evenings  11/21: Started memantine 5 mg daily   11/26: Discontinued olanzapine 2.5 mg during the afternoon  12/2:   Discontinued memantine 5 mg, daily    The risks, benefits, alternatives, and side effects were discussed and understood by the patient and other caregivers.     Medical Assessment and Plan      Medical diagnoses to be addressed this admission:    # Hip Pain  - New right hip pain, and mild pain in multiple joints. Moderate response to topical antiinflammatory gel and lidocaine patch.   - Medicine consulted for recommendations 12/20    # CHF  # Hypertension  - Continue PTA medications  Furosemide 40 mg daily  Lisinopril 40 mg daily  Metoprolol 50 mg daily  Amlodipine 10 mg daily  Clonidine 0.1 mg BID  - Pitting edema in lower extremities, not painful 3+: Medicine consulted for recommendations 12/20  - Weight gain of about 8 pounds in about 2 months currently 249lb as of 01/07/25    # Hyperlipidemia  - Continue PTA Atorvastatin 40 mg     # Primary Hyperparathyroidism  # Hypercalcemia, hypophosphoremia   Increased Ca level to 10.9 and decreased Ph level to 2.3 in the ED   - Consulted endocrinology, who started cinacalcet 30 mg BID on 10/13  - 10/16 endocrinology recommended continuing to trend calcium and albumin to make sure patient does not become hypocalcemic and recommended holding cinacalcet   - 10/17, calcium and albumin wnl, endo recommended cinacalcet 30 mg once daily   - 10/21, per endo continue cincaclcet and recheck calcium and albumin on October 24  - 10/23: per endo, patient needs to follow up outpatient for further management of hyperparathyroidism     # Rhinorrhea  1/15 currently multiple COVID infections on unit. No other symptoms of COVID noted. COVID PCR - on 1/13.    Medical course: Patient was physically examined by the ED prior to being transferred to the unit and was found to be medically stable and appropriate for admission.      Consults: Psychiatry, Endocrinology (follow-up of hyperthyroidism / hypercalcemia and hypertension), neurology     Checklist     Legal Status: Committed   Ortega meds: Haldol, Clozaril, Risperdal, Invega, Zyprexa, Seroquel, Abilify    Safety Assessment:   Behavioral Orders   Procedures    Code 1 - Restrict to Unit    Routine Programming     As clinically  indicated    Status 15     Every 15 minutes.    Status Individual Observation     Patient SIO status reviewed with team/RN.  Please also refer to RN/team documentation for add'l detail.    -SIO staff to monitor following which have contributed to patient being on SIO:  Patient intrusiveness to other patients  -Possible interventions SIO staff could use to support patient's treatment progress:  Redirection  -When following observed, team will review discontinuation of SIO:  Improvement in intrusive behavior.     Order Specific Question:   CONTINUOUS 24 hours / day     Answer:   Other     Order Specific Question:   Specify distance     Answer:   10 feet     Order Specific Question:   Indications for SIO     Answer:   Severe intrusiveness       Risk Assessment:  Risk for harm is low  Risk factors: impulsive and past behaviors  Protective factors: family      SIO: No    Disposition: Pending assessment for memory care facility.       Attestations     This patient was seen and discussed with my attending physician.  Evelia Grimm DO  PGY-1 Psychiatry Resident      Attestation:  This patient has been seen and evaluated by me, Pete Smith MD.  I have discussed this patient with the house staff team including the resident and/or medical student and I agree with the findings and plan in this note.    I have reviewed today's vital signs, medications, labs and imaging. Pete Smith MD , PhD.

## 2025-01-27 NOTE — PLAN OF CARE
Problem: Manic or Hypomanic Signs/Symptoms  Goal: Improved Mood Symptoms (Manic/Hypomanic Signs/Symptoms)  Outcome: Progressing     Problem: Sleep Disturbance  Goal: Adequate Sleep/Rest  Outcome: Progressing     Problem: Manic or Hypomanic Signs/Symptoms  Goal: Improved Mood Symptoms (Manic/Hypomanic Signs/Symptoms)  Outcome: Progressing     Problem: Psychotic Signs/Symptoms  Goal: Improved Behavioral Control (Psychotic Signs/Symptoms)  Outcome: Progressing   Goal Outcome Evaluation:    Pt was visible in the lounge watching TV with peers. Affect blunted and flat. Pt was pleasant on approach. Mood is calm. Pt denied any physical pain and discomfort . Denies all mental health and psych symptoms. Food and fluid intake adequate. Pt was compliant with medications. No safety concerns. No behavioral escalation.

## 2025-01-27 NOTE — PROVIDER NOTIFICATION
01/27/25 0913   Individualization/Patient Specific Goals   Patient Personal Strengths expressive of emotions;positive vocational history   Patient Vulnerabilities housing insecurity;lacks insight into illness;poor impulse control   Anxieties, Fears or Concerns None   Interprofessional Rounds   Summary Discussed patient progress and RN assessment for [facility] completed and approved. Possible dc next week 2/3/25   Participants nursing;CTC;psychiatrist;other (see comments);pharmacy   Behavioral Team Discussion   Participants Dr. Smith; Dr. Grimm; Rocío Curry RN; Jolie Holden MA LP; medical students; pharmacy   Progress Patient has been stable.  No changes in status. Complaint with meds and treatment plan   Anticipated length of stay 7 days   Continued Stay Criteria/Rationale Medication management; placement   Medical/Physical See H&P   Precautions See below   Plan Psychiatric assessment/Medication management. Therapeutic Milieu. Individual care planning and after care planning. Patient to participate in unit groups and activities. Individual and group support on unit.   Anticipated Discharge Disposition another healthcare facility

## 2025-01-27 NOTE — PLAN OF CARE
Brief Psychotherapy Note    Therapist checked in with Pt to remind Pt about psychotherapy service available.    Pt response: Pt at first reported he did want to meet, when offered to meet in interview room Pt remained seated in lounge, discussed his facial hair. D/t number of patients also in lounge writer encouraged Pt to meet in interview room but Pt appeared to not want to meet in interview room. Pt engaged in light conversation for a few more minutes then appeared to become disinterested in conversation.    Plan: Writer will remain available to Pt and continue to check in.

## 2025-01-27 NOTE — PLAN OF CARE
"  Problem: Adult Behavioral Health Plan of Care  Goal: Adheres to Safety Considerations for Self and Others  Outcome: Progressing     Problem: Psychotic Signs/Symptoms  Goal: Improved Behavioral Control (Psychotic Signs/Symptoms)  Outcome: Progressing     Problem: Depression  Goal: Improved Mood  Outcome: Progressing     Problem: Suicidal Behavior  Goal: Suicidal Behavior is Absent or Managed  Outcome: Progressing   Goal Outcome Evaluation:      Pt asked to speak with this RN after he had spoken with the team. Pt has expressed his disappointment that he thinks that the team is portraying him as incapable , that \"I cannot do it\", and wanted to have a team of professionals to work with him so he can prove to them that he can. Pt feelings were   acknowledged and he was provided emphatic  listening.   Pt then asked for candies to calm him down. Pt did complain of L knee pain that he is unable to rate. He was offered PRN, but he declined. Pt unable to fully express his thoughts and feelings because poverty of words. Continues to have poor insight.       "

## 2025-01-27 NOTE — PLAN OF CARE
BEH IP Unit Acuity Rating Score (UARS)  Patient is given one point for every criteria they meet.    CRITERIA SCORING   On a 72 hour hold, court hold, committed, stay of commitment, or revocation. 1    Patient LOS on BEH unit exceeds 20 days. 1  LOS: 107   Patient under guardianship, 55+, otherwise medically complex, or under age 11. 1   Suicide ideation without relief of precipitating factors. 0   Current plan for suicide. 0   Current plan for homicide. 0   Imminent risk or actual attempt to seriously harm another without relief of factors precipitating the attempt. 1   Severe dysfunction in daily living (ex: complete neglect for self care, extreme disruption in vegetative function, extreme deterioration in social interactions). 1   Recent (last 7 days) or current physical aggression in the ED or on unit. 0   Restraints or seclusion episode in past 72 hours. 0   Recent (last 7 days) or current verbal aggression, agitation, yelling, etc., while in the ED or unit. 0   Active psychosis. 1   Need for constant or near constant redirection (from leaving, from others, etc).  0   Intrusive or disruptive behaviors. 1   Patient requires 3 or more hours of individualized nursing care per 8-hour shift (i.e. for ADLs, meds, therapeutic interventions). 1   TOTAL 8

## 2025-01-27 NOTE — PLAN OF CARE
Problem: Sleep Disturbance  Goal: Adequate Sleep/Rest  Outcome: Progressing   Goal Outcome Evaluation:    Pt has been intermittently in the lounge. He slept for 5.5 hours. Pt denies pain. No PRN given this shift. No new concern at this time.    Blood pressure 119/63, pulse 62, temperature 98.2  F (36.8  C), temperature source Oral, resp. rate 16, weight 114.8 kg (253 lb 1.6 oz), SpO2 95%.

## 2025-01-28 PROCEDURE — 250N000013 HC RX MED GY IP 250 OP 250 PS 637

## 2025-01-28 PROCEDURE — 99231 SBSQ HOSP IP/OBS SF/LOW 25: CPT | Mod: GC | Performed by: PSYCHIATRY & NEUROLOGY

## 2025-01-28 PROCEDURE — 124N000002 HC R&B MH UMMC

## 2025-01-28 RX ADMIN — CLONIDINE HYDROCHLORIDE 0.1 MG: 0.1 TABLET ORAL at 20:26

## 2025-01-28 RX ADMIN — METOPROLOL SUCCINATE 50 MG: 50 TABLET, EXTENDED RELEASE ORAL at 08:11

## 2025-01-28 RX ADMIN — Medication 1250 MCG: at 11:46

## 2025-01-28 RX ADMIN — CLONIDINE HYDROCHLORIDE 0.1 MG: 0.1 TABLET ORAL at 08:10

## 2025-01-28 RX ADMIN — FUROSEMIDE 40 MG: 40 TABLET ORAL at 08:10

## 2025-01-28 RX ADMIN — SENNOSIDES AND DOCUSATE SODIUM 1 TABLET: 50; 8.6 TABLET ORAL at 08:10

## 2025-01-28 RX ADMIN — OLANZAPINE 5 MG: 5 TABLET, ORALLY DISINTEGRATING ORAL at 20:28

## 2025-01-28 RX ADMIN — Medication 25 MG: at 20:28

## 2025-01-28 RX ADMIN — ATORVASTATIN CALCIUM 40 MG: 20 TABLET, FILM COATED ORAL at 08:10

## 2025-01-28 RX ADMIN — AMLODIPINE BESYLATE 10 MG: 5 TABLET ORAL at 08:11

## 2025-01-28 RX ADMIN — LISINOPRIL 40 MG: 10 TABLET ORAL at 08:10

## 2025-01-28 RX ADMIN — CINACALCET 30 MG: 30 TABLET ORAL at 08:10

## 2025-01-28 RX ADMIN — MELATONIN TAB 3 MG 3 MG: 3 TAB at 20:27

## 2025-01-28 RX ADMIN — Medication 1 PATCH: at 08:25

## 2025-01-28 ASSESSMENT — ACTIVITIES OF DAILY LIVING (ADL)
LAUNDRY: WITH SUPERVISION
ADLS_ACUITY_SCORE: 66
DRESS: STREET CLOTHES;INDEPENDENT
ADLS_ACUITY_SCORE: 66
ADLS_ACUITY_SCORE: 66
HYGIENE/GROOMING: INDEPENDENT
ADLS_ACUITY_SCORE: 66
HYGIENE/GROOMING: INDEPENDENT
ORAL_HYGIENE: PROMPTS;INDEPENDENT
ADLS_ACUITY_SCORE: 66
ORAL_HYGIENE: INDEPENDENT
ADLS_ACUITY_SCORE: 66
DRESS: INDEPENDENT
ADLS_ACUITY_SCORE: 66

## 2025-01-28 NOTE — PLAN OF CARE
Problem: Sleep Disturbance  Goal: Adequate Sleep/Rest  Outcome: Progressing   Goal Outcome Evaluation:  Patient had an uneventful night, asleep at the beginning of the shift and for majority of the night. Alternates between sleeping the lounge area and own room. No behavioral issues. No complaints of pain. No requested or administered prn's. Respirations regular and non-labored. Routine safety checks in place q 15 minutes. Appears to have slept for 6.75 hours.

## 2025-01-28 NOTE — PLAN OF CARE
Team Note Due:  Monday    Assessment/Intervention/Current Symtoms and Care Coordination:  Chart review and met with team, discussed pt progress, symptomology, and response to treatment.  Discussed the discharge plan and any potential impediments to discharge. Clifford aAron was emergency guardian/conservator until 01/20/2025. A petition for permanent guardianship/conservatorship has been filed.    Clifford indicated [facility] will not allow pt to move in until guardianship is re-established (currently in process). She will provide updates on this as soon as she hears from her .    Discharge Plan or Goal:  Memory care facility     Barriers to Discharge:  Patient requires further psychiatric stabilization due to current symptomology, medication management with changes subject to provider, coordination with outside supports, and aftercare planning. Pt is under civil commitment.     Referral Status:  Memory Care approved:  [facility]. Tour completed 11/30. Per Clifford on 1/8, she would like to move forward with a referral. Approved to move in February but waiting on guardianship to be re-established before a discharge date can be set.    Memory Care facilities currently in process:  [facility]. Tour completed 11/27.  Vera (Exec Director): [phone]  [facility]. Tour scheduled 1/8/25. Have immediate openings and will accept EW. Family not requesting referral yet.  [facility]. Per Clifford on 1/20, she would like to move forward with a referral. Records sent 1/20.  [email or web address]    Memory Care facilities pending family review:  [facility].  [facility].  [facility].  [facility].  [facility].    Memory Care facilities declined:  [facility] (private pay only, no EW).  [facility] [address] (private pay only,  no EW).  [facility] (no openings).  [facility]. Tour completed 11/29. Records sent 12/2/24. Declined 12/19/24 (don't feel they are an appropriate facility for pt's needs).  Lorraine (): [email or web address]; [phone]  Isatu (director of nursing): [email or web address]  [facility].  Denied 12/26 (concerned about dementia with psychosis, history of hallucinations, high elopement risk, still on 1:1).  Nikki Noel: [email or web address]; Office [phone], Fax [phone]   [facility]. Not accepting EW as of 1/7/25.    Legal Status:  MI Commitment with [facility] [place]  File Number: [id number]  Start and expiration date of commitment: 10/24/24 - 04/24/25    [facility] meds: Haldol, Clozaril, Risperdal, Invega, Zyprexa, Seroquel, Abilify    PPS/CM:  Shelby Chowdary: [phone]  [email or web address].    Contacts:  Maria C Aaron (Daughter): [phone]   Clifford Agnieszka (ex-wife): [phone]     No Del Angel (guardianship/conservatorship ): [phone]  [email or web address]    Derrell Duff ([place] ): [phone]  [email or web address]     Upcoming Meetings and Dates/Important Information and next steps:  Schedule ambulance transport on 1/31 for Monday 2/3   Coordinate discharge/paperwork with [facility]  Send PD/Discharge Summary to   Send PD/COS to [facility]    Provisional Discharge and Change of Status needed at discharge

## 2025-01-28 NOTE — PLAN OF CARE
"  Problem: Adult Behavioral Health Plan of Care  Goal: Optimized Coping Skills in Response to Life Stressors  Outcome: Not Progressing     Problem: Depression  Goal: Improved Mood  Outcome: Not Progressing     Problem: Sleep Disturbance  Goal: Adequate Sleep/Rest  Outcome: Not Progressing   Goal Outcome Evaluation:    Plan of Care Reviewed With: patient    Pt mood in the morning is irritable. He said that he realized that he had been \"out of it\" for 3 weeks and he just realized it. He was questioning the medications I was giving him. Explained to pt that the medications I was giving him were basically blood pressure medications. He had expressed that he is \"furious\" with the situation . Pt cannot articulate what he wants to say and at times, would leave the sentence and just assume that he is understood. His feelings were acknowledged. Provided active listening to pt. He then went silent and had his breakfast.   Pt was observed to be more awake and engaging with peers this shift.   As the day progressed, pt mood improved. He talks to staff pleasantly.     "

## 2025-01-28 NOTE — PLAN OF CARE
BEH IP Unit Acuity Rating Score (UARS)  Patient is given one point for every criteria they meet.    CRITERIA SCORING   On a 72 hour hold, court hold, committed, stay of commitment, or revocation. 1    Patient LOS on BEH unit exceeds 20 days. 1  LOS: 108   Patient under guardianship, 55+, otherwise medically complex, or under age 11. 1   Suicide ideation without relief of precipitating factors. 0   Current plan for suicide. 0   Current plan for homicide. 0   Imminent risk or actual attempt to seriously harm another without relief of factors precipitating the attempt. 1   Severe dysfunction in daily living (ex: complete neglect for self care, extreme disruption in vegetative function, extreme deterioration in social interactions). 1   Recent (last 7 days) or current physical aggression in the ED or on unit. 0   Restraints or seclusion episode in past 72 hours. 0   Recent (last 7 days) or current verbal aggression, agitation, yelling, etc., while in the ED or unit. 0   Active psychosis. 1   Need for constant or near constant redirection (from leaving, from others, etc).  0   Intrusive or disruptive behaviors. 1   Patient requires 3 or more hours of individualized nursing care per 8-hour shift (i.e. for ADLs, meds, therapeutic interventions). 1   TOTAL 8

## 2025-01-28 NOTE — PROGRESS NOTES
"  ----------------------------------------------------------------------------------------------------------  [facility]  Psychiatry Progress Note  Hospital Day #108     Interim History:     The patient's care was discussed with the treatment team and chart notes were reviewed.    Identifier: Estevan Aaron is a 64 year old male with previous psychiatric diagnoses of  generalized anxiety disorder, Neurocognitive disorder, admitted from the ED 10/12/2024 due to concern for HI and psychosis in the context of medical issues (hyperthyroidism, hypercalcemia) psychosocial stressors including family dynamics with recent possible divorce.    Sleep: 6.75 hours (01/28/25 0600)  Scheduled medications: Took all scheduled medications as prescribed  Psychiatric PRN medications:  none     Staff Report:   No acute events over night. Intermittently irritable, often requiring redirection. Good food and fluid intake.      Subjective:     Patient Interview:  Santos was interviewed in the milieu. First words out of his mouth were \"90%! Can you imagine? Those chickens got chopped up with alligators\". Appears to be related to the movie he was watching.     He endorses delusion with someone having poisoned him, he doesn't know who or what poison but he feels like he can't remember the last few weeks. Reassured the patient that he wasn't poisoned. .     ROS:  See above     Objective:     Vitals:  /78   Pulse 60   Temp 97.7  F (36.5  C) (Oral)   Resp 16   Wt 114.8 kg (253 lb 1.6 oz)   SpO2 94%   BMI 40.85 kg/m      Allergies:  No Known Allergies    Current Medications:  Scheduled:  Current Facility-Administered Medications   Medication Dose Route Frequency Provider Last Rate Last Admin    acetaminophen (TYLENOL) tablet 650 mg  650 mg Oral Q4H PRN Nikki Caceres MD   650 mg at 01/09/25 0228    alum & mag hydroxide-simethicone (MAALOX) suspension 30 mL  30 mL Oral Q4H PRN Nicki, " MD Nikki   30 mL at 10/17/24 0837    amLODIPine (NORVASC) tablet 10 mg  10 mg Oral Daily Presley Barrera MD   10 mg at 01/28/25 0811    atorvastatin (LIPITOR) tablet 40 mg  40 mg Oral Daily Nikki Caceres MD   40 mg at 01/28/25 0810    cholecalciferol (VITAMIN D3) capsule 1,250 mcg  1,250 mcg Oral Q7 Days Evelia Grimm DO   1,250 mcg at 01/21/25 1059    cinacalcet (SENSIPAR) tablet 30 mg  30 mg Oral Daily Presley Barrera MD   30 mg at 01/28/25 0810    cloNIDine (CATAPRES) tablet 0.1 mg  0.1 mg Oral BID Presley Barrera MD   0.1 mg at 01/28/25 0810    diclofenac (VOLTAREN) 1 % topical gel 2 g  2 g Topical 4x Daily PRN Rocío Lopez MD   2 g at 12/22/24 2032    furosemide (LASIX) tablet 40 mg  40 mg Oral Daily Presley Barrera MD   40 mg at 01/28/25 0810    gabapentin (NEURONTIN) capsule 100 mg  100 mg Oral Q6H PRN Nikki Caceres MD   100 mg at 12/24/24 0046    hydrocortisone (CORTAID) 0.5 % cream   Topical Daily PRN Savi Chamorro MD        Lidocaine (LIDOCARE) 4 % Patch 1 patch  1 patch Transdermal Q24H PRN Rocío Lopez MD   1 patch at 12/22/24 2023    lisinopril (ZESTRIL) tablet 40 mg  40 mg Oral Daily Presley Barrera MD   40 mg at 01/28/25 0810    melatonin tablet 3 mg  3 mg Oral At Bedtime Presley Barrera MD   3 mg at 01/27/25 2006    metoprolol succinate ER (TOPROL XL) 24 hr tablet 50 mg  50 mg Oral Daily Presley Barrera MD   50 mg at 01/28/25 0811    nicotine (NICODERM CQ) 14 MG/24HR 24 hr patch 1 patch  1 patch Transdermal Daily Evelia Grimm DO   1 patch at 01/27/25 0801    nicotine (NICORETTE) gum 2 mg  2 mg Buccal Q1H PRN González Garcia MD   2 mg at 10/24/24 1254    OLANZapine zydis (zyPREXA) ODT tab 5 mg  5 mg Oral At Bedtime González Garcia MD   5 mg at 01/27/25 2006    Or    OLANZapine (zyPREXA) injection 10 mg  10 mg Intramuscular At Bedtime González Garcia MD   10 mg at 01/12/25 2211    OLANZapine (zyPREXA) tablet 5 mg  5 mg Oral  TID PRN Evelia Grimm DO   5 mg at 01/05/25 1645    Or    OLANZapine (zyPREXA) injection 5 mg  5 mg Intramuscular TID PRN Evelia Grimm DO        polyethylene glycol (MIRALAX) Packet 17 g  17 g Oral Daily PRN Nikki Cacerse MD        senna-docusate (SENOKOT-S/PERICOLACE) 8.6-50 MG per tablet 1 tablet  1 tablet Oral Daily Evelia Grimm DO   1 tablet at 01/28/25 0810    traZODone (DESYREL) half-tab 25 mg  25 mg Oral At Bedtime Rocío Lopez MD   25 mg at 01/27/25 2006    traZODone (DESYREL) half-tab 25 mg  25 mg Oral At Bedtime PRN Evelia Grimm DO   25 mg at 12/24/24 0046       PRN:  Current Facility-Administered Medications   Medication Dose Route Frequency Provider Last Rate Last Admin    acetaminophen (TYLENOL) tablet 650 mg  650 mg Oral Q4H PRN Nikki Caceres MD   650 mg at 01/09/25 0228    alum & mag hydroxide-simethicone (MAALOX) suspension 30 mL  30 mL Oral Q4H PRN Nikki Caceres MD   30 mL at 10/17/24 0837    amLODIPine (NORVASC) tablet 10 mg  10 mg Oral Daily Presley Barrera MD   10 mg at 01/28/25 0811    atorvastatin (LIPITOR) tablet 40 mg  40 mg Oral Daily Nikki Caceres MD   40 mg at 01/28/25 0810    cholecalciferol (VITAMIN D3) capsule 1,250 mcg  1,250 mcg Oral Q7 Days Evelia Grimm DO   1,250 mcg at 01/21/25 1059    cinacalcet (SENSIPAR) tablet 30 mg  30 mg Oral Daily Presley Barrera MD   30 mg at 01/28/25 0810    cloNIDine (CATAPRES) tablet 0.1 mg  0.1 mg Oral BID Presley Barrera MD   0.1 mg at 01/28/25 0810    diclofenac (VOLTAREN) 1 % topical gel 2 g  2 g Topical 4x Daily PRN Rocío Lopez MD   2 g at 12/22/24 2032    furosemide (LASIX) tablet 40 mg  40 mg Oral Daily Presley Barrera MD   40 mg at 01/28/25 0810    gabapentin (NEURONTIN) capsule 100 mg  100 mg Oral Q6H PRN Nikki Caceres MD   100 mg at 12/24/24 0046    hydrocortisone (CORTAID) 0.5 % cream   Topical Daily PRN Savi Chamorro MD        Lidocaine (LIDOCARE) 4 % Patch 1 patch   1 patch Transdermal Q24H PRN Rocío Lopez MD   1 patch at 12/22/24 2023    lisinopril (ZESTRIL) tablet 40 mg  40 mg Oral Daily Presley Barrera MD   40 mg at 01/28/25 0810    melatonin tablet 3 mg  3 mg Oral At Bedtime Presley Barrera MD   3 mg at 01/27/25 2006    metoprolol succinate ER (TOPROL XL) 24 hr tablet 50 mg  50 mg Oral Daily Presley Barrera MD   50 mg at 01/28/25 0811    nicotine (NICODERM CQ) 14 MG/24HR 24 hr patch 1 patch  1 patch Transdermal Daily Evelia Grimm DO   1 patch at 01/27/25 0801    nicotine (NICORETTE) gum 2 mg  2 mg Buccal Q1H PRN González Garcia MD   2 mg at 10/24/24 1254    OLANZapine zydis (zyPREXA) ODT tab 5 mg  5 mg Oral At Bedtime González Garcia MD   5 mg at 01/27/25 2006    Or    OLANZapine (zyPREXA) injection 10 mg  10 mg Intramuscular At Bedtime González Garcia MD   10 mg at 01/12/25 2211    OLANZapine (zyPREXA) tablet 5 mg  5 mg Oral TID PRN Evelia Grimm DO   5 mg at 01/05/25 1645    Or    OLANZapine (zyPREXA) injection 5 mg  5 mg Intramuscular TID PRN Evelia Grimm DO        polyethylene glycol (MIRALAX) Packet 17 g  17 g Oral Daily PRN Nikki Caceres MD        senna-docusate (SENOKOT-S/PERICOLACE) 8.6-50 MG per tablet 1 tablet  1 tablet Oral Daily Evelia Grimm DO   1 tablet at 01/28/25 0810    traZODone (DESYREL) half-tab 25 mg  25 mg Oral At Bedtime Rocío Lopez MD   25 mg at 01/27/25 2006    traZODone (DESYREL) half-tab 25 mg  25 mg Oral At Bedtime PRN Evelia Grimm DO   25 mg at 12/24/24 0046     Labs and Imaging:  Data this admission:  - CBC unremarkable  - CMP unremarkable  - TSH normal  - UDS negative  - Vit D low  - Hgb A1c 5.9 (10/13/24)  - Lipids unremarkable  - Vit B12 normal  - Folate normal  - Urinalysis unremarkable  - EKG normal sinus rhythm, QTc 390 ms  - Head CT showed no acute changes  - HIV non reactive  - Treponema antibody non reactive  - ESR wnl   - Ceruloplasmin wnl   - BOOGIE negative  - Lyme negative     "  Parathyroid hormone:   85 (10/13)     Calcium:  11.4 (10/14) -> 10.3 (10/16) -> 9.8 (10/19) -> 10.6 (10/21) -> 10.3 (10/23)     Albumin:  4.3 (10/14) -->  4.3 (10/16) -> 4.1 (10/17) -> 4.2 (10/19) -> 4.5 (10/21) -> 4.3 (10/23)      Mental Status Exam:   Oriented to:  Person/Self  General:  Awake and Alert  Appearance:  appears stated age, Grooming is adequate, Dressed in own clothing, and overweight  Behavior/Attitude:  Calm and Cooperative  Eye Contact: Appropriate  Psychomotor: Normal and No evidence of tics, dystonia, or tardive dyskinesia  no catatonia present  Speech:  appropriate volume/tone  Language: Fluent in English with appropriate syntax and vocabulary.  Mood:  \"good\"   Affect:  appropriate and congruent with mood  Thought Process:  linear and coherent  Thought Content:   No SI/HI/AH/VH; No apparent delusions  Associations:  loose  Insight:  impaired due to neurocognitive disorder   Judgment:  impaired due to neurocognitive disorder  Impulse control: impaired  Attention Span:  inattentive  Concentration:   impaired by neurocognitive disorder  Recent and Remote Memory:   remote memory intact, recent memory impaired  Fund of Knowledge: average  Muscle Strength and Tone: normal  Gait and Station: Normal     Psychiatric Assessment     Estevan Aaron is a 64 year old male with previous psychiatric diagnoses of GEORGINA admitted from the ER on 10/12/2024 due to concern for HI and psychosis in the context of medical issues (hyperthyroidism, hypercalcemia), psychosocial stressors including with recent divorce and selling his home. This is the patient's first psychiatric hospitalization. The MSE on admission was pertinent for a thought process which was perseverative, circumstantial, tangential, disorganized and tangential; with rambling and looseness of associations. Psychological contributions to presentation included lack of insight. Social factors contributing to presentation included isolation, recent divorce, " selling his house, and moving from hotel to hotel. Biological contributions to presentation included a history of hyperparathyroidism with chronic hypercalcemia per charts as well as a history of methamphetamine use per collateral from ex-wife.     Per collateral from ex-wife, Santos has had paranoid ideations since they first met. He has always felt like people are out to get him or trying to rip him off. She says that he has had visual hallucinations (he will point things out that the rest of the family can't see) for a long time, but the family would just go along with him to avoid making him angry. This timeline and presentation could be consistent with diagnosis of paranoid personality disorder. Things began to worsen around the beginning of the COVID pandemic, when Santos became more isolated and the family started to notice cognitive issues such as impaired memory. Other things that could be contributing to his presentation is hyperparathyroidism with hypercalcemia. However, patient's calcium returned to normal limits on 10/16, and patient continues to have disorganized behavior unrelated to calcium elevation, so likely this is not a significant contributing factor to patient's presentation.      Currently, Santos is at times disoriented, but easily re-directable. Presents with some difficulty of word articulation, which contributes to his frustration when interacting with psych team, as he finds it difficult to explain himself. He is able to take care of his ADLs without much assistance and he is mostly independent in the unit. On the other hand, his confused behavior tend to increase in the evenings, seemingly related to  Neurocognitive Disorder diagnosis, and this could evolved to be his new baseline. Either way, Santos does not present as a danger to himself or other so his SIO was discontinued. Santos does not present with any new episodes of physical agitation and is able to attend groups and interact with peers  without major altercations. Patient currently is stable with current neuroleptic dose, patient probably wont benefit from any modification in current therapy.          Psychiatric Plan by Diagnosis     Today's changes:  - No medication changes.     # Major Neurocognitive Disorder  1. Medications:  - Olanzapine 5 mg at bedtime     3. Additional Plans:  - Patient will be treated in therapeutic milieu with appropriate individual and group therapies as described  - Pending memory facility placement.      # Unspecified anxiety vs Generalized Anxiety Disorder  - Monitor for symptoms.  - Fluoxetine held due to suspicion of ongoing manic symptoms     Psychiatric Hospital Course:      Estevan Aaron was admitted to [facility] on a 72 hour hold.   Medications:  PTA fluoxetine was held due to concern for worsening of zelalem   New medications started at the time of admission include Zyprexa.   Increased olanzapine 10 mg at bedtime was to 10 mg BID (10/14)   Increased olanzapine 10 mg BID to 10 mg during day and 15 mg at bedtime (10/15)  10/21: increased olanzapine pm dose from 15 to 20 mg  10/23: started melatonin 3 mg at 7 pm to help patient with circadian rhythm as he has been staying up throughout the night and sleeping a lot during the day and there is some concern for delirium   10/24: consulted anesthesia for MRI brain   10/28: MRI brain complete, decrease AM olanzapine from 10 to 5 mg  10/29: Morning olanzapine decreased to 2.5 mg, evening olanzapine decreased to 15 mg   11/1: Decreased evening olanzapine to 10 mg   11/4: Decreased evening olanzapine to 7.5 mg   11/5: Decreased evening olanzapine to 5 mg   11/11: Moved morning dose of olanzapine to the afternoon as patient appears more agitated in the afternoons/evenings  11/21: Started memantine 5 mg daily   11/26: Discontinued olanzapine 2.5 mg during the afternoon  12/2:   Discontinued memantine 5 mg, daily    The risks, benefits, alternatives, and side effects were  discussed and understood by the patient and other caregivers.     Medical Assessment and Plan     Medical diagnoses to be addressed this admission:    # Hip Pain  - New right hip pain, and mild pain in multiple joints. Moderate response to topical antiinflammatory gel and lidocaine patch.   - Medicine consulted for recommendations 12/20    # CHF  # Hypertension  - Continue PTA medications  Furosemide 40 mg daily  Lisinopril 40 mg daily  Metoprolol 50 mg daily  Amlodipine 10 mg daily  Clonidine 0.1 mg BID  - Pitting edema in lower extremities, not painful 3+: Medicine consulted for recommendations 12/20  - Weight gain of about 8 pounds in about 2 months currently 249lb as of 01/07/25    # Hyperlipidemia  - Continue PTA Atorvastatin 40 mg     # Primary Hyperparathyroidism  # Hypercalcemia, hypophosphoremia   Increased Ca level to 10.9 and decreased Ph level to 2.3 in the ED   - Consulted endocrinology, who started cinacalcet 30 mg BID on 10/13  - 10/16 endocrinology recommended continuing to trend calcium and albumin to make sure patient does not become hypocalcemic and recommended holding cinacalcet   - 10/17, calcium and albumin wnl, endo recommended cinacalcet 30 mg once daily   - 10/21, per endo continue cincaclcet and recheck calcium and albumin on October 24  - 10/23: per endo, patient needs to follow up outpatient for further management of hyperparathyroidism     # Rhinorrhea  1/15 currently multiple COVID infections on unit. No other symptoms of COVID noted. COVID PCR - on 1/13.    Medical course: Patient was physically examined by the ED prior to being transferred to the unit and was found to be medically stable and appropriate for admission.      Consults: Psychiatry, Endocrinology (follow-up of hyperthyroidism / hypercalcemia and hypertension), neurology     Checklist     Legal Status: Committed   [facility] meds: Haldol, Clozaril, Risperdal, Invega, Zyprexa, Seroquel, Abilify    Safety Assessment:   Behavioral  Orders   Procedures    Code 1 - Restrict to Unit    Routine Programming     As clinically indicated    Status 15     Every 15 minutes.    Status Individual Observation     Patient SIO status reviewed with team/RN.  Please also refer to RN/team documentation for add'l detail.    -SIO staff to monitor following which have contributed to patient being on SIO:  Patient intrusiveness to other patients  -Possible interventions SIO staff could use to support patient's treatment progress:  Redirection  -When following observed, team will review discontinuation of SIO:  Improvement in intrusive behavior.     Order Specific Question:   CONTINUOUS 24 hours / day     Answer:   Other     Order Specific Question:   Specify distance     Answer:   10 feet     Order Specific Question:   Indications for SIO     Answer:   Severe intrusiveness       Risk Assessment:  Risk for harm is low  Risk factors: impulsive and past behaviors  Protective factors: family      SIO: No    Disposition: Pending assessment for memory care facility.       Attestations     This patient was seen and discussed with my attending physician.  Evelia Grimm DO  PGY-1 Psychiatry Resident        Attestation:  This patient has been seen and evaluated by me, Pete Smith MD.  I have discussed this patient with the house staff team including the resident and/or medical student and I agree with the findings and plan in this note.    I have reviewed today's vital signs, medications, labs and imaging. Pete Smith MD , PhD.

## 2025-01-29 PROCEDURE — 250N000013 HC RX MED GY IP 250 OP 250 PS 637

## 2025-01-29 PROCEDURE — 124N000002 HC R&B MH UMMC

## 2025-01-29 PROCEDURE — 99232 SBSQ HOSP IP/OBS MODERATE 35: CPT | Mod: GC | Performed by: PSYCHIATRY & NEUROLOGY

## 2025-01-29 RX ADMIN — METOPROLOL SUCCINATE 50 MG: 50 TABLET, EXTENDED RELEASE ORAL at 08:15

## 2025-01-29 RX ADMIN — Medication 1 PATCH: at 08:16

## 2025-01-29 RX ADMIN — MELATONIN TAB 3 MG 3 MG: 3 TAB at 20:17

## 2025-01-29 RX ADMIN — ATORVASTATIN CALCIUM 40 MG: 20 TABLET, FILM COATED ORAL at 08:15

## 2025-01-29 RX ADMIN — FUROSEMIDE 40 MG: 40 TABLET ORAL at 08:16

## 2025-01-29 RX ADMIN — Medication 25 MG: at 20:16

## 2025-01-29 RX ADMIN — AMLODIPINE BESYLATE 10 MG: 5 TABLET ORAL at 08:15

## 2025-01-29 RX ADMIN — SENNOSIDES AND DOCUSATE SODIUM 1 TABLET: 50; 8.6 TABLET ORAL at 08:16

## 2025-01-29 RX ADMIN — CINACALCET 30 MG: 30 TABLET ORAL at 08:15

## 2025-01-29 RX ADMIN — CLONIDINE HYDROCHLORIDE 0.1 MG: 0.1 TABLET ORAL at 20:17

## 2025-01-29 RX ADMIN — CLONIDINE HYDROCHLORIDE 0.1 MG: 0.1 TABLET ORAL at 08:16

## 2025-01-29 RX ADMIN — OLANZAPINE 5 MG: 5 TABLET, ORALLY DISINTEGRATING ORAL at 20:17

## 2025-01-29 RX ADMIN — LISINOPRIL 40 MG: 10 TABLET ORAL at 08:15

## 2025-01-29 ASSESSMENT — ACTIVITIES OF DAILY LIVING (ADL)
ADLS_ACUITY_SCORE: 66
LAUNDRY: WITH SUPERVISION
ADLS_ACUITY_SCORE: 66
HYGIENE/GROOMING: INDEPENDENT
ADLS_ACUITY_SCORE: 66
DRESS: INDEPENDENT;STREET CLOTHES
ADLS_ACUITY_SCORE: 66
ORAL_HYGIENE: PROMPTS;INDEPENDENT
ADLS_ACUITY_SCORE: 66

## 2025-01-29 NOTE — PLAN OF CARE
"  Problem: Psychotic Signs/Symptoms  Goal: Improved Behavioral Control (Psychotic Signs/Symptoms)  Outcome: Progressing        Problem: Manic or Hypomanic Signs/Symptoms  Goal: Improved Impulse Control (Manic/Hypomanic Signs/Symptoms)  Outcome: Progressing     Problem: Sleep Disturbance  Goal: Adequate Sleep/Rest  1/28/2025 1954 by Tamie Fraser RN  Outcome: Progressing  1/28/2025 1952 by Tamie Fraser RN  Outcome: Progressing   Goal Outcome Evaluation:    Pt was visible in  the milieu watching TV with select peers. Presented with  a flat affect,friendly upon approach.  Pt denied pain or discomfort. Denies all mental health and psych symptoms. Hygiene is appropriate. Food and fluid intake adequate. Pt was not compliant with medication. Pt stated, \"You guys are trying to kill me\". Pt finally took his medications after multiple approach. Behavioral out burst noted this shift.  "

## 2025-01-29 NOTE — PLAN OF CARE
Problem: Psychotic Signs/Symptoms  Goal: Improved Behavioral Control (Psychotic Signs/Symptoms)  Outcome: Not Progressing   Goal Outcome Evaluation:    Plan of Care Reviewed With: patient    Patient visible in milieu presented with flat affect, mood irritable and labile, disoriented x 3 and is requesting for his hunting license. Patient was difficult to redirect as he continue to ask staff to show him his hunting license, he looked at the belonging sheet and became verbally aggressive towards staff. Patient BP was elevated at the start of the shift at 171/87 HR 64, he is compliant with medication regimen, writer approached patient an hour and half to recheck his blood pressure to assess medication effectiveness, patient declined and got upset. Patient interactive with peers bit easily irritable with staffs, intake and hygiene is adequate. He has been visible in lounge area sleeping on and off.

## 2025-01-29 NOTE — PLAN OF CARE
Team Note Due:  Monday    Assessment/Intervention/Current Symtoms and Care Coordination:  Chart review and met with team, discussed pt progress, symptomology, and response to treatment.  Discussed the discharge plan and any potential impediments to discharge. Clifford Aaron was emergency guardian/conservator until 01/20/2025. A petition for permanent guardianship/conservatorship has been filed.    No changes. Awaiting update on guardianship status to move into [facility].    Discharge Plan or Goal:  Memory care facility     Barriers to Discharge:  Patient requires further psychiatric stabilization due to current symptomology, medication management with changes subject to provider, coordination with outside supports, and aftercare planning. Pt is under civil commitment.     Referral Status:  Memory Care approved:  [facility]. Tour completed 11/30. Per Clifford on 1/8, she would like to move forward with a referral. Approved to move in February but waiting on guardianship to be re-established before a discharge date can be set.    Memory Care facilities currently in process:  [facility]. Tour completed 11/27.  Vera (Exec Director): [phone]  [facility]. Tour scheduled 1/8/25. Have immediate openings and will accept EW. Family not requesting referral yet.  [facility]. Per Clifford on 1/20, she would like to move forward with a referral. Records sent 1/20.  [email or web address]    Memory Care facilities pending family review:  [facility].  [facility].  [facility].  [facility].  [facility].    Memory Care facilities declined:  [facility] (private pay only, no EW).  [facility] [address] (private pay only, no EW).  [facility] (no openings).  [facility]. Tour completed 11/29.  Records sent 12/2/24. Declined 12/19/24 (don't feel they are an appropriate facility for pt's needs).  Lorraine (): [email or web address]; [phone]  Isatu (director of nursing): [email or web address]  [facility].  Denied 12/26 (concerned about dementia with psychosis, history of hallucinations, high elopement risk, still on 1:1).  Nikki Noel: [email or web address]; Office [phone], Fax [phone]   [facility]. Not accepting EW as of 1/7/25.    Legal Status:  MI Commitment with [facility]  File Number: [id number]  Start and expiration date of commitment: 10/24/24 - 04/24/25    [facility] meds: Haldol, Clozaril, Risperdal, Invega, Zyprexa, Seroquel, Abilify    PPS/CM:  Shelby Chowdary: [phone]  [email or web address].    Contacts:  Maria C Aaron (Daughter): [phone]   Clifford Aaron (ex-wife): [phone]     No Del Angel (guardianship/conservatorship ): [phone]  [email or web address]    Derrell Duff ([employer] ): [phone]  [email or web address]     Upcoming Meetings and Dates/Important Information and next steps:  Schedule ambulance transport on 1/31 for Monday 2/3   Coordinate discharge/paperwork with [facility]  Send PD/Discharge Summary to   Send PD/COS to [facility]    Provisional Discharge and Change of Status needed at discharge

## 2025-01-29 NOTE — PLAN OF CARE
Goal Outcome Evaluation:         Problem: Sleep Disturbance  Goal: Adequate Sleep/Rest  Outcome: Progressing         Pt mostly awake during the night shift and slept for only 2.5 hours but most of the awake hours in his room. Pt declined prn sleeping medication.  No outburst behavior observed or reported and pt observed breathing in an even pattern and 15 minutes checks completed per protocol.

## 2025-01-29 NOTE — PROGRESS NOTES
"  ----------------------------------------------------------------------------------------------------------  [facility]  Psychiatry Progress Note  Hospital Day #109     Interim History:     The patient's care was discussed with the treatment team and chart notes were reviewed.    Identifier: Estevan Aaron is a 64 year old male with previous psychiatric diagnoses of  generalized anxiety disorder, Neurocognitive disorder, admitted from the ED 10/12/2024 due to concern for HI and psychosis in the context of medical issues (hyperthyroidism, hypercalcemia) psychosocial stressors including family dynamics with recent possible divorce.    Sleep: 2.5 hours (01/29/25 0556)  Scheduled medications: Took all scheduled medications as prescribed  Psychiatric PRN medications:  none     Staff Report:   No acute events over night. Often confused and disoriented to place and situation but no behavioral concerns.      Subjective:     Patient Interview:  Santos was interviewed in the milieu. \"I thought we wiped out the red dot.\" Says multiple incoherent sentences with no clear context. Initially says he wants to talk about \"something\" but unsure what it is he needs to talk about. Tells the provider \"you won't let me know... please leave me alone.\" Notes of frustration of being in the unit. Ended the interview and said \"just go away...\"     ROS:  See above     Objective:     Vitals:  BP (!) 171/87 (BP Location: Right arm, Patient Position: Sitting, Cuff Size: Adult Large)   Pulse 64   Temp 98.4  F (36.9  C) (Oral)   Resp 16   Wt 114.3 kg (251 lb 14.4 oz)   SpO2 95%   BMI 40.66 kg/m      Allergies:  No Known Allergies    Current Medications:  Scheduled:  Current Facility-Administered Medications   Medication Dose Route Frequency Provider Last Rate Last Admin    acetaminophen (TYLENOL) tablet 650 mg  650 mg Oral Q4H PRN Nikki Caceres MD   650 mg at 01/09/25 0228    alum & mag " hydroxide-simethicone (MAALOX) suspension 30 mL  30 mL Oral Q4H PRN Nikki Caceres MD   30 mL at 10/17/24 0837    amLODIPine (NORVASC) tablet 10 mg  10 mg Oral Daily Presley Barrera MD   10 mg at 01/28/25 0811    atorvastatin (LIPITOR) tablet 40 mg  40 mg Oral Daily Nikki Caceres MD   40 mg at 01/28/25 0810    cholecalciferol (VITAMIN D3) capsule 1,250 mcg  1,250 mcg Oral Q7 Days Evelia Grimm DO   1,250 mcg at 01/28/25 1146    cinacalcet (SENSIPAR) tablet 30 mg  30 mg Oral Daily Presley Barrera MD   30 mg at 01/28/25 0810    cloNIDine (CATAPRES) tablet 0.1 mg  0.1 mg Oral BID Presley Barrera MD   0.1 mg at 01/28/25 2026    diclofenac (VOLTAREN) 1 % topical gel 2 g  2 g Topical 4x Daily PRN Rocío Lopez MD   2 g at 12/22/24 2032    furosemide (LASIX) tablet 40 mg  40 mg Oral Daily Presley Barrera MD   40 mg at 01/28/25 0810    gabapentin (NEURONTIN) capsule 100 mg  100 mg Oral Q6H PRN Nikki Caceres MD   100 mg at 12/24/24 0046    hydrocortisone (CORTAID) 0.5 % cream   Topical Daily PRN Savi Chamorro MD        Lidocaine (LIDOCARE) 4 % Patch 1 patch  1 patch Transdermal Q24H PRN Rocío Lopez MD   1 patch at 12/22/24 2023    lisinopril (ZESTRIL) tablet 40 mg  40 mg Oral Daily Presley Barrera MD   40 mg at 01/28/25 0810    melatonin tablet 3 mg  3 mg Oral At Bedtime Presley Barrera MD   3 mg at 01/28/25 2027    metoprolol succinate ER (TOPROL XL) 24 hr tablet 50 mg  50 mg Oral Daily Presley Barrera MD   50 mg at 01/28/25 0811    nicotine (NICODERM CQ) 14 MG/24HR 24 hr patch 1 patch  1 patch Transdermal Daily Evelia Grimm DO   1 patch at 01/28/25 0825    nicotine (NICORETTE) gum 2 mg  2 mg Buccal Q1H PRN González Garcia MD   2 mg at 10/24/24 1254    OLANZapine zydis (zyPREXA) ODT tab 5 mg  5 mg Oral At Bedtime González Garcia MD   5 mg at 01/28/25 2028    Or    OLANZapine (zyPREXA) injection 10 mg  10 mg Intramuscular At Bedtime González Garcia,  MD   10 mg at 01/12/25 2211    OLANZapine (zyPREXA) tablet 5 mg  5 mg Oral TID PRN Evelia Grimm DO   5 mg at 01/05/25 1645    Or    OLANZapine (zyPREXA) injection 5 mg  5 mg Intramuscular TID PRN Evelia Grimm DO        polyethylene glycol (MIRALAX) Packet 17 g  17 g Oral Daily PRN Nikki Caceres MD        senna-docusate (SENOKOT-S/PERICOLACE) 8.6-50 MG per tablet 1 tablet  1 tablet Oral Daily Evelia Grimm DO   1 tablet at 01/28/25 0810    traZODone (DESYREL) half-tab 25 mg  25 mg Oral At Bedtime Rocío Lopez MD   25 mg at 01/28/25 2028    traZODone (DESYREL) half-tab 25 mg  25 mg Oral At Bedtime PRN Evelia Grimm DO   25 mg at 12/24/24 0046       PRN:  Current Facility-Administered Medications   Medication Dose Route Frequency Provider Last Rate Last Admin    acetaminophen (TYLENOL) tablet 650 mg  650 mg Oral Q4H PRN Nikki Caceres MD   650 mg at 01/09/25 0228    alum & mag hydroxide-simethicone (MAALOX) suspension 30 mL  30 mL Oral Q4H PRN Nikki Caceres MD   30 mL at 10/17/24 0837    amLODIPine (NORVASC) tablet 10 mg  10 mg Oral Daily Presley Barrera MD   10 mg at 01/28/25 0811    atorvastatin (LIPITOR) tablet 40 mg  40 mg Oral Daily Nikki Caceres MD   40 mg at 01/28/25 0810    cholecalciferol (VITAMIN D3) capsule 1,250 mcg  1,250 mcg Oral Q7 Days Evelia Grimm DO   1,250 mcg at 01/28/25 1146    cinacalcet (SENSIPAR) tablet 30 mg  30 mg Oral Daily Presley Barrera MD   30 mg at 01/28/25 0810    cloNIDine (CATAPRES) tablet 0.1 mg  0.1 mg Oral BID Presley Barrera MD   0.1 mg at 01/28/25 2026    diclofenac (VOLTAREN) 1 % topical gel 2 g  2 g Topical 4x Daily PRN Rocío Lopezpe, MD   2 g at 12/22/24 2032    furosemide (LASIX) tablet 40 mg  40 mg Oral Daily Presley Barrera MD   40 mg at 01/28/25 0810    gabapentin (NEURONTIN) capsule 100 mg  100 mg Oral Q6H PRN Nikki Caceres MD   100 mg at 12/24/24 0046    hydrocortisone (CORTAID) 0.5 % cream   Topical Daily  PRN Savi Chamorro MD        Lidocaine (LIDOCARE) 4 % Patch 1 patch  1 patch Transdermal Q24H PRN Rocío Lopez MD   1 patch at 12/22/24 2023    lisinopril (ZESTRIL) tablet 40 mg  40 mg Oral Daily Presley Barrera MD   40 mg at 01/28/25 0810    melatonin tablet 3 mg  3 mg Oral At Bedtime Presley Barrera MD   3 mg at 01/28/25 2027    metoprolol succinate ER (TOPROL XL) 24 hr tablet 50 mg  50 mg Oral Daily Presley Barrera MD   50 mg at 01/28/25 0811    nicotine (NICODERM CQ) 14 MG/24HR 24 hr patch 1 patch  1 patch Transdermal Daily Evelia Grimm DO   1 patch at 01/28/25 0825    nicotine (NICORETTE) gum 2 mg  2 mg Buccal Q1H PRN González Garcia MD   2 mg at 10/24/24 1254    OLANZapine zydis (zyPREXA) ODT tab 5 mg  5 mg Oral At Bedtime González Garcia MD   5 mg at 01/28/25 2028    Or    OLANZapine (zyPREXA) injection 10 mg  10 mg Intramuscular At Bedtime González Garcia MD   10 mg at 01/12/25 2211    OLANZapine (zyPREXA) tablet 5 mg  5 mg Oral TID PRN Evelia Grimm DO   5 mg at 01/05/25 1645    Or    OLANZapine (zyPREXA) injection 5 mg  5 mg Intramuscular TID PRN Evelia Grimm DO        polyethylene glycol (MIRALAX) Packet 17 g  17 g Oral Daily PRN Nikki Caceres MD        senna-docusate (SENOKOT-S/PERICOLACE) 8.6-50 MG per tablet 1 tablet  1 tablet Oral Daily Evelia Grimm DO   1 tablet at 01/28/25 0810    traZODone (DESYREL) half-tab 25 mg  25 mg Oral At Bedtime Rocío Lopez MD   25 mg at 01/28/25 2028    traZODone (DESYREL) half-tab 25 mg  25 mg Oral At Bedtime PRN Evelia Grimm DO   25 mg at 12/24/24 0046     Labs and Imaging:  Data this admission:  - CBC unremarkable  - CMP unremarkable  - TSH normal  - UDS negative  - Vit D low  - Hgb A1c 5.9 (10/13/24)  - Lipids unremarkable  - Vit B12 normal  - Folate normal  - Urinalysis unremarkable  - EKG normal sinus rhythm, QTc 390 ms  - Head CT showed no acute changes  - HIV non reactive  - Treponema antibody non  "reactive  - ESR wnl   - Ceruloplasmin wnl   - BOOGIE negative  - Lyme negative      Parathyroid hormone:   85 (10/13)     Calcium:  11.4 (10/14) -> 10.3 (10/16) -> 9.8 (10/19) -> 10.6 (10/21) -> 10.3 (10/23)     Albumin:  4.3 (10/14) -->  4.3 (10/16) -> 4.1 (10/17) -> 4.2 (10/19) -> 4.5 (10/21) -> 4.3 (10/23)      Mental Status Exam:   Oriented to:  Person/Self  General:  Awake and Alert  Appearance:  appears stated age, Grooming is adequate, Dressed in own clothing, and overweight  Behavior/Attitude:  Disengaged and Accusatory  Eye Contact: Staring  Psychomotor: Normal and No evidence of tics, dystonia, or tardive dyskinesia  no catatonia present  Speech:  appropriate volume/tone  Language: Fluent in English with appropriate syntax and vocabulary.  Mood:  \"good\"   Affect:  appropriate and congruent with mood  Thought Process:  disorganized, confused, and incoherent  Thought Content:   No SI/HI/AH/VH; No apparent delusions  Associations:  loose  Insight:  impaired due to neurocognitive disorder   Judgment:  impaired due to neurocognitive disorder  Impulse control: impaired  Attention Span:  inattentive  Concentration:   impaired by neurocognitive disorder  Recent and Remote Memory:   remote memory intact, recent memory impaired  Fund of Knowledge: average  Muscle Strength and Tone: normal  Gait and Station: Normal     Psychiatric Assessment     Estevan Aaron is a 64 year old male with previous psychiatric diagnoses of GEORGINA admitted from the ER on 10/12/2024 due to concern for HI and psychosis in the context of medical issues (hyperthyroidism, hypercalcemia), psychosocial stressors including with recent divorce and selling his home. This is the patient's first psychiatric hospitalization. The MSE on admission was pertinent for a thought process which was perseverative, circumstantial, tangential, disorganized and tangential; with rambling and looseness of associations. Psychological contributions to presentation included " lack of insight. Social factors contributing to presentation included isolation, recent divorce, selling his house, and moving from hotel to hotel. Biological contributions to presentation included a history of hyperparathyroidism with chronic hypercalcemia per charts as well as a history of methamphetamine use per collateral from ex-wife.     Per collateral from ex-wife, Santos has had paranoid ideations since they first met. He has always felt like people are out to get him or trying to rip him off. She says that he has had visual hallucinations (he will point things out that the rest of the family can't see) for a long time, but the family would just go along with him to avoid making him angry. This timeline and presentation could be consistent with diagnosis of paranoid personality disorder. Things began to worsen around the beginning of the COVID pandemic, when Santos became more isolated and the family started to notice cognitive issues such as impaired memory. Other things that could be contributing to his presentation is hyperparathyroidism with hypercalcemia. However, patient's calcium returned to normal limits on 10/16, and patient continues to have disorganized behavior unrelated to calcium elevation, so likely this is not a significant contributing factor to patient's presentation.      Currently, Santos is at times disoriented, but easily re-directable. Presents with some difficulty of word articulation, which contributes to his frustration when interacting with psych team, as he finds it difficult to explain himself. He is able to take care of his ADLs without much assistance and he is mostly independent in the unit. On the other hand, his confused behavior tend to increase in the evenings, seemingly related to  Neurocognitive Disorder diagnosis, and this could evolved to be his new baseline. Either way, Santos does not present as a danger to himself or other so his SIO was discontinued. Santos does not present with  any new episodes of physical agitation and is able to attend groups and interact with peers without major altercations. Patient currently is stable with current neuroleptic dose, patient probably wont benefit from any modification in current therapy.          Psychiatric Plan by Diagnosis     Today's changes:  - No medication changes.     # Major Neurocognitive Disorder  1. Medications:  - Olanzapine 5 mg at bedtime     3. Additional Plans:  - Patient will be treated in therapeutic milieu with appropriate individual and group therapies as described  - Pending memory facility placement.      # Unspecified anxiety vs Generalized Anxiety Disorder  - Monitor for symptoms.  - Fluoxetine held due to suspicion of ongoing manic symptoms     Psychiatric Hospital Course:      Estevan Aaron was admitted to [facility] on a 72 hour hold.   Medications:  PTA fluoxetine was held due to concern for worsening of zelalem   New medications started at the time of admission include Zyprexa.   Increased olanzapine 10 mg at bedtime was to 10 mg BID (10/14)   Increased olanzapine 10 mg BID to 10 mg during day and 15 mg at bedtime (10/15)  10/21: increased olanzapine pm dose from 15 to 20 mg  10/23: started melatonin 3 mg at 7 pm to help patient with circadian rhythm as he has been staying up throughout the night and sleeping a lot during the day and there is some concern for delirium   10/24: consulted anesthesia for MRI brain   10/28: MRI brain complete, decrease AM olanzapine from 10 to 5 mg  10/29: Morning olanzapine decreased to 2.5 mg, evening olanzapine decreased to 15 mg   11/1: Decreased evening olanzapine to 10 mg   11/4: Decreased evening olanzapine to 7.5 mg   11/5: Decreased evening olanzapine to 5 mg   11/11: Moved morning dose of olanzapine to the afternoon as patient appears more agitated in the afternoons/evenings  11/21: Started memantine 5 mg daily   11/26: Discontinued olanzapine 2.5 mg during the afternoon  12/2:    Discontinued memantine 5 mg, daily    The risks, benefits, alternatives, and side effects were discussed and understood by the patient and other caregivers.     Medical Assessment and Plan     Medical diagnoses to be addressed this admission:    # Hip Pain  - New right hip pain, and mild pain in multiple joints. Moderate response to topical antiinflammatory gel and lidocaine patch.   - Medicine consulted for recommendations 12/20    # CHF  # Hypertension  - Continue PTA medications  Furosemide 40 mg daily  Lisinopril 40 mg daily  Metoprolol 50 mg daily  Amlodipine 10 mg daily  Clonidine 0.1 mg BID  - Pitting edema in lower extremities, not painful 3+: Medicine consulted for recommendations 12/20  - Weight gain of about 8 pounds in about 2 months currently 249lb as of 01/07/25    # Hyperlipidemia  - Continue PTA Atorvastatin 40 mg     # Primary Hyperparathyroidism  # Hypercalcemia, hypophosphoremia   Increased Ca level to 10.9 and decreased Ph level to 2.3 in the ED   - Consulted endocrinology, who started cinacalcet 30 mg BID on 10/13  - 10/16 endocrinology recommended continuing to trend calcium and albumin to make sure patient does not become hypocalcemic and recommended holding cinacalcet   - 10/17, calcium and albumin wnl, endo recommended cinacalcet 30 mg once daily   - 10/21, per endo continue cincaclcet and recheck calcium and albumin on October 24  - 10/23: per endo, patient needs to follow up outpatient for further management of hyperparathyroidism     # Rhinorrhea  1/15 currently multiple COVID infections on unit. No other symptoms of COVID noted. COVID PCR - on 1/13.    Medical course: Patient was physically examined by the ED prior to being transferred to the unit and was found to be medically stable and appropriate for admission.      Consults: Psychiatry, Endocrinology (follow-up of hyperthyroidism / hypercalcemia and hypertension), neurology     Checklist     Legal Status: Committed   [facility] meds:  Haldol, Clozaril, Risperdal, Invega, Zyprexa, Seroquel, Abilify    Safety Assessment:   Behavioral Orders   Procedures    Code 1 - Restrict to Unit    Routine Programming     As clinically indicated    Status 15     Every 15 minutes.    Status Individual Observation     Patient SIO status reviewed with team/RN.  Please also refer to RN/team documentation for add'l detail.    -SIO staff to monitor following which have contributed to patient being on SIO:  Patient intrusiveness to other patients  -Possible interventions SIO staff could use to support patient's treatment progress:  Redirection  -When following observed, team will review discontinuation of SIO:  Improvement in intrusive behavior.     Order Specific Question:   CONTINUOUS 24 hours / day     Answer:   Other     Order Specific Question:   Specify distance     Answer:   10 feet     Order Specific Question:   Indications for SIO     Answer:   Severe intrusiveness       Risk Assessment:  Risk for harm is low  Risk factors: impulsive and past behaviors  Protective factors: family      SIO: No    Disposition: Pending assessment for memory care facility.       Attestations     This patient was seen and discussed with my attending physician.  Evelia Grimm DO  PGY-1 Psychiatry Resident      Attestation:  This patient has been seen and evaluated by me, Pete Smith MD.  I have discussed this patient with the house staff team including the resident and/or medical student and I agree with the findings and plan in this note.    I have reviewed today's vital signs, medications, labs and imaging. Pete Smith MD , PhD.

## 2025-01-29 NOTE — PLAN OF CARE
BEH IP Unit Acuity Rating Score (UARS)  Patient is given one point for every criteria they meet.    CRITERIA SCORING   On a 72 hour hold, court hold, committed, stay of commitment, or revocation. 1    Patient LOS on BEH unit exceeds 20 days. 1  LOS: 109   Patient under guardianship, 55+, otherwise medically complex, or under age 11. 1   Suicide ideation without relief of precipitating factors. 0   Current plan for suicide. 0   Current plan for homicide. 0   Imminent risk or actual attempt to seriously harm another without relief of factors precipitating the attempt. 1   Severe dysfunction in daily living (ex: complete neglect for self care, extreme disruption in vegetative function, extreme deterioration in social interactions). 1   Recent (last 7 days) or current physical aggression in the ED or on unit. 0   Restraints or seclusion episode in past 72 hours. 0   Recent (last 7 days) or current verbal aggression, agitation, yelling, etc., while in the ED or unit. 0   Active psychosis. 1   Need for constant or near constant redirection (from leaving, from others, etc).  0   Intrusive or disruptive behaviors. 0   Patient requires 3 or more hours of individualized nursing care per 8-hour shift (i.e. for ADLs, meds, therapeutic interventions). 0   TOTAL 6

## 2025-01-30 PROCEDURE — 250N000013 HC RX MED GY IP 250 OP 250 PS 637

## 2025-01-30 PROCEDURE — 124N000002 HC R&B MH UMMC

## 2025-01-30 PROCEDURE — 99232 SBSQ HOSP IP/OBS MODERATE 35: CPT | Mod: GC | Performed by: PSYCHIATRY & NEUROLOGY

## 2025-01-30 RX ADMIN — SENNOSIDES AND DOCUSATE SODIUM 1 TABLET: 50; 8.6 TABLET ORAL at 08:45

## 2025-01-30 RX ADMIN — CINACALCET 30 MG: 30 TABLET ORAL at 08:45

## 2025-01-30 RX ADMIN — Medication 1 PATCH: at 08:47

## 2025-01-30 RX ADMIN — OLANZAPINE 5 MG: 5 TABLET, ORALLY DISINTEGRATING ORAL at 20:39

## 2025-01-30 RX ADMIN — MELATONIN TAB 3 MG 3 MG: 3 TAB at 20:39

## 2025-01-30 RX ADMIN — AMLODIPINE BESYLATE 10 MG: 5 TABLET ORAL at 08:44

## 2025-01-30 RX ADMIN — ATORVASTATIN CALCIUM 40 MG: 20 TABLET, FILM COATED ORAL at 08:45

## 2025-01-30 RX ADMIN — LISINOPRIL 40 MG: 10 TABLET ORAL at 08:44

## 2025-01-30 RX ADMIN — CLONIDINE HYDROCHLORIDE 0.1 MG: 0.1 TABLET ORAL at 08:45

## 2025-01-30 RX ADMIN — CLONIDINE HYDROCHLORIDE 0.1 MG: 0.1 TABLET ORAL at 20:39

## 2025-01-30 RX ADMIN — Medication 25 MG: at 20:39

## 2025-01-30 RX ADMIN — FUROSEMIDE 40 MG: 40 TABLET ORAL at 08:45

## 2025-01-30 ASSESSMENT — ACTIVITIES OF DAILY LIVING (ADL)
ORAL_HYGIENE: INDEPENDENT
ADLS_ACUITY_SCORE: 66
DRESS: INDEPENDENT
ADLS_ACUITY_SCORE: 66
ADLS_ACUITY_SCORE: 66
ORAL_HYGIENE: INDEPENDENT
ADLS_ACUITY_SCORE: 66
DRESS: INDEPENDENT
ADLS_ACUITY_SCORE: 66
HYGIENE/GROOMING: INDEPENDENT
ADLS_ACUITY_SCORE: 66
HYGIENE/GROOMING: INDEPENDENT
ADLS_ACUITY_SCORE: 66

## 2025-01-30 NOTE — PROGRESS NOTES
"  ----------------------------------------------------------------------------------------------------------  [facility]  Psychiatry Progress Note  Hospital Day #110     Interim History:     The patient's care was discussed with the treatment team and chart notes were reviewed.    Identifier: Estevan Aaron is a 64 year old male with previous psychiatric diagnoses of  generalized anxiety disorder, Neurocognitive disorder, admitted from the ED 10/12/2024 due to concern for HI and psychosis in the context of medical issues (hyperthyroidism, hypercalcemia) psychosocial stressors including family dynamics with recent possible divorce.    Sleep: 4.25 hours (01/30/25 0600)  Scheduled medications: Took all scheduled medications as prescribed  Psychiatric PRN medications:  none      Staff Report:   No acute events over night. Was visible in the milieu, in the lounge watching TV, minimally socializing with select peers. Calm mood with flat effect. Denies any mental health problems.      Subjective:     Patient Interview:  Santos was interviewed in his room. Asked if the provider took a picture of the branches over his head yesterday and wondered if the \"planned on landing there\". Asked us to \"tell me my life\" regarding why and how he is here. Mentions \"[employer]\", his \"best adry\"doing something about his barn and needing drivers.     ROS:  See above     Objective:     Vitals:  /72 (BP Location: Right arm, Patient Position: Sitting, Cuff Size: Adult Regular)   Pulse 62   Temp 98.7  F (37.1  C) (Oral)   Resp 16   Wt 114.3 kg (251 lb 14.4 oz)   SpO2 95%   BMI 40.66 kg/m      Allergies:  No Known Allergies    Current Medications:  Scheduled:  Current Facility-Administered Medications   Medication Dose Route Frequency Provider Last Rate Last Admin    acetaminophen (TYLENOL) tablet 650 mg  650 mg Oral Q4H PRN Nikki Caceres MD   650 mg at 01/09/25 0228    alum & mag " hydroxide-simethicone (MAALOX) suspension 30 mL  30 mL Oral Q4H PRN Nikki Caceres MD   30 mL at 10/17/24 0837    amLODIPine (NORVASC) tablet 10 mg  10 mg Oral Daily Presley Barrera MD   10 mg at 01/29/25 0815    atorvastatin (LIPITOR) tablet 40 mg  40 mg Oral Daily Nikki Caceres MD   40 mg at 01/29/25 0815    cholecalciferol (VITAMIN D3) capsule 1,250 mcg  1,250 mcg Oral Q7 Days Evelia Grimm DO   1,250 mcg at 01/28/25 1146    cinacalcet (SENSIPAR) tablet 30 mg  30 mg Oral Daily Presley Barrera MD   30 mg at 01/29/25 0815    cloNIDine (CATAPRES) tablet 0.1 mg  0.1 mg Oral BID Presley Barrera MD   0.1 mg at 01/29/25 2017    diclofenac (VOLTAREN) 1 % topical gel 2 g  2 g Topical 4x Daily PRN Rocío Lopez MD   2 g at 12/22/24 2032    furosemide (LASIX) tablet 40 mg  40 mg Oral Daily Presley Barrera MD   40 mg at 01/29/25 0816    gabapentin (NEURONTIN) capsule 100 mg  100 mg Oral Q6H PRN Nikki Caceres MD   100 mg at 12/24/24 0046    hydrocortisone (CORTAID) 0.5 % cream   Topical Daily PRN Savi Chamorro MD        Lidocaine (LIDOCARE) 4 % Patch 1 patch  1 patch Transdermal Q24H PRN Rocío Lopez MD   1 patch at 12/22/24 2023    lisinopril (ZESTRIL) tablet 40 mg  40 mg Oral Daily Presley Barrera MD   40 mg at 01/29/25 0815    melatonin tablet 3 mg  3 mg Oral At Bedtime Presley Barrera MD   3 mg at 01/29/25 2017    metoprolol succinate ER (TOPROL XL) 24 hr tablet 50 mg  50 mg Oral Daily Presley Barrera MD   50 mg at 01/29/25 0815    nicotine (NICODERM CQ) 14 MG/24HR 24 hr patch 1 patch  1 patch Transdermal Daily Evelia Grimm DO   1 patch at 01/29/25 0816    nicotine (NICORETTE) gum 2 mg  2 mg Buccal Q1H PRN González Garcia MD   2 mg at 10/24/24 1254    OLANZapine zydis (zyPREXA) ODT tab 5 mg  5 mg Oral At Bedtime González Garcia MD   5 mg at 01/29/25 2017    Or    OLANZapine (zyPREXA) injection 10 mg  10 mg Intramuscular At Bedtime González Garcia,  MD   10 mg at 01/12/25 2211    OLANZapine (zyPREXA) tablet 5 mg  5 mg Oral TID PRN Evelia Grimm DO   5 mg at 01/05/25 1645    Or    OLANZapine (zyPREXA) injection 5 mg  5 mg Intramuscular TID PRN Evelia Grimm DO        polyethylene glycol (MIRALAX) Packet 17 g  17 g Oral Daily PRN Nikki Caceres MD        senna-docusate (SENOKOT-S/PERICOLACE) 8.6-50 MG per tablet 1 tablet  1 tablet Oral Daily Evelia Grimm DO   1 tablet at 01/29/25 0816    traZODone (DESYREL) half-tab 25 mg  25 mg Oral At Bedtime Rocío Lopez MD   25 mg at 01/29/25 2016    traZODone (DESYREL) half-tab 25 mg  25 mg Oral At Bedtime PRN Evelia Grimm DO   25 mg at 12/24/24 0046       PRN:  Current Facility-Administered Medications   Medication Dose Route Frequency Provider Last Rate Last Admin    acetaminophen (TYLENOL) tablet 650 mg  650 mg Oral Q4H PRN Nikki Caceres MD   650 mg at 01/09/25 0228    alum & mag hydroxide-simethicone (MAALOX) suspension 30 mL  30 mL Oral Q4H PRN Nikki Caceres MD   30 mL at 10/17/24 0837    amLODIPine (NORVASC) tablet 10 mg  10 mg Oral Daily Presley Barrera MD   10 mg at 01/29/25 0815    atorvastatin (LIPITOR) tablet 40 mg  40 mg Oral Daily Nikki Caceres MD   40 mg at 01/29/25 0815    cholecalciferol (VITAMIN D3) capsule 1,250 mcg  1,250 mcg Oral Q7 Days Evelia Grimm DO   1,250 mcg at 01/28/25 1146    cinacalcet (SENSIPAR) tablet 30 mg  30 mg Oral Daily Presley Barrera MD   30 mg at 01/29/25 0815    cloNIDine (CATAPRES) tablet 0.1 mg  0.1 mg Oral BID Presley Barrera MD   0.1 mg at 01/29/25 2017    diclofenac (VOLTAREN) 1 % topical gel 2 g  2 g Topical 4x Daily PRN Rocío Lopez MD   2 g at 12/22/24 2032    furosemide (LASIX) tablet 40 mg  40 mg Oral Daily Presley Barrera MD   40 mg at 01/29/25 0816    gabapentin (NEURONTIN) capsule 100 mg  100 mg Oral Q6H PRN Nikki Caceres MD   100 mg at 12/24/24 0046    hydrocortisone (CORTAID) 0.5 % cream   Topical Daily  PRN Savi Chamorro MD        Lidocaine (LIDOCARE) 4 % Patch 1 patch  1 patch Transdermal Q24H PRN Rocío Lopez MD   1 patch at 12/22/24 2023    lisinopril (ZESTRIL) tablet 40 mg  40 mg Oral Daily Presley Barrera MD   40 mg at 01/29/25 0815    melatonin tablet 3 mg  3 mg Oral At Bedtime Presley Barrera MD   3 mg at 01/29/25 2017    metoprolol succinate ER (TOPROL XL) 24 hr tablet 50 mg  50 mg Oral Daily Presley Barrera MD   50 mg at 01/29/25 0815    nicotine (NICODERM CQ) 14 MG/24HR 24 hr patch 1 patch  1 patch Transdermal Daily Evelia Grimm DO   1 patch at 01/29/25 0816    nicotine (NICORETTE) gum 2 mg  2 mg Buccal Q1H PRN González Garcia MD   2 mg at 10/24/24 1254    OLANZapine zydis (zyPREXA) ODT tab 5 mg  5 mg Oral At Bedtime González Garcia MD   5 mg at 01/29/25 2017    Or    OLANZapine (zyPREXA) injection 10 mg  10 mg Intramuscular At Bedtime González Garcia MD   10 mg at 01/12/25 2211    OLANZapine (zyPREXA) tablet 5 mg  5 mg Oral TID PRN Evelia Grimm DO   5 mg at 01/05/25 1645    Or    OLANZapine (zyPREXA) injection 5 mg  5 mg Intramuscular TID PRN Evelia Grimm DO        polyethylene glycol (MIRALAX) Packet 17 g  17 g Oral Daily PRN Nikki Caceres MD        senna-docusate (SENOKOT-S/PERICOLACE) 8.6-50 MG per tablet 1 tablet  1 tablet Oral Daily Evelia Grimm DO   1 tablet at 01/29/25 0816    traZODone (DESYREL) half-tab 25 mg  25 mg Oral At Bedtime Rocío Lopez MD   25 mg at 01/29/25 2016    traZODone (DESYREL) half-tab 25 mg  25 mg Oral At Bedtime PRN Evelia Grimm DO   25 mg at 12/24/24 0046     Labs and Imaging:  Data this admission:  - CBC unremarkable  - CMP unremarkable  - TSH normal  - UDS negative  - Vit D low  - Hgb A1c 5.9 (10/13/24)  - Lipids unremarkable  - Vit B12 normal  - Folate normal  - Urinalysis unremarkable  - EKG normal sinus rhythm, QTc 390 ms  - Head CT showed no acute changes  - HIV non reactive  - Treponema antibody non  "reactive  - ESR wnl   - Ceruloplasmin wnl   - BOOGIE negative  - Lyme negative      Parathyroid hormone:   85 (10/13)     Calcium:  11.4 (10/14) -> 10.3 (10/16) -> 9.8 (10/19) -> 10.6 (10/21) -> 10.3 (10/23)     Albumin:  4.3 (10/14) -->  4.3 (10/16) -> 4.1 (10/17) -> 4.2 (10/19) -> 4.5 (10/21) -> 4.3 (10/23)      Mental Status Exam:   Oriented to:  Person/Self  General:  Awake and Alert  Appearance:  appears stated age, Grooming is adequate, Dressed in own clothing, and overweight  Behavior/Attitude:  Disorganized, confused  Eye Contact: Staring  Psychomotor: Normal and No evidence of tics, dystonia, or tardive dyskinesia  no catatonia present  Speech:  appropriate volume/tone  Language: Fluent in English with appropriate syntax and vocabulary.  Mood:  \"good\"   Affect:  appropriate and congruent with mood  Thought Process:  disorganized, confused, and incoherent  Thought Content:   No SI/HI/AH/VH; No apparent delusions  Associations:  loose  Insight:  impaired due to neurocognitive disorder   Judgment:  impaired due to neurocognitive disorder  Impulse control: impaired  Attention Span:  inattentive  Concentration:   impaired by neurocognitive disorder  Recent and Remote Memory:   remote memory intact, recent memory impaired  Fund of Knowledge: average  Muscle Strength and Tone: normal  Gait and Station: Normal     Psychiatric Assessment     Estevan Aaron is a 64 year old male with previous psychiatric diagnoses of GEORGINA admitted from the ER on 10/12/2024 due to concern for HI and psychosis in the context of medical issues (hyperthyroidism, hypercalcemia), psychosocial stressors including with recent divorce and selling his home. This is the patient's first psychiatric hospitalization. The MSE on admission was pertinent for a thought process which was perseverative, circumstantial, tangential, disorganized and tangential; with rambling and looseness of associations. Psychological contributions to presentation included " lack of insight. Social factors contributing to presentation included isolation, recent divorce, selling his house, and moving from hotel to hotel. Biological contributions to presentation included a history of hyperparathyroidism with chronic hypercalcemia per charts as well as a history of methamphetamine use per collateral from ex-wife.     Per collateral from ex-wife, Santos has had paranoid ideations since they first met. He has always felt like people are out to get him or trying to rip him off. She says that he has had visual hallucinations (he will point things out that the rest of the family can't see) for a long time, but the family would just go along with him to avoid making him angry. This timeline and presentation could be consistent with diagnosis of paranoid personality disorder. Things began to worsen around the beginning of the COVID pandemic, when Santos became more isolated and the family started to notice cognitive issues such as impaired memory. Other things that could be contributing to his presentation is hyperparathyroidism with hypercalcemia. However, patient's calcium returned to normal limits on 10/16, and patient continues to have disorganized behavior unrelated to calcium elevation, so likely this is not a significant contributing factor to patient's presentation.      Currently, Santos is at times disoriented, but easily re-directable. Presents with some difficulty of word articulation, which contributes to his frustration when interacting with psych team, as he finds it difficult to explain himself. He is able to take care of his ADLs without much assistance and he is mostly independent in the unit. On the other hand, his confused behavior tend to increase in the evenings, seemingly related to  Neurocognitive Disorder diagnosis, and this could evolved to be his new baseline. Either way, Santos does not present as a danger to himself or other so his SIO was discontinued. Santos does not present with  any new episodes of physical agitation and is able to attend groups and interact with peers without major altercations. Patient currently is stable with current neuroleptic dose, patient probably wont benefit from any modification in current therapy.          Psychiatric Plan by Diagnosis     Today's changes:  - No medication changes.     # Major Neurocognitive Disorder  1. Medications:  - Olanzapine 5 mg at bedtime     3. Additional Plans:  - Patient will be treated in therapeutic milieu with appropriate individual and group therapies as described  - Pending memory facility placement.      # Unspecified anxiety vs Generalized Anxiety Disorder  - Monitor for symptoms.  - Fluoxetine held due to suspicion of ongoing manic symptoms     Psychiatric Hospital Course:      Estevan Aaron was admitted to [facility] on a 72 hour hold.   Medications:  PTA fluoxetine was held due to concern for worsening of zelalem   New medications started at the time of admission include Zyprexa.   Increased olanzapine 10 mg at bedtime was to 10 mg BID (10/14)   Increased olanzapine 10 mg BID to 10 mg during day and 15 mg at bedtime (10/15)  10/21: increased olanzapine pm dose from 15 to 20 mg  10/23: started melatonin 3 mg at 7 pm to help patient with circadian rhythm as he has been staying up throughout the night and sleeping a lot during the day and there is some concern for delirium   10/24: consulted anesthesia for MRI brain   10/28: MRI brain complete, decrease AM olanzapine from 10 to 5 mg  10/29: Morning olanzapine decreased to 2.5 mg, evening olanzapine decreased to 15 mg   11/1: Decreased evening olanzapine to 10 mg   11/4: Decreased evening olanzapine to 7.5 mg   11/5: Decreased evening olanzapine to 5 mg   11/11: Moved morning dose of olanzapine to the afternoon as patient appears more agitated in the afternoons/evenings  11/21: Started memantine 5 mg daily   11/26: Discontinued olanzapine 2.5 mg during the afternoon  12/2:    Discontinued memantine 5 mg, daily    The risks, benefits, alternatives, and side effects were discussed and understood by the patient and other caregivers.     Medical Assessment and Plan     Medical diagnoses to be addressed this admission:    # Hip Pain  - New right hip pain, and mild pain in multiple joints. Moderate response to topical antiinflammatory gel and lidocaine patch.   - Medicine consulted for recommendations 12/20    # CHF  # Hypertension  - Continue PTA medications  Furosemide 40 mg daily  Lisinopril 40 mg daily  Metoprolol 50 mg daily  Amlodipine 10 mg daily  Clonidine 0.1 mg BID      # Hyperlipidemia  - Continue PTA Atorvastatin 40 mg     # Primary Hyperparathyroidism  # Hypercalcemia, hypophosphoremia   Increased Ca level to 10.9 and decreased Ph level to 2.3 in the ED   - Consulted endocrinology, who started cinacalcet 30 mg BID on 10/13  - 10/16 endocrinology recommended continuing to trend calcium and albumin to make sure patient does not become hypocalcemic and recommended holding cinacalcet   - 10/17, calcium and albumin wnl, endo recommended cinacalcet 30 mg once daily   - 10/21, per endo continue cincaclcet and recheck calcium and albumin on October 24  - 10/23: per endo, patient needs to follow up outpatient for further management of hyperparathyroidism     # Rhinorrhea  1/15 currently multiple COVID infections on unit. No other symptoms of COVID noted. COVID PCR - on 1/13.    Medical course: Patient was physically examined by the ED prior to being transferred to the unit and was found to be medically stable and appropriate for admission.      Consults: Psychiatry, Endocrinology (follow-up of hyperthyroidism / hypercalcemia and hypertension), neurology     Checklist     Legal Status: Committed   [place] meds: Haldol, Clozaril, Risperdal, Invega, Zyprexa, Seroquel, Abilify    Safety Assessment:   Behavioral Orders   Procedures    Code 1 - Restrict to Unit    Routine Programming     As  clinically indicated    Status 15     Every 15 minutes.    Status Individual Observation     Patient SIO status reviewed with team/RN.  Please also refer to RN/team documentation for add'l detail.    -SIO staff to monitor following which have contributed to patient being on SIO:  Patient intrusiveness to other patients  -Possible interventions SIO staff could use to support patient's treatment progress:  Redirection  -When following observed, team will review discontinuation of SIO:  Improvement in intrusive behavior.     Order Specific Question:   CONTINUOUS 24 hours / day     Answer:   Other     Order Specific Question:   Specify distance     Answer:   10 feet     Order Specific Question:   Indications for SIO     Answer:   Severe intrusiveness       Risk Assessment:  Risk for harm is low  Risk factors: impulsive and past behaviors  Protective factors: family      SIO: No    Disposition: Pending assessment for memory care facility.       Attestations     This patient was seen and discussed with my attending physician.  Evelia Grimm DO  PGY-1 Psychiatry Resident      Attestation:  This patient has been seen and evaluated by me, Pete Smith MD.  I have discussed this patient with the house staff team including the resident and/or medical student and I agree with the findings and plan in this note.    I have reviewed today's vital signs, medications, labs and imaging. Pete Smith MD , PhD.

## 2025-01-30 NOTE — PLAN OF CARE
BEH IP Unit Acuity Rating Score (UARS)  Patient is given one point for every criteria they meet.    CRITERIA SCORING   On a 72 hour hold, court hold, committed, stay of commitment, or revocation. 1    Patient LOS on BEH unit exceeds 20 days. 1  LOS: 110   Patient under guardianship, 55+, otherwise medically complex, or under age 11. 1   Suicide ideation without relief of precipitating factors. 0   Current plan for suicide. 0   Current plan for homicide. 0   Imminent risk or actual attempt to seriously harm another without relief of factors precipitating the attempt. 1   Severe dysfunction in daily living (ex: complete neglect for self care, extreme disruption in vegetative function, extreme deterioration in social interactions). 1   Recent (last 7 days) or current physical aggression in the ED or on unit. 0   Restraints or seclusion episode in past 72 hours. 0   Recent (last 7 days) or current verbal aggression, agitation, yelling, etc., while in the ED or unit. 0   Active psychosis. 1   Need for constant or near constant redirection (from leaving, from others, etc).  0   Intrusive or disruptive behaviors. 0   Patient requires 3 or more hours of individualized nursing care per 8-hour shift (i.e. for ADLs, meds, therapeutic interventions). 0   TOTAL 6

## 2025-01-30 NOTE — PLAN OF CARE
The pt was observed in the lounge for the majority of the shift, watching TV and interacting with a select peers. The pt displayed a calm mood and a flat affect, consistent with previous observations. The pt engaged in conversation with the write, though some illogical statements and intermittently confusion were noted. The pt denied any MH concerns and contracted for safety. There were no behavioral escalations or safety concerns noted or reported. The pt's appetite was good and maintained adequate fluid intake. The pt complied with med and care.    /75 (BP Location: Left arm, Patient Position: Sitting, Cuff Size: Adult Large)   Pulse 67   Temp 97.8  F (36.6  C) (Oral)   Resp 16   Wt 115.3 kg (254 lb 1.6 oz)   SpO2 95%   BMI 41.01 kg/m      Problem: Adult Behavioral Health Plan of Care  Goal: Plan of Care Review  Outcome: Progressing  Flowsheets (Taken 1/30/2025 1630)  Plan of Care Reviewed With: patient  Patient Agreement with Plan of Care: agrees     Problem: Psychotic Signs/Symptoms  Goal: Improved Mood Symptoms (Psychotic Signs/Symptoms)  Outcome: Progressing  Intervention: Optimize Emotion and Mood  Recent Flowsheet Documentation  Taken 1/30/2025 1630 by Jarrod Keenan RN  Supportive Measures: active listening utilized  Diversional Activity: television  Intervention: Optimize Emotion and Mood  Recent Flowsheet Documentation  Taken 1/30/2025 1630 by Jarrod Keenan RN  Supportive Measures: active listening utilized  Diversional Activity: television   Goal Outcome Evaluation:    Plan of Care Reviewed With: patient Plan of Care Reviewed With: patient

## 2025-01-30 NOTE — PLAN OF CARE
Team Note Due:  Monday    Assessment/Intervention/Current Symtoms and Care Coordination:  Chart review and met with team, discussed pt progress, symptomology, and response to treatment.  Discussed the discharge plan and any potential impediments to discharge. Clifford Aaron was emergency guardian/conservator until 01/20/2025. A petition for permanent guardianship/conservatorship has been filed.    No changes. Awaiting update on guardianship status to move into [facility].    Discharge Plan or Goal:  Memory care facility     Barriers to Discharge:  Patient requires further psychiatric stabilization due to current symptomology, medication management with changes subject to provider, coordination with outside supports, and aftercare planning. Pt is under civil commitment.     Referral Status:  Memory Care approved:  [facility]. Tour completed 11/30. Per Clifford on 1/8, she would like to move forward with a referral. Approved to move in February but waiting on guardianship to be re-established before a discharge date can be set.    Memory Care facilities currently in process:  [facility]. Tour completed 11/27.  [facility] (Exec Director): [phone]  [facility]. Tour scheduled 1/8/25. Have immediate openings and will accept EW. Family not requesting referral yet.  [facility]. Per Clifford on 1/20, she would like to move forward with a referral. Records sent 1/20.  [email or web address]    Memory Care facilities pending family review:  [facility].  [facility] [place].  [facility].  [facility].  [facility].    Memory Care facilities declined:  [facility] (private pay only, no EW).  [facility] (private pay only, no EW).  [facility] (no openings).  [facility]. Tour completed 11/29.  Records sent 12/2/24. Declined 12/19/24 (don't feel they are an appropriate facility for pt's needs).  Lorraine (): [email or web address]; [phone]  Isatu (director of nursing): [email or web address]  [facility].  Denied 12/26 (concerned about dementia with psychosis, history of hallucinations, high elopement risk, still on 1:1).  Nikki Noel: [email or web address]; Office [phone], Fax [phone]   [facility]. Not accepting EW as of 1/7/25.    Legal Status:  MI Commitment with [facility]: [place]  File Number: [id number]  Start and expiration date of commitment: 10/24/24 - 04/24/25    [facility] meds: Haldol, Clozaril, Risperdal, Invega, Zyprexa, Seroquel, Abilify    PPS/CM:  Shelby Chowdary: [phone]  [email or web address].    Contacts:  Maria C Aaron (Daughter): [phone]   Clifford Aaron (ex-wife): [phone]     No Del Angel (guardianship/conservatorship ): [phone]  [email or web address]    Derrell Duff ([place] ): [phone]  [email or web address]     Upcoming Meetings and Dates/Important Information and next steps:  Schedule ambulance transport on 1/31 for Monday 2/3   Coordinate discharge/paperwork with [facility]  Send PD/Discharge Summary to   Send PD/COS to [facility]    Provisional Discharge and Change of Status needed at discharge

## 2025-01-30 NOTE — PLAN OF CARE
The pt was visible in the milieu, sitting in the lounge watching TV, and minimally socializing a selected peers. The pt displayed a calm mood, with flat affect. While appropriately engaged with staff, the pt occasional made illogical statements and showed intermittent confusion. The pt denied experiencing anxiety, depression, AH/VH, SI/HI and contracted for safety. The pt experienced periods of irritability, which were effectively managed through verbal de-escalation. No behavioral escalations or safety concerns were noted or reported. The pt complied with med and care.     Problem: Adult Behavioral Health Plan of Care  Goal: Plan of Care Review  Outcome: Progressing  Flowsheets (Taken 1/29/2025 1630)  Plan of Care Reviewed With: patient  Patient Agreement with Plan of Care: agrees    Problem: Psychotic Signs/Symptoms  Goal: Improved Mood Symptoms (Psychotic Signs/Symptoms)  Outcome: Progressing  Intervention: Optimize Emotion and Mood  Recent Flowsheet Documentation  Taken 1/29/2025 1630 by Jarrod Keenan RN  Supportive Measures: active listening utilized  Diversional Activity: television  Intervention: Optimize Emotion and Mood  Recent Flowsheet Documentation  Taken 1/29/2025 1630 by Jarrod Keenan RN  Supportive Measures: active listening utilized  Diversional Activity: television      Goal Outcome Evaluation:    Plan of Care Reviewed With: patient Plan of Care Reviewed With: patient

## 2025-01-30 NOTE — PLAN OF CARE
Problem: Adult Behavioral Health Plan of Care  Goal: Adheres to Safety Considerations for Self and Others  Outcome: Progressing     Problem: Psychotic Signs/Symptoms  Goal: Improved Behavioral Control (Psychotic Signs/Symptoms)  Outcome: Progressing     Problem: Suicidal Behavior  Goal: Suicidal Behavior is Absent or Managed  Outcome: Progressing     Problem: Sleep Disturbance  Goal: Adequate Sleep/Rest  Outcome: Progressing     Problem: Manic or Hypomanic Signs/Symptoms  Goal: Improved Impulse Control (Manic/Hypomanic Signs/Symptoms)  Outcome: Progressing   Goal Outcome Evaluation:    Pt presented with a brighter affect today. Less being argumentative with staff. He is  pleasant and cooperative. Interacts and engages with peers, interactions are appropriate. Pt sleeps off and on at the lounge area while siting. Pt is eating and drinking adequately. He is medication compliant. Metoprolol held in the morning. Order parameters not met. No behavioral concerns this shift.

## 2025-01-30 NOTE — PLAN OF CARE
Problem: Sleep Disturbance  Goal: Adequate Sleep/Rest  Outcome: Progressing    Pt appears to have slept for 4.5 hours.  Pt was asleep in his room at the start of the shift. He slept intermittently in his room and the lounge area. Pt refused redirection to sleep in his room for the most part of the shift. He denies pain, anxiety, depression, and SI/SIB. No PRN medications were given. 15 minutes safety checks were in place. Staff will continue to offer support to pt.

## 2025-01-31 PROCEDURE — 250N000013 HC RX MED GY IP 250 OP 250 PS 637

## 2025-01-31 PROCEDURE — 124N000002 HC R&B MH UMMC

## 2025-01-31 PROCEDURE — 99231 SBSQ HOSP IP/OBS SF/LOW 25: CPT | Mod: GC | Performed by: PSYCHIATRY & NEUROLOGY

## 2025-01-31 PROCEDURE — 90853 GROUP PSYCHOTHERAPY: CPT

## 2025-01-31 RX ADMIN — SENNOSIDES AND DOCUSATE SODIUM 1 TABLET: 50; 8.6 TABLET ORAL at 08:15

## 2025-01-31 RX ADMIN — Medication 25 MG: at 20:37

## 2025-01-31 RX ADMIN — MELATONIN TAB 3 MG 3 MG: 3 TAB at 20:37

## 2025-01-31 RX ADMIN — Medication 1 PATCH: at 08:15

## 2025-01-31 RX ADMIN — CLONIDINE HYDROCHLORIDE 0.1 MG: 0.1 TABLET ORAL at 08:15

## 2025-01-31 RX ADMIN — LISINOPRIL 40 MG: 10 TABLET ORAL at 08:15

## 2025-01-31 RX ADMIN — ATORVASTATIN CALCIUM 40 MG: 20 TABLET, FILM COATED ORAL at 08:15

## 2025-01-31 RX ADMIN — CINACALCET 30 MG: 30 TABLET ORAL at 08:15

## 2025-01-31 RX ADMIN — FUROSEMIDE 40 MG: 40 TABLET ORAL at 08:15

## 2025-01-31 RX ADMIN — CLONIDINE HYDROCHLORIDE 0.1 MG: 0.1 TABLET ORAL at 20:37

## 2025-01-31 RX ADMIN — AMLODIPINE BESYLATE 10 MG: 5 TABLET ORAL at 08:15

## 2025-01-31 RX ADMIN — METOPROLOL SUCCINATE 50 MG: 50 TABLET, EXTENDED RELEASE ORAL at 08:15

## 2025-01-31 RX ADMIN — OLANZAPINE 5 MG: 5 TABLET, ORALLY DISINTEGRATING ORAL at 20:38

## 2025-01-31 ASSESSMENT — ACTIVITIES OF DAILY LIVING (ADL)
ADLS_ACUITY_SCORE: 66
ORAL_HYGIENE: INDEPENDENT
ADLS_ACUITY_SCORE: 66
DRESS: INDEPENDENT
ADLS_ACUITY_SCORE: 66
HYGIENE/GROOMING: INDEPENDENT
ADLS_ACUITY_SCORE: 66
ORAL_HYGIENE: INDEPENDENT
ADLS_ACUITY_SCORE: 66
ADLS_ACUITY_SCORE: 66
LAUNDRY: WITH SUPERVISION
ADLS_ACUITY_SCORE: 66
DRESS: INDEPENDENT
ADLS_ACUITY_SCORE: 66
HYGIENE/GROOMING: INDEPENDENT
LAUNDRY: WITH SUPERVISION
ADLS_ACUITY_SCORE: 66

## 2025-01-31 NOTE — PROGRESS NOTES
----------------------------------------------------------------------------------------------------------  [facility]  Psychiatry Progress Note  Hospital Day #111     Interim History:     The patient's care was discussed with the treatment team and chart notes were reviewed.    Identifier: Estevan Aaron is a 64 year old male with previous psychiatric diagnoses of  generalized anxiety disorder, Neurocognitive disorder, admitted from the ED 10/12/2024 due to concern for HI and psychosis, which now have resolved, in the psychosocial stressors (recent divorce)     Sleep: 4.5 hours (01/31/25 0600)  Scheduled medications: Took all scheduled medications as prescribed  Psychiatric PRN medications:  none     Staff Report:   No acute events over night. Seen with bright affect and less argumentative with staff. Interacts and engages with select peers.      Subjective:     Patient Interview:  Santos was interviewed in milieu. Was seen reading through a handout on setting boundaries. Expresses concern over another peer in the unit that had a code 21 called on.     Perseverates on wanting to help the peer through what she's experiencing. Kindly asked the patient to give her space.   Aside from this peer he's concerns about, says he has no other questions or concerns.     ROS:  See above     Objective:     Vitals:  /75 (BP Location: Left arm, Patient Position: Sitting, Cuff Size: Adult Large)   Pulse 67   Temp 97.8  F (36.6  C) (Oral)   Resp 16   Wt 115.3 kg (254 lb 1.6 oz)   SpO2 95%   BMI 41.01 kg/m      Allergies:  No Known Allergies    Current Medications:  Scheduled:  Current Facility-Administered Medications   Medication Dose Route Frequency Provider Last Rate Last Admin    acetaminophen (TYLENOL) tablet 650 mg  650 mg Oral Q4H PRN Nikki Caceres MD   650 mg at 01/09/25 0228    alum & mag hydroxide-simethicone (MAALOX) suspension 30 mL  30 mL Oral Q4H PRN  Nikki Caceres MD   30 mL at 10/17/24 0837    amLODIPine (NORVASC) tablet 10 mg  10 mg Oral Daily Presley Barrera MD   10 mg at 01/30/25 0844    atorvastatin (LIPITOR) tablet 40 mg  40 mg Oral Daily Nikki Caceres MD   40 mg at 01/30/25 0845    cholecalciferol (VITAMIN D3) capsule 1,250 mcg  1,250 mcg Oral Q7 Days Evelia Grimm DO   1,250 mcg at 01/28/25 1146    cinacalcet (SENSIPAR) tablet 30 mg  30 mg Oral Daily Presley Barrera MD   30 mg at 01/30/25 0845    cloNIDine (CATAPRES) tablet 0.1 mg  0.1 mg Oral BID Presley Barrera MD   0.1 mg at 01/30/25 2039    diclofenac (VOLTAREN) 1 % topical gel 2 g  2 g Topical 4x Daily PRN Rocío Lopez MD   2 g at 12/22/24 2032    furosemide (LASIX) tablet 40 mg  40 mg Oral Daily Presley Barrera MD   40 mg at 01/30/25 0845    gabapentin (NEURONTIN) capsule 100 mg  100 mg Oral Q6H PRN Nikki Caceres MD   100 mg at 12/24/24 0046    hydrocortisone (CORTAID) 0.5 % cream   Topical Daily PRN Savi Chamorro MD        Lidocaine (LIDOCARE) 4 % Patch 1 patch  1 patch Transdermal Q24H PRN Rocío Lopez MD   1 patch at 12/22/24 2023    lisinopril (ZESTRIL) tablet 40 mg  40 mg Oral Daily Presley Barrera MD   40 mg at 01/30/25 0844    melatonin tablet 3 mg  3 mg Oral At Bedtime Presley Barrera MD   3 mg at 01/30/25 2039    metoprolol succinate ER (TOPROL XL) 24 hr tablet 50 mg  50 mg Oral Daily Presley Barrera MD   50 mg at 01/29/25 0815    nicotine (NICODERM CQ) 14 MG/24HR 24 hr patch 1 patch  1 patch Transdermal Daily Evelia Grimm DO   1 patch at 01/30/25 0847    nicotine (NICORETTE) gum 2 mg  2 mg Buccal Q1H PRN González Garcia MD   2 mg at 10/24/24 1254    OLANZapine zydis (zyPREXA) ODT tab 5 mg  5 mg Oral At Bedtime González Garcia MD   5 mg at 01/30/25 2039    Or    OLANZapine (zyPREXA) injection 10 mg  10 mg Intramuscular At Bedtime González Garcia MD   10 mg at 01/12/25 2211    OLANZapine (zyPREXA) tablet 5 mg  5  mg Oral TID PRN Evelia Grimm DO   5 mg at 01/05/25 1645    Or    OLANZapine (zyPREXA) injection 5 mg  5 mg Intramuscular TID PRN Evelia Grimm DO        polyethylene glycol (MIRALAX) Packet 17 g  17 g Oral Daily PRN Nikki Caceres MD        senna-docusate (SENOKOT-S/PERICOLACE) 8.6-50 MG per tablet 1 tablet  1 tablet Oral Daily Evelia Grimm DO   1 tablet at 01/30/25 0845    traZODone (DESYREL) half-tab 25 mg  25 mg Oral At Bedtime Rocío Lopez MD   25 mg at 01/30/25 2039    traZODone (DESYREL) half-tab 25 mg  25 mg Oral At Bedtime PRN Evelia Grimm DO   25 mg at 12/24/24 0046       PRN:  Current Facility-Administered Medications   Medication Dose Route Frequency Provider Last Rate Last Admin    acetaminophen (TYLENOL) tablet 650 mg  650 mg Oral Q4H PRN Nikki Caceres MD   650 mg at 01/09/25 0228    alum & mag hydroxide-simethicone (MAALOX) suspension 30 mL  30 mL Oral Q4H PRN Nikki Caceres MD   30 mL at 10/17/24 0837    amLODIPine (NORVASC) tablet 10 mg  10 mg Oral Daily Presley Barrera MD   10 mg at 01/30/25 0844    atorvastatin (LIPITOR) tablet 40 mg  40 mg Oral Daily Nikki Caceres MD   40 mg at 01/30/25 0845    cholecalciferol (VITAMIN D3) capsule 1,250 mcg  1,250 mcg Oral Q7 Days Evelia Grimm DO   1,250 mcg at 01/28/25 1146    cinacalcet (SENSIPAR) tablet 30 mg  30 mg Oral Daily Presley Barrera MD   30 mg at 01/30/25 0845    cloNIDine (CATAPRES) tablet 0.1 mg  0.1 mg Oral BID Presley Barrera MD   0.1 mg at 01/30/25 2039    diclofenac (VOLTAREN) 1 % topical gel 2 g  2 g Topical 4x Daily PRN Rocío Lopez MD   2 g at 12/22/24 2032    furosemide (LASIX) tablet 40 mg  40 mg Oral Daily Presley Barrera MD   40 mg at 01/30/25 0845    gabapentin (NEURONTIN) capsule 100 mg  100 mg Oral Q6H PRN Nikki Caceres MD   100 mg at 12/24/24 0046    hydrocortisone (CORTAID) 0.5 % cream   Topical Daily PRN Savi Chamorro MD        Lidocaine (LIDOCARE) 4 % Patch  1 patch  1 patch Transdermal Q24H PRN Rocío Lopez MD   1 patch at 12/22/24 2023    lisinopril (ZESTRIL) tablet 40 mg  40 mg Oral Daily Presley Barrera MD   40 mg at 01/30/25 0844    melatonin tablet 3 mg  3 mg Oral At Bedtime Presley Barrera MD   3 mg at 01/30/25 2039    metoprolol succinate ER (TOPROL XL) 24 hr tablet 50 mg  50 mg Oral Daily Presley Barrera MD   50 mg at 01/29/25 0815    nicotine (NICODERM CQ) 14 MG/24HR 24 hr patch 1 patch  1 patch Transdermal Daily Evelia Grimm DO   1 patch at 01/30/25 0847    nicotine (NICORETTE) gum 2 mg  2 mg Buccal Q1H PRN González Garcia MD   2 mg at 10/24/24 1254    OLANZapine zydis (zyPREXA) ODT tab 5 mg  5 mg Oral At Bedtime González Garcia MD   5 mg at 01/30/25 2039    Or    OLANZapine (zyPREXA) injection 10 mg  10 mg Intramuscular At Bedtime González Garcia MD   10 mg at 01/12/25 2211    OLANZapine (zyPREXA) tablet 5 mg  5 mg Oral TID PRN Evelia Grimm DO   5 mg at 01/05/25 1645    Or    OLANZapine (zyPREXA) injection 5 mg  5 mg Intramuscular TID PRN Evelia Grimm DO        polyethylene glycol (MIRALAX) Packet 17 g  17 g Oral Daily PRN Nikki Caceres MD        senna-docusate (SENOKOT-S/PERICOLACE) 8.6-50 MG per tablet 1 tablet  1 tablet Oral Daily Evelia Grimm DO   1 tablet at 01/30/25 0845    traZODone (DESYREL) half-tab 25 mg  25 mg Oral At Bedtime Rocío Lopez MD   25 mg at 01/30/25 2039    traZODone (DESYREL) half-tab 25 mg  25 mg Oral At Bedtime PRN Evelia Grimm DO   25 mg at 12/24/24 0046     Labs and Imaging:  Data this admission:  - CBC unremarkable  - CMP unremarkable  - TSH normal  - UDS negative  - Vit D low  - Hgb A1c 5.9 (10/13/24)  - Lipids unremarkable  - Vit B12 normal  - Folate normal  - Urinalysis unremarkable  - EKG normal sinus rhythm, QTc 390 ms  - Head CT showed no acute changes  - HIV non reactive  - Treponema antibody non reactive  - ESR wnl   - Ceruloplasmin wnl   - BOOGIE negative  - Lyme  "negative      Parathyroid hormone:   85 (10/13)     Calcium:  11.4 (10/14) -> 10.3 (10/16) -> 9.8 (10/19) -> 10.6 (10/21) -> 10.3 (10/23)     Albumin:  4.3 (10/14) -->  4.3 (10/16) -> 4.1 (10/17) -> 4.2 (10/19) -> 4.5 (10/21) -> 4.3 (10/23)      Mental Status Exam:   Oriented to:  Person/Self  General:  Awake and Alert  Appearance:  appears stated age, Grooming is adequate, Dressed in own clothing, and overweight  Behavior/Attitude:  Confused   Eye Contact: Appropriates   Psychomotor: Normal and No evidence of tics, dystonia, or tardive dyskinesia  no catatonia present  Speech:  appropriate volume/tone  Language: Fluent in English with appropriate syntax and vocabulary.  Mood:  \"good\"   Affect:  appropriate and congruent with mood  Thought Process:  disorganized, confused, and incoherent  Thought Content:   No SI/HI/AH/VH; No apparent delusions  Associations:  loose  Insight:  impaired due to neurocognitive disorder   Judgment:  impaired due to neurocognitive disorder  Impulse control: impaired  Attention Span:  inattentive  Concentration:   impaired by neurocognitive disorder  Recent and Remote Memory:   remote memory intact, recent memory impaired  Fund of Knowledge: average  Muscle Strength and Tone: normal  Gait and Station: Normal     Psychiatric Assessment     Estevan Aaron is a 64 year old male with previous psychiatric diagnoses of GEORGINA admitted from the ER on 10/12/2024 due to concern for HI and psychosis in the context of medical issues (hyperthyroidism, hypercalcemia), psychosocial stressors including with recent divorce and selling his home. This is the patient's first psychiatric hospitalization. The MSE on admission was pertinent for a thought process which was perseverative, circumstantial, tangential, disorganized and tangential; with rambling and looseness of associations. Psychological contributions to presentation included lack of insight. Social factors contributing to presentation included " isolation, recent divorce, selling his house, and moving from hotel to hotel. Biological contributions to presentation included a history of hyperparathyroidism with chronic hypercalcemia per charts as well as a history of methamphetamine use per collateral from ex-wife.     Per collateral from ex-wife, Santos has had paranoid ideations since they first met. He has always felt like people are out to get him or trying to rip him off. She says that he has had visual hallucinations (he will point things out that the rest of the family can't see) for a long time, but the family would just go along with him to avoid making him angry. This timeline and presentation could be consistent with diagnosis of paranoid personality disorder. Things began to worsen around the beginning of the COVID pandemic, when Santos became more isolated and the family started to notice cognitive issues such as impaired memory. Other things that could be contributing to his presentation is hyperparathyroidism with hypercalcemia. However, patient's calcium returned to normal limits on 10/16, and patient continues to have disorganized behavior unrelated to calcium elevation, so likely this is not a significant contributing factor to patient's presentation.      Currently, Santos is at times disoriented, but easily re-directable. Presents with some difficulty of word articulation, which contributes to his frustration when interacting with psych team, as he finds it difficult to explain himself. He is able to take care of his ADLs without much assistance and he is mostly independent in the unit. On the other hand, his confused behavior tend to increase in the evenings, seemingly related to  Neurocognitive Disorder diagnosis, and this could evolved to be his new baseline. Either way, Santos does not present as a danger to himself or other so his SIO was discontinued. Santos does not present with any new episodes of physical agitation and is able to attend groups  and interact with peers without major altercations. Patient currently is stable with current neuroleptic dose, patient probably wont benefit from any modification in current therapy.          Psychiatric Plan by Diagnosis     Today's changes:  - No medication changes.     # Major Neurocognitive Disorder  1. Medications:  - Olanzapine 5 mg at bedtime     3. Additional Plans:  - Patient will be treated in therapeutic milieu with appropriate individual and group therapies as described  - Pending memory facility placement.      # Unspecified anxiety vs Generalized Anxiety Disorder  - Monitor for symptoms.  - Fluoxetine held due to suspicion of ongoing manic symptoms     Psychiatric Hospital Course:      Estevan Aaron was admitted to [facility] on a 72 hour hold.   Medications:  PTA fluoxetine was held due to concern for worsening of zelalem   New medications started at the time of admission include Zyprexa.   Increased olanzapine 10 mg at bedtime was to 10 mg BID (10/14)   Increased olanzapine 10 mg BID to 10 mg during day and 15 mg at bedtime (10/15)  10/21: increased olanzapine pm dose from 15 to 20 mg  10/23: started melatonin 3 mg at 7 pm to help patient with circadian rhythm as he has been staying up throughout the night and sleeping a lot during the day and there is some concern for delirium   10/24: consulted anesthesia for MRI brain   10/28: MRI brain complete, decrease AM olanzapine from 10 to 5 mg  10/29: Morning olanzapine decreased to 2.5 mg, evening olanzapine decreased to 15 mg   11/1: Decreased evening olanzapine to 10 mg   11/4: Decreased evening olanzapine to 7.5 mg   11/5: Decreased evening olanzapine to 5 mg   11/11: Moved morning dose of olanzapine to the afternoon as patient appears more agitated in the afternoons/evenings  11/21: Started memantine 5 mg daily   11/26: Discontinued olanzapine 2.5 mg during the afternoon  12/2:   Discontinued memantine 5 mg, daily    The risks, benefits, alternatives,  and side effects were discussed and understood by the patient and other caregivers.     Medical Assessment and Plan     Medical diagnoses to be addressed this admission:    # Hip Pain  - New right hip pain, and mild pain in multiple joints. Moderate response to topical antiinflammatory gel and lidocaine patch.   - Medicine consulted for recommendations 12/20    # CHF  # Hypertension  - Continue PTA medications  Furosemide 40 mg daily  Lisinopril 40 mg daily  Metoprolol 50 mg daily  Amlodipine 10 mg daily  Clonidine 0.1 mg BID      # Hyperlipidemia  - Continue PTA Atorvastatin 40 mg     # Primary Hyperparathyroidism  # Hypercalcemia, hypophosphoremia   Increased Ca level to 10.9 and decreased Ph level to 2.3 in the ED   - Consulted endocrinology, who started cinacalcet 30 mg BID on 10/13  - 10/16 endocrinology recommended continuing to trend calcium and albumin to make sure patient does not become hypocalcemic and recommended holding cinacalcet   - 10/17, calcium and albumin wnl, endo recommended cinacalcet 30 mg once daily   - 10/21, per endo continue cincaclcet and recheck calcium and albumin on October 24  - 10/23: per endo, patient needs to follow up outpatient for further management of hyperparathyroidism     # Rhinorrhea  1/15 currently multiple COVID infections on unit. No other symptoms of COVID noted. COVID PCR - on 1/13.    Medical course: Patient was physically examined by the ED prior to being transferred to the unit and was found to be medically stable and appropriate for admission.      Consults: Psychiatry, Endocrinology (follow-up of hyperthyroidism / hypercalcemia and hypertension), neurology     Checklist     Legal Status: Committed   [place] meds: Haldol, Clozaril, Risperdal, Invega, Zyprexa, Seroquel, Abilify    Safety Assessment:   Behavioral Orders   Procedures    Code 1 - Restrict to Unit    Routine Programming     As clinically indicated    Status 15     Every 15 minutes.    Status Individual  Observation     Patient SIO status reviewed with team/RN.  Please also refer to RN/team documentation for add'l detail.    -SIO staff to monitor following which have contributed to patient being on SIO:  Patient intrusiveness to other patients  -Possible interventions SIO staff could use to support patient's treatment progress:  Redirection  -When following observed, team will review discontinuation of SIO:  Improvement in intrusive behavior.     Order Specific Question:   CONTINUOUS 24 hours / day     Answer:   Other     Order Specific Question:   Specify distance     Answer:   10 feet     Order Specific Question:   Indications for SIO     Answer:   Severe intrusiveness       Risk Assessment:  Risk for harm is low  Risk factors: impulsive and past behaviors  Protective factors: family      SIO: No    Disposition: Pending assessment for memory care facility.       Attestations     This patient was seen and discussed with my attending physician.  Evelia Grimm DO  PGY-1 Psychiatry Resident      Attestation:  This patient has been seen and evaluated by me, Pete Smith MD.  I have discussed this patient with the house staff team including the resident and/or medical student and I agree with the findings and plan in this note.    I have reviewed today's vital signs, medications, labs and imaging. Pete Smith MD , PhD.

## 2025-01-31 NOTE — PLAN OF CARE
Pt has been in the lounge for the majority of the shift, sleeping on and off. He is social with select peers, attended one OT group. Pt denies mental health symptoms upon assessment. He has not had any behavioral concerns today and has been generally pleasant. Pt is med compliant, no PRNs received. Hygiene slightly unkempt, intake adequate.    Problem: Psychotic Signs/Symptoms  Goal: Optimal Cognitive Function (Psychotic Signs/Symptoms)  Outcome: Not Progressing   Goal Outcome Evaluation:    Plan of Care Reviewed With: patient

## 2025-01-31 NOTE — PLAN OF CARE
Group Attendance:  attended partial group    Time session began: 1015  Time session ended: 1050  Patient's total time in group: 25    Total # Attendees   3   Group Type psychotherapeutic and life skills     Group Topic Covered symptom management, incorporating skill sets, healthy coping skills, and relationships and boundaries     Group Session Detail [place] setting: psychoeducation on boundaries, model assertiveness, use role-playing exercises, discuss real-life scenarios, and encourage open communication and self-awareness.        Patient's response to the group topic/interactions:  positive affect, cooperative with task, supportive of peers, and verbalizations were off topic     Patient Details: Pt joined group partway through activity but quickly became an engaged participant. Pt listened to others' and shared when prompted. Pt was unable to complete most sentences but his ideas were understandable and Pt received much support from peers.             [id number] - Group psychotherapy - 1 Session  Patient Active Problem List   Diagnosis    Hyperlipidemia LDL goal <130    Hypertension goal BP (blood pressure) < 130/80    Hypercalcemia    Hyperparathyroidism    Thalassemia, unspecified type    Psychosis, unspecified psychosis type (H)    Acute psychosis (H)

## 2025-01-31 NOTE — PLAN OF CARE
Team Note Due:  Monday    Assessment/Intervention/Current Symtoms and Care Coordination:  Chart review and met with team, discussed pt progress, symptomology, and response to treatment.  Discussed the discharge plan and any potential impediments to discharge. Clifford Aaron was emergency guardian/conservator until 01/20/2025. A petition for permanent guardianship/conservatorship has been filed.    Received message from Caro Ross with [facility] regarding pt's upcoming guardianship hearing on 2/27. She spoke with Clifford and was given my contact information as she'll need to come visit pt to provide notice on upcoming hearing. Provided address for visiting and requested clarification on when she'll be coming so staff can be aware.    No changes. Awaiting update on guardianship status to move into [facility].    Discharge Plan or Goal:  Memory care facility     Barriers to Discharge:  Patient requires further psychiatric stabilization due to current symptomology, medication management with changes subject to provider, coordination with outside supports, and aftercare planning. Pt is under civil commitment.     Referral Status:  Memory Care approved:  [facility]. Marcus completed 11/30. Per Clifford on 1/8, she would like to move forward with a referral. Approved to move in February but waiting on guardianship to be re-established before a discharge date can be set.    Memory Care facilities currently in process:  [facility]. Tour completed 11/27.  Vera (Exec Director): [phone]  [facility]. Tour scheduled 1/8/25. Have immediate openings and will accept EW. Family not requesting referral yet.  [facility]. Per Clifford on 1/20, she would like to move forward with a referral. Records sent 1/20.  [email or web address]    Memory Care facilities pending family review:  [facility].  [facility].  [facility].  [facility].  [facility].    Memory Care facilities declined:  [facility] (private pay only, no EW).  [facility] (private pay only, no EW).  [facility] (no openings).  [facility]. Tour completed 11/29. Records sent 12/2/24. Declined 12/19/24 (don't feel they are an appropriate facility for pt's needs).  Lorraine (): [email or web address]; [phone]  Isatu (director of nursing): [email or web address]  [facility].  Denied 12/26 (concerned about dementia with psychosis, history of hallucinations, high elopement risk, still on 1:1).  Nikki Noel: [email or web address]; Office [phone], Fax [phone]   [facility]. Not accepting EW as of 1/7/25.    Legal Status:  MI Commitment with [facility]  File Number: [id number]  Start and expiration date of commitment: 10/24/24 - 04/24/25    [facility] meds: Haldol, Clozaril, Risperdal, Invega, Zyprexa, Seroquel, Abilify    PPS/CM:  Shelby Chowdary: [phone]  [email or web address]    Contacts:  Maria C Aaron (Daughter): [phone]   Clifford Agnieszka (ex-wife): [phone]     No Del Angel (guardianship/conservatorship ): [phone]  [email or web address]    Caro Ross ([facility] visitor for guardianship): [phone]  [email or web address]    Derrell Duff ([employer] ): [phone]  [email or web address]     Upcoming Meetings and Dates/Important Information and next steps:  Schedule ambulance transport when discharge is confirmed  Coordinate discharge/paperwork with [facility]  Send PD/Discharge Summary to WAYNE  Send PD/COS to [facility]    Provisional Discharge and Change of Status needed at discharge

## 2025-01-31 NOTE — PLAN OF CARE
Problem: Sleep Disturbance  Goal: Adequate Sleep/Rest  Outcome: Progressing   Goal Outcome Evaluation:     Pt has been in and out of his room this shift. He did not complaint of pain, No PRN given this shift. Pt slept for 4.5 hours. No behavioral concern at this time .No new concern at this time.    Blood pressure 138/75, pulse 67, temperature 97.8  F (36.6  C), temperature source Oral, resp. rate 16, weight 115.3 kg (254 lb 1.6 oz), SpO2 95%.

## 2025-01-31 NOTE — PLAN OF CARE
Problem: Psychotic Signs/Symptoms  Goal: Improved Behavioral Control (Psychotic Signs/Symptoms)  Outcome: Progressing  Intervention: Manage Behavior  Recent Flowsheet Documentation  Taken 1/31/2025 1649 by Tamie Fraser RN  De-Escalation Techniques:   appropriate behavior reinforced   verbally redirected     Problem: Sleep Disturbance  Goal: Adequate Sleep/Rest  Outcome: Progressing     Problem: Anxiety  Goal: Anxiety Reduction or Resolution  1/31/2025 1717 by Tamie Fraser, RN  Outcome: Progressing  1/31/2025 1715 by Tamie Fraser RN  Outcome: Progressing   Goal Outcome Evaluation:    Pt was visible in the lounge watching TV dosing on and off. Affect flat and blunted but pleasant on approach. Pt endorsed right knee pain but declined intervention . Nutrition and hydration adequate. Hygiene is unkempt, unclean. Pt was compliant with medications and contracted for safety. No behavioral concerns noted this shift.

## 2025-01-31 NOTE — PLAN OF CARE
BEH IP Unit Acuity Rating Score (UARS)  Patient is given one point for every criteria they meet.    CRITERIA SCORING   On a 72 hour hold, court hold, committed, stay of commitment, or revocation. 1    Patient LOS on BEH unit exceeds 20 days. 1  LOS: 111   Patient under guardianship, 55+, otherwise medically complex, or under age 11. 1   Suicide ideation without relief of precipitating factors. 0   Current plan for suicide. 0   Current plan for homicide. 0   Imminent risk or actual attempt to seriously harm another without relief of factors precipitating the attempt. 1   Severe dysfunction in daily living (ex: complete neglect for self care, extreme disruption in vegetative function, extreme deterioration in social interactions). 1   Recent (last 7 days) or current physical aggression in the ED or on unit. 0   Restraints or seclusion episode in past 72 hours. 0   Recent (last 7 days) or current verbal aggression, agitation, yelling, etc., while in the ED or unit. 0   Active psychosis. 1   Need for constant or near constant redirection (from leaving, from others, etc).  0   Intrusive or disruptive behaviors. 0   Patient requires 3 or more hours of individualized nursing care per 8-hour shift (i.e. for ADLs, meds, therapeutic interventions). 0   TOTAL 6

## 2025-02-01 PROCEDURE — 250N000013 HC RX MED GY IP 250 OP 250 PS 637

## 2025-02-01 PROCEDURE — 124N000002 HC R&B MH UMMC

## 2025-02-01 RX ADMIN — ATORVASTATIN CALCIUM 40 MG: 20 TABLET, FILM COATED ORAL at 08:53

## 2025-02-01 RX ADMIN — FUROSEMIDE 40 MG: 40 TABLET ORAL at 08:42

## 2025-02-01 RX ADMIN — CLONIDINE HYDROCHLORIDE 0.1 MG: 0.1 TABLET ORAL at 08:42

## 2025-02-01 RX ADMIN — CLONIDINE HYDROCHLORIDE 0.1 MG: 0.1 TABLET ORAL at 20:03

## 2025-02-01 RX ADMIN — OLANZAPINE 5 MG: 5 TABLET, ORALLY DISINTEGRATING ORAL at 20:05

## 2025-02-01 RX ADMIN — METOPROLOL SUCCINATE 50 MG: 50 TABLET, EXTENDED RELEASE ORAL at 08:42

## 2025-02-01 RX ADMIN — SENNOSIDES AND DOCUSATE SODIUM 1 TABLET: 50; 8.6 TABLET ORAL at 08:42

## 2025-02-01 RX ADMIN — MELATONIN TAB 3 MG 3 MG: 3 TAB at 20:03

## 2025-02-01 RX ADMIN — CINACALCET 30 MG: 30 TABLET ORAL at 08:42

## 2025-02-01 RX ADMIN — Medication 25 MG: at 20:05

## 2025-02-01 RX ADMIN — Medication 1 PATCH: at 08:43

## 2025-02-01 RX ADMIN — AMLODIPINE BESYLATE 10 MG: 5 TABLET ORAL at 08:42

## 2025-02-01 RX ADMIN — LISINOPRIL 40 MG: 10 TABLET ORAL at 08:43

## 2025-02-01 ASSESSMENT — ACTIVITIES OF DAILY LIVING (ADL)
HYGIENE/GROOMING: INDEPENDENT
ADLS_ACUITY_SCORE: 66
HYGIENE/GROOMING: INDEPENDENT
ADLS_ACUITY_SCORE: 66

## 2025-02-01 NOTE — PLAN OF CARE
"  Problem: Psychotic Signs/Symptoms  Goal: Improved Behavioral Control (Psychotic Signs/Symptoms)  Outcome: Progressing   Milieu Participation:Behavior: Pt calm, cooperative with staff, and ate 100% of meals today. Pt declined any knee pain. Denies all mental health s/s. Pt observed at the doorway of another patients room stating, \"Well, is the helicopter ready? It's in there right?. You know I'm a  right\"? Pt reoriented and redirected from peers doorway without incident. Medication compliant.    Safety: status 15 SI/Self harm: denies  Aggression/agitation/HI: denies, exhibited safe behavior  AVH: denies Affect: blunted Mood: calm    Physical Complaints/Issues: denies    PRN Med: No PRNs administered this shift  Medication AE: denies    I & O: eating and drinking well  Sleep: denies concerns  LBM: denies concerns  ADLs: independent  Visits/calls: none    Vitals:  Blood pressure (!) 143/80, pulse 64, temperature 97.9  F (36.6  C), temperature source Temporal, resp. rate 16, weight 115.3 kg (254 lb 1.6 oz), SpO2 95%.                         "

## 2025-02-01 NOTE — PLAN OF CARE
Problem: Sleep Disturbance  Goal: Adequate Sleep/Rest  Outcome: Progressing   Goal Outcome Evaluation:  Patient had an uneventful night, asleep at the beginning of the shift and for majority of the night. No behavioral issues. No complaints of pain. No requested or administered prn's. Respirations regular and non-labored. Routine safety checks in place q 15 minutes. Appears to have slept for 6.5 hours.

## 2025-02-01 NOTE — PLAN OF CARE
Problem: Psychotic Signs/Symptoms  Goal: Improved Behavioral Control (Psychotic Signs/Symptoms)  Outcome: Progressing  Intervention: Manage Behavior  Recent Flowsheet Documentation  Taken 2/1/2025 1605 by Tamie Fraser RN  De-Escalation Techniques:   appropriate behavior reinforced   verbally redirected     Problem: Anxiety  Goal: Anxiety Reduction or Resolution  Outcome: Progressing     Problem: Psychotic Signs/Symptoms  Goal: Improved Behavioral Control (Psychotic Signs/Symptoms)  Outcome: Progressing  Intervention: Manage Behavior  Recent Flowsheet Documentation  Taken 2/1/2025 1605 by Tamie Fraser RN  De-Escalation Techniques:   appropriate behavior reinforced   verbally redirected   Goal Outcome Evaluation:    Pt was visible in  the lounge watching TV with peers dosing on and off. Presented with flat affect but pleasant upon assessments. Pt denies any physical  pain or discomfort. Denies all mental health and psych symptoms. Hygiene is unkempt. Disoriented to situation. Intermittent periods of confusion noted. Nutrition and hydration adequate.Pt was compliant with medications. No safety concerns noted. No behavioral outburst.

## 2025-02-02 PROCEDURE — 124N000002 HC R&B MH UMMC

## 2025-02-02 PROCEDURE — 250N000013 HC RX MED GY IP 250 OP 250 PS 637

## 2025-02-02 RX ADMIN — ATORVASTATIN CALCIUM 40 MG: 20 TABLET, FILM COATED ORAL at 07:53

## 2025-02-02 RX ADMIN — OLANZAPINE 5 MG: 5 TABLET, ORALLY DISINTEGRATING ORAL at 21:05

## 2025-02-02 RX ADMIN — MELATONIN TAB 3 MG 3 MG: 3 TAB at 21:05

## 2025-02-02 RX ADMIN — Medication 1 PATCH: at 07:54

## 2025-02-02 RX ADMIN — FUROSEMIDE 40 MG: 40 TABLET ORAL at 07:53

## 2025-02-02 RX ADMIN — SENNOSIDES AND DOCUSATE SODIUM 1 TABLET: 50; 8.6 TABLET ORAL at 07:53

## 2025-02-02 RX ADMIN — CLONIDINE HYDROCHLORIDE 0.1 MG: 0.1 TABLET ORAL at 21:04

## 2025-02-02 RX ADMIN — CLONIDINE HYDROCHLORIDE 0.1 MG: 0.1 TABLET ORAL at 07:53

## 2025-02-02 RX ADMIN — Medication 25 MG: at 21:05

## 2025-02-02 RX ADMIN — LISINOPRIL 40 MG: 10 TABLET ORAL at 07:53

## 2025-02-02 RX ADMIN — CINACALCET 30 MG: 30 TABLET ORAL at 07:53

## 2025-02-02 RX ADMIN — AMLODIPINE BESYLATE 10 MG: 5 TABLET ORAL at 07:53

## 2025-02-02 RX ADMIN — METOPROLOL SUCCINATE 50 MG: 50 TABLET, EXTENDED RELEASE ORAL at 07:53

## 2025-02-02 ASSESSMENT — ACTIVITIES OF DAILY LIVING (ADL)
ADLS_ACUITY_SCORE: 66
HYGIENE/GROOMING: INDEPENDENT
ADLS_ACUITY_SCORE: 66
DRESS: STREET CLOTHES;INDEPENDENT
ORAL_HYGIENE: INDEPENDENT
ADLS_ACUITY_SCORE: 66
LAUNDRY: WITH SUPERVISION
ADLS_ACUITY_SCORE: 66

## 2025-02-02 ASSESSMENT — PATIENT HEALTH QUESTIONNAIRE - PHQ9: SUM OF ALL RESPONSES TO PHQ9 QUESTIONS 1 & 2: 0

## 2025-02-02 NOTE — PLAN OF CARE
Problem: Sleep Disturbance  Goal: Adequate Sleep/Rest  Outcome: Progressing   Goal Outcome Evaluation:  Patient had an uneventful night.  Alternates between sleeping in the lounge area and own room. No behavioral issues. No complaints of pain. No requested or administered prn's. Respirations regular and non-labored. Routine safety checks in place q 15 minutes. Appears to have slept for 6.25 hours.

## 2025-02-02 NOTE — PLAN OF CARE
"  Problem: Anxiety  Goal: Anxiety Reduction or Resolution  Outcome: Progressing     Problem: Suicidal Behavior  Goal: Suicidal Behavior is Absent or Managed  Outcome: Progressing     Problem: Depression  Goal: Improved Mood  Outcome: Progressing   Patient denies anxiety, depression, HI,SI,SIB, or any AH/VH. Patient's VS Blood pressure (!) 158/72, pulse 63, temperature 97.9  F (36.6  C), temperature source Oral, resp. rate 16, weight 115.3 kg (254 lb 1.6 oz), SpO2 94%. Patient BP elevated-Patient is asymtomatic. Patient denies chest pain, SOB, lightheadedness, headache or any N/V. Patient took all his morning medications without any problem. Patient declined COVID-19 test stated \"I'll do it tomorrow\". Patient last COVID test result was negative on 1/16/25. Patient's family visited. Patient mood labile, raises voice while talking to staff but easy redirectable. Teds stockings ordered from [facility]-applied without any difficulty. Educated on the importance of the wearing TEDS, patient's seems to understand, agreed to wear until bedtime. Patient stated \"I'm tired \" encouraged to sleep and have some naps in his room.  Patient continues on q15 minutes safety checks, no behavioral issues noted. Will continue to monitor the patient and provide therapeutic intervention as needed. Will continue with current plan of care. Notify MD with any concerns.   "

## 2025-02-02 NOTE — PLAN OF CARE
Problem: Psychotic Signs/Symptoms  Goal: Improved Behavioral Control (Psychotic Signs/Symptoms)  Outcome: Progressing  Intervention: Manage Behavior  Recent Flowsheet Documentation  Taken 2/2/2025 1616 by Tamie Fraser RN  De-Escalation Techniques: medication administered     Problem: Anxiety  Goal: Anxiety Reduction or Resolution  Outcome: Progressing     Problem: Manic or Hypomanic Signs/Symptoms  Goal: Improved Impulse Control (Manic/Hypomanic Signs/Symptoms)  Outcome: Progressing  Intervention: Promote Behavior and Impulse Control  Recent Flowsheet Documentation  Taken 2/2/2025 1616 by Tamie Fraser RN  De-Escalation Techniques: medication administered  Diversional Activity: television   Goal Outcome Evaluation:    Pt was visible in the lounge majority of the shift watching TV with peers. Presented with a blunted and flat affect. Pt denied pain or discomfort. Denied all mental health and psych symptoms. Nutrition and hydration adequate. Pt was pleasant upon assessments. Hygiene is poor.Pt was compliant with medication and contracted for safety. No behavioral concerns this shift.

## 2025-02-03 PROCEDURE — 250N000013 HC RX MED GY IP 250 OP 250 PS 637

## 2025-02-03 PROCEDURE — 124N000002 HC R&B MH UMMC

## 2025-02-03 PROCEDURE — 99207 PR NO BILLABLE SERVICE THIS VISIT: CPT | Mod: GC | Performed by: PSYCHIATRY & NEUROLOGY

## 2025-02-03 PROCEDURE — 99231 SBSQ HOSP IP/OBS SF/LOW 25: CPT | Mod: GC | Performed by: PSYCHIATRY & NEUROLOGY

## 2025-02-03 RX ADMIN — LISINOPRIL 40 MG: 10 TABLET ORAL at 07:51

## 2025-02-03 RX ADMIN — Medication 1 PATCH: at 07:55

## 2025-02-03 RX ADMIN — OLANZAPINE 5 MG: 5 TABLET, ORALLY DISINTEGRATING ORAL at 20:15

## 2025-02-03 RX ADMIN — Medication 25 MG: at 20:15

## 2025-02-03 RX ADMIN — ATORVASTATIN CALCIUM 40 MG: 20 TABLET, FILM COATED ORAL at 07:50

## 2025-02-03 RX ADMIN — CINACALCET 30 MG: 30 TABLET ORAL at 07:50

## 2025-02-03 RX ADMIN — SENNOSIDES AND DOCUSATE SODIUM 1 TABLET: 50; 8.6 TABLET ORAL at 07:50

## 2025-02-03 RX ADMIN — CLONIDINE HYDROCHLORIDE 0.1 MG: 0.1 TABLET ORAL at 07:51

## 2025-02-03 RX ADMIN — MELATONIN TAB 3 MG 3 MG: 3 TAB at 20:15

## 2025-02-03 RX ADMIN — CLONIDINE HYDROCHLORIDE 0.1 MG: 0.1 TABLET ORAL at 20:15

## 2025-02-03 RX ADMIN — METOPROLOL SUCCINATE 50 MG: 50 TABLET, EXTENDED RELEASE ORAL at 07:51

## 2025-02-03 RX ADMIN — AMLODIPINE BESYLATE 10 MG: 5 TABLET ORAL at 07:51

## 2025-02-03 RX ADMIN — FUROSEMIDE 40 MG: 40 TABLET ORAL at 07:51

## 2025-02-03 ASSESSMENT — ACTIVITIES OF DAILY LIVING (ADL)
ADLS_ACUITY_SCORE: 66
ADLS_ACUITY_SCORE: 66
DRESS: INDEPENDENT
ADLS_ACUITY_SCORE: 76
ADLS_ACUITY_SCORE: 76
ADLS_ACUITY_SCORE: 66
ADLS_ACUITY_SCORE: 66
ADLS_ACUITY_SCORE: 76
ADLS_ACUITY_SCORE: 76
ADLS_ACUITY_SCORE: 66
ADLS_ACUITY_SCORE: 66
DRESS: INDEPENDENT
ADLS_ACUITY_SCORE: 66
ADLS_ACUITY_SCORE: 76
LAUNDRY: UNABLE TO COMPLETE
ADLS_ACUITY_SCORE: 76
HYGIENE/GROOMING: INDEPENDENT;PROMPTS
ADLS_ACUITY_SCORE: 76
ADLS_ACUITY_SCORE: 66
ORAL_HYGIENE: INDEPENDENT
HYGIENE/GROOMING: INDEPENDENT;PROMPTS
ADLS_ACUITY_SCORE: 76
ADLS_ACUITY_SCORE: 76
ADLS_ACUITY_SCORE: 66
ADLS_ACUITY_SCORE: 76
LAUNDRY: UNABLE TO COMPLETE
ADLS_ACUITY_SCORE: 76
ADLS_ACUITY_SCORE: 66
ORAL_HYGIENE: INDEPENDENT

## 2025-02-03 NOTE — PROVIDER NOTIFICATION
02/03/25 1010   Individualization/Patient Specific Goals   Patient Personal Strengths expressive of emotions;positive vocational history   Patient Vulnerabilities housing insecurity;lacks insight into illness;poor impulse control   Interprofessional Rounds   Summary Discussed patient progress and pending discharge to [facility] once pt's guardianship is re-established.   Participants nursing;CTC;psychiatrist;other (see comments);pharmacy   Behavioral Team Discussion   Participants Dr. Smith; Dr. Grimm; Catherine Hammond RN; Kylee Becerra [facility]; medical students; pharmacy   Progress Pt has progressed   Anticipated length of stay 60+ days   Continued Stay Criteria/Rationale Placement   Medical/Physical See H&P   Precautions See below   Plan Psychiatric assessment/Medication management. Therapeutic Milieu. Individual care planning and after care planning. Patient to participate in unit groups and activities. Individual and group support on unit.   Safety Plan Completed by unit therapist   Anticipated Discharge Disposition another healthcare facility     PRECAUTIONS AND SAFETY    Behavioral Orders   Procedures    Code 1 - Restrict to Unit    Routine Programming     As clinically indicated    Status 15     Every 15 minutes.    Status Individual Observation     Patient SIO status reviewed with team/RN.  Please also refer to RN/team documentation for add'l detail.    -SIO staff to monitor following which have contributed to patient being on SIO:  Patient intrusiveness to other patients  -Possible interventions SIO staff could use to support patient's treatment progress:  Redirection  -When following observed, team will review discontinuation of SIO:  Improvement in intrusive behavior.     Order Specific Question:   CONTINUOUS 24 hours / day     Answer:   Other     Order Specific Question:   Specify distance     Answer:   10 feet     Order Specific Question:   Indications for SIO     Answer:   Severe intrusiveness        Safety  Safety WDL: WDL  [facility]  Observed Behavior: calm  Observed Behavior (Comment): sitting  Airway Safety Measures: other (see comments)  Safety Measures: safety rounds completed  Diversional Activity: television  De-Escalation Techniques: medication administered  Suicidality: Status 15  Assault: status 15  Elopement Assessment: Loitering near exit doors, Statements about wanting to leave  Elopement Interventions: status 15  Sexual: status 15

## 2025-02-03 NOTE — PLAN OF CARE
Problem: Sleep Disturbance  Goal: Adequate Sleep/Rest  Outcome: Progressing   Goal Outcome Evaluation:`  Patient appears to have slept for 3.5 hours. Intermittently irritable when awake, patient made incoherent requests/statements but would become upset whenever writer attempted to redirect. He was initially receptive to another staff member but eventually become upset with her as well. Patient would  calmly return to own room and sit on the edge of the bed or get in bed. DARREN staff was utilized towards the morning hours but patient is now calmer. No noted signs of pain or discomfort. No requested or administered prn's. Routine safety checks completed q 15 minutes.

## 2025-02-03 NOTE — PLAN OF CARE
The pt was visible in the milieu for most of the shift, engaging in watching TV and interacting with selected peers. The pt exhibited a calm mood, with a flat affect, and occasionally displayed intermittent confusion.  However, the pt appropriately engaged in social activities with peers. The pt denied any MH concerns and contracted for safety. The pt's appetite was good and maintained adequate fluid intake. The pt complied with med and care.    BP (!) 142/82 (BP Location: Right arm, Patient Position: Sitting, Cuff Size: Adult Large)   Pulse 63   Temp 97.8  F (36.6  C) (Oral)   Resp 16   Wt 115.3 kg (254 lb 1.6 oz)   SpO2 94%   BMI 41.01 kg/m         Problem: Psychotic Signs/Symptoms  Goal: Improved Mood Symptoms (Psychotic Signs/Symptoms)  Outcome: Progressing  Intervention: Optimize Emotion and Mood  Recent Flowsheet Documentation  Taken 2/3/2025 1700 by Jarrod Keenan, RN  Supportive Measures: active listening utilized  Diversional Activity: television  Intervention: Optimize Emotion and Mood  Recent Flowsheet Documentation  Taken 2/3/2025 1700 by Jarrod Keenan RN  Supportive Measures: active listening utilized  Diversional Activity: television   Goal Outcome Evaluation:    Plan of Care Reviewed With: patient

## 2025-02-03 NOTE — PLAN OF CARE
"Pt denies SI.  Pt was asking for socks.  Asked pt if he wanted his PAM HOSE on.  Pt said yes.  Pt needed some directions about sitting on his bed so can put PAM HOSE on.  After a few redirection pt sat on his bed and PAM HOSE put on.  Pt talked to her daughter on phone this am and wrote down # for his son  in law.  Pt brought # to desk and it was too many numbers.  Called his daughter back and clarified phone number.  Pt at times argumentative and saying things that don't make sense.  Pt eating meals.  In lounge most of the shift.    Problem: Adult Behavioral Health Plan of Care  Goal: Plan of Care Review  Outcome: Not Progressing  Goal: Patient-Specific Goal (Individualization)  Description: You can add care plan individualizations to a care plan. Examples of Individualization might be:  \"Parent requests to be called daily at 9am for status\", \"I have a hard time hearing out of my right ear\", or \"Do not touch me to wake me up as it startles  me\".  Outcome: Not Progressing  Goal: Adheres to Safety Considerations for Self and Others  Outcome: Not Progressing  Intervention: Develop and Maintain Individualized Safety Plan  Recent Flowsheet Documentation  Taken 2/3/2025 1200 by Catherine Szymanski RN  Safety Measures: safety rounds completed  Intervention: Develop and Maintain Individualized Safety Plan  Recent Flowsheet Documentation  Taken 2/3/2025 1200 by Catherine Szymanski, RN  Safety Measures: safety rounds completed  Goal: Absence of New-Onset Illness or Injury  Outcome: Not Progressing  Intervention: Identify and Manage Fall Risk  Recent Flowsheet Documentation  Taken 2/3/2025 1200 by Catherine Szymanski RN  Safety Measures: safety rounds completed  Goal: Optimized Coping Skills in Response to Life Stressors  Outcome: Not Progressing     Problem: Psychotic Signs/Symptoms  Goal: Improved Behavioral Control (Psychotic Signs/Symptoms)  Outcome: Not Progressing  Goal: Optimal Cognitive Function (Psychotic Signs/Symptoms)  Outcome: " Not Progressing  Goal: Increased Participation and Engagement (Psychotic Signs/Symptoms)  Outcome: Not Progressing  Goal: Improved Mood Symptoms (Psychotic Signs/Symptoms)  Outcome: Not Progressing  Goal: Improved Psychomotor Symptoms (Psychotic Signs/Symptoms)  Outcome: Not Progressing  Goal: Decreased Sensory Symptoms (Psychotic Signs/Symptoms)  Outcome: Not Progressing  Goal: Improved Sleep (Psychotic Signs/Symptoms)  Outcome: Not Progressing  Goal: Enhanced Social, Occupational or Functional Skills (Psychotic Signs/Symptoms)  Outcome: Not Progressing     Problem: Depression  Goal: Improved Mood  Outcome: Not Progressing     Problem: Suicidal Behavior  Goal: Suicidal Behavior is Absent or Managed  Outcome: Not Progressing     Problem: Sleep Disturbance  Goal: Adequate Sleep/Rest  Outcome: Not Progressing     Problem: Seclusion/Restraint, Behavioral  Goal: Seclusion/Behavioral Restraint Goal: Absence of Harm or Injury  Outcome: Not Progressing  Intervention: Implement Least Restrictive Safety Strategies  Recent Flowsheet Documentation  Taken 2/3/2025 1200 by Catherine Szymanski RN  Safety Measures: safety rounds completed     Problem: Pain Acute  Goal: Optimal Pain Control and Function  Outcome: Not Progressing     Problem: Manic or Hypomanic Signs/Symptoms  Goal: Improved Impulse Control (Manic/Hypomanic Signs/Symptoms)  Outcome: Not Progressing  Goal: Optimized Cognitive Function (Manic/Hypomanic Signs/Symptoms)  Outcome: Not Progressing  Goal: Improved Mood Symptoms (Manic/Hypomanic Signs/Symptoms)  Outcome: Not Progressing  Goal: Optimized Nutrition Intake (Manic or Hypomanic Signs/Symptoms)  Outcome: Not Progressing  Goal: Improved Psychomotor Symptoms (Manic/Hypomanic Signs/Symptoms)  Outcome: Not Progressing  Goal: Improved Sleep (Manic/Hypomanic Signs/Symptoms)  Outcome: Not Progressing  Goal: Enhanced Social, Occupational or Functional Skills (Manic/Hypomanic Signs/Symptoms)  Outcome: Not Progressing      Problem: Anxiety  Goal: Anxiety Reduction or Resolution  Outcome: Not Progressing   Goal Outcome Evaluation:

## 2025-02-03 NOTE — PLAN OF CARE
Team Note Due:  Monday    Assessment/Intervention/Current Symtoms and Care Coordination:  Chart review and met with team, discussed pt progress, symptomology, and response to treatment.  Discussed the discharge plan and any potential impediments to discharge. Clifford Aaron was emergency guardian/conservator until 01/20/2025. A petition for permanent guardianship/conservatorship has been filed in addition to a petition requesting emergency guardianship/conservatorship be reinstated until the permanent guardianship/conservatorship hearing on 2/27/25.    No changes. Awaiting update on status of guardianship before pt can move to [facility].    Discharge Plan or Goal:  Memory care facility     Barriers to Discharge:  Patient requires further psychiatric stabilization due to current symptomology, medication management with changes subject to provider, coordination with outside supports, and aftercare planning. Pt is under civil commitment.     Referral Status:  Memory Care approved:  [facility]. Tour completed 11/30. Per Clifford on 1/8, she would like to move forward with a referral. Approved to move in February but waiting on guardianship to be re-established before a discharge date can be set.    Memory Care facilities currently in process:  [facility]. Tour completed 11/27.  Vrea (Exec Director): [phone]  [facility]. Tour scheduled 1/8/25. Have immediate openings and will accept EW. Family not requesting referral yet.  [facility]. Per Clifford on 1/20, she would like to move forward with a referral. Records sent 1/20.  [email or web address]    Memory Care facilities pending family review:  [facility].  [facility].  [facility].  [facility].  [facility].    Memory Care facilities declined:  [facility] (private pay only, no  EW).  [facility] (private pay only, no EW).  [facility] (no openings).  [facility]. Tour completed 11/29. Records sent 12/2/24. Declined 12/19/24 (don't feel they are an appropriate facility for pt's needs).  Lorraine (): [email or web address]; [phone]  Isatu (director of nursing): [email or web address]  [facility].  Denied 12/26 (concerned about dementia with psychosis, history of hallucinations, high elopement risk, still on 1:1).  Nikki Noel: [email or web address]; Office [phone], Fax [phone]   [facility]. Not accepting EW as of 1/7/25.    Legal Status:  MI Commitment with [facility] [place]  File Number: [id number]  Start and expiration date of commitment: 10/24/24 - 04/24/25    [facility] meds: Haldol, Clozaril, Risperdal, Invega, Zyprexa, Seroquel, Abilify    PPS/CM:  Shelby Chowdary: [phone]  [email or web address]    Contacts:  Maria C Aaron (Daughter): [phone]   Clifford Agnieszka (ex-wife): [phone]     No Del Angel (guardianship/conservatorship ): [phone]  [email or web address]    Caro Ross ([facility] probate court visitor for guardianship): [phone]  [email or web address]    Derrell Duff ([employer] ): [phone]  [email or web address]     Upcoming Meetings and Dates/Important Information and next steps:  Schedule ambulance transport when discharge is confirmed  Coordinate discharge/paperwork with Ofelia [facility]AS  Send PD/Discharge Summary to WAYNE  Send PD/COS to Lonnie Cannon    Provisional Discharge and Change of Status needed at discharge

## 2025-02-03 NOTE — PROGRESS NOTES
----------------------------------------------------------------------------------------------------------  [facility]  Psychiatry Progress Note  Hospital Day #114     Interim History:     The patient's care was discussed with the treatment team and chart notes were reviewed.    Identifier: Estevan Aaron is a 64 year old male with previous psychiatric diagnoses of  generalized anxiety disorder, Neurocognitive disorder, admitted from the ED 10/12/2024 due to concern for HI and psychosis, which now have resolved, in the psychosocial stressors (recent divorce)     Sleep: 3.5 hours (02/03/25 0600)  Scheduled medications: Took all scheduled medications as prescribed  Psychiatric PRN medications:  none     Staff Report:   No acute events over the weekend. Seen with bright affect and less argumentative with staff. Interacts and engages with select peers.      Subjective:     Patient Interview:  Santos was interviewed in the conference room. Asks about thermometer - doesn't clarify when asked with follow-up questions.   Says that his family visited this weekend and notes of the encounter having gone well.  Otherwise notes that he's been doing well, no concerns or questions at this time.     ROS:  See above     Objective:     Vitals:  /76   Pulse 60   Temp 98  F (36.7  C)   Resp 16   Wt 115.3 kg (254 lb 1.6 oz)   SpO2 94%   BMI 41.01 kg/m      Allergies:  No Known Allergies    Current Medications:  Scheduled:  Current Facility-Administered Medications   Medication Dose Route Frequency Provider Last Rate Last Admin    acetaminophen (TYLENOL) tablet 650 mg  650 mg Oral Q4H PRN Nikki Caceres MD   650 mg at 01/09/25 0228    alum & mag hydroxide-simethicone (MAALOX) suspension 30 mL  30 mL Oral Q4H PRN Nikki Caceres MD   30 mL at 10/17/24 0837    amLODIPine (NORVASC) tablet 10 mg  10 mg Oral Daily Presley Barrera MD   10 mg at 02/03/25 0751    atorvastatin  (LIPITOR) tablet 40 mg  40 mg Oral Daily Nikki Caceres MD   40 mg at 02/03/25 0750    cholecalciferol (VITAMIN D3) capsule 1,250 mcg  1,250 mcg Oral Q7 Days Evelia Grimm DO   1,250 mcg at 01/28/25 1146    cinacalcet (SENSIPAR) tablet 30 mg  30 mg Oral Daily Presley Barrera MD   30 mg at 02/03/25 0750    cloNIDine (CATAPRES) tablet 0.1 mg  0.1 mg Oral BID Presley Barrera MD   0.1 mg at 02/03/25 0751    diclofenac (VOLTAREN) 1 % topical gel 2 g  2 g Topical 4x Daily PRN Rocío Lopez MD   2 g at 12/22/24 2032    furosemide (LASIX) tablet 40 mg  40 mg Oral Daily Presley Barrera MD   40 mg at 02/03/25 0751    gabapentin (NEURONTIN) capsule 100 mg  100 mg Oral Q6H PRN Nikki Caceres MD   100 mg at 12/24/24 0046    hydrocortisone (CORTAID) 0.5 % cream   Topical Daily PRN Savi Chamorro MD        Lidocaine (LIDOCARE) 4 % Patch 1 patch  1 patch Transdermal Q24H PRN Rocío Lopez MD   1 patch at 12/22/24 2023    lisinopril (ZESTRIL) tablet 40 mg  40 mg Oral Daily Presley Barrera MD   40 mg at 02/03/25 0751    melatonin tablet 3 mg  3 mg Oral At Bedtime Presley Barrera MD   3 mg at 02/02/25 2105    metoprolol succinate ER (TOPROL XL) 24 hr tablet 50 mg  50 mg Oral Daily Presley Barrera MD   50 mg at 02/03/25 0751    nicotine (NICODERM CQ) 14 MG/24HR 24 hr patch 1 patch  1 patch Transdermal Daily Evelia Grimm DO   1 patch at 02/03/25 0755    nicotine (NICORETTE) gum 2 mg  2 mg Buccal Q1H PRN González Garcia MD   2 mg at 10/24/24 1254    OLANZapine zydis (zyPREXA) ODT tab 5 mg  5 mg Oral At Bedtime González Garcia MD   5 mg at 02/02/25 2105    Or    OLANZapine (zyPREXA) injection 10 mg  10 mg Intramuscular At Bedtime González Garcia MD   10 mg at 01/12/25 2211    OLANZapine (zyPREXA) tablet 5 mg  5 mg Oral TID PRN Evelia Grimm DO   5 mg at 01/05/25 1645    Or    OLANZapine (zyPREXA) injection 5 mg  5 mg Intramuscular TID PRN Evelia Grimm DO        polyethylene  glycol (MIRALAX) Packet 17 g  17 g Oral Daily PRN Nikki Caceres MD        senna-docusate (SENOKOT-S/PERICOLACE) 8.6-50 MG per tablet 1 tablet  1 tablet Oral Daily Evelia Grimm DO   1 tablet at 02/03/25 0750    traZODone (DESYREL) half-tab 25 mg  25 mg Oral At Bedtime Rocío Lopez MD   25 mg at 02/02/25 2105    traZODone (DESYREL) half-tab 25 mg  25 mg Oral At Bedtime PRN Evelia Grimm DO   25 mg at 12/24/24 0046       PRN:  Current Facility-Administered Medications   Medication Dose Route Frequency Provider Last Rate Last Admin    acetaminophen (TYLENOL) tablet 650 mg  650 mg Oral Q4H PRN Nikki Caceres MD   650 mg at 01/09/25 0228    alum & mag hydroxide-simethicone (MAALOX) suspension 30 mL  30 mL Oral Q4H PRN Nikki Caceres MD   30 mL at 10/17/24 0837    amLODIPine (NORVASC) tablet 10 mg  10 mg Oral Daily Presley Barrera MD   10 mg at 02/03/25 0751    atorvastatin (LIPITOR) tablet 40 mg  40 mg Oral Daily Nikki Caceres MD   40 mg at 02/03/25 0750    cholecalciferol (VITAMIN D3) capsule 1,250 mcg  1,250 mcg Oral Q7 Days Evelia Grimm DO   1,250 mcg at 01/28/25 1146    cinacalcet (SENSIPAR) tablet 30 mg  30 mg Oral Daily Presley Barrera MD   30 mg at 02/03/25 0750    cloNIDine (CATAPRES) tablet 0.1 mg  0.1 mg Oral BID Presley Barrera MD   0.1 mg at 02/03/25 0751    diclofenac (VOLTAREN) 1 % topical gel 2 g  2 g Topical 4x Daily PRN Rocío Lopez MD   2 g at 12/22/24 2032    furosemide (LASIX) tablet 40 mg  40 mg Oral Daily Presley Barrera MD   40 mg at 02/03/25 0751    gabapentin (NEURONTIN) capsule 100 mg  100 mg Oral Q6H PRN Nikki Caceres MD   100 mg at 12/24/24 0046    hydrocortisone (CORTAID) 0.5 % cream   Topical Daily PRN Savi Chamorro MD        Lidocaine (LIDOCARE) 4 % Patch 1 patch  1 patch Transdermal Q24H PRN Rocío Lopez MD   1 patch at 12/22/24 2023    lisinopril (ZESTRIL) tablet 40 mg  40 mg Oral Daily  Presley Barrera MD   40 mg at 02/03/25 0751    melatonin tablet 3 mg  3 mg Oral At Bedtime Presley Barrera MD   3 mg at 02/02/25 2105    metoprolol succinate ER (TOPROL XL) 24 hr tablet 50 mg  50 mg Oral Daily Presley Barrera MD   50 mg at 02/03/25 0751    nicotine (NICODERM CQ) 14 MG/24HR 24 hr patch 1 patch  1 patch Transdermal Daily Evelia Grimm DO   1 patch at 02/03/25 0755    nicotine (NICORETTE) gum 2 mg  2 mg Buccal Q1H PRN González Garcia MD   2 mg at 10/24/24 1254    OLANZapine zydis (zyPREXA) ODT tab 5 mg  5 mg Oral At Bedtime González Garcia MD   5 mg at 02/02/25 2105    Or    OLANZapine (zyPREXA) injection 10 mg  10 mg Intramuscular At Bedtime González Garcia MD   10 mg at 01/12/25 2211    OLANZapine (zyPREXA) tablet 5 mg  5 mg Oral TID PRN Evelia Grimm DO   5 mg at 01/05/25 1645    Or    OLANZapine (zyPREXA) injection 5 mg  5 mg Intramuscular TID PRN Evelia Grimm DO        polyethylene glycol (MIRALAX) Packet 17 g  17 g Oral Daily PRN Nikki Caceres MD        senna-docusate (SENOKOT-S/PERICOLACE) 8.6-50 MG per tablet 1 tablet  1 tablet Oral Daily Evelia Grimm DO   1 tablet at 02/03/25 0750    traZODone (DESYREL) half-tab 25 mg  25 mg Oral At Bedtime Rocío Lopez MD   25 mg at 02/02/25 2105    traZODone (DESYREL) half-tab 25 mg  25 mg Oral At Bedtime PRN Evelia Grimm DO   25 mg at 12/24/24 0046     Labs and Imaging:  Data this admission:  - CBC unremarkable  - CMP unremarkable  - TSH normal  - UDS negative  - Vit D low  - Hgb A1c 5.9 (10/13/24)  - Lipids unremarkable  - Vit B12 normal  - Folate normal  - Urinalysis unremarkable  - EKG normal sinus rhythm, QTc 390 ms  - Head CT showed no acute changes  - HIV non reactive  - Treponema antibody non reactive  - ESR wnl   - Ceruloplasmin wnl   - BOOGIE negative  - Lyme negative      Parathyroid hormone:   85 (10/13)     Calcium:  11.4 (10/14) -> 10.3 (10/16) -> 9.8 (10/19) -> 10.6 (10/21) -> 10.3 (10/23)     Albumin:  4.3 (10/14)  "-->  4.3 (10/16) -> 4.1 (10/17) -> 4.2 (10/19) -> 4.5 (10/21) -> 4.3 (10/23)      Mental Status Exam:     Oriented to:  Person/Self  General:  Awake and Alert  Appearance:  appears stated age, Grooming is adequate, Dressed in own clothing, and overweight  Behavior/Attitude:  Confused   Eye Contact: Appropriates   Psychomotor: Normal and No evidence of tics, dystonia, or tardive dyskinesia  no catatonia present  Speech:  appropriate volume/tone  Language: Fluent in English with appropriate syntax and vocabulary.  Mood:  \"good\"   Affect:  appropriate and congruent with mood  Thought Process:  disorganized, confused, and incoherent  Thought Content:   No SI/HI/AH/VH; No apparent delusions  Associations:  loose  Insight:  impaired due to neurocognitive disorder   Judgment:  impaired due to neurocognitive disorder  Impulse control: impaired  Attention Span:  inattentive  Concentration:   impaired by neurocognitive disorder  Recent and Remote Memory:   remote memory intact, recent memory impaired  Fund of Knowledge: average  Muscle Strength and Tone: normal  Gait and Station: Normal     Psychiatric Assessment     Estevan Aaron is a 64 year old male with previous psychiatric diagnoses of GEORGINA admitted from the ER on 10/12/2024 due to concern for HI and psychosis in the context of medical issues (hyperthyroidism, hypercalcemia), psychosocial stressors including with recent divorce and selling his home. This is the patient's first psychiatric hospitalization. The MSE on admission was pertinent for a thought process which was perseverative, circumstantial, tangential, disorganized and tangential; with rambling and looseness of associations. Psychological contributions to presentation included lack of insight. Social factors contributing to presentation included isolation, recent divorce, selling his house, and moving from hotel to hotel. Biological contributions to presentation included a history of hyperparathyroidism with " chronic hypercalcemia per charts as well as a history of methamphetamine use per collateral from ex-wife.     Per collateral from ex-wife, Santos has had paranoid ideations since they first met. He has always felt like people are out to get him or trying to rip him off. She says that he has had visual hallucinations (he will point things out that the rest of the family can't see) for a long time, but the family would just go along with him to avoid making him angry. This timeline and presentation could be consistent with diagnosis of paranoid personality disorder. Things began to worsen around the beginning of the COVID pandemic, when Santos became more isolated and the family started to notice cognitive issues such as impaired memory. Other things that could be contributing to his presentation is hyperparathyroidism with hypercalcemia. However, patient's calcium returned to normal limits on 10/16, and patient continues to have disorganized behavior unrelated to calcium elevation, so likely this is not a significant contributing factor to patient's presentation.      Currently, Santos is at times disoriented, but easily re-directable. Presents with some difficulty of word articulation, which contributes to his frustration when interacting with psych team, as he finds it difficult to explain himself. He is able to take care of his ADLs without much assistance and he is mostly independent in the unit. On the other hand, his confused behavior tend to increase in the evenings, seemingly related to  Neurocognitive Disorder diagnosis, and this could evolved to be his new baseline. Either way, Santos does not present as a danger to himself or other so his SIO was discontinued. Santos does not present with any new episodes of physical agitation and is able to attend groups and interact with peers without major altercations. Patient currently is stable with current neuroleptic dose, patient probably wont benefit from any modification in  current therapy.          Psychiatric Plan by Diagnosis     Today's changes:  - No medication changes.     # Major Neurocognitive Disorder  1. Medications:  - Olanzapine 5 mg at bedtime     3. Additional Plans:  - Patient will be treated in therapeutic milieu with appropriate individual and group therapies as described  - Pending memory facility placement.      # Unspecified anxiety vs Generalized Anxiety Disorder  - Monitor for symptoms.  - Fluoxetine held due to suspicion of ongoing manic symptoms     Psychiatric Hospital Course:      Estevan Aaron was admitted to [facility] on a 72 hour hold.   Medications:  PTA fluoxetine was held due to concern for worsening of zelalem   New medications started at the time of admission include Zyprexa.   Increased olanzapine 10 mg at bedtime was to 10 mg BID (10/14)   Increased olanzapine 10 mg BID to 10 mg during day and 15 mg at bedtime (10/15)  10/21: increased olanzapine pm dose from 15 to 20 mg  10/23: started melatonin 3 mg at 7 pm to help patient with circadian rhythm as he has been staying up throughout the night and sleeping a lot during the day and there is some concern for delirium   10/24: consulted anesthesia for MRI brain   10/28: MRI brain complete, decrease AM olanzapine from 10 to 5 mg  10/29: Morning olanzapine decreased to 2.5 mg, evening olanzapine decreased to 15 mg   11/1: Decreased evening olanzapine to 10 mg   11/4: Decreased evening olanzapine to 7.5 mg   11/5: Decreased evening olanzapine to 5 mg   11/11: Moved morning dose of olanzapine to the afternoon as patient appears more agitated in the afternoons/evenings  11/21: Started memantine 5 mg daily   11/26: Discontinued olanzapine 2.5 mg during the afternoon  12/2:   Discontinued memantine 5 mg, daily    The risks, benefits, alternatives, and side effects were discussed and understood by the patient and other caregivers.     Medical Assessment and Plan     Medical diagnoses to be addressed this  admission:    # Hip Pain  - New right hip pain, and mild pain in multiple joints. Moderate response to topical antiinflammatory gel and lidocaine patch.   - Medicine consulted for recommendations 12/20    # CHF  # Hypertension  - Continue PTA medications  Furosemide 40 mg daily  Lisinopril 40 mg daily  Metoprolol 50 mg daily  Amlodipine 10 mg daily  Clonidine 0.1 mg BID      # Hyperlipidemia  - Continue PTA Atorvastatin 40 mg     # Primary Hyperparathyroidism  # Hypercalcemia, hypophosphoremia   Increased Ca level to 10.9 and decreased Ph level to 2.3 in the ED   - Consulted endocrinology, who started cinacalcet 30 mg BID on 10/13  - 10/16 endocrinology recommended continuing to trend calcium and albumin to make sure patient does not become hypocalcemic and recommended holding cinacalcet   - 10/17, calcium and albumin wnl, endo recommended cinacalcet 30 mg once daily   - 10/21, per endo continue cincaclcet and recheck calcium and albumin on October 24  - 10/23: per endo, patient needs to follow up outpatient for further management of hyperparathyroidism     # Rhinorrhea  1/15 currently multiple COVID infections on unit. No other symptoms of COVID noted. COVID PCR - on 1/13.    Medical course: Patient was physically examined by the ED prior to being transferred to the unit and was found to be medically stable and appropriate for admission.      Consults: Psychiatry, Endocrinology (follow-up of hyperthyroidism / hypercalcemia and hypertension), neurology     Checklist     Legal Status: Committed   [facility] meds: Haldol, Clozaril, Risperdal, Invega, Zyprexa, Seroquel, Abilify    Safety Assessment:   Behavioral Orders   Procedures    Code 1 - Restrict to Unit    Routine Programming     As clinically indicated    Status 15     Every 15 minutes.    Status Individual Observation     Patient SIO status reviewed with team/RN.  Please also refer to RN/team documentation for add'l detail.    -SIO staff to monitor following which  have contributed to patient being on SIO:  Patient intrusiveness to other patients  -Possible interventions SIO staff could use to support patient's treatment progress:  Redirection  -When following observed, team will review discontinuation of SIO:  Improvement in intrusive behavior.     Order Specific Question:   CONTINUOUS 24 hours / day     Answer:   Other     Order Specific Question:   Specify distance     Answer:   10 feet     Order Specific Question:   Indications for SIO     Answer:   Severe intrusiveness       Risk Assessment:  Risk for harm is low  Risk factors: impulsive and past behaviors  Protective factors: family      SIO: No    Disposition: Pending assessment for memory care facility.       Attestations     This patient was seen and discussed with my attending physician.  Evelia Grimm DO  PGY-1 Psychiatry Resident      Attestation:  This patient has been seen and evaluated by me, Pete Smith MD.  I have discussed this patient with the house staff team including the resident and/or medical student and I agree with the findings and plan in this note.    I have reviewed today's vital signs, medications, labs and imaging. Pete Smith MD , PhD.

## 2025-02-03 NOTE — PLAN OF CARE
BEH IP Unit Acuity Rating Score (UARS)  Patient is given one point for every criteria they meet.    CRITERIA SCORING   On a 72 hour hold, court hold, committed, stay of commitment, or revocation. 1    Patient LOS on BEH unit exceeds 20 days. 1  LOS: 114   Patient under guardianship, 55+, otherwise medically complex, or under age 11. 1   Suicide ideation without relief of precipitating factors. 0   Current plan for suicide. 0   Current plan for homicide. 0   Imminent risk or actual attempt to seriously harm another without relief of factors precipitating the attempt. 1   Severe dysfunction in daily living (ex: complete neglect for self care, extreme disruption in vegetative function, extreme deterioration in social interactions). 1   Recent (last 7 days) or current physical aggression in the ED or on unit. 0   Restraints or seclusion episode in past 72 hours. 0   Recent (last 7 days) or current verbal aggression, agitation, yelling, etc., while in the ED or unit. 0   Active psychosis. 1   Need for constant or near constant redirection (from leaving, from others, etc).  0   Intrusive or disruptive behaviors. 0   Patient requires 3 or more hours of individualized nursing care per 8-hour shift (i.e. for ADLs, meds, therapeutic interventions). 0   TOTAL 6

## 2025-02-04 ENCOUNTER — DOCUMENTATION ONLY (OUTPATIENT)
Dept: OTHER | Facility: CLINIC | Age: 65
End: 2025-02-04
Payer: MEDICARE

## 2025-02-04 PROCEDURE — 250N000013 HC RX MED GY IP 250 OP 250 PS 637

## 2025-02-04 PROCEDURE — 99231 SBSQ HOSP IP/OBS SF/LOW 25: CPT | Mod: GC | Performed by: PSYCHIATRY & NEUROLOGY

## 2025-02-04 PROCEDURE — 124N000002 HC R&B MH UMMC

## 2025-02-04 RX ADMIN — CLONIDINE HYDROCHLORIDE 0.1 MG: 0.1 TABLET ORAL at 08:22

## 2025-02-04 RX ADMIN — Medication 25 MG: at 20:35

## 2025-02-04 RX ADMIN — OLANZAPINE 5 MG: 5 TABLET, ORALLY DISINTEGRATING ORAL at 20:35

## 2025-02-04 RX ADMIN — CINACALCET 30 MG: 30 TABLET ORAL at 08:22

## 2025-02-04 RX ADMIN — Medication 1 PATCH: at 08:26

## 2025-02-04 RX ADMIN — CLONIDINE HYDROCHLORIDE 0.1 MG: 0.1 TABLET ORAL at 20:35

## 2025-02-04 RX ADMIN — Medication 1250 MCG: at 12:56

## 2025-02-04 RX ADMIN — FUROSEMIDE 40 MG: 40 TABLET ORAL at 08:22

## 2025-02-04 RX ADMIN — ATORVASTATIN CALCIUM 40 MG: 20 TABLET, FILM COATED ORAL at 08:22

## 2025-02-04 RX ADMIN — MELATONIN TAB 3 MG 3 MG: 3 TAB at 20:35

## 2025-02-04 RX ADMIN — SENNOSIDES AND DOCUSATE SODIUM 1 TABLET: 50; 8.6 TABLET ORAL at 08:22

## 2025-02-04 RX ADMIN — LISINOPRIL 40 MG: 10 TABLET ORAL at 08:22

## 2025-02-04 RX ADMIN — AMLODIPINE BESYLATE 10 MG: 5 TABLET ORAL at 08:22

## 2025-02-04 RX ADMIN — METOPROLOL SUCCINATE 50 MG: 50 TABLET, EXTENDED RELEASE ORAL at 08:22

## 2025-02-04 ASSESSMENT — ACTIVITIES OF DAILY LIVING (ADL)
ADLS_ACUITY_SCORE: 76
DRESS: INDEPENDENT
ADLS_ACUITY_SCORE: 76
HYGIENE/GROOMING: INDEPENDENT;PROMPTS
ADLS_ACUITY_SCORE: 76
DRESS: INDEPENDENT
ADLS_ACUITY_SCORE: 76
ADLS_ACUITY_SCORE: 76
ORAL_HYGIENE: INDEPENDENT
ORAL_HYGIENE: INDEPENDENT
LAUNDRY: UNABLE TO COMPLETE
ADLS_ACUITY_SCORE: 76
LAUNDRY: UNABLE TO COMPLETE
ADLS_ACUITY_SCORE: 76
HYGIENE/GROOMING: INDEPENDENT;PROMPTS
ADLS_ACUITY_SCORE: 76
ADLS_ACUITY_SCORE: 76

## 2025-02-04 NOTE — PLAN OF CARE
Team Note Due:  Monday    Assessment/Intervention/Current Symtoms and Care Coordination:  Chart review and met with team, discussed pt progress, symptomology, and response to treatment.  Discussed the discharge plan and any potential impediments to discharge. Clifford Aaron is emergency guardian/conservator until 02/27/2025. A petition for permanent guardianship/conservatorship has been filed and a hearing is scheduled for 2/27/25.    Amended letters for guardianship have been signed. Sent copy to [facility] to update EPIC.    Left vm for Vera with [facility] requesting any paperwork the hospital will need to complete to facilitate pt's move.    Received update from Clifford she will be signing papers for [facility] on 2/19 and pt will likely plan to move in the following Monday 2/24.     Updated  Shelby on guardianship and tentative plan for pt to move on 2/24.    Discharge Plan or Goal:  Memory care facility     Barriers to Discharge:  Patient requires further psychiatric stabilization due to current symptomology, medication management with changes subject to provider, coordination with outside supports, and aftercare planning. Pt is under civil commitment.     Referral Status:  Memory Care approved:  [facility]. Tour completed 11/30. Per Clifford on 1/8, she would like to move forward with a referral. Tentative move in date is 2/24/25.  Vera (Exec Director): [phone]      Memory Care facilities currently in process:  [facility]. Tour completed 11/27.  [facility]. Tour scheduled 1/8/25. Have immediate openings and will accept EW. Family not requesting referral yet.  [facility]. Per Clifford on 1/20, she would like to move forward with a referral. Records sent 1/20.  [email or web address]    Memory Care facilities pending family review:  [facility].  [facility].  [facility].  [facility].  [facility].    Memory Care facilities declined:  [facility] (private pay only, no EW).  [facility] (private pay only, no EW).  [facility] (no openings).  [facility]. Tour completed 11/29. Records sent 12/2/24. Declined 12/19/24 (don't feel they are an appropriate facility for pt's needs).  Lorraine (): [email or web address]; [phone]  Isatu (director of nursing): [email or web address]  [facility].  Denied 12/26 (concerned about dementia with psychosis, history of hallucinations, high elopement risk, still on 1:1).  Nikki Noel: [email or web address]; Office [phone], Fax [phone]   [facility]. Not accepting EW as of 1/7/25.    Legal Status:  MI Commitment with [facility]  File Number: [id number]  Start and expiration date of commitment: 10/24/24 - 04/24/25    [facility] meds: Haldol, Clozaril, Risperdal, Invega, Zyprexa, Seroquel, Abilify    PPS/CM:  Shelby Chowdary: [phone]  [email or web address]    Contacts:  Maria C Agnieszka (Daughter): [phone]   Clifford Agnieszka (ex-wife): [phone]     No Del Angel (guardianship/conservatorship ): [phone]  [email or web address]    Caro Ross ([facility] probate court visitor for guardianship): [phone]  [email or web address]    Derrell Duff ([employer] ): [phone]  [email or web address]     Upcoming Meetings and Dates/Important Information and next steps:  Schedule ambulance transport when discharge is confirmed  Coordinate discharge/paperwork with Ofelia [facility]AS  Send PD/Discharge Summary to WAYNE  Send PD/COS to Lonnie Cannon    Provisional Discharge and Change of Status needed at discharge

## 2025-02-04 NOTE — PROGRESS NOTES
----------------------------------------------------------------------------------------------------------  [facility]  Psychiatry Progress Note  Hospital Day #115     Interim History:     The patient's care was discussed with the treatment team and chart notes were reviewed.    Identifier: Estevan Aaron is a 64 year old male with previous psychiatric diagnoses of  generalized anxiety disorder, Neurocognitive disorder, admitted from the ED 10/12/2024 due to concern for HI and psychosis, which now have resolved, in the psychosocial stressors (recent divorce)     Sleep: 6.25 hours (02/04/25 0600)  Scheduled medications: Took all scheduled medications as prescribed  Psychiatric PRN medications:  none       Staff Report:   No acute events over night. Seen in the milieu, interacting with peers. Remains occasionally confused but redirectable.      Subjective:     Patient Interview:  Santos was interviewed in the milieu. Says that he's feeling good. Says that his family (daughter and son in law) is no longer able to visit him and how he feels sad about that. Santos mentioned that he is feeling good enough today.  Has no other concerns or questions for the team. Informed him that treatment team is hopeful about placement and that he may be discharged some time next week.     ROS:  See above     Objective:     Vitals:  BP (!) 142/82 (BP Location: Right arm, Patient Position: Sitting, Cuff Size: Adult Large)   Pulse 63   Temp 97.8  F (36.6  C) (Oral)   Resp 16   Wt 115.3 kg (254 lb 1.6 oz)   SpO2 94%   BMI 41.01 kg/m      Allergies:  No Known Allergies    Current Medications:  Scheduled:  Current Facility-Administered Medications   Medication Dose Route Frequency Provider Last Rate Last Admin    acetaminophen (TYLENOL) tablet 650 mg  650 mg Oral Q4H PRN Nikki Caceres MD   650 mg at 01/09/25 0228    alum & mag hydroxide-simethicone (MAALOX) suspension 30 mL  30 mL Oral Q4H  PRN Nikki Caceres MD   30 mL at 10/17/24 0837    amLODIPine (NORVASC) tablet 10 mg  10 mg Oral Daily Presley Barrera MD   10 mg at 02/03/25 0751    atorvastatin (LIPITOR) tablet 40 mg  40 mg Oral Daily Nikki Caceres MD   40 mg at 02/03/25 0750    cholecalciferol (VITAMIN D3) capsule 1,250 mcg  1,250 mcg Oral Q7 Days Evelia Grimm DO   1,250 mcg at 01/28/25 1146    cinacalcet (SENSIPAR) tablet 30 mg  30 mg Oral Daily Presley Barrera MD   30 mg at 02/03/25 0750    cloNIDine (CATAPRES) tablet 0.1 mg  0.1 mg Oral BID Presley Barrera MD   0.1 mg at 02/03/25 2015    diclofenac (VOLTAREN) 1 % topical gel 2 g  2 g Topical 4x Daily PRN Rocío Lopez MD   2 g at 12/22/24 2032    furosemide (LASIX) tablet 40 mg  40 mg Oral Daily Presley Barrera MD   40 mg at 02/03/25 0751    gabapentin (NEURONTIN) capsule 100 mg  100 mg Oral Q6H PRN Nikki Caceres MD   100 mg at 12/24/24 0046    hydrocortisone (CORTAID) 0.5 % cream   Topical Daily PRN Savi Chamorro MD        Lidocaine (LIDOCARE) 4 % Patch 1 patch  1 patch Transdermal Q24H PRN Rocío Lopez MD   1 patch at 12/22/24 2023    lisinopril (ZESTRIL) tablet 40 mg  40 mg Oral Daily Presley Barrera MD   40 mg at 02/03/25 0751    melatonin tablet 3 mg  3 mg Oral At Bedtime Presley Barrera MD   3 mg at 02/03/25 2015    metoprolol succinate ER (TOPROL XL) 24 hr tablet 50 mg  50 mg Oral Daily Presley Barrera MD   50 mg at 02/03/25 0751    nicotine (NICODERM CQ) 14 MG/24HR 24 hr patch 1 patch  1 patch Transdermal Daily Evelia Grimm DO   1 patch at 02/03/25 0755    nicotine (NICORETTE) gum 2 mg  2 mg Buccal Q1H PRN González Garcia MD   2 mg at 10/24/24 1254    OLANZapine zydis (zyPREXA) ODT tab 5 mg  5 mg Oral At Bedtime González Garcia MD   5 mg at 02/03/25 2015    Or    OLANZapine (zyPREXA) injection 10 mg  10 mg Intramuscular At Bedtime González Garcia MD   10 mg at 01/12/25 2211    OLANZapine (zyPREXA) tablet 5 mg   5 mg Oral TID PRN Evelia Grimm DO   5 mg at 01/05/25 1645    Or    OLANZapine (zyPREXA) injection 5 mg  5 mg Intramuscular TID PRN Evelia Grimm DO        polyethylene glycol (MIRALAX) Packet 17 g  17 g Oral Daily PRN Nikki Caceres MD        senna-docusate (SENOKOT-S/PERICOLACE) 8.6-50 MG per tablet 1 tablet  1 tablet Oral Daily Evelia Grimm DO   1 tablet at 02/03/25 0750    traZODone (DESYREL) half-tab 25 mg  25 mg Oral At Bedtime Rocío Lopez MD   25 mg at 02/03/25 2015    traZODone (DESYREL) half-tab 25 mg  25 mg Oral At Bedtime PRN Evelia Grimm DO   25 mg at 12/24/24 0046       PRN:  Current Facility-Administered Medications   Medication Dose Route Frequency Provider Last Rate Last Admin    acetaminophen (TYLENOL) tablet 650 mg  650 mg Oral Q4H PRN Nikki Caceres MD   650 mg at 01/09/25 0228    alum & mag hydroxide-simethicone (MAALOX) suspension 30 mL  30 mL Oral Q4H PRN Nikki Caceres MD   30 mL at 10/17/24 0837    amLODIPine (NORVASC) tablet 10 mg  10 mg Oral Daily Presley Barrera MD   10 mg at 02/03/25 0751    atorvastatin (LIPITOR) tablet 40 mg  40 mg Oral Daily Nikki Caceres MD   40 mg at 02/03/25 0750    cholecalciferol (VITAMIN D3) capsule 1,250 mcg  1,250 mcg Oral Q7 Days Evelia Grimm DO   1,250 mcg at 01/28/25 1146    cinacalcet (SENSIPAR) tablet 30 mg  30 mg Oral Daily Presley Barrera MD   30 mg at 02/03/25 0750    cloNIDine (CATAPRES) tablet 0.1 mg  0.1 mg Oral BID Presley Barrera MD   0.1 mg at 02/03/25 2015    diclofenac (VOLTAREN) 1 % topical gel 2 g  2 g Topical 4x Daily PRN Rocío Lopez MD   2 g at 12/22/24 2032    furosemide (LASIX) tablet 40 mg  40 mg Oral Daily Presley Barrera MD   40 mg at 02/03/25 0751    gabapentin (NEURONTIN) capsule 100 mg  100 mg Oral Q6H PRN Nikki Caceres MD   100 mg at 12/24/24 0046    hydrocortisone (CORTAID) 0.5 % cream   Topical Daily PRN Savi Chamorro MD        Lidocaine (LIDOCARE) 4 %  Patch 1 patch  1 patch Transdermal Q24H PRN Rocío Lopez MD   1 patch at 12/22/24 2023    lisinopril (ZESTRIL) tablet 40 mg  40 mg Oral Daily Presley Barrera MD   40 mg at 02/03/25 0751    melatonin tablet 3 mg  3 mg Oral At Bedtime Presley Barrera MD   3 mg at 02/03/25 2015    metoprolol succinate ER (TOPROL XL) 24 hr tablet 50 mg  50 mg Oral Daily Presley Barrera MD   50 mg at 02/03/25 0751    nicotine (NICODERM CQ) 14 MG/24HR 24 hr patch 1 patch  1 patch Transdermal Daily Evelia Grimm DO   1 patch at 02/03/25 0755    nicotine (NICORETTE) gum 2 mg  2 mg Buccal Q1H PRN González Garcia MD   2 mg at 10/24/24 1254    OLANZapine zydis (zyPREXA) ODT tab 5 mg  5 mg Oral At Bedtime González Garcia MD   5 mg at 02/03/25 2015    Or    OLANZapine (zyPREXA) injection 10 mg  10 mg Intramuscular At Bedtime González Garcia MD   10 mg at 01/12/25 2211    OLANZapine (zyPREXA) tablet 5 mg  5 mg Oral TID PRN Evelia Grimm DO   5 mg at 01/05/25 1645    Or    OLANZapine (zyPREXA) injection 5 mg  5 mg Intramuscular TID PRN Evelia Grimm DO        polyethylene glycol (MIRALAX) Packet 17 g  17 g Oral Daily PRN Nikki Caceres MD        senna-docusate (SENOKOT-S/PERICOLACE) 8.6-50 MG per tablet 1 tablet  1 tablet Oral Daily Evelia Grimm DO   1 tablet at 02/03/25 0750    traZODone (DESYREL) half-tab 25 mg  25 mg Oral At Bedtime Rocío Lopez MD   25 mg at 02/03/25 2015    traZODone (DESYREL) half-tab 25 mg  25 mg Oral At Bedtime PRN Evelia Grimm DO   25 mg at 12/24/24 0046     Labs and Imaging:  Data this admission:  - CBC unremarkable  - CMP unremarkable  - TSH normal  - UDS negative  - Vit D low  - Hgb A1c 5.9 (10/13/24)  - Lipids unremarkable  - Vit B12 normal  - Folate normal  - Urinalysis unremarkable  - EKG normal sinus rhythm, QTc 390 ms  - Head CT showed no acute changes  - HIV non reactive  - Treponema antibody non reactive  - ESR wnl   - Ceruloplasmin wnl   - BOOGIE negative  - Lyme  "negative      Parathyroid hormone:   85 (10/13)     Calcium:  11.4 (10/14) -> 10.3 (10/16) -> 9.8 (10/19) -> 10.6 (10/21) -> 10.3 (10/23)     Albumin:  4.3 (10/14) -->  4.3 (10/16) -> 4.1 (10/17) -> 4.2 (10/19) -> 4.5 (10/21) -> 4.3 (10/23)      Mental Status Exam:     Oriented to:  Person/Self  General:  Awake and Alert  Appearance:  appears stated age, Grooming is adequate, Dressed in own clothing, and overweight  Behavior/Attitude:  Calm and cooperative  Eye Contact: Appropriate   Psychomotor: Normal and No evidence of tics, dystonia, or tardive dyskinesia  no catatonia present  Speech:  appropriate volume/tone  Language: Fluent in English with appropriate syntax and vocabulary.  Mood:  \"good\"   Affect:  appropriate and congruent with mood  Thought Process:  disorganized, confused, and incoherent  Thought Content:   No SI/HI/AH/VH; No apparent delusions  Associations:  loose  Insight:  impaired due to neurocognitive disorder   Judgment:  impaired due to neurocognitive disorder  Impulse control: impaired  Attention Span:  inattentive  Concentration:   impaired by neurocognitive disorder  Recent and Remote Memory:   remote memory intact, recent memory impaired  Fund of Knowledge: average  Muscle Strength and Tone: normal  Gait and Station: Normal     Psychiatric Assessment     Estevan Aaron is a 64 year old male with previous psychiatric diagnoses of GEORGINA admitted from the ER on 10/12/2024 due to concern for HI and psychosis in the context of medical issues (hyperthyroidism, hypercalcemia), psychosocial stressors including with recent divorce and selling his home. This is the patient's first psychiatric hospitalization. The MSE on admission was pertinent for a thought process which was perseverative, circumstantial, tangential, disorganized and tangential; with rambling and looseness of associations. Psychological contributions to presentation included lack of insight. Social factors contributing to presentation " included isolation, recent divorce, selling his house, and moving from hotel to hotel. Biological contributions to presentation included a history of hyperparathyroidism with chronic hypercalcemia per charts as well as a history of methamphetamine use per collateral from ex-wife.     Per collateral from ex-wife, Santos has had paranoid ideations since they first met. He has always felt like people are out to get him or trying to rip him off. She says that he has had visual hallucinations (he will point things out that the rest of the family can't see) for a long time, but the family would just go along with him to avoid making him angry. This timeline and presentation could be consistent with diagnosis of paranoid personality disorder. Things began to worsen around the beginning of the COVID pandemic, when Santos became more isolated and the family started to notice cognitive issues such as impaired memory. Other things that could be contributing to his presentation is hyperparathyroidism with hypercalcemia. However, patient's calcium returned to normal limits on 10/16, and patient continues to have disorganized behavior unrelated to calcium elevation, so likely this is not a significant contributing factor to patient's presentation.      Currently, Santos is at times disoriented, but easily re-directable. Presents with some difficulty of word articulation, which contributes to his frustration when interacting with psych team, as he finds it difficult to explain himself. He is able to take care of his ADLs without much assistance and he is mostly independent in the unit. On the other hand, his confused behavior tend to increase in the evenings, seemingly related to  Neurocognitive Disorder diagnosis, and this could evolved to be his new baseline. Either way, Santos does not present as a danger to himself or other so his SIO was discontinued. Santos does not present with any new episodes of physical agitation and is able to attend  groups and interact with peers without major altercations. Patient currently is stable with current neuroleptic dose, patient probably wont benefit from any modification in current therapy.          Psychiatric Plan by Diagnosis     Today's changes:  - No medication changes.     # Major Neurocognitive Disorder  1. Medications:  - Olanzapine 5 mg at bedtime     3. Additional Plans:  - Patient will be treated in therapeutic milieu with appropriate individual and group therapies as described  - Pending memory facility placement.      # Unspecified anxiety vs Generalized Anxiety Disorder  - Monitor for symptoms.  - Fluoxetine held due to suspicion of ongoing manic symptoms     Psychiatric Hospital Course:      Estevan Aaron was admitted to [facility] on a 72 hour hold.   Medications:  PTA fluoxetine was held due to concern for worsening of zelalem   New medications started at the time of admission include Zyprexa.   Increased olanzapine 10 mg at bedtime was to 10 mg BID (10/14)   Increased olanzapine 10 mg BID to 10 mg during day and 15 mg at bedtime (10/15)  10/21: increased olanzapine pm dose from 15 to 20 mg  10/23: started melatonin 3 mg at 7 pm to help patient with circadian rhythm as he has been staying up throughout the night and sleeping a lot during the day and there is some concern for delirium   10/24: consulted anesthesia for MRI brain   10/28: MRI brain complete, decrease AM olanzapine from 10 to 5 mg  10/29: Morning olanzapine decreased to 2.5 mg, evening olanzapine decreased to 15 mg   11/1: Decreased evening olanzapine to 10 mg   11/4: Decreased evening olanzapine to 7.5 mg   11/5: Decreased evening olanzapine to 5 mg   11/11: Moved morning dose of olanzapine to the afternoon as patient appears more agitated in the afternoons/evenings  11/21: Started memantine 5 mg daily   11/26: Discontinued olanzapine 2.5 mg during the afternoon  12/2:   Discontinued memantine 5 mg, daily    The risks, benefits,  alternatives, and side effects were discussed and understood by the patient and other caregivers.     Medical Assessment and Plan     Medical diagnoses to be addressed this admission:    # Hip Pain  - New right hip pain, and mild pain in multiple joints. Moderate response to topical antiinflammatory gel and lidocaine patch.   - Medicine consulted for recommendations 12/20    # CHF  # Hypertension  - Continue PTA medications  Furosemide 40 mg daily  Lisinopril 40 mg daily  Metoprolol 50 mg daily  Amlodipine 10 mg daily  Clonidine 0.1 mg BID      # Hyperlipidemia  - Continue PTA Atorvastatin 40 mg     # Primary Hyperparathyroidism  # Hypercalcemia, hypophosphoremia   Increased Ca level to 10.9 and decreased Ph level to 2.3 in the ED   - Consulted endocrinology, who started cinacalcet 30 mg BID on 10/13  - 10/16 endocrinology recommended continuing to trend calcium and albumin to make sure patient does not become hypocalcemic and recommended holding cinacalcet   - 10/17, calcium and albumin wnl, endo recommended cinacalcet 30 mg once daily   - 10/21, per endo continue cincaclcet and recheck calcium and albumin on October 24  - 10/23: per endo, patient needs to follow up outpatient for further management of hyperparathyroidism     # Rhinorrhea  1/15 currently multiple COVID infections on unit. No other symptoms of COVID noted. COVID PCR - on 1/13.    Medical course: Patient was physically examined by the ED prior to being transferred to the unit and was found to be medically stable and appropriate for admission.      Consults: Psychiatry, Endocrinology (follow-up of hyperthyroidism / hypercalcemia and hypertension), neurology     Checklist     Legal Status: Committed   [facility] meds: Haldol, Clozaril, Risperdal, Invega, Zyprexa, Seroquel, Abilify    Safety Assessment:   Behavioral Orders   Procedures    Code 1 - Restrict to Unit    Routine Programming     As clinically indicated    Status 15     Every 15 minutes.     Status Individual Observation     Patient SIO status reviewed with team/RN.  Please also refer to RN/team documentation for add'l detail.    -SIO staff to monitor following which have contributed to patient being on SIO:  Patient intrusiveness to other patients  -Possible interventions SIO staff could use to support patient's treatment progress:  Redirection  -When following observed, team will review discontinuation of SIO:  Improvement in intrusive behavior.     Order Specific Question:   CONTINUOUS 24 hours / day     Answer:   Other     Order Specific Question:   Specify distance     Answer:   10 feet     Order Specific Question:   Indications for SIO     Answer:   Severe intrusiveness       Risk Assessment:  Risk for harm is low  Risk factors: impulsive and past behaviors  Protective factors: family      SIO: No    Disposition: Pending transfer to memory care facility.       Attestations     This patient was seen and discussed with my attending physician.  Evelia Grimm DO  PGY-1 Psychiatry Resident      Attestation:  This patient has been seen and evaluated by me, Pete Smith MD.  I have discussed this patient with the house staff team including the resident and/or medical student and I agree with the findings and plan in this note.    I have reviewed today's vital signs, medications, labs and imaging. Pete Smith MD , PhD.

## 2025-02-04 NOTE — PLAN OF CARE
Problem: Sleep Disturbance  Goal: Adequate Sleep/Rest  Outcome: Progressing   Goal Outcome Evaluation:  Patient had a quiet night. Asleep in the lounge at the beginning of the shift but eventually went and settled in bed. No complaints of pain. No requests for prn's during the shift. No behavioral issues. Safety checks in place q 15 minutes. Appears to have slept for 6.25 hours.

## 2025-02-04 NOTE — PLAN OF CARE
"Pt denies SI.  Pt has not showered in a week.  Staff have encouraged him to shower, but he is declining.  Pt frequently at staff desk this am. With paper with phone numbers and envelope with papers.  Pt talking about iM (Pt's son in law.)  Pt not making sense in what he is saying.  Encouraging pt to keep papers in his room where it will be safe. Pt states Mi going to take care of something.  So his papers have been sitting at  as shift.  Pt in sits in lounge sometimes awake and sometimes sleeping.  Pt has bee medications compliant.    Problem: Adult Behavioral Health Plan of Care  Goal: Plan of Care Review  Outcome: Not Progressing  Goal: Patient-Specific Goal (Individualization)  Description: You can add care plan individualizations to a care plan. Examples of Individualization might be:  \"Parent requests to be called daily at 9am for status\", \"I have a hard time hearing out of my right ear\", or \"Do not touch me to wake me up as it startles  me\".  Outcome: Not Progressing  Goal: Adheres to Safety Considerations for Self and Others  Outcome: Not Progressing  Intervention: Develop and Maintain Individualized Safety Plan  Recent Flowsheet Documentation  Taken 2/4/2025 1300 by Catherine Szymanski RN  Safety Measures:   safety rounds completed   environmental rounds completed  Intervention: Develop and Maintain Individualized Safety Plan  Recent Flowsheet Documentation  Taken 2/4/2025 1300 by Catherine Szymanski, RN  Safety Measures:   safety rounds completed   environmental rounds completed  Goal: Absence of New-Onset Illness or Injury  Outcome: Not Progressing  Intervention: Identify and Manage Fall Risk  Recent Flowsheet Documentation  Taken 2/4/2025 1300 by Catherine Szymanski RN  Safety Measures:   safety rounds completed   environmental rounds completed  Goal: Optimized Coping Skills in Response to Life Stressors  Outcome: Not Progressing     Problem: Psychotic Signs/Symptoms  Goal: Improved Behavioral Control " (Psychotic Signs/Symptoms)  Outcome: Not Progressing  Goal: Optimal Cognitive Function (Psychotic Signs/Symptoms)  Outcome: Not Progressing  Goal: Increased Participation and Engagement (Psychotic Signs/Symptoms)  Outcome: Not Progressing  Goal: Improved Mood Symptoms (Psychotic Signs/Symptoms)  Outcome: Not Progressing  Goal: Improved Psychomotor Symptoms (Psychotic Signs/Symptoms)  Outcome: Not Progressing  Intervention: Manage Psychomotor Movement  Recent Flowsheet Documentation  Taken 2/4/2025 1300 by Catherine Szymanski RN  Activity (Behavioral Health): up ad diya  Goal: Decreased Sensory Symptoms (Psychotic Signs/Symptoms)  Outcome: Not Progressing  Goal: Improved Sleep (Psychotic Signs/Symptoms)  Outcome: Not Progressing  Goal: Enhanced Social, Occupational or Functional Skills (Psychotic Signs/Symptoms)  Outcome: Not Progressing     Problem: Depression  Goal: Improved Mood  Outcome: Not Progressing     Problem: Suicidal Behavior  Goal: Suicidal Behavior is Absent or Managed  Outcome: Not Progressing     Problem: Sleep Disturbance  Goal: Adequate Sleep/Rest  Outcome: Not Progressing     Problem: Seclusion/Restraint, Behavioral  Goal: Seclusion/Behavioral Restraint Goal: Absence of Harm or Injury  Outcome: Not Progressing  Intervention: Implement Least Restrictive Safety Strategies  Recent Flowsheet Documentation  Taken 2/4/2025 1300 by Catherine Szymanski, RN  Safety Measures:   safety rounds completed   environmental rounds completed     Problem: Pain Acute  Goal: Optimal Pain Control and Function  Outcome: Not Progressing     Problem: Manic or Hypomanic Signs/Symptoms  Goal: Improved Impulse Control (Manic/Hypomanic Signs/Symptoms)  Outcome: Not Progressing  Goal: Optimized Cognitive Function (Manic/Hypomanic Signs/Symptoms)  Outcome: Not Progressing  Goal: Improved Mood Symptoms (Manic/Hypomanic Signs/Symptoms)  Outcome: Not Progressing  Goal: Optimized Nutrition Intake (Manic or Hypomanic Signs/Symptoms)  Outcome:  Not Progressing  Goal: Improved Psychomotor Symptoms (Manic/Hypomanic Signs/Symptoms)  Outcome: Not Progressing  Intervention: Manage Psychomotor Movement  Recent Flowsheet Documentation  Taken 2/4/2025 1300 by Catherine Szymanski RN  Activity (Behavioral Health): up ad diya  Goal: Improved Sleep (Manic/Hypomanic Signs/Symptoms)  Outcome: Not Progressing  Goal: Enhanced Social, Occupational or Functional Skills (Manic/Hypomanic Signs/Symptoms)  Outcome: Not Progressing     Problem: Anxiety  Goal: Anxiety Reduction or Resolution  Outcome: Not Progressing   Goal Outcome Evaluation:

## 2025-02-04 NOTE — PLAN OF CARE
BEH IP Unit Acuity Rating Score (UARS)  Patient is given one point for every criteria they meet.    CRITERIA SCORING   On a 72 hour hold, court hold, committed, stay of commitment, or revocation. 1    Patient LOS on BEH unit exceeds 20 days. 1  LOS: 115   Patient under guardianship, 55+, otherwise medically complex, or under age 11. 1   Suicide ideation without relief of precipitating factors. 0   Current plan for suicide. 0   Current plan for homicide. 0   Imminent risk or actual attempt to seriously harm another without relief of factors precipitating the attempt. 1   Severe dysfunction in daily living (ex: complete neglect for self care, extreme disruption in vegetative function, extreme deterioration in social interactions). 1   Recent (last 7 days) or current physical aggression in the ED or on unit. 0   Restraints or seclusion episode in past 72 hours. 0   Recent (last 7 days) or current verbal aggression, agitation, yelling, etc., while in the ED or unit. 0   Active psychosis. 1   Need for constant or near constant redirection (from leaving, from others, etc).  0   Intrusive or disruptive behaviors. 0   Patient requires 3 or more hours of individualized nursing care per 8-hour shift (i.e. for ADLs, meds, therapeutic interventions). 0   TOTAL 6

## 2025-02-04 NOTE — PLAN OF CARE
The pt was visible in the milieu for the majority of the shift, watching TV and interacting with selected peers. The pt appeared calm in mood, with a flat affect, and denied any concerns. The pt made some illogical statements with intermittent confusion but was able to engage in conversation. No behavioral escalations or safety concerns were noted or reported. The pt's appetite was good and fluid intake was adequate. The pt complied with med and care.    Problem: Psychotic Signs/Symptoms  Goal: Improved Mood Symptoms (Psychotic Signs/Symptoms)  Outcome: Progressing  Intervention: Optimize Emotion and Mood  Recent Flowsheet Documentation  Taken 2/4/2025 1645 by Jarrod Keenan, RN  Supportive Measures: active listening utilized  Diversional Activity: television  Intervention: Optimize Emotion and Mood  Recent Flowsheet Documentation  Taken 2/4/2025 1645 by Jarrod Keenan, RN  Supportive Measures: active listening utilized  Diversional Activity: television   Goal Outcome Evaluation:    Plan of Care Reviewed With: patient

## 2025-02-05 PROCEDURE — 250N000013 HC RX MED GY IP 250 OP 250 PS 637

## 2025-02-05 PROCEDURE — 99232 SBSQ HOSP IP/OBS MODERATE 35: CPT | Mod: GC | Performed by: PSYCHIATRY & NEUROLOGY

## 2025-02-05 PROCEDURE — 124N000002 HC R&B MH UMMC

## 2025-02-05 RX ORDER — OLANZAPINE 10 MG/2ML
5 INJECTION, POWDER, FOR SOLUTION INTRAMUSCULAR AT BEDTIME
Status: DISCONTINUED | OUTPATIENT
Start: 2025-02-05 | End: 2025-02-10

## 2025-02-05 RX ADMIN — FUROSEMIDE 40 MG: 40 TABLET ORAL at 08:25

## 2025-02-05 RX ADMIN — CINACALCET 30 MG: 30 TABLET ORAL at 08:25

## 2025-02-05 RX ADMIN — CLONIDINE HYDROCHLORIDE 0.1 MG: 0.1 TABLET ORAL at 21:19

## 2025-02-05 RX ADMIN — LISINOPRIL 40 MG: 10 TABLET ORAL at 08:24

## 2025-02-05 RX ADMIN — OLANZAPINE 2.5 MG: 5 TABLET, ORALLY DISINTEGRATING ORAL at 21:20

## 2025-02-05 RX ADMIN — Medication 1 PATCH: at 10:00

## 2025-02-05 RX ADMIN — CLONIDINE HYDROCHLORIDE 0.1 MG: 0.1 TABLET ORAL at 08:24

## 2025-02-05 RX ADMIN — Medication 25 MG: at 21:21

## 2025-02-05 RX ADMIN — SENNOSIDES AND DOCUSATE SODIUM 1 TABLET: 50; 8.6 TABLET ORAL at 08:25

## 2025-02-05 RX ADMIN — ATORVASTATIN CALCIUM 40 MG: 20 TABLET, FILM COATED ORAL at 08:25

## 2025-02-05 RX ADMIN — MELATONIN TAB 3 MG 3 MG: 3 TAB at 21:20

## 2025-02-05 RX ADMIN — AMLODIPINE BESYLATE 10 MG: 5 TABLET ORAL at 08:24

## 2025-02-05 RX ADMIN — METOPROLOL SUCCINATE 50 MG: 50 TABLET, EXTENDED RELEASE ORAL at 08:25

## 2025-02-05 ASSESSMENT — ACTIVITIES OF DAILY LIVING (ADL)
ADLS_ACUITY_SCORE: 76
DRESS: INDEPENDENT;STREET CLOTHES
ADLS_ACUITY_SCORE: 76
ADLS_ACUITY_SCORE: 76
ORAL_HYGIENE: INDEPENDENT
ADLS_ACUITY_SCORE: 76
DRESS: INDEPENDENT;STREET CLOTHES
ADLS_ACUITY_SCORE: 76
LAUNDRY: UNABLE TO COMPLETE
HYGIENE/GROOMING: INDEPENDENT;PROMPTS
ADLS_ACUITY_SCORE: 76
LAUNDRY: UNABLE TO COMPLETE
ADLS_ACUITY_SCORE: 76
HYGIENE/GROOMING: INDEPENDENT;PROMPTS
ADLS_ACUITY_SCORE: 76
ORAL_HYGIENE: INDEPENDENT
ADLS_ACUITY_SCORE: 76
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
## 2025-02-05 NOTE — PLAN OF CARE
BEH IP Unit Acuity Rating Score (UARS)  Patient is given one point for every criteria they meet.    CRITERIA SCORING   On a 72 hour hold, court hold, committed, stay of commitment, or revocation. 1    Patient LOS on BEH unit exceeds 20 days. 1  LOS: 116   Patient under guardianship, 55+, otherwise medically complex, or under age 11. 1   Suicide ideation without relief of precipitating factors. 0   Current plan for suicide. 0   Current plan for homicide. 0   Imminent risk or actual attempt to seriously harm another without relief of factors precipitating the attempt. 1   Severe dysfunction in daily living (ex: complete neglect for self care, extreme disruption in vegetative function, extreme deterioration in social interactions). 1   Recent (last 7 days) or current physical aggression in the ED or on unit. 0   Restraints or seclusion episode in past 72 hours. 0   Recent (last 7 days) or current verbal aggression, agitation, yelling, etc., while in the ED or unit. 0   Active psychosis. 1   Need for constant or near constant redirection (from leaving, from others, etc).  0   Intrusive or disruptive behaviors. 0   Patient requires 3 or more hours of individualized nursing care per 8-hour shift (i.e. for ADLs, meds, therapeutic interventions). 0   TOTAL 6

## 2025-02-05 NOTE — PLAN OF CARE
Team Note Due:  Monday    Assessment/Intervention/Current Symtoms and Care Coordination:  Chart review and met with team, discussed pt progress, symptomology, and response to treatment.  Discussed the discharge plan and any potential impediments to discharge. Clifford Aaron is emergency guardian/conservator until 02/27/2025. A petition for permanent guardianship/conservatorship has been filed and a hearing is scheduled for 2/27/25.    Discharge Plan or Goal:  Memory care facility     Barriers to Discharge:  Patient requires further psychiatric stabilization due to current symptomology, medication management with changes subject to provider, coordination with outside supports, and aftercare planning. Pt is under civil commitment.     Referral Status:  Memory Care approved:  [facility]. Tour completed 11/30. Per Clifford on 1/8, she would like to move forward with a referral. Tentative move in date is 2/24/25.  Vera (Exec Director): [phone]      Memory Care facilities currently in process:  [facility]. Tour completed 11/27.  [facility]. Tour scheduled 1/8/25. Have immediate openings and will accept EW. Family not requesting referral yet.  [facility]. Per Clifford on 1/20, she would like to move forward with a referral. Records sent 1/20.  [email or web address]    Memory Care facilities pending family review:  [facility].  [facility].  [facility].  [facility].  [facility]    Memory Care facilities declined:  [facility] (private pay only, no EW).  [facility] (private pay only, no EW).  [facility] (no openings).  [facility]. Tour completed 11/29. Records sent 12/2/24. Declined 12/19/24 (don't feel they are an appropriate facility for pt's needs).  Lorraine (marketing  director): [email or web address]; [phone]  Isatu (director of nursing): [email or web address]  [facility].  Denied 12/26 (concerned about dementia with psychosis, history of hallucinations, high elopement risk, still on 1:1).  Nikki Noel: [email or web address]; Office [phone], Fax [phone]   [facility]. Not accepting EW as of 1/7/25.    Legal Status:  MI Commitment with [facility]: [place]  File Number: [id number]  Start and expiration date of commitment: 10/24/24 - 04/24/25    [facility] meds: Haldol, Clozaril, Risperdal, Invega, Zyprexa, Seroquel, Abilify    PPS/CM:  Shelby Chowdary: [phone]  [email or web address].    Contacts:  Maria C Aaron (Daughter): [phone]   Clifford Aaron (ex-wife): [phone]     No Del Angel (guardianship/conservatorship ): [phone]  [email or web address]    Caro Ross ([facility] probate court visitor for guardianship): [phone]  [email or web address]    Derrell Duff ([employer] ): [phone]  [email or web address]     Upcoming Meetings and Dates/Important Information and next steps:  Schedule ambulance transport when discharge is confirmed  Coordinate discharge/paperwork with [facility]  Send PD/Discharge Summary to   Send PD/COS to [facility]    Provisional Discharge and Change of Status needed at discharge

## 2025-02-05 NOTE — PLAN OF CARE
Problem: Sleep Disturbance  Goal: Adequate Sleep/Rest  Outcome: Progressing   Goal Outcome Evaluation:  Patient had an uneventful night, asleep at the beginning of the shift and for majority of the night. No behavioral issues. No complaints of pain. No requested or administered prn's. Respirations regular and non-labored. Routine safety checks in place q 15 minutes. Appears to have slept for 6 hours.

## 2025-02-05 NOTE — PROGRESS NOTES
"  ----------------------------------------------------------------------------------------------------------  [facility]  Psychiatry Progress Note  Hospital Day #116     Interim History:     The patient's care was discussed with the treatment team and chart notes were reviewed.    Identifier: Estevan Aaron is a 64 year old male with previous psychiatric diagnoses of  generalized anxiety disorder, Neurocognitive disorder, admitted from the ED 10/12/2024 due to concern for HI and psychosis, which now have resolved, in the psychosocial stressors (recent divorce)     Sleep: 6 hours (02/05/25 0600)  Scheduled medications: Took all scheduled medications as prescribed  Psychiatric PRN medications:  none       Staff Report:   No acute events over night. Seen in the milieu, interacting with peers. Remains occasionally confused but redirectable. The pt's appetite was good and fluid intake was adequate. No requested or administered prn's.      Subjective:     Patient Interview:  Santos was interviewed in the milieu. He was irritable regarding staff washing his clothes and when offered face lotion. He did share that his face was feeling sore, declined shower. Said that meds are going fine. Had no other requests or concerns. He was asked if he wanted to chat more in his room, he agreed and started to walk down the marmolejo with this provider then turned and walked the other way, muttering negative comments about staff. Redirected him that the entire team is here to try and help him feel better. He asked this provider whether she is building an addition to \"the house\", when asked which house, he repeated the same statement.     ROS:  See above     Objective:     Vitals:  /74 (BP Location: Left arm, Patient Position: Sitting, Cuff Size: Adult Large)   Pulse 55   Temp 97.9  F (36.6  C) (Temporal)   Resp 16   Wt 114.3 kg (252 lb)   SpO2 94%   BMI 40.67 kg/m      Allergies:  No Known " Allergies    Current Medications:  Scheduled:  Current Facility-Administered Medications   Medication Dose Route Frequency Provider Last Rate Last Admin    acetaminophen (TYLENOL) tablet 650 mg  650 mg Oral Q4H PRN Nikki Caceres MD   650 mg at 01/09/25 0228    alum & mag hydroxide-simethicone (MAALOX) suspension 30 mL  30 mL Oral Q4H PRN Nikki Caceres MD   30 mL at 10/17/24 0837    amLODIPine (NORVASC) tablet 10 mg  10 mg Oral Daily Presley Barrera MD   10 mg at 02/04/25 0822    atorvastatin (LIPITOR) tablet 40 mg  40 mg Oral Daily Nikki Caceres MD   40 mg at 02/04/25 0822    cholecalciferol (VITAMIN D3) capsule 1,250 mcg  1,250 mcg Oral Q7 Days Sirisha EveliaDO   1,250 mcg at 02/04/25 1256    cinacalcet (SENSIPAR) tablet 30 mg  30 mg Oral Daily Presley Barrera MD   30 mg at 02/04/25 0822    cloNIDine (CATAPRES) tablet 0.1 mg  0.1 mg Oral BID Presley Barrera MD   0.1 mg at 02/04/25 2035    diclofenac (VOLTAREN) 1 % topical gel 2 g  2 g Topical 4x Daily PRN Rocío Lopez MD   2 g at 12/22/24 2032    furosemide (LASIX) tablet 40 mg  40 mg Oral Daily Presley Barrera MD   40 mg at 02/04/25 0822    gabapentin (NEURONTIN) capsule 100 mg  100 mg Oral Q6H PRN Nikki Caceres MD   100 mg at 12/24/24 0046    hydrocortisone (CORTAID) 0.5 % cream   Topical Daily PRN Savi Chamorro MD        Lidocaine (LIDOCARE) 4 % Patch 1 patch  1 patch Transdermal Q24H PRN Rocío Lopez MD   1 patch at 12/22/24 2023    lisinopril (ZESTRIL) tablet 40 mg  40 mg Oral Daily Presley Barrera MD   40 mg at 02/04/25 0822    melatonin tablet 3 mg  3 mg Oral At Bedtime Presley Barrera MD   3 mg at 02/04/25 2035    metoprolol succinate ER (TOPROL XL) 24 hr tablet 50 mg  50 mg Oral Daily Presley Barrera MD   50 mg at 02/04/25 0822    nicotine (NICODERM CQ) 14 MG/24HR 24 hr patch 1 patch  1 patch Transdermal Daily Evelia Grimm DO   1 patch at 02/04/25 0826    nicotine (NICORETTE) gum 2  mg  2 mg Buccal Q1H PRN González Garcia MD   2 mg at 10/24/24 1254    OLANZapine zydis (zyPREXA) ODT tab 5 mg  5 mg Oral At Bedtime González Garcia MD   5 mg at 02/04/25 2035    Or    OLANZapine (zyPREXA) injection 10 mg  10 mg Intramuscular At Bedtime González Garcia MD   10 mg at 01/12/25 2211    OLANZapine (zyPREXA) tablet 5 mg  5 mg Oral TID PRN Evelia Grimm DO   5 mg at 01/05/25 1645    Or    OLANZapine (zyPREXA) injection 5 mg  5 mg Intramuscular TID PRN Evelia Grimm DO        polyethylene glycol (MIRALAX) Packet 17 g  17 g Oral Daily PRN Nikki Caceres MD        senna-docusate (SENOKOT-S/PERICOLACE) 8.6-50 MG per tablet 1 tablet  1 tablet Oral Daily Evelia Grimm DO   1 tablet at 02/04/25 0822    traZODone (DESYREL) half-tab 25 mg  25 mg Oral At Bedtime Rocío Lopez MD   25 mg at 02/04/25 2035    traZODone (DESYREL) half-tab 25 mg  25 mg Oral At Bedtime PRN Evelia Grimm DO   25 mg at 12/24/24 0046       PRN:  Current Facility-Administered Medications   Medication Dose Route Frequency Provider Last Rate Last Admin    acetaminophen (TYLENOL) tablet 650 mg  650 mg Oral Q4H PRN Nikki Caceres MD   650 mg at 01/09/25 0228    alum & mag hydroxide-simethicone (MAALOX) suspension 30 mL  30 mL Oral Q4H PRN Nikki Caceres MD   30 mL at 10/17/24 0837    amLODIPine (NORVASC) tablet 10 mg  10 mg Oral Daily Presley Barrera MD   10 mg at 02/04/25 0822    atorvastatin (LIPITOR) tablet 40 mg  40 mg Oral Daily Nikki Caceres MD   40 mg at 02/04/25 0822    cholecalciferol (VITAMIN D3) capsule 1,250 mcg  1,250 mcg Oral Q7 Days Evelia Grimm DO   1,250 mcg at 02/04/25 1256    cinacalcet (SENSIPAR) tablet 30 mg  30 mg Oral Daily Presley Barrera MD   30 mg at 02/04/25 0822    cloNIDine (CATAPRES) tablet 0.1 mg  0.1 mg Oral BID Presley Barrera MD   0.1 mg at 02/04/25 2035    diclofenac (VOLTAREN) 1 % topical gel 2 g  2 g Topical 4x Daily PRN Rocío Lopez MD   2 g at  12/22/24 2032    furosemide (LASIX) tablet 40 mg  40 mg Oral Daily Presley Barrera MD   40 mg at 02/04/25 0822    gabapentin (NEURONTIN) capsule 100 mg  100 mg Oral Q6H PRN Nikki Caceres MD   100 mg at 12/24/24 0046    hydrocortisone (CORTAID) 0.5 % cream   Topical Daily PRN Savi Chamorro MD        Lidocaine (LIDOCARE) 4 % Patch 1 patch  1 patch Transdermal Q24H PRN Rocío Lopez MD   1 patch at 12/22/24 2023    lisinopril (ZESTRIL) tablet 40 mg  40 mg Oral Daily Presley Barrera MD   40 mg at 02/04/25 0822    melatonin tablet 3 mg  3 mg Oral At Bedtime Presley Barrera MD   3 mg at 02/04/25 2035    metoprolol succinate ER (TOPROL XL) 24 hr tablet 50 mg  50 mg Oral Daily Presley Barrera MD   50 mg at 02/04/25 0822    nicotine (NICODERM CQ) 14 MG/24HR 24 hr patch 1 patch  1 patch Transdermal Daily Evelia Grimm DO   1 patch at 02/04/25 0826    nicotine (NICORETTE) gum 2 mg  2 mg Buccal Q1H PRN González Garcia MD   2 mg at 10/24/24 1254    OLANZapine zydis (zyPREXA) ODT tab 5 mg  5 mg Oral At Bedtime González Garcia MD   5 mg at 02/04/25 2035    Or    OLANZapine (zyPREXA) injection 10 mg  10 mg Intramuscular At Bedtime González Garcia MD   10 mg at 01/12/25 2211    OLANZapine (zyPREXA) tablet 5 mg  5 mg Oral TID PRN Evelia Grimm DO   5 mg at 01/05/25 1645    Or    OLANZapine (zyPREXA) injection 5 mg  5 mg Intramuscular TID PRN Evelia Grimm DO        polyethylene glycol (MIRALAX) Packet 17 g  17 g Oral Daily PRN Nikki Caceres MD        senna-docusate (SENOKOT-S/PERICOLACE) 8.6-50 MG per tablet 1 tablet  1 tablet Oral Daily Evelia Grimm DO   1 tablet at 02/04/25 0822    traZODone (DESYREL) half-tab 25 mg  25 mg Oral At Bedtime Rocío Lopez MD   25 mg at 02/04/25 2035    traZODone (DESYREL) half-tab 25 mg  25 mg Oral At Bedtime BRYANNAN Evelia Grimm DO   25 mg at 12/24/24 0046     Labs and Imaging:  Data this admission:  - CBC unremarkable  - CMP  "unremarkable  - TSH normal  - UDS negative  - Vit D low  - Hgb A1c 5.9 (10/13/24)  - Lipids unremarkable  - Vit B12 normal  - Folate normal  - Urinalysis unremarkable  - EKG normal sinus rhythm, QTc 390 ms  - Head CT showed no acute changes  - HIV non reactive  - Treponema antibody non reactive  - ESR wnl   - Ceruloplasmin wnl   - BOOGIE negative  - Lyme negative      Parathyroid hormone:   85 (10/13)     Calcium:  11.4 (10/14) -> 10.3 (10/16) -> 9.8 (10/19) -> 10.6 (10/21) -> 10.3 (10/23)     Albumin:  4.3 (10/14) -->  4.3 (10/16) -> 4.1 (10/17) -> 4.2 (10/19) -> 4.5 (10/21) -> 4.3 (10/23)      Mental Status Exam:     Oriented to:  Person/Self  General:  Awake and Alert  Appearance:  appears stated age, Dressed in own clothing, overweight, and Grooming is inadequate due to refusing to bathe for several days  Behavior/Attitude:  irritable, pacing the marmolejo, low distress tolerance  Eye Contact: Appropriate   Psychomotor: Normal and No evidence of tics, dystonia, or tardive dyskinesia  no catatonia present  Speech:  loud volume/tone  Language: Fluent in English with appropriate syntax and vocabulary.  Mood:  \"my mood!\" [Shook his head]  Affect:  dysphoric   Thought Process:  disorganized, confused, and incoherent  Thought Content:   No SI/HI/AH/VH;  delusion that he is building a house and staff are workers in various roles  Associations:  loose  Insight:  impaired due to neurocognitive disorder   Judgment:  impaired due to neurocognitive disorder  Impulse control: impaired  Attention Span:  inattentive  Concentration:   impaired by neurocognitive disorder  Recent and Remote Memory:   remote memory intact, recent memory impaired  Fund of Knowledge: average  Muscle Strength and Tone: normal  Gait and Station: Normal, slowed     Psychiatric Assessment     Estevan Aaron is a 64 year old male with previous psychiatric diagnoses of GEORGINA admitted from the ER on 10/12/2024 due to concern for HI and psychosis in the context of " medical issues (hyperthyroidism, hypercalcemia), psychosocial stressors including with recent divorce and selling his home. This is the patient's first psychiatric hospitalization. The MSE on admission was pertinent for a thought process which was perseverative, circumstantial, tangential, disorganized and tangential; with rambling and looseness of associations. Psychological contributions to presentation included lack of insight. Social factors contributing to presentation included isolation, recent divorce, selling his house, and moving from hotel to hotel. Biological contributions to presentation included a history of hyperparathyroidism with chronic hypercalcemia per charts as well as a history of methamphetamine use per collateral from ex-wife.     Per collateral from ex-wife, Santos has had paranoid ideations since they first met. He has always felt like people are out to get him or trying to rip him off. She says that he has had visual hallucinations (he will point things out that the rest of the family can't see) for a long time, but the family would just go along with him to avoid making him angry. This timeline and presentation could be consistent with diagnosis of paranoid personality disorder. Things began to worsen around the beginning of the COVID pandemic, when Santos became more isolated and the family started to notice cognitive issues such as impaired memory. Other things that could be contributing to his presentation is hyperparathyroidism with hypercalcemia. However, patient's calcium returned to normal limits on 10/16, and patient continues to have disorganized behavior unrelated to calcium elevation, so likely this is not a significant contributing factor to patient's presentation.      Currently, Santos is at times disoriented, but easily re-directable. Presents with some difficulty of word articulation, which contributes to his frustration when interacting with psych team, as he finds it difficult to  explain himself. He occasionally has struggled to attend to ADLS particularly bathing and required frequent encouragement, likely due to underlying disorganized behavior. Overall, he is mostly independent in the unit. His confused behavior tends to increase in the evenings, seemingly related to  Neurocognitive Disorder diagnosis, and this could evolved to be his new baseline.ob does not present as a danger to himself or other so his SIO was discontinued. Santos does not present with any new episodes of physical agitation and is able to attend groups and interact with peers without major altercations. Patient currently is stable with current neuroleptic doses in regards to improved paranoia. However, neuroleptics could be worsening his brain fog and may benefit from a down titration as a trial while awaiting placement. Due to inability to care for self outside a supportive sxs, would benefit from continued hospital stay with discharge to group home/nursing home when available.          Psychiatric Plan by Diagnosis     Today's changes:  - Decreased Zyprexa to 2.5mg QHS    # Major Neurocognitive Disorder  1. Medications:  - Decreased Zyprexa to 2.5mg QHS     3. Additional Plans:  - Patient will be treated in therapeutic milieu with appropriate individual and group therapies as described  - Pending memory facility placement.      # Unspecified anxiety vs Generalized Anxiety Disorder  - Monitor for symptoms.  - Fluoxetine held due to suspicion of ongoing manic symptoms     Psychiatric Hospital Course:      Estevan Aaron was admitted to [facility] on a 72 hour hold.   Medications:  PTA fluoxetine was held due to concern for worsening of zelalem   New medications started at the time of admission include Zyprexa.   Increased olanzapine 10 mg at bedtime was to 10 mg BID (10/14)   Increased olanzapine 10 mg BID to 10 mg during day and 15 mg at bedtime (10/15)  10/21: increased olanzapine pm dose from 15 to 20 mg  10/23: started  melatonin 3 mg at 7 pm to help patient with circadian rhythm as he has been staying up throughout the night and sleeping a lot during the day and there is some concern for delirium   10/24: consulted anesthesia for MRI brain   10/28: MRI brain complete, decrease AM olanzapine from 10 to 5 mg  10/29: Morning olanzapine decreased to 2.5 mg, evening olanzapine decreased to 15 mg   11/1: Decreased evening olanzapine to 10 mg   11/4: Decreased evening olanzapine to 7.5 mg   11/5: Decreased evening olanzapine to 5 mg   11/11: Moved morning dose of olanzapine to the afternoon as patient appears more agitated in the afternoons/evenings  11/21: Started memantine 5 mg daily   11/26: Discontinued olanzapine 2.5 mg during the afternoon  12/2:   Discontinued memantine 5 mg, daily  2/5: as psychosis has improved with less paranoia, it was thought patient could benefit from a decrease in Zyprexa as it could be worsening is cognitive sxs and given black box warning for his age category. Decreased Zyprexa to 2.5mg at bedtime as a trial. Can restart if psychosis worsens.     The risks, benefits, alternatives, and side effects were discussed and understood by the patient and other caregivers.     Medical Assessment and Plan     Medical diagnoses to be addressed this admission:    # Hip Pain  - New right hip pain, and mild pain in multiple joints. Moderate response to topical antiinflammatory gel and lidocaine patch.   - Medicine consulted for recommendations 12/20    # CHF  # Hypertension  - Continue PTA medications  Furosemide 40 mg daily  Lisinopril 40 mg daily  Metoprolol 50 mg daily  Amlodipine 10 mg daily  Clonidine 0.1 mg BID      # Hyperlipidemia  - Continue PTA Atorvastatin 40 mg     # Primary Hyperparathyroidism  # Hypercalcemia, hypophosphoremia   Increased Ca level to 10.9 and decreased Ph level to 2.3 in the ED   - Consulted endocrinology, who started cinacalcet 30 mg BID on 10/13  - 10/16 endocrinology recommended  continuing to trend calcium and albumin to make sure patient does not become hypocalcemic and recommended holding cinacalcet   - 10/17, calcium and albumin wnl, endo recommended cinacalcet 30 mg once daily   - 10/21, per endo continue cincaclcet and recheck calcium and albumin on October 24  - 10/23: per endo, patient needs to follow up outpatient for further management of hyperparathyroidism     # Rhinorrhea  1/15 currently multiple COVID infections on unit. No other symptoms of COVID noted. COVID PCR - on 1/13.    #Chipped tooth: Pt chipped a molar at dinner on 2/4, denied pain. No bleeding.  - would benefit from outpt dental follow up.     Medical course: Patient was physically examined by the ED prior to being transferred to the unit and was found to be medically stable and appropriate for admission.      Consults: Psychiatry, Endocrinology (follow-up of hyperthyroidism / hypercalcemia and hypertension), neurology     Checklist     Legal Status: Committed   [place] meds: Haldol, Clozaril, Risperdal, Invega, Zyprexa, Seroquel, Abilify    Safety Assessment:   Behavioral Orders   Procedures    Code 1 - Restrict to Unit    Routine Programming     As clinically indicated    Status 15     Every 15 minutes.    Status Individual Observation     Patient SIO status reviewed with team/RN.  Please also refer to RN/team documentation for add'l detail.    -SIO staff to monitor following which have contributed to patient being on SIO:  Patient intrusiveness to other patients  -Possible interventions SIO staff could use to support patient's treatment progress:  Redirection  -When following observed, team will review discontinuation of SIO:  Improvement in intrusive behavior.     Order Specific Question:   CONTINUOUS 24 hours / day     Answer:   Other     Order Specific Question:   Specify distance     Answer:   10 feet     Order Specific Question:   Indications for SIO     Answer:   Severe intrusiveness       Risk  Assessment:  Risk for harm is low  Risk factors: impulsive and past behaviors  Protective factors: family      SIO: No    Disposition: Pending transfer to memory care facility.  Accepted to [facility] for 2/24.      Attestations     This patient was seen and discussed with my attending physician.  Nikki Caceres MD  Psychiatry Resident Physician  [id number]     Attestation:  This patient has been seen and evaluated by me, Pete Smith MD.  I have discussed this patient with the house staff team including the resident and/or medical student and I agree with the findings and plan in this note.    I have reviewed today's vital signs, medications, labs and imaging. Pete Smith MD , PhD.

## 2025-02-06 PROCEDURE — 250N000013 HC RX MED GY IP 250 OP 250 PS 637

## 2025-02-06 PROCEDURE — 124N000002 HC R&B MH UMMC

## 2025-02-06 PROCEDURE — 99231 SBSQ HOSP IP/OBS SF/LOW 25: CPT | Mod: GC | Performed by: PSYCHIATRY & NEUROLOGY

## 2025-02-06 RX ADMIN — OLANZAPINE 2.5 MG: 5 TABLET, ORALLY DISINTEGRATING ORAL at 20:07

## 2025-02-06 RX ADMIN — Medication 1 PATCH: at 08:33

## 2025-02-06 RX ADMIN — MELATONIN TAB 3 MG 3 MG: 3 TAB at 20:07

## 2025-02-06 RX ADMIN — SENNOSIDES AND DOCUSATE SODIUM 1 TABLET: 50; 8.6 TABLET ORAL at 08:32

## 2025-02-06 RX ADMIN — CINACALCET 30 MG: 30 TABLET ORAL at 08:32

## 2025-02-06 RX ADMIN — FUROSEMIDE 40 MG: 40 TABLET ORAL at 08:31

## 2025-02-06 RX ADMIN — CLONIDINE HYDROCHLORIDE 0.1 MG: 0.1 TABLET ORAL at 08:32

## 2025-02-06 RX ADMIN — Medication 25 MG: at 20:06

## 2025-02-06 RX ADMIN — CLONIDINE HYDROCHLORIDE 0.1 MG: 0.1 TABLET ORAL at 20:08

## 2025-02-06 RX ADMIN — LISINOPRIL 40 MG: 10 TABLET ORAL at 08:32

## 2025-02-06 RX ADMIN — AMLODIPINE BESYLATE 10 MG: 5 TABLET ORAL at 08:33

## 2025-02-06 RX ADMIN — ATORVASTATIN CALCIUM 40 MG: 20 TABLET, FILM COATED ORAL at 08:33

## 2025-02-06 ASSESSMENT — ACTIVITIES OF DAILY LIVING (ADL)
ADLS_ACUITY_SCORE: 66
ADLS_ACUITY_SCORE: 66
ADLS_ACUITY_SCORE: 76
ADLS_ACUITY_SCORE: 76
ADLS_ACUITY_SCORE: 66
ADLS_ACUITY_SCORE: 76
HYGIENE/GROOMING: INDEPENDENT
ADLS_ACUITY_SCORE: 76
ADLS_ACUITY_SCORE: 66
ADLS_ACUITY_SCORE: 76
ADLS_ACUITY_SCORE: 66
ADLS_ACUITY_SCORE: 76
ADLS_ACUITY_SCORE: 76
LAUNDRY: WITH SUPERVISION
ADLS_ACUITY_SCORE: 76
ORAL_HYGIENE: INDEPENDENT
ADLS_ACUITY_SCORE: 66
ADLS_ACUITY_SCORE: 76
ADLS_ACUITY_SCORE: 76
ADLS_ACUITY_SCORE: 66

## 2025-02-06 NOTE — PROGRESS NOTES
----------------------------------------------------------------------------------------------------------  [facility]  Psychiatry Progress Note  Hospital Day #117     Interim History:     The patient's care was discussed with the treatment team and chart notes were reviewed.    Identifier: Estevan Aaron is a 64 year old male with previous psychiatric diagnoses of  generalized anxiety disorder, Neurocognitive disorder, admitted from the ED 10/12/2024 due to concern for HI and psychosis, which now have resolved, in the psychosocial stressors (recent divorce).     Sleep: 6.5 hours (02/06/25 0600)  Scheduled medications: Took all scheduled medications as prescribed  Psychiatric PRN medications:  none       Staff Report:   No acute events over night. Was irritable with staff, not showering despite multiple staff direction, endorsed delusion that his truck was stolen, asked staff to go get it. Not following that he is in the hospital and he denied events that brought him to the hospital. He did take his medications.      Subjective:     Patient Interview:  Santos was interviewed in his room. He shared that he had just laid down for a nap since he did not sleep much last night. Is feeling tired he said. He shared that it was noisy last night so he could not sleep. Denied trouble with bowel movements and said he has been eating well. Said he wanted to get back to sleep. Did not have any other questions for the team.     ROS:  See above     Objective:     Vitals:  BP (!) 153/75 (BP Location: Left arm)   Pulse 58   Temp 97.5  F (36.4  C) (Temporal)   Resp 18   Wt 113.9 kg (251 lb)   SpO2 95%   BMI 40.51 kg/m      Allergies:  No Known Allergies    Current Medications:  Scheduled:  Current Facility-Administered Medications   Medication Dose Route Frequency Provider Last Rate Last Admin    acetaminophen (TYLENOL) tablet 650 mg  650 mg Oral Q4H PRN Nikki Caceres MD   225  mg at 01/09/25 0228    alum & mag hydroxide-simethicone (MAALOX) suspension 30 mL  30 mL Oral Q4H PRN Nikki Caceres MD   30 mL at 10/17/24 0837    amLODIPine (NORVASC) tablet 10 mg  10 mg Oral Daily Presley Barrera MD   10 mg at 02/05/25 0824    atorvastatin (LIPITOR) tablet 40 mg  40 mg Oral Daily Nikki Caceres MD   40 mg at 02/05/25 0825    cholecalciferol (VITAMIN D3) capsule 1,250 mcg  1,250 mcg Oral Q7 Days Evelia Grimm DO   1,250 mcg at 02/04/25 1256    cinacalcet (SENSIPAR) tablet 30 mg  30 mg Oral Daily Presley Barrera MD   30 mg at 02/05/25 0825    cloNIDine (CATAPRES) tablet 0.1 mg  0.1 mg Oral BID Presley Barrera MD   0.1 mg at 02/05/25 2119    diclofenac (VOLTAREN) 1 % topical gel 2 g  2 g Topical 4x Daily PRN Rocío Lopez MD   2 g at 12/22/24 2032    furosemide (LASIX) tablet 40 mg  40 mg Oral Daily Presley Barrera MD   40 mg at 02/05/25 0825    gabapentin (NEURONTIN) capsule 100 mg  100 mg Oral Q6H PRN Nikki Caceres MD   100 mg at 12/24/24 0046    hydrocortisone (CORTAID) 0.5 % cream   Topical Daily PRN Savi Chamorro MD        Lidocaine (LIDOCARE) 4 % Patch 1 patch  1 patch Transdermal Q24H PRN Rocío Lopez MD   1 patch at 12/22/24 2023    lisinopril (ZESTRIL) tablet 40 mg  40 mg Oral Daily Presley Barrera MD   40 mg at 02/05/25 0824    melatonin tablet 3 mg  3 mg Oral At Bedtime Presley Barrera MD   3 mg at 02/05/25 2120    metoprolol succinate ER (TOPROL XL) 24 hr tablet 50 mg  50 mg Oral Daily Presley Barrera MD   50 mg at 02/05/25 0825    nicotine (NICODERM CQ) 14 MG/24HR 24 hr patch 1 patch  1 patch Transdermal Daily Evelia Grimm DO   1 patch at 02/05/25 1000    nicotine (NICORETTE) gum 2 mg  2 mg Buccal Q1H PRN González Garcia MD   2 mg at 10/24/24 1254    OLANZapine zydis (zyPREXA) ODT half-tab 2.5 mg  2.5 mg Oral At Bedtime Nikki Caceres MD   2.5 mg at 02/05/25 2120    Or    OLANZapine (zyPREXA) injection 5 mg  5 mg  Intramuscular At Bedtime Nikki Caceres MD        OLANZapine (zyPREXA) tablet 5 mg  5 mg Oral TID PRN Evelia Grimm DO   5 mg at 01/05/25 1645    Or    OLANZapine (zyPREXA) injection 5 mg  5 mg Intramuscular TID PRN Evelia Grimm DO        polyethylene glycol (MIRALAX) Packet 17 g  17 g Oral Daily PRN Nikki Caceres MD        senna-docusate (SENOKOT-S/PERICOLACE) 8.6-50 MG per tablet 1 tablet  1 tablet Oral Daily Evelia Grmim DO   1 tablet at 02/05/25 0825    traZODone (DESYREL) half-tab 25 mg  25 mg Oral At Bedtime Rocío Lopez MD   25 mg at 02/05/25 2121    traZODone (DESYREL) half-tab 25 mg  25 mg Oral At Bedtime PRN Evelia Grimm DO   25 mg at 12/24/24 0046       PRN:  Current Facility-Administered Medications   Medication Dose Route Frequency Provider Last Rate Last Admin    acetaminophen (TYLENOL) tablet 650 mg  650 mg Oral Q4H PRN Nikki Caceres MD   650 mg at 01/09/25 0228    alum & mag hydroxide-simethicone (MAALOX) suspension 30 mL  30 mL Oral Q4H PRN Nikki Caceres MD   30 mL at 10/17/24 0837    amLODIPine (NORVASC) tablet 10 mg  10 mg Oral Daily Presley Barrera MD   10 mg at 02/05/25 0824    atorvastatin (LIPITOR) tablet 40 mg  40 mg Oral Daily Nikki Caceres MD   40 mg at 02/05/25 0825    cholecalciferol (VITAMIN D3) capsule 1,250 mcg  1,250 mcg Oral Q7 Days Evelia Grimm DO   1,250 mcg at 02/04/25 1256    cinacalcet (SENSIPAR) tablet 30 mg  30 mg Oral Daily Presley Barrera MD   30 mg at 02/05/25 0825    cloNIDine (CATAPRES) tablet 0.1 mg  0.1 mg Oral BID Presley Barrera MD   0.1 mg at 02/05/25 2119    diclofenac (VOLTAREN) 1 % topical gel 2 g  2 g Topical 4x Daily PRN Rocío Lopezpe, MD   2 g at 12/22/24 2032    furosemide (LASIX) tablet 40 mg  40 mg Oral Daily Presley Barrera MD   40 mg at 02/05/25 0825    gabapentin (NEURONTIN) capsule 100 mg  100 mg Oral Q6H PRN Nikki Caceres MD   100 mg at 12/24/24 0046    hydrocortisone (CORTAID) 0.5  % cream   Topical Daily PRN Savi Chamorro MD        Lidocaine (LIDOCARE) 4 % Patch 1 patch  1 patch Transdermal Q24H PRN Rocío Lopez MD   1 patch at 12/22/24 2023    lisinopril (ZESTRIL) tablet 40 mg  40 mg Oral Daily Presley Barrera MD   40 mg at 02/05/25 0824    melatonin tablet 3 mg  3 mg Oral At Bedtime Presley Barrera MD   3 mg at 02/05/25 2120    metoprolol succinate ER (TOPROL XL) 24 hr tablet 50 mg  50 mg Oral Daily Presley Barrera MD   50 mg at 02/05/25 0825    nicotine (NICODERM CQ) 14 MG/24HR 24 hr patch 1 patch  1 patch Transdermal Daily Evelia Grimm DO   1 patch at 02/05/25 1000    nicotine (NICORETTE) gum 2 mg  2 mg Buccal Q1H PRN González Garcia MD   2 mg at 10/24/24 1254    OLANZapine zydis (zyPREXA) ODT half-tab 2.5 mg  2.5 mg Oral At Bedtime Nikki Caceres MD   2.5 mg at 02/05/25 2120    Or    OLANZapine (zyPREXA) injection 5 mg  5 mg Intramuscular At Bedtime Nikki Caceres MD        OLANZapine (zyPREXA) tablet 5 mg  5 mg Oral TID PRN Evelia Grimm DO   5 mg at 01/05/25 1645    Or    OLANZapine (zyPREXA) injection 5 mg  5 mg Intramuscular TID PRN Evelia Grimm DO        polyethylene glycol (MIRALAX) Packet 17 g  17 g Oral Daily PRN Nikki Caceres MD        senna-docusate (SENOKOT-S/PERICOLACE) 8.6-50 MG per tablet 1 tablet  1 tablet Oral Daily Evelia Grimm DO   1 tablet at 02/05/25 0825    traZODone (DESYREL) half-tab 25 mg  25 mg Oral At Bedtime Rocío Lopez MD   25 mg at 02/05/25 2121    traZODone (DESYREL) half-tab 25 mg  25 mg Oral At Bedtime PRN Evelia Grimm DO   25 mg at 12/24/24 0046     Labs and Imaging:  Data this admission:  - CBC unremarkable  - CMP unremarkable  - TSH normal  - UDS negative  - Vit D low  - Hgb A1c 5.9 (10/13/24)  - Lipids unremarkable  - Vit B12 normal  - Folate normal  - Urinalysis unremarkable  - EKG normal sinus rhythm, QTc 390 ms  - Head CT showed no acute changes  - HIV non reactive  - Treponema  "antibody non reactive  - ESR wnl   - Ceruloplasmin wnl   - BOOGIE negative  - Lyme negative      Parathyroid hormone:   85 (10/13)     Calcium:  11.4 (10/14) -> 10.3 (10/16) -> 9.8 (10/19) -> 10.6 (10/21) -> 10.3 (10/23)     Albumin:  4.3 (10/14) -->  4.3 (10/16) -> 4.1 (10/17) -> 4.2 (10/19) -> 4.5 (10/21) -> 4.3 (10/23)      Mental Status Exam:     Oriented to:  Person/Self  General:  Alert, Drowsy, and lying in bed  Appearance:  appears stated age, Dressed in own clothing, overweight, and Grooming is inadequate due to refusing to bathe for several days  Behavior/Attitude:  reserved, tired  Eye Contact: eyes closed for the duration of the interview  Psychomotor: Normal and No evidence of tics, dystonia, or tardive dyskinesia  no catatonia present  Speech:  loud volume/tone  Language: Fluent in English with appropriate syntax and vocabulary.  Mood:  \"tired\"  Affect:   sleepy, restricted  Thought Process:   concrete  Thought Content:   No SI/HI/AH/VH;  delusion that he is building a house and staff are workers in various roles  Associations:  loose  Insight:  impaired due to neurocognitive disorder   Judgment:  impaired due to neurocognitive disorder  Impulse control: impaired  Attention Span:  inattentive, limited  Concentration:   impaired by neurocognitive disorder  Recent and Remote Memory:   remote memory intact, recent memory impaired  Fund of Knowledge: average  Muscle Strength and Tone: normal  Gait and Station: not assessed today      Psychiatric Assessment     Estevan Aaron is a 64 year old male with previous psychiatric diagnoses of GEORGINA admitted from the ER on 10/12/2024 due to concern for HI and psychosis in the context of medical issues (hyperthyroidism, hypercalcemia), psychosocial stressors including with recent divorce and selling his home. This is the patient's first psychiatric hospitalization. The MSE on admission was pertinent for a thought process which was perseverative, circumstantial, " tangential, disorganized and tangential; with rambling and looseness of associations. Psychological contributions to presentation included lack of insight. Social factors contributing to presentation included isolation, recent divorce, selling his house, and moving from hotel to hotel. Biological contributions to presentation included a history of hyperparathyroidism with chronic hypercalcemia per charts as well as a history of methamphetamine use per collateral from ex-wife.     Per collateral from ex-wife, Santos has had paranoid ideations since they first met. He has always felt like people are out to get him or trying to rip him off. She says that he has had visual hallucinations (he will point things out that the rest of the family can't see) for a long time, but the family would just go along with him to avoid making him angry. This timeline and presentation could be consistent with diagnosis of paranoid personality disorder. Things began to worsen around the beginning of the COVID pandemic, when Santos became more isolated and the family started to notice cognitive issues such as impaired memory. Other things that could be contributing to his presentation is hyperparathyroidism with hypercalcemia. However, patient's calcium returned to normal limits on 10/16, and patient continues to have disorganized behavior unrelated to calcium elevation, so likely this is not a significant contributing factor to patient's presentation.      Currently, Santos is at times disoriented, but easily re-directable. Presents with some difficulty of word articulation, which contributes to his frustration when interacting with psych team, as he finds it difficult to explain himself. He occasionally has struggled to attend to ADLS particularly bathing and required frequent encouragement, likely due to underlying disorganized behavior. Overall, he is mostly independent in the unit. His confused behavior tends to increase in the evenings,  seemingly related to a Neurocognitive Disorder diagnosis, and this could evolve to be his new baseline. He does not present as a danger to himself or others so his SIO was discontinued. Santos does not present with any new episodes of physical agitation and is able to attend groups and interact with peers without major altercations. Patient currently is stable with current neuroleptic doses in regards to improved paranoia. However, neuroleptics could be worsening his brain fog and may benefit from a down titration as a trial while awaiting placement. Due to inability to care for self outside a supportive sxs, would benefit from continued hospital stay with discharge to group home/nursing home when available.          Psychiatric Plan by Diagnosis     Today's changes:  - no changes     # Major Neurocognitive Disorder  1. Medications:  - Zyprexa to 2.5mg QHS     3. Additional Plans:  - Patient will be treated in therapeutic milieu with appropriate individual and group therapies as described  - Pending memory facility placement.      # Unspecified anxiety vs Generalized Anxiety Disorder  - Monitor for symptoms.  - Fluoxetine held due to suspicion of ongoing manic symptoms     Psychiatric Hospital Course:      Estevan Aaron was admitted to [facility] on a 72 hour hold.   Medications:  PTA fluoxetine was held due to concern for worsening of zelalem   New medications started at the time of admission include Zyprexa.   Increased olanzapine 10 mg at bedtime was to 10 mg BID (10/14)   Increased olanzapine 10 mg BID to 10 mg during day and 15 mg at bedtime (10/15)  10/21: increased olanzapine pm dose from 15 to 20 mg  10/23: started melatonin 3 mg at 7 pm to help patient with circadian rhythm as he has been staying up throughout the night and sleeping a lot during the day and there is some concern for delirium   10/24: consulted anesthesia for MRI brain   10/28: MRI brain complete, decrease AM olanzapine from 10 to 5 mg  10/29:  Morning olanzapine decreased to 2.5 mg, evening olanzapine decreased to 15 mg   11/1: Decreased evening olanzapine to 10 mg   11/4: Decreased evening olanzapine to 7.5 mg   11/5: Decreased evening olanzapine to 5 mg   11/11: Moved morning dose of olanzapine to the afternoon as patient appears more agitated in the afternoons/evenings  11/21: Started memantine 5 mg daily   11/26: Discontinued olanzapine 2.5 mg during the afternoon  12/2:   Discontinued memantine 5 mg, daily  2/5: as psychosis has improved with less paranoia, it was thought patient could benefit from a decrease in Zyprexa as it could be worsening is cognitive sxs and given black box warning for his age category. Decreased Zyprexa to 2.5mg at bedtime as a trial. Can restart if psychosis worsens. Seems to be tolerating this well.     The risks, benefits, alternatives, and side effects were discussed and understood by the patient and other caregivers.     Medical Assessment and Plan     Medical diagnoses to be addressed this admission:    # Hip Pain  - New right hip pain, and mild pain in multiple joints. Moderate response to topical antiinflammatory gel and lidocaine patch.   - Medicine consulted for recommendations 12/20    # CHF  # Hypertension  - Continue PTA medications  Furosemide 40 mg daily  Lisinopril 40 mg daily  Metoprolol 50 mg daily  Amlodipine 10 mg daily  Clonidine 0.1 mg BID      # Hyperlipidemia  - Continue PTA Atorvastatin 40 mg     # Primary Hyperparathyroidism  # Hypercalcemia, hypophosphoremia   Increased Ca level to 10.9 and decreased Ph level to 2.3 in the ED   - Consulted endocrinology, who started cinacalcet 30 mg BID on 10/13  - 10/16 endocrinology recommended continuing to trend calcium and albumin to make sure patient does not become hypocalcemic and recommended holding cinacalcet   - 10/17, calcium and albumin wnl, endo recommended cinacalcet 30 mg once daily   - 10/21, per endo continue cincaclcet and recheck calcium and  albumin on October 24  - 10/23: per endo, patient needs to follow up outpatient for further management of hyperparathyroidism     # Rhinorrhea  1/15 currently multiple COVID infections on unit. No other symptoms of COVID noted. COVID PCR - on 1/13.    #Chipped tooth: Pt chipped a molar at dinner on 2/4, denied pain. No bleeding.  - would benefit from outpt dental follow up.     Medical course: Patient was physically examined by the ED prior to being transferred to the unit and was found to be medically stable and appropriate for admission.      Consults: Psychiatry, Endocrinology (follow-up of hyperthyroidism / hypercalcemia and hypertension), neurology     Checklist     Legal Status: Committed   [facility] meds: Haldol, Clozaril, Risperdal, Invega, Zyprexa, Seroquel, Abilify    Safety Assessment:   Behavioral Orders   Procedures    Code 1 - Restrict to Unit    Routine Programming     As clinically indicated    Status 15     Every 15 minutes.    Status Individual Observation     Patient SIO status reviewed with team/RN.  Please also refer to RN/team documentation for add'l detail.    -SIO staff to monitor following which have contributed to patient being on SIO:  Patient intrusiveness to other patients  -Possible interventions SIO staff could use to support patient's treatment progress:  Redirection  -When following observed, team will review discontinuation of SIO:  Improvement in intrusive behavior.     Order Specific Question:   CONTINUOUS 24 hours / day     Answer:   Other     Order Specific Question:   Specify distance     Answer:   10 feet     Order Specific Question:   Indications for SIO     Answer:   Severe intrusiveness       Risk Assessment:  Risk for harm is low  Risk factors: impulsive and past behaviors  Protective factors: family      SIO: No    Disposition: Pending transfer to [facility] care facility.  Accepted to [facility] for 2/24.      Attestations     This patient was seen and discussed with my  attending physician.  Nikki Caceres MD  Psychiatry Resident Physician  PGY2       Attestation:  This patient has been seen and evaluated by me, Pete Smith MD.  I have discussed this patient with the house staff team including the resident and/or medical student and I agree with the findings and plan in this note.    I have reviewed today's vital signs, medications, labs and imaging. Pete Smith MD , PhD.

## 2025-02-06 NOTE — PLAN OF CARE
Problem: Psychotic Signs/Symptoms  Goal: Improved Behavioral Control (Psychotic Signs/Symptoms)  Outcome: Progressing     Problem: Anxiety  Goal: Anxiety Reduction or Resolution  Outcome: Progressing     Problem: Manic or Hypomanic Signs/Symptoms  Goal: Improved Impulse Control (Manic/Hypomanic Signs/Symptoms)  Outcome: Progressing  Intervention: Promote Behavior and Impulse Control  Recent Flowsheet Documentation  Taken 2/6/2025 1633 by Tamie Fraser RN  Diversional Activity: television   Goal Outcome Evaluation:    Pt was visible in the lounge watching TV with select peers dosing on and off. Presented with a blunted and flat affect. Mood is anxious, irritable with moments of agitation. Pt was easily redirectable. Dismissive of all mental health and psych symptoms upon assessments. Po intake adequate. Hygiene inadequate.Disoriented to situation. Pt was compliant with medications. No safety concerns. No behavioral escalation.

## 2025-02-06 NOTE — PLAN OF CARE
BEH IP Unit Acuity Rating Score (UARS)  Patient is given one point for every criteria they meet.    CRITERIA SCORING   On a 72 hour hold, court hold, committed, stay of commitment, or revocation. 1    Patient LOS on BEH unit exceeds 20 days. 1  LOS: 117   Patient under guardianship, 55+, otherwise medically complex, or under age 11. 1   Suicide ideation without relief of precipitating factors. 0   Current plan for suicide. 0   Current plan for homicide. 0   Imminent risk or actual attempt to seriously harm another without relief of factors precipitating the attempt. 1   Severe dysfunction in daily living (ex: complete neglect for self care, extreme disruption in vegetative function, extreme deterioration in social interactions). 1   Recent (last 7 days) or current physical aggression in the ED or on unit. 0   Restraints or seclusion episode in past 72 hours. 0   Recent (last 7 days) or current verbal aggression, agitation, yelling, etc., while in the ED or unit. 0   Active psychosis. 1   Need for constant or near constant redirection (from leaving, from others, etc).  0   Intrusive or disruptive behaviors. 0   Patient requires 3 or more hours of individualized nursing care per 8-hour shift (i.e. for ADLs, meds, therapeutic interventions). 0   TOTAL 6

## 2025-02-06 NOTE — PLAN OF CARE
Team Note Due:  Monday    Assessment/Intervention/Current Symtoms and Care Coordination:  Chart review and met with team, discussed pt progress, symptomology, and response to treatment.  Discussed the discharge plan and any potential impediments to discharge. Clifford Aaron is emergency guardian/conservator until 02/27/2025. A petition for permanent guardianship/conservatorship has been filed and a hearing is scheduled for 2/27/25.    Discharge Plan or Goal:  Memory care facility     Barriers to Discharge:  Patient requires further psychiatric stabilization due to current symptomology, medication management with changes subject to provider, coordination with outside supports, and aftercare planning. Pt is under civil commitment.     Referral Status:  Memory Care approved:  [facility]. Tour completed 11/30. Per Clifford on 1/8, she would like to move forward with a referral. Tentative move in date is 2/24/25.  Vera (Exec Director): [phone]      Memory Care facilities currently in process:  [facility]. Tour completed 11/27.  [facility]. Tour scheduled 1/8/25. Have immediate openings and will accept EW. Family not requesting referral yet.  [facility]. Per Clifford on 1/20, she would like to move forward with a referral. Records sent 1/20.  [email or web address]    Memory Care facilities pending family review:  [facility].  [facility].  [facility].  [facility].  [facility].    Memory Care facilities declined:  [facility] (private pay only, no EW).  [facility] (private pay only, no EW).  [facility] (no openings).  [facility]. Tour completed 11/29. Records sent 12/2/24. Declined 12/19/24 (don't feel they are an appropriate facility for pt's needs).  Lorraine (marketing  director): [email or web address]; [phone]  Isatu (director of nursing): [email or web address]  [facility].  Denied 12/26 (concerned about dementia with psychosis, history of hallucinations, high elopement risk, still on 1:1).  Nikki Noel: [email or web address]; Office [phone], Fax [phone]   [facility]. Not accepting EW as of 1/7/25.    Legal Status:  MI Commitment with [facility] [place]  File Number: [id number]  Start and expiration date of commitment: 10/24/24 - 04/24/25    [facility] meds: Haldol, Clozaril, Risperdal, Invega, Zyprexa, Seroquel, Abilify    PPS/CM:  Shelby Chowdary: [phone]  [email or web address].    Contacts:  Maria C Aaron (Daughter): [phone]   Clifford Aaron (ex-wife): [phone]     No Del Angel (guardianship/conservatorship ): [phone]  [email or web address]    Caro Ross ([facility] probate court visitor for guardianship): [phone]  [email or web address]    Derrell Duff ([employer] ): [phone]  [email or web address]     Upcoming Meetings and Dates/Important Information and next steps:  Schedule ambulance transport when discharge is confirmed  Coordinate discharge/paperwork with [facility]  Send PD/Discharge Summary to   Send PD/COS to [facility]    Provisional Discharge and Change of Status needed at discharge

## 2025-02-06 NOTE — PLAN OF CARE
Goal Outcome Evaluation:    Problem: Psychotic Signs/Symptoms  Goal: Improved Behavioral Control (Psychotic Signs/Symptoms)  Outcome: Progressing     Pt presents calm, cooperative, and pleasant for majority of shift, although is labile at times. Pt is labile at times and will briefly become irritable. Pt appeared to have intermittent confusions with illogical speech. Pt disoriented to situation and time, and was forgetful throughout shift. When Pt would be speaking, at times Pt would be rambling and there were long pauses within the same sentence/thought. Pt thought process appears disorganized. Pt is mostly pleasant when talking and is social with both staff and peers, however briefly became agitated with staff but was de-escalated with therapeutic communication from staff.   Pt split time between room and milieu.     Pt denies concerns with bowel/bladder. Pt denies pain. Pt denies need for PRN medications.  VSS (BP elevated however it does not meet order parameters for notifying provider), medication compliant, behaviorally in control throughout shift.

## 2025-02-06 NOTE — PLAN OF CARE
Problem: Psychotic Signs/Symptoms  Goal: Improved Behavioral Control (Psychotic Signs/Symptoms)  Outcome: Progressing  Intervention: Manage Behavior  Recent Flowsheet Documentation  Taken 2/5/2025 1749 by Tamie Fraser RN  De-Escalation Techniques: medication administered     Problem: Anxiety  Goal: Anxiety Reduction or Resolution  Outcome: Progressing     Problem: Manic or Hypomanic Signs/Symptoms  Goal: Improved Impulse Control (Manic/Hypomanic Signs/Symptoms)  Outcome: Progressing  Intervention: Promote Behavior and Impulse Control  Recent Flowsheet Documentation  Taken 2/5/2025 1749 by Tamie Fraser RN  De-Escalation Techniques: medication administered  Diversional Activity: television   Goal Outcome Evaluation:  Pt was visible in the lounge watching TV with select peers. Mood is angry and agitated.Pt denies pain or discomfort.Pt was irritable upon assessments and dismissive of all  mental health and psych symptoms. Nutrition and hydration adequate. Pt is unkempt  Refused shower.Nutrition and hydration adequate. Pt was compliant with medication. No safety concerns. No behavioral concerns.

## 2025-02-06 NOTE — PLAN OF CARE
Problem: Sleep Disturbance  Goal: Adequate Sleep/Rest  Outcome: Progressing   Goal Outcome Evaluation:     Pt was sleeping in the lounge at the start of this shift. He went to his room for a while and was back in the lounge. Pt slept for 6.5 hours. No pain or discomfort reported. Pt did not received any PRN. No new change in condition,     Blood pressure 126/78, pulse 63, temperature 98.7  F (37.1  C), temperature source Tympanic, resp. rate 16, weight 114.3 kg (252 lb), SpO2 94%.

## 2025-02-07 PROCEDURE — 99231 SBSQ HOSP IP/OBS SF/LOW 25: CPT | Performed by: PSYCHIATRY & NEUROLOGY

## 2025-02-07 PROCEDURE — 250N000013 HC RX MED GY IP 250 OP 250 PS 637

## 2025-02-07 PROCEDURE — 124N000002 HC R&B MH UMMC

## 2025-02-07 RX ADMIN — Medication 25 MG: at 20:53

## 2025-02-07 RX ADMIN — LISINOPRIL 40 MG: 10 TABLET ORAL at 08:42

## 2025-02-07 RX ADMIN — SENNOSIDES AND DOCUSATE SODIUM 1 TABLET: 50; 8.6 TABLET ORAL at 08:42

## 2025-02-07 RX ADMIN — ATORVASTATIN CALCIUM 40 MG: 20 TABLET, FILM COATED ORAL at 08:42

## 2025-02-07 RX ADMIN — MELATONIN TAB 3 MG 3 MG: 3 TAB at 20:52

## 2025-02-07 RX ADMIN — AMLODIPINE BESYLATE 10 MG: 5 TABLET ORAL at 08:42

## 2025-02-07 RX ADMIN — CLONIDINE HYDROCHLORIDE 0.1 MG: 0.1 TABLET ORAL at 20:52

## 2025-02-07 RX ADMIN — OLANZAPINE 2.5 MG: 5 TABLET, ORALLY DISINTEGRATING ORAL at 20:55

## 2025-02-07 RX ADMIN — FUROSEMIDE 40 MG: 40 TABLET ORAL at 08:42

## 2025-02-07 RX ADMIN — CLONIDINE HYDROCHLORIDE 0.1 MG: 0.1 TABLET ORAL at 08:42

## 2025-02-07 RX ADMIN — METOPROLOL SUCCINATE 50 MG: 50 TABLET, EXTENDED RELEASE ORAL at 08:42

## 2025-02-07 RX ADMIN — CINACALCET 30 MG: 30 TABLET ORAL at 08:42

## 2025-02-07 RX ADMIN — Medication 1 PATCH: at 08:48

## 2025-02-07 ASSESSMENT — ACTIVITIES OF DAILY LIVING (ADL)
HYGIENE/GROOMING: INDEPENDENT
ADLS_ACUITY_SCORE: 66
ORAL_HYGIENE: INDEPENDENT
ADLS_ACUITY_SCORE: 66
DRESS: INDEPENDENT
ADLS_ACUITY_SCORE: 66
HYGIENE/GROOMING: INDEPENDENT
ORAL_HYGIENE: INDEPENDENT
DRESS: INDEPENDENT

## 2025-02-07 NOTE — PLAN OF CARE
The pt spent majority of the shift in milieu, watching TV and interacting with selected peer. The pt exhibited a calm mood and had a bright affect. The pt engaged in conversations appropriately, although occasionally made illogical statements and showed intermittent confusion. There were not signs of irritability. The pt denied any MH concerns, denied pain and contracted for safety. No behavioral escalations or safety concerns were noted or reported. The pt's appetite was good and maintained adequate fluid intake. The pt complied with med and care.      Problem: Adult Behavioral Health Plan of Care  Goal: Plan of Care Review  Outcome: Progressing  Flowsheets (Taken 2/7/2025 1630)  Plan of Care Reviewed With: patient  Patient Agreement with Plan of Care: agrees     Problem: Psychotic Signs/Symptoms  Goal: Improved Mood Symptoms (Psychotic Signs/Symptoms)  Outcome: Progressing  Intervention: Optimize Emotion and Mood  Recent Flowsheet Documentation  Taken 2/7/2025 1630 by Jarrod Keenan RN  Supportive Measures:   active listening utilized   self-reflection promoted  Diversional Activity: television  Intervention: Optimize Emotion and Mood  Recent Flowsheet Documentation  Taken 2/7/2025 1630 by Jarrod Keenan RN  Supportive Measures:   active listening utilized   self-reflection promoted  Diversional Activity: television   Goal Outcome Evaluation:    Plan of Care Reviewed With: patient Plan of Care Reviewed With: patient

## 2025-02-07 NOTE — PLAN OF CARE
Problem: Sleep Disturbance  Goal: Adequate Sleep/Rest  Outcome: Progressing   Goal Outcome Evaluation:     Pt intermittently visible in the lounge. Slept for about 5.5 hours. No physical s/s  of pain. No PRN given or requested. No significant change in condition.    Blood pressure (!) 147/74, pulse 69, temperature 97.1  F (36.2  C), temperature source Oral, resp. rate 16, weight 113.9 kg (251 lb), SpO2 96%.

## 2025-02-07 NOTE — PLAN OF CARE
Team Note Due:  Monday    Assessment/Intervention/Current Symtoms and Care Coordination:  Chart review and met with team, discussed pt progress, symptomology, and response to treatment.  Discussed the discharge plan and any potential impediments to discharge. Clifford Aaron is emergency guardian/conservator until 02/27/2025. A petition for permanent guardianship/conservatorship has been filed and a hearing is scheduled for 2/27/25.    Received paperwork from [employer] for MD to complete. Given to MD.    Discharge Plan or Goal:  Memory care facility     Barriers to Discharge:  Patient requires further psychiatric stabilization due to current symptomology, medication management with changes subject to provider, coordination with outside supports, and aftercare planning. Pt is under civil commitment.     Referral Status:  Memory Care approved:  [facility]. Tour completed 11/30. Per Clifford on 1/8, she would like to move forward with a referral. Tentative move in date is 2/24/25.  Vera (Exec Director): [phone]      Memory Care facilities currently in process:  [facility]. Tour completed 11/27.  [facility]. Tour scheduled 1/8/25. Have immediate openings and will accept EW. Family not requesting referral yet.  [facility]. Per Clifford on 1/20, she would like to move forward with a referral. Records sent 1/20.  [email or web address]    Memory Care facilities pending family review:  [facility].  [facility].  [facility].  [facility].  [facility].    Memory Care facilities declined:  [facility] (private pay only, no EW).  [facility] [address] (private pay only, no EW).  [facility] (no openings).  [facility]. Tour completed 11/29. Records sent 12/2/24. Declined 12/19/24 (don't feel  they are an appropriate facility for pt's needs).  Lorraine (): [email or web address]; [phone]  Isatu (director of nursing): [email or web address]  [facility] [place].  Denied 12/26 (concerned about dementia with psychosis, history of hallucinations, high elopement risk, still on 1:1).  Nikki Noel: [email or web address]; Office [phone], Fax [phone]   [facility]. Not accepting EW as of 1/7/25.    Legal Status:  MI Commitment with [facility]: [place]  File Number: [id number]  Start and expiration date of commitment: 10/24/24 - 04/24/25    [facility] meds: Haldol, Clozaril, Risperdal, Invega, Zyprexa, Seroquel, Abilify    PPS/CM:  Shelby Chowdary: [phone]  [email or web address].    Contacts:  Maria C Aaron (Daughter): [phone]   Clifford Aaron (ex-wife): [phone]     No Del Angel (guardianship/conservatorship ): [phone]  [email or web address]    Caro Ross ([facility] probate court visitor for guardianship): [phone]  [email or web address]    Derrell Duff ([employer] ): [phone]  [email or web address]     Upcoming Meetings and Dates/Important Information and next steps:  Schedule ambulance transport when discharge is confirmed  Coordinate discharge/paperwork with [employer]  Send PD/Discharge Summary to CM  Send PD/COS to [facility]    Provisional Discharge and Change of Status needed at discharge

## 2025-02-07 NOTE — PROGRESS NOTES
----------------------------------------------------------------------------------------------------------  [facility]  Psychiatry Progress Note  Hospital Day #118     Interim History:     The patient's care was discussed with the treatment team and chart notes were reviewed.    Identifier: Estevan Aaron is a 64 year old male with previous psychiatric diagnoses of  generalized anxiety disorder, Neurocognitive disorder, admitted from the ED 10/12/2024 due to concern for HI and psychosis, which now have resolved, in the psychosocial stressors (recent divorce).     Sleep: 5.5 hours (02/07/25 0647)  Scheduled medications: Took all scheduled medications as prescribed  Psychiatric PRN medications:  none       Staff Report:   No acute events over night. Intermittently visible in the lounge. No significant change in condition. Visible in lounge watching TV with select peers, dosing on and off. Blunted and flat affect. Mood early in day was calm, cooperative and pleasant; but anxious, irritable with moments of agitation in evening. Easily redirectable. Appears disorganized and disoriented to situation and time. Dismissive of all mental health and psych symptoms. Denies pain and bowel/bladder concerns. Adequate PO intake. Hygiene inadequate. Compliant with medications. No safety concerns nor behavioral escalation.     Subjective:     Patient Interview:  Santos was interviewed in the [facility]. Feeling fine. Notes left knee pain. Looking forward to getting out of here. Asked for help in getting another patient a pair of glasses. BMs going good. No other concerns or questions for team.    ROS:  Patient denies acute concerns apart from knee pain noted above.     Objective:     Vitals:  /77 (BP Location: Right arm, Patient Position: Sitting)   Pulse 62   Temp 98.2  F (36.8  C) (Temporal)   Resp 16   Wt 113.9 kg (251 lb)   SpO2 93%   BMI 40.51 kg/m      Allergies:  No Known  Allergies    Current Medications:  Scheduled:  Current Facility-Administered Medications   Medication Dose Route Frequency Provider Last Rate Last Admin    acetaminophen (TYLENOL) tablet 650 mg  650 mg Oral Q4H PRN Nikki Caceres MD   650 mg at 01/09/25 0228    alum & mag hydroxide-simethicone (MAALOX) suspension 30 mL  30 mL Oral Q4H PRN Nikki Caceres MD   30 mL at 10/17/24 0837    amLODIPine (NORVASC) tablet 10 mg  10 mg Oral Daily Presley Barrera MD   10 mg at 02/06/25 0833    atorvastatin (LIPITOR) tablet 40 mg  40 mg Oral Daily Nikki Caceres MD   40 mg at 02/06/25 0833    cholecalciferol (VITAMIN D3) capsule 1,250 mcg  1,250 mcg Oral Q7 Days SirishaEveliaDO   1,250 mcg at 02/04/25 1256    cinacalcet (SENSIPAR) tablet 30 mg  30 mg Oral Daily Presley Barrera MD   30 mg at 02/06/25 0832    cloNIDine (CATAPRES) tablet 0.1 mg  0.1 mg Oral BID Presley Barrera MD   0.1 mg at 02/06/25 2008    diclofenac (VOLTAREN) 1 % topical gel 2 g  2 g Topical 4x Daily PRN Rocío Lopez MD   2 g at 12/22/24 2032    furosemide (LASIX) tablet 40 mg  40 mg Oral Daily Presley Barrera MD   40 mg at 02/06/25 0831    gabapentin (NEURONTIN) capsule 100 mg  100 mg Oral Q6H PRN Nikki Caceres MD   100 mg at 12/24/24 0046    hydrocortisone (CORTAID) 0.5 % cream   Topical Daily PRN Savi Chamorro MD        Lidocaine (LIDOCARE) 4 % Patch 1 patch  1 patch Transdermal Q24H PRN Rocío Lopez MD   1 patch at 12/22/24 2023    lisinopril (ZESTRIL) tablet 40 mg  40 mg Oral Daily Presley Barrera MD   40 mg at 02/06/25 0832    melatonin tablet 3 mg  3 mg Oral At Bedtime Presley Barrera MD   3 mg at 02/06/25 2007    metoprolol succinate ER (TOPROL XL) 24 hr tablet 50 mg  50 mg Oral Daily Presley Barrera MD   50 mg at 02/05/25 0825    nicotine (NICODERM CQ) 14 MG/24HR 24 hr patch 1 patch  1 patch Transdermal Daily Evelia Grimm DO   1 patch at 02/06/25 0833    nicotine (NICORETTE) gum 2  mg  2 mg Buccal Q1H PRN González Garcia MD   2 mg at 10/24/24 1254    OLANZapine zydis (zyPREXA) ODT half-tab 2.5 mg  2.5 mg Oral At Bedtime Nikki Caceres MD   2.5 mg at 02/06/25 2007    Or    OLANZapine (zyPREXA) injection 5 mg  5 mg Intramuscular At Bedtime Nikki Caceres MD        OLANZapine (zyPREXA) tablet 5 mg  5 mg Oral TID PRN Evelia Grimm DO   5 mg at 01/05/25 1645    Or    OLANZapine (zyPREXA) injection 5 mg  5 mg Intramuscular TID PRN Evelia Grimm DO        polyethylene glycol (MIRALAX) Packet 17 g  17 g Oral Daily PRN Nikki Caceres MD        senna-docusate (SENOKOT-S/PERICOLACE) 8.6-50 MG per tablet 1 tablet  1 tablet Oral Daily Evelia Grimm DO   1 tablet at 02/06/25 0832    traZODone (DESYREL) half-tab 25 mg  25 mg Oral At Bedtime Rocío Lopez MD   25 mg at 02/06/25 2006    traZODone (DESYREL) half-tab 25 mg  25 mg Oral At Bedtime PRN Evelia Grimm DO   25 mg at 12/24/24 0046       PRN:  Current Facility-Administered Medications   Medication Dose Route Frequency Provider Last Rate Last Admin    acetaminophen (TYLENOL) tablet 650 mg  650 mg Oral Q4H PRN Nikki Caceres MD   650 mg at 01/09/25 0228    alum & mag hydroxide-simethicone (MAALOX) suspension 30 mL  30 mL Oral Q4H PRN Nikki Caceres MD   30 mL at 10/17/24 0837    amLODIPine (NORVASC) tablet 10 mg  10 mg Oral Daily Presley Barrera MD   10 mg at 02/06/25 0833    atorvastatin (LIPITOR) tablet 40 mg  40 mg Oral Daily Nikki Caceres MD   40 mg at 02/06/25 0833    cholecalciferol (VITAMIN D3) capsule 1,250 mcg  1,250 mcg Oral Q7 Days Evelia Grimm DO   1,250 mcg at 02/04/25 1256    cinacalcet (SENSIPAR) tablet 30 mg  30 mg Oral Daily Presley Barrera MD   30 mg at 02/06/25 0832    cloNIDine (CATAPRES) tablet 0.1 mg  0.1 mg Oral BID Presley Barrera MD   0.1 mg at 02/06/25 2008    diclofenac (VOLTAREN) 1 % topical gel 2 g  2 g Topical 4x Daily PRN Rocío Lopez MD   2 g at 12/22/24  2032    furosemide (LASIX) tablet 40 mg  40 mg Oral Daily Presley Barrera MD   40 mg at 02/06/25 0831    gabapentin (NEURONTIN) capsule 100 mg  100 mg Oral Q6H PRN Nikki Caceres MD   100 mg at 12/24/24 0046    hydrocortisone (CORTAID) 0.5 % cream   Topical Daily PRN Savi Chamorro MD        Lidocaine (LIDOCARE) 4 % Patch 1 patch  1 patch Transdermal Q24H PRN Rocío Lopez MD   1 patch at 12/22/24 2023    lisinopril (ZESTRIL) tablet 40 mg  40 mg Oral Daily Presley Barrera MD   40 mg at 02/06/25 0832    melatonin tablet 3 mg  3 mg Oral At Bedtime Presley Barrera MD   3 mg at 02/06/25 2007    metoprolol succinate ER (TOPROL XL) 24 hr tablet 50 mg  50 mg Oral Daily Presley Barrera MD   50 mg at 02/05/25 0825    nicotine (NICODERM CQ) 14 MG/24HR 24 hr patch 1 patch  1 patch Transdermal Daily Evelia Grimm DO   1 patch at 02/06/25 0833    nicotine (NICORETTE) gum 2 mg  2 mg Buccal Q1H PRN González Garcia MD   2 mg at 10/24/24 1254    OLANZapine zydis (zyPREXA) ODT half-tab 2.5 mg  2.5 mg Oral At Bedtime Nikki Caceres MD   2.5 mg at 02/06/25 2007    Or    OLANZapine (zyPREXA) injection 5 mg  5 mg Intramuscular At Bedtime Nikki Caceres MD        OLANZapine (zyPREXA) tablet 5 mg  5 mg Oral TID PRN Evelia Grimm DO   5 mg at 01/05/25 1645    Or    OLANZapine (zyPREXA) injection 5 mg  5 mg Intramuscular TID PRN Evelia Grimm DO        polyethylene glycol (MIRALAX) Packet 17 g  17 g Oral Daily PRN Nikki Caceres MD        senna-docusate (SENOKOT-S/PERICOLACE) 8.6-50 MG per tablet 1 tablet  1 tablet Oral Daily Evelia Grimm DO   1 tablet at 02/06/25 0832    traZODone (DESYREL) half-tab 25 mg  25 mg Oral At Bedtime Rocío Lopez MD   25 mg at 02/06/25 2006    traZODone (DESYREL) half-tab 25 mg  25 mg Oral At Bedtime PRN Evelia Grimm DO   25 mg at 12/24/24 0046     Labs and Imaging:  Data this admission:  - CBC unremarkable  - CMP unremarkable  - TSH normal  - UDS  "negative  - Vit D low  - Hgb A1c 5.9 (10/13/24)  - Lipids unremarkable  - Vit B12 normal  - Folate normal  - Urinalysis unremarkable  - EKG normal sinus rhythm, QTc 390 ms  - Head CT showed no acute changes  - HIV non reactive  - Treponema antibody non reactive  - ESR wnl   - Ceruloplasmin wnl   - BOOGIE negative  - Lyme negative      Parathyroid hormone:   85 (10/13)     Calcium:  11.4 (10/14) -> 10.3 (10/16) -> 9.8 (10/19) -> 10.6 (10/21) -> 10.3 (10/23)     Albumin:  4.3 (10/14) -->  4.3 (10/16) -> 4.1 (10/17) -> 4.2 (10/19) -> 4.5 (10/21) -> 4.3 (10/23)      Mental Status Exam:     Oriented to:  Person/Self  General:  Alert, eating in the lounge  Appearance:  Appears stated age, dressed in own clothing, overweight, grooming adequate with reported shower today  Behavior/Attitude: Cooperative, irritable   Eye Contact: Appropriate  Psychomotor: Normal and No evidence of tics, dystonia, or tardive dyskinesia  no catatonia present  Speech:  loud volume/tone  Language: Fluent in English with appropriate syntax and vocabulary.  Mood:  \"Fine\"  Affect:   appropriate and congruent with mood  Thought Process:   concrete, disorganized, confused  Thought Content:   No SI/HI/AH/VH;  No apparent delusions today  Associations:  loose  Insight:  impaired due to neurocognitive disorder   Judgment:  impaired due to neurocognitive disorder  Impulse control: impaired  Attention Span:  inattentive, limited  Concentration:   impaired by neurocognitive disorder  Recent and Remote Memory:   remote memory intact, recent memory impaired  Fund of Knowledge: average  Muscle Strength and Tone: normal  Gait and Station: Normal      Psychiatric Assessment     Estevan Aaron is a 64 year old male with previous psychiatric diagnoses of GEORGINA admitted from the ER on 10/12/2024 due to concern for HI and psychosis in the context of medical issues (hyperparathyroidism, hypercalcemia), psychosocial stressors including with recent divorce and selling his " home. This is the patient's first psychiatric hospitalization. The MSE on admission was pertinent for a thought process which was perseverative, circumstantial, tangential, disorganized and tangential; with rambling and looseness of associations. Psychological contributions to presentation included lack of insight. Social factors contributing to presentation included isolation, recent divorce, selling his house, and moving from hotel to hotel. Biological contributions to presentation included a history of hyperparathyroidism with chronic hypercalcemia per charts as well as a history of methamphetamine use per collateral from ex-wife.     Per collateral from ex-wife, Santos has had paranoid ideations since they first met. He has always felt like people are out to get him or trying to rip him off. She says that he has had visual hallucinations (he will point things out that the rest of the family can't see) for a long time, but the family would just go along with him to avoid making him angry. This timeline and presentation could be consistent with diagnosis of paranoid personality disorder. Things began to worsen around the beginning of the COVID pandemic, when Santos became more isolated and the family started to notice cognitive issues such as impaired memory. Other things that could be contributing to his presentation is hyperparathyroidism with hypercalcemia. However, patient's calcium returned to normal limits on 10/16, and patient continues to have disorganized behavior unrelated to calcium elevation, so likely this is not a significant contributing factor to patient's presentation.      Currently, Santos is at times disoriented, but easily re-directable. Presents with some difficulty of word articulation, which contributes to his frustration when interacting with psych team, as he finds it difficult to explain himself. He occasionally has struggled to attend to ADLS particularly bathing and required frequent  encouragement, likely due to underlying disorganized behavior. Overall, he is mostly independent in the unit. His confused behavior tends to increase in the evenings, seemingly related to a Neurocognitive Disorder diagnosis, and this could evolve to be his new baseline. He does not present as a danger to himself or others so his SIO was discontinued. Santos does not present with any new episodes of physical agitation and is able to attend groups and interact with peers without major altercations. Patient currently is stable with current neuroleptic doses in regards to improved paranoia. However, neuroleptics could be worsening his brain fog and may benefit from a down titration as a trial while awaiting placement. He is doing well with a reduction of Zyprexa and the plan is to discontinue it week of 2/10 before expected discharge on 2/24. Due to inability to care for self outside a supportive sxs, would benefit from continued hospital stay with discharge to group home/nursing home when available.          Psychiatric Plan by Diagnosis     Today's changes:  - no changes     # Major Neurocognitive Disorder  1. Medications:  - Zyprexa to 2.5mg QHS     3. Additional Plans:  - Patient will be treated in therapeutic milieu with appropriate individual and group therapies as described  - Pending memory facility placement.      # Unspecified anxiety vs Generalized Anxiety Disorder  - Monitor for symptoms.  - Fluoxetine held due to suspicion of ongoing manic symptoms     Psychiatric Hospital Course:      Estevan Aaron was admitted to [facility] on a 72 hour hold.   Medications:  PTA fluoxetine was held due to concern for worsening of zelalem   New medications started at the time of admission include Zyprexa.   Increased olanzapine 10 mg at bedtime was to 10 mg BID (10/14)   Increased olanzapine 10 mg BID to 10 mg during day and 15 mg at bedtime (10/15)  10/21: increased olanzapine pm dose from 15 to 20 mg  10/23: started melatonin  3 mg at 7 pm to help patient with circadian rhythm as he has been staying up throughout the night and sleeping a lot during the day and there is some concern for delirium   10/24: consulted anesthesia for MRI brain   10/28: MRI brain complete, decrease AM olanzapine from 10 to 5 mg  10/29: Morning olanzapine decreased to 2.5 mg, evening olanzapine decreased to 15 mg   11/1: Decreased evening olanzapine to 10 mg   11/4: Decreased evening olanzapine to 7.5 mg   11/5: Decreased evening olanzapine to 5 mg   11/11: Moved morning dose of olanzapine to the afternoon as patient appears more agitated in the afternoons/evenings  11/21: Started memantine 5 mg daily   11/26: Discontinued olanzapine 2.5 mg during the afternoon  12/2:   Discontinued memantine 5 mg, daily  2/5: as psychosis has improved with less paranoia, it was thought patient could benefit from a decrease in Zyprexa as it could be worsening is cognitive sxs and given black box warning for his age category. Decreased Zyprexa to 2.5mg at bedtime as a trial. Can restart if psychosis worsens. Seems to be tolerating this well.     The risks, benefits, alternatives, and side effects were discussed and understood by the patient and other caregivers.     Medical Assessment and Plan     Medical diagnoses to be addressed this admission:    # Hip Pain  - New right hip pain, and mild pain in multiple joints. Moderate response to topical antiinflammatory gel and lidocaine patch.   - Medicine consulted for recommendations 12/20    # CHF  # Hypertension  - Continue PTA medications  Furosemide 40 mg daily  Lisinopril 40 mg daily  Metoprolol 50 mg daily  Amlodipine 10 mg daily  Clonidine 0.1 mg BID    # Hyperlipidemia  - Continue PTA Atorvastatin 40 mg     # Primary Hyperparathyroidism  # Hypercalcemia, hypophosphoremia   Increased Ca level to 10.9 and decreased Ph level to 2.3 in the ED   - Consulted endocrinology, who started cinacalcet 30 mg BID on 10/13  - 10/16  endocrinology recommended continuing to trend calcium and albumin to make sure patient does not become hypocalcemic and recommended holding cinacalcet   - 10/17, calcium and albumin wnl, endo recommended cinacalcet 30 mg once daily   - 10/21, per endo continue cincaclcet and recheck calcium and albumin on October 24  - 10/23: per endo, patient needs to follow up outpatient for further management of hyperparathyroidism     # Rhinorrhea  1/15 currently multiple COVID infections on unit. No other symptoms of COVID noted. COVID PCR - on 1/13.    #Chipped tooth: Pt chipped a molar at dinner on 2/4, denied pain. No bleeding.  - would benefit from outpt dental follow up.     Medical course: Patient was physically examined by the ED prior to being transferred to the unit and was found to be medically stable and appropriate for admission.      Consults: Psychiatry, Endocrinology (follow-up of hyperparathyroidism / hypercalcemia and hypertension), neurology     Checklist     Legal Status: Committed   [place] meds: Haldol, Clozaril, Risperdal, Invega, Zyprexa, Seroquel, Abilify    Safety Assessment:   Behavioral Orders   Procedures    Code 1 - Restrict to Unit    Routine Programming     As clinically indicated    Status 15     Every 15 minutes.    Status Individual Observation     Patient SIO status reviewed with team/RN.  Please also refer to RN/team documentation for add'l detail.    -SIO staff to monitor following which have contributed to patient being on SIO:  Patient intrusiveness to other patients  -Possible interventions SIO staff could use to support patient's treatment progress:  Redirection  -When following observed, team will review discontinuation of SIO:  Improvement in intrusive behavior.     Order Specific Question:   CONTINUOUS 24 hours / day     Answer:   Other     Order Specific Question:   Specify distance     Answer:   10 feet     Order Specific Question:   Indications for SIO     Answer:   Severe  intrusiveness       Risk Assessment:  Risk for harm is low  Risk factors: impulsive and past behaviors  Protective factors: family      SIO: No    Disposition: Pending transfer to memory care facility.  Accepted to [place] for 2/24.      Attestations     This patient was seen and discussed with my attending physician.    Luis Finch  MS3 Medical Student    I was present with the medical student who participated in the service and in the documentation of the note. I have verified the history and personally performed the exam and medical decision making. I agree with the assessment and plan of care as documented in the note. Pete Smith M.D., Ph.D.

## 2025-02-07 NOTE — PLAN OF CARE
BEH IP Unit Acuity Rating Score (UARS)  Patient is given one point for every criteria they meet.    CRITERIA SCORING   On a 72 hour hold, court hold, committed, stay of commitment, or revocation. 1    Patient LOS on BEH unit exceeds 20 days. 1  LOS: 118   Patient under guardianship, 55+, otherwise medically complex, or under age 11. 1   Suicide ideation without relief of precipitating factors. 0   Current plan for suicide. 0   Current plan for homicide. 0   Imminent risk or actual attempt to seriously harm another without relief of factors precipitating the attempt. 1   Severe dysfunction in daily living (ex: complete neglect for self care, extreme disruption in vegetative function, extreme deterioration in social interactions). 1   Recent (last 7 days) or current physical aggression in the ED or on unit. 0   Restraints or seclusion episode in past 72 hours. 0   Recent (last 7 days) or current verbal aggression, agitation, yelling, etc., while in the ED or unit. 0   Active psychosis. 1   Need for constant or near constant redirection (from leaving, from others, etc).  0   Intrusive or disruptive behaviors. 0   Patient requires 3 or more hours of individualized nursing care per 8-hour shift (i.e. for ADLs, meds, therapeutic interventions). 0   TOTAL 6

## 2025-02-07 NOTE — PLAN OF CARE
"  Problem: Adult Behavioral Health Plan of Care  Goal: Patient-Specific Goal (Individualization)  Description: You can add care plan individualizations to a care plan. Examples of Individualization might be:  \"Parent requests to be called daily at 9am for status\", \"I have a hard time hearing out of my right ear\", or \"Do not touch me to wake me up as it startles  me\".  Outcome: Progressing  Flowsheets (Taken 2/7/2025 1154)  Patient Personal Strengths:   expressive of emotions   expressive of needs   family/social support  Goal: Adheres to Safety Considerations for Self and Others  Outcome: Progressing     Problem: Psychotic Signs/Symptoms  Goal: Improved Behavioral Control (Psychotic Signs/Symptoms)  Outcome: Progressing     Problem: Suicidal Behavior  Goal: Suicidal Behavior is Absent or Managed  Outcome: Progressing     Problem: Anxiety  Goal: Anxiety Reduction or Resolution  Outcome: Progressing   Goal Outcome Evaluation:    Pt presented with a brighter affect, as compared to 2 days before. He is pleasant and cooperative. He politely requested to be assisted to make a phone call to his daughter. Pt reported that he is happy that he is able to talk to Patel and that daughter reassured him of everything , and that she appreciates and loves him.   Pt is eating and drinking adequately. Observed [facility] to be [facility] off and on. Minimally engaging with peers.     "

## 2025-02-08 PROCEDURE — 250N000013 HC RX MED GY IP 250 OP 250 PS 637

## 2025-02-08 PROCEDURE — 124N000002 HC R&B MH UMMC

## 2025-02-08 RX ADMIN — CINACALCET 30 MG: 30 TABLET ORAL at 08:07

## 2025-02-08 RX ADMIN — CLONIDINE HYDROCHLORIDE 0.1 MG: 0.1 TABLET ORAL at 08:07

## 2025-02-08 RX ADMIN — AMLODIPINE BESYLATE 10 MG: 5 TABLET ORAL at 08:07

## 2025-02-08 RX ADMIN — MELATONIN TAB 3 MG 3 MG: 3 TAB at 20:00

## 2025-02-08 RX ADMIN — OLANZAPINE 2.5 MG: 5 TABLET, ORALLY DISINTEGRATING ORAL at 20:00

## 2025-02-08 RX ADMIN — ATORVASTATIN CALCIUM 40 MG: 20 TABLET, FILM COATED ORAL at 08:07

## 2025-02-08 RX ADMIN — LISINOPRIL 40 MG: 10 TABLET ORAL at 08:07

## 2025-02-08 RX ADMIN — FUROSEMIDE 40 MG: 40 TABLET ORAL at 08:07

## 2025-02-08 RX ADMIN — SENNOSIDES AND DOCUSATE SODIUM 1 TABLET: 50; 8.6 TABLET ORAL at 08:07

## 2025-02-08 RX ADMIN — Medication 25 MG: at 20:00

## 2025-02-08 RX ADMIN — Medication 1 PATCH: at 08:08

## 2025-02-08 ASSESSMENT — ACTIVITIES OF DAILY LIVING (ADL)
ADLS_ACUITY_SCORE: 66
ORAL_HYGIENE: INDEPENDENT
ADLS_ACUITY_SCORE: 66
HYGIENE/GROOMING: INDEPENDENT
ADLS_ACUITY_SCORE: 66
LAUNDRY: WITH SUPERVISION
ADLS_ACUITY_SCORE: 66
ADLS_ACUITY_SCORE: 66
DRESS: INDEPENDENT
ADLS_ACUITY_SCORE: 66

## 2025-02-08 NOTE — PLAN OF CARE
"  Problem: Adult Behavioral Health Plan of Care  Goal: Patient-Specific Goal (Individualization)  Description: You can add care plan individualizations to a care plan. Examples of Individualization might be:  \"Parent requests to be called daily at 9am for status\", \"I have a hard time hearing out of my right ear\", or \"Do not touch me to wake me up as it startles  me\".  Outcome: Progressing  Goal: Adheres to Safety Considerations for Self and Others  Outcome: Progressing     Problem: Suicidal Behavior  Goal: Suicidal Behavior is Absent or Managed  Outcome: Progressing     Problem: Anxiety  Goal: Anxiety Reduction or Resolution  Outcome: Progressing   Goal Outcome Evaluation:    Pt appear more alert this shift. He is pleasant and cooperative . He is eating and drinking adequately. As the day progressed he showed some confusion during the shift, talking about things and expressed some disappointment with staff when he is not understood. Pt denies any pain. Pt sat on the lounge chair most of the shift, watching TV. Interacts with peers. Did not participate in  OT group.     "

## 2025-02-08 NOTE — PLAN OF CARE
Problem: Sleep Disturbance  Goal: Adequate Sleep/Rest  Outcome: Progressing   Goal Outcome Evaluation:     Pt slept for 5 hours this shift. No c/o pain or discomfort. Np PRN given. Pt had a quiet night, No behavioral escalation noted or reported.   Pt's most recent vitals are :  Blood pressure 131/75, pulse 62, temperature 97.8  F (36.6  C), temperature source Oral, resp. rate 16, weight 113.9 kg (251 lb), SpO2 94%.

## 2025-02-09 PROCEDURE — 250N000013 HC RX MED GY IP 250 OP 250 PS 637

## 2025-02-09 PROCEDURE — 124N000002 HC R&B MH UMMC

## 2025-02-09 RX ADMIN — Medication 1 PATCH: at 07:55

## 2025-02-09 RX ADMIN — CLONIDINE HYDROCHLORIDE 0.1 MG: 0.1 TABLET ORAL at 20:46

## 2025-02-09 RX ADMIN — AMLODIPINE BESYLATE 10 MG: 5 TABLET ORAL at 07:52

## 2025-02-09 RX ADMIN — METOPROLOL SUCCINATE 50 MG: 50 TABLET, EXTENDED RELEASE ORAL at 07:53

## 2025-02-09 RX ADMIN — ATORVASTATIN CALCIUM 40 MG: 20 TABLET, FILM COATED ORAL at 07:53

## 2025-02-09 RX ADMIN — SENNOSIDES AND DOCUSATE SODIUM 1 TABLET: 50; 8.6 TABLET ORAL at 07:52

## 2025-02-09 RX ADMIN — MELATONIN TAB 3 MG 3 MG: 3 TAB at 20:46

## 2025-02-09 RX ADMIN — CLONIDINE HYDROCHLORIDE 0.1 MG: 0.1 TABLET ORAL at 07:52

## 2025-02-09 RX ADMIN — Medication 25 MG: at 20:46

## 2025-02-09 RX ADMIN — OLANZAPINE 2.5 MG: 5 TABLET, ORALLY DISINTEGRATING ORAL at 20:46

## 2025-02-09 RX ADMIN — LISINOPRIL 40 MG: 10 TABLET ORAL at 07:53

## 2025-02-09 RX ADMIN — CINACALCET 30 MG: 30 TABLET ORAL at 07:52

## 2025-02-09 RX ADMIN — FUROSEMIDE 40 MG: 40 TABLET ORAL at 07:52

## 2025-02-09 ASSESSMENT — ACTIVITIES OF DAILY LIVING (ADL)
ADLS_ACUITY_SCORE: 66
LAUNDRY: WITH SUPERVISION
ADLS_ACUITY_SCORE: 66
HYGIENE/GROOMING: INDEPENDENT
ADLS_ACUITY_SCORE: 66
ORAL_HYGIENE: INDEPENDENT
ADLS_ACUITY_SCORE: 66
ORAL_HYGIENE: INDEPENDENT
DRESS: INDEPENDENT
HYGIENE/GROOMING: INDEPENDENT
DRESS: INDEPENDENT
ADLS_ACUITY_SCORE: 66

## 2025-02-09 NOTE — PLAN OF CARE
The pt spent of the shift in the milieu, watching [employer] football on TV and interacting with peers. The pt's mood was notably calm, and exhibited a bright in affect, cheering excitedly for the games. The pt occasionally made confusional statements that were overheard. The pt participated appropriately in interactions with other peers. The pt denied experiencing pain and any MH concerns, and contracted for safety. There were no behavioral escalations or safety concerns noted or reported. The pt's appetite was good and maintained adequate fluid intake. The pt complied with med and care.      Problem: Adult Behavioral Health Plan of Care  Goal: Plan of Care Review  Outcome: Progressing  Flowsheets (Taken 2/9/2025 1630)  Plan of Care Reviewed With: patient  Overall Patient Progress: improving  Patient Agreement with Plan of Care: agrees     Problem: Psychotic Signs/Symptoms  Goal: Improved Mood Symptoms (Psychotic Signs/Symptoms)  Outcome: Progressing  Intervention: Optimize Emotion and Mood  Recent Flowsheet Documentation  Taken 2/9/2025 1630 by Jarrod Keenan RN  Supportive Measures:   active listening utilized                           self-reflection promoted   verbalization of feelings encouraged  Diversional Activity: television  Intervention: Optimize Emotion and Mood  Recent Flowsheet Documentation  Taken 2/9/2025 1630 by Jarrod Keenan RN  Supportive Measures:   active listening utilized                                  self-reflection promoted   verbalization of feelings encouraged  Diversional Activity: television   Goal Outcome Evaluation:    Plan of Care Reviewed With: patient Plan of Care Reviewed With: patient    Overall Patient Progress: improvingOverall Patient Progress: improving

## 2025-02-09 NOTE — PLAN OF CARE
The pt was visible in the milieu for most of the shift watching TV and interacting with selected peers. The pt's behavior was consistent with previous observations, demonstrating a calm mood and a bright affect. The pt track conversations with minimal confusion and responded well to redirection. The pt reported no pain and any MH concerns, and contracted for safety. No signs of irritability or behavioral escalations were noted or reported during the shift. The pt maintained a good appetite and adequate fluid intake. The pt complied with med and care. Scheduled HS Clonidine was not given as order parameter not met. The pt verbalized tired and went to bed early.      /66 (BP Location: Right arm, Patient Position: Sitting, Cuff Size: Adult Large)   Pulse 58   Temp 97.6  F (36.4  C) (Oral)   Resp 16   Wt 113.9 kg (251 lb)   SpO2 95%   BMI 40.51 kg/m      Problem: Adult Behavioral Health Plan of Care  Goal: Plan of Care Review  Outcome: Progressing  Flowsheets (Taken 2/8/2025 1700)  Plan of Care Reviewed With: patient  Overall Patient Progress: improving  Patient Agreement with Plan of Care: agrees     Problem: Psychotic Signs/Symptoms  Goal: Improved Mood Symptoms (Psychotic Signs/Symptoms)  Outcome: Progressing  Intervention: Optimize Emotion and Mood  Recent Flowsheet Documentation  Taken 2/8/2025 1700 by Jarrod Keenan RN  Supportive Measures:   active listening utilized                    self-reflection promoted   verbalization of feelings encouraged  Diversional Activity: television  Intervention: Optimize Emotion and Mood  Recent Flowsheet Documentation  Taken 2/8/2025 1700 by Jarrod Keenan, RN  Supportive Measures:   active listening utilized                       self-reflection promoted   verbalization of feelings encouraged  Diversional Activity: television   Goal Outcome Evaluation:    Plan of Care Reviewed With: patient Plan of Care Reviewed With: patient    Overall Patient Progress:  improvingOverall Patient Progress: improving

## 2025-02-09 NOTE — PLAN OF CARE
Problem: Sleep Disturbance  Goal: Adequate Sleep/Rest  Outcome: Progressing   Goal Outcome Evaluation:    Pt slept in his bed more than usual this shift. He denies any physical pain. No PRN was given. No new concern at this time. Pt slept for 5.25  hours total this shift.    Most Recent Vitals are:  Blood pressure 108/66, pulse 58, temperature 97.6  F (36.4  C), temperature source Oral, resp. rate 16, weight 113.9 kg (251 lb), SpO2 95%.

## 2025-02-09 NOTE — PLAN OF CARE
"  Problem: Adult Behavioral Health Plan of Care  Goal: Patient-Specific Goal (Individualization)  Description: You can add care plan individualizations to a care plan. Examples of Individualization might be:  \"Parent requests to be called daily at 9am for status\", \"I have a hard time hearing out of my right ear\", or \"Do not touch me to wake me up as it startles  me\".  Outcome: Progressing  Goal: Adheres to Safety Considerations for Self and Others  Outcome: Progressing  Intervention: Develop and Maintain Individualized Safety Plan  Recent Flowsheet Documentation  Taken 2/9/2025 1300 by Rocío Curry RN  Safety Measures:   safety rounds completed   environmental rounds completed  Intervention: Develop and Maintain Individualized Safety Plan  Recent Flowsheet Documentation  Taken 2/9/2025 1300 by Rocío Curry RN  Safety Measures:   safety rounds completed   environmental rounds completed     Problem: Psychotic Signs/Symptoms  Goal: Improved Behavioral Control (Psychotic Signs/Symptoms)  Outcome: Progressing  Intervention: Manage Behavior  Recent Flowsheet Documentation  Taken 2/9/2025 1300 by Rocío Curry RN  De-Escalation Techniques:   appropriate behavior reinforced   verbally redirected     Problem: Depression  Goal: Improved Mood  Outcome: Progressing  Intervention: Monitor and Manage Depressive Symptoms  Recent Flowsheet Documentation  Taken 2/9/2025 1300 by Rocío Curry RN  Supportive Measures:   active listening utilized   self-reflection promoted   verbalization of feelings encouraged  Family/Support System Care:   self-care encouraged   presence promoted     Problem: Suicidal Behavior  Goal: Suicidal Behavior is Absent or Managed  Outcome: Progressing  Intervention: Provide Immediate and Ongoing Protective Physical Environment  Recent Flowsheet Documentation  Taken 2/9/2025 1300 by Rocío Curry RN  Safe Transition Promotion: protective factors " promoted     Problem: Pain Acute  Goal: Optimal Pain Control and Function  Outcome: Progressing  Intervention: Optimize Psychosocial Wellbeing  Recent Flowsheet Documentation  Taken 2/9/2025 1300 by Rocío Curry RN  Supportive Measures:   active listening utilized   self-reflection promoted   verbalization of feelings encouraged     Problem: Anxiety  Goal: Anxiety Reduction or Resolution  Outcome: Progressing  Intervention: Promote Anxiety Reduction  Recent Flowsheet Documentation  Taken 2/9/2025 1300 by Rocío Curry RN  Supportive Measures:   active listening utilized   self-reflection promoted   verbalization of feelings encouraged  Family/Support System Care:   self-care encouraged   presence promoted   Goal Outcome Evaluation:    Plan of Care Reviewed With: patient      Pt is visible in the milieu, sits on his lounge chair, takes naps here and there. Pt is cooperative with his medications, says some illogical statements, intermittently confused but otherwise pleasant. Pt interacts with peers. Interactions are appropriate. Pt situates himself in front of  TV and watches whatever is on. Pt is eating and drinking adequately. No behavioral concerns this shift.

## 2025-02-10 PROCEDURE — 250N000013 HC RX MED GY IP 250 OP 250 PS 637

## 2025-02-10 PROCEDURE — 124N000002 HC R&B MH UMMC

## 2025-02-10 PROCEDURE — 99232 SBSQ HOSP IP/OBS MODERATE 35: CPT | Performed by: PSYCHIATRY & NEUROLOGY

## 2025-02-10 RX ADMIN — SENNOSIDES AND DOCUSATE SODIUM 1 TABLET: 50; 8.6 TABLET ORAL at 07:58

## 2025-02-10 RX ADMIN — CLONIDINE HYDROCHLORIDE 0.1 MG: 0.1 TABLET ORAL at 07:58

## 2025-02-10 RX ADMIN — CLONIDINE HYDROCHLORIDE 0.1 MG: 0.1 TABLET ORAL at 20:30

## 2025-02-10 RX ADMIN — LISINOPRIL 40 MG: 10 TABLET ORAL at 07:58

## 2025-02-10 RX ADMIN — MELATONIN TAB 3 MG 3 MG: 3 TAB at 20:29

## 2025-02-10 RX ADMIN — AMLODIPINE BESYLATE 10 MG: 5 TABLET ORAL at 07:58

## 2025-02-10 RX ADMIN — Medication 25 MG: at 20:31

## 2025-02-10 RX ADMIN — FUROSEMIDE 40 MG: 40 TABLET ORAL at 07:58

## 2025-02-10 RX ADMIN — CINACALCET 30 MG: 30 TABLET ORAL at 07:58

## 2025-02-10 RX ADMIN — ATORVASTATIN CALCIUM 40 MG: 20 TABLET, FILM COATED ORAL at 07:58

## 2025-02-10 RX ADMIN — Medication 1 PATCH: at 08:05

## 2025-02-10 ASSESSMENT — ACTIVITIES OF DAILY LIVING (ADL)
ADLS_ACUITY_SCORE: 66
HYGIENE/GROOMING: INDEPENDENT
ADLS_ACUITY_SCORE: 66
DRESS: INDEPENDENT
DRESS: INDEPENDENT
ADLS_ACUITY_SCORE: 66
ORAL_HYGIENE: INDEPENDENT
ADLS_ACUITY_SCORE: 66
ORAL_HYGIENE: INDEPENDENT;PROMPTS
HYGIENE/GROOMING: INDEPENDENT

## 2025-02-10 NOTE — PLAN OF CARE
"  Problem: Depression  Goal: Improved Mood  Outcome: Not Progressing     Problem: Anxiety  Goal: Anxiety Reduction or Resolution  Outcome: Not Progressing     Problem: Adult Behavioral Health Plan of Care  Goal: Adheres to Safety Considerations for Self and Others  Outcome: Progressing  Goal: Absence of New-Onset Illness or Injury  Outcome: Progressing     Problem: Suicidal Behavior  Goal: Suicidal Behavior is Absent or Managed  Outcome: Progressing   Goal Outcome Evaluation:    Plan of Care Reviewed With: patient      Pt was pleasant and cooperative in the morning. As the day progressed, he was more confused, annoyed , paranoid  and  irritable with staff. Pt was so bored, complained  \" nothing to do here!\" Pt calls staff and tries to communicate his needs. He tried to advocate for a peer and got frustrated about it. Pt was encouraged to focus on self. Pt is eating and drinking adequately.   "

## 2025-02-10 NOTE — PLAN OF CARE
Team Note Due:  Monday    Assessment/Intervention/Current Symtoms and Care Coordination:  Chart review and met with team, discussed pt progress, symptomology, and response to treatment.  Discussed the discharge plan and any potential impediments to discharge. Clifford Aaron is emergency guardian/conservator until 02/27/2025. A petition for permanent guardianship/conservatorship has been filed and a hearing is scheduled for 2/27/25.    MD plans to have Plan of Care forms completed tomorrow. Updated Ofelia Stefan    Discharge Plan or Goal:  Memory care facility     Barriers to Discharge:  Patient requires further psychiatric stabilization due to current symptomology, medication management with changes subject to provider, coordination with outside supports, and aftercare planning. Pt is under civil commitment.     Referral Status:  Memory Care approved:  [facility]. Tour completed 11/30. Per Clifford on 1/8, she would like to move forward with a referral. Tentative move in date is 2/24/25.  Vera (Exec Director): [phone]      Memory Care facilities currently in process:  [facility]. Tour completed 11/27.  [facility]. Tour scheduled 1/8/25. Have immediate openings and will accept EW. Family not requesting referral yet.  [facility]. Per Clifford on 1/20, she would like to move forward with a referral. Records sent 1/20.  [email or web address]    Memory Care facilities pending family review:  [facility].  [facility].  [facility].  [facility].  [facility].    Memory Care facilities declined:  [facility] (private pay only, no EW).  [facility] [address] (private pay only, no EW).  [facility] (no openings).  [facility]. Tour completed 11/29. Records sent 12/2/24. Declined 12/19/24  (don't feel they are an appropriate facility for pt's needs).  Lorraine (): [email or web address]; [phone]  Isatu (director of nursing): [email or web address]  [facility].  Denied 12/26 (concerned about dementia with psychosis, history of hallucinations, high elopement risk, still on 1:1).  Nikki Noel: [email or web address]; Office [phone], Fax [phone]   [facility]. Not accepting EW as of 1/7/25.    Legal Status:  MI Commitment with [facility]: [place]  File Number: [id number]  Start and expiration date of commitment: 10/24/24 - 04/24/25    [facility] meds: Haldol, Clozaril, Risperdal, Invega, Zyprexa, Seroquel, Abilify    PPS/CM:  Shelby Chowdary: [phone]  [email or web address]    Contacts:  Maria C Aaron (Daughter): [phone]   Clifford Aaron (ex-wife): [phone]     No Del Angel (guardianship/conservatorship ): [phone]  [email or web address]    Caro Ross ([facility]ate court visitor for guardianship): [phone]  [email or web address]    Derrell Duff ([place] ): [phone]  [email or web address]     Upcoming Meetings and Dates/Important Information and next steps:  Schedule ambulance transport when discharge is confirmed  Coordinate discharge/paperwork with Ofelia NAVAS  Send PD/Discharge Summary to CM  [facility]    Provisional Discharge and Change of Status needed at discharge

## 2025-02-10 NOTE — PROVIDER NOTIFICATION
02/10/25 0948   Individualization/Patient Specific Goals   Patient Personal Strengths expressive of emotions;expressive of needs;family/social support   Patient Vulnerabilities housing insecurity;lacks insight into illness;poor impulse control   Interprofessional Rounds   Summary Discussed patient progress and pending discharge to [facility] on 2/24.   Participants nursing;CTC;psychiatrist;other (see comments)   Behavioral Team Discussion   Participants Dr. Smith; Rocío Curry RN; [facility]; medical students   Progress Minimal   Anticipated length of stay 60+ days   Continued Stay Criteria/Rationale Placement pending   Medical/Physical See H&P   Precautions See below   Plan Psychiatric assessment/Medication management. Therapeutic Milieu. Individual care planning and after care planning. Patient to participate in unit groups and activities. Individual and group support on unit.   Safety Plan Completed by unit therapist   Anticipated Discharge Disposition another healthcare facility     PRECAUTIONS AND SAFETY    Behavioral Orders   Procedures    Code 1 - Restrict to Unit    Routine Programming     As clinically indicated    Status 15     Every 15 minutes.    Status Individual Observation     Patient SIO status reviewed with team/RN.  Please also refer to RN/team documentation for add'l detail.    -SIO staff to monitor following which have contributed to patient being on SIO:  Patient intrusiveness to other patients  -Possible interventions SIO staff could use to support patient's treatment progress:  Redirection  -When following observed, team will review discontinuation of SIO:  Improvement in intrusive behavior.     Order Specific Question:   CONTINUOUS 24 hours / day     Answer:   Other     Order Specific Question:   Specify distance     Answer:   10 feet     Order Specific Question:   Indications for SIO     Answer:   Severe intrusiveness       Safety  Safety WDL: WDL  Patient Location: [facility]  patient room, own, hallway  Observed Behavior: calm, sitting  Observed Behavior (Comment): Watching TV  Airway Safety Measures: other (see comments)  Safety Measures: safety rounds completed, environmental rounds completed  Diversional Activity: television  De-Escalation Techniques: appropriate behavior reinforced, verbally redirected  Suicidality: Status 15  Assault: status 15  Elopement Assessment: Loitering near exit doors, Statements about wanting to leave  Elopement Interventions: status 15  Sexual: status 15

## 2025-02-10 NOTE — PLAN OF CARE
BEH IP Unit Acuity Rating Score (UARS)  Patient is given one point for every criteria they meet.    CRITERIA SCORING   On a 72 hour hold, court hold, committed, stay of commitment, or revocation. 1    Patient LOS on BEH unit exceeds 20 days. 1  LOS: 121   Patient under guardianship, 55+, otherwise medically complex, or under age 11. 1   Suicide ideation without relief of precipitating factors. 0   Current plan for suicide. 0   Current plan for homicide. 0   Imminent risk or actual attempt to seriously harm another without relief of factors precipitating the attempt. 1   Severe dysfunction in daily living (ex: complete neglect for self care, extreme disruption in vegetative function, extreme deterioration in social interactions). 1   Recent (last 7 days) or current physical aggression in the ED or on unit. 0   Restraints or seclusion episode in past 72 hours. 0   Recent (last 7 days) or current verbal aggression, agitation, yelling, etc., while in the ED or unit. 0   Active psychosis. 1   Need for constant or near constant redirection (from leaving, from others, etc).  0   Intrusive or disruptive behaviors. 0   Patient requires 3 or more hours of individualized nursing care per 8-hour shift (i.e. for ADLs, meds, therapeutic interventions). 0   TOTAL 6

## 2025-02-10 NOTE — PROGRESS NOTES
----------------------------------------------------------------------------------------------------------  [facility]  Psychiatry Progress Note  Hospital Day #121     Interim History:     The patient's care was discussed with the treatment team and chart notes were reviewed.    Identifier: Estevan Aaron is a 64 year old male with previous psychiatric diagnoses of  generalized anxiety disorder, Neurocognitive disorder, admitted from the ED 10/12/2024 due to concern for HI and psychosis, which now have resolved, in the psychosocial stressors (recent divorce).     Sleep: 6 hours (02/10/25 0600)  Scheduled medications: Took all scheduled medications as prescribed  Psychiatric PRN medications:  none       Staff Report:   No acute events over night. Intermittently visible in the lounge. No significant change in condition. Visible in lounge watching TV with select peers, dosing on and off. Blunted and flat affect. Mood early in day was calm, cooperative and pleasant; but anxious, irritable with moments of agitation in evening. Easily redirectable. Appears disorganized and disoriented to situation and time. Dismissive of all mental health and psych symptoms. Denies pain and bowel/bladder concerns. Adequate PO intake. Hygiene inadequate. Compliant with medications. No safety concerns nor behavioral escalation.     Subjective:     Patient Interview:  Santos was interviewed in the conference room. The weekend went well for him. He feels god about tapering down medications.     ROS:  Patient denies acute concerns apart from knee pain noted above.     Objective:     Vitals:  /70 (BP Location: Right arm)   Pulse 56   Temp 98  F (36.7  C) (Temporal)   Resp 18   Wt 113.9 kg (251 lb)   SpO2 95%   BMI 40.51 kg/m      Allergies:  No Known Allergies    Current Medications:  Scheduled:  Current Facility-Administered Medications   Medication Dose Route Frequency Provider Last Rate  Last Admin    acetaminophen (TYLENOL) tablet 650 mg  650 mg Oral Q4H PRN Nikki Caceres MD   650 mg at 01/09/25 0228    alum & mag hydroxide-simethicone (MAALOX) suspension 30 mL  30 mL Oral Q4H PRN Nikki Caceres MD   30 mL at 10/17/24 0837    amLODIPine (NORVASC) tablet 10 mg  10 mg Oral Daily Presley Barrera MD   10 mg at 02/10/25 0758    atorvastatin (LIPITOR) tablet 40 mg  40 mg Oral Daily Nikki Caceres MD   40 mg at 02/10/25 0758    cholecalciferol (VITAMIN D3) capsule 1,250 mcg  1,250 mcg Oral Q7 Days SirishaEveliaDO   1,250 mcg at 02/04/25 1256    cinacalcet (SENSIPAR) tablet 30 mg  30 mg Oral Daily Presley Barrera MD   30 mg at 02/10/25 0758    cloNIDine (CATAPRES) tablet 0.1 mg  0.1 mg Oral BID Presley Barrera MD   0.1 mg at 02/10/25 0758    diclofenac (VOLTAREN) 1 % topical gel 2 g  2 g Topical 4x Daily PRN Rocío Lopez MD   2 g at 12/22/24 2032    furosemide (LASIX) tablet 40 mg  40 mg Oral Daily Presley Barrera MD   40 mg at 02/10/25 0758    gabapentin (NEURONTIN) capsule 100 mg  100 mg Oral Q6H PRN Nikki Caceres MD   100 mg at 12/24/24 0046    hydrocortisone (CORTAID) 0.5 % cream   Topical Daily PRN Savi Chamorro MD        Lidocaine (LIDOCARE) 4 % Patch 1 patch  1 patch Transdermal Q24H PRN Rocío Lopez MD   1 patch at 12/22/24 2023    lisinopril (ZESTRIL) tablet 40 mg  40 mg Oral Daily Presley Barrera MD   40 mg at 02/10/25 0758    melatonin tablet 3 mg  3 mg Oral At Bedtime Presley Barrera MD   3 mg at 02/09/25 2046    metoprolol succinate ER (TOPROL XL) 24 hr tablet 50 mg  50 mg Oral Daily Presley Barrera MD   50 mg at 02/09/25 0753    nicotine (NICODERM CQ) 14 MG/24HR 24 hr patch 1 patch  1 patch Transdermal Daily Evelia Grimm,    1 patch at 02/10/25 0805    nicotine (NICORETTE) gum 2 mg  2 mg Buccal Q1H PRN González Garcia MD   2 mg at 10/24/24 1254    OLANZapine zydis (zyPREXA) ODT half-tab 2.5 mg  2.5 mg Oral At  Bedtime Nikki Caceres MD   2.5 mg at 02/09/25 2046    Or    OLANZapine (zyPREXA) injection 5 mg  5 mg Intramuscular At Bedtime Nikki Caceres MD        OLANZapine (zyPREXA) tablet 5 mg  5 mg Oral TID PRN Evelia Grimm DO   5 mg at 01/05/25 1645    Or    OLANZapine (zyPREXA) injection 5 mg  5 mg Intramuscular TID PRN Evelia Grimm DO        polyethylene glycol (MIRALAX) Packet 17 g  17 g Oral Daily PRN Nikki Caceres MD        senna-docusate (SENOKOT-S/PERICOLACE) 8.6-50 MG per tablet 1 tablet  1 tablet Oral Daily Evelia Grimm DO   1 tablet at 02/10/25 0758    traZODone (DESYREL) half-tab 25 mg  25 mg Oral At Bedtime Rocío Lopez MD   25 mg at 02/09/25 2046    traZODone (DESYREL) half-tab 25 mg  25 mg Oral At Bedtime PRN Evelia Grimm DO   25 mg at 12/24/24 0046       PRN:  Current Facility-Administered Medications   Medication Dose Route Frequency Provider Last Rate Last Admin    acetaminophen (TYLENOL) tablet 650 mg  650 mg Oral Q4H PRN Nikki Caceres MD   650 mg at 01/09/25 0228    alum & mag hydroxide-simethicone (MAALOX) suspension 30 mL  30 mL Oral Q4H PRN Nikki Caceres MD   30 mL at 10/17/24 0837    amLODIPine (NORVASC) tablet 10 mg  10 mg Oral Daily Presley Barrera MD   10 mg at 02/10/25 0758    atorvastatin (LIPITOR) tablet 40 mg  40 mg Oral Daily Nikki Caceres MD   40 mg at 02/10/25 0758    cholecalciferol (VITAMIN D3) capsule 1,250 mcg  1,250 mcg Oral Q7 Days Evelia Grimm DO   1,250 mcg at 02/04/25 1256    cinacalcet (SENSIPAR) tablet 30 mg  30 mg Oral Daily Presley Barrera MD   30 mg at 02/10/25 0758    cloNIDine (CATAPRES) tablet 0.1 mg  0.1 mg Oral BID Presley Barrera MD   0.1 mg at 02/10/25 0758    diclofenac (VOLTAREN) 1 % topical gel 2 g  2 g Topical 4x Daily PRN Rocío Lopez MD   2 g at 12/22/24 2032    furosemide (LASIX) tablet 40 mg  40 mg Oral Daily Presley Barrera MD   40 mg at 02/10/25 0758    gabapentin (NEURONTIN) capsule  100 mg  100 mg Oral Q6H PRN Nikki Caceres MD   100 mg at 12/24/24 0046    hydrocortisone (CORTAID) 0.5 % cream   Topical Daily PRN Savi Chamorro MD        Lidocaine (LIDOCARE) 4 % Patch 1 patch  1 patch Transdermal Q24H PRN Rocío Lopez MD   1 patch at 12/22/24 2023    lisinopril (ZESTRIL) tablet 40 mg  40 mg Oral Daily Presley Barrera MD   40 mg at 02/10/25 0758    melatonin tablet 3 mg  3 mg Oral At Bedtime Presley Barrera MD   3 mg at 02/09/25 2046    metoprolol succinate ER (TOPROL XL) 24 hr tablet 50 mg  50 mg Oral Daily Presley Barrera MD   50 mg at 02/09/25 0753    nicotine (NICODERM CQ) 14 MG/24HR 24 hr patch 1 patch  1 patch Transdermal Daily Evelia Grimm DO   1 patch at 02/10/25 0805    nicotine (NICORETTE) gum 2 mg  2 mg Buccal Q1H PRN González Garcia MD   2 mg at 10/24/24 1254    OLANZapine zydis (zyPREXA) ODT half-tab 2.5 mg  2.5 mg Oral At Bedtime Nikki Caceres MD   2.5 mg at 02/09/25 2046    Or    OLANZapine (zyPREXA) injection 5 mg  5 mg Intramuscular At Bedtime Nikki Caceres MD        OLANZapine (zyPREXA) tablet 5 mg  5 mg Oral TID PRN Evelia Grimm DO   5 mg at 01/05/25 1645    Or    OLANZapine (zyPREXA) injection 5 mg  5 mg Intramuscular TID PRN Evelia Grimm DO        polyethylene glycol (MIRALAX) Packet 17 g  17 g Oral Daily PRN Nikki Caceres MD        senna-docusate (SENOKOT-S/PERICOLACE) 8.6-50 MG per tablet 1 tablet  1 tablet Oral Daily Evelia Grimm DO   1 tablet at 02/10/25 0758    traZODone (DESYREL) half-tab 25 mg  25 mg Oral At Bedtime Rocío Lopez MD   25 mg at 02/09/25 2046    traZODone (DESYREL) half-tab 25 mg  25 mg Oral At Bedtime PRN Evelia Grimm DO   25 mg at 12/24/24 0046     Labs and Imaging:  Data this admission:  - CBC unremarkable  - CMP unremarkable  - TSH normal  - UDS negative  - Vit D low  - Hgb A1c 5.9 (10/13/24)  - Lipids unremarkable  - Vit B12 normal  - Folate normal  - Urinalysis unremarkable  -  "EKG normal sinus rhythm, QTc 390 ms  - Head CT showed no acute changes  - HIV non reactive  - Treponema antibody non reactive  - ESR wnl   - Ceruloplasmin wnl   - BOOGIE negative  - Lyme negative      Parathyroid hormone:   85 (10/13)     Calcium:  11.4 (10/14) -> 10.3 (10/16) -> 9.8 (10/19) -> 10.6 (10/21) -> 10.3 (10/23)     Albumin:  4.3 (10/14) -->  4.3 (10/16) -> 4.1 (10/17) -> 4.2 (10/19) -> 4.5 (10/21) -> 4.3 (10/23)      Mental Status Exam:     Oriented to:  Person/Self  General:  Alert, eating in the lounge  Appearance:  Appears stated age, dressed in own clothing, overweight, grooming adequate with reported shower today  Behavior/Attitude: Cooperative, irritable   Eye Contact: Appropriate  Psychomotor: Normal and No evidence of tics, dystonia, or tardive dyskinesia  no catatonia present  Speech:  loud volume/tone  Language: Fluent in English with appropriate syntax and vocabulary.  Mood:  \"Good\"  Affect:   appropriate and congruent with mood  Thought Process:   concrete, disorganized, confused  Thought Content:   No SI/HI/AH/VH;  No apparent delusions today  Associations:  loose  Insight:  impaired due to neurocognitive disorder   Judgment:  impaired due to neurocognitive disorder  Impulse control: impaired  Attention Span:  inattentive, limited  Concentration:   impaired by neurocognitive disorder  Recent and Remote Memory:   remote memory intact, recent memory impaired  Fund of Knowledge: average  Muscle Strength and Tone: normal  Gait and Station: Normal      Psychiatric Assessment     Estevan Aaron is a 64 year old male with previous psychiatric diagnoses of GEORGINA admitted from the ER on 10/12/2024 due to concern for HI and psychosis in the context of medical issues (hyperparathyroidism, hypercalcemia), psychosocial stressors including with recent divorce and selling his home. This is the patient's first psychiatric hospitalization. The MSE on admission was pertinent for a thought process which was " perseverative, circumstantial, tangential, disorganized and tangential; with rambling and looseness of associations. Psychological contributions to presentation included lack of insight. Social factors contributing to presentation included isolation, recent divorce, selling his house, and moving from hotel to hotel. Biological contributions to presentation included a history of hyperparathyroidism with chronic hypercalcemia per charts as well as a history of methamphetamine use per collateral from ex-wife.     Per collateral from ex-wife, Santos has had paranoid ideations since they first met. He has always felt like people are out to get him or trying to rip him off. She says that he has had visual hallucinations (he will point things out that the rest of the family can't see) for a long time, but the family would just go along with him to avoid making him angry. This timeline and presentation could be consistent with diagnosis of paranoid personality disorder. Things began to worsen around the beginning of the COVID pandemic, when Santos became more isolated and the family started to notice cognitive issues such as impaired memory. Other things that could be contributing to his presentation is hyperparathyroidism with hypercalcemia. However, patient's calcium returned to normal limits on 10/16, and patient continues to have disorganized behavior unrelated to calcium elevation, so likely this is not a significant contributing factor to patient's presentation.      Currently, Santos is at times disoriented, but easily re-directable. Presents with some difficulty of word articulation, which contributes to his frustration when interacting with psych team, as he finds it difficult to explain himself. He occasionally has struggled to attend to ADLS particularly bathing and required frequent encouragement, likely due to underlying disorganized behavior. Overall, he is mostly independent in the unit. His confused behavior tends to  increase in the evenings, seemingly related to a Neurocognitive Disorder diagnosis, and this could evolve to be his new baseline. He does not present as a danger to himself or others so his SIO was discontinued. Santos does not present with any new episodes of physical agitation and is able to attend groups and interact with peers without major altercations. Patient currently is stable with current neuroleptic doses in regards to improved paranoia. However, neuroleptics could be worsening his brain fog and may benefit from a down titration as a trial while awaiting placement. He is doing well with a reduction of Zyprexa and the plan is to discontinue it week of 2/10 before expected discharge on 2/24. Due to inability to care for self outside a supportive sxs, would benefit from continued hospital stay with discharge to group home/nursing home when available.          Psychiatric Plan by Diagnosis     Today's changes:  - Discontinuing olanzapine    # Major Neurocognitive Disorder  1. Medications:  - None     3. Additional Plans:  - Patient will be treated in therapeutic milieu with appropriate individual and group therapies as described  - Pending memory facility placement.      # Unspecified anxiety vs Generalized Anxiety Disorder  - Monitor for symptoms.  - Fluoxetine held due to suspicion of ongoing manic symptoms     Psychiatric Hospital Course:      Estevan Aaron was admitted to [facility] on a 72 hour hold.   Medications:  PTA fluoxetine was held due to concern for worsening of zelalem   New medications started at the time of admission include Zyprexa.   Increased olanzapine 10 mg at bedtime was to 10 mg BID (10/14)   Increased olanzapine 10 mg BID to 10 mg during day and 15 mg at bedtime (10/15)  10/21: increased olanzapine pm dose from 15 to 20 mg  10/23: started melatonin 3 mg at 7 pm to help patient with circadian rhythm as he has been staying up throughout the night and sleeping a lot during the day and there  is some concern for delirium   10/24: consulted anesthesia for MRI brain   10/28: MRI brain complete, decrease AM olanzapine from 10 to 5 mg  10/29: Morning olanzapine decreased to 2.5 mg, evening olanzapine decreased to 15 mg   11/1: Decreased evening olanzapine to 10 mg   11/4: Decreased evening olanzapine to 7.5 mg   11/5: Decreased evening olanzapine to 5 mg   11/11: Moved morning dose of olanzapine to the afternoon as patient appears more agitated in the afternoons/evenings  11/21: Started memantine 5 mg daily   11/26: Discontinued olanzapine 2.5 mg during the afternoon  12/2:   Discontinued memantine 5 mg, daily  2/5: as psychosis has improved with less paranoia, it was thought patient could benefit from a decrease in Zyprexa as it could be worsening is cognitive sxs and given black box warning for his age category. Decreased Zyprexa to 2.5mg at bedtime as a trial. Can restart if psychosis worsens. Seems to be tolerating this well.     The risks, benefits, alternatives, and side effects were discussed and understood by the patient and other caregivers.     Medical Assessment and Plan     Medical diagnoses to be addressed this admission:    # Hip Pain  - New right hip pain, and mild pain in multiple joints. Moderate response to topical antiinflammatory gel and lidocaine patch.   - Medicine consulted for recommendations 12/20    # CHF  # Hypertension  - Continue PTA medications  Furosemide 40 mg daily  Lisinopril 40 mg daily  Metoprolol 50 mg daily  Amlodipine 10 mg daily  Clonidine 0.1 mg BID    # Hyperlipidemia  - Continue PTA Atorvastatin 40 mg     # Primary Hyperparathyroidism  # Hypercalcemia, hypophosphoremia   Increased Ca level to 10.9 and decreased Ph level to 2.3 in the ED   - Consulted endocrinology, who started cinacalcet 30 mg BID on 10/13  - 10/16 endocrinology recommended continuing to trend calcium and albumin to make sure patient does not become hypocalcemic and recommended holding cinacalcet    - 10/17, calcium and albumin wnl, endo recommended cinacalcet 30 mg once daily   - 10/21, per endo continue cincaclcet and recheck calcium and albumin on October 24  - 10/23: per endo, patient needs to follow up outpatient for further management of hyperparathyroidism     # Rhinorrhea  1/15 currently multiple COVID infections on unit. No other symptoms of COVID noted. COVID PCR - on 1/13.    #Chipped tooth: Pt chipped a molar at dinner on 2/4, denied pain. No bleeding.  - would benefit from outpt dental follow up.     Medical course: Patient was physically examined by the ED prior to being transferred to the unit and was found to be medically stable and appropriate for admission.      Consults: Psychiatry, Endocrinology (follow-up of hyperparathyroidism / hypercalcemia and hypertension), neurology     Checklist     Legal Status: Committed   [facility] meds: Haldol, Clozaril, Risperdal, Invega, Zyprexa, Seroquel, Abilify    Safety Assessment:   Behavioral Orders   Procedures    Code 1 - Restrict to Unit    Routine Programming     As clinically indicated    Status 15     Every 15 minutes.    Status Individual Observation     Patient SIO status reviewed with team/RN.  Please also refer to RN/team documentation for add'l detail.    -SIO staff to monitor following which have contributed to patient being on SIO:  Patient intrusiveness to other patients  -Possible interventions SIO staff could use to support patient's treatment progress:  Redirection  -When following observed, team will review discontinuation of SIO:  Improvement in intrusive behavior.     Order Specific Question:   CONTINUOUS 24 hours / day     Answer:   Other     Order Specific Question:   Specify distance     Answer:   10 feet     Order Specific Question:   Indications for SIO     Answer:   Severe intrusiveness       Risk Assessment:  Risk for harm is low  Risk factors: impulsive and past behaviors  Protective factors: family      SIO: No    Disposition:  Pending transfer to memory care facility.  Accepted to [facility] for 2/24.      Attestations     This patient was seen and discussed with my attending physician.  Pan Mcfarlane  MS3 Medical Student    I was present with the medical student who participated in the service and in the documentation of the note. I have verified the history and personally performed the exam and medical decision making. I agree with the assessment and plan of care as documented in the note. Pete Smith M.D., Ph.D.

## 2025-02-10 NOTE — PLAN OF CARE
Problem: Psychotic Signs/Symptoms  Goal: Increased Participation and Engagement (Psychotic Signs/Symptoms)  Outcome: Not Progressing  Intervention: Facilitate Participation and Engagement  Recent Flowsheet Documentation  Taken 2/10/2025 1646 by Tamie Fraser RN  Supportive Measures: active listening utilized  Diversional Activity: television       Problem: Sleep Disturbance  Goal: Adequate Sleep/Rest  Outcome: Progressing      Problem: Anxiety  Goal: Anxiety Reduction or Resolution  2/10/2025 1729 by Tamie Fraser RN  Outcome: Progressing  2/10/2025 1728 by Tamie Fraser RN  Outcome: Progressing  Problem: Sleep Disturbance  Goal: Adequate Sleep/Rest  Outcome: Progressing     Problem: Anxiety  Goal: Anxiety Reduction or Resolution  2/10/2025 1729 by Tamie Fraser RN  Outcome: Progressing  2/10/2025 1728 by Tamie Fraser RN  Outcome: Progressing  Pt was visible in the lounge watching TV with select peers. Presented with a flat affect but pleasant upon approach. Pt denies pain  all psych and mental health symptoms upon assessments.   PO intake adequate. Pt was compliant with medication and contracted for safety. No behavioral concerns.

## 2025-02-10 NOTE — PLAN OF CARE
Problem: Sleep Disturbance  Goal: Adequate Sleep/Rest  Outcome: Progressing    Pt appears to have slept for 5 hours.  He woke up intermittently and sat/sleep in the lounge area. He refused redirection to promote sleep. Pt denies pain, anxiety, depression, and SI/SIB. No PRN medications were given. 15 minutes safety checks were in place. Staff will continue to offer support to pt.

## 2025-02-11 PROCEDURE — 124N000002 HC R&B MH UMMC

## 2025-02-11 PROCEDURE — 250N000013 HC RX MED GY IP 250 OP 250 PS 637

## 2025-02-11 PROCEDURE — 99232 SBSQ HOSP IP/OBS MODERATE 35: CPT | Mod: GC | Performed by: PSYCHIATRY & NEUROLOGY

## 2025-02-11 RX ADMIN — CINACALCET 30 MG: 30 TABLET ORAL at 08:03

## 2025-02-11 RX ADMIN — FUROSEMIDE 40 MG: 40 TABLET ORAL at 08:03

## 2025-02-11 RX ADMIN — Medication 25 MG: at 21:29

## 2025-02-11 RX ADMIN — ATORVASTATIN CALCIUM 40 MG: 20 TABLET, FILM COATED ORAL at 08:03

## 2025-02-11 RX ADMIN — Medication 1250 MCG: at 11:27

## 2025-02-11 RX ADMIN — LISINOPRIL 40 MG: 10 TABLET ORAL at 08:02

## 2025-02-11 RX ADMIN — AMLODIPINE BESYLATE 10 MG: 5 TABLET ORAL at 08:02

## 2025-02-11 RX ADMIN — CLONIDINE HYDROCHLORIDE 0.1 MG: 0.1 TABLET ORAL at 21:27

## 2025-02-11 RX ADMIN — METOPROLOL SUCCINATE 50 MG: 50 TABLET, EXTENDED RELEASE ORAL at 08:03

## 2025-02-11 RX ADMIN — Medication 1 PATCH: at 08:11

## 2025-02-11 RX ADMIN — MELATONIN TAB 3 MG 3 MG: 3 TAB at 21:27

## 2025-02-11 RX ADMIN — CLONIDINE HYDROCHLORIDE 0.1 MG: 0.1 TABLET ORAL at 08:03

## 2025-02-11 RX ADMIN — SENNOSIDES AND DOCUSATE SODIUM 1 TABLET: 50; 8.6 TABLET ORAL at 08:03

## 2025-02-11 ASSESSMENT — ACTIVITIES OF DAILY LIVING (ADL)
ADLS_ACUITY_SCORE: 66
ADLS_ACUITY_SCORE: 66
DRESS: INDEPENDENT
LAUNDRY: WITH SUPERVISION
ADLS_ACUITY_SCORE: 66
ADLS_ACUITY_SCORE: 66
ORAL_HYGIENE: INDEPENDENT
HYGIENE/GROOMING: INDEPENDENT
HYGIENE/GROOMING: INDEPENDENT
ORAL_HYGIENE: INDEPENDENT
ADLS_ACUITY_SCORE: 66
DRESS: STREET CLOTHES;INDEPENDENT
ADLS_ACUITY_SCORE: 66

## 2025-02-11 NOTE — PLAN OF CARE
Problem: Anxiety  Goal: Anxiety Reduction or Resolution  Outcome: Progressing  Intervention: Promote Anxiety Reduction  Recent Flowsheet Documentation  Taken 2/11/2025 1616 by Tamie Fraser RN  Supportive Measures: active listening utilized     Problem: Psychotic Signs/Symptoms  Goal: Improved Behavioral Control (Psychotic Signs/Symptoms)  Outcome: Progressing  Intervention: Manage Behavior  Recent Flowsheet Documentation  Taken 2/11/2025 1616 by Tamie Fraser RN  De-Escalation Techniques:   appropriate behavior reinforced   verbally redirected     Problem: Sleep Disturbance  Goal: Adequate Sleep/Rest  Outcome: Progressing   Pt was visible in the lounge watching TV with select peers. Presented with flat affect. Pt was irritated and angry upon assessments. Mood calm and cooperative. Food and fluid intake adequate. Pt was cooperative and compliant with medications. No behavioral concerns.

## 2025-02-11 NOTE — PLAN OF CARE
BEH IP Unit Acuity Rating Score (UARS)  Patient is given one point for every criteria they meet.    CRITERIA SCORING   On a 72 hour hold, court hold, committed, stay of commitment, or revocation. 1    Patient LOS on BEH unit exceeds 20 days. 1  LOS: 122   Patient under guardianship, 55+, otherwise medically complex, or under age 11. 1   Suicide ideation without relief of precipitating factors. 0   Current plan for suicide. 0   Current plan for homicide. 0   Imminent risk or actual attempt to seriously harm another without relief of factors precipitating the attempt. 1   Severe dysfunction in daily living (ex: complete neglect for self care, extreme disruption in vegetative function, extreme deterioration in social interactions). 1   Recent (last 7 days) or current physical aggression in the ED or on unit. 0   Restraints or seclusion episode in past 72 hours. 0   Recent (last 7 days) or current verbal aggression, agitation, yelling, etc., while in the ED or unit. 0   Active psychosis. 1   Need for constant or near constant redirection (from leaving, from others, etc).  0   Intrusive or disruptive behaviors. 0   Patient requires 3 or more hours of individualized nursing care per 8-hour shift (i.e. for ADLs, meds, therapeutic interventions). 0   TOTAL 6

## 2025-02-11 NOTE — PLAN OF CARE
"  Problem: Adult Behavioral Health Plan of Care  Goal: Patient-Specific Goal (Individualization)  Description: You can add care plan individualizations to a care plan. Examples of Individualization might be:  \"Parent requests to be called daily at 9am for status\", \"I have a hard time hearing out of my right ear\", or \"Do not touch me to wake me up as it startles  me\".  Outcome: Progressing  Goal: Adheres to Safety Considerations for Self and Others  Outcome: Progressing  Goal: Absence of New-Onset Illness or Injury  Outcome: Progressing     Problem: Psychotic Signs/Symptoms  Goal: Improved Behavioral Control (Psychotic Signs/Symptoms)  Outcome: Progressing     Problem: Depression  Goal: Improved Mood  Outcome: Progressing     Problem: Suicidal Behavior  Goal: Suicidal Behavior is Absent or Managed  Outcome: Progressing     Problem: Sleep Disturbance  Goal: Adequate Sleep/Rest  Outcome: Progressing     Problem: Anxiety  Goal: Anxiety Reduction or Resolution  Outcome: Progressing   Goal Outcome Evaluation:    Pt approached the med window in the morning asking for his properties. Pt was redirected and totally forgot about his request later when approached by RN. Pt is cooperative with his medications. He is more interactive with staff this shift. He also engages  with peers. Interactions are appropriate. Pt denied all mental health symptoms. He was talking about some\" bidding for a job/project\" that he has to do for today. Pt talked with someone on the phone. Soon after , his mood improved. Pt has intermittent confusion. Overall, he is pleasant and cooperative.     "

## 2025-02-11 NOTE — PLAN OF CARE
Team Note Due:  Monday    Assessment/Intervention/Current Symtoms and Care Coordination:  Chart review and met with team, discussed pt progress, symptomology, and response to treatment.  Discussed the discharge plan and any potential impediments to discharge. Clifford Aaron is emergency guardian/conservator until 02/27/2025. A petition for permanent guardianship/conservatorship has been filed and a hearing is scheduled for 2/27/25.    MD completed Plan of Care forms, returned to [facility].     Discharge Plan or Goal:  Memory care facility     Barriers to Discharge:  Patient requires further psychiatric stabilization due to current symptomology, medication management with changes subject to provider, coordination with outside supports, and aftercare planning. Pt is under civil commitment.     Referral Status:  Memory Care approved:  [facility]. Tour completed 11/30. Per Clifford on 1/8, she would like to move forward with a referral. Tentative move in date is 2/24/25.  Vera (Exec Director): [phone]      Memory Care facilities currently in process:  [facility]. Tour completed 11/27.  [facility]. Tour scheduled 1/8/25. Have immediate openings and will accept EW. Family not requesting referral yet.  [facility]. Per Clifford on 1/20, she would like to move forward with a referral. Records sent 1/20.  [email or web address]    Memory Care facilities pending family review:  [facility].  [facility].  [facility].  [facility].  [facility].    Memory Care facilities declined:  [facility] (private pay only, no EW).  [facility] [place] [address] (private pay only, no EW).  [facility] [place] (no openings).  [facility]. Tour completed 11/29. Records sent 12/2/24. Declined 12/19/24 (don't feel they are  an appropriate facility for pt's needs).  Lorraine (): [email or web address]; [phone]  Isatu (director of nursing): [email or web address]  [facility].  Denied 12/26 (concerned about dementia with psychosis, history of hallucinations, high elopement risk, still on 1:1).  Nikki Noel: [email or web address]; Office [phone], Fax [phone]   [facility]. Not accepting EW as of 1/7/25.    Legal Status:  MI Commitment with [facility]: [place]  File Number: [id number]  Start and expiration date of commitment: 10/24/24 - 04/24/25    [facility] meds: Haldol, Clozaril, Risperdal, Invega, Zyprexa, Seroquel, Abilify    PPS/CM:  Shelby Chowdary: [phone]  [email or web address].    Contacts:  Maria C Aaron (Daughter): [phone]   Clifford Aaron (ex-wife): [phone]     No Del Angel (guardianship/conservatorship ): [phone]  [email or web address]    Caro Ross ([facility] probate court visitor for guardianship): [phone]  [email or web address]    Derrell Duff ([employer] ): [phone]  [email or web address]     Upcoming Meetings and Dates/Important Information and next steps:  Schedule ambulance transport when discharge is confirmed  Send discharge summary to [facility]  Send PD/Discharge Summary to CM  Send PD/COS to [facility]    Provisional Discharge and Change of Status needed at discharge

## 2025-02-11 NOTE — PLAN OF CARE
Problem: Sleep Disturbance  Goal: Adequate Sleep/Rest  Outcome: Progressing    Pt appears to have slept for 5.75 hours.  He woke up intermittently and sat/slept in the lounge area. He refused redirection to promote sleep. Pt denies pain, anxiety, depression, and SI/SIB. No PRN medications were given. 15 minutes safety checks were in place. Staff will continue to offer support to pt.

## 2025-02-11 NOTE — PROGRESS NOTES
"  ----------------------------------------------------------------------------------------------------------  [facility]  Psychiatry Progress Note  Hospital Day #122     Interim History:     The patient's care was discussed with the treatment team and chart notes were reviewed.    Identifier: Estevan Aaron is a 64 year old male with previous psychiatric diagnoses of  generalized anxiety disorder, Neurocognitive disorder, admitted from the ED 10/12/2024 due to concern for HI and psychosis, which now have resolved, in the psychosocial stressors (recent divorce).     Sleep: 5.75 hours (02/11/25 0621)  Scheduled medications: Took all scheduled medications as prescribed  Psychiatric PRN medications:  none       Staff Report:   Pt appears to have slept for 5.75 hours.  He woke up intermittently and sat/slept in the lounge area. He refused redirection to promote sleep. Pt denies pain, anxiety, depression, and SI/SIB. No PRN medications were given. 15 minutes safety checks were in place. Staff will continue to offer support to pt.      Subjective:     Patient Interview:  Santos was interviewed in the milieu. He said he was \"going to  a van in a few days.\" He proceeded to talk about building sites and still perseverated over the glasses of another patient. He denies any pain and overall things are going well.     ROS:  Patient denies acute concerns apart from knee pain noted above.     Objective:     Vitals:  /81 (BP Location: Right arm, Patient Position: Sitting, Cuff Size: Adult Large)   Pulse 71   Temp 98.4  F (36.9  C) (Oral)   Resp 18   Wt 114 kg (251 lb 4.8 oz)   SpO2 96%   BMI 40.56 kg/m      Allergies:  No Known Allergies    Current Medications:  Scheduled:  Current Facility-Administered Medications   Medication Dose Route Frequency Provider Last Rate Last Admin    acetaminophen (TYLENOL) tablet 650 mg  650 mg Oral Q4H PRN Nikki Caceres MD   650 mg " at 01/09/25 0228    alum & mag hydroxide-simethicone (MAALOX) suspension 30 mL  30 mL Oral Q4H PRN Nikki Caceres MD   30 mL at 10/17/24 0837    amLODIPine (NORVASC) tablet 10 mg  10 mg Oral Daily Presley Barrera MD   10 mg at 02/11/25 0802    atorvastatin (LIPITOR) tablet 40 mg  40 mg Oral Daily Nikki Caceres MD   40 mg at 02/11/25 0803    cholecalciferol (VITAMIN D3) capsule 1,250 mcg  1,250 mcg Oral Q7 Days Evelia Grimm DO   1,250 mcg at 02/04/25 1256    cinacalcet (SENSIPAR) tablet 30 mg  30 mg Oral Daily Presley Barrera MD   30 mg at 02/11/25 0803    cloNIDine (CATAPRES) tablet 0.1 mg  0.1 mg Oral BID Presley Barrera MD   0.1 mg at 02/11/25 0803    diclofenac (VOLTAREN) 1 % topical gel 2 g  2 g Topical 4x Daily PRN Rocío Lopez MD   2 g at 12/22/24 2032    furosemide (LASIX) tablet 40 mg  40 mg Oral Daily Presley Barrera MD   40 mg at 02/11/25 0803    gabapentin (NEURONTIN) capsule 100 mg  100 mg Oral Q6H PRN Nikki Caceres MD   100 mg at 12/24/24 0046    hydrocortisone (CORTAID) 0.5 % cream   Topical Daily PRN Savi Chamorro MD        Lidocaine (LIDOCARE) 4 % Patch 1 patch  1 patch Transdermal Q24H PRN Rocío Lopez MD   1 patch at 12/22/24 2023    lisinopril (ZESTRIL) tablet 40 mg  40 mg Oral Daily Presley Barrera MD   40 mg at 02/11/25 0802    melatonin tablet 3 mg  3 mg Oral At Bedtime Presley Barrera MD   3 mg at 02/10/25 2029    metoprolol succinate ER (TOPROL XL) 24 hr tablet 50 mg  50 mg Oral Daily Presley Barrera MD   50 mg at 02/11/25 0803    nicotine (NICODERM CQ) 14 MG/24HR 24 hr patch 1 patch  1 patch Transdermal Daily Evelia Grimm DO   1 patch at 02/11/25 0811    nicotine (NICORETTE) gum 2 mg  2 mg Buccal Q1H PRN González Garcia MD   2 mg at 10/24/24 1254    OLANZapine (zyPREXA) tablet 5 mg  5 mg Oral TID PRN Evelia Grimm DO   5 mg at 01/05/25 1645    Or    OLANZapine (zyPREXA) injection 5 mg  5 mg Intramuscular TID PRN Sirisha  DO Evelia        polyethylene glycol (MIRALAX) Packet 17 g  17 g Oral Daily PRN Nikki Caceres MD        senna-docusate (SENOKOT-S/PERICOLACE) 8.6-50 MG per tablet 1 tablet  1 tablet Oral Daily Evelia Grimm DO   1 tablet at 02/11/25 0803    traZODone (DESYREL) half-tab 25 mg  25 mg Oral At Bedtime Rocío Lopez MD   25 mg at 02/10/25 2031    traZODone (DESYREL) half-tab 25 mg  25 mg Oral At Bedtime PRN Evelia Grimm DO   25 mg at 12/24/24 0046       PRN:  Current Facility-Administered Medications   Medication Dose Route Frequency Provider Last Rate Last Admin    acetaminophen (TYLENOL) tablet 650 mg  650 mg Oral Q4H PRN Nikki Caceres MD   650 mg at 01/09/25 0228    alum & mag hydroxide-simethicone (MAALOX) suspension 30 mL  30 mL Oral Q4H PRN Nikki Caceres MD   30 mL at 10/17/24 0837    amLODIPine (NORVASC) tablet 10 mg  10 mg Oral Daily Presley Barrera MD   10 mg at 02/11/25 0802    atorvastatin (LIPITOR) tablet 40 mg  40 mg Oral Daily Nikki Caceres MD   40 mg at 02/11/25 0803    cholecalciferol (VITAMIN D3) capsule 1,250 mcg  1,250 mcg Oral Q7 Days Evelia Grimm DO   1,250 mcg at 02/04/25 1256    cinacalcet (SENSIPAR) tablet 30 mg  30 mg Oral Daily Presley Barrera MD   30 mg at 02/11/25 0803    cloNIDine (CATAPRES) tablet 0.1 mg  0.1 mg Oral BID Presley Barrera MD   0.1 mg at 02/11/25 0803    diclofenac (VOLTAREN) 1 % topical gel 2 g  2 g Topical 4x Daily PRN Rocío Lopez MD   2 g at 12/22/24 2032    furosemide (LASIX) tablet 40 mg  40 mg Oral Daily Presley Barrera MD   40 mg at 02/11/25 0803    gabapentin (NEURONTIN) capsule 100 mg  100 mg Oral Q6H PRN Nikki Caceres MD   100 mg at 12/24/24 0046    hydrocortisone (CORTAID) 0.5 % cream   Topical Daily PRN Savi Chamorro MD        Lidocaine (LIDOCARE) 4 % Patch 1 patch  1 patch Transdermal Q24H PRN Rocío Lopez MD   1 patch at 12/22/24 2023    lisinopril (ZESTRIL)  tablet 40 mg  40 mg Oral Daily Presley Barrera MD   40 mg at 02/11/25 0802    melatonin tablet 3 mg  3 mg Oral At Bedtime Presley Barrera MD   3 mg at 02/10/25 2029    metoprolol succinate ER (TOPROL XL) 24 hr tablet 50 mg  50 mg Oral Daily Presley Barrera MD   50 mg at 02/11/25 0803    nicotine (NICODERM CQ) 14 MG/24HR 24 hr patch 1 patch  1 patch Transdermal Daily Evelia Grimm DO   1 patch at 02/11/25 0811    nicotine (NICORETTE) gum 2 mg  2 mg Buccal Q1H PRN González Garcia MD   2 mg at 10/24/24 1254    OLANZapine (zyPREXA) tablet 5 mg  5 mg Oral TID PRN Evelia Grimm DO   5 mg at 01/05/25 1645    Or    OLANZapine (zyPREXA) injection 5 mg  5 mg Intramuscular TID PRN Evelia Grimm DO        polyethylene glycol (MIRALAX) Packet 17 g  17 g Oral Daily PRN Nikki Caceres MD        senna-docusate (SENOKOT-S/PERICOLACE) 8.6-50 MG per tablet 1 tablet  1 tablet Oral Daily Evelia Grimm DO   1 tablet at 02/11/25 0803    traZODone (DESYREL) half-tab 25 mg  25 mg Oral At Bedtime Rocío Lopez MD   25 mg at 02/10/25 2031    traZODone (DESYREL) half-tab 25 mg  25 mg Oral At Bedtime PRN Evelia Grimm DO   25 mg at 12/24/24 0046     Labs and Imaging:  Data this admission:  - CBC unremarkable  - CMP unremarkable  - TSH normal  - UDS negative  - Vit D low  - Hgb A1c 5.9 (10/13/24)  - Lipids unremarkable  - Vit B12 normal  - Folate normal  - Urinalysis unremarkable  - EKG normal sinus rhythm, QTc 390 ms  - Head CT showed no acute changes  - HIV non reactive  - Treponema antibody non reactive  - ESR wnl   - Ceruloplasmin wnl   - BOOGIE negative  - Lyme negative      Parathyroid hormone:   85 (10/13)     Calcium:  11.4 (10/14) -> 10.3 (10/16) -> 9.8 (10/19) -> 10.6 (10/21) -> 10.3 (10/23)     Albumin:  4.3 (10/14) -->  4.3 (10/16) -> 4.1 (10/17) -> 4.2 (10/19) -> 4.5 (10/21) -> 4.3 (10/23)      Mental Status Exam:     Oriented to:  Person/Self  General:  Alert, eating in the lounge  Appearance:  Appears stated age,  "dressed in own clothing, overweight, grooming adequate with reported shower today  Behavior/Attitude: Cooperative, irritable   Eye Contact: Appropriate  Psychomotor: Normal and No evidence of tics, dystonia, or tardive dyskinesia  no catatonia present  Speech:  loud volume/tone  Language: Fluent in English with appropriate syntax and vocabulary.  Mood:  \"Fine\"  Affect:   appropriate and congruent with mood  Thought Process:   concrete, disorganized, confused  Thought Content:   No SI/HI/AH/VH;  No apparent delusions today  Associations:  loose  Insight:  impaired due to neurocognitive disorder   Judgment:  impaired due to neurocognitive disorder  Impulse control: impaired  Attention Span:  inattentive, limited  Concentration:   impaired by neurocognitive disorder  Recent and Remote Memory:   remote memory intact, recent memory impaired  Fund of Knowledge: average  Muscle Strength and Tone: normal  Gait and Station: Normal      Psychiatric Assessment     Estevan Aaron is a 64 year old male with previous psychiatric diagnoses of GEORGINA admitted from the ER on 10/12/2024 due to concern for HI and psychosis in the context of medical issues (hyperparathyroidism, hypercalcemia), psychosocial stressors including with recent divorce and selling his home. This is the patient's first psychiatric hospitalization. The MSE on admission was pertinent for a thought process which was perseverative, circumstantial, tangential, disorganized and tangential; with rambling and looseness of associations. Psychological contributions to presentation included lack of insight. Social factors contributing to presentation included isolation, recent divorce, selling his house, and moving from hotel to hotel. Biological contributions to presentation included a history of hyperparathyroidism with chronic hypercalcemia per charts as well as a history of methamphetamine use per collateral from ex-wife.     Per collateral from ex-wife, Santos has had " paranoid ideations since they first met. He has always felt like people are out to get him or trying to rip him off. She says that he has had visual hallucinations (he will point things out that the rest of the family can't see) for a long time, but the family would just go along with him to avoid making him angry. This timeline and presentation could be consistent with diagnosis of paranoid personality disorder. Things began to worsen around the beginning of the COVID pandemic, when Santos became more isolated and the family started to notice cognitive issues such as impaired memory. Other things that could be contributing to his presentation is hyperparathyroidism with hypercalcemia. However, patient's calcium returned to normal limits on 10/16, and patient continues to have disorganized behavior unrelated to calcium elevation, so likely this is not a significant contributing factor to patient's presentation.      Currently, Santos is at times disoriented, but easily re-directable. Presents with some difficulty of word articulation, which contributes to his frustration when interacting with psych team, as he finds it difficult to explain himself. He occasionally has struggled to attend to ADLS particularly bathing and required frequent encouragement, likely due to underlying disorganized behavior. Overall, he is mostly independent in the unit. His confused behavior tends to increase in the evenings, seemingly related to a Neurocognitive Disorder diagnosis, and this could evolve to be his new baseline. He does not present as a danger to himself or others so his SIO was discontinued. Santos does not present with any new episodes of physical agitation and is able to attend groups and interact with peers without major altercations. Patient currently is stable with current neuroleptic doses in regards to improved paranoia. However, neuroleptics could be worsening his brain fog and may benefit from a down titration as a trial  while awaiting placement. He is doing well with a reduction of Zyprexa and the plan is to discontinue it week of 2/10 before expected discharge on 2/24. Due to inability to care for self outside a supportive sxs, would benefit from continued hospital stay with discharge to group home/nursing home when available.          Psychiatric Plan by Diagnosis     Today's changes:  - no changes     # Major Neurocognitive Disorder  1. Medications:  - Trazodone 25 mg HS  - Zyprexa 5 mg TID PRN      3. Additional Plans:  - Patient will be treated in therapeutic milieu with appropriate individual and group therapies as described  - Pending memory facility placement.      # Unspecified anxiety vs Generalized Anxiety Disorder  - Monitor for symptoms.  - Fluoxetine held due to suspicion of ongoing manic symptoms     Psychiatric Hospital Course:      Estevan Aaron was admitted to [facility] on a 72 hour hold.   Medications:  PTA fluoxetine was held due to concern for worsening of zelalem   New medications started at the time of admission include Zyprexa.   Increased olanzapine 10 mg at bedtime was to 10 mg BID (10/14)   Increased olanzapine 10 mg BID to 10 mg during day and 15 mg at bedtime (10/15)  10/21: increased olanzapine pm dose from 15 to 20 mg  10/23: started melatonin 3 mg at 7 pm to help patient with circadian rhythm as he has been staying up throughout the night and sleeping a lot during the day and there is some concern for delirium   10/24: consulted anesthesia for MRI brain   10/28: MRI brain complete, decrease AM olanzapine from 10 to 5 mg  10/29: Morning olanzapine decreased to 2.5 mg, evening olanzapine decreased to 15 mg   11/1: Decreased evening olanzapine to 10 mg   11/4: Decreased evening olanzapine to 7.5 mg   11/5: Decreased evening olanzapine to 5 mg   11/11: Moved morning dose of olanzapine to the afternoon as patient appears more agitated in the afternoons/evenings  11/21: Started memantine 5 mg daily   11/26:  Discontinued olanzapine 2.5 mg during the afternoon  12/2:   Discontinued memantine 5 mg, daily  2/5: as psychosis has improved with less paranoia, it was thought patient could benefit from a decrease in Zyprexa as it could be worsening is cognitive sxs and given black box warning for his age category. Decreased Zyprexa to 2.5mg at bedtime as a trial. Can restart if psychosis worsens. Seems to be tolerating this well.   2/10: Discontinued Zyprexa    The risks, benefits, alternatives, and side effects were discussed and understood by the patient and other caregivers.     Medical Assessment and Plan     Medical diagnoses to be addressed this admission:    # Hip Pain  - New right hip pain, and mild pain in multiple joints. Moderate response to topical antiinflammatory gel and lidocaine patch.   - Medicine consulted for recommendations 12/20    # CHF  # Hypertension  - Continue PTA medications  Furosemide 40 mg daily  Lisinopril 40 mg daily  Metoprolol 50 mg daily  Amlodipine 10 mg daily  Clonidine 0.1 mg BID    # Hyperlipidemia  - Continue PTA Atorvastatin 40 mg     # Primary Hyperparathyroidism  # Hypercalcemia, hypophosphoremia   Increased Ca level to 10.9 and decreased Ph level to 2.3 in the ED   - Consulted endocrinology, who started cinacalcet 30 mg BID on 10/13  - 10/16 endocrinology recommended continuing to trend calcium and albumin to make sure patient does not become hypocalcemic and recommended holding cinacalcet   - 10/17, calcium and albumin wnl, endo recommended cinacalcet 30 mg once daily   - 10/21, per endo continue cincaclcet and recheck calcium and albumin on October 24  - 10/23: per endo, patient needs to follow up outpatient for further management of hyperparathyroidism     # Rhinorrhea  1/15 currently multiple COVID infections on unit. No other symptoms of COVID noted. COVID PCR - on 1/13.    #Chipped tooth: Pt chipped a molar at dinner on 2/4, denied pain. No bleeding.  - would benefit from outpt  dental follow up.     Medical course: Patient was physically examined by the ED prior to being transferred to the unit and was found to be medically stable and appropriate for admission.      Consults: Psychiatry, Endocrinology (follow-up of hyperparathyroidism / hypercalcemia and hypertension), neurology     Checklist     Legal Status: Committed   [place] meds: Haldol, Clozaril, Risperdal, Invega, Zyprexa, Seroquel, Abilify    Safety Assessment:   Behavioral Orders   Procedures    Code 1 - Restrict to Unit    Routine Programming     As clinically indicated    Status 15     Every 15 minutes.    Status Individual Observation     Patient SIO status reviewed with team/RN.  Please also refer to RN/team documentation for add'l detail.    -SIO staff to monitor following which have contributed to patient being on SIO:  Patient intrusiveness to other patients  -Possible interventions SIO staff could use to support patient's treatment progress:  Redirection  -When following observed, team will review discontinuation of SIO:  Improvement in intrusive behavior.     Order Specific Question:   CONTINUOUS 24 hours / day     Answer:   Other     Order Specific Question:   Specify distance     Answer:   10 feet     Order Specific Question:   Indications for SIO     Answer:   Severe intrusiveness       Risk Assessment:  Risk for harm is low  Risk factors: impulsive and past behaviors  Protective factors: family      SIO: No    Disposition: Pending transfer to memory care facility.  Accepted to [facility] for 2/24.      Attestations   Resident/Fellow Attestation   I, Sherrie Cosby MD, was present with the medical/ALLEN student who participated in the service and in the documentation of the note.  I have verified the history and personally performed the physical exam and medical decision making.  I agree with the assessment and plan of care as documented in the note.        Sherrie Cosby MD  PGY1  Date of Service (when I saw the patient):  02/11/25     Attestation:  This patient has been seen and evaluated by me, Pete Smith MD.  I have discussed this patient with the house staff team including the resident and/or medical student and I agree with the findings and plan in this note.    I have reviewed today's vital signs, medications, labs and imaging. Pete Smith MD , PhD.

## 2025-02-12 PROCEDURE — 250N000013 HC RX MED GY IP 250 OP 250 PS 637

## 2025-02-12 PROCEDURE — 124N000002 HC R&B MH UMMC

## 2025-02-12 PROCEDURE — 99232 SBSQ HOSP IP/OBS MODERATE 35: CPT | Mod: GC | Performed by: PSYCHIATRY & NEUROLOGY

## 2025-02-12 RX ADMIN — Medication 1 PATCH: at 08:02

## 2025-02-12 RX ADMIN — FUROSEMIDE 40 MG: 40 TABLET ORAL at 07:58

## 2025-02-12 RX ADMIN — Medication 25 MG: at 20:36

## 2025-02-12 RX ADMIN — ACETAMINOPHEN 650 MG: 325 TABLET, FILM COATED ORAL at 20:36

## 2025-02-12 RX ADMIN — ATORVASTATIN CALCIUM 40 MG: 20 TABLET, FILM COATED ORAL at 07:58

## 2025-02-12 RX ADMIN — SENNOSIDES AND DOCUSATE SODIUM 1 TABLET: 50; 8.6 TABLET ORAL at 07:58

## 2025-02-12 RX ADMIN — AMLODIPINE BESYLATE 10 MG: 5 TABLET ORAL at 07:59

## 2025-02-12 RX ADMIN — METOPROLOL SUCCINATE 50 MG: 50 TABLET, EXTENDED RELEASE ORAL at 07:58

## 2025-02-12 RX ADMIN — MELATONIN TAB 3 MG 3 MG: 3 TAB at 20:35

## 2025-02-12 RX ADMIN — CLONIDINE HYDROCHLORIDE 0.1 MG: 0.1 TABLET ORAL at 20:36

## 2025-02-12 RX ADMIN — CLONIDINE HYDROCHLORIDE 0.1 MG: 0.1 TABLET ORAL at 07:58

## 2025-02-12 RX ADMIN — LISINOPRIL 40 MG: 10 TABLET ORAL at 07:58

## 2025-02-12 RX ADMIN — CINACALCET 30 MG: 30 TABLET ORAL at 07:58

## 2025-02-12 ASSESSMENT — ACTIVITIES OF DAILY LIVING (ADL)
ADLS_ACUITY_SCORE: 66
HYGIENE/GROOMING: INDEPENDENT
ADLS_ACUITY_SCORE: 66
ADLS_ACUITY_SCORE: 66
DRESS: STREET CLOTHES;INDEPENDENT
ADLS_ACUITY_SCORE: 66
LAUNDRY: WITH SUPERVISION
ADLS_ACUITY_SCORE: 66
ORAL_HYGIENE: INDEPENDENT
ADLS_ACUITY_SCORE: 66

## 2025-02-12 NOTE — PLAN OF CARE
BEH IP Unit Acuity Rating Score (UARS)  Patient is given one point for every criteria they meet.    CRITERIA SCORING   On a 72 hour hold, court hold, committed, stay of commitment, or revocation. 1    Patient LOS on BEH unit exceeds 20 days. 1  LOS: 123   Patient under guardianship, 55+, otherwise medically complex, or under age 11. 1   Suicide ideation without relief of precipitating factors. 0   Current plan for suicide. 0   Current plan for homicide. 0   Imminent risk or actual attempt to seriously harm another without relief of factors precipitating the attempt. 1   Severe dysfunction in daily living (ex: complete neglect for self care, extreme disruption in vegetative function, extreme deterioration in social interactions). 1   Recent (last 7 days) or current physical aggression in the ED or on unit. 0   Restraints or seclusion episode in past 72 hours. 0   Recent (last 7 days) or current verbal aggression, agitation, yelling, etc., while in the ED or unit. 0   Active psychosis. 1   Need for constant or near constant redirection (from leaving, from others, etc).  0   Intrusive or disruptive behaviors. 0   Patient requires 3 or more hours of individualized nursing care per 8-hour shift (i.e. for ADLs, meds, therapeutic interventions). 0   TOTAL 6

## 2025-02-12 NOTE — PROGRESS NOTES
----------------------------------------------------------------------------------------------------------  [facility]  Psychiatry Progress Note  Hospital Day #123     Interim History:     The patient's care was discussed with the treatment team and chart notes were reviewed.    Identifier: Estevan Aaron is a 64 year old male with previous psychiatric diagnoses of  generalized anxiety disorder, Neurocognitive disorder, admitted from the ED 10/12/2024 due to concern for HI and psychosis, which now have resolved, in the psychosocial stressors (recent divorce).     Sleep: 5.5 hours (02/12/25 0715)  Scheduled medications: Took all scheduled medications as prescribed  Psychiatric PRN medications:  none       Staff Report:   Patient had an uneventful night. No behavioral issues. No complaints of pain. No requested or administered prn's. Respirations regular and non-labored. Routine safety checks in place q 15 minutes. Appears to have slept for 5.5 hours.      Subjective:     Patient Interview:  Santos was interviewed in his room, sleeping. Disappointed on discharge. Feels well off meds. [place] they were not helping. Apologized for his language.     ROS:  Patient denies acute concerns apart from knee pain noted above.     Objective:     Vitals:  /79   Pulse 60   Temp 97.5  F (36.4  C)   Resp 18   Wt 114 kg (251 lb 4.8 oz)   SpO2 96%   BMI 40.56 kg/m      Allergies:  No Known Allergies    Current Medications:  Scheduled:  Current Facility-Administered Medications   Medication Dose Route Frequency Provider Last Rate Last Admin    acetaminophen (TYLENOL) tablet 650 mg  650 mg Oral Q4H PRN Nikki Caceres MD   650 mg at 01/09/25 0228    alum & mag hydroxide-simethicone (MAALOX) suspension 30 mL  30 mL Oral Q4H PRN Nikki Caceres MD   30 mL at 10/17/24 0837    amLODIPine (NORVASC) tablet 10 mg  10 mg Oral Daily Presley Barrera MD   10 mg at 02/12/25 0753     atorvastatin (LIPITOR) tablet 40 mg  40 mg Oral Daily Nikki Caceres MD   40 mg at 02/12/25 0758    cholecalciferol (VITAMIN D3) capsule 1,250 mcg  1,250 mcg Oral Q7 Days Evelia Grimm DO   1,250 mcg at 02/11/25 1127    cinacalcet (SENSIPAR) tablet 30 mg  30 mg Oral Daily Presley Barrera MD   30 mg at 02/12/25 0758    cloNIDine (CATAPRES) tablet 0.1 mg  0.1 mg Oral BID Presley Barrera MD   0.1 mg at 02/12/25 0758    diclofenac (VOLTAREN) 1 % topical gel 2 g  2 g Topical 4x Daily PRN Rocío Lopez MD   2 g at 12/22/24 2032    furosemide (LASIX) tablet 40 mg  40 mg Oral Daily Presley Barrera MD   40 mg at 02/12/25 0758    gabapentin (NEURONTIN) capsule 100 mg  100 mg Oral Q6H PRN Nikki Caceres MD   100 mg at 12/24/24 0046    hydrocortisone (CORTAID) 0.5 % cream   Topical Daily PRN Savi Chamorro MD        Lidocaine (LIDOCARE) 4 % Patch 1 patch  1 patch Transdermal Q24H PRN Rocío Lopez MD   1 patch at 12/22/24 2023    lisinopril (ZESTRIL) tablet 40 mg  40 mg Oral Daily Presley Barrera MD   40 mg at 02/12/25 0758    melatonin tablet 3 mg  3 mg Oral At Bedtime Presley Barrera MD   3 mg at 02/11/25 2127    metoprolol succinate ER (TOPROL XL) 24 hr tablet 50 mg  50 mg Oral Daily Presley Barrera MD   50 mg at 02/12/25 0758    nicotine (NICODERM CQ) 14 MG/24HR 24 hr patch 1 patch  1 patch Transdermal Daily Evelia Grimm DO   1 patch at 02/12/25 0802    nicotine (NICORETTE) gum 2 mg  2 mg Buccal Q1H PRN González Garcia MD   2 mg at 10/24/24 1254    OLANZapine (zyPREXA) tablet 5 mg  5 mg Oral TID PRN Evelia Grimm DO   5 mg at 01/05/25 1645    Or    OLANZapine (zyPREXA) injection 5 mg  5 mg Intramuscular TID PRN Evelia Grimm DO        polyethylene glycol (MIRALAX) Packet 17 g  17 g Oral Daily PRN Nikki Caceres MD        senna-docusate (SENOKOT-S/PERICOLACE) 8.6-50 MG per tablet 1 tablet  1 tablet Oral Daily Evelia Grimm DO   1 tablet at 02/12/25 0758    traZODone  (DESYREL) half-tab 25 mg  25 mg Oral At Bedtime Rocío Lopez MD   25 mg at 02/11/25 2129    traZODone (DESYREL) half-tab 25 mg  25 mg Oral At Bedtime PRN Evelia Grimm DO   25 mg at 12/24/24 0046       PRN:  Current Facility-Administered Medications   Medication Dose Route Frequency Provider Last Rate Last Admin    acetaminophen (TYLENOL) tablet 650 mg  650 mg Oral Q4H PRN Nikki Caceres MD   650 mg at 01/09/25 0228    alum & mag hydroxide-simethicone (MAALOX) suspension 30 mL  30 mL Oral Q4H PRN Nikki Caceres MD   30 mL at 10/17/24 0837    amLODIPine (NORVASC) tablet 10 mg  10 mg Oral Daily Presley Barrera MD   10 mg at 02/12/25 0759    atorvastatin (LIPITOR) tablet 40 mg  40 mg Oral Daily Nikki Caceres MD   40 mg at 02/12/25 0758    cholecalciferol (VITAMIN D3) capsule 1,250 mcg  1,250 mcg Oral Q7 Days Evelia Grimm DO   1,250 mcg at 02/11/25 1127    cinacalcet (SENSIPAR) tablet 30 mg  30 mg Oral Daily Presley Barrera MD   30 mg at 02/12/25 0758    cloNIDine (CATAPRES) tablet 0.1 mg  0.1 mg Oral BID Presley Barrera MD   0.1 mg at 02/12/25 0758    diclofenac (VOLTAREN) 1 % topical gel 2 g  2 g Topical 4x Daily PRN Rocío Lopez MD   2 g at 12/22/24 2032    furosemide (LASIX) tablet 40 mg  40 mg Oral Daily Presley Barrera MD   40 mg at 02/12/25 0758    gabapentin (NEURONTIN) capsule 100 mg  100 mg Oral Q6H PRN Nikki Caceres MD   100 mg at 12/24/24 0046    hydrocortisone (CORTAID) 0.5 % cream   Topical Daily PRN Savi Chamorro MD        Lidocaine (LIDOCARE) 4 % Patch 1 patch  1 patch Transdermal Q24H PRN Rocío Lopez MD   1 patch at 12/22/24 2023    lisinopril (ZESTRIL) tablet 40 mg  40 mg Oral Daily Presley Barrera MD   40 mg at 02/12/25 0758    melatonin tablet 3 mg  3 mg Oral At Bedtime Presley Barrera MD   3 mg at 02/11/25 2127    metoprolol succinate ER (TOPROL XL) 24 hr tablet 50 mg  50 mg Oral Daily Presley Barrera MD    50 mg at 02/12/25 0758    nicotine (NICODERM CQ) 14 MG/24HR 24 hr patch 1 patch  1 patch Transdermal Daily Evelia Grimm DO   1 patch at 02/12/25 0802    nicotine (NICORETTE) gum 2 mg  2 mg Buccal Q1H PRN González Garcia MD   2 mg at 10/24/24 1254    OLANZapine (zyPREXA) tablet 5 mg  5 mg Oral TID PRN Evelia Grimm DO   5 mg at 01/05/25 1645    Or    OLANZapine (zyPREXA) injection 5 mg  5 mg Intramuscular TID PRN Evelia Grimm DO        polyethylene glycol (MIRALAX) Packet 17 g  17 g Oral Daily PRN Nikki Caceres MD        senna-docusate (SENOKOT-S/PERICOLACE) 8.6-50 MG per tablet 1 tablet  1 tablet Oral Daily Evelia Grimm DO   1 tablet at 02/12/25 0758    traZODone (DESYREL) half-tab 25 mg  25 mg Oral At Bedtime Rocío Lopez MD   25 mg at 02/11/25 2129    traZODone (DESYREL) half-tab 25 mg  25 mg Oral At Bedtime PRN Evelia Grimm DO   25 mg at 12/24/24 0046     Labs and Imaging:  Data this admission:  - CBC unremarkable  - CMP unremarkable  - TSH normal  - UDS negative  - Vit D low  - Hgb A1c 5.9 (10/13/24)  - Lipids unremarkable  - Vit B12 normal  - Folate normal  - Urinalysis unremarkable  - EKG normal sinus rhythm, QTc 390 ms  - Head CT showed no acute changes  - HIV non reactive  - Treponema antibody non reactive  - ESR wnl   - Ceruloplasmin wnl   - BOOGIE negative  - Lyme negative      Parathyroid hormone:   85 (10/13)     Calcium:  11.4 (10/14) -> 10.3 (10/16) -> 9.8 (10/19) -> 10.6 (10/21) -> 10.3 (10/23)     Albumin:  4.3 (10/14) -->  4.3 (10/16) -> 4.1 (10/17) -> 4.2 (10/19) -> 4.5 (10/21) -> 4.3 (10/23)      Mental Status Exam:     Oriented to:  Person/Self  General:  Alert, eating in the lounge  Appearance:  Appears stated age, dressed in own clothing, overweight, grooming adequate with reported shower today  Behavior/Attitude: Cooperative, irritable   Eye Contact: Appropriate  Psychomotor: Normal and No evidence of tics, dystonia, or tardive dyskinesia  no catatonia present  Speech:  " loud volume/tone  Language: Fluent in English with appropriate syntax and vocabulary.  Mood:  \"Fine\"  Affect:   appropriate and congruent with mood  Thought Process:   concrete, disorganized, confused  Thought Content:   No SI/HI/AH/VH;  No apparent delusions today  Associations:  loose  Insight:  impaired due to neurocognitive disorder   Judgment:  impaired due to neurocognitive disorder  Impulse control: impaired  Attention Span:  inattentive, limited  Concentration:   impaired by neurocognitive disorder  Recent and Remote Memory:   remote memory intact, recent memory impaired  Fund of Knowledge: average  Muscle Strength and Tone: normal  Gait and Station: Normal      Psychiatric Assessment     Estevan Aaron is a 64 year old male with previous psychiatric diagnoses of GEORGINA admitted from the ER on 10/12/2024 due to concern for HI and psychosis in the context of medical issues (hyperparathyroidism, hypercalcemia), psychosocial stressors including with recent divorce and selling his home. This is the patient's first psychiatric hospitalization. The MSE on admission was pertinent for a thought process which was perseverative, circumstantial, tangential, disorganized and tangential; with rambling and looseness of associations. Psychological contributions to presentation included lack of insight. Social factors contributing to presentation included isolation, recent divorce, selling his house, and moving from hotel to hotel. Biological contributions to presentation included a history of hyperparathyroidism with chronic hypercalcemia per charts as well as a history of methamphetamine use per collateral from ex-wife.     Per collateral from ex-wife, Santos has had paranoid ideations since they first met. He has always felt like people are out to get him or trying to rip him off. She says that he has had visual hallucinations (he will point things out that the rest of the family can't see) for a long time, but the family " would just go along with him to avoid making him angry. This timeline and presentation could be consistent with diagnosis of paranoid personality disorder. Things began to worsen around the beginning of the COVID pandemic, when Santos became more isolated and the family started to notice cognitive issues such as impaired memory. Other things that could be contributing to his presentation is hyperparathyroidism with hypercalcemia. However, patient's calcium returned to normal limits on 10/16, and patient continues to have disorganized behavior unrelated to calcium elevation, so likely this is not a significant contributing factor to patient's presentation.      Currently, Santos is at times disoriented, but easily re-directable. Presents with some difficulty of word articulation, which contributes to his frustration when interacting with psych team, as he finds it difficult to explain himself. He occasionally has struggled to attend to ADLS particularly bathing and required frequent encouragement, likely due to underlying disorganized behavior. Overall, he is mostly independent in the unit. His confused behavior tends to increase in the evenings, seemingly related to a Neurocognitive Disorder diagnosis, and this could evolve to be his new baseline. He does not present as a danger to himself or others so his SIO was discontinued. Santos does not present with any new episodes of physical agitation and is able to attend groups and interact with peers without major altercations. Patient currently is stable with current neuroleptic doses in regards to improved paranoia. However, neuroleptics could be worsening his brain fog and may benefit from a down titration as a trial while awaiting placement. He is doing well with a reduction of Zyprexa and the plan is to discontinue it week of 2/10 before expected discharge on 2/24. Due to inability to care for self outside a supportive sxs, would benefit from continued hospital stay with  discharge to group home/nursing home when available.          Psychiatric Plan by Diagnosis     Today's changes:  - no changes     # Major Neurocognitive Disorder  1. Medications:  - Trazodone 25 mg HS  - PRN Zyprexa 5 mg TID     3. Additional Plans:  - Patient will be treated in therapeutic milieu with appropriate individual and group therapies as described  - Pending memory facility placement.      # Unspecified anxiety vs Generalized Anxiety Disorder  - Monitor for symptoms.  - Fluoxetine held due to suspicion of ongoing manic symptoms     Psychiatric Hospital Course:      Estevan Aaron was admitted to [facility] on a 72 hour hold.   Medications:  PTA fluoxetine was held due to concern for worsening of zelalem   New medications started at the time of admission include Zyprexa.   Increased olanzapine 10 mg at bedtime was to 10 mg BID (10/14)   Increased olanzapine 10 mg BID to 10 mg during day and 15 mg at bedtime (10/15)  10/21: increased olanzapine pm dose from 15 to 20 mg  10/23: started melatonin 3 mg at 7 pm to help patient with circadian rhythm as he has been staying up throughout the night and sleeping a lot during the day and there is some concern for delirium   10/24: consulted anesthesia for MRI brain   10/28: MRI brain complete, decrease AM olanzapine from 10 to 5 mg  10/29: Morning olanzapine decreased to 2.5 mg, evening olanzapine decreased to 15 mg   11/1: Decreased evening olanzapine to 10 mg   11/4: Decreased evening olanzapine to 7.5 mg   11/5: Decreased evening olanzapine to 5 mg   11/11: Moved morning dose of olanzapine to the afternoon as patient appears more agitated in the afternoons/evenings  11/21: Started memantine 5 mg daily   11/26: Discontinued olanzapine 2.5 mg during the afternoon  12/2:   Discontinued memantine 5 mg, daily  2/5: as psychosis has improved with less paranoia, it was thought patient could benefit from a decrease in Zyprexa as it could be worsening is cognitive sxs and  given black box warning for his age category. Decreased Zyprexa to 2.5mg at bedtime as a trial. Can restart if psychosis worsens. Seems to be tolerating this well.   2/10: Discontinued Zyprexa    The risks, benefits, alternatives, and side effects were discussed and understood by the patient and other caregivers.     Medical Assessment and Plan     Medical diagnoses to be addressed this admission:    # Hip Pain  - New right hip pain, and mild pain in multiple joints. Moderate response to topical antiinflammatory gel and lidocaine patch.   - Medicine consulted for recommendations 12/20    # CHF  # Hypertension  - Continue PTA medications  Furosemide 40 mg daily  Lisinopril 40 mg daily  Metoprolol 50 mg daily  Amlodipine 10 mg daily  Clonidine 0.1 mg BID    # Hyperlipidemia  - Continue PTA Atorvastatin 40 mg     # Primary Hyperparathyroidism  # Hypercalcemia, hypophosphoremia   Increased Ca level to 10.9 and decreased Ph level to 2.3 in the ED   - Consulted endocrinology, who started cinacalcet 30 mg BID on 10/13  - 10/16 endocrinology recommended continuing to trend calcium and albumin to make sure patient does not become hypocalcemic and recommended holding cinacalcet   - 10/17, calcium and albumin wnl, endo recommended cinacalcet 30 mg once daily   - 10/21, per endo continue cincaclcet and recheck calcium and albumin on October 24  - 10/23: per endo, patient needs to follow up outpatient for further management of hyperparathyroidism     # Rhinorrhea  1/15 currently multiple COVID infections on unit. No other symptoms of COVID noted. COVID PCR - on 1/13.    #Chipped tooth: Pt chipped a molar at dinner on 2/4, denied pain. No bleeding.  - would benefit from outpt dental follow up.     Medical course: Patient was physically examined by the ED prior to being transferred to the unit and was found to be medically stable and appropriate for admission.      Consults: Psychiatry, Endocrinology (follow-up of  hyperparathyroidism / hypercalcemia and hypertension), neurology     Checklist     Legal Status: Committed   [place] meds: Haldol, Clozaril, Risperdal, Invega, Zyprexa, Seroquel, Abilify    Safety Assessment:   Behavioral Orders   Procedures    Code 1 - Restrict to Unit    Routine Programming     As clinically indicated    Status 15     Every 15 minutes.    Status Individual Observation     Patient SIO status reviewed with team/RN.  Please also refer to RN/team documentation for add'l detail.    -SIO staff to monitor following which have contributed to patient being on SIO:  Patient intrusiveness to other patients  -Possible interventions SIO staff could use to support patient's treatment progress:  Redirection  -When following observed, team will review discontinuation of SIO:  Improvement in intrusive behavior.     Order Specific Question:   CONTINUOUS 24 hours / day     Answer:   Other     Order Specific Question:   Specify distance     Answer:   10 feet     Order Specific Question:   Indications for SIO     Answer:   Severe intrusiveness       Risk Assessment:  Risk for harm is low  Risk factors: impulsive and past behaviors  Protective factors: family      SIO: No    Disposition: Pending transfer to memory care facility.  Accepted to [facility] for 2/24.      Attestations   Resident/Fellow Attestation   I, Sherrie Cosby MD, was present with the medical/ALLEN student who participated in the service and in the documentation of the note.  I have verified the history and personally performed the physical exam and medical decision making.  I agree with the assessment and plan of care as documented in the note.        Sherrie Cosby MD  PGY1  Date of Service (when I saw the patient): 02/12/25     Attestation:  This patient has been seen and evaluated by me, Pete Smith MD.  I have discussed this patient with the house staff team including the resident and/or medical student and I agree with the findings and plan in this  note.    I have reviewed today's vital signs, medications, labs and imaging. Pete Smith MD , PhD.

## 2025-02-12 NOTE — PLAN OF CARE
Team Note Due:  Monday    Assessment/Intervention/Current Symtoms and Care Coordination:  Chart review and met with team, discussed pt progress, symptomology, and response to treatment.  Discussed the discharge plan and any potential impediments to discharge. Clifford Aaron is emergency guardian/conservator until 02/27/2025. A petition for permanent guardianship/conservatorship has been filed and a hearing is scheduled for 2/27/25.    Discharge Plan or Goal:  Memory care facility     Barriers to Discharge:  Patient requires further psychiatric stabilization due to current symptomology, medication management with changes subject to provider, coordination with outside supports, and aftercare planning. Pt is under civil commitment.     Referral Status:  Memory Care approved:  [facility]. Tour completed 11/30. Per Clifford on 1/8, she would like to move forward with a referral. Tentative move in date is 2/24/25.  Vera (Exec Director): [phone]      Memory Care facilities currently in process:  [facility]. Tour completed 11/27.  [facility]. Tour scheduled 1/8/25. Have immediate openings and will accept EW. Family not requesting referral yet.  [facility]. Per Clifford on 1/20, she would like to move forward with a referral. Records sent 1/20.  [email or web address]    Memory Care facilities pending family review:  [facility].  [facility].  [facility].  [facility].  [facility].    Memory Care facilities declined:  [facility] (private pay only, no EW).  [facility] (private pay only, no EW).  [facility] (no openings).  [facility]. Tour completed 11/29. Records sent 12/2/24. Declined 12/19/24 (don't feel they are an appropriate facility for pt's needs).  Lorraine (marketing  director): [email or web address]}; [phone]  Isatu (director of nursing): [email or web address]  [facility].  Denied 12/26 (concerned about dementia with psychosis, history of hallucinations, high elopement risk, still on 1:1).  Nikki Noel: [email or web address]; Office [phone], Fax [phone]   [facility]. Not accepting EW as of 1/7/25.    Legal Status:  MI Commitment with [facility]  File Number: [id number]  Start and expiration date of commitment: 10/24/24 - 04/24/25    [facility] meds: Haldol, Clozaril, Risperdal, Invega, Zyprexa, Seroquel, Abilify    PPS/CM:  Shelby Chowdary: [phone]  [email or web address].    Contacts:  Maria C Aaron (Daughter): [phone]   Clifford Aaron (ex-wife): [phone]     No Del Angel (guardianship/conservatorship ): [phone]  [email or web address]    Caro Ross ([facility] probate court visitor for guardianship): [phone]  [email or web address]    Derrell Duff ([employer] ): [phone]  [email or web address]     Upcoming Meetings and Dates/Important Information and next steps:  Schedule ambulance transport when discharge is confirmed  Send discharge summary to [facility]  Clarify if they want pt discharged with meds or if meds should be sent to an outside pharmacy  Send PD/Discharge Summary to CM  Send PD/COS to [facility]    Provisional Discharge and Change of Status needed at discharge

## 2025-02-12 NOTE — PLAN OF CARE
The pt was observed in the lounge watching TV, socializing with selected peers and occasionally napping on and off. The pt exhibited a calm mood, with a flat/labile affect. Occasionally, the pt experienced intermittent confusion, which was managed by redirecting boundaries with peers and avoiding behavioral escalations. The pt denied any MH concerns and contracted for safety. The pt's appetite was good and maintained adequate fluid intake. PRN Tylenol was given for hip pain rated at 7/10, which the pt reported as helpful. The pt complied with med and care.    Problem: Adult Behavioral Health Plan of Care  Goal: Plan of Care Review  Outcome: Progressing  Flowsheets (Taken 2/12/2025 1600)  Plan of Care Reviewed With: patient  Overall Patient Progress: improving  Patient Agreement with Plan of Care: agrees     Problem: Psychotic Signs/Symptoms  Goal: Improved Mood Symptoms (Psychotic Signs/Symptoms)  Outcome: Progressing  Intervention: Optimize Emotion and Mood  Recent Flowsheet Documentation  Taken 2/12/2025 1600 by Jarrod Keenan RN  Supportive Measures: active listening utilized  Diversional Activity: television  Intervention: Optimize Emotion and Mood  Recent Flowsheet Documentation  Taken 2/12/2025 1600 by Jarrod Keenan RN  Supportive Measures: active listening utilized  Diversional Activity: television   Goal Outcome Evaluation:    Plan of Care Reviewed With: patient Plan of Care Reviewed With: patient    Overall Patient Progress: improvingOverall Patient Progress: improving

## 2025-02-12 NOTE — PLAN OF CARE
Problem: Sleep Disturbance  Goal: Adequate Sleep/Rest  Outcome: Progressing   Goal Outcome Evaluation:  Patient had an uneventful night. No behavioral issues. No complaints of pain. No requested or administered prn's. Respirations regular and non-labored. Routine safety checks in place q 15 minutes. Appears to have slept for 5.5 hours.

## 2025-02-12 NOTE — PLAN OF CARE
Problem: Psychotic Signs/Symptoms  Goal: Improved Behavioral Control (Psychotic Signs/Symptoms)  Outcome: Progressing   Goal Outcome Evaluation:    Plan of Care Reviewed With: patient    Patient visible in milieu most of this shift, affect flat, mood calm with intermittent confusion, patient denied all mental health symptoms, interactive with peers and staffs, patient had a brief moment he got irritable when he wonder into another patient room needing multiple redirections. Patient intake is adequate, hygiene is clean and dress appropriately.

## 2025-02-13 PROCEDURE — 124N000002 HC R&B MH UMMC

## 2025-02-13 PROCEDURE — 250N000013 HC RX MED GY IP 250 OP 250 PS 637

## 2025-02-13 PROCEDURE — 99231 SBSQ HOSP IP/OBS SF/LOW 25: CPT | Mod: GC | Performed by: PSYCHIATRY & NEUROLOGY

## 2025-02-13 RX ADMIN — CINACALCET 30 MG: 30 TABLET ORAL at 07:55

## 2025-02-13 RX ADMIN — MELATONIN TAB 3 MG 3 MG: 3 TAB at 20:15

## 2025-02-13 RX ADMIN — Medication 1 PATCH: at 07:55

## 2025-02-13 RX ADMIN — FUROSEMIDE 40 MG: 40 TABLET ORAL at 07:56

## 2025-02-13 RX ADMIN — SENNOSIDES AND DOCUSATE SODIUM 1 TABLET: 50; 8.6 TABLET ORAL at 07:56

## 2025-02-13 RX ADMIN — CLONIDINE HYDROCHLORIDE 0.1 MG: 0.1 TABLET ORAL at 07:57

## 2025-02-13 RX ADMIN — ATORVASTATIN CALCIUM 40 MG: 20 TABLET, FILM COATED ORAL at 07:56

## 2025-02-13 RX ADMIN — CLONIDINE HYDROCHLORIDE 0.1 MG: 0.1 TABLET ORAL at 20:14

## 2025-02-13 RX ADMIN — AMLODIPINE BESYLATE 10 MG: 5 TABLET ORAL at 07:57

## 2025-02-13 RX ADMIN — Medication 25 MG: at 20:15

## 2025-02-13 RX ADMIN — LISINOPRIL 40 MG: 10 TABLET ORAL at 07:56

## 2025-02-13 ASSESSMENT — ACTIVITIES OF DAILY LIVING (ADL)
ADLS_ACUITY_SCORE: 66
LAUNDRY: WITH SUPERVISION
ADLS_ACUITY_SCORE: 66
HYGIENE/GROOMING: INDEPENDENT
DRESS: STREET CLOTHES;INDEPENDENT
ORAL_HYGIENE: INDEPENDENT
ADLS_ACUITY_SCORE: 66

## 2025-02-13 NOTE — PLAN OF CARE
BEH IP Unit Acuity Rating Score (UARS)  Patient is given one point for every criteria they meet.    CRITERIA SCORING   On a 72 hour hold, court hold, committed, stay of commitment, or revocation. 1    Patient LOS on BEH unit exceeds 20 days. 1  LOS: 124   Patient under guardianship, 55+, otherwise medically complex, or under age 11. 1   Suicide ideation without relief of precipitating factors. 0   Current plan for suicide. 0   Current plan for homicide. 0   Imminent risk or actual attempt to seriously harm another without relief of factors precipitating the attempt. 1   Severe dysfunction in daily living (ex: complete neglect for self care, extreme disruption in vegetative function, extreme deterioration in social interactions). 1   Recent (last 7 days) or current physical aggression in the ED or on unit. 0   Restraints or seclusion episode in past 72 hours. 0   Recent (last 7 days) or current verbal aggression, agitation, yelling, etc., while in the ED or unit. 0   Active psychosis. 1   Need for constant or near constant redirection (from leaving, from others, etc).  0   Intrusive or disruptive behaviors. 0   Patient requires 3 or more hours of individualized nursing care per 8-hour shift (i.e. for ADLs, meds, therapeutic interventions). 0   TOTAL 6

## 2025-02-13 NOTE — PROGRESS NOTES
----------------------------------------------------------------------------------------------------------  [facility]  Psychiatry Progress Note  Hospital Day #124     Interim History:     The patient's care was discussed with the treatment team and chart notes were reviewed.    Identifier: Estevan Aaron is a 64 year old male with previous psychiatric diagnoses of  generalized anxiety disorder, Neurocognitive disorder, admitted from the ED 10/12/2024 due to concern for HI and psychosis, which now have resolved, in the psychosocial stressors (recent divorce).     Sleep: 6.5 hours (02/13/25 0715)  Scheduled medications: Took all scheduled medications as prescribed  Psychiatric PRN medications:  none       Staff Report:   Pt appears to have slept for 6.5 hours.  He woke up intermittently and sat/slept in the lounge area. He refused redirection to promote sleep. Pt denies pain, anxiety, depression, and SI/SIB. No PRN medications were given. 15 minutes safety checks were in place. Staff will continue to offer support to pt.      Subjective:     Patient Interview:  Santos was interviewed in the milieu, he was fixated on the reason why the team was talking to a new patient. Stated that he has no new concerns and problems and wanted to check if plan is still same for his discharge.     ROS:  Patient denies acute concerns apart from knee pain noted above.     Objective:     Vitals:  /80 (BP Location: Right arm)   Pulse 55   Temp 97.3  F (36.3  C) (Temporal)   Resp 18   Wt 114 kg (251 lb 6.4 oz)   SpO2 94%   BMI 40.58 kg/m      Allergies:  No Known Allergies    Current Medications:  Scheduled:  Current Facility-Administered Medications   Medication Dose Route Frequency Provider Last Rate Last Admin    acetaminophen (TYLENOL) tablet 650 mg  650 mg Oral Q4H PRN Nikki Caceres MD   650 mg at 02/12/25 2036    alum & mag hydroxide-simethicone (MAALOX) suspension 30 mL   30 mL Oral Q4H PRN Nikki Caceres MD   30 mL at 10/17/24 0837    amLODIPine (NORVASC) tablet 10 mg  10 mg Oral Daily Presley Barrera MD   10 mg at 02/13/25 0757    atorvastatin (LIPITOR) tablet 40 mg  40 mg Oral Daily Nikki Caceres MD   40 mg at 02/13/25 0756    cholecalciferol (VITAMIN D3) capsule 1,250 mcg  1,250 mcg Oral Q7 Days Evelia Grimm DO   1,250 mcg at 02/11/25 1127    cinacalcet (SENSIPAR) tablet 30 mg  30 mg Oral Daily Presley Barrera MD   30 mg at 02/13/25 0755    cloNIDine (CATAPRES) tablet 0.1 mg  0.1 mg Oral BID Presley Barrera MD   0.1 mg at 02/13/25 0757    diclofenac (VOLTAREN) 1 % topical gel 2 g  2 g Topical 4x Daily PRN Rocío Lopez MD   2 g at 12/22/24 2032    furosemide (LASIX) tablet 40 mg  40 mg Oral Daily Presley Barrera MD   40 mg at 02/13/25 0756    gabapentin (NEURONTIN) capsule 100 mg  100 mg Oral Q6H PRN Nikki Caceres MD   100 mg at 12/24/24 0046    hydrocortisone (CORTAID) 0.5 % cream   Topical Daily PRN Savi Chamorro MD        Lidocaine (LIDOCARE) 4 % Patch 1 patch  1 patch Transdermal Q24H PRN Rocío Lopez MD   1 patch at 12/22/24 2023    lisinopril (ZESTRIL) tablet 40 mg  40 mg Oral Daily Presley Barrera MD   40 mg at 02/13/25 0756    melatonin tablet 3 mg  3 mg Oral At Bedtime Presley Barrera MD   3 mg at 02/12/25 2035    metoprolol succinate ER (TOPROL XL) 24 hr tablet 50 mg  50 mg Oral Daily Presley Barrera MD   50 mg at 02/12/25 0758    nicotine (NICODERM CQ) 14 MG/24HR 24 hr patch 1 patch  1 patch Transdermal Daily Evelia Grimm DO   1 patch at 02/13/25 0755    nicotine (NICORETTE) gum 2 mg  2 mg Buccal Q1H PRN González Garcia MD   2 mg at 10/24/24 1254    OLANZapine (zyPREXA) tablet 5 mg  5 mg Oral TID PRN Evelia Grimm DO   5 mg at 01/05/25 1645    Or    OLANZapine (zyPREXA) injection 5 mg  5 mg Intramuscular TID PRN Evelia Grimm DO        polyethylene glycol (MIRALAX) Packet 17 g  17 g Oral Daily PRN  Nikki Caceres MD        senna-docusate (SENOKOT-S/PERICOLACE) 8.6-50 MG per tablet 1 tablet  1 tablet Oral Daily Evelia Grimm DO   1 tablet at 02/13/25 0756    traZODone (DESYREL) half-tab 25 mg  25 mg Oral At Bedtime Rocío Lopez MD   25 mg at 02/12/25 2036    traZODone (DESYREL) half-tab 25 mg  25 mg Oral At Bedtime PRN Evelia Grimm DO   25 mg at 12/24/24 0046       PRN:  Current Facility-Administered Medications   Medication Dose Route Frequency Provider Last Rate Last Admin    acetaminophen (TYLENOL) tablet 650 mg  650 mg Oral Q4H PRN Nikki Caceres MD   650 mg at 02/12/25 2036    alum & mag hydroxide-simethicone (MAALOX) suspension 30 mL  30 mL Oral Q4H PRN Nikki Caceres MD   30 mL at 10/17/24 0837    amLODIPine (NORVASC) tablet 10 mg  10 mg Oral Daily Presley Barrera MD   10 mg at 02/13/25 0757    atorvastatin (LIPITOR) tablet 40 mg  40 mg Oral Daily Nikki Caceres MD   40 mg at 02/13/25 0756    cholecalciferol (VITAMIN D3) capsule 1,250 mcg  1,250 mcg Oral Q7 Days Evelia Grimm DO   1,250 mcg at 02/11/25 1127    cinacalcet (SENSIPAR) tablet 30 mg  30 mg Oral Daily Presley Barrera MD   30 mg at 02/13/25 0755    cloNIDine (CATAPRES) tablet 0.1 mg  0.1 mg Oral BID Presley Barrera MD   0.1 mg at 02/13/25 0757    diclofenac (VOLTAREN) 1 % topical gel 2 g  2 g Topical 4x Daily PRN Rocío Lopez MD   2 g at 12/22/24 2032    furosemide (LASIX) tablet 40 mg  40 mg Oral Daily Presley Barrera MD   40 mg at 02/13/25 0756    gabapentin (NEURONTIN) capsule 100 mg  100 mg Oral Q6H PRN Nikki Caceres MD   100 mg at 12/24/24 0046    hydrocortisone (CORTAID) 0.5 % cream   Topical Daily PRN Savi Chamorro MD        Lidocaine (LIDOCARE) 4 % Patch 1 patch  1 patch Transdermal Q24H PRN Rocío Lopez MD   1 patch at 12/22/24 2023    lisinopril (ZESTRIL) tablet 40 mg  40 mg Oral Daily Presley Barrera MD   40 mg at 02/13/25 0755     melatonin tablet 3 mg  3 mg Oral At Bedtime Presley Barrera MD   3 mg at 02/12/25 2035    metoprolol succinate ER (TOPROL XL) 24 hr tablet 50 mg  50 mg Oral Daily Presley Barrera MD   50 mg at 02/12/25 0758    nicotine (NICODERM CQ) 14 MG/24HR 24 hr patch 1 patch  1 patch Transdermal Daily Evelia Grimm DO   1 patch at 02/13/25 0755    nicotine (NICORETTE) gum 2 mg  2 mg Buccal Q1H PRN González Garcia MD   2 mg at 10/24/24 1254    OLANZapine (zyPREXA) tablet 5 mg  5 mg Oral TID PRN Evelia Grimm DO   5 mg at 01/05/25 1645    Or    OLANZapine (zyPREXA) injection 5 mg  5 mg Intramuscular TID PRN Evelia Grimm DO        polyethylene glycol (MIRALAX) Packet 17 g  17 g Oral Daily PRN Nikki Caceres MD        senna-docusate (SENOKOT-S/PERICOLACE) 8.6-50 MG per tablet 1 tablet  1 tablet Oral Daily Evelia Grimm DO   1 tablet at 02/13/25 0756    traZODone (DESYREL) half-tab 25 mg  25 mg Oral At Bedtime Rocío Lopez MD   25 mg at 02/12/25 2036    traZODone (DESYREL) half-tab 25 mg  25 mg Oral At Bedtime PRN Evelia Grimm DO   25 mg at 12/24/24 0046     Labs and Imaging:  Data this admission:  - CBC unremarkable  - CMP unremarkable  - TSH normal  - UDS negative  - Vit D low  - Hgb A1c 5.9 (10/13/24)  - Lipids unremarkable  - Vit B12 normal  - Folate normal  - Urinalysis unremarkable  - EKG normal sinus rhythm, QTc 390 ms  - Head CT showed no acute changes  - HIV non reactive  - Treponema antibody non reactive  - ESR wnl   - Ceruloplasmin wnl   - BOOGIE negative  - Lyme negative      Parathyroid hormone:   85 (10/13)     Calcium:  11.4 (10/14) -> 10.3 (10/16) -> 9.8 (10/19) -> 10.6 (10/21) -> 10.3 (10/23)     Albumin:  4.3 (10/14) -->  4.3 (10/16) -> 4.1 (10/17) -> 4.2 (10/19) -> 4.5 (10/21) -> 4.3 (10/23)      Mental Status Exam:     Oriented to:  Person/Self  General:  Alert, eating in the lounge  Appearance:  Appears stated age, dressed in own clothing, overweight, grooming adequate with reported shower  "today  Behavior/Attitude: Cooperative, irritable   Eye Contact: Appropriate  Psychomotor: Normal and No evidence of tics, dystonia, or tardive dyskinesia  no catatonia present  Speech:  loud volume/tone  Language: Fluent in English with appropriate syntax and vocabulary.  Mood:  \"Fine\"  Affect:   appropriate and congruent with mood  Thought Process:   concrete, disorganized, confused  Thought Content:   No SI/HI/AH/VH;  No apparent delusions today  Associations:  loose  Insight:  impaired due to neurocognitive disorder   Judgment:  impaired due to neurocognitive disorder  Impulse control: impaired  Attention Span:  inattentive, limited  Concentration:   impaired by neurocognitive disorder  Recent and Remote Memory:   remote memory intact, recent memory impaired  Fund of Knowledge: average  Muscle Strength and Tone: normal  Gait and Station: Normal      Psychiatric Assessment     Estevan Aaron is a 64 year old male with previous psychiatric diagnoses of GEORGINA admitted from the ER on 10/12/2024 due to concern for HI and psychosis in the context of medical issues (hyperparathyroidism, hypercalcemia), psychosocial stressors including with recent divorce and selling his home. This is the patient's first psychiatric hospitalization. The MSE on admission was pertinent for a thought process which was perseverative, circumstantial, tangential, disorganized and tangential; with rambling and looseness of associations. Psychological contributions to presentation included lack of insight. Social factors contributing to presentation included isolation, recent divorce, selling his house, and moving from hotel to hotel. Biological contributions to presentation included a history of hyperparathyroidism with chronic hypercalcemia per charts as well as a history of methamphetamine use per collateral from ex-wife.     Per collateral from ex-wife, Santos has had paranoid ideations since they first met. He has always felt like people are out " to get him or trying to rip him off. She says that he has had visual hallucinations (he will point things out that the rest of the family can't see) for a long time, but the family would just go along with him to avoid making him angry. This timeline and presentation could be consistent with diagnosis of paranoid personality disorder. Things began to worsen around the beginning of the COVID pandemic, when Santos became more isolated and the family started to notice cognitive issues such as impaired memory. Other things that could be contributing to his presentation is hyperparathyroidism with hypercalcemia. However, patient's calcium returned to normal limits on 10/16, and patient continues to have disorganized behavior unrelated to calcium elevation, so likely this is not a significant contributing factor to patient's presentation.      Currently, Santos is at times disoriented, but easily re-directable. Presents with some difficulty of word articulation, which contributes to his frustration when interacting with psych team, as he finds it difficult to explain himself. He occasionally has struggled to attend to ADLS particularly bathing and required frequent encouragement, likely due to underlying disorganized behavior. Overall, he is mostly independent in the unit. His confused behavior tends to increase in the evenings, seemingly related to a Neurocognitive Disorder diagnosis, and this could evolve to be his new baseline. He does not present as a danger to himself or others so his SIO was discontinued. Santos does not present with any new episodes of physical agitation and is able to attend groups and interact with peers without major altercations. Patient currently is stable with current neuroleptic doses in regards to improved paranoia. However, neuroleptics could be worsening his brain fog and may benefit from a down titration as a trial while awaiting placement. He is doing well with a reduction of Zyprexa and the  plan is to discontinue it week of 2/10 before expected discharge on 2/24. Due to inability to care for self outside a supportive sxs, would benefit from continued hospital stay with discharge to group home/nursing home when available.          Psychiatric Plan by Diagnosis     Today's changes:  - no changes     # Major Neurocognitive Disorder  1. Medications:  - Trazodone 25 mg HS  - PRN Zyprexa 5 mg TID     3. Additional Plans:  - Patient will be treated in therapeutic milieu with appropriate individual and group therapies as described  - Pending memory facility placement.      # Unspecified anxiety vs Generalized Anxiety Disorder  - Monitor for symptoms.  - Fluoxetine held due to suspicion of ongoing manic symptoms     Psychiatric Hospital Course:      Estevan Aaron was admitted to [facility] on a 72 hour hold.   Medications:  PTA fluoxetine was held due to concern for worsening of zelalem   New medications started at the time of admission include Zyprexa.   Increased olanzapine 10 mg at bedtime was to 10 mg BID (10/14)   Increased olanzapine 10 mg BID to 10 mg during day and 15 mg at bedtime (10/15)  10/21: increased olanzapine pm dose from 15 to 20 mg  10/23: started melatonin 3 mg at 7 pm to help patient with circadian rhythm as he has been staying up throughout the night and sleeping a lot during the day and there is some concern for delirium   10/24: consulted anesthesia for MRI brain   10/28: MRI brain complete, decrease AM olanzapine from 10 to 5 mg  10/29: Morning olanzapine decreased to 2.5 mg, evening olanzapine decreased to 15 mg   11/1: Decreased evening olanzapine to 10 mg   11/4: Decreased evening olanzapine to 7.5 mg   11/5: Decreased evening olanzapine to 5 mg   11/11: Moved morning dose of olanzapine to the afternoon as patient appears more agitated in the afternoons/evenings  11/21: Started memantine 5 mg daily   11/26: Discontinued olanzapine 2.5 mg during the afternoon  12/2:   Discontinued  memantine 5 mg, daily  2/5: as psychosis has improved with less paranoia, it was thought patient could benefit from a decrease in Zyprexa as it could be worsening is cognitive sxs and given black box warning for his age category. Decreased Zyprexa to 2.5mg at bedtime as a trial. Can restart if psychosis worsens. Seems to be tolerating this well.   2/10: Discontinued Zyprexa    The risks, benefits, alternatives, and side effects were discussed and understood by the patient and other caregivers.     Medical Assessment and Plan     Medical diagnoses to be addressed this admission:    # Hip Pain  - New right hip pain, and mild pain in multiple joints. Moderate response to topical antiinflammatory gel and lidocaine patch.   - Medicine consulted for recommendations 12/20    # CHF  # Hypertension  - Continue PTA medications  Furosemide 40 mg daily  Lisinopril 40 mg daily  Metoprolol 50 mg daily  Amlodipine 10 mg daily  Clonidine 0.1 mg BID    # Hyperlipidemia  - Continue PTA Atorvastatin 40 mg     # Primary Hyperparathyroidism  # Hypercalcemia, hypophosphoremia   Increased Ca level to 10.9 and decreased Ph level to 2.3 in the ED   - Consulted endocrinology, who started cinacalcet 30 mg BID on 10/13  - 10/16 endocrinology recommended continuing to trend calcium and albumin to make sure patient does not become hypocalcemic and recommended holding cinacalcet   - 10/17, calcium and albumin wnl, endo recommended cinacalcet 30 mg once daily   - 10/21, per endo continue cincaclcet and recheck calcium and albumin on October 24  - 10/23: per endo, patient needs to follow up outpatient for further management of hyperparathyroidism     # Rhinorrhea  1/15 currently multiple COVID infections on unit. No other symptoms of COVID noted. COVID PCR - on 1/13.    #Chipped tooth: Pt chipped a molar at dinner on 2/4, denied pain. No bleeding.  - would benefit from outpt dental follow up.     Medical course: Patient was physically examined by  the ED prior to being transferred to the unit and was found to be medically stable and appropriate for admission.      Consults: Psychiatry, Endocrinology (follow-up of hyperparathyroidism / hypercalcemia and hypertension), neurology     Checklist     Legal Status: Committed   [place] meds: Haldol, Clozaril, Risperdal, Invega, Zyprexa, Seroquel, Abilify    Safety Assessment:   Behavioral Orders   Procedures    Code 1 - Restrict to Unit    Routine Programming     As clinically indicated    Status 15     Every 15 minutes.    Status Individual Observation     Patient SIO status reviewed with team/RN.  Please also refer to RN/team documentation for add'l detail.    -SIO staff to monitor following which have contributed to patient being on SIO:  Patient intrusiveness to other patients  -Possible interventions SIO staff could use to support patient's treatment progress:  Redirection  -When following observed, team will review discontinuation of SIO:  Improvement in intrusive behavior.     Order Specific Question:   CONTINUOUS 24 hours / day     Answer:   Other     Order Specific Question:   Specify distance     Answer:   10 feet     Order Specific Question:   Indications for SIO     Answer:   Severe intrusiveness       Risk Assessment:  Risk for harm is low  Risk factors: impulsive and past behaviors  Protective factors: family      SIO: No    Disposition: Pending transfer to memory care facility.  Accepted to [facility] for 2/24.      Attestations   Resident/Fellow Attestation   I, Sherrie Cosby MD, was present with the medical/ALLEN student who participated in the service and in the documentation of the note.  I have verified the history and personally performed the physical exam and medical decision making.  I agree with the assessment and plan of care as documented in the note.        Sherrie Cosby MD  PGY1  Date of Service (when I saw the patient): 02/13/25     Attestation:  This patient has been seen and evaluated by me,  Pete Smith MD.  I have discussed this patient with the house staff team including the resident and/or medical student and I agree with the findings and plan in this note.    I have reviewed today's vital signs, medications, labs and imaging. Pete Smith MD , PhD.

## 2025-02-13 NOTE — PLAN OF CARE
The pt was observed in the milieu, watching TV and interacting with peers. The pt maintained a similar presention as the previous encounter, displaying a calm mood, with a flat affect. The pt denied any MH concerns but occasionally made confused statements. The pt contracted for safety. The pt's appetite was good and maintained adequate fluid intake. The pt complied with med and care.    Problem: Adult Behavioral Health Plan of Care  Goal: Plan of Care Review  Outcome: Progressing  Flowsheets (Taken 2/13/2025 1630)  Plan of Care Reviewed With: patient  Overall Patient Progress: improving  Patient Agreement with Plan of Care: agrees     Problem: Psychotic Signs/Symptoms  Goal: Improved Behavioral Control (Psychotic Signs/Symptoms)  Outcome: Progressing  Intervention: Manage Behavior  Recent Flowsheet Documentation  Taken 2/13/2025 1630 by Jarrod Keenan RN  De-Escalation Techniques:   appropriate behavior reinforced   verbally redirected  Goal: Improved Mood Symptoms (Psychotic Signs/Symptoms)  Outcome: Progressing  Intervention: Optimize Emotion and Mood  Recent Flowsheet Documentation  Taken 2/13/2025 1630 by Jarrod Keenan RN  Supportive Measures: active listening utilized  Diversional Activity: television  Intervention: Optimize Emotion and Mood  Recent Flowsheet Documentation  Taken 2/13/2025 1630 by Jarrod Keenan RN  Supportive Measures: active listening utilized  Diversional Activity: television   Goal Outcome Evaluation:    Plan of Care Reviewed With: patient Plan of Care Reviewed With: patient    Overall Patient Progress: improvingOverall Patient Progress: improving

## 2025-02-13 NOTE — PLAN OF CARE
Team Note Due:  Monday    Assessment/Intervention/Current Symtoms and Care Coordination:  Chart review and met with team, discussed pt progress, symptomology, and response to treatment.  Discussed the discharge plan and any potential impediments to discharge. Clifford Aaron is emergency guardian/conservator until 02/27/2025. A petition for permanent guardianship/conservatorship has been filed and a hearing is scheduled for 2/27/25.    Discharge Plan or Goal:  Memory care facility     Barriers to Discharge:  Patient requires further psychiatric stabilization due to current symptomology, medication management with changes subject to provider, coordination with outside supports, and aftercare planning. Pt is under civil commitment.     Referral Status:  Memory Care approved:  [facility]. Tour completed 11/30. Per Clifford on 1/8, she would like to move forward with a referral. Tentative move in date is 2/24/25.  Vera (Exec Director): [phone]      Memory Care facilities currently in process:  [facility]. Tour completed 11/27.  [facility]. Tour scheduled 1/8/25. Have immediate openings and will accept EW. Family not requesting referral yet.  [facility]. Per Clifford on 1/20, she would like to move forward with a referral. Records sent 1/20.  [email or web address]    Memory Care facilities pending family review:  [facility].  [facility].  [facility].  [facility].  [facility].    Memory Care facilities declined:  [facility] (private pay only, no EW).  [facility] (private pay only, no EW).  [facility] (no openings).  [facility]. Tour completed 11/29. Records sent 12/2/24. Declined 12/19/24 (don't feel they are an appropriate facility for pt's needs).  Lorraine (marketing  director): [email or web address]; [phone]  Isatu (director of nursing): [email or web address]  [facility].  Denied 12/26 (concerned about dementia with psychosis, history of hallucinations, high elopement risk, still on 1:1).  Nikki Noel: [email or web address]; Office [phone], Fax [phone]   [facility]. Not accepting EW as of 1/7/25.    Legal Status:  [place] Commitment with [facility]  File Number: [id number]  Start and expiration date of commitment: 10/24/24 - 04/24/25    [facility] meds: Haldol, Clozaril, Risperdal, Invega, Zyprexa, Seroquel, Abilify    PPS/CM:  Shelby Chowdary: [phone]  [email or web address].    Contacts:  Maria C Aaron (Daughter): [phone]   Clifford Aaron (ex-wife): [phone]     No Del Angel (guardianship/conservatorship ): [phone]  [email or web address]    Caro Ross ([facility] probate court visitor for guardianship): [phone]  [email or web address]    Derrell Duff ([employer] ): [phone]  [email or web address]     Upcoming Meetings and Dates/Important Information and next steps:  Schedule ambulance transport when discharge is confirmed  Send discharge summary to [facility]  Clarify if they want pt discharged with meds or if meds should be sent to an outside pharmacy  Send PD/Discharge Summary to CM  Send PD/COS to [facility]    Provisional Discharge and Change of Status needed at discharge

## 2025-02-13 NOTE — PLAN OF CARE
Problem: Psychotic Signs/Symptoms  Goal: Improved Behavioral Control (Psychotic Signs/Symptoms)  Outcome: Progressing  Intervention: Manage Behavior  Recent Flowsheet Documentation  Taken 2/13/2025 1200 by Mable Ford RN  De-Escalation Techniques:   appropriate behavior reinforced   verbally redirected     Problem: Psychotic Signs/Symptoms  Goal: Improved Mood Symptoms (Psychotic Signs/Symptoms)  Intervention: Optimize Emotion and Mood  Recent Flowsheet Documentation  Taken 2/13/2025 1200 by Mable Ford RN  Supportive Measures: active listening utilized  Diversional Activity: television   Goal Outcome Evaluation:    Plan of Care Reviewed With: patient    Patient visible in Milieu most of this shift, presented with flat affect, pleasant but disorganized thought precess. Patient interactive with staffs and peers, he denied all mental health symptoms, has intermittent confusion and was requesting to call main office, he was assisted with call and staff spoke with patient using the other line, he appeared calm and no further request to call . Patient continue require  redirections  to maintain appropriate boundaries. His intake is adequate, hygiene is unshaved and unkept, no safety concern noted this shift.

## 2025-02-13 NOTE — PLAN OF CARE
Problem: Sleep Disturbance  Goal: Adequate Sleep/Rest  Outcome: Progressing    Pt appears to have slept for 6.5 hours.  He woke up intermittently and sat/slept in the lounge area. He refused redirection to promote sleep. Pt denies pain, anxiety, depression, and SI/SIB. No PRN medications were given. 15 minutes safety checks were in place. Staff will continue to offer support to pt.

## 2025-02-14 PROCEDURE — 99231 SBSQ HOSP IP/OBS SF/LOW 25: CPT | Mod: GC | Performed by: PSYCHIATRY & NEUROLOGY

## 2025-02-14 PROCEDURE — 250N000013 HC RX MED GY IP 250 OP 250 PS 637

## 2025-02-14 PROCEDURE — 124N000002 HC R&B MH UMMC

## 2025-02-14 RX ADMIN — SENNOSIDES AND DOCUSATE SODIUM 1 TABLET: 50; 8.6 TABLET ORAL at 07:42

## 2025-02-14 RX ADMIN — MELATONIN TAB 3 MG 3 MG: 3 TAB at 21:27

## 2025-02-14 RX ADMIN — CINACALCET 30 MG: 30 TABLET ORAL at 07:43

## 2025-02-14 RX ADMIN — Medication 25 MG: at 21:27

## 2025-02-14 RX ADMIN — Medication 1 PATCH: at 07:42

## 2025-02-14 RX ADMIN — LISINOPRIL 40 MG: 10 TABLET ORAL at 07:42

## 2025-02-14 RX ADMIN — AMLODIPINE BESYLATE 10 MG: 5 TABLET ORAL at 07:42

## 2025-02-14 RX ADMIN — FUROSEMIDE 40 MG: 40 TABLET ORAL at 07:42

## 2025-02-14 RX ADMIN — ATORVASTATIN CALCIUM 40 MG: 20 TABLET, FILM COATED ORAL at 07:42

## 2025-02-14 RX ADMIN — CLONIDINE HYDROCHLORIDE 0.1 MG: 0.1 TABLET ORAL at 21:23

## 2025-02-14 RX ADMIN — METOPROLOL SUCCINATE 50 MG: 50 TABLET, EXTENDED RELEASE ORAL at 07:42

## 2025-02-14 RX ADMIN — CLONIDINE HYDROCHLORIDE 0.1 MG: 0.1 TABLET ORAL at 07:42

## 2025-02-14 ASSESSMENT — ACTIVITIES OF DAILY LIVING (ADL)
ADLS_ACUITY_SCORE: 66
ADLS_ACUITY_SCORE: 76
HYGIENE/GROOMING: INDEPENDENT;PROMPTS
ADLS_ACUITY_SCORE: 76
ADLS_ACUITY_SCORE: 66
ADLS_ACUITY_SCORE: 76
ADLS_ACUITY_SCORE: 66
LAUNDRY: WITH SUPERVISION
ADLS_ACUITY_SCORE: 76
DRESS: INDEPENDENT
ADLS_ACUITY_SCORE: 66
ADLS_ACUITY_SCORE: 66
ADLS_ACUITY_SCORE: 76
ADLS_ACUITY_SCORE: 66
ADLS_ACUITY_SCORE: 66
ORAL_HYGIENE: INDEPENDENT
HYGIENE/GROOMING: INDEPENDENT;PROMPTS
ADLS_ACUITY_SCORE: 76
DRESS: INDEPENDENT
ORAL_HYGIENE: INDEPENDENT
ADLS_ACUITY_SCORE: 76
ADLS_ACUITY_SCORE: 66
LAUNDRY: WITH SUPERVISION

## 2025-02-14 NOTE — PROGRESS NOTES
"  ----------------------------------------------------------------------------------------------------------  [facility]  Psychiatry Progress Note  Hospital Day #125     Interim History:     The patient's care was discussed with the treatment team and chart notes were reviewed.    Identifier: Estevan Aaron is a 64 year old male with previous psychiatric diagnoses of  generalized anxiety disorder, Neurocognitive disorder, admitted from the ED 10/12/2024 due to concern for HI and psychosis, which now have resolved, in the psychosocial stressors (recent divorce).     Sleep: 2.75 hours (02/14/25 0600)  Scheduled medications: Took all scheduled medications as prescribed  Psychiatric PRN medications:  none       Staff Report:   Patient sleeping the beginning of the shift. Paient was calm and conversing with the staff. Patient mood labile. Patient became agitated and argumentative. Patient came to the desk and he stated \"I am in charge, I am the boss here. What happened last night everybody have guns\" Patient was not easy to redirect. Patient was very intrusive. Patient knows how to open the nurses stations door he went in the nurses station. Duress was activated and Domo team was called. Patient is very confused. Patient went back to his room. Patient had 2.75 hours of sleep this shift. Patient plan to discharge to a memory care unit on 2/24/25. Patient no complaints of pain and discomfort. Patient's TEDS hose off per his report. Will continue to monitor the patient and notify MD with any issues.       Subjective:     Patient Interview:  Santos was interviewed in his room. He was laying down in his bed and was sleepy. He seemed confused and was asking about the \"the project\" but was not able to elaborate further on that. He was asking who am I and why we are keeping him here.    ROS:  Patient denies acute concerns apart from knee pain noted above.     Objective:     Vitals:  BP " 137/70 (BP Location: Right arm, Patient Position: Sitting, Cuff Size: Adult Large)   Pulse 63   Temp 97.6  F (36.4  C) (Oral)   Resp 16   Wt 114 kg (251 lb 6.4 oz)   SpO2 95%   BMI 40.58 kg/m      Allergies:  No Known Allergies    Current Medications:  Scheduled:  Current Facility-Administered Medications   Medication Dose Route Frequency Provider Last Rate Last Admin    acetaminophen (TYLENOL) tablet 650 mg  650 mg Oral Q4H PRN Nikki Caceres MD   650 mg at 02/12/25 2036    alum & mag hydroxide-simethicone (MAALOX) suspension 30 mL  30 mL Oral Q4H PRN Nikki Caceres MD   30 mL at 10/17/24 0837    amLODIPine (NORVASC) tablet 10 mg  10 mg Oral Daily Presley Barrera MD   10 mg at 02/14/25 0742    atorvastatin (LIPITOR) tablet 40 mg  40 mg Oral Daily Nikki Caceres MD   40 mg at 02/14/25 0742    cholecalciferol (VITAMIN D3) capsule 1,250 mcg  1,250 mcg Oral Q7 Days Evelia Grimm DO   1,250 mcg at 02/11/25 1127    cinacalcet (SENSIPAR) tablet 30 mg  30 mg Oral Daily Presley Barrera MD   30 mg at 02/14/25 0743    cloNIDine (CATAPRES) tablet 0.1 mg  0.1 mg Oral BID Presley Barrera MD   0.1 mg at 02/14/25 0742    diclofenac (VOLTAREN) 1 % topical gel 2 g  2 g Topical 4x Daily PRN Rocío Lopez MD   2 g at 12/22/24 2032    furosemide (LASIX) tablet 40 mg  40 mg Oral Daily Presley Barrera MD   40 mg at 02/14/25 0742    gabapentin (NEURONTIN) capsule 100 mg  100 mg Oral Q6H PRN Nikki Caceres MD   100 mg at 12/24/24 0046    hydrocortisone (CORTAID) 0.5 % cream   Topical Daily PRN Savi Chamorro MD        Lidocaine (LIDOCARE) 4 % Patch 1 patch  1 patch Transdermal Q24H PRN Rocío Lopez MD   1 patch at 12/22/24 2023    lisinopril (ZESTRIL) tablet 40 mg  40 mg Oral Daily Presley Barrera MD   40 mg at 02/14/25 0742    melatonin tablet 3 mg  3 mg Oral At Bedtime Presley Barrera MD   3 mg at 02/13/25 2015    metoprolol succinate ER (TOPROL XL) 24 hr tablet  50 mg  50 mg Oral Daily Presley Barrera MD   50 mg at 02/14/25 0742    nicotine (NICODERM CQ) 14 MG/24HR 24 hr patch 1 patch  1 patch Transdermal Daily Evelia Grimm DO   1 patch at 02/14/25 0742    nicotine (NICORETTE) gum 2 mg  2 mg Buccal Q1H PRN González Garcia MD   2 mg at 10/24/24 1254    OLANZapine (zyPREXA) tablet 5 mg  5 mg Oral TID PRN Evelia Grimm DO   5 mg at 01/05/25 1645    Or    OLANZapine (zyPREXA) injection 5 mg  5 mg Intramuscular TID PRN Evelia Grimm DO        polyethylene glycol (MIRALAX) Packet 17 g  17 g Oral Daily PRN Nikki Caceres MD        senna-docusate (SENOKOT-S/PERICOLACE) 8.6-50 MG per tablet 1 tablet  1 tablet Oral Daily Evelia Grimm DO   1 tablet at 02/14/25 0742    traZODone (DESYREL) half-tab 25 mg  25 mg Oral At Bedtime Rocío Lopez MD   25 mg at 02/13/25 2015    traZODone (DESYREL) half-tab 25 mg  25 mg Oral At Bedtime PRN Evelia Grimm DO   25 mg at 12/24/24 0046       PRN:  Current Facility-Administered Medications   Medication Dose Route Frequency Provider Last Rate Last Admin    acetaminophen (TYLENOL) tablet 650 mg  650 mg Oral Q4H PRN Nikki Caceres MD   650 mg at 02/12/25 2036    alum & mag hydroxide-simethicone (MAALOX) suspension 30 mL  30 mL Oral Q4H PRN Nikki Caceres MD   30 mL at 10/17/24 0837    amLODIPine (NORVASC) tablet 10 mg  10 mg Oral Daily Presley Barrera MD   10 mg at 02/14/25 0742    atorvastatin (LIPITOR) tablet 40 mg  40 mg Oral Daily Nikki Caceres MD   40 mg at 02/14/25 0742    cholecalciferol (VITAMIN D3) capsule 1,250 mcg  1,250 mcg Oral Q7 Days Evelia Grimm DO   1,250 mcg at 02/11/25 1127    cinacalcet (SENSIPAR) tablet 30 mg  30 mg Oral Daily Presley Barrera MD   30 mg at 02/14/25 0743    cloNIDine (CATAPRES) tablet 0.1 mg  0.1 mg Oral BID Presley Barrera MD   0.1 mg at 02/14/25 0742    diclofenac (VOLTAREN) 1 % topical gel 2 g  2 g Topical 4x Daily PRN Rocío Lopez MD   2 g at 12/22/24  2032    furosemide (LASIX) tablet 40 mg  40 mg Oral Daily Presley Barrera MD   40 mg at 02/14/25 0742    gabapentin (NEURONTIN) capsule 100 mg  100 mg Oral Q6H PRN Nikki Caceres MD   100 mg at 12/24/24 0046    hydrocortisone (CORTAID) 0.5 % cream   Topical Daily PRN Savi Chamorro MD        Lidocaine (LIDOCARE) 4 % Patch 1 patch  1 patch Transdermal Q24H PRN Rocío Lopez MD   1 patch at 12/22/24 2023    lisinopril (ZESTRIL) tablet 40 mg  40 mg Oral Daily Presley Barrera MD   40 mg at 02/14/25 0742    melatonin tablet 3 mg  3 mg Oral At Bedtime Presley Brarera MD   3 mg at 02/13/25 2015    metoprolol succinate ER (TOPROL XL) 24 hr tablet 50 mg  50 mg Oral Daily Presley Barrera MD   50 mg at 02/14/25 0742    nicotine (NICODERM CQ) 14 MG/24HR 24 hr patch 1 patch  1 patch Transdermal Daily Evelia Grimm DO   1 patch at 02/14/25 0742    nicotine (NICORETTE) gum 2 mg  2 mg Buccal Q1H PRN González Garcia MD   2 mg at 10/24/24 1254    OLANZapine (zyPREXA) tablet 5 mg  5 mg Oral TID PRN Evelia Grimm DO   5 mg at 01/05/25 1645    Or    OLANZapine (zyPREXA) injection 5 mg  5 mg Intramuscular TID PRN Evelia Grimm DO        polyethylene glycol (MIRALAX) Packet 17 g  17 g Oral Daily PRN Nikki Caceres MD        senna-docusate (SENOKOT-S/PERICOLACE) 8.6-50 MG per tablet 1 tablet  1 tablet Oral Daily Evelia Grimm DO   1 tablet at 02/14/25 0742    traZODone (DESYREL) half-tab 25 mg  25 mg Oral At Bedtime Rocío Lopez MD   25 mg at 02/13/25 2015    traZODone (DESYREL) half-tab 25 mg  25 mg Oral At Bedtime PRN Evelia Grimm DO   25 mg at 12/24/24 0046     Labs and Imaging:  Data this admission:  - CBC unremarkable  - CMP unremarkable  - TSH normal  - UDS negative  - Vit D low  - Hgb A1c 5.9 (10/13/24)  - Lipids unremarkable  - Vit B12 normal  - Folate normal  - Urinalysis unremarkable  - EKG normal sinus rhythm, QTc 390 ms  - Head CT showed no acute changes  - HIV non  "reactive  - Treponema antibody non reactive  - ESR wnl   - Ceruloplasmin wnl   - BOOGIE negative  - Lyme negative      Parathyroid hormone:   85 (10/13)     Calcium:  11.4 (10/14) -> 10.3 (10/16) -> 9.8 (10/19) -> 10.6 (10/21) -> 10.3 (10/23)     Albumin:  4.3 (10/14) -->  4.3 (10/16) -> 4.1 (10/17) -> 4.2 (10/19) -> 4.5 (10/21) -> 4.3 (10/23)      Mental Status Exam:     Oriented to:  Person/Self  General:  Alert, eating in the lounge  Appearance:  Appears stated age, dressed in own clothing, overweight, grooming adequate with reported shower today  Behavior/Attitude: Cooperative, irritable   Eye Contact: Appropriate  Psychomotor: Normal and No evidence of tics, dystonia, or tardive dyskinesia  no catatonia present  Speech:  loud volume/tone  Language: Fluent in English with appropriate syntax and vocabulary.  Mood:  \"Fine\"  Affect:   appropriate and congruent with mood  Thought Process:   concrete, disorganized, confused  Thought Content:   No SI/HI/AH/VH;  No apparent delusions today  Associations:  loose  Insight:  impaired due to neurocognitive disorder   Judgment:  impaired due to neurocognitive disorder  Impulse control: impaired  Attention Span:  inattentive, limited  Concentration:   impaired by neurocognitive disorder  Recent and Remote Memory:   remote memory intact, recent memory impaired  Fund of Knowledge: average  Muscle Strength and Tone: normal  Gait and Station: Normal      Psychiatric Assessment     Estevan Aaron is a 64 year old male with previous psychiatric diagnoses of GEORGINA admitted from the ER on 10/12/2024 due to concern for HI and psychosis in the context of medical issues (hyperparathyroidism, hypercalcemia), psychosocial stressors including with recent divorce and selling his home. This is the patient's first psychiatric hospitalization. The MSE on admission was pertinent for a thought process which was perseverative, circumstantial, tangential, disorganized and tangential; with rambling and " looseness of associations. Psychological contributions to presentation included lack of insight. Social factors contributing to presentation included isolation, recent divorce, selling his house, and moving from hotel to hotel. Biological contributions to presentation included a history of hyperparathyroidism with chronic hypercalcemia per charts as well as a history of methamphetamine use per collateral from ex-wife.     Per collateral from ex-wife, Santos has had paranoid ideations since they first met. He has always felt like people are out to get him or trying to rip him off. She says that he has had visual hallucinations (he will point things out that the rest of the family can't see) for a long time, but the family would just go along with him to avoid making him angry. This timeline and presentation could be consistent with diagnosis of paranoid personality disorder. Things began to worsen around the beginning of the COVID pandemic, when Santos became more isolated and the family started to notice cognitive issues such as impaired memory. Other things that could be contributing to his presentation is hyperparathyroidism with hypercalcemia. However, patient's calcium returned to normal limits on 10/16, and patient continues to have disorganized behavior unrelated to calcium elevation, so likely this is not a significant contributing factor to patient's presentation.      Currently, Santos is at times disoriented, but easily re-directable. Presents with some difficulty of word articulation, which contributes to his frustration when interacting with psych team, as he finds it difficult to explain himself. He occasionally has struggled to attend to ADLS particularly bathing and required frequent encouragement, likely due to underlying disorganized behavior. Overall, he is mostly independent in the unit. His confused behavior tends to increase in the evenings, seemingly related to a Neurocognitive Disorder diagnosis, and  this could evolve to be his new baseline. He does not present as a danger to himself or others so his SIO was discontinued. Santos does not present with any new episodes of physical agitation and is able to attend groups and interact with peers without major altercations. Patient currently is stable with current neuroleptic doses in regards to improved paranoia. However, neuroleptics could be worsening his brain fog and may benefit from a down titration as a trial while awaiting placement. He is doing well with a reduction of Zyprexa and the plan is to discontinue it week of 2/10 before expected discharge on 2/24. Due to inability to care for self outside a supportive sxs, would benefit from continued hospital stay with discharge to group home/nursing home when available.          Psychiatric Plan by Diagnosis     Today's changes:  - no changes     # Major Neurocognitive Disorder  1. Medications:  - Trazodone 25 mg HS  - PRN Zyprexa 5 mg TID     3. Additional Plans:  - Patient will be treated in therapeutic milieu with appropriate individual and group therapies as described  - Pending memory facility placement.      # Unspecified anxiety vs Generalized Anxiety Disorder  - Monitor for symptoms.  - Fluoxetine held due to suspicion of ongoing manic symptoms     Psychiatric Hospital Course:      Estevan Aaron was admitted to [facility] on a 72 hour hold.   Medications:  PTA fluoxetine was held due to concern for worsening of zelalem   New medications started at the time of admission include Zyprexa.   Increased olanzapine 10 mg at bedtime was to 10 mg BID (10/14)   Increased olanzapine 10 mg BID to 10 mg during day and 15 mg at bedtime (10/15)  10/21: increased olanzapine pm dose from 15 to 20 mg  10/23: started melatonin 3 mg at 7 pm to help patient with circadian rhythm as he has been staying up throughout the night and sleeping a lot during the day and there is some concern for delirium   10/24: consulted anesthesia for  MRI brain   10/28: MRI brain complete, decrease AM olanzapine from 10 to 5 mg  10/29: Morning olanzapine decreased to 2.5 mg, evening olanzapine decreased to 15 mg   11/1: Decreased evening olanzapine to 10 mg   11/4: Decreased evening olanzapine to 7.5 mg   11/5: Decreased evening olanzapine to 5 mg   11/11: Moved morning dose of olanzapine to the afternoon as patient appears more agitated in the afternoons/evenings  11/21: Started memantine 5 mg daily   11/26: Discontinued olanzapine 2.5 mg during the afternoon  12/2:   Discontinued memantine 5 mg, daily  2/5: as psychosis has improved with less paranoia, it was thought patient could benefit from a decrease in Zyprexa as it could be worsening is cognitive sxs and given black box warning for his age category. Decreased Zyprexa to 2.5mg at bedtime as a trial. Can restart if psychosis worsens. Seems to be tolerating this well.   2/10: Discontinued Zyprexa    The risks, benefits, alternatives, and side effects were discussed and understood by the patient and other caregivers.     Medical Assessment and Plan     Medical diagnoses to be addressed this admission:    # Hip Pain  - New right hip pain, and mild pain in multiple joints. Moderate response to topical antiinflammatory gel and lidocaine patch.   - Medicine consulted for recommendations 12/20    # CHF  # Hypertension  - Continue PTA medications  Furosemide 40 mg daily  Lisinopril 40 mg daily  Metoprolol 50 mg daily  Amlodipine 10 mg daily  Clonidine 0.1 mg BID    # Hyperlipidemia  - Continue PTA Atorvastatin 40 mg     # Primary Hyperparathyroidism  # Hypercalcemia, hypophosphoremia   Increased Ca level to 10.9 and decreased Ph level to 2.3 in the ED   - Consulted endocrinology, who started cinacalcet 30 mg BID on 10/13  - 10/16 endocrinology recommended continuing to trend calcium and albumin to make sure patient does not become hypocalcemic and recommended holding cinacalcet   - 10/17, calcium and albumin wnl,  endo recommended cinacalcet 30 mg once daily   - 10/21, per endo continue cincaclcet and recheck calcium and albumin on October 24  - 10/23: per endo, patient needs to follow up outpatient for further management of hyperparathyroidism     # Rhinorrhea  1/15 currently multiple COVID infections on unit. No other symptoms of COVID noted. COVID PCR - on 1/13.    #Chipped tooth: Pt chipped a molar at dinner on 2/4, denied pain. No bleeding.  - would benefit from outpt dental follow up.     Medical course: Patient was physically examined by the ED prior to being transferred to the unit and was found to be medically stable and appropriate for admission.      Consults: Psychiatry, Endocrinology (follow-up of hyperparathyroidism / hypercalcemia and hypertension), neurology     Checklist     Legal Status: Committed   [facility] meds: Haldol, Clozaril, Risperdal, Invega, Zyprexa, Seroquel, Abilify    Safety Assessment:   Behavioral Orders   Procedures    Code 1 - Restrict to Unit    Routine Programming     As clinically indicated    Status 15     Every 15 minutes.    Status Individual Observation     Patient SIO status reviewed with team/RN.  Please also refer to RN/team documentation for add'l detail.    -SIO staff to monitor following which have contributed to patient being on SIO:  Patient intrusiveness to other patients  -Possible interventions SIO staff could use to support patient's treatment progress:  Redirection  -When following observed, team will review discontinuation of SIO:  Improvement in intrusive behavior.     Order Specific Question:   CONTINUOUS 24 hours / day     Answer:   Other     Order Specific Question:   Specify distance     Answer:   10 feet     Order Specific Question:   Indications for SIO     Answer:   Severe intrusiveness       Risk Assessment:  Risk for harm is low  Risk factors: impulsive and past behaviors  Protective factors: family      SIO: No    Disposition: [facility]  [facility].  Accepted to [facility] for 2/24.      Attestations   Sherrie Cosby MD  PGY1 Psychiatry Resident    Attestation:  This patient has been seen and evaluated by me, Pete Smith MD.  I have discussed this patient with the house staff team including the resident and/or medical student and I agree with the findings and plan in this note.    I have reviewed today's vital signs, medications, labs and imaging. Pete Smith MD , PhD.

## 2025-02-14 NOTE — PLAN OF CARE
"  Problem: Sleep Disturbance  Goal: Adequate Sleep/Rest  Outcome: Not Progressing   Patient sleeping the beginning of the shift. Paient was calm and conversing with the staff. Patient mood labile. Patient became agitated and argumentative. Patient came to the desk and he stated \"I am in charge, I am the boss here. What happened last night everybody have guns\" Patient was not easy to redirect. Patient was very intrusive. Patient knows how to open the nurses stations door he went in the nurses station. Duress was activated and Domo team was called. Patient is very confused. Patient went back to his room. Patient had 2.75 hours of sleep this shift. Patient plan to discharge to a memory care unit on 2/24/25. Patient no complaints of pain and discomfort. Patient's TEDS hose off per his  report. Will continue to monitor the patient and notify MD with any issues.  "

## 2025-02-14 NOTE — PLAN OF CARE
BEH IP Unit Acuity Rating Score (UARS)  Patient is given one point for every criteria they meet.    CRITERIA SCORING   On a 72 hour hold, court hold, committed, stay of commitment, or revocation. 1    Patient LOS on BEH unit exceeds 20 days. 1  LOS: 125   Patient under guardianship, 55+, otherwise medically complex, or under age 11. 1   Suicide ideation without relief of precipitating factors. 0   Current plan for suicide. 0   Current plan for homicide. 0   Imminent risk or actual attempt to seriously harm another without relief of factors precipitating the attempt. 1   Severe dysfunction in daily living (ex: complete neglect for self care, extreme disruption in vegetative function, extreme deterioration in social interactions). 1   Recent (last 7 days) or current physical aggression in the ED or on unit. 0   Restraints or seclusion episode in past 72 hours. 0   Recent (last 7 days) or current verbal aggression, agitation, yelling, etc., while in the ED or unit. 0   Active psychosis. 1   Need for constant or near constant redirection (from leaving, from others, etc).  0   Intrusive or disruptive behaviors. 0   Patient requires 3 or more hours of individualized nursing care per 8-hour shift (i.e. for ADLs, meds, therapeutic interventions). 0   TOTAL 6

## 2025-02-14 NOTE — PLAN OF CARE
Pt is intermittently visible in the milieu, sleeping on and off. Pt is selectively social with peers, did not attend OT groups today. Pt denies all mental health symptoms upon assessment. Pt is medication compliant without issue, did not receive any PRNs. Pt continues to be confused during conversation, asks if the nursing station is the plumbing desk. He has been redirectable without any further behavioral escalation. Hygiene is unkempt, intake is adequate. No other concerns at this time.    Problem: Psychotic Signs/Symptoms  Goal: Improved Behavioral Control (Psychotic Signs/Symptoms)  Outcome: Progressing   Goal Outcome Evaluation:    Plan of Care Reviewed With: patient

## 2025-02-14 NOTE — PLAN OF CARE
Team Note Due:  Monday    Assessment/Intervention/Current Symtoms and Care Coordination:  Chart review and met with team, discussed pt progress, symptomology, and response to treatment.  Discussed the discharge plan and any potential impediments to discharge. Clifford Aaron is emergency guardian/conservator until 02/27/2025. A petition for permanent guardianship/conservatorship has been filed and a hearing is scheduled for 2/27/25.    Discharge Plan or Goal:  Memory care facility     Barriers to Discharge:  Patient requires further psychiatric stabilization due to current symptomology, medication management with changes subject to provider, coordination with outside supports, and aftercare planning. Pt is under civil commitment.     Referral Status:  Memory Care approved:  [facility]. Tour completed 11/30. Per Clifford on 1/8, she would like to move forward with a referral. Tentative move in date is 2/24/25.  Vera (Exec Director): [phone]      Memory Care facilities currently in process:  [facility]. Tour completed 11/27.  [facility]. Tour scheduled 1/8/25. Have immediate openings and will accept EW. Family not requesting referral yet.  [facility]. Per Clifford on 1/20, she would like to move forward with a referral. Records sent 1/20.  [email or web address]    Memory Care facilities pending family review:  [facility].  [facility].  [facility].  [facility].  [facility].    Memory Care facilities declined:  [facility] (private pay only, no EW).  [facility] (private pay only, no EW).  [facility] (no openings).  [facility]. Tour completed 11/29. Records sent 12/2/24. Declined 12/19/24 (don't feel they are an appropriate facility for pt's needs).  Lorraine (marketing  director): [email or web address]; [phone]  Isatu (director of nursing): [email or web address]  [facility].  Denied 12/26 (concerned about dementia with psychosis, history of hallucinations, high elopement risk, still on 1:1).  Nikki Noel: [email or web address]; Office [phone], Fax [phone]   [facility]. Not accepting EW as of 1/7/25.    Legal Status:  MI Commitment with [facility]  File Number: [id number]  Start and expiration date of commitment: 10/24/24 - 04/24/25    [facility] meds: Haldol, Clozaril, Risperdal, Invega, Zyprexa, Seroquel, Abilify    PPS/CM:  Shelby Chowdary: [phone]  [email or web address].    Contacts:  Maria C Aaron (Daughter): [phone]   Clifford Aaron (ex-wife): [phone]     No Del Angel (guardianship/conservatorship ): [phone]  [email or web address]    Caro Ross ([facility] probate court visitor for guardianship): [phone]  [email or web address]    Derrell Duff ([employer] ): [phone]  [email or web address]     Upcoming Meetings and Dates/Important Information and next steps:  Schedule ambulance transport when discharge is confirmed  Send discharge summary to [facility]  Clarify if they want pt discharged with meds or if meds should be sent to an outside pharmacy  Send PD/Discharge Summary to CM  Send PD/COS to [facility]    Provisional Discharge and Change of Status needed at discharge

## 2025-02-15 PROCEDURE — 250N000013 HC RX MED GY IP 250 OP 250 PS 637

## 2025-02-15 PROCEDURE — 124N000002 HC R&B MH UMMC

## 2025-02-15 RX ADMIN — CINACALCET 30 MG: 30 TABLET ORAL at 07:51

## 2025-02-15 RX ADMIN — Medication 1 PATCH: at 07:52

## 2025-02-15 RX ADMIN — AMLODIPINE BESYLATE 10 MG: 5 TABLET ORAL at 07:52

## 2025-02-15 RX ADMIN — CLONIDINE HYDROCHLORIDE 0.1 MG: 0.1 TABLET ORAL at 07:52

## 2025-02-15 RX ADMIN — LISINOPRIL 40 MG: 10 TABLET ORAL at 07:52

## 2025-02-15 RX ADMIN — SENNOSIDES AND DOCUSATE SODIUM 1 TABLET: 50; 8.6 TABLET ORAL at 07:51

## 2025-02-15 RX ADMIN — CLONIDINE HYDROCHLORIDE 0.1 MG: 0.1 TABLET ORAL at 20:44

## 2025-02-15 RX ADMIN — Medication 25 MG: at 20:44

## 2025-02-15 RX ADMIN — ATORVASTATIN CALCIUM 40 MG: 20 TABLET, FILM COATED ORAL at 07:51

## 2025-02-15 RX ADMIN — SODIUM CHLORIDE 1 DROP: 20 SOLUTION OPHTHALMIC at 12:05

## 2025-02-15 RX ADMIN — MELATONIN TAB 3 MG 3 MG: 3 TAB at 20:44

## 2025-02-15 RX ADMIN — FUROSEMIDE 40 MG: 40 TABLET ORAL at 07:52

## 2025-02-15 RX ADMIN — METOPROLOL SUCCINATE 50 MG: 50 TABLET, EXTENDED RELEASE ORAL at 07:52

## 2025-02-15 ASSESSMENT — ACTIVITIES OF DAILY LIVING (ADL)
ADLS_ACUITY_SCORE: 66
ADLS_ACUITY_SCORE: 66
ORAL_HYGIENE: INDEPENDENT
ADLS_ACUITY_SCORE: 66
HYGIENE/GROOMING: INDEPENDENT
ADLS_ACUITY_SCORE: 76
ADLS_ACUITY_SCORE: 76
ORAL_HYGIENE: INDEPENDENT
ADLS_ACUITY_SCORE: 76
ADLS_ACUITY_SCORE: 76
ADLS_ACUITY_SCORE: 66
ADLS_ACUITY_SCORE: 66
ADLS_ACUITY_SCORE: 76
ADLS_ACUITY_SCORE: 66
DRESS: INDEPENDENT
ADLS_ACUITY_SCORE: 66
ADLS_ACUITY_SCORE: 76
DRESS: STREET CLOTHES;INDEPENDENT
LAUNDRY: WITH SUPERVISION
ADLS_ACUITY_SCORE: 76
ADLS_ACUITY_SCORE: 66
ADLS_ACUITY_SCORE: 76
ADLS_ACUITY_SCORE: 66
ADLS_ACUITY_SCORE: 76
LAUNDRY: WITH SUPERVISION
HYGIENE/GROOMING: INDEPENDENT
ADLS_ACUITY_SCORE: 76

## 2025-02-15 NOTE — PLAN OF CARE
Problem: Psychotic Signs/Symptoms  Goal: Improved Behavioral Control (Psychotic Signs/Symptoms)  Outcome: Progressing     Problem: Pain Acute  Goal: Optimal Pain Control and Function  Outcome: Progressing  Intervention: Prevent or Manage Pain  Recent Flowsheet Documentation  Taken 2/15/2025 1616 by Tamie Fraser RN  Sensory Stimulation Regulation: quiet environment promoted     Problem: Anxiety  Goal: Anxiety Reduction or Resolution  Outcome: Progressing   Goal Outcome Evaluation:    Pt was visible in the milieu watching TV and socializing with select peers.Affect flat but pleasant upon assessments. Pt denied all mental health and psych symptoms. Po intake adequate. Hygiene adequate. Pt was compliant with medications and contracted for safety. No behavioral concerns.

## 2025-02-15 NOTE — PLAN OF CARE
Problem: Psychotic Signs/Symptoms  Goal: Improved Behavioral Control (Psychotic Signs/Symptoms)  Outcome: Progressing     Problem: Anxiety  Goal: Anxiety Reduction or Resolution  Outcome: Progressing     Problem: Manic or Hypomanic Signs/Symptoms  Goal: Improved Impulse Control (Manic/Hypomanic Signs/Symptoms)  Outcome: Progressing     Problem: Pain Acute  Goal: Optimal Pain Control and Function  Outcome: Progressing  Intervention: Prevent or Manage Pain  Recent Flowsheet Documentation  Taken 2/14/2025 1815 by Tamie Fraser RN  Sensory Stimulation Regulation: quiet environment promoted   Goal Outcome Evaluation:     Pt was visible in the  milieu watching TV with select peers. Pt was wandering in and out of other pt rooms. He was easily redirected. Food and fluid intake adequate. Hygiene is poor. Pt was compliant with medications, contracted for safety. No behavioral outburst.

## 2025-02-15 NOTE — PLAN OF CARE
Pt has been mostly visible in the milieu, watching TV in the lounge. He is intermittently irritable but redirectable. Pt denies all mental health symptoms upon assessment. He's still confused at times but has mostly been able to verbalize his needs. Pt is med compliant, received PRN eye drops. Pt took a shower this shift with extensive encouragement. Intake is adequate. No other concerns at this time.  Problem: Psychotic Signs/Symptoms  Goal: Improved Mood Symptoms (Psychotic Signs/Symptoms)  Outcome: Progressing   Goal Outcome Evaluation:    Plan of Care Reviewed With: patient

## 2025-02-15 NOTE — PLAN OF CARE
"  Problem: Sleep Disturbance  Goal: Adequate Sleep/Rest  Outcome: Not Progressing   Patient affect flat and blunted, mood labile. Patient came out of his room started yelling in the hallway asking \"where is my truck, my $73,000 truck\". DARREN team was called. Patient was very argumentative, agitated to the staff. The patient is very confused, went in his room. Pacing the hallway. Patient goes in and out of his room. Will continue to monitor the patient and notify MD with any issues. Patient had 3.75 hours of sleep this shift.  "

## 2025-02-16 PROCEDURE — 124N000002 HC R&B MH UMMC

## 2025-02-16 PROCEDURE — 250N000013 HC RX MED GY IP 250 OP 250 PS 637

## 2025-02-16 RX ADMIN — Medication 25 MG: at 20:13

## 2025-02-16 RX ADMIN — CLONIDINE HYDROCHLORIDE 0.1 MG: 0.1 TABLET ORAL at 20:13

## 2025-02-16 RX ADMIN — MELATONIN TAB 3 MG 3 MG: 3 TAB at 20:12

## 2025-02-16 RX ADMIN — CLONIDINE HYDROCHLORIDE 0.1 MG: 0.1 TABLET ORAL at 08:11

## 2025-02-16 RX ADMIN — ATORVASTATIN CALCIUM 40 MG: 20 TABLET, FILM COATED ORAL at 08:11

## 2025-02-16 RX ADMIN — SENNOSIDES AND DOCUSATE SODIUM 1 TABLET: 50; 8.6 TABLET ORAL at 08:11

## 2025-02-16 RX ADMIN — CINACALCET 30 MG: 30 TABLET ORAL at 08:11

## 2025-02-16 RX ADMIN — FUROSEMIDE 40 MG: 40 TABLET ORAL at 08:11

## 2025-02-16 RX ADMIN — Medication 1 PATCH: at 08:11

## 2025-02-16 RX ADMIN — LISINOPRIL 40 MG: 10 TABLET ORAL at 08:11

## 2025-02-16 RX ADMIN — AMLODIPINE BESYLATE 10 MG: 5 TABLET ORAL at 08:11

## 2025-02-16 RX ADMIN — METOPROLOL SUCCINATE 50 MG: 50 TABLET, EXTENDED RELEASE ORAL at 08:11

## 2025-02-16 ASSESSMENT — ACTIVITIES OF DAILY LIVING (ADL)
ADLS_ACUITY_SCORE: 66
DRESS: INDEPENDENT
ADLS_ACUITY_SCORE: 66
ADLS_ACUITY_SCORE: 66
ORAL_HYGIENE: INDEPENDENT
ADLS_ACUITY_SCORE: 66
DRESS: STREET CLOTHES;INDEPENDENT
ADLS_ACUITY_SCORE: 66
HYGIENE/GROOMING: INDEPENDENT
ADLS_ACUITY_SCORE: 66
LAUNDRY: WITH SUPERVISION
ADLS_ACUITY_SCORE: 66
HYGIENE/GROOMING: INDEPENDENT
ORAL_HYGIENE: INDEPENDENT
ADLS_ACUITY_SCORE: 66
LAUNDRY: WITH SUPERVISION
ADLS_ACUITY_SCORE: 66

## 2025-02-16 NOTE — PLAN OF CARE
Problem: Sleep Disturbance  Goal: Adequate Sleep/Rest  Outcome: Not Progressing   Patient slept better compared from last night, had 4.5 hours of sleep. Patient calmer this shift no behavioral issues so far. Patient no complained of pain and discomfort. Will continue with current plan of care. Notify MD with any concerns.

## 2025-02-16 NOTE — PLAN OF CARE
Problem: Psychotic Signs/Symptoms  Goal: Improved Behavioral Control (Psychotic Signs/Symptoms)  Outcome: Progressing     Problem: Anxiety  Goal: Anxiety Reduction or Resolution  Outcome: Progressing   Goal Outcome Evaluation:  Pt was visible in the milieu watching TV dosing on and off. Mood calm and cooperative. Denies all mental health and psych symptoms. Po intake inadequate. Pt ate less than 25% of his meal. Pt was cooperative, compliant with medications and contracted for safety. No behavioral concerns.

## 2025-02-16 NOTE — PLAN OF CARE
Pt has been visible in the lounge, watching tv. He has been in a pleasant mood today, has not had any behavioral concerns. Pt continues to present as confused with impaired concentration. Denies all mental health symptoms upon assessment. Pt is med compliant, no PRNs received. He spoke to two of his daughters on the phone. Intake and hygiene are adequate, no other concerns at this time.    Problem: Psychotic Signs/Symptoms  Goal: Improved Behavioral Control (Psychotic Signs/Symptoms)  Outcome: Progressing   Goal Outcome Evaluation:    Plan of Care Reviewed With: patient

## 2025-02-17 PROCEDURE — 99231 SBSQ HOSP IP/OBS SF/LOW 25: CPT | Mod: GC | Performed by: PSYCHIATRY & NEUROLOGY

## 2025-02-17 PROCEDURE — 250N000013 HC RX MED GY IP 250 OP 250 PS 637

## 2025-02-17 PROCEDURE — 124N000002 HC R&B MH UMMC

## 2025-02-17 RX ADMIN — LISINOPRIL 40 MG: 10 TABLET ORAL at 07:56

## 2025-02-17 RX ADMIN — Medication 25 MG: at 20:44

## 2025-02-17 RX ADMIN — METOPROLOL SUCCINATE 50 MG: 50 TABLET, EXTENDED RELEASE ORAL at 07:57

## 2025-02-17 RX ADMIN — Medication 1 PATCH: at 07:57

## 2025-02-17 RX ADMIN — OLANZAPINE 5 MG: 5 TABLET, FILM COATED ORAL at 10:24

## 2025-02-17 RX ADMIN — AMLODIPINE BESYLATE 10 MG: 5 TABLET ORAL at 07:56

## 2025-02-17 RX ADMIN — CINACALCET 30 MG: 30 TABLET ORAL at 07:57

## 2025-02-17 RX ADMIN — SENNOSIDES AND DOCUSATE SODIUM 1 TABLET: 50; 8.6 TABLET ORAL at 07:56

## 2025-02-17 RX ADMIN — ACETAMINOPHEN 650 MG: 325 TABLET, FILM COATED ORAL at 07:57

## 2025-02-17 RX ADMIN — MELATONIN TAB 3 MG 3 MG: 3 TAB at 20:45

## 2025-02-17 RX ADMIN — ATORVASTATIN CALCIUM 40 MG: 20 TABLET, FILM COATED ORAL at 07:56

## 2025-02-17 RX ADMIN — FUROSEMIDE 40 MG: 40 TABLET ORAL at 07:56

## 2025-02-17 RX ADMIN — CLONIDINE HYDROCHLORIDE 0.1 MG: 0.1 TABLET ORAL at 20:44

## 2025-02-17 RX ADMIN — CLONIDINE HYDROCHLORIDE 0.1 MG: 0.1 TABLET ORAL at 07:57

## 2025-02-17 ASSESSMENT — ACTIVITIES OF DAILY LIVING (ADL)
HYGIENE/GROOMING: INDEPENDENT
ADLS_ACUITY_SCORE: 77
ADLS_ACUITY_SCORE: 66
ADLS_ACUITY_SCORE: 66
ORAL_HYGIENE: INDEPENDENT
ADLS_ACUITY_SCORE: 66
DRESS: INDEPENDENT
ADLS_ACUITY_SCORE: 66
ADLS_ACUITY_SCORE: 77
LAUNDRY: WITH SUPERVISION
ADLS_ACUITY_SCORE: 66
ADLS_ACUITY_SCORE: 77
DRESS: INDEPENDENT
ADLS_ACUITY_SCORE: 66
ADLS_ACUITY_SCORE: 77
ADLS_ACUITY_SCORE: 66
LAUNDRY: WITH SUPERVISION
ADLS_ACUITY_SCORE: 66
ADLS_ACUITY_SCORE: 66
ORAL_HYGIENE: INDEPENDENT
ADLS_ACUITY_SCORE: 66
ADLS_ACUITY_SCORE: 66
HYGIENE/GROOMING: INDEPENDENT
ADLS_ACUITY_SCORE: 77

## 2025-02-17 NOTE — PROGRESS NOTES
----------------------------------------------------------------------------------------------------------  [facility]  Psychiatry Progress Note  Hospital Day #128     Interim History:     The patient's care was discussed with the treatment team and chart notes were reviewed.    Identifier: Estevan Aaron is a 64 year old male with previous psychiatric diagnoses of  generalized anxiety disorder, Neurocognitive disorder, admitted from the ED 10/12/2024 due to concern for HI and psychosis, which now have resolved, in the psychosocial stressors (recent divorce).     Sleep: 3.25 hours (02/17/25 0700)  Scheduled medications: Took all scheduled medications as prescribed  Psychiatric PRN medications:  none       Staff Report:   Patient sleeps on and off this shift. No complaints of pain and discomfort. Patient continues on q15 minutes safety checks,slightly agitated, easy redirectable. Will continue to monitor the patient and provide therapeutic intervention as needed. Will continue with current plan of care. Notify MD with any concerns. The patient had 3.25 total hours of sleep this shift.      Subjective:     Patient Interview:  Santos was interviewed in the hallway. He was angry and said that because some one is smoking close to his room. Was unable to discuss more about that. Stated that he has slept well and has been feeling well.     ROS:  Patient denies acute concerns apart from knee pain noted above.     Objective:     Vitals:  /80   Pulse 60   Temp 98.1  F (36.7  C)   Resp 18   Wt 114.1 kg (251 lb 9.6 oz)   SpO2 100%   BMI 40.61 kg/m      Allergies:  No Known Allergies    Current Medications:  Scheduled:  Current Facility-Administered Medications   Medication Dose Route Frequency Provider Last Rate Last Admin    acetaminophen (TYLENOL) tablet 650 mg  650 mg Oral Q4H PRN Nikki Caceres MD   650 mg at 02/17/25 0757    alum & mag hydroxide-simethicone  (MAALOX) suspension 30 mL  30 mL Oral Q4H PRN Nikki Caceres MD   30 mL at 10/17/24 0837    amLODIPine (NORVASC) tablet 10 mg  10 mg Oral Daily Presley Barrera MD   10 mg at 02/17/25 0756    atorvastatin (LIPITOR) tablet 40 mg  40 mg Oral Daily Nikki Caceres MD   40 mg at 02/17/25 0756    cholecalciferol (VITAMIN D3) capsule 1,250 mcg  1,250 mcg Oral Q7 Days Evelia Grimm DO   1,250 mcg at 02/11/25 1127    cinacalcet (SENSIPAR) tablet 30 mg  30 mg Oral Daily Presley Barrera MD   30 mg at 02/17/25 0757    cloNIDine (CATAPRES) tablet 0.1 mg  0.1 mg Oral BID Presley Barrera MD   0.1 mg at 02/17/25 0757    diclofenac (VOLTAREN) 1 % topical gel 2 g  2 g Topical 4x Daily PRN Rocío Lopez MD   2 g at 12/22/24 2032    furosemide (LASIX) tablet 40 mg  40 mg Oral Daily Presley Barrera MD   40 mg at 02/17/25 0756    gabapentin (NEURONTIN) capsule 100 mg  100 mg Oral Q6H PRN Nikki Caceres MD   100 mg at 12/24/24 0046    hydrocortisone (CORTAID) 0.5 % cream   Topical Daily PRN Savi Chamorro MD        Lidocaine (LIDOCARE) 4 % Patch 1 patch  1 patch Transdermal Q24H PRN Rocío Lopez MD   1 patch at 12/22/24 2023    lisinopril (ZESTRIL) tablet 40 mg  40 mg Oral Daily Presley Barrera MD   40 mg at 02/17/25 0756    melatonin tablet 3 mg  3 mg Oral At Bedtime Presley Barrera MD   3 mg at 02/16/25 2012    metoprolol succinate ER (TOPROL XL) 24 hr tablet 50 mg  50 mg Oral Daily Presley Barrera MD   50 mg at 02/17/25 0757    nicotine (NICODERM CQ) 14 MG/24HR 24 hr patch 1 patch  1 patch Transdermal Daily Evelia Grimm DO   1 patch at 02/17/25 0757    nicotine (NICORETTE) gum 2 mg  2 mg Buccal Q1H PRN González Garcia MD   2 mg at 10/24/24 1254    OLANZapine (zyPREXA) tablet 5 mg  5 mg Oral TID PRN Evelia Grimm DO   5 mg at 02/17/25 1024    Or    OLANZapine (zyPREXA) injection 5 mg  5 mg Intramuscular TID PRN Evelia Grimm DO        polyethylene glycol (MIRALAX) Packet 17 g   17 g Oral Daily PRN Nikki Caceres MD        senna-docusate (SENOKOT-S/PERICOLACE) 8.6-50 MG per tablet 1 tablet  1 tablet Oral Daily Evelia Grimm DO   1 tablet at 02/17/25 0756    sodium chloride (DWAIN 128) 2 % ophthalmic solution 1 drop  1 drop Both Eyes Q3H PRN Kat Aguilar MD   1 drop at 02/15/25 1205    traZODone (DESYREL) half-tab 25 mg  25 mg Oral At Bedtime Rocío Lopez MD   25 mg at 02/16/25 2013    traZODone (DESYREL) half-tab 25 mg  25 mg Oral At Bedtime PRN Evelia Grimm DO   25 mg at 12/24/24 0046       PRN:  Current Facility-Administered Medications   Medication Dose Route Frequency Provider Last Rate Last Admin    acetaminophen (TYLENOL) tablet 650 mg  650 mg Oral Q4H PRN Nikki Caceres MD   650 mg at 02/17/25 0757    alum & mag hydroxide-simethicone (MAALOX) suspension 30 mL  30 mL Oral Q4H PRN Nikki Caceres MD   30 mL at 10/17/24 0837    amLODIPine (NORVASC) tablet 10 mg  10 mg Oral Daily Presley Barrera MD   10 mg at 02/17/25 0756    atorvastatin (LIPITOR) tablet 40 mg  40 mg Oral Daily Nikki Caceres MD   40 mg at 02/17/25 0756    cholecalciferol (VITAMIN D3) capsule 1,250 mcg  1,250 mcg Oral Q7 Days Evelia Grimm DO   1,250 mcg at 02/11/25 1127    cinacalcet (SENSIPAR) tablet 30 mg  30 mg Oral Daily Presley Barrera MD   30 mg at 02/17/25 0757    cloNIDine (CATAPRES) tablet 0.1 mg  0.1 mg Oral BID Presley Barrera MD   0.1 mg at 02/17/25 0757    diclofenac (VOLTAREN) 1 % topical gel 2 g  2 g Topical 4x Daily PRN Rocío Lopez MD   2 g at 12/22/24 2032    furosemide (LASIX) tablet 40 mg  40 mg Oral Daily Presley Barrera MD   40 mg at 02/17/25 0756    gabapentin (NEURONTIN) capsule 100 mg  100 mg Oral Q6H PRN Nikki Caceres MD   100 mg at 12/24/24 0046    hydrocortisone (CORTAID) 0.5 % cream   Topical Daily PRN Savi Chamorro MD        Lidocaine (LIDOCARE) 4 % Patch 1 patch  1 patch Transdermal Q24H PRN Salazar Juarez,  Rocío Evans MD   1 patch at 12/22/24 2023    lisinopril (ZESTRIL) tablet 40 mg  40 mg Oral Daily Presley Barrera MD   40 mg at 02/17/25 0756    melatonin tablet 3 mg  3 mg Oral At Bedtime Presley Barrera MD   3 mg at 02/16/25 2012    metoprolol succinate ER (TOPROL XL) 24 hr tablet 50 mg  50 mg Oral Daily Presley Barrera MD   50 mg at 02/17/25 0757    nicotine (NICODERM CQ) 14 MG/24HR 24 hr patch 1 patch  1 patch Transdermal Daily Evelia Grimm DO   1 patch at 02/17/25 0757    nicotine (NICORETTE) gum 2 mg  2 mg Buccal Q1H PRN González Garcia MD   2 mg at 10/24/24 1254    OLANZapine (zyPREXA) tablet 5 mg  5 mg Oral TID PRN Evelia Grimm DO   5 mg at 02/17/25 1024    Or    OLANZapine (zyPREXA) injection 5 mg  5 mg Intramuscular TID PRN Evelia Grimm DO        polyethylene glycol (MIRALAX) Packet 17 g  17 g Oral Daily PRN Nikki Caceres MD        senna-docusate (SENOKOT-S/PERICOLACE) 8.6-50 MG per tablet 1 tablet  1 tablet Oral Daily Evelia Grimm DO   1 tablet at 02/17/25 0756    sodium chloride (DWAIN 128) 2 % ophthalmic solution 1 drop  1 drop Both Eyes Q3H PRN Kat Aguilar MD   1 drop at 02/15/25 1205    traZODone (DESYREL) half-tab 25 mg  25 mg Oral At Bedtime Rocío Lopez MD   25 mg at 02/16/25 2013    traZODone (DESYREL) half-tab 25 mg  25 mg Oral At Bedtime PRN Evelia Grimm DO   25 mg at 12/24/24 0046     Labs and Imaging:  Data this admission:  - CBC unremarkable  - CMP unremarkable  - TSH normal  - UDS negative  - Vit D low  - Hgb A1c 5.9 (10/13/24)  - Lipids unremarkable  - Vit B12 normal  - Folate normal  - Urinalysis unremarkable  - EKG normal sinus rhythm, QTc 390 ms  - Head CT showed no acute changes  - HIV non reactive  - Treponema antibody non reactive  - ESR wnl   - Ceruloplasmin wnl   - BOOGIE negative  - Lyme negative      Parathyroid hormone:   85 (10/13)     Calcium:  11.4 (10/14) -> 10.3 (10/16) -> 9.8 (10/19) -> 10.6 (10/21) -> 10.3 (10/23)     Albumin:  4.3  "(10/14) -->  4.3 (10/16) -> 4.1 (10/17) -> 4.2 (10/19) -> 4.5 (10/21) -> 4.3 (10/23)      Mental Status Exam:     Oriented to:  Person/Self  General:  Alert, eating in the lounge  Appearance:  Appears stated age, dressed in own clothing, overweight, grooming adequate with reported shower today  Behavior/Attitude: Cooperative, irritable   Eye Contact: Appropriate  Psychomotor: Normal and No evidence of tics, dystonia, or tardive dyskinesia  no catatonia present  Speech:  loud volume/tone  Language: Fluent in English with appropriate syntax and vocabulary.  Mood:  \"Fine\"  Affect:   appropriate and congruent with mood  Thought Process:   concrete, disorganized, confused  Thought Content:   No SI/HI/AH/VH;  No apparent delusions today  Associations:  loose  Insight:  impaired due to neurocognitive disorder   Judgment:  impaired due to neurocognitive disorder  Impulse control: impaired  Attention Span:  inattentive, limited  Concentration:   impaired by neurocognitive disorder  Recent and Remote Memory:   remote memory intact, recent memory impaired  Fund of Knowledge: average  Muscle Strength and Tone: normal  Gait and Station: Normal      Psychiatric Assessment     Estevan Aaron is a 64 year old male with previous psychiatric diagnoses of GEORGINA admitted from the ER on 10/12/2024 due to concern for HI and psychosis in the context of medical issues (hyperparathyroidism, hypercalcemia), psychosocial stressors including with recent divorce and selling his home. This is the patient's first psychiatric hospitalization. The MSE on admission was pertinent for a thought process which was perseverative, circumstantial, tangential, disorganized and tangential; with rambling and looseness of associations. Psychological contributions to presentation included lack of insight. Social factors contributing to presentation included isolation, recent divorce, selling his house, and moving from hotel to hotel. Biological contributions to " presentation included a history of hyperparathyroidism with chronic hypercalcemia per charts as well as a history of methamphetamine use per collateral from ex-wife.     Per collateral from ex-wife, Santos has had paranoid ideations since they first met. He has always felt like people are out to get him or trying to rip him off. She says that he has had visual hallucinations (he will point things out that the rest of the family can't see) for a long time, but the family would just go along with him to avoid making him angry. This timeline and presentation could be consistent with diagnosis of paranoid personality disorder. Things began to worsen around the beginning of the COVID pandemic, when Santos became more isolated and the family started to notice cognitive issues such as impaired memory. Other things that could be contributing to his presentation is hyperparathyroidism with hypercalcemia. However, patient's calcium returned to normal limits on 10/16, and patient continues to have disorganized behavior unrelated to calcium elevation, so likely this is not a significant contributing factor to patient's presentation.      Currently, Santos is at times disoriented, but easily re-directable. Presents with some difficulty of word articulation, which contributes to his frustration when interacting with psych team, as he finds it difficult to explain himself. He occasionally has struggled to attend to ADLS particularly bathing and required frequent encouragement, likely due to underlying disorganized behavior. Overall, he is mostly independent in the unit. His confused behavior tends to increase in the evenings, seemingly related to a Neurocognitive Disorder diagnosis, and this could evolve to be his new baseline. He does not present as a danger to himself or others so his SIO was discontinued. Santos does not present with any new episodes of physical agitation and is able to attend groups and interact with peers without major  altercations. Patient currently is stable with current neuroleptic doses in regards to improved paranoia. However, neuroleptics could be worsening his brain fog and may benefit from a down titration as a trial while awaiting placement. He is doing well with a reduction of Zyprexa and the plan is to discontinue it week of 2/10 before expected discharge on 2/24. Due to inability to care for self outside a supportive sxs, would benefit from continued hospital stay with discharge to group home/nursing home when available.          Psychiatric Plan by Diagnosis     Today's changes:  - no changes     # Major Neurocognitive Disorder  1. Medications:  - Trazodone 25 mg HS  - PRN Zyprexa 5 mg TID     3. Additional Plans:  - Patient will be treated in therapeutic milieu with appropriate individual and group therapies as described  - Pending memory facility placement.      # Unspecified anxiety vs Generalized Anxiety Disorder  - Monitor for symptoms.  - Fluoxetine held due to suspicion of ongoing manic symptoms     Psychiatric Hospital Course:      Estevan Aaron was admitted to [facility] on a 72 hour hold.   Medications:  PTA fluoxetine was held due to concern for worsening of zelalem   New medications started at the time of admission include Zyprexa.   Increased olanzapine 10 mg at bedtime was to 10 mg BID (10/14)   Increased olanzapine 10 mg BID to 10 mg during day and 15 mg at bedtime (10/15)  10/21: increased olanzapine pm dose from 15 to 20 mg  10/23: started melatonin 3 mg at 7 pm to help patient with circadian rhythm as he has been staying up throughout the night and sleeping a lot during the day and there is some concern for delirium   10/24: consulted anesthesia for MRI brain   10/28: MRI brain complete, decrease AM olanzapine from 10 to 5 mg  10/29: Morning olanzapine decreased to 2.5 mg, evening olanzapine decreased to 15 mg   11/1: Decreased evening olanzapine to 10 mg   11/4: Decreased evening olanzapine to 7.5 mg    11/5: Decreased evening olanzapine to 5 mg   11/11: Moved morning dose of olanzapine to the afternoon as patient appears more agitated in the afternoons/evenings  11/21: Started memantine 5 mg daily   11/26: Discontinued olanzapine 2.5 mg during the afternoon  12/2:   Discontinued memantine 5 mg, daily  2/5: as psychosis has improved with less paranoia, it was thought patient could benefit from a decrease in Zyprexa as it could be worsening is cognitive sxs and given black box warning for his age category. Decreased Zyprexa to 2.5mg at bedtime as a trial. Can restart if psychosis worsens. Seems to be tolerating this well.   2/10: Discontinued Zyprexa    The risks, benefits, alternatives, and side effects were discussed and understood by the patient and other caregivers.     Medical Assessment and Plan     Medical diagnoses to be addressed this admission:    # Hip Pain  - New right hip pain, and mild pain in multiple joints. Moderate response to topical antiinflammatory gel and lidocaine patch.   - Medicine consulted for recommendations 12/20    # CHF  # Hypertension  - Continue PTA medications  Furosemide 40 mg daily  Lisinopril 40 mg daily  Metoprolol 50 mg daily  Amlodipine 10 mg daily  Clonidine 0.1 mg BID    # Hyperlipidemia  - Continue PTA Atorvastatin 40 mg     # Primary Hyperparathyroidism  # Hypercalcemia, hypophosphoremia   Increased Ca level to 10.9 and decreased Ph level to 2.3 in the ED   - Consulted endocrinology, who started cinacalcet 30 mg BID on 10/13  - 10/16 endocrinology recommended continuing to trend calcium and albumin to make sure patient does not become hypocalcemic and recommended holding cinacalcet   - 10/17, calcium and albumin wnl, endo recommended cinacalcet 30 mg once daily   - 10/21, per endo continue cincaclcet and recheck calcium and albumin on October 24  - 10/23: per endo, patient needs to follow up outpatient for further management of hyperparathyroidism     # Rhinorrhea  1/15  currently multiple COVID infections on unit. No other symptoms of COVID noted. COVID PCR - on 1/13.    #Chipped tooth: Pt chipped a molar at dinner on 2/4, denied pain. No bleeding.  - would benefit from outpt dental follow up.     Medical course: Patient was physically examined by the ED prior to being transferred to the unit and was found to be medically stable and appropriate for admission.      Consults: Psychiatry, Endocrinology (follow-up of hyperparathyroidism / hypercalcemia and hypertension), neurology     Checklist     Legal Status: Committed   [place] meds: Haldol, Clozaril, Risperdal, Invega, Zyprexa, Seroquel, Abilify    Safety Assessment:   Behavioral Orders   Procedures    Code 1 - Restrict to Unit    Routine Programming     As clinically indicated    Status 15     Every 15 minutes.    Status Individual Observation     Patient SIO status reviewed with team/RN.  Please also refer to RN/team documentation for add'l detail.    -SIO staff to monitor following which have contributed to patient being on SIO:  Patient intrusiveness to other patients  -Possible interventions SIO staff could use to support patient's treatment progress:  Redirection  -When following observed, team will review discontinuation of SIO:  Improvement in intrusive behavior.     Order Specific Question:   CONTINUOUS 24 hours / day     Answer:   Other     Order Specific Question:   Specify distance     Answer:   10 feet     Order Specific Question:   Indications for SIO     Answer:   Severe intrusiveness    Status Individual Observation     Patient SIO status reviewed with team/RN.  Please also refer to RN/team documentation for add'l detail.    -SIO staff to monitor following which have contributed to patient being on SIO:  Being intrusive, vulnurable   -Possible interventions SIO staff could use to support patient's treatment progress:  Redirection, avoiding conflict with other pts  -When following observed, team will review  discontinuation of SIO:  Pt is calm     Order Specific Question:   CONTINUOUS 24 hours / day     Answer:   Other     Order Specific Question:   Specify distance     Answer:   10 feet     Order Specific Question:   Indications for SIO     Answer:   Severe intrusiveness       Risk Assessment:  Risk for harm is low  Risk factors: impulsive and past behaviors  Protective factors: family      SIO: Yes, foe severe intrusiveness and being vulnurable    Disposition: Pending transfer to memory care facility.  Accepted to [facility] for 2/24.      Attestations   Sherrie Cosby MD  PGY1 Psychiatry Resident    Attestation:  This patient has been seen and evaluated by me, Pete Smith MD.  I have discussed this patient with the house staff team including the resident and/or medical student and I agree with the findings and plan in this note.    I have reviewed today's vital signs, medications, labs and imaging. Pete Smith MD , PhD.

## 2025-02-17 NOTE — PLAN OF CARE
Problem: Sleep Disturbance  Goal: Adequate Sleep/Rest  2/17/2025 0624 by Laila Lucero, RN  Outcome: Progressing    Patient sleeps on and off this shift. No complaints of pain and discomfort. Patient continues on q15 minutes safety checks,slightly agitated, easy redirectable. Will continue to monitor the patient and provide therapeutic intervention as needed. Will continue with current plan of care. Notify MD with any concerns. The patient had 3.25 total hours of sleep this shift.

## 2025-02-17 NOTE — PLAN OF CARE
BEH IP Unit Acuity Rating Score (UARS)  Patient is given one point for every criteria they meet.    CRITERIA SCORING   On a 72 hour hold, court hold, committed, stay of commitment, or revocation. 1    Patient LOS on BEH unit exceeds 20 days. 1  LOS: 128   Patient under guardianship, 55+, otherwise medically complex, or under age 11. 1   Suicide ideation without relief of precipitating factors. 0   Current plan for suicide. 0   Current plan for homicide. 0   Imminent risk or actual attempt to seriously harm another without relief of factors precipitating the attempt. 1   Severe dysfunction in daily living (ex: complete neglect for self care, extreme disruption in vegetative function, extreme deterioration in social interactions). 1   Recent (last 7 days) or current physical aggression in the ED or on unit. 0   Restraints or seclusion episode in past 72 hours. 0   Recent (last 7 days) or current verbal aggression, agitation, yelling, etc., while in the ED or unit. 0   Active psychosis. 1   Need for constant or near constant redirection (from leaving, from others, etc).  0   Intrusive or disruptive behaviors. 0   Patient requires 3 or more hours of individualized nursing care per 8-hour shift (i.e. for ADLs, meds, therapeutic interventions). 0   TOTAL 6

## 2025-02-17 NOTE — PLAN OF CARE
Team Note Due:  Monday    Assessment/Intervention/Current Symtoms and Care Coordination:  Chart review and met with team, discussed pt progress, symptomology, and response to treatment.  Discussed the discharge plan and any potential impediments to discharge. Clifford Aaron is emergency guardian/conservator until 02/27/2025. A petition for permanent guardianship/conservatorship has been filed and a hearing is scheduled for 2/27/25.    Discharge Plan or Goal:  Memory care facility     Barriers to Discharge:  Patient requires further psychiatric stabilization due to current symptomology, medication management with changes subject to provider, coordination with outside supports, and aftercare planning. Pt is under civil commitment.     Referral Status:  Memory Care approved:  [facility]. Tour completed 11/30. Per Clifford on 1/8, she would like to move forward with a referral. Tentative move in date is 2/24/25.  Vera (Exec Director): [phone]      Memory Care facilities currently in process:  [facility]. Tour completed 11/27.  [facility]. Tour scheduled 1/8/25. Have immediate openings and will accept EW. Family not requesting referral yet.  [facility]. Per Clifford on 1/20, she would like to move forward with a referral. Records sent 1/20.  [email or web address]    Memory Care facilities pending family review:  [facility].  [facility].  [facility].  [facility].  [facility].    Memory Care facilities declined:  [facility] (private pay only, no EW).  [facility] [address] (private pay only, no EW).  [facility] (no openings).  [facility]. Tour completed 11/29. Records sent 12/2/24. Declined 12/19/24 (don't feel they are an appropriate facility for pt's needs).  Lorraine (marketing  director): [email or web address]; [phone]  Isatu (director of nursing): [email or web address]  [facility].  Denied 12/26 (concerned about dementia with psychosis, history of hallucinations, high elopement risk, still on 1:1).  Nikki Noel: [email or web address]; Office [phone], Fax [phone]   [facility]. Not accepting EW as of 1/7/25.    Legal Status:  [place] Commitment with [facility]  File Number: [id number]  Start and expiration date of commitment: 10/24/24 - 04/24/25    [facility] meds: Haldol, Clozaril, Risperdal, Invega, Zyprexa, Seroquel, Abilify    PPS/CM:  Shelby Chowdary: [phone]  [email or web address].    Contacts:  Maria C Aaron (Daughter): [phone]   Clifford Aaron (ex-wife): [phone]     No Del Angel (guardianship/conservatorship ): [phone]  [email or web address]    Caro Ross ([facility] [place] [place] probate court visitor for guardianship): [phone]  [email or web address]    Derrell Duff ([employer] ): [phone]  [email or web address]     Upcoming Meetings and Dates/Important Information and next steps:  Schedule ambulance transport when discharge is confirmed  Send discharge summary to [facility]  Clarify if they want pt discharged with meds or if meds should be sent to an outside pharmacy  Send PD/Discharge Summary to CM  Send PD/COS to [facility] [place] [place]    Provisional Discharge and Change of Status needed at discharge

## 2025-02-17 NOTE — PLAN OF CARE
Pt more agitated today r/t disagreement with a peer. Pt presents as confused, thought the peer was jealous of him being a . Pt began posturing towards peer, staff able to redirect without further escalation. Pt having a difficult time following directions to stay in his room and was knocking on pt's doors. SIO 1:1 added temporarily to ensure safety. Pt med compliant, received PRN zyprexa for agitation with some relief. Pt sleeping in lounge for the rest of the afternoon.     Problem: Psychotic Signs/Symptoms  Goal: Improved Mood Symptoms (Psychotic Signs/Symptoms)  Outcome: Not Progressing   Goal Outcome Evaluation:    Plan of Care Reviewed With: patient

## 2025-02-17 NOTE — PROVIDER NOTIFICATION
02/17/25 0917   Individualization/Patient Specific Goals   Patient Personal Strengths expressive of emotions;expressive of needs;family/social support   Patient Vulnerabilities housing insecurity;lacks insight into illness;poor impulse control   Interprofessional Rounds   Summary Discussed patient progress and pending discharge to [facility] on 2/24. Pt has been stable since decreasing Zyprexa.   Participants nursing;psychiatrist;CTC;other (see comments)   Behavioral Team Discussion   Participants Dr. Smith; Dr. Cosby; Adilia Louis RN; Kylee Becerra [facility]; medical students   Progress Minimal   Anticipated length of stay 60+ days   Continued Stay Criteria/Rationale Placement pending   Medical/Physical See H&P   Precautions See below   Plan Psychiatric assessment/Medication management. Therapeutic Milieu. Individual care planning and after care planning. Patient to participate in unit groups and activities. Individual and group support on unit.   Safety Plan Completed   Anticipated Discharge Disposition another healthcare facility     PRECAUTIONS AND SAFETY    Behavioral Orders   Procedures    Code 1 - Restrict to Unit    Routine Programming     As clinically indicated    Status 15     Every 15 minutes.    Status Individual Observation     Patient SIO status reviewed with team/RN.  Please also refer to RN/team documentation for add'l detail.    -SIO staff to monitor following which have contributed to patient being on SIO:  Patient intrusiveness to other patients  -Possible interventions SIO staff could use to support patient's treatment progress:  Redirection  -When following observed, team will review discontinuation of SIO:  Improvement in intrusive behavior.     Order Specific Question:   CONTINUOUS 24 hours / day     Answer:   Other     Order Specific Question:   Specify distance     Answer:   10 feet     Order Specific Question:   Indications for SIO     Answer:   Severe intrusiveness        Safety  Safety WDL: WDL  Patient [facility]: [facility]  Observed Behavior: calm, sitting  Observed Behavior (Comment): Watching TV  Airway Safety Measures: other (see comments)  Safety Measures: safety rounds completed, environmental rounds completed  Diversional Activity: television  De-Escalation Techniques: appropriate behavior reinforced, verbally redirected  Suicidality: Status 15  Assault: status 15  Elopement Assessment: Loitering near exit doors, Statements about wanting to leave  Elopement Interventions: status 15  Sexual: status 15

## 2025-02-18 PROCEDURE — 250N000013 HC RX MED GY IP 250 OP 250 PS 637

## 2025-02-18 PROCEDURE — 124N000002 HC R&B MH UMMC

## 2025-02-18 PROCEDURE — 99231 SBSQ HOSP IP/OBS SF/LOW 25: CPT | Mod: GC | Performed by: PSYCHIATRY & NEUROLOGY

## 2025-02-18 PROCEDURE — 99254 IP/OBS CNSLTJ NEW/EST MOD 60: CPT

## 2025-02-18 RX ORDER — OLANZAPINE 2.5 MG/1
2.5 TABLET, FILM COATED ORAL AT BEDTIME
Status: DISCONTINUED | OUTPATIENT
Start: 2025-02-18 | End: 2025-02-18

## 2025-02-18 RX ORDER — OLANZAPINE 10 MG/2ML
5 INJECTION, POWDER, FOR SOLUTION INTRAMUSCULAR AT BEDTIME
Status: DISCONTINUED | OUTPATIENT
Start: 2025-02-18 | End: 2025-02-19

## 2025-02-18 RX ORDER — OLANZAPINE 2.5 MG/1
2.5 TABLET, FILM COATED ORAL AT BEDTIME
Status: DISCONTINUED | OUTPATIENT
Start: 2025-02-18 | End: 2025-02-19

## 2025-02-18 RX ADMIN — FUROSEMIDE 40 MG: 40 TABLET ORAL at 08:28

## 2025-02-18 RX ADMIN — CLONIDINE HYDROCHLORIDE 0.1 MG: 0.1 TABLET ORAL at 08:28

## 2025-02-18 RX ADMIN — LISINOPRIL 40 MG: 10 TABLET ORAL at 08:28

## 2025-02-18 RX ADMIN — OLANZAPINE 2.5 MG: 2.5 TABLET, FILM COATED ORAL at 22:04

## 2025-02-18 RX ADMIN — ATORVASTATIN CALCIUM 40 MG: 20 TABLET, FILM COATED ORAL at 08:28

## 2025-02-18 RX ADMIN — Medication 1250 MCG: at 10:10

## 2025-02-18 RX ADMIN — AMLODIPINE BESYLATE 10 MG: 5 TABLET ORAL at 08:28

## 2025-02-18 RX ADMIN — CINACALCET 30 MG: 30 TABLET ORAL at 08:28

## 2025-02-18 RX ADMIN — SENNOSIDES AND DOCUSATE SODIUM 1 TABLET: 50; 8.6 TABLET ORAL at 08:28

## 2025-02-18 RX ADMIN — METOPROLOL SUCCINATE 50 MG: 50 TABLET, EXTENDED RELEASE ORAL at 08:28

## 2025-02-18 RX ADMIN — Medication 1 PATCH: at 08:28

## 2025-02-18 ASSESSMENT — ACTIVITIES OF DAILY LIVING (ADL)
ADLS_ACUITY_SCORE: 87
DRESS: STREET CLOTHES;INDEPENDENT
ADLS_ACUITY_SCORE: 87
ADLS_ACUITY_SCORE: 77
ADLS_ACUITY_SCORE: 77
HYGIENE/GROOMING: PROMPTS;INDEPENDENT
ADLS_ACUITY_SCORE: 87
ADLS_ACUITY_SCORE: 87
ADLS_ACUITY_SCORE: 77
ADLS_ACUITY_SCORE: 87
ADLS_ACUITY_SCORE: 77
ADLS_ACUITY_SCORE: 87
HYGIENE/GROOMING: PROMPTS;INDEPENDENT
ADLS_ACUITY_SCORE: 87
ADLS_ACUITY_SCORE: 87
ORAL_HYGIENE: INDEPENDENT
ADLS_ACUITY_SCORE: 87
ADLS_ACUITY_SCORE: 87
ADLS_ACUITY_SCORE: 77
ADLS_ACUITY_SCORE: 87
ADLS_ACUITY_SCORE: 77
ORAL_HYGIENE: INDEPENDENT
LAUNDRY: WITH SUPERVISION
DRESS: STREET CLOTHES;INDEPENDENT
ADLS_ACUITY_SCORE: 87
ADLS_ACUITY_SCORE: 87
ADLS_ACUITY_SCORE: 77
ADLS_ACUITY_SCORE: 77

## 2025-02-18 NOTE — CONSULTS
"[facility]  Consult Note - Hospitalist Service  Date of Admission:  10/12/2024  Consult Requested by: Sherrie Cosby MD   Reason for Consult: \"Pt with worsening edema in feet with scar and dyspnea\"    Assessment & Plan   Estevan Aaron is a 64 year old male admitted on 10/12/2024. He has a medical history notable for hyperparathyroidism, hypertension, hyperlipidemia, tobacco use disorder, thalassemia.  Admitted by psychiatry for psychosis.     Bilateral lower extremity edema  See previous workup for this, consults dated 11/27, 12/4, and 12/21. Previous US venous doppler without DVT but did show a 3.8cm fluid collection in R popliteal fossa c/w Baker's cyst, however can be managed as OP. Santos says his lower leg edema is worse in the morning and gets better throughout the day. Declines wearing compression stockings or leg elevation. Previously declined lymphedema therapy, currently declines it again. Currently on lasix 40 mg daily. Current presentation likely d/t dependent edema.   - continue lasix 40 mg daily  - encourage use of compression stockings and leg elevation   - if edema worsens or does not improve, recommend patient follow up with PCP for further workup     Reported dyspnea  Patient reported to psychiatry earlier this AM that he was experiencing difficulty breathing. I presented to the unit and Santos declined feeling short of breath, denies cough and difficulty breathing. Declined auscultation exam.   - noted; continue to monitor     Reported wound to dorsal left foot  In discussion with psychiatry, patient noted to have sore on top of left foot that was tender to palpation. During my exam, dorsum of left foot is not tender however there is a small area of erythema noted. This area is not open. Santos said he previously had an open area there but that was awhile ago and it has since healed. He is not concerned about that area at this time.   - noted; continue to monitor "     Patient is stable at this time, Internal Medicine will sign off. Please reach out with any future questions or concerns.     Vivian Stephens DNP, [facility]  Internal Medicine [facility] Hospitalist  [place]      Clinically Significant Risk Factors                   # Hypertension: Noted on problem list                # Financial/Environmental Concerns:           Vivian Stephens NP  Hospitalist Service  Securely message with [employer] (more info)  Text page via [facility] Paging/Directory   ______________________________________________________________________    Chief Complaint   Lower extremity edema, dyspnea, scar     History is obtained from the patient and electronic health record    History of Present Illness   Estevan Aaron is a 64 year old male admitted on 10/12/2024. He has a medical history notable for hyperparathyroidism, hypertension, hyperlipidemia, tobacco use disorder, thalassemia.  Admitted by psychiatry for psychosis.     Santos was seen in the [facility]. Santos says his lower leg edema is worse in the morning and gets better throughout the day. Declines wearing compression stockings or leg elevation. Does not want lymphedema therapy. Additionally, Santos declined feeling short of breath, denies cough and difficulty breathing. In regards to left foot, Santos said he previously had an open area there but that was awhile ago and it has since healed. He is not concerned about that area at this time. All questions and concerns addressed at this time.       Past Medical History    Past Medical History:   Diagnosis Date    Kidney stone     Serum calcium elevated     Tobacco use disorder     tobacco quit line referral done 4/19/06       Past Surgical History   Past Surgical History:   Procedure Laterality Date    ANESTHESIA OUT OF OR MRI N/A 10/28/2024    Procedure: 1.5 MRI Brain;  Surgeon: GENERIC ANESTHESIA PROVIDER;  Location: [facility] OR    ROTATOR CUFF REPAIR RT/LT Right 2021       Medications   Current  Facility-Administered Medications   Medication Dose Route Frequency Provider Last Rate Last Admin    acetaminophen (TYLENOL) tablet 650 mg  650 mg Oral Q4H PRN Nikki Caceres MD   650 mg at 02/17/25 0757    alum & mag hydroxide-simethicone (MAALOX) suspension 30 mL  30 mL Oral Q4H PRN Nikki Caceres MD   30 mL at 10/17/24 0837    amLODIPine (NORVASC) tablet 10 mg  10 mg Oral Daily Presley Barrera MD   10 mg at 02/18/25 0828    atorvastatin (LIPITOR) tablet 40 mg  40 mg Oral Daily Nikki Caceres MD   40 mg at 02/18/25 0828    cholecalciferol (VITAMIN D3) capsule 1,250 mcg  1,250 mcg Oral Q7 Days Domingo GrimmjiDO   1,250 mcg at 02/18/25 1010    cinacalcet (SENSIPAR) tablet 30 mg  30 mg Oral Daily Presley Barrera MD   30 mg at 02/18/25 0828    cloNIDine (CATAPRES) tablet 0.1 mg  0.1 mg Oral BID Presley Barrera MD   0.1 mg at 02/18/25 0828    diclofenac (VOLTAREN) 1 % topical gel 2 g  2 g Topical 4x Daily PRN Rocío Lopez MD   2 g at 12/22/24 2032    furosemide (LASIX) tablet 40 mg  40 mg Oral Daily Presley Barrera MD   40 mg at 02/18/25 0828    gabapentin (NEURONTIN) capsule 100 mg  100 mg Oral Q6H PRN Nikki Caceres MD   100 mg at 12/24/24 0046    hydrocortisone (CORTAID) 0.5 % cream   Topical Daily PRN Savi Chamorro MD        Lidocaine (LIDOCARE) 4 % Patch 1 patch  1 patch Transdermal Q24H PRN Rocío Lopez MD   1 patch at 12/22/24 2023    lisinopril (ZESTRIL) tablet 40 mg  40 mg Oral Daily Presley Barrera MD   40 mg at 02/18/25 0828    melatonin tablet 3 mg  3 mg Oral At Bedtime Presley Barrera MD   3 mg at 02/17/25 2045    metoprolol succinate ER (TOPROL XL) 24 hr tablet 50 mg  50 mg Oral Daily Presley Barrera MD   50 mg at 02/18/25 0828    nicotine (NICODERM CQ) 14 MG/24HR 24 hr patch 1 patch  1 patch Transdermal Daily Evelia Grimm DO   1 patch at 02/18/25 0828    nicotine (NICORETTE) gum 2 mg  2 mg Buccal Q1H PRN González Garcia MD   2 mg at  10/24/24 1254    OLANZapine (zyPREXA) tablet 5 mg  5 mg Oral TID PRN Evelia Grimm DO   5 mg at 02/17/25 1024    Or    OLANZapine (zyPREXA) injection 5 mg  5 mg Intramuscular TID PRN Evelia Grimm,         OLANZapine (zyPREXA) tablet 2.5 mg  2.5 mg Oral At Bedtime Sherrie Cosby MD        polyethylene glycol (MIRALAX) Packet 17 g  17 g Oral Daily PRN Nikki Caceres MD        senna-docusate (SENOKOT-S/PERICOLACE) 8.6-50 MG per tablet 1 tablet  1 tablet Oral Daily Evelia Grimm DO   1 tablet at 02/18/25 0828    sodium chloride (DWAIN 128) 2 % ophthalmic solution 1 drop  1 drop Both Eyes Q3H PRN Kat Aguilar MD   1 drop at 02/15/25 1205    traZODone (DESYREL) half-tab 25 mg  25 mg Oral At Bedtime Rocío Lopez MD   25 mg at 02/17/25 2044    traZODone (DESYREL) half-tab 25 mg  25 mg Oral At Bedtime PRN Evelia Grimm DO   25 mg at 12/24/24 0046         Physical Exam   Vital Signs: Temp: 97.6  F (36.4  C) Temp src: Oral BP: (!) 148/88 Pulse: 62   Resp: 16 SpO2: 100 % O2 Device: None (Room air)    Weight: 251 lbs 9.6 oz    GENERAL: Alert and awake  CARDIOVASCULAR: appears well perfused   RESPIRATORY: Effort normal on RA. Declined auscultation.   MUSCULOSKELETAL: No joint swelling or tenderness. Moves all extremities.   EXTREMITIES: +2-3 lower extremity edema. Intact bilateral pedal pulses. No calf asymmetry, erythema, or tenderness.   NEUROLOGICAL: Moving all extremities symmetrically.   SKIN: Intact. Warm and dry. No jaundice. Small roughly circular area of erythema on dorsum of left foot, appears a previous recently healed wound     Medical Decision Making       20 MINUTES SPENT BY ME on the date of service doing chart review, history, exam, documentation & further activities per the note.      Data   Imaging results reviewed over the past 24 hrs:   No results found for this or any previous visit (from the past 24 hours).  No lab results found in last 7 days.

## 2025-02-18 NOTE — PLAN OF CARE
BEH IP Unit Acuity Rating Score (UARS)  Patient is given one point for every criteria they meet.    CRITERIA SCORING   On a 72 hour hold, court hold, committed, stay of commitment, or revocation. 1    Patient LOS on BEH unit exceeds 20 days. 1  LOS: 129   Patient under guardianship, 55+, otherwise medically complex, or under age 11. 1   Suicide ideation without relief of precipitating factors. 0   Current plan for suicide. 0   Current plan for homicide. 0   Imminent risk or actual attempt to seriously harm another without relief of factors precipitating the attempt. 1   Severe dysfunction in daily living (ex: complete neglect for self care, extreme disruption in vegetative function, extreme deterioration in social interactions). 1   Recent (last 7 days) or current physical aggression in the ED or on unit. 0   Restraints or seclusion episode in past 72 hours. 0   Recent (last 7 days) or current verbal aggression, agitation, yelling, etc., while in the ED or unit. 0   Active psychosis. 1   Need for constant or near constant redirection (from leaving, from others, etc).  0   Intrusive or disruptive behaviors. 0   Patient requires 3 or more hours of individualized nursing care per 8-hour shift (i.e. for ADLs, meds, therapeutic interventions). 0   TOTAL 6

## 2025-02-18 NOTE — PLAN OF CARE
At the beginning of the shift, the pt was observed sleeping in his room for an hour. Latter, the pt was visible in the milieu, watching TV and occasionally napping in the lounge. The pt displayed a calm mood, with a flat affect, and showed occasional intermittent confusion but was easily redirectable. There were no behavioral escalations or safety concerns noted or reported. The pt complied with HS med and care. The pt only displayed minimal agitation when code 21 was called on the unit and redirected peers to their room. SIO staff verbally cued him, and he was receptive to their redirection. The pt was on SIO 1:1 due to severe intrusiveness.     Problem: Psychotic Signs/Symptoms  Goal: Improved Mood Symptoms (Psychotic Signs/Symptoms)  Outcome: Progressing  Intervention: Optimize Emotion and Mood  Recent Flowsheet Documentation  Taken 2/17/2025 1800 by Jarrod Keenan RN  Supportive Measures:   active listening utilized                               self-responsibility promoted   verbalization of feelings encouraged         self-reflection promoted  Diversional Activity: television  Intervention: Optimize Emotion and Mood  Recent Flowsheet Documentation  Taken 2/17/2025 1800 by Jarrod Keenan RN  Supportive Measures:   active listening utilized                              self-responsibility promoted   verbalization of feelings encouraged        self-reflection promoted  Diversional Activity: television   Goal Outcome Evaluation:    Plan of Care Reviewed With: patient

## 2025-02-18 NOTE — PLAN OF CARE
Problem: Sleep Disturbance  Goal: Adequate Sleep/Rest  Outcome: Progressing  Patient on status individual observation with 1 staffing, and 10 feet space restriction for safety, to prevent/manage severe intrusiveness to mitigate safety risks. Patient affect flat and blunted, mood slightly agitated, easy redirectable. Will continue with current plan of care.

## 2025-02-18 NOTE — PLAN OF CARE
Team Note Due:  Monday    Assessment/Intervention/Current Symtoms and Care Coordination:  Chart review and met with team, discussed pt progress, symptomology, and response to treatment.  Discussed the discharge plan and any potential impediments to discharge. Clifford Aaron is emergency guardian/conservator until 02/27/2025. A petition for permanent guardianship/conservatorship has been filed and a hearing is scheduled for 2/27/25.    Discharge Plan or Goal:  Memory care facility     Barriers to Discharge:  Patient requires further psychiatric stabilization due to current symptomology, medication management with changes subject to provider, coordination with outside supports, and aftercare planning. Pt is under civil commitment.     Referral Status:  Memory Care approved:  [facility]. Tour completed 11/30. Per Clifford on 1/8, she would like to move forward with a referral. Tentative move in date is 2/24/25.  Vera (Exec Director): [phone]      Memory Care facilities currently in process:  [facility]. Tour completed 11/27.  [facility]. Tour scheduled 1/8/25. Have immediate openings and will accept EW. Family not requesting referral yet.  [facility]. Per Clifford on 1/20, she would like to move forward with a referral. Records sent 1/20.  [email or web address]    Memory Care facilities pending family review:  [facility].  [facility].  [facility].  [facility].  [facility].    Memory Care facilities declined:  [facility] (private pay only, no EW).  [facility] (private pay only, no EW).  [facility] (no openings).  [facility]. Tour completed 11/29. Records sent 12/2/24. Declined 12/19/24 (don't feel they are an appropriate facility for pt's needs).  Lorraine (marketing  director): [email or web address]; [phone]  Isatu (director of nursing): [email or web address]  [facility].  Denied 12/26 (concerned about dementia with psychosis, history of hallucinations, high elopement risk, still on 1:1).  Nikki Noel: [email or web address]; Office [phone], Fax [phone]   [facility]. Not accepting EW as of 1/7/25.    Legal Status:  MI Commitment with [facility]  File Number: [id number]  Start and expiration date of commitment: 10/24/24 - 04/24/25    [facility] meds: Haldol, Clozaril, Risperdal, Invega, Zyprexa, Seroquel, Abilify    PPS/CM:  Shelby Chowdary: [phone]  [email or web address].    Contacts:  Maria C Aaron (Daughter): [phone]   Clifford Aaron (ex-wife): [phone]     No Del Angel (guardianship/conservatorship ): [phone]  [email or web address]    Caro Ross ([facility] probate court visitor for guardianship): [phone]  [email or web address]    Derrell Duff ([employer] ): [phone]  [email or web address]     Upcoming Meetings and Dates/Important Information and next steps:  Schedule ambulance transport when discharge is confirmed  Send discharge summary to [facility]  Clarify if they want pt discharged with meds or if meds should be sent to an outside pharmacy  Send PD/Discharge Summary to CM  Send PD/COS to [facility]    Provisional Discharge and Change of Status needed at discharge

## 2025-02-18 NOTE — PLAN OF CARE
"Pt sat much of the shift in his preferred chair in the lounge area. Sleeping intermittently as he sits. He ate his meal trays in the lounge area per his usual.   Some mild mood lability, but overall demeanor has been calm. Verbally redirectable as needed.   Grossly disoriented, pleasantly confused. Unable to meaningfully complete mental health assessment. When asked to describe his mood, pt responded \"kind of.\"   He took all of his scheduled medications without incident. He appears to be eating and hydrating well.     New order for Internal medicine for worsening BLE edema, dyspnea. See note. No new orders. Pt has consistently declining to wear compression stockings.     He continues on SIO 1:1 staff observation (severe intrusiveness) for agitated behavior yesterday 2/17. NO agitation noted today. This afternoon pt was calm and agreeable when staff asked him to go to his room this afternoon in response to disruptive behavior (code 21) from a peer pt.     VSS. BP (!) 148/88   Pulse 62   Temp 97.6  F (36.4  C)   Resp 16   Wt 114.1 kg (251 lb 9.6 oz)   SpO2 100%   BMI 40.61 kg/m      Problem: Adult Behavioral Health Plan of Care  Goal: Plan of Care Review  Outcome: Progressing  Flowsheets (Taken 2/18/2025 0830)  Patient Agreement with Plan of Care: unable to participate     Problem: Psychotic Signs/Symptoms  Goal: Improved Mood Symptoms (Psychotic Signs/Symptoms)  Outcome: Progressing  Intervention: Optimize Emotion and Mood  Recent Flowsheet Documentation  Taken 2/18/2025 0830 by Lavinia Andrade RN  Supportive Measures: active listening utilized  Intervention: Optimize Emotion and Mood  Recent Flowsheet Documentation  Taken 2/18/2025 0830 by Lavinia Andrade, RN  Supportive Measures: active listening utilized   Goal Outcome Evaluation:    Plan of Care Reviewed With: patient                   "

## 2025-02-18 NOTE — PROGRESS NOTES
----------------------------------------------------------------------------------------------------------  [facility]  Psychiatry Progress Note  Hospital Day #129     Interim History:     The patient's care was discussed with the treatment team and chart notes were reviewed.    Identifier: Estevan Aaron is a 64 year old male with previous psychiatric diagnoses of  generalized anxiety disorder, Neurocognitive disorder, admitted from the ED 10/12/2024 due to concern for HI and psychosis, which now have resolved, in the psychosocial stressors (recent divorce).     Sleep: 4 hours (02/18/25 0600)  Scheduled medications: Took all scheduled medications as prescribed  Psychiatric PRN medications:  none       Staff Report:   At the beginning of the shift, the pt was observed sleeping in his room for an hour. Latter, the pt was visible in the milieu, watching TV and occasionally napping in the lounge. The pt displayed a calm mood, with a flat affect, and showed occasional intermittent confusion but was easily redirectable. There were no behavioral escalations or safety concerns noted or reported. The pt complied with HS med and care. The pt only displayed minimal agitation when code 21 was called on the unit and redirected peers to their room. SIO staff verbally cued him, and he was receptive to their redirection. The pt was on SIO 1:1 due to severe intrusiveness.      Subjective:     Patient Interview:  Santos was interviewed in his room. States that he is doing well, no body is bothering him. Reports worsened swelling in his feet specially in his left feet with an electrical pain shooting from his sheen to his ankle. Reports numbness in his hands and feet.   ROS:  Patient denies acute concerns apart from knee pain noted above.     Objective:     Vitals:  BP (!) 148/88   Pulse 62   Temp 97.6  F (36.4  C)   Resp 16   Wt 114.1 kg (251 lb 9.6 oz)   SpO2 100%   BMI 40.61 kg/m       Allergies:  No Known Allergies    Current Medications:  Scheduled:  Current Facility-Administered Medications   Medication Dose Route Frequency Provider Last Rate Last Admin    acetaminophen (TYLENOL) tablet 650 mg  650 mg Oral Q4H PRN Nikki Caceres MD   650 mg at 02/17/25 0757    alum & mag hydroxide-simethicone (MAALOX) suspension 30 mL  30 mL Oral Q4H PRN Nikki Caceres MD   30 mL at 10/17/24 0837    amLODIPine (NORVASC) tablet 10 mg  10 mg Oral Daily Presley Barrera MD   10 mg at 02/18/25 0828    atorvastatin (LIPITOR) tablet 40 mg  40 mg Oral Daily Nikki Caceres MD   40 mg at 02/18/25 0828    cholecalciferol (VITAMIN D3) capsule 1,250 mcg  1,250 mcg Oral Q7 Days Sirisha EveliaDO   1,250 mcg at 02/18/25 1010    cinacalcet (SENSIPAR) tablet 30 mg  30 mg Oral Daily Presley Barrera MD   30 mg at 02/18/25 0828    cloNIDine (CATAPRES) tablet 0.1 mg  0.1 mg Oral BID Presley Barrera MD   0.1 mg at 02/18/25 0828    diclofenac (VOLTAREN) 1 % topical gel 2 g  2 g Topical 4x Daily PRN Rocío Lopez MD   2 g at 12/22/24 2032    furosemide (LASIX) tablet 40 mg  40 mg Oral Daily Presley Barrera MD   40 mg at 02/18/25 0828    gabapentin (NEURONTIN) capsule 100 mg  100 mg Oral Q6H PRN Nikki Caceres MD   100 mg at 12/24/24 0046    hydrocortisone (CORTAID) 0.5 % cream   Topical Daily PRN Savi Chamorro MD        Lidocaine (LIDOCARE) 4 % Patch 1 patch  1 patch Transdermal Q24H PRN Rocío Lopez MD   1 patch at 12/22/24 2023    lisinopril (ZESTRIL) tablet 40 mg  40 mg Oral Daily Presley Barrera MD   40 mg at 02/18/25 0828    melatonin tablet 3 mg  3 mg Oral At Bedtime Presley Barrera MD   3 mg at 02/17/25 2045    metoprolol succinate ER (TOPROL XL) 24 hr tablet 50 mg  50 mg Oral Daily Presley Barrera MD   50 mg at 02/18/25 0828    nicotine (NICODERM CQ) 14 MG/24HR 24 hr patch 1 patch  1 patch Transdermal Daily Evelia Grimm DO   1 patch at 02/18/25 0828     nicotine (NICORETTE) gum 2 mg  2 mg Buccal Q1H PRN González Garcia MD   2 mg at 10/24/24 1254    OLANZapine (zyPREXA) tablet 5 mg  5 mg Oral TID PRN Evelia Grimm DO   5 mg at 02/17/25 1024    Or    OLANZapine (zyPREXA) injection 5 mg  5 mg Intramuscular TID PRN Evelia Grimm DO        OLANZapine (zyPREXA) tablet 2.5 mg  2.5 mg Oral At Bedtime Sherrie Cosby MD        polyethylene glycol (MIRALAX) Packet 17 g  17 g Oral Daily PRN Nikki Caceres MD        senna-docusate (SENOKOT-S/PERICOLACE) 8.6-50 MG per tablet 1 tablet  1 tablet Oral Daily Evelia Grimm DO   1 tablet at 02/18/25 0828    sodium chloride (DWAIN 128) 2 % ophthalmic solution 1 drop  1 drop Both Eyes Q3H PRN Kat Aguilar MD   1 drop at 02/15/25 1205    traZODone (DESYREL) half-tab 25 mg  25 mg Oral At Bedtime Rocío Lopez MD   25 mg at 02/17/25 2044    traZODone (DESYREL) half-tab 25 mg  25 mg Oral At Bedtime PRN Evelia Grimm DO   25 mg at 12/24/24 0046       PRN:  Current Facility-Administered Medications   Medication Dose Route Frequency Provider Last Rate Last Admin    acetaminophen (TYLENOL) tablet 650 mg  650 mg Oral Q4H PRN Nikki Caceres MD   650 mg at 02/17/25 0757    alum & mag hydroxide-simethicone (MAALOX) suspension 30 mL  30 mL Oral Q4H PRN Nikki Caceres MD   30 mL at 10/17/24 0837    amLODIPine (NORVASC) tablet 10 mg  10 mg Oral Daily Presley Barrera MD   10 mg at 02/18/25 0828    atorvastatin (LIPITOR) tablet 40 mg  40 mg Oral Daily Nikki Caceres MD   40 mg at 02/18/25 0828    cholecalciferol (VITAMIN D3) capsule 1,250 mcg  1,250 mcg Oral Q7 Days Evelia Grimm DO   1,250 mcg at 02/18/25 1010    cinacalcet (SENSIPAR) tablet 30 mg  30 mg Oral Daily Presley Barrera MD   30 mg at 02/18/25 0828    cloNIDine (CATAPRES) tablet 0.1 mg  0.1 mg Oral BID Presley Barrera MD   0.1 mg at 02/18/25 0828    diclofenac (VOLTAREN) 1 % topical gel 2 g  2 g Topical 4x Daily PRN Rocío Lopez,  MD   2 g at 12/22/24 2032    furosemide (LASIX) tablet 40 mg  40 mg Oral Daily Presley Barrera MD   40 mg at 02/18/25 0828    gabapentin (NEURONTIN) capsule 100 mg  100 mg Oral Q6H PRN Nikki Caceres MD   100 mg at 12/24/24 0046    hydrocortisone (CORTAID) 0.5 % cream   Topical Daily PRN Savi Chamorro MD        Lidocaine (LIDOCARE) 4 % Patch 1 patch  1 patch Transdermal Q24H PRN Rocío Lopez MD   1 patch at 12/22/24 2023    lisinopril (ZESTRIL) tablet 40 mg  40 mg Oral Daily Presley Barrera MD   40 mg at 02/18/25 0828    melatonin tablet 3 mg  3 mg Oral At Bedtime Presley Barrera MD   3 mg at 02/17/25 2045    metoprolol succinate ER (TOPROL XL) 24 hr tablet 50 mg  50 mg Oral Daily Presley Barrera MD   50 mg at 02/18/25 0828    nicotine (NICODERM CQ) 14 MG/24HR 24 hr patch 1 patch  1 patch Transdermal Daily Evelia Grimm DO   1 patch at 02/18/25 0828    nicotine (NICORETTE) gum 2 mg  2 mg Buccal Q1H PRN González Garcia MD   2 mg at 10/24/24 1254    OLANZapine (zyPREXA) tablet 5 mg  5 mg Oral TID PRN Evelia Grimm DO   5 mg at 02/17/25 1024    Or    OLANZapine (zyPREXA) injection 5 mg  5 mg Intramuscular TID PRN Evelia Grimm DO        OLANZapine (zyPREXA) tablet 2.5 mg  2.5 mg Oral At Bedtime Sherrie Cosby MD        polyethylene glycol (MIRALAX) Packet 17 g  17 g Oral Daily PRN Nikki Caceres MD        senna-docusate (SENOKOT-S/PERICOLACE) 8.6-50 MG per tablet 1 tablet  1 tablet Oral Daily Evelia Grimm DO   1 tablet at 02/18/25 0828    sodium chloride (DWAIN 128) 2 % ophthalmic solution 1 drop  1 drop Both Eyes Q3H PRN Kat Aguilar MD   1 drop at 02/15/25 1205    traZODone (DESYREL) half-tab 25 mg  25 mg Oral At Bedtime Rocío Lopez MD   25 mg at 02/17/25 2044    traZODone (DESYREL) half-tab 25 mg  25 mg Oral At Bedtime PRN Evelia Grimm DO   25 mg at 12/24/24 0046     Labs and Imaging:  Data this admission:  - CBC unremarkable  - CMP unremarkable  -  "TSH normal  - UDS negative  - Vit D low  - Hgb A1c 5.9 (10/13/24)  - Lipids unremarkable  - Vit B12 normal  - Folate normal  - Urinalysis unremarkable  - EKG normal sinus rhythm, QTc 390 ms  - Head CT showed no acute changes  - HIV non reactive  - Treponema antibody non reactive  - ESR wnl   - Ceruloplasmin wnl   - BOOGIE negative  - Lyme negative      Parathyroid hormone:   85 (10/13)     Calcium:  11.4 (10/14) -> 10.3 (10/16) -> 9.8 (10/19) -> 10.6 (10/21) -> 10.3 (10/23)     Albumin:  4.3 (10/14) -->  4.3 (10/16) -> 4.1 (10/17) -> 4.2 (10/19) -> 4.5 (10/21) -> 4.3 (10/23)      Mental Status Exam:     Oriented to:  Person/Self  General:  Alert, eating in the lounge  Appearance:  Appears stated age, dressed in own clothing, overweight, grooming adequate with reported shower today  Behavior/Attitude: Cooperative, irritable   Eye Contact: Appropriate  Psychomotor: Normal and No evidence of tics, dystonia, or tardive dyskinesia  no catatonia present  Speech:  loud volume/tone  Language: Fluent in English with appropriate syntax and vocabulary.  Mood:  \"Fine\"  Affect:   appropriate and congruent with mood  Thought Process:   concrete, disorganized, confused  Thought Content:   No SI/HI/AH/VH;  No apparent delusions today  Associations:  loose  Insight:  impaired due to neurocognitive disorder   Judgment:  impaired due to neurocognitive disorder  Impulse control: impaired  Attention Span:  inattentive, limited  Concentration:   impaired by neurocognitive disorder  Recent and Remote Memory:   remote memory intact, recent memory impaired  Fund of Knowledge: average  Muscle Strength and Tone: normal  Gait and Station: Normal      Psychiatric Assessment     Estevan Aaron is a 64 year old male with previous psychiatric diagnoses of GEORGINA admitted from the ER on 10/12/2024 due to concern for HI and psychosis in the context of medical issues (hyperparathyroidism, hypercalcemia), psychosocial stressors including with recent divorce " and selling his home. This is the patient's first psychiatric hospitalization. The MSE on admission was pertinent for a thought process which was perseverative, circumstantial, tangential, disorganized and tangential; with rambling and looseness of associations. Psychological contributions to presentation included lack of insight. Social factors contributing to presentation included isolation, recent divorce, selling his house, and moving from hotel to hotel. Biological contributions to presentation included a history of hyperparathyroidism with chronic hypercalcemia per charts as well as a history of methamphetamine use per collateral from ex-wife.     Per collateral from ex-wife, Santos has had paranoid ideations since they first met. He has always felt like people are out to get him or trying to rip him off. She says that he has had visual hallucinations (he will point things out that the rest of the family can't see) for a long time, but the family would just go along with him to avoid making him angry. This timeline and presentation could be consistent with diagnosis of paranoid personality disorder. Things began to worsen around the beginning of the COVID pandemic, when Santos became more isolated and the family started to notice cognitive issues such as impaired memory. Other things that could be contributing to his presentation is hyperparathyroidism with hypercalcemia. However, patient's calcium returned to normal limits on 10/16, and patient continues to have disorganized behavior unrelated to calcium elevation, so likely this is not a significant contributing factor to patient's presentation.      Currently, Santos is at times disoriented, but easily re-directable. Presents with some difficulty of word articulation, which contributes to his frustration when interacting with psych team, as he finds it difficult to explain himself. He occasionally has struggled to attend to ADLS particularly bathing and required  frequent encouragement, likely due to underlying disorganized behavior. Overall, he is mostly independent in the unit. His confused behavior tends to increase in the evenings, seemingly related to a Neurocognitive Disorder diagnosis, and this could evolve to be his new baseline. He does not present as a danger to himself or others so his SIO was discontinued. Santos does not present with any new episodes of physical agitation and is able to attend groups and interact with peers without major altercations. Patient currently is stable with current neuroleptic doses in regards to improved paranoia. However, neuroleptics could be worsening his brain fog and may benefit from a down titration as a trial while awaiting placement. He is doing well with a reduction of Zyprexa and the plan is to discontinue it week of 2/10 before expected discharge on 2/24. Due to inability to care for self outside a supportive sxs, would benefit from continued hospital stay with discharge to group home/nursing home when available.          Psychiatric Plan by Diagnosis     Today's changes:  - Zyprexa 2.5 mg was added    # Major Neurocognitive Disorder  1. Medications:  - Trazodone 25 mg HS  - Zyprexa 2.5 mg  - PRN Zyprexa 5 mg TID     3. Additional Plans:  - Patient will be treated in therapeutic milieu with appropriate individual and group therapies as described  - Pending memory facility placement.      # Unspecified anxiety vs Generalized Anxiety Disorder  - Monitor for symptoms.  - Fluoxetine held due to suspicion of ongoing manic symptoms     Psychiatric Hospital Course:      Estevan Aaron was admitted to [facility] on a 72 hour hold.   Medications:  PTA fluoxetine was held due to concern for worsening of zelalem   New medications started at the time of admission include Zyprexa.   Increased olanzapine 10 mg at bedtime was to 10 mg BID (10/14)   Increased olanzapine 10 mg BID to 10 mg during day and 15 mg at bedtime (10/15)  10/21: increased  olanzapine pm dose from 15 to 20 mg  10/23: started melatonin 3 mg at 7 pm to help patient with circadian rhythm as he has been staying up throughout the night and sleeping a lot during the day and there is some concern for delirium   10/24: consulted anesthesia for MRI brain   10/28: MRI brain complete, decrease AM olanzapine from 10 to 5 mg  10/29: Morning olanzapine decreased to 2.5 mg, evening olanzapine decreased to 15 mg   11/1: Decreased evening olanzapine to 10 mg   11/4: Decreased evening olanzapine to 7.5 mg   11/5: Decreased evening olanzapine to 5 mg   11/11: Moved morning dose of olanzapine to the afternoon as patient appears more agitated in the afternoons/evenings  11/21: Started memantine 5 mg daily   11/26: Discontinued olanzapine 2.5 mg during the afternoon  12/2:   Discontinued memantine 5 mg, daily  2/5: as psychosis has improved with less paranoia, it was thought patient could benefit from a decrease in Zyprexa as it could be worsening is cognitive sxs and given black box warning for his age category. Decreased Zyprexa to 2.5mg at bedtime as a trial. Can restart if psychosis worsens. Seems to be tolerating this well.   2/10: Discontinued Zyprexa  2/18: Restarted Zyprexa 2.5 mg     The risks, benefits, alternatives, and side effects were discussed and understood by the patient and other caregivers.     Medical Assessment and Plan     Medical diagnoses to be addressed this admission:    # Hip Pain  - New right hip pain, and mild pain in multiple joints. Moderate response to topical antiinflammatory gel and lidocaine patch.   - Medicine consulted for recommendations 12/20    # CHF  # Hypertension  - Continue PTA medications  Furosemide 40 mg daily  Lisinopril 40 mg daily  Metoprolol 50 mg daily  Amlodipine 10 mg daily  Clonidine 0.1 mg BID    # Hyperlipidemia  - Continue PTA Atorvastatin 40 mg     # Primary Hyperparathyroidism  # Hypercalcemia, hypophosphoremia   Increased Ca level to 10.9 and  decreased Ph level to 2.3 in the ED   - Consulted endocrinology, who started cinacalcet 30 mg BID on 10/13  - 10/16 endocrinology recommended continuing to trend calcium and albumin to make sure patient does not become hypocalcemic and recommended holding cinacalcet   - 10/17, calcium and albumin wnl, endo recommended cinacalcet 30 mg once daily   - 10/21, per endo continue cincaclcet and recheck calcium and albumin on October 24  - 10/23: per endo, patient needs to follow up outpatient for further management of hyperparathyroidism     # Rhinorrhea  1/15 currently multiple COVID infections on unit. No other symptoms of COVID noted. COVID PCR - on 1/13.    #Chipped tooth: Pt chipped a molar at dinner on 2/4, denied pain. No bleeding.  - would benefit from outpt dental follow up.     Medical course: Patient was physically examined by the ED prior to being transferred to the unit and was found to be medically stable and appropriate for admission.      Consults: Psychiatry, Endocrinology (follow-up of hyperparathyroidism / hypercalcemia and hypertension), neurology     Checklist     Legal Status: Committed   [facility] meds: Haldol, Clozaril, Risperdal, Invega, Zyprexa, Seroquel, Abilify    Safety Assessment:   Behavioral Orders   Procedures    Code 1 - Restrict to Unit    Routine Programming     As clinically indicated    Status 15     Every 15 minutes.    Status Individual Observation     Patient SIO status reviewed with team/RN.  Please also refer to RN/team documentation for add'l detail.    -SIO staff to monitor following which have contributed to patient being on SIO:  Patient intrusiveness to other patients  -Possible interventions SIO staff could use to support patient's treatment progress:  Redirection  -When following observed, team will review discontinuation of SIO:  Improvement in intrusive behavior.     Order Specific Question:   CONTINUOUS 24 hours / day     Answer:   Other     Order Specific Question:    Specify distance     Answer:   10 feet     Order Specific Question:   Indications for SIO     Answer:   Severe intrusiveness    Status Individual Observation     Patient SIO status reviewed with team/RN.  Please also refer to RN/team documentation for add'l detail.    -SIO staff to monitor following which have contributed to patient being on SIO:  Being intrusive, vulnurable   -Possible interventions SIO staff could use to support patient's treatment progress:  Redirection, avoiding conflict with other pts  -When following observed, team will review discontinuation of SIO:  Pt is calm     Order Specific Question:   CONTINUOUS 24 hours / day     Answer:   Other     Order Specific Question:   Specify distance     Answer:   10 feet     Order Specific Question:   Indications for SIO     Answer:   Severe intrusiveness       Risk Assessment:  Risk for harm is low  Risk factors: impulsive and past behaviors  Protective factors: family      SIO: Yes, foe severe intrusiveness and being vulnurable    Disposition: Pending transfer to memory care facility.  Accepted to [facility] for 2/24.      Attestations   Sherrie Cosby MD  PGY1 Psychiatry Resident    Attestation:  This patient has been seen and evaluated by me, Pete Smith MD.  I have discussed this patient with the house staff team including the resident and/or medical student and I agree with the findings and plan in this note.    I have reviewed today's vital signs, medications, labs and imaging. Pete Smith MD , PhD.

## 2025-02-19 PROCEDURE — 99231 SBSQ HOSP IP/OBS SF/LOW 25: CPT | Mod: GC | Performed by: PSYCHIATRY & NEUROLOGY

## 2025-02-19 PROCEDURE — 250N000013 HC RX MED GY IP 250 OP 250 PS 637

## 2025-02-19 PROCEDURE — 124N000002 HC R&B MH UMMC

## 2025-02-19 RX ORDER — OLANZAPINE 2.5 MG/1
2.5 TABLET, FILM COATED ORAL AT BEDTIME
Status: DISCONTINUED | OUTPATIENT
Start: 2025-02-19 | End: 2025-02-24 | Stop reason: HOSPADM

## 2025-02-19 RX ORDER — OLANZAPINE 10 MG/2ML
5 INJECTION, POWDER, FOR SOLUTION INTRAMUSCULAR AT BEDTIME
Status: DISCONTINUED | OUTPATIENT
Start: 2025-02-19 | End: 2025-02-24 | Stop reason: HOSPADM

## 2025-02-19 RX ADMIN — FUROSEMIDE 40 MG: 40 TABLET ORAL at 08:08

## 2025-02-19 RX ADMIN — CLONIDINE HYDROCHLORIDE 0.1 MG: 0.1 TABLET ORAL at 08:07

## 2025-02-19 RX ADMIN — Medication 1 PATCH: at 08:14

## 2025-02-19 RX ADMIN — Medication 25 MG: at 20:12

## 2025-02-19 RX ADMIN — SENNOSIDES AND DOCUSATE SODIUM 1 TABLET: 50; 8.6 TABLET ORAL at 08:08

## 2025-02-19 RX ADMIN — OLANZAPINE 5 MG: 5 TABLET, FILM COATED ORAL at 10:23

## 2025-02-19 RX ADMIN — AMLODIPINE BESYLATE 10 MG: 5 TABLET ORAL at 08:07

## 2025-02-19 RX ADMIN — OLANZAPINE 2.5 MG: 2.5 TABLET, FILM COATED ORAL at 17:26

## 2025-02-19 RX ADMIN — CINACALCET 30 MG: 30 TABLET ORAL at 08:08

## 2025-02-19 RX ADMIN — MELATONIN TAB 3 MG 3 MG: 3 TAB at 20:12

## 2025-02-19 RX ADMIN — METOPROLOL SUCCINATE 50 MG: 50 TABLET, EXTENDED RELEASE ORAL at 08:07

## 2025-02-19 RX ADMIN — CLONIDINE HYDROCHLORIDE 0.1 MG: 0.1 TABLET ORAL at 20:12

## 2025-02-19 RX ADMIN — LISINOPRIL 40 MG: 10 TABLET ORAL at 08:06

## 2025-02-19 RX ADMIN — ATORVASTATIN CALCIUM 40 MG: 20 TABLET, FILM COATED ORAL at 08:07

## 2025-02-19 ASSESSMENT — ACTIVITIES OF DAILY LIVING (ADL)
ADLS_ACUITY_SCORE: 87
ADLS_ACUITY_SCORE: 87
ADLS_ACUITY_SCORE: 77
ADLS_ACUITY_SCORE: 87
ADLS_ACUITY_SCORE: 77
ADLS_ACUITY_SCORE: 87
LAUNDRY: WITH SUPERVISION
ADLS_ACUITY_SCORE: 77
ADLS_ACUITY_SCORE: 87
LAUNDRY: WITH SUPERVISION
DRESS: STREET CLOTHES;INDEPENDENT
ADLS_ACUITY_SCORE: 87
ADLS_ACUITY_SCORE: 87
ADLS_ACUITY_SCORE: 77
ADLS_ACUITY_SCORE: 87
ORAL_HYGIENE: INDEPENDENT
ORAL_HYGIENE: INDEPENDENT;PROMPTS
ADLS_ACUITY_SCORE: 87
ADLS_ACUITY_SCORE: 87
HYGIENE/GROOMING: HANDWASHING;SHOWER;INDEPENDENT
ADLS_ACUITY_SCORE: 87
DRESS: STREET CLOTHES;INDEPENDENT
ADLS_ACUITY_SCORE: 87
ADLS_ACUITY_SCORE: 77
ADLS_ACUITY_SCORE: 87
ADLS_ACUITY_SCORE: 87
ADLS_ACUITY_SCORE: 77
HYGIENE/GROOMING: PROMPTS;INDEPENDENT

## 2025-02-19 NOTE — PLAN OF CARE
BEH IP Unit Acuity Rating Score (UARS)  Patient is given one point for every criteria they meet.    CRITERIA SCORING   On a 72 hour hold, court hold, committed, stay of commitment, or revocation. 1    Patient LOS on BEH unit exceeds 20 days. 1  LOS: 130   Patient under guardianship, 55+, otherwise medically complex, or under age 11. 1   Suicide ideation without relief of precipitating factors. 0   Current plan for suicide. 0   Current plan for homicide. 0   Imminent risk or actual attempt to seriously harm another without relief of factors precipitating the attempt. 1   Severe dysfunction in daily living (ex: complete neglect for self care, extreme disruption in vegetative function, extreme deterioration in social interactions). 1   Recent (last 7 days) or current physical aggression in the ED or on unit. 0   Restraints or seclusion episode in past 72 hours. 0   Recent (last 7 days) or current verbal aggression, agitation, yelling, etc., while in the ED or unit. 0   Active psychosis. 1   Need for constant or near constant redirection (from leaving, from others, etc).  0   Intrusive or disruptive behaviors. 0   Patient requires 3 or more hours of individualized nursing care per 8-hour shift (i.e. for ADLs, meds, therapeutic interventions). 0   TOTAL 6

## 2025-02-19 NOTE — PLAN OF CARE
Team Note Due:  Monday    Assessment/Intervention/Current Symtoms and Care Coordination:  Chart review and met with team, discussed pt progress, symptomology, and response to treatment.  Discussed the discharge plan and any potential impediments to discharge. Clifford Aaron is emergency guardian/conservator until 02/27/2025. A petition for permanent guardianship/conservatorship has been filed and a hearing is scheduled for 2/27/25.    Contacted [facility]  to notify of pt being back on scheduled Zyprexa and requested clarification on how they'd prefer pt's discharge medications to be ordered.    Discharge Plan or Goal:  Memory care facility     Barriers to Discharge:  Patient requires further psychiatric stabilization due to current symptomology, medication management with changes subject to provider, coordination with outside supports, and aftercare planning. Pt is under civil commitment.     Referral Status:  Memory Care approved:  [facility]. Tour completed 11/30. Per Clifford on 1/8, she would like to move forward with a referral. Confirmed move in date is 2/24/25.  Vera (Exec Director): [phone]      Memory Care facilities currently in process:  [facility]. Tour completed 11/27.  [facility]. Tour scheduled 1/8/25. Have immediate openings and will accept EW. Family not requesting referral yet.  [facility]. Per Clifford on 1/20, she would like to move forward with a referral. Records sent 1/20.  [email or web address]    Memory Care facilities pending family review:  [facility].  [facility].  [facility].  [facility].  [facility].    Memory Care facilities declined:  [facility] (private pay only, no EW).  [facility] [address] (private pay only, no EW).  [facility] (no openings).  [facility]. Tour completed 11/29. Records sent 12/2/24. Declined 12/19/24 (don't feel they are an appropriate facility for pt's needs).  [facility] (): [email or web address]; [phone]  Isatu (director of nursing): [email or web address]  [facility].  Denied 12/26 (concerned about dementia with psychosis, history of hallucinations, high elopement risk, still on 1:1).  Nikki Noel: [email or web address]; Office [phone], Fax [phone]   [facility]. Not accepting EW as of 1/7/25.    Legal Status:  MI Commitment with [facility]: [place]  File Number: [id number]  Start and expiration date of commitment: 10/24/24 - 04/24/25    [facility] meds: Haldol, Clozaril, Risperdal, Invega, Zyprexa, Seroquel, Abilify    PPS/CM:  Shelby Chowdary: [phone]  [email or web address].    Contacts:  Maria C Aaron (Daughter): [phone]   Clifford Aaron (ex-wife): [phone]     No Del Angel (guardianship/conservatorship ): [phone]  [email or web address]    Caro Ross ([facility] probate court visitor for guardianship): [phone]  [email or web address]    Derrell Duff ([employer] ): [phone]  [email or web address]     Upcoming Meetings and Dates/Important Information and next steps:  Schedule ambulance transport when discharge is confirmed  Send discharge summary to [facility]  Clarify if they want pt discharged with meds or if meds should be sent to an outside pharmacy  Send PD/Discharge Summary to CM  Send PD/COS to [facility]    Provisional Discharge and Change of Status needed at discharge

## 2025-02-19 NOTE — PLAN OF CARE
"  Problem: Adult Behavioral Health Plan of Care  Goal: Plan of Care Review  Outcome: Progressing  Flowsheets  Taken 2/19/2025 1419  Plan of Care Reviewed With: patient  Taken 2/19/2025 1000  Patient Agreement with Plan of Care: agrees  Goal: Patient-Specific Goal (Individualization)  Description: You can add care plan individualizations to a care plan. Examples of Individualization might be:  \"Parent requests to be called daily at 9am for status\", \"I have a hard time hearing out of my right ear\", or \"Do not touch me to wake me up as it startles  me\".  Outcome: Progressing  Goal: Adheres to Safety Considerations for Self and Others  Outcome: Progressing  Intervention: Develop and Maintain Individualized Safety Plan  Recent Flowsheet Documentation  Taken 2/19/2025 1000 by Vera Graham RN  Safety Measures: 1:1 observation maintained  Intervention: Develop and Maintain Individualized Safety Plan  Recent Flowsheet Documentation  Taken 2/19/2025 1000 by Vera Graham RN  Safety Measures: 1:1 observation maintained  Goal: Absence of New-Onset Illness or Injury  Outcome: Progressing  Intervention: Identify and Manage Fall Risk  Recent Flowsheet Documentation  Taken 2/19/2025 1000 by Vera Graham RN  Safety Measures: 1:1 observation maintained  Intervention: Prevent VTE (Venous Thromboembolism)  Recent Flowsheet Documentation  Taken 2/19/2025 1000 by Vera Graham RN  VTE Prevention/Management: SCDs off (sequential compression devices)  Intervention: Prevent VTE (Venous Thromboembolism)  Recent Flowsheet Documentation  Taken 2/19/2025 1000 by Vera Graham RN  VTE Prevention/Management: SCDs off (sequential compression devices)  Goal: Optimized Coping Skills in Response to Life Stressors  Outcome: Progressing  Intervention: Promote Effective Coping Strategies  Recent Flowsheet Documentation  Taken 2/19/2025 1000 by Vera Graham RN  Supportive Measures: active listening " "utilized  Intervention: Promote Effective Coping Strategies  Recent Flowsheet Documentation  Taken 2/19/2025 1000 by Vera Graham RN  Supportive Measures: active listening utilized  Goal: Develops/Participates in Therapeutic [place] to Support Successful Transition  Intervention: Foster Therapeutic [place]  Recent Flowsheet Documentation  Taken 2/19/2025 1000 by Vera Graham RN  Trust Relationship/Rapport:   care explained   choices provided   emotional support provided   empathic listening provided   questions answered   reassurance provided   thoughts/feelings acknowledged  Intervention: Foster Therapeutic [place]  Recent Flowsheet Documentation  Taken 2/19/2025 1000 by Vera Graham RN  Trust Relationship/Rapport:   care explained   choices provided   emotional support provided   empathic listening provided   questions answered   reassurance provided   thoughts/feelings acknowledged       Goal Outcome Evaluation:    Plan of Care Reviewed With: patient     Patient was in his room at the start of shift. Shortly after, [facility]. He presented with a flat and blunted affect. His mood was calm at the time. He was cooperative and medication compliant. He denied pain. He denied all psych MH symptoms and contracted for safety. At the midmorning, patient became delusional and very agitated. He was hyper-verbal. [facility] team was called. He said to one of the DARREN team members who was trying to deescalate his situation that he wanted his truck and he wanted it \"now\" and he needed to get out of \"This place\". It took quite some time with a lot of story telling to calm him down. PRN Zyprexa 5 mg was given at 1000 am. Patient eventually Quieted down and sat at the lounge to watch TV and was calm till the end of shift. There was no other behavioral escalations and safety concerns noted during the remaining part of the shift. Will continue the current plan of care and support. Will " notify provider of any concerns.

## 2025-02-19 NOTE — PLAN OF CARE
Problem: Sleep Disturbance  Goal: Adequate Sleep/Rest  Outcome: Progressing   Patient is being monitored per Status Individual Observation due to severe intrusiveness. Number of staff required to monitor 1. Staff to patient distance 10, Staff to remain 10 feet from the patient space. Patient appears sleeping most of the shift. No complaints of pain and discomfort. Patient continues on q15 minutes safety checks, no behavioral issues noted. Patient slept for  Will continue to monitor the patient and provide therapeutic intervention as needed. Will continue with current plan of care. Notify MD with any concerns. The patient had 6.5 total hours of sleep this shift.

## 2025-02-19 NOTE — PLAN OF CARE
The pt was observed in the milieu for most of the shift, watching TV and minimally interacting with selected peers. The pt was clam, with a flat/labile affect. The pt denied experiencing anxiety, depression, AH/VH, SI/HI and contracted for safety. The pt remained displayed occasional intermittent confusion and poor boundaries with peers but staff provided verbal cues and the pt was receptive without behavioral escalation. The pt remained on SIO 1:1 due to severe intrusiveness. During the HS med administration, the pt displayed agitation and hyper verbal behavior despite being offered scheduled med. The pt declined, and when prompted by another RN, refused  scheduled PO Zyprexa as the med was Ortega with IM back up. The DARREN team was called and when the pt saw multiple staff members, he promptly complied with PO Zyprexa without behavioral escalations.    Problem: Adult Behavioral Health Plan of Care  Goal: Plan of Care Review  Outcome: Progressing  Flowsheets (Taken 2/18/2025 1700)  Plan of Care Reviewed With: patient  Overall Patient Progress: improving  Patient Agreement with Plan of Care: agrees     Problem: Psychotic Signs/Symptoms  Goal: Improved Mood Symptoms (Psychotic Signs/Symptoms)  Outcome: Progressing  Intervention: Optimize Emotion and Mood  Recent Flowsheet Documentation  Taken 2/18/2025 1700 by Jarrod Keenan RN  Supportive Measures: active listening utilized  Diversional Activity: television  Intervention: Optimize Emotion and Mood  Recent Flowsheet Documentation  Taken 2/18/2025 1700 by Jarrod Keenan RN  Supportive Measures: active listening utilized  Diversional Activity: television  Goal: Improved Psychomotor Symptoms (Psychotic Signs/Symptoms)  Outcome: Progressing  Intervention: Manage Psychomotor Movement  Recent Flowsheet Documentation  Taken 2/18/2025 1700 by Jarrod Keenan RN  Diversional Activity: television  Activity (Behavioral Health):   activity encouraged   up ad diya   Goal  Outcome Evaluation:    Plan of Care Reviewed With: patient Plan of Care Reviewed With: patient    Overall Patient Progress: improvingOverall Patient Progress: improving

## 2025-02-19 NOTE — PROGRESS NOTES
----------------------------------------------------------------------------------------------------------  [facility]  Psychiatry Progress Note  Hospital Day #130     Interim History:     The patient's care was discussed with the treatment team and chart notes were reviewed.    Identifier: Estevan Aaron is a 64 year old male with previous psychiatric diagnoses of  generalized anxiety disorder, Neurocognitive disorder, admitted from the ED 10/12/2024 due to concern for HI and psychosis, which now have resolved, in the psychosocial stressors (recent divorce).     Sleep: 6.25 hours (02/19/25 0600)  Scheduled medications: Took all scheduled medications as prescribed  Psychiatric PRN medications:  none       Staff Report:   Patient is being monitored per Status Individual Observation due to severe intrusiveness. Number of staff required to monitor 1. Staff to patient distance 10, Staff to remain 10 feet from the patient space. Patient appears sleeping most of the shift. No complaints of pain and discomfort. Patient continues on q15 minutes safety checks, no behavioral issues noted. Patient slept for  Will continue to monitor the patient and provide therapeutic intervention as needed. Will continue with current plan of care. Notify MD with any concerns. The patient had 6.5 total hours of sleep this shift.      Subjective:     Patient Interview:  Santos was seen in the milieu. States that he is doing well, Still has the electrical pain and numbness in his feet. He is confused and asks if we can remove our masks and make this visit short. Denies any mental health symptoms.     ROS:  Patient denies acute concerns apart from foot pain and numbness noted above.     Objective:     Vitals:  BP (!) 144/85   Pulse 58   Temp (!) 96.7  F (35.9  C)   Resp 16   Wt 114.1 kg (251 lb 9.6 oz)   SpO2 99%   BMI 40.61 kg/m      Allergies:  No Known Allergies    Current  Medications:  Scheduled:  Current Facility-Administered Medications   Medication Dose Route Frequency Provider Last Rate Last Admin    acetaminophen (TYLENOL) tablet 650 mg  650 mg Oral Q4H PRN Nikki Caceres MD   650 mg at 02/17/25 0757    alum & mag hydroxide-simethicone (MAALOX) suspension 30 mL  30 mL Oral Q4H PRN Nikki Caceres MD   30 mL at 10/17/24 0837    amLODIPine (NORVASC) tablet 10 mg  10 mg Oral Daily Presley Barrera MD   10 mg at 02/19/25 0807    atorvastatin (LIPITOR) tablet 40 mg  40 mg Oral Daily Nikki Caceres MD   40 mg at 02/19/25 0807    cholecalciferol (VITAMIN D3) capsule 1,250 mcg  1,250 mcg Oral Q7 Days Domingo GrimmjiDO   1,250 mcg at 02/18/25 1010    cinacalcet (SENSIPAR) tablet 30 mg  30 mg Oral Daily Presley Barrera MD   30 mg at 02/19/25 0808    cloNIDine (CATAPRES) tablet 0.1 mg  0.1 mg Oral BID Presley Barrera MD   0.1 mg at 02/19/25 0807    diclofenac (VOLTAREN) 1 % topical gel 2 g  2 g Topical 4x Daily PRN Rocío Lopez MD   2 g at 12/22/24 2032    furosemide (LASIX) tablet 40 mg  40 mg Oral Daily Presley Barrera MD   40 mg at 02/19/25 0808    gabapentin (NEURONTIN) capsule 100 mg  100 mg Oral Q6H PRN Nikki Caceres MD   100 mg at 12/24/24 0046    hydrocortisone (CORTAID) 0.5 % cream   Topical Daily PRN Savi Chamorro MD        Lidocaine (LIDOCARE) 4 % Patch 1 patch  1 patch Transdermal Q24H PRN Rocío Lopez MD   1 patch at 12/22/24 2023    lisinopril (ZESTRIL) tablet 40 mg  40 mg Oral Daily Presley Barrera MD   40 mg at 02/19/25 0806    melatonin tablet 3 mg  3 mg Oral At Bedtime Presley Barrera MD   3 mg at 02/17/25 2045    metoprolol succinate ER (TOPROL XL) 24 hr tablet 50 mg  50 mg Oral Daily Presley Barrera MD   50 mg at 02/19/25 0807    nicotine (NICODERM CQ) 14 MG/24HR 24 hr patch 1 patch  1 patch Transdermal Daily Evelia Grimm DO   1 patch at 02/19/25 0814    nicotine (NICORETTE) gum 2 mg  2 mg Buccal Q1H  PRN González Garcia MD   2 mg at 10/24/24 1254    OLANZapine (zyPREXA) tablet 2.5 mg  2.5 mg Oral At Bedtime González Garcia MD   2.5 mg at 02/18/25 2204    Or    OLANZapine (zyPREXA) injection 5 mg  5 mg Intramuscular At Bedtime González Garcia MD        OLANZapine (zyPREXA) tablet 5 mg  5 mg Oral TID PRN Evelia Grimm DO   5 mg at 02/17/25 1024    Or    OLANZapine (zyPREXA) injection 5 mg  5 mg Intramuscular TID PRN Evelia Grimm DO        polyethylene glycol (MIRALAX) Packet 17 g  17 g Oral Daily PRN Nikki Caceres MD        senna-docusate (SENOKOT-S/PERICOLACE) 8.6-50 MG per tablet 1 tablet  1 tablet Oral Daily Evelia Grimm DO   1 tablet at 02/19/25 0808    sodium chloride (DWAIN 128) 2 % ophthalmic solution 1 drop  1 drop Both Eyes Q3H PRN Kat Aguilar MD   1 drop at 02/15/25 1205    traZODone (DESYREL) half-tab 25 mg  25 mg Oral At Bedtime Rocío Lopez MD   25 mg at 02/17/25 2044    traZODone (DESYREL) half-tab 25 mg  25 mg Oral At Bedtime PRN Evelia Grimm DO   25 mg at 12/24/24 0046       PRN:  Current Facility-Administered Medications   Medication Dose Route Frequency Provider Last Rate Last Admin    acetaminophen (TYLENOL) tablet 650 mg  650 mg Oral Q4H PRN Nikki Caceres MD   650 mg at 02/17/25 0757    alum & mag hydroxide-simethicone (MAALOX) suspension 30 mL  30 mL Oral Q4H PRN Nikki Caceres MD   30 mL at 10/17/24 0837    amLODIPine (NORVASC) tablet 10 mg  10 mg Oral Daily Presley Barrera MD   10 mg at 02/19/25 0807    atorvastatin (LIPITOR) tablet 40 mg  40 mg Oral Daily Nikki Caceres MD   40 mg at 02/19/25 0807    cholecalciferol (VITAMIN D3) capsule 1,250 mcg  1,250 mcg Oral Q7 Days Evelia Grimm DO   1,250 mcg at 02/18/25 1010    cinacalcet (SENSIPAR) tablet 30 mg  30 mg Oral Daily Presley Barrera MD   30 mg at 02/19/25 0808    cloNIDine (CATAPRES) tablet 0.1 mg  0.1 mg Oral BID Presley Barrera MD   0.1 mg at 02/19/25 0807    diclofenac (VOLTAREN)  1 % topical gel 2 g  2 g Topical 4x Daily PRN Rocío Lopez MD   2 g at 12/22/24 2032    furosemide (LASIX) tablet 40 mg  40 mg Oral Daily Presley Barrera MD   40 mg at 02/19/25 0808    gabapentin (NEURONTIN) capsule 100 mg  100 mg Oral Q6H PRN Nikki Caceres MD   100 mg at 12/24/24 0046    hydrocortisone (CORTAID) 0.5 % cream   Topical Daily PRN Savi Chamorro MD        Lidocaine (LIDOCARE) 4 % Patch 1 patch  1 patch Transdermal Q24H PRN Rocío Lopez MD   1 patch at 12/22/24 2023    lisinopril (ZESTRIL) tablet 40 mg  40 mg Oral Daily Presley Barrera MD   40 mg at 02/19/25 0806    melatonin tablet 3 mg  3 mg Oral At Bedtime Presley Barrera MD   3 mg at 02/17/25 2045    metoprolol succinate ER (TOPROL XL) 24 hr tablet 50 mg  50 mg Oral Daily Presley Barrera MD   50 mg at 02/19/25 0807    nicotine (NICODERM CQ) 14 MG/24HR 24 hr patch 1 patch  1 patch Transdermal Daily Evelia Grimm DO   1 patch at 02/19/25 0814    nicotine (NICORETTE) gum 2 mg  2 mg Buccal Q1H PRN González Garcia MD   2 mg at 10/24/24 1254    OLANZapine (zyPREXA) tablet 2.5 mg  2.5 mg Oral At Bedtime González Garcia MD   2.5 mg at 02/18/25 2204    Or    OLANZapine (zyPREXA) injection 5 mg  5 mg Intramuscular At Bedtime González Garcia MD        OLANZapine (zyPREXA) tablet 5 mg  5 mg Oral TID PRN Evelia Grimm DO   5 mg at 02/17/25 1024    Or    OLANZapine (zyPREXA) injection 5 mg  5 mg Intramuscular TID PRN Evelia Grimm DO        polyethylene glycol (MIRALAX) Packet 17 g  17 g Oral Daily PRN Nikki Caceres MD        senna-docusate (SENOKOT-S/PERICOLACE) 8.6-50 MG per tablet 1 tablet  1 tablet Oral Daily Evelia Grimm,    1 tablet at 02/19/25 0808    sodium chloride (DWAIN 128) 2 % ophthalmic solution 1 drop  1 drop Both Eyes Q3H PRN Kat Aguilar MD   1 drop at 02/15/25 1205    traZODone (DESYREL) half-tab 25 mg  25 mg Oral At Bedtime Rocío Lopez MD   25 mg  "at 02/17/25 2044    traZODone (DESYREL) half-tab 25 mg  25 mg Oral At Bedtime PRN Evelia Grimm,    25 mg at 12/24/24 0046     Labs and Imaging:  Data this admission:  - CBC unremarkable  - CMP unremarkable  - TSH normal  - UDS negative  - Vit D low  - Hgb A1c 5.9 (10/13/24)  - Lipids unremarkable  - Vit B12 normal  - Folate normal  - Urinalysis unremarkable  - EKG normal sinus rhythm, QTc 390 ms  - Head CT showed no acute changes  - HIV non reactive  - Treponema antibody non reactive  - ESR wnl   - Ceruloplasmin wnl   - BOOGIE negative  - Lyme negative      Parathyroid hormone:   85 (10/13)     Calcium:  11.4 (10/14) -> 10.3 (10/16) -> 9.8 (10/19) -> 10.6 (10/21) -> 10.3 (10/23)     Albumin:  4.3 (10/14) -->  4.3 (10/16) -> 4.1 (10/17) -> 4.2 (10/19) -> 4.5 (10/21) -> 4.3 (10/23)      Mental Status Exam:     Oriented to:  Person/Self  General:  Alert, eating in the lounge  Appearance:  Appears stated age, dressed in own clothing, overweight, grooming adequate with reported shower today  Behavior/Attitude: Cooperative, irritable   Eye Contact: Appropriate  Psychomotor: Normal and No evidence of tics, dystonia, or tardive dyskinesia  no catatonia present  Speech:  loud volume/tone  Language: Fluent in English with appropriate syntax and vocabulary.  Mood:  \"Fine\"  Affect:   appropriate and congruent with mood  Thought Process:   concrete, disorganized, confused  Thought Content:   No SI/HI/AH/VH;  No apparent delusions today  Associations:  loose  Insight:  impaired due to neurocognitive disorder   Judgment:  impaired due to neurocognitive disorder  Impulse control: impaired  Attention Span:  inattentive, limited  Concentration:   impaired by neurocognitive disorder  Recent and Remote Memory:   remote memory intact, recent memory impaired  Fund of Knowledge: average  Muscle Strength and Tone: normal  Gait and Station: Normal      Psychiatric Assessment     Estevan Aaron is a 64 year old male with previous psychiatric " diagnoses of GEORGINA admitted from the ER on 10/12/2024 due to concern for HI and psychosis in the context of medical issues (hyperparathyroidism, hypercalcemia), psychosocial stressors including with recent divorce and selling his home. This is the patient's first psychiatric hospitalization. The MSE on admission was pertinent for a thought process which was perseverative, circumstantial, tangential, disorganized and tangential; with rambling and looseness of associations. Psychological contributions to presentation included lack of insight. Social factors contributing to presentation included isolation, recent divorce, selling his house, and moving from hotel to hotel. Biological contributions to presentation included a history of hyperparathyroidism with chronic hypercalcemia per charts as well as a history of methamphetamine use per collateral from ex-wife.     Per collateral from ex-wife, Santos has had paranoid ideations since they first met. He has always felt like people are out to get him or trying to rip him off. She says that he has had visual hallucinations (he will point things out that the rest of the family can't see) for a long time, but the family would just go along with him to avoid making him angry. This timeline and presentation could be consistent with diagnosis of paranoid personality disorder. Things began to worsen around the beginning of the COVID pandemic, when Santos became more isolated and the family started to notice cognitive issues such as impaired memory. Other things that could be contributing to his presentation is hyperparathyroidism with hypercalcemia. However, patient's calcium returned to normal limits on 10/16, and patient continues to have disorganized behavior unrelated to calcium elevation, so likely this is not a significant contributing factor to patient's presentation.      Currently, Santos is at times disoriented, but easily re-directable. Presents with some difficulty of word  articulation, which contributes to his frustration when interacting with psych team, as he finds it difficult to explain himself. He occasionally has struggled to attend to ADLS particularly bathing and required frequent encouragement, likely due to underlying disorganized behavior. Overall, he is mostly independent in the unit. His confused behavior tends to increase in the evenings, seemingly related to a Neurocognitive Disorder diagnosis, and this could evolve to be his new baseline. He does not present as a danger to himself or others so his SIO was discontinued. Santos does not present with any new episodes of physical agitation and is able to attend groups and interact with peers without major altercations. Patient currently is stable with current neuroleptic doses in regards to improved paranoia. However, neuroleptics could be worsening his brain fog and may benefit from a down titration as a trial while awaiting placement. He is doing well with a reduction of Zyprexa and the plan is to discontinue it week of 2/10 before expected discharge on 2/24. Due to inability to care for self outside a supportive sxs, would benefit from continued hospital stay with discharge to group home/nursing home when available.          Psychiatric Plan by Diagnosis     Today's changes:  - Zyprexa 2.5 scheduled for 1700    # Major Neurocognitive Disorder  1. Medications:  - Trazodone 25 mg HS  - Zyprexa 2.5 mg  - PRN Zyprexa 5 mg TID     3. Additional Plans:  - Patient will be treated in therapeutic milieu with appropriate individual and group therapies as described  - Pending memory facility placement.      # Unspecified anxiety vs Generalized Anxiety Disorder  - Monitor for symptoms.  - Fluoxetine held due to suspicion of ongoing manic symptoms     Psychiatric Hospital Course:      Estevan Aaron was admitted to [facility] on a 72 hour hold.   Medications:  PTA fluoxetine was held due to concern for worsening of zelalem   New  medications started at the time of admission include Zyprexa.   Increased olanzapine 10 mg at bedtime was to 10 mg BID (10/14)   Increased olanzapine 10 mg BID to 10 mg during day and 15 mg at bedtime (10/15)  10/21: increased olanzapine pm dose from 15 to 20 mg  10/23: started melatonin 3 mg at 7 pm to help patient with circadian rhythm as he has been staying up throughout the night and sleeping a lot during the day and there is some concern for delirium   10/24: consulted anesthesia for MRI brain   10/28: MRI brain complete, decrease AM olanzapine from 10 to 5 mg  10/29: Morning olanzapine decreased to 2.5 mg, evening olanzapine decreased to 15 mg   11/1: Decreased evening olanzapine to 10 mg   11/4: Decreased evening olanzapine to 7.5 mg   11/5: Decreased evening olanzapine to 5 mg   11/11: Moved morning dose of olanzapine to the afternoon as patient appears more agitated in the afternoons/evenings  11/21: Started memantine 5 mg daily   11/26: Discontinued olanzapine 2.5 mg during the afternoon  12/2:   Discontinued memantine 5 mg, daily  2/5: as psychosis has improved with less paranoia, it was thought patient could benefit from a decrease in Zyprexa as it could be worsening is cognitive sxs and given black box warning for his age category. Decreased Zyprexa to 2.5mg at bedtime as a trial. Can restart if psychosis worsens. Seems to be tolerating this well.   2/10: Discontinued Zyprexa  2/18: Restarted Zyprexa 2.5 mg     The risks, benefits, alternatives, and side effects were discussed and understood by the patient and other caregivers.     Medical Assessment and Plan     Medical diagnoses to be addressed this admission:    # Hip Pain  - New right hip pain, and mild pain in multiple joints. Moderate response to topical antiinflammatory gel and lidocaine patch.   - Medicine consulted for recommendations 12/20    # CHF  # Hypertension  - Continue PTA medications  Furosemide 40 mg daily  Lisinopril 40 mg  daily  Metoprolol 50 mg daily  Amlodipine 10 mg daily  Clonidine 0.1 mg BID    # Hyperlipidemia  - Continue PTA Atorvastatin 40 mg     # Primary Hyperparathyroidism  # Hypercalcemia, hypophosphoremia   Increased Ca level to 10.9 and decreased Ph level to 2.3 in the ED   - Consulted endocrinology, who started cinacalcet 30 mg BID on 10/13  - 10/16 endocrinology recommended continuing to trend calcium and albumin to make sure patient does not become hypocalcemic and recommended holding cinacalcet   - 10/17, calcium and albumin wnl, endo recommended cinacalcet 30 mg once daily   - 10/21, per endo continue cincaclcet and recheck calcium and albumin on October 24  - 10/23: per endo, patient needs to follow up outpatient for further management of hyperparathyroidism     # Rhinorrhea  1/15 currently multiple COVID infections on unit. No other symptoms of COVID noted. COVID PCR - on 1/13.    #Chipped tooth: Pt chipped a molar at dinner on 2/4, denied pain. No bleeding.  - would benefit from outpt dental follow up.     Medical course: Patient was physically examined by the ED prior to being transferred to the unit and was found to be medically stable and appropriate for admission.      Consults: Psychiatry, Endocrinology (follow-up of hyperparathyroidism / hypercalcemia and hypertension), neurology     Checklist     Legal Status: Committed   [place] meds: Haldol, Clozaril, Risperdal, Invega, Zyprexa, Seroquel, Abilify    Safety Assessment:   Behavioral Orders   Procedures    Code 1 - Restrict to Unit    Routine Programming     As clinically indicated    Status 15     Every 15 minutes.    Status Individual Observation     Patient SIO status reviewed with team/RN.  Please also refer to RN/team documentation for add'l detail.    -SIO staff to monitor following which have contributed to patient being on SIO:  Patient intrusiveness to other patients  -Possible interventions SIO staff could use to support patient's treatment  progress:  Redirection  -When following observed, team will review discontinuation of SIO:  Improvement in intrusive behavior.     Order Specific Question:   CONTINUOUS 24 hours / day     Answer:   Other     Order Specific Question:   Specify distance     Answer:   10 feet     Order Specific Question:   Indications for SIO     Answer:   Severe intrusiveness    Status Individual Observation     Patient SIO status reviewed with team/RN.  Please also refer to RN/team documentation for add'l detail.    -SIO staff to monitor following which have contributed to patient being on SIO:  Being intrusive, vulnurable   -Possible interventions SIO staff could use to support patient's treatment progress:  Redirection, avoiding conflict with other pts  -When following observed, team will review discontinuation of SIO:  Pt is calm     Order Specific Question:   CONTINUOUS 24 hours / day     Answer:   Other     Order Specific Question:   Specify distance     Answer:   10 feet     Order Specific Question:   Indications for SIO     Answer:   Severe intrusiveness       Risk Assessment:  Risk for harm is low  Risk factors: impulsive and past behaviors  Protective factors: family      SIO: Yes, foe severe intrusiveness and being vulnurable    Disposition: Pending transfer to memory care facility.  Accepted to [facility] for 2/24.      Attestations   Sherrie Cosby MD  PGY1 Psychiatry Resident    Attestation:  This patient has been seen and evaluated by me, Pete Smith MD.  I have discussed this patient with the house staff team including the resident and/or medical student and I agree with the findings and plan in this note.    I have reviewed today's vital signs, medications, labs and imaging. Pete Smith MD , PhD.

## 2025-02-20 PROCEDURE — 250N000013 HC RX MED GY IP 250 OP 250 PS 637

## 2025-02-20 PROCEDURE — 124N000002 HC R&B MH UMMC

## 2025-02-20 PROCEDURE — 99231 SBSQ HOSP IP/OBS SF/LOW 25: CPT | Mod: GC | Performed by: PSYCHIATRY & NEUROLOGY

## 2025-02-20 RX ADMIN — OLANZAPINE 2.5 MG: 2.5 TABLET, FILM COATED ORAL at 18:04

## 2025-02-20 RX ADMIN — ATORVASTATIN CALCIUM 40 MG: 20 TABLET, FILM COATED ORAL at 08:40

## 2025-02-20 RX ADMIN — Medication 25 MG: at 20:16

## 2025-02-20 RX ADMIN — Medication 1 PATCH: at 08:46

## 2025-02-20 RX ADMIN — LISINOPRIL 40 MG: 10 TABLET ORAL at 08:41

## 2025-02-20 RX ADMIN — AMLODIPINE BESYLATE 10 MG: 5 TABLET ORAL at 08:41

## 2025-02-20 RX ADMIN — MELATONIN TAB 3 MG 3 MG: 3 TAB at 18:04

## 2025-02-20 RX ADMIN — CLONIDINE HYDROCHLORIDE 0.1 MG: 0.1 TABLET ORAL at 20:16

## 2025-02-20 RX ADMIN — CINACALCET 30 MG: 30 TABLET ORAL at 08:41

## 2025-02-20 RX ADMIN — FUROSEMIDE 40 MG: 40 TABLET ORAL at 08:41

## 2025-02-20 RX ADMIN — CLONIDINE HYDROCHLORIDE 0.1 MG: 0.1 TABLET ORAL at 08:40

## 2025-02-20 RX ADMIN — METOPROLOL SUCCINATE 50 MG: 50 TABLET, EXTENDED RELEASE ORAL at 08:41

## 2025-02-20 RX ADMIN — SENNOSIDES AND DOCUSATE SODIUM 1 TABLET: 50; 8.6 TABLET ORAL at 08:40

## 2025-02-20 ASSESSMENT — ACTIVITIES OF DAILY LIVING (ADL)
ADLS_ACUITY_SCORE: 77
HYGIENE/GROOMING: INDEPENDENT
ADLS_ACUITY_SCORE: 77
ADLS_ACUITY_SCORE: 66
ADLS_ACUITY_SCORE: 77
ADLS_ACUITY_SCORE: 66
ADLS_ACUITY_SCORE: 77
ADLS_ACUITY_SCORE: 66

## 2025-02-20 NOTE — PLAN OF CARE
Goal Outcome Evaluation:    Plan of Care Reviewed With: patient Plan of Care Reviewed With: patient    Overall Patient Progress: decliningOverall Patient Progress: declining         Problem: Adult Behavioral Health Plan of Care  Goal: Plan of Care Review  Outcome: Not Progressing  Flowsheets  Taken 2/19/2025 1850  Plan of Care Reviewed With: patient  Overall Patient Progress: declining  Patient Agreement with Plan of Care: refuses to participate  Taken 2/19/2025 1700  Patient Agreement with Plan of Care: (requires multiple prompt to complaint with POC)   agrees   agrees with comment (describe)     Pt was observed sitting on the lounge watching TV  majority of the shift.  Did not attend groups. Mood remains labile. Affect is flat and irritable. Pt gets angry at times and disorganized but able to re direct with help of 1:1. Pt denied pain and all mental health symptoms. Pt has poor insight to his mental illness. Does not cooperate well with assessment. Pt took all scheduled medicines with several prompts. Hygiene appears unkempt but pt refused to shower. Pt eats and drinking adequately.

## 2025-02-20 NOTE — PLAN OF CARE
Goal Outcome Evaluation:    Plan of Care Reviewed With: patient     Patient presented with a flat and blunted affect. His mood was calm. Patient delightfully came to the medication room window to ask for his morning meds which he gladly took. He denied pain or any discomfort. He denied all psych MH symptoms and contracted for safety. His hygiene was unkempt. He was asked to take a shower but he declined. He was eating and drinking adequately. Patient spent majority of the shift [facility]. He was mostly interacting with his SIO staff. There was no behavioral escalations or safety concerns noted this shift. Will continue the current plan of care and notify the provider of any concerns.

## 2025-02-20 NOTE — PLAN OF CARE
Team Note Due:  Monday    Assessment/Intervention/Current Symtoms and Care Coordination:  Chart review and met with team, discussed pt progress, symptomology, and response to treatment.  Discussed the discharge plan and any potential impediments to discharge. Clifford Aaron is emergency guardian/conservator until 02/27/2025. A petition for permanent guardianship/conservatorship has been filed and a hearing is scheduled for 2/27/25.    Connected with Kimber GRACE director at [facility] to discuss discharge medication preferences. She also requested recent progress notes which were sent via secure email. Awaiting clarification on how they'd like pt's discharge meds to be filled.    Clifford confirmed she signed paperwork at [facility] yesterday, so he should be all set to move on Monday.    Will schedule ambulance transportation tomorrow.    Discharge Plan or Goal:  Memory care facility     Barriers to Discharge:  Patient requires further psychiatric stabilization due to current symptomology, medication management with changes subject to provider, coordination with outside supports, and aftercare planning. Pt is under civil commitment.     Referral Status:  Memory Care approved:  [facility]. Marcus completed 11/30. Per Clifford on 1/8, she would like to move forward with a referral. Confirmed move in date is 2/24/25.  Vera (Exec Director): [phone]      Memory Care facilities currently in process:  [facility]. Tour completed 11/27.  [facility]. Tour scheduled 1/8/25. Have immediate openings and will accept EW. Family not requesting referral yet.  [facility]. Per Clifford on 1/20, she would like to move forward with a referral. Records sent 1/20.  [email or web address]    Memory Care facilities pending family review:  [facility].  [facility].  [facility].  [facility].  [facility].    Memory Care facilities declined:  [facility] (private pay only, no EW).  [facility] (private pay only, no EW).  [facility] (no openings).  [facility]. Tour completed 11/29. Records sent 12/2/24. Declined 12/19/24 (don't feel they are an appropriate facility for pt's needs).  Lorraine (): [email or web address]; [phone]  Isatu (director of nursing): [email or web address]  [facility].  Denied 12/26 (concerned about dementia with psychosis, history of hallucinations, high elopement risk, still on 1:1).  Nikki Noel: [email or web address]; Office [phone], Fax [phone]   [facility]. Not accepting EW as of 1/7/25.    Legal Status:  [facility] [place]  File Number: [id number]  Start and expiration date of commitment: 10/24/24 - 04/24/25    [facility] meds: Haldol, Clozaril, Risperdal, Invega, Zyprexa, Seroquel, Abilify    PPS/CM:  Shelby Chowdary: [phone]  [email or web address]    Contacts:  Maria C Aaron (Daughter): [phone]   Clifford Aaron (ex-wife): [phone]     No Del Angel (guardianship/conservatorship ): [phone]  [email or web address]    Caro Ross ([facility] probate court visitor for guardianship): [phone]  [email or web address]    Derrell Duff ([employer] ): [phone]  [email or web address]     Upcoming Meetings and Dates/Important Information and next steps:  Schedule ambulance transport when discharge is confirmed  Send discharge summary to [facility]  Clarify if they want pt discharged with meds or if meds should be sent to an outside pharmacy  Send PD/Discharge Summary to WAYNE  Send PD/COS to Lonnie Cannon    Provisional Discharge and Change of Status needed at discharge

## 2025-02-20 NOTE — PLAN OF CARE
BEH IP Unit Acuity Rating Score (UARS)  Patient is given one point for every criteria they meet.    CRITERIA SCORING   On a 72 hour hold, court hold, committed, stay of commitment, or revocation. 1    Patient LOS on BEH unit exceeds 20 days. 1  LOS: 131   Patient under guardianship, 55+, otherwise medically complex, or under age 11. 1   Suicide ideation without relief of precipitating factors. 0   Current plan for suicide. 0   Current plan for homicide. 0   Imminent risk or actual attempt to seriously harm another without relief of factors precipitating the attempt. 1   Severe dysfunction in daily living (ex: complete neglect for self care, extreme disruption in vegetative function, extreme deterioration in social interactions). 1   Recent (last 7 days) or current physical aggression in the ED or on unit. 0   Restraints or seclusion episode in past 72 hours. 0   Recent (last 7 days) or current verbal aggression, agitation, yelling, etc., while in the ED or unit. 0   Active psychosis. 1   Need for constant or near constant redirection (from leaving, from others, etc).  0   Intrusive or disruptive behaviors. 0   Patient requires 3 or more hours of individualized nursing care per 8-hour shift (i.e. for ADLs, meds, therapeutic interventions). 0   TOTAL 6

## 2025-02-20 NOTE — PLAN OF CARE
Problem: Sleep Disturbance  Goal: Adequate Sleep/Rest  Outcome: Progressing   Goal Outcome Evaluation:       Pt slept for 6.25 hours this shift. No c/o pain or discomfort. No PRN given. Pt had a quiet night.  He continues on SIO 1:1 for severe intrusiveness. No behavioral escalation noted or reported.     Blood pressure 122/72, pulse 62, temperature 97.9  F (36.6  C), temperature source Oral, resp. rate 18, weight 114.1 kg (251 lb 9.6 oz), SpO2 94%.

## 2025-02-20 NOTE — PROGRESS NOTES
----------------------------------------------------------------------------------------------------------  [facility]  Psychiatry Progress Note  Hospital Day #131     Interim History:     The patient's care was discussed with the treatment team and chart notes were reviewed.    Identifier: Estevan Aaron is a 64 year old male with previous psychiatric diagnoses of  generalized anxiety disorder, Neurocognitive disorder, admitted from the ED 10/12/2024 due to concern for HI and psychosis, which now have resolved, in the psychosocial stressors (recent divorce).     Sleep: 6.25 hours (02/20/25 0600)  Scheduled medications: Took all scheduled medications as prescribed  Psychiatric PRN medications:  none       Staff Report:   Pt slept for 6.25 hours this shift. No c/o pain or discomfort. No PRN given. Pt had a quiet night.  He continues on SIO 1:1 for severe intrusiveness. No behavioral escalation noted or reported.      Subjective:     Patient Interview:  Santos was seen pacing in the milieu. Sates that he is doing well and denies any SI/HI/AVH. He asks if we drove here this morning and becomes confused after that.     ROS:  Patient denies acute concerns apart from foot pain and numbness noted above.     Objective:     Vitals:  /82   Pulse 63   Temp 97.8  F (36.6  C) (Oral)   Resp 18   Wt 114 kg (251 lb 6.4 oz)   SpO2 95%   BMI 40.58 kg/m      Allergies:  No Known Allergies    Current Medications:  Scheduled:  Current Facility-Administered Medications   Medication Dose Route Frequency Provider Last Rate Last Admin    acetaminophen (TYLENOL) tablet 650 mg  650 mg Oral Q4H PRN Nikki Caceres MD   650 mg at 02/17/25 0757    alum & mag hydroxide-simethicone (MAALOX) suspension 30 mL  30 mL Oral Q4H PRN Nikki Caceres MD   30 mL at 10/17/24 0837    amLODIPine (NORVASC) tablet 10 mg  10 mg Oral Daily Dishno, Presley, MD   10 mg at 02/20/25 0841    atorvastatin  (LIPITOR) tablet 40 mg  40 mg Oral Daily Nikki Caceres MD   40 mg at 02/20/25 0840    cholecalciferol (VITAMIN D3) capsule 1,250 mcg  1,250 mcg Oral Q7 Days Evelia Grimm DO   1,250 mcg at 02/18/25 1010    cinacalcet (SENSIPAR) tablet 30 mg  30 mg Oral Daily Presley Barrera MD   30 mg at 02/20/25 0841    cloNIDine (CATAPRES) tablet 0.1 mg  0.1 mg Oral BID Presley Barrera MD   0.1 mg at 02/20/25 0840    diclofenac (VOLTAREN) 1 % topical gel 2 g  2 g Topical 4x Daily PRN Rocío Lopez MD   2 g at 12/22/24 2032    furosemide (LASIX) tablet 40 mg  40 mg Oral Daily Presley Barrera MD   40 mg at 02/20/25 0841    gabapentin (NEURONTIN) capsule 100 mg  100 mg Oral Q6H PRN Nikki Caceres MD   100 mg at 12/24/24 0046    hydrocortisone (CORTAID) 0.5 % cream   Topical Daily PRN Savi Chamorro MD        Lidocaine (LIDOCARE) 4 % Patch 1 patch  1 patch Transdermal Q24H PRN Rocío Lopez MD   1 patch at 12/22/24 2023    lisinopril (ZESTRIL) tablet 40 mg  40 mg Oral Daily Presley Barrera MD   40 mg at 02/20/25 0841    melatonin tablet 3 mg  3 mg Oral At Bedtime Presley Barrera MD   3 mg at 02/19/25 2012    metoprolol succinate ER (TOPROL XL) 24 hr tablet 50 mg  50 mg Oral Daily Presley Barrera MD   50 mg at 02/20/25 0841    nicotine (NICODERM CQ) 14 MG/24HR 24 hr patch 1 patch  1 patch Transdermal Daily Evelia Grimm DO   1 patch at 02/20/25 0846    nicotine (NICORETTE) gum 2 mg  2 mg Buccal Q1H PRN González Garcia MD   2 mg at 10/24/24 1254    OLANZapine (zyPREXA) tablet 2.5 mg  2.5 mg Oral At Bedtime Sherrie Cosby MD   2.5 mg at 02/19/25 1726    Or    OLANZapine (zyPREXA) injection 5 mg  5 mg Intramuscular At Bedtime Sherrie Cosby MD        OLANZapine (zyPREXA) tablet 5 mg  5 mg Oral TID PRN Evelia Grimm DO   5 mg at 02/19/25 1023    Or    OLANZapine (zyPREXA) injection 5 mg  5 mg Intramuscular TID PRN Evelia Grimm DO        polyethylene glycol (MIRALAX) Packet 17 g  17 g  Oral Daily PRN Nikki Caceres MD        senna-docusate (SENOKOT-S/PERICOLACE) 8.6-50 MG per tablet 1 tablet  1 tablet Oral Daily Evelia Grimm DO   1 tablet at 02/20/25 0840    sodium chloride (DWAIN 128) 2 % ophthalmic solution 1 drop  1 drop Both Eyes Q3H PRN Kat Aguilar MD   1 drop at 02/15/25 1205    traZODone (DESYREL) half-tab 25 mg  25 mg Oral At Bedtime Rocío Lopez MD   25 mg at 02/19/25 2012    traZODone (DESYREL) half-tab 25 mg  25 mg Oral At Bedtime PRN Evelia Grimm DO   25 mg at 12/24/24 0046       PRN:  Current Facility-Administered Medications   Medication Dose Route Frequency Provider Last Rate Last Admin    acetaminophen (TYLENOL) tablet 650 mg  650 mg Oral Q4H PRN Nikki Caceres MD   650 mg at 02/17/25 0757    alum & mag hydroxide-simethicone (MAALOX) suspension 30 mL  30 mL Oral Q4H PRN Nikki Caceres MD   30 mL at 10/17/24 0837    amLODIPine (NORVASC) tablet 10 mg  10 mg Oral Daily Presley Barrera MD   10 mg at 02/20/25 0841    atorvastatin (LIPITOR) tablet 40 mg  40 mg Oral Daily Nikki Caceres MD   40 mg at 02/20/25 0840    cholecalciferol (VITAMIN D3) capsule 1,250 mcg  1,250 mcg Oral Q7 Days Evelia Grimm DO   1,250 mcg at 02/18/25 1010    cinacalcet (SENSIPAR) tablet 30 mg  30 mg Oral Daily Presley Barrera MD   30 mg at 02/20/25 0841    cloNIDine (CATAPRES) tablet 0.1 mg  0.1 mg Oral BID Presley Barrera MD   0.1 mg at 02/20/25 0840    diclofenac (VOLTAREN) 1 % topical gel 2 g  2 g Topical 4x Daily PRN Rocío Lopez MD   2 g at 12/22/24 2032    furosemide (LASIX) tablet 40 mg  40 mg Oral Daily Presley Barrera MD   40 mg at 02/20/25 0841    gabapentin (NEURONTIN) capsule 100 mg  100 mg Oral Q6H PRN Nikki Caceres MD   100 mg at 12/24/24 0046    hydrocortisone (CORTAID) 0.5 % cream   Topical Daily PRN Savi Chamorro MD        Lidocaine (LIDOCARE) 4 % Patch 1 patch  1 patch Transdermal Q24H PRN Rocío Lopez  Gregorio Martinez MD   1 patch at 12/22/24 2023    lisinopril (ZESTRIL) tablet 40 mg  40 mg Oral Daily Presley Barrera MD   40 mg at 02/20/25 0841    melatonin tablet 3 mg  3 mg Oral At Bedtime Presley Barrera MD   3 mg at 02/19/25 2012    metoprolol succinate ER (TOPROL XL) 24 hr tablet 50 mg  50 mg Oral Daily Presley Barrera MD   50 mg at 02/20/25 0841    nicotine (NICODERM CQ) 14 MG/24HR 24 hr patch 1 patch  1 patch Transdermal Daily Evelia Grimm DO   1 patch at 02/20/25 0846    nicotine (NICORETTE) gum 2 mg  2 mg Buccal Q1H PRN González Garcia MD   2 mg at 10/24/24 1254    OLANZapine (zyPREXA) tablet 2.5 mg  2.5 mg Oral At Bedtime Sherrie Cosby MD   2.5 mg at 02/19/25 1726    Or    OLANZapine (zyPREXA) injection 5 mg  5 mg Intramuscular At Bedtime Sherrie Cosby MD        OLANZapine (zyPREXA) tablet 5 mg  5 mg Oral TID PRN Evelia Grimm DO   5 mg at 02/19/25 1023    Or    OLANZapine (zyPREXA) injection 5 mg  5 mg Intramuscular TID PRN Evelia Grimm DO        polyethylene glycol (MIRALAX) Packet 17 g  17 g Oral Daily PRN Nikki Caceres MD        senna-docusate (SENOKOT-S/PERICOLACE) 8.6-50 MG per tablet 1 tablet  1 tablet Oral Daily Evelia Grimm DO   1 tablet at 02/20/25 0840    sodium chloride (DWAIN 128) 2 % ophthalmic solution 1 drop  1 drop Both Eyes Q3H PRN Kat Aguilar MD   1 drop at 02/15/25 1205    traZODone (DESYREL) half-tab 25 mg  25 mg Oral At Bedtime Rocío Lopez MD   25 mg at 02/19/25 2012    traZODone (DESYREL) half-tab 25 mg  25 mg Oral At Bedtime PRN Evelia Grimm DO   25 mg at 12/24/24 0046     Labs and Imaging:  Data this admission:  - CBC unremarkable  - CMP unremarkable  - TSH normal  - UDS negative  - Vit D low  - Hgb A1c 5.9 (10/13/24)  - Lipids unremarkable  - Vit B12 normal  - Folate normal  - Urinalysis unremarkable  - EKG normal sinus rhythm, QTc 390 ms  - Head CT showed no acute changes  - HIV non reactive  - Treponema antibody non reactive  - ESR wnl   -  "Ceruloplasmin wnl   - BOOGIE negative  - Lyme negative      Parathyroid hormone:   85 (10/13)     Calcium:  11.4 (10/14) -> 10.3 (10/16) -> 9.8 (10/19) -> 10.6 (10/21) -> 10.3 (10/23)     Albumin:  4.3 (10/14) -->  4.3 (10/16) -> 4.1 (10/17) -> 4.2 (10/19) -> 4.5 (10/21) -> 4.3 (10/23)      Mental Status Exam:     Oriented to:  Person/Self  General:  Alert, eating in the lounge  Appearance:  Appears stated age, dressed in own clothing, overweight, grooming adequate with reported shower today  Behavior/Attitude: Cooperative, irritable   Eye Contact: Appropriate  Psychomotor: Normal and No evidence of tics, dystonia, or tardive dyskinesia  no catatonia present  Speech:  loud volume/tone  Language: Fluent in English with appropriate syntax and vocabulary.  Mood:  \"Fine\"  Affect:   appropriate and congruent with mood  Thought Process:   concrete, disorganized, confused  Thought Content:   No SI/HI/AH/VH;  No apparent delusions today  Associations:  loose  Insight:  impaired due to neurocognitive disorder   Judgment:  impaired due to neurocognitive disorder  Impulse control: impaired  Attention Span:  inattentive, limited  Concentration:   impaired by neurocognitive disorder  Recent and Remote Memory:   remote memory intact, recent memory impaired  Fund of Knowledge: average  Muscle Strength and Tone: normal  Gait and Station: Normal      Psychiatric Assessment     Estevan Aaron is a 64 year old male with previous psychiatric diagnoses of GEORGINA admitted from the ER on 10/12/2024 due to concern for HI and psychosis in the context of medical issues (hyperparathyroidism, hypercalcemia), psychosocial stressors including with recent divorce and selling his home. This is the patient's first psychiatric hospitalization. The MSE on admission was pertinent for a thought process which was perseverative, circumstantial, tangential, disorganized and tangential; with rambling and looseness of associations. Psychological contributions to " presentation included lack of insight. Social factors contributing to presentation included isolation, recent divorce, selling his house, and moving from hotel to hotel. Biological contributions to presentation included a history of hyperparathyroidism with chronic hypercalcemia per charts as well as a history of methamphetamine use per collateral from ex-wife.     Per collateral from ex-wife, Santos has had paranoid ideations since they first met. He has always felt like people are out to get him or trying to rip him off. She says that he has had visual hallucinations (he will point things out that the rest of the family can't see) for a long time, but the family would just go along with him to avoid making him angry. This timeline and presentation could be consistent with diagnosis of paranoid personality disorder. Things began to worsen around the beginning of the COVID pandemic, when Santos became more isolated and the family started to notice cognitive issues such as impaired memory. Other things that could be contributing to his presentation is hyperparathyroidism with hypercalcemia. However, patient's calcium returned to normal limits on 10/16, and patient continues to have disorganized behavior unrelated to calcium elevation, so likely this is not a significant contributing factor to patient's presentation.      Currently, Santos is at times disoriented, but easily re-directable. Presents with some difficulty of word articulation, which contributes to his frustration when interacting with psych team, as he finds it difficult to explain himself. He occasionally has struggled to attend to ADLS particularly bathing and required frequent encouragement, likely due to underlying disorganized behavior. Overall, he is mostly independent in the unit. His confused behavior tends to increase in the evenings, seemingly related to a Neurocognitive Disorder diagnosis, and this could evolve to be his new baseline. He does not  present as a danger to himself or others so his SIO was discontinued. Santos does not present with any new episodes of physical agitation and is able to attend groups and interact with peers without major altercations. Patient currently is stable with current neuroleptic doses in regards to improved paranoia. However, neuroleptics could be worsening his brain fog and may benefit from a down titration as a trial while awaiting placement. He is doing well with a reduction of Zyprexa and the plan is to discontinue it week of 2/10 before expected discharge on 2/24. Due to inability to care for self outside a supportive sxs, would benefit from continued hospital stay with discharge to group home/nursing home when available.          Psychiatric Plan by Diagnosis     Today's changes:  - No changes    # Major Neurocognitive Disorder  1. Medications:  - Trazodone 25 mg HS  - Zyprexa 2.5 mg  - PRN Zyprexa 5 mg TID     3. Additional Plans:  - Patient will be treated in therapeutic milieu with appropriate individual and group therapies as described  - Pending memory facility placement.      # Unspecified anxiety vs Generalized Anxiety Disorder  - Monitor for symptoms.  - Fluoxetine held due to suspicion of ongoing manic symptoms     Psychiatric Hospital Course:      Estevan Aaron was admitted to [facility] on a 72 hour hold.   Medications:  PTA fluoxetine was held due to concern for worsening of zelaelm   New medications started at the time of admission include Zyprexa.   Increased olanzapine 10 mg at bedtime was to 10 mg BID (10/14)   Increased olanzapine 10 mg BID to 10 mg during day and 15 mg at bedtime (10/15)  10/21: increased olanzapine pm dose from 15 to 20 mg  10/23: started melatonin 3 mg at 7 pm to help patient with circadian rhythm as he has been staying up throughout the night and sleeping a lot during the day and there is some concern for delirium   10/24: consulted anesthesia for MRI brain   10/28: MRI brain complete,  decrease AM olanzapine from 10 to 5 mg  10/29: Morning olanzapine decreased to 2.5 mg, evening olanzapine decreased to 15 mg   11/1: Decreased evening olanzapine to 10 mg   11/4: Decreased evening olanzapine to 7.5 mg   11/5: Decreased evening olanzapine to 5 mg   11/11: Moved morning dose of olanzapine to the afternoon as patient appears more agitated in the afternoons/evenings  11/21: Started memantine 5 mg daily   11/26: Discontinued olanzapine 2.5 mg during the afternoon  12/2:   Discontinued memantine 5 mg, daily  2/5: as psychosis has improved with less paranoia, it was thought patient could benefit from a decrease in Zyprexa as it could be worsening is cognitive sxs and given black box warning for his age category. Decreased Zyprexa to 2.5mg at bedtime as a trial. Can restart if psychosis worsens. Seems to be tolerating this well.   2/10: Discontinued Zyprexa  2/18: Restarted Zyprexa 2.5 mg     The risks, benefits, alternatives, and side effects were discussed and understood by the patient and other caregivers.     Medical Assessment and Plan     Medical diagnoses to be addressed this admission:    # Hip Pain  - New right hip pain, and mild pain in multiple joints. Moderate response to topical antiinflammatory gel and lidocaine patch.   - Medicine consulted for recommendations 12/20    # CHF  # Hypertension  - Continue PTA medications  Furosemide 40 mg daily  Lisinopril 40 mg daily  Metoprolol 50 mg daily  Amlodipine 10 mg daily  Clonidine 0.1 mg BID    # Hyperlipidemia  - Continue PTA Atorvastatin 40 mg     # Primary Hyperparathyroidism  # Hypercalcemia, hypophosphoremia   Increased Ca level to 10.9 and decreased Ph level to 2.3 in the ED   - Consulted endocrinology, who started cinacalcet 30 mg BID on 10/13  - 10/16 endocrinology recommended continuing to trend calcium and albumin to make sure patient does not become hypocalcemic and recommended holding cinacalcet   - 10/17, calcium and albumin wnl, endo  recommended cinacalcet 30 mg once daily   - 10/21, per endo continue cincaclcet and recheck calcium and albumin on October 24  - 10/23: per endo, patient needs to follow up outpatient for further management of hyperparathyroidism     # Rhinorrhea  1/15 currently multiple COVID infections on unit. No other symptoms of COVID noted. COVID PCR - on 1/13.    #Chipped tooth: Pt chipped a molar at dinner on 2/4, denied pain. No bleeding.  - would benefit from outpt dental follow up.     Medical course: Patient was physically examined by the ED prior to being transferred to the unit and was found to be medically stable and appropriate for admission.      Consults: Psychiatry, Endocrinology (follow-up of hyperparathyroidism / hypercalcemia and hypertension), neurology     Checklist     Legal Status: Committed   [facility] meds: Haldol, Clozaril, Risperdal, Invega, Zyprexa, Seroquel, Abilify    Safety Assessment:   Behavioral Orders   Procedures    Code 1 - Restrict to Unit    Routine Programming     As clinically indicated    Status 15     Every 15 minutes.    Status Individual Observation     Patient SIO status reviewed with team/RN.  Please also refer to RN/team documentation for add'l detail.    -SIO staff to monitor following which have contributed to patient being on SIO:  Patient intrusiveness to other patients  -Possible interventions SIO staff could use to support patient's treatment progress:  Redirection  -When following observed, team will review discontinuation of SIO:  Improvement in intrusive behavior.     Order Specific Question:   CONTINUOUS 24 hours / day     Answer:   Other     Order Specific Question:   Specify distance     Answer:   10 feet     Order Specific Question:   Indications for SIO     Answer:   Severe intrusiveness    Status Individual Observation     Patient SIO status reviewed with team/RN.  Please also refer to RN/team documentation for add'l detail.    -SIO staff to monitor following which have  contributed to patient being on SIO:  Being intrusive, vulnurable   -Possible interventions SIO staff could use to support patient's treatment progress:  Redirection, avoiding conflict with other pts  -When following observed, team will review discontinuation of SIO:  Pt is calm     Order Specific Question:   CONTINUOUS 24 hours / day     Answer:   Other     Order Specific Question:   Specify distance     Answer:   10 feet     Order Specific Question:   Indications for SIO     Answer:   Severe intrusiveness       Risk Assessment:  Risk for harm is low  Risk factors: impulsive and past behaviors  Protective factors: family      SIO: Yes, foe severe intrusiveness and being vulnurable    Disposition: Pending transfer to memory care facility.  Accepted to [facility] for 2/24.      Attestations   Sherrie Cosby MD  PGY1 Psychiatry Resident    Attestation:  This patient has been seen and evaluated by me, Pete Smith MD.  I have discussed this patient with the house staff team including the resident and/or medical student and I agree with the findings and plan in this note.    I have reviewed today's vital signs, medications, labs and imaging. Pete Smith MD , PhD.

## 2025-02-21 PROCEDURE — 124N000002 HC R&B MH UMMC

## 2025-02-21 PROCEDURE — 250N000013 HC RX MED GY IP 250 OP 250 PS 637

## 2025-02-21 PROCEDURE — 99231 SBSQ HOSP IP/OBS SF/LOW 25: CPT | Mod: GC | Performed by: PSYCHIATRY & NEUROLOGY

## 2025-02-21 RX ORDER — OLANZAPINE 2.5 MG/1
2.5 TABLET, FILM COATED ORAL AT BEDTIME
Qty: 30 TABLET | Refills: 0 | Status: SHIPPED | OUTPATIENT
Start: 2025-02-21 | End: 2025-03-23

## 2025-02-21 RX ORDER — CLONIDINE HYDROCHLORIDE 0.1 MG/1
0.1 TABLET ORAL 2 TIMES DAILY
Qty: 60 TABLET | Refills: 0 | Status: SHIPPED | OUTPATIENT
Start: 2025-02-21 | End: 2025-03-23

## 2025-02-21 RX ORDER — CHOLECALCIFEROL (VITAMIN D3) 1250 MCG
1250 CAPSULE ORAL
Qty: 4 CAPSULE | Refills: 0 | Status: SHIPPED | OUTPATIENT
Start: 2025-02-25 | End: 2025-03-27

## 2025-02-21 RX ORDER — FUROSEMIDE 40 MG/1
40 TABLET ORAL DAILY
Qty: 30 TABLET | Refills: 0 | Status: SHIPPED | OUTPATIENT
Start: 2025-02-21 | End: 2025-03-23

## 2025-02-21 RX ORDER — AMLODIPINE BESYLATE 10 MG/1
10 TABLET ORAL DAILY
Qty: 30 TABLET | Refills: 0 | Status: SHIPPED | OUTPATIENT
Start: 2025-02-21 | End: 2025-03-23

## 2025-02-21 RX ORDER — METOPROLOL SUCCINATE 50 MG/1
50 TABLET, EXTENDED RELEASE ORAL DAILY
Qty: 30 TABLET | Refills: 0 | Status: SHIPPED | OUTPATIENT
Start: 2025-02-21 | End: 2025-03-23

## 2025-02-21 RX ORDER — CINACALCET 30 MG/1
30 TABLET, FILM COATED ORAL DAILY
Qty: 30 TABLET | Refills: 0 | Status: SHIPPED | OUTPATIENT
Start: 2025-02-21 | End: 2025-03-23

## 2025-02-21 RX ORDER — LISINOPRIL 40 MG/1
40 TABLET ORAL DAILY
Qty: 30 TABLET | Refills: 0 | Status: SHIPPED | OUTPATIENT
Start: 2025-02-21 | End: 2025-03-23

## 2025-02-21 RX ORDER — AMOXICILLIN 250 MG
1 CAPSULE ORAL DAILY
Qty: 30 TABLET | Refills: 0 | Status: SHIPPED | OUTPATIENT
Start: 2025-02-21 | End: 2025-03-23

## 2025-02-21 RX ADMIN — MELATONIN TAB 3 MG 3 MG: 3 TAB at 20:09

## 2025-02-21 RX ADMIN — CLONIDINE HYDROCHLORIDE 0.1 MG: 0.1 TABLET ORAL at 20:09

## 2025-02-21 RX ADMIN — CLONIDINE HYDROCHLORIDE 0.1 MG: 0.1 TABLET ORAL at 08:50

## 2025-02-21 RX ADMIN — CINACALCET 30 MG: 30 TABLET ORAL at 08:49

## 2025-02-21 RX ADMIN — SENNOSIDES AND DOCUSATE SODIUM 1 TABLET: 50; 8.6 TABLET ORAL at 08:50

## 2025-02-21 RX ADMIN — FUROSEMIDE 40 MG: 40 TABLET ORAL at 08:50

## 2025-02-21 RX ADMIN — METOPROLOL SUCCINATE 50 MG: 50 TABLET, EXTENDED RELEASE ORAL at 08:50

## 2025-02-21 RX ADMIN — Medication 1 PATCH: at 09:21

## 2025-02-21 RX ADMIN — ATORVASTATIN CALCIUM 40 MG: 20 TABLET, FILM COATED ORAL at 08:50

## 2025-02-21 RX ADMIN — OLANZAPINE 2.5 MG: 2.5 TABLET, FILM COATED ORAL at 17:57

## 2025-02-21 RX ADMIN — Medication 25 MG: at 20:09

## 2025-02-21 RX ADMIN — LISINOPRIL 40 MG: 10 TABLET ORAL at 08:49

## 2025-02-21 RX ADMIN — AMLODIPINE BESYLATE 10 MG: 5 TABLET ORAL at 08:49

## 2025-02-21 ASSESSMENT — ACTIVITIES OF DAILY LIVING (ADL)
LAUNDRY: UNABLE TO COMPLETE
HYGIENE/GROOMING: HANDWASHING;SHOWER;INDEPENDENT
ADLS_ACUITY_SCORE: 76
ADLS_ACUITY_SCORE: 66
HYGIENE/GROOMING: INDEPENDENT
ORAL_HYGIENE: INDEPENDENT
ADLS_ACUITY_SCORE: 66
ADLS_ACUITY_SCORE: 76
ADLS_ACUITY_SCORE: 66
ADLS_ACUITY_SCORE: 76
ADLS_ACUITY_SCORE: 66
LAUNDRY: UNABLE TO COMPLETE
ADLS_ACUITY_SCORE: 66
ADLS_ACUITY_SCORE: 76
ADLS_ACUITY_SCORE: 66
DRESS: STREET CLOTHES;INDEPENDENT;PROMPTS
ADLS_ACUITY_SCORE: 66
ADLS_ACUITY_SCORE: 76
ADLS_ACUITY_SCORE: 66
ADLS_ACUITY_SCORE: 76
ADLS_ACUITY_SCORE: 66
ADLS_ACUITY_SCORE: 76
DRESS: STREET CLOTHES;INDEPENDENT
ADLS_ACUITY_SCORE: 66
ORAL_HYGIENE: INDEPENDENT;WITH ASSISTANCE
ADLS_ACUITY_SCORE: 66

## 2025-02-21 NOTE — PLAN OF CARE
Goal Outcome Evaluation:    Plan of Care Reviewed With: patient Plan of Care Reviewed With: patient    Overall Patient Progress: improvingOverall Patient Progress: improving       Problem: Adult Behavioral Health Plan of Care  Goal: Plan of Care Review  Outcome: Progressing  Flowsheets  Taken 2/21/2025 1141  Plan of Care Reviewed With: patient  Overall Patient Progress: improving  Patient Agreement with Plan of Care: agrees with comment (describe)  Taken 2/21/2025 1112  Patient Agreement with Plan of Care: refuses to participate  Note: Pt is intermittently confused, argumentative and impulse control     Pt remains disorganized and labile and irritable mood. Affect is restricted and requires lot of redirections. Pt is able redirectable with SIO 1:1 help. Pt was observed sitting on the lounge watching TV  majority of the shift.  Did not attend groups. Pt denied pain and all mental health symptoms. Pt has poor insight to his mental illness. Does not cooperate well with assessment. Pt took all scheduled medicines without prompts. Hygiene appears unkempt but pt refused to shower. Pt eats and drinking adequately.       Discharge plans:   Pt will be discharged on Monday. Scheduled ambulance transport for 10:30am  on Monday 2/24 to  Memory care facility.

## 2025-02-21 NOTE — PLAN OF CARE
BEH IP Unit Acuity Rating Score (UARS)  Patient is given one point for every criteria they meet.    CRITERIA SCORING   On a 72 hour hold, court hold, committed, stay of commitment, or revocation. 1    Patient LOS on BEH unit exceeds 20 days. 1  LOS: 132   Patient under guardianship, 55+, otherwise medically complex, or under age 11. 1   Suicide ideation without relief of precipitating factors. 0   Current plan for suicide. 0   Current plan for homicide. 0   Imminent risk or actual attempt to seriously harm another without relief of factors precipitating the attempt. 1   Severe dysfunction in daily living (ex: complete neglect for self care, extreme disruption in vegetative function, extreme deterioration in social interactions). 1   Recent (last 7 days) or current physical aggression in the ED or on unit. 0   Restraints or seclusion episode in past 72 hours. 0   Recent (last 7 days) or current verbal aggression, agitation, yelling, etc., while in the ED or unit. 0   Active psychosis. 1   Need for constant or near constant redirection (from leaving, from others, etc).  0   Intrusive or disruptive behaviors. 0   Patient requires 3 or more hours of individualized nursing care per 8-hour shift (i.e. for ADLs, meds, therapeutic interventions). 0   TOTAL 6

## 2025-02-21 NOTE — PLAN OF CARE
Problem: Sleep Disturbance  Goal: Adequate Sleep/Rest  Outcome: Progressing    Pt appears to have slept for 4.75  hours.  He woke up intermittently and sat/slept in the lounge area. He refused redirection to sleep continuously on the bed in his room. Pt denies pain, anxiety, depression, and SI/SIB. No PRN medications were given. Pt is on SI, 1:1 for severe intrusiveness. 15 minutes safety checks were in place. Staff will continue to offer support to pt.

## 2025-02-21 NOTE — PLAN OF CARE
Problem: Adult Behavioral Health Plan of Care  Goal: Plan of Care Review  2/20/2025 2313 by Litzy Rodgers RN  Outcome: Not Progressing   Goal Outcome Evaluation:    Plan of Care Reviewed With: patient          Pt was intermittently visible in the milieu. He was observed pacing back and forth his room and the lounge. His mood is labile and affect was flat, blunted and easily irritable. He was intermittently confused with flight of ideas upon interacting with patient. His thoughts was disorganized but easily redirectable. He had minimal interaction with peers or staff. He kept to himself while out in the lounge. Hygiene was poor looking very unkempt. Encouraged to take a shower but he refused. Intake was adequate. He was out for dinner and snacks and ate 100%. He was easily irritable and dismissive with assessment questions. He denied pain and all mental health psych symptoms. Pt has no insight of his situation. He is in denial and very paranoid. He was noted dosing on and off in the lounge. He was encouraged to go to his room to lay in bed but he got angry and irritated. He was medication compliant. No prn given this shift. He continued on SIO 10 Ft for severe intrusiveness. No intrusive behavior was noted this shift. Vitals were WNL. Will continue to monitor and will assist if need arise.

## 2025-02-21 NOTE — PLAN OF CARE
"Individual Therapy Note    Session duration in minutes: 8    Pt progress: Met with Pt in [facility] to check in and engage in safety planning. Pt reports he does not believe he is ever going to discharge but if he did he said that would be \"good.\" Pt was pleasant, with prompting able to identify coping skills, at times had fragmented speech or incomplete sentences. Pt appeared to have difficulty future thinking or recalling healthy supports.    Therapeutic Intervention(s):   Provided active listening, unconditional positive regard, and validation.   Engaged in safety planning identifying coping skills, warning signs, health support and resources      No Charge (0-15 min)        "

## 2025-02-21 NOTE — PLAN OF CARE
Team Note Due:  Monday    Assessment/Intervention/Current Symtoms and Care Coordination:  Chart review and met with team, discussed pt progress, symptomology, and response to treatment.  Discussed the discharge plan and any potential impediments to discharge. Clifford Aaron is emergency guardian/conservator until 02/27/2025. A petition for permanent guardianship/conservatorship has been filed and a hearing is scheduled for 2/27/25.    Kimber requested pt be discharged with the meds he has here and the MD plan of care provided med orders for future meds. MD updated to order discharge meds. Confirmed ambulance transport will be scheduled and goal will be to have pt to [facility] by 12pm.    Scheduled ambulance transport for 10:30am  on Monday 2/24.     Updated family on timeline for dicsharge Monday. Clifford confirmed her, Maria C, and Patel will be meeting pt at [facility] when he arrives.    Updated CM on plan for discharge Monday. Will send PD and discharge summary.    PD and COS drafted for Monday.    Discharge Plan or Goal:  Memory care facility     Barriers to Discharge:  Patient requires further psychiatric stabilization due to current symptomology, medication management with changes subject to provider, coordination with outside supports, and aftercare planning. Pt is under civil commitment.     Referral Status:  Memory Care approved:  [facility]. Marcus completed 11/30. Per Clifford on 1/8, she would like to move forward with a referral. Confirmed move in date is 2/24/25.  Vera (Exec Director): [phone]      Memory Care facilities currently in process:  [facility]. Tour completed 11/27.  [facility]. Tour scheduled 1/8/25. Have immediate openings and will accept EW. Family not requesting referral yet.  [facility]. Per Clifford on 1/20, she would like to move forward with a referral. Records sent  1/20.  [email or web address]    Memory Care facilities pending family review:  [facility].  [facility].  [facility].  [facility].  [facility].    Memory Care facilities declined:  [facility] (private pay only, no EW).  [facility] [address] (private pay only, no EW).  [facility] (no openings).  [facility]. Tour completed 11/29. Records sent 12/2/24. Declined 12/19/24 (don't feel they are an appropriate facility for pt's needs).  Lorraine (): [email or web address]; [phone]  Isatu (director of nursing): [email or web address]  [facility].  Denied 12/26 (concerned about dementia with psychosis, history of hallucinations, high elopement risk, still on 1:1).  Nikki Noel: [email or web address]; Office [phone], Fax [phone]   [facility]. Not accepting EW as of 1/7/25.    Legal Status:  MI Commitment with [facility]  File Number: [id number]  Start and expiration date of commitment: 10/24/24 - 04/24/25    [facility] meds: Haldol, Clozaril, Risperdal, Invega, Zyprexa, Seroquel, Abilify    PPS/CM:  Shelby Chowdary: [phone]  [email or web address]    Contacts:  Maria C Aaron (Daughter): [phone]   Clifford Aaron (ex-wife): [phone]     No Del Angel (guardianship/conservatorship ): [phone]  [email or web address]    Caro Ross ([facility] probate court visitor for guardianship): [phone]  [email or web address]    Derrell Duff ([employer] ): [phone]  [email or web address]     Upcoming Meetings and Dates/Important Information and next steps:  Send PD/Discharge Summary to   Send PD/COS to [facility]    Provisional Discharge and Change of Status needed at discharge

## 2025-02-21 NOTE — DISCHARGE SUMMARY
----------------------------------------------------------------------------------------------------------  [facility]   Psychiatric Discharge Summary      Estevan Aaron MRN# [id number]   Age: 64 year old YOB: 1960     Date of Admission:  10/12/2024  Date of Discharge:  2/24/2025  Admitting Physician:  Linda Esparza MD  Discharge Physician:  Pete Smith MD    This document serves as a transfer of care to Estevan Aaron's outpatient providers.     Events Leading to Hospitalization:     Estevan Aaron is a 64 year old male with previous psychiatric diagnoses of unspecified anxiety vs generalized anxiety disorder admitted from the ER on 10/12/2024 due to concern for HI and psychosis in the context of medical issues (hyperthyroidism, hypercalcemia) and possible psychosocial stressors including family dynamics with recent divorce.     See H&P by Evelia Grimm DO on 10/12/24 for additional details.      Diagnoses:   Primary Psychiatric Diagnosis  Alzheimer's Disease     Secondary Psychiatric Diagnoses  Psychosis      Psychiatric Assessment:   Estevan Aaron is a 64 year old male with previous psychiatric diagnoses of GEORGINA admitted from the ER on 10/12/2024 due to concern for HI and psychosis in the context of medical issues (hyperparathyroidism, hypercalcemia), psychosocial stressors including with recent divorce and selling his home. This is the patient's first psychiatric hospitalization. The MSE on admission was pertinent for a thought process which was perseverative, circumstantial, tangential, disorganized and tangential; with rambling and looseness of associations. Psychological contributions to presentation included lack of insight. Social factors contributing to presentation included isolation, recent divorce, selling his house, and  moving from hotel to hotel. Biological contributions to presentation included a history of hyperparathyroidism with chronic hypercalcemia per charts as well as a history of methamphetamine use per collateral from ex-wife.     Per collateral from ex-wife, Santos has had paranoid ideations since they first met. He has always felt like people are out to get him or trying to rip him off. She says that he has had visual hallucinations (he will point things out that the rest of the family can't see) for a long time, but the family would just go along with him to avoid making him angry. This timeline and presentation could be consistent with diagnosis of paranoid personality disorder. Things began to worsen around the beginning of the COVID pandemic, when Santos became more isolated and the family started to notice cognitive issues such as impaired memory. Other things that could be contributing to his presentation is hyperparathyroidism with hypercalcemia. However, patient's calcium returned to normal limits on 10/16, and patient continues to have disorganized behavior unrelated to calcium elevation, so likely this is not a significant contributing factor to patient's presentation.      Currently, Santos is at times disoriented, but easily re-directable. Presents with some difficulty of word articulation, which contributes to his frustration when interacting with psych team, as he finds it difficult to explain himself. He occasionally has struggled to attend to ADLS particularly bathing and required frequent encouragement, likely due to underlying disorganized behavior. Overall, he is mostly independent in the unit. His confused behavior tends to increase in the evenings, seemingly related to a Neurocognitive Disorder diagnosis, and this could evolve to be his new baseline. He does not present as a danger to himself or others so his SIO was discontinued. Santos does not present with any new episodes of physical agitation and is able  to attend groups and interact with peers without major altercations. Patient currently is stable with current neuroleptic doses in regards to improved paranoia. However, neuroleptics could be worsening his brain fog and may benefit from a down titration as a trial while awaiting placement. He is doing well with a reduction of Zyprexa and the plan is to discontinue it week of 2/10 before expected discharge on 2/24. Due to inability to care for self outside a supportive sxs, would benefit from continued hospital stay with discharge to group home/nursing home when available.        Psychiatric Hospital Course:     Estevan Aaron was admitted to [facility] on a 72 hour hold.   Medications:  PTA fluoxetine was held due to concern for worsening of zelalem   New medications started at the time of admission include Zyprexa.   Increased olanzapine 10 mg at bedtime was to 10 mg BID (10/14)   Increased olanzapine 10 mg BID to 10 mg during day and 15 mg at bedtime (10/15)  10/21: increased olanzapine pm dose from 15 to 20 mg  10/23: started melatonin 3 mg at 7 pm to help patient with circadian rhythm as he has been staying up throughout the night and sleeping a lot during the day and there is some concern for delirium   10/24: consulted anesthesia for MRI brain   10/28: MRI brain complete, decrease AM olanzapine from 10 to 5 mg  10/29: Morning olanzapine decreased to 2.5 mg, evening olanzapine decreased to 15 mg   11/1: Decreased evening olanzapine to 10 mg   11/4: Decreased evening olanzapine to 7.5 mg   11/5: Decreased evening olanzapine to 5 mg   11/11: Moved morning dose of olanzapine to the afternoon as patient appears more agitated in the afternoons/evenings  11/21: Started memantine 5 mg daily   11/26: Discontinued olanzapine 2.5 mg during the afternoon  12/2:   Discontinued memantine 5 mg, daily  2/5: as psychosis has improved with less paranoia, it was thought patient could benefit from a decrease in Zyprexa as it could  be worsening is cognitive sxs and given black box warning for his age category. Decreased Zyprexa to 2.5mg at bedtime as a trial. Can restart if psychosis worsens. Seems to be tolerating this well.   2/10: Discontinued Zyprexa  2/18: Restarted Zyprexa 2.5 mg      The risks, benefits, alternatives, and side effects were discussed and understood by the patient and other caregivers.  Level of medication adherence:  Behaviors: The patient was safe and appropriate and did not require chemical/physical restraints during admission. He was cooperative with cares, had good group attendance, and was visible in the milieu.  Change in psychiatric symptoms: Over the course of this hospitalization the patient's symptoms of agitation and psychosis improved.    Estevan was released to skilled nursing facility. At the time of discharge he was determined to not be a danger to himself or others.     Risk Assessment:      Today Estevan Aaron denies SI/HI/AVH. Patient has notable risk factors for self-harm, including age, single status, and comorbid medical condition of dementia. However, risk is mitigated by sobriety and absence of past attempts. Therefore, based on all available evidence including the factors cited above, he does not appear to be at imminent risk for self-harm, does not meet criteria for a 72-hr hold, and therefore remains appropriate for ongoing outpatient level of care. Additional steps taken to minimize risk include: medication optimization, close psychiatric follow up and provision of crisis resources. Voluntary referral for memory care facility was offered, he accepted this offer.     Psychiatric Examination:     Mental Status Exam:  Oriented to:  Person/Self  General:  Awake  Appearance:  appears stated age  Behavior/Attitude:  Calm and Cooperative  Eye Contact: Appropriate  Psychomotor: Normal no catatonia present  Speech:  appropriate volume/tone  Language: Fluent in English with appropriate syntax and  "vocabulary.  Mood:  \"fine\"  Affect:  appropriate and congruent with mood  Thought Process:  impoverished and looseness of association  Thought Content:   No SI/HI/AH/VH; No apparent delusions  Associations:  questionable  Insight:  limited due to dementia  Judgment:  limited due to dementia  Impulse control: limited  Attention Span:  grossly intact  Concentration:  impaired reverse spelling of WORLD  Recent and Remote Memory:  0/3  Fund of Knowledge: estimated below average  Muscle Strength and Tone: normal  Gait and Station: Normal     Medical Hospital Course:   Medical diagnoses to be addressed this admission:    # Hip Pain  - New right hip pain, and mild pain in multiple joints. Moderate response to topical antiinflammatory gel and lidocaine patch.   - Medicine consulted for recommendations 12/20     # CHF  # Hypertension  - Continue PTA medications  Furosemide 40 mg daily  Lisinopril 40 mg daily  Metoprolol 50 mg daily  Amlodipine 10 mg daily  Clonidine 0.1 mg BID     # Hyperlipidemia  - Continue PTA Atorvastatin 40 mg     # Primary Hyperparathyroidism  # Hypercalcemia, hypophosphoremia   Increased Ca level to 10.9 and decreased Ph level to 2.3 in the ED   - Consulted endocrinology, who started cinacalcet 30 mg BID on 10/13  - 10/16 endocrinology recommended continuing to trend calcium and albumin to make sure patient does not become hypocalcemic and recommended holding cinacalcet   - 10/17, calcium and albumin wnl, endo recommended cinacalcet 30 mg once daily   - 10/21, per endo continue cincaclcet and recheck calcium and albumin on October 24  - 10/23: per endo, patient needs to follow up outpatient for further management of hyperparathyroidism      # Rhinorrhea  1/15 currently multiple COVID infections on unit. No other symptoms of COVID noted. COVID PCR - on 1/13.     #Chipped tooth: Pt chipped a molar at dinner on 2/4, denied pain. No bleeding.  - would benefit from outpt dental follow up.      Medical " course: Patient was physically examined by the ED prior to being transferred to the unit and was found to be medically stable and appropriate for admission.      Consults: Psychiatry, Endocrinology (follow-up of hyperparathyroidism / hypercalcemia and hypertension), neurology     Discharge Medications:     Discharge Medication List as of 2/24/2025  8:47 AM        START taking these medications    Details   cholecalciferol (VITAMIN D3) 1250 mcg (04283 units) capsule Take 1 capsule (1,250 mcg) by mouth every 7 days., Disp-4 capsule, R-0, E-Prescribe      cinacalcet (SENSIPAR) 30 MG tablet Take 1 tablet (30 mg) by mouth daily., Disp-30 tablet, R-0, E-Prescribe      melatonin 3 MG tablet Take 1 tablet (3 mg) by mouth at bedtime., Disp-30 tablet, R-0, E-Prescribe      OLANZapine (ZYPREXA) 2.5 MG tablet Take 1 tablet (2.5 mg) by mouth at bedtime., Disp-30 tablet, R-0, E-Prescribe      senna-docusate (SENOKOT-S/PERICOLACE) 8.6-50 MG tablet Take 1 tablet by mouth daily., Disp-30 tablet, R-0, E-Prescribe           CONTINUE these medications which have CHANGED    Details   !! amLODIPine (NORVASC) 10 MG tablet Take 1 tablet (10 mg) by mouth daily., Disp-30 tablet, R-0, E-Prescribe      !! cloNIDine (CATAPRES) 0.1 MG tablet Take 1 tablet (0.1 mg) by mouth 2 times daily., Disp-60 tablet, R-0, E-Prescribe      furosemide (LASIX) 40 MG tablet Take 1 tablet (40 mg) by mouth daily., Disp-30 tablet, R-0, E-Prescribe      lisinopril (ZESTRIL) 40 MG tablet Take 1 tablet (40 mg) by mouth daily., Disp-30 tablet, R-0, E-Prescribe      metoprolol succinate ER (TOPROL XL) 50 MG 24 hr tablet Take 1 tablet (50 mg) by mouth daily., Disp-30 tablet, R-0, E-Prescribe       !! - Potential duplicate medications found. Please discuss with provider.        CONTINUE these medications which have NOT CHANGED    Details   !! amLODIPine (NORVASC) 10 MG tablet Take 1 tablet (10 mg) by mouth daily, Disp-90 tablet, R-4, E-Prescribe      !! cloNIDine  (CATAPRES) 0.1 MG tablet Take 1 tablet (0.1 mg) by mouth 2 times daily, Disp-180 tablet, R-4, E-Prescribe      atorvastatin (LIPITOR) 40 MG tablet Take 1 tablet (40 mg) by mouth daily, Disp-90 tablet, R-4, E-Prescribe       !! - Potential duplicate medications found. Please discuss with provider.        STOP taking these medications       FLUoxetine (PROZAC) 40 MG capsule Comments:   Reason for Stopping:                Discharge Plan:   Medications as above  Health Care Follow-up:   No appointments have been scheduled as your care will be managed by [facility].     Attestations:     Sherrie Cosby MD  PGY1 Psychiatry resident    Attestation:  This patient has been seen and evaluated by me, Pete Smith MD.  I have discussed this patient with the house staff team including the resident and/or medical student and I agree with the findings and plan in this note.    I have reviewed today's vital signs, medications, labs and imaging. Pete Smith MD , PhD.

## 2025-02-21 NOTE — PROGRESS NOTES
----------------------------------------------------------------------------------------------------------  [facility]  Psychiatry Progress Note  Hospital Day #132     Interim History:     The patient's care was discussed with the treatment team and chart notes were reviewed.    Identifier: Estevan Aaron is a 64 year old male with previous psychiatric diagnoses of  generalized anxiety disorder, Neurocognitive disorder, admitted from the ED 10/12/2024 due to concern for HI and psychosis, which now have resolved, in the psychosocial stressors (recent divorce).     Sleep: 4.75 hours (02/21/25 0600)  Scheduled medications: Took all scheduled medications as prescribed  Psychiatric PRN medications:  none       Staff Report:   Pt appears to have slept for 4.75  hours.  He woke up intermittently and sat/slept in the lounge area. He refused redirection to sleep continuously on the bed in his room. Pt denies pain, anxiety, depression, and SI/SIB. No PRN medications were given. Pt is on SI, 1:1 for severe intrusiveness. 15 minutes safety checks were in place. Staff will continue to offer support to pt. .      Subjective:     Patient Interview:  Santos was seen pacing in the milieu. Reports a bad evening yesterday as it was flooded out. Denies dyspnea or chest pain or any other physical symptoms.     ROS:  Patient denies acute concerns apart from foot pain and numbness noted above.     Objective:     Vitals:  /78 (BP Location: Left arm, Patient Position: Sitting, Cuff Size: Adult Large)   Pulse 62   Temp 97.4  F (36.3  C) (Temporal)   Resp 18   Wt 114 kg (251 lb 6.4 oz)   SpO2 94%   BMI 40.58 kg/m      Allergies:  No Known Allergies    Current Medications:  Scheduled:  Current Facility-Administered Medications   Medication Dose Route Frequency Provider Last Rate Last Admin    acetaminophen (TYLENOL) tablet 650 mg  650 mg Oral Q4H PRN Nikki Caceres MD   650 mg at  02/17/25 0757    alum & mag hydroxide-simethicone (MAALOX) suspension 30 mL  30 mL Oral Q4H PRN Nikki Caceres MD   30 mL at 10/17/24 0837    amLODIPine (NORVASC) tablet 10 mg  10 mg Oral Daily Presley Barrera MD   10 mg at 02/20/25 0841    atorvastatin (LIPITOR) tablet 40 mg  40 mg Oral Daily Nikki Caceres MD   40 mg at 02/20/25 0840    cholecalciferol (VITAMIN D3) capsule 1,250 mcg  1,250 mcg Oral Q7 Days Evelia Grimm DO   1,250 mcg at 02/18/25 1010    cinacalcet (SENSIPAR) tablet 30 mg  30 mg Oral Daily Presley Barrera MD   30 mg at 02/20/25 0841    cloNIDine (CATAPRES) tablet 0.1 mg  0.1 mg Oral BID Presley Barrera MD   0.1 mg at 02/20/25 2016    diclofenac (VOLTAREN) 1 % topical gel 2 g  2 g Topical 4x Daily PRN Rocío Lopez MD   2 g at 12/22/24 2032    furosemide (LASIX) tablet 40 mg  40 mg Oral Daily Presley Barrera MD   40 mg at 02/20/25 0841    gabapentin (NEURONTIN) capsule 100 mg  100 mg Oral Q6H PRN Nikki Caceres MD   100 mg at 12/24/24 0046    hydrocortisone (CORTAID) 0.5 % cream   Topical Daily PRN Savi Chamorro MD        Lidocaine (LIDOCARE) 4 % Patch 1 patch  1 patch Transdermal Q24H PRN Rocío Lopez MD   1 patch at 12/22/24 2023    lisinopril (ZESTRIL) tablet 40 mg  40 mg Oral Daily Presley Barrera MD   40 mg at 02/20/25 0841    melatonin tablet 3 mg  3 mg Oral At Bedtime Presley Barrera MD   3 mg at 02/20/25 1804    metoprolol succinate ER (TOPROL XL) 24 hr tablet 50 mg  50 mg Oral Daily Presley Barrera MD   50 mg at 02/20/25 0841    nicotine (NICODERM CQ) 14 MG/24HR 24 hr patch 1 patch  1 patch Transdermal Daily Sirisha, Evelia, DO   1 patch at 02/20/25 0846    nicotine (NICORETTE) gum 2 mg  2 mg Buccal Q1H PRN González Garcia MD   2 mg at 10/24/24 1254    OLANZapine (zyPREXA) tablet 2.5 mg  2.5 mg Oral At Bedtime Sherrie Cosby MD   2.5 mg at 02/20/25 1804    Or    OLANZapine (zyPREXA) injection 5 mg  5 mg Intramuscular At Bedtime  Sherrie Cosby MD        OLANZapine (zyPREXA) tablet 5 mg  5 mg Oral TID PRN Evelia Grimm DO   5 mg at 02/19/25 1023    Or    OLANZapine (zyPREXA) injection 5 mg  5 mg Intramuscular TID PRN Evelia Grimm DO        polyethylene glycol (MIRALAX) Packet 17 g  17 g Oral Daily PRN Nikki Caceres MD        senna-docusate (SENOKOT-S/PERICOLACE) 8.6-50 MG per tablet 1 tablet  1 tablet Oral Daily Evelia Grimm DO   1 tablet at 02/20/25 0840    sodium chloride (DWAIN 128) 2 % ophthalmic solution 1 drop  1 drop Both Eyes Q3H PRN Kat Aguilar MD   1 drop at 02/15/25 1205    traZODone (DESYREL) half-tab 25 mg  25 mg Oral At Bedtime Rocío Lopez MD   25 mg at 02/20/25 2016    traZODone (DESYREL) half-tab 25 mg  25 mg Oral At Bedtime PRN Evelia Grimm DO   25 mg at 12/24/24 0046       PRN:  Current Facility-Administered Medications   Medication Dose Route Frequency Provider Last Rate Last Admin    acetaminophen (TYLENOL) tablet 650 mg  650 mg Oral Q4H PRN Nikki Caceres MD   650 mg at 02/17/25 0757    alum & mag hydroxide-simethicone (MAALOX) suspension 30 mL  30 mL Oral Q4H PRN Nikki Caceres MD   30 mL at 10/17/24 0837    amLODIPine (NORVASC) tablet 10 mg  10 mg Oral Daily Presley Barrera MD   10 mg at 02/20/25 0841    atorvastatin (LIPITOR) tablet 40 mg  40 mg Oral Daily Nikki Caceres MD   40 mg at 02/20/25 0840    cholecalciferol (VITAMIN D3) capsule 1,250 mcg  1,250 mcg Oral Q7 Days Evelia Grimm DO   1,250 mcg at 02/18/25 1010    cinacalcet (SENSIPAR) tablet 30 mg  30 mg Oral Daily Presley Barrera MD   30 mg at 02/20/25 0841    cloNIDine (CATAPRES) tablet 0.1 mg  0.1 mg Oral BID Presley Barrera MD   0.1 mg at 02/20/25 2016    diclofenac (VOLTAREN) 1 % topical gel 2 g  2 g Topical 4x Daily PRN Rocío Lopez MD   2 g at 12/22/24 2032    furosemide (LASIX) tablet 40 mg  40 mg Oral Daily Presley Barrera MD   40 mg at 02/20/25 0841    gabapentin (NEURONTIN) capsule  100 mg  100 mg Oral Q6H PRN Nikki Caceres MD   100 mg at 12/24/24 0046    hydrocortisone (CORTAID) 0.5 % cream   Topical Daily PRN Savi Chamorro MD        Lidocaine (LIDOCARE) 4 % Patch 1 patch  1 patch Transdermal Q24H PRN Rocío Lopez MD   1 patch at 12/22/24 2023    lisinopril (ZESTRIL) tablet 40 mg  40 mg Oral Daily Presley Barrera MD   40 mg at 02/20/25 0841    melatonin tablet 3 mg  3 mg Oral At Bedtime Presley Barrera MD   3 mg at 02/20/25 1804    metoprolol succinate ER (TOPROL XL) 24 hr tablet 50 mg  50 mg Oral Daily Presley Barrera MD   50 mg at 02/20/25 0841    nicotine (NICODERM CQ) 14 MG/24HR 24 hr patch 1 patch  1 patch Transdermal Daily Evelia Grimm DO   1 patch at 02/20/25 0846    nicotine (NICORETTE) gum 2 mg  2 mg Buccal Q1H PRN González Garcia MD   2 mg at 10/24/24 1254    OLANZapine (zyPREXA) tablet 2.5 mg  2.5 mg Oral At Bedtime Sherrie Cosby MD   2.5 mg at 02/20/25 1804    Or    OLANZapine (zyPREXA) injection 5 mg  5 mg Intramuscular At Bedtime Sherrie Cosby MD        OLANZapine (zyPREXA) tablet 5 mg  5 mg Oral TID PRN Evelia Grimm DO   5 mg at 02/19/25 1023    Or    OLANZapine (zyPREXA) injection 5 mg  5 mg Intramuscular TID PRN Evelia Grimm DO        polyethylene glycol (MIRALAX) Packet 17 g  17 g Oral Daily PRN Nikki Caceres MD        senna-docusate (SENOKOT-S/PERICOLACE) 8.6-50 MG per tablet 1 tablet  1 tablet Oral Daily Evelia Grimm DO   1 tablet at 02/20/25 0840    sodium chloride (DWAIN 128) 2 % ophthalmic solution 1 drop  1 drop Both Eyes Q3H PRN Kat Aguilar MD   1 drop at 02/15/25 1205    traZODone (DESYREL) half-tab 25 mg  25 mg Oral At Bedtime Rocío Lopez MD   25 mg at 02/20/25 2016    traZODone (DESYREL) half-tab 25 mg  25 mg Oral At Bedtime PRN Evelia Grimm DO   25 mg at 12/24/24 0046     Labs and Imaging:  Data this admission:  - CBC unremarkable  - CMP unremarkable  - TSH normal  - UDS negative  - Vit D  "low  - Hgb A1c 5.9 (10/13/24)  - Lipids unremarkable  - Vit B12 normal  - Folate normal  - Urinalysis unremarkable  - EKG normal sinus rhythm, QTc 390 ms  - Head CT showed no acute changes  - HIV non reactive  - Treponema antibody non reactive  - ESR wnl   - Ceruloplasmin wnl   - BOOGIE negative  - Lyme negative      Parathyroid hormone:   85 (10/13)     Calcium:  11.4 (10/14) -> 10.3 (10/16) -> 9.8 (10/19) -> 10.6 (10/21) -> 10.3 (10/23)     Albumin:  4.3 (10/14) -->  4.3 (10/16) -> 4.1 (10/17) -> 4.2 (10/19) -> 4.5 (10/21) -> 4.3 (10/23)      Mental Status Exam:     Oriented to:  Person/Self  General:  Alert, eating in the lounge  Appearance:  Appears stated age, dressed in own clothing, overweight, grooming adequate with reported shower today  Behavior/Attitude: Cooperative, irritable   Eye Contact: Appropriate  Psychomotor: Normal and No evidence of tics, dystonia, or tardive dyskinesia  no catatonia present  Speech:  loud volume/tone  Language: Fluent in English with appropriate syntax and vocabulary.  Mood:  \"Fine\"  Affect:   appropriate and congruent with mood  Thought Process:   concrete, disorganized, confused  Thought Content:   No SI/HI/AH/VH;  No apparent delusions today  Associations:  loose  Insight:  impaired due to neurocognitive disorder   Judgment:  impaired due to neurocognitive disorder  Impulse control: impaired  Attention Span:  inattentive, limited  Concentration:   impaired by neurocognitive disorder  Recent and Remote Memory:   remote memory intact, recent memory impaired  Fund of Knowledge: average  Muscle Strength and Tone: normal  Gait and Station: Normal      Psychiatric Assessment     Estevan Aaron is a 64 year old male with previous psychiatric diagnoses of GEORGINA admitted from the ER on 10/12/2024 due to concern for HI and psychosis in the context of medical issues (hyperparathyroidism, hypercalcemia), psychosocial stressors including with recent divorce and selling his home. This is the " patient's first psychiatric hospitalization. The MSE on admission was pertinent for a thought process which was perseverative, circumstantial, tangential, disorganized and tangential; with rambling and looseness of associations. Psychological contributions to presentation included lack of insight. Social factors contributing to presentation included isolation, recent divorce, selling his house, and moving from hotel to hotel. Biological contributions to presentation included a history of hyperparathyroidism with chronic hypercalcemia per charts as well as a history of methamphetamine use per collateral from ex-wife.     Per collateral from ex-wife, Santos has had paranoid ideations since they first met. He has always felt like people are out to get him or trying to rip him off. She says that he has had visual hallucinations (he will point things out that the rest of the family can't see) for a long time, but the family would just go along with him to avoid making him angry. This timeline and presentation could be consistent with diagnosis of paranoid personality disorder. Things began to worsen around the beginning of the COVID pandemic, when Santos became more isolated and the family started to notice cognitive issues such as impaired memory. Other things that could be contributing to his presentation is hyperparathyroidism with hypercalcemia. However, patient's calcium returned to normal limits on 10/16, and patient continues to have disorganized behavior unrelated to calcium elevation, so likely this is not a significant contributing factor to patient's presentation.      Currently, Santos is at times disoriented, but easily re-directable. Presents with some difficulty of word articulation, which contributes to his frustration when interacting with psych team, as he finds it difficult to explain himself. He occasionally has struggled to attend to ADLS particularly bathing and required frequent encouragement, likely due to  underlying disorganized behavior. Overall, he is mostly independent in the unit. His confused behavior tends to increase in the evenings, seemingly related to a Neurocognitive Disorder diagnosis, and this could evolve to be his new baseline. He does not present as a danger to himself or others so his SIO was discontinued. Santos does not present with any new episodes of physical agitation and is able to attend groups and interact with peers without major altercations. Patient currently is stable with current neuroleptic doses in regards to improved paranoia. However, neuroleptics could be worsening his brain fog and may benefit from a down titration as a trial while awaiting placement. He is doing well with a reduction of Zyprexa and the plan is to discontinue it week of 2/10 before expected discharge on 2/24. Due to inability to care for self outside a supportive sxs, would benefit from continued hospital stay with discharge to group home/nursing home when available.          Psychiatric Plan by Diagnosis     Today's changes:  - No changes    # Major Neurocognitive Disorder  1. Medications:  - Trazodone 25 mg HS  - Zyprexa 2.5 mg  - PRN Zyprexa 5 mg TID     3. Additional Plans:  - Patient will be treated in therapeutic milieu with appropriate individual and group therapies as described  - Pending memory facility placement.      # Unspecified anxiety vs Generalized Anxiety Disorder  - Monitor for symptoms.  - Fluoxetine held due to suspicion of ongoing manic symptoms     Psychiatric Hospital Course:      Estevan Aaron was admitted to [facility] on a 72 hour hold.   Medications:  PTA fluoxetine was held due to concern for worsening of zelalem   New medications started at the time of admission include Zyprexa.   Increased olanzapine 10 mg at bedtime was to 10 mg BID (10/14)   Increased olanzapine 10 mg BID to 10 mg during day and 15 mg at bedtime (10/15)  10/21: increased olanzapine pm dose from 15 to 20 mg  10/23: started  melatonin 3 mg at 7 pm to help patient with circadian rhythm as he has been staying up throughout the night and sleeping a lot during the day and there is some concern for delirium   10/24: consulted anesthesia for MRI brain   10/28: MRI brain complete, decrease AM olanzapine from 10 to 5 mg  10/29: Morning olanzapine decreased to 2.5 mg, evening olanzapine decreased to 15 mg   11/1: Decreased evening olanzapine to 10 mg   11/4: Decreased evening olanzapine to 7.5 mg   11/5: Decreased evening olanzapine to 5 mg   11/11: Moved morning dose of olanzapine to the afternoon as patient appears more agitated in the afternoons/evenings  11/21: Started memantine 5 mg daily   11/26: Discontinued olanzapine 2.5 mg during the afternoon  12/2:   Discontinued memantine 5 mg, daily  2/5: as psychosis has improved with less paranoia, it was thought patient could benefit from a decrease in Zyprexa as it could be worsening is cognitive sxs and given black box warning for his age category. Decreased Zyprexa to 2.5mg at bedtime as a trial. Can restart if psychosis worsens. Seems to be tolerating this well.   2/10: Discontinued Zyprexa  2/18: Restarted Zyprexa 2.5 mg     The risks, benefits, alternatives, and side effects were discussed and understood by the patient and other caregivers.     Medical Assessment and Plan     Medical diagnoses to be addressed this admission:    # Hip Pain  - New right hip pain, and mild pain in multiple joints. Moderate response to topical antiinflammatory gel and lidocaine patch.   - Medicine consulted for recommendations 12/20    # CHF  # Hypertension  - Continue PTA medications  Furosemide 40 mg daily  Lisinopril 40 mg daily  Metoprolol 50 mg daily  Amlodipine 10 mg daily  Clonidine 0.1 mg BID    # Hyperlipidemia  - Continue PTA Atorvastatin 40 mg     # Primary Hyperparathyroidism  # Hypercalcemia, hypophosphoremia   Increased Ca level to 10.9 and decreased Ph level to 2.3 in the ED   - Consulted  endocrinology, who started cinacalcet 30 mg BID on 10/13  - 10/16 endocrinology recommended continuing to trend calcium and albumin to make sure patient does not become hypocalcemic and recommended holding cinacalcet   - 10/17, calcium and albumin wnl, endo recommended cinacalcet 30 mg once daily   - 10/21, per endo continue cincaclcet and recheck calcium and albumin on October 24  - 10/23: per endo, patient needs to follow up outpatient for further management of hyperparathyroidism     # Rhinorrhea  1/15 currently multiple COVID infections on unit. No other symptoms of COVID noted. COVID PCR - on 1/13.    #Chipped tooth: Pt chipped a molar at dinner on 2/4, denied pain. No bleeding.  - would benefit from outpt dental follow up.     Medical course: Patient was physically examined by the ED prior to being transferred to the unit and was found to be medically stable and appropriate for admission.      Consults: Psychiatry, Endocrinology (follow-up of hyperparathyroidism / hypercalcemia and hypertension), neurology     Checklist     Legal Status: Committed   [place] meds: Haldol, Clozaril, Risperdal, Invega, Zyprexa, Seroquel, Abilify    Safety Assessment:   Behavioral Orders   Procedures    Code 1 - Restrict to Unit    Routine Programming     As clinically indicated    Status 15     Every 15 minutes.    Status Individual Observation     Patient SIO status reviewed with team/RN.  Please also refer to RN/team documentation for add'l detail.    -SIO staff to monitor following which have contributed to patient being on SIO:  Patient intrusiveness to other patients  -Possible interventions SIO staff could use to support patient's treatment progress:  Redirection  -When following observed, team will review discontinuation of SIO:  Improvement in intrusive behavior.     Order Specific Question:   CONTINUOUS 24 hours / day     Answer:   Other     Order Specific Question:   Specify distance     Answer:   10 feet     Order  Specific Question:   Indications for SIO     Answer:   Severe intrusiveness    Status Individual Observation     Patient SIO status reviewed with team/RN.  Please also refer to RN/team documentation for add'l detail.    -SIO staff to monitor following which have contributed to patient being on SIO:  Being intrusive, vulnurable   -Possible interventions SIO staff could use to support patient's treatment progress:  Redirection, avoiding conflict with other pts  -When following observed, team will review discontinuation of SIO:  Pt is calm     Order Specific Question:   CONTINUOUS 24 hours / day     Answer:   Other     Order Specific Question:   Specify distance     Answer:   10 feet     Order Specific Question:   Indications for SIO     Answer:   Severe intrusiveness       Risk Assessment:  Risk for harm is low  Risk factors: impulsive and past behaviors  Protective factors: family      SIO: Yes, foe severe intrusiveness and being vulnurable    Disposition: Pending transfer to memory care facility.  Accepted to [facility] for 2/24.      Attestations   Sherrie Cosby MD  PGY1 Psychiatry Resident    Attestation:  This patient has been seen and evaluated by me, Pete Smith MD.  I have discussed this patient with the house staff team including the resident and/or medical student and I agree with the findings and plan in this note.    I have reviewed today's vital signs, medications, labs and imaging. Pete Smith MD , PhD.

## 2025-02-22 PROCEDURE — 250N000013 HC RX MED GY IP 250 OP 250 PS 637

## 2025-02-22 PROCEDURE — 124N000002 HC R&B MH UMMC

## 2025-02-22 RX ADMIN — ATORVASTATIN CALCIUM 40 MG: 20 TABLET, FILM COATED ORAL at 08:25

## 2025-02-22 RX ADMIN — AMLODIPINE BESYLATE 10 MG: 5 TABLET ORAL at 08:24

## 2025-02-22 RX ADMIN — METOPROLOL SUCCINATE 50 MG: 50 TABLET, EXTENDED RELEASE ORAL at 08:24

## 2025-02-22 RX ADMIN — Medication 1 PATCH: at 08:25

## 2025-02-22 RX ADMIN — Medication 25 MG: at 20:24

## 2025-02-22 RX ADMIN — CLONIDINE HYDROCHLORIDE 0.1 MG: 0.1 TABLET ORAL at 08:24

## 2025-02-22 RX ADMIN — FUROSEMIDE 40 MG: 40 TABLET ORAL at 08:25

## 2025-02-22 RX ADMIN — OLANZAPINE 2.5 MG: 2.5 TABLET, FILM COATED ORAL at 20:24

## 2025-02-22 RX ADMIN — SENNOSIDES AND DOCUSATE SODIUM 1 TABLET: 50; 8.6 TABLET ORAL at 08:24

## 2025-02-22 RX ADMIN — LISINOPRIL 40 MG: 10 TABLET ORAL at 08:24

## 2025-02-22 RX ADMIN — CLONIDINE HYDROCHLORIDE 0.1 MG: 0.1 TABLET ORAL at 20:24

## 2025-02-22 RX ADMIN — CINACALCET 30 MG: 30 TABLET ORAL at 08:25

## 2025-02-22 RX ADMIN — MELATONIN TAB 3 MG 3 MG: 3 TAB at 20:24

## 2025-02-22 ASSESSMENT — ACTIVITIES OF DAILY LIVING (ADL)
ADLS_ACUITY_SCORE: 66
LAUNDRY: UNABLE TO COMPLETE
ADLS_ACUITY_SCORE: 66
DRESS: STREET CLOTHES;INDEPENDENT
ADLS_ACUITY_SCORE: 66
HYGIENE/GROOMING: HANDWASHING;INDEPENDENT;SHOWER
ORAL_HYGIENE: INDEPENDENT;PROMPTS
ADLS_ACUITY_SCORE: 66

## 2025-02-22 NOTE — PLAN OF CARE
"Goal Outcome Evaluation:    Plan of Care Reviewed With: patient Plan of Care Reviewed With: patient    Overall Patient Progress: improvingOverall Patient Progress: improving       Problem: Adult Behavioral Health Plan of Care  Goal: Plan of Care Review  Outcome: Progressing  Flowsheets  Taken 2/22/2025 1319  Plan of Care Reviewed With: patient  Overall Patient Progress: improving  Patient Agreement with Plan of Care: agrees  Taken 2/22/2025 1200  Patient Agreement with Plan of Care: agrees     Pt was up and visible in the milieu majority of this shift. Pt remains disorganized and labile and irritable mood. Affect is restricted and requires frequent redirections. Pt is able redirectable with SIO 1:1 help. Pt was observed sitting on the lounge watching TV and interact with select peers. Pt denied pain and all mental health symptoms. Pt has poor insight to his mental illness. Does not cooperate well with assessment. Pt took all scheduled medicines without prompts. Hygiene appears unkempt but pt refused to shower, stated \"I will take one tomorrow, do I smell?\" Pt eats and drinking adequately.         Discharge plans:   Pt will be discharged on Monday. Scheduled ambulance transport for 10:30am  on Monday 2/24 to  Memory care facility.  "

## 2025-02-22 NOTE — PLAN OF CARE
Problem: Sleep Disturbance  Goal: Adequate Sleep/Rest  Outcome: Progressing    Pt appears to have slept for 5.5 hours.  He woke up intermittently and sat/slept in the lounge area. He refused redirection to sleep continuously on the bed in his room. Pt denies pain, anxiety, depression, and SI/SIB. No PRN medications were given. Pt is on SI, 1:1 for severe intrusiveness. 15 minutes safety checks were in place. Staff will continue to offer support to pt.

## 2025-02-22 NOTE — PLAN OF CARE
The pt was observed in the milieu for most of the shift, watching TV and occasionally napping in the lounge. 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 The pt made an intermittent confusional statements but was easily distracted and redirectable. The pt was dismissive and irritable during  assessments stating he was fine and contracted for safety. There were no behavioral escalations or safety concerns noted or reported. The pt remained on SIO 1:1 due to severe intrusiveness. The pt's appetite was good and maintained adequate fluid intake. The pt complied with med and care.     Problem: Adult Behavioral Health Plan of Care  Goal: Plan of Care Review  Outcome: Progressing  Flowsheets (Taken 2/21/2025 1630)  Plan of Care Reviewed With: patient  Overall Patient Progress: improving  Patient Agreement with Plan of Care: refuses to participate   Goal Outcome Evaluation:    Plan of Care Reviewed With: patient Plan of Care Reviewed With: patient    Overall Patient Progress: improvingOverall Patient Progress: improving

## 2025-02-23 PROCEDURE — 250N000013 HC RX MED GY IP 250 OP 250 PS 637

## 2025-02-23 PROCEDURE — 124N000002 HC R&B MH UMMC

## 2025-02-23 RX ADMIN — METOPROLOL SUCCINATE 50 MG: 50 TABLET, EXTENDED RELEASE ORAL at 08:29

## 2025-02-23 RX ADMIN — Medication 1 PATCH: at 08:28

## 2025-02-23 RX ADMIN — OLANZAPINE 2.5 MG: 2.5 TABLET, FILM COATED ORAL at 20:24

## 2025-02-23 RX ADMIN — CINACALCET 30 MG: 30 TABLET ORAL at 08:29

## 2025-02-23 RX ADMIN — Medication 25 MG: at 20:24

## 2025-02-23 RX ADMIN — AMLODIPINE BESYLATE 10 MG: 5 TABLET ORAL at 08:28

## 2025-02-23 RX ADMIN — MELATONIN TAB 3 MG 3 MG: 3 TAB at 20:25

## 2025-02-23 RX ADMIN — FUROSEMIDE 40 MG: 40 TABLET ORAL at 08:29

## 2025-02-23 RX ADMIN — LISINOPRIL 40 MG: 10 TABLET ORAL at 08:28

## 2025-02-23 RX ADMIN — SENNOSIDES AND DOCUSATE SODIUM 1 TABLET: 50; 8.6 TABLET ORAL at 08:29

## 2025-02-23 RX ADMIN — CLONIDINE HYDROCHLORIDE 0.1 MG: 0.1 TABLET ORAL at 08:29

## 2025-02-23 RX ADMIN — CLONIDINE HYDROCHLORIDE 0.1 MG: 0.1 TABLET ORAL at 20:24

## 2025-02-23 RX ADMIN — ATORVASTATIN CALCIUM 40 MG: 20 TABLET, FILM COATED ORAL at 08:28

## 2025-02-23 ASSESSMENT — ACTIVITIES OF DAILY LIVING (ADL)
ADLS_ACUITY_SCORE: 66
HYGIENE/GROOMING: INDEPENDENT
ADLS_ACUITY_SCORE: 66
ORAL_HYGIENE: INDEPENDENT
ADLS_ACUITY_SCORE: 66
DRESS: INDEPENDENT
ADLS_ACUITY_SCORE: 66

## 2025-02-23 NOTE — PLAN OF CARE
Goal Outcome Evaluation:    Plan of Care Reviewed With: patient Plan of Care Reviewed With: patient    Overall Patient Progress: improvingOverall Patient Progress: improving       Problem: Adult Behavioral Health Plan of Care  Goal: Plan of Care Review  Outcome: Progressing  Flowsheets  Taken 2/23/2025 1327  Plan of Care Reviewed With: patient  Overall Patient Progress: improving  Patient Agreement with Plan of Care: agrees  Taken 2/23/2025 1100  Patient Agreement with Plan of Care: agrees     Pt was up and visible in the milieu majority of this shift. Pt remains disorganized. Pt is labile.  Affect is restricted and requires frequent redirections. Pt is able redirectable with SIO 1:1 help. Pt was observed sitting on the lounge watching TV and interact with select peers. Pt denied pain and all mental health symptoms. Pt has poor insight to his mental illness. Does not cooperate well with assessment. Pt took all scheduled medicines without prompts. Hygiene appears unkempt but pt refused to shower, but he brushed his teeth. Pt eats and drinking adequately.         Discharge plans:   Pt will be discharged on Monday. Scheduled ambulance transport for 10:30am  on Monday 2/24 to  Memory care facility.

## 2025-02-23 NOTE — PLAN OF CARE
Patient was out visible socially appropriate and minimally engaging, he was withdrawn to self however able to communicate needs, required prompts with medication management, he denied pain and discomfort, affect was flat and blunted, watched tv with peers, hygiene and intake was good, no behavioral concerns during this assessment.   Problem: Psychotic Signs/Symptoms  Goal: Improved Mood Symptoms (Psychotic Signs/Symptoms)  Outcome: Progressing  Flowsheets (Taken 2/22/2025 2236)  Mutually Determined Action Steps (Improved Mood Symptoms): acknowledges progress     Problem: Anxiety  Goal: Anxiety Reduction or Resolution  Outcome: Progressing  Intervention: Promote Anxiety Reduction  Recent Flowsheet Documentation  Taken 2/22/2025 2200 by Alice Sethi, RN  Family/Support System Care:   involvement promoted   presence promoted   self-care encouraged   support provided   Goal Outcome Evaluation:    Plan of Care Reviewed With: patient

## 2025-02-23 NOTE — PLAN OF CARE
Problem: Sleep Disturbance  Goal: Adequate Sleep/Rest  Outcome: Progressing   Goal Outcome Evaluation:     Pt has been sleeping in his room most of the shift. He denies pain and did not received any PRN this shift. No new concern at this time.    Blood pressure 133/72, pulse 65, temperature 98.1  F (36.7  C), temperature source Temporal, resp. rate 16, weight 114 kg (251 lb 6.4 oz), SpO2 95%.

## 2025-02-24 ENCOUNTER — MEDICAL CORRESPONDENCE (OUTPATIENT)
Dept: HEALTH INFORMATION MANAGEMENT | Facility: CLINIC | Age: 65
End: 2025-02-24

## 2025-02-24 VITALS
HEART RATE: 64 BPM | RESPIRATION RATE: 16 BRPM | OXYGEN SATURATION: 96 % | SYSTOLIC BLOOD PRESSURE: 134 MMHG | DIASTOLIC BLOOD PRESSURE: 76 MMHG | TEMPERATURE: 98.4 F | BODY MASS INDEX: 40.9 KG/M2 | WEIGHT: 253.4 LBS

## 2025-02-24 PROCEDURE — 250N000013 HC RX MED GY IP 250 OP 250 PS 637

## 2025-02-24 PROCEDURE — 99238 HOSP IP/OBS DSCHRG MGMT 30/<: CPT | Mod: GC | Performed by: PSYCHIATRY & NEUROLOGY

## 2025-02-24 RX ORDER — ATORVASTATIN CALCIUM 40 MG/1
40 TABLET, FILM COATED ORAL DAILY
Qty: 30 TABLET | Refills: 0 | Status: SHIPPED | OUTPATIENT
Start: 2025-02-24 | End: 2025-03-26

## 2025-02-24 RX ADMIN — CLONIDINE HYDROCHLORIDE 0.1 MG: 0.1 TABLET ORAL at 08:30

## 2025-02-24 RX ADMIN — FUROSEMIDE 40 MG: 40 TABLET ORAL at 08:29

## 2025-02-24 RX ADMIN — LISINOPRIL 40 MG: 10 TABLET ORAL at 08:30

## 2025-02-24 RX ADMIN — SENNOSIDES AND DOCUSATE SODIUM 1 TABLET: 50; 8.6 TABLET ORAL at 08:29

## 2025-02-24 RX ADMIN — Medication 1 PATCH: at 08:30

## 2025-02-24 RX ADMIN — METOPROLOL SUCCINATE 50 MG: 50 TABLET, EXTENDED RELEASE ORAL at 08:30

## 2025-02-24 RX ADMIN — Medication 25 MG: at 01:59

## 2025-02-24 RX ADMIN — ATORVASTATIN CALCIUM 40 MG: 20 TABLET, FILM COATED ORAL at 08:29

## 2025-02-24 RX ADMIN — CINACALCET 30 MG: 30 TABLET ORAL at 08:29

## 2025-02-24 RX ADMIN — AMLODIPINE BESYLATE 10 MG: 5 TABLET ORAL at 08:29

## 2025-02-24 ASSESSMENT — ACTIVITIES OF DAILY LIVING (ADL)
ADLS_ACUITY_SCORE: 66

## 2025-02-24 NOTE — PLAN OF CARE
Problem: Sleep Disturbance  Goal: Adequate Sleep/Rest  Outcome: Progressing  Pt appears to have slept for 5.25 hours. PRN Trazodone was given to promote sleep, which was effective.  He woke up intermittently and sat/slept in the lounge area. He refused redirection to sleep continuously in his room. Pt continue to be  worried about his discharge today. Pt endorse anxiety, but refused medication intervention. Pt denies pain, depression, and SI/SIB. Pt is on SIO, 1:1 for severe intrusiveness. 15 minutes safety checks were in place. Staff will continue to offer support to pt.

## 2025-02-24 NOTE — PLAN OF CARE
Team Note Due:  Monday    Assessment/Intervention/Current Symtoms and Care Coordination:  Chart review and met with team, discussed pt progress, symptomology, and response to treatment.  Discussed the discharge plan and any potential impediments to discharge. Clifford Aaron is emergency guardian/conservator until 02/27/2025. A petition for permanent guardianship/conservatorship has been filed and a hearing is scheduled for 2/27/25.    PD and COS faxed to [facility].    Discharge summary and PD sent to WAYEN Ryan.    Updated family and Ofelia NAVAS when pt was transported off the unit.    Discharge Plan or Goal:  [facility] - Memory Care unit     Barriers to Discharge:  N/A. Pt discharged today.     Referral Status:  Memory Care approved:  [facility]. Tour completed 11/30. Per Clifford on 1/8, she would like to move forward with a referral. Confirmed move in date is 2/24/25.  Vera (Exec Director): [phone]      Memory Care facilities currently in process:  [facility]. Tour completed 11/27.  [facility]. Tour scheduled 1/8/25. Have immediate openings and will accept EW. Family not requesting referral yet.  [facility]. Per Clifford on 1/20, she would like to move forward with a referral. Records sent 1/20.  [email or web address]    Memory Care facilities pending family review:  [facility].  [facility].  [facility].  [facility].  [facility].    Memory Care facilities declined:  [facility] (private pay only, no EW).  [facility] [address] (private pay only, no EW).  [facility] (no openings).  [facility]. Tour completed 11/29. Records sent 12/2/24. Declined 12/19/24 (don't feel they are an appropriate facility for pt's needs).  Lorraine ():  [email or web address]; [phone]  Isatu (director of nursing): [email or web address]  [facility].  Denied 12/26 (concerned about dementia with psychosis, history of hallucinations, high elopement risk, still on 1:1).  Nikki Noel: [email or web address]; Office [phone], Fax [phone]   [facility]. Not accepting EW as of 1/7/25.    Legal Status:  MI Commitment with [facility]  File Number: [id number]  Start and expiration date of commitment: 10/24/24 - 04/24/25    [facility] meds: Haldol, Clozaril, Risperdal, Invega, Zyprexa, Seroquel, Abilify    PPS/CM:  Shelby Chowdary: [phone]  [email or web address].    Contacts:  Maria C Aaron (Daughter): [phone]   Clifford Agnieszka (ex-wife): [phone]     No Del Angel (guardianship/conservatorship ): [phone]  [email or web address]    Caro Ross ([facility] probate court visitor for guardianship): [phone]  [email or web address]    Derrell Duff ([place] ): [phone]  [email or web address]     Upcoming Meetings and Dates/Important Information and next steps:

## 2025-02-24 NOTE — PLAN OF CARE
Patient was out visible intermittently in his room resting, he presented with anxious affect as he is worried about the discharge plan tomorrow, intake was adequate, vitals were stable at baseline, he was calm cooperative and compliant with medication management, denied pain and discomfort.   Problem: Psychotic Signs/Symptoms  Goal: Increased Participation and Engagement (Psychotic Signs/Symptoms)  Outcome: Progressing     Problem: Psychotic Signs/Symptoms  Goal: Improved Mood Symptoms (Psychotic Signs/Symptoms)  Outcome: Progressing   Goal Outcome Evaluation:    Plan of Care Reviewed With: patient

## 2025-02-24 NOTE — PLAN OF CARE
BEH IP Unit Acuity Rating Score (UARS)  Patient is given one point for every criteria they meet.    CRITERIA SCORING   On a 72 hour hold, court hold, committed, stay of commitment, or revocation. 1    Patient LOS on BEH unit exceeds 20 days. 1  LOS: 135   Patient under guardianship, 55+, otherwise medically complex, or under age 11. 1   Suicide ideation without relief of precipitating factors. 0   Current plan for suicide. 0   Current plan for homicide. 0   Imminent risk or actual attempt to seriously harm another without relief of factors precipitating the attempt. 1   Severe dysfunction in daily living (ex: complete neglect for self care, extreme disruption in vegetative function, extreme deterioration in social interactions). 1   Recent (last 7 days) or current physical aggression in the ED or on unit. 0   Restraints or seclusion episode in past 72 hours. 0   Recent (last 7 days) or current verbal aggression, agitation, yelling, etc., while in the ED or unit. 0   Active psychosis. 1   Need for constant or near constant redirection (from leaving, from others, etc).  0   Intrusive or disruptive behaviors. 0   Patient requires 3 or more hours of individualized nursing care per 8-hour shift (i.e. for ADLs, meds, therapeutic interventions). 0   TOTAL 6

## 2025-02-24 NOTE — PLAN OF CARE
Pt discharged to senior living as per orders, picked up by an ambulance at about 11:19 AM    Pt is pleasant and cooperative,medication compliant. Pt is looking forward to this discharge. He sat on the transport bed with no issue. All belongings returned to him. Discharge medication given to him and all signed out for.

## 2025-02-25 ENCOUNTER — PATIENT OUTREACH (OUTPATIENT)
Dept: FAMILY MEDICINE | Facility: CLINIC | Age: 65
End: 2025-02-25
Payer: MEDICARE

## 2025-02-25 NOTE — TELEPHONE ENCOUNTER
Hospital Problem List      Acute psychosis (H)    10/12/2024 - 2/24/2025 (135 days)      Discharge Plan:   Medications as above  Health Care Follow-up:   No appointments have been scheduled as your care will be managed by [facility].

## 2025-02-27 NOTE — TELEPHONE ENCOUNTER
Attempt # 1    Called # [phone]     Left a non detailed VM to call back at [phone] and ask for any available Triage Nurse.    Tello Chávez RN  [facility]

## 2025-03-03 NOTE — TELEPHONE ENCOUNTER
Unsure if follow up is needed.     Patient discharged to memory care unit at [facility].     Attempted call to ex-spouse, Clifford, to see if anything is needed.   Voicemail was full.     YAN ELIAS RN on 3/3/2025 at 11:31 AM   [facility]

## 2025-03-26 NOTE — TELEPHONE ENCOUNTER
Okay to use same-day, next day, virtual release or virtual slots (do not use provider approval slot).     weight-bearing as tolerated

## 2025-03-29 ENCOUNTER — DOCUMENTATION ONLY (OUTPATIENT)
Dept: OTHER | Facility: CLINIC | Age: 65
End: 2025-03-29
Payer: MEDICARE

## 2025-04-04 ENCOUNTER — HOSPITAL ENCOUNTER (INPATIENT)
Facility: CLINIC | Age: 65
End: 2025-04-04
Attending: EMERGENCY MEDICINE | Admitting: INTERNAL MEDICINE
Payer: MEDICARE

## 2025-04-04 DIAGNOSIS — F69 BEHAVIOR PROBLEM, ADULT: ICD-10-CM

## 2025-04-04 DIAGNOSIS — E83.52 HYPERCALCEMIA: ICD-10-CM

## 2025-04-04 DIAGNOSIS — E21.3 HYPERPARATHYROIDISM: Primary | ICD-10-CM

## 2025-04-04 DIAGNOSIS — I10 HYPERTENSION GOAL BP (BLOOD PRESSURE) < 130/80: ICD-10-CM

## 2025-04-04 DIAGNOSIS — Z86.59 HISTORY OF DEMENTIA: ICD-10-CM

## 2025-04-04 PROBLEM — F03.90 MAJOR NEUROCOGNITIVE DISORDER (H): Status: ACTIVE | Noted: 2025-04-04

## 2025-04-04 LAB
ANION GAP SERPL CALCULATED.3IONS-SCNC: 11 MMOL/L (ref 7–15)
BASOPHILS # BLD AUTO: 0 10E3/UL (ref 0–0.2)
BASOPHILS NFR BLD AUTO: 0 %
BUN SERPL-MCNC: 15.9 MG/DL (ref 8–23)
CALCIUM SERPL-MCNC: 10.5 MG/DL (ref 8.8–10.4)
CHLORIDE SERPL-SCNC: 102 MMOL/L (ref 98–107)
CREAT SERPL-MCNC: 0.89 MG/DL (ref 0.67–1.17)
EGFRCR SERPLBLD CKD-EPI 2021: >90 ML/MIN/1.73M2
EOSINOPHIL # BLD AUTO: 0.3 10E3/UL (ref 0–0.7)
EOSINOPHIL NFR BLD AUTO: 3 %
ERYTHROCYTE [DISTWIDTH] IN BLOOD BY AUTOMATED COUNT: 18.4 % (ref 10–15)
GLUCOSE SERPL-MCNC: 112 MG/DL (ref 70–99)
HCO3 SERPL-SCNC: 26 MMOL/L (ref 22–29)
HCT VFR BLD AUTO: 42.3 % (ref 40–53)
HGB BLD-MCNC: 13.2 G/DL (ref 13.3–17.7)
IMM GRANULOCYTES # BLD: 0.1 10E3/UL
IMM GRANULOCYTES NFR BLD: 1 %
LYMPHOCYTES # BLD AUTO: 2.7 10E3/UL (ref 0.8–5.3)
LYMPHOCYTES NFR BLD AUTO: 30 %
MCH RBC QN AUTO: 19.5 PG (ref 26.5–33)
MCHC RBC AUTO-ENTMCNC: 31.2 G/DL (ref 31.5–36.5)
MCV RBC AUTO: 63 FL (ref 78–100)
MONOCYTES # BLD AUTO: 0.8 10E3/UL (ref 0–1.3)
MONOCYTES NFR BLD AUTO: 9 %
NEUTROPHILS # BLD AUTO: 5.3 10E3/UL (ref 1.6–8.3)
NEUTROPHILS NFR BLD AUTO: 58 %
NRBC # BLD AUTO: 0 10E3/UL
NRBC BLD AUTO-RTO: 0 /100
PLAT MORPH BLD: NORMAL
PLATELET # BLD AUTO: 241 10E3/UL (ref 150–450)
POTASSIUM SERPL-SCNC: 3.9 MMOL/L (ref 3.4–5.3)
RBC # BLD AUTO: 6.76 10E6/UL (ref 4.4–5.9)
RBC MORPH BLD: NORMAL
SODIUM SERPL-SCNC: 139 MMOL/L (ref 135–145)
WBC # BLD AUTO: 9.1 10E3/UL (ref 4–11)

## 2025-04-04 PROCEDURE — G0378 HOSPITAL OBSERVATION PER HR: HCPCS

## 2025-04-04 PROCEDURE — 99285 EMERGENCY DEPT VISIT HI MDM: CPT | Mod: 25

## 2025-04-04 PROCEDURE — 36415 COLL VENOUS BLD VENIPUNCTURE: CPT | Performed by: HOSPITALIST

## 2025-04-04 PROCEDURE — 120N000001 HC R&B MED SURG/OB

## 2025-04-04 PROCEDURE — 85025 COMPLETE CBC W/AUTO DIFF WBC: CPT | Performed by: HOSPITALIST

## 2025-04-04 PROCEDURE — 250N000013 HC RX MED GY IP 250 OP 250 PS 637: Performed by: HOSPITALIST

## 2025-04-04 PROCEDURE — 82310 ASSAY OF CALCIUM: CPT | Performed by: HOSPITALIST

## 2025-04-04 PROCEDURE — 99222 1ST HOSP IP/OBS MODERATE 55: CPT | Performed by: HOSPITALIST

## 2025-04-04 RX ORDER — CYCLOBENZAPRINE HCL 10 MG
10 TABLET ORAL 3 TIMES DAILY PRN
Qty: 15 TABLET | Refills: 0 | Status: SHIPPED | OUTPATIENT
Start: 2025-04-04 | End: 2025-04-04

## 2025-04-04 RX ORDER — QUETIAPINE FUMARATE 50 MG/1
50 TABLET, EXTENDED RELEASE ORAL AT BEDTIME
Status: DISCONTINUED | OUTPATIENT
Start: 2025-04-04 | End: 2025-04-06

## 2025-04-04 RX ORDER — OLANZAPINE 10 MG/1
10 TABLET, FILM COATED ORAL 2 TIMES DAILY
Status: ON HOLD | COMMUNITY
End: 2025-05-12

## 2025-04-04 RX ORDER — POLYETHYLENE GLYCOL 3350 17 G/17G
17 POWDER, FOR SOLUTION ORAL 2 TIMES DAILY PRN
Status: DISCONTINUED | OUTPATIENT
Start: 2025-04-04 | End: 2025-05-13 | Stop reason: HOSPADM

## 2025-04-04 RX ORDER — SENNOSIDES 8.6 MG
1 TABLET ORAL DAILY
COMMUNITY

## 2025-04-04 RX ORDER — HALOPERIDOL 5 MG/ML
2 INJECTION INTRAMUSCULAR EVERY 6 HOURS PRN
Status: DISCONTINUED | OUTPATIENT
Start: 2025-04-04 | End: 2025-04-05

## 2025-04-04 RX ORDER — AMOXICILLIN 250 MG
1 CAPSULE ORAL 2 TIMES DAILY PRN
Status: DISCONTINUED | OUTPATIENT
Start: 2025-04-04 | End: 2025-05-13 | Stop reason: HOSPADM

## 2025-04-04 RX ORDER — ACETAMINOPHEN 650 MG/1
650 SUPPOSITORY RECTAL EVERY 4 HOURS PRN
Status: DISCONTINUED | OUTPATIENT
Start: 2025-04-04 | End: 2025-05-13 | Stop reason: HOSPADM

## 2025-04-04 RX ORDER — ONDANSETRON 4 MG/1
4 TABLET, ORALLY DISINTEGRATING ORAL EVERY 6 HOURS PRN
Status: DISCONTINUED | OUTPATIENT
Start: 2025-04-04 | End: 2025-05-13 | Stop reason: HOSPADM

## 2025-04-04 RX ORDER — OLANZAPINE 10 MG/2ML
5 INJECTION, POWDER, FOR SOLUTION INTRAMUSCULAR DAILY PRN
Status: DISCONTINUED | OUTPATIENT
Start: 2025-04-04 | End: 2025-04-05

## 2025-04-04 RX ORDER — AMOXICILLIN 250 MG
2 CAPSULE ORAL 2 TIMES DAILY PRN
Status: DISCONTINUED | OUTPATIENT
Start: 2025-04-04 | End: 2025-05-13 | Stop reason: HOSPADM

## 2025-04-04 RX ORDER — ONDANSETRON 2 MG/ML
4 INJECTION INTRAMUSCULAR; INTRAVENOUS EVERY 6 HOURS PRN
Status: DISCONTINUED | OUTPATIENT
Start: 2025-04-04 | End: 2025-05-13 | Stop reason: HOSPADM

## 2025-04-04 RX ORDER — PROCHLORPERAZINE MALEATE 5 MG/1
5 TABLET ORAL EVERY 6 HOURS PRN
Status: DISCONTINUED | OUTPATIENT
Start: 2025-04-04 | End: 2025-05-13 | Stop reason: HOSPADM

## 2025-04-04 RX ORDER — LIDOCAINE 4 G/G
1 PATCH TOPICAL EVERY 24 HOURS
Qty: 15 PATCH | Refills: 0 | Status: SHIPPED | OUTPATIENT
Start: 2025-04-04 | End: 2025-04-04

## 2025-04-04 RX ORDER — OLANZAPINE 15 MG/1
15 TABLET, FILM COATED ORAL AT BEDTIME
Status: ON HOLD | COMMUNITY
End: 2025-05-12

## 2025-04-04 RX ORDER — LIDOCAINE 40 MG/G
CREAM TOPICAL
Status: DISCONTINUED | OUTPATIENT
Start: 2025-04-04 | End: 2025-05-13 | Stop reason: HOSPADM

## 2025-04-04 RX ORDER — ACETAMINOPHEN 325 MG/1
650 TABLET ORAL EVERY 4 HOURS PRN
Status: DISCONTINUED | OUTPATIENT
Start: 2025-04-04 | End: 2025-05-13 | Stop reason: HOSPADM

## 2025-04-04 RX ADMIN — QUETIAPINE 50 MG: 50 TABLET, FILM COATED, EXTENDED RELEASE ORAL at 22:06

## 2025-04-04 RX ADMIN — MELATONIN TAB 3 MG 3 MG: 3 TAB at 22:06

## 2025-04-04 ASSESSMENT — ACTIVITIES OF DAILY LIVING (ADL)
ADLS_ACUITY_SCORE: 58

## 2025-04-04 ASSESSMENT — COLUMBIA-SUICIDE SEVERITY RATING SCALE - C-SSRS
1. IN THE PAST MONTH, HAVE YOU WISHED YOU WERE DEAD OR WISHED YOU COULD GO TO SLEEP AND NOT WAKE UP?: NO
2. HAVE YOU ACTUALLY HAD ANY THOUGHTS OF KILLING YOURSELF IN THE PAST MONTH?: NO
6. HAVE YOU EVER DONE ANYTHING, STARTED TO DO ANYTHING, OR PREPARED TO DO ANYTHING TO END YOUR LIFE?: NO

## 2025-04-04 NOTE — DISCHARGE INSTRUCTIONS
ADDITIONAL SUPPORTS:  Call or text 248 - National Suicide & Crisis Lifeline - if you feel like you may be in crisis or having thoughts of suicide.    [employer] [place] ([place]) provides support groups and educational programs. Visit www.namimn.org Helpline at 1-477.349.3469 or 748-912-6645 for further information.   [facility] ([place]ness) improves the lives of children and adults with mental illnesses and their families by providing free classes on mental illnesses andsupport groups for adults with mental illnesses, parents and family members.   For more information:  Phone: 308.433.8247  Toll free: 0-427-WKER-HELPS  Website: www.namihelps.org      The [place] [facility] provides a ngva-sd-iiuq approach to mental health recovery, support and wellness. Calls are answered by our team of professionally trained Certified Peer Specialists, who have first hand experience living with a mental health condition.   Open Monday-Saturday, 5pm to 10pm. Call [phone].       You may contact the [facility] for brief, short-term solution focused therapy support with your mental health goals. Call [phone] for more information or to schedule. (Virtual Appointments)          [facility]: Thank you for your interest in [facility]. Currently, patients are experiencing long waits for intake when referred within [facility]. Please know that you may contact your insurance carrier member services to learn more about scheduling in network therapy. Your insurance company will have lists of in network therapists that are not within [facility] and may have more immediate availability.       Get care started with an ongoing therapist by calling to schedule your intake at one of the following clinics:    [facility]: [phone]  [facility]  [phone]  [facility] [phone]  [facility] ([phone])  [phone]  [facility] [phone]  [facility]  [phone]   [facility] in [facility] [phone]  [facility] [phone]    Call an [facility] Behavioral Coordinator at [phone] for assistance in scheduling mental health appointments (Psychiatry/medication management, therapy, support groups, neuropsych testing, intake for programmatic care such as IOP/PHP program, etc.)
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
## 2025-04-04 NOTE — ED NOTES
Pt is still refusing to allow writer to draw his blood. Pt denies that he said we could draw him after he was done eating. Pt told writer and security to leave his room now.

## 2025-04-04 NOTE — ED NOTES
Received a call from Maria C Rangel (Daughter) [phone]. She was hoping to talk to the patient's DEC  who was unavailable at the time. Message to call Maria C paty left with DEC team.

## 2025-04-05 PROCEDURE — 99291 CRITICAL CARE FIRST HOUR: CPT | Performed by: NURSE PRACTITIONER

## 2025-04-05 PROCEDURE — 250N000013 HC RX MED GY IP 250 OP 250 PS 637: Performed by: NURSE PRACTITIONER

## 2025-04-05 PROCEDURE — 120N000001 HC R&B MED SURG/OB

## 2025-04-05 PROCEDURE — 250N000011 HC RX IP 250 OP 636: Performed by: INTERNAL MEDICINE

## 2025-04-05 PROCEDURE — 250N000013 HC RX MED GY IP 250 OP 250 PS 637: Performed by: HOSPITALIST

## 2025-04-05 PROCEDURE — 96372 THER/PROPH/DIAG INJ SC/IM: CPT | Performed by: INTERNAL MEDICINE

## 2025-04-05 PROCEDURE — G0378 HOSPITAL OBSERVATION PER HR: HCPCS

## 2025-04-05 PROCEDURE — 250N000013 HC RX MED GY IP 250 OP 250 PS 637: Performed by: INTERNAL MEDICINE

## 2025-04-05 PROCEDURE — 99232 SBSQ HOSP IP/OBS MODERATE 35: CPT | Performed by: INTERNAL MEDICINE

## 2025-04-05 RX ORDER — OLANZAPINE 10 MG/2ML
5 INJECTION, POWDER, FOR SOLUTION INTRAMUSCULAR EVERY 6 HOURS PRN
Status: DISCONTINUED | OUTPATIENT
Start: 2025-04-05 | End: 2025-04-19

## 2025-04-05 RX ORDER — CLONIDINE HYDROCHLORIDE 0.1 MG/1
0.1 TABLET ORAL 2 TIMES DAILY
Status: DISCONTINUED | OUTPATIENT
Start: 2025-04-05 | End: 2025-05-13 | Stop reason: HOSPADM

## 2025-04-05 RX ORDER — OLANZAPINE 5 MG/1
5 TABLET, ORALLY DISINTEGRATING ORAL ONCE
Status: COMPLETED | OUTPATIENT
Start: 2025-04-05 | End: 2025-04-05

## 2025-04-05 RX ORDER — ATORVASTATIN CALCIUM 40 MG/1
40 TABLET, FILM COATED ORAL DAILY
Status: DISCONTINUED | OUTPATIENT
Start: 2025-04-05 | End: 2025-05-03

## 2025-04-05 RX ORDER — OLANZAPINE 15 MG/1
15 TABLET, FILM COATED ORAL AT BEDTIME
Status: DISCONTINUED | OUTPATIENT
Start: 2025-04-05 | End: 2025-04-15

## 2025-04-05 RX ORDER — QUETIAPINE FUMARATE 25 MG/1
25 TABLET, FILM COATED ORAL 3 TIMES DAILY PRN
Status: DISCONTINUED | OUTPATIENT
Start: 2025-04-05 | End: 2025-04-23

## 2025-04-05 RX ORDER — SENNOSIDES 8.6 MG
1 TABLET ORAL DAILY
Status: DISCONTINUED | OUTPATIENT
Start: 2025-04-05 | End: 2025-05-13 | Stop reason: HOSPADM

## 2025-04-05 RX ORDER — CINACALCET 30 MG/1
30 TABLET, FILM COATED ORAL DAILY
Status: DISCONTINUED | OUTPATIENT
Start: 2025-04-05 | End: 2025-04-18

## 2025-04-05 RX ORDER — METOPROLOL SUCCINATE 50 MG/1
50 TABLET, EXTENDED RELEASE ORAL DAILY
Status: DISCONTINUED | OUTPATIENT
Start: 2025-04-05 | End: 2025-05-13 | Stop reason: HOSPADM

## 2025-04-05 RX ORDER — OLANZAPINE 10 MG/1
10 TABLET, FILM COATED ORAL 2 TIMES DAILY
Status: DISCONTINUED | OUTPATIENT
Start: 2025-04-05 | End: 2025-04-15

## 2025-04-05 RX ORDER — FUROSEMIDE 40 MG/1
40 TABLET ORAL DAILY
Status: DISCONTINUED | OUTPATIENT
Start: 2025-04-05 | End: 2025-04-29

## 2025-04-05 RX ORDER — LISINOPRIL 40 MG/1
40 TABLET ORAL DAILY
Status: DISCONTINUED | OUTPATIENT
Start: 2025-04-05 | End: 2025-05-13 | Stop reason: HOSPADM

## 2025-04-05 RX ORDER — AMLODIPINE BESYLATE 10 MG/1
10 TABLET ORAL DAILY
Status: DISCONTINUED | OUTPATIENT
Start: 2025-04-05 | End: 2025-05-13

## 2025-04-05 RX ADMIN — QUETIAPINE 50 MG: 50 TABLET, FILM COATED, EXTENDED RELEASE ORAL at 21:30

## 2025-04-05 RX ADMIN — ATORVASTATIN CALCIUM 40 MG: 40 TABLET, FILM COATED ORAL at 19:37

## 2025-04-05 RX ADMIN — CLONIDINE HYDROCHLORIDE 0.1 MG: 0.1 TABLET ORAL at 19:37

## 2025-04-05 RX ADMIN — OLANZAPINE 10 MG: 10 TABLET, FILM COATED ORAL at 19:36

## 2025-04-05 RX ADMIN — AMLODIPINE BESYLATE 10 MG: 10 TABLET ORAL at 12:25

## 2025-04-05 RX ADMIN — OLANZAPINE 5 MG: 5 TABLET, ORALLY DISINTEGRATING ORAL at 16:38

## 2025-04-05 RX ADMIN — CLONIDINE HYDROCHLORIDE 0.1 MG: 0.1 TABLET ORAL at 14:03

## 2025-04-05 RX ADMIN — FUROSEMIDE 40 MG: 40 TABLET ORAL at 12:25

## 2025-04-05 RX ADMIN — QUETIAPINE FUMARATE 12.5 MG: 25 TABLET ORAL at 09:09

## 2025-04-05 RX ADMIN — METOPROLOL SUCCINATE 50 MG: 50 TABLET, EXTENDED RELEASE ORAL at 12:25

## 2025-04-05 RX ADMIN — SENNOSIDES 1 TABLET: 8.6 TABLET ORAL at 12:25

## 2025-04-05 RX ADMIN — MELATONIN TAB 3 MG 3 MG: 3 TAB at 21:30

## 2025-04-05 RX ADMIN — LISINOPRIL 40 MG: 40 TABLET ORAL at 12:25

## 2025-04-05 RX ADMIN — QUETIAPINE FUMARATE 25 MG: 25 TABLET ORAL at 16:03

## 2025-04-05 RX ADMIN — CINACALCET 30 MG: 30 TABLET ORAL at 14:03

## 2025-04-05 RX ADMIN — OLANZAPINE 5 MG: 10 INJECTION, POWDER, FOR SOLUTION INTRAMUSCULAR at 13:21

## 2025-04-05 ASSESSMENT — ACTIVITIES OF DAILY LIVING (ADL)
ADLS_ACUITY_SCORE: 58
DEPENDENT_IADLS:: CLEANING;COOKING;LAUNDRY;SHOPPING;MEAL PREPARATION;MEDICATION MANAGEMENT;TRANSPORTATION;MONEY MANAGEMENT
ADLS_ACUITY_SCORE: 58
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
## 2025-04-05 NOTE — PLAN OF CARE
Estevan Aaron  April 4, 2025  Plan of Care Hand-off Note     Patient Recommended Care Path: observation    Clinical Substantiation:  It is the recommendation of this writer that pt remain under observation status pending a psychiatry consult to formulate an appropriate medication plan for ongoing management of advancing dementia. Per memory care facility staff's report to EMS, pt is unable to return to the facility without further stabilization in the hospital first due to his aggression. Pt's daughter/legal guardian reports that his medication dosages have been lowered since transferring from [facility] to the [facility] care facility. Pt is unable to track a conversation with this writer and exhibits significant loose associations. Pt is not oriented to time/date, place, or situation.    Goals for crisis stabilization:  coordinate transfer back to the community with support of a psychiatry consult    Next steps for Care Team:  coordinate transfer back to the community with support of a psychiatry consult    Treatment Objectives Addressed:  assessing safety    Therapeutic Interventions:  Engaged in guided discovery, explored patient's perspectives and helped expand them through socratic dialogue.    Has a specific means been identified for suicidal.homicide actions: No    Patient coping skills attempted to reduce the crisis:  Pt engaged in a conversation with this writer about his mental health.    Collateral contact information:    Maria C Rangel, daughter/co-guardian, PH: [phone]  Clifford Aaron, ex-wife/co-guardian, PH: [phone]  Shelby Chowdary,  at [facility], [phone] (cell) and [phone] (office).       Legal Status: Guardian/ad litum                                                   Reviewed court records: yes     Psychiatry Consult: Patient has Psychiatry Consult Order    Cecilia Snider, Riverview Psychiatric CenterSW
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
## 2025-04-05 NOTE — ED NOTES
This writer e-mailed [employer] updated guardianship paperwork for pt indicating that both pt's ex-wife Clifford Aaron and his daughter Maria C Rangel are his legal guardians.    Cecilia Snider, RENEESW
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
## 2025-04-05 NOTE — PROGRESS NOTES
Patient became agitated due to shoes missing, attempted to wander the halls, but was redirected. Writer returned patient's shoes to patient but left all other belongings locked. Patient calmed down.

## 2025-04-05 NOTE — H&P
[facility]    History and Physical - Hospitalist Service       Date of Admission:  4/4/2025    Assessment & Plan      Estevan Aaron is a 64 year old male admitted on 4/4/2025.  Past history of HTN, HLD, hyperparathyroidism, dementia who presents from his [facility] facility with aggressive behaviors.  Reportedly the facility has refused to take him back until seen by a provider and had medications adjusted.       Alzheimer's dementia with agitation  *reportedly aggressive behaviors at [facility] - details unknown - has been calm and cooperative with staff in the ED  *was hospitalized in inpatient Psych from Oct 2024 through 2/24/25; appears he was discharged on just a low dose of oral zyprexa  - BMP, CBC, UA pending  - seroquel 50 mg at bedtime and 12.5 mg bid prn  - hold zyprexa  - Psych consult    HTN  - continue PTA amlodipine, clonidine, lasix, lisinopril, metoprolol once verified and BMP resulted    HLD  - continue PTA atorvastatin once verified    Hyperparathyroidism  - does not appear to be on treatment, BMP pending            Diet: Regular Diet Adult    DVT Prophylaxis: Pneumatic Compression Devices  Alberto Catheter: Not present  Lines: None     Cardiac Monitoring: None  Code Status:  Full code by default - will need to discuss with POA tomorrow    Clinically Significant Risk Factors Present on Admission                   # Hypertension: Noted on problem list     # Dementia: noted on problem list           # Financial/Environmental Concerns: none         Disposition Plan     Medically Ready for Discharge: Anticipated Tomorrow           Oleksandr Kong MD  Hospitalist Service  [facility]  Securely message with [employer] (more info)  Text page via [employer] Paging/Directory     ______________________________________________________________________    Chief Complaint   Agitation     History is obtained from discussion with Ed provider.   The patient is unable to  provide any history due to his underlying dementia.      History of Present Illness   Estevan Aaron is a 64 year old male who presents from his memory care facility for aggressive behaviors.  The details of these behaviors are unknown, but the facility reported they would not accept him back until he was seen by a provider and had his medications adjusted.    He is evaluated in [facility].  Calm and cooperative.  Has no complaints on review of systems, reports feeling at his baseline.          Past Medical History    Past Medical History:   Diagnosis Date    Kidney stone     Serum calcium elevated     Tobacco use disorder     tobacco quit line referral done 4/19/06       Past Surgical History   Past Surgical History:   Procedure Laterality Date    ANESTHESIA OUT OF OR MRI N/A 10/28/2024    Procedure: 1.5 MRI Brain;  Surgeon: GENERIC ANESTHESIA PROVIDER;  Location: [facility] OR    ROTATOR CUFF REPAIR RT/LT Right 2021       Prior to Admission Medications   Prior to Admission Medications   Prescriptions Last Dose Informant Patient Reported? Taking?   OLANZapine (ZYPREXA) 2.5 MG tablet   No No   Sig: Take 1 tablet (2.5 mg) by mouth at bedtime.   amLODIPine (NORVASC) 10 MG tablet   No No   Sig: Take 1 tablet (10 mg) by mouth daily   amLODIPine (NORVASC) 10 MG tablet   No No   Sig: Take 1 tablet (10 mg) by mouth daily.   atorvastatin (LIPITOR) 40 MG tablet   No No   Sig: Take 1 tablet (40 mg) by mouth daily.   cinacalcet (SENSIPAR) 30 MG tablet   No No   Sig: Take 1 tablet (30 mg) by mouth daily.   cloNIDine (CATAPRES) 0.1 MG tablet   No No   Sig: Take 1 tablet (0.1 mg) by mouth 2 times daily   cloNIDine (CATAPRES) 0.1 MG tablet   No No   Sig: Take 1 tablet (0.1 mg) by mouth 2 times daily.   furosemide (LASIX) 40 MG tablet   No No   Sig: Take 1 tablet (40 mg) by mouth daily.   lisinopril (ZESTRIL) 40 MG tablet   No No   Sig: Take 1 tablet (40 mg) by mouth daily.   metoprolol succinate ER (TOPROL XL) 50 MG 24 hr tablet   No No   Sig:  Take 1 tablet (50 mg) by mouth daily.      Facility-Administered Medications: None           Physical Exam   Vital Signs: Temp: 98  F (36.7  C) Temp src: Oral BP: 127/63 Pulse: 61   Resp: 18 SpO2: 94 % O2 Device: None (Room air)    Weight: 0 lbs 0 oz    General Appearance: obese male in NAD  Respiratory: lungs ctab   Cardiovascular: rrr, normal s1/s2 without murmur  GI: abdomen soft, normal bowel sounds, nontender, nondistended  Lymph/Hematologic: 1-2+ bilateral lower extremity edema   Musculoskeletal: extremities warm and well perfused  Neurologic: oriented to self, partially to date, recognizes being in the hospital, CN grossly intact, gross motor movements intact    Medical Decision Making       50 MINUTES SPENT BY ME on the date of service doing chart review, history, exam, documentation & further activities per the note.      Data         Imaging results reviewed over the past 24 hrs:   No results found for this or any previous visit (from the past 24 hours).

## 2025-04-05 NOTE — CODE/RAPID RESPONSE
[facility]    RRT Note  4/5/2025   Time Called: 1610    Code Status: Full Code    I was called to evaluate Estevan Aaron, who is a 64 year old male with a significant past medical history for Alzheimer's dementia, hypertension, hyperlipidemia, and hypothyroidism who was admitted on 4/4/2025 for behavioral disturbance .    Assessment & Plan     Confusion, agitation, attempting to elope, aggression: patient necessitating restraints that were short lived as patient was able to remove. He, however, was then redirectable. Spent much time with patient talking as patient seems to be attempting to louis and fix things in room. He is offered something to drink and is for the time redirectable and being pleasant.     Patient is amendable to taking medication and explained the purpose to help him relax. Explained the criteria for remaining out of restraints and our purpose to keep him safe while here in the hospital. Given patient seemingly wanting to find something to do and keep busy. We were able to find a puzzle for him to work on, which he seems to be interested in prior to leaving floor.     Explained to staff to try to redirect and reassure as able, however, everyone safety is top priority and to not hesitate to call code 21 if patient becomes aggressive.         Discussed with bedside staff, security      VA Willoughby CNP  [facility]  Securely message with the Vocera Web Console (learn more here)  Text page via [employer] Paging/Directory          Physical Exam   Vital Signs with Ranges:  Temp:  [98.3  F (36.8  C)-98.5  F (36.9  C)] 98.5  F (36.9  C)  Pulse:  [57-74] 74  Resp:  [18-20] 20  BP: (126-167)/(72-85) 156/76  SpO2:  [94 %-96 %] 96 %  I/O last 3 completed shifts:  In: 360 [P.O.:360]  Out: -     Orthostatic:                Physical Exam  Constitutional:       Appearance: He is obese.   HENT:      Mouth/Throat:      Mouth: Mucous membranes are dry.  "  Pulmonary:      Effort: Pulmonary effort is normal.   Musculoskeletal:         General: Normal range of motion.   Skin:     Comments: Right knee abrasion   Neurological:      Mental Status: He is alert.                IMAGING: (X-ray/CT/MRI)   No results found for this or any previous visit (from the past 24 hours).    CBC with Diff:  Recent Labs   Lab Test 04/04/25 2110   WBC 9.1   HGB 13.2*   MCV 63*         No results found for: \"RETICABSCT\"  No results found for: \"RETP\"    Comprehensive Metabolic Panel:  Recent Labs   Lab 04/04/25 2110      POTASSIUM 3.9   CHLORIDE 102   CO2 26   ANIONGAP 11   *   BUN 15.9   CR 0.89   GFRESTIMATED >90   UDAY 10.5*         Time Spent on this Encounter       CRITICAL CARE TIME  I spent 35 minutes of critical care time on the unit/floor managing the care of Estevan Aaron. Upon evaluation, this patient had a high probability of imminent or life-threatening deterioration due to agitation, agression which required my direct attention, intervention, and personal management. 100% of my time was spent at the bedside counseling the patient and/or coordinating care regarding services listed in this note.   "

## 2025-04-05 NOTE — PHARMACY-ADMISSION MEDICATION HISTORY
Pharmacist Admission Medication History    Admission medication history is complete. The information provided in this note is only as accurate as the sources available at the time of the update.    Information Source(s): Facility (U/NH/) medication list/MAR via phone    Pertinent Information: None    Changes made to PTA medication list:  Added: Senna,melatonin   Deleted: Zyprexa 2.5 mg  Changed: Zyprexa 2.5 mg to Zyprexa 10 mg BID and Zyprexa 15 mg at HS.     Allergies reviewed with patient and updates made in EHR: unable to assess    Medication History Completed By: Chaitanya Cooley RPH 4/4/2025 9:46 PM    PTA Med List   Medication Sig Last Dose/Taking    amLODIPine (NORVASC) 10 MG tablet Take 1 tablet (10 mg) by mouth daily 4/4/2025 Morning    atorvastatin (LIPITOR) 40 MG tablet Take 1 tablet (40 mg) by mouth daily. 4/3/2025 Evening    cinacalcet (SENSIPAR) 30 MG tablet Take 1 tablet (30 mg) by mouth daily. 4/4/2025 Morning    cloNIDine (CATAPRES) 0.1 MG tablet Take 1 tablet (0.1 mg) by mouth 2 times daily 4/4/2025 Morning    furosemide (LASIX) 40 MG tablet Take 1 tablet (40 mg) by mouth daily. 4/4/2025 Morning    lisinopril (ZESTRIL) 40 MG tablet Take 1 tablet (40 mg) by mouth daily. 4/4/2025 Morning    melatonin 3 MG tablet Take 3 mg by mouth at bedtime. 4/3/2025 Bedtime    metoprolol succinate ER (TOPROL XL) 50 MG 24 hr tablet Take 1 tablet (50 mg) by mouth daily. 4/4/2025 Morning    OLANZapine (ZYPREXA) 10 MG tablet 10 mg 2 times daily. At 7am and 5pm 4/4/2025 Morning    OLANZapine (ZYPREXA) 15 MG tablet Take 15 mg by mouth at bedtime. 4/3/2025 Evening    sennosides (SENOKOT) 8.6 MG tablet Take 1 tablet by mouth daily. 4/4/2025 Morning

## 2025-04-05 NOTE — PROGRESS NOTES
Code green was called due to patient continuously leaving his room, moving items out of his room, swearing at staff, and punching soft items as a means to show aggression. Patient was agreeable to taking an increased dose of oral seroquel, but soon after became more agitated, swearing at security, and taking off parts of his bed with intention of using the item against staff. Patient was placed in soft restraints by security and psych tech. With house PA still present the patient was able to break out of his soft restraints; hard restraints were considered, but patient became more redirectable, agreeing to sit in the chair with puzzles, word search, and diet allegra. House PA ordered one time dose of Olanzapine ODT 5mg and patient took the medication without issue. Continuing to monitor.

## 2025-04-05 NOTE — CONSULTS
Care Management Initial Consult    General Information  Assessment completed with: Other, VM-chart review, guardian, ex-spouse Clifford  Type of CM/SW Visit: Initial Assessment    Primary Care Provider verified and updated as needed: Yes ([facility] at the facility)   Readmission within the last 30 days: no previous admission in last 30 days      Reason for Consult: discharge planning  Advance Care Planning: Advance Care Planning Reviewed: present on chart          Communication Assessment  Patient's communication style: spoken language (English or Bilingual)             Cognitive  Cognitive/Neuro/Behavioral: WDL                      Living Environment:   People in home: facility resident     Current living Arrangements: assisted living  Name of Facility: [facility]   Able to return to prior arrangements: other (see comments)  Living Arrangement Comments: cannot return until behavior manageable    Family/Social Support:  Care provided by: self, other (see comments) (staff)  Provides care for: no one  Marital Status:   Support system: Other (specify), Children          Description of Support System:           Current Resources:   Patient receiving home care services: No        Community Resources: County Worker  Equipment currently used at home: none  Supplies currently used at home:      Employment/Financial:  Employment Status: unemployed        Financial Concerns: none           Does the patient's insurance plan have a 3 day qualifying hospital stay waiver?  No    Lifestyle & Psychosocial Needs:  Social Drivers of Health     Food Insecurity: Low Risk  (10/12/2024)    Food Insecurity     Within the past 12 months, did you worry that your food would run out before you got money to buy more?: No     Within the past 12 months, did the food you bought just not last and you didn t have money to get more?: No   Depression: Not at risk (2/20/2024)    PHQ-2     PHQ-2 Score: 0    Housing Stability: High Risk (10/12/2024)    Housing Stability     Do you have housing? : No     Are you worried about losing your housing?: No   Tobacco Use: High Risk (10/28/2024)    Patient History     Smoking Tobacco Use: Every Day     Smokeless Tobacco Use: Never     Passive Exposure: Not on file   Financial Resource Strain: Low Risk  (10/12/2024)    Financial Resource Strain     Within the past 12 months, have you or your family members you live with been unable to get utilities (heat, electricity) when it was really needed?: No   Alcohol Use: Not on file   Transportation Needs: Low Risk  (10/12/2024)    Transportation Needs     Within the past 12 months, has lack of transportation kept you from medical appointments, getting your medicines, non-medical meetings or appointments, work, or from getting things that you need?: No   Physical Activity: Insufficiently Active (2/20/2024)    Exercise Vital Sign     Days of Exercise per Week: 2 days     Minutes of Exercise per Session: 10 min   Interpersonal Safety: High Risk (10/28/2024)    Interpersonal Safety     Do you feel physically and emotionally safe where you currently live?: No     Within the past 12 months, have you been hit, slapped, kicked or otherwise physically hurt by someone?: No     Within the past 12 months, have you been humiliated or emotionally abused in other ways by your partner or ex-partner?: No   Stress: Stress Concern Present (2/20/2024)    [employer] [place] [employer] - Occupational Stress Questionnaire     Feeling of Stress : To some extent   Social Connections: Unknown (2/20/2024)    Social Connection and Isolation Panel [NHANES]     Frequency of Communication with Friends and Family: Not on file     Frequency of Social Gatherings with Friends and Family: Once a week     Attends Sabianism Services: Not on file     Active Member of Clubs or Organizations: Not on file     Attends Club or Organization Meetings: Not on file      "Marital Status: Not on file   Health Literacy: Not on file       Functional Status:  Prior to admission patient needed assistance:   Dependent ADLs:: Independent, Ambulation-no assistive device  Dependent IADLs:: Cleaning, Cooking, Laundry, Shopping, Meal Preparation, Medication Management, Transportation, Money Management       Mental Health Status:          Chemical Dependency Status:                Values/Beliefs:  Spiritual, Cultural Beliefs, Faith Practices, Values that affect care: no               Discussed  Partnership in Safe Discharge Planning  document with patient/family: No    Additional Information:  Spoke with guardian ex-spouse Clifford over the phone.  Introduced self and explained role in discharge planning.      Provided Clifford information on the Medicare Observation Notice; copy left in room.      Patient resides at:    [facility], [address] [place] [address], [place]   Phone:  [phone]  Fax:  [phone]  Contact:  [facility] Director cell phone: [phone]  Director:  Vera  Pharmacy: - need to obtain    Patient was admitted with aggression toward other residents.  Santos has dementia.  Santos is independent in ambulation without device; SBA with ADL's.  Receives medication management and meals.  He has been refusing showers.    He is followed by the facility provider [facility].    Clifford states \"he needs to be in racheal psych\".  He was previously in a psych unit at [facility].  Clifford states when he was transferred out to [place] he was on \"only a fraction of his usual medications\".  She states he wanted to go back there.  She states he has a care manager who states he is on a civil commitment and can provide the email pertaining to that however she cannot currently do as is driving out to see her daughter.    Relayed to Clifford that he does have a psychiatry order in place and likely not see him until Monday.    He cannot return to the facility until he has " adequate behavior control with medication changes or goes to an inpatient psychiatry unit.    Next Steps: follow for discharge plan; inpatient psych vs return to [facility]/ with adequately managed behavior.      Rayna Govea RN  Inpatient Float Care Coordinator  [facility]  [email or web address]

## 2025-04-05 NOTE — PROGRESS NOTES
Observation goals  PRIOR TO DISCHARGE        Comments: -diagnostic tests and consults completed and resulted- not met  -vital signs normal or at patient baseline- not met  -safe disposition plan has been identified- not met  Nurse to notify provider when observation goals have been met and patient is ready for discharge.

## 2025-04-05 NOTE — PLAN OF CARE
PRIMARY Concern: admitted for aggressive behavior at [facility]. History of Alzheimer's dementia with agitation   SAFETY RISK Concerns (fall risk, behaviors, etc.): fall risk     Aggression Tool Color: Red at times. Swearing at staff; hitting objects in room, grabbing and pushing staff.  Isolation/Type: N/A  Tests/Procedures for NEXT shift: Am labs  Consults? (Pending/following, signed-off?) Psych/ CM consult   Where is patient from? (Home, TCU, etc.): memory care  Other Important info for NEXT shift: Patient received 12.5mg and 25mg (total 37.5mg) of oral seroquel; and zyprexa 5mg IM and 5mg ODT (total of 10mg). Patient did well with a puzzle and crossword towards the end of the shift. Still attempting to get urine sample.  Anticipated DC date & active delays: TBD. Pending placement     SUMMARY NOTE: AOx1. Sitter at bedside    Orientation/Cognitive: confused   Observation Goals (Met/ Not Met): Obs   Mobility Level/Assist Equipment: Ind  Antibiotics & Plan (IV/po, length of tx l ft): N/A  Pain Management: denies pain  Complete Pain Reassessment: Y/N no Due next: next shift   Tele/VS/O2: VSS on Ra. Ext. BP  ABNL Lab/BG: Am lab  Diet: reg  Bowel/Bladder: Cont incont @ times  Skin Concerns: scab at right shin  Drains/Devices: No PIV  Patient Stated Goal for Today: Sleep

## 2025-04-05 NOTE — CODE/RAPID RESPONSE
[facility]    RRT Note/code 21  4/5/2025   Time Called: 1314    Code Status: Full Code    I was called to evaluate Estevan Aaron, who is a 64 year old male with a significant past medical history for Alzheimer's dementia, hypertension, hyperlipidemia, and hypothyroidism who was admitted on 4/4/2025 for behavioral disturbance.    Assessment & Plan     Agitation and attempting to elope in the setting of Alzheimer's dementia: Code 21 called due to patient elopement attempts and aggression with staff.    Upon arrival to floor patient is already being escorted willingly back to his room.  He does seem a bit agitated but is cooperative at the time that I see him.  Patient does have as needed medications available and recommended to give IM as needed medication so that patient will not escalate more.    When seeing patient he is alert, appears agitated and a bit anxious, is cooperating at this time however.  He denies any pain at this time.  He is having some paranoid thoughts that people are lying to him.       INTERVENTIONS:  -give prn zyprexa   -continue 1 to 1      Discussed with nursing staff       Catherine Tang, DNP APRN CNP  [facility]  Securely message with the [employer]oc[employer] Web Console (learn more here)  Text page via [employer] Paging/Directory          Physical Exam   Vital Signs with Ranges:  Temp:  [98  F (36.7  C)-98.5  F (36.9  C)] 98.5  F (36.9  C)  Pulse:  [57-74] 74  Resp:  [18-20] 20  BP: (126-167)/(63-85) 156/76  SpO2:  [94 %-96 %] 96 %  No intake/output data recorded.    Orthostatic:                Physical Exam  Constitutional:       Appearance: He is obese.   Pulmonary:      Effort: Pulmonary effort is normal.   Musculoskeletal:         General: Normal range of motion.   Skin:     General: Skin is warm and dry.   Neurological:      Mental Status: He is alert.   Psychiatric:         Mood and Affect: Mood is anxious.         Behavior: Behavior is agitated.     "     Thought Content: Thought content is paranoid.          Data     CBC with Diff:  Recent Labs   Lab Test 04/04/25 2110   WBC 9.1   HGB 13.2*   MCV 63*         No results found for: \"RETICABSCT\"  No results found for: \"RETP\"    Comprehensive Metabolic Panel:  Recent Labs   Lab 04/04/25 2110      POTASSIUM 3.9   CHLORIDE 102   CO2 26   ANIONGAP 11   *   BUN 15.9   CR 0.89   GFRESTIMATED >90   UDAY 10.5*         Time Spent on this Encounter   I spent 30 minutes on the unit/floor managing the care of Estevan Aaron. Over 50% of my time was spent counseling the patient and/or coordinating care regarding services listed in this note.      "

## 2025-04-05 NOTE — PROGRESS NOTES
Observation goals  PRIOR TO DISCHARGE        Comments: -diagnostic tests and consults completed and resulted: Not met   -vital signs normal or at patient baseline: Not met   -safe disposition plan has been identified: Not met   Nurse to notify provider when observation goals have been met and patient is ready for discharge.

## 2025-04-05 NOTE — PLAN OF CARE
Goal Outcome Evaluation:      Plan of Care Reviewed With: guardian          Outcome Evaluation: care management will follow for discharge planning; return to [facility]/ vs geripsych unit.

## 2025-04-05 NOTE — PROVIDER NOTIFICATION
MD Notification    Notified Person: MD    Notified Person Name: Oleksandr Kong    Notification Date/Time: 2109    Notification Interaction: Voctrino    Purpose of Notification: Is patient holdable? if yes, can he get order for IM. He is becoming aggressive. Does have a hx of hitting staff. Any Concerns? Thanks    Orders Received: Yes he is holdable. IM zyprexa and IV haldol ordered    Comments:

## 2025-04-06 PROCEDURE — 120N000001 HC R&B MED SURG/OB

## 2025-04-06 PROCEDURE — 250N000011 HC RX IP 250 OP 636: Mod: JW | Performed by: INTERNAL MEDICINE

## 2025-04-06 PROCEDURE — 99232 SBSQ HOSP IP/OBS MODERATE 35: CPT | Performed by: INTERNAL MEDICINE

## 2025-04-06 PROCEDURE — 96372 THER/PROPH/DIAG INJ SC/IM: CPT | Performed by: INTERNAL MEDICINE

## 2025-04-06 PROCEDURE — 250N000013 HC RX MED GY IP 250 OP 250 PS 637: Performed by: INTERNAL MEDICINE

## 2025-04-06 PROCEDURE — G0378 HOSPITAL OBSERVATION PER HR: HCPCS

## 2025-04-06 PROCEDURE — 250N000013 HC RX MED GY IP 250 OP 250 PS 637: Performed by: HOSPITALIST

## 2025-04-06 RX ORDER — QUETIAPINE FUMARATE 50 MG/1
50 TABLET, EXTENDED RELEASE ORAL 3 TIMES DAILY
Status: DISCONTINUED | OUTPATIENT
Start: 2025-04-06 | End: 2025-04-07

## 2025-04-06 RX ORDER — QUETIAPINE FUMARATE 25 MG/1
25 TABLET, FILM COATED ORAL ONCE
Status: COMPLETED | OUTPATIENT
Start: 2025-04-06 | End: 2025-04-06

## 2025-04-06 RX ADMIN — OLANZAPINE 10 MG: 10 TABLET, FILM COATED ORAL at 08:58

## 2025-04-06 RX ADMIN — CINACALCET 30 MG: 30 TABLET ORAL at 08:58

## 2025-04-06 RX ADMIN — SENNOSIDES AND DOCUSATE SODIUM 1 TABLET: 50; 8.6 TABLET ORAL at 16:01

## 2025-04-06 RX ADMIN — QUETIAPINE FUMARATE 25 MG: 25 TABLET ORAL at 02:49

## 2025-04-06 RX ADMIN — LISINOPRIL 40 MG: 40 TABLET ORAL at 08:59

## 2025-04-06 RX ADMIN — QUETIAPINE FUMARATE 50 MG: 50 TABLET, EXTENDED RELEASE ORAL at 19:33

## 2025-04-06 RX ADMIN — QUETIAPINE FUMARATE 25 MG: 25 TABLET ORAL at 15:58

## 2025-04-06 RX ADMIN — FUROSEMIDE 40 MG: 40 TABLET ORAL at 08:58

## 2025-04-06 RX ADMIN — ATORVASTATIN CALCIUM 40 MG: 40 TABLET, FILM COATED ORAL at 19:33

## 2025-04-06 RX ADMIN — CLONIDINE HYDROCHLORIDE 0.1 MG: 0.1 TABLET ORAL at 08:58

## 2025-04-06 RX ADMIN — QUETIAPINE FUMARATE 25 MG: 25 TABLET ORAL at 11:15

## 2025-04-06 RX ADMIN — AMLODIPINE BESYLATE 10 MG: 10 TABLET ORAL at 08:58

## 2025-04-06 RX ADMIN — METOPROLOL SUCCINATE 50 MG: 50 TABLET, EXTENDED RELEASE ORAL at 08:59

## 2025-04-06 RX ADMIN — OLANZAPINE 5 MG: 10 INJECTION, POWDER, FOR SOLUTION INTRAMUSCULAR at 18:34

## 2025-04-06 RX ADMIN — SENNOSIDES 1 TABLET: 8.6 TABLET ORAL at 08:58

## 2025-04-06 RX ADMIN — CLONIDINE HYDROCHLORIDE 0.1 MG: 0.1 TABLET ORAL at 19:33

## 2025-04-06 ASSESSMENT — ACTIVITIES OF DAILY LIVING (ADL)
ADLS_ACUITY_SCORE: 64
ADLS_ACUITY_SCORE: 64
ADLS_ACUITY_SCORE: 62
ADLS_ACUITY_SCORE: 64
ADLS_ACUITY_SCORE: 63
ADLS_ACUITY_SCORE: 62
ADLS_ACUITY_SCORE: 64
ADLS_ACUITY_SCORE: 63
ADLS_ACUITY_SCORE: 58
ADLS_ACUITY_SCORE: 64
ADLS_ACUITY_SCORE: 63
ADLS_ACUITY_SCORE: 64
ADLS_ACUITY_SCORE: 62
ADLS_ACUITY_SCORE: 63
ADLS_ACUITY_SCORE: 64

## 2025-04-06 NOTE — PLAN OF CARE
Goal Outcome Evaluation:  PRIMARY Concern: Admitted for aggressive behavior at [facility]. History of Alzheimer's dementia with agitation   SAFETY RISK Concerns (fall risk, behaviors, etc.): fall risk     Aggression Tool Color: Yellow- some swearing and shouting at staff, but otherwise redirectable.  Tests/Procedures for NEXT shift: Am labs  Consults? (Pending/following, signed-off?) Psych/ CM consult   Where is patient from? (Home, TCU, etc.): memory care  Other Important info for NEXT shift: 1:1 sitter at bedside. IM Zyprexa given at 1834  Anticipated DC date & active delays: TBD. Pending placement      SUMMARY NOTE: Alert and oriented to self only. Sitter at bedside    Orientation/Cognitive: confused   Observation Goals (Met/ Not Met): Obs   Mobility Level/Assist Equipment:  SBA  Antibiotics & Plan (IV/po, length of tx l ft): N/A  Pain Management: denies pain  Complete Pain Reassessment: Y/N no Due next: next shift   Tele/VS/O2: Elevated BP, took morning BP medications. Can be difficult to obtain full set of vitals.  ABNL Lab/BG: Am lab  Diet: regular  Bowel/Bladder: Cont/ incont @ times  Skin Concerns: scab at right shin  Drains/Devices: No PIV  Patient Stated Goal for Today: Sleep

## 2025-04-06 NOTE — PLAN OF CARE
Goal Outcome Evaluation:  PRIMARY Concern: Admitted for aggressive behavior at [facility]. History of Alzheimer's dementia with agitation   SAFETY RISK Concerns (fall risk, behaviors, etc.): fall risk     Aggression Tool Color: Yellow- pacing and attempting to leave room while Awake   Isolation/Type: N/A  Tests/Procedures for NEXT shift: Am labs  Consults? (Pending/following, signed-off?) Psych/ CM consult   Where is patient from? (Home, TCU, etc.): memory care  Other Important info for NEXT shift: 1:1 sitter at bedside. PRN Seroquel given x 1 Still attempting to get urine sample.  Anticipated DC date & active delays: TBD. Pending placement      SUMMARY NOTE: Alert and oriented to self only. Sitter at bedside    Orientation/Cognitive: confused   Observation Goals (Met/ Not Met): Obs   Mobility Level/Assist Equipment:  SBA  Antibiotics & Plan (IV/po, length of tx l ft): N/A  Pain Management: denies pain  Complete Pain Reassessment: Y/N no Due next: next shift   Tele/VS/O2: VSS   ABNL Lab/BG: Am lab  Diet: regular  Bowel/Bladder: Cont/ incont @ times  Skin Concerns: scab at right shin  Drains/Devices: No PIV  Patient Stated Goal for Today: Sleep  Observation goals  PRIOR TO DISCHARGE        Comments: -diagnostic tests and consults completed and resulted: Not met   -vital signs normal or at patient baseline: Not met   -safe disposition plan has been identified: Not met   Nurse to notify provider when observation goals have been met and patient is ready for discharge.

## 2025-04-06 NOTE — PLAN OF CARE
Goal Outcome Evaluation:       ..PRIMARY Concern: Confusion, increased agitation/aggression,hx dementia with behaviors  SAFETY RISK Concerns: fall risk  Aggression Tool Color: Yellow-restless, wandering/anxiety  Isolation/Type: none  Tests/Procedures for NEXT shift: AM labs scheduledl  Consults? (Pending/following, signed-off?): Psych consults  Where is patient from? (Home, TCU, etc.): Memory care  Other Important info for NEXT shift: 1:1 sitter at bedside  Anticipated DC date & active delays: TBD, pending placement  _____________________________________________________________________________  SUMMARY NOTE:   Orientation/Cognitive: Alert to self/, confused. Baseline dementia   Observation Goals (Met/ Not Met): Not met  Mobility Level/Assist Equipment: Independent/ 1:1 SBA  Pain Management: denies  Tele/VS/O2: Refused, confuse  Diet: Regular  Bowel/Bladder: BR, incontinent at times  Skin Concerns: Scattered bruises  Drains/Devices: No PIV access  Patient Stated Goal for Today: unable to access,
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
## 2025-04-07 ENCOUNTER — DOCUMENTATION ONLY (OUTPATIENT)
Dept: OTHER | Facility: CLINIC | Age: 65
End: 2025-04-07
Payer: MEDICARE

## 2025-04-07 PROBLEM — F69 BEHAVIOR PROBLEM, ADULT: Status: ACTIVE | Noted: 2025-04-07

## 2025-04-07 PROCEDURE — 250N000013 HC RX MED GY IP 250 OP 250 PS 637: Performed by: HOSPITALIST

## 2025-04-07 PROCEDURE — 250N000013 HC RX MED GY IP 250 OP 250 PS 637: Performed by: INTERNAL MEDICINE

## 2025-04-07 PROCEDURE — 99232 SBSQ HOSP IP/OBS MODERATE 35: CPT | Performed by: INTERNAL MEDICINE

## 2025-04-07 PROCEDURE — 120N000001 HC R&B MED SURG/OB

## 2025-04-07 PROCEDURE — 250N000013 HC RX MED GY IP 250 OP 250 PS 637: Performed by: PHYSICIAN ASSISTANT

## 2025-04-07 PROCEDURE — G0378 HOSPITAL OBSERVATION PER HR: HCPCS

## 2025-04-07 PROCEDURE — 99223 1ST HOSP IP/OBS HIGH 75: CPT | Performed by: PHYSICIAN ASSISTANT

## 2025-04-07 RX ORDER — QUETIAPINE 300 MG/1
300 TABLET, FILM COATED, EXTENDED RELEASE ORAL AT BEDTIME
Status: DISCONTINUED | OUTPATIENT
Start: 2025-04-07 | End: 2025-04-09

## 2025-04-07 RX ORDER — DIVALPROEX SODIUM 500 MG/1
1000 TABLET, FILM COATED, EXTENDED RELEASE ORAL DAILY
Status: DISCONTINUED | OUTPATIENT
Start: 2025-04-07 | End: 2025-04-09

## 2025-04-07 RX ADMIN — CINACALCET 30 MG: 30 TABLET ORAL at 10:07

## 2025-04-07 RX ADMIN — AMLODIPINE BESYLATE 10 MG: 10 TABLET ORAL at 10:07

## 2025-04-07 RX ADMIN — CLONIDINE HYDROCHLORIDE 0.1 MG: 0.1 TABLET ORAL at 07:44

## 2025-04-07 RX ADMIN — QUETIAPINE 300 MG: 300 TABLET, FILM COATED, EXTENDED RELEASE ORAL at 23:37

## 2025-04-07 RX ADMIN — ATORVASTATIN CALCIUM 40 MG: 40 TABLET, FILM COATED ORAL at 23:36

## 2025-04-07 RX ADMIN — MELATONIN TAB 3 MG 3 MG: 3 TAB at 23:37

## 2025-04-07 RX ADMIN — FUROSEMIDE 40 MG: 40 TABLET ORAL at 10:07

## 2025-04-07 RX ADMIN — METOPROLOL SUCCINATE 50 MG: 50 TABLET, EXTENDED RELEASE ORAL at 10:07

## 2025-04-07 RX ADMIN — CLONIDINE HYDROCHLORIDE 0.1 MG: 0.1 TABLET ORAL at 23:37

## 2025-04-07 RX ADMIN — LISINOPRIL 40 MG: 40 TABLET ORAL at 10:07

## 2025-04-07 RX ADMIN — SENNOSIDES 1 TABLET: 8.6 TABLET ORAL at 10:07

## 2025-04-07 RX ADMIN — QUETIAPINE FUMARATE 25 MG: 25 TABLET ORAL at 06:04

## 2025-04-07 RX ADMIN — QUETIAPINE FUMARATE 50 MG: 50 TABLET, EXTENDED RELEASE ORAL at 07:44

## 2025-04-07 ASSESSMENT — ACTIVITIES OF DAILY LIVING (ADL)
ADLS_ACUITY_SCORE: 62
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
## 2025-04-07 NOTE — PROGRESS NOTES
Observation goals  PRIOR TO DISCHARGE        Comments: -diagnostic tests and consults completed and resulted-Met.   -vital signs normal or at patient baseline-Met.   -safe disposition plan has been identified-Not met.   Nurse to notify provider when observation goals have been met and patient is ready for discharge.
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
## 2025-04-07 NOTE — CONSULTS
Initial Psychiatric Consult   Consult date: April 7, 2025         Reason for Consult, requesting source:      Requesting source: Oleksandr Kong    Labs and imaging reviewed. Patient seen and evaluated by Eduardo Reza PA-C          HPI:   Admitted to hospital due to increased agitation at [facility].     Chart review reflects a recent 135 day psych hospital admission (10/12/24-2/24/25)  While there he was diagnosed with dementia. Behavior and paranoia was reasonably well controlled with zyprexa during that admission, and zyprexa was tapered down prior to discharge.     On arrival here zyprexa was restarted, but not felt to be effective for controlling agitation. Patient was switched to low dose seroquel.    On approach patient is confused and agitated. He is observed pushing eggs around his tray. Is not oriented to where he is or why. Is able to report frustration with people bothering him. Highly irritable. Asks writer if he is here to fight him. Displays difficulty word finding. Is upset that his eggs are cold, and his milk is warm. These are from breakfast tray. He is largely unable to answer questions coherently.  He is redirectable with effort. Seems to argue with whatever statement writer makes, but responds well to attempts to understand him.         Past Psychiatric History:           Substance Use and History:           Past Medical History:   PAST MEDICAL HISTORY:   Past Medical History:   Diagnosis Date    Kidney stone     Serum calcium elevated     Tobacco use disorder     tobacco quit line referral done 4/19/06       PAST SURGICAL HISTORY:   Past Surgical History:   Procedure Laterality Date    ANESTHESIA OUT OF OR MRI N/A 10/28/2024    Procedure: 1.5 MRI Brain;  Surgeon: GENERIC ANESTHESIA PROVIDER;  Location: [facility] OR    ROTATOR CUFF REPAIR RT/LT Right 2021             Family History:   FAMILY HISTORY:   Family History   Problem Relation Age of Onset    Hyperlipidemia Father     Prostate Cancer  Father          age 59 cancer prostate, liver    Hypertension Father     No Known Problems Sister     No Known Problems Sister     No Known Problems Sister     Cancer Maternal Grandmother          of cancer    Cerebrovascular Disease Maternal Grandfather     No Known Problems Daughter     No Known Problems Daughter     No Known Problems Daughter     Diabetes No family hx of     Coronary Artery Disease No family hx of     Breast Cancer No family hx of     Colon Cancer No family hx of        Family Psychiatric History:         Social History:   SOCIAL HISTORY:   Social History     Tobacco Use    Smoking status: Every Day     Current packs/day: 1.00     Average packs/day: 1 pack/day for 35.0 years (35.0 ttl pk-yrs)     Types: Cigarettes    Smokeless tobacco: Never   Substance Use Topics    Alcohol use: Yes     Alcohol/week: 0.0 standard drinks of alcohol                Physical ROS:   The 10 point Review of Systems is negative other than noted in the HPI or here.           Medications:     Current Facility-Administered Medications   Medication Dose Route Frequency Provider Last Rate Last Admin    amLODIPine (NORVASC) tablet 10 mg  10 mg Oral Daily Armando Kern MD   10 mg at 25 08    atorvastatin (LIPITOR) tablet 40 mg  40 mg Oral Daily Armando Kern MD   40 mg at 25 1933    cinacalcet (SENSIPAR) tablet 30 mg  30 mg Oral Daily Armando Kern MD   30 mg at 25 08    cloNIDine (CATAPRES) tablet 0.1 mg  0.1 mg Oral BID Armando Kern MD   0.1 mg at 25 0744    furosemide (LASIX) tablet 40 mg  40 mg Oral Daily Armando Kern MD   40 mg at 25 0858    lisinopril (ZESTRIL) tablet 40 mg  40 mg Oral Daily Armando Kern MD   40 mg at 25 0859    melatonin tablet 3 mg  3 mg Oral At Bedtime Oleksandr Kong MD   3 mg at 25 2130    metoprolol succinate ER (TOPROL XL) 24 hr tablet 50 mg  50 mg Oral Daily Armadno Kern MD   50 mg at 25 0859     [Held by provider] OLANZapine (zyPREXA) tablet 10 mg  10 mg Oral BID Armando Kern MD   10 mg at 04/06/25 0858    [Held by provider] OLANZapine (zyPREXA) tablet 15 mg  15 mg Oral At Bedtime Armando Kern MD        QUEtiapine (SEROquel XR) 24 hr tablet 50 mg  50 mg Oral TID Greer Romero MD   50 mg at 04/07/25 0744    sennosides (SENOKOT) tablet 1 tablet  1 tablet Oral Daily Armando Kern MD   1 tablet at 04/06/25 0858    sodium chloride (PF) 0.9% PF flush 3 mL  3 mL Intracatheter Q8H Oleksadnr Kong MD                  Allergies:   No Known Allergies       Labs:   No results found for this or any previous visit (from the past 48 hours).       Physical and Psychiatric Examination:     /61 (BP Location: Right arm)   Pulse 55   Temp 98  F (36.7  C) (Oral)   Resp 18   SpO2 93%   Weight is 0 lbs 0 oz  There is no height or weight on file to calculate BMI.    Physical Exam:  I have reviewed the physical exam as documented by by the medical team and agree with findings and assessment and have no additional findings to add at this time.    Mental Status Exam:    Appearance: awake, alert  Attitude:  uncooperative  Eye Contact:  poor   Mood:  angry  Affect:  intensity is exaggerated and intensity is heightened  Speech:  abnormal syntax  Language: Fluent in english   Psychomotor Behavior:  no evidence of tardive dyskinesia, dystonia, or tics  Thought Process:  disorganized  Associations:  no loose associations  Thought Content:  no evidence of psychotic thought  Insight:  limited  Judgement:  limited  Oriented to:  time, person, and place  Attention Span and Concentration:  limited  Recent and Remote Memory:  poor  Fund of Knowledge: Appropriate   Gait and Station:                DSM-5 Diagnosis:   Major neurocognitive disorder with agitation.           Assessment:   Patients agitation previously improved with zyprexa, but this was discontinued during last admission. Seroquel has lower  potency, so requires higher dosing for acute agitation. Max daily dose is 800mg. Currently at 150mg daily, and remaining quite agitated. Increase today to 300mg. Anticipate further dose increase will be necessary for behavioral control.     Will add depakote as well, as this is quite useful for agitation when antipsychotics alone are insufficient.           Summary of Recommendations:   Initiate depakote 1000mg BID, Schedule depakote level in 3 days   Increase seroquel to 300mg at bedtime      Eduardo Reza PA-C

## 2025-04-07 NOTE — PROGRESS NOTES
Observation goals  PRIOR TO DISCHARGE        Comments:   -diagnostic tests and consults completed and resulted: Not met, pending psych consult  -vital signs normal or at patient baseline: Not met   -safe disposition plan has been identified: Not met   Nurse to notify provider when observation goals have been met and patient is ready for discharge.

## 2025-04-07 NOTE — PROGRESS NOTES
Observation goals  PRIOR TO DISCHARGE        Comments:   -diagnostic tests and consults completed and resulted: Not met, pending psych consult  -vital signs normal or at patient baseline: Not met   -safe disposition plan has been identified: Not met   Nurse to notify provider when observation goals have been met and patient is ready for discharge

## 2025-04-07 NOTE — PLAN OF CARE
Goal Outcome Evaluation:  PRIMARY Concern: Admitted for aggressive behavior at [facility]. History of Alzheimer's dementia with agitation   SAFETY RISK Concerns (fall risk, behaviors, etc.): Falls  Aggression Tool Color: Red- some swearing and shouting at staff, wandering halls, went into patients room  Tests/Procedures for NEXT shift: AM labs  Consults? (Pending/following, signed-off?) Psych/ CM consult   Where is patient from? (Home, TCU, etc.): memory care  Other Important info for NEXT shift: 1:1 sitter at bedside. PRN seroquel given   Anticipated DC date & active delays: TBD. Pending placement      SUMMARY NOTE: Alert and oriented to self only. Sitter at bedside    Orientation/Cognitive: confused   Observation Goals (Met/ Not Met): Obs   Mobility Level/Assist Equipment:  SBA  Antibiotics & Plan (IV/po, length of tx l ft): N/A  Pain Management: denies pain  Complete Pain Reassessment: Y/N no Due next: next shift   Tele/VS/O2: VSS on RA.   ABNL Lab/BG: Am lab  Diet: regular  Bowel/Bladder: Cont/ incont @ times  Skin Concerns: scab at right shin  Drains/Devices: No PIV  Patient Stated Goal for Today: NARESH

## 2025-04-07 NOTE — PROGRESS NOTES
PRIMARY Concern: Admitted for aggressive behavior at memory care. History of Alzheimer's dementia with agitation   SAFETY RISK Concerns (fall risk, behaviors, etc.): Falls  Aggression Tool Color: Red- some swearing and shouting at staff, wandering halls and attempting to enter other patients' rooms.   Tests/Procedures for NEXT shift: AM labs  Consults? (Pending/following, signed-off?) Psych/ CM consult   Where is patient from? (Home, TCU, etc.): memory care  Other Important info for NEXT shift: 1:1 sitter at bedside. PRN seroquel given   Anticipated DC date & active delays: TBD. Pending placement      SUMMARY NOTE:   Patient taking short naps through the day, very impulsive when awake. Took AM medications, later decline depakote.    Orientation/Cognitive: confused, oriented self only.    Observation Goals (Met/ Not Met): Obs   Mobility Level/Assist Equipment:  SBA  Antibiotics & Plan (IV/po, length of tx l ft): N/A  Pain Management: denies pain at this time.   Complete Pain Reassessment: Y/N no Due next: next shift   Tele/VS/O2: VSS on RA.   ABNL Lab/BG: Am labs.   Diet: regular  Bowel/Bladder: Cont/ incont @ times  Skin Concerns: scab at right shin  Drains/Devices: No PIV  Patient Stated Goal for Today: NARESH

## 2025-04-08 PROCEDURE — 250N000013 HC RX MED GY IP 250 OP 250 PS 637: Performed by: INTERNAL MEDICINE

## 2025-04-08 PROCEDURE — 120N000001 HC R&B MED SURG/OB

## 2025-04-08 PROCEDURE — 99232 SBSQ HOSP IP/OBS MODERATE 35: CPT | Performed by: INTERNAL MEDICINE

## 2025-04-08 PROCEDURE — 250N000013 HC RX MED GY IP 250 OP 250 PS 637: Performed by: HOSPITALIST

## 2025-04-08 PROCEDURE — 250N000013 HC RX MED GY IP 250 OP 250 PS 637: Performed by: PHYSICIAN ASSISTANT

## 2025-04-08 RX ADMIN — SENNOSIDES 1 TABLET: 8.6 TABLET ORAL at 09:23

## 2025-04-08 RX ADMIN — LISINOPRIL 40 MG: 40 TABLET ORAL at 09:23

## 2025-04-08 RX ADMIN — CLONIDINE HYDROCHLORIDE 0.1 MG: 0.1 TABLET ORAL at 09:22

## 2025-04-08 RX ADMIN — MELATONIN TAB 3 MG 3 MG: 3 TAB at 21:10

## 2025-04-08 RX ADMIN — CINACALCET 30 MG: 30 TABLET ORAL at 09:23

## 2025-04-08 RX ADMIN — AMLODIPINE BESYLATE 10 MG: 10 TABLET ORAL at 09:23

## 2025-04-08 RX ADMIN — CLONIDINE HYDROCHLORIDE 0.1 MG: 0.1 TABLET ORAL at 21:10

## 2025-04-08 RX ADMIN — FUROSEMIDE 40 MG: 40 TABLET ORAL at 09:23

## 2025-04-08 RX ADMIN — ATORVASTATIN CALCIUM 40 MG: 40 TABLET, FILM COATED ORAL at 21:10

## 2025-04-08 RX ADMIN — DIVALPROEX SODIUM 1000 MG: 500 TABLET, FILM COATED, EXTENDED RELEASE ORAL at 09:23

## 2025-04-08 RX ADMIN — METOPROLOL SUCCINATE 50 MG: 50 TABLET, EXTENDED RELEASE ORAL at 09:22

## 2025-04-08 RX ADMIN — QUETIAPINE 300 MG: 300 TABLET, FILM COATED, EXTENDED RELEASE ORAL at 21:10

## 2025-04-08 ASSESSMENT — ACTIVITIES OF DAILY LIVING (ADL)
ADLS_ACUITY_SCORE: 62
ADLS_ACUITY_SCORE: 64
ADLS_ACUITY_SCORE: 63
ADLS_ACUITY_SCORE: 63
ADLS_ACUITY_SCORE: 64
ADLS_ACUITY_SCORE: 62
ADLS_ACUITY_SCORE: 63
ADLS_ACUITY_SCORE: 62
ADLS_ACUITY_SCORE: 64
ADLS_ACUITY_SCORE: 62
ADLS_ACUITY_SCORE: 62
ADLS_ACUITY_SCORE: 63
ADLS_ACUITY_SCORE: 64
ADLS_ACUITY_SCORE: 64
ADLS_ACUITY_SCORE: 62
ADLS_ACUITY_SCORE: 61
ADLS_ACUITY_SCORE: 61
ADLS_ACUITY_SCORE: 62
ADLS_ACUITY_SCORE: 62
ADLS_ACUITY_SCORE: 64

## 2025-04-08 NOTE — PROGRESS NOTES
Care Management Follow Up    Length of Stay (days): 1    Expected Discharge Date: 04/09/2025     Concerns to be Addressed: discharge planning     Patient plan of care discussed at interdisciplinary rounds: Yes    Anticipated Discharge Disposition:                Anticipated Discharge Services:    Anticipated Discharge DME:      Patient/family educated on Medicare website which has current facility and service quality ratings:    Education Provided on the Discharge Plan:    Patient/Family in Agreement with the Plan: yes    Referrals Placed by CM/SW:    Private pay costs discussed: Not applicable    Discussed  Partnership in Safe Discharge Planning  document with patient/family: No     Handoff Completed: No, handoff not indicated or clinically appropriate    Additional Information:  Writer attended morning charge report. Writer able to see that yesterday's 's note states that pt's  through [facility] is wanting an update today.  Writer spoke to Doctor Erlin to see when pt is medically stable for discharge. Doctor states he is deferring this to psychiatry as they are the ones that are managing pt's medication related to behaviors.  Writer sent message to Psychiatry provider Eduardo Reza and asked for update as to when pt may be medically stable for discharge . Writer awaiting response.    Addendum: Writer spoke with Psychiatry provider Libia who states that it would take pt a few days with the medication changes to show improvement and to have consistent evidence that pt's behavior has stabilized.     Writer placed phone call to Shelby Chowdary [facility] Adult  Pre-Petition Screening/Diversion [phone]. No answer. Writer left voicemail with an update and asking for call back.     Next Steps: to be determined.     CARO ChavesW  Social Work  [facility]

## 2025-04-08 NOTE — PROGRESS NOTES
[facility]    Hospitalist Progress Note    Brief Summary:  Estevan Aaron is a 64 year old male admitted on 4/4/2025.  Past history of HTN, HLD, hyperparathyroidism, dementia who presents from his memory care facility with aggressive behaviors.  Reportedly the facility has refused to take him back until seen by a provider and had medications adjusted.     Assessment & Plan    Alzheimer's dementia with agitation  *reportedly aggressive behaviors at [facility] care  - has been calm and cooperative with staff in the ED  Overnight as had events with the nursing staff when he tried to hit them.  *was hospitalized in inpatient Psych from Oct 2024 through 2/24/25; appears he was discharged on oral zyprexa  - BMP/CBC unremarkable, UA not sent.  - Started on seroquel 50 mg at bedtime and 12.5 mg bid prn  - Prior to admission patient was on Zyprexa 10 mg twice daily and 15 mg at bedtime.  Patient was eval by the psychiatry on 4/7/2025.    His Seroquel dose increased to 300 mg at nighttime, and started on Depakote as well.  That he is much more calmer and without any agitation at this time.  Zyprexa discontinued at this time.  Psych to follow-up, and adjust his medication as needed.  Once cleared by the psych, the patient will be able to discharge back to his facility.      HTN  - continue PTA amlodipine, clonidine, lasix, lisinopril, metoprolol   Blood pressure well-controlled with his PTA medications at this time.     HLD  - continue PTA atorvastatin      Hyperparathyroidism  - Continue Sensipar        Clinically Significant Risk Factors Present on Admission                   # Hypertension: Noted on problem list     # Dementia: noted on problem list           # Financial/Environmental Concerns: none         DVT Prophylaxis: Pneumatic Compression Devices  Code Status: Full Code      Medically Ready for Discharge: Anticipated in 2-4 Days patient remained medically stable, need to adjust his medication for  psych, once his behavior improved, remained, he can be discharged back to his facility.  Psych need to follow-up daily.    Armando Kern MD  [facility]  Contact information available via [facility] Paging/Directory        Interval History   Patient seen and evaluated in his room today, sleeping sitting in the chair, more calmer as compared to before.  Seen by psych, and his Seroquel dose increased    Did not wake him up this morning.    -Data reviewed today: I reviewed all new labs and imaging results over the last 24 hours. I personally reviewed no images or EKG's today.    Physical Exam   Temp: 97.7  F (36.5  C) Temp src: Oral BP: 126/85 Pulse: 74   Resp: 16 SpO2: 94 % O2 Device: None (Room air)    There were no vitals filed for this visit.  Vital Signs with Ranges  Temp:  [97.5  F (36.4  C)-98.1  F (36.7  C)] 97.7  F (36.5  C)  Pulse:  [57-74] 74  Resp:  [16-18] 16  BP: (115-127)/(66-85) 126/85  SpO2:  [94 %-95 %] 94 %  I/O last 3 completed shifts:  In: 290 [P.O.:290]  Out: -     Constitutional: Sleeping comfortably in the chair this morning.    Respiratory: No increased work of breathing, bilateral air entry equal  Cardiovascular: S1-S2 normal.  Skin/Integumen: No rash on exposed skin.    CNS: No focal deficit     Medications   Current Facility-Administered Medications   Medication Dose Route Frequency Provider Last Rate Last Admin     Current Facility-Administered Medications   Medication Dose Route Frequency Provider Last Rate Last Admin    amLODIPine (NORVASC) tablet 10 mg  10 mg Oral Daily Armando Kern MD   10 mg at 04/08/25 0923    atorvastatin (LIPITOR) tablet 40 mg  40 mg Oral Daily Armando Kern MD   40 mg at 04/07/25 2336    cinacalcet (SENSIPAR) tablet 30 mg  30 mg Oral Daily Armando Kern MD   30 mg at 04/08/25 0923    cloNIDine (CATAPRES) tablet 0.1 mg  0.1 mg Oral BID Armando Kern MD   0.1 mg at 04/08/25 0922    divalproex sodium extended-release  (DEPAKOTE ER) 24 hr tablet 1,000 mg  1,000 mg Oral Daily Eduardo Reza PA-C   1,000 mg at 04/08/25 0923    furosemide (LASIX) tablet 40 mg  40 mg Oral Daily Armando Kern MD   40 mg at 04/08/25 0923    lisinopril (ZESTRIL) tablet 40 mg  40 mg Oral Daily Armando Kern MD   40 mg at 04/08/25 0923    melatonin tablet 3 mg  3 mg Oral At Bedtime Oleksandr Kong MD   3 mg at 04/07/25 2337    metoprolol succinate ER (TOPROL XL) 24 hr tablet 50 mg  50 mg Oral Daily Armando Kern MD   50 mg at 04/08/25 0922    [Held by provider] OLANZapine (zyPREXA) tablet 10 mg  10 mg Oral BID Armando Kern MD   10 mg at 04/06/25 0858    [Held by provider] OLANZapine (zyPREXA) tablet 15 mg  15 mg Oral At Bedtime Armando Kern MD        QUEtiapine ER (SEROquel XR) 24 hr tablet 300 mg  300 mg Oral At Bedtime Eduardo Reza PA-C   300 mg at 04/07/25 2337    sennosides (SENOKOT) tablet 1 tablet  1 tablet Oral Daily Armando Kern MD   1 tablet at 04/08/25 0923    sodium chloride (PF) 0.9% PF flush 3 mL  3 mL Intracatheter Q8H Oleksandr Kong MD           Data   Recent Labs   Lab 04/04/25  2110   WBC 9.1   HGB 13.2*   MCV 63*         POTASSIUM 3.9   CHLORIDE 102   CO2 26   BUN 15.9   CR 0.89   ANIONGAP 11   UDAY 10.5*   *       No results found for this or any previous visit (from the past 24 hours).

## 2025-04-08 NOTE — PROGRESS NOTES
PRIMARY Concern: Admitted for aggressive behavior at memory care. History of Alzheimer's dementia with agitation   SAFETY RISK Concerns (fall risk, behaviors, etc.): Falls  Aggression Tool Color: Red- confused, delusional, out in marmolejo occasionally but no attempts to enter others' rooms.    Tests/Procedures for NEXT shift: AM labs  Consults? (Pending/following, signed-off?) Psych following.   Where is patient from? (Home, TCU, etc.): memory care  Other Important info for NEXT shift: 1:1 sitter at bedside.  Anticipated DC date & active delays: TBD. Pending placement      SUMMARY NOTE:   Sleeping off and on most of the day, no aggressive behaviors yet. Took all medications, attempts to wonder occasionally but easy to redirect.    Orientation/Cognitive: confused, oriented self only.    Observation Goals (Met/ Not Met): Obs   Mobility Level/Assist Equipment:  SBA  Antibiotics & Plan (IV/po, length of tx l ft): N/A  Pain Management: denies pain at this time.   Complete Pain Reassessment: Y/N no Due next: next shift   Tele/VS/O2: VSS on RA.   ABNL Lab/BG: Am labs.   Diet: regular  Bowel/Bladder: Cont/ incont @ times  Skin Concerns: scab at right shin  Drains/Devices: No PIV  Patient Stated Goal for Today: NARESH

## 2025-04-08 NOTE — PROGRESS NOTES
PRIMARY Concern: Admitted for aggressive behavior at [facility] care. History of Alzheimer's dementia with agitation   SAFETY RISK Concerns (fall risk, behaviors, etc.): Falls  Aggression Tool Color: Red- Hx of threatening, swearing and shouting at staff, wandering halls and attempting to enter other patients' rooms.   Tests/Procedures for NEXT shift: AM labs  Consults? (Pending/following, signed-off?) Psych/ CM consult   Where is patient from? (Home, TCU, etc.): memory care  Other Important info for NEXT shift: 1:1 sitter at bedside.  Anticipated DC date & active delays: TBD. Pending placement      SUMMARY NOTE:   Pt initially refused scheduled PM meds and PM vitals, but upon re approach pt allowed writer to obtain a set of vitals and took scheduled medications without issue. Pt then slept for about 4 hours and was up around 4am. Pt has been redirectable this shift with no adverse behaviors.   Orientation/Cognitive: confused, oriented self only.    Observation Goals (Met/ Not Met): Obs   Mobility Level/Assist Equipment: Independent   Antibiotics & Plan (IV/po, length of tx l ft): N/A  Pain Management: denies pain at this time.   Tele/VS/O2: VSS on RA.   ABNL Lab/BG: Am labs.   Diet: regular  Bowel/Bladder: Cont/ incont @ times  Skin Concerns: scab at right shin  Drains/Devices: No PIV  Patient Stated Goal for Today: NARESH

## 2025-04-09 PROCEDURE — 250N000013 HC RX MED GY IP 250 OP 250 PS 637: Performed by: INTERNAL MEDICINE

## 2025-04-09 PROCEDURE — 250N000013 HC RX MED GY IP 250 OP 250 PS 637: Performed by: HOSPITALIST

## 2025-04-09 PROCEDURE — 120N000001 HC R&B MED SURG/OB

## 2025-04-09 PROCEDURE — 250N000011 HC RX IP 250 OP 636: Mod: JW | Performed by: INTERNAL MEDICINE

## 2025-04-09 PROCEDURE — 99232 SBSQ HOSP IP/OBS MODERATE 35: CPT | Performed by: PHYSICIAN ASSISTANT

## 2025-04-09 PROCEDURE — 250N000013 HC RX MED GY IP 250 OP 250 PS 637: Performed by: PHYSICIAN ASSISTANT

## 2025-04-09 PROCEDURE — 99232 SBSQ HOSP IP/OBS MODERATE 35: CPT | Performed by: INTERNAL MEDICINE

## 2025-04-09 RX ORDER — DIVALPROEX SODIUM 500 MG/1
1000 TABLET, FILM COATED, EXTENDED RELEASE ORAL 2 TIMES DAILY
Status: DISCONTINUED | OUTPATIENT
Start: 2025-04-09 | End: 2025-04-18

## 2025-04-09 RX ADMIN — CLONIDINE HYDROCHLORIDE 0.1 MG: 0.1 TABLET ORAL at 20:24

## 2025-04-09 RX ADMIN — OLANZAPINE 5 MG: 10 INJECTION, POWDER, FOR SOLUTION INTRAMUSCULAR at 10:59

## 2025-04-09 RX ADMIN — DIVALPROEX SODIUM 1000 MG: 500 TABLET, FILM COATED, EXTENDED RELEASE ORAL at 20:24

## 2025-04-09 RX ADMIN — QUETIAPINE 400 MG: 300 TABLET, FILM COATED, EXTENDED RELEASE ORAL at 22:05

## 2025-04-09 RX ADMIN — CINACALCET 30 MG: 30 TABLET ORAL at 08:02

## 2025-04-09 RX ADMIN — POLYETHYLENE GLYCOL 3350 17 G: 17 POWDER, FOR SOLUTION ORAL at 08:02

## 2025-04-09 RX ADMIN — SENNOSIDES 1 TABLET: 8.6 TABLET ORAL at 08:02

## 2025-04-09 RX ADMIN — ATORVASTATIN CALCIUM 40 MG: 40 TABLET, FILM COATED ORAL at 20:24

## 2025-04-09 RX ADMIN — DIVALPROEX SODIUM 1000 MG: 500 TABLET, FILM COATED, EXTENDED RELEASE ORAL at 08:02

## 2025-04-09 ASSESSMENT — ACTIVITIES OF DAILY LIVING (ADL)
ADLS_ACUITY_SCORE: 63
ADLS_ACUITY_SCORE: 63
ADLS_ACUITY_SCORE: 64
ADLS_ACUITY_SCORE: 63
ADLS_ACUITY_SCORE: 64
ADLS_ACUITY_SCORE: 63
ADLS_ACUITY_SCORE: 64
ADLS_ACUITY_SCORE: 64
ADLS_ACUITY_SCORE: 63
ADLS_ACUITY_SCORE: 64
ADLS_ACUITY_SCORE: 63
ADLS_ACUITY_SCORE: 64
ADLS_ACUITY_SCORE: 63
ADLS_ACUITY_SCORE: 64
ADLS_ACUITY_SCORE: 63

## 2025-04-09 NOTE — PROGRESS NOTES
PRIMARY Concern: Admitted for aggressive behavior at memory care. History of Alzheimer's dementia with agitation   SAFETY RISK Concerns (fall risk, behaviors, etc.): Falls  Aggression Tool Color: Red  Tests/Procedures for NEXT shift: AM labs  Consults? (Pending/following, signed-off?) Psych following.   Where is patient from? (Home, TCU, etc.): memory care  Other Important info for NEXT shift: 1:1 sitter at bedside.  Anticipated DC date & active delays: TBD. Pending placement      SUMMARY NOTE:    Orientation/Cognitive: confused, oriented self only.    Observation Goals (Met/ Not Met): Inpatient  Mobility Level/Assist Equipment:  SBA  Antibiotics & Plan (IV/po, length of tx l ft): N/A  Pain Management: denies pain at this time.   Complete Pain Reassessment: Y/N no Due next: next shift   Tele/VS/O2: VSS on RA.   ABNL Lab/BG: Am labs.   Diet: regular  Bowel/Bladder: Cont/ incont @ times. No BM this shift.  Skin Concerns: scab at right shin  Drains/Devices: No PIV  Patient Stated Goal for Today: NARESH

## 2025-04-09 NOTE — PLAN OF CARE
Around 8pm, while walking the marmolejo, pt attempted to enter another pts room. Pts 1:1 staff intervened by standing in the doorway. Pt became upset and shoved staff. Writer saw and ran to situation. Pt quickly clamed and 1:1 staff was able to redirect pt back to his room. Writer then brought pts scheduled medications to pt, but pt refused medications at this time. Writer will re attempt at a later time.

## 2025-04-09 NOTE — CONSULTS
Psychiatry Consultation; Follow up              Reason for Consult, requesting source:      Requesting source: Oleksandr Kong    Labs and imaging reviewed,     Total time spent in chart review, patient interview and coordination of care;            Interim history:    On approach today patient remains confused and disoriented. He attends to greeting. Is not able to answer questions coherently. Remains irritable, but is more accepting of redirection today. Continues to display difficulty with comprehension, and is not oriented to his surroundings or situation.         Current Medications:     Current Facility-Administered Medications   Medication Dose Route Frequency Provider Last Rate Last Admin    amLODIPine (NORVASC) tablet 10 mg  10 mg Oral Daily Armando Kern MD   10 mg at 04/08/25 0923    atorvastatin (LIPITOR) tablet 40 mg  40 mg Oral Daily Armando Kern MD   40 mg at 04/08/25 2110    cinacalcet (SENSIPAR) tablet 30 mg  30 mg Oral Daily Armando Kern MD   30 mg at 04/09/25 0802    cloNIDine (CATAPRES) tablet 0.1 mg  0.1 mg Oral BID Armando Kern MD   0.1 mg at 04/08/25 2110    divalproex sodium extended-release (DEPAKOTE ER) 24 hr tablet 1,000 mg  1,000 mg Oral Daily Eduardo Reza PA-C   1,000 mg at 04/09/25 0802    furosemide (LASIX) tablet 40 mg  40 mg Oral Daily Armando Kern MD   40 mg at 04/08/25 0923    lisinopril (ZESTRIL) tablet 40 mg  40 mg Oral Daily Armando Kern MD   40 mg at 04/08/25 0923    melatonin tablet 3 mg  3 mg Oral At Bedtime Oleksandr Kong MD   3 mg at 04/08/25 2110    metoprolol succinate ER (TOPROL XL) 24 hr tablet 50 mg  50 mg Oral Daily Armando Kern MD   50 mg at 04/08/25 0922    [Held by provider] OLANZapine (zyPREXA) tablet 10 mg  10 mg Oral BID Armando Kern MD   10 mg at 04/06/25 0858    [Held by provider] OLANZapine (zyPREXA) tablet 15 mg  15 mg Oral At Bedtime Armando Kern MD        QUEtiapine ER (SEROquel XR) 24 hr  "tablet 300 mg  300 mg Oral At Bedtime Eduardo Reza PA-C   300 mg at 04/08/25 2110    sennosides (SENOKOT) tablet 1 tablet  1 tablet Oral Daily Armando Kern MD   1 tablet at 04/09/25 0802    sodium chloride (PF) 0.9% PF flush 3 mL  3 mL Intracatheter Q8H Oleksandr Kong MD                  MSE:   Appearance: awake, alert  Attitude:  somewhat cooperative  Eye Contact:  poor   Mood:   restless  Affect:  labile  Speech:  paucity of speech  Psychomotor Behavior:  physical agitation  Muscle strength and tone: intact   Thought Process:  disorganized  Associations:  no loose associations  Thought Content:  no evidence of suicidal ideation or homicidal ideation  Insight:  limited  Judgement:  limited  Oriented to:   person  Attention Span and Concentration:  poor  Recent and Remote Memory:  poor    Vital signs:  Temp: 97.4  F (36.3  C) Temp src: Axillary BP: 111/63 Pulse: 61   Resp: 18 SpO2: 94 % O2 Device: None (Room air)        Estimated body mass index is 40.9 kg/m  as calculated from the following:    Height as of 10/10/24: 1.676 m (5' 6\").    Weight as of 2/23/25: 114.9 kg (253 lb 6.4 oz).    Qtc:          DSM-5 Diagnosis:   Major neurocognitive disorder with agitation          Assessment:   Patient is improving overall. Behaviors remain present. Seems to be mostly driven by restlessness and lack of orientation to surroundings. Patient is able to accept some redirection, but largely remains confused.     Seems to be his baseline cognitively. Will continue to target behavior by titration medications upwards.     Most appropriate disposition would be a return to the memory care placement. Behaviors are likely to persist to some degree, so managing expectations will be important.     Given his last admission was extremely prolonged it would be preferable for patient to return to his current living environment as soon as possible.             Summary of Recommendations:   Increase depakote to 1000mg BID  Increase " "seroquel to 400mg QHS    \"Much or all of the text in this note was generated through the use of Dragon Dictate voice to text software. Errors in spelling or words which appear to be out of contact are unintentional, may be present due having escaped editing\"           "

## 2025-04-09 NOTE — PROGRESS NOTES
[facility]    Hospitalist Progress Note    Brief Summary:  Estevan Aaron is a 64 year old male admitted on 4/4/2025.  Past history of HTN, HLD, hyperparathyroidism, dementia who presents from his memory care facility with aggressive behaviors.  Reportedly the facility has refused to take him back until seen by a provider and had medications adjusted.     Assessment & Plan      Alzheimer's dementia with agitation  *reportedly aggressive behaviors at [facility] care  - has been calm and cooperative with staff in the ED  Overnight as had events with the nursing staff when he tried to hit them.  *was hospitalized in inpatient Psych from Oct 2024 through 2/24/25; appears he was discharged on oral zyprexa  - BMP/CBC unremarkable, UA not sent.  - Started on seroquel 50 mg at bedtime and 12.5 mg bid prn  - Prior to admission patient was on Zyprexa 10 mg twice daily and 15 mg at bedtime.  Patient was eval by the psychiatry on 4/7/2025.    His Seroquel dose increased to 300 mg at nighttime, and started on Depakote as well.  Movement, without agitation at this time.  Will appreciate psych input.  Zyprexa discontinued at this time.  Psych to follow-up, and adjust his medication as needed.  Once cleared by the psych, the patient will be able to discharge back to his facility.      HTN  - continue PTA amlodipine, clonidine, lasix, lisinopril, metoprolol   Blood pressure well-controlled with his PTA medications at this time.     HLD  - continue PTA atorvastatin      Hyperparathyroidism  - Continue Sensipar        Clinically Significant Risk Factors                   # Hypertension: Noted on problem list     # Dementia: noted on problem list            # Financial/Environmental Concerns: none         DVT Prophylaxis: Pneumatic Compression Devices  Code Status: Full Code      Medically Ready for Discharge: Anticipated in 2-4 Days patient remained medically stable, need clearance from psychiatry for  discharge.    Armando Kern MD  [facility]  Contact information available via [facility] Paging/Directory        Interval History   Patient was up this morning, had his breakfast, at my visit was sitting in a chair and was taking a nap.  Told me feeling sleepy and wanted to sleep at this time.  No significant agitation overnight.    No other significant event overnight    -Data reviewed today: I reviewed all new labs and imaging results over the last 24 hours. I personally reviewed no images or EKG's today.    Physical Exam   Temp: 97.4  F (36.3  C) Temp src: Axillary BP: 111/63 Pulse: 61   Resp: 18 SpO2: 94 % O2 Device: None (Room air)    There were no vitals filed for this visit.  Vital Signs with Ranges  Temp:  [97.4  F (36.3  C)-98.2  F (36.8  C)] 97.4  F (36.3  C)  Pulse:  [55-63] 61  Resp:  [16-18] 18  BP: (111-120)/(63-98) 111/63  SpO2:  [93 %-98 %] 94 %  I/O last 3 completed shifts:  In: 160 [P.O.:160]  Out: -     Constitutional: Sleeping comfortably in the chair this morning.    Respiratory: No increased work of breathing, bilateral air entry equal  Cardiovascular: S1-S2 normal.  Skin/Integumen: No rash on exposed skin.    CNS: No focal deficit     Medications   Current Facility-Administered Medications   Medication Dose Route Frequency Provider Last Rate Last Admin     Current Facility-Administered Medications   Medication Dose Route Frequency Provider Last Rate Last Admin    amLODIPine (NORVASC) tablet 10 mg  10 mg Oral Daily Armando Kern MD   10 mg at 04/08/25 0923    atorvastatin (LIPITOR) tablet 40 mg  40 mg Oral Daily Armando Kern MD   40 mg at 04/08/25 2110    cinacalcet (SENSIPAR) tablet 30 mg  30 mg Oral Daily Armando Kern MD   30 mg at 04/09/25 0802    cloNIDine (CATAPRES) tablet 0.1 mg  0.1 mg Oral BID Armando Kern MD   0.1 mg at 04/08/25 2110    divalproex sodium extended-release (DEPAKOTE ER) 24 hr tablet 1,000 mg  1,000 mg Oral Daily Libia,  LARY Clark   1,000 mg at 04/09/25 0802    furosemide (LASIX) tablet 40 mg  40 mg Oral Daily Armando Kern MD   40 mg at 04/08/25 0923    lisinopril (ZESTRIL) tablet 40 mg  40 mg Oral Daily Armando Kern MD   40 mg at 04/08/25 0923    melatonin tablet 3 mg  3 mg Oral At Bedtime Oleksandr Kong MD   3 mg at 04/08/25 2110    metoprolol succinate ER (TOPROL XL) 24 hr tablet 50 mg  50 mg Oral Daily Armando Kern MD   50 mg at 04/08/25 0922    [Held by provider] OLANZapine (zyPREXA) tablet 10 mg  10 mg Oral BID Armando Kern MD   10 mg at 04/06/25 0858    [Held by provider] OLANZapine (zyPREXA) tablet 15 mg  15 mg Oral At Bedtime Armando Kern MD        QUEtiapine ER (SEROquel XR) 24 hr tablet 300 mg  300 mg Oral At Bedtime Eduardo Reza PA-C   300 mg at 04/08/25 2110    sennosides (SENOKOT) tablet 1 tablet  1 tablet Oral Daily Armando Kern MD   1 tablet at 04/09/25 0802    sodium chloride (PF) 0.9% PF flush 3 mL  3 mL Intracatheter Q8H Oelksandr Kong MD           Data   Recent Labs   Lab 04/04/25 2110   WBC 9.1   HGB 13.2*   MCV 63*         POTASSIUM 3.9   CHLORIDE 102   CO2 26   BUN 15.9   CR 0.89   ANIONGAP 11   UDAY 10.5*   *       No results found for this or any previous visit (from the past 24 hours).

## 2025-04-09 NOTE — SIGNIFICANT EVENT
Significant Event Note    Time of event: 11:06 AM April 9, 2025    Description of event:  Code 21    Assessment and Plan:    Dementia with behavioral disturbance  Code 21 was called for agitation, and patient was swinging at staff.  Upon arrival patient is sitting at the edge of the bed.  Nursing staff report they have administered 5 mg IM Zyprexa prior to house NP arrival.  Patient appears to be calm and redirectable at this time.  No further assistance needed from house team.    Discussed with and defer further cares to primary hospitalist Dr. Kern.    David Meredith, KAPIL    No charge

## 2025-04-09 NOTE — PROGRESS NOTES
Care Management Follow Up    Length of Stay (days): 2    Expected Discharge Date: 04/10/2025     Concerns to be Addressed: discharge planning     Patient plan of care discussed at interdisciplinary rounds: Yes    Anticipated Discharge Disposition:       Anticipated Discharge Services:    Anticipated Discharge DME:      Patient/family educated on Medicare website which has current facility and service quality ratings:    Education Provided on the Discharge Plan:    Patient/Family in Agreement with the Plan: yes    Referrals Placed by CM/SW:    Private pay costs discussed: Not applicable    Discussed  Partnership in Safe Discharge Planning  document with patient/family: No     Handoff Completed: No, handoff not indicated or clinically appropriate    Additional Information:  Received e-mail from [facility] WAYNE Ryan. She submitted a report asking for pt to be scheduled for a review hearing for a 6 month extension of the MI Commitment and [place]. These  on [address] on  or , and I should hear a couple days in advance (for Monday hearing, on Thursday or Friday and for Wednesday hearing, Monday or Tuesday).  I ll try to give a heads up, but the court (probate) will reach out to the unit to schedule a time, so that the hospital is able to provide support with at least trying to offer Santos the opportunity to be included. I understand this might not be possible, if he is agitated, and if this is the case, we will likely still call to have staff report this on the record with the , so they can document why he wasn t present.    Shelby also mentioned that Vera, director of [facility], e-mailed her stating they cannot accept patient back without an active POA or guardian since they require this for all residents. Responded to Shelby explaining that legally that is patients home, so if he is deemed medically stable to return to the [facility], they would need to accept back, and they have not given a 30  day eviction notice. Stated they have been allowing pt to live there before this hospitalization without an active guardian so there is no reason they can't accept back from this hospital stay. Explained that patients cannot stay in the hospital without a medical need.   Shelby responded stating pt did have an emergency guardianship when they [facility] into their [facility]iary but then it , so this is probably why Vera is stating this.   Responded to Shelby again and explained that we do not know how long pt will need to be admitted, but once deemed medically stable and if the recommendation is to return to penitentiary, we will discuss this with Vera, and if pt is refused back, we will contact the [facility].     Per previous notes, penitentiary would need to come to hospital to assess patient prior to return.    ZOFIA Miguel  Social Work  [facility]     (4) rarely moist

## 2025-04-10 VITALS
DIASTOLIC BLOOD PRESSURE: 67 MMHG | OXYGEN SATURATION: 94 % | TEMPERATURE: 97.4 F | SYSTOLIC BLOOD PRESSURE: 127 MMHG | HEART RATE: 84 BPM | RESPIRATION RATE: 18 BRPM

## 2025-04-10 LAB — VALPROATE SERPL-MCNC: 53.7 UG/ML

## 2025-04-10 PROCEDURE — 36415 COLL VENOUS BLD VENIPUNCTURE: CPT | Performed by: PHYSICIAN ASSISTANT

## 2025-04-10 PROCEDURE — 120N000001 HC R&B MED SURG/OB

## 2025-04-10 PROCEDURE — 250N000013 HC RX MED GY IP 250 OP 250 PS 637: Performed by: PHYSICIAN ASSISTANT

## 2025-04-10 PROCEDURE — 80164 ASSAY DIPROPYLACETIC ACD TOT: CPT | Performed by: PHYSICIAN ASSISTANT

## 2025-04-10 PROCEDURE — 250N000013 HC RX MED GY IP 250 OP 250 PS 637: Performed by: INTERNAL MEDICINE

## 2025-04-10 PROCEDURE — 99232 SBSQ HOSP IP/OBS MODERATE 35: CPT | Performed by: INTERNAL MEDICINE

## 2025-04-10 PROCEDURE — 250N000013 HC RX MED GY IP 250 OP 250 PS 637: Performed by: HOSPITALIST

## 2025-04-10 RX ADMIN — DIVALPROEX SODIUM 1000 MG: 500 TABLET, FILM COATED, EXTENDED RELEASE ORAL at 21:02

## 2025-04-10 RX ADMIN — CLONIDINE HYDROCHLORIDE 0.1 MG: 0.1 TABLET ORAL at 21:03

## 2025-04-10 RX ADMIN — QUETIAPINE FUMARATE 25 MG: 25 TABLET ORAL at 18:38

## 2025-04-10 RX ADMIN — ATORVASTATIN CALCIUM 40 MG: 40 TABLET, FILM COATED ORAL at 10:52

## 2025-04-10 RX ADMIN — LISINOPRIL 40 MG: 40 TABLET ORAL at 10:51

## 2025-04-10 RX ADMIN — DIVALPROEX SODIUM 1000 MG: 500 TABLET, FILM COATED, EXTENDED RELEASE ORAL at 10:34

## 2025-04-10 RX ADMIN — QUETIAPINE 400 MG: 300 TABLET, FILM COATED, EXTENDED RELEASE ORAL at 21:03

## 2025-04-10 RX ADMIN — MELATONIN TAB 3 MG 3 MG: 3 TAB at 21:03

## 2025-04-10 RX ADMIN — ATORVASTATIN CALCIUM 40 MG: 40 TABLET, FILM COATED ORAL at 21:02

## 2025-04-10 RX ADMIN — FUROSEMIDE 40 MG: 40 TABLET ORAL at 10:52

## 2025-04-10 RX ADMIN — CLONIDINE HYDROCHLORIDE 0.1 MG: 0.1 TABLET ORAL at 10:52

## 2025-04-10 RX ADMIN — AMLODIPINE BESYLATE 10 MG: 10 TABLET ORAL at 10:52

## 2025-04-10 RX ADMIN — MELATONIN TAB 3 MG 3 MG: 3 TAB at 01:41

## 2025-04-10 RX ADMIN — METOPROLOL SUCCINATE 50 MG: 50 TABLET, EXTENDED RELEASE ORAL at 10:51

## 2025-04-10 RX ADMIN — POLYETHYLENE GLYCOL 3350 17 G: 17 POWDER, FOR SOLUTION ORAL at 09:06

## 2025-04-10 RX ADMIN — CINACALCET 30 MG: 30 TABLET ORAL at 10:52

## 2025-04-10 ASSESSMENT — ACTIVITIES OF DAILY LIVING (ADL)
ADLS_ACUITY_SCORE: 61
ADLS_ACUITY_SCORE: 50
ADLS_ACUITY_SCORE: 62
ADLS_ACUITY_SCORE: 62
ADLS_ACUITY_SCORE: 61
ADLS_ACUITY_SCORE: 61
ADLS_ACUITY_SCORE: 55
ADLS_ACUITY_SCORE: 62
ADLS_ACUITY_SCORE: 62
ADLS_ACUITY_SCORE: 50
ADLS_ACUITY_SCORE: 62
ADLS_ACUITY_SCORE: 55
ADLS_ACUITY_SCORE: 62
ADLS_ACUITY_SCORE: 55
ADLS_ACUITY_SCORE: 55
ADLS_ACUITY_SCORE: 62
ADLS_ACUITY_SCORE: 50
ADLS_ACUITY_SCORE: 62

## 2025-04-10 NOTE — PROGRESS NOTES
PRIMARY Concern: Admitted for aggressive behavior at [facility]. History of Alzheimer's dementia with agitation   SAFETY RISK Concerns (fall risk, behaviors, etc.): Falls  Aggression Tool Color: Red  Tests/Procedures for NEXT shift: AM labs  Consults? (Pending/following, signed-off?) Psych following, increased seroquel and depakote.  Where is patient from? (Home, TCU, etc.): memory care  Other Important info for NEXT shift: 1:1 sitter at bedside. Multiple code 21s called during pts admission.  Anticipated DC date & active delays: TBD. Pending placement      SUMMARY NOTE: No adverse events overnight. Pt wandered the halls once around 2am then slept until 5am. Pt walked around the unit with no issue or behaviors to note. Pt currently back in room resting in chair.     Orientation/Cognitive: confused, oriented self only.    Observation Goals (Met/ Not Met): Inpatient  Mobility Level/Assist Equipment:  SBA  Antibiotics & Plan (IV/po, length of tx l ft): N/A  Pain Management: denies pain at this time.   Complete Pain Reassessment: Y/N no Due next: next shift   Tele/VS/O2: VSS on RA.   ABNL Lab/BG: Am labs.   Diet: regular  Bowel/Bladder: Cont/ incont @ times. No BM this shift.  Skin Concerns: scab at right shin  Drains/Devices: No PIV  Patient Stated Goal for Today: NARESH

## 2025-04-10 NOTE — PROGRESS NOTES
[facility]    Hospitalist Progress Note    Brief Summary:  Estevan Aaron is a 64 year old male admitted on 4/4/2025.  Past history of HTN, HLD, hyperparathyroidism, dementia who presents from his memory care facility with aggressive behaviors.  Reportedly the facility has refused to take him back until seen by a provider and had medications adjusted.     Assessment & Plan      Alzheimer's dementia with agitation  *reportedly aggressive behaviors at [facility] care  - has been calm and cooperative with staff in the ED  Overnight as had events with the nursing staff when he tried to hit them.  *was hospitalized in inpatient Psych from Oct 2024 through 2/24/25; appears he was discharged on oral zyprexa  - BMP/CBC unremarkable, UA not sent.  - Started on seroquel 50 mg at bedtime and 12.5 mg bid prn  - Prior to admission patient was on Zyprexa 10 mg twice daily and 15 mg at bedtime.  Patient was eval by the psychiatry on 4/7/2025.    His Seroquel dose increased to 300 mg at nighttime, and started on Depakote as well.  Zyprexa discontinued at this time.  Psych is following, his Seroquel dose further increased to 400 mg, and Depakote increased to 1000 mg twice daily  Overall stable at this time.  Psych is following and cleared him for discharge.   to work on discharging, he is medically stable at this time.  Cleared by the psych      HTN  - continue PTA amlodipine, clonidine, lasix, lisinopril, metoprolol   Blood pressure well-controlled with his PTA medications at this time.     HLD  - continue PTA atorvastatin      Hyperparathyroidism  - Continue Sensipar        Clinically Significant Risk Factors                   # Hypertension: Noted on problem list     # Dementia: noted on problem list            # Financial/Environmental Concerns: none         DVT Prophylaxis: Pneumatic Compression Devices  Code Status: Full Code      Medically Ready for Discharge: Anticipated Tomorrow patient  medically stable to be discharged, 5 Schedule him for discharge as well.    Armando Kern MD  [facility]  Contact information available via [facility] Paging/Directory        Interval History   Patient seen and evaluated this morning, awake and alert having his breakfast, earlier refused his medication but then took it later.  Psych is following and adjusting his medication.    No other significant event overnight    -Data reviewed today: I reviewed all new labs and imaging results over the last 24 hours. I personally reviewed no images or EKG's today.    Physical Exam       BP: 127/67 Pulse: 84   Resp: 18 SpO2: 94 % O2 Device: None (Room air)    There were no vitals filed for this visit.  Vital Signs with Ranges  Pulse:  [84-85] 84  Resp:  [18] 18  BP: (127-136)/(67-97) 127/67  SpO2:  [94 %-95 %] 94 %  I/O last 3 completed shifts:  In: 280 [P.O.:280]  Out: -     Constitutional: Awake and alert, in no acute distress  Respiratory: No increased work of breathing, bilateral air entry equal  Cardiovascular: S1-S2 normal.  Skin/Integumen: No rash on exposed skin.    CNS: No focal deficit     Medications   Current Facility-Administered Medications   Medication Dose Route Frequency Provider Last Rate Last Admin     Current Facility-Administered Medications   Medication Dose Route Frequency Provider Last Rate Last Admin    amLODIPine (NORVASC) tablet 10 mg  10 mg Oral Daily Armando Kern MD   10 mg at 04/10/25 1052    atorvastatin (LIPITOR) tablet 40 mg  40 mg Oral Daily Armando Kern MD   40 mg at 04/10/25 1052    cinacalcet (SENSIPAR) tablet 30 mg  30 mg Oral Daily Armando Kern MD   30 mg at 04/10/25 1052    cloNIDine (CATAPRES) tablet 0.1 mg  0.1 mg Oral BID Armando Kern MD   0.1 mg at 04/10/25 1052    divalproex sodium extended-release (DEPAKOTE ER) 24 hr tablet 1,000 mg  1,000 mg Oral BID Eduardo Reza PA-C   1,000 mg at 04/10/25 1034    furosemide (LASIX) tablet 40 mg  40  mg Oral Daily Armando Kern MD   40 mg at 04/10/25 1052    lisinopril (ZESTRIL) tablet 40 mg  40 mg Oral Daily Armando Kern MD   40 mg at 04/10/25 1051    melatonin tablet 3 mg  3 mg Oral At Bedtime Oleksandr Kong MD   3 mg at 04/10/25 0141    metoprolol succinate ER (TOPROL XL) 24 hr tablet 50 mg  50 mg Oral Daily Armando Kern MD   50 mg at 04/10/25 1051    [Held by provider] OLANZapine (zyPREXA) tablet 10 mg  10 mg Oral BID Armando Kern MD   10 mg at 04/06/25 0858    [Held by provider] OLANZapine (zyPREXA) tablet 15 mg  15 mg Oral At Bedtime Armando Kern MD        QUEtiapine (SEROquel XR) 24 hr tablet 400 mg  400 mg Oral At Bedtime Eduardo Reza PA-C   400 mg at 04/09/25 2205    sennosides (SENOKOT) tablet 1 tablet  1 tablet Oral Daily Armando Kern MD   1 tablet at 04/09/25 0802    sodium chloride (PF) 0.9% PF flush 3 mL  3 mL Intracatheter Q8H Oleksandr Kong MD           Data   Recent Labs   Lab 04/04/25  2110   WBC 9.1   HGB 13.2*   MCV 63*         POTASSIUM 3.9   CHLORIDE 102   CO2 26   BUN 15.9   CR 0.89   ANIONGAP 11   UDAY 10.5*   *       No results found for this or any previous visit (from the past 24 hours).

## 2025-04-10 NOTE — PROGRESS NOTES
PRIMARY Concern: Admitted for aggressive behavior at memory care. History of Alzheimer's dementia with agitation   SAFETY RISK Concerns (fall risk, behaviors, etc.): Falls  Ag gression Tool Color: Red. At 1835 patient grabbed and shook the sitter in the room; situation deescalated with a code green.  Tests/Procedures for NEXT shift: AM labs  Consults? (Pending/following, signed-off?) Psych following but did clear patient for discharge, increased seroquel and depakote.  Where is patient from? (Home, TCU, etc.): memory care  Other Important info for NEXT shift: 1:1 sitter at bedside. No code 21s required this shift. Patient did take medications better today with apple sauce. Depakote is too large for the patient to swallow with just water   Anticipated DC date & active delays: TBD. Pending placement, psych did recommend patient go back to his previous facility     SUMMARY NOTE:   Orientation/Cognitive: confused, oriented self only.    Observation Goals (Met/ Not Met): Inpatient  Mobility Level/Assist Equipment:  SBA  Antibiotics & Plan (IV/po, length of tx l ft): N/A  Pain Management: denies pain at this time.   Complete Pain Reassessment: Y/N no Due next: next shift   Tele/VS/O2: VSS on RA.   ABNL Lab/BG: Valproic acid level is 53.7  Diet: regular  Bowel/Bladder: Cont/ incont @ times. No BM this shift.  Skin Concerns: scab at right shin  Drains/Devices: No PIV  Patient Stated Goal for Today: NARESH

## 2025-04-10 NOTE — PROGRESS NOTES
PRIMARY Concern: Admitted for aggressive behavior at [facility]. History of Alzheimer's dementia with agitation   SAFETY RISK Concerns (fall risk, behaviors, etc.): Falls  Aggression Tool Color: Red  Tests/Procedures for NEXT shift: AM labs  Consults? (Pending/following, signed-off?) Psych following, increased seroquel and depakote.  Where is patient from? (Home, TCU, etc.): memory care  Other Important info for NEXT shift: 1:1 sitter at bedside. Code 21 called due to aggressive behavior.  Anticipated DC date & active delays: TBD. Pending placement      SUMMARY NOTE:    Orientation/Cognitive: confused, oriented self only.    Observation Goals (Met/ Not Met): Inpatient  Mobility Level/Assist Equipment:  SBA  Antibiotics & Plan (IV/po, length of tx l ft): N/A  Pain Management: denies pain at this time.   Complete Pain Reassessment: Y/N no Due next: next shift   Tele/VS/O2: VSS on RA.   ABNL Lab/BG: Am labs.   Diet: regular  Bowel/Bladder: Cont/ incont @ times. No BM this shift.  Skin Concerns: scab at right shin  Drains/Devices: No PIV  Patient Stated Goal for Today: NARESH

## 2025-04-11 PROCEDURE — 99232 SBSQ HOSP IP/OBS MODERATE 35: CPT | Performed by: INTERNAL MEDICINE

## 2025-04-11 PROCEDURE — 250N000013 HC RX MED GY IP 250 OP 250 PS 637: Performed by: HOSPITALIST

## 2025-04-11 PROCEDURE — 250N000013 HC RX MED GY IP 250 OP 250 PS 637: Performed by: INTERNAL MEDICINE

## 2025-04-11 PROCEDURE — 120N000001 HC R&B MED SURG/OB

## 2025-04-11 PROCEDURE — 250N000013 HC RX MED GY IP 250 OP 250 PS 637: Performed by: PHYSICIAN ASSISTANT

## 2025-04-11 RX ADMIN — QUETIAPINE FUMARATE 25 MG: 25 TABLET ORAL at 16:09

## 2025-04-11 RX ADMIN — CLONIDINE HYDROCHLORIDE 0.1 MG: 0.1 TABLET ORAL at 21:28

## 2025-04-11 RX ADMIN — MELATONIN TAB 3 MG 3 MG: 3 TAB at 21:27

## 2025-04-11 RX ADMIN — DIVALPROEX SODIUM 1000 MG: 500 TABLET, FILM COATED, EXTENDED RELEASE ORAL at 21:27

## 2025-04-11 RX ADMIN — ATORVASTATIN CALCIUM 40 MG: 40 TABLET, FILM COATED ORAL at 21:27

## 2025-04-11 RX ADMIN — QUETIAPINE 400 MG: 300 TABLET, FILM COATED, EXTENDED RELEASE ORAL at 21:27

## 2025-04-11 ASSESSMENT — ACTIVITIES OF DAILY LIVING (ADL)
ADLS_ACUITY_SCORE: 55
ADLS_ACUITY_SCORE: 50
ADLS_ACUITY_SCORE: 55
ADLS_ACUITY_SCORE: 50
ADLS_ACUITY_SCORE: 55
ADLS_ACUITY_SCORE: 50
ADLS_ACUITY_SCORE: 55
ADLS_ACUITY_SCORE: 55
ADLS_ACUITY_SCORE: 50
ADLS_ACUITY_SCORE: 55
ADLS_ACUITY_SCORE: 50
ADLS_ACUITY_SCORE: 50
ADLS_ACUITY_SCORE: 55
ADLS_ACUITY_SCORE: 50
ADLS_ACUITY_SCORE: 50

## 2025-04-11 NOTE — PLAN OF CARE
PRIMARY Concern: Admitted for aggressive behavior at memory care. History of Alzheimer's dementia with agitation   SAFETY RISK Concerns (fall risk, behaviors, etc.): Falls  Ag gression Tool Color: Red. patient grabbed and shook the sitter in the room; situation deescalated. No Code called .  Tests/Procedures for NEXT shift: AM labs  Consults? (Pending/following, signed-off?) Psych following but did clear patient for discharge, increased seroquel and depakote.  Where is patient from? (Home, TCU, etc.): memory care  Other Important info for NEXT shift: 1:1 sitter at bedside. No code 21s required this shift. Patient did take medications. Depakote is too large for the patient to swallow with just water   Anticipated DC date & active delays: TBD. Pending placement, psych did recommend patient go back to his previous facility     SUMMARY NOTE:   Orientation/Cognitive: confused, oriented self only.    Observation Goals (Met/ Not Met): Inpatient  Mobility Level/Assist Equipment:  SBA  Antibiotics & Plan (IV/po, length of tx l ft): N/A  Pain Management: denies pain at this time.   Complete Pain Reassessment: Y/N no Due next: next shift   Tele/VS/O2: VSS on RA.   ABNL Lab/BG: See chart  Diet: regular  Bowel/Bladder: Cont/ incont @ times. No BM this shift.  Skin Concerns: scab at right shin  Drains/Devices: No PIV  Patient Stated Goal for Today: NARESH

## 2025-04-11 NOTE — PROGRESS NOTES
[facility]    Hospitalist Progress Note    Brief Summary:  Estevan Aaron is a 64 year old male admitted on 4/4/2025.  Past history of HTN, HLD, hyperparathyroidism, dementia who presents from his memory care facility with aggressive behaviors.  Reportedly the facility has refused to take him back until seen by a provider and had medications adjusted.     Assessment & Plan      Alzheimer's dementia with agitation  *reportedly aggressive behaviors at [facility] care  - has been calm and cooperative with staff in the ED  Overnight as had events with the nursing staff when he tried to hit them.  *was hospitalized in inpatient Psych from Oct 2024 through 2/24/25; appears he was discharged on oral zyprexa  - BMP/CBC unremarkable, UA not sent.  - Started on seroquel 50 mg at bedtime and 12.5 mg bid prn  - Prior to admission patient was on Zyprexa 10 mg twice daily and 15 mg at bedtime.  Patient was eval by the psychiatry on 4/7/2025.    His Seroquel dose increased to 300 mg at nighttime, and started on Depakote as well.  Zyprexa discontinued at this time.  Psych is following, his Seroquel dose further increased to 400 mg, and Depakote increased to 1000 mg twice daily  Overall stable at this time.  Psych is following and cleared him for discharge.   to work on discharging, he is medically stable at this time.  Cleared by the psych  Awaiting disposition to his facility at this time.      HTN  - continue PTA amlodipine, clonidine, lasix, lisinopril, metoprolol   Blood pressure well-controlled with his PTA medications at this time.     HLD  - continue PTA atorvastatin      Hyperparathyroidism  - Continue Sensipar        Clinically Significant Risk Factors                   # Hypertension: Noted on problem list     # Dementia: noted on problem list            # Financial/Environmental Concerns: none         DVT Prophylaxis: Pneumatic Compression Devices  Code Status: Full Code      Medically  Ready for Discharge: Anticipated Tomorrow patient medically stable , awaiting discharge to this facility    Armando Kern MD  [facility]  Contact information available via [facility] Paging/Directory        Interval History   Patient remained calmer this morning,  Sitter at bedside no complaint overnight.    No other significant event overnight    -Data reviewed today: I reviewed all new labs and imaging results over the last 24 hours. I personally reviewed no images or EKG's today.    Physical Exam                      There were no vitals filed for this visit.  Vital Signs with Ranges     No intake/output data recorded.    Constitutional: Awake and alert, in no acute distress  Respiratory: No increased work of breathing, bilateral air entry equal  Cardiovascular: S1-S2 normal.  Skin/Integumen: No rash on exposed skin.    CNS: No focal deficit     Medications   Current Facility-Administered Medications   Medication Dose Route Frequency Provider Last Rate Last Admin     Current Facility-Administered Medications   Medication Dose Route Frequency Provider Last Rate Last Admin    amLODIPine (NORVASC) tablet 10 mg  10 mg Oral Daily Armando Kern MD   10 mg at 04/11/25 0943    atorvastatin (LIPITOR) tablet 40 mg  40 mg Oral Daily Armando Kern MD   40 mg at 04/10/25 2102    cinacalcet (SENSIPAR) tablet 30 mg  30 mg Oral Daily Armando Kern MD   30 mg at 04/11/25 0943    cloNIDine (CATAPRES) tablet 0.1 mg  0.1 mg Oral BID Armando Kern MD   0.1 mg at 04/11/25 0943    divalproex sodium extended-release (DEPAKOTE ER) 24 hr tablet 1,000 mg  1,000 mg Oral BID Eduardo Reza PA-C   1,000 mg at 04/11/25 0943    furosemide (LASIX) tablet 40 mg  40 mg Oral Daily Armando Kern MD   40 mg at 04/11/25 0943    lisinopril (ZESTRIL) tablet 40 mg  40 mg Oral Daily Armando Kern MD   40 mg at 04/11/25 0943    melatonin tablet 3 mg  3 mg Oral At Bedtime Oleksandr Kong MD   3 mg at  04/10/25 2103    metoprolol succinate ER (TOPROL XL) 24 hr tablet 50 mg  50 mg Oral Daily Armando Kern MD   50 mg at 04/11/25 0943    [Held by provider] OLANZapine (zyPREXA) tablet 10 mg  10 mg Oral BID Armando Kern MD   10 mg at 04/06/25 0858    [Held by provider] OLANZapine (zyPREXA) tablet 15 mg  15 mg Oral At Bedtime Armando Kern MD        QUEtiapine (SEROquel XR) 24 hr tablet 400 mg  400 mg Oral At Bedtime Eduardo Reza PA-C   400 mg at 04/10/25 2103    sennosides (SENOKOT) tablet 1 tablet  1 tablet Oral Daily Armando Kern MD   1 tablet at 04/09/25 0802    sodium chloride (PF) 0.9% PF flush 3 mL  3 mL Intracatheter Q8H Oleksandr Kong MD           Data   Recent Labs   Lab 04/04/25  2110   WBC 9.1   HGB 13.2*   MCV 63*         POTASSIUM 3.9   CHLORIDE 102   CO2 26   BUN 15.9   CR 0.89   ANIONGAP 11   UDAY 10.5*   *       No results found for this or any previous visit (from the past 24 hours).

## 2025-04-11 NOTE — PROGRESS NOTES
Care Management Follow Up    Length of Stay (days): 4    Expected Discharge Date: 04/11/2025     Concerns to be Addressed: discharge planning     Patient plan of care discussed at interdisciplinary rounds: Yes    Anticipated Discharge Disposition:  [facility]     Anticipated Discharge Services:    Anticipated Discharge DME:      Patient/family educated on Medicare website which has current facility and service quality ratings:    Education Provided on the Discharge Plan:    Patient/Family in Agreement with the Plan: yes    Referrals Placed by CM/SW:    Private pay costs discussed: Not applicable    Discussed  Partnership in Safe Discharge Planning  document with patient/family: No     Handoff Completed: No, handoff not indicated or clinically appropriate    Additional Information:  Left  for [facility] WAYNE Ryan.  Called Vera,  at [facility]. Discussed how patient is currently doing. Vera quickly started to state they don't feel they can have patient return, she states that they have had other residents give their notice due to patient, and staff are afraid of him. SW reminded Vera that [place] is patient's home and legally he has a right to return, they would have to give proper eviction process. Vera stated that they have sent the emergency relocation notice to the family and Ombudsman. DEBORAH clarified, if this was an eviction notice, Vera said no, this was a notice that when someone leaves the community is sent out. DEBORAH reiterated that without a formal eviction, this doesn't mean pt cannot return. Vera states they have been discussing having a meeting with ex-wife, and WAYNE Ryan to discuss alternative placement. Vera mentioned a facility, [facility] in [place], which has a psychiatrist on-site. DEBORAH reiterated that the hospital is not someone's long-term placement, so we cannot keep patient's here if they do have a home to go to, once deemed medically stable.  "Vera states understanding, but this is a \"different situation.\" Reiterated to Vera that racheal-psych has not been recommended by Psych at this time, and informed her of the current medication recommendations. Vera said they require all their residents to have a guardian or POA, and pt doesn't have one, so this adds \"another factor\" (although pt has been living there without a formal guardian).   Vera is aware that a return is the recommendations of the hospital at this time. Vera was in agreement to monitor how pt does this weekend and call her on Monday to see when their RN can come assess for a return, stating an assessment would not be able to take place today.     Spoke with Shelby from [facility] to relay conversation with Vera. Shelby stated that if [facility] does end up giving an eviction notice, ex-wife Sharon should be notified to start looking at new placements as soon as possible as it took a long time last time. Informed Shelby that SW will call her. Shelby also mentioned that she would recommend getting any PRN medications in place if pt returns to [facility], so that they can have these on hand in case there's a time where pt is very escalated. Shelby asked DEBORAH to mention to Clifford that if there are any updates regarding the guardianship paperwork to please call her and touch base.     Spoke with ex-wife Clifford to relay the above conversations and inform of where things are at with patient. Clifford states she would not be surprised if [facility] does end up requesting pt to move from their community. Discussed [place] facility mentioned by Ofelia as a possibility to look into. Relayed request from Shelby with [facility], Clifford states there are no guardianship updates but she will touch base with Shelby.     Ofelia Abrams, INDERJIT  Social Work  [facility]    "

## 2025-04-11 NOTE — PLAN OF CARE
"PRIMARY Concern: Aggression at memory care  SAFETY RISK Concerns (fall risk, behaviors, etc.): fall risk      Aggression Tool Color: Yellow: remained calm if left alone. When trying to get pt to take his meds pt got agitated, and pt also got agitated towards female staff saying \"get the fuck out of my room\" but calmed down when staff stepped out of his room  Isolation/Type: n/a  Tests/Procedures for NEXT shift: none  Consults? (Pending/following, signed-off?) Psych following; made med changes   Where is patient from? (Home, TCU, etc.): memory care [facility]  Other Important info for NEXT shift: pt refused to take meds today despite multiple attempts  Anticipated DC date & active delays: TBD, psych recommended pt return to prior facility, see SW notes for details  _____________________________________________________________________________  SUMMARY NOTE:   Orientation/Cognitive: Confused, oriented to self only  Observation Goals (Met/ Not Met): Inpatient2  Mobility Level/Assist Equipment: SBA  Antibiotics & Plan (IV/po, length of tx left): n/a  Pain Management: denies pain  Complete Pain Reassessment: Y/N Y Due next: next shift  Tele/VS/O2: VSS on RA  ABNL Lab/BG: WDL  Diet: tolerating a regular diet  Bowel/Bladder: Continent  Skin Concerns: scab to R shin  Drains/Devices: no PIV  Patient Stated Goal for Today: NARESH      "

## 2025-04-12 PROCEDURE — 250N000013 HC RX MED GY IP 250 OP 250 PS 637: Performed by: INTERNAL MEDICINE

## 2025-04-12 PROCEDURE — 120N000001 HC R&B MED SURG/OB

## 2025-04-12 PROCEDURE — 99232 SBSQ HOSP IP/OBS MODERATE 35: CPT | Performed by: INTERNAL MEDICINE

## 2025-04-12 PROCEDURE — 250N000013 HC RX MED GY IP 250 OP 250 PS 637: Performed by: HOSPITALIST

## 2025-04-12 PROCEDURE — 250N000013 HC RX MED GY IP 250 OP 250 PS 637: Performed by: PHYSICIAN ASSISTANT

## 2025-04-12 RX ADMIN — QUETIAPINE FUMARATE 25 MG: 25 TABLET ORAL at 18:37

## 2025-04-12 RX ADMIN — DIVALPROEX SODIUM 1000 MG: 500 TABLET, FILM COATED, EXTENDED RELEASE ORAL at 11:46

## 2025-04-12 RX ADMIN — CLONIDINE HYDROCHLORIDE 0.1 MG: 0.1 TABLET ORAL at 11:46

## 2025-04-12 RX ADMIN — CLONIDINE HYDROCHLORIDE 0.1 MG: 0.1 TABLET ORAL at 19:40

## 2025-04-12 RX ADMIN — DIVALPROEX SODIUM 1000 MG: 500 TABLET, FILM COATED, EXTENDED RELEASE ORAL at 19:41

## 2025-04-12 RX ADMIN — MELATONIN TAB 3 MG 3 MG: 3 TAB at 21:03

## 2025-04-12 RX ADMIN — AMLODIPINE BESYLATE 10 MG: 10 TABLET ORAL at 10:25

## 2025-04-12 RX ADMIN — SENNOSIDES 1 TABLET: 8.6 TABLET ORAL at 11:47

## 2025-04-12 RX ADMIN — CINACALCET 30 MG: 30 TABLET ORAL at 11:15

## 2025-04-12 RX ADMIN — ATORVASTATIN CALCIUM 40 MG: 40 TABLET, FILM COATED ORAL at 19:41

## 2025-04-12 RX ADMIN — QUETIAPINE 400 MG: 300 TABLET, FILM COATED, EXTENDED RELEASE ORAL at 21:03

## 2025-04-12 ASSESSMENT — ACTIVITIES OF DAILY LIVING (ADL)
ADLS_ACUITY_SCORE: 54
ADLS_ACUITY_SCORE: 54
ADLS_ACUITY_SCORE: 55
ADLS_ACUITY_SCORE: 54
ADLS_ACUITY_SCORE: 55
ADLS_ACUITY_SCORE: 54
ADLS_ACUITY_SCORE: 55
ADLS_ACUITY_SCORE: 55
ADLS_ACUITY_SCORE: 54
ADLS_ACUITY_SCORE: 54
ADLS_ACUITY_SCORE: 55
ADLS_ACUITY_SCORE: 54
ADLS_ACUITY_SCORE: 55
ADLS_ACUITY_SCORE: 55

## 2025-04-12 NOTE — PROGRESS NOTES
[facility]    Hospitalist Progress Note    Brief Summary:  Estevan Aaron is a 64 year old male admitted on 4/4/2025.  Past history of HTN, HLD, hyperparathyroidism, dementia who presents from his memory care facility with aggressive behaviors.  Reportedly the facility has refused to take him back until seen by a provider and had medications adjusted.     Assessment & Plan      Alzheimer's dementia with agitation  *reportedly aggressive behaviors at [facility] care  - has been calm and cooperative with staff in the ED  Overnight as had events with the nursing staff when he tried to hit them.  *was hospitalized in inpatient Psych from Oct 2024 through 2/24/25; appears he was discharged on oral zyprexa  - BMP/CBC unremarkable, UA not sent.  - Started on seroquel 50 mg at bedtime and 12.5 mg bid prn  - Prior to admission patient was on Zyprexa 10 mg twice daily and 15 mg at bedtime.  Patient was eval by the psychiatry on 4/7/2025.    His Seroquel dose increased to 300 mg at nighttime, and started on Depakote as well.  Zyprexa discontinued at this time.  Psych is following, his Seroquel dose further increased to 400 mg, and Depakote increased to 1000 mg twice daily  Overall stable at this time.  Psych is following and cleared him for discharge.   to work on discharging, he is medically stable at this time.  Cleared by the psych  Overall remained stable at this time, no significant agitations.  Continue on the current medications  Awaiting disposition.      HTN  - continue PTA amlodipine, clonidine, lasix, lisinopril, metoprolol   Blood pressure well-controlled with his PTA medications at this time.     HLD  - continue PTA atorvastatin      Hyperparathyroidism  - Continue Sensipar        Clinically Significant Risk Factors                   # Hypertension: Noted on problem list     # Dementia: noted on problem list            # Financial/Environmental Concerns: none         DVT  Prophylaxis: Pneumatic Compression Devices  Code Status: Full Code      Medically Ready for Discharge: Anticipated Tomorrow patient medically stable , awaiting discharge to this facility    Armando Kern MD  [facility]  Contact information available via [facility] Paging/Directory        Interval History   Patient seen and evaluated in his room today, sitting on the edge of the bed, calmer, offers no complaints.    No other significant event overnight      -Data reviewed today: I reviewed all new labs and imaging results over the last 24 hours. I personally reviewed no images or EKG's today.    Physical Exam     Temp src: Axillary BP: 138/87 Pulse: 88   Resp: 18 SpO2: 95 % O2 Device: None (Room air)    There were no vitals filed for this visit.  Vital Signs with Ranges  Pulse:  [88] 88  Resp:  [18] 18  BP: (138)/(87) 138/87  SpO2:  [95 %] 95 %  I/O last 3 completed shifts:  In: 240 [P.O.:240]  Out: -     Constitutional: Awake and alert, in no acute distress  Respiratory: No increased work of breathing, bilateral air entry equal  Cardiovascular: S1-S2 normal.  Skin/Integumen: No rash on exposed skin.    CNS: No focal deficit     Medications   Current Facility-Administered Medications   Medication Dose Route Frequency Provider Last Rate Last Admin     Current Facility-Administered Medications   Medication Dose Route Frequency Provider Last Rate Last Admin    amLODIPine (NORVASC) tablet 10 mg  10 mg Oral Daily Armando Kern MD   10 mg at 04/10/25 1052    atorvastatin (LIPITOR) tablet 40 mg  40 mg Oral Daily Armando Kern MD   40 mg at 04/11/25 2127    cinacalcet (SENSIPAR) tablet 30 mg  30 mg Oral Daily Armando Kern MD   30 mg at 04/10/25 1052    cloNIDine (CATAPRES) tablet 0.1 mg  0.1 mg Oral BID Armando Kern MD   0.1 mg at 04/11/25 2128    divalproex sodium extended-release (DEPAKOTE ER) 24 hr tablet 1,000 mg  1,000 mg Oral BID Eduardo Reza PA-C   1,000 mg at 04/11/25  2127    furosemide (LASIX) tablet 40 mg  40 mg Oral Daily Armando Kern MD   40 mg at 04/10/25 1052    lisinopril (ZESTRIL) tablet 40 mg  40 mg Oral Daily Armando Kern MD   40 mg at 04/10/25 1051    melatonin tablet 3 mg  3 mg Oral At Bedtime Oleksandr Kong MD   3 mg at 04/11/25 2127    metoprolol succinate ER (TOPROL XL) 24 hr tablet 50 mg  50 mg Oral Daily Armando Kern MD   50 mg at 04/10/25 1051    [Held by provider] OLANZapine (zyPREXA) tablet 10 mg  10 mg Oral BID Armando Kern MD   10 mg at 04/06/25 0858    [Held by provider] OLANZapine (zyPREXA) tablet 15 mg  15 mg Oral At Bedtime Armando Kern MD        QUEtiapine (SEROquel XR) 24 hr tablet 400 mg  400 mg Oral At Bedtime Eduardo Reza PA-C   400 mg at 04/11/25 2127    sennosides (SENOKOT) tablet 1 tablet  1 tablet Oral Daily Armando Kern MD   1 tablet at 04/09/25 0802    sodium chloride (PF) 0.9% PF flush 3 mL  3 mL Intracatheter Q8H Oleksandr Kong MD           Data   No lab results found in last 7 days.      No results found for this or any previous visit (from the past 24 hours).

## 2025-04-12 NOTE — PROGRESS NOTES
[id number]    PRIMARY Concern: Admitted for aggressive behavior at [facility]. History of Alzheimer's dementia with agitation  SAFETY RISK Concerns (fall risk, behaviors, etc.): Falls  Aggression Tool Color: Green to Yellow  Tests/Procedures for NEXT shift: AM labs  Consults? (Pending/following, signed-off?) Psych consult  Where is patient from? (Home, TCU, etc.): memory care  Other Important info for NEXT shift: 1:1 sitter at bedside.   Anticipated DC date & active delays: TBD.       SUMMARY NOTE:   Orientation/Cognitive: Alert- Confused- Cooperative this shift but restless.  Observation Goals (Met/ Not Met): Inpatient  Mobility Level/Assist Equipment:  SBA  Antibiotics & Plan (IV/po, length of tx l ft): N/A  Pain Management: Denies  Complete Pain Reassessment: Y/N no Due next: next shift   Tele/VS/O2: Patient refusing vitals  ABNL Lab/BG:   Diet: Reg  Bowel/Bladder: Cont/ incont @ times. No BM this shift.  Skin Concerns: scab at right shin  Drains/Devices: No PIV  Patient Stated Goal for Today: NARESH

## 2025-04-12 NOTE — PROGRESS NOTES
The patient is sleeping right now; he is in no acute distress; no complaints whatsoever.  Calm, relaxed, no behavior issues have been noted during my shift.

## 2025-04-12 NOTE — PLAN OF CARE
PRIMARY Concern: Admitted for aggressive behavior at memory care. History of Alzheimer's dementia with agitation  SAFETY RISK Concerns (fall risk, behaviors, etc.): Falls  Aggression Tool Color: Green to Yellow  Tests/Procedures for NEXT shift: none  Consults? (Pending/following, signed-off?) Psych following but did clear patient for discharge, increased seroquel and depakote.  Where is patient from? (Home, TCU, etc.): memory care  Other Important info for NEXT shift: 1:1 sitter at bedside.   Anticipated DC date & active delays: TBD.     SUMMARY NOTE:  Orientation/Cognitive: Alert- Confused- Calm & Asleep throughout the night  Observation Goals (Met/ Not Met): Inpatient  Mobility Level/Assist Equipment:  SBA  Antibiotics & Plan (IV/po, length of tx l ft): N/A  Pain Management: Denies  Complete Pain Reassessment: Y/N no Due next: next shift   Tele/VS/O2: Patient refusing vitals  ABNL Lab/BG:   Diet: Reg  Bowel/Bladder: Cont/ incont @ times. No BM this shift.  Skin Concerns: scab at right shin  Drains/Devices: No PIV  Patient Stated Goal for Today: NARESH

## 2025-04-12 NOTE — PROGRESS NOTES
Santos is awake and seems to be comfortable.  He is walking around the room, calm, relaxed.   He refused his morning medication. Andrea confused, unable to answer any of my questions.

## 2025-04-13 PROCEDURE — 250N000013 HC RX MED GY IP 250 OP 250 PS 637: Performed by: PHYSICIAN ASSISTANT

## 2025-04-13 PROCEDURE — 120N000001 HC R&B MED SURG/OB

## 2025-04-13 PROCEDURE — 250N000013 HC RX MED GY IP 250 OP 250 PS 637: Performed by: INTERNAL MEDICINE

## 2025-04-13 PROCEDURE — 99232 SBSQ HOSP IP/OBS MODERATE 35: CPT | Performed by: INTERNAL MEDICINE

## 2025-04-13 PROCEDURE — 250N000013 HC RX MED GY IP 250 OP 250 PS 637: Performed by: HOSPITALIST

## 2025-04-13 RX ADMIN — ATORVASTATIN CALCIUM 40 MG: 40 TABLET, FILM COATED ORAL at 22:12

## 2025-04-13 RX ADMIN — CLONIDINE HYDROCHLORIDE 0.1 MG: 0.1 TABLET ORAL at 22:13

## 2025-04-13 RX ADMIN — CINACALCET 30 MG: 30 TABLET ORAL at 11:49

## 2025-04-13 RX ADMIN — LISINOPRIL 40 MG: 40 TABLET ORAL at 11:50

## 2025-04-13 RX ADMIN — DIVALPROEX SODIUM 1000 MG: 500 TABLET, FILM COATED, EXTENDED RELEASE ORAL at 11:50

## 2025-04-13 RX ADMIN — QUETIAPINE FUMARATE 25 MG: 25 TABLET ORAL at 16:39

## 2025-04-13 RX ADMIN — DIVALPROEX SODIUM 1000 MG: 500 TABLET, FILM COATED, EXTENDED RELEASE ORAL at 22:14

## 2025-04-13 RX ADMIN — FUROSEMIDE 40 MG: 40 TABLET ORAL at 11:50

## 2025-04-13 RX ADMIN — CLONIDINE HYDROCHLORIDE 0.1 MG: 0.1 TABLET ORAL at 11:49

## 2025-04-13 RX ADMIN — QUETIAPINE 400 MG: 300 TABLET, FILM COATED, EXTENDED RELEASE ORAL at 22:14

## 2025-04-13 RX ADMIN — MELATONIN TAB 3 MG 3 MG: 3 TAB at 22:13

## 2025-04-13 RX ADMIN — SENNOSIDES 1 TABLET: 8.6 TABLET ORAL at 11:51

## 2025-04-13 RX ADMIN — METOPROLOL SUCCINATE 50 MG: 50 TABLET, EXTENDED RELEASE ORAL at 11:51

## 2025-04-13 RX ADMIN — AMLODIPINE BESYLATE 10 MG: 10 TABLET ORAL at 11:49

## 2025-04-13 ASSESSMENT — ACTIVITIES OF DAILY LIVING (ADL)
ADLS_ACUITY_SCORE: 54
ADLS_ACUITY_SCORE: 58
ADLS_ACUITY_SCORE: 54
ADLS_ACUITY_SCORE: 58
ADLS_ACUITY_SCORE: 54

## 2025-04-13 NOTE — PROGRESS NOTES
[id number]     PRIMARY Concern: Admitted for aggressive behavior at [facility]. History of Alzheimer's dementia with agitation  SAFETY RISK Concerns (fall risk, behaviors, etc.): Falls  Aggression Tool Color: Green to Yellow  Tests/Procedures for NEXT shift: AM labs  Consults? (Pending/following, signed-off?) Psych consult  Where is patient from? (Home, TCU, etc.): memory care  Other Important info for NEXT shift: 1:1 sitter at bedside.   -PRN seroquel given 1x for agitation- wandering out in hallway- frequently - Pt needing frequent redirection. Cooperative in medication management.  Anticipated DC date & active delays: TBD.         SUMMARY NOTE:   Orientation/Cognitive: Alert- Confused- Cooperative this shift but restless.  Observation Goals (Met/ Not Met): Inpatient  Mobility Level/Assist Equipment: Ind  Antibiotics & Plan (IV/po, length of tx l ft): N/A  Pain Management: Denies  Complete Pain Reassessment: Y/N no Due next: next shift   Tele/VS/O2: Patient refusing vitals  ABNL Lab/BG:   Diet: Reg  Bowel/Bladder: Cont/ incont @ times. No BM this shift.  Skin Concerns: scab at right shin  Drains/Devices: No PIV  Patient Stated Goal for Today: NARESH

## 2025-04-13 NOTE — PROGRESS NOTES
PRIMARY Concern: Admitted for aggressive behavior at [facility] care. History of Alzheimer's dementia with agitation  SAFETY RISK Concerns (fall risk, behaviors, etc.): Falls.   Aggression Tool Color: Green to Yellow  Tests/Procedures for NEXT shift: AM labs  Consults? (Pending/following, signed-off?) Psych consult  Where is patient from? (Home, TCU, etc.): memory care  Other Important info for NEXT shift: 1:1 sitter at bedside.  Anticipated DC date & active delays: TBD.   SUMMARY NOTE:  Patient became agitated at 1600, staff unable to redirect despite multiple attempts. PRN Seroquel given, now resting.   Patient still at risk to self and others and requires continuous redirection especially when he comes out of bedroom.   Orientation/Cognitive: Alert- Confused.  Observation Goals (Met/ Not Met): Inpatient  Mobility Level/Assist Equipment: Ind  Antibiotics & Plan (IV/po, length of tx l ft): N/A  Pain Management: Denies  Complete Pain Reassessment: Y/N no Due next: next shift   Tele/VS/O2:  ABNL Lab/BG:   Diet: Reg  Bowel/Bladder: Cont/ incont @ times. No BM this shift.  Skin Concerns: scab at right shin  Drains/Devices: No PIV  Patient Stated Goal for Today: NARESH

## 2025-04-13 NOTE — PLAN OF CARE
Goal Outcome Evaluation:    Pt is oriented to self only, confused. Sitter at bedside. Pain deneis. Ind in room. Continent B/B. Regular diet. No PIV. Scab at R shin. Psych following, recommend pt to go back to previous facility. Discharge pending.

## 2025-04-13 NOTE — PROGRESS NOTES
[facility]    Hospitalist Progress Note    Brief Summary:  Estevan Aaron is a 64 year old male admitted on 4/4/2025.  Past history of HTN, HLD, hyperparathyroidism, dementia who presents from his memory care facility with aggressive behaviors.  Reportedly the facility has refused to take him back until seen by a provider and had medications adjusted.     Assessment & Plan      Alzheimer's dementia with agitation  *reportedly aggressive behaviors at [facility] care  - has been calm and cooperative with staff in the ED  Overnight as had events with the nursing staff when he tried to hit them.  *was hospitalized in inpatient Psych from Oct 2024 through 2/24/25; appears he was discharged on oral zyprexa  - BMP/CBC unremarkable, UA not sent.  - Started on seroquel 50 mg at bedtime and 12.5 mg bid prn  - Prior to admission patient was on Zyprexa 10 mg twice daily and 15 mg at bedtime.  Patient was eval by the psychiatry on 4/7/2025.    His Seroquel dose increased to 300 mg at nighttime, and started on Depakote as well.  Zyprexa discontinued at this time.  Psych is following, his Seroquel dose further increased to 400 mg, and Depakote increased to 1000 mg twice daily  Overall stable at this time.  Psych is following and cleared him for discharge.   to work on discharging, he is medically stable at this time.  Cleared by the psych  Patient remained stable at this time.  Continue Seroquel 40 mg daily at night and Depakote 1000 mg twice daily.      HTN  - continue PTA amlodipine, clonidine, lasix, lisinopril, metoprolol   Blood pressure well-controlled with his PTA medications at this time.     HLD  - continue PTA atorvastatin      Hyperparathyroidism  - Continue Sensipar        Clinically Significant Risk Factors                   # Hypertension: Noted on problem list     # Dementia: noted on problem list            # Financial/Environmental Concerns: none         DVT Prophylaxis: Pneumatic  Compression Devices  Code Status: Full Code      Medically Ready for Discharge: Anticipated Tomorrow patient medically stable , awaiting discharge to this facility    Armando Kern MD  [facility]  Contact information available via [facility] Paging/Directory        Interval History   Patient was sleeping in the chair this morning, was awake earlier this morning, remained calm, no agitation at this time.    No other significant event overnight t      -Data reviewed today: I reviewed all new labs and imaging results over the last 24 hours. I personally reviewed no images or EKG's today.    Physical Exam                      There were no vitals filed for this visit.  Vital Signs with Ranges     I/O last 3 completed shifts:  In: 440 [P.O.:440]  Out: -     Constitutional: In comfortably in chair at this time,  Respiratory: No increased work of breathing, bilateral air entry equal  Cardiovascular: S1-S2 normal.  Skin/Integumen: No rash on exposed skin.    CNS: No focal deficit     Medications   Current Facility-Administered Medications   Medication Dose Route Frequency Provider Last Rate Last Admin     Current Facility-Administered Medications   Medication Dose Route Frequency Provider Last Rate Last Admin    amLODIPine (NORVASC) tablet 10 mg  10 mg Oral Daily Armando Kern MD   10 mg at 04/12/25 1025    atorvastatin (LIPITOR) tablet 40 mg  40 mg Oral Daily Armando Kern MD   40 mg at 04/12/25 1941    cinacalcet (SENSIPAR) tablet 30 mg  30 mg Oral Daily Armando Kern MD   30 mg at 04/12/25 1115    cloNIDine (CATAPRES) tablet 0.1 mg  0.1 mg Oral BID Armando Kern MD   0.1 mg at 04/12/25 1940    divalproex sodium extended-release (DEPAKOTE ER) 24 hr tablet 1,000 mg  1,000 mg Oral BID Eduardo Reza PA-C   1,000 mg at 04/12/25 1941    furosemide (LASIX) tablet 40 mg  40 mg Oral Daily Armando Kern MD   40 mg at 04/10/25 1052    lisinopril (ZESTRIL) tablet 40 mg  40 mg Oral  Daily Armando Kern MD   40 mg at 04/10/25 1051    melatonin tablet 3 mg  3 mg Oral At Bedtime Oleksandr Kong MD   3 mg at 04/12/25 2103    metoprolol succinate ER (TOPROL XL) 24 hr tablet 50 mg  50 mg Oral Daily Armando Kern MD   50 mg at 04/10/25 1051    [Held by provider] OLANZapine (zyPREXA) tablet 10 mg  10 mg Oral BID Armando Kern MD   10 mg at 04/06/25 0858    [Held by provider] OLANZapine (zyPREXA) tablet 15 mg  15 mg Oral At Bedtime Armando Kern MD        QUEtiapine (SEROquel XR) 24 hr tablet 400 mg  400 mg Oral At Bedtime Eduardo Reza PA-C   400 mg at 04/12/25 2103    sennosides (SENOKOT) tablet 1 tablet  1 tablet Oral Daily Armando Kern MD   1 tablet at 04/12/25 1147       Data   No lab results found in last 7 days.      No results found for this or any previous visit (from the past 24 hours).

## 2025-04-13 NOTE — PROGRESS NOTES
PRIMARY Concern: Admitted for aggressive behavior at memory care. History of Alzheimer's dementia with agitation  SAFETY RISK Concerns (fall risk, behaviors, etc.): Falls  Aggression Tool Color: Green to Yellow  Tests/Procedures for NEXT shift: AM labs  Consults? (Pending/following, signed-off?) Psych consult  Where is patient from? (Home, TCU, etc.): memory care  Other Important info for NEXT shift: 1:1 sitter at bedside.  Anticipated DC date & active delays: TBD.   SUMMARY NOTE:  Patient wonders frequently but mostly staying in room today. Refused vital sign check and AM medications but later complied after staff re-approached multiple times.   Has been very delusional but no aggression toward staff, very high risk of elopement due to unawareness of safety risk. Patient would go anywhere without direction.    Orientation/Cognitive: Alert- Confused.  Observation Goals (Met/ Not Met): Inpatient  Mobility Level/Assist Equipment: Ind  Antibiotics & Plan (IV/po, length of tx l ft): N/A  Pain Management: Denies  Complete Pain Reassessment: Y/N no Due next: next shift   Tele/VS/O2:  ABNL Lab/BG:   Diet: Reg  Bowel/Bladder: Cont/ incont @ times. No BM this shift.  Skin Concerns: scab at right shin  Drains/Devices: No PIV  Patient Stated Goal for Today: NARESH

## 2025-04-14 PROCEDURE — 120N000001 HC R&B MED SURG/OB

## 2025-04-14 PROCEDURE — 250N000013 HC RX MED GY IP 250 OP 250 PS 637: Performed by: PHYSICIAN ASSISTANT

## 2025-04-14 PROCEDURE — 250N000013 HC RX MED GY IP 250 OP 250 PS 637: Performed by: INTERNAL MEDICINE

## 2025-04-14 PROCEDURE — 99207 PR NO BILLABLE SERVICE THIS VISIT: CPT

## 2025-04-14 PROCEDURE — 99232 SBSQ HOSP IP/OBS MODERATE 35: CPT | Performed by: PHYSICIAN ASSISTANT

## 2025-04-14 PROCEDURE — 99232 SBSQ HOSP IP/OBS MODERATE 35: CPT | Performed by: INTERNAL MEDICINE

## 2025-04-14 PROCEDURE — 250N000013 HC RX MED GY IP 250 OP 250 PS 637: Performed by: HOSPITALIST

## 2025-04-14 RX ORDER — OLANZAPINE 10 MG/2ML
5 INJECTION, POWDER, FOR SOLUTION INTRAMUSCULAR DAILY PRN
Status: DISCONTINUED | OUTPATIENT
Start: 2025-04-14 | End: 2025-05-13 | Stop reason: HOSPADM

## 2025-04-14 RX ADMIN — FUROSEMIDE 40 MG: 40 TABLET ORAL at 08:37

## 2025-04-14 RX ADMIN — CLONIDINE HYDROCHLORIDE 0.1 MG: 0.1 TABLET ORAL at 08:37

## 2025-04-14 RX ADMIN — CINACALCET 30 MG: 30 TABLET ORAL at 08:37

## 2025-04-14 RX ADMIN — ATORVASTATIN CALCIUM 40 MG: 40 TABLET, FILM COATED ORAL at 20:36

## 2025-04-14 RX ADMIN — AMLODIPINE BESYLATE 10 MG: 10 TABLET ORAL at 08:37

## 2025-04-14 RX ADMIN — DIVALPROEX SODIUM 1000 MG: 500 TABLET, FILM COATED, EXTENDED RELEASE ORAL at 12:09

## 2025-04-14 RX ADMIN — SENNOSIDES 1 TABLET: 8.6 TABLET ORAL at 08:37

## 2025-04-14 RX ADMIN — CLONIDINE HYDROCHLORIDE 0.1 MG: 0.1 TABLET ORAL at 20:36

## 2025-04-14 RX ADMIN — LISINOPRIL 40 MG: 40 TABLET ORAL at 08:37

## 2025-04-14 RX ADMIN — MELATONIN TAB 3 MG 3 MG: 3 TAB at 20:36

## 2025-04-14 RX ADMIN — QUETIAPINE FUMARATE 25 MG: 25 TABLET ORAL at 06:50

## 2025-04-14 RX ADMIN — DIVALPROEX SODIUM 1000 MG: 500 TABLET, FILM COATED, EXTENDED RELEASE ORAL at 20:36

## 2025-04-14 RX ADMIN — QUETIAPINE 400 MG: 300 TABLET, FILM COATED, EXTENDED RELEASE ORAL at 20:36

## 2025-04-14 RX ADMIN — METOPROLOL SUCCINATE 50 MG: 50 TABLET, EXTENDED RELEASE ORAL at 08:37

## 2025-04-14 ASSESSMENT — ACTIVITIES OF DAILY LIVING (ADL)
ADLS_ACUITY_SCORE: 58
ADLS_ACUITY_SCORE: 62
ADLS_ACUITY_SCORE: 58
ADLS_ACUITY_SCORE: 58
ADLS_ACUITY_SCORE: 62
ADLS_ACUITY_SCORE: 58
ADLS_ACUITY_SCORE: 62
ADLS_ACUITY_SCORE: 58
ADLS_ACUITY_SCORE: 62
ADLS_ACUITY_SCORE: 58
ADLS_ACUITY_SCORE: 62
ADLS_ACUITY_SCORE: 58
ADLS_ACUITY_SCORE: 62

## 2025-04-14 NOTE — PLAN OF CARE
Goal Outcome Evaluation:  PRIMARY Concern: Admitted for aggressive behavior at [facility]. History of Alzheimer's dementia with agitation  SAFETY RISK Concerns (fall risk, behaviors, etc.): Fall risk    Aggression Tool Color: Green to Yellow  Tests/Procedures for NEXT shift: AM labs  Consults? (Pending/following, signed-off?) Psych and SW following   Where is patient from? (Home, TCU, etc.): Memory care  Other Important info for NEXT shift: 1:1 sitter at bedside.  Anticipated DC date & active delays: TBD.     SUMMARY NOTE:    Orientation/Cognitive: Alert- Confused.  Observation Goals (Met/ Not Met): Inpatient status   Mobility Level/Assist Equipment: Ind  Antibiotics & Plan (IV/po, length of tx l ft): N/A  Pain Management: Denies  Tele/VS/O2: VSS on RA   ABNL Lab/BG: None this shift    Diet: Regular   Bowel/Bladder: Cont/ incont @ times. No BM this shift.  Skin Concerns: scab at right shin  Drains/Devices: No PIV  Patient Stated Goal for Today: NARESH

## 2025-04-14 NOTE — PROGRESS NOTES
BRIEF NUTRITION ASSESSMENT    REASON FOR ASSESSMENT:  Estevan Aaron is a 64 year old male seen by Registered Dietitian for [facility]    NUTRITION HISTORY:  NARESH -  Admitted for aggressive behavior...Alzheimer's dementia with agitation, per RN notes. RRT this AM for code 21.     Malnutrition Risk Screen Score: 0  Have you recently lost weight without trying? No  Have you eaten poorly because of a decreased appetite? No    CURRENT DIET AND INTAKE:  Diet:  Orders Placed This Encounter      Combination Diet Regular Diet; Safe Tray - NO utensils    - Flowsheets show a good appetite and 2 intakes per day of %, mostly % but a few 50% intakes.   - Pt receiving standard behavioral trays and the sitter is setting up the meal tray for pt.     ANTHROPOMETRICS:  Height: Data Unavailable  Weight:  0 lbs 0 oz  There is no height or weight on file to calculate BMI.   IBW:  63.8 kg  %IBW: >120%  Weight History: trending up  Wt Readings from Last 10 Encounters:   02/23/25 114.9 kg (253 lb 6.4 oz)   02/20/24 104.3 kg (230 lb)   09/22/23 99.3 kg (219 lb)   02/17/23 97.5 kg (215 lb)   01/18/22 96.2 kg (212 lb)   01/04/22 96.7 kg (213 lb 1.6 oz)   04/15/21 93 kg (205 lb)   03/01/21 89.8 kg (198 lb)   01/25/21 89.8 kg (198 lb)   11/03/20 91.2 kg (201 lb)     LABS:  Labs noted    WOUNDS/SKIN:  none    MALNUTRITION:  Visual Nutrition Focused Physical Assessment (NFPA) not completed. Do not suspect muscle/fat losses.  Patient does not meet two of the following criteria necessary for diagnosing malnutrition.     % Weight Loss:  None noted  % Intake:  Decreased intake does not meet criteria for malnutrition  Subcutaneous Fat Loss:  Do not suspect muscle/fat losses.  Muscle Loss:  Do not suspect muscle/fat losses.  Fluid Retention:  Trace ankles    NUTRITION INTERVENTION:  Nutrition Diagnosis:  No nutrition diagnosis at this time.    Implementation:  Nutrition Education:  Per Provider order if indicated    FOLLOW UP/MONITORING:   Will  re-evaluate in 7 - 10 days, or sooner, if re-consulted.

## 2025-04-14 NOTE — CODE/RAPID RESPONSE
Brief House NP Note      Pt awoke at 4:30 am. Sitting at edge of edge of bed and then preceded to ambulate the halls disoriented. Prompting code 21. Responded to verbal reorientation. Will add 5 mg zyprexa IM daily prn; not utilized during code 21.      Yue Mina NP  [facility]  Securely message with the Vocera Web Console (learn more here)  Text page via [employer] Paging/Directory    No charge.

## 2025-04-14 NOTE — PROGRESS NOTES
Shift : [id number].  PRIMARY Concern: Admitted for aggressive behavior at [facility]. History of Alzheimer's dementia with agitation  SAFETY RISK Concerns (fall risk, behaviors, etc.): Fall risk    Aggression Tool Color: Green   Tests/Procedures for NEXT shift: AM labs  Consults? (Pending/following, signed-off?) Psych seen and cleared for discharge back to facility, increased seroquel and depakote. SW following for safe discharge plan.  Where is patient from? (Home, TCU, etc.): [facility], [place].  Other Important info for NEXT shift: 1:1 sitter at bedside. Code 21 activated, pt wandering around the desk, holding onto the chair and refused to go back to room and getting agitated, security assisted pt back to room.   Anticipated DC date & active delays: Pending placement    SUMMARY NOTE:    Orientation/Cognitive: Alert to self only. Confused. Calm and slept on and off, woke up 2-3 times overnight and up in chair sleeping, refused to go bed.  Observation Goals (Met/ Not Met): Inpatient status   Mobility Level/Assist Equipment: SBA w/gb.  Antibiotics & Plan (IV/po, length of tx l ft): N/A  Pain Management: no signs of pain noted.   Tele/VS/O2:  Refused VS.  ABNL Lab/BG: None this shift    Diet: Regular   Bowel/Bladder: Cont/ incont @ times. No BM overnight.  Skin Concerns: scab at right shin  Drains/Devices: No PIV  Patient Stated Goal for Today: NARESH

## 2025-04-14 NOTE — CONSULTS
Psychiatry Consultation; Follow up              Reason for Consult, requesting source:      Requesting source: Oleksandr Kong    Labs and imaging reviewed,     Total time spent in chart review, patient interview and coordination of care;            Interim history:    Today patient is seen siting in chair. Reponds to greeting slowly. Offers minimal interaction.   1:1 staff states patient has been mostly redirectable today. Did get more animated earlier this morning, but was able to accept PRN medication without much issue.     Depakote level is now theraputic. Behavior has steadily improved since admission. Current level of function is likely his baseline.        Current Medications:     Current Facility-Administered Medications   Medication Dose Route Frequency Provider Last Rate Last Admin    amLODIPine (NORVASC) tablet 10 mg  10 mg Oral Daily Armando Kern MD   10 mg at 04/14/25 0837    atorvastatin (LIPITOR) tablet 40 mg  40 mg Oral Daily Armando Kern MD   40 mg at 04/13/25 2212    cinacalcet (SENSIPAR) tablet 30 mg  30 mg Oral Daily Armando Kern MD   30 mg at 04/14/25 0837    cloNIDine (CATAPRES) tablet 0.1 mg  0.1 mg Oral BID Armando Kern MD   0.1 mg at 04/14/25 0837    divalproex sodium extended-release (DEPAKOTE ER) 24 hr tablet 1,000 mg  1,000 mg Oral BID Eduardo Reza PA-C   1,000 mg at 04/13/25 2214    furosemide (LASIX) tablet 40 mg  40 mg Oral Daily Armando Kern MD   40 mg at 04/14/25 0837    lisinopril (ZESTRIL) tablet 40 mg  40 mg Oral Daily Armando Kern MD   40 mg at 04/14/25 0837    melatonin tablet 3 mg  3 mg Oral At Bedtime Oleksandr Kong MD   3 mg at 04/13/25 2213    metoprolol succinate ER (TOPROL XL) 24 hr tablet 50 mg  50 mg Oral Daily Armando Kern MD   50 mg at 04/14/25 0837    [Held by provider] OLANZapine (zyPREXA) tablet 10 mg  10 mg Oral BID Armando Kern MD   10 mg at 04/06/25 0858    [Held by provider] OLANZapine (zyPREXA) tablet  "15 mg  15 mg Oral At Bedtime Armando Kern MD        QUEtiapine (SEROquel XR) 24 hr tablet 400 mg  400 mg Oral At Bedtime Eduardo Reza PA-C   400 mg at 04/13/25 2214    sennosides (SENOKOT) tablet 1 tablet  1 tablet Oral Daily Armando Kern MD   1 tablet at 04/14/25 0837              MSE:   Appearance: awake, alert  Attitude:  somewhat cooperative  Eye Contact:  poor   Mood:  \"fair\"  Affect:  slightly restricted  Speech:  mumbling  Psychomotor Behavior:  no evidence of tardive dyskinesia, dystonia, or tics  Muscle strength and tone: intact   Thought Process:  goal oriented  Associations:  no loose associations  Thought Content:  no evidence of suicidal ideation or homicidal ideation  Insight:  limited  Judgement:  limited  Oriented to:   Person  Attention Span and Concentration:  limited  Recent and Remote Memory:  limited    Vital signs:  Temp: 98  F (36.7  C) Temp src: Axillary BP: (!) 141/81 Pulse: 85   Resp: 18 SpO2: 93 % O2 Device: None (Room air)        Estimated body mass index is 40.9 kg/m  as calculated from the following:    Height as of 10/10/24: 1.676 m (5' 6\").    Weight as of 2/23/25: 114.9 kg (253 lb 6.4 oz).    Qtc:          DSM-5 Diagnosis:   Neurocognitive disorder          Assessment:   Patient has dementia with behavioral problems. Now at theraputic level of depakote. Seroquel at 400mg at bedtime seems to be sufficient. Continues to require frequent redirection, and display poor frustration tolerance, but has likely reached his baseline.   There is little benefit to be gained from prolonging this hospitalization. Recommend discharge back to memory care placement.            Summary of Recommendations:   Continue depakote 100mg BID  Continue seroquel 400mg at bedtime with PRN availible  Recommend discharge.  If additional input is needed please continue to place consult orders for psychiatry C&L service    \"Much or all of the text in this note was generated through the use of Dragon " "Dictate voice to text software. Errors in spelling or words which appear to be out of contact are unintentional, may be present due having escaped editing\"           "

## 2025-04-14 NOTE — PROGRESS NOTES
Shift : 6710-4749.  PRIMARY Concern: Admitted for aggressive behavior at [facility]. History of Alzheimer's dementia with agitation  SAFETY RISK Concerns (fall risk, behaviors, etc.): Fall risk    Aggression Tool Color: Green   Tests/Procedures for NEXT shift: AM labs  Consults? (Pending/following, signed-off?) Psych seen and cleared for discharge back to facility, increased seroquel and depakote. SW following for safe discharge plan.  Where is patient from? (Home, TCU, etc.): [facility], [place].  Other Important info for NEXT shift: 1:1 sitter at bedside. Code 21 activated, pt wandering around the desk, holding onto the chair and refused to go back to room and getting agitated, security came and assisted pt back to room. Prn po seroquel givenX1.  Anticipated DC date & active delays: Pending placement     SUMMARY NOTE:     Orientation/Cognitive: Alert to self only. Confused. Calm and slept on and off, woke up 2-3 times overnight and up in chair sleeping, refused to go bed.  Observation Goals (Met/ Not Met): Inpatient status   Mobility Level/Assist Equipment: SBA w/gb.  Antibiotics & Plan (IV/po, length of tx l ft): N/A  Pain Management: no signs of pain noted.   Tele/VS/O2:  Refused VS.  ABNL Lab/BG: None this shift    Diet: Regular   Bowel/Bladder: Cont/ incont @ times. No BM overnight.  Skin Concerns: scab at right shin  Drains/Devices: No PIV  Patient Stated Goal for Today: NARESH

## 2025-04-14 NOTE — PROGRESS NOTES
"Care Management Follow Up    Length of Stay (days): 7    Expected Discharge Date: 04/15/2025     Concerns to be Addressed: discharge planning     Patient plan of care discussed at interdisciplinary rounds: Yes    Anticipated Discharge Disposition:  Memory care     Anticipated Discharge Services:    Anticipated Discharge DME:      Patient/family educated on Medicare website which has current facility and service quality ratings:    Education Provided on the Discharge Plan:    Patient/Family in Agreement with the Plan: yes    Referrals Placed by CM/SW:    Private pay costs discussed: Not applicable    Discussed  Partnership in Safe Discharge Planning  document with patient/family: No     Handoff Completed: No, handoff not indicated or clinically appropriate    Additional Information:  Left VM for Vera at [facility] [phone] to request scheduling an RN assessment.     Received e-mail from Vera:  We are in need of current guardianship paperwork.  Have you talked to Sharon about this or assisted with this? I will let Sharon know as well.    Responded to Vera and FRANCESCA'aimee Ryan CM with [facility], to see if she knew of any updates. Explained that it is not in hospital SW's role to assist with this guardianship. Asked if there is a  phone number that SW can call to check on the status.     Contacted [facility] ([facility] with psychiatry on-site) to inquire about any openings, in case it may be needed. Left VM.     Received e-mail from manish Ryan CM with [facility]:   \"Maximiliano Horton,  I understand that the [facility] is trying to connect with [place] to have them reassess Santos, as the hospital believes he ready to return to the community. It s very important that Santos have a legal guardian assigned, in general, for his safety, but also due to how [place] requires this to be in place for him to live in their community.  I included Vera/Ofelia and Ofelia/, so " "they can see any updates. I m hoping you were able to connect with the  and voice the importance of things getting finalized ASAP.  If you haven t made any progress, I m curious if we would want/need to try to attain emergency status again, while simultaneously trying to get the permanent orders finalized as well. Not sure how emergency guardianship works (or if it s approved) while permanent guardianship is pending, but it is vital that we get a guardian on board and paperwork is received.\"    Clifford, ex-wife, responded to e-mail and stated she reached out to , [email or web address] to get an update on the guardianship and \"No just called and said I should have them tomorrow afternoon. The  has not signed the papers yet, Once he does, they ll be sent to me via \"    Maddy Pal [facility] diamond [email or web address], e-mailed SW to inform that she is familiar with [facility] since 's [facility] PCP rounds there, and they do not have a secured memory care unit so likely is not an appropriate place for patient.     Received e-mail from No  for guardianship:  Hello all  I just want to confirm what Clifford told you. In order to receive the Letters for General Guardianship/Conservatorship, Clifford and Maria C needed to acquire and pay for a bond. The Letters cannot be issued until they have signed and paid for the bond, and the bond has been sent to the court. While all other documents can be filed electronically, the bond has to be mailed. Right now the bond is sitting at the court, the  has not signed it but I will call the court again tomorrow morning to push them to both have the bond signed by the  and have the Letters signed by . We will then send a  to  the Letters.  [employer]  [phone]    Ofelia Abrams, INDERJIT  Social Work  [facility]       "

## 2025-04-14 NOTE — PROGRESS NOTES
PRIMARY Concern: Admitted for aggressive behavior at [facility]. History of Alzheimer's dementia with agitation  SAFETY RISK Concerns (fall risk, behaviors, etc.): Fall risk    Aggression Tool Color: Green   Tests/Procedures for NEXT shift: AM labs  Consults? (Pending/following, signed-off?) Psych seen and cleared for discharge back to facility, increased seroquel and depakote. SW following for safe discharge plan.  Where is patient from? (Home, TCU, etc.): [facility], [place].  Other Important info for NEXT shift: 1:1 sitter at bedside. Code green called once this shift for extra support, but patient was redirectable by staff. Patient was attempting to enter into other patient's rooms.   Anticipated DC date & active delays: Pending placement     SUMMARY NOTE:     Orientation/Cognitive: Alert to self only. Calm and cooperative this shift. Generally redirectable; no aggressive behavior.  Observation Goals (Met/ Not Met): Inpatient status   Mobility Level/Assist Equipment: SBA w/gb.  Antibiotics & Plan (IV/po, length of tx l ft): N/A  Pain Management: no signs of pain noted.   Tele/VS/O2:  Refused VS.  ABNL Lab/BG: None this shift    Diet: Regular   Bowel/Bladder: Cont/ incont @ times. No BM overnight.  Skin Concerns: scab at right shin  Drains/Devices: No PIV  Patient Stated Goal for Today: NARESH

## 2025-04-14 NOTE — PROGRESS NOTES
[facility]    Hospitalist Progress Note    Brief Summary:  Estevan Aaron is a 64 year old male admitted on 4/4/2025.  Past history of HTN, HLD, hyperparathyroidism, dementia who presents from his memory care facility with aggressive behaviors.  Reportedly the facility has refused to take him back until seen by a provider and had medications adjusted.     Assessment & Plan      Alzheimer's dementia with agitation  *reportedly aggressive behaviors at [facility] care  - has been calm and cooperative with staff in the ED  Overnight as had events with the nursing staff when he tried to hit them.  *was hospitalized in inpatient Psych from Oct 2024 through 2/24/25; appears he was discharged on oral zyprexa  - BMP/CBC unremarkable, UA not sent.  - Started on seroquel 50 mg at bedtime and 12.5 mg bid prn  - Prior to admission patient was on Zyprexa 10 mg twice daily and 15 mg at bedtime.  Patient was eval by the psychiatry on 4/7/2025.    His Seroquel dose increased to 300 mg at nighttime, and started on Depakote as well.  Zyprexa discontinued at this time.  Psych is following, his Seroquel dose further increased to 400 mg, and Depakote increased to 1000 mg twice daily  Overall stable at this time.  Psych is following and cleared him for discharge.   to work on discharging, he is medically stable at this time.  Cleared by the psych  Patient was stable, no significant agitations, is on Seroquel 40 mg at nighttime and Depakote 1000 mg twice daily.  I will ask psych to follow-up again to see if they need any further adjustment of his medications.  Awaiting for safe disposition at this time.    HTN  - continue PTA amlodipine, clonidine, lasix, lisinopril, metoprolol   Blood pressure well-controlled with his PTA medications at this time.     HLD  - continue PTA atorvastatin      Hyperparathyroidism  - Continue Sensipar        Clinically Significant Risk Factors                   # Hypertension:  Noted on problem list     # Dementia: noted on problem list            # Financial/Environmental Concerns: none         DVT Prophylaxis: Pneumatic Compression Devices  Code Status: Full Code      Medically Ready for Discharge: Ready Now patient medically stable , awaiting discharge to this facility    Armando Kern MD  [facility]  Contact information available via [facility] Paging/Directory        Interval History   Patient seen and evaluated in his room today, early this morning around 4 AM he was wondering in the hallway, they gave him Seroquel, at this point is sitting in the chair slightly drowsy trying to eat his breakfast.  He is taking his medications    Overnight events reviewed with the nursing staff taking care of the patient.      -Data reviewed today: I reviewed all new labs and imaging results over the last 24 hours. I personally reviewed no images or EKG's today.    Physical Exam   Temp: 98  F (36.7  C) Temp src: Axillary BP: (!) 141/81 Pulse: 85   Resp: 18 SpO2: 93 % O2 Device: None (Room air)    There were no vitals filed for this visit.  Vital Signs with Ranges  Temp:  [98  F (36.7  C)] 98  F (36.7  C)  Pulse:  [57-85] 85  Resp:  [18] 18  BP: (132-141)/(75-81) 141/81  SpO2:  [92 %-93 %] 93 %  I/O last 3 completed shifts:  In: 240 [P.O.:240]  Out: -     Constitutional: In comfortably in chair at this time,  Respiratory: No increased work of breathing, bilateral air entry equal  Cardiovascular: S1-S2 normal.  Skin/Integumen: No rash on exposed skin.    CNS: No focal deficit     Medications   Current Facility-Administered Medications   Medication Dose Route Frequency Provider Last Rate Last Admin     Current Facility-Administered Medications   Medication Dose Route Frequency Provider Last Rate Last Admin    amLODIPine (NORVASC) tablet 10 mg  10 mg Oral Daily Armando Kern MD   10 mg at 04/14/25 0837    atorvastatin (LIPITOR) tablet 40 mg  40 mg Oral Daily Armando Kern,  MD   40 mg at 04/13/25 2212    cinacalcet (SENSIPAR) tablet 30 mg  30 mg Oral Daily Armando Kern MD   30 mg at 04/14/25 0837    cloNIDine (CATAPRES) tablet 0.1 mg  0.1 mg Oral BID Armando Kern MD   0.1 mg at 04/14/25 0837    divalproex sodium extended-release (DEPAKOTE ER) 24 hr tablet 1,000 mg  1,000 mg Oral BID Eduardo Reza PA-C   1,000 mg at 04/13/25 2214    furosemide (LASIX) tablet 40 mg  40 mg Oral Daily Armando Kern MD   40 mg at 04/14/25 0837    lisinopril (ZESTRIL) tablet 40 mg  40 mg Oral Daily Armando Kern MD   40 mg at 04/14/25 0837    melatonin tablet 3 mg  3 mg Oral At Bedtime Oleksandr Kong MD   3 mg at 04/13/25 2213    metoprolol succinate ER (TOPROL XL) 24 hr tablet 50 mg  50 mg Oral Daily Armando Kern MD   50 mg at 04/14/25 0837    [Held by provider] OLANZapine (zyPREXA) tablet 10 mg  10 mg Oral BID Armando Kern MD   10 mg at 04/06/25 0858    [Held by provider] OLANZapine (zyPREXA) tablet 15 mg  15 mg Oral At Bedtime Armando Kern MD        QUEtiapine (SEROquel XR) 24 hr tablet 400 mg  400 mg Oral At Bedtime Eduardo Reza PA-C   400 mg at 04/13/25 2214    sennosides (SENOKOT) tablet 1 tablet  1 tablet Oral Daily Armando Kern MD   1 tablet at 04/14/25 0837       Data   No lab results found in last 7 days.      No results found for this or any previous visit (from the past 24 hours).

## 2025-04-14 NOTE — PROGRESS NOTES
Shift : 8843-1393.  PRIMARY Concern: Admitted for aggressive behavior at [facility]. History of Alzheimer's dementia with agitation  SAFETY RISK Concerns (fall risk, behaviors, etc.): Fall risk    Aggression Tool Color: Green   Tests/Procedures for NEXT shift: AM labs  Consults? (Pending/following, signed-off?) Psych seen and cleared for discharge back to facility, increased seroquel and depakote. SW following for safe discharge plan.  Where is patient from? (Home, TCU, etc.): [facility], [place].  Other Important info for NEXT shift: 1:1 sitter at bedside.  Anticipated DC date & active delays: Pending placement    SUMMARY NOTE:    Orientation/Cognitive: Alert to self only. Confused. Calm and slept on and off, woke up 2-3 times overnight and up in chair sleeping, refused to go bed.  Observation Goals (Met/ Not Met): Inpatient status   Mobility Level/Assist Equipment: SBA w/gb.  Antibiotics & Plan (IV/po, length of tx l ft): N/A  Pain Management: no signs of pain noted.   Tele/VS/O2:  Refused VS.  ABNL Lab/BG: None this shift    Diet: Regular   Bowel/Bladder: Cont/ incont @ times. No BM overnight.  Skin Concerns: scab at right shin  Drains/Devices: No PIV  Patient Stated Goal for Today: NARESH

## 2025-04-15 PROCEDURE — 99233 SBSQ HOSP IP/OBS HIGH 50: CPT | Performed by: STUDENT IN AN ORGANIZED HEALTH CARE EDUCATION/TRAINING PROGRAM

## 2025-04-15 PROCEDURE — 250N000013 HC RX MED GY IP 250 OP 250 PS 637: Performed by: HOSPITALIST

## 2025-04-15 PROCEDURE — 120N000001 HC R&B MED SURG/OB

## 2025-04-15 PROCEDURE — 250N000011 HC RX IP 250 OP 636

## 2025-04-15 PROCEDURE — 250N000013 HC RX MED GY IP 250 OP 250 PS 637: Performed by: PHYSICIAN ASSISTANT

## 2025-04-15 PROCEDURE — 250N000013 HC RX MED GY IP 250 OP 250 PS 637: Performed by: INTERNAL MEDICINE

## 2025-04-15 RX ADMIN — CLONIDINE HYDROCHLORIDE 0.1 MG: 0.1 TABLET ORAL at 21:30

## 2025-04-15 RX ADMIN — QUETIAPINE 400 MG: 300 TABLET, FILM COATED, EXTENDED RELEASE ORAL at 21:30

## 2025-04-15 RX ADMIN — QUETIAPINE FUMARATE 25 MG: 25 TABLET ORAL at 10:34

## 2025-04-15 RX ADMIN — QUETIAPINE FUMARATE 25 MG: 25 TABLET ORAL at 17:15

## 2025-04-15 RX ADMIN — OLANZAPINE 5 MG: 10 INJECTION, POWDER, FOR SOLUTION INTRAMUSCULAR at 02:36

## 2025-04-15 RX ADMIN — MELATONIN TAB 3 MG 3 MG: 3 TAB at 21:31

## 2025-04-15 RX ADMIN — DIVALPROEX SODIUM 1000 MG: 500 TABLET, FILM COATED, EXTENDED RELEASE ORAL at 21:31

## 2025-04-15 RX ADMIN — ATORVASTATIN CALCIUM 40 MG: 40 TABLET, FILM COATED ORAL at 21:31

## 2025-04-15 ASSESSMENT — ACTIVITIES OF DAILY LIVING (ADL)
ADLS_ACUITY_SCORE: 71
ADLS_ACUITY_SCORE: 62
ADLS_ACUITY_SCORE: 71
ADLS_ACUITY_SCORE: 62
ADLS_ACUITY_SCORE: 71
ADLS_ACUITY_SCORE: 62
ADLS_ACUITY_SCORE: 71
ADLS_ACUITY_SCORE: 62
ADLS_ACUITY_SCORE: 71
ADLS_ACUITY_SCORE: 62
ADLS_ACUITY_SCORE: 62
ADLS_ACUITY_SCORE: 71
ADLS_ACUITY_SCORE: 62
ADLS_ACUITY_SCORE: 62
ADLS_ACUITY_SCORE: 71

## 2025-04-15 NOTE — PROGRESS NOTES
[facility]  Hospitalist Progress Note    Assessment & Plan   Estevan Aaron is a 64 year old male with PMH of HTN, HLD, hyperparathyroidism, dementia who was admitted on 4/4/25 for aggressive behaviors at his care facility. Reportedly the facility has refused to take him back until seen by a provider and had medications adjusted.      Alzheimer's dementia with agitation  Reportedly aggressive behaviors at [facility]. Was calm and cooperative with staff in the ED  Overnight on the night of admission had events with the nursing staff when he tried to hit them. Was hospitalized in inpatient Psych from Oct 2024 through 2/24/25; appears he was discharged on oral Zyprexa. BMP/CBC unremarkable, UA not sent.    - Currently has a 1:1 sitter     - Please use PO PRN meds prior to IM meds    - Unclear why nursing has been giving IM meds vs trying PO      - Psych consulted and signed off 4/14/25    - Started on depakote 100mg BID    - Started on seroquel 400mg at bedtime    - Continue PRN Seroquel    - Recommend discharge     - SW working on discharge     HTN  - Continue PTA amlodipine, clonidine, lasix, lisinopril, metoprolol      HLD  - Continue PTA atorvastatin      Hyperparathyroidism  - Continue Sensipar    Clinically Significant Risk Factors                   # Hypertension: Noted on problem list     # Dementia: noted on problem list            # Financial/Environmental Concerns: none           Diet: Combination Diet Regular Diet; Safe Tray - NO utensils     DVT Prophylaxis: Ambulate every shift   Alberto Catheter: Not present  Lines: None     Cardiac Monitoring: None  Code Status: Full Code      Disposition Plan       Expected Discharge Date: 04/15/2025    Discharge Delays: 1:1 Sitter still ordered - MD to assess  Specialist Consult (enter specialist & decision needed in comments)  Destination: other (comment) (inpatient racheal psych or return to [facility]/)  Discharge Comments: sitter   Memory care  staff will come to assess pt this week.  PRN seroquel      Entered: Evelin Nava MD 04/15/2025, 11:46 AM     Notes Reviewed: Previous notes     Care Team Updated: SW updated     Disposition: Medically stable, awaiting safe discharge plan       Medically Ready for Discharge: Ready Now        Evelin Nava MD  Hospitalist Service   [facility]  Securely message with the Vocera Web Console (learn more here)         Medical Decision Making       60 MINUTES SPENT BY ME on the date of service doing chart review, history, exam, documentation & further activities per the note.           Interval History     Per nursing, patient had some increased agitation. He was Zyprexa at 230am.     This morning the patient was sitting in the chair. Had just urinated on the floor. Was working with staff to get changed.     -Data reviewed today: I reviewed all new labs and imaging results over the last 24 hours.     Physical Exam   Temp: 98.2  F (36.8  C) Temp src: Oral BP: (!) 153/95 Pulse: 82   Resp: 24 SpO2: 98 % O2 Device: None (Room air)    There were no vitals filed for this visit.  Vital Signs with Ranges  Temp:  [98.2  F (36.8  C)] 98.2  F (36.8  C)  Pulse:  [82] 82  Resp:  [24] 24  BP: (153)/(95) 153/95  SpO2:  [98 %] 98 %  I/O last 3 completed shifts:  In: 240 [P.O.:240]  Out: -       Constitutional: Awake, alert, cooperative  HEENT: PERRL, Normocephalic, without obvious abnormality, atraumatic, oral pharynx with moist mucus membranes  Pulmonary: No increased work of breathing, good air exchange, clear to auscultation bilaterally, no crackles or wheezing.  Cardiovascular: Regular rate and rhythm, normal S1 and S2  GI: Normal bowel sounds, soft, non-distended, non-tender.  Skin/Integumen: Visualized skin appeared clear.  Neuro: CN II-XII grossly intact.  Extremities: No lower extremity edema noted      Medications   Current Facility-Administered Medications   Medication Dose Route Frequency  Provider Last Rate Last Admin     Current Facility-Administered Medications   Medication Dose Route Frequency Provider Last Rate Last Admin    amLODIPine (NORVASC) tablet 10 mg  10 mg Oral Daily Armando Kern MD   10 mg at 04/14/25 0837    atorvastatin (LIPITOR) tablet 40 mg  40 mg Oral Daily Armando Kern MD   40 mg at 04/14/25 2036    cinacalcet (SENSIPAR) tablet 30 mg  30 mg Oral Daily Armando Kern MD   30 mg at 04/14/25 0837    cloNIDine (CATAPRES) tablet 0.1 mg  0.1 mg Oral BID Armando Kern MD   0.1 mg at 04/14/25 2036    divalproex sodium extended-release (DEPAKOTE ER) 24 hr tablet 1,000 mg  1,000 mg Oral BID Eduardo Reza PA-C   1,000 mg at 04/14/25 2036    furosemide (LASIX) tablet 40 mg  40 mg Oral Daily Armando Kern MD   40 mg at 04/14/25 0837    lisinopril (ZESTRIL) tablet 40 mg  40 mg Oral Daily Armando Kern MD   40 mg at 04/14/25 0837    melatonin tablet 3 mg  3 mg Oral At Bedtime Oleksandr Kong MD   3 mg at 04/14/25 2036    metoprolol succinate ER (TOPROL XL) 24 hr tablet 50 mg  50 mg Oral Daily Armando Kern MD   50 mg at 04/14/25 0837    QUEtiapine (SEROquel XR) 24 hr tablet 400 mg  400 mg Oral At Bedtime Eduardo Reza PA-C   400 mg at 04/14/25 2036    sennosides (SENOKOT) tablet 1 tablet  1 tablet Oral Daily Armando Kern MD   1 tablet at 04/14/25 0837       Data   No lab results found in last 7 days.    No results found for this or any previous visit (from the past 24 hours).

## 2025-04-15 NOTE — PROGRESS NOTES
PRIMARY Concern: Alzheimer's dementia with agitation  Tests/Procedures for NEXT shift: none   Consults? (Pending/following, signed-off?): Psych cleared pt for discharge   Safety Risk CONCERNS (fall risk, behaviors, etc.): fall risk      Where is patient from? (Home, TCU, etc.): [facility], [place]   Isolation/Type: none   Other Important info for next shift: PRN Seroquel PO x2 this shift, Code green called x1, pt attempted to enter other pt's room, difficultly redirecting at times; sitter at bedside; pt refused morning meds, spit pills out     Anticipated DC date & active delays: pending   SUMMARY NOTE:  Orientation/Cognitive: Alert to self only, confused   Observation Goals (Met/ Not Met): inpt   Mobility Level/Assist Equipment: SBA   Antibiotics & Plan (IV/po, length of tx left): none   Pain Management: denies pain   Tele/VS/O2: VSS on room air, elevated BP   ABNL Lab/BG: none   Diet: regular   Bowel/Bladder: incont at times   Skin Concerns: scab to right shin   Drains/Devices: no IV   Patient Stated Goal for Today: NARESH

## 2025-04-16 ENCOUNTER — DOCUMENTATION ONLY (OUTPATIENT)
Dept: OTHER | Facility: CLINIC | Age: 65
End: 2025-04-16
Payer: MEDICARE

## 2025-04-16 PROCEDURE — 250N000013 HC RX MED GY IP 250 OP 250 PS 637: Performed by: HOSPITALIST

## 2025-04-16 PROCEDURE — 250N000013 HC RX MED GY IP 250 OP 250 PS 637: Performed by: INTERNAL MEDICINE

## 2025-04-16 PROCEDURE — 99232 SBSQ HOSP IP/OBS MODERATE 35: CPT | Performed by: STUDENT IN AN ORGANIZED HEALTH CARE EDUCATION/TRAINING PROGRAM

## 2025-04-16 PROCEDURE — 250N000013 HC RX MED GY IP 250 OP 250 PS 637: Performed by: PHYSICIAN ASSISTANT

## 2025-04-16 PROCEDURE — 120N000001 HC R&B MED SURG/OB

## 2025-04-16 RX ADMIN — DIVALPROEX SODIUM 500 MG: 500 TABLET, FILM COATED, EXTENDED RELEASE ORAL at 08:55

## 2025-04-16 RX ADMIN — QUETIAPINE 400 MG: 300 TABLET, FILM COATED, EXTENDED RELEASE ORAL at 21:00

## 2025-04-16 RX ADMIN — QUETIAPINE FUMARATE 25 MG: 25 TABLET ORAL at 10:16

## 2025-04-16 RX ADMIN — ATORVASTATIN CALCIUM 40 MG: 40 TABLET, FILM COATED ORAL at 20:55

## 2025-04-16 RX ADMIN — SENNOSIDES 1 TABLET: 8.6 TABLET ORAL at 09:03

## 2025-04-16 RX ADMIN — DIVALPROEX SODIUM 1000 MG: 500 TABLET, FILM COATED, EXTENDED RELEASE ORAL at 20:55

## 2025-04-16 RX ADMIN — CLONIDINE HYDROCHLORIDE 0.1 MG: 0.1 TABLET ORAL at 20:54

## 2025-04-16 RX ADMIN — MELATONIN TAB 3 MG 3 MG: 3 TAB at 21:01

## 2025-04-16 ASSESSMENT — ACTIVITIES OF DAILY LIVING (ADL)
ADLS_ACUITY_SCORE: 71
ADLS_ACUITY_SCORE: 64
ADLS_ACUITY_SCORE: 71
ADLS_ACUITY_SCORE: 64
ADLS_ACUITY_SCORE: 71
ADLS_ACUITY_SCORE: 64
ADLS_ACUITY_SCORE: 71
ADLS_ACUITY_SCORE: 64
ADLS_ACUITY_SCORE: 71
ADLS_ACUITY_SCORE: 71
ADLS_ACUITY_SCORE: 64
ADLS_ACUITY_SCORE: 71
ADLS_ACUITY_SCORE: 64
ADLS_ACUITY_SCORE: 71

## 2025-04-16 NOTE — PROGRESS NOTES
PRIMARY Concern: Alzheimer's dementia with agitation  Tests/Procedures for NEXT shift: none   Consults? (Pending/following, signed-off?): SW following for placement  Safety Risk CONCERNS (fall risk, behaviors, etc.): fall risk      Where is patient from? (Home, TCU, etc.): [facility], [place]   Isolation/Type: none   Other Important info for next shift: PRN Seroquel PO x1 this shift pt attempted to enter other pt's room, difficultly redirecting at times; sitter at bedside; pt refused most morning meds  Anticipated DC date & active delays: pending   SUMMARY NOTE:  Orientation/Cognitive: Alert to self only, confused   Observation Goals (Met/ Not Met): inpt   Mobility Level/Assist Equipment: SBA   Antibiotics & Plan (IV/po, length of tx left): none   Pain Management: denies pain   Tele/VS/O2: VSS on RA. Often refuses vitals    ABNL Lab/BG: none   Diet: regular   Bowel/Bladder: incont at times   Skin Concerns: scab to right shin   Drains/Devices: no IV   Patient Stated Goal for Today: NARESH

## 2025-04-16 NOTE — PROGRESS NOTES
Care Management Follow Up    Length of Stay (days): 9    Expected Discharge Date: 04/17/2025     Concerns to be Addressed: discharge planning     Patient plan of care discussed at interdisciplinary rounds: Yes    Anticipated Discharge Disposition:                Anticipated Discharge Services:    Anticipated Discharge DME:      Patient/family educated on Medicare website which has current facility and service quality ratings:    Education Provided on the Discharge Plan:    Patient/Family in Agreement with the Plan: yes    Referrals Placed by CM/SW:    Private pay costs discussed: Not applicable    Discussed  Partnership in Safe Discharge Planning  document with patient/family: No     Handoff Completed: No, handoff not indicated or clinically appropriate    Additional Information:   received an email with the [employer] paper work for patient.  placed a call to Vera at [facility] now that it has been received.  spoke with Vera and she stated that the patient now needs an assessment. Vera stated that her nurse is out for the rest of the day today as well as tomorrow therefore would not be able to come until Friday. Vera stated that they need progress notes as well as any behavior/psych notes and a med list.     Vera explained that they are being extra diligent due to the behaviors that patient presented with while at the facility. Vera stated they cannot take him back if he still is having behaviors and trying to elope.  explained that while we understand there need to assess it is also a disservice to the patient to continue to stay in the hospital as that can often escalate people. Vera stated that the patient is likely the same in the hospital as he was at the facility.  validated Vera but explained that patient is medically ready  and we need to work towards a discharge plan, therefore we need to know either way if they will take the patient back or not.      faxed the  H&P, hospitalist progress notes, psych notes, and the last 48 hours of nursing notes as well as 3-day MAR to [phone].     Writer also forwarded guardian documentation to [facility]oring [employer] to get put on patients chart.     Next Steps: Get nurse to assess on Friday    ZOFIA RODRIGUEZ  [facility]  INPATIENT CARE COORDINATION

## 2025-04-16 NOTE — PROGRESS NOTES
PRIMARY Concern: Alzheimer's dementia with agitation  Tests/Procedures for NEXT shift: none   Consults? (Pending/following, signed-off?): Psych cleared pt for discharge   Safety Risk CONCERNS (fall risk, behaviors, etc.): fall risk      Where is patient from? (Home, TCU, etc.): [facility], [place]   Isolation/Type: none   Other Important info for next shift: Pt slept most of the night after taking his meds.      Anticipated DC date & active delays: TBD    SUMMARY NOTE:  Orientation/Cognitive: Alert to self only, agitation/aggressive towards staff  Observation Goals (Met/ Not Met): Inpt   Mobility Level/Assist Equipment: SBA, 1:1 sitter  Antibiotics & Plan (IV/po, length of tx left): none   Pain Management: No pain  Tele/VS/O2: VSS on room air, elevated BP   ABNL Lab/BG: none   Diet: regular   Bowel/Bladder: incont at times   Skin Concerns: scab to right shin   Drains/Devices: no IV   Patient Stated Goal for Today: NARESH

## 2025-04-16 NOTE — PROGRESS NOTES
Pt wondering the halls with support staff this morning. Around 0950 pt attempted to enter another pts room. Pt became upset with staffs attempts to intervene and redirect and attempted to push writer out of the way. Staff were eventually able to redirect pt back to his room. Pt was given PRN Seroquel at this time. No behavioral episodes since PRN given.

## 2025-04-16 NOTE — PROGRESS NOTES
[facility]  Hospitalist Progress Note    Assessment & Plan   Estevan Aaron is a 64 year old male with PMH of HTN, HLD, hyperparathyroidism, dementia who was admitted on 4/4/25 for aggressive behaviors at his care facility. Reportedly the facility has refused to take him back until seen by a provider and had medications adjusted.      Alzheimer's dementia with agitation  Reportedly aggressive behaviors at [facility]. Was calm and cooperative with staff in the ED  Overnight on the night of admission had events with the nursing staff when he tried to hit them. Was hospitalized in inpatient Psych from Oct 2024 through 2/24/25; appears he was discharged on oral Zyprexa. BMP/CBC unremarkable, UA not sent.     - Currently has a 1:1 sitter      - Please use PO PRN meds prior to IM meds      - Psych consulted and signed off 4/14/25                - Started on depakote 100mg BID                - Started on seroquel 400mg at bedtime                - Continue PRN Seroquel                - Recommend discharge      - SW working on discharge      HTN  - Continue PTA amlodipine, clonidine, lasix, lisinopril, metoprolol    - Had normal BP on 4/16/25: plan to give Metoprolol and hold the rest      HLD  - Continue PTA atorvastatin      Hyperparathyroidism  - Continue Sensipar    Clinically Significant Risk Factors                   # Hypertension: Noted on problem list     # Dementia: noted on problem list            # Financial/Environmental Concerns: none           Diet: Combination Diet Regular Diet; Safe Tray - NO utensils     DVT Prophylaxis: Ambulate every shift   Alberto Catheter: Not present  Lines: None     Cardiac Monitoring: None  Code Status: Full Code      Disposition Plan       Expected Discharge Date: 04/17/2025    Discharge Delays: 1:1 Sitter still ordered - MD to assess  Specialist Consult (enter specialist & decision needed in comments)  Destination: other (comment) (inpatient racheal psych or  return to [facility]/)  Discharge Comments: sitter   Memory care staff will come to assess pt this week.  PRN seroquel      Entered: Evelin Nava MD 04/16/2025, 12:07 PM     Care Team Updated: Nursing and SW updated     Disposition: Medically stable, awaiting safe discharge plan       Medically Ready for Discharge: Ready Now        Evelin Nava MD  Hospitalist Service   [facility]  Securely message with the Vocera Web Console (learn more here)         Medical Decision Making       35 MINUTES SPENT BY ME on the date of service doing chart review, history, exam, documentation & further activities per the note.           Interval History     No acute overnight events.    Per nursing patient slept through the night.     The patient was seen walking the halls with his sitter. Calm and cooperative.     -Data reviewed today: I reviewed all new labs and imaging results over the last 24 hours.    Physical Exam   Temp: 98  F (36.7  C) Temp src: Axillary BP: 127/80 Pulse: 96   Resp: 18 SpO2: 97 % O2 Device: None (Room air)    There were no vitals filed for this visit.  Vital Signs with Ranges  Temp:  [97.9  F (36.6  C)-98  F (36.7  C)] 98  F (36.7  C)  Pulse:  [89-96] 96  Resp:  [16-20] 18  BP: (127-161)/(80-95) 127/80  SpO2:  [94 %-97 %] 97 %  No intake/output data recorded.      Constitutional: Awake, alert, cooperative, no apparent distress.   HEENT: PERRL, Normocephalic, without obvious abnormality, atraumatic, oral pharynx with moist mucus membranes  Pulmonary: No increased work of breathing, good air exchange, clear to auscultation bilaterally, no crackles or wheezing.  Cardiovascular: Regular rate and rhythm, normal S1 and S2  GI: Normal bowel sounds, soft, non-distended, non-tender.  Skin/Integumen: Visualized skin appeared clear.  Psych:  Confused   Extremities: Mild edema present       Medications   Current Facility-Administered Medications   Medication Dose Route Frequency  Provider Last Rate Last Admin     Current Facility-Administered Medications   Medication Dose Route Frequency Provider Last Rate Last Admin    amLODIPine (NORVASC) tablet 10 mg  10 mg Oral Daily Armando Kern MD   10 mg at 04/14/25 0837    atorvastatin (LIPITOR) tablet 40 mg  40 mg Oral Daily Armando Kern MD   40 mg at 04/15/25 2131    cinacalcet (SENSIPAR) tablet 30 mg  30 mg Oral Daily Armando Kern MD   30 mg at 04/14/25 0837    cloNIDine (CATAPRES) tablet 0.1 mg  0.1 mg Oral BID Armando Kern MD   0.1 mg at 04/15/25 2130    divalproex sodium extended-release (DEPAKOTE ER) 24 hr tablet 1,000 mg  1,000 mg Oral BID Eduardo Reza PA-C   500 mg at 04/16/25 0855    furosemide (LASIX) tablet 40 mg  40 mg Oral Daily Armando Kern MD   40 mg at 04/14/25 0837    lisinopril (ZESTRIL) tablet 40 mg  40 mg Oral Daily Armando Kern MD   40 mg at 04/14/25 0837    melatonin tablet 3 mg  3 mg Oral At Bedtime Oleksandr Kong MD   3 mg at 04/15/25 2131    metoprolol succinate ER (TOPROL XL) 24 hr tablet 50 mg  50 mg Oral Daily Armando Kern MD   50 mg at 04/14/25 0837    QUEtiapine (SEROquel XR) 24 hr tablet 400 mg  400 mg Oral At Bedtime Eduardo Reza PA-C   400 mg at 04/15/25 2130    sennosides (SENOKOT) tablet 1 tablet  1 tablet Oral Daily Armando Kern MD   1 tablet at 04/16/25 0903       Data   No lab results found in last 7 days.    No results found for this or any previous visit (from the past 24 hours).

## 2025-04-16 NOTE — PROGRESS NOTES
Care Management Follow Up    Length of Stay (days): 9    Expected Discharge Date: 04/17/2025     Concerns to be Addressed: discharge planning     Patient plan of care discussed at interdisciplinary rounds: Yes    Anticipated Discharge Disposition:                Anticipated Discharge Services:    Anticipated Discharge DME:      Patient/family educated on Medicare website which has current facility and service quality ratings:    Education Provided on the Discharge Plan:    Patient/Family in Agreement with the Plan: yes    Referrals Placed by CM/SW:    Private pay costs discussed: Not applicable    Discussed  Partnership in Safe Discharge Planning  document with patient/family: No     Handoff Completed: No, handoff not indicated or clinically appropriate    Additional Information:  Writer placed call to [facility] at [phone]. Writer made initial intake report related to patient not being able to come back to [facility]. Intake will pass this along to [place] for the [facility]. They will call writer or covering CC/SW back.    Next Steps: follow for discharge planning.    Kaur Valenzuela RN

## 2025-04-17 LAB — VALPROATE SERPL-MCNC: 64.4 UG/ML

## 2025-04-17 PROCEDURE — 250N000013 HC RX MED GY IP 250 OP 250 PS 637: Performed by: INTERNAL MEDICINE

## 2025-04-17 PROCEDURE — 99232 SBSQ HOSP IP/OBS MODERATE 35: CPT | Performed by: PHYSICIAN ASSISTANT

## 2025-04-17 PROCEDURE — 250N000011 HC RX IP 250 OP 636: Mod: JW | Performed by: STUDENT IN AN ORGANIZED HEALTH CARE EDUCATION/TRAINING PROGRAM

## 2025-04-17 PROCEDURE — 120N000001 HC R&B MED SURG/OB

## 2025-04-17 PROCEDURE — 80164 ASSAY DIPROPYLACETIC ACD TOT: CPT | Performed by: PHYSICIAN ASSISTANT

## 2025-04-17 PROCEDURE — 99232 SBSQ HOSP IP/OBS MODERATE 35: CPT | Performed by: STUDENT IN AN ORGANIZED HEALTH CARE EDUCATION/TRAINING PROGRAM

## 2025-04-17 PROCEDURE — 36415 COLL VENOUS BLD VENIPUNCTURE: CPT | Performed by: PHYSICIAN ASSISTANT

## 2025-04-17 PROCEDURE — 250N000013 HC RX MED GY IP 250 OP 250 PS 637: Performed by: HOSPITALIST

## 2025-04-17 PROCEDURE — 250N000013 HC RX MED GY IP 250 OP 250 PS 637: Performed by: PHYSICIAN ASSISTANT

## 2025-04-17 RX ORDER — QUETIAPINE FUMARATE 50 MG/1
150 TABLET, EXTENDED RELEASE ORAL EVERY MORNING
Status: DISCONTINUED | OUTPATIENT
Start: 2025-04-17 | End: 2025-04-21

## 2025-04-17 RX ADMIN — QUETIAPINE 400 MG: 300 TABLET, FILM COATED, EXTENDED RELEASE ORAL at 23:19

## 2025-04-17 RX ADMIN — DIVALPROEX SODIUM 1000 MG: 500 TABLET, FILM COATED, EXTENDED RELEASE ORAL at 09:36

## 2025-04-17 RX ADMIN — QUETIAPINE 150 MG: 50 TABLET, FILM COATED, EXTENDED RELEASE ORAL at 11:21

## 2025-04-17 RX ADMIN — OLANZAPINE 5 MG: 10 INJECTION, POWDER, FOR SOLUTION INTRAMUSCULAR at 19:56

## 2025-04-17 RX ADMIN — QUETIAPINE FUMARATE 25 MG: 25 TABLET ORAL at 08:56

## 2025-04-17 RX ADMIN — QUETIAPINE FUMARATE 25 MG: 25 TABLET ORAL at 18:18

## 2025-04-17 ASSESSMENT — ACTIVITIES OF DAILY LIVING (ADL)
ADLS_ACUITY_SCORE: 64
ADLS_ACUITY_SCORE: 59
ADLS_ACUITY_SCORE: 64
ADLS_ACUITY_SCORE: 59
ADLS_ACUITY_SCORE: 64
ADLS_ACUITY_SCORE: 59
ADLS_ACUITY_SCORE: 59
ADLS_ACUITY_SCORE: 64
ADLS_ACUITY_SCORE: 59
ADLS_ACUITY_SCORE: 59
ADLS_ACUITY_SCORE: 64
ADLS_ACUITY_SCORE: 59
ADLS_ACUITY_SCORE: 64
ADLS_ACUITY_SCORE: 64

## 2025-04-17 NOTE — PROGRESS NOTES
Care Management Follow Up    Length of Stay (days): 10    Expected Discharge Date: 04/18/2025     Concerns to be Addressed: discharge planning     Patient plan of care discussed at interdisciplinary rounds: Yes    Anticipated Discharge Disposition:  Memory care     Anticipated Discharge Services:    Anticipated Discharge DME:      Patient/family educated on Medicare website which has current facility and service quality ratings:    Education Provided on the Discharge Plan:    Patient/Family in Agreement with the Plan: yes    Referrals Placed by CM/SW:    Private pay costs discussed: Not applicable    Discussed  Partnership in Safe Discharge Planning  document with patient/family: No     Handoff Completed: No, handoff not indicated or clinically appropriate    Additional Information:  Received e-mail from Shelby with [facility]:   I just wanted to give a heads-up that I was informed Santos Aaron will have court on Monday, for his extension hearing (regarding my request to extend his commitment order). Probate should be reaching out today or tomorrow to arrange a specific time and providing you with information to connect (since hearings are virtual). Last I heard, you were trying to complete a nurse-to-nurse to see if Santos can return to [facility]. I m not sure if that has happened yet. Please make sure to keep me updated on ETA for discharge and if there is a possibility that he leave before Monday, Please make sure to let me know so I can keep the court updated on how to reach him. If you end up considering discharge for Monday, please try to push for later in the afternoon, since Court should be scheduled for the morning, and it will be easier to help him connect while he is still admitted.  E-mailed Shelby back and stated that it sounds that [facility] will be coming out on Friday to assess patient for a return, so no set discharge time yet.     Spoke with Vivian with [facility] (ph: [phone]) to explain situation.  Vivian states that what [facility] is doing is illegal, as they have a formal eviction process they need to follow, and [place] is legally patient's home to return to. Vivian states even pushing the assessment to Friday with the thought that pt then can't return on the weekend, is not right. Vivian states because they are resident-based, they do need to talk with patient to confirm pt is agreeable for them to help, but understand there are cognitive needs and pt may not be able t to answer appropriately.  Vivian would want SW to put her on speaker phone so she can ask pt a couple questions. Spoke with patient in room and introduced self and Vivian, stated both DEBORAH and Vivian are trying to help pt return to their home, pt was agreeable to talk to her. Pt did give Vivian verbal consent to talk with their guardian Clifford. Vivian confirms she will be calling Clifford as well as Vera with [facility].     Addendum 1550: Vivian with [facility] called SW and asked if they've talked to Vera, she has not been able to get a hold of her today. Informed Vivian that Vera is in the e-mail thread with all the workers and she has not replied to that today. Vivian requested Vera's e-mail. Texted Vivian this information. Vivian responded stating she is e-mailing Vera informing the expectation is that patient return to their memory care tomorrow, 4/18.   Tentative [facility] stretcher transport set for 4/18 between 1114-7862 in case pt is able to return. DEBORAH will need to cancel this if plan not in place by that time.    [facility] Abrams, ZOFIA  Social Work  [facility]

## 2025-04-17 NOTE — PROGRESS NOTES
[facility]  Hospitalist Progress Note    Assessment & Plan   Estevan Aaron is a 64 year old male with PMH of HTN, HLD, hyperparathyroidism, dementia who was admitted on 4/4/25 for aggressive behaviors at his care facility. Reportedly the facility has refused to take him back until seen by a provider and had medications adjusted.      Alzheimer's Dementia with Agitation  Reportedly aggressive behaviors at [facility]. Was calm and cooperative with staff in the ED  Overnight on the night of admission had events with the nursing staff when he tried to hit them. Was hospitalized in inpatient Psych from Oct 2024 through 2/24/25; appears he was discharged on oral Zyprexa. BMP/CBC unremarkable, UA not sent.     - Currently has a 1:1 sitter      - Please use PO PRN meds prior to IM meds      - Psych consulted and signed off 4/17/25    - Repeat Depakote level               - Continue Depakote 100mg BID                - Continue Seroquel 400mg at bedtime    - Start Seroquel 150mg QAM                - Continue PRN Seroquel  - Recommend SLP evaluation for appropriate communication aids/strategies                 - Recommend discharge      - SW working on discharge      HTN  - Continue PTA amlodipine, clonidine, lasix, lisinopril, metoprolol      HLD  - Continue PTA atorvastatin      Hyperparathyroidism  - Continue Sensipar    Clinically Significant Risk Factors                   # Hypertension: Noted on problem list     # Dementia: noted on problem list            # Financial/Environmental Concerns: none           Diet: Combination Diet Regular Diet; Safe Tray - NO utensils  Room Service     DVT Prophylaxis: Ambulate every shift   Alberto Catheter: Not present  Lines: None     Cardiac Monitoring: None  Code Status: Full Code      Disposition Plan       Expected Discharge Date: 04/18/2025    Discharge Delays: 1:1 Sitter still ordered - MD to assess  Specialist Consult (enter specialist & decision needed  in comments)  Destination: other (comment) (inpatient racheal psych or return to [facility]/)  Discharge Comments: sitter   Memory care staff will come to assess pt this week.  PRN seroquel      Entered: Evelin Nava MD 04/17/2025, 1:30 PM     Notes Reviewed: Psych    Family Updated: Spoke to Clifford via phone on 4/17/25     Care Team Updated: Nursing and SW Updated     Disposition: Patient is medically ready for discharge. Awaiting a safe discharge plan       Medically Ready for Discharge: Ready Now        Evelin Nava MD  Hospitalist Service   [facility]  Securely message with the Vocera Web Console (learn more here)         Medical Decision Making       35 MINUTES SPENT BY ME on the date of service doing chart review, history, exam, documentation & further activities per the note.           Interval History     No acute overnight events.    This morning the patient was sitting in his chair eating pudding. Minimally interactive with provider. Sitter states the patient does better with males.     -Data reviewed today: I reviewed all new labs and imaging results over the last 24 hours.     Physical Exam                      There were no vitals filed for this visit.  Vital Signs with Ranges     I/O last 3 completed shifts:  In: 300 [P.O.:300]  Out: -       Constitutional: Awake, alert, cooperative, no apparent distress.   HEENT: PERRL, Normocephalic, without obvious abnormality, atraumatic, oral pharynx with moist mucus membranes  Pulmonary: No increased work of breathing, good air exchange, clear to auscultation bilaterally, no crackles or wheezing.  Cardiovascular: Regular rate and rhythm, normal S1 and S2  GI: Normal bowel sounds, soft, non-distended, non-tender.  Skin/Integumen: Visualized skin appeared clear.  Psych:  Alert to self.   Extremities: Mild edema present       Medications   Current Facility-Administered Medications   Medication Dose Route Frequency Provider Last  Rate Last Admin     Current Facility-Administered Medications   Medication Dose Route Frequency Provider Last Rate Last Admin    amLODIPine (NORVASC) tablet 10 mg  10 mg Oral Daily Armando Kern MD   10 mg at 04/14/25 0837    atorvastatin (LIPITOR) tablet 40 mg  40 mg Oral Daily Armando Kern MD   40 mg at 04/16/25 2055    cinacalcet (SENSIPAR) tablet 30 mg  30 mg Oral Daily Armando Kern MD   30 mg at 04/14/25 0837    cloNIDine (CATAPRES) tablet 0.1 mg  0.1 mg Oral BID Armando Kern MD   0.1 mg at 04/16/25 2054    divalproex sodium extended-release (DEPAKOTE ER) 24 hr tablet 1,000 mg  1,000 mg Oral BID Eduardo Reza PA-C   1,000 mg at 04/17/25 0936    furosemide (LASIX) tablet 40 mg  40 mg Oral Daily Armando Kern MD   40 mg at 04/14/25 0837    lisinopril (ZESTRIL) tablet 40 mg  40 mg Oral Daily Armando Kern MD   40 mg at 04/14/25 0837    melatonin tablet 3 mg  3 mg Oral At Bedtime Oleksandr Kong MD   3 mg at 04/16/25 2101    metoprolol succinate ER (TOPROL XL) 24 hr tablet 50 mg  50 mg Oral Daily Armando Kern MD   50 mg at 04/14/25 0837    QUEtiapine (SEROquel XR) 24 hr tablet 400 mg  400 mg Oral At Bedtime Eduardo Reza PA-C        And    QUEtiapine (SEROquel XR) 24 hr tablet 150 mg  150 mg Oral QAM Eduardo Reza PA-C   150 mg at 04/17/25 1121    sennosides (SENOKOT) tablet 1 tablet  1 tablet Oral Daily Armando Kern MD   1 tablet at 04/16/25 0903       Data   No lab results found in last 7 days.    No results found for this or any previous visit (from the past 24 hours).

## 2025-04-17 NOTE — PLAN OF CARE
Shift: [id number]    Cognition/Mentation/Neuro: Only oriented to self. Irritable and agitated this morning- PRN Seroquel x1.   VS: Refused vital signs.  GI/: Incontinent   Pulmonary: Breathing unlabored and regular   Pain: No nonverbal signs of pain  Drains/Lines: No IV, MD aware  Skin: Scattered bruises    Activity: Ind.  Diet: Regular   Discharge: Pending placement     Shift summary: Increased scheduled Seroquel to 2x daily. 1:1 at bedside for safety.

## 2025-04-17 NOTE — PLAN OF CARE
PRIMARY Concern: Alzheimer's dementia with agitation  Tests/Procedures for NEXT shift: none   Consults? (Pending/following, signed-off?): SW following for placement  Safety Risk CONCERNS (fall risk, behaviors, etc.): fall risk      Where is patient from? (Home, TCU, etc.): [facility], [place]   Isolation/Type: none   Other Important info for next shift:   Anticipated DC date & active delays: pending     SUMMARY NOTE:  Orientation/Cognitive: Alert to self only, confused   Observation Goals (Met/ Not Met): inpt   Mobility Level/Assist Equipment: SBA   Antibiotics & Plan (IV/po, length of tx left): none   Pain Management: denies pain   Tele/VS/O2: VSS on RA. Often refuses vitals              ABNL Lab/BG: none   Diet: regular   Bowel/Bladder: incont at times   Skin Concerns: scab to right shin   Drains/Devices: no IV   Patient Stated Goal for Today: NARESH

## 2025-04-17 NOTE — CONSULTS
Psychiatry Consultation; Follow up              Reason for Consult, requesting source:      Requesting source: Oleksandr Kong    Labs and imaging reviewed,     Total time spent in chart review, patient interview and coordination of care;            Interim history:    Today antionette remains much the same as previous interactions. Displays minimal comprehension of conversation. Great deal of word finding difficulty. Is not able to express his goals, or to adjust to environmental information. Patient is observed in bedside chair attempting to move side table out of way. Is not able to request help when table gets stuck several times. Does accept assistance. Then patient begin struggling with arm of chair, Is not able to express his goal. Agrees that he wants to recline in the chair when asked. Is not able to engage with redirection towards the other arm of chair where the recline lever is located. Does say thank you when writer reclined chair for him.         Current Medications:     Current Facility-Administered Medications   Medication Dose Route Frequency Provider Last Rate Last Admin    amLODIPine (NORVASC) tablet 10 mg  10 mg Oral Daily Armando Kern MD   10 mg at 04/14/25 0837    atorvastatin (LIPITOR) tablet 40 mg  40 mg Oral Daily Armando Kern MD   40 mg at 04/16/25 2055    cinacalcet (SENSIPAR) tablet 30 mg  30 mg Oral Daily Armando Kern MD   30 mg at 04/14/25 0837    cloNIDine (CATAPRES) tablet 0.1 mg  0.1 mg Oral BID Armando Kern MD   0.1 mg at 04/16/25 2054    divalproex sodium extended-release (DEPAKOTE ER) 24 hr tablet 1,000 mg  1,000 mg Oral BID Eduardo Reza PA-C   1,000 mg at 04/16/25 2055    furosemide (LASIX) tablet 40 mg  40 mg Oral Daily Armando Kern MD   40 mg at 04/14/25 0837    lisinopril (ZESTRIL) tablet 40 mg  40 mg Oral Daily Armando Kern MD   40 mg at 04/14/25 0837    melatonin tablet 3 mg  3 mg Oral At Bedtime Oleksandr Kong MD   3 mg at 04/16/25  "2101    metoprolol succinate ER (TOPROL XL) 24 hr tablet 50 mg  50 mg Oral Daily Armando Kern MD   50 mg at 04/14/25 0837    QUEtiapine (SEROquel XR) 24 hr tablet 400 mg  400 mg Oral At Bedtime Eduardo Reza PA-C        And    QUEtiapine (SEROquel XR) 24 hr tablet 150 mg  150 mg Oral QAM Eduardo Reza PA-C        sennosides (SENOKOT) tablet 1 tablet  1 tablet Oral Daily Armando Kern MD   1 tablet at 04/16/25 0903              MSE:   Appearance: awake, alert  Attitude:  somewhat cooperative  Eye Contact:  poor   Mood:  \"fair\"  Affect:  slightly restricted  Speech:  mumbling  Psychomotor Behavior:  no evidence of tardive dyskinesia, dystonia, or tics  Muscle strength and tone: intact   Thought Process:  goal oriented  Associations:  no loose associations  Thought Content:  no evidence of suicidal ideation or homicidal ideation  Insight:  partial  Judgement:  limited  Oriented to:   Person  Attention Span and Concentration:  poor  Recent and Remote Memory:  poor    Vital signs:                         Estimated body mass index is 40.9 kg/m  as calculated from the following:    Height as of 10/10/24: 1.676 m (5' 6\").    Weight as of 2/23/25: 114.9 kg (253 lb 6.4 oz).    Qtc:          DSM-5 Diagnosis:   Dementia with behavior disturbance          Assessment:   Patient continues to display poor working memory, word finding difficulty, poor frustration tolerance, and minimal ability to comprehend redirection. This is most likely a direct result of his cognitive decline, and will not be much improved with hospitalization.   Since admission here he has been started on therapeutic doses of Seroquel and Depakote. Will repeat Depakote level today. Will add AM dose of seroquel given that PRNs have been required fairly consistently.     Also recommend Speech consult, as communication difficulties seem to underpin episodes of agitation. Having appropriate communication aids/strategies available may be effective in " "mitigating the agitation.           Summary of Recommendations:   Repeat Depakote level.   Increase seroquel. Add 150mg QAM dose in addition to 400mg at bedtime  Recommend SLP evaluation for appropriate communication aids/strategies      \"Much or all of the text in this note was generated through the use of Dragon Dictate voice to text software. Errors in spelling or words which appear to be out of contact are unintentional, may be present due having escaped editing\"           "

## 2025-04-18 ENCOUNTER — APPOINTMENT (OUTPATIENT)
Dept: SPEECH THERAPY | Facility: CLINIC | Age: 65
End: 2025-04-18
Attending: STUDENT IN AN ORGANIZED HEALTH CARE EDUCATION/TRAINING PROGRAM
Payer: MEDICARE

## 2025-04-18 PROCEDURE — 250N000013 HC RX MED GY IP 250 OP 250 PS 637: Performed by: PHYSICIAN ASSISTANT

## 2025-04-18 PROCEDURE — 250N000013 HC RX MED GY IP 250 OP 250 PS 637: Performed by: INTERNAL MEDICINE

## 2025-04-18 PROCEDURE — 120N000001 HC R&B MED SURG/OB

## 2025-04-18 PROCEDURE — 99232 SBSQ HOSP IP/OBS MODERATE 35: CPT | Performed by: STUDENT IN AN ORGANIZED HEALTH CARE EDUCATION/TRAINING PROGRAM

## 2025-04-18 PROCEDURE — 92523 SPEECH SOUND LANG COMPREHEN: CPT | Mod: GN | Performed by: REHABILITATION PRACTITIONER

## 2025-04-18 PROCEDURE — 250N000013 HC RX MED GY IP 250 OP 250 PS 637: Performed by: STUDENT IN AN ORGANIZED HEALTH CARE EDUCATION/TRAINING PROGRAM

## 2025-04-18 PROCEDURE — 250N000011 HC RX IP 250 OP 636: Performed by: STUDENT IN AN ORGANIZED HEALTH CARE EDUCATION/TRAINING PROGRAM

## 2025-04-18 PROCEDURE — 250N000013 HC RX MED GY IP 250 OP 250 PS 637: Performed by: HOSPITALIST

## 2025-04-18 RX ORDER — QUETIAPINE 400 MG/1
400 TABLET, FILM COATED, EXTENDED RELEASE ORAL AT BEDTIME
OUTPATIENT
Start: 2025-04-18

## 2025-04-18 RX ORDER — QUETIAPINE 150 MG/1
150 TABLET, FILM COATED, EXTENDED RELEASE ORAL EVERY MORNING
OUTPATIENT
Start: 2025-04-18

## 2025-04-18 RX ORDER — DIVALPROEX SODIUM 500 MG/1
1000 TABLET, FILM COATED, EXTENDED RELEASE ORAL EVERY 12 HOURS SCHEDULED
Status: DISCONTINUED | OUTPATIENT
Start: 2025-04-18 | End: 2025-04-28

## 2025-04-18 RX ORDER — DIVALPROEX SODIUM 500 MG/1
1000 TABLET, FILM COATED, EXTENDED RELEASE ORAL 2 TIMES DAILY
OUTPATIENT
Start: 2025-04-18

## 2025-04-18 RX ORDER — DIVALPROEX SODIUM 125 MG/1
1000 CAPSULE, COATED PELLETS ORAL EVERY 12 HOURS SCHEDULED
Status: DISCONTINUED | OUTPATIENT
Start: 2025-04-18 | End: 2025-04-28

## 2025-04-18 RX ORDER — QUETIAPINE FUMARATE 25 MG/1
25 TABLET, FILM COATED ORAL 3 TIMES DAILY PRN
OUTPATIENT
Start: 2025-04-18

## 2025-04-18 RX ORDER — CINACALCET 30 MG/1
30 TABLET, FILM COATED ORAL DAILY
Status: DISCONTINUED | OUTPATIENT
Start: 2025-04-19 | End: 2025-04-27

## 2025-04-18 RX ADMIN — ATORVASTATIN CALCIUM 40 MG: 40 TABLET, FILM COATED ORAL at 19:45

## 2025-04-18 RX ADMIN — CLONIDINE HYDROCHLORIDE 0.1 MG: 0.1 TABLET ORAL at 09:06

## 2025-04-18 RX ADMIN — LISINOPRIL 40 MG: 40 TABLET ORAL at 09:05

## 2025-04-18 RX ADMIN — AMLODIPINE BESYLATE 10 MG: 10 TABLET ORAL at 09:06

## 2025-04-18 RX ADMIN — DIVALPROEX SODIUM 1000 MG: 500 TABLET, FILM COATED, EXTENDED RELEASE ORAL at 19:46

## 2025-04-18 RX ADMIN — QUETIAPINE FUMARATE 25 MG: 25 TABLET ORAL at 18:56

## 2025-04-18 RX ADMIN — QUETIAPINE 150 MG: 50 TABLET, FILM COATED, EXTENDED RELEASE ORAL at 09:05

## 2025-04-18 RX ADMIN — DIVALPROEX SODIUM 1000 MG: 125 CAPSULE, COATED PELLETS ORAL at 14:34

## 2025-04-18 RX ADMIN — OLANZAPINE 5 MG: 10 INJECTION, POWDER, FOR SOLUTION INTRAMUSCULAR at 19:43

## 2025-04-18 RX ADMIN — QUETIAPINE FUMARATE 25 MG: 25 TABLET ORAL at 14:34

## 2025-04-18 RX ADMIN — CLONIDINE HYDROCHLORIDE 0.1 MG: 0.1 TABLET ORAL at 19:46

## 2025-04-18 RX ADMIN — QUETIAPINE 400 MG: 300 TABLET, FILM COATED, EXTENDED RELEASE ORAL at 21:29

## 2025-04-18 RX ADMIN — MELATONIN TAB 3 MG 3 MG: 3 TAB at 21:29

## 2025-04-18 ASSESSMENT — ACTIVITIES OF DAILY LIVING (ADL)
ADLS_ACUITY_SCORE: 64
ADLS_ACUITY_SCORE: 66
ADLS_ACUITY_SCORE: 64
ADLS_ACUITY_SCORE: 65
ADLS_ACUITY_SCORE: 64
ADLS_ACUITY_SCORE: 59
ADLS_ACUITY_SCORE: 64
ADLS_ACUITY_SCORE: 66
ADLS_ACUITY_SCORE: 65
ADLS_ACUITY_SCORE: 64
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
## 2025-04-18 NOTE — PLAN OF CARE
PRIMARY Concern: Dementia w/ aggression  SAFETY RISK Concerns (fall risk, behaviors, etc.): Behavior- Yellow      Aggression Tool Color: Yellow  Isolation/Type: n/a  Tests/Procedures for NEXT shift: n/a  Consults? (Pending/following, signed-off?)  coordinates pending discharge disposition  Where is patient from? (Home, TCU, etc.): [facility]- [facility]  Other Important info for NEXT shift: none  Anticipated DC date & active delays: TBD- Pending placement back to facility, nurse from facility will come this Friday to assess    SUMMARY NOTE:  Orientation/Cognitive: Alert- Confused  Observation Goals (Met/ Not Met): Inpatient  Mobility Level/Assist Equipment: Ind/SBA w/ supervision  Antibiotics & Plan (IV/po, length of tx left): n/a  Pain Management: No  Complete Pain Reassessment: Y/N N Due next: Next shift  Tele/VS/O2: Refusing vitals  ABNL Lab/BG: No current labs  Diet: Reg, safe tray, no utensils  Bowel/Bladder: Incont @ times  Skin Concerns: Scab to lower extremity  Drains/Devices: No IV access  Patient Stated Goal for Today: n/a

## 2025-04-18 NOTE — PROGRESS NOTES
Care Management Follow Up    Length of Stay (days): 11    Expected Discharge Date: 04/18/2025     Concerns to be Addressed: discharge planning     Patient plan of care discussed at interdisciplinary rounds: Yes    Anticipated Discharge Disposition:  [facility]     Anticipated Discharge Services:    Anticipated Discharge DME:      Patient/family educated on Medicare website which has current facility and service quality ratings:    Education Provided on the Discharge Plan:    Patient/Family in Agreement with the Plan: yes    Referrals Placed by CM/SW:    Private pay costs discussed: transportation costs    Discussed  Partnership in Safe Discharge Planning  document with patient/family: No     Handoff Completed: No, handoff not indicated or clinically appropriate    Additional Information:  Received call from Vera at [facility]. The RN, Kimber, ph: [phone], will be coming to assess patient this afternoon (probably around [phone]). Vera states they are making active changes to hopefully see some improvements from pt, such as moving their room away from the exit door, and the RN will create an updated care plan after they assess today. Vera has been in touch with guardijessica Horton to start these changes. They can accept patient back on Monday, hoping for late morning/early afternoon. Vera states it's probably best that the medications get filled at the hospital so that pt has them right away upon returning with no delays. Vera confirmed they receive an e-mail from the [facility] that informed they are out of statute, and Vera states they have just been trying to figure things out to make it a safer plan for pt.   Ofelia LATHAM Fax: [phone]    Spoke with Vivian with [facility], she talked with Vera as well and understands the plan. Vivian states she is available if anything else comes up.     Discussed plans with hospitalist. Plan is to start prepping medications  on Sunday for a discharge Monday.    Received call from [facility], pt has a court hearing on Monday for continuation of commitment. It is at 9:00am virtually. SW will need to assist with providing laptop in pt's room so pt can be a part of the hearing. The meeting will be about 30 minutes.   [id number]  6-month Review Hrg  Commitment Court  Join Video Recruit Meeting  [email or web address]   Meeting ID:   Passcode: [id number]  Pt is not required to attend, if too agitated the court will just want staff to explain this, so there's confirmation as to why pt isn t able to join. If pt doesn t want to attend, the court may call the unit anyway, just to get an updated statement confirming this is the case, and then they will proceed without pt.    Scheduled [facility] stretcher transport for Monday 4/21 between 0851-6643. PCS completed.     Spoke with guardian Clifford to update on all plans. She is in agreement with using stretcher transport and understands that any costs would get billed later, that insurance doesn't cover. Clifford wanted to reiterate that pt will discharge with more than just one oral med, that is what happened last time. Confirmed with Clifford that DEBORAH discussed this with hospitalist.     Ofelia Abrams, ZOFIA  Social Work  [facility]

## 2025-04-18 NOTE — PROGRESS NOTES
[id number]    PRIMARY Concern: Dementia w/ aggression  SAFETY RISK Concerns (fall risk, behaviors, etc.): Behavior- Yellow      Aggression Tool Color: Yellow  Isolation/Type: n/a  Tests/Procedures for NEXT shift: n/a  Consults? (Pending/following, signed-off?) Psychiatry signed off  Where is patient from? (Home, TCU, etc.): [facility]  Other Important info for NEXT shift: IM zyprexa given 1x this shift. Patient agitated- unable to re-direct. Multiple attempts at PO medication.   Anticipated DC date & active delays: TBD- Pending placement back to facility  _____________________________________________________________________________  SUMMARY NOTE:   Orientation/Cognitive: Alert- Confused  Observation Goals (Met/ Not Met): Inpatient  Mobility Level/Assist Equipment: Ind/SBA w/ supervision  Antibiotics & Plan (IV/po, length of tx left): n/a  Pain Management: No  Complete Pain Reassessment: Y/N N Due next: Next shift  Tele/VS/O2: Refusing vitals  ABNL Lab/BG: No current labs  Diet: Reg  Bowel/Bladder: Incont @ times  Skin Concerns: Scab to lower extremity  Drains/Devices: No IV access  Patient Stated Goal for Today: n/a

## 2025-04-18 NOTE — PLAN OF CARE
Goal Outcome Evaluation:    PRIMARY Concern: Dementia w/ aggression  SAFETY RISK Concerns (fall risk, behaviors, etc.): Behavior- Yellow      Aggression Tool Color: Yellow  Isolation/Type: n/a  Tests/Procedures for NEXT shift: n/a  Consults? (Pending/following, signed-off?)  coordinates pending discharge disposition  Where is patient from? (Home, TCU, etc.): [facility]  Other Important info for NEXT shift: Ofelia came to assess patient this evening  Anticipated DC date & active delays: TBD-RN from [place] came to assess patient, did not seem thrilled to let patient back     SUMMARY NOTE:  Orientation/Cognitive: Alert- Confused  Observation Goals (Met/ Not Met): Inpatient  Mobility Level/Assist Equipment: Ind/SBA w/ supervision  Antibiotics & Plan (IV/po, length of tx left): n/a  Pain Management: No  Complete Pain Reassessment: Y/N N Due next: Next shift  Tele/VS/O2: Refusing vitals  ABNL Lab/BG: No current labs  Diet: Reg, safe tray, no utensils  Bowel/Bladder: Incont @ times  Skin Concerns: Scab to lower extremity  Drains/Devices: No IV access  Patient Stated Goal for Today: n/a  Additional Info: Patient took his Depakote mixed in Pudding today, Depakote was switch to capsules that can be opened to mix in.
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
## 2025-04-19 PROCEDURE — 120N000001 HC R&B MED SURG/OB

## 2025-04-19 PROCEDURE — 99232 SBSQ HOSP IP/OBS MODERATE 35: CPT | Performed by: STUDENT IN AN ORGANIZED HEALTH CARE EDUCATION/TRAINING PROGRAM

## 2025-04-19 PROCEDURE — 250N000013 HC RX MED GY IP 250 OP 250 PS 637: Performed by: STUDENT IN AN ORGANIZED HEALTH CARE EDUCATION/TRAINING PROGRAM

## 2025-04-19 PROCEDURE — 250N000013 HC RX MED GY IP 250 OP 250 PS 637: Performed by: PHYSICIAN ASSISTANT

## 2025-04-19 PROCEDURE — 250N000013 HC RX MED GY IP 250 OP 250 PS 637: Performed by: INTERNAL MEDICINE

## 2025-04-19 RX ADMIN — CLONIDINE HYDROCHLORIDE 0.1 MG: 0.1 TABLET ORAL at 09:02

## 2025-04-19 RX ADMIN — LISINOPRIL 40 MG: 40 TABLET ORAL at 09:02

## 2025-04-19 RX ADMIN — CINACALCET 30 MG: 30 TABLET ORAL at 09:02

## 2025-04-19 RX ADMIN — METOPROLOL SUCCINATE 50 MG: 50 TABLET, EXTENDED RELEASE ORAL at 09:02

## 2025-04-19 RX ADMIN — SENNOSIDES 1 TABLET: 8.6 TABLET ORAL at 09:02

## 2025-04-19 RX ADMIN — FUROSEMIDE 40 MG: 40 TABLET ORAL at 09:02

## 2025-04-19 RX ADMIN — DIVALPROEX SODIUM 1000 MG: 125 CAPSULE, COATED PELLETS ORAL at 07:37

## 2025-04-19 RX ADMIN — AMLODIPINE BESYLATE 10 MG: 10 TABLET ORAL at 09:02

## 2025-04-19 RX ADMIN — QUETIAPINE 150 MG: 50 TABLET, FILM COATED, EXTENDED RELEASE ORAL at 07:38

## 2025-04-19 ASSESSMENT — ACTIVITIES OF DAILY LIVING (ADL)
ADLS_ACUITY_SCORE: 65
ADLS_ACUITY_SCORE: 72
ADLS_ACUITY_SCORE: 65
ADLS_ACUITY_SCORE: 72
ADLS_ACUITY_SCORE: 72
ADLS_ACUITY_SCORE: 65
ADLS_ACUITY_SCORE: 79
ADLS_ACUITY_SCORE: 65
ADLS_ACUITY_SCORE: 65
ADLS_ACUITY_SCORE: 72
ADLS_ACUITY_SCORE: 65
ADLS_ACUITY_SCORE: 72
ADLS_ACUITY_SCORE: 65
ADLS_ACUITY_SCORE: 65
ADLS_ACUITY_SCORE: 79
ADLS_ACUITY_SCORE: 65
ADLS_ACUITY_SCORE: 68
ADLS_ACUITY_SCORE: 65
ADLS_ACUITY_SCORE: 65

## 2025-04-19 NOTE — PROGRESS NOTES
"[facility]  Hospitalist Progress Note    Assessment & Plan   Estevan Aaron is a 64 year old male with PMH of HTN, HLD, hyperparathyroidism, dementia who was admitted on 4/4/25 for aggressive behaviors at his care facility. Reportedly the facility has refused to take him back until seen by a provider and had medications adjusted.      Alzheimer's Dementia with Agitation  Reportedly aggressive behaviors at [facility]. Was calm and cooperative with staff in the ED  Overnight on the night of admission had events with the nursing staff when he tried to hit them. Was hospitalized in inpatient Psych from Oct 2024 through 2/24/25; appears he was discharged on oral Zyprexa. BMP/CBC unremarkable, UA not sent.     - Currently has a 1:1 sitter      - Please use PO PRN meds prior to IM meds. If giving IM meds then please document why IM meds were given over PO meds.      - Psych consulted and signed off 4/17/25                - Repeat Depakote level               - Continue Depakote 100mg BID                - Continue Seroquel 400mg at bedtime                - Continue Seroquel 150mg QAM                - Continue PRN Seroquel  - Recommend SLP evaluation for appropriate communication aids/strategies                 - Recommend discharge      - SW working on discharge                - Tentative discharge plan for 4/21/25    - Per nursing notes, [facility] did not seem \"thrilled\" to take patient     - Speech consulted and following      HTN  - Continue PTA amlodipine, clonidine, lasix, lisinopril, metoprolol      HLD  - Continue PTA atorvastatin      Hyperparathyroidism  - Continue Sensipar    Clinically Significant Risk Factors                   # Hypertension: Noted on problem list     # Dementia: noted on problem list            # Financial/Environmental Concerns: none           Diet: Combination Diet Regular Diet; Safe Tray - NO utensils  Room Service     DVT Prophylaxis: Ambulate every shift   Alberto " Catheter: Not present  Lines: None     Cardiac Monitoring: None  Code Status: Full Code      Disposition Plan       Expected Discharge Date: 04/21/2025, 12:00 PM  Discharge Delays: 1:1 Sitter still ordered - MD to assess  Specialist Consult (enter specialist & decision needed in comments)  Destination: other (comment) (inpatient racheal psych or return [facility]  Discharge Comments: sitter,  Memory care staff will come to assess  [facility] making arrangements for patient's return Monday 4/21      Entered: Evelin Nava MD 04/19/2025, 11:29 AM     Care Team Updated: CM updated     Disposition:  Patient is medically ready for discharge. Awaiting a safe discharge plan. Tentative plan for discharge on 4/21/25.       Medically Ready for Discharge: Ready Now        Evelin Nava MD  Hospitalist Service   [facility]  Securely message with the Vocera Web Console (learn more here)         Medical Decision Making       35 MINUTES SPENT BY ME on the date of service doing chart review, history, exam, documentation & further activities per the note.           Interval History     Ws given a dose of IM zyprexa las night. Unclear why it was given over PO.     This morning the patient was sleeping in his chair.     -Data reviewed today: I reviewed all new labs and imaging results over the last 24 hours  Physical Exam   Temp: 99.3  F (37.4  C) Temp src: Axillary BP: (!) 144/96 (Notified RN) Pulse: 95   Resp: 18 SpO2: 93 % O2 Device: None (Room air)    There were no vitals filed for this visit.  Vital Signs with Ranges  Temp:  [99.3  F (37.4  C)] 99.3  F (37.4  C)  Pulse:  [95] 95  Resp:  [18] 18  BP: (144)/(96) 144/96  SpO2:  [93 %] 93 %  No intake/output data recorded.      Constitutional: Sleepy  HEENT: PERRL, Normocephalic, without obvious abnormality, atraumatic, oral pharynx with moist mucus membranes  Pulmonary: No increased work of breathing, good air exchange, clear  to auscultation bilaterally, no crackles or wheezing.  Cardiovascular: Regular rate and rhythm, normal S1 and S2  GI: Normal bowel sounds, soft, distended, non-tender.  Skin/Integumen: Visualized skin appeared clear.  Psych:  Alert to self.   Extremities: Mild edema present        Medications   Current Facility-Administered Medications   Medication Dose Route Frequency Provider Last Rate Last Admin     Current Facility-Administered Medications   Medication Dose Route Frequency Provider Last Rate Last Admin    amLODIPine (NORVASC) tablet 10 mg  10 mg Oral Daily Armando Kern MD   10 mg at 04/19/25 0902    atorvastatin (LIPITOR) tablet 40 mg  40 mg Oral Daily Armando Kern MD   40 mg at 04/18/25 1945    cinacalcet (SENSIPAR) tablet 30 mg  30 mg Oral Daily Evelin Nava MD   30 mg at 04/19/25 0902    cloNIDine (CATAPRES) tablet 0.1 mg  0.1 mg Oral BID Armando Kern MD   0.1 mg at 04/19/25 0902    divalproex sodium extended-release (DEPAKOTE ER) 24 hr tablet 1,000 mg  1,000 mg Oral Q12H JOHN (08/20) Evelin Nava MD   1,000 mg at 04/18/25 1946    Or    divalproex sodium delayed-release (DEPAKOTE SPRINKLE) DR capsule 1,000 mg  1,000 mg Oral Q12H JOHN (08/20) Evelin Nava MD   1,000 mg at 04/19/25 0737    furosemide (LASIX) tablet 40 mg  40 mg Oral Daily Armando Kern MD   40 mg at 04/19/25 0902    lisinopril (ZESTRIL) tablet 40 mg  40 mg Oral Daily Armando Kern MD   40 mg at 04/19/25 0902    melatonin tablet 3 mg  3 mg Oral At Bedtime Oleksandr Kong MD   3 mg at 04/18/25 2129    metoprolol succinate ER (TOPROL XL) 24 hr tablet 50 mg  50 mg Oral Daily Armando Kern MD   50 mg at 04/19/25 0902    QUEtiapine (SEROquel XR) 24 hr tablet 400 mg  400 mg Oral At Bedtime Eduardo Reza PA-C   400 mg at 04/18/25 2129    And    QUEtiapine (SEROquel XR) 24 hr tablet 150 mg  150 mg Oral QAM Eduardo Reza PA-C   150 mg at 04/19/25 0738    sennosides (SENOKOT) tablet 1 tablet   1 tablet Oral Daily Armando Kern MD   1 tablet at 04/19/25 0902       Data   No lab results found in last 7 days.    No results found for this or any previous visit (from the past 24 hours).

## 2025-04-19 NOTE — PROGRESS NOTES
Goal Outcome Evaluation:  PRIMARY Concern: Dementia w/ aggression  SAFETY RISK Concerns (fall risk, behaviors, etc.): Behavior- green      Aggression Tool Color: Green Pt slept during the evening shift.   Isolation/Type: n/a  Tests/Procedures for NEXT shift: n/a  Consults? (Pending/following, signed-off?) SW, and SLP following. Syed signed off.   Where is patient from? (Home, TCU, etc.): Placement  Other Important info for NEXT shift: Anticipated DC date & active delays: TBD     SUMMARY NOTE:  Orientation/Cognitive: Alert- Confused  Observation Goals (Met/ Not Met): Inpatient  Mobility Level/Assist Equipment: Ind/SBA w/ supervision  Antibiotics & Plan (IV/po, length of tx left): n/a  Pain Management: No  Tele/VS/O2:   ABNL Lab/BG: No current labs  Diet: Reg, safe tray, no utensils  Bowel/Bladder: Incont @ times  Skin Concerns: Scab to lower extremity, scratch on back   Drains/Devices: No IV access  Patient Stated Goal for Today: n/a  Additional Info: able to take oral medication.

## 2025-04-19 NOTE — PLAN OF CARE
Goal Outcome Evaluation:         Goal Outcome Evaluation:     PRIMARY Concern: Dementia w/ aggression  SAFETY RISK Concerns (fall risk, behaviors, etc.): Behavior- Yellow      Aggression Tool Color: Yellow  Isolation/Type: n/a  Tests/Procedures for NEXT shift: n/a  Consults? (Pending/following, signed-off?) SW coordinates pending discharge disposition  Where is patient from? (Home, TCU, etc.): Placement  Other Important info for NEXT shift: Anticipated DC date & active delays: TBD     SUMMARY NOTE:  Orientation/Cognitive: Alert- Confused, sleepy  Observation Goals (Met/ Not Met): Inpatient  Mobility Level/Assist Equipment: Ind/SBA w/ supervision  Antibiotics & Plan (IV/po, length of tx left): n/a  Pain Management: No  Complete Pain Reassessment: Y/N N Due next: Next shift  Tele/VS/O2:   ABNL Lab/BG: No current labs  Diet: Reg, safe tray, no utensils  Bowel/Bladder: Incont @ times  Skin Concerns: Scab to lower extremity  Drains/Devices: No IV access  Patient Stated Goal for Today: n/a  Additional Info: able to take  morning oral medication.

## 2025-04-19 NOTE — PLAN OF CARE
Goal Outcome Evaluation:     PRIMARY Concern: Dementia w/ aggression  SAFETY RISK Concerns (fall risk, behaviors, etc.): Behavior- Yellow      Aggression Tool Color: Yellow  Isolation/Type: n/a  Tests/Procedures for NEXT shift: n/a  Consults? (Pending/following, signed-off?) SW coordinates pending discharge disposition  Where is patient from? (Home, TCU, etc.): Placement  Other Important info for NEXT shift: Had Code Green Yesterday evening    Anticipated DC date & active delays: TBD     SUMMARY NOTE:  Orientation/Cognitive: Alert- Confused  Observation Goals (Met/ Not Met): Inpatient  Mobility Level/Assist Equipment: Ind/SBA w/ supervision  Antibiotics & Plan (IV/po, length of tx left): n/a  Pain Management: No  Complete Pain Reassessment: Y/N N Due next: Next shift  Tele/VS/O2: Refusing vitals  ABNL Lab/BG: No current labs  Diet: Reg, safe tray, no utensils  Bowel/Bladder: Incont @ times  Skin Concerns: Scab to lower extremity  Drains/Devices: No IV access  Patient Stated Goal for Today: n/a  Additional Info: Patient took his Depakote mixed in Pudding today, Depakote was switch to capsules that can be opened to mix in.

## 2025-04-20 ENCOUNTER — APPOINTMENT (OUTPATIENT)
Dept: GENERAL RADIOLOGY | Facility: CLINIC | Age: 65
DRG: 056 | End: 2025-04-20
Attending: NURSE PRACTITIONER
Payer: MEDICARE

## 2025-04-20 LAB
ALBUMIN SERPL BCG-MCNC: 4.2 G/DL (ref 3.5–5.2)
ALBUMIN UR-MCNC: 30 MG/DL
ALP SERPL-CCNC: 102 U/L (ref 40–150)
ALT SERPL W P-5'-P-CCNC: 45 U/L (ref 0–70)
ANION GAP SERPL CALCULATED.3IONS-SCNC: 12 MMOL/L (ref 7–15)
APPEARANCE UR: CLEAR
AST SERPL W P-5'-P-CCNC: 149 U/L (ref 0–45)
BASE EXCESS BLDV CALC-SCNC: 3.1 MMOL/L (ref -3–3)
BASE EXCESS BLDV CALC-SCNC: 6.2 MMOL/L (ref -3–3)
BILIRUB SERPL-MCNC: 0.9 MG/DL
BILIRUB UR QL STRIP: NEGATIVE
BUN SERPL-MCNC: 19.5 MG/DL (ref 8–23)
CALCIUM SERPL-MCNC: 11.2 MG/DL (ref 8.8–10.4)
CHLORIDE SERPL-SCNC: 106 MMOL/L (ref 98–107)
COLOR UR AUTO: YELLOW
CREAT SERPL-MCNC: 1.02 MG/DL (ref 0.67–1.17)
EGFRCR SERPLBLD CKD-EPI 2021: 82 ML/MIN/1.73M2
ERYTHROCYTE [DISTWIDTH] IN BLOOD BY AUTOMATED COUNT: 18.1 % (ref 10–15)
FLUAV RNA SPEC QL NAA+PROBE: NEGATIVE
FLUBV RNA RESP QL NAA+PROBE: NEGATIVE
GLUCOSE BLDC GLUCOMTR-MCNC: 95 MG/DL (ref 70–99)
GLUCOSE SERPL-MCNC: 113 MG/DL (ref 70–99)
GLUCOSE UR STRIP-MCNC: NEGATIVE MG/DL
HCO3 BLDV-SCNC: 32 MMOL/L (ref 21–28)
HCO3 BLDV-SCNC: 32 MMOL/L (ref 21–28)
HCO3 SERPL-SCNC: 30 MMOL/L (ref 22–29)
HCT VFR BLD AUTO: 45.4 % (ref 40–53)
HGB BLD-MCNC: 13.9 G/DL (ref 13.3–17.7)
HGB UR QL STRIP: ABNORMAL
KETONES UR STRIP-MCNC: 10 MG/DL
LACTATE SERPL-SCNC: 1 MMOL/L (ref 0.7–2)
LACTATE SERPL-SCNC: 4.9 MMOL/L (ref 0.7–2)
LEUKOCYTE ESTERASE UR QL STRIP: NEGATIVE
MCH RBC QN AUTO: 19.6 PG (ref 26.5–33)
MCHC RBC AUTO-ENTMCNC: 30.6 G/DL (ref 31.5–36.5)
MCV RBC AUTO: 64 FL (ref 78–100)
MRSA DNA SPEC QL NAA+PROBE: NEGATIVE
MUCOUS THREADS #/AREA URNS LPF: PRESENT /LPF
NITRATE UR QL: NEGATIVE
NT-PROBNP SERPL-MCNC: 157 PG/ML (ref 0–900)
O2/TOTAL GAS SETTING VFR VENT: 0 %
O2/TOTAL GAS SETTING VFR VENT: 30 %
OXYHGB MFR BLDV: 27 % (ref 70–75)
OXYHGB MFR BLDV: 36 % (ref 70–75)
PCO2 BLDV: 51 MM HG (ref 40–50)
PCO2 BLDV: 65 MM HG (ref 40–50)
PH BLDV: 7.29 [PH] (ref 7.32–7.43)
PH BLDV: 7.41 [PH] (ref 7.32–7.43)
PH UR STRIP: 6 [PH] (ref 5–7)
PLATELET # BLD AUTO: 222 10E3/UL (ref 150–450)
PO2 BLDV: 21 MM HG (ref 25–47)
PO2 BLDV: 28 MM HG (ref 25–47)
POTASSIUM SERPL-SCNC: 3.9 MMOL/L (ref 3.4–5.3)
PROCALCITONIN SERPL IA-MCNC: 0.12 NG/ML
PROT SERPL-MCNC: 7.5 G/DL (ref 6.4–8.3)
RBC # BLD AUTO: 7.08 10E6/UL (ref 4.4–5.9)
RBC URINE: 5 /HPF
RSV RNA SPEC NAA+PROBE: NEGATIVE
SA TARGET DNA: POSITIVE
SAO2 % BLDV: 27.3 % (ref 70–75)
SAO2 % BLDV: 37.2 % (ref 70–75)
SARS-COV-2 RNA RESP QL NAA+PROBE: NEGATIVE
SODIUM SERPL-SCNC: 148 MMOL/L (ref 135–145)
SP GR UR STRIP: 1.02 (ref 1–1.03)
TROPONIN T SERPL HS-MCNC: 26 NG/L
UROBILINOGEN UR STRIP-MCNC: 2 MG/DL
WBC # BLD AUTO: 13.1 10E3/UL (ref 4–11)
WBC URINE: 1 /HPF

## 2025-04-20 PROCEDURE — 250N000013 HC RX MED GY IP 250 OP 250 PS 637: Performed by: PHYSICIAN ASSISTANT

## 2025-04-20 PROCEDURE — 87040 BLOOD CULTURE FOR BACTERIA: CPT | Performed by: NURSE PRACTITIONER

## 2025-04-20 PROCEDURE — 82247 BILIRUBIN TOTAL: CPT | Performed by: NURSE PRACTITIONER

## 2025-04-20 PROCEDURE — 99233 SBSQ HOSP IP/OBS HIGH 50: CPT | Performed by: STUDENT IN AN ORGANIZED HEALTH CARE EDUCATION/TRAINING PROGRAM

## 2025-04-20 PROCEDURE — 250N000013 HC RX MED GY IP 250 OP 250 PS 637: Performed by: INTERNAL MEDICINE

## 2025-04-20 PROCEDURE — 999N000157 HC STATISTIC RCP TIME EA 10 MIN

## 2025-04-20 PROCEDURE — 84484 ASSAY OF TROPONIN QUANT: CPT | Performed by: NURSE PRACTITIONER

## 2025-04-20 PROCEDURE — 250N000011 HC RX IP 250 OP 636: Performed by: NURSE PRACTITIONER

## 2025-04-20 PROCEDURE — 272N000064 HC CIRCUIT HUMIDITY W/CPAP BIPAP

## 2025-04-20 PROCEDURE — 71045 X-RAY EXAM CHEST 1 VIEW: CPT

## 2025-04-20 PROCEDURE — 82805 BLOOD GASES W/O2 SATURATION: CPT | Performed by: NURSE PRACTITIONER

## 2025-04-20 PROCEDURE — 36415 COLL VENOUS BLD VENIPUNCTURE: CPT | Performed by: NURSE PRACTITIONER

## 2025-04-20 PROCEDURE — 87640 STAPH A DNA AMP PROBE: CPT | Performed by: NURSE PRACTITIONER

## 2025-04-20 PROCEDURE — 120N000001 HC R&B MED SURG/OB

## 2025-04-20 PROCEDURE — 99207 PR NO BILLABLE SERVICE THIS VISIT: CPT | Performed by: NURSE PRACTITIONER

## 2025-04-20 PROCEDURE — 84145 PROCALCITONIN (PCT): CPT | Performed by: NURSE PRACTITIONER

## 2025-04-20 PROCEDURE — 99418 PROLNG IP/OBS E/M EA 15 MIN: CPT | Performed by: STUDENT IN AN ORGANIZED HEALTH CARE EDUCATION/TRAINING PROGRAM

## 2025-04-20 PROCEDURE — 85014 HEMATOCRIT: CPT | Performed by: NURSE PRACTITIONER

## 2025-04-20 PROCEDURE — 250N000013 HC RX MED GY IP 250 OP 250 PS 637: Performed by: HOSPITALIST

## 2025-04-20 PROCEDURE — 83605 ASSAY OF LACTIC ACID: CPT | Performed by: NURSE PRACTITIONER

## 2025-04-20 PROCEDURE — 87637 SARSCOV2&INF A&B&RSV AMP PRB: CPT | Performed by: NURSE PRACTITIONER

## 2025-04-20 PROCEDURE — 272N000063 HC CIRCUIT HUMID FACE/TRACH MSK

## 2025-04-20 PROCEDURE — 83880 ASSAY OF NATRIURETIC PEPTIDE: CPT | Performed by: NURSE PRACTITIONER

## 2025-04-20 PROCEDURE — 258N000003 HC RX IP 258 OP 636: Performed by: NURSE PRACTITIONER

## 2025-04-20 PROCEDURE — 250N000013 HC RX MED GY IP 250 OP 250 PS 637: Performed by: STUDENT IN AN ORGANIZED HEALTH CARE EDUCATION/TRAINING PROGRAM

## 2025-04-20 PROCEDURE — 94660 CPAP INITIATION&MGMT: CPT

## 2025-04-20 PROCEDURE — 81003 URINALYSIS AUTO W/O SCOPE: CPT | Performed by: NURSE PRACTITIONER

## 2025-04-20 RX ORDER — PIPERACILLIN SODIUM, TAZOBACTAM SODIUM 4; .5 G/20ML; G/20ML
4.5 INJECTION, POWDER, LYOPHILIZED, FOR SOLUTION INTRAVENOUS EVERY 6 HOURS
Status: DISCONTINUED | OUTPATIENT
Start: 2025-04-20 | End: 2025-04-22

## 2025-04-20 RX ORDER — CARBOXYMETHYLCELLULOSE SODIUM 5 MG/ML
1 SOLUTION/ DROPS OPHTHALMIC
Status: DISCONTINUED | OUTPATIENT
Start: 2025-04-20 | End: 2025-05-03

## 2025-04-20 RX ADMIN — PIPERACILLIN AND TAZOBACTAM 4.5 G: 4; .5 INJECTION, POWDER, FOR SOLUTION INTRAVENOUS at 10:34

## 2025-04-20 RX ADMIN — AMLODIPINE BESYLATE 10 MG: 10 TABLET ORAL at 08:12

## 2025-04-20 RX ADMIN — ATORVASTATIN CALCIUM 40 MG: 40 TABLET, FILM COATED ORAL at 21:31

## 2025-04-20 RX ADMIN — CLONIDINE HYDROCHLORIDE 0.1 MG: 0.1 TABLET ORAL at 21:31

## 2025-04-20 RX ADMIN — PIPERACILLIN AND TAZOBACTAM 4.5 G: 4; .5 INJECTION, POWDER, FOR SOLUTION INTRAVENOUS at 21:32

## 2025-04-20 RX ADMIN — DIVALPROEX SODIUM 1000 MG: 500 TABLET, FILM COATED, EXTENDED RELEASE ORAL at 21:32

## 2025-04-20 RX ADMIN — PIPERACILLIN AND TAZOBACTAM 4.5 G: 4; .5 INJECTION, POWDER, FOR SOLUTION INTRAVENOUS at 06:06

## 2025-04-20 RX ADMIN — CLONIDINE HYDROCHLORIDE 0.1 MG: 0.1 TABLET ORAL at 08:12

## 2025-04-20 RX ADMIN — SENNOSIDES 1 TABLET: 8.6 TABLET ORAL at 08:13

## 2025-04-20 RX ADMIN — METOPROLOL SUCCINATE 50 MG: 50 TABLET, EXTENDED RELEASE ORAL at 08:12

## 2025-04-20 RX ADMIN — MELATONIN TAB 3 MG 3 MG: 3 TAB at 21:32

## 2025-04-20 RX ADMIN — QUETIAPINE 150 MG: 50 TABLET, FILM COATED, EXTENDED RELEASE ORAL at 08:11

## 2025-04-20 RX ADMIN — DIVALPROEX SODIUM 1000 MG: 125 CAPSULE, COATED PELLETS ORAL at 08:11

## 2025-04-20 RX ADMIN — CINACALCET 30 MG: 30 TABLET ORAL at 08:13

## 2025-04-20 RX ADMIN — SODIUM CHLORIDE, SODIUM LACTATE, POTASSIUM CHLORIDE, AND CALCIUM CHLORIDE 1000 ML: .6; .31; .03; .02 INJECTION, SOLUTION INTRAVENOUS at 04:56

## 2025-04-20 RX ADMIN — PIPERACILLIN AND TAZOBACTAM 4.5 G: 4; .5 INJECTION, POWDER, FOR SOLUTION INTRAVENOUS at 17:40

## 2025-04-20 RX ADMIN — LISINOPRIL 40 MG: 40 TABLET ORAL at 08:12

## 2025-04-20 ASSESSMENT — ACTIVITIES OF DAILY LIVING (ADL)
ADLS_ACUITY_SCORE: 75
ADLS_ACUITY_SCORE: 79
ADLS_ACUITY_SCORE: 72
ADLS_ACUITY_SCORE: 79
ADLS_ACUITY_SCORE: 79
ADLS_ACUITY_SCORE: 72
ADLS_ACUITY_SCORE: 79
ADLS_ACUITY_SCORE: 79
ADLS_ACUITY_SCORE: 75
ADLS_ACUITY_SCORE: 72
ADLS_ACUITY_SCORE: 79
ADLS_ACUITY_SCORE: 75
ADLS_ACUITY_SCORE: 79
ADLS_ACUITY_SCORE: 75
ADLS_ACUITY_SCORE: 79
ADLS_ACUITY_SCORE: 75
ADLS_ACUITY_SCORE: 75
ADLS_ACUITY_SCORE: 79
ADLS_ACUITY_SCORE: 75

## 2025-04-20 NOTE — PROGRESS NOTES
Goal Outcome Evaluation:  PRIMARY Concern: Presents with aggressive behavior from his [facility],Hx of dementia w/ aggression  SAFETY RISK Concerns (fall risk, behaviors, etc.): Behavior- green      Aggression Tool Color: yellow  Isolation/Type: n/a  Tests/Procedures for NEXT shift:   Consults? (Pending/following, signed-off?) Psych signed off, SW/SLP following   Where is patient from? (Home, TCU, etc.):[facility], can't take him back  Other Important info for NEXT shift: Patient sleeping most of the day, had only one cup of apple sauce. Provider contacted, said she would make medication adjustment and have psyche see patient tomorrow.   Anticipated DC date & active delays: TBD-SW following, pt has a court hearing tomorrow, SW plans to provide pt with laptop to join remotely.     SUMMARY NOTE:  Patient awake this evening but still declining to eat; staff gave bites of food and patient spit out of his mouth eat time.   BIPAP removed this morning, patient on 4L via nasal cannula, saturation up to 94%.   Orientation/Cognitive: Confused, unable to assess orientation.   Observation Goals (Met/ Not Met): Inpatient  Mobility Level/Assist Equipment: In bed all day.   Antibiotics & Plan (IV/po, length of tx left):   Pain Management: No  Tele/VS/O2: NSR  ABNL Lab/BG: lactic back to normal, see chart.   Diet: Reg, safe tray, no utensils  Bowel/Bladder: Incontinent of urine this shift.   Skin Concerns: Scab to lower extremity, abrasion/excoriation  and redness back and rt shin   Drains/Devices:PIV s/l on rt  Patient Stated Goal for Today:none  Additional Info:

## 2025-04-20 NOTE — PROGRESS NOTES
House ALLEN brief note:    Acute encephalopathy suspect multifactorial 2/2 possibly infectious, hypercarbia in setting of PMH dementia.  Acute hypoxic hypercarbic respiratory failure possibly 2/2 HAP.  Lactic acidosis possibly 2/2 sepsis(temp, WBC)/HAP vs intravascular depletion (on lasix).  Leukocytosis.  Suspected sepsis (temp, WBC) possibly 2/2 HAP.  Hypernatremia.  Primary respiratory acidosis with secondary metabolic alkalosis.    Contacted by nursing at 0221 requesting bedside evaluation in setting of pt sleeping since yesterday morning, somnolent, shallow breathing, congested cough (started recently per report), O2 sats 84% on RA.  Presented to pt's bedside.  At time of arrival, pt lying in the recliner, eyes closed, in no overt distress; nursing notes pt has been in the recliner all day.  Pt has bedside attendant present.  Nursing notes pt has been sleeping, intermittently mumbles to himself and has congested, loose cough.  Pt PERRL, 3mm, does not open eyes to voice or noxious stimuli, follows commands x4 extremities sluggishly, does not answer orientation questions, will not state name.  Pt in no apparent respiratory distress, round, soft, abdomen, nontender to deep palpation, nondistended, no obvious guarding or rebound tenderness noted, skin warm to touch, dry, pink, noted scratch/skin tear on pt's R back, no obvious cellulitic skin changes at site, noted pt's oral mucosa profoundly dry, extensive crusting on lips and tongue.  Nursing notes pt had copious amount of nasal secretions, thick, green/white.  Pt's VS noting temp 100.3>100.7 axillary, SBP 160s, HR 80s, RR 10s, O2 sats 84% on RA; now on 2L O2 via NC with O2 sats > 92%.    Interventions:  - BG - 95  - Stat CBC, CMP, lactic acid, VBG, procalcitonin, BNP, trop, blood culture x2  - Stat UA - requested for nursing to straight catheterize pt  - Stat CXR  - Stat influenza/covid/RSV PCR   - Will initiate pt on continuous pulse oximetry  - Noted VBG  results; will order for pt to be placed on Bipap therapy  - Telemetry monitoring   - Will repeat VBG in 2 hours  - 0352: Updated by nursing that pt's lactic acid 4.9; will repeat lactic acid at 0700  - If lactic acid remains elevated, may need to consider CT A/P, could add CT chest for definitive view of chest  - Requested for nursing to place PIV  - Will initiate pt on zosyn therapy for possible HAP (congested cough, low grade temp, hypoxia, leukocytosis - noted pt's procalcitonin WNL, could possibly de-escalate antibiotics soon; however, will continue for now in setting of encephalopathy, lactic acidosis)  - Sputum culture  - MRSA/MSSA PCR  - Will place PTA PO lasix on hold  - Will order 1L LR bolus over 1 hour  - Will place pt in 4-point restraints while pt requiring Bipap therapy with bedside attendant present at pt's bedside at all times as pt just required CODE GREEN, agitated, restless, will minimize sedating medications  - Placed hold on PRN IM zyprexa  - Will adjust VS to Q4H  - Attempted contacting pt's daughter and co-guardian, Maria C; however, no answer, left voicemail to contact unit for an update    Recent Labs   Lab 04/20/25 0334   PHV 7.29*   PO2V 28   PCO2V 65*   HCO3V 32*     Recent Labs   Lab 04/20/25 0334   WBC 13.1*   HGB 13.9   HCT 45.4   MCV 64*        Recent Labs   Lab 04/20/25 0334 04/20/25  0237   *  --    POTASSIUM 3.9  --    CHLORIDE 106  --    CO2 30*  --    ANIONGAP 12  --    * 95   BUN 19.5  --    CR 1.02  --    GFRESTIMATED 82  --    UDAY 11.2*  --    PROTTOTAL 7.5  --    ALBUMIN 4.2  --    BILITOTAL 0.9  --    ALKPHOS 102  --    *  --    ALT 45  --      Recent Labs   Lab 04/20/25 0334   NTBNPI 157     Recent Labs   Lab 04/20/25 0334   LACT 4.9*      Latest Reference Range & Units 04/20/25 03:34   Procalcitonin <0.50 ng/mL 0.12      Latest Reference Range & Units 04/20/25 03:34   Troponin T, High Sensitivity <=22 ng/L 26 (H)   (H): Data is abnormally  high    Recent Labs   Lab 04/20/25  0317   COLOR Yellow   APPEARANCE Clear   URINEGLC Negative   URINEBILI Negative   URINEKETONE 10*   SG 1.025   UBLD Moderate*   URINEPH 6.0   PROTEIN 30*   NITRITE Negative   LEUKEST Negative   RBCU 5*   WBCU 1      Latest Reference Range & Units 04/20/25 03:16   SARS CoV2 PCR Negative  Negative   Influenza A Negative  Negative   Influenza B Negative  Negative   Resp Syncytial Virus Negative  Negative     IMAGING: (X-ray/CT/MRI)   Recent Results (from the past 24 hours)   XR Chest Port 1 View    Narrative    EXAM: XR CHEST PORT 1 VIEW  LOCATION: [facility]  DATE: 4/20/2025    INDICATION: Fever, congested cough  COMPARISON: 11/12/2024      Impression    IMPRESSION: Rightward patient rotation. Cardiac silhouette upper range of normal in size. No evidence of focal airspace disease. No evidence of pneumothorax or pleural effusion.     Bill Zamarripa, DILCIA, CNP  Hospitalist-House ALLEN  Hospitalist Service  Securely message with Strut (more info)  Text page via [employer] Paging/Directory     Medical Decision Making       45 MINUTES SPENT BY ME on the date of service doing chart review, history, exam, documentation & further activities per the note.

## 2025-04-20 NOTE — PLAN OF CARE
Goal Outcome Evaluation:    Goal Outcome Evaluation:  PRIMARY Concern: Presents with aggressive behavior from his [facility],Hx of dementia w/ aggression  SAFETY RISK Concerns (fall risk, behaviors, etc.): Behavior- green      Aggression Tool Color: yellow  Isolation/Type: n/a  Tests/Procedures for NEXT shift: Lactic and blood gas to be collected, bld culture in process  Consults? (Pending/following, signed-off?) Psych signed off, SW/SLP following   Where is patient from? (Home, TCU, etc.):[facility], can't take him back  Other Important info for NEXT shift: Received report that pt had slept all day, on assessment, pt was somnolent, elevated, temp, B.P, low oxygen and congested cough, house  NP was notified wo came down to see pt, ordered labs, cx-rays, resp panel, S/C for UA, bld gas. Lactic back 4.9, more new orders, pt placed on 4 point restraints , I.V placed, L/R bolus and I.v antibiotic given.  Anticipated DC date & active delays: TBD-SW following, pt has a court hearing tomorrow, SW plans to provide pt with laptop to join remote     SUMMARY NOTE:  Orientation/Cognitive: Alert- Confused, UTD orientation  Observation Goals (Met/ Not Met): Inpatient  Mobility Level/Assist Equipment: AX2, lift or sliding scale, unable to amb this shift, used heavy assist of 3 to transfer from recliner to bed at beginning of shift  Antibiotics & Plan (IV/po, length of tx left):   Pain Management: No  Tele/VS/O2:   ABNL Lab/BG: lactic 4.9, see chart for the rest  Diet: Reg, safe tray, no utensils  Bowel/Bladder: Incontinent, was s/c for urine sample  Skin Concerns: Scab to lower extremity, abrasion/excoriation  and redness back and rt shin   Drains/Devices:PIV s/l on rt  Patient Stated Goal for Today:none  Additional Info: On BPAP, will recheck lactic and blood gas level and re-assess need
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
## 2025-04-21 ENCOUNTER — APPOINTMENT (OUTPATIENT)
Dept: SPEECH THERAPY | Facility: CLINIC | Age: 65
DRG: 056 | End: 2025-04-21
Attending: STUDENT IN AN ORGANIZED HEALTH CARE EDUCATION/TRAINING PROGRAM
Payer: MEDICARE

## 2025-04-21 LAB
ALBUMIN SERPL BCG-MCNC: 3.4 G/DL (ref 3.5–5.2)
ALP SERPL-CCNC: 76 U/L (ref 40–150)
ALT SERPL W P-5'-P-CCNC: 31 U/L (ref 0–70)
AMMONIA PLAS-SCNC: 57 UMOL/L (ref 16–60)
ANION GAP SERPL CALCULATED.3IONS-SCNC: 7 MMOL/L (ref 7–15)
AST SERPL W P-5'-P-CCNC: 90 U/L (ref 0–45)
BILIRUB SERPL-MCNC: 0.8 MG/DL
BUN SERPL-MCNC: 24.4 MG/DL (ref 8–23)
CALCIUM SERPL-MCNC: 10.4 MG/DL (ref 8.8–10.4)
CHLORIDE SERPL-SCNC: 110 MMOL/L (ref 98–107)
CREAT SERPL-MCNC: 1.14 MG/DL (ref 0.67–1.17)
EGFRCR SERPLBLD CKD-EPI 2021: 71 ML/MIN/1.73M2
ERYTHROCYTE [DISTWIDTH] IN BLOOD BY AUTOMATED COUNT: 17.5 % (ref 10–15)
GLUCOSE SERPL-MCNC: 100 MG/DL (ref 70–99)
HCO3 SERPL-SCNC: 28 MMOL/L (ref 22–29)
HCT VFR BLD AUTO: 40.1 % (ref 40–53)
HGB BLD-MCNC: 12.2 G/DL (ref 13.3–17.7)
LACTATE SERPL-SCNC: 1 MMOL/L (ref 0.7–2)
MCH RBC QN AUTO: 19.2 PG (ref 26.5–33)
MCHC RBC AUTO-ENTMCNC: 30.4 G/DL (ref 31.5–36.5)
MCV RBC AUTO: 63 FL (ref 78–100)
PLATELET # BLD AUTO: 174 10E3/UL (ref 150–450)
POTASSIUM SERPL-SCNC: 3.8 MMOL/L (ref 3.4–5.3)
PROT SERPL-MCNC: 6.4 G/DL (ref 6.4–8.3)
RBC # BLD AUTO: 6.34 10E6/UL (ref 4.4–5.9)
SODIUM SERPL-SCNC: 145 MMOL/L (ref 135–145)
WBC # BLD AUTO: 11.4 10E3/UL (ref 4–11)

## 2025-04-21 PROCEDURE — 99232 SBSQ HOSP IP/OBS MODERATE 35: CPT | Performed by: PHYSICIAN ASSISTANT

## 2025-04-21 PROCEDURE — 250N000013 HC RX MED GY IP 250 OP 250 PS 637: Performed by: STUDENT IN AN ORGANIZED HEALTH CARE EDUCATION/TRAINING PROGRAM

## 2025-04-21 PROCEDURE — 82435 ASSAY OF BLOOD CHLORIDE: CPT | Performed by: STUDENT IN AN ORGANIZED HEALTH CARE EDUCATION/TRAINING PROGRAM

## 2025-04-21 PROCEDURE — 36415 COLL VENOUS BLD VENIPUNCTURE: CPT | Performed by: STUDENT IN AN ORGANIZED HEALTH CARE EDUCATION/TRAINING PROGRAM

## 2025-04-21 PROCEDURE — 120N000001 HC R&B MED SURG/OB

## 2025-04-21 PROCEDURE — 85018 HEMOGLOBIN: CPT | Performed by: STUDENT IN AN ORGANIZED HEALTH CARE EDUCATION/TRAINING PROGRAM

## 2025-04-21 PROCEDURE — 250N000013 HC RX MED GY IP 250 OP 250 PS 637: Performed by: INTERNAL MEDICINE

## 2025-04-21 PROCEDURE — 250N000011 HC RX IP 250 OP 636: Performed by: NURSE PRACTITIONER

## 2025-04-21 PROCEDURE — 83605 ASSAY OF LACTIC ACID: CPT | Performed by: STUDENT IN AN ORGANIZED HEALTH CARE EDUCATION/TRAINING PROGRAM

## 2025-04-21 PROCEDURE — 99232 SBSQ HOSP IP/OBS MODERATE 35: CPT | Performed by: STUDENT IN AN ORGANIZED HEALTH CARE EDUCATION/TRAINING PROGRAM

## 2025-04-21 PROCEDURE — 82140 ASSAY OF AMMONIA: CPT | Performed by: PHYSICIAN ASSISTANT

## 2025-04-21 PROCEDURE — 250N000013 HC RX MED GY IP 250 OP 250 PS 637: Performed by: HOSPITALIST

## 2025-04-21 PROCEDURE — 92610 EVALUATE SWALLOWING FUNCTION: CPT | Mod: GN

## 2025-04-21 RX ORDER — QUETIAPINE 300 MG/1
300 TABLET, FILM COATED, EXTENDED RELEASE ORAL AT BEDTIME
Status: DISCONTINUED | OUTPATIENT
Start: 2025-04-21 | End: 2025-04-30

## 2025-04-21 RX ADMIN — CLONIDINE HYDROCHLORIDE 0.1 MG: 0.1 TABLET ORAL at 09:38

## 2025-04-21 RX ADMIN — CLONIDINE HYDROCHLORIDE 0.1 MG: 0.1 TABLET ORAL at 21:21

## 2025-04-21 RX ADMIN — CINACALCET 30 MG: 30 TABLET ORAL at 09:36

## 2025-04-21 RX ADMIN — PIPERACILLIN AND TAZOBACTAM 4.5 G: 4; .5 INJECTION, POWDER, FOR SOLUTION INTRAVENOUS at 16:47

## 2025-04-21 RX ADMIN — QUETIAPINE 300 MG: 300 TABLET, FILM COATED, EXTENDED RELEASE ORAL at 21:21

## 2025-04-21 RX ADMIN — MELATONIN TAB 3 MG 3 MG: 3 TAB at 21:20

## 2025-04-21 RX ADMIN — DIVALPROEX SODIUM 1000 MG: 125 CAPSULE, COATED PELLETS ORAL at 09:35

## 2025-04-21 RX ADMIN — PIPERACILLIN AND TAZOBACTAM 4.5 G: 4; .5 INJECTION, POWDER, FOR SOLUTION INTRAVENOUS at 10:02

## 2025-04-21 RX ADMIN — PIPERACILLIN AND TAZOBACTAM 4.5 G: 4; .5 INJECTION, POWDER, FOR SOLUTION INTRAVENOUS at 22:30

## 2025-04-21 RX ADMIN — METOPROLOL SUCCINATE 50 MG: 50 TABLET, EXTENDED RELEASE ORAL at 09:39

## 2025-04-21 RX ADMIN — DIVALPROEX SODIUM 1000 MG: 500 TABLET, FILM COATED, EXTENDED RELEASE ORAL at 21:21

## 2025-04-21 RX ADMIN — SENNOSIDES 1 TABLET: 8.6 TABLET ORAL at 09:36

## 2025-04-21 RX ADMIN — PIPERACILLIN AND TAZOBACTAM 4.5 G: 4; .5 INJECTION, POWDER, FOR SOLUTION INTRAVENOUS at 04:16

## 2025-04-21 ASSESSMENT — ACTIVITIES OF DAILY LIVING (ADL)
ADLS_ACUITY_SCORE: 72
ADLS_ACUITY_SCORE: 67
ADLS_ACUITY_SCORE: 72
ADLS_ACUITY_SCORE: 67
ADLS_ACUITY_SCORE: 72

## 2025-04-21 NOTE — CONSULTS
Psychiatry Consultation; Follow up              Reason for Consult, requesting source:      Requesting source: Oleksandr Kong    Labs and imaging reviewed,     Total time spent in chart review, patient interview and coordination of care;            Interim history:    Patient now appearing sedate.   Seroquel was completely held yesterday. On approach this AM patient remains somnolent. Does not respond to greeting. Grumbles in response to foot touch.         Current Medications:     Current Facility-Administered Medications   Medication Dose Route Frequency Provider Last Rate Last Admin    amLODIPine (NORVASC) tablet 10 mg  10 mg Oral Daily Armando Kern MD   10 mg at 04/20/25 0812    atorvastatin (LIPITOR) tablet 40 mg  40 mg Oral Daily Armando Kern MD   40 mg at 04/20/25 2131    cinacalcet (SENSIPAR) tablet 30 mg  30 mg Oral Daily Evelin Nava MD   30 mg at 04/20/25 0813    cloNIDine (CATAPRES) tablet 0.1 mg  0.1 mg Oral BID Armando Kern MD   0.1 mg at 04/20/25 2131    divalproex sodium extended-release (DEPAKOTE ER) 24 hr tablet 1,000 mg  1,000 mg Oral Q12H [facility] (08/20) Evelin Nava MD   1,000 mg at 04/20/25 2132    Or    divalproex sodium delayed-release (DEPAKOTE SPRINKLE) DR capsule 1,000 mg  1,000 mg Oral Q12H [facility] (08/20) Evelin Nava MD   1,000 mg at 04/20/25 2129    [Held by provider] furosemide (LASIX) tablet 40 mg  40 mg Oral Daily Armando Kern MD   40 mg at 04/19/25 0902    lisinopril (ZESTRIL) tablet 40 mg  40 mg Oral Daily Armando Kern MD   40 mg at 04/20/25 0812    melatonin tablet 3 mg  3 mg Oral At Bedtime Oleksandr Kong MD   3 mg at 04/20/25 2132    metoprolol succinate ER (TOPROL XL) 24 hr tablet 50 mg  50 mg Oral Daily Armando Kern MD   50 mg at 04/20/25 0812    piperacillin-tazobactam (ZOSYN) 4.5 g vial to attach to  mL bag  4.5 g Intravenous Q6H Bill Zamarripa, DILCIA CNP   4.5 g at 04/21/25 0416    [Held by provider]  "QUEtiapine (SEROquel XR) 24 hr tablet 400 mg  400 mg Oral At Bedtime Eduardo Reza PA-C   400 mg at 04/18/25 2129    And    [Held by provider] QUEtiapine (SEROquel XR) 24 hr tablet 150 mg  150 mg Oral QAM Eduardo Reza PA-C   150 mg at 04/20/25 0811    sennosides (SENOKOT) tablet 1 tablet  1 tablet Oral Daily Armando Kern MD   1 tablet at 04/20/25 0813              MSE:   Appearance: Sleeping  Attitude:  somewhat cooperative  Eye Contact:  poor   Mood:    Affect:    Speech:    Psychomotor Behavior:    Muscle strength and tone: intact   Thought Process:    Associations:    Thought Content:    Insight:    Judgement:    Oriented to:    Attention Span and Concentration:    Recent and Remote Memory:      Vital signs:  Temp: 99.9  F (37.7  C) Temp src: Axillary BP: 103/77 Pulse: 73   Resp: 18 SpO2: 92 % O2 Device: None (Room air) Oxygen Delivery: 2 LPM      Estimated body mass index is 40.9 kg/m  as calculated from the following:    Height as of 10/10/24: 1.676 m (5' 6\").    Weight as of 2/23/25: 114.9 kg (253 lb 6.4 oz).    Qtc:          DSM-5 Diagnosis:   Major neurocognitive disorder          Assessment:   Patient more sedate than is useful. Will reduce seroquel to 300 mg at bedtime. Since increase patient has not had any further codes, but has been less mobile and less able to do activities.   Depkatote level is now 64.           Summary of Recommendations:   Continue to hold seroquel for now, decrease scheduled dose to 300mg at bedtime.   Continue depakote 1000mg BID    \"Much or all of the text in this note was generated through the use of Dragon Dictate voice to text software. Errors in spelling or words which appear to be out of contact are unintentional, may be present due having escaped editing\"           "
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
## 2025-04-21 NOTE — PROGRESS NOTES
[facility]    Medicine Progress Note - Hospitalist Service    Date of Admission:  4/4/2025    Assessment & Plan   Estevan Aaron is a 64 year old male with PMH of HTN, HLD, hyperparathyroidism, dementia who was admitted on 4/4/25 for aggressive behaviors at his care facility. Reportedly the facility has refused to take him back until seen by a provider and had medications adjusted.     Acute toxic metabolic encephalopathy, multifactorial  Acute hypoxic hypercarbic respiratory failure, resolved  Concern for aspiration PNA vs pneumonitis  Sepsis w/ elevated lactate, resolved  Respiratory acidosis w/ metabolic compensation  The house team was called early morning on 4/20/25 due patient sleeping since 4/19/25 morning along with development of congested cough, hypoxia to 84%, and temp 100.7F. On exam, somnolence noted without associated respiratory distress, other concerns. Briefly required BiPAP and quickly weaned to O2 NC. CXR without opacity, although some concern for acute aspiration event in setting of suspected medication-mediated somnolence. Mental status slowly improving as psychiatric meds are held.  - O2 goal >90%, wean oxygen as able  - Continue IV Zosyn for now   +Plan to transition to PO on 4/22  - Continue holding furosemide  - Psychiatric medications as below    Alzheimer's dementia with behavioral disturbance  Reportedly aggressive behaviors at [facility]. Was calm and cooperative with staff in the ED, although significant behavioral disturbance noted early this admission. History of inpatient psychiatric hospitaliztion 10/2024-late 2/2025. Ongoing medication titration per psychiatry, now with somnolence.  - Continue 1:1  - Psych re-consulted               - Continue Depakote 100mg BID                - Seroquel 300mg at bedtime                - Hold AM seroquel               - Continue PRN Seroquel  - Tentative discharge planned for 4/22 pending improved somnolence  - SLP  following, cleared for regular diet    Elevated AST, improving  Unclear etiology, no history of liver disease or failure. Possibly in setting of statin.  - Repeat CMP in AM     Suspected ADAM - No formal sleep study, although high suspicion. Outpatient sleep center referral on discharge  HTN - Continue PTA amlodipine, clonidine, lisinopril, metoprolol   HLD - Holding statin given liver dysfunction  Hyperparathyroidism - Continue cinacalcet  Hypernatremia, mild, resolved          Diet: Room Service  Combination Diet Regular Diet; Safe Tray - NO utensils; Soft and Bite Sized Diet (level 6); Thin Liquids (level 0)  Snacks/Supplements Adult: Ensure Enlive; With Meals    DVT Prophylaxis: Pneumatic Compression Devices  Alberto Catheter: Not present  Lines: None     Cardiac Monitoring: ACTIVE order. Indication: [facility]  Code Status: No CPR- Do NOT Intubate      Clinically Significant Risk Factors         # Hypernatremia: Highest Na = 148 mmol/L in last 2 days, will monitor as appropriate  # Hyperchloremia: Highest Cl = 110 mmol/L in last 2 days, will monitor as appropriate       # Hypercalcemia: Highest Ca = 11.2 mg/dL in last 2 days, will monitor as appropriate    # Hypoalbuminemia: Lowest albumin = 3.4 g/dL at 4/21/2025  2:03 PM, will monitor as appropriate     # Hypertension: Noted on problem list     # Acute Hypercapnic Respiratory Failure: based on venous blood gas results.  Continue supplemental oxygen and ventilatory support as indicated.  # Dementia: noted on problem list            # Financial/Environmental Concerns: none         Social Drivers of Health    Food Insecurity: Unknown (4/10/2025)    Food Insecurity     Within the past 12 months, did you worry that your food would run out before you got money to buy more?: Patient unable to answer     Within the past 12 months, did the food you bought just not last and you didn t have money to get more?: Patient unable to answer   Housing Stability: Unknown (4/10/2025)     Housing Stability     Do you have housing? : Patient unable to answer     Are you worried about losing your housing?: Patient unable to answer   Tobacco Use: High Risk (10/28/2024)    Patient History     Smoking Tobacco Use: Every Day     Smokeless Tobacco Use: Never   Financial Resource Strain: Unknown (4/10/2025)    Financial Resource Strain     Within the past 12 months, have you or your family members you live with been unable to get utilities (heat, electricity) when it was really needed?: Patient unable to answer   Transportation Needs: Unknown (4/10/2025)    Transportation Needs     Within the past 12 months, has lack of transportation kept you from medical appointments, getting your medicines, non-medical meetings or appointments, work, or from getting things that you need?: Patient unable to answer   Physical Activity: Insufficiently Active (2/20/2024)    Exercise Vital Sign     Days of Exercise per Week: 2 days     Minutes of Exercise per Session: 10 min   Interpersonal Safety: Unknown (4/10/2025)    Interpersonal Safety     Do you feel physically and emotionally safe where you currently live?: Patient unable to answer     Within the past 12 months, have you been hit, slapped, kicked or otherwise physically hurt by someone?: Patient unable to answer     Within the past 12 months, have you been humiliated or emotionally abused in other ways by your partner or ex-partner?: Patient unable to answer   Stress: Stress Concern Present (2/20/2024)    [place] [employer] - Occupational Stress Questionnaire     Feeling of Stress : To some extent   Social Connections: Unknown (2/20/2024)    Social Connection and Isolation Panel [NHANES]     Frequency of Social Gatherings with Friends and Family: Once a week          Disposition Plan     Medically Ready for Discharge: Anticipated Tomorrow     Troy Farley MD  Hospitalist Service  [facility]  Securely message with  [employer] (more info)  Text page via [facility] Paging/Directory   ______________________________________________________________________    Interval History   More sedated this morning. Placed on oxygen. Plan to discuss with psychiatry, suspect medication mediated. No meaningful interaction with patient.     Physical Exam   Vital Signs: Temp: 99.9  F (37.7  C) Temp src: Axillary BP: 103/77 Pulse: 73   Resp: 18 SpO2: 92 % O2 Device: None (Room air) Oxygen Delivery: 2 LPM  Weight: 0 lbs 0 oz    General: Awake but somnolent, NAD, sitting upright in chair  HEENT: Atraumatic, normocephalic, EOMI, no scleral icterus  CV: RRR, no murmurs, no MARIA ANTONIA, distal pulses intact  Pulm: CTAB, breathing comfortably on 1L NC, no wheezes, no crackles  Abd: Soft, non-tender, non-distended  Skin: No rashes, lesions, or wounds visualized  Neuro: AOx0, withdraws to pain, moving all extremities spontaneously, speech garbled and non-sensical    Medical Decision Making       45 MINUTES SPENT BY ME on the date of service doing chart review, history, exam, documentation & further activities per the note.      Data   ------------------------- PAST 24 HR DATA REVIEWED -----------------------------------------------

## 2025-04-21 NOTE — PROGRESS NOTES
Care Management Follow Up    Length of Stay (days): 14    Expected Discharge Date: 04/21/2025     Concerns to be Addressed: discharge planning     Patient plan of care discussed at interdisciplinary rounds: Yes    Anticipated Discharge Disposition:       Anticipated Discharge Services:    Anticipated Discharge DME:      Patient/family educated on Medicare website which has current facility and service quality ratings:    Education Provided on the Discharge Plan:    Patient/Family in Agreement with the Plan: yes    Referrals Placed by CM/SW:    Private pay costs discussed: Not applicable    Discussed  Partnership in Safe Discharge Planning  document with patient/family: No     Handoff Completed: No, handoff not indicated or clinically appropriate    Additional Information:  Spoke with RN Kimber, from [facility]. She assessed patient on Friday and states she has many concerns regarding notes she read prior to the visit of pt trying to go in patients rooms and pushing, and states pt was very somnolent during her visit. She is worried about the safety of their other residents. SW validated the concerns but states we already went through this with  Vera, who understands pt has a legal right to return to their home and they would need to go through a formal eviction process if they want pt to move. Informed Kimber that [place] will be in violation with [place] statutes, per the Ombudsman, if pt doesn't return. Kimber was not aware of this. Kimber Gamez has not given her any  updates about pt returning today or any of the plan, so she was unaware the plan was for pt to discharge today and the ride was already set up. Kimber states pt previously had a 1:1 from [facility] agency and she would need this to be set up again to ensure safety. Kimber states she will call [facility] to try to arrange as soon as possible but she wasn't sure it would happen today. Kimber asked for a  little more time to get a safe plan in place, she said she will update DEBORAH as soon as she knows more, she also wants to talk with her supervising team further.     Per chart review, pt is not medically ready for discharge. Updated Kimber on this. Rescheduled [facility] transport to tomorrow, 4/22 between [id number].     Per RN Kimber, when pt's medications are being filled at the hospital, they will need to be bubble packed.    Kimber e-mailed this SW and included Vera (ED at [place]) and guardian Sharon, explaining that [facility] may be more appropriate for pt. Kimber also stated that they will need a 1:1 to happen for pt to safely return. Responded to e-mail and explained that DEBORAH is aware [facility] was requested, and stated is still not recommended so we cannot pursue it at this time.     Ofelia Abrams, ZOFIA  Social Work  [facility]

## 2025-04-21 NOTE — PLAN OF CARE
Goal Outcome Evaluation:  PRIMARY Concern: Presents with aggressive behavior from his [facility],Hx of dementia w/ aggression  SAFETY RISK Concerns (fall risk, behaviors, etc.): Behavior- green      Aggression Tool Color: yellow  Isolation/Type: n/a  Tests/Procedures for NEXT shift:   Consults? (Pending/following, signed-off?) Psych signed off, SW/SLP following   Where is patient from? (Home, TCU, etc.):[facility], can't take him back  Other Important info for NEXT shift:    Anticipated DC date & active delays: TBD-SW following, pt has a court hearing today at 0900, SW plans to provide pt with laptop to join remotely.     SUMMARY NOTE:     Orientation/Cognitive: Confused, unable to assess orientation.   Observation Goals (Met/ Not Met): Inpatient  Mobility Level/Assist Equipment: In bed all day.   Antibiotics & Plan (IV/po, length of tx left):   Pain Management: No  Tele/VS/O2: VSS/2L, NSR  ABNL Lab/BG: see chart.   Diet: Reg, safe tray, no utensils  Bowel/Bladder: Incontinent of urine this shift.   Skin Concerns: Scab to lower extremity, abrasion/excoriation  and redness back and rt shin   Drains/Devices:PIV s/l on rt  Patient Stated Goal for Today:none

## 2025-04-21 NOTE — PROGRESS NOTES
"BRIEF NUTRITION REASSESSMENT    CURRENT DIET AND INTAKE:  Diet: Soft and Bite Sized + Thin Liquids (modified by SLP 4/21)              - Discussed with patient's RN. Low intakes the past several days up to 1 week, most likely in setting of altered mental status with medication changes.   - Notes indicate 0-25% of meals consumed, pt sometimes spitting bites back out rather than chewing and swallowing. Diet modified to hopefully make it easier to chew and swallow per SLP.     ANTHROPOMETRICS:  Height: 5' 6\"  Weight: No wt measured this admission.   Unable to calculate BMI  %IBW: 64.5 kg   Weight History:   Wt Readings from Last 10 Encounters:   02/23/25 114.9 kg (253 lb 6.4 oz)   02/20/24 104.3 kg (230 lb)   09/22/23 99.3 kg (219 lb)   02/17/23 97.5 kg (215 lb)   01/18/22 96.2 kg (212 lb)   01/04/22 96.7 kg (213 lb 1.6 oz)   04/15/21 93 kg (205 lb)   03/01/21 89.8 kg (198 lb)   01/25/21 89.8 kg (198 lb)   11/03/20 91.2 kg (201 lb)     LABS/MEDS/PHYSICAL FINDINGS:  Reviewed     - Last BM x1 on 4/18 (appears only BM recorded since 4/14). Senokot scheduled.   - No indication of PI    MALNUTRITION:  Visual Nutrition Focused Physical Assessment (NFPA) not completed. Do not suspect muscle/fat losses.  Unable to fully assess for malnutrition due to lack of recent wt trends.     % Weight Loss:  Unable to fully assess   % Intake:  </= 50% for >/= 5 days (severe malnutrition)  Subcutaneous Fat Loss:  Do not suspect muscle/fat losses.  Muscle Loss:  Do not suspect muscle/fat losses.  Fluid Retention:  Trace - does not meet criteria.     NUTRITION INTERVENTION:  Nutrition Diagnosis:  Inadequate oral intake related to altered mental status as evidenced by intakes of 0-25% of meals for up to 1 week.     Implementation:  Nutrition Education:  Not appropriate at this time due to patient condition  Medical Food Supplement: add Ensure BID w/ meals     FOLLOW UP/MONITORING:   Will re-evaluate in 7 - 10 days, or sooner, if " re-consulted.    Carol Godwin RD, LD  Pager: [phone]  Available on [employer]

## 2025-04-22 ENCOUNTER — APPOINTMENT (OUTPATIENT)
Dept: GENERAL RADIOLOGY | Facility: CLINIC | Age: 65
DRG: 056 | End: 2025-04-22
Attending: STUDENT IN AN ORGANIZED HEALTH CARE EDUCATION/TRAINING PROGRAM
Payer: MEDICARE

## 2025-04-22 ENCOUNTER — APPOINTMENT (OUTPATIENT)
Dept: CARDIOLOGY | Facility: CLINIC | Age: 65
End: 2025-04-22
Attending: STUDENT IN AN ORGANIZED HEALTH CARE EDUCATION/TRAINING PROGRAM
Payer: MEDICARE

## 2025-04-22 LAB
ALBUMIN SERPL BCG-MCNC: 3.3 G/DL (ref 3.5–5.2)
ALP SERPL-CCNC: 73 U/L (ref 40–150)
ALT SERPL W P-5'-P-CCNC: 29 U/L (ref 0–70)
ANION GAP SERPL CALCULATED.3IONS-SCNC: 9 MMOL/L (ref 7–15)
AST SERPL W P-5'-P-CCNC: 71 U/L (ref 0–45)
BASE EXCESS BLDV CALC-SCNC: 4.4 MMOL/L (ref -3–3)
BASE EXCESS BLDV CALC-SCNC: 5.5 MMOL/L (ref -3–3)
BILIRUB SERPL-MCNC: 0.8 MG/DL
BUN SERPL-MCNC: 23.7 MG/DL (ref 8–23)
CALCIUM SERPL-MCNC: 10.8 MG/DL (ref 8.8–10.4)
CHLORIDE SERPL-SCNC: 114 MMOL/L (ref 98–107)
CREAT SERPL-MCNC: 1.02 MG/DL (ref 0.67–1.17)
EGFRCR SERPLBLD CKD-EPI 2021: 81 ML/MIN/1.73M2
ERYTHROCYTE [DISTWIDTH] IN BLOOD BY AUTOMATED COUNT: 17.1 % (ref 10–15)
FERRITIN SERPL-MCNC: 885 NG/ML (ref 31–409)
GLUCOSE SERPL-MCNC: 93 MG/DL (ref 70–99)
HCO3 BLDV-SCNC: 29 MMOL/L (ref 21–28)
HCO3 BLDV-SCNC: 30 MMOL/L (ref 21–28)
HCO3 SERPL-SCNC: 27 MMOL/L (ref 22–29)
HCT VFR BLD AUTO: 39.1 % (ref 40–53)
HGB BLD-MCNC: 12.1 G/DL (ref 13.3–17.7)
IRON BINDING CAPACITY (ROCHE): 171 UG/DL (ref 240–430)
IRON SATN MFR SERPL: 28 % (ref 15–46)
IRON SERPL-MCNC: 48 UG/DL (ref 61–157)
LACTATE SERPL-SCNC: 0.8 MMOL/L (ref 0.7–2)
LVEF ECHO: NORMAL
MCH RBC QN AUTO: 19.2 PG (ref 26.5–33)
MCHC RBC AUTO-ENTMCNC: 30.9 G/DL (ref 31.5–36.5)
MCV RBC AUTO: 62 FL (ref 78–100)
O2/TOTAL GAS SETTING VFR VENT: 2 %
O2/TOTAL GAS SETTING VFR VENT: 4 %
OXYHGB MFR BLDV: 74 % (ref 70–75)
OXYHGB MFR BLDV: 83 % (ref 70–75)
PCO2 BLDV: 40 MM HG (ref 40–50)
PCO2 BLDV: 41 MM HG (ref 40–50)
PH BLDV: 7.46 [PH] (ref 7.32–7.43)
PH BLDV: 7.47 [PH] (ref 7.32–7.43)
PLATELET # BLD AUTO: 162 10E3/UL (ref 150–450)
PO2 BLDV: 41 MM HG (ref 25–47)
PO2 BLDV: 51 MM HG (ref 25–47)
POTASSIUM SERPL-SCNC: 4 MMOL/L (ref 3.4–5.3)
PROT SERPL-MCNC: 6.3 G/DL (ref 6.4–8.3)
RBC # BLD AUTO: 6.29 10E6/UL (ref 4.4–5.9)
SAO2 % BLDV: 75.4 % (ref 70–75)
SAO2 % BLDV: 84 % (ref 70–75)
SODIUM SERPL-SCNC: 149 MMOL/L (ref 135–145)
SODIUM SERPL-SCNC: 150 MMOL/L (ref 135–145)
TRANSFERRIN SERPL-MCNC: 152 MG/DL (ref 200–360)
WBC # BLD AUTO: 9.4 10E3/UL (ref 4–11)

## 2025-04-22 PROCEDURE — 120N000001 HC R&B MED SURG/OB

## 2025-04-22 PROCEDURE — 93308 TTE F-UP OR LMTD: CPT | Mod: 26 | Performed by: INTERNAL MEDICINE

## 2025-04-22 PROCEDURE — 82435 ASSAY OF BLOOD CHLORIDE: CPT | Performed by: STUDENT IN AN ORGANIZED HEALTH CARE EDUCATION/TRAINING PROGRAM

## 2025-04-22 PROCEDURE — 258N000003 HC RX IP 258 OP 636: Performed by: STUDENT IN AN ORGANIZED HEALTH CARE EDUCATION/TRAINING PROGRAM

## 2025-04-22 PROCEDURE — 93321 DOPPLER ECHO F-UP/LMTD STD: CPT | Mod: 26 | Performed by: INTERNAL MEDICINE

## 2025-04-22 PROCEDURE — 84466 ASSAY OF TRANSFERRIN: CPT | Performed by: STUDENT IN AN ORGANIZED HEALTH CARE EDUCATION/TRAINING PROGRAM

## 2025-04-22 PROCEDURE — 82805 BLOOD GASES W/O2 SATURATION: CPT | Performed by: STUDENT IN AN ORGANIZED HEALTH CARE EDUCATION/TRAINING PROGRAM

## 2025-04-22 PROCEDURE — 255N000002 HC RX 255 OP 636: Performed by: STUDENT IN AN ORGANIZED HEALTH CARE EDUCATION/TRAINING PROGRAM

## 2025-04-22 PROCEDURE — 250N000013 HC RX MED GY IP 250 OP 250 PS 637: Performed by: INTERNAL MEDICINE

## 2025-04-22 PROCEDURE — 36415 COLL VENOUS BLD VENIPUNCTURE: CPT | Performed by: STUDENT IN AN ORGANIZED HEALTH CARE EDUCATION/TRAINING PROGRAM

## 2025-04-22 PROCEDURE — 83605 ASSAY OF LACTIC ACID: CPT | Performed by: STUDENT IN AN ORGANIZED HEALTH CARE EDUCATION/TRAINING PROGRAM

## 2025-04-22 PROCEDURE — 82728 ASSAY OF FERRITIN: CPT | Performed by: STUDENT IN AN ORGANIZED HEALTH CARE EDUCATION/TRAINING PROGRAM

## 2025-04-22 PROCEDURE — 250N000011 HC RX IP 250 OP 636: Performed by: NURSE PRACTITIONER

## 2025-04-22 PROCEDURE — 84295 ASSAY OF SERUM SODIUM: CPT | Performed by: STUDENT IN AN ORGANIZED HEALTH CARE EDUCATION/TRAINING PROGRAM

## 2025-04-22 PROCEDURE — 250N000013 HC RX MED GY IP 250 OP 250 PS 637: Performed by: STUDENT IN AN ORGANIZED HEALTH CARE EDUCATION/TRAINING PROGRAM

## 2025-04-22 PROCEDURE — 250N000011 HC RX IP 250 OP 636: Performed by: STUDENT IN AN ORGANIZED HEALTH CARE EDUCATION/TRAINING PROGRAM

## 2025-04-22 PROCEDURE — 250N000013 HC RX MED GY IP 250 OP 250 PS 637: Performed by: HOSPITALIST

## 2025-04-22 PROCEDURE — 71045 X-RAY EXAM CHEST 1 VIEW: CPT

## 2025-04-22 PROCEDURE — 93325 DOPPLER ECHO COLOR FLOW MAPG: CPT | Mod: 26 | Performed by: INTERNAL MEDICINE

## 2025-04-22 PROCEDURE — 85027 COMPLETE CBC AUTOMATED: CPT | Performed by: STUDENT IN AN ORGANIZED HEALTH CARE EDUCATION/TRAINING PROGRAM

## 2025-04-22 PROCEDURE — 83550 IRON BINDING TEST: CPT | Performed by: STUDENT IN AN ORGANIZED HEALTH CARE EDUCATION/TRAINING PROGRAM

## 2025-04-22 PROCEDURE — 99232 SBSQ HOSP IP/OBS MODERATE 35: CPT | Performed by: STUDENT IN AN ORGANIZED HEALTH CARE EDUCATION/TRAINING PROGRAM

## 2025-04-22 PROCEDURE — C8924 2D TTE W OR W/O FOL W/CON,FU: HCPCS

## 2025-04-22 RX ORDER — CEFUROXIME AXETIL 500 MG/1
500 TABLET ORAL EVERY 12 HOURS SCHEDULED
Status: DISCONTINUED | OUTPATIENT
Start: 2025-04-22 | End: 2025-04-22

## 2025-04-22 RX ORDER — CEFTRIAXONE 2 G/1
2 INJECTION, POWDER, FOR SOLUTION INTRAMUSCULAR; INTRAVENOUS EVERY 24 HOURS
Status: DISCONTINUED | OUTPATIENT
Start: 2025-04-22 | End: 2025-04-23

## 2025-04-22 RX ORDER — DEXTROSE MONOHYDRATE 50 MG/ML
INJECTION, SOLUTION INTRAVENOUS CONTINUOUS
Status: DISCONTINUED | OUTPATIENT
Start: 2025-04-22 | End: 2025-04-22

## 2025-04-22 RX ORDER — ENOXAPARIN SODIUM 100 MG/ML
40 INJECTION SUBCUTANEOUS EVERY 12 HOURS
Status: DISCONTINUED | OUTPATIENT
Start: 2025-04-22 | End: 2025-05-06

## 2025-04-22 RX ORDER — BISACODYL 10 MG
10 SUPPOSITORY, RECTAL RECTAL ONCE
Status: COMPLETED | OUTPATIENT
Start: 2025-04-22 | End: 2025-04-22

## 2025-04-22 RX ORDER — DEXTROSE MONOHYDRATE 50 MG/ML
INJECTION, SOLUTION INTRAVENOUS CONTINUOUS
Status: DISCONTINUED | OUTPATIENT
Start: 2025-04-22 | End: 2025-04-23

## 2025-04-22 RX ADMIN — DIVALPROEX SODIUM 1000 MG: 125 CAPSULE, COATED PELLETS ORAL at 11:39

## 2025-04-22 RX ADMIN — BISACODYL 10 MG: 10 SUPPOSITORY RECTAL at 11:04

## 2025-04-22 RX ADMIN — METOPROLOL SUCCINATE 50 MG: 50 TABLET, EXTENDED RELEASE ORAL at 11:33

## 2025-04-22 RX ADMIN — CLONIDINE HYDROCHLORIDE 0.1 MG: 0.1 TABLET ORAL at 11:33

## 2025-04-22 RX ADMIN — LISINOPRIL 40 MG: 40 TABLET ORAL at 11:34

## 2025-04-22 RX ADMIN — ACETAMINOPHEN 650 MG: 650 SUPPOSITORY RECTAL at 10:11

## 2025-04-22 RX ADMIN — CINACALCET 30 MG: 30 TABLET ORAL at 11:34

## 2025-04-22 RX ADMIN — CEFTRIAXONE SODIUM 2 G: 2 INJECTION, POWDER, FOR SOLUTION INTRAMUSCULAR; INTRAVENOUS at 12:17

## 2025-04-22 RX ADMIN — DEXTROSE MONOHYDRATE: 50 INJECTION, SOLUTION INTRAVENOUS at 17:25

## 2025-04-22 RX ADMIN — AMLODIPINE BESYLATE 10 MG: 10 TABLET ORAL at 11:33

## 2025-04-22 RX ADMIN — DEXTROSE: 5 SOLUTION INTRAVENOUS at 08:59

## 2025-04-22 RX ADMIN — ENOXAPARIN SODIUM 40 MG: 40 INJECTION SUBCUTANEOUS at 22:54

## 2025-04-22 RX ADMIN — PERFLUTREN 10 ML: 6.52 INJECTION, SUSPENSION INTRAVENOUS at 12:27

## 2025-04-22 RX ADMIN — PIPERACILLIN AND TAZOBACTAM 4.5 G: 4; .5 INJECTION, POWDER, FOR SOLUTION INTRAVENOUS at 03:37

## 2025-04-22 ASSESSMENT — ACTIVITIES OF DAILY LIVING (ADL)
ADLS_ACUITY_SCORE: 72
ADLS_ACUITY_SCORE: 72
ADLS_ACUITY_SCORE: 74
ADLS_ACUITY_SCORE: 72
ADLS_ACUITY_SCORE: 74
ADLS_ACUITY_SCORE: 72
ADLS_ACUITY_SCORE: 74
ADLS_ACUITY_SCORE: 74
ADLS_ACUITY_SCORE: 72
ADLS_ACUITY_SCORE: 76
ADLS_ACUITY_SCORE: 74
ADLS_ACUITY_SCORE: 74
ADLS_ACUITY_SCORE: 72
ADLS_ACUITY_SCORE: 72
ADLS_ACUITY_SCORE: 74
ADLS_ACUITY_SCORE: 72
ADLS_ACUITY_SCORE: 74

## 2025-04-22 NOTE — PROGRESS NOTES
[facility]    Medicine Progress Note - Hospitalist Service    Date of Admission:  4/4/2025    Assessment & Plan   Estevan Aaron is a 64 year old male with PMH of HTN, HLD, hyperparathyroidism, dementia who was admitted on 4/4/25 for aggressive behaviors at his care facility.     Acute toxic metabolic encephalopathy, multifactorial, improving  Acute hypoxic hypercarbic respiratory failure, improving  Concern for aspiration PNA vs pneumonitis  Sepsis w/ elevated lactate, resolved  Respiratory acidosis w/ metabolic compensation, resolved  The house team was called early morning on 4/20/25 due patient sleeping since 4/19/25 morning along with development of congested cough, hypoxia to 84%, and temp 100.7F. On exam, somnolence noted without associated respiratory distress, other concerns. Briefly required BiPAP and quickly weaned to O2 NC. CXR without opacity, although some concern for acute aspiration event in setting of suspected medication-mediated somnolence. Mental status slowly improving as psychiatric meds are held.  - O2 goal >90%, wean oxygen as able  - Switch to ceftriaxone   +Plan to transition to PO when safe to do so  - Continue holding furosemide  - Psychiatric medications as below    Alzheimer's dementia with behavioral disturbance  Reportedly aggressive behaviors at [facility]. Was calm and cooperative with staff in the ED, although significant behavioral disturbance noted early this admission. History of inpatient psychiatric hospitaliztion 10/2024-late 2/2025. Ongoing medication titration per psychiatry, now with somnolence.  - Continue 1:1  - Psych re-consulted               - Continue Depakote 100mg BID               - Holding scheduled seroquel               - Continue PRN Seroquel  - SLP following, cleared for regular diet    Hypernatremia, improving  In setting of poor PO intake over recent days.  - Continue D5-NS 125ml/hr overnight  - Daily CMP    Elevated AST,  improving  Unclear etiology, no history of liver disease or failure. Possibly in setting of statin.  - Continue holding statin  - Daily CMP     Suspected ADAM - No formal sleep study, although high suspicion. Outpatient sleep center referral on discharge  HTN - Continue PTA amlodipine, clonidine, lisinopril, metoprolol   HLD - Holding statin given liver dysfunction  Hyperparathyroidism - Continue cinacalcet  Hypernatremia, mild, resolved          Diet: Combination Diet Regular Diet; Safe Tray - NO utensils; Soft and Bite Sized Diet (level 6); Thin Liquids (level 0)  Snacks/Supplements Adult: Ensure Enlive; With Meals  Room Service    DVT Prophylaxis: Enoxaparin (Lovenox) SQ  Alberto Catheter: Not present  Lines: None     Cardiac Monitoring: None  Code Status: No CPR- Do NOT Intubate      Clinically Significant Risk Factors         # Hypernatremia: Highest Na = 150 mmol/L in last 2 days, will monitor as appropriate  # Hyperchloremia: Highest Cl = 114 mmol/L in last 2 days, will monitor as appropriate       # Hypercalcemia: Highest Ca = 10.8 mg/dL in last 2 days, will monitor as appropriate    # Hypoalbuminemia: Lowest albumin = 3.3 g/dL at 4/22/2025  6:09 AM, will monitor as appropriate     # Hypertension: Noted on problem list     # Acute Hypercapnic Respiratory Failure: based on venous blood gas results.  Continue supplemental oxygen and ventilatory support as indicated.  # Dementia: noted on problem list            # Financial/Environmental Concerns: none         Social Drivers of Health    Food Insecurity: Unknown (4/10/2025)    Food Insecurity     Within the past 12 months, did you worry that your food would run out before you got money to buy more?: Patient unable to answer     Within the past 12 months, did the food you bought just not last and you didn t have money to get more?: Patient unable to answer   Housing Stability: Unknown (4/10/2025)    Housing Stability     Do you have housing? : Patient unable to  answer     Are you worried about losing your housing?: Patient unable to answer   Tobacco Use: High Risk (10/28/2024)    Patient History     Smoking Tobacco Use: Every Day     Smokeless Tobacco Use: Never   Financial Resource Strain: Unknown (4/10/2025)    Financial Resource Strain     Within the past 12 months, have you or your family members you live with been unable to get utilities (heat, electricity) when it was really needed?: Patient unable to answer   Transportation Needs: Unknown (4/10/2025)    Transportation Needs     Within the past 12 months, has lack of transportation kept you from medical appointments, getting your medicines, non-medical meetings or appointments, work, or from getting things that you need?: Patient unable to answer   Physical Activity: Insufficiently Active (2/20/2024)    Exercise Vital Sign     Days of Exercise per Week: 2 days     Minutes of Exercise per Session: 10 min   Interpersonal Safety: Unknown (4/10/2025)    Interpersonal Safety     Do you feel physically and emotionally safe where you currently live?: Patient unable to answer     Within the past 12 months, have you been hit, slapped, kicked or otherwise physically hurt by someone?: Patient unable to answer     Within the past 12 months, have you been humiliated or emotionally abused in other ways by your partner or ex-partner?: Patient unable to answer   Stress: Stress Concern Present (2/20/2024)    [place] [employer] - Occupational Stress Questionnaire     Feeling of Stress : To some extent   Social Connections: Unknown (2/20/2024)    Social Connection and Isolation Panel [NHANES]     Frequency of Social Gatherings with Friends and Family: Once a week          Disposition Plan     Medically Ready for Discharge: Anticipated Tomorrow     Troy Farley MD  Hospitalist Service  [facility]  Securely message with [employer] (more info)  Text page via [facility] Paging/Directory    ______________________________________________________________________    Interval History   Somnolent this morning, although slowly waking up later in the afternoon. Denies pain. Attempted to reach guardian via phone but no answer.     Physical Exam   Vital Signs: Temp: 99.6  F (37.6  C) Temp src: Rectal BP: (!) 160/80 Pulse: 76   Resp: 20 SpO2: 93 % O2 Device: Nasal cannula Oxygen Delivery: 2 LPM  Weight: 0 lbs 0 oz    General: Awake but somnolent, swearing, NAD, sitting upright in chair  HEENT: Atraumatic, normocephalic, EOMI, no scleral icterus  CV: RRR, no murmurs, no MARIA ANTONIA, distal pulses intact  Pulm: CTAB, breathing comfortably on 1L NC, no wheezes, no crackles  Abd: Soft, non-tender, non-distended  Skin: No rashes, lesions, or wounds visualized  Neuro: AOx0, withdraws to pain, moving all extremities spontaneously, speech garbled and non-sensical    Medical Decision Making       45 MINUTES SPENT BY ME on the date of service doing chart review, history, exam, documentation & further activities per the note.      Data   ------------------------- PAST 24 HR DATA REVIEWED -----------------------------------------------

## 2025-04-22 NOTE — PROGRESS NOTES
"Care Management Follow Up    Length of Stay (days): 15    Expected Discharge Date: 04/24/2025     Concerns to be Addressed: discharge planning     Patient plan of care discussed at interdisciplinary rounds: Yes    Anticipated Discharge Disposition:       Anticipated Discharge Services:    Anticipated Discharge DME:      Patient/family educated on Medicare website which has current facility and service quality ratings:    Education Provided on the Discharge Plan:    Patient/Family in Agreement with the Plan: yes    Referrals Placed by CM/SW:    Private pay costs discussed: Not applicable    Discussed  Partnership in Safe Discharge Planning  document with patient/family: No     Handoff Completed: No, handoff not indicated or clinically appropriate    Additional Information:  Received call from Vera at [facility]. Vera states that guardian Clifford has agreed to doing a 1:1 caregiver at [place], which would need to be privately paid for, but funds are limited so this is not something they can do long-term. Per Vera, pt will likely need 24/7 supervision and a sitter longer than Clifford will be able to pay for. Vera stated this is not them saying they can't accept pt back, but they do not feel they are the right setting for patient and are likely going to go through their pre-eviction process. Discussed with Vera that pt is not medically stable at this time, so it's hard to say exactly what the needs will be at discharge now. Vera wondered about LTC placement, which SW stated they likely would not consider pt at this time due to a 1:1 sitter consistently in the hospital. Informed Vera that as of today, pt is not ready, so we can continue to update them as we know more. Vera said, \"this is all mental health, not dementia, where does someone like that go?\" Vera suggested [facility] in [place]. DEBORAH also mentioned [facility] and this could be a potential. Informed eVra it " would be difficult to send any new referrals because pt is not stable at this time, so difficult to know what needs pt will have at discharge.    Ofelia Abrams, MELIDAW  Social Work  [facility]

## 2025-04-22 NOTE — PROVIDER NOTIFICATION
"MD Notification    Notified Person: MD    Notified Person Name: Troy Farley MD    Notification Date/Time: 1812    Notification Interaction: Vocera text page    Purpose of Notification: \"Update: Pt remains somnolent, more so then before, unable to eat dinner. O2 increased to 4L on oxymask.\"    Orders Received: [facility] draw, update provider with results.    Comments:   "

## 2025-04-22 NOTE — PLAN OF CARE
PRIMARY Concern: Presents with aggressive behavior from his [facility],Hx of dementia w/ aggression.Somnolent.   SAFETY RISK Concerns (fall risk, behaviors, etc.): Behavior- green      Aggression Tool Color: yellow  Isolation/Type: n/a  Tests/Procedures for NEXT shift:   Consults? (Pending/following, signed-off?) Psych ,SW/SLP following   Where is patient from? (Home, TCU, etc.):[facility], can't take him back  Other Important info for NEXT shift: Up to recliner today.  Anticipated DC date & active delays: TBD- sw Following.      SUMMARY NOTE:  Orientation/Cognitive: Confused, unable to assess orientation.   Observation Goals (Met/ Not Met): Inpatient  Mobility Level/Assist Equipment: A2/GB/W  Antibiotics & Plan (IV/po, length of tx left): Zosyn q6hr for PNA.   Pain Management: No s/sx of pain noted or reported.   Tele/VS/O2: Soft BP, refer to BP med parameter. Continues to need o2 @ 2L NC. Drops to 83%RA. Wean as able. Tmax 99.Tele: SR  ABNL Lab/BG: AST & WBC are trending down.   Diet: Start on Soft and Bite Sized Diet (level 6) w/ thin liquid on 4/21 by SLP. Assist w/ feeding. Poor appetite.   Bowel/Bladder: Incontinent of urine this shift.   Skin Concerns: Scab to lower extremity, abrasion/excoriation  and redness back and rt shin   Drains/Devices:PIV s/l  w/ intermittent abx.   Patient Stated Goal for Today:NARESH.

## 2025-04-22 NOTE — PLAN OF CARE
PRIMARY Concern: Originally presented with aggressive behavior from his [facility],Hx of dementia w/ aggression.  Now pt somnolent w/fever and concern of aspiration pneumonia.    SAFETY RISK Concerns (fall risk, behaviors, etc.): Aggression & fall risk      Aggression Tool Color: yellow  Isolation/Type: n/a  Tests/Procedures for NEXT shift:   Consults? (Pending/following, signed-off?) Psych ,SW/SLP following   Where is patient from? (Home, TCU, etc.): [facility].  Other Important info for NEXT shift:   Anticipated DC date & active delays: TBD- sw Following.      SUMMARY NOTE:  Orientation/Cognitive: Confused, unable to assess orientation.   Observation Goals (Met/ Not Met): Inpatient  Mobility Level/Assist Equipment: A2 Kyung steady  Antibiotics & Plan (IV/po, length of tx left): IV Rocephin   Pain Management: No s/sx of pain noted or reported.   Tele/VS/O2: VSS on 2L NC unable to wean at this point.   ABNL Lab/BG: WBC now: 9.4. NA: 149. VBG lab ordered (to be collected)  Diet: Start on Soft and Bite Sized Diet (level 6) w/ thin liquid on 4/21 by SLP. Assist w/ feeding. Poor appetite.   Bowel/Bladder: Incontinent of urine. LBM:4/18. Suppository given this AM   Skin Concerns: Scab to lower extremity, abrasion/excoriation  and redness back and rt shin. Mepilex in place on back & Sacrum/Coccyx   Drains/Devices:PIV: Infusing D5 @125/hr  Patient Stated Goal for Today:NARESH.

## 2025-04-23 ENCOUNTER — APPOINTMENT (OUTPATIENT)
Dept: SPEECH THERAPY | Facility: CLINIC | Age: 65
End: 2025-04-23
Attending: PSYCHIATRY & NEUROLOGY
Payer: MEDICARE

## 2025-04-23 LAB
ALBUMIN SERPL BCG-MCNC: 3.3 G/DL (ref 3.5–5.2)
ALP SERPL-CCNC: 79 U/L (ref 40–150)
ALT SERPL W P-5'-P-CCNC: 37 U/L (ref 0–70)
ANION GAP SERPL CALCULATED.3IONS-SCNC: 11 MMOL/L (ref 7–15)
AST SERPL W P-5'-P-CCNC: 74 U/L (ref 0–45)
BILIRUB SERPL-MCNC: 0.7 MG/DL
BUN SERPL-MCNC: 17.7 MG/DL (ref 8–23)
CALCIUM SERPL-MCNC: 10.3 MG/DL (ref 8.8–10.4)
CHLORIDE SERPL-SCNC: 109 MMOL/L (ref 98–107)
CREAT SERPL-MCNC: 0.82 MG/DL (ref 0.67–1.17)
EGFRCR SERPLBLD CKD-EPI 2021: >90 ML/MIN/1.73M2
ERYTHROCYTE [DISTWIDTH] IN BLOOD BY AUTOMATED COUNT: 17.3 % (ref 10–15)
GLUCOSE SERPL-MCNC: 112 MG/DL (ref 70–99)
HCO3 SERPL-SCNC: 25 MMOL/L (ref 22–29)
HCT VFR BLD AUTO: 41.1 % (ref 40–53)
HGB BLD-MCNC: 12.6 G/DL (ref 13.3–17.7)
LACTATE SERPL-SCNC: 0.9 MMOL/L (ref 0.7–2)
MCH RBC QN AUTO: 19.3 PG (ref 26.5–33)
MCHC RBC AUTO-ENTMCNC: 30.7 G/DL (ref 31.5–36.5)
MCV RBC AUTO: 63 FL (ref 78–100)
PLATELET # BLD AUTO: 167 10E3/UL (ref 150–450)
POTASSIUM SERPL-SCNC: 3.5 MMOL/L (ref 3.4–5.3)
PROT SERPL-MCNC: 6.3 G/DL (ref 6.4–8.3)
RBC # BLD AUTO: 6.54 10E6/UL (ref 4.4–5.9)
SODIUM SERPL-SCNC: 141 MMOL/L (ref 135–145)
SODIUM SERPL-SCNC: 145 MMOL/L (ref 135–145)
WBC # BLD AUTO: 9.4 10E3/UL (ref 4–11)

## 2025-04-23 PROCEDURE — 250N000011 HC RX IP 250 OP 636: Performed by: STUDENT IN AN ORGANIZED HEALTH CARE EDUCATION/TRAINING PROGRAM

## 2025-04-23 PROCEDURE — 85027 COMPLETE CBC AUTOMATED: CPT | Performed by: STUDENT IN AN ORGANIZED HEALTH CARE EDUCATION/TRAINING PROGRAM

## 2025-04-23 PROCEDURE — 250N000013 HC RX MED GY IP 250 OP 250 PS 637: Performed by: HOSPITALIST

## 2025-04-23 PROCEDURE — 120N000001 HC R&B MED SURG/OB

## 2025-04-23 PROCEDURE — 99232 SBSQ HOSP IP/OBS MODERATE 35: CPT | Performed by: STUDENT IN AN ORGANIZED HEALTH CARE EDUCATION/TRAINING PROGRAM

## 2025-04-23 PROCEDURE — 250N000013 HC RX MED GY IP 250 OP 250 PS 637: Performed by: STUDENT IN AN ORGANIZED HEALTH CARE EDUCATION/TRAINING PROGRAM

## 2025-04-23 PROCEDURE — 83605 ASSAY OF LACTIC ACID: CPT | Performed by: STUDENT IN AN ORGANIZED HEALTH CARE EDUCATION/TRAINING PROGRAM

## 2025-04-23 PROCEDURE — 84295 ASSAY OF SERUM SODIUM: CPT | Performed by: STUDENT IN AN ORGANIZED HEALTH CARE EDUCATION/TRAINING PROGRAM

## 2025-04-23 PROCEDURE — 250N000013 HC RX MED GY IP 250 OP 250 PS 637: Performed by: INTERNAL MEDICINE

## 2025-04-23 PROCEDURE — 36415 COLL VENOUS BLD VENIPUNCTURE: CPT | Performed by: STUDENT IN AN ORGANIZED HEALTH CARE EDUCATION/TRAINING PROGRAM

## 2025-04-23 PROCEDURE — 84155 ASSAY OF PROTEIN SERUM: CPT | Performed by: STUDENT IN AN ORGANIZED HEALTH CARE EDUCATION/TRAINING PROGRAM

## 2025-04-23 PROCEDURE — 258N000003 HC RX IP 258 OP 636: Performed by: STUDENT IN AN ORGANIZED HEALTH CARE EDUCATION/TRAINING PROGRAM

## 2025-04-23 PROCEDURE — 92526 ORAL FUNCTION THERAPY: CPT | Mod: GN | Performed by: SPEECH-LANGUAGE PATHOLOGIST

## 2025-04-23 RX ORDER — DEXTROSE MONOHYDRATE 50 MG/ML
INJECTION, SOLUTION INTRAVENOUS CONTINUOUS
Status: ACTIVE | OUTPATIENT
Start: 2025-04-23 | End: 2025-04-23

## 2025-04-23 RX ADMIN — ENOXAPARIN SODIUM 40 MG: 40 INJECTION SUBCUTANEOUS at 21:21

## 2025-04-23 RX ADMIN — AMLODIPINE BESYLATE 10 MG: 10 TABLET ORAL at 11:24

## 2025-04-23 RX ADMIN — CEFTRIAXONE SODIUM 2 G: 2 INJECTION, POWDER, FOR SOLUTION INTRAMUSCULAR; INTRAVENOUS at 12:44

## 2025-04-23 RX ADMIN — ACETAMINOPHEN 650 MG: 325 TABLET, FILM COATED ORAL at 21:36

## 2025-04-23 RX ADMIN — CLONIDINE HYDROCHLORIDE 0.1 MG: 0.1 TABLET ORAL at 21:21

## 2025-04-23 RX ADMIN — DEXTROSE MONOHYDRATE: 50 INJECTION, SOLUTION INTRAVENOUS at 01:32

## 2025-04-23 RX ADMIN — MELATONIN TAB 3 MG 3 MG: 3 TAB at 21:21

## 2025-04-23 RX ADMIN — QUETIAPINE FUMARATE 25 MG: 25 TABLET ORAL at 05:33

## 2025-04-23 RX ADMIN — DEXTROSE: 5 SOLUTION INTRAVENOUS at 09:27

## 2025-04-23 RX ADMIN — LISINOPRIL 40 MG: 40 TABLET ORAL at 11:24

## 2025-04-23 RX ADMIN — DIVALPROEX SODIUM 1000 MG: 500 TABLET, FILM COATED, EXTENDED RELEASE ORAL at 21:21

## 2025-04-23 RX ADMIN — CLONIDINE HYDROCHLORIDE 0.1 MG: 0.1 TABLET ORAL at 11:24

## 2025-04-23 RX ADMIN — ENOXAPARIN SODIUM 40 MG: 40 INJECTION SUBCUTANEOUS at 11:32

## 2025-04-23 ASSESSMENT — ACTIVITIES OF DAILY LIVING (ADL)
ADLS_ACUITY_SCORE: 81
ADLS_ACUITY_SCORE: 76
ADLS_ACUITY_SCORE: 77
ADLS_ACUITY_SCORE: 81
ADLS_ACUITY_SCORE: 77
ADLS_ACUITY_SCORE: 81
ADLS_ACUITY_SCORE: 77
ADLS_ACUITY_SCORE: 81
ADLS_ACUITY_SCORE: 77
ADLS_ACUITY_SCORE: 77
ADLS_ACUITY_SCORE: 81
ADLS_ACUITY_SCORE: 81
ADLS_ACUITY_SCORE: 77
ADLS_ACUITY_SCORE: 76
ADLS_ACUITY_SCORE: 77
ADLS_ACUITY_SCORE: 77
ADLS_ACUITY_SCORE: 81
ADLS_ACUITY_SCORE: 77
ADLS_ACUITY_SCORE: 81

## 2025-04-23 NOTE — PROGRESS NOTES
Medicine Progress Note - Hospitalist Service    Date of Admission:  4/4/2025    Assessment & Plan   Estevan Aaron is a 64 year old male with PMH of HTN, HLD, hyperparathyroidism, dementia who was admitted on 4/4/25 for aggressive behaviors at his care facility.     Acute toxic metabolic encephalopathy, multifactorial, improving  Acute hypoxic hypercarbic respiratory failure, resolved  Concern for aspiration PNA vs pneumonitis  Sepsis w/ elevated lactate, resolved  Respiratory acidosis w/ metabolic compensation, resolved  The house team was called early morning on 4/20/25 due patient sleeping since 4/19/25 morning along with development of congested cough, hypoxia to 84%, and temp 100.7F. On exam, somnolence noted without associated respiratory distress, other concerns. Briefly required BiPAP and quickly weaned to O2 NC. CXR without opacity, although some concern for acute aspiration event in setting of suspected medication-mediated somnolence. Mental status slowly improving as psychiatric meds are held.  - O2 goal >90%, currently on RA  - Transition to augmentin on 4/24 x2 doses  - Continue holding furosemide  - Psychiatric medications as below    Alzheimer's dementia with behavioral disturbance  Reportedly aggressive behaviors at [facility]. Was calm and cooperative with staff in the ED, although significant behavioral disturbance noted early this admission. History of inpatient psychiatric hospitaliztion 10/2024-late 2/2025. Ongoing medication titration per psychiatry, now with somnolence.   - Continue 1:1  - Continue Depakote 100mg BID  - Holding scheduled seroquel  - Continue PRN Seroquel   + Discussed with nursing, should only be used for severe agitation  - SLP following, cleared for regular diet    Hypernatremia, improving  In setting of poor PO intake over recent days.  - Continue D5-NS 125ml/hr  - Repeat Na 1600  - Daily CMP    Elevated AST, improving  Unclear  etiology, no history of liver disease or failure. Possibly in setting of statin.  - Continue holding statin  - Daily CMP     Suspected ADAM - No formal sleep study, although high suspicion. Outpatient sleep center referral on discharge  HTN - Continue PTA amlodipine, clonidine, lisinopril, metoprolol   HLD - Holding statin given liver dysfunction  Hyperparathyroidism - Continue cinacalcet  Hypernatremia, mild, resolved          Diet: Snacks/Supplements Adult: Ensure Enlive; With Meals  Room Service  Combination Diet Safe Tray - NO utensils; Soft and Bite Sized Diet (level 6); Thin Liquids (level 0)    DVT Prophylaxis: Enoxaparin (Lovenox) SQ  Alberto Catheter: Not present  Lines: None     Cardiac Monitoring: None  Code Status: No CPR- Do NOT Intubate      Clinically Significant Risk Factors         # Hypernatremia: Highest Na = 150 mmol/L in last 2 days, will monitor as appropriate  # Hyperchloremia: Highest Cl = 114 mmol/L in last 2 days, will monitor as appropriate       # Hypercalcemia: Highest Ca = 10.8 mg/dL in last 2 days, will monitor as appropriate    # Hypoalbuminemia: Lowest albumin = 3.3 g/dL at 4/23/2025  6:19 AM, will monitor as appropriate     # Hypertension: Noted on problem list     # Acute Hypercapnic Respiratory Failure: based on venous blood gas results.  Continue supplemental oxygen and ventilatory support as indicated.  # Dementia: noted on problem list            # Financial/Environmental Concerns: none         Social Drivers of Health    Food Insecurity: Unknown (4/10/2025)    Food Insecurity     Within the past 12 months, did you worry that your food would run out before you got money to buy more?: Patient unable to answer     Within the past 12 months, did the food you bought just not last and you didn t have money to get more?: Patient unable to answer   Housing Stability: Unknown (4/10/2025)    Housing Stability     Do you have housing? : Patient unable to answer     Are you worried about  losing your housing?: Patient unable to answer   Tobacco Use: High Risk (10/28/2024)    Patient History     Smoking Tobacco Use: Every Day     Smokeless Tobacco Use: Never   Financial Resource Strain: Unknown (4/10/2025)    Financial Resource Strain     Within the past 12 months, have you or your family members you live with been unable to get utilities (heat, electricity) when it was really needed?: Patient unable to answer   Transportation Needs: Unknown (4/10/2025)    Transportation Needs     Within the past 12 months, has lack of transportation kept you from medical appointments, getting your medicines, non-medical meetings or appointments, work, or from getting things that you need?: Patient unable to answer   Physical Activity: Insufficiently Active (2/20/2024)    Exercise Vital Sign     Days of Exercise per Week: 2 days     Minutes of Exercise per Session: 10 min   Interpersonal Safety: Unknown (4/10/2025)    Interpersonal Safety     Do you feel physically and emotionally safe where you currently live?: Patient unable to answer     Within the past 12 months, have you been hit, slapped, kicked or otherwise physically hurt by someone?: Patient unable to answer     Within the past 12 months, have you been humiliated or emotionally abused in other ways by your partner or ex-partner?: Patient unable to answer   Stress: Stress Concern Present (2/20/2024)    [employer] - Occupational Stress Questionnaire     Feeling of Stress : To some extent   Social Connections: Unknown (2/20/2024)    Social Connection and Isolation Panel [NHANES]     Frequency of Social Gatherings with Friends and Family: Once a week          Disposition Plan     Medically Ready for Discharge: Anticipated Tomorrow     Troy Farley MD  Hospitalist Service  [facility]  Securely message with [employer] (more info)  Text page via [facility] Paging/Directory    ______________________________________________________________________    Interval History   Somnolent this morning, however, much more awake/alert this afternoon.  Up to chair, however, refused to stand up with staff today.  Nonsensical thought content.  Received dose of as needed Seroquel with notable somnolence overnight.  Discussed with guardian Maria C today and will continue to update each day.    Physical Exam   Vital Signs: Temp: 99  F (37.2  C) Temp src: Axillary BP: (!) 152/81 Pulse: 68   Resp: 21 SpO2: (!) 91 % O2 Device: None (Room air) Oxygen Delivery: 4 LPM  Weight: 0 lbs 0 oz    General: Awake but somnolent, swearing, NAD, sitting upright in chair  HEENT: Atraumatic, normocephalic, EOMI, no scleral icterus  CV: RRR, no murmurs, no MARIA ANTONIA, distal pulses intact  Pulm: CTAB, breathing comfortably on RA, no wheezes, no crackles  Abd: Soft, non-tender, non-distended  Skin: No rashes, lesions, or wounds visualized  Neuro: AOx0, withdraws to pain, moving all extremities spontaneously, speech garbled and non-sensical    Medical Decision Making       45 MINUTES SPENT BY ME on the date of service doing chart review, history, exam, documentation & further activities per the note.      Data   ------------------------- PAST 24 HR DATA REVIEWED -----------------------------------------------

## 2025-04-24 ENCOUNTER — APPOINTMENT (OUTPATIENT)
Dept: MRI IMAGING | Facility: CLINIC | Age: 65
End: 2025-04-24
Attending: STUDENT IN AN ORGANIZED HEALTH CARE EDUCATION/TRAINING PROGRAM
Payer: MEDICARE

## 2025-04-24 LAB
ALBUMIN SERPL BCG-MCNC: 3.4 G/DL (ref 3.5–5.2)
ALBUMIN UR-MCNC: 20 MG/DL
ALP SERPL-CCNC: 97 U/L (ref 40–150)
ALT SERPL W P-5'-P-CCNC: 43 U/L (ref 0–70)
ANION GAP SERPL CALCULATED.3IONS-SCNC: 11 MMOL/L (ref 7–15)
APPEARANCE UR: CLEAR
AST SERPL W P-5'-P-CCNC: 69 U/L (ref 0–45)
BACTERIA BLD CULT: NORMAL
BACTERIA BLD CULT: NORMAL
BILIRUB SERPL-MCNC: 0.6 MG/DL
BILIRUB UR QL STRIP: NEGATIVE
BUN SERPL-MCNC: 14.1 MG/DL (ref 8–23)
CALCIUM SERPL-MCNC: 10.9 MG/DL (ref 8.8–10.4)
CHLORIDE SERPL-SCNC: 105 MMOL/L (ref 98–107)
COLOR UR AUTO: YELLOW
CREAT SERPL-MCNC: 0.82 MG/DL (ref 0.67–1.17)
EGFRCR SERPLBLD CKD-EPI 2021: >90 ML/MIN/1.73M2
GLUCOSE SERPL-MCNC: 116 MG/DL (ref 70–99)
GLUCOSE UR STRIP-MCNC: NEGATIVE MG/DL
HCO3 SERPL-SCNC: 25 MMOL/L (ref 22–29)
HGB UR QL STRIP: NEGATIVE
KETONES UR STRIP-MCNC: 20 MG/DL
LACTATE SERPL-SCNC: 0.8 MMOL/L (ref 0.7–2)
LEUKOCYTE ESTERASE UR QL STRIP: NEGATIVE
NITRATE UR QL: NEGATIVE
PH UR STRIP: 7.5 [PH] (ref 5–7)
POTASSIUM SERPL-SCNC: 4 MMOL/L (ref 3.4–5.3)
PROT SERPL-MCNC: 6.8 G/DL (ref 6.4–8.3)
RBC URINE: 13 /HPF
SODIUM SERPL-SCNC: 141 MMOL/L (ref 135–145)
SP GR UR STRIP: 1.02 (ref 1–1.03)
UROBILINOGEN UR STRIP-MCNC: NORMAL MG/DL
WBC URINE: <1 /HPF

## 2025-04-24 PROCEDURE — 83605 ASSAY OF LACTIC ACID: CPT | Performed by: STUDENT IN AN ORGANIZED HEALTH CARE EDUCATION/TRAINING PROGRAM

## 2025-04-24 PROCEDURE — 82040 ASSAY OF SERUM ALBUMIN: CPT | Performed by: STUDENT IN AN ORGANIZED HEALTH CARE EDUCATION/TRAINING PROGRAM

## 2025-04-24 PROCEDURE — 258N000003 HC RX IP 258 OP 636: Performed by: STUDENT IN AN ORGANIZED HEALTH CARE EDUCATION/TRAINING PROGRAM

## 2025-04-24 PROCEDURE — 70551 MRI BRAIN STEM W/O DYE: CPT

## 2025-04-24 PROCEDURE — 250N000011 HC RX IP 250 OP 636: Mod: JZ | Performed by: STUDENT IN AN ORGANIZED HEALTH CARE EDUCATION/TRAINING PROGRAM

## 2025-04-24 PROCEDURE — 120N000001 HC R&B MED SURG/OB

## 2025-04-24 PROCEDURE — 250N000013 HC RX MED GY IP 250 OP 250 PS 637: Performed by: HOSPITALIST

## 2025-04-24 PROCEDURE — 81001 URINALYSIS AUTO W/SCOPE: CPT | Performed by: STUDENT IN AN ORGANIZED HEALTH CARE EDUCATION/TRAINING PROGRAM

## 2025-04-24 PROCEDURE — 99232 SBSQ HOSP IP/OBS MODERATE 35: CPT | Performed by: STUDENT IN AN ORGANIZED HEALTH CARE EDUCATION/TRAINING PROGRAM

## 2025-04-24 PROCEDURE — 36415 COLL VENOUS BLD VENIPUNCTURE: CPT | Performed by: STUDENT IN AN ORGANIZED HEALTH CARE EDUCATION/TRAINING PROGRAM

## 2025-04-24 RX ORDER — HYDRALAZINE HYDROCHLORIDE 20 MG/ML
10 INJECTION INTRAMUSCULAR; INTRAVENOUS EVERY 6 HOURS PRN
Status: DISCONTINUED | OUTPATIENT
Start: 2025-04-24 | End: 2025-05-13 | Stop reason: HOSPADM

## 2025-04-24 RX ORDER — DEXTROSE MONOHYDRATE AND SODIUM CHLORIDE 5; .9 G/100ML; G/100ML
INJECTION, SOLUTION INTRAVENOUS CONTINUOUS
Status: DISCONTINUED | OUTPATIENT
Start: 2025-04-24 | End: 2025-04-24

## 2025-04-24 RX ORDER — DEXTROSE MONOHYDRATE AND SODIUM CHLORIDE 5; .9 G/100ML; G/100ML
INJECTION, SOLUTION INTRAVENOUS CONTINUOUS
Status: ACTIVE | OUTPATIENT
Start: 2025-04-24 | End: 2025-04-25

## 2025-04-24 RX ORDER — CEFTRIAXONE 2 G/1
2 INJECTION, POWDER, FOR SOLUTION INTRAMUSCULAR; INTRAVENOUS EVERY 24 HOURS
Status: COMPLETED | OUTPATIENT
Start: 2025-04-24 | End: 2025-04-24

## 2025-04-24 RX ADMIN — DEXTROSE AND SODIUM CHLORIDE: 5; .9 INJECTION, SOLUTION INTRAVENOUS at 16:55

## 2025-04-24 RX ADMIN — HYDRALAZINE HYDROCHLORIDE 10 MG: 20 INJECTION INTRAMUSCULAR; INTRAVENOUS at 19:48

## 2025-04-24 RX ADMIN — ACETAMINOPHEN 650 MG: 650 SUPPOSITORY RECTAL at 20:55

## 2025-04-24 RX ADMIN — HYDRALAZINE HYDROCHLORIDE 10 MG: 20 INJECTION INTRAMUSCULAR; INTRAVENOUS at 13:51

## 2025-04-24 RX ADMIN — CEFTRIAXONE SODIUM 2 G: 2 INJECTION, POWDER, FOR SOLUTION INTRAMUSCULAR; INTRAVENOUS at 12:33

## 2025-04-24 RX ADMIN — ENOXAPARIN SODIUM 40 MG: 40 INJECTION SUBCUTANEOUS at 19:48

## 2025-04-24 RX ADMIN — ENOXAPARIN SODIUM 40 MG: 40 INJECTION SUBCUTANEOUS at 09:09

## 2025-04-24 ASSESSMENT — ACTIVITIES OF DAILY LIVING (ADL)
ADLS_ACUITY_SCORE: 81
ADLS_ACUITY_SCORE: 81
ADLS_ACUITY_SCORE: 75
ADLS_ACUITY_SCORE: 77
ADLS_ACUITY_SCORE: 81
ADLS_ACUITY_SCORE: 81
ADLS_ACUITY_SCORE: 77
ADLS_ACUITY_SCORE: 81
ADLS_ACUITY_SCORE: 75
ADLS_ACUITY_SCORE: 77
ADLS_ACUITY_SCORE: 81
ADLS_ACUITY_SCORE: 77
ADLS_ACUITY_SCORE: 75
ADLS_ACUITY_SCORE: 75
ADLS_ACUITY_SCORE: 77
ADLS_ACUITY_SCORE: 77
ADLS_ACUITY_SCORE: 81
ADLS_ACUITY_SCORE: 75
ADLS_ACUITY_SCORE: 77
ADLS_ACUITY_SCORE: 75
ADLS_ACUITY_SCORE: 77
ADLS_ACUITY_SCORE: 81
ADLS_ACUITY_SCORE: 81

## 2025-04-24 NOTE — PROGRESS NOTES
[facility]    Medicine Progress Note - Hospitalist Service    Date of Admission:  4/4/2025    Assessment & Plan   Estevan Aaron is a 64 year old male with PMH of HTN, HLD, hyperparathyroidism, dementia who was admitted on 4/4/25 for aggressive behaviors at his care facility.     Acute toxic metabolic encephalopathy, multifactorial, ongoing  Alzheimer's dementia w/ behavioral disturbance  Reportedly aggressive behaviors at memory care. Was calm and cooperative with staff in the ED, although significant behavioral disturbance noted early this admission.  History of prolonged inpatient psychiatric hospitalization 10/2024-late 2/2025. Psychiatry involved for assistance with medication titration, however, developed severe encephalopathy with persistent somnolence.  Mental status showed gradual improvement, although worsening somnolence noted on 4/24/2025 compared to prior days.  At this time, unclear if ongoing AMS with somnolence is in setting of slow drug metabolism versus hypoactive delirium with underlying dementia versus other toxic/metabolic/structural etiology.  No head imaging performed on admission and I feel this is now warranted given lack of improvement despite holding psychoactive medications.  - Continue 1:1  - Holding seroquel, depakote  - Continue PRN Seroquel   + Discussed with nursing, should only be used for severe agitation  - SLP following, cleared for regular diet although no PO intake 2/2 mental status  - Start D5-NS 100ml/hr  - MRI brain w/ and w/o    Acute hypoxic hypercarbic respiratory failure, resolved  Concern for aspiration PNA vs pneumonitis  Intermittent fever, unclear etiology  Sepsis w/ elevated lactate, resolved  Respiratory acidosis w/ metabolic compensation, resolved  The house team was called early morning on 4/20/25 due patient sleeping since 4/19/25 morning along with development of congested cough, hypoxia to 84%, and borderline fever. On exam, somnolence  noted without associated respiratory distress, other concerns. Briefly required BiPAP and quickly weaned to O2 NC. CXR without opacity, although some concern for acute aspiration event in setting of suspected medication-mediated somnolence as above.  Now weaned off of supplemental oxygen with intermittent low-grade fevers of unclear etiology.  UA noninfectious, no ongoing respiratory symptoms, no leukocytosis, and possibly driven by atelectasis in setting of somnolence.  - O2 goal >90%, currently on RA  - Completed course of antibiotics 4/24  - Continue holding furosemide  - Continue holding psychoactive medications    Hypernatremia, resolved  In setting of poor PO intake.  - Daily CMP    Elevated AST, stable  Unclear etiology, no history of liver disease or failure. Possibly in setting of statin use.  - Continue holding statin  - Daily CMP     Suspected ADAM - No formal sleep study, although high suspicion. Outpatient sleep center referral on discharge  HTN - Continue PTA amlodipine, clonidine, lisinopril, metoprolol. Hydralazine prn for SBP >180  HLD - Holding statin given liver dysfunction  Hyperparathyroidism - Continue cinacalcet  Hypernatremia, mild, resolved          Diet: Snacks/Supplements Adult: Ensure Enlive; With Meals  Room Service  Combination Diet Safe Tray - NO utensils; Soft and Bite Sized Diet (level 6); Thin Liquids (level 0)    DVT Prophylaxis: Enoxaparin (Lovenox) SQ  Alberto Catheter: Not present  Lines: None     Cardiac Monitoring: None  Code Status: No CPR- Do NOT Intubate      Clinically Significant Risk Factors          # Hyperchloremia: Highest Cl = 109 mmol/L in last 2 days, will monitor as appropriate       # Hypercalcemia: Highest Ca = 10.9 mg/dL in last 2 days, will monitor as appropriate    # Hypoalbuminemia: Lowest albumin = 3.3 g/dL at 4/23/2025  6:19 AM, will monitor as appropriate     # Hypertension: Noted on problem list     # Acute Hypercapnic Respiratory Failure: based on venous  blood gas results.  Continue supplemental oxygen and ventilatory support as indicated.    # Dementia: noted on problem list            # Financial/Environmental Concerns: none         Social Drivers of Health    Food Insecurity: Unknown (4/10/2025)    Food Insecurity     Within the past 12 months, did you worry that your food would run out before you got money to buy more?: Patient unable to answer     Within the past 12 months, did the food you bought just not last and you didn t have money to get more?: Patient unable to answer   Housing Stability: Unknown (4/10/2025)    Housing Stability     Do you have housing? : Patient unable to answer     Are you worried about losing your housing?: Patient unable to answer   Tobacco Use: High Risk (10/28/2024)    Patient History     Smoking Tobacco Use: Every Day     Smokeless Tobacco Use: Never   Financial Resource Strain: Unknown (4/10/2025)    Financial Resource Strain     Within the past 12 months, have you or your family members you live with been unable to get utilities (heat, electricity) when it was really needed?: Patient unable to answer   Transportation Needs: Unknown (4/10/2025)    Transportation Needs     Within the past 12 months, has lack of transportation kept you from medical appointments, getting your medicines, non-medical meetings or appointments, work, or from getting things that you need?: Patient unable to answer   Physical Activity: Insufficiently Active (2/20/2024)    Exercise Vital Sign     Days of Exercise per Week: 2 days     Minutes of Exercise per Session: 10 min   Interpersonal Safety: Unknown (4/10/2025)    Interpersonal Safety     Do you feel physically and emotionally safe where you currently live?: Patient unable to answer     Within the past 12 months, have you been hit, slapped, kicked or otherwise physically hurt by someone?: Patient unable to answer     Within the past 12 months, have you been humiliated or emotionally abused in other  ways by your partner or ex-partner?: Patient unable to answer   Stress: Stress Concern Present (2/20/2024)    [employer] - Occupational Stress Questionnaire     Feeling of Stress : To some extent   Social Connections: Unknown (2/20/2024)    Social Connection and Isolation Panel [NHANES]     Frequency of Social Gatherings with Friends and Family: Once a week          Disposition Plan     Medically Ready for Discharge: Anticipated Tomorrow     Troy Farley MD  Hospitalist Service  [facility]  Securely message with [employer] (more info)  Text page via [employer] Paging/Directory   ______________________________________________________________________    Interval History   Worsening somnolence noted throughout the day today.  Continues to have garbled speech and minimal meaningful interaction.  Unable to reach patient's guardians today.    Physical Exam   Vital Signs: Temp: 100.9  F (38.3  C) Temp src: Axillary BP: (!) 149/88 Pulse: 88   Resp: 18 SpO2: (!) 91 % O2 Device: None (Room air) Oxygen Delivery: 1/2 LPM  Weight: 0 lbs 0 oz    General: Somnolent, arouses to touch, NAD, laying flat in bed  HEENT: Atraumatic, normocephalic, EOMI, no scleral icterus  CV: RRR, no murmurs, no MARIA ANTONIA, distal pulses intact  Pulm: Coarse breath sounds, breathing comfortably on RA, no wheezes, no crackles  Abd: Soft, non-tender, non-distended  Skin: No rashes, lesions, or wounds visualized  Neuro: AOx0, withdraws to pain, moving all extremities spontaneously, speech garbled and non-sensical, arouses to touch    Medical Decision Making       45 MINUTES SPENT BY ME on the date of service doing chart review, history, exam, documentation & further activities per the note.      Data   ------------------------- PAST 24 HR DATA REVIEWED -----------------------------------------------

## 2025-04-24 NOTE — PLAN OF CARE
PRIMARY Concern: Originally presented with aggressive behavior from his [facility],Hx of dementia w/ aggression.  Now pt somnolent w/fever and concern of aspiration pneumonia.    SAFETY RISK Concerns (fall risk, behaviors, etc.): Aggression & fall risk      Aggression Tool Color: yellow  Isolation/Type: n/a  Tests/Procedures for NEXT shift:   Consults? (Pending/following, signed-off?) Psych ,SW/SLP following   Where is patient from? (Home, TCU, etc.): [facility].  Other Important info for NEXT shift:    Anticipated DC date & active delays: TBD- sw Following.      SUMMARY NOTE:  Orientation/Cognitive: Confused, unable to assess orientation.   Observation Goals (Met/ Not Met): Inpatient  Mobility Level/Assist Equipment: A2 Kyung steady/ Lift. At one point today, Pt was standing in front on recliner and refusing to sit.  Antibiotics & Plan (IV/po, length of tx left): IV Rocephin   Pain Management: No s/sx of pain noted or reported.   Tele/VS/O2: VSS on 2L NC unable to wean at this point.   ABNL Lab/BG: WBC now: 9.4. NA: 149.  Diet: Start on Soft and Bite Sized Diet (level 6) w/ thin liquid on 4/21 by SLP. Assist w/ feeding. Poor appetite.   Bowel/Bladder: Incontinent of urine. LBM: 4/23  Skin Concerns: Scab to lower extremity, abrasion/excoriation  and redness back and rt shin. Mepilex in place on back & Sacrum/Coccyx   Drains/Devices:PIV: Infusing D5 @125/hr  Patient Stated Goal for Today:NARESH.

## 2025-04-24 NOTE — PLAN OF CARE
4/24  0700 - 1530    Orientation: Somnolent, lethargic. NARESH orientation and mood  Aggression Stop Light: Green  Activity: A2-3 lift  Diet/BS Checks: Soft/bite size, thin liquid - not able to attempt PO intake today  Tele: n/a  IV Access/Drains: PIV SL x2  Pain Management: No overt signs of pain  Abnormal VS/Results: HTN, IV hydralazine x1 effective. Tmax 101.5, MD notified. O2 stable on room air.  Bowel/Bladder: Incontinent, no BM this shift  Skin/Wounds: Scattered bruising  Consults: slp  D/C Disposition: Pending clinical progress and placement  Other Info:    - Switched back to IV ceftriaxone since not able to swallow PO augmentin   - urine sample collected, results in chart   - plan for brain MRI to be completed this evening.

## 2025-04-24 NOTE — PLAN OF CARE
PRIMARY Concern: Originally presented with aggressive behavior from his [facility],Hx of dementia w/ aggression.  Now pt somnolent w/fever and concern of aspiration pneumonia.    SAFETY RISK Concerns (fall risk, behaviors, etc.): Aggression & fall risk        Aggression Tool Color: yellow  Isolation/Type: n/a  Tests/Procedures for NEXT shift:   Consults? (Pending/following, signed-off?) Psych ,SW/SLP following   Where is patient from? (Home, TCU, etc.): [facility].  Other Important info for NEXT shift:     Anticipated DC date & active delays: TBD- sw Following.      SUMMARY NOTE:  Orientation/Cognitive: Confused, unable to assess orientation.   Observation Goals (Met/ Not Met): Inpatient  Mobility Level/Assist Equipment: A2 Kyung steady/ Lift.   Antibiotics & Plan (IV/po, length of tx left): IV Rocephin   Pain Management: No s/sx of pain noted or reported.   Tele/VS/O2: VSS on 2L NC unable to wean at this point.   ABNL Lab/BG: See Chart  Diet: Start on Soft and Bite Sized Diet (level 6) w/ thin liquid on 4/21 by SLP. Assist w/ feeding. Poor appetite.   Bowel/Bladder: Incontinent of urine. LBM: 4/23  Skin Concerns: Scab to lower extremity, abrasion/excoriation  and redness back and rt shin. Mepilex in place on back & Sacrum/Coccyx   Drains/Devices:PIV/SL  Patient Stated Goal for Today:NARESH.

## 2025-04-25 ENCOUNTER — APPOINTMENT (OUTPATIENT)
Dept: GENERAL RADIOLOGY | Facility: CLINIC | Age: 65
DRG: 056 | End: 2025-04-25
Attending: STUDENT IN AN ORGANIZED HEALTH CARE EDUCATION/TRAINING PROGRAM
Payer: MEDICARE

## 2025-04-25 VITALS
HEART RATE: 98 BPM | OXYGEN SATURATION: 96 % | DIASTOLIC BLOOD PRESSURE: 110 MMHG | RESPIRATION RATE: 20 BRPM | SYSTOLIC BLOOD PRESSURE: 169 MMHG | TEMPERATURE: 99.2 F

## 2025-04-25 LAB
ANION GAP SERPL CALCULATED.3IONS-SCNC: 10 MMOL/L (ref 7–15)
BACTERIA BLD CULT: NO GROWTH
BACTERIA BLD CULT: NO GROWTH
BUN SERPL-MCNC: 13.6 MG/DL (ref 8–23)
CALCIUM SERPL-MCNC: 10.9 MG/DL (ref 8.8–10.4)
CHLORIDE SERPL-SCNC: 108 MMOL/L (ref 98–107)
CREAT SERPL-MCNC: 0.71 MG/DL (ref 0.67–1.17)
EGFRCR SERPLBLD CKD-EPI 2021: >90 ML/MIN/1.73M2
ERYTHROCYTE [DISTWIDTH] IN BLOOD BY AUTOMATED COUNT: 17.2 % (ref 10–15)
GLUCOSE SERPL-MCNC: 135 MG/DL (ref 70–99)
HCO3 SERPL-SCNC: 22 MMOL/L (ref 22–29)
HCT VFR BLD AUTO: 39.3 % (ref 40–53)
HGB BLD-MCNC: 12.4 G/DL (ref 13.3–17.7)
MCH RBC QN AUTO: 19.2 PG (ref 26.5–33)
MCHC RBC AUTO-ENTMCNC: 31.6 G/DL (ref 31.5–36.5)
MCV RBC AUTO: 61 FL (ref 78–100)
PLATELET # BLD AUTO: 237 10E3/UL (ref 150–450)
POTASSIUM SERPL-SCNC: 3.8 MMOL/L (ref 3.4–5.3)
RBC # BLD AUTO: 6.45 10E6/UL (ref 4.4–5.9)
SODIUM SERPL-SCNC: 140 MMOL/L (ref 135–145)
VALPROATE SERPL-MCNC: 36.9 UG/ML
WBC # BLD AUTO: 13.1 10E3/UL (ref 4–11)

## 2025-04-25 PROCEDURE — 82310 ASSAY OF CALCIUM: CPT | Performed by: STUDENT IN AN ORGANIZED HEALTH CARE EDUCATION/TRAINING PROGRAM

## 2025-04-25 PROCEDURE — 250N000011 HC RX IP 250 OP 636: Performed by: STUDENT IN AN ORGANIZED HEALTH CARE EDUCATION/TRAINING PROGRAM

## 2025-04-25 PROCEDURE — 71045 X-RAY EXAM CHEST 1 VIEW: CPT

## 2025-04-25 PROCEDURE — 258N000003 HC RX IP 258 OP 636: Performed by: STUDENT IN AN ORGANIZED HEALTH CARE EDUCATION/TRAINING PROGRAM

## 2025-04-25 PROCEDURE — 36415 COLL VENOUS BLD VENIPUNCTURE: CPT | Performed by: STUDENT IN AN ORGANIZED HEALTH CARE EDUCATION/TRAINING PROGRAM

## 2025-04-25 PROCEDURE — G0463 HOSPITAL OUTPT CLINIC VISIT: HCPCS

## 2025-04-25 PROCEDURE — 85014 HEMATOCRIT: CPT | Performed by: STUDENT IN AN ORGANIZED HEALTH CARE EDUCATION/TRAINING PROGRAM

## 2025-04-25 PROCEDURE — 87040 BLOOD CULTURE FOR BACTERIA: CPT | Performed by: STUDENT IN AN ORGANIZED HEALTH CARE EDUCATION/TRAINING PROGRAM

## 2025-04-25 PROCEDURE — 99232 SBSQ HOSP IP/OBS MODERATE 35: CPT | Performed by: PSYCHIATRY & NEUROLOGY

## 2025-04-25 PROCEDURE — 80164 ASSAY DIPROPYLACETIC ACD TOT: CPT | Performed by: STUDENT IN AN ORGANIZED HEALTH CARE EDUCATION/TRAINING PROGRAM

## 2025-04-25 PROCEDURE — 120N000001 HC R&B MED SURG/OB

## 2025-04-25 PROCEDURE — 250N000013 HC RX MED GY IP 250 OP 250 PS 637: Performed by: HOSPITALIST

## 2025-04-25 PROCEDURE — 99232 SBSQ HOSP IP/OBS MODERATE 35: CPT | Performed by: STUDENT IN AN ORGANIZED HEALTH CARE EDUCATION/TRAINING PROGRAM

## 2025-04-25 RX ORDER — PIPERACILLIN SODIUM, TAZOBACTAM SODIUM 4; .5 G/20ML; G/20ML
4.5 INJECTION, POWDER, LYOPHILIZED, FOR SOLUTION INTRAVENOUS EVERY 6 HOURS
Status: DISCONTINUED | OUTPATIENT
Start: 2025-04-25 | End: 2025-04-29

## 2025-04-25 RX ORDER — DEXTROSE MONOHYDRATE AND SODIUM CHLORIDE 5; .9 G/100ML; G/100ML
INJECTION, SOLUTION INTRAVENOUS CONTINUOUS
Status: ACTIVE | OUTPATIENT
Start: 2025-04-25 | End: 2025-04-26

## 2025-04-25 RX ORDER — NYSTATIN 100000 [USP'U]/ML
500000 SUSPENSION ORAL 4 TIMES DAILY PRN
Status: DISCONTINUED | OUTPATIENT
Start: 2025-04-25 | End: 2025-05-04

## 2025-04-25 RX ADMIN — ENOXAPARIN SODIUM 40 MG: 40 INJECTION SUBCUTANEOUS at 09:58

## 2025-04-25 RX ADMIN — ACETAMINOPHEN 650 MG: 650 SUPPOSITORY RECTAL at 21:43

## 2025-04-25 RX ADMIN — ENOXAPARIN SODIUM 40 MG: 40 INJECTION SUBCUTANEOUS at 20:10

## 2025-04-25 RX ADMIN — ACETAMINOPHEN 650 MG: 650 SUPPOSITORY RECTAL at 16:19

## 2025-04-25 RX ADMIN — DEXTROSE AND SODIUM CHLORIDE: 5; .9 INJECTION, SOLUTION INTRAVENOUS at 04:59

## 2025-04-25 RX ADMIN — DEXTROSE AND SODIUM CHLORIDE: 5; .9 INJECTION, SOLUTION INTRAVENOUS at 14:35

## 2025-04-25 RX ADMIN — PIPERACILLIN AND TAZOBACTAM 4.5 G: 4; .5 INJECTION, POWDER, FOR SOLUTION INTRAVENOUS at 23:11

## 2025-04-25 ASSESSMENT — ACTIVITIES OF DAILY LIVING (ADL)
ADLS_ACUITY_SCORE: 79
ADLS_ACUITY_SCORE: 83
ADLS_ACUITY_SCORE: 79
ADLS_ACUITY_SCORE: 83
ADLS_ACUITY_SCORE: 79
ADLS_ACUITY_SCORE: 79
ADLS_ACUITY_SCORE: 81
ADLS_ACUITY_SCORE: 76
ADLS_ACUITY_SCORE: 79
ADLS_ACUITY_SCORE: 83
ADLS_ACUITY_SCORE: 79
ADLS_ACUITY_SCORE: 79
ADLS_ACUITY_SCORE: 76
ADLS_ACUITY_SCORE: 76
ADLS_ACUITY_SCORE: 81
ADLS_ACUITY_SCORE: 79
ADLS_ACUITY_SCORE: 76
ADLS_ACUITY_SCORE: 81
ADLS_ACUITY_SCORE: 79
ADLS_ACUITY_SCORE: 76
ADLS_ACUITY_SCORE: 83

## 2025-04-25 NOTE — PLAN OF CARE
Goal Outcome Evaluation:    4/24/25 0801-2227    Orientation: Somnolent, lethargic. NARESH orientation and mood  Aggression Stop Light: Green  Activity: A2-3 lift, turned and repositioned  Diet/BS Checks: Soft/bite size, thin liquid - not able to attempt PO intake the entire shift  Tele: N/A  IV Access/Drains: PIV SL R arm.  D5NS @ 100 ml/hr infusing on the L arm  Pain Management: No overt signs of pain  Abnormal VS/Results: Elevated BP  Bowel/Bladder: Incontinent, no BM this shift  Skin/Wounds: Scattered bruises and scab.  Consults:   D/C Disposition: Pending clinical progress and placement  Other Info: MR brain without contrast results: motion degraded exam, No definite acute findings, Age-related changes. Latest temp of 99. Foam dressing on the coccyx CDI. Attached to O2 @ 2 LPM via NC @ the start of the shift, able to wean on RA.

## 2025-04-25 NOTE — CONSULTS
"[facility]  WO Nurse Inpatient Assessment     Consulted for: Pressure Injury coccyx     Summary: patient with hospital acquired deep tissue pressure injury to sacrococcygeal area, initial wo assessment 4/25    WO nurse follow-up plan: twice weekly    Patient History (according to provider note(s):      \"64 year old male with PMH of HTN, HLD, hyperparathyroidism, dementia who was admitted on 4/4/25 for aggressive behaviors at his care facility. \"    Assessment:      Areas visualized during today's visit: Focused: and Sacrum/coccyx    Pressure Injury Location: buttock, sacrococcygeal     Last photo: 4/25  Wound type: Pressure Injury     Pressure Injury Stage: Deep Tissue Pressure Injury (DTPI), hospital acquired        Wound history/plan of care:   found with sacral mepilex and diaper and external catheter in use with chucks pad     Wound base:  Non-blanchable, Erythema, Maroon, Purple, and Epidermis,      Palpation of the wound bed:  mild firm        Drainage: none     Description of drainage: none     Measurements (length x width x depth, in cm) 9  x 15  x  0 cm      Tunneling N/A     Undermining N/A  Periwound skin: Intact      Color: normal and consistent with surrounding tissue      Temperature: normal   Odor: none  Pain: no grimacing or signs of discomfort, none  Pain intervention prior to dressing change: patient tolerated well and no significant pain present   Treatment goal: Heal , Maintain (prevention of deterioration), and Protection  STATUS: initial assessment  Supplies ordered: supplies stored on unit and discussed with RN    My PI Risk Assessment     Sensory Perception: 3 - Slightly Limited     Moisture: 1 - Constantly moist     Activity: 2 - Chairfast     Mobility: 3 - Slightly limited      Nutrition: 2 - Probably inadequate      Friction/Shear: 2 - Potential problem      TOTAL: 14       Treatment Plan:     04/26/25 0600  Wound care  DAILY        Comments: Location: buttock, " "sacrococcygeal  Care: provided daily by primary RN  1. Cleanse daily with normal saline and 4x4\" gauze, pat dry  2. Cover with silicone foam sacral mepilex dressing, ok to lift for routine assessment and reapply        04/25/25 0563  Skin care precautions  EFFECTIVE NOW        Comments: Pressure Injury Prevention (PIP) Plan:  If patient is declining pressure injury prevention interventions: Explore reason why and address patient's concerns, Educate on pressure injury risk and prevention intervention(s), If patient is still declining, document \"informed refusal\" , and Ensure Care team is aware ( provider, charge nurse, etc)  Mattress: Follow bed algorithm, add Low Air Loss (Air+) mattress pump if skin is very moist or constantly moist.  HOB: Maintain at or below 30 degrees, unless contraindicated  Repositioning in bed: Every 1-2 hours , Left/right positioning; avoid supine, and Raise foot of bed prior to raising head of bed, to reduce patient sliding down (shear)  Heels: Keep elevated off mattress and Pillows under calves  Protective Dressing: Sacral Mepilex for prevention (#[id number]),  especially for the agitated patient  Positioning Equipment:Positioning wedges (#118255) to help maintain 30 degree side lying position  Chair positioning: Chair cushion (#[id number]) , Assist patient to reposition hourly, and Do NOT use a donut for sitting (this increases pressure to smaller area and creates a higher potential for injury)    If patient has a buttock pressure injury, or high risk for PI use chair cushion or SPS.  Moisture Management: Perineal cleansing /protection: Follow Incontinence Protocol, Avoid brief in bed, Clean and dry skin folds with bathing , and Moisturize dry skin  Under Devices: Inspect skin under all medical devices during skin inspection , Ensure tubes are stabilized without tension, and Ensure patient is not lying on medical devices or equipment when repositioned  Ask provider to discontinue device when no " longer needed.          Orders: Written    RECOMMEND PRIMARY TEAM ORDER: None, at this time  Education provided: plan of care  Discussed plan of care with: Patient and Nurse  Notify [facility] if wound(s) deteriorate.  Nursing to notify the Provider(s) and re-consult the [facility] Nurse if new skin concern.    DATA:     Current support surface: Standard  Standard gel mattress (Isoflex)  Containment of urine/stool: Incontinence Protocol, Incontinent pad in bed, and Suction based external urinary catheter   BMI: There is no height or weight on file to calculate BMI.   Active diet order: Orders Placed This Encounter      Combination Diet Safe Tray - NO utensils; Soft and Bite Sized Diet (level 6); Thin Liquids (level 0)     Output: No intake/output data recorded.     Labs:   Recent Labs   Lab 04/25/25  0618 04/24/25  0622   ALBUMIN  --  3.4*   HGB 12.4*  --    WBC 13.1*  --      Pressure injury risk assessment:   Sensory Perception: 2-->very limited  Moisture: 1-->constantly moist  Activity: 1-->bedfast  Mobility: 2-->very limited  Nutrition: 2-->probably inadequate  Friction and Shear: 1-->problem  Rainer Score: 9    Kristie CWOCN   1st choice: Securely message with [employer] ([facility] [employer])   (2nd option: [facility] Office Phone [phone], messages checked periodically Mon-Fri 8a-4p)

## 2025-04-25 NOTE — PLAN OF CARE
Goal Outcome Evaluation:      Summary:  Originally presented with aggressive behavior from his [facility],Hx of dementia w/ aggression.  Now pt somnolent w/fever and concern of aspiration pneumonia.      Date & Time: 4/24/25 3459-0140   Orientation:  Somnolent, lethargic. NARESH Activity Level: A2 lift,   Fall Risk: Yes   Behavior & Aggression: Green    Pain Management:  No sign of pain  ABNL VS/O2: VSS on RA, ex HTN, Hydralazine for SBP> 180   Tele: N/A   ABNL Lab/BG: N/A   Diet: Soft/bite size, thin liquid - but notable to take anything  Bowel/Bladder: Incontinent    Skin: Scab to lower extremity, abrasion/excoriation  and redness back and rt shin. Mepilex on Sacrum/Coccyx   Drains/Devices:PIV/SL  Drains/Devices: 2 PIV, L infusing D5NS at 100 ml/hr   Tests/Procedures: MR brain done tonight  Anticipated DC Date: Pending   Other Important Info: Pt febrile temp 101.7, 101.4 , tylenol suppository given x1. Temp went down to 100.9. Provider updated.

## 2025-04-25 NOTE — PROGRESS NOTES
[facility]    Medicine Progress Note - Hospitalist Service    Date of Admission:  4/4/2025    Assessment & Plan   Estevan Aaron is a 64 year old male with PMH of HTN, HLD, hyperparathyroidism, dementia who was admitted on 4/4/25 for aggressive behaviors at his care facility.     Acute toxic metabolic encephalopathy, multifactorial, ongoing  Alzheimer's dementia w/ behavioral disturbance  Reportedly aggressive behaviors at memory care. Was calm and cooperative with staff in the ED, although significant behavioral disturbance noted early this admission.  History of prolonged inpatient psychiatric hospitalization 10/2024-late 2/2025. Psychiatry involved for assistance with medication titration, however, developed severe encephalopathy with persistent somnolence.  Mental status showed gradual improvement, although worsening somnolence noted on 4/24/2025 compared to prior days. MRI brain 4/24 without acute changes. At this time, unclear if ongoing AMS with somnolence is in setting of slow drug metabolism versus hypoactive delirium with underlying dementia versus other toxic/metabolic etiology.   - Holding seroquel, depakote  - Continue PRN Seroquel   + Discussed with nursing, should only be used for severe agitation  - SLP following, cleared for regular diet although no PO intake 2/2 mental status  - Continue D5-NS 100ml/hr   + Pending improved mental status/PO intake, will need to consider feeding tube in coming days  - Delirium precautions  - Re-consult psychiatry   + Pending their assessment, will consider Neurology consult    Acute hypoxic hypercarbic respiratory failure, resolved  Concern for aspiration PNA vs pneumonitis  Intermittent fever, unclear etiology  Sepsis w/ elevated lactate, resolved  Respiratory acidosis w/ metabolic compensation, resolved  The house team was called early morning on 4/20/25 due patient sleeping since 4/19/25 morning along with development of congested cough,  hypoxia to 84%, and borderline fever. On exam, somnolence noted without associated respiratory distress, other concerns. Briefly required BiPAP and quickly weaned to O2 NC. CXR without opacity, although some concern for acute aspiration event in setting of suspected medication-mediated somnolence as above.  Now weaned off of supplemental oxygen with intermittent low-grade fevers of unclear etiology.  UA noninfectious, no ongoing respiratory symptoms, no leukocytosis, and possibly driven by atelectasis in setting of somnolence.  - O2 goal >90%, currently on RA  - Completed course of antibiotics 4/24  - Continue holding furosemide  - Continue holding psychoactive medications    Hypernatremia, resolved  In setting of poor PO intake.  - Daily CMP    Elevated AST, stable  Unclear etiology, no history of liver disease or failure. Possibly in setting of statin use.  - Continue holding statin  - Daily CMP     Suspected ADAM - No formal sleep study, although high suspicion. Outpatient sleep center referral on discharge  HTN - Continue PTA amlodipine, clonidine, lisinopril, metoprolol. Hydralazine prn for SBP >180  HLD - Holding statin given liver dysfunction  Hyperparathyroidism - Continue cinacalcet  Hypernatremia, mild, resolved          Diet: Snacks/Supplements Adult: Ensure Enlive; With Meals  Room Service  Combination Diet Safe Tray - NO utensils; Soft and Bite Sized Diet (level 6); Thin Liquids (level 0)    DVT Prophylaxis: Enoxaparin (Lovenox) SQ  Alberto Catheter: Not present  Lines: None     Cardiac Monitoring: None  Code Status: No CPR- Do NOT Intubate      Clinically Significant Risk Factors          # Hyperchloremia: Highest Cl = 108 mmol/L in last 2 days, will monitor as appropriate       # Hypercalcemia: Highest Ca = 10.9 mg/dL in last 2 days, will monitor as appropriate    # Hypoalbuminemia: Lowest albumin = 3.3 g/dL at 4/23/2025  6:19 AM, will monitor as appropriate     # Hypertension: Noted on problem list      # Acute Hypercapnic Respiratory Failure: based on venous blood gas results.  Continue supplemental oxygen and ventilatory support as indicated.  # Dementia: noted on problem list            # Financial/Environmental Concerns: none         Social Drivers of Health    Food Insecurity: Unknown (4/10/2025)    Food Insecurity     Within the past 12 months, did you worry that your food would run out before you got money to buy more?: Patient unable to answer     Within the past 12 months, did the food you bought just not last and you didn t have money to get more?: Patient unable to answer   Housing Stability: Unknown (4/10/2025)    Housing Stability     Do you have housing? : Patient unable to answer     Are you worried about losing your housing?: Patient unable to answer   Tobacco Use: High Risk (10/28/2024)    Patient History     Smoking Tobacco Use: Every Day     Smokeless Tobacco Use: Never   Financial Resource Strain: Unknown (4/10/2025)    Financial Resource Strain     Within the past 12 months, have you or your family members you live with been unable to get utilities (heat, electricity) when it was really needed?: Patient unable to answer   Transportation Needs: Unknown (4/10/2025)    Transportation Needs     Within the past 12 months, has lack of transportation kept you from medical appointments, getting your medicines, non-medical meetings or appointments, work, or from getting things that you need?: Patient unable to answer   Physical Activity: Insufficiently Active (2/20/2024)    Exercise Vital Sign     Days of Exercise per Week: 2 days     Minutes of Exercise per Session: 10 min   Interpersonal Safety: Unknown (4/10/2025)    Interpersonal Safety     Do you feel physically and emotionally safe where you currently live?: Patient unable to answer     Within the past 12 months, have you been hit, slapped, kicked or otherwise physically hurt by someone?: Patient unable to answer     Within the past 12 months,  have you been humiliated or emotionally abused in other ways by your partner or ex-partner?: Patient unable to answer   Stress: Stress Concern Present (2/20/2024)    [employer] - Occupational Stress Questionnaire     Feeling of Stress : To some extent   Social Connections: Unknown (2/20/2024)    Social Connection and Isolation Panel [NHANES]     Frequency of Social Gatherings with Friends and Family: Once a week          Disposition Plan     Medically Ready for Discharge: Anticipated in 2-4 Days     Troy Farley MD  Hospitalist Service  [facility]  Securely message with [employer] (more info)  Text page via [facility] Paging/Directory   ______________________________________________________________________    Interval History   Ongoing somnolence, marginally improved from yesterday. Called patient's ex-wife/guardian and provided update.     Physical Exam   Vital Signs: Temp: 100.2  F (37.9  C) Temp src: Axillary BP: (!) 172/94 Pulse: 89   Resp: 18 SpO2: 94 % O2 Device: None (Room air) Oxygen Delivery: 1/2 LPM  Weight: 0 lbs 0 oz    General: Somnolent, arouses to touch, NAD, laying flat in bed  HEENT: Atraumatic, normocephalic, EOMI, no scleral icterus  CV: RRR, no murmurs, no MARIA ANTONIA, distal pulses intact  Pulm: Coarse breath sounds, breathing comfortably on RA, no wheezes, no crackles  Abd: Soft, non-tender, non-distended  Skin: No rashes, lesions, or wounds visualized  Neuro: AOx0, withdraws to pain, moving all extremities spontaneously, speech garbled and non-sensical, arouses to touch    Medical Decision Making       45 MINUTES SPENT BY ME on the date of service doing chart review, history, exam, documentation & further activities per the note.      Data   ------------------------- PAST 24 HR DATA REVIEWED -----------------------------------------------

## 2025-04-25 NOTE — PLAN OF CARE
PRIMARY Concern: Originally presented with aggressive behavior from his [facility],Hx of dementia w/ aggression.  Now pt somnolent w/fever and concern of aspiration pneumonia.    SAFETY RISK Concerns (fall risk, behaviors, etc.): Aggression & fall risk        Aggression Tool Color: GREEN  Isolation/Type: n/a  Tests/Procedures for NEXT shift:   Consults? (Pending/following, signed-off?) Psych saw patient today, recommended neurology consult.  SW/SLP following WOC consulted for new pressure injury. Neurology consult added today.   Where is patient from? (Home, TCU, etc.): [facility].  Other Important info for NEXT shift:     Anticipated DC date & active delays: TBD- sw Following.      SUMMARY NOTE:  Orientation/Cognitive: Confused, unable to assess orientation.   Observation Goals (Met/ Not Met): Inpatient  Mobility Level/Assist Equipment: A2 Kyung steady/ Lift.   Antibiotics & Plan (IV/po, length of tx left): None   Pain Management: No s/sx of pain noted or reported.   Tele/VS/O2: HTN, RA, Temp 100.0, continuing to monitor.  ABNL Lab/BG: WBC 13.1, hemoglobin 12.4  Diet: Start on Soft and Bite Sized Diet (level 6) w/ thin liquid on 4/21 by SLP. Assist w/ feeding. Poor appetite.   Bowel/Bladder: Incontinent of urine, external male catheter in place. LBM 4/23  Skin Concerns: Scab to lower extremity, abrasion/excoriation  and redness back and rt shin. Pressure injury to sacrum and coccyx; abrasion on right upper back  Drains/Devices:PIV/SL  Patient Stated Goal for Today:NARESH.

## 2025-04-25 NOTE — PLAN OF CARE
Date/Time: 04/25: 7010-2681  Trauma/Ortho/Medical (Choose one): Medical  Diagnosis: Aggressive behaviors  POD#: N/A  Mental Status: Lethargic, somnolent, wakes up on and off  Activity/dangle: Up with 2/Lift  Diet: Soft/Bite size in soft tray, thin liquid  Pain: Doesn't appear in pain  Alberto/Voiding: Incontinent of bladder. Has external catheter  Tele/Restraints/Iso: None  02/LDA: RA. Is on cont pulse oxymetry sating low 90's. D5NS infusing at 100 cc  D/C Date: TBD  Other Info: Transferred from obs unit at around 1725. Assist of 2-3 wit turning. Mepilex on coccyx and (R) side of back. Pt is febrile, was given tylenol rectally. BC has been collected and port chest x-ray is pending

## 2025-04-25 NOTE — CONSULTS
Psychiatry Consultation; Follow up              Reason for Consult, requesting source:      Requesting source: Oleksandr Kong    Labs and imaging reviewed,     Total time spent in chart review, patient interview and coordination of care;            Interim history:    Patient now appearing sedate.  I have reviewed the psychiatric consults done by Eduardo Reza on 4/7/2025/4/9/2025 4/17/2025//and 4/21/2025  Seroquel was discontinued on 4/20/2025.  Patient's Depakote was held yesterday  . On approach this AM patient remains somnolent. Does not respond to greeting.  Patient has not eaten any food.  Staff notes that he is talking to himself intermittently.  He is very lethargic.          Current Medications:     Current Facility-Administered Medications   Medication Dose Route Frequency Provider Last Rate Last Admin    amLODIPine (NORVASC) tablet 10 mg  10 mg Oral Daily Troy Farley MD   10 mg at 04/23/25 1124    [Held by provider] atorvastatin (LIPITOR) tablet 40 mg  40 mg Oral Daily Armando Kern MD   40 mg at 04/20/25 2131    cinacalcet (SENSIPAR) tablet 30 mg  30 mg Oral Daily Evelin Nava MD   30 mg at 04/22/25 1134    cloNIDine (CATAPRES) tablet 0.1 mg  0.1 mg Oral BID Armando Kern MD   0.1 mg at 04/23/25 2121    [Held by provider] divalproex sodium extended-release (DEPAKOTE ER) 24 hr tablet 1,000 mg  1,000 mg Oral Q12H [facility] (08/20) Evelin Nava MD   1,000 mg at 04/23/25 2121    Or    [Held by provider] divalproex sodium delayed-release (DEPAKOTE SPRINKLE) DR capsule 1,000 mg  1,000 mg Oral Q12H [facility] (08/20) Evelin Nava MD   1,000 mg at 04/22/25 1139    enoxaparin ANTICOAGULANT (LOVENOX) injection 40 mg  40 mg Subcutaneous Q12H Troy Farley MD   40 mg at 04/25/25 0958    [Held by provider] furosemide (LASIX) tablet 40 mg  40 mg Oral Daily Armando Kern MD   40 mg at 04/19/25 0902    lisinopril (ZESTRIL) tablet 40 mg  40 mg Oral Daily Troy Farley MD    "40 mg at 04/23/25 1124    melatonin tablet 3 mg  3 mg Oral At Bedtime Oleksandr Kong MD   3 mg at 04/23/25 2121    metoprolol succinate ER (TOPROL XL) 24 hr tablet 50 mg  50 mg Oral Daily Armando Kern MD   50 mg at 04/22/25 1133    [Held by provider] QUEtiapine ER (SEROquel XR) 24 hr tablet 300 mg  300 mg Oral At Bedtime Troy Farley MD   300 mg at 04/21/25 2121    sennosides (SENOKOT) tablet 1 tablet  1 tablet Oral Daily Armando Kern MD   1 tablet at 04/21/25 0936              MSE:   Appearance: Sleeping  Attitude:   not able to assess  Eye Contact:  poor   Mood: Not able to assess  Affect: Not able to assess  Speech: Not able to assess  Psychomotor Behavior: Not able to assess  Muscle strength and tone:  not able to assess not able to assess  Thought Process: Not able to assess  Associations: Not able to assess  Thought Content: Not able to assess  Insight: Not able to assess  Judgement: Not able to assess  Oriented to: Not oriented to place person and situation patient is very sedated  Attention Span and Concentration: Not able to assess  Recent and Remote Memory: Not able to assess    Vital signs:  Temp: 100  F (37.8  C) Temp src: Axillary BP: (!) 176/90 Pulse: 93   Resp: 18 SpO2: 93 % O2 Device: None (Room air) Oxygen Delivery: 1/2 LPM      Estimated body mass index is 40.9 kg/m  as calculated from the following:    Height as of 10/10/24: 1.676 m (5' 6\").    Weight as of 2/23/25: 114.9 kg (253 lb 6.4 oz).    Qtc:          DSM-5 Diagnosis:   Major neurocognitive disorder          Assessment:   Patient more sedated we will hold his Depakote his Seroquel was also held.  Patient has a diagnosis of major neurocognitive disorder and he was very agitated Seroquel and Depakote was added.  The patient was very sedated Seroquel was discontinued on 4/20 but patient continues to be very sedated and yesterday patient's Depakote was held.  Anticipate once We discontinue the Depakote patient will become " "more alert.          Summary of Recommendations:   Continue to hold seroquel for now,   Continue to hold depakote 1000mg BID  Continue to hold as needed Seroquel.  Patient is very sedated would benefit from a neurology consult.  Patient has a diagnosis of major neurocognitive disorder with agitation.  Initiate DVT and gatric ulcer ppx  Daily skin evaluation, q2 hr reposition if bed bound, through skin hygiene  PT consultation, passive and active ROM excercises, daily stretching  Nutitional consultation ,   Moniter I/O  IVF as necessary to maintain hydration  Please reconsult psychiatry      \"Much or all of the text in this note was generated through the use of Dragon Dictate voice to text software. Errors in spelling or words which appear to be out of contact are unintentional, may be present due having escaped editing\"           "

## 2025-04-26 ENCOUNTER — HOSPITAL ENCOUNTER (INPATIENT)
Dept: NEUROLOGY | Facility: CLINIC | Age: 65
Discharge: HOME | End: 2025-04-26
Attending: PSYCHIATRY & NEUROLOGY
Payer: MEDICARE

## 2025-04-26 LAB
ALBUMIN SERPL BCG-MCNC: 3 G/DL (ref 3.5–5.2)
ALP SERPL-CCNC: 98 U/L (ref 40–150)
ALT SERPL W P-5'-P-CCNC: 74 U/L (ref 0–70)
ANION GAP SERPL CALCULATED.3IONS-SCNC: 12 MMOL/L (ref 7–15)
AST SERPL W P-5'-P-CCNC: 85 U/L (ref 0–45)
BILIRUB DIRECT SERPL-MCNC: 0.31 MG/DL (ref 0–0.3)
BILIRUB SERPL-MCNC: 0.8 MG/DL
BUN SERPL-MCNC: 13.5 MG/DL (ref 8–23)
CALCIUM SERPL-MCNC: 10.8 MG/DL (ref 8.8–10.4)
CHLORIDE SERPL-SCNC: 111 MMOL/L (ref 98–107)
CREAT SERPL-MCNC: 0.77 MG/DL (ref 0.67–1.17)
EGFRCR SERPLBLD CKD-EPI 2021: >90 ML/MIN/1.73M2
ERYTHROCYTE [DISTWIDTH] IN BLOOD BY AUTOMATED COUNT: 17.4 % (ref 10–15)
FLUAV RNA SPEC QL NAA+PROBE: NEGATIVE
FLUBV RNA RESP QL NAA+PROBE: NEGATIVE
GLUCOSE SERPL-MCNC: 116 MG/DL (ref 70–99)
HCO3 SERPL-SCNC: 21 MMOL/L (ref 22–29)
HCT VFR BLD AUTO: 38.3 % (ref 40–53)
HGB BLD-MCNC: 11.8 G/DL (ref 13.3–17.7)
MCH RBC QN AUTO: 19 PG (ref 26.5–33)
MCHC RBC AUTO-ENTMCNC: 30.8 G/DL (ref 31.5–36.5)
MCV RBC AUTO: 62 FL (ref 78–100)
PLATELET # BLD AUTO: 279 10E3/UL (ref 150–450)
POTASSIUM SERPL-SCNC: 3.9 MMOL/L (ref 3.4–5.3)
PROCALCITONIN SERPL IA-MCNC: 0.2 NG/ML
PROT SERPL-MCNC: 6.4 G/DL (ref 6.4–8.3)
RBC # BLD AUTO: 6.22 10E6/UL (ref 4.4–5.9)
RSV RNA SPEC NAA+PROBE: NEGATIVE
SARS-COV-2 RNA RESP QL NAA+PROBE: NEGATIVE
SODIUM SERPL-SCNC: 144 MMOL/L (ref 135–145)
TSH SERPL DL<=0.005 MIU/L-ACNC: 2.37 UIU/ML (ref 0.3–4.2)
VIT B12 SERPL-MCNC: 1109 PG/ML (ref 232–1245)
WBC # BLD AUTO: 14.4 10E3/UL (ref 4–11)

## 2025-04-26 PROCEDURE — 82607 VITAMIN B-12: CPT | Performed by: PSYCHIATRY & NEUROLOGY

## 2025-04-26 PROCEDURE — 87637 SARSCOV2&INF A&B&RSV AMP PRB: CPT | Performed by: STUDENT IN AN ORGANIZED HEALTH CARE EDUCATION/TRAINING PROGRAM

## 2025-04-26 PROCEDURE — 86800 THYROGLOBULIN ANTIBODY: CPT | Performed by: PSYCHIATRY & NEUROLOGY

## 2025-04-26 PROCEDURE — 80048 BASIC METABOLIC PNL TOTAL CA: CPT | Performed by: STUDENT IN AN ORGANIZED HEALTH CARE EDUCATION/TRAINING PROGRAM

## 2025-04-26 PROCEDURE — 86376 MICROSOMAL ANTIBODY EACH: CPT | Performed by: PSYCHIATRY & NEUROLOGY

## 2025-04-26 PROCEDURE — 250N000013 HC RX MED GY IP 250 OP 250 PS 637: Performed by: INTERNAL MEDICINE

## 2025-04-26 PROCEDURE — 36415 COLL VENOUS BLD VENIPUNCTURE: CPT | Performed by: PSYCHIATRY & NEUROLOGY

## 2025-04-26 PROCEDURE — 250N000013 HC RX MED GY IP 250 OP 250 PS 637: Performed by: HOSPITALIST

## 2025-04-26 PROCEDURE — 84432 ASSAY OF THYROGLOBULIN: CPT | Performed by: PSYCHIATRY & NEUROLOGY

## 2025-04-26 PROCEDURE — 36415 COLL VENOUS BLD VENIPUNCTURE: CPT | Performed by: HOSPITALIST

## 2025-04-26 PROCEDURE — 82247 BILIRUBIN TOTAL: CPT | Performed by: STUDENT IN AN ORGANIZED HEALTH CARE EDUCATION/TRAINING PROGRAM

## 2025-04-26 PROCEDURE — 250N000011 HC RX IP 250 OP 636: Performed by: STUDENT IN AN ORGANIZED HEALTH CARE EDUCATION/TRAINING PROGRAM

## 2025-04-26 PROCEDURE — 36415 COLL VENOUS BLD VENIPUNCTURE: CPT | Performed by: STUDENT IN AN ORGANIZED HEALTH CARE EDUCATION/TRAINING PROGRAM

## 2025-04-26 PROCEDURE — 84145 PROCALCITONIN (PCT): CPT | Performed by: STUDENT IN AN ORGANIZED HEALTH CARE EDUCATION/TRAINING PROGRAM

## 2025-04-26 PROCEDURE — 258N000003 HC RX IP 258 OP 636: Performed by: STUDENT IN AN ORGANIZED HEALTH CARE EDUCATION/TRAINING PROGRAM

## 2025-04-26 PROCEDURE — 84443 ASSAY THYROID STIM HORMONE: CPT | Performed by: STUDENT IN AN ORGANIZED HEALTH CARE EDUCATION/TRAINING PROGRAM

## 2025-04-26 PROCEDURE — 99232 SBSQ HOSP IP/OBS MODERATE 35: CPT | Performed by: STUDENT IN AN ORGANIZED HEALTH CARE EDUCATION/TRAINING PROGRAM

## 2025-04-26 PROCEDURE — 85027 COMPLETE CBC AUTOMATED: CPT | Performed by: STUDENT IN AN ORGANIZED HEALTH CARE EDUCATION/TRAINING PROGRAM

## 2025-04-26 PROCEDURE — 120N000001 HC R&B MED SURG/OB

## 2025-04-26 PROCEDURE — 95816 EEG AWAKE AND DROWSY: CPT

## 2025-04-26 PROCEDURE — 83605 ASSAY OF LACTIC ACID: CPT | Performed by: HOSPITALIST

## 2025-04-26 RX ORDER — DEXTROSE MONOHYDRATE AND SODIUM CHLORIDE 5; .9 G/100ML; G/100ML
INJECTION, SOLUTION INTRAVENOUS CONTINUOUS
Status: DISCONTINUED | OUTPATIENT
Start: 2025-04-26 | End: 2025-04-27

## 2025-04-26 RX ADMIN — CLONIDINE HYDROCHLORIDE 0.1 MG: 0.1 TABLET ORAL at 21:22

## 2025-04-26 RX ADMIN — DEXTROSE AND SODIUM CHLORIDE: 5; .9 INJECTION, SOLUTION INTRAVENOUS at 21:13

## 2025-04-26 RX ADMIN — ENOXAPARIN SODIUM 40 MG: 40 INJECTION SUBCUTANEOUS at 09:03

## 2025-04-26 RX ADMIN — MELATONIN TAB 3 MG 3 MG: 3 TAB at 21:23

## 2025-04-26 RX ADMIN — PIPERACILLIN AND TAZOBACTAM 4.5 G: 4; .5 INJECTION, POWDER, FOR SOLUTION INTRAVENOUS at 16:10

## 2025-04-26 RX ADMIN — ENOXAPARIN SODIUM 40 MG: 40 INJECTION SUBCUTANEOUS at 21:13

## 2025-04-26 RX ADMIN — PIPERACILLIN AND TAZOBACTAM 4.5 G: 4; .5 INJECTION, POWDER, FOR SOLUTION INTRAVENOUS at 09:03

## 2025-04-26 RX ADMIN — DEXTROSE AND SODIUM CHLORIDE: 5; .9 INJECTION, SOLUTION INTRAVENOUS at 00:46

## 2025-04-26 RX ADMIN — DEXTROSE AND SODIUM CHLORIDE: 5; .9 INJECTION, SOLUTION INTRAVENOUS at 12:49

## 2025-04-26 RX ADMIN — PIPERACILLIN AND TAZOBACTAM 4.5 G: 4; .5 INJECTION, POWDER, FOR SOLUTION INTRAVENOUS at 21:12

## 2025-04-26 RX ADMIN — ACETAMINOPHEN 650 MG: 650 SUPPOSITORY RECTAL at 23:14

## 2025-04-26 RX ADMIN — PIPERACILLIN AND TAZOBACTAM 4.5 G: 4; .5 INJECTION, POWDER, FOR SOLUTION INTRAVENOUS at 04:42

## 2025-04-26 ASSESSMENT — ACTIVITIES OF DAILY LIVING (ADL)
ADLS_ACUITY_SCORE: 72
ADLS_ACUITY_SCORE: 72
ADLS_ACUITY_SCORE: 70
ADLS_ACUITY_SCORE: 70
ADLS_ACUITY_SCORE: 82
ADLS_ACUITY_SCORE: 70
ADLS_ACUITY_SCORE: 84
ADLS_ACUITY_SCORE: 70
ADLS_ACUITY_SCORE: 72
ADLS_ACUITY_SCORE: 72
ADLS_ACUITY_SCORE: 70
ADLS_ACUITY_SCORE: 82
ADLS_ACUITY_SCORE: 72
ADLS_ACUITY_SCORE: 84
ADLS_ACUITY_SCORE: 84
ADLS_ACUITY_SCORE: 78
ADLS_ACUITY_SCORE: 70
ADLS_ACUITY_SCORE: 82
ADLS_ACUITY_SCORE: 76
ADLS_ACUITY_SCORE: 84
ADLS_ACUITY_SCORE: 84

## 2025-04-26 NOTE — PLAN OF CARE
Goal Outcome Evaluation:         Date/Time: 04/25: 1319-4747  Trauma/Ortho/Medical (Choose one): Medical  Diagnosis: Aggressive behaviors  POD#: N/A  Mental Status: Lethargic, somnolent, wakes up on and off, awakens to voice/touch.  Activity/dangle: Up with 3/Lift, t/repo  Diet: Soft/Bite size in soft tray, thin liquid  Pain: Doesn't appear in pain  Alberto/Voiding: Incontinent of bladder. Has external catheter. No BM this shift.  Tele/Restraints/Iso: None  02/LDA: RA. Is on cont pulse oxymetry sating low 90's. D5NS infusing at 100 and int antibiotics  D/C Date: TBD  Other Info: Transferred from obs unit at around 1725 today. Assist of 3 with turning. Very drowsy this shift. Does not follow commands. Mepilex on coccyx and (R) side of back. Pt is febrile, was given tylenol rectally. BC has been collected and are pending. Port chest x-ray nd brain MRI results in.

## 2025-04-26 NOTE — PLAN OF CARE
Goal Outcome Evaluation:    Date/Time: 4/25/2025 3717-0886     Trauma/Ortho/Medical (Choose one) Medical     Diagnosis: Aggressive behavior  POD#: NA  Mental Status: Alert to self  Activity/dangle: Lift / 2-3 person assist with turning  Diet: soft bite size  Pain: *  Alberto/Voiding: incontinent External cath in place  Tele/Restraints/Iso: NA  02/LDA: RA / PIV infusing  D/C Date: TBD  Other Info:  Mepilex to coccyx and R side of back intact garbled speech.

## 2025-04-26 NOTE — PROGRESS NOTES
[facility]    Medicine Progress Note - Hospitalist Service    Date of Admission:  4/4/2025    Assessment & Plan   Estevan Aaron is a 64 year old male with PMH of HTN, HLD, hyperparathyroidism, dementia who was admitted on 4/4/25 for aggressive behaviors at his care facility.     Acute toxic metabolic encephalopathy, multifactorial, ongoing  Hospital-acquired delirium  Chronic neuropsychiatric disorder w/ behavioral disturbance, unclear etiology  Reported Alzheimer's dementia  Reportedly aggressive behaviors at [facility] care. Was calm and cooperative with staff in the ED, although significant behavioral disturbance noted early this admission.  History of prolonged inpatient psychiatric hospitalization 10/2024-late 2/2025. Psychiatry involved for assistance with medication titration, however, developed severe encephalopathy with persistent somnolence.  Mental status showed gradual improvement, although worsening somnolence noted on 4/24/2025 compared to prior days. MRI brain 4/24 without acute changes. At this time, unclear if ongoing AMS with somnolence is in setting of slow drug metabolism versus hypoactive delirium with underlying dementia versus other toxic/metabolic etiology.   - Holding seroquel, depakote  - Continue D5-NS 100ml/hr   + Hopefully wean off 4/27 given slow improvement in PO intake  - Delirium precautions  - Neurology, Psychology, SLP following    Intermittent fever w/ leukocytosis, unclear etiology  Acute hypoxic hypercarbic respiratory failure, resolved  Concern for aspiration PNA vs pneumonitis  Sepsis w/ elevated lactate, resolved  Respiratory acidosis w/ metabolic compensation, resolved  The house team was called early morning on 4/20/25 due patient sleeping since 4/19/25 morning along with development of congested cough, hypoxia to 84%, and borderline fever. On exam, somnolence noted without associated respiratory distress, other concerns. Briefly required BiPAP and  quickly weaned to O2 NC. CXR without opacity, although some concern for acute aspiration event in setting of suspected medication-mediated somnolence as above.  Now weaned off of supplemental oxygen with intermittent fevers and leukocytosis of unclear etiology.  UA noninfectious, no hemodynamic changes, no ongoing respiratory symptoms, COVID/flu negative, CXR stable, pressure injury noted without concern for infection, no other skin changes, and MRI without acute change. LFTs mildly elevated but no apparent pain with deep abdominal palpation. No rigidity on exam and no new medications. Initially considered LP to rule out meningitis, although no nuchal rigidity and mental status now improving. Overall unclear exact etiology of fevers and challenging assessment given underlying dementia.  - O2 goal >90%, currently on RA  - Continue zosyn   + Follow-up cultures  - Defer LP for now   + If ongoing fevers without return to baseline mental status, plan to pursue LP  - Continue holding furosemide  - Continue holding psychoactive medications    Elevated LFTs  Unclear etiology of mild elevation, no history of liver disease or failure. Possibly in setting of statin use. Bili ~normal and no other culprit medication.  - Continue holding statin  - Daily LFTs     Deep tissue pressure injury, sacrococcygeal area, hospital-acquired - No signs of infection. WOC RN following  Suspected ADAM - No formal sleep study, although high suspicion. Outpatient sleep center referral on discharge  HTN - Continue PTA amlodipine, clonidine, lisinopril, metoprolol. Hydralazine prn for SBP >180  HLD - Holding statin given liver dysfunction  Hyperparathyroidism - Continue cinacalcet  Hypernatremia, mild, resolved          Diet: Snacks/Supplements Adult: Ensure Enlive; With Meals  Room Service  Combination Diet Safe Tray - NO utensils; Soft and Bite Sized Diet (level 6); Thin Liquids (level 0)    DVT Prophylaxis: Enoxaparin (Lovenox) SQ  Alberto Catheter:  Not present  Lines: None     Cardiac Monitoring: None  Code Status: No CPR- Do NOT Intubate      Clinically Significant Risk Factors          # Hyperchloremia: Highest Cl = 111 mmol/L in last 2 days, will monitor as appropriate       # Hypercalcemia: Highest Ca = 10.9 mg/dL in last 2 days, will monitor as appropriate    # Hypoalbuminemia: Lowest albumin = 3 g/dL at 4/26/2025  7:59 AM, will monitor as appropriate     # Hypertension: Noted on problem list     # Acute Hypercapnic Respiratory Failure: based on venous blood gas results.  Continue supplemental oxygen and ventilatory support as indicated.    # Dementia: noted on problem list            # Financial/Environmental Concerns: none         Social Drivers of Health    Food Insecurity: Unknown (4/10/2025)    Food Insecurity     Within the past 12 months, did you worry that your food would run out before you got money to buy more?: Patient unable to answer     Within the past 12 months, did the food you bought just not last and you didn t have money to get more?: Patient unable to answer   Housing Stability: Unknown (4/10/2025)    Housing Stability     Do you have housing? : Patient unable to answer     Are you worried about losing your housing?: Patient unable to answer   Tobacco Use: High Risk (10/28/2024)    Patient History     Smoking Tobacco Use: Every Day     Smokeless Tobacco Use: Never   Financial Resource Strain: Unknown (4/10/2025)    Financial Resource Strain     Within the past 12 months, have you or your family members you live with been unable to get utilities (heat, electricity) when it was really needed?: Patient unable to answer   Transportation Needs: Unknown (4/10/2025)    Transportation Needs     Within the past 12 months, has lack of transportation kept you from medical appointments, getting your medicines, non-medical meetings or appointments, work, or from getting things that you need?: Patient unable to answer   Physical Activity:  Insufficiently Active (2/20/2024)    Exercise Vital Sign     Days of Exercise per Week: 2 days     Minutes of Exercise per Session: 10 min   Interpersonal Safety: Unknown (4/10/2025)    Interpersonal Safety     Do you feel physically and emotionally safe where you currently live?: Patient unable to answer     Within the past 12 months, have you been hit, slapped, kicked or otherwise physically hurt by someone?: Patient unable to answer     Within the past 12 months, have you been humiliated or emotionally abused in other ways by your partner or ex-partner?: Patient unable to answer   Stress: Stress Concern Present (2/20/2024)    [place] [employer] - Occupational Stress Questionnaire     Feeling of Stress : To some extent   Social Connections: Unknown (2/20/2024)    Social Connection and Isolation Panel [NHANES]     Frequency of Social Gatherings with Friends and Family: Once a week          Disposition Plan     Medically Ready for Discharge: Anticipated in 2-4 Days     Troy Farley MD  Hospitalist Service  [facility]  Securely message with [employer] (more info)  Text page via [employer] Paging/Directory   ______________________________________________________________________    Interval History   Mental status much improved today. Intermittently agitated and yelling (closer to baseline). Planning to involve neurology.    Physical Exam   Vital Signs: Temp: 99.9  F (37.7  C) Temp src: Oral BP: (!) 156/85 Pulse: 77   Resp: 16 SpO2: 94 % O2 Device: None (Room air)    Weight: 0 lbs 0 oz    General: Awake, intermittent agitation/yelling, sitting upright in bed  HEENT: Atraumatic, normocephalic, EOMI, no scleral icterus  CV: RRR, no murmurs, no MARIA ANTONIA, distal pulses intact  Pulm: Coarse breath sounds, breathing comfortably on RA, no wheezes, no crackles  Abd: Soft, obese, non-tender, non-distended  Skin: No rashes, lesions, or wounds visualized  Neuro: AOx0, withdraws to pain,  moving all extremities spontaneously, speech garbled and non-sensical, arouses to voice, tracks provider in room    Medical Decision Making       45 MINUTES SPENT BY ME on the date of service doing chart review, history, exam, documentation & further activities per the note.      Data   ------------------------- PAST 24 HR DATA REVIEWED -----------------------------------------------

## 2025-04-26 NOTE — PROVIDER NOTIFICATION
MD Notification    Notified Person: MD    Notified Person Name: Shlomo    Notification Date/Time: 4-25-25, 2150    Notification Interaction: vocera    Purpose of Notification: It appears this patient has oral thrush. Tongue has thick white coating. Interventions?    Orders Received:    Comments:

## 2025-04-26 NOTE — PLAN OF CARE
Date/Time: 4/26/25 0700 - 1900  Diagnosis: Aggressive behaviors at his care facility  Mental Status: NARESH. A times lethargic, somnolent and wakes up on and off.  Activity/dangle: Ast of 2 Lift, turn and repo as needed but at times pt not compliant  Diet: Soft bite size w/ thin liquid, very poor intake  Pain: NARESH, pt comfortable in bed w/ not mourning or crying  Alberto/Voiding: Incontinent B&B  Tele/Restraints/Iso: N/A  02/LDA: Room air. IV saline locked  D/C Date: TBD

## 2025-04-26 NOTE — H&P
Neurology Consultation    Estevan Aaron MRN# [id number]   YOB: 1960 Age: 65 year old    Code Status:No CPR- Do NOT Intubate   Date of Admission: 4/4/2025  Date of Consult:04/26/2025                                                                                     Reason for consult: This neurological consultation was requested by Dr. Kong to assess this patient for neuropsychiatric symptoms       Chief Complaint:   Mr. Aaron is a 64-year-old patient with a history for psychiatric disorder, diagnosed with anxiety and psychosis in the past, with mention of neurocognitive disorder, mention of Alzheimer's disease but was admitted from his memory care unit because of concerns regarding agitation on 4/4/2025.  As the patient patient became more somnolent, psychiatry recommending decreasing his Seroquel, it is now held, they also recommended because of continued issues of somnolence and lethargy, to hold his Depakote as of 4/25/2025           History of Present Illness:   This patient is a 65 year old male who has a history for hyperlipidemia, hypertension, hyperparathyroidism, thalassemia and tobacco use disorder and has been followed by psychiatry with anxiety disorder and resides in a memory care unit and has been followed by psychiatry for a neurocognitive disorder.  It would appear that his first psychiatric admission occurred 11/25/2024 when he was admitted to the [facility] with a previous psychiatric diagnosis of GEORGINA due to concerns of psychosis in the context of thyroidism and hypercalcemia and psychosocial stressors.  He had been  previously, and his divorce had occurred around the time of that hospitalization, his wife at reported that he had had paranoid ideations since she first met him and it would appear that there was suspicion of a diagnosis of paranoid personality disorder but that his symptoms worsened following the COVID pandemic.  During that  hospitalization, 10/24, he was neurocognitive disorder with delirium with symptoms exacerbated by his medical condition.  The brain MRI at that time was reported as showing diffuse sulci  #-It is unclear as to when his cognitive decline was first observed, but on reviewing his note of 2/24/2025 there was mention of his primary psychiatric diagnoses being Alzheimer's disease and the secondary psychosis  #-Brain MRI 4/24/2025 is again significant for mild to moderate generalized cerebral atrophy, no evidence of acute intracranial hemorrhage, no evidence for acute or subacute infarct, no evidence for extra-axial fluid collection.  In addition he had scattered, nonspecific T2/FLAIR hyperintense within the cerebral hemispheres most consistent with mild chronic microvascular ischemic changes.  However, some images were degraded by motion but there were no acute changes or mass effect.  This was done without IV contrast.    No family history for early onset dementia, it is unclear if there has been accelerated cognitive decline over the past 6 months.    Valproic acid level 4/25/2025 was 36.9, procalcitonin was 0.20' calcium was 10.8, TSH pending, vitamin B12 pending, vitamin B1 pending         Past Medical History:     Past Medical History:   Diagnosis Date    Kidney stone     Serum calcium elevated     Tobacco use disorder     tobacco quit line referral done 4/19/06         Past Surgical History:     Past Surgical History:   Procedure Laterality Date    ANESTHESIA OUT OF OR MRI N/A 10/28/2024    Procedure: 1.5 MRI Brain;  Surgeon: GENERIC ANESTHESIA PROVIDER;  Location: [facility] OR    ROTATOR CUFF REPAIR RT/LT Right 2021          Social History:     Social History     Socioeconomic History    Marital status:      Spouse name: Sharon     Number of children: 3    Years of education: 12    Occupational History    Occupation:       Employer: SELF   Tobacco Use    Smoking status: Every Day     Current  packs/day: 1.00     Average packs/day: 1 pack/day for 35.0 years (35.0 ttl pk-yrs)     Types: Cigarettes    Smokeless tobacco: Never   Vaping Use    Vaping status: Never Used   Substance and Sexual Activity    Alcohol use: Yes     Alcohol/week: 0.0 standard drinks of alcohol    Drug use: No     Comment: no herbal meds either     Sexual activity: Yes     Partners: Female     Comment:  - Sharon    Other Topics Concern     Service No    Blood Transfusions No    Caffeine Concern Yes     Comment: 2- 20oz. cups coffee daily     Exercise Yes     Comment: not regular     Seat Belt Yes     Comment: always     Self-Exams Yes     Social Drivers of Health     Financial Resource Strain: Unknown (4/10/2025)    Financial Resource Strain     Within the past 12 months, have you or your family members you live with been unable to get utilities (heat, electricity) when it was really needed?: Patient unable to answer   Food Insecurity: Unknown (4/10/2025)    Food Insecurity     Within the past 12 months, did you worry that your food would run out before you got money to buy more?: Patient unable to answer     Within the past 12 months, did the food you bought just not last and you didn t have money to get more?: Patient unable to answer   Transportation Needs: Unknown (4/10/2025)    Transportation Needs     Within the past 12 months, has lack of transportation kept you from medical appointments, getting your medicines, non-medical meetings or appointments, work, or from getting things that you need?: Patient unable to answer   Physical Activity: Insufficiently Active (2/20/2024)    Exercise Vital Sign     Days of Exercise per Week: 2 days     Minutes of Exercise per Session: 10 min   Stress: Stress Concern Present (2/20/2024)    [employer] [place] [employer] - Occupational Stress Questionnaire     Feeling of Stress : To some extent   Social Connections: Unknown (2/20/2024)    Social Connection and Isolation  Panel [NHANES]     Frequency of Social Gatherings with Friends and Family: Once a week   Interpersonal Safety: Unknown (4/10/2025)    Interpersonal Safety     Do you feel physically and emotionally safe where you currently live?: Patient unable to answer     Within the past 12 months, have you been hit, slapped, kicked or otherwise physically hurt by someone?: Patient unable to answer     Within the past 12 months, have you been humiliated or emotionally abused in other ways by your partner or ex-partner?: Patient unable to answer   Housing Stability: Unknown (4/10/2025)    Housing Stability     Do you have housing? : Patient unable to answer     Are you worried about losing your housing?: Patient unable to answer     Patient denies smoking, no significant alcohol intake, denies illicit drugs use       Family History:     Family History   Problem Relation Age of Onset    Hyperlipidemia Father     Prostate Cancer Father          age 59 cancer prostate, liver    Hypertension Father     No Known Problems Sister     No Known Problems Sister     No Known Problems Sister     Cancer Maternal Grandmother          of cancer    Cerebrovascular Disease Maternal Grandfather     No Known Problems Daughter     No Known Problems Daughter     No Known Problems Daughter     Diabetes No family hx of     Coronary Artery Disease No family hx of     Breast Cancer No family hx of     Colon Cancer No family hx of      Reviewed and not felt to be contributory.        Home Medications:     Prior to Admission Medications   Prescriptions Last Dose Informant Patient Reported? Taking?   OLANZapine (ZYPREXA) 10 MG tablet 2025 Morning Nursing Home Yes Yes   Sig: 10 mg 2 times daily. At 7am and 5pm   OLANZapine (ZYPREXA) 15 MG tablet 4/3/2025 Evening Nursing Home Yes Yes   Sig: Take 15 mg by mouth at bedtime.   amLODIPine (NORVASC) 10 MG tablet 2025 Morning Nursing Home No Yes   Sig: Take 1 tablet (10 mg) by mouth daily    atorvastatin (LIPITOR) 40 MG tablet 4/3/2025 Evening Nursing Home No Yes   Sig: Take 1 tablet (40 mg) by mouth daily.   cinacalcet (SENSIPAR) 30 MG tablet 4/4/2025 Morning Nursing Home No Yes   Sig: Take 1 tablet (30 mg) by mouth daily.   cloNIDine (CATAPRES) 0.1 MG tablet 4/4/2025 Morning Nursing Home No Yes   Sig: Take 1 tablet (0.1 mg) by mouth 2 times daily   furosemide (LASIX) 40 MG tablet 4/4/2025 Morning Nursing Home No Yes   Sig: Take 1 tablet (40 mg) by mouth daily.   lisinopril (ZESTRIL) 40 MG tablet 4/4/2025 Morning Nursing Home No Yes   Sig: Take 1 tablet (40 mg) by mouth daily.   melatonin 3 MG tablet 4/3/2025 Bedtime Nursing Home Yes Yes   Sig: Take 3 mg by mouth at bedtime.   metoprolol succinate ER (TOPROL XL) 50 MG 24 hr tablet 4/4/2025 Morning Nursing Home No Yes   Sig: Take 1 tablet (50 mg) by mouth daily.   sennosides (SENOKOT) 8.6 MG tablet 4/4/2025 Morning Nursing Home Yes Yes   Sig: Take 1 tablet by mouth daily.      Facility-Administered Medications: None          Allergy:   No Known Allergies       Inpatient Medications:   Scheduled Meds:  Current Facility-Administered Medications   Medication Dose Route Frequency Provider Last Rate Last Admin    amLODIPine (NORVASC) tablet 10 mg  10 mg Oral Daily Troy Farley MD   10 mg at 04/23/25 1124    [Held by provider] atorvastatin (LIPITOR) tablet 40 mg  40 mg Oral Daily Armando Kern MD   40 mg at 04/20/25 2131    cinacalcet (SENSIPAR) tablet 30 mg  30 mg Oral Daily Evelin Nava MD   30 mg at 04/22/25 1134    cloNIDine (CATAPRES) tablet 0.1 mg  0.1 mg Oral BID Armando Kern MD   0.1 mg at 04/23/25 2121    [Held by provider] divalproex sodium extended-release (DEPAKOTE ER) 24 hr tablet 1,000 mg  1,000 mg Oral Q12H JOHN (08/20) Evelin Nava MD   1,000 mg at 04/23/25 2121    Or    [Held by provider] divalproex sodium delayed-release (DEPAKOTE SPRINKLE) DR capsule 1,000 mg  1,000 mg Oral Q12H [facility] (08/20) Elijah  MD Evelin   1,000 mg at 04/22/25 1139    enoxaparin ANTICOAGULANT (LOVENOX) injection 40 mg  40 mg Subcutaneous Q12H Troy Farley MD   40 mg at 04/26/25 0903    [Held by provider] furosemide (LASIX) tablet 40 mg  40 mg Oral Daily Armando Kern MD   40 mg at 04/19/25 0902    lisinopril (ZESTRIL) tablet 40 mg  40 mg Oral Daily Troy Farley MD   40 mg at 04/23/25 1124    melatonin tablet 3 mg  3 mg Oral At Bedtime Oleksandr Kong MD   3 mg at 04/23/25 2121    metoprolol succinate ER (TOPROL XL) 24 hr tablet 50 mg  50 mg Oral Daily Armando Kern MD   50 mg at 04/22/25 1133    piperacillin-tazobactam (ZOSYN) 4.5 g vial to attach to  mL bag  4.5 g Intravenous Q6H Troy Farley MD   4.5 g at 04/26/25 0903    [Held by provider] QUEtiapine ER (SEROquel XR) 24 hr tablet 300 mg  300 mg Oral At Bedtime Troy Farley MD   300 mg at 04/21/25 2121    sennosides (SENOKOT) tablet 1 tablet  1 tablet Oral Daily Armando Kern MD   1 tablet at 04/21/25 0936     PRN Meds:   Current Facility-Administered Medications   Medication Dose Route Frequency Provider Last Rate Last Admin    acetaminophen (TYLENOL) tablet 650 mg  650 mg Oral Q4H PRN Oleksandr Kong MD   650 mg at 04/23/25 2136    Or    acetaminophen (TYLENOL) Suppository 650 mg  650 mg Rectal Q4H PRN Oleksandr Kong MD   650 mg at 04/25/25 2143    carboxymethylcellulose PF (REFRESH PLUS) 0.5 % ophthalmic solution 1 drop  1 drop Both Eyes Q1H PRN Bill Zamarripa APRN CNP        hydrALAZINE (APRESOLINE) injection 10 mg  10 mg Intravenous Q6H PRN Troy Farley MD   10 mg at 04/24/25 1948    lidocaine (LMX4) cream   Topical Q1H PRN Oleksandr Kong MD        lidocaine 1 % 0.1-1 mL  0.1-1 mL Other Q1H PRN Oleksandr Kong MD        No lozenges or gum should be given while patient on BIPAP/AVAPS/AVAPS AE   Does not apply Continuous PRN Bill Zamarripa APRN CNP        nystatin (MYCOSTATIN) suspension 500,000 Units  500,000 Units Swish & Swallow  4x Daily PRN Shantell Keenan MD        [Held by provider] OLANZapine (zyPREXA) injection 5 mg  5 mg Intramuscular Daily PRN Evelin Nava MD   5 mg at 04/18/25 1943    ondansetron (ZOFRAN ODT) ODT tab 4 mg  4 mg Oral Q6H PRN Oleksandr Kong MD        Or    ondansetron (ZOFRAN) injection 4 mg  4 mg Intravenous Q6H PRN Oleksandr Kong MD        Patient may continue current oral medications   Does not apply Continuous PRN Bill Zamarripa APRN CNP        polyethylene glycol (MIRALAX) Packet 17 g  17 g Oral BID PRN Oleksandr Kong MD   17 g at 04/10/25 0906    prochlorperazine (COMPAZINE) injection 5 mg  5 mg Intravenous Q6H PRN Oleksandr Kong MD        Or    prochlorperazine (COMPAZINE) tablet 5 mg  5 mg Oral Q6H PRN Oleksandr Kong MD        [Held by provider] QUEtiapine (SEROquel) half-tab 12.5 mg  12.5 mg Oral TID PRN Troy Farley MD        senna-docusate (SENOKOT-S/PERICOLACE) 8.6-50 MG per tablet 1 tablet  1 tablet Oral BID PRN Oleksandr Kong MD   1 tablet at 04/06/25 1601    Or    senna-docusate (SENOKOT-S/PERICOLACE) 8.6-50 MG per tablet 2 tablet  2 tablet Oral BID PRN Oleksanrd Kong MD                 Physical Exam:   Physical Exam   Vitals:  Height:Data Unavailable  Weight:0 lbs 0 oz   Temp: 100.5  F (38.1  C) Temp src: Axillary BP: (!) 174/90 Pulse: 80   Resp: 20 SpO2: 93 % O2 Device: None (Room air)    General Appearance: No acute distress, normal body habitus and was normocephalic and had no signs of acute head or neck trauma.  Neuro Exam:  Mental Status Exam: He was somnolent and only responding to sternal rub with brief opening of his eyes and avoidance, moving his hands symmetrically.  He did respond to his name saying what but was very drowsy and not able to follow even simple commands.  Cranial Nerves: Could not be assessed due to the patient's somnolence.    Motor: He had normal tone in all 4 extremities and there were no dystonic movements.  He did move both upper extremities  symmetrically with avoidance and was very annoyed with plantar stimulation but did withdraw both legs symmetrically.  Reflexes: Symmetrically 1+/4 in the biceps and triceps, trace at the patella and a trace at the ankles, plantar responses are equivocal due to withdrawal.  Sensory: Could not be assessed due to the patient's somnolent  Coordination: Could not be assessed  Gait: Could not be assessed  Neck: No nuchal rigidity  Extremities: No clubbing, no cyanosis, no edema          Data:   ROUTINE IP LABS   CBC RESULTS:     Recent Labs   Lab 04/26/25 0759 04/25/25  0618 04/23/25  0619   WBC 14.4* 13.1* 9.4   RBC 6.22* 6.45* 6.54*   HGB 11.8* 12.4* 12.6*   HCT 38.3* 39.3* 41.1    237 167     Basic Metabolic Panel:   Recent Labs   Lab Test 04/26/25 0759 04/25/25  0618 04/24/25  0622    140 141   POTASSIUM 3.9 3.8 4.0   CHLORIDE 111* 108* 105   CO2 21* 22 25   BUN 13.5 13.6 14.1   CR 0.77 0.71 0.82   * 135* 116*   UDAY 10.8* 10.9* 10.9*     Liver panel:  Recent Labs   Lab Test 04/26/25  0759 04/24/25  0622 04/23/25  0619 04/22/25  0609 04/21/25  1403   PROTTOTAL 6.4 6.8 6.3* 6.3* 6.4   ALBUMIN 3.0* 3.4* 3.3* 3.3* 3.4*   BILITOTAL 0.8 0.6 0.7 0.8 0.8   ALKPHOS 98 97 79 73 76   AST 85* 69* 74* 71* 90*   ALT 74* 43 37 29 31     Lipid Profile:  Recent Labs   Lab Test 10/13/24  0805 02/20/24  0933 02/17/23  1159 01/18/22  0851 10/01/21  1503   CHOL 210* 192 187 180 202*   HDL 30* 34* 30* 31* 28*   * 109* 126* 123* 114*   TRIG 362* 244* 157* 131 298*     Thyroid Panel:  Recent Labs   Lab Test 10/13/24  0805 10/10/24  2205   TSH  --  2.99   T4 1.13  --       Vitamin B12:   Recent Labs   Lab Test 10/13/24  0805   B12 493           IMAGING:   Independent interpretation of the following studies by myself as part of today's encounter.   CT head:   --    --  MRI brain: 4/24/25  --There is no mass effect, midline shift, or intracranial hemorrhage.  -There is diffuse sulcal prominence particularly  affecting bilateral  frontal lobes and parietal lobes due to underlying volume loss. The  ventricles are proportionate to the cerebral sulci.   -Diffusion and susceptibility weighted images are negative for acute/focal  abnormality.   limits.  Few scattered T2 hyperintense foci throughout the periventricular and  subcortical white matter which are nonspecific but favored to  represent chronic small vessel ischemic disease given patient's age.     No suspicious abnormality of the skull marrow signal.  No focal  abnormality of the pituitary gland or sella.           Assessment and Plan:                                         #.   -- This is a 65-year-old patient who is being assessed for neurocognitive disorder of unclear etiology, prior mention of Alzheimer's disease, on review of his records, it would appear that he became with psychiatric symptoms warranting admission in November 2024 at that time, it was noted that his decompensation might have been associated with acute, toxic/metabolic encephalopathy related to acute hypoxic hypercarbic respiratory failure, resolved, intermittent fever of unclear etiology, sepsis with elevated lactate, resolved.  Recent admission from a memory care unit because of concerns with agitated delirium, however, during this admission patient became more somnolent and despite this.  Continuing Seroquel continued and now his Depakote is being held.  Symptomatic psychiatric symptoms  #.   --Impression   - Acute agitated delirium, due to toxic/metabolic derangement, encephalopathy, multifactorial the etiology, improved, however, the patient is now more somnolent and sedated.  -Chronic neuropsychiatric disorder of unclear etiology, patient is being followed by psychiatry, however is unclear as to whether his symptoms have been accelerated which would warrant further studies doing about her studies for autoimmune paraneoplastic panel, thyroid thyroid peroxidase, thyroglobulin antibody  level, repeat vitamin B12 level, vitamin B1 level,  --Routine EEG  --- Should also consider lumbar puncture for CSF studies.  Neurology will follow this patient along with you and make further recommendations in the future, as indicated.    Time:  60 minutes evaluation and management.     Rebeca Schmidt M.D.  [facility]  Office [phone]

## 2025-04-27 LAB
ALBUMIN SERPL BCG-MCNC: 3.1 G/DL (ref 3.5–5.2)
ALP SERPL-CCNC: 121 U/L (ref 40–150)
ALT SERPL W P-5'-P-CCNC: 79 U/L (ref 0–70)
ANION GAP SERPL CALCULATED.3IONS-SCNC: 10 MMOL/L (ref 7–15)
AST SERPL W P-5'-P-CCNC: 71 U/L (ref 0–45)
BILIRUB DIRECT SERPL-MCNC: 0.27 MG/DL (ref 0–0.3)
BILIRUB SERPL-MCNC: 0.7 MG/DL
BUN SERPL-MCNC: 13.8 MG/DL (ref 8–23)
CALCIUM SERPL-MCNC: 10.9 MG/DL (ref 8.8–10.4)
CHLORIDE SERPL-SCNC: 109 MMOL/L (ref 98–107)
CREAT SERPL-MCNC: 0.74 MG/DL (ref 0.67–1.17)
CRP SERPL-MCNC: 62.51 MG/L
EGFRCR SERPLBLD CKD-EPI 2021: >90 ML/MIN/1.73M2
ERYTHROCYTE [DISTWIDTH] IN BLOOD BY AUTOMATED COUNT: 17.7 % (ref 10–15)
ERYTHROCYTE [SEDIMENTATION RATE] IN BLOOD BY WESTERGREN METHOD: 75 MM/HR (ref 0–20)
GLUCOSE SERPL-MCNC: 146 MG/DL (ref 70–99)
HCO3 SERPL-SCNC: 23 MMOL/L (ref 22–29)
HCT VFR BLD AUTO: 38.7 % (ref 40–53)
HGB BLD-MCNC: 12.1 G/DL (ref 13.3–17.7)
LACTATE SERPL-SCNC: 0.7 MMOL/L (ref 0.7–2)
MCH RBC QN AUTO: 19.2 PG (ref 26.5–33)
MCHC RBC AUTO-ENTMCNC: 31.3 G/DL (ref 31.5–36.5)
MCV RBC AUTO: 61 FL (ref 78–100)
PLATELET # BLD AUTO: 362 10E3/UL (ref 150–450)
POTASSIUM SERPL-SCNC: 3.7 MMOL/L (ref 3.4–5.3)
PROT SERPL-MCNC: 6.6 G/DL (ref 6.4–8.3)
PTH-INTACT SERPL-MCNC: 73 PG/ML (ref 15–65)
RBC # BLD AUTO: 6.3 10E6/UL (ref 4.4–5.9)
SODIUM SERPL-SCNC: 142 MMOL/L (ref 135–145)
WBC # BLD AUTO: 13 10E3/UL (ref 4–11)

## 2025-04-27 PROCEDURE — 82248 BILIRUBIN DIRECT: CPT | Performed by: STUDENT IN AN ORGANIZED HEALTH CARE EDUCATION/TRAINING PROGRAM

## 2025-04-27 PROCEDURE — 120N000001 HC R&B MED SURG/OB

## 2025-04-27 PROCEDURE — 99232 SBSQ HOSP IP/OBS MODERATE 35: CPT | Performed by: STUDENT IN AN ORGANIZED HEALTH CARE EDUCATION/TRAINING PROGRAM

## 2025-04-27 PROCEDURE — 36415 COLL VENOUS BLD VENIPUNCTURE: CPT | Performed by: PSYCHIATRY & NEUROLOGY

## 2025-04-27 PROCEDURE — 36415 COLL VENOUS BLD VENIPUNCTURE: CPT | Performed by: STUDENT IN AN ORGANIZED HEALTH CARE EDUCATION/TRAINING PROGRAM

## 2025-04-27 PROCEDURE — 86255 FLUORESCENT ANTIBODY SCREEN: CPT | Performed by: PSYCHIATRY & NEUROLOGY

## 2025-04-27 PROCEDURE — 250N000013 HC RX MED GY IP 250 OP 250 PS 637: Performed by: STUDENT IN AN ORGANIZED HEALTH CARE EDUCATION/TRAINING PROGRAM

## 2025-04-27 PROCEDURE — 82565 ASSAY OF CREATININE: CPT | Performed by: STUDENT IN AN ORGANIZED HEALTH CARE EDUCATION/TRAINING PROGRAM

## 2025-04-27 PROCEDURE — 83970 ASSAY OF PARATHORMONE: CPT | Performed by: STUDENT IN AN ORGANIZED HEALTH CARE EDUCATION/TRAINING PROGRAM

## 2025-04-27 PROCEDURE — 250N000013 HC RX MED GY IP 250 OP 250 PS 637: Performed by: HOSPITALIST

## 2025-04-27 PROCEDURE — 85014 HEMATOCRIT: CPT | Performed by: STUDENT IN AN ORGANIZED HEALTH CARE EDUCATION/TRAINING PROGRAM

## 2025-04-27 PROCEDURE — 250N000011 HC RX IP 250 OP 636: Performed by: STUDENT IN AN ORGANIZED HEALTH CARE EDUCATION/TRAINING PROGRAM

## 2025-04-27 PROCEDURE — 250N000013 HC RX MED GY IP 250 OP 250 PS 637: Performed by: INTERNAL MEDICINE

## 2025-04-27 PROCEDURE — 86140 C-REACTIVE PROTEIN: CPT | Performed by: PSYCHIATRY & NEUROLOGY

## 2025-04-27 PROCEDURE — 85652 RBC SED RATE AUTOMATED: CPT | Performed by: PSYCHIATRY & NEUROLOGY

## 2025-04-27 PROCEDURE — 258N000003 HC RX IP 258 OP 636: Performed by: STUDENT IN AN ORGANIZED HEALTH CARE EDUCATION/TRAINING PROGRAM

## 2025-04-27 RX ORDER — CINACALCET 30 MG/1
60 TABLET, FILM COATED ORAL DAILY
Status: DISCONTINUED | OUTPATIENT
Start: 2025-04-28 | End: 2025-04-30

## 2025-04-27 RX ADMIN — ENOXAPARIN SODIUM 40 MG: 40 INJECTION SUBCUTANEOUS at 20:40

## 2025-04-27 RX ADMIN — PIPERACILLIN AND TAZOBACTAM 4.5 G: 4; .5 INJECTION, POWDER, FOR SOLUTION INTRAVENOUS at 09:00

## 2025-04-27 RX ADMIN — PIPERACILLIN AND TAZOBACTAM 4.5 G: 4; .5 INJECTION, POWDER, FOR SOLUTION INTRAVENOUS at 03:53

## 2025-04-27 RX ADMIN — CLONIDINE HYDROCHLORIDE 0.1 MG: 0.1 TABLET ORAL at 08:54

## 2025-04-27 RX ADMIN — CINACALCET 30 MG: 30 TABLET ORAL at 08:54

## 2025-04-27 RX ADMIN — ENOXAPARIN SODIUM 40 MG: 40 INJECTION SUBCUTANEOUS at 08:55

## 2025-04-27 RX ADMIN — MELATONIN TAB 3 MG 3 MG: 3 TAB at 20:42

## 2025-04-27 RX ADMIN — CLONIDINE HYDROCHLORIDE 0.1 MG: 0.1 TABLET ORAL at 20:40

## 2025-04-27 RX ADMIN — PIPERACILLIN AND TAZOBACTAM 4.5 G: 4; .5 INJECTION, POWDER, FOR SOLUTION INTRAVENOUS at 21:38

## 2025-04-27 RX ADMIN — LISINOPRIL 40 MG: 40 TABLET ORAL at 08:54

## 2025-04-27 RX ADMIN — SENNOSIDES 1 TABLET: 8.6 TABLET ORAL at 08:54

## 2025-04-27 RX ADMIN — METOPROLOL SUCCINATE 50 MG: 50 TABLET, EXTENDED RELEASE ORAL at 08:54

## 2025-04-27 RX ADMIN — PIPERACILLIN AND TAZOBACTAM 4.5 G: 4; .5 INJECTION, POWDER, FOR SOLUTION INTRAVENOUS at 16:24

## 2025-04-27 RX ADMIN — DEXTROSE AND SODIUM CHLORIDE: 5; .9 INJECTION, SOLUTION INTRAVENOUS at 06:28

## 2025-04-27 RX ADMIN — AMLODIPINE BESYLATE 10 MG: 10 TABLET ORAL at 08:54

## 2025-04-27 ASSESSMENT — ACTIVITIES OF DAILY LIVING (ADL)
ADLS_ACUITY_SCORE: 88
ADLS_ACUITY_SCORE: 76
ADLS_ACUITY_SCORE: 88
ADLS_ACUITY_SCORE: 76
ADLS_ACUITY_SCORE: 84
ADLS_ACUITY_SCORE: 88
ADLS_ACUITY_SCORE: 86
ADLS_ACUITY_SCORE: 84
ADLS_ACUITY_SCORE: 86
ADLS_ACUITY_SCORE: 80
ADLS_ACUITY_SCORE: 76
ADLS_ACUITY_SCORE: 88
ADLS_ACUITY_SCORE: 88
ADLS_ACUITY_SCORE: 84
ADLS_ACUITY_SCORE: 76
ADLS_ACUITY_SCORE: 76
ADLS_ACUITY_SCORE: 88
ADLS_ACUITY_SCORE: 78
ADLS_ACUITY_SCORE: 76
ADLS_ACUITY_SCORE: 84
ADLS_ACUITY_SCORE: 88

## 2025-04-27 NOTE — PLAN OF CARE
Date/Time: 4/27/25 0700 - 1100  Diagnosis: Aggressive behaviors at his care facility  Mental Status: NARESH.  Activity/dangle: Ast of 2 Lift  Diet: Soft bite size w/ thin liquid, very poor intake  Pain: NARESH, pt comfortable in bed w/ no mourning or crying  Alberto/Voiding: Incontinent B&B  Tele/Restraints/Iso: N/A  02/LDA: Room air. IV saline locked  D/C Date: TBD

## 2025-04-27 NOTE — PLAN OF CARE
Diagnosis:Aggressive behavior, encephalopathy, delirium, hx of dementia  Mental Status:Dis x4  Activity/dangle: Lift, q2 turn and repo  Diet:Soft and bite size, thin liquids, feeder   Pain:No prns  Alberto/Voiding:Incontinent, external cath   02/LDA:S/L  D/C Date:TBD     no

## 2025-04-27 NOTE — PROGRESS NOTES
[facility]  Neuro[facility] [place]  Neurology Daily Note                Interval History:   Today he is notably more alert, not his somnolent and lethargic as he was yesterday, vital signs have been stable.  Routine EEG 4/26/2025 did not reveal any epileptiform discharges or electrographic seizures, there was generalized theta slowing of the background activity which would indicate diffuse neuronal dysfunction as would be seen in encephalopathy, toxic/metabolic/septic.  Valproic acid level was 36 .9  Santos london is a 65 year old male who has a history for hyperlipidemia, hypertension, hyperparathyroidism, thalassemia and tobacco use disorder and has been followed by psychiatry with anxiety disorder and resides in a memory care unit and has been followed by psychiatry for a neurocognitive disorder.  It would appear that his first psychiatric admission occurred 11/25/2024 when he was admitted to the [facility] with a previous psychiatric diagnosis of GEORGINA due to concerns of psychosis in the context of thyroidism and hypercalcemia and psychosocial stressors.  He had been  previously, and his divorce had occurred around the time of that hospitalization, his wife at reported that he had had paranoid ideations since she first met him and it would appear that there was suspicion of a diagnosis of paranoid personality disorder but that his symptoms worsened following the COVID pandemic.  During that hospitalization, 10/24, he was neurocognitive disorder with delirium with symptoms exacerbated by his medical condition.  The brain MRI at that time was reported as showing diffuse sulci  #-It is unclear as to when his cognitive decline was first observed, but on reviewing his note of 2/24/2025 there was mention of his primary psychiatric diagnoses being Alzheimer's disease and the secondary psychosis  #-Brain MRI 4/24/2025 is again significant for mild to moderate generalized cerebral  atrophy, no evidence of acute intracranial hemorrhage, no evidence for acute or subacute infarct, no evidence for extra-axial fluid collection.  In addition he had scattered, nonspecific T2/FLAIR hyperintense within the cerebral hemispheres most consistent with mild chronic microvascular ischemic changes.  However, some images were degraded by motion but there were no acute changes or mass effect.  This was done without IV contrast.       Review of Systems:   Review of systems not obtained due to patient factors - mental status       Medications:   Scheduled Meds:  Current Facility-Administered Medications   Medication Dose Route Frequency Provider Last Rate Last Admin    amLODIPine (NORVASC) tablet 10 mg  10 mg Oral Daily Troy Farley MD   10 mg at 04/27/25 0854    [Held by provider] atorvastatin (LIPITOR) tablet 40 mg  40 mg Oral Daily Armando Kern MD   40 mg at 04/20/25 2131    cinacalcet (SENSIPAR) tablet 30 mg  30 mg Oral Daily Evelin Nava MD   30 mg at 04/27/25 0854    cloNIDine (CATAPRES) tablet 0.1 mg  0.1 mg Oral BID Armando Kern MD   0.1 mg at 04/27/25 0854    [Held by provider] divalproex sodium extended-release (DEPAKOTE ER) 24 hr tablet 1,000 mg  1,000 mg Oral Q12H [facility] (08/20) Evelin Nava MD   1,000 mg at 04/23/25 2121    Or    [Held by provider] divalproex sodium delayed-release (DEPAKOTE SPRINKLE) DR capsule 1,000 mg  1,000 mg Oral Q12H [facility] (08/20) Evelin Nava MD   1,000 mg at 04/22/25 1139    enoxaparin ANTICOAGULANT (LOVENOX) injection 40 mg  40 mg Subcutaneous Q12H Troy Farley MD   40 mg at 04/27/25 0855    [Held by provider] furosemide (LASIX) tablet 40 mg  40 mg Oral Daily Armando Kern MD   40 mg at 04/19/25 0902    lisinopril (ZESTRIL) tablet 40 mg  40 mg Oral Daily Troy Farley MD   40 mg at 04/27/25 0854    melatonin tablet 3 mg  3 mg Oral At Bedtime Oleksandr Kong MD   3 mg at 04/26/25 2123    metoprolol succinate ER (TOPROL  XL) 24 hr tablet 50 mg  50 mg Oral Daily Armando Kern MD   50 mg at 04/27/25 0854    piperacillin-tazobactam (ZOSYN) 4.5 g vial to attach to  mL bag  4.5 g Intravenous Q6H Troy Farley MD   4.5 g at 04/27/25 1624    [Held by provider] QUEtiapine ER (SEROquel XR) 24 hr tablet 300 mg  300 mg Oral At Bedtime Troy Farley MD   300 mg at 04/21/25 2121    sennosides (SENOKOT) tablet 1 tablet  1 tablet Oral Daily Armando Kern MD   1 tablet at 04/27/25 0854     PRN Meds:   Current Facility-Administered Medications   Medication Dose Route Frequency Provider Last Rate Last Admin    acetaminophen (TYLENOL) tablet 650 mg  650 mg Oral Q4H PRN Oleksandr Kong MD   650 mg at 04/23/25 2136    Or    acetaminophen (TYLENOL) Suppository 650 mg  650 mg Rectal Q4H PRN Oleksandr Kong MD   650 mg at 04/26/25 2314    carboxymethylcellulose PF (REFRESH PLUS) 0.5 % ophthalmic solution 1 drop  1 drop Both Eyes Q1H PRN Bill Zamarripa APRN CNP        hydrALAZINE (APRESOLINE) injection 10 mg  10 mg Intravenous Q6H PRN Troy Farley MD   10 mg at 04/24/25 1948    lidocaine (LMX4) cream   Topical Q1H PRN Oleksandr Kong MD        lidocaine 1 % 0.1-1 mL  0.1-1 mL Other Q1H PRN Oleksandr Kong MD        No lozenges or gum should be given while patient on BIPAP/AVAPS/AVAPS AE   Does not apply Continuous PRN Bill Zamarripa APRN CNP        nystatin (MYCOSTATIN) suspension 500,000 Units  500,000 Units Swish & Swallow 4x Daily PRN Shantell Keenan MD        [Held by provider] OLANZapine (zyPREXA) injection 5 mg  5 mg Intramuscular Daily PRN Evelin Nava MD   5 mg at 04/18/25 1943    ondansetron (ZOFRAN ODT) ODT tab 4 mg  4 mg Oral Q6H PRN Oleksandr Kong MD        Or    ondansetron (ZOFRAN) injection 4 mg  4 mg Intravenous Q6H PRN Oleksandr Kong MD        Patient may continue current oral medications   Does not apply Continuous PRN Bill Zamarripa APRN CNP        polyethylene glycol (MIRALAX) Packet 17 g  17  g Oral BID PRN Oleksandr Kong MD   17 g at 04/10/25 0906    prochlorperazine (COMPAZINE) injection 5 mg  5 mg Intravenous Q6H PRN Oleksandr Kong MD        Or    prochlorperazine (COMPAZINE) tablet 5 mg  5 mg Oral Q6H PRN Oleksandr Kong MD        [Held by provider] QUEtiapine (SEROquel) half-tab 12.5 mg  12.5 mg Oral TID PRN Troy Farley MD        senna-docusate (SENOKOT-S/PERICOLACE) 8.6-50 MG per tablet 1 tablet  1 tablet Oral BID PRN Oleksandr Kong MD   1 tablet at 04/06/25 1601    Or    senna-docusate (SENOKOT-S/PERICOLACE) 8.6-50 MG per tablet 2 tablet  2 tablet Oral BID PRN Oleksandr Kong MD               Physical Exam:   Vitals: Blood pressure (!) 154/82, pulse 61, temperature 99.6  F (37.6  C), temperature source Axillary, resp. rate 16, SpO2 94%.  General Appearance:    He was in no apparent respiratory  Neuro:       Mental Status Exam:    He was awake and alert and briefly made eye contact, however, his speech was garbled and he was unable to respond to simple questions or gestures, not following commands and occasionally had echolalia in response to questions.       Cranial Nerves:   Pupils were equal and reactive to light, there was no ptosis, he appeared to have conjugate eye movements but was uncooperative for further testing but appeared to respond to visual threats bilaterally, facial asymmetry was not appreciated.           Motor:   There was gegenhalten rigidity of the upper extremities and he resisted movement of the lower extremities, however there were no involuntary movements or dystonic movements or tremors seen.  He moves both upper extremities symmetrically and withdrew both lower extremities symmetrically and vigorously, in response to stimuli.           Reflexes: Patient was uncooperative with testing       Sensory: Could not assess                   Coordination:   Could not assess       Gait: Deferred  Cardiovascular: Regular rate and rhythm, no m/r/g  Lungs: Clear to  auscultation  Abdomen: Soft, not tender, not destended  Extremities: No clubbing, no cyanosis, no edema       Data:   ROUTINE IP LABS (Last 3results)  CBC RESULTS:     Recent Labs   Lab 04/27/25  0917 04/26/25  0759 04/25/25  0618   WBC 13.0* 14.4* 13.1*   RBC 6.30* 6.22* 6.45*   HGB 12.1* 11.8* 12.4*   HCT 38.7* 38.3* 39.3*    279 237     Basic Metabolic Panel:  Recent Labs   Lab 04/27/25  0917 04/26/25  0759 04/25/25  0618    144 140   POTASSIUM 3.7 3.9 3.8   CHLORIDE 109* 111* 108*   CO2 23 21* 22   BUN 13.8 13.5 13.6   CR 0.74 0.77 0.71   * 116* 135*   UDAY 10.9* 10.8* 10.9*     INR:No lab results found in last 7 days.   Lipid Profile:  Recent Labs   Lab Test 10/13/24  0805 02/20/24  0933   CHOL 210* 192   HDL 30* 34*   * 109*   TRIG 362* 244*     TSH:  TSH   Date Value Ref Range Status   04/26/2025 2.37 0.30 - 4.20 uIU/mL Final   ,   Vitamin B12:   Lab Results   Component Value Date    B12 1,109 04/26/2025      A1C:   Lab Results   Component Value Date    A1C 5.9 10/13/2024   Valproic acid level 4/25/2025, 36.9        Assessment and Plan:     #.   -- Santos Aaron is a 65-year-old patient who is being assessed for neurocognitive disorder of unclear etiology, prior mention of Alzheimer's disease, on review of his records, it would appear that he became with psychiatric symptoms warranting admission in November 2024 at that time, it was noted that his decompensation might have been associated with acute, toxic/metabolic encephalopathy related to acute hypoxic hypercarbic respiratory failure, resolved, intermittent fever of unclear etiology, sepsis with elevated lactate, resolved.  Recent admission from a memory care unit because of concerns with agitated delirium, however, during this admission patient became more somnolent and despite this.  Seroquel has been discontinued and now his Depakote is being held.  #.    Today he is not as somnolent and drowsy and was much more alert but was unable  to follow through with simple commands, he did not respond to simple questions but continued with garbled irregular relevant speech but did not seem agitated but could not communicate appropriately.  Still appeared to be significantly encephalopathic in spite of adjustments made with his medications, Seroquel and Depakote.    --Impression   - Acute agitated delirium, due to toxic/metabolic derangement, encephalopathy, multifactorial the etiology, improved, however, following increasing somnolence, which has resolved, he now appears to be confused with garbled speech and unable to follow through with simple commands or gestures.  -Chronic neuropsychiatric disorder of unclear etiology, patient is being followed by psychiatry, however with concerns regarding accelerated symptoms which would warrant further studies doing about her studies for autoimmune paraneoplastic panel, thyroid thyroid peroxidase, thyroglobulin antibody level, repeat vitamin B12 level, vitamin B1 level,  --Routine EEG revealed generalized slowing of the background activity but no epileptiform discharges.  --- Lumbar puncture with anesthesia assist is recommended for CSF studies.  --Repeat brain MRI with contrast.  --Minimize the use of sedating drugs..  -  Neurology will follow along with you and make further recommendations in the future, as indicated.  The assessment and recommendations were discussed with Dr. Miguelito Schmidt M.D.  [facility]  Office [phone]            Alert-The patient is alert, awake and responds to voice. The patient is oriented to time, place, and person. The triage nurse is able to obtain subjective information.

## 2025-04-27 NOTE — PROGRESS NOTES
Date/Time: 4/27/25 [id number]  Diagnosis: Aggressive behaviors at his care facility  Mental Status: NARESH.  Activity/dangle: Ast of 2 Lift- up in chair this shift   Diet: Soft bite size w/ thin liquid, very poor intake  Pain: NARESH, pt comfortable in bed w/ no mourning or crying  Alberto/Voiding: Incontinent B&B- no BM this shift   Tele/Restraints/Iso: N/A  02/LDA: Room air. IV saline locked  D/C Date: TBD

## 2025-04-27 NOTE — PROGRESS NOTES
4/27/2025 1900-0700    Diagnosis: Aggressive behaviors at his care facility                    Acute met encephalopathy, Hosp acquired delirium                    Intermittent fever with leukocytosis, unclear etio  Mental Status: Alert, confused  Activity/dangle: assist of 2-3 with lift for T/R  Diet: Soft bite size w/ thin liquid, very poor intake  Pain: Unable to assess  Alberto/Voiding: Incontinent B&B  Tele/Restraints/Iso: N/A  02/LDA: Room air. IV saline locked  D/C Date:TBD  Other Info: Awake on and off, and attempting to get OOB, uncooperative with T/R. Responds with incoherent sentences when spoken to. Intermittent fever, latest temp 99.4, Prn Tylenol supp given. IVF infusing at 100 ml/hour. IV abx. Cultures pending. Sepsis BPA triggered this shift, lactic acid was 0.7

## 2025-04-28 ENCOUNTER — APPOINTMENT (OUTPATIENT)
Dept: SPEECH THERAPY | Facility: CLINIC | Age: 65
DRG: 056 | End: 2025-04-28
Attending: PSYCHIATRY & NEUROLOGY
Payer: MEDICARE

## 2025-04-28 LAB
ALBUMIN SERPL BCG-MCNC: 2.9 G/DL (ref 3.5–5.2)
ALP SERPL-CCNC: 131 U/L (ref 40–150)
ALT SERPL W P-5'-P-CCNC: 75 U/L (ref 0–70)
ANION GAP SERPL CALCULATED.3IONS-SCNC: 10 MMOL/L (ref 7–15)
AST SERPL W P-5'-P-CCNC: 62 U/L (ref 0–45)
BILIRUB SERPL-MCNC: 0.7 MG/DL
BUN SERPL-MCNC: 15.6 MG/DL (ref 8–23)
CALCIUM SERPL-MCNC: 11.2 MG/DL (ref 8.8–10.4)
CHLORIDE SERPL-SCNC: 108 MMOL/L (ref 98–107)
CREAT SERPL-MCNC: 0.82 MG/DL (ref 0.67–1.17)
EGFRCR SERPLBLD CKD-EPI 2021: >90 ML/MIN/1.73M2
ERYTHROCYTE [DISTWIDTH] IN BLOOD BY AUTOMATED COUNT: 17.2 % (ref 10–15)
GLUCOSE SERPL-MCNC: 98 MG/DL (ref 70–99)
HCO3 SERPL-SCNC: 22 MMOL/L (ref 22–29)
HCT VFR BLD AUTO: 36.3 % (ref 40–53)
HGB BLD-MCNC: 11.4 G/DL (ref 13.3–17.7)
HIV 1+2 AB+HIV1 P24 AG SERPL QL IA: NONREACTIVE
LACTATE SERPL-SCNC: 0.9 MMOL/L (ref 0.7–2)
MCH RBC QN AUTO: 19.3 PG (ref 26.5–33)
MCHC RBC AUTO-ENTMCNC: 31.4 G/DL (ref 31.5–36.5)
MCV RBC AUTO: 61 FL (ref 78–100)
PLATELET # BLD AUTO: 415 10E3/UL (ref 150–450)
POTASSIUM SERPL-SCNC: 3.8 MMOL/L (ref 3.4–5.3)
PROT SERPL-MCNC: 6.4 G/DL (ref 6.4–8.3)
RBC # BLD AUTO: 5.91 10E6/UL (ref 4.4–5.9)
SODIUM SERPL-SCNC: 140 MMOL/L (ref 135–145)
THYROGLOB AB SERPL IA-ACNC: <20 IU/ML
THYROPEROXIDASE AB SERPL-ACNC: 35 IU/ML
WBC # BLD AUTO: 14.3 10E3/UL (ref 4–11)

## 2025-04-28 PROCEDURE — 250N000013 HC RX MED GY IP 250 OP 250 PS 637: Performed by: INTERNAL MEDICINE

## 2025-04-28 PROCEDURE — 250N000011 HC RX IP 250 OP 636: Performed by: STUDENT IN AN ORGANIZED HEALTH CARE EDUCATION/TRAINING PROGRAM

## 2025-04-28 PROCEDURE — 250N000013 HC RX MED GY IP 250 OP 250 PS 637: Performed by: PHYSICIAN ASSISTANT

## 2025-04-28 PROCEDURE — 250N000013 HC RX MED GY IP 250 OP 250 PS 637: Performed by: HOSPITALIST

## 2025-04-28 PROCEDURE — 85018 HEMOGLOBIN: CPT | Performed by: STUDENT IN AN ORGANIZED HEALTH CARE EDUCATION/TRAINING PROGRAM

## 2025-04-28 PROCEDURE — 99232 SBSQ HOSP IP/OBS MODERATE 35: CPT | Performed by: INTERNAL MEDICINE

## 2025-04-28 PROCEDURE — 92526 ORAL FUNCTION THERAPY: CPT | Mod: GN

## 2025-04-28 PROCEDURE — 120N000001 HC R&B MED SURG/OB

## 2025-04-28 PROCEDURE — 99231 SBSQ HOSP IP/OBS SF/LOW 25: CPT | Performed by: PHYSICIAN ASSISTANT

## 2025-04-28 PROCEDURE — 83605 ASSAY OF LACTIC ACID: CPT | Performed by: HOSPITALIST

## 2025-04-28 PROCEDURE — 87389 HIV-1 AG W/HIV-1&-2 AB AG IA: CPT | Performed by: PSYCHIATRY & NEUROLOGY

## 2025-04-28 PROCEDURE — 36415 COLL VENOUS BLD VENIPUNCTURE: CPT | Performed by: PSYCHIATRY & NEUROLOGY

## 2025-04-28 PROCEDURE — 250N000013 HC RX MED GY IP 250 OP 250 PS 637: Performed by: STUDENT IN AN ORGANIZED HEALTH CARE EDUCATION/TRAINING PROGRAM

## 2025-04-28 PROCEDURE — 80053 COMPREHEN METABOLIC PANEL: CPT | Performed by: STUDENT IN AN ORGANIZED HEALTH CARE EDUCATION/TRAINING PROGRAM

## 2025-04-28 RX ORDER — DIVALPROEX SODIUM 500 MG/1
500 TABLET, FILM COATED, EXTENDED RELEASE ORAL AT BEDTIME
Status: DISCONTINUED | OUTPATIENT
Start: 2025-04-28 | End: 2025-05-06

## 2025-04-28 RX ADMIN — AMLODIPINE BESYLATE 10 MG: 10 TABLET ORAL at 08:52

## 2025-04-28 RX ADMIN — METOPROLOL SUCCINATE 50 MG: 50 TABLET, EXTENDED RELEASE ORAL at 08:52

## 2025-04-28 RX ADMIN — PIPERACILLIN AND TAZOBACTAM 4.5 G: 4; .5 INJECTION, POWDER, FOR SOLUTION INTRAVENOUS at 04:15

## 2025-04-28 RX ADMIN — LISINOPRIL 40 MG: 40 TABLET ORAL at 08:52

## 2025-04-28 RX ADMIN — DIVALPROEX SODIUM 500 MG: 500 TABLET, FILM COATED, EXTENDED RELEASE ORAL at 21:09

## 2025-04-28 RX ADMIN — SENNOSIDES 1 TABLET: 8.6 TABLET ORAL at 08:52

## 2025-04-28 RX ADMIN — CINACALCET 60 MG: 30 TABLET ORAL at 08:52

## 2025-04-28 RX ADMIN — ACETAMINOPHEN 650 MG: 325 TABLET, FILM COATED ORAL at 00:50

## 2025-04-28 RX ADMIN — PIPERACILLIN AND TAZOBACTAM 4.5 G: 4; .5 INJECTION, POWDER, FOR SOLUTION INTRAVENOUS at 09:02

## 2025-04-28 RX ADMIN — CLONIDINE HYDROCHLORIDE 0.1 MG: 0.1 TABLET ORAL at 21:09

## 2025-04-28 RX ADMIN — PIPERACILLIN AND TAZOBACTAM 4.5 G: 4; .5 INJECTION, POWDER, FOR SOLUTION INTRAVENOUS at 21:14

## 2025-04-28 RX ADMIN — CLONIDINE HYDROCHLORIDE 0.1 MG: 0.1 TABLET ORAL at 08:52

## 2025-04-28 RX ADMIN — MELATONIN TAB 3 MG 3 MG: 3 TAB at 21:09

## 2025-04-28 RX ADMIN — PIPERACILLIN AND TAZOBACTAM 4.5 G: 4; .5 INJECTION, POWDER, FOR SOLUTION INTRAVENOUS at 16:02

## 2025-04-28 ASSESSMENT — ACTIVITIES OF DAILY LIVING (ADL)
ADLS_ACUITY_SCORE: 84
ADLS_ACUITY_SCORE: 80
ADLS_ACUITY_SCORE: 78
ADLS_ACUITY_SCORE: 80
ADLS_ACUITY_SCORE: 78
ADLS_ACUITY_SCORE: 80
ADLS_ACUITY_SCORE: 84
ADLS_ACUITY_SCORE: 78
ADLS_ACUITY_SCORE: 84
ADLS_ACUITY_SCORE: 80
ADLS_ACUITY_SCORE: 78
ADLS_ACUITY_SCORE: 80
ADLS_ACUITY_SCORE: 82
ADLS_ACUITY_SCORE: 80
ADLS_ACUITY_SCORE: 82
ADLS_ACUITY_SCORE: 82

## 2025-04-28 NOTE — PROGRESS NOTES
[facility]    Hospitalist Progress Note    Interval History   - Patient alert, had difficulty saying his name, cooperative on exam  - Plan LP today, however patient is on Lovenox which is now held since this morning    Assessment & Plan   Summary: Estevan Aaron is a 65 year old male with PMH HTN, HLD, hyperparathyroidism, dementia who was admitted on 4/4/2025 for aggressive behaviors at his care facility.     Acute toxic metabolic encephalopathy, multifactorial, ongoing  Hospital-acquired delirium  Chronic neuropsychiatric disorder w/ behavioral disturbance, unclear etiology  Reported Alzheimer's dementia  History of prolonged inpatient psychiatric hospitalization 10/2024-late 2/2025.   Reportedly aggressive behaviors at [facility]. Calm and cooperative with staff in the ED, although significant behavioral disturbance noted early this admission.  Psychiatry involved for assistance with medication titration, however, developed severe encephalopathy with persistent somnolence.  Mental status showed gradual improvement, although worsening somnolence noted on 4/24/2025 compared to prior days. MRI brain 4/24 without acute changes. Of note, patient carries Alzheimer's diagnosis, although significant history of underlying psychiatric disease and concern for paranoid personality disorder. Patient's ex-wife/guardian describes rather accelerated mental status decline beginning in late 2024. At this time, suspect multifactorial encephalopathy 2/2 delirium, psychoactive medication effects, and undifferentiated neuropsychiatric disorder.    Note elevated CRP, ESR 4/27/2025.  - Appreciate Psychiatry consult   - Reconsult psychiatry 4/28  - Holding seroquel, depakote  - Appreciate Neurology consult  - Follow-up paraneoplastic panel, TPO ab, thyroglobulin ab  - Plan for LP on 4/28  - Hold fluids for now given increase in PO intake  - Delirium precautions  - Neurology, Psychology, SLP  following    Intermittent fever w/ leukocytosis, unclear etiology  Acute hypoxic hypercarbic respiratory failure, resolved  Concern for aspiration PNA vs pneumonitis  Sepsis w/ elevated lactate, resolved  Respiratory acidosis w/ metabolic compensation, resolved  RRT called early morning on 4/20/25 due to somnolence with development of congested cough, hypoxia to 84%, and borderline fever. Briefly required BiPAP and quickly weaned to O2 NC. CXR without opacity, although some concern for acute aspiration event in setting of suspected medication-mediated somnolence as above.  Now weaned off of supplemental oxygen with intermittent fevers and leukocytosis of unclear etiology.  UA noninfectious, no hemodynamic changes, no ongoing respiratory symptoms, COVID/flu negative, CXR stable, pressure injury noted without concern for infection, no other skin changes, and MRI without acute change. LFTs mildly elevated but no apparent pain with deep abdominal palpation. No rigidity on exam and no new medications. Initially considered LP to rule out meningitis, although no nuchal rigidity and mental status now improving. Overall unclear exact etiology of fevers and challenging assessment given underlying dementia.  - O2 goal >88%, currently on RA  - Continue zosyn, plan empiric 10 days ending 4/29   + Follow-up cultures, remain NGTD  - LP as above  - Consider ID consult if ongoing fevers, last on 4/28  - Continue holding furosemide  - Continue holding psychoactive medications    Elevated LFTs, stable  Unclear etiology of mild elevation, no history of liver disease or failure. Possibly in setting of statin vs depakote use. Bili normal and no other culprit medication.  - Continue holding statin and depakote  - Repeat LFT in AM    Hyperparathyroidism w/ hypercalcemia  Ongoing hypercalcemia noted despite cinacalcet administration (held 4/23 to 4/26). Low suspicion that mild hypercalcemia is driving mental status changes. Repeat PTH is  slightly elevated to 73 this admission  - Continue cinacalcet, increased to 60mg daily  - Daily BMP, check ionized calcium     Deep tissue pressure injury, sacrococcygeal area, hospital-acquired - No signs of infection. WOC RN following  Suspected ADAM - No formal sleep study, although high suspicion. Outpatient sleep center referral on discharge  HTN - Continue PTA amlodipine, clonidine, lisinopril, metoprolol. Hydralazine prn for SBP >180  HLD - Holding statin given liver dysfunction  Hypernatremia, mild, resolved    Clinically Significant Risk Factors          # Hyperchloremia: Highest Cl = 109 mmol/L in last 2 days, will monitor as appropriate       # Hypercalcemia: Highest Ca = 11.2 mg/dL in last 2 days, will monitor as appropriate    # Hypoalbuminemia: Lowest albumin = 2.9 g/dL at 4/28/2025  4:51 AM, will monitor as appropriate     # Hypertension: Noted on problem list     # Acute Hypercapnic Respiratory Failure: based on venous blood gas results.  Continue supplemental oxygen and ventilatory support as indicated.  # Dementia: noted on problem list            # Financial/Environmental Concerns: none          PT/OT: ordered--likely back to care facility when improved  Diet: Snacks/Supplements Adult: Ensure Enlive; With Meals  Room Service  Combination Diet Safe Tray - NO utensils; Soft and Bite Sized Diet (level 6); Thin Liquids (level 0)    DVT Prophylaxis: Pneumatic Compression Devices  Alberto Catheter: Not present  Lines: None     Cardiac Monitoring: None  Code Status: No CPR- Do NOT Intubate    Medically Ready for Discharge: Anticipated in 2-4 Days      Jonatan Simental MD  Hospitalist Service  [facility]  Securely message with [employer] (more info)  Text page via [employer] Paging/Directory     - Total time spent on encounter is 40 minutes, which includes counseling, coordination of care, time spent discussing with family, and/or time spent discussing with consultants.    Data reviewed  today: I reviewed all new labs and imaging results over the last 24 hours.    Physical Exam   Temp: 99.2  F (37.3  C) Temp src: Axillary BP: (!) 181/74 Pulse: 65   Resp: 18 SpO2: 93 % O2 Device: None (Room air) Oxygen Delivery: 2 LPM  There were no vitals filed for this visit.  Vital Signs with Ranges  Temp:  [98.5  F (36.9  C)-100.5  F (38.1  C)] 99.2  F (37.3  C)  Pulse:  [61-65] 65  Resp:  [16-18] 18  BP: (146-181)/(72-82) 181/74  SpO2:  [9 %-96 %] 93 %  I/O last 3 completed shifts:  In: 100 [P.O.:100]  Out: 1200 [Urine:1200]  O2 requirements: none    Constitutional: Male in NAD  HEENT: Eyes nonicteric  Cardiovascular: RRR, normal S1/2, no m/r/g  Respiratory: CTAB, no wheezing or crackles  Vascular: No LE pitting edema  GI: Normoactive bowel sounds, nontender  Neuro/Psych: A&O to self only nonsensical speech, moves all extremities    Medications   Current Facility-Administered Medications   Medication Dose Route Frequency Provider Last Rate Last Admin    No lozenges or gum should be given while patient on BIPAP/AVAPS/AVAPS AE   Does not apply Continuous PRN Bill Zamarripa APRN CNP        Patient may continue current oral medications   Does not apply Continuous PRN Bill Zamarripa APRN CNP         Current Facility-Administered Medications   Medication Dose Route Frequency Provider Last Rate Last Admin    amLODIPine (NORVASC) tablet 10 mg  10 mg Oral Daily Troy Farley MD   10 mg at 04/28/25 0852    [Held by provider] atorvastatin (LIPITOR) tablet 40 mg  40 mg Oral Daily Armando Kern MD   40 mg at 04/20/25 2131    cinacalcet (SENSIPAR) tablet 60 mg  60 mg Oral Daily Troy Farley MD   60 mg at 04/28/25 0852    cloNIDine (CATAPRES) tablet 0.1 mg  0.1 mg Oral BID Armando Kern MD   0.1 mg at 04/28/25 0852    [Held by provider] divalproex sodium extended-release (DEPAKOTE ER) 24 hr tablet 1,000 mg  1,000 mg Oral Q12H [facility] (08/20) Evelin Nava MD   1,000 mg at 04/23/25 2121    Or     [Held by provider] divalproex sodium delayed-release (DEPAKOTE SPRINKLE) DR capsule 1,000 mg  1,000 mg Oral Q12H [facility] (08/20) Evelin Nava MD   1,000 mg at 04/22/25 1139    [Held by provider] enoxaparin ANTICOAGULANT (LOVENOX) injection 40 mg  40 mg Subcutaneous Q12H Troy Farley MD   40 mg at 04/27/25 2040    [Held by provider] furosemide (LASIX) tablet 40 mg  40 mg Oral Daily Armando Kern MD   40 mg at 04/19/25 0902    lisinopril (ZESTRIL) tablet 40 mg  40 mg Oral Daily Troy Farley MD   40 mg at 04/28/25 0852    melatonin tablet 3 mg  3 mg Oral At Bedtime Oleksandr Kong MD   3 mg at 04/27/25 2042    metoprolol succinate ER (TOPROL XL) 24 hr tablet 50 mg  50 mg Oral Daily Armando Kern MD   50 mg at 04/28/25 0852    piperacillin-tazobactam (ZOSYN) 4.5 g vial to attach to  mL bag  4.5 g Intravenous Q6H Troy Farley MD   4.5 g at 04/28/25 0902    [Held by provider] QUEtiapine ER (SEROquel XR) 24 hr tablet 300 mg  300 mg Oral At Bedtime Troy Farley MD   300 mg at 04/21/25 2121    sennosides (SENOKOT) tablet 1 tablet  1 tablet Oral Daily Armando Kern MD   1 tablet at 04/28/25 0852       Data   Recent Labs   Lab 04/28/25  0451 04/27/25  0917 04/26/25  0759   WBC 14.3* 13.0* 14.4*   HGB 11.4* 12.1* 11.8*   MCV 61* 61* 62*    362 279    142 144   POTASSIUM 3.8 3.7 3.9   CHLORIDE 108* 109* 111*   CO2 22 23 21*   BUN 15.6 13.8 13.5   CR 0.82 0.74 0.77   ANIONGAP 10 10 12   UDAY 11.2* 10.9* 10.8*   GLC 98 146* 116*   ALBUMIN 2.9* 3.1* 3.0*   PROTTOTAL 6.4 6.6 6.4   BILITOTAL 0.7 0.7 0.8   ALKPHOS 131 121 98   ALT 75* 79* 74*   AST 62* 71* 85*       Imaging:   No results found for this or any previous visit (from the past 24 hours).

## 2025-04-28 NOTE — CONSULTS
Psychiatry Consultation; Follow up              Reason for Consult, requesting source:      Requesting source: Oleksandr Kong    Labs and imaging reviewed,     Total time spent in chart review, patient interview and coordination of care;            Interim history:    On approach today patient is sitting in bedside chair eating breakfast. Reacts well to positive tone. Seems to comprehend writers statements, but is unable to find the words to respond coherently. Remains aphasic, but is far less agitated today than on previous interactions.         Current Medications:     Current Facility-Administered Medications   Medication Dose Route Frequency Provider Last Rate Last Admin    amLODIPine (NORVASC) tablet 10 mg  10 mg Oral Daily Troy Farley MD   10 mg at 04/28/25 0852    [Held by provider] atorvastatin (LIPITOR) tablet 40 mg  40 mg Oral Daily Armando Kern MD   40 mg at 04/20/25 2131    cinacalcet (SENSIPAR) tablet 60 mg  60 mg Oral Daily Troy Farley MD   60 mg at 04/28/25 0852    cloNIDine (CATAPRES) tablet 0.1 mg  0.1 mg Oral BID Armando Kern MD   0.1 mg at 04/28/25 0852    [Held by provider] divalproex sodium extended-release (DEPAKOTE ER) 24 hr tablet 1,000 mg  1,000 mg Oral Q12H [facility] (08/20) Evelin Nava MD   1,000 mg at 04/23/25 2121    Or    [Held by provider] divalproex sodium delayed-release (DEPAKOTE SPRINKLE) DR capsule 1,000 mg  1,000 mg Oral Q12H [facility] (08/20) Evelin Nava MD   1,000 mg at 04/22/25 1139    [Held by provider] enoxaparin ANTICOAGULANT (LOVENOX) injection 40 mg  40 mg Subcutaneous Q12H Troy Farley MD   40 mg at 04/27/25 2040    [Held by provider] furosemide (LASIX) tablet 40 mg  40 mg Oral Daily Armando Kern MD   40 mg at 04/19/25 0902    lisinopril (ZESTRIL) tablet 40 mg  40 mg Oral Daily Troy Farley MD   40 mg at 04/28/25 0852    melatonin tablet 3 mg  3 mg Oral At Bedtime Oleksandr Kong MD   3 mg at 04/27/25 2042     "metoprolol succinate ER (TOPROL XL) 24 hr tablet 50 mg  50 mg Oral Daily Armando Kern MD   50 mg at 04/28/25 0852    piperacillin-tazobactam (ZOSYN) 4.5 g vial to attach to  mL bag  4.5 g Intravenous Q6H Troy Farley MD   4.5 g at 04/28/25 0902    [Held by provider] QUEtiapine ER (SEROquel XR) 24 hr tablet 300 mg  300 mg Oral At Bedtime Troy Farley MD   300 mg at 04/21/25 2121    sennosides (SENOKOT) tablet 1 tablet  1 tablet Oral Daily Armando Kern MD   1 tablet at 04/28/25 0852              MSE:   Appearance: awake, alert  Attitude:  cooperative  Eye Contact:  fair  Mood:  \"fair\"  Affect:  slightly restricted  Speech:  paucity of speech and mumbling  Psychomotor Behavior:  no evidence of tardive dyskinesia, dystonia, or tics  Muscle strength and tone: intact   Thought Process:  goal oriented  Associations:  no loose associations  Thought Content:  no evidence of suicidal ideation or homicidal ideation  Insight:  partial  Judgement:  poor  Oriented to:  Person  Attention Span and Concentration:  limited  Recent and Remote Memory:  limited    Vital signs:  Temp: 99.2  F (37.3  C) Temp src: Axillary BP: (!) 181/74 Pulse: 65   Resp: 18 SpO2: 93 % O2 Device: None (Room air) Oxygen Delivery: 2 LPM      Estimated body mass index is 40.9 kg/m  as calculated from the following:    Height as of 10/10/24: 1.676 m (5' 6\").    Weight as of 2/23/25: 114.9 kg (253 lb 6.4 oz).    Qtc:          DSM-5 Diagnosis:   Major neurocognitive disorder          Assessment:   Patient appears much improved today. He is no longer excessively somnolent. Very poor verbal engagement. Remains aphasic. Mood appears jovial today. Minimally able to respond to questions.   Seroquel and depakote have been held since last week. Will resume depakote at reduced dose today and monitor for effect. Decreased from 2000 mg to 500mg.   Anticipate that behavior problems will continue to occur intermittently given his cognitive " "decline. Will require monitoring to avoid excess sedation, but still optimize depakote dose for behavioral control, however this can occur on an outpatient basis.     Recommend patient be discharged back to his memory care placement once medically cleared for discharge.             Summary of Recommendations:   Resume depaktote at decreased dose, 500mg at bedtime  Continue to hold seroquel  Recommend Discharge back to memory care once medically stable.     \"Much or all of the text in this note was generated through the use of Dragon Dictate voice to text software. Errors in spelling or words which appear to be out of contact are unintentional, may be present due having escaped editing\"           "

## 2025-04-28 NOTE — PROGRESS NOTES
"BRIEF NUTRITION ASSESSMENT    CURRENT DIET AND INTAKE:  Diet:  Soft and bite sized, thin liquids, Room Service, Ensure Enlive BID           Fluctuating intakes are documented, mostly 25-50% intakes in the setting of AMS. Messaged RN about supplement preference - patient has been like the chocolate Ensures. Adjusted supplement order.     ANTHROPOMETRICS:  Height: 5'6\"  Weight: No wt measured this admission.   Unable to calculate BMI  IBW:  64.5 kg  Weight History:   Wt Readings from Last 20 Encounters:   02/23/25 114.9 kg (253 lb 6.4 oz)   02/20/24 104.3 kg (230 lb)   09/22/23 99.3 kg (219 lb)   02/17/23 97.5 kg (215 lb)   01/18/22 96.2 kg (212 lb)   01/04/22 96.7 kg (213 lb 1.6 oz)   04/15/21 93 kg (205 lb)   03/01/21 89.8 kg (198 lb)   01/25/21 89.8 kg (198 lb)   11/03/20 91.2 kg (201 lb)   03/16/20 94.3 kg (208 lb)   03/14/19 93.4 kg (206 lb)   01/05/18 97.5 kg (215 lb)   01/09/17 101.6 kg (224 lb)   02/26/16 102.2 kg (225 lb 3.2 oz)   10/02/15 102.1 kg (225 lb)   07/17/14 104.8 kg (231 lb)   01/31/12 98.9 kg (218 lb)   09/22/08 100.7 kg (222 lb)   07/25/07 99.8 kg (220 lb)       LABS/MEDS/PHYSICAL FINDINGS:  Reviewed   LBM   No PI noted 4/23 - receiving senokot    MALNUTRITION:  Visual Nutrition Focused Physical Assessment (NFPA) not completed. Do not suspect muscle/fat losses.  Unable to fully assess for malnutrition due to lack of recent wt trends.     % Weight Loss:  Unable to fully assess   % Intake:  </= 50% for >/= 5 days (severe malnutrition)  Subcutaneous Fat Loss:  Do not suspect muscle/fat losses.  Muscle Loss:  Do not suspect muscle/fat losses.  Fluid Retention:  1-2+ - does not meet criteria.     NUTRITION INTERVENTION:  Nutrition Diagnosis:  Inadequate oral intake related to altered mental status as evidenced by intakes of 25-50% of meals for up to 2 weeks.     Implementation:  Nutrition Education:  Not appropriate at this time due to patient condition  Medical Food Supplement: Continue Ensure BID w/ " meals (prefers chocolate)    FOLLOW UP/MONITORING:   Will re-evaluate in 7 - 10 days, or sooner, if re-consulted.    Lacey Salter MS, RD, LD  Pager: [phone]  Available on [employer]

## 2025-04-28 NOTE — PROGRESS NOTES
Care Management Follow Up    Length of Stay (days): 21    Expected Discharge Date: 04/30/2025     Concerns to be Addressed: discharge planning     Patient plan of care discussed at interdisciplinary rounds: Yes    Anticipated Discharge Disposition:                Anticipated Discharge Services:    Anticipated Discharge DME:      Patient/family educated on Medicare website which has current facility and service quality ratings:    Education Provided on the Discharge Plan:    Patient/Family in Agreement with the Plan: yes    Referrals Placed by CM/SW:    Private pay costs discussed: Not applicable    Discussed  Partnership in Safe Discharge Planning  document with patient/family: No     Handoff Completed: No, handoff not indicated or clinically appropriate    Additional Information:  Email forwarded from previous DEBORAH Gilbert that was sent by Lonnie Ryan and Jason Gamez from [place]. Writer responded to the request for an update and that estimated discharge is noted to be in 2 days. Writer asked them to reach out with any additional questions.     Next Steps: will follow for discharge     ZOFIA Myers

## 2025-04-28 NOTE — PLAN OF CARE
Date/Time: 4/28/25 0700 - 1900  Diagnosis: Aggressive behaviors at his care facility  Mental Status: NARESH.  Activity/dangle: Ast of 2 Lift  Diet: Soft bite size w/ thin liquid, very poor intake  Pain: NARESH, pt comfortable in bed w/ no mourning or crying  Alberto/Voiding: Incontinent B&B  Tele/Restraints/Iso: N/A  02/LDA: Room air. IV saline locked  D/C Date: TBD  Others: Coccyx wound dressing changed PRN. Spinal tap on 4/29/25 at 1100.

## 2025-04-28 NOTE — PROGRESS NOTES
4/27/2025 [id number]    DX:Acute toxic metabolic encephalopathy, multifactorial, ongoing  Hospital-acquired delirium  Chronic neuropsychiatric disorder w/ behavioral disturbance, unclear etiology  Reported Alzheimer's dementia  Intermittent fever with leukocytosis, unclear etio  Mental Status:Disoriented x 4  Activity/dangle: Lift, q2 turn and repo  Diet:Soft and bite size, thin liquids, feeder   Pain:No prns  Alberto/Voiding:Incontinent, external cath   02/LDA:PIV saline locked x 2, purewick  D/C Date:TBD  Other Info: Fever 101, PRN Tylenol given. Sepsis BPA triggered, lactic acid was 0.9. Plan for lumbar tap.

## 2025-04-29 ENCOUNTER — APPOINTMENT (OUTPATIENT)
Dept: GENERAL RADIOLOGY | Facility: CLINIC | Age: 65
End: 2025-04-29
Attending: PSYCHIATRY & NEUROLOGY
Payer: MEDICARE

## 2025-04-29 ENCOUNTER — APPOINTMENT (OUTPATIENT)
Dept: SPEECH THERAPY | Facility: CLINIC | Age: 65
DRG: 056 | End: 2025-04-29
Attending: PSYCHIATRY & NEUROLOGY
Payer: MEDICARE

## 2025-04-29 LAB
APPEARANCE CSF: CLEAR
C GATTII+NEOFOR DNA CSF QL NAA+NON-PROBE: NEGATIVE
CA-I BLD-MCNC: 6 MG/DL (ref 4.4–5.2)
CMV DNA CSF QL NAA+NON-PROBE: NEGATIVE
COLOR CSF: COLORLESS
CREAT SERPL-MCNC: 0.88 MG/DL (ref 0.67–1.17)
CRP SERPL-MCNC: 21.7 MG/L
CRYPTOC AG SPEC QL: NEGATIVE
E COLI K1 AG CSF QL: NEGATIVE
EGFRCR SERPLBLD CKD-EPI 2021: >90 ML/MIN/1.73M2
EV RNA SPEC QL NAA+PROBE: NEGATIVE
GLUCOSE CSF-MCNC: 69 MG/DL (ref 40–70)
GP B STREP DNA CSF QL NAA+NON-PROBE: NEGATIVE
HAEM INFLU DNA CSF QL NAA+NON-PROBE: NEGATIVE
HHV6 DNA CSF QL NAA+NON-PROBE: NEGATIVE
HSV1 DNA CSF QL NAA+NON-PROBE: NEGATIVE
HSV2 DNA CSF QL NAA+NON-PROBE: NEGATIVE
L MONOCYTOG DNA CSF QL NAA+NON-PROBE: NEGATIVE
LAB ORDER RESULT STATUS: NORMAL
Lab: NORMAL
N MEN DNA CSF QL NAA+NON-PROBE: NEGATIVE
PARECHOVIRUS A RNA CSF QL NAA+NON-PROBE: NEGATIVE
PERFORMING LABORATORY: NORMAL
PLATELET # BLD AUTO: 410 10E3/UL (ref 150–450)
PROT CSF-MCNC: 34 MG/DL (ref 15–45)
RBC # CSF MANUAL: 1 /UL (ref 0–2)
S PNEUM DNA CSF QL NAA+NON-PROBE: NEGATIVE
SPECIMEN TYPE: NORMAL
TEST NAME: NORMAL
THYROGLOB SERPL-MCNC: 17 NG/ML
TUBE # CSF: 4
VZV DNA CSF QL NAA+NON-PROBE: NEGATIVE
WBC # CSF MANUAL: 1 /UL (ref 0–5)

## 2025-04-29 PROCEDURE — 92526 ORAL FUNCTION THERAPY: CPT | Mod: GN

## 2025-04-29 PROCEDURE — 82330 ASSAY OF CALCIUM: CPT | Performed by: INTERNAL MEDICINE

## 2025-04-29 PROCEDURE — 82565 ASSAY OF CREATININE: CPT | Performed by: HOSPITALIST

## 2025-04-29 PROCEDURE — 86592 SYPHILIS TEST NON-TREP QUAL: CPT | Performed by: PSYCHIATRY & NEUROLOGY

## 2025-04-29 PROCEDURE — 250N000013 HC RX MED GY IP 250 OP 250 PS 637: Performed by: STUDENT IN AN ORGANIZED HEALTH CARE EDUCATION/TRAINING PROGRAM

## 2025-04-29 PROCEDURE — 89050 BODY FLUID CELL COUNT: CPT | Performed by: PSYCHIATRY & NEUROLOGY

## 2025-04-29 PROCEDURE — 36415 COLL VENOUS BLD VENIPUNCTURE: CPT | Performed by: PSYCHIATRY & NEUROLOGY

## 2025-04-29 PROCEDURE — 87529 HSV DNA AMP PROBE: CPT | Performed by: PSYCHIATRY & NEUROLOGY

## 2025-04-29 PROCEDURE — 250N000013 HC RX MED GY IP 250 OP 250 PS 637: Performed by: INTERNAL MEDICINE

## 2025-04-29 PROCEDURE — 87449 NOS EACH ORGANISM AG IA: CPT | Performed by: PSYCHIATRY & NEUROLOGY

## 2025-04-29 PROCEDURE — 62328 DX LMBR SPI PNXR W/FLUOR/CT: CPT

## 2025-04-29 PROCEDURE — 82234 BETA-AMYLOID 1-42 (ABETA 42): CPT | Performed by: PSYCHIATRY & NEUROLOGY

## 2025-04-29 PROCEDURE — 86255 FLUORESCENT ANTIBODY SCREEN: CPT | Performed by: PSYCHIATRY & NEUROLOGY

## 2025-04-29 PROCEDURE — 99232 SBSQ HOSP IP/OBS MODERATE 35: CPT | Performed by: INTERNAL MEDICINE

## 2025-04-29 PROCEDURE — 36415 COLL VENOUS BLD VENIPUNCTURE: CPT | Performed by: INTERNAL MEDICINE

## 2025-04-29 PROCEDURE — 88108 CYTOPATH CONCENTRATE TECH: CPT | Mod: TC | Performed by: PSYCHIATRY & NEUROLOGY

## 2025-04-29 PROCEDURE — 87102 FUNGUS ISOLATION CULTURE: CPT | Performed by: PSYCHIATRY & NEUROLOGY

## 2025-04-29 PROCEDURE — 87205 SMEAR GRAM STAIN: CPT | Performed by: PSYCHIATRY & NEUROLOGY

## 2025-04-29 PROCEDURE — 82042 OTHER SOURCE ALBUMIN QUAN EA: CPT | Performed by: PSYCHIATRY & NEUROLOGY

## 2025-04-29 PROCEDURE — 87070 CULTURE OTHR SPECIMN AEROBIC: CPT | Performed by: PSYCHIATRY & NEUROLOGY

## 2025-04-29 PROCEDURE — 009U3ZX DRAINAGE OF SPINAL CANAL, PERCUTANEOUS APPROACH, DIAGNOSTIC: ICD-10-PCS | Performed by: PHYSICIAN ASSISTANT

## 2025-04-29 PROCEDURE — 250N000009 HC RX 250: Performed by: PSYCHIATRY & NEUROLOGY

## 2025-04-29 PROCEDURE — 86140 C-REACTIVE PROTEIN: CPT | Performed by: INTERNAL MEDICINE

## 2025-04-29 PROCEDURE — 250N000013 HC RX MED GY IP 250 OP 250 PS 637: Performed by: PHYSICIAN ASSISTANT

## 2025-04-29 PROCEDURE — 82232 ASSAY OF BETA-2 PROTEIN: CPT | Performed by: PSYCHIATRY & NEUROLOGY

## 2025-04-29 PROCEDURE — 84157 ASSAY OF PROTEIN OTHER: CPT | Performed by: PSYCHIATRY & NEUROLOGY

## 2025-04-29 PROCEDURE — 87899 AGENT NOS ASSAY W/OPTIC: CPT | Performed by: PSYCHIATRY & NEUROLOGY

## 2025-04-29 PROCEDURE — 120N000001 HC R&B MED SURG/OB

## 2025-04-29 PROCEDURE — 250N000011 HC RX IP 250 OP 636: Performed by: STUDENT IN AN ORGANIZED HEALTH CARE EDUCATION/TRAINING PROGRAM

## 2025-04-29 PROCEDURE — 87483 CNS DNA AMP PROBE TYPE 12-25: CPT | Performed by: PSYCHIATRY & NEUROLOGY

## 2025-04-29 PROCEDURE — 250N000013 HC RX MED GY IP 250 OP 250 PS 637: Performed by: HOSPITALIST

## 2025-04-29 PROCEDURE — 85049 AUTOMATED PLATELET COUNT: CPT | Performed by: HOSPITALIST

## 2025-04-29 PROCEDURE — 86635 COCCIDIOIDES ANTIBODY: CPT | Performed by: PSYCHIATRY & NEUROLOGY

## 2025-04-29 PROCEDURE — 82945 GLUCOSE OTHER FLUID: CPT | Performed by: PSYCHIATRY & NEUROLOGY

## 2025-04-29 PROCEDURE — 250N000011 HC RX IP 250 OP 636: Performed by: INTERNAL MEDICINE

## 2025-04-29 RX ORDER — FUROSEMIDE 40 MG/1
40 TABLET ORAL DAILY
Status: DISCONTINUED | OUTPATIENT
Start: 2025-04-29 | End: 2025-04-30

## 2025-04-29 RX ORDER — LORAZEPAM 2 MG/ML
1 INJECTION INTRAMUSCULAR ONCE
Status: DISCONTINUED | OUTPATIENT
Start: 2025-04-29 | End: 2025-05-04

## 2025-04-29 RX ORDER — LIDOCAINE HYDROCHLORIDE 10 MG/ML
30 INJECTION, SOLUTION EPIDURAL; INFILTRATION; INTRACAUDAL; PERINEURAL ONCE
Status: DISCONTINUED | OUTPATIENT
Start: 2025-04-29 | End: 2025-04-29

## 2025-04-29 RX ORDER — LORAZEPAM 2 MG/ML
1 INJECTION INTRAMUSCULAR ONCE
Status: COMPLETED | OUTPATIENT
Start: 2025-04-29 | End: 2025-04-30

## 2025-04-29 RX ORDER — PIPERACILLIN SODIUM, TAZOBACTAM SODIUM 4; .5 G/20ML; G/20ML
4.5 INJECTION, POWDER, LYOPHILIZED, FOR SOLUTION INTRAVENOUS EVERY 6 HOURS
Status: COMPLETED | OUTPATIENT
Start: 2025-04-29 | End: 2025-04-29

## 2025-04-29 RX ADMIN — CLONIDINE HYDROCHLORIDE 0.1 MG: 0.1 TABLET ORAL at 22:57

## 2025-04-29 RX ADMIN — PIPERACILLIN AND TAZOBACTAM 4.5 G: 4; .5 INJECTION, POWDER, FOR SOLUTION INTRAVENOUS at 09:15

## 2025-04-29 RX ADMIN — METOPROLOL SUCCINATE 50 MG: 50 TABLET, EXTENDED RELEASE ORAL at 09:15

## 2025-04-29 RX ADMIN — CINACALCET 60 MG: 30 TABLET ORAL at 09:15

## 2025-04-29 RX ADMIN — SENNOSIDES 1 TABLET: 8.6 TABLET ORAL at 09:15

## 2025-04-29 RX ADMIN — MELATONIN TAB 3 MG 3 MG: 3 TAB at 22:52

## 2025-04-29 RX ADMIN — PIPERACILLIN AND TAZOBACTAM 4.5 G: 4; .5 INJECTION, POWDER, FOR SOLUTION INTRAVENOUS at 03:27

## 2025-04-29 RX ADMIN — LIDOCAINE HYDROCHLORIDE 4 ML: 10 INJECTION, SOLUTION EPIDURAL; INFILTRATION; INTRACAUDAL; PERINEURAL at 11:29

## 2025-04-29 RX ADMIN — FUROSEMIDE 40 MG: 40 TABLET ORAL at 14:24

## 2025-04-29 RX ADMIN — AMLODIPINE BESYLATE 10 MG: 10 TABLET ORAL at 09:15

## 2025-04-29 RX ADMIN — CLONIDINE HYDROCHLORIDE 0.1 MG: 0.1 TABLET ORAL at 09:16

## 2025-04-29 RX ADMIN — DIVALPROEX SODIUM 500 MG: 500 TABLET, FILM COATED, EXTENDED RELEASE ORAL at 22:52

## 2025-04-29 RX ADMIN — PIPERACILLIN AND TAZOBACTAM 4.5 G: 4; .5 INJECTION, POWDER, FOR SOLUTION INTRAVENOUS at 16:11

## 2025-04-29 RX ADMIN — LISINOPRIL 40 MG: 40 TABLET ORAL at 09:15

## 2025-04-29 ASSESSMENT — ACTIVITIES OF DAILY LIVING (ADL)
ADLS_ACUITY_SCORE: 74
ADLS_ACUITY_SCORE: 82
ADLS_ACUITY_SCORE: 82
ADLS_ACUITY_SCORE: 74
ADLS_ACUITY_SCORE: 74
ADLS_ACUITY_SCORE: 82
ADLS_ACUITY_SCORE: 74
ADLS_ACUITY_SCORE: 74
ADLS_ACUITY_SCORE: 82
ADLS_ACUITY_SCORE: 74

## 2025-04-29 NOTE — PROGRESS NOTES
Neurology follow-up: 04/29/25    Patient admitted with altered mental status.  He is 65-years-old with a neuropsychiatric order with behavioral disturbance of unclear etiology, possible Alzheimer's dementia.  Neurology had been consulted for his encephalopathy.    Dr. Schmidt last saw him on 4/27/2025, per the note he has a history of hyperlipidemia, hypertension, hyperparathyroidism, thalassemia, and tobacco use is not followed by psychiatry for anxiety disorder.  He has been residing in a memory care unit since his fall.  He did psychiatric admission in November 2020 for for GEORGINA and the concerns for psychosis in the context of hypocalcemia, psychosocial stressors, and hypothyroidism.  There is mention of possible paranoid personality disorder worsened following the COVID pandemic.  There has been report of possible Alzheimer's disease though this is not confirmed.  MRI brain was repeated this admission shows mild to moderate generalized cerebral atrophy with scattered nonspecific T2/FLAIR hyperintensities most consistent with mild chronic microvascular ischemic changes.  Patient had his Seroquel discontinued and his Depakote held due to somnolence.  When last seen by Dr. Schmidt he was not as somnolent and drowsy and much more alert but was unable to follow simple commands.  EEG revealed generalized slowing of the background but no epileptiform activities.  MRI brain with contrast as well as LP was recommended for further workup.    Interval history and investigations     MRI brain wo con- 4/24/25-min moderate generalized neurology, mild chronic microvascular ischemic changes, no masses or bleeds noted.  Motion degraded    Labs: HIV nonreactive, hyperparathyroid hormone 73, ESR 75, CRP 62, lactic acid 0.7, TGO antibody negative, TPO 35 (cut off 35), vitamin B12 1109, white count has been trending up at 14-13,000 every day.    EEG 4/26/2025: Generalized diffuse slowing but no epileptiform discharges or  "seizures.    LP done today: Cryptococcus negative, 1 WBC, RBC 1, protein 34, glucose 69.  Pending cytology, fungal culture, bacterial culture, HSV, Blastomyces antigen, beta-2 microglobulin, meningitis/encephalitis panel, VDRL, oligoclonal bands, autoimmune encephalopathy, Alzheimer's disease evaluation    Depakote was added on recently due to an improvement.    Examination   Neuro:       Mental Status Exam:    He was awake and alert and briefly made eye contact, however, his speech was garbled and he was unable to respond to simple questions, not following commands.  Occasionally answered questions with \"no\" appropriately.       Cranial Nerves:   Pupils were equal and reactive to light, there was no ptosis, he appeared to have conjugate eye movements.  Tracks examiner but does not follow finger.  Some saccadic eye movements noted with tracking.  Blinks to threats bilaterally, facial asymmetry was not appreciated.           Motor:   Moves his right upper extremity antigravity as well as spontaneously, left upper extremity he is able to keep up when lifted up.  No tremors, he had difficulty performing finger tapping for bradykinesia testing.  Paratonia in the right upper extremity, no significant increased tone of the left.  Of the upper extremities and he resisted movement of the lower extremities, however there were no involuntary movements or dystonic movements or tremors seen.  He moves both upper extremities symmetrically and withdrew both lower extremities symmetrically and vigorously, in response to stimuli.           Reflexes: Brisk reflexes of the upper extremities and lower extremities, possible upgoing toes bilaterally       Sensory: Voice pain with pinch in the upper and lower extremities             Coordination:   Could not assess       Gait: Deferred    ASSESSMENT     Encephalopathy, possible neurocognitive disorder.  Differential is broad including a neurodegenerative disorder, infectious, autoimmune, " psychiatric.  So far LP testing has been unremarkable without any leukocytosis or elevated protein be concern for infectious or inflammatory concerns at this time.  EEG negative for seizures.  MRI brain with motion artifact but no significant mass or bleed noted.  Will want to repeat this MRI brain with contrast along with cervical spine given his upgoing toes bilaterally and hyperreflexia in his arms and legs.  Other lab testing pending and will take a few days to a few weeks to result.       RECOMMENDATIONS   Further LP testing pending and will take a few days to weeks to result, recommend neurology follow-up in the outpatient setting for follow-up of these labs along with diagnosis and treatment  Neuropsych testing in the outpatient setting  MRI brain and cervical spine ordered with and without contrast      Continue to provide reorientation, reassurance, and stimulation during the day with lights on, blinds open, and the television on alternating with dark, quiet environment at night.  Interruptions during the night should be limited as much as possible.    Laura Arriola MD   Neurologist, [facility] of Neurology   April 29, 2025 8:20 AM      A total time of 40 minutes was spent on the care of this patient on the date of the visit, outside of time spent performing any procedures. Please excuse any typographical errors, as this note was generated using a Voice Recognition Software.

## 2025-04-29 NOTE — PROGRESS NOTES
[facility]    Hospitalist Progress Note    Interval History   - Patient alert, ongoing nonsensical speech, does not follow cues or commands clearly  - LP planned for 11AM  - Per Psych mental status is improved  - Anticipate discharge back to TCU tomororrow    Assessment & Plan   Summary: Estevan Aaron is a 65 year old male with PMH HTN, HLD, hyperparathyroidism, dementia who was admitted on 4/4/2025 for aggressive behaviors at his care facility.     Acute toxic metabolic encephalopathy, multifactorial, improved  Hospital-acquired delirium  Chronic neuropsychiatric disorder w/ behavioral disturbance, unclear etiology  Reported Alzheimer's dementia  History of prolonged inpatient psychiatric hospitalization 10/2024-late 2/2025.   Reportedly aggressive behaviors at [facility]. Calm and cooperative with staff in the ED, although significant behavioral disturbance noted early this admission.  Psychiatry involved for assistance with medication titration, however, developed severe encephalopathy with persistent somnolence.  Mental status showed gradual improvement, although worsening somnolence noted on 4/24/2025 compared to prior days. MRI brain 4/24 without acute changes. Of note, patient carries Alzheimer's diagnosis, although significant history of underlying psychiatric disease and concern for paranoid personality disorder. Patient's ex-wife/guardian describes rather accelerated mental status decline beginning in late 2024. At this time, suspect multifactorial encephalopathy 2/2 delirium, psychoactive medication effects, and undifferentiated neuropsychiatric disorder.    Note elevated CRP, ESR 4/27/2025.  - Appreciate Psychiatry consult   - Reconsult psychiatry 4/28 appreciated  - Holding seroquel  - Resumed depakote  - Appreciate Neurology consult  - Follow-up paraneoplastic panel, TPO ab, thyroglobulin ab  - Plan for LP on 4/28  - Delirium precautions  - Neurology, Psychology, SLP  following    Intermittent fever w/ leukocytosis, unclear etiology  Acute hypoxic hypercarbic respiratory failure, resolved  Concern for aspiration PNA vs pneumonitis  Sepsis w/ elevated lactate, resolved  Respiratory acidosis w/ metabolic compensation, resolved  RRT called early morning on 4/20/25 due to somnolence with development of congested cough, hypoxia to 84%, and borderline fever. Briefly required BiPAP and quickly weaned to O2 NC. CXR without opacity, although some concern for acute aspiration event in setting of suspected medication-mediated somnolence as above.  Now weaned off of supplemental oxygen with intermittent fevers and leukocytosis of unclear etiology.  UA noninfectious, no hemodynamic changes, no ongoing respiratory symptoms, COVID/flu negative, CXR stable, pressure injury noted without concern for infection, no other skin changes, and MRI without acute change. LFTs mildly elevated but no apparent pain with deep abdominal palpation. No rigidity on exam and no new medications. Initially considered LP to rule out meningitis, although no nuchal rigidity and mental status now improving. Overall unclear exact etiology of fevers and challenging assessment given underlying dementia.  - O2 goal >88%, currently on RA  - Continue zosyn, plan empiric 10 days ending 4/29--last dose this afternoon   + Follow-up cultures, remain NGTD  - LP as above  - Consider ID consult if ongoing fevers, last on 4/28  - Resume lasix 4/29  - Continue holding psychoactive medications    Elevated LFTs, stable  Unclear etiology of mild elevation, no history of liver disease or failure. Possibly in setting of statin vs depakote use. Bili normal and no other culprit medication.  - Continue holding statin and depakote  - Repeat LFT in AM    Hyperparathyroidism w/ hypercalcemia  Ongoing hypercalcemia noted despite cinacalcet administration (held 4/23 to 4/26). Low suspicion that mild hypercalcemia is driving mental status changes.  Repeat PTH is slightly elevated to 73 this admission. Ionized calcium is elevated  - Continue cinacalcet, increased to 60mg daily  - Resume lasix 4/29  - Daily BMP     Deep tissue pressure injury, sacrococcygeal area, hospital-acquired - No signs of infection. WOC RN following  Suspected ADAM - No formal sleep study, although high suspicion. Outpatient sleep center referral on discharge  HTN - Continue PTA amlodipine, clonidine, lisinopril, metoprolol. Hydralazine prn for SBP >180  HLD - Holding statin given liver dysfunction  Hypernatremia, mild, resolved    Clinically Significant Risk Factors          # Hyperchloremia: Highest Cl = 108 mmol/L in last 2 days, will monitor as appropriate       # Hypercalcemia: Highest Ca = 11.2 mg/dL in last 2 days, will monitor as appropriate    # Hypoalbuminemia: Lowest albumin = 2.9 g/dL at 4/28/2025  4:51 AM, will monitor as appropriate     # Hypertension: Noted on problem list     # Acute Hypercapnic Respiratory Failure: based on venous blood gas results.  Continue supplemental oxygen and ventilatory support as indicated.    # Dementia: noted on problem list            # Financial/Environmental Concerns: none          PT/OT: ordered--likely back to care facility when improved  Diet: Snacks/Supplements Adult: Ensure Enlive; With Meals  Room Service  Combination Diet Safe Tray - NO utensils; Soft and Bite Sized Diet (level 6); Thin Liquids (level 0)    DVT Prophylaxis: Pneumatic Compression Devices  Alberto Catheter: Not present  Lines: None     Cardiac Monitoring: None  Code Status: No CPR- Do NOT Intubate    Medically Ready for Discharge: Anticipated Tomorrow to LTC pending LP, calcium levels      Jonatan Simental MD  Hospitalist Service  [facility]  Securely message with [employer] (more info)  Text page via [email or web address] Paging/Directory     Data reviewed today: I reviewed all new labs and imaging results over the last 24 hours.    Physical Exam   Temp: 98.1  F  (36.7  C) Temp src: Axillary BP: (!) 150/82 Pulse: 63   Resp: 18 SpO2: 92 % O2 Device: None (Room air)    There were no vitals filed for this visit.  Vital Signs with Ranges  Temp:  [97.8  F (36.6  C)-99.3  F (37.4  C)] 98.1  F (36.7  C)  Pulse:  [55-67] 63  Resp:  [18-20] 18  BP: (130-160)/() 150/82  SpO2:  [91 %-95 %] 92 %  I/O last 3 completed shifts:  In: 120 [P.O.:120]  Out: 600 [Urine:600]  O2 requirements: none    Constitutional: Male in NAD  HEENT: Eyes nonicteric  Cardiovascular: RRR, normal S1/2, no m/r/g  Respiratory: CTAB, no wheezing or crackles  Vascular: No LE pitting edema  GI: Normoactive bowel sounds, nontender  Neuro/Psych: A&O to self only nonsensical speech, moves all extremities    Medications   Current Facility-Administered Medications   Medication Dose Route Frequency Provider Last Rate Last Admin    No lozenges or gum should be given while patient on BIPAP/AVAPS/AVAPS AE   Does not apply Continuous PRN Bill Zamarripa APRN CNP        Patient may continue current oral medications   Does not apply Continuous PRN Bill Zamarripa APRN CNP         Current Facility-Administered Medications   Medication Dose Route Frequency Provider Last Rate Last Admin    amLODIPine (NORVASC) tablet 10 mg  10 mg Oral Daily Troy Farley MD   10 mg at 04/29/25 0915    [Held by provider] atorvastatin (LIPITOR) tablet 40 mg  40 mg Oral Daily Armando Kern MD   40 mg at 04/20/25 2131    cinacalcet (SENSIPAR) tablet 60 mg  60 mg Oral Daily Troy Farley MD   60 mg at 04/29/25 0915    cloNIDine (CATAPRES) tablet 0.1 mg  0.1 mg Oral BID Armando Kern MD   0.1 mg at 04/29/25 0916    divalproex sodium extended-release (DEPAKOTE ER) 24 hr tablet 500 mg  500 mg Oral At Bedtime Eduardo Reza PA-C   500 mg at 04/28/25 2109    [Held by provider] enoxaparin ANTICOAGULANT (LOVENOX) injection 40 mg  40 mg Subcutaneous Q12H Troy Farley MD   40 mg at 04/27/25 2040    [Held by provider] furosemide  (LASIX) tablet 40 mg  40 mg Oral Daily Armando Kern MD   40 mg at 04/19/25 0902    lisinopril (ZESTRIL) tablet 40 mg  40 mg Oral Daily Troy Farley MD   40 mg at 04/29/25 0915    melatonin tablet 3 mg  3 mg Oral At Bedtime Oleksandr Kong MD   3 mg at 04/28/25 2109    metoprolol succinate ER (TOPROL XL) 24 hr tablet 50 mg  50 mg Oral Daily Armando Kern MD   50 mg at 04/29/25 0915    piperacillin-tazobactam (ZOSYN) 4.5 g vial to attach to  mL bag  4.5 g Intravenous Q6H Troy Farley MD   4.5 g at 04/29/25 0915    [Held by provider] QUEtiapine ER (SEROquel XR) 24 hr tablet 300 mg  300 mg Oral At Bedtime Troy Farley MD   300 mg at 04/21/25 2121    sennosides (SENOKOT) tablet 1 tablet  1 tablet Oral Daily Armando Kern MD   1 tablet at 04/29/25 0915       Data   Recent Labs   Lab 04/29/25  0910 04/28/25  0451 04/27/25  0917 04/26/25  0759   WBC  --  14.3* 13.0* 14.4*   HGB  --  11.4* 12.1* 11.8*   MCV  --  61* 61* 62*    415 362 279   NA  --  140 142 144   POTASSIUM  --  3.8 3.7 3.9   CHLORIDE  --  108* 109* 111*   CO2  --  22 23 21*   BUN  --  15.6 13.8 13.5   CR 0.88 0.82 0.74 0.77   ANIONGAP  --  10 10 12   UDAY  --  11.2* 10.9* 10.8*   GLC  --  98 146* 116*   ALBUMIN  --  2.9* 3.1* 3.0*   PROTTOTAL  --  6.4 6.6 6.4   BILITOTAL  --  0.7 0.7 0.8   ALKPHOS  --  131 121 98   ALT  --  75* 79* 74*   AST  --  62* 71* 85*       Imaging:   No results found for this or any previous visit (from the past 24 hours).

## 2025-04-29 NOTE — PROGRESS NOTES
RADIOLOGY PROCEDURE NOTE  Patient name: Estevan Aaron  MRN: [id number]  : 1960    Pre-procedure diagnosis: Confusion  Post-procedure diagnosis: Same    Procedure Date/Time: 2025  12:51 PM  Procedure: LP   Estimated blood loss: None  Specimen(s) collected with description: 18 mL of clear CSF  The patient tolerated the procedure well with no immediate complications.  Significant findings: 2.5 mL for orozco test obtained with direct drip.    See imaging dictation for procedural details.    Provider name: Mulugeta Orlando PA-C  Assistant(s):None

## 2025-04-29 NOTE — PROGRESS NOTES
Care Management Follow Up    Length of Stay (days): 22    Expected Discharge Date: 04/30/2025     Concerns to be Addressed: discharge planning     Patient plan of care discussed at interdisciplinary rounds: Yes    Anticipated Discharge Disposition:                Anticipated Discharge Services:    Anticipated Discharge DME:      Patient/family educated on Medicare website which has current facility and service quality ratings:    Education Provided on the Discharge Plan:    Patient/Family in Agreement with the Plan: yes    Referrals Placed by CM/SW:    Private pay costs discussed: Not applicable    Discussed  Partnership in Safe Discharge Planning  document with patient/family: No     Handoff Completed: No, handoff not indicated or clinically appropriate    Additional Information:  Email sent back to Jeffy regarding patient. Notes indicate he may be ready for discharge pending labs so writer wanted to clarify regarding patient being re-evaluated to return to [facility]. Cc'd CC Rayna.    Vera responded to email:   We will need to reassess again based on condition changes.  We have an new admit tomorrow, and a Thursday re- admit would be preferred.   I need to confirm my nurses schedule and if she would like to come to do an in person assessment.     Can you please fax a discharge packet today or early AM for nurse review?    Writer responded and noted that information can be sent today and asked for desired fax number.    1500: Requested information faxed to [phone].    Next Steps: follow for discharge planning.    ZOFIA Myers

## 2025-04-29 NOTE — PLAN OF CARE
Goal Outcome Evaluation:                      Date/Time: 04/29 1900-0730  Diagnosis: Aggressive behaviors  Mental Status: NARESH  Activity/dangle: Up with 3/Lift, t/repo  Diet: Soft/Bite size in soft tray, thin liquid  Pain: Doesn't appear in pain  Alberto/Voiding: Incontinent of bladder. Has external catheter. No BM this shift.  Tele/Restraints/Iso: None  D/C Date: TBD

## 2025-04-29 NOTE — PROGRESS NOTES
.Date/Time4/29/25 [id number]  Diagnosis:Aggressive behaviors at his care facility  Mental Status:NARESH  Activity/dangle: A2& lift   Diet:Soft bite size w/thin liquid   Pain:NARESH  Alberto/Voiding:incontinent B&B   Tele/Restraints/Iso:NA  02/LDA:RAYOSEF intromittent Abx  D/C Date:pending   Other Info: VSS on Ra. T&R. Coccyx wound open to air.

## 2025-04-29 NOTE — PLAN OF CARE
Date/Time: 4/29/25 0700 - 1500  Diagnosis: Aggressive behaviors at his care facility  Mental Status: NARESH.  Activity/dangle: Ast of 2 Lift  Diet: Soft bite size w/ thin liquid, very poor intake  Pain: NARESH, pt comfortable in bed w/ no mourning or crying  Alberto/Voiding: Incontinent B&B  Tele/Restraints/Iso: N/A  02/LDA: Room air. IV saline locked  D/C Date: TBD  Others: Coccyx wound care done PRN. Spinal tap done today 4/29/25.

## 2025-04-30 ENCOUNTER — APPOINTMENT (OUTPATIENT)
Dept: MRI IMAGING | Facility: CLINIC | Age: 65
DRG: 056 | End: 2025-04-30
Attending: STUDENT IN AN ORGANIZED HEALTH CARE EDUCATION/TRAINING PROGRAM
Payer: MEDICARE

## 2025-04-30 LAB
ANION GAP SERPL CALCULATED.3IONS-SCNC: 11 MMOL/L (ref 7–15)
BACTERIA BLD CULT: NO GROWTH
BACTERIA BLD CULT: NO GROWTH
BUN SERPL-MCNC: 26.5 MG/DL (ref 8–23)
CA-I BLD-MCNC: 6.3 MG/DL (ref 4.4–5.2)
CALCIUM SERPL-MCNC: 12.2 MG/DL (ref 8.8–10.4)
CHLORIDE SERPL-SCNC: 106 MMOL/L (ref 98–107)
CREAT SERPL-MCNC: 0.91 MG/DL (ref 0.67–1.17)
EGFRCR SERPLBLD CKD-EPI 2021: >90 ML/MIN/1.73M2
ERYTHROCYTE [DISTWIDTH] IN BLOOD BY AUTOMATED COUNT: 18.5 % (ref 10–15)
GLUCOSE SERPL-MCNC: 107 MG/DL (ref 70–99)
HCO3 SERPL-SCNC: 25 MMOL/L (ref 22–29)
HCT VFR BLD AUTO: 42.6 % (ref 40–53)
HGB BLD-MCNC: 13.1 G/DL (ref 13.3–17.7)
HSV1 DNA CSF QL NAA+PROBE: NOT DETECTED
HSV2 DNA CSF QL NAA+PROBE: NOT DETECTED
LACTATE SERPL-SCNC: 1 MMOL/L (ref 0.7–2)
MCH RBC QN AUTO: 18.9 PG (ref 26.5–33)
MCHC RBC AUTO-ENTMCNC: 30.8 G/DL (ref 31.5–36.5)
MCV RBC AUTO: 62 FL (ref 78–100)
PLATELET # BLD AUTO: 559 10E3/UL (ref 150–450)
POTASSIUM SERPL-SCNC: 4 MMOL/L (ref 3.4–5.3)
RBC # BLD AUTO: 6.92 10E6/UL (ref 4.4–5.9)
SODIUM SERPL-SCNC: 142 MMOL/L (ref 135–145)
WBC # BLD AUTO: 15.1 10E3/UL (ref 4–11)

## 2025-04-30 PROCEDURE — 250N000013 HC RX MED GY IP 250 OP 250 PS 637: Performed by: STUDENT IN AN ORGANIZED HEALTH CARE EDUCATION/TRAINING PROGRAM

## 2025-04-30 PROCEDURE — 80048 BASIC METABOLIC PNL TOTAL CA: CPT | Performed by: INTERNAL MEDICINE

## 2025-04-30 PROCEDURE — 85048 AUTOMATED LEUKOCYTE COUNT: CPT | Performed by: INTERNAL MEDICINE

## 2025-04-30 PROCEDURE — 250N000011 HC RX IP 250 OP 636: Performed by: INTERNAL MEDICINE

## 2025-04-30 PROCEDURE — A9585 GADOBUTROL INJECTION: HCPCS | Performed by: STUDENT IN AN ORGANIZED HEALTH CARE EDUCATION/TRAINING PROGRAM

## 2025-04-30 PROCEDURE — 99232 SBSQ HOSP IP/OBS MODERATE 35: CPT | Performed by: INTERNAL MEDICINE

## 2025-04-30 PROCEDURE — 120N000001 HC R&B MED SURG/OB

## 2025-04-30 PROCEDURE — 36415 COLL VENOUS BLD VENIPUNCTURE: CPT | Performed by: INTERNAL MEDICINE

## 2025-04-30 PROCEDURE — 70553 MRI BRAIN STEM W/O & W/DYE: CPT

## 2025-04-30 PROCEDURE — 255N000002 HC RX 255 OP 636: Performed by: STUDENT IN AN ORGANIZED HEALTH CARE EDUCATION/TRAINING PROGRAM

## 2025-04-30 PROCEDURE — 72156 MRI NECK SPINE W/O & W/DYE: CPT

## 2025-04-30 PROCEDURE — 82330 ASSAY OF CALCIUM: CPT | Performed by: INTERNAL MEDICINE

## 2025-04-30 PROCEDURE — 99222 1ST HOSP IP/OBS MODERATE 55: CPT | Performed by: INTERNAL MEDICINE

## 2025-04-30 PROCEDURE — 83605 ASSAY OF LACTIC ACID: CPT | Performed by: INTERNAL MEDICINE

## 2025-04-30 PROCEDURE — 250N000013 HC RX MED GY IP 250 OP 250 PS 637: Performed by: INTERNAL MEDICINE

## 2025-04-30 PROCEDURE — 250N000013 HC RX MED GY IP 250 OP 250 PS 637: Performed by: PHYSICIAN ASSISTANT

## 2025-04-30 PROCEDURE — 250N000011 HC RX IP 250 OP 636: Performed by: PHYSICIAN ASSISTANT

## 2025-04-30 PROCEDURE — 250N000013 HC RX MED GY IP 250 OP 250 PS 637: Performed by: HOSPITALIST

## 2025-04-30 RX ORDER — SODIUM CHLORIDE 9 MG/ML
INJECTION, SOLUTION INTRAVENOUS CONTINUOUS
Status: DISCONTINUED | OUTPATIENT
Start: 2025-04-30 | End: 2025-04-30

## 2025-04-30 RX ORDER — CINACALCET 30 MG/1
90 TABLET, FILM COATED ORAL DAILY
Status: DISCONTINUED | OUTPATIENT
Start: 2025-05-01 | End: 2025-05-13 | Stop reason: HOSPADM

## 2025-04-30 RX ORDER — LORAZEPAM 2 MG/ML
2 INJECTION INTRAMUSCULAR ONCE
Status: COMPLETED | OUTPATIENT
Start: 2025-04-30 | End: 2025-04-30

## 2025-04-30 RX ORDER — GADOBUTROL 604.72 MG/ML
11 INJECTION INTRAVENOUS ONCE
Status: COMPLETED | OUTPATIENT
Start: 2025-04-30 | End: 2025-04-30

## 2025-04-30 RX ADMIN — GADOBUTROL 11 ML: 604.72 INJECTION INTRAVENOUS at 01:41

## 2025-04-30 RX ADMIN — MELATONIN TAB 3 MG 3 MG: 3 TAB at 21:39

## 2025-04-30 RX ADMIN — FUROSEMIDE 40 MG: 40 TABLET ORAL at 08:45

## 2025-04-30 RX ADMIN — LORAZEPAM 1 MG: 2 INJECTION INTRAMUSCULAR; INTRAVENOUS at 00:45

## 2025-04-30 RX ADMIN — AMLODIPINE BESYLATE 10 MG: 10 TABLET ORAL at 08:45

## 2025-04-30 RX ADMIN — LISINOPRIL 40 MG: 40 TABLET ORAL at 08:45

## 2025-04-30 RX ADMIN — METOPROLOL SUCCINATE 50 MG: 50 TABLET, EXTENDED RELEASE ORAL at 08:45

## 2025-04-30 RX ADMIN — CINACALCET 60 MG: 30 TABLET ORAL at 08:45

## 2025-04-30 RX ADMIN — DIVALPROEX SODIUM 500 MG: 500 TABLET, FILM COATED, EXTENDED RELEASE ORAL at 21:39

## 2025-04-30 RX ADMIN — LORAZEPAM 2 MG: 2 INJECTION INTRAMUSCULAR; INTRAVENOUS at 16:01

## 2025-04-30 RX ADMIN — FLUOXETINE HYDROCHLORIDE 20 MG: 20 CAPSULE ORAL at 16:01

## 2025-04-30 RX ADMIN — CLONIDINE HYDROCHLORIDE 0.1 MG: 0.1 TABLET ORAL at 21:39

## 2025-04-30 RX ADMIN — SENNOSIDES 1 TABLET: 8.6 TABLET ORAL at 08:45

## 2025-04-30 RX ADMIN — CLONIDINE HYDROCHLORIDE 0.1 MG: 0.1 TABLET ORAL at 08:45

## 2025-04-30 ASSESSMENT — ACTIVITIES OF DAILY LIVING (ADL)
ADLS_ACUITY_SCORE: 82
ADLS_ACUITY_SCORE: 78
ADLS_ACUITY_SCORE: 82
ADLS_ACUITY_SCORE: 78
ADLS_ACUITY_SCORE: 82

## 2025-04-30 NOTE — PLAN OF CARE
Date/Time: 4/30/25 0700 - 1900  Diagnosis: Aggressive behaviors at his care facility  Mental Status: NARESH  Activity/dangle: Ast of 2 Lift  Diet: Soft bite size w/ thin liquid, very poor intake  Pain: NARESH, pt comfortable in bed w/ no mourning or crying  Alberto/Voiding: Incontinent B&B, external cath in place  Tele/Restraints/Iso: N/A  02/LDA: Room air. IV saline locked  D/C Date: TBD  Others: Coccyx wound care done.

## 2025-04-30 NOTE — PROGRESS NOTES
Diagnosis: Aggressive Behaviors at his Care Facility, Hx of Dementia   Mental Status: NARESH  Activity/dangle Assist of 2 with turns and reposition  Diet: Soft Bite size with thine liquid    Pain: NARESH, No sign of discomfort noted.   Alberto/Voiding: Incontinent of B&B  Tele/Restraints/Iso: NA  02/LDA: On RA, PIV SL  D/C Date: Pending

## 2025-04-30 NOTE — PROGRESS NOTES
Care Management Follow Up    Length of Stay (days): 23    Expected Discharge Date: 04/30/2025     Concerns to be Addressed: discharge planning     Patient plan of care discussed at interdisciplinary rounds: Yes    Anticipated Discharge Disposition:        Anticipated Discharge Services:    Anticipated Discharge DME:      Patient/family educated on Medicare website which has current facility and service quality ratings:    Education Provided on the Discharge Plan:    Patient/Family in Agreement with the Plan: yes    Referrals Placed by CM/SW:    Private pay costs discussed: Not applicable    Discussed  Partnership in Safe Discharge Planning  document with patient/family: No     Handoff Completed: Yes, [facility] PCP: Internal handoff referral completed    Additional Information:  Called Director Vera at [facility] ([phone]); left message to inquire if she had reviewed the fax the SW sent yesterday with any plan to come to the hospital to do an assessment.    Next Steps: Await [facility]/ response on whether they can accept patient back to their facility.    Addendum at 1315:  Vera returned call.  She was updated on patient's current level of assist and behavior.  She referred me to her nurse Kimber ([phone]).    Also spoke with bedside nurse to provide facility with accurate update on his behavior and level.    Patient is currently assist of 2 and ricardo lift to the racheal-chair.   He communicates when spoken to, is confused, often repeats what the nurse says; he offers little with conversation.  He does not swear (a constant behavior at the ) and is not agitated.  He cooperates with cares but does not always follow command.  He can assist with turns and positions but tends to be very stiff and grabs onto the side rail and nursing has to direct him to let go.  He is incontinent..  Intake is recorded anywhere from 25-75% and needs to be assisted with eating.  Kimber is asking if he has lost weight but no weights have  been recorded.  As far as medication, he is on Depacote ER which is new to him and his Seroquel has been on hold.    Patient can return to their facility.  At current level of care, he will need a hospital bed, racheal-chair and ricardo lift ordered and delivered.  His family will have to remove the current bed he has as it is no longer appropriate.  They may have to come out to assess him here.    Facility nurse questions if patient needs a hospice consult before discharge.  They use [facility] and if hospice is decided then they would receive the DME through their coordination.      Updated Dr. Simental via [employer].  He responded and will talk with the guardian (spouse/daughter).  He states he will order the DME.    The facility do not take admissions on Friday-Sunday so discharge is likely Monday or Tuesday next week earliest.    Addendum at 4:25 pm:  DME orders for hospital bed and ricardo lift plus face sheet faxed to [facility].  Not sure how to order the requested racheal-chair but left a message with the fax.  Do not see face to face on these DME so likely will need to request from the hospitalist.  Per notes, hospitalist has added ID and Psychiatry consults.          Rayna Govea RN  Inpatient Float Care Coordinator  [facility]  [email or web address]

## 2025-04-30 NOTE — CONSULTS
RENAL CONSULTATION NOTE      REFERRING MD:  Hola    REASON FOR CONSULTATION:  Hypercalcemia        A/P:     1.  Baseline normal renal function   -creatinine 0.80 to 0.90 range  2.  Acute Kidney Injury this hospitalization    Peak creatinine 1.14 mg/dL dated     3.  Hypercalcemia    Hypercalcemia plus elevated PTH = primary hyperparathyroidism  Current management Cinacalcet    No documented localization studies  Unclear if patient will be a surgical candidate.    Noted increasing trend over the past 24 to 48 hours  May be in part related to diuretic use  Perhaps component of immobilization      Discontinue diuretic  Saline infusion  Increase Cinacalcet  Consider Zometa if calcium does not respond      HPI:     Patient up/bedside chair  Does not ambulate  Requires a Winnie lift    Awake and alert  Not following commands    Needs to be fed    As such no history available from the patient.    Data review demonstrates abnormal PTH levels dating to at least     Reviewed with bedside RN      ROS: A complete review of systems was limited nonverbal patient    PMH:    Past Medical History:   Diagnosis Date    Kidney stone     Serum calcium elevated     Tobacco use disorder     tobacco quit line referral done 06       PSH:    Past Surgical History:   Procedure Laterality Date    ANESTHESIA OUT OF OR MRI N/A 10/28/2024    Procedure: 1.5 MRI Brain;  Surgeon: GENERIC ANESTHESIA PROVIDER;  Location: [facility] OR    ROTATOR CUFF REPAIR RT/LT Right        MEDICATIONS:    No current outpatient medications on file.       ALLERGIES:    Allergies as of 2025    (No Known Allergies)       FH:    Family History   Problem Relation Age of Onset    Hyperlipidemia Father     Prostate Cancer Father          age 59 cancer prostate, liver    Hypertension Father     No Known Problems Sister     No Known Problems Sister     No Known Problems Sister     Cancer Maternal Grandmother          of cancer    Cerebrovascular  Disease Maternal Grandfather     No Known Problems Daughter     No Known Problems Daughter     No Known Problems Daughter     Diabetes No family hx of     Coronary Artery Disease No family hx of     Breast Cancer No family hx of     Colon Cancer No family hx of        SH:    Social History     Socioeconomic History    Marital status:      Spouse name: Sharon     Number of children: 3    Years of education: 12     Highest education level: Not on file   Occupational History    Occupation:       Employer: SELF   Tobacco Use    Smoking status: Every Day     Current packs/day: 1.00     Average packs/day: 1 pack/day for 35.0 years (35.0 ttl pk-yrs)     Types: Cigarettes    Smokeless tobacco: Never   Vaping Use    Vaping status: Never Used   Substance and Sexual Activity    Alcohol use: Yes     Alcohol/week: 0.0 standard drinks of alcohol    Drug use: No     Comment: no herbal meds either     Sexual activity: Yes     Partners: Female     Comment:  - Sharon    Other Topics Concern     Service No    Blood Transfusions No    Caffeine Concern Yes     Comment: 2- 20oz. cups coffee daily     Occupational Exposure Not Asked    Hobby Hazards Not Asked    Sleep Concern Not Asked    Stress Concern Not Asked    Weight Concern Not Asked    Special Diet Not Asked    Back Care Not Asked    Exercise Yes     Comment: not regular     Bike Helmet Not Asked    Seat Belt Yes     Comment: always     Self-Exams Yes    Parent/sibling w/ CABG, MI or angioplasty before 65F 55M? Not Asked   Social History Narrative    Not on file     Social Drivers of Health     Financial Resource Strain: Unknown (4/10/2025)    Financial Resource Strain     Within the past 12 months, have you or your family members you live with been unable to get utilities (heat, electricity) when it was really needed?: Patient unable to answer   Food Insecurity: Unknown (4/10/2025)    Food Insecurity     Within the past 12 months, did  you worry that your food would run out before you got money to buy more?: Patient unable to answer     Within the past 12 months, did the food you bought just not last and you didn t have money to get more?: Patient unable to answer   Transportation Needs: Unknown (4/10/2025)    Transportation Needs     Within the past 12 months, has lack of transportation kept you from medical appointments, getting your medicines, non-medical meetings or appointments, work, or from getting things that you need?: Patient unable to answer   Physical Activity: Insufficiently Active (2/20/2024)    Exercise Vital Sign     Days of Exercise per Week: 2 days     Minutes of Exercise per Session: 10 min   Stress: Stress Concern Present (2/20/2024)    [employer] - Occupational Stress Questionnaire     Feeling of Stress : To some extent   Social Connections: Unknown (2/20/2024)    Social Connection and Isolation Panel [NHANES]     Frequency of Communication with Friends and Family: Not on file     Frequency of Social Gatherings with Friends and Family: Once a week     Attends Confucianist Services: Not on file     Active Member of Clubs or Organizations: Not on file     Attends Club or Organization Meetings: Not on file     Marital Status: Not on file   Interpersonal Safety: Unknown (4/10/2025)    Interpersonal Safety     Do you feel physically and emotionally safe where you currently live?: Patient unable to answer     Within the past 12 months, have you been hit, slapped, kicked or otherwise physically hurt by someone?: Patient unable to answer     Within the past 12 months, have you been humiliated or emotionally abused in other ways by your partner or ex-partner?: Patient unable to answer   Housing Stability: Unknown (4/10/2025)    Housing Stability     Do you have housing? : Patient unable to answer     Are you worried about losing your housing?: Patient unable to answer       PHYSICAL EXAM:      Vitals were  reviewed  Patient Vitals for the past 12 hrs:   BP Temp Temp src Pulse Resp SpO2   04/30/25 1025 123/68 99.2  F (37.3  C) Axillary 71 18 96 %   04/30/25 0845 135/73 -- -- 67 -- --   04/30/25 0730 125/72 97  F (36.1  C) Axillary 61 18 95 %     I/O last 3 completed shifts:  In: 600 [P.O.:600]  Out: 400 [Urine:400]      There were no vitals filed for this visit.      GENERAL: awake, alert, follows  HEENT: NC/AT, PERRLA, EOMI, non icteric, pharynx moist without lesion  RESP:  clear anteriorly  CV: RRR, normal S1 S2  EXT: Varicose veins      LABS:        Recent Labs   Lab 04/30/25  0848      POTASSIUM 4.0   CHLORIDE 106   CO2 25   ANIONGAP 11   *   BUN 26.5*   CR 0.91   GFRESTIMATED >90   UDAY 12.2*     Recent Labs   Lab 04/30/25  0848 04/28/25  0451   POTASSIUM 4.0 3.8     Recent Labs   Lab 04/28/25  0451 04/27/25  0917 04/26/25  0759 04/24/25  0622   ALBUMIN 2.9* 3.1* 3.0* 3.4*     Recent Labs   Lab 04/30/25  0848 04/28/25  0451 04/27/25  0917 04/26/25  0759   HGB 13.1* 11.4* 12.1* 11.8*       Recent Labs   Lab 04/27/25  1528   PTHI 73*       DIAGNOSTICS:  Reviewed      YONAS Gambino    [facility]  [phone]

## 2025-04-30 NOTE — PROGRESS NOTES
[facility]    Hospitalist Progress Note    Interval History   - Patient alert, ongoing nonsensical speech, does not follow cues or commands clearly, slightly lower mood today  - Close to medically stable to discharge, pending improving calcium levels  - Called ex-wife Clifford and yanna. She notes that patient has had a precipitous decline over 1 year since their divorce last year, and particularly in the last two months to his condition today where he is minimally interactive and needing a Winnie lift.    Assessment & Plan   Summary: Estevan Aaron is a 65 year old male with PMH HTN, HLD, hyperparathyroidism, dementia who was admitted on 4/4/2025 for aggressive behaviors at his care facility.     Acute toxic metabolic encephalopathy, multifactorial, improved  Hospital-acquired delirium  Chronic neuropsychiatric disorder w/ behavioral disturbance, unclear etiology  Reported Alzheimer's dementia  History of prolonged inpatient psychiatric hospitalization 10/2024-late 2/2025.  Prior to admission, patient was ambulating independently at [facility].  Reportedly aggressive behaviors at [facility]. Calm and cooperative with staff in the ED, although significant behavioral disturbance noted early this admission.  Psychiatry involved for assistance with medication titration, however, developed severe encephalopathy with persistent somnolence.  Mental status showed gradual improvement, although worsening somnolence noted on 4/24/2025 compared to prior days. MRI brain 4/24 without acute changes. Of note, patient carries Alzheimer's diagnosis, although significant history of underlying psychiatric disease and concern for paranoid personality disorder. Patient's ex-wife/guardian describes rather accelerated mental status decline beginning in late 2024. At this time, suspect multifactorial encephalopathy 2/2 delirium, psychoactive medication effects, and undifferentiated neuropsychiatric  disorder.   Note elevated CRP 62-->21 , ESR 4/27/2025. EEG 4/26 notable for probable ongoing encephalopathy, no seizures.   Lumbar puncture with normal cell counts on 4/29, neg viral CSF PCR. MRI cervical spine without acute abnormalities 4/29.   Etiology of persistent encephalopathy and worsening functional status unclear (now requiring Winnie lift, assist of 2, not following commands and having nonsensical speech). Consider occult infection (see fever below), consider rare neurological disorder (LP done 4/29), consider prolonged delirium, and consider progressive dementia.  - Appreciate Psychiatry consult   - Reconsult psychiatry 4/28 appreciated   - Reconsult psychiatry 4/30  - Holding seroquel  - Resumed depakote  - Appreciate Neurology consult  - Follow-up paraneoplastic panel, TPO ab, thyroglobulin ab  - Follow up full LP results  - Delirium precautions  - Neurology, Psychology, SLP following    Intermittent fever w/ leukocytosis, unclear etiology  Acute hypoxic hypercarbic respiratory failure, resolved  Concern for aspiration PNA vs pneumonitis  Sepsis w/ elevated lactate, resolved  Respiratory acidosis w/ metabolic compensation, resolved  RRT called early morning on 4/20/25 due to somnolence with development of congested cough, hypoxia to 84%, and borderline fever. Briefly required BiPAP and quickly weaned to O2 NC. CXR without opacity, although some concern for acute aspiration event in setting of suspected medication-mediated somnolence as above.  Now weaned off of supplemental oxygen with intermittent fevers and leukocytosis of unclear etiology.  UA noninfectious, no hemodynamic changes, no ongoing respiratory symptoms, COVID/flu negative, CXR stable, pressure injury noted without concern for infection, no other skin changes, and MRI without acute change. LFTs mildly elevated but no apparent pain with deep abdominal palpation. No rigidity on exam and no new medications. Initially considered LP to rule out  meningitis, although no nuchal rigidity and mental status now improving. Overall unclear exact etiology of fevers and challenging assessment given underlying dementia.  - O2 goal >88%, currently on RA  - Continue zosyn, plan empiric 10 days ending 4/29--last dose this afternoon   + Follow-up cultures, remain NGTD  - LP as above  - Resume lasix 4/29  - Continue holding psychoactive medications  - Will consult ID to evaluate for occult infection 4/30    Elevated LFTs, stable  Unclear etiology of mild elevation, no history of liver disease or failure. Possibly in setting of statin vs depakote use. Bili normal and no other culprit medication.  - Continue holding statin and depakote  - Repeat LFT in AM    Hyperparathyroidism w/ hypercalcemia  Ongoing hypercalcemia noted despite cinacalcet administration (held 4/23 to 4/26). Low suspicion that mild hypercalcemia is driving mental status changes. Repeat PTH is slightly elevated to 73 this admission. Ionized calcium is elevated  - Continue cinacalcet, increased to 60mg daily  - Resume lasix 4/29  - Daily BMP  - Consult nephrology 4/30 for elevating calcium despite cinacalcet     Deep tissue pressure injury, sacrococcygeal area, hospital-acquired - No signs of infection. WOC RN following  Suspected ADAM - No formal sleep study, although high suspicion. Outpatient sleep center referral on discharge  HTN - Continue PTA amlodipine, clonidine, lisinopril, metoprolol. Hydralazine prn for SBP >180  HLD - Holding statin given liver dysfunction  Hypernatremia, mild, resolved    Clinically Significant Risk Factors            # Hypercalcemia: Highest Ca = 12.2 mg/dL in last 2 days, will monitor as appropriate    # Hypoalbuminemia: Lowest albumin = 2.9 g/dL at 4/28/2025  4:51 AM, will monitor as appropriate     # Hypertension: Noted on problem list     # Acute Hypercapnic Respiratory Failure: based on venous blood gas results.  Continue supplemental oxygen and ventilatory support as  indicated.    # Dementia: noted on problem list            # Financial/Environmental Concerns: none          PT/OT: ordered--likely back to care facility when improved  Diet: Snacks/Supplements Adult: Ensure Enlive; With Meals  Room Service  Combination Diet Safe Tray - NO utensils; Soft and Bite Sized Diet (level 6); Thin Liquids (level 0)    DVT Prophylaxis: Pneumatic Compression Devices  Alberto Catheter: Not present  Lines: None     Cardiac Monitoring: None  Code Status: No CPR- Do NOT Intubate    Medically Ready for Discharge: Anticipated Tomorrow to [facility] facility, discussed with  likely will need multiple DME ordered for facility to accommodate him    Jonatan Simental MD  Hospitalist Service  [facility]  Securely message with [employer] (more info)  Text page via [employer] Paging/Directory     Data reviewed today: I reviewed all new labs and imaging results over the last 24 hours.    Physical Exam   Temp: 99.2  F (37.3  C) Temp src: Axillary BP: 123/68 Pulse: 71   Resp: 18 SpO2: 96 % O2 Device: Nasal cannula    There were no vitals filed for this visit.  Vital Signs with Ranges  Temp:  [97  F (36.1  C)-99.5  F (37.5  C)] 99.2  F (37.3  C)  Pulse:  [54-71] 71  Resp:  [18] 18  BP: (123-144)/(68-85) 123/68  FiO2 (%):  [1 %] 1 %  SpO2:  [92 %-96 %] 96 %  I/O last 3 completed shifts:  In: 600 [P.O.:600]  Out: 400 [Urine:400]  O2 requirements: none    Constitutional: Male appears tired, somnolent  HEENT: Eyes nonicteric  Cardiovascular: RRR, normal S1/2, no m/r/g  Respiratory: CTAB, no wheezing or crackles  Vascular: No LE pitting edema  GI: Normoactive bowel sounds, nontender  Neuro/Psych: A&O to self only nonsensical speech, moves all extremities    Medications   Current Facility-Administered Medications   Medication Dose Route Frequency Provider Last Rate Last Admin    No lozenges or gum should be given while patient on BIPAP/AVAPS/AVAPS AE   Does not apply Continuous PRN Dallin,  DILCIA Beltran CNP        Patient may continue current oral medications   Does not apply Continuous PRN Bill Zamarripa APRN CNP         Current Facility-Administered Medications   Medication Dose Route Frequency Provider Last Rate Last Admin    amLODIPine (NORVASC) tablet 10 mg  10 mg Oral Daily Troy Farley MD   10 mg at 04/30/25 0845    [Held by provider] atorvastatin (LIPITOR) tablet 40 mg  40 mg Oral Daily Armando Kern MD   40 mg at 04/20/25 2131    cinacalcet (SENSIPAR) tablet 60 mg  60 mg Oral Daily Troy Farley MD   60 mg at 04/30/25 0845    cloNIDine (CATAPRES) tablet 0.1 mg  0.1 mg Oral BID Armando Kern MD   0.1 mg at 04/30/25 0845    divalproex sodium extended-release (DEPAKOTE ER) 24 hr tablet 500 mg  500 mg Oral At Bedtime Eduardo Reza PA-C   500 mg at 04/29/25 2252    [Held by provider] enoxaparin ANTICOAGULANT (LOVENOX) injection 40 mg  40 mg Subcutaneous Q12H Troy Farley MD   40 mg at 04/27/25 2040    furosemide (LASIX) tablet 40 mg  40 mg Oral Daily Jonatan Simental MD   40 mg at 04/30/25 0845    lisinopril (ZESTRIL) tablet 40 mg  40 mg Oral Daily Troy Farley MD   40 mg at 04/30/25 0845    LORazepam (ATIVAN) injection 1 mg  1 mg Intravenous Once Laura Arriola MD        melatonin tablet 3 mg  3 mg Oral At Bedtime Oleksandr Kong MD   3 mg at 04/29/25 2252    metoprolol succinate ER (TOPROL XL) 24 hr tablet 50 mg  50 mg Oral Daily Armando Kern MD   50 mg at 04/30/25 0845    [Held by provider] QUEtiapine ER (SEROquel XR) 24 hr tablet 300 mg  300 mg Oral At Bedtime Troy Farley MD   300 mg at 04/21/25 2121    sennosides (SENOKOT) tablet 1 tablet  1 tablet Oral Daily Armando Kern MD   1 tablet at 04/30/25 0845       Data   Recent Labs   Lab 04/30/25  0848 04/29/25  0910 04/28/25  0451 04/27/25  0917   WBC 15.1*  --  14.3* 13.0*   HGB 13.1*  --  11.4* 12.1*   MCV 62*  --  61* 61*   * 410 415 362     --  140 142   POTASSIUM 4.0   --  3.8 3.7   CHLORIDE 106  --  108* 109*   CO2 25  --  22 23   BUN 26.5*  --  15.6 13.8   CR 0.91 0.88 0.82 0.74   ANIONGAP 11  --  10 10   UDAY 12.2*  --  11.2* 10.9*   *  --  98 146*   ALBUMIN  --   --  2.9* 3.1*   PROTTOTAL  --   --  6.4 6.6   BILITOTAL  --   --  0.7 0.7   ALKPHOS  --   --  131 121   ALT  --   --  75* 79*   AST  --   --  62* 71*       Imaging:   Recent Results (from the past 24 hours)   MR Brain w/o & w Contrast    Narrative    EXAM: MR BRAIN W/O and W CONTRAST  LOCATION: [facility]  DATE: 4/30/2025    INDICATION: AMS.  COMPARISON: 4/24/2025.  CONTRAST: 11 mL Gadavist.  TECHNIQUE: Routine multiplanar multisequence head MRI without and with intravenous contrast.    FINDINGS:  INTRACRANIAL CONTENTS: No acute or subacute infarct. No mass, acute hemorrhage, or extra-axial fluid collections. Scattered nonspecific T2/FLAIR hyperintensities within the cerebral white matter most consistent with mild chronic microvascular ischemic   change. Mild to moderate generalized cerebral atrophy. No hydrocephalus. Normal position of the cerebellar tonsils. No pathologic contrast enhancement.    SELLA: No abnormality accounting for technique.    OSSEOUS STRUCTURES/SOFT TISSUES: Normal marrow signal. The major intracranial vascular flow voids are maintained.     ORBITS: No abnormality accounting for technique.     SINUSES/MASTOIDS: No paranasal sinus mucosal disease. No middle ear or mastoid effusion.       Impression    IMPRESSION:  1.  No recent infarct, intracranial mass, abnormal enhancement or evidence of intracranial hemorrhage.  2.  Mild-to-moderate volume loss and mild presumed chronic small vessel ischemic changes.   MR Cervical Spine w/o & w Contrast    Narrative    EXAM: MR CERVICAL SPINE W/O and W CONTRAST  LOCATION: [facility]  DATE: 4/30/2025    INDICATION: Hyperreflexia and decreased movement of extremities.  COMPARISON: None.  CONTRAST: 11 mL  Gadavist.  TECHNIQUE: MRI Cervical Spine without and with IV contrast.    FINDINGS: Images are degraded by motion.  Cervical vertebra are grossly normal in height. Minimal alterations in the lateral alignment likely relate to degenerative changes. Bone marrow pattern is within normal limits. No abnormal cord signal. No extraspinal abnormality.    Craniovertebral junction and C1-C2: Normal.    C2-C3: Mild loss of disc height. No herniation. Moderate-to-advanced left hypertrophic facet arthropathy. No central canal stenosis. Mild-to-moderate left foraminal stenosis.     C3-C4: Normal disc height. Tiny central disc osteophyte complex. Mild right and moderate-to-advanced left facet arthropathy. No central canal stenosis. Mild-to-moderate left foraminal stenosis.     C4-C5: Minimal anterolisthesis. Mild loss of disc height. No herniation. Moderate right and mild left facet arthropathy. No central canal stenosis. There is moderate-to-severe right foraminal stenosis and mild left foraminal stenosis.     C5-C6: Mild-to-moderate loss of disc height. Right paracentral/foraminal broad-based disc osteophyte complex results in some effacement of the ventral thecal sac and narrowing of the right lateral recess. Mild-to-moderate facet arthropathy. No central   canal stenosis. There is severe right and mild-to-moderate left foraminal stenosis.     C6-C7: Moderate loss of disc height. Minimal disc bulging or spurring. Mild-to-moderate facet arthropathy. No central canal stenosis. Moderate-to-severe right and mild-to-moderate left foraminal stenosis.     C7-T1: Normal disc height. No herniation. Normal facets. No spinal canal or neural foraminal stenosis.      Impression    IMPRESSION:  1.  No cord signal abnormality or abnormal cord enhancement.  2.  No central canal stenosis.  3.  Multilevel foraminal stenosis as above.

## 2025-04-30 NOTE — CONSULTS
Psychiatry Consultation; Follow up              Reason for Consult, requesting source:      Requesting source: Oleksandr Kong    Labs and imaging reviewed,     Total time spent in chart review, patient interview and coordination of care;            Interim history:    Patient remains largely unable to participate in assessment. Today patient displays echolalia, and is observed with his arms raised above his head, making repeated light scratching motions on the top of his head. This behavior continues for 30 seconds before writer attempted to reposition patients arms. He is initially resistant to repositioning, but then allows arms to be lowered. Then rests arms on lap. Does not maintain elevated position when placed there. He is staring at no particular object or location. Largely is not able to follow instructions, but sometimes seems able to comprehend them. He has not been able to offer much conversation or real insight into his perception or experience throughout his hospitalization.         Current Medications:     Current Facility-Administered Medications   Medication Dose Route Frequency Provider Last Rate Last Admin    amLODIPine (NORVASC) tablet 10 mg  10 mg Oral Daily Troy Farley MD   10 mg at 04/30/25 0845    [Held by provider] atorvastatin (LIPITOR) tablet 40 mg  40 mg Oral Daily Armando Kern MD   40 mg at 04/20/25 2131    [START ON 5/1/2025] cinacalcet (SENSIPAR) tablet 90 mg  90 mg Oral Daily NIKOLAY Gambino MD        cloNIDine (CATAPRES) tablet 0.1 mg  0.1 mg Oral BID Armando Kern MD   0.1 mg at 04/30/25 0845    divalproex sodium extended-release (DEPAKOTE ER) 24 hr tablet 500 mg  500 mg Oral At Bedtime Eduardo Reza PA-C   500 mg at 04/29/25 2252    [Held by provider] enoxaparin ANTICOAGULANT (LOVENOX) injection 40 mg  40 mg Subcutaneous Q12H Troy Farley MD   40 mg at 04/27/25 2040    FLUoxetine (PROzac) capsule 20 mg  20 mg Oral Daily Eduardo Reza PA-C         "lisinopril (ZESTRIL) tablet 40 mg  40 mg Oral Daily Troy Farley MD   40 mg at 04/30/25 0845    LORazepam (ATIVAN) injection 1 mg  1 mg Intravenous Once Laura Arriola MD        melatonin tablet 3 mg  3 mg Oral At Bedtime Oleksandr Kong MD   3 mg at 04/29/25 2252    metoprolol succinate ER (TOPROL XL) 24 hr tablet 50 mg  50 mg Oral Daily Armando Kern MD   50 mg at 04/30/25 0845    [Held by provider] QUEtiapine ER (SEROquel XR) 24 hr tablet 300 mg  300 mg Oral At Bedtime Troy Farley MD   300 mg at 04/21/25 2121    sennosides (SENOKOT) tablet 1 tablet  1 tablet Oral Daily Armando Kern MD   1 tablet at 04/30/25 0845              MSE:   Appearance: awake, alert  Attitude:  uncooperative  Eye Contact:  intense  Mood:      Affect:  intensity is flat and immobile  Speech:  mute  Psychomotor Behavior:  fidgeting  Muscle strength and tone: intact   Thought Process:    Associations:    Thought Content:    Insight:    Judgement:    Oriented to:    Attention Span and Concentration:    Recent and Remote Memory:      Vital signs:  Temp: 99.2  F (37.3  C) Temp src: Axillary BP: 123/68 Pulse: 71   Resp: 18 SpO2: 96 % O2 Device: Nasal cannula Oxygen Delivery: 2 LPM      Estimated body mass index is 40.9 kg/m  as calculated from the following:    Height as of 10/10/24: 1.676 m (5' 6\").    Weight as of 2/23/25: 114.9 kg (253 lb 6.4 oz).    Qtc:          DSM-5 Diagnosis:   Major neurocognitive disorder.           Assessment:   Electrolyte abnormalities can contribute to altered mental status, this includes calcium. Elevated calcium can occasionally cause catatonia. Dementia can also contribute to catatonia. Given his echolalia, staring, predominant mutism, odd postures, negatavism, and occasional sterotypy catatonia is a possible contributing factor to his current mental status.   To rule out catatonia I will schedule a one time dose of 2mg Ativan IV. If patient becomes more verbal, able to ambulate, more able " "to follow instructions this would support a catatonia diagnosis.             Summary of Recommendations:   If patient responds well to one time dose of ativan 2mg IV I recommend scheduling IV ativan 2mg at least BID.   Scheduled prozac 20mg Daily to help irritability.  Please consult psychiatry again tomorrow 5/1 for follow up  Nursing staff, please document patient response 1 hour after administration of IV ativan dose, thank you    \"Much or all of the text in this note was generated through the use of Dragon Dictate voice to text software. Errors in spelling or words which appear to be out of contact are unintentional, may be present due having escaped editing\"          "

## 2025-04-30 NOTE — PROGRESS NOTES
4/30/2025 [id number]    Diagnosis: Aggressive Behavior at his care facility, Alzheimers                     Dementia, Intermittent fever with leukocytosis of                     Unclear etiology   Mental Status: NARESH  Activity/dangle Assist of 2 with turns and reposition, Lift  Diet: Soft Bite size with thin liquids    Pain: No s/s of pain noted  Alberto/Voiding: Incontinent of B&B  Tele/Restraints/Iso: None  02/LDA: O2 at 2lpm/NC, purewick  D/C Date: Pending  Other Info: T/R q 2H, PU buttocks. MRI done, Lorazepam given prior to procedure.

## 2025-05-01 LAB
AL BETA42 CSF-MCNC: 516 PG/ML
ALB CSF/SERPL: 5.3 RATIO
ALBUMIN CSF-MCNC: 16 MG/DL
ALBUMIN SERPL-MCNC: 3017 MG/DL
ANION GAP SERPL CALCULATED.3IONS-SCNC: 9 MMOL/L (ref 7–15)
B2 MICROGLOB CSF-MCNC: 1.6 MG/L
BACTERIA CSF CULT: NORMAL
BACTERIA CSF CULT: NORMAL
BUN SERPL-MCNC: 33.9 MG/DL (ref 8–23)
CALCIUM SERPL-MCNC: 12.6 MG/DL (ref 8.8–10.4)
CHLORIDE SERPL-SCNC: 109 MMOL/L (ref 98–107)
CREAT SERPL-MCNC: 0.91 MG/DL (ref 0.67–1.17)
EGFRCR SERPLBLD CKD-EPI 2021: >90 ML/MIN/1.73M2
GLUCOSE BLDC GLUCOMTR-MCNC: 105 MG/DL (ref 70–99)
GLUCOSE SERPL-MCNC: 109 MG/DL (ref 70–99)
GRAM STAIN RESULT: NORMAL
GRAM STAIN RESULT: NORMAL
HCO3 SERPL-SCNC: 27 MMOL/L (ref 22–29)
IGG CSF-MCNC: 2.9 MG/DL
IGG SERPL-MCNC: 1140 MG/DL
IGG SYNTH RATE SER+CSF CALC-MRATE: <0 MG/D
IGG/ALB CLEAR SER+CSF-RTO: 0.48 RATIO
IGG/ALB CSF: 0.18 RATIO
IMMUNOLOGIST REVIEW: ABNORMAL
LACTATE SERPL-SCNC: 1 MMOL/L (ref 0.7–2)
OLIGOCLONAL BANDS CSF ELPH-IMP: ABNORMAL
OLIGOCLONAL BANDS CSF ELPH-IMP: NEGATIVE
OLIGOCLONAL BANDS CSF IEF: 0 BANDS
P-TAU181 CSF IA-MCNC: 22.9 PG/ML
PATH REPORT.COMMENTS IMP SPEC: NORMAL
PATH REPORT.FINAL DX SPEC: NORMAL
PATH REPORT.GROSS SPEC: NORMAL
PATH REPORT.MICROSCOPIC SPEC OTHER STN: NORMAL
PATH REPORT.RELEVANT HX SPEC: NORMAL
POTASSIUM SERPL-SCNC: 4.2 MMOL/L (ref 3.4–5.3)
SCANNED LAB RESULT: NORMAL
SODIUM SERPL-SCNC: 145 MMOL/L (ref 135–145)
TAU PROT CSF-MCNC: 243 PG/ML
TAU PROT/AL BETA42 CSF-RTO: 0.04 RATIO

## 2025-05-01 PROCEDURE — 80048 BASIC METABOLIC PNL TOTAL CA: CPT | Performed by: INTERNAL MEDICINE

## 2025-05-01 PROCEDURE — 250N000013 HC RX MED GY IP 250 OP 250 PS 637: Performed by: INTERNAL MEDICINE

## 2025-05-01 PROCEDURE — 36415 COLL VENOUS BLD VENIPUNCTURE: CPT | Performed by: INTERNAL MEDICINE

## 2025-05-01 PROCEDURE — 88108 CYTOPATH CONCENTRATE TECH: CPT | Mod: 26 | Performed by: PATHOLOGY

## 2025-05-01 PROCEDURE — 120N000001 HC R&B MED SURG/OB

## 2025-05-01 PROCEDURE — 258N000003 HC RX IP 258 OP 636: Performed by: INTERNAL MEDICINE

## 2025-05-01 PROCEDURE — 250N000013 HC RX MED GY IP 250 OP 250 PS 637: Performed by: PHYSICIAN ASSISTANT

## 2025-05-01 PROCEDURE — 83605 ASSAY OF LACTIC ACID: CPT | Performed by: INTERNAL MEDICINE

## 2025-05-01 PROCEDURE — 250N000013 HC RX MED GY IP 250 OP 250 PS 637: Performed by: STUDENT IN AN ORGANIZED HEALTH CARE EDUCATION/TRAINING PROGRAM

## 2025-05-01 PROCEDURE — 99222 1ST HOSP IP/OBS MODERATE 55: CPT | Performed by: PHYSICIAN ASSISTANT

## 2025-05-01 PROCEDURE — 250N000011 HC RX IP 250 OP 636: Mod: JZ | Performed by: INTERNAL MEDICINE

## 2025-05-01 PROCEDURE — 99231 SBSQ HOSP IP/OBS SF/LOW 25: CPT | Performed by: INTERNAL MEDICINE

## 2025-05-01 PROCEDURE — 99232 SBSQ HOSP IP/OBS MODERATE 35: CPT | Performed by: INTERNAL MEDICINE

## 2025-05-01 RX ADMIN — SENNOSIDES 1 TABLET: 8.6 TABLET ORAL at 08:57

## 2025-05-01 RX ADMIN — METOPROLOL SUCCINATE 50 MG: 50 TABLET, EXTENDED RELEASE ORAL at 08:57

## 2025-05-01 RX ADMIN — CINACALCET 90 MG: 30 TABLET ORAL at 08:58

## 2025-05-01 RX ADMIN — FLUOXETINE HYDROCHLORIDE 20 MG: 20 CAPSULE ORAL at 08:56

## 2025-05-01 RX ADMIN — SODIUM CHLORIDE 500 ML: 0.9 INJECTION, SOLUTION INTRAVENOUS at 15:49

## 2025-05-01 RX ADMIN — AMLODIPINE BESYLATE 10 MG: 10 TABLET ORAL at 08:57

## 2025-05-01 RX ADMIN — LISINOPRIL 40 MG: 40 TABLET ORAL at 08:57

## 2025-05-01 RX ADMIN — ZOLEDRONIC ACID 4 MG: 4 INJECTION, SOLUTION, CONCENTRATE INTRAVENOUS at 13:10

## 2025-05-01 RX ADMIN — CLONIDINE HYDROCHLORIDE 0.1 MG: 0.1 TABLET ORAL at 08:56

## 2025-05-01 ASSESSMENT — ACTIVITIES OF DAILY LIVING (ADL)
ADLS_ACUITY_SCORE: 82
ADLS_ACUITY_SCORE: 82
ADLS_ACUITY_SCORE: 76
ADLS_ACUITY_SCORE: 82
ADLS_ACUITY_SCORE: 76
ADLS_ACUITY_SCORE: 74
ADLS_ACUITY_SCORE: 78
ADLS_ACUITY_SCORE: 76
ADLS_ACUITY_SCORE: 76
ADLS_ACUITY_SCORE: 82
ADLS_ACUITY_SCORE: 76
ADLS_ACUITY_SCORE: 78
ADLS_ACUITY_SCORE: 82
ADLS_ACUITY_SCORE: 74
ADLS_ACUITY_SCORE: 74
ADLS_ACUITY_SCORE: 76

## 2025-05-01 NOTE — PROGRESS NOTES
Pt was unable to awaken to eat lunch vss blood glucose 105. Hospitalist came told about it unable to awaken pt.

## 2025-05-01 NOTE — PLAN OF CARE
Diagnosis: Aggressive Behaviors at his Care Facility, Hx of Dementia    POD# NA  Mental Status: NARESH  Activity/dangle Assist of 2 with Lift. Turn and repo  Diet: Soft Bite Diet  Pain: No sign of pain or discomfort   Alberto/Voiding: External Cath  Tele/Restraints/Iso: NA  02/LDA: On 2 L NC. PIV SL  D/C Date: Pending

## 2025-05-01 NOTE — PROGRESS NOTES
Renal Medicine         Seen 04.30.25 regarding hyperparathyroidism/hypercalcemia    Cinacalcet increased to 90 mg  Diuretic discontinued  Did not receive NS infusion  (Unable to keep IV in place)    Reviewed with bedside RN        Note increased Ca today     Will not attempt further IVF  Zometa 4 mg    Watch Ca   May need to reduce cinacalcet         Recent Labs   Lab 05/01/25  0622      POTASSIUM 4.2   CHLORIDE 109*   CO2 27   ANIONGAP 9   *   BUN 33.9*   CR 0.91   GFRESTIMATED >90   UDAY 12.6*           YONAS Gambino    [facility]  [phone]

## 2025-05-01 NOTE — PROGRESS NOTES
Care Management Follow Up    Length of Stay (days): 24    Expected Discharge Date: 05/01/2025     Concerns to be Addressed: discharge planning     Patient plan of care discussed at interdisciplinary rounds: Yes    Anticipated Discharge Disposition:                Anticipated Discharge Services:    Anticipated Discharge DME:      Patient/family educated on Medicare website which has current facility and service quality ratings:    Education Provided on the Discharge Plan:    Patient/Family in Agreement with the Plan: yes    Referrals Placed by CM/SW:    Private pay costs discussed: Not applicable    Discussed  Partnership in Safe Discharge Planning  document with patient/family: No     Handoff Completed: No, handoff not indicated or clinically appropriate    Additional Information:  Writer called [facility] and spoke with Ezekiel georges the orders CC faxed yesterday.  Per Ezekiel they do not have a Winnie currently and do not have Kay Chairs.  Writer will  look into another DME agency.  Writer called [facility] and spoke with Bailey.  She stated they do have hospital  bed and  winnie. She  gave fax number of [phone].  Writer faxed the MD order and the hospitalist note dated 4/30 to [facility], Noted on the fax cover that the items are needed 5/2.    Writer contacted [facility] and spoke with Kevin georges a Pop Chair.  They do not bill Medicare  so patient pays up front then submits proof of payment to Medicare separately.  Kevin sent an email with a basic Kay Chair.  The chair take 2 to 3 business days to deliver once payment received.  Writer called alden Lombardi and TIM re DME and requested a call  back.  Next Steps: wait  for confirmation, the DME will be delivered.    Kelsey Fernandez RN

## 2025-05-01 NOTE — CONSULTS
[facility]    Infectious Disease Consultation     Date of Admission:  4/4/2025  Date of Consult (When I saw the patient): 05/01/25    Assessment & Plan   Estevan Aaron is a 65 year old male who was admitted on 4/4/2025.     Impression:   65 year old male with a history of HTN, HLD, hyperparathyroidism, and dementia who was admitted on 4/4/2025 for aggressive behaviors at his care facility, found to have intermittent fever and encephalopathy of unknown etiology.     -Developed severe encephalopathy early in hospital stay and persistent somnolence  -Intermittent fever and persistent leukocytosis. LP and all micro has been negative to date. Some hypoxia early in stay, but CXR without pneumonia and completed 10 days broad antibiotics.   -Mildly elevated LFT's.  -Reported Alzheimer's.       Recommendations:   CNS infection has been ruled out with only one nucleated cell in his CSF. His CRP is trending down and he has been afebrile after completing 10+ days of antibiotics. Would recommend continuing to watch him off antibiotics. No other clear etiology of infection noted with all micro negative to date.      Patient and plan discussed with Dr. Flood.    Kyung Washington PA-C    Reason for Consult   Reason for consult: Asked to evaluate this patient for evaluate for occult infection given persistent encephalopathy .    Primary Care Physician   [facility]    Chief Complaint   Aggressive behavior at [facility] care facility.     History is obtained from the patient and medical records    History of Present Illness   Estevan Aaron is a 65 year old male with a history of HTN, HLD, hyperparathyroidism, and dementia who was admitted on 4/4/2025 for aggressive behaviors at his care facility. He had significant behavioral disturbance in the beginning of his hospitalization, but then developed severe encephalopathy and persistent somnolence. He did have some hypoxia early in hospital stay, but  he was quickly weaned down on oxygen and CXR without evidence of pneumonia. MRI brain and cervical spine without acute abnormality. UA, blood cultures, COVID/influenza/RSV were all negative. LP was done on 4/29, but only had one nucleated cell and meningitis panel was negative. He has had intermittent fevers, as high as 101.9, and persistent leukocytosis. He has received a total of 8 days of Zosyn and 3 days Ceftriaxone. He continues to remain very lethargic, sleeping almost all day.     Past Medical History   I have reviewed this patient's medical history and updated it with pertinent information if needed.   Past Medical History:   Diagnosis Date    Kidney stone     Serum calcium elevated     Tobacco use disorder     tobacco quit line referral done 4/19/06       Past Surgical History   I have reviewed this patient's surgical history and updated it with pertinent information if needed.  Past Surgical History:   Procedure Laterality Date    ANESTHESIA OUT OF OR MRI N/A 10/28/2024    Procedure: 1.5 MRI Brain;  Surgeon: GENERIC ANESTHESIA PROVIDER;  Location: [facility] OR    ROTATOR CUFF REPAIR RT/LT Right 2021       Prior to Admission Medications   Prior to Admission Medications   Prescriptions Last Dose Informant Patient Reported? Taking?   OLANZapine (ZYPREXA) 10 MG tablet 4/4/2025 Morning Nursing Home Yes Yes   Sig: 10 mg 2 times daily. At 7am and 5pm   OLANZapine (ZYPREXA) 15 MG tablet 4/3/2025 Evening Nursing Home Yes Yes   Sig: Take 15 mg by mouth at bedtime.   amLODIPine (NORVASC) 10 MG tablet 4/4/2025 Morning Nursing Home No Yes   Sig: Take 1 tablet (10 mg) by mouth daily   atorvastatin (LIPITOR) 40 MG tablet 4/3/2025 Evening Nursing Home No Yes   Sig: Take 1 tablet (40 mg) by mouth daily.   cinacalcet (SENSIPAR) 30 MG tablet 4/4/2025 Morning Nursing Home No Yes   Sig: Take 1 tablet (30 mg) by mouth daily.   cloNIDine (CATAPRES) 0.1 MG tablet 4/4/2025 Morning Nursing Home No Yes   Sig: Take 1 tablet (0.1 mg) by  mouth 2 times daily   furosemide (LASIX) 40 MG tablet 2025 Morning Nursing Home No Yes   Sig: Take 1 tablet (40 mg) by mouth daily.   lisinopril (ZESTRIL) 40 MG tablet 2025 Morning Nursing Home No Yes   Sig: Take 1 tablet (40 mg) by mouth daily.   melatonin 3 MG tablet 4/3/2025 Bedtime Nursing Home Yes Yes   Sig: Take 3 mg by mouth at bedtime.   metoprolol succinate ER (TOPROL XL) 50 MG 24 hr tablet 2025 Morning Nursing Home No Yes   Sig: Take 1 tablet (50 mg) by mouth daily.   sennosides (SENOKOT) 8.6 MG tablet 2025 Morning Nursing Home Yes Yes   Sig: Take 1 tablet by mouth daily.      Facility-Administered Medications: None     Allergies   No Known Allergies    Immunization History   Immunization History   Administered Date(s) Administered    COVID-19 MONOVALENT 12+ (Pfizer) 2021, 2021    Influenza Vaccine >6 months,quad, PF 2013    TDAP Vaccine (Boostrix) 2016    Zoster recombinant adjuvanted (Shingrix) 2024       Social History   I have reviewed this patient's social history and updated it with pertinent information if needed. Estevan PRABHAKAR Agnieszka  reports that he has been smoking cigarettes. He has a 35 pack-year smoking history. He has never used smokeless tobacco. He reports current alcohol use. He reports that he does not use drugs.    Family History   I have reviewed this patient's family history and updated it with pertinent information if needed.   Family History   Problem Relation Age of Onset    Hyperlipidemia Father     Prostate Cancer Father          age 59 cancer prostate, liver    Hypertension Father     No Known Problems Sister     No Known Problems Sister     No Known Problems Sister     Cancer Maternal Grandmother          of cancer    Cerebrovascular Disease Maternal Grandfather     No Known Problems Daughter     No Known Problems Daughter     No Known Problems Daughter     Diabetes No family hx of     Coronary Artery Disease No family hx of      Breast Cancer No family hx of     Colon Cancer No family hx of        Review of Systems   The 10 point Review of Systems is negative    Physical Exam   Temp: 98.6  F (37  C) Temp src: Axillary BP: 113/65 Pulse: 65   Resp: 18 SpO2: 95 % O2 Device: Nasal cannula Oxygen Delivery: 2 LPM  Vital Signs with Ranges  Temp:  [98.3  F (36.8  C)-99.2  F (37.3  C)] 98.6  F (37  C)  Pulse:  [65-82] 65  Resp:  [16-20] 18  BP: (113-130)/(64-81) 113/65  SpO2:  [92 %-96 %] 95 %  0 lbs 0 oz  There is no height or weight on file to calculate BMI.    GENERAL APPEARANCE:  Sleeping. Wont follow commands.   EYES: Eyes closed  NECK: no adenopathy  RESP: lungs clear   CV: regular rates and rhythm  ABDOMEN: soft, nontender  MS: extremities normal  SKIN: no suspicious lesions or rashes        Data   All laboratory data reviewed    Component      Latest Ref Rng 4/20/2025  3:34 AM 4/23/2025  6:19 AM 4/28/2025  4:51 AM 4/30/2025  8:48 AM   WBC      4.0 - 11.0 10e3/uL 13.1 (H)  9.4  14.3 (H)  15.1 (H)    RBC Count      4.40 - 5.90 10e6/uL 7.08 (H)  6.54 (H)  5.91 (H)  6.92 (H)    Hemoglobin      13.3 - 17.7 g/dL 13.9  12.6 (L)  11.4 (L)  13.1 (L)    Hematocrit      40.0 - 53.0 % 45.4  41.1  36.3 (L)  42.6    MCV      78 - 100 fL 64 (L)  63 (L)  61 (L)  62 (L)    MCH      26.5 - 33.0 pg 19.6 (L)  19.3 (L)  19.3 (L)  18.9 (L)    MCHC      31.5 - 36.5 g/dL 30.6 (L)  30.7 (L)  31.4 (L)  30.8 (L)    RDW      10.0 - 15.0 % 18.1 (H)  17.3 (H)  17.2 (H)  18.5 (H)    Platelet Count      150 - 450 10e3/uL 222  167  415  559 (H)       Component      Latest Ref Rng 4/20/2025  3:34 AM 4/22/2025  6:09 AM 4/28/2025  4:51 AM 5/1/2025  6:22 AM   Sodium      135 - 145 mmol/L 148 (H)  150 (H)  140  145    Potassium      3.4 - 5.3 mmol/L 3.9  4.0  3.8  4.2    Carbon Dioxide (CO2)      22 - 29 mmol/L 30 (H)  27  22  27    Anion Gap      7 - 15 mmol/L 12  9  10  9    Urea Nitrogen      8.0 - 23.0 mg/dL 19.5  23.7 (H)  15.6  33.9 (H)    Creatinine      0.67 - 1.17 mg/dL  1.02  1.02  0.82  0.91    GFR Estimate      >60 mL/min/1.73m2 82  81  >90  >90    Calcium      8.8 - 10.4 mg/dL 11.2 (H)  10.8 (H)  11.2 (H)  12.6 (H)    Chloride      98 - 107 mmol/L 106  114 (H)  108 (H)  109 (H)    Glucose      70 - 99 mg/dL 113 (H)  93  98  109 (H)    Alkaline Phosphatase      40 - 150 U/L 102  73  131     AST      0 - 45 U/L 149 (H)  71 (H)  62 (H)     ALT      0 - 70 U/L 45  29  75 (H)     Protein Total      6.4 - 8.3 g/dL 7.5  6.3 (L)  6.4     Albumin      3.5 - 5.2 g/dL 4.2  3.3 (L)  2.9 (L)     Bilirubin Total      <=1.2 mg/dL 0.9  0.8  0.7        Component      Latest Ref Rng 4/20/2025  3:34 AM 4/26/2025  7:59 AM 4/27/2025  3:28 PM 4/30/2025  10:38 AM   Procalcitonin      <0.50 ng/mL 0.12  0.20      Lactic Acid      0.7 - 2.0 mmol/L 4.9 (HH)    1.0    Troponin T, High Sensitivity      <=22 ng/L 26 (H)       N-Terminal Pro BNP Inpatient      0 - 900 pg/mL 157       CRP Inflammation      <5.00 mg/L   62.51 (H)       Component      Latest Ref Rng 5/1/2025  6:22 AM   Procalcitonin      <0.50 ng/mL    Lactic Acid      0.7 - 2.0 mmol/L 1.0    Troponin T, High Sensitivity      <=22 ng/L    N-Terminal Pro BNP Inpatient      0 - 900 pg/mL    CRP Inflammation      <5.00 mg/L       04/29/2025 1128 04/29/2025 1156 Encephalopathy, Autoimmune Evaluation Cascade, Spinal Fluid, Adult: [[id number]]   Cerebrospinal fluid from Lumbar Puncture    In process Component Value   No component results          04/29/2025 1127 04/29/2025 1552 Cryptococcus antigen CSF [[id number]]   Cerebrospinal fluid from Lumbar Puncture    Final result Component Value   Cryptococcal Antigen Negative   Cryptococcal Antigen Specimen Type Cerebrospinal fluid          04/29/2025 1127 04/29/2025 1702 Meningitis/Encephalitis Panel Qual PCR CSF: [[id number]]    Cerebrospinal fluid from Lumbar Puncture    Final result Component Value   Escherichia coli K1 Negative   Haemophilus influenzae Negative   Listeria monocytogenes Negative    Neisseria meningitidis Negative   Streptococcus agalactiae (GBS) Negative   Streptococcus pneumoniae Negative   Cytomegalovirus Negative   Enterovirus Negative   Herpes simplex virus 1 Negative   Recommend testing with another molecular method if clinical suspicion for infection is high.   Herpes simplex virus 2 Negative   Recommend testing with another molecular method if clinical suspicion for infection is high.   Human Herpes Virus 6 Negative   Human parechovirus Negative   Varicella zoster virus Negative   Cryptococcus neoformans/gattii Negative   Recommend testing for Cryptococcal antigen and fungal culture if clinical suspicion for infection is high.          04/29/2025 1125 04/30/2025 0933 HSV Types 1 and 2 Qualitative PCR CSF [[id number]]    Cerebrospinal fluid from Lumbar Puncture    Final result Component Value   Herpes Simplex Virus 1 DNA - CSF Not Detected   Herpes simplex virus type 1 DNA not detected, presumed negative for HSV-1. A negative result does not rule out the presence of PCR inhibitors or HSV DNA in concentrations below the limit of detection.   Herpes Simplex Virus 2 DNA - CSF Not Detected   Herpes simplex virus type 2 DNA not detected, presumed negative for HSV-2. A negative result does not rule out the presence of PCR inhibitors or HSV DNA in concentrations below the limit of detection.          04/29/2025 1125 05/01/2025 0953 Blastomyces Agn Quant EIA Non Blood CSF [[id number]]   Cerebrospinal fluid from Lumbar Puncture    Final result Component Value   See Scanned Result BLASTOMYCES AGN QUANT EIA NON BLOOD-Scanned          04/29/2025 1124 05/01/2025 1145 Cytology non gyn CSF [[id number]]   Cerebrospinal fluid from Lumbar Puncture    Final result Component Value   Final Diagnosis Specimen A Lumbar Puncture, , CSF:     Interpretation:     Negative for malignancy (Please see microscopic description)          Adequacy:     Satisfactory for evaluation       Clinical Information Altered  mental status, accelerated dementia   Gross Description A(A). Lumbar Puncture, :A. Lumbar Puncture, , CSF:  Received 1 ml of clear, colorless fluid, processed as 1 Pap stained cytospin and 1 Ross stained cytospin.              Microscopic Description Specimen is practically acellular.   Performing Labs The technical component of this testing was completed at [facility].    Stain controls for all stains resulted within this report have been reviewed and show appropriate reactivity.          04/29/2025 1124 04/30/2025 1606 Fungus Culture, non-blood [[id number]]   Cerebrospinal fluid from Lumbar Puncture    Preliminary result Component Value   Culture No growth after 1 day P             04/29/2025 1123 04/29/2025 1337 CSF Cell Count with Differential: [[id number]]    Cerebrospinal fluid from Lumbar Puncture    Final result Component Value   No component results          04/29/2025 1123 05/01/2025 1029 Cerebrospinal fluid Aerobic Bacterial Culture Routine With Gram Stain [[id number]]    Cerebrospinal fluid from Lumbar Puncture    Preliminary result Component Value   Culture No growth after 1 day P   Gram Stain Result No organisms seen P    1+ WBC seen P             04/29/2025 1123 04/29/2025 1337 Cell Count CSF [[id number]]   Cerebrospinal fluid from Lumbar Puncture    Final result Component Value Units   Tube Number 4    Color Colorless    Clarity Clear    Total Nucleated Cells 1 /uL   RBC Count 1 /uL          04/26/2025 0733 04/26/2025 0820 Influenza A/B, RSV and SARS-CoV2 PCR (COVID-19) Nose [[id number]]    Swab from Nose    Final result Component Value   Influenza A PCR Negative   Influenza B PCR Negative   RSV PCR Negative   SARS CoV2 PCR Negative   NEGATIVE: SARS-CoV-2 (COVID-19) RNA not detected, presumed negative.          04/25/2025 1657 04/30/2025 2016 Blood Culture Hand, Right [[id number]]   Blood from Hand, Right    Final  result Component Value   Culture No Growth             04/25/2025 1657 04/30/2025 2006 Blood Culture Hand, Left [[id number]]   Blood from Hand, Left    Final result Component Value   Culture No Growth          EXAM: MR CERVICAL SPINE W/O and W CONTRAST  LOCATION: [facility]  DATE: 4/30/2025    INDICATION: Hyperreflexia and decreased movement of extremities.  COMPARISON: None.  CONTRAST: 11 mL Gadavist.  TECHNIQUE: MRI Cervical Spine without and with IV contrast.    FINDINGS: Images are degraded by motion.  Cervical vertebra are grossly normal in height. Minimal alterations in the lateral alignment likely relate to degenerative changes. Bone marrow pattern is within normal limits. No abnormal cord signal. No extraspinal abnormality.    Craniovertebral junction and C1-C2: Normal.    C2-C3: Mild loss of disc height. No herniation. Moderate-to-advanced left hypertrophic facet arthropathy. No central canal stenosis. Mild-to-moderate left foraminal stenosis.    C3-C4: Normal disc height. Tiny central disc osteophyte complex. Mild right and moderate-to-advanced left facet arthropathy. No central canal stenosis. Mild-to-moderate left foraminal stenosis.    C4-C5: Minimal anterolisthesis. Mild loss of disc height. No herniation. Moderate right and mild left facet arthropathy. No central canal stenosis. There is moderate-to-severe right foraminal stenosis and mild left foraminal stenosis.    C5-C6: Mild-to-moderate loss of disc height. Right paracentral/foraminal broad-based disc osteophyte complex results in some effacement of the ventral thecal sac and narrowing of the right lateral recess. Mild-to-moderate facet arthropathy. No central  canal stenosis. There is severe right and mild-to-moderate left foraminal stenosis.    C6-C7: Moderate loss of disc height. Minimal disc bulging or spurring. Mild-to-moderate facet arthropathy. No central canal stenosis. Moderate-to-severe right and  mild-to-moderate left foraminal stenosis.    C7-T1: Normal disc height. No herniation. Normal facets. No spinal canal or neural foraminal stenosis.   Impression:     IMPRESSION:  1.  No cord signal abnormality or abnormal cord enhancement.  2.  No central canal stenosis.  3.  Multilevel foraminal stenosis as above       EXAM: MR BRAIN W/O and W CONTRAST  LOCATION: [facility]  DATE: 4/30/2025    INDICATION: AMS.  COMPARISON: 4/24/2025.  CONTRAST: 11 mL Gadavist.  TECHNIQUE: Routine multiplanar multisequence head MRI without and with intravenous contrast.    FINDINGS:  INTRACRANIAL CONTENTS: No acute or subacute infarct. No mass, acute hemorrhage, or extra-axial fluid collections. Scattered nonspecific T2/FLAIR hyperintensities within the cerebral white matter most consistent with mild chronic microvascular ischemic  change. Mild to moderate generalized cerebral atrophy. No hydrocephalus. Normal position of the cerebellar tonsils. No pathologic contrast enhancement.    SELLA: No abnormality accounting for technique.    OSSEOUS STRUCTURES/SOFT TISSUES: Normal marrow signal. The major intracranial vascular flow voids are maintained.    ORBITS: No abnormality accounting for technique.    SINUSES/MASTOIDS: No paranasal sinus mucosal disease. No middle ear or mastoid effusion.   Impression:     IMPRESSION:  1.  No recent infarct, intracranial mass, abnormal enhancement or evidence of intracranial hemorrhage.  2.  Mild-to-moderate volume loss and mild presumed chronic small vessel ischemic changes.         EXAM: XR CHEST PORT 1 VIEW  LOCATION: [facility]  DATE: 4/25/2025    INDICATION: Fever.  COMPARISON: 4/22/2025.   Impression:     IMPRESSION: Mild interstitial prominence at the left lung base, favoring scar versus atelectasis. No large focal airspace consolidation. Possible small left pleural effusion versus costophrenic scarring.    No pneumothorax.    Heart size is  normal. Atherosclerotic calcifications of the thoracic aorta

## 2025-05-01 NOTE — PROGRESS NOTES
[facility]    Hospitalist Progress Note    Interval History   - Sleeping this afternoon. Per bedside nurse patient was calm, cooperative in morning  - Tolerating IV so will give NS bolus this afternoon  - Calcium more elevated today, Zometa per Nephrology  - Awaiting stable calcium before discharge    Assessment & Plan   Summary: Estevan Aaron is a 65 year old male with PMH HTN, HLD, hyperparathyroidism, dementia who was admitted on 4/4/2025 for aggressive behaviors at his care facility.     Acute toxic metabolic encephalopathy, multifactorial, improved  Hospital-acquired delirium  Chronic neuropsychiatric disorder w/ behavioral disturbance, unclear etiology  Reported Alzheimer's dementia  History of prolonged inpatient psychiatric hospitalization 10/2024-late 2/2025.  Prior to admission, patient was ambulating independently at [facility].  Reportedly aggressive behaviors at [facility]. Calm and cooperative with staff in the ED, although significant behavioral disturbance noted early this admission.  Psychiatry involved for assistance with medication titration, however, developed severe encephalopathy with persistent somnolence.  Mental status showed gradual improvement, although worsening somnolence noted on 4/24/2025 compared to prior days. MRI brain 4/24 without acute changes. Of note, patient carries Alzheimer's diagnosis, although significant history of underlying psychiatric disease and concern for paranoid personality disorder. Patient's ex-wife/guardian describes rather accelerated mental status decline beginning in late 2024. At this time, suspect multifactorial encephalopathy 2/2 delirium, psychoactive medication effects, and undifferentiated neuropsychiatric disorder.   Note elevated CRP 62-->21 , ESR 4/27/2025. EEG 4/26 notable for probable ongoing encephalopathy, no seizures.   Lumbar puncture with normal cell counts on 4/29, neg viral CSF PCR. MRI cervical spine  without acute abnormalities 4/29.   Etiology of persistent encephalopathy and worsening functional status unclear (now requiring Winnie lift, assist of 2, not following commands and having nonsensical speech). Consider occult infection (see fever below), consider rare neurological disorder (LP done 4/29), consider prolonged delirium, and consider progressive dementia.  - Appreciate Psychiatry consult   - Reconsult psychiatry 4/28 appreciated   - Reconsult psychiatry 4/30  - Holding seroquel  - Resumed depakote  - Appreciate Neurology consult  - Follow-up paraneoplastic panel, TPO ab, thyroglobulin ab  - Follow up full LP results  - Delirium precautions  - Neurology, Psychology, SLP following    Intermittent fever w/ leukocytosis, unclear etiology  Acute hypoxic hypercarbic respiratory failure, resolved  Concern for aspiration PNA vs pneumonitis  Sepsis w/ elevated lactate, resolved  Respiratory acidosis w/ metabolic compensation, resolved  RRT called early morning on 4/20/25 due to somnolence with development of congested cough, hypoxia to 84%, and borderline fever. Briefly required BiPAP and quickly weaned to O2 NC. CXR without opacity, although some concern for acute aspiration event in setting of suspected medication-mediated somnolence as above.  Now weaned off of supplemental oxygen with intermittent fevers and leukocytosis of unclear etiology.  UA noninfectious, no hemodynamic changes, no ongoing respiratory symptoms, COVID/flu negative, CXR stable, pressure injury noted without concern for infection, no other skin changes, and MRI without acute change. LFTs mildly elevated but no apparent pain with deep abdominal palpation. No rigidity on exam and no new medications. Initially considered LP to rule out meningitis, although no nuchal rigidity and mental status now improving. Overall unclear exact etiology of fevers and challenging assessment given underlying dementia.  - O2 goal >88%, currently on RA  -  Continue zosyn, plan empiric 10 days ending 4/29--last dose this afternoon   + Follow-up cultures, remain NGTD  - LP as above  - Resume lasix 4/29  - Continue holding psychoactive medications  - Will consult ID to evaluate for occult infection 4/30    Elevated LFTs, stable  Unclear etiology of mild elevation, no history of liver disease or failure. Possibly in setting of statin vs depakote use. Bili normal and no other culprit medication.  - Continue holding statin and depakote  - Repeat LFT in AM    Hyperparathyroidism with hypercalcemia  Ongoing hypercalcemia noted despite cinacalcet administration (held 4/23 to 4/26). Low suspicion that mild hypercalcemia is driving mental status changes. Repeat PTH is slightly elevated to 73 this admission. Ionized calcium is elevated  - Resume lasix 4/29  - Daily BMP  - Consult nephrology 4/30 for elevating calcium despite cinacalcet   - Continue cinacalcet, increased to 60mg daily   - Zometa given 5/1  - Will give 500mL bolus 5/1, patient tolerating IV      Deep tissue pressure injury, sacrococcygeal area, hospital-acquired - No signs of infection. WOC RN following  Suspected ADAM - No formal sleep study, although high suspicion. Outpatient sleep center referral on discharge  HTN - Continue PTA amlodipine, clonidine, lisinopril, metoprolol. Hydralazine prn for SBP >180  HLD - Holding statin given liver dysfunction  Hypernatremia, mild, resolved    Clinically Significant Risk Factors          # Hyperchloremia: Highest Cl = 109 mmol/L in last 2 days, will monitor as appropriate       # Hypercalcemia: Highest Ca = 12.6 mg/dL in last 2 days, will monitor as appropriate    # Hypoalbuminemia: Lowest albumin = 2.9 g/dL at 4/28/2025  4:51 AM, will monitor as appropriate     # Hypertension: Noted on problem list     # Acute Hypercapnic Respiratory Failure: based on venous blood gas results.  Continue supplemental oxygen and ventilatory support as indicated.    # Dementia: noted on  problem list            # Financial/Environmental Concerns: none          PT/OT: ordered--likely back to care facility when improved  Diet: Snacks/Supplements Adult: Ensure Enlive; With Meals  Room Service  Combination Diet Safe Tray - NO utensils; Soft and Bite Sized Diet (level 6); Thin Liquids (level 0)    DVT Prophylaxis: Pneumatic Compression Devices  Alberto Catheter: Not present  Lines: None     Cardiac Monitoring: None  Code Status: No CPR- Do NOT Intubate    Medically Ready for Discharge: Anticipated Tomorrow to [facility] facility, discussed with  likely will need multiple DME ordered for facility to accommodate him    Jonatan Simental MD  Hospitalist Service  [facility]  Securely message with [employer] (more info)  Text page via [employer] Paging/Directory     Data reviewed today: I reviewed all new labs and imaging results over the last 24 hours.    Physical Exam   Temp: 98.6  F (37  C) Temp src: Axillary BP: 113/65 Pulse: 65   Resp: 18 SpO2: 95 % O2 Device: Nasal cannula Oxygen Delivery: 2 LPM  There were no vitals filed for this visit.  Vital Signs with Ranges  Temp:  [98.3  F (36.8  C)-99.2  F (37.3  C)] 98.6  F (37  C)  Pulse:  [65-82] 65  Resp:  [16-20] 18  BP: (113-130)/(64-81) 113/65  SpO2:  [92 %-96 %] 95 %  I/O last 3 completed shifts:  In: 470 [P.O.:470]  Out: 750 [Urine:750]  O2 requirements: none    Constitutional: Male appears tired, somnolent  HEENT: Eyes nonicteric  Cardiovascular: RRR, normal S1/2, no m/r/g  Respiratory: CTAB, no wheezing or crackles  Vascular: No LE pitting edema  GI: Normoactive bowel sounds, nontender  Neuro/Psych: A&O to self only nonsensical speech, moves all extremities    Medications   Current Facility-Administered Medications   Medication Dose Route Frequency Provider Last Rate Last Admin    No lozenges or gum should be given while patient on BIPAP/AVAPS/AVAPS AE   Does not apply Continuous PRN Bill Zamarripa APRN CNP        Patient may  continue current oral medications   Does not apply Continuous PRN Bill Zamarripa APRN CNP         Current Facility-Administered Medications   Medication Dose Route Frequency Provider Last Rate Last Admin    amLODIPine (NORVASC) tablet 10 mg  10 mg Oral Daily Troy Farley MD   10 mg at 05/01/25 0857    [Held by provider] atorvastatin (LIPITOR) tablet 40 mg  40 mg Oral Daily Armando Kern MD   40 mg at 04/20/25 2131    cinacalcet (SENSIPAR) tablet 90 mg  90 mg Oral Daily NIKOLAY Gambino MD   90 mg at 05/01/25 0858    cloNIDine (CATAPRES) tablet 0.1 mg  0.1 mg Oral BID Armando Kern MD   0.1 mg at 05/01/25 0856    divalproex sodium extended-release (DEPAKOTE ER) 24 hr tablet 500 mg  500 mg Oral At Bedtime Eduardo Reza PA-C   500 mg at 04/30/25 2139    [Held by provider] enoxaparin ANTICOAGULANT (LOVENOX) injection 40 mg  40 mg Subcutaneous Q12H Troy Farley MD   40 mg at 04/27/25 2040    FLUoxetine (PROzac) capsule 20 mg  20 mg Oral Daily Eduardo Reza PA-C   20 mg at 05/01/25 0856    lisinopril (ZESTRIL) tablet 40 mg  40 mg Oral Daily Troy Farley MD   40 mg at 05/01/25 0857    LORazepam (ATIVAN) injection 1 mg  1 mg Intravenous Once Laura Arriola MD        melatonin tablet 3 mg  3 mg Oral At Bedtime lOeksandr Kong MD   3 mg at 04/30/25 2139    metoprolol succinate ER (TOPROL XL) 24 hr tablet 50 mg  50 mg Oral Daily Armando Kern MD   50 mg at 05/01/25 0857    sennosides (SENOKOT) tablet 1 tablet  1 tablet Oral Daily Armando Kern MD   1 tablet at 05/01/25 0857    sodium chloride (PF) 0.9% PF flush 3 mL  3 mL Intracatheter Q8H Jonatan Simental MD   3 mL at 05/01/25 1343       Data   Recent Labs   Lab 05/01/25  1334 05/01/25  0622 04/30/25  0848 04/29/25  0910 04/28/25  0451 04/27/25  0917   WBC  --   --  15.1*  --  14.3* 13.0*   HGB  --   --  13.1*  --  11.4* 12.1*   MCV  --   --  62*  --  61* 61*   PLT  --   --  559* 410 415 362   NA  --  145 142  --  140 142    POTASSIUM  --  4.2 4.0  --  3.8 3.7   CHLORIDE  --  109* 106  --  108* 109*   CO2  --  27 25  --  22 23   BUN  --  33.9* 26.5*  --  15.6 13.8   CR  --  0.91 0.91 0.88 0.82 0.74   ANIONGAP  --  9 11  --  10 10   UDAY  --  12.6* 12.2*  --  11.2* 10.9*   * 109* 107*  --  98 146*   ALBUMIN  --   --   --   --  2.9* 3.1*   PROTTOTAL  --   --   --   --  6.4 6.6   BILITOTAL  --   --   --   --  0.7 0.7   ALKPHOS  --   --   --   --  131 121   ALT  --   --   --   --  75* 79*   AST  --   --   --   --  62* 71*       Imaging:   No results found for this or any previous visit (from the past 24 hours).

## 2025-05-01 NOTE — PROGRESS NOTES
Diagnosis: Aggressive Behaviors at his Care Facility, Hx of Dementia    POD# NA  Mental Status: NARESH  Activity/dangle Assist of 2 with Lift. Turn and repo  Diet: Soft Bite Diet  Pain: No sign of pain or discomfort   Alberto/Voiding: External Cath  Tele/Restraints/Iso: NA  02/LDA: On 2 L NC. PIV SL  D/C Date: Pending   see note about inability to awaken given zometa talked to hospitalist about iv fluids

## 2025-05-02 ENCOUNTER — APPOINTMENT (OUTPATIENT)
Dept: SPEECH THERAPY | Facility: CLINIC | Age: 65
End: 2025-05-02
Attending: STUDENT IN AN ORGANIZED HEALTH CARE EDUCATION/TRAINING PROGRAM
Payer: MEDICARE

## 2025-05-02 VITALS
DIASTOLIC BLOOD PRESSURE: 78 MMHG | TEMPERATURE: 99 F | SYSTOLIC BLOOD PRESSURE: 120 MMHG | HEART RATE: 60 BPM | RESPIRATION RATE: 18 BRPM | OXYGEN SATURATION: 95 %

## 2025-05-02 LAB
ANION GAP SERPL CALCULATED.3IONS-SCNC: 13 MMOL/L (ref 7–15)
BUN SERPL-MCNC: 37.5 MG/DL (ref 8–23)
CALCIUM SERPL-MCNC: 12.4 MG/DL (ref 8.8–10.4)
CHLORIDE SERPL-SCNC: 112 MMOL/L (ref 98–107)
CREAT SERPL-MCNC: 0.94 MG/DL (ref 0.67–1.17)
EGFRCR SERPLBLD CKD-EPI 2021: 89 ML/MIN/1.73M2
ERYTHROCYTE [DISTWIDTH] IN BLOOD BY AUTOMATED COUNT: 18.7 % (ref 10–15)
GLUCOSE SERPL-MCNC: 103 MG/DL (ref 70–99)
HCO3 SERPL-SCNC: 24 MMOL/L (ref 22–29)
HCT VFR BLD AUTO: 42.9 % (ref 40–53)
HGB BLD-MCNC: 13.4 G/DL (ref 13.3–17.7)
LACTATE SERPL-SCNC: 1 MMOL/L (ref 0.7–2)
LACTATE SERPL-SCNC: 1.6 MMOL/L (ref 0.7–2)
MCH RBC QN AUTO: 19.1 PG (ref 26.5–33)
MCHC RBC AUTO-ENTMCNC: 31.2 G/DL (ref 31.5–36.5)
MCV RBC AUTO: 61 FL (ref 78–100)
PLATELET # BLD AUTO: 603 10E3/UL (ref 150–450)
POTASSIUM SERPL-SCNC: 4.3 MMOL/L (ref 3.4–5.3)
RBC # BLD AUTO: 7.02 10E6/UL (ref 4.4–5.9)
SODIUM SERPL-SCNC: 149 MMOL/L (ref 135–145)
VDRL CSF QL: NON REACTIVE
WBC # BLD AUTO: 14.9 10E3/UL (ref 4–11)

## 2025-05-02 PROCEDURE — 36415 COLL VENOUS BLD VENIPUNCTURE: CPT | Performed by: HOSPITALIST

## 2025-05-02 PROCEDURE — 250N000013 HC RX MED GY IP 250 OP 250 PS 637: Performed by: PHYSICIAN ASSISTANT

## 2025-05-02 PROCEDURE — 250N000013 HC RX MED GY IP 250 OP 250 PS 637: Performed by: STUDENT IN AN ORGANIZED HEALTH CARE EDUCATION/TRAINING PROGRAM

## 2025-05-02 PROCEDURE — 92526 ORAL FUNCTION THERAPY: CPT | Mod: GN

## 2025-05-02 PROCEDURE — 36415 COLL VENOUS BLD VENIPUNCTURE: CPT | Performed by: INTERNAL MEDICINE

## 2025-05-02 PROCEDURE — 250N000013 HC RX MED GY IP 250 OP 250 PS 637: Performed by: INTERNAL MEDICINE

## 2025-05-02 PROCEDURE — 83605 ASSAY OF LACTIC ACID: CPT | Performed by: HOSPITALIST

## 2025-05-02 PROCEDURE — 85027 COMPLETE CBC AUTOMATED: CPT | Performed by: INTERNAL MEDICINE

## 2025-05-02 PROCEDURE — 250N000013 HC RX MED GY IP 250 OP 250 PS 637: Performed by: HOSPITALIST

## 2025-05-02 PROCEDURE — 258N000003 HC RX IP 258 OP 636: Performed by: INTERNAL MEDICINE

## 2025-05-02 PROCEDURE — 82947 ASSAY GLUCOSE BLOOD QUANT: CPT | Performed by: INTERNAL MEDICINE

## 2025-05-02 PROCEDURE — 99231 SBSQ HOSP IP/OBS SF/LOW 25: CPT | Performed by: INTERNAL MEDICINE

## 2025-05-02 PROCEDURE — 120N000001 HC R&B MED SURG/OB

## 2025-05-02 PROCEDURE — 99232 SBSQ HOSP IP/OBS MODERATE 35: CPT | Performed by: INTERNAL MEDICINE

## 2025-05-02 RX ADMIN — DIVALPROEX SODIUM 500 MG: 500 TABLET, FILM COATED, EXTENDED RELEASE ORAL at 21:08

## 2025-05-02 RX ADMIN — MELATONIN TAB 3 MG 3 MG: 3 TAB at 21:08

## 2025-05-02 RX ADMIN — SODIUM CHLORIDE 500 ML: 0.9 INJECTION, SOLUTION INTRAVENOUS at 11:30

## 2025-05-02 RX ADMIN — CLONIDINE HYDROCHLORIDE 0.1 MG: 0.1 TABLET ORAL at 21:08

## 2025-05-02 ASSESSMENT — ACTIVITIES OF DAILY LIVING (ADL)
ADLS_ACUITY_SCORE: 74

## 2025-05-02 NOTE — PROGRESS NOTES
Renal Medicine         Seen 04.30.25 regarding hyperparathyroidism/hypercalcemia    Cinacalcet increased to 90 mg  Diuretic discontinued  Did not receive NS infusion  (Unable to keep IV in place)    Zometa given 05.01.25  500 ml NS given       Modest improvement in Ca  Continue cinacalcet  (Reduce dose should Ca fall < 10.5)    Drink to thirst given hypernatremia      No further recommendations  Call with questions  Will not follow         Recent Labs   Lab 05/02/25  0841   *   POTASSIUM 4.3   CHLORIDE 112*   CO2 24   ANIONGAP 13   *   BUN 37.5*   CR 0.94   GFRESTIMATED 89   UDAY 12.4*           YONAS Gambino    [facility]  [phone]

## 2025-05-02 NOTE — PROGRESS NOTES
"[facility]    Hospitalist Progress Note    Interval History   - Discussed with nurse who trialed Ativan IV challenge on 4/30, patient was very somnolent afterwards--did not respond well to Ativan  - Patient mental status unchanged, generally slightly interactive, nonsensical speech at times  - Awaiting stable calcium before discharge  - Called ex-spouse Clifford and daughter Maria C. Discussed at length with them that patient has continued to decline slowly from a mental standpoint even during this hospitalization. An extensive workup has been negative for any clear diagnoses or infections, other than a rapidly progressive Alzheimer's dementia with atypical features.   Discussed with Care coordinator and then with wife/daughter, in this situation it's difficult to provide recommendations about hospice because the diagnosis remains unclear. If LP results over the next few days are revealing then this can help. But if there is no diagnostic clarity then we cannot provide clear prognosis about his status in 1-6 months; he may improve spontaneously or continue to decline. That being said, given his current mental state and physical debility, I do think that patient has less than 6 months to live and would qualify for hospice.   Clifford and Maria C think that Santos would never have wanted this poor quality of life that he has now, and Clifford stated that Santos would have \"shot himself\" if he saw himself now. Maria C stated that Santos would never want a feeding tube. With this in mind, they both agreed that they would like a little more information about his condition or his prognosis, possibly from a Neurologist, before making a decision about hospice. I will consult Neurology to call family and provide them with their opinion. Discussed with Care Coordinator to discuss more about hospice.    Assessment & Plan   Summary: Estevan Aaron is a 65 year old male with PMH HTN, HLD, hyperparathyroidism, dementia who " was admitted on 4/4/2025 for aggressive behaviors at his care facility.     Acute toxic metabolic encephalopathy, multifactorial, improved  Hospital-acquired delirium  Chronic neuropsychiatric disorder w/ behavioral disturbance, unclear etiology  Reported Alzheimer's dementia  History of prolonged inpatient psychiatric hospitalization 10/2024-late 2/2025.  Prior to admission, patient was ambulating independently at [facility].  Reportedly aggressive behaviors at [facility]. Calm and cooperative with staff in the ED, although significant behavioral disturbance noted early this admission.  Psychiatry involved for assistance with medication titration, however, developed severe encephalopathy with persistent somnolence.  Mental status showed gradual improvement, although worsening somnolence noted on 4/24/2025 compared to prior days. MRI brain 4/24 without acute changes. Of note, patient carries Alzheimer's diagnosis, although significant history of underlying psychiatric disease and concern for paranoid personality disorder. Patient's ex-wife/guardian describes rather accelerated mental status decline beginning in late 2024. At this time, suspect multifactorial encephalopathy 2/2 delirium, psychoactive medication effects, and undifferentiated neuropsychiatric disorder.   Note elevated CRP 62-->21 , ESR 4/27/2025. EEG 4/26 notable for probable ongoing encephalopathy, no seizures.   Lumbar puncture with normal cell counts on 4/29, neg viral CSF PCR. MRI cervical spine without acute abnormalities 4/29.   Etiology of persistent encephalopathy and worsening functional status unclear (now requiring Winnie lift, assist of 2, not following commands and having nonsensical speech). Consider occult infection (see fever below), consider rare neurological disorder (LP done 4/29), consider prolonged delirium, and consider progressive dementia.  - Appreciate Psychiatry consult   - Reconsult psychiatry 4/28 appreciated   -  Reconsult psychiatry 4/30 appreciated, patient failed Ativan challenge (increased somnolence) so this is likely not catatonia  - Holding seroquel  - Resumed depakote  - Appreciate Neurology consult  - Follow-up paraneoplastic panel, TPO ab, thyroglobulin ab  - Follow up full LP results  - [id number] results consistent with Alzheimer's disease  - Delirium precautions  - Neurology, Psychology, SLP following    Concern for aspiration PNA vs pneumonitis  Intermittent fever w/ leukocytosis, unclear etiology, improved  Acute hypoxic hypercarbic respiratory failure, resolved  Sepsis w/ elevated lactate, resolved  Respiratory acidosis w/ metabolic compensation, resolved  RRT called early morning on 4/20/25 due to somnolence with development of congested cough, hypoxia to 84%, and borderline fever. Briefly required BiPAP and quickly weaned to O2 NC. CXR without opacity, although some concern for acute aspiration event in setting of suspected medication-mediated somnolence as above.  Now weaned off of supplemental oxygen with intermittent fevers and leukocytosis of unclear etiology.  UA noninfectious, no hemodynamic changes, no ongoing respiratory symptoms, COVID/flu negative, CXR stable, pressure injury noted without concern for infection, no other skin changes, and MRI without acute change. LFTs mildly elevated but no apparent pain with deep abdominal palpation. No rigidity on exam and no new medications. Initially considered LP to rule out meningitis, although no nuchal rigidity and mental status now improving. Overall unclear exact etiology of fevers and challenging assessment given underlying dementia. LP done on 4/29 without evidence of infection. Completed 10 day course of Zosyn 4/29 with resolution of fevers, persistent leukocytosis, no further treatment recommended by ID.  - O2 goal >88%, currently on RA  - Resumed lasix 4/29  - Appreciate ID input    Elevated LFTs, stable  Unclear etiology of mild elevation, no  history of liver disease or failure. Possibly in setting of statin vs depakote use. Bili normal and no other culprit medication.  - Continue holding statin and depakote    Hyperparathyroidism with hypercalcemia  Hypernatrmeia  Ongoing hypercalcemia noted despite cinacalcet administration (held 4/23 to 4/26). Low suspicion that mild hypercalcemia is driving mental status changes. Repeat PTH is slightly elevated to 73 this admission. Ionized calcium is elevated   Given 500mL bolus 5/1, patient tolerating IV   - Hold lasix  - Repeat 500mL bolus 5/2 due to poor PO intake, hypernatremia  - Consult nephrology 4/30 for elevating calcium despite cinacalcet   - Continue cinacalcet, increased to 60mg daily   - Zometa given 5/1     Deep tissue pressure injury, sacrococcygeal area, hospital-acquired - No signs of infection. WOC RN following  Suspected ADAM - No formal sleep study, although high suspicion. Outpatient sleep center referral on discharge  HTN - Continue PTA amlodipine, clonidine, lisinopril, metoprolol. Hydralazine prn for SBP >180  HLD - Holding statin given liver dysfunction  Hypernatremia, mild, resolved    Clinically Significant Risk Factors         # Hypernatremia: Highest Na = 149 mmol/L in last 2 days, will monitor as appropriate  # Hyperchloremia: Highest Cl = 112 mmol/L in last 2 days, will monitor as appropriate       # Hypercalcemia: Highest Ca = 12.6 mg/dL in last 2 days, will monitor as appropriate    # Hypoalbuminemia: Lowest albumin = 2.9 g/dL at 4/28/2025  4:51 AM, will monitor as appropriate     # Hypertension: Noted on problem list     # Acute Hypercapnic Respiratory Failure: based on venous blood gas results.  Continue supplemental oxygen and ventilatory support as indicated.    # Dementia: noted on problem list            # Financial/Environmental Concerns: none          PT/OT: ordered--likely back to care facility when improved  Diet: Snacks/Supplements Adult: Ensure Enlive; With Meals  Room  Service  Combination Diet Safe Tray - NO utensils; Soft and Bite Sized Diet (level 6); Thin Liquids (level 0)    DVT Prophylaxis: Pneumatic Compression Devices  Alberto Catheter: Not present  Lines: None     Cardiac Monitoring: None  Code Status: No CPR- Do NOT Intubate    Medically Ready for Discharge: Anticipated in 2-4 Days likely to PTA facility, discussed with  likely will need multiple DME ordered for facility to accommodate him    - Total time spent on encounter is 55 minutes, which includes counseling patient and family, coordination of care, managing orders, interpreting tests, and/or time spent discussing with consultants.      Jonatan Simental MD  Hospitalist Service  [facility]  Securely message with [employer] (more info)  Text page via [employer] Paging/Directory     Data reviewed today: I reviewed all new labs and imaging results over the last 24 hours.    Physical Exam   Temp: 97.2  F (36.2  C) Temp src: Oral BP: (!) 146/87 Pulse: 70   Resp: 18 SpO2: 98 % O2 Device: Nasal cannula Oxygen Delivery: 2 LPM  There were no vitals filed for this visit.  Vital Signs with Ranges  Temp:  [97.2  F (36.2  C)-99.4  F (37.4  C)] 97.2  F (36.2  C)  Pulse:  [60-70] 70  Resp:  [18] 18  BP: (113-165)/(65-87) 146/87  SpO2:  [94 %-98 %] 98 %  I/O last 3 completed shifts:  In: 0   Out: 975 [Urine:975]  O2 requirements: none    Constitutional: Male appears tired, somnolent  HEENT: Eyes nonicteric  Cardiovascular: RRR, normal S1/2, no m/r/g  Respiratory: CTAB, no wheezing or crackles  Vascular: No LE pitting edema  GI: Normoactive bowel sounds, nontender  Neuro/Psych: A&O to self only nonsensical speech, moves all extremities    Medications   Current Facility-Administered Medications   Medication Dose Route Frequency Provider Last Rate Last Admin    No lozenges or gum should be given while patient on BIPAP/AVAPS/AVAPS AE   Does not apply Continuous PRN Bill Zamarripa, DILCIA CNP         Patient may continue current oral medications   Does not apply Continuous PRN Bill Zamarripa APRN CNP         Current Facility-Administered Medications   Medication Dose Route Frequency Provider Last Rate Last Admin    amLODIPine (NORVASC) tablet 10 mg  10 mg Oral Daily Troy Farley MD   10 mg at 05/01/25 0857    [Held by provider] atorvastatin (LIPITOR) tablet 40 mg  40 mg Oral Daily Armando Kern MD   40 mg at 04/20/25 2131    cinacalcet (SENSIPAR) tablet 90 mg  90 mg Oral Daily NIKOLAY Gambino MD   90 mg at 05/01/25 0858    cloNIDine (CATAPRES) tablet 0.1 mg  0.1 mg Oral BID Armando Kern MD   0.1 mg at 05/01/25 0856    divalproex sodium extended-release (DEPAKOTE ER) 24 hr tablet 500 mg  500 mg Oral At Bedtime Eduardo Reza PA-C   500 mg at 04/30/25 2139    [Held by provider] enoxaparin ANTICOAGULANT (LOVENOX) injection 40 mg  40 mg Subcutaneous Q12H Troy Farley MD   40 mg at 04/27/25 2040    FLUoxetine (PROzac) capsule 20 mg  20 mg Oral Daily Eduardo Reza PA-C   20 mg at 05/01/25 0856    lisinopril (ZESTRIL) tablet 40 mg  40 mg Oral Daily Troy Farley MD   40 mg at 05/01/25 0857    LORazepam (ATIVAN) injection 1 mg  1 mg Intravenous Once Laura Arriola MD        melatonin tablet 3 mg  3 mg Oral At Bedtime Oleksandr Kong MD   3 mg at 04/30/25 2139    metoprolol succinate ER (TOPROL XL) 24 hr tablet 50 mg  50 mg Oral Daily Armando Kern MD   50 mg at 05/01/25 0857    sennosides (SENOKOT) tablet 1 tablet  1 tablet Oral Daily Armando Kern MD   1 tablet at 05/01/25 0857    sodium chloride (PF) 0.9% PF flush 3 mL  3 mL Intracatheter Q8H Jonatan Simental MD   3 mL at 05/02/25 0841       Data   Recent Labs   Lab 05/02/25  0841 05/01/25  1334 05/01/25  0622 04/30/25  0848 04/29/25  0910 04/28/25  0451 04/27/25  0917   WBC 14.9*  --   --  15.1*  --  14.3* 13.0*   HGB 13.4  --   --  13.1*  --  11.4* 12.1*   MCV 61*  --   --  62*  --  61* 61*   *  --   --  559*  410 415 362   *  --  145 142  --  140 142   POTASSIUM 4.3  --  4.2 4.0  --  3.8 3.7   CHLORIDE 112*  --  109* 106  --  108* 109*   CO2 24  --  27 25  --  22 23   BUN 37.5*  --  33.9* 26.5*  --  15.6 13.8   CR 0.94  --  0.91 0.91 0.88 0.82 0.74   ANIONGAP 13  --  9 11  --  10 10   UDAY 12.4*  --  12.6* 12.2*  --  11.2* 10.9*   * 105* 109* 107*  --  98 146*   ALBUMIN  --   --   --   --   --  2.9* 3.1*   PROTTOTAL  --   --   --   --   --  6.4 6.6   BILITOTAL  --   --   --   --   --  0.7 0.7   ALKPHOS  --   --   --   --   --  131 121   ALT  --   --   --   --   --  75* 79*   AST  --   --   --   --   --  62* 71*       Imaging:   No results found for this or any previous visit (from the past 24 hours).

## 2025-05-02 NOTE — PLAN OF CARE
Date/Time: 5/2/25 0700 - 1900  Diagnosis:Aggressive behavior  Mental Status: NARESH, does not follow commands  Activity/dangle: Ast of 2 Lift  Diet: Regular diet  Pain: Pt comfortable w/ no signs of mourning or crying  Alberto/Voiding: Incontinent, external cath in place  Tele/Restraints/Iso: N/A  02/LDA: Room air. IV saline locked  D/C Date: TBD  Other Info: 1x NS bolus given per order. Wound care done.

## 2025-05-02 NOTE — PROGRESS NOTES
Care Management Follow Up    Length of Stay (days): 25    Expected Discharge Date: 05/03/2025     Concerns to be Addressed: discharge planning     Patient plan of care discussed at interdisciplinary rounds: Yes    Anticipated Discharge Disposition:                Anticipated Discharge Services:    Anticipated Discharge DME:      Patient/family educated on Medicare website which has current facility and service quality ratings:    Education Provided on the Discharge Plan:    Patient/Family in Agreement with the Plan: yes    Referrals Placed by CM/SW:    Private pay costs discussed: Not applicable    Discussed  Partnership in Safe Discharge Planning  document with patient/family: No     Handoff Completed: No, handoff not indicated or clinically appropriate    Additional Information:  Writer informed that Sharon has questions about hospice. Call placed to Sharon and VM left with contact information.    Next Steps: follow for discharge planning    ZOFIA Myers

## 2025-05-02 NOTE — PLAN OF CARE
Diagnosis: Aggressive behavior at memory care  POD#: N/A  Mental Status: Alert but not oriented, does not follow commands.   Activity/dangle Ax 2-3 for turns and repo  Diet: Soft and bite sized diet, thin liquids, total feed  Pain: no signs of pain noted  Alberto/Voiding: Incontinent of B&B, External catheter  Tele/Restraints/Iso: N/A  02/LDA: Room air, R and L IV SL   D/C Date: TBD  Other Info: hx of dementia w/ agitation. Patient somnolent most of the shift. 500 ml bolus given during this shift.

## 2025-05-02 NOTE — PLAN OF CARE
Goal Outcome Evaluation:      Date/Time: 5/1/2025 [id number]     Trauma/Ortho/Medical (Choose one) Medical     Diagnosis: Aggressive behavior  POD#: NA  Mental Status: Alert to self  Activity/dangle: A 2 lift  Diet: Soft bite size   Pain: NA  Alberto/Voiding: incontinent  Tele/Restraints/Iso:/ external catheter  02/LDA: 2lpm  NC / PIV sl  D/C Date: TBD

## 2025-05-02 NOTE — PROGRESS NOTES
Care Management Follow Up    Length of Stay (days): 25    Expected Discharge Date: 05/03/2025     Concerns to be Addressed: discharge planning     Patient plan of care discussed at interdisciplinary rounds: Yes    Anticipated Discharge Disposition:                Anticipated Discharge Services:    Anticipated Discharge DME:      Patient/family educated on Medicare website which has current facility and service quality ratings:    Education Provided on the Discharge Plan:    Patient/Family in Agreement with the Plan: yes    Referrals Placed by CM/SW:    Private pay costs discussed:  Cost of Kay chair    Discussed  Partnership in Safe Discharge Planning  document with patient/family: No     Handoff Completed: No, handoff not indicated or clinically appropriate    Additional Information:  Writer called patient's daughter and co-guardian, Maria C and informed her that the  facility is requiring a hospital  bed, Winnie lift and Kay chair for Santos to return to them.   Writer explained the Kay Chair will need to be paid OOP, then family/guardians can submit bill  to [employer] for reimbursement.  Maria C stated she is on the road, moving back  to [place] and she cannot answer to this right now.  She requested writer call her mother regarding the equipment and dicharge plan.  Writer agreed and placed a call  to Clifford (ex-wife and co-guardian); the call went to  so writer left a  explaining briefly, there is equi needed for return to  andn plan is for discharge MOnday.  Left number for call  back.    Next Steps: Wait for guardian to call back.  She will need to order the Kay Chair and  pay for it.    Kelsey Fernandez RN

## 2025-05-03 LAB
ANION GAP SERPL CALCULATED.3IONS-SCNC: 10 MMOL/L (ref 7–15)
BUN SERPL-MCNC: 30.2 MG/DL (ref 8–23)
CALCIUM SERPL-MCNC: 11.6 MG/DL (ref 8.8–10.4)
CHLORIDE SERPL-SCNC: 115 MMOL/L (ref 98–107)
CREAT SERPL-MCNC: 0.81 MG/DL (ref 0.67–1.17)
EGFRCR SERPLBLD CKD-EPI 2021: >90 ML/MIN/1.73M2
ERYTHROCYTE [SEDIMENTATION RATE] IN BLOOD BY WESTERGREN METHOD: 35 MM/HR (ref 0–20)
GLUCOSE SERPL-MCNC: 98 MG/DL (ref 70–99)
HCO3 SERPL-SCNC: 25 MMOL/L (ref 22–29)
MISCELLANEOUS TEST 1 (ARUP): NORMAL
POTASSIUM SERPL-SCNC: 4.3 MMOL/L (ref 3.4–5.3)
SODIUM SERPL-SCNC: 150 MMOL/L (ref 135–145)
VALPROATE SERPL-MCNC: 12.3 UG/ML

## 2025-05-03 PROCEDURE — 250N000013 HC RX MED GY IP 250 OP 250 PS 637: Performed by: INTERNAL MEDICINE

## 2025-05-03 PROCEDURE — 36415 COLL VENOUS BLD VENIPUNCTURE: CPT | Performed by: PSYCHIATRY & NEUROLOGY

## 2025-05-03 PROCEDURE — 250N000013 HC RX MED GY IP 250 OP 250 PS 637: Performed by: STUDENT IN AN ORGANIZED HEALTH CARE EDUCATION/TRAINING PROGRAM

## 2025-05-03 PROCEDURE — 36415 COLL VENOUS BLD VENIPUNCTURE: CPT | Performed by: INTERNAL MEDICINE

## 2025-05-03 PROCEDURE — 250N000013 HC RX MED GY IP 250 OP 250 PS 637: Performed by: PHYSICIAN ASSISTANT

## 2025-05-03 PROCEDURE — 80048 BASIC METABOLIC PNL TOTAL CA: CPT | Performed by: INTERNAL MEDICINE

## 2025-05-03 PROCEDURE — 80164 ASSAY DIPROPYLACETIC ACD TOT: CPT | Performed by: PSYCHIATRY & NEUROLOGY

## 2025-05-03 PROCEDURE — 258N000003 HC RX IP 258 OP 636: Performed by: INTERNAL MEDICINE

## 2025-05-03 PROCEDURE — 250N000013 HC RX MED GY IP 250 OP 250 PS 637: Performed by: HOSPITALIST

## 2025-05-03 PROCEDURE — 99232 SBSQ HOSP IP/OBS MODERATE 35: CPT | Performed by: INTERNAL MEDICINE

## 2025-05-03 PROCEDURE — 85652 RBC SED RATE AUTOMATED: CPT | Performed by: INTERNAL MEDICINE

## 2025-05-03 PROCEDURE — 120N000001 HC R&B MED SURG/OB

## 2025-05-03 RX ORDER — DIVALPROEX SODIUM 125 MG/1
500 CAPSULE, COATED PELLETS ORAL ONCE
Status: COMPLETED | OUTPATIENT
Start: 2025-05-03 | End: 2025-05-03

## 2025-05-03 RX ORDER — DEXTROSE MONOHYDRATE 50 MG/ML
INJECTION, SOLUTION INTRAVENOUS CONTINUOUS
Status: ACTIVE | OUTPATIENT
Start: 2025-05-03 | End: 2025-05-03

## 2025-05-03 RX ADMIN — CLONIDINE HYDROCHLORIDE 0.1 MG: 0.1 TABLET ORAL at 21:00

## 2025-05-03 RX ADMIN — DIVALPROEX SODIUM 500 MG: 125 CAPSULE, COATED PELLETS ORAL at 23:54

## 2025-05-03 RX ADMIN — LISINOPRIL 40 MG: 40 TABLET ORAL at 08:46

## 2025-05-03 RX ADMIN — METOPROLOL SUCCINATE 50 MG: 50 TABLET, EXTENDED RELEASE ORAL at 08:46

## 2025-05-03 RX ADMIN — AMLODIPINE BESYLATE 10 MG: 10 TABLET ORAL at 08:46

## 2025-05-03 RX ADMIN — MELATONIN TAB 3 MG 3 MG: 3 TAB at 21:00

## 2025-05-03 RX ADMIN — SENNOSIDES 1 TABLET: 8.6 TABLET ORAL at 08:46

## 2025-05-03 RX ADMIN — CINACALCET 90 MG: 30 TABLET ORAL at 08:46

## 2025-05-03 RX ADMIN — DEXTROSE MONOHYDRATE: 50 INJECTION, SOLUTION INTRAVENOUS at 16:16

## 2025-05-03 RX ADMIN — FLUOXETINE HYDROCHLORIDE 20 MG: 20 CAPSULE ORAL at 08:46

## 2025-05-03 RX ADMIN — CLONIDINE HYDROCHLORIDE 0.1 MG: 0.1 TABLET ORAL at 08:46

## 2025-05-03 ASSESSMENT — ACTIVITIES OF DAILY LIVING (ADL)
ADLS_ACUITY_SCORE: 82
ADLS_ACUITY_SCORE: 82
ADLS_ACUITY_SCORE: 74
ADLS_ACUITY_SCORE: 82
ADLS_ACUITY_SCORE: 78
ADLS_ACUITY_SCORE: 82

## 2025-05-03 NOTE — PROGRESS NOTES
[facility]    Hospitalist Progress Note    Date of Admission: 4/4/2025    Assessment & Plan   Estevan Aaron is a 65 year old male with PMHx of hypertension, hyperlipidemia, hyperparathyroidism and dementia who was admitted on 4/4/2025 with aggressive behaviors at his care facility.     Acute toxic metabolic encephalopathy, multifactorial  Hospital-acquired delirium  Chronic neuropsychiatric disorder with behavioral disturbance, unclear etiology  Alzheimer's dementia  *Patient carries diagnosis of Alzheimer's dementia, although has significant history of underlying psychiatric disease and concern for paranoid personality disorder. Patient's ex-wife/guardian describes a rather accelerated mental status decline beginning in late 2024. Had a prolonged inpatient psychiatric hospitalization 10/2024-2/2025.  At that time, suspected multifactorial encephalopathy dt delirium, psychoactive medication effects, and undifferentiated neuropsychiatric disorder.  *Prior to admission, patient was ambulating independently at [facility].  *Reportedly exhibited aggressive behaviors at [facility], prompting referral to ED for evaluation  *In the ED, patient was calm and cooperative with staff, although has since had significant behavioral disturbances after admission.   *Has been seen by psych this stay, assisted with medication titration, however, patient developed severe encephalopathy with persistence somnolence.  *Had a period of transient improvement in his mentation which was followed by subsequent decline with worsening somnolence noted on 4/24.  *Etiology of persistent encephalopathy and worsening functional status unclear (began requiring Winnie lift, assist of 2; not following commands; had nonsensical speech). Considered occult infection (see fever below), consider rare neurological disorder (LP done 4/29), consider prolonged delirium, and consider progressive dementia.  *Additional  neurologic workup pursued.  MRI of brain this day was negative for acute changes. Mildly elevated ESR/CRP this stay. Thyroid studies generally unremarkable. EEG obtained 4/26 was consistent with encephalopathy, no seizures. LP done 4/29, was negative for CNS infection. MRI cervical spine negative on 4/29.   *Alzheimer disease evaluation done on CSF ([id number]) and was elevated -- consistent with Alzheimer's.  *Psych attempted an Ativan challenge for treatment of possible catatonia, this resulted in increased somnolence making catatonia less likely culprit.  -- ongoing medication adjustments per psych  -- paraneoplastic panel pending but findings now seem suggestive of underlying Alzheimer's dementia and superimposed delirium?  -- conts on Depakote  -- cont holding Seroquel  -- cont delirium precautions  -- await additional input per neuro in regards to prognosis, patient's family hoping to get input from them as they are considering transition to hospice    Concern for aspiration PNA vs pneumonitis: Treated  Intermittent fever w/ leukocytosis, unclear etiology: Improved  Acute hypoxic hypercarbic respiratory failure: Resolved  Sepsis w/ elevated lactate: Resolved  Respiratory acidosis w/ metabolic compensation: Resolved  *RRT called 4/20 AM for somnolence, congested cough, hypoxia to 84%, and borderline fever. Briefly required BiPAP and quickly weaned to O2 NC. CXR without opacity, although some concern for acute aspiration event in setting of suspected medication-mediated somnolence as above.  Was able to quickly wean off O2. Intermittent fevers and leukocytosis of unclear etiology.  UA noninfectious, no ongoing respiratory symptoms, COVID/flu negative, CXR stable, pressure injury noted without concern for infection, no other skin changes, and MRI without acute change. LFTs mildly elevated but no apparent pain with deep abdominal palpation. No new medications. Overall unclear exact etiology of fevers and  challenging assessment given underlying dementia.  Completed 10 day course of empiric Zosyn 4/29 with resolution of fevers, persistent leukocytosis, no further treatment recommended by ID.    Abnormal transaminases: Stable  *AST/ALT intermittently elevated this stay. Total bili, alk phos nl.   *Holding statin. Monitor labs periodically    Hyperparathyroidism with hypercalcemia  Hypernatremia  *Ongoing hypercalcemia noted this stay despite cinacalcet administration (held 4/23-4/26). Low suspicion that mild hypercalcemia is driving mental status changes. Repeat PTH slightly elevated to 73 this admission. Ionized calcium is elevated.  *Nephrology consulted 4/30 for high calcium despite cinacalcet. Cinacalcet increased, given Zometa x1 on 5/1.   *Given IVF bolus on 5/1-5/2 dt poor po intake. Lasix held.   -- Na conts trending up -- to 150 on 5/3, will start D5W @100ml/h for total of 500ml    Deep tissue pressure injury, sacrococcygeal area, hospital-acquired   *No signs of infection. WOC RN following    Suspected ADAM   *No formal sleep study, although high suspicion. Outpatient sleep center referral on discharge    Hypertension  Hyperlipidemia  *PTA meds: amlodipine, clonidine, lisinopril, metoprolol, statin.   -- conts on BP meds, statin on hold dt abnl LFTs    FEN: IVF bolus as above, lytes otherwise stable, bite sized diet (level 6) with thin liquids (level 0)  DVT Prophylaxis: PCDs  Code Status: No CPR- Do NOT Intubate    Disposition: Discharge date unclear, pending continued evaluation and discussions with family    Medically Ready for Discharge: Anticipated in 5+ Days      Monse Lawson, DO    Clinically Significant Risk Factors         # Hypernatremia: Highest Na = 150 mmol/L in last 2 days, will monitor as appropriate  # Hyperchloremia: Highest Cl = 115 mmol/L in last 2 days, will monitor as appropriate       # Hypercalcemia: Highest Ca = 12.4 mg/dL in last 2 days, will monitor as appropriate    #  "Hypoalbuminemia: Lowest albumin = 2.9 g/dL at 4/28/2025  4:51 AM, will monitor as appropriate     # Hypertension: Noted on problem list     # Acute Hypercapnic Respiratory Failure: based on venous blood gas results.  Continue supplemental oxygen and ventilatory support as indicated.  # Dementia: noted on problem list            # Financial/Environmental Concerns: none           Medical Decision Making       Please see A&P for additional details of medical decision making.           Interval History   Chart reviewed. Seen this afternoon. Resting comfortably. Doesn't make direct eye contact with me and does not directly respond to questions. Intermittently able to follow few simple commands (\"squeeze my hand\"). Discussed with bedside RN, no acute concerns.     -Data reviewed today: I reviewed all new labs and imaging results over the last 24 hours. I personally reviewed no images or EKG's today.    Physical Exam   Temp: 98.7  F (37.1  C) Temp src: Axillary BP: (!) 144/84 Pulse: 72   Resp: 16 SpO2: 92 % O2 Device: None (Room air) Oxygen Delivery: 1 LPM  There were no vitals filed for this visit.  Vital Signs with Ranges  Temp:  [98.5  F (36.9  C)-99.6  F (37.6  C)] 98.7  F (37.1  C)  Pulse:  [68-75] 72  Resp:  [16-18] 16  BP: (128-148)/(71-87) 144/84  SpO2:  [92 %-95 %] 92 %  I/O last 3 completed shifts:  In: 90 [P.O.:90]  Out: 1000 [Urine:1000]    Constitutional: Resting comfortably, alert but doesn't directly engage with me or answer questions, NAD  Respiratory: CTAB, no wheeze, no increased work of breathing  Cardiovascular: HRRR, no MGR, no LE edema  GI: S, NT, ND, +BS  Skin/Integumen: warm/dry  Other:      Medications   Current Facility-Administered Medications   Medication Dose Route Frequency Provider Last Rate Last Admin    dextrose 5% infusion   Intravenous Continuous Monse Lawson DO         Current Facility-Administered Medications   Medication Dose Route Frequency Provider Last Rate Last Admin "    amLODIPine (NORVASC) tablet 10 mg  10 mg Oral Daily Troy Farley MD   10 mg at 05/03/25 0846    cinacalcet (SENSIPAR) tablet 90 mg  90 mg Oral Daily NIKOLAY Gambino MD   90 mg at 05/03/25 0846    cloNIDine (CATAPRES) tablet 0.1 mg  0.1 mg Oral BID Armando Kern MD   0.1 mg at 05/03/25 0846    divalproex sodium extended-release (DEPAKOTE ER) 24 hr tablet 500 mg  500 mg Oral At Bedtime Eduardo Reza PA-C   500 mg at 05/02/25 2108    [Held by provider] enoxaparin ANTICOAGULANT (LOVENOX) injection 40 mg  40 mg Subcutaneous Q12H Troy Farley MD   40 mg at 04/27/25 2040    FLUoxetine (PROzac) capsule 20 mg  20 mg Oral Daily Eduardo Reza PA-C   20 mg at 05/03/25 0846    lisinopril (ZESTRIL) tablet 40 mg  40 mg Oral Daily Troy Farley MD   40 mg at 05/03/25 0846    LORazepam (ATIVAN) injection 1 mg  1 mg Intravenous Once Laura Arriola MD        melatonin tablet 3 mg  3 mg Oral At Bedtime Oleksandr Kong MD   3 mg at 05/02/25 2108    metoprolol succinate ER (TOPROL XL) 24 hr tablet 50 mg  50 mg Oral Daily Armando Kern MD   50 mg at 05/03/25 0846    sennosides (SENOKOT) tablet 1 tablet  1 tablet Oral Daily Armando Kern MD   1 tablet at 05/03/25 0846    sodium chloride (PF) 0.9% PF flush 3 mL  3 mL Intracatheter Q8H Jonatan Simental MD   3 mL at 05/03/25 0849       Data   Recent Labs   Lab 05/03/25  1147 05/02/25  0841 05/01/25  1334 05/01/25  0622 04/30/25  0848 04/29/25  0910 04/28/25  0451 04/27/25  0917   WBC  --  14.9*  --   --  15.1*  --  14.3* 13.0*   HGB  --  13.4  --   --  13.1*  --  11.4* 12.1*   MCV  --  61*  --   --  62*  --  61* 61*   PLT  --  603*  --   --  559* 410 415 362   * 149*  --  145 142  --  140 142   POTASSIUM 4.3 4.3  --  4.2 4.0  --  3.8 3.7   CHLORIDE 115* 112*  --  109* 106  --  108* 109*   CO2 25 24  --  27 25  --  22 23   BUN 30.2* 37.5*  --  33.9* 26.5*  --  15.6 13.8   CR 0.81 0.94  --  0.91 0.91 0.88 0.82 0.74   ANIONGAP 10 13  --  9 11  --   10 10   UDAY 11.6* 12.4*  --  12.6* 12.2*  --  11.2* 10.9*   GLC 98 103* 105* 109* 107*  --  98 146*   ALBUMIN  --   --   --   --   --   --  2.9* 3.1*   PROTTOTAL  --   --   --   --   --   --  6.4 6.6   BILITOTAL  --   --   --   --   --   --  0.7 0.7   ALKPHOS  --   --   --   --   --   --  131 121   ALT  --   --   --   --   --   --  75* 79*   AST  --   --   --   --   --   --  62* 71*       No results found for this or any previous visit (from the past 24 hours).

## 2025-05-03 NOTE — PLAN OF CARE
Date/Time 5/3/25  [id number]    Trauma/Ortho/Medical (Choose one) Medical    Diagnosis: Aggressive behavior  Mental Status: NARESH, does not follow commands  Activity/dangle: Assist x2, lift, turn/repo  Diet: Soft bite-size  Pain: PAINAD 0  Alberto/Voiding: external catheter  02/LDA: IV SLLAURA  D/C Date: TBD, 5/5/25 potentially  Other Info: Elevated BP, pills one at a time with apple sauce

## 2025-05-03 NOTE — CONSULTS
"Neurology follow-up: 05/03/25    Patient admitted with acute worsening of mental status on chronic cognitive decline.   He is 65 years old with possible Alzheimer's dementia. Neurology consulted for the acute worsening.    Interval history and investigations   Maria C his POA, endorses he was previously interactive, conversant and mobile, eating and going through pictures but memory impaired. He then became agitated and it was a drastic change from his baseline. He was admitted for medication adjustment and seroquel and depakote were added and he became much less interactive than his baseline.    Family is hesitating proceeding with a hospice admission while testing which could change prognosis is pending. They understand that moderate hypercalcemia is currently being managed as part of that plan.    Reviewed MRI brain with cerebral atrophy present, no other findings to explain the mental status changes  Examination   Vitals: BP (!) 144/84 (BP Location: Right arm, Patient Position: Semi-Mendoza's, Cuff Size: Adult Large)   Pulse 72   Temp 98.7  F (37.1  C) (Axillary)   Resp 16   SpO2 92%   General: Adult  patient, lying in bed, NAD  Neuro:  Mental status: Awake, alert, does not focus on examiner but does track his own image on a cell phone, rarely verbal, and said \"no\" when asked \"do you have any pain?\" Follows commands inconsistently  Cranial nerves: blinks to threat in all 4 quadrants, PERRL, conjugate gaze, EOMI, face asymmetry could not be appreciate, no abnormal facial movements  Motor:Paratonia, raises arms and legs and full strength in biceps, triceps, hip flexors, knee extensors and knee flexors  Reflexes: Normoreflexic and symmetric biceps, brachioradialis, triceps, patellae. These are difficult to obtain as he has paratonia limiting the examination.     ASSESSMENT     Encephalopathy, superimposed on probable Alzheimer's disease  2. Hypercalcemia  3. Depakote started in 4/7/2025, started by psychiatry to " help with increased agitation, now concerned that he is too encephalopathic  4. Elevated CRP/ESR from 4/27/2025  5. Elevated transaminases, AST from 4/20/2025 and now ALT from 4/26/2025    I discussed the LP Alzheimer's testing with the patient's guardian, Maria C Rangel. Discussed history per HPI. Concerned for superimposed acute encephalopathy on a chronic dementia and discussed that this can be from toxic/metabolic or infectious causes. Primary service is still working to treat hypercalcemia which may be contributory. Now in the past 2 days, there had been a new thrombocytosis and ongoing leukocytosis attributed to aspiration pneumonitis. She would like to ensure there are no reversible causes for the acute mental status change prior to him discharging to hospice.    RECOMMENDATIONS   Depakote level (must be a trough level -obtain before the next dose is given)  Will order repeat ammonia level in setting of elevating transaminases  Agree with treating hypercalcemia  Continue evaluation for new thrombocytosis  Will continue to follow LP results for any positive findings which would     Continue to provide reorientation, reassurance, and stimulation during the day with lights on, blinds open, and the television on alternating with dark, quiet environment at night.  Interruptions during the night should be limited as much as possible.    I have discussed the above details with Mr. Aaron's guardian at great length and they expressed understanding.  They seemed satisfied with this conversation, and had no further queries as of now.      Thank you for allowing me to participate in Mr. Aaron's care.       Zulma Guzmán MD   Neurologist, [facility]  May 3, 2025 3:30 PM      A total time of 50 minutes was spent on the care of this patient on the date of the visit, outside of time spent performing any procedures. Please excuse any typographical errors, as this note was  generated using a Voice Recognition Software.

## 2025-05-04 LAB
AMMONIA PLAS-SCNC: 18 UMOL/L (ref 16–60)
ANION GAP SERPL CALCULATED.3IONS-SCNC: 13 MMOL/L (ref 7–15)
BACTERIA CSF CULT: NO GROWTH
BUN SERPL-MCNC: 34.3 MG/DL (ref 8–23)
CALCIUM SERPL-MCNC: 11 MG/DL (ref 8.8–10.4)
CHLORIDE SERPL-SCNC: 111 MMOL/L (ref 98–107)
CREAT SERPL-MCNC: 0.9 MG/DL (ref 0.67–1.17)
EGFRCR SERPLBLD CKD-EPI 2021: >90 ML/MIN/1.73M2
ERYTHROCYTE [DISTWIDTH] IN BLOOD BY AUTOMATED COUNT: 18.5 % (ref 10–15)
GLUCOSE BLDC GLUCOMTR-MCNC: 122 MG/DL (ref 70–99)
GLUCOSE SERPL-MCNC: 111 MG/DL (ref 70–99)
GRAM STAIN RESULT: NORMAL
GRAM STAIN RESULT: NORMAL
HCO3 SERPL-SCNC: 25 MMOL/L (ref 22–29)
HCT VFR BLD AUTO: 41.1 % (ref 40–53)
HGB BLD-MCNC: 12.6 G/DL (ref 13.3–17.7)
MCH RBC QN AUTO: 19.1 PG (ref 26.5–33)
MCHC RBC AUTO-ENTMCNC: 30.7 G/DL (ref 31.5–36.5)
MCV RBC AUTO: 62 FL (ref 78–100)
PLAT MORPH BLD: NORMAL
PLATELET # BLD AUTO: 523 10E3/UL (ref 150–450)
POTASSIUM SERPL-SCNC: 3.9 MMOL/L (ref 3.4–5.3)
RBC # BLD AUTO: 6.59 10E6/UL (ref 4.4–5.9)
RBC MORPH BLD: NORMAL
SODIUM SERPL-SCNC: 149 MMOL/L (ref 135–145)
WBC # BLD AUTO: 12.5 10E3/UL (ref 4–11)

## 2025-05-04 PROCEDURE — 258N000003 HC RX IP 258 OP 636: Performed by: INTERNAL MEDICINE

## 2025-05-04 PROCEDURE — 250N000013 HC RX MED GY IP 250 OP 250 PS 637: Performed by: STUDENT IN AN ORGANIZED HEALTH CARE EDUCATION/TRAINING PROGRAM

## 2025-05-04 PROCEDURE — 250N000013 HC RX MED GY IP 250 OP 250 PS 637: Performed by: INTERNAL MEDICINE

## 2025-05-04 PROCEDURE — 99232 SBSQ HOSP IP/OBS MODERATE 35: CPT | Performed by: INTERNAL MEDICINE

## 2025-05-04 PROCEDURE — 250N000013 HC RX MED GY IP 250 OP 250 PS 637: Performed by: HOSPITALIST

## 2025-05-04 PROCEDURE — 82310 ASSAY OF CALCIUM: CPT | Performed by: INTERNAL MEDICINE

## 2025-05-04 PROCEDURE — 36415 COLL VENOUS BLD VENIPUNCTURE: CPT | Performed by: INTERNAL MEDICINE

## 2025-05-04 PROCEDURE — 120N000001 HC R&B MED SURG/OB

## 2025-05-04 PROCEDURE — 250N000013 HC RX MED GY IP 250 OP 250 PS 637: Performed by: PHYSICIAN ASSISTANT

## 2025-05-04 PROCEDURE — 82140 ASSAY OF AMMONIA: CPT | Performed by: PSYCHIATRY & NEUROLOGY

## 2025-05-04 PROCEDURE — 85027 COMPLETE CBC AUTOMATED: CPT | Performed by: INTERNAL MEDICINE

## 2025-05-04 RX ORDER — DEXTROSE MONOHYDRATE 50 MG/ML
INJECTION, SOLUTION INTRAVENOUS CONTINUOUS
Status: DISCONTINUED | OUTPATIENT
Start: 2025-05-04 | End: 2025-05-06

## 2025-05-04 RX ADMIN — LISINOPRIL 40 MG: 40 TABLET ORAL at 08:58

## 2025-05-04 RX ADMIN — CLONIDINE HYDROCHLORIDE 0.1 MG: 0.1 TABLET ORAL at 21:09

## 2025-05-04 RX ADMIN — SENNOSIDES 1 TABLET: 8.6 TABLET ORAL at 08:58

## 2025-05-04 RX ADMIN — MELATONIN TAB 3 MG 3 MG: 3 TAB at 21:09

## 2025-05-04 RX ADMIN — FLUOXETINE HYDROCHLORIDE 20 MG: 20 CAPSULE ORAL at 08:58

## 2025-05-04 RX ADMIN — AMLODIPINE BESYLATE 10 MG: 10 TABLET ORAL at 08:58

## 2025-05-04 RX ADMIN — CLONIDINE HYDROCHLORIDE 0.1 MG: 0.1 TABLET ORAL at 08:58

## 2025-05-04 RX ADMIN — METOPROLOL SUCCINATE 50 MG: 50 TABLET, EXTENDED RELEASE ORAL at 08:58

## 2025-05-04 RX ADMIN — DEXTROSE MONOHYDRATE: 50 INJECTION, SOLUTION INTRAVENOUS at 14:23

## 2025-05-04 RX ADMIN — CINACALCET 90 MG: 30 TABLET ORAL at 08:58

## 2025-05-04 ASSESSMENT — ACTIVITIES OF DAILY LIVING (ADL)
ADLS_ACUITY_SCORE: 84
ADLS_ACUITY_SCORE: 82
ADLS_ACUITY_SCORE: 78
ADLS_ACUITY_SCORE: 78
ADLS_ACUITY_SCORE: 84
ADLS_ACUITY_SCORE: 84
ADLS_ACUITY_SCORE: 78
ADLS_ACUITY_SCORE: 84
ADLS_ACUITY_SCORE: 82
ADLS_ACUITY_SCORE: 84
ADLS_ACUITY_SCORE: 84
ADLS_ACUITY_SCORE: 82
ADLS_ACUITY_SCORE: 78
ADLS_ACUITY_SCORE: 82
ADLS_ACUITY_SCORE: 84
ADLS_ACUITY_SCORE: 84
ADLS_ACUITY_SCORE: 82
ADLS_ACUITY_SCORE: 78
ADLS_ACUITY_SCORE: 78
ADLS_ACUITY_SCORE: 84
ADLS_ACUITY_SCORE: 78
ADLS_ACUITY_SCORE: 82
ADLS_ACUITY_SCORE: 84

## 2025-05-04 NOTE — PLAN OF CARE
Date/Time: 5/3/25 0700 - 1900  Diagnosis:Aggressive behavior  Mental Status: NARESH, does not follow commands  Activity/dangle: Ast of 2 Lift  Diet: Regular diet  Pain: Pt comfortable w/ no signs of mourning or crying  Alberto/Voiding: Incontinent B&B  Tele/Restraints/Iso: N/A  02/LDA: Room air. D5 infusing at 100 mL/hr  D/C Date: TBD  Other Info: Wound care done.

## 2025-05-04 NOTE — PROGRESS NOTES
[facility]    Hospitalist Progress Note    Date of Admission: 4/4/2025    Assessment & Plan   Estevan Aaron is a 65 year old male with PMHx of hypertension, hyperlipidemia, hyperparathyroidism and dementia who was admitted on 4/4/2025 with aggressive behaviors at his care facility.     Acute toxic metabolic encephalopathy, multifactorial  Hospital-acquired delirium  Chronic neuropsychiatric disorder with behavioral disturbance, unclear etiology  Alzheimer's dementia  *Patient carries diagnosis of Alzheimer's dementia, although has significant history of underlying psychiatric disease and concern for paranoid personality disorder. Patient's ex-wife/guardian describes a rather accelerated mental status decline beginning in late 2024. Had a prolonged inpatient psychiatric hospitalization 10/2024-2/2025.  At that time, suspected multifactorial encephalopathy dt delirium, psychoactive medication effects, and undifferentiated neuropsychiatric disorder.  *Prior to admission, patient was ambulating independently at [facility].  *Reportedly exhibited aggressive behaviors at [facility], prompting referral to ED for evaluation  *In the ED, patient was calm and cooperative with staff, although has since had significant behavioral disturbances after admission.   *Has been seen by psych this stay, assisted with medication titration, however, patient developed severe encephalopathy with persistence somnolence.  *Had a period of transient improvement in his mentation which was followed by subsequent decline with worsening somnolence noted on 4/24.  *Etiology of persistent encephalopathy and worsening functional status unclear (began requiring Winnie lift, assist of 2; not following commands; had nonsensical speech). Considered occult infection (see fever below), consider rare neurological disorder (LP done 4/29), consider prolonged delirium, and consider progressive dementia.  *Additional  neurologic workup pursued.  MRI of brain this day was negative for acute changes. Mildly elevated ESR/CRP this stay. Thyroid studies generally unremarkable. EEG obtained 4/26 was consistent with encephalopathy, no seizures. LP done 4/29, was negative for CNS infection. MRI cervical spine negative on 4/29.   *Alzheimer's evaluation ([id number]) done on CSF and was elevated -- consistent with Alzheimer's.  *Psych attempted an Ativan challenge for treatment of possible catatonia, this resulted in increased somnolence making catatonia less likely culprit.  -- paraneoplastic panel pending but findings now seem suggestive of underlying Alzheimer's dementia and superimposed delirium?; unsure how much medication effect and electrolyte disturbances may also be contributing  -- discussed with neuro on 5/4 -- will hold Depakote beginning 5/4 and monitor response in coming days  -- cont treatment of hypernatremia/hypercalcemia with D5W infusion  -- cont holding Seroquel  -- cont delirium precautions    Concern for aspiration PNA vs pneumonitis: Treated  Intermittent fever w/ leukocytosis, unclear etiology: Improved  Acute hypoxic hypercarbic respiratory failure: Resolved  Sepsis w/ elevated lactate: Resolved  Respiratory acidosis w/ metabolic compensation: Resolved  *RRT called 4/20 AM for somnolence, congested cough, hypoxia to 84%, and borderline fever. Briefly required BiPAP and quickly weaned to O2 NC. CXR without opacity, although some concern for acute aspiration event in setting of suspected medication-mediated somnolence as above.  Was able to quickly wean off O2. Intermittent fevers and leukocytosis of unclear etiology.  UA noninfectious, no ongoing respiratory symptoms, COVID/flu negative, CXR stable, pressure injury noted without concern for infection, no other skin changes, and MRI without acute change. LFTs mildly elevated but no apparent pain with deep abdominal palpation. No new medications. Overall unclear  exact etiology of fevers and challenging assessment given underlying dementia. Completed 10 day course of empiric Zosyn 4/29 with resolution of fevers, persistent leukocytosis, no further treatment recommended by ID.  -- WBC and sed rate improving  -- monitor CBC periodically    Abnormal transaminases: Stable  *AST/ALT intermittently elevated this stay. Total bili, alk phos nl.   *Holding statin. Monitor labs periodically.    Hyperparathyroidism with hypercalcemia  Hypernatremia  *Ongoing hypercalcemia noted this stay despite cinacalcet administration (held 4/23-4/26). Low suspicion that mild hypercalcemia alone is driving mental status changes. Repeat PTH slightly elevated to 73 this admission. Ionized calcium is elevated.  *Nephrology consulted 4/30 for high calcium despite cinacalcet. Cinacalcet increased, given Zometa x1 on 5/1.   *Given IVF bolus on 5/1-5/2 dt poor po intake. Lasix held. Given 500ml D5W on 5/3.   -- Na and Ca slowly improving, unclear how much to correlate with slight improvement in mentation  -- cont cinacalcet 90mg daily  -- will start D5W @100ml/h  -- repeat BMP in AM    Hypertension  Hyperlipidemia  *PTA meds: amlodipine, clonidine, lisinopril, metoprolol, statin.   -- conts on BP meds; statin on hold dt abnl LFTs    Suspected AADM   *No formal sleep study, although high suspicion. Outpatient sleep center referral on discharge    Deep tissue pressure injury, sacrococcygeal area, hospital-acquired   *No signs of infection. WOC RN following    FEN: IVF bolus as above, lytes otherwise stable, bite sized diet (level 6) with thin liquids (level 0)  DVT Prophylaxis: PCDs  Code Status: No CPR- Do NOT Intubate    Disposition: Family still considering whether or not they want to have patient transition to hospice.  They want to ensure prior to doing so, that any attempts at addressing reversible causes have been done.  With family at bedside during my visit today, we decreasing the dose of Depakote  and seeing how he responds.  They were on board with this plan.  They know the remainder of his otherwise extensive workup has been unrevealing.  Discharge date unclear, pending continued evaluation and discussions with family    Medically Ready for Discharge: Anticipated in 2-4 Days      Monse Lawson, DO    Clinically Significant Risk Factors         # Hypernatremia: Highest Na = 150 mmol/L in last 2 days, will monitor as appropriate  # Hyperchloremia: Highest Cl = 115 mmol/L in last 2 days, will monitor as appropriate       # Hypercalcemia: Highest Ca = 11.6 mg/dL in last 2 days, will monitor as appropriate    # Hypoalbuminemia: Lowest albumin = 2.9 g/dL at 4/28/2025  4:51 AM, will monitor as appropriate     # Hypertension: Noted on problem list     # Acute Hypercapnic Respiratory Failure: based on venous blood gas results.  Continue supplemental oxygen and ventilatory support as indicated.    # Dementia: noted on problem list            # Financial/Environmental Concerns: none           Medical Decision Making       Please see A&P for additional details of medical decision making.           Interval History   Chart reviewed.  Patient seen at bedside this morning.  Numerous family members at bedside including his daughter Maria C and ex-wife Sharon who are his coguardian's.  Patient sitting in bedside chair.  Appears slightly more alert and engaged with family today than when I had seen him yesterday afternoon.  Bedside RN thinks he may be slightly more engaged today.  Although he will look at my direction when I ask him questions, he does not regularly answer questions or verbalize a response.  He will intermittently follow commands.    -Data reviewed today: I reviewed all new labs and imaging results over the last 24 hours. I personally reviewed no images or EKG's today.    Physical Exam   Temp: 97.1  F (36.2  C) Temp src: Axillary BP: 139/81 Pulse: 64   Resp: 16 SpO2: 93 % O2 Device: None (Room air)     There were no vitals filed for this visit.  Vital Signs with Ranges  Temp:  [97.1  F (36.2  C)-97.5  F (36.4  C)] 97.1  F (36.2  C)  Pulse:  [61-66] 64  Resp:  [16] 16  BP: (131-147)/(81-88) 139/81  SpO2:  [91 %-97 %] 93 %  I/O last 3 completed shifts:  In: 300 [P.O.:300]  Out: 500 [Urine:500]    Constitutional: Resting comfortably, alert but doesn't directly engage with me or answer questions, NAD  Respiratory: CTAB, no wheeze, no increased work of breathing  Cardiovascular: HRRR, no MGR, no LE edema  GI: S, NT, ND, +BS  Skin/Integumen: warm/dry  Other:      Medications   Current Facility-Administered Medications   Medication Dose Route Frequency Provider Last Rate Last Admin     Current Facility-Administered Medications   Medication Dose Route Frequency Provider Last Rate Last Admin    amLODIPine (NORVASC) tablet 10 mg  10 mg Oral Daily Troy Farley MD   10 mg at 05/04/25 0858    cinacalcet (SENSIPAR) tablet 90 mg  90 mg Oral Daily NIKOLAY Gambino MD   90 mg at 05/04/25 0858    cloNIDine (CATAPRES) tablet 0.1 mg  0.1 mg Oral BID Armando Kern MD   0.1 mg at 05/04/25 0858    divalproex sodium extended-release (DEPAKOTE ER) 24 hr tablet 500 mg  500 mg Oral At Bedtime Eduardo Reza PA-C   500 mg at 05/02/25 2108    [Held by provider] enoxaparin ANTICOAGULANT (LOVENOX) injection 40 mg  40 mg Subcutaneous Q12H Troy Farley MD   40 mg at 04/27/25 2040    FLUoxetine (PROzac) capsule 20 mg  20 mg Oral Daily Eduardo Reza PA-C   20 mg at 05/04/25 0858    lisinopril (ZESTRIL) tablet 40 mg  40 mg Oral Daily Troy Farley MD   40 mg at 05/04/25 0858    LORazepam (ATIVAN) injection 1 mg  1 mg Intravenous Once Laura Arriola MD        melatonin tablet 3 mg  3 mg Oral At Bedtime Bechard, Oleksandr, MD   3 mg at 05/03/25 2100    metoprolol succinate ER (TOPROL XL) 24 hr tablet 50 mg  50 mg Oral Daily Armando Kern MD   50 mg at 05/04/25 0858    sennosides (SENOKOT) tablet 1 tablet  1 tablet Oral Daily  Armando Kern MD   1 tablet at 05/04/25 0858    sodium chloride (PF) 0.9% PF flush 3 mL  3 mL Intracatheter Q8H Jonatan Simental MD   3 mL at 05/04/25 0900       Data   Recent Labs   Lab 05/04/25  0721 05/04/25  0616 05/03/25  1147 05/02/25  0841 05/01/25  0622 04/30/25  0848 04/29/25  0910 04/28/25  0451   WBC 12.5*  --   --  14.9*  --  15.1*  --  14.3*   HGB 12.6*  --   --  13.4  --  13.1*  --  11.4*   MCV 62*  --   --  61*  --  62*  --  61*   *  --   --  603*  --  559*   < > 415   NA  --  149* 150* 149*   < > 142  --  140   POTASSIUM  --  3.9 4.3 4.3   < > 4.0  --  3.8   CHLORIDE  --  111* 115* 112*   < > 106  --  108*   CO2  --  25 25 24   < > 25  --  22   BUN  --  34.3* 30.2* 37.5*   < > 26.5*  --  15.6   CR  --  0.90 0.81 0.94   < > 0.91   < > 0.82   ANIONGAP  --  13 10 13   < > 11  --  10   UDAY  --  11.0* 11.6* 12.4*   < > 12.2*  --  11.2*   GLC  --  111* 98 103*   < > 107*  --  98   ALBUMIN  --   --   --   --   --   --   --  2.9*   PROTTOTAL  --   --   --   --   --   --   --  6.4   BILITOTAL  --   --   --   --   --   --   --  0.7   ALKPHOS  --   --   --   --   --   --   --  131   ALT  --   --   --   --   --   --   --  75*   AST  --   --   --   --   --   --   --  62*    < > = values in this interval not displayed.       No results found for this or any previous visit (from the past 24 hours).

## 2025-05-04 NOTE — PLAN OF CARE
Date/Time 5/4/25 1900-0730    Diagnosis: Acute toxic metabolic encephalopathy, multifactorial  Hospital-acquired delirium  Chronic neuropsychiatric disorder with behavioral disturbance, unclear etiology  Alzheimer's dementia  Mental Status: NARESH, does not  follow commands  Activity/dangle Assist x2, lift, turn/repo  Diet: Soft bite-size  Pain: PAINAD 0  Alberto/Voiding: external catheter, incontinent of B&B  02/LDA: IV SL, RA  D/C Date: TBD, 5/5/25 potentially  Other Info:  VSS, pills one at a time with apple sauce

## 2025-05-04 NOTE — PLAN OF CARE
Date/Time: 5/4/25 0700 - 1900  Diagnosis:Aggressive behavior  Mental Status: NARESH, does not follow commands  Activity/dangle: Ast of 2 Lift  Diet: Regular diet  Pain: Pt comfortable w/ no signs of mourning or crying  Alberto/Voiding: Incontinent B&B  Tele/Restraints/Iso: N/A  02/LDA: Room air. D5 infusing at 100 mL/hr  D/C Date: TBD  Other Info: Buttocks wound care done.

## 2025-05-05 ENCOUNTER — APPOINTMENT (OUTPATIENT)
Dept: PHYSICAL THERAPY | Facility: CLINIC | Age: 65
DRG: 056 | End: 2025-05-05
Attending: INTERNAL MEDICINE
Payer: MEDICARE

## 2025-05-05 LAB
ALBUMIN SERPL BCG-MCNC: 3.8 G/DL (ref 3.5–5.2)
ALP SERPL-CCNC: 118 U/L (ref 40–150)
ALT SERPL W P-5'-P-CCNC: 66 U/L (ref 0–70)
AMPHIPHYSIN IGG SER QL IA: NEGATIVE
ANION GAP SERPL CALCULATED.3IONS-SCNC: 13 MMOL/L (ref 7–15)
ANNOTATION COMMENT IMP: NORMAL
AST SERPL W P-5'-P-CCNC: 53 U/L (ref 0–45)
BILIRUB SERPL-MCNC: 0.9 MG/DL
BUN SERPL-MCNC: 39.1 MG/DL (ref 8–23)
CALCIUM SERPL-MCNC: 10.2 MG/DL (ref 8.8–10.4)
CHLORIDE SERPL-SCNC: 107 MMOL/L (ref 98–107)
CREAT SERPL-MCNC: 1.03 MG/DL (ref 0.67–1.17)
CV2 AB SERPL QL IF: NEGATIVE
EGFRCR SERPLBLD CKD-EPI 2021: 80 ML/MIN/1.73M2
ELLIPTOCYTES BLD QL SMEAR: SLIGHT
ERYTHROCYTE [DISTWIDTH] IN BLOOD BY AUTOMATED COUNT: 18.3 % (ref 10–15)
GLIAL NUC TYPE 1 AB SER QL IF: NEGATIVE
GLUCOSE SERPL-MCNC: 136 MG/DL (ref 70–99)
HCO3 SERPL-SCNC: 22 MMOL/L (ref 22–29)
HCT VFR BLD AUTO: 39.8 % (ref 40–53)
HGB BLD-MCNC: 12 G/DL (ref 13.3–17.7)
HU1 AB SER QL: NEGATIVE
HU2 AB SER QL IF: NEGATIVE
HU3 AB SER QL: NEGATIVE
MCH RBC QN AUTO: 18.8 PG (ref 26.5–33)
MCHC RBC AUTO-ENTMCNC: 30.2 G/DL (ref 31.5–36.5)
MCV RBC AUTO: 63 FL (ref 78–100)
PARANEOPLASTIC AB SER-IMP: NORMAL
PCA-1 AB SER QL IF: NEGATIVE
PCA-2 AB SER QL IF: NEGATIVE
PCA-TR AB SER QL IF: NEGATIVE
PLAT MORPH BLD: ABNORMAL
PLATELET # BLD AUTO: 454 10E3/UL (ref 150–450)
POTASSIUM SERPL-SCNC: 3.7 MMOL/L (ref 3.4–5.3)
PROT SERPL-MCNC: 7.1 G/DL (ref 6.4–8.3)
RBC # BLD AUTO: 6.37 10E6/UL (ref 4.4–5.9)
RBC MORPH BLD: ABNORMAL
SODIUM SERPL-SCNC: 142 MMOL/L (ref 135–145)
WBC # BLD AUTO: 13.1 10E3/UL (ref 4–11)

## 2025-05-05 PROCEDURE — 120N000001 HC R&B MED SURG/OB

## 2025-05-05 PROCEDURE — 97161 PT EVAL LOW COMPLEX 20 MIN: CPT | Mod: GP

## 2025-05-05 PROCEDURE — 250N000013 HC RX MED GY IP 250 OP 250 PS 637: Performed by: PHYSICIAN ASSISTANT

## 2025-05-05 PROCEDURE — 250N000013 HC RX MED GY IP 250 OP 250 PS 637: Performed by: HOSPITALIST

## 2025-05-05 PROCEDURE — 80053 COMPREHEN METABOLIC PANEL: CPT | Performed by: INTERNAL MEDICINE

## 2025-05-05 PROCEDURE — 85014 HEMATOCRIT: CPT | Performed by: INTERNAL MEDICINE

## 2025-05-05 PROCEDURE — 258N000003 HC RX IP 258 OP 636: Performed by: INTERNAL MEDICINE

## 2025-05-05 PROCEDURE — 97530 THERAPEUTIC ACTIVITIES: CPT | Mod: GP

## 2025-05-05 PROCEDURE — 99232 SBSQ HOSP IP/OBS MODERATE 35: CPT | Performed by: INTERNAL MEDICINE

## 2025-05-05 PROCEDURE — 250N000013 HC RX MED GY IP 250 OP 250 PS 637: Performed by: INTERNAL MEDICINE

## 2025-05-05 PROCEDURE — 36415 COLL VENOUS BLD VENIPUNCTURE: CPT | Performed by: INTERNAL MEDICINE

## 2025-05-05 RX ORDER — MULTIPLE VITAMINS W/ MINERALS TAB 9MG-400MCG
1 TAB ORAL DAILY
Status: DISCONTINUED | OUTPATIENT
Start: 2025-05-05 | End: 2025-05-13 | Stop reason: HOSPADM

## 2025-05-05 RX ADMIN — FLUOXETINE HYDROCHLORIDE 20 MG: 20 CAPSULE ORAL at 07:54

## 2025-05-05 RX ADMIN — MELATONIN TAB 3 MG 3 MG: 3 TAB at 21:40

## 2025-05-05 RX ADMIN — DEXTROSE MONOHYDRATE: 50 INJECTION, SOLUTION INTRAVENOUS at 10:31

## 2025-05-05 RX ADMIN — CLONIDINE HYDROCHLORIDE 0.1 MG: 0.1 TABLET ORAL at 07:55

## 2025-05-05 RX ADMIN — SENNOSIDES 1 TABLET: 8.6 TABLET ORAL at 07:54

## 2025-05-05 RX ADMIN — METOPROLOL SUCCINATE 50 MG: 50 TABLET, EXTENDED RELEASE ORAL at 07:54

## 2025-05-05 RX ADMIN — CINACALCET 90 MG: 30 TABLET ORAL at 07:54

## 2025-05-05 RX ADMIN — CLONIDINE HYDROCHLORIDE 0.1 MG: 0.1 TABLET ORAL at 21:40

## 2025-05-05 RX ADMIN — DEXTROSE MONOHYDRATE: 50 INJECTION, SOLUTION INTRAVENOUS at 00:49

## 2025-05-05 ASSESSMENT — ACTIVITIES OF DAILY LIVING (ADL)
ADLS_ACUITY_SCORE: 78
ADLS_ACUITY_SCORE: 76
ADLS_ACUITY_SCORE: 78
ADLS_ACUITY_SCORE: 76
ADLS_ACUITY_SCORE: 78
ADLS_ACUITY_SCORE: 76
ADLS_ACUITY_SCORE: 76
ADLS_ACUITY_SCORE: 78
ADLS_ACUITY_SCORE: 76
ADLS_ACUITY_SCORE: 78
ADLS_ACUITY_SCORE: 76
ADLS_ACUITY_SCORE: 76
ADLS_ACUITY_SCORE: 78
ADLS_ACUITY_SCORE: 76
ADLS_ACUITY_SCORE: 78
ADLS_ACUITY_SCORE: 78
ADLS_ACUITY_SCORE: 76
ADLS_ACUITY_SCORE: 78
ADLS_ACUITY_SCORE: 76
ADLS_ACUITY_SCORE: 78

## 2025-05-05 NOTE — PLAN OF CARE
Date/Time 5/4/25 1900-0730     Diagnosis: Acute toxic metabolic encephalopathy, multifactorial  Hospital-acquired delirium  Chronic neuropsychiatric disorder with behavioral disturbance, unclear etiology  Alzheimer's dementia  Mental Status: NARESH, does not  follow commands  Activity/dangle Assist x2, lift, turn/repo  Diet: Soft bite-size  Pain: PAINAD 0  Alberto/Voiding: external catheter, incontinent of B&B  02/LDA: IV infusing D5 100ml/hr, RA  D/C Date: TBD, 5/5/25 potentially  Other Info:  VSS, pills one at a time with apple sauce

## 2025-05-05 NOTE — PROGRESS NOTES
CLINICAL NUTRITION SERVICES - REASSESSMENT NOTE      Registered Dietitian Interventions:  multivitamin with minerals to support wound healing, if appropriate  Expedite to support wound healing  Continue Soft and Bite Sized diet, supplements as ordered and encouraging intakes as able     INFORMATION OBTAINED  Patient not available for interview due to Acute toxic metabolic encephalopathy, multifactorial, Hospital-acquired delirium, Chronic neuropsychiatric disorder with behavioral disturbance, and Alzheimer's dementia       CURRENT NUTRITION ORDERS  Diet: Orders Placed This Encounter      Combination Diet Safe Tray - NO utensils; Soft and Bite Sized Diet (level 6); Thin Liquids (level 0)  Snacks/Supplements: Ensure BID w/ meals (prefers chocolate)    Nutrition Support: -    MALNUTRITION:  Visual Nutrition Focused Physical Assessment (NFPA) not completed. Do not suspect muscle/fat losses.  Unable to fully assess for malnutrition due to lack of recent wt trends.      % Weight Loss:  Unable to fully assess   % Intake:  </= 50% for >/= 5 days (severe malnutrition)  Subcutaneous Fat Loss:  Do not suspect muscle/fat losses.  Muscle Loss:  Do not suspect muscle/fat losses.  Fluid Retention:  1-2+ - does not meet criteria.     CURRENT INTAKE/TOLERANCE  5/5 - Flowsheets show a fair appetite and quite variable intakes ranging from 0-4 intakes per day of 0-75%, with a couple 100% intakes    4/28 - Fluctuating intakes are documented, mostly 25-50% intakes in the setting of AMS. Messaged RN about supplement preference - patient has been like the chocolate Ensures. Adjusted supplement order.     4/21 - Discussed with patient's RN. Notes indicate 0-25% of meals consumed, pt sometimes spitting bites back out rather than chewing and swallowing. Diet modified to hopefully make it easier to chew and swallow per SLP.     NEW FINDINGS  - Pt admitted for Acute toxic metabolic encephalopathy, multifactorial. During admission has had poor  mentation and Aggressive behavior. Pt has developed an DTPI, will order a multivitamin with minerals, and Expedite to support wound healing.   Nutrition-relevant labs: Reviewed   Nutrition-relevant medications: Reviewed   GI symptoms: LBM 3-4x on 4/23  Skin/wounds: 4/25 WOCN, Summary: patient with hospital acquired deep tissue pressure injury to sacrococcygeal area, initial woc assessment 4/25   Weight: None taken during this admission    EVALUATION OF THE PROGRESS TOWARD GOALS   Previous Goals  N/a  Evaluation: Unable to meet    Previous Nutrition Diagnosis  Inadequate oral intake related to altered mental status as evidenced by intakes of 25-50% of meals for up to 2 weeks.   Evaluation: No longer applicable, nutrition diagnosis changed below    NUTRITION DIAGNOSIS  Increased nutrient needs protein  related to wound healing as evidenced by active DTPI, need for 1.2 g/kg/d PRO and oral nutrition supplements to help pt meet nutrition needs    INTERVENTIONS  Medical food supplement therapy  Vitamin and mineral supplement therapy    Goals  Wound healing per WOC documentation  PO - >50% meal trays and nutrition supplements TID     Monitoring/Evaluation      Progress toward goals will be monitored and evaluated per policy.

## 2025-05-05 NOTE — PROGRESS NOTES
[facility]    Hospitalist Progress Note    Date of Admission: 4/4/2025    Assessment & Plan   Estevan Aaron is a 65 year old male with PMHx of hypertension, hyperlipidemia, hyperparathyroidism and dementia who was admitted on 4/4/2025 with aggressive behaviors at his care facility.     Acute toxic metabolic encephalopathy, multifactorial  Hospital-acquired delirium  Chronic neuropsychiatric disorder with behavioral disturbance, unclear etiology  Alzheimer's dementia  *Patient carries diagnosis of Alzheimer's dementia, although has significant history of underlying psychiatric disease and concern for paranoid personality disorder. Patient's ex-wife/guardian describes a rather accelerated mental status decline beginning in late 2024. Had a prolonged inpatient psychiatric hospitalization 10/2024-2/2025.  At that time, suspected multifactorial encephalopathy dt delirium, psychoactive medication effects, and undifferentiated neuropsychiatric disorder.  *Prior to admission, patient was ambulating independently at [facility].  *Reportedly exhibited aggressive behaviors at [facility], prompting referral to ED for evaluation  *In the ED, patient was calm and cooperative with staff, although has since had significant behavioral disturbances after admission.   *Has been seen by psych this stay, assisted with medication titration, however, patient developed severe encephalopathy with persistence somnolence.  *Had a period of transient improvement in his mentation which was followed by subsequent decline with worsening somnolence noted on 4/24.  *Etiology of persistent encephalopathy and worsening functional status unclear (began requiring Winnie lift, assist of 2; not following commands; had nonsensical speech). Considered occult infection (see fever below), consider rare neurological disorder (LP done 4/29), consider prolonged delirium, and consider progressive dementia.  *Additional  neurologic workup pursued.  MRI of brain this day was negative for acute changes. Mildly elevated ESR/CRP this stay. Thyroid studies generally unremarkable. EEG obtained 4/26 was consistent with encephalopathy, no seizures. LP done 4/29, was negative for CNS infection. MRI cervical spine negative on 4/29.   *Alzheimer's evaluation ([id number]) done on CSF and was elevated -- consistent with Alzheimer's.  *Psych attempted an Ativan challenge for treatment of possible catatonia, this resulted in increased somnolence making catatonia less likely culprit.  -- paraneoplastic panel pending but findings now seem suggestive of underlying Alzheimer's dementia and superimposed delirium?; unsure how much medication effect and electrolyte disturbances may also be contributing  -- discussed with neuro on 5/4 -- will hold Depakote beginning 5/4 and monitor response in coming days  -- cont treatment of hypernatremia/hypercalcemia with D5W infusion  -- cont holding Seroquel  -- cont delirium precautions    1420 Addendum:  Notified by RN that patient pulled out his IV. Will leave it out for now. Monitor lytes off IVFs.   Later notified that IV access re-established per RN. Will cont IVFs as ordered.     Concern for aspiration PNA vs pneumonitis: Treated  Intermittent fever w/ leukocytosis, unclear etiology: Improved  Acute hypoxic hypercarbic respiratory failure: Resolved  Sepsis w/ elevated lactate: Resolved  Respiratory acidosis w/ metabolic compensation: Resolved  *RRT called 4/20 AM for somnolence, congested cough, hypoxia to 84%, and borderline fever. Briefly required BiPAP and quickly weaned to O2 NC. CXR without opacity, although some concern for acute aspiration event in setting of suspected medication-mediated somnolence as above.  Was able to quickly wean off O2. Intermittent fevers and leukocytosis of unclear etiology.  UA noninfectious, no ongoing respiratory symptoms, COVID/flu negative, CXR stable, pressure injury  noted without concern for infection, no other skin changes, and MRI without acute change. LFTs mildly elevated but no apparent pain with deep abdominal palpation. No new medications. Overall unclear exact etiology of fevers and challenging assessment given underlying dementia. Completed 10 day course of empiric Zosyn 4/29 with resolution of fevers, persistent leukocytosis, no further treatment recommended by ID.  -- WBC and sed rate improving  -- monitor CBC periodically    Abnormal transaminases: Stable  *AST/ALT intermittently elevated this stay. Total bili, alk phos nl.   *Holding statin. Monitor labs periodically.    Hyperparathyroidism with hypercalcemia  Hypernatremia: Improved  *Ongoing hypercalcemia noted this stay despite cinacalcet administration (held 4/23-4/26). Low suspicion that mild hypercalcemia alone is driving mental status changes. Repeat PTH slightly elevated to 73 this admission. Ionized calcium is elevated.  *Nephrology consulted 4/30 for high calcium despite cinacalcet. Cinacalcet increased, given Zometa x1 on 5/1.   *Given IVF bolus on 5/1-5/2 dt poor po intake. Lasix held. Given 500ml D5W on 5/3.   -- Na and Ca slowly improving, unclear how much to correlate with slight improvement in mentation  -- cont cinacalcet 90mg daily  -- cont D5W @100ml/h  -- repeat BMP in AM    Hypertension  Hyperlipidemia  *PTA meds: amlodipine, clonidine, lisinopril, metoprolol, statin.   -- conts on BP meds; statin on hold dt abnl LFTs    Suspected ADAM   *No formal sleep study, although high suspicion. Outpatient sleep center referral on discharge    Deep tissue pressure injury, sacrococcygeal area, hospital-acquired   *No signs of infection. WOC RN following    FEN: IVFs as above, lytes otherwise stable, bite sized diet (level 6) with thin liquids (level 0)  DVT Prophylaxis: PCDs  Code Status: No CPR- Do NOT Intubate    Disposition: Family still considering whether or not they want to have patient transition to  hospice.  They want to ensure prior to doing so, that any attempts at addressing reversible causes have been done but acknowledge the remainder of his otherwise extensive workup has been unrevealing.  Continuing to monitor off Depakote, lytes normalized on D5W. Discharge date unclear, pending continued evaluation and discussions with family    Medically Ready for Discharge: Anticipated in 2-4 Days      Monse Lawson, DO    Clinically Significant Risk Factors         # Hypernatremia: Highest Na = 150 mmol/L in last 2 days, will monitor as appropriate  # Hyperchloremia: Highest Cl = 115 mmol/L in last 2 days, will monitor as appropriate       # Hypercalcemia: Highest Ca = 11.6 mg/dL in last 2 days, will monitor as appropriate    # Hypoalbuminemia: Lowest albumin = 2.9 g/dL at 4/28/2025  4:51 AM, will monitor as appropriate     # Hypertension: Noted on problem list     # Acute Hypercapnic Respiratory Failure: based on venous blood gas results.  Continue supplemental oxygen and ventilatory support as indicated.    # Dementia: noted on problem list            # Financial/Environmental Concerns: none           Medical Decision Making       Please see A&P for additional details of medical decision making.         Interval History   Chart reviewed.  Per bedside RN was more alert this morning and asking to get out of bed, PT referral placed. When I stopped by to see patient he was sleeping in bed, opens eyes to name and following few simple commands but does not make any meaningful attempt to converse with me.     -Data reviewed today: I reviewed all new labs and imaging results over the last 24 hours. I personally reviewed no images or EKG's today.    Physical Exam   Temp: 97.9  F (36.6  C) Temp src: Axillary BP: 103/73 Pulse: 77   Resp: 16 SpO2: 92 % O2 Device: None (Room air)    There were no vitals filed for this visit.  Vital Signs with Ranges  Temp:  [97.4  F (36.3  C)-98.8  F (37.1  C)] 97.9  F (36.6   C)  Pulse:  [59-77] 77  Resp:  [16] 16  BP: (103-137)/(66-81) 103/73  SpO2:  [91 %-95 %] 92 %  I/O last 3 completed shifts:  In: 1003 [I.V.:1003]  Out: 400 [Urine:400]    Constitutional: Resting comfortably, nonverbal but opens eyes to name and following few simple commands, NAD  Respiratory: CTAB, no wheeze, no increased work of breathing  Cardiovascular: HRRR, no MGR, no LE edema  GI: S, NT, ND, +BS  Skin/Integumen: warm/dry  Other:      Medications   Current Facility-Administered Medications   Medication Dose Route Frequency Provider Last Rate Last Admin    dextrose 5% infusion   Intravenous Continuous Monse Lawson  mL/hr at 05/05/25 0049 New Bag at 05/05/25 0049     Current Facility-Administered Medications   Medication Dose Route Frequency Provider Last Rate Last Admin    amLODIPine (NORVASC) tablet 10 mg  10 mg Oral Daily Troy Farley MD   10 mg at 05/04/25 0858    cinacalcet (SENSIPAR) tablet 90 mg  90 mg Oral Daily NIKOLAY Gambino MD   90 mg at 05/05/25 0754    cloNIDine (CATAPRES) tablet 0.1 mg  0.1 mg Oral BID Armando Kern MD   0.1 mg at 05/05/25 0755    [Held by provider] divalproex sodium extended-release (DEPAKOTE ER) 24 hr tablet 500 mg  500 mg Oral At Bedtime Eduardo Reza PA-C   500 mg at 05/02/25 2108    [Held by provider] enoxaparin ANTICOAGULANT (LOVENOX) injection 40 mg  40 mg Subcutaneous Q12H Troy Farley MD   40 mg at 04/27/25 2040    FLUoxetine (PROzac) capsule 20 mg  20 mg Oral Daily Eduardo Reza PA-C   20 mg at 05/05/25 0754    lisinopril (ZESTRIL) tablet 40 mg  40 mg Oral Daily Troy Farley MD   40 mg at 05/04/25 0858    melatonin tablet 3 mg  3 mg Oral At Bedtime Oleksandr Kong MD   3 mg at 05/04/25 2109    metoprolol succinate ER (TOPROL XL) 24 hr tablet 50 mg  50 mg Oral Daily Armando Kern MD   50 mg at 05/05/25 0754    sennosides (SENOKOT) tablet 1 tablet  1 tablet Oral Daily Armando Kern MD   1 tablet at 05/05/25 0754     sodium chloride (PF) 0.9% PF flush 3 mL  3 mL Intracatheter Q8H Jonatan Simental MD   3 mL at 05/04/25 0900       Data   Recent Labs   Lab 05/05/25  0842 05/04/25  1616 05/04/25  0721 05/04/25  0616 05/03/25  1147 05/02/25  0841   WBC 13.1*  --  12.5*  --   --  14.9*   HGB 12.0*  --  12.6*  --   --  13.4   MCV 63*  --  62*  --   --  61*   *  --  523*  --   --  603*     --   --  149* 150* 149*   POTASSIUM 3.7  --   --  3.9 4.3 4.3   CHLORIDE 107  --   --  111* 115* 112*   CO2 22  --   --  25 25 24   BUN 39.1*  --   --  34.3* 30.2* 37.5*   CR 1.03  --   --  0.90 0.81 0.94   ANIONGAP 13  --   --  13 10 13   UDAY 10.2  --   --  11.0* 11.6* 12.4*   * 122*  --  111* 98 103*   ALBUMIN 3.8  --   --   --   --   --    PROTTOTAL 7.1  --   --   --   --   --    BILITOTAL 0.9  --   --   --   --   --    ALKPHOS 118  --   --   --   --   --    ALT 66  --   --   --   --   --    AST 53*  --   --   --   --   --        No results found for this or any previous visit (from the past 24 hours).

## 2025-05-05 NOTE — PROGRESS NOTES
Care Management Follow Up    Length of Stay (days): 28    Expected Discharge Date: 05/06/2025     Concerns to be Addressed: discharge planning     Patient plan of care discussed at interdisciplinary rounds: Yes    Anticipated Discharge Disposition:        Anticipated Discharge Services:    Anticipated Discharge DME:      Patient/family educated on Medicare website which has current facility and service quality ratings:    Education Provided on the Discharge Plan:    Patient/Family in Agreement with the Plan: yes    Referrals Placed by CM/SW:    Private pay costs discussed: Not applicable    Discussed  Partnership in Safe Discharge Planning  document with patient/family: No     Handoff Completed: No, handoff not indicated or clinically appropriate    Additional Information:  Email received from Shelby with [facility] asking for an update with Vera on email as well. Writer responded and Cc'd RN Caro KIRKLAND for awareness:    I am CC'ing the care coordinator Caro to this email. From what I understand a hospital bed, Winnie lift and Kay chair need to be ordered for him to return to [place]. The requests for equipment have been faxed and will be followed up with tomorrow.  We will know more then.    Next Steps: follow up on equipment and update facility    ZOFIA Myers

## 2025-05-06 LAB
ANION GAP SERPL CALCULATED.3IONS-SCNC: 14 MMOL/L (ref 7–15)
BUN SERPL-MCNC: 38.2 MG/DL (ref 8–23)
CALCIUM SERPL-MCNC: 9.4 MG/DL (ref 8.8–10.4)
CHLORIDE SERPL-SCNC: 105 MMOL/L (ref 98–107)
CREAT SERPL-MCNC: 0.99 MG/DL (ref 0.67–1.17)
EGFRCR SERPLBLD CKD-EPI 2021: 84 ML/MIN/1.73M2
ERYTHROCYTE [DISTWIDTH] IN BLOOD BY AUTOMATED COUNT: 17.8 % (ref 10–15)
GLUCOSE SERPL-MCNC: 122 MG/DL (ref 70–99)
HCO3 SERPL-SCNC: 22 MMOL/L (ref 22–29)
HCT VFR BLD AUTO: 37.4 % (ref 40–53)
HGB BLD-MCNC: 11.5 G/DL (ref 13.3–17.7)
MCH RBC QN AUTO: 18.9 PG (ref 26.5–33)
MCHC RBC AUTO-ENTMCNC: 30.7 G/DL (ref 31.5–36.5)
MCV RBC AUTO: 62 FL (ref 78–100)
PLATELET # BLD AUTO: 421 10E3/UL (ref 150–450)
POTASSIUM SERPL-SCNC: 3.6 MMOL/L (ref 3.4–5.3)
RBC # BLD AUTO: 6.07 10E6/UL (ref 4.4–5.9)
SODIUM SERPL-SCNC: 141 MMOL/L (ref 135–145)
WBC # BLD AUTO: 10.7 10E3/UL (ref 4–11)

## 2025-05-06 PROCEDURE — 250N000013 HC RX MED GY IP 250 OP 250 PS 637: Performed by: HOSPITALIST

## 2025-05-06 PROCEDURE — 36415 COLL VENOUS BLD VENIPUNCTURE: CPT | Performed by: INTERNAL MEDICINE

## 2025-05-06 PROCEDURE — 250N000013 HC RX MED GY IP 250 OP 250 PS 637: Performed by: PHYSICIAN ASSISTANT

## 2025-05-06 PROCEDURE — 250N000013 HC RX MED GY IP 250 OP 250 PS 637: Performed by: INTERNAL MEDICINE

## 2025-05-06 PROCEDURE — 99232 SBSQ HOSP IP/OBS MODERATE 35: CPT | Performed by: INTERNAL MEDICINE

## 2025-05-06 PROCEDURE — 85014 HEMATOCRIT: CPT | Performed by: INTERNAL MEDICINE

## 2025-05-06 PROCEDURE — 120N000001 HC R&B MED SURG/OB

## 2025-05-06 PROCEDURE — 80048 BASIC METABOLIC PNL TOTAL CA: CPT | Performed by: INTERNAL MEDICINE

## 2025-05-06 RX ADMIN — FLUOXETINE HYDROCHLORIDE 20 MG: 20 CAPSULE ORAL at 09:19

## 2025-05-06 RX ADMIN — CINACALCET 90 MG: 30 TABLET ORAL at 09:19

## 2025-05-06 RX ADMIN — SENNOSIDES 1 TABLET: 8.6 TABLET ORAL at 09:20

## 2025-05-06 RX ADMIN — CLONIDINE HYDROCHLORIDE 0.1 MG: 0.1 TABLET ORAL at 09:20

## 2025-05-06 RX ADMIN — MELATONIN TAB 3 MG 3 MG: 3 TAB at 21:41

## 2025-05-06 RX ADMIN — Medication 1 TABLET: at 09:20

## 2025-05-06 RX ADMIN — METOPROLOL SUCCINATE 50 MG: 50 TABLET, EXTENDED RELEASE ORAL at 09:20

## 2025-05-06 ASSESSMENT — ACTIVITIES OF DAILY LIVING (ADL)
ADLS_ACUITY_SCORE: 74
ADLS_ACUITY_SCORE: 78
ADLS_ACUITY_SCORE: 74
ADLS_ACUITY_SCORE: 78
ADLS_ACUITY_SCORE: 72
ADLS_ACUITY_SCORE: 78
ADLS_ACUITY_SCORE: 74
ADLS_ACUITY_SCORE: 72
ADLS_ACUITY_SCORE: 74
ADLS_ACUITY_SCORE: 72
ADLS_ACUITY_SCORE: 74
ADLS_ACUITY_SCORE: 78
ADLS_ACUITY_SCORE: 74
ADLS_ACUITY_SCORE: 74
ADLS_ACUITY_SCORE: 78
ADLS_ACUITY_SCORE: 72
ADLS_ACUITY_SCORE: 74
ADLS_ACUITY_SCORE: 72
ADLS_ACUITY_SCORE: 74

## 2025-05-06 NOTE — PROGRESS NOTES
Neurology follow-up: 05/06/25    Patient admitted with acute worsening of mental status on chronic cognitive decline.   He is 65 years old with possible Alzheimer's dementia. Neurology consulted for the acute worsening.    Interval history and investigations   On 5/4, discussed management with primary service. Depakote level was low, but since he started to experience less interaction from baseline after this in the setting of brain atrophy and impairment at baseline, recommended holding Depakote after discussing with psychiatry. Thought was perhaps whatever systemic problem that induced agitation prior has cleared and the medication is causing his decreased participation and reactivity.  5/5: Patient was seen and he was far more interactive off Depakote, speaking in broken sentences, still not consistently participatory but more engaged with the examiner in interactions. I was also informed that he was participating in PT, walking.    Examination   Vitals: /59 (BP Location: Right arm)   Pulse 65   Temp 97.6  F (36.4  C) (Axillary)   Resp 18   SpO2 93%   General: Adult  patient, lying in bed, NAD  Neuro:  Mental status: Awake, alert, attentive, focuses and tracks examiner, will speak in up to about 5 words partially conveying a thought and responding to questions with yes or no, did smile twice during interaction and conversation the examiner had with him involving praising his efforts during PT. Follows commands inconsistently  Cranial nerves: blinks to threat in all 4 quadrants, PERRL, conjugate gaze, EOMI, face asymmetry could not be appreciate, no abnormal facial movements, tongue protrudes midline  Motor: Wiggles toes, moves upper extremities symmetrically, significant paratonia on exam of all extremities    ASSESSMENT     Encephalopathy, superimposed on probable Alzheimer's disease  Depakote started in 4/7/2025, started by psychiatry to help with increased agitation, too sedating after this initial  agitation cleared  Elevated CRP/ESR from 4/27/2025  Elevated transaminases, AST from 4/20/2025 and ALT from 4/26/2025  Hypercalcemia  Downtrending leukocytosis     Stopping Depakote has helped to improve his mentation from how he was seen on 5/3/2024 by this examiner. Continue to hold this medication at this time and note that perhaps it was too sedating.    RECOMMENDATIONS   Recommend that family or loved ones see him and provide feedback on progress from his baseline (compared to how he was at the living facility prior to admission if possible  Continue general evaluations for problems above as these can contribute to potential future worsening of mental status    No further follow up or recommendations at this time, but if his mentation changes, please place a new consult. Neurology will sign off at this time.    Thank you for allowing me to participate in Mr. Aaron's care.       Zulma Guzmán MD   Neurologist, [facility]  May 6, 2025 8:46 AM      A total time of 30 minutes was spent on the care of this patient on the date of the visit, outside of time spent performing any procedures. Please excuse any typographical errors, as this note was generated using a Voice Recognition Software.

## 2025-05-06 NOTE — PLAN OF CARE
Date/Time: 5/5/25 0700 - 1900  Mental Status: Alert to self  Activity/dangle: Ast of 2 GB/W  Diet: Regular diet  Pain: Denies pain / Pt comfortable w/ no signs of mourning or crying  Alberto/Voiding: Incontinent  Tele/Restraints/Iso: N/A  02/LDA: Room air. No IV access, pt removed  D/C Date: TBD  Other Info: Pt more alert today, talking clear sentences and ambulating. Pt sets bed alarm off when pt has to void.

## 2025-05-06 NOTE — PROVIDER NOTIFICATION
MD Notification    Notified Person: MD    Notified Person Name: Osbaldo Lazaro    Notification Date/Time:8:20 PM     Notification Interaction: ANCOM    Purpose of Notification: Pt is very restless and impulsive. Can we have some PRN meds to calm him down pls.       Orders Received:    Comments:  Instructed to refer to Neurologist

## 2025-05-06 NOTE — PROGRESS NOTES
"   05/05/25 1600   Appointment Info   Signing Clinician's Name / Credentials (PT) Florencio Ramon DPT   Living Environment   Current Living Arrangements assisted living   Home Accessibility no concerns   Self-Care   Activity/Exercise/Self-Care Comment per chart review:  Santos is independent in ambulation without device; SBA with ADL's.  Receives medication management and meals.  He has been refusing showers.   General Information   Onset of Illness/Injury or Date of Surgery 04/04/25   Referring Physician Monse Lawson,    Patient/Family Therapy Goals Statement (PT) unable to state   Pertinent History of Current Problem (include personal factors and/or comorbidities that impact the POC) Per chart: Estevan Aaron is a 65 year old male with PMHx of hypertension, hyperlipidemia, hyperparathyroidism and dementia who was admitted on 4/4/2025 with aggressive behaviors at his care facility.   Existing Precautions/Restrictions fall   Weight-Bearing Status - LLE full weight-bearing   Weight-Bearing Status - RLE full weight-bearing   Cognition   Affect/Mental Status (Cognition) unable/difficult to assess   Orientation Status (Cognition) unable/difficult to assess   Follows Commands (Cognition) follows one-step commands;25-49% accuracy;delayed response/completion;increased processing time needed;initiation impaired;physical/tactile prompts required;repetition of directions required;verbal cues/prompting required   Behavioral Issues withdrawn   Cognitive Status Comments baseline dementia, pt only responding with \"yeah\" to most all questions, spoke a few other words at times but not related to anything happening in therapy session   Pain Assessment   Patient Currently in Pain   (difficult to assess pt only answering questions with \"yeah\")   Integumentary/Edema   Integumentary/Edema no deficits were identifed   Posture    Posture Forward head position   Range of Motion (ROM)   Range of Motion ROM is WFL   Strength (Manual " Muscle Testing)   Strength (Manual Muscle Testing) Deficits observed during functional mobility   Strength Comments unable to perform MMT due to cognition, appeared weak at times during mobility   Bed Mobility   Comment, (Bed Mobility) MaxA x 2   Transfers   Comment, (Transfers) Mark x 2   Gait/Stairs (Locomotion)   Yellow Medicine Level (Gait) minimum assist (75% patient effort)   Assistive Device (Gait) walker, front-wheeled   Distance in Feet (Gait) 3'   Pattern (Gait) step-through   Deviations/Abnormal Patterns (Gait) festinating/shuffling   Balance   Balance Comments impaired dynamic balance, sitting blaance adequate   Sensory Examination   Sensory Perception Comments pt unable to state if having deficits   Clinical Impression   Criteria for Skilled Therapeutic Intervention Yes, treatment indicated   PT Diagnosis (PT) impaired gait   Influenced by the following impairments deficits with functional strength, balance, cognition   Functional limitations due to impairments decreased ind with all functional mobility   Clinical Presentation (PT Evaluation Complexity) evolving   Clinical Presentation Rationale clinical judgement   Clinical Decision Making (Complexity) low complexity   Planned Therapy Interventions (PT) balance training;bed mobility training;gait training;strengthening;stretching;transfer training;progressive activity/exercise   Risk & Benefits of therapy have been explained evaluation/treatment results reviewed;care plan/treatment goals reviewed;risks/benefits reviewed;current/potential barriers reviewed;participants voiced agreement with care plan;participants included;patient   PT Total Evaluation Time   PT Eval, Low Complexity Minutes (52895) 8   Physical Therapy Goals   PT Frequency 5x/week   PT Predicted Duration/Target Date for Goal Attainment 05/12/25   PT Goals Bed Mobility;Transfers;Gait   PT: Bed Mobility Minimal assist;Supine to/from sit   PT: Transfers Supervision/stand-by assist;Sit to/from  "stand;Assistive device   PT: Gait Minimal assist;Rolling walker;25 feet   Interventions   Interventions Quick Adds Gait Training;Therapeutic Activity;Therapeutic Procedure   Therapeutic Activity   Therapeutic Activities: dynamic activities to improve functional performance Minutes (13406) 23   Symptoms Noted During/After Treatment Fatigue   Treatment Detail/Skilled Intervention Pt greeted supine in bed. Rehab aide Kwabena called for assist. Pt was only responding to most all questions with \"yeah\" mostly non-verbal, would ask pt to do something or give a cue for pt to perform some movement and pt would say \"yeah\" but not move at all, needing repeated instructions with heavy tactlie cueing throughout. MaxA x 2 for supine>sit after trying to cue pt for several min to move legs/arms without much help. Good sitting balance at EOB. Performed STS with FWW x 3 using Mark 1-2 for safety, pt needing to rock forward several times and go on \"1,2..3\". Upon standing adequate balance with CGA/Mark. Pt amb ~3' to recliner next to bed, pt needing heavy cueing to step each foot, pt would move a foot and then stop and just stare, taking ~5 min to walk the 3' and position self to sit in recliner. Pt then ambulated another 7' with FWW and Mark with chair follow, again heavy cueing to keep walking, gait slow and shuffled, mild unsteadiness at times but no LOB. Pt left sitting in recliner with feet elevated, call light in reach, chair alarm on.   Gait Training   Treatment Detail/Skilled Intervention gait initiated see TA   Distance in Feet 11'   PT Discharge Planning   PT Plan bed mobility, repeat STS, progress gait with chair follow   PT Discharge Recommendation (DC Rec) home with assist;home with home care physical therapy;other (see comments)  (home to [facility])   PT Rationale for DC Rec Pt well below baseline of IND per chart review. Pt appears mostly limited by cognitive deficits and dementia, nearly non-verbal during PT session, heavy " "encouragement/cueing needed to move and then needing asst x 2 for bed mobility and asst x2 for safey for transfers and walking short distances in room with a FWW. Per [facility] review care coordinator indicating pt can be accepted back to facility with \"hospital bed, Winnie lift and Kay chair need to be ordered for him to return to [place].\"  Pt will likely need a FWW as well to mobilize and possibly a wheelchair. Depending on pt's cognition if able to improve could still have rehab potential to progress mobility once home with HHPT if pt able to meaningfully participate   PT Brief overview of current status Asst x 2 with FWW - Goals of therapy will be to address safe mobility and make recs for d/c to next level of care. Pt and RN will continue to follow all falls risk precautions as documented by RN staff while hospitalized   PT Total Distance Amb During Session (feet) 11   PT Equipment Needed at Discharge   (hospital bed, Winnie lift and Kay chair needed per [facility] to take pt back, walker for short distance, wheelchair)   Physical Therapy Time and Intention   Timed Code Treatment Minutes 23   Total Session Time (sum of timed and untimed services) 31     "

## 2025-05-06 NOTE — PROGRESS NOTES
Date/Time 5/6/25 [id number]  Diagnosis:Aggressive behavior   POD#:NA  Mental Status:Alert to self  Activity/dangle: A2& walker   Diet:Reg   Pain:denies  Alberto/Voiding:Urinal, external cath  Tele/Restraints/Iso:Sitter by bedside  02/LDA:RA, No IV access   D/C Date:pending placement   Other Info:VSS on RA, Had BMx2. More alert & talking clear sentence. Had sitter by bedside.

## 2025-05-06 NOTE — PROGRESS NOTES
Care Management Follow Up    Length of Stay (days): 29    Expected Discharge Date: 05/06/2025     Concerns to be Addressed: discharge planning     Patient plan of care discussed at interdisciplinary rounds: Yes    Anticipated Discharge Disposition:                Anticipated Discharge Services:    Anticipated Discharge DME:      Patient/family educated on Medicare website which has current facility and service quality ratings:    Education Provided on the Discharge Plan:    Patient/Family in Agreement with the Plan: yes    Referrals Placed by CM/SW:    Private pay costs discussed: Not applicable    Discussed  Partnership in Safe Discharge Planning  document with patient/family: No     Handoff Completed: No, handoff not indicated or clinically appropriate    Additional Information:  Called [facility] home equipment [phone] to follow up about ricardo and bed orders that were sent on 5/1/25-  [facility] said they are working on ricardo lift and that medicare would not pay for a bed.  They stated they called and informed his daughter.  Call placed to his daughter and left message to call back to get update about bed for patient.    Addendum @ [id number] Called  daughter again and spoke with her that [facility] will not be able to provide a hospital bed due to medicare not covering.  Gerichair will also need to be provided per previous note from care coordinator.  She stated she was going to call her father's memory care to see if they will accept him back with the current bed and chair that are there.  Writer asked daughter to call back to update.    Next Steps: Await call back from daughter to follow up with gerichair and bed.  Call  to check if daughter doesn't call back by morning of 5/7/25.        Lily Newman, Care Management RN, [facility]   Contact: via Afsaneh

## 2025-05-06 NOTE — PROGRESS NOTES
[facility]    Hospitalist Progress Note    Date of Admission: 4/4/2025    Assessment & Plan   Estevan Aaron is a 65 year old male with PMHx of hypertension, hyperlipidemia, hyperparathyroidism and dementia who was admitted on 4/4/2025 with aggressive behaviors at his care facility.     Acute toxic metabolic encephalopathy, multifactorial  Hospital-acquired delirium  Chronic neuropsychiatric disorder with behavioral disturbance, unclear etiology  Alzheimer's dementia  *Patient carries diagnosis of Alzheimer's dementia, although has significant history of underlying psychiatric disease and concern for paranoid personality disorder. Patient's ex-wife/guardian describes a rather accelerated mental status decline beginning in late 2024. Had a prolonged inpatient psychiatric hospitalization 10/2024-2/2025.  At that time, suspected multifactorial encephalopathy dt delirium, psychoactive medication effects, and undifferentiated neuropsychiatric disorder.  *Prior to admission, patient was ambulating independently at [facility].  *Reportedly exhibited aggressive behaviors at [facility], prompting referral to ED for evaluation  *In the ED, patient was calm and cooperative with staff, although has since had significant behavioral disturbances after admission.   *Has been seen by psych this stay, assisted with medication titration, however, patient developed severe encephalopathy with persistence somnolence.  *Had a period of transient improvement in his mentation which was followed by subsequent decline with worsening somnolence noted on 4/24.  *Etiology of persistent encephalopathy and worsening functional status unclear (began requiring Winnie lift, assist of 2; not following commands; had nonsensical speech). Considered occult infection (see fever below), consider rare neurological disorder (LP done 4/29), consider prolonged delirium, and consider progressive dementia.  *Additional  neurologic workup pursued.  MRI of brain this day was negative for acute changes. Mildly elevated ESR/CRP this stay. Thyroid studies generally unremarkable. EEG obtained 4/26 was consistent with encephalopathy, no seizures. LP done 4/29, was negative for CNS infection. MRI cervical spine negative on 4/29.   *Alzheimer's evaluation ([id number]) done on CSF and was elevated -- consistent with Alzheimer's.  *Psych attempted an Ativan challenge for treatment of possible catatonia, this resulted in increased somnolence making catatonia less likely culprit.  *Paraneoplastic panel pending but findings now seem suggestive of underlying Alzheimer's dementia and superimposed delirium?; unsure how much medication effect and electrolyte disturbances may also be contributing  *Discussed with neuro on 5/4 -- will hold Depakote beginning 5/4 and monitor response in coming days  -- mentation/overall condition continues to improve off Depakote -- cont holding  -- lytes/CBC have now normalized  -- cont holding Seroquel  -- cont delirium precautions  -- anticipate discharge back to memory care facility in the coming days    Concern for aspiration PNA vs pneumonitis: Treated  Intermittent fever w/ leukocytosis, unclear etiology: Improved  Acute hypoxic hypercarbic respiratory failure: Resolved  Sepsis w/ elevated lactate: Resolved  Respiratory acidosis w/ metabolic compensation: Resolved  *RRT called 4/20 AM for somnolence, congested cough, hypoxia to 84%, and borderline fever. Briefly required BiPAP and quickly weaned to O2 NC. CXR without opacity, although some concern for acute aspiration event in setting of suspected medication-mediated somnolence as above.  Was able to quickly wean off O2. Intermittent fevers and leukocytosis of unclear etiology.  UA noninfectious, no ongoing respiratory symptoms, COVID/flu negative, CXR stable, pressure injury noted without concern for infection, no other skin changes, and MRI without acute  change. LFTs mildly elevated but no apparent pain with deep abdominal palpation. No new medications. Overall unclear exact etiology of fevers and challenging assessment given underlying dementia. Completed 10 day course of empiric Zosyn 4/29 with resolution of fevers, persistent leukocytosis, no further treatment recommended by ID.  -- WBC normalized, monitor periodically    Abnormal transaminases: Stable  *AST/ALT intermittently elevated this stay. Total bili, alk phos nl.   *Holding statin. Monitor labs periodically.    Hyperparathyroidism with hypercalcemia  Hypernatremia: Improved  *Ongoing hypercalcemia noted this stay despite cinacalcet administration (held 4/23-4/26). Low suspicion that mild hypercalcemia alone is driving mental status changes. Repeat PTH slightly elevated to 73 this admission. Ionized calcium is elevated.  *Nephrology consulted 4/30 for high calcium despite cinacalcet. Cinacalcet increased, given Zometa x1 on 5/1.   *Given IVF bolus on 5/1-5/2 dt poor po intake. Lasix held. Given 500ml D5W on 5/3.   *Na and Ca slowly improved, unclear how much to correlate with slight improvement in mentation  -- cont cinacalcet 90mg daily  -- monitor BMP periodically    Hypertension  Hyperlipidemia  *PTA meds: amlodipine, clonidine, lisinopril, metoprolol, statin.   -- BPs soft on 5/6 -- will hold amlodipine, other meds continued with hold parameters  -- statin on hold dt abnl LFTs    Suspected ADAM   *No formal sleep study, although high suspicion. Outpatient sleep center referral on discharge    Deep tissue pressure injury, sacrococcygeal area, hospital-acquired   *No signs of infection. WOC RN following    FEN: IVFs dc'd 5/6, lytes otherwise stable, bite sized diet (level 6) with thin liquids (level 0)  DVT Prophylaxis: PCDs  Code Status: No CPR- Do NOT Intubate    Disposition: Condition slowly improving off Depakote. Labs improved. More engaged, now able to feed himself as of 5/6. Discussed with  patient's daughter Maria C this AM (family not visiting in recent days dt URI), conversation surrounding hospice seems less urgent now given his improvement. Anticipate discharge back to memory care/LTC in the coming days without hospice enrollment (can continue to have hospice discussions with primary provider pending overall clinical condition in the coming weeks).     Maria C updated on 5/6 over the phone as above.     Medically Ready for Discharge: Anticipated in 2-4 Days      Monse Lawson, DO    Clinically Significant Risk Factors               # Hypoalbuminemia: Lowest albumin = 2.9 g/dL at 4/28/2025  4:51 AM, will monitor as appropriate     # Hypertension: Noted on problem list     # Acute Hypercapnic Respiratory Failure: based on venous blood gas results.  Continue supplemental oxygen and ventilatory support as indicated.    # Dementia: noted on problem list            # Financial/Environmental Concerns: none           Medical Decision Making       Please see A&P for additional details of medical decision making.         Interval History   Chart reviewed.  Seen this morning. Mentation much improved! Alert and answering some basic questions. More engaged with me today than on prior days. Oriented to self and year (with some prompting), think's he's on a boat. Able to follow commands in a more timely manner than previous days. No specific complaints of pain, dyspnea. Taking po and per RN was able to feed himself today. Was able to give me a fist bump at the end of our visit.  Per RN, few BP meds held this AM for soft BPs.     -Data reviewed today: I reviewed all new labs and imaging results over the last 24 hours. I personally reviewed no images or EKG's today.    Physical Exam   Temp: 98.4  F (36.9  C) Temp src: Axillary BP: 104/66 Pulse: 64   Resp: 18 SpO2: 95 % O2 Device: None (Room air)    There were no vitals filed for this visit.  Vital Signs with Ranges  Temp:  [97  F (36.1  C)-98.4  F (36.9  C)]  98.4  F (36.9  C)  Pulse:  [60-68] 64  Resp:  [16-18] 18  BP: ()/(58-66) 104/66  SpO2:  [93 %-95 %] 95 %  No intake/output data recorded.    Constitutional: Resting comfortably, more interactive today -- alert, oriented to self/year (with some prompting), able to follow commands in a more timely fashion today, NAD  Respiratory: CTAB, no wheeze, no increased work of breathing  Cardiovascular: HRRR, no MGR, no LE edema  GI: S, NT, ND, +BS  Skin/Integumen: warm/dry  Other:      Medications   Current Facility-Administered Medications   Medication Dose Route Frequency Provider Last Rate Last Admin    dextrose 5% infusion   Intravenous Continuous Monse Lawson DO   Stopped at 05/05/25 1815     Current Facility-Administered Medications   Medication Dose Route Frequency Provider Last Rate Last Admin    amLODIPine (NORVASC) tablet 10 mg  10 mg Oral Daily Troy Farley MD   10 mg at 05/04/25 0858    cinacalcet (SENSIPAR) tablet 90 mg  90 mg Oral Daily NIKOLAY Gambino MD   90 mg at 05/06/25 0919    cloNIDine (CATAPRES) tablet 0.1 mg  0.1 mg Oral BID Armando Kern MD   0.1 mg at 05/06/25 0920    [Held by provider] divalproex sodium extended-release (DEPAKOTE ER) 24 hr tablet 500 mg  500 mg Oral At Bedtime Eduardo Reza PA-C   500 mg at 05/02/25 2108    [Held by provider] enoxaparin ANTICOAGULANT (LOVENOX) injection 40 mg  40 mg Subcutaneous Q12H Troy Farley MD   40 mg at 04/27/25 2040    FLUoxetine (PROzac) capsule 20 mg  20 mg Oral Daily Eduardo Reza PA-C   20 mg at 05/06/25 0919    lisinopril (ZESTRIL) tablet 40 mg  40 mg Oral Daily Troy Farley MD   40 mg at 05/04/25 0858    melatonin tablet 3 mg  3 mg Oral At Bedtime Oleksandr Kong MD   3 mg at 05/05/25 2140    metoprolol succinate ER (TOPROL XL) 24 hr tablet 50 mg  50 mg Oral Daily Armando Kern MD   50 mg at 05/06/25 0920    multivitamin w/minerals (THERA-VIT-M) tablet 1 tablet  1 tablet Oral Daily Monse Lawson,  DO   1 tablet at 05/06/25 0920    sennosides (SENOKOT) tablet 1 tablet  1 tablet Oral Daily Armando Kern MD   1 tablet at 05/06/25 0920    sodium chloride (PF) 0.9% PF flush 3 mL  3 mL Intracatheter Q8H Jonatan Simental MD   3 mL at 05/04/25 0900       Data   Recent Labs   Lab 05/06/25  0924 05/05/25  0842 05/04/25  1616 05/04/25  0721 05/04/25  0616 05/03/25  1147   WBC 10.7 13.1*  --  12.5*  --   --    HGB 11.5* 12.0*  --  12.6*  --   --    MCV 62* 63*  --  62*  --   --     454*  --  523*  --   --    NA  --  142  --   --  149* 150*   POTASSIUM  --  3.7  --   --  3.9 4.3   CHLORIDE  --  107  --   --  111* 115*   CO2  --  22  --   --  25 25   BUN  --  39.1*  --   --  34.3* 30.2*   CR  --  1.03  --   --  0.90 0.81   ANIONGAP  --  13  --   --  13 10   UDAY  --  10.2  --   --  11.0* 11.6*   GLC  --  136* 122*  --  111* 98   ALBUMIN  --  3.8  --   --   --   --    PROTTOTAL  --  7.1  --   --   --   --    BILITOTAL  --  0.9  --   --   --   --    ALKPHOS  --  118  --   --   --   --    ALT  --  66  --   --   --   --    AST  --  53*  --   --   --   --        No results found for this or any previous visit (from the past 24 hours).

## 2025-05-06 NOTE — PLAN OF CARE
Goal Outcome Evaluation:    Mental Status: Alert to self  Activity/dangle: Assist of 2 Gait belt and walker  Diet: Regular diet  Pain: Denies pain / Pt comfortable  Alberto/Voiding: Incontinent  Tele/Restraints/Iso: N/A  02/LDA: Room air. No IV access,  MD aware per previous nurse  D/C Date: TBD  Other Info: Has been impulsive and not cooperative. On 1:1 to prevent fall. Takes pills one at a time with apple sauce. Will continue to monitor.

## 2025-05-07 LAB
AMPAR2 IGG CSF QL CBA IFA: NEGATIVE
AMPHIPHYSIN AB CSF QL IF: NEGATIVE
ANNOTATION COMMENT IMP: NORMAL
CASPR2 IGG CSF QL CBA IFA: NEGATIVE
CV2 AB CSF QL IF: NEGATIVE
DPPX IGG CSF QL CBA IFA: NEGATIVE
GABABR IGG CSF QL CBA IFA: NEGATIVE
GAD65 IGG+IGM CSF IA-SCNC: 0 NMOL/L
GFAP ALPHA IGG CSF QL IF: NEGATIVE
GLIAL NUC TYPE 1 AB CSF QL IF: NEGATIVE
HU1 AB CSF QL IF: NEGATIVE
HU2 AB CSF QL IF: NEGATIVE
HU3 AB CSF QL IF: NEGATIVE
IGLON5 IGG CSF QL CBA IFA: NEGATIVE
IMMUNOLOGIST REVIEW: NORMAL
LGI1 IGG CSF QL CBA IFA: NEGATIVE
MGLUR1 IGG CSF QL IF: NEGATIVE
NEUROCHONDRIN AB CSF QL IF: NEGATIVE
NIF IGG CSF QL IF: NEGATIVE
NMDAR1 IGG CSF QL CBA IFA: NEGATIVE
PCA-1 AB CSF QL IF: NEGATIVE
PCA-2 AB CSF QL IF: NEGATIVE
PCA-TR AB CSF QL IF: NEGATIVE
PDE10A AB SPEC QL IF: NEGATIVE
POTASSIUM SERPL-SCNC: 3.5 MMOL/L (ref 3.4–5.3)
SEPTIN-7 IGG CSF QL IF: NEGATIVE
TRIM46 SPEC QL IF: NEGATIVE

## 2025-05-07 PROCEDURE — 250N000013 HC RX MED GY IP 250 OP 250 PS 637: Performed by: INTERNAL MEDICINE

## 2025-05-07 PROCEDURE — 36415 COLL VENOUS BLD VENIPUNCTURE: CPT | Performed by: HOSPITALIST

## 2025-05-07 PROCEDURE — 250N000013 HC RX MED GY IP 250 OP 250 PS 637: Performed by: PHYSICIAN ASSISTANT

## 2025-05-07 PROCEDURE — 99207 PR APP CREDIT; MD BILLING SHARED VISIT: CPT | Performed by: NURSE PRACTITIONER

## 2025-05-07 PROCEDURE — 120N000001 HC R&B MED SURG/OB

## 2025-05-07 PROCEDURE — 84132 ASSAY OF SERUM POTASSIUM: CPT | Performed by: HOSPITALIST

## 2025-05-07 PROCEDURE — 99233 SBSQ HOSP IP/OBS HIGH 50: CPT | Performed by: INTERNAL MEDICINE

## 2025-05-07 PROCEDURE — 250N000013 HC RX MED GY IP 250 OP 250 PS 637: Performed by: STUDENT IN AN ORGANIZED HEALTH CARE EDUCATION/TRAINING PROGRAM

## 2025-05-07 PROCEDURE — 250N000013 HC RX MED GY IP 250 OP 250 PS 637: Performed by: HOSPITALIST

## 2025-05-07 RX ADMIN — LISINOPRIL 40 MG: 40 TABLET ORAL at 09:31

## 2025-05-07 RX ADMIN — FLUOXETINE HYDROCHLORIDE 20 MG: 20 CAPSULE ORAL at 09:31

## 2025-05-07 RX ADMIN — CLONIDINE HYDROCHLORIDE 0.1 MG: 0.1 TABLET ORAL at 19:49

## 2025-05-07 RX ADMIN — CINACALCET 90 MG: 30 TABLET ORAL at 09:30

## 2025-05-07 RX ADMIN — METOPROLOL SUCCINATE 50 MG: 50 TABLET, EXTENDED RELEASE ORAL at 09:35

## 2025-05-07 RX ADMIN — Medication 1 TABLET: at 09:30

## 2025-05-07 RX ADMIN — CLONIDINE HYDROCHLORIDE 0.1 MG: 0.1 TABLET ORAL at 09:30

## 2025-05-07 RX ADMIN — MELATONIN TAB 3 MG 3 MG: 3 TAB at 21:11

## 2025-05-07 ASSESSMENT — ACTIVITIES OF DAILY LIVING (ADL)
ADLS_ACUITY_SCORE: 74
ADLS_ACUITY_SCORE: 74
ADLS_ACUITY_SCORE: 72
ADLS_ACUITY_SCORE: 74
ADLS_ACUITY_SCORE: 74
ADLS_ACUITY_SCORE: 73
ADLS_ACUITY_SCORE: 72
ADLS_ACUITY_SCORE: 73
ADLS_ACUITY_SCORE: 74
ADLS_ACUITY_SCORE: 72
ADLS_ACUITY_SCORE: 74
ADLS_ACUITY_SCORE: 72
ADLS_ACUITY_SCORE: 72
ADLS_ACUITY_SCORE: 74
ADLS_ACUITY_SCORE: 72
ADLS_ACUITY_SCORE: 72
ADLS_ACUITY_SCORE: 74

## 2025-05-07 NOTE — PROGRESS NOTES
Summary:  Originally presented with aggressive behavior from his [facility],Hx of dementia w/ aggression.  Now pt somnolent w/fever and concern of aspiration pneumonia.      Orientation: A/Ox4 self    Vitals/Tele: VSS on RA    IV Access/drains: No piv access    Diet: Regular    Mobility: A x2 GBW    GI/: Incontinent of B/B    Wound/Skin: Dry flaky, BLE scabs, abrasion to back  R shin mepi in place.Scattered bruising,     Consults:     Discharge Plan: Pending    Pt got aggressive, violent and difficult to reorient, called code 21 and soft restraint was order. Pt now calm and asleep, restraint discontinued at this time.     See Flow sheets for assessment

## 2025-05-07 NOTE — PROGRESS NOTES
"Care Management Follow Up    Length of Stay (days): 30    Expected Discharge Date: 05/07/2025     Concerns to be Addressed: discharge planning     Patient plan of care discussed at interdisciplinary rounds: Yes    Anticipated Discharge Disposition:                Anticipated Discharge Services:    Anticipated Discharge DME:      Patient/family educated on Medicare website which has current facility and service quality ratings:    Education Provided on the Discharge Plan:    Patient/Family in Agreement with the Plan: yes    Referrals Placed by CM/SW:    Private pay costs discussed: Not applicable    Discussed  Partnership in Safe Discharge Planning  document with patient/family: No     Handoff Completed: No, handoff not indicated or clinically appropriate    Additional Information:  Writer reached out to [facility] home equipment to check on status of Kay chair and hospital bed and spoke with liaison Amirah ([phone]), Amirah states she spoke with daughter/guardian Maria C that patient only has Medicare and hospital bed cannot be provided. Patient will have to private pay for hospital bed. Amirah states the kay chair is currently \"processing\" and will return call back writer as she will investigate status with changes. CM will continue to follow for discharge needs. Continue POC.    Next Steps: secure home equipment     Oleksandr Whitney RN  [facility]  Inpatient Care Management - FLOMAURIZIO BLACK RN Mobile: [phone] daily 7:30-4:00     "

## 2025-05-07 NOTE — PROGRESS NOTES
Diagnosis: Agressive Behavior  POD#: N/A  Mental Status: Alert to self  Activity/dangle: A2 with GBW  Diet: Regular  Pain:   Alberto/Voiding: urinal/eternal cath  Tele/Restraints/Iso:   Skin:   02/LDA:   D/C Date: TBD  Other Info:      Observation Note   Sitter at bedside. No IV access. Denies pain.

## 2025-05-07 NOTE — PROGRESS NOTES
[facility]    Hospitalist Progress Note    Date of Admission: 4/4/2025    Assessment & Plan   Estevan Aaron is a 65 year old male with PMHx of hypertension, hyperlipidemia, hyperparathyroidism and dementia who was admitted on 4/4/2025 with aggressive behaviors at his care facility.     Acute toxic metabolic encephalopathy, multifactorial  Hospital-acquired delirium  Chronic neuropsychiatric disorder with behavioral disturbance, unclear etiology  Alzheimer's dementia  *Patient carries diagnosis of Alzheimer's dementia, although has significant history of underlying psychiatric disease and concern for paranoid personality disorder. Patient's ex-wife/guardian describes a rather accelerated mental status decline beginning in late 2024. Had a prolonged inpatient psychiatric hospitalization 10/2024-2/2025.  At that time, suspected multifactorial encephalopathy dt delirium, psychoactive medication effects, and undifferentiated neuropsychiatric disorder.  *Prior to admission, patient was ambulating independently at [facility].  *Reportedly exhibited aggressive behaviors at [facility], prompting referral to ED for evaluation  *In the ED, patient was calm and cooperative with staff, although has since had significant behavioral disturbances after admission.   *Has been seen by psych this stay, assisted with medication titration, however, patient developed severe encephalopathy with persistence somnolence.  *Had a period of transient improvement in his mentation which was followed by subsequent decline with worsening somnolence noted on 4/24.  *Etiology of persistent encephalopathy and worsening functional status unclear (began requiring Winnie lift, assist of 2; not following commands; had nonsensical speech). Considered occult infection (see fever below), consider rare neurological disorder (LP done 4/29), consider prolonged delirium, and consider progressive dementia.  *Additional  neurologic workup pursued.  MRI of brain this day was negative for acute changes. Mildly elevated ESR/CRP this stay. Thyroid studies generally unremarkable. EEG obtained 4/26 was consistent with encephalopathy, no seizures. LP done 4/29, was negative for CNS infection. MRI cervical spine negative on 4/29. Paraneoplastic panel neg.   *Alzheimer's evaluation ([id number]) done on CSF and was elevated -- consistent with Alzheimer's.  *Psych attempted an Ativan challenge for treatment of possible catatonia, this resulted in increased somnolence making catatonia less likely culprit.  *Clinical picture ultimately appeared suggestive of underlying Alzheimer's dementia and superimposed delirium; unsure how much medication effect of Depakote and electrolyte disturbances may have also be contributing  *Discussed with neuro on 5/4 -- held Depakote beginning 5/4 and monitor response.  -- mentation/overall condition continues to improve off Depakote -- cont holding  -- lytes/CBC have now normalized  -- cont holding Seroquel  -- cont delirium precautions, maintain nl sleep/wake cycles  -- anticipate discharge back to memory care facility in the coming days with continued restorative cares, no plans for hospice care at this time    Concern for aspiration PNA vs pneumonitis: Treated  Intermittent fever w/ leukocytosis, unclear etiology: Improved  Acute hypoxic hypercarbic respiratory failure: Resolved  Sepsis w/ elevated lactate: Resolved  Respiratory acidosis w/ metabolic compensation: Resolved  *RRT called 4/20 AM for somnolence, congested cough, hypoxia to 84%, and borderline fever. Briefly required BiPAP and quickly weaned to O2 NC. CXR without opacity, although some concern for acute aspiration event in setting of suspected medication-mediated somnolence as above.  Was able to quickly wean off O2. Intermittent fevers and leukocytosis of unclear etiology.  UA noninfectious, no ongoing respiratory symptoms, COVID/flu negative,  CXR stable, pressure injury noted without concern for infection, no other skin changes, and MRI without acute change. LFTs mildly elevated but no apparent pain with deep abdominal palpation. No new medications. Overall unclear exact etiology of fevers and challenging assessment given underlying dementia. Completed 10 day course of empiric Zosyn 4/29 with resolution of fevers, persistent leukocytosis, no further treatment recommended by ID.  -- WBC normalized, monitor periodically    Abnormal transaminases: Stable  *AST/ALT intermittently elevated this stay. Total bili, alk phos nl.   *Holding statin. Monitor labs periodically.    Hyperparathyroidism with hypercalcemia  Hypernatremia: Improved  *Ongoing hypercalcemia noted this stay despite cinacalcet administration (held 4/23-4/26). Low suspicion that mild hypercalcemia alone is driving mental status changes. Repeat PTH slightly elevated to 73 this admission. Ionized calcium is elevated.  *Nephrology consulted 4/30 for high calcium despite cinacalcet. Cinacalcet increased, given Zometa x1 on 5/1.   *Given IVF bolus on 5/1-5/2 dt poor po intake. Lasix held. Given 500ml D5W on 5/3.   *Na and Ca slowly improved, unclear how much to correlate with slight improvement in mentation  -- cont cinacalcet 90mg daily  -- monitor BMP periodically    Hypertension  Hyperlipidemia  *PTA meds: amlodipine, clonidine, lisinopril, metoprolol, statin.   -- BPs soft on 5/6 -- will hold amlodipine, other meds continued with hold parameters  -- statin on hold dt abnl LFTs    Suspected ADAM   *No formal sleep study, although high suspicion. Outpatient sleep center referral on discharge    Deep tissue pressure injury, sacrococcygeal area, hospital-acquired   *No signs of infection. WOC RN following    FEN: IVFs dc'd 5/6, lytes otherwise stable, bite sized diet (level 6) with thin liquids (level 0)  DVT Prophylaxis: PCDs  Code Status: No CPR- Do NOT Intubate    Disposition: Condition slowly  improving off Depakote. Labs improved. More engaged, now able to feed himself as of 5/6-5/7. Discussed with patient's daughter Maria C on 5/6, conversation surrounding hospice seems less urgent now given his improvement. Anticipate discharge back to memory care/LTC in the coming days without hospice enrollment (can continue to have hospice discussions with primary provider pending overall clinical condition in the coming weeks).     Patient's daughter/co-guardian Maria C on 5/7, no answer, left brief voicemail update    Medically Ready for Discharge: Anticipated in 2-4 Days      Monse Lawson, DO    Clinically Significant Risk Factors               # Hypoalbuminemia: Lowest albumin = 2.9 g/dL at 4/28/2025  4:51 AM, will monitor as appropriate     # Hypertension: Noted on problem list     # Acute Hypercapnic Respiratory Failure: based on venous blood gas results.  Continue supplemental oxygen and ventilatory support as indicated.    # Dementia: noted on problem list            # Financial/Environmental Concerns: none           Medical Decision Making       Please see A&P for additional details of medical decision making.         Interval History   Chart reviewed.  Overnight events noted.  RRT called for worsening agitation and patient required restraints.  Condition subsequently stabilized and restraints were removed.  I saw him just before noon today, he was sitting up in the chair.  Oriented to self and the year but cannot tell me where we are.  Conversation notably tangential but he was easily redirectable and not impulsive or agitated.     -Data reviewed today: I reviewed all new labs and imaging results over the last 24 hours. I personally reviewed no images or EKG's today.    Physical Exam   Temp: 97.7  F (36.5  C) Temp src: Axillary BP: 123/69 Pulse: 62   Resp: 18 SpO2: 93 % O2 Device: None (Room air)    There were no vitals filed for this visit.  Vital Signs with Ranges  Temp:  [97.7  F (36.5  C)-99  F  (37.2  C)] 97.7  F (36.5  C)  Pulse:  [56-62] 62  Resp:  [18] 18  BP: (100-125)/(54-79) 123/69  SpO2:  [93 %] 93 %  I/O last 3 completed shifts:  In: 1077 [P.O.:1077]  Out: 200 [Urine:200]    Constitutional: Resting comfortably, alert, oriented x2 (self, location) with +situational confusion, conversation tangential,  more interactive today.   Respiratory: CTAB, no wheeze, no increased work of breathing  Cardiovascular: HRRR, no MGR, no LE edema  GI: S, NT, ND, +BS  Skin/Integumen: warm/dry  Other:      Medications   Current Facility-Administered Medications   Medication Dose Route Frequency Provider Last Rate Last Admin     Current Facility-Administered Medications   Medication Dose Route Frequency Provider Last Rate Last Admin    [Held by provider] amLODIPine (NORVASC) tablet 10 mg  10 mg Oral Daily Troy Farley MD   10 mg at 05/04/25 0858    cinacalcet (SENSIPAR) tablet 90 mg  90 mg Oral Daily NIKOLAY Gambino MD   90 mg at 05/07/25 0930    cloNIDine (CATAPRES) tablet 0.1 mg  0.1 mg Oral BID Monse Lawson DO   0.1 mg at 05/07/25 0930    FLUoxetine (PROzac) capsule 20 mg  20 mg Oral Daily Eduardo Reza PA-C   20 mg at 05/07/25 0931    lisinopril (ZESTRIL) tablet 40 mg  40 mg Oral Daily Troy Farley MD   40 mg at 05/07/25 0931    melatonin tablet 3 mg  3 mg Oral At Bedtime Oleksandr Kong MD   3 mg at 05/06/25 2141    metoprolol succinate ER (TOPROL XL) 24 hr tablet 50 mg  50 mg Oral Daily Monse Lawson DO   50 mg at 05/07/25 0935    multivitamin w/minerals (THERA-VIT-M) tablet 1 tablet  1 tablet Oral Daily Monse Lawson DO   1 tablet at 05/07/25 0930    sennosides (SENOKOT) tablet 1 tablet  1 tablet Oral Daily Armando Kern MD   1 tablet at 05/06/25 0920    sodium chloride (PF) 0.9% PF flush 3 mL  3 mL Intracatheter Q8H Jonatan Simental MD   3 mL at 05/04/25 0900       Data   Recent Labs   Lab 05/07/25  0826 05/06/25  0924 05/05/25  0842 05/04/25  161  05/04/25  0721 05/04/25  0616   WBC  --  10.7 13.1*  --  12.5*  --    HGB  --  11.5* 12.0*  --  12.6*  --    MCV  --  62* 63*  --  62*  --    PLT  --  421 454*  --  523*  --    NA  --  141 142  --   --  149*   POTASSIUM 3.5 3.6 3.7  --   --  3.9   CHLORIDE  --  105 107  --   --  111*   CO2  --  22 22  --   --  25   BUN  --  38.2* 39.1*  --   --  34.3*   CR  --  0.99 1.03  --   --  0.90   ANIONGAP  --  14 13  --   --  13   UDAY  --  9.4 10.2  --   --  11.0*   GLC  --  122* 136* 122*  --  111*   ALBUMIN  --   --  3.8  --   --   --    PROTTOTAL  --   --  7.1  --   --   --    BILITOTAL  --   --  0.9  --   --   --    ALKPHOS  --   --  118  --   --   --    ALT  --   --  66  --   --   --    AST  --   --  53*  --   --   --        No results found for this or any previous visit (from the past 24 hours).

## 2025-05-07 NOTE — PLAN OF CARE
Summary:  Originally presented with aggressive behavior from his [facility],Hx of dementia w/ aggression.  Now pt somnolent w/fever and concern of aspiration pneumonia.      Date & Time: 5/7/25 1560-6418  Orientation:  Somnolent, lethargic. NARESH Activity Level: Ax1 GB walker   Fall Risk: Yes   Behavior & Aggression: Green    Pain Management:  No sign of pain, denies  ABNL VS/O2: VSS on RA  Tele: N/A   ABNL Lab/BG: K+ 3.5; re check in AM   Diet: Soft/bite size, thin liquid - but notable to take anything  Bowel/Bladder: Incontinent at times   Skin: Scab to lower extremity, abrasion/excoriation  and redness back and rt shin. Mepilex on Sacrum/Coccyx   Drains/Devices:PIV/SL  Drains/Devices: No IV access, MD aware  Tests/Procedures: N/a  Anticipated DC Date: Pending private pay hospital bed arrival   Other Important Info:

## 2025-05-07 NOTE — PLAN OF CARE
BEH IP Unit Acuity Rating Score (UARS)  Patient is given one point for every criteria they meet.    CRITERIA SCORING   On a 72 hour hold, court hold, committed, stay of commitment, or revocation. 1    Patient LOS on BEH unit exceeds 20 days. 1  LOS: 80   Patient under guardianship, 55+, otherwise medically complex, or under age 11. 1   Suicide ideation without relief of precipitating factors. 0   Current plan for suicide. 0   Current plan for homicide. 0   Imminent risk or actual attempt to seriously harm another without relief of factors precipitating the attempt. 1   Severe dysfunction in daily living (ex: complete neglect for self care, extreme disruption in vegetative function, extreme deterioration in social interactions). 1   Recent (last 7 days) or current physical aggression in the ED or on unit. 0   Restraints or seclusion episode in past 72 hours. 0   Recent (last 7 days) or current verbal aggression, agitation, yelling, etc., while in the ED or unit. 0   Active psychosis. 1   Need for constant or near constant redirection (from leaving, from others, etc).  0   Intrusive or disruptive behaviors. 1   Patient requires 3 or more hours of individualized nursing care per 8-hour shift (i.e. for ADLs, meds, therapeutic interventions). 1   TOTAL 8        not applicable

## 2025-05-07 NOTE — PLAN OF CARE
Goal Outcome Evaluation:      Plan of Care Reviewed With: patient    Overall Patient Progress: improvingOverall Patient Progress: improving    Outcome Evaluation: Pt had 1:1 sitter this shift w/ no aggressive behavior episodes.  Up to Chair and Couch this shift.        Patient vital signs are at baseline: Yes  Patient able to ambulate as they were prior to admission or with assist devices provided by therapies during their stay:  Yes  Patient MUST void prior to discharge:  Yes  Patient able to tolerate oral intake:  Yes  Pain has adequate pain control using Oral analgesics:  Yes  Does patient have an identified :  No,  Reason:  Discharge pending  Has goal D/C date and time been discussed with patient:  No,  Reason:  Discharge pending.    Dx:Dementia w/ Increased Aggression  POD#n/a    A&Ox1 to Self Only.   CMS NARESH d/t confusion.   VSS on RA.   Up with Ax1 GB and walker.   Voiding Incontinent B/B.   No PIV Access, MD Aware.  Soft/Bite-sized Diet, Thin Liquids.  Denies Pain.

## 2025-05-07 NOTE — CODE/RAPID RESPONSE
"[facility]    House ALLEN CODE 21/Restraint Check Note  5/7/2025   Time Called: 0320     Assessment & Plan     Acute agitation, aggression 2/2 acute encephalopathy, Alzheimer's dementia, chronic neuropsychiatric disorder with behavioral disturbance.  Nursing notes pt had been sleeping when he abruptly woke up, became acutely agitated, aggressive, swinging, hitting, pushing toward staff and was stating he wanted to \"get out of here\".  Nursing notes despite attempting to redirect pt, pt remained agitated prompting CODE 21.  Pt notes he wants to go home, requesting for coffee.  Pt reports no pain, need to use restroom.    -- Minimally accepted verbal de-escalation, distraction, re-direction to mostly cooperative.  -- No injury to pt or staff.   -- All restraints applied appropriately w/o complication.   -- Restraints applied supine.  -- Bedside attendant at all times.    Restraints for Medical Healing:   -- Q24H renewal of order per primary inpatient service.    INTERVENTIONS:  - At time of arrival, nursing and security staff placing pt in 4-point soft restraints due to combativeness; will order 4-point soft restraints, requested for nursing to remove as soon as pt calm, cooperative  - Noted minimizing chemical restraints at this time; will defer administering medications at this time    Discussed with and defer further cares to nursing and hospitalist    Interval History     Estevan Aaron is a 65 year old male who was admitted on 4/4/2025 for aggressive behaviors.    Medical history significant for:   Past Medical History:   Diagnosis Date    Kidney stone     Serum calcium elevated     Tobacco use disorder     tobacco quit line referral done 4/19/06     Code Status: No CPR- Do NOT Intubate    Allergies   No Known Allergies  Physical Exam   Vital Signs with Ranges:  Temp:  [98.3  F (36.8  C)-99  F (37.2  C)] 99  F (37.2  C)  Pulse:  [56-68] 60  Resp:  [18] 18  BP: ()/(54-66) 101/56  SpO2:  " [93 %-95 %] 93 %  I/O last 3 completed shifts:  In: 1077 [P.O.:1077]  Out: 200 [Urine:200]    Constitutional: Pt lying in bed, several staff present around pt placing in restraints  Neck: No upper airway wheezes or stridor noted  Pulmonary: In no apparent respiratory distress  Cardiovascular: Appears well perfused  GI: Round, soft  Skin/Integumen: Warm, dry, pink  Neuro: Awake, alert, clear speech, confused conversation  Psych:  At time of arrival, currently calm   Extremities: No obvious peripheral edema    Medical Decision Making       20 MINUTES SPENT BY ME on the date of service doing chart review, history, exam, documentation & further activities per the note.       DILCIA Mendez CNP  Hospitalist-House ALLEN  Hospitalist Service  Securely message with MyTrainer (more info)  Text page via [facility] Paging/Directory

## 2025-05-08 ENCOUNTER — APPOINTMENT (OUTPATIENT)
Dept: GENERAL RADIOLOGY | Facility: CLINIC | Age: 65
DRG: 056 | End: 2025-05-08
Payer: MEDICARE

## 2025-05-08 VITALS
HEART RATE: 53 BPM | SYSTOLIC BLOOD PRESSURE: 126 MMHG | TEMPERATURE: 99.4 F | OXYGEN SATURATION: 95 % | DIASTOLIC BLOOD PRESSURE: 65 MMHG | RESPIRATION RATE: 18 BRPM

## 2025-05-08 LAB
ANION GAP SERPL CALCULATED.3IONS-SCNC: 12 MMOL/L (ref 7–15)
ATRIAL RATE - MUSE: 54 BPM
BACTERIA CSF CULT: NORMAL
BASE EXCESS BLDV CALC-SCNC: 0.5 MMOL/L (ref -3–3)
BUN SERPL-MCNC: 26.4 MG/DL (ref 8–23)
CALCIUM SERPL-MCNC: 9.5 MG/DL (ref 8.8–10.4)
CHLORIDE SERPL-SCNC: 104 MMOL/L (ref 98–107)
CREAT SERPL-MCNC: 0.88 MG/DL (ref 0.67–1.17)
DIASTOLIC BLOOD PRESSURE - MUSE: NORMAL MMHG
EGFRCR SERPLBLD CKD-EPI 2021: >90 ML/MIN/1.73M2
ERYTHROCYTE [DISTWIDTH] IN BLOOD BY AUTOMATED COUNT: 17.8 % (ref 10–15)
GLUCOSE SERPL-MCNC: 100 MG/DL (ref 70–99)
HCO3 BLDV-SCNC: 26 MMOL/L (ref 21–28)
HCO3 SERPL-SCNC: 23 MMOL/L (ref 22–29)
HCT VFR BLD AUTO: 35.8 % (ref 40–53)
HGB BLD-MCNC: 11 G/DL (ref 13.3–17.7)
INTERPRETATION ECG - MUSE: NORMAL
MAGNESIUM SERPL-MCNC: 1.9 MG/DL (ref 1.7–2.3)
MCH RBC QN AUTO: 18.9 PG (ref 26.5–33)
MCHC RBC AUTO-ENTMCNC: 30.7 G/DL (ref 31.5–36.5)
MCV RBC AUTO: 61 FL (ref 78–100)
O2/TOTAL GAS SETTING VFR VENT: 0 %
OXYHGB MFR BLDV: 29 % (ref 70–75)
P AXIS - MUSE: 48 DEGREES
PCO2 BLDV: 46 MM HG (ref 40–50)
PH BLDV: 7.37 [PH] (ref 7.32–7.43)
PLATELET # BLD AUTO: 386 10E3/UL (ref 150–450)
PO2 BLDV: 22 MM HG (ref 25–47)
POTASSIUM SERPL-SCNC: 3.9 MMOL/L (ref 3.4–5.3)
POTASSIUM SERPL-SCNC: 4 MMOL/L (ref 3.4–5.3)
PR INTERVAL - MUSE: 186 MS
QRS DURATION - MUSE: 106 MS
QT - MUSE: 422 MS
QTC - MUSE: 400 MS
R AXIS - MUSE: 6 DEGREES
RBC # BLD AUTO: 5.83 10E6/UL (ref 4.4–5.9)
SAO2 % BLDV: 29 % (ref 70–75)
SODIUM SERPL-SCNC: 139 MMOL/L (ref 135–145)
SYSTOLIC BLOOD PRESSURE - MUSE: NORMAL MMHG
T AXIS - MUSE: 36 DEGREES
TROPONIN T SERPL HS-MCNC: 26 NG/L
TROPONIN T SERPL HS-MCNC: 28 NG/L
VENTRICULAR RATE- MUSE: 54 BPM
WBC # BLD AUTO: 11.4 10E3/UL (ref 4–11)

## 2025-05-08 PROCEDURE — 84484 ASSAY OF TROPONIN QUANT: CPT

## 2025-05-08 PROCEDURE — 250N000013 HC RX MED GY IP 250 OP 250 PS 637: Performed by: INTERNAL MEDICINE

## 2025-05-08 PROCEDURE — 99232 SBSQ HOSP IP/OBS MODERATE 35: CPT | Performed by: NURSE PRACTITIONER

## 2025-05-08 PROCEDURE — 93010 ELECTROCARDIOGRAM REPORT: CPT | Performed by: INTERNAL MEDICINE

## 2025-05-08 PROCEDURE — 250N000013 HC RX MED GY IP 250 OP 250 PS 637: Performed by: NURSE PRACTITIONER

## 2025-05-08 PROCEDURE — 99207 PR NO BILLABLE SERVICE THIS VISIT: CPT

## 2025-05-08 PROCEDURE — 120N000001 HC R&B MED SURG/OB

## 2025-05-08 PROCEDURE — 36415 COLL VENOUS BLD VENIPUNCTURE: CPT

## 2025-05-08 PROCEDURE — 93005 ELECTROCARDIOGRAM TRACING: CPT

## 2025-05-08 PROCEDURE — 82374 ASSAY BLOOD CARBON DIOXIDE: CPT

## 2025-05-08 PROCEDURE — 99233 SBSQ HOSP IP/OBS HIGH 50: CPT | Performed by: INTERNAL MEDICINE

## 2025-05-08 PROCEDURE — 250N000013 HC RX MED GY IP 250 OP 250 PS 637: Performed by: PHYSICIAN ASSISTANT

## 2025-05-08 PROCEDURE — 36415 COLL VENOUS BLD VENIPUNCTURE: CPT | Performed by: INTERNAL MEDICINE

## 2025-05-08 PROCEDURE — 84132 ASSAY OF SERUM POTASSIUM: CPT | Performed by: INTERNAL MEDICINE

## 2025-05-08 PROCEDURE — 83735 ASSAY OF MAGNESIUM: CPT

## 2025-05-08 PROCEDURE — 250N000013 HC RX MED GY IP 250 OP 250 PS 637: Performed by: STUDENT IN AN ORGANIZED HEALTH CARE EDUCATION/TRAINING PROGRAM

## 2025-05-08 PROCEDURE — 82805 BLOOD GASES W/O2 SATURATION: CPT

## 2025-05-08 PROCEDURE — 250N000013 HC RX MED GY IP 250 OP 250 PS 637: Performed by: HOSPITALIST

## 2025-05-08 PROCEDURE — 85027 COMPLETE CBC AUTOMATED: CPT

## 2025-05-08 PROCEDURE — 71045 X-RAY EXAM CHEST 1 VIEW: CPT

## 2025-05-08 RX ORDER — GABAPENTIN 300 MG/1
300 CAPSULE ORAL AT BEDTIME
Status: DISCONTINUED | OUTPATIENT
Start: 2025-05-08 | End: 2025-05-13 | Stop reason: HOSPADM

## 2025-05-08 RX ORDER — QUETIAPINE FUMARATE 25 MG/1
25 TABLET, FILM COATED ORAL 3 TIMES DAILY PRN
Status: DISCONTINUED | OUTPATIENT
Start: 2025-05-08 | End: 2025-05-13 | Stop reason: HOSPADM

## 2025-05-08 RX ADMIN — SENNOSIDES 1 TABLET: 8.6 TABLET ORAL at 09:40

## 2025-05-08 RX ADMIN — MELATONIN TAB 3 MG 3 MG: 3 TAB at 22:17

## 2025-05-08 RX ADMIN — CLONIDINE HYDROCHLORIDE 0.1 MG: 0.1 TABLET ORAL at 09:39

## 2025-05-08 RX ADMIN — CINACALCET 90 MG: 30 TABLET ORAL at 09:38

## 2025-05-08 RX ADMIN — LISINOPRIL 40 MG: 40 TABLET ORAL at 09:38

## 2025-05-08 RX ADMIN — FLUOXETINE HYDROCHLORIDE 20 MG: 20 CAPSULE ORAL at 09:39

## 2025-05-08 RX ADMIN — GABAPENTIN 300 MG: 300 CAPSULE ORAL at 22:17

## 2025-05-08 RX ADMIN — CLONIDINE HYDROCHLORIDE 0.1 MG: 0.1 TABLET ORAL at 22:17

## 2025-05-08 RX ADMIN — METOPROLOL SUCCINATE 50 MG: 50 TABLET, EXTENDED RELEASE ORAL at 09:39

## 2025-05-08 RX ADMIN — QUETIAPINE FUMARATE 25 MG: 25 TABLET ORAL at 18:10

## 2025-05-08 RX ADMIN — Medication 1 TABLET: at 09:39

## 2025-05-08 ASSESSMENT — ACTIVITIES OF DAILY LIVING (ADL)
ADLS_ACUITY_SCORE: 72

## 2025-05-08 NOTE — CODE/RAPID RESPONSE
"[facility]  House ALLEN RRT Note  5/8/2025   Time called: 0035  RRT called for: Chest pain  Code status: No CPR- Do NOT Intubate    Assessment & Plan   Estevan Aaron is a 65 year old male with PMHx of hypertension, hyperlipidemia, hyperparathyroidism and dementia who was admitted on 4/4/2025 with aggressive behaviors at his care facility. Diagnosed with Alzheimer's dementia this hospitalization. Treated for possible aspiration pneumonia this hospitalization, pneumonitis also in differential.     I was paged to the bedside to evaluate Mr. Estevan Aaron for an acute episode of chest pain. Patient first complained of chest pain to his bedside attendant and when RN asked patient about chest pain patient pointed to his right lateral chest as being painful.    Diagnosis:  Alzheimer's dementia  Chest pain  Upon my arrival patient was supine in bed and in no acute distress. Patient was calm and cooperative. Patient seemed to answer only in the affirmative to my questions regarding pain. I first asked if he had chest pain, then if he had leg pain, then if he had abdominal pain, then if he had a headache. Patient answered in the affirmative to all of these questions. Patient was unable to localize the pain on my interview and he did not seem to have reproducible pain with palpation. Overall complaints were nonspecific but will complete a chest pain workup to rule out ACS.    On exam patient was A/O to self only, inconsistent with following commands and was not reliable with responses to questions. Lung sounds clear. Heart tones with normal S1, S2, no gallop, rub, or murmur, no reproducible pain with palpation. Abdomen slightly distended, soft, no tenderness or guarding. Pulses 2+ in extremities.     After exam patient denied pain and asked \"why would I be hurting?\" Vitals remained stable throughout. I will follow labs and place further orders as needed.    Plan:  -- EKG: sinus bradycardia, no ischemic " changes  -- CXR  -- Labs   *CBC   *BMP   *Magnesium   *VBG   *Troponin    At the conclusion of this RRT patient was hemodynamically stable and will remain on current unit    His history is significant for:  Past Medical History:   Diagnosis Date    Kidney stone     Serum calcium elevated     Tobacco use disorder     tobacco quit line referral done 4/19/06     Past Surgical History:   Procedure Laterality Date    ANESTHESIA OUT OF OR MRI N/A 10/28/2024    Procedure: 1.5 MRI Brain;  Surgeon: GENERIC ANESTHESIA PROVIDER;  Location: [facility] OR    ROTATOR CUFF REPAIR RT/LT Right 2021       Review of Systems   The 10 point Review of Systems is negative other than noted in the HPI or here.     Allergies   No Known Allergies    Physical Exam   Physical Exam  Constitutional:       General: He is not in acute distress.     Appearance: He is not diaphoretic.   HENT:      Mouth/Throat:      Mouth: Mucous membranes are moist.   Eyes:      Pupils: Pupils are equal, round, and reactive to light.   Cardiovascular:      Rate and Rhythm: Normal rate and regular rhythm.   Pulmonary:      Effort: Pulmonary effort is normal. No respiratory distress.      Breath sounds: Normal breath sounds. No wheezing.   Abdominal:      General: There is distension.      Palpations: Abdomen is soft.      Tenderness: There is no abdominal tenderness. There is no guarding.   Musculoskeletal:      Right lower leg: No edema.      Left lower leg: No edema.   Skin:     General: Skin is warm and dry.      Capillary Refill: Capillary refill takes less than 2 seconds.   Neurological:      Mental Status: He is alert. He is disoriented.   Psychiatric:         Mood and Affect: Mood normal.         Vital Signs with Ranges:  Temp:  [97.3  F (36.3  C)-98.7  F (37.1  C)] 98.3  F (36.8  C)  Pulse:  [52-63] 52  Resp:  [16-20] 20  BP: ()/(53-94) 104/64  SpO2:  [93 %-97 %] 95 %  I/O last 3 completed shifts:  In: 240 [P.O.:240]  Out: -     Data   Results for orders placed  or performed during the hospital encounter of 04/04/25 (from the past 24 hours)   Potassium   Result Value Ref Range    Potassium 3.5 3.4 - 5.3 mmol/L   CBC with platelets   Result Value Ref Range    WBC Count 11.4 (H) 4.0 - 11.0 10e3/uL    RBC Count 5.83 4.40 - 5.90 10e6/uL    Hemoglobin 11.0 (L) 13.3 - 17.7 g/dL    Hematocrit 35.8 (L) 40.0 - 53.0 %    MCV 61 (L) 78 - 100 fL    MCH 18.9 (L) 26.5 - 33.0 pg    MCHC 30.7 (L) 31.5 - 36.5 g/dL    RDW 17.8 (H) 10.0 - 15.0 %    Platelet Count 386 150 - 450 10e3/uL   Blood gas venous   Result Value Ref Range    pH Venous 7.37 7.32 - 7.43    pCO2 Venous 46 40 - 50 mm Hg    pO2 Venous 22 (L) 25 - 47 mm Hg    Bicarbonate Venous 26 21 - 28 mmol/L    Base Excess/Deficit Venous 0.5 -3.0 - 3.0 mmol/L    FIO2 0     Oxyhemoglobin Venous 29 (L) 70 - 75 %    O2 Sat, Venous 29.0 (L) 70.0 - 75.0 %    Narrative    In healthy individuals, oxyhemoglobin (O2Hb) and oxygen saturation (SO2) are approximately equal. In the presence of dyshemoglobins, oxyhemoglobin can be considerably lower than oxygen saturation.   EKG 12-lead, tracing only   Result Value Ref Range    Systolic Blood Pressure  mmHg    Diastolic Blood Pressure  mmHg    Ventricular Rate 54 BPM    Atrial Rate 54 BPM    NY Interval 186 ms    QRS Duration 106 ms     ms    QTc 400 ms    P Axis 48 degrees    R AXIS 6 degrees    T Axis 36 degrees    Interpretation ECG       Sinus bradycardia  Otherwise normal ECG  When compared with ECG of 24-Jan-2025 09:02,  Nonspecific T wave abnormality, improved in Anterolateral leads       COVID-19 PCR Results          10/31/2024    10:55 11/7/2024    12:57 12/26/2024    10:42 12/28/2024    11:38 12/30/2024    12:37 1/13/2025    14:08 1/16/2025    23:54 4/20/2025    03:16 4/26/2025    07:33   COVID-19 PCR Results   SARS CoV2 PCR Negative  Negative  Negative  Negative  Negative  Negative  Negative  Negative  Negative      COVID-19 Antibody Results, Testing for Immunity           No data to  display              EKG:  Interpreted by DILCIA Acevedo CNP  Time reviewed: 0100  Symptoms at time of EKG: None   Rhythm: normal sinus   Rate: Bradycardia  Axis: Normal  Ectopy: none  Conduction: normal  ST Segments/ T Waves: No acute ischemic changes  Q Waves: none  Comparison to prior: Unchanged  Clinical Impression: no acute changes      Time Spent on this Encounter   I spent 30 minutes on the unit/floor managing the care of Estevan Aaron. Over 50% of my time was spent counseling the patient and/or coordinating care regarding services listed in this note.    DILCIA Acevedo, CNP  Hospitalist - House [facility]MA[facility]  Text me on the [employer] yamileth for a textback

## 2025-05-08 NOTE — PROVIDER NOTIFICATION
MD Notification    Notified Person: MD    Notified Person Name: Jasmina Fernando    Notification Date/Time: 5/8/2025 0023 hours    Notification Interaction: Afsaneh     Purpose of Notification:  Patient is complaining of chest pain.

## 2025-05-08 NOTE — PROGRESS NOTES
Care Management Follow Up    Length of Stay (days): 31    Expected Discharge Date: 05/09/2025     Concerns to be Addressed: discharge planning     Patient plan of care discussed at interdisciplinary rounds: Yes    Anticipated Discharge Disposition:                Anticipated Discharge Services:    Anticipated Discharge DME:      Patient/family educated on Medicare website which has current facility and service quality ratings:    Education Provided on the Discharge Plan:    Patient/Family in Agreement with the Plan: yes    Referrals Placed by CM/SW:    Private pay costs discussed: Not applicable    Discussed  Partnership in Safe Discharge Planning  document with patient/family: No     Handoff Completed: No, handoff not indicated or clinically appropriate    Additional Information:  Writer called and spoke with patient's daughter/guardian Maria C ([phone]) to discuss discharge planning. Maria C states she has been in contact with [facility] about home equipment of a hospital bed and a gerichair. Writer spoke with Amirah from [facility] yesterday but didn't hear back. Maria C states she did hear back from [facility] and both items will be private pay and Medicare will not cover them. Maria C states her mother and her have also been in contact with patient's memory care facility [facility] to see if they can use equipment at patient's residence. No clear answer on that.    Writer asked Maria C for a  at [facility]. She states she's been talking with the  [facility] ([phone]) so writer called and left message to Vera to discuss if patient could return to his Memory care with assist of 2. Await call back from Vera and will continue to assist in discharge planning.    Next Steps: medical clearance, home vs TCU    Addendum:  Writer spoke with Vera from [facility] who states that patient is not medically stable to return to facility. She believes he needs rehab as he was  fairly independent prior to hospital stay. She mentioned family was thinking hospice but now are wanting a hospital bed and ricardo lift for him. Family is deciding if they can private pay for said equipment.    Vera states after his outbreaks at facility yelling and swearing, they were looking to evict him but writer asked if they had started the formal 30-day process of eviction and she says they have not. Vera also states that another nurse needs to come out and assess whether he's appropriate to return to facility but can't get anyone out to hospital until Monday, May 12th. She states they are not available on weekends to assess or to take admissions. Patient not medically to discharge yet but a clear discharge plan needs to be established soon. Continue POC.        Oleksandr Whitney RN  [facility]  Inpatient Care Management - FLOAT  CM RN Mobile: [phone] daily 7:30-4:00

## 2025-05-08 NOTE — CONSULTS
"    Psychiatry Consultation; Follow up          Reason for Consult, requesting source:    Agitation  Requesting source: Jonatan Simental MD            Interim history:    Estevan Aaron is a 65 year old male who was hospitalized after being sent to the emergency department from his memory care facility with increased agitation and aggressive behaviors.  Has been followed by psychiatry with several medication changes throughout his stay.  Developed significant sedation with quetiapine and Depakote, as well as an episode concerning for catatonia.  Recently, he has only been receiving fluoxetine.  Sleep has been poor over the past couple of nights, and was noted to have been awake throughout the night last night.    On interview, patient observed to be lying in his hospital bed fidgeting with cords on his bed.  He responds minimally to questions, generally with 1 word answers.  Endorses feeling \"bored.\"  He states he used to be a  and enjoyed flying.  When asked about what type of planes he flew, patient did not respond.  He denies that he has had difficulty sleeping overnight, despite nursing reports.  Endorses a good appetite, unable to recall what he ate for lunch.  Denies additional questions or concerns.         Medications:     Current Facility-Administered Medications   Medication Dose Route Frequency Provider Last Rate Last Admin    acetaminophen (TYLENOL) tablet 650 mg  650 mg Oral Q4H PRN Oleksandr Knog MD   650 mg at 04/28/25 0050    Or    acetaminophen (TYLENOL) Suppository 650 mg  650 mg Rectal Q4H PRN Oleksandr Kong MD   650 mg at 04/26/25 2314    [Held by provider] amLODIPine (NORVASC) tablet 10 mg  10 mg Oral Daily Troy Farley MD   10 mg at 05/04/25 0858    cinacalcet (SENSIPAR) tablet 90 mg  90 mg Oral Daily NIKOLAY Gambino MD   90 mg at 05/08/25 0938    cloNIDine (CATAPRES) tablet 0.1 mg  0.1 mg Oral BID Monse Lawson DO   0.1 mg at 05/08/25 0939    FLUoxetine (PROzac) capsule 20 " mg  20 mg Oral Daily Eduardo Reza PA-C   20 mg at 05/08/25 0939    gabapentin (NEURONTIN) capsule 300 mg  300 mg Oral At Bedtime Troy Short CNP        hydrALAZINE (APRESOLINE) injection 10 mg  10 mg Intravenous Q6H PRN Troy Farley MD   10 mg at 04/24/25 1948    lidocaine (LMX4) cream   Topical Q1H PRN Oleksandr Kong MD        lidocaine 1 % 0.1-1 mL  0.1-1 mL Other Q1H PRN Oleksandr Kong MD        lisinopril (ZESTRIL) tablet 40 mg  40 mg Oral Daily Troy Farley MD   40 mg at 05/08/25 0938    melatonin tablet 3 mg  3 mg Oral At Bedtime Oleksandr Kong MD   3 mg at 05/07/25 2111    metoprolol succinate ER (TOPROL XL) 24 hr tablet 50 mg  50 mg Oral Daily Monse Lawson, DO   50 mg at 05/08/25 0939    multivitamin w/minerals (THERA-VIT-M) tablet 1 tablet  1 tablet Oral Daily Monse Lawson, DO   1 tablet at 05/08/25 0939    [Held by provider] OLANZapine (zyPREXA) injection 5 mg  5 mg Intramuscular Daily PRN Evelin Nava MD   5 mg at 04/18/25 1943    ondansetron (ZOFRAN ODT) ODT tab 4 mg  4 mg Oral Q6H PRN Oleksandr Kong MD        Or    ondansetron (ZOFRAN) injection 4 mg  4 mg Intravenous Q6H PRN Oleksandr Kong MD        polyethylene glycol (MIRALAX) Packet 17 g  17 g Oral BID PRN Oleksandr Kong MD   17 g at 04/10/25 0906    prochlorperazine (COMPAZINE) injection 5 mg  5 mg Intravenous Q6H PRN Oleksandr Kong MD        Or    prochlorperazine (COMPAZINE) tablet 5 mg  5 mg Oral Q6H PRN Oleksandr Kong MD        QUEtiapine (SEROquel) tablet 25 mg  25 mg Oral TID PRN Troy Short CNP        senna-docusate (SENOKOT-S/PERICOLACE) 8.6-50 MG per tablet 1 tablet  1 tablet Oral BID PROleksandr Montes MD   1 tablet at 04/06/25 1601    Or    senna-docusate (SENOKOT-S/PERICOLACE) 8.6-50 MG per tablet 2 tablet  2 tablet Oral BID Oleksandr Champagne MD        sennosides (SENOKOT) tablet 1 tablet  1 tablet Oral Daily Armando Kern MD   1 tablet at 05/08/25 0977    sodium chloride  "(PF) 0.9% PF flush 3 mL  3 mL Intracatheter Q8H Jonatan Simental MD   3 mL at 05/08/25 0947              MSE:     Appearance: awake, alert, appeared as age stated, and unkempt  Attitude:  guarded and somewhat cooperative  Eye Contact:  poor   Mood:  \"Bored\"  Affect:  mood congruent and intensity is blunted  Speech:  clear, coherent, impoverished  Psychomotor Behavior:  no evidence of tardive dyskinesia, dystonia, or tics  Thought Process:  paucity of thought, linear  Associations:  no loose associations  Thought Content:  no evidence of suicidal ideation or homicidal ideation and no evidence of psychotic thought  Insight:  limited  Judgement:  poor  Oriented to:  self  Attention Span and Concentration:  fair  Recent and Remote Memory:  poor  Language: able to name/identify objects without impairment  Fund of Knowledge: intact with awareness of current and past events    Vital signs:  Temp: 97.5  F (36.4  C) Temp src: Oral BP: 104/56 Pulse: 67   Resp: 18 SpO2: 95 % O2 Device: None (Room air) Oxygen Delivery: 1 LPM      Estimated body mass index is 40.9 kg/m  as calculated from the following:    Height as of 10/10/24: 1.676 m (5' 6\").    Weight as of 2/23/25: 114.9 kg (253 lb 6.4 oz).              DSM-5 Diagnosis:   Major neurocognitive disorder with behavioral disturbance          Assessment:   Patient seen for follow-up today.  He is observed to be lying in his hospital bed fidgeting with cords attached to his bed.  Affect is blunted and he does not offer much.  Appears he has not slept well over the last couple nights and last night was noted to be awake throughout the night.          Summary of Recommendations:   1) trial gabapentin 300 mg nightly off-label for sleep.  Appears patient has not slept well over the past couple of nights with increased agitation at night.    2) Will restart quetiapine 25 mg 3 times daily as needed for severe agitation  3) appears scheduled Depakote and quetiapine had led to " significant sedation.  There is also some concern for development of catatonia with antipsychotics so should be used sparingly and at low doses  4) if possible, attempt to provide patient with tasks and stimulation during the day  5) work toward transition back to long-term care as soon as is feasible      Kapil Short, SRINIVASAP-BC, APRN, CNP  Consult/Liaison Psychiatry  [facility]  Provider can be paged via [employer]  If I am unavailable, please contact [facility] at [phone] to reach the on-call provider.

## 2025-05-08 NOTE — PROGRESS NOTES
[facility]    Hospitalist Progress Note    Interval History   - Note RRT overnight for chest pain. He is restless and impulsive, he has a 1:1 on. May benefit from some lower dose antipsychotics. Will request Psychiatry consult and defer to them    Assessment & Plan   Summary: Estevan Aaron is a 65 year old male with PMH  hypertension, hyperlipidemia, hyperparathyroidism and dementia who was admitted on 4/4/2025 with aggressive behaviors at his care facility.     Acute toxic metabolic encephalopathy, multifactorial  Hospital-acquired delirium  Chronic neuropsychiatric disorder with behavioral disturbance, unclear etiology  Alzheimer's dementia  Patient carries diagnosis of Alzheimer's dementia, although has significant history of underlying psychiatric disease and concern for paranoid personality disorder. Patient's ex-wife/guardian describes a rather accelerated mental status decline beginning in late 2024. Had a prolonged inpatient psychiatric hospitalization 10/2024-2/2025.  At that time, suspected multifactorial encephalopathy dt delirium, psychoactive medication effects, and undifferentiated neuropsychiatric disorder.  Prior to admission, patient was ambulating independently at [facility]. Reportedly exhibited aggressive behaviors at [facility], prompting referral to ED for evaluation. In the ED, patient was calm and cooperative with staff, although has since had significant behavioral disturbances after admission. Has been seen by psych this stay, assisted with medication titration, however, patient developed severe encephalopathy with persistence somnolence.  Had a period of transient improvement in his mentation which was followed by subsequent decline with worsening somnolence noted on 4/24.  Etiology of persistent encephalopathy and worsening functional status unclear (began requiring Winnie lift, assist of 2; not following commands; had nonsensical speech). Considered  occult infection (see fever below), consider rare neurological disorder (LP done 4/29), consider prolonged delirium, and consider progressive dementia.  Additional neurologic workup pursued.  MRI of brain this day was negative for acute changes. Mildly elevated ESR/CRP this stay. Thyroid studies generally unremarkable. EEG obtained 4/26 was consistent with encephalopathy, no seizures. LP done 4/29, was negative for CNS infection. MRI cervical spine negative on 4/29. Paraneoplastic panel neg. Alzheimer's evaluation ([id number]) done on CSF and was elevated -- consistent with Alzheimer's.  Psych attempted an Ativan challenge for treatment of possible catatonia, this resulted in increased somnolence making catatonia less likely culprit.  Clinical picture ultimately appeared suggestive of underlying Alzheimer's dementia and superimposed delirium; unsure how much medication effect of Depakote and electrolyte disturbances may have also be contributing  Discussed with neuro on 5/4 -- held Depakote beginning 5/4 and monitor response. Mentation/overall condition continues to improve off Depakote -- cont holding   Note RRT 5/7 and 5/8 for agitation, restlessness.  - Cont holding Seroquel  - Reconsult Psychiatry 5/8 for management of agitation  - Cont delirium precautions, maintain nl sleep/wake cycles  - Anticipate discharge back to memory care facility in the coming days with continued restorative cares, no plans for hospice care at this time    Concern for aspiration PNA vs pneumonitis: Treated  Intermittent fever w/ leukocytosis, unclear etiology: Improved  Acute hypoxic hypercarbic respiratory failure: Resolved  Sepsis w/ elevated lactate: Resolved  Respiratory acidosis w/ metabolic compensation: Resolved  RRT called 4/20 AM for somnolence, congested cough, hypoxia to 84%, and borderline fever. Briefly required BiPAP and quickly weaned to O2 NC. CXR without opacity, although some concern for acute aspiration event in  setting of suspected medication-mediated somnolence as above.  Was able to quickly wean off O2. Intermittent fevers and leukocytosis of unclear etiology.  UA noninfectious, no ongoing respiratory symptoms, COVID/flu negative, CXR stable, pressure injury noted without concern for infection, no other skin changes, and MRI without acute change. LFTs mildly elevated but no apparent pain with deep abdominal palpation. No new medications. Overall unclear exact etiology of fevers and challenging assessment given underlying dementia. Completed 10 day course of empiric Zosyn 4/29 with resolution of fevers, persistent leukocytosis, no further treatment recommended by ID.  -- WBC normalized, monitor periodically    Abnormal transaminases: Stable  AST/ALT intermittently elevated this stay. Total bili, alk phos nl. Holding statin. Monitor labs periodically.    Hyperparathyroidism with hypercalcemia  Hypernatremia: Improved  Ongoing hypercalcemia noted this stay despite cinacalcet administration (held 4/23-4/26). Low suspicion that mild hypercalcemia alone is driving mental status changes. Repeat PTH slightly elevated to 73 this admission. Ionized calcium is elevated.  Nephrology consulted 4/30 for high calcium despite cinacalcet. Cinacalcet increased, given Zometa x1 on 5/1.   Given IVF bolus on 5/1-5/2 dt poor po intake. Lasix held. Given 500ml D5W on 5/3. Na and Ca slowly improved, unclear how much to correlate with slight improvement in mentation  - Cont cinacalcet 90mg daily  - Monitor BMP periodically    Hypertension  Hyperlipidemia  PTA meds: amlodipine, clonidine, lisinopril, metoprolol, statin.   - BPs soft on 5/6 -- will hold amlodipine, other meds continued with hold parameters  - statin on hold dt abnl LFTs    Suspected ADAM   No formal sleep study, although high suspicion. Outpatient sleep center referral on discharge    Deep tissue pressure injury, sacrococcygeal area, hospital-acquired   No signs of infection. WOC  RN following    Clinically Significant Risk Factors               # Hypoalbuminemia: Lowest albumin = 2.9 g/dL at 4/28/2025  4:51 AM, will monitor as appropriate     # Hypertension: Noted on problem list     # Acute Hypercapnic Respiratory Failure: based on venous blood gas results.  Continue supplemental oxygen and ventilatory support as indicated.  # Dementia: noted on problem list            # Financial/Environmental Concerns: none          PT/OT: ordered  Diet: Snacks/Supplements Adult: Ensure Enlive; With Meals  Room Service  Combination Diet Safe Tray - NO utensils; Soft and Bite Sized Diet (level 6); Thin Liquids (level 0)  Snacks/Supplements Adult: Expedite Bottle; With Meals    DVT Prophylaxis: Pneumatic Compression Devices  Alberto Catheter: Not present  Lines: None     Cardiac Monitoring: None  Code Status: No CPR- Do NOT Intubate    Medically Ready for Discharge: Anticipated Tomorrow  Anticipate discharge back to memory care/LTC in the coming days without hospice enrollment (can continue to have hospice discussions with primary provider pending overall clinical condition in the coming weeks).   Patient is more agitated than before and this will need to improve first before discharge back to memory care, hopefully 1-2 days.    Jonatan Simental MD  Hospitalist Service  [facility]  Securely message with [employer] (more info)  Text page via [employer] Paging/Directory     - Total time spent on encounter is 40 minutes, which includes counseling, coordination of care, time spent discussing with family, and/or time spent discussing with consultants.    Data reviewed today: I reviewed all new labs and imaging results over the last 24 hours.    Physical Exam   Temp: 99.7  F (37.6  C) Temp src: Oral BP: 105/65 Pulse: 64   Resp: 18 SpO2: 95 % O2 Device: None (Room air)    There were no vitals filed for this visit.  Vital Signs with Ranges  Temp:  [97.3  F (36.3  C)-99.7  F (37.6  C)] 99.7  F  (37.6  C)  Pulse:  [52-64] 64  Resp:  [16-20] 18  BP: ()/(53-94) 105/65  SpO2:  [95 %-97 %] 95 %  I/O last 3 completed shifts:  In: 240 [P.O.:240]  Out: -   O2 requirements: none    Constitutional: Male appears tired  HEENT: Eyes nonicteric  Cardiovascular: RRR, normal S1/2, no m/r/g  Respiratory: CTAB, no wheezing or crackles  Vascular: No LE pitting edema  GI: Normoactive bowel sounds, nontender  Neuro/Psych: A&O to self, moves all extremities    Medications   Current Facility-Administered Medications   Medication Dose Route Frequency Provider Last Rate Last Admin     Current Facility-Administered Medications   Medication Dose Route Frequency Provider Last Rate Last Admin    [Held by provider] amLODIPine (NORVASC) tablet 10 mg  10 mg Oral Daily Troy Farley MD   10 mg at 05/04/25 0858    cinacalcet (SENSIPAR) tablet 90 mg  90 mg Oral Daily NIKOLAY Gambino MD   90 mg at 05/08/25 0938    cloNIDine (CATAPRES) tablet 0.1 mg  0.1 mg Oral BID Monse Lawson DO   0.1 mg at 05/08/25 0939    FLUoxetine (PROzac) capsule 20 mg  20 mg Oral Daily Eduardo Reza PA-C   20 mg at 05/08/25 0939    lisinopril (ZESTRIL) tablet 40 mg  40 mg Oral Daily Troy Farley MD   40 mg at 05/08/25 0938    melatonin tablet 3 mg  3 mg Oral At Bedtime Oleksandr Kong MD   3 mg at 05/07/25 2111    metoprolol succinate ER (TOPROL XL) 24 hr tablet 50 mg  50 mg Oral Daily Monse Lawson DO   50 mg at 05/08/25 0939    multivitamin w/minerals (THERA-VIT-M) tablet 1 tablet  1 tablet Oral Daily Monse Lawson DO   1 tablet at 05/08/25 0939    sennosides (SENOKOT) tablet 1 tablet  1 tablet Oral Daily Armando Kern MD   1 tablet at 05/08/25 0940    sodium chloride (PF) 0.9% PF flush 3 mL  3 mL Intracatheter Q8H Jonatan Simental MD   3 mL at 05/08/25 0947       Data   Recent Labs   Lab 05/08/25  0831 05/08/25  0047 05/07/25  0826 05/06/25  0924 05/05/25  0842   WBC  --  11.4*  --  10.7 13.1*   HGB   --  11.0*  --  11.5* 12.0*   MCV  --  61*  --  62* 63*   PLT  --  386  --  421 454*   NA  --  139  --  141 142   POTASSIUM 3.9 4.0 3.5 3.6 3.7   CHLORIDE  --  104  --  105 107   CO2  --  23  --  22 22   BUN  --  26.4*  --  38.2* 39.1*   CR  --  0.88  --  0.99 1.03   ANIONGAP  --  12  --  14 13   UDAY  --  9.5  --  9.4 10.2   GLC  --  100*  --  122* 136*   ALBUMIN  --   --   --   --  3.8   PROTTOTAL  --   --   --   --  7.1   BILITOTAL  --   --   --   --  0.9   ALKPHOS  --   --   --   --  118   ALT  --   --   --   --  66   AST  --   --   --   --  53*       Imaging:   Recent Results (from the past 24 hours)   XR Chest Port 1 View    Narrative    EXAM: XR CHEST PORT 1 VIEW  LOCATION: [facility]  DATE: 5/8/2025    INDICATION: Chest pain.  COMPARISON: Portable chest single view 4/25/2025 at 1659 hours.      Impression    IMPRESSION: Mild elevation of left hemidiaphragm. Strands of platelike atelectasis left base. Right lung clear. No adenopathy or effusion. Cardiac size approaches upper limits of normal with normal pulmonary vascularity. Atherosclerotic thoracic aorta.   Degenerative changes both shoulders and the spine. Monitoring leads have been placed overlying the chest.

## 2025-05-08 NOTE — PLAN OF CARE
Goal Outcome Evaluation:    Overall Patient Progress: improving    Date/Time 5/7/2025 [id number] hours    Trauma/Ortho/Medical (Choose one) Medical    Diagnosis: Alzheimer's dementia with aggression  POD#: N/A  Mental Status: A&O only to self  Activity/dangle: Assist of 1  Diet: Soft and bite sized diet; Thin liquids (level 0)  Pain: denies pain  Alberto/Voiding: bathroom  Tele/Restraints/Iso: N/A  02/LDA: TONY SANCHEZ  D/C Date: TBD  Other Info: RRT for chest pain. Patient up all night. Patient was calm but restless.

## 2025-05-09 LAB — POTASSIUM SERPL-SCNC: 4.6 MMOL/L (ref 3.4–5.3)

## 2025-05-09 PROCEDURE — 36415 COLL VENOUS BLD VENIPUNCTURE: CPT | Performed by: HOSPITALIST

## 2025-05-09 PROCEDURE — 250N000013 HC RX MED GY IP 250 OP 250 PS 637: Performed by: NURSE PRACTITIONER

## 2025-05-09 PROCEDURE — 250N000013 HC RX MED GY IP 250 OP 250 PS 637: Performed by: INTERNAL MEDICINE

## 2025-05-09 PROCEDURE — 250N000013 HC RX MED GY IP 250 OP 250 PS 637: Performed by: PHYSICIAN ASSISTANT

## 2025-05-09 PROCEDURE — 84132 ASSAY OF SERUM POTASSIUM: CPT | Performed by: HOSPITALIST

## 2025-05-09 PROCEDURE — 250N000013 HC RX MED GY IP 250 OP 250 PS 637: Performed by: STUDENT IN AN ORGANIZED HEALTH CARE EDUCATION/TRAINING PROGRAM

## 2025-05-09 PROCEDURE — 120N000001 HC R&B MED SURG/OB

## 2025-05-09 PROCEDURE — 250N000013 HC RX MED GY IP 250 OP 250 PS 637: Performed by: HOSPITALIST

## 2025-05-09 PROCEDURE — 99231 SBSQ HOSP IP/OBS SF/LOW 25: CPT | Performed by: INTERNAL MEDICINE

## 2025-05-09 RX ADMIN — MELATONIN TAB 3 MG 3 MG: 3 TAB at 21:24

## 2025-05-09 RX ADMIN — CINACALCET 90 MG: 30 TABLET ORAL at 08:55

## 2025-05-09 RX ADMIN — SENNOSIDES 1 TABLET: 8.6 TABLET ORAL at 08:55

## 2025-05-09 RX ADMIN — Medication 1 TABLET: at 08:55

## 2025-05-09 RX ADMIN — GABAPENTIN 300 MG: 300 CAPSULE ORAL at 21:24

## 2025-05-09 RX ADMIN — CLONIDINE HYDROCHLORIDE 0.1 MG: 0.1 TABLET ORAL at 21:24

## 2025-05-09 RX ADMIN — CLONIDINE HYDROCHLORIDE 0.1 MG: 0.1 TABLET ORAL at 08:55

## 2025-05-09 RX ADMIN — LISINOPRIL 40 MG: 40 TABLET ORAL at 08:55

## 2025-05-09 RX ADMIN — QUETIAPINE FUMARATE 25 MG: 25 TABLET ORAL at 22:20

## 2025-05-09 RX ADMIN — FLUOXETINE HYDROCHLORIDE 20 MG: 20 CAPSULE ORAL at 08:55

## 2025-05-09 ASSESSMENT — ACTIVITIES OF DAILY LIVING (ADL)
ADLS_ACUITY_SCORE: 72
ADLS_ACUITY_SCORE: 70
ADLS_ACUITY_SCORE: 72
ADLS_ACUITY_SCORE: 70
ADLS_ACUITY_SCORE: 72
ADLS_ACUITY_SCORE: 72
ADLS_ACUITY_SCORE: 70
ADLS_ACUITY_SCORE: 72
ADLS_ACUITY_SCORE: 70
ADLS_ACUITY_SCORE: 72

## 2025-05-09 NOTE — PROGRESS NOTES
[facility]    Hospitalist Progress Note    Interval History   - Appears seroquel yesterday evening helped, vanessa he continues to require 1:1 for impulsitivity and restlessness today. Continue seroquel PRN as started by psych yesterday    Assessment & Plan   Summary: Estevan Aaron is a 65 year old male with PMH  hypertension, hyperlipidemia, hyperparathyroidism and dementia who was admitted on 4/4/2025 with aggressive behaviors at his care facility.     Acute toxic metabolic encephalopathy, multifactorial  Hospital-acquired delirium  Chronic neuropsychiatric disorder with behavioral disturbance, unclear etiology  Alzheimer's dementia  Patient carries diagnosis of Alzheimer's dementia, although has significant history of underlying psychiatric disease and concern for paranoid personality disorder. Patient's ex-wife/guardian describes a rather accelerated mental status decline beginning in late 2024. Had a prolonged inpatient psychiatric hospitalization 10/2024-2/2025.  At that time, suspected multifactorial encephalopathy dt delirium, psychoactive medication effects, and undifferentiated neuropsychiatric disorder.  Prior to admission, patient was ambulating independently at [facility]. Reportedly exhibited aggressive behaviors at [facility], prompting referral to ED for evaluation. In the ED, patient was calm and cooperative with staff, although has since had significant behavioral disturbances after admission. Has been seen by psych this stay, assisted with medication titration, however, patient developed severe encephalopathy with persistence somnolence.  Had a period of transient improvement in his mentation which was followed by subsequent decline with worsening somnolence noted on 4/24.  Etiology of persistent encephalopathy and worsening functional status unclear (began requiring Winnie lift, assist of 2; not following commands; had nonsensical speech). Considered occult  infection (see fever below), consider rare neurological disorder (LP done 4/29), consider prolonged delirium, and consider progressive dementia.  Additional neurologic workup pursued.  MRI of brain this day was negative for acute changes. Mildly elevated ESR/CRP this stay. Thyroid studies generally unremarkable. EEG obtained 4/26 was consistent with encephalopathy, no seizures. LP done 4/29, was negative for CNS infection. MRI cervical spine negative on 4/29. Paraneoplastic panel neg. Alzheimer's evaluation ([id number]) done on CSF and was elevated -- consistent with Alzheimer's.  Psych attempted an Ativan challenge for treatment of possible catatonia, this resulted in increased somnolence making catatonia less likely culprit.  Clinical picture ultimately appeared suggestive of underlying Alzheimer's dementia and superimposed delirium; unsure how much medication effect of Depakote and electrolyte disturbances may have also be contributing  Discussed with neuro on 5/4 -- held Depakote beginning 5/4 and monitor response. Mentation/overall condition continues to improve off Depakote -- cont holding   Note RRT 5/7 and 5/8 for agitation, restlessness.  - Cont holding Seroquel  - Reconsult Psychiatry 5/8 for management of agitation  - Cont delirium precautions, maintain nl sleep/wake cycles  - Anticipate discharge back to memory care facility in the coming days with continued restorative cares, no plans for hospice care at this time    Concern for aspiration PNA vs pneumonitis: Treated  Intermittent fever w/ leukocytosis, unclear etiology: Improved  Acute hypoxic hypercarbic respiratory failure: Resolved  Sepsis w/ elevated lactate: Resolved  Respiratory acidosis w/ metabolic compensation: Resolved  RRT called 4/20 AM for somnolence, congested cough, hypoxia to 84%, and borderline fever. Briefly required BiPAP and quickly weaned to O2 NC. CXR without opacity, although some concern for acute aspiration event in setting  of suspected medication-mediated somnolence as above.  Was able to quickly wean off O2. Intermittent fevers and leukocytosis of unclear etiology.  UA noninfectious, no ongoing respiratory symptoms, COVID/flu negative, CXR stable, pressure injury noted without concern for infection, no other skin changes, and MRI without acute change. LFTs mildly elevated but no apparent pain with deep abdominal palpation. No new medications. Overall unclear exact etiology of fevers and challenging assessment given underlying dementia. Completed 10 day course of empiric Zosyn 4/29 with resolution of fevers, persistent leukocytosis, no further treatment recommended by ID.  -- WBC normalized, monitor periodically    Abnormal transaminases: Stable  AST/ALT intermittently elevated this stay. Total bili, alk phos nl. Holding statin. Monitor labs periodically.    Hyperparathyroidism with hypercalcemia  Hypernatremia: Improved  Ongoing hypercalcemia noted this stay despite cinacalcet administration (held 4/23-4/26). Low suspicion that mild hypercalcemia alone is driving mental status changes. Repeat PTH slightly elevated to 73 this admission. Ionized calcium is elevated.  Nephrology consulted 4/30 for high calcium despite cinacalcet. Cinacalcet increased, given Zometa x1 on 5/1.   Given IVF bolus on 5/1-5/2 dt poor po intake. Lasix held. Given 500ml D5W on 5/3. Na and Ca slowly improved, unclear how much to correlate with slight improvement in mentation  - Cont cinacalcet 90mg daily  - Monitor BMP periodically    Hypertension  Hyperlipidemia  PTA meds: amlodipine, clonidine, lisinopril, metoprolol, statin.   - BPs soft on 5/6 -- will hold amlodipine, other meds continued with hold parameters  - statin on hold dt abnl LFTs    Suspected ADAM   No formal sleep study, although high suspicion. Outpatient sleep center referral on discharge    Deep tissue pressure injury, sacrococcygeal area, hospital-acquired   No signs of infection. WOC RN  following    Clinically Significant Risk Factors               # Hypoalbuminemia: Lowest albumin = 2.9 g/dL at 4/28/2025  4:51 AM, will monitor as appropriate     # Hypertension: Noted on problem list     # Acute Hypercapnic Respiratory Failure: based on venous blood gas results.  Continue supplemental oxygen and ventilatory support as indicated.    # Dementia: noted on problem list            # Financial/Environmental Concerns: none          PT/OT: ordered  Diet: Snacks/Supplements Adult: Ensure Enlive; With Meals  Room Service  Combination Diet Safe Tray - NO utensils; Soft and Bite Sized Diet (level 6); Thin Liquids (level 0)  Snacks/Supplements Adult: Expedite Bottle; With Meals    DVT Prophylaxis: Pneumatic Compression Devices  Alberto Catheter: Not present  Lines: None     Cardiac Monitoring: None  Code Status: No CPR- Do NOT Intubate    Medically Ready for Discharge: Anticipated Tomorrow  Anticipate discharge back to memory care/LTC in the coming days without hospice enrollment (can continue to have hospice discussions with primary provider pending overall clinical condition in the coming weeks).     Jonatan Simental MD  Hospitalist Service  [facility]  Securely message with [employer]oc[employer] (more info)  Text page via [employer] Paging/Directory     Data reviewed today: I reviewed all new labs and imaging results over the last 24 hours.    Physical Exam   Temp: 98.6  F (37  C) Temp src: Oral BP: 108/65 Pulse: 54   Resp: 20 SpO2: 95 % O2 Device: None (Room air)    There were no vitals filed for this visit.  Vital Signs with Ranges  Temp:  [97.5  F (36.4  C)-99.4  F (37.4  C)] 98.6  F (37  C)  Pulse:  [50-67] 54  Resp:  [18-20] 20  BP: (104-133)/(56-67) 108/65  SpO2:  [93 %-95 %] 95 %  No intake/output data recorded.  O2 requirements: none    Constitutional: Male appears tired  HEENT: Eyes nonicteric  Cardiovascular: RRR, normal S1/2, no m/r/g  Respiratory: CTAB, no wheezing or crackles  Vascular:  No LE pitting edema  GI: Normoactive bowel sounds, nontender  Neuro/Psych: A&O to self, moves all extremities    Medications   Current Facility-Administered Medications   Medication Dose Route Frequency Provider Last Rate Last Admin     Current Facility-Administered Medications   Medication Dose Route Frequency Provider Last Rate Last Admin    [Held by provider] amLODIPine (NORVASC) tablet 10 mg  10 mg Oral Daily Troy Farley MD   10 mg at 05/04/25 0858    cinacalcet (SENSIPAR) tablet 90 mg  90 mg Oral Daily NIKOLAY Gambino MD   90 mg at 05/09/25 0855    cloNIDine (CATAPRES) tablet 0.1 mg  0.1 mg Oral BID Monse Lawson DO   0.1 mg at 05/09/25 0855    FLUoxetine (PROzac) capsule 20 mg  20 mg Oral Daily Eduardo Reza PA-C   20 mg at 05/09/25 0855    gabapentin (NEURONTIN) capsule 300 mg  300 mg Oral At Bedtime Troy Short CNP   300 mg at 05/08/25 2217    lisinopril (ZESTRIL) tablet 40 mg  40 mg Oral Daily Troy Farley MD   40 mg at 05/09/25 0855    melatonin tablet 3 mg  3 mg Oral At Bedtime Oleksandr Kong MD   3 mg at 05/08/25 2217    metoprolol succinate ER (TOPROL XL) 24 hr tablet 50 mg  50 mg Oral Daily Monse Lawson DO   50 mg at 05/08/25 0939    multivitamin w/minerals (THERA-VIT-M) tablet 1 tablet  1 tablet Oral Daily Monse Lawson DO   1 tablet at 05/09/25 0855    sennosides (SENOKOT) tablet 1 tablet  1 tablet Oral Daily Armando Kern MD   1 tablet at 05/09/25 0855    sodium chloride (PF) 0.9% PF flush 3 mL  3 mL Intracatheter Q8H Jonatan Simental MD   3 mL at 05/09/25 0856       Data   Recent Labs   Lab 05/09/25  0830 05/08/25  0831 05/08/25  0047 05/07/25  0826 05/06/25  0924 05/05/25  0842   WBC  --   --  11.4*  --  10.7 13.1*   HGB  --   --  11.0*  --  11.5* 12.0*   MCV  --   --  61*  --  62* 63*   PLT  --   --  386  --  421 454*   NA  --   --  139  --  141 142   POTASSIUM 4.6 3.9 4.0   < > 3.6 3.7   CHLORIDE  --   --  104  --  105 107   CO2   --   --  23  --  22 22   BUN  --   --  26.4*  --  38.2* 39.1*   CR  --   --  0.88  --  0.99 1.03   ANIONGAP  --   --  12  --  14 13   UDAY  --   --  9.5  --  9.4 10.2   GLC  --   --  100*  --  122* 136*   ALBUMIN  --   --   --   --   --  3.8   PROTTOTAL  --   --   --   --   --  7.1   BILITOTAL  --   --   --   --   --  0.9   ALKPHOS  --   --   --   --   --  118   ALT  --   --   --   --   --  66   AST  --   --   --   --   --  53*    < > = values in this interval not displayed.       Imaging:   No results found for this or any previous visit (from the past 24 hours).

## 2025-05-09 NOTE — PLAN OF CARE
Goal Outcome Evaluation:      Diagnosis: Dementia with Aggressive behavior   POD#:NA  Mental Status:  NARESH  Activity/dangle Not out of bed this shift  Diet: Soft bite size, thin liquid   Pain: Denied   Alberto/Voiding: incontinent  Tele/Restraints/Iso:NA   02/LDA: On RA, PIV SL  D/C Date: TBD  Other Info: Sitter at bedside. On Potassium recheck in Am.  Pt slept most of the night and says he is tired.

## 2025-05-09 NOTE — PLAN OF CARE
Goal Outcome Evaluation:  Acute toxic metabolic encephalopathy, multifactorial  Hospital-acquired delirium  Chronic neuropsychiatric disorder with behavioral disturbance, unclear etiology  Alzheimer's dementiaConcern for aspiration PNA vs pneumonitis: Treated  Intermittent fever w/ leukocytosis, unclear etiology: Improved  Acute hypoxic hypercarbic respiratory failure: Resolved  Sepsis w/ elevated lactate: Resolved  Respiratory acidosis w/ metabolic compensation: Resolved     Orientation: A/O ONLY self oriented, today mostly  cooperative and calm with us, intermitting confused episode at times. Otherwise more calm/pleasant. 1.1 bedside setter    Vitals/Tele: vss on R A     IV Access/drains: P  IV SL     Diet: mechanical soft diet with good appetite     Mobility:  x1 B,G/W     GI/: Incontinent      Wound/Skin: scatterd and scabs and small redness on buttocks     Consults: Psychiatry     Discharge Plan: TBD      See Flow sheets for assessment

## 2025-05-09 NOTE — PLAN OF CARE
Goal Outcome Evaluation:      Diagnosis: Dementia with Aggressive behavior   POD#:NA  Mental Status:  NARESH  Activity/dangle Not out of bed this shift  Diet: Soft bite size, thin liquid   Pain: Denied   Alberto/Voiding: incontinent  Tele/Restraints/Iso:NA   02/LDA: On RA, PIV SL  D/C Date: TBD  Other Info: Sitter at bedside. On Potassium protocol. Pt slept most of the night and says he is tired.

## 2025-05-09 NOTE — PLAN OF CARE
Goal Outcome Evaluation:       Pt Alert to self, baseline dementia. VSS on RA. Soft/Bite sized diet, good apetite. Up SBA, voiding in BR/incontinent at times. Sitter at bedside. Denies pain. WOC cares done. L RAVI SL. Psych following. Discharge to LTC pending medical stability, Continue plan of Care.

## 2025-05-09 NOTE — PROGRESS NOTES
Diagnosis:***  POD#:NA  Mental Status: A  Activity/dangle***  Diet:   Pain: Denied   Alberto/Voiding:***  Tele/Restraints/Iso:***  02/LDA: On RA,   D/C Date: TBD  Other Info: Sitter at bedside

## 2025-05-10 PROCEDURE — 120N000001 HC R&B MED SURG/OB

## 2025-05-10 PROCEDURE — 250N000013 HC RX MED GY IP 250 OP 250 PS 637: Performed by: INTERNAL MEDICINE

## 2025-05-10 PROCEDURE — 250N000013 HC RX MED GY IP 250 OP 250 PS 637: Performed by: HOSPITALIST

## 2025-05-10 PROCEDURE — 250N000013 HC RX MED GY IP 250 OP 250 PS 637: Performed by: STUDENT IN AN ORGANIZED HEALTH CARE EDUCATION/TRAINING PROGRAM

## 2025-05-10 PROCEDURE — 250N000013 HC RX MED GY IP 250 OP 250 PS 637: Performed by: PHYSICIAN ASSISTANT

## 2025-05-10 PROCEDURE — 250N000013 HC RX MED GY IP 250 OP 250 PS 637: Performed by: NURSE PRACTITIONER

## 2025-05-10 PROCEDURE — 99232 SBSQ HOSP IP/OBS MODERATE 35: CPT | Performed by: INTERNAL MEDICINE

## 2025-05-10 RX ADMIN — CLONIDINE HYDROCHLORIDE 0.1 MG: 0.1 TABLET ORAL at 09:45

## 2025-05-10 RX ADMIN — SENNOSIDES 1 TABLET: 8.6 TABLET ORAL at 09:44

## 2025-05-10 RX ADMIN — QUETIAPINE 12.5 MG: 25 TABLET, FILM COATED ORAL at 21:20

## 2025-05-10 RX ADMIN — FLUOXETINE HYDROCHLORIDE 20 MG: 20 CAPSULE ORAL at 09:45

## 2025-05-10 RX ADMIN — MELATONIN TAB 3 MG 3 MG: 3 TAB at 21:20

## 2025-05-10 RX ADMIN — Medication 1 TABLET: at 09:44

## 2025-05-10 RX ADMIN — CINACALCET 90 MG: 30 TABLET ORAL at 09:45

## 2025-05-10 RX ADMIN — CLONIDINE HYDROCHLORIDE 0.1 MG: 0.1 TABLET ORAL at 21:20

## 2025-05-10 RX ADMIN — LISINOPRIL 40 MG: 40 TABLET ORAL at 09:45

## 2025-05-10 RX ADMIN — GABAPENTIN 300 MG: 300 CAPSULE ORAL at 21:20

## 2025-05-10 ASSESSMENT — ACTIVITIES OF DAILY LIVING (ADL)
ADLS_ACUITY_SCORE: 69
ADLS_ACUITY_SCORE: 70
ADLS_ACUITY_SCORE: 69
ADLS_ACUITY_SCORE: 70
ADLS_ACUITY_SCORE: 69
ADLS_ACUITY_SCORE: 70
ADLS_ACUITY_SCORE: 68
ADLS_ACUITY_SCORE: 68
ADLS_ACUITY_SCORE: 70
ADLS_ACUITY_SCORE: 69
ADLS_ACUITY_SCORE: 68
ADLS_ACUITY_SCORE: 69
ADLS_ACUITY_SCORE: 70
ADLS_ACUITY_SCORE: 68
ADLS_ACUITY_SCORE: 70

## 2025-05-10 NOTE — PLAN OF CARE
"Goal Outcome Evaluation:           Diagnosis: Dementia with Aggressive behavior   POD#:NA  Mental Status:  Alert to self. Pt responds to his name.  Activity/dangle pt slept on the cough.  Diet: Soft bite size, thin liquid   Pain: Denied   Alberto/Voiding: incontinent  Tele/Restraints/Iso:NA   02/LDA: On RA, PIV SL  D/C Date: TBD  Other Info: Sitter at bedside. On Potassium protocol.   Pt became agitated, squeezed Rn hand, kicked out RN and nursing aid out of the room at one point.   MD notified and responded \"continue to monitor\". Finally, pt was given Seroquel 25 mg.   Psych is following.             "

## 2025-05-10 NOTE — PROGRESS NOTES
[facility]    Hospitalist Progress Note    Interval History   - Appears seroquel yesterday evening helped again, off 1:1 today  - Medically stable to discharge, to be assessed by home nurse tomorrow  - Called ex spouse Clifford and daughter Maria C, not answered    Assessment & Plan   Summary: Estevan Aaron is a 65 year old male with PMH  hypertension, hyperlipidemia, hyperparathyroidism and dementia who was admitted on 4/4/2025 with aggressive behaviors at his care facility.     Acute toxic metabolic encephalopathy, multifactorial  Hospital-acquired delirium  Chronic neuropsychiatric disorder with behavioral disturbance, unclear etiology  Alzheimer's dementia  Patient carries diagnosis of Alzheimer's dementia, although has significant history of underlying psychiatric disease and concern for paranoid personality disorder. Patient's ex-wife/guardian describes a rather accelerated mental status decline beginning in late 2024. Had a prolonged inpatient psychiatric hospitalization 10/2024-2/2025.  At that time, suspected multifactorial encephalopathy dt delirium, psychoactive medication effects, and undifferentiated neuropsychiatric disorder.  Prior to admission, patient was ambulating independently at [facility]. Reportedly exhibited aggressive behaviors at [facility], prompting referral to ED for evaluation. In the ED, patient was calm and cooperative with staff, although has since had significant behavioral disturbances after admission. Has been seen by psych this stay, assisted with medication titration, however, patient developed severe encephalopathy with persistence somnolence.  Had a period of transient improvement in his mentation which was followed by subsequent decline with worsening somnolence noted on 4/24.  Etiology of persistent encephalopathy and worsening functional status unclear (began requiring Winnie lift, assist of 2; not following commands; had nonsensical  speech). Considered occult infection (see fever below), consider rare neurological disorder (LP done 4/29), consider prolonged delirium, and consider progressive dementia.  Additional neurologic workup pursued.  MRI of brain this day was negative for acute changes. Mildly elevated ESR/CRP this stay. Thyroid studies generally unremarkable. EEG obtained 4/26 was consistent with encephalopathy, no seizures. LP done 4/29, was negative for CNS infection. MRI cervical spine negative on 4/29. Paraneoplastic panel neg. Alzheimer's evaluation ([id number]) done on CSF and was elevated -- consistent with Alzheimer's.  Psych attempted an Ativan challenge for treatment of possible catatonia, this resulted in increased somnolence making catatonia less likely culprit.  Clinical picture ultimately appeared suggestive of underlying Alzheimer's dementia and superimposed delirium; unsure how much medication effect of Depakote and electrolyte disturbances may have also be contributing  Discussed with neuro on 5/4 -- held Depakote beginning 5/4 and monitor response. Mentation/overall condition continues to improve off Depakote -- cont holding   Note RRT 5/7 and 5/8 for agitation, restlessness.  - Reconsult Psychiatry 5/8 for management of agitation  - Cont delirium precautions, maintain nl sleep/wake cycles  - Anticipate discharge back to memory care facility in the coming days with continued restorative cares, no plans for hospice care at this time  - Will start Seroquel 12.5mg in evenings and assess response    Concern for aspiration PNA vs pneumonitis: Treated  Intermittent fever w/ leukocytosis, unclear etiology: Improved  Acute hypoxic hypercarbic respiratory failure: Resolved  Sepsis w/ elevated lactate: Resolved  Respiratory acidosis w/ metabolic compensation: Resolved  RRT called 4/20 AM for somnolence, congested cough, hypoxia to 84%, and borderline fever. Briefly required BiPAP and quickly weaned to O2 NC. CXR without  opacity, although some concern for acute aspiration event in setting of suspected medication-mediated somnolence as above.  Was able to quickly wean off O2. Intermittent fevers and leukocytosis of unclear etiology.  UA noninfectious, no ongoing respiratory symptoms, COVID/flu negative, CXR stable, pressure injury noted without concern for infection, no other skin changes, and MRI without acute change. LFTs mildly elevated but no apparent pain with deep abdominal palpation. No new medications. Overall unclear exact etiology of fevers and challenging assessment given underlying dementia. Completed 10 day course of empiric Zosyn 4/29 with resolution of fevers, persistent leukocytosis, no further treatment recommended by ID.  -- WBC normalized, monitor periodically    Abnormal transaminases: Stable  AST/ALT intermittently elevated this stay. Total bili, alk phos nl. Holding statin. Monitor labs periodically.    Hyperparathyroidism with hypercalcemia  Hypernatremia: Improved  Ongoing hypercalcemia noted this stay despite cinacalcet administration (held 4/23-4/26). Low suspicion that mild hypercalcemia alone is driving mental status changes. Repeat PTH slightly elevated to 73 this admission. Ionized calcium is elevated.  Nephrology consulted 4/30 for high calcium despite cinacalcet. Cinacalcet increased, given Zometa x1 on 5/1.   Given IVF bolus on 5/1-5/2 dt poor po intake. Lasix held. Given 500ml D5W on 5/3. Na and Ca slowly improved, unclear how much to correlate with slight improvement in mentation  - Cont cinacalcet 90mg daily  - Monitor BMP periodically    Hypertension  Hyperlipidemia  PTA meds: amlodipine, clonidine, lisinopril, metoprolol, statin.   - BPs soft on 5/6 -- will hold amlodipine, other meds continued with hold parameters  - statin on hold dt abnl LFTs    Suspected ADAM   No formal sleep study, although high suspicion. Outpatient sleep center referral on discharge    Deep tissue pressure injury,  sacrococcygeal area, hospital-acquired   No signs of infection. WOC RN following    Clinically Significant Risk Factors               # Hypoalbuminemia: Lowest albumin = 2.9 g/dL at 4/28/2025  4:51 AM, will monitor as appropriate     # Hypertension: Noted on problem list     # Acute Hypercapnic Respiratory Failure: based on venous blood gas results.  Continue supplemental oxygen and ventilatory support as indicated.    # Dementia: noted on problem list            # Financial/Environmental Concerns: none          PT/OT: ordered  Diet: Snacks/Supplements Adult: Ensure Enlive; With Meals  Room Service  Combination Diet Safe Tray - NO utensils; Soft and Bite Sized Diet (level 6); Thin Liquids (level 0)  Snacks/Supplements Adult: Expedite Bottle; With Meals    DVT Prophylaxis: Pneumatic Compression Devices  Alberto Catheter: Not present  Lines: None     Cardiac Monitoring: None  Code Status: No CPR- Do NOT Intubate    Medically Ready for Discharge: Ready Now, awaiting discharge back to memory care/LTC, pending nurse assessment 5/12    Jonatan Simental MD  Hospitalist Service  [facility]  Securely message with [employer] (more info)  Text page via [employer] Paging/Directory     Data reviewed today: I reviewed all new labs and imaging results over the last 24 hours.    Physical Exam   Temp: 97.8  F (36.6  C) Temp src: Oral BP: 132/71 Pulse: 53   Resp: 18 SpO2: 100 % O2 Device: None (Room air)    There were no vitals filed for this visit.  Vital Signs with Ranges  Temp:  [97.7  F (36.5  C)-98  F (36.7  C)] 97.8  F (36.6  C)  Pulse:  [53-68] 53  Resp:  [18-20] 18  BP: (114-132)/(65-93) 132/71  SpO2:  [95 %-100 %] 100 %  I/O last 3 completed shifts:  In: 280 [P.O.:280]  Out: 900 [Urine:900]  O2 requirements: none    Constitutional: Male appears tired  HEENT: Eyes nonicteric  Cardiovascular: RRR, normal S1/2, no m/r/g  Respiratory: CTAB, no wheezing or crackles  Vascular: No LE pitting edema  GI:  Normoactive bowel sounds, nontender  Neuro/Psych: A&O to self, moves all extremities    Medications   Current Facility-Administered Medications   Medication Dose Route Frequency Provider Last Rate Last Admin     Current Facility-Administered Medications   Medication Dose Route Frequency Provider Last Rate Last Admin    [Held by provider] amLODIPine (NORVASC) tablet 10 mg  10 mg Oral Daily Troy Farley MD   10 mg at 05/04/25 0858    cinacalcet (SENSIPAR) tablet 90 mg  90 mg Oral Daily NIKOLAY Gambino MD   90 mg at 05/10/25 0945    cloNIDine (CATAPRES) tablet 0.1 mg  0.1 mg Oral BID Monse Lawson DO   0.1 mg at 05/10/25 0945    FLUoxetine (PROzac) capsule 20 mg  20 mg Oral Daily Eduardo Reza PA-C   20 mg at 05/10/25 0945    gabapentin (NEURONTIN) capsule 300 mg  300 mg Oral At Bedtime Troy Short CNP   300 mg at 05/09/25 2124    lisinopril (ZESTRIL) tablet 40 mg  40 mg Oral Daily Troy Farley MD   40 mg at 05/10/25 0945    melatonin tablet 3 mg  3 mg Oral At Bedtime Oleksandr Kong MD   3 mg at 05/09/25 2124    metoprolol succinate ER (TOPROL XL) 24 hr tablet 50 mg  50 mg Oral Daily Monse Lawson DO   50 mg at 05/08/25 0939    multivitamin w/minerals (THERA-VIT-M) tablet 1 tablet  1 tablet Oral Daily Monse Lawson DO   1 tablet at 05/10/25 0944    sennosides (SENOKOT) tablet 1 tablet  1 tablet Oral Daily Armando Kern MD   1 tablet at 05/10/25 0944    sodium chloride (PF) 0.9% PF flush 3 mL  3 mL Intracatheter Q8H Jonatan Simental MD   3 mL at 05/10/25 0945       Data   Recent Labs   Lab 05/09/25  0830 05/08/25  0831 05/08/25  0047 05/07/25  0826 05/06/25  0924 05/05/25  0842   WBC  --   --  11.4*  --  10.7 13.1*   HGB  --   --  11.0*  --  11.5* 12.0*   MCV  --   --  61*  --  62* 63*   PLT  --   --  386  --  421 454*   NA  --   --  139  --  141 142   POTASSIUM 4.6 3.9 4.0   < > 3.6 3.7   CHLORIDE  --   --  104  --  105 107   CO2  --   --  23  --  22 22    BUN  --   --  26.4*  --  38.2* 39.1*   CR  --   --  0.88  --  0.99 1.03   ANIONGAP  --   --  12  --  14 13   UDAY  --   --  9.5  --  9.4 10.2   GLC  --   --  100*  --  122* 136*   ALBUMIN  --   --   --   --   --  3.8   PROTTOTAL  --   --   --   --   --  7.1   BILITOTAL  --   --   --   --   --  0.9   ALKPHOS  --   --   --   --   --  118   ALT  --   --   --   --   --  66   AST  --   --   --   --   --  53*    < > = values in this interval not displayed.       Imaging:   No results found for this or any previous visit (from the past 24 hours).

## 2025-05-10 NOTE — PLAN OF CARE
Goal Outcome Evaluation:       Pt Alert to self, baseline dementia. VSS on RA. Soft/Bite sized diet, good apetite. Up 1A GB&W, voiding in BR/incontinent at times. Denies pain. WOC cares done. VINAY NAVAS. Psych following. Discharge to LTC pending medical stability, Continue plan of Care.
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
## 2025-05-11 LAB
ACANTHOCYTES BLD QL SMEAR: SLIGHT
ANION GAP SERPL CALCULATED.3IONS-SCNC: 13 MMOL/L (ref 7–15)
BUN SERPL-MCNC: 14.4 MG/DL (ref 8–23)
CALCIUM SERPL-MCNC: 9 MG/DL (ref 8.8–10.4)
CHLORIDE SERPL-SCNC: 101 MMOL/L (ref 98–107)
CREAT SERPL-MCNC: 0.76 MG/DL (ref 0.67–1.17)
EGFRCR SERPLBLD CKD-EPI 2021: >90 ML/MIN/1.73M2
ELLIPTOCYTES BLD QL SMEAR: SLIGHT
ERYTHROCYTE [DISTWIDTH] IN BLOOD BY AUTOMATED COUNT: 18.1 % (ref 10–15)
GLUCOSE SERPL-MCNC: 91 MG/DL (ref 70–99)
HCO3 SERPL-SCNC: 22 MMOL/L (ref 22–29)
HCT VFR BLD AUTO: 35 % (ref 40–53)
HGB BLD-MCNC: 10.8 G/DL (ref 13.3–17.7)
MCH RBC QN AUTO: 18.8 PG (ref 26.5–33)
MCHC RBC AUTO-ENTMCNC: 30.9 G/DL (ref 31.5–36.5)
MCV RBC AUTO: 61 FL (ref 78–100)
PLAT MORPH BLD: ABNORMAL
PLATELET # BLD AUTO: 322 10E3/UL (ref 150–450)
POTASSIUM SERPL-SCNC: 4.1 MMOL/L (ref 3.4–5.3)
RBC # BLD AUTO: 5.73 10E6/UL (ref 4.4–5.9)
RBC MORPH BLD: ABNORMAL
SODIUM SERPL-SCNC: 136 MMOL/L (ref 135–145)
TARGETS BLD QL SMEAR: SLIGHT
WBC # BLD AUTO: 7.8 10E3/UL (ref 4–11)

## 2025-05-11 PROCEDURE — 36415 COLL VENOUS BLD VENIPUNCTURE: CPT | Performed by: INTERNAL MEDICINE

## 2025-05-11 PROCEDURE — 250N000013 HC RX MED GY IP 250 OP 250 PS 637: Performed by: INTERNAL MEDICINE

## 2025-05-11 PROCEDURE — 99231 SBSQ HOSP IP/OBS SF/LOW 25: CPT | Performed by: INTERNAL MEDICINE

## 2025-05-11 PROCEDURE — 250N000013 HC RX MED GY IP 250 OP 250 PS 637: Performed by: PHYSICIAN ASSISTANT

## 2025-05-11 PROCEDURE — 250N000013 HC RX MED GY IP 250 OP 250 PS 637: Performed by: STUDENT IN AN ORGANIZED HEALTH CARE EDUCATION/TRAINING PROGRAM

## 2025-05-11 PROCEDURE — 85027 COMPLETE CBC AUTOMATED: CPT | Performed by: INTERNAL MEDICINE

## 2025-05-11 PROCEDURE — 82947 ASSAY GLUCOSE BLOOD QUANT: CPT | Performed by: INTERNAL MEDICINE

## 2025-05-11 PROCEDURE — 120N000001 HC R&B MED SURG/OB

## 2025-05-11 RX ADMIN — CLONIDINE HYDROCHLORIDE 0.1 MG: 0.1 TABLET ORAL at 20:16

## 2025-05-11 RX ADMIN — LISINOPRIL 40 MG: 40 TABLET ORAL at 08:08

## 2025-05-11 RX ADMIN — CLONIDINE HYDROCHLORIDE 0.1 MG: 0.1 TABLET ORAL at 08:08

## 2025-05-11 RX ADMIN — QUETIAPINE 12.5 MG: 25 TABLET, FILM COATED ORAL at 20:16

## 2025-05-11 RX ADMIN — CINACALCET 90 MG: 30 TABLET ORAL at 08:08

## 2025-05-11 RX ADMIN — SENNOSIDES 1 TABLET: 8.6 TABLET ORAL at 08:08

## 2025-05-11 RX ADMIN — Medication 1 TABLET: at 08:08

## 2025-05-11 RX ADMIN — FLUOXETINE HYDROCHLORIDE 20 MG: 20 CAPSULE ORAL at 08:08

## 2025-05-11 ASSESSMENT — ACTIVITIES OF DAILY LIVING (ADL)
ADLS_ACUITY_SCORE: 68
ADLS_ACUITY_SCORE: 63
ADLS_ACUITY_SCORE: 63
ADLS_ACUITY_SCORE: 68
ADLS_ACUITY_SCORE: 63
ADLS_ACUITY_SCORE: 68
ADLS_ACUITY_SCORE: 63
ADLS_ACUITY_SCORE: 68
ADLS_ACUITY_SCORE: 68
ADLS_ACUITY_SCORE: 63

## 2025-05-11 NOTE — PLAN OF CARE
Goal Outcome Evaluation:       Date/Time:05/10/25    Trauma/Ortho/Medical (Choose one) Medical    Diagnosis:Dementia with Aggressive behavior   POD#:N/A  Mental Status:Alert to self  Activity/dangle:SBA  Diet:Soft bite size ,thin liquid  Pain:Denied  Alberto/Voiding:Incontinent at times  Tele/Restraints/Iso:N/A  02/LDA:L PIV/SL  D/C Date:Pending LTC when medically stable  Other Info:Psych following.

## 2025-05-11 NOTE — PROGRESS NOTES
[facility]    Hospitalist Progress Note    Interval History   - Per nurse good response to seroquel, remains off 1:1  - Appears in a good mood today, conversation limited by his confused responses unchanged from previous    Assessment & Plan   Summary: Estevan Aaron is a 65 year old male with PMH  hypertension, hyperlipidemia, hyperparathyroidism and dementia who was admitted on 4/4/2025 with aggressive behaviors at his care facility.     Acute toxic metabolic encephalopathy, multifactorial  Hospital-acquired delirium  Chronic neuropsychiatric disorder with behavioral disturbance, unclear etiology  Alzheimer's dementia  Patient carries diagnosis of Alzheimer's dementia, although has significant history of underlying psychiatric disease and concern for paranoid personality disorder. Patient's ex-wife/guardian describes a rather accelerated mental status decline beginning in late 2024. Had a prolonged inpatient psychiatric hospitalization 10/2024-2/2025.  At that time, suspected multifactorial encephalopathy dt delirium, psychoactive medication effects, and undifferentiated neuropsychiatric disorder.  Prior to admission, patient was ambulating independently at [facility]. Reportedly exhibited aggressive behaviors at [facility], prompting referral to ED for evaluation. In the ED, patient was calm and cooperative with staff, although has since had significant behavioral disturbances after admission. Has been seen by psych this stay, assisted with medication titration, however, patient developed severe encephalopathy with persistence somnolence.  Had a period of transient improvement in his mentation which was followed by subsequent decline with worsening somnolence noted on 4/24.  Etiology of persistent encephalopathy and worsening functional status unclear (began requiring Winnie lift, assist of 2; not following commands; had nonsensical speech). Considered occult infection (see  fever below), consider rare neurological disorder (LP done 4/29), consider prolonged delirium, and consider progressive dementia.  Additional neurologic workup pursued.  MRI of brain this day was negative for acute changes. Mildly elevated ESR/CRP this stay. Thyroid studies generally unremarkable. EEG obtained 4/26 was consistent with encephalopathy, no seizures. LP done 4/29, was negative for CNS infection. MRI cervical spine negative on 4/29. Paraneoplastic panel neg. Alzheimer's evaluation ([id number]) done on CSF and was elevated -- consistent with Alzheimer's.  Psych attempted an Ativan challenge for treatment of possible catatonia, this resulted in increased somnolence making catatonia less likely culprit.  Clinical picture ultimately appeared suggestive of underlying Alzheimer's dementia and superimposed delirium; unsure how much medication effect of Depakote and electrolyte disturbances may have also be contributing  Discussed with neuro on 5/4 -- held Depakote beginning 5/4 and monitor response. Mentation/overall condition continues to improve off Depakote -- cont holding   Note RRT 5/7 and 5/8 for agitation, restlessness.  - Reconsult Psychiatry 5/8 for management of agitation  - Cont delirium precautions, maintain nl sleep/wake cycles  - Anticipate discharge back to memory care facility in the coming days with continued restorative cares, no plans for hospice care at this time  - Will start Seroquel 12.5mg in evenings and assess response    Concern for aspiration PNA vs pneumonitis: Treated  Intermittent fever w/ leukocytosis, unclear etiology: Improved  Acute hypoxic hypercarbic respiratory failure: Resolved  Sepsis w/ elevated lactate: Resolved  Respiratory acidosis w/ metabolic compensation: Resolved  RRT called 4/20 AM for somnolence, congested cough, hypoxia to 84%, and borderline fever. Briefly required BiPAP and quickly weaned to O2 NC. CXR without opacity, although some concern for acute  aspiration event in setting of suspected medication-mediated somnolence as above.  Was able to quickly wean off O2. Intermittent fevers and leukocytosis of unclear etiology.  UA noninfectious, no ongoing respiratory symptoms, COVID/flu negative, CXR stable, pressure injury noted without concern for infection, no other skin changes, and MRI without acute change. LFTs mildly elevated but no apparent pain with deep abdominal palpation. No new medications. Overall unclear exact etiology of fevers and challenging assessment given underlying dementia. Completed 10 day course of empiric Zosyn 4/29 with resolution of fevers, persistent leukocytosis, no further treatment recommended by ID.  -- WBC normalized, monitor periodically    Abnormal transaminases: Stable  AST/ALT intermittently elevated this stay. Total bili, alk phos nl. Holding statin. Monitor labs periodically.    Hyperparathyroidism with hypercalcemia  Hypernatremia: Improved  Ongoing hypercalcemia noted this stay despite cinacalcet administration (held 4/23-4/26). Low suspicion that mild hypercalcemia alone is driving mental status changes. Repeat PTH slightly elevated to 73 this admission. Ionized calcium is elevated.  Nephrology consulted 4/30 for high calcium despite cinacalcet. Cinacalcet increased, given Zometa x1 on 5/1.   Given IVF bolus on 5/1-5/2 dt poor po intake. Lasix held. Given 500ml D5W on 5/3. Na and Ca slowly improved, unclear how much to correlate with slight improvement in mentation  - Cont cinacalcet 90mg daily  - Monitor BMP periodically    Hypertension  Hyperlipidemia  PTA meds: amlodipine, clonidine, lisinopril, metoprolol, statin.   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 Outpatient sleep center referral on discharge    Deep tissue pressure injury, sacrococcygeal area, hospital-acquired   No signs  of infection. WOC RN following    Clinically Significant Risk Factors               # Hypoalbuminemia: Lowest albumin = 2.9 g/dL at 4/28/2025  4:51 AM, will monitor as appropriate     # Hypertension: Noted on problem list     # Acute Hypercapnic Respiratory Failure: based on venous blood gas results.  Continue supplemental oxygen and ventilatory support as indicated.    # Dementia: noted on problem list            # Financial/Environmental Concerns: none          PT/OT: ordered  Diet: Snacks/Supplements Adult: Ensure Enlive; With Meals  Room Service  Combination Diet Safe Tray - NO utensils; Soft and Bite Sized Diet (level 6); Thin Liquids (level 0)  Snacks/Supplements Adult: Expedite Bottle; With Meals    DVT Prophylaxis: Pneumatic Compression Devices  Alberto Catheter: Not present  Lines: None     Cardiac Monitoring: None  Code Status: No CPR- Do NOT Intubate    Medically Ready for Discharge: Ready Now, awaiting discharge back to memory care/LTC, pending nurse assessment 5/12    Jonatan Simental MD  Hospitalist Service  [facility]  Securely message with tab ticketbroker (more info)  Text page via [employer] Paging/Directory     Data reviewed today: I reviewed all new labs and imaging results over the last 24 hours.    Physical Exam   Temp: 98.9  F (37.2  C) Temp src: Oral BP: 133/81 Pulse: 58   Resp: 16 SpO2: 96 % O2 Device: None (Room air)    There were no vitals filed for this visit.  Vital Signs with Ranges  Temp:  [97.2  F (36.2  C)-98.9  F (37.2  C)] 98.9  F (37.2  C)  Pulse:  [58-84] 58  Resp:  [16-18] 16  BP: (133-148)/(74-81) 133/81  SpO2:  [96 %-98 %] 96 %  I/O last 3 completed shifts:  In: 1205 [P.O.:1205]  Out: -   O2 requirements: none    Constitutional: Male appears tired  HEENT: Eyes nonicteric  Cardiovascular: RRR, normal S1/2, no m/r/g  Respiratory: CTAB, no wheezing or crackles  Vascular: No LE pitting edema  GI: Normoactive bowel sounds, nontender  Neuro/Psych: A&O to self, moves all  extremities    Medications   Current Facility-Administered Medications   Medication Dose Route Frequency Provider Last Rate Last Admin     Current Facility-Administered Medications   Medication Dose Route Frequency Provider Last Rate Last Admin    [Held by provider] amLODIPine (NORVASC) tablet 10 mg  10 mg Oral Daily Troy Farley MD   10 mg at 05/04/25 0858    cinacalcet (SENSIPAR) tablet 90 mg  90 mg Oral Daily NIKOLAY Gambino MD   90 mg at 05/11/25 0808    cloNIDine (CATAPRES) tablet 0.1 mg  0.1 mg Oral BID Monse Lawson DO   0.1 mg at 05/11/25 0808    FLUoxetine (PROzac) capsule 20 mg  20 mg Oral Daily Eduardo Reza PA-C   20 mg at 05/11/25 0808    gabapentin (NEURONTIN) capsule 300 mg  300 mg Oral At Bedtime Troy Short CNP   300 mg at 05/10/25 2120    lisinopril (ZESTRIL) tablet 40 mg  40 mg Oral Daily Troy Farley MD   40 mg at 05/11/25 0808    melatonin tablet 3 mg  3 mg Oral At Bedtime Oleksandr Kong MD   3 mg at 05/10/25 2120    metoprolol succinate ER (TOPROL XL) 24 hr tablet 50 mg  50 mg Oral Daily Monse Lawson DO   50 mg at 05/08/25 0939    multivitamin w/minerals (THERA-VIT-M) tablet 1 tablet  1 tablet Oral Daily Monse Lawson DO   1 tablet at 05/11/25 0808    QUEtiapine (SEROquel) half-tab 12.5 mg  12.5 mg Oral QPM Jonatan Simental MD   12.5 mg at 05/10/25 2120    sennosides (SENOKOT) tablet 1 tablet  1 tablet Oral Daily Armando Kern MD   1 tablet at 05/11/25 0808    sodium chloride (PF) 0.9% PF flush 3 mL  3 mL Intracatheter Q8H Jonatan Simental MD   3 mL at 05/11/25 0809       Data   Recent Labs   Lab 05/09/25  0830 05/08/25  0831 05/08/25  0047 05/07/25  0826 05/06/25 0924 05/05/25  0842   WBC  --   --  11.4*  --  10.7 13.1*   HGB  --   --  11.0*  --  11.5* 12.0*   MCV  --   --  61*  --  62* 63*   PLT  --   --  386  --  421 454*   NA  --   --  139  --  141 142   POTASSIUM 4.6 3.9 4.0   < > 3.6 3.7   CHLORIDE  --   --  104  --   105 107   CO2  --   --  23  --  22 22   BUN  --   --  26.4*  --  38.2* 39.1*   CR  --   --  0.88  --  0.99 1.03   ANIONGAP  --   --  12  --  14 13   UDAY  --   --  9.5  --  9.4 10.2   GLC  --   --  100*  --  122* 136*   ALBUMIN  --   --   --   --   --  3.8   PROTTOTAL  --   --   --   --   --  7.1   BILITOTAL  --   --   --   --   --  0.9   ALKPHOS  --   --   --   --   --  118   ALT  --   --   --   --   --  66   AST  --   --   --   --   --  53*    < > = values in this interval not displayed.       Imaging:   No results found for this or any previous visit (from the past 24 hours).

## 2025-05-11 NOTE — PLAN OF CARE
Diagnosis: Dementia with Aggressive behavior   POD#:NA  Mental Status:  NARESH  Activity/dangle up SBA to bathroom  Diet: Soft bite size, thin liquid   Pain: Denied   Alberto/Voiding: incontinent  Tele/Restraints/Iso:NA   02/LDA: On RA, PIV SL  D/C Date: TBD  Other Info: Sitter at bedside. On Potassium protocol

## 2025-05-11 NOTE — PLAN OF CARE
Orientation- A/O to self, calm/cooperative, impulsive    Vitals/Tele- VSS on RA    IV Access/drains- PIV SL    Diet- soft and bite sized. Meds crushed    Mobility- SBA    GI/- continent    Wound/Skin- Scab to lower extremity, abrasion/excoriation and redness back and R shin. Mepilex on Sacrum/Coccyx    Consults-WOC/psych s/o    Discharge Plan- pending evaluation from nurse at [facility]      See Flow sheets for assessment

## 2025-05-12 PROCEDURE — 250N000013 HC RX MED GY IP 250 OP 250 PS 637: Performed by: INTERNAL MEDICINE

## 2025-05-12 PROCEDURE — 99231 SBSQ HOSP IP/OBS SF/LOW 25: CPT | Performed by: INTERNAL MEDICINE

## 2025-05-12 PROCEDURE — 250N000013 HC RX MED GY IP 250 OP 250 PS 637: Performed by: STUDENT IN AN ORGANIZED HEALTH CARE EDUCATION/TRAINING PROGRAM

## 2025-05-12 PROCEDURE — 120N000001 HC R&B MED SURG/OB

## 2025-05-12 PROCEDURE — 250N000013 HC RX MED GY IP 250 OP 250 PS 637: Performed by: PHYSICIAN ASSISTANT

## 2025-05-12 PROCEDURE — 250N000013 HC RX MED GY IP 250 OP 250 PS 637: Performed by: NURSE PRACTITIONER

## 2025-05-12 PROCEDURE — 250N000013 HC RX MED GY IP 250 OP 250 PS 637: Performed by: HOSPITALIST

## 2025-05-12 RX ORDER — QUETIAPINE FUMARATE 25 MG/1
12.5 TABLET, FILM COATED ORAL EVERY EVENING
Qty: 30 TABLET | Refills: 0 | Status: SHIPPED | OUTPATIENT
Start: 2025-05-12

## 2025-05-12 RX ORDER — MULTIPLE VITAMINS W/ MINERALS TAB 9MG-400MCG
1 TAB ORAL DAILY
Qty: 30 TABLET | Refills: 0 | Status: SHIPPED | OUTPATIENT
Start: 2025-05-13

## 2025-05-12 RX ORDER — QUETIAPINE FUMARATE 25 MG/1
25 TABLET, FILM COATED ORAL 3 TIMES DAILY PRN
Qty: 30 TABLET | Refills: 0 | Status: SHIPPED | OUTPATIENT
Start: 2025-05-12

## 2025-05-12 RX ORDER — GABAPENTIN 300 MG/1
300 CAPSULE ORAL EVERY EVENING
Qty: 30 CAPSULE | Refills: 0 | Status: SHIPPED | OUTPATIENT
Start: 2025-05-12

## 2025-05-12 RX ADMIN — LISINOPRIL 40 MG: 40 TABLET ORAL at 08:08

## 2025-05-12 RX ADMIN — QUETIAPINE 12.5 MG: 25 TABLET, FILM COATED ORAL at 19:59

## 2025-05-12 RX ADMIN — MELATONIN TAB 3 MG 3 MG: 3 TAB at 21:08

## 2025-05-12 RX ADMIN — GABAPENTIN 300 MG: 300 CAPSULE ORAL at 21:08

## 2025-05-12 RX ADMIN — METOPROLOL SUCCINATE 50 MG: 50 TABLET, EXTENDED RELEASE ORAL at 08:10

## 2025-05-12 RX ADMIN — SENNOSIDES 1 TABLET: 8.6 TABLET ORAL at 08:08

## 2025-05-12 RX ADMIN — CLONIDINE HYDROCHLORIDE 0.1 MG: 0.1 TABLET ORAL at 19:59

## 2025-05-12 RX ADMIN — CINACALCET 90 MG: 30 TABLET ORAL at 08:09

## 2025-05-12 RX ADMIN — Medication 1 TABLET: at 08:10

## 2025-05-12 RX ADMIN — FLUOXETINE HYDROCHLORIDE 20 MG: 20 CAPSULE ORAL at 08:10

## 2025-05-12 RX ADMIN — CLONIDINE HYDROCHLORIDE 0.1 MG: 0.1 TABLET ORAL at 08:10

## 2025-05-12 ASSESSMENT — ACTIVITIES OF DAILY LIVING (ADL)
ADLS_ACUITY_SCORE: 62
ADLS_ACUITY_SCORE: 62
ADLS_ACUITY_SCORE: 63
ADLS_ACUITY_SCORE: 62
ADLS_ACUITY_SCORE: 63
ADLS_ACUITY_SCORE: 62
ADLS_ACUITY_SCORE: 63
ADLS_ACUITY_SCORE: 63
ADLS_ACUITY_SCORE: 62
ADLS_ACUITY_SCORE: 63
ADLS_ACUITY_SCORE: 63
ADLS_ACUITY_SCORE: 62
ADLS_ACUITY_SCORE: 63

## 2025-05-12 NOTE — PROGRESS NOTES
[facility]    Hospitalist Progress Note    Interval History   - Doing well, confused as usual, calm, appears to be doing well with small dose of seroquel in evening, not needing seroquel PRNs  - Reviewed PT notes, patient walked 100ft without physical assistance  - Patient remains medically stable to discharge, per   - Updated guardian/exwife Clifford  Addendum: Possible discharge tomorrow back to [facility], placed most discharge orders in    Assessment & Plan   Summary: Estevan Aaron is a 65 year old male with PMH  hypertension, hyperlipidemia, hyperparathyroidism and dementia who was admitted on 4/4/2025 with aggressive behaviors at his care facility.     Acute toxic metabolic encephalopathy, multifactorial  Hospital-acquired delirium  Chronic neuropsychiatric disorder with behavioral disturbance, unclear etiology  Alzheimer's dementia  Patient carries diagnosis of Alzheimer's dementia, although has significant history of underlying psychiatric disease and concern for paranoid personality disorder. Patient's ex-wife/guardian describes a rather accelerated mental status decline beginning in late 2024. Had a prolonged inpatient psychiatric hospitalization 10/2024-2/2025.  At that time, suspected multifactorial encephalopathy dt delirium, psychoactive medication effects, and undifferentiated neuropsychiatric disorder.  Prior to admission, patient was ambulating independently at [facility]. Reportedly exhibited aggressive behaviors at [facility], prompting referral to ED for evaluation. In the ED, patient was calm and cooperative with staff, although has since had significant behavioral disturbances after admission. Has been seen by psych this stay, assisted with medication titration, however, patient developed severe encephalopathy with persistence somnolence.  Had a period of transient improvement in his mentation which was followed by subsequent decline with worsening  somnolence noted on 4/24.  Etiology of persistent encephalopathy and worsening functional status unclear (began requiring Winnie lift, assist of 2; not following commands; had nonsensical speech). Considered occult infection (see fever below), consider rare neurological disorder (LP done 4/29), consider prolonged delirium, and consider progressive dementia.  Additional neurologic workup pursued.  MRI of brain this day was negative for acute changes. Mildly elevated ESR/CRP this stay. Thyroid studies generally unremarkable. EEG obtained 4/26 was consistent with encephalopathy, no seizures. LP done 4/29, was negative for CNS infection. MRI cervical spine negative on 4/29. Paraneoplastic panel neg. Alzheimer's evaluation ([id number]) done on CSF and was elevated -- consistent with Alzheimer's.  Psych attempted an Ativan challenge for treatment of possible catatonia, this resulted in increased somnolence making catatonia less likely culprit.  Clinical picture ultimately appeared suggestive of underlying Alzheimer's dementia and superimposed delirium; unsure how much medication effect of Depakote and electrolyte disturbances may have also be contributing  Discussed with neuro on 5/4 -- held Depakote beginning 5/4 and monitor response. Mentation/overall condition continues to improve off Depakote -- cont holding   Note RRT 5/7 and 5/8 for agitation, restlessness.  - Reconsult Psychiatry 5/8 for management of agitation  - Cont delirium precautions, maintain nl sleep/wake cycles  - Anticipate discharge back to memory care facility in the coming days with continued restorative cares, no plans for hospice care at this time  - Strarted Seroquel 12.5mg in evenings and note good response    Concern for aspiration PNA vs pneumonitis: Treated  Intermittent fever w/ leukocytosis, unclear etiology: Improved  Acute hypoxic hypercarbic respiratory failure: Resolved  Sepsis w/ elevated lactate: Resolved  Respiratory acidosis w/  metabolic compensation: Resolved  RRT called 4/20 AM for somnolence, congested cough, hypoxia to 84%, and borderline fever. Briefly required BiPAP and quickly weaned to O2 NC. CXR without opacity, although some concern for acute aspiration event in setting of suspected medication-mediated somnolence as above.  Was able to quickly wean off O2. Intermittent fevers and leukocytosis of unclear etiology.  UA noninfectious, no ongoing respiratory symptoms, COVID/flu negative, CXR stable, pressure injury noted without concern for infection, no other skin changes, and MRI without acute change. LFTs mildly elevated but no apparent pain with deep abdominal palpation. No new medications. Overall unclear exact etiology of fevers and challenging assessment given underlying dementia. Completed 10 day course of empiric Zosyn 4/29 with resolution of fevers, persistent leukocytosis, no further treatment recommended by ID.  - WBC normalized, monitor periodically    Abnormal transaminases: Stable  AST/ALT intermittently elevated this stay. Total bili, alk phos nl. Holding statin. Monitor labs periodically.    Hyperparathyroidism with hypercalcemia  Hypernatremia: Improved  Ongoing hypercalcemia noted this stay despite cinacalcet administration (held 4/23-4/26). Low suspicion that mild hypercalcemia alone is driving mental status changes. Repeat PTH slightly elevated to 73 this admission. Ionized calcium is elevated.  Nephrology consulted 4/30 for high calcium despite cinacalcet. Cinacalcet increased, given Zometa x1 on 5/1.   Given IVF bolus on 5/1-5/2 dt poor po intake. Lasix held. Given 500ml D5W on 5/3. Na and Ca slowly improved, unclear how much to correlate with slight improvement in mentation  - Cont cinacalcet 90mg daily  - Monitor BMP periodically    Hypertension  Hyperlipidemia  PTA meds: amlodipine, clonidine, lisinopril, metoprolol, statin.   - BPs soft on 5/6 -- will hold amlodipine, other meds continued with hold  parameters  - statin on hold dt abnl LFTs    Suspected ADAM   No formal sleep study, although high suspicion. Outpatient sleep center referral on discharge    Deep tissue pressure injury, sacrococcygeal area, hospital-acquired   No signs of infection. WOC RN following    Clinically Significant Risk Factors               # Hypoalbuminemia: Lowest albumin = 2.9 g/dL at 4/28/2025  4:51 AM, will monitor as appropriate     # Hypertension: Noted on problem list     # Acute Hypercapnic Respiratory Failure: based on venous blood gas results.  Continue supplemental oxygen and ventilatory support as indicated.    # Dementia: noted on problem list            # Financial/Environmental Concerns: none          PT/OT: ordered  Diet: Snacks/Supplements Adult: Ensure Enlive; With Meals  Room Service  Combination Diet Safe Tray - NO utensils; Soft and Bite Sized Diet (level 6); Thin Liquids (level 0)  Snacks/Supplements Adult: Expedite Bottle; With Meals    DVT Prophylaxis: Pneumatic Compression Devices  Alberto Catheter: Not present  Lines: None     Cardiac Monitoring: None  Code Status: No CPR- Do NOT Intubate    Medically Ready for Discharge: Ready Now    Jonatan Simental MD  Hospitalist Service  [facility]  Securely message with [employer] (more info)  Text page via [email or web address] Paging/Directory     Data reviewed today: I reviewed all new labs and imaging results over the last 24 hours.    Physical Exam   Temp: 98.1  F (36.7  C) Temp src: Oral BP: 137/77 Pulse: 78   Resp: 18 SpO2: 97 % O2 Device: None (Room air)    There were no vitals filed for this visit.  Vital Signs with Ranges  Temp:  [97.6  F (36.4  C)-99  F (37.2  C)] 98.1  F (36.7  C)  Pulse:  [69-78] 78  Resp:  [16-18] 18  BP: (114-137)/(71-82) 137/77  SpO2:  [95 %-98 %] 97 %  I/O last 3 completed shifts:  In: 420 [P.O.:420]  Out: -   O2 requirements: none    Constitutional: Male in NAD  HEENT: Eyes nonicteric  Cardiovascular: RRR, normal S1/2, no  m/r/g  Respiratory: CTAB, no wheezing or crackles  Vascular: No LE pitting edema  Neuro/Psych: A&O to self, moves all extremities    Medications   Current Facility-Administered Medications   Medication Dose Route Frequency Provider Last Rate Last Admin     Current Facility-Administered Medications   Medication Dose Route Frequency Provider Last Rate Last Admin    [Held by provider] amLODIPine (NORVASC) tablet 10 mg  10 mg Oral Daily Troy Farley MD   10 mg at 05/04/25 0858    cinacalcet (SENSIPAR) tablet 90 mg  90 mg Oral Daily NIKOLAY Gambino MD   90 mg at 05/12/25 0809    cloNIDine (CATAPRES) tablet 0.1 mg  0.1 mg Oral BID Monse Lawson DO   0.1 mg at 05/12/25 0810    FLUoxetine (PROzac) capsule 20 mg  20 mg Oral Daily Eduardo Reza PA-C   20 mg at 05/12/25 0810    gabapentin (NEURONTIN) capsule 300 mg  300 mg Oral At Bedtime Troy Short CNP   300 mg at 05/10/25 2120    lisinopril (ZESTRIL) tablet 40 mg  40 mg Oral Daily Troy Farley MD   40 mg at 05/12/25 0808    melatonin tablet 3 mg  3 mg Oral At Bedtime Oleksandr Kong MD   3 mg at 05/10/25 2120    metoprolol succinate ER (TOPROL XL) 24 hr tablet 50 mg  50 mg Oral Daily Monse Lawson DO   50 mg at 05/12/25 0810    multivitamin w/minerals (THERA-VIT-M) tablet 1 tablet  1 tablet Oral Daily Monse Lawson DO   1 tablet at 05/12/25 0810    QUEtiapine (SEROquel) half-tab 12.5 mg  12.5 mg Oral QPM Jonatan Simental MD   12.5 mg at 05/11/25 2016    sennosides (SENOKOT) tablet 1 tablet  1 tablet Oral Daily Armando Kern MD   1 tablet at 05/12/25 0808    sodium chloride (PF) 0.9% PF flush 3 mL  3 mL Intracatheter Q8H Jonatan Simental MD   3 mL at 05/12/25 0810       Data   Recent Labs   Lab 05/11/25  1640 05/09/25  0830 05/08/25  0831 05/08/25  0047 05/07/25  0826 05/06/25  0924   WBC 7.8  --   --  11.4*  --  10.7   HGB 10.8*  --   --  11.0*  --  11.5*   MCV 61*  --   --  61*  --  62*     --    --  386  --  421     --   --  139  --  141   POTASSIUM 4.1 4.6 3.9 4.0   < > 3.6   CHLORIDE 101  --   --  104  --  105   CO2 22  --   --  23  --  22   BUN 14.4  --   --  26.4*  --  38.2*   CR 0.76  --   --  0.88  --  0.99   ANIONGAP 13  --   --  12  --  14   UDAY 9.0  --   --  9.5  --  9.4   GLC 91  --   --  100*  --  122*    < > = values in this interval not displayed.       Imaging:   No results found for this or any previous visit (from the past 24 hours).

## 2025-05-12 NOTE — PROGRESS NOTES
Care Management Follow Up    Length of Stay (days): 35    Expected Discharge Date: 05/12/2025     Concerns to be Addressed: discharge planning     Patient plan of care discussed at interdisciplinary rounds: Yes    Anticipated Discharge Disposition:                Anticipated Discharge Services:    Anticipated Discharge DME:      Patient/family educated on Medicare website which has current facility and service quality ratings:    Education Provided on the Discharge Plan:    Patient/Family in Agreement with the Plan: yes    Referrals Placed by CM/SW:    Private pay costs discussed: Not applicable    Discussed  Partnership in Safe Discharge Planning  document with patient/family: No     Handoff Completed: No, handoff not indicated or clinically appropriate    Additional Information:  Writer reached out and spoke with Vera,  at patient's [facility] who stated last week that an RN will be coming out today to assess if patient can return there. Vera stated last week that since patient had a sitter they'd need to be off sitter for 24 hours. I told Vera that at nursing rounds writer made aware sitter has been off since Saturday. Vera wanted to know why he was on the sit in the 1st place and made up excuses why no one could come out today. Vera requested behavior notes and MAR so writer faxed nursing notes and MAR to [phone] per her request. Vera seems to come up with a lot of different excuses and reasons why they don't want to take patient back and is requesting why patient can't go to TCU for rehab. PT to reassess today. Continue POC and working with difficult placement.    Next Steps: medical readiness    Addendum:  PT went to see patient and he's ambulating in the halls independently/SBA just for cognitive issues so PT is signing off. Writer called and left message for daughter/guardian Maria C to see if she's been aware of his improved mobility and whether  she's talked with facility staff. CC will continue to follow.    Oleksandr Whitney RN  [facility]  Inpatient Care Management - FLOAT  CM RN Mobile: [phone] daily 7:30-4:00

## 2025-05-12 NOTE — PLAN OF CARE
Goal Outcome Evaluation:       Pt Alert to self, baseline dementia. VSS on RA. Soft/Bite sized diet, good apetite. Up IND in room, voiding in BR. Denies pain. WOC cares done. No IV access, MD aware. Discharge to LTC likely tomorrow 05/13 w/ stretcher ride scheduled for 1200-1245p, Continue plan of Care.

## 2025-05-12 NOTE — PROGRESS NOTES
[id number]  Pt slept quietly overnight with door open for safety. Did not wake pt to do assessment or vitals due to impulsive and aggressive behaviors.

## 2025-05-12 NOTE — PLAN OF CARE
Goal Outcome Evaluation:         Orientation- A/O to self, calm/cooperative, impulsive     Vitals/Tele- VSS on RA     IV Access/drains- PIV SL     Diet- soft and bite sized. Meds crushed     Mobility- SBA     GI/- continent     Wound/Skin- Scab to lower extremity, abrasion/excoriation and redness back and R shin. Mepilex on Sacrum/Coccyx     Consults-WOC/psych s/o     Discharge Plan- pending evaluation from nurse at [facility] care        See Flow sheets for assessment

## 2025-05-12 NOTE — PROGRESS NOTES
"CLINICAL NUTRITION SERVICES - REASSESSMENT/FOLLOW-UP       Registered Dietitian Interventions:  - Continue to assist patient with meal ordering and \"good\" intakes at TID meals.        Reason for Admission: Behavior problem, History of dementia  PMH:   Past Medical History:   Diagnosis Date    Kidney stone     Serum calcium elevated     Tobacco use disorder     tobacco quit line referral done 4/19/06     SUBJECTIVE INFORMATION  Assessed patient in room.    CURRENT NUTRITION ORDERS  Diet: Soft & Bite Sized (L6)  Snacks/Supplements: Ensure with meals + Expedite bottle with meals     CURRENT INTAKE/TOLERANCE  5/12- Flowsheets and RN show a \"good\" appetite with TID meals or % intakes.     5/5 - Flowsheets show a fair appetite and quite variable intakes ranging from 0-4 intakes per day of 0-75%, with a couple 100% intakes     4/28 - Fluctuating intakes are documented, mostly 25-50% intakes in the setting of AMS. Messaged RN about supplement preference - patient has been like the chocolate Ensures. Adjusted supplement order.      4/21 - Discussed with patient's RN. Notes indicate 0-25% of meals consumed, pt sometimes spitting bites back out rather than chewing and swallowing. Diet modified to hopefully make it easier to chew and swallow per SLP.      ESTIMATED NUTRITION NEEDS  Based on: 64.5 kg (Ideal Body Weight)    Energy: 1712-3284 kcals/day (25 - 30 kcals/kg of ideal wt)  Justification: Maintenance  Protein:  77-97 grams protein/day (1.2 - 1.5 grams of pro/kg of ideal wt)  Justification: Maintenance  Fluid:    1 mL/kcal (1 mL/kcal)  Justification: Maintenance    NEW FINDINGS  Height: 1.676 m (5' 6\")  Admission Weight:     Most Recent Weight:    IBW: 64.5 kg   There is no height or weight on file to calculate BMI. Unable to assess d/t lack of anthropometrics.   Weight History: No weights since admission, possible weight gains per limited EMR history   Wt Readings from Last 5 Encounters:   02/23/25 114.9 kg (253 lb " 6.4 oz)   02/20/24 104.3 kg (230 lb)   09/22/23 99.3 kg (219 lb)   02/17/23 97.5 kg (215 lb)   01/18/22 96.2 kg (212 lb)     Skin/wounds: Reviewed   Wound Coccyx Pressure injury suspected hospital acquired;Moisture damage cover areas of open skin with absorptive dressing (silicone foam or foam dressing) (Active)   Wound Bed Other (Comment) 05/11/25 2016   Danielle-wound Assessment Erythema 05/11/25 0000   Drainage Amount None 05/11/25 0810   Wound Care/Cleansing Barrier applied  05/11/25 0000   Dressing Foam 05/11/25 1600   Dressing Status Clean, dry, intact 05/11/25 0810   Dressing Change Due 05/12/25 05/11/25 0810       Wound Right Back Abrasion(s) (Active)   Wound Bed Pink 05/11/25 0810   Drainage Amount None 05/11/25 0810   Dressing Foam 05/11/25 1600   Dressing Status Clean, dry, intact 05/11/25 0810   Dressing Change Due 05/11/25 05/10/25 1600     GI symptoms: no concerns; Last BM: 05/10/25  Nutrition-relevant labs: Reviewed  Lab Results   Component Value Date     05/11/2025    POTASSIUM 4.1 05/11/2025    GLC 91 05/11/2025    BUN 14.4 05/11/2025    CR 0.76 05/11/2025    GFRESTIMATED >90 05/11/2025    UDAY 9.0 05/11/2025    MAG 1.9 05/08/2025    PHOS 2.6 01/24/2025     Nutrition-relevant medications: Reviewed  Current Facility-Administered Medications   Medication Dose Route Frequency Provider Last Rate Last Admin    acetaminophen (TYLENOL) tablet 650 mg  650 mg Oral Q4H PRN Oleksandr Kong MD   650 mg at 04/28/25 0050    Or    acetaminophen (TYLENOL) Suppository 650 mg  650 mg Rectal Q4H PRN Oleksandr Kong MD   650 mg at 04/26/25 2314    [Held by provider] amLODIPine (NORVASC) tablet 10 mg  10 mg Oral Daily Troy Farley MD   10 mg at 05/04/25 0858    cinacalcet (SENSIPAR) tablet 90 mg  90 mg Oral Daily NIKOLAY Gambino MD   90 mg at 05/12/25 0809    cloNIDine (CATAPRES) tablet 0.1 mg  0.1 mg Oral BID Monse Lawson DO   0.1 mg at 05/12/25 0810    FLUoxetine (PROzac) capsule 20 mg  20 mg Oral  Daily Eduardo Reza PA-C   20 mg at 05/12/25 0810    gabapentin (NEURONTIN) capsule 300 mg  300 mg Oral At Bedtime Troy Short CNP   300 mg at 05/10/25 2120    hydrALAZINE (APRESOLINE) injection 10 mg  10 mg Intravenous Q6H PRN Troy Farley MD   10 mg at 04/24/25 1948    lidocaine (LMX4) cream   Topical Q1H PRN Oleksandr Kong MD        lidocaine 1 % 0.1-1 mL  0.1-1 mL Other Q1H PRN Oleksandr Kong MD        lisinopril (ZESTRIL) tablet 40 mg  40 mg Oral Daily Troy Farley MD   40 mg at 05/12/25 0808    melatonin tablet 3 mg  3 mg Oral At Bedtime Oleksandr Kong MD   3 mg at 05/10/25 2120    metoprolol succinate ER (TOPROL XL) 24 hr tablet 50 mg  50 mg Oral Daily Monse Lawson DO   50 mg at 05/12/25 0810    multivitamin w/minerals (THERA-VIT-M) tablet 1 tablet  1 tablet Oral Daily Monse Lawson DO   1 tablet at 05/12/25 0810    [Held by provider] OLANZapine (zyPREXA) injection 5 mg  5 mg Intramuscular Daily PRN Evelin Nava MD   5 mg at 04/18/25 1943    ondansetron (ZOFRAN ODT) ODT tab 4 mg  4 mg Oral Q6H PRN Oleksandr Kong MD        Or    ondansetron (ZOFRAN) injection 4 mg  4 mg Intravenous Q6H PRN Oleksandr Kong MD        polyethylene glycol (MIRALAX) Packet 17 g  17 g Oral BID PRN Oleksandr Kong MD   17 g at 04/10/25 0906    prochlorperazine (COMPAZINE) injection 5 mg  5 mg Intravenous Q6H PRN Oleksandr Kong MD        Or    prochlorperazine (COMPAZINE) tablet 5 mg  5 mg Oral Q6H PRN Oleksandr Kong MD        QUEtiapine (SEROquel) half-tab 12.5 mg  12.5 mg Oral QPM Jonatan Simental MD   12.5 mg at 05/11/25 2016    QUEtiapine (SEROquel) tablet 25 mg  25 mg Oral TID PRN Troy Short, CNP   25 mg at 05/09/25 2220    senna-docusate (SENOKOT-S/PERICOLACE) 8.6-50 MG per tablet 1 tablet  1 tablet Oral BID PRN Oleksandr Kong MD   1 tablet at 04/06/25 1601    Or    senna-docusate (SENOKOT-S/PERICOLACE) 8.6-50 MG per tablet 2 tablet  2 tablet Oral BID PRN Oleksandr Kong  MD        sennosides (SENOKOT) tablet 1 tablet  1 tablet Oral Daily Armando Kern MD   1 tablet at 05/12/25 0808    sodium chloride (PF) 0.9% PF flush 3 mL  3 mL Intracatheter Q8H Jonatan Simental MD   3 mL at 05/12/25 0810     MALNUTRITION  % Intake: No decreased intake noted  % Weight Loss: None noted  Subcutaneous Fat Loss: None observed  Muscle Loss: None observed  Fluid Accumulation/Edema: None noted  Malnutrition Diagnosis: Patient does not meet two of the established criteria necessary for diagnosing malnutrition  Malnutrition Present on Admission: No    EVALUATION OF PROGRESS TOWARD GOALS   Previous Goals  Wound healing per WOC documentation  PO - >50% meal trays and nutrition supplements TID  Evaluation: Not progressing    Previous Nutrition Diagnosis  Increased nutrient needs protein related to wound healing as evidenced by active DTPI, need for 1.2 g/kg/d PRO and oral nutrition supplements to help pt meet nutrition needs    Evaluation: No change    NUTRITION DIAGNOSIS  Increased nutrient needs protein related to wound healing as evidenced by active DTPI, need for 1.2 g/kg/d PRO and oral nutrition supplements to help pt meet nutrition needs    INTERVENTIONS  See nutrition interventions above    Goals  Patient to consume % of nutritionally adequate meal trays TID, or the equivalent with supplements/snacks.     Monitoring/Evaluation      Progress toward goals will be monitored and evaluated per policy.    Ronaldo Ly RD, LD, MS  [facility]  Available on [employer]

## 2025-05-12 NOTE — PROGRESS NOTES
OK per  to leave out IV access given no use for it, PIV removed d/t pt picking at it/increased agitation. Continue plan of Care.

## 2025-05-12 NOTE — PLAN OF CARE
PT: Observed patient ambulating in hallway with CNA. Noted no unsteadiness/LOB and no need for physical assist. Patient ambulated 100' back to room without an AD or physical assistance. Pt required SBA due to impaired safety awareness and cues for navigation of unit. No further IP PT needs indicated. Recommend patient return to [facility]. Will discharge pt from PT.    Physical Therapy Discharge Summary    Reason for therapy discharge:    All goals and outcomes met, no further needs identified.    Progress towards therapy goal(s). See goals on Care Plan in [facility] electronic health record for goal details.  Goals met    Therapy recommendation(s):    No further therapy is recommended.

## 2025-05-12 NOTE — PROGRESS NOTES
Care Management Follow Up    Length of Stay (days): 35    Expected Discharge Date: 05/13/2025     Concerns to be Addressed: discharge planning     Patient plan of care discussed at interdisciplinary rounds: Yes    Anticipated Discharge Disposition:                Anticipated Discharge Services:    Anticipated Discharge DME:      Patient/family educated on Medicare website which has current facility and service quality ratings:    Education Provided on the Discharge Plan:    Patient/Family in Agreement with the Plan: yes    Referrals Placed by CM/SW:    Private pay costs discussed: Not applicable    Discussed  Partnership in Safe Discharge Planning  document with patient/family: No     Handoff Completed: No, handoff not indicated or clinically appropriate    Additional Information:  Writer messaged provider if patient is medically ready to discharge back to his [facility]. Provider states he's been medically ready since 5-10. Writer will need to set up stretcher ride so provider asked writer to message tomorrow's provider for orders. My manager Kaitlynn LIM spoke with facility RN Kimber who told Kaitlynn that they can take him back even though earlier Kimber was asking PT to order a wheelchair because of the distance to patient's cafeteria. Not a need for wheelchair as patient is ambulating with independently yet SBA due to his mentation. PT has signed off. Writer set up stretcher ride through [employer] [facility] transport due to his impulsiveness for tomorrow with a time window of [id number]. Writer left messages for both guardians Maria C and Clifford but no call back as of yet. Continue POC.    Next Steps: discharge back to facility 5-13-25.      Oleksandr Whitney RN  [facility]  Inpatient Care Management - FLOAT  CM RN Mobile: [phone] daily 7:30-4:00

## 2025-05-13 VITALS
SYSTOLIC BLOOD PRESSURE: 152 MMHG | RESPIRATION RATE: 18 BRPM | OXYGEN SATURATION: 96 % | DIASTOLIC BLOOD PRESSURE: 79 MMHG | HEART RATE: 54 BPM | TEMPERATURE: 98.1 F

## 2025-05-13 PROBLEM — E21.3 HYPERPARATHYROIDISM: Status: ACTIVE | Noted: 2020-11-03

## 2025-05-13 PROBLEM — F29 PSYCHOSIS, UNSPECIFIED PSYCHOSIS TYPE (H): Status: ACTIVE | Noted: 2024-10-11

## 2025-05-13 PROBLEM — D56.9 THALASSEMIA, UNSPECIFIED TYPE: Status: ACTIVE | Noted: 2020-11-03

## 2025-05-13 PROCEDURE — 250N000013 HC RX MED GY IP 250 OP 250 PS 637: Performed by: INTERNAL MEDICINE

## 2025-05-13 PROCEDURE — 99239 HOSP IP/OBS DSCHRG MGMT >30: CPT | Performed by: INTERNAL MEDICINE

## 2025-05-13 PROCEDURE — 92526 ORAL FUNCTION THERAPY: CPT | Mod: GN

## 2025-05-13 PROCEDURE — 250N000013 HC RX MED GY IP 250 OP 250 PS 637: Performed by: PHYSICIAN ASSISTANT

## 2025-05-13 PROCEDURE — 250N000013 HC RX MED GY IP 250 OP 250 PS 637: Performed by: STUDENT IN AN ORGANIZED HEALTH CARE EDUCATION/TRAINING PROGRAM

## 2025-05-13 RX ORDER — AMLODIPINE BESYLATE 5 MG/1
5 TABLET ORAL DAILY
Status: DISCONTINUED | OUTPATIENT
Start: 2025-05-14 | End: 2025-05-13 | Stop reason: HOSPADM

## 2025-05-13 RX ORDER — CINACALCET 90 MG/1
90 TABLET, FILM COATED ORAL DAILY
DISCHARGE
Start: 2025-05-14

## 2025-05-13 RX ORDER — AMLODIPINE BESYLATE 5 MG/1
5 TABLET ORAL DAILY
DISCHARGE
Start: 2025-05-14

## 2025-05-13 RX ADMIN — Medication 1 TABLET: at 07:50

## 2025-05-13 RX ADMIN — CINACALCET 90 MG: 30 TABLET ORAL at 07:50

## 2025-05-13 RX ADMIN — FLUOXETINE HYDROCHLORIDE 20 MG: 20 CAPSULE ORAL at 07:50

## 2025-05-13 RX ADMIN — CLONIDINE HYDROCHLORIDE 0.1 MG: 0.1 TABLET ORAL at 07:50

## 2025-05-13 RX ADMIN — SENNOSIDES 1 TABLET: 8.6 TABLET ORAL at 07:50

## 2025-05-13 RX ADMIN — LISINOPRIL 40 MG: 40 TABLET ORAL at 07:50

## 2025-05-13 ASSESSMENT — ACTIVITIES OF DAILY LIVING (ADL)
ADLS_ACUITY_SCORE: 62
ADLS_ACUITY_SCORE: 63
ADLS_ACUITY_SCORE: 62
ADLS_ACUITY_SCORE: 63
ADLS_ACUITY_SCORE: 62
ADLS_ACUITY_SCORE: 63
ADLS_ACUITY_SCORE: 63

## 2025-05-13 NOTE — DISCHARGE SUMMARY
[facility]  Hospitalist Discharge Summary      Date of Admission:  4/4/2025  Date of Discharge:  5/13/2025  Discharging Provider: Aliyah Pearson MD  Discharge Service: Hospitalist Service    Discharge Diagnoses    Principal Problem:    Behavior problem, adult  Active Problems:    Benign essential hypertension    Hypercalcemia    Hyperparathyroidism    Thalassemia, unspecified type    Psychosis, unspecified psychosis type (H)    History of dementia    Major neurocognitive disorder (H)        Clinically Significant Risk Factors          Follow-ups Needed After Discharge   Follow-up Appointments       Hospital Follow-up with Existing Primary Care Provider (PCP)          Schedule Primary Care visit within: 14 Days   Recommended labs and Imaging (to be ordered by Primary Care Provider): recheck BMP:   Cinacalcet had been increased during hospitalization for hypercalcemia.  ensure calcium remains in normal range.   recheck BP, norvasc had been decreased during hospitaliaztion and   furosemide stopped.                Unresulted Labs Ordered in the Past 30 Days of this Admission       Date and Time Order Name Status Description    4/27/2025  5:36 PM Fungus Culture, non-blood Preliminary         These results will be followed up by PCP    Discharge Disposition   Discharged to long-term care facility  Condition at discharge: Stable    Hospital Course   Summary: Estevan Aaron is a 65 year old male with PMH  hypertension, hyperlipidemia, hyperparathyroidism and dementia who was admitted on 4/4/2025 with aggressive behaviors at his care facility.     Acute toxic metabolic encephalopathy, multifactorial  Hospital-acquired delirium  Chronic neuropsychiatric disorder with behavioral disturbance, unclear etiology  Alzheimer's dementia  Patient carries diagnosis of Alzheimer's dementia, although has significant history of underlying psychiatric disease and concern for paranoid personality disorder.  Patient's ex-wife/guardian describes a rather accelerated mental status decline beginning in late 2024. Had a prolonged inpatient psychiatric hospitalization 10/2024-2/2025.  At that time, suspected multifactorial encephalopathy dt delirium, psychoactive medication effects, and undifferentiated neuropsychiatric disorder.  Prior to admission, patient was ambulating independently at [facility]. Reportedly exhibited aggressive behaviors at [facility], prompting referral to ED for evaluation. In the ED, patient was calm and cooperative with staff, although has since had significant behavioral disturbances after admission. Has been seen by psych this stay, assisted with medication titration, however, patient developed severe encephalopathy with persistence somnolence.  Had a period of transient improvement in his mentation which was followed by subsequent decline with worsening somnolence noted on 4/24.  Etiology of persistent encephalopathy and worsening functional status unclear (began requiring Winnie lift, assist of 2; not following commands; had nonsensical speech). Considered occult infection (see fever below), consider rare neurological disorder (LP done 4/29), consider prolonged delirium, and consider progressive dementia.  Additional neurologic workup pursued.  MRI of brain this day was negative for acute changes. Mildly elevated ESR/CRP this stay. Thyroid studies generally unremarkable. EEG obtained 4/26 was consistent with encephalopathy, no seizures. LP done 4/29, was negative for CNS infection. MRI cervical spine negative on 4/29. Paraneoplastic panel neg. Alzheimer's evaluation ([id number]) done on CSF and was elevated -- consistent with Alzheimer's.  Psych attempted an Ativan challenge for treatment of possible catatonia, this resulted in increased somnolence making catatonia less likely culprit.  Clinical picture ultimately appeared suggestive of underlying Alzheimer's dementia and  superimposed delirium; unsure how much medication effect of Depakote and electrolyte disturbances may have also be contributing  Discussed with neuro on 5/4 -- held Depakote beginning 5/4 and monitor response. Mentation/overall condition continued to improve off Depakote -- cont holding      - Reconsult Psychiatry 5/8 for management of agitation  - Continued delirium precautions, maintain nl sleep/wake cycles  - Able to discharge back to memory care facility  with continued restorative cares, no plans for hospice care at this time  - Strarted Seroquel 12.5mg in evenings and noted good response    Concern for aspiration PNA vs pneumonitis: Treated  Intermittent fever w/ leukocytosis, unclear etiology: Improved  Acute hypoxic hypercarbic respiratory failure: Resolved  Sepsis w/ elevated lactate: Resolved  Respiratory acidosis w/ metabolic compensation: Resolved  RRT called 4/20 AM for somnolence, congested cough, hypoxia to 84%, and borderline fever. Briefly required BiPAP and quickly weaned to O2 NC. CXR without opacity, although some concern for acute aspiration event in setting of suspected medication-mediated somnolence as above.  Was able to quickly wean off O2. Intermittent fevers and leukocytosis of unclear etiology.  UA noninfectious, no ongoing respiratory symptoms, COVID/flu negative, CXR stable, pressure injury noted without concern for infection, no other skin changes, and MRI without acute change. LFTs mildly elevated but no apparent pain with deep abdominal palpation. No new medications. Overall unclear exact etiology of fevers and challenging assessment given underlying dementia. Completed 10 day course of empiric Zosyn 4/29 with resolution of fevers, persistent leukocytosis, no further treatment recommended by ID.  - WBC normalized, monitor periodically    Abnormal transaminases: Stable  AST/ALT intermittently elevated this stay. Total bili, alk phos nl. Holding statin. Monitor labs  periodically.    Hyperparathyroidism with hypercalcemia  Hypernatremia: Improved  Ongoing hypercalcemia noted this stay despite cinacalcet administration (held 4/23-4/26). Low suspicion that mild hypercalcemia alone is driving mental status changes. Repeat PTH slightly elevated to 73 this admission. Ionized calcium is elevated.  Nephrology consulted 4/30 for high calcium despite cinacalcet. Cinacalcet increased from 30 to 90 mg , given Zometa x1 on 5/1.   Given IVF bolus on 5/1-5/2 dt poor po intake. Lasix held, not restarted at discharge.   Given 500ml D5W on 5/3. Na and Ca slowly improved, unclear how much to correlate with slight improvement in mentation  - Continuing cinacalcet 90mg daily  (cinacalcet had been 30 mg prior to admission)  - Monitor BMP periodically as outpatient    Hypertension  Hyperlipidemia  PTA meds: amlodipine, clonidine, lisinopril, metoprolol, statin.   - BPs soft on 5/6 --   amlodipine reduced, furosemide stopped, other meds continued at time of discharged.     - statin on hold dt abnl LFTs    Microcytic anemia  - due to h/o unspecified thalassemia    Suspected ADAM   No formal sleep study, although high suspicion. Outpatient sleep center referral on discharge    Deep tissue pressure injury, sacrococcygeal area, hospital-acquired   No signs of infection. WOC RN followed during hospitalization    Consultations This Hospital Stay   DIAGNOSTIC EVALUATION CENTER (DEC) ASSESSMENT ORDER  PSYCHIATRY IP CONSULT  PSYCHIATRY IP CONSULT  CARE MANAGEMENT / SOCIAL WORK IP CONSULT  PSYCHIATRY IP CONSULT  PSYCHIATRY IP CONSULT  PSYCHIATRY IP CONSULT  SPEECH LANGUAGE PATH ADULT IP CONSULT  PSYCHIATRY IP CONSULT  CARE MANAGEMENT / SOCIAL WORK IP CONSULT  SPEECH LANGUAGE PATH ADULT IP CONSULT  PALLIATIVE CARE ADULT IP CONSULT  WOUND OSTOMY CONTINENCE NURSE  IP CONSULT  PSYCHIATRY IP CONSULT  NEUROLOGY IP CONSULT  PSYCHIATRY IP CONSULT  NEPHROLOGY IP CONSULT  PSYCHIATRY IP CONSULT  INFECTIOUS DISEASES IP  CONSULT  NEUROLOGY IP CONSULT  PHYSICAL THERAPY ADULT IP CONSULT  PSYCHIATRY IP CONSULT    Code Status   No CPR- Do NOT Intubate    Time Spent on this Encounter   I, Aliyah Pearson MD, personally saw the patient today and spent greater than 30 minutes discharging this patient.       Aliyah Pearson MD  [facility]  [address] [facility] [address]  Phone: [phone]  Fax: [phone]  ______________________________________________________________________    Physical Exam   Vital Signs: Temp: 98.1  F (36.7  C) Temp src: Oral BP: (!) 152/79 Pulse: 54   Resp: 18 SpO2: 96 % O2 Device: None (Room air)    Weight: 0 lbs 0 oz   Constitutional: Male in NAD  HEENT: Eyes nonicteric  Cardiovascular: RRR, normal S1/2, no m/r/g  Respiratory: CTAB, no wheezing or crackles  Vascular: No LE pitting edema  Neuro/Psych: A&O to self, moves all extremities       Primary Care Physician   [facility]    Discharge Orders      Primary Care - Care Coordination Referral      Home Care Referral      Reason for your hospital stay    You were hospitalized for dementia with behavioral disturbances.     Activity    Your activity upon discharge: activity as tolerated     Hospital Bed Order for DME - ONLY FOR DME     Winnie Lift Order for DME - ONLY FOR DME     Diet    Follow this diet upon discharge:         Snacks/Supplements Adult: Expedite Bottle; With Meals      Combination Diet Safe Tray - with utensils; Soft and Bite Sized Diet (level 6); Thin Liquids (level 0)     Hospital Follow-up with Existing Primary Care Provider (PCP)            Significant Results and Procedures   Most Recent 3 CBC's:  Recent Labs   Lab Test 05/11/25  1640 05/08/25  0047 05/06/25  0924   WBC 7.8 11.4* 10.7   HGB 10.8* 11.0* 11.5*   MCV 61* 61* 62*    386 421     Most Recent 3 BMP's:  Recent Labs   Lab Test 05/11/25  1640 05/09/25  0830 05/08/25  0831 05/08/25  0047 05/07/25  0826 05/06/25  0924      --   --  139  --  141   POTASSIUM 4.1 4.6 3.9 4.0   < > 3.6   CHLORIDE 101  --   --  104  --  105   CO2 22  --   --  23  --  22   BUN 14.4  --   --  26.4*  --  38.2*   CR 0.76  --   --  0.88  --  0.99   ANIONGAP 13  --   --  12  --  14   UDAY 9.0  --   --  9.5  --  9.4   GLC 91  --   --  100*  --  122*    < > = values in this interval not displayed.     Most Recent 2 LFT's:  Recent Labs   Lab Test 05/05/25  0842 04/28/25  0451   AST 53* 62*   ALT 66 75*   ALKPHOS 118 131   BILITOTAL 0.9 0.7     7-Day Micro Results       No results found for the last 168 hours.          Most Recent TSH and T4:  Recent Labs   Lab Test 04/26/25  0759 10/13/24  0805   TSH 2.37  --    T4  --  1.13     Most Recent Hemoglobin A1c:  Recent Labs   Lab Test 10/13/24  0805   A1C 5.9*     Most Recent 6 glucoses:  Recent Labs   Lab Test 05/11/25  1640 05/08/25  0047 05/06/25  0924 05/05/25  0842 05/04/25  1616 05/04/25  0616   GLC 91 100* 122* 136* 122* 111*     Most Recent ESR & CRP:  Recent Labs   Lab Test 05/03/25  1147 04/29/25  0910   SED 35*  --    CRPI  --  21.70*   ,   Results for orders placed or performed during the hospital encounter of 04/04/25   XR Chest Port 1 View    Narrative    EXAM: XR CHEST PORT 1 VIEW  LOCATION: [facility]  DATE: 4/20/2025    INDICATION: Fever, congested cough  COMPARISON: 11/12/2024      Impression    IMPRESSION: Rightward patient rotation. Cardiac silhouette upper range of normal in size. No evidence of focal airspace disease. No evidence of pneumothorax or pleural effusion.   XR Chest Port 1 View    Narrative    EXAM: XR CHEST PORT 1 VIEW  LOCATION: [facility]  DATE: 4/22/2025    INDICATION: Ongoing hypoxia.  COMPARISON: 4/20/2025.      Impression    IMPRESSION:   Cardiac enlargement increased in size since previous examination. Pulmonary vascularity within normal limits. Minimal atelectasis in both lung bases. Aortic calcification. Hypertrophic  changes thoracic spine.   MR Brain w/o Contrast    Narrative    EXAM: MR BRAIN W/O CONTRAST  LOCATION: [facility]  DATE: 4/24/2025    INDICATION: Ongoing somnolence, hx of dementia  COMPARISON: Head CT 10/12/2024  TECHNIQUE: Routine multiplanar multisequence head MRI without intravenous contrast.    FINDINGS:  Motion degraded exam.    INTRACRANIAL CONTENTS: No acute or subacute infarct. No mass, acute hemorrhage, or extra-axial fluid collections. Scattered nonspecific T2/FLAIR hyperintensities within the cerebral white matter most consistent with mild chronic microvascular ischemic   change. Mild to moderate generalized cerebral atrophy. No hydrocephalus. Normal position of the cerebellar tonsils.     SELLA: No abnormality accounting for technique.    OSSEOUS STRUCTURES/SOFT TISSUES: Normal marrow signal. The major intracranial vascular flow voids are maintained.     ORBITS: No abnormality accounting for technique.     SINUSES/MASTOIDS: No paranasal sinus mucosal disease. No middle ear or mastoid effusion.       Impression    IMPRESSION:  1.  Motion degraded exam.  2.  No definite acute findings.  3.  Age-related changes.     XR Chest Port 1 View    Narrative    EXAM: XR CHEST PORT 1 VIEW  LOCATION: [facility]  DATE: 4/25/2025    INDICATION: Fever.  COMPARISON: 4/22/2025.      Impression    IMPRESSION: Mild interstitial prominence at the left lung base, favoring scar versus atelectasis. No large focal airspace consolidation. Possible small left pleural effusion versus costophrenic scarring.    No pneumothorax.    Heart size is normal. Atherosclerotic calcifications of the thoracic aorta.   XR Lumbar Puncture Spinal Tap Diag    Narrative    EXAM:  1. DIAGNOSTIC LUMBAR PUNCTURE  2. FLUOROSCOPIC GUIDANCE  LOCATION: [facility]  DATE: 4/29/2025    INDICATION: Altered mental status with neuropsych symptoms.    RADIATION DOSE: Total Air Kerma 7  mGy    PROCEDURE: Procedure and risks explained to patient and consent received. The patient was placed in prone position, and the lower back was prepped and draped in sterile fashion. 1% local lidocaine infused in local soft tissues.     Under direct fluoroscopic guidance, a 20 gauge spinal needle was placed into the spinal canal at the L2-L3 level.    SPECIMEN: 18 mL of clear CSF sent to lab. 2.5 mL of fluid directly dipped into the Alvarez collection tube.    COMPLICATIONS: None.      Impression    IMPRESSION: Fluoroscopically guided lumbar puncture.   MR Cervical Spine w/o & w Contrast    Narrative    EXAM: MR CERVICAL SPINE W/O and W CONTRAST  LOCATION: [facility]  DATE: 4/30/2025    INDICATION: Hyperreflexia and decreased movement of extremities.  COMPARISON: None.  CONTRAST: 11 mL Gadavist.  TECHNIQUE: MRI Cervical Spine without and with IV contrast.    FINDINGS: Images are degraded by motion.  Cervical vertebra are grossly normal in height. Minimal alterations in the lateral alignment likely relate to degenerative changes. Bone marrow pattern is within normal limits. No abnormal cord signal. No extraspinal abnormality.    Craniovertebral junction and C1-C2: Normal.    C2-C3: Mild loss of disc height. No herniation. Moderate-to-advanced left hypertrophic facet arthropathy. No central canal stenosis. Mild-to-moderate left foraminal stenosis.     C3-C4: Normal disc height. Tiny central disc osteophyte complex. Mild right and moderate-to-advanced left facet arthropathy. No central canal stenosis. Mild-to-moderate left foraminal stenosis.     C4-C5: Minimal anterolisthesis. Mild loss of disc height. No herniation. Moderate right and mild left facet arthropathy. No central canal stenosis. There is moderate-to-severe right foraminal stenosis and mild left foraminal stenosis.     C5-C6: Mild-to-moderate loss of disc height. Right paracentral/foraminal broad-based disc osteophyte complex  results in some effacement of the ventral thecal sac and narrowing of the right lateral recess. Mild-to-moderate facet arthropathy. No central   canal stenosis. There is severe right and mild-to-moderate left foraminal stenosis.     C6-C7: Moderate loss of disc height. Minimal disc bulging or spurring. Mild-to-moderate facet arthropathy. No central canal stenosis. Moderate-to-severe right and mild-to-moderate left foraminal stenosis.     C7-T1: Normal disc height. No herniation. Normal facets. No spinal canal or neural foraminal stenosis.      Impression    IMPRESSION:  1.  No cord signal abnormality or abnormal cord enhancement.  2.  No central canal stenosis.  3.  Multilevel foraminal stenosis as above.   MR Brain w/o & w Contrast    Narrative    EXAM: MR BRAIN W/O and W CONTRAST  LOCATION: [facility]  DATE: 4/30/2025    INDICATION: AMS.  COMPARISON: 4/24/2025.  CONTRAST: 11 mL Gadavist.  TECHNIQUE: Routine multiplanar multisequence head MRI without and with intravenous contrast.    FINDINGS:  INTRACRANIAL CONTENTS: No acute or subacute infarct. No mass, acute hemorrhage, or extra-axial fluid collections. Scattered nonspecific T2/FLAIR hyperintensities within the cerebral white matter most consistent with mild chronic microvascular ischemic   change. Mild to moderate generalized cerebral atrophy. No hydrocephalus. Normal position of the cerebellar tonsils. No pathologic contrast enhancement.    SELLA: No abnormality accounting for technique.    OSSEOUS STRUCTURES/SOFT TISSUES: Normal marrow signal. The major intracranial vascular flow voids are maintained.     ORBITS: No abnormality accounting for technique.     SINUSES/MASTOIDS: No paranasal sinus mucosal disease. No middle ear or mastoid effusion.       Impression    IMPRESSION:  1.  No recent infarct, intracranial mass, abnormal enhancement or evidence of intracranial hemorrhage.  2.  Mild-to-moderate volume loss and mild presumed  chronic small vessel ischemic changes.   XR Chest Port 1 View    Narrative    EXAM: XR CHEST PORT 1 VIEW  LOCATION: [facility]  DATE: 2025    INDICATION: Chest pain.  COMPARISON: Portable chest single view 2025 at 1659 hours.      Impression    IMPRESSION: Mild elevation of left hemidiaphragm. Strands of platelike atelectasis left base. Right lung clear. No adenopathy or effusion. Cardiac size approaches upper limits of normal with normal pulmonary vascularity. Atherosclerotic thoracic aorta.   Degenerative changes both shoulders and the spine. Monitoring leads have been placed overlying the chest.    Echocardiogram Limited     Value    LVEF  65-70%    Narrative    [id number]  [id number]  [id number]  [id number]^BERTIN^STACEY^KOSTA     [facility]  Echocardiography Laboratory  [address] [place] [address]     Name: ARNOLD MITCHELL  MRN: [id number]  : 1960  Study Date: 2025 12:12 PM  Age: 65 yrs  Gender: Male  Patient Location: [facility]  Reason For Study: Pericardial Effusion  Ordering Physician: STACEY DENTON  Referring Physician: shelia  Performed By: Dougie Mccullough     BSA: 2.2 m2  Height: 66 in  Weight: 253 lb  HR: 86  BP: 135/71 mmHg  ______________________________________________________________________________  Procedure  Limited Echocardiogram with two-dimensional, color and spectral Doppler.  Definity (NDC #07150-088) given intravenously.  ______________________________________________________________________________  Interpretation Summary     Limited echocardiogram for evaluation of pericardial effusion.  No pericardial effusion.  Left ventricular systolic function is normal.  The left ventricular ejection fraction is 65-70%.  No regional wall motion abnormalities.  Normal right ventricular size and systolic function.  No significant valve disease.  Small inferior vena cava dimension (may represent hypovolemia, clinical  correlation  recommended).     There is no comparison study available.  ______________________________________________________________________________  Left Ventricle  The left ventricle is normal in size. Left ventricular systolic function is  normal. The visual ejection fraction is 65-70%. No regional wall motion  abnormalities noted.     Right Ventricle  The right ventricle is normal in size and function.     Mitral Valve  The mitral valve leaflets appear normal. There is no evidence of stenosis,  fluttering, or prolapse. There is trace mitral regurgitation. There is no  mitral valve stenosis.     Tricuspid Valve  The tricuspid valve is not well visualized. There is trace tricuspid  regurgitation.     Aortic Valve  The aortic valve is not well visualized. No aortic regurgitation is present.  No aortic stenosis is present.     Vessels  The aortic root is normal size. Normal size ascending aorta. Small inferior  vena cava size consistent with hypovolemia.     Pericardium  There is no pericardial effusion.     Rhythm  Sinus rhythm was noted.     ______________________________________________________________________________  MMode/2D Measurements & Calculations  IVSd: 0.94 cm  LVIDd: 5.7 cm  LVIDs: 3.4 cm  LVPWd: 1.3 cm  FS: 41.2 %  LV mass(C)d: 265.9 grams  LV mass(C)dI: 120.3 grams/m2  Ao root diam: 3.5 cm  asc Aorta Diam: 3.6 cm  LVOT diam: 2.5 cm  LVOT area: 4.8 cm2     Ao root diam index Ht(cm/m): 2.1  Ao root diam index BSA (cm/m2): 1.6  Asc Ao diam index BSA (cm/m2): 1.6  Asc Ao diam index Ht(cm/m): 2.1  RWT: 0.45     ______________________________________________________________________________  Report approved by: Dr Jennifer Bolanos on 04/22/2025 05:21 PM             Discharge Medications   Current Discharge Medication List        START taking these medications    Details   FLUoxetine (PROZAC) 20 MG capsule Take 1 capsule (20 mg) by mouth daily.  Qty: 30 capsule, Refills: 0    Associated Diagnoses: History of dementia       gabapentin (NEURONTIN) 300 MG capsule Take 1 capsule (300 mg) by mouth every evening.  Qty: 30 capsule, Refills: 0    Associated Diagnoses: History of dementia      multivitamin w/minerals (THERA-VIT-M) tablet Take 1 tablet by mouth daily.  Qty: 30 tablet, Refills: 0    Associated Diagnoses: History of dementia      !! QUEtiapine (SEROQUEL) 25 MG tablet Take 0.5 tablets (12.5 mg) by mouth every evening.  Qty: 30 tablet, Refills: 0    Associated Diagnoses: History of dementia      !! QUEtiapine (SEROQUEL) 25 MG tablet Take 1 tablet (25 mg) by mouth 3 times daily as needed (Agitation, impulsitivity, restlesness).  Qty: 30 tablet, Refills: 0    Associated Diagnoses: History of dementia       !! - Potential duplicate medications found. Please discuss with provider.        CONTINUE these medications which have CHANGED    Details   amLODIPine (NORVASC) 5 MG tablet Take 1 tablet (5 mg) by mouth daily.    Associated Diagnoses: Hypertension goal BP (blood pressure) < 130/80      cinacalcet (SENSIPAR) 90 MG tablet Take 1 tablet (90 mg) by mouth daily.    Associated Diagnoses: Hyperparathyroidism; Hypercalcemia           CONTINUE these medications which have NOT CHANGED    Details   atorvastatin (LIPITOR) 40 MG tablet Take 1 tablet (40 mg) by mouth daily.  Qty: 30 tablet, Refills: 0    Associated Diagnoses: Hyperlipidemia LDL goal <100      cloNIDine (CATAPRES) 0.1 MG tablet Take 1 tablet (0.1 mg) by mouth 2 times daily  Qty: 180 tablet, Refills: 4    Associated Diagnoses: Hypertension goal BP (blood pressure) < 130/80      lisinopril (ZESTRIL) 40 MG tablet Take 1 tablet (40 mg) by mouth daily.  Qty: 30 tablet, Refills: 0    Associated Diagnoses: Hypertension goal BP (blood pressure) < 130/80      melatonin 3 MG tablet Take 3 mg by mouth at bedtime.      metoprolol succinate ER (TOPROL XL) 50 MG 24 hr tablet Take 1 tablet (50 mg) by mouth daily.  Qty: 30 tablet, Refills: 0    Associated Diagnoses: Hypertension goal BP (blood  pressure) < 130/80      sennosides (SENOKOT) 8.6 MG tablet Take 1 tablet by mouth daily.           STOP taking these medications       furosemide (LASIX) 40 MG tablet Comments:   Reason for Stopping:         OLANZapine (ZYPREXA) 10 MG tablet Comments:   Reason for Stopping:         OLANZapine (ZYPREXA) 15 MG tablet Comments:   Reason for Stopping:             Allergies   No Known Allergies

## 2025-05-13 NOTE — PROGRESS NOTES
5/12 1900-0730       Diagnosis: Acute toxic metabolic encephalopathy, multifactorial  Hospital-acquired delirium  Chronic neuropsychiatric disorder with behavioral disturbance, unclear etiology  Alzheimer's dementia    Mental Status:Alert to self, baseline dementia  Activity/dangle: Independent In room  Diet: Soft/bite sized diet  Pain:denied pain  Alberto/Voiding:BR  02/LDA: RA. No iv access, MD aware  D/C Date: Back to [place] [facility], [facility] for [id number] today  Other Info: VSS

## 2025-05-13 NOTE — PROGRESS NOTES
Care Management Discharge Note    Discharge Date: 05/13/2025       Discharge Disposition:      Discharge Services:      Discharge DME:      Discharge Transportation:      Private pay costs discussed: Not applicable    Does the patient's insurance plan have a 3 day qualifying hospital stay waiver?  No    PAS Confirmation Code:    Patient/family educated on Medicare website which has current facility and service quality ratings:      Education Provided on the Discharge Plan:    Persons Notified of Discharge Plans: [facility], provider, C agency, RN  Patient/Family in Agreement with the Plan: yes    Handoff Referral Completed: No, handoff not indicated or clinically appropriate    Additional Information:  Iker mendez arrived around 1330 to transport patient back to his [facility] memory care facility today. Patient has accepting home care agency, [facility] for PT, OT, SN services so writer faxed home care discharge orders to [phone]. Writer faxed discharge Physician orders to facility to [phone], attention Vera the . Meds filled here and sent with patient. Writer updated both guardians via voice message. No staff from facility arrived to assess patient as promised.       Oleksandr Whitney, RN  [facility]  Inpatient Care Management - FLOAT  CM RN Mobile: [phone] daily 7:30-4:00

## 2025-05-13 NOTE — PROGRESS NOTES
VSS, Up independently. Confused, orient to person only. Discharge back to  via stretcher. Discharge medications send with patient.

## 2025-05-14 ENCOUNTER — PATIENT OUTREACH (OUTPATIENT)
Dept: CARE COORDINATION | Facility: CLINIC | Age: 65
End: 2025-05-14
Payer: MEDICARE

## 2025-05-14 NOTE — PROGRESS NOTES
Connected Care Resource Center: Connected Care Resource [place]    Background: Transitional Care Management program identified per system criteria and reviewed by Connected Care Resource Center team for possible outreach.    Assessment: Upon chart review, CCRC Team member will not proceed with patient outreach related to this episode of Transitional Care Management program due to reason below:    Patient has discharged to a Memory Care, Long-term Care, Assisted Living or Group Home where patient is receiving on-site support with their daily cares, including support with hospital follow up plan.    Plan: Transitional Care Management episode addressed appropriately per reason noted above.      [place] [facility]  Connected Care Resource [facility]    *Connected Care Resource Team does NOT follow patient ongoing. Referrals are identified based on internal discharge reports and the outreach is to ensure patient has an understanding of their discharge instructions.

## 2025-05-15 LAB — BACTERIA CSF CULT: NORMAL

## 2025-05-22 LAB — BACTERIA CSF CULT: NORMAL

## 2025-05-27 LAB — BACTERIA CSF CULT: NO GROWTH

## 2025-06-02 ENCOUNTER — LAB REQUISITION (OUTPATIENT)
Dept: LAB | Facility: CLINIC | Age: 65
End: 2025-06-02
Payer: MEDICARE

## 2025-06-02 DIAGNOSIS — F03.B11: ICD-10-CM

## 2025-06-02 DIAGNOSIS — F22 DELUSIONAL DISORDERS (H): ICD-10-CM

## 2025-06-02 DIAGNOSIS — F60.0 PARANOID PERSONALITY DISORDER (H): ICD-10-CM

## 2025-06-02 DIAGNOSIS — E21.3 HYPERPARATHYROIDISM, UNSPECIFIED: ICD-10-CM

## 2025-06-02 DIAGNOSIS — R45.1 RESTLESSNESS AND AGITATION: ICD-10-CM

## 2025-06-05 LAB
ALBUMIN SERPL BCG-MCNC: 4.1 G/DL (ref 3.5–5.2)
ALP SERPL-CCNC: 105 U/L (ref 40–150)
ALT SERPL W P-5'-P-CCNC: 20 U/L (ref 0–70)
ANION GAP SERPL CALCULATED.3IONS-SCNC: 11 MMOL/L (ref 7–15)
AST SERPL W P-5'-P-CCNC: 26 U/L (ref 0–45)
BILIRUB SERPL-MCNC: 0.9 MG/DL
BUN SERPL-MCNC: 12.1 MG/DL (ref 8–23)
CALCIUM SERPL-MCNC: 8.5 MG/DL (ref 8.8–10.4)
CHLORIDE SERPL-SCNC: 103 MMOL/L (ref 98–107)
CREAT SERPL-MCNC: 0.89 MG/DL (ref 0.67–1.17)
EGFRCR SERPLBLD CKD-EPI 2021: >90 ML/MIN/1.73M2
GLUCOSE SERPL-MCNC: 98 MG/DL (ref 70–99)
HCO3 SERPL-SCNC: 26 MMOL/L (ref 22–29)
PHOSPHATE SERPL-MCNC: 3.1 MG/DL (ref 2.5–4.5)
POTASSIUM SERPL-SCNC: 4 MMOL/L (ref 3.4–5.3)
PROT SERPL-MCNC: 6.7 G/DL (ref 6.4–8.3)
SODIUM SERPL-SCNC: 140 MMOL/L (ref 135–145)

## 2025-06-05 PROCEDURE — 36415 COLL VENOUS BLD VENIPUNCTURE: CPT | Mod: ORL

## 2025-06-05 PROCEDURE — 80053 COMPREHEN METABOLIC PANEL: CPT | Mod: ORL

## 2025-06-05 PROCEDURE — 84100 ASSAY OF PHOSPHORUS: CPT | Mod: ORL

## 2025-06-05 PROCEDURE — P9604 ONE-WAY ALLOW PRORATED TRIP: HCPCS | Mod: ORL

## 2025-06-15 ENCOUNTER — HOSPITAL ENCOUNTER (EMERGENCY)
Facility: CLINIC | Age: 65
Discharge: HOME | End: 2025-06-15
Payer: MEDICARE

## 2025-06-15 VITALS
WEIGHT: 220 LBS | DIASTOLIC BLOOD PRESSURE: 80 MMHG | HEART RATE: 67 BPM | HEIGHT: 67 IN | RESPIRATION RATE: 16 BRPM | OXYGEN SATURATION: 97 % | TEMPERATURE: 98 F | BODY MASS INDEX: 34.53 KG/M2 | SYSTOLIC BLOOD PRESSURE: 129 MMHG

## 2025-06-15 DIAGNOSIS — F02.818 ALZHEIMER'S DEMENTIA WITH BEHAVIORAL DISTURBANCE (H): ICD-10-CM

## 2025-06-15 DIAGNOSIS — G30.9 ALZHEIMER'S DEMENTIA WITH BEHAVIORAL DISTURBANCE (H): ICD-10-CM

## 2025-06-15 DIAGNOSIS — D64.9 ANEMIA: ICD-10-CM

## 2025-06-15 LAB
ALBUMIN UR-MCNC: NEGATIVE MG/DL
ANION GAP SERPL CALCULATED.3IONS-SCNC: 14 MMOL/L (ref 7–15)
APPEARANCE UR: CLEAR
BASOPHILS # BLD AUTO: 0.1 10E3/UL (ref 0–0.2)
BASOPHILS NFR BLD AUTO: 1 %
BILIRUB UR QL STRIP: NEGATIVE
BUN SERPL-MCNC: 15.7 MG/DL (ref 8–23)
CALCIUM SERPL-MCNC: 8.5 MG/DL (ref 8.8–10.4)
CHLORIDE SERPL-SCNC: 101 MMOL/L (ref 98–107)
COLOR UR AUTO: NORMAL
CREAT SERPL-MCNC: 0.96 MG/DL (ref 0.67–1.17)
DACRYOCYTES BLD QL SMEAR: SLIGHT
EGFRCR SERPLBLD CKD-EPI 2021: 87 ML/MIN/1.73M2
ELLIPTOCYTES BLD QL SMEAR: SLIGHT
EOSINOPHIL # BLD AUTO: 0.2 10E3/UL (ref 0–0.7)
EOSINOPHIL NFR BLD AUTO: 2 %
ERYTHROCYTE [DISTWIDTH] IN BLOOD BY AUTOMATED COUNT: 18.7 % (ref 10–15)
GLUCOSE SERPL-MCNC: 103 MG/DL (ref 70–99)
GLUCOSE UR STRIP-MCNC: NEGATIVE MG/DL
HCO3 SERPL-SCNC: 24 MMOL/L (ref 22–29)
HCT VFR BLD AUTO: 37.3 % (ref 40–53)
HGB BLD-MCNC: 11.4 G/DL (ref 13.3–17.7)
HGB UR QL STRIP: NEGATIVE
IMM GRANULOCYTES # BLD: 0.2 10E3/UL
IMM GRANULOCYTES NFR BLD: 2 %
KETONES UR STRIP-MCNC: NEGATIVE MG/DL
LEUKOCYTE ESTERASE UR QL STRIP: NEGATIVE
LYMPHOCYTES # BLD AUTO: 2.3 10E3/UL (ref 0.8–5.3)
LYMPHOCYTES NFR BLD AUTO: 27 %
MCH RBC QN AUTO: 19.3 PG (ref 26.5–33)
MCHC RBC AUTO-ENTMCNC: 30.6 G/DL (ref 31.5–36.5)
MCV RBC AUTO: 63 FL (ref 78–100)
MONOCYTES # BLD AUTO: 0.7 10E3/UL (ref 0–1.3)
MONOCYTES NFR BLD AUTO: 9 %
NEUTROPHILS # BLD AUTO: 5 10E3/UL (ref 1.6–8.3)
NEUTROPHILS NFR BLD AUTO: 58 %
NITRATE UR QL: NEGATIVE
NRBC # BLD AUTO: 0 10E3/UL
NRBC BLD AUTO-RTO: 0 /100
PH UR STRIP: 6.5 [PH] (ref 5–7)
PLAT MORPH BLD: ABNORMAL
PLATELET # BLD AUTO: 326 10E3/UL (ref 150–450)
POLYCHROMASIA BLD QL SMEAR: SLIGHT
POTASSIUM SERPL-SCNC: 4.1 MMOL/L (ref 3.4–5.3)
RBC # BLD AUTO: 5.91 10E6/UL (ref 4.4–5.9)
RBC MORPH BLD: ABNORMAL
RBC URINE: 0 /HPF
SODIUM SERPL-SCNC: 139 MMOL/L (ref 135–145)
SP GR UR STRIP: 1.01 (ref 1–1.03)
UROBILINOGEN UR STRIP-MCNC: NORMAL MG/DL
WBC # BLD AUTO: 8.5 10E3/UL (ref 4–11)
WBC URINE: <1 /HPF

## 2025-06-15 PROCEDURE — 80048 BASIC METABOLIC PNL TOTAL CA: CPT

## 2025-06-15 PROCEDURE — 81001 URINALYSIS AUTO W/SCOPE: CPT

## 2025-06-15 PROCEDURE — 36415 COLL VENOUS BLD VENIPUNCTURE: CPT

## 2025-06-15 PROCEDURE — 85025 COMPLETE CBC W/AUTO DIFF WBC: CPT

## 2025-06-15 PROCEDURE — 99283 EMERGENCY DEPT VISIT LOW MDM: CPT

## 2025-06-15 RX ORDER — QUETIAPINE FUMARATE 25 MG/1
25 TABLET, FILM COATED ORAL 3 TIMES DAILY PRN
Status: DISCONTINUED | OUTPATIENT
Start: 2025-06-15 | End: 2025-06-15 | Stop reason: HOSPADM

## 2025-06-15 ASSESSMENT — ACTIVITIES OF DAILY LIVING (ADL)
ADLS_ACUITY_SCORE: 57

## 2025-06-15 ASSESSMENT — COLUMBIA-SUICIDE SEVERITY RATING SCALE - C-SSRS
2. HAVE YOU ACTUALLY HAD ANY THOUGHTS OF KILLING YOURSELF IN THE PAST MONTH?: NO
6. HAVE YOU EVER DONE ANYTHING, STARTED TO DO ANYTHING, OR PREPARED TO DO ANYTHING TO END YOUR LIFE?: NO
1. IN THE PAST MONTH, HAVE YOU WISHED YOU WERE DEAD OR WISHED YOU COULD GO TO SLEEP AND NOT WAKE UP?: NO

## 2025-06-15 NOTE — ED PROVIDER NOTES
"Emergency Department Note      History of Present Illness   Chief Complaint  Altered Mental Status    HPI  Estevan Aaron is a 65 year old male with a history of dementia, psychosis presenting today for evaluation of agitation.  When asked why he is here, patient states \"I chased a body and they called the police\".  He denies any pain, fevers, headaches, dizziness.  Patient denies thoughts of harming self or harming others.     EMS states that nursing home called the police on the patient as he was attempting to lower a female resident at his assisted living facility into his room, as he believes she was his mother.  Staff members intervened and the patient became aggressive with them.     Independent Historian  I spoke with the patient's daughter and ex-wife who states that this behavior is not unusual for the patient.  They state the patient has a Ortega order.  He lives in an assisted living facility in memory care facility, he is relatively independent but does have cares that include reminding him to take his medications, brushes teeth, use the bathroom and take showers.  They state hallucinations and behaviors like this are not unusual.  They agree to having the patient stay for evaluation.    I spoke with staff at patient's facility.  They state no prn agitation medications were given to patient when he became agitated.  The person who witnessed the event did not document the event and was not available to provide witness.     Review of External Notes  Discharge summary from 2/20/2025  Reviewed discharge summary from 5/13/2025.  Patient was admitted for Alzheimer's dementia.  While here, he had dementia medications adjusted.    Past Medical History   Medical History and Problem List  Past Medical History:   Diagnosis Date    Kidney stone     Serum calcium elevated     Tobacco use disorder        Medications  amLODIPine (NORVASC) 5 MG tablet  atorvastatin (LIPITOR) 40 MG tablet  cinacalcet (SENSIPAR) 90 MG " "tablet  cloNIDine (CATAPRES) 0.1 MG tablet  FLUoxetine (PROZAC) 20 MG capsule  gabapentin (NEURONTIN) 300 MG capsule  lisinopril (ZESTRIL) 40 MG tablet  melatonin 3 MG tablet  metoprolol succinate ER (TOPROL XL) 50 MG 24 hr tablet  multivitamin w/minerals (THERA-VIT-M) tablet  QUEtiapine (SEROQUEL) 25 MG tablet  QUEtiapine (SEROQUEL) 25 MG tablet  sennosides (SENOKOT) 8.6 MG tablet        Surgical History   Past Surgical History:   Procedure Laterality Date    ANESTHESIA OUT OF OR MRI N/A 10/28/2024    Procedure: 1.5 MRI Brain;  Surgeon: GENERIC ANESTHESIA PROVIDER;  Location: [facility] OR    ROTATOR CUFF REPAIR RT/LT Right 2021         Physical Exam   Patient Vitals for the past 24 hrs:   BP Temp Temp src Pulse Resp SpO2 Height Weight   06/15/25 2023 129/80 -- -- -- -- -- -- --   06/15/25 1430 110/70 98  F (36.7  C) Oral 67 16 97 % 1.702 m (5' 7\") 99.8 kg (220 lb)     Physical Exam  /80   Pulse 67   Temp 98  F (36.7  C) (Oral)   Resp 16   Ht 1.702 m (5' 7\")   Wt 99.8 kg (220 lb)   SpO2 97%   BMI 34.46 kg/m     General: No acute distress.  Head: Atraumatic.   EENT: Moist mucus membranes.   CV: Regular rate and rhythm.   Respiratory: Breathing comfortably on room air. Lungs clear to auscultation bilaterally without wheezes, rhonchi, or rales.  GI: Soft, non-distended. Non-tender abdomen. No rebound, rigidity, or guarding.   Msk: Extremities without tenderness to palpation or deformity.  Skin: Warm and dry. No rashes.  Neuro: Awake, alert, and conversant. Not answering questions appropriately.  Psych: Calm and cooperative.  Oriented to self and month.  Not oriented to year.  Knows that he is in a hospital.  Denies suicidal or homicidal ideation.    Diagnostics   Lab Results   Labs Ordered and Resulted from Time of ED Arrival to Time of ED Departure   BASIC METABOLIC PANEL - Abnormal       Result Value    Sodium 139      Potassium 4.1      Chloride 101      Carbon Dioxide (CO2) 24      Anion Gap 14      Urea " Nitrogen 15.7      Creatinine 0.96      GFR Estimate 87      Calcium 8.5 (*)     Glucose 103 (*)    CBC WITH PLATELETS AND DIFFERENTIAL - Abnormal    WBC Count 8.5      RBC Count 5.91 (*)     Hemoglobin 11.4 (*)     Hematocrit 37.3 (*)     MCV 63 (*)     MCH 19.3 (*)     MCHC 30.6 (*)     RDW 18.7 (*)     Platelet Count 326      % Neutrophils 58      % Lymphocytes 27      % Monocytes 9      % Eosinophils 2      % Basophils 1      % Immature Granulocytes 2      NRBCs per 100 WBC 0      Absolute Neutrophils 5.0      Absolute Lymphocytes 2.3      Absolute Monocytes 0.7      Absolute Eosinophils 0.2      Absolute Basophils 0.1      Absolute Immature Granulocytes 0.2      Absolute NRBCs 0.0     RBC AND PLATELET MORPHOLOGY - Abnormal    RBC Morphology Confirmed RBC Indices      Platelet Assessment        Value: Automated Count Confirmed. Platelet morphology is normal.    Elliptocytes Slight (*)     Polychromasia Slight (*)     Teardrop Cells Slight (*)    ROUTINE UA WITH MICROSCOPIC REFLEX TO CULTURE - Normal    Color Urine Light Yellow      Appearance Urine Clear      Glucose Urine Negative      Bilirubin Urine Negative      Ketones Urine Negative      Specific Gravity Urine 1.011      Blood Urine Negative      pH Urine 6.5      Protein Albumin Urine Negative      Urobilinogen Urine Normal      Nitrite Urine Negative      Leukocyte Esterase Urine Negative      RBC Urine 0      WBC Urine <1         Imaging  No orders to display     Independent Interpretation  None  ED Course    Medications Administered  Medications - No data to display    Procedures  Procedures     Discussion of Management  Social work consulted in the emergency deparmtnet    Social Determinants of Health adding to complexity of care  Stress/Adjustment Disorders  and Social Connections/Isolation     ED Course  ED Course as of 06/16/25 0018   Sun Jovani 15, 2025   9401 I spoke with both of the patient's coguardian's, Maria C and Sharon, who states that the  patient's behavior today is baseline for him, he has a Ortega order for taking his medications.  They fear that he is not getting his as needed medications for agitation where he is currently living and expressed frustration that staff will not give him his as needed agitation medicine and instead, call police to have him brought to the ED for evaluation for agitation due to dementia.   1711 Attempted to call patient's [facility] and reached voice mail.   1933 Spoke with nursing manager of [facility].  She did not answer the phone after 4 phone calls.  Nurse manager, who is on-call expresses that it is her day off.  I explained to the nurse manager that the patient is calm and cooperative here.  I have spoken with the patient's family who states that he is at baseline.  I have spoken with numerous staff members at the facility who state that they have not had a problem with Santos while he has been a resident there and that today seem to be an isolated event.  Nurse supervisor of assisted living seemed frustrated, not demonstrating an understanding the role of emergency department in chronic behavioral problems for Alzheimer's dementia patients who reside in memory care/assisted living facilities.  Had long conversation with this nurse supervisor regarding this patient's chronic health problems.  Supervisor accepted patient for return and abruptly ended the phone call.     Medical Decision Making / Diagnosis   CMS Diagnoses: None    MIPS     None    MDM  Estevan Aaron is a 65 year old male presenting today from assisted living/memory care facility for evaluation of agitation and confusion.  Story is somewhat unclear and I have received numerous different stories from different sources, ultimately, upon arrival, patient completely calm without any interventions or medications.  I spoke with the patient's family who state that cognitively and behaviorally, he is at his baseline and behaviors today are not drastically different  "than usual behaviors.  Did proceed with a medical workup today to evaluate for any possible alternative causes of abnormal behaviors such as metabolic abnormalities or infection and ultimately, workup today is normal.  Baseline anemia noted.  He remained calm and cooperative for the 6-hour duration in the emergency department.    I called staff at his assisted living facility and they stated \"we have no problem with Santos, he is welcome to come back, but you must clear this with the nursing manager\".    He is not expressing suicidal or homicidal ideation here.  Nothing to indicate that he is an immediate threat to himself or others, there is a care plan in place with prn medications for agitation at the [facility].  Because of his Alzheimer's, he would not be a candidate for inpatient psychiatry placement.     Initially was some back-and-forth between ED staff and nursing supervisor in regards to Santos's return to [facility], please see Kwabena Silva BSW note for further details.     Patient was accepted back to his [facility] facility.  This appears to be an isolated event which was confirmed by staff at [facility], the nursing supervisor (Kimber), and his family who are also his guardians.  He does have as needed medications that can be given whenever he becomes agitated, unfortunately medications were not given to him today when he was agitated.  At this time, I do not think it is appropriate to admit the patient for further medication adjustments as he does have as needed medications when he becomes agitated and, per staff at the assisted living facility, they do work, they just were not administered today due to the level of agitation, and once again, he was able to de-escalate here without need for medication.    Plan will be for discharge back to [facility].  I did discuss with nurse supervisor that if they feel like he is not a good fit, they can certainly go through " the correct channels to make those changes.  Recommended having a care conference with patient, family and staff.  This plan was communicated with guardians.  Everyone was in agreement the plan, he was formally excepted back to memory care and the patient was discharged home, transferred in [facility].    Disposition  The patient was discharged.     ICD-10 Codes:    ICD-10-CM    1. Alzheimer's dementia with behavioral disturbance (H)  G30.9     F02.818       2. Anemia  D64.9              Oriana Cuello PA-C  Niyah 15, 2025   [employer]         Portions of the record may have been created with voice recognition software. Occasional wrong-word or 'sound-a-like' substitutions may have occurred due to the inherent limitations of voice recognition software.       Oriana Cuello PA-C  06/15/25 2227       Oriana Cuello PA-C  06/16/25 0018

## 2025-06-15 NOTE — ED NOTES
Attempted to call nurse at facility. Attempted both numbers on chart and left voicemails at both. Pt provided courtesy meal and juice.

## 2025-06-15 NOTE — ED NOTES
Talked to nursing supervisor of [facility]. She states that they will not accept pt back because of his behaviors. RN notified provider who will involve social work.

## 2025-06-15 NOTE — ED TRIAGE NOTES
"Pt comes in from [facility]. Pt has dementia at baseline. Pt today thought that someone in the [facility] was his mother and was attempting to \"lure\" them into his room. This was not hostile or sexual. Pt got aggressive with staff members when they intervened. Staff called PD and wanted the patient transferred here for evaluation until tomorrow, Pt is now calm and cooperative and at his baseline, per sister.      Triage Assessment (Adult)       Row Name 06/15/25 3385          Triage Assessment    Airway WDL WDL        Respiratory WDL    Respiratory WDL WDL        Skin Circulation/Temperature WDL    Skin Circulation/Temperature WDL WDL        Cardiac WDL    Cardiac WDL WDL        Peripheral/Neurovascular WDL    Peripheral Neurovascular WDL WDL        Cognitive/Neuro/Behavioral WDL    Cognitive/Neuro/Behavioral WDL --  Dementia at baseline                     "

## 2025-06-15 NOTE — PROGRESS NOTES
Care Management Follow Up    Length of Stay (days): 0    Expected Discharge Date: 06/15/25     Concerns to be Addressed:       Patient plan of care discussed at interdisciplinary rounds: No    Anticipated Discharge Disposition:                Anticipated Discharge Services:    Anticipated Discharge DME:      Patient/family educated on Medicare website which has current facility and service quality ratings:    Education Provided on the Discharge Plan:    Patient/Family in Agreement with the Plan:      Referrals Placed by CM/SW:    Private pay costs discussed: Not applicable    Discussed  Partnership in Safe Discharge Planning  document with patient/family: No     Handoff Completed: No, handoff not indicated or clinically appropriate    Additional Information:   notified by physician and bedside nurse that patient presented from [facility] for evaluation of behaviors. Per notes, patient has dementia at baseline. Behaviors to be evaluated reportedly include trying to lure another female facility resident into his room, and then becoming aggressive with staff when they intervened. Writer spoke with physician who provided paperwork that arrived along with patient, including Emergency Evaluation document from [place] [employer]. Physician stated that when she spoke with patient's daughter and ex-spouse, who are co-legal guardians, this behavior is patient at baseline due to dementia and mental health diagnoses. Physician provided two phone numbers for nursing supervisor, Kimber ([phone] & [phone]), at [facility] to discuss ability to return to memory care. Physician and bedside nurse stated Kimber initially stated patient unable to return due to behaviors.     Writer called and left voicemail for Kimber at [phone] requesting callback as patient is resident of their facility. Writer attempted to call other phone number [phone] and did not successfully  reach her. Writer notified  supervisors to situation as there is no evidence of eviction notice from facility which would prevent patient's return.     Addendum 20:00     spoke with physician again and relayed that he was unable to reach nursing supervisor at [facility]. Physician tried to call her again and was unsuccessful. Writer called again and was able to reach Kimber nursing supervisor. Writer stated there is no medical reason for patient to stay in hospital for further evaluation. Kimber stated she disagreed and that she has not been at facility today as it is her day off. Kimber stated patient needs supervision and because she is not there, would prefer if patient stayed overnight. Physician joined in with writer on the call via speaker-phone. Physician spoke with Kimber explaining that patient did not receive PRN medications at facility which likely lead to agitated behavior. Kimber disagreed with both writer and physician that patient should return to facility. Physician stated patient has been calm during hospital stay and that patient needs to return to facility. Kimber tentatively agreed for patient to return, stating that if behavior occurs again it will be hospital's responsibility, then terminated call.    Writer called [facility] and spoke with floor nurse as well as patient's aide. Writer inquired what would be needed for successful return to facility. Facility nurse stated a return by 22:00 would be needed. Facility nurse stated they will administer PRNs once patient returns to facility. Writer spoke with bedside nurse and confirmed that stretcher ride would be appropriate given reason for ED visit.    Writer called [facility] and scheduled stretcher ride to bring patient from [facility] ED back to [facility]. Estimated arrival time to hospital is within a half hour. Writer submitted PCS online form via Epic.    Writer called patient's co-guardian Maria C to  communicate this information and discharge plan; she consented. Writer called patient's other co-guardian Clifford and left voicemail requesting callback. Writer notified bedside nurse, charge nurse, and physician to discharge plan. All parties are in agreement.    [facility] to discuss behavior of nursing supervisor at [facility].        Next Steps: [facility] stretcher ride back to [facility] within the half hour    ZOFIA Kumari  [facility]  Inpatient Care Management

## 2025-06-15 NOTE — ED NOTES
Bed: ED22  Expected date:   Expected time:   Means of arrival:   Comments:  Hems [phone] M from nursing home- eval for behavior

## 2025-06-16 NOTE — DISCHARGE INSTRUCTIONS
Continue with medications for dementia as prescribed    Recommend doing a care conference to consider any future care as needed at assisted living facility    Return for headaches, vision changes, fevers    There is no medical concerns today for behaviors.  This is likely manifestation of Alzheimer's.  If there are concerns for safety, need to take care of this internally.

## (undated) RX ORDER — PROPOFOL 10 MG/ML
INJECTION, EMULSION INTRAVENOUS
Status: DISPENSED
Start: 2024-10-28

## (undated) RX ORDER — MIDAZOLAM HYDROCHLORIDE 2 MG/ML
SYRUP ORAL
Status: DISPENSED
Start: 2024-10-28

## (undated) RX ORDER — EPHEDRINE SULFATE 50 MG/ML
INJECTION, SOLUTION INTRAMUSCULAR; INTRAVENOUS; SUBCUTANEOUS
Status: DISPENSED
Start: 2024-10-28

## (undated) RX ORDER — LIDOCAINE HYDROCHLORIDE 10 MG/ML
INJECTION, SOLUTION EPIDURAL; INFILTRATION; INTRACAUDAL; PERINEURAL
Status: DISPENSED
Start: 2025-04-29

## (undated) RX ORDER — ONDANSETRON 2 MG/ML
INJECTION INTRAMUSCULAR; INTRAVENOUS
Status: DISPENSED
Start: 2024-10-28

## (undated) RX ORDER — DEXAMETHASONE SODIUM PHOSPHATE 4 MG/ML
INJECTION, SOLUTION INTRA-ARTICULAR; INTRALESIONAL; INTRAMUSCULAR; INTRAVENOUS; SOFT TISSUE
Status: DISPENSED
Start: 2024-10-28